# Patient Record
Sex: FEMALE | Race: WHITE | NOT HISPANIC OR LATINO | Employment: OTHER | URBAN - METROPOLITAN AREA
[De-identification: names, ages, dates, MRNs, and addresses within clinical notes are randomized per-mention and may not be internally consistent; named-entity substitution may affect disease eponyms.]

---

## 2017-01-02 ENCOUNTER — CARE COORDINATION (OUTPATIENT)
Dept: CARE COORDINATION | Facility: CLINIC | Age: 47
End: 2017-01-02

## 2017-01-02 NOTE — PROGRESS NOTES
[facility] Care Coordination Outreach    Patient was enrolled in [facility] upon discharge from hospital. Patient is enrolled in COPD Program.     Clinical Data:  Patient disenrolled due to inactivity.

## 2017-02-03 ENCOUNTER — TRANSFERRED RECORDS (OUTPATIENT)
Dept: HEALTH INFORMATION MANAGEMENT | Facility: CLINIC | Age: 47
End: 2017-02-03

## 2017-02-23 ENCOUNTER — TRANSFERRED RECORDS (OUTPATIENT)
Dept: HEALTH INFORMATION MANAGEMENT | Facility: CLINIC | Age: 47
End: 2017-02-23

## 2017-04-03 ENCOUNTER — TRANSFERRED RECORDS (OUTPATIENT)
Dept: HEALTH INFORMATION MANAGEMENT | Facility: CLINIC | Age: 47
End: 2017-04-03
Payer: MEDICARE

## 2017-04-17 ENCOUNTER — TRANSFERRED RECORDS (OUTPATIENT)
Dept: HEALTH INFORMATION MANAGEMENT | Facility: CLINIC | Age: 47
End: 2017-04-17
Payer: MEDICARE

## 2017-05-15 ENCOUNTER — TRANSFERRED RECORDS (OUTPATIENT)
Dept: HEALTH INFORMATION MANAGEMENT | Facility: CLINIC | Age: 47
End: 2017-05-15
Payer: MEDICARE

## 2017-06-05 ENCOUNTER — TRANSFERRED RECORDS (OUTPATIENT)
Dept: HEALTH INFORMATION MANAGEMENT | Facility: CLINIC | Age: 47
End: 2017-06-05

## 2017-10-03 ENCOUNTER — TRANSFERRED RECORDS (OUTPATIENT)
Dept: HEALTH INFORMATION MANAGEMENT | Facility: CLINIC | Age: 47
End: 2017-10-03
Payer: MEDICARE

## 2017-12-04 ENCOUNTER — TRANSFERRED RECORDS (OUTPATIENT)
Dept: HEALTH INFORMATION MANAGEMENT | Facility: CLINIC | Age: 47
End: 2017-12-04
Payer: MEDICARE

## 2018-02-06 ENCOUNTER — TRANSFERRED RECORDS (OUTPATIENT)
Dept: HEALTH INFORMATION MANAGEMENT | Facility: CLINIC | Age: 48
End: 2018-02-06

## 2018-02-28 ENCOUNTER — TRANSFERRED RECORDS (OUTPATIENT)
Dept: HEALTH INFORMATION MANAGEMENT | Facility: CLINIC | Age: 48
End: 2018-02-28

## 2018-04-04 ENCOUNTER — APPOINTMENT (OUTPATIENT)
Dept: CT IMAGING | Facility: CLINIC | Age: 48
DRG: 920 | End: 2018-04-04
Attending: INTERNAL MEDICINE
Payer: COMMERCIAL

## 2018-04-04 ENCOUNTER — HOSPITAL ENCOUNTER (INPATIENT)
Facility: CLINIC | Age: 48
LOS: 3 days | Discharge: HOME | DRG: 920 | End: 2018-04-08
Attending: INTERNAL MEDICINE | Admitting: INTERNAL MEDICINE
Payer: COMMERCIAL

## 2018-04-04 DIAGNOSIS — I10 ESSENTIAL HYPERTENSION: Primary | ICD-10-CM

## 2018-04-04 DIAGNOSIS — K59.03 DRUG-INDUCED CONSTIPATION: ICD-10-CM

## 2018-04-04 DIAGNOSIS — T81.82XA SUBCUTANEOUS EMPHYSEMA AFTER PROCEDURE: ICD-10-CM

## 2018-04-04 DIAGNOSIS — J44.1 COPD EXACERBATION (H): ICD-10-CM

## 2018-04-04 DIAGNOSIS — L03.211 FACIAL CELLULITIS: ICD-10-CM

## 2018-04-04 LAB
ALBUMIN SERPL-MCNC: 3.8 G/DL (ref 3.4–5)
ALP SERPL-CCNC: 100 U/L (ref 40–150)
ALT SERPL W P-5'-P-CCNC: 10 U/L (ref 0–50)
ANION GAP SERPL CALCULATED.3IONS-SCNC: 7 MMOL/L (ref 3–14)
AST SERPL W P-5'-P-CCNC: 13 U/L (ref 0–45)
BASOPHILS # BLD AUTO: 0 10E9/L (ref 0–0.2)
BASOPHILS NFR BLD AUTO: 0 %
BILIRUB SERPL-MCNC: 0.4 MG/DL (ref 0.2–1.3)
BUN SERPL-MCNC: 13 MG/DL (ref 7–30)
CALCIUM SERPL-MCNC: 8.9 MG/DL (ref 8.5–10.1)
CHLORIDE SERPL-SCNC: 102 MMOL/L (ref 94–109)
CO2 BLDCOV-SCNC: 27 MMOL/L (ref 21–28)
CO2 SERPL-SCNC: 29 MMOL/L (ref 20–32)
CREAT SERPL-MCNC: 0.73 MG/DL (ref 0.52–1.04)
CRP SERPL-MCNC: 22.6 MG/L (ref 0–8)
DIFFERENTIAL METHOD BLD: ABNORMAL
EOSINOPHIL # BLD AUTO: 0 10E9/L (ref 0–0.7)
EOSINOPHIL NFR BLD AUTO: 0 %
ERYTHROCYTE [DISTWIDTH] IN BLOOD BY AUTOMATED COUNT: 14.6 % (ref 10–15)
ERYTHROCYTE [SEDIMENTATION RATE] IN BLOOD BY WESTERGREN METHOD: 9 MM/H (ref 0–20)
GFR SERPL CREATININE-BSD FRML MDRD: 86 ML/MIN/1.7M2
GLUCOSE SERPL-MCNC: 123 MG/DL (ref 70–99)
HCT VFR BLD AUTO: 44.4 % (ref 35–47)
HGB BLD-MCNC: 14.9 G/DL (ref 11.7–15.7)
IMM GRANULOCYTES # BLD: 0.1 10E9/L (ref 0–0.4)
IMM GRANULOCYTES NFR BLD: 0.2 %
LACTATE BLD-SCNC: 2.6 MMOL/L (ref 0.7–2.1)
LYMPHOCYTES # BLD AUTO: 0.8 10E9/L (ref 0.8–5.3)
LYMPHOCYTES NFR BLD AUTO: 3.6 %
MCH RBC QN AUTO: 30.1 PG (ref 26.5–33)
MCHC RBC AUTO-ENTMCNC: 33.6 G/DL (ref 31.5–36.5)
MCV RBC AUTO: 90 FL (ref 78–100)
MONOCYTES # BLD AUTO: 0.3 10E9/L (ref 0–1.3)
MONOCYTES NFR BLD AUTO: 1.6 %
NEUTROPHILS # BLD AUTO: 19.5 10E9/L (ref 1.6–8.3)
NEUTROPHILS NFR BLD AUTO: 94.6 %
NRBC # BLD AUTO: 0 10*3/UL
NRBC BLD AUTO-RTO: 0 /100
PCO2 BLDV: 44 MM HG (ref 40–50)
PH BLDV: 7.4 PH (ref 7.32–7.43)
PLATELET # BLD AUTO: 279 10E9/L (ref 150–450)
PO2 BLDV: 40 MM HG (ref 25–47)
POTASSIUM SERPL-SCNC: 3.6 MMOL/L (ref 3.4–5.3)
PROT SERPL-MCNC: 7.5 G/DL (ref 6.8–8.8)
RBC # BLD AUTO: 4.95 10E12/L (ref 3.8–5.2)
SAO2 % BLDV FROM PO2: 75 %
SODIUM SERPL-SCNC: 138 MMOL/L (ref 133–144)
WBC # BLD AUTO: 20.6 10E9/L (ref 4–11)

## 2018-04-04 PROCEDURE — 25000128 H RX IP 250 OP 636: Performed by: INTERNAL MEDICINE

## 2018-04-04 PROCEDURE — 70491 CT SOFT TISSUE NECK W/DYE: CPT

## 2018-04-04 PROCEDURE — 86140 C-REACTIVE PROTEIN: CPT | Performed by: INTERNAL MEDICINE

## 2018-04-04 PROCEDURE — 70487 CT MAXILLOFACIAL W/DYE: CPT

## 2018-04-04 PROCEDURE — 82803 BLOOD GASES ANY COMBINATION: CPT

## 2018-04-04 PROCEDURE — 80053 COMPREHEN METABOLIC PANEL: CPT | Performed by: INTERNAL MEDICINE

## 2018-04-04 PROCEDURE — 99285 EMERGENCY DEPT VISIT HI MDM: CPT | Mod: 25 | Performed by: INTERNAL MEDICINE

## 2018-04-04 PROCEDURE — 25000125 ZZHC RX 250: Performed by: INTERNAL MEDICINE

## 2018-04-04 PROCEDURE — 99285 EMERGENCY DEPT VISIT HI MDM: CPT | Mod: Z6 | Performed by: INTERNAL MEDICINE

## 2018-04-04 PROCEDURE — 83605 ASSAY OF LACTIC ACID: CPT

## 2018-04-04 PROCEDURE — 85025 COMPLETE CBC W/AUTO DIFF WBC: CPT | Performed by: INTERNAL MEDICINE

## 2018-04-04 PROCEDURE — 96366 THER/PROPH/DIAG IV INF ADDON: CPT | Performed by: INTERNAL MEDICINE

## 2018-04-04 PROCEDURE — 96365 THER/PROPH/DIAG IV INF INIT: CPT | Performed by: INTERNAL MEDICINE

## 2018-04-04 PROCEDURE — 96375 TX/PRO/DX INJ NEW DRUG ADDON: CPT | Performed by: INTERNAL MEDICINE

## 2018-04-04 PROCEDURE — 85652 RBC SED RATE AUTOMATED: CPT | Performed by: INTERNAL MEDICINE

## 2018-04-04 RX ORDER — IOPAMIDOL 755 MG/ML
100 INJECTION, SOLUTION INTRAVASCULAR ONCE
Status: COMPLETED | OUTPATIENT
Start: 2018-04-04 | End: 2018-04-04

## 2018-04-04 RX ORDER — HYDROMORPHONE HYDROCHLORIDE 1 MG/ML
0.5 INJECTION, SOLUTION INTRAMUSCULAR; INTRAVENOUS; SUBCUTANEOUS ONCE
Status: COMPLETED | OUTPATIENT
Start: 2018-04-04 | End: 2018-04-04

## 2018-04-04 RX ORDER — NAPROXEN 500 MG/1
500 TABLET ORAL 2 TIMES DAILY PRN
Status: ON HOLD | COMMUNITY
End: 2019-08-22

## 2018-04-04 RX ORDER — AMPICILLIN AND SULBACTAM 2; 1 G/1; G/1
3 INJECTION, POWDER, FOR SOLUTION INTRAMUSCULAR; INTRAVENOUS ONCE
Status: COMPLETED | OUTPATIENT
Start: 2018-04-04 | End: 2018-04-05

## 2018-04-04 RX ADMIN — SODIUM CHLORIDE 50 ML: 9 INJECTION, SOLUTION INTRAVENOUS at 22:34

## 2018-04-04 RX ADMIN — IOPAMIDOL 100 ML: 755 INJECTION, SOLUTION INTRAVENOUS at 22:33

## 2018-04-04 RX ADMIN — AMPICILLIN SODIUM AND SULBACTAM SODIUM 3 G: 2; 1 INJECTION, POWDER, FOR SOLUTION INTRAMUSCULAR; INTRAVENOUS at 23:00

## 2018-04-04 RX ADMIN — HYDROMORPHONE HYDROCHLORIDE 0.5 MG: 1 INJECTION, SOLUTION INTRAMUSCULAR; INTRAVENOUS; SUBCUTANEOUS at 22:33

## 2018-04-04 ASSESSMENT — ENCOUNTER SYMPTOMS
FACIAL SWELLING: 1
SHORTNESS OF BREATH: 0

## 2018-04-04 NOTE — IP AVS SNAPSHOT
MRN:[id number]                      After Visit Summary   4/4/2018    Zayra Ramirez    MRN: [id number]           Thank you!     Thank you for choosing [facility] for your care. Our goal is always to provide you with excellent care. Hearing back from our patients is one way we can continue to improve our services. Please take a few minutes to complete the written survey that you may receive in the mail after you visit with us. Thank you!        Patient Information     Date Of Birth          1970        Designated Caregiver       Most Recent Value    Caregiver    Will someone help with your care after discharge? yes    Name of designated caregiver spouseTim    Phone number of caregiver [phone]    Caregiver address same address as pt's      About your hospital stay     You were admitted on:  April 5, 2018 You last received care in the:  [facility]    You were discharged on:  April 8, 2018        Reason for your hospital stay       Face cellulitis and emphysema                  Who to Call     For medical emergencies, please call 911.  For non-urgent questions about your medical care, please call your primary care provider or clinic, [phone]          Attending Provider     Provider Specialty    Abeba Ackerman MD Internal Medicine    [facility]    [facility]       Primary Care Provider Office Phone # Fax #    Preeti Marrufo PA-C [phone]      After Care Instructions     Activity       Your activity upon discharge: activity as tolerated            Diet       Follow this diet upon discharge: 2g salt                  Follow-up Appointments     [facility]ult [facility] Follow-up and recommended labs and tests       Follow up with primary care provider, Preeti Marrufo, within 7 days for hospital follow- up.  No follow up labs or test are needed.  Follow with ID in one month for face cellulitis       Appointments on [place]  "and/or [facility] (with [facility] or [facility] provider or service). Call [phone] if you haven't heard regarding these appointments within 7 days of discharge.                  Pending Results     No orders found from 2018 to 2018.            Statement of Approval     Ordered          18 1303  I have reviewed and agree with all the recommendations and orders detailed in this document.  EFFECTIVE NOW     Approved and electronically signed by:  Kobi Fisher MD             Admission Information     Date & Time Provider Department Dept. Phone    2018 Kobi Fisher MD [facility] [phone]      Your Vitals Were     Blood Pressure Pulse Temperature Respirations Weight Last Period    158/76 80 97.4  F (36.3  C) (Oral) 16 111.6 kg (246 lb) (LMP Unknown)    Pulse Oximetry BMI (Body Mass Index)                93% 42.23 kg/m2          MyChart Information     [employer] lets you send messages to your doctor, view your test results, renew your prescriptions, schedule appointments and more. To sign up, go to [email or web address] . Click on \"Log in\" on the left side of the screen, which will take you to the Welcome page. Then click on \"Sign up Now\" on the right side of the page.     You will be asked to enter the access code listed below, as well as some personal information. Please follow the directions to create your username and password.     Your access code is: [email or web address]  Expires: 2018  1:19 PM     Your access code will  in 90 days. If you need help or a new code, please call your [facility] clinic or [phone].        Care EveryWhere ID     This is your Care EveryWhere ID. This could be used by other organizations to access your [facility] medical records  [id number]        Equal Access to Services     BELTRAN MIRELES : Birgit House, waalicjada alberto, qaybta kaalmanicholas bridges. So [facility] [phone].    ATENCIÓN: Si breanne mahmood " diaz disposición servicios gratuitos de asistencia lingüística. Patria john [phone].    We comply with applicable federal civil rights laws and [place] laws. We do not discriminate on the basis of race, color, national origin, age, disability, sex, sexual orientation, or gender identity.               Review of your medicines      START taking        Dose / Directions    amLODIPine 10 MG tablet   Commonly known as:  NORVASC   Used for:  Essential hypertension        Dose:  10 mg   Start taking on:  4/9/2018   Take 1 tablet (10 mg) by mouth daily   Quantity:  30 tablet   Refills:  0       amoxicillin-clavulanate 875-125 MG per tablet   Commonly known as:  AUGMENTIN   Indication:  Infection of the Skin and/or Related Soft Tissue   Used for:  Subcutaneous emphysema after procedure, Facial cellulitis        Dose:  1 tablet   Take 1 tablet by mouth 2 times daily   Quantity:  10 tablet   Refills:  0       lisinopril-hydrochlorothiazide 20-25 MG per tablet   Commonly known as:  PRINZIDE/ZESTORETIC   Used for:  Essential hypertension   Replaces:  lisinopril-hydrochlorothiazide 20-12.5 MG per tablet        Dose:  1 tablet   Start taking on:  4/9/2018   Take 1 tablet by mouth daily   Quantity:  30 tablet   Refills:  0       senna-docusate 8.6-50 MG per tablet   Commonly known as:  SENOKOT-S;PERICOLACE   Used for:  Drug-induced constipation        Dose:  1-2 tablet   Take 1-2 tablets by mouth 2 times daily   Quantity:  100 tablet   Refills:  0         CONTINUE these medicines which may have CHANGED, or have new prescriptions. If we are uncertain of the size of tablets/capsules you have at home, strength may be listed as something that might have changed.        Dose / Directions    oxyCODONE IR 5 MG tablet   Commonly known as:  ROXICODONE   This may have changed:  when to take this   Used for:  Facial cellulitis        Dose:  5 mg   Take 1 tablet (5 mg) by mouth every 6 hours as needed for moderate to severe pain   Quantity:   20 tablet   Refills:  0         CONTINUE these medicines which have NOT CHANGED        Dose / Directions    ABILIFY PO        Dose:  10 mg   Take 10 mg by mouth daily.   Refills:  0       albuterol 108 (90 BASE) MCG/ACT Inhaler   Commonly known as:  PROAIR HFA/PROVENTIL HFA/VENTOLIN HFA        Dose:  2 puff   Inhale 2 puffs into the lungs every 6 hours as needed for shortness of breath / dyspnea or wheezing   Refills:  0       DULOXETINE HCL PO        Dose:  120 mg   Take 120 mg by mouth daily   Refills:  0       fluticasone-salmeterol 500-50 MCG/DOSE diskus inhaler   Commonly known as:  ADVAIR        Dose:  1 puff   Inhale 1 puff into the lungs every 12 hours   Refills:  0       LAMOTRIGINE PO        Dose:  300 mg   Take 300 mg by mouth daily   Refills:  0       NAPROXEN PO        Dose:  500 mg   Take 500 mg by mouth 2 times daily as needed for moderate pain   Refills:  0       ROPINIROLE HCL PO        Dose:  2 mg   Take 2 mg by mouth nightly as needed   Refills:  0       SINGULAIR 10 MG tablet   Generic drug:  montelukast        Dose:  10 mg   Take 10 mg by mouth At Bedtime   Refills:  0         STOP taking     ipratropium - albuterol 0.5 mg/2.5 mg/3 mL 0.5-2.5 (3) MG/3ML neb solution   Commonly known as:  DUONEB           lisinopril-hydrochlorothiazide 20-12.5 MG per tablet   Commonly known as:  PRINZIDE/ZESTORETIC   Replaced by:  lisinopril-hydrochlorothiazide 20-25 MG per tablet           METHYLPREDNISOLONE PO                Where to get your medicines      These medications were sent to [facility] [place], [place] - [address]  [address] [address]     Phone:  [phone]     amLODIPine 10 MG tablet    amoxicillin-clavulanate 875-125 MG per tablet    lisinopril-hydrochlorothiazide 20-25 MG per tablet    senna-docusate 8.6-50 MG per tablet         Some of these will need a paper prescription and others can be bought over the counter. Ask your nurse if you have  questions.     Bring a paper prescription for each of these medications     oxyCODONE IR 5 MG tablet                Protect others around you: Learn how to safely use, store and throw away your medicines at www.disposemymeds.org.        ANTIBIOTIC INSTRUCTION     You've Been Prescribed an Antibiotic - Now What?  Your healthcare team thinks that you or your loved one might have an infection. Some infections can be treated with antibiotics, which are powerful, life-saving drugs. Like all medications, antibiotics have side effects and should only be used when necessary. There are some important things you should know about your antibiotic treatment.      Your healthcare team may run tests before you start taking an antibiotic.    Your team may take samples (e.g., from your blood, urine or other areas) to run tests to look for bacteria. These test can be important to determine if you need an antibiotic at all and, if you do, which antibiotic will work best.      Within a few days, your healthcare team might change or even stop your antibiotic.    Your team may start you on an antibiotic while they are working to find out what is making you sick.    Your team might change your antibiotic because test results show that a different antibiotic would be better to treat your infection.    In some cases, once your team has more information, they learn that you do not need an antibiotic at all. They may find out that you don't have an infection, or that the antibiotic you're taking won't work against your infection. For example, an infection caused by a virus can't be treated with antibiotics. Staying on an antibiotic when you don't need it is more likely to be harmful than helpful.      You may experience side effects from your antibiotic.    Like all medications, antibiotics have side effects. Some of these can be serious.    Let you healthcare team know if you have any known allergies when you are admitted to the  hospital.    One significant side effect of nearly all antibiotics is the risk of severe and sometimes deadly diarrhea caused by Clostridium difficile (C. Difficile). This occurs when a person takes antibiotics because some good germs are destroyed. Antibiotic use allows C. diificile to take over, putting patients at high risk for this serious infection.    As a patient or caregiver, it is important to understand your or your loved one's antibiotic treatment. It is especially important for caregivers to speak up when patients can't speak for themselves. Here are some important questions to ask your healthcare team.    What infection is this antibiotic treating and how do you know I have that infection?    What side effects might occur from this antibiotic?    How long will I need to take this antibiotic?    Is it safe to take this antibiotic with other medications or supplements (e.g., vitamins) that I am taking?     Are there any special directions I need to know about taking this antibiotic? For example, should I take it with food?    How will I be monitored to know whether my infection is responding to the antibiotic?    What tests may help to make sure the right antibiotic is prescribed for me?      Information provided by:  www.cdc.gov/getsmart  U.S. Department of Health and Human Services  Centers for disease Control and Prevention  National Center for Emerging and Zoonotic Infectious Diseases  Division of Healthcare Quality Promotion        Information about OPIOIDS     PRESCRIPTION OPIOIDS: WHAT YOU NEED TO KNOW    Prescription opioids can be used to help relieve moderate to severe pain and are often prescribed following a surgery or injury, or for certain health conditions. These medications can be an important part of treatment but also come with serious risks. It is important to work with your health care provider to make sure you are getting the safest, most effective care.    WHAT ARE THE RISKS AND SIDE  EFFECTS OF OPIOID USE?  Prescription opioids carry serious risks of addiction and overdose, especially with prolonged use. An opioid overdose, often marked by slowed breathing can cause sudden death. The use of prescription opioids can have a number of side effects as well, even when taken as directed:      Tolerance - meaning you might need to take more of a medication for the same pain relief    Physical dependence - meaning you have symptoms of withdrawal when a medication is stopped    Increased sensitivity to pain    Constipation    Nausea, vomiting, and dry mouth    Sleepiness and dizziness    Confusion    Depression    Low levels of testosterone that can result in lower sex drive, energy, and strength    Itching and sweating    RISKS ARE GREATER WITH:    History of drug misuse, substance use disorder, or overdose    Mental health conditions (such as depression or anxiety)    Sleep apnea    Older age (65 years or older)    Pregnancy    Avoid alcohol while taking prescription opioids.   Also, unless specifically advised by your health care provider, medications to avoid include:    Benzodiazepines (such as Xanax or Valium)    Muscle relaxants (such as Soma or Flexeril)    Hypnotics (such as Ambien or Lunesta)    Other prescription opioids    KNOW YOUR OPTIONS:  Talk to your health care provider about ways to manage your pain that do not involve prescription opioids. Some of these options may actually work better and have fewer risks and side effects:    Pain relievers such as acetaminophen, ibuprofen, and naproxen    Some medications that are also used for depression or seizures    Physical therapy and exercise    Cognitive behavioral therapy, a psychological, goal-directed approach, in which patients learn how to modify physical, behavioral, and emotional triggers of pain and stress    IF YOU ARE PRESCRIBED OPIOIDS FOR PAIN:    Never take opioids in greater amounts or more often than prescribed    Follow up  with your primary health care provider and work together to create a plan on how to manage your pain.    Talk about ways to help manage your pain that do not involve prescription opioids    Talk about all concerns and side effects    Help prevent misuse and abuse    Never sell or share prescription opioids    Never use another person's prescription opioids    Store prescription opioids in a secure place and out of reach of others (this may include visitors, children, friends, and family)    Visit www.cdc.gov/drugoverdose to learn about risks of opioid abuse and overdose    If you believe you may be struggling with addiction, tell your health care provider and ask for guidance or call [facility]'s National Helpline at 4-448-317-HELP    LEARN MORE / www.cdc.gov/drugoverdose/prescribing/guideline.html    Safely dispose of unused prescription opioids: Find your local drug take-back programs and more information about the importance of safe disposal at www.doseofreality.[place].gov             Medication List: This is a list of all your medications and when to take them. Check marks below indicate your daily home schedule. Keep this list as a reference.      Medications           Morning Afternoon Evening Bedtime As Needed    ABILIFY PO   Take 10 mg by mouth daily.   Last time this was given:  10 mg on 4/8/2018  8:12 AM                                albuterol 108 (90 BASE) MCG/ACT Inhaler   Commonly known as:  PROAIR HFA/PROVENTIL HFA/VENTOLIN HFA   Inhale 2 puffs into the lungs every 6 hours as needed for shortness of breath / dyspnea or wheezing                                amLODIPine 10 MG tablet   Commonly known as:  NORVASC   Take 1 tablet (10 mg) by mouth daily   Start taking on:  4/9/2018   Last time this was given:  5 mg on 4/8/2018  8:11 AM                                amoxicillin-clavulanate 875-125 MG per tablet   Commonly known as:  AUGMENTIN   Take 1 tablet by mouth 2 times daily   Last time this was given:  1  tablet on 4/8/2018  8:11 AM                                DULOXETINE HCL PO   Take 120 mg by mouth daily   Last time this was given:  60 mg on 4/8/2018  8:11 AM                                fluticasone-salmeterol 500-50 MCG/DOSE diskus inhaler   Commonly known as:  ADVAIR   Inhale 1 puff into the lungs every 12 hours   Last time this was given:  1 puff on 4/8/2018  8:17 AM                                LAMOTRIGINE PO   Take 300 mg by mouth daily   Last time this was given:  150 mg on 4/8/2018  8:11 AM                                lisinopril-hydrochlorothiazide 20-25 MG per tablet   Commonly known as:  PRINZIDE/ZESTORETIC   Take 1 tablet by mouth daily   Start taking on:  4/9/2018   Last time this was given:  1 tablet on 4/8/2018  8:12 AM                                NAPROXEN PO   Take 500 mg by mouth 2 times daily as needed for moderate pain   Last time this was given:  500 mg on 4/8/2018 10:42 AM                                oxyCODONE IR 5 MG tablet   Commonly known as:  ROXICODONE   Take 1 tablet (5 mg) by mouth every 6 hours as needed for moderate to severe pain   Last time this was given:  10 mg on 4/8/2018 10:42 AM                                ROPINIROLE HCL PO   Take 2 mg by mouth nightly as needed   Last time this was given:  2 mg on 4/7/2018 10:46 PM                                senna-docusate 8.6-50 MG per tablet   Commonly known as:  SENOKOT-S;PERICOLACE   Take 1-2 tablets by mouth 2 times daily   Last time this was given:  2 tablets on 4/8/2018  8:12 AM                                SINGULAIR 10 MG tablet   Take 10 mg by mouth At Bedtime   Last time this was given:  10 mg on 4/7/2018 10:26 PM   Generic drug:  montelukast

## 2018-04-04 NOTE — IP AVS SNAPSHOT
[facility]    [address]    [facility] [address]    Phone:  [phone]                                       After Visit Summary   4/4/2018    Zayra Ramirez    MRN: [id number]           After Visit Summary Signature Page     I have received my discharge instructions, and my questions have been answered. I have discussed any challenges I see with this plan with the nurse or doctor.    ..........................................................................................................................................  Patient/Patient Representative Signature      ..........................................................................................................................................  Patient Representative Print Name and Relationship to Patient    ..................................................               ................................................  Date                                            Time    ..........................................................................................................................................  Reviewed by Signature/Title    ...................................................              ..............................................  Date                                                            Time

## 2018-04-05 PROBLEM — T79.7XXA SUBCUTANEOUS EMPHYSEMA (H): Status: ACTIVE | Noted: 2018-04-05

## 2018-04-05 LAB
BASOPHILS # BLD AUTO: 0 10E9/L (ref 0–0.2)
BASOPHILS NFR BLD AUTO: 0.1 %
CRP SERPL-MCNC: 16.2 MG/L (ref 0–8)
DIFFERENTIAL METHOD BLD: ABNORMAL
EOSINOPHIL # BLD AUTO: 0 10E9/L (ref 0–0.7)
EOSINOPHIL NFR BLD AUTO: 0 %
ERYTHROCYTE [DISTWIDTH] IN BLOOD BY AUTOMATED COUNT: 14.6 % (ref 10–15)
HCT VFR BLD AUTO: 43.8 % (ref 35–47)
HGB BLD-MCNC: 14.1 G/DL (ref 11.7–15.7)
IMM GRANULOCYTES # BLD: 0.1 10E9/L (ref 0–0.4)
IMM GRANULOCYTES NFR BLD: 0.3 %
LYMPHOCYTES # BLD AUTO: 1.5 10E9/L (ref 0.8–5.3)
LYMPHOCYTES NFR BLD AUTO: 8.2 %
MCH RBC QN AUTO: 28.9 PG (ref 26.5–33)
MCHC RBC AUTO-ENTMCNC: 32.2 G/DL (ref 31.5–36.5)
MCV RBC AUTO: 90 FL (ref 78–100)
MONOCYTES # BLD AUTO: 0.7 10E9/L (ref 0–1.3)
MONOCYTES NFR BLD AUTO: 3.7 %
NEUTROPHILS # BLD AUTO: 15.6 10E9/L (ref 1.6–8.3)
NEUTROPHILS NFR BLD AUTO: 87.7 %
NRBC # BLD AUTO: 0 10*3/UL
NRBC BLD AUTO-RTO: 0 /100
PLATELET # BLD AUTO: 294 10E9/L (ref 150–450)
RBC # BLD AUTO: 4.88 10E12/L (ref 3.8–5.2)
WBC # BLD AUTO: 17.8 10E9/L (ref 4–11)

## 2018-04-05 PROCEDURE — 25000128 H RX IP 250 OP 636: Performed by: INTERNAL MEDICINE

## 2018-04-05 PROCEDURE — 12000001 ZZH R&B MED SURG/OB UMMC

## 2018-04-05 PROCEDURE — 99207 ZZC CDG-CHARGE REQUIRED MANUAL ENTRY: CPT | Performed by: INTERNAL MEDICINE

## 2018-04-05 PROCEDURE — 36415 COLL VENOUS BLD VENIPUNCTURE: CPT | Performed by: INTERNAL MEDICINE

## 2018-04-05 PROCEDURE — 85025 COMPLETE CBC W/AUTO DIFF WBC: CPT | Performed by: INTERNAL MEDICINE

## 2018-04-05 PROCEDURE — 99207 ZZC APP CREDIT; MD BILLING SHARED VISIT: CPT | Performed by: INTERNAL MEDICINE

## 2018-04-05 PROCEDURE — 96376 TX/PRO/DX INJ SAME DRUG ADON: CPT | Performed by: INTERNAL MEDICINE

## 2018-04-05 PROCEDURE — 25000132 ZZH RX MED GY IP 250 OP 250 PS 637: Performed by: INTERNAL MEDICINE

## 2018-04-05 PROCEDURE — 99223 1ST HOSP IP/OBS HIGH 75: CPT | Mod: AI | Performed by: INTERNAL MEDICINE

## 2018-04-05 PROCEDURE — 86140 C-REACTIVE PROTEIN: CPT | Performed by: INTERNAL MEDICINE

## 2018-04-05 RX ORDER — ONDANSETRON 2 MG/ML
4 INJECTION INTRAMUSCULAR; INTRAVENOUS EVERY 6 HOURS PRN
Status: DISCONTINUED | OUTPATIENT
Start: 2018-04-05 | End: 2018-04-08 | Stop reason: HOSPADM

## 2018-04-05 RX ORDER — LAMOTRIGINE 150 MG/1
300 TABLET ORAL DAILY
Status: DISCONTINUED | OUTPATIENT
Start: 2018-04-05 | End: 2018-04-05

## 2018-04-05 RX ORDER — NAPROXEN 500 MG/1
500 TABLET ORAL 2 TIMES DAILY PRN
Status: DISCONTINUED | OUTPATIENT
Start: 2018-04-05 | End: 2018-04-08 | Stop reason: HOSPADM

## 2018-04-05 RX ORDER — OXYCODONE HYDROCHLORIDE 5 MG/1
5-10 TABLET ORAL 3 TIMES DAILY PRN
Status: DISCONTINUED | OUTPATIENT
Start: 2018-04-05 | End: 2018-04-07

## 2018-04-05 RX ORDER — LISINOPRIL AND HYDROCHLOROTHIAZIDE 12.5; 2 MG/1; MG/1
1 TABLET ORAL DAILY
Status: DISCONTINUED | OUTPATIENT
Start: 2018-04-05 | End: 2018-04-06

## 2018-04-05 RX ORDER — HYDRALAZINE HYDROCHLORIDE 20 MG/ML
10 INJECTION INTRAMUSCULAR; INTRAVENOUS EVERY 6 HOURS PRN
Status: DISCONTINUED | OUTPATIENT
Start: 2018-04-05 | End: 2018-04-08 | Stop reason: HOSPADM

## 2018-04-05 RX ORDER — CALCIUM CARBONATE 500 MG/1
1000 TABLET, CHEWABLE ORAL 4 TIMES DAILY PRN
Status: DISCONTINUED | OUTPATIENT
Start: 2018-04-05 | End: 2018-04-08 | Stop reason: HOSPADM

## 2018-04-05 RX ORDER — NALOXONE HYDROCHLORIDE 0.4 MG/ML
.1-.4 INJECTION, SOLUTION INTRAMUSCULAR; INTRAVENOUS; SUBCUTANEOUS
Status: DISCONTINUED | OUTPATIENT
Start: 2018-04-05 | End: 2018-04-08 | Stop reason: HOSPADM

## 2018-04-05 RX ORDER — MONTELUKAST SODIUM 10 MG/1
10 TABLET ORAL AT BEDTIME
Status: DISCONTINUED | OUTPATIENT
Start: 2018-04-05 | End: 2018-04-08 | Stop reason: HOSPADM

## 2018-04-05 RX ORDER — AMPICILLIN AND SULBACTAM 2; 1 G/1; G/1
3 INJECTION, POWDER, FOR SOLUTION INTRAMUSCULAR; INTRAVENOUS EVERY 6 HOURS
Status: DISCONTINUED | OUTPATIENT
Start: 2018-04-05 | End: 2018-04-07

## 2018-04-05 RX ORDER — ARIPIPRAZOLE 10 MG/1
10 TABLET ORAL DAILY
Status: DISCONTINUED | OUTPATIENT
Start: 2018-04-05 | End: 2018-04-08 | Stop reason: HOSPADM

## 2018-04-05 RX ORDER — LAMOTRIGINE 150 MG/1
150 TABLET ORAL 2 TIMES DAILY
Status: DISCONTINUED | OUTPATIENT
Start: 2018-04-05 | End: 2018-04-08 | Stop reason: HOSPADM

## 2018-04-05 RX ORDER — NALOXONE HYDROCHLORIDE 0.4 MG/ML
.1-.4 INJECTION, SOLUTION INTRAMUSCULAR; INTRAVENOUS; SUBCUTANEOUS
Status: DISCONTINUED | OUTPATIENT
Start: 2018-04-05 | End: 2018-04-05

## 2018-04-05 RX ORDER — ONDANSETRON 4 MG/1
4 TABLET, ORALLY DISINTEGRATING ORAL EVERY 6 HOURS PRN
Status: DISCONTINUED | OUTPATIENT
Start: 2018-04-05 | End: 2018-04-08 | Stop reason: HOSPADM

## 2018-04-05 RX ORDER — ALBUTEROL SULFATE 90 UG/1
2 AEROSOL, METERED RESPIRATORY (INHALATION) EVERY 6 HOURS PRN
Status: DISCONTINUED | OUTPATIENT
Start: 2018-04-05 | End: 2018-04-08 | Stop reason: HOSPADM

## 2018-04-05 RX ORDER — NICOTINE 21 MG/24HR
1 PATCH, TRANSDERMAL 24 HOURS TRANSDERMAL DAILY
Status: DISCONTINUED | OUTPATIENT
Start: 2018-04-05 | End: 2018-04-08 | Stop reason: HOSPADM

## 2018-04-05 RX ORDER — SODIUM CHLORIDE 9 MG/ML
INJECTION, SOLUTION INTRAVENOUS
Status: DISPENSED
Start: 2018-04-05 | End: 2018-04-05

## 2018-04-05 RX ORDER — DULOXETIN HYDROCHLORIDE 60 MG/1
120 CAPSULE, DELAYED RELEASE ORAL DAILY
Status: DISCONTINUED | OUTPATIENT
Start: 2018-04-05 | End: 2018-04-05

## 2018-04-05 RX ORDER — DULOXETIN HYDROCHLORIDE 60 MG/1
60 CAPSULE, DELAYED RELEASE ORAL 2 TIMES DAILY
Status: DISCONTINUED | OUTPATIENT
Start: 2018-04-05 | End: 2018-04-08 | Stop reason: HOSPADM

## 2018-04-05 RX ORDER — ACETAMINOPHEN 325 MG/1
650 TABLET ORAL EVERY 4 HOURS PRN
Status: DISCONTINUED | OUTPATIENT
Start: 2018-04-05 | End: 2018-04-08 | Stop reason: HOSPADM

## 2018-04-05 RX ORDER — HYDROMORPHONE HYDROCHLORIDE 1 MG/ML
0.5 INJECTION, SOLUTION INTRAMUSCULAR; INTRAVENOUS; SUBCUTANEOUS ONCE
Status: COMPLETED | OUTPATIENT
Start: 2018-04-05 | End: 2018-04-05

## 2018-04-05 RX ORDER — ROPINIROLE 2 MG/1
2 TABLET, FILM COATED ORAL
Status: DISCONTINUED | OUTPATIENT
Start: 2018-04-05 | End: 2018-04-08 | Stop reason: HOSPADM

## 2018-04-05 RX ADMIN — AMPICILLIN SODIUM AND SULBACTAM SODIUM 3 G: 2; 1 INJECTION, POWDER, FOR SOLUTION INTRAMUSCULAR; INTRAVENOUS at 11:42

## 2018-04-05 RX ADMIN — FLUTICASONE FUROATE AND VILANTEROL TRIFENATATE 1 PUFF: 200; 25 POWDER RESPIRATORY (INHALATION) at 08:20

## 2018-04-05 RX ADMIN — DULOXETINE HYDROCHLORIDE 60 MG: 60 CAPSULE, DELAYED RELEASE ORAL at 21:26

## 2018-04-05 RX ADMIN — OXYCODONE HYDROCHLORIDE 5 MG: 5 TABLET ORAL at 18:00

## 2018-04-05 RX ADMIN — NAPROXEN 500 MG: 500 TABLET ORAL at 14:31

## 2018-04-05 RX ADMIN — MONTELUKAST SODIUM 10 MG: 10 TABLET, COATED ORAL at 03:11

## 2018-04-05 RX ADMIN — OXYCODONE HYDROCHLORIDE 10 MG: 5 TABLET ORAL at 03:11

## 2018-04-05 RX ADMIN — HYDRALAZINE HYDROCHLORIDE 10 MG: 20 INJECTION INTRAMUSCULAR; INTRAVENOUS at 03:11

## 2018-04-05 RX ADMIN — AMPICILLIN SODIUM AND SULBACTAM SODIUM 3 G: 2; 1 INJECTION, POWDER, FOR SOLUTION INTRAMUSCULAR; INTRAVENOUS at 17:52

## 2018-04-05 RX ADMIN — HYDROMORPHONE HYDROCHLORIDE 0.5 MG: 1 INJECTION, SOLUTION INTRAMUSCULAR; INTRAVENOUS; SUBCUTANEOUS at 00:10

## 2018-04-05 RX ADMIN — LAMOTRIGINE 150 MG: 150 TABLET ORAL at 08:21

## 2018-04-05 RX ADMIN — ACETAMINOPHEN 650 MG: 325 TABLET ORAL at 14:31

## 2018-04-05 RX ADMIN — OXYCODONE HYDROCHLORIDE 10 MG: 5 TABLET ORAL at 14:31

## 2018-04-05 RX ADMIN — AMPICILLIN SODIUM AND SULBACTAM SODIUM 3 G: 2; 1 INJECTION, POWDER, FOR SOLUTION INTRAMUSCULAR; INTRAVENOUS at 05:55

## 2018-04-05 RX ADMIN — LISINOPRIL AND HYDROCHLOROTHIAZIDE 1 TABLET: 12.5; 2 TABLET ORAL at 08:23

## 2018-04-05 RX ADMIN — NICOTINE 1 PATCH: 21 PATCH, EXTENDED RELEASE TRANSDERMAL at 08:23

## 2018-04-05 RX ADMIN — DULOXETINE HYDROCHLORIDE 60 MG: 60 CAPSULE, DELAYED RELEASE ORAL at 08:21

## 2018-04-05 RX ADMIN — OXYCODONE HYDROCHLORIDE 5 MG: 5 TABLET ORAL at 21:26

## 2018-04-05 RX ADMIN — ACETAMINOPHEN 650 MG: 325 TABLET ORAL at 21:26

## 2018-04-05 RX ADMIN — LAMOTRIGINE 150 MG: 150 TABLET ORAL at 21:26

## 2018-04-05 RX ADMIN — ACETAMINOPHEN 650 MG: 325 TABLET ORAL at 08:22

## 2018-04-05 RX ADMIN — MONTELUKAST SODIUM 10 MG: 10 TABLET, COATED ORAL at 21:26

## 2018-04-05 RX ADMIN — ARIPIPRAZOLE 10 MG: 10 TABLET ORAL at 08:22

## 2018-04-05 RX ADMIN — NAPROXEN 500 MG: 500 TABLET ORAL at 08:22

## 2018-04-05 ASSESSMENT — ENCOUNTER SYMPTOMS
CHOKING: 0
FEVER: 0
CHEST TIGHTNESS: 0
STRIDOR: 0
WHEEZING: 0
APNEA: 0
COUGH: 0
ABDOMINAL PAIN: 0

## 2018-04-05 ASSESSMENT — ACTIVITIES OF DAILY LIVING (ADL)
BATHING: 0-->INDEPENDENT
RETIRED_COMMUNICATION: 0-->UNDERSTANDS/COMMUNICATES WITHOUT DIFFICULTY
TRANSFERRING: 0-->INDEPENDENT
DRESS: 0-->INDEPENDENT
TOILETING: 0-->INDEPENDENT
AMBULATION: 0-->INDEPENDENT
FALL_HISTORY_WITHIN_LAST_SIX_MONTHS: NO
RETIRED_EATING: 0-->INDEPENDENT
COGNITION: 0 - NO COGNITION ISSUES REPORTED
SWALLOWING: 0-->SWALLOWS FOODS/LIQUIDS WITHOUT DIFFICULTY

## 2018-04-05 ASSESSMENT — PAIN DESCRIPTION - DESCRIPTORS
DESCRIPTORS: ACHING
DESCRIPTORS: ACHING

## 2018-04-05 NOTE — PLAN OF CARE
Problem: Patient Care Overview  Goal: Plan of Care/Patient Progress Review  Pt A/O X 4. VSS except for high BP, hydralazine given. CMS and Neuro's are intact. Denies numbness and tingling in all extremities. Denies nausea, shortness of breath, and chest pain. Facial pain and oxycodone given. Pt has severe edema throughout face. Pt wearing 3L of O2 with nasal cannula overnight, instructed not to use CPAP at this time. Pt up independently. PIV is patent and SL. Clear liquid diet. Pt is able to make needs known and the call light is within the pt's reach. Continue to monitor.

## 2018-04-05 NOTE — ED PROVIDER NOTES
History     Chief Complaint   Patient presents with     Facial Swelling     Facial swelling afer oral biopsy today at 1100. No difficulty breathing.     HPI  Zayra Ramirez is a 48 year old female with a history of morbid obesity, COPD, and a dental abscess who presents to the ED today with facial swelling. Patient states they have experienced facial swelling before on the right side of her face 6 months ago and the symptoms have been treated in the past with antibiotics and steroids. Patient reports that the swelling of the left side of her face has never happened before. Patient complains of lesion on the right side of her mouth, stating that whenever the lesion is touched her face swells. Patient was seen on 08/10/2015 at [facility] for an infectious disease consult regarding swelling in her right submandibular area, with the discharge diagnosis; right submandibular cellulitis and source most likely being odontogenic. Patient followed-up for surgery at [facility]. Patients symptoms were previously treated with antibiotics and PICC line. Patient reports no shortness of breath or rashes.        I have reviewed the Medications, Allergies, Past Medical and Surgical History, and Social History in the Epic system.    Review of Systems   Constitutional: Negative for fever.   HENT: Positive for facial swelling.    Respiratory: Negative for apnea, cough, choking, chest tightness, shortness of breath, wheezing and stridor.    Cardiovascular: Negative for chest pain.   Gastrointestinal: Negative for abdominal pain.   Skin: Negative for rash.   All other systems reviewed and are negative.    PAST MEDICAL HISTORY:   Past Medical History:   Diagnosis Date     Asthma     copd     COPD (chronic obstructive pulmonary disease) (H)      Dental abscess 8-2015     Morbid obesity with BMI of 40.0-44.9, adult (H)        PAST SURGICAL HISTORY:   Past Surgical History:   Procedure Laterality Date     COLONOSCOPY        ENT SURGERY       HC DRAIN SKIN ABSCESS SIMPLE/SINGLE  3/16/2012    Procedure:INCISION AND DRAINAGE, ABSCESS, SIMPLE; Surgeon:CHRISTIANO HANCOCK; Location:[facility]     HYSTERECTOMY       INCISION AND DRAINAGE ABDOMEN WASHOUT, COMBINED         FAMILY HISTORY: No family history on file.    SOCIAL HISTORY:   Social History   Substance Use Topics     Smoking status: Current Every Day Smoker     Packs/day: 1.00     Types: Cigarettes     Smokeless tobacco: Never Used     Alcohol use No       Patient's Medications   New Prescriptions    No medications on file   Previous Medications    ALBUTEROL (PROAIR HFA, PROVENTIL HFA, VENTOLIN HFA) 108 (90 BASE) MCG/ACT INHALER    Inhale 2 puffs into the lungs every 6 hours as needed for shortness of breath / dyspnea or wheezing    ARIPIPRAZOLE (ABILIFY PO)    Take 10 mg by mouth daily.    DULOXETINE HCL PO    Take 120 mg by mouth daily    FLUTICASONE-SALMETEROL (ADVAIR) 500-50 MCG/DOSE DISKUS INHALER    Inhale 1 puff into the lungs every 12 hours    IPRATROPIUM - ALBUTEROL 0.5 MG/2.5 MG/3 ML (DUONEB) 0.5-2.5 (3) MG/3ML NEBULIZATION    Take 1 vial (3 mLs) by nebulization 4 times daily for 7 days Use on a scheduled basis for next week, afterwards use it every 6 hours as needed (Q6h PRN)    LAMOTRIGINE PO    Take 300 mg by mouth daily    LISINOPRIL-HYDROCHLOROTHIAZIDE (PRINZIDE,ZESTORETIC) 20-12.5 MG PER TABLET    Take 1 tablet by mouth daily    METHYLPREDNISOLONE PO    Take 24 mg by mouth Started today and tapering down.    MONTELUKAST (SINGULAIR) 10 MG TABLET    Take 10 mg by mouth At Bedtime    NAPROXEN PO    Take 500 mg by mouth 2 times daily as needed for moderate pain    OXYCODONE (ROXICODONE) 5 MG IMMEDIATE RELEASE TABLET    Take 1 tablet (5 mg) by mouth 3 times daily as needed for moderate to severe pain    ROPINIROLE HCL PO    Take 2 mg by mouth nightly as needed    Modified Medications    No medications on file   Discontinued Medications    GUAIFENESIN-DEXTROMETHORPHAN  (ROBITUSSIN DM) 100-10 MG/5ML SYRUP    Take 10 mLs by mouth every 4 hours as needed for cough    PREDNISONE (DELTASONE) 20 MG TABLET    Take 3 tabs (60 mg) orally daily for 3 days, 2 tabs (40 mg) orally daily for 3 days, 1 tab (20 mg) orally daily for 3 days, then 1/2 tab (10 mg) orally for 3 days          Allergies   Allergen Reactions     Bees      Doxycycline Anaphylaxis     Erythromycin Anaphylaxis       Physical Exam   BP: (!) 145/104  Pulse: 104  Heart Rate: 90  Temp: 99  F (37.2  C)  Resp: 16  Weight: 111.6 kg (246 lb)  SpO2: 91 %      Physical Exam   Constitutional: She is oriented to person, place, and time. No distress.   HENT:   Head: Atraumatic.       Mouth/Throat: Oropharynx is clear and moist. No oropharyngeal exudate.   Eyes: Pupils are equal, round, and reactive to light. No scleral icterus.   Neck: Neck supple. No JVD present.   Cardiovascular: Regular rhythm, normal heart sounds and intact distal pulses.  Tachycardia present.  Exam reveals no gallop.    No murmur heard.  Pulmonary/Chest: Breath sounds normal. No stridor. No respiratory distress. She has no wheezes. She has no rales. She exhibits no tenderness.   Abdominal: Soft. Bowel sounds are normal. She exhibits no distension and no mass. There is no tenderness. There is no rebound and no guarding.   Musculoskeletal: She exhibits no edema or tenderness.   Neurological: She is alert and oriented to person, place, and time. No cranial nerve deficit. Coordination normal.   Skin: Skin is warm. No rash noted. She is not diaphoretic.       ED Course     ED Course     Procedures        Results for orders placed or performed during the hospital encounter of 04/04/18 (from the past 24 hour(s))   CBC with platelets differential     Status: Abnormal    Collection Time: 04/04/18  9:47 PM   Result Value Ref Range    WBC 20.6 (H) 4.0 - 11.0 10e9/L    RBC Count 4.95 3.8 - 5.2 10e12/L    Hemoglobin 14.9 11.7 - 15.7 g/dL    Hematocrit 44.4 35.0 - 47.0 %    MCV  90 78 - 100 fl    MCH 30.1 26.5 - 33.0 pg    MCHC 33.6 31.5 - 36.5 g/dL    RDW 14.6 10.0 - 15.0 %    Platelet Count 279 150 - 450 10e9/L    Diff Method Automated Method     % Neutrophils 94.6 %    % Lymphocytes 3.6 %    % Monocytes 1.6 %    % Eosinophils 0.0 %    % Basophils 0.0 %    % Immature Granulocytes 0.2 %    Nucleated RBCs 0 0 /100    Absolute Neutrophil 19.5 (H) 1.6 - 8.3 10e9/L    Absolute Lymphocytes 0.8 0.8 - 5.3 10e9/L    Absolute Monocytes 0.3 0.0 - 1.3 10e9/L    Absolute Eosinophils 0.0 0.0 - 0.7 10e9/L    Absolute Basophils 0.0 0.0 - 0.2 10e9/L    Abs Immature Granulocytes 0.1 0 - 0.4 10e9/L    Absolute Nucleated RBC 0.0    Comprehensive metabolic panel     Status: Abnormal    Collection Time: 04/04/18  9:47 PM   Result Value Ref Range    Sodium 138 133 - 144 mmol/L    Potassium 3.6 3.4 - 5.3 mmol/L    Chloride 102 94 - 109 mmol/L    Carbon Dioxide 29 20 - 32 mmol/L    Anion Gap 7 3 - 14 mmol/L    Glucose 123 (H) 70 - 99 mg/dL    Urea Nitrogen 13 7 - 30 mg/dL    Creatinine 0.73 0.52 - 1.04 mg/dL    GFR Estimate 86 >60 mL/min/1.7m2    GFR Estimate If Black >90 >60 mL/min/1.7m2    Calcium 8.9 8.5 - 10.1 mg/dL    Bilirubin Total 0.4 0.2 - 1.3 mg/dL    Albumin 3.8 3.4 - 5.0 g/dL    Protein Total 7.5 6.8 - 8.8 g/dL    Alkaline Phosphatase 100 40 - 150 U/L    ALT 10 0 - 50 U/L    AST 13 0 - 45 U/L   CRP inflammation     Status: Abnormal    Collection Time: 04/04/18  9:47 PM   Result Value Ref Range    CRP Inflammation 22.6 (H) 0.0 - 8.0 mg/L   Erythrocyte sedimentation rate auto     Status: None    Collection Time: 04/04/18  9:47 PM   Result Value Ref Range    Sed Rate 9 0 - 20 mm/h   Soft tissue neck CT w contrast     Status: None    Collection Time: 04/04/18 10:33 PM    Narrative    CT SCAN OF THE NECK AND FACE WITH CONTRAST  4/4/2018 10:33 PM     HISTORY: Right-sided swelling. Possible odontogenic origin.    TECHNIQUE:  Axial images and coronal reformations. Radiation dose for  this scan was reduced  using automated exposure control, adjustment of  the mA and/or kV according to patient size, or iterative  reconstruction technique. Isovue 370, 100 mL IV.    COMPARISON: 8/7/2015    FINDINGS: There is extensive subcutaneous emphysema bilaterally most  marked in the right cheek and preorbital region but fairly extensive  bilaterally. It extends caudally to the level of the hyoid bone on the  right and slightly more inferiorly on the left. It extends superiorly  into the scalp bilaterally more on the right.    Visualized sinuses, nasopharynx and orbits: Normal.      Tongue, oral cavity and oropharynx:  Normal.      Hypopharynx: Normal.      Larynx and trachea: Normal.      Thyroid: Normal.    Submandibular glands: Normal.      Parotid glands: Normal.        Lymph nodes: Normal.      Vasculature: Normal.      Upper mediastinum and lungs: Normal.      Bones: There is lucency in the left upper mandible where tooth 14 has  been removed. There likely was periodontal disease in this region. No  osteomyelitis identified in the right maxilla or within the mandible.  There was a small focus of osteomyelitis in the right mandible on  prior study of 8/7/15 which has normalized.      Impression    IMPRESSION:     1. Extensive subcutaneous emphysema in both sides the face right  greater than left as described. The source of the subcutaneous  emphysema was not determined.    2. Periodontal disease left maxillary molar region where tooth has  been removed. No soft tissue abscess adjacent to this area of  maxillary periodontal disease.  3. No right-sided osteomyelitis identified.   4. Right mandibular focus of osteomyelitis on prior study of 8/7/:15  has normalized in the interval.    DARIO MEDINA MD   CT Maxillofacial w Contrast     Status: None    Collection Time: 04/04/18 10:39 PM    Narrative    CT SCAN OF THE NECK AND FACE WITH CONTRAST  4/4/2018 10:33 PM     HISTORY: Right-sided swelling. Possible odontogenic  origin.    TECHNIQUE:  Axial images and coronal reformations. Radiation dose for  this scan was reduced using automated exposure control, adjustment of  the mA and/or kV according to patient size, or iterative  reconstruction technique. Isovue 370, 100 mL IV.    COMPARISON: 8/7/2015    FINDINGS: There is extensive subcutaneous emphysema bilaterally most  marked in the right cheek and preorbital region but fairly extensive  bilaterally. It extends caudally to the level of the hyoid bone on the  right and slightly more inferiorly on the left. It extends superiorly  into the scalp bilaterally more on the right.    Visualized sinuses, nasopharynx and orbits: Normal.      Tongue, oral cavity and oropharynx:  Normal.      Hypopharynx: Normal.      Larynx and trachea: Normal.      Thyroid: Normal.    Submandibular glands: Normal.      Parotid glands: Normal.        Lymph nodes: Normal.      Vasculature: Normal.      Upper mediastinum and lungs: Normal.      Bones: There is lucency in the left upper mandible where tooth 14 has  been removed. There likely was periodontal disease in this region. No  osteomyelitis identified in the right maxilla or within the mandible.  There was a small focus of osteomyelitis in the right mandible on  prior study of 8/7/15 which has normalized.      Impression    IMPRESSION:     1. Extensive subcutaneous emphysema in both sides the face right  greater than left as described. The source of the subcutaneous  emphysema was not determined.    2. Periodontal disease left maxillary molar region where tooth has  been removed. No soft tissue abscess adjacent to this area of  maxillary periodontal disease.  3. No right-sided osteomyelitis identified.   4. Right mandibular focus of osteomyelitis on prior study of 8/7/:15  has normalized in the interval.    DARIO MEDINA MD   ISTAT gases lactate yulia POCT     Status: Abnormal    Collection Time: 04/04/18 10:41 PM   Result Value Ref Range    Ph Venous  7.40 7.32 - 7.43 pH    PCO2 Venous 44 40 - 50 mm Hg    PO2 Venous 40 25 - 47 mm Hg    Bicarbonate Venous 27 21 - 28 mmol/L    O2 Sat Venous 75 %    Lactic Acid 2.6 (H) 0.7 - 2.1 mmol/L      Lactate is greater than 1.9 due to dehydration and early sepsis, at this time there is no sign of severe sepsis or septic shock.       Labs Ordered and Resulted from Time of ED Arrival Up to the Time of Departure from the ED   CBC WITH PLATELETS DIFFERENTIAL - Abnormal; Notable for the following:        Result Value    WBC 20.6 (*)     Absolute Neutrophil 19.5 (*)     All other components within normal limits   COMPREHENSIVE METABOLIC PANEL - Abnormal; Notable for the following:     Glucose 123 (*)     All other components within normal limits   CRP INFLAMMATION - Abnormal; Notable for the following:     CRP Inflammation 22.6 (*)     All other components within normal limits   ISTAT  GASES LACTATE WILLIAM POCT - Abnormal; Notable for the following:     Lactic Acid 2.6 (*)     All other components within normal limits   ERYTHROCYTE SEDIMENTATION RATE AUTO   CARDIAC CONTINUOUS MONITORING   PULSE OXIMETRY NURSING   ISTAT CG4 GASES LACTATE WILLIAM NURSING POCT            Assessments & Plan (with Medical Decision Making)  Facial cellulitis and extensive subcutaneous emphysema after right lower dental procedure-apparently just bx without using drill per pt, also no use of CPAP after the procedure, started unasyn 3 gram iv and NS, dilaudid prn for pain, HR down to 80's now, D/W Oral Surgery on call, admit to Hospitalist for the night, continue abx possibly decadron (apparently this pt was [facility] decadron and [facility] with the procedure), they will do full consult in am.       I have reviewed the nursing notes.    I have reviewed the findings, diagnosis, plan and need for follow up with the patient.    New Prescriptions    No medications on file       Final diagnoses:   Subcutaneous emphysema after procedure   Facial cellulitis    I, Lindsey Hoffman, am serving as a trained medical scribe to document services personally performed by Abeba Ackerman MD, based on the provider's statements to me.      I, Abeba Ackerman MD, was physically present and have reviewed and verified the accuracy of this note documented by Lindsey Hoffman.       4/4/2018   [facility], [place], EMERGENCY DEPARTMENT       Abeba Ackerman MD  04/05/18 0016

## 2018-04-05 NOTE — ED NOTES
[facility], [place]   ED Nurse to Floor Handoff     Zayra Ramirez is a 48 year old female who speaks English and lives with a spouse,  in a home  They arrived in the ED by car from home    ED Chief Complaint: Facial Swelling (Facial swelling afer oral biopsy today at 1100. No difficulty breathing.)    ED Dx;   Final diagnoses:   Subcutaneous emphysema after procedure   Facial cellulitis         Needed?: No    Allergies:   Allergies   Allergen Reactions     Bees      Doxycycline Anaphylaxis     Erythromycin Anaphylaxis   .  Past Medical Hx:   Past Medical History:   Diagnosis Date     Asthma     copd     COPD (chronic obstructive pulmonary disease) (H)      Dental abscess 8-2015     Morbid obesity with BMI of 40.0-44.9, adult (H)       Baseline Mental status: WDL  Current Mental Status changes: {Current Mental Status Changes:530777    Infection present or suspected this encounter: no  Sepsis suspected: No  Isolation type: No active isolations     Activity level - Baseline/Home:  Independent  Activity Level - Current:   Independent    Bariatric equipment needed?: No    In the ED these meds were given:   Medications   ampicillin-sulbactam (UNASYN) 3 g vial to attach to  mL bag (0 g Intravenous Stopped 4/5/18 0047)   HYDROmorphone (PF) (DILAUDID) injection 0.5 mg (0.5 mg Intravenous Given 4/4/18 2233)   iopamidol (ISOVUE-370) solution 100 mL (100 mLs Intravenous Given 4/4/18 2233)   sodium chloride 0.9 % bag 500mL for CT scan flush use (50 mLs As instructed Given 4/4/18 2234)   HYDROmorphone (PF) (DILAUDID) injection 0.5 mg (0.5 mg Intravenous Given 4/5/18 0010)       Drips running?  Yes    Home pump  Yes    Current LDAs  Peripheral IV 04/04/18 Left (Active)   Number of days:1       Wound 06/08/16 Left Chest Ulceration (Active)   Number of days:666       Wound 06/08/16 Mid Coccyx (Active)   Number of days:666       Wound 06/08/16 Left Heel Abrasion(s) (Active)   Number of  days:666       Labs results:   Labs Ordered and Resulted from Time of ED Arrival Up to the Time of Departure from the ED   CBC WITH PLATELETS DIFFERENTIAL - Abnormal; Notable for the following:        Result Value    WBC 20.6 (*)     Absolute Neutrophil 19.5 (*)     All other components within normal limits   COMPREHENSIVE METABOLIC PANEL - Abnormal; Notable for the following:     Glucose 123 (*)     All other components within normal limits   CRP INFLAMMATION - Abnormal; Notable for the following:     CRP Inflammation 22.6 (*)     All other components within normal limits   ISTAT  GASES LACTATE WILLIAM POCT - Abnormal; Notable for the following:     Lactic Acid 2.6 (*)     All other components within normal limits   ERYTHROCYTE SEDIMENTATION RATE AUTO   CARDIAC CONTINUOUS MONITORING   PULSE OXIMETRY NURSING   ISTAT CG4 GASES LACTATE WILLIAM NURSING POCT       Imaging Studies:   Recent Results (from the past 24 hour(s))   Soft tissue neck CT w contrast    Narrative    CT SCAN OF THE NECK AND FACE WITH CONTRAST  4/4/2018 10:33 PM     HISTORY: Right-sided swelling. Possible odontogenic origin.    TECHNIQUE:  Axial images and coronal reformations. Radiation dose for  this scan was reduced using automated exposure control, adjustment of  the mA and/or kV according to patient size, or iterative  reconstruction technique. Isovue 370, 100 mL IV.    COMPARISON: 8/7/2015    FINDINGS: There is extensive subcutaneous emphysema bilaterally most  marked in the right cheek and preorbital region but fairly extensive  bilaterally. It extends caudally to the level of the hyoid bone on the  right and slightly more inferiorly on the left. It extends superiorly  into the scalp bilaterally more on the right.    Visualized sinuses, nasopharynx and orbits: Normal.      Tongue, oral cavity and oropharynx:  Normal.      Hypopharynx: Normal.      Larynx and trachea: Normal.      Thyroid: Normal.    Submandibular glands: Normal.      Parotid glands:  Normal.        Lymph nodes: Normal.      Vasculature: Normal.      Upper mediastinum and lungs: Normal.      Bones: There is lucency in the left upper mandible where tooth 14 has  been removed. There likely was periodontal disease in this region. No  osteomyelitis identified in the right maxilla or within the mandible.  There was a small focus of osteomyelitis in the right mandible on  prior study of 8/7/15 which has normalized.      Impression    IMPRESSION:     1. Extensive subcutaneous emphysema in both sides the face right  greater than left as described. The source of the subcutaneous  emphysema was not determined.    2. Periodontal disease left maxillary molar region where tooth has  been removed. No soft tissue abscess adjacent to this area of  maxillary periodontal disease.  3. No right-sided osteomyelitis identified.   4. Right mandibular focus of osteomyelitis on prior study of 8/7/:15  has normalized in the interval.    DARIO MEDINA MD   CT Maxillofacial w Contrast    Narrative    CT SCAN OF THE NECK AND FACE WITH CONTRAST  4/4/2018 10:33 PM     HISTORY: Right-sided swelling. Possible odontogenic origin.    TECHNIQUE:  Axial images and coronal reformations. Radiation dose for  this scan was reduced using automated exposure control, adjustment of  the mA and/or kV according to patient size, or iterative  reconstruction technique. Isovue 370, 100 mL IV.    COMPARISON: 8/7/2015    FINDINGS: There is extensive subcutaneous emphysema bilaterally most  marked in the right cheek and preorbital region but fairly extensive  bilaterally. It extends caudally to the level of the hyoid bone on the  right and slightly more inferiorly on the left. It extends superiorly  into the scalp bilaterally more on the right.    Visualized sinuses, nasopharynx and orbits: Normal.      Tongue, oral cavity and oropharynx:  Normal.      Hypopharynx: Normal.      Larynx and trachea: Normal.      Thyroid: Normal.    Submandibular  glands: Normal.      Parotid glands: Normal.        Lymph nodes: Normal.      Vasculature: Normal.      Upper mediastinum and lungs: Normal.      Bones: There is lucency in the left upper mandible where tooth 14 has  been removed. There likely was periodontal disease in this region. No  osteomyelitis identified in the right maxilla or within the mandible.  There was a small focus of osteomyelitis in the right mandible on  prior study of 8/7/15 which has normalized.      Impression    IMPRESSION:     1. Extensive subcutaneous emphysema in both sides the face right  greater than left as described. The source of the subcutaneous  emphysema was not determined.    2. Periodontal disease left maxillary molar region where tooth has  been removed. No soft tissue abscess adjacent to this area of  maxillary periodontal disease.  3. No right-sided osteomyelitis identified.   4. Right mandibular focus of osteomyelitis on prior study of 8/7/:15  has normalized in the interval.    DARIO MEDINA MD       Recent vital signs:   /75  Pulse 104  Temp 99  F (37.2  C) (Oral)  Resp 19  Wt 111.6 kg (246 lb)  LMP  (LMP Unknown)  SpO2 92%  Breastfeeding? No  BMI 42.23 kg/m2    Cardiac Rhythm: Normal Sinus  Pt needs tele? Yes  Skin/wound Issues: None    Code Status: Full Code    Pain control: good    Nausea control: good    Abnormal labs/tests/findings requiring intervention: none    Family present during ED course? No   Family Comments/Social Situation comments: none    Tasks needing completion: None    Denise Mckeon RN  [facility]-- [phone] [facility]  [phone] [facility]

## 2018-04-05 NOTE — ED NOTES
02 sats 87 to 91 % prior to receiving dilaudid. Pt states that she has sleep apnea and uses 3L of 02 at night. The 02 has been applied.

## 2018-04-05 NOTE — CONSULTS
Oral & Maxillofacial Surgery Consultation, PGY-4      Patient: Zayra Ramirez    Date of service: 2018  : 1970    Attending physician: [id number] [Kobi Fisher MD]  MRN: [id number]    PCP: Preeti Marrufo            History of Present Illness:   Zayra Ramirez is a 48 year old female who presents w/ bilateral facial swelling with air emphysema on CT imaging.  The patient has a history of tooth extraction in  from the maxillary right region.  Since then, the patient has had a non-healing ulcerative lesion on the right buccal mucosa.  This lesion has been biopsied three times.  Results of the first two biopsies were:  1) Chronic inflammatory ulceration with fibrosis, buccal mucosa (3/11/2017) and 2)  Ulcer, subacute ulcer with eosinophilia and hyperkeratosis (See comment), right buccal mucosa (2018). After the second biopsy, the patient developed severe right sided facial swelling that went from her supraorbital region to the buccal aspect.  The patient was seen for close follow-up and this swelling resolved without complication.  The lesion, however, did not heal.  A biopsy was performed yesterday in the Oral & Maxillofacial Surgery Clinic at [facility] using local anesthesia.  The patient's appointment was late morning.  She said that about 5-10 minutes after getting home, her face began to swell.  She called the FS Department and a Sonendo Dose Demarcus was called in for her.  She denies using her CPAP prior to this swelling presenting.  Last night, the patient went to the [facility] ER due to bilateral facial swelling.  CT imaging was performed, revealing bilateral subcutaneous air emphysema.  The cause of this is unknown, as there were no air driven instruments used.  This was a surgical procedure performed with a #15 blade.      The patient denies this type of swelling anywhere else on her body when trauma occurs.  She said that she had never had this happen to her previously, until her last biopsy in  February 2018.      Medications/Sutures Given:  900 mg Clindamycin IV pre-operatively  10 mg Decadron IV pre-operatively  64 mg 2% Lidocaine with 1:100,000 epinephrine via local infiltration  3-0 Vicryl Suture  3-0 Chromic Gut Suture    Discharge Medications:  Clindamycin  Norco 5-325 mg  Medrol Dose Demarcus          Past Medical History:     Past Medical History:   Diagnosis Date     Asthma     copd     COPD (chronic obstructive pulmonary disease) (H)      Dental abscess 8-2015     Morbid obesity with BMI of 40.0-44.9, adult (H)        Past Surgical History:   Procedure Laterality Date     COLONOSCOPY       ENT SURGERY       HC DRAIN SKIN ABSCESS SIMPLE/SINGLE  3/16/2012    Procedure:INCISION AND DRAINAGE, ABSCESS, SIMPLE; Surgeon:CHRISTIANO HANCOCK; Location:[facility]     HYSTERECTOMY       INCISION AND DRAINAGE ABDOMEN WASHOUT, COMBINED              Allergies:     Allergies   Allergen Reactions     Bees      Doxycycline Anaphylaxis     Erythromycin Anaphylaxis             Medications:       Current Facility-Administered Medications on File Prior to Encounter:  Lidocaine 1 % injection 0.5-5 mL   sodium chloride (PF) 0.9% PF flush 5-50 mL     Current Outpatient Prescriptions on File Prior to Encounter:  oxyCODONE (ROXICODONE) 5 MG immediate release tablet Take 1 tablet (5 mg) by mouth 3 times daily as needed for moderate to severe pain   DULOXETINE HCL PO Take 120 mg by mouth daily   LAMOTRIGINE PO Take 300 mg by mouth daily   fluticasone-salmeterol (ADVAIR) 500-50 MCG/DOSE diskus inhaler Inhale 1 puff into the lungs every 12 hours   montelukast (SINGULAIR) 10 MG tablet Take 10 mg by mouth At Bedtime   albuterol (PROAIR HFA, PROVENTIL HFA, VENTOLIN HFA) 108 (90 BASE) MCG/ACT inhaler Inhale 2 puffs into the lungs every 6 hours as needed for shortness of breath / dyspnea or wheezing   lisinopril-hydrochlorothiazide (PRINZIDE,ZESTORETIC) 20-12.5 MG per tablet Take 1 tablet by mouth daily   ARIPiprazole (ABILIFY PO) Take  10 mg by mouth daily.   ROPINIROLE HCL PO Take 2 mg by mouth nightly as needed    ipratropium - albuterol 0.5 mg/2.5 mg/3 mL (DUONEB) 0.5-2.5 (3) MG/3ML nebulization Take 1 vial (3 mLs) by nebulization 4 times daily for 7 days Use on a scheduled basis for next week, afterwards use it every 6 hours as needed (Q6h PRN)             Social History:     Social History     Social History     Marital status: Single     Spouse name: N/A     Number of children: N/A     Years of education: N/A     Social History Main Topics     Smoking status: Current Every Day Smoker     Packs/day: 1.00     Types: Cigarettes     Smokeless tobacco: Never Used     Alcohol use No     Drug use: No     Sexual activity: Not Asked     Other Topics Concern     None     Social History Narrative             Physical Exam:   /88  Pulse 104  Temp 96.7  F (35.9  C) (Oral)  Resp 16  Wt 111.6 kg (246 lb)  LMP  (LMP Unknown)  SpO2 93%  Breastfeeding? No  BMI 42.23 kg/m2  Temp (24hrs), Av.9  F (36.6  C), Min:96.7  F (35.9  C), Max:99  F (37.2  C)    General: Patient laying in bed, alert, NAD  HEENT: sclerae anicteric, PERRL, EOMI.  There is obvious facial swelling bilaterally, in the periorbital region.  This extends from supraorbital region to the malar regions bilaterally.  This swelling is soft, mildly tender.  No crepitus noted upon palpation.  Overlying skin in the right malar region is erythematous, warm.  Intraoral examination reveals surgical site on the right buccal mucosa that is intact with sutures.  No purulence is noted.            Data:   Labs:  CRP:  22.6  ESR:  9  Lactic Acid:  2.6    Lab Results   Component Value Date    WBC 17.8 2018     Lab Results   Component Value Date    RBC 4.88 2018     Lab Results   Component Value Date    HGB 14.1 2018     Lab Results   Component Value Date    HCT 43.8 2018     No components found for: MCT  Lab Results   Component Value Date    MCV 90 2018     Lab  Results   Component Value Date    MCH 28.9 04/05/2018     Lab Results   Component Value Date    MCHC 32.2 04/05/2018     Lab Results   Component Value Date    RDW 14.6 04/05/2018     Lab Results   Component Value Date     04/05/2018     Last Basic Metabolic Panel:  Lab Results   Component Value Date     04/04/2018      Lab Results   Component Value Date    POTASSIUM 3.6 04/04/2018     Lab Results   Component Value Date    CHLORIDE 102 04/04/2018     Lab Results   Component Value Date    NATALIYA 8.9 04/04/2018     Lab Results   Component Value Date    CO2 29 04/04/2018     Lab Results   Component Value Date    BUN 13 04/04/2018     Lab Results   Component Value Date    CR 0.73 04/04/2018     Lab Results   Component Value Date     04/04/2018         Imaging:  CT Maxillofacial 4/4/2018:  IMPRESSION:     1. Extensive subcutaneous emphysema in both sides the face right  greater than left as described. The source of the subcutaneous  emphysema was not determined.    2. Periodontal disease left maxillary molar region where tooth has  been removed. No soft tissue abscess adjacent to this area of  maxillary periodontal disease.  3. No right-sided osteomyelitis identified.   4. Right mandibular focus of osteomyelitis on prior study of 8/7/:15  has normalized in the interval.          Assessment/Plan:   Zayra Raimrez is a 48 year old female with PMHx most significant for ROBERT, Emphysema, Anemia, Interstitial Pulmonary Bronchitis, GERD, Depression and Anxiety.  The patient with a non-healing ulcer on the right buccal mucosa of over a one year duration.  Multiple biopsies of this lesion have been without any signs of dysplasia and/or malignancy.  New onset facial swelling after biopsy procedures that began in February 2018 and again on 4/4/2018.  There is no clear cause for the air emphysema noted on CT imaging, as there were no air driven instruments used during the procedure and she claims that she did not use  her CPAP after the procedure yesterday.  Of course, based on the CT, air emphysema caused by her CPAP is the most likely.  This could be an immune reaction, however, it is rather localized for this.      1. No CPAP if possible until intraoral incision site has healed.  2. Continue work-up/cares per primary team.  3. Consider immune/allergy evaluation.             2/20/2018 4/5/2018    Uche Salcedo DDS  Oral & Maxillofacial Surgery, PGY-4  [phone]

## 2018-04-05 NOTE — H&P
History and Physical     Zayra Ramirez MRN# [id number]   YOB: 1970 Age: 48 year old      Date of Admission:  4/4/2018      CC:   Facial Swelling       Facial swelling afer oral biopsy today at 1100.          HPI: Obtained from the patient, chart review, ED provider ,    Zayra Ramirez is a 48 year old female with a history of morbid obesity, tobacco abuse, pulmonary alveolitis- ILD (per patient, denies COPD), HTN  who presents to the ED  With bilateral facial swelling, acute onset, following biopsy of R inner cheek area. Around 1100. Patient started feeling tightness around her both eyes right more than left starting around 1 PM followed by progressive swelling of both face.  Reports mild difficulty swallowing. no chest pain or dyspnea.  No wheezing.  No fever or chills.  No other dental problem at the current or discharge .     Patient had similar problem in 2015 after some dental procedure, once he was treated with IV antibiotics for some time and steroids.     Patient followed-up for surgery at [facility].     Reports no other problem.  Her chronic medical problem stable.     In ED, CT SCAN OF THE NECK AND FACE WITH CONTRAST  :   1. Extensive subcutaneous emphysema in both sides the face right greater than left .   2. Periodontal disease left maxillary molar region where tooth has been removed. No soft tissue abscess adjacent to this area of maxillary periodontal disease.  3. No right-sided osteomyelitis identified.   4. Right mandibular focus of osteomyelitis on prior study of 8/7/:15 has normalized in the interval.    Patient has not used her CPAP since the procedure.  She was given IV Unasyn ER.  ED provider discussed the case with oral surgery on call.  Recommended to continue IV antibiotics for now.  Neurosurgery to see the patient in the morning.  Patient reportedly had Steroids prior to the procedure.         Past Medical History:  Past Medical History:   Diagnosis Date      Asthma     copd     COPD (chronic obstructive pulmonary disease) (H)-pt denies, says she has ILD      Dental abscess 8-2015     Morbid obesity with BMI of 40.0-44.9, adult (H)        Past Surgical History:  Past Surgical History:   Procedure Laterality Date     COLONOSCOPY       ENT SURGERY       HC DRAIN SKIN ABSCESS SIMPLE/SINGLE  3/16/2012    Procedure:INCISION AND DRAINAGE, ABSCESS, SIMPLE; Surgeon:CHRISTIANO HANCOCK; Location:[facility]     HYSTERECTOMY       INCISION AND DRAINAGE ABDOMEN WASHOUT, COMBINED         Allergies:     Allergies   Allergen Reactions     Bees      Doxycycline Anaphylaxis     Erythromycin Anaphylaxis       Medications:    Current Facility-Administered Medications on File Prior to Encounter:  Lidocaine 1 % injection 0.5-5 mL   sodium chloride (PF) 0.9% PF flush 5-50 mL     Current Outpatient Prescriptions on File Prior to Encounter:  oxyCODONE (ROXICODONE) 5 MG immediate release tablet Take 1 tablet (5 mg) by mouth 3 times daily as needed for moderate to severe pain   DULOXETINE HCL PO Take 120 mg by mouth daily   LAMOTRIGINE PO Take 300 mg by mouth daily   fluticasone-salmeterol (ADVAIR) 500-50 MCG/DOSE diskus inhaler Inhale 1 puff into the lungs every 12 hours   montelukast (SINGULAIR) 10 MG tablet Take 10 mg by mouth At Bedtime   albuterol (PROAIR HFA, PROVENTIL HFA, VENTOLIN HFA) 108 (90 BASE) MCG/ACT inhaler Inhale 2 puffs into the lungs every 6 hours as needed for shortness of breath / dyspnea or wheezing   lisinopril-hydrochlorothiazide (PRINZIDE,ZESTORETIC) 20-12.5 MG per tablet Take 1 tablet by mouth daily   ARIPiprazole (ABILIFY PO) Take 10 mg by mouth daily.   ROPINIROLE HCL PO Take 2 mg by mouth nightly as needed    ipratropium - albuterol 0.5 mg/2.5 mg/3 mL (DUONEB) 0.5-2.5 (3) MG/3ML nebulization Take 1 vial (3 mLs) by nebulization 4 times daily for 7 days Use on a scheduled basis for next week, afterwards use it every 6 hours as needed (Q6h PRN)       Social  History:  Social History     Social History     Marital status: Single     Spouse name: N/A     Number of children: N/A     Years of education: N/A     Occupational History     Not on file.     Social History Main Topics     Smoking status: Current Every Day Smoker     Packs/day: 1.00     Types: Cigarettes     Smokeless tobacco: Never Used     Alcohol use No     Drug use: No     Sexual activity: Not on file     Other Topics Concern     Not on file     Social History Narrative       Family History: reviewed.   No pertinent hx.     ROS:  The remainder of the complete ROS was negative unless noted in the HPI.      Exam:    /66  Pulse 104  Temp 99  F (37.2  C) (Oral)  Resp 15  Wt 111.6 kg (246 lb)  LMP  (LMP Unknown)  SpO2 91%  Breastfeeding? No  BMI 42.23 kg/m2    Temp (24hrs), Av  F (37.2  C), Min:99  F (37.2  C), Max:99  F (37.2  C)      Wt Readings from Last 5 Encounters:   18 111.6 kg (246 lb)   16 111.9 kg (246 lb 12.8 oz)   16 113.4 kg (250 lb)   08/07/15 109.7 kg (241 lb 12.8 oz)   03/29/15 104 kg (229 lb 3.2 oz)       General: Alert, interactive, NAD, obese.   HEENT: AT/NC, diffuse swelling of both faces, R>L, including periorbital areas. TTP. Fine crackles on palpation. Oral cavity: noted suture R lower inner cheek area. No obvious drainage.   Neck: Supple, no stridor.   Resp/chest: clear to auscultation bilaterally, no crackles or wheezes.   Cardiac/Heart: s1s2 regular rate and rhythm, no murmur  GI/Abdomen: Soft, nontender, nondistended. +BS.  No rebound or guarding.  MSK/Extremities:  distally warm and well perfused.   Skin: Warm and dry, no jaundice or rash on exposed areas.   Neuro: Alert & oriented x 3, Cns 2-12 intact, moves all extremities equally  Psychiatry: stable mood.       Labs:    BMP  Recent Labs  Lab 18      POTASSIUM 3.6   CHLORIDE 102   NATALIYA 8.9   CO2 29   BUN 13   CR 0.73   *     CBC  Recent Labs  Lab 187   WBC 20.6*    RBC 4.95   HGB 14.9   HCT 44.4   MCV 90   MCH 30.1   MCHC 33.6   RDW 14.6        INRNo lab results found in last 7 days.  LFTs  Recent Labs  Lab 04/04/18  2147   ALKPHOS 100   AST 13   ALT 10   BILITOTAL 0.4   PROTTOTAL 7.5   ALBUMIN 3.8      PANCNo lab results found in last 7 days.    Imaging:   As above.         Assessment/ Plan:  Admitted after diffuse facial subcutaneous emphysema after biopsy of right side of her mouth.    #Facial subcutaneous emphysema, bilateral, right more than left:     No report of dyspnea, no stridor on exam, no wheezing.  No fever chills.  No clear evidence of cellulitis or abscess.   Stable hemodynamics.  Oxygenating and breathing fine on room air.  Patient non septic.  No lips or tongue swelling.    -Continue IV Unasyn 3 g every 6 hours.  -Oral surgery consultation in the morning  -Oral oxycodone as needed for pain  -acetaminophen, Naproxen as needed for pain  -Serial clinical exam, continuous pulse ox. Monitor vitals.   - Avoid CPAP use for now  - Oxygen via NC as needed is fine  -Consider steroid and contact on call oral surgery should her emphysema worsens or e/o respi compromise.     # Others:     Pulmonary: Current smoker.  History of pulmonary alveolitis, interstitial lung disease, per patient.  Denies COPD diagnoses.  Uses 2-3 L nasal cannula oxygen as needed & sleep at home.  ROBERT on CPAP    -Hold her CPAP for now.  -Continue her prior to admission inhalers  -Oxygen, titrate, to maintain saturation above 90  - pulse Ox  -Encouraged smoking cessation, nicotine patch    Hypertension:   -Continue prior to admission lisinopril hydrochlorothiazide combination  -Hydralazine as needed for systolic blood pressure more than 160    Depression, anxiety:   -Continue prior to admission medications  -Stable        # Pain Assessment:  Current Pain Score 6/13/2016   Patient currently in pain? yes   Pain score (0-10) 2   Pain location Rib cage   Pain descriptors Aching   - Zayra is  experiencing pain due to above. Pain management was discussed and the plan was created in a collaborative fashion.  Zayra's response to the current recommendations: engaged  - Please see the plan for pain management as documented above        FEN:adat to clear.   Prophylaxis: mechanical dvt ppx  IV Access: PIV  CODE: full     Disposition: Disposition Plan   Expected discharge in 2-3 days to prior living arrangement once medically ready.     Entered: Naman Flores 04/05/2018, 2:31 AM         D/w GREY Flores MD  House Physician  Pager: [phone]    4/5/2018

## 2018-04-05 NOTE — ED NOTES
Pt states that she always has facial swelling after the dentist works on the R side of her mouth. This is the most swelling she has ever had in her eyes. She states that her sinuses hurt

## 2018-04-05 NOTE — PLAN OF CARE
Problem: Skin and Soft Tissue Infection (Adult)  Goal: Signs and Symptoms of Listed Potential Problems Will be Absent, Minimized or Managed (Skin and Soft Tissue Infection)  Signs and symptoms of listed potential problems will be absent, minimized or managed by discharge/transition of care (reference Skin and Soft Tissue Infection (Adult) CPG).   Outcome: Improving  Focus: Physio  D: Facial swelling decreasing. Can open her eyes now. Is not impeding her breathing. Uses home oxygen when ambulating at 2 liters. Cannot use CPAP due to facial swelling so using 4 liters nasal cannula when she sleeps. Pain controlled on oxycodone, tylenol and aleve. Up independently. No bowel and bladder issues. P: Continue current plan of care.

## 2018-04-06 LAB
CRP SERPL-MCNC: 12.9 MG/L (ref 0–8)
ERYTHROCYTE [DISTWIDTH] IN BLOOD BY AUTOMATED COUNT: 14.9 % (ref 10–15)
HCT VFR BLD AUTO: 46 % (ref 35–47)
HGB BLD-MCNC: 14.4 G/DL (ref 11.7–15.7)
MCH RBC QN AUTO: 28.8 PG (ref 26.5–33)
MCHC RBC AUTO-ENTMCNC: 31.3 G/DL (ref 31.5–36.5)
MCV RBC AUTO: 92 FL (ref 78–100)
PLATELET # BLD AUTO: 270 10E9/L (ref 150–450)
PROCALCITONIN SERPL-MCNC: <0.05 NG/ML
RBC # BLD AUTO: 5 10E12/L (ref 3.8–5.2)
WBC # BLD AUTO: 8.8 10E9/L (ref 4–11)

## 2018-04-06 PROCEDURE — 36415 COLL VENOUS BLD VENIPUNCTURE: CPT | Performed by: INTERNAL MEDICINE

## 2018-04-06 PROCEDURE — 99233 SBSQ HOSP IP/OBS HIGH 50: CPT | Performed by: INTERNAL MEDICINE

## 2018-04-06 PROCEDURE — 86140 C-REACTIVE PROTEIN: CPT | Performed by: INTERNAL MEDICINE

## 2018-04-06 PROCEDURE — 25000132 ZZH RX MED GY IP 250 OP 250 PS 637: Performed by: INTERNAL MEDICINE

## 2018-04-06 PROCEDURE — 12000001 ZZH R&B MED SURG/OB UMMC

## 2018-04-06 PROCEDURE — 25000128 H RX IP 250 OP 636: Performed by: INTERNAL MEDICINE

## 2018-04-06 PROCEDURE — 84145 PROCALCITONIN (PCT): CPT | Performed by: INTERNAL MEDICINE

## 2018-04-06 PROCEDURE — 85027 COMPLETE CBC AUTOMATED: CPT | Performed by: INTERNAL MEDICINE

## 2018-04-06 RX ORDER — HYDROCHLOROTHIAZIDE 12.5 MG/1
12.5 CAPSULE ORAL ONCE
Status: COMPLETED | OUTPATIENT
Start: 2018-04-06 | End: 2018-04-06

## 2018-04-06 RX ORDER — LISINOPRIL AND HYDROCHLOROTHIAZIDE 20; 25 MG/1; MG/1
1 TABLET ORAL DAILY
Status: DISCONTINUED | OUTPATIENT
Start: 2018-04-07 | End: 2018-04-08 | Stop reason: HOSPADM

## 2018-04-06 RX ADMIN — LISINOPRIL AND HYDROCHLOROTHIAZIDE 1 TABLET: 12.5; 2 TABLET ORAL at 08:12

## 2018-04-06 RX ADMIN — AMPICILLIN SODIUM AND SULBACTAM SODIUM 3 G: 2; 1 INJECTION, POWDER, FOR SOLUTION INTRAMUSCULAR; INTRAVENOUS at 06:39

## 2018-04-06 RX ADMIN — AMPICILLIN SODIUM AND SULBACTAM SODIUM 3 G: 2; 1 INJECTION, POWDER, FOR SOLUTION INTRAMUSCULAR; INTRAVENOUS at 00:21

## 2018-04-06 RX ADMIN — FLUTICASONE PROPIONATE AND SALMETEROL 1 PUFF: 50; 500 POWDER RESPIRATORY (INHALATION) at 20:26

## 2018-04-06 RX ADMIN — HYDRALAZINE HYDROCHLORIDE 10 MG: 20 INJECTION INTRAMUSCULAR; INTRAVENOUS at 10:46

## 2018-04-06 RX ADMIN — HYDROCHLOROTHIAZIDE 12.5 MG: 12.5 CAPSULE ORAL at 10:46

## 2018-04-06 RX ADMIN — ARIPIPRAZOLE 10 MG: 10 TABLET ORAL at 08:12

## 2018-04-06 RX ADMIN — FLUTICASONE PROPIONATE AND SALMETEROL 1 PUFF: 50; 500 POWDER RESPIRATORY (INHALATION) at 11:53

## 2018-04-06 RX ADMIN — AMPICILLIN SODIUM AND SULBACTAM SODIUM 3 G: 2; 1 INJECTION, POWDER, FOR SOLUTION INTRAMUSCULAR; INTRAVENOUS at 18:53

## 2018-04-06 RX ADMIN — DULOXETINE HYDROCHLORIDE 60 MG: 60 CAPSULE, DELAYED RELEASE ORAL at 20:12

## 2018-04-06 RX ADMIN — OXYCODONE HYDROCHLORIDE 10 MG: 5 TABLET ORAL at 16:27

## 2018-04-06 RX ADMIN — DULOXETINE HYDROCHLORIDE 60 MG: 60 CAPSULE, DELAYED RELEASE ORAL at 08:12

## 2018-04-06 RX ADMIN — LAMOTRIGINE 150 MG: 150 TABLET ORAL at 20:11

## 2018-04-06 RX ADMIN — AMPICILLIN SODIUM AND SULBACTAM SODIUM 3 G: 2; 1 INJECTION, POWDER, FOR SOLUTION INTRAMUSCULAR; INTRAVENOUS at 11:54

## 2018-04-06 RX ADMIN — NAPROXEN 500 MG: 500 TABLET ORAL at 06:39

## 2018-04-06 RX ADMIN — LAMOTRIGINE 150 MG: 150 TABLET ORAL at 08:12

## 2018-04-06 RX ADMIN — OXYCODONE HYDROCHLORIDE 10 MG: 5 TABLET ORAL at 08:17

## 2018-04-06 RX ADMIN — NICOTINE 1 PATCH: 21 PATCH, EXTENDED RELEASE TRANSDERMAL at 08:12

## 2018-04-06 RX ADMIN — MONTELUKAST SODIUM 10 MG: 10 TABLET, COATED ORAL at 20:12

## 2018-04-06 NOTE — PLAN OF CARE
Problem: Patient Care Overview  Goal: Individualization & Mutuality  Pt A&O x's 4. VSS. Afebrile. 02 sats in the mid 90s on 2LPM, pt uses 02 at home mostly when sleeping and when walking long distance.  Lungs clear. Denies SOB, CP and nausea. Tolerating regular diet. Bowel sound active in all quadrants. No BM but passing gas. Voiding adequately into the toilet.  Pain well managed with  Prn oxycodone and naproxen. CMS intact, denies N/T.facial swelling improving.  PIV patent and infusing abx. Pt slept through the night and is able to make needs known, call light with in reach. Will continue to monitor.

## 2018-04-06 NOTE — PLAN OF CARE
Problem: Patient Care Overview  Goal: Individualization & Mutuality  Outcome: Improving  Patient A&O x4, lungs sound clear, Bowel sound active- passing gas but no BM yet, Denied CP, lightheadedness, dizziness, and SOB, drinking well and voiding spontaneously without difficulties, pain tolerable and taking oxycodone for pain, right face swelling resolving as per patieint, demonstrates the ability to use call light appropriately, will continue to monitor patient.

## 2018-04-06 NOTE — PLAN OF CARE
"Problem: Skin and Soft Tissue Infection (Adult)  Goal: Signs and Symptoms of Listed Potential Problems Will be Absent, Minimized or Managed (Skin and Soft Tissue Infection)  Signs and symptoms of listed potential problems will be absent, minimized or managed by discharge/transition of care (reference Skin and Soft Tissue Infection (Adult) CPG).   Outcome: Improving    VS:     VSS on 2L O2 with humidification O2 Stats 92-94%   Output:     Voids using bathroom  BM 4/4 and passing gas   Activity:     UP independent in room   Skin: WDL ex severe facial edema to rt and lt face   Pain:     PRN 10mg oxycodone given 3x daily (0817)  Alternative therapies offered  Facial pain described as \"throbbing\"   Neuro/CMS:     Rt face numbness or no tingling   Dressing:     NA   Diet:     Regular diet, tolerated well, no N/V   LDA:     IV Sl and patent   Equipment:     O2 with humidification, PCD, IS at bedside   Plan:     Continue plan of care and pain management. Continue Abx treatments   Additional Info:     Pt would like O2 machine to be set with 87% parameters; received acupuncture                "

## 2018-04-06 NOTE — PROGRESS NOTES
Acupuncture Clinical Internship Intake and Treatment Documentation   [facility]    Date:  4/6/2018  Patient Name:  Zayra Ramirez   YOB: 1970     Repeat Patient:  no  Has patient had acupoint/acupressure treatment before:  no    Signed consent placed in the medical record:  yes  Patient/Family verbalizes understanding of risks and benefits:  yes  Required information provided to patient:  yes    Diagnosis:  Facial cellulitis [L03.211]  Subcutaneous emphysema after procedure [T81.82XA]    Patient condition and treatment:  Patient has swelling in her face  Reason for Intervention Today/Chief Complaint:  Patient also complains of cervical neck pain and low back pain    Isolation:  No  Type:  None    PRE-SCORE:  moderate    Other Western medical information:  Diagnosis for why she is in the hospital today is unknown    Medications    Current Facility-Administered Medications:      fluticasone-salmeterol (ADVAIR) 500-50 MCG/DOSE diskus inhaler 1 puff, 1 puff, Inhalation, Q12H, Koib Fisher MD, 1 puff at 04/06/18 1153     [START ON 4/7/2018] lisinopril-hydrochlorothiazide (PRINZIDE/ZESTORETIC) 20-25 MG per tablet 1 tablet, 1 tablet, Oral, Daily, Kobi Fisher MD     albuterol (PROAIR HFA/PROVENTIL HFA/VENTOLIN HFA) Inhaler 2 puff, 2 puff, Inhalation, Q6H PRN, Naman Flores MD     montelukast (SINGULAIR) tablet 10 mg, 10 mg, Oral, At Bedtime, Naman Flores MD, 10 mg at 04/05/18 2126     naproxen (NAPROSYN) tablet 500 mg, 500 mg, Oral, BID PRN, Naman Flores MD, 500 mg at 04/06/18 0639     oxyCODONE IR (ROXICODONE) tablet 5-10 mg, 5-10 mg, Oral, TID PRN, Naman Flores MD, 10 mg at 04/06/18 0817     rOPINIRole (REQUIP) tablet 2 mg, 2 mg, Oral, At Bedtime PRN, Naman Flores MD     naloxone (NARCAN) injection 0.1-0.4 mg, 0.1-0.4 mg, Intravenous, Q2 Min PRN, Naman Flores MD     melatonin tablet 1 mg, 1 mg, Oral, At Bedtime PRN, Naman Flores MD      acetaminophen (TYLENOL) tablet 650 mg, 650 mg, Oral, Q4H PRN, Naman Flores MD, 650 mg at 04/05/18 2126     ondansetron (ZOFRAN-ODT) ODT tab 4 mg, 4 mg, Oral, Q6H PRN **OR** ondansetron (ZOFRAN) injection 4 mg, 4 mg, Intravenous, Q6H PRN, Naman Flores MD     calcium carbonate (TUMS) chewable tablet 1,000 mg, 1,000 mg, Oral, 4x Daily PRN, Naman Flores MD     ARIPiprazole (ABILIFY) tablet 10 mg, 10 mg, Oral, Daily, Naman Flores MD, 10 mg at 04/06/18 0812     ampicillin-sulbactam (UNASYN) 3 g vial to attach to  mL bag, 3 g, Intravenous, Q6H, Naman Flores MD, 3 g at 04/06/18 1154     hydrALAZINE (APRESOLINE) injection 10 mg, 10 mg, Intravenous, Q6H PRN, Naman Flores MD, 10 mg at 04/06/18 1046     nicotine Patch in Place, , Transdermal, Q8H, Naman Flores MD     nicotine patch REMOVAL, , Transdermal, Daily, Naman Flores MD     nicotine (NICODERM CQ) 21 MG/24HR 24 hr patch 1 patch, 1 patch, Transdermal, Daily, Naman Flores MD, 1 patch at 04/06/18 0812     DULoxetine (CYMBALTA) EC capsule 60 mg, 60 mg, Oral, BID, Kobi Fisher MD, 60 mg at 04/06/18 0812     lamoTRIgine (LaMICtal) tablet 150 mg, 150 mg, Oral, BID, Kobi Fisher MD, 150 mg at 04/06/18 0812    Facility-Administered Medications Ordered in Other Encounters:      Lidocaine 1 % injection 0.5-5 mL, 0.5-5 mL, Other, Once PRN, Gutierrez Candelario MD     sodium chloride (PF) 0.9% PF flush 5-50 mL, 5-50 mL, Intracatheter, Once PRN, Gutierrez Candelario MD      Pre-Treatment Assessment  Chief Complaint/ Reason for Intervention Today:  Patient has degenerative disc disease in her cervical and lumbar spine.   Chief Complaint Pre-Score:  moderate   Describe:  She had surgery on her cervical vertebrae in October 2017. She feels stiffness through GB 20 and GB 21. She hasn't found anything that helps it feel better. She also has low back degenerative disc disease which causes the sides of her R leg to be numb. She hasn't found anything to  help that either  Pain Location:  Neck and low back  Pre Session Pain:  Moderate  Pre Session Anxiety:  Mild  Pre Session Nausea:  None    10 Traditional Chinese Medicine Assessment Questions  - Cold/ Heat:  Running hot and cold, but mostly warm  - Sweat:  Spontaneous sweat and night sweating that has been going on for years a few nights per week  - Headaches/Body aches:  She has been having headaches in the temples and occipital areas very frequently for two weeks. Her head feels tender and it is a dull ache sensation  - Chest/Abdomen:  High blood pressure currently, she also has heartburn, and lots of airy gas recently  - Digestion:  Low appetite  - Bowel Movement/Urination:  Last BM was Wednesday morning (4-4-18), but she has had looser stools the past few weeks other than that. She is urinating every hour, more at night, and having frequent copious urination  - Hearing/Vision:  Pounding in ears at night, mild tinnitus rarely  - Sleep (prior to hospital):  She sleeps with a CPAP machine and oxygen. She has difficulty staying asleep. She is usually in bed for 12 hours, but can't stay asleep through the night.  - Energy:  Low energy  - Emotions:  Anxiety and depression (on medication for the depression)  - Ob Gyn:  She had a hysterectomy in 1997  - Miscellaneous:  Patient has asthma as well    Traditional Chinese Medicine Assessment  - TONGUE:  Fissured, dry, red body  - PULSE:  L) full, tense, slippery R) thin, thready, wiry in the cun, tense overall  - OBSERVATIONS:  Patient has facial swelling and redness     Traditional Chinese Medicine Diagnosis  - BRANCH:  Liver yang rising  - ROOT:  Kidney yin and yang deficiency     Traditional Chinese Medicine Treatment  - ACUPUNCTURE:  Yee 3-->2, GB 34 (R), Jannette 7 (L), TB 6 (L), SP 9, Kid 3, auricular: shenmen, cervical, lumbosacral  - NEEDLE COUNT: In: 15   Out: 15    Time In: 2:35PM     Magnet informed consent signed and given:  no    Post Treatment Assessment  Chief  complaint post score:  better  Post Session Observation:  Patient seems calmer and more relaxed about her situation  Patient/Family Education:  Provided information for [email or web address] website  Verbal information provided:  yes  Written information provided:  yes  All questions answered at time of treatment:  yes    Treatment/Procedure(s) performed by:  Flora Jordan (intern)    Date: 4/6/2018     [facility]  Supervising acupuncturist:  Nicholas Kohli LAc [facility] Ac[facility] license #: [id number]  P: [phone]

## 2018-04-06 NOTE — CONSULTS
[facility] ID SERVICE: NEW CONSULTATION     Patient:  Zayra Ramirez, Date of birth 1970, Medical record number [id number]  Date of Visit:  April 5, 2018         Assessment and Recommendations:   Problem List:  1. Bilateral subcutaneous emphysema of face after biopsy of oral lesion  2. History of facial swelling after biopsy in 2/18 - no imaging needed. Treated by dentist as outpatient.   3. History of facial swelling due to dental abscess in 2015 - CT showed edema and mandibular osteomyelitis at that time - no emphysema  4. History of recurrent breast and groin abscesses - the one culture on record had MSSA and anaerobes  5. Asthma  6. CPAP - but not used after procedure per patient    Discussion:  Ms. Ramirez certainly has an unusual presentation, especially in the absence of usual risk factors for subcutaneous emphysema after a procedure (use of air driven tools, severe coughing in the setting of asthma, etc.). The main concern from an infectious diseases standpoint would be emphysema due to a gas forming organism. However, the rapid onset directly after the procedure and patient report of improvement today argue against a severe infection. Leukocytosis on admission could be due to steroids reportedly given with procedure. At this point, Unasyn is an appropriate antibiotic choice - even if infection is not the underlying cause of the emphysema, case reports suggest that the process of air entering the tissue space can introduce bacteria. Depending on clinical course, she may be able to transition to oral antibiotics on discharge.     Recommendations:  1. Continue Unasyn. If progressive symptoms, call ID immediately (day or night).  2. Monitor for chest pain/dyspnea suggesting extension of emphysema - case reports suggest possible extension into mediastinum  3. I will obtain records from dentistry to see if we have more information about the 2/19 episode  4. Patient can follow-up with me in clinic  regarding ongoing treatment of recurrent MSSA furunculosis (vs. less likely possible mild hidradenitis)     Thank you for this consult. ID will continue to follow this patient. Please feel free to call with any questions.     Isamar Arambula MD  Infectious Diseases  [phone]        History of Present Illness:   Ms. John is a 48-year-old woman who was admitted on 4/4/2018 due to development of first the lateral and then bilateral facial swelling after a biopsy of an oral lesion that was done using a scalpel.  Due to a history of swelling with procedure she was reportedly given Decadron and clindamycin at the time of the procedure.  Despite this, she reported development of facial swelling within several minutes of returning home.  This progressed and she was admitted on the evening of 4/4/2018.  At that point she had a leukocytosis to 20 and a CT showed extensive bilateral facial subcutaneous emphysema.  She was started on Unasyn.  When seen today, she reports that she is feeling overall slightly better than yesterday and does not think that she is having progression of swelling at this point.    She does have a history of facial swelling most notably in 2015 when she had a known tooth abscess with associated mandibular osteomyelitis that was managed by the infectious disease group at [facility].  CTs from that time showed a more typical picture with edema rather than emphysema although per report her swelling was impressive.  She also had an episode of swelling when her nonhealing oral lesion was biopsied in February 2018.  We do not have records of that here which may be due to the fact that she reports it was managed rather easily as an outpatient by the dental team.  She does not remember having a CT with that episode.    Finally, the patient reports a history for the last several years of ongoing groin and breast abscesses.  These temporarily improved with a chlorhexidine bath but is since returned.  She has  1 small growing lesion right now but no other active lesions.  She does have some mild scarring related to these episodes.  She did not have extensive infections as a child.            Review of Systems:   CONSTITUTIONAL:  No fevers or chills  EYES: negative for icterus  ENT: As in HPI.  Nonhealing oral lesion still has no clear etiology  RESPIRATORY: Ongoing asthma symptoms.  Some episodic coughing but no severe coughing remembered yesterday.    CARDIOVASCULAR:  negative for chest pain, palpitations  GASTROINTESTINAL:  negative for nausea, vomiting, diarrhea and constipation  GENITOURINARY:  negative for dysuria  HEME:  No easy bruising/bleeding  INTEGUMENT:  negative for rash and pruritus  NEURO:  Negative for headache       Past Medical History:     Past Medical History:   Diagnosis Date     Asthma     copd     COPD (chronic obstructive pulmonary disease) (H)      Dental abscess 8-2015     Morbid obesity with BMI of 40.0-44.9, adult (H)          Allergies:      Allergies   Allergen Reactions     Bees      Doxycycline Anaphylaxis     Erythromycin Anaphylaxis           Recent Antimicrobials::   Clindamycin with procedure  In 2015 she improved with Unasyn and then was transitioned to ertapenem for ease of outpatient administration.  She received a multiple week course of IV ertapenem at that time.         Family History:   Sister with eczema.  No family members with recurrent infections.       Social History:     Social History     Social History     Marital status: Single     Spouse name: N/A     Number of children: N/A     Years of education: N/A     Occupational History     Not on file.     Social History Main Topics     Smoking status: Current Every Day Smoker     Packs/day: 1.00     Types: Cigarettes     Smokeless tobacco: Never Used     Alcohol use No     Drug use: No     Sexual activity: Not on file     Other Topics Concern     Not on file     Social History Narrative              Physical Exam:   Ranges  forvital signs:  Temp:  [95.5  F (35.3  C)-98.7  F (37.1  C)] 97.8  F (36.6  C)  Pulse:  [64] 64  Heart Rate:  [69-83] 83  Resp:  [16] 16  BP: (155-165)/(72-89) 156/72  SpO2:  [94 %-98 %] 98 %    Intake/Output Summary (Last 24 hours) at 18 0626  Last data filed at 18 1900   Gross per 24 hour   Intake              450 ml   Output                0 ml   Net              450 ml       Exam:  GENERAL:  well-developed, well-nourished, sitting in bed in no acute distress.   ENT: Significant bilateral facial swelling.  Please see OMFS note from earlier today for pictures.  Unable to examine oral lesion at this time.  EYES: Difficult to examine due to swelling.  NECK:  Supple.  LUNGS:  Clear to auscultation.  CARDIOVASCULAR:  Regular rate and rhythm with no murmurs, gallops or rubs.  ABDOMEN:  Normal bowel sounds, soft, nontender.  EXT: Extremities warm and without edema.  SKIN:  No acute rashes.  Line is in place without any surrounding erythema.  NEUROLOGIC:  Grossly nonfocal.         Laboratory Data:     Creatinine   Date Value Ref Range Status   2018 0.73 0.52 - 1.04 mg/dL Final   2016 0.85 0.52 - 1.04 mg/dL Final   2016 0.62 0.52 - 1.04 mg/dL Final   2016 0.78 0.52 - 1.04 mg/dL Final   2016 0.74 0.52 - 1.04 mg/dL Final     WBC   Date Value Ref Range Status   2018 17.8 (H) 4.0 - 11.0 10e9/L Final   2018 20.6 (H) 4.0 - 11.0 10e9/L Final   2016 11.0 4.0 - 11.0 10e9/L Final   2016 10.6 4.0 - 11.0 10e9/L Final   2015 8.6 4.0 - 11.0 10e9/L Final     Hemoglobin   Date Value Ref Range Status   2018 14.1 11.7 - 15.7 g/dL Final     Platelet Count   Date Value Ref Range Status   2018 294 150 - 450 10e9/L Final     Lab Results   Component Value Date     2018    BUN 13 2018    CO2 29 2018          Pertinent Recent Microbiology Data:   Breast abscess aspirate from  with MSSA and mixed anaerobes         Imagin2018  maxillofacial CT:  IMPRESSION:     1. Extensive subcutaneous emphysema in both sides the face right  greater than left as described. The source of the subcutaneous  emphysema was not determined.    2. Periodontal disease left maxillary molar region where tooth has  been removed. No soft tissue abscess adjacent to this area of  maxillary periodontal disease.  3. No right-sided osteomyelitis identified.   4. Right mandibular focus of osteomyelitis on prior study of 8/7/:15  has normalized in the interval.

## 2018-04-06 NOTE — PROGRESS NOTES
Progress Note        Zayra Ramirez [[id number]] (F) - 48 year old          Assessment and Plan:      Admitted after diffuse facial subcutaneous emphysema after biopsy of right side of her mouth.     #Facial subcutaneous emphysema, bilateral, right more than left:      No report of dyspnea, no stridor on exam, no wheezing.  No fever chills.  No clear evidence of cellulitis or abscess.   Stable hemodynamics.  Oxygenating and breathing fine on room air.  Patient non septic.  No lips or tongue swelling.     -Seen by oral surgery and infectious disease  -Continue the Unasyn for now, this can be changed to Augmentin at the time of discharge  -Significantly improved in the last 24 hours  Continue oral oxycodone for the pain relief  - Avoid CPAP use for now  - Oxygen via NC as needed is fine          Pulmonary: Current smoker.  History of pulmonary alveolitis, interstitial lung disease, per patient.  Denies COPD diagnoses.  Uses 2-3 L nasal cannula oxygen as needed & sleep at home.  ROBERT on CPAP     -Hold her CPAP for now.  -Continue her prior to admission inhalers  -Oxygen, titrate, to maintain saturation above 90  - pulse Ox  -Encouraged smoking cessation, nicotine patch     Hypertension:   -Continue prior to admission lisinopril hydrochlorothiazide combination  -Hydralazine as needed for systolic blood pressure more than 160     Depression, anxiety:   -Continue prior to admission medications  -Stable    Subjective:  Feels obvious swelling is much improved and she is feeling less pain today, she is able to open the both eyes  No fever no chills  No difficulty in eating food no difficulty in breathing      Ros:  4-point ROS negative except in subjective/interval history.       Objective         Physical Exam:  /80  Pulse 82  Temp 98.9  F (37.2  C) (Oral)  Resp 18  Wt 111.6 kg (246 lb)  LMP  (LMP Unknown)  SpO2 92%  Breastfeeding? No  BMI 42.23 kg/m2  General: No distress, cooperative, pleasant  HEENT:  Bilateral swelling of the face which appears markedly improved, still has puffiness around the eyelids more on the right side, tenderness over right cheek, and also reports some numbness.    Pulmonary: CTAB, normal respiratory effort. No wheezes, rales or rhonchi   Cardiovascular: RRR. S1 and S2 preset. No m/g/r.  Abdomen:BS+, soft, non-tender, non-distended. No guarding or rebound tenderness.   Extremities: Warm and well perfused. No lower extremity edema.  Neuro: Alert and oriented x 3. Moves all extremities symmetrically.     LABS (Last four results)  CMP  Recent Labs  Lab 04/04/18  2147      POTASSIUM 3.6   CHLORIDE 102   CO2 29   ANIONGAP 7   *   BUN 13   CR 0.73   GFRESTIMATED 86   GFRESTBLACK >90   NATALIYA 8.9   PROTTOTAL 7.5   ALBUMIN 3.8   BILITOTAL 0.4   ALKPHOS 100   AST 13   ALT 10     CBC  Recent Labs  Lab 04/06/18  0613 04/05/18  0553 04/04/18  2147   WBC 8.8 17.8* 20.6*   RBC 5.00 4.88 4.95   HGB 14.4 14.1 14.9   HCT 46.0 43.8 44.4   MCV 92 90 90   MCH 28.8 28.9 30.1   MCHC 31.3* 32.2 33.6   RDW 14.9 14.6 14.6    294 279          Kobi Fisher  Hospitalist   [facility], [place]     4/6/2018

## 2018-04-06 NOTE — PLAN OF CARE
Problem: Patient Care Overview  Goal: Plan of Care/Patient Progress Review  Outcome: Improving  BP elevated otherwise VSS and afebrile. SpO2 94% on 2L via NC.  Lungs clear.  Facial  swelling improving, able to open and close eyes.  Tolerates regular diet and no swallowing difficulty.   IV SL between IV abx ( Unasyn 3g q 6hrs).   Up independently and voiding ok.  LBM yesterday. No diarrhea or constipation.   Nicotine patch in place.  Pain controlled with ice pack, oxycodone 5mg x2, tylenol 650mg x1.  Not able to wear CPAP due to facial swelling.  Oxygen on 4L via NC for night sleep.

## 2018-04-07 LAB
ERYTHROCYTE [DISTWIDTH] IN BLOOD BY AUTOMATED COUNT: 14.7 % (ref 10–15)
HCT VFR BLD AUTO: 47.4 % (ref 35–47)
HGB BLD-MCNC: 15.5 G/DL (ref 11.7–15.7)
MCH RBC QN AUTO: 29.6 PG (ref 26.5–33)
MCHC RBC AUTO-ENTMCNC: 32.7 G/DL (ref 31.5–36.5)
MCV RBC AUTO: 91 FL (ref 78–100)
PLATELET # BLD AUTO: 266 10E9/L (ref 150–450)
RBC # BLD AUTO: 5.23 10E12/L (ref 3.8–5.2)
WBC # BLD AUTO: 10.3 10E9/L (ref 4–11)

## 2018-04-07 PROCEDURE — 25000131 ZZH RX MED GY IP 250 OP 636 PS 637: Performed by: INTERNAL MEDICINE

## 2018-04-07 PROCEDURE — 25000132 ZZH RX MED GY IP 250 OP 250 PS 637: Performed by: INTERNAL MEDICINE

## 2018-04-07 PROCEDURE — 85027 COMPLETE CBC AUTOMATED: CPT | Performed by: INTERNAL MEDICINE

## 2018-04-07 PROCEDURE — 25000128 H RX IP 250 OP 636: Performed by: INTERNAL MEDICINE

## 2018-04-07 PROCEDURE — 99233 SBSQ HOSP IP/OBS HIGH 50: CPT | Performed by: INTERNAL MEDICINE

## 2018-04-07 PROCEDURE — 36415 COLL VENOUS BLD VENIPUNCTURE: CPT | Performed by: INTERNAL MEDICINE

## 2018-04-07 PROCEDURE — 12000001 ZZH R&B MED SURG/OB UMMC

## 2018-04-07 RX ORDER — LABETALOL HYDROCHLORIDE 5 MG/ML
10 INJECTION, SOLUTION INTRAVENOUS ONCE
Status: COMPLETED | OUTPATIENT
Start: 2018-04-07 | End: 2018-04-07

## 2018-04-07 RX ORDER — AMLODIPINE BESYLATE 5 MG/1
5 TABLET ORAL DAILY
Status: DISCONTINUED | OUTPATIENT
Start: 2018-04-07 | End: 2018-04-08

## 2018-04-07 RX ORDER — HYDRALAZINE HYDROCHLORIDE 20 MG/ML
20 INJECTION INTRAMUSCULAR; INTRAVENOUS EVERY 6 HOURS PRN
Status: DISCONTINUED | OUTPATIENT
Start: 2018-04-07 | End: 2018-04-07

## 2018-04-07 RX ORDER — HYDRALAZINE HYDROCHLORIDE 20 MG/ML
10 INJECTION INTRAMUSCULAR; INTRAVENOUS ONCE
Status: COMPLETED | OUTPATIENT
Start: 2018-04-07 | End: 2018-04-07

## 2018-04-07 RX ORDER — AMOXICILLIN 250 MG
1-2 CAPSULE ORAL 2 TIMES DAILY
Status: DISCONTINUED | OUTPATIENT
Start: 2018-04-07 | End: 2018-04-08 | Stop reason: HOSPADM

## 2018-04-07 RX ORDER — OXYCODONE HYDROCHLORIDE 5 MG/1
5-10 TABLET ORAL EVERY 6 HOURS PRN
Status: DISCONTINUED | OUTPATIENT
Start: 2018-04-07 | End: 2018-04-08 | Stop reason: HOSPADM

## 2018-04-07 RX ADMIN — OXYCODONE HYDROCHLORIDE 10 MG: 5 TABLET ORAL at 22:26

## 2018-04-07 RX ADMIN — Medication 10 MG: at 05:44

## 2018-04-07 RX ADMIN — LAMOTRIGINE 150 MG: 150 TABLET ORAL at 08:03

## 2018-04-07 RX ADMIN — MONTELUKAST SODIUM 10 MG: 10 TABLET, COATED ORAL at 22:26

## 2018-04-07 RX ADMIN — AMOXICILLIN AND CLAVULANATE POTASSIUM 1 TABLET: 875; 125 TABLET, FILM COATED ORAL at 20:15

## 2018-04-07 RX ADMIN — AMPICILLIN SODIUM AND SULBACTAM SODIUM 3 G: 2; 1 INJECTION, POWDER, FOR SOLUTION INTRAMUSCULAR; INTRAVENOUS at 00:26

## 2018-04-07 RX ADMIN — OXYCODONE HYDROCHLORIDE 10 MG: 5 TABLET ORAL at 16:24

## 2018-04-07 RX ADMIN — AMPICILLIN SODIUM AND SULBACTAM SODIUM 3 G: 2; 1 INJECTION, POWDER, FOR SOLUTION INTRAMUSCULAR; INTRAVENOUS at 06:05

## 2018-04-07 RX ADMIN — ACETAMINOPHEN 650 MG: 325 TABLET ORAL at 20:16

## 2018-04-07 RX ADMIN — AMOXICILLIN AND CLAVULANATE POTASSIUM 1 TABLET: 875; 125 TABLET, FILM COATED ORAL at 13:17

## 2018-04-07 RX ADMIN — DULOXETINE HYDROCHLORIDE 60 MG: 60 CAPSULE, DELAYED RELEASE ORAL at 20:16

## 2018-04-07 RX ADMIN — SENNOSIDES AND DOCUSATE SODIUM 2 TABLET: 8.6; 5 TABLET ORAL at 20:16

## 2018-04-07 RX ADMIN — LAMOTRIGINE 150 MG: 150 TABLET ORAL at 20:16

## 2018-04-07 RX ADMIN — HYDRALAZINE HYDROCHLORIDE 10 MG: 20 INJECTION INTRAMUSCULAR; INTRAVENOUS at 01:45

## 2018-04-07 RX ADMIN — NAPROXEN 500 MG: 500 TABLET ORAL at 20:16

## 2018-04-07 RX ADMIN — DULOXETINE HYDROCHLORIDE 60 MG: 60 CAPSULE, DELAYED RELEASE ORAL at 08:03

## 2018-04-07 RX ADMIN — ACETAMINOPHEN 650 MG: 325 TABLET ORAL at 15:15

## 2018-04-07 RX ADMIN — ROPINIROLE HYDROCHLORIDE 2 MG: 2 TABLET, FILM COATED ORAL at 22:46

## 2018-04-07 RX ADMIN — FLUTICASONE PROPIONATE AND SALMETEROL 1 PUFF: 50; 500 POWDER RESPIRATORY (INHALATION) at 20:18

## 2018-04-07 RX ADMIN — ACETAMINOPHEN 650 MG: 325 TABLET ORAL at 08:28

## 2018-04-07 RX ADMIN — LISINOPRIL AND HYDROCHLOROTHIAZIDE 1 TABLET: 25; 20 TABLET ORAL at 08:08

## 2018-04-07 RX ADMIN — HYDRALAZINE HYDROCHLORIDE 10 MG: 20 INJECTION INTRAMUSCULAR; INTRAVENOUS at 15:10

## 2018-04-07 RX ADMIN — OXYCODONE HYDROCHLORIDE 10 MG: 5 TABLET ORAL at 05:53

## 2018-04-07 RX ADMIN — SENNOSIDES AND DOCUSATE SODIUM 1 TABLET: 8.6; 5 TABLET ORAL at 08:04

## 2018-04-07 RX ADMIN — FLUTICASONE PROPIONATE AND SALMETEROL 1 PUFF: 50; 500 POWDER RESPIRATORY (INHALATION) at 08:04

## 2018-04-07 RX ADMIN — OXYCODONE HYDROCHLORIDE 10 MG: 5 TABLET ORAL at 00:35

## 2018-04-07 RX ADMIN — AMLODIPINE BESYLATE 5 MG: 5 TABLET ORAL at 12:12

## 2018-04-07 RX ADMIN — NICOTINE 1 PATCH: 21 PATCH, EXTENDED RELEASE TRANSDERMAL at 07:59

## 2018-04-07 RX ADMIN — ARIPIPRAZOLE 10 MG: 10 TABLET ORAL at 08:03

## 2018-04-07 RX ADMIN — HYDRALAZINE HYDROCHLORIDE 10 MG: 20 INJECTION INTRAMUSCULAR; INTRAVENOUS at 03:35

## 2018-04-07 ASSESSMENT — PAIN DESCRIPTION - DESCRIPTORS: DESCRIPTORS: THROBBING

## 2018-04-07 NOTE — PLAN OF CARE
Problem: Patient Care Overview  Goal: Plan of Care/Patient Progress Review  Outcome:  No Change  Patient's BP improved (lower systolic as well as diastolic) this morning.  When rechecked at lunchtime, after a walk to the bathroom and sitting in the chair, Systolic again in the 160s.  Patient did voice being pleased with the decrease in facial swelling.   MD rounding now, and has reviewed BPs.  Norvasc was added, and has been given.  He has also discontinued the IV antibiotic, and changed to Augmentin (po).  Patient voices that Augmentin has caused a yeast infection in the past, and has required her to need to take another medication for that. This information was shared with Dr Fisher.         He was also made aware that sleep apnea has been noted in the daytime today, with drops in her Oxygen saturation to mid 80s (nasal cannula has been in place).  When awake, she has maintained oxygen sats in mid 90s, as high as 97%, again with oxgen @3L/NC.  He confirmed that she needs to avoid her CPAP.    1545:  Patient ambulated in renee with her visitor, using portable oxygen at 3L/NC.  During her walk, she again developed a posterior region headache, with right temporal involvement.   She was given Tylenol.  Her BP was checked after being up, with result of Systolic PT=600.  Hydralazine 10 mg (IV) was given.  Last BP check=146/79.  She mentioned anterior chest soreness. It is midsternal, and she winces with gentle palpation.  She has both Oxycodone and Naproxen available, but chooses to hold off on both for now.

## 2018-04-07 NOTE — PROGRESS NOTES
[facility] SERVICE: Progress Note     Patient:  Zarya Ramirez, Date of birth 1970, Medical record number [id number]  Date of Visit:  April 6, 2018         Assessment and Recommendations:   Problem List:  1. Bilateral subcutaneous emphysema of face after biopsy of oral lesion.  Significantly improved.  On Unasyn.  2. History of facial swelling after biopsy in 2/18 - no imaging needed. Treated by dentist as outpatient.   3. History of facial swelling due to dental abscess in 2015 - CT showed edema and mandibular osteomyelitis at that time - no emphysema  4. History of recurrent breast and groin abscesses - the one culture on record had MSSA and anaerobes  5. Nonhealing oral lesion of unclear etiology.  Now status post surgical closure.  6. Asthma  7. CPAP - but not used after procedure per patient    Discussion:  Ms. Ramirez certainly had an unusual presentation, especially in the absence of usual risk factors for subcutaneous emphysema after a procedure (use of air driven tools, severe coughing in the setting of asthma, etc.). The main concern from an infectious diseases standpoint would be emphysema due to a gas forming organism. However, the rapid onset directly after the procedure and significant improvement  today argue against a severe infection. Leukocytosis on admission could be due to steroids reportedly given with procedure. At this point, Unasyn is an appropriate antibiotic choice - even if infection is not the underlying cause of the emphysema, case reports suggest that the process of air entering the tissue space can introduce bacteria.  Given her improvement, I do not anticipate that she will need IV antibiotics upon discharge and can be transitioned to Augmentin at that time.    Recommendations:  1. Continue Unasyn while inpatient  2. Upon discharge, can transition to Augmentin to be continued until facial swelling and pain have resolved -anticipate 7-14 days total antibiotic  duration.  3. Patient should follow-up with an ID provider in approximately 1 month to discuss her ongoing skin infections..  I am very happy to see her and she could also see her previous ID provider Dr. Candelario if that would be more convenient.  We also discussed that she would like to consolidate all of her providers in one healthcare system.  Her current PCP is with [facility] and she could also see ID with them.      Recommendations discussed with primary team    It has been a pleasure to be involved with this patient's care. We will sign off for now, but please feel free to call with additional questions.     Isamar Arambula MD  Infectious Diseases  [phone]         Interval History:   Afebrile overnight.  Swelling is significantly improved.  Tolerating Unasyn without problem.  No new areas of swelling and sensation of upper chest swelling/tightness has resolved.  No shortness of breath.       History of Present Illness:   Ms. Ramirez is a 48-year-old woman who was admitted on 4/4/2018 due to development of first the lateral and then bilateral facial swelling after a biopsy of an oral lesion that was done using a scalpel.  Due to a history of swelling with procedure she was reportedly given Decadron and clindamycin at the time of the procedure.  Despite this, she reported development of facial swelling within several minutes of returning home.  This progressed and she was admitted on the evening of 4/4/2018.  At that point she had a leukocytosis to 20 and a CT showed extensive bilateral facial subcutaneous emphysema.  She was started on Unasyn.  When seen today, she reports that she is feeling overall slightly better than yesterday and does not think that she is having progression of swelling at this point.    She does have a history of facial swelling most notably in 2015 when she had a known tooth abscess with associated mandibular osteomyelitis that was managed by the infectious disease group at [facility].   CTs from that time showed a more typical picture with edema rather than emphysema although per report her swelling was impressive.  She also had an episode of swelling when her nonhealing oral lesion was biopsied in February 2018.  We do not have records of that here which may be due to the fact that she reports it was managed rather easily as an outpatient by the dental team.  She does not remember having a CT with that episode.    Finally, the patient reports a history for the last several years of ongoing groin and breast abscesses.  These temporarily improved with a chlorhexidine bath but is since returned.  She has 1 small growing lesion right now but no other active lesions.  She does have some mild scarring related to these episodes.  She did not have extensive infections as a child.            Review of Systems:   4 point ROS including Respiratory, CV, GI and , other than that noted in the HPI,  is negative           Physical Exam:   Ranges forvital signs:  Temp:  [97.4  F (36.3  C)-98.9  F (37.2  C)] 98.9  F (37.2  C)  Pulse:  [64-82] 82  Heart Rate:  [65] 65  Resp:  [16-18] 18  BP: (156-173)/() 164/80  SpO2:  [92 %-98 %] 92 %    Intake/Output Summary (Last 24 hours) at 04/06/18 0626  Last data filed at 04/05/18 1900   Gross per 24 hour   Intake              450 ml   Output                0 ml   Net              450 ml       Exam:  GENERAL:  well-developed, well-nourished, sitting in bed in no acute distress.   ENT: Significant bilateral facial swelling, significantly improved.  Able to evaluate oral lesions today.  No tongue swelling.  Site of previous lesion is sutured.  No purulent discharge noted.  EYES: Difficult to examine due to swelling.  NECK:  Supple.  LUNGS: Normal respiratory effort.  Not requiring supplemental oxygen.  ABDOMEN: Obese.  Not distended.  EXT: Extremities warm and without edema.  SKIN:  No acute rashes.  Line is in place without any surrounding erythema.  NEUROLOGIC:   Grossly nonfocal.         Laboratory Data:     Creatinine   Date Value Ref Range Status   2018 0.73 0.52 - 1.04 mg/dL Final   2016 0.85 0.52 - 1.04 mg/dL Final   2016 0.62 0.52 - 1.04 mg/dL Final   2016 0.78 0.52 - 1.04 mg/dL Final   2016 0.74 0.52 - 1.04 mg/dL Final     WBC   Date Value Ref Range Status   2018 8.8 4.0 - 11.0 10e9/L Final   2018 17.8 (H) 4.0 - 11.0 10e9/L Final   2018 20.6 (H) 4.0 - 11.0 10e9/L Final   2016 11.0 4.0 - 11.0 10e9/L Final   2016 10.6 4.0 - 11.0 10e9/L Final     Hemoglobin   Date Value Ref Range Status   2018 14.4 11.7 - 15.7 g/dL Final     Platelet Count   Date Value Ref Range Status   2018 270 150 - 450 10e9/L Final     Lab Results   Component Value Date     2018    BUN 13 2018    CO2 29 2018          Pertinent Recent Microbiology Data:   Breast abscess aspirate from  with MSSA and mixed anaerobes         Imagin2018 maxillofacial CT:  IMPRESSION:     1. Extensive subcutaneous emphysema in both sides the face right  greater than left as described. The source of the subcutaneous  emphysema was not determined.    2. Periodontal disease left maxillary molar region where tooth has  been removed. No soft tissue abscess adjacent to this area of  maxillary periodontal disease.  3. No right-sided osteomyelitis identified.   4. Right mandibular focus of osteomyelitis on prior study of /15  has normalized in the interval.

## 2018-04-07 NOTE — PROGRESS NOTES
Progress Note        Zayra Ramirez [[id number]] (F) - 48 year old          Assessment and Plan:      Admitted after diffuse facial subcutaneous emphysema after biopsy of right side of her mouth.     #Facial subcutaneous emphysema, bilateral, right more than left:    No report of dyspnea, no stridor on exam, no wheezing.  No fever chills.  No clear evidence of cellulitis or abscess.   Stable hemodynamics. Oxygenating and breathing fine on room air. Patient non septic.      -Seen by oral surgery and infectious disease  -Continued on Unasyn for three days,  changed to Augmentin at the time of discharge  -Significantly improved    Continue oral oxycodone for the pain relief- increased the dose to every 6h  - Avoid CPAP use for now  - Oxygen via NC as needed is fine       Pulmonary: Current smoker.  History of pulmonary alveolitis, interstitial lung disease, per patient.  Denies COPD diagnoses.  Uses 2-3 L nasal cannula oxygen as needed & sleep at home.  ROBERT on CPAP     -Hold her CPAP for now.  -Continue her prior to admission inhalers  -Oxygen, titrate, to maintain saturation above 90  - pulse Ox  -Encouraged smoking cessation, nicotine patch     Hypertension: uncontrolled   -Continue prior to admission lisinopril hydrochlorothiazide combination  Uncontrolled blood pressure - increased lisinopril -HCTZ to 20/25 mg   -start amlodipine 5 mg today   -Hydralazine as needed for systolic blood pressure more than 160  continue to monitor     Depression, anxiety:   -Continue prior to admission medications  -Stable    Disposition: home tomorrow -pending blood pressure control     Subjective:  Swelling much improved   No fever   Episodes of high blood pressures through out the night, say she was uncomfortably        Ros:  4-point ROS negative except in subjective/interval history.       Objective         Physical Exam:  /85 (BP Location: Right arm)  Pulse 86  Temp 98.6  F (37  C) (Oral)  Resp 14  Wt 111.6 kg (246  lb)  LMP  (LMP Unknown)  SpO2 94%  Breastfeeding? No  BMI 42.23 kg/m2  General: No distress, cooperative, pleasant  HEENT: Bilateral swelling of the face which appears markedly improved, still has puffiness around the eyelids more on the right side, tenderness over right cheek, and also reports some numbness.    Pulmonary: CTAB, normal respiratory effort. No wheezes, rales or rhonchi   Cardiovascular: RRR. S1 and S2 preset. No m/g/r.  Abdomen:BS+, soft, non-tender, non-distended. No guarding or rebound tenderness.   Extremities: Warm and well perfused. No lower extremity edema.  Neuro: Alert and oriented x 3. Moves all extremities symmetrically.     LABS (Last four results)  CMP    Recent Labs  Lab 04/04/18  2147      POTASSIUM 3.6   CHLORIDE 102   CO2 29   ANIONGAP 7   *   BUN 13   CR 0.73   GFRESTIMATED 86   GFRESTBLACK >90   NATALIYA 8.9   PROTTOTAL 7.5   ALBUMIN 3.8   BILITOTAL 0.4   ALKPHOS 100   AST 13   ALT 10     CBC    Recent Labs  Lab 04/07/18  0657 04/06/18  0613 04/05/18  0553 04/04/18  2147   WBC 10.3 8.8 17.8* 20.6*   RBC 5.23* 5.00 4.88 4.95   HGB 15.5 14.4 14.1 14.9   HCT 47.4* 46.0 43.8 44.4   MCV 91 92 90 90   MCH 29.6 28.8 28.9 30.1   MCHC 32.7 31.3* 32.2 33.6   RDW 14.7 14.9 14.6 14.6    270 294 279          Kobi Fisher  Hospitalist   [facility], [place]     4/7/2018

## 2018-04-07 NOTE — PLAN OF CARE
Problem: Patient Care Overview  Goal: Individualization & Mutuality  VS and 02     Pt is alert and oriented x4. BP elevated, c/o headache. prn iV  Hydralazine given, BP still high, moon lighter paged and ordered one time dose of 1o mg  IV  Hydralazine.  Lungs diminished, IS encouraged, sats in the 90s on 2 LPM.Pt denies SOB, chest pain or nausea.    Output:     Voids adequate amount into the toilet    Activity:     Independent    Skin:   Facial swelling    Pain:     Pain is controlled with prn oxycodone during the shift. Pt also has prn Naproxen and tylenol    CMS:     Denies numbness and tingling.   Dressing:     No dressing    Diet:     Regular    LDA:     Right hand PIV,   Equipment:     IV sapphire   Plan:     Continue with IV abx for know    Additional Info:

## 2018-04-07 NOTE — PROVIDER NOTIFICATION
Text page sent to curt: Pt in 824 M,M has elevated BP,bedside nurse said you will put in orders but we're not seeing an order yet.pls advise.ty

## 2018-04-08 VITALS
HEART RATE: 80 BPM | SYSTOLIC BLOOD PRESSURE: 158 MMHG | BODY MASS INDEX: 42.23 KG/M2 | OXYGEN SATURATION: 93 % | RESPIRATION RATE: 16 BRPM | TEMPERATURE: 97.4 F | WEIGHT: 246 LBS | DIASTOLIC BLOOD PRESSURE: 76 MMHG

## 2018-04-08 LAB
ERYTHROCYTE [DISTWIDTH] IN BLOOD BY AUTOMATED COUNT: 14.7 % (ref 10–15)
HCT VFR BLD AUTO: 51.4 % (ref 35–47)
HGB BLD-MCNC: 16.5 G/DL (ref 11.7–15.7)
MCH RBC QN AUTO: 29.1 PG (ref 26.5–33)
MCHC RBC AUTO-ENTMCNC: 32.1 G/DL (ref 31.5–36.5)
MCV RBC AUTO: 91 FL (ref 78–100)
PLATELET # BLD AUTO: 307 10E9/L (ref 150–450)
RBC # BLD AUTO: 5.67 10E12/L (ref 3.8–5.2)
WBC # BLD AUTO: 9.6 10E9/L (ref 4–11)

## 2018-04-08 PROCEDURE — 36415 COLL VENOUS BLD VENIPUNCTURE: CPT | Performed by: INTERNAL MEDICINE

## 2018-04-08 PROCEDURE — 85027 COMPLETE CBC AUTOMATED: CPT | Performed by: INTERNAL MEDICINE

## 2018-04-08 PROCEDURE — 25000128 H RX IP 250 OP 636: Performed by: INTERNAL MEDICINE

## 2018-04-08 PROCEDURE — 99239 HOSP IP/OBS DSCHRG MGMT >30: CPT | Performed by: INTERNAL MEDICINE

## 2018-04-08 PROCEDURE — 25000132 ZZH RX MED GY IP 250 OP 250 PS 637: Performed by: INTERNAL MEDICINE

## 2018-04-08 RX ORDER — OXYCODONE HYDROCHLORIDE 5 MG/1
5 TABLET ORAL EVERY 6 HOURS PRN
Qty: 20 TABLET | Refills: 0 | Status: SHIPPED | OUTPATIENT
Start: 2018-04-08 | End: 2018-05-14

## 2018-04-08 RX ORDER — AMLODIPINE BESYLATE 10 MG/1
10 TABLET ORAL DAILY
Qty: 30 TABLET | Refills: 0 | Status: ON HOLD | OUTPATIENT
Start: 2018-04-09 | End: 2018-10-03

## 2018-04-08 RX ORDER — KETOROLAC TROMETHAMINE 15 MG/ML
15 INJECTION, SOLUTION INTRAMUSCULAR; INTRAVENOUS ONCE
Status: COMPLETED | OUTPATIENT
Start: 2018-04-08 | End: 2018-04-08

## 2018-04-08 RX ORDER — AMOXICILLIN 250 MG
1-2 CAPSULE ORAL 2 TIMES DAILY
Qty: 100 TABLET | Refills: 0 | Status: SHIPPED | OUTPATIENT
Start: 2018-04-08 | End: 2018-04-26

## 2018-04-08 RX ORDER — LISINOPRIL AND HYDROCHLOROTHIAZIDE 20; 25 MG/1; MG/1
1 TABLET ORAL DAILY
Qty: 30 TABLET | Refills: 0 | Status: SHIPPED | OUTPATIENT
Start: 2018-04-09 | End: 2021-01-14

## 2018-04-08 RX ORDER — AMLODIPINE BESYLATE 5 MG/1
10 TABLET ORAL DAILY
Status: DISCONTINUED | OUTPATIENT
Start: 2018-04-09 | End: 2018-04-08 | Stop reason: HOSPADM

## 2018-04-08 RX ADMIN — ACETAMINOPHEN 650 MG: 325 TABLET ORAL at 00:39

## 2018-04-08 RX ADMIN — SENNOSIDES AND DOCUSATE SODIUM 2 TABLET: 8.6; 5 TABLET ORAL at 08:12

## 2018-04-08 RX ADMIN — KETOROLAC TROMETHAMINE 15 MG: 15 INJECTION, SOLUTION INTRAMUSCULAR; INTRAVENOUS at 12:09

## 2018-04-08 RX ADMIN — ARIPIPRAZOLE 10 MG: 10 TABLET ORAL at 08:12

## 2018-04-08 RX ADMIN — LISINOPRIL AND HYDROCHLOROTHIAZIDE 1 TABLET: 25; 20 TABLET ORAL at 08:12

## 2018-04-08 RX ADMIN — DULOXETINE HYDROCHLORIDE 60 MG: 60 CAPSULE, DELAYED RELEASE ORAL at 08:11

## 2018-04-08 RX ADMIN — FLUTICASONE PROPIONATE AND SALMETEROL 1 PUFF: 50; 500 POWDER RESPIRATORY (INHALATION) at 08:17

## 2018-04-08 RX ADMIN — AMLODIPINE BESYLATE 5 MG: 5 TABLET ORAL at 08:11

## 2018-04-08 RX ADMIN — OXYCODONE HYDROCHLORIDE 10 MG: 5 TABLET ORAL at 04:40

## 2018-04-08 RX ADMIN — LAMOTRIGINE 150 MG: 150 TABLET ORAL at 08:11

## 2018-04-08 RX ADMIN — NAPROXEN 500 MG: 500 TABLET ORAL at 10:42

## 2018-04-08 RX ADMIN — OXYCODONE HYDROCHLORIDE 10 MG: 5 TABLET ORAL at 10:42

## 2018-04-08 RX ADMIN — ACETAMINOPHEN 650 MG: 325 TABLET ORAL at 04:40

## 2018-04-08 RX ADMIN — AMOXICILLIN AND CLAVULANATE POTASSIUM 1 TABLET: 875; 125 TABLET, FILM COATED ORAL at 08:11

## 2018-04-08 RX ADMIN — NICOTINE 1 PATCH: 21 PATCH, EXTENDED RELEASE TRANSDERMAL at 08:12

## 2018-04-08 NOTE — DISCHARGE SUMMARY
Discharge Summary    Zayra Ramirez MRN# [id number]   YOB: 1970 Age: 48 year old     Date of Admission:  4/4/2018  Date of Discharge:  4/8/2018  Admitting Physician:  Naman Flores MD  Discharge Physician:  Kobi Fisher MD (Contact: [phone])  Discharging Service:  Internal Medicine     Primary Provider: Preeti Marrufo          Discharge Diagnosis:     Subcutaneous emphysema (H)    Face cellulitis     CPOD    Essential HTN    Depression    Anxiety                    Discharge Disposition:   Discharged to home           Condition on Discharge:   Discharge condition: Stable   Code status on discharge: Full Code           Procedures:   No procedures performed during this admission          Discharge Medications:     Current Discharge Medication List      START taking these medications    Details   amLODIPine (NORVASC) 10 MG tablet Take 1 tablet (10 mg) by mouth daily  Qty: 30 tablet, Refills: 0    Associated Diagnoses: Essential hypertension      amoxicillin-clavulanate (AUGMENTIN) 875-125 MG per tablet Take 1 tablet by mouth 2 times daily  Qty: 10 tablet, Refills: 0    Associated Diagnoses: Subcutaneous emphysema after procedure; Facial cellulitis      senna-docusate (SENOKOT-S;PERICOLACE) 8.6-50 MG per tablet Take 1-2 tablets by mouth 2 times daily  Qty: 100 tablet, Refills: 0    Associated Diagnoses: Drug-induced constipation         CONTINUE these medications which have CHANGED    Details   lisinopril-hydrochlorothiazide (PRINZIDE/ZESTORETIC) 20-25 MG per tablet Take 1 tablet by mouth daily  Qty: 30 tablet, Refills: 0    Associated Diagnoses: Essential hypertension      oxyCODONE IR (ROXICODONE) 5 MG tablet Take 1 tablet (5 mg) by mouth every 6 hours as needed for moderate to severe pain  Qty: 20 tablet, Refills: 0    Associated Diagnoses: Facial cellulitis         CONTINUE these medications which have NOT CHANGED    Details   NAPROXEN PO Take 500 mg by mouth 2 times daily as needed for  moderate pain      DULOXETINE HCL PO Take 120 mg by mouth daily      LAMOTRIGINE PO Take 300 mg by mouth daily      fluticasone-salmeterol (ADVAIR) 500-50 MCG/DOSE diskus inhaler Inhale 1 puff into the lungs every 12 hours      montelukast (SINGULAIR) 10 MG tablet Take 10 mg by mouth At Bedtime      albuterol (PROAIR HFA, PROVENTIL HFA, VENTOLIN HFA) 108 (90 BASE) MCG/ACT inhaler Inhale 2 puffs into the lungs every 6 hours as needed for shortness of breath / dyspnea or wheezing      ARIPiprazole (ABILIFY PO) Take 10 mg by mouth daily.      ROPINIROLE HCL PO Take 2 mg by mouth nightly as needed          STOP taking these medications       METHYLPREDNISOLONE PO Comments:   Reason for Stopping:         ipratropium - albuterol 0.5 mg/2.5 mg/3 mL (DUONEB) 0.5-2.5 (3) MG/3ML nebulization Comments:   Reason for Stopping:         lisinopril-hydrochlorothiazide (PRINZIDE,ZESTORETIC) 20-12.5 MG per tablet Comments:   Reason for Stopping:                     Consultations:   Consultation during this admission received from infectious disease             Brief History of Illness:    Zayra Ramirez is a 48 year old female with a history of morbid obesity, tobacco abuse, pulmonary alveolitis- ILD (per patient, denies COPD), HTN  who presents to the ED  With bilateral facial swelling, acute onset, following biopsy of R inner cheek area. Around 1100. Patient started feeling tightness around her both eyes right more than left starting around 1 PM followed by progressive swelling of both sides of face.Patient had similar problem in 2015 after some dental procedure.          Hospital Course:   Admitted after diffuse facial subcutaneous emphysema after biopsy of right side of her mouth.      #Facial subcutaneous emphysema, bilateral, right more than left:   Patient has similar presentations twice in the past.History of facial swelling due to dental abscess in 2015 - CT showed edema and mandibular osteomyelitis at that time - no  emphysema She was seen by ID. Cultures remain negative.  She never had dyspnea, no stridor on exam, no wheezing.  No fever chills.  No clear evidence of cellulitis or abscess. Oxygenating and breathing fine on room air. Also seen by oral surgery.  She was started on Unasyn with marked improvement in her face swelling over next three days. changed to Augmentin at the time of discharge. She still has pain over rt.cheek, but controled with Po oxycodone. I gave her 5 mg oxycodone ,20 tabs. Advised no to use  CPAP  for now, till completely resolved.  Advised to follow up with PCP in one week and ID , Dr. Candelario ,in one month to discuss her ongoing skin infections.      Pulmonary: Current smoker.  History of pulmonary alveolitis, interstitial lung disease, per patient.  Denies COPD diagnoses.  Uses 2-3 L nasal cannula oxygen as needed & sleep at home.  ROBERT on CPAP .Hold her CPAP for now.  -Continue her prior to admission inhalers\      Hypertension: uncontrolled    prior to admission lisinopril hydrochlorothiazide combination 10-12.5 . Her blood pressure remain uncontrolled through out the hospitalization. I have increased lisinopril -HCTZ to 20/25 mg.   Also started on amlodipine 5 mg and increased to 10.Advised her to follow up with PCP in one week for further monitoring and titration of medications.               Final Day of Progress before Discharge:       Physical Exam:  Blood pressure 158/76, pulse 80, temperature 97.4  F (36.3  C), temperature source Oral, resp. rate 16, weight 111.6 kg (246 lb), SpO2 93 %, not currently breastfeeding.  General: No distress, cooperative, pleasant  HEENT: Bilateral swelling of the face which appears resolved ,nearly normal now   Pulmonary: CTAB, normal respiratory effort. No wheezes, rales or rhonchi   Cardiovascular: RRR. S1 and S2 preset. No m/g/r.  Abdomen:BS+, soft, non-tender, non-distended. No guarding or rebound tenderness.   Extremities: Warm and well perfused. No lower  extremity edema.  Neuro: Alert and oriented x 3. Moves all extremities symmetrically.            Data:  All laboratory data reviewed             Significant Results:   No results found for this or any previous visit (from the past 48 hour(s)).             Pending Results:   Unresulted Labs Ordered in the Past 30 Days of this Admission     No orders found from 2/3/2018 to 4/5/2018.                  Discharge Instructions and Follow-Up:     Discharge Procedure Orders  Reason for your hospital stay   Order Comments: Face cellulitis and emphysema     Adult [facility] Follow-up and recommended labs and tests   Order Comments: Follow up with primary care provider, Preeti Marrufo, within 7 days for hospital follow- up.  No follow up labs or test are needed.  Follow with ID in one month for face cellulitis       Appointments on [place] and/or [facility] (with [facility] or [facility] provider or service). Call [phone] if you haven't heard regarding these appointments within 7 days of discharge.     Activity   Order Comments: Your activity upon discharge: activity as tolerated   Order Specific Question Answer Comments   Is discharge order? Yes      Full Code     Diet   Order Comments: Follow this diet upon discharge: 2g salt   Order Specific Question Answer Comments   Is discharge order? Yes             Attestation:  Kobi Fisher.    Time spent on patient: 35 minutes total including face to face and coordinating care time reviewing current illness, any medication changes, and the care plan for today.

## 2018-04-08 NOTE — PLAN OF CARE
Problem: Skin and Soft Tissue Infection (Adult)  Goal: Signs and Symptoms of Listed Potential Problems Will be Absent, Minimized or Managed (Skin and Soft Tissue Infection)  Signs and symptoms of listed potential problems will be absent, minimized or managed by discharge/transition of care (reference Skin and Soft Tissue Infection (Adult) CPG).   Outcome: Improving    VS: /78, other VSS.   O2: 3L via NC with humidification, cont pulse ox.   Output: Voiding adequate amounts in bathroom independently.   Last BM: 4/4/18, passing flatus.   Activity: Independent.   Skin: Intact, facial edema.   Pain: Throbbing pain in right side of face, dull, aching pain in base of neck, and headache managed with tylenol 650 mg q4h last given at 0440, oxycodone 5-10 mg q6h last given at 0440, and ice.   CMS: Intact.   Dressing: NA.   Diet: Regular.   LDA: PIV right hand SL.   Equipment: NC with humidification, cont pulse ox, pillows, call light within reach.   Plan: Continue to monitor.   Additional Info:

## 2018-04-08 NOTE — PLAN OF CARE
Problem: Skin and Soft Tissue Infection (Adult)  Goal: Signs and Symptoms of Listed Potential Problems Will be Absent, Minimized or Managed (Skin and Soft Tissue Infection)  Signs and symptoms of listed potential problems will be absent, minimized or managed by discharge/transition of care (reference Skin and Soft Tissue Infection (Adult) CPG).   Outcome: Improving  Focus: Discharge   D: Discharging to home ambulatory. PIV removed. Instructed to monitor her blood pressure twice a day. Pain controlled and headache gone. P: Discharge to home accompanied by daughter.

## 2018-04-08 NOTE — PLAN OF CARE
Problem: Skin and Soft Tissue Infection (Adult)  Goal: Signs and Symptoms of Listed Potential Problems Will be Absent, Minimized or Managed (Skin and Soft Tissue Infection)  Signs and symptoms of listed potential problems will be absent, minimized or managed by discharge/transition of care (reference Skin and Soft Tissue Infection (Adult) CPG).   Outcome: Improving    VS:     VSS on 3L O2 with humidification, BP stable this shift, lung have YANCY expiratory wheezes and all other lobes are diminished   Output:     Voids using bathroom independent  BM 4/4 and passing gas   Activity:     UP independent in room   Skin: WDL ex facial edema and redness rt and left side   Pain:     PRN 10mg oxycodone given e6h or as requested  Pt complains of excessive headache especially near temples  PRN naproxen and tylenol given   Ice applied as requested to face  Alternative therapies offered   Neuro/CMS:     Rt face numbness by ear / no tingling   Dressing:     NA   Diet:     Regular diet, tolerated well, no N/V, fair appetite   LDA:     IV Sl and patent   Equipment:     PCD refused, ambulates frequently, O2/humidification,    Plan:     Continue plan of care and infection control.  Monitor BP and O2 stats closely   Additional Info:

## 2018-04-26 ENCOUNTER — HOSPITAL ENCOUNTER (EMERGENCY)
Facility: CLINIC | Age: 48
Discharge: HOME | End: 2018-04-26
Attending: FAMILY MEDICINE | Admitting: FAMILY MEDICINE
Payer: COMMERCIAL

## 2018-04-26 VITALS
BODY MASS INDEX: 41.76 KG/M2 | OXYGEN SATURATION: 89 % | HEART RATE: 88 BPM | WEIGHT: 243.31 LBS | RESPIRATION RATE: 16 BRPM | DIASTOLIC BLOOD PRESSURE: 83 MMHG | SYSTOLIC BLOOD PRESSURE: 141 MMHG | TEMPERATURE: 98.1 F

## 2018-04-26 DIAGNOSIS — R59.1 LYMPHADENOPATHY: ICD-10-CM

## 2018-04-26 LAB
BASOPHILS # BLD AUTO: 0.1 10E9/L (ref 0–0.2)
BASOPHILS NFR BLD AUTO: 0.5 %
DIFFERENTIAL METHOD BLD: NORMAL
EOSINOPHIL # BLD AUTO: 0.4 10E9/L (ref 0–0.7)
EOSINOPHIL NFR BLD AUTO: 3.9 %
ERYTHROCYTE [DISTWIDTH] IN BLOOD BY AUTOMATED COUNT: 14.8 % (ref 10–15)
HCT VFR BLD AUTO: 43.6 % (ref 35–47)
HGB BLD-MCNC: 14.2 G/DL (ref 11.7–15.7)
IMM GRANULOCYTES # BLD: 0 10E9/L (ref 0–0.4)
IMM GRANULOCYTES NFR BLD: 0.2 %
LYMPHOCYTES # BLD AUTO: 2.4 10E9/L (ref 0.8–5.3)
LYMPHOCYTES NFR BLD AUTO: 22.1 %
MCH RBC QN AUTO: 29.3 PG (ref 26.5–33)
MCHC RBC AUTO-ENTMCNC: 32.6 G/DL (ref 31.5–36.5)
MCV RBC AUTO: 90 FL (ref 78–100)
MONOCYTES # BLD AUTO: 0.7 10E9/L (ref 0–1.3)
MONOCYTES NFR BLD AUTO: 6.4 %
NEUTROPHILS # BLD AUTO: 7.3 10E9/L (ref 1.6–8.3)
NEUTROPHILS NFR BLD AUTO: 66.9 %
NRBC # BLD AUTO: 0 10*3/UL
NRBC BLD AUTO-RTO: 0 /100
PLATELET # BLD AUTO: 296 10E9/L (ref 150–450)
RBC # BLD AUTO: 4.84 10E12/L (ref 3.8–5.2)
WBC # BLD AUTO: 10.9 10E9/L (ref 4–11)

## 2018-04-26 PROCEDURE — 85025 COMPLETE CBC W/AUTO DIFF WBC: CPT | Performed by: FAMILY MEDICINE

## 2018-04-26 PROCEDURE — 99283 EMERGENCY DEPT VISIT LOW MDM: CPT | Performed by: FAMILY MEDICINE

## 2018-04-26 PROCEDURE — 25000132 ZZH RX MED GY IP 250 OP 250 PS 637: Performed by: FAMILY MEDICINE

## 2018-04-26 PROCEDURE — 99284 EMERGENCY DEPT VISIT MOD MDM: CPT | Mod: GC | Performed by: FAMILY MEDICINE

## 2018-04-26 RX ORDER — OXYCODONE AND ACETAMINOPHEN 5; 325 MG/1; MG/1
2 TABLET ORAL ONCE
Status: COMPLETED | OUTPATIENT
Start: 2018-04-26 | End: 2018-04-26

## 2018-04-26 RX ORDER — OXYCODONE AND ACETAMINOPHEN 5; 325 MG/1; MG/1
1-2 TABLET ORAL EVERY 4 HOURS PRN
Qty: 12 TABLET | Refills: 0 | Status: SHIPPED | OUTPATIENT
Start: 2018-04-26 | End: 2018-05-14

## 2018-04-26 RX ADMIN — OXYCODONE HYDROCHLORIDE AND ACETAMINOPHEN 2 TABLET: 5; 325 TABLET ORAL at 16:23

## 2018-04-26 NOTE — ED AVS SNAPSHOT
[facility], [place], Emergency Department    [address]    [place] [address]    Phone:  [phone]    Fax:  [phone]                                       Zayra Ramirez   MRN: [id number]    Department:  [facility], Emergency Department   Date of Visit:  4/26/2018           After Visit Summary Signature Page     I have received my discharge instructions, and my questions have been answered. I have discussed any challenges I see with this plan with the nurse or doctor.    ..........................................................................................................................................  Patient/Patient Representative Signature      ..........................................................................................................................................  Patient Representative Print Name and Relationship to Patient    ..................................................               ................................................  Date                                            Time    ..........................................................................................................................................  Reviewed by Signature/Title    ...................................................              ..............................................  Date                                                            Time

## 2018-04-26 NOTE — ED AVS SNAPSHOT
[facility], Emergency Department    [address]    [place] [address]    Phone:  [phone]    Fax:  [phone]                                       Zayra Ramirez   MRN: [id number]    Department:  [facility], Emergency Department   Date of Visit:  4/26/2018           Patient Information     Date Of Birth          1970        Your diagnoses for this visit were:     Lymphadenopathy, right anterior neck node        You were seen by Mikal Xavier MD.        Discharge Instructions       Gentle warm packs  For the pain, naprosyn and if needed percocet  If fevers, chills, nausea and vomiting or body aches recheck immediately- back to the emergency room- day or night!  Continue the augmentin for 2 weeks  Follow up with oral surgery next week if all goes well- make appointment to follow up  You tell me the area is less swollen today- hopefully this continues rapidly    Your next 10 appointments already scheduled     May 24, 2018  9:30 AM CDT   (Arrive by 9:15 AM)   Return Visit with Isamar Arambula MD   [facility] ([facility])    [address] [place] [address]  [address] [place] [id number] [phone]              24 Hour Appointment Hotline       To make an appointment at any [facility], call [phone] ([phone]). If you don't have a family doctor or clinic, we will help you find one. [place] clinics are conveniently located to serve the needs of you and your family.             Review of your medicines      START taking        Dose / Directions Last dose taken    oxyCODONE-acetaminophen 5-325 MG per tablet   Commonly known as:  PERCOCET   Dose:  1-2 tablet   Quantity:  12 tablet        Take 1-2 tablets by mouth every 4 hours as needed for pain   Refills:  0          Our records show that you are taking the medicines listed below. If these are incorrect, please call your family doctor or clinic.        Dose /  Directions Last dose taken    ABILIFY PO   Dose:  10 mg        Take 10 mg by mouth daily.   Refills:  0        albuterol 108 (90 Base) MCG/ACT Inhaler   Commonly known as:  PROAIR HFA/PROVENTIL HFA/VENTOLIN HFA   Dose:  2 puff        Inhale 2 puffs into the lungs every 6 hours as needed for shortness of breath / dyspnea or wheezing   Refills:  0        amLODIPine 10 MG tablet   Commonly known as:  NORVASC   Dose:  10 mg   Quantity:  30 tablet        Take 1 tablet (10 mg) by mouth daily   Refills:  0        amoxicillin-clavulanate 875-125 MG per tablet   Commonly known as:  AUGMENTIN   Dose:  1 tablet   Indication:  Infection of the Skin and/or Related Soft Tissue   Quantity:  10 tablet        Take 1 tablet by mouth 2 times daily   Refills:  0        DULOXETINE HCL PO   Dose:  120 mg        Take 120 mg by mouth daily   Refills:  0        fluticasone-salmeterol 500-50 MCG/DOSE diskus inhaler   Commonly known as:  ADVAIR   Dose:  1 puff        Inhale 1 puff into the lungs every 12 hours   Refills:  0        LAMOTRIGINE PO   Dose:  300 mg        Take 300 mg by mouth daily   Refills:  0        lisinopril-hydrochlorothiazide 20-25 MG per tablet   Commonly known as:  PRINZIDE/ZESTORETIC   Dose:  1 tablet   Quantity:  30 tablet        Take 1 tablet by mouth daily   Refills:  0        NAPROXEN PO   Dose:  500 mg        Take 500 mg by mouth 2 times daily as needed for moderate pain   Refills:  0        oxyCODONE IR 5 MG tablet   Commonly known as:  ROXICODONE   Dose:  5 mg   Quantity:  20 tablet        Take 1 tablet (5 mg) by mouth every 6 hours as needed for moderate to severe pain   Refills:  0        ROPINIROLE HCL PO   Dose:  2 mg        Take 2 mg by mouth nightly as needed   Refills:  0        SINGULAIR 10 MG tablet   Dose:  10 mg   Generic drug:  montelukast        Take 10 mg by mouth At Bedtime   Refills:  0                Information about OPIOIDS     PRESCRIPTION OPIOIDS: WHAT YOU NEED TO KNOW   You have a  prescription for an opioid (narcotic) pain medicine. Opioids can cause addiction. If you have a history of chemical dependency of any type, you are at a higher risk of becoming addicted to opioids. Only take this medicine after all other options have been tried. Take it for as short a time and as few doses as possible.     Do not:    Drive. If you drive while taking these medicines, you could be arrested for driving under the influence (DUI).    Operate heavy machinery    Do any other dangerous activities while taking these medicines.     Drink any alcohol while taking these medicines.      Take with any other medicines that contain acetaminophen. Read all labels carefully. Look for the word  acetaminophen  or  Tylenol.  Ask your pharmacist if you have questions or are unsure.    Store your pills in a secure place, locked if possible. We will not replace any lost or stolen medicine. If you don t finish your medicine, please throw away (dispose) as directed by your pharmacist. The [place] [employer]ution [employer] has more information about safe disposal: https://www.pca.[facility].[place].us/living-green/managing-unwanted-medications    All opioids tend to cause constipation. Drink plenty of water and eat foods that have a lot of fiber, such as fruits, vegetables, prune juice, apple juice and high-fiber cereal. Take a laxative (Miralax, milk of magnesia, Colace, Senna) if you don t move your bowels at least every other day.         Prescriptions were sent or printed at these locations (1 Prescription)                   Other Prescriptions                Printed at Department/Unit printer (1 of 1)         oxyCODONE-acetaminophen (PERCOCET) 5-325 MG per tablet                Procedures and tests performed during your visit     CBC with platelets differential      Orders Needing Specimen Collection     None      Pending Results     No orders found from 4/24/2018 to 4/27/2018.            Pending Culture Results     No orders  "found from 2018 to 2018.            Pending Results Instructions     If you had any lab results that were not finalized at the time of your Discharge, you can call the ED Lab Result RN at [phone]. You will be contacted by this team for any positive Lab results or changes in treatment. The nurses are available 7 days a week from 10A to 6:30P.  You can leave a message 24 hours per day and they will return your call.        Thank you for choosing [facility]       Thank you for choosing [facility] for your care. Our goal is always to provide you with excellent care. Hearing back from our patients is one way we can continue to improve our services. Please take a few minutes to complete the written survey that you may receive in the mail after you visit with us. Thank you!        [employer] Information     "[employer] lets you send messages to your doctor, view your test results, renew your prescriptions, schedule appointments and more. To sign up, go to [email or web address] . Click on \"Log in\" on the left side of the screen, which will take you to the Welcome page. Then click on \"Sign up Now\" on the right side of the page.     You will be asked to enter the access code listed below, as well as some personal information. Please follow the directions to create your username and password.     Your access code is: [email or web address]  Expires: 2018  1:19 PM     Your access code will  in 90 days. If you need help or a new code, please call your [facility] clinic or [phone].        Care EveryWhere ID     This is your Care EveryWhere ID. This could be used by other organizations to access your [facility] medical records  [id number]        Equal Access to Services     BELTRAN MIRELES : Hadii jomar House, yvrose dominguez, nicholas dow. So [facility] [phone].    ATENCIÓN: Si habla español, tiene a diaz disposición servicios gratuitos de asistencia " luis [place] al [phone].    We comply with applicable federal civil rights laws and [place] laws. We do not discriminate on the basis of race, color, national origin, age, disability, sex, sexual orientation, or gender identity.            After Visit Summary       This is your record. Keep this with you and show to your community pharmacist(s) and doctor(s) at your next visit.

## 2018-04-26 NOTE — DISCHARGE INSTRUCTIONS
Gentle warm packs  For the pain, naprosyn and if needed percocet  If fevers, chills, nausea and vomiting or body aches recheck immediately- back to the emergency room- day or night!  Continue the augmentin for 2 weeks  Follow up with oral surgery next week if all goes well- make appointment to follow up  You tell me the area is less swollen today- hopefully this continues rapidly

## 2018-04-26 NOTE — ED PROVIDER NOTES
"    [facility] EMERGENCY DEPARTMENT ([facility])    4/26/18       History     Chief Complaint   Patient presents with     Pain behind right ear @mastoid bone.     Pt was seen at her MD and told it was not her right ear.  States she had low grade fever @ MD on Tuesday.     HPI  Zayra Ramirez is a 48 year old female with a medical history significant for tobacco abuse, pulmonary alveolitis-ILD and hypertension.  Per review of patient's chart, patient was recently hospitalized here from 4/4/2018 to 4/8/2018 for a subcutaneous emphysema from cpap machine and facial cellulitis.  She has a right buccal lesion that had been biopsied and then infected. Patient was started on a 5 day course of Augmentin at discharge FOLLOWING IV ANTIBIOTICS.  The patient presents to the Emergency Department for evaluation of pain that starts posterior to her right ear and goes down into the right side of her jaw.  She states that when she was discharged from the hospital, her pain had improved and her swelling had gone down.  On 4/24/2018 she began having pain in her right ear, she thought she had an ear infection.  She was seen by her PCP at that time and her PCP believed that her pain and swelling was related to her cellulitis.  The patient's PCP put the patient on Augmentin and told her to take this for 24 hours, for symptoms did not improve to go to the Emergency Department.  Patient presents here as her pain has been worsening over the past 24 hours. Although the swelling is \"better\".    No fevers or chills. No myalgias. No n or v. No purulent drainage    I have reviewed the Medications, Allergies, Past Medical and Surgical History, and Social History in the [employer] system.    Past Medical History:   Diagnosis Date     Asthma     copd     COPD (chronic obstructive pulmonary disease) (H)      Dental abscess 8-2015     Morbid obesity with BMI of 40.0-44.9, adult (H)    borderline tendencies with bipolar disorder    Past Surgical " History:   Procedure Laterality Date     COLONOSCOPY       ENT SURGERY       HC DRAIN SKIN ABSCESS SIMPLE/SINGLE  3/16/2012    Procedure:INCISION AND DRAINAGE, ABSCESS, SIMPLE; Surgeon:CHRISTIANO HANCOCK; Location:[facility]     HYSTERECTOMY       INCISION AND DRAINAGE ABDOMEN WASHOUT, COMBINED         No family history on file.    Social History   Substance Use Topics     Smoking status: Current Every Day Smoker     Packs/day: 1.00     Types: Cigarettes     Smokeless tobacco: Never Used     Alcohol use No       No current facility-administered medications for this encounter.      Current Outpatient Prescriptions   Medication     albuterol (PROAIR HFA, PROVENTIL HFA, VENTOLIN HFA) 108 (90 BASE) MCG/ACT inhaler     amLODIPine (NORVASC) 10 MG tablet     amoxicillin-clavulanate (AUGMENTIN) 875-125 MG per tablet     ARIPiprazole (ABILIFY PO)     DULOXETINE HCL PO     fluticasone-salmeterol (ADVAIR) 500-50 MCG/DOSE diskus inhaler     LAMOTRIGINE PO     lisinopril-hydrochlorothiazide (PRINZIDE/ZESTORETIC) 20-25 MG per tablet     montelukast (SINGULAIR) 10 MG tablet     NAPROXEN PO     oxyCODONE IR (ROXICODONE) 5 MG tablet     oxyCODONE-acetaminophen (PERCOCET) 5-325 MG per tablet     ROPINIROLE HCL PO     Facility-Administered Medications Ordered in Other Encounters   Medication     Lidocaine 1 % injection 0.5-5 mL     sodium chloride (PF) 0.9% PF flush 5-50 mL        Allergies   Allergen Reactions     Bees      Doxycycline Anaphylaxis     Erythromycin Anaphylaxis         Review of Systems   Constitutional: Negative for chills, fatigue and fever.   HENT: Positive for ear pain and facial swelling. Negative for congestion, sore throat and trouble swallowing.         Positive for pain posterior to the right ear with radiation into the right jaw  Positive for swelling posterior to the right ear and into the right jaw   Respiratory: Negative for cough.    Gastrointestinal: Negative for nausea and vomiting.   Genitourinary:  Negative for dysuria.   Musculoskeletal: Negative for myalgias.   Allergic/Immunologic: Negative for immunocompromised state.   Hematological: Negative.    Psychiatric/Behavioral: Negative for hallucinations, self-injury and suicidal ideas.   All other systems reviewed and are negative.      Physical Exam   BP: 121/73  Pulse: 85  Temp: 98.5  F (36.9  C)  Resp: 16  Weight: 110.4 kg (243 lb 5 oz)  SpO2: 91 % (Smokes 1/4 to 1 PPD)      Physical Exam   Constitutional: She is oriented to person, place, and time. No distress.   Smells strongly of tobacco  No distress and not toxic   HENT:   Head: Normocephalic and atraumatic.   There is a healing lesion on the right buccal mucosa.  There is a large lymph node tender at the angle of the right mandible. There is also smaller nodes on the right.    TMs are clear. Pharynx is benign   Eyes: Pupils are equal, round, and reactive to light.   Neck: Normal range of motion. Neck supple.   Cardiovascular: Normal rate, regular rhythm and normal heart sounds.    Lymphadenopathy:     She has cervical adenopathy.   Neurological: She is alert and oriented to person, place, and time.   Skin: Skin is warm and dry. She is not diaphoretic.   Psychiatric: She has a normal mood and affect.   Nursing note and vitals reviewed.      ED Course   3:41 PM  The patient was seen and examined by Mikal Xavier MD in Room ED19.     ED Course     Procedures        WBC is normal  Labs Ordered and Resulted from Time of ED Arrival Up to the Time of Departure from the ED   CBC WITH PLATELETS DIFFERENTIAL            Assessments & Plan (with Medical Decision Making)   The pt has likely recurrent infection from the buccal lesion or dental.  She is not toxic and reports that there is less swelling after 2 days of augmentin  She does not need admission at this point  Continue augmentin for a full 2 weeks  Recheck if worse  The pt got 2 percocet with great improvement in pain  Continue naprosyn  Small number of  percocet for home    I have reviewed the nursing notes.    I have reviewed the findings, diagnosis, plan and need for follow up with the patient.    Discharge Medication List as of 4/26/2018  5:14 PM      START taking these medications    Details   oxyCODONE-acetaminophen (PERCOCET) 5-325 MG per tablet Take 1-2 tablets by mouth every 4 hours as needed for pain, Disp-12 tablet, R-0, Local Print             Final diagnoses:   Lymphadenopathy, right anterior neck node     IBrady, am serving as a trained medical scribe to document services personally performed by Mikal Xavier MD, based on the provider's statements to me.   I, Mikal Xavier MD, was physically present and have reviewed and verified the accuracy of this note documented by Brady Griffin.    4/26/2018   [facility], [place], EMERGENCY DEPARTMENT     Mikal Xavier MD  04/27/18 0146

## 2018-04-27 ASSESSMENT — ENCOUNTER SYMPTOMS
FATIGUE: 0
DYSURIA: 0
NAUSEA: 0
TROUBLE SWALLOWING: 0
CHILLS: 0
COUGH: 0
VOMITING: 0
HEMATOLOGIC/LYMPHATIC NEGATIVE: 1
SORE THROAT: 0
FACIAL SWELLING: 1
MYALGIAS: 0
FEVER: 0
HALLUCINATIONS: 0

## 2018-05-02 DIAGNOSIS — R59.1 LYMPHADENOPATHY: Primary | ICD-10-CM

## 2018-05-10 NOTE — TELEPHONE ENCOUNTER
FUTURE VISIT INFORMATION      FUTURE VISIT INFORMATION:    Date: 5/14/18    Time: 8:30AM    Location: [facility]  REFERRAL INFORMATION:    Referring provider:  Dr Carson Dixon    Referring providers clinic:  [facility] Oral Surgery    Reason for visit/diagnosis  enlarged Lymph Nodes    RECORDS REQUESTED FROM:       Clinic name Comments Records Status Imaging Status   [facility] Oral Surgery 4/4/18 Right Cheek Bx report (fax for slides sent 5/9/18)  2/19/18 right check Bx report  2/23/17 Right Buccal Mucosa Report Sent to Scan    [facility] Oral Surgery 5/2/18, 4/4/18, 3/14/18, 2/27/18, 2/20/18, 4/24/17, 3/22/17, 2/23/17 visit notes with Dr Dixon Sent to scan                               RECORDS STATUS    5/11/18 12:12PM called Oral Pathology to see if they have processed the fax from 5/9/18 to send to Surgical Path - Amay    5/11/18 2:27PM Lucie from Oral [facility] lvm on my phone stating they will work on the slides now and send to surgical path, hopefully, before the end of the day -  Amay

## 2018-05-14 ENCOUNTER — OFFICE VISIT (OUTPATIENT)
Dept: OTOLARYNGOLOGY | Facility: CLINIC | Age: 48
End: 2018-05-14
Payer: COMMERCIAL

## 2018-05-14 ENCOUNTER — PRE VISIT (OUTPATIENT)
Dept: OTOLARYNGOLOGY | Facility: CLINIC | Age: 48
End: 2018-05-14

## 2018-05-14 VITALS — WEIGHT: 240 LBS | BODY MASS INDEX: 41.2 KG/M2

## 2018-05-14 DIAGNOSIS — K13.70 ORAL LESION: Primary | ICD-10-CM

## 2018-05-14 PROBLEM — G99.2 STENOSIS OF CERVICAL SPINE WITH MYELOPATHY (H): Status: ACTIVE | Noted: 2017-10-13

## 2018-05-14 PROBLEM — M48.02 STENOSIS OF CERVICAL SPINE WITH MYELOPATHY (H): Status: ACTIVE | Noted: 2017-10-13

## 2018-05-14 LAB — COPATH REPORT: NORMAL

## 2018-05-14 PROCEDURE — 00000346 ZZHCL STATISTIC REVIEW OUTSIDE SLIDES TC 88321: Performed by: OTOLARYNGOLOGY

## 2018-05-14 ASSESSMENT — PAIN SCALES - GENERAL: PAINLEVEL: NO PAIN (0)

## 2018-05-14 NOTE — PROGRESS NOTES
Thank you for requesting consultation on Zayra Ramirez.      HISTORY OF PRESENT ILLNESS:  As you know, she is a 48-year-old woman who has had history of oral ulcers as well as some questionable neck lymphadenopathy.  She had a recent CT scan that also showed some air in her neck and around her parotid gland.  Etiology of this is unclear although she has not had any evidence of obvious gas forming infection.  She notes that over the last few times that she has been biopsied for this ulcer in the right buccal mucosa, she has had swelling on that side of her face.  She is unsure if this could be related to an allergy to the lidocaine jelly that she had used at the time.  She reports that when Dr. Dixon treated this, he did take her to the procedure room and excise the area entirely.  This was about two or three weeks ago.  She feels that it is getting a little bit better, but in the interim she has noticed a new lesion on her left buccal mucosa.  There is also a small one on her tongue that may be related to dental trauma.  She also notes that she had a lymph node in the right level II area that was quite large for some time, but it has come down in size.  At the time that it was enlarged, it was quite painful.  It is still a little bit sore but much better now than it was.  She denies fevers, chills or sweats.  She denies odynophagia, dysphagia, hoarseness or otalgia.         Past Medical History:   Diagnosis Date     Asthma     copd     COPD (chronic obstructive pulmonary disease) (H)      Dental abscess 8-2015     Morbid obesity with BMI of 40.0-44.9, adult (H)      Past Surgical History:   Procedure Laterality Date     COLONOSCOPY       ENT SURGERY       HC DRAIN SKIN ABSCESS SIMPLE/SINGLE  3/16/2012    Procedure:INCISION AND DRAINAGE, ABSCESS, SIMPLE; Surgeon:CHRISTIANO HANCOCK; Location:[facility]     HYSTERECTOMY       INCISION AND DRAINAGE ABDOMEN WASHOUT, COMBINED         Current Outpatient Prescriptions:       albuterol (PROAIR HFA, PROVENTIL HFA, VENTOLIN HFA) 108 (90 BASE) MCG/ACT inhaler, Inhale 2 puffs into the lungs every 6 hours as needed for shortness of breath / dyspnea or wheezing, Disp: , Rfl:      amLODIPine (NORVASC) 10 MG tablet, Take 1 tablet (10 mg) by mouth daily, Disp: 30 tablet, Rfl: 0     amoxicillin-clavulanate (AUGMENTIN) 875-125 MG per tablet, Take 1 tablet by mouth 2 times daily, Disp: 10 tablet, Rfl: 0     ARIPiprazole (ABILIFY PO), Take 10 mg by mouth daily., Disp: , Rfl:      DULOXETINE HCL PO, Take 120 mg by mouth daily, Disp: , Rfl:      fluticasone-salmeterol (ADVAIR) 500-50 MCG/DOSE diskus inhaler, Inhale 1 puff into the lungs every 12 hours, Disp: , Rfl:      LAMOTRIGINE PO, Take 300 mg by mouth daily, Disp: , Rfl:      lisinopril-hydrochlorothiazide (PRINZIDE/ZESTORETIC) 20-25 MG per tablet, Take 1 tablet by mouth daily, Disp: 30 tablet, Rfl: 0     montelukast (SINGULAIR) 10 MG tablet, Take 10 mg by mouth At Bedtime, Disp: , Rfl:      NAPROXEN PO, Take 500 mg by mouth 2 times daily as needed for moderate pain, Disp: , Rfl:      ROPINIROLE HCL PO, Take 2 mg by mouth nightly as needed , Disp: , Rfl:   No current facility-administered medications for this visit.     Facility-Administered Medications Ordered in Other Visits:      Lidocaine 1 % injection 0.5-5 mL, 0.5-5 mL, Other, Once PRN, Gutierrez Candelario MD     sodium chloride (PF) 0.9% PF flush 5-50 mL, 5-50 mL, Intracatheter, Once PRN, Gutierrez Candelario MD  Allergies   Allergen Reactions     Bee Venom Anaphylaxis     Bees      Doxycycline Anaphylaxis     Erythromycin Anaphylaxis and Shortness Of Breath     Other reaction(s): Vomiting     Social History   Substance Use Topics     Smoking status: Current Every Day Smoker     Packs/day: 1.00     Types: Cigarettes     Smokeless tobacco: Never Used     Alcohol use No     Review Of Systems  Skin: negative  Eyes: negative  Ears/Nose/Throat: negative  Respiratory: No shortness of breath, dyspnea on  exertion, cough, or hemoptysis  Cardiovascular: negative  Gastrointestinal: negative  Genitourinary: negative  Musculoskeletal: negative  Neurologic: negative  Psychiatric: negative  Hematologic/Lymphatic/Immunologic: negative  Endocrine: negative    PHYSICAL EXAMINATION:  She is well-appearing, in no distress.  Examination of the oral cavity reveals a healing area in the right upper buccal mucosa just under the molars.  The tongue appears normal with the exception of a small ulcerated area in the left lateral tongue.  This looks like dental trauma to me.  On the left posterior buccal mucosa, there is an approximately 1 cm roundish mucosal abnormality that is somewhat depressed.  When I palpate it, it has some thickness to it and is quite tender.  It does not appear like a typical aphthous ulcer but is not overtly concerning for carcinoma either.  The remainder of the oral cavity is unremarkable.  Examination of her neck reveals diffuse fullness but no evidence of discrete lymphadenopathy that I can palpate today.      IMAGING:  The patient has a CT scan that I reviewed independently.  It does show air around both parotid glands and in the parapharyngeal space as well as down into the neck a little bit.  She has some diffuse lymph nodes, but the scans were done without contrast.      IMPRESSION:  Recurrent oral ulcers, recent CT scan showing gas in the neck and parotids.      PLAN:   1.  It is not clear to me how the gas got into the neck.  It could be retrograde air tracking if she is auto-insufflating, but she does not look acutely toxic such that I would be concerned about a gas forming infection.   2.  I would like to biopsy the left oral ulcer.  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   3.  If this is negative, I would like her to see Dr. Arturo Blackburn to see if he has any suggestions for treating these recurrent oral ulcers.       PROCEDURE NOTE:  Biopsy of left oral cavity.  After injecting 1% lidocaine for a total of 3 cc, I used a 3 mm punch biopsy to take a biopsy of the left buccal ulcer.  This was cauterized using silver nitrate.  The patient tolerated it well.

## 2018-05-14 NOTE — PATIENT INSTRUCTIONS
Biopsy done today   You will be called with the results  An appointment with Dr. Arturo Blackburn to be arranged  Call me if ?'s arise [phone]  Thea Martinez RN

## 2018-05-14 NOTE — LETTER
5/14/2018       RE: Zayra Ramirez  [address] [place] [address]     Dear Colleague,    Thank you for referring your patient, Zayra Ramirez, to the [facility] EAR NOSE AND THROAT at [facility]. Please see a copy of my visit note below.    Thank you for requesting consultation on Zayra Ramirez.      HISTORY OF PRESENT ILLNESS:  As you know, she is a 48-year-old woman who has had history of oral ulcers as well as some questionable neck lymphadenopathy.  She had a recent CT scan that also showed some air in her neck and around her parotid gland.  Etiology of this is unclear although she has not had any evidence of obvious gas forming infection.  She notes that over the last few times that she has been biopsied for this ulcer in the right buccal mucosa, she has had swelling on that side of her face.  She is unsure if this could be related to an allergy to the lidocaine jelly that she had used at the time.  She reports that when Dr. Dixon treated this, he did take her to the procedure room and excise the area entirely.  This was about two or three weeks ago.  She feels that it is getting a little bit better, but in the interim she has noticed a new lesion on her left buccal mucosa.  There is also a small one on her tongue that may be related to dental trauma.  She also notes that she had a lymph node in the right level II area that was quite large for some time, but it has come down in size.  At the time that it was enlarged, it was quite painful.  It is still a little bit sore but much better now than it was.  She denies fevers, chills or sweats.  She denies odynophagia, dysphagia, hoarseness or otalgia.         Past Medical History:   Diagnosis Date     Asthma     copd     COPD (chronic obstructive pulmonary disease) (H)      Dental abscess 8-2015     Morbid obesity with BMI of 40.0-44.9, adult (H)      Past Surgical History:   Procedure Laterality Date      COLONOSCOPY       ENT SURGERY       HC DRAIN SKIN ABSCESS SIMPLE/SINGLE  3/16/2012    Procedure:INCISION AND DRAINAGE, ABSCESS, SIMPLE; Surgeon:CHRISTIANO HANCOCK; Location:[facility]     HYSTERECTOMY       INCISION AND DRAINAGE ABDOMEN WASHOUT, COMBINED         Current Outpatient Prescriptions:      albuterol (PROAIR HFA, PROVENTIL HFA, VENTOLIN HFA) 108 (90 BASE) MCG/ACT inhaler, Inhale 2 puffs into the lungs every 6 hours as needed for shortness of breath / dyspnea or wheezing, Disp: , Rfl:      amLODIPine (NORVASC) 10 MG tablet, Take 1 tablet (10 mg) by mouth daily, Disp: 30 tablet, Rfl: 0     amoxicillin-clavulanate (AUGMENTIN) 875-125 MG per tablet, Take 1 tablet by mouth 2 times daily, Disp: 10 tablet, Rfl: 0     ARIPiprazole (ABILIFY PO), Take 10 mg by mouth daily., Disp: , Rfl:      DULOXETINE HCL PO, Take 120 mg by mouth daily, Disp: , Rfl:      fluticasone-salmeterol (ADVAIR) 500-50 MCG/DOSE diskus inhaler, Inhale 1 puff into the lungs every 12 hours, Disp: , Rfl:      LAMOTRIGINE PO, Take 300 mg by mouth daily, Disp: , Rfl:      lisinopril-hydrochlorothiazide (PRINZIDE/ZESTORETIC) 20-25 MG per tablet, Take 1 tablet by mouth daily, Disp: 30 tablet, Rfl: 0     montelukast (SINGULAIR) 10 MG tablet, Take 10 mg by mouth At Bedtime, Disp: , Rfl:      NAPROXEN PO, Take 500 mg by mouth 2 times daily as needed for moderate pain, Disp: , Rfl:      ROPINIROLE HCL PO, Take 2 mg by mouth nightly as needed , Disp: , Rfl:   No current facility-administered medications for this visit.     Facility-Administered Medications Ordered in Other Visits:      Lidocaine 1 % injection 0.5-5 mL, 0.5-5 mL, Other, Once PRN, Gutierrez Candelario MD     sodium chloride (PF) 0.9% PF flush 5-50 mL, 5-50 mL, Intracatheter, Once PRN, Gutierrez Candelario MD  Allergies   Allergen Reactions     Bee Venom Anaphylaxis     Bees      Doxycycline Anaphylaxis     Erythromycin Anaphylaxis and Shortness Of Breath     Other reaction(s): Vomiting     Social  History   Substance Use Topics     Smoking status: Current Every Day Smoker     Packs/day: 1.00     Types: Cigarettes     Smokeless tobacco: Never Used     Alcohol use No     Review Of Systems  Skin: negative  Eyes: negative  Ears/Nose/Throat: negative  Respiratory: No shortness of breath, dyspnea on exertion, cough, or hemoptysis  Cardiovascular: negative  Gastrointestinal: negative  Genitourinary: negative  Musculoskeletal: negative  Neurologic: negative  Psychiatric: negative  Hematologic/Lymphatic/Immunologic: negative  Endocrine: negative    PHYSICAL EXAMINATION:  She is well-appearing, in no distress.  Examination of the oral cavity reveals a healing area in the right upper buccal mucosa just under the molars.  The tongue appears normal with the exception of a small ulcerated area in the left lateral tongue.  This looks like dental trauma to me.  On the left posterior buccal mucosa, there is an approximately 1 cm roundish mucosal abnormality that is somewhat depressed.  When I palpate it, it has some thickness to it and is quite tender.  It does not appear like a typical aphthous ulcer but is not overtly concerning for carcinoma either.  The remainder of the oral cavity is unremarkable.  Examination of her neck reveals diffuse fullness but no evidence of discrete lymphadenopathy that I can palpate today.      IMAGING:  The patient has a CT scan that I reviewed independently.  It does show air around both parotid glands and in the parapharyngeal space as well as down into the neck a little bit.  She has some diffuse lymph nodes, but the scans were done without contrast.      IMPRESSION:  Recurrent oral ulcers, recent CT scan showing gas in the neck and parotids.      PLAN:   1.  It is not clear to me how the gas got into the neck.  It could be retrograde air tracking if she is auto-insufflating, but she does not look acutely toxic such that I would be concerned about a gas forming infection.   2.  I would like  to biopsy the left oral ulcer.  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   3.  If this is negative, I would like her to see Dr. Arturo Blacbkurn to see if he has any suggestions for treating these recurrent oral ulcers.      PROCEDURE NOTE:  Biopsy of left oral cavity.  After injecting 1% lidocaine for a total of 3 cc, I used a 3 mm punch biopsy to take a biopsy of the left buccal ulcer.  This was cauterized using silver nitrate.  The patient tolerated it well.           Again, thank you for allowing me to participate in the care of your patient.      Sincerely,    Rajat Johnson MD

## 2018-05-14 NOTE — NURSING NOTE
Chief Complaint   Patient presents with     Consult     enlarged lymph nodes     Weight 108.9 kg (240 lb), not currently breastfeeding.    Silvano Munroe LPN

## 2018-05-14 NOTE — MR AVS SNAPSHOT
After Visit Summary   5/14/2018    Zayra Ramirez    MRN: [id number]           Patient Information     Date Of Birth          1970        Visit Information        Provider Department      5/14/2018 8:30 AM Rajat Johnson MD [facility] Ear Nose and Throat        Today's Diagnoses     Oral lesion    -  1      Care Instructions    Biopsy done today   You will be called with the results  An appointment with Dr. Arturo Blackburn to be arranged  Call me if ?'s arise [phone]  Thea Martinez RN          Follow-ups after your visit        Your next 10 appointments already scheduled     May 24, 2018  9:30 AM CDT   (Arrive by 9:15 AM)   Return Visit with Isamar Arambula MD   [facility] and Infectious [facility] ([facility])    [address] [place] [address]  [address]  [facility] [address] [phone]            Jun 26, 2018  1:00 PM CDT   (Arrive by 12:45 PM)   New Patient Visit with Morro Mijares MD   [facility] Ear Nose and Throat ([facility])    [address] [place] [address]  4th Floor  [facility] [address] [phone]              Who to contact     Please call your clinic at [phone] to:    Ask questions about your health    Make or cancel appointments    Discuss your medicines    Learn about your test results    Speak to your doctor            Additional Information About Your Visit        MyChart Information     [employer] is an electronic gateway that provides easy, online access to your medical records. With [employer], you can request a clinic appointment, read your test results, renew a prescription or communicate with your care team.     To sign up for [employer] visit the website at [email or web address]   You will be asked to enter the access code listed below, as well as some personal information. Please follow the directions to create your username and password.     Your access code is: [email or web address]  Expires: 7/7/2018  1:19  PM     Your access code will  in 90 days. If you need help or a new code, please contact your [facility] or call [phone] for assistance.        Care EveryWhere ID     This is your Care EveryWhere ID. This could be used by other organizations to access your [facility] medical records  [id number]        Your Vitals Were     Last Period BMI (Body Mass Index)                (LMP Unknown) 41.2 kg/m2           Blood Pressure from Last 3 Encounters:   18 141/83   18 158/76   16 148/90    Weight from Last 3 Encounters:   18 108.9 kg (240 lb)   18 110.4 kg (243 lb 5 oz)   18 111.6 kg (246 lb)              We Performed the Following     BIOPSY OF MOUTH LESION     Surgical pathology exam        Primary Care Provider Office Phone # Fax #    Preeti Marrufo PA-C [phone]       [facility] [address]  [facility] [address]        Equal Access to Services     BELTRAN MIRELES : Hadii aad ku hadasho Soomaali, waaxda luqadaha, qaybta kaalmada adeegyada, waxay idiin hayaan lázaro duran . So [facility] [phone].    ATENCIÓN: Si habla español, tiene a diaz disposición servicios gratuitos de asistencia lingüística. [facility] [phone].    We comply with applicable federal civil rights laws and [place] laws. We do not discriminate on the basis of race, color, national origin, age, disability, sex, sexual orientation, or gender identity.            Thank you!     Thank you for choosing [facility] EAR NOSE AND THROAT  for your care. Our goal is always to provide you with excellent care. Hearing back from our patients is one way we can continue to improve our services. Please take a few minutes to complete the written survey that you may receive in the mail after your visit with us. Thank you!             Your Updated Medication List - Protect others around you: Learn how to safely use, store and throw away your medicines at  www.disposemymeds.org.          This list is accurate as of 5/14/18 11:59 PM.  Always use your most recent med list.                   Brand Name Dispense Instructions for use Diagnosis    ABILIFY PO      Take 10 mg by mouth daily.        albuterol 108 (90 Base) MCG/ACT Inhaler    PROAIR HFA/PROVENTIL HFA/VENTOLIN HFA     Inhale 2 puffs into the lungs every 6 hours as needed for shortness of breath / dyspnea or wheezing        amLODIPine 10 MG tablet    NORVASC    30 tablet    Take 1 tablet (10 mg) by mouth daily    Essential hypertension       amoxicillin-clavulanate 875-125 MG per tablet    AUGMENTIN    10 tablet    Take 1 tablet by mouth 2 times daily    Subcutaneous emphysema after procedure, Facial cellulitis       DULOXETINE HCL PO      Take 120 mg by mouth daily        fluticasone-salmeterol 500-50 MCG/DOSE diskus inhaler    ADVAIR     Inhale 1 puff into the lungs every 12 hours        LAMOTRIGINE PO      Take 300 mg by mouth daily        lisinopril-hydrochlorothiazide 20-25 MG per tablet    PRINZIDE/ZESTORETIC    30 tablet    Take 1 tablet by mouth daily    Essential hypertension       NAPROXEN PO      Take 500 mg by mouth 2 times daily as needed for moderate pain        ROPINIROLE HCL PO      Take 2 mg by mouth nightly as needed        SINGULAIR 10 MG tablet   Generic drug:  montelukast      Take 10 mg by mouth At Bedtime

## 2018-05-15 LAB — COPATH REPORT: NORMAL

## 2018-05-16 ENCOUNTER — CARE COORDINATION (OUTPATIENT)
Dept: OTOLARYNGOLOGY | Facility: CLINIC | Age: 48
End: 2018-05-16

## 2018-05-16 NOTE — PROGRESS NOTES
Dr. Johnson reviewed [facility]'s pathology  [facility] [place] was called and notified of the results.  She has been referred to Dr. Mijares

## 2018-05-24 ENCOUNTER — OFFICE VISIT (OUTPATIENT)
Dept: INFECTIOUS DISEASES | Facility: CLINIC | Age: 48
End: 2018-05-24
Attending: INTERNAL MEDICINE
Payer: COMMERCIAL

## 2018-05-24 VITALS
WEIGHT: 238 LBS | DIASTOLIC BLOOD PRESSURE: 80 MMHG | HEIGHT: 64 IN | OXYGEN SATURATION: 94 % | SYSTOLIC BLOOD PRESSURE: 122 MMHG | BODY MASS INDEX: 40.63 KG/M2 | TEMPERATURE: 98.1 F | HEART RATE: 82 BPM

## 2018-05-24 DIAGNOSIS — L03.211 FACIAL CELLULITIS: Primary | ICD-10-CM

## 2018-05-24 DIAGNOSIS — Z22.321 METHICILLIN-SUSCEPTIBLE STAPHYLOCOCCUS AUREUS COLONIZATION: ICD-10-CM

## 2018-05-24 PROCEDURE — G0463 HOSPITAL OUTPT CLINIC VISIT: HCPCS | Mod: ZF

## 2018-05-24 ASSESSMENT — PAIN SCALES - GENERAL: PAINLEVEL: NO PAIN (0)

## 2018-05-24 NOTE — LETTER
5/24/2018      RE: Zayra Ramirez  [address] [place] [address]         Infectious Diseases Clinic: Progress Note     Patient:  Zayra Ramirez, Date of birth 1970, Medical record number [id number]  Date of Visit:  5/24/18         Assessment and Recommendations:   Problem List:  1. Bilateral subcutaneous emphysema of face after biopsy of oral lesion.  Significantly improved.  On Unasyn.  2. History of facial swelling after biopsy in 2/18 - no imaging needed. Treated by dentist as outpatient.   3. History of facial swelling due to dental abscess in 2015 - CT showed edema and mandibular osteomyelitis at that time - no emphysema  4. History of recurrent breast and groin abscesses - the one culture on record had MSSA and anaerobes  5. Nonhealing oral lesion of unclear etiology.  Now status post surgical closure.  6. Asthma  7. CPAP - but not used after procedure per patient    Discussion:  Ms. Ramirez certainly had an unusual presentation, especially in the absence of usual risk factors for subcutaneous emphysema after a procedure (use of air driven tools, severe coughing in the setting of asthma, etc.). The main concern from an infectious diseases standpoint was emphysema due to a gas forming organism. However, the rapid onset directly after the procedure and significant quick improvement argued against a severe infection and she improved with no surgical intervention and a fairly limited course of antibiotics. Her initial oral lesion healed with surgery but she now has a new lesion. She is undergoing further evaluation for these.     Recommendations:  1. Consider mild bleach baths to decrease episodes of S aureus furunculosis if it becomes problematic again.   2. No apparent ongoing infectious etiology related to facial swelling and oral ulcers.     Return to clinic PRN.     Isamar Arambula MD  Infectious Diseases  [phone]        History of Present Illness:   Ms. Ramirez is a 48-year-old woman  who was admitted on 4/4/2018 due to development of first the lateral and then bilateral facial swelling after a biopsy of an oral lesion that was done using a scalpel.  Due to a history of swelling with procedure she was reportedly given Decadron and clindamycin at the time of the procedure.  Despite this, she reported development of facial swelling within several minutes of returning home.  This progressed and she was admitted on the evening of 4/4/2018.  At that point she had a leukocytosis to 20 and a CT showed extensive bilateral facial subcutaneous emphysema.  She was started on Unasyn.  When seen today, she reports that she is feeling overall slightly better than yesterday and does not think that she is having progression of swelling at this point.    She does have a history of facial swelling most notably in 2015 when she had a known tooth abscess with associated mandibular osteomyelitis that was managed by the infectious disease group at [facility].  CTs from that time showed a more typical picture with edema rather than emphysema although per report her swelling was impressive.  She also had an episode of swelling when her nonhealing oral lesion was biopsied in February 2018.  We do not have records of that here which may be due to the fact that she reports it was managed rather easily as an outpatient by the dental team.  She does not remember having a CT with that episode.    Finally, the patient reports a history for the last several years of ongoing groin and breast abscesses.  These temporarily improved with a chlorhexidine bath but is since returned.  She has 1 small growing lesion right now but no other active lesions.  She does have some mild scarring related to these episodes.  She did not have extensive infections as a child.            Review of Systems:   4 point ROS including Respiratory, CV, GI and , other than that noted in the HPI,  is negative           Physical Exam:   Ranges forvital  signs:       Intake/Output Summary (Last 24 hours) at 18 0626  Last data filed at 18 1900   Gross per 24 hour   Intake              450 ml   Output                0 ml   Net              450 ml       Exam:  GENERAL:  well-developed, well-nourished, sitting in bed in no acute distress.   ENT: Significant bilateral facial swelling, significantly improved.  Able to evaluate oral lesions today.  No tongue swelling.  Site of previous lesion is sutured.  No purulent discharge noted.  EYES: Difficult to examine due to swelling.  NECK:  Supple.  LUNGS: Normal respiratory effort.  Not requiring supplemental oxygen.  ABDOMEN: Obese.  Not distended.  EXT: Extremities warm and without edema.  SKIN:  No acute rashes. No evidence of active staphylococcal infection.   NEUROLOGIC:  Grossly nonfocal.         Laboratory Data:     Creatinine   Date Value Ref Range Status   2018 0.73 0.52 - 1.04 mg/dL Final   2016 0.85 0.52 - 1.04 mg/dL Final   2016 0.62 0.52 - 1.04 mg/dL Final   2016 0.78 0.52 - 1.04 mg/dL Final   2016 0.74 0.52 - 1.04 mg/dL Final     WBC   Date Value Ref Range Status   2018 10.9 4.0 - 11.0 10e9/L Final   2018 9.6 4.0 - 11.0 10e9/L Final   2018 10.3 4.0 - 11.0 10e9/L Final   2018 8.8 4.0 - 11.0 10e9/L Final   2018 17.8 (H) 4.0 - 11.0 10e9/L Final     Hemoglobin   Date Value Ref Range Status   2018 14.2 11.7 - 15.7 g/dL Final     Platelet Count   Date Value Ref Range Status   2018 296 150 - 450 10e9/L Final     Lab Results   Component Value Date     2018    BUN 13 2018    CO2 29 2018          Pertinent Recent Microbiology Data:   Breast abscess aspirate from  with MSSA and mixed anaerobes         Imagin2018 maxillofacial CT:  IMPRESSION:     1. Extensive subcutaneous emphysema in both sides the face right  greater than left as described. The source of the subcutaneous  emphysema was not determined.     2. Periodontal disease left maxillary molar region where tooth has  been removed. No soft tissue abscess adjacent to this area of  maxillary periodontal disease.  3. No right-sided osteomyelitis identified.   4. Right mandibular focus of osteomyelitis on prior study of 8/7/:15  has normalized in the interval.          Isamar Arambula MD

## 2018-05-24 NOTE — NURSING NOTE
"Chief Complaint   Patient presents with     RECHECK     face cellulitis     /80  Pulse 82  Temp 98.1  F (36.7  C)  Ht 1.619 m (5' 3.75\")  Wt 108 kg (238 lb)  LMP  (LMP Unknown)  SpO2 94%  BMI 41.17 kg/m2    "

## 2018-05-24 NOTE — MR AVS SNAPSHOT
After Visit Summary   5/24/2018    Zayra Ramirez    MRN: [id number]           Patient Information     Date Of Birth          1970        Visit Information        Provider Department      5/24/2018 9:30 AM Isamar Arambula MD [facility] and Infectious Diseases        Care Instructions    For prevention of recurrent Staph aureus infections:   1. Clean high touch areas (counters, sinks, door knobs, tubs, toilet seats, etc.) with any commercial antibacterial household   2. Wash linens, clothing, and sporting equipment  3. Replace razors   4. Bleach baths once to twice weekly. Use 1 teaspoon of bleach per gallon or 1/4 cup per 13 gallons.            Follow-ups after your visit        Your next 10 appointments already scheduled     Jun 26, 2018  1:00 PM CDT   (Arrive by 12:45 PM)   New Patient Visit with Morro Mijares MD   [facility] Ear Nose and Throat ([facility] [place])    [address] [place] [address]  4th Floor  [facility] [address] [phone]              Who to contact     If you have questions or need follow up information about today's clinic visit or your schedule please contact [facility] AND INFECTIOUS DISEASES directly at [phone].  Normal or non-critical lab and imaging results will be communicated to you by MyChart, letter or phone within 4 business days after the clinic has received the results. If you do not hear from us within 7 days, please contact the clinic through MyChart or phone. If you have a critical or abnormal lab result, we will notify you by phone as soon as possible.  Submit refill requests through [email or web address] or call your pharmacy and they will forward the refill request to us. Please allow 3 business days for your refill to be completed.          Additional Information About Your Visit        Care EveryWhere ID     This is your Care EveryWhere ID. This could be used by other organizations to access your  "[facility] medical records  [id number]        Your Vitals Were     Pulse Temperature Height Last Period Pulse Oximetry BMI (Body Mass Index)    82 98.1  F (36.7  C) 1.619 m (5' 3.75\") (LMP Unknown) 94% 41.17 kg/m2       Blood Pressure from Last 3 Encounters:   05/24/18 122/80   04/26/18 141/83   04/08/18 158/76    Weight from Last 3 Encounters:   05/24/18 108 kg (238 lb)   05/14/18 108.9 kg (240 lb)   04/26/18 110.4 kg (243 lb 5 oz)              Today, you had the following     No orders found for display         Today's Medication Changes          These changes are accurate as of 5/24/18 10:25 AM.  If you have any questions, ask your nurse or doctor.               Stop taking these medicines if you haven't already. Please contact your care team if you have questions.     amoxicillin-clavulanate 875-125 MG per tablet   Commonly known as:  AUGMENTIN   Stopped by:  Isamar Arambula MD                    Primary Care Provider Office Phone # Fax #    Preeti Marrufo PA-C [phone]       [facility] [address] [facility] [address]        Equal Access to Services     ANAND MIRELES AH: Hadii jomar mccracken hadasho Soomaali, waaxda luqadaha, qaybta kaalmada adeegyada, waxay idiin haycristian torres. So [facility] [phone].    ATENCIÓN: Si habla español, tiene a diaz disposición servicios gratuitos de asistencia lingüística. Llame al [phone].    We comply with applicable federal civil rights laws and [place] laws. We do not discriminate on the basis of race, color, national origin, age, disability, sex, sexual orientation, or gender identity.            Thank you!     Thank you for choosing [facility] AND INFECTIOUS DISEASES  for your care. Our goal is always to provide you with excellent care. Hearing back from our patients is one way we can continue to improve our services. Please take a few minutes to complete the written survey that you may receive in the mail after " your visit with us. Thank you!             Your Updated Medication List - Protect others around you: Learn how to safely use, store and throw away your medicines at www.disposemymeds.org.          This list is accurate as of 5/24/18 10:25 AM.  Always use your most recent med list.                   Brand Name Dispense Instructions for use Diagnosis    ABILIFY PO      Take 10 mg by mouth daily.        albuterol 108 (90 Base) MCG/ACT Inhaler    PROAIR HFA/PROVENTIL HFA/VENTOLIN HFA     Inhale 2 puffs into the lungs every 6 hours as needed for shortness of breath / dyspnea or wheezing        amLODIPine 10 MG tablet    NORVASC    30 tablet    Take 1 tablet (10 mg) by mouth daily    Essential hypertension       DULOXETINE HCL PO      Take 120 mg by mouth daily        fluticasone-salmeterol 500-50 MCG/DOSE diskus inhaler    ADVAIR     Inhale 1 puff into the lungs every 12 hours        LAMOTRIGINE PO      Take 300 mg by mouth daily        lisinopril-hydrochlorothiazide 20-25 MG per tablet    PRINZIDE/ZESTORETIC    30 tablet    Take 1 tablet by mouth daily    Essential hypertension       NAPROXEN PO      Take 500 mg by mouth 2 times daily as needed for moderate pain        OXYGEN-HELIUM IN           ROPINIROLE HCL PO      Take 2 mg by mouth nightly as needed        SINGULAIR 10 MG tablet   Generic drug:  montelukast      Take 10 mg by mouth At Bedtime

## 2018-05-24 NOTE — PATIENT INSTRUCTIONS
For prevention of recurrent Staph aureus infections:   1. Clean high touch areas (counters, sinks, door knobs, tubs, toilet seats, etc.) with any commercial antibacterial household   2. Wash linens, clothing, and sporting equipment  3. Replace razors   4. Bleach baths once to twice weekly. Use 1 teaspoon of bleach per gallon or 1/4 cup per 13 gallons.

## 2018-05-24 NOTE — PROGRESS NOTES
Infectious Diseases Clinic: Progress Note     Patient:  Zayra Ramirez, Date of birth 1970, Medical record number [id number]  Date of Visit:  5/24/18         Assessment and Recommendations:   Problem List:  1. Bilateral subcutaneous emphysema of face after biopsy of oral lesion.  Significantly improved.  On Unasyn.  2. History of facial swelling after biopsy in 2/18 - no imaging needed. Treated by dentist as outpatient.   3. History of facial swelling due to dental abscess in 2015 - CT showed edema and mandibular osteomyelitis at that time - no emphysema  4. History of recurrent breast and groin abscesses - the one culture on record had MSSA and anaerobes  5. Nonhealing oral lesion of unclear etiology.  Now status post surgical closure.  6. Asthma  7. CPAP - but not used after procedure per patient    Discussion:  Ms. Ramirez certainly had an unusual presentation, especially in the absence of usual risk factors for subcutaneous emphysema after a procedure (use of air driven tools, severe coughing in the setting of asthma, etc.). The main concern from an infectious diseases standpoint was emphysema due to a gas forming organism. However, the rapid onset directly after the procedure and significant quick improvement argued against a severe infection and she improved with no surgical intervention and a fairly limited course of antibiotics. Her initial oral lesion healed with surgery but she now has a new lesion. She is undergoing further evaluation for these.     Recommendations:  1. Consider mild bleach baths to decrease episodes of S aureus furunculosis if it becomes problematic again.   2. No apparent ongoing infectious etiology related to facial swelling and oral ulcers.     Return to clinic PRN.     Isamar Arambula MD  Infectious Diseases  [phone]        History of Present Illness:   Ms. Ramirez is a 48-year-old woman who was admitted on 4/4/2018 due to development of first the lateral and then bilateral  facial swelling after a biopsy of an oral lesion that was done using a scalpel.  Due to a history of swelling with procedure she was reportedly given Decadron and clindamycin at the time of the procedure.  Despite this, she reported development of facial swelling within several minutes of returning home.  This progressed and she was admitted on the evening of 4/4/2018.  At that point she had a leukocytosis to 20 and a CT showed extensive bilateral facial subcutaneous emphysema.  She was started on Unasyn.  When seen today, she reports that she is feeling overall slightly better than yesterday and does not think that she is having progression of swelling at this point.    She does have a history of facial swelling most notably in 2015 when she had a known tooth abscess with associated mandibular osteomyelitis that was managed by the infectious disease group at [facility].  CTs from that time showed a more typical picture with edema rather than emphysema although per report her swelling was impressive.  She also had an episode of swelling when her nonhealing oral lesion was biopsied in February 2018.  We do not have records of that here which may be due to the fact that she reports it was managed rather easily as an outpatient by the dental team.  She does not remember having a CT with that episode.    Finally, the patient reports a history for the last several years of ongoing groin and breast abscesses.  These temporarily improved with a chlorhexidine bath but is since returned.  She has 1 small growing lesion right now but no other active lesions.  She does have some mild scarring related to these episodes.  She did not have extensive infections as a child.            Review of Systems:   4 point ROS including Respiratory, CV, GI and , other than that noted in the HPI,  is negative           Physical Exam:   Ranges forvital signs:       Intake/Output Summary (Last 24 hours) at 04/06/18 0642  Last data filed at  18 1900   Gross per 24 hour   Intake              450 ml   Output                0 ml   Net              450 ml       Exam:  GENERAL:  well-developed, well-nourished, sitting in bed in no acute distress.   ENT: Significant bilateral facial swelling, significantly improved.  Able to evaluate oral lesions today.  No tongue swelling.  Site of previous lesion is sutured.  No purulent discharge noted.  EYES: Difficult to examine due to swelling.  NECK:  Supple.  LUNGS: Normal respiratory effort.  Not requiring supplemental oxygen.  ABDOMEN: Obese.  Not distended.  EXT: Extremities warm and without edema.  SKIN:  No acute rashes. No evidence of active staphylococcal infection.   NEUROLOGIC:  Grossly nonfocal.         Laboratory Data:     Creatinine   Date Value Ref Range Status   2018 0.73 0.52 - 1.04 mg/dL Final   2016 0.85 0.52 - 1.04 mg/dL Final   2016 0.62 0.52 - 1.04 mg/dL Final   2016 0.78 0.52 - 1.04 mg/dL Final   2016 0.74 0.52 - 1.04 mg/dL Final     WBC   Date Value Ref Range Status   2018 10.9 4.0 - 11.0 10e9/L Final   2018 9.6 4.0 - 11.0 10e9/L Final   2018 10.3 4.0 - 11.0 10e9/L Final   2018 8.8 4.0 - 11.0 10e9/L Final   2018 17.8 (H) 4.0 - 11.0 10e9/L Final     Hemoglobin   Date Value Ref Range Status   2018 14.2 11.7 - 15.7 g/dL Final     Platelet Count   Date Value Ref Range Status   2018 296 150 - 450 10e9/L Final     Lab Results   Component Value Date     2018    BUN 13 2018    CO2 29 2018          Pertinent Recent Microbiology Data:   Breast abscess aspirate from  with MSSA and mixed anaerobes         Imagin2018 maxillofacial CT:  IMPRESSION:     1. Extensive subcutaneous emphysema in both sides the face right  greater than left as described. The source of the subcutaneous  emphysema was not determined.    2. Periodontal disease left maxillary molar region where tooth has  been removed. No  soft tissue abscess adjacent to this area of  maxillary periodontal disease.  3. No right-sided osteomyelitis identified.   4. Right mandibular focus of osteomyelitis on prior study of 8/7/:15  has normalized in the interval.

## 2018-05-31 ENCOUNTER — TRANSFERRED RECORDS (OUTPATIENT)
Dept: HEALTH INFORMATION MANAGEMENT | Facility: CLINIC | Age: 48
End: 2018-05-31

## 2018-07-03 ENCOUNTER — OFFICE VISIT (OUTPATIENT)
Dept: OTOLARYNGOLOGY | Facility: CLINIC | Age: 48
End: 2018-07-03
Payer: COMMERCIAL

## 2018-07-03 VITALS — BODY MASS INDEX: 41.15 KG/M2 | HEIGHT: 64 IN | WEIGHT: 241 LBS

## 2018-07-03 DIAGNOSIS — K12.1 STOMATITIS AND MUCOSITIS: Primary | ICD-10-CM

## 2018-07-03 DIAGNOSIS — K12.30 STOMATITIS AND MUCOSITIS: Primary | ICD-10-CM

## 2018-07-03 ASSESSMENT — PAIN SCALES - GENERAL: PAINLEVEL: MILD PAIN (2)

## 2018-07-03 NOTE — MR AVS SNAPSHOT
"              After Visit Summary   7/3/2018    Zayra Ramirez    MRN: [id number]           Patient Information     Date Of Birth          1970        Visit Information        Provider Department      7/3/2018 1:00 PM Morro Mijares MD [facility] Ear Nose and Throat        Care Instructions    1.  You were seen in the ENT Clinic today by Dr. Mijares.  If you have any questions or concerns after your appointment, please call [phone].  Press option #1 for scheduling related needs.  Press option #3 for Nurse advice.  2.  Plan is to return to clinic in 6 weeks after using gentian violet on the oral ulcers, 3-4 times a week.      Shara TRUJILLO, RN  [facility] ENT   Head & Neck Surgery                 Follow-ups after your visit        Follow-up notes from your care team     Return in about 6 weeks (around 8/14/2018).      Your next 10 appointments already scheduled     Aug 14, 2018  2:00 PM CDT   (Arrive by 1:45 PM)   Return Visit with MD ROBER Kim [facility] Ear Nose and Throat ([facility] [place])    [address] [place] [address] [phone]              Who to contact     Please call your clinic at [phone] to:    Ask questions about your health    Make or cancel appointments    Discuss your medicines    Learn about your test results    Speak to your doctor            Additional Information About Your Visit        Care EveryWhere ID     This is your Care EveryWhere ID. This could be used by other organizations to access your [facility] medical records  [id number]        Your Vitals Were     Height Last Period BMI (Body Mass Index)             1.619 m (5' 3.74\") (LMP Unknown) 41.71 kg/m2          Blood Pressure from Last 3 Encounters:   05/24/18 122/80   04/26/18 141/83   04/08/18 158/76    Weight from Last 3 Encounters:   07/03/18 109.3 kg (241 lb)   05/24/18 108 kg (238 lb)   05/14/18 108.9 kg (240 lb)              Today, " you had the following     No orders found for display       Primary Care Provider Office Phone # Fax #    Preeti Marrufo PA-C [phone]       [facility] [address]  [facility] [address]        Equal Access to Services     GLENNAANAND CHI : Hadii aad ku hadasho Soomaali, waaxda luqadaha, qaybta kaalmada adeegyada, waxay idiin hayaan adeeg khmeghash la'eliotn . So [facility] [phone].    ATENCIÓN: Si habla español, tiene a diaz disposición servicios gratuitos de asistencia lingüística. Llame al [phone].    We comply with applicable federal civil rights laws and [place] laws. We do not discriminate on the basis of race, color, national origin, age, disability, sex, sexual orientation, or gender identity.            Thank you!     Thank you for choosing [facility] EAR NOSE AND THROAT  for your care. Our goal is always to provide you with excellent care. Hearing back from our patients is one way we can continue to improve our services. Please take a few minutes to complete the written survey that you may receive in the mail after your visit with us. Thank you!             Your Updated Medication List - Protect others around you: Learn how to safely use, store and throw away your medicines at www.disposemymeds.org.          This list is accurate as of 7/3/18  1:36 PM.  Always use your most recent med list.                   Brand Name Dispense Instructions for use Diagnosis    ABILIFY PO      Take 10 mg by mouth daily.        albuterol 108 (90 Base) MCG/ACT Inhaler    PROAIR HFA/PROVENTIL HFA/VENTOLIN HFA     Inhale 2 puffs into the lungs every 6 hours as needed for shortness of breath / dyspnea or wheezing        amLODIPine 10 MG tablet    NORVASC    30 tablet    Take 1 tablet (10 mg) by mouth daily    Essential hypertension       DULOXETINE HCL PO      Take 120 mg by mouth daily        fluticasone-salmeterol 500-50 MCG/DOSE diskus inhaler    ADVAIR     Inhale 1 puff into the lungs every 12  hours        LAMOTRIGINE PO      Take 300 mg by mouth daily        lisinopril-hydrochlorothiazide 20-25 MG per tablet    PRINZIDE/ZESTORETIC    30 tablet    Take 1 tablet by mouth daily    Essential hypertension       NAPROXEN PO      Take 500 mg by mouth 2 times daily as needed for moderate pain        OXYGEN-HELIUM IN           ROPINIROLE HCL PO      Take 2 mg by mouth nightly as needed        SINGULAIR 10 MG tablet   Generic drug:  montelukast      Take 10 mg by mouth At Bedtime

## 2018-07-03 NOTE — LETTER
7/3/2018       RE: Zayra [facility]  [address] [place] [address]     Dear Colleague,    Thank you for referring your patient, Zayra Ramirez, to the [facility] at [facility]. Please see a copy of my visit note below.    Dear Dr. Johnson:    I had the pleasure of meeting Zayra Ramirez in consultation today at the [facility] Otolaryngology [facility] at your request.     History of Present Illness:     HISTORY OF PRESENT ILLNESS:  This is a 48-year-old non-diabetic patient who for two years, has had left greater than right mucosal ulcerations and granulation tissue.  Biopsies did not show cancer, but essentially nonhealing ulcers.  One was manipulated surgically on the right side and still is somewhat present.  The one on the left side has been treated with standard kinds of measures including Magic mouthwashes and things of this nature.  She has no other current complaints at the present time today regarding her mouth.                  MEDICATIONS:     Current Outpatient Prescriptions   Medication Sig Dispense Refill     albuterol (PROAIR HFA, PROVENTIL HFA, VENTOLIN HFA) 108 (90 BASE) MCG/ACT inhaler Inhale 2 puffs into the lungs every 6 hours as needed for shortness of breath / dyspnea or wheezing       amLODIPine (NORVASC) 10 MG tablet Take 1 tablet (10 mg) by mouth daily 30 tablet 0     ARIPiprazole (ABILIFY PO) Take 10 mg by mouth daily.       DULOXETINE HCL PO Take 120 mg by mouth daily       fluticasone-salmeterol (ADVAIR) 500-50 MCG/DOSE diskus inhaler Inhale 1 puff into the lungs every 12 hours       LAMOTRIGINE PO Take 300 mg by mouth daily       lisinopril-hydrochlorothiazide (PRINZIDE/ZESTORETIC) 20-25 MG per tablet Take 1 tablet by mouth daily 30 tablet 0     montelukast (SINGULAIR) 10 MG tablet Take 10 mg by mouth At Bedtime       NAPROXEN PO Take 500 mg by mouth 2 times daily as needed for moderate pain        OXYGEN-HELIUM IN        ROPINIROLE HCL PO Take 2 mg by mouth nightly as needed          ALLERGIES:    Allergies   Allergen Reactions     Bee Venom Anaphylaxis     Bees      Doxycycline Anaphylaxis     Erythromycin Anaphylaxis and Shortness Of Breath     Other reaction(s): Vomiting       HABITS/SOCIAL HISTORY: no smonkin    PAST MEDICAL HISTORY:   Past Medical History:   Diagnosis Date     Allergic rhinitis      Anemia      Arthritis      Asthma     copd     Dental abscess 8-2015     Depressive disorder      Gastroesophageal reflux disease      History of emphysema      Hoarseness      Hypertension      Morbid obesity with BMI of 40.0-44.9, adult (H)      Obstructive sleep apnea      Respiratory bronchiolitis interstitial lung disease (H)      Sleep apnea         FAMILY HISTORY:    Family History   Problem Relation Age of Onset     Other Cancer Father      base of tongue cancer at age ~65     Hypertension Father      Asthma Mother      REVIEW OF SYSTEMS:  12 point ROS was negative other than the symptoms noted above in the HPI.    PHYSICAL EXAMINATION: *    PHYSICAL EXAMINATION:    Constitutional:  The patient was unaccompanied, well-groomed, and in no acute distress.    Skin:  Warm and pink.    Neurologic:  Alert and oriented x 3.  CN's III-XII within normal limits.  Voice normal.   Psychiatric:  The patient's affect was calm, cooperative, and appropriate.    Respiratory:  Breathing comfortably without stridor or exertion of accessory muscles.    Eyes: Pupils were equal and reactive.  Extraocular movement intact.    Head:  Normocephalic and atraumatic.  No lesions or scars.    Ears:  Pinnae and tragus non-tender.  EAC's and TM's were clear.     Nose:  Sinuses were non-tender.  Anterior rhinoscopy revealed midline septum and absence of purulence or polyps.    OC/OP:  Normal tongue, floor of mouth, left > right buccal mucosa ulcer with granulation tissue present. buccal mucosa, and palate.  No lesions or masses on  inspection or palpation.  No abnormal lymph tissue in the oropharynx.  The pterygoid region is non-tender.     normal laryngeal and tracheal landmarks.  The parotid beds were without masses.  No palpable thyroid.  Lymphatic:  There is no palpable lymphadenopathy in the neck.        IMPRESSION AND PLAN: *ASSESSMENT AND PLAN:  We painted lesions in her mouth with gentian violet today.  They are going to be doing three to four times a week gentian violet paintings of there.  We will see how she is doing in six weeks.   We will go ahead and think about excising whole thing for complete biopsy as well as to promote healing.  This is sizable on the left side though unfortunately.      Thank you very much for the opportunity to participate in the care of your patient.      Morro Mijares M.D.  Otolaryngology- Head & Neck Surgery  [phone]

## 2018-07-03 NOTE — PROGRESS NOTES
Dear Dr. Johnson:    I had the pleasure of meeting Zayra Ramirez in consultation today at the [facility] at your request.     History of Present Illness:     HISTORY OF PRESENT ILLNESS:  This is a 48-year-old non-diabetic patient who for two years, has had left greater than right mucosal ulcerations and granulation tissue.  Biopsies did not show cancer, but essentially nonhealing ulcers.  One was manipulated surgically on the right side and still is somewhat present.  The one on the left side has been treated with standard kinds of measures including Magic mouthwashes and things of this nature.  She has no other current complaints at the present time today regarding her mouth.                  MEDICATIONS:     Current Outpatient Prescriptions   Medication Sig Dispense Refill     albuterol (PROAIR HFA, PROVENTIL HFA, VENTOLIN HFA) 108 (90 BASE) MCG/ACT inhaler Inhale 2 puffs into the lungs every 6 hours as needed for shortness of breath / dyspnea or wheezing       amLODIPine (NORVASC) 10 MG tablet Take 1 tablet (10 mg) by mouth daily 30 tablet 0     ARIPiprazole (ABILIFY PO) Take 10 mg by mouth daily.       DULOXETINE HCL PO Take 120 mg by mouth daily       fluticasone-salmeterol (ADVAIR) 500-50 MCG/DOSE diskus inhaler Inhale 1 puff into the lungs every 12 hours       LAMOTRIGINE PO Take 300 mg by mouth daily       lisinopril-hydrochlorothiazide (PRINZIDE/ZESTORETIC) 20-25 MG per tablet Take 1 tablet by mouth daily 30 tablet 0     montelukast (SINGULAIR) 10 MG tablet Take 10 mg by mouth At Bedtime       NAPROXEN PO Take 500 mg by mouth 2 times daily as needed for moderate pain       OXYGEN-HELIUM IN        ROPINIROLE HCL PO Take 2 mg by mouth nightly as needed          ALLERGIES:    Allergies   Allergen Reactions     Bee Venom Anaphylaxis     Bees      Doxycycline Anaphylaxis     Erythromycin Anaphylaxis and Shortness Of Breath     Other reaction(s): Vomiting       HABITS/SOCIAL  HISTORY: no smonkin    PAST MEDICAL HISTORY:   Past Medical History:   Diagnosis Date     Allergic rhinitis      Anemia      Arthritis      Asthma     copd     Dental abscess 8-2015     Depressive disorder      Gastroesophageal reflux disease      History of emphysema      Hoarseness      Hypertension      Morbid obesity with BMI of 40.0-44.9, adult (H)      Obstructive sleep apnea      Respiratory bronchiolitis interstitial lung disease (H)      Sleep apnea         FAMILY HISTORY:    Family History   Problem Relation Age of Onset     Other Cancer Father      base of tongue cancer at age ~65     Hypertension Father      Asthma Mother        REVIEW OF SYSTEMS:  12 point ROS was negative other than the symptoms noted above in the HPI.    PHYSICAL EXAMINATION: *      PHYSICAL EXAMINATION:    Constitutional:  The patient was unaccompanied, well-groomed, and in no acute distress.    Skin:  Warm and pink.    Neurologic:  Alert and oriented x 3.  CN's III-XII within normal limits.  Voice normal.   Psychiatric:  The patient's affect was calm, cooperative, and appropriate.    Respiratory:  Breathing comfortably without stridor or exertion of accessory muscles.    Eyes: Pupils were equal and reactive.  Extraocular movement intact.    Head:  Normocephalic and atraumatic.  No lesions or scars.    Ears:  Pinnae and tragus non-tender.  EAC's and TM's were clear.     Nose:  Sinuses were non-tender.  Anterior rhinoscopy revealed midline septum and absence of purulence or polyps.    OC/OP:  Normal tongue, floor of mouth, left > right buccal mucosa ulcer with granulation tissue present. buccal mucosa, and palate.  No lesions or masses on inspection or palpation.  No abnormal lymph tissue in the oropharynx.  The pterygoid region is non-tender.     normal laryngeal and tracheal landmarks.  The parotid beds were without masses.  No palpable thyroid.  Lymphatic:  There is no palpable lymphadenopathy in the neck.        IMPRESSION AND  PLAN: *ASSESSMENT AND PLAN:  We painted lesions in her mouth with gentian violet today.  They are going to be doing three to four times a week gentian violet paintings of there.  We will see how she is doing in six weeks.   We will go ahead and think about excising whole thing for complete biopsy as well as to promote healing.  This is sizable on the left side though unfortunately.      FO/ms   **    Thank you very much for the opportunity to participate in the care of your patient.      Morro Mijares M.D.  Otolaryngology- Head & Neck Surgery  [phone]

## 2018-08-14 ENCOUNTER — OFFICE VISIT (OUTPATIENT)
Dept: OTOLARYNGOLOGY | Facility: CLINIC | Age: 48
End: 2018-08-14
Payer: COMMERCIAL

## 2018-08-14 VITALS — WEIGHT: 232 LBS | BODY MASS INDEX: 41.11 KG/M2 | HEIGHT: 63 IN

## 2018-08-14 DIAGNOSIS — L03.211 FACIAL CELLULITIS: Primary | ICD-10-CM

## 2018-08-14 ASSESSMENT — PAIN SCALES - GENERAL: PAINLEVEL: SEVERE PAIN (7)

## 2018-08-14 NOTE — PATIENT INSTRUCTIONS
Patient will call in about 4 weeks for bilateral buccal lesion excisions    Morro Mijares  Plan of care:  Follow up with Dr Mijares in 4 weeks  Clinic contact information:  1. To schedule an appointment or to speak to someone regarding your medical care, please call [phone], option #1  2. ThaliaRN: [phone]  3. Surgery scheduling:      Barbara Hare: [phone]      Izabella Trujillo: [phone]  4. Fax: [phone]  5. [phone]

## 2018-08-14 NOTE — MR AVS SNAPSHOT
"              After Visit Summary   2018    Zayra Ramirez    MRN: [id number]           Patient Information     Date Of Birth          1970        Visit Information        Provider Department      2018 2:00 PM Morro Mijares MD [facility]        Today's Diagnoses     Facial cellulitis    -  1      Care Instructions    Patient will call in about 4 weeks for bilateral buccal lesion excisions    Morro Mijares  Plan of care:  Follow up with Dr Mijares in 4 weeks  Clinic contact information:  1. To schedule an appointment or to speak to someone regarding your medical care, please call [phone], option #1  2. ThaliaRN: [phone]  3. Surgery scheduling:      Barbara Hare: [phone]      Izabella Trujillo: [phone]  4. Fax: [phone]  5. [phone]            Follow-ups after your visit        Who to contact     Please call your clinic at [phone] to:    Ask questions about your health    Make or cancel appointments    Discuss your medicines    Learn about your test results    Speak to your doctor            Additional Information About Your Visit        Care EveryWhere ID     This is your Care EveryWhere ID. This could be used by other organizations to access your [facility] medical records  [id number]        Your Vitals Were     Height Last Period BMI (Body Mass Index)             1.6 m (5' 3\") (LMP Unknown) 41.1 kg/m2          Blood Pressure from Last 3 Encounters:   18 122/80   18 141/83   18 158/76    Weight from Last 3 Encounters:   18 105.2 kg (232 lb)   18 109.3 kg (241 lb)   18 108 kg (238 lb)              Today, you had the following     No orders found for display         Today's Medication Changes          These changes are accurate as of 18 11:59 PM.  If you have any questions, ask your nurse or doctor.               Stop taking these medicines if you haven't already. Please contact your care team " if you have questions.     LAMOTRIGINE PO   Stopped by:  Morro Mijares MD                    Primary Care Provider Office Phone # Fax #    Preeti Marrufo PA-C [phone]       [facility] [address]  [facility] [address]        Equal Access to Services     [facility]: Hadii aad ku hadasho Soomaali, waaxda luqadaha, qaybta kaalmada adeegyada, waxay idiin hayaan adeeg khmeghash la'aan . So [facility] [phone].    ATENCIÓN: Si habla español, tiene a diaz disposición servicios gratuitos de asistencia lingüística. Patria al [phone].    We comply with applicable federal civil rights laws and [place] laws. We do not discriminate on the basis of race, color, national origin, age, disability, sex, sexual orientation, or gender identity.            Thank you!     Thank you for choosing [facility]  for your care. Our goal is always to provide you with excellent care. Hearing back from our patients is one way we can continue to improve our services. Please take a few minutes to complete the written survey that you may receive in the mail after your visit with us. Thank you!             Your Updated Medication List - Protect others around you: Learn how to safely use, store and throw away your medicines at www.disposemymeds.org.          This list is accurate as of 8/14/18 11:59 PM.  Always use your most recent med list.                   Brand Name Dispense Instructions for use Diagnosis    ABILIFY PO      Take 10 mg by mouth daily.        albuterol 108 (90 Base) MCG/ACT inhaler    PROAIR HFA/PROVENTIL HFA/VENTOLIN HFA     Inhale 2 puffs into the lungs every 6 hours as needed for shortness of breath / dyspnea or wheezing        amLODIPine 10 MG tablet    NORVASC    30 tablet    Take 1 tablet (10 mg) by mouth daily    Essential hypertension       DULOXETINE HCL PO      Take 120 mg by mouth daily        fluticasone-salmeterol 500-50 MCG/DOSE diskus inhaler    ADVAIR      Inhale 1 puff into the lungs every 12 hours        KENALOG 10 MG/ML injection   Generic drug:  triamcinolone acetonide     20 mL    Inject 20mg today    Facial cellulitis       lisinopril-hydrochlorothiazide 20-25 MG per tablet    PRINZIDE/ZESTORETIC    30 tablet    Take 1 tablet by mouth daily    Essential hypertension       NAPROXEN PO      Take 500 mg by mouth 2 times daily as needed for moderate pain        OXYGEN-HELIUM IN           ROPINIROLE HCL PO      Take 2 mg by mouth nightly as needed        SINGULAIR 10 MG tablet   Generic drug:  montelukast      Take 10 mg by mouth At Bedtime

## 2018-08-14 NOTE — PROGRESS NOTES
HISTORY OF PRESENT ILLNESS:  Zayra Ramirez is 48 years of age.  She is here for follow-up today.  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  We painted these with gentian violet.  They have helped her a little bit, but still cause her pain.  She is not having bruxism from these.  They have not been biopsied.  They are chronic ulcers of the mouth at this point in time.      PHYSICAL EXAMINATION:  The patient is alert, oriented x3 and pleasant.  Skin, face, lips, and neck on her is quite normal.  Oral cavity and oropharynx is examined.  There are areas on both the right and the left side with some ulcer in this area.  They are symmetric and they are not otherwise lichen planus. They are a bit unusual in appearance but do not seem to be cancer.  There are no other abnormalities in the oral cavity noted.  Neck exam shows no masses, adenopathy or thyromegaly.        PROCEDURE:  After verbal consent from the patient today, I went ahead and injected each of these with 10 mg of steroid on both sides.      PLAN:  We will see how this goes.  We already mentioned to her about the benefits and risks of surgery to have these things simply excised as an outpatient.  She would want to have that done if this last maneuver does not work.      FO/ms

## 2018-08-14 NOTE — LETTER
8/14/2018       RE: Zayra Ramirez  [address] [place] [address]     Dear Colleague,    Thank you for referring your patient, Zayra Ramirez, to the [facility] at [facility]. Please see a copy of my visit note below.    HISTORY OF PRESENT ILLNESS:  Zayra Ramirez is 48 years of age.  She is here for follow-up today.  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  We painted these with gentian violet.  They have helped her a little bit, but still cause her pain.  She is not having bruxism from these.  They have not been biopsied.  They are chronic ulcers of the mouth at this point in time.      PHYSICAL EXAMINATION:  The patient is alert, oriented x3 and pleasant.  Skin, face, lips, and neck on her is quite normal.  Oral cavity and oropharynx is examined.  There are areas on both the right and the left side with some ulcer in this area.  They are symmetric and they are not otherwise lichen planus. They are a bit unusual in appearance but do not seem to be cancer.  There are no other abnormalities in the oral cavity noted.  Neck exam shows no masses, adenopathy or thyromegaly.        PROCEDURE:  After verbal consent from the patient today, I went ahead and injected each of these with 10 mg of steroid on both sides.      PLAN:  We will see how this goes.  We already mentioned to her about the benefits and risks of surgery to have these things simply excised as an outpatient.  She would want to have that done if this last maneuver does not work.      FO/ms         Again, thank you for allowing me to participate in the care of your patient.      Sincerely,    Morro Mijares MD

## 2018-08-14 NOTE — NURSING NOTE
"Chief Complaint   Patient presents with     RECHECK     Return Dr. Johnson referral for oral lesion. 6 wk F/U. Pt states today she is having left and right sided mouth pain of 4-7.        Ht 1.6 m (5' 3\")  Wt 105.2 kg (232 lb)  LMP  (LMP Unknown)  BMI 41.1 kg/m2    [place]    "

## 2018-09-07 ENCOUNTER — TELEPHONE (OUTPATIENT)
Dept: OTOLARYNGOLOGY | Facility: CLINIC | Age: 48
End: 2018-09-07

## 2018-09-07 NOTE — TELEPHONE ENCOUNTER
Patient is scheduled for surgery with Dr. Mijares      Spoke or left message with: Patient    Date of Surgery: 10/3/18    Location:  OR [place]    Informed patient they will need an adult  Yes    Pre-op with surgeon (if applicable): N/A    H&P: Scheduled with PCP @ [facility]    Additional imaging/appointments: None    Surgery packet: Mailed 9/7/18    Additional comments: Postop appt on 10/23 9:00am

## 2018-10-02 ENCOUNTER — ANESTHESIA EVENT (OUTPATIENT)
Dept: SURGERY | Facility: CLINIC | Age: 48
End: 2018-10-02
Payer: COMMERCIAL

## 2018-10-03 ENCOUNTER — HOSPITAL ENCOUNTER (OUTPATIENT)
Facility: CLINIC | Age: 48
Discharge: HOME | End: 2018-10-03
Attending: OTOLARYNGOLOGY | Admitting: OTOLARYNGOLOGY
Payer: COMMERCIAL

## 2018-10-03 ENCOUNTER — ANESTHESIA (OUTPATIENT)
Dept: SURGERY | Facility: CLINIC | Age: 48
End: 2018-10-03
Payer: COMMERCIAL

## 2018-10-03 VITALS
RESPIRATION RATE: 18 BRPM | TEMPERATURE: 98.4 F | BODY MASS INDEX: 38.13 KG/M2 | OXYGEN SATURATION: 93 % | HEIGHT: 64 IN | DIASTOLIC BLOOD PRESSURE: 82 MMHG | WEIGHT: 223.33 LBS | SYSTOLIC BLOOD PRESSURE: 112 MMHG

## 2018-10-03 DIAGNOSIS — K12.1 ORAL ULCER: Primary | ICD-10-CM

## 2018-10-03 LAB
CREAT SERPL-MCNC: 0.78 MG/DL (ref 0.52–1.04)
GFR SERPL CREATININE-BSD FRML MDRD: 79 ML/MIN/1.7M2
GLUCOSE BLDC GLUCOMTR-MCNC: 90 MG/DL (ref 70–99)
HGB BLD-MCNC: 14.5 G/DL (ref 11.7–15.7)
POTASSIUM SERPL-SCNC: 3.6 MMOL/L (ref 3.4–5.3)

## 2018-10-03 PROCEDURE — 84132 ASSAY OF SERUM POTASSIUM: CPT | Performed by: ANESTHESIOLOGY

## 2018-10-03 PROCEDURE — 25000125 ZZHC RX 250: Performed by: NURSE ANESTHETIST, CERTIFIED REGISTERED

## 2018-10-03 PROCEDURE — 88305 TISSUE EXAM BY PATHOLOGIST: CPT | Performed by: OTOLARYNGOLOGY

## 2018-10-03 PROCEDURE — 25000125 ZZHC RX 250: Performed by: ANESTHESIOLOGY

## 2018-10-03 PROCEDURE — 82565 ASSAY OF CREATININE: CPT | Performed by: ANESTHESIOLOGY

## 2018-10-03 PROCEDURE — 25000128 H RX IP 250 OP 636: Performed by: NURSE ANESTHETIST, CERTIFIED REGISTERED

## 2018-10-03 PROCEDURE — 25000565 ZZH ISOFLURANE, EA 15 MIN: Performed by: OTOLARYNGOLOGY

## 2018-10-03 PROCEDURE — 36415 COLL VENOUS BLD VENIPUNCTURE: CPT | Performed by: ANESTHESIOLOGY

## 2018-10-03 PROCEDURE — 71000027 ZZH RECOVERY PHASE 2 EACH 15 MINS: Performed by: OTOLARYNGOLOGY

## 2018-10-03 PROCEDURE — 25000125 ZZHC RX 250: Performed by: OTOLARYNGOLOGY

## 2018-10-03 PROCEDURE — 40000170 ZZH STATISTIC PRE-PROCEDURE ASSESSMENT II: Performed by: OTOLARYNGOLOGY

## 2018-10-03 PROCEDURE — 82962 GLUCOSE BLOOD TEST: CPT

## 2018-10-03 PROCEDURE — 25000132 ZZH RX MED GY IP 250 OP 250 PS 637: Performed by: STUDENT IN AN ORGANIZED HEALTH CARE EDUCATION/TRAINING PROGRAM

## 2018-10-03 PROCEDURE — 88342 IMHCHEM/IMCYTCHM 1ST ANTB: CPT | Performed by: OTOLARYNGOLOGY

## 2018-10-03 PROCEDURE — 37000009 ZZH ANESTHESIA TECHNICAL FEE, EACH ADDTL 15 MIN: Performed by: OTOLARYNGOLOGY

## 2018-10-03 PROCEDURE — 37000008 ZZH ANESTHESIA TECHNICAL FEE, 1ST 30 MIN: Performed by: OTOLARYNGOLOGY

## 2018-10-03 PROCEDURE — 36000064 ZZH SURGERY LEVEL 4 EA 15 ADDTL MIN - UMMC: Performed by: OTOLARYNGOLOGY

## 2018-10-03 PROCEDURE — 85018 HEMOGLOBIN: CPT | Performed by: ANESTHESIOLOGY

## 2018-10-03 PROCEDURE — 71000014 ZZH RECOVERY PHASE 1 LEVEL 2 FIRST HR: Performed by: OTOLARYNGOLOGY

## 2018-10-03 PROCEDURE — 27210794 ZZH OR GENERAL SUPPLY STERILE: Performed by: OTOLARYNGOLOGY

## 2018-10-03 PROCEDURE — 36000062 ZZH SURGERY LEVEL 4 1ST 30 MIN - UMMC: Performed by: OTOLARYNGOLOGY

## 2018-10-03 PROCEDURE — 25000128 H RX IP 250 OP 636: Performed by: ANESTHESIOLOGY

## 2018-10-03 RX ORDER — ALBUTEROL SULFATE 0.83 MG/ML
2.5 SOLUTION RESPIRATORY (INHALATION) ONCE
Status: COMPLETED | OUTPATIENT
Start: 2018-10-03 | End: 2018-10-03

## 2018-10-03 RX ORDER — DEXAMETHASONE SODIUM PHOSPHATE 4 MG/ML
INJECTION, SOLUTION INTRA-ARTICULAR; INTRALESIONAL; INTRAMUSCULAR; INTRAVENOUS; SOFT TISSUE PRN
Status: DISCONTINUED | OUTPATIENT
Start: 2018-10-03 | End: 2018-10-03

## 2018-10-03 RX ORDER — NALOXONE HYDROCHLORIDE 0.4 MG/ML
.1-.4 INJECTION, SOLUTION INTRAMUSCULAR; INTRAVENOUS; SUBCUTANEOUS
Status: DISCONTINUED | OUTPATIENT
Start: 2018-10-03 | End: 2018-10-03 | Stop reason: HOSPADM

## 2018-10-03 RX ORDER — LABETALOL HYDROCHLORIDE 5 MG/ML
5 INJECTION, SOLUTION INTRAVENOUS EVERY 10 MIN PRN
Status: DISCONTINUED | OUTPATIENT
Start: 2018-10-03 | End: 2018-10-03 | Stop reason: HOSPADM

## 2018-10-03 RX ORDER — DEXAMETHASONE SODIUM PHOSPHATE 4 MG/ML
4 INJECTION, SOLUTION INTRA-ARTICULAR; INTRALESIONAL; INTRAMUSCULAR; INTRAVENOUS; SOFT TISSUE EVERY 10 MIN PRN
Status: DISCONTINUED | OUTPATIENT
Start: 2018-10-03 | End: 2018-10-03 | Stop reason: HOSPADM

## 2018-10-03 RX ORDER — FENTANYL CITRATE 50 UG/ML
25-50 INJECTION, SOLUTION INTRAMUSCULAR; INTRAVENOUS
Status: DISCONTINUED | OUTPATIENT
Start: 2018-10-03 | End: 2018-10-03 | Stop reason: HOSPADM

## 2018-10-03 RX ORDER — IPRATROPIUM BROMIDE AND ALBUTEROL SULFATE 2.5; .5 MG/3ML; MG/3ML
3 SOLUTION RESPIRATORY (INHALATION)
Status: DISCONTINUED | OUTPATIENT
Start: 2018-10-03 | End: 2018-10-03 | Stop reason: HOSPADM

## 2018-10-03 RX ORDER — HYDRALAZINE HYDROCHLORIDE 20 MG/ML
2.5-5 INJECTION INTRAMUSCULAR; INTRAVENOUS EVERY 10 MIN PRN
Status: DISCONTINUED | OUTPATIENT
Start: 2018-10-03 | End: 2018-10-03 | Stop reason: HOSPADM

## 2018-10-03 RX ORDER — CHLORHEXIDINE GLUCONATE ORAL RINSE 1.2 MG/ML
15 SOLUTION DENTAL 2 TIMES DAILY
Qty: 210 ML | Refills: 0 | Status: SHIPPED | OUTPATIENT
Start: 2018-10-03 | End: 2018-10-10

## 2018-10-03 RX ORDER — LIDOCAINE HYDROCHLORIDE AND EPINEPHRINE 10; 10 MG/ML; UG/ML
INJECTION, SOLUTION INFILTRATION; PERINEURAL PRN
Status: DISCONTINUED | OUTPATIENT
Start: 2018-10-03 | End: 2018-10-03 | Stop reason: HOSPADM

## 2018-10-03 RX ORDER — HYDROCODONE BITARTRATE AND ACETAMINOPHEN 5; 325 MG/1; MG/1
1-2 TABLET ORAL EVERY 4 HOURS PRN
Qty: 7 TABLET | Refills: 0 | Status: SHIPPED | OUTPATIENT
Start: 2018-10-03 | End: 2018-10-09

## 2018-10-03 RX ORDER — FENTANYL CITRATE 50 UG/ML
INJECTION, SOLUTION INTRAMUSCULAR; INTRAVENOUS PRN
Status: DISCONTINUED | OUTPATIENT
Start: 2018-10-03 | End: 2018-10-03

## 2018-10-03 RX ORDER — FENTANYL CITRATE 50 UG/ML
25-50 INJECTION, SOLUTION INTRAMUSCULAR; INTRAVENOUS EVERY 5 MIN PRN
Status: DISCONTINUED | OUTPATIENT
Start: 2018-10-03 | End: 2018-10-03 | Stop reason: HOSPADM

## 2018-10-03 RX ORDER — LIDOCAINE HYDROCHLORIDE 20 MG/ML
INJECTION, SOLUTION INFILTRATION; PERINEURAL PRN
Status: DISCONTINUED | OUTPATIENT
Start: 2018-10-03 | End: 2018-10-03

## 2018-10-03 RX ORDER — HYDROCODONE BITARTRATE AND ACETAMINOPHEN 5; 325 MG/1; MG/1
1 TABLET ORAL
Status: COMPLETED | OUTPATIENT
Start: 2018-10-03 | End: 2018-10-03

## 2018-10-03 RX ORDER — ONDANSETRON 2 MG/ML
INJECTION INTRAMUSCULAR; INTRAVENOUS PRN
Status: DISCONTINUED | OUTPATIENT
Start: 2018-10-03 | End: 2018-10-03

## 2018-10-03 RX ORDER — SODIUM CHLORIDE, SODIUM LACTATE, POTASSIUM CHLORIDE, CALCIUM CHLORIDE 600; 310; 30; 20 MG/100ML; MG/100ML; MG/100ML; MG/100ML
INJECTION, SOLUTION INTRAVENOUS CONTINUOUS PRN
Status: DISCONTINUED | OUTPATIENT
Start: 2018-10-03 | End: 2018-10-03

## 2018-10-03 RX ORDER — PROPOFOL 10 MG/ML
INJECTION, EMULSION INTRAVENOUS PRN
Status: DISCONTINUED | OUTPATIENT
Start: 2018-10-03 | End: 2018-10-03

## 2018-10-03 RX ORDER — ONDANSETRON 2 MG/ML
4 INJECTION INTRAMUSCULAR; INTRAVENOUS EVERY 30 MIN PRN
Status: DISCONTINUED | OUTPATIENT
Start: 2018-10-03 | End: 2018-10-03 | Stop reason: HOSPADM

## 2018-10-03 RX ORDER — HYDROMORPHONE HYDROCHLORIDE 1 MG/ML
.3-.5 INJECTION, SOLUTION INTRAMUSCULAR; INTRAVENOUS; SUBCUTANEOUS EVERY 10 MIN PRN
Status: DISCONTINUED | OUTPATIENT
Start: 2018-10-03 | End: 2018-10-03 | Stop reason: HOSPADM

## 2018-10-03 RX ORDER — ONDANSETRON 4 MG/1
4 TABLET, ORALLY DISINTEGRATING ORAL EVERY 30 MIN PRN
Status: DISCONTINUED | OUTPATIENT
Start: 2018-10-03 | End: 2018-10-03 | Stop reason: HOSPADM

## 2018-10-03 RX ORDER — ALBUTEROL SULFATE 0.83 MG/ML
3 SOLUTION RESPIRATORY (INHALATION)
Status: DISCONTINUED | OUTPATIENT
Start: 2018-10-03 | End: 2018-10-03 | Stop reason: HOSPADM

## 2018-10-03 RX ADMIN — ONDANSETRON 4 MG: 2 INJECTION INTRAMUSCULAR; INTRAVENOUS at 08:30

## 2018-10-03 RX ADMIN — ALBUTEROL SULFATE 2.5 MG: 2.5 SOLUTION RESPIRATORY (INHALATION) at 07:39

## 2018-10-03 RX ADMIN — Medication 0.4 MG: at 09:13

## 2018-10-03 RX ADMIN — PROPOFOL 50 MG: 10 INJECTION, EMULSION INTRAVENOUS at 08:07

## 2018-10-03 RX ADMIN — PROPOFOL 200 MG: 10 INJECTION, EMULSION INTRAVENOUS at 08:03

## 2018-10-03 RX ADMIN — DEXAMETHASONE SODIUM PHOSPHATE 6 MG: 4 INJECTION, SOLUTION INTRA-ARTICULAR; INTRALESIONAL; INTRAMUSCULAR; INTRAVENOUS; SOFT TISSUE at 08:07

## 2018-10-03 RX ADMIN — PHENYLEPHRINE HYDROCHLORIDE 100 MCG: 10 INJECTION, SOLUTION INTRAMUSCULAR; INTRAVENOUS; SUBCUTANEOUS at 08:16

## 2018-10-03 RX ADMIN — MIDAZOLAM 1 MG: 1 INJECTION INTRAMUSCULAR; INTRAVENOUS at 07:57

## 2018-10-03 RX ADMIN — PROPOFOL 50 MG: 10 INJECTION, EMULSION INTRAVENOUS at 08:09

## 2018-10-03 RX ADMIN — SUGAMMADEX 200 MG: 100 INJECTION, SOLUTION INTRAVENOUS at 08:35

## 2018-10-03 RX ADMIN — LIDOCAINE HYDROCHLORIDE 100 MG: 20 INJECTION, SOLUTION INFILTRATION; PERINEURAL at 08:03

## 2018-10-03 RX ADMIN — PHENYLEPHRINE HYDROCHLORIDE 100 MCG: 10 INJECTION, SOLUTION INTRAMUSCULAR; INTRAVENOUS; SUBCUTANEOUS at 08:11

## 2018-10-03 RX ADMIN — SODIUM CHLORIDE, POTASSIUM CHLORIDE, SODIUM LACTATE AND CALCIUM CHLORIDE: 600; 310; 30; 20 INJECTION, SOLUTION INTRAVENOUS at 07:55

## 2018-10-03 RX ADMIN — PHENYLEPHRINE HYDROCHLORIDE 100 MCG: 10 INJECTION, SOLUTION INTRAMUSCULAR; INTRAVENOUS; SUBCUTANEOUS at 08:14

## 2018-10-03 RX ADMIN — MIDAZOLAM 1 MG: 1 INJECTION INTRAMUSCULAR; INTRAVENOUS at 07:55

## 2018-10-03 RX ADMIN — Medication: at 10:44

## 2018-10-03 RX ADMIN — PHENYLEPHRINE HYDROCHLORIDE 100 MCG: 10 INJECTION, SOLUTION INTRAMUSCULAR; INTRAVENOUS; SUBCUTANEOUS at 08:27

## 2018-10-03 RX ADMIN — FENTANYL CITRATE 50 MCG: 50 INJECTION, SOLUTION INTRAMUSCULAR; INTRAVENOUS at 08:03

## 2018-10-03 RX ADMIN — HYDROCODONE BITARTRATE AND ACETAMINOPHEN 1 TABLET: 5; 325 TABLET ORAL at 09:17

## 2018-10-03 RX ADMIN — ROCURONIUM BROMIDE 60 MG: 10 INJECTION INTRAVENOUS at 08:05

## 2018-10-03 ASSESSMENT — LIFESTYLE VARIABLES: TOBACCO_USE: 1

## 2018-10-03 NOTE — OP NOTE
Procedure Date: 10/03/2018      DATE OF SURGERY: 10/03/2018.      PRIMARY SURGEON:  Morro Mijares MD      RESIDENT SURGEON:  Ronaldo Francisco MD      PREOPERATIVE DIAGNOSIS:  Left retromolar trigone chronic ulcer.      POSTOPERATIVE DIAGNOSIS:  Left retromolar trigone chronic ulcer.      PROCEDURES:  Excision of left retromolar trigone ulcer.      ANESTHESIA:  General.      ESTIMATED BLOOD LOSS:  2 mL.      COMPLICATIONS:  None.      IMPLANTS:  None.      SPECIMENS:  Left cheek ulcer, deep.      INTRAOPERATIVE FINDINGS:  Approximately 1 x 1 cm, chronic appearing ulcer of the left retromolar trigone.  This was excised with the Bovie and sent to pathology for further analysis.  The tissue was not particularly friable and was not stuck to any surrounding tissue.  There appeared to be no invasion of the mandible.      INDICATIONS FOR PROCEDURE:  Ms. Ramirez is a 48-year-old female who presented to the ENT Clinic with chronic oral ulcers that were quite painful.  Multiple conservative attempts were trialled in the clinic, but she continued to have the ulcer persist.  Therefore, it was recommended that she be taken to the operating room for excision for definitive management and pathology.  After discussion of risks, benefits and alternatives, she elected to proceed.      DESCRIPTION OF PROCEDURE:  The patient was met in the preoperative area.  Informed consent was obtained.  The patient was then brought back to the operating room and placed supine on the operating table.  General anesthesia was induced and maintained via endotracheal tube without difficulty.  The patient was prepped and draped in a normal clean fashion.  Timeout was performed stating patient name, procedure, and correct surgical site.  The tongue and cheek were then retracted with the [place] and [place] retractors, and the left retromolar trigone ulcer was exposed.  The Bovie was used on a cut setting to outline the extent of the excision.  A  small cuff of normal mucosa was left around the ulcerative lesion.  The mucosa was incised on a cut setting.  The entire ulcer was then removed with the Bovie on a coag setting.  This was then sent to pathology for further analysis.  The surgical wound was then copiously irrigated with saline and suctioned.  Hemostasis was achieved with the bipolar.  3-0 Vicryl was then used to reapproximate the deep tissues in a simple buried interrupted fashion.  A 3-0 chromic was then used in a horizontal mattress fashion to reapproximate the mucosa.        We then removed the retractors and examined the right side.  There was some scar tissue evident along the right buccal mucosa and a very small, 2 mm ulcer along the inferior aspect of the right buccal mucosa that appeared to be healing.  There were no lesions or masses of the tongue that required any excision.  This concluded the procedure.  The patient tolerated the procedure and anesthesia without difficulty.  There were no acute complications.  The patient was then turned over to Anesthesia for awakening, extubated in the OR, and transferred to the PACU in stable condition.      Dr. Hair was present for the entire procedure.      DISPOSITION:  To PACU with plans to discharge home, pending appropriate patient recovery and condition.         MICHAEL HAIR MD       As dictated by JULIANN FUENTES MD            D: 10/03/2018   T: 10/03/2018   MT: [id number]      Name:     JAYA MATHEW   MRN:      [id number]        Account:        [id number]   :      1970           Procedure Date: 10/03/2018      Document: [id number]

## 2018-10-03 NOTE — ANESTHESIA POSTPROCEDURE EVALUATION
Patient: Zayra Ramirez    Procedure(s):  Wide Local Excision Of of Left Oral Cavity Ulcer - Wound Class: II-Clean Contaminated    Diagnosis:Oral Ulceration  Diagnosis Additional Information: No value filed.    Anesthesia Type:  General, ETT    Note:  Anesthesia Post Evaluation    Patient location during evaluation: PACU  Patient participation: Able to fully participate in evaluation  Level of consciousness: awake and alert  Pain management: satisfactory to patient  Airway patency: patent  Cardiovascular status: acceptable and stable  Respiratory status: acceptable and spontaneous ventilation  Hydration status: euvolemic  PONV: controlled     Anesthetic complications: None          Last vitals:  Vitals:    10/03/18 0900 10/03/18 0915 10/03/18 0930   BP: 104/67 99/71 115/72   Resp: 18 20 18   Temp:   36.9  C (98.5  F)   SpO2: (!) 88% (!) 87% 94%         Electronically Signed By: Lincoln Robin MD  October 3, 2018  9:47 AM

## 2018-10-03 NOTE — OR NURSING
Pt uses O2 at home 3 L while sleeping and 1-3L while awake. While in preop pt needed 3L. Writer encouraged pt to have someone bring her travel O2 tank to the hospital for discharge. Cheryl Spears, states she will work on it and if unable to there is a tank in the car but needs a regulator to open and a NC to use. Dr. Robin aware of situation.

## 2018-10-03 NOTE — ANESTHESIA CARE TRANSFER NOTE
Patient: Zayra Ramirez    Procedure(s):  Wide Local Excision Of of Left Oral Cavity Ulcer - Wound Class: II-Clean Contaminated    Diagnosis: Oral Ulceration  Diagnosis Additional Information: No value filed.    Anesthesia Type:   General, ETT     Note:  Airway :Nasal Cannula  Patient transferred to:PACU  Comments: Aao, vss still coughing but improvedHandoff Report: Identifed the Patient, Identified the Reponsible Provider, Reviewed the pertinent medical history, Discussed the surgical course, Reviewed Intra-OP anesthesia mangement and issues during anesthesia, Set expectations for post-procedure period and Allowed opportunity for questions and acknowledgement of understanding      Vitals: (Last set prior to Anesthesia Care Transfer)    CRNA VITALS  10/3/2018 0813 - 10/3/2018 0855      10/3/2018             EKG: Sinus rhythm                Electronically Signed By: NICKO Lopez CRNA  October 3, 2018  8:55 AM

## 2018-10-03 NOTE — IP AVS SNAPSHOT
MRN:[id number]                      After Visit Summary   10/3/2018    Zayra Ramirez    MRN: [id number]           Thank you!     Thank you for choosing [facility] for your care. Our goal is always to provide you with excellent care. Hearing back from our patients is one way we can continue to improve our services. Please take a few minutes to complete the written survey that you may receive in the mail after you visit with us. Thank you!        Patient Information     Date Of Birth          1970        About your hospital stay     You were admitted on:  October 3, 2018 You last received care in the:  [facility]    You were discharged on:  October 3, 2018       Who to Call     For medical emergencies, please call 911.  For non-urgent questions about your medical care, please call your primary care provider or clinic, [phone]  For questions related to your surgery, please call your surgery clinic        Attending Provider     Provider Specialty    Morro Mijares MD Otolaryngology       Primary Care Provider Office Phone # Fax #    Aime Mason [phone]      After Care Instructions     Diet Instructions       Resume pre procedure diet            Discharge Instructions       Patient to follow up with surgeon in 10-14 days            Discharge Instructions - Lifting restrictions       Lifting Restrictions 10 pounds until seen at Post-op follow up appointment            No Alcohol       For 24 hours following procedure            No driving or operating machinery       until the day after procedure            Wound care       Use Peridex rinses twice daily for 7 days. Sutures on the inside of the mouth will dissolve on their own.                  Your next 10 appointments already scheduled     Oct 23, 2018  9:00 AM CDT   (Arrive by 8:45 AM)   Return Visit with Morro Mijares MD   [facility] ([facility]  [place])    [address]  4th Floor  [facility] [address] [phone]              Further instructions from your care team       [facility] [place]  Take it easy when you get home.  Remember, same day surgery DOES NOT MEAN SAME DAY RECOVERY! Healing is a gradual process.   You will need some time to recover- you may be more tired than you realize at first.  Rest and relax for at least the first 24 hours at home.  You'll feel better and heal faster if you take good care of yourself.   Same-Day Surgery   Adult Discharge Orders & Instructions     For 24 hours after surgery    1. Get plenty of rest.  A responsible adult must stay with you for at least 24 hours after you leave the hospital.   2. Do not drive or use heavy equipment.  If you have weakness or tingling, don't drive or use heavy equipment until this feeling goes away.  3. Do not drink alcohol.  4. Avoid strenuous or risky activities.  Ask for help when climbing stairs.   5. You may feel lightheaded.  IF so, sit for a few minutes before standing.  Have someone help you get up.   6. If you have nausea (feel sick to your stomach): Drink only clear liquids such as apple juice, ginger ale, broth or 7-Up.  Rest may also help.  Be sure to drink enough fluids.  Move to a regular diet as you feel able.  7. You may have a slight fever. Call the doctor if your fever is over 100 F (37.7 C) (taken under the tongue) or lasts longer than 24 hours.  8. You may have a dry mouth, a sore throat, muscle aches or trouble sleeping.  These should go away after 24 hours.  9. Do not make important or legal decisions.   Call your doctor for any of the followin.  Signs of infection (fever, growing tenderness at the surgery site, a large amount of drainage or bleeding, severe pain, foul-smelling drainage, redness, swelling).    2. It has been over 8 to 10 hours since surgery and you are still not able to urinate (pass water).    3.   "Headache for over 24 hours.    4.  Numbness, tingling or weakness the day after surgery (if you had spinal anesthesia).  To contact a doctor, call ______Dr Mijares at [phone]____________________ or:        [phone] and ask for the resident on call for   ____ENT resident_____ (answered 24 hours a day)      Emergency Department:    [facility]: [phone]       (TTY for hearing impaired: [phone])           Tips for taking pain medications  To get the best pain relief possible , remember these points:      Take pain medications as directed, before pain becomes severe      Pain medication can upset your stomach: taking it with food may help      Constipation is a common side effect of pain medication. Drink plenty of  Fluids      Eat foods high in fiber. Take a stool softener  if recommended by your doctor or  Pharmacist.        Do not drink alcohol, drive or operate machinery while taking pain medications.      Ask about other ways to control pain, such as with heat, ice or relaxation.          Additional Information     If you use hormonal birth control (such as the pill, patch, ring or implants): You'll need a second form of birth control for 7 days (condoms, a diaphragm or contraceptive foam). While in the hospital, you received a medicine called Bridion. Your normal birth control will not work as well for a week after taking this medicine.          Pending Results     Date and Time Order Name Status Description    10/3/2018 0822 Surgical pathology exam In process             Admission Information     Date & Time Provider Department Dept. Phone    10/3/2018 Morro Mijares MD Same Day Surgery [facility] [phone]      Your Vitals Were     Blood Pressure Temperature Respirations Height Weight Last Period    111/67 98.8  F (37.1  C) (Oral) 16 1.619 m (5' 3.75\") 101.3 kg (223 lb 5.2 oz) (LMP Unknown)    Pulse Oximetry BMI (Body Mass Index)                93% 38.64 kg/m2        " "  MyChart Information     [employer] lets you send messages to your doctor, view your test results, renew your prescriptions, schedule appointments and more. To sign up, go to [email or web address] . Click on \"Log in\" on the left side of the screen, which will take you to the Welcome page. Then click on \"Sign up Now\" on the right side of the page.     You will be asked to enter the access code listed below, as well as some personal information. Please follow the directions to create your username and password.     Your access code is: [email or web address]  Expires: 2019  9:19 AM     Your access code will  in 90 days. If you need help or a new code, please call your [facility] clinic or [phone].        Care EveryWhere ID     This is your Care EveryWhere ID. This could be used by other organizations to access your [facility] medical records  [id number]        Equal Access to Services     BELTRAN MIRELES : [facility], waalicja dominguez, qaybta kaalmacasey vieira, nicholas duran . So [facility] [phone].    ATENCIÓN: Si haylie loredo, tiene a diaz disposición servicios gratuitos de asistencia lingüística. Llame al [phone].    We comply with applicable federal civil rights laws and [place] laws. We do not discriminate on the basis of race, color, national origin, age, disability, sex, sexual orientation, or gender identity.               Review of your medicines      START taking        Dose / Directions    chlorhexidine 0.12 % solution   Commonly known as:  PERIDEX   Used for:  Oral ulcer        Dose:  15 mL   Swish and spit 15 mLs in mouth 2 times daily for 7 days   Quantity:  210 mL   Refills:  0       HYDROcodone-acetaminophen 5-325 MG per tablet   Commonly known as:  NORCO   Used for:  Oral ulcer        Dose:  1-2 tablet   Take 1-2 tablets by mouth every 4 hours as needed (Moderate to Severe Pain)   Quantity:  7 tablet   Refills:  0         CONTINUE these medicines " which have NOT CHANGED        Dose / Directions    ABILIFY PO        Dose:  10 mg   Take 10 mg by mouth daily.   Refills:  0       albuterol 108 (90 Base) MCG/ACT inhaler   Commonly known as:  PROAIR HFA/PROVENTIL HFA/VENTOLIN HFA        Dose:  2 puff   Inhale 2 puffs into the lungs every 6 hours as needed for shortness of breath / dyspnea or wheezing   Refills:  0       DULOXETINE HCL PO        Dose:  120 mg   Take 120 mg by mouth daily   Refills:  0       fluticasone-salmeterol 500-50 MCG/DOSE diskus inhaler   Commonly known as:  ADVAIR        Dose:  1 puff   Inhale 1 puff into the lungs every 12 hours   Refills:  0       KENALOG 10 MG/ML injection   Used for:  Facial cellulitis   Generic drug:  triamcinolone acetonide        Inject 20mg today   Quantity:  20 mL   Refills:  0       lisinopril-hydrochlorothiazide 20-25 MG per tablet   Commonly known as:  PRINZIDE/ZESTORETIC   Used for:  Essential hypertension        Dose:  1 tablet   Take 1 tablet by mouth daily   Quantity:  30 tablet   Refills:  0       NAPROXEN PO        Dose:  500 mg   Take 500 mg by mouth 2 times daily as needed for moderate pain   Refills:  0       OXYGEN-HELIUM IN        2-3L PRN during the day, 3L @ night   Refills:  0       ROPINIROLE HCL PO        Dose:  2 mg   Take 2 mg by mouth nightly as needed   Refills:  0       SINGULAIR 10 MG tablet   Generic drug:  montelukast        Dose:  10 mg   Take 10 mg by mouth At Bedtime   Refills:  0            Where to get your medicines      These medications were sent to [facility] [place], [place] - [address] [address]     Phone:  [phone]     chlorhexidine 0.12 % solution         Some of these will need a paper prescription and others can be bought over the counter. Ask your nurse if you have questions.     Bring a paper prescription for each of these medications     HYDROcodone-acetaminophen 5-325 MG per tablet                Protect  others around you: Learn how to safely use, store and throw away your medicines at www.disposemymeds.org.        Information about OPIOIDS     PRESCRIPTION OPIOIDS: WHAT YOU NEED TO KNOW   We gave you an opioid (narcotic) pain medicine. It is important to manage your pain, but opioids are not always the best choice. You should first try all the other options your care team gave you. Take this medicine for as short a time (and as few doses) as possible.    Some activities can increase your pain, such as bandage changes or therapy sessions. It may help to take your pain medicine 30 to 60 minutes before these activities. Reduce your stress by getting enough sleep, working on hobbies you enjoy and practicing relaxation or meditation. Talk to your care team about ways to manage your pain beyond prescription opioids.    These medicines have risks:    DO NOT drive when on new or higher doses of pain medicine. These medicines can affect your alertness and reaction times, and you could be arrested for driving under the influence (DUI). If you need to use opioids long-term, talk to your care team about driving.    DO NOT operate heavy machinery    DO NOT do any other dangerous activities while taking these medicines.    DO NOT drink any alcohol while taking these medicines.     If the opioid prescribed includes acetaminophen, DO NOT take with any other medicines that contain acetaminophen. Read all labels carefully. Look for the word  acetaminophen  or  Tylenol.  Ask your pharmacist if you have questions or are unsure.    You can get addicted to pain medicines, especially if you have a history of addiction (chemical, alcohol or substance dependence). Talk to your care team about ways to reduce this risk.    All opioids tend to cause constipation. Drink plenty of water and eat foods that have a lot of fiber, such as fruits, vegetables, prune juice, apple juice and high-fiber cereal. Take a laxative (Miralax, milk of magnesia,  Thai Martínez) if you don t move your bowels at least every other day. Other side effects include upset stomach, sleepiness, dizziness, throwing up, tolerance (needing more of the medicine to have the same effect), physical dependence and slowed breathing.    Store your pills in a secure place, locked if possible. We will not replace any lost or stolen medicine. If you don t finish your medicine, please throw away (dispose) as directed by your pharmacist. The [place] Pollution Control Agency has more information about safe disposal: [email or web address]/[email or web address]-medications             Medication List: This is a list of all your medications and when to take them. Check marks below indicate your daily home schedule. Keep this list as a reference.      Medications           Morning Afternoon Evening Bedtime As Needed    ABILIFY PO   Take 10 mg by mouth daily.                                albuterol 108 (90 Base) MCG/ACT inhaler   Commonly known as:  PROAIR HFA/PROVENTIL HFA/VENTOLIN HFA   Inhale 2 puffs into the lungs every 6 hours as needed for shortness of breath / dyspnea or wheezing                                chlorhexidine 0.12 % solution   Commonly known as:  PERIDEX   Swish and spit 15 mLs in mouth 2 times daily for 7 days                                DULOXETINE HCL PO   Take 120 mg by mouth daily                                fluticasone-salmeterol 500-50 MCG/DOSE diskus inhaler   Commonly known as:  ADVAIR   Inhale 1 puff into the lungs every 12 hours                                HYDROcodone-acetaminophen 5-325 MG per tablet   Commonly known as:  NORCO   Take 1-2 tablets by mouth every 4 hours as needed (Moderate to Severe Pain)   Last time this was given:  1 tablet on 10/3/2018  9:17 AM            One pill at 0930 today                         KENALOG 10 MG/ML injection   Inject 20mg today   Generic drug:  triamcinolone acetonide                                 lisinopril-hydrochlorothiazide 20-25 MG per tablet   Commonly known as:  PRINZIDE/ZESTORETIC   Take 1 tablet by mouth daily                                NAPROXEN PO   Take 500 mg by mouth 2 times daily as needed for moderate pain                                OXYGEN-HELIUM IN   2-3L PRN during the day, 3L @ night                                ROPINIROLE HCL PO   Take 2 mg by mouth nightly as needed                                SINGULAIR 10 MG tablet   Take 10 mg by mouth At Bedtime   Generic drug:  montelukast

## 2018-10-03 NOTE — BRIEF OP NOTE
[facility], [place]    Brief Operative Note    Pre-operative diagnosis: Oral Ulceration  Post-operative diagnosis * No post-op diagnosis entered *  Procedure: Procedure(s):  Wide Local Excision Of of Left Oral Cavity Ulcer - Wound Class: II-Clean Contaminated  Surgeon: Surgeon(s) and Role:     * Morro Mijares MD - Primary     * Ronaldo Francisco MD - Resident - Assisting  Anesthesia: General   Estimated blood loss: Minimal  Drains: None  Specimens:   ID Type Source Tests Collected by Time Destination   A : left cheek ulcer deep Tissue Mouth SURGICAL PATHOLOGY EXAM Morro Mijares MD 10/3/2018  8:22 AM      Findings:   1 cm ulcerative lesion consistent with benign lesion excised and sent to pathology for analysis..  Complications: None.  Implants: None.

## 2018-10-03 NOTE — IP AVS SNAPSHOT
Same Day Surgery [address] [address]    Phone:  [phone]                                       After Visit Summary   10/3/2018    Zayra Ramirez    MRN: [id number]           After Visit Summary Signature Page     I have received my discharge instructions, and my questions have been answered. I have discussed any challenges I see with this plan with the nurse or doctor.    ..........................................................................................................................................  Patient/Patient Representative Signature      ..........................................................................................................................................  Patient Representative Print Name and Relationship to Patient    ..................................................               ................................................  Date                                   Time    ..........................................................................................................................................  Reviewed by Signature/Title    ...................................................              ..............................................  Date                                               Time          22EPIC Rev 08/18

## 2018-10-03 NOTE — ANESTHESIA PREPROCEDURE EVALUATION
Anesthesia Evaluation     . Pt has had prior anesthetic. Type: General    No history of anesthetic complications          ROS/MED HX    ENT/Pulmonary: Comment: Mouth ulcers, interstitial lung disease on home oxygen. 3 L at night and 1-3 L as needed throughout the day with activity    (+)sleep apnea, tobacco use, asthma Treatment: Inhaler prn and Inhaler daily,  uses CPAP , . .    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 : . . . :. .       METS/Exercise Tolerance:     Hematologic:  - neg hematologic  ROS       Musculoskeletal:  - neg musculoskeletal ROS       GI/Hepatic:     (+) GERD hiatal hernia (esophageal stricture),       Renal/Genitourinary:  - ROS Renal section negative       Endo:     (+) Obesity, .      Psychiatric:     (+) psychiatric history other (comment) (borderline personality)      Infectious Disease:  - neg infectious disease ROS       Malignancy:      - no malignancy   Other:    (+) H/O Chronic Pain,                   Physical Exam  Normal systems: cardiovascular and dental    Airway   Mallampati: III  TM distance: <3 FB  Neck ROM: full    Dental     Cardiovascular   Rhythm and rate: regular and normal      Pulmonary (+) wheezes                       Anesthesia Plan      History & Physical Review  History and physical reviewed and following examination; no interval change.    ASA Status:  3 .    NPO Status:  > 8 hours    Plan for General and ETT with Intravenous induction. Maintenance will be Balanced.    PONV prophylaxis:  Ondansetron (or other 5HT-3) and Dexamethasone or Solumedrol       Postoperative Care  Postoperative pain management:  IV analgesics, Oral pain medications and Multi-modal analgesia.      Consents  Anesthetic plan, risks, benefits and alternatives discussed with:  Patient and Parent (Mother and/or Father)..                          .

## 2018-10-03 NOTE — OR NURSING
Discharge instructions to pt and responsible adult.  All questions regarding discharge information answered.  Pt and responsible adult verbalized understanding of all discharge instruction information given.  Pt does home oxygen 3 liters - able to tolerate wean to 3 liters now with oxygen saturation 92% or greater.

## 2018-10-03 NOTE — DISCHARGE INSTRUCTIONS
[facility], [place]  Take it easy when you get home.  Remember, same day surgery DOES NOT MEAN SAME DAY RECOVERY! Healing is a gradual process.   You will need some time to recover- you may be more tired than you realize at first.  Rest and relax for at least the first 24 hours at home.  You'll feel better and heal faster if you take good care of yourself.   Same-Day Surgery   Adult Discharge Orders & Instructions     For 24 hours after surgery    1. Get plenty of rest.  A responsible adult must stay with you for at least 24 hours after you leave the hospital.   2. Do not drive or use heavy equipment.  If you have weakness or tingling, don't drive or use heavy equipment until this feeling goes away.  3. Do not drink alcohol.  4. Avoid strenuous or risky activities.  Ask for help when climbing stairs.   5. You may feel lightheaded.  IF so, sit for a few minutes before standing.  Have someone help you get up.   6. If you have nausea (feel sick to your stomach): Drink only clear liquids such as apple juice, ginger ale, broth or 7-Up.  Rest may also help.  Be sure to drink enough fluids.  Move to a regular diet as you feel able.  7. You may have a slight fever. Call the doctor if your fever is over 100 F (37.7 C) (taken under the tongue) or lasts longer than 24 hours.  8. You may have a dry mouth, a sore throat, muscle aches or trouble sleeping.  These should go away after 24 hours.  9. Do not make important or legal decisions.   Call your doctor for any of the followin.  Signs of infection (fever, growing tenderness at the surgery site, a large amount of drainage or bleeding, severe pain, foul-smelling drainage, redness, swelling).    2. It has been over 8 to 10 hours since surgery and you are still not able to urinate (pass water).    3.  Headache for over 24 hours.    4.  Numbness, tingling or weakness the day after surgery (if you had spinal anesthesia).  To contact a doctor, call  ______Dr Mijares at [phone]____________________ or:        [phone] and ask for the resident on call for   ____ENT resident_____ (answered 24 hours a day)      Emergency Department:    [facility]: [phone]       (TTY for hearing impaired: [phone])           Tips for taking pain medications  To get the best pain relief possible , remember these points:      Take pain medications as directed, before pain becomes severe      Pain medication can upset your stomach: taking it with food may help      Constipation is a common side effect of pain medication. Drink plenty of  Fluids      Eat foods high in fiber. Take a stool softener  if recommended by your doctor or  Pharmacist.        Do not drink alcohol, drive or operate machinery while taking pain medications.      Ask about other ways to control pain, such as with heat, ice or relaxation.

## 2018-10-05 LAB — COPATH REPORT: NORMAL

## 2018-10-09 ENCOUNTER — TELEPHONE (OUTPATIENT)
Dept: OTOLARYNGOLOGY | Facility: CLINIC | Age: 48
End: 2018-10-09

## 2018-10-09 DIAGNOSIS — K12.1 ORAL ULCER: ICD-10-CM

## 2018-10-09 RX ORDER — HYDROCODONE BITARTRATE AND ACETAMINOPHEN 5; 325 MG/1; MG/1
1-2 TABLET ORAL EVERY 4 HOURS PRN
Qty: 7 TABLET | Refills: 0 | Status: ON HOLD | OUTPATIENT
Start: 2018-10-09 | End: 2019-08-19

## 2018-10-09 NOTE — TELEPHONE ENCOUNTER
"[facility] Call Center    Phone Message    May a detailed message be left on voicemail: yes    Reason for Call: Symptoms or Concerns     If patient has red-flag symptoms, warm transfer to triage line    Current symptom or concern: \"Enormous amount of pain\" after ulcer on cheek removed last Wednesday 10/3.  Reports she is using the mouthwash.  Her Vicodin is gone.  Can't sleep, can't eat.    Symptoms have been present for:  1 week(s), worse since Vicodin gone.    Has patient previously been seen for this? Yes    By Dr. Mijares: did \"Wide Local Excision Of of Left Oral Cavity Ulcer\"    Date: 10/3/18    Are there any new or worsening symptoms? Yes: as above    Return to clinic appointment 10/23.  States she's unable to wait that long.  [facility] in [place] as per EMR preferred pharmacy.  Could come and  Rx if needs to be printed.      Action Taken: Message routed to:  [facility] ENT  "

## 2018-10-09 NOTE — TELEPHONE ENCOUNTER
RN calling pt to discuss symptoms of post-op pain.  Pt underwent excision of oral ulcer with Dr. Mijares on 10/3/18.  Pt states that she ran out of her Norco tablets yesterday and has been having constant, throbbing pain in the surgical area.  Pt discussed that the Norco had also been making her itchy.  RN recommended using benadryl to help with the itching sensation.  Pt should also alternate Ibuprofen and regular tylenol to help with pain control.  RN verified that Dr. Mijares is comfortable providing a refill and discussed the situation with Dr. Nissen who is in the clinic.  The script was hand delivered to the [facility] pharmacy where the pt indicated she would like to  the medication.    Shara LOOMISN, RN  [phone]  [facility] ENT   Head & Neck Surgery   10/9/2018 2:45 PM

## 2018-10-23 ENCOUNTER — THERAPY VISIT (OUTPATIENT)
Dept: SPEECH THERAPY | Facility: CLINIC | Age: 48
End: 2018-10-23
Payer: COMMERCIAL

## 2018-10-23 ENCOUNTER — OFFICE VISIT (OUTPATIENT)
Dept: OTOLARYNGOLOGY | Facility: CLINIC | Age: 48
End: 2018-10-23
Payer: COMMERCIAL

## 2018-10-23 VITALS — HEIGHT: 63 IN | BODY MASS INDEX: 37.92 KG/M2 | WEIGHT: 214 LBS

## 2018-10-23 DIAGNOSIS — K12.0 APHTHOUS ULCER OF MOUTH: Primary | ICD-10-CM

## 2018-10-23 DIAGNOSIS — R13.12 OROPHARYNGEAL DYSPHAGIA: Primary | ICD-10-CM

## 2018-10-23 ASSESSMENT — PAIN SCALES - GENERAL: PAINLEVEL: NO PAIN (0)

## 2018-10-23 NOTE — LETTER
10/23/2018       RE: Zayra Ramirez  [address] [place] [address]     Dear Colleague,    Thank you for referring your patient, Zayra Ramirez, to the [facility] EAR NOSE AND THROAT at [facility]. Please see a copy of my visit note below.    HISTORY OF PRESENT ILLNESS:  The patient is here for follow up.  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   We had to remove one of these that would not heal.  She is about two and a half or three weeks out from her resection.  She feels a lot better now, and it is healing finally.  The pathology on this was acute chronic inflammation.  No cancer or anything of this nature.  The patient was informed of this today.      PHYSICAL EXAMINATION:  The patient is alert, oriented x3 and pleasant.  Skin of the face, lips, and neck on her is quite normal.  Oral cavity and oropharynx is clear except there is one area that is healing over the left buccal mucosa posteriorly in the gingival buccal sulcus on that side.  It seems to be about 50% healed at present.  No other masses or abnormalities are noted.      ASSESSMENT:  Patient with a history of oral cavity ulcer.  She is doing well.      PLAN:  We will see her again over the course of the next four weeks or so.         Again, thank you for allowing me to participate in the care of your patient.      Sincerely,    Morro Mijares MD

## 2018-10-23 NOTE — MR AVS SNAPSHOT
After Visit Summary   10/23/2018    Zayra Ramirez    MRN: [id number]           Patient Information     Date Of Birth          1970        Visit Information        Provider Department      10/23/2018 9:00 AM Morro Mijares MD M [facility] Ear Nose and Throat        Care Instructions    1.  You were seen in the ENT Clinic today by Dr. Mijares.  If you have any questions or concerns after your appointment, please call [phone].  Press option #1 for scheduling related needs.  Press option #3 for Nurse advice.  2.  Plan is to return to clinic in 1 month follow up with Dr. Mijares for oral cavity check.      Shara LOOMISN, RN  [facility] ENT   Head & Neck Surgery                 Follow-ups after your visit        Follow-up notes from your care team     Return in about 4 weeks (around 11/20/2018).      Your next 10 appointments already scheduled     Nov 27, 2018 10:00 AM CST   (Arrive by 9:45 AM)   Return Visit with MD ROBER Kim [facility] Ear Nose and Throat ([facility] and [facility] [place])    [address] [place] [address] [phone]              Who to contact     Please call your clinic at [phone] to:    Ask questions about your health    Make or cancel appointments    Discuss your medicines    Learn about your test results    Speak to your doctor            Additional Information About Your Visit        MyChart Information     [employer] is an electronic gateway that provides easy, online access to your medical records. With [employer], you can request a clinic appointment, read your test results, renew a prescription or communicate with your care team.     To sign up for Red Gurut visit the website at [email or web address]   You will be asked to enter the access code listed below, as well as some personal information. Please follow the directions to create your username and password.     Your access code is:  [email or web address]  Expires: 2019  9:19 AM     Your access code will  in 90 days. If you need help or a new code, please contact your [facility] or call [phone] for assistance.        Care EveryWhere ID     This is your Care EveryWhere ID. This could be used by other organizations to access your [facility] medical records  [id number]        Your Vitals Were     Height Last Period BMI (Body Mass Index)             1.6 m (5' 3\") (LMP Unknown) 37.91 kg/m2          Blood Pressure from Last 3 Encounters:   10/03/18 112/82   18 122/80   18 141/83    Weight from Last 3 Encounters:   10/23/18 97.1 kg (214 lb)   10/03/18 101.3 kg (223 lb 5.2 oz)   18 105.2 kg (232 lb)              Today, you had the following     No orders found for display       Primary Care Provider Office Phone # Fax #    Aime CIFUENTES DO Marlon [phone]       [facility] [address] [place] [address]        Equal Access to Services     [facility]: Hadii aad ku hadasho Soomaali, waaxda luqadaha, qaybta kaalmada adeegyada, waxay idiin hayaan adesuri rachelaraskyler laosmin . So [facility] [phone].    ATENCIÓN: Si habla español, tiene a diaz disposición servicios gratuitos de asistencia lingüística. Llame al [phone].    We comply with applicable federal civil rights laws and [place] laws. We do not discriminate on the basis of race, color, national origin, age, disability, sex, sexual orientation, or gender identity.            Thank you!     Thank you for choosing [facility] EAR NOSE AND THROAT  for your care. Our goal is always to provide you with excellent care. Hearing back from our patients is one way we can continue to improve our services. Please take a few minutes to complete the written survey that you may receive in the mail after your visit with us. Thank you!             Your Updated Medication List - Protect others around you: Learn how to safely use, store and throw " away your medicines at www.disposemymeds.org.          This list is accurate as of 10/23/18  9:20 AM.  Always use your most recent med list.                   Brand Name Dispense Instructions for use Diagnosis    ABILIFY PO      Take 10 mg by mouth daily.        albuterol 108 (90 Base) MCG/ACT inhaler    PROAIR HFA/PROVENTIL HFA/VENTOLIN HFA     Inhale 2 puffs into the lungs every 6 hours as needed for shortness of breath / dyspnea or wheezing        DULOXETINE HCL PO      Take 120 mg by mouth daily        fluticasone-salmeterol 500-50 MCG/DOSE diskus inhaler    ADVAIR     Inhale 1 puff into the lungs every 12 hours        HYDROcodone-acetaminophen 5-325 MG per tablet    NORCO    7 tablet    Take 1-2 tablets by mouth every 4 hours as needed (Moderate to Severe Pain)    Oral ulcer       KENALOG 10 MG/ML injection   Generic drug:  triamcinolone acetonide     20 mL    Inject 20mg today    Facial cellulitis       lisinopril-hydrochlorothiazide 20-25 MG per tablet    PRINZIDE/ZESTORETIC    30 tablet    Take 1 tablet by mouth daily    Essential hypertension       NAPROXEN PO      Take 500 mg by mouth 2 times daily as needed for moderate pain        OXYGEN-HELIUM IN      2-3L PRN during the day, 3L @ night        ROPINIROLE HCL PO      Take 2 mg by mouth nightly as needed        SINGULAIR 10 MG tablet   Generic drug:  montelukast      Take 10 mg by mouth At Bedtime

## 2018-10-23 NOTE — MR AVS SNAPSHOT
After Visit Summary   10/23/2018    Zayra Ramirez    MRN: [id number]           Patient Information     Date Of Birth          1970        Visit Information        Provider Department      10/23/2018 9:00 AM Marley Voss SLP M [facility]        Today's Diagnoses     Oropharyngeal dysphagia    -  1       Follow-ups after your visit        Additional Services     SPEECH THERAPY REFERRAL       If you have not heard from the scheduling office within 2 business days, please call [phone] for all locations, with the exception of [place], please call [phone] and [place], please call [phone].    Please be aware that coverage of these services is subject to the terms and limitations of your health insurance plan.  Call member services at your health plan with any benefit or coverage questions.                  Your next 10 appointments already scheduled     Nov 27, 2018 10:00 AM CST   (Arrive by 9:45 AM)   Return Visit with MD ROBER Kim [facility] Ear Nose and Throat ([email or web address] and Surgery [place])    [address] [place] [address] [phone]              Who to contact     Please call your clinic at [phone] to:    Ask questions about your health    Make or cancel appointments    Discuss your medicines    Learn about your test results    Speak to your doctor            Additional Information About Your Visit        MyChart Information     THE [employer]ARD[employer] LAD[employer]t is an electronic gateway that provides easy, online access to your medical records. With "[employer]", you can request a clinic appointment, read your test results, renew a prescription or communicate with your care team.     To sign up for THE BEARDED LADYt visit the website at [email or web address]   You will be asked to enter the access code listed below, as well as some personal information. Please follow the directions to create your username and password.     Your access code is:  [email or web address]  Expires: 2019  9:19 AM     Your access code will  in 90 days. If you need help or a new code, please contact your [facility] or call [phone] for assistance.        Care EveryWhere ID     This is your Care EveryWhere ID. This could be used by other organizations to access your [facility] medical records  [id number]        Your Vitals Were     Last Period                   (LMP Unknown)            Blood Pressure from Last 3 Encounters:   10/03/18 112/82   18 122/80   18 141/83    Weight from Last 3 Encounters:   10/23/18 97.1 kg (214 lb)   10/03/18 101.3 kg (223 lb 5.2 oz)   18 105.2 kg (232 lb)               Primary Care Provider Office Phone # Fax #    Aime Mason  [phone]       [facility] [address] [place] [address]        Equal Access to Services     [facility]EL[employer] [facility]RE[facility] : Hadii aad ku hadasho Soomaali, waaxda luqadaha, qaybta kaalmada adeegyada, waxay idiin hayaan adeeg kharash la'eliotn . So [facility] [phone].    ATENCIÓN: Si habla español, tiene a diaz disposición servicios gratuitos de asistencia lingüística. Llame al [phone].    We comply with applicable federal civil rights laws and [place] laws. We do not discriminate on the basis of race, color, national origin, age, disability, sex, sexual orientation, or gender identity.            Thank you!     Thank you for choosing [facility]  for your care. Our goal is always to provide you with excellent care. Hearing back from our patients is one way we can continue to improve our services. Please take a few minutes to complete the written survey that you may receive in the mail after your visit with us. Thank you!             Your Updated Medication List - Protect others around you: Learn how to safely use, store and throw away your medicines at www.disposemymeds.org.          This list is accurate as of 10/23/18  6:24 PM.  Always use your most  recent med list.                   Brand Name Dispense Instructions for use Diagnosis    ABILIFY PO      Take 10 mg by mouth daily.        albuterol 108 (90 Base) MCG/ACT inhaler    PROAIR HFA/PROVENTIL HFA/VENTOLIN HFA     Inhale 2 puffs into the lungs every 6 hours as needed for shortness of breath / dyspnea or wheezing        DULOXETINE HCL PO      Take 120 mg by mouth daily        fluticasone-salmeterol 500-50 MCG/DOSE diskus inhaler    ADVAIR     Inhale 1 puff into the lungs every 12 hours        HYDROcodone-acetaminophen 5-325 MG per tablet    NORCO    7 tablet    Take 1-2 tablets by mouth every 4 hours as needed (Moderate to Severe Pain)    Oral ulcer       KENALOG 10 MG/ML injection   Generic drug:  triamcinolone acetonide     20 mL    Inject 20mg today    Facial cellulitis       lisinopril-hydrochlorothiazide 20-25 MG per tablet    PRINZIDE/ZESTORETIC    30 tablet    Take 1 tablet by mouth daily    Essential hypertension       NAPROXEN PO      Take 500 mg by mouth 2 times daily as needed for moderate pain        OXYGEN-HELIUM IN      2-3L PRN during the day, 3L @ night        ROPINIROLE HCL PO      Take 2 mg by mouth nightly as needed        SINGULAIR 10 MG tablet   Generic drug:  montelukast      Take 10 mg by mouth At Bedtime

## 2018-10-23 NOTE — PROGRESS NOTES
OUTPATIENT SWALLOW  EVALUATION  PLAN OF TREATMENT FOR OUTPATIENT REHABILITATION  (COMPLETE FOR INITIAL CLAIMS ONLY)  Patient's Last Name, First Name, M.I.  YOB: 1970  Zayra Ramirez     Provider's Name   Marley Voss   Medical Record No.  [id number]     Start of Care Date:  10/23/18   Onset Date:  10/23/18   Type:     ___PT   ____OT  ___X_SLP Medical Diagnosis:   Dysphagia     Treatment Diagnosis:  Mild oropharyngeal dysphagia Visits from SOC:  1     _________________________________________________________________________________  Plan of Treatment/Functional Goals:  Planned Therapy Interventions: Dysphagia Treatment  Dysphagia treatment: Oropharyngeal exercise training, Compensatory strategies for swallowing      Goals   1. Goal Identifier: Diet       Goal Description: 1. Pt will tolerate regular consistency solids and thin liquids without clinical s/sx of aspiration/penetration independently.        Target Date: 01/21/19           2. Goal Identifier: Exercise       Goal Description: 2. Pt will improve ROM and strength of pharynx, jaw and BOT by completing 10 repetitions of 4/4 exercises 3 times daily with minimal written or verbal cues.        Target Date: 01/21/19                          Therapy Frequency: other (see comments)  Predicted Duration of Therapy Intervention (days/wks): 1x/month x 3 months    Marley Voss, SLP       I CERTIFY THE NEED FOR THESE SERVICES FURNISHED UNDER        THIS PLAN OF TREATMENT AND WHILE UNDER MY CARE     (Physician attestation of this document indicates review and certification of the therapy plan).                               Referring Physician: Dr. Morro Mijares    Initial Assessment        See Epic Evaluation Start Of Care Date: 10/23/18

## 2018-10-23 NOTE — PROGRESS NOTES
HISTORY OF PRESENT ILLNESS:  The patient is here for follow up.  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   We had to remove one of these that would not heal.  She is about two and a half or three weeks out from her resection.  She feels a lot better now, and it is healing finally.  The pathology on this was acute chronic inflammation.  No cancer or anything of this nature.  The patient was informed of this today.      PHYSICAL EXAMINATION:  The patient is alert, oriented x3 and pleasant.  Skin of the face, lips, and neck on her is quite normal.  Oral cavity and oropharynx is clear except there is one area that is healing over the left buccal mucosa posteriorly in the gingival buccal sulcus on that side.  It seems to be about 50% healed at present.  No other masses or abnormalities are noted.      ASSESSMENT:  Patient with a history of oral cavity ulcer.  She is doing well.      PLAN:  We will see her again over the course of the next four weeks or so.

## 2018-10-23 NOTE — PATIENT INSTRUCTIONS
1.  You were seen in the ENT Clinic today by Dr. Mijares.  If you have any questions or concerns after your appointment, please call [phone].  Press option #1 for scheduling related needs.  Press option #3 for Nurse advice.  2.  Plan is to return to clinic in 1 month follow up with Dr. Mijares for oral cavity check.      Shara LOOMISN, RN  [facility] ENT   Head & Neck Surgery

## 2018-11-20 ENCOUNTER — TRANSFERRED RECORDS (OUTPATIENT)
Dept: HEALTH INFORMATION MANAGEMENT | Facility: CLINIC | Age: 48
End: 2018-11-20

## 2018-11-26 ENCOUNTER — TRANSFERRED RECORDS (OUTPATIENT)
Dept: HEALTH INFORMATION MANAGEMENT | Facility: CLINIC | Age: 48
End: 2018-11-26

## 2018-11-27 ENCOUNTER — OFFICE VISIT (OUTPATIENT)
Dept: OTOLARYNGOLOGY | Facility: CLINIC | Age: 48
End: 2018-11-27
Payer: COMMERCIAL

## 2018-11-27 VITALS — WEIGHT: 229 LBS | BODY MASS INDEX: 40.57 KG/M2

## 2018-11-27 DIAGNOSIS — M54.2 CERVICALGIA: Primary | ICD-10-CM

## 2018-11-27 ASSESSMENT — PAIN SCALES - GENERAL: PAINLEVEL: SEVERE PAIN (7)

## 2018-11-27 NOTE — PROGRESS NOTES
HISTORY OF PRESENT ILLNESS:  Zayra Ramirez is 48 years of age.  She is here for follow-up today.  She has had bruxism, and she has had smoking.  She has had ulcers in her mouth.  One of them was so chronic and painful for such a long period of time that we had to excise it to make sure it was not cancer.  We did that.  She has been slowly healing.  She thinks it is fully healed at the present time today.      PHYSICAL EXAMINATION:  The patient is alert, oriented x3 and pleasant.  Skin of the face, lips, and neck on her is quite normal otherwise.  Ear canals are clear.  Cranial nerves are intact.  Oral cavity is examined, and there still is a tiny bit of an ulcer that is about 3-4 mm on the left side where she has had bruxism and where she has had early contact and things of this nature.  There are no other masses or lesions seen.  I painted this area with gentian violet today.      ASSESSMENT AND PLAN:  Patient with a history of chronic oral ulcers.  She is just about finally healed up.  She is going to go back and see the [facility] or similar.  She will come back and see me again in six weeks as well to  her final healing.

## 2018-11-27 NOTE — NURSING NOTE
Chief Complaint   Patient presents with     RECHECK     1 month follow up     Weight 103.9 kg (229 lb), not currently breastfeeding.    Silvano Munroe LPN

## 2018-11-27 NOTE — LETTER
11/27/2018       RE: Zayra Ramirez  [address] [place] [address]     Dear Colleague,    Thank you for referring your patient, Zayra Ramirez, to the [facility] EAR NOSE AND THROAT at [facility]. Please see a copy of my visit note below.    HISTORY OF PRESENT ILLNESS:  Zayra Ramirez is 48 years of age.  She is here for follow-up today.  She has had bruxism, and she has had smoking.  She has had ulcers in her mouth.  One of them was so chronic and painful for such a long period of time that we had to excise it to make sure it was not cancer.  We did that.  She has been slowly healing.  She thinks it is fully healed at the present time today.      PHYSICAL EXAMINATION:  The patient is alert, oriented x3 and pleasant.  Skin of the face, lips, and neck on her is quite normal otherwise.  Ear canals are clear.  Cranial nerves are intact.  Oral cavity is examined, and there still is a tiny bit of an ulcer that is about 3-4 mm on the left side where she has had bruxism and where she has had early contact and things of this nature.  There are no other masses or lesions seen.  I painted this area with gentian violet today.      ASSESSMENT AND PLAN:  Patient with a history of chronic oral ulcers.  She is just about finally healed up.  She is going to go back and see the [facility] or similar.  She will come back and see me again in six weeks as well to  her final healing.       Morro Mijares MD

## 2018-11-27 NOTE — PATIENT INSTRUCTIONS
Plan of care:  Follow up with Dr Mijares in 6 weeks  Clinic contact information:  1. To schedule an appointment or to speak to someone regarding your medical care, please call [phone], option #1  2. GREY Vásquez: [phone]  3. Surgery scheduling:      Barbara Hare: [phone]      Izabella Trujillo: [phone]  4. Fax: [phone]  5. [phone]

## 2018-11-27 NOTE — MR AVS SNAPSHOT
After Visit Summary   2018    Zayra Ramirez    MRN: [id number]           Patient Information     Date Of Birth          1970        Visit Information        Provider Department      2018 10:00 AM Morro Mijares MD M [facility] Ear Nose and Throat         Follow-ups after your visit        Follow-up notes from your care team     Return in about 6 weeks (around 2019).      Your next 10 appointments already scheduled     Dao 15, 2019 10:15 AM CST   (Arrive by 10:00 AM)   Return Visit with MD ROBER Kmi [facility] Ear Nose and Throat ([facility] and Surgery [place])    [address] [place] [address] [phone]              Who to contact     Please call your clinic at [phone] to:    Ask questions about your health    Make or cancel appointments    Discuss your medicines    Learn about your test results    Speak to your doctor            Additional Information About Your Visit        MyChart Information     [employer] is an electronic gateway that provides easy, online access to your medical records. With [employer], you can request a clinic appointment, read your test results, renew a prescription or communicate with your care team.     To sign up for [employer] visit the website at [email or web address]   You will be asked to enter the access code listed below, as well as some personal information. Please follow the directions to create your username and password.     Your access code is: [email or web address]  Expires: 2019  8:19 AM     Your access code will  in 90 days. If you need help or a new code, please contact your [facility] or call [phone] for assistance.        Care EveryWhere ID     This is your Care EveryWhere ID. This could be used by other organizations to access your [facility] medical records  [id number]        Your Vitals Were     Last Period BMI (Body Mass Index)                 (LMP Unknown) 40.57 kg/m2           Blood Pressure from Last 3 Encounters:   10/03/18 112/82   05/24/18 122/80   04/26/18 141/83    Weight from Last 3 Encounters:   11/27/18 103.9 kg (229 lb)   10/23/18 97.1 kg (214 lb)   10/03/18 101.3 kg (223 lb 5.2 oz)              Today, you had the following     No orders found for display       Primary Care Provider Office Phone # Fax #    Aime Mason  [phone]       [facility] [address] [place] [address]        Equal Access to Services     [facility]: Hadii aad kaykay hadasho Somateus, waaxda luqadaha, qaybta kaalmada adesuriyada, nicholas duran . So [facility] [phone].    ATENCIÓN: Si habla español, tiene a diaz disposición servicios gratuitos de asistencia lingüística. [facility] [phone].    We comply with applicable federal civil rights laws and [place] laws. We do not discriminate on the basis of race, color, national origin, age, disability, sex, sexual orientation, or gender identity.            Thank you!     Thank you for choosing [facility] EAR NOSE AND THROAT  for your care. Our goal is always to provide you with excellent care. Hearing back from our patients is one way we can continue to improve our services. Please take a few minutes to complete the written survey that you may receive in the mail after your visit with us. Thank you!             Your Updated Medication List - Protect others around you: Learn how to safely use, store and throw away your medicines at www.disposemymeds.org.          This list is accurate as of 11/27/18 10:17 AM.  Always use your most recent med list.                   Brand Name Dispense Instructions for use Diagnosis    ABILIFY PO      Take 10 mg by mouth daily.        albuterol 108 (90 Base) MCG/ACT inhaler    PROAIR HFA/PROVENTIL HFA/VENTOLIN HFA     Inhale 2 puffs into the lungs every 6 hours as needed for shortness of breath / dyspnea or wheezing        DULOXETINE  HCL PO      Take 120 mg by mouth daily        fluticasone-salmeterol 500-50 MCG/DOSE inhaler    ADVAIR     Inhale 1 puff into the lungs every 12 hours        HYDROcodone-acetaminophen 5-325 MG tablet    NORCO    7 tablet    Take 1-2 tablets by mouth every 4 hours as needed (Moderate to Severe Pain)    Oral ulcer       KENALOG 10 MG/ML injection   Generic drug:  triamcinolone acetonide     20 mL    Inject 20mg today    Facial cellulitis       lisinopril-hydrochlorothiazide 20-25 MG per tablet    PRINZIDE/ZESTORETIC    30 tablet    Take 1 tablet by mouth daily    Essential hypertension       NAPROXEN PO      Take 500 mg by mouth 2 times daily as needed for moderate pain        OXYGEN-HELIUM IN      2-3L PRN during the day, 3L @ night        ROPINIROLE HCL PO      Take 2 mg by mouth nightly as needed        SINGULAIR 10 MG tablet   Generic drug:  montelukast      Take 10 mg by mouth At Bedtime

## 2018-12-03 ENCOUNTER — TELEPHONE (OUTPATIENT)
Dept: RHEUMATOLOGY | Facility: CLINIC | Age: 48
End: 2018-12-03

## 2018-12-03 NOTE — TELEPHONE ENCOUNTER
[facility] Call Center    Phone Message    May a detailed message be left on voicemail: yes    Reason for Call: Other: Aime Mason from [facility] referring to Rheumatology for MRI findings, Family history of RA.      Action Taken: Message routed to:  [facility] ([facility] Rheumatology

## 2018-12-07 NOTE — TELEPHONE ENCOUNTER
Referral received from Aime Mason MD at [facility] for Hx of RA and MRI findings.  Ellie Patel CMA  12/7/2018 1:34 PM

## 2018-12-18 NOTE — TELEPHONE ENCOUNTER
General Rheumatology Intake Form    Reason for referral: family history of RA, MRI findings  Referring provider: Aime Mason at [facility]    Past Rheumatologist: No     Have you been diagnosed with Fibromyalgia? Yes years ago    Who manages your care for this issue now? Dr. Mason at [facility]     What is your most urgent concern at this time? Pain in shoulder and left side and arms    Have you seen any specialist related to the reason you are coming here? Yes, orthopedics    Where are we expecting records (labs, imaging or pathology) from? [facility]    Explained to patient that I will give her a call either Friday or Monday to discuss scheduling options. Patient verbalized understanding.     Ellie Patel CMA  12/18/2018 3:09 PM

## 2019-01-03 ASSESSMENT — MIFFLIN-ST. JEOR: SCORE: 1639.77

## 2019-01-04 NOTE — TELEPHONE ENCOUNTER
Spoke with patient and offered an appointment 2/20/2018 at 8:00 am with Dr. Saenz, patient accepted.   Ellie Patel CMA  1/4/2019 10:50 AM

## 2019-01-10 ENCOUNTER — SURGERY - HEALTHEAST (OUTPATIENT)
Dept: SURGERY | Facility: AMBULATORY SURGERY CENTER | Age: 49
End: 2019-01-10

## 2019-01-10 ASSESSMENT — MIFFLIN-ST. JEOR: SCORE: 1627.87

## 2019-01-15 ENCOUNTER — OFFICE VISIT (OUTPATIENT)
Dept: OTOLARYNGOLOGY | Facility: CLINIC | Age: 49
End: 2019-01-15
Payer: COMMERCIAL

## 2019-01-15 VITALS — BODY MASS INDEX: 39.87 KG/M2 | HEIGHT: 63 IN | WEIGHT: 225 LBS

## 2019-01-15 DIAGNOSIS — K12.0 APHTHOUS ULCER OF MOUTH: Primary | ICD-10-CM

## 2019-01-15 ASSESSMENT — PAIN SCALES - GENERAL: PAINLEVEL: SEVERE PAIN (6)

## 2019-01-15 ASSESSMENT — MIFFLIN-ST. JEOR: SCORE: 1624.72

## 2019-01-15 NOTE — LETTER
1/15/2019       RE: Zayra Ramirez  [address] [place] [address]     Dear Colleague,    Thank you for referring your patient, Zayra Ramirez, to the [facility] EAR NOSE AND THROAT at [facility]. Please see a copy of my visit note below.    HISTORY OF PRESENT ILLNESS:  The patient is here for follow-up today.  She is 48.  She had a left-sided chronic ulcer in her mouth that did not heal for months and months.  She is here for final healing today.  She had bruxism.  There were no other problems that were otherwise noted.  It was a relatively large oral ulcer before.  She has no new complaints.      PHYSICAL EXAMINATION:  She is alert, oriented x3 and pleasant.  Skin of the face, lips, and neck on her is otherwise normal.  Her oral cavity is examined, and there is a tiny area that is 1-2 mm that is still present.  I painted it with gentian violet.      ASSESSMENT AND PLAN:  I told her that she can follow up with us on an as-needed basis.  I said the final healing will likely occur.  I told her to come back for any other further problems.  She understands and agrees.         Again, thank you for allowing me to participate in the care of your patient.      Sincerely,    Morro Mijares MD

## 2019-01-15 NOTE — PROGRESS NOTES
HISTORY OF PRESENT ILLNESS:  The patient is here for follow-up today.  She is 48.  She had a left-sided chronic ulcer in her mouth that did not heal for months and months.  She is here for final healing today.  She had bruxism.  There were no other problems that were otherwise noted.  It was a relatively large oral ulcer before.  She has no new complaints.      PHYSICAL EXAMINATION:  She is alert, oriented x3 and pleasant.  Skin of the face, lips, and neck on her is otherwise normal.  Her oral cavity is examined, and there is a tiny area that is 1-2 mm that is still present.  I painted it with gentian violet.      ASSESSMENT AND PLAN:  I told her that she can follow up with us on an as-needed basis.  I said the final healing will likely occur.  I told her to come back for any other further problems.  She understands and agrees.

## 2019-02-20 ENCOUNTER — OFFICE VISIT (OUTPATIENT)
Dept: RHEUMATOLOGY | Facility: CLINIC | Age: 49
End: 2019-02-20
Attending: INTERNAL MEDICINE
Payer: COMMERCIAL

## 2019-02-20 VITALS
SYSTOLIC BLOOD PRESSURE: 131 MMHG | OXYGEN SATURATION: 96 % | HEART RATE: 85 BPM | TEMPERATURE: 98.4 F | HEIGHT: 63 IN | WEIGHT: 234.5 LBS | BODY MASS INDEX: 41.55 KG/M2 | DIASTOLIC BLOOD PRESSURE: 84 MMHG

## 2019-02-20 DIAGNOSIS — Z87.39 HISTORY OF SERONEGATIVE INFLAMMATORY ARTHRITIS: Primary | ICD-10-CM

## 2019-02-20 DIAGNOSIS — M25.512 LEFT SHOULDER PAIN, UNSPECIFIED CHRONICITY: ICD-10-CM

## 2019-02-20 DIAGNOSIS — Z87.39 HISTORY OF SERONEGATIVE INFLAMMATORY ARTHRITIS: ICD-10-CM

## 2019-02-20 LAB
ALBUMIN UR-MCNC: NEGATIVE MG/DL
ALT SERPL W P-5'-P-CCNC: 15 U/L (ref 0–50)
APPEARANCE UR: CLEAR
AST SERPL W P-5'-P-CCNC: 12 U/L (ref 0–45)
BILIRUB UR QL STRIP: NEGATIVE
COLOR UR AUTO: YELLOW
CRP SERPL-MCNC: 12.4 MG/L (ref 0–8)
ERYTHROCYTE [DISTWIDTH] IN BLOOD BY AUTOMATED COUNT: 14.6 % (ref 10–15)
ERYTHROCYTE [SEDIMENTATION RATE] IN BLOOD BY WESTERGREN METHOD: 10 MM/H (ref 0–20)
GLUCOSE UR STRIP-MCNC: NEGATIVE MG/DL
HCT VFR BLD AUTO: 42.7 % (ref 35–47)
HGB BLD-MCNC: 14 G/DL (ref 11.7–15.7)
HGB UR QL STRIP: NEGATIVE
KETONES UR STRIP-MCNC: NEGATIVE MG/DL
LEUKOCYTE ESTERASE UR QL STRIP: NEGATIVE
MCH RBC QN AUTO: 31.1 PG (ref 26.5–33)
MCHC RBC AUTO-ENTMCNC: 32.8 G/DL (ref 31.5–36.5)
MCV RBC AUTO: 95 FL (ref 78–100)
MUCOUS THREADS #/AREA URNS LPF: PRESENT /LPF
NITRATE UR QL: NEGATIVE
PH UR STRIP: 7 PH (ref 5–7)
PLATELET # BLD AUTO: 252 10E9/L (ref 150–450)
RBC # BLD AUTO: 4.5 10E12/L (ref 3.8–5.2)
RBC #/AREA URNS AUTO: <1 /HPF (ref 0–2)
SOURCE: ABNORMAL
SP GR UR STRIP: 1.01 (ref 1–1.03)
SQUAMOUS #/AREA URNS AUTO: 4 /HPF (ref 0–1)
UROBILINOGEN UR STRIP-MCNC: 0 MG/DL (ref 0–2)
WBC # BLD AUTO: 8.8 10E9/L (ref 4–11)
WBC #/AREA URNS AUTO: <1 /HPF (ref 0–5)

## 2019-02-20 PROCEDURE — 85652 RBC SED RATE AUTOMATED: CPT | Performed by: INTERNAL MEDICINE

## 2019-02-20 PROCEDURE — 84460 ALANINE AMINO (ALT) (SGPT): CPT | Performed by: INTERNAL MEDICINE

## 2019-02-20 PROCEDURE — G0463 HOSPITAL OUTPT CLINIC VISIT: HCPCS | Mod: ZF

## 2019-02-20 PROCEDURE — 81001 URINALYSIS AUTO W/SCOPE: CPT | Performed by: INTERNAL MEDICINE

## 2019-02-20 PROCEDURE — 86140 C-REACTIVE PROTEIN: CPT | Performed by: INTERNAL MEDICINE

## 2019-02-20 PROCEDURE — 85027 COMPLETE CBC AUTOMATED: CPT | Performed by: INTERNAL MEDICINE

## 2019-02-20 PROCEDURE — 36415 COLL VENOUS BLD VENIPUNCTURE: CPT | Performed by: INTERNAL MEDICINE

## 2019-02-20 PROCEDURE — 84450 TRANSFERASE (AST) (SGOT): CPT | Performed by: INTERNAL MEDICINE

## 2019-02-20 RX ORDER — PREDNISONE 5 MG/1
TABLET ORAL
Qty: 50 TABLET | Refills: 1 | Status: SHIPPED | OUTPATIENT
Start: 2019-02-20 | End: 2019-04-23

## 2019-02-20 RX ORDER — PREDNISONE 5 MG/1
TABLET ORAL
Refills: 1 | Status: ON HOLD | COMMUNITY
Start: 2019-01-31 | End: 2019-08-19

## 2019-02-20 ASSESSMENT — PAIN SCALES - GENERAL: PAINLEVEL: NO PAIN (0)

## 2019-02-20 ASSESSMENT — MIFFLIN-ST. JEOR: SCORE: 1662.82

## 2019-02-20 NOTE — NURSING NOTE
"Chief Complaint   Patient presents with     Consult     Family history of RA     /84   Pulse 85   Temp 98.4  F (36.9  C) (Oral)   Ht 1.6 m (5' 3\")   Wt 106.4 kg (234 lb 8 oz)   LMP  (LMP Unknown)   SpO2 96%   BMI 41.54 kg/m    Joyce Nesbitt CMA    "

## 2019-02-20 NOTE — PROGRESS NOTES
[facility]  Rheumatology Clinic  Panchito Saenz MD  2019     Name: Zayra Ramirez  MRN: [id number]  Age: 49 year old  : 1970  Referring provider: Aime Mason     Assessment and Plan:  #Left shoulder pain, chronic:  Patient relates movement-associated left shoulder pain since 10/2018. There is no history of trauma or unusual activity. Exam reveals painful flexion, but intact active abduction on the left and minimal signs of impingement. The left shoulder is warm and there is tenderness at the AC joint. Suprisingly, there is also tenderness without synovial thickening at multiple MCPs and MTPs. Laboratory is remarkable for CRP elevation detected in 2019. MRI on 18 showed large glenohumeral effusion on the left with extensive synovitis and rice bodies. Hand films show no erosions or bony changes. Rheumatoid factor, ZHOU, and anti-CCP were recently negative.     I agree with Dr. JACKELIN Hobbs's concerns about a possible systemic inflammatory arthropathy. Patient denies clear-cut constitutional symptoms of morning stiffness or low grade fevers. She does relate fatigue, and shows tenderness within a distribution of joints associated with rheumatoid arthritis. We discussed that absent serologies are a good prognostic sign. Nevertheless, chronic steroid sensitive left shoulder pain will likely benefit further from immunomodulatory therapy. Therefore, I recommend beginning methotrexate for seronegative rheumatoid arthritis. We discussed potential risks and benefits of methotrexate use, including rare liver injury, mucositis, and nausea. I recommend that the patient begin methotrexate 15mg once weekly. While on the drug, she should consume no more than 2 alcoholic drinks weekly, and she should have liver function tests monitored every 12 weeks. To enhance short term relief from inflammatory arthritis symptoms, I recommend increasing prednisone to 15mg daily for 10 days then reducing to 10 mg daily for  10 days, then off. I asked the patient to contact us if she has no relief with increased prednisone or if shoulder pain recurs after prednisone discontinuation, despite starting methotrexate. Finally, I recommend that she be seen by physical therapy for instruction in home exercise program in range of motion exercises. The patient requested an initial follow-up with me, and I obliged her.  Recommend seeing her again in 2 months.     Orders:   - Routine UA with Micro Reflex to Culture  - Erythrocyte sedimentation rate auto  - CRP inflammation  - ALT  - AST  - CBC with platelets  - methotrexate 2.5 MG tablet  Dispense: 24 tablet; Refill: 2  - predniSONE (DELTASONE) 5 MG tablet  Dispense: 50 tablet; Refill: 1  - PHYSICAL THERAPY REFERRAL        Follow-up: Return in about 2 months (around 4/20/2019).     HPI:   Zayra Ramirez is a 49 year old female with a history of low back pain, who presents for evaluation of left shoulder pain in the setting of a possible rheumatic disorder. Patient last saw Dr. MARCELO Hobbs in rheumatology on 1/31/19 for a several month history of joint pain. Dr. Hobbs commented on negative serologies, but mildly elevated C reactive protein; seronegative RA was considered as a diagnosis and prednisone 5mg daily was started.     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 Radiologist suggested that the pattern of chondral loss would be highly atypical for osteoarthritis.     The patient reports ongoing left shoulder pain, without acute injury or trauma, that began in 10/2018 after putting pressure on her shoulder while gardening. Today, she continues to endorse persistent swelling and pain. Most of her pain occurs in the morning and at night and she notes difficulty sleeping on her left  side. She feels that she is able to mentally tolerate the pain, but the pain has made activities of daily living, such as extending her arm and styling her hair, much more difficult. She is currently treating her pain with prednisone 5mg which she feels has not helped alleviate the pain. A previous subacromial injection done at [facility] before 11/19/18 provided significant relief. She has not tried physical therapy for her shoulder. Has a family history of rheumatoid arthritis in her mother and grandmother.     Patient endorses a history of chronic back pain and right hip pain. She reports that her lumbar and cervical spine pain is stable and she is continued on naproxen to help treat these symptoms. Endorses altered sensation in the groin area of the right hip. She also has shooting pain in her feet that occurs spontaneously. Currently treating feet symptoms with gabapentin. Has no pain in hands, wrists, elbows, knees or ankles.    In regards to her other concerns, she also endorses shortness of breath and utilizes oxygen as needed during the day. She is currently on 3L of oxygen at night. she reports low energy levels and is following with psychiatry to address this concern.     Review of Systems:   Pertinent items are noted in HPI or as below, remainder of complete ROS is negative.      No recent problems with hearing or vision. No swallowing problems.   No coughing, or wheezing  No chest pain or palpitations  No heart burn, indigestion, abdominal pain, nausea, vomiting, diarrhea  No urination problems, no bloody, cloudy urine, no dysuria  No numbing, tingling, weakness  No headaches or confusion  No easy bleeding or bruising.   + fatigue  + night sweats  + shortness of breath  + skin rash under chest    Active Medications:     Current Outpatient Medications:      albuterol (PROAIR HFA, PROVENTIL HFA, VENTOLIN HFA) 108 (90 BASE) MCG/ACT inhaler, Inhale 2 puffs into the lungs every 6 hours as needed  for shortness of breath / dyspnea or wheezing, Disp: , Rfl:      ARIPiprazole (ABILIFY PO), Take 10 mg by mouth daily., Disp: , Rfl:      DULOXETINE HCL PO, Take 120 mg by mouth daily, Disp: , Rfl:      fluticasone-salmeterol (ADVAIR) 500-50 MCG/DOSE diskus inhaler, Inhale 1 puff into the lungs every 12 hours, Disp: , Rfl:      HYDROcodone-acetaminophen (NORCO) 5-325 MG per tablet, Take 1-2 tablets by mouth every 4 hours as needed (Moderate to Severe Pain), Disp: 7 tablet, Rfl: 0     lisinopril-hydrochlorothiazide (PRINZIDE/ZESTORETIC) 20-25 MG per tablet, Take 1 tablet by mouth daily, Disp: 30 tablet, Rfl: 0     montelukast (SINGULAIR) 10 MG tablet, Take 10 mg by mouth At Bedtime, Disp: , Rfl:      NAPROXEN PO, Take 500 mg by mouth 2 times daily as needed for moderate pain, Disp: , Rfl:      OXYGEN-HELIUM IN, 2-3L PRN during the day, 3L @ night, Disp: , Rfl:      ROPINIROLE HCL PO, Take 2 mg by mouth nightly as needed , Disp: , Rfl:      triamcinolone acetonide (KENALOG) 10 MG/ML injection, Inject 20mg today, Disp: 20 mL, Rfl: 0  No current facility-administered medications for this visit.     Facility-Administered Medications Ordered in Other Visits:      Lidocaine 1 % injection 0.5-5 mL, 0.5-5 mL, Other, Once PRN, Gutierrez Candelario MD     sodium chloride (PF) 0.9% PF flush 5-50 mL, 5-50 mL, Intracatheter, Once PRN, Gutierrez Candelario MD      Allergies:   Bee venom; Bees; Doxycycline; and Erythromycin      Past Medical History:  Remarkable for moderate, persistent asthma, hypertension, bipolar II disorder, interstitial lung disease, cervical stenosis with myelopathy 2017.     Chronic midline low back pain  Facial cellulitis  Morbid (severe) obesity due to excess calories  Dental abscess  Hypoxia  Subcutaneous emphysema  Borderline personality disorder  Stenosis of cervical spine with myelopathy  Esophageal stricture  GERD  HTN (hypertension)  Interstitial lung disease  Moderate persistent asthma without  "complication  Onychomycosis  Restless leg syndrome  Seasonal allergies  Smoker  Stress  Respiratory bronchiolitis interstitial lung disease       Past Surgical History:  Remarkable for surgical fusion  Hysterectomy 1997  rectocele and Cystocele surgery    Family History:   Sister: Diabetes, thyroid disease  Mother: Rheumatoid arthritis  Father: Diabetes, alcoholism  No family history of psoriasis      Social History:   Former smoker (1 pack/day)     Physical Exam:   /84   Pulse 85   Temp 98.4  F (36.9  C) (Oral)   Ht 1.6 m (5' 3\")   Wt 106.4 kg (234 lb 8 oz)   LMP  (LMP Unknown)   SpO2 96%   BMI 41.54 kg/m     Wt Readings from Last 4 Encounters:   02/20/19 106.4 kg (234 lb 8 oz)   01/15/19 102.1 kg (225 lb)   11/27/18 103.9 kg (229 lb)   10/23/18 97.1 kg (214 lb)     Constitutional: Well-developed, appearing stated age; cooperative  Eyes: Normal EOM, PERRLA, vision, conjunctiva, sclera  ENT: Normal external ears, nose, hearing, lips, teeth, gums, throat. No mucous membrane lesions, normal saliva pool  Neck: No mass or thyroid enlargement  Resp: Lungs clear to auscultation, nl to palpation  CV: RRR, no murmurs, rubs or gallops, no edema  GI: No ABD mass or tenderness, no HSM  : Not tested  Lymph: No cervical, supraclavicular, inguinal or epitrochlear nodes  MS: Good alignment of knuckles and finger joints. No asymmetry or visible swelling. Fist formation is excellent. Good  strength bilaterally. Mild tenderness at the left 4th MCP, but no synovial thickening at the finger joints. Wrist range of motion is normal. No tenderness or swelling at the right elbow. Left elbow also normal. Excellent external rotation, internal rotation, abduction, and flexion on the right shoulder. Pain elicited with 30 degrees of flexion of the left shoulder. Able to achieve flexion of 90 degrees. Abduction is less painful and extends to 90 degrees actively and passively. External rotation is painful, but full range of " motion. internal rotation is full without pain. Left shoulder is warmer than the right. Specific tenderness over AC joint and over coracoid. Knees are cool. Pain elicited in right low back with abduction of the hip, but intrinsic hip motion is normal. Right hip range of motion is normal. No crepitance of the knees. No evidence of dactylitis. Tender at left 3rd MTP. Right 2nd and 5th MTP tender, but no synovial thickening.   Skin: No nail pitting, alopecia, rash, nodules or lesions. Erythematous rash under chest, but no adherent scale.   Neuro: Normal cranial nerves, strength, sensation, DTRs.   Psych: Normal judgement, orientation, memory, affect.       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 Radiologist suggested that the pattern of chondral loss would be highly atypical for osteoarthritis.     X-rays of bilateral shoulders - 2019  Unremarkable.    X-rays of hands - 2019  Normal.    Laboratory:   RHEUM RESULTS Latest Ref Rng & Units 4/26/2018 10/3/2018 2/20/2019   SED RATE 0 - 20 mm/h - - 10   CRP, INFLAMMATION 0.0 - 8.0 mg/L - - 12.4(H)   AST 0 - 45 U/L - - 12   ALT 0 - 50 U/L - - 15   ALBUMIN 3.4 - 5.0 g/dL - - -   WBC 4.0 - 11.0 10e9/L 10.9 - 8.8   RBC 3.8 - 5.2 10e12/L 4.84 - 4.50   HGB 11.7 - 15.7 g/dL 14.2 14.5 14.0   HCT 35.0 - 47.0 % 43.6 - 42.7   MCV 78 - 100 fl 90 - 95   MCHC 31.5 - 36.5 g/dL 32.6 - 32.8   RDW 10.0 - 15.0 % 14.8 - 14.6    - 450 10e9/L 296 - 252   CREATININE 0.52 - 1.04 mg/dL - 0.78 -   GFR ESTIMATE, IF BLACK >60 mL/min/1.7m2 - >90 -   GFR ESTIMATE >60 mL/min/1.7m2 - 79 -       Myositis panel in 1/2019 was negative.     Labs from 1/2019 via [facility]  Negative SSA, SSB, rheumatoid factor, anti-CCP, and ZHOU.     10/2018:   electrolytes, creatinine, and TSH  were normal        Scribe Disclosure:   I, Ulices Anguiano, am serving as a scribe to document services personally performed by Panchito Saenz MD at this visit, based upon the provider's statements to me. All documentation has been reviewed by the aforementioned provider prior to being entered into the official medical record.     Portions of this medical record were completed by a scribe. UPON MY REVIEW AND AUTHENTICATION BY ELECTRONIC SIGNATURE, this confirms (a) I performed the applicable clinical services, and (b) the record is accurate.

## 2019-02-20 NOTE — PATIENT INSTRUCTIONS
Dx:  1. Inflammatory arthritis, L shoulder, hands, and feet  Plan:  Prednisone 15 mg for 10 days, then 10 mg for 10 days.  Start methotrexate 15 mg once weekly.  Physical therapy for preserving L shoulder range of motion.    Methotrexate tablets  Brand Names: Rheumatrex, Trexall  What is this medicine?  METHOTREXATE (METH oh TREX ate) is a chemotherapy drug used to treat cancer including breast cancer, leukemia, and lymphoma. This medicine can also be used to treat psoriasis and certain kinds of arthritis.  How should I use this medicine?  Take this medicine by mouth with a glass of water. Follow the directions on the prescription label. Take your medicine at regular intervals. Do not take it more often than directed. Do not stop taking except on your doctor's advice.  Make sure you know why you are taking this medicine and how often you should take it. If this medicine is used for a condition that is not cancer, like arthritis or psoriasis, it should be taken weekly, NOT daily. Taking this medicine more often than directed can cause serious side effects, even death.  Talk to your healthcare provider about safe handling and disposal of this medicine. You may need to take special precautions.  Talk to your pediatrician regarding the use of this medicine in children. While this drug may be prescribed for selected conditions, precautions do apply.  What side effects may I notice from receiving this medicine?  Side effects that you should report to your doctor or health care professional as soon as possible:    allergic reactions like skin rash, itching or hives, swelling of the face, lips, or tongue    breathing problems or shortness of breath    diarrhea    dry, nonproductive cough    low blood counts - this medicine may decrease the number of white blood cells, red blood cells and platelets. You may be at increased risk for infections and bleeding.    mouth sores    redness, blistering, peeling or loosening of the  skin, including inside the mouth    signs of infection - fever or chills, cough, sore throat, pain or trouble passing urine    signs and symptoms of bleeding such as bloody or black, tarry stools; red or dark-brown urine; spitting up blood or brown material that looks like coffee grounds; red spots on the skin; unusual bruising or bleeding from the eye, gums, or nose    signs and symptoms of kidney injury like trouble passing urine or change in the amount of urine    signs and symptoms of liver injury like dark yellow or brown urine; general ill feeling or flu-like symptoms; light-colored stools; loss of appetite; nausea; right upper belly pain; unusually weak or tired; yellowing of the eyes or skin  Side effects that usually do not require medical attention (report to your doctor or health care professional if they continue or are bothersome):    dizziness    hair loss    tiredness    upset stomach    vomiting  What may interact with this medicine?  This medicine may interact with the following medication:    acitretin    aspirin and aspirin-like medicines including salicylates    azathioprine    certain antibiotics like penicillins, tetracycline, and chloramphenicol    cyclosporine    gold    hydroxychloroquine    live virus vaccines    NSAIDs, medicines for pain and inflammation, like ibuprofen or naproxen    other cytotoxic agents    penicillamine    phenylbutazone    phenytoin    probenecid    retinoids such as isotretinoin and tretinoin    steroid medicines like prednisone or cortisone    sulfonamides like sulfasalazine and trimethoprim/sulfamethoxazole    theophylline  What if I miss a dose?  If you miss a dose, talk with your doctor or health care professional. Do not take double or extra doses.  Where should I keep my medicine?  Keep out of the reach of children.  Store at room temperature between 20 and 25 degrees C (68 and 77 degrees F). Protect from light. Throw away any unused medicine after the  expiration date.  What should I tell my health care provider before I take this medicine?  They need to know if you have any of these conditions:    fluid in the stomach area or lungs    if you often drink alcohol    infection or immune system problems    kidney disease or on hemodialysis    liver disease    low blood counts, like low white cell, platelet, or red cell counts    lung disease    radiation therapy    stomach ulcers    ulcerative colitis    an unusual or allergic reaction to methotrexate, other medicines, foods, dyes, or preservatives    pregnant or trying to get pregnant    breast-feeding  What should I watch for while using this medicine?  Avoid alcoholic drinks.  This medicine can make you more sensitive to the sun. Keep out of the sun. If you cannot avoid being in the sun, wear protective clothing and use sunscreen. Do not use sun lamps or tanning beds/booths.  You may need blood work done while you are taking this medicine.  Call your doctor or health care professional for advice if you get a fever, chills or sore throat, or other symptoms of a cold or flu. Do not treat yourself. This drug decreases your body's ability to fight infections. Try to avoid being around people who are sick.  This medicine may increase your risk to bruise or bleed. Call your doctor or health care professional if you notice any unusual bleeding.  Check with your doctor or health care professional if you get an attack of severe diarrhea, nausea and vomiting, or if you sweat a lot. The loss of too much body fluid can make it dangerous for you to take this medicine.  Talk to your doctor about your risk of cancer. You may be more at risk for certain types of cancers if you take this medicine.  Both men and women must use effective birth control with this medicine. Do not become pregnant while taking this medicine or until at least 1 normal menstrual cycle has occurred after stopping it. Women should inform their doctor if  they wish to become pregnant or think they might be pregnant. Men should not father a child while taking this medicine and for 3 months after stopping it. There is a potential for serious side effects to an unborn child. Talk to your health care professional or pharmacist for more information. Do not breast-feed an infant while taking this medicine.  NOTE:This sheet is a summary. It may not cover all possible information. If you have questions about this medicine, talk to your doctor, pharmacist, or health care provider. Copyright  2018 [employer]

## 2019-02-20 NOTE — LETTER
2019       RE: Zayra Ramirez  [id number] [place] [address]     Dear Colleague,    Thank you for referring your patient, Zayra Ramirez, to the [facility] RHEUMATOLOGY at [facility]. Please see a copy of my visit note below.    [facility]  Rheumatology Clinic  Panchito Saenz MD  2019     Name: Zayra Ramirez  MRN: [id number]  Age: 49 year old  : 1970  Referring provider: Aime Mason     Assessment and Plan:  #Left shoulder pain, chronic:  Patient relates movement-associated left shoulder pain since 10/2018. There is no history of trauma or unusual activity. Exam reveals painful flexion, but intact active abduction on the left and minimal signs of impingement. The left shoulder is warm and there is tenderness at the AC joint. Suprisingly, there is also tenderness without synovial thickening at multiple MCPs and MTPs. Laboratory is remarkable for CRP elevation detected in 2019. MRI on 18 showed large glenohumeral effusion on the left with extensive synovitis and rice bodies. Hand films show no erosions or bony changes. Rheumatoid factor, ZHOU, and anti-CCP were recently negative.     I agree with Dr. JACKELIN Hobbs's concerns about a possible systemic inflammatory arthropathy. Patient denies clear-cut constitutional symptoms of morning stiffness or low grade fevers. She does relate fatigue, and shows tenderness within a distribution of joints associated with rheumatoid arthritis. We discussed that absent serologies are a good prognostic sign. Nevertheless, chronic steroid sensitive left shoulder pain will likely benefit further from immunomodulatory therapy. Therefore, I recommend beginning methotrexate for seronegative rheumatoid arthritis. We discussed potential risks and benefits of methotrexate use, including rare liver injury, mucositis, and nausea. I recommend that the patient begin methotrexate 15mg once weekly. While on the drug,  she should consume no more than 2 alcoholic drinks weekly, and she should have liver function tests monitored every 12 weeks. To enhance short term relief from inflammatory arthritis symptoms, I recommend increasing prednisone to 15mg daily for 10 days then reducing to 10 mg daily for 10 days, then off. I asked the patient to contact us if she has no relief with increased prednisone or if shoulder pain recurs after prednisone discontinuation, despite starting methotrexate. Finally, I recommend that she be seen by physical therapy for instruction in home exercise program in range of motion exercises. The patient requested an initial follow-up with me, and I obliged her.  Recommend seeing her again in 2 months.     Orders:   - Routine UA with Micro Reflex to Culture  - Erythrocyte sedimentation rate auto  - CRP inflammation  - ALT  - AST  - CBC with platelets  - methotrexate 2.5 MG tablet  Dispense: 24 tablet; Refill: 2  - predniSONE (DELTASONE) 5 MG tablet  Dispense: 50 tablet; Refill: 1  - PHYSICAL THERAPY REFERRAL        Follow-up: Return in about 2 months (around 4/20/2019).     HPI:   Zayra Ramirez is a 49 year old female with a history of low back pain, who presents for evaluation of left shoulder pain in the setting of a possible rheumatic disorder. Patient last saw Dr. MARCELO Hobbs in rheumatology on 1/31/19 for a several month history of joint pain. Dr. Hobbs commented on negative serologies, but mildly elevated C reactive protein; seronegative RA was considered as a diagnosis and prednisone 5mg daily was started.     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 Radiologist suggested that the pattern of chondral loss would be highly atypical for osteoarthritis.     The patient reports ongoing left  shoulder pain, without acute injury or trauma, that began in 10/2018 after putting pressure on her shoulder while gardening. Today, she continues to endorse persistent swelling and pain. Most of her pain occurs in the morning and at night and she notes difficulty sleeping on her left side. She feels that she is able to mentally tolerate the pain, but the pain has made activities of daily living, such as extending her arm and styling her hair, much more difficult. She is currently treating her pain with prednisone 5mg which she feels has not helped alleviate the pain. A previous subacromial injection done at [facility] before 11/19/18 provided significant relief. She has not tried physical therapy for her shoulder. Has a family history of rheumatoid arthritis in her mother and grandmother.     Patient endorses a history of chronic back pain and right hip pain. She reports that her lumbar and cervical spine pain is stable and she is continued on naproxen to help treat these symptoms. Endorses altered sensation in the groin area of the right hip. She also has shooting pain in her feet that occurs spontaneously. Currently treating feet symptoms with gabapentin. Has no pain in hands, wrists, elbows, knees or ankles.    In regards to her other concerns, she also endorses shortness of breath and utilizes oxygen as needed during the day. She is currently on 3L of oxygen at night. she reports low energy levels and is following with psychiatry to address this concern.     Review of Systems:   Pertinent items are noted in HPI or as below, remainder of complete ROS is negative.      No recent problems with hearing or vision. No swallowing problems.   No coughing, or wheezing  No chest pain or palpitations  No heart burn, indigestion, abdominal pain, nausea, vomiting, diarrhea  No urination problems, no bloody, cloudy urine, no dysuria  No numbing, tingling, weakness  No headaches or confusion  No easy bleeding or  bruising.   + fatigue  + night sweats  + shortness of breath  + skin rash under chest    Active Medications:     Current Outpatient Medications:      albuterol (PROAIR HFA, PROVENTIL HFA, VENTOLIN HFA) 108 (90 BASE) MCG/ACT inhaler, Inhale 2 puffs into the lungs every 6 hours as needed for shortness of breath / dyspnea or wheezing, Disp: , Rfl:      ARIPiprazole (ABILIFY PO), Take 10 mg by mouth daily., Disp: , Rfl:      DULOXETINE HCL PO, Take 120 mg by mouth daily, Disp: , Rfl:      fluticasone-salmeterol (ADVAIR) 500-50 MCG/DOSE diskus inhaler, Inhale 1 puff into the lungs every 12 hours, Disp: , Rfl:      HYDROcodone-acetaminophen (NORCO) 5-325 MG per tablet, Take 1-2 tablets by mouth every 4 hours as needed (Moderate to Severe Pain), Disp: 7 tablet, Rfl: 0     lisinopril-hydrochlorothiazide (PRINZIDE/ZESTORETIC) 20-25 MG per tablet, Take 1 tablet by mouth daily, Disp: 30 tablet, Rfl: 0     montelukast (SINGULAIR) 10 MG tablet, Take 10 mg by mouth At Bedtime, Disp: , Rfl:      NAPROXEN PO, Take 500 mg by mouth 2 times daily as needed for moderate pain, Disp: , Rfl:      OXYGEN-HELIUM IN, 2-3L PRN during the day, 3L @ night, Disp: , Rfl:      ROPINIROLE HCL PO, Take 2 mg by mouth nightly as needed , Disp: , Rfl:      triamcinolone acetonide (KENALOG) 10 MG/ML injection, Inject 20mg today, Disp: 20 mL, Rfl: 0  No current facility-administered medications for this visit.     Facility-Administered Medications Ordered in Other Visits:      Lidocaine 1 % injection 0.5-5 mL, 0.5-5 mL, Other, Once PRN, Gutierrez Candelario MD     sodium chloride (PF) 0.9% PF flush 5-50 mL, 5-50 mL, Intracatheter, Once PRN, Gutierrez Candelario MD      Allergies:   Bee venom; Bees; Doxycycline; and Erythromycin      Past Medical History:  Remarkable for moderate, persistent asthma, hypertension, bipolar II disorder, interstitial lung disease, cervical stenosis with myelopathy 2017.     Chronic midline low back pain  Facial cellulitis  Morbid  "(severe) obesity due to excess calories  Dental abscess  Hypoxia  Subcutaneous emphysema  Borderline personality disorder  Stenosis of cervical spine with myelopathy  Esophageal stricture  GERD  HTN (hypertension)  Interstitial lung disease  Moderate persistent asthma without complication  Onychomycosis  Restless leg syndrome  Seasonal allergies  Smoker  Stress  Respiratory bronchiolitis interstitial lung disease       Past Surgical History:  Remarkable for surgical fusion  Hysterectomy 1997  rectocele and Cystocele surgery    Family History:   Sister: Diabetes, thyroid disease  Mother: Rheumatoid arthritis  Father: Diabetes, alcoholism  No family history of psoriasis      Social History:   Former smoker (1 pack/day)     Physical Exam:   /84   Pulse 85   Temp 98.4  F (36.9  C) (Oral)   Ht 1.6 m (5' 3\")   Wt 106.4 kg (234 lb 8 oz)   LMP  (LMP Unknown)   SpO2 96%   BMI 41.54 kg/m      Wt Readings from Last 4 Encounters:   02/20/19 106.4 kg (234 lb 8 oz)   01/15/19 102.1 kg (225 lb)   11/27/18 103.9 kg (229 lb)   10/23/18 97.1 kg (214 lb)     Constitutional: Well-developed, appearing stated age; cooperative  Eyes: Normal EOM, PERRLA, vision, conjunctiva, sclera  ENT: Normal external ears, nose, hearing, lips, teeth, gums, throat. No mucous membrane lesions, normal saliva pool  Neck: No mass or thyroid enlargement  Resp: Lungs clear to auscultation, nl to palpation  CV: RRR, no murmurs, rubs or gallops, no edema  GI: No ABD mass or tenderness, no HSM  : Not tested  Lymph: No cervical, supraclavicular, inguinal or epitrochlear nodes  MS: Good alignment of knuckles and finger joints. No asymmetry or visible swelling. Fist formation is excellent. Good  strength bilaterally. Mild tenderness at the left 4th MCP, but no synovial thickening at the finger joints. Wrist range of motion is normal. No tenderness or swelling at the right elbow. Left elbow also normal. Excellent external rotation, internal " rotation, abduction, and flexion on the right shoulder. Pain elicited with 30 degrees of flexion of the left shoulder. Able to achieve flexion of 90 degrees. Abduction is less painful and extends to 90 degrees actively and passively. External rotation is painful, but full range of motion. internal rotation is full without pain. Left shoulder is warmer than the right. Specific tenderness over AC joint and over coracoid. Knees are cool. Pain elicited in right low back with abduction of the hip, but intrinsic hip motion is normal. Right hip range of motion is normal. No crepitance of the knees. No evidence of dactylitis. Tender at left 3rd MTP. Right 2nd and 5th MTP tender, but no synovial thickening.   Skin: No nail pitting, alopecia, rash, nodules or lesions. Erythematous rash under chest, but no adherent scale.   Neuro: Normal cranial nerves, strength, sensation, DTRs.   Psych: Normal judgement, orientation, memory, affect.       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 Radiologist suggested that the pattern of chondral loss would be highly atypical for osteoarthritis.     X-rays of bilateral shoulders - 2019  Unremarkable.    X-rays of hands - 2019  Normal.    Laboratory:   RHEUM RESULTS Latest Ref Rng & Units 4/26/2018 10/3/2018 2/20/2019   SED RATE 0 - 20 mm/h - - 10   CRP, INFLAMMATION 0.0 - 8.0 mg/L - - 12.4(H)   AST 0 - 45 U/L - - 12   ALT 0 - 50 U/L - - 15   ALBUMIN 3.4 - 5.0 g/dL - - -   WBC 4.0 - 11.0 10e9/L 10.9 - 8.8   RBC 3.8 - 5.2 10e12/L 4.84 - 4.50   HGB 11.7 - 15.7 g/dL 14.2 14.5 14.0   HCT 35.0 - 47.0 % 43.6 - 42.7   MCV 78 - 100 fl 90 - 95   MCHC 31.5 - 36.5 g/dL 32.6 - 32.8   RDW 10.0 - 15.0 % 14.8 - 14.6    - 450 10e9/L 296 - 252   CREATININE 0.52 - 1.04 mg/dL - 0.78 -    GFR ESTIMATE, IF BLACK >60 mL/min/1.7m2 - >90 -   GFR ESTIMATE >60 mL/min/1.7m2 - 79 -       Myositis panel in 1/2019 was negative.     Labs from 1/2019 via [facility]  Negative SSA, SSB, rheumatoid factor, anti-CCP, and ZHOU.     10/2018:   electrolytes, creatinine, and TSH were normal    Scribe Disclosure:   I, Ulices Anguiano, am serving as a scribe to document services personally performed by Panchito Saenz MD at this visit, based upon the provider's statements to me. All documentation has been reviewed by the aforementioned provider prior to being entered into the official medical record.     Portions of this medical record were completed by a scribe. UPON MY REVIEW AND AUTHENTICATION BY ELECTRONIC SIGNATURE, this confirms (a) I performed the applicable clinical services, and (b) the record is accurate.    Panchito Saenz MD

## 2019-02-20 NOTE — LETTER
2019      RE: Zayra Ramirez  [address] [place] [address]       [facility]  Rheumatology Clinic  Panchito Saenz MD  2019     Name: Zayra Ramirez  MRN: [id number]  Age: 49 year old  : 1970  Referring provider: Aime Mason     Assessment and Plan:  #Left shoulder pain, chronic:  Patient relates movement-associated left shoulder pain since 10/2018. There is no history of trauma or unusual activity. Exam reveals painful flexion, but intact active abduction on the left and minimal signs of impingement. The left shoulder is warm and there is tenderness at the AC joint. Suprisingly, there is also tenderness without synovial thickening at multiple MCPs and MTPs. Laboratory is remarkable for CRP elevation detected in 2019. MRI on 18 showed large glenohumeral effusion on the left with extensive synovitis and rice bodies. Hand films show no erosions or bony changes. Rheumatoid factor, ZHOU, and anti-CCP were recently negative.     I agree with Dr. JACKELIN Hobbs's concerns about a possible systemic inflammatory arthropathy. Patient denies clear-cut constitutional symptoms of morning stiffness or low grade fevers. She does relate fatigue, and shows tenderness within a distribution of joints associated with rheumatoid arthritis. We discussed that absent serologies are a good prognostic sign. Nevertheless, chronic steroid sensitive left shoulder pain will likely benefit further from immunomodulatory therapy. Therefore, I recommend beginning methotrexate for seronegative rheumatoid arthritis. We discussed potential risks and benefits of methotrexate use, including rare liver injury, mucositis, and nausea. I recommend that the patient begin methotrexate 15mg once weekly. While on the drug, she should consume no more than 2 alcoholic drinks weekly, and she should have liver function tests monitored every 12 weeks. To enhance short term relief from inflammatory arthritis symptoms, I  recommend increasing prednisone to 15mg daily for 10 days then reducing to 10 mg daily for 10 days, then off. I asked the patient to contact us if she has no relief with increased prednisone or if shoulder pain recurs after prednisone discontinuation, despite starting methotrexate. Finally, I recommend that she be seen by physical therapy for instruction in home exercise program in range of motion exercises. The patient requested an initial follow-up with me, and I obliged her.  Recommend seeing her again in 2 months.     Orders:   - Routine UA with Micro Reflex to Culture  - Erythrocyte sedimentation rate auto  - CRP inflammation  - ALT  - AST  - CBC with platelets  - methotrexate 2.5 MG tablet  Dispense: 24 tablet; Refill: 2  - predniSONE (DELTASONE) 5 MG tablet  Dispense: 50 tablet; Refill: 1  - PHYSICAL THERAPY REFERRAL        Follow-up: Return in about 2 months (around 4/20/2019).     HPI:   Zayra Ramirez is a 49 year old female with a history of low back pain, who presents for evaluation of left shoulder pain in the setting of a possible rheumatic disorder. Patient last saw Dr. MARCELO Hobbs in rheumatology on 1/31/19 for a several month history of joint pain. Dr. Hobbs commented on negative serologies, but mildly elevated C reactive protein; seronegative RA was considered as a diagnosis and prednisone 5mg daily was started.     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 Radiologist suggested that the pattern of chondral loss would be highly atypical for osteoarthritis.     The patient reports ongoing left shoulder pain, without acute injury or trauma, that began in 10/2018 after putting pressure on her shoulder while gardening. Today, she continues to endorse persistent swelling and pain. Most of  her pain occurs in the morning and at night and she notes difficulty sleeping on her left side. She feels that she is able to mentally tolerate the pain, but the pain has made activities of daily living, such as extending her arm and styling her hair, much more difficult. She is currently treating her pain with prednisone 5mg which she feels has not helped alleviate the pain. A previous subacromial injection done at [facility] before 11/19/18 provided significant relief. She has not tried physical therapy for her shoulder. Has a family history of rheumatoid arthritis in her mother and grandmother.     Patient endorses a history of chronic back pain and right hip pain. She reports that her lumbar and cervical spine pain is stable and she is continued on naproxen to help treat these symptoms. Endorses altered sensation in the groin area of the right hip. She also has shooting pain in her feet that occurs spontaneously. Currently treating feet symptoms with gabapentin. Has no pain in hands, wrists, elbows, knees or ankles.    In regards to her other concerns, she also endorses shortness of breath and utilizes oxygen as needed during the day. She is currently on 3L of oxygen at night. she reports low energy levels and is following with psychiatry to address this concern.     Review of Systems:   Pertinent items are noted in HPI or as below, remainder of complete ROS is negative.      No recent problems with hearing or vision. No swallowing problems.   No coughing, or wheezing  No chest pain or palpitations  No heart burn, indigestion, abdominal pain, nausea, vomiting, diarrhea  No urination problems, no bloody, cloudy urine, no dysuria  No numbing, tingling, weakness  No headaches or confusion  No easy bleeding or bruising.   + fatigue  + night sweats  + shortness of breath  + skin rash under chest    Active Medications:     Current Outpatient Medications:      albuterol (PROAIR HFA, PROVENTIL HFA, VENTOLIN  HFA) 108 (90 BASE) MCG/ACT inhaler, Inhale 2 puffs into the lungs every 6 hours as needed for shortness of breath / dyspnea or wheezing, Disp: , Rfl:      ARIPiprazole (ABILIFY PO), Take 10 mg by mouth daily., Disp: , Rfl:      DULOXETINE HCL PO, Take 120 mg by mouth daily, Disp: , Rfl:      fluticasone-salmeterol (ADVAIR) 500-50 MCG/DOSE diskus inhaler, Inhale 1 puff into the lungs every 12 hours, Disp: , Rfl:      HYDROcodone-acetaminophen (NORCO) 5-325 MG per tablet, Take 1-2 tablets by mouth every 4 hours as needed (Moderate to Severe Pain), Disp: 7 tablet, Rfl: 0     lisinopril-hydrochlorothiazide (PRINZIDE/ZESTORETIC) 20-25 MG per tablet, Take 1 tablet by mouth daily, Disp: 30 tablet, Rfl: 0     montelukast (SINGULAIR) 10 MG tablet, Take 10 mg by mouth At Bedtime, Disp: , Rfl:      NAPROXEN PO, Take 500 mg by mouth 2 times daily as needed for moderate pain, Disp: , Rfl:      OXYGEN-HELIUM IN, 2-3L PRN during the day, 3L @ night, Disp: , Rfl:      ROPINIROLE HCL PO, Take 2 mg by mouth nightly as needed , Disp: , Rfl:      triamcinolone acetonide (KENALOG) 10 MG/ML injection, Inject 20mg today, Disp: 20 mL, Rfl: 0  No current facility-administered medications for this visit.     Facility-Administered Medications Ordered in Other Visits:      Lidocaine 1 % injection 0.5-5 mL, 0.5-5 mL, Other, Once PRN, Gutierrez Candelario MD     sodium chloride (PF) 0.9% PF flush 5-50 mL, 5-50 mL, Intracatheter, Once PRN, Gutierrez Candelario MD      Allergies:   Bee venom; Bees; Doxycycline; and Erythromycin      Past Medical History:  Remarkable for moderate, persistent asthma, hypertension, bipolar II disorder, interstitial lung disease, cervical stenosis with myelopathy 2017.     Chronic midline low back pain  Facial cellulitis  Morbid (severe) obesity due to excess calories  Dental abscess  Hypoxia  Subcutaneous emphysema  Borderline personality disorder  Stenosis of cervical spine with myelopathy  Esophageal stricture  GERD  HTN  "(hypertension)  Interstitial lung disease  Moderate persistent asthma without complication  Onychomycosis  Restless leg syndrome  Seasonal allergies  Smoker  Stress  Respiratory bronchiolitis interstitial lung disease       Past Surgical History:  Remarkable for surgical fusion  Hysterectomy 1997  rectocele and Cystocele surgery    Family History:   Sister: Diabetes, thyroid disease  Mother: Rheumatoid arthritis  Father: Diabetes, alcoholism  No family history of psoriasis      Social History:   Former smoker (1 pack/day)     Physical Exam:   /84   Pulse 85   Temp 98.4  F (36.9  C) (Oral)   Ht 1.6 m (5' 3\")   Wt 106.4 kg (234 lb 8 oz)   LMP  (LMP Unknown)   SpO2 96%   BMI 41.54 kg/m      Wt Readings from Last 4 Encounters:   02/20/19 106.4 kg (234 lb 8 oz)   01/15/19 102.1 kg (225 lb)   11/27/18 103.9 kg (229 lb)   10/23/18 97.1 kg (214 lb)     Constitutional: Well-developed, appearing stated age; cooperative  Eyes: Normal EOM, PERRLA, vision, conjunctiva, sclera  ENT: Normal external ears, nose, hearing, lips, teeth, gums, throat. No mucous membrane lesions, normal saliva pool  Neck: No mass or thyroid enlargement  Resp: Lungs clear to auscultation, nl to palpation  CV: RRR, no murmurs, rubs or gallops, no edema  GI: No ABD mass or tenderness, no HSM  : Not tested  Lymph: No cervical, supraclavicular, inguinal or epitrochlear nodes  MS: Good alignment of knuckles and finger joints. No asymmetry or visible swelling. Fist formation is excellent. Good  strength bilaterally. Mild tenderness at the left 4th MCP, but no synovial thickening at the finger joints. Wrist range of motion is normal. No tenderness or swelling at the right elbow. Left elbow also normal. Excellent external rotation, internal rotation, abduction, and flexion on the right shoulder. Pain elicited with 30 degrees of flexion of the left shoulder. Able to achieve flexion of 90 degrees. Abduction is less painful and extends to 90 " degrees actively and passively. External rotation is painful, but full range of motion. internal rotation is full without pain. Left shoulder is warmer than the right. Specific tenderness over AC joint and over coracoid. Knees are cool. Pain elicited in right low back with abduction of the hip, but intrinsic hip motion is normal. Right hip range of motion is normal. No crepitance of the knees. No evidence of dactylitis. Tender at left 3rd MTP. Right 2nd and 5th MTP tender, but no synovial thickening.   Skin: No nail pitting, alopecia, rash, nodules or lesions. Erythematous rash under chest, but no adherent scale.   Neuro: Normal cranial nerves, strength, sensation, DTRs.   Psych: Normal judgement, orientation, memory, affect.       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 Radiologist suggested that the pattern of chondral loss would be highly atypical for osteoarthritis.     X-rays of bilateral shoulders - 2019  Unremarkable.    X-rays of hands - 2019  Normal.    Laboratory:   RHEUM RESULTS Latest Ref Rng & Units 4/26/2018 10/3/2018 2/20/2019   SED RATE 0 - 20 mm/h - - 10   CRP, INFLAMMATION 0.0 - 8.0 mg/L - - 12.4(H)   AST 0 - 45 U/L - - 12   ALT 0 - 50 U/L - - 15   ALBUMIN 3.4 - 5.0 g/dL - - -   WBC 4.0 - 11.0 10e9/L 10.9 - 8.8   RBC 3.8 - 5.2 10e12/L 4.84 - 4.50   HGB 11.7 - 15.7 g/dL 14.2 14.5 14.0   HCT 35.0 - 47.0 % 43.6 - 42.7   MCV 78 - 100 fl 90 - 95   MCHC 31.5 - 36.5 g/dL 32.6 - 32.8   RDW 10.0 - 15.0 % 14.8 - 14.6    - 450 10e9/L 296 - 252   CREATININE 0.52 - 1.04 mg/dL - 0.78 -   GFR ESTIMATE, IF BLACK >60 mL/min/1.7m2 - >90 -   GFR ESTIMATE >60 mL/min/1.7m2 - 79 -       Myositis panel in 1/2019 was negative.     Labs from 1/2019 via [facility]  Negative SSA, SSB, rheumatoid  factor, anti-CCP, and ZHOU.     10/2018:   electrolytes, creatinine, and TSH were normal        Scribe Disclosure:   I, Ulices Anguiano, am serving as a scribe to document services personally performed by Panchito Saenz MD at this visit, based upon the provider's statements to me. All documentation has been reviewed by the aforementioned provider prior to being entered into the official medical record.     Portions of this medical record were completed by a scribe. UPON MY REVIEW AND AUTHENTICATION BY ELECTRONIC SIGNATURE, this confirms (a) I performed the applicable clinical services, and (b) the record is accurate.      Panchito Saenz MD

## 2019-02-26 ENCOUNTER — THERAPY VISIT (OUTPATIENT)
Dept: PHYSICAL THERAPY | Facility: CLINIC | Age: 49
End: 2019-02-26
Attending: INTERNAL MEDICINE
Payer: COMMERCIAL

## 2019-02-26 DIAGNOSIS — M25.512 LEFT SHOULDER PAIN, UNSPECIFIED CHRONICITY: ICD-10-CM

## 2019-02-26 PROCEDURE — 97161 PT EVAL LOW COMPLEX 20 MIN: CPT | Mod: GP | Performed by: PHYSICAL THERAPIST

## 2019-02-26 PROCEDURE — 97110 THERAPEUTIC EXERCISES: CPT | Mod: GP | Performed by: PHYSICAL THERAPIST

## 2019-02-26 PROCEDURE — G8984 CARRY CURRENT STATUS: HCPCS | Mod: GP | Performed by: PHYSICAL THERAPIST

## 2019-02-26 PROCEDURE — G8985 CARRY GOAL STATUS: HCPCS | Mod: GP | Performed by: PHYSICAL THERAPIST

## 2019-02-26 PROCEDURE — 97140 MANUAL THERAPY 1/> REGIONS: CPT | Mod: GP | Performed by: PHYSICAL THERAPIST

## 2019-02-26 NOTE — PROGRESS NOTES
"  Physical Therapy Initial Evaluation    Precautions/Restrictions/MD instructions: PT eval and treat.       Subjective History:    Injury/Condition Details:  Presenting Complaint Zayra presents with L) shoulder pain. Started in October and has been pretty stable since then. Had a steroid injection that helped quite a bit back in November, able to move it more. Still doesn't have full ROM and still feels like she is protecting the shoulder. Rheumatologist thinks there is definitely an inflammatory component to the pain, but isn't presenting typically. Just started the medications from rheumatologist, specifically increased steroid dosage.    Onset Timing/Date MD referral 2/20/19   Mechanism Not sure      Symptom Behavior Details   Primary Pain Symptoms Location: posterior upper shoulder near supraspinatus  Quality: aching   Frequency: intermittent   Worst Pain 6/10   Best Pain 3/10   Symptom Provocators Using arm for extended periods of time   Symptom Relievers nothing   Time of day dependent? no   Symptom change since onset? No change      Prior Testing/Intervention for current condition:  Prior Tests MRI in November   Prior Treatment Steroid injection - helpful      Lifestyle & General Medical History  General Health Reported by Patient fair   Employment/Activities Active with gardening; does not work - on disability   Pertinent medical/surgical history Asthma, depression, high blood pressure, overweight, RA   Patient Goals Decrease pain, \"move shoulder better\"     SHOULDER:    Shoulder:   PROM L AROM L AROM R MMT L   Flex 160 deg 150 deg, + 160 deg 4/5, ++   Abd 130 deg 100 deg, ++ 130 deg 4+/5, +   IR 60 deg   5/5   ER 80 deg   4-/5, +   Ext/IR  T10, + T10      Scapulothoraic Rhythm: weakness, winging L>R    Palpation: tender L) posterior cuff, supraspinatus, pectoralis minor    Special tests:   L   Impingement    Neer's -   Hawkin's-Deshaun +   Coracoid Impingement -   Internal impingement -   Biceps   "   Speed's -   Rotator Cuff Tear    Drop Arm -   Belly Press -   Lift off  -     GH Mobility  L   Posterior glide WNL   Inferior glide WNL   Anterior glide WNL       Patient is a 49 year old female with left side shoulder complaints.    Patient has the following significant findings with corresponding treatment plan.                Diagnosis 1:  L) shoulder pain  Pain -  manual therapy, self management, education and home program  Decreased ROM/flexibility - manual therapy and therapeutic exercise  Decreased strength - therapeutic exercise and therapeutic activities  Decreased proprioception - neuro re-education and therapeutic activities  Impaired muscle performance - neuro re-education  Decreased function - therapeutic activities    Therapy Evaluation Codes:   1) History comprised of:   Personal factors that impact the plan of care:      None.    Comorbidity factors that impact the plan of care are:      Asthma, Depression, High blood pressure, Overweight and Rheumatoid arthritis.     Medications impacting care: Anti-depressant, Anti-inflammatory, High blood pressure and Pain.  2) Examination of Body Systems comprised of:   Body structures and functions that impact the plan of care:      Lumbar spine and Shoulder.   Activity limitations that impact the plan of care are:      Bathing, Dressing, Grasping and Lifting.  3) Clinical presentation characteristics are:   Stable/Uncomplicated.  4) Decision-Making    Low complexity using standardized patient assessment instrument and/or measureable assessment of functional outcome.  Cumulative Therapy Evaluation is: Low complexity.    Previous and current functional limitations:  (See Goal Flow Sheet for this information)    Short term and Long term goals: (See Goal Flow Sheet for this information)     Communication ability:  Patient appears to be able to clearly communicate and understand verbal and written communication and follow directions correctly.  Treatment  Explanation - The following has been discussed with the patient:   RX ordered/plan of care  Anticipated outcomes  Possible risks and side effects  This patient would benefit from PT intervention to resume normal activities.   Rehab potential is good.    Frequency:  1 X week, once daily  Duration:  for 8 weeks  Discharge Plan:  Achieve all LTG.  Independent in home treatment program.  Reach maximal therapeutic benefit.    Please refer to the daily flowsheet for treatment today, total treatment time and time spent performing 1:1 timed codes.

## 2019-03-15 PROBLEM — M25.512 LEFT SHOULDER PAIN, UNSPECIFIED CHRONICITY: Status: RESOLVED | Noted: 2019-02-26 | Resolved: 2019-02-26

## 2019-03-15 ASSESSMENT — MIFFLIN-ST. JEOR: SCORE: 1627.87

## 2019-03-18 ENCOUNTER — SURGERY - HEALTHEAST (OUTPATIENT)
Dept: SURGERY | Facility: AMBULATORY SURGERY CENTER | Age: 49
End: 2019-03-18

## 2019-03-18 ASSESSMENT — MIFFLIN-ST. JEOR: SCORE: 1627.87

## 2019-03-20 DIAGNOSIS — Z87.39 HISTORY OF SERONEGATIVE INFLAMMATORY ARTHRITIS: ICD-10-CM

## 2019-03-28 DIAGNOSIS — Z87.39 HISTORY OF SERONEGATIVE INFLAMMATORY ARTHRITIS: ICD-10-CM

## 2019-03-28 NOTE — TELEPHONE ENCOUNTER
Requesting 90 day supply of Methotrexate - ordered 30 days with 2 refills on 2-20-19.      Kathleen M Doege RN

## 2019-04-23 ENCOUNTER — OFFICE VISIT (OUTPATIENT)
Dept: RHEUMATOLOGY | Facility: CLINIC | Age: 49
End: 2019-04-23
Attending: INTERNAL MEDICINE
Payer: COMMERCIAL

## 2019-04-23 ENCOUNTER — HOSPITAL ENCOUNTER (OUTPATIENT)
Dept: LAB | Facility: CLINIC | Age: 49
Discharge: HOME | End: 2019-04-23
Attending: INTERNAL MEDICINE | Admitting: INTERNAL MEDICINE
Payer: COMMERCIAL

## 2019-04-23 VITALS
BODY MASS INDEX: 41.45 KG/M2 | OXYGEN SATURATION: 91 % | WEIGHT: 233.9 LBS | DIASTOLIC BLOOD PRESSURE: 86 MMHG | SYSTOLIC BLOOD PRESSURE: 131 MMHG | HEART RATE: 84 BPM | HEIGHT: 63 IN

## 2019-04-23 DIAGNOSIS — Z87.39 HISTORY OF SERONEGATIVE INFLAMMATORY ARTHRITIS: ICD-10-CM

## 2019-04-23 DIAGNOSIS — M75.40 IMPINGEMENT SYNDROME OF SHOULDER REGION, UNSPECIFIED LATERALITY: ICD-10-CM

## 2019-04-23 DIAGNOSIS — Z87.39 HISTORY OF SERONEGATIVE INFLAMMATORY ARTHRITIS: Primary | ICD-10-CM

## 2019-04-23 LAB
ALT SERPL W P-5'-P-CCNC: 20 U/L (ref 0–50)
AST SERPL W P-5'-P-CCNC: 14 U/L (ref 0–45)
CREAT SERPL-MCNC: 0.81 MG/DL (ref 0.52–1.04)
CRP SERPL-MCNC: 15.6 MG/L (ref 0–8)
ERYTHROCYTE [DISTWIDTH] IN BLOOD BY AUTOMATED COUNT: 14 % (ref 10–15)
GFR SERPL CREATININE-BSD FRML MDRD: 86 ML/MIN/{1.73_M2}
HCT VFR BLD AUTO: 45.6 % (ref 35–47)
HGB BLD-MCNC: 15 G/DL (ref 11.7–15.7)
MCH RBC QN AUTO: 31.8 PG (ref 26.5–33)
MCHC RBC AUTO-ENTMCNC: 32.9 G/DL (ref 31.5–36.5)
MCV RBC AUTO: 97 FL (ref 78–100)
PLATELET # BLD AUTO: 299 10E9/L (ref 150–450)
RBC # BLD AUTO: 4.71 10E12/L (ref 3.8–5.2)
WBC # BLD AUTO: 9.9 10E9/L (ref 4–11)

## 2019-04-23 PROCEDURE — 84450 TRANSFERASE (AST) (SGOT): CPT | Performed by: INTERNAL MEDICINE

## 2019-04-23 PROCEDURE — G0463 HOSPITAL OUTPT CLINIC VISIT: HCPCS | Mod: ZF

## 2019-04-23 PROCEDURE — 86140 C-REACTIVE PROTEIN: CPT | Performed by: INTERNAL MEDICINE

## 2019-04-23 PROCEDURE — 36415 COLL VENOUS BLD VENIPUNCTURE: CPT | Performed by: INTERNAL MEDICINE

## 2019-04-23 PROCEDURE — 82565 ASSAY OF CREATININE: CPT | Performed by: INTERNAL MEDICINE

## 2019-04-23 PROCEDURE — 84460 ALANINE AMINO (ALT) (SGPT): CPT | Performed by: INTERNAL MEDICINE

## 2019-04-23 PROCEDURE — 85027 COMPLETE CBC AUTOMATED: CPT | Performed by: INTERNAL MEDICINE

## 2019-04-23 RX ORDER — PREDNISONE 5 MG/1
TABLET ORAL
Qty: 50 TABLET | Refills: 1 | Status: ON HOLD | OUTPATIENT
Start: 2019-04-23 | End: 2019-08-19 | Stop reason: ALTCHOICE

## 2019-04-23 ASSESSMENT — PAIN SCALES - GENERAL: PAINLEVEL: NO PAIN (0)

## 2019-04-23 ASSESSMENT — MIFFLIN-ST. JEOR: SCORE: 1660.09

## 2019-04-23 NOTE — LETTER
2019      RE: Zayra Ramirez  [address] [place] [address]       [facility]  Rheumatology Clinic  Panchito Saenz MD  2019     Name: Zayra Ramirez  MRN: [id number]  Age: 49 year old  : 1970  Referring provider: Aime Mason     Assessment and Plan:  #Seronegative RA  Patient relates continued shoulder- predominant pain with activity. Exam reveals tenderness at scattered MCPs and impingement in both shoulders. Blood work from  reveal normal liver function, blood counts, and uranalysis. CRP was minimally elevated at 12.4.     I think that prednisone-sensitive arthralgia does represent seronegative inflammatory arthropathy. I recommend continuing methotrexate, but increasing the dose to 22.5mg weekly. While awaiting onset of full methotrexate action, I recommend another course of corticosteroid. I recommend prednisone 15mg daily for 7 days, followed by 10mg daily for 7 days, then off. I emphasized the importance of physical therapy for management of impingement syndrome. I suggest daily performance of range of motion exercises and strengthening for both shoulder girdles. I recommended another course of physical therapy with 4 weekly visits and home exercise program instructions. I suggested a trial of flexeril 10 mg tid prn for foot cramps.    Orders:   - methotrexate 2.5 MG tablet  Dispense: 96 tablet; Refill: 1  - predniSONE (DELTASONE) 5 MG tablet  Dispense: 50 tablet; Refill: 1  - Creatinine  - CBC with platelets  - AST  - ALT  - CRP inflammation  - PHYSICAL THERAPY REFERRAL      Follow-up: Return in about 2 months (around 2019).     HPI:   Zayra Ramirez is a 49 year old female with a history of low back pain who presents for follow-up. I last saw her on 19 for consultation at which time she reported movement-associated left shoulder pain since 10/2018. She did not exhibit clear-cut constitutional symptoms. I prescribed her with a course of methotrexate 15mg  "weekly, a 25 day course of prednisone, and physical therapy.     Today, she reports increasing shoulder pain bilaterally and foot pain bilaterally. In regards to her shoulder pain, she localizes  tenderness a few inches below her shoulder joint. This is also accompanied with an intermittent \"tension\" in her neck. Pain is made worse with movement, both passive and active. Has not tried anything to treat her pain. She also reports pain on the medial aspect of bilateral feet that occurs spontaneously and lasts a few minutes at a time. While experiencing an onset of pain, she is unable to walk or stand. It also wakes her up from sleep at night. It is not specifically made worse with activity. She has not tried anything to treat her pain.     Denies small joint pain. Has a small amount of morning stiffness. She is continued on methotrexate once weekly. Tolerating the medication well. She also noted that the brief course of prednisone significantly alleviated her shoulder pain which returned about 2 weeks after stopping prednisone. Patient did not attend physical therapy, but she did proceed with pool therapy which she feels has helped.     Has no other consitutional symptoms. She continues to use oxygen at night.      Review of Systems:   Pertinent items are noted in HPI or as below, remainder of complete ROS is negative.      No recent problems with hearing or vision. No swallowing problems.   No breathing difficulty, shortness of breath, coughing, or wheezing  No chest pain or palpitations  No heart burn, indigestion, abdominal pain, nausea, vomiting, diarrhea  No urination problems, no bloody, cloudy urine, no dysuria  No numbing, tingling, weakness  No headaches or confusion  No rashes. No easy bleeding or bruising.     Active Medications:     Current Outpatient Medications:      albuterol (PROAIR HFA, PROVENTIL HFA, VENTOLIN HFA) 108 (90 BASE) MCG/ACT inhaler, Inhale 2 puffs into the lungs every 6 hours as needed " for shortness of breath / dyspnea or wheezing, Disp: , Rfl:      ARIPiprazole (ABILIFY PO), Take 10 mg by mouth daily., Disp: , Rfl:      DULOXETINE HCL PO, Take 120 mg by mouth daily, Disp: , Rfl:      fluticasone-salmeterol (ADVAIR) 500-50 MCG/DOSE diskus inhaler, Inhale 1 puff into the lungs every 12 hours, Disp: , Rfl:      HYDROcodone-acetaminophen (NORCO) 5-325 MG per tablet, Take 1-2 tablets by mouth every 4 hours as needed (Moderate to Severe Pain) (Patient not taking: Reported on 2/20/2019), Disp: 7 tablet, Rfl: 0     lisinopril-hydrochlorothiazide (PRINZIDE/ZESTORETIC) 20-25 MG per tablet, Take 1 tablet by mouth daily, Disp: 30 tablet, Rfl: 0     methotrexate 2.5 MG tablet, Take 6 tablets (15 mg) by mouth every 7 days Labs every 8 - 12 weeks for refills., Disp: 72 tablet, Rfl: 0     montelukast (SINGULAIR) 10 MG tablet, Take 10 mg by mouth At Bedtime, Disp: , Rfl:      NAPROXEN PO, Take 500 mg by mouth 2 times daily as needed for moderate pain, Disp: , Rfl:      OXYGEN-HELIUM IN, 2-3L PRN during the day, 3L @ night, Disp: , Rfl:      predniSONE (DELTASONE) 5 MG tablet, TAKE 1 TABLET BY MOUTH EVERY DAY WITH FOOD, Disp: , Rfl: 1     predniSONE (DELTASONE) 5 MG tablet, Take 3 tabs daily for 10 days, then 2 tabs daily for 10 days, then off., Disp: 50 tablet, Rfl: 1     ROPINIROLE HCL PO, Take 2 mg by mouth nightly as needed , Disp: , Rfl:      triamcinolone acetonide (KENALOG) 10 MG/ML injection, Inject 20mg today, Disp: 20 mL, Rfl: 0  No current facility-administered medications for this visit.     Facility-Administered Medications Ordered in Other Visits:      Lidocaine 1 % injection 0.5-5 mL, 0.5-5 mL, Other, Once PRN, Gutierrez Candelario MD     sodium chloride (PF) 0.9% PF flush 5-50 mL, 5-50 mL, Intracatheter, Once PRN, Gutierrez Candelario MD      Allergies:   Bee venom; Bees; Doxycycline; Erythromycin; and Hydrocodone-acetaminophen      Past Medical History:  Remarkable for moderate, persistent asthma,  "hypertension, bipolar II disorder, interstitial lung disease, cervical stenosis with myelopathy 2017.      Chronic midline low back pain  Facial cellulitis  Morbid (severe) obesity due to excess calories  Dental abscess  Hypoxia  Subcutaneous emphysema  Borderline personality disorder  Stenosis of cervical spine with myelopathy  Esophageal stricture  GERD  HTN (hypertension)  Interstitial lung disease  Moderate persistent asthma without complication  Onychomycosis  Restless leg syndrome  Seasonal allergies  Smoker  Stress  Respiratory bronchiolitis interstitial lung disease       Past Surgical History:  Remarkable for surgical fusion  Hysterectomy 1997  rectocele and Cystocele surgery        Family History:   Sister: Diabetes, thyroid disease  Mother: Rheumatoid arthritis  Father: Diabetes, alcoholism  No family history of psoriasis        Social History:   Former smoker (1 pack/day)       Physical Exam:   /86   Pulse 84   Ht 1.6 m (5' 3\")   Wt 106.1 kg (233 lb 14.4 oz)   LMP  (LMP Unknown)   SpO2 91%   BMI 41.43 kg/m      Wt Readings from Last 4 Encounters:   04/23/19 106.1 kg (233 lb 14.4 oz)   02/20/19 106.4 kg (234 lb 8 oz)   01/15/19 102.1 kg (225 lb)   11/27/18 103.9 kg (229 lb)     Constitutional: Well-developed, appearing stated age; cooperative  Eyes: Normal EOM, PERRLA, vision, conjunctiva, sclera  ENT: Normal external ears, nose, hearing, lips, teeth, gums, throat. No mucous membrane lesions, normal saliva pool  Neck: No mass or thyroid enlargement  Resp: Lungs clear to auscultation, nl to palpation  CV: RRR, no murmurs, rubs or gallops, no edema  GI: No ABD mass or tenderness, no HSM  : Not tested  Lymph: No cervical, supraclavicular, inguinal or epitrochlear nodes  MS: Tenderness at left elbow, but preserved ROM. Flexion is limited to 120 degrees in both shoulders. Pain with passive abduction of the shoulders. Tenderness at medial instep about 5cm below median malleolus on bilateral feet. " Tenderness with distraction of the bilateral feet. Normal MTPs.  Skin: No nail pitting, alopecia, rash, nodules or lesions  Neuro: Normal cranial nerves, strength, sensation, DTRs.   Psych: Normal judgement, orientation, memory, affect.     Laboratory:   RHEUM RESULTS Latest Ref Rng & Units 10/3/2018 2/20/2019 4/23/2019   SED RATE 0 - 20 mm/h - 10 -   CRP, INFLAMMATION 0.0 - 8.0 mg/L - 12.4(H) 15.6(H)   AST 0 - 45 U/L - 12 14   ALT 0 - 50 U/L - 15 20   ALBUMIN 3.4 - 5.0 g/dL - - -   WBC 4.0 - 11.0 10e9/L - 8.8 9.9   RBC 3.8 - 5.2 10e12/L - 4.50 4.71   HGB 11.7 - 15.7 g/dL 14.5 14.0 15.0   HCT 35.0 - 47.0 % - 42.7 45.6   MCV 78 - 100 fl - 95 97   MCHC 31.5 - 36.5 g/dL - 32.8 32.9   RDW 10.0 - 15.0 % - 14.6 14.0    - 450 10e9/L - 252 299   CREATININE 0.52 - 1.04 mg/dL 0.78 - 0.81   GFR ESTIMATE, IF BLACK >60 mL/min/[1.73:m2] >90 - >90   GFR ESTIMATE >60 mL/min/[1.73:m2] 79 - 86     Scribe Disclosure:  I, Ulices Anguiano, am serving as a scribe to document services personally performed by Panchito Saenz MD at this visit, based upon the provider's statements to me. All documentation has been reviewed by the aforementioned provider prior to being entered into the official medical record.      Panchito Saenz MD

## 2019-04-23 NOTE — PROGRESS NOTES
[facility]  Rheumatology Clinic  Panchito Saenz MD  2019     Name: Zayra Ramirez  MRN: [id number]  Age: 49 year old  : 1970  Referring provider: Aime Mason     Assessment and Plan:  #Seronegative RA  Patient relates continued shoulder- predominant pain with activity. Exam reveals tenderness at scattered MCPs and impingement in both shoulders. Blood work from  reveal normal liver function, blood counts, and uranalysis. CRP was minimally elevated at 12.4.     I think that prednisone-sensitive arthralgia does represent seronegative inflammatory arthropathy. I recommend continuing methotrexate, but increasing the dose to 22.5mg weekly. While awaiting onset of full methotrexate action, I recommend another course of corticosteroid. I recommend prednisone 15mg daily for 7 days, followed by 10mg daily for 7 days, then off. I emphasized the importance of physical therapy for management of impingement syndrome. I suggest daily performance of range of motion exercises and strengthening for both shoulder girdles. I recommended another course of physical therapy with 4 weekly visits and home exercise program instructions. I suggested a trial of flexeril 10 mg tid prn for foot cramps.    Orders:   - methotrexate 2.5 MG tablet  Dispense: 96 tablet; Refill: 1  - predniSONE (DELTASONE) 5 MG tablet  Dispense: 50 tablet; Refill: 1  - Creatinine  - CBC with platelets  - AST  - ALT  - CRP inflammation  - PHYSICAL THERAPY REFERRAL      Follow-up: Return in about 2 months (around 2019).     HPI:   Zayra Ramirez is a 49 year old female with a history of low back pain who presents for follow-up. I last saw her on 19 for consultation at which time she reported movement-associated left shoulder pain since 10/2018. She did not exhibit clear-cut constitutional symptoms. I prescribed her with a course of methotrexate 15mg weekly, a 25 day course of prednisone, and physical therapy.     Today, she reports  "increasing shoulder pain bilaterally and foot pain bilaterally. In regards to her shoulder pain, she localizes  tenderness a few inches below her shoulder joint. This is also accompanied with an intermittent \"tension\" in her neck. Pain is made worse with movement, both passive and active. Has not tried anything to treat her pain. She also reports pain on the medial aspect of bilateral feet that occurs spontaneously and lasts a few minutes at a time. While experiencing an onset of pain, she is unable to walk or stand. It also wakes her up from sleep at night. It is not specifically made worse with activity. She has not tried anything to treat her pain.     Denies small joint pain. Has a small amount of morning stiffness. She is continued on methotrexate once weekly. Tolerating the medication well. She also noted that the brief course of prednisone significantly alleviated her shoulder pain which returned about 2 weeks after stopping prednisone. Patient did not attend physical therapy, but she did proceed with pool therapy which she feels has helped.     Has no other consitutional symptoms. She continues to use oxygen at night.      Review of Systems:   Pertinent items are noted in HPI or as below, remainder of complete ROS is negative.      No recent problems with hearing or vision. No swallowing problems.   No breathing difficulty, shortness of breath, coughing, or wheezing  No chest pain or palpitations  No heart burn, indigestion, abdominal pain, nausea, vomiting, diarrhea  No urination problems, no bloody, cloudy urine, no dysuria  No numbing, tingling, weakness  No headaches or confusion  No rashes. No easy bleeding or bruising.     Active Medications:     Current Outpatient Medications:      albuterol (PROAIR HFA, PROVENTIL HFA, VENTOLIN HFA) 108 (90 BASE) MCG/ACT inhaler, Inhale 2 puffs into the lungs every 6 hours as needed for shortness of breath / dyspnea or wheezing, Disp: , Rfl:      ARIPiprazole (ABILIFY " PO), Take 10 mg by mouth daily., Disp: , Rfl:      DULOXETINE HCL PO, Take 120 mg by mouth daily, Disp: , Rfl:      fluticasone-salmeterol (ADVAIR) 500-50 MCG/DOSE diskus inhaler, Inhale 1 puff into the lungs every 12 hours, Disp: , Rfl:      HYDROcodone-acetaminophen (NORCO) 5-325 MG per tablet, Take 1-2 tablets by mouth every 4 hours as needed (Moderate to Severe Pain) (Patient not taking: Reported on 2/20/2019), Disp: 7 tablet, Rfl: 0     lisinopril-hydrochlorothiazide (PRINZIDE/ZESTORETIC) 20-25 MG per tablet, Take 1 tablet by mouth daily, Disp: 30 tablet, Rfl: 0     methotrexate 2.5 MG tablet, Take 6 tablets (15 mg) by mouth every 7 days Labs every 8 - 12 weeks for refills., Disp: 72 tablet, Rfl: 0     montelukast (SINGULAIR) 10 MG tablet, Take 10 mg by mouth At Bedtime, Disp: , Rfl:      NAPROXEN PO, Take 500 mg by mouth 2 times daily as needed for moderate pain, Disp: , Rfl:      OXYGEN-HELIUM IN, 2-3L PRN during the day, 3L @ night, Disp: , Rfl:      predniSONE (DELTASONE) 5 MG tablet, TAKE 1 TABLET BY MOUTH EVERY DAY WITH FOOD, Disp: , Rfl: 1     predniSONE (DELTASONE) 5 MG tablet, Take 3 tabs daily for 10 days, then 2 tabs daily for 10 days, then off., Disp: 50 tablet, Rfl: 1     ROPINIROLE HCL PO, Take 2 mg by mouth nightly as needed , Disp: , Rfl:      triamcinolone acetonide (KENALOG) 10 MG/ML injection, Inject 20mg today, Disp: 20 mL, Rfl: 0  No current facility-administered medications for this visit.     Facility-Administered Medications Ordered in Other Visits:      Lidocaine 1 % injection 0.5-5 mL, 0.5-5 mL, Other, Once PRN, Gutierrez Candelario MD     sodium chloride (PF) 0.9% PF flush 5-50 mL, 5-50 mL, Intracatheter, Once PRN, Gutierrez Candelario MD      Allergies:   Bee venom; Bees; Doxycycline; Erythromycin; and Hydrocodone-acetaminophen      Past Medical History:  Remarkable for moderate, persistent asthma, hypertension, bipolar II disorder, interstitial lung disease, cervical stenosis with myelopathy  "2017.      Chronic midline low back pain  Facial cellulitis  Morbid (severe) obesity due to excess calories  Dental abscess  Hypoxia  Subcutaneous emphysema  Borderline personality disorder  Stenosis of cervical spine with myelopathy  Esophageal stricture  GERD  HTN (hypertension)  Interstitial lung disease  Moderate persistent asthma without complication  Onychomycosis  Restless leg syndrome  Seasonal allergies  Smoker  Stress  Respiratory bronchiolitis interstitial lung disease       Past Surgical History:  Remarkable for surgical fusion  Hysterectomy 1997  rectocele and Cystocele surgery        Family History:   Sister: Diabetes, thyroid disease  Mother: Rheumatoid arthritis  Father: Diabetes, alcoholism  No family history of psoriasis        Social History:   Former smoker (1 pack/day)       Physical Exam:   /86   Pulse 84   Ht 1.6 m (5' 3\")   Wt 106.1 kg (233 lb 14.4 oz)   LMP  (LMP Unknown)   SpO2 91%   BMI 41.43 kg/m     Wt Readings from Last 4 Encounters:   04/23/19 106.1 kg (233 lb 14.4 oz)   02/20/19 106.4 kg (234 lb 8 oz)   01/15/19 102.1 kg (225 lb)   11/27/18 103.9 kg (229 lb)     Constitutional: Well-developed, appearing stated age; cooperative  Eyes: Normal EOM, PERRLA, vision, conjunctiva, sclera  ENT: Normal external ears, nose, hearing, lips, teeth, gums, throat. No mucous membrane lesions, normal saliva pool  Neck: No mass or thyroid enlargement  Resp: Lungs clear to auscultation, nl to palpation  CV: RRR, no murmurs, rubs or gallops, no edema  GI: No ABD mass or tenderness, no HSM  : Not tested  Lymph: No cervical, supraclavicular, inguinal or epitrochlear nodes  MS: Tenderness at left elbow, but preserved ROM. Flexion is limited to 120 degrees in both shoulders. Pain with passive abduction of the shoulders. Tenderness at medial instep about 5cm below median malleolus on bilateral feet. Tenderness with distraction of the bilateral feet. Normal MTPs.  Skin: No nail pitting, alopecia, " rash, nodules or lesions  Neuro: Normal cranial nerves, strength, sensation, DTRs.   Psych: Normal judgement, orientation, memory, affect.     Laboratory:   RHEUM RESULTS Latest Ref Rng & Units 10/3/2018 2/20/2019 4/23/2019   SED RATE 0 - 20 mm/h - 10 -   CRP, INFLAMMATION 0.0 - 8.0 mg/L - 12.4(H) 15.6(H)   AST 0 - 45 U/L - 12 14   ALT 0 - 50 U/L - 15 20   ALBUMIN 3.4 - 5.0 g/dL - - -   WBC 4.0 - 11.0 10e9/L - 8.8 9.9   RBC 3.8 - 5.2 10e12/L - 4.50 4.71   HGB 11.7 - 15.7 g/dL 14.5 14.0 15.0   HCT 35.0 - 47.0 % - 42.7 45.6   MCV 78 - 100 fl - 95 97   MCHC 31.5 - 36.5 g/dL - 32.8 32.9   RDW 10.0 - 15.0 % - 14.6 14.0    - 450 10e9/L - 252 299   CREATININE 0.52 - 1.04 mg/dL 0.78 - 0.81   GFR ESTIMATE, IF BLACK >60 mL/min/[1.73:m2] >90 - >90   GFR ESTIMATE >60 mL/min/[1.73:m2] 79 - 86     Scribe Disclosure:  Ulices RICO, am serving as a scribe to document services personally performed by Panchito Saenz MD at this visit, based upon the provider's statements to me. All documentation has been reviewed by the aforementioned provider prior to being entered into the official medical record.

## 2019-04-23 NOTE — PATIENT INSTRUCTIONS
Diagnosis:   1. Rheumatoid arthritis  2. Foot cramping  3. Bilateral shoulder pain--impingement syndrome  Plan:  - Repeat CRP, screening labwork  - Increase methotrexate to 8 tabs weekly  - Resume prednisone for 3 weeks (15 mg daily for 10 days, then 10 mg daily for 10 days, then off)  - Proceed with physical therapy for shoulder pain   - trial of muscle relaxant (flexeril 10 mg)  for foot pain up to 3 times daily

## 2019-04-26 ENCOUNTER — THERAPY VISIT (OUTPATIENT)
Dept: PHYSICAL THERAPY | Facility: CLINIC | Age: 49
End: 2019-04-26
Attending: INTERNAL MEDICINE
Payer: COMMERCIAL

## 2019-04-26 DIAGNOSIS — M25.512 BILATERAL SHOULDER PAIN: ICD-10-CM

## 2019-04-26 DIAGNOSIS — M25.511 BILATERAL SHOULDER PAIN: ICD-10-CM

## 2019-04-26 PROCEDURE — 97140 MANUAL THERAPY 1/> REGIONS: CPT | Mod: GP | Performed by: PHYSICAL THERAPIST

## 2019-04-26 PROCEDURE — 97110 THERAPEUTIC EXERCISES: CPT | Mod: GP | Performed by: PHYSICAL THERAPIST

## 2019-04-26 NOTE — PROGRESS NOTES
PROGRESS  REPORT    Progress reporting period is from 2/26/19 to 4/26/19.       SUBJECTIVE  Subjective changes noted by patient: Diagnosed with rheumathoid arthritis, now has pain in both shoulders. Noticing symptoms in both shoulders now. Hasn't done any of the exercises from last session. Pain is now unbearable at times, can notice pain up into her neck. Going back on prednisone, Has been on RA meds for ~2 months now.   Current pain level is 6/10.   Previous pain level was 6/10.   Changes in function:  None  Adverse reaction to treatment or activity: None    OBJECTIVE  Changes noted in objective findings:  See below:    Shoulder:   PROM L PROM R AROM L AROM R MMT L MMT R   Flex 115 deg  110 deg 120 deg     Abd 100 deg  100 deg 110 deg     IR 35 deg  30 deg 40 deg     ER 45 deg  40 deg 50 deg     Ext/IR           Palpation: reports tenderness throughout shoulder L>R - pec minor, posterior cuff especially tender    Special tests:   L R   Impingement     Neer's + +   Hawkin's-Deshaun + +   Biceps      Speed's + +   Rotator Cuff Tear     Drop Arm - -   Belly Press - -   Lift off  - -       ASSESSMENT/PLAN  Updated problem list and treatment plan: Diagnosis 1:  B) shoulder pain  Pain -  manual therapy, self management, education and home program  Decreased ROM/flexibility - manual therapy and therapeutic exercise  Decreased joint mobility - manual therapy and therapeutic exercise  Decreased strength - therapeutic exercise and therapeutic activities  Decreased proprioception - neuro re-education and therapeutic activities  Impaired muscle performance - neuro re-education  Decreased function - therapeutic activities  STG/LTGs have been met or progress has been made towards goals:  Yes (See Goal flow sheet completed today.)  Assessment of Progress: The patient's condition is unchanged. Patient has not performed assigned HEP, has noticed a worsening of symptoms over past couple of months with RA diagnosis. Demonstrates  some fear avoidance behaviors with reassessment today.  I have re-evaluated this patient and find that the nature, scope, duration and intensity of the therapy is appropriate for the medical condition of the patient.  Zayra continues to require the following intervention to meet STG and LTG's:  PT    Recommendations:  This patient would benefit from continued therapy.     Frequency:  2 X week, once daily x 3 weeks, then taper to 1x weekly x 5 weeks  Duration:  for 8 weeks        Please refer to the daily flowsheet for treatment today, total treatment time and time spent performing 1:1 timed codes.

## 2019-04-30 ENCOUNTER — THERAPY VISIT (OUTPATIENT)
Dept: PHYSICAL THERAPY | Facility: CLINIC | Age: 49
End: 2019-04-30
Payer: COMMERCIAL

## 2019-04-30 DIAGNOSIS — M25.512 BILATERAL SHOULDER PAIN: ICD-10-CM

## 2019-04-30 DIAGNOSIS — M25.511 BILATERAL SHOULDER PAIN: ICD-10-CM

## 2019-04-30 PROCEDURE — 97110 THERAPEUTIC EXERCISES: CPT | Mod: GP | Performed by: PHYSICAL THERAPIST

## 2019-04-30 PROCEDURE — 97140 MANUAL THERAPY 1/> REGIONS: CPT | Mod: GP | Performed by: PHYSICAL THERAPIST

## 2019-05-15 ENCOUNTER — OFFICE VISIT (OUTPATIENT)
Dept: RHEUMATOLOGY | Facility: CLINIC | Age: 49
End: 2019-05-15
Attending: INTERNAL MEDICINE
Payer: COMMERCIAL

## 2019-05-15 VITALS
SYSTOLIC BLOOD PRESSURE: 123 MMHG | BODY MASS INDEX: 41.99 KG/M2 | WEIGHT: 237 LBS | HEIGHT: 63 IN | DIASTOLIC BLOOD PRESSURE: 85 MMHG | TEMPERATURE: 98.8 F | HEART RATE: 78 BPM | OXYGEN SATURATION: 96 %

## 2019-05-15 DIAGNOSIS — Z87.39 HISTORY OF SERONEGATIVE INFLAMMATORY ARTHRITIS: ICD-10-CM

## 2019-05-15 PROCEDURE — G0463 HOSPITAL OUTPT CLINIC VISIT: HCPCS | Mod: ZF

## 2019-05-15 ASSESSMENT — PAIN SCALES - GENERAL: PAINLEVEL: MODERATE PAIN (5)

## 2019-05-15 ASSESSMENT — MIFFLIN-ST. JEOR: SCORE: 1674.15

## 2019-05-15 NOTE — LETTER
5/15/2019      RE: Zayra Ramirez  [address] [place] [address]       [facility]  Rheumatology Clinic  Panchito Saenz MD  05/15/2019     Name: Zayra Ramirez  MRN: [id number]  Age: 49 year old  : 1970  Referring provider: Aime Mason     Assessment and Plan:  # Seronegative inflammatory arthritis  Patient relates persistent left shoulder and bilateral hand pain. Exam shows painful left shoulder range of motion. White blood cell count and liver function were normal on 2019 but CRP was elevated at 15.6 from 2019.     Seronegative inflammatory arthritis remains active. Unfortunately recent gallbladder surgery prevented patient from efficiently increasing dose of methotrexate to therapeutic levels. I recommend now 15 mg weekly for 4 weeks, with a plan to increment the dose for 5 mg monthly for a maximum of 25 mg weekly. While on the medication, patient should undergo blood work every 3 months and limit alcohol to 2 drinks weekly. I recommend continuing physical therapy and exercises to maintain shoulder range of motion.     # Subacute left lower quadrant pain:   Patient is in obvious discomfort when rising to a sitting position. On the exam table today there is tenderness in the left lower quadrant but no rebound or guarding. Left hip range of motion is normal. CT abdomen pelvis from 05/10/2019 shows mildly prominent left inguinal lymph node, 1.4 x 1.3 cm. Bladder, rectum, and ovaries were unremarkable. No adnexal mass or free fluid.    I explained that I do not think that rheumatoid arthritis or medications are the cause of the patient's discomfort. Abdominal wall strain or trauma is the most likely cause of left lower quadrant pain. I urge the patient to follow up with primary care or with her surgical providers should the pain persist.     Follow-up: Return to clinic as scheduled in     HPI:   Zayra Ramirez is a 49 year old female with a history of low back pain who  presents for follow up. I last saw her on 04/23/2019, at which time she reported continued shoulder predominant pain with activity. Impression was that prednisone-sensitive arthralgia did represent seronegative inflammatory arthropathy. Plan was to continue methotrexate but increase to 22.5 mg weekly. A 2 week course of prednisone was also started. Physical therapy was recommended for management of impingement syndrome and a trial of flexeril was suggested for foot cramps.     Today, the patient reports significant pain in the left groin with walking and weight bearing. She is concerned that this may be related to an enlarged lymph node. This pain began after gallbladder surgery 2 weeks ago for a gallstone. She also continues to have shoulder pain, significantly worse on the left. She has continued on methotrexate 20 mg weekly but was never able to start the prednisone due to her surgery. She has worked with physical therapy and has been using her shoulder.     Review of Systems:   Pertinent items are noted in HPI or as below, remainder of complete ROS is negative.      No recent problems with hearing or vision. No swallowing problems.   No breathing difficulty, shortness of breath, coughing, or wheezing  No chest pain or palpitations  No heart burn, indigestion, abdominal pain, nausea, vomiting, diarrhea  No urination problems, no bloody, cloudy urine, no dysuria  No numbing, tingling, weakness  No headaches or confusion  No rashes. No easy bleeding or bruising.     Active Medications:     Current Outpatient Medications:      albuterol (PROAIR HFA, PROVENTIL HFA, VENTOLIN HFA) 108 (90 BASE) MCG/ACT inhaler, Inhale 2 puffs into the lungs every 6 hours as needed for shortness of breath / dyspnea or wheezing, Disp: , Rfl:      ARIPiprazole (ABILIFY PO), Take 10 mg by mouth daily., Disp: , Rfl:      DULOXETINE HCL PO, Take 120 mg by mouth daily, Disp: , Rfl:      fluticasone-salmeterol (ADVAIR) 500-50 MCG/DOSE diskus  inhaler, Inhale 1 puff into the lungs every 12 hours, Disp: , Rfl:      lisinopril-hydrochlorothiazide (PRINZIDE/ZESTORETIC) 20-25 MG per tablet, Take 1 tablet by mouth daily, Disp: 30 tablet, Rfl: 0     methotrexate 2.5 MG tablet, Take 9 tabs once weekly, Disp: 96 tablet, Rfl: 1     montelukast (SINGULAIR) 10 MG tablet, Take 10 mg by mouth At Bedtime, Disp: , Rfl:      NAPROXEN PO, Take 500 mg by mouth 2 times daily as needed for moderate pain, Disp: , Rfl:      OXYGEN-HELIUM IN, 2-3L PRN during the day, 3L @ night, Disp: , Rfl:      predniSONE (DELTASONE) 5 MG tablet, Take 3 tabs daily for 10 days, then 2 tabs daily for 10 days, then off., Disp: 50 tablet, Rfl: 1     predniSONE (DELTASONE) 5 MG tablet, TAKE 1 TABLET BY MOUTH EVERY DAY WITH FOOD, Disp: , Rfl: 1     ROPINIROLE HCL PO, Take 2 mg by mouth nightly as needed , Disp: , Rfl:      triamcinolone acetonide (KENALOG) 10 MG/ML injection, Inject 20mg today, Disp: 20 mL, Rfl: 0     HYDROcodone-acetaminophen (NORCO) 5-325 MG per tablet, Take 1-2 tablets by mouth every 4 hours as needed (Moderate to Severe Pain) (Patient not taking: Reported on 2/20/2019), Disp: 7 tablet, Rfl: 0  No current facility-administered medications for this visit.     Facility-Administered Medications Ordered in Other Visits:      Lidocaine 1 % injection 0.5-5 mL, 0.5-5 mL, Other, Once PRN, Gutierrez Candelario MD     sodium chloride (PF) 0.9% PF flush 5-50 mL, 5-50 mL, Intracatheter, Once PRN, Gutierrez Candelario MD      Allergies:   Bee venom; Bees; Doxycycline; Erythromycin; and Hydrocodone-acetaminophen     Past Medical History:  Remarkable for moderate, persistent asthma, hypertension, bipolar II disorder, interstitial lung disease, cervical stenosis with myelopathy 2017.      Chronic midline low back pain  Facial cellulitis  Morbid (severe) obesity due to excess calories  Dental abscess  Hypoxia  Subcutaneous emphysema  Borderline personality disorder  Stenosis of cervical spine with  "myelopathy  Esophageal stricture  Gastroesophageal reflux disease   Hypertension   Interstitial lung disease  Moderate persistent asthma without complication  Onychomycosis  Restless leg syndrome  Seasonal allergies  Smoker  Stress  Respiratory bronchiolitis interstitial lung disease     Past Surgical History:  Remarkable for surgical fusion  Hysterectomy 1997  rectocele and Cystocele surgery  Cholecystectomy      Family History:   Sister: Diabetes, thyroid disease  Mother: Rheumatoid arthritis  Father: Diabetes, alcoholism  No family history of psoriasis      Social History:   Former smoker (1 pack/day)     Physical Exam:   /85   Pulse 78   Temp 98.8  F (37.1  C) (Oral)   Ht 1.6 m (5' 3\")   Wt 107.5 kg (237 lb)   LMP  (LMP Unknown)   SpO2 96%   BMI 41.98 kg/m      Wt Readings from Last 4 Encounters:   05/15/19 107.5 kg (237 lb)   04/23/19 106.1 kg (233 lb 14.4 oz)   02/20/19 106.4 kg (234 lb 8 oz)   01/15/19 102.1 kg (225 lb)     Constitutional: Well-developed, appearing stated age; cooperative  Eyes: Normal EOM, PERRLA, vision, conjunctiva, sclera  ENT: Normal external ears, nose, hearing, lips, teeth, gums, throat. No mucous membrane lesions, normal saliva pool  Neck: No mass or thyroid enlargement  Resp: Lungs clear to auscultation, nl to palpation  CV: RRR, no murmurs, rubs or gallops, no edema  GI: 4 well healed port scars from recent surgery on the abdomen. Belly is soft without rebound or guarding. Point tenderness in the left lower quadrant.   : Not tested  Lymph: No cervical, supraclavicular, inguinal or epitrochlear nodes  MS: Excellent  strength but no asymmetric or visile swelling in the joints of the hands or fingers. Left shoulder limited beyond 90  due to pain.   Skin: No nail pitting, alopecia, rash, nodules or lesions  Neuro: Normal cranial nerves, strength, sensation, DTRs.   Psych: Normal judgement, orientation, memory, affect.     Imaging:   CT abdominal pelvis without " contrast 05/10/2019:  No acute changes in the abdomen or pelvis to account for   patient's symptoms.  No evidence of bowel obstruction, diverticulitis   or appendicitis.  No evidence of abdominal wall hernia.  Single   mildly enlarged left inguinal lymph node.  Per radiology.     Laboratory:   RHEUM RESULTS Latest Ref Rng & Units 10/3/2018 2/20/2019 4/23/2019   SED RATE 0 - 20 mm/h - 10 -   CRP, INFLAMMATION 0.0 - 8.0 mg/L - 12.4(H) 15.6(H)   AST 0 - 45 U/L - 12 14   ALT 0 - 50 U/L - 15 20   ALBUMIN 3.4 - 5.0 g/dL - - -   WBC 4.0 - 11.0 10e9/L - 8.8 9.9   RBC 3.8 - 5.2 10e12/L - 4.50 4.71   HGB 11.7 - 15.7 g/dL 14.5 14.0 15.0   HCT 35.0 - 47.0 % - 42.7 45.6   MCV 78 - 100 fl - 95 97   MCHC 31.5 - 36.5 g/dL - 32.8 32.9   RDW 10.0 - 15.0 % - 14.6 14.0    - 450 10e9/L - 252 299   CREATININE 0.52 - 1.04 mg/dL 0.78 - 0.81   GFR ESTIMATE, IF BLACK >60 mL/min/[1.73:m2] >90 - >90   GFR ESTIMATE >60 mL/min/[1.73:m2] 79 - 86     Scribe Disclosure:  IBrittani, am serving as a scribe to document services personally performed by Panchito aSenz MD at this visit, based upon the provider's statements to me. All documentation has been reviewed by the aforementioned provider prior to being entered into the official medical record.    Panchito Saenz MD

## 2019-05-15 NOTE — PATIENT INSTRUCTIONS
Diagnosis: rheumatoid arthritis   Abdominal pain: likely abdominal wall muscle caused. Not due to lymph nodes or rheumatoid arthritis.    Plan:   - Increase methotrexate from 8 to 9 tablets. Use folic acid 1 mg daily   - Start prednisone (15 mg daily for 10 days, then 10 mg daily for 10 days, then off) as we discussed.  - Follow-up with primary care or the surgical team regarding abdominal pain.

## 2019-05-15 NOTE — PROGRESS NOTES
[facility]  Rheumatology Clinic  Pnachito Saenz MD  05/15/2019     Name: Zayra Ramirez  MRN: [id number]  Age: 49 year old  : 1970  Referring provider: Aime Mason     Assessment and Plan:  # Seronegative inflammatory arthritis  Patient relates persistent left shoulder and bilateral hand pain. Exam shows painful left shoulder range of motion. White blood cell count and liver function were normal on 2019 but CRP was elevated at 15.6 from 2019.     Seronegative inflammatory arthritis remains active. Unfortunately recent gallbladder surgery prevented patient from efficiently increasing dose of methotrexate to therapeutic levels. I recommend now 15 mg weekly for 4 weeks, with a plan to increment the dose for 5 mg monthly for a maximum of 25 mg weekly. While on the medication, patient should undergo blood work every 3 months and limit alcohol to 2 drinks weekly. I recommend continuing physical therapy and exercises to maintain shoulder range of motion.     # Subacute left lower quadrant pain:   Patient is in obvious discomfort when rising to a sitting position. On the exam table today there is tenderness in the left lower quadrant but no rebound or guarding. Left hip range of motion is normal. CT abdomen pelvis from 05/10/2019 shows mildly prominent left inguinal lymph node, 1.4 x 1.3 cm. Bladder, rectum, and ovaries were unremarkable. No adnexal mass or free fluid.    I explained that I do not think that rheumatoid arthritis or medications are the cause of the patient's discomfort. Abdominal wall strain or trauma is the most likely cause of left lower quadrant pain. I urge the patient to follow up with primary care or with her surgical providers should the pain persist.     Follow-up: Return to clinic as scheduled in     HPI:   Zayra Ramirez is a 49 year old female with a history of low back pain who presents for follow up. I last saw her on 2019, at which time she reported continued  shoulder predominant pain with activity. Impression was that prednisone-sensitive arthralgia did represent seronegative inflammatory arthropathy. Plan was to continue methotrexate but increase to 22.5 mg weekly. A 2 week course of prednisone was also started. Physical therapy was recommended for management of impingement syndrome and a trial of flexeril was suggested for foot cramps.     Today, the patient reports significant pain in the left groin with walking and weight bearing. She is concerned that this may be related to an enlarged lymph node. This pain began after gallbladder surgery 2 weeks ago for a gallstone. She also continues to have shoulder pain, significantly worse on the left. She has continued on methotrexate 20 mg weekly but was never able to start the prednisone due to her surgery. She has worked with physical therapy and has been using her shoulder.     Review of Systems:   Pertinent items are noted in HPI or as below, remainder of complete ROS is negative.      No recent problems with hearing or vision. No swallowing problems.   No breathing difficulty, shortness of breath, coughing, or wheezing  No chest pain or palpitations  No heart burn, indigestion, abdominal pain, nausea, vomiting, diarrhea  No urination problems, no bloody, cloudy urine, no dysuria  No numbing, tingling, weakness  No headaches or confusion  No rashes. No easy bleeding or bruising.     Active Medications:     Current Outpatient Medications:      albuterol (PROAIR HFA, PROVENTIL HFA, VENTOLIN HFA) 108 (90 BASE) MCG/ACT inhaler, Inhale 2 puffs into the lungs every 6 hours as needed for shortness of breath / dyspnea or wheezing, Disp: , Rfl:      ARIPiprazole (ABILIFY PO), Take 10 mg by mouth daily., Disp: , Rfl:      DULOXETINE HCL PO, Take 120 mg by mouth daily, Disp: , Rfl:      fluticasone-salmeterol (ADVAIR) 500-50 MCG/DOSE diskus inhaler, Inhale 1 puff into the lungs every 12 hours, Disp: , Rfl:       lisinopril-hydrochlorothiazide (PRINZIDE/ZESTORETIC) 20-25 MG per tablet, Take 1 tablet by mouth daily, Disp: 30 tablet, Rfl: 0     methotrexate 2.5 MG tablet, Take 9 tabs once weekly, Disp: 96 tablet, Rfl: 1     montelukast (SINGULAIR) 10 MG tablet, Take 10 mg by mouth At Bedtime, Disp: , Rfl:      NAPROXEN PO, Take 500 mg by mouth 2 times daily as needed for moderate pain, Disp: , Rfl:      OXYGEN-HELIUM IN, 2-3L PRN during the day, 3L @ night, Disp: , Rfl:      predniSONE (DELTASONE) 5 MG tablet, Take 3 tabs daily for 10 days, then 2 tabs daily for 10 days, then off., Disp: 50 tablet, Rfl: 1     predniSONE (DELTASONE) 5 MG tablet, TAKE 1 TABLET BY MOUTH EVERY DAY WITH FOOD, Disp: , Rfl: 1     ROPINIROLE HCL PO, Take 2 mg by mouth nightly as needed , Disp: , Rfl:      triamcinolone acetonide (KENALOG) 10 MG/ML injection, Inject 20mg today, Disp: 20 mL, Rfl: 0     HYDROcodone-acetaminophen (NORCO) 5-325 MG per tablet, Take 1-2 tablets by mouth every 4 hours as needed (Moderate to Severe Pain) (Patient not taking: Reported on 2/20/2019), Disp: 7 tablet, Rfl: 0  No current facility-administered medications for this visit.     Facility-Administered Medications Ordered in Other Visits:      Lidocaine 1 % injection 0.5-5 mL, 0.5-5 mL, Other, Once PRN, Gutierrez Candelario MD     sodium chloride (PF) 0.9% PF flush 5-50 mL, 5-50 mL, Intracatheter, Once PRN, Gtuierrez Candelario MD      Allergies:   Bee venom; Bees; Doxycycline; Erythromycin; and Hydrocodone-acetaminophen     Past Medical History:  Remarkable for moderate, persistent asthma, hypertension, bipolar II disorder, interstitial lung disease, cervical stenosis with myelopathy 2017.      Chronic midline low back pain  Facial cellulitis  Morbid (severe) obesity due to excess calories  Dental abscess  Hypoxia  Subcutaneous emphysema  Borderline personality disorder  Stenosis of cervical spine with myelopathy  Esophageal stricture  Gastroesophageal reflux disease  "  Hypertension   Interstitial lung disease  Moderate persistent asthma without complication  Onychomycosis  Restless leg syndrome  Seasonal allergies  Smoker  Stress  Respiratory bronchiolitis interstitial lung disease     Past Surgical History:  Remarkable for surgical fusion  Hysterectomy 1997  rectocele and Cystocele surgery  Cholecystectomy      Family History:   Sister: Diabetes, thyroid disease  Mother: Rheumatoid arthritis  Father: Diabetes, alcoholism  No family history of psoriasis      Social History:   Former smoker (1 pack/day)     Physical Exam:   /85   Pulse 78   Temp 98.8  F (37.1  C) (Oral)   Ht 1.6 m (5' 3\")   Wt 107.5 kg (237 lb)   LMP  (LMP Unknown)   SpO2 96%   BMI 41.98 kg/m     Wt Readings from Last 4 Encounters:   05/15/19 107.5 kg (237 lb)   04/23/19 106.1 kg (233 lb 14.4 oz)   02/20/19 106.4 kg (234 lb 8 oz)   01/15/19 102.1 kg (225 lb)     Constitutional: Well-developed, appearing stated age; cooperative  Eyes: Normal EOM, PERRLA, vision, conjunctiva, sclera  ENT: Normal external ears, nose, hearing, lips, teeth, gums, throat. No mucous membrane lesions, normal saliva pool  Neck: No mass or thyroid enlargement  Resp: Lungs clear to auscultation, nl to palpation  CV: RRR, no murmurs, rubs or gallops, no edema  GI: 4 well healed port scars from recent surgery on the abdomen. Belly is soft without rebound or guarding. Point tenderness in the left lower quadrant.   : Not tested  Lymph: No cervical, supraclavicular, inguinal or epitrochlear nodes  MS: Excellent  strength but no asymmetric or visile swelling in the joints of the hands or fingers. Left shoulder limited beyond 90  due to pain.   Skin: No nail pitting, alopecia, rash, nodules or lesions  Neuro: Normal cranial nerves, strength, sensation, DTRs.   Psych: Normal judgement, orientation, memory, affect.     Imaging:   CT abdominal pelvis without contrast 05/10/2019:  No acute changes in the abdomen or pelvis to account " for   patient's symptoms.  No evidence of bowel obstruction, diverticulitis   or appendicitis.  No evidence of abdominal wall hernia.  Single   mildly enlarged left inguinal lymph node.  Per radiology.     Laboratory:   RHEUM RESULTS Latest Ref Rng & Units 10/3/2018 2/20/2019 4/23/2019   SED RATE 0 - 20 mm/h - 10 -   CRP, INFLAMMATION 0.0 - 8.0 mg/L - 12.4(H) 15.6(H)   AST 0 - 45 U/L - 12 14   ALT 0 - 50 U/L - 15 20   ALBUMIN 3.4 - 5.0 g/dL - - -   WBC 4.0 - 11.0 10e9/L - 8.8 9.9   RBC 3.8 - 5.2 10e12/L - 4.50 4.71   HGB 11.7 - 15.7 g/dL 14.5 14.0 15.0   HCT 35.0 - 47.0 % - 42.7 45.6   MCV 78 - 100 fl - 95 97   MCHC 31.5 - 36.5 g/dL - 32.8 32.9   RDW 10.0 - 15.0 % - 14.6 14.0    - 450 10e9/L - 252 299   CREATININE 0.52 - 1.04 mg/dL 0.78 - 0.81   GFR ESTIMATE, IF BLACK >60 mL/min/[1.73:m2] >90 - >90   GFR ESTIMATE >60 mL/min/[1.73:m2] 79 - 86     Scribe Disclosure:  Brittani RICO, am serving as a scribe to document services personally performed by Panchito Saenz MD at this visit, based upon the provider's statements to me. All documentation has been reviewed by the aforementioned provider prior to being entered into the official medical record.

## 2019-05-15 NOTE — NURSING NOTE
"Chief Complaint   Patient presents with     RECHECK     RA     /85   Pulse 78   Temp 98.8  F (37.1  C) (Oral)   Ht 1.6 m (5' 3\")   Wt 107.5 kg (237 lb)   LMP  (LMP Unknown)   SpO2 96%   BMI 41.98 kg/m    Neema Fernandez    "

## 2019-06-12 PROBLEM — M25.511 BILATERAL SHOULDER PAIN: Status: RESOLVED | Noted: 2019-04-26 | Resolved: 2019-04-30

## 2019-06-12 PROBLEM — M25.512 BILATERAL SHOULDER PAIN: Status: RESOLVED | Noted: 2019-04-26 | Resolved: 2019-04-30

## 2019-06-12 NOTE — PROGRESS NOTES
Zayra Ramirez was seen 3 times for evaluation and treatment.  Patient did not return for further treatment and current status is unknown.  Due to short treatment duration, no objective or functional changes were made.  Please see goal flow sheet from episode noted date below and initial evaluation for further information.    Linked Episodes   Type: Episode: Status: Noted: Resolved: Last update: Updated by:   PHYSICAL THERAPY B) Shoulder Active 2/26/2019 4/30/2019  7:34 AM Sabine Johnson, PT      Comments:

## 2019-06-28 ENCOUNTER — OFFICE VISIT (OUTPATIENT)
Dept: RHEUMATOLOGY | Facility: CLINIC | Age: 49
End: 2019-06-28
Attending: INTERNAL MEDICINE
Payer: COMMERCIAL

## 2019-06-28 VITALS
OXYGEN SATURATION: 97 % | WEIGHT: 250 LBS | DIASTOLIC BLOOD PRESSURE: 83 MMHG | SYSTOLIC BLOOD PRESSURE: 129 MMHG | BODY MASS INDEX: 44.29 KG/M2 | HEART RATE: 94 BPM

## 2019-06-28 DIAGNOSIS — Z87.39 HISTORY OF SERONEGATIVE INFLAMMATORY ARTHRITIS: Primary | ICD-10-CM

## 2019-06-28 DIAGNOSIS — Z87.39 HISTORY OF SERONEGATIVE INFLAMMATORY ARTHRITIS: ICD-10-CM

## 2019-06-28 LAB
ALT SERPL W P-5'-P-CCNC: 58 U/L (ref 0–50)
AST SERPL W P-5'-P-CCNC: 35 U/L (ref 0–45)
CREAT SERPL-MCNC: 0.76 MG/DL (ref 0.52–1.04)
ERYTHROCYTE [DISTWIDTH] IN BLOOD BY AUTOMATED COUNT: 14.9 % (ref 10–15)
GFR SERPL CREATININE-BSD FRML MDRD: >90 ML/MIN/{1.73_M2}
HCT VFR BLD AUTO: 42 % (ref 35–47)
HGB BLD-MCNC: 13.6 G/DL (ref 11.7–15.7)
MCH RBC QN AUTO: 31.9 PG (ref 26.5–33)
MCHC RBC AUTO-ENTMCNC: 32.4 G/DL (ref 31.5–36.5)
MCV RBC AUTO: 98 FL (ref 78–100)
PLATELET # BLD AUTO: 306 10E9/L (ref 150–450)
RBC # BLD AUTO: 4.27 10E12/L (ref 3.8–5.2)
WBC # BLD AUTO: 10.2 10E9/L (ref 4–11)

## 2019-06-28 PROCEDURE — 84460 ALANINE AMINO (ALT) (SGPT): CPT | Performed by: INTERNAL MEDICINE

## 2019-06-28 PROCEDURE — 84450 TRANSFERASE (AST) (SGOT): CPT | Performed by: INTERNAL MEDICINE

## 2019-06-28 PROCEDURE — 82565 ASSAY OF CREATININE: CPT | Performed by: INTERNAL MEDICINE

## 2019-06-28 PROCEDURE — 85027 COMPLETE CBC AUTOMATED: CPT | Performed by: INTERNAL MEDICINE

## 2019-06-28 PROCEDURE — 36415 COLL VENOUS BLD VENIPUNCTURE: CPT | Performed by: INTERNAL MEDICINE

## 2019-06-28 RX ORDER — FOLIC ACID 1 MG/1
1 TABLET ORAL DAILY
Qty: 90 TABLET | Refills: 3 | Status: SHIPPED | OUTPATIENT
Start: 2019-06-28 | End: 2020-05-27

## 2019-06-28 RX ORDER — CYCLOBENZAPRINE HCL 10 MG
10 TABLET ORAL EVERY EVENING
Status: ON HOLD | COMMUNITY
Start: 2019-02-21 | End: 2019-08-22

## 2019-06-28 RX ORDER — FERROUS SULFATE 325(65) MG
325 TABLET ORAL
Refills: 0 | COMMUNITY
Start: 2019-04-25 | End: 2022-01-17

## 2019-06-28 NOTE — LETTER
2019       RE: Zayra Ramirez  [address] [place] [address]     Dear Colleague,    Thank you for referring your patient, Zayra Ramirez, to the [facility] RHEUMATOLOGY at [facility]. Please see a copy of my visit note below.    [facility]  Rheumatology Clinic  Panchito Saenz MD  2019     Name: Zayra Ramirez  MRN: [id number]  Age: 49 year old  : 1970  Referring provider: Aime Mason     Assessment and Plan:  #Seronegative inflammatory arthritis  Patient relates significant reduction in small joint predominant pain and stiffness. Exam reveals trace synovial thickening at bilateral 3rd PIPs and tenderness without synovitis at the left thumb MP. Blood work on  reveals normal creatinine, LFTs, CBC were all normal. CPR was 15.6, elevated.     Inflammatory arthritis is much improved since methotrexate dose escalation, at 22.5mg weekly for the past 4 weeks. I recommend continuing methotrexate 22.5mg weekly. No additional prednisone is necessary. Acetaminophen 500mg may be used for residual joint pain. Naproxen 440 mg prn Bid. While on prednisone:  --q 3 mo AST/ALT, Albumin, CBC with plts  --Limit EtOH intake to 2 drinks weekly; use folate 1 mg daily.  --Tylenol 500 mg tid prn nausea/HA associated with dosing.    #Left shoulder pain:  Previous symptoms of movement associated left shoulder pain are greatly reduced. I think that physical therapy and home exercise program have been instrumental in reducing pain associated with left impingement syndrome. I recommend continuing home exercise program once or twice daily as directed by physical therapy.    Orders:  - folic acid (FOLVITE) 1 MG tablet  Dispense: 90 tablet; Refill: 3  - methotrexate 2.5 MG tablet  Dispense: 96 tablet; Refill: 2  - Creatinine  - ALT  - AST  - CBC with platelets        Follow-up: Return in about 4 months (around 10/28/2019).     HPI:   Zayra Ramirez is a 49 year old  female with a history of low back pain who presents for follow up. I last evaluated her on 5/15/19 at which time she reported persistent left shoulder and bilateral hand pain. I recommended she increase methotrexate dose to 15mg weekly and 4 weeks and plan to increment the dose for 5mg monthly for a maximum of 25mg weekly.     Today, she reports that her joint pain has improved overall. She still has some pain in bilateral 3rd fingers. She also reports persistent left shoulder pain in the setting of impingement syndrome and is continuing with home therapy per the instruction of her physical therapist. Besides mild abdominal pain, she has no other concerns. Denies mouth or nose sores. Gilma nausea or vomiting. Continued on methotrexate as of 4 weeks ago. She is continued on naproxen twice daily.       Review of Systems:   Pertinent items are noted in HPI or as below, remainder of complete ROS is negative.      No recent problems with hearing or vision. No swallowing problems.   No breathing difficulty, shortness of breath, coughing, or wheezing  No chest pain or palpitations  No heart burn, indigestion, nausea, vomiting, diarrhea  No urination problems, no bloody, cloudy urine, no dysuria  No numbing, tingling, weakness  No headaches or confusion  No rashes. No easy bleeding or bruising.       Active Medications:   Current Outpatient Medications:      albuterol (PROAIR HFA, PROVENTIL HFA, VENTOLIN HFA) 108 (90 BASE) MCG/ACT inhaler, Inhale 2 puffs into the lungs every 6 hours as needed for shortness of breath / dyspnea or wheezing, Disp: , Rfl:      ARIPiprazole (ABILIFY PO), Take 10 mg by mouth daily., Disp: , Rfl:      cholecalciferol ( ULTRA STRENGTH) 2000 units CAPS, TAKE ONE CAPSULE BY MOUTH DAILY IN AM, Disp: , Rfl:      cyclobenzaprine (FLEXERIL) 10 MG tablet, Take 10 mg by mouth, Disp: , Rfl:      DULOXETINE HCL PO, Take 120 mg by mouth daily, Disp: , Rfl:      fluticasone-salmeterol (ADVAIR) 500-50  MCG/DOSE diskus inhaler, Inhale 1 puff into the lungs every 12 hours, Disp: , Rfl:      folic acid (FOLVITE) 1 MG tablet, Take 1 tablet (1 mg) by mouth daily, Disp: 90 tablet, Rfl: 3     lisinopril-hydrochlorothiazide (PRINZIDE/ZESTORETIC) 20-25 MG per tablet, Take 1 tablet by mouth daily, Disp: 30 tablet, Rfl: 0     methotrexate 2.5 MG tablet, Take 9 tabs once weekly, Disp: 96 tablet, Rfl: 2     montelukast (SINGULAIR) 10 MG tablet, Take 10 mg by mouth At Bedtime, Disp: , Rfl:      NAPROXEN PO, Take 500 mg by mouth 2 times daily as needed for moderate pain, Disp: , Rfl:      OXYGEN-HELIUM IN, 2-3L PRN during the day, 3L @ night, Disp: , Rfl:      predniSONE (DELTASONE) 5 MG tablet, Take 3 tabs daily for 10 days, then 2 tabs daily for 10 days, then off., Disp: 50 tablet, Rfl: 1     predniSONE (DELTASONE) 5 MG tablet, TAKE 1 TABLET BY MOUTH EVERY DAY WITH FOOD, Disp: , Rfl: 1     ROPINIROLE HCL PO, Take 2 mg by mouth nightly as needed , Disp: , Rfl:      triamcinolone acetonide (KENALOG) 10 MG/ML injection, Inject 20mg today, Disp: 20 mL, Rfl: 0     ferrous sulfate (FEROSUL) 325 (65 Fe) MG tablet, Take 325 mg by mouth daily, Disp: , Rfl: 0     HYDROcodone-acetaminophen (NORCO) 5-325 MG per tablet, Take 1-2 tablets by mouth every 4 hours as needed (Moderate to Severe Pain) (Patient not taking: Reported on 2/20/2019), Disp: 7 tablet, Rfl: 0  No current facility-administered medications for this visit.     Facility-Administered Medications Ordered in Other Visits:      Lidocaine 1 % injection 0.5-5 mL, 0.5-5 mL, Other, Once PRN, Gutierrez Candelario MD     sodium chloride (PF) 0.9% PF flush 5-50 mL, 5-50 mL, Intracatheter, Once PRN, Gutierrez Candelario MD      Allergies:   Bee venom; Bees; Doxycycline; Erythromycin; and Hydrocodone-acetaminophen      Past Medical History:  Remarkable for moderate, persistent asthma, hypertension, bipolar II disorder, interstitial lung disease, cervical stenosis with myelopathy 2017.      Chronic  midline low back pain  Facial cellulitis  Morbid (severe) obesity due to excess calories  Dental abscess  Hypoxia  Subcutaneous emphysema  Borderline personality disorder  Stenosis of cervical spine with myelopathy  Esophageal stricture  Gastroesophageal reflux disease   Hypertension   Interstitial lung disease  Moderate persistent asthma without complication  Onychomycosis  Restless leg syndrome  Seasonal allergies  Smoker  Stress  Respiratory bronchiolitis interstitial lung disease     Past Surgical History:  Remarkable for surgical fusion  Hysterectomy 1997  rectocele and Cystocele surgery  Cholecystectomy     Family History:   Sister: Diabetes, thyroid disease  Mother: Rheumatoid arthritis  Father: Diabetes, alcoholism  No family history of psoriasis       Social History:   Former smoker (1 pack/day)     Physical Exam:   /83   Pulse 94   Wt 113.4 kg (250 lb)   LMP  (LMP Unknown)   SpO2 97%   BMI 44.29 kg/m      Wt Readings from Last 4 Encounters:   06/28/19 113.4 kg (250 lb)   05/15/19 107.5 kg (237 lb)   04/23/19 106.1 kg (233 lb 14.4 oz)   02/20/19 106.4 kg (234 lb 8 oz)     Constitutional: Well-developed, appearing stated age; cooperative  Eyes: Normal EOM, PERRLA, vision, conjunctiva, sclera  ENT: Normal external ears, nose, hearing, lips, teeth, gums, throat. No mucous membrane lesions, normal saliva pool. 1/2cm of depressed white area near molar.   Neck: No mass or thyroid enlargement  Resp: Lungs clear to auscultation, nl to palpation  CV: RRR, no murmurs, rubs or gallops, no edema  GI: No ABD mass or tenderness, no HSM  : Not tested  Lymph: No cervical, supraclavicular, inguinal or epitrochlear nodes  MS: The TMJ, neck, shoulder, elbow, wrist, spine, hip, knee, ankle, and foot MTP/IP joints were examined and found normal. No active synovitis or altered joint anatomy. Full joint ROM. Normal  strength. No dactylitis,  tenosynovitis, enthesopathy. Fist formation is near complete  bilaterally. Mild swelling of the proximal aspect of the right 3rd digit. Tenderness at right 3rd PIP. Other MCPs and PIPs on the right are normal. Left 3rd PIP is slightly tender. Thumb MP on the left is tender.   Skin: No nail pitting, alopecia, rash, nodules or lesions  Neuro: Normal cranial nerves, strength, sensation, DTRs.   Psych: Normal judgement, orientation, memory, affect.     Laboratory:   RHEUM RESULTS Latest Ref Rng & Units 2/20/2019 4/23/2019 6/28/2019   SED RATE 0 - 20 mm/h 10 - -   CRP, INFLAMMATION 0.0 - 8.0 mg/L 12.4(H) 15.6(H) -   AST 0 - 45 U/L 12 14 35   ALT 0 - 50 U/L 15 20 58(H)   ALBUMIN 3.4 - 5.0 g/dL - - -   WBC 4.0 - 11.0 10e9/L 8.8 9.9 10.2   RBC 3.8 - 5.2 10e12/L 4.50 4.71 4.27   HGB 11.7 - 15.7 g/dL 14.0 15.0 13.6   HCT 35.0 - 47.0 % 42.7 45.6 42.0   MCV 78 - 100 fl 95 97 98   MCHC 31.5 - 36.5 g/dL 32.8 32.9 32.4   RDW 10.0 - 15.0 % 14.6 14.0 14.9    - 450 10e9/L 252 299 306   CREATININE 0.52 - 1.04 mg/dL - 0.81 0.76   GFR ESTIMATE, IF BLACK >60 mL/min/[1.73:m2] - >90 >90   GFR ESTIMATE >60 mL/min/[1.73:m2] - 86 >90                  Scribe Disclosure:  Ulices RICO, am serving as a scribe to document services personally performed by Panchito Saenz MD at this visit, based upon the provider's statements to me. All documentation has been reviewed by the aforementioned provider prior to being entered into the official medical record.     Ulices RICO, a scribe, prepared the chart for today's encounter.       Again, thank you for allowing me to participate in the care of your patient.      Sincerely,    Panchito Saenz MD

## 2019-06-28 NOTE — PROGRESS NOTES
[facility]  Rheumatology Clinic  Panchito Saenz MD  2019     Name: Zayra Ramirez  MRN: [id number]  Age: 49 year old  : 1970  Referring provider: Aime Mason     Assessment and Plan:  #Seronegative inflammatory arthritis  Patient relates significant reduction in small joint predominant pain and stiffness. Exam reveals trace synovial thickening at bilateral 3rd PIPs and tenderness without synovitis at the left thumb MP. Blood work on  reveals normal creatinine, LFTs, CBC were all normal. CPR was 15.6, elevated.     Inflammatory arthritis is much improved since methotrexate dose escalation, at 22.5mg weekly for the past 4 weeks. I recommend continuing methotrexate 22.5mg weekly. No additional prednisone is necessary. Acetaminophen 500mg may be used for residual joint pain. Naproxen 440 mg prn Bid. While on prednisone:  --q 3 mo AST/ALT, Albumin, CBC with plts  --Limit EtOH intake to 2 drinks weekly; use folate 1 mg daily.  --Tylenol 500 mg tid prn nausea/HA associated with dosing.    #Left shoulder pain:  Previous symptoms of movement associated left shoulder pain are greatly reduced. I think that physical therapy and home exercise program have been instrumental in reducing pain associated with left impingement syndrome. I recommend continuing home exercise program once or twice daily as directed by physical therapy.    Orders:  - folic acid (FOLVITE) 1 MG tablet  Dispense: 90 tablet; Refill: 3  - methotrexate 2.5 MG tablet  Dispense: 96 tablet; Refill: 2  - Creatinine  - ALT  - AST  - CBC with platelets        Follow-up: Return in about 4 months (around 10/28/2019).     HPI:   Zayra Ramirez is a 49 year old female with a history of low back pain who presents for follow up. I last evaluated her on 5/15/19 at which time she reported persistent left shoulder and bilateral hand pain. I recommended she increase methotrexate dose to 15mg weekly and 4 weeks and plan to increment the dose for 5mg  monthly for a maximum of 25mg weekly.     Today, she reports that her joint pain has improved overall. She still has some pain in bilateral 3rd fingers. She also reports persistent left shoulder pain in the setting of impingement syndrome and is continuing with home therapy per the instruction of her physical therapist. Besides mild abdominal pain, she has no other concerns. Denies mouth or nose sores. Gilma nausea or vomiting. Continued on methotrexate as of 4 weeks ago. She is continued on naproxen twice daily.       Review of Systems:   Pertinent items are noted in HPI or as below, remainder of complete ROS is negative.      No recent problems with hearing or vision. No swallowing problems.   No breathing difficulty, shortness of breath, coughing, or wheezing  No chest pain or palpitations  No heart burn, indigestion, nausea, vomiting, diarrhea  No urination problems, no bloody, cloudy urine, no dysuria  No numbing, tingling, weakness  No headaches or confusion  No rashes. No easy bleeding or bruising.       Active Medications:   Current Outpatient Medications:      albuterol (PROAIR HFA, PROVENTIL HFA, VENTOLIN HFA) 108 (90 BASE) MCG/ACT inhaler, Inhale 2 puffs into the lungs every 6 hours as needed for shortness of breath / dyspnea or wheezing, Disp: , Rfl:      ARIPiprazole (ABILIFY PO), Take 10 mg by mouth daily., Disp: , Rfl:      cholecalciferol ( ULTRA STRENGTH) 2000 units CAPS, TAKE ONE CAPSULE BY MOUTH DAILY IN AM, Disp: , Rfl:      cyclobenzaprine (FLEXERIL) 10 MG tablet, Take 10 mg by mouth, Disp: , Rfl:      DULOXETINE HCL PO, Take 120 mg by mouth daily, Disp: , Rfl:      fluticasone-salmeterol (ADVAIR) 500-50 MCG/DOSE diskus inhaler, Inhale 1 puff into the lungs every 12 hours, Disp: , Rfl:      folic acid (FOLVITE) 1 MG tablet, Take 1 tablet (1 mg) by mouth daily, Disp: 90 tablet, Rfl: 3     lisinopril-hydrochlorothiazide (PRINZIDE/ZESTORETIC) 20-25 MG per tablet, Take 1 tablet by mouth daily,  Disp: 30 tablet, Rfl: 0     methotrexate 2.5 MG tablet, Take 9 tabs once weekly, Disp: 96 tablet, Rfl: 2     montelukast (SINGULAIR) 10 MG tablet, Take 10 mg by mouth At Bedtime, Disp: , Rfl:      NAPROXEN PO, Take 500 mg by mouth 2 times daily as needed for moderate pain, Disp: , Rfl:      OXYGEN-HELIUM IN, 2-3L PRN during the day, 3L @ night, Disp: , Rfl:      predniSONE (DELTASONE) 5 MG tablet, Take 3 tabs daily for 10 days, then 2 tabs daily for 10 days, then off., Disp: 50 tablet, Rfl: 1     predniSONE (DELTASONE) 5 MG tablet, TAKE 1 TABLET BY MOUTH EVERY DAY WITH FOOD, Disp: , Rfl: 1     ROPINIROLE HCL PO, Take 2 mg by mouth nightly as needed , Disp: , Rfl:      triamcinolone acetonide (KENALOG) 10 MG/ML injection, Inject 20mg today, Disp: 20 mL, Rfl: 0     ferrous sulfate (FEROSUL) 325 (65 Fe) MG tablet, Take 325 mg by mouth daily, Disp: , Rfl: 0     HYDROcodone-acetaminophen (NORCO) 5-325 MG per tablet, Take 1-2 tablets by mouth every 4 hours as needed (Moderate to Severe Pain) (Patient not taking: Reported on 2/20/2019), Disp: 7 tablet, Rfl: 0  No current facility-administered medications for this visit.     Facility-Administered Medications Ordered in Other Visits:      Lidocaine 1 % injection 0.5-5 mL, 0.5-5 mL, Other, Once PRN, Gutierrez Candelario MD     sodium chloride (PF) 0.9% PF flush 5-50 mL, 5-50 mL, Intracatheter, Once PRN, Gutierrez Candelario MD      Allergies:   Bee venom; Bees; Doxycycline; Erythromycin; and Hydrocodone-acetaminophen      Past Medical History:  Remarkable for moderate, persistent asthma, hypertension, bipolar II disorder, interstitial lung disease, cervical stenosis with myelopathy 2017.      Chronic midline low back pain  Facial cellulitis  Morbid (severe) obesity due to excess calories  Dental abscess  Hypoxia  Subcutaneous emphysema  Borderline personality disorder  Stenosis of cervical spine with myelopathy  Esophageal stricture  Gastroesophageal reflux disease   Hypertension    Interstitial lung disease  Moderate persistent asthma without complication  Onychomycosis  Restless leg syndrome  Seasonal allergies  Smoker  Stress  Respiratory bronchiolitis interstitial lung disease     Past Surgical History:  Remarkable for surgical fusion  Hysterectomy 1997  rectocele and Cystocele surgery  Cholecystectomy     Family History:   Sister: Diabetes, thyroid disease  Mother: Rheumatoid arthritis  Father: Diabetes, alcoholism  No family history of psoriasis       Social History:   Former smoker (1 pack/day)     Physical Exam:   /83   Pulse 94   Wt 113.4 kg (250 lb)   LMP  (LMP Unknown)   SpO2 97%   BMI 44.29 kg/m     Wt Readings from Last 4 Encounters:   06/28/19 113.4 kg (250 lb)   05/15/19 107.5 kg (237 lb)   04/23/19 106.1 kg (233 lb 14.4 oz)   02/20/19 106.4 kg (234 lb 8 oz)     Constitutional: Well-developed, appearing stated age; cooperative  Eyes: Normal EOM, PERRLA, vision, conjunctiva, sclera  ENT: Normal external ears, nose, hearing, lips, teeth, gums, throat. No mucous membrane lesions, normal saliva pool. 1/2cm of depressed white area near molar.   Neck: No mass or thyroid enlargement  Resp: Lungs clear to auscultation, nl to palpation  CV: RRR, no murmurs, rubs or gallops, no edema  GI: No ABD mass or tenderness, no HSM  : Not tested  Lymph: No cervical, supraclavicular, inguinal or epitrochlear nodes  MS: The TMJ, neck, shoulder, elbow, wrist, spine, hip, knee, ankle, and foot MTP/IP joints were examined and found normal. No active synovitis or altered joint anatomy. Full joint ROM. Normal  strength. No dactylitis,  tenosynovitis, enthesopathy. Fist formation is near complete bilaterally. Mild swelling of the proximal aspect of the right 3rd digit. Tenderness at right 3rd PIP. Other MCPs and PIPs on the right are normal. Left 3rd PIP is slightly tender. Thumb MP on the left is tender.   Skin: No nail pitting, alopecia, rash, nodules or lesions  Neuro: Normal cranial  nerves, strength, sensation, DTRs.   Psych: Normal judgement, orientation, memory, affect.     Laboratory:   RHEUM RESULTS Latest Ref Rng & Units 2/20/2019 4/23/2019 6/28/2019   SED RATE 0 - 20 mm/h 10 - -   CRP, INFLAMMATION 0.0 - 8.0 mg/L 12.4(H) 15.6(H) -   AST 0 - 45 U/L 12 14 35   ALT 0 - 50 U/L 15 20 58(H)   ALBUMIN 3.4 - 5.0 g/dL - - -   WBC 4.0 - 11.0 10e9/L 8.8 9.9 10.2   RBC 3.8 - 5.2 10e12/L 4.50 4.71 4.27   HGB 11.7 - 15.7 g/dL 14.0 15.0 13.6   HCT 35.0 - 47.0 % 42.7 45.6 42.0   MCV 78 - 100 fl 95 97 98   MCHC 31.5 - 36.5 g/dL 32.8 32.9 32.4   RDW 10.0 - 15.0 % 14.6 14.0 14.9    - 450 10e9/L 252 299 306   CREATININE 0.52 - 1.04 mg/dL - 0.81 0.76   GFR ESTIMATE, IF BLACK >60 mL/min/[1.73:m2] - >90 >90   GFR ESTIMATE >60 mL/min/[1.73:m2] - 86 >90                  Scribe Disclosure:  Ulices RICO, am serving as a scribe to document services personally performed by Panchito Saenz MD at this visit, based upon the provider's statements to me. All documentation has been reviewed by the aforementioned provider prior to being entered into the official medical record.     Ulices RICO, a scribe, prepared the chart for today's encounter.

## 2019-06-28 NOTE — PATIENT INSTRUCTIONS
Dx;  Inflammatory arthritis, improved    Plan:   1. Continue methotrexate 9 tabs weekly  2. Bloodwork every 3 months to monitor for liver irritation  3. Folate 1 mg daily to prevent methotrexate mouth sores.

## 2019-08-05 ASSESSMENT — MIFFLIN-ST. JEOR: SCORE: 1677.76

## 2019-08-08 ENCOUNTER — SURGERY - HEALTHEAST (OUTPATIENT)
Dept: SURGERY | Facility: AMBULATORY SURGERY CENTER | Age: 49
End: 2019-08-08

## 2019-08-13 ENCOUNTER — TRANSFERRED RECORDS (OUTPATIENT)
Dept: HEALTH INFORMATION MANAGEMENT | Facility: CLINIC | Age: 49
End: 2019-08-13

## 2019-08-16 ENCOUNTER — TRANSFERRED RECORDS (OUTPATIENT)
Dept: HEALTH INFORMATION MANAGEMENT | Facility: CLINIC | Age: 49
End: 2019-08-16

## 2019-08-18 ENCOUNTER — HOSPITAL ENCOUNTER (INPATIENT)
Facility: CLINIC | Age: 49
LOS: 3 days | Discharge: HOME | DRG: 029 | End: 2019-08-22
Attending: EMERGENCY MEDICINE | Admitting: NEUROLOGICAL SURGERY
Payer: COMMERCIAL

## 2019-08-18 ENCOUNTER — APPOINTMENT (OUTPATIENT)
Dept: MRI IMAGING | Facility: CLINIC | Age: 49
DRG: 029 | End: 2019-08-18
Attending: EMERGENCY MEDICINE
Payer: COMMERCIAL

## 2019-08-18 DIAGNOSIS — G06.1 SPINAL EPIDURAL ABSCESS: Primary | ICD-10-CM

## 2019-08-18 DIAGNOSIS — G06.2 EPIDURAL ABSCESS: ICD-10-CM

## 2019-08-18 LAB
ANION GAP SERPL CALCULATED.3IONS-SCNC: 5 MMOL/L (ref 3–14)
BASOPHILS # BLD AUTO: 0.1 10E9/L (ref 0–0.2)
BASOPHILS NFR BLD AUTO: 0.3 %
BUN SERPL-MCNC: 10 MG/DL (ref 7–30)
CALCIUM SERPL-MCNC: 9.1 MG/DL (ref 8.5–10.1)
CHLORIDE SERPL-SCNC: 101 MMOL/L (ref 94–109)
CO2 SERPL-SCNC: 31 MMOL/L (ref 20–32)
CREAT SERPL-MCNC: 0.69 MG/DL (ref 0.52–1.04)
DIFFERENTIAL METHOD BLD: ABNORMAL
EOSINOPHIL # BLD AUTO: 0.1 10E9/L (ref 0–0.7)
EOSINOPHIL NFR BLD AUTO: 0.5 %
ERYTHROCYTE [DISTWIDTH] IN BLOOD BY AUTOMATED COUNT: 13.2 % (ref 10–15)
GFR SERPL CREATININE-BSD FRML MDRD: >90 ML/MIN/{1.73_M2}
GLUCOSE SERPL-MCNC: 104 MG/DL (ref 70–99)
HCT VFR BLD AUTO: 38.9 % (ref 35–47)
HGB BLD-MCNC: 12.7 G/DL (ref 11.7–15.7)
IMM GRANULOCYTES # BLD: 0.1 10E9/L (ref 0–0.4)
IMM GRANULOCYTES NFR BLD: 0.5 %
LYMPHOCYTES # BLD AUTO: 1.5 10E9/L (ref 0.8–5.3)
LYMPHOCYTES NFR BLD AUTO: 10.6 %
MCH RBC QN AUTO: 31.8 PG (ref 26.5–33)
MCHC RBC AUTO-ENTMCNC: 32.6 G/DL (ref 31.5–36.5)
MCV RBC AUTO: 97 FL (ref 78–100)
MONOCYTES # BLD AUTO: 1 10E9/L (ref 0–1.3)
MONOCYTES NFR BLD AUTO: 6.6 %
NEUTROPHILS # BLD AUTO: 11.6 10E9/L (ref 1.6–8.3)
NEUTROPHILS NFR BLD AUTO: 81.5 %
NRBC # BLD AUTO: 0 10*3/UL
NRBC BLD AUTO-RTO: 0 /100
PLATELET # BLD AUTO: 363 10E9/L (ref 150–450)
POTASSIUM SERPL-SCNC: 3.5 MMOL/L (ref 3.4–5.3)
RBC # BLD AUTO: 4 10E12/L (ref 3.8–5.2)
SODIUM SERPL-SCNC: 137 MMOL/L (ref 133–144)
WBC # BLD AUTO: 14.3 10E9/L (ref 4–11)

## 2019-08-18 PROCEDURE — 72158 MRI LUMBAR SPINE W/O & W/DYE: CPT

## 2019-08-18 PROCEDURE — 99285 EMERGENCY DEPT VISIT HI MDM: CPT | Mod: 25

## 2019-08-18 PROCEDURE — 25500064 ZZH RX 255 OP 636: Performed by: EMERGENCY MEDICINE

## 2019-08-18 PROCEDURE — 85610 PROTHROMBIN TIME: CPT | Performed by: EMERGENCY MEDICINE

## 2019-08-18 PROCEDURE — 80048 BASIC METABOLIC PNL TOTAL CA: CPT | Performed by: EMERGENCY MEDICINE

## 2019-08-18 PROCEDURE — 25000132 ZZH RX MED GY IP 250 OP 250 PS 637: Performed by: EMERGENCY MEDICINE

## 2019-08-18 PROCEDURE — 86140 C-REACTIVE PROTEIN: CPT | Performed by: EMERGENCY MEDICINE

## 2019-08-18 PROCEDURE — 85025 COMPLETE CBC W/AUTO DIFF WBC: CPT | Performed by: EMERGENCY MEDICINE

## 2019-08-18 PROCEDURE — A9585 GADOBUTROL INJECTION: HCPCS | Performed by: EMERGENCY MEDICINE

## 2019-08-18 PROCEDURE — 85652 RBC SED RATE AUTOMATED: CPT | Performed by: EMERGENCY MEDICINE

## 2019-08-18 PROCEDURE — 99285 EMERGENCY DEPT VISIT HI MDM: CPT | Mod: Z6 | Performed by: EMERGENCY MEDICINE

## 2019-08-18 PROCEDURE — 85384 FIBRINOGEN ACTIVITY: CPT | Performed by: EMERGENCY MEDICINE

## 2019-08-18 RX ORDER — OXYCODONE AND ACETAMINOPHEN 5; 325 MG/1; MG/1
2 TABLET ORAL ONCE
Status: COMPLETED | OUTPATIENT
Start: 2019-08-18 | End: 2019-08-18

## 2019-08-18 RX ORDER — GADOBUTROL 604.72 MG/ML
10 INJECTION INTRAVENOUS ONCE
Status: COMPLETED | OUTPATIENT
Start: 2019-08-18 | End: 2019-08-18

## 2019-08-18 RX ORDER — DIAZEPAM 5 MG
10 TABLET ORAL ONCE
Status: COMPLETED | OUTPATIENT
Start: 2019-08-18 | End: 2019-08-18

## 2019-08-18 RX ADMIN — DIAZEPAM 10 MG: 5 TABLET ORAL at 22:12

## 2019-08-18 RX ADMIN — OXYCODONE HYDROCHLORIDE AND ACETAMINOPHEN 2 TABLET: 5; 325 TABLET ORAL at 21:02

## 2019-08-18 RX ADMIN — GADOBUTROL 10 ML: 604.72 INJECTION INTRAVENOUS at 23:40

## 2019-08-18 ASSESSMENT — MIFFLIN-ST. JEOR: SCORE: 1733.12

## 2019-08-18 ASSESSMENT — ENCOUNTER SYMPTOMS
FEVER: 0
NAUSEA: 0
SHORTNESS OF BREATH: 0
ABDOMINAL PAIN: 0
BACK PAIN: 1
VOMITING: 0
WEAKNESS: 0
NUMBNESS: 0

## 2019-08-18 NOTE — ED TRIAGE NOTES
"Triage Assessment & Note:    BP (!) 156/97   Pulse 103   Temp 99.4  F (37.4  C) (Oral)   Resp 12   Ht 1.6 m (5' 3\")   Wt 113.4 kg (250 lb)   LMP  (LMP Unknown)   SpO2 96%   BMI 44.29 kg/m        Patient presents with: Pt with hx of back surgery 8/9/19 comes to triage with increase back pain. No reports of fever, cough, SOB, or CP    Home Treatments/Remedies: Home medications    Febrile / Afebrile: afebrile    Duration of C/o: ongoing issues, getting worse since 8/13/19    Patito Gallardo RN  August 18, 2019        "

## 2019-08-19 ENCOUNTER — ANESTHESIA EVENT (OUTPATIENT)
Dept: SURGERY | Facility: CLINIC | Age: 49
DRG: 029 | End: 2019-08-19
Payer: COMMERCIAL

## 2019-08-19 ENCOUNTER — APPOINTMENT (OUTPATIENT)
Dept: PHYSICAL THERAPY | Facility: CLINIC | Age: 49
DRG: 029 | End: 2019-08-19
Payer: COMMERCIAL

## 2019-08-19 ENCOUNTER — ANESTHESIA (OUTPATIENT)
Dept: SURGERY | Facility: CLINIC | Age: 49
DRG: 029 | End: 2019-08-19
Payer: COMMERCIAL

## 2019-08-19 ENCOUNTER — APPOINTMENT (OUTPATIENT)
Dept: GENERAL RADIOLOGY | Facility: CLINIC | Age: 49
DRG: 029 | End: 2019-08-19
Attending: NEUROLOGICAL SURGERY
Payer: COMMERCIAL

## 2019-08-19 PROBLEM — G06.1 SPINAL EPIDURAL ABSCESS: Status: ACTIVE | Noted: 2019-08-19

## 2019-08-19 LAB
ALBUMIN UR-MCNC: 30 MG/DL
APPEARANCE UR: ABNORMAL
APTT PPP: 37 SEC (ref 22–37)
BILIRUB UR QL STRIP: NEGATIVE
COLOR UR AUTO: YELLOW
CRP SERPL-MCNC: 240 MG/L (ref 0–8)
ERYTHROCYTE [SEDIMENTATION RATE] IN BLOOD BY WESTERGREN METHOD: 53 MM/H (ref 0–20)
FIBRINOGEN PPP-MCNC: 842 MG/DL (ref 200–420)
GLUCOSE UR STRIP-MCNC: NEGATIVE MG/DL
GRAM STN SPEC: ABNORMAL
GRAM STN SPEC: NORMAL
GRAM STN SPEC: NORMAL
HGB UR QL STRIP: NEGATIVE
INR PPP: 1.09 (ref 0.86–1.14)
KETONES UR STRIP-MCNC: NEGATIVE MG/DL
LEUKOCYTE ESTERASE UR QL STRIP: ABNORMAL
MUCOUS THREADS #/AREA URNS LPF: PRESENT /LPF
NITRATE UR QL: NEGATIVE
PH UR STRIP: 6 PH (ref 5–7)
RADIOLOGIST FLAGS: ABNORMAL
RBC #/AREA URNS AUTO: 0 /HPF (ref 0–2)
SOURCE: ABNORMAL
SP GR UR STRIP: 1.03 (ref 1–1.03)
SPECIMEN SOURCE: ABNORMAL
SPECIMEN SOURCE: ABNORMAL
SPECIMEN SOURCE: NORMAL
SQUAMOUS #/AREA URNS AUTO: 9 /HPF (ref 0–1)
UROBILINOGEN UR STRIP-MCNC: 8 MG/DL (ref 0–2)
WBC #/AREA URNS AUTO: 7 /HPF (ref 0–5)

## 2019-08-19 PROCEDURE — 00NX0ZZ RELEASE THORACIC SPINAL CORD, OPEN APPROACH: ICD-10-PCS | Performed by: NEUROLOGICAL SURGERY

## 2019-08-19 PROCEDURE — 97161 PT EVAL LOW COMPLEX 20 MIN: CPT | Mod: GP | Performed by: REHABILITATION PRACTITIONER

## 2019-08-19 PROCEDURE — 25000128 H RX IP 250 OP 636: Performed by: EMERGENCY MEDICINE

## 2019-08-19 PROCEDURE — 85730 THROMBOPLASTIN TIME PARTIAL: CPT | Performed by: STUDENT IN AN ORGANIZED HEALTH CARE EDUCATION/TRAINING PROGRAM

## 2019-08-19 PROCEDURE — 87205 SMEAR GRAM STAIN: CPT | Performed by: NEUROLOGICAL SURGERY

## 2019-08-19 PROCEDURE — 25000125 ZZHC RX 250: Performed by: NURSE ANESTHETIST, CERTIFIED REGISTERED

## 2019-08-19 PROCEDURE — 27210794 ZZH OR GENERAL SUPPLY STERILE: Performed by: NEUROLOGICAL SURGERY

## 2019-08-19 PROCEDURE — 87102 FUNGUS ISOLATION CULTURE: CPT | Performed by: NEUROLOGICAL SURGERY

## 2019-08-19 PROCEDURE — 25000125 ZZHC RX 250: Performed by: NEUROLOGICAL SURGERY

## 2019-08-19 PROCEDURE — 36415 COLL VENOUS BLD VENIPUNCTURE: CPT | Performed by: NURSE PRACTITIONER

## 2019-08-19 PROCEDURE — 25800030 ZZH RX IP 258 OP 636: Performed by: NURSE ANESTHETIST, CERTIFIED REGISTERED

## 2019-08-19 PROCEDURE — 36000064 ZZH SURGERY LEVEL 4 EA 15 ADDTL MIN - UMMC: Performed by: NEUROLOGICAL SURGERY

## 2019-08-19 PROCEDURE — 25000132 ZZH RX MED GY IP 250 OP 250 PS 637: Performed by: NURSE PRACTITIONER

## 2019-08-19 PROCEDURE — 85384 FIBRINOGEN ACTIVITY: CPT | Performed by: STUDENT IN AN ORGANIZED HEALTH CARE EDUCATION/TRAINING PROGRAM

## 2019-08-19 PROCEDURE — 00NY0ZZ RELEASE LUMBAR SPINAL CORD, OPEN APPROACH: ICD-10-PCS | Performed by: NEUROLOGICAL SURGERY

## 2019-08-19 PROCEDURE — 87040 BLOOD CULTURE FOR BACTERIA: CPT | Performed by: NURSE PRACTITIONER

## 2019-08-19 PROCEDURE — 87075 CULTR BACTERIA EXCEPT BLOOD: CPT | Performed by: NEUROLOGICAL SURGERY

## 2019-08-19 PROCEDURE — 37000009 ZZH ANESTHESIA TECHNICAL FEE, EACH ADDTL 15 MIN: Performed by: NEUROLOGICAL SURGERY

## 2019-08-19 PROCEDURE — 36000066 ZZH SURGERY LEVEL 4 W FLUORO 1ST 30 MIN - UMMC: Performed by: NEUROLOGICAL SURGERY

## 2019-08-19 PROCEDURE — 96376 TX/PRO/DX INJ SAME DRUG ADON: CPT

## 2019-08-19 PROCEDURE — 87070 CULTURE OTHR SPECIMN AEROBIC: CPT | Performed by: NEUROLOGICAL SURGERY

## 2019-08-19 PROCEDURE — 87176 TISSUE HOMOGENIZATION CULTR: CPT | Performed by: NEUROLOGICAL SURGERY

## 2019-08-19 PROCEDURE — 97530 THERAPEUTIC ACTIVITIES: CPT | Mod: GP | Performed by: REHABILITATION PRACTITIONER

## 2019-08-19 PROCEDURE — 81001 URINALYSIS AUTO W/SCOPE: CPT | Performed by: EMERGENCY MEDICINE

## 2019-08-19 PROCEDURE — 25800030 ZZH RX IP 258 OP 636: Performed by: EMERGENCY MEDICINE

## 2019-08-19 PROCEDURE — 25000132 ZZH RX MED GY IP 250 OP 250 PS 637: Performed by: STUDENT IN AN ORGANIZED HEALTH CARE EDUCATION/TRAINING PROGRAM

## 2019-08-19 PROCEDURE — 37000008 ZZH ANESTHESIA TECHNICAL FEE, 1ST 30 MIN: Performed by: NEUROLOGICAL SURGERY

## 2019-08-19 PROCEDURE — 12000001 ZZH R&B MED SURG/OB UMMC

## 2019-08-19 PROCEDURE — 71000014 ZZH RECOVERY PHASE 1 LEVEL 2 FIRST HR: Performed by: NEUROLOGICAL SURGERY

## 2019-08-19 PROCEDURE — 25800030 ZZH RX IP 258 OP 636: Performed by: STUDENT IN AN ORGANIZED HEALTH CARE EDUCATION/TRAINING PROGRAM

## 2019-08-19 PROCEDURE — 96374 THER/PROPH/DIAG INJ IV PUSH: CPT | Mod: 59

## 2019-08-19 PROCEDURE — 25000128 H RX IP 250 OP 636: Performed by: NEUROLOGICAL SURGERY

## 2019-08-19 PROCEDURE — 40000277 XR SURGERY CARM FLUORO LESS THAN 5 MIN W STILLS: Mod: TC

## 2019-08-19 PROCEDURE — 25000128 H RX IP 250 OP 636: Performed by: NURSE ANESTHETIST, CERTIFIED REGISTERED

## 2019-08-19 PROCEDURE — 87077 CULTURE AEROBIC IDENTIFY: CPT | Performed by: NEUROLOGICAL SURGERY

## 2019-08-19 PROCEDURE — 25000565 ZZH ISOFLURANE, EA 15 MIN: Performed by: NEUROLOGICAL SURGERY

## 2019-08-19 PROCEDURE — 25000128 H RX IP 250 OP 636: Performed by: NURSE PRACTITIONER

## 2019-08-19 PROCEDURE — 87186 SC STD MICRODIL/AGAR DIL: CPT | Performed by: NEUROLOGICAL SURGERY

## 2019-08-19 RX ORDER — POTASSIUM CL/LIDO/0.9 % NACL 10MEQ/0.1L
10 INTRAVENOUS SOLUTION, PIGGYBACK (ML) INTRAVENOUS
Status: DISCONTINUED | OUTPATIENT
Start: 2019-08-19 | End: 2019-08-22 | Stop reason: HOSPADM

## 2019-08-19 RX ORDER — ONDANSETRON 4 MG/1
4 TABLET, ORALLY DISINTEGRATING ORAL EVERY 6 HOURS PRN
Status: DISCONTINUED | OUTPATIENT
Start: 2019-08-19 | End: 2019-08-22 | Stop reason: HOSPADM

## 2019-08-19 RX ORDER — ARIPIPRAZOLE 5 MG/1
10 TABLET ORAL DAILY
Status: DISCONTINUED | OUTPATIENT
Start: 2019-08-19 | End: 2019-08-19

## 2019-08-19 RX ORDER — CYCLOBENZAPRINE HCL 10 MG
10 TABLET ORAL EVERY 8 HOURS PRN
Status: DISCONTINUED | OUTPATIENT
Start: 2019-08-19 | End: 2019-08-19

## 2019-08-19 RX ORDER — NALOXONE HYDROCHLORIDE 0.4 MG/ML
.1-.4 INJECTION, SOLUTION INTRAMUSCULAR; INTRAVENOUS; SUBCUTANEOUS
Status: DISCONTINUED | OUTPATIENT
Start: 2019-08-19 | End: 2019-08-22 | Stop reason: HOSPADM

## 2019-08-19 RX ORDER — AMOXICILLIN 250 MG
1 CAPSULE ORAL 2 TIMES DAILY
Status: DISCONTINUED | OUTPATIENT
Start: 2019-08-19 | End: 2019-08-22 | Stop reason: HOSPADM

## 2019-08-19 RX ORDER — ACETAMINOPHEN 325 MG/1
975 TABLET ORAL EVERY 8 HOURS
Status: COMPLETED | OUTPATIENT
Start: 2019-08-19 | End: 2019-08-22

## 2019-08-19 RX ORDER — ONDANSETRON 4 MG/1
4 TABLET, ORALLY DISINTEGRATING ORAL EVERY 30 MIN PRN
Status: DISCONTINUED | OUTPATIENT
Start: 2019-08-19 | End: 2019-08-19 | Stop reason: HOSPADM

## 2019-08-19 RX ORDER — ONDANSETRON 2 MG/ML
4 INJECTION INTRAMUSCULAR; INTRAVENOUS EVERY 6 HOURS PRN
Status: DISCONTINUED | OUTPATIENT
Start: 2019-08-19 | End: 2019-08-22 | Stop reason: HOSPADM

## 2019-08-19 RX ORDER — CYCLOBENZAPRINE HCL 10 MG
10 TABLET ORAL 3 TIMES DAILY
Status: DISCONTINUED | OUTPATIENT
Start: 2019-08-19 | End: 2019-08-22 | Stop reason: HOSPADM

## 2019-08-19 RX ORDER — ASCORBIC ACID 500 MG
500 TABLET ORAL 2 TIMES DAILY
Status: DISCONTINUED | OUTPATIENT
Start: 2019-08-19 | End: 2019-08-22 | Stop reason: HOSPADM

## 2019-08-19 RX ORDER — POTASSIUM CHLORIDE 750 MG/1
20-40 TABLET, EXTENDED RELEASE ORAL
Status: DISCONTINUED | OUTPATIENT
Start: 2019-08-19 | End: 2019-08-22 | Stop reason: HOSPADM

## 2019-08-19 RX ORDER — ONDANSETRON 2 MG/ML
INJECTION INTRAMUSCULAR; INTRAVENOUS PRN
Status: DISCONTINUED | OUTPATIENT
Start: 2019-08-19 | End: 2019-08-19

## 2019-08-19 RX ORDER — ROPINIROLE 2 MG/1
2 TABLET, FILM COATED ORAL
Status: DISCONTINUED | OUTPATIENT
Start: 2019-08-19 | End: 2019-08-22 | Stop reason: HOSPADM

## 2019-08-19 RX ORDER — SODIUM CHLORIDE, SODIUM LACTATE, POTASSIUM CHLORIDE, CALCIUM CHLORIDE 600; 310; 30; 20 MG/100ML; MG/100ML; MG/100ML; MG/100ML
INJECTION, SOLUTION INTRAVENOUS CONTINUOUS
Status: DISCONTINUED | OUTPATIENT
Start: 2019-08-19 | End: 2019-08-19 | Stop reason: HOSPADM

## 2019-08-19 RX ORDER — SODIUM CHLORIDE, SODIUM LACTATE, POTASSIUM CHLORIDE, CALCIUM CHLORIDE 600; 310; 30; 20 MG/100ML; MG/100ML; MG/100ML; MG/100ML
INJECTION, SOLUTION INTRAVENOUS CONTINUOUS PRN
Status: DISCONTINUED | OUTPATIENT
Start: 2019-08-19 | End: 2019-08-19

## 2019-08-19 RX ORDER — SODIUM CHLORIDE 9 MG/ML
INJECTION, SOLUTION INTRAVENOUS CONTINUOUS
Status: DISCONTINUED | OUTPATIENT
Start: 2019-08-19 | End: 2019-08-22 | Stop reason: HOSPADM

## 2019-08-19 RX ORDER — ARIPIPRAZOLE 5 MG/1
15 TABLET ORAL DAILY
Status: DISCONTINUED | OUTPATIENT
Start: 2019-08-19 | End: 2019-08-22 | Stop reason: HOSPADM

## 2019-08-19 RX ORDER — OXYCODONE HYDROCHLORIDE 5 MG/1
5-10 TABLET ORAL
Status: DISCONTINUED | OUTPATIENT
Start: 2019-08-19 | End: 2019-08-22 | Stop reason: HOSPADM

## 2019-08-19 RX ORDER — ONDANSETRON 2 MG/ML
4 INJECTION INTRAMUSCULAR; INTRAVENOUS EVERY 30 MIN PRN
Status: DISCONTINUED | OUTPATIENT
Start: 2019-08-19 | End: 2019-08-19 | Stop reason: HOSPADM

## 2019-08-19 RX ORDER — MORPHINE SULFATE 4 MG/ML
4 INJECTION, SOLUTION INTRAMUSCULAR; INTRAVENOUS ONCE
Status: COMPLETED | OUTPATIENT
Start: 2019-08-19 | End: 2019-08-19

## 2019-08-19 RX ORDER — PREDNISONE 5 MG/1
5 TABLET ORAL
Status: DISCONTINUED | OUTPATIENT
Start: 2019-08-19 | End: 2019-08-19

## 2019-08-19 RX ORDER — FENTANYL CITRATE 50 UG/ML
25-50 INJECTION, SOLUTION INTRAMUSCULAR; INTRAVENOUS
Status: DISCONTINUED | OUTPATIENT
Start: 2019-08-19 | End: 2019-08-19 | Stop reason: HOSPADM

## 2019-08-19 RX ORDER — LIDOCAINE HYDROCHLORIDE 20 MG/ML
INJECTION, SOLUTION INFILTRATION; PERINEURAL PRN
Status: DISCONTINUED | OUTPATIENT
Start: 2019-08-19 | End: 2019-08-19

## 2019-08-19 RX ORDER — FENTANYL CITRATE 50 UG/ML
INJECTION, SOLUTION INTRAMUSCULAR; INTRAVENOUS PRN
Status: DISCONTINUED | OUTPATIENT
Start: 2019-08-19 | End: 2019-08-19

## 2019-08-19 RX ORDER — LABETALOL HYDROCHLORIDE 5 MG/ML
INJECTION, SOLUTION INTRAVENOUS PRN
Status: DISCONTINUED | OUTPATIENT
Start: 2019-08-19 | End: 2019-08-19

## 2019-08-19 RX ORDER — ALBUTEROL SULFATE 90 UG/1
2 AEROSOL, METERED RESPIRATORY (INHALATION) EVERY 6 HOURS PRN
Status: DISCONTINUED | OUTPATIENT
Start: 2019-08-19 | End: 2019-08-22 | Stop reason: HOSPADM

## 2019-08-19 RX ORDER — ZINC SULFATE 50(220)MG
220 CAPSULE ORAL DAILY
Status: DISCONTINUED | OUTPATIENT
Start: 2019-08-19 | End: 2019-08-22 | Stop reason: HOSPADM

## 2019-08-19 RX ORDER — POTASSIUM CHLORIDE 7.45 MG/ML
10 INJECTION INTRAVENOUS
Status: DISCONTINUED | OUTPATIENT
Start: 2019-08-19 | End: 2019-08-22 | Stop reason: HOSPADM

## 2019-08-19 RX ORDER — METHYLPREDNISOLONE 4 MG
TABLET, DOSE PACK ORAL
Status: ON HOLD | COMMUNITY
End: 2019-08-22

## 2019-08-19 RX ORDER — AMOXICILLIN 250 MG
2 CAPSULE ORAL 2 TIMES DAILY
Status: DISCONTINUED | OUTPATIENT
Start: 2019-08-19 | End: 2019-08-22 | Stop reason: HOSPADM

## 2019-08-19 RX ORDER — HYDRALAZINE HYDROCHLORIDE 20 MG/ML
10-20 INJECTION INTRAMUSCULAR; INTRAVENOUS EVERY 30 MIN PRN
Status: DISCONTINUED | OUTPATIENT
Start: 2019-08-19 | End: 2019-08-22 | Stop reason: HOSPADM

## 2019-08-19 RX ORDER — ACETAMINOPHEN 325 MG/1
650 TABLET ORAL EVERY 4 HOURS PRN
Status: DISCONTINUED | OUTPATIENT
Start: 2019-08-22 | End: 2019-08-22 | Stop reason: HOSPADM

## 2019-08-19 RX ORDER — HYDROMORPHONE HYDROCHLORIDE 1 MG/ML
.3-.5 INJECTION, SOLUTION INTRAMUSCULAR; INTRAVENOUS; SUBCUTANEOUS EVERY 5 MIN PRN
Status: DISCONTINUED | OUTPATIENT
Start: 2019-08-19 | End: 2019-08-19 | Stop reason: HOSPADM

## 2019-08-19 RX ORDER — DULOXETIN HYDROCHLORIDE 60 MG/1
120 CAPSULE, DELAYED RELEASE ORAL DAILY
Status: DISCONTINUED | OUTPATIENT
Start: 2019-08-19 | End: 2019-08-22 | Stop reason: HOSPADM

## 2019-08-19 RX ORDER — POTASSIUM CHLORIDE 1.5 G/1.58G
20-40 POWDER, FOR SOLUTION ORAL
Status: DISCONTINUED | OUTPATIENT
Start: 2019-08-19 | End: 2019-08-22 | Stop reason: HOSPADM

## 2019-08-19 RX ORDER — PROCHLORPERAZINE MALEATE 10 MG
10 TABLET ORAL EVERY 6 HOURS PRN
Status: DISCONTINUED | OUTPATIENT
Start: 2019-08-19 | End: 2019-08-22 | Stop reason: HOSPADM

## 2019-08-19 RX ORDER — LABETALOL 20 MG/4 ML (5 MG/ML) INTRAVENOUS SYRINGE
10-40 EVERY 10 MIN PRN
Status: DISCONTINUED | OUTPATIENT
Start: 2019-08-19 | End: 2019-08-22 | Stop reason: HOSPADM

## 2019-08-19 RX ORDER — SODIUM CHLORIDE 9 MG/ML
INJECTION, SOLUTION INTRAVENOUS CONTINUOUS
Status: ACTIVE | OUTPATIENT
Start: 2019-08-19 | End: 2019-08-19

## 2019-08-19 RX ORDER — MAGNESIUM SULFATE HEPTAHYDRATE 40 MG/ML
4 INJECTION, SOLUTION INTRAVENOUS EVERY 4 HOURS PRN
Status: DISCONTINUED | OUTPATIENT
Start: 2019-08-19 | End: 2019-08-22 | Stop reason: HOSPADM

## 2019-08-19 RX ORDER — HYDROMORPHONE HCL/0.9% NACL/PF 0.2MG/0.2
0.2 SYRINGE (ML) INTRAVENOUS
Status: DISPENSED | OUTPATIENT
Start: 2019-08-19 | End: 2019-08-19

## 2019-08-19 RX ORDER — OXYCODONE AND ACETAMINOPHEN 5; 325 MG/1; MG/1
1 TABLET ORAL EVERY 8 HOURS PRN
Status: ON HOLD | COMMUNITY
End: 2019-08-22

## 2019-08-19 RX ORDER — METOCLOPRAMIDE 10 MG/1
10 TABLET ORAL EVERY 6 HOURS PRN
Status: DISCONTINUED | OUTPATIENT
Start: 2019-08-19 | End: 2019-08-22 | Stop reason: HOSPADM

## 2019-08-19 RX ORDER — LIDOCAINE 40 MG/G
CREAM TOPICAL
Status: DISCONTINUED | OUTPATIENT
Start: 2019-08-19 | End: 2019-08-22 | Stop reason: HOSPADM

## 2019-08-19 RX ORDER — MORPHINE SULFATE 2 MG/ML
2 INJECTION, SOLUTION INTRAMUSCULAR; INTRAVENOUS
Status: DISCONTINUED | OUTPATIENT
Start: 2019-08-19 | End: 2019-08-22 | Stop reason: HOSPADM

## 2019-08-19 RX ORDER — DEXAMETHASONE SODIUM PHOSPHATE 4 MG/ML
INJECTION, SOLUTION INTRA-ARTICULAR; INTRALESIONAL; INTRAMUSCULAR; INTRAVENOUS; SOFT TISSUE PRN
Status: DISCONTINUED | OUTPATIENT
Start: 2019-08-19 | End: 2019-08-19

## 2019-08-19 RX ORDER — PROCHLORPERAZINE 25 MG
25 SUPPOSITORY, RECTAL RECTAL EVERY 12 HOURS PRN
Status: DISCONTINUED | OUTPATIENT
Start: 2019-08-19 | End: 2019-08-22 | Stop reason: HOSPADM

## 2019-08-19 RX ORDER — NALOXONE HYDROCHLORIDE 0.4 MG/ML
.1-.4 INJECTION, SOLUTION INTRAMUSCULAR; INTRAVENOUS; SUBCUTANEOUS
Status: DISCONTINUED | OUTPATIENT
Start: 2019-08-19 | End: 2019-08-19

## 2019-08-19 RX ORDER — METOCLOPRAMIDE HYDROCHLORIDE 5 MG/ML
10 INJECTION INTRAMUSCULAR; INTRAVENOUS EVERY 6 HOURS PRN
Status: DISCONTINUED | OUTPATIENT
Start: 2019-08-19 | End: 2019-08-22 | Stop reason: HOSPADM

## 2019-08-19 RX ORDER — MONTELUKAST SODIUM 10 MG/1
10 TABLET ORAL AT BEDTIME
Status: DISCONTINUED | OUTPATIENT
Start: 2019-08-19 | End: 2019-08-22 | Stop reason: HOSPADM

## 2019-08-19 RX ORDER — POTASSIUM CHLORIDE 29.8 MG/ML
20 INJECTION INTRAVENOUS
Status: DISCONTINUED | OUTPATIENT
Start: 2019-08-19 | End: 2019-08-22 | Stop reason: HOSPADM

## 2019-08-19 RX ORDER — PROPOFOL 10 MG/ML
INJECTION, EMULSION INTRAVENOUS PRN
Status: DISCONTINUED | OUTPATIENT
Start: 2019-08-19 | End: 2019-08-19

## 2019-08-19 RX ORDER — LISINOPRIL AND HYDROCHLOROTHIAZIDE 20; 25 MG/1; MG/1
1 TABLET ORAL DAILY
Status: DISCONTINUED | OUTPATIENT
Start: 2019-08-19 | End: 2019-08-22 | Stop reason: HOSPADM

## 2019-08-19 RX ADMIN — VANCOMYCIN HYDROCHLORIDE 2000 MG: 10 INJECTION, POWDER, LYOPHILIZED, FOR SOLUTION INTRAVENOUS at 06:07

## 2019-08-19 RX ADMIN — MORPHINE SULFATE 2 MG: 2 INJECTION, SOLUTION INTRAMUSCULAR; INTRAVENOUS at 03:45

## 2019-08-19 RX ADMIN — MORPHINE SULFATE 4 MG: 4 INJECTION INTRAVENOUS at 01:09

## 2019-08-19 RX ADMIN — FENTANYL CITRATE 50 MCG: 50 INJECTION, SOLUTION INTRAMUSCULAR; INTRAVENOUS at 05:02

## 2019-08-19 RX ADMIN — OXYCODONE HYDROCHLORIDE 10 MG: 5 TABLET ORAL at 11:40

## 2019-08-19 RX ADMIN — SUGAMMADEX 400 MG: 100 INJECTION, SOLUTION INTRAVENOUS at 07:00

## 2019-08-19 RX ADMIN — MORPHINE SULFATE 2 MG: 2 INJECTION, SOLUTION INTRAMUSCULAR; INTRAVENOUS at 02:20

## 2019-08-19 RX ADMIN — Medication 100 MG: at 04:14

## 2019-08-19 RX ADMIN — OXYCODONE HYDROCHLORIDE 10 MG: 5 TABLET ORAL at 14:34

## 2019-08-19 RX ADMIN — ARIPIPRAZOLE 15 MG: 5 TABLET ORAL at 13:43

## 2019-08-19 RX ADMIN — SODIUM CHLORIDE: 9 INJECTION, SOLUTION INTRAVENOUS at 09:30

## 2019-08-19 RX ADMIN — Medication 20000 UNITS: at 20:24

## 2019-08-19 RX ADMIN — CEFEPIME HYDROCHLORIDE 2 G: 2 INJECTION, POWDER, FOR SOLUTION INTRAVENOUS at 06:10

## 2019-08-19 RX ADMIN — DEXAMETHASONE SODIUM PHOSPHATE 8 MG: 4 INJECTION, SOLUTION INTRA-ARTICULAR; INTRALESIONAL; INTRAMUSCULAR; INTRAVENOUS; SOFT TISSUE at 06:25

## 2019-08-19 RX ADMIN — FENTANYL CITRATE 100 MCG: 50 INJECTION, SOLUTION INTRAMUSCULAR; INTRAVENOUS at 04:14

## 2019-08-19 RX ADMIN — ROCURONIUM BROMIDE 30 MG: 10 INJECTION INTRAVENOUS at 06:26

## 2019-08-19 RX ADMIN — MELATONIN 1000 UNITS: at 20:24

## 2019-08-19 RX ADMIN — FLUTICASONE FUROATE AND VILANTEROL TRIFENATATE 1 PUFF: 200; 25 POWDER RESPIRATORY (INHALATION) at 13:44

## 2019-08-19 RX ADMIN — FENTANYL CITRATE 100 MCG: 50 INJECTION, SOLUTION INTRAMUSCULAR; INTRAVENOUS at 05:00

## 2019-08-19 RX ADMIN — LISINOPRIL AND HYDROCHLOROTHIAZIDE 1 TABLET: 25; 20 TABLET ORAL at 13:44

## 2019-08-19 RX ADMIN — ZINC SULFATE CAP 220 MG (50 MG ELEMENTAL ZN) 220 MG: 220 (50 ZN) CAP at 20:24

## 2019-08-19 RX ADMIN — FENTANYL CITRATE 50 MCG: 50 INJECTION, SOLUTION INTRAMUSCULAR; INTRAVENOUS at 06:53

## 2019-08-19 RX ADMIN — VANCOMYCIN HYDROCHLORIDE 2000 MG: 10 INJECTION, POWDER, LYOPHILIZED, FOR SOLUTION INTRAVENOUS at 17:08

## 2019-08-19 RX ADMIN — DULOXETINE HYDROCHLORIDE 120 MG: 60 CAPSULE, DELAYED RELEASE ORAL at 13:43

## 2019-08-19 RX ADMIN — PROPOFOL 200 MG: 10 INJECTION, EMULSION INTRAVENOUS at 04:14

## 2019-08-19 RX ADMIN — ONDANSETRON 4 MG: 2 INJECTION INTRAMUSCULAR; INTRAVENOUS at 06:32

## 2019-08-19 RX ADMIN — LABETALOL HYDROCHLORIDE 5 MG: 5 INJECTION INTRAVENOUS at 06:54

## 2019-08-19 RX ADMIN — ACETAMINOPHEN 975 MG: 325 TABLET, FILM COATED ORAL at 11:40

## 2019-08-19 RX ADMIN — CEFEPIME HYDROCHLORIDE 2 G: 2 INJECTION, POWDER, FOR SOLUTION INTRAVENOUS at 21:18

## 2019-08-19 RX ADMIN — ROCURONIUM BROMIDE 50 MG: 10 INJECTION INTRAVENOUS at 04:21

## 2019-08-19 RX ADMIN — OXYCODONE HYDROCHLORIDE 10 MG: 5 TABLET ORAL at 17:59

## 2019-08-19 RX ADMIN — HYDROMORPHONE HYDROCHLORIDE 1 MG: 1 INJECTION, SOLUTION INTRAMUSCULAR; INTRAVENOUS; SUBCUTANEOUS at 06:30

## 2019-08-19 RX ADMIN — MIDAZOLAM 2 MG: 1 INJECTION INTRAMUSCULAR; INTRAVENOUS at 04:14

## 2019-08-19 RX ADMIN — CYCLOBENZAPRINE HYDROCHLORIDE 10 MG: 10 TABLET, FILM COATED ORAL at 20:24

## 2019-08-19 RX ADMIN — Medication 0.2 MG: at 09:30

## 2019-08-19 RX ADMIN — ACETAMINOPHEN 975 MG: 325 TABLET, FILM COATED ORAL at 17:08

## 2019-08-19 RX ADMIN — FENTANYL CITRATE 50 MCG: 50 INJECTION, SOLUTION INTRAMUSCULAR; INTRAVENOUS at 05:39

## 2019-08-19 RX ADMIN — MONTELUKAST 10 MG: 10 TABLET, FILM COATED ORAL at 21:18

## 2019-08-19 RX ADMIN — CEFEPIME HYDROCHLORIDE 2 G: 2 INJECTION, POWDER, FOR SOLUTION INTRAVENOUS at 13:45

## 2019-08-19 RX ADMIN — OXYCODONE HYDROCHLORIDE 10 MG: 5 TABLET ORAL at 21:18

## 2019-08-19 RX ADMIN — SENNOSIDES AND DOCUSATE SODIUM 2 TABLET: 8.6; 5 TABLET ORAL at 20:24

## 2019-08-19 RX ADMIN — LIDOCAINE HYDROCHLORIDE 100 MG: 20 INJECTION, SOLUTION INFILTRATION; PERINEURAL at 04:14

## 2019-08-19 RX ADMIN — CYCLOBENZAPRINE HYDROCHLORIDE 10 MG: 10 TABLET, FILM COATED ORAL at 13:44

## 2019-08-19 RX ADMIN — CYCLOBENZAPRINE HYDROCHLORIDE 10 MG: 10 TABLET, FILM COATED ORAL at 11:40

## 2019-08-19 RX ADMIN — SODIUM CHLORIDE, POTASSIUM CHLORIDE, SODIUM LACTATE AND CALCIUM CHLORIDE: 600; 310; 30; 20 INJECTION, SOLUTION INTRAVENOUS at 04:08

## 2019-08-19 ASSESSMENT — ACTIVITIES OF DAILY LIVING (ADL)
ADLS_ACUITY_SCORE: 13
ADLS_ACUITY_SCORE: 15
ADLS_ACUITY_SCORE: 15

## 2019-08-19 ASSESSMENT — LIFESTYLE VARIABLES: TOBACCO_USE: 1

## 2019-08-19 NOTE — PROGRESS NOTES
08/19/19 1537   Quick Adds   Type of Visit Initial PT Evaluation   Living Environment   Lives With significant other   Living Arrangements house   Home Accessibility stairs to enter home;stairs within home   Number of Stairs, Main Entrance 2   Stair Railings, Main Entrance none   Number of Stairs, Within Home, Primary other (see comments)  (down to basement for laundry room)   Transportation Anticipated car, drives self   Living Environment Comment Pt reports she lives in a house with significant other, also has un-related son that she took in. Pt has sunk in living room with 1 step down, has stairs to basement for laundry, but does not need to access can have assist from family.    Self-Care   Usual Activity Tolerance good   Current Activity Tolerance fair   Regular Exercise Other (see comments)  (active with job and gardening)   Equipment Currently Used at Home other (see comments);walker, rolling;cane, straight  (home oxygen, CPAP)   Activity/Exercise/Self-Care Comment Pt reports she does not use AD at baseline, but has a WW and SPC at home available for use, also reports she has a stool in the shower.    Functional Level Prior   Ambulation 0-->independent   Transferring 0-->independent   Toileting 0-->independent   Bathing 0-->independent   Communication 0-->understands/communicates without difficulty   Swallowing 0-->swallows foods/liquids without difficulty   Cognition 0 - no cognition issues reported   Fall history within last six months yes   Number of times patient has fallen within last six months 1   Which of the above functional risks had a recent onset or change? ambulation;transferring;toileting;bathing;dressing   General Information   Onset of Illness/Injury or Date of Surgery - Date 08/18/19   Referring Physician Bernardo Almazan MD   Pertinent History of Current Problem (include personal factors and/or comorbidities that impact the POC) Pt is a 49-year-old woman who recently underwent trial  of epidural spinal cord stimulator for lifelong low back pain.  She presents to the ED today with change in nature of her pain to include pain at the spinal cord stimulator insertion site and intermittent shooting pains to her right groin.  MRI performed in the ED demonstrated concern for spinal epidural abscess at the T12-L1 level causing compression of the conus medullaris. Pt is s/p T11-L1 laminectomy for decompression of epidural abscess on 8/19/19.   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    Cognitive Status Examination   Orientation orientation to person, place and time   Level of Consciousness alert   Follows Commands and Answers Questions 100% of the time   Personal Safety and Judgment intact   Memory intact   Pain Assessment   Patient Currently in Pain Yes, see Vital Sign flowsheet  (Pt reports incisional pain)   Integumentary/Edema   Integumentary/Edema other (describe)   Integumentary/Edema Comments Pt with hemovac drain x 2, incision in posterior spine.    Posture    Posture Forward head position   Range of Motion (ROM)   ROM Quick Adds No deficits were identified   Strength   Strength Comments BLE 5/5 throughout   Bed Mobility   Bed Mobility Comments Pt tx supine->sit with SBA, HOB up 60 degrees.    Transfer Skills   Transfer Comments Pt tx sit->stand with CGA.    Balance   Balance other (describe)   Balance Comments IND seated balance, minimal sway in static standing   Sensory Examination   Sensory Perception no deficits were identified   Clinical Impression   Criteria for Skilled Therapeutic Intervention yes, treatment indicated   PT Diagnosis Impaired Functional Mobility   Influenced by the following impairments spinal precautions, incisional pain limiting mobility, activity intolerance, pain limiting mobility    Functional limitations due to impairments bed mob, transfers, gait, balance   Clinical Presentation Evolving/Changing   Clinical Presentation Rationale PMHx, current presentation, clinical judgement   Clinical Decision Making (Complexity) Low complexity   Therapy Frequency Daily   Predicted Duration of Therapy Intervention (days/wks) 8/26/19   Anticipated Discharge Disposition Home with Assist;Home with Home Therapy   Risk & Benefits of therapy have been explained Yes   Patient, Family & other staff in agreement with plan of care Yes   Total Evaluation Time   Total Evaluation Time (Minutes) 10

## 2019-08-19 NOTE — ANESTHESIA PREPROCEDURE EVALUATION
Anesthesia Evaluation     . Pt has had prior anesthetic. Type: General    No history of anesthetic complications          ROS/MED HX    ENT/Pulmonary: Comment: Mouth ulcers, interstitial lung disease on home oxygen. 3 L at night and 1-3 L as needed throughout the day with activity    (+)sleep apnea, tobacco use, asthma Treatment: Inhaler prn and Inhaler daily,  uses CPAP , . .    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 : . . . :. .       METS/Exercise Tolerance:     Hematologic:  - neg hematologic  ROS       Musculoskeletal:  - neg musculoskeletal ROS       GI/Hepatic:     (+) GERD hiatal hernia (esophageal stricture),       Renal/Genitourinary:  - ROS Renal section negative       Endo:     (+) Obesity, .      Psychiatric:     (+) psychiatric history other (comment) (borderline personality)      Infectious Disease:  - neg infectious disease ROS       Malignancy:      - no malignancy   Other:    (+) H/O Chronic Pain,                   Physical Exam  Normal systems: cardiovascular and dental    Airway   Mallampati: III  TM distance: <3 FB  Neck ROM: full    Dental     Cardiovascular   Rhythm and rate: regular and normal      Pulmonary (+) wheezes                       Anesthesia Plan      History & Physical Review  History and physical reviewed and following examination; no interval change.    ASA Status:  3 emergent.        Plan for General          Postoperative Care      Consents                          .    CHRISTIANO FV AN PHYSICAL EXAM    Assessment:   ASA SCORE: 3 emergent   H&P: History and physical reviewed and following examination; no interval change.   Smoking Status:  Active Smoker        Plan:   Anes. Type:  General   Pre-Medication: None   Induction:  IV (RSI)   Airway: ETT; Oral   Access/Monitoring: PIV   Maintenance: Balanced     Postop Plan:   Postop Pain: Opioids  Postop Sedation/Airway: Not  planned  Disposition: Inpatient/Admit     PONV Management:   Adult Risk Factors: Female, Postop Opioids   Prevention: Ondansetron, Dexamethasone     CONSENT: Direct conversation   Plan and risks discussed with: Patient; Father   Blood Products: Consented (ALL Blood Products)

## 2019-08-19 NOTE — OP NOTE
Procedure Date: 08/19/2019      STAFF SURGEON:  Yadiel Beal MD      RESIDENT SURGEON:  Jack Leschke, MD      PREOPERATIVE DIAGNOSIS:  Thoracolumbar epidural abscess.      POSTOPERATIVE DIAGNOSIS:  Thoracolumbar epidural abscess.      PROCEDURE PERFORMED:  T11-L1 laminectomy for decompression of epidural abscess.      FINDINGS:  Gross purulence, started on broad-spectrum antibiotics, cultures pending     ANESTHESIA:  General endotracheal.      ESTIMATED BLOOD LOSS:  200 mL.      INDICATION FOR THE OPERATION:  The patient is a 49-year-old who has a history of hypertension, obesity, obstructive sleep apnea, and chronic back pain.  She tried a spinal cord stimulator that was implanted on 08/08/2019 and removed on 08/13/2019 due to failure to improve pain symptoms.  This was done at [facility].  Unfortunately, she presented with progressive low back pain and radiating pain into her lower extremities.  This was also accompanied by bladder disturbance and at least 2 episodes of incontinence.  An MRI was obtained given her point tenderness and pain localized over the upper lumbar region, and it showed an enhancing lesion approximately 1.5 cm thick strongly suggestive of an epidural abscess.  There was significant and severe compression of the thecal sac.  Given the severity of the radiographic findings and her clinical progression, the decision was made to proceed emergently to the OR, given the possibility of irreversible clinical decline.  Written consent was obtained for the operation after careful discussion of the risks and benefits.      DESCRIPTION OF PROCEDURE:  The patient was brought to the operating room where general endotracheal anesthesia was induced.  The patient was positioned prone on a Laci head frame.  Her arms were extended gently.  The thoracolumbar spine was prepped and draped in standard fashion.  Perioperative antibiotics were held, but later administered (vancomycin and cefepime) after cultures  had been obtained.  9 mL of bupivacaine with epinephrine was infiltrated in the planned incision.      After conducting appropriate timeout, a #10 blade was used to make a midline incision after localization had occurred at approximately T12.  Bovie cautery was used to dissect down into the midline and then the subperiosteal dissection over the spinous processes and lamina at T12 were carried out.  Another fluoroscopic image confirmed our level.  The T11-L1 levels were exposed across the lamina bilaterally.  Once there was adequate exposure, the Leksell instruments were used to remove the spinous processes and then the high-speed drill was used to create an outline at the lateral aspect of the lamina bilaterally. Further removal in a piecemeal fashion with the Leksell rongeur was carried out.  Ultimately, the #3 and #4 Kerrison instruments were used to remove bone and ligament that were overlying the affected areas.  Before all of the bone was removed, there was overt purulence that was seen both at the cranial and caudal aspects of the incision.  Several cultures were obtained in both the syringes and with the swabs.  We were careful to remove ligament and aspirate all of the visible purulent material.  Bone and soft tissue were removed methodically around the periphery of the exposure.  Granulomatous-appearing reactive tissue was adherent to the dura, and we did not put excessive force toward removing these elements.  The dura was well decompressed.  We spent extra time making sure that the bone did not have any sharp edges that might compress and injure the dura once it expands.  We then copiously irrigated.  Antibiotics had been started.  Two Hemovac drains were placed both above and below the incision.      Muscle and fascia were closed tightly with 0 Vicryl in a simple interrupted fashion.  Subcutaneous tissue was closed using 2-0 Vicryls in an inverted, interrupted fashion followed by staples at the skin  surface.  The wound was cleaned with wet and dry sponges.  A sterile dressing was placed over the incision thereafter.  The drapes were taken down.  The patient was turned back to the hospital bed.  She was extubated prior to being transferred to the postanesthesia care unit.  Instrument, needle and sponge counts were correct at the end of the operation.     I was present and performed the key portion of the procedure.        HIEU LINDSEY MD       As dictated by JOHN M. LESCHKE, MD            D: 2019   T: 2019   MT: JESUS      Name:     JAYA MATHEW   MRN:      [id number]        Account:        [id number]   :      1970           Procedure Date: 2019      Document: [id number]

## 2019-08-19 NOTE — ED NOTES
[facility], [place]   ED Nurse to Floor Handoff     Zayra Ramirez is a 49 year old female who speaks English and lives with a spouse,  in a home  They arrived in the ED by car from home    ED Chief Complaint: Back Pain    ED Dx;   Final diagnoses:   None         Needed?: No    Allergies:   Allergies   Allergen Reactions     Bee Venom Anaphylaxis     Bees      Doxycycline Anaphylaxis     Erythromycin Anaphylaxis and Shortness Of Breath     Other reaction(s): Vomiting     Hydrocodone-Acetaminophen Itching   .  Past Medical Hx:   Past Medical History:   Diagnosis Date     Allergic rhinitis      Anemia      Arthritis      Asthma     copd     Dental abscess 8-2015     Depressive disorder      Gastroesophageal reflux disease      History of emphysema      Hoarseness      Hypertension      Morbid obesity with BMI of 40.0-44.9, adult (H)      Obstructive sleep apnea      Respiratory bronchiolitis interstitial lung disease (H)      Sleep apnea       Baseline Mental status: WDL  Current Mental Status changes: at basesline    Infection present or suspected this encounter: no  Sepsis suspected: No  Isolation type: No active isolations     Activity level - Baseline/Home:  Independent  Activity Level - Current:   Stand with Assist    Bariatric equipment needed?: No    In the ED these meds were given:   Medications   diazepam (VALIUM) tablet 10 mg (10 mg Oral Given 8/18/19 2212)   oxyCODONE-acetaminophen (PERCOCET) 5-325 MG per tablet 2 tablet (2 tablets Oral Given 8/18/19 2102)   gadobutrol (GADAVIST) injection 10 mL (10 mLs Intravenous Given 8/18/19 2340)       Drips running?  No    Home pump  No    Current LDAs  Peripheral IV 08/18/19 Right Upper forearm (Active)   Site Assessment WDL 8/18/2019  9:30 PM   Line Status Saline locked 8/18/2019  9:30 PM   Phlebitis Scale 0-->no symptoms 8/18/2019  9:30 PM   Infiltration Scale 0 8/18/2019  9:30 PM   Infiltration Site Treatment Method  None  8/18/2019  9:30 PM   Extravasation? No 8/18/2019  9:30 PM   Dressing Intervention New dressing  8/18/2019  9:30 PM   Number of days: 1       Wound 06/08/16 Left Chest Ulceration (Active)   Number of days: 1167       Wound 06/08/16 Mid Coccyx (Active)   Number of days: 1167       Wound 06/08/16 Left Heel Abrasion(s) (Active)   Number of days: 1167       Incision/Surgical Site 10/03/18 Left Mouth (Active)   Number of days: 320       Labs results:   Labs Ordered and Resulted from Time of ED Arrival Up to the Time of Departure from the ED   CBC WITH PLATELETS DIFFERENTIAL - Abnormal; Notable for the following components:       Result Value    WBC 14.3 (*)     Absolute Neutrophil 11.6 (*)     All other components within normal limits   BASIC METABOLIC PANEL - Abnormal; Notable for the following components:    Glucose 104 (*)     All other components within normal limits   ROUTINE UA WITH MICROSCOPIC REFLEX TO CULTURE       Imaging Studies:   Recent Results (from the past 24 hour(s))   Lumbar spine MRI w & w/o contrast - surgery <10yrs   Result Value    Radiologist flags See impression (Urgent)    Impression    Impression:   1. Findings highly concerning for epidural abscess within the previous  neurostimulator tract from approximately T11-L1. This produces  moderate to severe spinal canal stenosis at the level of T12-L1  compressing the conus medullaris, although there is no definite  myelopathic signal. There is extension this fluid collection into the  left T12-L1 neural foramen. Recommend neurosurgery consultation.  2. There is enhancement along the insertion tract at approximately  L3-4 with probable reactive myositis in the right paraspinous  musculature at L2-3.  3. Multilevel lumbar spondylosis most significant at L2-3 with severe  right neuroforaminal narrowing and moderate to severe spinal canal  stenosis with redundancy of the superior nerve roots.      [Urgent Result: See impression]    Finding was identified  "on 8/18/2019 11:56 PM.     Dr. Short was contacted by Dr. Garibay at 8/19/2019 12:22 AM and  verbalized understanding of the urgent finding.          Recent vital signs:   BP (!) 148/77   Pulse 86   Temp 99.4  F (37.4  C) (Oral)   Resp 16   Ht 1.6 m (5' 3\")   Wt 113.4 kg (250 lb)   LMP  (LMP Unknown)   SpO2 98%   BMI 44.29 kg/m      [place] Coma Scale Score: 15 (08/19/19 0031)       Cardiac Rhythm: Normal Sinus  Pt needs tele? No  Skin/wound Issues: None    Code Status: Full Code    Pain control: good    Nausea control: pt had none    Abnormal labs/tests/findings requiring intervention: See MRI results    Family present during ED course? Yes   Family Comments/Social Situation comments: Pt's  present    Tasks needing completion: None    Brady Barajas, RN  [phone] [facility]    "

## 2019-08-19 NOTE — H&P
"[facility]       NEUROSURGERY H&P NOTE    This consultation was requested by Dr. Short from the Emergency Medicine service.    Reason for Consultation  Concern for epidural abscess    HPI:  Ms. Ramirez is a 49-year-old woman who recently underwent trial of epidural spinal cord stimulator for lifelong low back pain.  She presents to the ED today with change in nature of her pain to include pain at the spinal cord stimulator insertion site and intermittent shooting pains to her right groin.  MRI performed in the ED demonstrated concern for spinal epidural abscess at the T12-L1 level causing compression of the conus medullaris.    The patient reports that, after undergoing implantation of a trial spinal cord stimulator on 8/8/2019, her back pain substantially worsened, requiring decrease in settings.  Since removal of the stimulator on 8/13, the patient reports that her back pain has changed in character: now she feels an odd \"pulling\" sensation in her mid-back in addition to intermittent shooting pains in her right groin.    The patient denies using any blood thinning medications.  She reports that she last ate at approximately 6PM today.    No recent fevers, chills, nausea, vomiting, chest pain, shortness of breath, and denies headaches, weakness, LOC, numbness/weakness in extremities, changes in sensation, taste, smell, nor trouble speaking or other neurologic symptoms.    PAST MEDICAL HISTORY:   Past Medical History:   Diagnosis Date     Allergic rhinitis      Anemia      Arthritis      Asthma     copd     Dental abscess 8-2015     Depressive disorder      Gastroesophageal reflux disease      History of emphysema      Hoarseness      Hypertension      Morbid obesity with BMI of 40.0-44.9, adult (H)      Obstructive sleep apnea      Respiratory bronchiolitis interstitial lung disease (H)      Sleep apnea        PAST SURGICAL HISTORY:   Past Surgical History:   Procedure " Laterality Date     COLONOSCOPY       ENT SURGERY       EXCISE LESION INTRAORAL Bilateral 10/3/2018    Procedure: EXCISE LESION INTRAORAL;  Wide Local Excision Of of Left Oral Cavity Ulcer;  Surgeon: Morro Mijares MD;  Location: [facility] OR     HC DRAIN SKIN ABSCESS SIMPLE/SINGLE  3/16/2012    Procedure:INCISION AND DRAINAGE, ABSCESS, SIMPLE; Surgeon:CHRISTIANO HANCOCK; Location:[facility]     HEAD & NECK SURGERY       HYSTERECTOMY       INCISION AND DRAINAGE ABDOMEN WASHOUT, COMBINED         FAMILY HISTORY:   Family History   Problem Relation Age of Onset     Other Cancer Father         base of tongue cancer at age ~65     Hypertension Father      Asthma Mother        SOCIAL HISTORY:   Social History     Tobacco Use     Smoking status: Current Every Day Smoker     Packs/day: 1.00     Years: 30.00     Pack years: 30.00     Types: Cigarettes     Start date: 1/1/1996     Smokeless tobacco: Never Used   Substance Use Topics     Alcohol use: No       MEDICATIONS:  Current Outpatient Medications   Medication Sig Dispense Refill     albuterol (PROAIR HFA, PROVENTIL HFA, VENTOLIN HFA) 108 (90 BASE) MCG/ACT inhaler Inhale 2 puffs into the lungs every 6 hours as needed for shortness of breath / dyspnea or wheezing       ARIPiprazole (ABILIFY PO) Take 10 mg by mouth daily.       cholecalciferol ( ULTRA STRENGTH) 2000 units CAPS TAKE ONE CAPSULE BY MOUTH DAILY IN AM       cyclobenzaprine (FLEXERIL) 10 MG tablet Take 10 mg by mouth       ferrous sulfate (FEROSUL) 325 (65 Fe) MG tablet Take 325 mg by mouth daily  0     fluticasone-salmeterol (ADVAIR) 500-50 MCG/DOSE diskus inhaler Inhale 1 puff into the lungs every 12 hours       folic acid (FOLVITE) 1 MG tablet Take 1 tablet (1 mg) by mouth daily 90 tablet 3     HYDROcodone-acetaminophen (NORCO) 5-325 MG per tablet Take 1-2 tablets by mouth every 4 hours as needed (Moderate to Severe Pain) 7 tablet 0     lisinopril-hydrochlorothiazide (PRINZIDE/ZESTORETIC) 20-25 MG per  "tablet Take 1 tablet by mouth daily 30 tablet 0     methotrexate 2.5 MG tablet Take 9 tabs once weekly 96 tablet 2     montelukast (SINGULAIR) 10 MG tablet Take 10 mg by mouth At Bedtime       NAPROXEN PO Take 500 mg by mouth 2 times daily as needed for moderate pain       predniSONE (DELTASONE) 5 MG tablet Take 3 tabs daily for 10 days, then 2 tabs daily for 10 days, then off. 50 tablet 1     predniSONE (DELTASONE) 5 MG tablet TAKE 1 TABLET BY MOUTH EVERY DAY WITH FOOD  1     ROPINIROLE HCL PO Take 2 mg by mouth nightly as needed        DULOXETINE HCL PO Take 120 mg by mouth daily       OXYGEN-HELIUM IN 2-3L PRN during the day, 3L @ night       triamcinolone acetonide (KENALOG) 10 MG/ML injection Inject 20mg today 20 mL 0       Allergies:  Allergies   Allergen Reactions     Bee Venom Anaphylaxis     Bees      Doxycycline Anaphylaxis     Erythromycin Anaphylaxis and Shortness Of Breath     Other reaction(s): Vomiting     Hydrocodone-Acetaminophen Itching       ROS: 10 point ROS of systems including Constitutional, Eyes, Respiratory, Cardiovascular, Gastroenterology, Genitourinary, Integumentary, Muscularskeletal, Psychiatric were all negative except for pertinent positives noted in my HPI.    Physical exam:   Blood pressure (!) 148/77, pulse 86, temperature 99.4  F (37.4  C), temperature source Oral, resp. rate 16, height 1.6 m (5' 3\"), weight 113.4 kg (250 lb), SpO2 98 %, not currently breastfeeding.  CV: RRR on telemetry  PULM: breathing comfortably on room air  ABD: soft, non-distended  NEUROLOGIC:  -- Awake; Alert; oriented x 3  -- Follows commands briskly  -- Speech fluent, spontaneous. Mild dysarthria after receiving Percocet in ED.  -- no gaze preference. No apparent hemineglect.  Cranial Nerves:  -- visual fields full to confrontation, PERRL 3-2mm bilat and brisk, extraocular movements intact  -- face symmetrical, tongue midline  -- sensory V1-V3 intact bilaterally  -- palate elevates symmetrically, uvula " midline  -- hearing grossly intact bilat  -- Trapezii 5/5 strength bilat symmetric  -- Cerebellar: Finger nose finger without dysmetria, intact rapid alternating motions bilaterally    Motor:  Normal bulk / tone; no tremor, rigidity, or bradykinesia.  No muscle wasting or fasciculations  No Pronator Drift     Delt Bi Tri Hand Flexion/  Extension Iliopsoas Quadriceps Hamstrings Tibialis Anterior Gastroc    C5 C6 C7 C8/T1 L2 L3 L4-S1 L4 S1   R 5 5 5 5 5 5 5 5 5   L 5 5 5 5 5 5 5 5 5   Sensory:  intact to LT x 4 extremities     Reflexes:     Bi Tri BR Oleksandr Pat Ach Bab    C5-6 C7-8 C6 UMN L2-4 S1 UMN   R 2+ 2+ 2+ Norm 2+ 2+ Norm   L 2+ 2+ 2+ Norm 2+ 2+ Norm       IMAGING:  MRI lumbar spine 8/19/2019:  1. Findings highly concerning for epidural abscess within the previous  neurostimulator tract from approximately T11-L1. This produces  moderate to severe spinal canal stenosis at the level of T12-L1  compressing the conus medullaris, although there is no definite  myelopathic signal. There is extension this fluid collection into the  left T12-L1 neural foramen. Recommend neurosurgery consultation.  2. There is enhancement along the insertion tract at approximately  L3-4 with probable reactive myositis in the right paraspinous  musculature at L2-3.  3. Multilevel lumbar spondylosis most significant at L2-3 with severe  right neuroforaminal narrowing and moderate to severe spinal canal  stenosis with redundancy of the superior nerve roots.      LABS:   Lab Results   Component Value Date    WBC 14.3 08/18/2019     Lab Results   Component Value Date    RBC 4.00 08/18/2019     Lab Results   Component Value Date    HGB 12.7 08/18/2019     Lab Results   Component Value Date    HCT 38.9 08/18/2019     Lab Results   Component Value Date    MCV 97 08/18/2019     Lab Results   Component Value Date    MCH 31.8 08/18/2019     Lab Results   Component Value Date    MCHC 32.6 08/18/2019     Lab Results   Component Value Date    RDW 13.2  08/18/2019     Lab Results   Component Value Date     08/18/2019       Last Comprehensive Metabolic Panel:  Sodium   Date Value Ref Range Status   08/18/2019 137 133 - 144 mmol/L Final     Potassium   Date Value Ref Range Status   08/18/2019 3.5 3.4 - 5.3 mmol/L Final     Chloride   Date Value Ref Range Status   08/18/2019 101 94 - 109 mmol/L Final     Carbon Dioxide   Date Value Ref Range Status   08/18/2019 31 20 - 32 mmol/L Final     Anion Gap   Date Value Ref Range Status   08/18/2019 5 3 - 14 mmol/L Final     Glucose   Date Value Ref Range Status   08/18/2019 104 (H) 70 - 99 mg/dL Final     Urea Nitrogen   Date Value Ref Range Status   08/18/2019 10 7 - 30 mg/dL Final     Creatinine   Date Value Ref Range Status   08/18/2019 0.69 0.52 - 1.04 mg/dL Final     GFR Estimate   Date Value Ref Range Status   08/18/2019 >90 >60 mL/min/[1.73_m2] Final     Comment:     Non  GFR Calc  Starting 12/18/2018, serum creatinine based estimated GFR (eGFR) will be   calculated using the Chronic Kidney Disease Epidemiology Collaboration   (CKD-EPI) equation.       Calcium   Date Value Ref Range Status   08/18/2019 9.1 8.5 - 10.1 mg/dL Final       No results found for: INR   No results found for: PTT     ASSESSMENT:  49 year old-year-old female with recent spinal cord stimulator trial for lifelong low back pain and subsequent change in nature of back pain including right groin intermittent shooting pain.  MRI demonstrates concern for T12-L1 spinal epidural abscess.    RECOMMENDATIONS:  - Emergent OR for thoracic 12 laminectomy for epidural abscess evacuation  - Blood cultures  - Admission to 6A postop  - Avoid sedating medications that would alter a neurological examination    The patient was discussed with Dr. Leschke, neurosurgery chief resident, and he agrees with the above.    Bernardo Almazan M.D.  Neurosurgery Resident, PGY-2    Please contact neurosurgery resident on call with questions.    Dial * *  [phone], enter [phone] when prompted.       Neurosurgery faculty note    Patient presentation reviewed. Please see Dr. Almazan' email for details. Given the patient's severe back pain as well as an MRI demonstrating impressive T11, T12, L1 epidural abscess, I believe that emergent surgical evacuation is warranted. Will proceed with this plan of action.    I spent 45 minutes personally evaluating clinical and imaging findings & managing the care of this patient.

## 2019-08-19 NOTE — CONSULTS
GENERAL ID SERVICE: NEW CONSULTATION  Patient:  Zayra Ramirez, Date of birth 1970, Medical record number [id number]  Date of Admission: 8/18/2019  Date of Visit:  8/19/2019  Requesting Provider: Yadiel Beal         Assessment and Recommendations:   Problem List:    # Increasing back pain after spinal cord stimulator removal + episode incontinence  of urinary and MRI showing  epidural abscess within the previous neurostimulator tract from approximately T11-L1  - S/p Laminectomy with epidural abscess evacuation - Gram stain showed Grampositive cocci - cultures pending    #  Chronic back pain s/p bilateral trial spinal cord stimulator on 8/8/19 (removed 8/13)    # RA onweekly methotrexate    # ILD on chronic O2    # Asthma    Discussion:    Mrs. Zayra Ramirez  49 year old female admitted don 8/18/19. She has PMHx of  RA on weekly methotrexate, ILD on chronic O2 use, asthma, smoking history, HTN, morbid obesity, ROBERT, hysterectomy, and chronic back pain s/p bilateral trial spinal cord stimulator on 8/8/19 (removed 8/13 given worsening back pain) who presents with her father for evaluation of lower back pain. MRI lumbar spine was significant for posterior epidural abscess within the previous neurostimulator tract from approximately T11-L1. This produces moderate to severe spinal canal stenosis at the level of T12-L1 compressing the conus medullaris. She also has urinary incontinence. White count was 14.3. CRP is 240 and ESR is 53.     Neurosurgery evaluated the patient on 8/19 and proceeded with emergent OR exploration for laminectomy (at the level of T12) and epidural abscess evacuation. Blood cultures were obtained and vancomycin and cefepime were started. Per surgical report   there was overt purulence that was seen both at the cranial and caudal aspects of the incision. Intraoperative cultures were obtained. Gram stain showed presence of Gram positive cocci. Blood cultures and intra operative cultures  are pending.       I agree with broad spectrum coverage with cefepime and vancomycin at this moment. I will continue to follow blood and intra operative cultures and adjust antimicrobials accordingly, when cultures are back    Recommendations:  1. Please continue vancomycin - goal trough 15-20. Appreciate pharmacy support to adjust doses and monitor levels  2. Please continue cefepime   3. I will continue to follow blood and intra operative cultures and adjust antimicrobials accordingly when cultures are back     Thank you for this consult. The General ID team will continue to follow this patient. Please feel free to call with any questions.     Elizabeth Elizabeth MD  Date of Service: 08/19/19  Pager: [phone]       History of Present Illness:     Mrs. Zayra Ramirez is a 49 year old female admitted don 8/18/19. She has PMHx of  RA on weekly methotrexate, ILD on chronic O2 use, asthma, smoking history, HTN, morbid obesity, ROBERT, hysterectomy, and chronic back pain s/p bilateral trial spinal cord stimulator on 8/8/19 (removed 8/13 given worsening back pain) who presents with her father for evaluation of lower back pain.       Patient reports she's had new pain in her bilateral lower back since her spinal cord stimulator was removed. She states this pain feels different from her prior chronic pains. It originates from where her stimulator was placed and radiates up her back and down into her upper b/l legs. She states this pain prompted her to undergo an MRI on Friday, 8/16/19, at [facility] in [place] (results not available through Care Everywhere). Patient reports she was called with the results by her physician and told her MRI was notable for fluid build-up in her back. She was prescribed Methylprednisolone and tizanidine for this and has been taking Percocet 5 mg 3 times a day as well. With her pain, the patient has also had 2 episodes where she lost control of her bladder.  On both 8/12 and 8/17 she felt a sudden need to  use the restroom, was unable to make it in time, and wet herself. Otherwise, she denies loss of bowel control, fevers, numbness/weakness in extremities, saddle anesthesias, nausea, vomiting, abdominal/chest pain, or shortness of breath. Denies recent falls or traumas. Denies drainage or bleeding from her incision sites.     Patient presented to the ED and underwent an MRI of the lumbar spine 0n 8/18 overnight which showed suspect posterior epidural abscess within the previous neurostimulator tract from approximately T11-L1. This produces moderate to severe spinal canal stenosis at the level of T12-L1 compressing the conus medullaris, although there is no definite myelopathic signal. There is extension this fluid collection into the left T12-L1 neural foramen. It also showed enhancement along the insertion tract at approximately L3-4 with probable reactive myositis in the right paraspinous musculature at L2-3.    Neurosurgery evaluated the patient on 8/19 and proceeded with emergent OR exploration for laminectomy (at the level of T12) and epidural abscess evacuation. Blood cultures were obtained and vancomycin and cefepime were started. Per surgical report  there was overt purulence that was seen both at the cranial and caudal aspects of the incision. Intraoperative cultures were obtained. Gram stain showed presence of Gram positive cocci. Cultures are pending.     Patient is afebrile and white count is 14.3. CRP is 240 and ESR is 53. Renal function is normal.          Review of Systems:     CONSTITUTIONAL:  No fevers or chills  INTEGUMENTARY/SKIN: NEGATIVE for worrisome rashes, moles or lesions  EYES: Negative for icterus, vision changes or irritation  ENT/MOUTH:  Negative for oral lesions and sore throat  RESPIRATORY:  Negative for cough and dyspnea  CARDIOVASCULAR:  Negative for chest pain, palpitations and  shortness of breath  GASTROINTESTINAL:  Negative for abdominal pain, nausea, vomiting, diarrhea and  constipation  GENITOURINARY:  Negative for dysuria, hematuria, frequency and urgency  MUSCULOSKELETAL: Negative for joint pain, swelling, motion restriction, negative for musculoskeletal pain  NEURO:  Negative for headache, altered mental status, numbness or weakness  PSYCHIATRIC: Negative for changes in mood or affect  HEMATOLOGIC/LYMPHATIC: negative for lymphadenopathy or bleeding  ALLERGIC/IMMUNOLOGIC: Negative for allergic reaction   ENDOCRINE: Negative for temperature intolerance, skin/hair changes       Past Medical History:     Past Medical History:   Diagnosis Date     Allergic rhinitis      Anemia      Arthritis      Asthma     copd     Dental abscess 8-2015     Depressive disorder      Gastroesophageal reflux disease      History of emphysema      Hoarseness      Hypertension      Morbid obesity with BMI of 40.0-44.9, adult (H)      Obstructive sleep apnea      Respiratory bronchiolitis interstitial lung disease (H)      Sleep apnea          Allergies:      Allergies   Allergen Reactions     Bee Venom Anaphylaxis     Bees      Doxycycline Anaphylaxis     Patient thinks it may have been just nausea and vomiting, however unable to confirm     Erythromycin Anaphylaxis and Shortness Of Breath     Other reaction(s): Vomiting     Hydrocodone-Acetaminophen Itching            Family History:   Reviewed and noncontributory.   Family History   Problem Relation Age of Onset     Other Cancer Father         base of tongue cancer at age ~65     Hypertension Father      Asthma Mother             Social History:     Social History     Socioeconomic History     Marital status: Single     Spouse name: Not on file     Number of children: Not on file     Years of education: Not on file     Highest education level: Not on file   Occupational History     Not on file   Social Needs     Financial resource strain: Not on file     Food insecurity:     Worry: Not on file     Inability: Not on file     Transportation needs:      "Medical: Not on file     Non-medical: Not on file   Tobacco Use     Smoking status: Current Every Day Smoker     Packs/day: 1.00     Years: 30.00     Pack years: 30.00     Types: Cigarettes     Start date: 1/1/1996     Smokeless tobacco: Never Used   Substance and Sexual Activity     Alcohol use: No     Drug use: No     Sexual activity: Never   Lifestyle     Physical activity:     Days per week: Not on file     Minutes per session: Not on file     Stress: Not on file   Relationships     Social connections:     Talks on phone: Not on file     Gets together: Not on file     Attends Mandaen service: Not on file     Active member of club or organization: Not on file     Attends meetings of clubs or organizations: Not on file     Relationship status: Not on file     Intimate partner violence:     Fear of current or ex partner: Not on file     Emotionally abused: Not on file     Physically abused: Not on file     Forced sexual activity: Not on file   Other Topics Concern     Parent/sibling w/ CABG, MI or angioplasty before 65F 55M? Not Asked   Social History Narrative     Not on file     TOBACCO/ALCOHOL/RECREATIONAL DRUGS:   Current smoker. Smoked for 30 years - 1 pack a day. Currently trying to stop but not being very successful  Denies drug use               Physical Exam:   /67 (BP Location: Left arm)   Pulse 76   Temp 97.7  F (36.5  C) (Oral)   Resp 22   Ht 1.6 m (5' 3\")   Wt 113.4 kg (250 lb)   LMP  (LMP Unknown)   SpO2 90%   BMI 44.29 kg/m         Exam:  GENERAL:  On O2 by nasal cannula (chronic). Not in acute distress  HEAD: Normocephalic and atraumatic  ENT:  No hearing impairment, no ear pain or exudate, ear canals and TM's normal, nose and mouth without ulcers or lesions, oropharynx clear, oral mucous membranes moist, no tonsillar erythema and edema, oropharynx is without exudates or ulcers.  EYES:  Eyes grossly normal to inspection, PERRL and conjunctivae and sclerae normal   NECK:  Supple, no " adenopathy, no asymmetry, masses, or scars and thyroid normal to palpation  LUNGS:  Clear to auscultation - no rales, rhonchi or wheezes  CARDIOVASCULAR:  Regular rate and rhythm, normal S1 S2, no S3 or S4, no murmur, click or rub, no peripheral edema and peripheral pulses strong  ABDOMEN:  Soft, nontender, no hepatosplenomegaly, no masses and bowel sounds normal  EXT: Extremities warm and without edema.  NEUROLOGIC:  Drain drom the lumbar spine with sanguinous drainage  PSYCHIATRIC: Mood stable, mentation appears normal, affect normal             Laboratory Data:     Creatinine   Date Value Ref Range Status   08/18/2019 0.69 0.52 - 1.04 mg/dL Final   06/28/2019 0.76 0.52 - 1.04 mg/dL Final   04/23/2019 0.81 0.52 - 1.04 mg/dL Final   10/03/2018 0.78 0.52 - 1.04 mg/dL Final   04/04/2018 0.73 0.52 - 1.04 mg/dL Final     WBC   Date Value Ref Range Status   08/18/2019 14.3 (H) 4.0 - 11.0 10e9/L Final   06/28/2019 10.2 4.0 - 11.0 10e9/L Final   04/23/2019 9.9 4.0 - 11.0 10e9/L Final   02/20/2019 8.8 4.0 - 11.0 10e9/L Final   04/26/2018 10.9 4.0 - 11.0 10e9/L Final     Hemoglobin   Date Value Ref Range Status   08/18/2019 12.7 11.7 - 15.7 g/dL Final     Platelet Count   Date Value Ref Range Status   08/18/2019 363 150 - 450 10e9/L Final     Lab Results   Component Value Date     08/18/2019    BUN 10 08/18/2019    CO2 31 08/18/2019     CRP Inflammation   Date Value Ref Range Status   08/18/2019 240.0 (H) 0.0 - 8.0 mg/L Final   04/23/2019 15.6 (H) 0.0 - 8.0 mg/L Final   02/20/2019 12.4 (H) 0.0 - 8.0 mg/L Final   04/06/2018 12.9 (H) 0.0 - 8.0 mg/L Final   04/05/2018 16.2 (H) 0.0 - 8.0 mg/L Final           Pertinent Recent Microbiology Data:     Recent Labs   Lab 08/19/19  0615 08/19/19  0606 08/19/19  0546   CULT Culture negative after 5 hours Culture negative after 5 hours Culture negative after 5 hours  PENDING  PENDING   SDES Fluid additional epidural Spine Lumbar specimen 3  Fluid additional epidural Spine  Lumbar specimen 3 Ligament Interspinal Thoracic Spine Tissue specimen 2  Ligament Interspinal Thoracic Spine Tissue specimen 2 Fluid epidural Spine specimen 1  Fluid epidural Spine specimen 1  Fluid epidural Spine specimen 1  Fluid epidural Spine specimen 1            Imaging:     Recent Results (from the past 48 hour(s))   Lumbar spine MRI w & w/o contrast - surgery <10yrs   Result Value    Radiologist flags See impression (Urgent)    Narrative    MR LUMBAR SPINE W/O & W CONTRAST 8/18/2019 11:56 PM    Provided History: back pain, urinary incontinence, spinal stim placed  8/9, removed 8/13.    Comparison: MR lumbar spine 5/4/2016    ICD-10:    Technique: Sagittal T1-weighted and T2-weighted and axial T2-weighted  images of the lumbar spine were obtained without intravenous contrast.  Post intravenous contrast using gadolinium axial and sagittal  T1-weighted images were obtained with fat saturation.    Contrast: 10mL Gadavist    Findings: Regarding numbering convention, there are 5 lumbar-type  vertebrae assumed for the purposes of this dictation.      There is a T1 hypointense, T2 hyperintense bilobed cystic structure  within the posterior epidural space at T11-L1. This measures  approximately 0.8 x 1.1 x 6.3 cm. This appears to extend into the  nerve sheath of the left T12-L1 foramen. This lesion produces  significant compression on the conus medullaris with moderate to  severe spinal canal stenosis at T12-L1. This fluid collection displays  rim enhancement and mild restricted diffusion. There is no abnormal  signal within the conus which terminates at L1-L2. There is mild  kyphosis at T12-L1 with ankylosis at this level.    Regarding alignment, the lumbar vertebral column appears normally  aligned. There is significant disc height loss from L1 to L3 with  moderate disc height loss from L3 to S1. There are fatty endplate  changes at L2-3. Regarding bone marrow signal intensity, no suspicious  abnormality is  visualized on STIR images.    On a level by level basis:    L1-2: Disc osteophyte complex with moderate bilateral facet  hypertrophy and thickening of the ligament flavum. There is moderate  right and mild-to-moderate left neuroforaminal narrowing. Moderate to  severe spinal canal stenosis.    L2-3: Disc osteophyte complex effacing the right lateral recess.  Moderate bilateral facet hypertrophy and thickening of the ligamentum  flavum. There is severe right and moderate left neural foraminal  narrowing. Moderate to severe spinal canal stenosis with superior  redundancy of the nerve roots.    L3-4: Disc osteophyte complex with effacement of the left lateral  recess contacting the descending L4 nerve root.. Moderate to advanced  bilateral facet hypertrophy and thickening of the ligamentum flavum  and moderate to severe left and mild-to-moderate right neuroforaminal  narrowing. Moderate spinal canal stenosis.    L4-5: Disc osteophyte complex effacing the left lateral recess.  Moderate to advanced bilateral facet hypertrophy and thickening of the  ligament flavum. Moderate to severe left and moderate right  neuroforaminal narrowing. Mild to moderate spinal canal stenosis.    L5-S1: Disc osteophyte complex with moderate bilateral facet  hypertrophy. Moderate to severe bilateral neuroforaminal narrowing.  Mild spinal canal stenosis..    Contrast-enhanced images demonstrate abnormal contrast enhancement  along the previous right posterior approach stimulator tract at L3-4  as well as within the right paraspinal musculature at L2-3.      Impression    Impression:   1. Suspect posterior epidural abscess within the previous  neurostimulator tract from approximately T11-L1. This produces  moderate to severe spinal canal stenosis at the level of T12-L1  compressing the conus medullaris, although there is no definite  myelopathic signal. There is extension this fluid collection into the  left T12-L1 neural foramen. Recommend  neurosurgery consultation.  2. There is enhancement along the insertion tract at approximately  L3-4 with probable reactive myositis in the right paraspinous  musculature at L2-3.  3. Multilevel lumbar spondylosis most significant at L2-3 with severe  right neuroforaminal narrowing and moderate to severe spinal canal  stenosis with redundancy of the superior nerve roots.    [Urgent Result: See impression]    Finding was identified on 8/18/2019 11:56 PM.     Dr. Short was contacted by Dr. Garibay at 8/19/2019 12:22 AM and  verbalized understanding of the urgent finding.    I have personally reviewed the examination and initial interpretation  and I agree with the findings.    MILKA KIMBLE MD   XR Surgery SEDRICK Fluoro L/T 5 Min w Stills    Narrative    This exam was marked as non-reportable because it will not be read by a   radiologist or a [facility] non-radiologist provider.

## 2019-08-19 NOTE — BRIEF OP NOTE
[facility], [place]    Brief Operative Note    Pre-operative diagnosis: Epidural Abcess  Post-operative diagnosis same  Procedure: Procedure(s):  LAMINECTOMY, SPINE, THORACIC, 11-12 and Part of Lumbar 1, DRAINAGE OF EPIDURAL ABCESS, Epidural Drain Placement X 2  Surgeon: Surgeon(s) and Role:     * Yadiel Beal MD - Primary     * Leschke, John M, MD - Resident - Assisting  Anesthesia: General   Estimated blood loss: 200  Drains: Hemovac x  Specimens:   ID Type Source Tests Collected by Time Destination   1 : Epidural Fluid Fluid Spine, Thoracic ANAEROBIC BACTERIAL CULTURE, FLUID CULTURE AEROBIC BACTERIAL, FUNGUS CULTURE, GRAM STAIN Yadiel Beal MD 8/19/2019  5:46 AM    2 : Interspinal Ligament Tissue Spine, Thoracic ANAEROBIC BACTERIAL CULTURE, FUNGUS CULTURE, GRAM STAIN, TISSUE CULTURE AEROBIC BACTERIAL Yadiel Beal MD 8/19/2019  6:06 AM    3 : Additional Epidural  Fluid Fluid Spine, Lumbar ANAEROBIC BACTERIAL CULTURE, FLUID CULTURE AEROBIC BACTERIAL, FUNGUS CULTURE, GRAM STAIN Yadiel Beal MD 8/19/2019  6:15 AM      Findings:   gross purulence, T11-L1 laminectomy, cultures sent, started on broad spectrum antibiotics intraop.  Complications: None.  I

## 2019-08-19 NOTE — OR NURSING
DR. Polk notified of O2 sats 88-91% on 4L NC post op. Patient wears oxygen at home, she states she wears 1L in the day and 2L at night in addition to CPAP for her sleep apnea. She did not bring her CPAP machine with her to the hospital. Dr. Polk is comfortable with patient transferring to 6A on 4L NC with sats 88-91%. Will encourage patient to have her CPAP machine brought in by family members.

## 2019-08-19 NOTE — ANESTHESIA CARE TRANSFER NOTE
Patient: Zayra Ramirez    Procedure(s):  LAMINECTOMY, SPINE, THORACIC, 11-12 and Part of Lumbar 1, DRAINAGE OF EPIDURAL ABCESS, Epidural Drain Placement X 2    Diagnosis: Epidural Abcess  Diagnosis Additional Information: No value filed.    Anesthesia Type:   General     Note:  Airway :Face Mask  Patient transferred to:PACU  Comments: Anesthesia Care Transfer Note    Patient: Zayra Ramirez    Transferred to: PACU    Patient vital signs: stable    Airway: none    Monitors on, VSS, pt. Stable, Report given to PACU HORACIO Mason CRNA  8/19/2019 7:14 AM      Handoff Report: Identifed the Patient, Identified the Reponsible Provider, Reviewed the pertinent medical history, Discussed the surgical course, Reviewed Intra-OP anesthesia mangement and issues during anesthesia, Set expectations for post-procedure period and Allowed opportunity for questions and acknowledgement of understanding      Vitals: (Last set prior to Anesthesia Care Transfer)    CRNA VITALS  8/19/2019 0639 - 8/19/2019 0714      8/19/2019             Resp Rate (observed):  17                Electronically Signed By: NICKO Banks CRNA  August 19, 2019  7:14 AM

## 2019-08-19 NOTE — PLAN OF CARE
Michael Holloway notified of critical result from I.D., regarding epidural spine fluid. #1 and #3 with WBC and + gram cocci. Will continue to monitor.

## 2019-08-19 NOTE — PHARMACY
Pharmacy Vancomycin Initial Note  Date of Service 2019  Patient's  1970  49 year old, female    Indication: Epidural Abscess    Current estimated CrCl = Estimated Creatinine Clearance: 120.9 mL/min (based on SCr of 0.69 mg/dL).    Creatinine for last 3 days  2019:  9:26 PM Creatinine 0.69 mg/dL    Recent Vancomycin Level(s) for last 3 days  No results found for requested labs within last 72 hours.      Vancomycin IV Administrations (past 72 hours)      No vancomycin orders with administrations in past 72 hours.                Nephrotoxins and other renal medications (From now, onward)    Start     Dose/Rate Route Frequency Ordered Stop    19 0530  vancomycin (VANCOCIN) 2,000 mg in sodium chloride 0.9 % 500 mL intermittent infusion      2,000 mg  over 2 Hours Intravenous EVERY 12 HOURS 19 0423            Contrast Orders - past 72 hours (72h ago, onward)    Start     Dose/Rate Route Frequency Ordered Stop    19 2340  gadobutrol (GADAVIST) injection 10 mL      10 mL Intravenous ONCE 19 2339 19 2340        Plan:  1.  Start vancomycin 2000 mg IV q12h.   2.  Goal Trough Level: 15-20 mg/L   3.  Pharmacy will check trough levels as appropriate in 1-3 Days.    4. Serum creatinine levels will be ordered daily for the first week of therapy and at least twice weekly for subsequent weeks.    5. [place] method utilized to dose vancomycin therapy: Method 1    Rose Doshi, PharmD, BCPS

## 2019-08-19 NOTE — PLAN OF CARE
Discharge Planner PT  6A  Patient plan for discharge: Home with family assistance, is open to inpatient rehab if needed  Current status: Pt educated on spinal precautions. Pt tx supine->sit with SBA. Pt tx sit->stand with SBA, pt reports increased pain and fatigue and declined further.   Barriers to return to prior living situation: medical status, spinal precautions, activity intolerance, pain limiting function  Recommendations for discharge: Home with Home PT  Rationale for recommendations: Pt would benefit from additional skilled PT to address functional mobility, transfers, gait and balance. Anticipate pt will be safe to discharge home when medically ready for discharge, BLE strength 5/5 and able to stand at EOB on POD 0.        Entered by: Patricia Cameron 08/19/2019 4:31 PM

## 2019-08-19 NOTE — ED PROVIDER NOTES
"  History     Chief Complaint   Patient presents with     Back Pain     HPI  Zayra Ramirez is a 49 year old female with a history of arthritis, HTN, morbid obesity, ROBERT, hysterectomy, and chronic back pain s/p bilateral trial spinal cord stimulator on 8/8/19 (removed 8/13) who presents with her father for evaluation of lower back pain. Patient reports she's had new pain in her bilateral lower back since her spinal cord stimulator was removed. She states this pain feels different from her prior chronic pains; this pain \"just grabs [her]\" and is worse when sitting or laying still for too long. It originates from where her stimulator was placed and radiates up her back and down into her upper b/l legs. She states this pain prompted her to undergo an MRI on Friday, 8/16/19, at [facility] in [place] (results not available through Care Everywhere). Patient reports she was called with the results by her physician and told her MRI was notable for fluid build-up in her back that \"needs to dissolve\". She was prescribed Methylprednisolone and tizanidine for this and has been taking Percocet 5 mg 3 times a day as well. With her pain, the patient has also had 2 episodes where she lost control of her bladder.  On both 8/12 and 8/17 she felt a sudden need to use the restroom, was unable to make it in time, and wet herself. Otherwise, she denies loss of bowel control, fevers, numbness/weakness in extremities, saddle anesthesias, nausea, vomiting, abdominal/chest pain, or shortness of breath. Denies recent falls or traumas. Denies drainage or bleeding from her incision sites.     Past Medical History:   Diagnosis Date     Allergic rhinitis      Anemia      Arthritis      Asthma     copd     Dental abscess 8-2015     Depressive disorder      Gastroesophageal reflux disease      History of emphysema      Hoarseness      Hypertension      Morbid obesity with BMI of 40.0-44.9, adult (H)      Obstructive sleep apnea      Respiratory " bronchiolitis interstitial lung disease (H)      Sleep apnea        Past Surgical History:   Procedure Laterality Date     COLONOSCOPY       ENT SURGERY       EXCISE LESION INTRAORAL Bilateral 10/3/2018    Procedure: EXCISE LESION INTRAORAL;  Wide Local Excision Of of Left Oral Cavity Ulcer;  Surgeon: Morro Mijares MD;  Location: [facility] OR     HC DRAIN SKIN ABSCESS SIMPLE/SINGLE  3/16/2012    Procedure:INCISION AND DRAINAGE, ABSCESS, SIMPLE; Surgeon:CHRISTIANO HANCOCK; Location:[facility]     HEAD & NECK SURGERY       HYSTERECTOMY       INCISION AND DRAINAGE ABDOMEN WASHOUT, COMBINED         Family History   Problem Relation Age of Onset     Other Cancer Father         base of tongue cancer at age ~65     Hypertension Father      Asthma Mother        Social History     Tobacco Use     Smoking status: Current Every Day Smoker     Packs/day: 1.00     Years: 30.00     Pack years: 30.00     Types: Cigarettes     Start date: 1/1/1996     Smokeless tobacco: Never Used   Substance Use Topics     Alcohol use: No       Current Facility-Administered Medications   Medication     morphine (PF) injection 2 mg     Current Outpatient Medications   Medication     albuterol (PROAIR HFA, PROVENTIL HFA, VENTOLIN HFA) 108 (90 BASE) MCG/ACT inhaler     ARIPiprazole (ABILIFY PO)     cholecalciferol ( ULTRA STRENGTH) 2000 units CAPS     cyclobenzaprine (FLEXERIL) 10 MG tablet     ferrous sulfate (FEROSUL) 325 (65 Fe) MG tablet     fluticasone-salmeterol (ADVAIR) 500-50 MCG/DOSE diskus inhaler     folic acid (FOLVITE) 1 MG tablet     HYDROcodone-acetaminophen (NORCO) 5-325 MG per tablet     lisinopril-hydrochlorothiazide (PRINZIDE/ZESTORETIC) 20-25 MG per tablet     methotrexate 2.5 MG tablet     montelukast (SINGULAIR) 10 MG tablet     NAPROXEN PO     predniSONE (DELTASONE) 5 MG tablet     predniSONE (DELTASONE) 5 MG tablet     ROPINIROLE HCL PO     DULOXETINE HCL PO     OXYGEN-HELIUM IN     triamcinolone acetonide (KENALOG) 10  "MG/ML injection     Facility-Administered Medications Ordered in Other Encounters   Medication     Lidocaine 1 % injection 0.5-5 mL     sodium chloride (PF) 0.9% PF flush 5-50 mL        Allergies   Allergen Reactions     Bee Venom Anaphylaxis     Bees      Doxycycline Anaphylaxis     Erythromycin Anaphylaxis and Shortness Of Breath     Other reaction(s): Vomiting     Hydrocodone-Acetaminophen Itching     I have reviewed the Medications, Allergies, Past Medical and Surgical History, and Social History in the Epic system.    Review of Systems   Constitutional: Negative for fever.   Respiratory: Negative for shortness of breath.    Cardiovascular: Negative for chest pain.   Gastrointestinal: Negative for abdominal pain, nausea and vomiting.   Genitourinary:        Positive for loss of bladder control   Musculoskeletal: Positive for back pain.   Neurological: Negative for weakness and numbness.   All other systems reviewed and are negative.      Physical Exam   BP: (!) 156/97  Pulse: 103  Temp: 99.4  F (37.4  C)  Resp: 12  Height: 160 cm (5' 3\")  Weight: 113.4 kg (250 lb)  SpO2: 96 %      Physical Exam     GEN:  Well developed, no acute distress  HEENT:  EOMI, Mucous membranes are moist.   Cardio:  RRR, no murmur, radial pulses equal bilaterally  PULM:  Lungs clear, good air movement, no wheezes, rales   Abd:  Soft, normal bowel sounds, no focal tenderness  Musculoskeletal:  normal range of motion of the extremities, no lower extremity swelling or calf tenderness.  Back exam is significant for diffuse tenderness along the lumbar and upper thoracic back.  Includes both soft tissues and the vertebrae.  The site where the spinal cord stimulators were inserted has no incision, the wound is more of a pinprick/needle poke from what I can tell.  There is no associated erythema, no drainage.  Neuro:  Alert and oriented X3, Follows commands, normal strength and sensation in the lower extremities bilaterally, however the left " patellar reflex is hyperreflexic compared to the right.  Skin:  Warm, dry    ED Course        Procedures       8:31 PM  The patient was seen and examined by Dr. Short in Room 18.        Critical Care time:  none     Patient was given Percocet with good pain relief.  She was later given p.o. Valium because of history of claustrophobia with MRI.  MRI results were discussed with radiology.  Report is shown here.  Results for orders placed or performed during the hospital encounter of 08/18/19   Lumbar spine MRI w & w/o contrast - surgery <10yrs     Value    Radiologist flags See impression (Urgent)    Impression    Impression:   1. Findings highly concerning for epidural abscess within the previous  neurostimulator tract from approximately T11-L1. This produces  moderate to severe spinal canal stenosis at the level of T12-L1  compressing the conus medullaris, although there is no definite  myelopathic signal. There is extension this fluid collection into the  left T12-L1 neural foramen. Recommend neurosurgery consultation.  2. There is enhancement along the insertion tract at approximately  L3-4 with probable reactive myositis in the right paraspinous  musculature at L2-3.  3. Multilevel lumbar spondylosis most significant at L2-3 with severe  right neuroforaminal narrowing and moderate to severe spinal canal  stenosis with redundancy of the superior nerve roots.      [Urgent Result: See impression]    Finding was identified on 8/18/2019 11:56 PM.     Dr. Short was contacted by Dr. Garibay at 8/19/2019 12:22 AM and  verbalized understanding of the urgent finding.        Patient was discussed with neurosurgery who will do consult.   Labs are normal except as shown.    Labs Ordered and Resulted from Time of ED Arrival Up to the Time of Departure from the ED   CBC WITH PLATELETS DIFFERENTIAL - Abnormal; Notable for the following components:       Result Value    WBC 14.3 (*)     Absolute Neutrophil 11.6 (*)     All  other components within normal limits   BASIC METABOLIC PANEL - Abnormal; Notable for the following components:    Glucose 104 (*)     All other components within normal limits   ROUTINE UA WITH MICROSCOPIC REFLEX TO CULTURE   ERYTHROCYTE SEDIMENTATION RATE AUTO   CRP INFLAMMATION           Assessments & Plan (with Medical Decision Making)   Patient presents with severe back pain and urinary incontinence after epidural stimulator leads were placed and removed days later.  MRI is suggestive of an epidural abscess with mass-effect on the conus.  At this time, recommendations from neurosurgery are pending and the patient is signed out to the overnight shift.    I have reviewed the nursing notes.    I have reviewed the findings, diagnosis, plan and need for follow up with the patient.    New Prescriptions    No medications on file       Final diagnoses:   Epidural abscess   I, Shyam Bernal, am serving as a trained medical scribe to document services personally performed by Kaitlin Short MD, based on the provider's statements to me.   Kaitlin RICO MD, was physically present and have reviewed and verified the accuracy of this note documented by Shyam Bernal.    8/18/2019   [facility], [place], EMERGENCY DEPARTMENT     Kaitlin Short MD  08/19/19 0119

## 2019-08-19 NOTE — PHARMACY-ADMISSION MEDICATION HISTORY
Admission medication history interview status for the 2019 admission is complete. See Epic admission navigator for allergy information, pharmacy, prior to admission medications and immunization status.     Medication history interview sources:  Patient, [facility] in [facility] ([phone])    Changes made to PTA medication list (reason)  Added:   Oxycodone-acetaminophen 5-325 m tablet every 8 hours as needed, methylprednisolone 4 mg dose pack (6 day taper, see instructions below)  Deleted: hydrocodone-acetaminophen 5-325 mg (patient no longer using/possible adverse reaction), prednisone 5 mg once daily (patient no longer taking), prednisone taper (therapy discontinued), triamcinolone injection (therapy complete in 2018)  Changed:   Cyclobenzaprine: added frequency--> every evening      Additional medication history information (including reliability of information, actions taken by pharmacist):     Patient is a moderate historian, she was able to confirm most of her medication doses and frequencies. For medications she was unclear with, [facility] in [facility] was contact to confirm medication dosing (oxycodone-acetaminophen and prednisone).    Allergies:  Doxycycline: patient stated she may have only had nausea/vomiting with this medication, not the anaphylaxis that is documented in the chart. However, unable to confirm that the patient only experienced nausea/vomiting in the chart so allergy list was not changed.    Medications:  1) Oxycodone-acetaminophen: patient mentioned that she is taking 2 tabs at night to help with pain, which is different than the prescribed 1 tablet every 8 hours as needed.  2) Methotrexate: patient takes on   3) Methylprednisolone: Taper started on  per patient, she is currently on Day 3 . Patient had been taking a prednisone taper starting  for unknown reasons (it was shipped to her house), but after being seen at a health care facility on  she was started on  methylprednisolone and the prednisone was discontinued.      Prior to Admission medications    Medication Sig Last Dose Taking? Auth Provider   albuterol (PROAIR HFA, PROVENTIL HFA, VENTOLIN HFA) 108 (90 BASE) MCG/ACT inhaler Inhale 2 puffs into the lungs every 6 hours as needed for shortness of breath / dyspnea or wheezing Past Week at Unknown time Yes Reported, Patient   ARIPiprazole (ABILIFY) 15 MG tablet Take 15 mg by mouth daily  8/17/2019 at PM Yes Reported, Patient   cholecalciferol ( ULTRA STRENGTH) 2000 units CAPS TAKE ONE CAPSULE BY MOUTH DAILY IN AM 8/18/2019 at AM Yes Reported, Patient   cyclobenzaprine (FLEXERIL) 10 MG tablet Take 10 mg by mouth every evening  8/17/2019 at PM Yes Reported, Patient   ferrous sulfate (FEROSUL) 325 (65 Fe) MG tablet Take 325 mg by mouth daily 8/17/2019 at PM Yes Reported, Patient   fluticasone-salmeterol (ADVAIR) 500-50 MCG/DOSE diskus inhaler Inhale 1 puff into the lungs every 12 hours 8/18/2019 at AM Yes Unknown, Entered By History   folic acid (FOLVITE) 1 MG tablet Take 1 tablet (1 mg) by mouth daily 8/18/2019 at AM Yes Panchito Saenz MD   lisinopril-hydrochlorothiazide (PRINZIDE/ZESTORETIC) 20-25 MG per tablet Take 1 tablet by mouth daily 8/18/2019 at AM Yes Kobi Fisher MD   methotrexate 2.5 MG tablet Take 9 tabs once weekly 8/12/2019 at AM Yes Panchito Saenz MD   methylPREDNISolone (MEDROL DOSEPAK) 4 MG tablet therapy pack Take 6 tablets (24 mg) by mouth once daily on Day 1, then decrease daily dose by 1 tablet (4 mg) each day for the next 5 days until therapy complete. Therapy started 8/16. 8/18/2019 at AM Yes Unknown, Entered By History   montelukast (SINGULAIR) 10 MG tablet Take 10 mg by mouth At Bedtime 8/17/2019 at PM Yes Unknown, Entered By History   naproxen (NAPROSYN) 500 MG tablet Take 500 mg by mouth 2 times daily as needed for moderate pain  8/18/2019 at Unknown time Yes Reported, Patient   oxyCODONE-acetaminophen (PERCOCET) 5-325 MG  tablet Take 1 tablet by mouth every 8 hours as needed for severe pain 8/18/2019 at Unknown time Yes Unknown, Provider   rOPINIRole (REQUIP) 2 MG tablet Take 2 mg by mouth nightly as needed  8/17/2019 at PM Yes Reported, Patient   DULoxetine (CYMBALTA) 60 MG capsule Take 120 mg by mouth daily  8/17/2019 at PM  Unknown, Entered By History   OXYGEN-HELIUM IN 2-3L PRN during the day, 3L @ night Unknown at Unknown time  Reported, Patient       Medication history completed by: Prince Rogel, PharmD IV Student

## 2019-08-19 NOTE — PLAN OF CARE
OT 6A. Cancel. OT orders acknowledged and appreciated. Pt POD 0, got to the unit this AM. PT initiated this PM. OT will initiate services and completed OT evaluation tomorrow.

## 2019-08-19 NOTE — PROGRESS NOTES
"Neurosurgery Progress Note      S: Post operatively states back pain has improved, states it is much easier to deep breathe.      O:  /66 (BP Location: Left arm)   Pulse 79   Temp 97.7  F (36.5  C) (Oral)   Resp 22   Ht 1.6 m (5' 3\")   Wt 113.4 kg (250 lb)   LMP  (LMP Unknown)   SpO2 90%   BMI 44.29 kg/m    Exam:  General: Awake;  Alert, In No Acute Distress  Pulm: Breathing Comfortably on 2L nasal cannula   Mental status: Oriented x 4  Cranial Nerves: Cranial Nerves II-XII Intact Bilaterally  Strength:      Del Tr Bi WE WF Gr  R 5 5 5 5 5 5  L 5 5 5 5 5 5     HF KE KF DF PF   R 5 5 5 5 5   L 5 5 5 5 5     Pronator Drift: Absent  Sensory: Intact to Light Touch  Reflexes: No Hyperreflexia, Leon s or Clonus Present; Toes Down-Going Bilaterally  INCISION: covered     Assessment:   49 year old female s/p  T12 laminectomy and decompression for evacuation of epidural abscess    Plan:     Neuro:   Sutures out: 9/2  Drains: Surgical drains X 2 (to be kept in place until 8/26)    Cardiovascular: blood pressure goals: SBP < 160; continue home Lisinopril-HCTZ    Pulmonary: continue home Ventolin, Advair, singulair; Incentive spirometry     Gastrointestinal:  advance diet as tolerated    Renal: discontinue bond once ambulating or POD#2    Heme: transfuse to maintain hemoglobin > 8;  INR < 1.5, fibrinogen < 200, or platelets < 100k     Endocrine: No issues     Infectious: Appreciate Infectious Disease consultation; Continue Vancomycin and Cefepime until cultures finalize    PT/OT: pending     DVT prophylaxis: Sequential compression devices     Ulcer prophylaxis: none indicated     DISPO:   6A    Barriers: Surgical drains, evaluation by therapies, ambulating, BM/flatus, voiding without a Bond, tolerating PO diet, pain controlled on PO medications      NICKO Greenwood, CNP  Department of Neurosurgery  Pager: [phone]    "

## 2019-08-20 ENCOUNTER — APPOINTMENT (OUTPATIENT)
Dept: PHYSICAL THERAPY | Facility: CLINIC | Age: 49
DRG: 029 | End: 2019-08-20
Payer: COMMERCIAL

## 2019-08-20 ENCOUNTER — APPOINTMENT (OUTPATIENT)
Dept: OCCUPATIONAL THERAPY | Facility: CLINIC | Age: 49
DRG: 029 | End: 2019-08-20
Payer: COMMERCIAL

## 2019-08-20 LAB
ANION GAP SERPL CALCULATED.3IONS-SCNC: 8 MMOL/L (ref 3–14)
BUN SERPL-MCNC: 11 MG/DL (ref 7–30)
CALCIUM SERPL-MCNC: 8.6 MG/DL (ref 8.5–10.1)
CHLORIDE SERPL-SCNC: 104 MMOL/L (ref 94–109)
CO2 SERPL-SCNC: 29 MMOL/L (ref 20–32)
CREAT SERPL-MCNC: 0.56 MG/DL (ref 0.52–1.04)
CRP SERPL-MCNC: 120 MG/L (ref 0–8)
ERYTHROCYTE [DISTWIDTH] IN BLOOD BY AUTOMATED COUNT: 13.7 % (ref 10–15)
ERYTHROCYTE [SEDIMENTATION RATE] IN BLOOD BY WESTERGREN METHOD: 63 MM/H (ref 0–20)
GFR SERPL CREATININE-BSD FRML MDRD: >90 ML/MIN/{1.73_M2}
GLUCOSE SERPL-MCNC: 94 MG/DL (ref 70–99)
HCT VFR BLD AUTO: 36.2 % (ref 35–47)
HGB BLD-MCNC: 11.2 G/DL (ref 11.7–15.7)
MCH RBC QN AUTO: 31.7 PG (ref 26.5–33)
MCHC RBC AUTO-ENTMCNC: 30.9 G/DL (ref 31.5–36.5)
MCV RBC AUTO: 103 FL (ref 78–100)
PLATELET # BLD AUTO: 363 10E9/L (ref 150–450)
POTASSIUM SERPL-SCNC: 3.6 MMOL/L (ref 3.4–5.3)
RBC # BLD AUTO: 3.53 10E12/L (ref 3.8–5.2)
SODIUM SERPL-SCNC: 141 MMOL/L (ref 133–144)
VANCOMYCIN SERPL-MCNC: 18.3 MG/L
WBC # BLD AUTO: 15.3 10E9/L (ref 4–11)

## 2019-08-20 PROCEDURE — 86140 C-REACTIVE PROTEIN: CPT | Performed by: NURSE PRACTITIONER

## 2019-08-20 PROCEDURE — 25800030 ZZH RX IP 258 OP 636: Performed by: EMERGENCY MEDICINE

## 2019-08-20 PROCEDURE — 97116 GAIT TRAINING THERAPY: CPT | Mod: GP

## 2019-08-20 PROCEDURE — 85027 COMPLETE CBC AUTOMATED: CPT | Performed by: STUDENT IN AN ORGANIZED HEALTH CARE EDUCATION/TRAINING PROGRAM

## 2019-08-20 PROCEDURE — 97530 THERAPEUTIC ACTIVITIES: CPT | Mod: GP

## 2019-08-20 PROCEDURE — 97535 SELF CARE MNGMENT TRAINING: CPT | Mod: GO | Performed by: OCCUPATIONAL THERAPIST

## 2019-08-20 PROCEDURE — 25000132 ZZH RX MED GY IP 250 OP 250 PS 637: Performed by: STUDENT IN AN ORGANIZED HEALTH CARE EDUCATION/TRAINING PROGRAM

## 2019-08-20 PROCEDURE — 97165 OT EVAL LOW COMPLEX 30 MIN: CPT | Mod: GO | Performed by: OCCUPATIONAL THERAPIST

## 2019-08-20 PROCEDURE — 80048 BASIC METABOLIC PNL TOTAL CA: CPT | Performed by: STUDENT IN AN ORGANIZED HEALTH CARE EDUCATION/TRAINING PROGRAM

## 2019-08-20 PROCEDURE — 25000128 H RX IP 250 OP 636: Performed by: EMERGENCY MEDICINE

## 2019-08-20 PROCEDURE — 36415 COLL VENOUS BLD VENIPUNCTURE: CPT | Performed by: NEUROLOGICAL SURGERY

## 2019-08-20 PROCEDURE — 97110 THERAPEUTIC EXERCISES: CPT | Mod: GP

## 2019-08-20 PROCEDURE — 25000132 ZZH RX MED GY IP 250 OP 250 PS 637: Performed by: NURSE PRACTITIONER

## 2019-08-20 PROCEDURE — 80202 ASSAY OF VANCOMYCIN: CPT | Performed by: NEUROLOGICAL SURGERY

## 2019-08-20 PROCEDURE — 36415 COLL VENOUS BLD VENIPUNCTURE: CPT | Performed by: NURSE PRACTITIONER

## 2019-08-20 PROCEDURE — 85652 RBC SED RATE AUTOMATED: CPT | Performed by: NURSE PRACTITIONER

## 2019-08-20 PROCEDURE — 12000001 ZZH R&B MED SURG/OB UMMC

## 2019-08-20 PROCEDURE — 36415 COLL VENOUS BLD VENIPUNCTURE: CPT | Performed by: STUDENT IN AN ORGANIZED HEALTH CARE EDUCATION/TRAINING PROGRAM

## 2019-08-20 RX ADMIN — OXYCODONE HYDROCHLORIDE 10 MG: 5 TABLET ORAL at 00:53

## 2019-08-20 RX ADMIN — OXYCODONE HYDROCHLORIDE AND ACETAMINOPHEN 500 MG: 500 TABLET ORAL at 07:52

## 2019-08-20 RX ADMIN — FLUTICASONE FUROATE AND VILANTEROL TRIFENATATE 1 PUFF: 200; 25 POWDER RESPIRATORY (INHALATION) at 07:53

## 2019-08-20 RX ADMIN — OXYCODONE HYDROCHLORIDE 10 MG: 5 TABLET ORAL at 10:40

## 2019-08-20 RX ADMIN — MELATONIN 1000 UNITS: at 07:51

## 2019-08-20 RX ADMIN — OXYCODONE HYDROCHLORIDE 10 MG: 5 TABLET ORAL at 21:13

## 2019-08-20 RX ADMIN — ARIPIPRAZOLE 15 MG: 5 TABLET ORAL at 07:50

## 2019-08-20 RX ADMIN — SENNOSIDES AND DOCUSATE SODIUM 1 TABLET: 8.6; 5 TABLET ORAL at 07:51

## 2019-08-20 RX ADMIN — DULOXETINE HYDROCHLORIDE 120 MG: 60 CAPSULE, DELAYED RELEASE ORAL at 07:50

## 2019-08-20 RX ADMIN — OXYCODONE HYDROCHLORIDE 10 MG: 5 TABLET ORAL at 04:21

## 2019-08-20 RX ADMIN — OXYCODONE HYDROCHLORIDE 10 MG: 5 TABLET ORAL at 13:55

## 2019-08-20 RX ADMIN — CYCLOBENZAPRINE HYDROCHLORIDE 10 MG: 10 TABLET, FILM COATED ORAL at 13:55

## 2019-08-20 RX ADMIN — CYCLOBENZAPRINE HYDROCHLORIDE 10 MG: 10 TABLET, FILM COATED ORAL at 21:13

## 2019-08-20 RX ADMIN — ZINC SULFATE CAP 220 MG (50 MG ELEMENTAL ZN) 220 MG: 220 (50 ZN) CAP at 07:50

## 2019-08-20 RX ADMIN — CEFEPIME HYDROCHLORIDE 2 G: 2 INJECTION, POWDER, FOR SOLUTION INTRAVENOUS at 12:30

## 2019-08-20 RX ADMIN — OXYCODONE HYDROCHLORIDE 10 MG: 5 TABLET ORAL at 07:49

## 2019-08-20 RX ADMIN — CEFEPIME HYDROCHLORIDE 2 G: 2 INJECTION, POWDER, FOR SOLUTION INTRAVENOUS at 21:11

## 2019-08-20 RX ADMIN — CEFEPIME HYDROCHLORIDE 2 G: 2 INJECTION, POWDER, FOR SOLUTION INTRAVENOUS at 04:22

## 2019-08-20 RX ADMIN — ACETAMINOPHEN 975 MG: 325 TABLET, FILM COATED ORAL at 07:48

## 2019-08-20 RX ADMIN — OXYCODONE HYDROCHLORIDE AND ACETAMINOPHEN 500 MG: 500 TABLET ORAL at 21:13

## 2019-08-20 RX ADMIN — LISINOPRIL AND HYDROCHLOROTHIAZIDE 1 TABLET: 25; 20 TABLET ORAL at 08:02

## 2019-08-20 RX ADMIN — MONTELUKAST 10 MG: 10 TABLET, FILM COATED ORAL at 21:13

## 2019-08-20 RX ADMIN — VANCOMYCIN HYDROCHLORIDE 2000 MG: 10 INJECTION, POWDER, LYOPHILIZED, FOR SOLUTION INTRAVENOUS at 05:40

## 2019-08-20 RX ADMIN — SENNOSIDES AND DOCUSATE SODIUM 2 TABLET: 8.6; 5 TABLET ORAL at 21:13

## 2019-08-20 RX ADMIN — ACETAMINOPHEN 975 MG: 325 TABLET, FILM COATED ORAL at 00:50

## 2019-08-20 RX ADMIN — VANCOMYCIN HYDROCHLORIDE 2000 MG: 10 INJECTION, POWDER, LYOPHILIZED, FOR SOLUTION INTRAVENOUS at 17:22

## 2019-08-20 RX ADMIN — OXYCODONE HYDROCHLORIDE 5 MG: 5 TABLET ORAL at 17:23

## 2019-08-20 RX ADMIN — ACETAMINOPHEN 975 MG: 325 TABLET, FILM COATED ORAL at 17:23

## 2019-08-20 RX ADMIN — Medication 20000 UNITS: at 07:50

## 2019-08-20 ASSESSMENT — ACTIVITIES OF DAILY LIVING (ADL)
ADLS_ACUITY_SCORE: 15
ADLS_ACUITY_SCORE: 13
ADLS_ACUITY_SCORE: 15
ADLS_ACUITY_SCORE: 13
ADLS_ACUITY_SCORE: 15
ADLS_ACUITY_SCORE: 15
PREVIOUS_RESPONSIBILITIES: MEAL PREP;HOUSEKEEPING;LAUNDRY;SHOPPING;WORK;DRIVING

## 2019-08-20 NOTE — ANESTHESIA POSTPROCEDURE EVALUATION
Anesthesia POST Procedure Evaluation    Patient: Zayra Ramirez   MRN:     [id number] Gender:   female   Age:    49 year old :      1970        Preoperative Diagnosis: Epidural Abcess   Procedure(s):  LAMINECTOMY, SPINE, THORACIC, 11-12 and Part of Lumbar 1, DRAINAGE OF EPIDURAL ABCESS, Epidural Drain Placement X 2   Postop Comments: No value filed.       Anesthesia Type:  Not documented  General    Reportable Event: NO     PAIN: Uncomplicated   Sign Out status: Comfortable, Well controlled pain     PONV: No PONV   Sign Out status:  No Nausea or Vomiting     Neuro/Psych: Uneventful perioperative course   Sign Out Status: Preoperative baseline; Age appropriate mentation     Airway/Resp.: Uneventful perioperative course   Sign Out Status: Non labored breathing, age appropriate RR; Resp. Status within EXPECTED Parameters     CV: Uneventful perioperative course   Sign Out status: Appropriate BP and perfusion indices; Appropriate HR/Rhythm     Disposition:   Sign Out in:  PACU  Disposition:  Floor  Recovery Course: Uneventful  Follow-Up: Not required           Last Anesthesia Record Vitals:  CR[facility] VITALS  2019 0639 - 2019 0739      2019             Resp Rate (observed):  17          Last PACU Vitals:  Vitals Value Taken Time   /71 2019  8:13 AM   Temp 36.4  C (97.5  F) 2019  8:13 AM   Pulse 91 2019  8:25 PM   Resp 18 2019  8:13 AM   SpO2 98 % 2019  8:13 AM   Temp src     NIBP     Pulse     SpO2     Resp     Temp     Ht Rate     Temp 2           Electronically Signed By: Luiz Scott MD, 2019, 11:32 AM

## 2019-08-20 NOTE — PROGRESS NOTES
Care Coordinator Progress Note    Admission Date/Time:  8/18/2019  Attending MD:  Dr Yadiel Beal    Data  Chart reviewed, discussed with interdisciplinary team. In 6A Discharge Rounds Shara Negrete NP, reported cultures are pending, Infectious Disease following. On CPAP. Up with 1 assist and a walker. Has a hemovac.  Currently on IV Cefepime every 8 hours and IV Vanco every 12 hours.   Home PT recommended.     Patient was admitted for:  MRI concerning for T12-L1 spinal epidural abscess.  Pt presented to the ER with severe back pain and urinary incontinence. Pt had bilateral spinal cord stimulator placed on 8-08-19 at an outside facility, removed on 8-13. Having increased back pain, radiating to legs and loss of bladder control. To surgery emergently for abscess evacuation.   Post-op diagnosis:  Thoracolumbar epidural abscess.  Procedure:  T11-L1 laminectomy for decompression of epidural abscess.     Past history includes:  Obstructive sleep apnea; arthritis; asthma; dental abscess; depressive disorder; gastroesophageal reflux disease; emphysema; hypertension; morbid obesity. See Neurosurgery consult for complete history.     Concerns with insurance coverage for discharge needs: None. Pt's insurance is [employer].   Current Living Situation: Patient lives with family.  Support System: Supportive  Services Involved: Home Infusion  Barriers to Discharge: None at this time.     Coordination of Care and Referrals  Introduced myself to pt and explained I help with discharge planning. Pt was lying in bed resting, able to participate in conversation. Pt aware she may need home IV antibiotics. Pt said she has done home IVs before, a few years ago, could not remember the name of the agency. Said she will need a refresher on IV administration. Offered choice of home infusion agencies, no preference. Pt said she will have help at home.   On home oxygen at night thru [facility]. On home CPAP.    Confirmed pt's home address and phone number on the facesheet.     Referral made to [facility] for home IV antibiotics.   Received message from Umsan at [facility] regarding insurance coverage: pt has [employer] Medicare Plan with copay per dispense on the drug through Part D and then 100% coverage.       Assessment  Anticipate pt will need home IV antibiotics. Needs IV teaching.      Plan  Anticipated Discharge Date:  When final antibiotic regimen is determined.     Anticipated Discharge Plan:   Anticipate discharge home on IV antibiotics.   --[facility] for PICC line teaching.   --Home PT.   --[facility] will continue to follow for plan of care and discharge planning.        [facility] for IV antibiotics  Phone:  [phone]

## 2019-08-20 NOTE — PLAN OF CARE
Status: 8-19 T11-12 and L 1 drainage of abscess  Vitals: VSS .    Neuros: BLE 4/5. Intermittent numbness to outer right thigh.   IV: PIV SL b/w abx.  needed this shift. Lungs clear.   Diet: Regular diet.   Bowel status: +BS  : Voiding spont.  Skin: back dressing marked with no increase. Hemovac x2.Lower hemovac dressing saturated and was reinforced .  Pain: Given PO oxy & scheduled Tylenol for incisional back pain.  Activity: Up w/ 1, GB, walker, to chair,bathroom and hallways     Plan: Continue to increase activity as tolerated. Ask MD's about PICC line placement.

## 2019-08-20 NOTE — PLAN OF CARE
Status: s/p epidural abscess removal  Vitals: VSS w/ 3L through CPAP overnight.   Neuros: BLE 4/5. Intermittent numbness to outer right thigh.   IV: PIV SL b/w abx.   Resp/trach:  CPAP throughout night. Per report, pt wears NC during day to maintain sats above 90%. LS clear.   Diet: Regular diet.   Bowel status: no BM overnight.  : Hicks removed at 6AM. Voided 12.5 mL after removal.  Skin: Incision to mid back w/ marked dressing. Hemovac x2.  Pain: Given PO oxy & scheduled Tylenol for incisional back pain.  Activity: Up w/ 1, GB, walker.    Social: No visitors overnight.   Plan: Continue to monitor UO, pain, & follow POC.

## 2019-08-20 NOTE — PLAN OF CARE
"Status: s/p epidural abscess removal.  Vitals: WNL. Sats on 3L/nc low 90\"s. Weaned to RA, CPAP at HS with 3L o2.   Neuros: LE 4/5. Numbness on outer right thigh.   IV: 75cc/hr. Between antibiotics.   Resp/trach:  CPAP  at HS 3L. During day 2-3 l/NC.   Diet: Transitioned to a regular diet.   Bowel status: no BM this shift  : Hicks to SD. Hx of incont.   Skin: Inc covered. Dressing marked with drainage. Hemovac x2.  Pain: IV dilaudid x1. Given oxycodone PO as ordered.   Activity: Sat at BS and stood with PT,   Social: Family at BS most of day.   Plan: Cont to monitor. Notify MD with any changes.   "

## 2019-08-20 NOTE — PROGRESS NOTES
08/20/19 1100   Quick Adds   Type of Visit Initial Occupational Therapy Evaluation   Living Environment   Lives With significant other   Living Arrangements house   Home Accessibility stairs to enter home;stairs within home   Number of Stairs, Main Entrance 2   Stair Railings, Main Entrance none   Number of Stairs, Within Home, Primary other (see comments)  (flight to get to basement in laundry room)   Transportation Anticipated car, drives self;family or friend will provide   Living Environment Comment Pt reports she lives in a house with significant other, also has un-related son that she took in. Pt has sunk in living room with 1 step down, has stairs to basement for laundry, but does not need to access can have assist from family.    Self-Care   Usual Activity Tolerance good   Current Activity Tolerance fair   Regular Exercise Other (see comments)   Equipment Currently Used at Home other (see comments);walker, rolling;cane, straight   Activity/Exercise/Self-Care Comment Pt reports she does not use AD at baseline, but has a WW and SPC at home available for use, also reports she has a stool in the shower.    Functional Level   Ambulation 0-->independent   Transferring 0-->independent   Toileting 0-->independent   Bathing 0-->independent   Dressing 0-->independent   Eating 0-->independent   Communication 0-->understands/communicates without difficulty   Swallowing 0-->swallows foods/liquids without difficulty   Cognition 0 - no cognition issues reported   Fall history within last six months yes   Number of times patient has fallen within last six months 1   Which of the above functional risks had a recent onset or change? toileting;bathing;dressing   Prior Functional Level Comment Pt reports previously independence with all ADL   General Information   Onset of Illness/Injury or Date of Surgery - Date 08/18/19   Referring Physician Bernardo Almazan   Patient/Family Goals Statement To return home   Additional  Occupational Profile Info/Pertinent History of Current Problem Pt reports she lives in a house with significant other, also has un-related son that she took in. Pt has sunk in living room with 1 step down, has stairs to basement for laundry, but does not need to access can have assist from family.    Precautions/Limitations spinal precautions   General Observations Pt pleasant and agreeable to participate in therapy session   Cognitive Status Examination   Orientation orientation to person, place and time   Level of Consciousness alert   Follows Commands (Cognition) WNL   Memory intact   Attention No deficits were identified   Organization/Problem Solving No deficits were identified   Executive Function No deficits were identified   Visual Perception   Visual Perception No deficits were identified   Sensory Examination   Sensory Quick Adds No deficits were identified   Integumentary/Edema   Integumentary/Edema no deficits were identifed   Posture   Posture not impaired   Range of Motion (ROM)   ROM Quick Adds No deficits were identified   ROM Comment B UE WFL   Strength   Manual Muscle Testing Quick Adds No deficits were identified   Strength Comments B UE WFL   Coordination   Upper Extremity Coordination No deficits were identified   Transfer Skill: Sit to Stand   Level of [place]: Sit/Stand stand-by assist   Physical Assist/Nonphysical Assist: Sit/Stand 1 person assist   Instrumental Activities of Daily Living (IADL)   Previous Responsibilities meal prep;housekeeping;laundry;shopping;work;driving   Activities of Daily Living Analysis   Impairments Contributing to Impaired Activities of Daily Living post surgical precautions;pain   General Therapy Interventions   Planned Therapy Interventions ADL retraining;IADL retraining;transfer training;strengthening;home program guidelines;progressive activity/exercise   Clinical Impression   Criteria for Skilled Therapeutic Interventions Met yes, treatment indicated   OT  "Diagnosis decreased independence with ADL   Influenced by the following impairments post surgical precautions, pain   Assessment of Occupational Performance 3-5 Performance Deficits   Identified Performance Deficits dressing, bathing, toileting   Clinical Decision Making (Complexity) Low complexity   Therapy Frequency Daily   Predicted Duration of Therapy Intervention (days/wks) 3 days   Anticipated Equipment Needs at Discharge shower chair;reacher;raised toilet seat   Anticipated Discharge Disposition Home with Assist   Risks and Benefits of Treatment have been explained. Yes   Patient, Family & other staff in agreement with plan of care Yes   Clinical Impression Comments Pt presents with post surgical spinal precautions and pain resulting in decreased independence iwth ADL.  Pt will benefit from skilled OT services for education on compensatory techniques to increase independence iwht ADL.   [facility] [employer] TM \"6 Clicks\"   2016, Trustees of [facility], under license to [employer].  All rights reserved.   6 Clicks Short Forms Daily Activity Inpatient Short Form   [facility] [employer]  \"6 Clicks\" Daily Activity Inpatient Short Form   1. Putting on and taking off regular lower body clothing? 3 - A Little   2. Bathing (including washing, rinsing, drying)? 3 - A Little   3. Toileting, which includes using toilet, bedpan or urinal? 4 - None   4. Putting on and taking off regular upper body clothing? 4 - None   5. Taking care of personal grooming such as brushing teeth? 4 - None   6. Eating meals? 4 - None   Daily Activity Raw Score (Score out of 24.Lower scores equate to lower levels of function) 22   Total Evaluation Time   Total Evaluation Time (Minutes) 5     "

## 2019-08-20 NOTE — PLAN OF CARE
Discharge Planner PT   Patient plan for discharge: home   Current status: Pt educated on spinal precautions. Performed bed mobility and supine>sit with HOB elevated and SBA - log roll. STS with FWW and SBA. Ambulated ~160' with FWW and CGA. No overt LOB, dec gait speed and step length. Performed seated therex to promote LE strengthening and progression of activity tolerance.   Barriers to return to prior living situation: medical needs   Recommendations for discharge: anticipate dc home with support from family PRN and  PT for further strengthening and activity tolerance progression. Will need to initiate stairs prior to dc.   Rationale for recommendations: see above       Entered by: Jayshree Gayle 08/20/2019 9:24 AM

## 2019-08-20 NOTE — PROGRESS NOTES
"Neurosurgery Progress Note    S: States that pain is much improved overall, complains of incisional pain.      O:  /71 (BP Location: Left arm)   Pulse 91   Temp 97.5  F (36.4  C) (Oral)   Resp 18   Ht 1.6 m (5' 3\")   Wt 113.4 kg (250 lb)   LMP  (LMP Unknown)   SpO2 98%   BMI 44.29 kg/m    Exam:  General: Awake;  Alert, In No Acute Distress  Pulm: Breathing Comfortably on 2L nasal cannula   Mental status: Oriented x 4  Cranial Nerves: Cranial Nerves II-XII Intact Bilaterally  Strength:      Del Tr Bi WE WF Gr  R 5 5 5 5 5 5  L 5 5 5 5 5 5     HF KE KF DF PF   R 5 5 5 5 5   L 5 5 5 5 5     Pronator Drift: Absent  Sensory: Intact to Light Touch  Reflexes: No Hyperreflexia, Leon s or Clonus Present; Toes Down-Going Bilaterally  INCISION: covered     Assessment:   49 year old female s/p  T12 laminectomy and decompression for evacuation of epidural abscess    Plan:     Neuro:   Sutures out: 9/2  Drains: Surgical drains X 2 (to be kept in place until 8/26)    Cardiovascular: blood pressure goals: SBP < 160; continue home Lisinopril-HCTZ    Pulmonary: continue home Ventolin, Advair, singulair; Incentive spirometry     Gastrointestinal:  advance diet as tolerated    Renal: voiding normally    Heme: transfuse to maintain hemoglobin > 8;  INR < 1.5, fibrinogen < 200, or platelets < 100k     Endocrine: No issues     Infectious: Appreciate Infectious Disease consultation; Continue Vancomycin and Cefepime until cultures finalize.  Will obtain baseline CRP, ESR today.      PT/OT: pending     DVT prophylaxis: Sequential compression devices     Ulcer prophylaxis: none indicated     DISPO:   6A    Barriers: Surgical drains, evaluation by therapies, ambulating, BM/flatus, voiding without a Hicks, tolerating PO diet, pain controlled on PO medications      NICKO Greenwood, CNP  Department of Neurosurgery  Pager: [phone]    "

## 2019-08-21 ENCOUNTER — APPOINTMENT (OUTPATIENT)
Dept: PHYSICAL THERAPY | Facility: CLINIC | Age: 49
DRG: 029 | End: 2019-08-21
Payer: COMMERCIAL

## 2019-08-21 ENCOUNTER — APPOINTMENT (OUTPATIENT)
Dept: OCCUPATIONAL THERAPY | Facility: CLINIC | Age: 49
DRG: 029 | End: 2019-08-21
Payer: COMMERCIAL

## 2019-08-21 ENCOUNTER — APPOINTMENT (OUTPATIENT)
Dept: GENERAL RADIOLOGY | Facility: CLINIC | Age: 49
DRG: 029 | End: 2019-08-21
Attending: NURSE PRACTITIONER
Payer: COMMERCIAL

## 2019-08-21 LAB
BACTERIA SPEC CULT: ABNORMAL
CREAT SERPL-MCNC: 0.68 MG/DL (ref 0.52–1.04)
GFR SERPL CREATININE-BSD FRML MDRD: >90 ML/MIN/{1.73_M2}
Lab: ABNORMAL
SPECIMEN SOURCE: ABNORMAL

## 2019-08-21 PROCEDURE — 25000125 ZZHC RX 250: Performed by: NURSE PRACTITIONER

## 2019-08-21 PROCEDURE — 36569 INSJ PICC 5 YR+ W/O IMAGING: CPT

## 2019-08-21 PROCEDURE — 97116 GAIT TRAINING THERAPY: CPT | Mod: GP

## 2019-08-21 PROCEDURE — 25000132 ZZH RX MED GY IP 250 OP 250 PS 637: Performed by: STUDENT IN AN ORGANIZED HEALTH CARE EDUCATION/TRAINING PROGRAM

## 2019-08-21 PROCEDURE — 25800030 ZZH RX IP 258 OP 636: Performed by: EMERGENCY MEDICINE

## 2019-08-21 PROCEDURE — 25000128 H RX IP 250 OP 636: Performed by: NURSE PRACTITIONER

## 2019-08-21 PROCEDURE — 82565 ASSAY OF CREATININE: CPT | Performed by: NEUROLOGICAL SURGERY

## 2019-08-21 PROCEDURE — 97535 SELF CARE MNGMENT TRAINING: CPT | Mod: GO | Performed by: OCCUPATIONAL THERAPIST

## 2019-08-21 PROCEDURE — 97530 THERAPEUTIC ACTIVITIES: CPT | Mod: GP

## 2019-08-21 PROCEDURE — 12000001 ZZH R&B MED SURG/OB UMMC

## 2019-08-21 PROCEDURE — 27211391 ZZ H KIT, 6 FR TL BIOFLO OPEN ENDED PICC

## 2019-08-21 PROCEDURE — 25000128 H RX IP 250 OP 636: Performed by: EMERGENCY MEDICINE

## 2019-08-21 PROCEDURE — 27211414 ZZ H ADHESIVE SKIN CLOSURE, DERMABOND

## 2019-08-21 PROCEDURE — 25800030 ZZH RX IP 258 OP 636: Performed by: NURSE PRACTITIONER

## 2019-08-21 PROCEDURE — 97530 THERAPEUTIC ACTIVITIES: CPT | Mod: GO | Performed by: OCCUPATIONAL THERAPIST

## 2019-08-21 PROCEDURE — 40000986 XR CHEST PORT 1 VW

## 2019-08-21 PROCEDURE — 36415 COLL VENOUS BLD VENIPUNCTURE: CPT | Performed by: NEUROLOGICAL SURGERY

## 2019-08-21 PROCEDURE — 25000132 ZZH RX MED GY IP 250 OP 250 PS 637: Performed by: NURSE PRACTITIONER

## 2019-08-21 PROCEDURE — 97110 THERAPEUTIC EXERCISES: CPT | Mod: GP

## 2019-08-21 RX ORDER — CEFAZOLIN SODIUM 1 G/50ML
2 SOLUTION INTRAVENOUS EVERY 8 HOURS
Status: DISCONTINUED | OUTPATIENT
Start: 2019-08-21 | End: 2019-08-22 | Stop reason: HOSPADM

## 2019-08-21 RX ORDER — LIDOCAINE 40 MG/G
CREAM TOPICAL
Status: DISCONTINUED | OUTPATIENT
Start: 2019-08-21 | End: 2019-08-22 | Stop reason: HOSPADM

## 2019-08-21 RX ORDER — HEPARIN SODIUM,PORCINE 10 UNIT/ML
2-5 VIAL (ML) INTRAVENOUS
Status: DISCONTINUED | OUTPATIENT
Start: 2019-08-21 | End: 2019-08-22 | Stop reason: HOSPADM

## 2019-08-21 RX ADMIN — OXYCODONE HYDROCHLORIDE 10 MG: 5 TABLET ORAL at 16:26

## 2019-08-21 RX ADMIN — FLUTICASONE FUROATE AND VILANTEROL TRIFENATATE 1 PUFF: 200; 25 POWDER RESPIRATORY (INHALATION) at 08:47

## 2019-08-21 RX ADMIN — OXYCODONE HYDROCHLORIDE AND ACETAMINOPHEN 500 MG: 500 TABLET ORAL at 08:41

## 2019-08-21 RX ADMIN — CYCLOBENZAPRINE HYDROCHLORIDE 10 MG: 10 TABLET, FILM COATED ORAL at 08:41

## 2019-08-21 RX ADMIN — ARIPIPRAZOLE 15 MG: 5 TABLET ORAL at 08:41

## 2019-08-21 RX ADMIN — ACETAMINOPHEN 975 MG: 325 TABLET, FILM COATED ORAL at 08:47

## 2019-08-21 RX ADMIN — SENNOSIDES AND DOCUSATE SODIUM 2 TABLET: 8.6; 5 TABLET ORAL at 19:21

## 2019-08-21 RX ADMIN — MELATONIN 1000 UNITS: at 08:41

## 2019-08-21 RX ADMIN — CYCLOBENZAPRINE HYDROCHLORIDE 10 MG: 10 TABLET, FILM COATED ORAL at 13:06

## 2019-08-21 RX ADMIN — ACETAMINOPHEN 975 MG: 325 TABLET, FILM COATED ORAL at 16:26

## 2019-08-21 RX ADMIN — CEFEPIME HYDROCHLORIDE 2 G: 2 INJECTION, POWDER, FOR SOLUTION INTRAVENOUS at 13:05

## 2019-08-21 RX ADMIN — LIDOCAINE HYDROCHLORIDE 1 ML: 20 INJECTION, SOLUTION INFILTRATION; PERINEURAL at 11:40

## 2019-08-21 RX ADMIN — SENNOSIDES AND DOCUSATE SODIUM 2 TABLET: 8.6; 5 TABLET ORAL at 08:42

## 2019-08-21 RX ADMIN — OXYCODONE HYDROCHLORIDE 10 MG: 5 TABLET ORAL at 19:21

## 2019-08-21 RX ADMIN — LISINOPRIL AND HYDROCHLOROTHIAZIDE 1 TABLET: 25; 20 TABLET ORAL at 08:47

## 2019-08-21 RX ADMIN — ZINC SULFATE CAP 220 MG (50 MG ELEMENTAL ZN) 220 MG: 220 (50 ZN) CAP at 08:41

## 2019-08-21 RX ADMIN — DULOXETINE HYDROCHLORIDE 120 MG: 60 CAPSULE, DELAYED RELEASE ORAL at 08:41

## 2019-08-21 RX ADMIN — ACETAMINOPHEN 975 MG: 325 TABLET, FILM COATED ORAL at 00:46

## 2019-08-21 RX ADMIN — CEFEPIME HYDROCHLORIDE 2 G: 2 INJECTION, POWDER, FOR SOLUTION INTRAVENOUS at 05:22

## 2019-08-21 RX ADMIN — OXYCODONE HYDROCHLORIDE 10 MG: 5 TABLET ORAL at 08:42

## 2019-08-21 RX ADMIN — OXYCODONE HYDROCHLORIDE 10 MG: 5 TABLET ORAL at 13:06

## 2019-08-21 RX ADMIN — OXYCODONE HYDROCHLORIDE 10 MG: 5 TABLET ORAL at 04:20

## 2019-08-21 RX ADMIN — CYCLOBENZAPRINE HYDROCHLORIDE 10 MG: 10 TABLET, FILM COATED ORAL at 19:21

## 2019-08-21 RX ADMIN — VANCOMYCIN HYDROCHLORIDE 2000 MG: 10 INJECTION, POWDER, LYOPHILIZED, FOR SOLUTION INTRAVENOUS at 06:17

## 2019-08-21 RX ADMIN — OXYCODONE HYDROCHLORIDE AND ACETAMINOPHEN 500 MG: 500 TABLET ORAL at 19:21

## 2019-08-21 RX ADMIN — OXYCODONE HYDROCHLORIDE 10 MG: 5 TABLET ORAL at 00:46

## 2019-08-21 RX ADMIN — CEFAZOLIN SODIUM 2 G: 10 INJECTION, POWDER, FOR SOLUTION INTRAVENOUS at 18:14

## 2019-08-21 RX ADMIN — MONTELUKAST 10 MG: 10 TABLET, FILM COATED ORAL at 22:24

## 2019-08-21 RX ADMIN — Medication 20000 UNITS: at 08:41

## 2019-08-21 ASSESSMENT — ACTIVITIES OF DAILY LIVING (ADL)
ADLS_ACUITY_SCORE: 12

## 2019-08-21 ASSESSMENT — VISUAL ACUITY
OU: NORMAL ACUITY

## 2019-08-21 NOTE — PHARMACY-VANCOMYCIN DOSING SERVICE
Pharmacy Vancomycin Note  Date of Service 2019  Patient's  1970   49 year old, female    Indication:  epidural abscess   Goal Trough Level: 15-20 mg/L  Day of Therapy: 2  Current Vancomycin regimen:  2000 mg IV q12h    Current estimated CrCl = Estimated Creatinine Clearance: 149 mL/min (based on SCr of 0.56 mg/dL).    Creatinine for last 3 days  2019:  9:26 PM Creatinine 0.69 mg/dL  2019:  6:16 AM Creatinine 0.56 mg/dL    Recent Vancomycin Levels (past 3 days)  2019:  5:08 PM Vancomycin Level 18.3 mg/L    Vancomycin IV Administrations (past 72 hours)                   vancomycin (VANCOCIN) 2,000 mg in sodium chloride 0.9 % 500 mL intermittent infusion (mg) 2,000 mg New Bag 19 1722     2,000 mg New Bag  0540     2,000 mg New Bag 19 1708     2,000 mg New Bag  0607                Nephrotoxins and other renal medications (From now, onward)    Start     Dose/Rate Route Frequency Ordered Stop    19 1000  lisinopril-hydrochlorothiazide (PRINZIDE/ZESTORETIC) 20-25 MG per tablet 1 tablet      1 tablet Oral DAILY 19 0857      19 0530  vancomycin (VANCOCIN) 2,000 mg in sodium chloride 0.9 % 500 mL intermittent infusion      2,000 mg  over 2 Hours Intravenous EVERY 12 HOURS 19 0423               Contrast Orders - past 72 hours (72h ago, onward)    Start     Dose/Rate Route Frequency Ordered Stop    19 2340  gadobutrol (GADAVIST) injection 10 mL      10 mL Intravenous ONCE 19 2339 19 2340          Interpretation of levels and current regimen:  Trough level is ~11.5hr trough and is  Therapeutic    Has serum creatinine changed > 50% in last 72 hours: No    Urine output:  good urine output    Renal Function: Stable    Plan:  1.  Continue Current Dose  2.  Pharmacy will check trough levels as appropriate in 1-3 Days.    3. Serum creatinine levels will be ordered daily for the first week of therapy and at least twice weekly for subsequent weeks.       Angelina Schaeffer, PharmD

## 2019-08-21 NOTE — PROGRESS NOTES
GENERAL ID SERVICE: PROGRESS NOTE  Patient:  Zayra Ramirez, Date of birth 1970, Medical record number [id number]  Date of Admission: 8/18/2019  Date of Visit:  8/21/2019         Assessment and Recommendations:   Problem List:      # Increasing back pain after spinal cord stimulator removal + episode incontinence  of urinary and MRI showing  epidural abscess within the previous neurostimulator tract from approximately T11-L1  - S/p Laminectomy with epidural abscess evacuation - epidural abscess cultures positive for MSSA     #  Chronic back pain s/p bilateral trial spinal cord stimulator on 8/8/19 (removed 8/13)     # RA onweekly methotrexate     # ILD on chronic O2     # Asthma      Discussion:    Mrs. Zayra Ramirez  49 year old female admitted don 8/18/19. She has PMHx of  RA on weekly methotrexate, ILD on chronic O2 use, asthma, smoking history, HTN, morbid obesity, ROBERT, hysterectomy, and chronic back pain s/p bilateral trial spinal cord stimulator on 8/8/19 (removed 8/13 given worsening back pain) who presents with her father for evaluation of lower back pain. MRI lumbar spine was significant for posterior epidural abscess within the previous neurostimulator tract from approximately T11-L1. This produces moderate to severe spinal canal stenosis at the level of T12-L1 compressing the conus medullaris. She also has urinary incontinence. White count was 14.3. CRP is 240 and ESR is 53.      Neurosurgery evaluated the patient on 8/19 and proceeded with emergent OR exploration for laminectomy (at the level of T12) and epidural abscess evacuation. Blood cultures were obtained and vancomycin and cefepime were started. Per surgical report   there was overt purulence that was seen both at the cranial and caudal aspects of the incision. Intraoperative cultures were obtained and are positive for MSSA. Blood cultures are negative to date.        I would recommend to narrow down antibiotics to cefazolin 2 grams IV q  8h. Duration will be at least 6 weeks. She will need a repeat MRI lumbar spine at the end of treatment.       Recommendations:    1. Please discontinue vancomycin and cefepime  2. Please start cefazolin 2 grams IV q8h. Duration will be at least 6 weeks counting from the epidural abscess drainage (8/19). Last day of treatment will be September 30, 2019.  3. Patient will need a repeat MRI lumbar spine close to the end of 6 weeks of treatment (see item 6)  4. PICC line already placed  5. While on antibiotics, please obtain weekly CBC, CMP, ESR and CRP (daily CBC and creatinine while at the hospital). When she is in the outpatient setting, the results will need to be faxed to the ID clinic ()  6. Follow up schedule in the ID clinic for September 25, 2019. I will call clinic and request to schedule the follow up appointment. Once I hear from the clinic in regards of the follow up appointment I will place a miscellaneous note. I will ask the clinic Sarah to call the patient confirming the appointment.  Please obtain an MRI lumbar spine one day before the appointment.   7. I will sign off at this moment. Please see sign off recommendations above. Please page me ([phone]) if any question.      PS.: I see the the white count is 15.3, it could be related to the inflammatory reaction after surgery since the patient is otherwise improving clinically, afebrile and with improvement of the back pain. If by tomorrow the white count is not trending down please page me ([phone]).       Elizabeth Elizabeth MD  Date of Service : 08/21/19  Pager: [phone]          Interval History:       - No fever  - WBC: 15.3  - CRP: 240 - 120    Micro:  - 8/19 -  Epidural fluid - Gram stain with GPC and culture  With heavy growth of MSSA    ATB:  Vancomycin started on 8/19  Cefepime started on 8/19         History of Present Illness:      Mrs. Zayra Ramirez is a 49 year old female admitted don 8/18/19. She has PMHx of  RA on weekly methotrexate, ILD  on chronic O2 use, asthma, smoking history, HTN, morbid obesity, ROBERT, hysterectomy, and chronic back pain s/p bilateral trial spinal cord stimulator on 8/8/19 (removed 8/13 given worsening back pain) who presents with her father for evaluation of lower back pain.         Patient reports she's had new pain in her bilateral lower back since her spinal cord stimulator was removed. She states this pain feels different from her prior chronic pains. It originates from where her stimulator was placed and radiates up her back and down into her upper b/l legs. She states this pain prompted her to undergo an MRI on Friday, 8/16/19, at [facility] in [place] (results not available through Care Everywhere). Patient reports she was called with the results by her physician and told her MRI was notable for fluid build-up in her back. She was prescribed Methylprednisolone and tizanidine for this and has been taking Percocet 5 mg 3 times a day as well. With her pain, the patient has also had 2 episodes where she lost control of her bladder.  On both 8/12 and 8/17 she felt a sudden need to use the restroom, was unable to make it in time, and wet herself. Otherwise, she denies loss of bowel control, fevers, numbness/weakness in extremities, saddle anesthesias, nausea, vomiting, abdominal/chest pain, or shortness of breath. Denies recent falls or traumas. Denies drainage or bleeding from her incision sites.      Patient presented to the ED and underwent an MRI of the lumbar spine 0n 8/18 overnight which showed suspect posterior epidural abscess within the previous neurostimulator tract from approximately T11-L1. This produces moderate to severe spinal canal stenosis at the level of T12-L1 compressing the conus medullaris, although there is no definite myelopathic signal. There is extension this fluid collection into the left T12-L1 neural foramen. It also showed enhancement along the insertion tract at approximately L3-4 with probable  "reactive myositis in the right paraspinous musculature at L2-3.     Neurosurgery evaluated the patient on 8/19 and proceeded with emergent OR exploration for laminectomy (at the level of T12) and epidural abscess evacuation. Blood cultures were obtained and vancomycin and cefepime were started. Per surgical report  there was overt purulence that was seen both at the cranial and caudal aspects of the incision. Intraoperative cultures were obtained. Gram stain showed presence of Gram positive cocci. Cultures are pending.      Patient is afebrile and white count is 14.3. CRP is 240 and ESR is 53. Renal function is normal.              Physical Exam:   /89   Pulse 91   Temp 97  F (36.1  C) (Oral)   Resp 16   Ht 1.6 m (5' 3\")   Wt 113.4 kg (250 lb)   LMP  (LMP Unknown)   SpO2 99%   BMI 44.29 kg/m       Exam:  GENERAL:  Not in acute distress  HEAD: Normocephalic and atraumatic  ENT:  No hearing impairment, no ear pain or exudate, ear canals and TM's normal, nose and mouth without ulcers or lesions, oropharynx clear, oral mucous membranes moist, no tonsillar erythema and edema, oropharynx is without exudates or ulcers.  EYES:  Eyes grossly normal to inspection, PERRL and conjunctivae and sclerae normal   NECK:  Supple, no adenopathy, no asymmetry, masses, or scars and thyroid normal to palpation  LUNGS:  Clear to auscultation - no rales, rhonchi or wheezes  CARDIOVASCULAR:  Regular rate and rhythm, normal S1 S2, no S3 or S4, no murmur, click or rub, no peripheral edema and peripheral pulses strong  ABDOMEN:  Soft, nontender, no hepatosplenomegaly, no masses and bowel sounds normal  EXT: Extremities warm and without edema.  NEUROLOGIC:  Back pain improving  PSYCHIATRIC: Mood stable, mentation appears normal, affect normal              Laboratory Data:     Creatinine   Date Value Ref Range Status   08/21/2019 0.68 0.52 - 1.04 mg/dL Final   08/20/2019 0.56 0.52 - 1.04 mg/dL Final   08/18/2019 0.69 0.52 - 1.04 " mg/dL Final   06/28/2019 0.76 0.52 - 1.04 mg/dL Final   04/23/2019 0.81 0.52 - 1.04 mg/dL Final     WBC   Date Value Ref Range Status   08/20/2019 15.3 (H) 4.0 - 11.0 10e9/L Final   08/18/2019 14.3 (H) 4.0 - 11.0 10e9/L Final   06/28/2019 10.2 4.0 - 11.0 10e9/L Final   04/23/2019 9.9 4.0 - 11.0 10e9/L Final   02/20/2019 8.8 4.0 - 11.0 10e9/L Final     Hemoglobin   Date Value Ref Range Status   08/20/2019 11.2 (L) 11.7 - 15.7 g/dL Final     Platelet Count   Date Value Ref Range Status   08/20/2019 363 150 - 450 10e9/L Final     Lab Results   Component Value Date     08/20/2019    BUN 11 08/20/2019    CO2 29 08/20/2019     CRP Inflammation   Date Value Ref Range Status   08/20/2019 120.0 (H) 0.0 - 8.0 mg/L Final   08/18/2019 240.0 (H) 0.0 - 8.0 mg/L Final   04/23/2019 15.6 (H) 0.0 - 8.0 mg/L Final   02/20/2019 12.4 (H) 0.0 - 8.0 mg/L Final   04/06/2018 12.9 (H) 0.0 - 8.0 mg/L Final           Pertinent Recent Microbiology Data:     Recent Labs   Lab 08/19/19  1402 08/19/19  1353 08/19/19  0615 08/19/19  0606 08/19/19  0546   CULT No growth after 2 days No growth after 2 days Heavy growth  Staphylococcus aureus  *  Culture negative after 1 day  Culture negative monitoring continues Moderate growth  Staphylococcus aureus  Susceptibility testing done on previous specimen  *  Culture negative after 1 day  Culture negative monitoring continues Heavy growth  Staphylococcus aureus  *  Culture in progress  Culture negative after 1 day  Culture negative monitoring continues   SDES Blood Left Hand Blood Left Arm Fluid additional epidural Spine Lumbar specimen 3  Fluid additional epidural Spine Lumbar specimen 3  Fluid additional epidural Spine Lumbar specimen 3  Fluid additional epidural Spine Lumbar specimen 3 Ligament Interspinal Thoracic Spine Tissue specimen 2  Ligament Interspinal Thoracic Spine Tissue specimen 2  Ligament Interspinal Thoracic Spine Tissue specimen 2  Ligament Interspinal Thoracic Spine  Tissue specimen 2 Fluid epidural Spine specimen 1  Fluid epidural Spine specimen 1  Fluid epidural Spine specimen 1  Fluid epidural Spine specimen 1            Imaging:   No results found for this or any previous visit (from the past 48 hour(s)).

## 2019-08-21 NOTE — PROGRESS NOTES
"Neurosurgery Progress Note    S: Incisional pain present, but controlled.  Intraoperative cultures revealed staph aureus, awaiting final ID recommendations.      O:  /89   Pulse 91   Temp 97  F (36.1  C) (Oral)   Resp 16   Ht 1.6 m (5' 3\")   Wt 113.4 kg (250 lb)   LMP  (LMP Unknown)   SpO2 99%   BMI 44.29 kg/m    Exam:  General: Awake;  Alert, In No Acute Distress  Pulm: Breathing Comfortably on 2L nasal cannula   Mental status: Oriented x 4  Cranial Nerves: Cranial Nerves II-XII Intact Bilaterally  Strength:      Del Tr Bi WE WF Gr  R 5 5 5 5 5 5  L 5 5 5 5 5 5     HF KE KF DF PF   R 5 5 5 5 5   L 5 5 5 5 5     Pronator Drift: Absent  Sensory: Intact to Light Touch  Reflexes: No Hyperreflexia, Leon s or Clonus Present; Toes Down-Going Bilaterally  INCISION: covered     Assessment:   49 year old female s/p  T12 laminectomy and decompression for evacuation of epidural abscess    Plan:     Neuro:   Sutures out: 9/2  Drains: Surgical drains X 2 (to be kept in place until 8/26)    Cardiovascular: blood pressure goals: SBP < 160; continue home Lisinopril-HCTZ    Pulmonary: continue home Ventolin, Advair, singulair; Incentive spirometry     Gastrointestinal:  advance diet as tolerated    Renal: voiding normally    Heme: transfuse to maintain hemoglobin > 8;  INR < 1.5, fibrinogen < 200, or platelets < 100k     Endocrine: No issues     Infectious: Appreciate Infectious Disease consultation; Continue Vancomycin and Cefepime.  Intraoperative cultures revealed heavy growth Staph Aureus.  PICC line placement anticipated 8/21    PT/OT: pending     DVT prophylaxis: Sequential compression devices     Ulcer prophylaxis: none indicated     DISPO:   6A    Barriers: Surgical drains, evaluation by therapies, ambulating, BM/flatus, voiding without a Hicks, tolerating PO diet, pain controlled on PO medications      NICKO Greenwood, CNP  Department of Neurosurgery  Pager: [phone]    "

## 2019-08-21 NOTE — PLAN OF CARE
Status: Pt POD #2 T11-12 abscess drainage   Vitals: VSS on RA  Neuros: AO4, strength 5/5 throughout. Denied numbness/tingling to RLE  IV: PIV SL. TL PICC HL in between abx  Resp/trach: LS diminished at the bases. CPAP at night   Diet: Regular diet, no nausea. Tolerating well  Bowel status: No BM since 8/19, senna given   : Voiding spontaneously   Skin: Posterior back incision covered with marked drainage. Hemovac x 2 with minimal drainage   Pain: Controlled with PRN oxycodone and tylenol, scheduled flexeril   Activity: Up in room SBA  Social: Mother at bedside, supportive   Plan: Plan to discharge tomorrow. Continue to monitor and follow POC

## 2019-08-21 NOTE — PLAN OF CARE
Discharge Planner OT   Patient plan for discharge: Home with family  Current status: Therapist issued handout on ADL completion within spinal precautions and Pt verbalized understanding, Pt then ambulated ~160 ft with SBA using 2WW, Pt fatigued at end of activity  Barriers to return to prior living situation: Post surgical precautions  Recommendations for discharge: Home with assist  Rationale for recommendations: Pt may benefit from assistance with heavy ADL/IADL       Entered by: Rigo Carrasco 08/21/2019 3:53 PM

## 2019-08-21 NOTE — PLAN OF CARE
PT - 6A  Discharge Planner PT   Patient plan for discharge: not discussed during session  Current status: Pt performs sit<>stand transfer using FWW with SBA. Ambulates 135ft with FWW and CGA. Decreased gait speed d/t decreased activity tolerance. Performed seated leg exercises during session as pt feels nervous about stairs. She agrees to attempt steps at next session.  Barriers to return to prior living situation: medical status, decrease activity tolerance, stairs at home  Recommendations for discharge: home with PT and family assist  Rationale for recommendations: Pt demonstrates good mobility but is limited d/t decreased activity tolerance. She would benefit from continued skilled PT in order to improve functional endurance for gait and functional mobility, and LE strengthening.  Entered by: Rose Sarmiento 08/21/2019 4:13 PM

## 2019-08-21 NOTE — PLAN OF CARE
Status: POD 1 T 11-12 abscess drainage.  Vitals: VSS  Neuros: A/O x4. PERRLA, 5/5 strength throughout. Intermittent numbness of R thigh.  IV: PIV SL between IV ABX  Resp/trach: Ra, Wears CPAP overnight. Lungs clear  Diet: Reg diet, Fair appetite  Bowel status: BM 8/19. BS active. Senna Given  : Voiding  Skin: Back incision covered w/primapore, marked, WNL. 2 Hemovac to incision  Pain: Incisional pain managed w/PRN oxycodone and sched. Tylenol.  Activity: Up w/1 and Gb, Walker  Social: Multiple visitors this shift.  Plan: Continue to monitor and follow POC

## 2019-08-22 ENCOUNTER — APPOINTMENT (OUTPATIENT)
Dept: GENERAL RADIOLOGY | Facility: CLINIC | Age: 49
DRG: 029 | End: 2019-08-22
Attending: NURSE PRACTITIONER
Payer: COMMERCIAL

## 2019-08-22 ENCOUNTER — APPOINTMENT (OUTPATIENT)
Dept: PHYSICAL THERAPY | Facility: CLINIC | Age: 49
DRG: 029 | End: 2019-08-22
Payer: COMMERCIAL

## 2019-08-22 ENCOUNTER — HOME INFUSION (PRE-WILLOW HOME INFUSION) (OUTPATIENT)
Dept: PHARMACY | Facility: CLINIC | Age: 49
End: 2019-08-22

## 2019-08-22 VITALS
HEART RATE: 98 BPM | TEMPERATURE: 98.8 F | WEIGHT: 250 LBS | RESPIRATION RATE: 16 BRPM | BODY MASS INDEX: 44.3 KG/M2 | OXYGEN SATURATION: 94 % | DIASTOLIC BLOOD PRESSURE: 72 MMHG | SYSTOLIC BLOOD PRESSURE: 148 MMHG | HEIGHT: 63 IN

## 2019-08-22 LAB
CREAT SERPL-MCNC: 0.69 MG/DL (ref 0.52–1.04)
ERYTHROCYTE [DISTWIDTH] IN BLOOD BY AUTOMATED COUNT: 13.3 % (ref 10–15)
GFR SERPL CREATININE-BSD FRML MDRD: >90 ML/MIN/{1.73_M2}
HCT VFR BLD AUTO: 39.4 % (ref 35–47)
HGB BLD-MCNC: 12.6 G/DL (ref 11.7–15.7)
MAGNESIUM SERPL-MCNC: 2.2 MG/DL (ref 1.6–2.3)
MCH RBC QN AUTO: 31.3 PG (ref 26.5–33)
MCHC RBC AUTO-ENTMCNC: 32 G/DL (ref 31.5–36.5)
MCV RBC AUTO: 98 FL (ref 78–100)
PHOSPHATE SERPL-MCNC: 4.9 MG/DL (ref 2.5–4.5)
PLATELET # BLD AUTO: 421 10E9/L (ref 150–450)
POTASSIUM SERPL-SCNC: 3.9 MMOL/L (ref 3.4–5.3)
RBC # BLD AUTO: 4.03 10E12/L (ref 3.8–5.2)
WBC # BLD AUTO: 10.6 10E9/L (ref 4–11)

## 2019-08-22 PROCEDURE — 27211383 ZZ H DEVICE 5FR SECURACATH

## 2019-08-22 PROCEDURE — 40000986 XR CHEST 1 VW

## 2019-08-22 PROCEDURE — 83735 ASSAY OF MAGNESIUM: CPT | Performed by: NEUROLOGICAL SURGERY

## 2019-08-22 PROCEDURE — 25000128 H RX IP 250 OP 636: Performed by: NURSE PRACTITIONER

## 2019-08-22 PROCEDURE — 36569 INSJ PICC 5 YR+ W/O IMAGING: CPT

## 2019-08-22 PROCEDURE — 25000125 ZZHC RX 250: Performed by: NURSE PRACTITIONER

## 2019-08-22 PROCEDURE — 27211414 ZZ H ADHESIVE SKIN CLOSURE, DERMABOND

## 2019-08-22 PROCEDURE — 97116 GAIT TRAINING THERAPY: CPT | Mod: GP

## 2019-08-22 PROCEDURE — 36415 COLL VENOUS BLD VENIPUNCTURE: CPT | Performed by: NEUROLOGICAL SURGERY

## 2019-08-22 PROCEDURE — 25800030 ZZH RX IP 258 OP 636: Performed by: NURSE PRACTITIONER

## 2019-08-22 PROCEDURE — 36415 COLL VENOUS BLD VENIPUNCTURE: CPT | Performed by: NURSE PRACTITIONER

## 2019-08-22 PROCEDURE — 25000132 ZZH RX MED GY IP 250 OP 250 PS 637: Performed by: STUDENT IN AN ORGANIZED HEALTH CARE EDUCATION/TRAINING PROGRAM

## 2019-08-22 PROCEDURE — 25000132 ZZH RX MED GY IP 250 OP 250 PS 637: Performed by: NURSE PRACTITIONER

## 2019-08-22 PROCEDURE — 27211417 ZZ H KIT, VPS RHYTHM STYLET

## 2019-08-22 PROCEDURE — 85027 COMPLETE CBC AUTOMATED: CPT | Performed by: NURSE PRACTITIONER

## 2019-08-22 PROCEDURE — 84132 ASSAY OF SERUM POTASSIUM: CPT | Performed by: NEUROLOGICAL SURGERY

## 2019-08-22 PROCEDURE — 84100 ASSAY OF PHOSPHORUS: CPT | Performed by: NEUROLOGICAL SURGERY

## 2019-08-22 PROCEDURE — 82565 ASSAY OF CREATININE: CPT | Performed by: NEUROLOGICAL SURGERY

## 2019-08-22 PROCEDURE — 97530 THERAPEUTIC ACTIVITIES: CPT | Mod: GP

## 2019-08-22 PROCEDURE — 97110 THERAPEUTIC EXERCISES: CPT | Mod: GP

## 2019-08-22 PROCEDURE — 40000802 ZZH SITE CHECK

## 2019-08-22 PROCEDURE — 27210577 ZZ H INTRODUCER MICRO SET

## 2019-08-22 PROCEDURE — 25000125 ZZHC RX 250: Performed by: STUDENT IN AN ORGANIZED HEALTH CARE EDUCATION/TRAINING PROGRAM

## 2019-08-22 PROCEDURE — 27211389 ZZ H KIT, 5 FR DL BIOFLO OPEN ENDED PICC

## 2019-08-22 RX ORDER — CYCLOBENZAPRINE HCL 10 MG
10 TABLET ORAL 3 TIMES DAILY
Qty: 30 TABLET | Refills: 0 | Status: SHIPPED | OUTPATIENT
Start: 2019-08-22 | End: 2021-04-12

## 2019-08-22 RX ORDER — ACETAMINOPHEN 325 MG/1
650 TABLET ORAL EVERY 4 HOURS PRN
COMMUNITY
Start: 2019-08-22 | End: 2019-11-20

## 2019-08-22 RX ORDER — LIDOCAINE 40 MG/G
CREAM TOPICAL
Status: DISCONTINUED | OUTPATIENT
Start: 2019-08-22 | End: 2019-08-22 | Stop reason: HOSPADM

## 2019-08-22 RX ORDER — ZINC SULFATE 50(220)MG
220 CAPSULE ORAL
Status: ON HOLD | COMMUNITY
Start: 2019-08-23 | End: 2022-12-06

## 2019-08-22 RX ORDER — ASCORBIC ACID 500 MG
500 TABLET ORAL
Status: ON HOLD | COMMUNITY
Start: 2019-08-22 | End: 2022-07-14

## 2019-08-22 RX ORDER — CEFAZOLIN SODIUM 1 G/50ML
2 SOLUTION INTRAVENOUS EVERY 8 HOURS
Qty: 30 EACH | Refills: 11 | COMMUNITY
Start: 2019-08-22 | End: 2019-11-20

## 2019-08-22 RX ORDER — AMOXICILLIN 250 MG
1 CAPSULE ORAL 2 TIMES DAILY
Qty: 45 TABLET | Refills: 0 | Status: SHIPPED | OUTPATIENT
Start: 2019-08-22 | End: 2019-11-20

## 2019-08-22 RX ORDER — OXYCODONE HYDROCHLORIDE 5 MG/1
5-10 TABLET ORAL
Qty: 45 TABLET | Refills: 0 | Status: SHIPPED | OUTPATIENT
Start: 2019-08-22 | End: 2019-11-20

## 2019-08-22 RX ORDER — HEPARIN SODIUM,PORCINE 10 UNIT/ML
2-5 VIAL (ML) INTRAVENOUS
Status: DISCONTINUED | OUTPATIENT
Start: 2019-08-22 | End: 2019-08-22 | Stop reason: HOSPADM

## 2019-08-22 RX ADMIN — DULOXETINE HYDROCHLORIDE 120 MG: 60 CAPSULE, DELAYED RELEASE ORAL at 08:23

## 2019-08-22 RX ADMIN — CYCLOBENZAPRINE HYDROCHLORIDE 10 MG: 10 TABLET, FILM COATED ORAL at 08:22

## 2019-08-22 RX ADMIN — CYCLOBENZAPRINE HYDROCHLORIDE 10 MG: 10 TABLET, FILM COATED ORAL at 14:50

## 2019-08-22 RX ADMIN — ZINC SULFATE CAP 220 MG (50 MG ELEMENTAL ZN) 220 MG: 220 (50 ZN) CAP at 08:22

## 2019-08-22 RX ADMIN — ACETAMINOPHEN 975 MG: 325 TABLET, FILM COATED ORAL at 02:26

## 2019-08-22 RX ADMIN — FLUTICASONE FUROATE AND VILANTEROL TRIFENATATE 1 PUFF: 200; 25 POWDER RESPIRATORY (INHALATION) at 08:23

## 2019-08-22 RX ADMIN — OXYCODONE HYDROCHLORIDE AND ACETAMINOPHEN 500 MG: 500 TABLET ORAL at 08:22

## 2019-08-22 RX ADMIN — CEFAZOLIN SODIUM 2 G: 10 INJECTION, POWDER, FOR SOLUTION INTRAVENOUS at 09:58

## 2019-08-22 RX ADMIN — POTASSIUM CHLORIDE 20 MEQ: 750 TABLET, EXTENDED RELEASE ORAL at 08:23

## 2019-08-22 RX ADMIN — MELATONIN 1000 UNITS: at 08:22

## 2019-08-22 RX ADMIN — LIDOCAINE HYDROCHLORIDE 5 ML: 10 INJECTION, SOLUTION EPIDURAL; INFILTRATION; INTRACAUDAL; PERINEURAL at 14:15

## 2019-08-22 RX ADMIN — OXYCODONE HYDROCHLORIDE 10 MG: 5 TABLET ORAL at 08:19

## 2019-08-22 RX ADMIN — CEFAZOLIN SODIUM 2 G: 10 INJECTION, POWDER, FOR SOLUTION INTRAVENOUS at 02:33

## 2019-08-22 RX ADMIN — ARIPIPRAZOLE 15 MG: 5 TABLET ORAL at 08:20

## 2019-08-22 RX ADMIN — SENNOSIDES AND DOCUSATE SODIUM 2 TABLET: 8.6; 5 TABLET ORAL at 08:22

## 2019-08-22 RX ADMIN — Medication 20000 UNITS: at 08:21

## 2019-08-22 RX ADMIN — LISINOPRIL AND HYDROCHLOROTHIAZIDE 1 TABLET: 25; 20 TABLET ORAL at 09:58

## 2019-08-22 RX ADMIN — ACETAMINOPHEN 650 MG: 325 TABLET, FILM COATED ORAL at 08:21

## 2019-08-22 RX ADMIN — OXYCODONE HYDROCHLORIDE 10 MG: 5 TABLET ORAL at 02:25

## 2019-08-22 ASSESSMENT — ACTIVITIES OF DAILY LIVING (ADL)
ADLS_ACUITY_SCORE: 12

## 2019-08-22 ASSESSMENT — VISUAL ACUITY
OU: NORMAL ACUITY

## 2019-08-22 NOTE — PLAN OF CARE
Pt completed PLC education regarding PICC cares. Pt stable for discharge. Discharged to home via wheelchair with all of her belongings.

## 2019-08-22 NOTE — PROGRESS NOTES
Home Infusion  Zayra is expected to discharge today and will be going home on IV ancef q 8 hrs.  She has done home IV therapy with [facility] before but it has been about 5 years.   She has an appt with the PLC nurse this morning for teaching.  Met with Zayra at bedside and reviewed information about [facility] services.  Explained about administration method for her ancef which will be via IVP.    Informed her about supplies and delivery of supplies, storage of medication, dosing times, plan for SNV and 24/7 availability of I staff while on IV therapy.   [facility] will be providing home nursing and therapies but are not able to see her today so I  explained that after her PLC class I can add extension tubing to her PICC, have her flush it and verify her competence and comfort with the administration before discharging home.  Zayra verbalized understanding of all information given and is agreeable with the plan.   She is willing and able to learn and manage home IV therapy.  Questions answered.  Will revisit this afternoon as per above.    Seema BARROS  [facility] Liaison  [phone]

## 2019-08-22 NOTE — PLAN OF CARE
Cared for pt [id number]  Status: Pt POD #3 T11-12 abscess drainage.   Vitals: VSS on RA.   Neuros: AxO4, strength 5/5 throughout. Denied numbness/tingling to RLE or hip.    IV: PIV SL. DL PICC in between abx.   Resp/trach: LS diminished at the bases. CPAP at night.   Diet: Regular diet, no nausea. Tolerating well.   Bowel status: No BM since 8/19, Senna given. Pt reports passing gas, BS audible and active  : Voiding spontaneously.   Skin: Posterior back incision covered with marked drainage. Hemovac x 2 with minimal drainage.   Pain: Controlled with PRN oxycodone and Tylenol, scheduled Flexeril.    Activity: Up in room IND  Social: No visitors today  Plan: New PICC placed today. Plan to discharge tidat on IV antibiotics. Completed education on hemo-vac and DL PICC. Continue to monitor and follow POC.

## 2019-08-22 NOTE — PLAN OF CARE
Status: Pt POD #2 T11-12 abscess drainage.   Vitals: VSS on RA.   Neuros: AxO4, strength 5/5 throughout. Denied numbness/tingling to RLE.   IV: PIV SL. TL PICC HL in between abx.   Resp/trach: LS diminished at the bases. CPAP at night.   Diet: Regular diet, no nausea. Tolerating well.   Bowel status: No BM since 8/19, Senna given. Pt reports passing gas, BS audible and normoactive.   : Voiding spontaneously.   Skin: Posterior back incision covered with marked drainage. Hemovac x 2 with minimal drainage.   Pain: Controlled with PRN oxycodone and Tylenol, scheduled Flexeril.    Activity: Up in room SBA.   Social: Mother at bedside, supportive.   Plan: PICC placed today. Plan to discharge tomorrow on IV antibiotics. Continue to monitor and follow POC.

## 2019-08-22 NOTE — PLAN OF CARE
PT - 6A  Discharge Planner PT   Patient plan for discharge: home with assist  Current status: Pt performs sit<>stand with FWW and CGA-SBA. Ambulates 150ft x2 with FWW with SBA. Pt ascended/descended 3 steps using R rail with CGA using step to pattern d/t weakness.  Barriers to return to prior living situation: medical status, fatigue, pain  Recommendations for discharge: home with assist and HH PT  Rationale for recommendations: Pt able to safely enter home ascending/descending 3 stairs. She demonstrates safety with functional mobility, however, would benefit from continued skilled PT in order to progress gait and activity tolerance, and strengthening LEs to manage home/community activities.  Entered by: Rose Sarmiento 08/22/2019 11:51 AM

## 2019-08-22 NOTE — CONSULTS
I saw Zayra at bedside for PICC/IV antibiotic instruction. She currently has a triple lumen open ended PICC and I spoke with her about changing it to a double lumen power valved PICC since she was going to be on IV antibiotics for 6 weeks. Current PICC would require extensions on all 3 lumens and daily heparin flushes for each lumen. She agreed that the valved PICC would be better for her and the NP was consulted. NP wants a lumen for contrast if the need arises. Plan is to exchange her PICC today before she discharges. Home Care can flush the dormant lumen q week. Zayra did well with flushing and the IV push procedure. [facility] will provide services at home.   Literature given: Handwashing and Skin Care, Getting Ready for Your PICC, Caring for Your PICC at Home, Changing the End Cap, Flushing the Line with Heparin, Saline or Citrate, Instructions for IV Push Medicine.

## 2019-08-22 NOTE — DISCHARGE SUMMARY
[facility] Discharge Summary and Instructions    Zayra Ramirez MRN# [id number]   Age: 49 year old YOB: 1970     Date of Admission:  8/18/2019  Date of Discharge::  8/22/2019  Admitting Physician:  Yadiel Beal MD  Discharge Physician:  Yadiel Beal MD          Admission Diagnoses:   Epidural abscess [G06.2]          Discharge Diagnosis:   Epidural abscess [G06.2]          Procedures:   8/19/2019  T11-L1 laminectomy for decompression of epidural abscess.            Brief History of Illness:   The patient is a 49-year-old who has a history of hypertension, obesity, obstructive sleep apnea, and chronic back pain.  She tried a spinal cord stimulator that was implanted on 08/08/2019 and removed on 08/13/2019 due to failure to improve pain symptoms.  This was done at [facility].  Unfortunately, she presented with progressive low back pain and radiating pain into her lower extremities.  This was also accompanied by bladder disturbance and at least 2 episodes of incontinence.  An MRI was obtained given her point tenderness and pain localized over the upper lumbar region, and it showed an enhancing lesion approximately 1.5 cm thick strongly suggestive of an epidural abscess.  There was significant and severe compression of the thecal sac.  Given the severity of the radiographic findings and her clinical progression, the decision was made to proceed emergently to the OR, given the possibility of irreversible clinical decline.  Written consent was obtained for the operation after careful discussion of the risks and benefits.                 Hospital Course:   Following surgery the patient was admitted to the general Neurosurgical floor.  The patient noted immediate improvement of back pain following surgery.  Patient was mobilizing POD#1.  Was initially started on broad spectrum antibiotics until cultures resulted.  Cultures revealed growth of MSSA, Infectious Disease narrowed  antibiotics to Ancef 2g every 8 hours for a total duration of 6 weeks.  Patient will need to have weekly labs as outpatient while on medication and MRI lumbar spine prior to follow up with Infectious Disease on September 25.    Patient remained medically stable and afebrile throughout her stay.  Patient was evaluated by therapies who felt patient was safe to discharge home with HH PT.    Patient had PICC line placed prior to discharge for long term antibiotic therapy.    Prior to discharge patient was tolerating diet, voiding, passing bowel movements, and pain was adequately controlled.    Patient will be discharged home with hemovac drain. One drain was removed prior to discharge.  Patient will receive home health upon discharge.            Discharge Medications:     Current Discharge Medication List      START taking these medications    Details   acetaminophen (TYLENOL) 325 MG tablet Take 2 tablets (650 mg) by mouth every 4 hours as needed for other (multimodal surgical pain management along with NSAIDS and opioid medication as indicated based on pain control and physical function.)      ceFAZolin (ANCEF) intermittent infusion 2 g in 100mL NS (pre-mix) Inject 100 mLs (2 g) into the vein every 8 hours  Qty: 30 each, Refills: 11      oxyCODONE (ROXICODONE) 5 MG tablet Take 1-2 tablets (5-10 mg) by mouth every 3 hours as needed  Qty: 45 tablet, Refills: 0    Associated Diagnoses: Spinal epidural abscess      senna-docusate (SENOKOT-S/PERICOLACE) 8.6-50 MG tablet Take 1 tablet by mouth 2 times daily  Qty: 45 tablet, Refills: 0    Associated Diagnoses: Spinal epidural abscess      vitamin C 500 MG TABS Take 1 tablet (500 mg) by mouth 2 times daily      zinc sulfate (ZINCATE) 220 (50 Zn) MG capsule Take 1 capsule (220 mg) by mouth daily         CONTINUE these medications which have CHANGED    Details   cyclobenzaprine (FLEXERIL) 10 MG tablet Take 1 tablet (10 mg) by mouth 3 times daily  Qty: 30 tablet, Refills: 0     "Associated Diagnoses: Spinal epidural abscess         CONTINUE these medications which have NOT CHANGED    Details   albuterol (PROAIR HFA, PROVENTIL HFA, VENTOLIN HFA) 108 (90 BASE) MCG/ACT inhaler Inhale 2 puffs into the lungs every 6 hours as needed for shortness of breath / dyspnea or wheezing      ARIPiprazole (ABILIFY) 15 MG tablet Take 15 mg by mouth daily       cholecalciferol ( ULTRA STRENGTH) 2000 units CAPS TAKE ONE CAPSULE BY MOUTH DAILY IN AM      ferrous sulfate (FEROSUL) 325 (65 Fe) MG tablet Take 325 mg by mouth daily  Refills: 0      fluticasone-salmeterol (ADVAIR) 500-50 MCG/DOSE diskus inhaler Inhale 1 puff into the lungs every 12 hours      folic acid (FOLVITE) 1 MG tablet Take 1 tablet (1 mg) by mouth daily  Qty: 90 tablet, Refills: 3    Associated Diagnoses: History of seronegative inflammatory arthritis      lisinopril-hydrochlorothiazide (PRINZIDE/ZESTORETIC) 20-25 MG per tablet Take 1 tablet by mouth daily  Qty: 30 tablet, Refills: 0    Associated Diagnoses: Essential hypertension      methotrexate 2.5 MG tablet Take 9 tabs once weekly  Qty: 96 tablet, Refills: 2    Associated Diagnoses: History of seronegative inflammatory arthritis      montelukast (SINGULAIR) 10 MG tablet Take 10 mg by mouth At Bedtime      rOPINIRole (REQUIP) 2 MG tablet Take 2 mg by mouth nightly as needed       DULoxetine (CYMBALTA) 60 MG capsule Take 120 mg by mouth daily       OXYGEN-HELIUM IN 2-3L PRN during the day, 3L @ night         STOP taking these medications       methylPREDNISolone (MEDROL DOSEPAK) 4 MG tablet therapy pack Comments:   Reason for Stopping:         naproxen (NAPROSYN) 500 MG tablet Comments:   Reason for Stopping:         oxyCODONE-acetaminophen (PERCOCET) 5-325 MG tablet Comments:   Reason for Stopping:           Exam:   Physical Exam  BP (!) 148/72 (BP Location: Left arm)   Pulse 98   Temp 98.8  F (37.1  C) (Oral)   Resp 16   Ht 1.6 m (5' 3\")   Wt 113.4 kg (250 lb)   LMP  (LMP " Unknown)   SpO2 94%   BMI 44.29 kg/m    General: Appears comfortable, NAD  Wound: Incision, clean, dry, intact without strikethrough  Neurologic Exam:  - AOx3.  - Follows commands.  - Speech fluent, spontaneous. No aphasia or dysarthria.  - No gaze preference. No apparent hemineglect.  - PERRL, EOMI.  - Face symmetric with sensation intact to light touch.  - Palate elevates symmetrically, uvula midline, tongue protrudes midline.  - Trapezii and sternocleidomastoid muscles 5/5 bilaterally.  - No pronator drift.  Motor: Normal bulk/tone; no tremor, rigidity, or bradykinesia.   Right:  Deltoid 5/5, tricep 5/5, bicep 5/5, wrist flexor 5/5, wrist extensor 5/5, finger intrinsic 5/5  Left:  Deltoid 5/5, tricep 5/5, bicep 5/5, wrist flexor 5/5, wrist extensor 5/5, finger intrinsic 5/5  Right: Iliopsoas 5/5, quadricep 5/5, hamstring 5/5, tibialis anterior 5/5, gastrocnemius 5/5, EHL 5/5  Left:  Iliopsoas 5/5, quadricep 5/5, hamstring 5/5, tibialis anterior 5/5, gastrocnemius 5/5, EHL 5/5    Sensation intact in bilateral L4-S1 dermatones                   Discharge Instructions and Follow-Up:   Discharge diet: Regular   Discharge activity: You may advance activity as tolerated. No strenuous exercise or heay lifting greater than 10 lbs for 4 weeks or until seen and cleared in clinic.   Discharge follow-up: Please follow up with [facility] on Wednesday, August 28, 2019 for removal of drain    Follow-up with  Neurosurgery on 9/2 for staple removal    Follow up with Infectious Disease on 9/25 with MRI the day prior     Will need weekly labs to be faxed to Infectious Disease    Wound care: Ok to shower,however no scrubbing of the wound and no soaking of the wound, meaning no bathtubs or swimming pools. Pat dry only. Leave wound open to air.  Sutures are not absorbable and need to be removed in 2 weeks. If patient still at rehab by this time, the sutures may be removed by the rehab physician if he or she considers  that the wound has healed completely.     Please call if you have:  1. increased pain, redness, drainage, swelling at your incision  2. fevers > 101.5 F degrees  3. with any questions or concerns.  You may reach the Neurosurgery clinic at [phone] during regular work hours. ER at [phone].    and ask for the Neurosurgery Resident on call at [phone], during off hours or weekends.         Discharge Disposition:   Discharged to home        NICKO Greenwood, CNP  Department of Neurosurgery  Pager: [phone]

## 2019-08-22 NOTE — PROGRESS NOTES
"Neurosurgery Daily Progress Note      S:PICC Line Placed 8/21/2019 for 6 Week Course of IV Antibiotics. No Acute Events Overnight.     /67 (BP Location: Left arm)   Pulse 70   Temp 98.1  F (36.7  C) (Oral)   Resp 16   Ht 1.6 m (5' 3\")   Wt 113.4 kg (250 lb)   LMP  (LMP Unknown)   SpO2 98%   BMI 44.29 kg/m      Current Facility-Administered Medications   Medication     acetaminophen (TYLENOL) tablet 650 mg     albuterol (PROAIR HFA/PROVENTIL HFA/VENTOLIN HFA) 108 (90 Base) MCG/ACT inhaler 2 puff     ARIPiprazole (ABILIFY) tablet 15 mg     ceFAZolin (ANCEF) intermittent infusion 2 g in 100mL NS (pre-mix)     cyclobenzaprine (FLEXERIL) tablet 10 mg     DULoxetine (CYMBALTA) DR capsule 120 mg     fluticasone-vilanterol (BREO ELLIPTA) 200-25 MCG/INH inhaler 1 puff     heparin lock flush 10 UNIT/ML injection 2-5 mL     heparin lock flush 10 UNIT/ML injection 2-5 mL     hydrALAZINE (APRESOLINE) injection 10-20 mg     labetalol (NORMODYNE/TRANDATE) syringe 10-40 mg     lidocaine (LMX4) cream     lidocaine (LMX4) cream     lidocaine (LMX4) cream     lidocaine 1 % 0.1-1 mL     lidocaine 1 % 0.1-1 mL     lidocaine 1 % 0.1-1 mL     lisinopril-hydrochlorothiazide (PRINZIDE/ZESTORETIC) 20-25 MG per tablet 1 tablet     magnesium sulfate 2 g in NS intermittent infusion ([facility] or [facility] Cmpd)     magnesium sulfate 4 g in 100 mL sterile water (premade)     metoclopramide (REGLAN) tablet 10 mg    Or     metoclopramide (REGLAN) injection 10 mg     montelukast (SINGULAIR) tablet 10 mg     morphine (PF) injection 2 mg     naloxone (NARCAN) injection 0.1-0.4 mg     ondansetron (ZOFRAN-ODT) ODT tab 4 mg    Or     ondansetron (ZOFRAN) injection 4 mg     oxyCODONE (ROXICODONE) tablet 5-10 mg     potassium chloride (KLOR-CON) Packet 20-40 mEq     potassium chloride 10 mEq in 100 mL intermittent infusion with 10 mg lidocaine     potassium chloride 10 mEq in 100 mL sterile water intermittent infusion (premix)     potassium " chloride 20 mEq in 50 mL intermittent infusion     potassium chloride ER (K-DUR/KLOR-CON M) CR tablet 20-40 mEq     potassium phosphate 10 mmol in D5W 250 mL intermittent infusion     potassium phosphate 15 mmol in D5W 250 mL intermittent infusion     potassium phosphate 20 mmol in D5W 250 mL intermittent infusion     potassium phosphate 20 mmol in D5W 500 mL intermittent infusion     potassium phosphate 25 mmol in D5W 500 mL intermittent infusion     PRE OP antibiotics NOT needed for this surgical procedure.     prochlorperazine (COMPAZINE) injection 10 mg    Or     prochlorperazine (COMPAZINE) tablet 10 mg    Or     prochlorperazine (COMPAZINE) Suppository 25 mg     rOPINIRole (REQUIP) tablet 2 mg     senna-docusate (SENOKOT-S/PERICOLACE) 8.6-50 MG per tablet 1 tablet    Or     senna-docusate (SENOKOT-S/PERICOLACE) 8.6-50 MG per tablet 2 tablet     sodium chloride (PF) 0.9% PF flush 10 mL     sodium chloride (PF) 0.9% PF flush 10-20 mL     sodium chloride (PF) 0.9% PF flush 3 mL     sodium chloride (PF) 0.9% PF flush 3 mL     sodium chloride (PF) 0.9% PF flush 5-50 mL     sodium chloride 0.9% infusion     vitamin A 10195 units capsule 20,000 Units     vitamin C (ASCORBIC ACID) tablet 500 mg     vitamin D3 (CHOLECALCIFEROL) 1000 units (25 mcg) tablet 1,000 Units     zinc sulfate (ZINCATE) capsule 220 mg     Facility-Administered Medications Ordered in Other Encounters   Medication     Lidocaine 1 % injection 0.5-5 mL     sodium chloride (PF) 0.9% PF flush 5-50 mL       Lab Results   Component Value Date    WBC 10.6 08/22/2019     Lab Results   Component Value Date    RBC 4.03 08/22/2019     Lab Results   Component Value Date    HGB 12.6 08/22/2019     Lab Results   Component Value Date    HCT 39.4 08/22/2019     No components found for: MCT  Lab Results   Component Value Date    MCV 98 08/22/2019     Lab Results   Component Value Date    MCH 31.3 08/22/2019     Lab Results   Component Value Date    MCHC 32.0  08/22/2019     Lab Results   Component Value Date    RDW 13.3 08/22/2019     Lab Results   Component Value Date     08/22/2019     Last Comprehensive Metabolic Panel:  Sodium   Date Value Ref Range Status   08/20/2019 141 133 - 144 mmol/L Final     Potassium   Date Value Ref Range Status   08/22/2019 3.9 3.4 - 5.3 mmol/L Final     Chloride   Date Value Ref Range Status   08/20/2019 104 94 - 109 mmol/L Final     Carbon Dioxide   Date Value Ref Range Status   08/20/2019 29 20 - 32 mmol/L Final     Anion Gap   Date Value Ref Range Status   08/20/2019 8 3 - 14 mmol/L Final     Glucose   Date Value Ref Range Status   08/20/2019 94 70 - 99 mg/dL Final     Urea Nitrogen   Date Value Ref Range Status   08/20/2019 11 7 - 30 mg/dL Final     Creatinine   Date Value Ref Range Status   08/22/2019 0.69 0.52 - 1.04 mg/dL Final     GFR Estimate   Date Value Ref Range Status   08/22/2019 >90 >60 mL/min/[1.73_m2] Final     Comment:     Non  GFR Calc  Starting 12/18/2018, serum creatinine based estimated GFR (eGFR) will be   calculated using the Chronic Kidney Disease Epidemiology Collaboration   (CKD-EPI) equation.       Calcium   Date Value Ref Range Status   08/20/2019 8.6 8.5 - 10.1 mg/dL Final       O:  Exam:  General: Awake;  Alert, In No Acute Distress  Pulm: Breathing Comfortably on 2L nasal cannula   Mental status: Oriented x 4  Cranial Nerves: Cranial Nerves II-XII Intact Bilaterally  Strength:      Del Tr Bi WE WF Gr  R 5 5 5 5 5 5  L 5 5 5 5 5 5     HF KE KF DF PF   R 5 5 5 5 5   L 5 5 5 5 5     Pronator Drift: Absent  Sensory: Intact to Light Touch  Reflexes: No Hyperreflexia, Leon s or Clonus Present; Toes Down-Going Bilaterally  INCISION: covered     Assessment: Ms. Ramirez is a 49 year old female who presented with a Thoracic Epidural Abscess for which she underwent a T12 Laminectomy and Decompression. Cultures obtained intra-operatively grew MSSA. Received PICC Line 8/21/2019 for Antibiotic  Administration.     Plan:     Neuro:   Neuro Examination Q 4 HR  Plan to Remove 1 Surgical Drain Today; Will continue 2nd Surgical Drain for total of 7 days post-operatively.   Repeat MRI Lumbar Spine end of September 2019    Cardiovascular: blood pressure goals: SBP < 160; continue home Lisinopril-HCTZ    Pulmonary: continue home Ventolin, Advair, singulair; Incentive spirometry     Gastrointestinal: Regular Diet    Renal: Voiding     Heme: No Acute Issues     Endocrine: No issues     Infectious: Intra-Operative Wound Cultures Grew MSSA; On Ancef 2G Q 8 HR for duration of 6 weeks (Last day of treatment 9/30/2019). ; S/P PICC Line Placement on 8/21/2019. Weekly CMP, CBC, ESR and CRP while on Antibiotics     PT/OT: Home with Assistance    DVT prophylaxis: Sequential compression devices     DISPO:   6A; Home    Barriers: Surgical Drain     Rosalia Colorado, CARLY-BC,CNRN  Department of Neurosurgery  Pager: [phone]

## 2019-08-23 ENCOUNTER — HOME INFUSION (PRE-WILLOW HOME INFUSION) (OUTPATIENT)
Dept: PHARMACY | Facility: CLINIC | Age: 49
End: 2019-08-23

## 2019-08-23 ENCOUNTER — DOCUMENTATION ONLY (OUTPATIENT)
Dept: NEUROSURGERY | Facility: CLINIC | Age: 49
End: 2019-08-23

## 2019-08-23 ENCOUNTER — DOCUMENTATION ONLY (OUTPATIENT)
Dept: INFECTIOUS DISEASES | Facility: CLINIC | Age: 49
End: 2019-08-23

## 2019-08-23 ENCOUNTER — PATIENT OUTREACH (OUTPATIENT)
Dept: CARE COORDINATION | Facility: CLINIC | Age: 49
End: 2019-08-23

## 2019-08-23 NOTE — PLAN OF CARE
Physical Therapy Discharge Summary    Reason for therapy discharge:    Discharged to home with outpatient therapy.    Progress towards therapy goal(s). See goals on Care Plan in [facility] electronic health record for goal details.  Goals partially met.  Barriers to achieving goals:   discharge from facility.    Therapy recommendation(s):    Continued therapy is recommended.  Rationale/Recommendations:  for safe progression of functional mobility.

## 2019-08-23 NOTE — PROGRESS NOTES
Dr. Elizabeth  We have having difficulty obtaining home care orders for this patient.  Would you be willing to follow the home care orders and certification as you are the ordering provider for the IV antibiotics?     RN 2w3, 3prn  PT to eval and treat    Gauze to drain site prn.        Emergency Plan created and reviewed with patient and left in home    FACE TO FACE ENCOUNTER  The patient had a face to face encounter with an allowed provider type on MM/DD/YY and the encounter was related to the primary reason for home health care.    Patient Identified Goal//to get pain better controlled, to manage medications    SUMMARY TO MD  49 yr old female with hospital admission at [facility] 8/18/19 to 8/22/19 due to epidural abcess from previously placed pain stimulator on 8/8/19.  Pt did not have good results with stimulator therefore it was removed on 8/13/19. Pt began to develop severe lower back/extremity pain and had lost controll of her bladder requring a visit to the ER. Pt was found to have epidural abcess requring emergent surgery and T11-L1 Laminectomy. Pt was found to have abcess which cultured MSSA in back.  Pt had two drains placed, one removed prior to discharge to home. Pt was started on ancef every 8 hours x6 weeks.  Pt had valved double lumen placed piror to discharge.  Pt has done home IV abx about 5 years ago.  Pt discharged to home 8/22/19. Pts lives in [facility] style home with mulitple family members. Home is unkempt and soiled/lots of dog hair.  Pt now has double lumen picc line in upper right arm, dressing C/D/I. Pt had I visit 8/22/19 for in home same day IV teaching. Pt verbalizes understanding of IV push infusion and was independent with IV push this am. pt did report medication was harder to push that saline flush.  Pt has 12cm incision along her back that is open to air. Pt has old drain site directly above incision that is closed and dry and has hemovac drain in place lower left of the incision.  Pt  rates pain 8/10 which is much improved since surgery. 5/10 at rest and with oxycodone. Pt is taking flexeril TID. Pt has been instructed to hold her methotrexate while on IV abx.  Pt reports right thigh numbness which is new. She is walking w/o assistive device at SOC but has walker if she feels weak-however doutful she could use in the home due to clutter/narrwo hallways. Pt reports urgency with urination but no longer incontinent as before. bowel WNL, eating is fair.   Pt requires ongoing skilled nursing for IV medication management, picc line dressing cares, infection disease labs, pain mitigation, medication management, GI/ assessment, mood.  PT for HSE, HEP, balance, strengthing, endurance, adaptive eqipument education, falls risk prevention.  Pt is refusing OT/SW/HHA at [facility].  Thank you for this referral.

## 2019-08-23 NOTE — PROGRESS NOTES
This is a recent snapshot of the patient's [facility] medical record.  For current drug dose and complete information and questions, call [phone] or In Basket pool, fv home infusion ([id number])  CSN Number:  [id number]

## 2019-08-23 NOTE — PROGRESS NOTES
Dr. Beal  This patient was admitted to [facility] for wound management/drain management and IV antibiotics.  I am requesting verbal orders for the following  RN 2w3, 3prn to begin week of 8/25/19.  PT to eval and treat    Please reply back to this message with a verbal ok. Thank you    Cover drain site with gauze and secure with tape.        Emergency Plan created and reviewed with patient and left in home    FACE TO FACE ENCOUNTER  Pending    Patient Identified Goal//to get pain better controlled, to manage medications    SUMMARY TO MD  49 yr old female with hospital admission at [facility] 8/18/19 to 8/22/19 due to epidural abcess from previously placed pain stimulator on 8/8/19.  Pt did not have good results with stimulator therefore it was removed on 8/13/19. Pt began to develop severe lower back/extremity pain and had lost controll of her bladder requring a visit to the ER. Pt was found to have epidural abcess requring emergent surgery and T11-L1 Laminectomy. Pt was found to have abcess which cultured MSSA in back.  Pt had two drains placed, one removed prior to discharge to home. Pt was started on ancef every 8 hours x6 weeks.  Pt had valved double lumen placed piror to discharge.  Pt has done home IV abx about 5 years ago.  Pt discharged to home 8/22/19. Pts lives in [facility] style home with mulitple family members. Home is unkempt and soiled/lots of dog hair.  Pt now has double lumen picc line in upper right arm, dressing C/D/I. Pt had [facility] visit 8/22/19 for in home same day IV teaching. Pt verbalizes understanding of IV push infusion and was independent with IV push this am. pt did report medication was harder to push that saline flush.  Pt has 12cm incision along her back that is open to air. Pt has old drain site directly above incision that is closed and dry and has hemovac drain in place lower left of the incision.  Pt rates pain 8/10 which is much improved since surgery. 5/10 at rest and with  oxycodone. Pt is taking flexeril TID. Pt has been instructed to hold her methotrexate while on IV abx.  Pt reports right thigh numbness which is new. She is walking w/o assistive device at SOC but has walker if she feels weak-however doutful she could use in the home due to clutter/narrwo hallways. Pt reports urgency with urination but no longer incontinent as before. bowel WNL, eating is fair.   Pt requires ongoing skilled nursing for IV medication management, picc line dressing cares, infection disease labs, pain mitigation, medication management, GI/ assessment, mood.  PT for HSE, HEP, balance, strengthing, endurance, adaptive eqipument education, falls risk prevention.  Pt [place] [facility]  Thank you for this referral  Casandra Mason RN, BSN  [email or web address]  [phone]

## 2019-08-23 NOTE — PLAN OF CARE
Occupational Therapy Discharge Summary    Reason for therapy discharge:    Discharged to home.    Progress towards therapy goal(s). See goals on Care Plan in [facility] electronic health record for goal details.  Goals partially met.  Barriers to achieving goals:   discharge from facility.    Therapy recommendation(s):    Continue home exercise program.

## 2019-08-24 ENCOUNTER — HOME INFUSION (PRE-WILLOW HOME INFUSION) (OUTPATIENT)
Dept: PHARMACY | Facility: CLINIC | Age: 49
End: 2019-08-24

## 2019-08-25 LAB
BACTERIA SPEC CULT: NO GROWTH
BACTERIA SPEC CULT: NO GROWTH
Lab: NORMAL
Lab: NORMAL
SPECIMEN SOURCE: NORMAL
SPECIMEN SOURCE: NORMAL

## 2019-08-26 ENCOUNTER — MEDICAL CORRESPONDENCE (OUTPATIENT)
Dept: HEALTH INFORMATION MANAGEMENT | Facility: CLINIC | Age: 49
End: 2019-08-26

## 2019-08-26 LAB
ALBUMIN SERPL-MCNC: 2.9 G/DL (ref 3.4–5)
ALP SERPL-CCNC: 105 U/L (ref 40–150)
ALT SERPL W P-5'-P-CCNC: 11 U/L (ref 0–50)
ANION GAP SERPL CALCULATED.3IONS-SCNC: 4 MMOL/L (ref 3–14)
AST SERPL W P-5'-P-CCNC: 20 U/L (ref 0–45)
BACTERIA SPEC CULT: NORMAL
BASOPHILS # BLD AUTO: 0.1 10E9/L (ref 0–0.2)
BASOPHILS NFR BLD AUTO: 0.7 %
BILIRUB SERPL-MCNC: 0.3 MG/DL (ref 0.2–1.3)
BUN SERPL-MCNC: 8 MG/DL (ref 7–30)
CALCIUM SERPL-MCNC: 9.2 MG/DL (ref 8.5–10.1)
CHLORIDE SERPL-SCNC: 102 MMOL/L (ref 94–109)
CO2 SERPL-SCNC: 31 MMOL/L (ref 20–32)
CREAT SERPL-MCNC: 0.58 MG/DL (ref 0.52–1.04)
CRP SERPL-MCNC: 103 MG/L (ref 0–8)
DIFFERENTIAL METHOD BLD: ABNORMAL
EOSINOPHIL # BLD AUTO: 0.3 10E9/L (ref 0–0.7)
EOSINOPHIL NFR BLD AUTO: 3.1 %
ERYTHROCYTE [DISTWIDTH] IN BLOOD BY AUTOMATED COUNT: 13.2 % (ref 10–15)
ERYTHROCYTE [SEDIMENTATION RATE] IN BLOOD BY WESTERGREN METHOD: 63 MM/H (ref 0–20)
GFR SERPL CREATININE-BSD FRML MDRD: >90 ML/MIN/{1.73_M2}
GLUCOSE SERPL-MCNC: 94 MG/DL (ref 70–99)
HCT VFR BLD AUTO: 39.5 % (ref 35–47)
HGB BLD-MCNC: 13.1 G/DL (ref 11.7–15.7)
IMM GRANULOCYTES # BLD: 0.1 10E9/L (ref 0–0.4)
IMM GRANULOCYTES NFR BLD: 0.8 %
LYMPHOCYTES # BLD AUTO: 2.3 10E9/L (ref 0.8–5.3)
LYMPHOCYTES NFR BLD AUTO: 21.1 %
Lab: NORMAL
MCH RBC QN AUTO: 31.6 PG (ref 26.5–33)
MCHC RBC AUTO-ENTMCNC: 33.2 G/DL (ref 31.5–36.5)
MCV RBC AUTO: 95 FL (ref 78–100)
MONOCYTES # BLD AUTO: 1.1 10E9/L (ref 0–1.3)
MONOCYTES NFR BLD AUTO: 9.9 %
NEUTROPHILS # BLD AUTO: 6.9 10E9/L (ref 1.6–8.3)
NEUTROPHILS NFR BLD AUTO: 64.4 %
NRBC # BLD AUTO: 0 10*3/UL
NRBC BLD AUTO-RTO: 0 /100
PLATELET # BLD AUTO: 454 10E9/L (ref 150–450)
POTASSIUM SERPL-SCNC: 3.4 MMOL/L (ref 3.4–5.3)
PROT SERPL-MCNC: 7 G/DL (ref 6.8–8.8)
RBC # BLD AUTO: 4.14 10E12/L (ref 3.8–5.2)
SODIUM SERPL-SCNC: 137 MMOL/L (ref 133–144)
SPECIMEN SOURCE: NORMAL
WBC # BLD AUTO: 10.7 10E9/L (ref 4–11)

## 2019-08-26 PROCEDURE — 85025 COMPLETE CBC W/AUTO DIFF WBC: CPT | Performed by: INTERNAL MEDICINE

## 2019-08-26 PROCEDURE — 85652 RBC SED RATE AUTOMATED: CPT | Performed by: INTERNAL MEDICINE

## 2019-08-26 PROCEDURE — 86140 C-REACTIVE PROTEIN: CPT | Performed by: INTERNAL MEDICINE

## 2019-08-26 PROCEDURE — 80053 COMPREHEN METABOLIC PANEL: CPT | Performed by: INTERNAL MEDICINE

## 2019-08-26 NOTE — PROGRESS NOTES
This is a recent snapshot of the patient's [facility] medical record.  For current drug dose and complete information and questions, call [phone] or In Basket pool, fv home infusion ([id number])  CSN Number:  [id number]

## 2019-08-26 NOTE — PROGRESS NOTES
This is a recent snapshot of the patient's [facility] medical record.  For current drug dose and complete information and questions, call [phone] or In Basket pool, fv home infusion ([id number])  CSN Number:  [id number]

## 2019-08-27 ENCOUNTER — CARE COORDINATION (OUTPATIENT)
Dept: NEUROSURGERY | Facility: CLINIC | Age: 49
End: 2019-08-27

## 2019-08-27 ENCOUNTER — HOME INFUSION (PRE-WILLOW HOME INFUSION) (OUTPATIENT)
Dept: PHARMACY | Facility: CLINIC | Age: 49
End: 2019-08-27

## 2019-08-28 ENCOUNTER — OFFICE VISIT (OUTPATIENT)
Dept: NEUROSURGERY | Facility: CLINIC | Age: 49
End: 2019-08-28
Payer: COMMERCIAL

## 2019-08-28 VITALS
RESPIRATION RATE: 16 BRPM | BODY MASS INDEX: 41.2 KG/M2 | OXYGEN SATURATION: 96 % | SYSTOLIC BLOOD PRESSURE: 126 MMHG | WEIGHT: 232.5 LBS | HEIGHT: 63 IN | HEART RATE: 97 BPM | DIASTOLIC BLOOD PRESSURE: 63 MMHG

## 2019-08-28 DIAGNOSIS — Z98.890 H/O DRAINAGE OF ABSCESS: ICD-10-CM

## 2019-08-28 DIAGNOSIS — G06.1 SPINAL EPIDURAL ABSCESS: ICD-10-CM

## 2019-08-28 ASSESSMENT — PAIN SCALES - GENERAL: PAINLEVEL: NO PAIN (0)

## 2019-08-28 ASSESSMENT — MIFFLIN-ST. JEOR: SCORE: 1653.74

## 2019-08-28 NOTE — PROGRESS NOTES
This is a recent snapshot of the patient's [facility] medical record.  For current drug dose and complete information and questions, call [phone] or In Basket pool, fv home infusion ([id number])  CSN Number:  [id number]

## 2019-08-28 NOTE — LETTER
8/28/2019       RE: Zayra Ramirez  [address] [place] [address]     Dear Colleague,    Thank you for referring your patient, Zayra Ramirez, to the [facility] NEUROSURGERY at [facility]. Please see a copy of my visit note below.    NEUROSURGERY PROGRESS NOTE    REASON FOR VISIT: Routine postop wound check and Hemovac removal.    Procedure Date: 08/19/2019   PREOPERATIVE DIAGNOSIS:  Thoracolumbar epidural abscess.   PROCEDURE PERFORMED:  T11-L1 laminectomy for decompression of epidural abscess.   SURGEON: Yadiel Beal MD    HPI: This is a 49-year-old who has a history of HTN, obesity, obstructive sleep apnea, and chronic back pain.  She tried a spinal cord stimulator that was implanted on 08/08/2019 and removed on 08/13/2019 due to failure to improve pain symptoms. This was done at [facility].   She presented to [facility] ED on 8/8/2019with progressive low back pain and radiating pain into her lower extremities.  This was also accompanied by bladder disturbance and at least 2 episodes of incontinence.  An MRI was obtained given her point tenderness and pain localized over the upper lumbar region, and it showed an enhancing lesion approximately 1.5 cm thick strongly suggestive of an epidural abscess.  There was significant and severe compression of the thecal sac. Neurosurgery with Dr. eBal on call was consulted and admitted the patient. Given the severity of the radiographic findings and her clinical progression, the decision was made to proceed emergently to the OR, given the possibility of irreversible clinical decline. The procedure itself was without incident. Her culture sent from the drain brew MSSA for which ID was consulted. She was started on a 6 week course of IV Ancef 2 gm q 8 hours. A PICC line was inserted for continuous infusion at home. She had two Hemovac and one was removed during her hospitalization. She recovered well and was discharged home on POD 4  in good condition.  Since then she presents for routine wound check and Hemovac removal.    The patient reports minimum blood output daily since she was discharged. She has a total of approximately 50 ml blood accumulated in the bag over the past 10 days. She denies fever, chills, redness, swelling, and/or drainage from the thoracic lumbar incision.      CURRENT MEDICATIONS:    Current Outpatient Medications:      acetaminophen (TYLENOL) 325 MG tablet, Take 2 tablets (650 mg) by mouth every 4 hours as needed for other (multimodal surgical pain management along with NSAIDS and opioid medication as indicated based on pain control and physical function.), Disp: , Rfl:      albuterol (PROAIR HFA, PROVENTIL HFA, VENTOLIN HFA) 108 (90 BASE) MCG/ACT inhaler, Inhale 2 puffs into the lungs every 6 hours as needed for shortness of breath / dyspnea or wheezing, Disp: , Rfl:      ARIPiprazole (ABILIFY) 15 MG tablet, Take 15 mg by mouth daily , Disp: , Rfl:      ceFAZolin (ANCEF) intermittent infusion 2 g in 100mL NS (pre-mix), Inject 100 mLs (2 g) into the vein every 8 hours, Disp: 30 each, Rfl: 11     cholecalciferol ( ULTRA STRENGTH) 2000 units CAPS, TAKE ONE CAPSULE BY MOUTH DAILY IN AM, Disp: , Rfl:      cyclobenzaprine (FLEXERIL) 10 MG tablet, Take 1 tablet (10 mg) by mouth 3 times daily, Disp: 30 tablet, Rfl: 0     DULoxetine (CYMBALTA) 60 MG capsule, Take 120 mg by mouth daily , Disp: , Rfl:      ferrous sulfate (FEROSUL) 325 (65 Fe) MG tablet, Take 325 mg by mouth daily, Disp: , Rfl: 0     fluticasone-salmeterol (ADVAIR) 500-50 MCG/DOSE diskus inhaler, Inhale 1 puff into the lungs every 12 hours, Disp: , Rfl:      folic acid (FOLVITE) 1 MG tablet, Take 1 tablet (1 mg) by mouth daily, Disp: 90 tablet, Rfl: 3     lisinopril-hydrochlorothiazide (PRINZIDE/ZESTORETIC) 20-25 MG per tablet, Take 1 tablet by mouth daily, Disp: 30 tablet, Rfl: 0     methotrexate 2.5 MG tablet, Take 9 tabs once weekly, Disp: 96 tablet, Rfl:  "2     montelukast (SINGULAIR) 10 MG tablet, Take 10 mg by mouth At Bedtime, Disp: , Rfl:      oxyCODONE (ROXICODONE) 5 MG tablet, Take 1-2 tablets (5-10 mg) by mouth every 3 hours as needed, Disp: 45 tablet, Rfl: 0     OXYGEN-HELIUM IN, 2-3L PRN during the day, 3L @ night, Disp: , Rfl:      rOPINIRole (REQUIP) 2 MG tablet, Take 2 mg by mouth nightly as needed , Disp: , Rfl:      senna-docusate (SENOKOT-S/PERICOLACE) 8.6-50 MG tablet, Take 1 tablet by mouth 2 times daily, Disp: 45 tablet, Rfl: 0     vitamin C 500 MG TABS, Take 1 tablet (500 mg) by mouth 2 times daily, Disp: , Rfl:      zinc sulfate (ZINCATE) 220 (50 Zn) MG capsule, Take 1 capsule (220 mg) by mouth daily, Disp: , Rfl:   No current facility-administered medications for this visit.     Facility-Administered Medications Ordered in Other Visits:      Lidocaine 1 % injection 0.5-5 mL, 0.5-5 mL, Other, Once PRN, Gutierrez Candelario MD     sodium chloride (PF) 0.9% PF flush 5-50 mL, 5-50 mL, Intracatheter, Once PRN, Gutierrez Candelario MD    ALLERGIES:  Allergies   Allergen Reactions     Bee Venom Anaphylaxis     Bees      Doxycycline Anaphylaxis     Patient thinks it may have been just nausea and vomiting, however unable to confirm     Erythromycin Anaphylaxis and Shortness Of Breath     Other reaction(s): Vomiting     Hydrocodone-Acetaminophen Itching       PMH, SOC HIST, FAM HIST, PROBLEM LIST:  All reviewed in EPIC.    FOCUS EXAMINATION:  /63 (BP Location: Left arm, Patient Position: Sitting, Cuff Size: Adult Large)   Pulse 97   Resp 16   Ht 1.6 m (5' 3\")   Wt 105.5 kg (232 lb 8 oz)   LMP  (LMP Unknown)   SpO2 96%   BMI 41.19 kg/m     General:Well developed well nourished female in no apparent distress. She is accompanied by her father today.   LOC/Cognition: She is A&O X3.  Mood and affect WNL. Is able to sit and rise independently.     Focus neurologic exam:  Motor: 5/5 in four extremities. Sensory: Intact to light touch  Incision: Approximated by " staples. No erythema, swelling, induration, and/or drainage. The Hemovac has approximately 50 ml of blood collected since discharge 10 days ago.  I prepped the wound with ChloraPrep and cleanly removed nylon sutures and the Hemovac without difficulty. I left he staples in place until her next follow up appointment on 9/4/2019.    ASSESSMENT:  1. Spinal epidural abscess    2. H/O drainage of abscess      Zayra Ramirez is doing well. The wound is healthy without evidence of infection.    PLAN:  We discussed wound care.  *  Keep it open to air.  *  May continue to shower with mild soap/shampoo including the incision. No skin cream for 2 more weeks.  *  Call our services if you develop fever, chills, redness, swelling, and/or drainage from incision.  We discussed activities and return to work.  *  We recommend she continue the current activity restrictions until she returns for the next visit.  We discussed follow up    *  Return to clinic on 9/4/2019 for staple removal    *  Follow up with ID as directed.    All the patient's questions have been answered and they demonstrate good understanding of the above.  The patient has our contact information and is aware that she should call if she has questions or concerns.     We appreciate the opportunity to be of service in the care of this pleasant patient.  Please do call if there is anything more we can do.    This note was completed using Dragon voice recognition software.  Although reviewed after completion, some word and grammatical errors may occur.    Karine Tapia, DNP, APRN, FNP-BC  Department of Neurosurgery

## 2019-08-28 NOTE — PATIENT INSTRUCTIONS
Follow up with SUNITA Tapia on 9/4 at 1230 for suture removal    Call [phone] for concerns or questions.

## 2019-08-28 NOTE — PROGRESS NOTES
NEUROSURGERY PROGRESS NOTE    REASON FOR VISIT: Routine postop wound check and Hemovac removal.    Procedure Date: 08/19/2019   PREOPERATIVE DIAGNOSIS:  Thoracolumbar epidural abscess.   PROCEDURE PERFORMED:  T11-L1 laminectomy for decompression of epidural abscess.   SURGEON: Yadiel Beal MD    HPI: This is a 49-year-old who has a history of HTN, obesity, obstructive sleep apnea, and chronic back pain.  She tried a spinal cord stimulator that was implanted on 08/08/2019 and removed on 08/13/2019 due to failure to improve pain symptoms. This was done at [facility].  She presented to [facility] ED on 8/8/2019with progressive low back pain and radiating pain into her lower extremities.  This was also accompanied by bladder disturbance and at least 2 episodes of incontinence.  An MRI was obtained given her point tenderness and pain localized over the upper lumbar region, and it showed an enhancing lesion approximately 1.5 cm thick strongly suggestive of an epidural abscess.  There was significant and severe compression of the thecal sac. Neurosurgery with Dr. Beal on call was consulted and admitted the patient. Given the severity of the radiographic findings and her clinical progression, the decision was made to proceed emergently to the OR, given the possibility of irreversible clinical decline. The procedure itself was without incident. Her culture sent from the drain brew MSSA for which ID was consulted. She was started on a 6 week course of IV Ancef 2 gm q 8 hours. A PICC line was inserted for continuous infusion at home. She had two Hemovac and one was removed during her hospitalization. She recovered well and was discharged home on POD 4 in good condition.  Since then she presents for routine wound check and Hemovac removal.    The patient reports minimum blood output daily since she was discharged. She has a total of approximately 50 ml blood accumulated in the bag over the past 10 days. She denies fever, chills,  redness, swelling, and/or drainage from the thoracic lumbar incision.      CURRENT MEDICATIONS:    Current Outpatient Medications:      acetaminophen (TYLENOL) 325 MG tablet, Take 2 tablets (650 mg) by mouth every 4 hours as needed for other (multimodal surgical pain management along with NSAIDS and opioid medication as indicated based on pain control and physical function.), Disp: , Rfl:      albuterol (PROAIR HFA, PROVENTIL HFA, VENTOLIN HFA) 108 (90 BASE) MCG/ACT inhaler, Inhale 2 puffs into the lungs every 6 hours as needed for shortness of breath / dyspnea or wheezing, Disp: , Rfl:      ARIPiprazole (ABILIFY) 15 MG tablet, Take 15 mg by mouth daily , Disp: , Rfl:      ceFAZolin (ANCEF) intermittent infusion 2 g in 100mL NS (pre-mix), Inject 100 mLs (2 g) into the vein every 8 hours, Disp: 30 each, Rfl: 11     cholecalciferol ( ULTRA STRENGTH) 2000 units CAPS, TAKE ONE CAPSULE BY MOUTH DAILY IN AM, Disp: , Rfl:      cyclobenzaprine (FLEXERIL) 10 MG tablet, Take 1 tablet (10 mg) by mouth 3 times daily, Disp: 30 tablet, Rfl: 0     DULoxetine (CYMBALTA) 60 MG capsule, Take 120 mg by mouth daily , Disp: , Rfl:      ferrous sulfate (FEROSUL) 325 (65 Fe) MG tablet, Take 325 mg by mouth daily, Disp: , Rfl: 0     fluticasone-salmeterol (ADVAIR) 500-50 MCG/DOSE diskus inhaler, Inhale 1 puff into the lungs every 12 hours, Disp: , Rfl:      folic acid (FOLVITE) 1 MG tablet, Take 1 tablet (1 mg) by mouth daily, Disp: 90 tablet, Rfl: 3     lisinopril-hydrochlorothiazide (PRINZIDE/ZESTORETIC) 20-25 MG per tablet, Take 1 tablet by mouth daily, Disp: 30 tablet, Rfl: 0     methotrexate 2.5 MG tablet, Take 9 tabs once weekly, Disp: 96 tablet, Rfl: 2     montelukast (SINGULAIR) 10 MG tablet, Take 10 mg by mouth At Bedtime, Disp: , Rfl:      oxyCODONE (ROXICODONE) 5 MG tablet, Take 1-2 tablets (5-10 mg) by mouth every 3 hours as needed, Disp: 45 tablet, Rfl: 0     OXYGEN-HELIUM IN, 2-3L PRN during the day, 3L @ night, Disp: ,  "Rfl:      rOPINIRole (REQUIP) 2 MG tablet, Take 2 mg by mouth nightly as needed , Disp: , Rfl:      senna-docusate (SENOKOT-S/PERICOLACE) 8.6-50 MG tablet, Take 1 tablet by mouth 2 times daily, Disp: 45 tablet, Rfl: 0     vitamin C 500 MG TABS, Take 1 tablet (500 mg) by mouth 2 times daily, Disp: , Rfl:      zinc sulfate (ZINCATE) 220 (50 Zn) MG capsule, Take 1 capsule (220 mg) by mouth daily, Disp: , Rfl:   No current facility-administered medications for this visit.     Facility-Administered Medications Ordered in Other Visits:      Lidocaine 1 % injection 0.5-5 mL, 0.5-5 mL, Other, Once PRN, Gutierrez Candelario MD     sodium chloride (PF) 0.9% PF flush 5-50 mL, 5-50 mL, Intracatheter, Once PRN, Gutierrez Candelario MD      ALLERGIES:  Allergies   Allergen Reactions     Bee Venom Anaphylaxis     Bees      Doxycycline Anaphylaxis     Patient thinks it may have been just nausea and vomiting, however unable to confirm     Erythromycin Anaphylaxis and Shortness Of Breath     Other reaction(s): Vomiting     Hydrocodone-Acetaminophen Itching       PMH, SOC HIST, FAM HIST, PROBLEM LIST:  All reviewed in EPIC.      FOCUS EXAMINATION:  /63 (BP Location: Left arm, Patient Position: Sitting, Cuff Size: Adult Large)   Pulse 97   Resp 16   Ht 1.6 m (5' 3\")   Wt 105.5 kg (232 lb 8 oz)   LMP  (LMP Unknown)   SpO2 96%   BMI 41.19 kg/m    General:Well developed well nourished female in no apparent distress. She is accompanied by her father today.   LOC/Cognition: She is A&O X3.  Mood and affect WNL. Is able to sit and rise independently.     Focus neurologic exam:  Motor: 5/5 in four extremities. Sensory: Intact to light touch  Incision: Approximated by staples. No erythema, swelling, induration, and/or drainage. The Hemovac has approximately 50 ml of blood collected since discharge 10 days ago.  I prepped the wound with ChloraPrep and cleanly removed the Hemovac without difficulty. I left he staples in place until her next " follow up appointment on 9/4/2019.      ASSESSMENT:  1. Spinal epidural abscess    2. H/O drainage of abscess      Zayra Ramirez is doing well. The wound is healthy without evidence of infection.      PLAN:  We discussed wound care.  *  Keep it open to air.  *  May continue to shower with mild soap/shampoo including the incision. No skin cream for 2 more weeks.  *  Call our services if you develop fever, chills, redness, swelling, and/or drainage from incision.  We discussed activities and return to work.  *  We recommend she continue the current activity restrictions until she returns for the next visit.  We discussed follow up    *  Return to clinic on 9/4/2019 for staple removal    *  Follow up with ID as directed.    All the patient's questions have been answered and they demonstrate good understanding of the above.  The patient has our contact information and is aware that she should call if she has questions or concerns.     We appreciate the opportunity to be of service in the care of this pleasant patient.  Please do call if there is anything more we can do.    This note was completed using Dragon voice recognition software.  Although reviewed after completion, some word and grammatical errors may occur.      Karine Tapia, LAUREN, APRN, FNP-BC  Department of Neurosurgery

## 2019-08-29 ENCOUNTER — DOCUMENTATION ONLY (OUTPATIENT)
Dept: INFECTIOUS DISEASES | Facility: CLINIC | Age: 49
End: 2019-08-29

## 2019-08-29 ENCOUNTER — HOME INFUSION (PRE-WILLOW HOME INFUSION) (OUTPATIENT)
Dept: PHARMACY | Facility: CLINIC | Age: 49
End: 2019-08-29

## 2019-08-29 NOTE — PROGRESS NOTES
Miscellaneous note:    Patient is scheduled to be seen by me in the infectious diseases clinic ([facility]) on 9/25/19 at 11:30 am. She will need a repeat MRI spine right before the appointment.

## 2019-08-31 ENCOUNTER — DOCUMENTATION ONLY (OUTPATIENT)
Dept: INFECTIOUS DISEASES | Facility: CLINIC | Age: 49
End: 2019-08-31

## 2019-08-31 NOTE — PROGRESS NOTES
[facility] labs:    - WBC: 10.7; H/H: 13.1/39.5; Plat: 454  - Na: 137; K: 3.4; BUN: 8; Cr: 0.58  - ESR: 63 (stable from 8/20)  - CRP: 103 (trending down)  - LFTs within normal limits

## 2019-09-03 ENCOUNTER — HOME INFUSION (PRE-WILLOW HOME INFUSION) (OUTPATIENT)
Dept: PHARMACY | Facility: CLINIC | Age: 49
End: 2019-09-03

## 2019-09-03 ENCOUNTER — MEDICAL CORRESPONDENCE (OUTPATIENT)
Dept: HEALTH INFORMATION MANAGEMENT | Facility: CLINIC | Age: 49
End: 2019-09-03

## 2019-09-03 LAB
ALBUMIN SERPL-MCNC: 3.2 G/DL (ref 3.4–5)
ALP SERPL-CCNC: 86 U/L (ref 40–150)
ALT SERPL W P-5'-P-CCNC: 9 U/L (ref 0–50)
ANION GAP SERPL CALCULATED.3IONS-SCNC: 4 MMOL/L (ref 3–14)
AST SERPL W P-5'-P-CCNC: 26 U/L (ref 0–45)
BASOPHILS # BLD AUTO: 0.1 10E9/L (ref 0–0.2)
BASOPHILS NFR BLD AUTO: 0.9 %
BILIRUB SERPL-MCNC: 0.2 MG/DL (ref 0.2–1.3)
BUN SERPL-MCNC: 8 MG/DL (ref 7–30)
CALCIUM SERPL-MCNC: 9 MG/DL (ref 8.5–10.1)
CHLORIDE SERPL-SCNC: 101 MMOL/L (ref 94–109)
CO2 SERPL-SCNC: 32 MMOL/L (ref 20–32)
CREAT SERPL-MCNC: 0.68 MG/DL (ref 0.52–1.04)
CRP SERPL-MCNC: 11.4 MG/L (ref 0–8)
DIFFERENTIAL METHOD BLD: NORMAL
EOSINOPHIL # BLD AUTO: 0.2 10E9/L (ref 0–0.7)
EOSINOPHIL NFR BLD AUTO: 2.8 %
ERYTHROCYTE [DISTWIDTH] IN BLOOD BY AUTOMATED COUNT: 13.2 % (ref 10–15)
ERYTHROCYTE [SEDIMENTATION RATE] IN BLOOD BY WESTERGREN METHOD: 63 MM/H (ref 0–20)
GFR SERPL CREATININE-BSD FRML MDRD: >90 ML/MIN/{1.73_M2}
GLUCOSE SERPL-MCNC: 78 MG/DL (ref 70–99)
HCT VFR BLD AUTO: 36.2 % (ref 35–47)
HGB BLD-MCNC: 12.3 G/DL (ref 11.7–15.7)
IMM GRANULOCYTES # BLD: 0 10E9/L (ref 0–0.4)
IMM GRANULOCYTES NFR BLD: 0.5 %
LYMPHOCYTES # BLD AUTO: 2.1 10E9/L (ref 0.8–5.3)
LYMPHOCYTES NFR BLD AUTO: 27.5 %
MCH RBC QN AUTO: 32.4 PG (ref 26.5–33)
MCHC RBC AUTO-ENTMCNC: 34 G/DL (ref 31.5–36.5)
MCV RBC AUTO: 95 FL (ref 78–100)
MONOCYTES # BLD AUTO: 0.6 10E9/L (ref 0–1.3)
MONOCYTES NFR BLD AUTO: 7.8 %
NEUTROPHILS # BLD AUTO: 4.7 10E9/L (ref 1.6–8.3)
NEUTROPHILS NFR BLD AUTO: 60.5 %
NRBC # BLD AUTO: 0 10*3/UL
NRBC BLD AUTO-RTO: 0 /100
PLATELET # BLD AUTO: 400 10E9/L (ref 150–450)
POTASSIUM SERPL-SCNC: 3.5 MMOL/L (ref 3.4–5.3)
PROT SERPL-MCNC: 6.7 G/DL (ref 6.8–8.8)
RBC # BLD AUTO: 3.8 10E12/L (ref 3.8–5.2)
SODIUM SERPL-SCNC: 137 MMOL/L (ref 133–144)
WBC # BLD AUTO: 7.7 10E9/L (ref 4–11)

## 2019-09-03 PROCEDURE — 85652 RBC SED RATE AUTOMATED: CPT | Performed by: INTERNAL MEDICINE

## 2019-09-03 PROCEDURE — 80053 COMPREHEN METABOLIC PANEL: CPT | Performed by: INTERNAL MEDICINE

## 2019-09-03 PROCEDURE — 85025 COMPLETE CBC W/AUTO DIFF WBC: CPT | Performed by: INTERNAL MEDICINE

## 2019-09-03 PROCEDURE — 86140 C-REACTIVE PROTEIN: CPT | Performed by: INTERNAL MEDICINE

## 2019-09-03 NOTE — PROGRESS NOTES
This is a recent snapshot of the patient's [facility] medical record.  For current drug dose and complete information and questions, call [phone] or In Basket pool, fv home infusion ([id number])  CSN Number:  [id number]

## 2019-09-04 ENCOUNTER — OFFICE VISIT (OUTPATIENT)
Dept: NEUROSURGERY | Facility: CLINIC | Age: 49
End: 2019-09-04
Payer: COMMERCIAL

## 2019-09-04 ENCOUNTER — HOME INFUSION (PRE-WILLOW HOME INFUSION) (OUTPATIENT)
Dept: PHARMACY | Facility: CLINIC | Age: 49
End: 2019-09-04

## 2019-09-04 VITALS
DIASTOLIC BLOOD PRESSURE: 86 MMHG | SYSTOLIC BLOOD PRESSURE: 121 MMHG | OXYGEN SATURATION: 97 % | RESPIRATION RATE: 16 BRPM | HEART RATE: 78 BPM

## 2019-09-04 DIAGNOSIS — Z48.02 VISIT FOR SUTURE REMOVAL: ICD-10-CM

## 2019-09-04 DIAGNOSIS — G06.1 SPINAL EPIDURAL ABSCESS: ICD-10-CM

## 2019-09-04 DIAGNOSIS — Z98.890 H/O DRAINAGE OF ABSCESS: ICD-10-CM

## 2019-09-04 ASSESSMENT — PAIN SCALES - GENERAL: PAINLEVEL: NO PAIN (0)

## 2019-09-04 NOTE — PROGRESS NOTES
This is a recent snapshot of the patient's [facility] medical record.  For current drug dose and complete information and questions, call [phone] or In Basket pool, fv home infusion ([id number])  CSN Number:  [id number]

## 2019-09-04 NOTE — PROGRESS NOTES
NEUROSURGERY PROGRESS NOTE    REASON FOR VISIT: Routine postop suture removal.      Procedure Date: 08/19/2019   PREOPERATIVE DIAGNOSIS:  Thoracolumbar epidural abscess.   PROCEDURE PERFORMED:  T11-L1 laminectomy for decompression of epidural abscess.   SURGEON: Yadiel Beal MD      HPI: This is a 49-year-old who has a history of HTN, obesity, obstructive sleep apnea, and chronic back pain.  She tried a spinal cord stimulator that was implanted on 08/08/2019 and removed on 08/13/2019 due to failure to improve pain symptoms. This was done at [facility].  She presented to [facility] ED on 8/8/2019with progressive low back pain and radiating pain into her lower extremities.  This was also accompanied by bladder disturbance and at least 2 episodes of incontinence.  An MRI was obtained given her point tenderness and pain localized over the upper lumbar region, and it showed an enhancing lesion approximately 1.5 cm thick strongly suggestive of an epidural abscess.  There was significant and severe compression of the thecal sac. Neurosurgery with Dr. Beal on call was consulted and admitted the patient. Given the severity of the radiographic findings and her clinical progression, the decision was made to proceed emergently to the OR, given the possibility of irreversible clinical decline. The procedure itself was without incident. Her culture sent from the drain brew MSSA for which ID was consulted. She was started on a 6 week course of IV Ancef 2 gm q 8 hours. A PICC line was inserted for continuous infusion at home. She had two Hemovac and one was removed during her hospitalization. She recovered well and was discharged home on POD 4 in good condition.  Since then she presents for routine suture removal.    The patient denies fever, chills, redness, swelling, and/or drainage from incision. She has no questions or concerns today.      CURRENT MEDICATIONS:    Current Outpatient Medications:      acetaminophen (TYLENOL) 325 MG  tablet, Take 2 tablets (650 mg) by mouth every 4 hours as needed for other (multimodal surgical pain management along with NSAIDS and opioid medication as indicated based on pain control and physical function.), Disp: , Rfl:      albuterol (PROAIR HFA, PROVENTIL HFA, VENTOLIN HFA) 108 (90 BASE) MCG/ACT inhaler, Inhale 2 puffs into the lungs every 6 hours as needed for shortness of breath / dyspnea or wheezing, Disp: , Rfl:      ARIPiprazole (ABILIFY) 15 MG tablet, Take 15 mg by mouth daily , Disp: , Rfl:      ceFAZolin (ANCEF) intermittent infusion 2 g in 100mL NS (pre-mix), Inject 100 mLs (2 g) into the vein every 8 hours, Disp: 30 each, Rfl: 11     cholecalciferol ( ULTRA STRENGTH) 2000 units CAPS, TAKE ONE CAPSULE BY MOUTH DAILY IN AM, Disp: , Rfl:      cyclobenzaprine (FLEXERIL) 10 MG tablet, Take 1 tablet (10 mg) by mouth 3 times daily, Disp: 30 tablet, Rfl: 0     DULoxetine (CYMBALTA) 60 MG capsule, Take 120 mg by mouth daily , Disp: , Rfl:      ferrous sulfate (FEROSUL) 325 (65 Fe) MG tablet, Take 325 mg by mouth daily, Disp: , Rfl: 0     fluticasone-salmeterol (ADVAIR) 500-50 MCG/DOSE diskus inhaler, Inhale 1 puff into the lungs every 12 hours, Disp: , Rfl:      folic acid (FOLVITE) 1 MG tablet, Take 1 tablet (1 mg) by mouth daily, Disp: 90 tablet, Rfl: 3     lisinopril-hydrochlorothiazide (PRINZIDE/ZESTORETIC) 20-25 MG per tablet, Take 1 tablet by mouth daily, Disp: 30 tablet, Rfl: 0     methotrexate 2.5 MG tablet, Take 9 tabs once weekly, Disp: 96 tablet, Rfl: 2     montelukast (SINGULAIR) 10 MG tablet, Take 10 mg by mouth At Bedtime, Disp: , Rfl:      OXYGEN-HELIUM IN, 2-3L PRN during the day, 3L @ night, Disp: , Rfl:      rOPINIRole (REQUIP) 2 MG tablet, Take 2 mg by mouth nightly as needed , Disp: , Rfl:      vitamin C 500 MG TABS, Take 1 tablet (500 mg) by mouth 2 times daily, Disp: , Rfl:      zinc sulfate (ZINCATE) 220 (50 Zn) MG capsule, Take 1 capsule (220 mg) by mouth daily, Disp: , Rfl:       oxyCODONE (ROXICODONE) 5 MG tablet, Take 1-2 tablets (5-10 mg) by mouth every 3 hours as needed (Patient not taking: Reported on 9/4/2019), Disp: 45 tablet, Rfl: 0     senna-docusate (SENOKOT-S/PERICOLACE) 8.6-50 MG tablet, Take 1 tablet by mouth 2 times daily (Patient not taking: Reported on 9/4/2019), Disp: 45 tablet, Rfl: 0  No current facility-administered medications for this visit.     Facility-Administered Medications Ordered in Other Visits:      Lidocaine 1 % injection 0.5-5 mL, 0.5-5 mL, Other, Once PRN, Gutierrez Candelario MD     sodium chloride (PF) 0.9% PF flush 5-50 mL, 5-50 mL, Intracatheter, Once PRN, Gutierrez Candelario MD      ALLERGIES:  Allergies   Allergen Reactions     Bee Venom Anaphylaxis     Bees      Doxycycline Anaphylaxis     Patient thinks it may have been just nausea and vomiting, however unable to confirm     Erythromycin Anaphylaxis and Shortness Of Breath     Other reaction(s): Vomiting     Hydrocodone-Acetaminophen Itching       PMH, SOC HIST, FAM HIST, PROBLEM LIST:  All reviewed in EPIC.      FOCUS EXAMINATION:  /86 (BP Location: Left arm, Patient Position: Chair, Cuff Size: Adult Regular)   Pulse 78   Resp 16   LMP  (LMP Unknown)   SpO2 97%   General:Well developed well nourished female in no apparent distress. She is accompanied by her father today.   LOC/Cognition: She is A&O X3.  Mood and affect WNL. Is able to sit and rise independently.     Focus neurologic exam:  Motor: 5/5 in four extremities. Sensory: Intact to light touch  Incision: Approximated by staples. No erythema, swelling, induration, and/or drainage. The Hemovac has approximately 50 ml of blood collected since discharge 10 days ago.  I prepped the wound with ChloraPrep and cleanly removed staples without difficulty.       ASSESSMENT:  No diagnosis found.  Zayra Ramirez is doing well. The wound is healthy without evidence of infection.      PLAN:  We discussed wound care.  *  Keep it open to air.  *  May  resume showering with mild soap/shampoo including the incision. No skin cream for 2 more weeks.  *  Call our services if you develop fever, chills, redness, swelling, and/or drainage from incision.  We discussed activities and return to work.  *  We recommend she continue the current activity restrictions until she returns for the next visit.  We discussed follow up    *  Follow up with ID as directed.  *  PRN follow up with Neuorsurgery    All the patient's questions have been answered and they demonstrate good understanding of the above.  The patient has our contact information and is aware that she should call if she has questions or concerns.     We appreciate the opportunity to be of service in the care of this pleasant patient.  Please do call if there is anything more we can do.    This note was completed using Dragon voice recognition software.  Although reviewed after completion, some word and grammatical errors may occur.      Karine Tapia, LAUREN, APRN, FNP-BC  Department of Neurosurgery

## 2019-09-04 NOTE — NURSING NOTE
Chief Complaint   Patient presents with     RECHECK     ump return suture removal     Norma Farfan CMA

## 2019-09-04 NOTE — LETTER
9/4/2019       RE: Zayra Ramirez  [address] [place] [address]     Dear Colleague,    Thank you for referring your patient, Zayra Ramirez, to the [facility] NEUROSURGERY at [facility]. Please see a copy of my visit note below.    NEUROSURGERY PROGRESS NOTE    REASON FOR VISIT: Routine postop suture removal.    Procedure Date: 08/19/2019   PREOPERATIVE DIAGNOSIS:  Thoracolumbar epidural abscess.   PROCEDURE PERFORMED:  T11-L1 laminectomy for decompression of epidural abscess.   SURGEON: Yadiel Beal MD    HPI: This is a 49-year-old who has a history of HTN, obesity, obstructive sleep apnea, and chronic back pain.  She tried a spinal cord stimulator that was implanted on 08/08/2019 and removed on 08/13/2019 due to failure to improve pain symptoms. This was done at [facility].  She presented to [facility] ED on 8/8/2019with progressive low back pain and radiating pain into her lower extremities.  This was also accompanied by bladder disturbance and at least 2 episodes of incontinence.  An MRI was obtained given her point tenderness and pain localized over the upper lumbar region, and it showed an enhancing lesion approximately 1.5 cm thick strongly suggestive of an epidural abscess.  There was significant and severe compression of the thecal sac. Neurosurgery with Dr. Beal on call was consulted and admitted the patient. Given the severity of the radiographic findings and her clinical progression, the decision was made to proceed emergently to the OR, given the possibility of irreversible clinical decline. The procedure itself was without incident. Her culture sent from the drain brew MSSA for which ID was consulted. She was started on a 6 week course of IV Ancef 2 gm q 8 hours. A PICC line was inserted for continuous infusion at home. She had two Hemovac and one was removed during her hospitalization. She recovered well and was discharged home on POD 4 in good condition.   Since then she presents for routine suture removal.    The patient denies fever, chills, redness, swelling, and/or drainage from incision. She has no questions or concerns today.      CURRENT MEDICATIONS:    Current Outpatient Medications:      acetaminophen (TYLENOL) 325 MG tablet, Take 2 tablets (650 mg) by mouth every 4 hours as needed for other (multimodal surgical pain management along with NSAIDS and opioid medication as indicated based on pain control and physical function.), Disp: , Rfl:      albuterol (PROAIR HFA, PROVENTIL HFA, VENTOLIN HFA) 108 (90 BASE) MCG/ACT inhaler, Inhale 2 puffs into the lungs every 6 hours as needed for shortness of breath / dyspnea or wheezing, Disp: , Rfl:      ARIPiprazole (ABILIFY) 15 MG tablet, Take 15 mg by mouth daily , Disp: , Rfl:      ceFAZolin (ANCEF) intermittent infusion 2 g in 100mL NS (pre-mix), Inject 100 mLs (2 g) into the vein every 8 hours, Disp: 30 each, Rfl: 11     cholecalciferol ( ULTRA STRENGTH) 2000 units CAPS, TAKE ONE CAPSULE BY MOUTH DAILY IN AM, Disp: , Rfl:      cyclobenzaprine (FLEXERIL) 10 MG tablet, Take 1 tablet (10 mg) by mouth 3 times daily, Disp: 30 tablet, Rfl: 0     DULoxetine (CYMBALTA) 60 MG capsule, Take 120 mg by mouth daily , Disp: , Rfl:      ferrous sulfate (FEROSUL) 325 (65 Fe) MG tablet, Take 325 mg by mouth daily, Disp: , Rfl: 0     fluticasone-salmeterol (ADVAIR) 500-50 MCG/DOSE diskus inhaler, Inhale 1 puff into the lungs every 12 hours, Disp: , Rfl:      folic acid (FOLVITE) 1 MG tablet, Take 1 tablet (1 mg) by mouth daily, Disp: 90 tablet, Rfl: 3     lisinopril-hydrochlorothiazide (PRINZIDE/ZESTORETIC) 20-25 MG per tablet, Take 1 tablet by mouth daily, Disp: 30 tablet, Rfl: 0     methotrexate 2.5 MG tablet, Take 9 tabs once weekly, Disp: 96 tablet, Rfl: 2     montelukast (SINGULAIR) 10 MG tablet, Take 10 mg by mouth At Bedtime, Disp: , Rfl:      OXYGEN-HELIUM IN, 2-3L PRN during the day, 3L @ night, Disp: , Rfl:       rOPINIRole (REQUIP) 2 MG tablet, Take 2 mg by mouth nightly as needed , Disp: , Rfl:      vitamin C 500 MG TABS, Take 1 tablet (500 mg) by mouth 2 times daily, Disp: , Rfl:      zinc sulfate (ZINCATE) 220 (50 Zn) MG capsule, Take 1 capsule (220 mg) by mouth daily, Disp: , Rfl:      oxyCODONE (ROXICODONE) 5 MG tablet, Take 1-2 tablets (5-10 mg) by mouth every 3 hours as needed (Patient not taking: Reported on 9/4/2019), Disp: 45 tablet, Rfl: 0     senna-docusate (SENOKOT-S/PERICOLACE) 8.6-50 MG tablet, Take 1 tablet by mouth 2 times daily (Patient not taking: Reported on 9/4/2019), Disp: 45 tablet, Rfl: 0  No current facility-administered medications for this visit.     Facility-Administered Medications Ordered in Other Visits:      Lidocaine 1 % injection 0.5-5 mL, 0.5-5 mL, Other, Once PRN, Gutierrez Candelario MD     sodium chloride (PF) 0.9% PF flush 5-50 mL, 5-50 mL, Intracatheter, Once PRN, Gutierrez Candelario MD    ALLERGIES:  Allergies   Allergen Reactions     Bee Venom Anaphylaxis     Bees      Doxycycline Anaphylaxis     Patient thinks it may have been just nausea and vomiting, however unable to confirm     Erythromycin Anaphylaxis and Shortness Of Breath     Other reaction(s): Vomiting     Hydrocodone-Acetaminophen Itching       PMH, SOC HIST, FAM HIST, PROBLEM LIST:  All reviewed in EPIC.      FOCUS EXAMINATION:  /86 (BP Location: Left arm, Patient Position: Chair, Cuff Size: Adult Regular)   Pulse 78   Resp 16   LMP  (LMP Unknown)   SpO2 97%   General:Well developed well nourished female in no apparent distress. She is accompanied by her father today.   LOC/Cognition: She is A&O X3.  Mood and affect WNL. Is able to sit and rise independently.     Focus neurologic exam:  Motor: 5/5 in four extremities. Sensory: Intact to light touch  Incision: Approximated by staples. No erythema, swelling, induration, and/or drainage. The Hemovac has approximately 50 ml of blood collected since discharge 10 days ago.  I  prepped the wound with ChloraPrep and cleanly removed staples without difficulty.     ASSESSMENT:  No diagnosis found.  Zayra Ramirez is doing well. The wound is healthy without evidence of infection.    PLAN:  We discussed wound care.  *  Keep it open to air.  *  May resume showering with mild soap/shampoo including the incision. No skin cream for 2 more weeks.  *  Call our services if you develop fever, chills, redness, swelling, and/or drainage from incision.  We discussed activities and return to work.  *  We recommend she continue the current activity restrictions until she returns for the next visit.  We discussed follow up    *  Follow up with ID as directed.  *  PRN follow up with Neuorsurgery    All the patient's questions have been answered and they demonstrate good understanding of the above.  The patient has our contact information and is aware that she should call if she has questions or concerns.     We appreciate the opportunity to be of service in the care of this pleasant patient.  Please do call if there is anything more we can do.    This note was completed using Dragon voice recognition software.  Although reviewed after completion, some word and grammatical errors may occur.    Karine Tapia, LAUREN, APRN, FNP-BC  Department of Neurosurgery

## 2019-09-05 ENCOUNTER — TELEPHONE (OUTPATIENT)
Dept: NEUROSURGERY | Facility: CLINIC | Age: 49
End: 2019-09-05

## 2019-09-05 NOTE — PROGRESS NOTES
This is a recent snapshot of the patient's [facility] medical record.  For current drug dose and complete information and questions, call [phone] or In Basket pool, [facility] home infusion ([id number])  CSN Number:  [id number]

## 2019-09-05 NOTE — TELEPHONE ENCOUNTER
----- Message from NICKO Chavarria CNP sent at 9/4/2019  1:37 PM CDT -----  Regarding: RE: OV Plan  She had a SPS inserted and removed elsewhere for pain. It got infected and developed an abscess in the spine for which Dr. Beal did an I/D while on call. ID is treating her with Abx.    We did not see her or sent for a SPS. She needs to go back to her PCP for referral to chronic pain mgt/injections.                    ----- Message -----  From: Holly Smith RN  Sent: 9/4/2019   1:05 PM  To: NICKO Chavarria CNP  Subject: OV Plan                                          Patient asking for pain management and back injections?    OV today 9/4/19    Thanks  Holly

## 2019-09-08 ENCOUNTER — DOCUMENTATION ONLY (OUTPATIENT)
Dept: INFECTIOUS DISEASES | Facility: CLINIC | Age: 49
End: 2019-09-08

## 2019-09-08 NOTE — PROGRESS NOTES
OPAT labs (9/3):    WBC: 7.7; H/H: 12.3/36.2; Plat: 400  Na: 137; K: 3.5; Gluc: 78; BUN: 8; Cr: 0.68  LFTs within normal limits  ESR: 63 (stable from 8/26)  CRP: 11.4 (down from 103 on 8/26)

## 2019-09-09 ENCOUNTER — HOME INFUSION (PRE-WILLOW HOME INFUSION) (OUTPATIENT)
Dept: PHARMACY | Facility: CLINIC | Age: 49
End: 2019-09-09

## 2019-09-09 LAB
ALBUMIN SERPL-MCNC: 3.5 G/DL (ref 3.4–5)
ALP SERPL-CCNC: 95 U/L (ref 40–150)
ALT SERPL W P-5'-P-CCNC: 12 U/L (ref 0–50)
ANION GAP SERPL CALCULATED.3IONS-SCNC: 5 MMOL/L (ref 3–14)
AST SERPL W P-5'-P-CCNC: 20 U/L (ref 0–45)
BASOPHILS # BLD AUTO: 0.1 10E9/L (ref 0–0.2)
BASOPHILS NFR BLD AUTO: 0.8 %
BILIRUB SERPL-MCNC: 0.1 MG/DL (ref 0.2–1.3)
BUN SERPL-MCNC: 10 MG/DL (ref 7–30)
CALCIUM SERPL-MCNC: 9 MG/DL (ref 8.5–10.1)
CHLORIDE SERPL-SCNC: 105 MMOL/L (ref 94–109)
CO2 SERPL-SCNC: 30 MMOL/L (ref 20–32)
CREAT SERPL-MCNC: 0.71 MG/DL (ref 0.52–1.04)
CRP SERPL-MCNC: 13.4 MG/L (ref 0–8)
DIFFERENTIAL METHOD BLD: NORMAL
EOSINOPHIL # BLD AUTO: 0.3 10E9/L (ref 0–0.7)
EOSINOPHIL NFR BLD AUTO: 3.9 %
ERYTHROCYTE [DISTWIDTH] IN BLOOD BY AUTOMATED COUNT: 13.2 % (ref 10–15)
ERYTHROCYTE [SEDIMENTATION RATE] IN BLOOD BY WESTERGREN METHOD: 25 MM/H (ref 0–20)
GFR SERPL CREATININE-BSD FRML MDRD: >90 ML/MIN/{1.73_M2}
GLUCOSE SERPL-MCNC: 86 MG/DL (ref 70–99)
HCT VFR BLD AUTO: 38.9 % (ref 35–47)
HGB BLD-MCNC: 12.6 G/DL (ref 11.7–15.7)
IMM GRANULOCYTES # BLD: 0 10E9/L (ref 0–0.4)
IMM GRANULOCYTES NFR BLD: 0.5 %
LYMPHOCYTES # BLD AUTO: 1.9 10E9/L (ref 0.8–5.3)
LYMPHOCYTES NFR BLD AUTO: 25.6 %
MCH RBC QN AUTO: 31.6 PG (ref 26.5–33)
MCHC RBC AUTO-ENTMCNC: 32.4 G/DL (ref 31.5–36.5)
MCV RBC AUTO: 98 FL (ref 78–100)
MONOCYTES # BLD AUTO: 0.6 10E9/L (ref 0–1.3)
MONOCYTES NFR BLD AUTO: 8.3 %
NEUTROPHILS # BLD AUTO: 4.5 10E9/L (ref 1.6–8.3)
NEUTROPHILS NFR BLD AUTO: 60.9 %
NRBC # BLD AUTO: 0 10*3/UL
NRBC BLD AUTO-RTO: 0 /100
PLATELET # BLD AUTO: 315 10E9/L (ref 150–450)
POTASSIUM SERPL-SCNC: 3.9 MMOL/L (ref 3.4–5.3)
PROT SERPL-MCNC: 6.9 G/DL (ref 6.8–8.8)
RBC # BLD AUTO: 3.99 10E12/L (ref 3.8–5.2)
SODIUM SERPL-SCNC: 140 MMOL/L (ref 133–144)
WBC # BLD AUTO: 7.4 10E9/L (ref 4–11)

## 2019-09-09 PROCEDURE — 85652 RBC SED RATE AUTOMATED: CPT

## 2019-09-09 PROCEDURE — 85025 COMPLETE CBC W/AUTO DIFF WBC: CPT

## 2019-09-09 PROCEDURE — 86140 C-REACTIVE PROTEIN: CPT

## 2019-09-09 PROCEDURE — 80053 COMPREHEN METABOLIC PANEL: CPT

## 2019-09-10 NOTE — PROGRESS NOTES
This is a recent snapshot of the patient's [facility] medical record.  For current drug dose and complete information and questions, call [phone] or In Basket pool, fv home infusion ([id number])  CSN Number:  [id number]

## 2019-09-12 ENCOUNTER — OFFICE VISIT (OUTPATIENT)
Dept: INTERNAL MEDICINE | Facility: CLINIC | Age: 49
End: 2019-09-12
Payer: COMMERCIAL

## 2019-09-12 VITALS
BODY MASS INDEX: 41.98 KG/M2 | OXYGEN SATURATION: 95 % | WEIGHT: 237 LBS | HEART RATE: 73 BPM | SYSTOLIC BLOOD PRESSURE: 116 MMHG | DIASTOLIC BLOOD PRESSURE: 81 MMHG

## 2019-09-12 DIAGNOSIS — G06.1 SPINAL EPIDURAL ABSCESS: Primary | ICD-10-CM

## 2019-09-12 DIAGNOSIS — M54.5 CHRONIC MIDLINE LOW BACK PAIN, WITH SCIATICA PRESENCE UNSPECIFIED: ICD-10-CM

## 2019-09-12 DIAGNOSIS — F40.240 CLAUSTROPHOBIA: ICD-10-CM

## 2019-09-12 DIAGNOSIS — G89.29 CHRONIC MIDLINE LOW BACK PAIN, WITH SCIATICA PRESENCE UNSPECIFIED: ICD-10-CM

## 2019-09-12 DIAGNOSIS — Z76.89 ENCOUNTER TO ESTABLISH CARE WITH NEW DOCTOR: Primary | ICD-10-CM

## 2019-09-12 SDOH — HEALTH STABILITY: MENTAL HEALTH: HOW OFTEN DO YOU HAVE 6 OR MORE DRINKS ON ONE OCCASION?: MONTHLY

## 2019-09-12 ASSESSMENT — ENCOUNTER SYMPTOMS
WEIGHT LOSS: 0
EYE PAIN: 0
WEIGHT GAIN: 0
EYE REDNESS: 0
BACK PAIN: 1
ALTERED TEMPERATURE REGULATION: 1
EYE IRRITATION: 1
HALLUCINATIONS: 0
MUSCLE CRAMPS: 0
EYE WATERING: 0
DOUBLE VISION: 1
WEIGHT LOSS: 0
DIFFICULTY URINATING: 1
DYSURIA: 0
ARTHRALGIAS: 1
MUSCLE WEAKNESS: 0
FLANK PAIN: 0
DECREASED APPETITE: 0
MYALGIAS: 1
ALTERED TEMPERATURE REGULATION: 1
HEMATURIA: 0
DYSURIA: 0
NECK PAIN: 1
MUSCLE CRAMPS: 0
FEVER: 0
EYE REDNESS: 0
EYE PAIN: 0
FLANK PAIN: 0
JOINT SWELLING: 1
EYE IRRITATION: 1
MUSCLE WEAKNESS: 0
INCREASED ENERGY: 0
NECK PAIN: 1
BACK PAIN: 1
STIFFNESS: 0
WEIGHT GAIN: 0
POLYDIPSIA: 0
DOUBLE VISION: 1
FEVER: 0
EYE WATERING: 0
STIFFNESS: 0
MYALGIAS: 1
DECREASED APPETITE: 0
DIFFICULTY URINATING: 1
POLYDIPSIA: 0
JOINT SWELLING: 1
HEMATURIA: 0
INCREASED ENERGY: 0
ARTHRALGIAS: 1
HALLUCINATIONS: 0

## 2019-09-12 ASSESSMENT — PAIN SCALES - GENERAL: PAINLEVEL: MILD PAIN (3)

## 2019-09-12 NOTE — NURSING NOTE
Chief Complaint   Patient presents with     Establish Care     Pt is here to establish a new PCP.      Pain     Pt is here to discuss back pain.      Massiel Garcia LPN at 8:58 AM on 9/12/2019.

## 2019-09-12 NOTE — PROGRESS NOTES
PRIMARY CARE CENTER     History of Present Illness:       Zayra Ramirez is a 49 year old female with PMHx of chronic back of unclear etiology, GERD, ROBERT, ILD (resp bronchiolitis) and COPD who presents to establish care and discuss back pain.    The patient has a history of spinal cord stimulator placement on  c/b epidural abscess with removal on . She is currently completing a 6-week course of IV Ancef and is being followed by ID and Neurosurg. There are plans for her to have an MRI later this month. Also, patient is currently not taking her usual methotrexate and is wondering when she should restart, as her UE joints have been bothering her as of late. Other notable symptoms include R thigh numbness and tingling, which were present prior to spinal stimulator removal and feelings of poor emptying of the bladder. She denies bowel and bladder incontinence.    Patient is currently not working due to back concerns. She works as a . She is requesting to meet with pain medicine to discuss options for ongoing pain. She smokes 1 ppd and denies alcohol and recreational drug use.     Patient is , she had a hysterectomy in .       Problem, Medication and Allergy Lists were reviewed and are current.  Patient is a new patient to this clinic and so  I reviewed/updated the Past Medical History, the Family History and the Social History.       Review of Systems:       Answers for HPI/ROS submitted by the patient on 2019   General Symptoms: Yes  Skin Symptoms: No  HENT Symptoms: No  EYE SYMPTOMS: Yes  HEART SYMPTOMS: No  LUNG SYMPTOMS: No  INTESTINAL SYMPTOMS: No  URINARY SYMPTOMS: Yes  GYNECOLOGIC SYMPTOMS: No  BREAST SYMPTOMS: No  SKELETAL SYMPTOMS: Yes  BLOOD SYMPTOMS: No  NERVOUS SYSTEM SYMPTOMS: No  MENTAL HEALTH SYMPTOMS: No  Fever: No  Loss of appetite: No  Weight loss: No  Weight gain: No  Excessive thirst: No  Feeling hot or cold when others believe the temperature is normal: Yes  Loss of  height: No  Post-operative complications: No  Surgical site pain: No  Hallucinations: No  Change in or Loss of Energy: No  Hyperactivity: No  Confusion: No  Eye pain: No  Vision loss: No  Dry eyes: Yes  Watery eyes: No  Eye bulging: No  Double vision: Yes  Flashing of lights: No  Spots: No  Floaters: Yes  Redness: No  Crossed eyes: No  Tunnel Vision: No  Yellowing of eyes: No  Eye irritation: Yes  Trouble holding urine or incontinence: No  Pain or burning: No  Trouble starting or stopping: Yes  Increased frequency of urination: Yes  Blood in urine: No  Decreased frequency of urination: No  Frequent nighttime urination: Yes  Flank pain: No  Difficulty emptying bladder: Yes  Back pain: Yes  Muscle aches: Yes  Neck pain: Yes  Swollen joints: Yes  Joint pain: Yes  Bone pain: No  Muscle cramps: No  Muscle weakness: No  Joint stiffness: No  Bone fracture: No    I have personally reviewed/updated the complete ROS on the day of the visit.        Past Medical and Surgical History:     PAST MEDICAL HISTORY:   Past Medical History:   Diagnosis Date     Allergic rhinitis      Anemia      Arthritis      Asthma     copd     Dental abscess 8-2015     Depressive disorder      Gastroesophageal reflux disease      History of emphysema      Hoarseness      Hypertension      Morbid obesity with BMI of 40.0-44.9, adult (H)      Obstructive sleep apnea      Respiratory bronchiolitis interstitial lung disease (H)      Sleep apnea          Social History:     SOCIAL HISTORY:   Social History     Socioeconomic History     Marital status: Single   Tobacco Use     Smoking status: Current Every Day Smoker     Packs/day: 1.00     Years: 30.00     Pack years: 30.00     Types: Cigarettes     Start date: 1/1/1996     Smokeless tobacco: Never Used   Substance and Sexual Activity     Alcohol use: No     Binge frequency: Monthly     Drug use: No     Sexual activity: Never        Family History:      FAMILY HISTORY:   Family History   Problem Relation  Age of Onset     Other Cancer Father         base of tongue cancer at age ~65     Hypertension Father      Back Pain Father      Asthma Mother            Medications/Allergies:     Medication List:   Current Outpatient Medications   Medication Sig     acetaminophen (TYLENOL) 325 MG tablet Take 2 tablets (650 mg) by mouth every 4 hours as needed for other (multimodal surgical pain management along with NSAIDS and opioid medication as indicated based on pain control and physical function.)     albuterol (PROAIR HFA, PROVENTIL HFA, VENTOLIN HFA) 108 (90 BASE) MCG/ACT inhaler Inhale 2 puffs into the lungs every 6 hours as needed for shortness of breath / dyspnea or wheezing     ARIPiprazole (ABILIFY) 15 MG tablet Take 15 mg by mouth daily      ceFAZolin (ANCEF) intermittent infusion 2 g in 100mL NS (pre-mix) Inject 100 mLs (2 g) into the vein every 8 hours     cholecalciferol ( ULTRA STRENGTH) 2000 units CAPS TAKE ONE CAPSULE BY MOUTH DAILY IN AM     cyclobenzaprine (FLEXERIL) 10 MG tablet Take 1 tablet (10 mg) by mouth 3 times daily     DULoxetine (CYMBALTA) 60 MG capsule Take 120 mg by mouth daily      ferrous sulfate (FEROSUL) 325 (65 Fe) MG tablet Take 325 mg by mouth daily     fluticasone-salmeterol (ADVAIR) 500-50 MCG/DOSE diskus inhaler Inhale 1 puff into the lungs every 12 hours     folic acid (FOLVITE) 1 MG tablet Take 1 tablet (1 mg) by mouth daily     lisinopril-hydrochlorothiazide (PRINZIDE/ZESTORETIC) 20-25 MG per tablet Take 1 tablet by mouth daily     methotrexate 2.5 MG tablet Take 9 tabs once weekly     montelukast (SINGULAIR) 10 MG tablet Take 10 mg by mouth At Bedtime     oxyCODONE (ROXICODONE) 5 MG tablet Take 1-2 tablets (5-10 mg) by mouth every 3 hours as needed     OXYGEN-HELIUM IN 2-3L PRN during the day, 3L @ night     rOPINIRole (REQUIP) 2 MG tablet Take 2 mg by mouth nightly as needed      senna-docusate (SENOKOT-S/PERICOLACE) 8.6-50 MG tablet Take 1 tablet by mouth 2 times daily      vitamin C 500 MG TABS Take 1 tablet (500 mg) by mouth 2 times daily     zinc sulfate (ZINCATE) 220 (50 Zn) MG capsule Take 1 capsule (220 mg) by mouth daily     No current facility-administered medications for this visit.      Facility-Administered Medications Ordered in Other Visits   Medication     Lidocaine 1 % injection 0.5-5 mL     sodium chloride (PF) 0.9% PF flush 5-50 mL       Allergy List: All allergies reviewed and addressed      Physical Exam:         /81   Pulse 73   Wt 107.5 kg (237 lb)   LMP  (LMP Unknown)   SpO2 95%   BMI 41.98 kg/m    Body mass index is 41.98 kg/m .  Vitals were reviewed  GENERAL: well-appearing, alert and no distress  EYES: EOMI,  sclera-nonicteric  HENT: NCAT, ear canals and TM's normal; oropharynx without ulcers or lesions  NECK: no cervical lymphadenopathy, no asymmetry, masses, or scarsn  RESP: normal respiratory effort on room air, CTAB - no rales, rhonchi or wheezes  CV: regular rate and rhythm, normal S1 S2, no S3 or S4; pedal pulses +2 bilaterally   ABDOMEN: normoactive bowel sounds, soft, nontender, nondistended  SKIN: healing surgical incision on lower back   NEURO: AOx3, spontaneous movement of all extremities, normal strength and tone in LE  PSYCH: interactive, affect normal/bright, speech normal    Results:     No new laboratory or imaging results associated with this visit.    Assessment and Plan:     Zayra was seen today for establish care and pain.    Diagnoses and all orders for this visit:    Establish care with a new MD  No active primary care concerns. Back pain/abscess currently managed by Neurosurg and ID; however, patient will need long-term plan for pain management. Discussed with her and her father that opioids are not a good long-term solution and that they cannot be obtained through [facility] or Pain Dept.     Chronic midline low back pain, with sciatica presence unspecified  -     PAIN MANAGEMENT REFERRAL  - Patient to contact Rheumatologist for  earlier f/u and to discuss reinitiation of methotrexate  - Writer to confirm necessity MRI with ID, as no order is in system        Options for treatment and follow-up care were reviewed with the patient. Zayra Ramirez engaged in the decision making process and verbalized understanding of the options discussed and agreed with the final plan.    Sina Del Toro MD, PhD  Internal Medicine, PGY-2  Pager: [phone]    Sep 12, 2019    Patient was seen/plan of care discussed with Dr. Chew.  Teaching Physician Note:  I was present during the visit and the patient was seen and examined by me.   I discussed the case with the resident and agree with the note as documented by the resident with the following exceptions:  None.    Francoise Chew M.D.  Internal Medicine   pager [phone]

## 2019-09-12 NOTE — PATIENT INSTRUCTIONS
Please contact your Neurosurgeon and Rheumatologist about Follow-up      Primary Care Center Medication Refill Request Information:  * Please contact your pharmacy regarding ANY request for medication refills.  ** [facility] Prescription Fax = [phone]  * Please allow 3 business days for routine medication refills.  * Please allow 5 business days for controlled substance medication refills.     Primary Care Center Test Result notification information:  *You will be notified with in 7-10 days of your appointment day regarding the results of your test.  If you are on MyChart you will be notified as soon as the provider has reviewed the results and signed off on them.    Primary Care Center: [phone]          [facility]         Internal Medicine Resident                   Continuity Clinic    Who We Are    Resident Continuity Clinic is a part of the [facility].  Resident physicians see patients independently and establish a relationship with them over the course of their three-year residency program.  As with the Primary Care Clinic, our Resident Continuity Clinic models a group practice.  If your doctor is not available, you will be able to see another resident physician.  At the end of a resident s training, patients will be transitioned to a new resident physician for ongoing care.     We treat patients with a wide array of medical needs from routine physicals, to acute illnesses, to diabetes and blood pressure management, to complex medical illness.  What is a Resident Physician?    Resident physicians hold medical degrees and are doctors. They are training to become specialists in Internal Medicine. They work under the supervision of board-certified faculty physicians.  Expectations for Your Care    We strive to provide accessible, quality care at all times.    In order to provide this care, it is best to see your primary care resident doctor consistently rather switching between  providers.  In the event you do see another physician, you should schedule a follow-up visit with your usual primary care doctor.    If you are transitioning your care from another clinic, it is helpful to have your records available for your doctor to review.    We do not prescribe controlled substances, such as ADD medications or narcotic pain medications, on your first visit.  We will review your health records and concerns prior to devising a treatment plan with you in order to provide the best care.      Clinic Services     Extended clinic hours; patient  to help navigate your visit;  parking; laboratory and imaging services with evening and weekend hours    Multiple medical and surgical specialties in one building    Complementary services, including Nutrition, Integrative Medicine, Pharmacy consultations, Mental and Behavioral Health, Sports Medicine and Physical Therapy    Thank You    We would like to thank you for choosing the [facility] Internal Medicine Resident Continuity Clinic for your primary care. You are making a priceless contribution to the training of the next generation of health care practitioners.     Contact us at [phone] for appointments or questions.    Resident Clinic Hours are Tuesdays and Thursdays, 7:30am-5:00pm    Residents   Landen Chin MD  (Male)   Sergio Riggs MD   (Male)   Sandra Ge MD  (Female)  Surekha Tate MD   (Female)   Natalie Mcdermott MD   (Female)    Elena Burrows MD    (Female)   Cortes Gar MD  (Male)   Tahir Shaffer MD  (Male)    Nita Green MD  (Female)   Adam Del Toro MD  (Female)   Dominique Stoner MD    (Female)   Rhett Ortiz MD  (Male)   Sergei Chavez MD  (Male)   Jamaal Douglas MD  (Male)   BROOKE Avalos MD   (Male)   Jarrod Araiza MD  (Male)    Herlinda Sanchez MD (Female)   Yadiel Caputo MD  (Male)   Char Romero MD  (Male)   Aparna Bautista MD  (Male)   Yulisa Rainey MD    (Female)   Geovanna Alves,  MD  (Female)     Supervising Physicians   Francoise Chew, MD Casimiro Diallo, MD Geeta Saenz, MD Regis Tucker, MD Alireza Boykin, MD Thalia Siddiqui, MD Louise Tolliver, MD Gonzalez Salmon, MD Marley Sheets MD

## 2019-09-13 ENCOUNTER — OFFICE VISIT (OUTPATIENT)
Dept: RHEUMATOLOGY | Facility: CLINIC | Age: 49
End: 2019-09-13
Attending: INTERNAL MEDICINE
Payer: COMMERCIAL

## 2019-09-13 VITALS
TEMPERATURE: 98.2 F | OXYGEN SATURATION: 97 % | BODY MASS INDEX: 42.48 KG/M2 | WEIGHT: 239.8 LBS | DIASTOLIC BLOOD PRESSURE: 85 MMHG | SYSTOLIC BLOOD PRESSURE: 120 MMHG | HEART RATE: 100 BPM

## 2019-09-13 DIAGNOSIS — Z87.39 HISTORY OF SERONEGATIVE INFLAMMATORY ARTHRITIS: Primary | ICD-10-CM

## 2019-09-13 PROCEDURE — G0463 HOSPITAL OUTPT CLINIC VISIT: HCPCS | Mod: ZF

## 2019-09-13 ASSESSMENT — PAIN SCALES - GENERAL: PAINLEVEL: MILD PAIN (3)

## 2019-09-13 NOTE — LETTER
2019      RE: Zayra Ramirez  [address] [place] [address]       [facility]  Rheumatology Clinic  Panchito Saenz MD  2019     Name: Zayra Ramirez  MRN: [id number]  Age: 49 year old  : 1970  Referring provider: Aime Mason    Assessment and Plan:  # Inflammatory arthritis:  Patient relates gradually increasing bilateral shoulder and ankle discomfort. Methotrexate was discontinued over 30 days ago due to diagnosis of staphylococcal epidural abscess. On exam, the patient is comfortable, but demonstrates painful shoulder flexion and anterior tenderness, left greater than right. Blood work on  showed kidney function, liver function, and blood counts all normal. CRP was 13.4 which was down from 103 on .    I think that seronegative inflammatory arthritis is flaring. Patient had demonstrated significant response to methotrexate monotherapy prior to the abscess diagnosis. Inflammatory markers have fallen precipitously in the past several weeks, and patient feels well now having completed more than 30 days of intravenous antibiotics. I  that restart of methotrexate will be well-tolerated and safe. Pending Dr. Elizabeth's approval, I recommend the patient start methotrexate 12.5 mg (half dose) now, and resume full dose 22.5 mg weekly at week 2, coincident with planned final dose of Ancef given for a complete six week course. I suggest that patient continue acetaminophen 1000 mg every eight hours, combined with Naproxen 500 mg twice daily as needed for joint pain. I will arrange follow up in two months time.     Follow-up: Return in about 2 months (around 2019).    HPI:   Zayra Ramirez is a 49 year old female with a history including seronegative inflammatory arthritis and lower back pain who presents for follow-up. I last evaluated the patient on 2019, at which time the patient reported significant reduction in small joint predominant pain and  stiffness since methotrexate dose escalation, at 22.5 mg weekly for the past 4 weeks. Our plan was to continue methotrexate 22.5 mg weekly along with Acetaminophen 500 mg for residual joint pain and Naproxen 440 mg prn BID.    The patient was seen on September 4, 2019 by Dr. Tapia in Neurosurgery for follow up of thoracolumbar epidural abscess. History of recent spinal cord stimulator implantation, followed by epidural abscess complication in early August 2019 was reviewed. The patient had presented to the [place] ER on August 13th with back pain and bladder incontinence; MRI showed severe compression of the thecal sac in the lumbar region. She was taken to the OR emergently and abscess was drained, staphylococcus grew from cultured abscess fluid and the patient was started on Ancef. Immunomodulatory medications were held.    Today, she reports she has had a tough past few months, but she is feeling somewhat better currently. She states that she was taken off of methotrexate while in the hospital and has been taking Ancef three times daily through a PICC line in the right arm which she does herself at home. She believes that her course of Ancef will end on September 25th.     Since discontinuing methotrexate, the patient has reportedly experienced bilateral shoulder pain, right greater than left. She describes a lingering discomfort in the left shoulder as well as right shoulder pain that seems to be more significant than the left and also longer in duration when it is present. She endorses numbness in the right thigh, though this is unchanged from baseline. She denies problems with bowel or bladder control, or weakness in the arms or legs.     For pain control, the patient states she has been taking Tylenol and Naproxen 500 mg twice daily.     The patient states that her back pain has improved somewhat and her abscess is no longer draining. She reports that she is still having morning stiffness. She endorses  chills, a shooting sensation in the medial ankles, and continued hip pain which may be due to a decrease in movement and more time spent in a seated position. The patient denies any fevers or swelling.     The patient explains that she has not been working recently, but she was working at capacity before her recent spinal cord stimulator implantation.      She has another MRI planned        Review of Systems:   Pertinent items are noted in HPI or as below, remainder of complete ROS is negative.      No recent problems with hearing or vision. No swallowing problems.   No breathing difficulty, shortness of breath, coughing, or wheezing  No chest pain or palpitations  No heart burn, indigestion, abdominal pain, nausea, vomiting, diarrhea  No urination problems, no bloody, cloudy urine, no dysuria  No tingling, weakness  No headaches or confusion  No rashes. No easy bleeding or bruising.     Active Medications:      acetaminophen (TYLENOL) 325 MG tablet, Take 2 tablets (650 mg) by mouth every 4 hours as needed for other (multimodal surgical pain management along with NSAIDS and opioid medication as indicated based on pain control and physical function.), Disp: , Rfl:      albuterol (PROAIR HFA, PROVENTIL HFA, VENTOLIN HFA) 108 (90 BASE) MCG/ACT inhaler, Inhale 2 puffs into the lungs every 6 hours as needed for shortness of breath / dyspnea or wheezing, Disp: , Rfl:      ARIPiprazole (ABILIFY) 15 MG tablet, Take 15 mg by mouth daily , Disp: , Rfl:      ceFAZolin (ANCEF) intermittent infusion 2 g in 100mL NS (pre-mix), Inject 100 mLs (2 g) into the vein every 8 hours, Disp: 30 each, Rfl: 11     cholecalciferol ( ULTRA STRENGTH) 2000 units CAPS, TAKE ONE CAPSULE BY MOUTH DAILY IN AM, Disp: , Rfl:      cyclobenzaprine (FLEXERIL) 10 MG tablet, Take 1 tablet (10 mg) by mouth 3 times daily, Disp: 30 tablet, Rfl: 0     DULoxetine (CYMBALTA) 60 MG capsule, Take 120 mg by mouth daily , Disp: , Rfl:      ferrous sulfate  (FEROSUL) 325 (65 Fe) MG tablet, Take 325 mg by mouth daily, Disp: , Rfl: 0     fluticasone-salmeterol (ADVAIR) 500-50 MCG/DOSE diskus inhaler, Inhale 1 puff into the lungs every 12 hours, Disp: , Rfl:      folic acid (FOLVITE) 1 MG tablet, Take 1 tablet (1 mg) by mouth daily, Disp: 90 tablet, Rfl: 3     lisinopril-hydrochlorothiazide (PRINZIDE/ZESTORETIC) 20-25 MG per tablet, Take 1 tablet by mouth daily, Disp: 30 tablet, Rfl: 0     methotrexate 2.5 MG tablet, Take 9 tabs once weekly, Disp: 96 tablet, Rfl: 2     montelukast (SINGULAIR) 10 MG tablet, Take 10 mg by mouth At Bedtime, Disp: , Rfl:      oxyCODONE (ROXICODONE) 5 MG tablet, Take 1-2 tablets (5-10 mg) by mouth every 3 hours as needed, Disp: 45 tablet, Rfl: 0     OXYGEN-HELIUM IN, 2-3L PRN during the day, 3L @ night, Disp: , Rfl:      rOPINIRole (REQUIP) 2 MG tablet, Take 2 mg by mouth nightly as needed , Disp: , Rfl:      senna-docusate (SENOKOT-S/PERICOLACE) 8.6-50 MG tablet, Take 1 tablet by mouth 2 times daily, Disp: 45 tablet, Rfl: 0     vitamin C 500 MG TABS, Take 1 tablet (500 mg) by mouth 2 times daily, Disp: , Rfl:      zinc sulfate (ZINCATE) 220 (50 Zn) MG capsule, Take 1 capsule (220 mg) by mouth daily, Disp: , Rfl:   No current facility-administered medications for this visit.     Facility-Administered Medications Ordered in Other Visits:      Lidocaine 1 % injection 0.5-5 mL, 0.5-5 mL, Other, Once PRN, Gutierrez Candelario MD     sodium chloride (PF) 0.9% PF flush 5-50 mL, 5-50 mL, Intracatheter, Once PRN, Gutierrez Candelario MD    Allergies:   Bee Venom   Bees   Doxycycline   Erythromycin   Hydrocodone-Acetaminophen      Past Medical History:  Remarkable for moderate, persistent asthma, hypertension, bipolar II disorder, interstitial lung disease, cervical stenosis with myelopathy 2017.      Chronic midline low back pain  Facial cellulitis  Morbid (severe) obesity due to excess calories  Dental abscess  Hypoxia  Subcutaneous emphysema  Borderline  personality disorder  Stenosis of cervical spine with myelopathy  Esophageal stricture  Gastroesophageal reflux disease   Hypertension   Interstitial lung disease  Moderate persistent asthma without complication  Onychomycosis  Restless leg syndrome  Seasonal allergies  Smoker  Stress  Respiratory bronchiolitis interstitial lung disease     Past Surgical History:  Remarkable for surgical fusion  Hysterectomy 1997  rectocele and Cystocele surgery  Cholecystectomy     Family History:    The patient's family history includes Asthma in her mother; Back Pain in her father; Hypertension in her father; Cancer in her father; rheumatoid arthritis in her mother.  No family history of psoriasis.     Social History:  The patient reports that she has been smoking cigarettes. She started smoking about 23 years ago. She has a 30.00 pack-year smoking history. She has never used smokeless tobacco. She reports that she does not drink alcohol or use drugs. The patient works seasonally.   PCP: Adam Del Toro  Marital Status: Single    Physical Exam:   /85 (BP Location: Left arm, Patient Position: Sitting, Cuff Size: Adult Large)   Pulse 100   Temp 98.2  F (36.8  C)   Wt 108.8 kg (239 lb 12.8 oz)   LMP  (LMP Unknown)   SpO2 97%   BMI 42.48 kg/m      Wt Readings from Last 4 Encounters:   09/13/19 108.8 kg (239 lb 12.8 oz)   09/12/19 107.5 kg (237 lb)   08/28/19 105.5 kg (232 lb 8 oz)   08/18/19 113.4 kg (250 lb)     Constitutional: Well-developed, appearing stated age; cooperative  Eyes: Normal EOM, PERRLA, vision, conjunctiva, sclera  ENT: Normal external ears, nose, hearing, lips, teeth, gums, throat. No mucous membrane lesions, normal saliva pool  Neck: No mass or thyroid enlargement  Resp: Lungs clear to auscultation, nl to palpation  CV: RRR, no murmurs, rubs or gallops, no edema  GI: No ABD mass or tenderness, no HSM  : Not tested  Lymph: No cervical, supraclavicular, inguinal or epitrochlear nodes  MS: The TMJ,  neck, elbow, wrist, MCP/PIP/DIP, spine, hip, knee, ankle, and foot MTP/IP joints were examined and found normal. No active synovitis or altered joint anatomy. Full joint ROM. Normal  strength. No dactylitis, tenosynovitis, enthesopathy. Some anterior shoulder tenderness on the right, though range of motion is preserved.  Skin: No nail pitting, alopecia, rash, nodules or lesions  Neuro: Normal cranial nerves, strength, sensation, DTRs.   Psych: Normal judgement, orientation, memory, affect.     Laboratory:   RHEUM RESULTS Latest Ref Rng & Units 8/26/2019 9/3/2019 9/9/2019   SED RATE 0 - 20 mm/h 63(H) 63(H) 25(H)   CRP, INFLAMMATION 0.0 - 8.0 mg/L 103.0(H) 11.4(H) 13.4(H)   AST 0 - 45 U/L 20 26 20   ALT 0 - 50 U/L 11 9 12   ALBUMIN 3.4 - 5.0 g/dL 2.9(L) 3.2(L) 3.5   WBC 4.0 - 11.0 10e9/L 10.7 7.7 7.4   RBC 3.8 - 5.2 10e12/L 4.14 3.80 3.99   HGB 11.7 - 15.7 g/dL 13.1 12.3 12.6   HCT 35.0 - 47.0 % 39.5 36.2 38.9   MCV 78 - 100 fl 95 95 98   MCHC 31.5 - 36.5 g/dL 33.2 34.0 32.4   RDW 10.0 - 15.0 % 13.2 13.2 13.2    - 450 10e9/L 454(H) 400 315   CREATININE 0.52 - 1.04 mg/dL 0.58 0.68 0.71   GFR ESTIMATE, IF BLACK >60 mL/min/[1.73:m2] >90 >90 >90   GFR ESTIMATE >60 mL/min/[1.73:m2] >90 >90 >90     Scribe Disclosure:  Rik RICO, am serving as a scribe to document services personally performed by Panchito Saenz MD at this visit, based upon the provider's statements to me. All documentation has been reviewed by the aforementioned provider prior to being entered into the official medical record.     Panchito Saenz MD

## 2019-09-13 NOTE — NURSING NOTE
Chief Complaint   Patient presents with     RECHECK     follow up     /85 (BP Location: Left arm, Patient Position: Sitting, Cuff Size: Adult Large)   Pulse 100   Temp 98.2  F (36.8  C)   Wt 108.8 kg (239 lb 12.8 oz)   LMP  (LMP Unknown)   SpO2 97%   BMI 42.48 kg/m        Keyon Cruz, EMT

## 2019-09-13 NOTE — PATIENT INSTRUCTIONS
Diagnosis:  1. Inflammatory arthritis, flaring with shoulder pain    Plan:  -- I recommend starting with 5 tablets methotrexate your first week then back up to 9 tablets the following week.   -- I will contact Dr. Elizabeth to confirm no objection to restarting medication.

## 2019-09-13 NOTE — PROGRESS NOTES
[facility]  Rheumatology Clinic  aPnchito Saenz MD  2019     Name: Zayra Ramirez  MRN: [id number]  Age: 49 year old  : 1970  Referring provider: Aime Mason    Assessment and Plan:  # Inflammatory arthritis:  Patient relates gradually increasing bilateral shoulder and ankle discomfort. Methotrexate was discontinued over 30 days ago due to diagnosis of staphylococcal epidural abscess. On exam, the patient is comfortable, but demonstrates painful shoulder flexion and anterior tenderness, left greater than right. Blood work on  showed kidney function, liver function, and blood counts all normal. CRP was 13.4 which was down from 103 on .    I think that seronegative inflammatory arthritis is flaring. Patient had demonstrated significant response to methotrexate monotherapy prior to the abscess diagnosis. Inflammatory markers have fallen precipitously in the past several weeks, and patient feels well now having completed more than 30 days of intravenous antibiotics. I  that restart of methotrexate will be well-tolerated and safe. Pending Dr. Elizabeth's approval, I recommend the patient start methotrexate 12.5 mg (half dose) now, and resume full dose 22.5 mg weekly at week 2, coincident with planned final dose of Ancef given for a complete six week course. I suggest that patient continue acetaminophen 1000 mg every eight hours, combined with Naproxen 500 mg twice daily as needed for joint pain. I will arrange follow up in two months time.     Follow-up: Return in about 2 months (around 2019).    HPI:   Zyara Ramirez is a 49 year old female with a history including seronegative inflammatory arthritis and lower back pain who presents for follow-up. I last evaluated the patient on 2019, at which time the patient reported significant reduction in small joint predominant pain and stiffness since methotrexate dose escalation, at 22.5 mg weekly for the past 4 weeks. Our  plan was to continue methotrexate 22.5 mg weekly along with Acetaminophen 500 mg for residual joint pain and Naproxen 440 mg prn BID.    The patient was seen on September 4, 2019 by Dr. Tapia in Neurosurgery for follow up of thoracolumbar epidural abscess. History of recent spinal cord stimulator implantation, followed by epidural abscess complication in early August 2019 was reviewed. The patient had presented to the [place] ER on August 13th with back pain and bladder incontinence; MRI showed severe compression of the thecal sac in the lumbar region. She was taken to the OR emergently and abscess was drained, staphylococcus grew from cultured abscess fluid and the patient was started on Ancef. Immunomodulatory medications were held.    Today, she reports she has had a tough past few months, but she is feeling somewhat better currently. She states that she was taken off of methotrexate while in the hospital and has been taking Ancef three times daily through a PICC line in the right arm which she does herself at home. She believes that her course of Ancef will end on September 25th.     Since discontinuing methotrexate, the patient has reportedly experienced bilateral shoulder pain, right greater than left. She describes a lingering discomfort in the left shoulder as well as right shoulder pain that seems to be more significant than the left and also longer in duration when it is present. She endorses numbness in the right thigh, though this is unchanged from baseline. She denies problems with bowel or bladder control, or weakness in the arms or legs.     For pain control, the patient states she has been taking Tylenol and Naproxen 500 mg twice daily.     The patient states that her back pain has improved somewhat and her abscess is no longer draining. She reports that she is still having morning stiffness. She endorses chills, a shooting sensation in the medial ankles, and continued hip pain which may be due  to a decrease in movement and more time spent in a seated position. The patient denies any fevers or swelling.     The patient explains that she has not been working recently, but she was working at capacity before her recent spinal cord stimulator implantation.      She has another MRI planned        Review of Systems:   Pertinent items are noted in HPI or as below, remainder of complete ROS is negative.      No recent problems with hearing or vision. No swallowing problems.   No breathing difficulty, shortness of breath, coughing, or wheezing  No chest pain or palpitations  No heart burn, indigestion, abdominal pain, nausea, vomiting, diarrhea  No urination problems, no bloody, cloudy urine, no dysuria  No tingling, weakness  No headaches or confusion  No rashes. No easy bleeding or bruising.     Active Medications:      acetaminophen (TYLENOL) 325 MG tablet, Take 2 tablets (650 mg) by mouth every 4 hours as needed for other (multimodal surgical pain management along with NSAIDS and opioid medication as indicated based on pain control and physical function.), Disp: , Rfl:      albuterol (PROAIR HFA, PROVENTIL HFA, VENTOLIN HFA) 108 (90 BASE) MCG/ACT inhaler, Inhale 2 puffs into the lungs every 6 hours as needed for shortness of breath / dyspnea or wheezing, Disp: , Rfl:      ARIPiprazole (ABILIFY) 15 MG tablet, Take 15 mg by mouth daily , Disp: , Rfl:      ceFAZolin (ANCEF) intermittent infusion 2 g in 100mL NS (pre-mix), Inject 100 mLs (2 g) into the vein every 8 hours, Disp: 30 each, Rfl: 11     cholecalciferol ( ULTRA STRENGTH) 2000 units CAPS, TAKE ONE CAPSULE BY MOUTH DAILY IN AM, Disp: , Rfl:      cyclobenzaprine (FLEXERIL) 10 MG tablet, Take 1 tablet (10 mg) by mouth 3 times daily, Disp: 30 tablet, Rfl: 0     DULoxetine (CYMBALTA) 60 MG capsule, Take 120 mg by mouth daily , Disp: , Rfl:      ferrous sulfate (FEROSUL) 325 (65 Fe) MG tablet, Take 325 mg by mouth daily, Disp: , Rfl: 0      fluticasone-salmeterol (ADVAIR) 500-50 MCG/DOSE diskus inhaler, Inhale 1 puff into the lungs every 12 hours, Disp: , Rfl:      folic acid (FOLVITE) 1 MG tablet, Take 1 tablet (1 mg) by mouth daily, Disp: 90 tablet, Rfl: 3     lisinopril-hydrochlorothiazide (PRINZIDE/ZESTORETIC) 20-25 MG per tablet, Take 1 tablet by mouth daily, Disp: 30 tablet, Rfl: 0     methotrexate 2.5 MG tablet, Take 9 tabs once weekly, Disp: 96 tablet, Rfl: 2     montelukast (SINGULAIR) 10 MG tablet, Take 10 mg by mouth At Bedtime, Disp: , Rfl:      oxyCODONE (ROXICODONE) 5 MG tablet, Take 1-2 tablets (5-10 mg) by mouth every 3 hours as needed, Disp: 45 tablet, Rfl: 0     OXYGEN-HELIUM IN, 2-3L PRN during the day, 3L @ night, Disp: , Rfl:      rOPINIRole (REQUIP) 2 MG tablet, Take 2 mg by mouth nightly as needed , Disp: , Rfl:      senna-docusate (SENOKOT-S/PERICOLACE) 8.6-50 MG tablet, Take 1 tablet by mouth 2 times daily, Disp: 45 tablet, Rfl: 0     vitamin C 500 MG TABS, Take 1 tablet (500 mg) by mouth 2 times daily, Disp: , Rfl:      zinc sulfate (ZINCATE) 220 (50 Zn) MG capsule, Take 1 capsule (220 mg) by mouth daily, Disp: , Rfl:   No current facility-administered medications for this visit.     Facility-Administered Medications Ordered in Other Visits:      Lidocaine 1 % injection 0.5-5 mL, 0.5-5 mL, Other, Once PRN, Gutierrez Candelario MD     sodium chloride (PF) 0.9% PF flush 5-50 mL, 5-50 mL, Intracatheter, Once PRN, Gutierrez Candelario MD    Allergies:   Bee Venom   Bees   Doxycycline   Erythromycin   Hydrocodone-Acetaminophen      Past Medical History:  Remarkable for moderate, persistent asthma, hypertension, bipolar II disorder, interstitial lung disease, cervical stenosis with myelopathy 2017.      Chronic midline low back pain  Facial cellulitis  Morbid (severe) obesity due to excess calories  Dental abscess  Hypoxia  Subcutaneous emphysema  Borderline personality disorder  Stenosis of cervical spine with myelopathy  Esophageal  stricture  Gastroesophageal reflux disease   Hypertension   Interstitial lung disease  Moderate persistent asthma without complication  Onychomycosis  Restless leg syndrome  Seasonal allergies  Smoker  Stress  Respiratory bronchiolitis interstitial lung disease     Past Surgical History:  Remarkable for surgical fusion  Hysterectomy 1997  rectocele and Cystocele surgery  Cholecystectomy     Family History:    The patient's family history includes Asthma in her mother; Back Pain in her father; Hypertension in her father; Cancer in her father; rheumatoid arthritis in her mother.  No family history of psoriasis.     Social History:  The patient reports that she has been smoking cigarettes. She started smoking about 23 years ago. She has a 30.00 pack-year smoking history. She has never used smokeless tobacco. She reports that she does not drink alcohol or use drugs. The patient works seasonally.   PCP: Adam Del Toro  Marital Status: Single    Physical Exam:   /85 (BP Location: Left arm, Patient Position: Sitting, Cuff Size: Adult Large)   Pulse 100   Temp 98.2  F (36.8  C)   Wt 108.8 kg (239 lb 12.8 oz)   LMP  (LMP Unknown)   SpO2 97%   BMI 42.48 kg/m     Wt Readings from Last 4 Encounters:   09/13/19 108.8 kg (239 lb 12.8 oz)   09/12/19 107.5 kg (237 lb)   08/28/19 105.5 kg (232 lb 8 oz)   08/18/19 113.4 kg (250 lb)     Constitutional: Well-developed, appearing stated age; cooperative  Eyes: Normal EOM, PERRLA, vision, conjunctiva, sclera  ENT: Normal external ears, nose, hearing, lips, teeth, gums, throat. No mucous membrane lesions, normal saliva pool  Neck: No mass or thyroid enlargement  Resp: Lungs clear to auscultation, nl to palpation  CV: RRR, no murmurs, rubs or gallops, no edema  GI: No ABD mass or tenderness, no HSM  : Not tested  Lymph: No cervical, supraclavicular, inguinal or epitrochlear nodes  MS: The TMJ, neck, elbow, wrist, MCP/PIP/DIP, spine, hip, knee, ankle, and foot MTP/IP  joints were examined and found normal. No active synovitis or altered joint anatomy. Full joint ROM. Normal  strength. No dactylitis, tenosynovitis, enthesopathy. Some anterior shoulder tenderness on the right, though range of motion is preserved.  Skin: No nail pitting, alopecia, rash, nodules or lesions  Neuro: Normal cranial nerves, strength, sensation, DTRs.   Psych: Normal judgement, orientation, memory, affect.     Laboratory:   RHEUM RESULTS Latest Ref Rng & Units 8/26/2019 9/3/2019 9/9/2019   SED RATE 0 - 20 mm/h 63(H) 63(H) 25(H)   CRP, INFLAMMATION 0.0 - 8.0 mg/L 103.0(H) 11.4(H) 13.4(H)   AST 0 - 45 U/L 20 26 20   ALT 0 - 50 U/L 11 9 12   ALBUMIN 3.4 - 5.0 g/dL 2.9(L) 3.2(L) 3.5   WBC 4.0 - 11.0 10e9/L 10.7 7.7 7.4   RBC 3.8 - 5.2 10e12/L 4.14 3.80 3.99   HGB 11.7 - 15.7 g/dL 13.1 12.3 12.6   HCT 35.0 - 47.0 % 39.5 36.2 38.9   MCV 78 - 100 fl 95 95 98   MCHC 31.5 - 36.5 g/dL 33.2 34.0 32.4   RDW 10.0 - 15.0 % 13.2 13.2 13.2    - 450 10e9/L 454(H) 400 315   CREATININE 0.52 - 1.04 mg/dL 0.58 0.68 0.71   GFR ESTIMATE, IF BLACK >60 mL/min/[1.73:m2] >90 >90 >90   GFR ESTIMATE >60 mL/min/[1.73:m2] >90 >90 >90     Scribe Disclosure:  Rik RICO, am serving as a scribe to document services personally performed by Panchito Saenz MD at this visit, based upon the provider's statements to me. All documentation has been reviewed by the aforementioned provider prior to being entered into the official medical record.

## 2019-09-13 NOTE — LETTER
2019       RE: Zayra Ramirez  [address] [place] [address]     Dear Colleague,    Thank you for referring your patient, Zayra Ramirez, to the [facility] RHEUMATOLOGY at [facility]. Please see a copy of my visit note below.    [facility]  Rheumatology Clinic  Panchito Saenz MD  2019     Name: Zayra Ramirez  MRN: [id number]  Age: 49 year old  : 1970  Referring provider: Aime Mason    Assessment and Plan:  # Inflammatory arthritis:  Patient relates gradually increasing bilateral shoulder and ankle discomfort. Methotrexate was discontinued over 30 days ago due to diagnosis of staphylococcal epidural abscess. On exam, the patient is comfortable, but demonstrates painful shoulder flexion and anterior tenderness, left greater than right. Blood work on  showed kidney function, liver function, and blood counts all normal. CRP was 13.4 which was down from 103 on .    I think that seronegative inflammatory arthritis is flaring. Patient had demonstrated significant response to methotrexate monotherapy prior to the abscess diagnosis. Inflammatory markers have fallen precipitously in the past several weeks, and patient feels well now having completed more than 30 days of intravenous antibiotics. I  that restart of methotrexate will be well-tolerated and safe. Pending Dr. Elizabeth's approval, I recommend the patient start methotrexate 12.5 mg (half dose) now, and resume full dose 22.5 mg weekly at week 2, coincident with planned final dose of Ancef given for a complete six week course. I suggest that patient continue acetaminophen 1000 mg every eight hours, combined with Naproxen 500 mg twice daily as needed for joint pain. I will arrange follow up in two months time.     Follow-up: Return in about 2 months (around 2019).    HPI:   Zayra Ramirez is a 49 year old female with a history including seronegative inflammatory  arthritis and lower back pain who presents for follow-up. I last evaluated the patient on 06/28/2019, at which time the patient reported significant reduction in small joint predominant pain and stiffness since methotrexate dose escalation, at 22.5 mg weekly for the past 4 weeks. Our plan was to continue methotrexate 22.5 mg weekly along with Acetaminophen 500 mg for residual joint pain and Naproxen 440 mg prn BID.    The patient was seen on September 4, 2019 by Dr. Tapia in Neurosurgery for follow up of thoracolumbar epidural abscess. History of recent spinal cord stimulator implantation, followed by epidural abscess complication in early August 2019 was reviewed. The patient had presented to the [place] ER on August 13th with back pain and bladder incontinence; MRI showed severe compression of the thecal sac in the lumbar region. She was taken to the OR emergently and abscess was drained, staphylococcus grew from cultured abscess fluid and the patient was started on Ancef. Immunomodulatory medications were held.    Today, she reports she has had a tough past few months, but she is feeling somewhat better currently. She states that she was taken off of methotrexate while in the hospital and has been taking Ancef three times daily through a PICC line in the right arm which she does herself at home. She believes that her course of Ancef will end on September 25th.     Since discontinuing methotrexate, the patient has reportedly experienced bilateral shoulder pain, right greater than left. She describes a lingering discomfort in the left shoulder as well as right shoulder pain that seems to be more significant than the left and also longer in duration when it is present. She endorses numbness in the right thigh, though this is unchanged from baseline. She denies problems with bowel or bladder control, or weakness in the arms or legs.     For pain control, the patient states she has been taking Tylenol and Naproxen  500 mg twice daily.     The patient states that her back pain has improved somewhat and her abscess is no longer draining. She reports that she is still having morning stiffness. She endorses chills, a shooting sensation in the medial ankles, and continued hip pain which may be due to a decrease in movement and more time spent in a seated position. The patient denies any fevers or swelling.     The patient explains that she has not been working recently, but she was working at capacity before her recent spinal cord stimulator implantation.      She has another MRI planned        Review of Systems:   Pertinent items are noted in HPI or as below, remainder of complete ROS is negative.      No recent problems with hearing or vision. No swallowing problems.   No breathing difficulty, shortness of breath, coughing, or wheezing  No chest pain or palpitations  No heart burn, indigestion, abdominal pain, nausea, vomiting, diarrhea  No urination problems, no bloody, cloudy urine, no dysuria  No tingling, weakness  No headaches or confusion  No rashes. No easy bleeding or bruising.     Active Medications:      acetaminophen (TYLENOL) 325 MG tablet, Take 2 tablets (650 mg) by mouth every 4 hours as needed for other (multimodal surgical pain management along with NSAIDS and opioid medication as indicated based on pain control and physical function.), Disp: , Rfl:      albuterol (PROAIR HFA, PROVENTIL HFA, VENTOLIN HFA) 108 (90 BASE) MCG/ACT inhaler, Inhale 2 puffs into the lungs every 6 hours as needed for shortness of breath / dyspnea or wheezing, Disp: , Rfl:      ARIPiprazole (ABILIFY) 15 MG tablet, Take 15 mg by mouth daily , Disp: , Rfl:      ceFAZolin (ANCEF) intermittent infusion 2 g in 100mL NS (pre-mix), Inject 100 mLs (2 g) into the vein every 8 hours, Disp: 30 each, Rfl: 11     cholecalciferol ( ULTRA STRENGTH) 2000 units CAPS, TAKE ONE CAPSULE BY MOUTH DAILY IN AM, Disp: , Rfl:      cyclobenzaprine (FLEXERIL)  10 MG tablet, Take 1 tablet (10 mg) by mouth 3 times daily, Disp: 30 tablet, Rfl: 0     DULoxetine (CYMBALTA) 60 MG capsule, Take 120 mg by mouth daily , Disp: , Rfl:      ferrous sulfate (FEROSUL) 325 (65 Fe) MG tablet, Take 325 mg by mouth daily, Disp: , Rfl: 0     fluticasone-salmeterol (ADVAIR) 500-50 MCG/DOSE diskus inhaler, Inhale 1 puff into the lungs every 12 hours, Disp: , Rfl:      folic acid (FOLVITE) 1 MG tablet, Take 1 tablet (1 mg) by mouth daily, Disp: 90 tablet, Rfl: 3     lisinopril-hydrochlorothiazide (PRINZIDE/ZESTORETIC) 20-25 MG per tablet, Take 1 tablet by mouth daily, Disp: 30 tablet, Rfl: 0     methotrexate 2.5 MG tablet, Take 9 tabs once weekly, Disp: 96 tablet, Rfl: 2     montelukast (SINGULAIR) 10 MG tablet, Take 10 mg by mouth At Bedtime, Disp: , Rfl:      oxyCODONE (ROXICODONE) 5 MG tablet, Take 1-2 tablets (5-10 mg) by mouth every 3 hours as needed, Disp: 45 tablet, Rfl: 0     OXYGEN-HELIUM IN, 2-3L PRN during the day, 3L @ night, Disp: , Rfl:      rOPINIRole (REQUIP) 2 MG tablet, Take 2 mg by mouth nightly as needed , Disp: , Rfl:      senna-docusate (SENOKOT-S/PERICOLACE) 8.6-50 MG tablet, Take 1 tablet by mouth 2 times daily, Disp: 45 tablet, Rfl: 0     vitamin C 500 MG TABS, Take 1 tablet (500 mg) by mouth 2 times daily, Disp: , Rfl:      zinc sulfate (ZINCATE) 220 (50 Zn) MG capsule, Take 1 capsule (220 mg) by mouth daily, Disp: , Rfl:   No current facility-administered medications for this visit.     Facility-Administered Medications Ordered in Other Visits:      Lidocaine 1 % injection 0.5-5 mL, 0.5-5 mL, Other, Once PRN, Gutierrez Candelario MD     sodium chloride (PF) 0.9% PF flush 5-50 mL, 5-50 mL, Intracatheter, Once PRN, Gutierrez Candelario MD    Allergies:   Bee Venom   Bees   Doxycycline   Erythromycin   Hydrocodone-Acetaminophen      Past Medical History:  Remarkable for moderate, persistent asthma, hypertension, bipolar II disorder, interstitial lung disease, cervical stenosis  with myelopathy 2017.      Chronic midline low back pain  Facial cellulitis  Morbid (severe) obesity due to excess calories  Dental abscess  Hypoxia  Subcutaneous emphysema  Borderline personality disorder  Stenosis of cervical spine with myelopathy  Esophageal stricture  Gastroesophageal reflux disease   Hypertension   Interstitial lung disease  Moderate persistent asthma without complication  Onychomycosis  Restless leg syndrome  Seasonal allergies  Smoker  Stress  Respiratory bronchiolitis interstitial lung disease     Past Surgical History:  Remarkable for surgical fusion  Hysterectomy 1997  rectocele and Cystocele surgery  Cholecystectomy     Family History:    The patient's family history includes Asthma in her mother; Back Pain in her father; Hypertension in her father; Cancer in her father; rheumatoid arthritis in her mother.  No family history of psoriasis.     Social History:  The patient reports that she has been smoking cigarettes. She started smoking about 23 years ago. She has a 30.00 pack-year smoking history. She has never used smokeless tobacco. She reports that she does not drink alcohol or use drugs. The patient works seasonally.   PCP: Adam Del Toro  Marital Status: Single    Physical Exam:   /85 (BP Location: Left arm, Patient Position: Sitting, Cuff Size: Adult Large)   Pulse 100   Temp 98.2  F (36.8  C)   Wt 108.8 kg (239 lb 12.8 oz)   LMP  (LMP Unknown)   SpO2 97%   BMI 42.48 kg/m      Wt Readings from Last 4 Encounters:   09/13/19 108.8 kg (239 lb 12.8 oz)   09/12/19 107.5 kg (237 lb)   08/28/19 105.5 kg (232 lb 8 oz)   08/18/19 113.4 kg (250 lb)     Constitutional: Well-developed, appearing stated age; cooperative  Eyes: Normal EOM, PERRLA, vision, conjunctiva, sclera  ENT: Normal external ears, nose, hearing, lips, teeth, gums, throat. No mucous membrane lesions, normal saliva pool  Neck: No mass or thyroid enlargement  Resp: Lungs clear to auscultation, nl to  palpation  CV: RRR, no murmurs, rubs or gallops, no edema  GI: No ABD mass or tenderness, no HSM  : Not tested  Lymph: No cervical, supraclavicular, inguinal or epitrochlear nodes  MS: The TMJ, neck, elbow, wrist, MCP/PIP/DIP, spine, hip, knee, ankle, and foot MTP/IP joints were examined and found normal. No active synovitis or altered joint anatomy. Full joint ROM. Normal  strength. No dactylitis, tenosynovitis, enthesopathy. Some anterior shoulder tenderness on the right, though range of motion is preserved.  Skin: No nail pitting, alopecia, rash, nodules or lesions  Neuro: Normal cranial nerves, strength, sensation, DTRs.   Psych: Normal judgement, orientation, memory, affect.     Laboratory:   RHEUM RESULTS Latest Ref Rng & Units 8/26/2019 9/3/2019 9/9/2019   SED RATE 0 - 20 mm/h 63(H) 63(H) 25(H)   CRP, INFLAMMATION 0.0 - 8.0 mg/L 103.0(H) 11.4(H) 13.4(H)   AST 0 - 45 U/L 20 26 20   ALT 0 - 50 U/L 11 9 12   ALBUMIN 3.4 - 5.0 g/dL 2.9(L) 3.2(L) 3.5   WBC 4.0 - 11.0 10e9/L 10.7 7.7 7.4   RBC 3.8 - 5.2 10e12/L 4.14 3.80 3.99   HGB 11.7 - 15.7 g/dL 13.1 12.3 12.6   HCT 35.0 - 47.0 % 39.5 36.2 38.9   MCV 78 - 100 fl 95 95 98   MCHC 31.5 - 36.5 g/dL 33.2 34.0 32.4   RDW 10.0 - 15.0 % 13.2 13.2 13.2    - 450 10e9/L 454(H) 400 315   CREATININE 0.52 - 1.04 mg/dL 0.58 0.68 0.71   GFR ESTIMATE, IF BLACK >60 mL/min/[1.73:m2] >90 >90 >90   GFR ESTIMATE >60 mL/min/[1.73:m2] >90 >90 >90     Scribe Disclosure:  I, Rik Dujosue, am serving as a scribe to document services personally performed by Panchito Saenz MD at this visit, based upon the provider's statements to me. All documentation has been reviewed by the aforementioned provider prior to being entered into the official medical record.     Again, thank you for allowing me to participate in the care of your patient.      Sincerely,    Panchito Saenz MD

## 2019-09-15 RX ORDER — HYDROXYZINE HYDROCHLORIDE 50 MG/1
TABLET, FILM COATED ORAL
Qty: 2 TABLET | Refills: 0 | Status: SHIPPED | OUTPATIENT
Start: 2019-09-15 | End: 2019-11-20

## 2019-09-16 ENCOUNTER — DOCUMENTATION ONLY (OUTPATIENT)
Dept: INFECTIOUS DISEASES | Facility: CLINIC | Age: 49
End: 2019-09-16

## 2019-09-16 LAB
ALBUMIN SERPL-MCNC: 3.5 G/DL (ref 3.4–5)
ALP SERPL-CCNC: 88 U/L (ref 40–150)
ALT SERPL W P-5'-P-CCNC: 7 U/L (ref 0–50)
ANION GAP SERPL CALCULATED.3IONS-SCNC: 5 MMOL/L (ref 3–14)
AST SERPL W P-5'-P-CCNC: 14 U/L (ref 0–45)
BASOPHILS # BLD AUTO: 0.1 10E9/L (ref 0–0.2)
BASOPHILS NFR BLD AUTO: 0.9 %
BILIRUB SERPL-MCNC: 0.2 MG/DL (ref 0.2–1.3)
BUN SERPL-MCNC: 11 MG/DL (ref 7–30)
CALCIUM SERPL-MCNC: 8.8 MG/DL (ref 8.5–10.1)
CHLORIDE SERPL-SCNC: 104 MMOL/L (ref 94–109)
CO2 SERPL-SCNC: 30 MMOL/L (ref 20–32)
CREAT SERPL-MCNC: 0.59 MG/DL (ref 0.52–1.04)
CRP SERPL-MCNC: 10.2 MG/L (ref 0–8)
DIFFERENTIAL METHOD BLD: NORMAL
EOSINOPHIL # BLD AUTO: 0.2 10E9/L (ref 0–0.7)
EOSINOPHIL NFR BLD AUTO: 3.4 %
ERYTHROCYTE [DISTWIDTH] IN BLOOD BY AUTOMATED COUNT: 13.3 % (ref 10–15)
ERYTHROCYTE [SEDIMENTATION RATE] IN BLOOD BY WESTERGREN METHOD: 15 MM/H (ref 0–20)
FUNGUS SPEC CULT: NORMAL
GFR SERPL CREATININE-BSD FRML MDRD: >90 ML/MIN/{1.73_M2}
GLUCOSE SERPL-MCNC: 126 MG/DL (ref 70–99)
HCT VFR BLD AUTO: 39.1 % (ref 35–47)
HGB BLD-MCNC: 12.9 G/DL (ref 11.7–15.7)
IMM GRANULOCYTES # BLD: 0 10E9/L (ref 0–0.4)
IMM GRANULOCYTES NFR BLD: 0.3 %
LYMPHOCYTES # BLD AUTO: 1.8 10E9/L (ref 0.8–5.3)
LYMPHOCYTES NFR BLD AUTO: 27.3 %
MCH RBC QN AUTO: 31.9 PG (ref 26.5–33)
MCHC RBC AUTO-ENTMCNC: 33 G/DL (ref 31.5–36.5)
MCV RBC AUTO: 97 FL (ref 78–100)
MONOCYTES # BLD AUTO: 0.4 10E9/L (ref 0–1.3)
MONOCYTES NFR BLD AUTO: 6.8 %
NEUTROPHILS # BLD AUTO: 4 10E9/L (ref 1.6–8.3)
NEUTROPHILS NFR BLD AUTO: 61.3 %
NRBC # BLD AUTO: 0 10*3/UL
NRBC BLD AUTO-RTO: 0 /100
PLATELET # BLD AUTO: 221 10E9/L (ref 150–450)
POTASSIUM SERPL-SCNC: 3.3 MMOL/L (ref 3.4–5.3)
PROT SERPL-MCNC: 6.6 G/DL (ref 6.8–8.8)
RBC # BLD AUTO: 4.04 10E12/L (ref 3.8–5.2)
SODIUM SERPL-SCNC: 139 MMOL/L (ref 133–144)
SPECIMEN SOURCE: NORMAL
WBC # BLD AUTO: 6.5 10E9/L (ref 4–11)

## 2019-09-16 PROCEDURE — 85025 COMPLETE CBC W/AUTO DIFF WBC: CPT | Performed by: INTERNAL MEDICINE

## 2019-09-16 PROCEDURE — 80053 COMPREHEN METABOLIC PANEL: CPT | Performed by: INTERNAL MEDICINE

## 2019-09-16 PROCEDURE — 85652 RBC SED RATE AUTOMATED: CPT | Performed by: INTERNAL MEDICINE

## 2019-09-16 PROCEDURE — 86140 C-REACTIVE PROTEIN: CPT | Performed by: INTERNAL MEDICINE

## 2019-09-16 NOTE — PROGRESS NOTES
[facility] labs (9/16):    - WBC: 6.5; H/H: 12.9/39.1; Plat: 221  - Na: 139; K: 3.3; BUN: 11; Cr: 0.59; LFTs within normal limits  - CRP: 10.2 (trending down)  - ESR: 15 (trending down)

## 2019-09-17 ENCOUNTER — HOME INFUSION (PRE-WILLOW HOME INFUSION) (OUTPATIENT)
Dept: PHARMACY | Facility: CLINIC | Age: 49
End: 2019-09-17

## 2019-09-17 DIAGNOSIS — F41.9 ANXIETY: ICD-10-CM

## 2019-09-17 DIAGNOSIS — G06.1 SPINAL EPIDURAL ABSCESS: Primary | ICD-10-CM

## 2019-09-17 RX ORDER — DIAZEPAM 5 MG
5 TABLET ORAL ONCE
Qty: 1 TABLET | Refills: 0 | Status: SHIPPED | OUTPATIENT
Start: 2019-09-17 | End: 2019-11-19

## 2019-09-17 NOTE — PROGRESS NOTES
Had Dr. Elizabeth hand-sign script and faxed to [facility]. Pt aware and in agreement with trying valium prior to MRI.  Licha Navarro RN

## 2019-09-18 ENCOUNTER — TELEPHONE (OUTPATIENT)
Dept: INFECTIOUS DISEASES | Facility: CLINIC | Age: 49
End: 2019-09-18

## 2019-09-18 NOTE — TELEPHONE ENCOUNTER
Called back pharmacist from [facility] in [place], and verified Dr. Elizabeth's RENETTA number and NPI number.    Shelia Grier RN

## 2019-09-18 NOTE — TELEPHONE ENCOUNTER
[facility] Call Center    Phone Message    May a detailed message be left on voicemail: no    Reason for Call: Medication Question or concern regarding medication   Prescription Clarification  Name of Medication: diazepam (VALIUM) 5 MG tablet  Prescribing Provider: Dr. Elizabeth   Pharmacy: [facility]    What on the order needs clarification? Pharmacy needing call back to very [facility] number for Dr. Elizabeth          Action Taken: Message routed to:  [facility] ([facility]): ID

## 2019-09-18 NOTE — PROGRESS NOTES
This is a recent snapshot of the patient's [facility] medical record.  For current drug dose and complete information and questions, call [phone] or In [facility] pool, fv home infusion ([id number])  CSN Number:  [id number]

## 2019-09-20 ENCOUNTER — DOCUMENTATION ONLY (OUTPATIENT)
Dept: INFECTIOUS DISEASES | Facility: CLINIC | Age: 49
End: 2019-09-20

## 2019-09-20 NOTE — PROGRESS NOTES
[facility] labs (9/16/19):    - WBC: 6.5; H/H: 12.9/39.1; Plat: 221  - BUN: 11; Cr: 0.59  - Alk phos: 88; ALT: 7; AST: 14  - CRP: 10.2 (trending down)  - ESR: 15 (normalized)

## 2019-09-23 LAB
ALBUMIN SERPL-MCNC: 3.5 G/DL (ref 3.4–5)
ALP SERPL-CCNC: 86 U/L (ref 40–150)
ALT SERPL W P-5'-P-CCNC: 8 U/L (ref 0–50)
ANION GAP SERPL CALCULATED.3IONS-SCNC: 3 MMOL/L (ref 3–14)
AST SERPL W P-5'-P-CCNC: 18 U/L (ref 0–45)
BASOPHILS # BLD AUTO: 0.1 10E9/L (ref 0–0.2)
BASOPHILS NFR BLD AUTO: 0.8 %
BILIRUB DIRECT SERPL-MCNC: <0.1 MG/DL (ref 0–0.2)
BILIRUB SERPL-MCNC: 0.2 MG/DL (ref 0.2–1.3)
BUN SERPL-MCNC: 9 MG/DL (ref 7–30)
CALCIUM SERPL-MCNC: 8.8 MG/DL (ref 8.5–10.1)
CHLORIDE SERPL-SCNC: 106 MMOL/L (ref 94–109)
CO2 SERPL-SCNC: 31 MMOL/L (ref 20–32)
CREAT SERPL-MCNC: 0.57 MG/DL (ref 0.52–1.04)
CRP SERPL-MCNC: 15.4 MG/L (ref 0–8)
DIFFERENTIAL METHOD BLD: NORMAL
EOSINOPHIL # BLD AUTO: 0.3 10E9/L (ref 0–0.7)
EOSINOPHIL NFR BLD AUTO: 4.4 %
ERYTHROCYTE [DISTWIDTH] IN BLOOD BY AUTOMATED COUNT: 13.6 % (ref 10–15)
ERYTHROCYTE [SEDIMENTATION RATE] IN BLOOD BY WESTERGREN METHOD: 15 MM/H (ref 0–20)
GFR SERPL CREATININE-BSD FRML MDRD: >90 ML/MIN/{1.73_M2}
GLUCOSE SERPL-MCNC: 96 MG/DL (ref 70–99)
HCT VFR BLD AUTO: 38.9 % (ref 35–47)
HGB BLD-MCNC: 12.7 G/DL (ref 11.7–15.7)
IMM GRANULOCYTES # BLD: 0 10E9/L (ref 0–0.4)
IMM GRANULOCYTES NFR BLD: 0.5 %
LYMPHOCYTES # BLD AUTO: 1.4 10E9/L (ref 0.8–5.3)
LYMPHOCYTES NFR BLD AUTO: 21.9 %
MAGNESIUM SERPL-MCNC: 1.9 MG/DL (ref 1.6–2.3)
MCH RBC QN AUTO: 31.4 PG (ref 26.5–33)
MCHC RBC AUTO-ENTMCNC: 32.6 G/DL (ref 31.5–36.5)
MCV RBC AUTO: 96 FL (ref 78–100)
MONOCYTES # BLD AUTO: 0.5 10E9/L (ref 0–1.3)
MONOCYTES NFR BLD AUTO: 8.3 %
NEUTROPHILS # BLD AUTO: 4.1 10E9/L (ref 1.6–8.3)
NEUTROPHILS NFR BLD AUTO: 64.1 %
NRBC # BLD AUTO: 0 10*3/UL
NRBC BLD AUTO-RTO: 0 /100
PHOSPHATE SERPL-MCNC: 3 MG/DL (ref 2.5–4.5)
PLATELET # BLD AUTO: 220 10E9/L (ref 150–450)
POTASSIUM SERPL-SCNC: 3.5 MMOL/L (ref 3.4–5.3)
PROT SERPL-MCNC: 6.6 G/DL (ref 6.8–8.8)
RBC # BLD AUTO: 4.04 10E12/L (ref 3.8–5.2)
SODIUM SERPL-SCNC: 140 MMOL/L (ref 133–144)
TRIGL SERPL-MCNC: 68 MG/DL
WBC # BLD AUTO: 6.4 10E9/L (ref 4–11)

## 2019-09-23 PROCEDURE — 80053 COMPREHEN METABOLIC PANEL: CPT | Performed by: INTERNAL MEDICINE

## 2019-09-23 PROCEDURE — 83735 ASSAY OF MAGNESIUM: CPT | Performed by: INTERNAL MEDICINE

## 2019-09-23 PROCEDURE — 82248 BILIRUBIN DIRECT: CPT | Performed by: INTERNAL MEDICINE

## 2019-09-23 PROCEDURE — 85652 RBC SED RATE AUTOMATED: CPT | Performed by: INTERNAL MEDICINE

## 2019-09-23 PROCEDURE — 84478 ASSAY OF TRIGLYCERIDES: CPT | Performed by: INTERNAL MEDICINE

## 2019-09-23 PROCEDURE — 86140 C-REACTIVE PROTEIN: CPT | Performed by: INTERNAL MEDICINE

## 2019-09-23 PROCEDURE — 85025 COMPLETE CBC W/AUTO DIFF WBC: CPT | Performed by: INTERNAL MEDICINE

## 2019-09-23 PROCEDURE — 84100 ASSAY OF PHOSPHORUS: CPT | Performed by: INTERNAL MEDICINE

## 2019-09-24 ENCOUNTER — HOSPITAL ENCOUNTER (OUTPATIENT)
Dept: MRI IMAGING | Facility: CLINIC | Age: 49
Discharge: HOME | End: 2019-09-24
Attending: INTERNAL MEDICINE | Admitting: INTERNAL MEDICINE
Payer: COMMERCIAL

## 2019-09-24 ENCOUNTER — HOME INFUSION (PRE-WILLOW HOME INFUSION) (OUTPATIENT)
Dept: PHARMACY | Facility: CLINIC | Age: 49
End: 2019-09-24

## 2019-09-24 DIAGNOSIS — G06.1 SPINAL EPIDURAL ABSCESS: ICD-10-CM

## 2019-09-24 PROCEDURE — 72158 MRI LUMBAR SPINE W/O & W/DYE: CPT

## 2019-09-24 PROCEDURE — 25500064 ZZH RX 255 OP 636: Performed by: INTERNAL MEDICINE

## 2019-09-24 PROCEDURE — A9585 GADOBUTROL INJECTION: HCPCS | Performed by: INTERNAL MEDICINE

## 2019-09-24 RX ORDER — GADOBUTROL 604.72 MG/ML
11 INJECTION INTRAVENOUS ONCE
Status: COMPLETED | OUTPATIENT
Start: 2019-09-24 | End: 2019-09-24

## 2019-09-24 RX ADMIN — GADOBUTROL 11 ML: 604.72 INJECTION INTRAVENOUS at 09:46

## 2019-09-25 ENCOUNTER — OFFICE VISIT (OUTPATIENT)
Dept: INFECTIOUS DISEASES | Facility: CLINIC | Age: 49
End: 2019-09-25
Attending: INTERNAL MEDICINE
Payer: COMMERCIAL

## 2019-09-25 ENCOUNTER — HOME INFUSION (PRE-WILLOW HOME INFUSION) (OUTPATIENT)
Dept: PHARMACY | Facility: CLINIC | Age: 49
End: 2019-09-25

## 2019-09-25 ENCOUNTER — TELEPHONE (OUTPATIENT)
Dept: INFECTIOUS DISEASES | Facility: CLINIC | Age: 49
End: 2019-09-25

## 2019-09-25 VITALS
OXYGEN SATURATION: 96 % | SYSTOLIC BLOOD PRESSURE: 108 MMHG | WEIGHT: 241.7 LBS | DIASTOLIC BLOOD PRESSURE: 65 MMHG | TEMPERATURE: 98.2 F | HEART RATE: 80 BPM | BODY MASS INDEX: 42.82 KG/M2

## 2019-09-25 DIAGNOSIS — G06.1 SPINAL EPIDURAL ABSCESS: Primary | ICD-10-CM

## 2019-09-25 ASSESSMENT — PAIN SCALES - GENERAL: PAINLEVEL: NO PAIN (1)

## 2019-09-25 NOTE — NURSING NOTE
Chief Complaint   Patient presents with     RECHECK     follow up     /65 (BP Location: Left arm, Patient Position: Sitting, Cuff Size: Adult Regular)   Pulse 80   Temp 98.2  F (36.8  C)   Wt 109.6 kg (241 lb 11.2 oz)   LMP  (LMP Unknown)   SpO2 96%   BMI 42.82 kg/m        Keyon Cruz, EMT

## 2019-09-25 NOTE — LETTER
9/25/2019      RE: Zayra Ramirez  [address] [place] [address]          GENERAL ID CLINIC  Patient:  Zayra Ramirez, Date of birth 1970, Medical record number [id number]  Date of Visit:  9/25/2019  Requesting Provider: Elizabeth Elizabeth MD             Assessment and Recommendations:   Problem List:    # Increasing back pain after spinal cord stimulator removal + episode incontinence  of urinary and MRI showing  MSSA epidural abscess (T11-L1) related spinal cord stimulator implantation s/p cord stimulator removal, laminectomy and epidural abscess evacuation - epidural abscess cultures positive for MSSA     #  Chronic back pain s/p bilateral trial spinal cord stimulator on 8/8/19 (removed 8/13)     # RA on weekly methotrexate     # ILD on chronic O2     # Asthma    Discussion:    Recommendations:    Mrs. Zayra Ramirez  49 year old female hospitalized from 8/18/19 to 8/22/19. She has PMHx of  RA on weekly methotrexate, ILD on chronic O2 use, asthma, smoking history, HTN, morbid obesity, ROBERT, hysterectomy, and chronic back pain s/p bilateral trial spinal cord stimulator on 8/8/19 (removed 8/13 given worsening back pain). MRI lumbar spine was performed on 8/18 due to worsening back pain and was significant for posterior epidural abscess within the previous neurostimulator tract from approximately T11-L1, producing  moderate to severe spinal canal stenosis at the level of T12-L1 and compressing the conus medullaris. She also had urinary incontinence as well as leukocytosis and elevated inflammatory markers (white count was 14.3. CRP is 240 and ESR is 53). Neurosurgery evaluated the patient on 8/19 and proceeded with emergent OR exploration for laminectomy (at the level of T12) and epidural abscess evacuation. Blood cultures were obtained and vancomycin and cefepime were started. Per surgical report   there was overt purulence that was seen both at the cranial and caudal aspects of the incision.  Intraoperative cultures were obtained and became positive for MSSA. Blood cultures are negative. Based on culture results, antibiotics were narrowed  to cefazolin 2 grams IV q 8h on 8/21/19 with a plan for 6 weeks of antibiotics (last date on September 30) and repeat MRI spine close to the end of therapy.     In the current encounter patient feels significantly better and her labs show normal CBC, renal function, LFTs. In addition, ESR is normalized and  is significantly decreased. Follow up MRI lumbar spine performed yesterday (9/24/19) showed complete resolution of the epidural abscess and no evidence of residual epidural abscess status post evacuation of the T12-L2 laminectomy. She is receiving cefazolin through PICC line (home infusion). Her last day of antibiotic will be on September 30, 2019.        Recommendations:  1. I will plan to continue cefazolin 2 grams IV q8h until  September 30 (5 more days to complete 6 weeks) and then stop. Our nurse team called the home infusion and gave the instructions to discontinue antibiotics on September 30 after the last dose of that day and then remove the PICC line at that time.   2. In regards of restart of RA treatment (methotrexate), I'm ok with that. I will send a communication to Rheumatology team  3. Patient should continue to be followed by her primary care physician and rheumatologist  4. In addition, patient would like to have a follow up with Neurosurgery to assess her ability to come back to her job. I will send the referral      Elizabeth Elizabeth MD               History of Present Illness:   Mrs. Zayra Rmairez  49 year old female hospitalized from 8/18/19 to 8/22/19. She has PMHx of  RA on weekly methotrexate, ILD on chronic O2 use, asthma, smoking history, HTN, morbid obesity, ROBERT, hysterectomy, and chronic back pain s/p bilateral trial spinal cord stimulator on 8/8/19 (removed 8/13 given worsening back pain). MRI lumbar spine was performed on 8/18 due  to worsening back pain and was significant for posterior epidural abscess within the previous neurostimulator tract from approximately T11-L1, producing  moderate to severe spinal canal stenosis at the level of T12-L1 and compressing the conus medullaris. She also had urinary incontinence as well as leukocytosis and elevated inflammatory markers (white count was 14.3. CRP is 240 and ESR is 53). Neurosurgery evaluated the patient on 8/19 and proceeded with emergent OR exploration for laminectomy (at the level of T12) and epidural abscess evacuation. Blood cultures were obtained and vancomycin and cefepime were started. Per surgical report   there was overt purulence that was seen both at the cranial and caudal aspects of the incision. Intraoperative cultures were obtained and became positive for MSSA. Blood cultures are negative. Based on culture results, antibiotics were narrowed  to cefazolin 2 grams IV q 8h on 8/21/19 with a plan for 6 weeks of antibiotics (last date on September 30) and repeat MRI spine close to the end of therapy.    Current encounter:  Patient feels significantly better and her labs show normal CBC, renal function, LFTs. In addition, ESR is normalized and  is significantly decreased. Follow up MRI lumbar spine performed yesterday (9/24/19) showed complete resolution of the epidural abscess and no evidence of residual epidural abscess status post evacuation of the T12-L2 laminectomy. She is receiving cefazolin through PICC line (home infusion).               Review of Systems:     CONSTITUTIONAL:  No fevers or chills  INTEGUMENTARY/SKIN: NEGATIVE for worrisome rashes, moles or lesions  EYES: Negative for icterus, vision changes or irritation  ENT/MOUTH:  Negative for oral lesions and sore throat  RESPIRATORY:  Negative for cough and dyspnea  CARDIOVASCULAR:  Negative for chest pain, palpitations and  shortness of breath  GASTROINTESTINAL:  Negative for abdominal pain, nausea, vomiting,  diarrhea and constipation  GENITOURINARY:  Negative for dysuria, hematuria, frequency and urgency  MUSCULOSKELETAL: Negative for joint pain, swelling, motion restriction, negative for musculoskeletal pain  NEURO:  Negative for headache, altered mental status, numbness or weakness  PSYCHIATRIC: Negative for changes in mood or affect  HEMATOLOGIC/LYMPHATIC: negative for lymphadenopathy or bleeding  ALLERGIC/IMMUNOLOGIC: Negative for allergic reaction   ENDOCRINE: Negative for temperature intolerance, skin/hair changes         Past Medical History:     Past Medical History:   Diagnosis Date     Allergic rhinitis      Anemia      Arthritis      Asthma     copd     Dental abscess 8-2015     Depressive disorder      Gastroesophageal reflux disease      History of emphysema      Hoarseness      Hypertension      Morbid obesity with BMI of 40.0-44.9, adult (H)      Obstructive sleep apnea      Respiratory bronchiolitis interstitial lung disease (H)      Sleep apnea          Allergies:      Allergies   Allergen Reactions     Bee Venom Anaphylaxis     Bees      Doxycycline Anaphylaxis     Patient thinks it may have been just nausea and vomiting, however unable to confirm     Erythromycin Anaphylaxis and Shortness Of Breath     Other reaction(s): Vomiting     Hydrocodone-Acetaminophen Itching            Family History:   Reviewed and noncontributory.   Family History   Problem Relation Age of Onset     Other Cancer Father         base of tongue cancer at age ~65     Hypertension Father      Back Pain Father      Asthma Mother             Social History:     Social History     Socioeconomic History     Marital status: Single     Spouse name: Not on file     Number of children: Not on file     Years of education: Not on file     Highest education level: Not on file   Occupational History     Not on file   Social Needs     Financial resource strain: Not on file     Food insecurity:     Worry: Not on file     Inability: Not on  file     Transportation needs:     Medical: Not on file     Non-medical: Not on file   Tobacco Use     Smoking status: Current Every Day Smoker     Packs/day: 1.00     Years: 30.00     Pack years: 30.00     Types: Cigarettes     Start date: 1/1/1996     Smokeless tobacco: Never Used   Substance and Sexual Activity     Alcohol use: No     Binge frequency: Monthly     Drug use: No     Sexual activity: Never   Lifestyle     Physical activity:     Days per week: Not on file     Minutes per session: Not on file     Stress: Not on file   Relationships     Social connections:     Talks on phone: Not on file     Gets together: Not on file     Attends [employer] service: Not on file     Active member of club or organization: Not on file     Attends meetings of clubs or organizations: Not on file     Relationship status: Not on file     Intimate partner violence:     Fear of current or ex partner: Not on file     Emotionally abused: Not on file     Physically abused: Not on file     Forced sexual activity: Not on file   Other Topics Concern     Parent/sibling w/ CABG, MI or angioplasty before 65F 55M? Not Asked   Social History Narrative     Not on file              Physical Exam:   /65 (BP Location: Left arm, Patient Position: Sitting, Cuff Size: Adult Regular)   Pulse 80   Temp 98.2  F (36.8  C)   Wt 109.6 kg (241 lb 11.2 oz)   LMP  (LMP Unknown)   SpO2 96%   BMI 42.82 kg/m        Exam:  GENERAL:  Well-developed, well-nourished, not in acute distress.   HEAD: Normocephalic and atraumatic  ENT:  No hearing impairment, no ear pain or exudate, ear canals and TM's normal, nose and mouth without ulcers or lesions, oropharynx clear, oral mucous membranes moist, oropharynx is without exudates or ulcers.  EYES:  Eyes grossly normal to inspection, PERRL and conjunctivae and sclerae normal   NECK:  Supple, no adenopathy, no asymmetry, masses, or scars and thyroid normal to palpation  LUNGS:  Clear to auscultation - no  rales, rhonchi or wheezes  CARDIOVASCULAR:  Regular rate and rhythm, normal S1 S2, no S3 or S4, no murmur, click or rub, no peripheral edema and peripheral pulses strong  ABDOMEN:  Soft, nontender, no hepatosplenomegaly, no masses and bowel sounds normal  EXT: Extremities warm and without edema.  MS: No gross musculoskeletal defects noted, no edema  SKIN:  No acute rashes or suspicious lesions  NEUROLOGIC:  Grossly nonfocal. Normal strength and tone, mentation intact and speech normal  PSYCHIATRIC: Mood stable, mentation appears normal, affect normal  HEMATOLOGIC/LYMPHATIC: No lymphadenopathy or bleeding             Laboratory Data:     Creatinine   Date Value Ref Range Status   09/23/2019 0.57 0.52 - 1.04 mg/dL Final   09/16/2019 0.59 0.52 - 1.04 mg/dL Final   09/09/2019 0.71 0.52 - 1.04 mg/dL Final   09/03/2019 0.68 0.52 - 1.04 mg/dL Final   08/26/2019 0.58 0.52 - 1.04 mg/dL Final     WBC   Date Value Ref Range Status   09/23/2019 6.4 4.0 - 11.0 10e9/L Final   09/16/2019 6.5 4.0 - 11.0 10e9/L Final   09/09/2019 7.4 4.0 - 11.0 10e9/L Final   09/03/2019 7.7 4.0 - 11.0 10e9/L Final   08/26/2019 10.7 4.0 - 11.0 10e9/L Final     Hemoglobin   Date Value Ref Range Status   09/23/2019 12.7 11.7 - 15.7 g/dL Final     Platelet Count   Date Value Ref Range Status   09/23/2019 220 150 - 450 10e9/L Final     Lab Results   Component Value Date     09/23/2019    BUN 9 09/23/2019    CO2 31 09/23/2019     CRP Inflammation   Date Value Ref Range Status   09/23/2019 15.4 (H) 0.0 - 8.0 mg/L Final   09/16/2019 10.2 (H) 0.0 - 8.0 mg/L Final   09/09/2019 13.4 (H) 0.0 - 8.0 mg/L Final   09/03/2019 11.4 (H) 0.0 - 8.0 mg/L Final   08/26/2019 103.0 (H) 0.0 - 8.0 mg/L Final               Imaging:     Recent Results (from the past 48 hour(s))   MR Lumbar Spine w/o & w Contrast    Narrative    MRI LUMBAR SPINE WITHOUT AND WITH CONTRAST   9/24/2019 10:57 AM     HISTORY: Epidural abscess - status post surgical evacuation and  on  antibiotics. Needs follow-up MRI to assure resolution before stopping  antibiotics.    TECHNIQUE: Multiplanar multisequence MRI of the lumbar spine without  and with 11 mL Gadavist.    COMPARISON: 8/18/2019    FINDINGS: The report is dictated assuming five lumbar-type vertebral  bodies, and radiographic correlation may be necessary.     The distal spinal cord and cauda equina appear normal.  No abnormal  gadolinium enhancement is seen in the thecal sac or spinal cord. The  lower thoracic and upper lumbar epidural abscess has been completely  evacuated and there is no evidence of residual fluid collection.  Postoperative changes from bilateral laminectomy are present at T12-L2  with gadolinium enhancing tissue in the surgical bed consistent with  granulation tissue.    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 Spinal canal stenoses at L2-L3 and  L3-L4 are unchanged.      Impression    IMPRESSION: No evidence of residual epidural abscess status post  evacuation of the T12-L2 laminectomy.    MD Elizabeth JONES MD

## 2019-09-25 NOTE — PROGRESS NOTES
[facility]  Patient:  Zayra Ramirez, Date of birth 1970, Medical record number [id number]  Date of Visit:  9/25/2019  Requesting Provider: Elizabeth Elizabeth MD             Assessment and Recommendations:   Problem List:    # Increasing back pain after spinal cord stimulator removal + episode incontinence  of urinary and MRI showing  MSSA epidural abscess (T11-L1) related spinal cord stimulator implantation s/p cord stimulator removal, laminectomy and epidural abscess evacuation - epidural abscess cultures positive for MSSA     #  Chronic back pain s/p bilateral trial spinal cord stimulator on 8/8/19 (removed 8/13)     # RA on weekly methotrexate     # ILD on chronic O2     # Asthma    Discussion:    Recommendations:    Mrs. Zayra Ramirez  49 year old female hospitalized from 8/18/19 to 8/22/19. She has PMHx of  RA on weekly methotrexate, ILD on chronic O2 use, asthma, smoking history, HTN, morbid obesity, ROBERT, hysterectomy, and chronic back pain s/p bilateral trial spinal cord stimulator on 8/8/19 (removed 8/13 given worsening back pain). MRI lumbar spine was performed on 8/18 due to worsening back pain and was significant for posterior epidural abscess within the previous neurostimulator tract from approximately T11-L1, producing  moderate to severe spinal canal stenosis at the level of T12-L1 and compressing the conus medullaris. She also had urinary incontinence as well as leukocytosis and elevated inflammatory markers (white count was 14.3. CRP is 240 and ESR is 53). Neurosurgery evaluated the patient on 8/19 and proceeded with emergent OR exploration for laminectomy (at the level of T12) and epidural abscess evacuation. Blood cultures were obtained and vancomycin and cefepime were started. Per surgical report   there was overt purulence that was seen both at the cranial and caudal aspects of the incision. Intraoperative cultures were obtained and became positive for MSSA. Blood cultures are  negative. Based on culture results, antibiotics were narrowed  to cefazolin 2 grams IV q 8h on 8/21/19 with a plan for 6 weeks of antibiotics (last date on September 30) and repeat MRI spine close to the end of therapy.     In the current encounter patient feels significantly better and her labs show normal CBC, renal function, LFTs. In addition, ESR is normalized and  is significantly decreased. Follow up MRI lumbar spine performed yesterday (9/24/19) showed complete resolution of the epidural abscess and no evidence of residual epidural abscess status post evacuation of the T12-L2 laminectomy. She is receiving cefazolin through PICC line (home infusion). Her last day of antibiotic will be on September 30, 2019.        Recommendations:  1. I will plan to continue cefazolin 2 grams IV q8h until  September 30 (5 more days to complete 6 weeks) and then stop. Our nurse team called the home infusion and gave the instructions to discontinue antibiotics on September 30 after the last dose of that day and then remove the PICC line at that time.   2. In regards of restart of RA treatment (methotrexate), I'm ok with that. I will send a communication to Rheumatology team  3. Patient should continue to be followed by her primary care physician and rheumatologist  4. In addition, patient would like to have a follow up with Neurosurgery to assess her ability to come back to her job. I will send the referral      Elizabeth Elizabeth MD               History of Present Illness:   Mrs. Zayra Ramirez  49 year old female hospitalized from 8/18/19 to 8/22/19. She has PMHx of  RA on weekly methotrexate, ILD on chronic O2 use, asthma, smoking history, HTN, morbid obesity, ROBERT, hysterectomy, and chronic back pain s/p bilateral trial spinal cord stimulator on 8/8/19 (removed 8/13 given worsening back pain). MRI lumbar spine was performed on 8/18 due to worsening back pain and was significant for posterior epidural abscess within the  previous neurostimulator tract from approximately T11-L1, producing  moderate to severe spinal canal stenosis at the level of T12-L1 and compressing the conus medullaris. She also had urinary incontinence as well as leukocytosis and elevated inflammatory markers (white count was 14.3. CRP is 240 and ESR is 53). Neurosurgery evaluated the patient on 8/19 and proceeded with emergent OR exploration for laminectomy (at the level of T12) and epidural abscess evacuation. Blood cultures were obtained and vancomycin and cefepime were started. Per surgical report   there was overt purulence that was seen both at the cranial and caudal aspects of the incision. Intraoperative cultures were obtained and became positive for MSSA. Blood cultures are negative. Based on culture results, antibiotics were narrowed  to cefazolin 2 grams IV q 8h on 8/21/19 with a plan for 6 weeks of antibiotics (last date on September 30) and repeat MRI spine close to the end of therapy.    Current encounter:  Patient feels significantly better and her labs show normal CBC, renal function, LFTs. In addition, ESR is normalized and  is significantly decreased. Follow up MRI lumbar spine performed yesterday (9/24/19) showed complete resolution of the epidural abscess and no evidence of residual epidural abscess status post evacuation of the T12-L2 laminectomy. She is receiving cefazolin through PICC line (home infusion).               Review of Systems:     CONSTITUTIONAL:  No fevers or chills  INTEGUMENTARY/SKIN: NEGATIVE for worrisome rashes, moles or lesions  EYES: Negative for icterus, vision changes or irritation  ENT/MOUTH:  Negative for oral lesions and sore throat  RESPIRATORY:  Negative for cough and dyspnea  CARDIOVASCULAR:  Negative for chest pain, palpitations and  shortness of breath  GASTROINTESTINAL:  Negative for abdominal pain, nausea, vomiting, diarrhea and constipation  GENITOURINARY:  Negative for dysuria, hematuria, frequency and  urgency  MUSCULOSKELETAL: Negative for joint pain, swelling, motion restriction, negative for musculoskeletal pain  NEURO:  Negative for headache, altered mental status, numbness or weakness  PSYCHIATRIC: Negative for changes in mood or affect  HEMATOLOGIC/LYMPHATIC: negative for lymphadenopathy or bleeding  ALLERGIC/IMMUNOLOGIC: Negative for allergic reaction   ENDOCRINE: Negative for temperature intolerance, skin/hair changes         Past Medical History:     Past Medical History:   Diagnosis Date     Allergic rhinitis      Anemia      Arthritis      Asthma     copd     Dental abscess 8-2015     Depressive disorder      Gastroesophageal reflux disease      History of emphysema      Hoarseness      Hypertension      Morbid obesity with BMI of 40.0-44.9, adult (H)      Obstructive sleep apnea      Respiratory bronchiolitis interstitial lung disease (H)      Sleep apnea          Allergies:      Allergies   Allergen Reactions     Bee Venom Anaphylaxis     Bees      Doxycycline Anaphylaxis     Patient thinks it may have been just nausea and vomiting, however unable to confirm     Erythromycin Anaphylaxis and Shortness Of Breath     Other reaction(s): Vomiting     Hydrocodone-Acetaminophen Itching            Family History:   Reviewed and noncontributory.   Family History   Problem Relation Age of Onset     Other Cancer Father         base of tongue cancer at age ~65     Hypertension Father      Back Pain Father      Asthma Mother             Social History:     Social History     Socioeconomic History     Marital status: Single     Spouse name: Not on file     Number of children: Not on file     Years of education: Not on file     Highest education level: Not on file   Occupational History     Not on file   Social Needs     Financial resource strain: Not on file     Food insecurity:     Worry: Not on file     Inability: Not on file     Transportation needs:     Medical: Not on file     Non-medical: Not on file    Tobacco Use     Smoking status: Current Every Day Smoker     Packs/day: 1.00     Years: 30.00     Pack years: 30.00     Types: Cigarettes     Start date: 1/1/1996     Smokeless tobacco: Never Used   Substance and Sexual Activity     Alcohol use: No     Binge frequency: Monthly     Drug use: No     Sexual activity: Never   Lifestyle     Physical activity:     Days per week: Not on file     Minutes per session: Not on file     Stress: Not on file   Relationships     Social connections:     Talks on phone: Not on file     Gets together: Not on file     Attends Denominational service: Not on file     Active member of club or organization: Not on file     Attends meetings of clubs or organizations: Not on file     Relationship status: Not on file     Intimate partner violence:     Fear of current or ex partner: Not on file     Emotionally abused: Not on file     Physically abused: Not on file     Forced sexual activity: Not on file   Other Topics Concern     Parent/sibling w/ CABG, MI or angioplasty before 65F 55M? Not Asked   Social History Narrative     Not on file              Physical Exam:   /65 (BP Location: Left arm, Patient Position: Sitting, Cuff Size: Adult Regular)   Pulse 80   Temp 98.2  F (36.8  C)   Wt 109.6 kg (241 lb 11.2 oz)   LMP  (LMP Unknown)   SpO2 96%   BMI 42.82 kg/m       Exam:  GENERAL:  Well-developed, well-nourished, not in acute distress.   HEAD: Normocephalic and atraumatic  ENT:  No hearing impairment, no ear pain or exudate, ear canals and TM's normal, nose and mouth without ulcers or lesions, oropharynx clear, oral mucous membranes moist, oropharynx is without exudates or ulcers.  EYES:  Eyes grossly normal to inspection, PERRL and conjunctivae and sclerae normal   NECK:  Supple, no adenopathy, no asymmetry, masses, or scars and thyroid normal to palpation  LUNGS:  Clear to auscultation - no rales, rhonchi or wheezes  CARDIOVASCULAR:  Regular rate and rhythm, normal S1 S2, no S3  or S4, no murmur, click or rub, no peripheral edema and peripheral pulses strong  ABDOMEN:  Soft, nontender, no hepatosplenomegaly, no masses and bowel sounds normal  EXT: Extremities warm and without edema.  MS: No gross musculoskeletal defects noted, no edema  SKIN:  No acute rashes or suspicious lesions  NEUROLOGIC:  Grossly nonfocal. Normal strength and tone, mentation intact and speech normal  PSYCHIATRIC: Mood stable, mentation appears normal, affect normal  HEMATOLOGIC/LYMPHATIC: No lymphadenopathy or bleeding             Laboratory Data:     Creatinine   Date Value Ref Range Status   09/23/2019 0.57 0.52 - 1.04 mg/dL Final   09/16/2019 0.59 0.52 - 1.04 mg/dL Final   09/09/2019 0.71 0.52 - 1.04 mg/dL Final   09/03/2019 0.68 0.52 - 1.04 mg/dL Final   08/26/2019 0.58 0.52 - 1.04 mg/dL Final     WBC   Date Value Ref Range Status   09/23/2019 6.4 4.0 - 11.0 10e9/L Final   09/16/2019 6.5 4.0 - 11.0 10e9/L Final   09/09/2019 7.4 4.0 - 11.0 10e9/L Final   09/03/2019 7.7 4.0 - 11.0 10e9/L Final   08/26/2019 10.7 4.0 - 11.0 10e9/L Final     Hemoglobin   Date Value Ref Range Status   09/23/2019 12.7 11.7 - 15.7 g/dL Final     Platelet Count   Date Value Ref Range Status   09/23/2019 220 150 - 450 10e9/L Final     Lab Results   Component Value Date     09/23/2019    BUN 9 09/23/2019    CO2 31 09/23/2019     CRP Inflammation   Date Value Ref Range Status   09/23/2019 15.4 (H) 0.0 - 8.0 mg/L Final   09/16/2019 10.2 (H) 0.0 - 8.0 mg/L Final   09/09/2019 13.4 (H) 0.0 - 8.0 mg/L Final   09/03/2019 11.4 (H) 0.0 - 8.0 mg/L Final   08/26/2019 103.0 (H) 0.0 - 8.0 mg/L Final               Imaging:     Recent Results (from the past 48 hour(s))   MR Lumbar Spine w/o & w Contrast    Narrative    MRI LUMBAR SPINE WITHOUT AND WITH CONTRAST   9/24/2019 10:57 AM     HISTORY: Epidural abscess - status post surgical evacuation and on  antibiotics. Needs follow-up MRI to assure resolution before stopping  antibiotics.    TECHNIQUE:  Multiplanar multisequence MRI of the lumbar spine without  and with 11 mL Gadavist.    COMPARISON: 8/18/2019    FINDINGS: The report is dictated assuming five lumbar-type vertebral  bodies, and radiographic correlation may be necessary.     The distal spinal cord and cauda equina appear normal.  No abnormal  gadolinium enhancement is seen in the thecal sac or spinal cord. The  lower thoracic and upper lumbar epidural abscess has been completely  evacuated and there is no evidence of residual fluid collection.  Postoperative changes from bilateral laminectomy are present at T12-L2  with gadolinium enhancing tissue in the surgical bed consistent with  granulation tissue.    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 Spinal canal stenoses at L2-L3 and  L3-L4 are unchanged.      Impression    IMPRESSION: No evidence of residual epidural abscess status post  evacuation of the T12-L2 laminectomy.    MIKE SULLIVAN MD

## 2019-09-25 NOTE — PROGRESS NOTES
This is a recent snapshot of the patient's [facility] medical record.  For current drug dose and complete information and questions, call [phone] or In Basket pool, [facility] home infusion ([id number])  CSN Number:  [id number]

## 2019-09-25 NOTE — TELEPHONE ENCOUNTER
Per message below with Dr. Eilzabeth, writer let VCM for pharmacist at [facility] that patient's last dose of IV cefazolin will be on 09/30/2019 at 10:00pm and they can remove her PICC after her last dose.    Shelia Grier RN  --------------------------------------------------------    Elizabeth Elizabeth MD P [facility]             Lihca Augustine and Shelia,     I saw Mrs. Ramirez today and her last day of her antibiotic (cefazolin) will be on September 30 (Monday). Since she is on a q 8h dose schedule, her last dose of cefazolin will be on September 30 at 10:00pm. Can you inform her home infusion company (she told me it is [facility]) that her last day of antibiotic will be on September 30 and that PICC should be removed either on September 30 after the last dose or the day after (October 1)?     Thank you very much     Elizabeth

## 2019-09-25 NOTE — LETTER
9/25/2019     RE: Zayra Ramirez  [address] [place] [address]     Dear Colleague,    Thank you for referring your patient, Zayra Ramirez, to the [facility] AND INFECTIOUS DISEASES at [facility]. Please see a copy of my visit note below.       GENERAL ID CLINIC  Patient:  Zayra Ramirez, Date of birth 1970, Medical record number [id number]  Date of Visit:  9/25/2019  Requesting Provider: Elizabeth Elizabeth MD             Assessment and Recommendations:   Problem List:    # Increasing back pain after spinal cord stimulator removal + episode incontinence  of urinary and MRI showing  MSSA epidural abscess (T11-L1) related spinal cord stimulator implantation s/p cord stimulator removal, laminectomy and epidural abscess evacuation - epidural abscess cultures positive for MSSA     #  Chronic back pain s/p bilateral trial spinal cord stimulator on 8/8/19 (removed 8/13)     # RA on weekly methotrexate     # ILD on chronic O2     # Asthma    Discussion:    Recommendations:    Mrs. Zayra Ramirez  49 year old female hospitalized from 8/18/19 to 8/22/19. She has PMHx of  RA on weekly methotrexate, ILD on chronic O2 use, asthma, smoking history, HTN, morbid obesity, ROBERT, hysterectomy, and chronic back pain s/p bilateral trial spinal cord stimulator on 8/8/19 (removed 8/13 given worsening back pain). MRI lumbar spine was performed on 8/18 due to worsening back pain and was significant for posterior epidural abscess within the previous neurostimulator tract from approximately T11-L1, producing  moderate to severe spinal canal stenosis at the level of T12-L1 and compressing the conus medullaris. She also had urinary incontinence as well as leukocytosis and elevated inflammatory markers (white count was 14.3. CRP is 240 and ESR is 53). Neurosurgery evaluated the patient on 8/19 and proceeded with emergent OR exploration for laminectomy (at the level of  T12) and epidural abscess evacuation. Blood cultures were obtained and vancomycin and cefepime were started. Per surgical report   there was overt purulence that was seen both at the cranial and caudal aspects of the incision. Intraoperative cultures were obtained and became positive for MSSA. Blood cultures are negative. Based on culture results, antibiotics were narrowed  to cefazolin 2 grams IV q 8h on 8/21/19 with a plan for 6 weeks of antibiotics (last date on September 30) and repeat MRI spine close to the end of therapy.     In the current encounter patient feels significantly better and her labs show normal CBC, renal function, LFTs. In addition, ESR is normalized and  is significantly decreased. Follow up MRI lumbar spine performed yesterday (9/24/19) showed complete resolution of the epidural abscess and no evidence of residual epidural abscess status post evacuation of the T12-L2 laminectomy. She is receiving cefazolin through PICC line (home infusion). Her last day of antibiotic will be on September 30, 2019.        Recommendations:  1. I will plan to continue cefazolin 2 grams IV q8h until  September 30 (5 more days to complete 6 weeks) and then stop. Our nurse team called the home infusion and gave the instructions to discontinue antibiotics on September 30 after the last dose of that day and then remove the PICC line at that time.   2. In regards of restart of RA treatment (methotrexate), I'm ok with that. I will send a communication to Rheumatology team  3. Patient should continue to be followed by her primary care physician and rheumatologist  4. In addition, patient would like to have a follow up with Neurosurgery to assess her ability to come back to her job. I will send the referral      Elizabeth Elizabeth MD               History of Present Illness:   Mrs. Zayra Ramirez  49 year old female hospitalized from 8/18/19 to 8/22/19. She has PMHx of  RA on weekly methotrexate, ILD on chronic O2  use, asthma, smoking history, HTN, morbid obesity, ROBERT, hysterectomy, and chronic back pain s/p bilateral trial spinal cord stimulator on 8/8/19 (removed 8/13 given worsening back pain). MRI lumbar spine was performed on 8/18 due to worsening back pain and was significant for posterior epidural abscess within the previous neurostimulator tract from approximately T11-L1, producing  moderate to severe spinal canal stenosis at the level of T12-L1 and compressing the conus medullaris. She also had urinary incontinence as well as leukocytosis and elevated inflammatory markers (white count was 14.3. CRP is 240 and ESR is 53). Neurosurgery evaluated the patient on 8/19 and proceeded with emergent OR exploration for laminectomy (at the level of T12) and epidural abscess evacuation. Blood cultures were obtained and vancomycin and cefepime were started. Per surgical report   there was overt purulence that was seen both at the cranial and caudal aspects of the incision. Intraoperative cultures were obtained and became positive for MSSA. Blood cultures are negative. Based on culture results, antibiotics were narrowed  to cefazolin 2 grams IV q 8h on 8/21/19 with a plan for 6 weeks of antibiotics (last date on September 30) and repeat MRI spine close to the end of therapy.    Current encounter:  Patient feels significantly better and her labs show normal CBC, renal function, LFTs. In addition, ESR is normalized and  is significantly decreased. Follow up MRI lumbar spine performed yesterday (9/24/19) showed complete resolution of the epidural abscess and no evidence of residual epidural abscess status post evacuation of the T12-L2 laminectomy. She is receiving cefazolin through PICC line (home infusion).               Review of Systems:     CONSTITUTIONAL:  No fevers or chills  INTEGUMENTARY/SKIN: NEGATIVE for worrisome rashes, moles or lesions  EYES: Negative for icterus, vision changes or irritation  ENT/MOUTH:  Negative for  oral lesions and sore throat  RESPIRATORY:  Negative for cough and dyspnea  CARDIOVASCULAR:  Negative for chest pain, palpitations and  shortness of breath  GASTROINTESTINAL:  Negative for abdominal pain, nausea, vomiting, diarrhea and constipation  GENITOURINARY:  Negative for dysuria, hematuria, frequency and urgency  MUSCULOSKELETAL: Negative for joint pain, swelling, motion restriction, negative for musculoskeletal pain  NEURO:  Negative for headache, altered mental status, numbness or weakness  PSYCHIATRIC: Negative for changes in mood or affect  HEMATOLOGIC/LYMPHATIC: negative for lymphadenopathy or bleeding  ALLERGIC/IMMUNOLOGIC: Negative for allergic reaction   ENDOCRINE: Negative for temperature intolerance, skin/hair changes         Past Medical History:     Past Medical History:   Diagnosis Date     Allergic rhinitis      Anemia      Arthritis      Asthma     copd     Dental abscess 8-2015     Depressive disorder      Gastroesophageal reflux disease      History of emphysema      Hoarseness      Hypertension      Morbid obesity with BMI of 40.0-44.9, adult (H)      Obstructive sleep apnea      Respiratory bronchiolitis interstitial lung disease (H)      Sleep apnea          Allergies:      Allergies   Allergen Reactions     Bee Venom Anaphylaxis     Bees      Doxycycline Anaphylaxis     Patient thinks it may have been just nausea and vomiting, however unable to confirm     Erythromycin Anaphylaxis and Shortness Of Breath     Other reaction(s): Vomiting     Hydrocodone-Acetaminophen Itching            Family History:   Reviewed and noncontributory.   Family History   Problem Relation Age of Onset     Other Cancer Father         base of tongue cancer at age ~65     Hypertension Father      Back Pain Father      Asthma Mother             Social History:     Social History     Socioeconomic History     Marital status: Single     Spouse name: Not on file     Number of children: Not on file     Years of  education: Not on file     Highest education level: Not on file   Occupational History     Not on file   Social Needs     Financial resource strain: Not on file     Food insecurity:     Worry: Not on file     Inability: Not on file     Transportation needs:     Medical: Not on file     Non-medical: Not on file   Tobacco Use     Smoking status: Current Every Day Smoker     Packs/day: 1.00     Years: 30.00     Pack years: 30.00     Types: Cigarettes     Start date: 1/1/1996     Smokeless tobacco: Never Used   Substance and Sexual Activity     Alcohol use: No     Binge frequency: Monthly     Drug use: No     Sexual activity: Never   Lifestyle     Physical activity:     Days per week: Not on file     Minutes per session: Not on file     Stress: Not on file   Relationships     Social connections:     Talks on phone: Not on file     Gets together: Not on file     Attends Alevism service: Not on file     Active member of club or organization: Not on file     Attends meetings of clubs or organizations: Not on file     Relationship status: Not on file     Intimate partner violence:     Fear of current or ex partner: Not on file     Emotionally abused: Not on file     Physically abused: Not on file     Forced sexual activity: Not on file   Other Topics Concern     Parent/sibling w/ CABG, MI or angioplasty before 65F 55M? Not Asked   Social History Narrative     Not on file              Physical Exam:   /65 (BP Location: Left arm, Patient Position: Sitting, Cuff Size: Adult Regular)   Pulse 80   Temp 98.2  F (36.8  C)   Wt 109.6 kg (241 lb 11.2 oz)   LMP  (LMP Unknown)   SpO2 96%   BMI 42.82 kg/m        Exam:  GENERAL:  Well-developed, well-nourished, not in acute distress.   HEAD: Normocephalic and atraumatic  ENT:  No hearing impairment, no ear pain or exudate, ear canals and TM's normal, nose and mouth without ulcers or lesions, oropharynx clear, oral mucous membranes moist, oropharynx is without exudates or  ulcers.  EYES:  Eyes grossly normal to inspection, PERRL and conjunctivae and sclerae normal   NECK:  Supple, no adenopathy, no asymmetry, masses, or scars and thyroid normal to palpation  LUNGS:  Clear to auscultation - no rales, rhonchi or wheezes  CARDIOVASCULAR:  Regular rate and rhythm, normal S1 S2, no S3 or S4, no murmur, click or rub, no peripheral edema and peripheral pulses strong  ABDOMEN:  Soft, nontender, no hepatosplenomegaly, no masses and bowel sounds normal  EXT: Extremities warm and without edema.  MS: No gross musculoskeletal defects noted, no edema  SKIN:  No acute rashes or suspicious lesions  NEUROLOGIC:  Grossly nonfocal. Normal strength and tone, mentation intact and speech normal  PSYCHIATRIC: Mood stable, mentation appears normal, affect normal  HEMATOLOGIC/LYMPHATIC: No lymphadenopathy or bleeding             Laboratory Data:     Creatinine   Date Value Ref Range Status   09/23/2019 0.57 0.52 - 1.04 mg/dL Final   09/16/2019 0.59 0.52 - 1.04 mg/dL Final   09/09/2019 0.71 0.52 - 1.04 mg/dL Final   09/03/2019 0.68 0.52 - 1.04 mg/dL Final   08/26/2019 0.58 0.52 - 1.04 mg/dL Final     WBC   Date Value Ref Range Status   09/23/2019 6.4 4.0 - 11.0 10e9/L Final   09/16/2019 6.5 4.0 - 11.0 10e9/L Final   09/09/2019 7.4 4.0 - 11.0 10e9/L Final   09/03/2019 7.7 4.0 - 11.0 10e9/L Final   08/26/2019 10.7 4.0 - 11.0 10e9/L Final     Hemoglobin   Date Value Ref Range Status   09/23/2019 12.7 11.7 - 15.7 g/dL Final     Platelet Count   Date Value Ref Range Status   09/23/2019 220 150 - 450 10e9/L Final     Lab Results   Component Value Date     09/23/2019    BUN 9 09/23/2019    CO2 31 09/23/2019     CRP Inflammation   Date Value Ref Range Status   09/23/2019 15.4 (H) 0.0 - 8.0 mg/L Final   09/16/2019 10.2 (H) 0.0 - 8.0 mg/L Final   09/09/2019 13.4 (H) 0.0 - 8.0 mg/L Final   09/03/2019 11.4 (H) 0.0 - 8.0 mg/L Final   08/26/2019 103.0 (H) 0.0 - 8.0 mg/L Final               Imaging:     Recent Results  (from the past 48 hour(s))   MR Lumbar Spine w/o & w Contrast    Narrative    MRI LUMBAR SPINE WITHOUT AND WITH CONTRAST   9/24/2019 10:57 AM     HISTORY: Epidural abscess - status post surgical evacuation and on  antibiotics. Needs follow-up MRI to assure resolution before stopping  antibiotics.    TECHNIQUE: Multiplanar multisequence MRI of the lumbar spine without  and with 11 mL Gadavist.    COMPARISON: 8/18/2019    FINDINGS: The report is dictated assuming five lumbar-type vertebral  bodies, and radiographic correlation may be necessary.     The distal spinal cord and cauda equina appear normal.  No abnormal  gadolinium enhancement is seen in the thecal sac or spinal cord. The  lower thoracic and upper lumbar epidural abscess has been completely  evacuated and there is no evidence of residual fluid collection.  Postoperative changes from bilateral laminectomy are present at T12-L2  with gadolinium enhancing tissue in the surgical bed consistent with  granulation tissue.    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 Spinal canal stenoses at L2-L3 and  L3-L4 are unchanged.      Impression    IMPRESSION: No evidence of residual epidural abscess status post  evacuation of the T12-L2 laminectomy.    MIKE SULLIVAN MD       Again, thank you for allowing me to participate in the care of your patient.      Sincerely,    Elizabeth Elizabeth MD

## 2019-09-26 NOTE — PROGRESS NOTES
This is a recent snapshot of the patient's [facility] medical record.  For current drug dose and complete information and questions, call [phone] or In Basket pool, [facility] home infusion ([id number])  CSN Number:  [id number]

## 2019-09-30 ENCOUNTER — HEALTH MAINTENANCE LETTER (OUTPATIENT)
Age: 49
End: 2019-09-30

## 2019-10-02 ENCOUNTER — HOME INFUSION (PRE-WILLOW HOME INFUSION) (OUTPATIENT)
Dept: PHARMACY | Facility: CLINIC | Age: 49
End: 2019-10-02

## 2019-10-04 NOTE — PROGRESS NOTES
This is a recent snapshot of the patient's [facility] medical record.  For current drug dose and complete information and questions, call [phone] or In Basket pool, [facility] home infusion ([id number])  CSN Number:  [id number]

## 2019-10-07 DIAGNOSIS — Z87.39 HISTORY OF SERONEGATIVE INFLAMMATORY ARTHRITIS: ICD-10-CM

## 2019-10-08 RX ORDER — PREDNISONE 5 MG/1
TABLET ORAL
Qty: 50 TABLET | Refills: 1 | Status: SHIPPED | OUTPATIENT
Start: 2019-10-08 | End: 2020-01-23

## 2019-10-08 NOTE — TELEPHONE ENCOUNTER
predniSONE (DELTASONE) 5 MG tablet       Last Written Prescription Date:  4/23/19  Last Fill Quantity: 50,   # refills: 1  Last Office Visit : 9/13/19  Future Office visit:  11/26/19    Routing refill request to provider for review/approval because:  Drug not active on patient's medication list

## 2019-11-04 ENCOUNTER — TELEPHONE (OUTPATIENT)
Dept: ANESTHESIOLOGY | Facility: CLINIC | Age: 49
End: 2019-11-04

## 2019-11-04 ENCOUNTER — OFFICE VISIT (OUTPATIENT)
Dept: ANESTHESIOLOGY | Facility: CLINIC | Age: 49
End: 2019-11-04
Payer: COMMERCIAL

## 2019-11-04 VITALS
OXYGEN SATURATION: 93 % | HEIGHT: 64 IN | HEART RATE: 72 BPM | SYSTOLIC BLOOD PRESSURE: 114 MMHG | BODY MASS INDEX: 43.19 KG/M2 | DIASTOLIC BLOOD PRESSURE: 78 MMHG | RESPIRATION RATE: 16 BRPM | WEIGHT: 253 LBS

## 2019-11-04 DIAGNOSIS — M47.816 LUMBAR SPONDYLOSIS: Primary | ICD-10-CM

## 2019-11-04 RX ORDER — LIDOCAINE 40 MG/G
CREAM TOPICAL
Status: CANCELLED | OUTPATIENT
Start: 2019-11-04

## 2019-11-04 ASSESSMENT — ANXIETY QUESTIONNAIRES
GAD7 TOTAL SCORE: 6
GAD7 TOTAL SCORE: 6
1. FEELING NERVOUS, ANXIOUS, OR ON EDGE: SEVERAL DAYS
5. BEING SO RESTLESS THAT IT IS HARD TO SIT STILL: SEVERAL DAYS
2. NOT BEING ABLE TO STOP OR CONTROL WORRYING: SEVERAL DAYS
7. FEELING AFRAID AS IF SOMETHING AWFUL MIGHT HAPPEN: SEVERAL DAYS
3. WORRYING TOO MUCH ABOUT DIFFERENT THINGS: SEVERAL DAYS
4. TROUBLE RELAXING: SEVERAL DAYS
7. FEELING AFRAID AS IF SOMETHING AWFUL MIGHT HAPPEN: SEVERAL DAYS
6. BECOMING EASILY ANNOYED OR IRRITABLE: NOT AT ALL

## 2019-11-04 ASSESSMENT — ENCOUNTER SYMPTOMS
POLYDIPSIA: 0
DIARRHEA: 0
POSTURAL DYSPNEA: 0
WEIGHT GAIN: 0
JAUNDICE: 0
JOINT SWELLING: 1
BACK PAIN: 1
DEPRESSION: 1
NERVOUS/ANXIOUS: 1
PANIC: 0
DYSPNEA ON EXERTION: 1
WEIGHT LOSS: 0
HALLUCINATIONS: 0
HEARTBURN: 1
BLOATING: 0
FEVER: 0
RECTAL PAIN: 0
ALTERED TEMPERATURE REGULATION: 0
MUSCLE WEAKNESS: 0
NAUSEA: 0
INCREASED ENERGY: 0
WHEEZING: 1
STIFFNESS: 1
DECREASED APPETITE: 0
CONSTIPATION: 0
MUSCLE CRAMPS: 0
MYALGIAS: 1
COUGH DISTURBING SLEEP: 0
POLYPHAGIA: 0
SPUTUM PRODUCTION: 1
CHILLS: 0
NIGHT SWEATS: 0
ARTHRALGIAS: 1
NECK PAIN: 1
SHORTNESS OF BREATH: 1
SNORES LOUDLY: 1
BOWEL INCONTINENCE: 0
COUGH: 0
VOMITING: 0
DECREASED CONCENTRATION: 0
HEMOPTYSIS: 0
ABDOMINAL PAIN: 0
BLOOD IN STOOL: 0
FATIGUE: 1
INSOMNIA: 0

## 2019-11-04 ASSESSMENT — MIFFLIN-ST. JEOR: SCORE: 1762.6

## 2019-11-04 ASSESSMENT — PAIN SCALES - GENERAL: PAINLEVEL: MILD PAIN (3)

## 2019-11-04 NOTE — LETTER
"11/4/2019       RE: Zayra Ramirez  [address] [place] [address]     Dear Colleague,    Thank you for referring your patient, Zayra Ramirez, to the [facility] FOR COMPREHENSIVE PAIN MANAGEMENT at [facility]. Please see a copy of my visit note below.      Pain Clinic New Patient Consult Note:    Referring Provider: Alverto   Primary care provider: Adam Del Toro.    Zayra Ramirez is a 49 year old y.o. old female who presents to the pain clinic with low back pain.     HPI:  Patient is a 49 year old female with PMH of depression,  RA on methotrexate, ILD on chronic oxygen, ROBERT who presents for evaluation of low back pain. Patient has had chronic low back pain for many years now.  She is s/p spinal cord stimulator trial (b/l entrance at L1-2 interlaminar space with placement of leads above superior endplate T7 bilaterally) on 8/8/19 with removal on 8/13/19 due to increased back pain.  At the time, she also had episode of incontinence. She was found with a T11-L1 epidural abscess.  She then had stimulator removed on 8/19/19 with T12-L2 laminectomy and abscess evacuation.  She was treated with IV antibiotics (cefazolin) for total of 6 weeks for this infection.     Her pain is primarily right low back. She has been dealing with back pain for 30 years.  No particular inciting event.  It radiates to the right anterolateral thigh. This pain is constant. The pain is worsened with sitting too long, walking too long.  No particular relieving positions.  For the pain, she takes naproxen 400 BID with uncertain relief. She was previously on gabapentin for mental health and stopped for \"mental reasons.\" She also takes cymbalta 120mg which she has been on for a few years. She states that she noticed a benefit with this at the beginning.  She has had b/l RFA greater than 5 times with significant relief of the pain in the past.  Last time was earlier this year.  She has " also had epidural steroid injections many times (>10) with most recent being earlier this year. She has had this done at [facility] and at [facility].  She previously did PT, has done this on and off for 30 years.  She does home exercises which are mostly stretching exercises.  She has a traction machine at home. She saw a chiropractor many years ago until they decided that they werent able to help her anymore.  She has never had surgery of her low back.  She has never had acupuncture for her back.      Pain treatments:    Physical therapy: Patient is compliant with home stretching routine. She had done PT on and off for the past 20 years  Chiropractor: Chiropractic treatment 20 years ago  Pain physician: Previously seeing [facility] and [facility]  Surgery: Recent T11-L1 laminectomy for abscess drainage, decompression  Acupuncture: N/A    Tests/Imaging reviewed with the patient:    MRI lumbar spine 9/24/19:  Multilevel degenerative disc disease, degenerative facet arthropathy as well as congential fusion at T12-L1.  Spinal stenosis at L2-3 and L3-4  No evidence of residual epidural abscess s/p T12-L2 laminectomy    Significant Medical History:   Past Medical History:   Diagnosis Date     Allergic rhinitis      Anemia      Arthritis      Asthma     copd     Dental abscess 8-2015     Depressive disorder      Gastroesophageal reflux disease      History of emphysema      Hoarseness      Hypertension      Morbid obesity with BMI of 40.0-44.9, adult (H)      Obstructive sleep apnea      Respiratory bronchiolitis interstitial lung disease (H)      Sleep apnea           Past Surgical History:  Past Surgical History:   Procedure Laterality Date     COLONOSCOPY       ENT SURGERY       EXCISE LESION INTRAORAL Bilateral 10/3/2018    Procedure: EXCISE LESION INTRAORAL;  Wide Local Excision Of of Left Oral Cavity Ulcer;  Surgeon: Morro Mijares MD;  Location: [facility] OR      DRAIN SKIN ABSCESS SIMPLE/SINGLE  3/16/2012     Procedure:INCISION AND DRAINAGE, ABSCESS, SIMPLE; Surgeon:CHRISTIANO HANCOCK; Location: GI     HEAD & NECK SURGERY       HYSTERECTOMY       INCISION AND DRAINAGE ABDOMEN WASHOUT, COMBINED       LAMINECTOMY THORACIC ONE LEVEL N/A 8/19/2019    Procedure: LAMINECTOMY, SPINE, THORACIC, 11-12 and Part of Lumbar 1, DRAINAGE OF EPIDURAL ABCESS, Epidural Drain Placement X 2;  Surgeon: Yadiel Beal MD;  Location: [facility] OR          Family History:  Family History   Problem Relation Age of Onset     Other Cancer Father         base of tongue cancer at age ~65     Hypertension Father      Back Pain Father      Asthma Mother           Social History:  Social History     Socioeconomic History     Marital status: Single     Spouse name: Not on file     Number of children: Not on file     Years of education: Not on file     Highest education level: Not on file   Occupational History     Not on file   Social Needs     Financial resource strain: Not on file     Food insecurity:     Worry: Not on file     Inability: Not on file     Transportation needs:     Medical: Not on file     Non-medical: Not on file   Tobacco Use     Smoking status: Current Every Day Smoker     Packs/day: 1.00     Years: 30.00     Pack years: 30.00     Types: Cigarettes     Start date: 1/1/1996     Smokeless tobacco: Never Used   Substance and Sexual Activity     Alcohol use: No     Binge frequency: Monthly     Drug use: No     Sexual activity: Never   Lifestyle     Physical activity:     Days per week: Not on file     Minutes per session: Not on file     Stress: Not on file   Relationships     Social connections:     Talks on phone: Not on file     Gets together: Not on file     Attends Religion service: Not on file     Active member of club or organization: Not on file     Attends meetings of clubs or organizations: Not on file     Relationship status: Not on file     Intimate partner violence:     Fear of current or ex partner: Not on file      Emotionally abused: Not on file     Physically abused: Not on file     Forced sexual activity: Not on file   Other Topics Concern     Parent/sibling w/ CABG, MI or angioplasty before 65F 55M? Not Asked   Social History Narrative     Not on file     Social History     Patient does not qualify to have social determinant information on file (likely too young).   Social History Narrative     Not on file          Allergies:  Allergies   Allergen Reactions     Bee Venom Anaphylaxis     Bees      Doxycycline Anaphylaxis     Patient thinks it may have been just nausea and vomiting, however unable to confirm     Erythromycin Anaphylaxis and Shortness Of Breath     Other reaction(s): Vomiting     Hydrocodone-Acetaminophen Itching       Current Medications:   Current Outpatient Medications   Medication Sig Dispense Refill     acetaminophen (TYLENOL) 325 MG tablet Take 2 tablets (650 mg) by mouth every 4 hours as needed for other (multimodal surgical pain management along with NSAIDS and opioid medication as indicated based on pain control and physical function.)       albuterol (PROAIR HFA, PROVENTIL HFA, VENTOLIN HFA) 108 (90 BASE) MCG/ACT inhaler Inhale 2 puffs into the lungs every 6 hours as needed for shortness of breath / dyspnea or wheezing       ARIPiprazole (ABILIFY) 15 MG tablet Take 15 mg by mouth daily        ceFAZolin (ANCEF) intermittent infusion 2 g in 100mL NS (pre-mix) Inject 100 mLs (2 g) into the vein every 8 hours 30 each 11     cholecalciferol ( ULTRA STRENGTH) 2000 units CAPS TAKE ONE CAPSULE BY MOUTH DAILY IN AM       cyclobenzaprine (FLEXERIL) 10 MG tablet Take 1 tablet (10 mg) by mouth 3 times daily 30 tablet 0     DULoxetine (CYMBALTA) 60 MG capsule Take 120 mg by mouth daily        ferrous sulfate (FEROSUL) 325 (65 Fe) MG tablet Take 325 mg by mouth daily  0     fluticasone-salmeterol (ADVAIR) 500-50 MCG/DOSE diskus inhaler Inhale 1 puff into the lungs every 12 hours       folic acid (FOLVITE) 1  MG tablet Take 1 tablet (1 mg) by mouth daily 90 tablet 3     hydrOXYzine (ATARAX) 50 MG tablet Take 1 tab 30 minutes prior to MRI, take additional tab at time of MRI if still anxious (Patient not taking: Reported on 9/25/2019) 2 tablet 0     lisinopril-hydrochlorothiazide (PRINZIDE/ZESTORETIC) 20-25 MG per tablet Take 1 tablet by mouth daily 30 tablet 0     methotrexate 2.5 MG tablet Take 9 tabs once weekly 96 tablet 2     montelukast (SINGULAIR) 10 MG tablet Take 10 mg by mouth At Bedtime       oxyCODONE (ROXICODONE) 5 MG tablet Take 1-2 tablets (5-10 mg) by mouth every 3 hours as needed (Patient not taking: Reported on 9/13/2019) 45 tablet 0     OXYGEN-HELIUM IN 2-3L PRN during the day, 3L @ night       predniSONE (DELTASONE) 5 MG tablet TAKE 3 TABLETS BY MOUTH DAILY FOR 10 DAYS, THEN 2 TABS DAILY FOR 10 DAYS, THEN OFF. 50 tablet 1     rOPINIRole (REQUIP) 2 MG tablet Take 2 mg by mouth nightly as needed        senna-docusate (SENOKOT-S/PERICOLACE) 8.6-50 MG tablet Take 1 tablet by mouth 2 times daily (Patient not taking: Reported on 9/13/2019) 45 tablet 0     vitamin C 500 MG TABS Take 1 tablet (500 mg) by mouth 2 times daily       zinc sulfate (ZINCATE) 220 (50 Zn) MG capsule Take 1 capsule (220 mg) by mouth daily            Current Pain Medications:  Medications related to Pain Management (From now, onward)    None           Past Pain Medications:  Trialed gabapentin in the past for mood disorder.  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   Respiratory: Positive for sputum  production, shortness of breath and wheezing. Negative for cough and hemoptysis.    Gastrointestinal: Positive for heartburn. Negative for abdominal pain, blood in stool, constipation, diarrhea, melena, nausea and vomiting.   Musculoskeletal: Positive for back pain, myalgias and neck pain.   Endo/Heme/Allergies: Negative for polydipsia.   Psychiatric/Behavioral: Positive for depression. Negative for hallucinations. The patient is nervous/anxious. The patient does not have insomnia.      Physical Exam:     There were no vitals filed for this visit.    General Appearance: No distress, seated comfortably  Mood: Euthymic  HE ENT: Non constricted pupils  Respiratory: Non labored breathing  CVS: Regular rate and rhythm  GI: Soft, non distended, no TTP  Skin: No rashes over exposed skin. Well healed incision along thoracolumbar junction from recent laminectomy  MS: Tenderness to palpation over right > left L5-S1 paraspinous region as well as right SI joint  Straight leg raise is negative bilaterally  Femoral nerve stretch test produced right anterior leg pain  WAYNE produced right low back pain  Sacral compression, SIJ compression, SIJ distraction all produced right low back pain  Neuro: Strength is 5/5 in bilateral L2-S1 myotomes  Sensation is equal and intact to light touch bilaterally throughout lower extremities except anterolateral thigh where she had pain with light touch  Gait: Non-antalgic, ambulates without assistance      Laboratory results:  Recent Labs   Lab Test 09/23/19 0918 09/16/19 0914    139   POTASSIUM 3.5 3.3*   CHLORIDE 106 104   CO2 31 30   ANIONGAP 3 5   GLC 96 126*   BUN 9 11   CR 0.57 0.59   NATALIYA 8.8 8.8       CBC RESULTS:   Recent Labs   Lab Test 09/23/19 0918   WBC 6.4   RBC 4.04   HGB 12.7   HCT 38.9   MCV 96   MCH 31.4   MCHC 32.6   RDW 13.6            Imaging:       ASSESSMENT AND PLAN:     Encounter Diagnosis:    1.  Lumbar spondylosis  2. Sacroiliac dysfunction, right  3. S/P  epidural abscess with laminectomy and drainage  4. Lateral femoral cutaneous nerve entrapment right    Zayra Ramirez is a 49 year old y.o. old female who presents to the pain clinic with right > left low back pain with radiation to the right anterolateral leg which has been present for many years now.  She has had many treatments in the past including epidurals, radiofrequency ablations, spinal cord stimulator trial 3 months ago which was complicated by epidural abscess requiring laminectomy and drainage.  Her pain is primarily due to facet mediated pain with a component of sacroiliac joint dysfunction and radicular pain.     I have summarized the patient history, discussed their clinical findings and differential diagnosis with the patient. I have discussed anatomy and possible sources of the pain using models and/or pictures(diagrams). I have discussed multi- disciplinary pain management options with the patient as pertaining to their case as detailed above. All questions and concerns were answered to the best of my ability.     RECOMMENDATIONS:     1. Medications: Continue with cymbalta 120mg as prescribed.  Continue with naproxen as needed.      2. Procedure: We are scheduling the patient for bilateral lumbar medial branch radiofrequency ablation in the procedure suite. Risks/benefits/alternatives were discussed.  Patient has had this procedure multiple times in the past with significant relief at [facility] and [facility].  Last RFA was done early 2019 with 75% relief of back pain for 6 months. Will obtain records from [facility] and proceed with radiofrequency ablation.     Given recent epidural abscess 8/2019, would avoid steroid use in the near future and will avoid epidural procedures until 8/2020. At that time, would need a repeat MRI with contrast prior to any intervention.     The possible risks involved with interventional treatment are as following but not limited to- no pain control, worsened pain,  stroke, seizure, spinal headache, allergic reactions, introduction of infection or bleeding which may lead to emergent spine surgery, nerve damage, paralysis or even death.    3. Physical therapy: Patient has completed multiple rounds of physical therapy in the past. She is compliant with a home stretching routine and encouraged to continue with core strengthening and lumbar paraspinal stretching.     Patient was seen, examined and management discussed with attending physician Dr. Florian    Follow up: 4 weeks after RFA    Corby Black DO  Pain Medicine Fellow    ATTENDING ATTESTATION  Ms Ramirez, 49-year-old female presented to the pain clinic along with her father for low back pain.  I saw the patient along with Dr. Corby Black  And please refer to his note above for detailed history physical examination and plan.  I was involved actively and completing history taking physical examination and formulating plan of care.  Briefly    Ms. Ramirez,  has a very long history of low back pain for many years which was treated with  lumbar epidural steroid injections and radiofrequency ablation.  Earlier this year she underwent a spinal cord stimulator trial which was complicated by epidural abscess followed by thoracic laminectomy and prolonged antibiotics.  She presents to the clinic with pain consistent in her lower back area that is chronic and in the past treated with radiofrequency ablation.  We received the past procedure reports from [facility] and have scanned into the chart.  She has had resolution of her low back pain with radiofrequency ablation of bilateral L2-3-4 and 5 medial branches bilaterally and improvement in function.  It is reasonable to repeat the same procedure as her reappearance of symptoms is according to what is expected after a radiofrequency ablation.  She should not need repeated medial branch blocks for this ablation.  I have ordered bilateral L2, 3, 4, 5 medial branch radiofrequency ablation with fluoroscopy  and intravenous conscious sedation.    Again, thank you for allowing me to participate in the care of your patient.      Sincerely,    Tram Florian MD

## 2019-11-04 NOTE — NURSING NOTE
LPN reviewed AVS with Pt.  Pt verbalized an understanding of information, and was asked to contact clinic with questions.    LPN read through the instructions with pt for the recommended procedure: Bilateral Lumbar Radiofrequency Ablation.   Pt verbalized understanding to holding appropriate medication per protocol, and was agreeable to NPO policy and needing a .      Doreen Colbert LPN

## 2019-11-04 NOTE — PROGRESS NOTES
"  Pain Clinic New Patient Consult Note:    Referring Provider: Alverto   Primary care provider: Adam Del Toro.    Zayra Ramirez is a 49 year old y.o. old female who presents to the pain clinic with low back pain.     HPI:  Patient is a 49 year old female with PMH of depression,  RA on methotrexate, ILD on chronic oxygen, ROBERT who presents for evaluation of low back pain. Patient has had chronic low back pain for many years now.  She is s/p spinal cord stimulator trial (b/l entrance at L1-2 interlaminar space with placement of leads above superior endplate T7 bilaterally) on 8/8/19 with removal on 8/13/19 due to increased back pain.  At the time, she also had episode of incontinence. She was found with a T11-L1 epidural abscess.  She then had stimulator removed on 8/19/19 with T12-L2 laminectomy and abscess evacuation.  She was treated with IV antibiotics (cefazolin) for total of 6 weeks for this infection.     Her pain is primarily right low back. She has been dealing with back pain for 30 years.  No particular inciting event.  It radiates to the right anterolateral thigh. This pain is constant. The pain is worsened with sitting too long, walking too long.  No particular relieving positions.  For the pain, she takes naproxen 400 BID with uncertain relief. She was previously on gabapentin for mental health and stopped for \"mental reasons.\" She also takes cymbalta 120mg which she has been on for a few years. She states that she noticed a benefit with this at the beginning.  She has had b/l RFA greater than 5 times with significant relief of the pain in the past.  Last time was earlier this year.  She has also had epidural steroid injections many times (>10) with most recent being earlier this year. She has had this done at [facility] and at [facility].  She previously did PT, has done this on and off for 30 years.  She does home exercises which are mostly stretching exercises.  She has a traction machine at home. " She saw a chiropractor many years ago until they decided that they werent able to help her anymore.  She has never had surgery of her low back.  She has never had acupuncture for her back.      Pain treatments:    Physical therapy: Patient is compliant with home stretching routine. She had done PT on and off for the past 20 years  Chiropractor: Chiropractic treatment 20 years ago  Pain physician: Previously seeing [facility]  Surgery: Recent T11-L1 laminectomy for abscess drainage, decompression  Acupuncture: N/A    Tests/Imaging reviewed with the patient:    MRI lumbar spine 9/24/19:  Multilevel degenerative disc disease, degenerative facet arthropathy as well as congential fusion at T12-L1.  Spinal stenosis at L2-3 and L3-4  No evidence of residual epidural abscess s/p T12-L2 laminectomy    Significant Medical History:   Past Medical History:   Diagnosis Date     Allergic rhinitis      Anemia      Arthritis      Asthma     copd     Dental abscess 8-2015     Depressive disorder      Gastroesophageal reflux disease      History of emphysema      Hoarseness      Hypertension      Morbid obesity with BMI of 40.0-44.9, adult (H)      Obstructive sleep apnea      Respiratory bronchiolitis interstitial lung disease (H)      Sleep apnea           Past Surgical History:  Past Surgical History:   Procedure Laterality Date     COLONOSCOPY       ENT SURGERY       EXCISE LESION INTRAORAL Bilateral 10/3/2018    Procedure: EXCISE LESION INTRAORAL;  Wide Local Excision Of of Left Oral Cavity Ulcer;  Surgeon: Morro Mijares MD;  Location: [facility] OR     HC DRAIN SKIN ABSCESS SIMPLE/SINGLE  3/16/2012    Procedure:INCISION AND DRAINAGE, ABSCESS, SIMPLE; Surgeon:CHRISTIANO HANCOCK; Location: GI     HEAD & NECK SURGERY       HYSTERECTOMY       INCISION AND DRAINAGE ABDOMEN WASHOUT, COMBINED       LAMINECTOMY THORACIC ONE LEVEL N/A 8/19/2019    Procedure: LAMINECTOMY, SPINE, THORACIC, 11-12 and Part of  Lumbar 1, DRAINAGE OF EPIDURAL ABCESS, Epidural Drain Placement X 2;  Surgeon: Yadiel Beal MD;  Location: [facility] OR          Family History:  Family History   Problem Relation Age of Onset     Other Cancer Father         base of tongue cancer at age ~65     Hypertension Father      Back Pain Father      Asthma Mother           Social History:  Social History     Socioeconomic History     Marital status: Single     Spouse name: Not on file     Number of children: Not on file     Years of education: Not on file     Highest education level: Not on file   Occupational History     Not on file   Social Needs     Financial resource strain: Not on file     Food insecurity:     Worry: Not on file     Inability: Not on file     Transportation needs:     Medical: Not on file     Non-medical: Not on file   Tobacco Use     Smoking status: Current Every Day Smoker     Packs/day: 1.00     Years: 30.00     Pack years: 30.00     Types: Cigarettes     Start date: 1/1/1996     Smokeless tobacco: Never Used   Substance and Sexual Activity     Alcohol use: No     Binge frequency: Monthly     Drug use: No     Sexual activity: Never   Lifestyle     Physical activity:     Days per week: Not on file     Minutes per session: Not on file     Stress: Not on file   Relationships     Social connections:     Talks on phone: Not on file     Gets together: Not on file     Attends Caodaism service: Not on file     Active member of club or organization: Not on file     Attends meetings of clubs or organizations: Not on file     Relationship status: Not on file     Intimate partner violence:     Fear of current or ex partner: Not on file     Emotionally abused: Not on file     Physically abused: Not on file     Forced sexual activity: Not on file   Other Topics Concern     Parent/sibling w/ CABG, MI or angioplasty before 65F 55M? Not Asked   Social History Narrative     Not on file     Social History     Patient does not qualify to have  social determinant information on file (likely too young).   Social History Narrative     Not on file          Allergies:  Allergies   Allergen Reactions     Bee Venom Anaphylaxis     Bees      Doxycycline Anaphylaxis     Patient thinks it may have been just nausea and vomiting, however unable to confirm     Erythromycin Anaphylaxis and Shortness Of Breath     Other reaction(s): Vomiting     Hydrocodone-Acetaminophen Itching       Current Medications:   Current Outpatient Medications   Medication Sig Dispense Refill     acetaminophen (TYLENOL) 325 MG tablet Take 2 tablets (650 mg) by mouth every 4 hours as needed for other (multimodal surgical pain management along with NSAIDS and opioid medication as indicated based on pain control and physical function.)       albuterol (PROAIR HFA, PROVENTIL HFA, VENTOLIN HFA) 108 (90 BASE) MCG/ACT inhaler Inhale 2 puffs into the lungs every 6 hours as needed for shortness of breath / dyspnea or wheezing       ARIPiprazole (ABILIFY) 15 MG tablet Take 15 mg by mouth daily        ceFAZolin (ANCEF) intermittent infusion 2 g in 100mL NS (pre-mix) Inject 100 mLs (2 g) into the vein every 8 hours 30 each 11     cholecalciferol ( ULTRA STRENGTH) 2000 units CAPS TAKE ONE CAPSULE BY MOUTH DAILY IN AM       cyclobenzaprine (FLEXERIL) 10 MG tablet Take 1 tablet (10 mg) by mouth 3 times daily 30 tablet 0     DULoxetine (CYMBALTA) 60 MG capsule Take 120 mg by mouth daily        ferrous sulfate (FEROSUL) 325 (65 Fe) MG tablet Take 325 mg by mouth daily  0     fluticasone-salmeterol (ADVAIR) 500-50 MCG/DOSE diskus inhaler Inhale 1 puff into the lungs every 12 hours       folic acid (FOLVITE) 1 MG tablet Take 1 tablet (1 mg) by mouth daily 90 tablet 3     hydrOXYzine (ATARAX) 50 MG tablet Take 1 tab 30 minutes prior to MRI, take additional tab at time of MRI if still anxious (Patient not taking: Reported on 9/25/2019) 2 tablet 0     lisinopril-hydrochlorothiazide (PRINZIDE/ZESTORETIC) 20-25  MG per tablet Take 1 tablet by mouth daily 30 tablet 0     methotrexate 2.5 MG tablet Take 9 tabs once weekly 96 tablet 2     montelukast (SINGULAIR) 10 MG tablet Take 10 mg by mouth At Bedtime       oxyCODONE (ROXICODONE) 5 MG tablet Take 1-2 tablets (5-10 mg) by mouth every 3 hours as needed (Patient not taking: Reported on 9/13/2019) 45 tablet 0     OXYGEN-HELIUM IN 2-3L PRN during the day, 3L @ night       predniSONE (DELTASONE) 5 MG tablet TAKE 3 TABLETS BY MOUTH DAILY FOR 10 DAYS, THEN 2 TABS DAILY FOR 10 DAYS, THEN OFF. 50 tablet 1     rOPINIRole (REQUIP) 2 MG tablet Take 2 mg by mouth nightly as needed        senna-docusate (SENOKOT-S/PERICOLACE) 8.6-50 MG tablet Take 1 tablet by mouth 2 times daily (Patient not taking: Reported on 9/13/2019) 45 tablet 0     vitamin C 500 MG TABS Take 1 tablet (500 mg) by mouth 2 times daily       zinc sulfate (ZINCATE) 220 (50 Zn) MG capsule Take 1 capsule (220 mg) by mouth daily            Current Pain Medications:  Medications related to Pain Management (From now, onward)    None           Past Pain Medications:  Trialed gabapentin in the past for mood disorder.  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   Respiratory: Positive for sputum production, shortness of breath and wheezing. Negative for cough and hemoptysis.    Gastrointestinal: Positive for heartburn. Negative for abdominal pain, blood in stool, constipation, diarrhea, melena, nausea and vomiting.   Musculoskeletal: Positive for back pain, myalgias and neck pain.    Endo/Heme/Allergies: Negative for polydipsia.   Psychiatric/Behavioral: Positive for depression. Negative for hallucinations. The patient is nervous/anxious. The patient does not have insomnia.      .  Answers for HPI/ROS submitted by the patient on 11/4/2019   LISA 7 TOTAL SCORE: 6  General Symptoms: Yes  Skin Symptoms: No  HENT Symptoms: No  EYE SYMPTOMS: No  HEART SYMPTOMS: No  LUNG SYMPTOMS: Yes  INTESTINAL SYMPTOMS: Yes  URINARY SYMPTOMS: No  GYNECOLOGIC SYMPTOMS: No  BREAST SYMPTOMS: No  SKELETAL SYMPTOMS: Yes  BLOOD SYMPTOMS: No  NERVOUS SYSTEM SYMPTOMS: No  MENTAL HEALTH SYMPTOMS: Yes  Loss of appetite: No  Weight gain: No  Fatigue: Yes  Night sweats: No  Increased stress: Yes  Excessive hunger: No  Feeling hot or cold when others believe the temperature is normal: No  Loss of height: No  Post-operative complications: No  Surgical site pain: No  Change in or Loss of Energy: No  Hyperactivity: No  Confusion: No  Snoring: Yes  Difficulty breathing on exertion: Yes  Nighttime Cough: No  Difficulty breathing when lying flat: No  Bloating: No  Rectal or Anal pain: No  Fecal incontinence: No  Yellowing of skin or eyes: No  Vomit with blood: No  Change in stools: No  Swollen joints: Yes  Joint pain: Yes  Bone pain: No  Muscle cramps: No  Muscle weakness: No  Joint stiffness: Yes  Bone fracture: No  Trouble thinking or concentrating: No  Mood changes: Yes  Panic attacks: No      Physical Exam:     There were no vitals filed for this visit.    General Appearance: No distress, seated comfortably  Mood: Euthymic  HE ENT: Non constricted pupils  Respiratory: Non labored breathing  CVS: Regular rate and rhythm  GI: Soft, non distended, no TTP  Skin: No rashes over exposed skin. Well healed incision along thoracolumbar junction from recent laminectomy  MS: Tenderness to palpation over right > left L5-S1 paraspinous region as well as right SI joint  Straight leg raise is negative bilaterally  Femoral nerve stretch test  produced right anterior leg pain  WAYNE produced right low back pain  Sacral compression, SIJ compression, SIJ distraction all produced right low back pain  Neuro: Strength is 5/5 in bilateral L2-S1 myotomes  Sensation is equal and intact to light touch bilaterally throughout lower extremities except anterolateral thigh where she had pain with light touch  Gait: Non-antalgic, ambulates without assistance      Laboratory results:  Recent Labs   Lab Test 09/23/19  0918 09/16/19  0914    139   POTASSIUM 3.5 3.3*   CHLORIDE 106 104   CO2 31 30   ANIONGAP 3 5   GLC 96 126*   BUN 9 11   CR 0.57 0.59   NATALIYA 8.8 8.8       CBC RESULTS:   Recent Labs   Lab Test 09/23/19 0918   WBC 6.4   RBC 4.04   HGB 12.7   HCT 38.9   MCV 96   MCH 31.4   MCHC 32.6   RDW 13.6            Imaging:       ASSESSMENT AND PLAN:     Encounter Diagnosis:    1.  Lumbar spondylosis  2. Sacroiliac dysfunction, right  3. S/P epidural abscess with laminectomy and drainage  4. Lateral femoral cutaneous nerve entrapment right    Zayra Ramirez is a 49 year old y.o. old female who presents to the pain clinic with right > left low back pain with radiation to the right anterolateral leg which has been present for many years now.  She has had many treatments in the past including epidurals, radiofrequency ablations, spinal cord stimulator trial 3 months ago which was complicated by epidural abscess requiring laminectomy and drainage.  Her pain is primarily due to facet mediated pain with a component of sacroiliac joint dysfunction and radicular pain.     I have summarized the patient history, discussed their clinical findings and differential diagnosis with the patient. I have discussed anatomy and possible sources of the pain using models and/or pictures(diagrams). I have discussed multi- disciplinary pain management options with the patient as pertaining to their case as detailed above. All questions and concerns were answered to the best of my  ability.     RECOMMENDATIONS:     1. Medications: Continue with cymbalta 120mg as prescribed.  Continue with naproxen as needed.      2. Procedure: We are scheduling the patient for bilateral lumbar medial branch radiofrequency ablation in the procedure suite. Risks/benefits/alternatives were discussed.  Patient has had this procedure multiple times in the past with significant relief at [facility] and [facility].  Last RFA was done early 2019 with 75% relief of back pain for 6 months. Will obtain records from [facility] and proceed with radiofrequency ablation.     Given recent epidural abscess 8/2019, would avoid steroid use in the near future and will avoid epidural procedures until 8/2020. At that time, would need a repeat MRI with contrast prior to any intervention.     The possible risks involved with interventional treatment are as following but not limited to- no pain control, worsened pain, stroke, seizure, spinal headache, allergic reactions, introduction of infection or bleeding which may lead to emergent spine surgery, nerve damage, paralysis or even death.    3. Physical therapy: Patient has completed multiple rounds of physical therapy in the past. She is compliant with a home stretching routine and encouraged to continue with core strengthening and lumbar paraspinal stretching.     Patient was seen, examined and management discussed with attending physician Dr. Florian    Follow up: 4 weeks after RFA    Corby Black DO  Pain Medicine Fellow    ATTENDING ATTESTATION  Ms Ramirez, 49-year-old female presented to the pain clinic along with her father for low back pain.  I saw the patient along with Dr. Corby Black  And please refer to his note above for detailed history physical examination and plan.  I was involved actively and completing history taking physical examination and formulating plan of care.  Briefly    Ms. Ramirez,  has a very long history of low back pain for many years which was treated with  lumbar  epidural steroid injections and radiofrequency ablation.  Earlier this year she underwent a spinal cord stimulator trial which was complicated by epidural abscess followed by thoracic laminectomy and prolonged antibiotics.  She presents to the clinic with pain consistent in her lower back area that is chronic and in the past treated with radiofrequency ablation.  We received the past procedure reports from [facility] and have scanned into the chart.  She has had resolution of her low back pain with radiofrequency ablation of bilateral L2-3-4 and 5 medial branches bilaterally and improvement in function.  It is reasonable to repeat the same procedure as her reappearance of symptoms is according to what is expected after a radiofrequency ablation.  She should not need repeated medial branch blocks for this ablation.  I have ordered bilateral L2, 3, 4, 5 medial branch radiofrequency ablation with fluoroscopy and intravenous conscious sedation.

## 2019-11-04 NOTE — TELEPHONE ENCOUNTER
Spoke with: Zayra     Date Scheduled: 12/17/19    Procedure Time:1245 om     Arrival Time: 1145 am     Provider: Chiqui     : yes     F/U Appointment: Pt told they will need to call to set up a 4 week follow up.      LPN assisted pt to schedule procedure in clinic.

## 2019-11-04 NOTE — PATIENT INSTRUCTIONS
1. You are scheduled for your procedure on 12/17/19 at 1245 pm. Please arrive by 11:45 am.     Call [phone] to cancel/reschedule procedure. Please leave voicemail if needed.     Call [phone] to schedule a follow up clinic appointment.     Follow up: 4 weeks after procedure. Please call to schedule.       Your procedure: Bilateral Lumbar Radiofrequency Ablation.       On the day of the procedure  1. Arrive 1 hour earlier than your scheduled time, to the [facility]  Address: [address] [place] [id number]  2. Check in on the 5th floor for your procedure      If you must reschedule your procedure more than two times, you must follow up in clinic before rescheduling again.    Preparing for your procedure    CAUTION - FAILURE TO FOLLOW THESE PRE-PROCEDURE INSTRUCTIONS WILL RESULT IN YOUR PROCEDURE BEING RESCHEDULED.      Pregnancy  If you are pregnant, or think you may be pregnant, please notify our staff. This may or may not affect the ability to perform the procedure.       You must have a  take you home after your procedure. Transportation by taxi or para-transit is okay as long as you have a responsible adult accompany you. You must provide your 's full name and contact number at time of check in.     Fasting Protocol You may have NOTHING SOLID TO EAT 8 HOURS prior to arrival at the procedure area.     You may have CLEAR LIQUIDS UP TO 2 HOURS prior to arrival.    Broth and candy are considered solid food and require an eight hour fast.     Clear liquids include water, clear fruit juice (no pulp), carbonated beverages, ice, black coffee, black tea, clear jello. No alcohol containing beverages.   Medications If you take any medications, DO NOT STOP. Take your medications as usual the day of your procedure with a sip of water AT LEAST 2 HOURS PRIOR TO ARRIVAL.    Antibiotics If you are currently taking antibiotics, you must complete the entire dose 7 days prior to  your scheduled procedure. You must be clear of any signs or symptoms of infection. If you begin antibiotics, please contact our clinic for instructions.     Fever, Chills, or Rash If you experience a fever of higher than 100 degrees, chills, rash, or open wounds during the one week before your procedure, please call the clinic to see if you may proceed with your procedure.      Medication Hold List  **Patients under Cardiology/Neurology care should consult their provider prior to the pain procedure to verify pre-procedure medication instructions. The information below contains general guidelines.**    Blood Thinners If you are taking daily ASPIRIN, PLAVIX, OR OTHER BLOOD THINNERS SUCH AS COUMADIN/WARFARIN, we will need your prescribing doctor to sign a release permitting you to stop these medications. Once approved by your prescribing doctor - STOP ALL BLOOD THINNERS BASED ON THE TIME TABLE BELOW PRIOR TO YOUR PROCEDURE. If you have been instructed to stop WARFARIN(COUMADIN), you must have an INR lab drawn the day before your procedure. . Your INR must be within normal limits before we can perform your injection. MEDICATIONS CAN BE RESTARTED AFTER YOUR PROCEDURE.    14 DAY HOLD  Ticlid (ticlopidine)    10 DAY HOLD  Effient (Prasugel)    3 DAY HOLD  Xarelto (rivaroxaban) 7 DAY HOLD  Anacin, Bufferin, Ecotrin, Excedrin, Aggrenox (Aspirin)  Brilinta (ticagrelor)  Coumadin (Warfarin)  Pradexa (Dabigatran)  Elmiron (Pentosan)  Plavix (Clopidogrel Bisulfate)  Pletal (Cilostazol)    24 HOUR HOLD  Lovenox (enoxaparin)  Agrylin (Anagrelide)        Non-steroidal Anti-inflammatories (NSAIDs) DO NOT TAKE any non-steroidal anti-inflammatory medications (NSAIDs) listed on the table below. MEDICATIONS CAN BE RESTARTED AFTER YOUR PROCEDURE. Celebrex is OK to take and does not need to be discontinued.     Medications to stop:  3 DAY HOLD  Advil, Motrin (Ibuprofen)  Arthrotec (diciofenac sodium/misoprostol)  Clinoril  (Sulindac)  Indocin (Indomethacin)  Lodine (Etodolac)  Toradol (Ketorolac)  Vicoprofen (Hydrocodone and Ibuprofen)  Voltaren (Diclotenac)    14 DAY HOLD  Daypro (Oxaprozin)  Feldene (Piroxicam)   7 DAY HOLD  Aleve (Naproxen sodium)  Darvon compound (contains aspirin)  Naprosyn (Naproxen)  Norgesic Forte (contains aspirin)  Mobic (Meloxicam)  Oruvall (Ketoprofen)  Percodan (contains aspirin)  Relafen (Nabumetone)  Salsalate  Trilisate  Vitamin E (more than 400 mg per day)  Any medication containing aspirin                To speak with a nurse, schedule/reschedule/cancel a clinic appointment, or request a medication refill call: [phone]     You can also reach us by 123people: [email or web address]

## 2019-11-05 ASSESSMENT — ANXIETY QUESTIONNAIRES: GAD7 TOTAL SCORE: 6

## 2019-11-18 ENCOUNTER — NURSE TRIAGE (OUTPATIENT)
Dept: NURSING | Facility: CLINIC | Age: 49
End: 2019-11-18

## 2019-11-18 NOTE — TELEPHONE ENCOUNTER
Pt reporting, she had gallbladder surgery early in the spring of this year.    Every thing was going well until about 4 weeks ago when the Pt noticed some upper right abdominal pain right underneath the breast area where she had gallbladder surgery.       The pain comes and goes.     Pt mentioned there is a red area underneath the right breast area that is red, and warm to the touch.   It is about 4 inches long and 1 inch wide.   Some chills noted.   Has not taking her temperature.    No shortness of breath or difficulty breathing noted.   No nausea or vomiting noted.    Loose stools the last 4 days.  No nausea or vomiting noted.  No urination issues at this time.      Pt wanting to be seen in clinic.         Plan of Care  Office Visit 11/19/2019 @ 8:30 AM  Call back if new or worsen symptoms arise    Lucie Ambriz RN  Central Triage Red Flags/Med Refills      Additional Information    Negative: Passed out (i.e., fainted, collapsed and was not responding)    Negative: Shock suspected (e.g., cold/pale/clammy skin, too weak to stand, low BP, rapid pulse)    Negative: Visible sweat on face or sweat is dripping down    Negative: Chest pain    Negative: SEVERE abdominal pain (e.g., excruciating)    Negative: Pain lasting > 10 minutes and over 50 years old    Negative: Pain lasting > 10 minutes and over 40 years old and associated chest, arm, neck, upper back, or jaw pain    Negative: Pain lasting > 10 minutes and over 35 years old and at least one cardiac risk factor    Negative: Pain lasting > 10 minutes and history of heart disease (i.e., heart attack, bypass surgery, angina, angioplasty, CHF)    Negative: Recent injury to the abdomen    Negative: Vomiting red blood or black (coffee ground) material    Negative: Bloody, black, or tarry bowel movements    Negative: Pregnant > 24 weeks and hand or face swelling    Negative: Constant abdominal pain lasting > 2 hours    Negative: Vomiting bile (green color)    Negative:  Patient sounds very sick or weak to the triager    Negative: Vomiting and abdomen looks much more swollen than usual    Negative: White of the eyes have turned yellow (i.e.,  jaundice)    Negative: Fever > 103 F (39.4 C)    Negative: Fever > 101 F (38.3 C) and over 60 years of age    Negative: Fever > 100.0 F (37.8 C) and has diabetes mellitus or a weak immune system (e.g., HIV positive, cancer chemotherapy, organ transplant, splenectomy, chronic steroids)    Negative: Fever > 100.0 F (37.8 C) and bedridden (e.g., nursing home patient, stroke, chronic illness, recovering from surgery)    Negative: Age > 60 years    Negative: Patient wants to be seen    Negative: MILD pain that comes and goes (cramps) lasts > 24 hours    Negative: Alcohol abuse known or suspected    Abdominal pain is a chronic symptom (recurrent or ongoing AND lasting > 4 weeks)    Protocols used: ABDOMINAL PAIN - UPPER-A-OH

## 2019-11-19 ENCOUNTER — OFFICE VISIT (OUTPATIENT)
Dept: INTERNAL MEDICINE | Facility: CLINIC | Age: 49
End: 2019-11-19
Payer: COMMERCIAL

## 2019-11-19 VITALS
WEIGHT: 253 LBS | DIASTOLIC BLOOD PRESSURE: 85 MMHG | SYSTOLIC BLOOD PRESSURE: 128 MMHG | HEART RATE: 83 BPM | HEIGHT: 64 IN | OXYGEN SATURATION: 95 % | BODY MASS INDEX: 43.19 KG/M2

## 2019-11-19 DIAGNOSIS — R10.13 ABDOMINAL PAIN, EPIGASTRIC: Primary | ICD-10-CM

## 2019-11-19 ASSESSMENT — PAIN SCALES - GENERAL: PAINLEVEL: NO PAIN (0)

## 2019-11-19 ASSESSMENT — MIFFLIN-ST. JEOR: SCORE: 1762.85

## 2019-11-19 NOTE — PROGRESS NOTES
"[facility]   Neena Tam, NICKO CNP  11/19/2019      Chief Complaint:   Abdominal Pain     History of Present Illness:   Zayra Ramirez is a 49 year old female with a history of depression, gastroesophageal reflux disease (GERD), hypertension, respiratory bronchiolitis interstitial lung disease, and cervical spinal stenosis who presents for evaluation of abdominal pain. For the last few weeks, when she wakes up, the middle of her abdomen (middle and right-sided) is sore/tender, and it worsens throughout the day and becomes painful very tender to palpation by nighttime. At night she sometimes has to hold her She reports if she coughs, it is very tender. Right now the pain is a 3/10 in severity and at night it is a 6/10. She notes she had a cholecystectomy last spring. She has been taking iron supplements since July or August and takes those at bedtime. She usually eats something light beforehand. Her bowel movements right now are loose, \"but steady.\" She states they have been loose for a few months now. She denies nausea or constipation. She denies excessive belching. She reports before this pain she had a couple weeks of severe heartburn, but she took omeprazole and it resolved a few weeks ago. This pain started shortly before the heartburn resolved. She is still taking omeprazole. She does not have a hiatal hernia as far as she knows. She states her upper abdomen sometimes feels swollen, but she would not say she feels bloated. She has had an EGD before, many years ago. She endorses drinking 4 caffeinated sodas per day and sometimes drinks coffee. She does not normally drink much orange juice. She does not drink alcohol. She is still smoking.    Other concerns discussed:  1. She already received her flu shot.     Review of Systems:   Pertinent items are noted in HPI or as in patient entered ROS below, remainder of complete ROS is negative.     Active Medications:       acetaminophen " (TYLENOL) 325 MG tablet, Take 2 tablets (650 mg) by mouth every 4 hours as needed for other (multimodal surgical pain management along with NSAIDS and opioid medication as indicated based on pain control and physical function.), Disp: , Rfl:      albuterol (PROAIR HFA, PROVENTIL HFA, VENTOLIN HFA) 108 (90 BASE) MCG/ACT inhaler, Inhale 2 puffs into the lungs every 6 hours as needed for shortness of breath / dyspnea or wheezing, Disp: , Rfl:      ARIPiprazole (ABILIFY) 15 MG tablet, Take 15 mg by mouth daily , Disp: , Rfl:      ceFAZolin (ANCEF) intermittent infusion 2 g in 100mL NS (pre-mix), Inject 100 mLs (2 g) into the vein every 8 hours, Disp: 30 each, Rfl: 11     cholecalciferol ( ULTRA STRENGTH) 2000 units CAPS, TAKE ONE CAPSULE BY MOUTH DAILY IN AM, Disp: , Rfl:      cyclobenzaprine (FLEXERIL) 10 MG tablet, Take 1 tablet (10 mg) by mouth 3 times daily, Disp: 30 tablet, Rfl: 0     DULoxetine (CYMBALTA) 60 MG capsule, Take 120 mg by mouth daily , Disp: , Rfl:      ferrous sulfate (FEROSUL) 325 (65 Fe) MG tablet, Take 325 mg by mouth daily, Disp: , Rfl: 0     fluticasone-salmeterol (ADVAIR) 500-50 MCG/DOSE diskus inhaler, Inhale 1 puff into the lungs every 12 hours, Disp: , Rfl:      folic acid (FOLVITE) 1 MG tablet, Take 1 tablet (1 mg) by mouth daily, Disp: 90 tablet, Rfl: 3     lisinopril-hydrochlorothiazide (PRINZIDE/ZESTORETIC) 20-25 MG per tablet, Take 1 tablet by mouth daily, Disp: 30 tablet, Rfl: 0     methotrexate 2.5 MG tablet, Take 9 tabs once weekly, Disp: 96 tablet, Rfl: 2     montelukast (SINGULAIR) 10 MG tablet, Take 10 mg by mouth At Bedtime, Disp: , Rfl:      OXYGEN-HELIUM IN, 2-3L PRN during the day, 3L @ night, Disp: , Rfl:      predniSONE (DELTASONE) 5 MG tablet, TAKE 3 TABLETS BY MOUTH DAILY FOR 10 DAYS, THEN 2 TABS DAILY FOR 10 DAYS, THEN OFF., Disp: 50 tablet, Rfl: 1     rOPINIRole (REQUIP) 2 MG tablet, Take 2 mg by mouth nightly as needed , Disp: , Rfl:      vitamin C 500 MG TABS,  "Take 1 tablet (500 mg) by mouth 2 times daily, Disp: , Rfl:      zinc sulfate (ZINCATE) 220 (50 Zn) MG capsule, Take 1 capsule (220 mg) by mouth daily, Disp: , Rfl:      Allergies:   Bee venom; Bees; Doxycycline; Erythromycin; and Hydrocodone-acetaminophen      Past Medical History:  Allergic rhinitis  Anemia  Arthritis  Asthma  Dental abscess  Depressive disorder  Gastroesophageal reflux disease (GERD)  Hypertension  Morbid obesity  Obstructive sleep apnea  Respiratory bronchiolitis interstitial lung disease  Chronic midline low beverly pain  Facial cellulitis  Subcutaneous emphysema  Borderline personality disorder  Stenosis of cervical spine with myelopathy  Esophageal stricture  Onychomycosis  Restless leg syndrome  Spinal epidural abscess  Lumbar spondylosis     Past Surgical History:  Colonoscopy  ENT surgery  Excise lesion intraoral, bilateral - 10/03/2018  Drain skin abscess simple - 03/16/2012  Head & neck surgery  Hysterectomy  Incision and drainage abdomen washout, combined  Laminectomy thoracic one level with drainage of epidural abscess - 08/19/2019    Family History:   Cancer - father (base of tongue, age 65)  Hypertension - father  Back pain - father  Asthma - mother      Social History:   The patient is single, a current every day smoker I1 ppd for 30 years), and does not consume alcohol.      Physical Exam:   /85 (BP Location: Right arm, Patient Position: Sitting, Cuff Size: Adult Large)   Pulse 83   Ht 1.626 m (5' 4.02\")   Wt 114.8 kg (253 lb)   LMP  (LMP Unknown)   SpO2 95%   BMI 43.41 kg/m     Wt Readings from Last 1 Encounters:   11/19/19 114.8 kg (253 lb)     Constitutional: no distress, comfortable, pleasant   Eyes: anicteric, conjunctiva pink.  Ears, Nose and Throat: tympanic membranes clear, throat clear.   Cardiovascular: regular rate and rhythm, normal S1 and S2, no murmurs, rubs or gallops, peripheral pulses full and symmetric   Respiratory: clear to auscultation, no wheezes or " crackles, normal breath sounds   Gastrointestinal: quiet bowel sounds, nontender, no hepatosplenomegaly, no masses   Musculoskeletal: full range of motion, no edema   Skin: no concerning lesions, no jaundice, temp normal   Neurological: normal gait, normal speech, no tremor. A and O x 3, good historian.  Psychological: appropriate mood, good eye contact, normal affect      Assessment and Plan:  Abdominal pain, epigastric  Recommended EGD to check for esophageal erosion. Also recommended cutting back on carbonated and caffeinated drinks to reduce stomach irritants. Advised her to take her iron pills with her biggest meal of the day to avoid irritation of the stomach lining. She can continue taking omeprazole.  - GASTROENTEROLOGY ADULT REF CONSULT ONLY     Total time spent 25 minutes.  More than 50% of the time spent with Ms. Ramirez on counseling / coordinating her care.    Neena MAHARAJ CNP      Scribe Disclosure:  I, Joann Siddiqui, am serving as a scribe to document services personally performed by NICKO Lynn CNP at this visit, based upon the provider's statements to me. All documentation has been reviewed by the aforementioned provider prior to being entered into the official medical record.     Portions of this medical record were completed by a scribe. UPON MY REVIEW AND AUTHENTICATION BY ELECTRONIC SIGNATURE, this confirms (a) I performed the applicable clinical services, and (b) the record is accurate.

## 2019-11-19 NOTE — PATIENT INSTRUCTIONS
[facility] Medication Refill Request Information:  * Please contact your pharmacy regarding ANY request for medication refills.  ** [facility] Prescription Fax = [phone]  * Please allow 3 business days for routine medication refills.  * Please allow 5 business days for controlled substance medication refills.     [facility] Test Result notification information:  *You will be notified with in 7-10 days of your appointment day regarding the results of your test.  If you are on MyChart you will be notified as soon as the provider has reviewed the results and signed off on them.    [facility]: [phone]       Continue omeprazole.    Take iron supplement with a  Meal.    Cut back on carbonated, caffeinated drinks.

## 2019-11-19 NOTE — TELEPHONE ENCOUNTER
RECORDS RECEIVED FROM: [facility]- NICKO Guevara CNP   DATE RECEIVED: 11/20/2019   NOTES STATUS DETAILS   OFFICE NOTE from referring provider Internal 11/19/19 Office visit with NICKO Guevara CNP    OFFICE NOTE from other specialist Care Everywhere 5/9/19 Office visit with Dr. Aime Mason ([facility])   DISCHARGE SUMMARY from hospital N/A    OPERATIVE REPORT N/A    MEDICATION LIST Internal         ENDOSCOPY  N/A    COLONOSCOPY Received 4/17/15 ([facility])   ERCP N/A    EUS N/A    STOOL TESTING Care Everywhere 2/7/19   PERTINENT LABS Internal/ Care Everywhere    PATHOLOGY REPORTS (RELATED) N/A    IMAGING (CT, MRI, EGD) Care Everywhere CT Abdomen Pelvis: 5/10/19 ([facility])  US Abdomen: 4/25/19 ([facility])    *request sent to [facility] ([phone]) for images noted above 11/19 -ao     *images were archived into PACS on 11/19     REFERRAL INFORMATION    Date referral was placed: 11/20/2019   Date all records received: N/A   Date records were scanned into Epic: N/A   Date records were sent to Provider to review: N/A   Date and recommendation received from provider:  LETTER SENT  SCHEDULE APPOINTMENT   Date patient was contacted to schedule: 11/19/2019 11/19/19 [facility] CT Abdomen Pelvis 5/10/19 and US Abdomen 4/25/19 (order was made to them in [place]); they do have the images and will push image into [facility] PACS.- Bhao

## 2019-11-19 NOTE — NURSING NOTE
Chief Complaint   Patient presents with     Abdominal Pain     patient is here for evaluation of epigastric pain x2 weeks        Emely Santos LPN, EMT at 8:24 AM on 11/19/2019.

## 2019-11-20 ENCOUNTER — OFFICE VISIT (OUTPATIENT)
Dept: GASTROENTEROLOGY | Facility: CLINIC | Age: 49
End: 2019-11-20
Attending: NURSE PRACTITIONER
Payer: COMMERCIAL

## 2019-11-20 ENCOUNTER — PRE VISIT (OUTPATIENT)
Dept: GASTROENTEROLOGY | Facility: CLINIC | Age: 49
End: 2019-11-20

## 2019-11-20 VITALS
DIASTOLIC BLOOD PRESSURE: 75 MMHG | BODY MASS INDEX: 42.92 KG/M2 | OXYGEN SATURATION: 95 % | HEIGHT: 64 IN | HEART RATE: 81 BPM | TEMPERATURE: 99 F | WEIGHT: 251.4 LBS | SYSTOLIC BLOOD PRESSURE: 133 MMHG

## 2019-11-20 DIAGNOSIS — R10.13 EPIGASTRIC PAIN: Primary | ICD-10-CM

## 2019-11-20 ASSESSMENT — MIFFLIN-ST. JEOR: SCORE: 1755.66

## 2019-11-20 ASSESSMENT — PAIN SCALES - GENERAL: PAINLEVEL: NO PAIN (0)

## 2019-11-20 NOTE — LETTER
11/20/2019       RE: Zayra Ramirez  [address] [place] [address]     Dear Colleague,    Thank you for referring your patient, Zayra Ramirez, to the [facility] at [facility]. Please see a copy of my visit note below.    GI CLINIC VISIT    CC/REFERRING MD:  Neena Tam  REASON FOR CONSULTATION: abdominal pain    ASSESSMENT/PLAN:  49-year-old female with past medical history to include depression, GERD (on omeprazole, unsure dose), rheumatoid arthritis on methotrexate weekly and folic acid daily not currently on prednisone, hypertension on lisinopril, interstitial lung disease, cervical spinal stenosis presents to the GI clinic for consultation regarding epigastric abdominal pain.    1. Abdominal pain: Patient's description of abdominal pain highly suggest musculoskeletal etiology with ability to reproduce the pain upon palpation and coughing.  There is no association with eating or bowel movements.  Pain has been present for the last 2 weeks.  There are several lifestyle factors that could also be aggravating the abdominal pain such as 6 cans of Mountain Dew's a day and smoking 1 pack a day.  She is not experiencing any associated symptoms such as dysphasia or classic reflux although she may have some component of silent reflux.  At this time there is not a strong indication to move directly to an endoscopic assessment.  We will plan to monitor her symptoms for the next several weeks.  If pain continues to persist then we could discuss possibility of imaging versus endoscopic assessment.  For management of the pain she describes, a trial of heat and/or ice packs as well as avoiding irritants such as caffeine and smoking may help as well.  --Close monitoring for the next several weeks with eliminating irritants  --Reevaluate at follow-up, if persistent then determine if imaging and/or endoscopic assessment would be needed at  that time.    2. Colorectal cancer screening: History of adenomatous polyp in the past.  Normal colonoscopy in 2015 with recommendation to repeat in 5 years, due in 2020.    RTC 6-8 weeks    Thank you for this consultation.  It was a pleasure to participate in the care of this patient; please contact us with any further questions.     Abhi Villafuerte PA-C  Division of Gastroenterology, Hepatology and Nutrition  [facility]      HPI  49-year-old female with past medical history to include depression, GERD (on omeprazole, unsure dose), rheumatoid arthritis on methotrexate weekly and folic acid daily not currently on prednisone, hypertension on lisinopril, interstitial lung disease, cervical spinal stenosis presents to the GI clinic for consultation regarding epigastric abdominal pain.  Patient describes his pain has been going on for the last 2 weeks and wakes her up at night.  She feels that the pain gets worse throughout the day.  She describes it as a numb feeling inside her abdomen.  She can reproduce the pain with palpation.  Pain is also worsened by coughing or moving.  Rest sometimes will improve the pain.  She denies any nausea, vomiting or dysphasia accompanied by this pain.  Bowel movements are average 3 formed to soft stools per day, [place] 4-5.  She denies any blood in the stool.  She had a normal colonoscopy in 2015 and recommended to repeat in 5 years.  I do note that she admits to high caffeine intake with 6 Mountain Dew drinks per day.  She smokes 1 pack/day.    ROS:    No fevers or chills  No weight loss  No blurry vision, double vision or change in vision  No sore throat  No lymphadenopathy  No headache, paraesthesias, or weakness in a limb  No shortness of breath or wheezing  No chest pain or pressure  No arthralgias or myalgias  No rashes or skin changes  No odynophagia or dysphagia  No BRBPR, hematochezia, melena  No dysuria, frequency or urgency  No hot/cold intolerance or polyria  +  anxiety or depression    PROBLEM LIST  Patient Active Problem List    Diagnosis Date Noted     Morbid (severe) obesity due to excess calories (H) 08/08/2015     Priority: High     Lumbar spondylosis 11/04/2019     Priority: Medium     Added automatically from request for surgery [id number]       Spinal epidural abscess 08/19/2019     Priority: Medium     Respiratory bronchiolitis interstitial lung disease (H)      Priority: Medium     Subcutaneous emphysema (H) 04/05/2018     Priority: Medium     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) 10/13/2017     Priority: Medium     Interstitial lung disease (H) 11/17/2016     Priority: Medium     Overview:   Patient sees Pulm       Esophageal stricture 07/18/2016     Priority: Medium     Overview:   With Hiatal hernia; on Acid blocker lifelong       Stress 06/15/2016     Priority: Medium     Hypoxia 06/08/2016     Priority: Medium     Onychomycosis 06/01/2016     Priority: Medium     Restless leg syndrome 06/01/2016     Priority: Medium     Smoker 11/02/2015     Priority: Medium     Dental abscess 08/08/2015     Priority: Medium     Facial cellulitis 03/05/2015     Priority: Medium     Chronic midline low back pain 11/10/2014     Priority: Medium     Diagnosis updated by automated process. Provider to review and confirm.       HTN (hypertension) 01/28/2013     Priority: Medium     Borderline personality disorder (H) 01/12/2011     Priority: Medium     GERD (gastroesophageal reflux disease) 03/23/2010     Priority: Medium     Moderate persistent asthma without complication 03/23/2010     Priority: Medium     Overview:   Patient sees Pulm       Seasonal allergies 03/23/2010     Priority: Medium       PERTINENT PAST MEDICAL HISTORY:  See HPI  Past Medical History:   Diagnosis Date     Allergic rhinitis      Anemia      Arthritis      Asthma     copd     Dental abscess 8-2015     Depressive disorder      Gastroesophageal reflux disease      History of emphysema      Hoarseness       Hypertension      Morbid obesity with BMI of 40.0-44.9, adult (H)      Obstructive sleep apnea      Respiratory bronchiolitis interstitial lung disease (H)      Sleep apnea        PREVIOUS SURGERIES:  Hysterectomy  Laminectomy   Past Surgical History:   Procedure Laterality Date     COLONOSCOPY       ENT SURGERY       EXCISE LESION INTRAORAL Bilateral 10/3/2018    Procedure: EXCISE LESION INTRAORAL;  Wide Local Excision Of of Left Oral Cavity Ulcer;  Surgeon: Morro Mijares MD;  Location: [facility] OR     HC DRAIN SKIN ABSCESS SIMPLE/SINGLE  3/16/2012    Procedure:INCISION AND DRAINAGE, ABSCESS, SIMPLE; Surgeon:CHRISTIANO HANCOCK; Location: GI     HEAD & NECK SURGERY       HYSTERECTOMY       INCISION AND DRAINAGE ABDOMEN WASHOUT, COMBINED       LAMINECTOMY THORACIC ONE LEVEL N/A 8/19/2019    Procedure: LAMINECTOMY, SPINE, THORACIC, 11-12 and Part of Lumbar 1, DRAINAGE OF EPIDURAL ABCESS, Epidural Drain Placement X 2;  Surgeon: Yadiel Beal MD;  Location: [facility] OR       PREVIOUS ENDOSCOPY:  Last colonoscopy 2015, normal. Hx of abnormal polyps    ALLERGIES:     Allergies   Allergen Reactions     Bee Venom Anaphylaxis     Bees      Doxycycline Anaphylaxis     Patient thinks it may have been just nausea and vomiting, however unable to confirm     Erythromycin Anaphylaxis and Shortness Of Breath     Other reaction(s): Vomiting     Hydrocodone-Acetaminophen Itching       PERTINENT MEDICATIONS:    Current Outpatient Medications:      albuterol (PROAIR HFA, PROVENTIL HFA, VENTOLIN HFA) 108 (90 BASE) MCG/ACT inhaler, Inhale 2 puffs into the lungs every 6 hours as needed for shortness of breath / dyspnea or wheezing, Disp: , Rfl:      ARIPiprazole (ABILIFY) 15 MG tablet, Take 15 mg by mouth daily , Disp: , Rfl:      cholecalciferol ( ULTRA STRENGTH) 2000 units CAPS, TAKE ONE CAPSULE BY MOUTH DAILY IN AM, Disp: , Rfl:      cyclobenzaprine (FLEXERIL) 10 MG tablet, Take 1 tablet (10 mg) by mouth 3 times  daily, Disp: 30 tablet, Rfl: 0     DULoxetine (CYMBALTA) 60 MG capsule, Take 120 mg by mouth daily , Disp: , Rfl:      ferrous sulfate (FEROSUL) 325 (65 Fe) MG tablet, Take 325 mg by mouth daily, Disp: , Rfl: 0     fluticasone-salmeterol (ADVAIR) 500-50 MCG/DOSE diskus inhaler, Inhale 1 puff into the lungs every 12 hours, Disp: , Rfl:      folic acid (FOLVITE) 1 MG tablet, Take 1 tablet (1 mg) by mouth daily, Disp: 90 tablet, Rfl: 3     lisinopril-hydrochlorothiazide (PRINZIDE/ZESTORETIC) 20-25 MG per tablet, Take 1 tablet by mouth daily, Disp: 30 tablet, Rfl: 0     methotrexate 2.5 MG tablet, Take 9 tabs once weekly, Disp: 96 tablet, Rfl: 2     montelukast (SINGULAIR) 10 MG tablet, Take 10 mg by mouth At Bedtime, Disp: , Rfl:      OXYGEN-HELIUM IN, 2-3L PRN during the day, 3L @ night, Disp: , Rfl:      predniSONE (DELTASONE) 5 MG tablet, TAKE 3 TABLETS BY MOUTH DAILY FOR 10 DAYS, THEN 2 TABS DAILY FOR 10 DAYS, THEN OFF., Disp: 50 tablet, Rfl: 1     rOPINIRole (REQUIP) 2 MG tablet, Take 2 mg by mouth nightly as needed , Disp: , Rfl:      vitamin C 500 MG TABS, Take 1 tablet (500 mg) by mouth 2 times daily, Disp: , Rfl:      zinc sulfate (ZINCATE) 220 (50 Zn) MG capsule, Take 1 capsule (220 mg) by mouth daily, Disp: , Rfl:   No current facility-administered medications for this visit.     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  Social History     Socioeconomic History     Marital status: Single     Spouse name: Not on file     Number of children: Not on file     Years of education: Not on file     Highest education level: Not on file   Occupational History     Not on file   Social Needs     Financial resource strain: Not on file     Food insecurity:      "Worry: Not on file     Inability: Not on file     Transportation needs:     Medical: Not on file     Non-medical: Not on file   Tobacco Use     Smoking status: Current Every Day Smoker     Packs/day: 1.00     Years: 30.00     Pack years: 30.00     Types: Cigarettes     Start date: 1/1/1996     Smokeless tobacco: Never Used   Substance and Sexual Activity     Alcohol use: No     Binge frequency: Monthly     Drug use: No     Sexual activity: Never   Lifestyle     Physical activity:     Days per week: Not on file     Minutes per session: Not on file     Stress: Not on file   Relationships     Social connections:     Talks on phone: Not on file     Gets together: Not on file     Attends Yazidi service: Not on file     Active member of club or organization: Not on file     Attends meetings of clubs or organizations: Not on file     Relationship status: Not on file     Intimate partner violence:     Fear of current or ex partner: Not on file     Emotionally abused: Not on file     Physically abused: Not on file     Forced sexual activity: Not on file   Other Topics Concern     Parent/sibling w/ CABG, MI or angioplasty before 65F 55M? Not Asked   Social History Narrative     Not on file       FAMILY HISTORY:  FH of CRC: None  FH of IBD: None  Family History   Problem Relation Age of Onset     Other Cancer Father         base of tongue cancer at age ~65     Hypertension Father      Back Pain Father      Asthma Mother        Past/family/social history reviewed and no changes    PHYSICAL EXAMINATION:  Constitutional: aaox3, cooperative, pleasant, not dyspneic/diaphoretic, no acute distress  Vitals reviewed: /75   Pulse 81   Temp 99  F (37.2  C) (Oral)   Ht 1.626 m (5' 4.02\")   Wt 114 kg (251 lb 6.4 oz)   LMP  (LMP Unknown)   SpO2 95%   BMI 43.13 kg/m     Wt:   Wt Readings from Last 2 Encounters:   11/20/19 114 kg (251 lb 6.4 oz)   11/19/19 114.8 kg (253 lb)      Eyes: Sclera " anicteric/injected  Ears/nose/mouth/throat: Normal oropharynx without ulcers or exudate, mucus membranes moist, hearing intact  Neck: supple, thyroid normal size  CV: No edema  Respiratory: Unlabored breathing  Lymph: No axillary, submandibular, supraclavicular or inguinal lymphadenopathy  Abd: obese, mild pain to palpation to epigastric area, nondistended, +bs, no hepatosplenomegaly, no peritoneal signs  Skin: warm, perfused, no jaundice  Psych: Normal affect  MSK: Normal gait      PERTINENT STUDIES:    Orders Only on 09/01/2019   Component Date Value Ref Range Status     WBC 09/23/2019 6.4  4.0 - 11.0 10e9/L Final     RBC Count 09/23/2019 4.04  3.8 - 5.2 10e12/L Final     Hemoglobin 09/23/2019 12.7  11.7 - 15.7 g/dL Final     Hematocrit 09/23/2019 38.9  35.0 - 47.0 % Final     MCV 09/23/2019 96  78 - 100 fl Final     MCH 09/23/2019 31.4  26.5 - 33.0 pg Final     MCHC 09/23/2019 32.6  31.5 - 36.5 g/dL Final     RDW 09/23/2019 13.6  10.0 - 15.0 % Final     Platelet Count 09/23/2019 220  150 - 450 10e9/L Final     Diff Method 09/23/2019 Automated Method   Final     % Neutrophils 09/23/2019 64.1  % Final     % Lymphocytes 09/23/2019 21.9  % Final     % Monocytes 09/23/2019 8.3  % Final     % Eosinophils 09/23/2019 4.4  % Final     % Basophils 09/23/2019 0.8  % Final     % Immature Granulocytes 09/23/2019 0.5  % Final     Nucleated RBCs 09/23/2019 0  0 /100 Final     Absolute Neutrophil 09/23/2019 4.1  1.6 - 8.3 10e9/L Final     Absolute Lymphocytes 09/23/2019 1.4  0.8 - 5.3 10e9/L Final     Absolute Monocytes 09/23/2019 0.5  0.0 - 1.3 10e9/L Final     Absolute Eosinophils 09/23/2019 0.3  0.0 - 0.7 10e9/L Final     Absolute Basophils 09/23/2019 0.1  0.0 - 0.2 10e9/L Final     Abs Immature Granulocytes 09/23/2019 0.0  0 - 0.4 10e9/L Final     Absolute Nucleated RBC 09/23/2019 0.0   Final     Sodium 09/23/2019 140  133 - 144 mmol/L Final     Potassium 09/23/2019 3.5  3.4 - 5.3 mmol/L Final     Chloride 09/23/2019 106  94  - 109 mmol/L Final     Carbon Dioxide 09/23/2019 31  20 - 32 mmol/L Final     Anion Gap 09/23/2019 3  3 - 14 mmol/L Final     Glucose 09/23/2019 96  70 - 99 mg/dL Final     Urea Nitrogen 09/23/2019 9  7 - 30 mg/dL Final     Creatinine 09/23/2019 0.57  0.52 - 1.04 mg/dL Final     GFR Estimate 09/23/2019 >90  >60 mL/min/[1.73_m2] Final     GFR Estimate If Black 09/23/2019 >90  >60 mL/min/[1.73_m2] Final     Calcium 09/23/2019 8.8  8.5 - 10.1 mg/dL Final     Bilirubin Total 09/23/2019 0.2  0.2 - 1.3 mg/dL Final     Albumin 09/23/2019 3.5  3.4 - 5.0 g/dL Final     Protein Total 09/23/2019 6.6* 6.8 - 8.8 g/dL Final     Alkaline Phosphatase 09/23/2019 86  40 - 150 U/L Final     ALT 09/23/2019 8  0 - 50 U/L Final     AST 09/23/2019 18  0 - 45 U/L Final     CRP Inflammation 09/23/2019 15.4* 0.0 - 8.0 mg/L Final     Bilirubin Direct 09/23/2019 <0.1  0.0 - 0.2 mg/dL Final     Magnesium 09/23/2019 1.9  1.6 - 2.3 mg/dL Final     Phosphorus 09/23/2019 3.0  2.5 - 4.5 mg/dL Final     Triglycerides 09/23/2019 68  <150 mg/dL Final     Sed Rate 09/23/2019 15  0 - 20 mm/h Final     Again, thank you for allowing me to participate in the care of your patient.      Sincerely,    Abhi Villafuerte PA-C

## 2019-11-20 NOTE — PROGRESS NOTES
GI CLINIC VISIT    CC/REFERRING MD:  Neena Tam  REASON FOR CONSULTATION: abdominal pain    ASSESSMENT/PLAN:  49-year-old female with past medical history to include depression, GERD (on omeprazole, unsure dose), rheumatoid arthritis on methotrexate weekly and folic acid daily not currently on prednisone, hypertension on lisinopril, interstitial lung disease, cervical spinal stenosis presents to the GI clinic for consultation regarding epigastric abdominal pain.    1. Abdominal pain: Patient's description of abdominal pain highly suggest musculoskeletal etiology with ability to reproduce the pain upon palpation and coughing.  There is no association with eating or bowel movements.  Pain has been present for the last 2 weeks.  There are several lifestyle factors that could also be aggravating the abdominal pain such as 6 cans of Mountain Dew's a day and smoking 1 pack a day.  She is not experiencing any associated symptoms such as dysphasia or classic reflux although she may have some component of silent reflux.  At this time there is not a strong indication to move directly to an endoscopic assessment.  We will plan to monitor her symptoms for the next several weeks.  If pain continues to persist then we could discuss possibility of imaging versus endoscopic assessment.  For management of the pain she describes, a trial of heat and/or ice packs as well as avoiding irritants such as caffeine and smoking may help as well.  --Close monitoring for the next several weeks with eliminating irritants  --Reevaluate at follow-up, if persistent then determine if imaging and/or endoscopic assessment would be needed at that time.    2. Colorectal cancer screening: History of adenomatous polyp in the past.  Normal colonoscopy in 2015 with recommendation to repeat in 5 years, due in 2020.    RTC 6-8 weeks    Thank you for this consultation.  It was a pleasure to participate in the care of this patient; please contact us  with any further questions.       Abhi Villafuerte PA-C  Division of Gastroenterology, Hepatology and Nutrition  [facility]      HPI  49-year-old female with past medical history to include depression, GERD (on omeprazole, unsure dose), rheumatoid arthritis on methotrexate weekly and folic acid daily not currently on prednisone, hypertension on lisinopril, interstitial lung disease, cervical spinal stenosis presents to the GI clinic for consultation regarding epigastric abdominal pain.  Patient describes his pain has been going on for the last 2 weeks and wakes her up at night.  She feels that the pain gets worse throughout the day.  She describes it as a numb feeling inside her abdomen.  She can reproduce the pain with palpation.  Pain is also worsened by coughing or moving.  Rest sometimes will improve the pain.  She denies any nausea, vomiting or dysphasia accompanied by this pain.  Bowel movements are average 3 formed to soft stools per day, [place] 4-5.  She denies any blood in the stool.  She had a normal colonoscopy in 2015 and recommended to repeat in 5 years.  I do note that she admits to high caffeine intake with 6 Mountain Dew drinks per day.  She smokes 1 pack/day.    ROS:    No fevers or chills  No weight loss  No blurry vision, double vision or change in vision  No sore throat  No lymphadenopathy  No headache, paraesthesias, or weakness in a limb  No shortness of breath or wheezing  No chest pain or pressure  No arthralgias or myalgias  No rashes or skin changes  No odynophagia or dysphagia  No BRBPR, hematochezia, melena  No dysuria, frequency or urgency  No hot/cold intolerance or polyria  + anxiety or depression    PROBLEM LIST  Patient Active Problem List    Diagnosis Date Noted     Morbid (severe) obesity due to excess calories (H) 08/08/2015     Priority: High     Lumbar spondylosis 11/04/2019     Priority: Medium     Added automatically from request for surgery [id number]       Spinal  epidural abscess 08/19/2019     Priority: Medium     Respiratory bronchiolitis interstitial lung disease (H)      Priority: Medium     Subcutaneous emphysema (H) 04/05/2018     Priority: Medium     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) 10/13/2017     Priority: Medium     Interstitial lung disease (H) 11/17/2016     Priority: Medium     Overview:   Patient sees Pulm       Esophageal stricture 07/18/2016     Priority: Medium     Overview:   With Hiatal hernia; on Acid blocker lifelong       Stress 06/15/2016     Priority: Medium     Hypoxia 06/08/2016     Priority: Medium     Onychomycosis 06/01/2016     Priority: Medium     Restless leg syndrome 06/01/2016     Priority: Medium     Smoker 11/02/2015     Priority: Medium     Dental abscess 08/08/2015     Priority: Medium     Facial cellulitis 03/05/2015     Priority: Medium     Chronic midline low back pain 11/10/2014     Priority: Medium     Diagnosis updated by automated process. Provider to review and confirm.       HTN (hypertension) 01/28/2013     Priority: Medium     Borderline personality disorder (H) 01/12/2011     Priority: Medium     GERD (gastroesophageal reflux disease) 03/23/2010     Priority: Medium     Moderate persistent asthma without complication 03/23/2010     Priority: Medium     Overview:   Patient sees Pulm       Seasonal allergies 03/23/2010     Priority: Medium       PERTINENT PAST MEDICAL HISTORY:  See HPI  Past Medical History:   Diagnosis Date     Allergic rhinitis      Anemia      Arthritis      Asthma     copd     Dental abscess 8-2015     Depressive disorder      Gastroesophageal reflux disease      History of emphysema      Hoarseness      Hypertension      Morbid obesity with BMI of 40.0-44.9, adult (H)      Obstructive sleep apnea      Respiratory bronchiolitis interstitial lung disease (H)      Sleep apnea        PREVIOUS SURGERIES:  Hysterectomy  Laminectomy   Past Surgical History:   Procedure Laterality Date     COLONOSCOPY        ENT SURGERY       EXCISE LESION INTRAORAL Bilateral 10/3/2018    Procedure: EXCISE LESION INTRAORAL;  Wide Local Excision Of of Left Oral Cavity Ulcer;  Surgeon: Morro Mijares MD;  Location: [facility] OR     HC DRAIN SKIN ABSCESS SIMPLE/SINGLE  3/16/2012    Procedure:INCISION AND DRAINAGE, ABSCESS, SIMPLE; Surgeon:CHRISTIANO HANCOCK; Location: GI     HEAD & NECK SURGERY       HYSTERECTOMY       INCISION AND DRAINAGE ABDOMEN WASHOUT, COMBINED       LAMINECTOMY THORACIC ONE LEVEL N/A 8/19/2019    Procedure: LAMINECTOMY, SPINE, THORACIC, 11-12 and Part of Lumbar 1, DRAINAGE OF EPIDURAL ABCESS, Epidural Drain Placement X 2;  Surgeon: Yadiel Beal MD;  Location: [facility] OR       PREVIOUS ENDOSCOPY:  Last colonoscopy 2015, normal. Hx of abnormal polyps    ALLERGIES:     Allergies   Allergen Reactions     Bee Venom Anaphylaxis     Bees      Doxycycline Anaphylaxis     Patient thinks it may have been just nausea and vomiting, however unable to confirm     Erythromycin Anaphylaxis and Shortness Of Breath     Other reaction(s): Vomiting     Hydrocodone-Acetaminophen Itching       PERTINENT MEDICATIONS:    Current Outpatient Medications:      albuterol (PROAIR HFA, PROVENTIL HFA, VENTOLIN HFA) 108 (90 BASE) MCG/ACT inhaler, Inhale 2 puffs into the lungs every 6 hours as needed for shortness of breath / dyspnea or wheezing, Disp: , Rfl:      ARIPiprazole (ABILIFY) 15 MG tablet, Take 15 mg by mouth daily , Disp: , Rfl:      cholecalciferol ( ULTRA STRENGTH) 2000 units CAPS, TAKE ONE CAPSULE BY MOUTH DAILY IN AM, Disp: , Rfl:      cyclobenzaprine (FLEXERIL) 10 MG tablet, Take 1 tablet (10 mg) by mouth 3 times daily, Disp: 30 tablet, Rfl: 0     DULoxetine (CYMBALTA) 60 MG capsule, Take 120 mg by mouth daily , Disp: , Rfl:      ferrous sulfate (FEROSUL) 325 (65 Fe) MG tablet, Take 325 mg by mouth daily, Disp: , Rfl: 0     fluticasone-salmeterol (ADVAIR) 500-50 MCG/DOSE diskus inhaler, Inhale 1 puff into the  lungs every 12 hours, Disp: , Rfl:      folic acid (FOLVITE) 1 MG tablet, Take 1 tablet (1 mg) by mouth daily, Disp: 90 tablet, Rfl: 3     lisinopril-hydrochlorothiazide (PRINZIDE/ZESTORETIC) 20-25 MG per tablet, Take 1 tablet by mouth daily, Disp: 30 tablet, Rfl: 0     methotrexate 2.5 MG tablet, Take 9 tabs once weekly, Disp: 96 tablet, Rfl: 2     montelukast (SINGULAIR) 10 MG tablet, Take 10 mg by mouth At Bedtime, Disp: , Rfl:      OXYGEN-HELIUM IN, 2-3L PRN during the day, 3L @ night, Disp: , Rfl:      predniSONE (DELTASONE) 5 MG tablet, TAKE 3 TABLETS BY MOUTH DAILY FOR 10 DAYS, THEN 2 TABS DAILY FOR 10 DAYS, THEN OFF., Disp: 50 tablet, Rfl: 1     rOPINIRole (REQUIP) 2 MG tablet, Take 2 mg by mouth nightly as needed , Disp: , Rfl:      vitamin C 500 MG TABS, Take 1 tablet (500 mg) by mouth 2 times daily, Disp: , Rfl:      zinc sulfate (ZINCATE) 220 (50 Zn) MG capsule, Take 1 capsule (220 mg) by mouth daily, Disp: , Rfl:   No current facility-administered medications for this visit.     Facility-Administered Medications Ordered in Other Visits:      Lidocaine 1 % injection 0.5-5 mL, 0.5-5 mL, Other, Once PRN, Gutierrez Candelario MD     sodium chloride (PF) 0.9% PF flush 5-50 mL, 5-50 mL, Intracatheter, Once PRN, Gutierrez Candelario MD    SOCIAL HISTORY:  Currently not working. [employer]  Social History     Socioeconomic History     Marital status: Single     Spouse name: Not on file     Number of children: Not on file     Years of education: Not on file     Highest education level: Not on file   Occupational History     Not on file   Social Needs     Financial resource strain: Not on file     Food insecurity:     Worry: Not on file     Inability: Not on file     Transportation needs:     Medical: Not on file     Non-medical: Not on file   Tobacco Use     Smoking status: Current Every Day Smoker     Packs/day: 1.00     Years: 30.00     Pack years: 30.00     Types: Cigarettes     Start date: 1/1/1996      "Smokeless tobacco: Never Used   Substance and Sexual Activity     Alcohol use: No     Binge frequency: Monthly     Drug use: No     Sexual activity: Never   Lifestyle     Physical activity:     Days per week: Not on file     Minutes per session: Not on file     Stress: Not on file   Relationships     Social connections:     Talks on phone: Not on file     Gets together: Not on file     Attends Voodoo service: Not on file     Active member of club or organization: Not on file     Attends meetings of clubs or organizations: Not on file     Relationship status: Not on file     Intimate partner violence:     Fear of current or ex partner: Not on file     Emotionally abused: Not on file     Physically abused: Not on file     Forced sexual activity: Not on file   Other Topics Concern     Parent/sibling w/ CABG, MI or angioplasty before 65F 55M? Not Asked   Social History Narrative     Not on file       FAMILY HISTORY:  FH of CRC: None  FH of IBD: None  Family History   Problem Relation Age of Onset     Other Cancer Father         base of tongue cancer at age ~65     Hypertension Father      Back Pain Father      Asthma Mother        Past/family/social history reviewed and no changes    PHYSICAL EXAMINATION:  Constitutional: aaox3, cooperative, pleasant, not dyspneic/diaphoretic, no acute distress  Vitals reviewed: /75   Pulse 81   Temp 99  F (37.2  C) (Oral)   Ht 1.626 m (5' 4.02\")   Wt 114 kg (251 lb 6.4 oz)   LMP  (LMP Unknown)   SpO2 95%   BMI 43.13 kg/m    Wt:   Wt Readings from Last 2 Encounters:   11/20/19 114 kg (251 lb 6.4 oz)   11/19/19 114.8 kg (253 lb)      Eyes: Sclera anicteric/injected  Ears/nose/mouth/throat: Normal oropharynx without ulcers or exudate, mucus membranes moist, hearing intact  Neck: supple, thyroid normal size  CV: No edema  Respiratory: Unlabored breathing  Lymph: No axillary, submandibular, supraclavicular or inguinal lymphadenopathy  Abd: obese, mild pain to palpation to " epigastric area, nondistended, +bs, no hepatosplenomegaly, no peritoneal signs  Skin: warm, perfused, no jaundice  Psych: Normal affect  MSK: Normal gait      PERTINENT STUDIES:    Orders Only on 09/01/2019   Component Date Value Ref Range Status     WBC 09/23/2019 6.4  4.0 - 11.0 10e9/L Final     RBC Count 09/23/2019 4.04  3.8 - 5.2 10e12/L Final     Hemoglobin 09/23/2019 12.7  11.7 - 15.7 g/dL Final     Hematocrit 09/23/2019 38.9  35.0 - 47.0 % Final     MCV 09/23/2019 96  78 - 100 fl Final     MCH 09/23/2019 31.4  26.5 - 33.0 pg Final     MCHC 09/23/2019 32.6  31.5 - 36.5 g/dL Final     RDW 09/23/2019 13.6  10.0 - 15.0 % Final     Platelet Count 09/23/2019 220  150 - 450 10e9/L Final     Diff Method 09/23/2019 Automated Method   Final     % Neutrophils 09/23/2019 64.1  % Final     % Lymphocytes 09/23/2019 21.9  % Final     % Monocytes 09/23/2019 8.3  % Final     % Eosinophils 09/23/2019 4.4  % Final     % Basophils 09/23/2019 0.8  % Final     % Immature Granulocytes 09/23/2019 0.5  % Final     Nucleated RBCs 09/23/2019 0  0 /100 Final     Absolute Neutrophil 09/23/2019 4.1  1.6 - 8.3 10e9/L Final     Absolute Lymphocytes 09/23/2019 1.4  0.8 - 5.3 10e9/L Final     Absolute Monocytes 09/23/2019 0.5  0.0 - 1.3 10e9/L Final     Absolute Eosinophils 09/23/2019 0.3  0.0 - 0.7 10e9/L Final     Absolute Basophils 09/23/2019 0.1  0.0 - 0.2 10e9/L Final     Abs Immature Granulocytes 09/23/2019 0.0  0 - 0.4 10e9/L Final     Absolute Nucleated RBC 09/23/2019 0.0   Final     Sodium 09/23/2019 140  133 - 144 mmol/L Final     Potassium 09/23/2019 3.5  3.4 - 5.3 mmol/L Final     Chloride 09/23/2019 106  94 - 109 mmol/L Final     Carbon Dioxide 09/23/2019 31  20 - 32 mmol/L Final     Anion Gap 09/23/2019 3  3 - 14 mmol/L Final     Glucose 09/23/2019 96  70 - 99 mg/dL Final     Urea Nitrogen 09/23/2019 9  7 - 30 mg/dL Final     Creatinine 09/23/2019 0.57  0.52 - 1.04 mg/dL Final     GFR Estimate 09/23/2019 >90  >60  mL/min/[1.73_m2] Final     GFR Estimate If Black 09/23/2019 >90  >60 mL/min/[1.73_m2] Final     Calcium 09/23/2019 8.8  8.5 - 10.1 mg/dL Final     Bilirubin Total 09/23/2019 0.2  0.2 - 1.3 mg/dL Final     Albumin 09/23/2019 3.5  3.4 - 5.0 g/dL Final     Protein Total 09/23/2019 6.6* 6.8 - 8.8 g/dL Final     Alkaline Phosphatase 09/23/2019 86  40 - 150 U/L Final     ALT 09/23/2019 8  0 - 50 U/L Final     AST 09/23/2019 18  0 - 45 U/L Final     CRP Inflammation 09/23/2019 15.4* 0.0 - 8.0 mg/L Final     Bilirubin Direct 09/23/2019 <0.1  0.0 - 0.2 mg/dL Final     Magnesium 09/23/2019 1.9  1.6 - 2.3 mg/dL Final     Phosphorus 09/23/2019 3.0  2.5 - 4.5 mg/dL Final     Triglycerides 09/23/2019 68  <150 mg/dL Final     Sed Rate 09/23/2019 15  0 - 20 mm/h Final

## 2019-11-20 NOTE — PATIENT INSTRUCTIONS
It was a pleasure taking care of you today.  I've included a brief summary of our discussion and care plan from today's visit below.  Please review this information with your primary care provider.  ______________________________________________________________________    My recommendations are summarized as follows:    -- Try heat and/or ice to area  -- Call with fevers or worsening symptoms  -- Decrease or try to eliminate caffeine, smoking and etoh to see if this helps with improving pain  -- Reevaluate in 6-8 weeks.     -- Continue omeprazole   -- If continuing to have heartburn, can also take zantac 150 mg twice per day as needed or pepcid 20 mg twice per day as needed    Reflux/heartburn/GERD relief:  1) Prop actual bed up, not just with pillows.  Propping up with just pillows can actually make things worse if it is causing you to bend at the waist while sleeping  2) Avoid alcohol, as this causes relaxation of the sphincter and makes it easier for food to travel up esophagus.  If you do drink alcohol, do not drink before bed or before meals.  3)  Eat small meals. Avoid overeating.  4) Avoid triggers such as fatty foods, processed foods, and high fructose corn syrup (or food that contain these)  5) Weight loss     Return to GI Clinic in 6-8 weeks to review your progress.    ______________________________________________________________________    Who do I call with any questions after my visit?  Please be in touch if there are any further questions that arise following today's visit.  There are multiple ways to contact your gastroenterology care team.        During business hours, you may reach a Gastroenterology nurse at [phone], option 3.       To schedule or reschedule an appointment, please call [phone].       You can always send a secure message through [employer].  [employer] messages are answered by your nurse or doctor typically within 24 hours.  Please allow extra time on weekends and holidays.         For urgent/emergent questions after business hours, you may reach the on-call GI Fellow by contacting the [facility]  at [phone].      In order for your refill to be processed in a timely fashion, it is your responsibility to ensure you follow the recommendations from your provider regarding your laboratory studies and follow up appointments.       How will I get the results of any tests ordered?    You will receive all of your results.  If you have signed up for [employer], any tests ordered at your visit will be available to you after your physician reviews them.  Typically this takes 1-2 weeks.  If there are urgent results that require a change in your care plan, your physician or nurse will call you to discuss the next steps.      What is [employer]?  [employer] is a secure way for you to access all of your healthcare records from the [facility].  It is a web based computer program, so you can sign on to it from any location.  It also allows you to send secure messages to your care team.  I recommend signing up for [employer] Eyes access if you have not already done so and are comfortable with using a computer.      How to I schedule a follow-up visit?  If you did not schedule a follow-up visit today, please call [phone] to schedule a follow-up office visit.        Sincerely,    Abhi Villafuerte PA-C  [facility]  Division of Gastroenterology

## 2019-11-20 NOTE — NURSING NOTE
"Chief Complaint   Patient presents with     New Patient     New consult, abdominal pain       Vitals:    11/20/19 0849   BP: 133/75   Pulse: 81   Temp: 99  F (37.2  C)   TempSrc: Oral   SpO2: 95%   Weight: 114 kg (251 lb 6.4 oz)   Height: 1.626 m (5' 4.02\")       Body mass index is 43.13 kg/m .    Zainab Grossman CMA    "

## 2019-11-22 ENCOUNTER — TELEPHONE (OUTPATIENT)
Dept: RHEUMATOLOGY | Facility: CLINIC | Age: 49
End: 2019-11-22

## 2019-11-22 NOTE — TELEPHONE ENCOUNTER
Chief Complaint   Patient presents with     Previsit     spoke to patient     Spoke to patient , called with a reminder about there upcoming appointment , also instructed to bring medications or medication list.    Kaity Erickson, CMA

## 2019-11-26 ENCOUNTER — OFFICE VISIT (OUTPATIENT)
Dept: RHEUMATOLOGY | Facility: CLINIC | Age: 49
End: 2019-11-26
Attending: INTERNAL MEDICINE
Payer: COMMERCIAL

## 2019-11-26 VITALS
WEIGHT: 257.6 LBS | TEMPERATURE: 98.2 F | SYSTOLIC BLOOD PRESSURE: 135 MMHG | BODY MASS INDEX: 43.98 KG/M2 | HEART RATE: 96 BPM | HEIGHT: 64 IN | OXYGEN SATURATION: 94 % | DIASTOLIC BLOOD PRESSURE: 86 MMHG

## 2019-11-26 DIAGNOSIS — M19.90 INFLAMMATORY ARTHRITIS: ICD-10-CM

## 2019-11-26 PROCEDURE — G0463 HOSPITAL OUTPT CLINIC VISIT: HCPCS | Mod: ZF

## 2019-11-26 ASSESSMENT — MIFFLIN-ST. JEOR: SCORE: 1783.47

## 2019-11-26 ASSESSMENT — PAIN SCALES - GENERAL: PAINLEVEL: MODERATE PAIN (4)

## 2019-11-26 NOTE — NURSING NOTE
"Chief Complaint   Patient presents with     RECHECK     Lumbar spondylosis     /86   Pulse 96   Temp 98.2  F (36.8  C) (Oral)   Ht 1.626 m (5' 4\")   Wt 116.8 kg (257 lb 9.6 oz)   LMP  (LMP Unknown)   SpO2 94%   BMI 44.22 kg/m    Lisa Fan CMA    "

## 2019-11-26 NOTE — LETTER
"2019    RE: Zayra Ramirez  [address] [place] [address]     [facility]  Rheumatology Clinic  Panchito Saenz MD  2019     Name: Zayra Ramirez  MRN: [id number]  Age: 49 year old  : 1970  Referring provider: Aime Mason    Assessment and Plan:  #  Seronegative inflammatory arthritis:  Patient relates resolution of prior ankle pain and right shoulder pain. Exam shows impingement, but intact passive range of motion at the left shoulder. Blood work from  revealed normal basic metabolic panel, CRP elevated at 15.4, and normal CBC. Sed rate was 15. MR of the lumbar spine showed no evidence of epidural abscess after evacuation of the T12-L2 laminectomy.    Inflammatory arthritis is overall improved on methotrexate monotherapy. I recommend continuing methotrexate 22.5 mg once weekly. While on the drug, patient should use folic acid 1 mg daily and undergo CBC, LFTs, and creatinine every 3-4 months.     # Impingement, left shoulder:  Patient is already performing daily physical therapy exercises. I urged her to continue and to add daily \"cane raising\" exercises to maintain flexion range of motion on the left. We may discuss subacromial injection if left shoulder pain persists. However, I recommend that patient address chronic right-sided low back pain first, and then address left shoulder discomfort treatment with possible injections or other invasive treatment later.     # Right-sided low back pain:  I agree with plans for repeat radiofrequency ablation to address pain arising from osteoarthrosis and neural impingement.     # Tobacco abuse:   I counseled the patient to quit smoking. Return to clinic in 6 months with Keyon Branham.    Follow-up: Return in about 6 months (around 2020) for follow up with Keyon Branham.    HPI:   Zayra Ramirez is a 49 year old female with a history including seronegative inflammatory arthritis and lower back pain who presents for " follow-up. I last saw the patient on 09/13/2019, at which time seronegative inflammatory arthritis was flaring with gradually increasing bilateral shoulder and ankle discomfort. We planned to start methotrexate 12.5 mg (half dose) now, and resume full dose 22.5 mg weekly at week 2, coincident with planned final dose of Ancef given for a complete six week course.    Today, the patient reports some discomfort in the left shoulder, but is otherwise well. She states she had a short course of prednisone for a couple weeks, but has been off of this now for a matter of weeks. She explains that her short course of prednisone was helpful. She noted the return of left shoulder pain after stopping prednisone as well. She reports that she has also had 4 doses of methotrexate (9 tablets) since our last visit. She also takes folic acid daily with the exception of the days she takes methotrexate.     She reports that she has morning joint stiffness for around one hour and is unsure if this is improved. She states that she takes naproxen for back pain. She denies significant ankle pain or other new symptoms or concerns. She states that she performs physical therapy exercises daily for her shoulder including stretching and wall-walking.     Patient was seen by Dr. Elizabeth in Infectious Disease on September 25th in follow-up of spinal epidural abscess.  Normalization of inflammatory markers and markedly improved symptoms were recorded. Completion of a 6-week course of cefazolin for a spinal abscess was recommended.     She reports that her right fingers/hand have seen to move or twitch as noticed by her family.     Patient saw Dr. Florian in [facility] in follow-up of lumbar spondylosis on November 4th. Chronic lower right back pain was reported. Improvement in pain after prior RF ablation at bilateral L2-L5 was noted. Plans to repeat the radiofrequency ablation procedure were arranged.     Review of Systems:   Pertinent items are  noted in HPI or as below, remainder of complete ROS is negative.      No recent problems with hearing or vision. No swallowing problems.   No breathing difficulty, shortness of breath, coughing, or wheezing.  No chest pain or palpitations.  No heart burn, indigestion, abdominal pain, nausea, vomiting, diarrhea.  No urination problems, no bloody, cloudy urine, no dysuria.  No numbing, tingling, weakness.  No headaches or confusion.  No rashes. No easy bleeding or bruising.     Active Medications:   Current Outpatient Medications:      albuterol (PROAIR HFA, PROVENTIL HFA, VENTOLIN HFA) 108 (90 BASE) MCG/ACT inhaler, Inhale 2 puffs into the lungs every 6 hours as needed for shortness of breath / dyspnea or wheezing, Disp: , Rfl:      ARIPiprazole (ABILIFY) 15 MG tablet, Take 15 mg by mouth daily , Disp: , Rfl:      cholecalciferol ( ULTRA STRENGTH) 2000 units CAPS, TAKE ONE CAPSULE BY MOUTH DAILY IN AM, Disp: , Rfl:      cyclobenzaprine (FLEXERIL) 10 MG tablet, Take 1 tablet (10 mg) by mouth 3 times daily, Disp: 30 tablet, Rfl: 0     DULoxetine (CYMBALTA) 60 MG capsule, Take 120 mg by mouth daily , Disp: , Rfl:      ferrous sulfate (FEROSUL) 325 (65 Fe) MG tablet, Take 325 mg by mouth daily, Disp: , Rfl: 0     fluticasone-salmeterol (ADVAIR) 500-50 MCG/DOSE diskus inhaler, Inhale 1 puff into the lungs every 12 hours, Disp: , Rfl:      folic acid (FOLVITE) 1 MG tablet, Take 1 tablet (1 mg) by mouth daily, Disp: 90 tablet, Rfl: 3     lisinopril-hydrochlorothiazide (PRINZIDE/ZESTORETIC) 20-25 MG per tablet, Take 1 tablet by mouth daily, Disp: 30 tablet, Rfl: 0     methotrexate 2.5 MG tablet, Take 9 tabs once weekly, Disp: 96 tablet, Rfl: 2     montelukast (SINGULAIR) 10 MG tablet, Take 10 mg by mouth At Bedtime, Disp: , Rfl:      OXYGEN-HELIUM IN, 2-3L PRN during the day, 3L @ night, Disp: , Rfl:      predniSONE (DELTASONE) 5 MG tablet, TAKE 3 TABLETS BY MOUTH DAILY FOR 10 DAYS, THEN 2 TABS DAILY FOR 10 DAYS, THEN  OFF., Disp: 50 tablet, Rfl: 1     rOPINIRole (REQUIP) 2 MG tablet, Take 2 mg by mouth nightly as needed , Disp: , Rfl:      vitamin C 500 MG TABS, Take 1 tablet (500 mg) by mouth 2 times daily, Disp: , Rfl:      zinc sulfate (ZINCATE) 220 (50 Zn) MG capsule, Take 1 capsule (220 mg) by mouth daily, Disp: , Rfl:   No current facility-administered medications for this visit.     Facility-Administered Medications Ordered in Other Visits:      Lidocaine 1 % injection 0.5-5 mL, 0.5-5 mL, Other, Once PRN, Gutierrez Candelario MD     sodium chloride (PF) 0.9% PF flush 5-50 mL, 5-50 mL, Intracatheter, Once PRN, Gutierrez Candelario MD    Allergies:   Bee Venom       Bees      Doxycycline       Erythromycin     Hydrocodone-Acetaminophen                 Past Medical History:  Remarkable for moderate, persistent asthma, hypertension, bipolar II disorder, interstitial lung disease, cervical stenosis with myelopathy 2017.       Chronic midline low back pain  Facial cellulitis  Morbid (severe) obesity due to excess calories  Dental abscess  Hypoxia  Subcutaneous emphysema  Borderline personality disorder  Stenosis of cervical spine with myelopathy  Esophageal stricture  Gastroesophageal reflux disease   Hypertension   Interstitial lung disease  Moderate persistent asthma without complication  Onychomycosis  Restless leg syndrome  Seasonal allergies  Smoker  Stress  Respiratory bronchiolitis interstitial lung disease     Past Surgical History:  Remarkable for surgical fusion  Hysterectomy 1997  rectocele and Cystocele surgery  Cholecystectomy     Family History:    The patient's family history includes Asthma in her mother; Back Pain in her father; Hypertension in her father; Other Cancer in her father.    Social History:  The patient reports that she has been smoking cigarettes, around 0.5 packs per day. She started smoking about 23 years ago. She has a 30.00 pack-year smoking history. She has never used smokeless tobacco. She reports that  "she does not drink alcohol or use drugs.   PCP: Adam Del Toro  Marital Status: Single     Physical Exam:   /86   Pulse 96   Temp 98.2  F (36.8  C) (Oral)   Ht 1.626 m (5' 4\")   Wt 116.8 kg (257 lb 9.6 oz)   LMP  (LMP Unknown)   SpO2 94%   BMI 44.22 kg/m      Wt Readings from Last 4 Encounters:   11/26/19 116.8 kg (257 lb 9.6 oz)   11/20/19 114 kg (251 lb 6.4 oz)   11/19/19 114.8 kg (253 lb)   11/04/19 114.8 kg (253 lb)     Constitutional: Well-developed, appearing stated age; cooperative.  Eyes: Normal EOM, PERRLA, vision, conjunctiva, sclera.  ENT: Normal external ears, nose, hearing, lips, teeth, gums, throat. No mucous membrane lesions. Moderately reduced anterior saliva flow.  Neck: No mass or thyroid enlargement.  Resp: Lungs clear to auscultation, nl to palpation.  CV: RRR, no murmurs, rubs or gallops, no edema.  GI: No ABD mass or tenderness, no HSM.  : Not tested.  Lymph: No cervical, supraclavicular, inguinal or epitrochlear nodes.  MS: The TMJ, neck, elbow, wrist, MCP/PIP/DIP, spine, hip, knee, ankle, and foot MTP/IP joints were examined and found normal. No active synovitis or altered joint anatomy. Normal  strength. No dactylitis, tenosynovitis, enthesopathy. Limited flexion beyond 90  in the left shoulder and pain with passive abduction. External rotation in the left shoulder is intact. Impingement of the left shoulder. Biceps and triceps strength is good. Biceps reflex is symmetrical.  Skin: No nail pitting, alopecia, rash, nodules or lesions.  Neuro: Normal cranial nerves, strength, sensation, DTRs.  Psych: Normal judgement, orientation, memory, affect.    Laboratory:   RHEUM RESULTS Latest Ref Rng & Units 9/9/2019 9/16/2019 9/23/2019   SED RATE 0 - 20 mm/h 25(H) 15 15   CRP, INFLAMMATION 0.0 - 8.0 mg/L 13.4(H) 10.2(H) 15.4(H)   AST 0 - 45 U/L 20 14 18   ALT 0 - 50 U/L 12 7 8   ALBUMIN 3.4 - 5.0 g/dL 3.5 3.5 3.5   WBC 4.0 - 11.0 10e9/L 7.4 6.5 6.4   RBC 3.8 - 5.2 10e12/L 3.99 " 4.04 4.04   HGB 11.7 - 15.7 g/dL 12.6 12.9 12.7   HCT 35.0 - 47.0 % 38.9 39.1 38.9   MCV 78 - 100 fl 98 97 96   MCHC 31.5 - 36.5 g/dL 32.4 33.0 32.6   RDW 10.0 - 15.0 % 13.2 13.3 13.6    - 450 10e9/L 315 221 220   CREATININE 0.52 - 1.04 mg/dL 0.71 0.59 0.57   GFR ESTIMATE, IF BLACK >60 mL/min/[1.73:m2] >90 >90 >90   GFR ESTIMATE >60 mL/min/[1.73:m2] >90 >90 >90     Scribe Disclosure:  IRik, am serving as a scribe to document services personally performed by Panchito Saenz MD at this visit, based upon the provider's statements to me. All documentation has been reviewed by the aforementioned provider prior to being entered into the official medical record.     Panchito Saenz MD

## 2019-11-26 NOTE — PROGRESS NOTES
"[facility]  Rheumatology Clinic  Panchito Saenz MD  2019     Name: Zayra Ramirez  MRN: [id number]  Age: 49 year old  : 1970  Referring provider: Aime Mason    Assessment and Plan:  # Seronegative inflammatory arthritis:  Patient relates resolution of prior ankle pain and right shoulder pain. Exam shows impingement, but intact passive range of motion at the left shoulder. Blood work from  revealed normal basic metabolic panel, CRP elevated at 15.4, and normal CBC. Sed rate was 15. MR of the lumbar spine showed no evidence of epidural abscess after evacuation of the T12-L2 laminectomy.    Inflammatory arthritis is overall improved on methotrexate monotherapy. I recommend continuing methotrexate 22.5 mg once weekly. While on the drug, patient should use folic acid 1 mg daily and undergo CBC, LFTs, and creatinine every 3-4 months.     # Impingement, left shoulder:  Patient is already performing daily physical therapy exercises. I urged her to continue and to add daily \"cane raising\" exercises to maintain flexion range of motion on the left. We may discuss subacromial injection if left shoulder pain persists. However, I recommend that patient address chronic right-sided low back pain first, and then address left shoulder discomfort treatment with possible injections or other invasive treatment later.     # Right-sided low back pain:  I agree with plans for repeat radiofrequency ablation to address pain arising from osteoarthrosis and neural impingement.     # Tobacco abuse:   I counseled the patient to quit smoking. Return to clinic in 6 months with Keyon Branham.    Follow-up: Return in about 6 months (around 2020) for follow up with Keyon Branham.    HPI:   Zayra Ramirez is a 49 year old female with a history including seronegative inflammatory arthritis and lower back pain who presents for follow-up. I last saw the patient on 2019, at which time seronegative inflammatory " arthritis was flaring with gradually increasing bilateral shoulder and ankle discomfort. We planned to start methotrexate 12.5 mg (half dose) now, and resume full dose 22.5 mg weekly at week 2, coincident with planned final dose of Ancef given for a complete six week course.    Today, the patient reports some discomfort in the left shoulder, but is otherwise well. She states she had a short course of prednisone for a couple weeks, but has been off of this now for a matter of weeks. She explains that her short course of prednisone was helpful. She noted the return of left shoulder pain after stopping prednisone as well. She reports that she has also had 4 doses of methotrexate (9 tablets) since our last visit. She also takes folic acid daily with the exception of the days she takes methotrexate.     She reports that she has morning joint stiffness for around one hour and is unsure if this is improved. She states that she takes naproxen for back pain. She denies significant ankle pain or other new symptoms or concerns. She states that she performs physical therapy exercises daily for her shoulder including stretching and wall-walking.     Patient was seen by Dr. Elizabeth in Infectious Disease on September 25th in follow-up of spinal epidural abscess.  Normalization of inflammatory markers and markedly improved symptoms were recorded. Completion of a 6-week course of cefazolin for a spinal abscess was recommended.     She reports that her right fingers/hand have seen to move or twitch as noticed by her family.     Patient saw Dr. Florian in [facility] in follow-up of lumbar spondylosis on November 4th. Chronic lower right back pain was reported. Improvement in pain after prior RF ablation at bilateral L2-L5 was noted. Plans to repeat the radiofrequency ablation procedure were arranged.     Review of Systems:   Pertinent items are noted in HPI or as below, remainder of complete ROS is negative.      No recent problems with  hearing or vision. No swallowing problems.   No breathing difficulty, shortness of breath, coughing, or wheezing.  No chest pain or palpitations.  No heart burn, indigestion, abdominal pain, nausea, vomiting, diarrhea.  No urination problems, no bloody, cloudy urine, no dysuria.  No numbing, tingling, weakness.  No headaches or confusion.  No rashes. No easy bleeding or bruising.     Active Medications:   Current Outpatient Medications:      albuterol (PROAIR HFA, PROVENTIL HFA, VENTOLIN HFA) 108 (90 BASE) MCG/ACT inhaler, Inhale 2 puffs into the lungs every 6 hours as needed for shortness of breath / dyspnea or wheezing, Disp: , Rfl:      ARIPiprazole (ABILIFY) 15 MG tablet, Take 15 mg by mouth daily , Disp: , Rfl:      cholecalciferol ( ULTRA STRENGTH) 2000 units CAPS, TAKE ONE CAPSULE BY MOUTH DAILY IN AM, Disp: , Rfl:      cyclobenzaprine (FLEXERIL) 10 MG tablet, Take 1 tablet (10 mg) by mouth 3 times daily, Disp: 30 tablet, Rfl: 0     DULoxetine (CYMBALTA) 60 MG capsule, Take 120 mg by mouth daily , Disp: , Rfl:      ferrous sulfate (FEROSUL) 325 (65 Fe) MG tablet, Take 325 mg by mouth daily, Disp: , Rfl: 0     fluticasone-salmeterol (ADVAIR) 500-50 MCG/DOSE diskus inhaler, Inhale 1 puff into the lungs every 12 hours, Disp: , Rfl:      folic acid (FOLVITE) 1 MG tablet, Take 1 tablet (1 mg) by mouth daily, Disp: 90 tablet, Rfl: 3     lisinopril-hydrochlorothiazide (PRINZIDE/ZESTORETIC) 20-25 MG per tablet, Take 1 tablet by mouth daily, Disp: 30 tablet, Rfl: 0     methotrexate 2.5 MG tablet, Take 9 tabs once weekly, Disp: 96 tablet, Rfl: 2     montelukast (SINGULAIR) 10 MG tablet, Take 10 mg by mouth At Bedtime, Disp: , Rfl:      OXYGEN-HELIUM IN, 2-3L PRN during the day, 3L @ night, Disp: , Rfl:      predniSONE (DELTASONE) 5 MG tablet, TAKE 3 TABLETS BY MOUTH DAILY FOR 10 DAYS, THEN 2 TABS DAILY FOR 10 DAYS, THEN OFF., Disp: 50 tablet, Rfl: 1     rOPINIRole (REQUIP) 2 MG tablet, Take 2 mg by mouth nightly  as needed , Disp: , Rfl:      vitamin C 500 MG TABS, Take 1 tablet (500 mg) by mouth 2 times daily, Disp: , Rfl:      zinc sulfate (ZINCATE) 220 (50 Zn) MG capsule, Take 1 capsule (220 mg) by mouth daily, Disp: , Rfl:   No current facility-administered medications for this visit.     Facility-Administered Medications Ordered in Other Visits:      Lidocaine 1 % injection 0.5-5 mL, 0.5-5 mL, Other, Once PRN, Gutierrez Candelario MD     sodium chloride (PF) 0.9% PF flush 5-50 mL, 5-50 mL, Intracatheter, Once PRN, Gutierrez Candelario MD    Allergies:   Bee Venom       Bees      Doxycycline       Erythromycin     Hydrocodone-Acetaminophen                 Past Medical History:  Remarkable for moderate, persistent asthma, hypertension, bipolar II disorder, interstitial lung disease, cervical stenosis with myelopathy 2017.       Chronic midline low back pain  Facial cellulitis  Morbid (severe) obesity due to excess calories  Dental abscess  Hypoxia  Subcutaneous emphysema  Borderline personality disorder  Stenosis of cervical spine with myelopathy  Esophageal stricture  Gastroesophageal reflux disease   Hypertension   Interstitial lung disease  Moderate persistent asthma without complication  Onychomycosis  Restless leg syndrome  Seasonal allergies  Smoker  Stress  Respiratory bronchiolitis interstitial lung disease     Past Surgical History:  Remarkable for surgical fusion  Hysterectomy 1997  rectocele and Cystocele surgery  Cholecystectomy     Family History:    The patient's family history includes Asthma in her mother; Back Pain in her father; Hypertension in her father; Other Cancer in her father.    Social History:  The patient reports that she has been smoking cigarettes, around 0.5 packs per day. She started smoking about 23 years ago. She has a 30.00 pack-year smoking history. She has never used smokeless tobacco. She reports that she does not drink alcohol or use drugs.   PCP: Adam Del Toro  Marital Status: Single  "    Physical Exam:   /86   Pulse 96   Temp 98.2  F (36.8  C) (Oral)   Ht 1.626 m (5' 4\")   Wt 116.8 kg (257 lb 9.6 oz)   LMP  (LMP Unknown)   SpO2 94%   BMI 44.22 kg/m     Wt Readings from Last 4 Encounters:   11/26/19 116.8 kg (257 lb 9.6 oz)   11/20/19 114 kg (251 lb 6.4 oz)   11/19/19 114.8 kg (253 lb)   11/04/19 114.8 kg (253 lb)     Constitutional: Well-developed, appearing stated age; cooperative.  Eyes: Normal EOM, PERRLA, vision, conjunctiva, sclera.  ENT: Normal external ears, nose, hearing, lips, teeth, gums, throat. No mucous membrane lesions. Moderately reduced anterior saliva flow.  Neck: No mass or thyroid enlargement.  Resp: Lungs clear to auscultation, nl to palpation.  CV: RRR, no murmurs, rubs or gallops, no edema.  GI: No ABD mass or tenderness, no HSM.  : Not tested.  Lymph: No cervical, supraclavicular, inguinal or epitrochlear nodes.  MS: The TMJ, neck, elbow, wrist, MCP/PIP/DIP, spine, hip, knee, ankle, and foot MTP/IP joints were examined and found normal. No active synovitis or altered joint anatomy. Normal  strength. No dactylitis, tenosynovitis, enthesopathy. Limited flexion beyond 90  in the left shoulder and pain with passive abduction. External rotation in the left shoulder is intact. Impingement of the left shoulder. Biceps and triceps strength is good. Biceps reflex is symmetrical.  Skin: No nail pitting, alopecia, rash, nodules or lesions.  Neuro: Normal cranial nerves, strength, sensation, DTRs.  Psych: Normal judgement, orientation, memory, affect.    Laboratory:   RHEUM RESULTS Latest Ref Rng & Units 9/9/2019 9/16/2019 9/23/2019   SED RATE 0 - 20 mm/h 25(H) 15 15   CRP, INFLAMMATION 0.0 - 8.0 mg/L 13.4(H) 10.2(H) 15.4(H)   AST 0 - 45 U/L 20 14 18   ALT 0 - 50 U/L 12 7 8   ALBUMIN 3.4 - 5.0 g/dL 3.5 3.5 3.5   WBC 4.0 - 11.0 10e9/L 7.4 6.5 6.4   RBC 3.8 - 5.2 10e12/L 3.99 4.04 4.04   HGB 11.7 - 15.7 g/dL 12.6 12.9 12.7   HCT 35.0 - 47.0 % 38.9 39.1 38.9   MCV 78 - " 100 fl 98 97 96   MCHC 31.5 - 36.5 g/dL 32.4 33.0 32.6   RDW 10.0 - 15.0 % 13.2 13.3 13.6    - 450 10e9/L 315 221 220   CREATININE 0.52 - 1.04 mg/dL 0.71 0.59 0.57   GFR ESTIMATE, IF BLACK >60 mL/min/[1.73:m2] >90 >90 >90   GFR ESTIMATE >60 mL/min/[1.73:m2] >90 >90 >90     Scribe Disclosure:  Rik RICO, am serving as a scribe to document services personally performed by Panchtio Saenz MD at this visit, based upon the provider's statements to me. All documentation has been reviewed by the aforementioned provider prior to being entered into the official medical record.

## 2019-11-26 NOTE — PATIENT INSTRUCTIONS
Diagnosis:  1. Seronegative inflammatory arthritis, improved  2. Left rotator cuff tendonitis.   3. R hand tremor.  Plan:  1. Continue daily left shoulder exercises as prescribed by physical therapy including cane raises 10 times daily.  2. Continue methotrexate at 9 tablets weekly with folic acid (at least 1 mg) supplementation daily.  3. Stop smoking.  4. Bloodwork with next draw for liver function and blood counts; check blood again in March.

## 2019-11-27 PROBLEM — M19.90 INFLAMMATORY ARTHRITIS: Status: ACTIVE | Noted: 2019-11-27

## 2019-12-05 ENCOUNTER — OFFICE VISIT (OUTPATIENT)
Dept: INTERNAL MEDICINE | Facility: CLINIC | Age: 49
End: 2019-12-05
Payer: COMMERCIAL

## 2019-12-05 VITALS
HEART RATE: 68 BPM | DIASTOLIC BLOOD PRESSURE: 82 MMHG | WEIGHT: 250 LBS | BODY MASS INDEX: 42.91 KG/M2 | SYSTOLIC BLOOD PRESSURE: 134 MMHG | OXYGEN SATURATION: 95 %

## 2019-12-05 DIAGNOSIS — M54.6 CHRONIC BILATERAL THORACIC BACK PAIN: ICD-10-CM

## 2019-12-05 DIAGNOSIS — G89.29 CHRONIC BILATERAL THORACIC BACK PAIN: ICD-10-CM

## 2019-12-05 DIAGNOSIS — R25.1 TREMULOUSNESS: Primary | ICD-10-CM

## 2019-12-05 DIAGNOSIS — Z23 ENCOUNTER FOR VACCINATION: ICD-10-CM

## 2019-12-05 ASSESSMENT — ENCOUNTER SYMPTOMS
DECREASED APPETITE: 0
POLYDIPSIA: 0
NAUSEA: 0
HALLUCINATIONS: 0
BLOOD IN STOOL: 0
CHILLS: 0
PARALYSIS: 0
MUSCLE WEAKNESS: 1
MUSCLE CRAMPS: 1
DIZZINESS: 0
WEAKNESS: 1
LOSS OF CONSCIOUSNESS: 0
NECK PAIN: 1
DISTURBANCES IN COORDINATION: 0
TINGLING: 1
BACK PAIN: 1
FATIGUE: 1
SEIZURES: 0
TREMORS: 1
NUMBNESS: 1
CONSTIPATION: 0
INCREASED ENERGY: 1
NIGHT SWEATS: 0
BOWEL INCONTINENCE: 0
MYALGIAS: 1
FEVER: 0
SKIN CHANGES: 0
HEADACHES: 0
DIARRHEA: 1
WEIGHT LOSS: 0
MEMORY LOSS: 0
JOINT SWELLING: 1
WEIGHT GAIN: 1
BLOATING: 1
HEARTBURN: 1
VOMITING: 0
JAUNDICE: 0
SPEECH CHANGE: 0
STIFFNESS: 1
ARTHRALGIAS: 1
ABDOMINAL PAIN: 1
POOR WOUND HEALING: 0
NAIL CHANGES: 0
RECTAL PAIN: 0
POLYPHAGIA: 0
ALTERED TEMPERATURE REGULATION: 0

## 2019-12-05 ASSESSMENT — ANXIETY QUESTIONNAIRES
3. WORRYING TOO MUCH ABOUT DIFFERENT THINGS: MORE THAN HALF THE DAYS
1. FEELING NERVOUS, ANXIOUS, OR ON EDGE: MORE THAN HALF THE DAYS
GAD7 TOTAL SCORE: 9
IF YOU CHECKED OFF ANY PROBLEMS ON THIS QUESTIONNAIRE, HOW DIFFICULT HAVE THESE PROBLEMS MADE IT FOR YOU TO DO YOUR WORK, TAKE CARE OF THINGS AT HOME, OR GET ALONG WITH OTHER PEOPLE: SOMEWHAT DIFFICULT
6. BECOMING EASILY ANNOYED OR IRRITABLE: NOT AT ALL
5. BEING SO RESTLESS THAT IT IS HARD TO SIT STILL: MORE THAN HALF THE DAYS
7. FEELING AFRAID AS IF SOMETHING AWFUL MIGHT HAPPEN: SEVERAL DAYS
2. NOT BEING ABLE TO STOP OR CONTROL WORRYING: SEVERAL DAYS

## 2019-12-05 ASSESSMENT — PATIENT HEALTH QUESTIONNAIRE - PHQ9
SUM OF ALL RESPONSES TO PHQ QUESTIONS 1-9: 7
5. POOR APPETITE OR OVEREATING: SEVERAL DAYS

## 2019-12-05 ASSESSMENT — PAIN SCALES - GENERAL: PAINLEVEL: MODERATE PAIN (4)

## 2019-12-05 NOTE — PROGRESS NOTES
"                     [facility]       SUBJECTIVE:  Zayra Ramirez is a 49 year old female with a PMHx of depression, GERD, HTN, ILD, RA, and cervical spinal stenosis who comes in to discuss RUE tremulousness and back problems. She is accompanied by friend, Leida.    Patient reports that ~1.5 months ago, her daughter noted that the patient's R hand had a mild tremor when she was eating. Since that time patient reports having a fine tremor, most notable when eating, occurring 3-4 times daily. She can focus on her hand for a period of time and the tremors will stop. She denies numbness and tingling in the joint. Tremulous has not been so severe that she has been dropping items. No weakness. Patient does not drink alcohol; thus unsure if this would improve symptoms. No family history of tremor.     Patient continues to work with pain management for her chronic back pain; however, she feels that she has \"tiredness and weakness\" in her midback. She is wondering if PT would be helpful. Patient did participate in pool therapy in the past but was not able to continue exercises once the formal program had ended.     Medications and allergies reviewed by me today.       ROS:   Answers for HPI/ROS submitted by the patient on 12/5/2019   General Symptoms: Yes  Skin Symptoms: Yes  HENT Symptoms: No  EYE SYMPTOMS: No  HEART SYMPTOMS: No  LUNG SYMPTOMS: No  INTESTINAL SYMPTOMS: Yes  URINARY SYMPTOMS: No  GYNECOLOGIC SYMPTOMS: No  BREAST SYMPTOMS: No  SKELETAL SYMPTOMS: Yes  BLOOD SYMPTOMS: No  NERVOUS SYSTEM SYMPTOMS: Yes  MENTAL HEALTH SYMPTOMS: No  Fever: No  Loss of appetite: No  Weight loss: No  Weight gain: Yes  Fatigue: Yes  Night sweats: No  Chills: No  Increased stress: Yes  Excessive hunger: No  Excessive thirst: No  Feeling hot or cold when others believe the temperature is normal: No  Loss of height: No  Post-operative complications: No  Surgical site pain: No  Hallucinations: No  Change in or Loss of Energy: " Yes  Hyperactivity: No  Confusion: No  Changes in hair: No  Changes in moles/birth marks: No  Itching: No  Rashes: Yes  Changes in nails: No  Acne: No  Hair in places you don't want it: No  Change in facial hair: Yes  Warts: No  Non-healing sores: No  Scarring: No  Flaking of skin: No  Color changes of hands/feet in cold : No  Sun sensitivity: No  Skin thickening: No  Heart burn or indigestion: Yes  Nausea: No  Vomiting: No  Abdominal pain: Yes  Bloating: Yes  Constipation: No  Diarrhea: Yes  Blood in stool: No  Black stools: No  Rectal or Anal pain: No  Fecal incontinence: No  Yellowing of skin or eyes: No  Vomit with blood: No  Change in stools: No  Back pain: Yes  Muscle aches: Yes  Neck pain: Yes  Swollen joints: Yes  Joint pain: Yes  Bone pain: No  Muscle cramps: Yes  Muscle weakness: Yes  Joint stiffness: Yes  Bone fracture: No  Trouble with coordination: No  Dizziness or trouble with balance: No  Fainting or black-out spells: No  Memory loss: No  Headache: No  Seizures: No  Speech problems: No  Tingling: Yes  Tremor: Yes  Weakness: Yes  Difficulty walking: No  Paralysis: No  Numbness: Yes      CURRENT MEDICATIONS:  Medication List:   Current Outpatient Medications   Medication Sig     albuterol (PROAIR HFA, PROVENTIL HFA, VENTOLIN HFA) 108 (90 BASE) MCG/ACT inhaler Inhale 2 puffs into the lungs every 6 hours as needed for shortness of breath / dyspnea or wheezing     ARIPiprazole (ABILIFY) 15 MG tablet Take 15 mg by mouth daily      cholecalciferol ( ULTRA STRENGTH) 2000 units CAPS TAKE ONE CAPSULE BY MOUTH DAILY IN AM     cyclobenzaprine (FLEXERIL) 10 MG tablet Take 1 tablet (10 mg) by mouth 3 times daily     DULoxetine (CYMBALTA) 60 MG capsule Take 120 mg by mouth daily      ferrous sulfate (FEROSUL) 325 (65 Fe) MG tablet Take 325 mg by mouth daily     fluticasone-salmeterol (ADVAIR) 500-50 MCG/DOSE diskus inhaler Inhale 1 puff into the lungs every 12 hours     folic acid (FOLVITE) 1 MG tablet Take 1  tablet (1 mg) by mouth daily     lisinopril-hydrochlorothiazide (PRINZIDE/ZESTORETIC) 20-25 MG per tablet Take 1 tablet by mouth daily     methotrexate 2.5 MG tablet Take 9 tabs once weekly     montelukast (SINGULAIR) 10 MG tablet Take 10 mg by mouth At Bedtime     OXYGEN-HELIUM IN 2-3L PRN during the day, 3L @ night     predniSONE (DELTASONE) 5 MG tablet TAKE 3 TABLETS BY MOUTH DAILY FOR 10 DAYS, THEN 2 TABS DAILY FOR 10 DAYS, THEN OFF.     rOPINIRole (REQUIP) 2 MG tablet Take 2 mg by mouth nightly as needed      vitamin C 500 MG TABS Take 1 tablet (500 mg) by mouth 2 times daily     zinc sulfate (ZINCATE) 220 (50 Zn) MG capsule Take 1 capsule (220 mg) by mouth daily     No current facility-administered medications for this visit.      Facility-Administered Medications Ordered in Other Visits   Medication     Lidocaine 1 % injection 0.5-5 mL     sodium chloride (PF) 0.9% PF flush 5-50 mL       OBJECTIVE:    /82   Pulse 68   Wt 113.4 kg (250 lb)   LMP  (LMP Unknown)   SpO2 95%   BMI 42.91 kg/m     Wt Readings from Last 1 Encounters:   12/05/19 113.4 kg (250 lb)     GENERAL: well-appearing, alert and no distress  EYES: EOMI,  sclera-nonicteric  NECK: no cervical lymphadenopathy  RESP: normal respiratory effort on room air, CTAB - no rales, rhonchi or wheezes  CV: regular rate and rhythm, normal S1 S2, no S3 or S4, no murmur, click or rub   ABDOMEN: normoactive bowel sounds, soft, nontender, nondistended, no HSM or masses   MS/Ext: WWP, bilat extremities nontender to palpation w/o gross deformity, no evidence of inflammation in joints   SKIN: no suspicious lesions or rashes on exposed skin  NEURO: AOx3, spontaneous movement of all extremities; UE negative for resting and intention tremor, rapid alternating movements normal in bilat UE and LE, UE  strength normal   PSYCH: interactive, affect normal/bright, speech normal      ASSESSMENT/PLAN:    Zayra was seen today for physical.    Diagnoses and all  orders for this visit:    Tremulousness  Differential includes essential tremor (less likely due to not being persistent), physiologic tremor (patient cannot identify specific trigger), degenerative neurological condition (no weakness associated with tremor) - currently suspect that tremulousness may be related to medication side effect of albuterol, as patient notes using medication multiple times on a daily basis. In setting of both asthma and ILD, patient continues to need medication but she should attempt to space out doses at regular intervals if possible.     Chronic bilateral thoracic back pain  Patient would likely benefit from PT for back muscle strengthening. Patient agreed that trialing PT that can ultimately be completed at home would likely be best for her.   -     PHYSICAL THERAPY REFERRAL; Future    Encounter for vaccination  Due to patient's ILD, she should receive vaccination for pneumonia ppx. Has already received Pneumovax. Will receive Prevnar today.  -     Pneumococcal vaccine 13 valent PCV13 IM (Prevnar) [[id number]]         Patient should return to clinic for f/u with me in 2 months.    Questions and concerns were addressed. Zayra Ramirez participated in decision making and agrees with the above plan.    Sina Del Toro MD, PhD  Internal Medicine, PGY-2  Pager: [phone]    Dec 5, 2019    Patient was seen/plan of care discussed with Dr. Tolliver.  While the patient was in clinic, I reviewed the pertinent medical history and results.  I discussed the current findings on physical examination, as well as the patient s diagnosis and treatment plan with the resident and agree with the information as documented with the following exceptions: none.  Louise Tolliver MD

## 2019-12-05 NOTE — NURSING NOTE
Chief Complaint   Patient presents with     Physical     Kimberly Nissen, EMT at 12:33 PM on 12/5/2019

## 2019-12-06 ASSESSMENT — ASTHMA QUESTIONNAIRES: ACT_TOTALSCORE: 14

## 2019-12-06 ASSESSMENT — ANXIETY QUESTIONNAIRES: GAD7 TOTAL SCORE: 9

## 2019-12-09 ENCOUNTER — THERAPY VISIT (OUTPATIENT)
Dept: PHYSICAL THERAPY | Facility: CLINIC | Age: 49
End: 2019-12-09
Attending: INTERNAL MEDICINE
Payer: COMMERCIAL

## 2019-12-09 DIAGNOSIS — M54.6 CHRONIC BILATERAL THORACIC BACK PAIN: ICD-10-CM

## 2019-12-09 DIAGNOSIS — G89.29 CHRONIC BILATERAL THORACIC BACK PAIN: ICD-10-CM

## 2019-12-09 PROCEDURE — 97110 THERAPEUTIC EXERCISES: CPT | Mod: GP | Performed by: PHYSICAL THERAPIST

## 2019-12-09 PROCEDURE — 97112 NEUROMUSCULAR REEDUCATION: CPT | Mod: GP | Performed by: PHYSICAL THERAPIST

## 2019-12-09 PROCEDURE — 97162 PT EVAL MOD COMPLEX 30 MIN: CPT | Mod: GP | Performed by: PHYSICAL THERAPIST

## 2019-12-09 NOTE — PROGRESS NOTES
[facility] Initial Evaluation  Subjective:  Onset of mid-thoracic back pain in August 2019. Did have a stimulator implanted in thoracic spine as a trial in Sept but did not help so had removed.  Mid-back feels tired/fatigued quickly. WORSE standing more than 3-5 minutes, walking more than 5 minutes. BETTER with sitting/get off feet. Also long history of LBP, no surgery. Goal is to be able to stand longer, tolerate being on feet.     The history is provided by the patient. No  was used.   Zayra Ramirez being seen for mid back fatigue.     and reported as 5/10 on pain scale. General health as reported by patient is good. Pertinent medical history includes:  Asthma, high blood pressure, rheumatoid arthritis and overweight.    Surgeries include:  Orthopedic surgery (trial of spine stimulator Sept 2019, removed).  Current medications:  Anti-depressants, high blood pressure medication, pain medication and sleep medication.     Pain is described as aching and is constant. Pain is worse in the P.M.. Since onset symptoms are gradually worsening. Special tests:  X-ray.  There was none improvement following previous treatment.                              Objective:  Standing Alignment:    Cervical/Thoracic:  Thoracic kyphosis increased and forward head  Shoulder/UE:  Rounded shoulders  Lumbar:  Lordosis decr                           Lumbar/SI Evaluation  ROM:    AROM Lumbar:   Flexion:          WNL  Ext:                    Mod loss, NE on thoracic pain   Side Bend:        Left:  WNL    Right:  Min loss, NE on thoracic pain  Rotation:           Left:     Right:   Side Glide:        Left:     Right:       AROM Thoracic:  Flex:               WNL  Ext:                Max loss, central T10-L1 pain  Rotation:        Left:  Mod loss, pain central to R ~T10-L1    Right:  Mid loss, pain central to R ~T10-L1     Strength: Poor scap control/strength with prone arm lift B, fatigues after 3-5  seconds.   Lumbar Myotomes:  not assessed            Lumbar DTR's:  not assessed        Lumbar Dermtomes:  not assessed                Neural Tension/Mobility:  Lumbar:  Normal  Thoracic:  Normal      Lumbar Palpation:    Tenderness present at Left:    Erector Spinae  Tenderness present at Right: Erector Spinae      Spinal Segmental Conclusions: Also T8-10 hypomobile PA direction    Level: Hypo noted at T10, T11, T12 and L1                                                   General     ROS    Assessment/Plan:    Patient is a 49 year old female with thoracic complaints.    Patient has the following significant findings with corresponding treatment plan.                Diagnosis 1:  Thoracolumbar pain  Pain -  hot/cold therapy, manual therapy, splint/taping/bracing/orthotics, self management, education, directional preference exercise and home program  Decreased ROM/flexibility - manual therapy and therapeutic exercise  Decreased joint mobility - manual therapy and therapeutic exercise  Decreased strength - therapeutic exercise and therapeutic activities  Decreased proprioception - neuro re-education and therapeutic activities  Inflammation - self management/home program  Impaired gait - gait training  Impaired muscle performance - neuro re-education  Decreased function - therapeutic activities  Impaired posture - neuro re-education    Therapy Evaluation Codes:   1) History comprised of:   Personal factors that impact the plan of care:      None.    Comorbidity factors that impact the plan of care are:      Overweight and Rheumatoid arthritis.     Medications impacting care: Pain.  2) Examination of Body Systems comprised of:   Body structures and functions that impact the plan of care:      Lumbar spine and Thoracic Spine.   Activity limitations that impact the plan of care are:      Bathing, Cooking, Dressing and Standing.  3) Clinical presentation characteristics  are:   Evolving/Changing.  4) Decision-Making    Moderate complexity using standardized patient assessment instrument and/or measureable assessment of functional outcome.  Cumulative Therapy Evaluation is: Moderate complexity.    Previous and current functional limitations:  (See Goal Flow Sheet for this information)    Short term and Long term goals: (See Goal Flow Sheet for this information)     Communication ability:  Patient appears to be able to clearly communicate and understand verbal and written communication and follow directions correctly.  Treatment Explanation - The following has been discussed with the patient:   RX ordered/plan of care  Anticipated outcomes  Possible risks and side effects  This patient would benefit from PT intervention to resume normal activities.   Rehab potential is excellent.    Frequency:  1 X week, once daily  Duration:  for 6 weeks  Discharge Plan:  Achieve all LTG.  Independent in home treatment program.  Reach maximal therapeutic benefit.    Please refer to the daily flowsheet for treatment today, total treatment time and time spent performing 1:1 timed codes.

## 2019-12-10 PROBLEM — M54.6 CHRONIC BILATERAL THORACIC BACK PAIN: Status: ACTIVE | Noted: 2019-12-10

## 2019-12-10 PROBLEM — G89.29 CHRONIC BILATERAL THORACIC BACK PAIN: Status: ACTIVE | Noted: 2019-12-10

## 2019-12-15 ENCOUNTER — HEALTH MAINTENANCE LETTER (OUTPATIENT)
Age: 49
End: 2019-12-15

## 2019-12-16 ENCOUNTER — THERAPY VISIT (OUTPATIENT)
Dept: PHYSICAL THERAPY | Facility: CLINIC | Age: 49
End: 2019-12-16
Attending: INTERNAL MEDICINE
Payer: COMMERCIAL

## 2019-12-16 DIAGNOSIS — G89.29 CHRONIC BILATERAL THORACIC BACK PAIN: ICD-10-CM

## 2019-12-16 DIAGNOSIS — M54.6 CHRONIC BILATERAL THORACIC BACK PAIN: ICD-10-CM

## 2019-12-16 PROCEDURE — 97110 THERAPEUTIC EXERCISES: CPT | Mod: GP | Performed by: PHYSICAL THERAPIST

## 2019-12-16 PROCEDURE — 97112 NEUROMUSCULAR REEDUCATION: CPT | Mod: GP | Performed by: PHYSICAL THERAPIST

## 2019-12-17 ENCOUNTER — HOSPITAL ENCOUNTER (OUTPATIENT)
Facility: AMBULATORY SURGERY CENTER | Age: 49
End: 2019-12-17
Attending: ANESTHESIOLOGY
Payer: COMMERCIAL

## 2019-12-17 ENCOUNTER — ANCILLARY PROCEDURE (OUTPATIENT)
Dept: RADIOLOGY | Facility: AMBULATORY SURGERY CENTER | Age: 49
End: 2019-12-17
Attending: ANESTHESIOLOGY
Payer: COMMERCIAL

## 2019-12-17 VITALS
RESPIRATION RATE: 16 BRPM | DIASTOLIC BLOOD PRESSURE: 86 MMHG | SYSTOLIC BLOOD PRESSURE: 149 MMHG | OXYGEN SATURATION: 95 % | TEMPERATURE: 98 F | HEART RATE: 85 BPM

## 2019-12-17 DIAGNOSIS — R52 PAIN: ICD-10-CM

## 2019-12-17 DIAGNOSIS — M47.816 LUMBAR SPONDYLOSIS: ICD-10-CM

## 2019-12-17 RX ORDER — MIDAZOLAM HYDROCHLORIDE 1 MG/ML
INJECTION INTRAMUSCULAR; INTRAVENOUS PRN
Status: DISCONTINUED | OUTPATIENT
Start: 2019-12-17 | End: 2019-12-17 | Stop reason: HOSPADM

## 2019-12-17 RX ORDER — LIDOCAINE HYDROCHLORIDE 10 MG/ML
INJECTION, SOLUTION EPIDURAL; INFILTRATION; INTRACAUDAL; PERINEURAL PRN
Status: DISCONTINUED | OUTPATIENT
Start: 2019-12-17 | End: 2019-12-17 | Stop reason: HOSPADM

## 2019-12-17 RX ORDER — BUPIVACAINE HYDROCHLORIDE 2.5 MG/ML
INJECTION, SOLUTION INFILTRATION; PERINEURAL PRN
Status: DISCONTINUED | OUTPATIENT
Start: 2019-12-17 | End: 2019-12-17 | Stop reason: HOSPADM

## 2019-12-17 RX ORDER — FENTANYL CITRATE 50 UG/ML
INJECTION, SOLUTION INTRAMUSCULAR; INTRAVENOUS PRN
Status: DISCONTINUED | OUTPATIENT
Start: 2019-12-17 | End: 2019-12-17 | Stop reason: HOSPADM

## 2019-12-17 RX ORDER — LIDOCAINE 40 MG/G
CREAM TOPICAL
Status: DISCONTINUED | OUTPATIENT
Start: 2019-12-17 | End: 2019-12-18 | Stop reason: HOSPADM

## 2019-12-17 RX ORDER — LIDOCAINE HYDROCHLORIDE 20 MG/ML
INJECTION, SOLUTION INFILTRATION; PERINEURAL PRN
Status: DISCONTINUED | OUTPATIENT
Start: 2019-12-17 | End: 2019-12-17 | Stop reason: HOSPADM

## 2019-12-17 NOTE — H&P
Zayra PADRON James  [id number]  female  49 year old      Reason for procedure/surgery: L RFA    Patient Active Problem List   Diagnosis     Chronic midline low back pain     Facial cellulitis     Morbid (severe) obesity due to excess calories (H)     Dental abscess     Hypoxia     Subcutaneous emphysema (H)     Borderline personality disorder (H)     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)     Esophageal stricture     GERD (gastroesophageal reflux disease)     HTN (hypertension)     Interstitial lung disease (H)     Moderate persistent asthma without complication     Onychomycosis     Restless leg syndrome     Seasonal allergies     Smoker     Stress     Respiratory bronchiolitis interstitial lung disease (H)     Spinal epidural abscess     Lumbar spondylosis     Inflammatory arthritis     Chronic bilateral thoracic back pain       Past Surgical History:    Past Surgical History:   Procedure Laterality Date     CHOLECYSTECTOMY       COLONOSCOPY       ENT SURGERY       EXCISE LESION INTRAORAL Bilateral 10/3/2018    Procedure: EXCISE LESION INTRAORAL;  Wide Local Excision Of of Left Oral Cavity Ulcer;  Surgeon: Morro Mijares MD;  Location: [facility] OR     HC DRAIN SKIN ABSCESS SIMPLE/SINGLE  3/16/2012    Procedure:INCISION AND DRAINAGE, ABSCESS, SIMPLE; Surgeon:CHRISTIANO HANCOCK; Location: GI     HEAD & NECK SURGERY       HYSTERECTOMY       INCISION AND DRAINAGE ABDOMEN WASHOUT, COMBINED       LAMINECTOMY THORACIC ONE LEVEL N/A 8/19/2019    Procedure: LAMINECTOMY, SPINE, THORACIC, 11-12 and Part of Lumbar 1, DRAINAGE OF EPIDURAL ABCESS, Epidural Drain Placement X 2;  Surgeon: Yadiel Beal MD;  Location: [facility] OR     spinal cord stimulator  08/08/2019     spinal cord stimulator removal  08/13/2019       Past Medical History:   Past Medical History:   Diagnosis Date     Allergic rhinitis      Anemia      Arthritis      Asthma     copd     Dental abscess 8-2015     Depressive disorder       Gastroesophageal reflux disease      History of emphysema      Hoarseness      Hypertension      Morbid obesity with BMI of 40.0-44.9, adult (H)      Obstructive sleep apnea      Respiratory bronchiolitis interstitial lung disease (H)      Sleep apnea        Social History:   Social History     Tobacco Use     Smoking status: Current Every Day Smoker     Packs/day: 1.00     Years: 30.00     Pack years: 30.00     Types: Cigarettes     Start date: 1/1/1996     Smokeless tobacco: Never Used   Substance Use Topics     Alcohol use: No     Binge frequency: Monthly       Family History:   Family History   Problem Relation Age of Onset     Other Cancer Father         base of tongue cancer at age ~65     Hypertension Father      Back Pain Father      Asthma Mother        Allergies:   Allergies   Allergen Reactions     Bee Venom Anaphylaxis     Bees      Doxycycline Anaphylaxis     Patient thinks it may have been just nausea and vomiting, however unable to confirm     Erythromycin Anaphylaxis and Shortness Of Breath     Other reaction(s): Vomiting     Hydrocodone-Acetaminophen Itching       Active Medications:   Current Outpatient Medications   Medication Sig Dispense Refill     albuterol (PROAIR HFA, PROVENTIL HFA, VENTOLIN HFA) 108 (90 BASE) MCG/ACT inhaler Inhale 2 puffs into the lungs every 6 hours as needed for shortness of breath / dyspnea or wheezing       ARIPiprazole (ABILIFY) 15 MG tablet Take 15 mg by mouth daily        cholecalciferol ( ULTRA STRENGTH) 2000 units CAPS TAKE ONE CAPSULE BY MOUTH DAILY IN AM       cyclobenzaprine (FLEXERIL) 10 MG tablet Take 1 tablet (10 mg) by mouth 3 times daily 30 tablet 0     DULoxetine (CYMBALTA) 60 MG capsule Take 120 mg by mouth daily        ferrous sulfate (FEROSUL) 325 (65 Fe) MG tablet Take 325 mg by mouth daily  0     fluticasone-salmeterol (ADVAIR) 500-50 MCG/DOSE diskus inhaler Inhale 1 puff into the lungs every 12 hours       folic acid (FOLVITE) 1 MG tablet Take 1  tablet (1 mg) by mouth daily 90 tablet 3     lisinopril-hydrochlorothiazide (PRINZIDE/ZESTORETIC) 20-25 MG per tablet Take 1 tablet by mouth daily 30 tablet 0     methotrexate 2.5 MG tablet Take 9 tabs once weekly 96 tablet 2     montelukast (SINGULAIR) 10 MG tablet Take 10 mg by mouth At Bedtime       OXYGEN-HELIUM IN 2-3L PRN during the day, 3L @ night       rOPINIRole (REQUIP) 2 MG tablet Take 2 mg by mouth nightly as needed        vitamin C 500 MG TABS Take 1 tablet (500 mg) by mouth 2 times daily       zinc sulfate (ZINCATE) 220 (50 Zn) MG capsule Take 1 capsule (220 mg) by mouth daily       predniSONE (DELTASONE) 5 MG tablet TAKE 3 TABLETS BY MOUTH DAILY FOR 10 DAYS, THEN 2 TABS DAILY FOR 10 DAYS, THEN OFF. 50 tablet 1       Systemic Review:   CONSTITUTIONAL: NEGATIVE for fever, chills, change in weight  ENT/MOUTH: NEGATIVE for ear, mouth and throat problems  RESP: NEGATIVE for significant cough or SOB  CV: NEGATIVE for chest pain, palpitations or peripheral edema    Physical Examination:   Vital Signs: BP (!) 149/86   Pulse 85   Temp 98  F (36.7  C) (Temporal)   Resp 16   LMP  (LMP Unknown)   SpO2 95%   GENERAL: healthy, alert and no distress  NECK: no adenopathy, no asymmetry, masses, or scars  RESP: lungs clear to auscultation - no rales, rhonchi or wheezes  CV: regular rate and rhythm, normal S1 S2, no S3 or S4, no murmur, click or rub, no peripheral edema and peripheral pulses strong  ABDOMEN: soft, nontender, no hepatosplenomegaly, no masses and bowel sounds normal  MS: no gross musculoskeletal defects noted, no edema    Plan: Appropriate to proceed as scheduled.      Tram Florian MD  12/17/2019

## 2019-12-17 NOTE — PROCEDURES
Lumbar Medial Branch Radiofrequency    PROCEDURE NOTE:    Chief Complaint:  Back pain    Pre-Operative Diagnosis:  1. Spondylosis w/o myelopathy or radiculopathy, lumbar region  2. Spondylosis w/o myelopathy or radiculopathy, lumbosacral region  3. Other intervertebral disc degeneration, lumbar region  4. Low back pain    Post-Operative Diagnosis:  1. Spondylosis w/o myelopathy or radiculopathy, lumbar region  2. Spondylosis w/o myelopathy or radiculopathy, lumbosacral region  3. Other intervertebral disc degeneration, lumbar region  4. Low back pain    Procedure:  Right lumbar medial branch nerve radiofrequency ablation bilateral L2,3,4,5 nerves supplying the right L3-4, L4-5 and L5-S1 facet joints  Fluoroscopy for needle guidance  Intravenous conscious sedation     Anesthesia:  The patient was anxious prior to the procedure and requested conscious sedation. ?She was monitored with pulse oximetry, automatic blood pressure cuff and ECG. She received moderate sedation and was attended by a registered nurse for one-to-one monitoring throughout the procedure. The following medication(s) were administered for anxiety and analgesia: 2 mg midazolam and 100 mcg fentanyl over 27 minutes. Medication dosages and vital signs are recorded in the estela-operative note.    Indications / Pre-Operative Plan:  The patient has persistent axial, non radicular pain with paraspinal tenderness, and has failed a variety of conservative therapeutic interventions including activity modification, physical therapy, and medications. The patient has had diagnostic response with greater than 80% relief and improvement in function for the duration of the two comparative anesthetic medial branch blocks and presents for therapeutic Radiofrequency medial branch neurolysis. The risks, alternatives and benefits were explained to the patient in detail. The patient agreed with the plan.   Patient was examined by me prior to the procedure. Examination of  heart, lung and mental status were all within acceptable limits. The patient has been assessed, examined and cleared for the planned procedure and level of anesthesia in an ambulatory surgery center.    Description of Procedure:  After informed consent, the patient was brought to the procedure room and placed onto the x-ray table in the prone position. The posterior lumbar region was prepped and draped in the usual sterile fashion. I used AP fluoroscopy to visualize the lumbar spine and identify the lateral facet columns. I then proceeded to ablate the lumbar medial branches at the above noted levels using the following technique:  I rotated the fluoroscope oblique until I was able to visualize the groove between the superior articular and transverse processes of the specific spinal level. I then raised a skin wheal overlying this target associated with the specific medial branch nerve and then passed a 10mm, 10cm, 18 gauge, curved tip needle through the skin wheal and down to the periosteum of the target site. I then navigated the needle tip into optimal position for medial branch neurotomy using AP and lateral fluoroscopy. Once the needle was in the region of the medial branch nerve, I began the stimulation phase of the procedure.  I stimulated each needle in sequential fashion with 50 HZ current to 1 volt and then with 2 HZ current to 2 volts. I then repositioned the needles until an optimal stimulation pattern was obtained. Once the stimulation had been completed and the needles optimally positioned, I then saved images to disc on both AP and lateral views. I then blocked the medial branch nerve by injecting the needle with 0.5mL lidocaine 2%. I created a radiofrequency lesion for 90 seconds duration at 90 degrees centigrade. Bupivacaine 0.25% was injected in divided doses onto the medial branch nerves ablated. The fluoroscopy time for this procedure is recorded in nursing notes. A similar procedure was carried  out for each of the medial branch neurotomies. All needles were placed easily without trauma.    COMPLICATIONS:  Upon completion of the procedure, the patient was moved to the recovery room for observation. No immediate complications occurred.    PROCEDURE IMAGES: saved to the chart    Follow up in clinic as needed

## 2019-12-17 NOTE — DISCHARGE INSTRUCTIONS
Home Care Instructions after a Radio Frequency Ablation    A radiofrequency ablation is a procedure that destroys the functionality of the nerve using radiofrequency energy. There are two primary types of radiofrequency ablation: A medial branch neurotomy (ablation) affects the nerves carrying pain from the facet joints, while a lateral branch neurotomy (ablation) affects nerves that carry pain from the sacroiliac joints. The physician uses x-ray guidance (fluoroscopy) to direct a special (radiofrequency) needle alongside the medial or lateral branch nerves. A small amount of electrical current is often carefully passed through the needle to assure it is next to the target nerve and a safe distance from other nerves. This current should briefly recreate the usual pain and cause a muscle twitch in the target area. The nerves will then be numbed to minimize pain while the lesion is being created. The radiofrequency waves are introduced to heat the tip of the needle and a heat lesion is created on the nerve to disrupt the nerve's ability to send pain signals. Please follow the aftercare instructions regarding your procedure.    Activity  -Rest today  -Do not work today  -Resume normal activity in 2-3 days  -No heavy lifting, turning or twisting for 24 hours  -DO NOT shower or soak in tub for 24 hours  -DO NOT remove bandaid for 24 hours    Pain  -You may experience soreness at the injection site for one or two days  -You may use an ice pack for 20 minutes every 2 hours for the first 24 hours, longer if needed for comfort, do not use heat  -You may use Tylenol (acetaminophen) every 4 hours or other pain medicines as directed by your physician  -If other pain medications are prescribed by your physician, please follow dosing instructions carefully.    What to expect  You may experience numbness radiating into your legs or arms (depending on the procedure location). This numbness may last several hours. Until sensation  returns to normal, please use caution in walking, climbing stairs, and stepping out of your vehicle, etc. Relief of your initial symptoms can take up to 4 weeks to feel better, this is normal and due to the healing process. The procedure site may feel like a deep burn. Using ice will greatly minimize this discomfort. Do not use numbing creams or patches over your injection sites immediately following your procedure. Please keep injection sites covered, clean and dry for 24 hours.    DID YOU RECEIVE SEDATION TODAY?  Yes    Safety  Sedation medicine, if given, may remain active for many hours. It is important for the next 24 hours that you do not:  -Drive a car  -Operate machines or power tools  -Consume alcohol, including beer  -Sign any important papers or legal documents  -Please have a responsible adult with you for 24 hours following any sedation    DID YOU RECEIVE STEROIDS TODAY?  Yes    Common side effects of steroids:  Not everyone will experience corticosteroid side effects. If side effects are experienced, they will gradually subside in the 7-10 day period following an injection. Most common side effects include:  -Flushed face and/or chest  -Feeling of warmth, particularly in the face but could be an overall feeling of warmth  -Increased blood sugar in diabetic patients  -Menstrual irregularities my occur. If taking hormone-based birth control an alternate method of birth control is recommended  -Sleep disturbances and/or mood swings are possible  -Leg cramps      Please contact us if you have:  -Severe pain  -Fever more than 101.5 degrees Fahrenheit  -Signs of infection at the injection site (redness, swelling, or drainage)    If you have questions, please contact our office at [phone] between the hours of 7:00 am and 3:00 pm Monday through Friday. After office hours you can contact the on call provider by dialing [phone]. If you need immediate attention, we recommend that you go to a hospital  emergency room or dial 911.   54

## 2019-12-23 ENCOUNTER — THERAPY VISIT (OUTPATIENT)
Dept: PHYSICAL THERAPY | Facility: CLINIC | Age: 49
End: 2019-12-23
Payer: COMMERCIAL

## 2019-12-23 DIAGNOSIS — M54.6 CHRONIC BILATERAL THORACIC BACK PAIN: ICD-10-CM

## 2019-12-23 DIAGNOSIS — G89.29 CHRONIC BILATERAL THORACIC BACK PAIN: ICD-10-CM

## 2019-12-23 PROCEDURE — 97112 NEUROMUSCULAR REEDUCATION: CPT | Mod: GP | Performed by: PHYSICAL THERAPIST

## 2019-12-23 PROCEDURE — 97110 THERAPEUTIC EXERCISES: CPT | Mod: GP | Performed by: PHYSICAL THERAPIST

## 2020-01-06 ENCOUNTER — DOCUMENTATION ONLY (OUTPATIENT)
Dept: CARE COORDINATION | Facility: CLINIC | Age: 50
End: 2020-01-06

## 2020-01-06 ENCOUNTER — THERAPY VISIT (OUTPATIENT)
Dept: PHYSICAL THERAPY | Facility: CLINIC | Age: 50
End: 2020-01-06
Payer: COMMERCIAL

## 2020-01-06 DIAGNOSIS — G89.29 CHRONIC BILATERAL THORACIC BACK PAIN: ICD-10-CM

## 2020-01-06 DIAGNOSIS — M54.6 CHRONIC BILATERAL THORACIC BACK PAIN: ICD-10-CM

## 2020-01-06 PROCEDURE — 97110 THERAPEUTIC EXERCISES: CPT | Mod: GP | Performed by: PHYSICAL THERAPIST

## 2020-01-06 PROCEDURE — 97112 NEUROMUSCULAR REEDUCATION: CPT | Mod: GP | Performed by: PHYSICAL THERAPIST

## 2020-01-07 ENCOUNTER — TELEPHONE (OUTPATIENT)
Dept: GASTROENTEROLOGY | Facility: CLINIC | Age: 50
End: 2020-01-07

## 2020-01-07 NOTE — TELEPHONE ENCOUNTER
Spoke to patient reminding of appointment scheduled on 1/8/20 at 140pm with [facility] GI clinic. Patient to arrive 15 min early. To reschedule or cancel patient to call [phone].      LUC Salazar

## 2020-01-08 ENCOUNTER — OFFICE VISIT (OUTPATIENT)
Dept: GASTROENTEROLOGY | Facility: CLINIC | Age: 50
End: 2020-01-08
Payer: COMMERCIAL

## 2020-01-08 VITALS
OXYGEN SATURATION: 92 % | WEIGHT: 251.3 LBS | DIASTOLIC BLOOD PRESSURE: 99 MMHG | BODY MASS INDEX: 42.9 KG/M2 | TEMPERATURE: 98.9 F | SYSTOLIC BLOOD PRESSURE: 144 MMHG | HEIGHT: 64 IN | HEART RATE: 78 BPM

## 2020-01-08 DIAGNOSIS — Z12.11 ENCOUNTER FOR SCREENING COLONOSCOPY FOR NON-HIGH-RISK PATIENT: Primary | ICD-10-CM

## 2020-01-08 DIAGNOSIS — R10.12 ABDOMINAL PAIN, LEFT UPPER QUADRANT: ICD-10-CM

## 2020-01-08 ASSESSMENT — PAIN SCALES - GENERAL: PAINLEVEL: NO PAIN (0)

## 2020-01-08 ASSESSMENT — MIFFLIN-ST. JEOR: SCORE: 1754.89

## 2020-01-08 NOTE — NURSING NOTE
"Chief Complaint   Patient presents with     RECHECK     6-8 weeks follow up       Vitals:    01/08/20 1332   BP: (!) 144/99   Pulse: 78   Temp: 98.9  F (37.2  C)   TempSrc: Oral   SpO2: 92%   Weight: 114 kg (251 lb 4.8 oz)   Height: 1.626 m (5' 4\")       Body mass index is 43.14 kg/m .    Zainab Grossman CMA    "

## 2020-01-08 NOTE — LETTER
1/8/2020       RE: Zayra Ramriez  [address] [place] [address]     Dear Colleague,    Thank you for referring your patient, Zayra Ramirez, to the [facility] at [facility]. Please see a copy of my visit note below.    GI CLINIC VISIT    CC/REFERRING MD:  Neena Tam  REASON FOR CONSULTATION: abdominal pain    ASSESSMENT/PLAN:  49-year-old female with past medical history to include depression, GERD (on omeprazole, unsure dose), rheumatoid arthritis on methotrexate weekly and folic acid daily not currently on prednisone, hypertension on lisinopril, interstitial lung disease, cervical spinal stenosis presents to the GI clinic for consultation regarding epigastric abdominal pain.    1. Abdominal pain: Pain is improved, but not resolved.  Patient's description of abdominal pain highly suggest musculoskeletal etiology with ability to reproduce the pain upon palpation and coughing.  There is no association with eating or bowel movements. Pain has been present for since early November.  There are several lifestyle factors that could also be aggravating the abdominal pain such as 5-6 cans of Mountain Dew's a day and smoking 1 pack a day.  She is not experiencing any associated symptoms such as dysphasia or classic reflux although she may have some component of silent reflux.  Given persistent symptoms we will proceed with endoscopic assessment, especially because she is also due for dysplasia screening.  We will move forward with a colonoscopy and EGD.  -- EGD and Colonoscopy to be scheduled    2. Colorectal cancer screening: History of adenomatous polyp in the past.  Normal colonoscopy in 2015 with recommendation to repeat in 5 years, due in 2020.    RTC pending the above    Thank you for this consultation.  It was a pleasure to participate in the care of this patient; please contact us with any further questions.     Abhi Villafuerte  BRE  Division of Gastroenterology, Hepatology and Nutrition  [facility]      HPI  49-year-old female with past medical history to include depression, GERD (on omeprazole, unsure dose), seronegative rheumatoid arthritis on methotrexate weekly and folic acid daily not currently on prednisone, hypertension on lisinopril, interstitial lung disease, cervical spinal stenosis presents to the GI clinic for consultation regarding epigastric abdominal pain.  Patient describes his pain has been going on for the last 2 weeks and wakes her up at night.  She feels that the pain gets worse throughout the day.  She describes it as a numb feeling inside her abdomen.  She can reproduce the pain with palpation.  Pain is also worsened by coughing or moving.  Rest sometimes will improve the pain.  She denies any nausea, vomiting or dysphasia accompanied by this pain.  Bowel movements are average 3 formed to soft stools per day, Gonzales 4-5.  She denies any blood in the stool.  She had a normal colonoscopy in 2015 and recommended to repeat in 5 years.  I do note that she admits to high caffeine intake with 6 Mountain Dew drinks per day.  She smokes 1 pack/day.    Interval hx 1/8/20:  Pain is still present, however is decreased in severity and frequency.  Still no change with BMs or oral intake. Not keeping up at night. Has some breakthrough reflux and reports she has restarted omeprazole, but unsure dose. No dysphagia. Takes naprosyn BID for back pain and Cymbalta daily.     ROS:    No fevers or chills  No weight loss  No blurry vision, double vision or change in vision  No sore throat  No lymphadenopathy  No headache, paraesthesias, or weakness in a limb  No shortness of breath or wheezing  No chest pain or pressure  No arthralgias or myalgias  No rashes or skin changes  No odynophagia or dysphagia  No BRBPR, hematochezia, melena  No dysuria, frequency or urgency  No hot/cold intolerance or polyria  + anxiety or  depression    PROBLEM LIST  Patient Active Problem List    Diagnosis Date Noted     Morbid (severe) obesity due to excess calories (H) 08/08/2015     Priority: High     Chronic bilateral thoracic back pain 12/10/2019     Priority: Medium     Inflammatory arthritis 11/27/2019     Priority: Medium     Lumbar spondylosis 11/04/2019     Priority: Medium     Added automatically from request for surgery [id number]       Spinal epidural abscess 08/19/2019     Priority: Medium     Respiratory bronchiolitis interstitial lung disease (H)      Priority: Medium     Subcutaneous emphysema (H) 04/05/2018     Priority: Medium     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) 10/13/2017     Priority: Medium     Interstitial lung disease (H) 11/17/2016     Priority: Medium     Overview:   Patient sees Pulm       Esophageal stricture 07/18/2016     Priority: Medium     Overview:   With Hiatal hernia; on Acid blocker lifelong       Stress 06/15/2016     Priority: Medium     Hypoxia 06/08/2016     Priority: Medium     Onychomycosis 06/01/2016     Priority: Medium     Restless leg syndrome 06/01/2016     Priority: Medium     Smoker 11/02/2015     Priority: Medium     Dental abscess 08/08/2015     Priority: Medium     Facial cellulitis 03/05/2015     Priority: Medium     Chronic midline low back pain 11/10/2014     Priority: Medium     Diagnosis updated by automated process. Provider to review and confirm.       HTN (hypertension) 01/28/2013     Priority: Medium     Borderline personality disorder (H) 01/12/2011     Priority: Medium     GERD (gastroesophageal reflux disease) 03/23/2010     Priority: Medium     Moderate persistent asthma without complication 03/23/2010     Priority: Medium     Overview:   Patient sees Pulm       Seasonal allergies 03/23/2010     Priority: Medium       PERTINENT PAST MEDICAL HISTORY:  See HPI  Past Medical History:   Diagnosis Date     Allergic rhinitis      Anemia      Arthritis      Asthma     copd      Dental abscess 8-2015     Depressive disorder      Gastroesophageal reflux disease      History of emphysema      Hoarseness      Hypertension      Morbid obesity with BMI of 40.0-44.9, adult (H)      Obstructive sleep apnea      Respiratory bronchiolitis interstitial lung disease (H)      Sleep apnea        PREVIOUS SURGERIES:  Hysterectomy  Laminectomy   Past Surgical History:   Procedure Laterality Date     CHOLECYSTECTOMY       COLONOSCOPY       ENT SURGERY       EXCISE LESION INTRAORAL Bilateral 10/3/2018    Procedure: EXCISE LESION INTRAORAL;  Wide Local Excision Of of Left Oral Cavity Ulcer;  Surgeon: Morro Mijares MD;  Location: [facility] OR     HC DRAIN SKIN ABSCESS SIMPLE/SINGLE  3/16/2012    Procedure:INCISION AND DRAINAGE, ABSCESS, SIMPLE; Surgeon:CHRISTIANO HANCOCK; Location: GI     HEAD & NECK SURGERY       HYSTERECTOMY       INCISION AND DRAINAGE ABDOMEN WASHOUT, COMBINED       LAMINECTOMY THORACIC ONE LEVEL N/A 8/19/2019    Procedure: LAMINECTOMY, SPINE, THORACIC, 11-12 and Part of Lumbar 1, DRAINAGE OF EPIDURAL ABCESS, Epidural Drain Placement X 2;  Surgeon: Yadiel Beal MD;  Location: [facility] OR     RADIO FREQUENCY ABLATION / DESTRUCTION OF SACROILOAC JOINT DORSAL PRIMARY RAMUS Bilateral 12/17/2019    Procedure: Bilateral lumbar radiofrequency ablation with fluoroscopy and intravenous sedation ( Lumbar 2,3,4,5 medial branch nerves for the bilateral lumbar3-4, 4-5 and 5-sacral1 joints.;  Surgeon: Tram Florian MD;  Location: [facility] OR     spinal cord stimulator  08/08/2019     spinal cord stimulator removal  08/13/2019       PREVIOUS ENDOSCOPY:  Last colonoscopy 2015, normal. Hx of abnormal polyps    ALLERGIES:     Allergies   Allergen Reactions     Bee Venom Anaphylaxis     Bees      Doxycycline Anaphylaxis     Patient thinks it may have been just nausea and vomiting, however unable to confirm     Erythromycin Anaphylaxis and Shortness Of Breath     Other reaction(s): Vomiting      Hydrocodone-Acetaminophen Itching       PERTINENT MEDICATIONS:    Current Outpatient Medications:      albuterol (PROAIR HFA, PROVENTIL HFA, VENTOLIN HFA) 108 (90 BASE) MCG/ACT inhaler, Inhale 2 puffs into the lungs every 6 hours as needed for shortness of breath / dyspnea or wheezing, Disp: , Rfl:      ARIPiprazole (ABILIFY) 15 MG tablet, Take 15 mg by mouth daily , Disp: , Rfl:      cholecalciferol ( ULTRA STRENGTH) 2000 units CAPS, TAKE ONE CAPSULE BY MOUTH DAILY IN AM, Disp: , Rfl:      cyclobenzaprine (FLEXERIL) 10 MG tablet, Take 1 tablet (10 mg) by mouth 3 times daily, Disp: 30 tablet, Rfl: 0     DULoxetine (CYMBALTA) 60 MG capsule, Take 120 mg by mouth daily , Disp: , Rfl:      ferrous sulfate (FEROSUL) 325 (65 Fe) MG tablet, Take 325 mg by mouth daily, Disp: , Rfl: 0     fluticasone-salmeterol (ADVAIR) 500-50 MCG/DOSE diskus inhaler, Inhale 1 puff into the lungs every 12 hours, Disp: , Rfl:      folic acid (FOLVITE) 1 MG tablet, Take 1 tablet (1 mg) by mouth daily, Disp: 90 tablet, Rfl: 3     lisinopril-hydrochlorothiazide (PRINZIDE/ZESTORETIC) 20-25 MG per tablet, Take 1 tablet by mouth daily, Disp: 30 tablet, Rfl: 0     methotrexate 2.5 MG tablet, Take 9 tabs once weekly, Disp: 96 tablet, Rfl: 2     montelukast (SINGULAIR) 10 MG tablet, Take 10 mg by mouth At Bedtime, Disp: , Rfl:      OXYGEN-HELIUM IN, 2-3L PRN during the day, 3L @ night, Disp: , Rfl:      predniSONE (DELTASONE) 5 MG tablet, TAKE 3 TABLETS BY MOUTH DAILY FOR 10 DAYS, THEN 2 TABS DAILY FOR 10 DAYS, THEN OFF., Disp: 50 tablet, Rfl: 1     rOPINIRole (REQUIP) 2 MG tablet, Take 2 mg by mouth nightly as needed , Disp: , Rfl:      vitamin C 500 MG TABS, Take 1 tablet (500 mg) by mouth 2 times daily, Disp: , Rfl:      zinc sulfate (ZINCATE) 220 (50 Zn) MG capsule, Take 1 capsule (220 mg) by mouth daily, Disp: , Rfl:   No current facility-administered medications for this visit.     Facility-Administered Medications Ordered in Other Visits:       Lidocaine 1 % injection 0.5-5 mL, 0.5-5 mL, Other, Once RACHANA, Gutierrez Candelario MD     sodium chloride (PF) 0.9% PF flush 5-50 mL, 5-50 mL, Intracatheter, Once PRAndree AZEVEDO Steven, MD    SOCIAL HISTORY:  Currently not working. [employer]ening  Social History     Socioeconomic History     Marital status: Single     Spouse name: Not on file     Number of children: Not on file     Years of education: Not on file     Highest education level: Not on file   Occupational History     Not on file   Social Needs     Financial resource strain: Not on file     Food insecurity:     Worry: Not on file     Inability: Not on file     Transportation needs:     Medical: Not on file     Non-medical: Not on file   Tobacco Use     Smoking status: Current Every Day Smoker     Packs/day: 1.00     Years: 30.00     Pack years: 30.00     Types: Cigarettes     Start date: 1/1/1996     Smokeless tobacco: Never Used   Substance and Sexual Activity     Alcohol use: No     Binge frequency: Monthly     Drug use: No     Sexual activity: Never   Lifestyle     Physical activity:     Days per week: Not on file     Minutes per session: Not on file     Stress: Not on file   Relationships     Social connections:     Talks on phone: Not on file     Gets together: Not on file     Attends Cheondo[employer] service: Not on file     Active member of club or organization: Not on file     Attends meetings of clubs or organizations: Not on file     Relationship status: Not on file     Intimate partner violence:     Fear of current or ex partner: Not on file     Emotionally abused: Not on file     Physically abused: Not on file     Forced sexual activity: Not on file   Other Topics Concern     Parent/sibling w/ CABG, MI or angioplasty before 65F 55M? Not Asked   Social History Narrative     Not on file       FAMILY HISTORY:  FH of CRC: None  FH of IBD: None  Family History   Problem Relation Age of Onset     Other Cancer Father         base of tongue cancer at age  "~65     Hypertension Father      Back Pain Father      Asthma Mother        Past/family/social history reviewed and no changes    PHYSICAL EXAMINATION:  Constitutional: aaox3, cooperative, pleasant, not dyspneic/diaphoretic, no acute distress  Vitals reviewed: BP (!) 144/99   Pulse 78   Temp 98.9  F (37.2  C) (Oral)   Ht 1.626 m (5' 4\")   Wt 114 kg (251 lb 4.8 oz)   LMP  (LMP Unknown)   SpO2 92%   BMI 43.14 kg/m     Wt:   Wt Readings from Last 2 Encounters:   12/05/19 113.4 kg (250 lb)   11/26/19 116.8 kg (257 lb 9.6 oz)      Eyes: Sclera anicteric/injected  Ears/nose/mouth/throat: Normal oropharynx without ulcers or exudate, mucus membranes moist, hearing intact  Neck: supple, thyroid normal size  CV: No edema  Respiratory: Unlabored breathing  Lymph: No axillary, submandibular, supraclavicular or inguinal lymphadenopathy  Abd: obese, mild pain to palpation to epigastric area, nondistended, +bs, no hepatosplenomegaly, no peritoneal signs  Skin: warm, perfused, no jaundice  Psych: Normal affect  MSK: Normal gait      PERTINENT STUDIES:    Orders Only on 09/01/2019   Component Date Value Ref Range Status     WBC 09/23/2019 6.4  4.0 - 11.0 10e9/L Final     RBC Count 09/23/2019 4.04  3.8 - 5.2 10e12/L Final     Hemoglobin 09/23/2019 12.7  11.7 - 15.7 g/dL Final     Hematocrit 09/23/2019 38.9  35.0 - 47.0 % Final     MCV 09/23/2019 96  78 - 100 fl Final     MCH 09/23/2019 31.4  26.5 - 33.0 pg Final     MCHC 09/23/2019 32.6  31.5 - 36.5 g/dL Final     RDW 09/23/2019 13.6  10.0 - 15.0 % Final     Platelet Count 09/23/2019 220  150 - 450 10e9/L Final     Diff Method 09/23/2019 Automated Method   Final     % Neutrophils 09/23/2019 64.1  % Final     % Lymphocytes 09/23/2019 21.9  % Final     % Monocytes 09/23/2019 8.3  % Final     % Eosinophils 09/23/2019 4.4  % Final     % Basophils 09/23/2019 0.8  % Final     % Immature Granulocytes 09/23/2019 0.5  % Final     Nucleated RBCs 09/23/2019 0  0 /100 Final     Absolute " Neutrophil 09/23/2019 4.1  1.6 - 8.3 10e9/L Final     Absolute Lymphocytes 09/23/2019 1.4  0.8 - 5.3 10e9/L Final     Absolute Monocytes 09/23/2019 0.5  0.0 - 1.3 10e9/L Final     Absolute Eosinophils 09/23/2019 0.3  0.0 - 0.7 10e9/L Final     Absolute Basophils 09/23/2019 0.1  0.0 - 0.2 10e9/L Final     Abs Immature Granulocytes 09/23/2019 0.0  0 - 0.4 10e9/L Final     Absolute Nucleated RBC 09/23/2019 0.0   Final     Sodium 09/23/2019 140  133 - 144 mmol/L Final     Potassium 09/23/2019 3.5  3.4 - 5.3 mmol/L Final     Chloride 09/23/2019 106  94 - 109 mmol/L Final     Carbon Dioxide 09/23/2019 31  20 - 32 mmol/L Final     Anion Gap 09/23/2019 3  3 - 14 mmol/L Final     Glucose 09/23/2019 96  70 - 99 mg/dL Final     Urea Nitrogen 09/23/2019 9  7 - 30 mg/dL Final     Creatinine 09/23/2019 0.57  0.52 - 1.04 mg/dL Final     GFR Estimate 09/23/2019 >90  >60 mL/min/[1.73_m2] Final     GFR Estimate If Black 09/23/2019 >90  >60 mL/min/[1.73_m2] Final     Calcium 09/23/2019 8.8  8.5 - 10.1 mg/dL Final     Bilirubin Total 09/23/2019 0.2  0.2 - 1.3 mg/dL Final     Albumin 09/23/2019 3.5  3.4 - 5.0 g/dL Final     Protein Total 09/23/2019 6.6* 6.8 - 8.8 g/dL Final     Alkaline Phosphatase 09/23/2019 86  40 - 150 U/L Final     ALT 09/23/2019 8  0 - 50 U/L Final     AST 09/23/2019 18  0 - 45 U/L Final     CRP Inflammation 09/23/2019 15.4* 0.0 - 8.0 mg/L Final     Bilirubin Direct 09/23/2019 <0.1  0.0 - 0.2 mg/dL Final     Magnesium 09/23/2019 1.9  1.6 - 2.3 mg/dL Final     Phosphorus 09/23/2019 3.0  2.5 - 4.5 mg/dL Final     Triglycerides 09/23/2019 68  <150 mg/dL Final     Sed Rate 09/23/2019 15  0 - 20 mm/h Final       Again, thank you for allowing me to participate in the care of your patient.      Sincerely,    Abhi Villafuerte PA-C

## 2020-01-08 NOTE — PROGRESS NOTES
GI CLINIC VISIT    CC/REFERRING MD:  Neena Tam  REASON FOR CONSULTATION: abdominal pain    ASSESSMENT/PLAN:  49-year-old female with past medical history to include depression, GERD (on omeprazole, unsure dose), rheumatoid arthritis on methotrexate weekly and folic acid daily not currently on prednisone, hypertension on lisinopril, interstitial lung disease, cervical spinal stenosis presents to the GI clinic for consultation regarding epigastric abdominal pain.    1. Abdominal pain: Pain is improved, but not resolved.  Patient's description of abdominal pain highly suggest musculoskeletal etiology with ability to reproduce the pain upon palpation and coughing.  There is no association with eating or bowel movements. Pain has been present for since early November.  There are several lifestyle factors that could also be aggravating the abdominal pain such as 5-6 cans of Mountain Dew's a day and smoking 1 pack a day.  She is not experiencing any associated symptoms such as dysphasia or classic reflux although she may have some component of silent reflux.  Given persistent symptoms we will proceed with endoscopic assessment, especially because she is also due for dysplasia screening.  We will move forward with a colonoscopy and EGD.  -- EGD and Colonoscopy to be scheduled    2. Colorectal cancer screening: History of adenomatous polyp in the past.  Normal colonoscopy in 2015 with recommendation to repeat in 5 years, due in 2020.    RTC pending the above    Thank you for this consultation.  It was a pleasure to participate in the care of this patient; please contact us with any further questions.       Abhi Villafuerte PA-C  Division of Gastroenterology, Hepatology and Nutrition  [facility]      HPI  49-year-old female with past medical history to include depression, GERD (on omeprazole, unsure dose), seronegative rheumatoid arthritis on methotrexate weekly and folic acid daily not currently on  prednisone, hypertension on lisinopril, interstitial lung disease, cervical spinal stenosis presents to the GI clinic for consultation regarding epigastric abdominal pain.  Patient describes his pain has been going on for the last 2 weeks and wakes her up at night.  She feels that the pain gets worse throughout the day.  She describes it as a numb feeling inside her abdomen.  She can reproduce the pain with palpation.  Pain is also worsened by coughing or moving.  Rest sometimes will improve the pain.  She denies any nausea, vomiting or dysphasia accompanied by this pain.  Bowel movements are average 3 formed to soft stools per day, [place] 4-5.  She denies any blood in the stool.  She had a normal colonoscopy in 2015 and recommended to repeat in 5 years.  I do note that she admits to high caffeine intake with 6 Mountain Dew drinks per day.  She smokes 1 pack/day.    Interval hx 1/8/20:  Pain is still present, however is decreased in severity and frequency.  Still no change with BMs or oral intake. Not keeping up at night. Has some breakthrough reflux and reports she has restarted omeprazole, but unsure dose. No dysphagia. Takes naprosyn BID for back pain and Cymbalta daily.     ROS:    No fevers or chills  No weight loss  No blurry vision, double vision or change in vision  No sore throat  No lymphadenopathy  No headache, paraesthesias, or weakness in a limb  No shortness of breath or wheezing  No chest pain or pressure  No arthralgias or myalgias  No rashes or skin changes  No odynophagia or dysphagia  No BRBPR, hematochezia, melena  No dysuria, frequency or urgency  No hot/cold intolerance or polyria  + anxiety or depression    PROBLEM LIST  Patient Active Problem List    Diagnosis Date Noted     Morbid (severe) obesity due to excess calories (H) 08/08/2015     Priority: High     Chronic bilateral thoracic back pain 12/10/2019     Priority: Medium     Inflammatory arthritis 11/27/2019     Priority: Medium      Lumbar spondylosis 11/04/2019     Priority: Medium     Added automatically from request for surgery [id number]       Spinal epidural abscess 08/19/2019     Priority: Medium     Respiratory bronchiolitis interstitial lung disease (H)      Priority: Medium     Subcutaneous emphysema (H) 04/05/2018     Priority: Medium     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) 10/13/2017     Priority: Medium     Interstitial lung disease (H) 11/17/2016     Priority: Medium     Overview:   Patient sees Pulm       Esophageal stricture 07/18/2016     Priority: Medium     Overview:   With Hiatal hernia; on Acid blocker lifelong       Stress 06/15/2016     Priority: Medium     Hypoxia 06/08/2016     Priority: Medium     Onychomycosis 06/01/2016     Priority: Medium     Restless leg syndrome 06/01/2016     Priority: Medium     Smoker 11/02/2015     Priority: Medium     Dental abscess 08/08/2015     Priority: Medium     Facial cellulitis 03/05/2015     Priority: Medium     Chronic midline low back pain 11/10/2014     Priority: Medium     Diagnosis updated by automated process. Provider to review and confirm.       HTN (hypertension) 01/28/2013     Priority: Medium     Borderline personality disorder (H) 01/12/2011     Priority: Medium     GERD (gastroesophageal reflux disease) 03/23/2010     Priority: Medium     Moderate persistent asthma without complication 03/23/2010     Priority: Medium     Overview:   Patient sees Pulm       Seasonal allergies 03/23/2010     Priority: Medium       PERTINENT PAST MEDICAL HISTORY:  See HPI  Past Medical History:   Diagnosis Date     Allergic rhinitis      Anemia      Arthritis      Asthma     copd     Dental abscess 8-2015     Depressive disorder      Gastroesophageal reflux disease      History of emphysema      Hoarseness      Hypertension      Morbid obesity with BMI of 40.0-44.9, adult (H)      Obstructive sleep apnea      Respiratory bronchiolitis interstitial lung disease (H)      Sleep apnea         PREVIOUS SURGERIES:  Hysterectomy  Laminectomy   Past Surgical History:   Procedure Laterality Date     CHOLECYSTECTOMY       COLONOSCOPY       ENT SURGERY       EXCISE LESION INTRAORAL Bilateral 10/3/2018    Procedure: EXCISE LESION INTRAORAL;  Wide Local Excision Of of Left Oral Cavity Ulcer;  Surgeon: Morro Mijares MD;  Location: [facility] OR     HC DRAIN SKIN ABSCESS SIMPLE/SINGLE  3/16/2012    Procedure:INCISION AND DRAINAGE, ABSCESS, SIMPLE; Surgeon:CHRISTIANO HANCOCK; Location: GI     HEAD & NECK SURGERY       HYSTERECTOMY       INCISION AND DRAINAGE ABDOMEN WASHOUT, COMBINED       LAMINECTOMY THORACIC ONE LEVEL N/A 8/19/2019    Procedure: LAMINECTOMY, SPINE, THORACIC, 11-12 and Part of Lumbar 1, DRAINAGE OF EPIDURAL ABCESS, Epidural Drain Placement X 2;  Surgeon: Yadiel Beal MD;  Location: [facility] OR     RADIO FREQUENCY ABLATION / DESTRUCTION OF SACROILOAC JOINT DORSAL PRIMARY RAMUS Bilateral 12/17/2019    Procedure: Bilateral lumbar radiofrequency ablation with fluoroscopy and intravenous sedation ( Lumbar 2,3,4,5 medial branch nerves for the bilateral lumbar3-4, 4-5 and 5-sacral1 joints.;  Surgeon: Tram Florian MD;  Location: [facility] OR     spinal cord stimulator  08/08/2019     spinal cord stimulator removal  08/13/2019       PREVIOUS ENDOSCOPY:  Last colonoscopy 2015, normal. Hx of abnormal polyps    ALLERGIES:     Allergies   Allergen Reactions     Bee Venom Anaphylaxis     Bees      Doxycycline Anaphylaxis     Patient thinks it may have been just nausea and vomiting, however unable to confirm     Erythromycin Anaphylaxis and Shortness Of Breath     Other reaction(s): Vomiting     Hydrocodone-Acetaminophen Itching       PERTINENT MEDICATIONS:    Current Outpatient Medications:      albuterol (PROAIR HFA, PROVENTIL HFA, VENTOLIN HFA) 108 (90 BASE) MCG/ACT inhaler, Inhale 2 puffs into the lungs every 6 hours as needed for shortness of breath / dyspnea or wheezing, Disp: , Rfl:       ARIPiprazole (ABILIFY) 15 MG tablet, Take 15 mg by mouth daily , Disp: , Rfl:      cholecalciferol ( ULTRA STRENGTH) 2000 units CAPS, TAKE ONE CAPSULE BY MOUTH DAILY IN AM, Disp: , Rfl:      cyclobenzaprine (FLEXERIL) 10 MG tablet, Take 1 tablet (10 mg) by mouth 3 times daily, Disp: 30 tablet, Rfl: 0     DULoxetine (CYMBALTA) 60 MG capsule, Take 120 mg by mouth daily , Disp: , Rfl:      ferrous sulfate (FEROSUL) 325 (65 Fe) MG tablet, Take 325 mg by mouth daily, Disp: , Rfl: 0     fluticasone-salmeterol (ADVAIR) 500-50 MCG/DOSE diskus inhaler, Inhale 1 puff into the lungs every 12 hours, Disp: , Rfl:      folic acid (FOLVITE) 1 MG tablet, Take 1 tablet (1 mg) by mouth daily, Disp: 90 tablet, Rfl: 3     lisinopril-hydrochlorothiazide (PRINZIDE/ZESTORETIC) 20-25 MG per tablet, Take 1 tablet by mouth daily, Disp: 30 tablet, Rfl: 0     methotrexate 2.5 MG tablet, Take 9 tabs once weekly, Disp: 96 tablet, Rfl: 2     montelukast (SINGULAIR) 10 MG tablet, Take 10 mg by mouth At Bedtime, Disp: , Rfl:      OXYGEN-HELIUM IN, 2-3L PRN during the day, 3L @ night, Disp: , Rfl:      predniSONE (DELTASONE) 5 MG tablet, TAKE 3 TABLETS BY MOUTH DAILY FOR 10 DAYS, THEN 2 TABS DAILY FOR 10 DAYS, THEN OFF., Disp: 50 tablet, Rfl: 1     rOPINIRole (REQUIP) 2 MG tablet, Take 2 mg by mouth nightly as needed , Disp: , Rfl:      vitamin C 500 MG TABS, Take 1 tablet (500 mg) by mouth 2 times daily, Disp: , Rfl:      zinc sulfate (ZINCATE) 220 (50 Zn) MG capsule, Take 1 capsule (220 mg) by mouth daily, Disp: , Rfl:   No current facility-administered medications for this visit.     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  Social History     Socioeconomic History     Marital  status: Single     Spouse name: Not on file     Number of children: Not on file     Years of education: Not on file     Highest education level: Not on file   Occupational History     Not on file   Social Needs     Financial resource strain: Not on file     Food insecurity:     Worry: Not on file     Inability: Not on file     Transportation needs:     Medical: Not on file     Non-medical: Not on file   Tobacco Use     Smoking status: Current Every Day Smoker     Packs/day: 1.00     Years: 30.00     Pack years: 30.00     Types: Cigarettes     Start date: 1/1/1996     Smokeless tobacco: Never Used   Substance and Sexual Activity     Alcohol use: No     Binge frequency: Monthly     Drug use: No     Sexual activity: Never   Lifestyle     Physical activity:     Days per week: Not on file     Minutes per session: Not on file     Stress: Not on file   Relationships     Social connections:     Talks on phone: Not on file     Gets together: Not on file     Attends Zoroastrianism service: Not on file     Active member of club or organization: Not on file     Attends meetings of clubs or organizations: Not on file     Relationship status: Not on file     Intimate partner violence:     Fear of current or ex partner: Not on file     Emotionally abused: Not on file     Physically abused: Not on file     Forced sexual activity: Not on file   Other Topics Concern     Parent/sibling w/ CABG, MI or angioplasty before 65F 55M? Not Asked   Social History Narrative     Not on file       FAMILY HISTORY:  FH of CRC: None  FH of IBD: None  Family History   Problem Relation Age of Onset     Other Cancer Father         base of tongue cancer at age ~65     Hypertension Father      Back Pain Father      Asthma Mother        Past/family/social history reviewed and no changes    PHYSICAL EXAMINATION:  Constitutional: aaox3, cooperative, pleasant, not dyspneic/diaphoretic, no acute distress  Vitals reviewed: BP (!) 144/99   Pulse 78   Temp 98.9  F  "(37.2  C) (Oral)   Ht 1.626 m (5' 4\")   Wt 114 kg (251 lb 4.8 oz)   LMP  (LMP Unknown)   SpO2 92%   BMI 43.14 kg/m    Wt:   Wt Readings from Last 2 Encounters:   12/05/19 113.4 kg (250 lb)   11/26/19 116.8 kg (257 lb 9.6 oz)      Eyes: Sclera anicteric/injected  Ears/nose/mouth/throat: Normal oropharynx without ulcers or exudate, mucus membranes moist, hearing intact  Neck: supple, thyroid normal size  CV: No edema  Respiratory: Unlabored breathing  Lymph: No axillary, submandibular, supraclavicular or inguinal lymphadenopathy  Abd: obese, mild pain to palpation to epigastric area, nondistended, +bs, no hepatosplenomegaly, no peritoneal signs  Skin: warm, perfused, no jaundice  Psych: Normal affect  MSK: Normal gait      PERTINENT STUDIES:    Orders Only on 09/01/2019   Component Date Value Ref Range Status     WBC 09/23/2019 6.4  4.0 - 11.0 10e9/L Final     RBC Count 09/23/2019 4.04  3.8 - 5.2 10e12/L Final     Hemoglobin 09/23/2019 12.7  11.7 - 15.7 g/dL Final     Hematocrit 09/23/2019 38.9  35.0 - 47.0 % Final     MCV 09/23/2019 96  78 - 100 fl Final     MCH 09/23/2019 31.4  26.5 - 33.0 pg Final     MCHC 09/23/2019 32.6  31.5 - 36.5 g/dL Final     RDW 09/23/2019 13.6  10.0 - 15.0 % Final     Platelet Count 09/23/2019 220  150 - 450 10e9/L Final     Diff Method 09/23/2019 Automated Method   Final     % Neutrophils 09/23/2019 64.1  % Final     % Lymphocytes 09/23/2019 21.9  % Final     % Monocytes 09/23/2019 8.3  % Final     % Eosinophils 09/23/2019 4.4  % Final     % Basophils 09/23/2019 0.8  % Final     % Immature Granulocytes 09/23/2019 0.5  % Final     Nucleated RBCs 09/23/2019 0  0 /100 Final     Absolute Neutrophil 09/23/2019 4.1  1.6 - 8.3 10e9/L Final     Absolute Lymphocytes 09/23/2019 1.4  0.8 - 5.3 10e9/L Final     Absolute Monocytes 09/23/2019 0.5  0.0 - 1.3 10e9/L Final     Absolute Eosinophils 09/23/2019 0.3  0.0 - 0.7 10e9/L Final     Absolute Basophils 09/23/2019 0.1  0.0 - 0.2 10e9/L Final     " Abs Immature Granulocytes 09/23/2019 0.0  0 - 0.4 10e9/L Final     Absolute Nucleated RBC 09/23/2019 0.0   Final     Sodium 09/23/2019 140  133 - 144 mmol/L Final     Potassium 09/23/2019 3.5  3.4 - 5.3 mmol/L Final     Chloride 09/23/2019 106  94 - 109 mmol/L Final     Carbon Dioxide 09/23/2019 31  20 - 32 mmol/L Final     Anion Gap 09/23/2019 3  3 - 14 mmol/L Final     Glucose 09/23/2019 96  70 - 99 mg/dL Final     Urea Nitrogen 09/23/2019 9  7 - 30 mg/dL Final     Creatinine 09/23/2019 0.57  0.52 - 1.04 mg/dL Final     GFR Estimate 09/23/2019 >90  >60 mL/min/[1.73_m2] Final     GFR Estimate If Black 09/23/2019 >90  >60 mL/min/[1.73_m2] Final     Calcium 09/23/2019 8.8  8.5 - 10.1 mg/dL Final     Bilirubin Total 09/23/2019 0.2  0.2 - 1.3 mg/dL Final     Albumin 09/23/2019 3.5  3.4 - 5.0 g/dL Final     Protein Total 09/23/2019 6.6* 6.8 - 8.8 g/dL Final     Alkaline Phosphatase 09/23/2019 86  40 - 150 U/L Final     ALT 09/23/2019 8  0 - 50 U/L Final     AST 09/23/2019 18  0 - 45 U/L Final     CRP Inflammation 09/23/2019 15.4* 0.0 - 8.0 mg/L Final     Bilirubin Direct 09/23/2019 <0.1  0.0 - 0.2 mg/dL Final     Magnesium 09/23/2019 1.9  1.6 - 2.3 mg/dL Final     Phosphorus 09/23/2019 3.0  2.5 - 4.5 mg/dL Final     Triglycerides 09/23/2019 68  <150 mg/dL Final     Sed Rate 09/23/2019 15  0 - 20 mm/h Final

## 2020-01-08 NOTE — PATIENT INSTRUCTIONS
It was a pleasure taking care of you today.  I've included a brief summary of our discussion and care plan from today's visit below.  Please review this information with your primary care provider.  ______________________________________________________________________    My recommendations are summarized as follows:    -- Colonoscopy and upper endoscopy to be scheduled  -- bring dose of your omeprazole with you to determine if there needs to be an adjustment in your dose   ______________________________________________________________________    Who do I call with any questions after my visit?  Please be in touch if there are any further questions that arise following today's visit.  There are multiple ways to contact your gastroenterology care team.        During business hours, you may reach a Gastroenterology nurse at [phone], option [phone].       To schedule or reschedule an appointment, please call [phone].       You can always send a secure message through [employer].  [employer] messages are answered by your nurse or doctor typically within 24 hours.  Please allow extra time on weekends and holidays.        For urgent/emergent questions after business hours, you may reach the on-call GI Fellow by contacting the [facility]  at [phone].      In order for your refill to be processed in a timely fashion, it is your responsibility to ensure you follow the recommendations from your provider regarding your laboratory studies and follow up appointments.       How will I get the results of any tests ordered?    You will receive all of your results.  If you have signed up for [employer], any tests ordered at your visit will be available to you after your physician reviews them.  Typically this takes 1-2 weeks.  If there are urgent results that require a change in your care plan, your physician or nurse will call you to discuss the next steps.      What is [employer]?  [employer] is a secure way for you  to access all of your healthcare records from the [facility].  It is a web based computer program, so you can sign on to it from any location.  It also allows you to send secure messages to your care team.  I recommend signing up for [employer] access if you have not already done so and are comfortable with using a computer.      How to I schedule a follow-up visit?  If you did not schedule a follow-up visit today, please call [phone] to schedule a follow-up office visit.        Sincerely,    Abhi Villafuerte PA-C  [facility]  Division of Gastroenterology

## 2020-01-13 ENCOUNTER — THERAPY VISIT (OUTPATIENT)
Dept: PHYSICAL THERAPY | Facility: CLINIC | Age: 50
End: 2020-01-13
Payer: COMMERCIAL

## 2020-01-13 DIAGNOSIS — G89.29 CHRONIC BILATERAL THORACIC BACK PAIN: ICD-10-CM

## 2020-01-13 DIAGNOSIS — M54.6 CHRONIC BILATERAL THORACIC BACK PAIN: ICD-10-CM

## 2020-01-13 PROCEDURE — 97112 NEUROMUSCULAR REEDUCATION: CPT | Mod: GP | Performed by: PHYSICAL THERAPIST

## 2020-01-13 PROCEDURE — 97110 THERAPEUTIC EXERCISES: CPT | Mod: GP | Performed by: PHYSICAL THERAPIST

## 2020-01-20 NOTE — TELEPHONE ENCOUNTER
FUTURE VISIT INFORMATION      SURGERY INFORMATION:    Date: 20    Location: [facility]    Surgeon:  Dr Julian Sotomayor    Anesthesia Type:  Monitor [facility]    Procedure: ESOPHAGOGASTRODUODENOSCOPY (EGD), COLONOSCOPY    Consult: 20 - [facility]     RECORDS REQUESTED FROM:       Primary Care Provider: Dr Adam Del Toro    Pertinent Medical History: spinal epidural abscess, intersitial lung disease, hypertension, hypoxia    Most recent EKG+ Tracin19, Jennifer    Most recent ECHO: N/A    Most recent Cardiac Stress Test: 19, Jennifer    Most recent Coronary Angiogram: N/A    Most recent PFT's: 10/29/18, Jennifer    Most recent Sleep Study:  N/A    Action 20 MV 1.43am   Action Taken 19 EKG strips requested from Jennifer

## 2020-01-21 PROBLEM — K80.20 CALCULUS OF GALLBLADDER WITHOUT CHOLECYSTITIS WITHOUT OBSTRUCTION: Status: ACTIVE | Noted: 2020-01-21

## 2020-01-21 PROBLEM — K13.70 ORAL LESION: Status: ACTIVE | Noted: 2017-02-14

## 2020-01-21 PROBLEM — K80.20 SYMPTOMATIC CHOLELITHIASIS: Status: ACTIVE | Noted: 2020-01-21

## 2020-01-23 ENCOUNTER — OFFICE VISIT (OUTPATIENT)
Dept: SURGERY | Facility: CLINIC | Age: 50
End: 2020-01-23
Payer: COMMERCIAL

## 2020-01-23 ENCOUNTER — PRE VISIT (OUTPATIENT)
Dept: SURGERY | Facility: CLINIC | Age: 50
End: 2020-01-23

## 2020-01-23 ENCOUNTER — ANESTHESIA EVENT (OUTPATIENT)
Dept: GASTROENTEROLOGY | Facility: CLINIC | Age: 50
End: 2020-01-23
Payer: COMMERCIAL

## 2020-01-23 VITALS
SYSTOLIC BLOOD PRESSURE: 114 MMHG | HEART RATE: 105 BPM | TEMPERATURE: 98.6 F | HEIGHT: 63 IN | OXYGEN SATURATION: 93 % | BODY MASS INDEX: 43.94 KG/M2 | RESPIRATION RATE: 16 BRPM | DIASTOLIC BLOOD PRESSURE: 81 MMHG | WEIGHT: 248 LBS

## 2020-01-23 DIAGNOSIS — Z01.818 PRE-OPERATIVE GENERAL PHYSICAL EXAMINATION: Primary | ICD-10-CM

## 2020-01-23 RX ORDER — EPINEPHRINE 0.3 MG/.3ML
0.3 INJECTION SUBCUTANEOUS PRN
COMMUNITY
Start: 2019-06-27 | End: 2023-12-22

## 2020-01-23 RX ORDER — BUSPIRONE HYDROCHLORIDE 30 MG/1
30 TABLET ORAL 2 TIMES DAILY
COMMUNITY

## 2020-01-23 RX ORDER — OMEPRAZOLE 40 MG/1
40 CAPSULE, DELAYED RELEASE ORAL PRN
COMMUNITY
End: 2021-07-15

## 2020-01-23 ASSESSMENT — PAIN SCALES - GENERAL: PAINLEVEL: MILD PAIN (3)

## 2020-01-23 ASSESSMENT — MIFFLIN-ST. JEOR: SCORE: 1724.05

## 2020-01-23 ASSESSMENT — LIFESTYLE VARIABLES: TOBACCO_USE: 1

## 2020-01-23 NOTE — ANESTHESIA PREPROCEDURE EVALUATION
Anesthesia Pre-Procedure Evaluation    Patient: Zayra Ramirez   MRN:     [id number] Gender:   female   Age:    49 year old :      1970        Preoperative Diagnosis: * No pre-op diagnosis entered *   Procedure(s):  ESOPHAGOGASTRODUODENOSCOPY (EGD)  COLONOSCOPY     Past Medical History:   Diagnosis Date     Allergic rhinitis      Anemia      Arthritis      Asthma     copd     Dental abscess      Depressive disorder      Gastroesophageal reflux disease      History of emphysema      Hoarseness      Hypertension      Morbid obesity with BMI of 40.0-44.9, adult (H)      Obstructive sleep apnea      Respiratory bronchiolitis interstitial lung disease (H)      Sleep apnea       Past Surgical History:   Procedure Laterality Date     CHOLECYSTECTOMY       COLONOSCOPY       ENT SURGERY       EXCISE LESION INTRAORAL Bilateral 10/3/2018    Procedure: EXCISE LESION INTRAORAL;  Wide Local Excision Of of Left Oral Cavity Ulcer;  Surgeon: Morro Mijares MD;  Location: [facility]     HC DRAIN SKIN ABSCESS SIMPLE/SINGLE  3/16/2012    Procedure:INCISION AND DRAINAGE, ABSCESS, SIMPLE; Surgeon:CHRISTIANO HANCOCK; Location: GI     HEAD & NECK SURGERY       HYSTERECTOMY       INCISION AND DRAINAGE ABDOMEN WASHOUT, COMBINED       LAMINECTOMY THORACIC ONE LEVEL N/A 2019    Procedure: LAMINECTOMY, SPINE, THORACIC, 11-12 and Part of Lumbar 1, DRAINAGE OF EPIDURAL ABCESS, Epidural Drain Placement X 2;  Surgeon: Yadiel Beal MD;  Location: [facility] OR     RADIO FREQUENCY ABLATION / DESTRUCTION OF SACROILOAC JOINT DORSAL PRIMARY RAMUS Bilateral 2019    Procedure: Bilateral lumbar radiofrequency ablation with fluoroscopy and intravenous sedation ( Lumbar 2,3,4,5 medial branch nerves for the bilateral lumbar3-4, 4-5 and 5-sacral1 joints.;  Surgeon: Tram Florian MD;  [facility]     spinal cord stimulator  2019     spinal cord stimulator removal  2019          Anesthesia Evaluation     . Pt  has had prior anesthetic. Type: General    No history of anesthetic complications          ROS/MED HX    ENT/Pulmonary: Comment: Mouth ulcers, interstitial lung disease on home oxygen. 3 L at night and 1-3 L as needed throughout the day with activity    CT chest 10/2018:  Ground glass pulmonary nodules represent respiratory bronchiolitis (RB) or RB-ILD.     (+)sleep apnea, tobacco use, Current use teenage years packs/day  asthma Treatment: Inhaler prn and Inhaler daily,  uses CPAP supplemental O2 cmH2O , . Other pulmonary disease Interstitial lung disease.    Neurologic: Comment: Right hand tremor recently began    (+)neuropathy - right thigh / low back pain, other neuro cervival spine stenosis C3-5 with myelopathy s/p surgery with improvement    Cardiovascular:     (+) hypertension----. : . . . :. . Previous cardiac testing date:results:Stress Testdate:~ 3 YRS AGO results:NO RECORD OF RESULTS date: results: date: results:          METS/Exercise Tolerance:  1 - Eating, dressing   Hematologic:  - neg hematologic  ROS       Musculoskeletal:  - neg musculoskeletal ROS (+)  other musculoskeletal- RHEUMATOID ARTHRITIS MANAGED ON METHOTREXATE      GI/Hepatic:     (+) GERD Asymptomatic on medication, Other GI/Hepatic ESOPJAGEAL STRICTURE      Renal/Genitourinary:  - ROS Renal section negative       Endo:     (+) Obesity, .      Psychiatric:     (+) psychiatric history other (comment) and depression (borderline personality/ ptsd)      Infectious Disease:  - neg infectious disease ROS       Malignancy:      - no malignancy   Other:    (+) No chance of pregnancy H/O Chronic Pain,no other significant disability                        PHYSICAL EXAM:   Mental Status/Neuro: A/A/O   Airway: Facies: Feasible  Mallampati: IV  Mouth/Opening: Limited  TM distance: > 6 cm  Neck ROM: Full   Respiratory: Auscultation: Decreased BS     Resp. Rate: Normal     Resp. Effort: Normal      CV: Rhythm: Regular  Heart: Normal Sounds  Edema: None  "  Comments:      Dental: Normal Dentition                LABS:  CBC:   Lab Results   Component Value Date    WBC 6.4 09/23/2019    WBC 6.5 09/16/2019    HGB 12.7 09/23/2019    HGB 12.9 09/16/2019    HCT 38.9 09/23/2019    HCT 39.1 09/16/2019     09/23/2019     09/16/2019     BMP:   Lab Results   Component Value Date     09/23/2019     09/16/2019    POTASSIUM 3.5 09/23/2019    POTASSIUM 3.3 (L) 09/16/2019    CHLORIDE 106 09/23/2019    CHLORIDE 104 09/16/2019    CO2 31 09/23/2019    CO2 30 09/16/2019    BUN 9 09/23/2019    BUN 11 09/16/2019    CR 0.57 09/23/2019    CR 0.59 09/16/2019    GLC 96 09/23/2019     (H) 09/16/2019     COAGS:   Lab Results   Component Value Date    PTT 37 08/19/2019    INR 1.09 08/18/2019    FIBR 842 (H) 08/18/2019     POC:   Lab Results   Component Value Date    BGM 90 10/03/2018    HCG Negative 11/28/2010     OTHER:   Lab Results   Component Value Date    LACT 2.6 (H) 04/04/2018    NATALIYA 8.8 09/23/2019    PHOS 3.0 09/23/2019    MAG 1.9 09/23/2019    ALBUMIN 3.5 09/23/2019    PROTTOTAL 6.6 (L) 09/23/2019    ALT 8 09/23/2019    AST 18 09/23/2019    ALKPHOS 86 09/23/2019    BILITOTAL 0.2 09/23/2019    CRP 15.4 (H) 09/23/2019    SED 15 09/23/2019        Preop Vitals    BP Readings from Last 3 Encounters:   01/08/20 (!) 144/99   12/17/19 (!) 149/86   12/05/19 134/82    Pulse Readings from Last 3 Encounters:   01/08/20 78   12/17/19 85   12/05/19 68      Resp Readings from Last 3 Encounters:   12/17/19 16   11/04/19 16   09/04/19 16    SpO2 Readings from Last 3 Encounters:   01/08/20 92%   12/17/19 95%   12/05/19 95%      Temp Readings from Last 1 Encounters:   01/08/20 98.9  F (37.2  C) (Oral)    Ht Readings from Last 1 Encounters:   01/08/20 1.626 m (5' 4\")      Wt Readings from Last 1 Encounters:   01/08/20 114 kg (251 lb 4.8 oz)    Estimated body mass index is 43.14 kg/m  as calculated from the following:    Height as of 1/8/20: 1.626 m (5' 4\").    Weight as of " 1/8/20: 114 kg (251 lb 4.8 oz).     LDA:  Peripheral IV 08/18/19 Right Upper forearm (Active)   Number of days: 158       Peripheral IV Right Hand (Active)   Number of days:        PICC Double Lumen 08/22/19 Right  (Active)   Number of days: 154       Closed/Suction Drain 1 Posterior Back Other (Comment)  (Active)   Number of days: 157       Closed/Suction Drain 2 Posterior Back Other (Comment)  (Active)   Number of days: 157        Assessment:   ASA SCORE: 3    H&P: History and physical reviewed and following examination; no interval change.         Plan:   Anes. Type:  MAC   Pre-Medication: None   Induction:  N/a   Airway: Native Airway   Access/Monitoring: PIV   Maintenance: N/a     Postop Plan:   Postop Pain: None  Postop Sedation/Airway: Not planned     PONV Management: Adult Risk Factors: Female     CONSENT: Direct conversation   Plan and risks discussed with: Patient   Blood Products: Consent Deferred (Minimal Blood Loss)                PAC Discussion and Assessment    ASA Classification: 3  Case is suitable for:   Anesthetic techniques and relevant risks discussed: MAC with GA as backup  Invasive monitoring and risk discussed: No  Types:   Possibility and Risk of blood transfusion discussed: No  NPO instructions given:   Additional anesthetic preparation and risks discussed:   Needs early admission to pre-op area:   Other:     PAC Resident/NP Anesthesia Assessment:  Zayra Ball is a 49 year old female for EGD under MAC, with Dr. Julian Sotomayor, on 2/6/20, in diagnosis of chronic abdominal pain. PAC referral for risk assessment and optimization for anesthesia with co morbid conditions of HTN, asthma, tobacco smoker, COPD, RLS, esophageal stricture, inflammatory arthritis.    Pre-operative considerations include:  1.) CV: Functional status independent. Exercise tolerance < 4 METS due to back pain and RA sx. No CP/jaw/arm pain.  HTN (Lisinopril-HCTZ), tobacco smoker, inflammatory arthritis. No known  "CAD.   EKG 5/2019 and Stress 4/2016 requested from Jennifer. Pt states normal findings  RCRI = 0 reflecting 0.4% risk of major adverse cardiac event  No further w/u for this low risk procedure. For a higher risk surgery consider stress test.  2.) Pulmonary:  Chronic BLANK. Asthma. COPD. Interstitial lung ds. Daily tobacco smoker. Pulmonary visit 11/2019 reflects...\" SOB, hypoxemia, obstructive ventilatory defect and abnormal CT CHEST most c/w bronchiolitis....no improvement with steroids. Managed with nebs 3x/day, Advair and albuterol. NO recetn exacerbation or visit to urgent care  10/2018 PFTs: moderate obstructive ventilatory defect with no significant reversibility. Lung volumes with air trapping. Diffusion capacity mildly reduced.   ROBERT on CPAP with supplemental O2 @ 3 lpm.  May be at risk for hypoxia.  3.) Heme: HGB 12 in Sept 2019. On folic acid.   VTE risk -no significant risk factors  4.) GI: Chronic abdominal pain. Hx esophageal stricture. On PPI which she will continue to DOS  Procedure as planned  PONV score = 1 (2 or > antiemetic prophylaxis recommended).  5.) MSK: Inflammatory arthritis on methotrexate. S/P cervical spine surgery for stenosis with myelopathy. S.P thoracic laminectomy. Spinal cord stimulator placed and removed. Chronic back pain on prn NSAIDS  Hold NSAIDS as per hospital GI protocol    Anesthesia: GA for laminectomy 8/2019 with no anesthesia complications       Reviewed and Signed by PAC Mid-Level Provider/Resident  Mid-Level Provider/Resident: Sumaya Bustillo PA-C  Date: 1/23/20  Time: 2:26 PM    Attending Anesthesiologist Anesthesia Assessment:        Anesthesiologist:   Date:   Time:   Pass/Fail:   Disposition:     PAC Pharmacist Assessment:        Pharmacist:   Date:   Time:    CYNTHIA Rubio  "

## 2020-01-23 NOTE — H&P
Pre-Operative H & P     CC:  Preoperative exam to assess for increased cardiopulmonary risk while undergoing surgery and anesthesia.    Date of Encounter: 1/23/2020  Primary Care Physician:  Adam Del Toro  Zayra Ramirez is a 49 year old female who presents for pre-operative H & P in preparation for EGD under MAC, with Dr. Julian Sotomayor, on 2/6/20, at [facility], in diagnosis of chronic abdominal pain. The abdominal pain is there since early November not associated with BMs. See GI note for details.    She has  co morbid conditions of HTN, asthma, tobacco smoker, COPD, RLS, esophageal stricture, inflammatory arthritis.         History is obtained from the patient.     Past Medical History  Past Medical History:   Diagnosis Date     Arthritis      Asthma     copd     Depressive disorder      Gastroesophageal reflux disease      iNTERSTITIAL LUNG DISEASE      Hypertension      Morbid obesity with BMI of 40.0-44.9, adult (H)      Obstructive sleep apnea      Respiratory bronchiolitis interstitial lung disease (H)        Past Surgical History  Past Surgical History:   Procedure Laterality Date     CHOLECYSTECTOMY       COLONOSCOPY       ENT SURGERY       EXCISE LESION INTRAORAL Bilateral 10/3/2018    Procedure: EXCISE LESION INTRAORAL;  Wide Local Excision Of of Left Oral Cavity Ulcer;  Surgeon: Morro Mijares MD;  Location: [facility] OR     HC DRAIN SKIN ABSCESS SIMPLE/SINGLE  3/16/2012    Procedure:INCISION AND DRAINAGE, ABSCESS, SIMPLE; Surgeon:CHRISTIANO HANCOCK; Location: GI     HEAD & NECK SURGERY       HYSTERECTOMY       INCISION AND DRAINAGE ABDOMEN WASHOUT, COMBINED       LAMINECTOMY THORACIC ONE LEVEL N/A 8/19/2019    Procedure: LAMINECTOMY, SPINE, THORACIC, 11-12 and Part of Lumbar 1, DRAINAGE OF EPIDURAL ABCESS, Epidural Drain Placement X 2;  Surgeon: Yadiel Beal MD;  [facility]     RADIO FREQUENCY ABLATION /  DESTRUCTION OF SACROILOAC JOINT DORSAL PRIMARY RAMUS Bilateral 12/17/2019    Procedure: Bilateral lumbar radiofrequency ablation with fluoroscopy and intravenous sedation ( Lumbar 2,3,4,5 medial branch nerves for the bilateral lumbar3-4, 4-5 and 5-sacral1 joints.;  Surgeon: Tram Florian MD;  [facility]     spinal cord stimulator  08/08/2019     spinal cord stimulator removal  08/13/2019       Hx of Blood transfusions/reactions: NO     Hx of abnormal bleeding or anti-platelet use: NO    Menstrual history: No LMP recorded (lmp unknown). Patient has had a hysterectomy.    Steroid use in the last year: YES FOR Rhematoid Arthritis COUPLE MONTHS AGO    Personal or FH with difficulty with Anesthesia:  NO    Prior to Admission Medications  Current Outpatient Medications   Medication Sig Dispense Refill     albuterol (PROAIR HFA, PROVENTIL HFA, VENTOLIN HFA) 108 (90 BASE) MCG/ACT inhaler Inhale 2 puffs into the lungs every 6 hours as needed for shortness of breath / dyspnea or wheezing (PT last dose 1.23.2020)        ARIPiprazole (ABILIFY) 15 MG tablet Take 15 mg by mouth At Bedtime        busPIRone HCl (BUSPAR) 30 MG tablet Take 15 mg by mouth At Bedtime       cholecalciferol ( ULTRA STRENGTH) 2000 units CAPS TAKE ONE CAPSULE BY MOUTH DAILY IN AM       cyclobenzaprine (FLEXERIL) 10 MG tablet Take 1 tablet (10 mg) by mouth 3 times daily (Patient taking differently: Take 10 mg by mouth At Bedtime ) 30 tablet 0     DULoxetine (CYMBALTA) 60 MG capsule Take 120 mg by mouth At Bedtime        EPINEPHrine (EPIPEN/ADRENACLICK/OR ANY BX GENERIC EQUIV) 0.3 MG/0.3ML injection 2-pack INJECT 0.3ML INTO THE MUSCLE ONCE AS NEEDED FOR ANAPHYLAXIS       ferrous sulfate (FEROSUL) 325 (65 Fe) MG tablet Take 325 mg by mouth At Bedtime   0     fluticasone-salmeterol (ADVAIR) 500-50 MCG/DOSE diskus inhaler Inhale 1 puff into the lungs 2 times daily        folic acid (FOLVITE) 1 MG tablet Take 1 tablet (1 mg) by mouth daily (Patient  taking differently: Take 1 mg by mouth every morning ) 90 tablet 3     lisinopril-hydrochlorothiazide (PRINZIDE/ZESTORETIC) 20-25 MG per tablet Take 1 tablet by mouth daily (Patient taking differently: Take 1 tablet by mouth every morning ) 30 tablet 0     methotrexate 2.5 MG tablet Take 9 tabs once weekly (Patient taking differently: every 7 days Take 9 tabs once weekly) 96 tablet 2     OXYGEN-HELIUM IN 2-3L PRN during the day, 3L @ night       rOPINIRole (REQUIP) 2 MG tablet Take 2 mg by mouth nightly as needed        vitamin C 500 MG TABS Take 500 mg by mouth daily (with lunch)        zinc sulfate (ZINCATE) 220 (50 Zn) MG capsule Take 220 mg by mouth daily (with lunch)        montelukast (SINGULAIR) 10 MG tablet Take 10 mg by mouth At Bedtime       omeprazole (PRILOSEC) 40 MG DR capsule Take 40 mg by mouth as needed (PT last dose appr 2 weeks ago)         Allergies  Allergies   Allergen Reactions     Bee Venom Anaphylaxis     Bees      Doxycycline Anaphylaxis     Patient thinks it may have been just nausea and vomiting, however unable to confirm     Erythromycin Anaphylaxis and Shortness Of Breath     Other reaction(s): Vomiting     Hydrocodone-Acetaminophen Itching       Social History  Social History     Socioeconomic History     Marital status: Single   Childre - 2 grown  Tobacco Use     Smoking status: Current Every Day Smoker     Packs/day: 1.00     Years: 30.00     Pack years: 30.00     Types: Cigarettes     Start date: 1/1/1996     Smokeless tobacco: Never Used   Substance and Sexual Activity     Alcohol use: No     Binge frequency: Monthly     Drug use: No     Sexual activity: Never     Other Topics Concern     Parent/sibling w/ CABG, MI or angioplasty before 65F 55M? No     Family History  Family History   Problem Relation Age of Onset     Other Cancer Father         base of tongue cancer at age ~65     Hypertension; MI after age 65 Father      Back Pain Father      Asthma; RA Mother      Anesthesia  "Evaluation  Pt has had prior anesthetic. Type: General    No history of anesthetic complications          ROS/MED HX    ENT/Pulmonary: Comment:  interstitial lung disease on home oxygen. 3 L at night   (+)sleep apnea, on CPAP  tobacco use, Current use teenage years   asthma Treatment: Inhaler prn and Inhaler daily,  uses CPAP supplemental O2 cmH2O  Other pulmonary disease Interstitial lung disease.    Neurologic: Comment: Right hand tremor recently began    (+)neuropathy - right thigh / low back pain, other neuro cervival spine stenosis C3-5 with myelopathy s/p surgery with improvement    Cardiovascular:     (+) hypertension  Previous cardiac testing Stress Testdate:~ 3 YRS AGO results:NO RECORD OF RESULTS        METS/Exercise Tolerance:  1 - Eating, dressing   Hematologic:  - neg hematologic  ROS       Musculoskeletal:  - neg musculoskeletal ROS (+)  other musculoskeletal- RHEUMATOID ARTHRITIS MANAGED ON METHOTREXATE      GI/Hepatic:     (+) GERD Asymptomatic on medication, Other GI/Hepatic ESOPJAGEAL STRICTURE      Renal/Genitourinary:  - ROS Renal section negative       Endo:     (+) Obesity, .      Psychiatric:     (+) psychiatric history other (comment) and depression (borderline personality/ ptsd)      Infectious Disease:  - neg infectious disease ROS       Malignancy:      - no malignancy   Other:    (+) No chance of pregnancy H/O Chronic Pain,no other significant disability            The complete review of systems is negative other than noted in the HPI or here.   Temp: 98.6  F (37  C) Temp src: Oral BP: 114/81 Pulse: 105   Resp: 16 SpO2: 93 %         248 lbs 0 oz  5' 3\"   Body mass index is 43.93 kg/m .       Physical Exam  Constitutional: Awake, alert, cooperative, no apparent distress, and appears stated age.  Eyes: Pupils equal, round and reactive to light, extra ocular muscles intact, sclera clear, conjunctiva normal.  HENT: Normocephalic, dry oral pharynx / tobacco stained dentition. No goiter " appreciated.   Respiratory: Quite distant BS with shallow effort/ clear to auscultation bilaterally, no crackles or wheezing.  Cardiovascular: Increased rate and normal rhythm, normal S1 and S2, and no murmur noted.  Carotids +2, no bruits. No LE edema. Palpable pulses to radial  arteries.   GI: Truncal obesity. Normal bowel sounds, soft, non-distended, non-tender, no masses palpated.  Lymph/Hematologic: No cervical lymphadenopathy and no supraclavicular lymphadenopathy.  Genitourinary:  deferred  Skin: Warm and dry.    Musculoskeletal: not quite full extension  ROM of neck. There is no redness, warmth, or swelling of the joints. Gross motor strength is not tested  Neurologic: Awake, alert, oriented to name, place and time. Cranial nerves II-XII are grossly intact. Gait is normal.   Neuropsychiatric: Calm, cooperative. Normal affect.     Labs: (personally reviewed)  Lab Results   Component Value Date    WBC 6.4 09/23/2019     Lab Results   Component Value Date    RBC 4.04 09/23/2019     Lab Results   Component Value Date    HGB 12.7 09/23/2019     Lab Results   Component Value Date    HCT 38.9 09/23/2019     Lab Results   Component Value Date    MCV 96 09/23/2019     Lab Results   Component Value Date    MCH 31.4 09/23/2019     Lab Results   Component Value Date    MCHC 32.6 09/23/2019     Lab Results   Component Value Date    RDW 13.6 09/23/2019     Lab Results   Component Value Date     09/23/2019     Last Comprehensive Metabolic Panel:  Sodium   Date Value Ref Range Status   09/23/2019 140 133 - 144 mmol/L Final     Potassium   Date Value Ref Range Status   09/23/2019 3.5 3.4 - 5.3 mmol/L Final     Chloride   Date Value Ref Range Status   09/23/2019 106 94 - 109 mmol/L Final     Carbon Dioxide   Date Value Ref Range Status   09/23/2019 31 20 - 32 mmol/L Final     Anion Gap   Date Value Ref Range Status   09/23/2019 3 3 - 14 mmol/L Final     Glucose   Date Value Ref Range Status   09/23/2019 96 70 - 99  "mg/dL Final     Urea Nitrogen   Date Value Ref Range Status   09/23/2019 9 7 - 30 mg/dL Final     Creatinine   Date Value Ref Range Status   09/23/2019 0.57 0.52 - 1.04 mg/dL Final     GFR Estimate   Date Value Ref Range Status   09/23/2019 >90 >60 mL/min/[1.73_m2] Final     Comment:     Non  GFR Calc  Starting 12/18/2018, serum creatinine based estimated GFR (eGFR) will be   calculated using the Chronic Kidney Disease Epidemiology Collaboration   (CKD-EPI) equation.       Calcium   Date Value Ref Range Status   09/23/2019 8.8 8.5 - 10.1 mg/dL Final     CT chest 10/2018:  Ground glass pulmonary nodules represent respiratory bronchiolitis (RB) or RB-ILD.   10/2018 PFTs: moderate obstructive ventilatory defect with no significant reversibility. Lung volumes with air trapping. Diffusion capacity mildly reduced.       ASSESSMENT and PLAN  Zayra Ramirez is a 49 year old female scheduled to undergo for EGD under MAC, with Dr. Julian Sotomayor, on 2/6/20, in diagnosis of chronic abdominal pain.       Pre-operative considerations include:  1.) CV: Functional status independent. Exercise tolerance < 4 METS. HTN (Lisinopril), tobacco smoker, inflammatory arthritis. No known CAD. No CP, leg edema, palpitations.  EKG 5/2019 and Stress 4/2016 requested from Jennifer.  RCRI = 0 reflecting 0.4% risk of major adverse cardiac event  No further w/u for this low risk procedure. For a higher risk surgery consider stress test.    2.) Pulmonary:  Chronic BLANK. Asthma. COPD. Daily tobacco smoker.   Pulmonary visit 11/2019 reflects...\" SOB, hypoxemia, obstructive ventilatory defect and abnormal CT CHEST most c/w bronchiolitis....no improvement with steroids. Managed with nebs 3x/day, Advair and albuterol.\" No recent exacerbation.   ROBERT on CPAP with supplemental O2 @ 3 lpm.  May be at risk for perioperative hypoxia    3.) Heme: Anemia -last HGB 12 in Sept 2019. On folic acid.   VTE risk -no significant risk " factors    4.) GI: Chronic abdominal pain. Hx esophageal stricture. On PPI which she will continue to DOS  Procedure as planned  PONV score = 1 (2 or > antiemetic prophylaxis recommended).    5.) MSK: Inflammatory arthritis on methotrexate. S/P cervical spine surgery for stenosis with myelopathy. S.P thoracic laminectomy. Spinal cord stimulator placed and removed. Chronic back pain on chronic opiates.   Anesthesia: GA for laminectomy 8/2019 with no anesthesia complications     Patient is optimized and is acceptable candidate for the proposed procedure.  No further diagnostic evaluation is needed.   CYNTHIA Rubio  Preoperative Assessment Center  [facility]  Phone: [phone]  Fax: [phone]

## 2020-01-29 ENCOUNTER — TELEPHONE (OUTPATIENT)
Dept: GASTROENTEROLOGY | Facility: CLINIC | Age: 50
End: 2020-01-29

## 2020-01-29 DIAGNOSIS — R10.12 ABDOMINAL PAIN, LEFT UPPER QUADRANT: Primary | ICD-10-CM

## 2020-01-29 NOTE — TELEPHONE ENCOUNTER
Patient Name: Zayra Ramirez   : 1970  MRN: [id number]       :  N/A    Carine Active    Additional Information regarding appointment:  _____________________________________________________      Patient scheduled for:  EGD/ Colonoscopy    Indication for procedure. Abdominal pain, left upper quadrant ; Screening    Sedation Type: MAC    Procedure Provider:  Fausto      Referring Provider. Waqas Del Toro (PCP)    Arrival time verified: .2020    Facility location verified:   [place] [address]  1st Floor, [address]    [x] N/A for this Payor ([place])    Pt meets medical necessity for outpatient procedure in hospital Endoscopy Unit: N/A      History & Physical:  [x] Complete, Date 2020 - trae - EPIC    Additional Information: ECG not complete /c H&P  __________________________________________________      Prep Type:   [x]Golytely  Pharmacy : [facility] [id number] [place] [address]   [x] NPO /p Golytely, No solid food /p 2200 the night before      Patient taking any blood thinners ? No      Electronic implanted medical devices ? No      GI / Hepatology History: Yes: See EPIC      Heart disease ? Yes: HTN      Lung disease ? Yes: See Epic, Home O2 2-3 L/Min @ noc      Sleep apnea ? Yes: CPAP      Diabetic ? No      Kidney disease ? No      Instructions given: [x] Rec'd & Read   [x] Reviewed         Pre procedure teaching completed: [x] Yes - Reviewed      IV Sedation: [x] No questions regarding Sedation as ordered       : Transportation from procedure & responsible adult to be with patient following procedure for a minimum of  24 hrs (MAC): [x] Dad - confirmed will have post-procedure companionship as required      Pt completed assessment via:   [x] Return/Follow-up telephone call    _______________________________________________    Michelle Patel RN  [facility]

## 2020-01-30 RX ORDER — BISACODYL 5 MG/1
10 TABLET, DELAYED RELEASE ORAL DAILY
Qty: 4 TABLET | Refills: 0 | Status: SHIPPED | OUTPATIENT
Start: 2020-01-30 | End: 2020-07-21

## 2020-02-05 RX ORDER — ONDANSETRON 2 MG/ML
4 INJECTION INTRAMUSCULAR; INTRAVENOUS
Status: CANCELLED | OUTPATIENT
Start: 2020-02-05

## 2020-02-05 RX ORDER — LIDOCAINE 40 MG/G
CREAM TOPICAL
Status: CANCELLED | OUTPATIENT
Start: 2020-02-05

## 2020-02-06 ENCOUNTER — HOSPITAL ENCOUNTER (OUTPATIENT)
Facility: CLINIC | Age: 50
Discharge: HOME | End: 2020-02-06
Attending: INTERNAL MEDICINE | Admitting: INTERNAL MEDICINE
Payer: COMMERCIAL

## 2020-02-06 ENCOUNTER — ANESTHESIA (OUTPATIENT)
Dept: GASTROENTEROLOGY | Facility: CLINIC | Age: 50
End: 2020-02-06
Payer: COMMERCIAL

## 2020-02-06 VITALS
TEMPERATURE: 98.8 F | HEART RATE: 85 BPM | RESPIRATION RATE: 29 BRPM | DIASTOLIC BLOOD PRESSURE: 94 MMHG | HEIGHT: 63 IN | WEIGHT: 248.24 LBS | OXYGEN SATURATION: 95 % | SYSTOLIC BLOOD PRESSURE: 151 MMHG | BODY MASS INDEX: 43.98 KG/M2

## 2020-02-06 LAB
COLONOSCOPY: NORMAL
UPPER GI ENDOSCOPY: NORMAL

## 2020-02-06 PROCEDURE — 45380 COLONOSCOPY AND BIOPSY: CPT | Performed by: INTERNAL MEDICINE

## 2020-02-06 PROCEDURE — 88305 TISSUE EXAM BY PATHOLOGIST: CPT | Performed by: INTERNAL MEDICINE

## 2020-02-06 PROCEDURE — 25800030 ZZH RX IP 258 OP 636: Performed by: NURSE ANESTHETIST, CERTIFIED REGISTERED

## 2020-02-06 PROCEDURE — 37000008 ZZH ANESTHESIA TECHNICAL FEE, 1ST 30 MIN: Performed by: INTERNAL MEDICINE

## 2020-02-06 PROCEDURE — 45385 COLONOSCOPY W/LESION REMOVAL: CPT | Mod: PT | Performed by: INTERNAL MEDICINE

## 2020-02-06 PROCEDURE — 25000125 ZZHC RX 250: Performed by: NURSE ANESTHETIST, CERTIFIED REGISTERED

## 2020-02-06 PROCEDURE — 25000128 H RX IP 250 OP 636: Performed by: NURSE ANESTHETIST, CERTIFIED REGISTERED

## 2020-02-06 PROCEDURE — 37000009 ZZH ANESTHESIA TECHNICAL FEE, EACH ADDTL 15 MIN: Performed by: INTERNAL MEDICINE

## 2020-02-06 PROCEDURE — 43235 EGD DIAGNOSTIC BRUSH WASH: CPT | Performed by: INTERNAL MEDICINE

## 2020-02-06 PROCEDURE — 40000556 ZZH STATISTIC PERIPHERAL IV START W US GUIDANCE

## 2020-02-06 RX ORDER — PROPOFOL 10 MG/ML
INJECTION, EMULSION INTRAVENOUS PRN
Status: DISCONTINUED | OUTPATIENT
Start: 2020-02-06 | End: 2020-02-06

## 2020-02-06 RX ORDER — ONDANSETRON 2 MG/ML
4 INJECTION INTRAMUSCULAR; INTRAVENOUS EVERY 6 HOURS PRN
Status: CANCELLED | OUTPATIENT
Start: 2020-02-06

## 2020-02-06 RX ORDER — LIDOCAINE HYDROCHLORIDE 20 MG/ML
INJECTION, SOLUTION INFILTRATION; PERINEURAL PRN
Status: DISCONTINUED | OUTPATIENT
Start: 2020-02-06 | End: 2020-02-06

## 2020-02-06 RX ORDER — GLYCOPYRROLATE 0.2 MG/ML
INJECTION, SOLUTION INTRAMUSCULAR; INTRAVENOUS PRN
Status: DISCONTINUED | OUTPATIENT
Start: 2020-02-06 | End: 2020-02-06

## 2020-02-06 RX ORDER — FENTANYL CITRATE 50 UG/ML
INJECTION, SOLUTION INTRAMUSCULAR; INTRAVENOUS PRN
Status: DISCONTINUED | OUTPATIENT
Start: 2020-02-06 | End: 2020-02-06

## 2020-02-06 RX ORDER — SODIUM CHLORIDE, SODIUM LACTATE, POTASSIUM CHLORIDE, CALCIUM CHLORIDE 600; 310; 30; 20 MG/100ML; MG/100ML; MG/100ML; MG/100ML
INJECTION, SOLUTION INTRAVENOUS CONTINUOUS PRN
Status: DISCONTINUED | OUTPATIENT
Start: 2020-02-06 | End: 2020-02-06

## 2020-02-06 RX ORDER — LIDOCAINE HYDROCHLORIDE 20 MG/ML
SOLUTION OROPHARYNGEAL PRN
Status: DISCONTINUED | OUTPATIENT
Start: 2020-02-06 | End: 2020-02-06

## 2020-02-06 RX ORDER — FLUMAZENIL 0.1 MG/ML
0.2 INJECTION, SOLUTION INTRAVENOUS
Status: CANCELLED | OUTPATIENT
Start: 2020-02-06 | End: 2020-02-07

## 2020-02-06 RX ORDER — ONDANSETRON 4 MG/1
4 TABLET, ORALLY DISINTEGRATING ORAL EVERY 6 HOURS PRN
Status: CANCELLED | OUTPATIENT
Start: 2020-02-06

## 2020-02-06 RX ORDER — PROPOFOL 10 MG/ML
INJECTION, EMULSION INTRAVENOUS CONTINUOUS PRN
Status: DISCONTINUED | OUTPATIENT
Start: 2020-02-06 | End: 2020-02-06

## 2020-02-06 RX ORDER — NALOXONE HYDROCHLORIDE 0.4 MG/ML
.1-.4 INJECTION, SOLUTION INTRAMUSCULAR; INTRAVENOUS; SUBCUTANEOUS
Status: CANCELLED | OUTPATIENT
Start: 2020-02-06 | End: 2020-02-07

## 2020-02-06 RX ADMIN — PROPOFOL 100 MCG/KG/MIN: 10 INJECTION, EMULSION INTRAVENOUS at 13:47

## 2020-02-06 RX ADMIN — FENTANYL CITRATE 25 MCG: 50 INJECTION, SOLUTION INTRAMUSCULAR; INTRAVENOUS at 13:56

## 2020-02-06 RX ADMIN — GLYCOPYRROLATE 0.1 MG: 0.2 INJECTION, SOLUTION INTRAMUSCULAR; INTRAVENOUS at 13:48

## 2020-02-06 RX ADMIN — LIDOCAINE HYDROCHLORIDE 10 MG: 20 INJECTION, SOLUTION INFILTRATION; PERINEURAL at 13:46

## 2020-02-06 RX ADMIN — PROPOFOL 30 MG: 10 INJECTION, EMULSION INTRAVENOUS at 13:54

## 2020-02-06 RX ADMIN — PROPOFOL 10 MG: 10 INJECTION, EMULSION INTRAVENOUS at 14:03

## 2020-02-06 RX ADMIN — PROPOFOL 20 MG: 10 INJECTION, EMULSION INTRAVENOUS at 14:12

## 2020-02-06 RX ADMIN — LIDOCAINE HYDROCHLORIDE 5 ML: 20 SOLUTION ORAL; TOPICAL at 13:46

## 2020-02-06 RX ADMIN — SODIUM CHLORIDE, POTASSIUM CHLORIDE, SODIUM LACTATE AND CALCIUM CHLORIDE: 600; 310; 30; 20 INJECTION, SOLUTION INTRAVENOUS at 13:43

## 2020-02-06 RX ADMIN — MIDAZOLAM 2 MG: 1 INJECTION INTRAMUSCULAR; INTRAVENOUS at 13:43

## 2020-02-06 ASSESSMENT — MIFFLIN-ST. JEOR: SCORE: 1720.13

## 2020-02-06 NOTE — OR NURSING
EGD with no interventions and Colonoscopy with polypectomy completed under MAC sedation. Pt tolerated procedure.

## 2020-02-06 NOTE — LETTER
February 10, 2020       TO: Zayra Ramirez  [address] [place] [address]       Dear Ms. Ramirez,    The pathology results returned from the polyp removed at your recent colonoscopy.    The polyp was pre-cancerous but showed no active evidence of cancer.      Due to the microscopic appearance of the polyp, you will require slightly closer monitoring in the future.    Current guidelines recommend that you undergo a follow-up colonoscopy in 5-10 years.    Sincerely,               Julian Lambert MD   [facility], [place], ENDOSCOPY  [address] [address]  Phone: [phone]

## 2020-02-06 NOTE — ANESTHESIA POSTPROCEDURE EVALUATION
Anesthesia POST Procedure Evaluation    Patient: Zayra Ramirez   MRN:     [id number] Gender:   female   Age:    50 year old :      1970        Preoperative Diagnosis: Abdominal pain, left upper quadrant [R10.12]  Special screening for malignant neoplasms, colon [Z12.11]   Procedure(s):  ESOPHAGOGASTRODUODENOSCOPY (EGD)  COLONOSCOPY, WITH POLYPECTOMY AND BIOPSY   Postop Comments: No value filed.       Anesthesia Type:  Not documented  MAC    Reportable Event: NO     PAIN: Uncomplicated   Sign Out status: Comfortable, Well controlled pain     PONV: No PONV   Sign Out status:  No Nausea or Vomiting     Neuro/Psych: Uneventful perioperative course   Sign Out Status: Preoperative baseline; Age appropriate mentation     Airway/Resp.: Uneventful perioperative course   Sign Out Status: Non labored breathing, age appropriate RR; Resp. Status within EXPECTED Parameters     CV: Uneventful perioperative course   Sign Out status: Appropriate BP and perfusion indices; Appropriate HR/Rhythm     Disposition:   Sign Out in:  PACU  Disposition:  Phase II; Home  Recovery Course: Uneventful  Follow-Up: Not required           Last Anesthesia Record Vitals:  CRNA VITALS  2020 1358 - 2020 1443      2020             Pulse:  106    Ht Rate:  105    SpO2:  100 %          Last PACU Vitals:  Vitals Value Taken Time   /96 2020  2:34 PM   Temp     Pulse 92 2020  2:34 PM   Resp 23 2020  2:34 PM   SpO2 100 % 2020  2:34 PM   Temp src     NIBP 166/109 2020  2:28 PM   Pulse 106 2020  2:29 PM   SpO2 100 % 2020  2:29 PM   Resp     Temp     Ht Rate 105 2020  2:29 PM   Temp 2     Vitals shown include unvalidated device data.      Electronically Signed By: Vitor Gross MD, 2020, 2:43 PM

## 2020-02-06 NOTE — ANESTHESIA CARE TRANSFER NOTE
Patient: Zayra Ramirez    Procedure(s):  ESOPHAGOGASTRODUODENOSCOPY (EGD)  COLONOSCOPY, WITH POLYPECTOMY AND BIOPSY    Diagnosis: Abdominal pain, left upper quadrant [R10.12]  Special screening for malignant neoplasms, colon [Z12.11]  Diagnosis Additional Information: No value filed.    Anesthesia Type:   MAC     Note:    Patient transferred to:Phase II  Comments: Anesthesia Care Transfer Note    Patient: Zayra Ramirez    Transferred to:[facility]    Patient vital signs: stable    Airway: none    Monitors on, breathing spontaneously, report to GREY Cool CRNA  2/6/2020 2:34 PM    Handoff Report: Identifed the Patient, Identified the Reponsible Provider, Reviewed the pertinent medical history, Discussed the surgical course, Reviewed Intra-OP anesthesia mangement and issues during anesthesia, Set expectations for post-procedure period and Allowed opportunity for questions and acknowledgement of understanding      Vitals: (Last set prior to Anesthesia Care Transfer)    CRNA VITALS  2/6/2020 1358 - 2/6/2020 1434      2/6/2020             Pulse:  106    Ht Rate:  105    SpO2:  100 %                Electronically Signed By: NICKO Velez CRNA  February 6, 2020  2:34 PM

## 2020-02-07 LAB — COPATH REPORT: NORMAL

## 2020-02-26 ENCOUNTER — ANCILLARY PROCEDURE (OUTPATIENT)
Dept: GENERAL RADIOLOGY | Facility: CLINIC | Age: 50
End: 2020-02-26
Attending: NURSE PRACTITIONER
Payer: COMMERCIAL

## 2020-02-26 ENCOUNTER — OFFICE VISIT (OUTPATIENT)
Dept: INTERNAL MEDICINE | Facility: CLINIC | Age: 50
End: 2020-02-26
Payer: COMMERCIAL

## 2020-02-26 VITALS — SYSTOLIC BLOOD PRESSURE: 96 MMHG | OXYGEN SATURATION: 95 % | HEART RATE: 77 BPM | DIASTOLIC BLOOD PRESSURE: 66 MMHG

## 2020-02-26 DIAGNOSIS — K03.81 CRACKED TOOTH: ICD-10-CM

## 2020-02-26 DIAGNOSIS — M47.816 LUMBAR SPONDYLOSIS: ICD-10-CM

## 2020-02-26 DIAGNOSIS — M25.512 CHRONIC LEFT SHOULDER PAIN: ICD-10-CM

## 2020-02-26 DIAGNOSIS — M54.6 CHRONIC BILATERAL THORACIC BACK PAIN: Primary | ICD-10-CM

## 2020-02-26 DIAGNOSIS — G89.29 CHRONIC LEFT SHOULDER PAIN: ICD-10-CM

## 2020-02-26 DIAGNOSIS — Z87.39 HISTORY OF SERONEGATIVE INFLAMMATORY ARTHRITIS: ICD-10-CM

## 2020-02-26 DIAGNOSIS — G89.29 CHRONIC BILATERAL THORACIC BACK PAIN: Primary | ICD-10-CM

## 2020-02-26 DIAGNOSIS — G06.1 SPINAL EPIDURAL ABSCESS: ICD-10-CM

## 2020-02-26 LAB
ALBUMIN SERPL-MCNC: 3.5 G/DL (ref 3.4–5)
ALP SERPL-CCNC: 85 U/L (ref 40–150)
ALT SERPL W P-5'-P-CCNC: 20 U/L (ref 0–50)
ANION GAP SERPL CALCULATED.3IONS-SCNC: 4 MMOL/L (ref 3–14)
AST SERPL W P-5'-P-CCNC: 13 U/L (ref 0–45)
BILIRUB SERPL-MCNC: 0.3 MG/DL (ref 0.2–1.3)
BUN SERPL-MCNC: 9 MG/DL (ref 7–30)
CALCIUM SERPL-MCNC: 9.2 MG/DL (ref 8.5–10.1)
CHLORIDE SERPL-SCNC: 101 MMOL/L (ref 94–109)
CO2 SERPL-SCNC: 33 MMOL/L (ref 20–32)
CREAT SERPL-MCNC: 0.81 MG/DL (ref 0.52–1.04)
CRP SERPL-MCNC: 15.1 MG/L (ref 0–8)
ERYTHROCYTE [DISTWIDTH] IN BLOOD BY AUTOMATED COUNT: 14.5 % (ref 10–15)
ERYTHROCYTE [SEDIMENTATION RATE] IN BLOOD BY WESTERGREN METHOD: 14 MM/H (ref 0–20)
GFR SERPL CREATININE-BSD FRML MDRD: 85 ML/MIN/{1.73_M2}
GLUCOSE SERPL-MCNC: 89 MG/DL (ref 70–99)
HCT VFR BLD AUTO: 43.4 % (ref 35–47)
HGB BLD-MCNC: 14.1 G/DL (ref 11.7–15.7)
MCH RBC QN AUTO: 31.1 PG (ref 26.5–33)
MCHC RBC AUTO-ENTMCNC: 32.5 G/DL (ref 31.5–36.5)
MCV RBC AUTO: 96 FL (ref 78–100)
PLATELET # BLD AUTO: 304 10E9/L (ref 150–450)
POTASSIUM SERPL-SCNC: 3.7 MMOL/L (ref 3.4–5.3)
PROT SERPL-MCNC: 7 G/DL (ref 6.8–8.8)
RBC # BLD AUTO: 4.53 10E12/L (ref 3.8–5.2)
SODIUM SERPL-SCNC: 138 MMOL/L (ref 133–144)
WBC # BLD AUTO: 8.5 10E9/L (ref 4–11)

## 2020-02-26 RX ORDER — OXYCODONE AND ACETAMINOPHEN 5; 325 MG/1; MG/1
1 TABLET ORAL EVERY 8 HOURS PRN
Qty: 15 TABLET | Refills: 0 | Status: SHIPPED | OUTPATIENT
Start: 2020-02-26 | End: 2020-03-20

## 2020-02-26 ASSESSMENT — ASTHMA QUESTIONNAIRES
QUESTION_2 LAST FOUR WEEKS HOW OFTEN HAVE YOU HAD SHORTNESS OF BREATH: THREE TO SIX TIMES A WEEK
QUESTION_4 LAST FOUR WEEKS HOW OFTEN HAVE YOU USED YOUR RESCUE INHALER OR NEBULIZER MEDICATION (SUCH AS ALBUTEROL): ONE OR TWO TIMES PER DAY
QUESTION_5 LAST FOUR WEEKS HOW WOULD YOU RATE YOUR ASTHMA CONTROL: SOMEWHAT CONTROLLED
ACT_TOTALSCORE: 13
QUESTION_3 LAST FOUR WEEKS HOW OFTEN DID YOUR ASTHMA SYMPTOMS (WHEEZING, COUGHING, SHORTNESS OF BREATH, CHEST TIGHTNESS OR PAIN) WAKE YOU UP AT NIGHT OR EARLIER THAN USUAL IN THE MORNING: TWO OR THREE NIGHTS A WEEK
QUESTION_1 LAST FOUR WEEKS HOW MUCH OF THE TIME DID YOUR ASTHMA KEEP YOU FROM GETTING AS MUCH DONE AT WORK, SCHOOL OR AT HOME: SOME OF THE TIME

## 2020-02-26 ASSESSMENT — PAIN SCALES - GENERAL: PAINLEVEL: MODERATE PAIN (5)

## 2020-02-26 NOTE — NURSING NOTE
Chief Complaint   Patient presents with     Pain     pt here for shoulder, thigh, and low back pain, worse in last month     Dental Problem     pt here to talk about getting a tooth pulled       Audie Vergara CMA, EMT at 12:39 PM on 2/26/2020.

## 2020-02-26 NOTE — PROGRESS NOTES
[facility]  Primary Care Center   Dulce ANANDA Cortez, NICKO ALEXIS  02/26/2020      Chief Complaint:   Pain and Dental Problem       History of Present Illness:   Zayra Ramirez is a 50 year old female with a history of hypertension, chronic low back pain, lumbar spondylosis, and cervical spinal stenosis who presents with her dad, Jordan, for follow up of back and shoulder pain.    Back and shoulder pain: She has worsening back and left shoulder pain. Her shoulder pain is thought to be due to rheumatoid arthritis. She does not sleep well at night due to the pain, and this causes reduced function the next day. She used to be able to stand for short periods of time to do dishes with intermittent sitting, but this is not working anymore to prevent pain. She is interested in an injection. She has had them in the past in her shoulder, which make the pain much more tolerable. She is still on Methotrexate, which she does not think is helping very much. She has had radiofrequency ablations for her back pain about twice per year for the past 4-5 years, most recently in Dec 2019 with Dr. Florian, so she is not due for another session soon. She has been told that she is not a candidate for surgery and she most likely will have to manage with her pain through medication. She was in physical therapy, which helped with her mid-back pain. She continues to use these exercises. Her right upper thigh is numb, but when something touches it she has intense, sharp, burning pain. She has used Gabapentin in the past for anxiety/borderline personality disorder, but this did not help her (psychiatrist is Dr. Yip with [facility])--feels mood is finally well controlled with Abilify and Duloxetine, so she is hesitant to restart gabapentin. She was taking Naproxen for a few weeks but stopped, and did not notice a big difference.     Cracked tooth: She lost her mouthguard for one month and cracked her right upper tooth. She has an appointment in [place] on  3/02 to get the tooth pulled. 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    Other concerns discussed:  1. She follows with Dr. Bonilla in pulmonology at [facility] and uses her inhalers every day.      Review of Systems:   Pertinent items are noted in HPI or as in patient entered ROS below, remainder of complete ROS is negative.     Active Medications:     Current Outpatient Medications:      albuterol (PROAIR HFA, PROVENTIL HFA, VENTOLIN HFA) 108 (90 BASE) MCG/ACT inhaler, Inhale 2 puffs into the lungs every 6 hours as needed for shortness of breath / dyspnea or wheezing (PT last dose 1.23.2020) , Disp: , Rfl:      ARIPiprazole (ABILIFY) 15 MG tablet, Take 15 mg by mouth At Bedtime , Disp: , Rfl:      busPIRone HCl (BUSPAR) 30 MG tablet, Take 15 mg by mouth At Bedtime, Disp: , Rfl:      cholecalciferol ( ULTRA STRENGTH) 2000 units CAPS, TAKE ONE CAPSULE BY MOUTH DAILY IN AM, Disp: , Rfl:      cyclobenzaprine (FLEXERIL) 10 MG tablet, Take 1 tablet (10 mg) by mouth 3 times daily (Patient taking differently: Take 10 mg by mouth At Bedtime ), Disp: 30 tablet, Rfl: 0     DULoxetine (CYMBALTA) 60 MG capsule, Take 120 mg by mouth At Bedtime , Disp: , Rfl:      EPINEPHrine (EPIPEN/ADRENACLICK/OR ANY BX GENERIC EQUIV) 0.3 MG/0.3ML injection 2-pack, INJECT 0.3ML INTO THE MUSCLE ONCE AS NEEDED FOR ANAPHYLAXIS, Disp: , Rfl:      ferrous sulfate (FEROSUL) 325 (65 Fe) MG tablet, Take 325 mg by mouth At Bedtime , Disp: , Rfl: 0     fluticasone-salmeterol (ADVAIR) 500-50 MCG/DOSE diskus inhaler, Inhale 1 puff into the lungs 2 times daily , Disp: , Rfl:      folic acid (FOLVITE) 1 MG tablet, Take 1 tablet (1 mg) by mouth daily (Patient taking differently: Take 1 mg by mouth every morning ), Disp: 90 tablet, Rfl: 3     lisinopril-hydrochlorothiazide (PRINZIDE/ZESTORETIC) 20-25 MG per tablet, Take 1 tablet by mouth daily (Patient taking differently: Take  1 tablet by mouth every morning ), Disp: 30 tablet, Rfl: 0     methotrexate 2.5 MG tablet, Take 9 tabs once weekly (Patient taking differently: every 7 days Take 9 tabs once weekly), Disp: 96 tablet, Rfl: 2     montelukast (SINGULAIR) 10 MG tablet, Take 10 mg by mouth At Bedtime, Disp: , Rfl:      omeprazole (PRILOSEC) 40 MG DR capsule, Take 40 mg by mouth as needed (PT last dose appr 2 weeks ago), Disp: , Rfl:      oxyCODONE-acetaminophen (PERCOCET) 5-325 MG tablet, Take 1 tablet by mouth every 8 hours as needed for severe pain, Disp: 15 tablet, Rfl: 0     OXYGEN-HELIUM IN, 2-3L PRN during the day, 3L @ night, Disp: , Rfl:      rOPINIRole (REQUIP) 2 MG tablet, Take 2 mg by mouth nightly as needed , Disp: , Rfl:      vitamin C 500 MG TABS, Take 500 mg by mouth daily (with lunch) , Disp: , Rfl:      zinc sulfate (ZINCATE) 220 (50 Zn) MG capsule, Take 220 mg by mouth daily (with lunch) , Disp: , Rfl:   No current facility-administered medications for this visit.     Facility-Administered Medications Ordered in Other Visits:      Lidocaine 1 % injection 0.5-5 mL, 0.5-5 mL, Other, Once PRN, Gutierrez Candelario MD     sodium chloride (PF) 0.9% PF flush 5-50 mL, 5-50 mL, Intracatheter, Once PRN, Gutierrez Candelario MD      Allergies:   Bee venom; Bees; Doxycycline; Erythromycin; and Hydrocodone-acetaminophen      Past Medical History:  Allergic rhinitis  Anemia  Arthritis   Asthma  Dental abscess  Depressive disorder  GERD  Subcutaneous emphysema  Hoarseness  Hypertension  Obstructive sleep apnea  Respiratory bronchiolitis interstitial lung disease  Chronic midline low back pain  Facial cellulitis  Hypoxia  Borderline personality disorder  Stenosis of cervical spine with myelopathy  Esophageal stricture  Onychomycosis  Restless legs syndrome  Seasonal allergies  Spinal epidural abscess  Lumbar spondylosis  Inflammatory arthritis  Chronic bilateral thoracic back pain  Arthralgia of temporomandibular joint  Articular disc disorder  of temporomandibular joint  Derangement of temporomandibular joint  Calculus of gallbladder without cholecystitis without obstruction  Myofascial pain     Past Surgical History:  Cholecystectomy  Colonoscopy x2: last 2/6/2020  ENT surgery  EGD, combined: 10/2018  Excise lesion intraoral: 10/2018  Drain skin abscess: 3/2012  Hysterectomy  Incision and drainage abdomen washout, combined  Laminectomy thoracic one level: 8/2019  Radio frequency ablation/destruction of sacroiliac joint dorsal primary ramus: 12/2019  Spinal cord stimulator: 8/2019  Spinal cord stimulator removal: 8/2019    Family History:   Tongue cancer: father  Hypertension: father  Back pain: father  Asthma: father       Social History:   Smoking status: current every day smoker, 1 ppd, 30 pack years  Alcohol use: yes  Drug use: marijuana rarely      Physical Exam:   BP 96/66 (BP Location: Right arm, Patient Position: Sitting, Cuff Size: Adult Large)   Pulse 77   LMP  (LMP Unknown)   SpO2 95%    Constitutional: Alert, oriented, pleasant, no acute distress  Head: Normocephalic, atraumatic  Eyes: Extra-ocular movements intact, pupils equally round and reactive bilaterally, no scleral icterus  Ears: tympanic membranes pearly gray with positive light reflex  ENT: Oropharynx clear, moist mucus membranes, fair dentition  Neck: Supple, no lymphadenopathy, no thyromegaly  Cardiovascular: Regular rate and rhythm, no murmurs, rubs or gallops  Respiratory: Good air movement bilaterally, lungs clear, no wheezes/rales/rhonchi  Musculoskeletal: No edema, normal muscle tone, normal gait, left shoulder tender to palpation over AC joint, pain with flexion greater than 120 degrees and abduction greater than 110 degrees, strength 5/5, negative internal/external rotation, negative empty can test  Back exam:    Inspection: No rashes, bruising, scars  Palpation:  No muscle spasm or scoliosis.  Non-tender spine, sacroiliac joints, sciatic notches and trochanters  ROM:   Flexion--can reach hands to distal tibia.  Extension, side bending intact.  Pain elicited with ROM testing  Difficulty standing on heels and toes  Gait is steady without assistance  Strength: 5/5 hip flexion, abduction, adduction, knee flex/ext, foot flex/ext  Sensation:  Grossly intact to light touch, diminished sensation over lateral right thigh along L3-L4 dermatome  DTRs:  2+ patella  Straight leg raise: unable to perform due to pain  Neurologic: Alert and oriented, cranial nerves grossly intact, no focal deficits  Psychiatric: normal mentation, affect and mood      Assessment and Plan:  Chronic bilateral thoracic back pain, Lumbar spondylosis  She has been doing many treatments for her back pain, including a spinal stimulator (complicated by T11-L1 epidural abcess), steroid injections, physical therapy, and radiofrequency ablation with only temporary relief of her pain. Would consider restarting gabapentin for nerve pain, but she should discuss this with her psychiatrist at their next visit in March to make sure that it does not interact with her current psych medication regimen.  Advised that she continue to follow with the pain clinic. In the meantime, she can meet with the spine specialists to see if there are any other additional options for treatment. [place]  reviewed, gave Percocet for severe breakthrough pain, use sparingly.  - Orthopedic & Spine  Referral  - oxyCODONE-acetaminophen (PERCOCET) 5-325 MG tablet  Dispense: 15 tablet; Refill: 0    Cracked tooth  She has a cracked tooth and requests a referral to the dental clinic.   - DENTAL REFERRAL  - CBC with platelets    Chronic left shoulder pain  She has severe, chronic left shoulder pain. Ordered shoulder x-ray to see how the joint space is looking. She follows with rheumatology in 5/2019. Will check her inflammatory markers. If these are stable, she can talk to orthopedics about another injection in her shoulder. If inflammatory markers are  acutely elevated, would start a course of steroid treatment under rheumatology's guidance; these are stable compared to her last levels drawn in Sept 2019. Referral to sports medicine provided.  - CRP inflammation  - Basic metabolic panel  - XR Shoulder Left 2 Views  - CBC with platelets     Follow-up: as needed     Scribe Disclosure:  I, Kimi Covington, am serving as a scribe to document services personally performed by NICKO Griffith CNP at this visit, based upon the provider's statements to me. All documentation has been reviewed by the aforementioned provider prior to being entered into the official medical record.     Portions of this medical record were completed by a scribe. UPON MY REVIEW AND AUTHENTICATION BY ELECTRONIC SIGNATURE, this confirms (a) I performed the applicable clinical services, and (b) the record is accurate.     NICKO Griffith CNP

## 2020-02-26 NOTE — PATIENT INSTRUCTIONS
[facility] Medication Refill Request Information:  * Please contact your pharmacy regarding ANY request for medication refills.  ** [facility] Prescription Fax = [phone]  * Please allow 3 business days for routine medication refills.  * Please allow 5 business days for controlled substance medication refills.     [facility] Test Result notification information:  *You will be notified with in 7-10 days of your appointment day regarding the results of your test.  If you are on MyChart you will be notified as soon as the provider has reviewed the results and signed off on them.    [facility]: [phone]     Ask Dr. Milligan about his thoughts on re-starting gabapentin for your nerve pain.

## 2020-02-27 ASSESSMENT — ASTHMA QUESTIONNAIRES: ACT_TOTALSCORE: 13

## 2020-02-27 NOTE — TELEPHONE ENCOUNTER
DIAGNOSIS: Chronic left shoulder pain, xray done 02/26   APPOINTMENT DATE: 3/17   NOTES STATUS DETAILS   OFFICE NOTE from referring provider N/A  Dulce Cortez APRN CNP    OFFICE NOTE from other specialist [facility],  - 11/02/2018      DISCHARGE SUMMARY from hospital N/A    DISCHARGE REPORT from the ER N/A    OPERATIVE REPORT N/A    MEDICATION LIST Internal    MRI N/A    CT SCAN n/a    XRAYS (IMAGES & REPORTS) Internal/recieved  judith 11/2/18 2/26/20

## 2020-02-28 ENCOUNTER — OFFICE VISIT (OUTPATIENT)
Dept: PALLIATIVE MEDICINE | Facility: CLINIC | Age: 50
End: 2020-02-28
Payer: COMMERCIAL

## 2020-02-28 VITALS — HEART RATE: 92 BPM | DIASTOLIC BLOOD PRESSURE: 81 MMHG | OXYGEN SATURATION: 95 % | SYSTOLIC BLOOD PRESSURE: 119 MMHG

## 2020-02-28 DIAGNOSIS — G89.29 CHRONIC MIDLINE LOW BACK PAIN, UNSPECIFIED WHETHER SCIATICA PRESENT: Primary | ICD-10-CM

## 2020-02-28 DIAGNOSIS — M79.18 MYOFASCIAL PAIN: ICD-10-CM

## 2020-02-28 DIAGNOSIS — Z87.39 HISTORY OF SERONEGATIVE INFLAMMATORY ARTHRITIS: ICD-10-CM

## 2020-02-28 DIAGNOSIS — M54.50 CHRONIC MIDLINE LOW BACK PAIN, UNSPECIFIED WHETHER SCIATICA PRESENT: Primary | ICD-10-CM

## 2020-02-28 PROCEDURE — 99203 OFFICE O/P NEW LOW 30 MIN: CPT | Performed by: PHYSICAL MEDICINE & REHABILITATION

## 2020-02-28 ASSESSMENT — PAIN SCALES - GENERAL: PAINLEVEL: SEVERE PAIN (6)

## 2020-02-28 NOTE — PROGRESS NOTES
[facility] Medical Spine Consultation    Date of visit: 2/28/2020    Primary Care Provider: Dr. Adam Del Toro    Reason for consultation:    Zayra Ramirez is a 50 year old female who is seen in consultation today at the request of her primary care physician, Adam Del Toro.    Consultation and Evaluation for: Medical spine evaluation    Review of [place] Prescription Monitoring Program (): Today I have also reviewed the patient's history of controlled substance use, as provided by [place] licensed pharmacies and prescriber dispensers.     Review of Pain Questionnaire: Please see the [facility] health questionnaire, which the patient completed and reviewed with me in detail.    Review of Electronic Chart: Today I have also reviewed available medical information in the patient's medical record at [place] ([facility]), including relevant provider notes, laboratory work, and imaging.     Chief Complaint:    Back pain    Pain history:  Zayra Ramirez is a 50 year old female with a PMH significant for interstitial lung disease, asthma, obesity, GERD, HTN, RLS, RA who presents to clinic today for recommendations regarding ongoing back pain. She has had chronic low back pain for many years. She is s/p spinal cord stimulator trial on 8/8/19 with removal on 8/13/2019 due to increased back pain. Found to have a T11-L1 epidural abscess. Had a T12-L2 laminectomy with evacuation of the abscess. She was treated with IV antibiotics for 6 weeks.      Today she reports that she has been dealing with back pain for about 30 years without any particular inciting event. She has some radiation into the anterolateral thigh. Pain is constant. Aggravated with prolonged sitting or walking. Relieved with nothing. Maybe some mild improvement with stretching. Describes pain as a constant ache but then when it is aggravated feels more throbbing and shooting in quality. It has been gradually worsening  over time.     She is being seen at the [facility] comprehensive pain clinic. She recently had a bilateral lumbar RFA on 12/17/2020. This has provided some benefit but she is still having more pain by the end of the day. She has not followed up with the pain clinic yet. Previously she has had multiple RFAs with significant relief as well as multiple epidural steroid injections. These have been done at various facilities including [facility] and [facility].     Denies red flags including: bowel or bladder symptoms, fever, chills, saddle anesthesia, profound motor loss, history of cancer, history of immune compromise, weight loss.    Current medication treatments include:  Percocet 5-325 mg q 8 hrs prn - H  Flexeril 10 mg TID prn - only taking for TMJ once per day.   Cymbalta 120 mg q hs - H    Pain medications are being prescribed by primary care.    Previous medication treatments included:  Gabapentin - [facility]  Tylenol - [facility]  Ibuprofen - [facility]  Aleve - NH  Lidocaine patches - [facility]    Other treatments have included:  Behavioral interventions: Has done some pain psychology - [facility]  PT: Yes - [facility], still doing HEP. Pool therapy - [facility]  Manual Medicine: Yes - in the past - H  Surgeries: hx of cervical surgery   Acupuncture: None   TENs Unit: Yes - [facility]  Injections: multiple as stated above. Helped her be a little more active.   Other: ice packs- [facility], heating pads - [facility]      Past Medical History:  Past Medical History:   Diagnosis Date     Allergic rhinitis      Anemia      Arthritis      Asthma     copd     Dental abscess 8-2015     Depressive disorder      Gastroesophageal reflux disease      History of emphysema      Hoarseness      Hypertension      Morbid obesity with BMI of 40.0-44.9, adult (H)      Obstructive sleep apnea      Respiratory bronchiolitis interstitial lung disease (H)      Sleep apnea      Past Surgical History:  Past Surgical History:   Procedure Laterality Date     CHOLECYSTECTOMY       COLONOSCOPY       COLONOSCOPY N/A  2/6/2020    Procedure: COLONOSCOPY, WITH POLYPECTOMY AND BIOPSY;  Surgeon: Julian Mccullough MD;  Location:  GI     ENT SURGERY       ESOPHAGOSCOPY, GASTROSCOPY, DUODENOSCOPY (EGD), COMBINED N/A 2/6/2020    Procedure: ESOPHAGOGASTRODUODENOSCOPY (EGD);  Surgeon: Julian Mccullough MD;  Location:  GI     EXCISE LESION INTRAORAL Bilateral 10/3/2018    Procedure: EXCISE LESION INTRAORAL;  Wide Local Excision Of of Left Oral Cavity Ulcer;  Surgeon: Morro Mijares MD;  Location: [facility] OR     HC DRAIN SKIN ABSCESS SIMPLE/SINGLE  3/16/2012    Procedure:INCISION AND DRAINAGE, ABSCESS, SIMPLE; Surgeon:CHRISTIANO HANCOCK; Location: GI     HEAD & NECK SURGERY       HYSTERECTOMY       INCISION AND DRAINAGE ABDOMEN WASHOUT, COMBINED       LAMINECTOMY THORACIC ONE LEVEL N/A 8/19/2019    Procedure: LAMINECTOMY, SPINE, THORACIC, 11-12 and Part of Lumbar 1, DRAINAGE OF EPIDURAL ABCESS, Epidural Drain Placement X 2;  Surgeon: Yadiel Beal MD;  Location: [facility] OR     RADIO FREQUENCY ABLATION / DESTRUCTION OF SACROILOAC JOINT DORSAL PRIMARY RAMUS Bilateral 12/17/2019    Procedure: Bilateral lumbar radiofrequency ablation with fluoroscopy and intravenous sedation ( Lumbar 2,3,4,5 medial branch nerves for the bilateral lumbar3-4, 4-5 and 5-sacral1 joints.;  Surgeon: Tram Florian MD;  Location:  OR     spinal cord stimulator  08/08/2019     spinal cord stimulator removal  08/13/2019     Medications:  Current Outpatient Medications   Medication Sig Dispense Refill     albuterol (PROAIR HFA, PROVENTIL HFA, VENTOLIN HFA) 108 (90 BASE) MCG/ACT inhaler Inhale 2 puffs into the lungs every 6 hours as needed for shortness of breath / dyspnea or wheezing (PT last dose 1.23.2020)        ARIPiprazole (ABILIFY) 15 MG tablet Take 15 mg by mouth At Bedtime        busPIRone HCl (BUSPAR) 30 MG tablet Take 15 mg by mouth At Bedtime       cholecalciferol ( ULTRA STRENGTH) 2000 units CAPS TAKE ONE CAPSULE BY MOUTH  DAILY IN AM       cyclobenzaprine (FLEXERIL) 10 MG tablet Take 1 tablet (10 mg) by mouth 3 times daily (Patient taking differently: Take 10 mg by mouth At Bedtime ) 30 tablet 0     DULoxetine (CYMBALTA) 60 MG capsule Take 120 mg by mouth At Bedtime        EPINEPHrine (EPIPEN/ADRENACLICK/OR ANY BX GENERIC EQUIV) 0.3 MG/0.3ML injection 2-pack INJECT 0.3ML INTO THE MUSCLE ONCE AS NEEDED FOR ANAPHYLAXIS       ferrous sulfate (FEROSUL) 325 (65 Fe) MG tablet Take 325 mg by mouth At Bedtime   0     fluticasone-salmeterol (ADVAIR) 500-50 MCG/DOSE diskus inhaler Inhale 1 puff into the lungs 2 times daily        folic acid (FOLVITE) 1 MG tablet Take 1 tablet (1 mg) by mouth daily (Patient taking differently: Take 1 mg by mouth every morning ) 90 tablet 3     lisinopril-hydrochlorothiazide (PRINZIDE/ZESTORETIC) 20-25 MG per tablet Take 1 tablet by mouth daily (Patient taking differently: Take 1 tablet by mouth every morning ) 30 tablet 0     methotrexate 2.5 MG tablet Take 9 tabs once weekly (Patient taking differently: every 7 days Take 9 tabs once weekly) 96 tablet 2     montelukast (SINGULAIR) 10 MG tablet Take 10 mg by mouth At Bedtime       omeprazole (PRILOSEC) 40 MG DR capsule Take 40 mg by mouth as needed (PT last dose appr 2 weeks ago)       oxyCODONE-acetaminophen (PERCOCET) 5-325 MG tablet Take 1 tablet by mouth every 8 hours as needed for severe pain 15 tablet 0     OXYGEN-HELIUM IN 2-3L PRN during the day, 3L @ night       rOPINIRole (REQUIP) 2 MG tablet Take 2 mg by mouth nightly as needed        vitamin C 500 MG TABS Take 500 mg by mouth daily (with lunch)        zinc sulfate (ZINCATE) 220 (50 Zn) MG capsule Take 220 mg by mouth daily (with lunch)        Allergies:     Allergies   Allergen Reactions     Bee Venom Anaphylaxis     Bees      Doxycycline Anaphylaxis     Patient thinks it may have been just nausea and vomiting, however unable to confirm     Erythromycin Anaphylaxis and Shortness Of Breath     Other  reaction(s): Vomiting     Hydrocodone-Acetaminophen Itching     Social History:  Home situation: Lives in [place], [place].   Occupation/Schooling: Not working   Tobacco use: yes  Alcohol use: none  Drug use: none    Family history:  Family History   Problem Relation Age of Onset     Other Cancer Father         base of tongue cancer at age ~65     Hypertension Father      Back Pain Father      Asthma Mother      Diagnostic Tests:  Lumbar MRI completed on 9/24/2019 showed:  FINDINGS: The report is dictated assuming five lumbar-type vertebral  bodies, and radiographic correlation may be necessary.      The distal spinal cord and cauda equina appear normal.  No abnormal  gadolinium enhancement is seen in the thecal sac or spinal cord. The  lower thoracic and upper lumbar epidural abscess has been completely  evacuated and there is no evidence of residual fluid collection.  Postoperative changes from bilateral laminectomy are present at T12-L2  with gadolinium enhancing tissue in the surgical bed consistent with  granulation tissue.     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 Spinal canal stenoses at L2-L3 and  L3-L4 are unchanged.                                                                      IMPRESSION: No evidence of residual epidural abscess status post  evacuation of the T12-L2 laminectomy.    Review of Systems:    POSTIVE IN BOLD  GENERAL: fever/chills, fatigue, general unwell feeling, weight gain/loss.  HEAD/EYES:  headache, dizziness, or vision changes.    EARS/NOSE/THROAT:  Nosebleeds, hearing loss, sinus infection, earache, tinnitus.  IMMUNE:  Allergies, cancer, immune deficiency, or infections.  SKIN:  Urticaria, rash, hives  HEME/Lymphatic:   anemia, easy bruising, easy bleeding.  RESPIRATORY:  cough, wheezing, or shortness of breath  CARDIOVASCULAR/Circulation:  Extremity  edema, syncope, hypertension, tachycardia, or angina.  GASTROINTESTINAL:  abdominal pain, nausea/emesis, diarrhea, constipation,  hematochezia, or melena.  ENDOCRINE:  Diabetes, steroid use,  thyroid disease or osteoporosis.  MUSCULOSKELETAL: neck pain, back pain, arthralgia, arthritis, or gout.  GENITOURINARY:  frequency, urgency, dysuria, difficulty voiding, hematuria or incontinence.  NEUROLOGIC:  weakness, numbness, paresthesias, seizure, tremor, stroke or memory loss.  PSYCHIATRIC:  depression, anxiety, stress, suicidal thoughts or mood swings.     Physical Exam:  Vitals:    02/28/20 1523   BP: 119/81   Pulse: 92   SpO2: 95%     Exam:  Constitutional: healthy, alert and no distress, overweight  Head: normocephalic. Atraumatic.   Eyes: no redness or jaundice noted  ENT: oropharnx normal.  MMM.  Neck supple.    Respiratory: breathing unlabored  Gastrointestinal: soft, non-tender  : deferred  Skin: no suspicious lesions or rashes  Psychiatric: mentation appears normal and affect normal/bright    Musculoskeletal exam:  Gait/Station/Posture: Gait slow  Thoracic spine:  Normal   Lumbar spine: Normal decreased in flexion and extension  Myofascial tenderness: Tender to palpation of lumbar paraspinals and gluteal musculature  Straight leg exam: negative b/l    Bilateral hip motion without discomfort     Neurologic exam:  Motor:  5/5 LE strength    Reflexes:     Patella:  R:  2/4 L: 2/4   Achilles:  R:  1/4 L: 1/4     Sensory:  (lower extremities):   Light touch: normal    Allodynia: absent    Hyperalgesia: absent     Assessment:  Zayra Ramirez is a 50 year old female with a past medical history significant for interstitial lung disease, asthma, obesity, GERD, HTN, RLS, RA who presents with complaints of low back pain.     1. Chronic low back pain: Zayra presents to clinic with long standing hx of low back pain. Mainly axial in nature but she does have some radiation into the right anterolateral thigh. She has had  many treatments for her pain previously including epidurals, radiofrequency ablations, spinal cord stimulator trial 3 months ago which was complicated by epidural abscess requiring laminectomy and drainage. Etiology of her pain is most consistent with lumbar spondylosis with facet mediated pain, lumbar radiculitis, physical deconditioning. She is being seen at the [facility] comprehensive pain clinic for her back pain.     Plan:  Diagnosis reviewed, treatment options addressed, and risks/benefits discussed. The patient was involved in shared decision making regarding the plan as laid out below.    1. Physical Therapy:   Recommend trying to start exercises classed in the pool.   2. Diagnostic Studies:  No new imaging indicated.   3. Medication Management:  Try OTC topical CBD to see if that provides any benefit. No other medication changes made today.   4. Further procedures recommended: None at this time.   5. Complementary treatments: Consider acupuncture.   6. Other: Try using a TENS unit to see if it provides some benefit.   7. Follow up: Recommend following up with provider at [facility] pain clinic.     Janice Bolaños MD  [facility] Pain Management      Total time spent face to face was 40 minutes and more than 50% of face to face time was spent in counseling and/or coordination of care regarding the diagnosis and recommendations above.

## 2020-02-28 NOTE — PATIENT INSTRUCTIONS
Thank you for coming to [facility] for your care. It is my goal to partner with you to help you reach your optimal state of health.     You were seen today for your back pain. As discussed during your visit these are the recommendations:    1. Medications:   - Continue trying the CBD topical. You can also try CBD/Hemp oil. Check with your mental health provider to see if they have concerns about this.   - Otherwise you are on good medications to help with back pain  2. Therapies: Recommend starting exercise classes in the water.   3. It would be worthwhile to try acupuncture. You can check with your insurance company to see if this is covered.   4. Other: Try using a TENS unit if you are doing an activity that typically aggravates your pain. You can purchase one online. TENS JobSerf0 2nd Edition Digital TENS Unit with Accessories   6. Follow up: Recommend following up with your provider at the [facility] pain clinic.   ----------------------------------------------------------------  Clinic Number:  [phone]     Call with any questions about your care and for scheduling assistance.     Calls are returned Monday through Friday between 8 AM and 4:30 PM. We usually get back to you within 2 business days depending on the issue/request.    If we are prescribing your medications:    For opioid medication refills, call the clinic or send a Leeo message 7 days in advance.  Please include:    Name of requested medication    Name of the pharmacy.    For non-opioid medications, call your pharmacy directly to request a refill. Please allow 3-4 days to be processed.     Per [place] State Law:    All controlled substance prescriptions must be filled within 30 days of being written.      For those controlled substances allowing refills, pickup must occur within 30 days of last fill.      We believe regular attendance is key to your success in our program!      Any time you are unable to keep your appointment we ask  that you call us at least 24 hours in advance to cancel.This will allow us to offer the appointment time to another patient.     Multiple missed appointments may lead to dismissal from the clinic.

## 2020-03-01 NOTE — TELEPHONE ENCOUNTER
methotrexate 2.5 MG tablet  Last Written Prescription Date: 6/28/19  Last Fill Quantity: 96,   # refills: 2  Last Office Visit:11/26/19  Future Office visit:  5/27/20      CBC RESULTS:   Recent Labs   Lab Test 02/26/20  1349   WBC 8.5   RBC 4.53   HGB 14.1   HCT 43.4   MCV 96   MCH 31.1   MCHC 32.5   RDW 14.5          Creatinine   Date Value Ref Range Status   02/26/2020 0.81 0.52 - 1.04 mg/dL Final   ]    Liver Function Studies -   Recent Labs   Lab Test 02/26/20  1349   PROTTOTAL 7.0   ALBUMIN 3.5   BILITOTAL 0.3   ALKPHOS 85   AST 13   ALT 20       Routing refill request to provider for review/approval because: provider review required.

## 2020-03-10 PROBLEM — M54.6 CHRONIC BILATERAL THORACIC BACK PAIN: Status: RESOLVED | Noted: 2019-12-10 | Resolved: 2020-03-10

## 2020-03-10 PROBLEM — G89.29 CHRONIC BILATERAL THORACIC BACK PAIN: Status: RESOLVED | Noted: 2019-12-10 | Resolved: 2020-03-10

## 2020-03-10 NOTE — PROGRESS NOTES
"Discharge Note        Zayra failed to follow up and current status is unknown.  Please see information below for last relevant information on current status.  Patient seen for 5 visits.    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 Still sore but able to tolerate longer time before has to lean over. Supine towel extension did not go well, reports low back \"went out\" for 1-2 days.   .  Current pain level is  .     Previous pain level was  5/10.   Changes in function:  Yes (See Goal flowsheet attached for changes in current functional level)  Adverse reaction to treatment or activity: None    OBJECTIVE  Changes noted in objective findings: Needs reminders for posture/cues for scap retraction.      ASSESSMENT/PLAN  Diagnosis: B thoracic pain   Updated problem list and treatment plan:     STG/LTGs have been met or progress has been made towards goals:  Yes, please see goal flowsheet for most current information  Assessment of Progress: current status is unknown.    Last current status:     Self Management Plans:  HEP  I have re-evaluated this patient and find that the nature, scope, duration and intensity of the therapy is appropriate for the medical condition of the patient.  Zayra continues to require the following intervention to meet STG and LTG's:  HEP.    Recommendations:  Discharge with current home program.  Patient to follow up with MD as needed.    Please refer to the daily flowsheet for treatment today, total treatment time and time spent performing 1:1 timed codes.        "

## 2020-03-17 ENCOUNTER — PRE VISIT (OUTPATIENT)
Dept: ORTHOPEDICS | Facility: CLINIC | Age: 50
End: 2020-03-17

## 2020-03-18 DIAGNOSIS — M54.6 CHRONIC BILATERAL THORACIC BACK PAIN: ICD-10-CM

## 2020-03-18 DIAGNOSIS — G89.29 CHRONIC BILATERAL THORACIC BACK PAIN: ICD-10-CM

## 2020-03-18 NOTE — TELEPHONE ENCOUNTER
[facility] Call Center    Phone Message    May a detailed message be left on voicemail: yes     Reason for Call: Medication Refill Request    Has the patient contacted the pharmacy for the refill? Yes   Name of medication being requested: oxyCODONE-acetaminophen (PERCOCET) 5-325 MG tablet   Provider who prescribed the medication: Dulce Cortez   Pharmacy: [facility] [id number] [address]    Date medication is needed: ASAP - Pt is out of this medication          Action Taken: Message routed to:  [facility] ([facility]    Travel Screening: Not Applicable

## 2020-03-19 NOTE — TELEPHONE ENCOUNTER
Patient Requested  oxyCODONE-acetaminophen (PERCOCET) 5-325 MG tablet  Last Filled  02/26/2020  Last Office Visit  02/26/2020  Next Office Visit  Nothing scheduled   Checked  03/19/2020    DX:     Pharmacy:     [facility]GRON CMA at 7:08 AM on 3/19/2020.

## 2020-03-20 RX ORDER — OXYCODONE AND ACETAMINOPHEN 5; 325 MG/1; MG/1
1 TABLET ORAL EVERY 8 HOURS PRN
Qty: 10 TABLET | Refills: 0 | Status: SHIPPED | OUTPATIENT
Start: 2020-03-20 | End: 2020-06-16

## 2020-03-20 NOTE — TELEPHONE ENCOUNTER
Prescription provided at last visit was not intended to be long-term prescription. Short refill provided. Continue to follow with pain clinic for further recommendations.  NICKO Griffith CNP

## 2020-04-21 ENCOUNTER — VIRTUAL VISIT (OUTPATIENT)
Dept: ANESTHESIOLOGY | Facility: CLINIC | Age: 50
End: 2020-04-21
Payer: COMMERCIAL

## 2020-04-21 DIAGNOSIS — M79.2 NEUROPATHIC PAIN: Primary | ICD-10-CM

## 2020-04-21 RX ORDER — NAPROXEN 500 MG/1
2 TABLET ORAL DAILY
COMMUNITY
Start: 2020-03-11 | End: 2020-06-16

## 2020-04-21 ASSESSMENT — ENCOUNTER SYMPTOMS
ALTERED TEMPERATURE REGULATION: 0
WEIGHT GAIN: 0
CHILLS: 1
INCREASED ENERGY: 0
NIGHT SWEATS: 0
POLYPHAGIA: 0
FATIGUE: 1
WEIGHT LOSS: 0
FEVER: 0
DECREASED APPETITE: 0
POLYDIPSIA: 0

## 2020-04-21 ASSESSMENT — PAIN SCALES - GENERAL: PAINLEVEL: MILD PAIN (3)

## 2020-04-21 NOTE — PROGRESS NOTES
"Zayra Ramirez is a 50 year old female who is being evaluated via a billable video visit.      The patient has been notified of following:     \"This video visit will be conducted via a call between you and your physician/provider. We have found that certain health care needs can be provided without the need for an in-person physical exam.  This service lets us provide the care you need with a video conversation.  If a prescription is necessary we can send it directly to your pharmacy.  If lab work is needed we can place an order for that and you can then stop by our lab to have the test done at a later time.    Video visits are billed at different rates depending on your insurance coverage.  Please reach out to your insurance provider with any questions.    If during the course of the call the physician/provider feels a video visit is not appropriate, you will not be charged for this service.\"    Patient has given verbal consent for Video visit? Yes    How would you like to obtain your AVS? Carine    Patient would like the video invitation sent by: Send to e-mail at: [email or web address]      Video Start Time: 8:06 AM    Additional provider notes:     Video-Visit Details    Type of service:  Video Visit    Video End Time (time video stopped):    Originating Location (pt. Location): Distant Location (provider location):  [facility] FOR COMPREHENSIVE PAIN MANAGEMENT     Mode of Communication:  Video Conference via Larisa Yanez    "

## 2020-04-21 NOTE — PROGRESS NOTES
"Zayra Ramirez is a 50 year old female who is being evaluated via a billable telephone visit.      The patient has been notified of following:     \"This telephone visit will be conducted via a call between you and your physician/provider. We have found that certain health care needs can be provided without the need for a physical exam.  This service lets us provide the care you need with a short phone conversation.  If a prescription is necessary we can send it directly to your pharmacy.  If lab work is needed we can place an order for that and you can then stop by our lab to have the test done at a later time.    Telephone visits are billed at different rates depending on your insurance coverage. During this emergency period, for some insurers they may be billed the same as an in-person visit.  Please reach out to your insurance provider with any questions.    If during the course of the call the physician/provider feels a telephone visit is not appropriate, you will not be charged for this service.\"    Patient has given verbal consent for Telephone visit?  Yes          Phone call duration: 15 minutes    Tram Florian MD          Pain clinic follow up note    HPI:   Zayra Ramirez is a 50 year old year old female  who presents to the pain clinic via telephone visit for follow-up regarding low back pain.  The patient was scheduled for a video visit however the clinic has been having problems connecting and sending in lites to join the video conference to patient and therefore the visit was converted to a telephone visit    Patient Supplied Answers To the [facility] Questionnaire  [facility] -  Patient Entered Questionnaire/Answers 4/21/2020   What number best describes your pain right now:  0 = No pain  to  10 = Worst pain imaginable 3   How would you describe the pain? dull, aching   Which of the following worsen your pain? lying down, standing, sitting, walking, exercise, coughing / sneezing, urination, bowel " movements   Which of the following improve or reduce your pain?  medication, nothing relieves the pain   What number best describes your average pain for the past week:  0 = No pain  to  10 = Worst pain imaginable 4   What number best describes your LOWEST pain in past 24 hours:  0 = No pain  to  10 = Worst pain imaginable 2   What number best describes your WORST pain in past 24 hours:  0 = No pain  to  10 = Worst pain imaginable 5   When is your pain worst? Constant   What non-medicine treatments have you already had for your pain? acupuncture, exercise   Have you tried treating your pain with medication?  Yes   Are you currently taking medications for your pain? Yes             The patient reported excellent back pain relief after the lumbar radiofrequency ablation.  However her low back pain flared 2 months ago and she had an ER visit.  She was prescribed Percocet at night which has been helpful.  Currently she is in acupuncture program which provides her with relief for 3 days.  She reports that her back feels extremely stiff during the day and she is afraid of falling she feels that she might collapse.  She states that she has not had a fall as yet.  She reports pain radiation especially to the right thigh laterally and then to the front in the knee in the L4 distribution  The patient is on ropinirole for restless leg syndrome.  She is also on 120 mg of duloxetine to help with her mental health and low back pain.  She continues to heal from her spinal infection complication after a spinal cord stimulator placement in October 2019.  She reports that her psychiatrist has concerns of trialing gabapentin as it may affect her mood negatively.    ROS:  Review of Systems   Constitutional: Positive for chills. Negative for fever and weight loss.   Endo/Heme/Allergies: Negative for polydipsia.   All other systems reviewed and are negative.    Answers for HPI/ROS submitted by the patient on 4/21/2020   General Symptoms:  Yes  Skin Symptoms: No  HENT Symptoms: No  EYE SYMPTOMS: No  HEART SYMPTOMS: No  LUNG SYMPTOMS: No  INTESTINAL SYMPTOMS: No  URINARY SYMPTOMS: No  GYNECOLOGIC SYMPTOMS: No  BREAST SYMPTOMS: No  SKELETAL SYMPTOMS: No  BLOOD SYMPTOMS: No  NERVOUS SYSTEM SYMPTOMS: No  MENTAL HEALTH SYMPTOMS: No  Loss of appetite: No  Weight gain: No  Fatigue: Yes  Night sweats: No  Increased stress: No  Excessive hunger: No  Feeling hot or cold when others believe the temperature is normal: No  Loss of height: No  Post-operative complications: No  Surgical site pain: No  Change in or Loss of Energy: No  Hyperactivity: No  Confusion: No        Significant Medical History:   Past Medical History:   Diagnosis Date     Allergic rhinitis      Anemia      Arthritis      Asthma     copd     Dental abscess 8-2015     Depressive disorder      Gastroesophageal reflux disease      History of emphysema      Hoarseness      Hypertension      Morbid obesity with BMI of 40.0-44.9, adult (H)      Obstructive sleep apnea      Respiratory bronchiolitis interstitial lung disease (H)      Sleep apnea           Past Surgical History:  Past Surgical History:   Procedure Laterality Date     CHOLECYSTECTOMY       COLONOSCOPY       COLONOSCOPY N/A 2/6/2020    Procedure: COLONOSCOPY, WITH POLYPECTOMY AND BIOPSY;  Surgeon: Julian Mccullough MD;  Location:  GI     ENT SURGERY       ESOPHAGOSCOPY, GASTROSCOPY, DUODENOSCOPY (EGD), COMBINED N/A 2/6/2020    Procedure: ESOPHAGOGASTRODUODENOSCOPY (EGD);  Surgeon: Julian Mccullough MD;  Location:  GI     EXCISE LESION INTRAORAL Bilateral 10/3/2018    Procedure: EXCISE LESION INTRAORAL;  Wide Local Excision Of of Left Oral Cavity Ulcer;  Surgeon: Morro Mijares MD;  Location:  OR      DRAIN SKIN ABSCESS SIMPLE/SINGLE  3/16/2012    Procedure:INCISION AND DRAINAGE, ABSCESS, SIMPLE; Surgeon:CHRISTIANO HANCOCK; Location: GI     HEAD & NECK SURGERY       HYSTERECTOMY       INCISION AND  DRAINAGE ABDOMEN WASHOUT, COMBINED       LAMINECTOMY THORACIC ONE LEVEL N/A 8/19/2019    Procedure: LAMINECTOMY, SPINE, THORACIC, 11-12 and Part of Lumbar 1, DRAINAGE OF EPIDURAL ABCESS, Epidural Drain Placement X 2;  Surgeon: Yadiel Beal MD;  Location: [facility] OR     RADIO FREQUENCY ABLATION / DESTRUCTION OF SACROILOAC JOINT DORSAL PRIMARY RAMUS Bilateral 12/17/2019    Procedure: Bilateral lumbar radiofrequency ablation with fluoroscopy and intravenous sedation ( Lumbar 2,3,4,5 medial branch nerves for the bilateral lumbar3-4, 4-5 and 5-sacral1 joints.;  Surgeon: Tram Florian MD;  [facility]     spinal cord stimulator  08/08/2019     spinal cord stimulator removal  08/13/2019          Family History:  Family History   Problem Relation Age of Onset     Other Cancer Father         base of tongue cancer at age ~65     Hypertension Father      Back Pain Father      Asthma Mother           Social History:  Social History     Socioeconomic History     Marital status: Single     Spouse name: Not on file     Number of children: Not on file     Years of education: Not on file     Highest education level: Not on file   Occupational History     Not on file   Social Needs     Financial resource strain: Not on file     Food insecurity     Worry: Not on file     Inability: Not on file     Transportation needs     Medical: Not on file     Non-medical: Not on file   Tobacco Use     Smoking status: Current Every Day Smoker     Packs/day: 1.00     Years: 30.00     Pack years: 30.00     Types: Cigarettes     Start date: 1/1/1996     Smokeless tobacco: Never Used   Substance and Sexual Activity     Alcohol use: No     Binge frequency: Monthly     Drug use: Yes     Types: Marijuana     Comment: very rarely      Sexual activity: Never   Lifestyle     Physical activity     Days per week: Not on file     Minutes per session: Not on file     Stress: Not on file   Relationships     Social connections     Talks on phone: Not on  file     Gets together: Not on file     Attends Denominational service: Not on file     Active member of club or organization: Not on file     Attends meetings of clubs or organizations: Not on file     Relationship status: Not on file     Intimate partner violence     Fear of current or ex partner: Not on file     Emotionally abused: Not on file     Physically abused: Not on file     Forced sexual activity: Not on file   Other Topics Concern     Parent/sibling w/ CABG, MI or angioplasty before 65F 55M? Not Asked   Social History Narrative     Not on file     Social History     Social History Narrative     Not on file          Allergies:  Allergies   Allergen Reactions     Bee Venom Anaphylaxis     Bees      Doxycycline Anaphylaxis     Patient thinks it may have been just nausea and vomiting, however unable to confirm     Erythromycin Anaphylaxis and Shortness Of Breath     Other reaction(s): Vomiting     Hydrocodone-Acetaminophen Itching       Current Medications:   Current Outpatient Medications   Medication Sig Dispense Refill     albuterol (PROAIR HFA, PROVENTIL HFA, VENTOLIN HFA) 108 (90 BASE) MCG/ACT inhaler Inhale 2 puffs into the lungs every 6 hours as needed for shortness of breath / dyspnea or wheezing (PT last dose 1.23.2020)        ARIPiprazole (ABILIFY) 15 MG tablet Take 15 mg by mouth At Bedtime        busPIRone HCl (BUSPAR) 30 MG tablet Take 15 mg by mouth At Bedtime       cholecalciferol ( ULTRA STRENGTH) 2000 units CAPS TAKE ONE CAPSULE BY MOUTH DAILY IN AM       cyclobenzaprine (FLEXERIL) 10 MG tablet Take 1 tablet (10 mg) by mouth 3 times daily (Patient taking differently: Take 10 mg by mouth At Bedtime ) 30 tablet 0     DULoxetine (CYMBALTA) 60 MG capsule Take 120 mg by mouth At Bedtime        EPINEPHrine (EPIPEN/ADRENACLICK/OR ANY BX GENERIC EQUIV) 0.3 MG/0.3ML injection 2-pack INJECT 0.3ML INTO THE MUSCLE ONCE AS NEEDED FOR ANAPHYLAXIS       ferrous sulfate (FEROSUL) 325 (65 Fe) MG tablet Take  325 mg by mouth At Bedtime   0     fluticasone-salmeterol (ADVAIR) 500-50 MCG/DOSE diskus inhaler Inhale 1 puff into the lungs 2 times daily        folic acid (FOLVITE) 1 MG tablet Take 1 tablet (1 mg) by mouth daily (Patient taking differently: Take 1 mg by mouth every morning ) 90 tablet 3     lisinopril-hydrochlorothiazide (PRINZIDE/ZESTORETIC) 20-25 MG per tablet Take 1 tablet by mouth daily (Patient taking differently: Take 1 tablet by mouth every morning ) 30 tablet 0     methotrexate 2.5 MG tablet TAKE 9 TABLETS BY MOUTH ONCE WEEKLY 108 tablet 3     montelukast (SINGULAIR) 10 MG tablet Take 10 mg by mouth At Bedtime       omeprazole (PRILOSEC) 40 MG DR capsule Take 40 mg by mouth as needed (PT last dose appr 2 weeks ago)       oxyCODONE-acetaminophen (PERCOCET) 5-325 MG tablet Take 1 tablet by mouth every 8 hours as needed for severe pain 10 tablet 0     OXYGEN-HELIUM IN 2-3L PRN during the day, 3L @ night       rOPINIRole (REQUIP) 2 MG tablet Take 2 mg by mouth nightly as needed        vitamin C 500 MG TABS Take 500 mg by mouth daily (with lunch)        zinc sulfate (ZINCATE) 220 (50 Zn) MG capsule Take 220 mg by mouth daily (with lunch)                Current Pain Medications:    Percocet at night    Physical Exam:     LMP  (LMP Unknown)     Patient was actively involved in the conversation.  She was breathing normally during the conversation.  She answered questions appropriately.      ASSESSMENT AND PLAN:     Encounter Diagnosis:  1.  Right L4 lumbar radiculopathy    2.  Lumbar spondylosis  3. Sacroiliac dysfunction, right  4. S/P epidural abscess with laminectomy and drainage  5. Lateral femoral cutaneous nerve entrapment right    Zayra Ramirez is a 50 year old year old female  who presents to the pain clinic with pain in the lower back and component possible right L4 radiculopathy versus lateral femoral cutaneous nerve entrapment  RECOMMENDATIONS:     1. Medications: She will continue Cymbalta 120 mg  as prescribed.  She will contact her psychiatrist to discuss if trialing pregabalin or nortriptyline are an option to help further with her neuropathic pain    2. Procedure: The patient may be a candidate for SI joint steroid injections to help with the low back pain further.  I would refrain from lumbar epidural steroid injections given that she recovered from epidural abscess in August 2019.  My plan would be to titrate neuropathic oral medications until the end of 2020.  In August 2020 will consider repeating a MRI with contrast and then consider further neuraxial interventions.    Follow up: Early May after her appointment with her psychiatrist

## 2020-04-21 NOTE — PATIENT INSTRUCTIONS
Medications:    Continue with Cymbalta 120 mg, as prescribed.     Contact your Psychiatrist to discuss the trial of Pregabalin or Nortriptyline for Neuropathic pain.     Procedures:    Consider SI joint injections in the future.       Recommended Follow up:  Please call to schedule a follow up appointment with Dr. Florian, in MAY, after your appointment with Psychiatry.         To speak with a nurse, schedule/reschedule/cancel a clinic appointment, or request a medication refill call: [phone]    You can also reach us by [employer]: [email or web address]    Please provide the clinic with a minium of 1 week notice, on all prescription refills.

## 2020-05-13 DIAGNOSIS — M46.1 SACROILIITIS (H): Primary | ICD-10-CM

## 2020-05-27 ENCOUNTER — VIRTUAL VISIT (OUTPATIENT)
Dept: RHEUMATOLOGY | Facility: CLINIC | Age: 50
End: 2020-05-27
Attending: NURSE PRACTITIONER
Payer: COMMERCIAL

## 2020-05-27 DIAGNOSIS — Z79.899 HIGH RISK MEDICATION USE: ICD-10-CM

## 2020-05-27 DIAGNOSIS — M19.90 INFLAMMATORY ARTHRITIS: Primary | ICD-10-CM

## 2020-05-27 DIAGNOSIS — M25.812 SHOULDER IMPINGEMENT, LEFT: ICD-10-CM

## 2020-05-27 RX ORDER — FOLIC ACID 1 MG/1
1 TABLET ORAL DAILY
Qty: 90 TABLET | Refills: 3 | Status: SHIPPED | OUTPATIENT
Start: 2020-05-27 | End: 2021-03-12

## 2020-05-27 NOTE — LETTER
"2020       RE: Zayra Ramirez  [address] [place] [address]     Dear Colleague,    Thank you for referring your patient, Zayra Ramirez, to the [facility] RHEUMATOLOGY at [facility]. Please see a copy of my visit note below.    [facility]  Rheumatology Clinic  2020     Name: Zayra Ramirez  MRN: [id number]  Age: 50 year old  : 1970  Referring provider: Aime Mason    Assessment and Plan:  # Seronegative inflammatory arthritis:  Inflammatory arthritis greatly improved, no longer ankle pain and bilateral shoulder pain. Exam shows left shoulder reduced adduction/IR but improved, Blood work from Feb revealed normal basic metabolic panel, CRP elevated at 15, and normal CBC. Sed rate was 14. History MR of the lumbar spine showed no evidence of epidural abscess after evacuation of the T12-L2 laminectomy, no symptoms now     Inflammatory arthritis is overall improved on methotrexate monotherapy. I recommend continuing methotrexate 22.5 mg once weekly. While on the drug, patient should use folic acid 1 mg daily and undergo CBC, LFTs, and creatinine every 3-4 months.     # Impingement, left shoulder:  Continue performing daily physical therapy exercises and  \"cane raising\" exercises to maintain flexion range of motion on the left. Improved with accupuncture    # Tobacco abuse:   I counseled the patient to quit smoking and advise annual physical, cancer screening and ASCVD, routine dental exams. Advise pneumonia 23 vaccine.     Follow-up: Return to clinic in 6 months with Dr. Saenz and me in year     HPI:   Zayra Ramirez is a 50 year old female with a history including seronegative inflammatory arthritis. -ZHOU -RF -CCP -Ro52 -Ro60 -SSb. No x-rays hands/feet     Copy forward  Dr. Saenz :   Seen Dr. Saenz 2019, at which time seronegative inflammatory arthritis was flaring with gradually increasing bilateral shoulder and ankle " discomfort. We planned to start methotrexate 12.5 mg (half dose) now, and resume full dose 22.5 mg weekly at week 2, coincident with planned final dose of Ancef given for a complete six week course.      Today, the patient reports some discomfort in the left shoulder, but is otherwise well. She states she had a short course of prednisone for a couple weeks, but has been off of this now for a matter of weeks. She explains that her short course of prednisone was helpful. She noted the return of left shoulder pain after stopping prednisone as well. She reports that she has also had 4 doses of methotrexate (9 tablets) since our last visit. She also takes folic acid daily with the exception of the days she takes methotrexate.     She reports that she has morning joint stiffness for around one hour and is unsure if this is improved. She states that she takes naproxen for back pain. She denies significant ankle pain or other new symptoms or concerns. She states that she performs physical therapy exercises daily for her shoulder including stretching and wall-walking.     Patient was seen by Dr. Elizabeth in Infectious Disease on September 25th in follow-up of spinal epidural abscess.  Normalization of inflammatory markers and markedly improved symptoms were recorded. Completion of a 6-week course of cefazolin for a spinal abscess was recommended.     She reports that her right fingers/hand have seen to move or twitch as noticed by her family.     Patient saw Dr. Florian in [facility] in follow-up of lumbar spondylosis on November 4th. Chronic lower right back pain was reported. Improvement in pain after prior RF ablation at bilateral L2-L5 was noted. Plans to repeat the radiofrequency ablation procedure were arranged.    Initial visit May 27, 2020 Keyon Branham APRN, CNP, MSN    Rheumatoid arthritis (RA) improved, and doing well. Doing accupuncture for bilateral shoulder pain, chronic is now fully resolved. She is doing daily HEP  from prior Physical Therapy and noticing improved ROM. No residual issues from epidural spinal abscess last year. Denies any fever, chills, SOB, BLANK, night sweats, or chest pain, high fever, cough, travel in last 14 days or exposure to covid-19 (coronavirus). Reports healthy. Staying home and following CDC guidelines. Knees mild red and pain with swelling, with weather changes but resolved on own with ice <1 day. Feet, hands, wrists are good. No EAS     Methotrexate  22.9 mg every 7 days MON, folic acid  1 mg day, naproxsyn 500 mg BID prn not taking and not effective, tolerating no side-effects. On prilosec.      Review of Systems:   Pertinent items are noted in HPI or as below, remainder of complete ROS is negative.      No recent problems with hearing or vision. No swallowing problems.   No breathing difficulty, shortness of breath, coughing, or wheezing.  No chest pain or palpitations.  No heart burn, indigestion, abdominal pain, nausea, vomiting, diarrhea.  No urination problems, no bloody, cloudy urine, no dysuria.  No numbing, tingling, weakness.  No headaches or confusion.  No rashes. No easy bleeding or bruising.     Allergies:   Bee Venom       Bees      Doxycycline       Erythromycin     Hydrocodone-Acetaminophen                 Past Medical History:  moderate, persistent asthma  Hypertension  bipolar II disorder  cervical stenosis with myelopathy 2017  thoracic and lumbar /facet DDD with kyphosis-MRI 8-2019    Chronic midline low back pain  Facial cellulitis  Morbid (severe) obesity due to excess calories  Dental abscess  Hypoxia  Subcutaneous emphysema  Borderline personality disorder  Esophageal stricture--improved, no issues swallowing   Gastroesophageal reflux disease   Interstitial lung disease-smoking   Moderate persistent asthma without complication  Onychomycosis  Restless leg syndrome  Seasonal allergies  Smoker  Stress  Respiratory bronchiolitis interstitial lung disease  Spinal epidural abscess  "  Tubular adenoma per bx 2-2020      Past Surgical History:  surgical fusion \"naturally\"  Hysterectomy 1997  rectocele and Cystocele surgery  Cholecystectomy   Spinal stimulator, lumbar --epidural abscess. inpt with PICC with surgery (seen Dr. Richmond) was [facility] then [facility] found abscess      Family History:    The patient's family history includes   Mother Asthma, rheumatoid arthritis (RA)    Father Back Pain and hypertension, throat and lymph cancer, heart issues, osteoarthritis  Siblings-healthy     Children- 2 healthy, step child and helped raised.   MGM-rheumatoid arthritis (RA)     Social History:  The patient reports that she has been smoking cigarettes, around 0.5 packs per day. Starting since 16 y/o. She has a 30.00 pack-year smoking history. She has never used smokeless tobacco. She reports that she does not drink alcohol or use drugs. Pot for pain at times, 2 times a week. Marital Status: Single. In relationship 20 yrs      Assessment via video:    Constitutional: WD-[id number] cooperative  Eyes: nl EOM, PERRLA, vision, conjunctiva, sclera  ENT: nl external ears, nose, hearing, lips, teeth, gums, throat  Neck: no mass or thyroid enlargement  RESP: No cough, no audible wheezing, able to talk in full sentences  Skin: no nail pitting, alopecia, rash  Neuro: nl cranial nerves, strength, sensation  Psych: nl judgement, orientation, memory, affect.  PSYCH: Alert and oriented times 3; coherent speech, normal rate and volume, able to articulate logical thoughts, able   to abstract reason, no tangential thoughts, no hallucinations or delusions  His affect is normal and pleasant  MSK: All other TMJ, neck, shoulder, elbow, wrist, hand, knee, ankle, and foot joints found normal. No deformity or nodule. Full ROM except left shoulder reduced adduction 3/4 and IR.  Remainder of exam unable to be completed due to video visits    Due to efforts to reduce the spread of COVID-19 in the clinic, state, nation, telephone visits " are encouraged currently. Patient understands that diagnose and advice is limited by the inability to exam him/her/them face-to-face.      Laboratory:   RHEUM RESULTS Latest Ref Rng & Units 9/16/2019 9/23/2019 2/26/2020   SED RATE 0 - 20 mm/h 15 15 14   CRP, INFLAMMATION 0.0 - 8.0 mg/L 10.2(H) 15.4(H) 15.1(H)   AST 0 - 45 U/L 14 18 13   ALT 0 - 50 U/L 7 8 20   ALBUMIN 3.4 - 5.0 g/dL 3.5 3.5 3.5   WBC 4.0 - 11.0 10e9/L 6.5 6.4 8.5   RBC 3.8 - 5.2 10e12/L 4.04 4.04 4.53   HGB 11.7 - 15.7 g/dL 12.9 12.7 14.1   HCT 35.0 - 47.0 % 39.1 38.9 43.4   MCV 78 - 100 fl 97 96 96   MCHC 31.5 - 36.5 g/dL 33.0 32.6 32.5   RDW 10.0 - 15.0 % 13.3 13.6 14.5    - 450 10e9/L 221 220 304   CREATININE 0.52 - 1.04 mg/dL 0.59 0.57 0.81   GFR ESTIMATE, IF BLACK >60 mL/min/[1.73:m2] >90 >90 >90   GFR ESTIMATE >60 mL/min/[1.73:m2] >90 >90 85     Component      Latest Ref Rng & Units 2/26/2020   Sodium      133 - 144 mmol/L 138   Potassium      3.4 - 5.3 mmol/L 3.7   Chloride      94 - 109 mmol/L 101   Carbon Dioxide      20 - 32 mmol/L 33 (H)   Anion Gap      3 - 14 mmol/L 4   Glucose      70 - 99 mg/dL 89   Urea Nitrogen      7 - 30 mg/dL 9   Creatinine      0.52 - 1.04 mg/dL 0.81   GFR Estimate      >60 mL/min/1.73:m2 85   GFR Estimate If Black      >60 mL/min/1.73:m2 >90   Calcium      8.5 - 10.1 mg/dL 9.2   Bilirubin Total      0.2 - 1.3 mg/dL 0.3   Albumin      3.4 - 5.0 g/dL 3.5   Protein Total      6.8 - 8.8 g/dL 7.0   Alkaline Phosphatase      40 - 150 U/L 85   ALT      0 - 50 U/L 20   AST      0 - 45 U/L 13   WBC      4.0 - 11.0 10e9/L 8.5   RBC Count      3.8 - 5.2 10e12/L 4.53   Hemoglobin      11.7 - 15.7 g/dL 14.1   Hematocrit      35.0 - 47.0 % 43.4   MCV      78 - 100 fl 96   MCH      26.5 - 33.0 pg 31.1   MCHC      31.5 - 36.5 g/dL 32.5   RDW      10.0 - 15.0 % 14.5   Platelet Count      150 - 450 10e9/L 304   Copath Report          CRP Inflammation      0.0 - 8.0 mg/L 15.1 (H)   Sed Rate      0 - 20 mm/h 14     Zayra  "NEREIDA Ramirez is a 50 year old female who is being evaluated via a billable video visit.      The patient has been notified of following:     \"This video visit will be conducted via a call between you and your physician/provider. We have found that certain health care needs can be provided without the need for an in-person physical exam.  This service lets us provide the care you need with a video conversation.  If a prescription is necessary we can send it directly to your pharmacy.  If lab work is needed we can place an order for that and you can then stop by our lab to have the test done at a later time.    Video visits are billed at different rates depending on your insurance coverage.  Please reach out to your insurance provider with any questions.    If during the course of the call the physician/provider feels a video visit is not appropriate, you will not be charged for this service.\"    Patient has given verbal consent for Video visit? Yes    How would you like to obtain your AVS? Mail a copy    Patient would like the video invitation sent by: Text to cell phone: [phone]    Will anyone else be joining your video visit? No        Video-Visit Details    Type of service:  Video Visit    Video Start Time: 111 PM  Video End Time: 1:32 PM    Originating Location (pt. Location): Home    Distant Location (provider location):  [facility] RHEUMATOLOGY     Platform used for Video Visit: NICKO Cerda CNP    Education:   1. Immunization: Reviewed CDC guidelines with patient updated, information provided to patient based on CDC guidelines for vaccination recommendations, patient will discuss with primary provider  2. Bone Health:Educated on adequate calcium and vitamin D intake, Educated on exercise, Glucocorticoid education discussed risks, benefits & potential long term side effects from use per primary provider  3. Discussed routine annual physicals, cancer screening, cardiac risk monitoring, bone " health and primary care with primary care provider. Also, educated glucocorticoid increase change of infections including shingles.   4. Educated on diagnosis, prognosis, labs, imaging, treatment options (including risks, benefits, risk of no treatment), medications (use, dose, side-effects, risks of medications including infection/cancer/bone marrow suppression, lab monitoring), infections what to do, plan of cares, goals of treatment. Clinic cards given. Websites given for education and resources.   5. Educated in Rheumatology we do not prescribe narcotics or manage chronic pain, the patient will need to discuss with primary care or go to a pain clinic for management.   6. Discussed corovid-19 prevention high risk patient precautions, social distancing, hand washing, follow CDC guidelines/resources and what to do for exposure signs or symptoms and where to go. Goal to minimize prednisone exposure to reduce infection risks,         Again, thank you for allowing me to participate in the care of your patient.      Sincerely,    NICKO Pederson CNP

## 2020-05-27 NOTE — PROGRESS NOTES
"[facility]  Rheumatology Clinic  2020     Name: Zayra Ramirez  MRN: [id number]  Age: 50 year old  : 1970  Referring provider: Aime Mason    Assessment and Plan:  # Seronegative inflammatory arthritis:  Inflammatory arthritis greatly improved, no longer ankle pain and bilateral shoulder pain. Exam shows left shoulder reduced adduction/IR but improved, Blood work from Feb revealed normal basic metabolic panel, CRP elevated at 15, and normal CBC. Sed rate was 14. History MR of the lumbar spine showed no evidence of epidural abscess after evacuation of the T12-L2 laminectomy, no symptoms now     Inflammatory arthritis is overall improved on methotrexate monotherapy. I recommend continuing methotrexate 22.5 mg once weekly. While on the drug, patient should use folic acid 1 mg daily and undergo CBC, LFTs, and creatinine every 3-4 months.     # Impingement, left shoulder:  Continue performing daily physical therapy exercises and  \"cane raising\" exercises to maintain flexion range of motion on the left. Improved with accupuncture    # Tobacco abuse:   I counseled the patient to quit smoking and advise annual physical, cancer screening and ASCVD, routine dental exams. Advise pneumonia 23 vaccine.     Follow-up: Return to clinic in 6 months with Dr. Saenz and me in year     HPI:   Zayra Ramirez is a 50 year old female with a history including seronegative inflammatory arthritis. -ZHOU -RF -CCP -Ro52 -Ro60 -SSb. No x-rays hands/feet     Copy forward  Dr. Saenz :   Seen Dr. Saenz 2019, at which time seronegative inflammatory arthritis was flaring with gradually increasing bilateral shoulder and ankle discomfort. We planned to start methotrexate 12.5 mg (half dose) now, and resume full dose 22.5 mg weekly at week 2, coincident with planned final dose of Ancef given for a complete six week course.      Today, the patient reports some discomfort in the left shoulder, but is otherwise well. " She states she had a short course of prednisone for a couple weeks, but has been off of this now for a matter of weeks. She explains that her short course of prednisone was helpful. She noted the return of left shoulder pain after stopping prednisone as well. She reports that she has also had 4 doses of methotrexate (9 tablets) since our last visit. She also takes folic acid daily with the exception of the days she takes methotrexate.     She reports that she has morning joint stiffness for around one hour and is unsure if this is improved. She states that she takes naproxen for back pain. She denies significant ankle pain or other new symptoms or concerns. She states that she performs physical therapy exercises daily for her shoulder including stretching and wall-walking.     Patient was seen by Dr. Elizabeth in Infectious Disease on September 25th in follow-up of spinal epidural abscess.  Normalization of inflammatory markers and markedly improved symptoms were recorded. Completion of a 6-week course of cefazolin for a spinal abscess was recommended.     She reports that her right fingers/hand have seen to move or twitch as noticed by her family.     Patient saw Dr. Florian in [facility] in follow-up of lumbar spondylosis on November 4th. Chronic lower right back pain was reported. Improvement in pain after prior RF ablation at bilateral L2-L5 was noted. Plans to repeat the radiofrequency ablation procedure were arranged.    Initial visit May 27, 2020 Keyon MAHARAJ CNP, MSN    Rheumatoid arthritis (RA) improved, and doing well. Doing accupuncture for bilateral shoulder pain, chronic is now fully resolved. She is doing daily HEP from prior Physical Therapy and noticing improved ROM. No residual issues from epidural spinal abscess last year. Denies any fever, chills, SOB, BLANK, night sweats, or chest pain, high fever, cough, travel in last 14 days or exposure to covid-19 (coronavirus). Reports healthy. Staying home  "and following CDC guidelines. Knees mild red and pain with swelling, with weather changes but resolved on own with ice <1 day. Feet, hands, wrists are good. No EAS     Methotrexate  22.9 mg every 7 days MON, folic acid  1 mg day, naproxsyn 500 mg BID prn not taking and not effective, tolerating no side-effects. On prilosec.      Review of Systems:   Pertinent items are noted in HPI or as below, remainder of complete ROS is negative.      No recent problems with hearing or vision. No swallowing problems.   No breathing difficulty, shortness of breath, coughing, or wheezing.  No chest pain or palpitations.  No heart burn, indigestion, abdominal pain, nausea, vomiting, diarrhea.  No urination problems, no bloody, cloudy urine, no dysuria.  No numbing, tingling, weakness.  No headaches or confusion.  No rashes. No easy bleeding or bruising.     Allergies:   Bee Venom       Bees      Doxycycline       Erythromycin     Hydrocodone-Acetaminophen                 Past Medical History:  moderate, persistent asthma  Hypertension  bipolar II disorder  cervical stenosis with myelopathy 2017  thoracic and lumbar /facet DDD with kyphosis-MRI 8-2019    Chronic midline low back pain  Facial cellulitis  Morbid (severe) obesity due to excess calories  Dental abscess  Hypoxia  Subcutaneous emphysema  Borderline personality disorder  Esophageal stricture--improved, no issues swallowing   Gastroesophageal reflux disease   Interstitial lung disease-smoking   Moderate persistent asthma without complication  Onychomycosis  Restless leg syndrome  Seasonal allergies  Smoker  Stress  Respiratory bronchiolitis interstitial lung disease  Spinal epidural abscess   Tubular adenoma per bx 2-2020      Past Surgical History:  surgical fusion \"naturally\"  Hysterectomy 1997  rectocele and Cystocele surgery  Cholecystectomy   Spinal stimulator, lumbar --epidural abscess. inpt with PICC with surgery (seen Dr. Richmond) was [facility] then N found " abscess      Family History:    The patient's family history includes   Mother Asthma, rheumatoid arthritis (RA)    Father Back Pain and hypertension, throat and lymph cancer, heart issues, osteoarthritis  Siblings-healthy     Children- 2 healthy, step child and helped raised.   MGM-rheumatoid arthritis (RA)     Social History:  The patient reports that she has been smoking cigarettes, around 0.5 packs per day. Starting since 16 y/o. She has a 30.00 pack-year smoking history. She has never used smokeless tobacco. She reports that she does not drink alcohol or use drugs. Pot for pain at times, 2 times a week. Marital Status: Single. In relationship 20 yrs      Assessment via video:    Constitutional: WD-WN-[id number] cooperative  Eyes: nl EOM, PERRLA, vision, conjunctiva, sclera  ENT: nl external ears, nose, hearing, lips, teeth, gums, throat  Neck: no mass or thyroid enlargement  RESP: No cough, no audible wheezing, able to talk in full sentences  Skin: no nail pitting, alopecia, rash  Neuro: nl cranial nerves, strength, sensation  Psych: nl judgement, orientation, memory, affect.  PSYCH: Alert and oriented times 3; coherent speech, normal rate and volume, able to articulate logical thoughts, able   to abstract reason, no tangential thoughts, no hallucinations or delusions  His affect is normal and pleasant  MSK: All other TMJ, neck, shoulder, elbow, wrist, hand, knee, ankle, and foot joints found normal. No deformity or nodule. Full ROM except left shoulder reduced adduction 3/4 and IR.  Remainder of exam unable to be completed due to video visits    Due to efforts to reduce the spread of COVID-19 in the clinic, state, nation, telephone visits are encouraged currently. Patient understands that diagnose and advice is limited by the inability to exam him/her/them face-to-face.      Laboratory:   RHEUM RESULTS Latest Ref Rng & Units 9/16/2019 9/23/2019 2/26/2020   SED RATE 0 - 20 mm/h 15 15 14   CRP, INFLAMMATION 0.0 - 8.0  "mg/L 10.2(H) 15.4(H) 15.1(H)   AST 0 - 45 U/L 14 18 13   ALT 0 - 50 U/L 7 8 20   ALBUMIN 3.4 - 5.0 g/dL 3.5 3.5 3.5   WBC 4.0 - 11.0 10e9/L 6.5 6.4 8.5   RBC 3.8 - 5.2 10e12/L 4.04 4.04 4.53   HGB 11.7 - 15.7 g/dL 12.9 12.7 14.1   HCT 35.0 - 47.0 % 39.1 38.9 43.4   MCV 78 - 100 fl 97 96 96   MCHC 31.5 - 36.5 g/dL 33.0 32.6 32.5   RDW 10.0 - 15.0 % 13.3 13.6 14.5    - 450 10e9/L 221 220 304   CREATININE 0.52 - 1.04 mg/dL 0.59 0.57 0.81   GFR ESTIMATE, IF BLACK >60 mL/min/[1.73:m2] >90 >90 >90   GFR ESTIMATE >60 mL/min/[1.73:m2] >90 >90 85     Component      Latest Ref Rng & Units 2/26/2020   Sodium      133 - 144 mmol/L 138   Potassium      3.4 - 5.3 mmol/L 3.7   Chloride      94 - 109 mmol/L 101   Carbon Dioxide      20 - 32 mmol/L 33 (H)   Anion Gap      3 - 14 mmol/L 4   Glucose      70 - 99 mg/dL 89   Urea Nitrogen      7 - 30 mg/dL 9   Creatinine      0.52 - 1.04 mg/dL 0.81   GFR Estimate      >60 mL/min/1.73:m2 85   GFR Estimate If Black      >60 mL/min/1.73:m2 >90   Calcium      8.5 - 10.1 mg/dL 9.2   Bilirubin Total      0.2 - 1.3 mg/dL 0.3   Albumin      3.4 - 5.0 g/dL 3.5   Protein Total      6.8 - 8.8 g/dL 7.0   Alkaline Phosphatase      40 - 150 U/L 85   ALT      0 - 50 U/L 20   AST      0 - 45 U/L 13   WBC      4.0 - 11.0 10e9/L 8.5   RBC Count      3.8 - 5.2 10e12/L 4.53   Hemoglobin      11.7 - 15.7 g/dL 14.1   Hematocrit      35.0 - 47.0 % 43.4   MCV      78 - 100 fl 96   MCH      26.5 - 33.0 pg 31.1   MCHC      31.5 - 36.5 g/dL 32.5   RDW      10.0 - 15.0 % 14.5   Platelet Count      150 - 450 10e9/L 304   Copath Report          CRP Inflammation      0.0 - 8.0 mg/L 15.1 (H)   Sed Rate      0 - 20 mm/h 14     Zayra Ramirez is a 50 year old female who is being evaluated via a billable video visit.      The patient has been notified of following:     \"This video visit will be conducted via a call between you and your physician/provider. We have found that certain health care needs can be " "provided without the need for an in-person physical exam.  This service lets us provide the care you need with a video conversation.  If a prescription is necessary we can send it directly to your pharmacy.  If lab work is needed we can place an order for that and you can then stop by our lab to have the test done at a later time.    Video visits are billed at different rates depending on your insurance coverage.  Please reach out to your insurance provider with any questions.    If during the course of the call the physician/provider feels a video visit is not appropriate, you will not be charged for this service.\"    Patient has given verbal consent for Video visit? Yes    How would you like to obtain your AVS? Mail a copy    Patient would like the video invitation sent by: Text to cell phone: [phone]    Will anyone else be joining your video visit? No        Video-Visit Details    Type of service:  Video Visit    Video Start Time: 111 PM  Video End Time: 1:32 PM    Originating Location (pt. Location): Home    Distant Location (provider location):  [facility] RHEUMATOLOGY     Platform used for Video Visit: NICKO Cerda CNP    Education:   1. Immunization: Reviewed CDC guidelines with patient updated, information provided to patient based on CDC guidelines for vaccination recommendations, patient will discuss with primary provider  2. Bone Health:Educated on adequate calcium and vitamin D intake, Educated on exercise, Glucocorticoid education discussed risks, benefits & potential long term side effects from use per primary provider  3. Discussed routine annual physicals, cancer screening, cardiac risk monitoring, bone health and primary care with primary care provider. Also, educated glucocorticoid increase change of infections including shingles.   4. Educated on diagnosis, prognosis, labs, imaging, treatment options (including risks, benefits, risk of no treatment), medications (use, dose, " side-effects, risks of medications including infection/cancer/bone marrow suppression, lab monitoring), infections what to do, plan of cares, goals of treatment. Clinic cards given. Websites given for education and resources.   5. Educated in Rheumatology we do not prescribe narcotics or manage chronic pain, the patient will need to discuss with primary care or go to a pain clinic for management.   6. Discussed corovid-19 prevention high risk patient precautions, social distancing, hand washing, follow CDC guidelines/resources and what to do for exposure signs or symptoms and where to go. Goal to minimize prednisone exposure to reduce infection risks,

## 2020-06-09 ENCOUNTER — VIRTUAL VISIT (OUTPATIENT)
Dept: ANESTHESIOLOGY | Facility: CLINIC | Age: 50
End: 2020-06-09
Payer: COMMERCIAL

## 2020-06-09 DIAGNOSIS — M53.3 SACRO-ILIAC PAIN: Primary | ICD-10-CM

## 2020-06-09 DIAGNOSIS — Z11.59 ENCOUNTER FOR SCREENING FOR OTHER VIRAL DISEASES: Primary | ICD-10-CM

## 2020-06-09 RX ORDER — TRAMADOL HYDROCHLORIDE 50 MG/1
50-100 TABLET ORAL EVERY 8 HOURS PRN
Qty: 180 TABLET | Refills: 0 | Status: SHIPPED | OUTPATIENT
Start: 2020-06-09 | End: 2021-01-22

## 2020-06-09 RX ORDER — PREGABALIN 50 MG/1
150 CAPSULE ORAL AT BEDTIME
Qty: 90 CAPSULE | Refills: 0 | Status: SHIPPED | OUTPATIENT
Start: 2020-06-09 | End: 2020-09-15

## 2020-06-09 ASSESSMENT — PAIN SCALES - GENERAL: PAINLEVEL: MODERATE PAIN (5)

## 2020-06-09 NOTE — PROGRESS NOTES
"Zayra Ramirez is a 50 year old female who is being evaluated via a billable video visit.      The patient has been notified of following:     \"This video visit will be conducted via a call between you and your physician/provider. We have found that certain health care needs can be provided without the need for an in-person physical exam.  This service lets us provide the care you need with a video conversation.  If a prescription is necessary we can send it directly to your pharmacy.  If lab work is needed we can place an order for that and you can then stop by our lab to have the test done at a later time.    Video visits are billed at different rates depending on your insurance coverage.  Please reach out to your insurance provider with any questions.    If during the course of the call the physician/provider feels a video visit is not appropriate, you will not be charged for this service.\"    Patient has given verbal consent for Video visit? Yes      Video-Visit Details    Type of service:  Video Visit    Video Start Time: 10:12 AM  Video End Time: 10:24 AM    Originating Location (pt. Location): Home    Distant Location (provider location):  [facility]     Platform used for Video Visit: Rosey Florian MD    Pain clinic follow up note    HPI:   Zayra Ramirez is a 50 year old year old female  who presents to the pain clinic via video visit for follow-up regarding low back pain.      Patient Supplied Answers To the [facility] Pain Questionnaire  [facility] -  Patient Entered Questionnaire/Answers 4/21/2020   What number best describes your pain right now:  0 = No pain  to  10 = Worst pain imaginable 3   How would you describe the pain? dull, aching   Which of the following worsen your pain? lying down, standing, sitting, walking, exercise, coughing / sneezing, urination, bowel movements   Which of the following improve or reduce your pain?  medication, nothing relieves the " pain   What number best describes your average pain for the past week:  0 = No pain  to  10 = Worst pain imaginable 4   What number best describes your LOWEST pain in past 24 hours:  0 = No pain  to  10 = Worst pain imaginable 2   What number best describes your WORST pain in past 24 hours:  0 = No pain  to  10 = Worst pain imaginable 5   When is your pain worst? Constant   What non-medicine treatments have you already had for your pain? acupuncture, exercise   Have you tried treating your pain with medication?  Yes   Are you currently taking medications for your pain? Yes       The patient reports discussing the option of pregabalin with her psychiatrist and states there was agreement and starting pregabalin.  The patient continues to take duloxetine and states that she has returned to work and works as a .  Her work has exacerbated her low back pain and she finds herself in tears especially in the evenings.  She states that her work involves a lot of bending and twisting.  The patient states that she was given a short prescription oxycodone with Tylenol in the past which gave her some relief she was able to obtain satisfactory relief with 1 tablet a day however she feels that 2 tablets a day would be satisfactory.  She is interested in pursuing spinal cord stimulator trial and placement however her pain started with a spinal infection and we are waiting for at least a year before pursuing a spinal cord stimulator trial      ROS:  Review of Systems   All other systems reviewed and are negative.        Significant Medical History:   Past Medical History:   Diagnosis Date     Allergic rhinitis      Anemia      Arthritis      Asthma     copd     Dental abscess 8-2015     Depressive disorder      Gastroesophageal reflux disease      History of emphysema      Hoarseness      Hypertension      Morbid obesity with BMI of 40.0-44.9, adult (H)      Obstructive sleep apnea      Respiratory bronchiolitis interstitial  lung disease (H)      Sleep apnea           Past Surgical History:  Past Surgical History:   Procedure Laterality Date     CHOLECYSTECTOMY       COLONOSCOPY       COLONOSCOPY N/A 2/6/2020    Procedure: COLONOSCOPY, WITH POLYPECTOMY AND BIOPSY;  Surgeon: Julian Mccullough MD;  Location:  GI     ENT SURGERY       ESOPHAGOSCOPY, GASTROSCOPY, DUODENOSCOPY (EGD), COMBINED N/A 2/6/2020    Procedure: ESOPHAGOGASTRODUODENOSCOPY (EGD);  Surgeon: Julian Mccullough MD;  Location:  GI     EXCISE LESION INTRAORAL Bilateral 10/3/2018    Procedure: EXCISE LESION INTRAORAL;  Wide Local Excision Of of Left Oral Cavity Ulcer;  Surgeon: Morro Mijares MD;  Location: [facility] OR     HC DRAIN SKIN ABSCESS SIMPLE/SINGLE  3/16/2012    Procedure:INCISION AND DRAINAGE, ABSCESS, SIMPLE; Surgeon:CHRISTIANO HANCOCK; Location: GI     HEAD & NECK SURGERY       HYSTERECTOMY       INCISION AND DRAINAGE ABDOMEN WASHOUT, COMBINED       LAMINECTOMY THORACIC ONE LEVEL N/A 8/19/2019    Procedure: LAMINECTOMY, SPINE, THORACIC, 11-12 and Part of Lumbar 1, DRAINAGE OF EPIDURAL ABCESS, Epidural Drain Placement X 2;  Surgeon: Yadiel Beal MD;  Location: [facility] OR     RADIO FREQUENCY ABLATION / DESTRUCTION OF SACROILOAC JOINT DORSAL PRIMARY RAMUS Bilateral 12/17/2019    Procedure: Bilateral lumbar radiofrequency ablation with fluoroscopy and intravenous sedation ( Lumbar 2,3,4,5 medial branch nerves for the bilateral lumbar3-4, 4-5 and 5-sacral1 joints.;  Surgeon: Tram Florian MD;  [facility]     spinal cord stimulator  08/08/2019     spinal cord stimulator removal  08/13/2019          Family History:  Family History   Problem Relation Age of Onset     Other Cancer Father         base of tongue cancer at age ~65     Hypertension Father      Back Pain Father      Asthma Mother           Social History:  Social History     Socioeconomic History     Marital status: Single     Spouse name: Not on file     Number of children:  Not on file     Years of education: Not on file     Highest education level: Not on file   Occupational History     Not on file   Social Needs     Financial resource strain: Not on file     Food insecurity     Worry: Not on file     Inability: Not on file     Transportation needs     Medical: Not on file     Non-medical: Not on file   Tobacco Use     Smoking status: Current Every Day Smoker     Packs/day: 1.00     Years: 30.00     Pack years: 30.00     Types: Cigarettes     Start date: 1/1/1996     Smokeless tobacco: Never Used   Substance and Sexual Activity     Alcohol use: No     Binge frequency: Monthly     Drug use: Yes     Types: Marijuana     Comment: very rarely      Sexual activity: Never   Lifestyle     Physical activity     Days per week: Not on file     Minutes per session: Not on file     Stress: Not on file   Relationships     Social connections     Talks on phone: Not on file     Gets together: Not on file     Attends Church service: Not on file     Active member of club or organization: Not on file     Attends meetings of clubs or organizations: Not on file     Relationship status: Not on file     Intimate partner violence     Fear of current or ex partner: Not on file     Emotionally abused: Not on file     Physically abused: Not on file     Forced sexual activity: Not on file   Other Topics Concern     Parent/sibling w/ CABG, MI or angioplasty before 65F 55M? Not Asked   Social History Narrative     Not on file     Social History     Social History Narrative     Not on file          Allergies:  Allergies   Allergen Reactions     Bee Venom Anaphylaxis     Bees      Doxycycline Anaphylaxis     Patient thinks it may have been just nausea and vomiting, however unable to confirm     Erythromycin Anaphylaxis and Shortness Of Breath     Other reaction(s): Vomiting     Hydrocodone-Acetaminophen Itching       Current Medications:   Current Outpatient Medications   Medication Sig Dispense Refill      albuterol (PROAIR HFA, PROVENTIL HFA, VENTOLIN HFA) 108 (90 BASE) MCG/ACT inhaler Inhale 2 puffs into the lungs every 6 hours as needed for shortness of breath / dyspnea or wheezing (PT last dose 1.23.2020)        ARIPiprazole (ABILIFY) 15 MG tablet Take 15 mg by mouth At Bedtime        busPIRone HCl (BUSPAR) 30 MG tablet Take 15 mg by mouth At Bedtime       cholecalciferol ( ULTRA STRENGTH) 2000 units CAPS TAKE ONE CAPSULE BY MOUTH DAILY IN AM       cyclobenzaprine (FLEXERIL) 10 MG tablet Take 1 tablet (10 mg) by mouth 3 times daily (Patient taking differently: Take 10 mg by mouth At Bedtime ) 30 tablet 0     DULoxetine (CYMBALTA) 60 MG capsule Take 120 mg by mouth At Bedtime        EPINEPHrine (EPIPEN/ADRENACLICK/OR ANY BX GENERIC EQUIV) 0.3 MG/0.3ML injection 2-pack INJECT 0.3ML INTO THE MUSCLE ONCE AS NEEDED FOR ANAPHYLAXIS       ferrous sulfate (FEROSUL) 325 (65 Fe) MG tablet Take 325 mg by mouth At Bedtime   0     fluticasone-salmeterol (ADVAIR) 500-50 MCG/DOSE diskus inhaler Inhale 1 puff into the lungs 2 times daily        folic acid (FOLVITE) 1 MG tablet Take 1 tablet (1 mg) by mouth daily 90 tablet 3     lisinopril-hydrochlorothiazide (PRINZIDE/ZESTORETIC) 20-25 MG per tablet Take 1 tablet by mouth daily (Patient taking differently: Take 1 tablet by mouth every morning ) 30 tablet 0     methotrexate 2.5 MG tablet TAKE 9 TABLETS BY MOUTH ONCE WEEKLY 108 tablet 3     montelukast (SINGULAIR) 10 MG tablet Take 10 mg by mouth At Bedtime       naproxen (NAPROSYN) 500 MG tablet Take 2 tablets by mouth daily       omeprazole (PRILOSEC) 40 MG DR capsule Take 40 mg by mouth as needed (PT last dose appr 2 weeks ago)       oxyCODONE-acetaminophen (PERCOCET) 5-325 MG tablet Take 1 tablet by mouth every 8 hours as needed for severe pain 10 tablet 0     OXYGEN-HELIUM IN 2-3L PRN during the day, 3L @ night       rOPINIRole (REQUIP) 2 MG tablet Take 2 mg by mouth nightly as needed        vitamin C 500 MG TABS Take 500  mg by mouth daily (with lunch)        zinc sulfate (ZINCATE) 220 (50 Zn) MG capsule Take 220 mg by mouth daily (with lunch)                Current Pain Medications:  Duloxetine 120 mg/day    Physical Exam:     LMP  (LMP Unknown)     General Appearance: No distress, standing comfortably  Mood: Euthymic  HE ENT: Non constricted pupils  Respiratory: Non labored breathing  Skin: No rashes over exposed skin      ASSESSMENT AND PLAN:     Encounter Diagnosis:  1.  Right L4 lumbar radiculopathy    2.  Lumbar spondylosis  3. Sacroiliac dysfunction, right  4. S/P epidural abscess with laminectomy and drainage  5. Lateral femoral cutaneous nerve entrapment right    Zayra Ramirez is a 50 year old year old female  who presents to the pain clinic with pain in the lower back and component possible right L4 radiculopathy versus lateral femoral cutaneous nerve entrapment    RECOMMENDATIONS:     1. Medications: We are prescribing the patient pregabalin to start at 50 mg at night for 7 days.  If tolerated she will increase to 100 mg at night for 7 days and then to 150 mg at night. Dosing, side effects, risks/benefits/alternatives were discussed with the patient in detail.  She has had relief in her symptoms with oxycodone in the past    I have prescribed her tramadol 50 mg 1 to 2 tablets up to 3 times a day.  The patient will try lower doses.  I have discussed with the patient t while the patient is on tramadol it is not recommended to operate heavy machinery or to drive    2. Procedure: We are scheduling the patient for bilateral SI joint steroid injections with fluoroscopy in the procedure suite. Risks/benefits/alternatives were discussed.     3.  The patient was provided the clinic phone number to call if she were to have any side effects to the medications prescribed    Follow up: 4 weeks from today

## 2020-06-09 NOTE — PROGRESS NOTES
"Zayra Ramirez is a 50 year old female who is being evaluated via a billable video visit.      The patient has been notified of following:     \"This video visit will be conducted via a call between you and your physician/provider. We have found that certain health care needs can be provided without the need for an in-person physical exam.  This service lets us provide the care you need with a video conversation.  If a prescription is necessary we can send it directly to your pharmacy.  If lab work is needed we can place an order for that and you can then stop by our lab to have the test done at a later time.    Video visits are billed at different rates depending on your insurance coverage.  Please reach out to your insurance provider with any questions.    If during the course of the call the physician/provider feels a video visit is not appropriate, you will not be charged for this service.\"    Patient has given verbal consent for Video visit? Yes    How would you like to obtain your AVS? Mail a copy    Patient would like the video invitation sent by: Text to cell phone: [phone]    Will anyone else be joining your video visit? No      Indira Escamilla CMA        "

## 2020-06-09 NOTE — LETTER
"6/9/2020       RE: Zayra Ramirez  [address] [place] [address]     Dear Colleague,    Thank you for referring your patient, Zayra Ramirez, to the [facility] MANAGEMENT at [facility]. Please see a copy of my visit note below.    Zayra Ramirez is a 50 year old female who is being evaluated via a billable video visit.      The patient has been notified of following:     \"This video visit will be conducted via a call between you and your physician/provider. We have found that certain health care needs can be provided without the need for an in-person physical exam.  This service lets us provide the care you need with a video conversation.  If a prescription is necessary we can send it directly to your pharmacy.  If lab work is needed we can place an order for that and you can then stop by our lab to have the test done at a later time.    Video visits are billed at different rates depending on your insurance coverage.  Please reach out to your insurance provider with any questions.    If during the course of the call the physician/provider feels a video visit is not appropriate, you will not be charged for this service.\"    Patient has given verbal consent for Video visit? Yes    How would you like to obtain your AVS? Mail a copy    Patient would like the video invitation sent by: Text to cell phone: [phone]    Will anyone else be joining your video visit? Regina Escamilla CMA          Zayra Ramirez is a 50 year old female who is being evaluated via a billable video visit.      The patient has been notified of following:     \"This video visit will be conducted via a call between you and your physician/provider. We have found that certain health care needs can be provided without the need for an in-person physical exam.  This service lets us provide the care you need with a video conversation.  If a prescription is " "necessary we can send it directly to your pharmacy.  If lab work is needed we can place an order for that and you can then stop by our lab to have the test done at a later time.    Video visits are billed at different rates depending on your insurance coverage.  Please reach out to your insurance provider with any questions.    If during the course of the call the physician/provider feels a video visit is not appropriate, you will not be charged for this service.\"    Patient has given verbal consent for Video visit? Yes      Video-Visit Details    Type of service:  Video Visit    Video Start Time: 10:12 AM  Video End Time: 10:24 AM    Originating Location (pt. Location): Home    Distant Location (provider location):  [facility] MANAGEMENT     Platform used for Video Visit: Rosey Florian MD    Pain clinic follow up note    HPI:   Zayra Ramirez is a 50 year old year old female  who presents to the pain clinic via video visit for follow-up regarding low back pain.      Patient Supplied Answers To the [facility] Pain Questionnaire  [facility] -  Patient Entered Questionnaire/Answers 4/21/2020   What number best describes your pain right now:  0 = No pain  to  10 = Worst pain imaginable 3   How would you describe the pain? dull, aching   Which of the following worsen your pain? lying down, standing, sitting, walking, exercise, coughing / sneezing, urination, bowel movements   Which of the following improve or reduce your pain?  medication, nothing relieves the pain   What number best describes your average pain for the past week:  0 = No pain  to  10 = Worst pain imaginable 4   What number best describes your LOWEST pain in past 24 hours:  0 = No pain  to  10 = Worst pain imaginable 2   What number best describes your WORST pain in past 24 hours:  0 = No pain  to  10 = Worst pain imaginable 5   When is your pain worst? Constant   What non-medicine treatments have you already had for your " pain? acupuncture, exercise   Have you tried treating your pain with medication?  Yes   Are you currently taking medications for your pain? Yes       The patient reports discussing the option of pregabalin with her psychiatrist and states there was agreement and starting pregabalin.  The patient continues to take duloxetine and states that she has returned to work and works as a .  Her work has exacerbated her low back pain and she finds herself in tears especially in the evenings.  She states that her work involves a lot of bending and twisting.  The patient states that she was given a short prescription oxycodone with Tylenol in the past which gave her some relief she was able to obtain satisfactory relief with 1 tablet a day however she feels that 2 tablets a day would be satisfactory.  She is interested in pursuing spinal cord stimulator trial and placement however her pain started with a spinal infection and we are waiting for at least a year before pursuing a spinal cord stimulator trial      ROS:  Review of Systems   All other systems reviewed and are negative.        Significant Medical History:   Past Medical History:   Diagnosis Date     Allergic rhinitis      Anemia      Arthritis      Asthma     copd     Dental abscess 8-2015     Depressive disorder      Gastroesophageal reflux disease      History of emphysema      Hoarseness      Hypertension      Morbid obesity with BMI of 40.0-44.9, adult (H)      Obstructive sleep apnea      Respiratory bronchiolitis interstitial lung disease (H)      Sleep apnea           Past Surgical History:  Past Surgical History:   Procedure Laterality Date     CHOLECYSTECTOMY       COLONOSCOPY       COLONOSCOPY N/A 2/6/2020    Procedure: COLONOSCOPY, WITH POLYPECTOMY AND BIOPSY;  Surgeon: Julian Mccullough MD;  Location:  GI     ENT SURGERY       ESOPHAGOSCOPY, GASTROSCOPY, DUODENOSCOPY (EGD), COMBINED N/A 2/6/2020    Procedure: ESOPHAGOGASTRODUODENOSCOPY  (EGD);  Surgeon: Julian Mccullough MD;  Location: [facility] GI     EXCISE LESION INTRAORAL Bilateral 10/3/2018    Procedure: EXCISE LESION INTRAORAL;  Wide Local Excision Of of Left Oral Cavity Ulcer;  Surgeon: Morro Mijares MD;  Location: [facility] OR     HC DRAIN SKIN ABSCESS SIMPLE/SINGLE  3/16/2012    Procedure:INCISION AND DRAINAGE, ABSCESS, SIMPLE; Surgeon:CHRISTIANO HANCOCK; Location: GI     HEAD & NECK SURGERY       HYSTERECTOMY       INCISION AND DRAINAGE ABDOMEN WASHOUT, COMBINED       LAMINECTOMY THORACIC ONE LEVEL N/A 8/19/2019    Procedure: LAMINECTOMY, SPINE, THORACIC, 11-12 and Part of Lumbar 1, DRAINAGE OF EPIDURAL ABCESS, Epidural Drain Placement X 2;  Surgeon: Yadiel Beal MD;  Location: [facility] OR     RADIO FREQUENCY ABLATION / DESTRUCTION OF SACROILOAC JOINT DORSAL PRIMARY RAMUS Bilateral 12/17/2019    Procedure: Bilateral lumbar radiofrequency ablation with fluoroscopy and intravenous sedation ( Lumbar 2,3,4,5 medial branch nerves for the bilateral lumbar3-4, 4-5 and 5-sacral1 joints.;  Surgeon: Tram Florian MD;  [facility]     spinal cord stimulator  08/08/2019     spinal cord stimulator removal  08/13/2019          Family History:  Family History   Problem Relation Age of Onset     Other Cancer Father         base of tongue cancer at age ~65     Hypertension Father      Back Pain Father      Asthma Mother           Social History:  Social History     Socioeconomic History     Marital status: Single     Spouse name: Not on file     Number of children: Not on file     Years of education: Not on file     Highest education level: Not on file   Occupational History     Not on file   Social Needs     Financial resource strain: Not on file     Food insecurity     Worry: Not on file     Inability: Not on file     Transportation needs     Medical: Not on file     Non-medical: Not on file   Tobacco Use     Smoking status: Current Every Day Smoker     Packs/day: 1.00     Years: 30.00      Pack years: 30.00     Types: Cigarettes     Start date: 1/1/1996     Smokeless tobacco: Never Used   Substance and Sexual Activity     Alcohol use: No     Binge frequency: Monthly     Drug use: Yes     Types: Marijuana     Comment: very rarely      Sexual activity: Never   Lifestyle     Physical activity     Days per week: Not on file     Minutes per session: Not on file     Stress: Not on file   Relationships     Social connections     Talks on phone: Not on file     Gets together: Not on file     Attends Tenriism service: Not on file     Active member of club or organization: Not on file     Attends meetings of clubs or organizations: Not on file     Relationship status: Not on file     Intimate partner violence     Fear of current or ex partner: Not on file     Emotionally abused: Not on file     Physically abused: Not on file     Forced sexual activity: Not on file   Other Topics Concern     Parent/sibling w/ CABG, MI or angioplasty before 65F 55M? Not Asked   Social History Narrative     Not on file     Social History     Social History Narrative     Not on file          Allergies:  Allergies   Allergen Reactions     Bee Venom Anaphylaxis     Bees      Doxycycline Anaphylaxis     Patient thinks it may have been just nausea and vomiting, however unable to confirm     Erythromycin Anaphylaxis and Shortness Of Breath     Other reaction(s): Vomiting     Hydrocodone-Acetaminophen Itching       Current Medications:   Current Outpatient Medications   Medication Sig Dispense Refill     albuterol (PROAIR HFA, PROVENTIL HFA, VENTOLIN HFA) 108 (90 BASE) MCG/ACT inhaler Inhale 2 puffs into the lungs every 6 hours as needed for shortness of breath / dyspnea or wheezing (PT last dose 1.23.2020)        ARIPiprazole (ABILIFY) 15 MG tablet Take 15 mg by mouth At Bedtime        busPIRone HCl (BUSPAR) 30 MG tablet Take 15 mg by mouth At Bedtime       cholecalciferol ( ULTRA STRENGTH) 2000 units CAPS TAKE ONE CAPSULE BY  MOUTH DAILY IN AM       cyclobenzaprine (FLEXERIL) 10 MG tablet Take 1 tablet (10 mg) by mouth 3 times daily (Patient taking differently: Take 10 mg by mouth At Bedtime ) 30 tablet 0     DULoxetine (CYMBALTA) 60 MG capsule Take 120 mg by mouth At Bedtime        EPINEPHrine (EPIPEN/ADRENACLICK/OR ANY BX GENERIC EQUIV) 0.3 MG/0.3ML injection 2-pack INJECT 0.3ML INTO THE MUSCLE ONCE AS NEEDED FOR ANAPHYLAXIS       ferrous sulfate (FEROSUL) 325 (65 Fe) MG tablet Take 325 mg by mouth At Bedtime   0     fluticasone-salmeterol (ADVAIR) 500-50 MCG/DOSE diskus inhaler Inhale 1 puff into the lungs 2 times daily        folic acid (FOLVITE) 1 MG tablet Take 1 tablet (1 mg) by mouth daily 90 tablet 3     lisinopril-hydrochlorothiazide (PRINZIDE/ZESTORETIC) 20-25 MG per tablet Take 1 tablet by mouth daily (Patient taking differently: Take 1 tablet by mouth every morning ) 30 tablet 0     methotrexate 2.5 MG tablet TAKE 9 TABLETS BY MOUTH ONCE WEEKLY 108 tablet 3     montelukast (SINGULAIR) 10 MG tablet Take 10 mg by mouth At Bedtime       naproxen (NAPROSYN) 500 MG tablet Take 2 tablets by mouth daily       omeprazole (PRILOSEC) 40 MG DR capsule Take 40 mg by mouth as needed (PT last dose appr 2 weeks ago)       oxyCODONE-acetaminophen (PERCOCET) 5-325 MG tablet Take 1 tablet by mouth every 8 hours as needed for severe pain 10 tablet 0     OXYGEN-HELIUM IN 2-3L PRN during the day, 3L @ night       rOPINIRole (REQUIP) 2 MG tablet Take 2 mg by mouth nightly as needed        vitamin C 500 MG TABS Take 500 mg by mouth daily (with lunch)        zinc sulfate (ZINCATE) 220 (50 Zn) MG capsule Take 220 mg by mouth daily (with lunch)                Current Pain Medications:  Duloxetine 120 mg/day    Physical Exam:     LMP  (LMP Unknown)     General Appearance: No distress, standing comfortably  Mood: Euthymic  HE ENT: Non constricted pupils  Respiratory: Non labored breathing  Skin: No rashes over exposed skin      ASSESSMENT AND PLAN:      Encounter Diagnosis:  1.  Right L4 lumbar radiculopathy    2.  Lumbar spondylosis  3. Sacroiliac dysfunction, right  4. S/P epidural abscess with laminectomy and drainage  5. Lateral femoral cutaneous nerve entrapment right    Zayra Ramirez is a 50 year old year old female  who presents to the pain clinic with pain in the lower back and component possible right L4 radiculopathy versus lateral femoral cutaneous nerve entrapment    RECOMMENDATIONS:     1. Medications: We are prescribing the patient pregabalin to start at 50 mg at night for 7 days.  If tolerated she will increase to 100 mg at night for 7 days and then to 150 mg at night. Dosing, side effects, risks/benefits/alternatives were discussed with the patient in detail.  She has had relief in her symptoms with oxycodone in the past    I have prescribed her tramadol 50 mg 1 to 2 tablets up to 3 times a day.  The patient will try lower doses.  I have discussed with the patient t while the patient is on tramadol it is not recommended to operate heavy machinery or to drive    2. Procedure: We are scheduling the patient for bilateral SI joint steroid injections with fluoroscopy in the procedure suite. Risks/benefits/alternatives were discussed.     3.  The patient was provided the clinic phone number to call if she were to have any side effects to the medications prescribed    Follow up: 4 weeks from today                  LPN called pt and reviewed their Pre-procedure instructions, and [facility]  Pt is scheduled for their COVID19 test.     Instructions emailed to the pt, then mailed to them per their request.     Pt verbalized understanding.   Doreen Colbert LPN

## 2020-06-10 NOTE — PROGRESS NOTES
LPN called pt and reviewed their Pre-procedure instructions, and AVS  Pt is scheduled for their COVID19 test.     Instructions emailed to the pt, then mailed to them per their request.     Pt verbalized understanding.   Doreen Colbert LPN

## 2020-06-10 NOTE — PATIENT INSTRUCTIONS
Medications:    pregabalin (LYRICA) 50 MG capsule-   Start 1 capsule (50 mg) at bedtime 7 days  Then increase to 2 capsules (100 mg) at bedtime 7 days.  Then increase to 3 capsules (150 mg) at bedtime. Continue at this dose.     Don't drive on this medication until you know how it effects you.  Common side effects are drowsiness and dizziness  You can go slower if any side effects  Once on higher doses, you cannot stop this medication abruptly.  Tapering instructions would need to be provided by a medical professional.    traMADol (ULTRAM) 50 MG tablet- Take 1-2 tablets ( mg) by mouth every 8 hours as needed for moderate to severe pain.     When calling in for refills for your opioid medication- You MUST call (Or MyChart) the clinic DIRECTLY and at least 7 days before you are needing your medication refilled.    Procedures:    Please call to schedule your procedure: [phone]    Bilateral Sacroiliac Joint Injection.     We have included your Pre-Procedure Instructions below. Please review and contact the clinic if you have questions.         Recommended Follow up:  4 weeks in clinic with Dr. Florian.     Procedure Information related to COVID-19    You will need to be tested for COVID-19 within 72 hours before your procedure.   Our Facility has a testing site located across the street from the Clinics and Surgery Center  (Located at:  [address] [place], [place] [id number])   Please note: You will only be contacted for positive and pending results.   Please be aware that the turn around time for the test is approximately 24-48 hours, and there is a chance that results will not be back in time for you to have your procedure. Should your results still be pending the day of your procedure, you will be notified as soon as possible, as your appointment will be cancelled and you will need to be rescheduled.     Patients will need to be dropped off at the surgery entrance (on the corner of [address]    Drivers/visitors will not be allowed in the building-NO EXCEPTIONS. Patients will need to provide contact information and make/model of 's car when checking in for procedure.     Please contact the clinic if you have further questions about this information.       Information related to Scheduling and Pre-Procedure Instructions:      Please call [phone] to schedule, reschedule, or cancel your procedure appointment.   Phones are answered Monday - Friday from 08:00 - 4:30pm.  Leave a voicemail with your name, birth date, and phone number if no one is available to take your call.     Your procedure: Bilateral SI Joint Injection,      If you are having a Diagnostic procedure, you will be given a Pain Diary to document your pain relief.   Please fax the completed form to our office.   fax number is [phone]    If you must reschedule your procedure more than two times, you must follow up in clinic before rescheduling again.      Preparing for your procedure    CAUTION - FAILURE TO FOLLOW THESE PRE-PROCEDURE INSTRUCTIONS WILL RESULT IN YOUR PROCEDURE BEING RESCHEDULED.            You must have a  take you home after your procedure. Transportation by taxi or para-transit is okay as long as you have a responsible adult accompany you. You must provide your 's full name and contact number at time of check in.     Fasting Protocol You may have NOTHING SOLID TO EAT 8 HOURS prior to arrival at the procedure area.     You may have CLEAR LIQUIDS UP TO 2 HOURS prior to arrival.    Broth and candy are considered solid food and require an eight hour fast.     Clear liquids include water, clear fruit juice (no pulp), carbonated beverages, ice, black coffee, black tea, clear jello. No alcohol containing beverages.   Medications If you take any medications, DO NOT STOP. Take your medications as usual the day of your procedure with a sip of water AT LEAST 2 HOURS PRIOR TO ARRIVAL.    Antibiotics If you  are currently taking antibiotics, you must complete the entire dose 7 days prior to your scheduled procedure. You must be clear of any signs or symptoms of infection. If you begin antibiotics, please contact our clinic for instructions.     Fever, Chills, or Rash If you experience a fever of higher than 100 degrees, chills, rash, or open wounds during the one week before your procedure, please call the clinic to see if you may proceed with your procedure.      Medication Hold List  **Patients under Cardiology/Neurology care should consult their provider prior to the pain procedure to verify pre-procedure medication instructions. The information below contains general guidelines.**    Blood Thinners If you are taking daily ASPIRIN, PLAVIX, OR OTHER BLOOD THINNERS SUCH AS COUMADIN/WARFARIN, we will need your prescribing doctor to sign a release permitting you to stop these medications. Once approved by your prescribing doctor - STOP ALL BLOOD THINNERS BASED ON THE TIME TABLE BELOW PRIOR TO YOUR PROCEDURE. If you have been instructed to stop WARFARIN(COUMADIN), you must have an INR lab drawn the day before your procedure. . Your INR must be within normal limits before we can perform your injection. MEDICATIONS CAN BE RESTARTED AFTER YOUR PROCEDURE.    14 DAY HOLD  Ticlid (ticlopidine)    10 DAY HOLD  Effient (Prasugel)    3 DAY HOLD  Xarelto (rivaroxaban) 7 DAY HOLD  Anacin, Bufferin, Ecotrin, Excedrin, Aggrenox (Aspirin)  Brilinta (ticagrelor)  Coumadin (Warfarin)  Pradexa (Dabigatran)  Elmiron (Pentosan)  Plavix (Clopidogrel Bisulfate)  Pletal (Cilostazol)    24 HOUR HOLD  Lovenox (enoxaparin)  Agrylin (Anagrelide)        Non-steroidal Anti-inflammatories (NSAIDs) DO NOT TAKE any non-steroidal anti-inflammatory medications (NSAIDs) listed on the table below. MEDICATIONS CAN BE RESTARTED AFTER YOUR PROCEDURE. Celebrex is OK to take and does not need to be discontinued.     Medications to stop:  3 DAY HOLD  Advil,  Motrin (Ibuprofen)  Arthrotec (diciofenac sodium/misoprostol)  Clinoril (Sulindac)  Indocin (Indomethacin)  Lodine (Etodolac)  Toradol (Ketorolac)  Vicoprofen (Hydrocodone and Ibuprofen)  Voltaren (Diclotenac)    14 DAY HOLD  Daypro (Oxaprozin)  Feldene (Piroxicam)   7 DAY HOLD  Aleve (Naproxen sodium)  Darvon compound (contains aspirin)  Naprosyn (Naproxen)  Norgesic Forte (contains aspirin)  Mobic (Meloxicam)  Oruvall (Ketoprofen)  Percodan (contains aspirin)  Relafen (Nabumetone)  Salsalate  Trilisate  Vitamin E (more than 400 mg per day)  Any medication containing aspirin                To speak with a nurse, schedule/reschedule/cancel a clinic appointment, or request a medication refill call: [phone]     You can also reach us by [employer]: [email or web address]

## 2020-06-14 DIAGNOSIS — Z11.59 ENCOUNTER FOR SCREENING FOR OTHER VIRAL DISEASES: ICD-10-CM

## 2020-06-14 PROCEDURE — 99000 SPECIMEN HANDLING OFFICE-LAB: CPT | Performed by: ANESTHESIOLOGY

## 2020-06-14 PROCEDURE — U0003 INFECTIOUS AGENT DETECTION BY NUCLEIC ACID (DNA OR RNA); SEVERE ACUTE RESPIRATORY SYNDROME CORONAVIRUS 2 (SARS-COV-2) (CORONAVIRUS DISEASE [COVID-19]), AMPLIFIED PROBE TECHNIQUE, MAKING USE OF HIGH THROUGHPUT TECHNOLOGIES AS DESCRIBED BY CMS-2020-01-R: HCPCS | Mod: 90 | Performed by: ANESTHESIOLOGY

## 2020-06-15 LAB
SARS-COV-2 RNA SPEC QL NAA+PROBE: NOT DETECTED
SPECIMEN SOURCE: NORMAL

## 2020-06-15 RX ORDER — DIAZEPAM 5 MG
5 TABLET ORAL EVERY 6 HOURS PRN
Status: CANCELLED | OUTPATIENT
Start: 2020-06-15

## 2020-06-16 ENCOUNTER — HOSPITAL ENCOUNTER (OUTPATIENT)
Facility: AMBULATORY SURGERY CENTER | Age: 50
End: 2020-06-16
Attending: ANESTHESIOLOGY
Payer: COMMERCIAL

## 2020-06-16 ENCOUNTER — ANCILLARY PROCEDURE (OUTPATIENT)
Dept: RADIOLOGY | Facility: AMBULATORY SURGERY CENTER | Age: 50
End: 2020-06-16
Attending: ANESTHESIOLOGY
Payer: COMMERCIAL

## 2020-06-16 VITALS
SYSTOLIC BLOOD PRESSURE: 154 MMHG | RESPIRATION RATE: 16 BRPM | HEIGHT: 63 IN | DIASTOLIC BLOOD PRESSURE: 109 MMHG | HEART RATE: 75 BPM | TEMPERATURE: 98.3 F | OXYGEN SATURATION: 95 % | WEIGHT: 248 LBS | BODY MASS INDEX: 43.94 KG/M2

## 2020-06-16 DIAGNOSIS — M53.3 SACRO-ILIAC PAIN: ICD-10-CM

## 2020-06-16 DIAGNOSIS — R52 PAIN: ICD-10-CM

## 2020-06-16 RX ORDER — DIAZEPAM 5 MG
5 TABLET ORAL ONCE
Status: DISCONTINUED | OUTPATIENT
Start: 2020-06-16 | End: 2020-06-17 | Stop reason: HOSPADM

## 2020-06-16 RX ORDER — BUPIVACAINE HYDROCHLORIDE 2.5 MG/ML
INJECTION, SOLUTION EPIDURAL; INFILTRATION; INTRACAUDAL PRN
Status: DISCONTINUED | OUTPATIENT
Start: 2020-06-16 | End: 2020-06-16 | Stop reason: HOSPADM

## 2020-06-16 RX ORDER — LIDOCAINE HYDROCHLORIDE 10 MG/ML
INJECTION, SOLUTION EPIDURAL; INFILTRATION; INTRACAUDAL; PERINEURAL PRN
Status: DISCONTINUED | OUTPATIENT
Start: 2020-06-16 | End: 2020-06-16 | Stop reason: HOSPADM

## 2020-06-16 RX ORDER — METHYLPREDNISOLONE ACETATE 40 MG/ML
INJECTION, SUSPENSION INTRA-ARTICULAR; INTRALESIONAL; INTRAMUSCULAR; SOFT TISSUE PRN
Status: DISCONTINUED | OUTPATIENT
Start: 2020-06-16 | End: 2020-06-16 | Stop reason: HOSPADM

## 2020-06-16 RX ORDER — IOPAMIDOL 408 MG/ML
INJECTION, SOLUTION INTRATHECAL PRN
Status: DISCONTINUED | OUTPATIENT
Start: 2020-06-16 | End: 2020-06-16 | Stop reason: HOSPADM

## 2020-06-16 ASSESSMENT — MIFFLIN-ST. JEOR: SCORE: 1719.05

## 2020-06-16 NOTE — DISCHARGE INSTRUCTIONS
Home Care Instructions after a Sacroiliac Joint Injection        Activity  -You may resume most normal activity levels with the exception of strenuous activity. It is important for us to know if your pain with normal activity is relieved after this injection.  -DO NOT shower for 24 hours  -DO NOT remove bandaid for 24 hours    Pain  -You may experience soreness at the injection site for one or two days  -You may use an ice pack for 20 minutes every 2 hours for the first 24 hours  -You may use a heating pad after the first 24 hours  -You may use Tylenol (acetaminophen) every 4 hours or other pain medicines as directed by your physician    You may experience numbness radiating into your legs or arms (depending on the procedure location). This numbness may last several hours. Until sensation returns to normal; please use caution in walking, climbing stairs, and stepping out of your vehicle, etc.    DID YOU RECEIVE SEDATION TODAY?  No    If you received sedation please follow these additional safety measures.  Sedation medicine, if given, may remain active for many hours. It is important for the next 24 hours that you do not:  -Drive a car  -Operate machines or power tools  -Consume alcohol, including beer  -Sign any important papers or legal documents    DID YOU RECEIVE STEROIDS TODAY?  Yes / No    Common side effects of steroids:  Not everyone will experience corticosteroid side effects. If side effects are experienced, they will gradually subside in the 7-10 day period following an injection. Most common side effects include:  -Flushed face and/or chest  -Feeling of warmth, particularly in the face but could be an overall feeling of warmth  -Increased blood sugar in diabetic patients  -Menstrual irregularities my occur. If taking hormone-based birth control an alternate method of birth control is recommended  -Sleep disturbances and/or mood swings are possible  -Leg cramps      PLEASE KEEP TRACK OF YOUR SYMPTOMS AND  NOTE YOUR IMPROVEMENT FOR YOUR DOCTOR.     Please contact us if you have:  -Severe pain  -Fever more than 101.5 degrees Fahrenheit  -Signs of infection at the injection site (redness, swelling, or drainage)    If you have questions, please contact our office at [phone] between the hours of 7:00 am and 3:00 pm Monday through Friday. After office hours you can contact the on call provider by dialing [phone]. If you need immediate attention, we recommend that you go to a hospital emergency room or dial [phone].

## 2020-06-16 NOTE — H&P
Zayra Ramirez  [id number]  female  50 year old      Reason for procedure/surgery: SI joint injections with fluoroscopy    Patient Active Problem List   Diagnosis     Chronic midline low back pain     Facial cellulitis     Morbid (severe) obesity due to excess calories (H)     Dental abscess     Hypoxia     Subcutaneous emphysema (H)     Borderline personality disorder (H)     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)     Esophageal stricture     GERD (gastroesophageal reflux disease)     HTN (hypertension)     Interstitial lung disease (H)     Moderate persistent asthma without complication     Onychomycosis     Restless leg syndrome     Seasonal allergies     Smoker     Stress     Respiratory bronchiolitis interstitial lung disease (H)     Spinal epidural abscess     Lumbar spondylosis     Inflammatory arthritis     Arthralgia of temporomandibular joint     Articular disc disorder of temporomandibular joint     Derangement of temporomandibular joint     Calculus of gallbladder without cholecystitis without obstruction     Displacement of articular disc of temporomandibular joint with reduction     Myofascial pain     Oral lesion     Symptomatic cholelithiasis     Sacro-iliac pain       Past Surgical History:    Past Surgical History:   Procedure Laterality Date     CHOLECYSTECTOMY       COLONOSCOPY       COLONOSCOPY N/A 2/6/2020    Procedure: COLONOSCOPY, WITH POLYPECTOMY AND BIOPSY;  Surgeon: Julian Mccullough MD;  Location:  GI     ENT SURGERY       ESOPHAGOSCOPY, GASTROSCOPY, DUODENOSCOPY (EGD), COMBINED N/A 2/6/2020    Procedure: ESOPHAGOGASTRODUODENOSCOPY (EGD);  Surgeon: Julian Mccullough MD;  Location:  GI     EXCISE LESION INTRAORAL Bilateral 10/3/2018    Procedure: EXCISE LESION INTRAORAL;  Wide Local Excision Of of Left Oral Cavity Ulcer;  Surgeon: Morro Mijares MD;  Location:  OR      DRAIN SKIN ABSCESS SIMPLE/SINGLE  3/16/2012    Procedure:INCISION AND DRAINAGE,  ABSCESS, SIMPLE; Surgeon:CHRISTIANO HANCOCK; Location: GI     HEAD & NECK SURGERY       HYSTERECTOMY       INCISION AND DRAINAGE ABDOMEN WASHOUT, COMBINED       LAMINECTOMY THORACIC ONE LEVEL N/A 8/19/2019    Procedure: LAMINECTOMY, SPINE, THORACIC, 11-12 and Part of Lumbar 1, DRAINAGE OF EPIDURAL ABCESS, Epidural Drain Placement X 2;  Surgeon: Yadiel Beal MD;  Location: [facility] OR     RADIO FREQUENCY ABLATION / DESTRUCTION OF SACROILOAC JOINT DORSAL PRIMARY RAMUS Bilateral 12/17/2019    Procedure: Bilateral lumbar radiofrequency ablation with fluoroscopy and intravenous sedation ( Lumbar 2,3,4,5 medial branch nerves for the bilateral lumbar3-4, 4-5 and 5-sacral1 joints.;  Surgeon: Tram Florian MD;  Location: [facility]     spinal cord stimulator  08/08/2019     spinal cord stimulator removal  08/13/2019       Past Medical History:   Past Medical History:   Diagnosis Date     Allergic rhinitis      Anemia      Arthritis      Asthma     copd     Dental abscess 8-2015     Depressive disorder      Gastroesophageal reflux disease      History of emphysema      Hoarseness      Hypertension      Morbid obesity with BMI of 40.0-44.9, adult (H)      Obstructive sleep apnea      Respiratory bronchiolitis interstitial lung disease (H)      Sleep apnea        Social History:   Social History     Tobacco Use     Smoking status: Current Every Day Smoker     Packs/day: 1.00     Years: 30.00     Pack years: 30.00     Types: Cigarettes     Start date: 1/1/1996     Smokeless tobacco: Never Used   Substance Use Topics     Alcohol use: No     Binge frequency: Monthly       Family History:   Family History   Problem Relation Age of Onset     Other Cancer Father         base of tongue cancer at age ~65     Hypertension Father      Back Pain Father      Asthma Mother        Allergies:   Allergies   Allergen Reactions     Bee Venom Anaphylaxis     Bees      Doxycycline Anaphylaxis     Patient thinks it may have been just nausea  and vomiting, however unable to confirm     Erythromycin Anaphylaxis and Shortness Of Breath     Other reaction(s): Vomiting     Hydrocodone-Acetaminophen Itching       Active Medications:   Current Outpatient Medications   Medication Sig Dispense Refill     albuterol (PROAIR HFA, PROVENTIL HFA, VENTOLIN HFA) 108 (90 BASE) MCG/ACT inhaler Inhale 2 puffs into the lungs every 6 hours as needed for shortness of breath / dyspnea or wheezing (PT last dose 1.23.2020)        ARIPiprazole (ABILIFY) 15 MG tablet Take 15 mg by mouth At Bedtime        busPIRone HCl (BUSPAR) 30 MG tablet Take 15 mg by mouth At Bedtime       cholecalciferol ( ULTRA STRENGTH) 2000 units CAPS TAKE ONE CAPSULE BY MOUTH DAILY IN AM       cyclobenzaprine (FLEXERIL) 10 MG tablet Take 1 tablet (10 mg) by mouth 3 times daily (Patient taking differently: Take 10 mg by mouth At Bedtime ) 30 tablet 0     DULoxetine (CYMBALTA) 60 MG capsule Take 120 mg by mouth At Bedtime        EPINEPHrine (EPIPEN/ADRENACLICK/OR ANY BX GENERIC EQUIV) 0.3 MG/0.3ML injection 2-pack INJECT 0.3ML INTO THE MUSCLE ONCE AS NEEDED FOR ANAPHYLAXIS       ferrous sulfate (FEROSUL) 325 (65 Fe) MG tablet Take 325 mg by mouth At Bedtime   0     fluticasone-salmeterol (ADVAIR) 500-50 MCG/DOSE diskus inhaler Inhale 1 puff into the lungs 2 times daily        folic acid (FOLVITE) 1 MG tablet Take 1 tablet (1 mg) by mouth daily 90 tablet 3     lisinopril-hydrochlorothiazide (PRINZIDE/ZESTORETIC) 20-25 MG per tablet Take 1 tablet by mouth daily (Patient taking differently: Take 1 tablet by mouth every morning ) 30 tablet 0     methotrexate 2.5 MG tablet TAKE 9 TABLETS BY MOUTH ONCE WEEKLY 108 tablet 3     montelukast (SINGULAIR) 10 MG tablet Take 10 mg by mouth At Bedtime       naproxen (NAPROSYN) 500 MG tablet Take 2 tablets by mouth daily       omeprazole (PRILOSEC) 40 MG DR capsule Take 40 mg by mouth as needed (PT last dose appr 2 weeks ago)       oxyCODONE-acetaminophen (PERCOCET)  5-325 MG tablet Take 1 tablet by mouth every 8 hours as needed for severe pain 10 tablet 0     OXYGEN-HELIUM IN 2-3L PRN during the day, 3L @ night       pregabalin (LYRICA) 50 MG capsule Take 3 capsules (150 mg) by mouth At Bedtime Start 1 cap at bedtime 7 days, 2 cap at bedtime 7 days, 3 cap at bedtime 90 capsule 0     rOPINIRole (REQUIP) 2 MG tablet Take 2 mg by mouth nightly as needed        traMADol (ULTRAM) 50 MG tablet Take 1-2 tablets ( mg) by mouth every 8 hours as needed for moderate to severe pain 180 tablet 0     vitamin C 500 MG TABS Take 500 mg by mouth daily (with lunch)        zinc sulfate (ZINCATE) 220 (50 Zn) MG capsule Take 220 mg by mouth daily (with lunch)          Systemic Review:   CONSTITUTIONAL: NEGATIVE for fever, chills, change in weight  ENT/MOUTH: NEGATIVE for ear, mouth and throat problems  RESP: NEGATIVE for significant cough or SOB  CV: NEGATIVE for chest pain, palpitations or peripheral edema    Physical Examination:   Vital Signs: LMP  (LMP Unknown)   GENERAL: healthy, alert and no distress  NECK: no adenopathy, no asymmetry, masses, or scars  RESP: lungs clear to auscultation - no rales, rhonchi or wheezes  CV: regular rate and rhythm, normal S1 S2, no S3 or S4, no murmur, click or rub, no peripheral edema and peripheral pulses strong  ABDOMEN: soft, nontender, no hepatosplenomegaly, no masses and bowel sounds normal  MS: no gross musculoskeletal defects noted, no edema    Plan: Appropriate to proceed as scheduled.      Tram Florian MD  6/16/2020

## 2020-06-16 NOTE — PROCEDURES
PROCEDURE REPORT    Chief Complaint:  Back pain    Pre-operative Diagnosis:  1. Sacroiliitis, not elsewhere classified    Post-operative Diagnosis:  1. Sacroiliitis, not elsewhere classified    Procedure:  1. Bilateral sacroiliac joint injection  2. Fluoroscopy for needle guidance     Anesthesia:  Local anesthesia    Indications / Pre-Operative Plan:  The patient has disabling non radicular axial pain, and has failed a variety of conservative therapeutic interventions including activity modification, physical therapy, and medications.Examination is suggestive of sacroiliac joint dysfunction with focal tenderness and positive sacroiliac joint provocation testing. Further diagnostic and hopefully therapeutic sacroiliac joint block is deemed medically necessary. The risks, alternatives and benefits were explained to the patient in detail. The patient agreed with the plan.    Patient was examined by me prior to the procedure. Examination of heart, lung and mental status were all within acceptable limits. The patient has been assessed, examined and cleared for the planned procedure and level of anesthesia in an ambulatory surgery center.    Description of Procedure:  An additional intraoperative timeout, specifically to confirm accurate localization, was conducted by the attending physician. The patient remained awake and alert throughout the procedure. The patient was placed in the prone position. The skin was prepped with chlorhexidine and sterile drapes were applied. The skin and soft tissues were anesthetized with Lidocaine 1%. A 22 gauge 3.5 inch needle was inserted percutaneously and advanced under fluoroscopic guidance using dorsal oblique, AP, and lateral projections. The needle tip entered the right sacroiliac joint. No paraesthesias occurred and aspiration was negative Fluoroscopy was also used in multiple planes to ensure proper and safe placement of the needle. Isovue 200 M was slowly injected to confirm  intra-articular spread and lack of intravascular spread. The needle was injected with 20 mg methylprednisolone (40 mg/mL) and bupivacaine (0.25%) 1 mL and removed. The procedure was repeated in the same manner on the left side. A sterile bandage was applied. The patient did not experience any hemodynamic or neurologic sequelae. The patient was transferred to the recovery area. Post operative instructions were explained and given to the patient. The patient was discharged in stable health and given follow up instructions.    Post-Operative Plan:  The patient will monitor pain relief from today's procedure. They will call the clinic for any problems related to today's procedure. A copy of our written post-procedure instructions was provided. They will return to clinic as scheduled.      Current Pain Score 6/16/2020 6/16/2020 12/17/2019 12/17/2019 8/22/2019 8/22/2019 8/21/2019   Patient currently in pain? denies yes denies yes yes yes yes   Pain score (0-10) 0 5 0 3 - - -   Pain location - - - - - - -   Pain descriptors - - - - - - -

## 2020-07-06 NOTE — PLAN OF CARE
Discharge Planner OT   Patient plan for discharge: Home  Current status: Pt demonstrated understanding of precautions related to dressing following education by therapist during session.  Pt also standing at sink to complete grooming and hygiene tasks  Barriers to return to prior living situation: Post surgical precautions, pain  Recommendations for discharge: Home with assist  Rationale for recommendations: Pt may benefit from assistance with heavy ADL and IADL       Entered by: Rigo Carrasco 08/20/2019 1:54 PM        Faustino presents for evaluation of his throat.  He is here for evaluation because he has chronic irritation with a sensation of a foreign body any points toward the region of his larynx.  He also has an odd chronic dry cough.  He denies any fever, chills, or acute respiratory infectious symptoms.  He is not having any significant dysphagia or odynophagia at this time.  He is sent for consultation by Dr. Arriaga.  Interestingly he was recently placed on lisinopril in April.    I have reviewed the patient's medications and allergies, past medical, surgical, social and family history, updating these as appropriate.  See Histories section of the EMR for a display of this information.   The patient has no ear pain and all other ROS are negative.   Complete 10 point review of systems was performed and is negative.    Physical Examination:  Faustino is a 55 year old male in no acute distress.  Skin color and turgor are normal with no obvious lesions.  The patient is well-developed and appears well-nourished.  The patient is oriented x3 without obvious neurological abnormality.  Cranial nerves II through XII are intact without deficit.  External ears are normal without lesion.  Ear canals have no wax, lesions or debris.  Tympanic membranes are well-developed without sign of fluid or infection.  Oral cavity shows no lesions, tumors or growths.  Oropharynx shows no lesions, gross tumors or exudate.  Neck is supple with no lateral adenopathy palpable.  Thyroid gland is normal without mass  Salivary glands are nontender with no palpable mass, lesions or growth.  Facial exam shows no weakness or tenderness.   Anterior nose is patent with no lesion or discharge.   No polyps, growths or tumors are noted.  Nasal septal examination reveals straight septum without obstructive pathology.    Assessment:  Chronic cough  Globus sensation of throat  Symptoms compatible with chronic reflux disease    Plan:  I now recommend laryngoscopy.  We will be  getting the patient COVID tested and seeing him back after testing for laryngoscopy.  He will call if I can be of assistance prior to his laryngoscopy.  After his laryngoscopy we will be discussing treatment including probable discussion with his other physicians to discontinue his lisinopril.

## 2020-07-21 ENCOUNTER — VIRTUAL VISIT (OUTPATIENT)
Dept: ANESTHESIOLOGY | Facility: CLINIC | Age: 50
End: 2020-07-21
Payer: COMMERCIAL

## 2020-07-21 DIAGNOSIS — M48.061 DEGENERATIVE LUMBAR SPINAL STENOSIS: Primary | ICD-10-CM

## 2020-07-21 ASSESSMENT — PAIN SCALES - GENERAL: PAINLEVEL: MILD PAIN (2)

## 2020-07-21 NOTE — PATIENT INSTRUCTIONS
Medications:    Continue Pregabalin and Tramadol as prescribed.     When calling in for refills for your opioid medication- You MUST call (Or MyChart) the clinic DIRECTLY and at least 7 days before you are needing your medication refilled.    Procedures:    Please call to schedule your procedure: [phone]    Lumbar Epidural Steroid Injection.     We have included your Pre-Procedure Instructions below. Please review and contact the clinic if you have questions.         Recommended Follow up:  4 weeks after the procedure with Dr. Florian.        Please call [phone] to schedule this appointment if you don't already have an appointment made.     Procedure Information related to COVID-19    You will need to be tested for COVID-19 within 72 hours before your procedure.   Our Facility has a testing site located across the street from the [facility]  (Located at:  [address] [place] [id number])   Please note: You will only be contacted for positive and pending results.   Please be aware that the turn around time for the test is approximately 24-48 hours, and there is a chance that results will not be back in time for you to have your procedure. Should your results still be pending the day of your procedure, you will be notified as soon as possible, as your appointment will be cancelled and you will need to be rescheduled.     Patients will need to be dropped off at the surgery entrance (on the corner of [address] and [address]   Drivers/visitors will not be allowed in the building-NO EXCEPTIONS. Patients will need to provide contact information and make/model of 's car when checking in for procedure.     Please contact the clinic if you have further questions about this information.       Information related to Scheduling and Pre-Procedure Instructions:      Please call [phone] to schedule, reschedule, or cancel your procedure appointment.   Phones are answered Monday - Friday from 08:00 -  4:30pm.  Leave a voicemail with your name, birth date, and phone number if no one is available to take your call.     Your procedure: Lumbar Epidural Steroid Injection.       If you are having a Diagnostic procedure, you will be given a Pain Diary to document your pain relief.   Please fax the completed form to our office.   fax number is [phone]    If you must reschedule your procedure more than two times, you must follow up in clinic before rescheduling again.        Preparing for your procedure    CAUTION - FAILURE TO FOLLOW THESE PRE-PROCEDURE INSTRUCTIONS WILL RESULT IN YOUR PROCEDURE BEING RESCHEDULED.            You must have a  take you home after your procedure. Transportation by taxi or para-transit is okay as long as you have a responsible adult accompany you. You must provide your 's full name and contact number at time of check in.     Fasting Protocol Please have NOTHING TO EAT OR DRINK 2 HOURS  prior to arrival at the procedure area.     .   Medications If you take any medications, DO NOT STOP. Take your medications as usual the day of your procedure with a sip of water AT LEAST 2 HOURS PRIOR TO ARRIVAL.    Antibiotics If you are currently taking antibiotics, you must complete the entire dose 7 days prior to your scheduled procedure. You must be clear of any signs or symptoms of infection. If you begin antibiotics, please contact our clinic for instructions.     Fever, Chills, or Rash If you experience a fever of higher than 100 degrees, chills, rash, or open wounds during the one week before your procedure, please call the clinic to see if you may proceed with your procedure.      Medication Hold List  **Patients under Cardiology/Neurology care should consult their provider prior to the pain procedure to verify pre-procedure medication instructions. The information below contains general guidelines.**    Blood Thinners If you are taking daily ASPIRIN, PLAVIX, OR OTHER BLOOD  THINNERS SUCH AS COUMADIN/WARFARIN, we will need your prescribing doctor to sign a release permitting you to stop these medications. Once approved by your prescribing doctor - STOP ALL BLOOD THINNERS BASED ON THE TIME TABLE BELOW PRIOR TO YOUR PROCEDURE. If you have been instructed to stop WARFARIN(COUMADIN), you must have an INR lab drawn the day before your procedure. . Your INR must be within normal limits before we can perform your injection. MEDICATIONS CAN BE RESTARTED AFTER YOUR PROCEDURE.    14 DAY HOLD  Ticlid (ticlopidine)    10 DAY HOLD  Effient (Prasugel)    3 DAY HOLD  Xarelto (rivaroxaban) 7 DAY HOLD  Anacin, Bufferin, Ecotrin, Excedrin, Aggrenox (Aspirin)  Brilinta (ticagrelor)  Coumadin (Warfarin)  Pradexa (Dabigatran)  Elmiron (Pentosan)  Plavix (Clopidogrel Bisulfate)  Pletal (Cilostazol)    24 HOUR HOLD  Lovenox (enoxaparin)  Agrylin (Anagrelide)                To speak with a nurse, schedule/reschedule/cancel a clinic appointment, or request a medication refill call: [phone]     You can also reach us by Cell[employer]: [email or web address]

## 2020-07-21 NOTE — LETTER
"7/21/2020       RE: Zayra Ramirez  [address] [place] [address]     Dear Colleague,    Thank you for referring your patient, Zayra Ramirez, to the [facility] FOR COMPREHENSIVE PAIN MANAGEMENT at [facility]. Please see a copy of my visit note below.    Zayra Ramirez is a 50 year old female who is being evaluated via a billable video visit.      The patient has been notified of following:     \"This video visit will be conducted via a call between you and your physician/provider. We have found that certain health care needs can be provided without the need for an in-person physical exam.  This service lets us provide the care you need with a video conversation.  If a prescription is necessary we can send it directly to your pharmacy.  If lab work is needed we can place an order for that and you can then stop by our lab to have the test done at a later time.    Video visits are billed at different rates depending on your insurance coverage.  Please reach out to your insurance provider with any questions.    If during the course of the call the physician/provider feels a video visit is not appropriate, you will not be charged for this service.\"    Patient has given verbal consent for Video visit? Yes  How would you like to obtain your AVS? MyChart  If you are dropped from the video visit, the video invite should be resent to: Text : [phone]    Will anyone else be joining your video visit? Regina Escamilla CMA            Zayra Ramirez is a 50 year old female who is being evaluated via a billable video visit.      The patient has been notified of following:     \"This video visit will be conducted via a call between you and your physician/provider. We have found that certain health care needs can be provided without the need for an in-person physical exam.  This service lets us provide the care you need with a video conversation.  If a " "prescription is necessary we can send it directly to your pharmacy.  If lab work is needed we can place an order for that and you can then stop by our lab to have the test done at a later time.    Video visits are billed at different rates depending on your insurance coverage.  Please reach out to your insurance provider with any questions.    If during the course of the call the physician/provider feels a video visit is not appropriate, you will not be charged for this service.\"    Patient has given verbal consent for Video visit? Yes        Video-Visit Details    Type of service:  Video Visit    Video Start Time: 11:04 AM  Video End Time: 11:21 AM    Originating Location (pt. Location): Home    Distant Location (provider location):  [facility] FOR COMPREHENSIVE PAIN MANAGEMENT     Platform used for Video Visit: Rosey Florian MD    Pain clinic follow up note    HPI:   Zayra Ramirez is a 50 year old year old female  who presents to the pain clinic with low back and buttock pain, s/p SI joint injection    Patient Supplied Answers To the [facility] Pain Questionnaire  [facility] -  Patient Entered Questionnaire/Answers 4/21/2020   What number best describes your pain right now:  0 = No pain  to  10 = Worst pain imaginable 3   How would you describe the pain? dull, aching   Which of the following worsen your pain? lying down, standing, sitting, walking, exercise, coughing / sneezing, urination, bowel movements   Which of the following improve or reduce your pain?  medication, nothing relieves the pain   What number best describes your average pain for the past week:  0 = No pain  to  10 = Worst pain imaginable 4   What number best describes your LOWEST pain in past 24 hours:  0 = No pain  to  10 = Worst pain imaginable 2   What number best describes your WORST pain in past 24 hours:  0 = No pain  to  10 = Worst pain imaginable 5   When is your pain worst? Constant   What non-medicine treatments have you already " had for your pain? acupuncture, exercise   Have you tried treating your pain with medication?  Yes   Are you currently taking medications for your pain? Yes     Ms. Ramirez, 50 years old female presents to the pain clinic via video visit for a follow-up after bilateral SI joint injections.  The patient states complete recovery of her lower back pain on the left side.  She works as a  and return to work and upon returning to work she noticed return of pain in the right buttock area.  The patient describes the pain starts in her low back and radiates to the right buttock area which is excruciating.    The patient states that she has not found any relief and the right buttock area and it is extremely limiting her activities      ROS:  Review of Systems   All other systems reviewed and are negative.        Significant Medical History:   Past Medical History:   Diagnosis Date     Allergic rhinitis      Anemia      Arthritis      Asthma     copd     Dental abscess 8-2015     Depressive disorder      Gastroesophageal reflux disease      History of emphysema      Hoarseness      Hypertension      Morbid obesity with BMI of 40.0-44.9, adult (H)      Obstructive sleep apnea      Respiratory bronchiolitis interstitial lung disease (H)      Sleep apnea           Past Surgical History:  Past Surgical History:   Procedure Laterality Date     CHOLECYSTECTOMY       COLONOSCOPY       COLONOSCOPY N/A 2/6/2020    Procedure: COLONOSCOPY, WITH POLYPECTOMY AND BIOPSY;  Surgeon: Julian Mccullough MD;  Location:  GI     ENT SURGERY       ESOPHAGOSCOPY, GASTROSCOPY, DUODENOSCOPY (EGD), COMBINED N/A 2/6/2020    Procedure: ESOPHAGOGASTRODUODENOSCOPY (EGD);  Surgeon: Julian Mccullough MD;  Location:  GI     EXCISE LESION INTRAORAL Bilateral 10/3/2018    Procedure: EXCISE LESION INTRAORAL;  Wide Local Excision Of of Left Oral Cavity Ulcer;  Surgeon: Morro Mijares MD;  [facility]      DRAIN SKIN ABSCESS  SIMPLE/SINGLE  3/16/2012    Procedure:INCISION AND DRAINAGE, ABSCESS, SIMPLE; Surgeon:CHRISTIANO HANCOCK; Location: GI     HEAD & NECK SURGERY       HYSTERECTOMY       INCISION AND DRAINAGE ABDOMEN WASHOUT, COMBINED       INJECT SACROILIAC JOINT Bilateral 6/16/2020    Procedure: Bilateral sacroiliac joint steroid injection with fluoroscopy;  Surgeon: Tram Florian MD;  [facility]     LAMINECTOMY THORACIC ONE LEVEL N/A 8/19/2019    Procedure: LAMINECTOMY, SPINE, THORACIC, 11-12 and Part of Lumbar 1, DRAINAGE OF EPIDURAL ABCESS, Epidural Drain Placement X 2;  Surgeon: Yadiel Beal MD;  [facility]     RADIO FREQUENCY ABLATION / DESTRUCTION OF SACROILOAC JOINT DORSAL PRIMARY RAMUS Bilateral 12/17/2019    Procedure: Bilateral lumbar radiofrequency ablation with fluoroscopy and intravenous sedation ( Lumbar 2,3,4,5 medial branch nerves for the bilateral lumbar3-4, 4-5 and 5-sacral1 joints.;  Surgeon: Tram Florian MD;  [facility]     spinal cord stimulator  08/08/2019     spinal cord stimulator removal  08/13/2019          Family History:  Family History   Problem Relation Age of Onset     Other Cancer Father         base of tongue cancer at age ~65     Hypertension Father      Back Pain Father      Asthma Mother           Social History:  Social History     Socioeconomic History     Marital status: Single     Spouse name: Not on file     Number of children: Not on file     Years of education: Not on file     Highest education level: Not on file   Occupational History     Not on file   Social Needs     Financial resource strain: Not on file     Food insecurity     Worry: Not on file     Inability: Not on file     Transportation needs     Medical: Not on file     Non-medical: Not on file   Tobacco Use     Smoking status: Current Every Day Smoker     Packs/day: 1.00     Years: 30.00     Pack years: 30.00     Types: Cigarettes     Start date: 1/1/1996     Smokeless tobacco: Never Used    Substance and Sexual Activity     Alcohol use: No     Binge frequency: Monthly     Drug use: Yes     Types: Marijuana     Comment: very rarely      Sexual activity: Never   Lifestyle     Physical activity     Days per week: Not on file     Minutes per session: Not on file     Stress: Not on file   Relationships     Social connections     Talks on phone: Not on file     Gets together: Not on file     Attends Sikhism service: Not on file     Active member of club or organization: Not on file     Attends meetings of clubs or organizations: Not on file     Relationship status: Not on file     Intimate partner violence     Fear of current or ex partner: Not on file     Emotionally abused: Not on file     Physically abused: Not on file     Forced sexual activity: Not on file   Other Topics Concern     Parent/sibling w/ CABG, MI or angioplasty before 65F 55M? Not Asked   Social History Narrative     Not on file     Social History     Social History Narrative     Not on file          Allergies:  Allergies   Allergen Reactions     Bee Venom Anaphylaxis     Bees      Doxycycline Anaphylaxis     Patient thinks it may have been just nausea and vomiting, however unable to confirm     Erythromycin Anaphylaxis and Shortness Of Breath     Other reaction(s): Vomiting     Hydrocodone-Acetaminophen Itching       Current Medications:   Current Outpatient Medications   Medication Sig Dispense Refill     albuterol (PROAIR HFA, PROVENTIL HFA, VENTOLIN HFA) 108 (90 BASE) MCG/ACT inhaler Inhale 2 puffs into the lungs every 6 hours as needed for shortness of breath / dyspnea or wheezing (PT last dose 1.23.2020)        ARIPiprazole (ABILIFY) 15 MG tablet Take 15 mg by mouth At Bedtime        busPIRone HCl (BUSPAR) 30 MG tablet Take 15 mg by mouth At Bedtime       cholecalciferol ( ULTRA STRENGTH) 2000 units CAPS TAKE ONE CAPSULE BY MOUTH DAILY IN AM       cyclobenzaprine (FLEXERIL) 10 MG tablet Take 1 tablet (10 mg) by mouth 3 times  daily (Patient taking differently: Take 10 mg by mouth At Bedtime ) 30 tablet 0     DULoxetine (CYMBALTA) 60 MG capsule Take 120 mg by mouth At Bedtime        EPINEPHrine (EPIPEN/ADRENACLICK/OR ANY BX GENERIC EQUIV) 0.3 MG/0.3ML injection 2-pack INJECT 0.3ML INTO THE MUSCLE ONCE AS NEEDED FOR ANAPHYLAXIS       ferrous sulfate (FEROSUL) 325 (65 Fe) MG tablet Take 325 mg by mouth At Bedtime   0     fluticasone-salmeterol (ADVAIR) 500-50 MCG/DOSE diskus inhaler Inhale 1 puff into the lungs 2 times daily        folic acid (FOLVITE) 1 MG tablet Take 1 tablet (1 mg) by mouth daily 90 tablet 3     lisinopril-hydrochlorothiazide (PRINZIDE/ZESTORETIC) 20-25 MG per tablet Take 1 tablet by mouth daily (Patient taking differently: Take 1 tablet by mouth every morning ) 30 tablet 0     methotrexate 2.5 MG tablet TAKE 9 TABLETS BY MOUTH ONCE WEEKLY 108 tablet 3     montelukast (SINGULAIR) 10 MG tablet Take 10 mg by mouth At Bedtime       omeprazole (PRILOSEC) 40 MG DR capsule Take 40 mg by mouth as needed (PT last dose appr 2 weeks ago)       OXYGEN-HELIUM IN 2-3L PRN during the day, 3L @ night       rOPINIRole (REQUIP) 2 MG tablet Take 2 mg by mouth nightly as needed        traMADol (ULTRAM) 50 MG tablet Take 1-2 tablets ( mg) by mouth every 8 hours as needed for moderate to severe pain 180 tablet 0     vitamin C 500 MG TABS Take 500 mg by mouth daily (with lunch)        zinc sulfate (ZINCATE) 220 (50 Zn) MG capsule Take 220 mg by mouth daily (with lunch)        pregabalin (LYRICA) 50 MG capsule Take 3 capsules (150 mg) by mouth At Bedtime Start 1 cap at bedtime 7 days, 2 cap at bedtime 7 days, 3 cap at bedtime (Patient not taking: Reported on 7/21/2020) 90 capsule 0             Physical Exam:     LMP  (LMP Unknown)     General Appearance: No distress, seated comfortably  Mood: Euthymic  HE ENT: Non constricted pupils  Respiratory: Non labored breathing    Skin: No rashes over exposed skin          ASSESSMENT AND PLAN:      Encounter Diagnosis:  1.  Right L4 lumbar radiculopathy    2.  Lumbar spondylosis  3. Sacroiliac dysfunction, right  4. S/P epidural abscess with laminectomy and drainage  5. Lateral femoral cutaneous nerve entrapment right       Zayra Ramirez is a 50 year old year old female  who presents to the pain clinic with back pain radiating to the buttock status post SI joint injections which completely resolved pain in the left buttock however she has return of pain radiating to the right buttock which could be a component of her L4 radicular pain    RECOMMENDATIONS:     1. Medications: We will continue pregabalin and tramadol as prescribed    2. Procedure: We are scheduling the patient for L5-S1 epidural steroid injection with fluoroscopy in the procedure suite. Risks/benefits/alternatives were discussed.       Follow up: 4 weeks after injection                    LPN reviewed pre-procedure instructions with the pt.   Pt was informed that they will need to call to schedule their follow up appointment 4 weeks after their procedure. Pt informed of needing a Covid Test, and that they will be getting a call several days before the procedure from a Procedure nurse to review instructions.   Pt denied being on blood thinners.     Pt given scheduling phone number.   AVS mailed to pt- per their request.   Doreen Colbert LPN

## 2020-07-21 NOTE — PROGRESS NOTES
"Zayra Ramirez is a 50 year old female who is being evaluated via a billable video visit.      The patient has been notified of following:     \"This video visit will be conducted via a call between you and your physician/provider. We have found that certain health care needs can be provided without the need for an in-person physical exam.  This service lets us provide the care you need with a video conversation.  If a prescription is necessary we can send it directly to your pharmacy.  If lab work is needed we can place an order for that and you can then stop by our lab to have the test done at a later time.    Video visits are billed at different rates depending on your insurance coverage.  Please reach out to your insurance provider with any questions.    If during the course of the call the physician/provider feels a video visit is not appropriate, you will not be charged for this service.\"    Patient has given verbal consent for Video visit? Yes        Video-Visit Details    Type of service:  Video Visit    Video Start Time: 11:04 AM  Video End Time: 11:21 AM    Originating Location (pt. Location): Home    Distant Location (provider location):  [email or web address] FOR COMPREHENSIVE PAIN MANAGEMENT     Platform used for Video Visit: Rosey Florian MD    Pain clinic follow up note    HPI:   Zayra Ramirez is a 50 year old year old female  who presents to the pain clinic with low back and buttock pain, s/p SI joint injection    Patient Supplied Answers To the [facility] Pain Questionnaire  [facility] Pain -  Patient Entered Questionnaire/Answers 4/21/2020   What number best describes your pain right now:  0 = No pain  to  10 = Worst pain imaginable 3   How would you describe the pain? dull, aching   Which of the following worsen your pain? lying down, standing, sitting, walking, exercise, coughing / sneezing, urination, bowel movements   Which of the following improve or reduce your pain?  medication, nothing relieves the " pain   What number best describes your average pain for the past week:  0 = No pain  to  10 = Worst pain imaginable 4   What number best describes your LOWEST pain in past 24 hours:  0 = No pain  to  10 = Worst pain imaginable 2   What number best describes your WORST pain in past 24 hours:  0 = No pain  to  10 = Worst pain imaginable 5   When is your pain worst? Constant   What non-medicine treatments have you already had for your pain? acupuncture, exercise   Have you tried treating your pain with medication?  Yes   Are you currently taking medications for your pain? Yes     Ms. Ramirez, 50 years old female presents to the pain clinic via video visit for a follow-up after bilateral SI joint injections.  The patient states complete recovery of her lower back pain on the left side.  She works as a  and return to work and upon returning to work she noticed return of pain in the right buttock area.  The patient describes the pain starts in her low back and radiates to the right buttock area which is excruciating.    The patient states that she has not found any relief and the right buttock area and it is extremely limiting her activities      ROS:  Review of Systems   All other systems reviewed and are negative.        Significant Medical History:   Past Medical History:   Diagnosis Date     Allergic rhinitis      Anemia      Arthritis      Asthma     copd     Dental abscess 8-2015     Depressive disorder      Gastroesophageal reflux disease      History of emphysema      Hoarseness      Hypertension      Morbid obesity with BMI of 40.0-44.9, adult (H)      Obstructive sleep apnea      Respiratory bronchiolitis interstitial lung disease (H)      Sleep apnea           Past Surgical History:  Past Surgical History:   Procedure Laterality Date     CHOLECYSTECTOMY       COLONOSCOPY       COLONOSCOPY N/A 2/6/2020    Procedure: COLONOSCOPY, WITH POLYPECTOMY AND BIOPSY;  Surgeon: Julian Mccullough MD;  Location:  [facility] GI     ENT SURGERY       ESOPHAGOSCOPY, GASTROSCOPY, DUODENOSCOPY (EGD), COMBINED N/A 2/6/2020    Procedure: ESOPHAGOGASTRODUODENOSCOPY (EGD);  Surgeon: Julian Mccullough MD;  Location:  GI     EXCISE LESION INTRAORAL Bilateral 10/3/2018    Procedure: EXCISE LESION INTRAORAL;  Wide Local Excision Of of Left Oral Cavity Ulcer;  Surgeon: Morro Mijares MD;  [facility]: [facility] OR     HC DRAIN SKIN ABSCESS SIMPLE/SINGLE  3/16/2012    Procedure:INCISION AND DRAINAGE, ABSCESS, SIMPLE; Surgeon:CHRISTIANO HANCOCK; Location: GI     HEAD & NECK SURGERY       HYSTERECTOMY       INCISION AND DRAINAGE ABDOMEN WASHOUT, COMBINED       INJECT SACROILIAC JOINT Bilateral 6/16/2020    Procedure: Bilateral sacroiliac joint steroid injection with fluoroscopy;  Surgeon: Tram Florian MD;  [facility]     LAMINECTOMY THORACIC ONE LEVEL N/A 8/19/2019    Procedure: LAMINECTOMY, SPINE, THORACIC, 11-12 and Part of Lumbar 1, DRAINAGE OF EPIDURAL ABCESS, Epidural Drain Placement X 2;  Surgeon: Yadiel Beal MD;  [facility] OR     RADIO FREQUENCY ABLATION / DESTRUCTION OF SACROILOAC JOINT DORSAL PRIMARY RAMUS Bilateral 12/17/2019    Procedure: Bilateral lumbar radiofrequency ablation with fluoroscopy and intravenous sedation ( Lumbar 2,3,4,5 medial branch nerves for the bilateral lumbar3-4, 4-5 and 5-sacral1 joints.;  Surgeon: Tram Florian MD;  [facility]     spinal cord stimulator  08/08/2019     spinal cord stimulator removal  08/13/2019          Family History:  Family History   Problem Relation Age of Onset     Other Cancer Father         base of tongue cancer at age ~65     Hypertension Father      Back Pain Father      Asthma Mother           Social History:  Social History     Socioeconomic History     Marital status: Single     Spouse name: Not on file     Number of children: Not on file     Years of education: Not on file     Highest education level: Not on file   Occupational History     Not on  file   Social Needs     Financial resource strain: Not on file     Food insecurity     Worry: Not on file     Inability: Not on file     Transportation needs     Medical: Not on file     Non-medical: Not on file   Tobacco Use     Smoking status: Current Every Day Smoker     Packs/day: 1.00     Years: 30.00     Pack years: 30.00     Types: Cigarettes     Start date: 1/1/1996     Smokeless tobacco: Never Used   Substance and Sexual Activity     Alcohol use: No     Binge frequency: Monthly     Drug use: Yes     Types: Marijuana     Comment: very rarely      Sexual activity: Never   Lifestyle     Physical activity     Days per week: Not on file     Minutes per session: Not on file     Stress: Not on file   Relationships     Social connections     Talks on phone: Not on file     Gets together: Not on file     Attends Church service: Not on file     Active member of club or organization: Not on file     Attends meetings of clubs or organizations: Not on file     Relationship status: Not on file     Intimate partner violence     Fear of current or ex partner: Not on file     Emotionally abused: Not on file     Physically abused: Not on file     Forced sexual activity: Not on file   Other Topics Concern     Parent/sibling w/ CABG, MI or angioplasty before 65F 55M? Not Asked   Social History Narrative     Not on file     Social History     Social History Narrative     Not on file          Allergies:  Allergies   Allergen Reactions     Bee Venom Anaphylaxis     Bees      Doxycycline Anaphylaxis     Patient thinks it may have been just nausea and vomiting, however unable to confirm     Erythromycin Anaphylaxis and Shortness Of Breath     Other reaction(s): Vomiting     Hydrocodone-Acetaminophen Itching       Current Medications:   Current Outpatient Medications   Medication Sig Dispense Refill     albuterol (PROAIR HFA, PROVENTIL HFA, VENTOLIN HFA) 108 (90 BASE) MCG/ACT inhaler Inhale 2 puffs into the lungs every 6 hours as  needed for shortness of breath / dyspnea or wheezing (PT last dose 1.23.2020)        ARIPiprazole (ABILIFY) 15 MG tablet Take 15 mg by mouth At Bedtime        busPIRone HCl (BUSPAR) 30 MG tablet Take 15 mg by mouth At Bedtime       cholecalciferol ( ULTRA STRENGTH) 2000 units CAPS TAKE ONE CAPSULE BY MOUTH DAILY IN AM       cyclobenzaprine (FLEXERIL) 10 MG tablet Take 1 tablet (10 mg) by mouth 3 times daily (Patient taking differently: Take 10 mg by mouth At Bedtime ) 30 tablet 0     DULoxetine (CYMBALTA) 60 MG capsule Take 120 mg by mouth At Bedtime        EPINEPHrine (EPIPEN/ADRENACLICK/OR ANY BX GENERIC EQUIV) 0.3 MG/0.3ML injection 2-pack INJECT 0.3ML INTO THE MUSCLE ONCE AS NEEDED FOR ANAPHYLAXIS       ferrous sulfate (FEROSUL) 325 (65 Fe) MG tablet Take 325 mg by mouth At Bedtime   0     fluticasone-salmeterol (ADVAIR) 500-50 MCG/DOSE diskus inhaler Inhale 1 puff into the lungs 2 times daily        folic acid (FOLVITE) 1 MG tablet Take 1 tablet (1 mg) by mouth daily 90 tablet 3     lisinopril-hydrochlorothiazide (PRINZIDE/ZESTORETIC) 20-25 MG per tablet Take 1 tablet by mouth daily (Patient taking differently: Take 1 tablet by mouth every morning ) 30 tablet 0     methotrexate 2.5 MG tablet TAKE 9 TABLETS BY MOUTH ONCE WEEKLY 108 tablet 3     montelukast (SINGULAIR) 10 MG tablet Take 10 mg by mouth At Bedtime       omeprazole (PRILOSEC) 40 MG DR capsule Take 40 mg by mouth as needed (PT last dose appr 2 weeks ago)       OXYGEN-HELIUM IN 2-3L PRN during the day, 3L @ night       rOPINIRole (REQUIP) 2 MG tablet Take 2 mg by mouth nightly as needed        traMADol (ULTRAM) 50 MG tablet Take 1-2 tablets ( mg) by mouth every 8 hours as needed for moderate to severe pain 180 tablet 0     vitamin C 500 MG TABS Take 500 mg by mouth daily (with lunch)        zinc sulfate (ZINCATE) 220 (50 Zn) MG capsule Take 220 mg by mouth daily (with lunch)        pregabalin (LYRICA) 50 MG capsule Take 3 capsules (150 mg)  by mouth At Bedtime Start 1 cap at bedtime 7 days, 2 cap at bedtime 7 days, 3 cap at bedtime (Patient not taking: Reported on 7/21/2020) 90 capsule 0             Physical Exam:     LMP  (LMP Unknown)     General Appearance: No distress, seated comfortably  Mood: Euthymic  HE ENT: Non constricted pupils  Respiratory: Non labored breathing    Skin: No rashes over exposed skin          ASSESSMENT AND PLAN:     Encounter Diagnosis:  1.  Right L4 lumbar radiculopathy    2.  Lumbar spondylosis  3. Sacroiliac dysfunction, right  4. S/P epidural abscess with laminectomy and drainage  5. Lateral femoral cutaneous nerve entrapment right       Zayra Ramirez is a 50 year old year old female  who presents to the pain clinic with back pain radiating to the buttock status post SI joint injections which completely resolved pain in the left buttock however she has return of pain radiating to the right buttock which could be a component of her L4 radicular pain    RECOMMENDATIONS:     1. Medications: We will continue pregabalin and tramadol as prescribed    2. Procedure: We are scheduling the patient for L5-S1 epidural steroid injection with fluoroscopy in the procedure suite. Risks/benefits/alternatives were discussed.       Follow up: 4 weeks after injection

## 2020-07-21 NOTE — PROGRESS NOTES
"Zayra Ramirez is a 50 year old female who is being evaluated via a billable video visit.      The patient has been notified of following:     \"This video visit will be conducted via a call between you and your physician/provider. We have found that certain health care needs can be provided without the need for an in-person physical exam.  This service lets us provide the care you need with a video conversation.  If a prescription is necessary we can send it directly to your pharmacy.  If lab work is needed we can place an order for that and you can then stop by our lab to have the test done at a later time.    Video visits are billed at different rates depending on your insurance coverage.  Please reach out to your insurance provider with any questions.    If during the course of the call the physician/provider feels a video visit is not appropriate, you will not be charged for this service.\"    Patient has given verbal consent for Video visit? Yes  How would you like to obtain your AVS? MyChart  If you are dropped from the video visit, the video invite should be resent to: Text : [phone]    Will anyone else be joining your video visit? No          Indira Escamilla CMA          "

## 2020-07-22 NOTE — PROGRESS NOTES
LPN reviewed pre-procedure instructions with the pt.   Pt was informed that they will need to call to schedule their follow up appointment 4 weeks after their procedure. Pt informed of needing a Covid Test, and that they will be getting a call several days before the procedure from a Procedure nurse to review instructions.   Pt denied being on blood thinners.     Pt given scheduling phone number.   AVS mailed to pt- per their request.   Doreen Colbert LPN

## 2020-08-31 DIAGNOSIS — Z11.59 ENCOUNTER FOR SCREENING FOR OTHER VIRAL DISEASES: Primary | ICD-10-CM

## 2020-08-31 PROBLEM — M48.061 DEGENERATIVE LUMBAR SPINAL STENOSIS: Status: ACTIVE | Noted: 2020-08-31

## 2020-09-11 DIAGNOSIS — Z11.59 ENCOUNTER FOR SCREENING FOR OTHER VIRAL DISEASES: ICD-10-CM

## 2020-09-11 PROCEDURE — U0003 INFECTIOUS AGENT DETECTION BY NUCLEIC ACID (DNA OR RNA); SEVERE ACUTE RESPIRATORY SYNDROME CORONAVIRUS 2 (SARS-COV-2) (CORONAVIRUS DISEASE [COVID-19]), AMPLIFIED PROBE TECHNIQUE, MAKING USE OF HIGH THROUGHPUT TECHNOLOGIES AS DESCRIBED BY CMS-2020-01-R: HCPCS | Performed by: ANESTHESIOLOGY

## 2020-09-11 NOTE — PROGRESS NOTES
COVID-19 PCR test completed. Patient handout For Patients Who Have Been Tested for Covid-19 (Coronavirus) was given to the patient, which includes test result notification process.

## 2020-09-12 LAB
SARS-COV-2 RNA SPEC QL NAA+PROBE: NOT DETECTED
SPECIMEN SOURCE: NORMAL

## 2020-09-15 ENCOUNTER — ANCILLARY PROCEDURE (OUTPATIENT)
Dept: RADIOLOGY | Facility: AMBULATORY SURGERY CENTER | Age: 50
End: 2020-09-15
Attending: ANESTHESIOLOGY

## 2020-09-15 ENCOUNTER — VIRTUAL VISIT (OUTPATIENT)
Dept: INTERNAL MEDICINE | Facility: CLINIC | Age: 50
End: 2020-09-15

## 2020-09-15 ENCOUNTER — HOSPITAL ENCOUNTER (OUTPATIENT)
Facility: AMBULATORY SURGERY CENTER | Age: 50
End: 2020-09-15
Attending: ANESTHESIOLOGY

## 2020-09-15 VITALS
BODY MASS INDEX: 43.94 KG/M2 | OXYGEN SATURATION: 100 % | DIASTOLIC BLOOD PRESSURE: 77 MMHG | RESPIRATION RATE: 14 BRPM | WEIGHT: 248 LBS | SYSTOLIC BLOOD PRESSURE: 122 MMHG | HEART RATE: 63 BPM | HEIGHT: 63 IN | TEMPERATURE: 97.5 F

## 2020-09-15 DIAGNOSIS — D50.9 IRON DEFICIENCY ANEMIA, UNSPECIFIED IRON DEFICIENCY ANEMIA TYPE: ICD-10-CM

## 2020-09-15 DIAGNOSIS — B49 FUNGAL INFECTION: ICD-10-CM

## 2020-09-15 DIAGNOSIS — M48.061 DEGENERATIVE LUMBAR SPINAL STENOSIS: ICD-10-CM

## 2020-09-15 DIAGNOSIS — G25.81 RESTLESS LEG SYNDROME: Primary | ICD-10-CM

## 2020-09-15 RX ORDER — NYSTATIN 100000 U/G
CREAM TOPICAL 2 TIMES DAILY
Qty: 30 G | Refills: 3 | Status: SHIPPED | OUTPATIENT
Start: 2020-09-15 | End: 2021-04-08

## 2020-09-15 RX ORDER — ROPINIROLE 2 MG/1
4 TABLET, FILM COATED ORAL
Qty: 60 TABLET | Refills: 0 | Status: SHIPPED | OUTPATIENT
Start: 2020-09-15 | End: 2020-11-23 | Stop reason: DRUGHIGH

## 2020-09-15 RX ORDER — BUPIVACAINE HYDROCHLORIDE 2.5 MG/ML
INJECTION, SOLUTION EPIDURAL; INFILTRATION; INTRACAUDAL PRN
Status: DISCONTINUED | OUTPATIENT
Start: 2020-09-15 | End: 2020-09-15 | Stop reason: HOSPADM

## 2020-09-15 RX ORDER — METHYLPREDNISOLONE ACETATE 40 MG/ML
INJECTION, SUSPENSION INTRA-ARTICULAR; INTRALESIONAL; INTRAMUSCULAR; SOFT TISSUE PRN
Status: DISCONTINUED | OUTPATIENT
Start: 2020-09-15 | End: 2020-09-15 | Stop reason: HOSPADM

## 2020-09-15 RX ORDER — IOPAMIDOL 408 MG/ML
INJECTION, SOLUTION INTRATHECAL PRN
Status: DISCONTINUED | OUTPATIENT
Start: 2020-09-15 | End: 2020-09-15 | Stop reason: HOSPADM

## 2020-09-15 RX ORDER — LIDOCAINE HYDROCHLORIDE 10 MG/ML
INJECTION, SOLUTION EPIDURAL; INFILTRATION; INTRACAUDAL; PERINEURAL PRN
Status: DISCONTINUED | OUTPATIENT
Start: 2020-09-15 | End: 2020-09-15 | Stop reason: HOSPADM

## 2020-09-15 ASSESSMENT — MIFFLIN-ST. JEOR: SCORE: 1719.05

## 2020-09-15 NOTE — PROGRESS NOTES
"Resident video visit documentation      This patient is being evaluated via a billable video visit as an alternative to an in-person visit.       The patient has been notified of following:     \"This video visit will be conducted via a call between you and your physician/provider. We have found that certain health care needs can be provided without the need for an in-person physical exam.  This service lets us provide the care you need with a video conversation.  If a prescription is necessary we can send it directly to your pharmacy.  If lab work is needed we can place an order for that and you can then stop by our lab to have the test done at a later time. If during the course of the call the physician/provider feels a video visit is not appropriate, you will not be charged for this service.\"       Patient has given verbal consent for the virtual video visit? Yes  Did patient initiate this virtual visit? Yes      Person spoken to:  Zayra Ramirez    This was a synchronous virtual visit  Location of patient:home  Location of physician resident:  home office  Location of teaching physician: clinic  Department name:  Medicine  Mode of communication:  Video Conference via  [employer]    Time video initiated:  2:40  Time video ended:  2:55  Total length of video visit: 15 mins    Staffed with: Dr. Chew      [facility]         HPI:       Zayra Ramirez is a 50 year old female who presents to clinic for: Recheck Medication (Pt would like to discuss medication refills for a rash that she has on her chest (nystatin-triamcinolone) ) and Shaking (Pt reports restless leg sydrome )    Patient presents the clinic with 2 main concerns today.  First, she has a rash under her breasts and under her pannus.  She was to put nystatin cream on this rash and it would take care of it, however she recently ran out so is gone without for over a month.  Now the rash has returned and it is malodorous.    Her second, and " primary, concern today is her restless leg syndrome.  She has been taking ropinirole for this for several years with good benefit.  About 2 weeks ago she had what she describes as a flare of her restless leg syndrome, where her legs would move uncontrollably at nights, would have some numbness and tingling, were very sensitive to use the sheets on her bed.  She self uptitrated her ropinirole from 2 mg daily to 4 mg daily 2 weeks ago.  This is still not provided significant benefits, and she is also now running low on this medicine.  She describes that she has poor sleep at baseline.  She goes to bed at 7 or 8 PM and wakes up at 8 or 10 AM.  In total, she gets about 8 hours of sleep every night.  She drinks 5 Mountain Dew's daily.    She denies fevers, chills, cough, shortness of breath, pain, diarrhea, dysuria.      PMHx:  Past Medical History:   Diagnosis Date     Allergic rhinitis      Anemia      Arthritis      Asthma     copd     Dental abscess 8-2015     Depressive disorder      Gastroesophageal reflux disease      History of emphysema      Hoarseness      Hypertension      Morbid obesity with BMI of 40.0-44.9, adult (H)      Obstructive sleep apnea      Other chronic pain      Respiratory bronchiolitis interstitial lung disease (H)      Sleep apnea        PSHx:  Past Surgical History:   Procedure Laterality Date     CHOLECYSTECTOMY       COLONOSCOPY       COLONOSCOPY N/A 2/6/2020    Procedure: COLONOSCOPY, WITH POLYPECTOMY AND BIOPSY;  Surgeon: Julian Mccullough MD;  Location:  GI     ENT SURGERY       ESOPHAGOSCOPY, GASTROSCOPY, DUODENOSCOPY (EGD), COMBINED N/A 2/6/2020    Procedure: ESOPHAGOGASTRODUODENOSCOPY (EGD);  Surgeon: Julian Mccullough MD;  Location:  GI     EXCISE LESION INTRAORAL Bilateral 10/3/2018    Procedure: EXCISE LESION INTRAORAL;  Wide Local Excision Of of Left Oral Cavity Ulcer;  Surgeon: Morro Mijares MD;  Location:  OR      DRAIN SKIN ABSCESS SIMPLE/SINGLE   3/16/2012    Procedure:INCISION AND DRAINAGE, ABSCESS, SIMPLE; Surgeon:CHRISTIANO HANCOCK; Location: GI     HEAD & NECK SURGERY       HYSTERECTOMY       INCISION AND DRAINAGE ABDOMEN WASHOUT, COMBINED       INJECT SACROILIAC JOINT Bilateral 6/16/2020    Procedure: Bilateral sacroiliac joint steroid injection with fluoroscopy;  Surgeon: Tram Florian MD;  [facility]     LAMINECTOMY THORACIC ONE LEVEL N/A 8/19/2019    Procedure: LAMINECTOMY, SPINE, THORACIC, 11-12 and Part of Lumbar 1, DRAINAGE OF EPIDURAL ABCESS, Epidural Drain Placement X 2;  Surgeon: Yadiel Beal MD;  Location: [facility] OR     RADIO FREQUENCY ABLATION / DESTRUCTION OF SACROILOAC JOINT DORSAL PRIMARY RAMUS Bilateral 12/17/2019    Procedure: Bilateral lumbar radiofrequency ablation with fluoroscopy and intravenous sedation ( Lumbar 2,3,4,5 medial branch nerves for the bilateral lumbar3-4, 4-5 and 5-sacral1 joints.;  Surgeon: Tram Florian MD;  Location: [facility] OR     spinal cord stimulator  08/08/2019     spinal cord stimulator removal  08/13/2019       FamHx:  Family History   Problem Relation Age of Onset     Other Cancer Father         base of tongue cancer at age ~65     Hypertension Father      Back Pain Father      Asthma Mother        Allergies:     Allergies   Allergen Reactions     Bee Venom Anaphylaxis     Bees      Doxycycline Anaphylaxis     Patient thinks it may have been just nausea and vomiting, however unable to confirm     Erythromycin Anaphylaxis and Shortness Of Breath     Other reaction(s): Vomiting     Hydrocodone-Acetaminophen Itching       Meds:    Current Outpatient Medications:      albuterol (PROAIR HFA, PROVENTIL HFA, VENTOLIN HFA) 108 (90 BASE) MCG/ACT inhaler, Inhale 2 puffs into the lungs every 6 hours as needed for shortness of breath / dyspnea or wheezing (PT last dose 1.23.2020) , Disp: , Rfl:      ARIPiprazole (ABILIFY) 15 MG tablet, Take 15 mg by mouth At Bedtime , Disp: , Rfl:      busPIRone HCl  (BUSPAR) 30 MG tablet, Take 15 mg by mouth At Bedtime, Disp: , Rfl:      cholecalciferol ( ULTRA STRENGTH) 2000 units CAPS, TAKE ONE CAPSULE BY MOUTH DAILY IN AM, Disp: , Rfl:      cyclobenzaprine (FLEXERIL) 10 MG tablet, Take 1 tablet (10 mg) by mouth 3 times daily (Patient taking differently: Take 10 mg by mouth At Bedtime ), Disp: 30 tablet, Rfl: 0     DULoxetine (CYMBALTA) 60 MG capsule, Take 120 mg by mouth At Bedtime , Disp: , Rfl:      EPINEPHrine (EPIPEN/ADRENACLICK/OR ANY BX GENERIC EQUIV) 0.3 MG/0.3ML injection 2-pack, INJECT 0.3ML INTO THE MUSCLE ONCE AS NEEDED FOR ANAPHYLAXIS, Disp: , Rfl:      ferrous sulfate (FEROSUL) 325 (65 Fe) MG tablet, Take 325 mg by mouth daily (with breakfast) , Disp: , Rfl: 0     fluticasone-salmeterol (ADVAIR) 500-50 MCG/DOSE diskus inhaler, Inhale 1 puff into the lungs 2 times daily , Disp: , Rfl:      folic acid (FOLVITE) 1 MG tablet, Take 1 tablet (1 mg) by mouth daily, Disp: 90 tablet, Rfl: 3     lisinopril-hydrochlorothiazide (PRINZIDE/ZESTORETIC) 20-25 MG per tablet, Take 1 tablet by mouth daily (Patient taking differently: Take 1 tablet by mouth every evening ), Disp: 30 tablet, Rfl: 0     methotrexate 2.5 MG tablet, TAKE 9 TABLETS BY MOUTH ONCE WEEKLY, Disp: 108 tablet, Rfl: 3     montelukast (SINGULAIR) 10 MG tablet, Take 10 mg by mouth At Bedtime, Disp: , Rfl:      nystatin (MYCOSTATIN) 974850 UNIT/GM external cream, Apply topically 2 times daily, Disp: 30 g, Rfl: 3     omeprazole (PRILOSEC) 40 MG DR capsule, Take 40 mg by mouth as needed (PT last dose appr 2 weeks ago), Disp: , Rfl:      OXYGEN-HELIUM IN, 2-3L PRN during the day, 3L @ night, Disp: , Rfl:      rOPINIRole (REQUIP) 2 MG tablet, Take 2 tablets (4 mg) by mouth nightly as needed (for restless leg syndrome), Disp: 60 tablet, Rfl: 0     traMADol (ULTRAM) 50 MG tablet, Take 1-2 tablets ( mg) by mouth every 8 hours as needed for moderate to severe pain, Disp: 180 tablet, Rfl: 0     vitamin C 500 MG  TABS, Take 500 mg by mouth daily (with lunch) , Disp: , Rfl:      zinc sulfate (ZINCATE) 220 (50 Zn) MG capsule, Take 220 mg by mouth daily (with lunch) , Disp: , Rfl:   No current facility-administered medications for this visit.     Facility-Administered Medications Ordered in Other Visits:      Lidocaine 1 % injection 0.5-5 mL, 0.5-5 mL, Other, Once PRN, Gutierrez Candelario MD     sodium chloride (PF) 0.9% PF flush 5-50 mL, 5-50 mL, Intracatheter, Once PRN, Gutierrez Candelario MD    SocHx:  Social History     Socioeconomic History     Marital status: Single     Spouse name: Not on file     Number of children: Not on file     Years of education: Not on file     Highest education level: Not on file   Occupational History     Not on file   Social Needs     Financial resource strain: Not on file     Food insecurity     Worry: Not on file     Inability: Not on file     Transportation needs     Medical: Not on file     Non-medical: Not on file   Tobacco Use     Smoking status: Current Every Day Smoker     Packs/day: 1.00     Years: 30.00     Pack years: 30.00     Types: Cigarettes     Start date: 1/1/1996     Smokeless tobacco: Never Used   Substance and Sexual Activity     Alcohol use: No     Binge frequency: Monthly     Drug use: Yes     Types: Marijuana     Comment: very rarely      Sexual activity: Never   Lifestyle     Physical activity     Days per week: Not on file     Minutes per session: Not on file     Stress: Not on file   Relationships     Social connections     Talks on phone: Not on file     Gets together: Not on file     Attends [employer] service: Not on file     Active member of club or organization: Not on file     Attends meetings of clubs or organizations: Not on file     Relationship status: Not on file     Intimate partner violence     Fear of current or ex partner: Not on file     Emotionally abused: Not on file     Physically abused: Not on file     Forced sexual activity: Not on file   Other Topics  Concern     Parent/sibling w/ CABG, MI or angioplasty before 65F 55M? Not Asked   Social History Narrative     Not on file       Problem, Medication and Allergy Lists were reviewed and are current.  Patient is an established patient of this clinic.         Review of Systems:   ROS  I have personally reviewed and updated the complete ROS on the day of the visit.           Physical Exam:   LMP  (LMP Unknown)   There is no height or weight on file to calculate BMI.  Vitals were reviewed     Gen: Pleasant female, obese , alert, NAD  HEENT: NCAT, EOMI  CV: Appears well-perfused  Pulm: Normal respiratory effort  Skin: No suspicious lesions or rashes visible.  Did not evaluate rash under breasts over video per patient's preference.   Ext: FROM in b/l UE  Psych: Normal mood and affect, logical thought processes           Results:     Orders Only on 09/11/2020   Component Date Value Ref Range Status     COVID-19 Virus PCR to [facility] - So* 09/11/2020 Nasopharyngeal   Final     COVID-19 Virus PCR to [facility] - Re* 09/11/2020 Not Detected   Final    Comment: Collection of multiple specimens from the same patient may be necessary to   detect the virus. The possibility of a false negative should be considered if   the patient's recent exposure or clinical presentation suggests 2019 nCOV   infection and diagnostic tests for other causes of illness are negative.   Repeat testing may be considered in this setting.  Patient sample was heat inactivated and amplified using the HDPCR SARS-CoV-2   assay ([employer] The HDPCRTM SARS-CoV-2 assay is a reverse   transcription real-time polymerase chain reaction (qRT-PCR) test intended for   the qualitative detection of nucleic acid  from SARS-CoV-2 in human nasopharyngeal swabs, oropharyngeal swabs, anterior   nasal swabs, mid-turbinate nasal swabs as well as nasal aspirate, nasal wash,   and bronchoalveolar lavage (BAL) specimens from individuals who are suspected   of COVID-19 by  their healthcare provider.  A negative result does not rule out the presence of real-time PCR inhibitors   in the sp                           ecimen or COVID-19 RNA in concentrations below the limit of detection   of the assay. The possibility of a false negative should be considered if the   patients recent exposure or clinical presentation suggests COVID-19.   Additional testing or repeat testing requires consultation with the   laboratory.  Nasopharyngeal specimen is the preferred choice for swab-based SARS CoV2   testing. When collection of a nasopharyngeal swab is not possible the   following are acceptable alternatives:  an oropharyngeal (OP) specimen collected by a healthcare professional, or a   nasal mid-turbinate (NMT) swab collected by a healthcare professional or by   onsite self-collection (using a flocked tapered swab), or an anterior nares   specimen collected by a healthcare professional or by onsite self-collection   (using a round foam swab). (Centers for Disease Control)  Testing performed by [facility] ([facility]) [address] [facility] [address]  The test performance characteristics were determined by [facility]. It has not been   cleared or approved by the FDA.  The laboratory is regulated under the Clinical Laboratory Improvement   Amendments of 1988 (CLIA-88) as qualified to perform high-complexity testing.   This test is used for clinical purposes. It should not be regarded as   investigational or for research.         Lab Results   Component Value Date    WBC 8.5 02/26/2020    HGB 14.1 02/26/2020    HCT 43.4 02/26/2020     02/26/2020     02/26/2020    POTASSIUM 3.7 02/26/2020    CHLORIDE 101 02/26/2020    CO2 33 (H) 02/26/2020    BUN 9 02/26/2020    CR 0.81 02/26/2020    GLC 89 02/26/2020    SED 14 02/26/2020    DD 1.3 (H) 06/03/2016    TROPONIN 0.00 06/08/2016    TROPI  06/08/2016      <0.015  The 99th percentile for upper reference range is 0.045 ug/L.  Troponin values in   the range of 0.045 - 0.120 ug/L may be associated with risks of adverse   clinical events.      AST 13 02/26/2020    ALT 20 02/26/2020    ALKPHOS 85 02/26/2020    BILITOTAL 0.3 02/26/2020    INR 1.09 08/18/2019       Assessment and Plan     Zayra was seen today for recheck medication and shaking.    Diagnoses and all orders for this visit:    Restless leg syndrome  It is unclear if this diagnosis of restless leg syndrome is accurate.  She has not seen relief even with doubling her dose for 2 weeks.  However, increasing the dose of ropinerole is usually done incrementally over 7 weeks, so it is possible she may still see benefit.  Will keep her on this (max) dose of 4mg daily for a limitied time and concurrently have her see sleep medicine for evaluation of restless leg syndrome.  DDx would also include periodic limb movement disorder, neuropathy, claudication, akithisia.  Will also check ferritin since this could be corrected if low and that might improve sx.   -     SLEEP EVALUATION & MANAGEMENT REFERRAL - ADULT -[facility] [phone] (Age 15 and up)  -     Ferritin; Future  -     rOPINIRole (REQUIP) 2 MG tablet; Take 2 tablets (4 mg) by mouth nightly as needed (for restless leg syndrome)    Fungal infection  -     nystatin (MYCOSTATIN) 712636 UNIT/GM external cream; Apply topically 2 times daily    Iron deficiency anemia, unspecified iron deficiency anemia type   -     Ferritin; Future      Options for treatment and follow-up care were reviewed with the patient. Zayra Ramirez engaged in the decision making process and verbalized understanding of the options discussed and agreed with the final plan.    Vahid Gar, DO  Sep 15, 2020    Plan of care discussed with Dr. Chew.     Teaching Physician Note:    I, Dr. Chew, was immediately accessible to the resident, and agree  with the residents's findings and plan of care, as documented in the resident's note.     Francoise Chew M.D.  Internal Medicine  [facility]         Patients: if you have questions or concerns about this progress note, please discuss them with the provider at at a future office visit.

## 2020-09-15 NOTE — NURSING NOTE
Chief Complaint   Patient presents with     Recheck Medication     Pt would like to discuss medication refills for a rash that she has on her chest (nystatin-triamcinolone)      Shaking     Pt reports restless leg sydrome      Meaghan Hoover, EMT at 1:36 PM sign on 9/15/2020

## 2020-09-15 NOTE — PROGRESS NOTES
"Video Visit Technology for this patient: Rosey not working, patient has smart device, please try [employer] Video with patient [phone]    Zayra Ramirez is a 50 year old female who is being evaluated via a billable video visit.      The patient has been notified of following:     \"This video visit will be conducted via a call between you and your physician/provider. We have found that certain health care needs can be provided without the need for an in-person physical exam.  This service lets us provide the care you need with a video conversation.  If a prescription is necessary we can send it directly to your pharmacy.  If lab work is needed we can place an order for that and you can then stop by our lab to have the test done at a later time.    Video visits are billed at different rates depending on your insurance coverage.  Please reach out to your insurance provider with any questions.    If during the course of the call the physician/provider feels a video visit is not appropriate, you will not be charged for this service.\"    Patient has given verbal consent for Video visit? Yes  How would you like to obtain your AVS? MyChart  If you are dropped from the video visit, the video invite should be resent to: Text to cell phone: [phone]  Will anyone else be joining your video visit? No      "

## 2020-09-15 NOTE — H&P
Zayra Ramirez  [id number]  female  50 year old      Reason for procedure/surgery: LESi    Patient Active Problem List   Diagnosis     Chronic midline low back pain     Facial cellulitis     Morbid (severe) obesity due to excess calories (H)     Dental abscess     Hypoxia     Subcutaneous emphysema (H)     Borderline personality disorder (H)     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)     Esophageal stricture     GERD (gastroesophageal reflux disease)     HTN (hypertension)     Interstitial lung disease (H)     Moderate persistent asthma without complication     Onychomycosis     Restless leg syndrome     Seasonal allergies     Smoker     Stress     Respiratory bronchiolitis interstitial lung disease (H)     Spinal epidural abscess     Lumbar spondylosis     Inflammatory arthritis     Arthralgia of temporomandibular joint     Articular disc disorder of temporomandibular joint     Derangement of temporomandibular joint     Calculus of gallbladder without cholecystitis without obstruction     Displacement of articular disc of temporomandibular joint with reduction     Myofascial pain     Oral lesion     Symptomatic cholelithiasis     Sacro-iliac pain     Degenerative lumbar spinal stenosis       Past Surgical History:    Past Surgical History:   Procedure Laterality Date     CHOLECYSTECTOMY       COLONOSCOPY       COLONOSCOPY N/A 2/6/2020    Procedure: COLONOSCOPY, WITH POLYPECTOMY AND BIOPSY;  Surgeon: Julian Mccullough MD;  Location:  GI     ENT SURGERY       ESOPHAGOSCOPY, GASTROSCOPY, DUODENOSCOPY (EGD), COMBINED N/A 2/6/2020    Procedure: ESOPHAGOGASTRODUODENOSCOPY (EGD);  Surgeon: Julian Mccullough MD;  Location:  GI     EXCISE LESION INTRAORAL Bilateral 10/3/2018    Procedure: EXCISE LESION INTRAORAL;  Wide Local Excision Of of Left Oral Cavity Ulcer;  Surgeon: Morro Mijares MD;  Location:  OR      DRAIN SKIN ABSCESS SIMPLE/SINGLE  3/16/2012    Procedure:INCISION AND  DRAINAGE, ABSCESS, SIMPLE; Surgeon:CHRISTIANO HANCOCK; Location: GI     HEAD & NECK SURGERY       HYSTERECTOMY       INCISION AND DRAINAGE ABDOMEN WASHOUT, COMBINED       INJECT SACROILIAC JOINT Bilateral 6/16/2020    Procedure: Bilateral sacroiliac joint steroid injection with fluoroscopy;  Surgeon: Tram Florian MD;  Location: [facility] OR     LAMINECTOMY THORACIC ONE LEVEL N/A 8/19/2019    Procedure: LAMINECTOMY, SPINE, THORACIC, 11-12 and Part of Lumbar 1, DRAINAGE OF EPIDURAL ABCESS, Epidural Drain Placement X 2;  Surgeon: Yadiel Beal MD;  Location: [facility] OR     RADIO FREQUENCY ABLATION / DESTRUCTION OF SACROILOAC JOINT DORSAL PRIMARY RAMUS Bilateral 12/17/2019    Procedure: Bilateral lumbar radiofrequency ablation with fluoroscopy and intravenous sedation ( Lumbar 2,3,4,5 medial branch nerves for the bilateral lumbar3-4, 4-5 and 5-sacral1 joints.;  Surgeon: Tram Florian MD;  Location: [facility] OR     spinal cord stimulator  08/08/2019     spinal cord stimulator removal  08/13/2019       Past Medical History:   Past Medical History:   Diagnosis Date     Allergic rhinitis      Anemia      Arthritis      Asthma     copd     Dental abscess 8-2015     Depressive disorder      Gastroesophageal reflux disease      History of emphysema      Hoarseness      Hypertension      Morbid obesity with BMI of 40.0-44.9, adult (H)      Obstructive sleep apnea      Other chronic pain      Respiratory bronchiolitis interstitial lung disease (H)      Sleep apnea        Social History:   Social History     Tobacco Use     Smoking status: Current Every Day Smoker     Packs/day: 1.00     Years: 30.00     Pack years: 30.00     Types: Cigarettes     Start date: 1/1/1996     Smokeless tobacco: Never Used   Substance Use Topics     Alcohol use: No     Binge frequency: Monthly       Family History:   Family History   Problem Relation Age of Onset     Other Cancer Father         base of tongue cancer at age ~65     Hypertension Father       Back Pain Father      Asthma Mother        Allergies:   Allergies   Allergen Reactions     Bee Venom Anaphylaxis     Bees      Doxycycline Anaphylaxis     Patient thinks it may have been just nausea and vomiting, however unable to confirm     Erythromycin Anaphylaxis and Shortness Of Breath     Other reaction(s): Vomiting     Hydrocodone-Acetaminophen Itching       Active Medications:   Current Outpatient Medications   Medication Sig Dispense Refill     albuterol (PROAIR HFA, PROVENTIL HFA, VENTOLIN HFA) 108 (90 BASE) MCG/ACT inhaler Inhale 2 puffs into the lungs every 6 hours as needed for shortness of breath / dyspnea or wheezing (PT last dose 1.23.2020)        ARIPiprazole (ABILIFY) 15 MG tablet Take 15 mg by mouth At Bedtime        busPIRone HCl (BUSPAR) 30 MG tablet Take 15 mg by mouth At Bedtime       cholecalciferol ( ULTRA STRENGTH) 2000 units CAPS TAKE ONE CAPSULE BY MOUTH DAILY IN AM       cyclobenzaprine (FLEXERIL) 10 MG tablet Take 1 tablet (10 mg) by mouth 3 times daily (Patient taking differently: Take 10 mg by mouth At Bedtime ) 30 tablet 0     DULoxetine (CYMBALTA) 60 MG capsule Take 120 mg by mouth At Bedtime        ferrous sulfate (FEROSUL) 325 (65 Fe) MG tablet Take 325 mg by mouth daily (with breakfast)   0     folic acid (FOLVITE) 1 MG tablet Take 1 tablet (1 mg) by mouth daily 90 tablet 3     lisinopril-hydrochlorothiazide (PRINZIDE/ZESTORETIC) 20-25 MG per tablet Take 1 tablet by mouth daily (Patient taking differently: Take 1 tablet by mouth every evening ) 30 tablet 0     methotrexate 2.5 MG tablet TAKE 9 TABLETS BY MOUTH ONCE WEEKLY 108 tablet 3     omeprazole (PRILOSEC) 40 MG DR capsule Take 40 mg by mouth as needed (PT last dose appr 2 weeks ago)       OXYGEN-HELIUM IN 2-3L PRN during the day, 3L @ night       rOPINIRole (REQUIP) 2 MG tablet Take 2 mg by mouth nightly as needed        traMADol (ULTRAM) 50 MG tablet Take 1-2 tablets ( mg) by mouth every 8 hours as needed  "for moderate to severe pain 180 tablet 0     vitamin C 500 MG TABS Take 500 mg by mouth daily (with lunch)        zinc sulfate (ZINCATE) 220 (50 Zn) MG capsule Take 220 mg by mouth daily (with lunch)        EPINEPHrine (EPIPEN/ADRENACLICK/OR ANY BX GENERIC EQUIV) 0.3 MG/0.3ML injection 2-pack INJECT 0.3ML INTO THE MUSCLE ONCE AS NEEDED FOR ANAPHYLAXIS       fluticasone-salmeterol (ADVAIR) 500-50 MCG/DOSE diskus inhaler Inhale 1 puff into the lungs 2 times daily        montelukast (SINGULAIR) 10 MG tablet Take 10 mg by mouth At Bedtime         Systemic Review:   CONSTITUTIONAL: NEGATIVE for fever, chills, change in weight  ENT/MOUTH: NEGATIVE for ear, mouth and throat problems  RESP: NEGATIVE for significant cough or SOB  CV: NEGATIVE for chest pain, palpitations or peripheral edema    Physical Examination:   Vital Signs: BP (!) 145/81   Temp 98.9  F (37.2  C) (Oral)   Resp 22   Ht 1.6 m (5' 3\")   Wt 112.5 kg (248 lb)   LMP  (LMP Unknown)   SpO2 96%   BMI 43.93 kg/m    GENERAL: healthy, alert and no distress  NECK: no adenopathy, no asymmetry, masses, or scars  RESP: lungs clear to auscultation - no rales, rhonchi or wheezes  CV: regular rate and rhythm, normal S1 S2, no S3 or S4, no murmur, click or rub, no peripheral edema and peripheral pulses strong  ABDOMEN: soft, nontender, no hepatosplenomegaly, no masses and bowel sounds normal  MS: no gross musculoskeletal defects noted, no edema    Plan: Appropriate to proceed as scheduled.      Tram Florian MD  9/15/2020          "

## 2020-09-15 NOTE — DISCHARGE INSTRUCTIONS
PAIN INJECTION HOME CARE INSTRUCTIONS  Activity  -You may resume most normal activity levels with the exception of strenuous activity. It may help to move in ways that hurt before the injection, to see if the pain is still there, but do not overdo it. Take it easy for the rest of the day.    -DO NOT remove bandaid for 24 hours  -DO NOT shower for 24 hours      Pain  -You may feel immediate pain relief and numbness for a period of time after the injection. This may indicate the medication has reached the right spot.  -Your pain may return after this short pain-free period, or may even be a little worse for a day or two. It may be caused by needle irritation or by the medication itself. The medications usually take two or three days to start working, but can take as long as a week.    -You may use an ice pack for 20 minutes every 2 hours for the first 24 hours  -You may use a heating pad after the first 24 hours  -You may use Tylenol (acetaminophen) every 4 hours or other pain medicines as directed by your physician      DID YOU RECEIVE SEDATION TODAY?  No      DID YOU RECEIVE STEROIDS TODAY?  Yes    Common side effects of steroids:  Not everyone will experience corticosteroid side effects. If side effects are experienced, they will gradually subside in the 7-10 day period following an injection. Most common side effects include:  -Flushed face and/or chest  -Feeling of warmth, particularly in the face but could be an overall feeling of warmth  -Increased blood sugar in diabetic patients  -Menstrual irregularities my occur. If taking hormone-based birth control an alternate method of birth control is recommended  -Sleep disturbances and/or mood swings are possible  -Leg cramps    PLEASE KEEP TRACK OF YOUR SYMPTOMS AND NOTE ANY CHANGES FOR YOUR DOCTOR.       Please contact us if you have:  -Severe pain  -Fever more than 101.5 degrees Fahrenheit  -Signs of infection at the injection site (redness, swelling, or  drainage)    If you have questions, please contact the [facility] at [phone] between the hours of 7:00 am and 3:00 pm Monday through Friday. After office hours you can contact the on call provider by dialing [phone]. If you need immediate attention, we recommend that you go to a hospital emergency room or dial [phone].    If you have scheduling questions please call [phone].

## 2020-09-25 VITALS — HEIGHT: 64 IN | BODY MASS INDEX: 42.34 KG/M2 | WEIGHT: 248 LBS

## 2020-09-25 ASSESSMENT — MIFFLIN-ST. JEOR: SCORE: 1726.98

## 2020-09-25 NOTE — PROGRESS NOTES
"Zayra Ramirez is a 50 year old female who is being evaluated via a billable telephone visit.      The patient has been notified of following:     \"This telephone visit will be conducted via a call between you and your physician/provider. We have found that certain health care needs can be provided without the need for a physical exam.  This service lets us provide the care you need with a short phone conversation.  If a prescription is necessary we can send it directly to your pharmacy.  If lab work is needed we can place an order for that and you can then stop by our lab to have the test done at a later time.    Telephone visits are billed at different rates depending on your insurance coverage. During this emergency period, for some insurers they may be billed the same as an in-person visit.  Please reach out to your insurance provider with any questions.    If during the course of the call the physician/provider feels a telephone visit is not appropriate, you will not be charged for this service.\"    Patient has given verbal consent for Telephone visit?  Yes    What phone number would you like to be contacted at? [phone]    How would you like to obtain your AVS? Mail a copy        Sleep Consultation:    Date on this visit: 9/28/2020    Zayra Ramirez is sent by Francoise Chwe for a sleep consultation regarding RLS.    Primary Physician: Adam Del Toro     Zayra Ramirez presents with exacerbation of previously diagnosed restless legs. 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     She had been stable on 2 mg ropinirole for years. She increased the dose on her own to 4 mg in the last month " "due to what she assumed was a worsening of her RLS. She went out of town and forgot her ropinirole. She used [employer] [facility]ful [employer]s for a few days. Per the note from her visit on 9/15, she experienced uncontrollable movements in her legs at night, numbness and tingling and hypersensitivity to her sheets. She has been having symptoms in her arms and legs in the daytime now too. She has had to take 2 mg ropinirole in the daytime and 1 at bedtime. Her ferritin was 22.8 in 10/2018. A repeat ferritin was ordered, but has not yet been completed. She is on iron once daily. She tramadol  mg, not daily. She has not noticed if it does anything for her restlessness. She was prescribed pregabalin earlier in the year for her back pain, but it did not help the pain. She believes she kicks in her sleep. She is not sure if she has periodic limb movements or just tossing and turning. She takes iron once daily in the morning, not with Vit C. She takes omeprazole in the evening.    She sees mental health providers at [facility]. Her mental health provider had advised against gabapentin in the past and was not excited about her being on pregabalin.  On 9/15, she also received a right L5-S1 epidural steroid injection.       Zayra goes to sleep at 7:00-8:00 PM during the week. She wakes up at 8:00-10:00 AM without an alarm. She falls asleep in 10 minutes if not restless.  Zayra denies difficulty falling asleep. She takes aripiprazole 15 mg, buspirone 30 mg, cyclobenzaprine 10 mg (for bruxism), 120 mg duloxetine at bedtime. She does not take those medications specifically for sleep though. She wakes up 2-3 times a night for up to 60 minutes before falling back to sleep.  Zayra wakes up to uncertain reasons. She does sometimes get a numbness and \"heeby-jeebies\" in there legs and then she kicks. That may occur in the middle of the night a couple of times per week.  She does use the restroom when she wakes, but does " not feel that is why she wakes. On weekends, her sleep schedule is the same.  Patient gets an average of 8 hours of sleep per night.     Patient does watch TV in bed (sometimes on all night) and does not use electronics in bed, worry in bed about anything and read in bed.     Zayra does not do shift work.  She works part-time day shifts. She works as a  for private homes. She lives with her significant other and a roommate.     Zayra has been diagnosed with ROBERT and hypoxemia in the past. She is on CPAP 14 cm with 3 lpm O2. She follows with Dr. Bonilla at [facility] yearly for her sleep disordered breathing. She does not snore with CPAP. She uses a nasal mask. She denies mask leak. She does get a bad dry mouth, but is not aware of mouth leak. She does use the humidifier. It does not usually run out of water. She denies mask leak. Patient does have a regular bed partner. They never sleep separately.  There is no report of gasping, snorting or witnessed apneas.  Patient sleeps on her sides. She has no morning headaches and morning confusion. Zayra has occasional bruxism (had a bite guard from [facility] [employer] [facility], but her dog at the appliance) and denies any sleep walking, sleep talking, dream enactment, sleep paralysis, cataplexy and hypnogogic/hypnopompic hallucinations.     Zayra has claustrophobia (some), reflux at night and heartburn, denies difficulty breathing through her nose and depression.      Zayra has gained 50 pounds in the last couple of years.  Patient describes themself as neither a morning or night person.  She would prefer to go to sleep at 8:00 PM and wake up at 8:00 AM.  Patient's [place] Sleepiness score 12/24 consistent with mild daytime sleepiness.      Zayra naps 3-4 times per week for  minutes, feels refreshed after naps. She takes some inadvertant naps reading or watching TV. She dozed in classes even since being on CPAP.  She denies dozing while driving. She has gotten  drowsy on longer road trips, and she will have someone else drive. Patient was counseled on the importance of driving while alert, to pull over if drowsy, or nap before getting into the vehicle if sleepy.  She uses 5 Mountain Dew/day. Last caffeine intake is usually before 6 PM.    Allergies:    Allergies   Allergen Reactions     Bee Venom Anaphylaxis     Bees      Doxycycline Anaphylaxis     Patient thinks it may have been just nausea and vomiting, however unable to confirm     Erythromycin Anaphylaxis and Shortness Of Breath     Other reaction(s): Vomiting     Hydrocodone-Acetaminophen Itching       Medications:    Current Outpatient Medications   Medication Sig Dispense Refill     albuterol (PROAIR HFA, PROVENTIL HFA, VENTOLIN HFA) 108 (90 BASE) MCG/ACT inhaler Inhale 2 puffs into the lungs every 6 hours as needed for shortness of breath / dyspnea or wheezing (PT last dose 1.23.2020)        ARIPiprazole (ABILIFY) 15 MG tablet Take 15 mg by mouth At Bedtime        busPIRone HCl (BUSPAR) 30 MG tablet Take 15 mg by mouth At Bedtime       cholecalciferol ( ULTRA STRENGTH) 2000 units CAPS TAKE ONE CAPSULE BY MOUTH DAILY IN AM       cyclobenzaprine (FLEXERIL) 10 MG tablet Take 1 tablet (10 mg) by mouth 3 times daily (Patient taking differently: Take 10 mg by mouth At Bedtime ) 30 tablet 0     DULoxetine (CYMBALTA) 60 MG capsule Take 120 mg by mouth At Bedtime        EPINEPHrine (EPIPEN/ADRENACLICK/OR ANY BX GENERIC EQUIV) 0.3 MG/0.3ML injection 2-pack INJECT 0.3ML INTO THE MUSCLE ONCE AS NEEDED FOR ANAPHYLAXIS       ferrous sulfate (FEROSUL) 325 (65 Fe) MG tablet Take 325 mg by mouth daily (with breakfast)   0     fluticasone-salmeterol (ADVAIR) 500-50 MCG/DOSE diskus inhaler Inhale 1 puff into the lungs 2 times daily        folic acid (FOLVITE) 1 MG tablet Take 1 tablet (1 mg) by mouth daily 90 tablet 3     lisinopril-hydrochlorothiazide (PRINZIDE/ZESTORETIC) 20-25 MG per tablet Take 1 tablet by mouth daily  (Patient taking differently: Take 1 tablet by mouth every evening ) 30 tablet 0     methotrexate 2.5 MG tablet TAKE 9 TABLETS BY MOUTH ONCE WEEKLY 108 tablet 3     montelukast (SINGULAIR) 10 MG tablet Take 10 mg by mouth At Bedtime       nystatin (MYCOSTATIN) 059025 UNIT/GM external cream Apply topically 2 times daily 30 g 3     omeprazole (PRILOSEC) 40 MG DR capsule Take 40 mg by mouth as needed (PT last dose appr 2 weeks ago)       OXYGEN-HELIUM IN 2-3L PRN during the day, 3L @ night       rOPINIRole (REQUIP) 2 MG tablet Take 2 tablets (4 mg) by mouth nightly as needed (for restless leg syndrome) 60 tablet 0     traMADol (ULTRAM) 50 MG tablet Take 1-2 tablets ( mg) by mouth every 8 hours as needed for moderate to severe pain 180 tablet 0     vitamin C 500 MG TABS Take 500 mg by mouth daily (with lunch)        zinc sulfate (ZINCATE) 220 (50 Zn) MG capsule Take 220 mg by mouth daily (with lunch)          Problem List:  Patient Active Problem List    Diagnosis Date Noted     Morbid (severe) obesity due to excess calories (H) 08/08/2015     Priority: High     Degenerative lumbar spinal stenosis 08/31/2020     Priority: Medium     Added automatically from request for surgery [id number]       Sacro-iliac pain 06/09/2020     Priority: Medium     Added automatically from request for surgery [id number]       Calculus of gallbladder without cholecystitis without obstruction 01/21/2020     Priority: Medium     Symptomatic cholelithiasis 01/21/2020     Priority: Medium     Inflammatory arthritis 11/27/2019     Priority: Medium     Lumbar spondylosis 11/04/2019     Priority: Medium     Added automatically from request for surgery [id number]       Spinal epidural abscess 08/19/2019     Priority: Medium     Respiratory bronchiolitis interstitial lung disease (H)      Priority: Medium     Subcutaneous emphysema (H) 04/05/2018     Priority: Medium     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) 10/13/2017     Priority: Medium      Oral lesion 02/14/2017     Priority: Medium     Interstitial lung disease (H) 11/17/2016     Priority: Medium     Overview:   Patient sees Pulm       Derangement of temporomandibular joint 11/15/2016     Priority: Medium     Displacement of articular disc of temporomandibular joint with reduction 11/15/2016     Priority: Medium     Arthralgia of temporomandibular joint 09/20/2016     Priority: Medium     Articular disc disorder of temporomandibular joint 09/20/2016     Priority: Medium     Myofascial pain 09/20/2016     Priority: Medium     Esophageal stricture 07/18/2016     Priority: Medium     Overview:   With Hiatal hernia; on Acid blocker lifelong       Stress 06/15/2016     Priority: Medium     Hypoxia 06/08/2016     Priority: Medium     Onychomycosis 06/01/2016     Priority: Medium     Restless leg syndrome 06/01/2016     Priority: Medium     Smoker 11/02/2015     Priority: Medium     Dental abscess 08/08/2015     Priority: Medium     Facial cellulitis 03/05/2015     Priority: Medium     Chronic midline low back pain 11/10/2014     Priority: Medium     Diagnosis updated by automated process. Provider to review and confirm.       HTN (hypertension) 01/28/2013     Priority: Medium     Borderline personality disorder (H) 01/12/2011     Priority: Medium     GERD (gastroesophageal reflux disease) 03/23/2010     Priority: Medium     Moderate persistent asthma without complication 03/23/2010     Priority: Medium     Overview:   Patient sees Pulm       Seasonal allergies 03/23/2010     Priority: Medium        Past Medical/Surgical History:  Past Medical History:   Diagnosis Date     Allergic rhinitis      Anemia      Arthritis      Asthma     copd     Dental abscess 8-2015     Depressive disorder      Gastroesophageal reflux disease      History of emphysema      Hoarseness      Hypertension      Morbid obesity with BMI of 40.0-44.9, adult (H)      Obstructive sleep apnea      Other chronic pain      Respiratory  bronchiolitis interstitial lung disease (H)      Sleep apnea      Past Surgical History:   Procedure Laterality Date     CHOLECYSTECTOMY       COLONOSCOPY       COLONOSCOPY N/A 2/6/2020    Procedure: COLONOSCOPY, WITH POLYPECTOMY AND BIOPSY;  Surgeon: Julian Mccullough MD;  Location:  GI     ENT SURGERY       ESOPHAGOSCOPY, GASTROSCOPY, DUODENOSCOPY (EGD), COMBINED N/A 2/6/2020    Procedure: ESOPHAGOGASTRODUODENOSCOPY (EGD);  Surgeon: Julian Mccullough MD;  Location:  GI     EXCISE LESION INTRAORAL Bilateral 10/3/2018    Procedure: EXCISE LESION INTRAORAL;  Wide Local Excision Of of Left Oral Cavity Ulcer;  Surgeon: Morro Mijares MD;  Location: [facility] OR     HC DRAIN SKIN ABSCESS SIMPLE/SINGLE  3/16/2012    Procedure:INCISION AND DRAINAGE, ABSCESS, SIMPLE; Surgeon:CHRISTIANO HANCOCK; Location: GI     HEAD & NECK SURGERY       HYSTERECTOMY       INCISION AND DRAINAGE ABDOMEN WASHOUT, COMBINED       INJECT EPIDURAL LUMBAR Right 9/15/2020    Procedure: Lumbar5- sacral 1 epidural steroid injection with fluoroscopy;  Surgeon: Tram Florian MD;  Location: [facility] OR     INJECT SACROILIAC JOINT Bilateral 6/16/2020    Procedure: Bilateral sacroiliac joint steroid injection with fluoroscopy;  Surgeon: Tram Florian MD;  Location: [facility] OR     LAMINECTOMY THORACIC ONE LEVEL N/A 8/19/2019    Procedure: LAMINECTOMY, SPINE, THORACIC, 11-12 and Part of Lumbar 1, DRAINAGE OF EPIDURAL ABCESS, Epidural Drain Placement X 2;  Surgeon: Yadiel Beal MD;  Location: [facility] OR     RADIO FREQUENCY ABLATION / DESTRUCTION OF SACROILOAC JOINT DORSAL PRIMARY RAMUS Bilateral 12/17/2019    Procedure: Bilateral lumbar radiofrequency ablation with fluoroscopy and intravenous sedation ( Lumbar 2,3,4,5 medial branch nerves for the bilateral lumbar3-4, 4-5 and 5-sacral1 joints.;  Surgeon: Tram Florian MD;  Location: [facility] OR     spinal cord stimulator  08/08/2019     spinal cord stimulator removal  08/13/2019       Social  History:  Social History     Socioeconomic History     Marital status: Single     Spouse name: Not on file     Number of children: Not on file     Years of education: Not on file     Highest education level: Not on file   Occupational History     Not on file   Social Needs     Financial resource strain: Not on file     Food insecurity     Worry: Not on file     Inability: Not on file     Transportation needs     Medical: Not on file     Non-medical: Not on file   Tobacco Use     Smoking status: Current Every Day Smoker     Packs/day: 1.00     Years: 30.00     Pack years: 30.00     Types: Cigarettes     Start date: 1/1/1996     Smokeless tobacco: Never Used   Substance and Sexual Activity     Alcohol use: No     Binge frequency: Monthly     Drug use: Yes     Types: Marijuana     Comment: very rarely      Sexual activity: Never   Lifestyle     Physical activity     Days per week: Not on file     Minutes per session: Not on file     Stress: Not on file   Relationships     Social connections     Talks on phone: Not on file     Gets together: Not on file     Attends Gnosticism service: Not on file     Active member of club or organization: Not on file     Attends meetings of clubs or organizations: Not on file     Relationship status: Not on file     Intimate partner violence     Fear of current or ex partner: Not on file     Emotionally abused: Not on file     Physically abused: Not on file     Forced sexual activity: Not on file   Other Topics Concern     Parent/sibling w/ CABG, MI or angioplasty before 65F 55M? Not Asked   Social History Narrative     Not on file       Family History:  Family History   Problem Relation Age of Onset     Other Cancer Father         base of tongue cancer at age ~65     Hypertension Father      Back Pain Father      Asthma Mother        Review of Systems:  A complete review of systems reviewed by me is negative with the exeption of what has been mentioned in the history of present  "illness.  CONSTITUTIONAL: NEGATIVE for weight gain/loss, fever, chills, sweats or night sweats, drug allergies.  EYES: NEGATIVE for changes in vision, blind spots, double vision.  ENT: NEGATIVE for ear pain, sore throat, sinus pain, runny nose, bloody nose  ENT:  POSITIVE for  post-nasal drip  CARDIAC: NEGATIVE for fast heartbeats or fluttering in chest, chest pain or pressure, breathlessness when lying flat.  CARDIAC:  POSITIVE for  Swollen ankles/feet-not every day  NEUROLOGIC: NEGATIVE headaches, weakness or numbness in the arms or legs.  DERMATOLOGIC: NEGATIVE for rashes, new moles or change in mole(s)  PULMONARY: NEGATIVE dry cough, productive cough, coughing up blood, whistling when breathing.    PULMONARY:  POSITIVE for  SOB at rest, SOB with activity and wheezing   GASTROINTESTINAL: NEGATIVE for nausea or vomitting, loose or watery stools, fat or grease in stools, constipation, abdominal pain, bowel movements black in color or blood noted.  GENITOURINARY: NEGATIVE for pain during urination, blood in urine, urinating more frequently than usual, irregular menstrual periods.  MUSCULOSKELETAL: NEGATIVE for muscle pain, bone or joint pain.  MUSCULOSKELETAL:  POSITIVE for  swollen joints  ENDOCRINE: NEGATIVE for increased thirst or urination, diabetes.  LYMPHATIC: NEGATIVE for swollen lymph nodes, lumps or bumps in the breasts or nipple discharge.    Physical Examination:  Vitals: Ht 1.613 m (5' 3.5\")   Wt 112.5 kg (248 lb)   LMP  (LMP Unknown)   BMI 43.24 kg/m           [place] Total Score 9/25/2020   Total score - [place] 12       ESDRAS Total Score: 15 (09/25/20 0935)      Impression/Plan:    (G25.81) Restless leg syndrome  (primary encounter diagnosis)  Comment: Zayra presents with a sudden worsening of restless legs symptoms. She had been well controlled on 2 mg ropinirole until about a month ago. She forgot to bring the ropinirole on a trip, so used [facility]'s Restful Legs instead for a couple of nights. She " restarted the ropinirole when she got home, but then found that she started to develop symptoms in the daytime. She has now been taking 2 mg in the middle of the day and 2 mg at bedtime. She has also noted having restlessness in her arms as well. She is told that she kicks in her sleep. She is not sure if this represents periodic limb movements or just tossing and turning. She denies any other medication changes. Her presentation is consistent with augmentation. She has tramadol prn for low back pain. She was also prescribed 50 mg pregabalin, but does not think that helped the pain, so she stopped it.  A repeat ferritin was ordered but not drawn yet. She is on iron in the morning, but does not take it with Vit C. Her ferritin was 22 two years ago. She has 5 cans of Mountain Dew per day, often as late as 6 PM. She denies alcohol.  Plan: rOPINIRole (REQUIP) 0.5 MG tablet, pregabalin (LYRICA) 50 MG capsule        Cut the ropinirole to 1.5 mg at midday and evening for 5 days. Then reduce each dose by 0.5 mg every 5 days. Add pregabalin at 50 mg twice daily. May increase by 50 mg every 5 days if needed to 150 mg twice daily. Get ferritin drawn. Reduce caffeine to no more than 3 servings no later than 3 PM. Take the iron in the morning with Vitamin C.    (F51.04) Chronic insomnia  Comment: She has prolonged awakenings in the night. She is in bed between 7-8 PM to 8-10 AM. She also naps for  minutes 3-4 times per week.  Plan: We talked about circadian and homeostatic sleep drives. Reduce time in bed. Lock wake time to 8 AM to an alarm. Push your bedtime later by 30 minutes every 1-2 weeks until having shorter awakenings.     (G47.33) ROBERT (obstructive sleep apnea)  Comment: She is currently on CPAP and 3 lpm O2, followed by a provider at [facility].  Plan: Continue to follow with that provider.        She will follow up with me in approximately 1 month.       66 minutes (8:24 AM- 9:30 AM) was spent during this visit, over  50% in counseling and coordination of care.     Bennett Goltz, PA-C     CC: Francoise Chew

## 2020-09-25 NOTE — PATIENT INSTRUCTIONS
Reduce the ropinirole to 1.5 mg twice daily for 5 days. Add in the pregabalin 50 mg twice daily to help reduce restlessness. Every 5 days, reduce the ropinirole doses by another 0.5 mg and increase pregabalin if needed by 50 mg (up to 150 mg twice daily).   Get your blood drawn to check ferritin.  Reduce caffeine to no more than 3 servings, no later than 3 PM.   Take Vitamin C with the iron in the morning.   Try to reduce your time in bed to help reduce the duration of your middle of the night awakenings. Lock your wake time to an alarm to prevent sleeping in. Start with a sleep schedule of 9 PM to 8 AM. Every 1-2 weeks, push your bedtime 30 minutes later as needed.    Your BMI is Body mass index is 43.24 kg/m .  Weight management is a personal decision.  If you are interested in exploring weight loss strategies, the following discussion covers the approaches that may be successful. Body mass index (BMI) is one way to tell whether you are at a healthy weight, overweight, or obese. It measures your weight in relation to your height.  A BMI of 18.5 to 24.9 is in the healthy range. A person with a BMI of 25 to 29.9 is considered overweight, and someone with a BMI of 30 or greater is considered obese. More than two-thirds of American adults are considered overweight or obese.  Being overweight or obese increases the risk for further weight gain. Excess weight may lead to heart disease and diabetes.  Creating and following plans for healthy eating and physical activity may help you improve your health.  Weight control is part of healthy lifestyle and includes exercise, emotional health, and healthy eating habits. Careful eating habits lifelong are the mainstay of weight control. Though there are significant health benefits from weight loss, long-term weight loss with diet alone may be very difficult to achieve- studies show long-term success with dietary management in less than 10% of people. Attaining a healthy weight  may be especially difficult to achieve in those with severe obesity. In some cases, medications, devices and surgical management might be considered.  What can you do?  If you are overweight or obese and are interested in methods for weight loss, you should discuss this with your provider.     Consider reducing daily calorie intake by 500 calories.     Keep a food journal.     Avoiding skipping meals, consider cutting portions instead.    Diet combined with exercise helps maintain muscle while optimizing fat loss. Strength training is particularly important for building and maintaining muscle mass. Exercise helps reduce stress, increase energy, and improves fitness. Increasing exercise without diet control, however, may not burn enough calories to loose weight.       Start walking three days a week 10-20 minutes at a time    Work towards walking thirty minutes five days a week     Eventually, increase the speed of your walking for 1-2 minutes at time    In addition, we recommend that you review healthy lifestyles and methods for weight loss available through the National Institutes of Health patient information sites:  http://win.niddk.nih.gov/publications/index.htm    And look into health and wellness programs that may be available through your health insurance provider, employer, local community center, or mauricio club.    Weight management plan: Patient was referred to their PCP to discuss a diet and exercise plan.

## 2020-09-28 ENCOUNTER — VIRTUAL VISIT (OUTPATIENT)
Dept: SLEEP MEDICINE | Facility: CLINIC | Age: 50
End: 2020-09-28
Attending: INTERNAL MEDICINE
Payer: COMMERCIAL

## 2020-09-28 DIAGNOSIS — F51.04 CHRONIC INSOMNIA: ICD-10-CM

## 2020-09-28 DIAGNOSIS — G25.81 RESTLESS LEG SYNDROME: Primary | ICD-10-CM

## 2020-09-28 DIAGNOSIS — G47.33 OSA (OBSTRUCTIVE SLEEP APNEA): ICD-10-CM

## 2020-09-28 PROCEDURE — 99244 OFF/OP CNSLTJ NEW/EST MOD 40: CPT | Mod: 95 | Performed by: PHYSICIAN ASSISTANT

## 2020-09-28 RX ORDER — ROPINIROLE 0.5 MG/1
TABLET, FILM COATED ORAL
Qty: 20 TABLET | Refills: 0 | Status: SHIPPED | OUTPATIENT
Start: 2020-09-28 | End: 2020-11-19

## 2020-09-28 RX ORDER — PREGABALIN 50 MG/1
CAPSULE ORAL
Qty: 150 CAPSULE | Refills: 0 | Status: SHIPPED | OUTPATIENT
Start: 2020-09-28 | End: 2020-10-20

## 2020-09-30 ENCOUNTER — HOSPITAL ENCOUNTER (OUTPATIENT)
Dept: LAB | Facility: CLINIC | Age: 50
Discharge: HOME | End: 2020-09-30
Attending: STUDENT IN AN ORGANIZED HEALTH CARE EDUCATION/TRAINING PROGRAM | Admitting: INTERNAL MEDICINE
Payer: COMMERCIAL

## 2020-09-30 DIAGNOSIS — M25.812 SHOULDER IMPINGEMENT, LEFT: ICD-10-CM

## 2020-09-30 DIAGNOSIS — Z79.899 HIGH RISK MEDICATION USE: ICD-10-CM

## 2020-09-30 DIAGNOSIS — D50.9 IRON DEFICIENCY ANEMIA, UNSPECIFIED IRON DEFICIENCY ANEMIA TYPE: ICD-10-CM

## 2020-09-30 DIAGNOSIS — M19.90 INFLAMMATORY ARTHRITIS: ICD-10-CM

## 2020-09-30 DIAGNOSIS — G25.81 RESTLESS LEG SYNDROME: ICD-10-CM

## 2020-09-30 DIAGNOSIS — R94.4 DECREASED GFR: Primary | ICD-10-CM

## 2020-09-30 LAB
ALBUMIN SERPL-MCNC: 3.5 G/DL (ref 3.4–5)
ALT SERPL W P-5'-P-CCNC: 14 U/L (ref 0–50)
AST SERPL W P-5'-P-CCNC: 12 U/L (ref 0–45)
CREAT SERPL-MCNC: 1.1 MG/DL (ref 0.52–1.04)
CRP SERPL-MCNC: 19 MG/L (ref 0–8)
ERYTHROCYTE [DISTWIDTH] IN BLOOD BY AUTOMATED COUNT: 13 % (ref 10–15)
FERRITIN SERPL-MCNC: 149 NG/ML (ref 8–252)
GFR SERPL CREATININE-BSD FRML MDRD: 59 ML/MIN/{1.73_M2}
HCT VFR BLD AUTO: 42.3 % (ref 35–47)
HGB BLD-MCNC: 13.6 G/DL (ref 11.7–15.7)
MCH RBC QN AUTO: 30.8 PG (ref 26.5–33)
MCHC RBC AUTO-ENTMCNC: 32.2 G/DL (ref 31.5–36.5)
MCV RBC AUTO: 96 FL (ref 78–100)
PLATELET # BLD AUTO: 266 10E9/L (ref 150–450)
RBC # BLD AUTO: 4.41 10E12/L (ref 3.8–5.2)
WBC # BLD AUTO: 10.6 10E9/L (ref 4–11)

## 2020-09-30 PROCEDURE — 84450 TRANSFERASE (AST) (SGOT): CPT | Performed by: INTERNAL MEDICINE

## 2020-09-30 PROCEDURE — 85027 COMPLETE CBC AUTOMATED: CPT | Performed by: INTERNAL MEDICINE

## 2020-09-30 PROCEDURE — 82040 ASSAY OF SERUM ALBUMIN: CPT | Performed by: INTERNAL MEDICINE

## 2020-09-30 PROCEDURE — 82565 ASSAY OF CREATININE: CPT | Performed by: INTERNAL MEDICINE

## 2020-09-30 PROCEDURE — 86140 C-REACTIVE PROTEIN: CPT | Performed by: INTERNAL MEDICINE

## 2020-09-30 PROCEDURE — 84460 ALANINE AMINO (ALT) (SGPT): CPT | Performed by: INTERNAL MEDICINE

## 2020-09-30 PROCEDURE — 36415 COLL VENOUS BLD VENIPUNCTURE: CPT | Performed by: INTERNAL MEDICINE

## 2020-09-30 PROCEDURE — 82728 ASSAY OF FERRITIN: CPT | Performed by: INTERNAL MEDICINE

## 2020-10-10 DIAGNOSIS — G25.81 RESTLESS LEG SYNDROME: ICD-10-CM

## 2020-10-13 RX ORDER — ROPINIROLE 2 MG/1
4 TABLET, FILM COATED ORAL
Qty: 60 TABLET | Refills: 0 | OUTPATIENT
Start: 2020-10-13

## 2020-10-20 ENCOUNTER — VIRTUAL VISIT (OUTPATIENT)
Dept: ANESTHESIOLOGY | Facility: CLINIC | Age: 50
End: 2020-10-20
Payer: COMMERCIAL

## 2020-10-20 DIAGNOSIS — G25.81 RESTLESS LEG SYNDROME: ICD-10-CM

## 2020-10-20 DIAGNOSIS — M47.816 LUMBAR SPONDYLOSIS: Primary | ICD-10-CM

## 2020-10-20 PROCEDURE — 99214 OFFICE O/P EST MOD 30 MIN: CPT | Mod: 95 | Performed by: ANESTHESIOLOGY

## 2020-10-20 RX ORDER — PREGABALIN 50 MG/1
CAPSULE ORAL
Qty: 150 CAPSULE | Refills: 0 | Status: SHIPPED | OUTPATIENT
Start: 2020-10-20 | End: 2020-12-29 | Stop reason: DRUGHIGH

## 2020-10-20 RX ORDER — LIDOCAINE 40 MG/G
CREAM TOPICAL
Status: CANCELLED | OUTPATIENT
Start: 2020-10-20

## 2020-10-20 ASSESSMENT — PAIN SCALES - GENERAL: PAINLEVEL: MODERATE PAIN (4)

## 2020-10-20 NOTE — PROGRESS NOTES
"Zayra Ramirez is a 50 year old female who is being evaluated via a billable video visit.      The patient has been notified of following:     \"This video visit will be conducted via a call between you and your physician/provider. We have found that certain health care needs can be provided without the need for an in-person physical exam.  This service lets us provide the care you need with a video conversation.  If a prescription is necessary we can send it directly to your pharmacy.  If lab work is needed we can place an order for that and you can then stop by our lab to have the test done at a later time.    Video visits are billed at different rates depending on your insurance coverage.  Please reach out to your insurance provider with any questions.    If during the course of the call the physician/provider feels a video visit is not appropriate, you will not be charged for this service.\"    Patient has given verbal consent for Video visit? Yes  How would you like to obtain your AVS? MyChart  If you are dropped from the video visit, the video invite should be resent to: Text to cell phone: [phone]  Will anyone else be joining your video visit? No        Indira Escamilla CMA          "

## 2020-10-20 NOTE — PATIENT INSTRUCTIONS
Medications:    pregabalin (LYRICA) 50 MG capsule- Take 50 mg by mouth at midday and evening.   You may increase each dose by 50 mg every 5 days as needed up to 150 mg twice daily    For refills of medications - You MUST call (Or MyChart) the clinic DIRECTLY and at least 7 days before you run out of your medication.      Referrals:    ***    Imaging:    ***    Procedures:    Call to schedule your procedure: [phone], option #2    Right lumbar medial branch nerve radiofrequency ablation right L2,3,4,5 nerves supplying the right L3-4, L4-5 and L5-S1 facet joints    Your pre-procedure instructions are below, please call our clinic if you have any questions.    Treatment planning:    ***      Recommended Follow up:      ***       Please call [phone], option #1 to schedule your clinic appointment if you don't already have an appointment scheduled.        To speak with a nurse, schedule/reschedule/cancel a clinic appointment, or request a medication refill call: [phone], option #1.    You can also reach us by INXPO: [email or web address]

## 2020-10-20 NOTE — LETTER
"10/20/2020       RE: Zayra Ramirez  [address] [place] [address]     Dear Colleague,    Thank you for referring your patient, Zayra Ramirez, to the [facility] [place] at [facility]. Please see a copy of my visit note below.    Zayra Ramirez is a 50 year old female who is being evaluated via a billable video visit.      The patient has been notified of following:     \"This video visit will be conducted via a call between you and your physician/provider. We have found that certain health care needs can be provided without the need for an in-person physical exam.  This service lets us provide the care you need with a video conversation.  If a prescription is necessary we can send it directly to your pharmacy.  If lab work is needed we can place an order for that and you can then stop by our lab to have the test done at a later time.    Video visits are billed at different rates depending on your insurance coverage.  Please reach out to your insurance provider with any questions.    If during the course of the call the physician/provider feels a video visit is not appropriate, you will not be charged for this service.\"    Patient has given verbal consent for Video visit? Yes  How would you like to obtain your AVS? MyChart  If you are dropped from the video visit, the video invite should be resent to: Text to cell phone: [phone]  Will anyone else be joining your video visit? Regina Escamilla CMA            Zayra Ramirez is a 50 year old female who is being evaluated via a billable video visit.      The patient has been notified of following:     \"This video visit will be conducted via a call between you and your physician/provider. We have found that certain health care needs can be provided without the need for an in-person physical exam.  This service lets us provide the care you need with a " "video conversation.  If a prescription is necessary we can send it directly to your pharmacy.  If lab work is needed we can place an order for that and you can then stop by our lab to have the test done at a later time.    Video visits are billed at different rates depending on your insurance coverage.  Please reach out to your insurance provider with any questions.    If during the course of the call the physician/provider feels a video visit is not appropriate, you will not be charged for this service.\"    Patient has given verbal consent for Video visit? Yes        Video-Visit Details    Type of service:  Video Visit    Video Start Time: 9:10 AM  Video End Time: 9:25 AM    Originating Location (pt. Location): Portal    Distant Location (provider location):  [facility] [place]     Platform used for Video Visit: Rosey Florian MD        Pain clinic follow up note    HPI:   Zayra Ramirez is a 50 year old year old female who presents to the pain clinic with low back pain, s/p lumbar epidural steroid injection    Patient Supplied Answers To the [facility] Pain Questionnaire  [facility] Pain -  Patient Entered Questionnaire/Answers 10/20/2020   What number best describes your pain right now:  0 = No pain  to  10 = Worst pain imaginable 4   How would you describe the pain? numbness, dull, aching, throbbing   Which of the following worsen your pain? lying down, standing, sitting, walking, exercise   Which of the following improve or reduce your pain?  lying down, medication   What number best describes your average pain for the past week:  0 = No pain  to  10 = Worst pain imaginable 4   What number best describes your LOWEST pain in past 24 hours:  0 = No pain  to  10 = Worst pain imaginable 4   What number best describes your WORST pain in past 24 hours:  0 = No pain  to  10 = Worst pain imaginable 4   When is your pain worst? AM, PM, Night   What non-medicine treatments have " you already had for your pain? acupuncture, other nerve blocks   Have you tried treating your pain with medication?  Yes   Are you currently taking medications for your pain? Yes             Pain today is 4  Patient reports some improvement in pain after lumbar epidural steroid injection.  She describes dull and stiff type of feeling on the right lower back which is becoming more persistent.  Pain is worse with extension and twisting.  The patient works as a  and states that her seasonal working is coming to an end.  However in between her injection she is noticing that the right-sided low back pain is becoming persistent.  On further questioning it is revealed that this pain had been treated last year with radiofrequency ablation and she agrees that it is the same pain returning.        ROS:  Review of Systems   All other systems reviewed and are negative.    Answers for HPI/ROS submitted by the patient on 10/20/2020   General Symptoms: No  Skin Symptoms: No  HENT Symptoms: No  EYE SYMPTOMS: No  HEART SYMPTOMS: No  LUNG SYMPTOMS: No  INTESTINAL SYMPTOMS: No  URINARY SYMPTOMS: No  GYNECOLOGIC SYMPTOMS: No  BREAST SYMPTOMS: No  SKELETAL SYMPTOMS: No  BLOOD SYMPTOMS: No  NERVOUS SYSTEM SYMPTOMS: No  MENTAL HEALTH SYMPTOMS: No      Significant Medical History:   Past Medical History:   Diagnosis Date     Allergic rhinitis      Anemia      Arthritis      Asthma     copd     Dental abscess 8-2015     Depressive disorder      Gastroesophageal reflux disease      History of emphysema      Hoarseness      Hypertension      Morbid obesity with BMI of 40.0-44.9, adult (H)      Obstructive sleep apnea      Other chronic pain      Respiratory bronchiolitis interstitial lung disease (H)      Sleep apnea           Past Surgical History:  Past Surgical History:   Procedure Laterality Date     CHOLECYSTECTOMY       COLONOSCOPY       COLONOSCOPY N/A 2/6/2020    Procedure: COLONOSCOPY, WITH POLYPECTOMY AND BIOPSY;  Surgeon:  Julian Mccullough MD;  Location:  GI     ENT SURGERY       ESOPHAGOSCOPY, GASTROSCOPY, DUODENOSCOPY (EGD), COMBINED N/A 2/6/2020    Procedure: ESOPHAGOGASTRODUODENOSCOPY (EGD);  Surgeon: Julian Mccullough MD;  Location:  GI     EXCISE LESION INTRAORAL Bilateral 10/3/2018    Procedure: EXCISE LESION INTRAORAL;  Wide Local Excision Of of Left Oral Cavity Ulcer;  Surgeon: Morro Mijares MD;  Location: [facility] OR     HC DRAIN SKIN ABSCESS SIMPLE/SINGLE  3/16/2012    Procedure:INCISION AND DRAINAGE, ABSCESS, SIMPLE; Surgeon:CHRISTIANO HANCOCK; Location: GI     HEAD & NECK SURGERY       HYSTERECTOMY       INCISION AND DRAINAGE ABDOMEN WASHOUT, COMBINED       INJECT EPIDURAL LUMBAR Right 9/15/2020    Procedure: Lumbar5- sacral 1 epidural steroid injection with fluoroscopy;  Surgeon: Tram Florian MD;  Location:  OR     INJECT SACROILIAC JOINT Bilateral 6/16/2020    Procedure: Bilateral sacroiliac joint steroid injection with fluoroscopy;  Surgeon: Tram Florian MD;  [facility]     LAMINECTOMY THORACIC ONE LEVEL N/A 8/19/2019    Procedure: LAMINECTOMY, SPINE, THORACIC, 11-12 and Part of Lumbar 1, DRAINAGE OF EPIDURAL ABCESS, Epidural Drain Placement X 2;  Surgeon: Yadiel Beal MD;  Location: [facility] OR     RADIO FREQUENCY ABLATION / DESTRUCTION OF SACROILOAC JOINT DORSAL PRIMARY RAMUS Bilateral 12/17/2019    Procedure: Bilateral lumbar radiofrequency ablation with fluoroscopy and intravenous sedation ( Lumbar 2,3,4,5 medial branch nerves for the bilateral lumbar3-4, 4-5 and 5-sacral1 joints.;  Surgeon: Tram Florian MD;  [facility]     spinal cord stimulator  08/08/2019     spinal cord stimulator removal  08/13/2019          Family History:  Family History   Problem Relation Age of Onset     Other Cancer Father         base of tongue cancer at age ~65     Hypertension Father      Back Pain Father      Restless Leg Syndrome Father      Asthma Mother      Restless Leg Syndrome  Mother      Restless Leg Syndrome Sister           Social History:  Social History     Socioeconomic History     Marital status: Single     Spouse name: Not on file     Number of children: Not on file     Years of education: Not on file     Highest education level: Not on file   Occupational History     Not on file   Social Needs     Financial resource strain: Not on file     Food insecurity     Worry: Not on file     Inability: Not on file     Transportation needs     Medical: Not on file     Non-medical: Not on file   Tobacco Use     Smoking status: Current Every Day Smoker     Packs/day: 1.00     Years: 30.00     Pack years: 30.00     Types: Cigarettes     Start date: 1/1/1996     Smokeless tobacco: Never Used     Tobacco comment: has tried the patch   Substance and Sexual Activity     Alcohol use: No     Binge frequency: Monthly     Drug use: Yes     Types: Marijuana     Comment: very rarely      Sexual activity: Never   Lifestyle     Physical activity     Days per week: Not on file     Minutes per session: Not on file     Stress: Not on file   Relationships     Social connections     Talks on phone: Not on file     Gets together: Not on file     Attends [employer] service: Not on file     Active member of club or organization: Not on file     Attends meetings of clubs or organizations: Not on file     Relationship status: Not on file     Intimate partner violence     Fear of current or ex partner: Not on file     Emotionally abused: Not on file     Physically abused: Not on file     Forced sexual activity: Not on file   Other Topics Concern     Parent/sibling w/ CABG, MI or angioplasty before 65F 55M? Not Asked   Social History Narrative     Not on file     Social History     Social History Narrative     Not on file          Allergies:  Allergies   Allergen Reactions     Bee Venom Anaphylaxis     Bees      Doxycycline Anaphylaxis     Patient thinks it may have been just nausea and vomiting, however unable to  confirm     Erythromycin Anaphylaxis and Shortness Of Breath     Other reaction(s): Vomiting     Hydrocodone-Acetaminophen Itching       Current Medications:   Current Outpatient Medications   Medication Sig Dispense Refill     albuterol (PROAIR HFA, PROVENTIL HFA, VENTOLIN HFA) 108 (90 BASE) MCG/ACT inhaler Inhale 2 puffs into the lungs every 6 hours as needed for shortness of breath / dyspnea or wheezing (PT last dose 1.23.2020)        ARIPiprazole (ABILIFY) 15 MG tablet Take 15 mg by mouth At Bedtime        busPIRone HCl (BUSPAR) 30 MG tablet Take 15 mg by mouth At Bedtime       cholecalciferol ( ULTRA STRENGTH) 2000 units CAPS TAKE ONE CAPSULE BY MOUTH DAILY IN AM       cyclobenzaprine (FLEXERIL) 10 MG tablet Take 1 tablet (10 mg) by mouth 3 times daily (Patient taking differently: Take 10 mg by mouth At Bedtime ) 30 tablet 0     DULoxetine (CYMBALTA) 60 MG capsule Take 120 mg by mouth At Bedtime        EPINEPHrine (EPIPEN/ADRENACLICK/OR ANY BX GENERIC EQUIV) 0.3 MG/0.3ML injection 2-pack INJECT 0.3ML INTO THE MUSCLE ONCE AS NEEDED FOR ANAPHYLAXIS       ferrous sulfate (FEROSUL) 325 (65 Fe) MG tablet Take 325 mg by mouth daily (with breakfast)   0     fluticasone-salmeterol (ADVAIR) 500-50 MCG/DOSE diskus inhaler Inhale 1 puff into the lungs 2 times daily        folic acid (FOLVITE) 1 MG tablet Take 1 tablet (1 mg) by mouth daily 90 tablet 3     lisinopril-hydrochlorothiazide (PRINZIDE/ZESTORETIC) 20-25 MG per tablet Take 1 tablet by mouth daily (Patient taking differently: Take 1 tablet by mouth every evening ) 30 tablet 0     methotrexate 2.5 MG tablet TAKE 9 TABLETS BY MOUTH ONCE WEEKLY 108 tablet 3     montelukast (SINGULAIR) 10 MG tablet Take 10 mg by mouth At Bedtime       nystatin (MYCOSTATIN) 968304 UNIT/GM external cream Apply topically 2 times daily 30 g 3     omeprazole (PRILOSEC) 40 MG DR capsule Take 40 mg by mouth as needed (PT last dose appr 2 weeks ago)       OXYGEN-HELIUM IN 2-3L PRN during  the day, 3L @ night       pregabalin (LYRICA) 50 MG capsule Take 50 mg by mouth at midday and evening. May increase each dose by 50 mg every 5 days as needed up to 150 mg twice daily. 150 capsule 0     rOPINIRole (REQUIP) 0.5 MG tablet Take 1.5 mg (one 0.5 mg tab with 1/2 of a 2 mg tab) by mouth twice daily. Reduce each dose by 0.5 mg every 5 days until off of ropinirole 20 tablet 0     rOPINIRole (REQUIP) 2 MG tablet Take 2 tablets (4 mg) by mouth nightly as needed (for restless leg syndrome) 60 tablet 0     traMADol (ULTRAM) 50 MG tablet Take 1-2 tablets ( mg) by mouth every 8 hours as needed for moderate to severe pain 180 tablet 0     vitamin C 500 MG TABS Take 500 mg by mouth daily (with lunch)        zinc sulfate (ZINCATE) 220 (50 Zn) MG capsule Take 220 mg by mouth daily (with lunch)            Physical Exam:     LMP  (LMP Unknown)     General Appearance: No distress, seated comfortably  Mood: Euthymic  HE ENT: Non constricted pupils  Respiratory: Non labored breathing    Skin: No rashes over exposed skin      ASSESSMENT AND PLAN:     Encounter Diagnosis:  1.  Right L4 lumbar radiculopathy    2.  Lumbar spondylosis  3. Sacroiliac dysfunction, right  4. S/P epidural abscess with laminectomy and drainage  5. Lateral femoral cutaneous nerve entrapment right            Zayra Ramirez is a 50 year old year old female  who presents to the pain clinic with low back pain worse on the right side which is becoming more persistent, s/p lumbar epidural steroid injection with some reduction in pain    RECOMMENDATIONS:     1. Medications: We are prescribing the patient to continue pregabalin and tramadol as needed. Dosing, side effects, risks/benefits/alternatives were discussed with the patient in detail.    2. Procedure: We are scheduling the patient for Right lumbar medial branch nerve radiofrequency ablation right L2,3,4,5 nerves supplying the right L3-4, L4-5 and L5-S1 facet joints in the procedure suite.  Risks/benefits/alternatives were discussed.     3.  Had a brief discussion with the patient about her pain and that if the neuropathic pain in the thigh persists and becomes resistant to epidural steroid injections we may be at the point of exploring spinal cord stimulator trial again.  The patient is understandably reluctant to pursue a spinal cord stimulator trial at this time.  I did discuss with her that we will do a series of blood test, a repeat MRI of her spine and possibly an ID consult if this were to be pursued    4.  I have placed an order for EMG of the right leg for lumbar radiculopathy versus lateral femoral cutaneous nerve    Follow up: 4 weeks after radiofrequency ablation                  Again, thank you for allowing me to participate in the care of your patient.      Sincerely,    Tram Florian MD

## 2020-10-20 NOTE — LETTER
10/20/2020       RE: Zayra Ramirez  [address] [place] [address]     Dear Colleague,    Thank you for referring your patient, Zayra Ramirez, to the [facility]IVE [facility]NAGEMENT [place] at [facility]. Please see a copy of my visit note below.    Pain clinic follow up note    HPI:   Zayra Ramirez is a 50 year old year old female who presents to the pain clinic with low back pain, s/p lumbar epidural steroid injection    Patient Supplied Answers To the [facility] Pain Questionnaire  [facility] -  Patient Entered Questionnaire/Answers 10/20/2020   What number best describes your pain right now:  0 = No pain  to  10 = Worst pain imaginable 4   How would you describe the pain? numbness, dull, aching, throbbing   Which of the following worsen your pain? lying down, standing, sitting, walking, exercise   Which of the following improve or reduce your pain?  lying down, medication   What number best describes your average pain for the past week:  0 = No pain  to  10 = Worst pain imaginable 4   What number best describes your LOWEST pain in past 24 hours:  0 = No pain  to  10 = Worst pain imaginable 4   What number best describes your WORST pain in past 24 hours:  0 = No pain  to  10 = Worst pain imaginable 4   When is your pain worst? AM, PM, Night   What non-medicine treatments have you already had for your pain? acupuncture, other nerve blocks   Have you tried treating your pain with medication?  Yes   Are you currently taking medications for your pain? Yes         Pain today is 4  Patient reports some improvement in pain after lumbar epidural steroid injection.  She describes dull and stiff type of feeling on the right lower back which is becoming more persistent.  Pain is worse with extension and twisting.  The patient works as a  and states that her seasonal working is coming to an end.  However in between her injection she is  noticing that the right-sided low back pain is becoming persistent.  On further questioning it is revealed that this pain had been treated last year with radiofrequency ablation and she agrees that it is the same pain returning.    ROS:  Review of Systems   All other systems reviewed and are negative.    Answers for HPI/ROS submitted by the patient on 10/20/2020   General Symptoms: No  Skin Symptoms: No  HENT Symptoms: No  EYE SYMPTOMS: No  HEART SYMPTOMS: No  LUNG SYMPTOMS: No  INTESTINAL SYMPTOMS: No  URINARY SYMPTOMS: No  GYNECOLOGIC SYMPTOMS: No  BREAST SYMPTOMS: No  SKELETAL SYMPTOMS: No  BLOOD SYMPTOMS: No  NERVOUS SYSTEM SYMPTOMS: No  MENTAL HEALTH SYMPTOMS: No    Significant Medical History:   Past Medical History:   Diagnosis Date     Allergic rhinitis      Anemia      Arthritis      Asthma     copd     Dental abscess 8-2015     Depressive disorder      Gastroesophageal reflux disease      History of emphysema      Hoarseness      Hypertension      Morbid obesity with BMI of 40.0-44.9, adult (H)      Obstructive sleep apnea      Other chronic pain      Respiratory bronchiolitis interstitial lung disease (H)      Sleep apnea      Past Surgical History:  Past Surgical History:   Procedure Laterality Date     CHOLECYSTECTOMY       COLONOSCOPY       COLONOSCOPY N/A 2/6/2020    Procedure: COLONOSCOPY, WITH POLYPECTOMY AND BIOPSY;  Surgeon: Julian Mccullough MD;  Location:  GI     ENT SURGERY       ESOPHAGOSCOPY, GASTROSCOPY, DUODENOSCOPY (EGD), COMBINED N/A 2/6/2020    Procedure: ESOPHAGOGASTRODUODENOSCOPY (EGD);  Surgeon: Julian Mccullough MD;  Location:  GI     EXCISE LESION INTRAORAL Bilateral 10/3/2018    Procedure: EXCISE LESION INTRAORAL;  Wide Local Excision Of of Left Oral Cavity Ulcer;  Surgeon: Morro Mijares MD;  Location:  OR      DRAIN SKIN ABSCESS SIMPLE/SINGLE  3/16/2012    Procedure:INCISION AND DRAINAGE, ABSCESS, SIMPLE; Surgeon:CHRISTIANO HANCOCK; Location: GI      HEAD & NECK SURGERY       HYSTERECTOMY       INCISION AND DRAINAGE ABDOMEN WASHOUT, COMBINED       INJECT EPIDURAL LUMBAR Right 9/15/2020    Procedure: Lumbar5- sacral 1 epidural steroid injection with fluoroscopy;  Surgeon: Tram Florian MD;  [facility]     INJECT SACROILIAC JOINT Bilateral 6/16/2020    Procedure: Bilateral sacroiliac joint steroid injection with fluoroscopy;  Surgeon: Tram Florian MD;  [facility]     LAMINECTOMY THORACIC ONE LEVEL N/A 8/19/2019    Procedure: LAMINECTOMY, SPINE, THORACIC, 11-12 and Part of Lumbar 1, DRAINAGE OF EPIDURAL ABCESS, Epidural Drain Placement X 2;  Surgeon: Yadiel Beal MD;  Location: [facility] OR     RADIO FREQUENCY ABLATION / DESTRUCTION OF SACROILOAC JOINT DORSAL PRIMARY RAMUS Bilateral 12/17/2019    Procedure: Bilateral lumbar radiofrequency ablation with fluoroscopy and intravenous sedation ( Lumbar 2,3,4,5 medial branch nerves for the bilateral lumbar3-4, 4-5 and 5-sacral1 joints.;  Surgeon: Tram Florian MD;  [facility]     spinal cord stimulator  08/08/2019     spinal cord stimulator removal  08/13/2019     Family History:  Family History   Problem Relation Age of Onset     Other Cancer Father         base of tongue cancer at age ~65     Hypertension Father      Back Pain Father      Restless Leg Syndrome Father      Asthma Mother      Restless Leg Syndrome Mother      Restless Leg Syndrome Sister      Social History:  Social History     Socioeconomic History     Marital status: Single     Spouse name: Not on file     Number of children: Not on file     Years of education: Not on file     Highest education level: Not on file   Occupational History     Not on file   Social Needs     Financial resource strain: Not on file     Food insecurity     Worry: Not on file     Inability: Not on file     Transportation needs     Medical: Not on file     Non-medical: Not on file   Tobacco Use     Smoking status: Current Every Day Smoker      Packs/day: 1.00     Years: 30.00     Pack years: 30.00     Types: Cigarettes     Start date: 1/1/1996     Smokeless tobacco: Never Used     Tobacco comment: has tried the patch   Substance and Sexual Activity     Alcohol use: No     Binge frequency: Monthly     Drug use: Yes     Types: Marijuana     Comment: very rarely      Sexual activity: Never   Lifestyle     Physical activity     Days per week: Not on file     Minutes per session: Not on file     Stress: Not on file   Relationships     Social connections     Talks on phone: Not on file     Gets together: Not on file     Attends [employer] service: Not on file     Active member of club or organization: Not on file     Attends meetings of clubs or organizations: Not on file     Relationship status: Not on file     Intimate partner violence     Fear of current or ex partner: Not on file     Emotionally abused: Not on file     Physically abused: Not on file     Forced sexual activity: Not on file   Other Topics Concern     Parent/sibling w/ CABG, MI or angioplasty before 65F 55M? Not Asked   Social History Narrative     Not on file     Social History     Social History Narrative     Not on file     Allergies:  Allergies   Allergen Reactions     Bee Venom Anaphylaxis     Bees      Doxycycline Anaphylaxis     Patient thinks it may have been just nausea and vomiting, however unable to confirm     Erythromycin Anaphylaxis and Shortness Of Breath     Other reaction(s): Vomiting     Hydrocodone-Acetaminophen Itching     Current Medications:   Current Outpatient Medications   Medication Sig Dispense Refill     albuterol (PROAIR HFA, PROVENTIL HFA, VENTOLIN HFA) 108 (90 BASE) MCG/ACT inhaler Inhale 2 puffs into the lungs every 6 hours as needed for shortness of breath / dyspnea or wheezing (PT last dose 1.23.2020)        ARIPiprazole (ABILIFY) 15 MG tablet Take 15 mg by mouth At Bedtime        busPIRone HCl (BUSPAR) 30 MG tablet Take 15 mg by mouth At Bedtime        cholecalciferol ( ULTRA STRENGTH) 2000 units CAPS TAKE ONE CAPSULE BY MOUTH DAILY IN AM       cyclobenzaprine (FLEXERIL) 10 MG tablet Take 1 tablet (10 mg) by mouth 3 times daily (Patient taking differently: Take 10 mg by mouth At Bedtime ) 30 tablet 0     DULoxetine (CYMBALTA) 60 MG capsule Take 120 mg by mouth At Bedtime        EPINEPHrine (EPIPEN/ADRENACLICK/OR ANY BX GENERIC EQUIV) 0.3 MG/0.3ML injection 2-pack INJECT 0.3ML INTO THE MUSCLE ONCE AS NEEDED FOR ANAPHYLAXIS       ferrous sulfate (FEROSUL) 325 (65 Fe) MG tablet Take 325 mg by mouth daily (with breakfast)   0     fluticasone-salmeterol (ADVAIR) 500-50 MCG/DOSE diskus inhaler Inhale 1 puff into the lungs 2 times daily        folic acid (FOLVITE) 1 MG tablet Take 1 tablet (1 mg) by mouth daily 90 tablet 3     lisinopril-hydrochlorothiazide (PRINZIDE/ZESTORETIC) 20-25 MG per tablet Take 1 tablet by mouth daily (Patient taking differently: Take 1 tablet by mouth every evening ) 30 tablet 0     methotrexate 2.5 MG tablet TAKE 9 TABLETS BY MOUTH ONCE WEEKLY 108 tablet 3     montelukast (SINGULAIR) 10 MG tablet Take 10 mg by mouth At Bedtime       nystatin (MYCOSTATIN) 310250 UNIT/GM external cream Apply topically 2 times daily 30 g 3     omeprazole (PRILOSEC) 40 MG DR capsule Take 40 mg by mouth as needed (PT last dose appr 2 weeks ago)       OXYGEN-HELIUM IN 2-3L PRN during the day, 3L @ night       pregabalin (LYRICA) 50 MG capsule Take 50 mg by mouth at midday and evening. May increase each dose by 50 mg every 5 days as needed up to 150 mg twice daily. 150 capsule 0     rOPINIRole (REQUIP) 0.5 MG tablet Take 1.5 mg (one 0.5 mg tab with 1/2 of a 2 mg tab) by mouth twice daily. Reduce each dose by 0.5 mg every 5 days until off of ropinirole 20 tablet 0     rOPINIRole (REQUIP) 2 MG tablet Take 2 tablets (4 mg) by mouth nightly as needed (for restless leg syndrome) 60 tablet 0     traMADol (ULTRAM) 50 MG tablet Take 1-2 tablets ( mg) by mouth  every 8 hours as needed for moderate to severe pain 180 tablet 0     vitamin C 500 MG TABS Take 500 mg by mouth daily (with lunch)        zinc sulfate (ZINCATE) 220 (50 Zn) MG capsule Take 220 mg by mouth daily (with lunch)        Physical Exam:  LMP  (LMP Unknown)     General Appearance: No distress, seated comfortably  Mood: Euthymic  HE ENT: Non constricted pupils  Respiratory: Non labored breathing    Skin: No rashes over exposed skin    ASSESSMENT AND PLAN:     Encounter Diagnosis:  1.  Right L4 lumbar radiculopathy    2.  Lumbar spondylosis  3. Sacroiliac dysfunction, right  4. S/P epidural abscess with laminectomy and drainage  5. Lateral femoral cutaneous nerve entrapment right     Zayra Ramirez is a 50 year old year old female  who presents to the pain clinic with low back pain worse on the right side which is becoming more persistent, s/p lumbar epidural steroid injection with some reduction in pain    RECOMMENDATIONS:  1. Medications: We are prescribing the patient to continue pregabalin and tramadol as needed. Dosing, side effects, risks/benefits/alternatives were discussed with the patient in detail.    2. Procedure: We are scheduling the patient for Right lumbar medial branch nerve radiofrequency ablation right L2,3,4,5 nerves supplying the right L3-4, L4-5 and L5-S1 facet joints in the procedure suite. Risks/benefits/alternatives were discussed.     3.  Had a brief discussion with the patient about her pain and that if the neuropathic pain in the thigh persists and becomes resistant to epidural steroid injections we may be at the point of exploring spinal cord stimulator trial again.  The patient is understandably reluctant to pursue a spinal cord stimulator trial at this time.  I did discuss with her that we will do a series of blood test, a repeat MRI of her spine and possibly an ID consult if this were to be pursued    4.  I have placed an order for EMG of the right leg for lumbar radiculopathy  versus lateral femoral cutaneous nerve    Follow up: 4 weeks after radiofrequency ablation    Again, thank you for allowing me to participate in the care of your patient.  Sincerely,    Tram Florian MD

## 2020-10-20 NOTE — PROGRESS NOTES
"Zayra Ramirez is a 50 year old female who is being evaluated via a billable video visit.      The patient has been notified of following:     \"This video visit will be conducted via a call between you and your physician/provider. We have found that certain health care needs can be provided without the need for an in-person physical exam.  This service lets us provide the care you need with a video conversation.  If a prescription is necessary we can send it directly to your pharmacy.  If lab work is needed we can place an order for that and you can then stop by our lab to have the test done at a later time.    Video visits are billed at different rates depending on your insurance coverage.  Please reach out to your insurance provider with any questions.    If during the course of the call the physician/provider feels a video visit is not appropriate, you will not be charged for this service.\"    Patient has given verbal consent for Video visit? Yes        Video-Visit Details    Type of service:  Video Visit    Video Start Time: 9:10 AM  Video End Time: 9:25 AM    Originating Location (pt. Location): [place]    Distant Location (provider location):  [facility] [place]     Platform used for Video Visit: Rosey Florian MD        Pain clinic follow up note    HPI:   Zayra Ramirez is a 50 year old year old female who presents to the pain clinic with low back pain, s/p lumbar epidural steroid injection    Patient Supplied Answers To the [facility] Pain Questionnaire  [facility] -  Patient Entered Questionnaire/Answers 10/20/2020   What number best describes your pain right now:  0 = No pain  to  10 = Worst pain imaginable 4   How would you describe the pain? numbness, dull, aching, throbbing   Which of the following worsen your pain? lying down, standing, sitting, walking, exercise   Which of the following improve or reduce your pain?  lying down, medication   What number " best describes your average pain for the past week:  0 = No pain  to  10 = Worst pain imaginable 4   What number best describes your LOWEST pain in past 24 hours:  0 = No pain  to  10 = Worst pain imaginable 4   What number best describes your WORST pain in past 24 hours:  0 = No pain  to  10 = Worst pain imaginable 4   When is your pain worst? AM, PM, Night   What non-medicine treatments have you already had for your pain? acupuncture, other nerve blocks   Have you tried treating your pain with medication?  Yes   Are you currently taking medications for your pain? Yes             Pain today is 4  Patient reports some improvement in pain after lumbar epidural steroid injection.  She describes dull and stiff type of feeling on the right lower back which is becoming more persistent.  Pain is worse with extension and twisting.  The patient works as a  and states that her seasonal working is coming to an end.  However in between her injection she is noticing that the right-sided low back pain is becoming persistent.  On further questioning it is revealed that this pain had been treated last year with radiofrequency ablation and she agrees that it is the same pain returning.        ROS:  Review of Systems   All other systems reviewed and are negative.    Answers for HPI/ROS submitted by the patient on 10/20/2020   General Symptoms: No  Skin Symptoms: No  HENT Symptoms: No  EYE SYMPTOMS: No  HEART SYMPTOMS: No  LUNG SYMPTOMS: No  INTESTINAL SYMPTOMS: No  URINARY SYMPTOMS: No  GYNECOLOGIC SYMPTOMS: No  BREAST SYMPTOMS: No  SKELETAL SYMPTOMS: No  BLOOD SYMPTOMS: No  NERVOUS SYSTEM SYMPTOMS: No  MENTAL HEALTH SYMPTOMS: No      Significant Medical History:   Past Medical History:   Diagnosis Date     Allergic rhinitis      Anemia      Arthritis      Asthma     copd     Dental abscess 8-2015     Depressive disorder      Gastroesophageal reflux disease      History of emphysema      Hoarseness      Hypertension       Morbid obesity with BMI of 40.0-44.9, adult (H)      Obstructive sleep apnea      Other chronic pain      Respiratory bronchiolitis interstitial lung disease (H)      Sleep apnea           Past Surgical History:  Past Surgical History:   Procedure Laterality Date     CHOLECYSTECTOMY       COLONOSCOPY       COLONOSCOPY N/A 2/6/2020    Procedure: COLONOSCOPY, WITH POLYPECTOMY AND BIOPSY;  Surgeon: Julian Mccullough MD;  Location:  GI     ENT SURGERY       ESOPHAGOSCOPY, GASTROSCOPY, DUODENOSCOPY (EGD), COMBINED N/A 2/6/2020    Procedure: ESOPHAGOGASTRODUODENOSCOPY (EGD);  Surgeon: Julian Mccullough MD;  Location:  GI     EXCISE LESION INTRAORAL Bilateral 10/3/2018    Procedure: EXCISE LESION INTRAORAL;  Wide Local Excision Of of Left Oral Cavity Ulcer;  Surgeon: Morro Mijares MD;  Location:  OR     HC DRAIN SKIN ABSCESS SIMPLE/SINGLE  3/16/2012    Procedure:INCISION AND DRAINAGE, ABSCESS, SIMPLE; Surgeon:CHRISTIANO HANCOCK; Location: GI     HEAD & NECK SURGERY       HYSTERECTOMY       INCISION AND DRAINAGE ABDOMEN WASHOUT, COMBINED       INJECT EPIDURAL LUMBAR Right 9/15/2020    Procedure: Lumbar5- sacral 1 epidural steroid injection with fluoroscopy;  Surgeon: Tram Florian MD;  [facility]     INJECT SACROILIAC JOINT Bilateral 6/16/2020    Procedure: Bilateral sacroiliac joint steroid injection with fluoroscopy;  Surgeon: Tram Florian MD;  [facility]     LAMINECTOMY THORACIC ONE LEVEL N/A 8/19/2019    Procedure: LAMINECTOMY, SPINE, THORACIC, 11-12 and Part of Lumbar 1, DRAINAGE OF EPIDURAL ABCESS, Epidural Drain Placement X 2;  Surgeon: Yadiel Beal MD;  Location: [facility] OR     RADIO FREQUENCY ABLATION / DESTRUCTION OF SACROILOAC JOINT DORSAL PRIMARY RAMUS Bilateral 12/17/2019    Procedure: Bilateral lumbar radiofrequency ablation with fluoroscopy and intravenous sedation ( Lumbar 2,3,4,5 medial branch nerves for the bilateral lumbar3-4, 4-5 and 5-sacral1 joints.;   Surgeon: Tram Florian MD;  Location:  OR     spinal cord stimulator  08/08/2019     spinal cord stimulator removal  08/13/2019          Family History:  Family History   Problem Relation Age of Onset     Other Cancer Father         base of tongue cancer at age ~65     Hypertension Father      Back Pain Father      Restless Leg Syndrome Father      Asthma Mother      Restless Leg Syndrome Mother      Restless Leg Syndrome Sister           Social History:  Social History     Socioeconomic History     Marital status: Single     Spouse name: Not on file     Number of children: Not on file     Years of education: Not on file     Highest education level: Not on file   Occupational History     Not on file   Social Needs     Financial resource strain: Not on file     Food insecurity     Worry: Not on file     Inability: Not on file     Transportation needs     Medical: Not on file     Non-medical: Not on file   Tobacco Use     Smoking status: Current Every Day Smoker     Packs/day: 1.00     Years: 30.00     Pack years: 30.00     Types: Cigarettes     Start date: 1/1/1996     Smokeless tobacco: Never Used     Tobacco comment: has tried the patch   Substance and Sexual Activity     Alcohol use: No     Binge frequency: Monthly     Drug use: Yes     Types: Marijuana     Comment: very rarely      Sexual activity: Never   Lifestyle     Physical activity     Days per week: Not on file     Minutes per session: Not on file     Stress: Not on file   Relationships     Social connections     Talks on phone: Not on file     Gets together: Not on file     Attends [employer] service: Not on file     Active member of club or organization: Not on file     Attends meetings of clubs or organizations: Not on file     Relationship status: Not on file     Intimate partner violence     Fear of current or ex partner: Not on file     Emotionally abused: Not on file     Physically abused: Not on file     Forced sexual activity: Not on file    Other Topics Concern     Parent/sibling w/ CABG, MI or angioplasty before 65F 55M? Not Asked   Social History Narrative     Not on file     Social History     Social History Narrative     Not on file          Allergies:  Allergies   Allergen Reactions     Bee Venom Anaphylaxis     Bees      Doxycycline Anaphylaxis     Patient thinks it may have been just nausea and vomiting, however unable to confirm     Erythromycin Anaphylaxis and Shortness Of Breath     Other reaction(s): Vomiting     Hydrocodone-Acetaminophen Itching       Current Medications:   Current Outpatient Medications   Medication Sig Dispense Refill     albuterol (PROAIR HFA, PROVENTIL HFA, VENTOLIN HFA) 108 (90 BASE) MCG/ACT inhaler Inhale 2 puffs into the lungs every 6 hours as needed for shortness of breath / dyspnea or wheezing (PT last dose 1.23.2020)        ARIPiprazole (ABILIFY) 15 MG tablet Take 15 mg by mouth At Bedtime        busPIRone HCl (BUSPAR) 30 MG tablet Take 15 mg by mouth At Bedtime       cholecalciferol ( ULTRA STRENGTH) 2000 units CAPS TAKE ONE CAPSULE BY MOUTH DAILY IN AM       cyclobenzaprine (FLEXERIL) 10 MG tablet Take 1 tablet (10 mg) by mouth 3 times daily (Patient taking differently: Take 10 mg by mouth At Bedtime ) 30 tablet 0     DULoxetine (CYMBALTA) 60 MG capsule Take 120 mg by mouth At Bedtime        EPINEPHrine (EPIPEN/ADRENACLICK/OR ANY BX GENERIC EQUIV) 0.3 MG/0.3ML injection 2-pack INJECT 0.3ML INTO THE MUSCLE ONCE AS NEEDED FOR ANAPHYLAXIS       ferrous sulfate (FEROSUL) 325 (65 Fe) MG tablet Take 325 mg by mouth daily (with breakfast)   0     fluticasone-salmeterol (ADVAIR) 500-50 MCG/DOSE diskus inhaler Inhale 1 puff into the lungs 2 times daily        folic acid (FOLVITE) 1 MG tablet Take 1 tablet (1 mg) by mouth daily 90 tablet 3     lisinopril-hydrochlorothiazide (PRINZIDE/ZESTORETIC) 20-25 MG per tablet Take 1 tablet by mouth daily (Patient taking differently: Take 1 tablet by mouth every evening ) 30  tablet 0     methotrexate 2.5 MG tablet TAKE 9 TABLETS BY MOUTH ONCE WEEKLY 108 tablet 3     montelukast (SINGULAIR) 10 MG tablet Take 10 mg by mouth At Bedtime       nystatin (MYCOSTATIN) 748074 UNIT/GM external cream Apply topically 2 times daily 30 g 3     omeprazole (PRILOSEC) 40 MG DR capsule Take 40 mg by mouth as needed (PT last dose appr 2 weeks ago)       OXYGEN-HELIUM IN 2-3L PRN during the day, 3L @ night       pregabalin (LYRICA) 50 MG capsule Take 50 mg by mouth at midday and evening. May increase each dose by 50 mg every 5 days as needed up to 150 mg twice daily. 150 capsule 0     rOPINIRole (REQUIP) 0.5 MG tablet Take 1.5 mg (one 0.5 mg tab with 1/2 of a 2 mg tab) by mouth twice daily. Reduce each dose by 0.5 mg every 5 days until off of ropinirole 20 tablet 0     rOPINIRole (REQUIP) 2 MG tablet Take 2 tablets (4 mg) by mouth nightly as needed (for restless leg syndrome) 60 tablet 0     traMADol (ULTRAM) 50 MG tablet Take 1-2 tablets ( mg) by mouth every 8 hours as needed for moderate to severe pain 180 tablet 0     vitamin C 500 MG TABS Take 500 mg by mouth daily (with lunch)        zinc sulfate (ZINCATE) 220 (50 Zn) MG capsule Take 220 mg by mouth daily (with lunch)            Physical Exam:     LMP  (LMP Unknown)     General Appearance: No distress, seated comfortably  Mood: Euthymic  HE ENT: Non constricted pupils  Respiratory: Non labored breathing    Skin: No rashes over exposed skin      ASSESSMENT AND PLAN:     Encounter Diagnosis:  1.  Right L4 lumbar radiculopathy    2.  Lumbar spondylosis  3. Sacroiliac dysfunction, right  4. S/P epidural abscess with laminectomy and drainage  5. Lateral femoral cutaneous nerve entrapment right            Zayra Ramirez is a 50 year old year old female  who presents to the pain clinic with low back pain worse on the right side which is becoming more persistent, s/p lumbar epidural steroid injection with some reduction in pain    RECOMMENDATIONS:      1. Medications: We are prescribing the patient to continue pregabalin and tramadol as needed. Dosing, side effects, risks/benefits/alternatives were discussed with the patient in detail.    2. Procedure: We are scheduling the patient for Bilateral lumbar medial branch nerve radiofrequency ablation bilateral L2,3,4,5 nerves supplying the bilateral L3-4, L4-5 and L5-S1 facet joints in the procedure suite. Risks/benefits/alternatives were discussed.     3.  Had a brief discussion with the patient about her pain and that if the neuropathic pain in the thigh persists and becomes resistant to epidural steroid injections we may be at the point of exploring spinal cord stimulator trial again.  The patient is understandably reluctant to pursue a spinal cord stimulator trial at this time.  I did discuss with her that we will do a series of blood test, a repeat MRI of her spine and possibly an ID consult if this were to be pursued    4.  I have placed an order for EMG of the right leg for lumbar radiculopathy versus lateral femoral cutaneous nerve    Follow up: 4 weeks after radiofrequency ablation

## 2020-10-22 ENCOUNTER — TELEPHONE (OUTPATIENT)
Dept: SLEEP MEDICINE | Facility: CLINIC | Age: 50
End: 2020-10-22

## 2020-10-22 ENCOUNTER — HOSPITAL ENCOUNTER (OUTPATIENT)
Facility: AMBULATORY SURGERY CENTER | Age: 50
End: 2020-10-22
Attending: ANESTHESIOLOGY
Payer: COMMERCIAL

## 2020-10-22 PROBLEM — M47.816 LUMBAR SPONDYLOSIS: Status: ACTIVE | Noted: 2019-11-04

## 2020-10-22 NOTE — TELEPHONE ENCOUNTER
Pharmacy sent a request for a 90 day rx for ropinirol HCL.      Please send new rx if okay.     [facility]   [address]   [place]       We were trying to wean off of ropinirole and switch to pregabalin. She was supposed to have a follow up a month from her last visit on 9/28. That has not been scheduled. I wrote a MyChart note asking that she call to schedule. I will not refill the ropinirole until I hear from her.  Bennett Goltz, PA-C

## 2020-10-23 DIAGNOSIS — Z11.59 ENCOUNTER FOR SCREENING FOR OTHER VIRAL DISEASES: Primary | ICD-10-CM

## 2020-11-12 ENCOUNTER — OFFICE VISIT (OUTPATIENT)
Dept: NEUROLOGY | Facility: CLINIC | Age: 50
End: 2020-11-12
Attending: ANESTHESIOLOGY
Payer: COMMERCIAL

## 2020-11-12 DIAGNOSIS — R20.0 NUMBNESS: Primary | ICD-10-CM

## 2020-11-12 PROCEDURE — 95886 MUSC TEST DONE W/N TEST COMP: CPT | Performed by: PSYCHIATRY & NEUROLOGY

## 2020-11-12 PROCEDURE — 95908 NRV CNDJ TST 3-4 STUDIES: CPT | Performed by: PSYCHIATRY & NEUROLOGY

## 2020-11-12 PROCEDURE — 95885 MUSC TST DONE W/NERV TST LIM: CPT | Mod: 59 | Performed by: PSYCHIATRY & NEUROLOGY

## 2020-11-12 NOTE — LETTER
11/12/2020       RE: Zayra Ramirez  [address] [place] [address]     Dear Colleague,    Thank you for referring your patient, Zayra Ramirez, to the [facility] [place] at [facility]. Please see a copy of my visit note below.      [facility]  Electrodiagnostic Laboratory    Nerve Conduction & EMG Report    Patient:       Zayra Ramirez  Patient ID:    [id number]  Gender:        Female  YOB: 1970  Age:           50 Years 9 Months      History & Examination: The patient is a 50-year-old female with a history of lumbar laminectomy and epidural abscess.  She currently has right thigh numbness.  We are asked to evaluate for possible high lumbar radiculopathy.    Techniques: Motor conduction studies were done with surface recording electrodes. Sensory conduction studies were performed with surface electrodes, unless indicated otherwise by (n), designating the use of subdermal recording electrodes. Temperature was monitored and recorded throughout the study. Upper extremities were maintained at a temperature of 32 degrees Centigrade or higher.  Lower extremities were maintained at a temperature of 31degrees Centigrade or higher. EMG was done with a concentric needle electrode.        Results: The right sural and right superficial peroneal sensory nerve action potentials are normal.  The right peroneal and right tibial motor conduction studies are normal.  Needle exam revealed minor fibrillation and irritability with mild prolongation of motor unit potential durations in scattered proximal and distal muscles of the right L5 and S1 myotomes.  The L2-L4 myotomes appeared normal on the right.  The lumbar paraspinal muscles were not examined given the prior lumbar surgery.    Interpretation: The EMG is abnormal.  There are scattered EMG changes which would be consistent with mild, chronic right L5-S1 radiculopathies.  There is  no EMG evidence for a right L2-4 radiculopathy.    EMG Physician: Robbi Abdullahi MD           Sensory NCS      Nerve / Sites Rec. Site Onset Peak NP Amp Ref. PP Amp Dist Isaac Ref. Temp     ms ms  V  V  V cm m/s m/s  C   R SURAL - Lat Mall      Calf Ankle 3.18 4.01 12.9 8.0 14.3 14 44.1 38.0 31.5   R SUP PERONEAL      Lat Leg García 2.71 4.01 3.4  4.6 12.5 46.2 38.0 31.5       Motor NCS      Nerve / Sites Rec. Site Lat Ref. Amp Ref. Rel Amp Dist Isaac Ref. Dur. Area Temp.     ms ms mV mV % cm m/s m/s ms %  C   R DEEP PERONEAL - EDB      Ankle EDB 4.64 6.00 7.3 2.5 100 8   7.03 100 31.5      FibHead EDB 10.42  7.2  98.6 27 46.7 38.0 7.50 93.8 31.4      Pop Fos EDB 12.55  7.2  99.6 10 46.8 38.0 7.66 90.7 31.4   R TIBIAL - AH      Ankle AH 4.17 6.00 13.8 4.0 100 8   4.84 100 31.4      Pop Fos AH 11.51  10.4  75.8 37 50.4 38.0 5.78 78.5 31.4       Needle EMG (W)      EMG Summary Table     Spontaneous Recruitment MUAP    IA Fib PSW Fasc H.F. Pattern Amp Dur. PPP   R. TIB ANTERIOR N None None None None N N N N   R. GASTROCN (MED) 1+ 1+ None None None N N 1+ N   R. TIB POSTERIOR 1+ 1+ None None None N 1+ 1+ N   L. GASTROCN (MED) N None None None None N N N N   R. VAST LATERALIS N None None None None N N N N   R. ADD LONGUS N None None None None N N N N   R. T FASCIA PRASAD N None None None None N N N N   R. GLUTEUS MAX N None None None None N 1+ 1+ N                 Again, thank you for allowing me to participate in the care of your patient.      Sincerely,    Robbi Abdullahi MD

## 2020-11-12 NOTE — PROGRESS NOTES
[facility]  Electrodiagnostic Laboratory    Nerve Conduction & EMG Report          Patient:       Zayra Ramirez  Patient ID:    [id number]  Gender:        Female  YOB: 1970  Age:           50 Years 9 Months        History & Examination: The patient is a 50-year-old female with a history of lumbar laminectomy and epidural abscess.  She currently has right thigh numbness.  We are asked to evaluate for possible high lumbar radiculopathy.      Techniques: Motor conduction studies were done with surface recording electrodes. Sensory conduction studies were performed with surface electrodes, unless indicated otherwise by (n), designating the use of subdermal recording electrodes. Temperature was monitored and recorded throughout the study. Upper extremities were maintained at a temperature of 32 degrees Centigrade or higher.  Lower extremities were maintained at a temperature of 31degrees Centigrade or higher. EMG was done with a concentric needle electrode.        Results: The right sural and right superficial peroneal sensory nerve action potentials are normal.  The right peroneal and right tibial motor conduction studies are normal.  Needle exam revealed minor fibrillation and irritability with mild prolongation of motor unit potential durations in scattered proximal and distal muscles of the right L5 and S1 myotomes.  The L2-L4 myotomes appeared normal on the right.  The lumbar paraspinal muscles were not examined given the prior lumbar surgery.      Interpretation: The EMG is abnormal.  There are scattered EMG changes which would be consistent with mild, chronic right L5-S1 radiculopathies.  There is no EMG evidence for a right L2-4 radiculopathy.        EMG Physician: Robbi Abdullahi MD           Sensory NCS      Nerve / Sites Rec. Site Onset Peak NP Amp Ref. PP Amp Dist Isaac Ref. Temp     ms ms  V  V  V cm m/s m/s  C   R SURAL - Lat Mall      Calf Ankle 3.18 4.01 12.9 8.0 14.3 14 44.1  38.0 31.5   R SUP PERONEAL      Lat Leg García 2.71 4.01 3.4  4.6 12.5 46.2 38.0 31.5       Motor NCS      Nerve / Sites Rec. Site Lat Ref. Amp Ref. Rel Amp Dist Isaac Ref. Dur. Area Temp.     ms ms mV mV % cm m/s m/s ms %  C   R DEEP PERONEAL - EDB      Ankle EDB 4.64 6.00 7.3 2.5 100 8   7.03 100 31.5      FibHead EDB 10.42  7.2  98.6 27 46.7 38.0 7.50 93.8 31.4      Pop Fos EDB 12.55  7.2  99.6 10 46.8 38.0 7.66 90.7 31.4   R TIBIAL - AH      Ankle AH 4.17 6.00 13.8 4.0 100 8   4.84 100 31.4      Pop Fos AH 11.51  10.4  75.8 37 50.4 38.0 5.78 78.5 31.4       Needle EMG (W)      EMG Summary Table     Spontaneous Recruitment MUAP    IA Fib PSW Fasc H.F. Pattern Amp Dur. PPP   R. TIB ANTERIOR N None None None None N N N N   R. GASTROCN (MED) 1+ 1+ None None None N N 1+ N   R. TIB POSTERIOR 1+ 1+ None None None N 1+ 1+ N   L. GASTROCN (MED) N None None None None N N N N   R. VAST LATERALIS N None None None None N N N N   R. ADD LONGUS N None None None None N N N N   R. T FASCIA PRASAD N None None None None N N N N   R. GLUTEUS MAX N None None None None N 1+ 1+ N

## 2020-11-14 DIAGNOSIS — Z11.59 ENCOUNTER FOR SCREENING FOR OTHER VIRAL DISEASES: ICD-10-CM

## 2020-11-14 PROCEDURE — U0003 INFECTIOUS AGENT DETECTION BY NUCLEIC ACID (DNA OR RNA); SEVERE ACUTE RESPIRATORY SYNDROME CORONAVIRUS 2 (SARS-COV-2) (CORONAVIRUS DISEASE [COVID-19]), AMPLIFIED PROBE TECHNIQUE, MAKING USE OF HIGH THROUGHPUT TECHNOLOGIES AS DESCRIBED BY CMS-2020-01-R: HCPCS | Performed by: ANESTHESIOLOGY

## 2020-11-15 LAB
SARS-COV-2 RNA SPEC QL NAA+PROBE: NOT DETECTED
SPECIMEN SOURCE: NORMAL

## 2020-11-16 ENCOUNTER — TELEPHONE (OUTPATIENT)
Dept: ANESTHESIOLOGY | Facility: CLINIC | Age: 50
End: 2020-11-16

## 2020-11-16 NOTE — TELEPHONE ENCOUNTER
[facility] Call Center    Phone Message    May a detailed message be left on voicemail: yes     Reason for Call: Other: Patient Request: Patient reports that she is supposed to start taking antibiotic, Augmentin 875 mg, for a dog bite and would like to know if that will effect her procedure tomorrow, 11/17/2020. Unable to connect to Pain clinic back line. Please call patient back to advise.     Action Taken: Message routed to:  [facility] ([facility] PAIN    Travel Screening: Not Applicable

## 2020-11-17 ENCOUNTER — ANCILLARY PROCEDURE (OUTPATIENT)
Dept: RADIOLOGY | Facility: AMBULATORY SURGERY CENTER | Age: 50
End: 2020-11-17
Attending: ANESTHESIOLOGY
Payer: COMMERCIAL

## 2020-11-17 ENCOUNTER — HOSPITAL ENCOUNTER (OUTPATIENT)
Facility: AMBULATORY SURGERY CENTER | Age: 50
Discharge: HOME | End: 2020-11-17
Attending: ANESTHESIOLOGY | Admitting: ANESTHESIOLOGY
Payer: COMMERCIAL

## 2020-11-17 VITALS
HEART RATE: 69 BPM | TEMPERATURE: 98.5 F | OXYGEN SATURATION: 95 % | DIASTOLIC BLOOD PRESSURE: 51 MMHG | RESPIRATION RATE: 14 BRPM | WEIGHT: 248 LBS | SYSTOLIC BLOOD PRESSURE: 102 MMHG | HEIGHT: 64 IN | BODY MASS INDEX: 42.34 KG/M2

## 2020-11-17 DIAGNOSIS — R52 PAIN: ICD-10-CM

## 2020-11-17 DIAGNOSIS — M47.816 LUMBAR SPONDYLOSIS: Primary | ICD-10-CM

## 2020-11-17 DIAGNOSIS — M47.816 LUMBAR SPONDYLOSIS: ICD-10-CM

## 2020-11-17 PROCEDURE — 64635 DESTROY LUMB/SAC FACET JNT: CPT | Mod: RT

## 2020-11-17 RX ORDER — LIDOCAINE HYDROCHLORIDE 20 MG/ML
INJECTION, SOLUTION INFILTRATION; PERINEURAL PRN
Status: DISCONTINUED | OUTPATIENT
Start: 2020-11-17 | End: 2020-11-17 | Stop reason: HOSPADM

## 2020-11-17 RX ORDER — LIDOCAINE HYDROCHLORIDE 10 MG/ML
INJECTION, SOLUTION EPIDURAL; INFILTRATION; INTRACAUDAL; PERINEURAL PRN
Status: DISCONTINUED | OUTPATIENT
Start: 2020-11-17 | End: 2020-11-17 | Stop reason: HOSPADM

## 2020-11-17 RX ORDER — LIDOCAINE 40 MG/G
CREAM TOPICAL
Status: DISCONTINUED | OUTPATIENT
Start: 2020-11-17 | End: 2020-11-18 | Stop reason: HOSPADM

## 2020-11-17 RX ORDER — MIDAZOLAM HYDROCHLORIDE 1 MG/ML
INJECTION INTRAMUSCULAR; INTRAVENOUS PRN
Status: DISCONTINUED | OUTPATIENT
Start: 2020-11-17 | End: 2020-11-17 | Stop reason: HOSPADM

## 2020-11-17 RX ORDER — FENTANYL CITRATE 50 UG/ML
INJECTION, SOLUTION INTRAMUSCULAR; INTRAVENOUS PRN
Status: DISCONTINUED | OUTPATIENT
Start: 2020-11-17 | End: 2020-11-17 | Stop reason: HOSPADM

## 2020-11-17 RX ORDER — LIDOCAINE 40 MG/G
CREAM TOPICAL
Status: CANCELLED | OUTPATIENT
Start: 2020-11-17

## 2020-11-17 RX ORDER — BUPIVACAINE HYDROCHLORIDE 2.5 MG/ML
INJECTION, SOLUTION INFILTRATION; PERINEURAL PRN
Status: DISCONTINUED | OUTPATIENT
Start: 2020-11-17 | End: 2020-11-17 | Stop reason: HOSPADM

## 2020-11-17 ASSESSMENT — MIFFLIN-ST. JEOR: SCORE: 1726.98

## 2020-11-17 NOTE — TELEPHONE ENCOUNTER
Pt has already arrived for procedure and will discuss with provider.     Mariama Montoya, BSN, RN

## 2020-11-17 NOTE — DISCHARGE INSTRUCTIONS
PAIN INJECTION HOME CARE INSTRUCTIONS  Activity  -You may resume most normal activity levels with the exception of strenuous activity. It may help to move in ways that hurt before the injection, to see if the pain is still there, but do not overdo it. Take it easy for the rest of the day.    -DO NOT remove bandaid for 24 hours  -DO NOT shower for 24 hours      Pain  -You may feel immediate pain relief and numbness for a period of time after the injection. This may indicate the medication has reached the right spot.  -Your pain may return after this short pain-free period, or may even be a little worse for a day or two. It may be caused by needle irritation or by the medication itself. The medications usually take two or three days to start working, but can take as long as a week.    -You may use an ice pack for 20 minutes every 2 hours for the first 24 hours  -You may use a heating pad after the first 24 hours  -You may use Tylenol (acetaminophen) every 4 hours or other pain medicines as directed by your physician      DID YOU RECEIVE SEDATION TODAY?  Yes    If you received sedation please follow these additional safety measures.    Sedation medicine, if given, may remain active for many hours. It is important for the next 24 hours that you do not:  -Drive a car  -Operate machines or power tools  -Consume alcohol, including beer  -Sign any important papers or legal documents    DID YOU RECEIVE STEROIDS TODAY?  Yes    Common side effects of steroids:  Not everyone will experience corticosteroid side effects. If side effects are experienced, they will gradually subside in the 7-10 day period following an injection. Most common side effects include:  -Flushed face and/or chest  -Feeling of warmth, particularly in the face but could be an overall feeling of warmth  -Increased blood sugar in diabetic patients  -Menstrual irregularities my occur. If taking hormone-based birth control an alternate method of birth control  is recommended  -Sleep disturbances and/or mood swings are possible  -Leg cramps    PLEASE KEEP TRACK OF YOUR SYMPTOMS AND NOTE ANY CHANGES FOR YOUR DOCTOR.       Please contact us if you have:  -Severe pain  -Fever more than 101.5 degrees Fahrenheit  -Signs of infection at the injection site (redness, swelling, or drainage)    If you have questions, please contact the Pain Clinic at [phone] Option #1 between the hours of 7:00 am and 3:00 pm Monday through Friday. After office hours you can contact the on call provider by dialing [phone]. If you need immediate attention, we recommend that you go to a hospital emergency room or dial [phone].    For patients seen by the PM and R service, please call [phone].    If you have procedure scheduling questions please call [phone] Option #2

## 2020-11-17 NOTE — H&P
Zayra Ramirez  [id number]  female  50 year old      Reason for procedure/surgery: low back pain    Patient Active Problem List   Diagnosis     Chronic midline low back pain     Facial cellulitis     Morbid (severe) obesity due to excess calories (H)     Dental abscess     Hypoxia     Subcutaneous emphysema (H)     Borderline personality disorder (H)     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)     Esophageal stricture     GERD (gastroesophageal reflux disease)     HTN (hypertension)     Interstitial lung disease (H)     Moderate persistent asthma without complication     Onychomycosis     Restless leg syndrome     Seasonal allergies     Smoker     Stress     Respiratory bronchiolitis interstitial lung disease (H)     Spinal epidural abscess     Lumbar spondylosis     Inflammatory arthritis     Arthralgia of temporomandibular joint     Articular disc disorder of temporomandibular joint     Derangement of temporomandibular joint     Calculus of gallbladder without cholecystitis without obstruction     Displacement of articular disc of temporomandibular joint with reduction     Myofascial pain     Oral lesion     Symptomatic cholelithiasis     Sacro-iliac pain     Degenerative lumbar spinal stenosis       Past Surgical History:    Past Surgical History:   Procedure Laterality Date     CHOLECYSTECTOMY       COLONOSCOPY       COLONOSCOPY N/A 2/6/2020    Procedure: COLONOSCOPY, WITH POLYPECTOMY AND BIOPSY;  Surgeon: Julian Mccullough MD;  Location:  GI     ENT SURGERY       ESOPHAGOSCOPY, GASTROSCOPY, DUODENOSCOPY (EGD), COMBINED N/A 2/6/2020    Procedure: ESOPHAGOGASTRODUODENOSCOPY (EGD);  Surgeon: Julian Mccullough MD;  Location:  GI     EXCISE LESION INTRAORAL Bilateral 10/3/2018    Procedure: EXCISE LESION INTRAORAL;  Wide Local Excision Of of Left Oral Cavity Ulcer;  Surgeon: Morro Mijares MD;  Location:  OR      DRAIN SKIN ABSCESS SIMPLE/SINGLE  3/16/2012    Procedure:INCISION AND  DRAINAGE, ABSCESS, SIMPLE; Surgeon:CHRISTIANO HANCOCK; Location: GI     HEAD & NECK SURGERY       HYSTERECTOMY       INCISION AND DRAINAGE ABDOMEN WASHOUT, COMBINED       INJECT EPIDURAL LUMBAR Right 9/15/2020    Procedure: Lumbar5- sacral 1 epidural steroid injection with fluoroscopy;  Surgeon: Tram Florian MD;  [facility]     INJECT SACROILIAC JOINT Bilateral 6/16/2020    Procedure: Bilateral sacroiliac joint steroid injection with fluoroscopy;  Surgeon: Tram Florian MD;  [facility]     LAMINECTOMY THORACIC ONE LEVEL N/A 8/19/2019    Procedure: LAMINECTOMY, SPINE, THORACIC, 11-12 and Part of Lumbar 1, DRAINAGE OF EPIDURAL ABCESS, Epidural Drain Placement X 2;  Surgeon: Yadiel Beal MD;  Location: [facility] OR     RADIO FREQUENCY ABLATION / DESTRUCTION OF SACROILOAC JOINT DORSAL PRIMARY RAMUS Bilateral 12/17/2019    Procedure: Bilateral lumbar radiofrequency ablation with fluoroscopy and intravenous sedation ( Lumbar 2,3,4,5 medial branch nerves for the bilateral lumbar3-4, 4-5 and 5-sacral1 joints.;  Surgeon: Tram Florian MD;  [facility]     spinal cord stimulator  08/08/2019     spinal cord stimulator removal  08/13/2019       Past Medical History:   Past Medical History:   Diagnosis Date     Allergic rhinitis      Anemia      Arthritis      Asthma     copd     Dental abscess 8-2015     Depressive disorder      Gastroesophageal reflux disease      History of emphysema      Hoarseness      Hypertension      Morbid obesity with BMI of 40.0-44.9, adult (H)      Obstructive sleep apnea      Other chronic pain      Respiratory bronchiolitis interstitial lung disease (H)      Sleep apnea        Social History:   Social History     Tobacco Use     Smoking status: Current Every Day Smoker     Packs/day: 1.00     Years: 30.00     Pack years: 30.00     Types: Cigarettes     Start date: 1/1/1996     Smokeless tobacco: Never Used     Tobacco comment: has tried the patch   Substance Use Topics      Alcohol use: No     Binge frequency: Monthly       Family History:   Family History   Problem Relation Age of Onset     Other Cancer Father         base of tongue cancer at age ~65     Hypertension Father      Back Pain Father      Restless Leg Syndrome Father      Asthma Mother      Restless Leg Syndrome Mother      Restless Leg Syndrome Sister        Allergies:   Allergies   Allergen Reactions     Bee Venom Anaphylaxis     Bees      Doxycycline Anaphylaxis     Patient thinks it may have been just nausea and vomiting, however unable to confirm     Erythromycin Anaphylaxis and Shortness Of Breath     Other reaction(s): Vomiting     Hydrocodone-Acetaminophen Itching       Active Medications:   Current Outpatient Medications   Medication Sig Dispense Refill     albuterol (PROAIR HFA, PROVENTIL HFA, VENTOLIN HFA) 108 (90 BASE) MCG/ACT inhaler Inhale 2 puffs into the lungs every 6 hours as needed for shortness of breath / dyspnea or wheezing (PT last dose 1.23.2020)        ARIPiprazole (ABILIFY) 15 MG tablet Take 15 mg by mouth At Bedtime        busPIRone HCl (BUSPAR) 30 MG tablet Take 15 mg by mouth At Bedtime       cholecalciferol ( ULTRA STRENGTH) 2000 units CAPS TAKE ONE CAPSULE BY MOUTH DAILY IN AM       cyclobenzaprine (FLEXERIL) 10 MG tablet Take 1 tablet (10 mg) by mouth 3 times daily (Patient taking differently: Take 10 mg by mouth At Bedtime ) 30 tablet 0     DULoxetine (CYMBALTA) 60 MG capsule Take 120 mg by mouth At Bedtime        ferrous sulfate (FEROSUL) 325 (65 Fe) MG tablet Take 325 mg by mouth daily (with breakfast)   0     fluticasone-salmeterol (ADVAIR) 500-50 MCG/DOSE diskus inhaler Inhale 1 puff into the lungs 2 times daily        folic acid (FOLVITE) 1 MG tablet Take 1 tablet (1 mg) by mouth daily 90 tablet 3     lisinopril-hydrochlorothiazide (PRINZIDE/ZESTORETIC) 20-25 MG per tablet Take 1 tablet by mouth daily (Patient taking differently: Take 1 tablet by mouth every evening ) 30 tablet  "0     montelukast (SINGULAIR) 10 MG tablet Take 10 mg by mouth At Bedtime       nystatin (MYCOSTATIN) 411336 UNIT/GM external cream Apply topically 2 times daily 30 g 3     omeprazole (PRILOSEC) 40 MG DR capsule Take 40 mg by mouth as needed (PT last dose appr 2 weeks ago)       pregabalin (LYRICA) 50 MG capsule Take 50 mg by mouth at midday and evening. May increase each dose by 50 mg every 5 days as needed up to 150 mg twice daily. 150 capsule 0     rOPINIRole (REQUIP) 0.5 MG tablet Take 1.5 mg (one 0.5 mg tab with 1/2 of a 2 mg tab) by mouth twice daily. Reduce each dose by 0.5 mg every 5 days until off of ropinirole 20 tablet 0     traMADol (ULTRAM) 50 MG tablet Take 1-2 tablets ( mg) by mouth every 8 hours as needed for moderate to severe pain 180 tablet 0     vitamin C 500 MG TABS Take 500 mg by mouth daily (with lunch)        zinc sulfate (ZINCATE) 220 (50 Zn) MG capsule Take 220 mg by mouth daily (with lunch)        EPINEPHrine (EPIPEN/ADRENACLICK/OR ANY BX GENERIC EQUIV) 0.3 MG/0.3ML injection 2-pack INJECT 0.3ML INTO THE MUSCLE ONCE AS NEEDED FOR ANAPHYLAXIS       methotrexate 2.5 MG tablet TAKE 9 TABLETS BY MOUTH ONCE WEEKLY 108 tablet 3     OXYGEN-HELIUM IN 2-3L PRN during the day, 3L @ night       rOPINIRole (REQUIP) 2 MG tablet Take 2 tablets (4 mg) by mouth nightly as needed (for restless leg syndrome) 60 tablet 0       Systemic Review:   CONSTITUTIONAL: NEGATIVE for fever, chills, change in weight  ENT/MOUTH: NEGATIVE for ear, mouth and throat problems  RESP: NEGATIVE for significant cough or SOB  CV: NEGATIVE for chest pain, palpitations or peripheral edema    Physical Examination:   Vital Signs: /72   Pulse 69   Temp 97.8  F (36.6  C) (Temporal)   Resp 15   Ht 1.613 m (5' 3.5\")   Wt 112.5 kg (248 lb)   LMP  (LMP Unknown)   SpO2 98%   BMI 43.24 kg/m    GENERAL: healthy, alert and no distress  NECK: no adenopathy, no asymmetry, masses, or scars  RESP: lungs clear to auscultation - " no rales, rhonchi or wheezes  CV: regular rate and rhythm, normal S1 S2, no S3 or S4, no murmur, click or rub, no peripheral edema and peripheral pulses strong  ABDOMEN: soft, nontender, no hepatosplenomegaly, no masses and bowel sounds normal  MS: no gross musculoskeletal defects noted, no edema    Plan: Appropriate to proceed as scheduled.      Tram Florian MD  11/17/2020

## 2020-11-17 NOTE — PROCEDURES
Lumbar Medial Branch Radiofrequency     PROCEDURE NOTE:     Chief Complaint:  Back pain     Pre-Operative Diagnosis:  1. Spondylosis w/o myelopathy or radiculopathy, lumbar region  2. Spondylosis w/o myelopathy or radiculopathy, lumbosacral region  3. Other intervertebral disc degeneration, lumbar region  4. Low back pain     Post-Operative Diagnosis:  1. Spondylosis w/o myelopathy or radiculopathy, lumbar region  2. Spondylosis w/o myelopathy or radiculopathy, lumbosacral region  3. Other intervertebral disc degeneration, lumbar region  4. Low back pain     Procedure:  Right lumbar medial branch nerve radiofrequency ablation bilateral L2,3,4,5 nerves supplying the right L3-4, L4-5 and L5-S1 facet joints  Fluoroscopy for needle guidance  Intravenous conscious sedation      Anesthesia:  The patient was anxious prior to the procedure and requested conscious sedation. ?She was monitored with pulse oximetry, automatic blood pressure cuff and ECG. She received moderate sedation and was attended by a registered nurse for one-to-one monitoring throughout the procedure. The following medication(s) were administered for anxiety and analgesia: 2 mg midazolam and 100 mcg fentanyl over 15 minutes. Medication dosages and vital signs are recorded in the estela-operative note.     Indications / Pre-Operative Plan:  The patient has persistent axial, non radicular pain with paraspinal tenderness, and has failed a variety of conservative therapeutic interventions including activity modification, physical therapy, and medications. The patient has had diagnostic response with greater than 80% relief and improvement in function for the duration of the two comparative anesthetic medial branch blocks and presents for therapeutic Radiofrequency medial branch neurolysis. The risks, alternatives and benefits were explained to the patient in detail. The patient agreed with the plan.   Patient was examined by me prior to the procedure.  Examination of heart, lung and mental status were all within acceptable limits. The patient has been assessed, examined and cleared for the planned procedure and level of anesthesia in an ambulatory surgery center.     Description of Procedure:  After informed consent, the patient was brought to the procedure room and placed onto the x-ray table in the prone position. The posterior lumbar region was prepped and draped in the usual sterile fashion. I used AP fluoroscopy to visualize the lumbar spine and identify the lateral facet columns. I then proceeded to ablate the lumbar medial branches at the above noted levels using the following technique:  I rotated the fluoroscope oblique until I was able to visualize the groove between the superior articular and transverse processes of the specific spinal level. I then raised a skin wheal overlying this target associated with the specific medial branch nerve and then passed a 10mm, 10cm, 18 gauge, curved tip needle through the skin wheal and down to the periosteum of the target site. I then navigated the needle tip into optimal position for medial branch neurotomy using AP and lateral fluoroscopy. Once the needle was in the region of the medial branch nerve, I began the stimulation phase of the procedure.  I stimulated each needle in sequential fashion with 50 HZ current to 1 volt and then with 2 HZ current to 2 volts. I then repositioned the needles until an optimal stimulation pattern was obtained. Once the stimulation had been completed and the needles optimally positioned, I then saved images to disc on both AP and lateral views. I then blocked the medial branch nerve by injecting the needle with 0.5mL lidocaine 2%. I created a radiofrequency lesion for 90 seconds duration at 90 degrees centigrade. Bupivacaine 0.25% was injected in divided doses onto the medial branch nerves ablated. The fluoroscopy time for this procedure is recorded in nursing notes. A similar  procedure was carried out for each of the medial branch neurotomies. All needles were placed easily without trauma.     COMPLICATIONS:  Upon completion of the procedure, the patient was moved to the recovery room for observation. No immediate complications occurred.     PROCEDURE IMAGES: saved to the chart     Follow up in clinic as needed

## 2020-11-19 DIAGNOSIS — G25.81 RESTLESS LEG SYNDROME: ICD-10-CM

## 2020-11-19 NOTE — TELEPHONE ENCOUNTER
rOPINIRole (REQUIP) 0.5 MG tablet      Last Written Prescription Date:  9/28/2020  Last Fill Quantity: 20,   # refills: 0  Last Office Visit: 9/28/2020  Future Office visit:  1 month follow up- Not scheduled      Routing refill request to provider for review/approval because:  Drug not on the [facility], [facility] or [facility] refill protocol or controlled substance      She is waking with restless legs. She lays down around 7 PM. She had 1 mg ropinirole left so she took that and it helped.  She is on 150 mg pregabalin in the afternoon and 150 mg at bedtime. She feels she is doing better in the daytime, but still wakes from symptoms in the night.  She said she got manic when she tried 200 mg twice daily.   I refilled the ropinirole for 1 mg at bedtime. Try that for a few weeks and, if doing well, try alternating 1 mg and 0.5 mg every other night. We may need to wean off more slowly.    She cut her caffeine back to about 3 servings per day. She still has some after 3 PM. She was advised to keep it before 3 PM.    She had an ablation procedure for nerves in her back and is starting to be up and about more. She was advised to get out for a walk in the afternoon and do some stretching before bedtime.    Try a magnesium supplement to see if that helps.    I gave her the clinic phone number to call to make a follow up in about a month.    Bennett Goltz, PA-C

## 2020-11-23 RX ORDER — ROPINIROLE 0.5 MG/1
TABLET, FILM COATED ORAL
Qty: 60 TABLET | Refills: 0 | Status: SHIPPED | OUTPATIENT
Start: 2020-11-23 | End: 2020-12-16

## 2020-12-16 DIAGNOSIS — G25.81 RESTLESS LEG SYNDROME: ICD-10-CM

## 2020-12-16 NOTE — TELEPHONE ENCOUNTER
rx pending ropinirole HCL 0.5 mg tablets, DISP:  60 tabs, SIG:  Take 2 tablets by mouth at Bedtime, R^0        Last Written Prescription Date:  11/23/2020  Last Fill Quantity: 60,   # refills: 0  Last Office Visit: 9/28/2020  Future Office visit:      Routed to Bennett Goltz, PA-C , for review     Routing refill request to provider for review/approval because:  Drug not on the [facility], [facility] or [facility] refill protocol or controlled substance    Ropinirole refilled. Follow up is scheduled on 1/5.  Bennett Ezra Goltz, PA-C

## 2020-12-18 RX ORDER — ROPINIROLE 0.5 MG/1
TABLET, FILM COATED ORAL
Qty: 60 TABLET | Refills: 0 | Status: SHIPPED | OUTPATIENT
Start: 2020-12-18 | End: 2021-02-15

## 2020-12-28 DIAGNOSIS — G25.81 RESTLESS LEG SYNDROME: ICD-10-CM

## 2020-12-28 RX ORDER — PREGABALIN 50 MG/1
CAPSULE ORAL
Qty: 150 CAPSULE | Refills: 0 | Status: CANCELLED | OUTPATIENT
Start: 2020-12-28

## 2020-12-28 NOTE — TELEPHONE ENCOUNTER
Refill request for 90 day supply of Pregabalin (lyrica) 50 MG capsule    Last office visit: 9/28/20      Phoebe GRACIA CMA [facility]

## 2020-12-29 DIAGNOSIS — G25.81 RESTLESS LEG SYNDROME: ICD-10-CM

## 2020-12-29 RX ORDER — PREGABALIN 50 MG/1
CAPSULE ORAL
Qty: 150 CAPSULE | Refills: 0 | Status: CANCELLED | OUTPATIENT
Start: 2020-12-29

## 2020-12-29 RX ORDER — PREGABALIN 150 MG/1
150 CAPSULE ORAL 2 TIMES DAILY
Qty: 60 CAPSULE | Refills: 3 | Status: SHIPPED | OUTPATIENT
Start: 2020-12-29 | End: 2021-05-18

## 2020-12-29 NOTE — TELEPHONE ENCOUNTER
I ordered the 150 mg capsules, 2 per day. Discontinued order for 50 mg caps. This was a duplicate request.  Bennett Goltz, PA-C     Reason for call:  Other   Patient called regarding (reason for call):   call back    Additional comments:   OUT OF MEDICATION, waiting for signing orders please    Phone number to reach patient:  Cell number on file:    Telephone Information:   Mobile [phone]       Best Time:  any    Can we leave a detailed message on this number?  YES    Travel screening: Not Applicable

## 2020-12-30 NOTE — TELEPHONE ENCOUNTER
Zayra has been on 150 mg in the afternoon and 150 mg in the evening. This has been working better lately. She is down to 0.5 mg ropinirole and her symptoms have been well controlled.  I refilled the medication 150 mg twice daily with (#60) with 3 refills.  Bennett Goltz, PA-C

## 2021-01-05 ENCOUNTER — VIRTUAL VISIT (OUTPATIENT)
Dept: SLEEP MEDICINE | Facility: CLINIC | Age: 51
End: 2021-01-05
Payer: COMMERCIAL

## 2021-01-05 VITALS — HEIGHT: 64 IN | WEIGHT: 220 LBS | BODY MASS INDEX: 37.56 KG/M2

## 2021-01-05 DIAGNOSIS — G25.81 RESTLESS LEG SYNDROME: Primary | ICD-10-CM

## 2021-01-05 PROCEDURE — 99214 OFFICE O/P EST MOD 30 MIN: CPT | Mod: 95 | Performed by: PHYSICIAN ASSISTANT

## 2021-01-05 ASSESSMENT — MIFFLIN-ST. JEOR: SCORE: 1599.97

## 2021-01-05 NOTE — PROGRESS NOTES
Zayra Ramirez is a 50 year old female who is being evaluated via a billable video visit.      How would you like to obtain your AVS? MyChart  If the video visit is dropped, the invitation should be resent by: Text to cell phone: [phone]  Will anyone else be joining your video visit? No      Video Start Time: 10:06 AM  Assessment & Plan     Restless leg syndrome  Comment: She feels her restlessness is well-controlled on 150 mg pregabalin in the afternoon and at bedtime and 0.5 mg ropinirole at bedtime. She has also been taking magnesium at bedtime, but is not sure how much of a difference that is making. She is still sleeping up to 12 hours some nights. This is reduced from in the past. She is waking less in the night and less sleepy in the daytime. Her long sleep requirement could be related to a number of sedating medications at bedtime.  Plan: Continue the 150 mg pregabalin in the afternoon and at bedtime. I advised her to go without the magnesium to see if it is making any difference in her symptoms. Once evaluating whether or not that makes a difference, then try cutting the cyclobenzaprine in half to see if that is contributing to long sleep duration. If that is tolerated, try cutting the ropinirole in half.         32 minutes spent on the date of the encounter doing chart review, history and exam, documentation and further activities as noted above         Tobacco Cessation:   reports that she has been smoking cigarettes. She started smoking about 25 years ago. She has a 30.00 pack-year smoking history. She has never used smokeless tobacco.          Return in about 1 year (around 1/5/2022), or sooner if symptoms worsen or fail to improve, for RLS Recheck, Medication check-up.    Bennett Ezra Goltz, PA-C [facility]    Subjective     Zayra Ramirez is a 50 year old female who presents to clinic today for medication management of restless legs syndrome.    GRISELDA PADRON  James is a 50 year old female who presents to clinic today for follow up of medication for restless legs syndrome.  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 At her last visit, she was also advised to take her daily iron with Vit C, reduce caffeine to 3 servings per day or less, and have her ferritin checked (which was 149 in September). She tried 200 mg pregabalin twice daily but got manic. Her daytime symptoms were better on pregabalin bid, but she was still symptomatic at night. She resumed 1 mg ropinirole at bedtime for those symptoms. She has cut down to 0.5 mg ropinirole. She has added magnesium at night. She is not sure if that is helping because a lot of things happened at once. Her control of RLS is much better. She has not been bothered in about a week.     She feels she is sleeping well, but she sleeps about 12 hours (sometimes she sleeps less and feels fine). She goes to bed 8-10 PM and gets up between 7-10 AM. She was sleeping even more a month or two ago. She does not feel the afternoon dose of pregabalin makes her sleepy. She takes 15 mg aripiprazole and 10 mg cyclobenzaprine at bedtime as well. She has been sleeping pretty well with an occasional awakening for the restroom. She gets right back to sleep. She denies napping or inadvertent dozing in the daytime.     Regarding ROBERT: She is on CPAP 14 cm with 3 lpm O2. She follows with Dr. Bonilla at [facility] yearly for her sleep disordered breathing. She was seen by them in November.        Objective           Vitals:  No vitals were obtained today due to virtual  visit.        Orders Only on 11/14/2020   Component Date Value Ref Range Status     COVID-19 Virus PCR to [facility] - So* 11/14/2020 Nasopharyngeal   Final     COVID-19 Virus PCR to [facility] - Re* 11/14/2020 Not Detected   Final    Comment: Collection of multiple specimens from the same patient may be necessary to   detect the virus. The possibility of a false negative should be considered if   the patient's recent exposure or clinical presentation suggests 2019 nCOV   infection and diagnostic tests for other causes of illness are negative.   Repeat testing may be considered in this setting.  Patient sample was heat inactivated and amplified using the HDPCR SARS-CoV-2   assay ([employer] The HDPCRTM SARS-CoV-2 assay is a reverse   transcription real-time polymerase chain reaction (qRT-PCR) test intended for   the qualitative detection of nucleic acid  from SARS-CoV-2 in human nasopharyngeal swabs, oropharyngeal swabs, anterior   nasal swabs, mid-turbinate nasal swabs as well as nasal aspirate, nasal wash,   and bronchoalveolar lavage (BAL) specimens from individuals who are suspected   of COVID-19 by their healthcare provider.  A negative result does not rule out the presence of real-time PCR inhibitors   in the sp                           ecimen or COVID-19 RNA in concentrations below the limit of detection   of the assay. The possibility of a false negative should be considered if the   patients recent exposure or clinical presentation suggests COVID-19.   Additional testing or repeat testing requires consultation with the   laboratory.  Nasopharyngeal specimen is the preferred choice for swab-based SARS CoV2   testing. When collection of a nasopharyngeal swab is not possible the   following are acceptable alternatives:  an oropharyngeal (OP) specimen collected by a healthcare professional, or a   nasal mid-turbinate (NMT) swab collected by a healthcare professional or by   onsite self-collection (using a  flocked tapered swab), or an anterior nares   specimen collected by a healthcare professional or by onsite self-collection   (using a round foam swab). ([employer])  Testing performed by [facility] ([facility]) [address] [place] [address]  The test performance characteristics were determined by [facility]. It has not been   cleared or approved by the FDA.  The laboratory is regulated under the Clinical Laboratory Improvement   Amendments of 1988 (CLIA-88) as qualified to perform high-complexity testing.   This test is used for clinical purposes. It should not be regarded as   investigational or for research.                   Video-Visit Details    Type of service:  Video Visit    Video End Time:10:21 AM    Originating Location (pt. Location): Home    Distant Location (provider location):  [facility]     Platform used for Video Visit: [employer]

## 2021-01-05 NOTE — PATIENT INSTRUCTIONS
Continue the 150 mg pregabalin in the afternoon and at bedtime.Try to go without the magnesium to see if it is making any difference in your leg symptoms. If it seems to help, resume taking it. If not, continue to go without it. Then try cutting the cyclobenzaprine in half to see if that helps reduce your sleep duration without too much exacerbation of pain. If that is tolerated and helpful, you could try going off of it. If you find you really benefit from the cyclobenzaprine, you may resume taking it at the lowest effective dose. Lastly, try cutting the ropinirole in half for a month. If that is tolerated, try discontinuing the ropinirole.    Return as needed if you have any exacerbation of your restless legs prior to the year follow up.  Your BMI is Body mass index is 38.36 kg/m .  Weight management is a personal decision.  If you are interested in exploring weight loss strategies, the following discussion covers the approaches that may be successful. Body mass index (BMI) is one way to tell whether you are at a healthy weight, overweight, or obese. It measures your weight in relation to your height.  A BMI of 18.5 to 24.9 is in the healthy range. A person with a BMI of 25 to 29.9 is considered overweight, and someone with a BMI of 30 or greater is considered obese. More than two-thirds of American adults are considered overweight or obese.  Being overweight or obese increases the risk for further weight gain. Excess weight may lead to heart disease and diabetes.  Creating and following plans for healthy eating and physical activity may help you improve your health.  Weight control is part of healthy lifestyle and includes exercise, emotional health, and healthy eating habits. Careful eating habits lifelong are the mainstay of weight control. Though there are significant health benefits from weight loss, long-term weight loss with diet alone may be very difficult to achieve- studies show long-term success with  dietary management in less than 10% of people. Attaining a healthy weight may be especially difficult to achieve in those with severe obesity. In some cases, medications, devices and surgical management might be considered.  What can you do?  If you are overweight or obese and are interested in methods for weight loss, you should discuss this with your provider.     Consider reducing daily calorie intake by 500 calories.     Keep a food journal.     Avoiding skipping meals, consider cutting portions instead.    Diet combined with exercise helps maintain muscle while optimizing fat loss. Strength training is particularly important for building and maintaining muscle mass. Exercise helps reduce stress, increase energy, and improves fitness. Increasing exercise without diet control, however, may not burn enough calories to loose weight.       Start walking three days a week 10-20 minutes at a time    Work towards walking thirty minutes five days a week     Eventually, increase the speed of your walking for 1-2 minutes at time    In addition, we recommend that you review healthy lifestyles and methods for weight loss available through the National Institutes of Health patient information sites:  http://win.niddk.nih.gov/publications/index.htm    And look into health and wellness programs that may be available through your health insurance provider, employer, local community center, or [employer].    Weight management plan: Patient was referred to their PCP to discuss a diet and exercise plan.

## 2021-01-11 DIAGNOSIS — G25.81 RESTLESS LEG SYNDROME: ICD-10-CM

## 2021-01-11 RX ORDER — ROPINIROLE 0.5 MG/1
TABLET, FILM COATED ORAL
Qty: 60 TABLET | Refills: 0 | OUTPATIENT
Start: 2021-01-11

## 2021-01-14 ENCOUNTER — VIRTUAL VISIT (OUTPATIENT)
Dept: INTERNAL MEDICINE | Facility: CLINIC | Age: 51
End: 2021-01-14
Payer: COMMERCIAL

## 2021-01-14 DIAGNOSIS — R79.89 ELEVATED SERUM CREATININE: Primary | ICD-10-CM

## 2021-01-14 DIAGNOSIS — I10 ESSENTIAL HYPERTENSION: ICD-10-CM

## 2021-01-14 PROCEDURE — 99214 OFFICE O/P EST MOD 30 MIN: CPT | Mod: 95 | Performed by: INTERNAL MEDICINE

## 2021-01-14 RX ORDER — LISINOPRIL AND HYDROCHLOROTHIAZIDE 20; 25 MG/1; MG/1
1 TABLET ORAL DAILY
Qty: 90 TABLET | Refills: 1 | Status: SHIPPED | OUTPATIENT
Start: 2021-01-14 | End: 2021-07-20

## 2021-01-14 NOTE — PROGRESS NOTES
"This patient is being evaluated via a billable telephone visit; THIS VISIT WAS INITIATED BY THE PT, as AN ALTERNATIVE TO IN PERSON VISIT .       The patient has has been notified of following:      \"This billable telephone visit will be conducted via a call between you and your physician/provider. We have found that certain health care needs can be provided without the need for a physical exam.  This service lets us provide the care you need with a short phone conversation.  If a prescription is necessary we can send it directly to your pharmacy.  If lab work is needed we can place an order for that and you can then stop by our lab to have the test done at a later time. We can also place orders for a limited number of imaging tests if they are deemed urgently necessary.     If during the course of the call the physician/provider feels a telephone visit is not appropriate, you will not be charged for this service.\"     Due to efforts to reduce the spread of COVID-19 in the clinic, state, nation, telephone visits are encouraged currently. Patient understands that diagnose and advice is limited by the inability to exam him/her/them face-to-face.    Patient has given verbal consent for the virtual audio visit? Yes  Did patient initiate this virtual visit? Yes    Person spoken to: patient    This was a synchronous virtual visit  Time call initiated:  1:33  Time call ended:  1:55  Total length of call: 22 min    Francoise Chew M.D.  [facility]       Patients: if you have questions or concerns about this progress note, please discuss them with the provider at a future office visit.      "

## 2021-01-14 NOTE — NURSING NOTE
Chief Complaint   Patient presents with     Recheck Medication     pt would like to discuss meds, lisinopril       Audie Vergara CMA, EMT at 12:44 PM on 1/14/2021.

## 2021-01-14 NOTE — PATIENT INSTRUCTIONS
Patient Education     Eating Heart-Healthy Food: Using the DASH Plan    Eating for your heart doesn t have to be hard or boring. You just need to know how to make healthier choices. The DASH eating plan has been developed to help you do just that. DASH stands for Dietary Approaches to Stop Hypertension. It is a plan that has been proven to be healthier for your heart and to lower your risk for high blood pressure. It can also help lower your risk for cancer, heart disease, osteoporosis, and diabetes.  Choosing from each food group  Choose foods from each of the food groups below each day. Try to get the recommended number of servings for each food group. The serving numbers are based on a diet of 2,000 calories a day. Talk with your healthcare provider if you re not sure about your calorie needs. Along with getting the correct servings, the DASH plan also advises less than 2,300 mg of salt (sodium) per day. Lowering sodium intake to 1,500 mg per day lowers blood pressure even more. (There's about 2,300 mg of sodium in 1 teaspoon of salt.)      Grains  Servings: 6 to 8 a day  A serving is:    1 slice bread    1 ounce dry cereal    Half a cup cooked rice, pasta or cereal  Best choices: Whole grains and any grains high in fiber. Vegetables  Servings: 4 to 5 a day  A serving is:    1 cup raw leafy vegetable    Half a cup cut-up raw or cooked vegetable    Half a cup vegetable juice  Best choices: Fresh or frozen vegetables prepared without added salt or fat.   Fruits  Servings: 4 to 5 a day  A serving is:    1 medium fruit    One-quarter cup dried fruit    Half a cup fresh, frozen, or canned fruit    Half a cup of 100% fruit juices  Best choices: A variety of fresh fruits of different colors. Whole fruits are a better choice than fruit juices. Low-fat or fat-free dairy  Servings: 2 to 3 a day  A serving is:    1 cup milk    1 cup yogurt    One and a half ounces cheese  Best choices: Skim or 1% milk, low-fat or fat-free  yogurt or buttermilk, and low-fat cheeses.         Lean meats, poultry, fish  Servings: 6 or fewer a day  A serving is:    1 ounce cooked meats, poultry, or fish    1 egg  Best choices: Lean poultry and fish. Trim away visible fat. Broil, grill, roast, or boil instead of frying. Remove skin from poultry before eating. Limit how much red meat you eat.  Nuts, seeds, beans  Servings: 4 to 5 a week  A serving is:    One-third cup nuts (one and a half ounces)    2 tablespoons nut butter or seeds    Half a cup cooked dry beans or legumes  Best choices: Dry roasted nuts with no salt added, lentils, kidney beans, garbanzo beans, and whole mclaughlin beans.   Fats and oils  Servings: 2 to 3 a day  A serving is:    1 teaspoon vegetable oil    1 teaspoon soft margarine    1 tablespoon mayonnaise    2 tablespoons salad dressing  Best choices: Nut and vegetable oils (nontropical vegetable oils), such as olive and canola oil. Sweets  Servings: 5 a week or fewer  A serving is:    1 tablespoon sugar, maple syrup, or honey    1 tablespoon jam or jelly    1 half-ounce jelly beans (about 15)    1 cup lemonade  Best choices: Dried fruit can be a satisfying sweet. Choose low-fat sweets. And watch your serving sizes!      For more on the DASH eating plan, visit:  www.nhlbi.nih.gov/health/health-topics/topics/dash   Jennifer last reviewed this educational content on 7/1/2019 2000-2020 [employer]. [address] [place] [address]. All rights reserved. This information is not intended as a substitute for professional medical care. Always follow your healthcare professional's instructions.    Patient Education     Obesity and Its Impact on Health  Obesity is a major health problem in the [place] and all over the world. It affects nearly 2 out of 5 [place] adults.  What is obesity?  Obesity is a term used to describe a body weight that is above a normal weight for a specific height. Obesity is measured by using BMI (body mass  index). BMI is a formula that uses a person s weight divided by their height. BMI may be used to screen for weight problems but it s important to know that BMI does not diagnose health problems or a person s body fat. A high BMI number can mean high body fat. A BMI between 18.5 and 25 is normal. A BMI between 25 and 30 falls within the overweight range. A BMI 30 or higher is within the obese range. A BMI of 40 or more is considered extreme or severe obesity.  Why is obesity a problem?   Carrying too much weight can increase your risk for many serious health issues. These include problems with your heart, lungs, blood vessels, brain, and joints. Some of the problems that can happen include:    Heart and circulation problems. These include heart disease, high blood pressure, heart rhythm problems (atrial fibrillation), and stroke    Type 2 diabetes    Certain cancers, such as colon and breast cancer    Sleep apnea and other breathing problems.    Back or joint problems, such as osteoarthritis and gout    Digestive tract problems such as gallstones and GERD (gastroesophageal reflux disease)    Depression (with severe obesity)  Carrying too much weight can also affect your quality of life. And it can keep you from doing things you want or need to do.  How can you lower your risk for problems?   The key to lowering your risk for health problems from obesity is to manage your weight. If you are overweight or obese, the first step is to lose weight. Studies show that losing as little as 5 percent of your body weight is good for your health.  An important part of losing weight involves developing health habits and behaviors. Here are some tips:    Control how much you eat. Food is your body s way to get energy (calories). But if you take in more calories than your body uses, you ll gain weight. Know your eating habits and keep your portions reasonable.    Make healthy eating choices. Choose foods with many nutrients that  give your body the energy it needs without adding extra pounds. Also limit foods with added sugar and fats.    Be more active. Exercise burns calories, which can help you manage your weight. Increase your daily movement, add aerobic activity, and include strength training.    Talk with your healthcare provider. Ask about working with a dietitian, health , exercise physiologist, or mental health provider who can support and encourage you.     Adding exercise to your day can help you control your weight.        If you have trouble losing weight, your healthcare provider may suggest medicines to help you. Weight loss (bariatric) surgery may also be an option for adults who have:    A BMI of 40 or more, or who are more than 100 pounds overweight    A BMI of 35 to less than 40 and a serious health problem such as type 2 diabetes, high blood pressure, or sleep apnea.    Not been able to stay at a healthy weight for a period of time in spite of efforts to lose weight through diet, exercise, or medicines  [employer] last reviewed this educational content on     7431-9085 [employer], [facility]. [address] [place] [address]. All rights reserved. This information is not intended as a substitute for professional medical care. Always follow your healthcare professional's instructions.    Patient Education     Eating the Right Number of Calories (5340-7738 Guidelines)  Calories are a measure of the energy you get from food. If you eat more calories than you use, you will gain weight. If you eat fewer calories than you use, you will lose weight. Below are tables that give the number of calories needed each day. Look for your gender, age, and activity level. If you stick to this number, you should neither gain nor lose weight. Note that this is an estimated number of calories.* Your exact number may differ.  Women  Age in years Low activity level (calories/day) Moderate activity level (calories/day) High  activity level (calories/day)   19 to 30 1,800-2,000 2,000-2,200 2,400   31 to 50 1,800 2,000 2,200   51 and older 1,600 1,800 2,000-2,200   Men  Age in years Low activity level  (calories/day) Moderate activity level (calories/day) High activity level (calories/day)   19 to 30 2,400-2,600 2,600-2,800 3,000   31 to 50 2,200-2,400 2,400-2,600 2,800-3,000   51 and older 2,000-2,200 2,200-2,400 2,400-2,800   These are not the same was what is in the guidelines The guidelines have many more age groups. Please expand the above to correlate with the tables found on page 78 of the guidelines.   Activity levels defined    Low. Only light physical activity such as that done during typical daily life.    Moderate. Light physical activity done during typical daily life AND physical activity equal to walking about 1.5 to 3 miles a day at 3 to 4 miles per hour.    High. Light physical activity done during typical daily life AND physical activity equal to walking more than 3 miles a day at 3 to 4 miles per hour.  *From Dietary Guidelines for Americans, 2746-0141, U.S. Department of Health and Human Services.  Jennifer last reviewed this educational content on 6/1/2017 2000-2020 [employer], [facility]. [address] [place] [address], [place] [address]. All rights reserved. This information is not intended as a substitute for professional medical care. Always follow your healthcare professional's instructions.

## 2021-01-15 ENCOUNTER — HEALTH MAINTENANCE LETTER (OUTPATIENT)
Age: 51
End: 2021-01-15

## 2021-01-22 ENCOUNTER — TELEPHONE (OUTPATIENT)
Dept: ANESTHESIOLOGY | Facility: CLINIC | Age: 51
End: 2021-01-22

## 2021-01-22 DIAGNOSIS — M53.3 SACRO-ILIAC PAIN: ICD-10-CM

## 2021-01-22 RX ORDER — TRAMADOL HYDROCHLORIDE 50 MG/1
50-100 TABLET ORAL EVERY 8 HOURS PRN
Qty: 60 TABLET | Refills: 0 | Status: SHIPPED | OUTPATIENT
Start: 2021-01-22 | End: 2021-02-21

## 2021-01-22 NOTE — TELEPHONE ENCOUNTER
I called and LVM for the pt informing her that Dr. Florian did send in #60 tablets to her pharmacy. The next time she needs a refill she does need to schedule a follow up visit with Dr. Florian.    To schedule or if you have any questions or concerns you can call [phone].    Indira Escamilla, [facility]

## 2021-01-22 NOTE — TELEPHONE ENCOUNTER
Refill request    Medication:  traMADol (ULTRAM) 50 MG tablet      MNPMP Checked: Yes    Last refilled 06/09/20 for 180 tablets      Last clinic appointment: 10/20/20  Next clinic appointment: Pt does not have a follow up scheduled      Preferred pharmacy:    [facility] [address]- [address] [facility]

## 2021-01-22 NOTE — TELEPHONE ENCOUNTER
[facility] Call Center    Phone Message    May a detailed message be left on voicemail: yes     Reason for Call: Medication Refill Request    Has the patient contacted the pharmacy for the refill? Yes   Name of medication being requested: Tramadol  Provider who prescribed the medication: Dr. Florian  Pharmacy:   [facility] [address] [address] Phone:  [phone]   Fax:  [phone]          Date medication is needed: asap,          Action Taken: Message routed to:  [facility] ([facility] pain    Travel Screening: Not Applicable

## 2021-01-23 ENCOUNTER — HEALTH MAINTENANCE LETTER (OUTPATIENT)
Age: 51
End: 2021-01-23

## 2021-02-06 DIAGNOSIS — Z87.39 HISTORY OF SERONEGATIVE INFLAMMATORY ARTHRITIS: ICD-10-CM

## 2021-02-09 RX ORDER — METHOTREXATE 2.5 MG/1
TABLET ORAL
Qty: 108 TABLET | Refills: 1 | Status: SHIPPED | OUTPATIENT
Start: 2021-02-09 | End: 2021-03-12

## 2021-02-09 NOTE — TELEPHONE ENCOUNTER
methotrexate 2.5 MG tablet  Last Written Prescription Date:  3/1/20  Last Fill Quantity: 108,   # refills: 3  Last Office Visit: 5/27/20  Future Office visit:  none    CBC RESULTS:   Recent Labs   Lab Test 09/30/20  1148   WBC 10.6   RBC 4.41   HGB 13.6   HCT 42.3   MCV 96   MCH 30.8   MCHC 32.2   RDW 13.0          Creatinine   Date Value Ref Range Status   09/30/2020 1.10 (H) 0.52 - 1.04 mg/dL Final   ]    Liver Function Studies -   Recent Labs   Lab Test 09/30/20  1148 02/26/20  1349   PROTTOTAL  --  7.0   ALBUMIN 3.5 3.5   BILITOTAL  --  0.3   ALKPHOS  --  85   AST 12 13   ALT 14 20     Return to clinic in 6 months with Dr. Saenz and me in year     Scheduling has been notified to contact the pt for appointment.      Routing refill request to provider for review/approval because: labs past due.  appt past due.

## 2021-02-15 DIAGNOSIS — G25.81 RESTLESS LEG SYNDROME: ICD-10-CM

## 2021-02-15 RX ORDER — ROPINIROLE 0.5 MG/1
TABLET, FILM COATED ORAL
Qty: 60 TABLET | Refills: 0 | Status: SHIPPED | OUTPATIENT
Start: 2021-02-15 | End: 2021-03-09

## 2021-02-15 NOTE — TELEPHONE ENCOUNTER
rOPINIRole (REQUIP) 0.5 MG tablet     Last Written Prescription Date:  12/18/2020  Last Fill Quantity: 60,   # refills: 0  Last Office Visit: 9/28/2020  Future Office visit:       Routing refill request to provider for review/approval because:  Drug not on the [facility], P or [facility] refill protocol or controlled substance    Ropinirole was refilled. She made a point of letting us know she was going out of town, but she appears to be about due for a refill anyway.  Bennett Goltz, PA-C

## 2021-02-24 ENCOUNTER — DOCUMENTATION ONLY (OUTPATIENT)
Dept: CARE COORDINATION | Facility: CLINIC | Age: 51
End: 2021-02-24

## 2021-03-09 DIAGNOSIS — G25.81 RESTLESS LEG SYNDROME: ICD-10-CM

## 2021-03-09 RX ORDER — ROPINIROLE 0.5 MG/1
1 TABLET, FILM COATED ORAL AT BEDTIME
Qty: 60 TABLET | Refills: 0 | COMMUNITY
Start: 2021-03-13 | End: 2021-04-19

## 2021-03-09 NOTE — TELEPHONE ENCOUNTER
Pended rx  Ropinirole HCL 0.5mg tablet, DISP:  60 tablets, SIG:  Take 2 tabs by mouth at bedtime.  R^0      Last Written Prescription Date:  2/15/2021  Last Fill Quantity: 60  # refills:0  Last Office Visit:1/5/2021  Future Office visit:      Routed Bennett Goltz, PA-C, for review.     Routing refill request to provider for review/approval because:  Drug not on the [facility], [facility] or [facility] refill protocol or controlled substance      I called Zayra because at our visit in January, she had been on 0.5 mg ropinirole. The prescription that got refilled said take 2 tabs. She is only taking 1 tab. She still has half of a bottle left. I will change the prescription in the chart to match what she is taking.  Bennett Goltz, PA-C

## 2021-03-11 DIAGNOSIS — G25.81 RESTLESS LEG SYNDROME: ICD-10-CM

## 2021-03-11 RX ORDER — ROPINIROLE 0.5 MG/1
0.5 TABLET, FILM COATED ORAL AT BEDTIME
Qty: 60 TABLET | Refills: 0 | OUTPATIENT
Start: 2021-03-12

## 2021-03-11 NOTE — PROGRESS NOTES
"[facility]  Rheumatology Clinic  Panchito Saenz MD  2021     Name: Zayra Ramirez  MRN: [id number]  Age: 51 year old  : 1970  Referring provider: Aime Mason    Assessment and Plan:  # Seronegative inflammatory arthritis:  Inflammatory arthritis remains greatly improved, with reduced morning stiffness and daily joint pain in the small joints of the hands and feet.  Video exam shows left shoulder reduced adduction/IR to 120 degrees in both ranges., Lab work on 2020 showed creatinine 1.1, AST and ALT normal, and CBC normal.  CRP was 19, modestly elevated.  EMG done in 2020 showed findings suggestive of mild, chronic, right L5-S1 radiculopathy.     Inflammatory arthritis is overall improved on methotrexate monotherapy. I recommend continuing methotrexate 22.5 mg once weekly. While on the drug, patient should use folic acid 1 mg daily and undergo CBC, LFTs, and creatinine every 3-4 months.      # Impingement, left shoulder:  Left shoulder range of motion is improving slowly.  Continue performing daily physical therapy exercises and  \"cane raising\" exercises to maintain flexion range of motion on the left.    # Tobacco abuse:   I counseled the patient to quit smoking; she is cutting back, still smoking 15 cigs a day.    Follow-up: Return in about 6 months (around 2020) for follow up with Keyon Branham.    HPI:   Zayra Ramirez follows up for seronegative inflammatory arthritis and lower back pain.  She was last seen by Keyon Branham in May 2020, when seronegative inflammatory arthritis was judged quiesced sent.  Recommendation was to continue methotrexate monotherapy at 22.5 mg once weekly.    Interval history 2021:    She has hoarse voice for 1 week. No ST. No cough or SOB.    She has had a flare of joint pain near the L shoulder and radiates into upper outer arm. ROM is stable and gradually improving with exercise and chiropracty.    Former small joint predominant pain " in the hands and feet is no longer present.  There is negligible morning stiffness, and minimal disruption of activities of daily living due to joint pain other than the shoulder.  She continues on 9 methotrexate tabs weekly.     May 27, 2020 Keyon MAHARAJ, CNP, MSN    Rheumatoid arthritis (RA) improved, and doing well. Doing accupuncture for bilateral shoulder pain, chronic is now fully resolved. She is doing daily HEP from prior Physical Therapy and noticing improved ROM. No residual issues from epidural spinal abscess last year. Denies any fever, chills, SOB, BLANK, night sweats, or chest pain, high fever, cough, travel in last 14 days or exposure to covid-19 (coronavirus). Reports healthy. Staying home and following CDC guidelines. Knees mild red and pain with swelling, with weather changes but resolved on own with ice <1 day. Feet, hands, wrists are good. No EAS      Methotrexate  22.9 mg every 7 days MON, folic acid  1 mg day, naproxsyn 500 mg BID prn not taking and not effective, tolerating no side-effects. On prilosec.     Today, the patient reports some discomfort in the left shoulder, but is otherwise well. She states she had a short course of prednisone for a couple weeks, but has been off of this now for a matter of weeks. She explains that her short course of prednisone was helpful. She noted the return of left shoulder pain after stopping prednisone as well. She reports that she has also had 4 doses of methotrexate (9 tablets) since our last visit. She also takes folic acid daily with the exception of the days she takes methotrexate.     She reports that she has morning joint stiffness for around one hour and is unsure if this is improved. She states that she takes naproxen for back pain. She denies significant ankle pain or other new symptoms or concerns. She states that she performs physical therapy exercises daily for her shoulder including stretching and wall-walking.     Patient was seen by   Clara in Infectious Disease on September 25th in follow-up of spinal epidural abscess.  Normalization of inflammatory markers and markedly improved symptoms were recorded. Completion of a 6-week course of cefazolin for a spinal abscess was recommended.     She reports that her right fingers/hand have seen to move or twitch as noticed by her family.     Patient saw Dr. Florian in [facility] in follow-up of lumbar spondylosis on November 4th. Chronic lower right back pain was reported. Improvement in pain after prior RF ablation at bilateral L2-L5 was noted. Plans to repeat the radiofrequency ablation procedure were arranged.     Review of Systems:   Pertinent items are noted in HPI or as below, remainder of complete ROS is negative.      No recent problems with hearing or vision. No swallowing problems.   No breathing difficulty, shortness of breath, coughing, or wheezing.  No chest pain or palpitations.  No heart burn, indigestion, abdominal pain, nausea, vomiting, diarrhea.  No urination problems, no bloody, cloudy urine, no dysuria.  No numbing, tingling, weakness.  No headaches or confusion.  No rashes. No easy bleeding or bruising.     Active Medications:   Current Outpatient Medications   Medication     albuterol (PROAIR HFA, PROVENTIL HFA, VENTOLIN HFA) 108 (90 BASE) MCG/ACT inhaler     busPIRone HCl (BUSPAR) 30 MG tablet     cholecalciferol ( ULTRA STRENGTH) 2000 units CAPS     cyclobenzaprine (FLEXERIL) 10 MG tablet     DULoxetine (CYMBALTA) 60 MG capsule     ferrous sulfate (FEROSUL) 325 (65 Fe) MG tablet     fluticasone-salmeterol (ADVAIR) 500-50 MCG/DOSE diskus inhaler     folic acid (FOLVITE) 1 MG tablet     lisinopril-hydrochlorothiazide (ZESTORETIC) 20-25 MG tablet     methotrexate sodium 2.5 MG TABS     montelukast (SINGULAIR) 10 MG tablet     nystatin (MYCOSTATIN) 227078 UNIT/GM external cream     omeprazole (PRILOSEC) 40 MG DR capsule     OXYGEN-HELIUM IN     pregabalin (LYRICA) 150 MG capsule      [START ON 3/13/2021] rOPINIRole (REQUIP) 0.5 MG tablet     vitamin C 500 MG TABS     zinc sulfate (ZINCATE) 220 (50 Zn) MG capsule     ARIPiprazole (ABILIFY) 15 MG tablet     EPINEPHrine (EPIPEN/ADRENACLICK/OR ANY BX GENERIC EQUIV) 0.3 MG/0.3ML injection 2-pack     No current facility-administered medications for this visit.      Facility-Administered Medications Ordered in Other Visits   Medication     Lidocaine 1 % injection 0.5-5 mL     sodium chloride (PF) 0.9% PF flush 5-50 mL         Allergies:   Bee Venom       Bees      Doxycycline       Erythromycin     Hydrocodone-Acetaminophen                 Past Medical History:  Remarkable for moderate, persistent asthma, hypertension, bipolar II disorder, interstitial lung disease, cervical stenosis with myelopathy 2017.       Chronic midline low back pain  Facial cellulitis  Morbid (severe) obesity due to excess calories  Dental abscess  Hypoxia  Subcutaneous emphysema  Borderline personality disorder  Stenosis of cervical spine with myelopathy  Esophageal stricture  Gastroesophageal reflux disease   Hypertension   Interstitial lung disease  Moderate persistent asthma without complication  Onychomycosis  Restless leg syndrome  Seasonal allergies  Smoker  Stress  Respiratory bronchiolitis interstitial lung disease     Past Surgical History:  Remarkable for surgical fusion  Hysterectomy 1997  rectocele and Cystocele surgery  Cholecystectomy     Family History:    The patient's family history includes Asthma in her mother; Back Pain in her father; Hypertension in her father; Other Cancer in her father.    Social History:  The patient reports that she has been smoking cigarettes, around 0.5 packs per day. She started smoking about 23 years ago. She has a 30.00 pack-year smoking history. She has never used smokeless tobacco. She reports that she does not drink alcohol or use drugs.   PCP: Adam Del Toro  Marital Status: Single     Physical Exam:   LMP  (LMP  Unknown)    Wt Readings from Last 4 Encounters:   01/05/21 99.8 kg (220 lb)   11/17/20 112.5 kg (248 lb)   09/25/20 112.5 kg (248 lb)   09/15/20 112.5 kg (248 lb)     Constitutional: Well-developed, appearing stated age; cooperative.  Eyes: Normal EOM, PERRLA, vision, conjunctiva, sclera.  ENT: Normal external ears, nose, hearing, lips, teeth, gums, throat. No mucous membrane lesions.  Neck: No mass or thyroid enlargement.  Resp: Breathing unlabored  MS: Left shoulder shows abduction of 110 degrees, and flexion of 120 degrees.  External rotation in the left shoulder is intact.  Skin: No nail pitting, alopecia, rash, nodules or lesions.  Neuro: Normal cranial nerves  Psych: Normal judgement, orientation, memory, affect.    Laboratory:   RHEUM RESULTS Latest Ref Rng & Units 9/23/2019 2/26/2020 9/30/2020   SED RATE 0 - 20 mm/h 15 14 -   CRP, INFLAMMATION 0.0 - 8.0 mg/L 15.4(H) 15.1(H) 19.0(H)   AST 0 - 45 U/L 18 13 12   ALT 0 - 50 U/L 8 20 14   ALBUMIN 3.4 - 5.0 g/dL 3.5 3.5 3.5   WBC 4.0 - 11.0 10e9/L 6.4 8.5 10.6   RBC 3.8 - 5.2 10e12/L 4.04 4.53 4.41   HGB 11.7 - 15.7 g/dL 12.7 14.1 13.6   HCT 35.0 - 47.0 % 38.9 43.4 42.3   MCV 78 - 100 fl 96 96 96   MCHC 31.5 - 36.5 g/dL 32.6 32.5 32.2   RDW 10.0 - 15.0 % 13.6 14.5 13.0    - 450 10e9/L 220 304 266   CREATININE 0.52 - 1.04 mg/dL 0.57 0.81 1.10(H)   GFR ESTIMATE, IF BLACK >60 mL/min/[1.73:m2] >90 >90 68   GFR ESTIMATE >60 mL/min/[1.73:m2] >90 85 59(L)     Per patient, although both shoulders are painful, the L is worse.    Zayra is a 51 year old who is being evaluated via a billable video visit.      How would you like to obtain your AVS? MyChart    Will anyone else be joining your video visit? No      Video Start Time: 12:08 PM  Video-Visit Details    Type of service:  Video Visit    Video End Time:12:32 PM    Originating Location (pt. Location): Home    Distant Location (provider location):  [facility] RHEUMATOLOGY CLINIC [facility]  used for Video Visit: Rosey

## 2021-03-11 NOTE — TELEPHONE ENCOUNTER
Pended rx ropinirole HCL 0.5 mg tabs, DISP:  60 tabs, SIG:  Take 1 tablet by mouth at bedtime, R^0        Last Written Prescription Date:  3/9/2021 did not eprescribe.     Routed to Bennett Goltz, PA-C for review.      Routing refill request to provider for review/approval because:  Drug not on the [facility] [place], [facility] or [facility] refill protocol or controlled substance

## 2021-03-12 ENCOUNTER — TELEPHONE (OUTPATIENT)
Dept: ANESTHESIOLOGY | Facility: CLINIC | Age: 51
End: 2021-03-12

## 2021-03-12 ENCOUNTER — VIRTUAL VISIT (OUTPATIENT)
Dept: RHEUMATOLOGY | Facility: CLINIC | Age: 51
End: 2021-03-12
Attending: INTERNAL MEDICINE
Payer: COMMERCIAL

## 2021-03-12 DIAGNOSIS — Z87.39 HISTORY OF SERONEGATIVE INFLAMMATORY ARTHRITIS: ICD-10-CM

## 2021-03-12 PROCEDURE — 99214 OFFICE O/P EST MOD 30 MIN: CPT | Mod: 95 | Performed by: INTERNAL MEDICINE

## 2021-03-12 RX ORDER — METHOTREXATE 2.5 MG/1
TABLET ORAL
Qty: 108 TABLET | Refills: 5 | Status: SHIPPED | OUTPATIENT
Start: 2021-03-12 | End: 2021-12-17

## 2021-03-12 RX ORDER — FOLIC ACID 1 MG/1
1 TABLET ORAL DAILY
Qty: 90 TABLET | Refills: 3 | Status: SHIPPED | OUTPATIENT
Start: 2021-03-12 | End: 2021-12-17

## 2021-03-12 NOTE — LETTER
"3/12/2021       RE: Zayra Ramirez  [address] [place] [address]     Dear Colleague,    Thank you for referring your patient, Zayra Ramirez, to the [facility] RHEUMATOLOGY CLINIC [place] at [facility]. Please see a copy of my visit note below.    [facility]  Rheumatology Clinic  Panchito Saenz MD  2021     Name: Zayra Ramirez  MRN: [id number]  Age: 51 year old  : 1970  Referring provider: Aime Mason    Assessment and Plan:  # Seronegative inflammatory arthritis:  Inflammatory arthritis remains greatly improved, with reduced morning stiffness and daily joint pain in the small joints of the hands and feet.  Video exam shows left shoulder reduced adduction/IR to 120 degrees in both ranges., Lab work on 2020 showed creatinine 1.1, AST and ALT normal, and CBC normal.  CRP was 19, modestly elevated.  EMG done in 2020 showed findings suggestive of mild, chronic, right L5-S1 radiculopathy.     Inflammatory arthritis is overall improved on methotrexate monotherapy. I recommend continuing methotrexate 22.5 mg once weekly. While on the drug, patient should use folic acid 1 mg daily and undergo CBC, LFTs, and creatinine every 3-4 months.      # Impingement, left shoulder:  Left shoulder range of motion is improving slowly.  Continue performing daily physical therapy exercises and  \"cane raising\" exercises to maintain flexion range of motion on the left.    # Tobacco abuse:   I counseled the patient to quit smoking; she is cutting back, still smoking 15 cigs a day.    Follow-up: Return in about 6 months (around 2020) for follow up with Keyon Branham.    HPI:   Zayra Ramirez follows up for seronegative inflammatory arthritis and lower back pain.  She was last seen by Keyon Branham in May 2020, when seronegative inflammatory arthritis was judged quiesced sent.  Recommendation was to continue methotrexate " monotherapy at 22.5 mg once weekly.    Interval history 03-:    She has hoarse voice for 1 week. No ST. No cough or SOB.    She has had a flare of joint pain near the L shoulder and radiates into upper outer arm. ROM is stable and gradually improving with exercise and chiropracty.    Former small joint predominant pain in the hands and feet is no longer present.  There is negligible morning stiffness, and minimal disruption of activities of daily living due to joint pain other than the shoulder.  She continues on 9 methotrexate tabs weekly.     May 27, 2020 Keyon Branham APRN, CNP, MSN    Rheumatoid arthritis (RA) improved, and doing well. Doing accupuncture for bilateral shoulder pain, chronic is now fully resolved. She is doing daily HEP from prior Physical Therapy and noticing improved ROM. No residual issues from epidural spinal abscess last year. Denies any fever, chills, SOB, BLANK, night sweats, or chest pain, high fever, cough, travel in last 14 days or exposure to covid-19 (coronavirus). Reports healthy. Staying home and following CDC guidelines. Knees mild red and pain with swelling, with weather changes but resolved on own with ice <1 day. Feet, hands, wrists are good. No EAS      Methotrexate  22.9 mg every 7 days MON, folic acid  1 mg day, naproxsyn 500 mg BID prn not taking and not effective, tolerating no side-effects. On prilosec.     Today, the patient reports some discomfort in the left shoulder, but is otherwise well. She states she had a short course of prednisone for a couple weeks, but has been off of this now for a matter of weeks. She explains that her short course of prednisone was helpful. She noted the return of left shoulder pain after stopping prednisone as well. She reports that she has also had 4 doses of methotrexate (9 tablets) since our last visit. She also takes folic acid daily with the exception of the days she takes methotrexate.     She reports that she has morning joint  stiffness for around one hour and is unsure if this is improved. She states that she takes naproxen for back pain. She denies significant ankle pain or other new symptoms or concerns. She states that she performs physical therapy exercises daily for her shoulder including stretching and wall-walking.     Patient was seen by Dr. Elizabeth in Infectious Disease on September 25th in follow-up of spinal epidural abscess.  Normalization of inflammatory markers and markedly improved symptoms were recorded. Completion of a 6-week course of cefazolin for a spinal abscess was recommended.     She reports that her right fingers/hand have seen to move or twitch as noticed by her family.     Patient saw Dr. Florian in [facility] in follow-up of lumbar spondylosis on November 4th. Chronic lower right back pain was reported. Improvement in pain after prior RF ablation at bilateral L2-L5 was noted. Plans to repeat the radiofrequency ablation procedure were arranged.     Review of Systems:   Pertinent items are noted in HPI or as below, remainder of complete ROS is negative.      No recent problems with hearing or vision. No swallowing problems.   No breathing difficulty, shortness of breath, coughing, or wheezing.  No chest pain or palpitations.  No heart burn, indigestion, abdominal pain, nausea, vomiting, diarrhea.  No urination problems, no bloody, cloudy urine, no dysuria.  No numbing, tingling, weakness.  No headaches or confusion.  No rashes. No easy bleeding or bruising.     Active Medications:   Current Outpatient Medications   Medication     albuterol (PROAIR HFA, PROVENTIL HFA, VENTOLIN HFA) 108 (90 BASE) MCG/ACT inhaler     busPIRone HCl (BUSPAR) 30 MG tablet     cholecalciferol ( ULTRA STRENGTH) 2000 units CAPS     cyclobenzaprine (FLEXERIL) 10 MG tablet     DULoxetine (CYMBALTA) 60 MG capsule     ferrous sulfate (FEROSUL) 325 (65 Fe) MG tablet     fluticasone-salmeterol (ADVAIR) 500-50 MCG/DOSE diskus inhaler      folic acid (FOLVITE) 1 MG tablet     lisinopril-hydrochlorothiazide (ZESTORETIC) 20-25 MG tablet     methotrexate sodium 2.5 MG TABS     montelukast (SINGULAIR) 10 MG tablet     nystatin (MYCOSTATIN) 747740 UNIT/GM external cream     omeprazole (PRILOSEC) 40 MG DR capsule     OXYGEN-HELIUM IN     pregabalin (LYRICA) 150 MG capsule     [START ON 3/13/2021] rOPINIRole (REQUIP) 0.5 MG tablet     vitamin C 500 MG TABS     zinc sulfate (ZINCATE) 220 (50 Zn) MG capsule     ARIPiprazole (ABILIFY) 15 MG tablet     EPINEPHrine (EPIPEN/ADRENACLICK/OR ANY BX GENERIC EQUIV) 0.3 MG/0.3ML injection 2-pack     No current facility-administered medications for this visit.      Facility-Administered Medications Ordered in Other Visits   Medication     Lidocaine 1 % injection 0.5-5 mL     sodium chloride (PF) 0.9% PF flush 5-50 mL         Allergies:   Bee Venom       Bees      Doxycycline       Erythromycin     Hydrocodone-Acetaminophen                 Past Medical History:  Remarkable for moderate, persistent asthma, hypertension, bipolar II disorder, interstitial lung disease, cervical stenosis with myelopathy 2017.       Chronic midline low back pain  Facial cellulitis  Morbid (severe) obesity due to excess calories  Dental abscess  Hypoxia  Subcutaneous emphysema  Borderline personality disorder  Stenosis of cervical spine with myelopathy  Esophageal stricture  Gastroesophageal reflux disease   Hypertension   Interstitial lung disease  Moderate persistent asthma without complication  Onychomycosis  Restless leg syndrome  Seasonal allergies  Smoker  Stress  Respiratory bronchiolitis interstitial lung disease     Past Surgical History:  Remarkable for surgical fusion  Hysterectomy 1997  rectocele and Cystocele surgery  Cholecystectomy     Family History:    The patient's family history includes Asthma in her mother; Back Pain in her father; Hypertension in her father; Other Cancer in her father.    Social History:  The patient  reports that she has been smoking cigarettes, around 0.5 packs per day. She started smoking about 23 years ago. She has a 30.00 pack-year smoking history. She has never used smokeless tobacco. She reports that she does not drink alcohol or use drugs.   PCP: Adam Del Toro  Marital Status: Single     Physical Exam:   LMP  (LMP Unknown)    Wt Readings from Last 4 Encounters:   01/05/21 99.8 kg (220 lb)   11/17/20 112.5 kg (248 lb)   09/25/20 112.5 kg (248 lb)   09/15/20 112.5 kg (248 lb)     Constitutional: Well-developed, appearing stated age; cooperative.  Eyes: Normal EOM, PERRLA, vision, conjunctiva, sclera.  ENT: Normal external ears, nose, hearing, lips, teeth, gums, throat. No mucous membrane lesions.  Neck: No mass or thyroid enlargement.  Resp: Breathing unlabored  MS: Left shoulder shows abduction of 110 degrees, and flexion of 120 degrees.  External rotation in the left shoulder is intact.  Skin: No nail pitting, alopecia, rash, nodules or lesions.  Neuro: Normal cranial nerves  Psych: Normal judgement, orientation, memory, affect.    Laboratory:   RHEUM RESULTS Latest Ref Rng & Units 9/23/2019 2/26/2020 9/30/2020   SED RATE 0 - 20 mm/h 15 14 -   CRP, INFLAMMATION 0.0 - 8.0 mg/L 15.4(H) 15.1(H) 19.0(H)   AST 0 - 45 U/L 18 13 12   ALT 0 - 50 U/L 8 20 14   ALBUMIN 3.4 - 5.0 g/dL 3.5 3.5 3.5   WBC 4.0 - 11.0 10e9/L 6.4 8.5 10.6   RBC 3.8 - 5.2 10e12/L 4.04 4.53 4.41   HGB 11.7 - 15.7 g/dL 12.7 14.1 13.6   HCT 35.0 - 47.0 % 38.9 43.4 42.3   MCV 78 - 100 fl 96 96 96   MCHC 31.5 - 36.5 g/dL 32.6 32.5 32.2   RDW 10.0 - 15.0 % 13.6 14.5 13.0    - 450 10e9/L 220 304 266   CREATININE 0.52 - 1.04 mg/dL 0.57 0.81 1.10(H)   GFR ESTIMATE, IF BLACK >60 mL/min/[1.73:m2] >90 >90 68   GFR ESTIMATE >60 mL/min/[1.73:m2] >90 85 59(L)     Per patient, although both shoulders are painful, the L is worse.    Zayra is a 51 year old who is being evaluated via a billable video visit.      How would you like to obtain  your AVS? MyChart    Will anyone else be joining your video visit? No      Video Start Time: 12:08 PM  Video-Visit Details    Type of service:  Video Visit    Video End Time:12:32 PM    Originating Location (pt. Location): Home    Distant Location (provider location):  [facility] RHEUMATOLOGY CLINIC [place]     Platform used for Video Visit: [employer]        Again, thank you for allowing me to participate in the care of your patient.      Sincerely,    Panchito Saenz MD

## 2021-03-12 NOTE — TELEPHONE ENCOUNTER
[facility] Call Center    Phone Message    May a detailed message be left on voicemail: yes     Reason for Call: Medication Refill Request    Has the patient contacted the pharmacy for the refill? Yes   Name of medication being requested: Tramadol  Provider who prescribed the medication: Dr. Florian  Pharmacy: [facility] in [place]  Date medication is needed: As soon as possible     Pt would like a call when this has been done please      Action Taken: Message routed to:  [facility] Pain    Travel Screening: Not Applicable

## 2021-03-15 ENCOUNTER — TELEPHONE (OUTPATIENT)
Dept: RHEUMATOLOGY | Facility: CLINIC | Age: 51
End: 2021-03-15

## 2021-03-15 ENCOUNTER — MYC MEDICAL ADVICE (OUTPATIENT)
Dept: RHEUMATOLOGY | Facility: CLINIC | Age: 51
End: 2021-03-15

## 2021-03-15 NOTE — TELEPHONE ENCOUNTER
I called and LVM for the pt informing her that Dr. Florian needs to see the pt in person or for a video visit follow up before refilling the pt's medications.    To schedule a follow up or if you have any questions or concerns you can call us back at [phone].    Indira Escamilla, GRETA

## 2021-03-15 NOTE — TELEPHONE ENCOUNTER
[facility] Call Center    Phone Message    May a detailed message be left on voicemail: yes     Reason for Call: Other: Pt is calling back to check the status of this refill request, please call pt when this has been done.     Action Taken: Message routed to:  [facility] Pain    Travel Screening: Not Applicable

## 2021-03-15 NOTE — TELEPHONE ENCOUNTER
Spoke to Zayra and she says that she was diagnosed with thrush and was given some type of medication (she does not know what it was) from the ED; and she thinks she was also prescribed prednisone or some other steroid.     She has not taken her Methotrexate and she will wait until she will hear what Dr. Saenz will recommend.     Becky Avina MSN, RN  Rheumatology RN Care Coordinator  [facility]

## 2021-03-15 NOTE — TELEPHONE ENCOUNTER
Spoke to Zayra and provided the below from Dr. Saenz:     Thanks for the note.  We hardly ever see thrush or surface fungal infection associated with moderate dose methotrexate use.  Much more common would be Candida infections associated with steroid use.  I recommend checking to see whether the inhaled corticosteroids that patient uses could be contributing to fungal infections.  If the infection recurs after treatment with antifungal, please follow-up with rheumatology again.        Becky Avina MSN, RN  Rheumatology RN Care Coordinator  [facility]

## 2021-03-15 NOTE — TELEPHONE ENCOUNTER
[facility] Call Center    Phone Message    May a detailed message be left on voicemail: yes     Reason for Call: Other: Patient was in [facility] in [place] over the weekend, she is a yeast infection on her skin and in her throat, and the  doctors suggested that this might be due to the Methotrexate prescription. Please is due to take her next dose today but she is not sure if she should take it. Please call patient to discuss options.      Action Taken: Message routed to:  [facility] Dr Panchito Saenz    Travel Screening: Not Applicable

## 2021-03-15 NOTE — TELEPHONE ENCOUNTER
Thanks for the note.  We hardly ever see thrush or surface fungal infection associated with moderate dose methotrexate use.  Much more common would be Candida infections associated with steroid use.  I recommend checking to see whether the inhaled corticosteroids that patient uses could be contributing to fungal infections.  If the infection recurs after treatment with antifungal, please follow-up with rheumatology again.

## 2021-03-15 NOTE — TELEPHONE ENCOUNTER
Left message for Zayra to return my call but will also send her mychart message as well.     Becky Avina MSN, RN  Rheumatology RN Care Coordinator   [place]       Walk in

## 2021-03-16 ENCOUNTER — VIRTUAL VISIT (OUTPATIENT)
Dept: ANESTHESIOLOGY | Facility: CLINIC | Age: 51
End: 2021-03-16
Payer: COMMERCIAL

## 2021-03-16 ENCOUNTER — MYC MEDICAL ADVICE (OUTPATIENT)
Dept: ANESTHESIOLOGY | Facility: CLINIC | Age: 51
End: 2021-03-16

## 2021-03-16 ENCOUNTER — VIRTUAL VISIT (OUTPATIENT)
Dept: INTERNAL MEDICINE | Facility: CLINIC | Age: 51
End: 2021-03-16
Payer: COMMERCIAL

## 2021-03-16 DIAGNOSIS — Z53.9 ERRONEOUS ENCOUNTER--DISREGARD: Primary | ICD-10-CM

## 2021-03-16 DIAGNOSIS — M54.16 CHRONIC LUMBAR RADICULOPATHY: Primary | ICD-10-CM

## 2021-03-16 PROCEDURE — 99207 PR NO BILLABLE SERVICE THIS VISIT: CPT | Performed by: NURSE PRACTITIONER

## 2021-03-16 PROCEDURE — 99214 OFFICE O/P EST MOD 30 MIN: CPT | Mod: 95 | Performed by: ANESTHESIOLOGY

## 2021-03-16 RX ORDER — FLUCONAZOLE 200 MG/1
TABLET ORAL
COMMUNITY
Start: 2021-03-12 | End: 2021-04-08

## 2021-03-16 RX ORDER — TRAMADOL HYDROCHLORIDE 50 MG/1
50 TABLET ORAL EVERY 8 HOURS PRN
Qty: 60 TABLET | Refills: 0 | Status: SHIPPED | OUTPATIENT
Start: 2021-03-16 | End: 2021-03-19

## 2021-03-16 ASSESSMENT — PAIN SCALES - GENERAL: PAINLEVEL: MODERATE PAIN (5)

## 2021-03-16 NOTE — PROGRESS NOTES
Type of service:  Video Visit    Video Start Time: 9: 30 AM    Video End Time:9:47 AM    Originating Location (pt. Location): Home    Distant Location (provider location):  [facility] [place]     Platform used for Video Visit: [facility]    Pain clinic follow up note    HPI:   Zayra Ramirez is a 51 year old year old female  who presents to the pain clinic with low back pain, s/p LRFA    Patient Supplied Answers To the [facility] Pain Questionnaire  [facility] -  Patient Entered Questionnaire/Answers 3/16/2021   What number best describes your pain right now:  0 = No pain  to  10 = Worst pain imaginable 5   How would you describe the pain? dull, aching   Which of the following worsen your pain? lying down, standing, sitting, walking, exercise   Which of the following improve or reduce your pain?  medication   What number best describes your average pain for the past week:  0 = No pain  to  10 = Worst pain imaginable 4   What number best describes your LOWEST pain in past 24 hours:  0 = No pain  to  10 = Worst pain imaginable 3   What number best describes your WORST pain in past 24 hours:  0 = No pain  to  10 = Worst pain imaginable 5   When is your pain worst? Constant   What non-medicine treatments have you already had for your pain? none   Have you tried treating your pain with medication?  No   Are you currently taking medications for your pain? No             Ms. Ramirez presents to the clinic with ongoing right lower back pain. She reports that she had improvement pain after LRFA. However over the last few weeks her pain returned in the right lower back with radiation to the right buttock area. Her last LESI which was helpful was completed in September. She is currently using 0-3 tablets of tramadol for back pain flare. She denies any side effects.    ROS:  Review of Systems   All other systems reviewed and are negative.    Answers for HPI/ROS submitted by the patient on  3/16/2021   General Symptoms: No  Skin Symptoms: No  HENT Symptoms: No  EYE SYMPTOMS: No  HEART SYMPTOMS: No  LUNG SYMPTOMS: No  INTESTINAL SYMPTOMS: No  URINARY SYMPTOMS: No  GYNECOLOGIC SYMPTOMS: No  BREAST SYMPTOMS: No  SKELETAL SYMPTOMS: No  BLOOD SYMPTOMS: No  NERVOUS SYSTEM SYMPTOMS: No  MENTAL HEALTH SYMPTOMS: No      Significant Medical History:   Past Medical History:   Diagnosis Date     Allergic rhinitis      Anemia      Arthritis      Asthma     copd     Dental abscess 8-2015     Depressive disorder      Gastroesophageal reflux disease      History of emphysema      Hoarseness      Hypertension      Morbid obesity with BMI of 40.0-44.9, adult (H)      Obstructive sleep apnea      Other chronic pain      Respiratory bronchiolitis interstitial lung disease (H)      Sleep apnea           Past Surgical History:  Past Surgical History:   Procedure Laterality Date     CHOLECYSTECTOMY       COLONOSCOPY       COLONOSCOPY N/A 2/6/2020    Procedure: COLONOSCOPY, WITH POLYPECTOMY AND BIOPSY;  Surgeon: Julian Mccullough MD;  Location:  GI     ENT SURGERY       ESOPHAGOSCOPY, GASTROSCOPY, DUODENOSCOPY (EGD), COMBINED N/A 2/6/2020    Procedure: ESOPHAGOGASTRODUODENOSCOPY (EGD);  Surgeon: Julian Mccullough MD;  Location:  GI     EXCISE LESION INTRAORAL Bilateral 10/3/2018    Procedure: EXCISE LESION INTRAORAL;  Wide Local Excision Of of Left Oral Cavity Ulcer;  Surgeon: Morro Mijares MD;  Location: [facility] OR     HC DRAIN SKIN ABSCESS SIMPLE/SINGLE  3/16/2012    Procedure:INCISION AND DRAINAGE, ABSCESS, SIMPLE; Surgeon:CHRISTIANO HANCOCK; Location: GI     HEAD & NECK SURGERY       HYSTERECTOMY       INCISION AND DRAINAGE ABDOMEN WASHOUT, COMBINED       INJECT EPIDURAL LUMBAR Right 9/15/2020    Procedure: Lumbar5- sacral 1 epidural steroid injection with fluoroscopy;  Surgeon: Tram Florian MD;  [facility]     INJECT SACROILIAC JOINT Bilateral 6/16/2020    Procedure: Bilateral sacroiliac  joint steroid injection with fluoroscopy;  Surgeon: Tram Florian MD;  [facility]     LAMINECTOMY THORACIC ONE LEVEL N/A 8/19/2019    Procedure: LAMINECTOMY, SPINE, THORACIC, 11-12 and Part of Lumbar 1, DRAINAGE OF EPIDURAL ABCESS, Epidural Drain Placement X 2;  Surgeon: Yadiel Beal MD;  Location: [facility] OR     RADIO FREQUENCY ABLATION / DESTRUCTION OF SACROILOAC JOINT DORSAL PRIMARY RAMUS Bilateral 12/17/2019    Procedure: Bilateral lumbar radiofrequency ablation with fluoroscopy and intravenous sedation ( Lumbar 2,3,4,5 medial branch nerves for the bilateral lumbar3-4, 4-5 and 5-sacral1 joints.;  Surgeon: Tram Florian MD;  [facility]     RADIO FREQUENCY ABLATION / DESTRUCTION OF SACROILOAC JOINT DORSAL PRIMARY RAMUS Right 11/17/2020    Procedure: Right lumbar medial branch nerve radiofrequency ablation right L2,3,4,5 nerves supplying the right L3-4, L4-5 and L5-S1 facet joints;  Surgeon: Tram Florian MD;  [facility]     spinal cord stimulator  08/08/2019     spinal cord stimulator removal  08/13/2019          Family History:  Family History   Problem Relation Age of Onset     Other Cancer Father         base of tongue cancer at age ~65     Hypertension Father      Back Pain Father      Restless Leg Syndrome Father      Asthma Mother      Restless Leg Syndrome Mother      Restless Leg Syndrome Sister           Social History:  Social History     Socioeconomic History     Marital status: Single     Spouse name: Not on file     Number of children: Not on file     Years of education: Not on file     Highest education level: Not on file   Occupational History     Not on file   Social Needs     Financial resource strain: Not on file     Food insecurity     Worry: Not on file     Inability: Not on file     Transportation needs     Medical: Not on file     Non-medical: Not on file   Tobacco Use     Smoking status: Current Every Day Smoker     Packs/day: 1.00     Years: 30.00     Pack years:  30.00     Types: Cigarettes     Start date: 1/1/1996     Smokeless tobacco: Never Used     Tobacco comment: has tried the patch   Substance and Sexual Activity     Alcohol use: No     Binge frequency: Monthly     Drug use: Yes     Types: Marijuana     Comment: very rarely      Sexual activity: Never   Lifestyle     Physical activity     Days per week: Not on file     Minutes per session: Not on file     Stress: Not on file   Relationships     Social connections     Talks on phone: Not on file     Gets together: Not on file     Attends Zoroastrianism service: Not on file     Active member of club or organization: Not on file     Attends meetings of clubs or organizations: Not on file     Relationship status: Not on file     Intimate partner violence     Fear of current or ex partner: Not on file     Emotionally abused: Not on file     Physically abused: Not on file     Forced sexual activity: Not on file   Other Topics Concern     Parent/sibling w/ CABG, MI or angioplasty before 65F 55M? Not Asked   Social History Narrative     Not on file     Social History     Social History Narrative     Not on file          Allergies:  Allergies   Allergen Reactions     Bee Venom Anaphylaxis     Bees      Doxycycline Anaphylaxis     Patient thinks it may have been just nausea and vomiting, however unable to confirm     Erythromycin Anaphylaxis and Shortness Of Breath     Other reaction(s): Vomiting     Hydrocodone-Acetaminophen Itching       Current Medications:   Current Outpatient Medications   Medication Sig Dispense Refill     albuterol (PROAIR HFA, PROVENTIL HFA, VENTOLIN HFA) 108 (90 BASE) MCG/ACT inhaler Inhale 2 puffs into the lungs every 6 hours as needed for shortness of breath / dyspnea or wheezing (PT last dose 1.23.2020)        ARIPiprazole (ABILIFY) 15 MG tablet Take 15 mg by mouth At Bedtime        busPIRone HCl (BUSPAR) 30 MG tablet Take 15 mg by mouth At Bedtime       cholecalciferol ( ULTRA STRENGTH) 2000 units  CAPS TAKE ONE CAPSULE BY MOUTH DAILY IN AM       cyclobenzaprine (FLEXERIL) 10 MG tablet Take 1 tablet (10 mg) by mouth 3 times daily (Patient taking differently: Take 10 mg by mouth At Bedtime ) 30 tablet 0     DULoxetine (CYMBALTA) 60 MG capsule Take 120 mg by mouth At Bedtime        EPINEPHrine (EPIPEN/ADRENACLICK/OR ANY BX GENERIC EQUIV) 0.3 MG/0.3ML injection 2-pack INJECT 0.3ML INTO THE MUSCLE ONCE AS NEEDED FOR ANAPHYLAXIS       ferrous sulfate (FEROSUL) 325 (65 Fe) MG tablet Take 325 mg by mouth daily (with breakfast)   0     fluticasone-salmeterol (ADVAIR) 500-50 MCG/DOSE diskus inhaler Inhale 1 puff into the lungs 2 times daily        folic acid (FOLVITE) 1 MG tablet Take 1 tablet (1 mg) by mouth daily 90 tablet 3     lisinopril-hydrochlorothiazide (ZESTORETIC) 20-25 MG tablet Take 1 tablet by mouth daily 90 tablet 1     methotrexate sodium 2.5 MG TABS TAKE 9 TABLETS BY MOUTH ONCE WEEKLY  Labs  past due. appt past due. 108 tablet 5     montelukast (SINGULAIR) 10 MG tablet Take 10 mg by mouth At Bedtime       nystatin (MYCOSTATIN) 659431 UNIT/GM external cream Apply topically 2 times daily 30 g 3     omeprazole (PRILOSEC) 40 MG DR capsule Take 40 mg by mouth as needed (PT last dose appr 2 weeks ago)       OXYGEN-HELIUM IN 2-3L PRN during the day, 3L @ night       pregabalin (LYRICA) 150 MG capsule Take 1 capsule (150 mg) by mouth 2 times daily 60 capsule 3     rOPINIRole (REQUIP) 0.5 MG tablet Take 1 tablet by mouth At Bedtime Take 1 tab by mouth at bedtime. 60 tablet 0     vitamin C 500 MG TABS Take 500 mg by mouth daily (with lunch)        zinc sulfate (ZINCATE) 220 (50 Zn) MG capsule Take 220 mg by mouth daily (with lunch)        fluconazole (DIFLUCAN) 200 MG tablet TAKE 1 TABLET BY MOUTH EVERY DAY FOR 14 DAYS         Physical Exam:     LMP  (LMP Unknown)     General Appearance: No distress, seated comfortably  Mood: Euthymic      ASSESSMENT AND PLAN:     Encounter Diagnosis:  1.  Right L5 lumbar  radiculopathy    2.  Lumbar spondylosis  3. Sacroiliac dysfunction, right  4. S/P epidural abscess with laminectomy and drainage  5. Lateral femoral cutaneous nerve entrapment right       Zayra Ramirez is a 51 year old year old female  who presents to the pain clinic with right lumbar radiculopathy, s/p LRFA with improvement in back pain    RECOMMENDATIONS:     1. Medications: We are prescribing the patient tramadol a short course until she is able to come for a LESI. Dosing, side effects, risks/benefits/alternatives were discussed with the patient in detail.    2. Procedure: We are scheduling the patient for right L5-S1 EDUARDO with fluoroscopy in the procedure suite.  Risks/benefits/alternatives were discussed.     3. We will place a referral for physical therapy.     Follow up: 4 weeks after LESI for inperson follow up.

## 2021-03-16 NOTE — PATIENT INSTRUCTIONS
Medications:    traMADol (ULTRAM) 50 MG tablet- Take 1 tablet (50 mg) by mouth every 8 hours as needed for severe pain    Please provide the clinic with a minium of 1 week notice, on all prescription refills.     Referrals:    Physical Therapy Referral placed-   If you have not heard from the scheduling office within 2 business days, please call [phone] for all locations    Procedures:    Call to schedule your procedure: [phone], option #2    Lumbar 5- sacral 1 epidural steroid injection with fluoroscopy    Your pre-procedure instructions are below, please call our clinic if you have any questions.      Recommended Follow up:      4 weeks following the procedure for an IN PERSON appointment.        Please call [phone], option #1 to schedule your clinic appointment if you don't already have an appointment scheduled.    Procedure Information related to COVID-19    Please call [phone] option #2 to schedule, reschedule, or cancel your procedure appointment.   Phones are answered Monday - Friday from 08:00 - 4:30pm.  Leave a voicemail with your name, birth date, and phone number if no one is available to take your call.     You will need to be tested for COVID-19 within 4 days (96 hours) of your procedure.  You will be called to schedule your COVID test by a central scheduling team. If you have not been contacted to schedule your test within 4 days of the scheduled procedure, call [phone]    Please be aware that the turn around time for the test is approximately 24-72 hours.   If your results are still pending the day of your procedure, you will be notified as soon as possible as the procedure may be cancelled.    Please note: You will only be contacted for positive and pending results.       At this time there are NO VISITORS allowed.  The procedure center staff will call you several days before the procedure to review important information that you will need to know for the day of the  procedure.   Please contact the clinic if you have further questions about this information.       Information related to Scheduling and Pre-Procedure Instructions:      After calling to schedule your procedure appointment, please contact the clinic to make your follow up clinic appointment 4-6 weeks after the procedure.  Clinic phone- [phone], option #1      If you are having a Diagnostic procedure, you will be given a Pain Diary to document your pain relief.   Please fax the completed form to our office.   fax number is [phone]    If you must reschedule your procedure more than two times, you must follow up in clinic before rescheduling again.        Preparing for your procedure    CAUTION - FAILURE TO FOLLOW THESE PRE-PROCEDURE INSTRUCTIONS WILL RESULT IN YOUR PROCEDURE BEING RESCHEDULED.      Your procedure: Lumbar 5- sacral 1 epidural steroid injection with fluoroscopy            You must have a  take you home after your procedure. Transportation by taxi or para-transit is okay as long as you have a responsible adult accompany you. You must provide your 's full name and contact number at time of check in.     Fasting Protocol Please have nothing to eat and drink for 2 hours before the procedure.      Medications If you take any medications, DO NOT STOP. Take your medications as usual the day of your procedure with a sip of water AT LEAST 2 HOURS PRIOR TO ARRIVAL.    Antibiotics If you are currently taking antibiotics, you must complete the entire dose 7 days prior to your scheduled procedure. You must be clear of any signs or symptoms of infection. If you begin antibiotics, please contact our clinic for instructions.     Fever, Chills, or Rash If you experience a fever of higher than 100 degrees, chills, rash, or open wounds during the one week before your procedure, please call the clinic to see if you may proceed with your procedure.      Medication Hold List  **Patients under  Cardiology/Neurology care should consult their provider prior to the pain procedure to verify pre-procedure medication instructions. The information below contains general guidelines.**    Blood Thinners If you are taking daily ASPIRIN, PLAVIX, OR OTHER BLOOD THINNERS SUCH AS COUMADIN/WARFARIN, we will need your prescribing doctor to sign a release permitting you to stop these medications. Once approved by your prescribing doctor - STOP ALL BLOOD THINNERS BASED ON THE TIME TABLE BELOW PRIOR TO YOUR PROCEDURE. If you have been instructed to stop WARFARIN(COUMADIN), you must have an INR lab drawn the day before your procedure. . Your INR must be within normal limits before we can perform your injection. MEDICATIONS CAN BE RESTARTED AFTER YOUR PROCEDURE.    14 DAY HOLD  Ticlid (ticlopidine)    10 DAY HOLD  Effient (Prasugel)    3 DAY HOLD  Xarelto (rivaroxaban) 7 DAY HOLD  Anacin, Bufferin, Ecotrin, Excedrin, Aggrenox (Aspirin)  Brilinta (ticagrelor)  Coumadin (Warfarin)  Pradexa (Dabigatran)  Elmiron (Pentosan)  Plavix (Clopidogrel Bisulfate)  Pletal (Cilostazol)    24 HOUR HOLD  Lovenox (enoxaparin)  Agrylin (Anagrelide)        Non-steroidal Anti-inflammatories (NSAIDs) DO NOT TAKE any non-steroidal anti-inflammatory medications (NSAIDs) listed on the table below. MEDICATIONS CAN BE RESTARTED AFTER YOUR PROCEDURE. Celebrex is OK to take and does not need to be discontinued.     Medications to stop:  3 DAY HOLD  Advil, Motrin (Ibuprofen)  Arthrotec (diciofenac sodium/misoprostol)  Clinoril (Sulindac)  Indocin (Indomethacin)  Lodine (Etodolac)  Toradol (Ketorolac)  Vicoprofen (Hydrocodone and Ibuprofen)  Voltaren (Diclotenac)    14 DAY HOLD  Daypro (Oxaprozin)  Feldene (Piroxicam)   7 DAY HOLD  Aleve (Naproxen sodium)  Darvon compound (contains aspirin)  Naprosyn (Naproxen)  Norgesic Forte (contains aspirin)  Mobic (Meloxicam)  Oruvall (Ketoprofen)  Percodan (contains aspirin)  Relafen  (Nabumetone)  Salsalate  Trilisate  Vitamin E (more than 400 mg per day)  Any medication containing aspirin                To speak with a nurse, schedule/reschedule/cancel a clinic appointment, or request a medication refill call: [phone]     You can also reach us by [facility]: [email or web address]

## 2021-03-16 NOTE — PROGRESS NOTES
LPN read through the instructions with pt for the recommended procedure: Lumbar 5- sacral 1 epidural steroid injection with fluoroscopy    Pt verbalized understanding to holding appropriate medication per protocol, and was agreeable to NPO policy and needing a .    Anticoagulant: None, Pt denied.    Recommended follow up: 4 weeks IN PERSON    Doreen Colbert LPN          Answers for HPI/ROS submitted by the patient on 3/16/2021   General Symptoms: No  Skin Symptoms: No  HENT Symptoms: No  EYE SYMPTOMS: No  HEART SYMPTOMS: No  LUNG SYMPTOMS: No  INTESTINAL SYMPTOMS: No  URINARY SYMPTOMS: No  GYNECOLOGIC SYMPTOMS: No  BREAST SYMPTOMS: No  SKELETAL SYMPTOMS: No  BLOOD SYMPTOMS: No  NERVOUS SYSTEM SYMPTOMS: No  MENTAL HEALTH SYMPTOMS: No

## 2021-03-16 NOTE — NURSING NOTE
Chief Complaint   Patient presents with     Establish Care     re-establish care. Pt [facility] to [facility] on [facility] and it is getting better     Kimberly Nissen, EMT at 9:20 AM on 3/16/2021    Video Visit Technology for this patient: No video technology available to patient, please call patient over the phone

## 2021-03-16 NOTE — PROGRESS NOTES
Zayra is a 51 year old who is being evaluated via a billable video visit.      How would you like to obtain your AVS? MyChart  If the video visit is dropped, the invitation should be resent by: Text to cell phone: [phone]  Will anyone else be joining your video visit? Regina Escamilla CMA

## 2021-03-16 NOTE — LETTER
3/16/2021       RE: Zayra Ramirez  [address] [place] [address]     Dear Colleague,    Thank you for referring your patient, Zayra Ramirez, to the [facility] [place] at [facility]. Please see a copy of my visit note below.    Zayra is a 51 year old who is being evaluated via a billable video visit.      How would you like to obtain your AVS? MyChart  If the video visit is dropped, the invitation should be resent by: Text to cell phone: [phone]  Will anyone else be joining your video visit? No    Indira Escamilla CMA        Type of service:  Video Visit    Video Start Time: 9: 30 AM    Video End Time:9:47 AM    Originating Location (pt. Location): Home    Distant Location (provider location):  [facility] [place]     Platform used for Video Visit: [facility]    Pain clinic follow up note    HPI:   Zayra Ramirez is a 51 year old year old female  who presents to the pain clinic with low back pain, s/p LRFA    Patient Supplied Answers To the [facility] Pain Questionnaire  [facility] Pain -  Patient Entered Questionnaire/Answers 3/16/2021   What number best describes your pain right now:  0 = No pain  to  10 = Worst pain imaginable 5   How would you describe the pain? dull, aching   Which of the following worsen your pain? lying down, standing, sitting, walking, exercise   Which of the following improve or reduce your pain?  medication   What number best describes your average pain for the past week:  0 = No pain  to  10 = Worst pain imaginable 4   What number best describes your LOWEST pain in past 24 hours:  0 = No pain  to  10 = Worst pain imaginable 3   What number best describes your WORST pain in past 24 hours:  0 = No pain  to  10 = Worst pain imaginable 5   When is your pain worst? Constant   What non-medicine treatments have you already had for your pain?  none   Have you tried treating your pain with medication?  No   Are you currently taking medications for your pain? No             Ms. Ramirez presents to the clinic with ongoing right lower back pain. She reports that she had improvement pain after LRFA. However over the last few weeks her pain returned in the right lower back with radiation to the right buttock area. Her last LESI which was helpful was completed in September. She is currently using 0-3 tablets of tramadol for back pain flare. She denies any side effects.    ROS:  Review of Systems   All other systems reviewed and are negative.    Answers for HPI/ROS submitted by the patient on 3/16/2021   General Symptoms: No  Skin Symptoms: No  HENT Symptoms: No  EYE SYMPTOMS: No  HEART SYMPTOMS: No  LUNG SYMPTOMS: No  INTESTINAL SYMPTOMS: No  URINARY SYMPTOMS: No  GYNECOLOGIC SYMPTOMS: No  BREAST SYMPTOMS: No  SKELETAL SYMPTOMS: No  BLOOD SYMPTOMS: No  NERVOUS SYSTEM SYMPTOMS: No  MENTAL HEALTH SYMPTOMS: No      Significant Medical History:   Past Medical History:   Diagnosis Date     Allergic rhinitis      Anemia      Arthritis      Asthma     copd     Dental abscess 8-2015     Depressive disorder      Gastroesophageal reflux disease      History of emphysema      Hoarseness      Hypertension      Morbid obesity with BMI of 40.0-44.9, adult (H)      Obstructive sleep apnea      Other chronic pain      Respiratory bronchiolitis interstitial lung disease (H)      Sleep apnea           Past Surgical History:  Past Surgical History:   Procedure Laterality Date     CHOLECYSTECTOMY       COLONOSCOPY       COLONOSCOPY N/A 2/6/2020    Procedure: COLONOSCOPY, WITH POLYPECTOMY AND BIOPSY;  Surgeon: Julian Mccullough MD;  Location:  GI     ENT SURGERY       ESOPHAGOSCOPY, GASTROSCOPY, DUODENOSCOPY (EGD), COMBINED N/A 2/6/2020    Procedure: ESOPHAGOGASTRODUODENOSCOPY (EGD);  Surgeon: Julian Mccullough MD;  Location:  GI     EXCISE LESION INTRAORAL Bilateral  10/3/2018    Procedure: EXCISE LESION INTRAORAL;  Wide Local Excision Of of Left Oral Cavity Ulcer;  Surgeon: Morro Mijares MD;  Location: [facility] OR     HC DRAIN SKIN ABSCESS SIMPLE/SINGLE  3/16/2012    Procedure:INCISION AND DRAINAGE, ABSCESS, SIMPLE; Surgeon:CHRISTIANO HANCOCK; Location: GI     HEAD & NECK SURGERY       HYSTERECTOMY       INCISION AND DRAINAGE ABDOMEN WASHOUT, COMBINED       INJECT EPIDURAL LUMBAR Right 9/15/2020    Procedure: Lumbar5- sacral 1 epidural steroid injection with fluoroscopy;  Surgeon: Tram Florian MD;  [facility]     INJECT SACROILIAC JOINT Bilateral 6/16/2020    Procedure: Bilateral sacroiliac joint steroid injection with fluoroscopy;  Surgeon: Tram Florian MD;  [facility]     LAMINECTOMY THORACIC ONE LEVEL N/A 8/19/2019    Procedure: LAMINECTOMY, SPINE, THORACIC, 11-12 and Part of Lumbar 1, DRAINAGE OF EPIDURAL ABCESS, Epidural Drain Placement X 2;  Surgeon: Yadiel Beal MD;  Location: [facility] OR     RADIO FREQUENCY ABLATION / DESTRUCTION OF SACROILOAC JOINT DORSAL PRIMARY RAMUS Bilateral 12/17/2019    Procedure: Bilateral lumbar radiofrequency ablation with fluoroscopy and intravenous sedation ( Lumbar 2,3,4,5 medial branch nerves for the bilateral lumbar3-4, 4-5 and 5-sacral1 joints.;  Surgeon: Tram Florian MD;  [facility]     RADIO FREQUENCY ABLATION / DESTRUCTION OF SACROILOAC JOINT DORSAL PRIMARY RAMUS Right 11/17/2020    Procedure: Right lumbar medial branch nerve radiofrequency ablation right L2,3,4,5 nerves supplying the right L3-4, L4-5 and L5-S1 facet joints;  Surgeon: Tram Florian MD;  Location: [facility] OR     spinal cord stimulator  08/08/2019     spinal cord stimulator removal  08/13/2019          Family History:  Family History   Problem Relation Age of Onset     Other Cancer Father         base of tongue cancer at age ~65     Hypertension Father      Back Pain Father      Restless Leg Syndrome Father      Asthma Mother       Restless Leg Syndrome Mother      Restless Leg Syndrome Sister           Social History:  Social History     Socioeconomic History     Marital status: Single     Spouse name: Not on file     Number of children: Not on file     Years of education: Not on file     Highest education level: Not on file   Occupational History     Not on file   Social Needs     Financial resource strain: Not on file     Food insecurity     Worry: Not on file     Inability: Not on file     Transportation needs     Medical: Not on file     Non-medical: Not on file   Tobacco Use     Smoking status: Current Every Day Smoker     Packs/day: 1.00     Years: 30.00     Pack years: 30.00     Types: Cigarettes     Start date: 1/1/1996     Smokeless tobacco: Never Used     Tobacco comment: has tried the patch   Substance and Sexual Activity     Alcohol use: No     Binge frequency: Monthly     Drug use: Yes     Types: Marijuana     Comment: very rarely      Sexual activity: Never   Lifestyle     Physical activity     Days per week: Not on file     Minutes per session: Not on file     Stress: Not on file   Relationships     Social connections     Talks on phone: Not on file     Gets together: Not on file     Attends L[employer]-day service: Not on file     Active member of club or organization: Not on file     Attends meetings of clubs or organizations: Not on file     Relationship status: Not on file     Intimate partner violence     Fear of current or ex partner: Not on file     Emotionally abused: Not on file     Physically abused: Not on file     Forced sexual activity: Not on file   Other Topics Concern     Parent/sibling w/ CABG, MI or angioplasty before 65F 55M? Not Asked   Social History Narrative     Not on file     Social History     Social History Narrative     Not on file          Allergies:  Allergies   Allergen Reactions     Bee Venom Anaphylaxis     Bees      Doxycycline Anaphylaxis     Patient thinks it may have been just nausea and vomiting,  however unable to confirm     Erythromycin Anaphylaxis and Shortness Of Breath     Other reaction(s): Vomiting     Hydrocodone-Acetaminophen Itching       Current Medications:   Current Outpatient Medications   Medication Sig Dispense Refill     albuterol (PROAIR HFA, PROVENTIL HFA, VENTOLIN HFA) 108 (90 BASE) MCG/ACT inhaler Inhale 2 puffs into the lungs every 6 hours as needed for shortness of breath / dyspnea or wheezing (PT last dose 1.23.2020)        ARIPiprazole (ABILIFY) 15 MG tablet Take 15 mg by mouth At Bedtime        busPIRone HCl (BUSPAR) 30 MG tablet Take 15 mg by mouth At Bedtime       cholecalciferol ( ULTRA STRENGTH) 2000 units CAPS TAKE ONE CAPSULE BY MOUTH DAILY IN AM       cyclobenzaprine (FLEXERIL) 10 MG tablet Take 1 tablet (10 mg) by mouth 3 times daily (Patient taking differently: Take 10 mg by mouth At Bedtime ) 30 tablet 0     DULoxetine (CYMBALTA) 60 MG capsule Take 120 mg by mouth At Bedtime        EPINEPHrine (EPIPEN/ADRENACLICK/OR ANY BX GENERIC EQUIV) 0.3 MG/0.3ML injection 2-pack INJECT 0.3ML INTO THE MUSCLE ONCE AS NEEDED FOR ANAPHYLAXIS       ferrous sulfate (FEROSUL) 325 (65 Fe) MG tablet Take 325 mg by mouth daily (with breakfast)   0     fluticasone-salmeterol (ADVAIR) 500-50 MCG/DOSE diskus inhaler Inhale 1 puff into the lungs 2 times daily        folic acid (FOLVITE) 1 MG tablet Take 1 tablet (1 mg) by mouth daily 90 tablet 3     lisinopril-hydrochlorothiazide (ZESTORETIC) 20-25 MG tablet Take 1 tablet by mouth daily 90 tablet 1     methotrexate sodium 2.5 MG TABS TAKE 9 TABLETS BY MOUTH ONCE WEEKLY  Labs  past due. appt past due. 108 tablet 5     montelukast (SINGULAIR) 10 MG tablet Take 10 mg by mouth At Bedtime       nystatin (MYCOSTATIN) 669183 UNIT/GM external cream Apply topically 2 times daily 30 g 3     omeprazole (PRILOSEC) 40 MG DR capsule Take 40 mg by mouth as needed (PT last dose appr 2 weeks ago)       OXYGEN-HELIUM IN 2-3L PRN during the day, 3L @ night        pregabalin (LYRICA) 150 MG capsule Take 1 capsule (150 mg) by mouth 2 times daily 60 capsule 3     rOPINIRole (REQUIP) 0.5 MG tablet Take 1 tablet by mouth At Bedtime Take 1 tab by mouth at bedtime. 60 tablet 0     vitamin C 500 MG TABS Take 500 mg by mouth daily (with lunch)        zinc sulfate (ZINCATE) 220 (50 Zn) MG capsule Take 220 mg by mouth daily (with lunch)        fluconazole (DIFLUCAN) 200 MG tablet TAKE 1 TABLET BY MOUTH EVERY DAY FOR 14 DAYS         Physical Exam:     LMP  (LMP Unknown)     General Appearance: No distress, seated comfortably  Mood: Euthymic      ASSESSMENT AND PLAN:     Encounter Diagnosis:  1.  Right L5 lumbar radiculopathy    2.  Lumbar spondylosis  3. Sacroiliac dysfunction, right  4. S/P epidural abscess with laminectomy and drainage  5. Lateral femoral cutaneous nerve entrapment right       Zayra Ramirez is a 51 year old year old female  who presents to the pain clinic with right lumbar radiculopathy, s/p LRFA with improvement in back pain    RECOMMENDATIONS:     1. Medications: We are prescribing the patient tramadol a short course until she is able to come for a LESI. Dosing, side effects, risks/benefits/alternatives were discussed with the patient in detail.    2. Procedure: We are scheduling the patient for right L5-S1 EDUARDO with fluoroscopy in the procedure suite.  Risks/benefits/alternatives were discussed.     3. We will place a referral for physical therapy.     Follow up: 4 weeks after LESI for inperson follow up.                   LPN read through the instructions with pt for the recommended procedure: Lumbar 5- sacral 1 epidural steroid injection with fluoroscopy    Pt verbalized understanding to holding appropriate medication per protocol, and was agreeable to NPO policy and needing a .    Anticoagulant: None, Pt denied.    Recommended follow up: 4 weeks IN PERSON    Doreen Colbert LPN    Again, thank you for allowing me to participate in the care of your  patient.      Sincerely,    Tram Florian MD

## 2021-03-17 ENCOUNTER — TELEPHONE (OUTPATIENT)
Dept: ANESTHESIOLOGY | Facility: CLINIC | Age: 51
End: 2021-03-17

## 2021-03-17 NOTE — TELEPHONE ENCOUNTER
Attempt # 1    I called Zayra to schedule a procedure with Dr. Florian. Left a voicemail with my call back number and request that they leave a good call back time if they get my voicemail.    Cassy MCCLAIN  Sudha-Op Coordinator

## 2021-03-18 ENCOUNTER — HOSPITAL ENCOUNTER (OUTPATIENT)
Facility: AMBULATORY SURGERY CENTER | Age: 51
End: 2021-03-18
Attending: ANESTHESIOLOGY
Payer: COMMERCIAL

## 2021-03-18 DIAGNOSIS — M54.16 CHRONIC LUMBAR RADICULOPATHY: ICD-10-CM

## 2021-03-28 ENCOUNTER — APPOINTMENT (OUTPATIENT)
Dept: ULTRASOUND IMAGING | Facility: CLINIC | Age: 51
End: 2021-03-28
Attending: EMERGENCY MEDICINE
Payer: COMMERCIAL

## 2021-03-28 ENCOUNTER — APPOINTMENT (OUTPATIENT)
Dept: GENERAL RADIOLOGY | Facility: CLINIC | Age: 51
End: 2021-03-28
Attending: EMERGENCY MEDICINE
Payer: COMMERCIAL

## 2021-03-28 ENCOUNTER — HOSPITAL ENCOUNTER (EMERGENCY)
Facility: CLINIC | Age: 51
Discharge: HOME | End: 2021-03-28
Attending: EMERGENCY MEDICINE | Admitting: EMERGENCY MEDICINE
Payer: COMMERCIAL

## 2021-03-28 VITALS
RESPIRATION RATE: 12 BRPM | HEART RATE: 76 BPM | TEMPERATURE: 98.2 F | DIASTOLIC BLOOD PRESSURE: 60 MMHG | OXYGEN SATURATION: 97 % | SYSTOLIC BLOOD PRESSURE: 101 MMHG

## 2021-03-28 DIAGNOSIS — M71.21 BAKER'S CYST OF KNEE, RIGHT: ICD-10-CM

## 2021-03-28 PROCEDURE — 99283 EMERGENCY DEPT VISIT LOW MDM: CPT

## 2021-03-28 PROCEDURE — 73562 X-RAY EXAM OF KNEE 3: CPT | Mod: RT

## 2021-03-28 PROCEDURE — 99284 EMERGENCY DEPT VISIT MOD MDM: CPT | Mod: 25

## 2021-03-28 PROCEDURE — 93971 EXTREMITY STUDY: CPT | Mod: RT

## 2021-03-28 NOTE — ED TRIAGE NOTES
A&O x4.  ABC's intact.      Pt arrives with c/o right knee area that started on the 26th when on the plane.

## 2021-03-28 NOTE — ED PROVIDER NOTES
"  History   Chief Complaint:  Right calf and knee pain    HPI   Zayra Ramirez is a 51 year old female who presents with right calf and knee pain. The patient reports having right calf and knee pressure that began when she was leaving from [place] during a recent vacation. She felt as if she \"pulled a muscle while sitting down.\" She states that the pain is exacerbated when she first begins walking but subsides with continued walking. She describes this pain as \"shooting and sharp.\" She denies any falls or events that triggered her pain. She currently rates her pain as a 1/10.     Review of Systems   Musculoskeletal:        Right calf and knee pain     All other systems reviewed and are negative.      Allergies:  Erythromycin  Doxycycline  Hydrocodone-Acetaminophen    Medications:  Flexeril  Proair inhaler  Singulair  CPAP  Ultram  Prinzide  Requip  Diflucan  Folvite  Methotrexate  Zestor  Lyrica  Zincate  Epipen  Ferosul  Abilify  Buspar  Cymbalta  Advair  Prilosec    Past Medical History:    Allergic rhinitis  Anemia  Arthritis  Asthma  Dental abscess  Depression  Gastroesophageal reflux disease  Emphysema  Hoarseness  Hypertension  Morbid obesity  Obstructive sleep apnea  Respiratory bronchiolitis   Chronic lumbar radiculopathy  Degenerative lumbar spinal stenosis  Calculus of gallbladder without Cholecystitis  Cholelithiasis  Lumbar spondylosis  Spinal epidural abscess  Articular disc disorder  Facial cellulitis    Past Surgical History:    Cholecystectomy  ENT surgery  Excise lesion intraoral bilateral  Hysterectomy  Laminectomy  Radio frequency ablation/destruction of sacroiliac joint dorsal   Spinal cord stimulator    Family History:    Hypertension  Cancer  Restless leg syndrome  Asthma    Social History:  The patient presents by herself    Physical Exam     Patient Vitals for the past 24 hrs:   BP Temp Temp src Pulse Resp SpO2   03/28/21 1420 92/57 -- -- -- -- --   03/28/21 1415 92/57 -- -- 76 -- -- "   03/28/21 1400 96/56 -- -- 70 -- 95 %   03/28/21 1345 93/54 -- -- 72 -- 97 %   03/28/21 1330 103/59 -- -- -- -- 97 %   03/28/21 1327 103/59 -- -- -- 12 97 %   03/28/21 1137 116/81 98.2  F (36.8  C) Oral 96 18 99 %       Physical Exam  General- alert, cooperative  Pulm- normal respiratory effort, no respiratory distress  Msk-            RLE: 2+ pulses, sensation to light touch intact, no wounds or abrasions   ROM normal without difficulty, 5/5 strength. Mild TTP behind R knee in popliteal fossa without  erythema or swelling           LLE: 2+ pulses, sensation intact to light touch, no wounds or abrasions   ROM normal without difficulty, 5/5 strength  Skin- no cyanosis or edema, no swelling, no rash or petechiae  Psych- normal mood and affect, normal behavior                 Emergency Department Course   Imaging:    US Lower Extremity Venous Duplex Right:  1. No evidence for deep venous thrombosis in the right lower extremity   veins.   2. A slightly complex fluid collection in the right popliteal fossa   measures 3.4 cm, and likely represents a Baker's cyst. This finding   appears to correspond to the region of the patient's pain.   Result per radiology.    X-ray Right Knee, 3 views:  Mild tricompartmental osteoarthrosis. Moderate effusion.   No evidence of fracture or calcified intra-articular body.   Result per radiology.     Emergency Department Course:    Reviewed:    I reviewed the patient's nursing notes, vitals, past medical records, Care Everywhere.     Assessments:    1145: I performed an exam of the patient and obtained history, as documented above.    1445: I rechecked the patient and updated them on findings. They are amenable to discharge.     Disposition:  The patient was discharged to home.       Impression & Plan   Medical Decision Making:  Patient presents with pressure to right knee and S/P a recent airplane ride. Obviously the main concern is DVT which we did evaluate her for and there is no  evidence for DVT but she does have a Baker's cyst likely in that area. She has some effusions on her right knee x-ray but no evidence of fraction or other bony injury. I suspect her pain is likely due to the Baker's cyst. She was given instruction to follow up with [facility]. I recommended ice, elevation and antiinflammatory treatment. She is not having significant pain so I asked her to monitor pain as well. Return precautions were provided.      Diagnosis:    ICD-10-CM    1. Baker's cyst of knee, right  M71.21        Scribe Disclosure:  Beto RICO, am serving as a scribe at 11:44 AM on 3/28/2021 to document services personally performed by Brant Sims MD based on my observations and the provider's statements to me.          Brant Sims MD  03/28/21 9747

## 2021-03-28 NOTE — DISCHARGE INSTRUCTIONS
The pressure behind the keen is most likely a Baker's cyst  Follow up with orthopedics for this   Ice and elevate as needed  May use OTC anti-inflammatory medications as needed

## 2021-03-29 ENCOUNTER — OFFICE VISIT (OUTPATIENT)
Dept: ORTHOPEDICS | Facility: CLINIC | Age: 51
End: 2021-03-29
Payer: COMMERCIAL

## 2021-03-29 VITALS
BODY MASS INDEX: 37.9 KG/M2 | SYSTOLIC BLOOD PRESSURE: 108 MMHG | DIASTOLIC BLOOD PRESSURE: 70 MMHG | WEIGHT: 222 LBS | HEIGHT: 64 IN

## 2021-03-29 DIAGNOSIS — M17.11 PRIMARY OSTEOARTHRITIS OF RIGHT KNEE: Primary | ICD-10-CM

## 2021-03-29 DIAGNOSIS — M25.461 EFFUSION, RIGHT KNEE: ICD-10-CM

## 2021-03-29 DIAGNOSIS — M71.21 POPLITEAL CYST, RIGHT: ICD-10-CM

## 2021-03-29 PROCEDURE — 99203 OFFICE O/P NEW LOW 30 MIN: CPT | Mod: 25 | Performed by: STUDENT IN AN ORGANIZED HEALTH CARE EDUCATION/TRAINING PROGRAM

## 2021-03-29 PROCEDURE — 20611 DRAIN/INJ JOINT/BURSA W/US: CPT | Mod: RT | Performed by: STUDENT IN AN ORGANIZED HEALTH CARE EDUCATION/TRAINING PROGRAM

## 2021-03-29 RX ORDER — ROPIVACAINE HYDROCHLORIDE 5 MG/ML
1 INJECTION, SOLUTION EPIDURAL; INFILTRATION; PERINEURAL
Status: DISCONTINUED | OUTPATIENT
Start: 2021-03-29 | End: 2022-07-14

## 2021-03-29 RX ORDER — TRIAMCINOLONE ACETONIDE 40 MG/ML
40 INJECTION, SUSPENSION INTRA-ARTICULAR; INTRAMUSCULAR
Status: DISCONTINUED | OUTPATIENT
Start: 2021-03-29 | End: 2022-07-14

## 2021-03-29 RX ADMIN — ROPIVACAINE HYDROCHLORIDE 1 ML: 5 INJECTION, SOLUTION EPIDURAL; INFILTRATION; PERINEURAL at 14:24

## 2021-03-29 RX ADMIN — TRIAMCINOLONE ACETONIDE 40 MG: 40 INJECTION, SUSPENSION INTRA-ARTICULAR; INTRAMUSCULAR at 14:24

## 2021-03-29 ASSESSMENT — MIFFLIN-ST. JEOR: SCORE: 1604.05

## 2021-03-29 NOTE — PROGRESS NOTES
ASSESSMENT & PLAN    1. Primary osteoarthritis of right knee  2. Popliteal cyst, right  3. Effusion, right knee    Patient here for ER follow-up and further evaluation management of right knee pain.  X-rays from ED reviewed with patient, though NWB there is evidence of tricompartmental degenerative changes.  History of partial meniscectomy on the side noted.  Ultrasound in the ED negative for DVT, suggestive of Baker's cyst.  We discussed the nature and relationship of these conditions, as well as available treatment options.  Given patient's upcoming busy work schedule, agreed to intra-articular steroid injection to decrease pain and swelling, and shrink cyst.  Patient to update us on her symptoms in 2-3 weeks, follow-up sooner as needed.    Gael Dumont MD  [facility] [place]    -----  Chief Complaint   Patient presents with     Right Knee - Pain       SUBJECTIVE  Zayra Ramirez is a/an 51 year old female who is seen in consultation at the request of  Brant Sims M.D. for evaluation of  Right knee pain..     The patient is seen by themselves.      Date of Onset: 2/26/21. Patient describes injury as, patient notes increased walking over the last month. She reports recent travel to [place] and on the plane ride home she noticed discomfort in the knee.  She also fell in the airport scraping her right knee and exacerbating the pain.   Location of Pain: right knee pain and pressure deep within  Worsened by: walking and pushing off on the right foot, pivoting.  Better with: nothing  Treatments tried: ice  Associated symptoms: swelling and locking or catching, sensation of instability with pivoting    Orthopedic/Surgical history: YES - right knee meniscectomy 4 years ago.  Social History/Occupation: Landscape/ gardening    No family history pertinent to patient's problem today.       OBJECTIVE:  /70 (BP Location: Right arm, Patient Position: Chair, Cuff Size: Adult  "Regular)   Ht 1.613 m (5' 3.5\")   Wt 100.7 kg (222 lb)   LMP  (LMP Unknown)   BMI 38.71 kg/m     General: healthy, alert and in no distress  HEENT: no scleral icterus or conjunctival erythema  CV: no pedal edema   Resp: normal respiratory effort without conversational dyspnea   Psych: normal mood and affect  Gait: normal    MSK:   Right knee with small to moderate effusion  No deformity, 3-4 cm abrasion over anterior aspect of knee without any significant surrounding erythema or drainage  Range of motion 0-110 degrees, extensor mechanism intact  Focal tenderness over palpable mass in popliteal base, early nontender knee otherwise  CMS intact distally    RADIOLOGY:  No new imaging studies today, prior x-rays reviewed    Large Joint Injection/Arthocentesis: R knee joint    Date/Time: 3/29/2021 2:24 PM  Performed by: Gael Dumont MD  Authorized by: Gael Dumont MD     Indications:  Pain  Needle Size:  22 G  Guidance: ultrasound    Approach:  Anterolateral  Location:  Knee      Medications:  40 mg triamcinolone 40 MG/ML; 1 mL ropivacaine 5 MG/ML  Outcome:  Tolerated well, no immediate complications  Procedure discussed: discussed risks, benefits, and alternatives    Consent Given by:  Patient  Timeout: timeout called immediately prior to procedure    Prep: patient was prepped and draped in usual sterile fashion        "

## 2021-03-29 NOTE — LETTER
3/29/2021         RE: Zayra Ramirez  [address] [place] [address]        Dear Colleague,    Thank you for referring your patient, Zayra Ramirez, to the [facility]. Please see a copy of my visit note below.    ASSESSMENT & PLAN    1. Primary osteoarthritis of right knee  2. Popliteal cyst, right  3. Effusion, right knee    Patient here for ER follow-up and further evaluation management of right knee pain.  X-rays from ED reviewed with patient, though NWB there is evidence of tricompartmental degenerative changes.  History of partial meniscectomy on the side noted.  Ultrasound in the ED negative for DVT, suggestive of Baker's cyst.  We discussed the nature and relationship of these conditions, as well as available treatment options.  Given patient's upcoming busy work schedule, agreed to intra-articular steroid injection to decrease pain and swelling, and shrink cyst.  Patient to update us on her symptoms in 2-3 weeks, follow-up sooner as needed.    Gael Dumont MD  [facility]    -----  Chief Complaint   Patient presents with     Right Knee - Pain       SUBJECTIVE  Zayra Ramirez is a/an 51 year old female who is seen in consultation at the request of  Brant Sims M.D. for evaluation of  Right knee pain..     The patient is seen by themselves.      Date of Onset: 2/26/21. Patient describes injury as, patient notes increased walking over the last month. She reports recent travel to [place] and on the plane ride home she noticed discomfort in the knee.  She also fell in the airport scraping her right knee and exacerbating the pain.   Location of Pain: right knee pain and pressure deep within  Worsened by: walking and pushing off on the right foot, pivoting.  Better with: nothing  Treatments tried: ice  Associated symptoms: swelling and locking or catching, sensation of instability with  "pivoting    Orthopedic/Surgical history: YES - right knee meniscectomy 4 years ago.  Social History/Occupation: Landscape/ gardening    No family history pertinent to patient's problem today.       OBJECTIVE:  /70 (BP Location: Right arm, Patient Position: Chair, Cuff Size: Adult Regular)   Ht 1.613 m (5' 3.5\")   Wt 100.7 kg (222 lb)   LMP  (LMP Unknown)   BMI 38.71 kg/m     General: healthy, alert and in no distress  HEENT: no scleral icterus or conjunctival erythema  CV: no pedal edema   Resp: normal respiratory effort without conversational dyspnea   Psych: normal mood and affect  Gait: normal    MSK:   Right knee with small to moderate effusion  No deformity, 3-4 cm abrasion over anterior aspect of knee without any significant surrounding erythema or drainage  Range of motion 0-110 degrees, extensor mechanism intact  Focal tenderness over palpable mass in popliteal base, early nontender knee otherwise  CMS intact distally    RADIOLOGY:  No new imaging studies today, prior x-rays reviewed    Large Joint Injection/Arthocentesis: R knee joint    Date/Time: 3/29/2021 2:24 PM  Performed by: Gael Dumont MD  Authorized by: Gael Dumont MD     Indications:  Pain  Needle Size:  22 G  Guidance: ultrasound    Approach:  Anterolateral  Location:  Knee      Medications:  40 mg triamcinolone 40 MG/ML; 1 mL ropivacaine 5 MG/ML  Outcome:  Tolerated well, no immediate complications  Procedure discussed: discussed risks, benefits, and alternatives    Consent Given by:  Patient  Timeout: timeout called immediately prior to procedure    Prep: patient was prepped and draped in usual sterile fashion            Again, thank you for allowing me to participate in the care of your patient.        Sincerely,        Gael Dumont MD    "

## 2021-03-29 NOTE — PATIENT INSTRUCTIONS
1. Primary osteoarthritis of right knee      Steroid injection of the right knee was performed today in clinic    - Would not soak in a hot tub, bath or swimming pool for 48 hours  - Ok to shower  - Ice today and only do your normal amounts of activity  - The lidocaine (what is giving you pain relief right now) will likely stop working in 1-2 hours.  You will then have pain again, similar to before you received the injection. The corticosteroid will not start working until approximately 1-2 weeks from now.  In a small percentage of people, cortisone can cause flushing/redness in the face. This usually lasts for 1-3 days and resolves. Cool compress and Ibuprofen/Tylenol can help if this happens.  Can repeat the injection anytime after 4 months    Follow up for repeat injection when pain returns.     Call with any questions, [phone]

## 2021-04-08 ENCOUNTER — OFFICE VISIT (OUTPATIENT)
Dept: INTERNAL MEDICINE | Facility: CLINIC | Age: 51
End: 2021-04-08
Payer: COMMERCIAL

## 2021-04-08 ENCOUNTER — ANCILLARY PROCEDURE (OUTPATIENT)
Dept: GENERAL RADIOLOGY | Facility: CLINIC | Age: 51
End: 2021-04-08
Attending: NURSE PRACTITIONER
Payer: COMMERCIAL

## 2021-04-08 VITALS
SYSTOLIC BLOOD PRESSURE: 104 MMHG | HEART RATE: 80 BPM | OXYGEN SATURATION: 98 % | BODY MASS INDEX: 39.23 KG/M2 | DIASTOLIC BLOOD PRESSURE: 70 MMHG | WEIGHT: 225 LBS

## 2021-04-08 DIAGNOSIS — M54.2 NECK PAIN: ICD-10-CM

## 2021-04-08 DIAGNOSIS — M19.90 INFLAMMATORY ARTHRITIS: ICD-10-CM

## 2021-04-08 DIAGNOSIS — Z79.899 HIGH RISK MEDICATION USE: ICD-10-CM

## 2021-04-08 DIAGNOSIS — R73.9 BLOOD GLUCOSE ELEVATED: ICD-10-CM

## 2021-04-08 DIAGNOSIS — R79.89 ELEVATED SERUM CREATININE: ICD-10-CM

## 2021-04-08 DIAGNOSIS — M25.812 SHOULDER IMPINGEMENT, LEFT: ICD-10-CM

## 2021-04-08 DIAGNOSIS — Z13.1 SCREENING FOR DIABETES MELLITUS: ICD-10-CM

## 2021-04-08 DIAGNOSIS — B37.9 CANDIDA INFECTION: Primary | ICD-10-CM

## 2021-04-08 DIAGNOSIS — Z13.1 ENCOUNTER FOR SCREENING EXAMINATION FOR INTERMEDIATE HYPERGLYCEMIA AND DIABETES MELLITUS: ICD-10-CM

## 2021-04-08 DIAGNOSIS — B37.2 YEAST INFECTION OF THE SKIN: ICD-10-CM

## 2021-04-08 DIAGNOSIS — B49 FUNGAL INFECTION: ICD-10-CM

## 2021-04-08 DIAGNOSIS — G06.1 SPINAL EPIDURAL ABSCESS: ICD-10-CM

## 2021-04-08 LAB
ALBUMIN SERPL-MCNC: 3 G/DL (ref 3.4–5)
ALBUMIN UR-MCNC: 30 MG/DL
ALT SERPL W P-5'-P-CCNC: 18 U/L (ref 0–50)
ANION GAP SERPL CALCULATED.3IONS-SCNC: 4 MMOL/L (ref 3–14)
APPEARANCE UR: ABNORMAL
AST SERPL W P-5'-P-CCNC: 9 U/L (ref 0–45)
BACTERIA #/AREA URNS HPF: ABNORMAL /HPF
BILIRUB UR QL STRIP: NEGATIVE
BUN SERPL-MCNC: 12 MG/DL (ref 7–30)
CALCIUM SERPL-MCNC: 9 MG/DL (ref 8.5–10.1)
CHLORIDE SERPL-SCNC: 102 MMOL/L (ref 94–109)
CO2 SERPL-SCNC: 34 MMOL/L (ref 20–32)
COLOR UR AUTO: YELLOW
CREAT SERPL-MCNC: 0.86 MG/DL (ref 0.52–1.04)
CRP SERPL-MCNC: 45.1 MG/L (ref 0–8)
ERYTHROCYTE [DISTWIDTH] IN BLOOD BY AUTOMATED COUNT: 14.8 % (ref 10–15)
GFR SERPL CREATININE-BSD FRML MDRD: 78 ML/MIN/{1.73_M2}
GLUCOSE SERPL-MCNC: 94 MG/DL (ref 70–99)
GLUCOSE UR STRIP-MCNC: NEGATIVE MG/DL
HBA1C MFR BLD: 5.8 % (ref 0–5.6)
HCT VFR BLD AUTO: 36.8 % (ref 35–47)
HGB BLD-MCNC: 12 G/DL (ref 11.7–15.7)
HGB UR QL STRIP: NEGATIVE
KETONES UR STRIP-MCNC: NEGATIVE MG/DL
LEUKOCYTE ESTERASE UR QL STRIP: NEGATIVE
MCH RBC QN AUTO: 30.6 PG (ref 26.5–33)
MCHC RBC AUTO-ENTMCNC: 32.6 G/DL (ref 31.5–36.5)
MCV RBC AUTO: 94 FL (ref 78–100)
MUCOUS THREADS #/AREA URNS LPF: PRESENT /LPF
NITRATE UR QL: NEGATIVE
PH UR STRIP: 7 PH (ref 5–7)
PLATELET # BLD AUTO: 284 10E9/L (ref 150–450)
POTASSIUM SERPL-SCNC: 3.4 MMOL/L (ref 3.4–5.3)
RBC # BLD AUTO: 3.92 10E12/L (ref 3.8–5.2)
RBC #/AREA URNS AUTO: 0 /HPF (ref 0–2)
SODIUM SERPL-SCNC: 139 MMOL/L (ref 133–144)
SOURCE: ABNORMAL
SP GR UR STRIP: 1.02 (ref 1–1.03)
SQUAMOUS #/AREA URNS AUTO: 11 /HPF (ref 0–1)
UROBILINOGEN UR STRIP-MCNC: 2 MG/DL (ref 0–2)
WBC # BLD AUTO: 9 10E9/L (ref 4–11)
WBC #/AREA URNS AUTO: 1 /HPF (ref 0–5)

## 2021-04-08 PROCEDURE — 84450 TRANSFERASE (AST) (SGOT): CPT | Performed by: PATHOLOGY

## 2021-04-08 PROCEDURE — 84460 ALANINE AMINO (ALT) (SGPT): CPT | Performed by: PATHOLOGY

## 2021-04-08 PROCEDURE — 99215 OFFICE O/P EST HI 40 MIN: CPT | Performed by: PATHOLOGY

## 2021-04-08 PROCEDURE — 81001 URINALYSIS AUTO W/SCOPE: CPT | Performed by: PATHOLOGY

## 2021-04-08 PROCEDURE — 87102 FUNGUS ISOLATION CULTURE: CPT | Mod: 90 | Performed by: PATHOLOGY

## 2021-04-08 PROCEDURE — 36415 COLL VENOUS BLD VENIPUNCTURE: CPT | Performed by: PATHOLOGY

## 2021-04-08 PROCEDURE — 99000 SPECIMEN HANDLING OFFICE-LAB: CPT | Performed by: PATHOLOGY

## 2021-04-08 PROCEDURE — 82040 ASSAY OF SERUM ALBUMIN: CPT | Performed by: PATHOLOGY

## 2021-04-08 PROCEDURE — 83036 HEMOGLOBIN GLYCOSYLATED A1C: CPT | Performed by: PATHOLOGY

## 2021-04-08 PROCEDURE — 72040 X-RAY EXAM NECK SPINE 2-3 VW: CPT | Mod: GC | Performed by: RADIOLOGY

## 2021-04-08 PROCEDURE — 85027 COMPLETE CBC AUTOMATED: CPT | Performed by: PATHOLOGY

## 2021-04-08 PROCEDURE — 80048 BASIC METABOLIC PNL TOTAL CA: CPT | Performed by: PATHOLOGY

## 2021-04-08 PROCEDURE — 87106 FUNGI IDENTIFICATION YEAST: CPT | Mod: 90 | Performed by: PATHOLOGY

## 2021-04-08 PROCEDURE — 86140 C-REACTIVE PROTEIN: CPT | Performed by: PATHOLOGY

## 2021-04-08 RX ORDER — NYSTATIN 100000 U/G
CREAM TOPICAL 2 TIMES DAILY
Qty: 30 G | Refills: 3 | Status: ON HOLD | OUTPATIENT
Start: 2021-04-08 | End: 2022-07-14

## 2021-04-08 RX ORDER — TRAMADOL HYDROCHLORIDE 50 MG/1
TABLET ORAL
COMMUNITY
Start: 2021-03-01 | End: 2021-06-29

## 2021-04-08 RX ORDER — FLUCONAZOLE 200 MG/1
TABLET ORAL
Qty: 14 TABLET | Refills: 0 | Status: SHIPPED | OUTPATIENT
Start: 2021-04-08 | End: 2021-09-21

## 2021-04-08 NOTE — PATIENT INSTRUCTIONS
Please arrive at [address] [facility], [place] [address] and park in the free parking ramp. The clinic building is next to the parking ramp; please enter the clinic through the main entrance (under canopy).

## 2021-04-08 NOTE — NURSING NOTE
Chief Complaint   Patient presents with     Derm Problem     rash happening around beginning of march, started in [place], has had similar flair ups before, special nystatin worked in the past     Kimberly Nissen, EMT at 9:20 AM on 4/8/2021

## 2021-04-09 ENCOUNTER — TELEPHONE (OUTPATIENT)
Dept: INTERNAL MEDICINE | Facility: CLINIC | Age: 51
End: 2021-04-09

## 2021-04-09 NOTE — TELEPHONE ENCOUNTER
[facility]    Phone Message    May a detailed message be left on voicemail: yes     Reason for Call: Requesting Results   Name/type of test: XR Cervical Spine 2-3 Views     Date of test: 4/8/21  Was test done at a location other than [facility] (Please fill in the location if not [facility])?: No      Action Taken: Message routed to:  [facility]    Travel Screening: Not Applicable

## 2021-04-10 PROBLEM — E74.39 GLUCOSE INTOLERANCE: Status: ACTIVE | Noted: 2021-04-10

## 2021-04-10 NOTE — PROGRESS NOTES
S:  Zayra Ramirez is a 51 year old female who is immunosuppressed c/o failure of resolution of oral Thrush and intertrigo rash.  She went to ER 3/12/21 with c/o hoarseness and tongue coating as well as rash in her right pannus area and was treated with Fluconazole. She completed that but her voice has not returned completely to normal.  She also ran out of nystatin cream 6 days ago and her and her rash under her pannus and in her groin area has returned since yesterday, and is bothersome.  She denies a hx of diabetes.    She is requesting a cervical spine xray as she is having neck stiffness and tenderness and decreased ROM.  She has a hx of C3-5 osteophytes. And subsequent fusion. In 2017.          Patient Active Problem List   Diagnosis     Chronic midline low back pain     Facial cellulitis     Morbid (severe) obesity due to excess calories (H)     Dental abscess     Hypoxia     Subcutaneous emphysema (H)     Borderline personality disorder (H)     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)     Esophageal stricture     GERD (gastroesophageal reflux disease)     HTN (hypertension)     Interstitial lung disease (H)     Moderate persistent asthma without complication     Onychomycosis     Restless leg syndrome     Seasonal allergies     Smoker     Stress     Respiratory bronchiolitis interstitial lung disease (H)     Spinal epidural abscess     Lumbar spondylosis     Inflammatory arthritis     Arthralgia of temporomandibular joint     Articular disc disorder of temporomandibular joint     Derangement of temporomandibular joint     Calculus of gallbladder without cholecystitis without obstruction     Displacement of articular disc of temporomandibular joint with reduction     Myofascial pain     Oral lesion     Symptomatic cholelithiasis     Sacro-iliac pain     Degenerative lumbar spinal stenosis     Chronic lumbar radiculopathy            Past Medical History:   Diagnosis Date     Allergic rhinitis       Anemia      Arthritis      Asthma     copd     Dental abscess 8-2015     Depressive disorder      Gastroesophageal reflux disease      History of emphysema      Hoarseness      Hypertension      Morbid obesity with BMI of 40.0-44.9, adult (H)      Obstructive sleep apnea      Other chronic pain      Respiratory bronchiolitis interstitial lung disease (H)      Sleep apnea             Past Surgical History:   Procedure Laterality Date     CHOLECYSTECTOMY       COLONOSCOPY       COLONOSCOPY N/A 2/6/2020    Procedure: COLONOSCOPY, WITH POLYPECTOMY AND BIOPSY;  Surgeon: Julian Mccullough MD;  Location:  GI     ENT SURGERY       ESOPHAGOSCOPY, GASTROSCOPY, DUODENOSCOPY (EGD), COMBINED N/A 2/6/2020    Procedure: ESOPHAGOGASTRODUODENOSCOPY (EGD);  Surgeon: Julian Mccullough MD;  Location:  GI     EXCISE LESION INTRAORAL Bilateral 10/3/2018    Procedure: EXCISE LESION INTRAORAL;  Wide Local Excision Of of Left Oral Cavity Ulcer;  Surgeon: Morro Mijares MD;  Location: [facility] OR     HC DRAIN SKIN ABSCESS SIMPLE/SINGLE  3/16/2012    Procedure:INCISION AND DRAINAGE, ABSCESS, SIMPLE; Surgeon:CHRISTIANO HANCOCK; Location: GI     HEAD & NECK SURGERY       HYSTERECTOMY       INCISION AND DRAINAGE ABDOMEN WASHOUT, COMBINED       INJECT EPIDURAL LUMBAR Right 9/15/2020    Procedure: Lumbar5- sacral 1 epidural steroid injection with fluoroscopy;  Surgeon: Tram Florian MD;  Location: [facility] OR     INJECT SACROILIAC JOINT Bilateral 6/16/2020    Procedure: Bilateral sacroiliac joint steroid injection with fluoroscopy;  Surgeon: Tram Florian MD;  Location: [facility] OR     LAMINECTOMY THORACIC ONE LEVEL N/A 8/19/2019    Procedure: LAMINECTOMY, SPINE, THORACIC, 11-12 and Part of Lumbar 1, DRAINAGE OF EPIDURAL ABCESS, Epidural Drain Placement X 2;  Surgeon: Yadiel Beal MD;  Location: [facility] OR     RADIO FREQUENCY ABLATION / DESTRUCTION OF SACROILOAC JOINT DORSAL PRIMARY RAMUS Bilateral 12/17/2019    Procedure:  Bilateral lumbar radiofrequency ablation with fluoroscopy and intravenous sedation ( Lumbar 2,3,4,5 medial branch nerves for the bilateral lumbar3-4, 4-5 and 5-sacral1 joints.;  Surgeon: Tram Florian MD;  Location:  OR     RADIO FREQUENCY ABLATION / DESTRUCTION OF SACROILOAC JOINT DORSAL PRIMARY RAMUS Right 11/17/2020    Procedure: Right lumbar medial branch nerve radiofrequency ablation right L2,3,4,5 nerves supplying the right L3-4, L4-5 and L5-S1 facet joints;  Surgeon: Tram Florian MD;  Location: [facility] OR     spinal cord stimulator  08/08/2019     spinal cord stimulator removal  08/13/2019            Social History     Tobacco Use     Smoking status: Current Every Day Smoker     Packs/day: 1.00     Years: 30.00     Pack years: 30.00     Types: Cigarettes     Start date: 1/1/1996     Smokeless tobacco: Never Used     Tobacco comment: has tried the patch   Substance Use Topics     Alcohol use: No     Binge frequency: Monthly            Family History   Problem Relation Age of Onset     Other Cancer Father         base of tongue cancer at age ~65     Hypertension Father      Back Pain Father      Restless Leg Syndrome Father      Asthma Mother      Restless Leg Syndrome Mother      Restless Leg Syndrome Sister                Allergies   Allergen Reactions     Bee Venom Anaphylaxis     Bees      Doxycycline Anaphylaxis     Patient thinks it may have been just nausea and vomiting, however unable to confirm     Erythromycin Anaphylaxis and Shortness Of Breath     Other reaction(s): Vomiting     Hydrocodone-Acetaminophen Itching            Current Outpatient Medications   Medication Sig Dispense Refill     albuterol (PROAIR HFA, PROVENTIL HFA, VENTOLIN HFA) 108 (90 BASE) MCG/ACT inhaler Inhale 2 puffs into the lungs every 6 hours as needed for shortness of breath / dyspnea or wheezing (PT last dose 1.23.2020)        ARIPiprazole (ABILIFY) 15 MG tablet Take 15 mg by mouth At Bedtime        busPIRone HCl (BUSPAR) 30  MG tablet Take 15 mg by mouth At Bedtime       cholecalciferol ( ULTRA STRENGTH) 2000 units CAPS TAKE ONE CAPSULE BY MOUTH DAILY IN AM       cyclobenzaprine (FLEXERIL) 10 MG tablet Take 1 tablet (10 mg) by mouth 3 times daily (Patient taking differently: Take 10 mg by mouth At Bedtime ) 30 tablet 0     DULoxetine (CYMBALTA) 60 MG capsule Take 120 mg by mouth At Bedtime        EPINEPHrine (EPIPEN/ADRENACLICK/OR ANY BX GENERIC EQUIV) 0.3 MG/0.3ML injection 2-pack INJECT 0.3ML INTO THE MUSCLE ONCE AS NEEDED FOR ANAPHYLAXIS       ferrous sulfate (FEROSUL) 325 (65 Fe) MG tablet Take 325 mg by mouth daily (with breakfast)   0     fluconazole (DIFLUCAN) 200 MG tablet TAKE 1 TABLET BY MOUTH EVERY DAY FOR 14 DAYS 14 tablet 0     fluticasone-salmeterol (ADVAIR) 500-50 MCG/DOSE diskus inhaler Inhale 1 puff into the lungs 2 times daily        folic acid (FOLVITE) 1 MG tablet Take 1 tablet (1 mg) by mouth daily 90 tablet 3     lisinopril-hydrochlorothiazide (ZESTORETIC) 20-25 MG tablet Take 1 tablet by mouth daily 90 tablet 1     methotrexate sodium 2.5 MG TABS TAKE 9 TABLETS BY MOUTH ONCE WEEKLY  Labs  past due. appt past due. 108 tablet 5     montelukast (SINGULAIR) 10 MG tablet Take 10 mg by mouth At Bedtime       nystatin (MYCOSTATIN) 575300 UNIT/GM external cream Apply topically 2 times daily 30 g 3     omeprazole (PRILOSEC) 40 MG DR capsule Take 40 mg by mouth as needed (PT last dose appr 2 weeks ago)       OXYGEN-HELIUM IN 2-3L PRN during the day, 3L @ night       pregabalin (LYRICA) 150 MG capsule Take 1 capsule (150 mg) by mouth 2 times daily 60 capsule 3     rOPINIRole (REQUIP) 0.5 MG tablet Take 1 tablet by mouth At Bedtime Take 1 tab by mouth at bedtime. 60 tablet 0     traMADol (ULTRAM) 50 MG tablet TAKE 1   2 TABLETS BY MOUTH EVERY 8 HOURS AS NEEDED FOR MODERATE TO SEVERE PAIN       vitamin C 500 MG TABS Take 500 mg by mouth daily (with lunch)        zinc sulfate (ZINCATE) 220 (50 Zn) MG capsule Take 220 mg by  mouth daily (with lunch)           REVIEW OF SYSTEMS:   See above.    O:   /70   Pulse 80   Wt 102.1 kg (225 lb)   LMP  (LMP Unknown)   SpO2 98%   BMI 39.23 kg/m    GENERAL APPEARANCE: healthy, alert and no distress  HENT: Tongue has a white coating which a sample was sent for culture.  RESP: lungs clear to auscultation - no rales, rhonchi or wheezes  CV: regular rates and rhythm, normal S1 S2, no S3 or S4 and no murmur, click or rub   NEURO: Grossly normal  MUSK:  Limited neck rotation bilaterally.  SKIN: posterior cervical surgical scar.  Right infra-pannus skin denudation,redness, inflammation as well as in the groin and under both breasts R > L.  EXT: warm.  Edema NO   PSYCHE: nl    A/P:  Jaya was seen today for derm problem.    Diagnoses and all orders for this visit:    Candida infection  -     Yeast culture    Yeast infection of the skin  -     fluconazole (DIFLUCAN) 200 MG tablet; TAKE 1 TABLET BY MOUTH EVERY DAY FOR 14 DAYS    Fungal infection  -     nystatin (MYCOSTATIN) 083977 UNIT/GM external cream; Apply topically 2 times daily    Encounter for screening examination for intermediate hyperglycemia and diabetes mellitus    Screening for diabetes mellitus    Blood glucose elevated  -     Hemoglobin A1c; Future    Neck pain  -     XR Cervical Spine 2-3 Views; Future    Results for JAYA MATHEW (MRN [id number]) as of 4/9/2021 23:23   Ref. Range 4/8/2021 10:47 4/8/2021 10:48   Sodium Latest Ref Range: 133 - 144 mmol/L 139    Potassium Latest Ref Range: 3.4 - 5.3 mmol/L 3.4    Chloride Latest Ref Range: 94 - 109 mmol/L 102    Carbon Dioxide Latest Ref Range: 20 - 32 mmol/L 34 (H)    Urea Nitrogen Latest Ref Range: 7 - 30 mg/dL 12    Creatinine Latest Ref Range: 0.52 - 1.04 mg/dL 0.86    GFR Estimate Latest Ref Range: >60 mL/min/1.73_m2 78    GFR Estimate If Black Latest Ref Range: >60 mL/min/1.73_m2 >90    Calcium Latest Ref Range: 8.5 - 10.1 mg/dL 9.0    Anion Gap Latest Ref Range: 3 - 14  mmol/L 4    Albumin Latest Ref Range: 3.4 - 5.0 g/dL 3.0 (L)    ALT Latest Ref Range: 0 - 50 U/L 18    AST Latest Ref Range: 0 - 45 U/L 9    Hemoglobin A1C Latest Ref Range: 0 - 5.6 % 5.8 (H)    CRP Inflammation Latest Ref Range: 0.0 - 8.0 mg/L 45.1 (H)    Glucose Latest Ref Range: 70 - 99 mg/dL 94    WBC Latest Ref Range: 4.0 - 11.0 10e9/L 9.0    Hemoglobin Latest Ref Range: 11.7 - 15.7 g/dL 12.0    Hematocrit Latest Ref Range: 35.0 - 47.0 % 36.8    Platelet Count Latest Ref Range: 150 - 450 10e9/L 284    RBC Count Latest Ref Range: 3.8 - 5.2 10e12/L 3.92    MCV Latest Ref Range: 78 - 100 fl 94    MCH Latest Ref Range: 26.5 - 33.0 pg 30.6    MCHC Latest Ref Range: 31.5 - 36.5 g/dL 32.6    RDW Latest Ref Range: 10.0 - 15.0 % 14.8    Color Urine Unknown  Yellow   Appearance Urine Unknown  Slightly Cloudy   Glucose Urine Latest Ref Range: NEG^Negative mg/dL  Negative   Bilirubin Urine Latest Ref Range: NEG^Negative   Negative   Ketones Urine Latest Ref Range: NEG^Negative mg/dL  Negative   Specific Gravity Urine Latest Ref Range: 1.003 - 1.035   1.021   pH Urine Latest Ref Range: 5.0 - 7.0 pH  7.0   Protein Albumin Urine Latest Ref Range: NEG^Negative mg/dL  30 (A)   Urobilinogen mg/dL Latest Ref Range: 0.0 - 2.0 mg/dL  2.0   Nitrite Urine Latest Ref Range: NEG^Negative   Negative   Blood Urine Latest Ref Range: NEG^Negative   Negative   Leukocyte Esterase Urine Latest Ref Range: NEG^Negative   Negative   Source Unknown  Midstream Urine   WBC Urine Latest Ref Range: 0 - 5 /HPF  1   RBC Urine Latest Ref Range: 0 - 2 /HPF  0   Bacteria Urine Latest Ref Range: NEG^Negative /HPF  Few (A)   Squamous Epithelial /HPF Urine Latest Ref Range: 0 - 1 /HPF  11 (H)   Mucous Urine Latest Ref Range: NEG^Negative /LPF  Present (A)     Contains abnormal data Yeast culture  Order: [id number]  Status:  Preliminary result   Visible to patient:  No (not released) Dx:  Candida infection  Specimen Information: tongue        Component 1d ago  "  Specimen Description Tongue    Special Requests Specimen collected in eSwab transport (white cap) P    Culture Micro Abnormal   Moderate growth   Candida albicans   isolated   P      Resulting Agency [email or web address]           Exam Date Exam Time Accession # Performing Department Results    4/8/21 10:38 AM [id number] [facility] X[facility] [place]    PACS Images     Show images for XR Cervical Spine 2-3 Views   Study Result    EXAM: XR CERVICAL SPINE 2/3 VWS  4/8/2021 10:38 AM     HISTORY: S/P C3-C5 fusion.  Now has \"catching\" and limited ROM; Neck  pain.     COMPARISON:  CT neck 4/4/2018.     FINDINGS: AP and lateral views of the cervical spine. Postsurgical  changes of C3-C5 ACDF. New fracture of right C3 vertebral body screw.  No acute osseous abnormality. No prevertebral soft tissue swelling.  Normal cervical lordosis is maintained. The lateral mass of C1 is well  aligned on C2. Grade 1 anterolisthesis C2 on C3 is unchanged. There is  disc height loss at C4-5. There are multilevel degenerative changes  with bridging osteophytes. Facet arthropathy. The visualized lung  apices are clear.                                                                      IMPRESSION:      1. Postoperative changes of C3-C5 ACDF with fracture of right C3  vertebral body screw.  2. Unchanged grade 1 anterolisthesis of C2 on C3.     I have personally reviewed the examination and initial interpretation  and I agree with the findings.     ZHAO VANG MD     Her C spine films done elsewhere 10/17/17 did not indicate screw fracture.  Undetermined whether this could be contrinutin to her neck discomfort. I will suggest a Sports Med consult.       Total time spent today with this patient including chart review, exam time with patient and documentation : 40 minutes.      Neena MAHARAJ CNP                  "

## 2021-04-12 LAB
Lab: ABNORMAL
SPECIMEN SOURCE: ABNORMAL
YEAST SPEC QL CULT: ABNORMAL

## 2021-04-12 RX ORDER — CYCLOBENZAPRINE HCL 10 MG
TABLET ORAL
Qty: 90 TABLET | Refills: 0 | Status: SHIPPED | OUTPATIENT
Start: 2021-04-12 | End: 2021-07-14

## 2021-04-12 NOTE — TELEPHONE ENCOUNTER
CYCLOBENZAPRINE 10 MG TABLET    Last Written Prescription Date:  8/22/2019  Last Fill Quantity: 30,   # refills: 0  Last Office Visit : 4/8/2021  Future Office visit:   None    Routing refill request to provider for review/approval because:  Clarification of orders.   Is it 3 times a day?   Or is it 1 Tab at bedtime?       Drug not on the [facility] or [facility] refill protocol or controlled substance      Lucie Ambriz RN  Central Triage Red Flags/Med Refills

## 2021-04-13 ENCOUNTER — ANCILLARY PROCEDURE (OUTPATIENT)
Dept: GENERAL RADIOLOGY | Facility: CLINIC | Age: 51
End: 2021-04-13
Attending: PHYSICIAN ASSISTANT
Payer: COMMERCIAL

## 2021-04-13 ENCOUNTER — IMMUNIZATION (OUTPATIENT)
Dept: NURSING | Facility: CLINIC | Age: 51
End: 2021-04-13
Payer: COMMERCIAL

## 2021-04-13 ENCOUNTER — OFFICE VISIT (OUTPATIENT)
Dept: NEUROSURGERY | Facility: CLINIC | Age: 51
End: 2021-04-13
Attending: NURSE PRACTITIONER
Payer: COMMERCIAL

## 2021-04-13 VITALS
HEIGHT: 64 IN | BODY MASS INDEX: 38.41 KG/M2 | OXYGEN SATURATION: 97 % | TEMPERATURE: 98.5 F | DIASTOLIC BLOOD PRESSURE: 75 MMHG | WEIGHT: 225 LBS | HEART RATE: 79 BPM | SYSTOLIC BLOOD PRESSURE: 128 MMHG

## 2021-04-13 DIAGNOSIS — M54.2 NECK PAIN: ICD-10-CM

## 2021-04-13 DIAGNOSIS — M54.2 NECK PAIN: Primary | ICD-10-CM

## 2021-04-13 DIAGNOSIS — Z11.59 ENCOUNTER FOR SCREENING FOR OTHER VIRAL DISEASES: ICD-10-CM

## 2021-04-13 PROCEDURE — G0463 HOSPITAL OUTPT CLINIC VISIT: HCPCS

## 2021-04-13 PROCEDURE — 99203 OFFICE O/P NEW LOW 30 MIN: CPT | Performed by: PHYSICIAN ASSISTANT

## 2021-04-13 PROCEDURE — 72040 X-RAY EXAM NECK SPINE 2-3 VW: CPT | Performed by: RADIOLOGY

## 2021-04-13 PROCEDURE — 0001A PR COVID VAC PFIZER DIL RECON 30 MCG/0.3 ML IM: CPT

## 2021-04-13 PROCEDURE — 91300 PR COVID VAC PFIZER DIL RECON 30 MCG/0.3 ML IM: CPT

## 2021-04-13 RX ORDER — BENZALKONIUM CHLORIDE 1.3 MG/ML
CLOTH TOPICAL
COMMUNITY

## 2021-04-13 ASSESSMENT — MIFFLIN-ST. JEOR: SCORE: 1621.62

## 2021-04-13 ASSESSMENT — PAIN SCALES - GENERAL: PAINLEVEL: MODERATE PAIN (5)

## 2021-04-13 NOTE — PROGRESS NOTES
"neuroNEUROSURGERY CLINIC CONSULT NOTE     DATE OF VISIT: 4/13/2021     SUBJECTIVE:     Zayra Ramirez is a pleasant 51 year old female who presents to the clinic today for consultation on cervical spine pain with cervical crepitus. She is referred to the Neurosurgery Clinic by Dr. Tam in Primary Care. Pertinent medical history consists of a C3-5 ACDF performed in 2017 by Dr. Monique.     Today, she reports a one-month history of symptoms. She describes a constant, with fluctuating intensities, sharp pain with \"popping\" noises that initiates in the left cervical region and radiates distally to her hand in no specific distribution but affects all fingers. Interestingly, she describes this left arm radiculopathy as \"refreshing\" as if it is creating \"blood flow\". This pain is not accompanied by paresthesia, numbness or perceived weakness in the same distribution. Cervical rotation seems to aggravate the symptoms, but she cannot appreciate any alleviating symptoms. No mechanism of injury such as trauma or a fall is associated with the onset of the pain. She denies changes in gait, instability, or falling episodes. There has been no significant change in her handwriting or hand dexterity. She did have cervical spine x-rays obtained which do show a fractured right sided C3 screw.      She has been utilizing contrast baths and NSAIDs with minimal relief.     Throughout this visit her  continues to suggest that they are not interested in pursing any treatment in [place], despite being in a [place] clinic. They would prefer to obtain treatment at [facility] with her previous surgeon, Dr. Monique, or at the [facility].     Ms. Ramirez smokes one pack per day.       Current Outpatient Medications:      albuterol (PROAIR HFA, PROVENTIL HFA, VENTOLIN HFA) 108 (90 BASE) MCG/ACT inhaler, Inhale 2 puffs into the lungs every 6 hours as needed for shortness of breath / dyspnea or wheezing (PT last dose " 1.23.2020) , Disp: , Rfl:      ARIPiprazole (ABILIFY) 15 MG tablet, Take 15 mg by mouth At Bedtime , Disp: , Rfl:      busPIRone HCl (BUSPAR) 30 MG tablet, Take 15 mg by mouth At Bedtime, Disp: , Rfl:      cholecalciferol ( ULTRA STRENGTH) 2000 units CAPS, TAKE ONE CAPSULE BY MOUTH DAILY IN AM, Disp: , Rfl:      cyclobenzaprine (FLEXERIL) 10 MG tablet, TAKE 1 TABLET BY MOUTH EVERYDAY AT BEDTIME, Disp: 90 tablet, Rfl: 0     DULoxetine (CYMBALTA) 60 MG capsule, Take 120 mg by mouth At Bedtime , Disp: , Rfl:      EPINEPHrine (EPIPEN/ADRENACLICK/OR ANY BX GENERIC EQUIV) 0.3 MG/0.3ML injection 2-pack, INJECT 0.3ML INTO THE MUSCLE ONCE AS NEEDED FOR ANAPHYLAXIS, Disp: , Rfl:      ferrous sulfate (FEROSUL) 325 (65 Fe) MG tablet, Take 325 mg by mouth daily (with breakfast) , Disp: , Rfl: 0     fluconazole (DIFLUCAN) 200 MG tablet, TAKE 1 TABLET BY MOUTH EVERY DAY FOR 14 DAYS, Disp: 14 tablet, Rfl: 0     fluticasone-salmeterol (ADVAIR) 500-50 MCG/DOSE diskus inhaler, Inhale 1 puff into the lungs 2 times daily , Disp: , Rfl:      folic acid (FOLVITE) 1 MG tablet, Take 1 tablet (1 mg) by mouth daily, Disp: 90 tablet, Rfl: 3     lisinopril-hydrochlorothiazide (ZESTORETIC) 20-25 MG tablet, Take 1 tablet by mouth daily, Disp: 90 tablet, Rfl: 1     methotrexate sodium 2.5 MG TABS, TAKE 9 TABLETS BY MOUTH ONCE WEEKLY  Labs  past due. appt past due., Disp: 108 tablet, Rfl: 5     montelukast (SINGULAIR) 10 MG tablet, Take 10 mg by mouth At Bedtime, Disp: , Rfl:      nystatin (MYCOSTATIN) 865764 UNIT/GM external cream, Apply topically 2 times daily, Disp: 30 g, Rfl: 3     omeprazole (PRILOSEC) 40 MG DR capsule, Take 40 mg by mouth as needed (PT last dose appr 2 weeks ago), Disp: , Rfl:      OXYGEN-HELIUM IN, 2-3L PRN during the day, 3L @ night, Disp: , Rfl:      pregabalin (LYRICA) 150 MG capsule, Take 1 capsule (150 mg) by mouth 2 times daily, Disp: 60 capsule, Rfl: 3     Respiratory Therapy Supplies ([facility] CPAP  FILTER) MISC, , Disp: , Rfl:      rOPINIRole (REQUIP) 0.5 MG tablet, Take 1 tablet by mouth At Bedtime Take 1 tab by mouth at bedtime., Disp: 60 tablet, Rfl: 0     traMADol (ULTRAM) 50 MG tablet, TAKE 1   2 TABLETS BY MOUTH EVERY 8 HOURS AS NEEDED FOR MODERATE TO SEVERE PAIN, Disp: , Rfl:      vitamin C 500 MG TABS, Take 500 mg by mouth daily (with lunch) , Disp: , Rfl:      zinc sulfate (ZINCATE) 220 (50 Zn) MG capsule, Take 220 mg by mouth daily (with lunch) , Disp: , Rfl:     Current Facility-Administered Medications:      ropivacaine (NAROPIN) injection 1 mL, 1 mL, , , Gael Dumont MD, 1 mL at 03/29/21 1424     triamcinolone (KENALOG-40) injection 40 mg, 40 mg, , , Gael Dumont MD, 40 mg at 03/29/21 1424    Facility-Administered Medications Ordered in Other Visits:      Lidocaine 1 % injection 0.5-5 mL, 0.5-5 mL, Other, Once PRN, Gutierrez Candelario MD     sodium chloride (PF) 0.9% PF flush 5-50 mL, 5-50 mL, Intracatheter, Once PRN, Gutierrez Candelario MD     Allergies   Allergen Reactions     Bee Venom Anaphylaxis     Bees      Doxycycline Anaphylaxis     Patient thinks it may have been just nausea and vomiting, however unable to confirm     Erythromycin Anaphylaxis and Shortness Of Breath     Other reaction(s): Vomiting     Hydrocodone-Acetaminophen Itching        Past Medical History:   Diagnosis Date     Allergic rhinitis      Anemia      Arthritis      Asthma     copd     Dental abscess 8-2015     Depressive disorder      Gastroesophageal reflux disease      History of emphysema      Hoarseness      Hypertension      Morbid obesity with BMI of 40.0-44.9, adult (H)      Obstructive sleep apnea      Other chronic pain      Respiratory bronchiolitis interstitial lung disease (H)      Sleep apnea         ROS: 10 point review of symptoms are negative other than the symptoms noted above in the HPI.     Family History has been reviewed with the patient, there are no pertinent findings to presenting  concern.     Past Surgical History:   Procedure Laterality Date     CHOLECYSTECTOMY       COLONOSCOPY       COLONOSCOPY N/A 2/6/2020    Procedure: COLONOSCOPY, WITH POLYPECTOMY AND BIOPSY;  Surgeon: Julian Mccullough MD;  Location:  GI     ENT SURGERY       ESOPHAGOSCOPY, GASTROSCOPY, DUODENOSCOPY (EGD), COMBINED N/A 2/6/2020    Procedure: ESOPHAGOGASTRODUODENOSCOPY (EGD);  Surgeon: Julian Mccullough MD;  Location:  GI     EXCISE LESION INTRAORAL Bilateral 10/3/2018    Procedure: EXCISE LESION INTRAORAL;  Wide Local Excision Of of Left Oral Cavity Ulcer;  Surgeon: Morro Mijares MD;  Location: [facility] OR     HC DRAIN SKIN ABSCESS SIMPLE/SINGLE  3/16/2012    Procedure:INCISION AND DRAINAGE, ABSCESS, SIMPLE; Surgeon:CHRISTIANO HANCOCK; Location: GI     HEAD & NECK SURGERY       HYSTERECTOMY       INCISION AND DRAINAGE ABDOMEN WASHOUT, COMBINED       INJECT EPIDURAL LUMBAR Right 9/15/2020    Procedure: Lumbar5- sacral 1 epidural steroid injection with fluoroscopy;  Surgeon: Tram Florian MD;  Location: [facility] OR     INJECT SACROILIAC JOINT Bilateral 6/16/2020    Procedure: Bilateral sacroiliac joint steroid injection with fluoroscopy;  Surgeon: Tram Florian MD;  Location: [facility] OR     LAMINECTOMY THORACIC ONE LEVEL N/A 8/19/2019    Procedure: LAMINECTOMY, SPINE, THORACIC, 11-12 and Part of Lumbar 1, DRAINAGE OF EPIDURAL ABCESS, Epidural Drain Placement X 2;  Surgeon: Yadiel Beal MD;  Location: [facility] OR     RADIO FREQUENCY ABLATION / DESTRUCTION OF SACROILOAC JOINT DORSAL PRIMARY RAMUS Bilateral 12/17/2019    Procedure: Bilateral lumbar radiofrequency ablation with fluoroscopy and intravenous sedation ( Lumbar 2,3,4,5 medial branch nerves for the bilateral lumbar3-4, 4-5 and 5-sacral1 joints.;  Surgeon: Tram Florian MD;  Location: [facility]     RADIO FREQUENCY ABLATION / DESTRUCTION OF SACROILOAC JOINT DORSAL PRIMARY RAMUS Right 11/17/2020    Procedure: Right lumbar medial branch nerve  "radiofrequency ablation right L2,3,4,5 nerves supplying the right L3-4, L4-5 and L5-S1 facet joints;  Surgeon: Tram Florian MD;  Location: [facility] OR     spinal cord stimulator  08/08/2019     spinal cord stimulator removal  08/13/2019        Social History     Tobacco Use     Smoking status: Current Every Day Smoker     Packs/day: 1.00     Years: 30.00     Pack years: 30.00     Types: Cigarettes     Start date: 1/1/1996     Smokeless tobacco: Never Used     Tobacco comment: has tried the patch   Substance Use Topics     Alcohol use: No     Binge frequency: Monthly     Drug use: Yes     Types: Marijuana     Comment: very rarely         OBJECTIVE:   /75   Pulse 79   Temp 98.5  F (36.9  C)   Ht 5' 3.75\" (1.619 m)   Wt 225 lb (102.1 kg)   LMP  (LMP Unknown)   SpO2 97%   BMI 38.92 kg/m     Body mass index is 38.92 kg/m .     Imaging:     XR CERVICAL SPINE 2/3 VWS - 4/8/2021 10:38 AM    Findings, per radiologist read, notable for:      1. Postoperative changes of C3-C5 ACDF with fracture of right C3  vertebral body screw.  2. Unchanged grade 1 anterolisthesis of C2 on C3.    Full radiological report in chart. Imaging was reviewed with with patient today.     Exam:     Patient appears comfortable, conversational, and in no apparent distress.   Head: Normocephalic, without obvious abnormality, atraumatic, no facial asymmetry.   Eyes: conjunctivae/corneas clear. PERRL, EOM's intact.   Throat: lips, mucosa, and tongue normal; teeth and gums normal.   Neck: supple, symmetrical, trachea midline, no adenopathy and thyroid: not enlarged, symmetric, no tenderness/mass/nodules.   Lungs: clear to auscultation bilaterally. Crackles presumably from smoking.   Heart: regular rate and rhythm.   Abdomen: soft, non-tender; bowel sounds normal; no masses, no organomegaly.   Pulses: 2+ and symmetric.   Skin: Skin color, texture, turgor normal. No rashes or lesions.     CN II-XII grossly intact, alert and appropriate with " "conversation and following commands.   Gait is non-antalgic. Able to tandem walk. Able to walk on toes and heels without difficulty.   Cervical spine is non tender to palpation. Diminished range of motion of neck, not concerning for lhermitte's phenomenon, but clear audible crepitus is obvious with range of motion.  Bilateral bicep 1/4 and tricep reflexes 1/4. Sensation intact throughout upper extremities.     UE muscle strength  Right  Left    Deltoid  5/5  5/5    Biceps  5/5  5/5    Triceps  5/5  5/5    Hand intrinsics  5/5  5/5    Hand grasp  5/5  5/5    Leon signs  neg  POS      Lumbar spine is non tender to palpation.  Intact sensation throughout lower extremities.   Bilateral patellar 2/4 and achilles reflex 1/4. Negative for pain with straight leg raise.     LE muscle strength  Right  Left    Iliopsoas (hip flexion)  5/5  5/5    Quad (knee extension)  5/5  5/5    Hamstring (knee flexion)  5/5  5/5    Gastrocnemius (PF)  5/5  5/5    Tibialis Ant. (DF)  5/5  5/5    EHL  5/5  5/5      Negative Babinski bilaterally. Negative for clonus.   Calves are soft and non-tender bilaterally.     ASSESSMENT/PLAN:     Zayra Ramirez is a 51 year old, one pack a day smoker, female who has a surgical history of a C3-5 ACDF performed by Dr. Monique of [facility]. This was performed in 2017. Today, she presents to the clinic for consultation on a one month history of a constant, with fluctuating intensities, sharp cervical pain with clearly audible crepitus of her cervical spine. Her symptoms seem to be all left sided and radiate distally to her hand in no specific distribution, but affects all fingers. Interestingly, she describes the left arm and hand radiculopathy as \"refreshing\" as if it is creating \"blood flow\". This pain is not accompanied by paresthesia, numbness or perceived weakness.     The patient's most recent imaging was reviewed with her today. It was explained that her images show postoperative changes consisting " of a C3-C5 ACDF with a fractured right C3 vertebral body screw. On exam, she is noted to have appropriate strength and  sensation, but diminished cervical range of motion and audible crepitation with range of motion. She has attempted conservative management with NSAID's and contrast baths, without resolution of symptoms.     Based on her physical exam, imaging review and past treatments, we feel that it would be in her best interest to obtain updated cervical spine x-rays to include flexion and extension views in conjunction with a cervical spine CT. Since her  has made it clear that they are not interested in pursing any intervention here in [place], we have placed a referral to our colleagues in the Neurosurgery Department at the [facility] location to further discuss possible surgical interventions or other conservative therapies. We also discussed signs of a worsening problem that she should seek being evaluated.        Respectfully,     MICHELLE Vásquez, PAARIES  [facility] Neurosurgery  [facility]  Tel: [phone]      Exam, imaging, and plan reviewed by Dr. Benson.

## 2021-04-13 NOTE — NURSING NOTE
"Zayra Ramirez is a 51 year old female who presents for:  Chief Complaint   Patient presents with     Consult     Neck stiffness and cracking with ROM. S/P C3-5 fusion with new Ce screw fracture since 10/17/17 seen on xray 4/8/21        Initial Vitals:  /75   Pulse 79   Temp 98.5  F (36.9  C)   Ht 5' 3.75\" (1.619 m)   Wt 225 lb (102.1 kg)   LMP  (LMP Unknown)   SpO2 97%   BMI 38.92 kg/m   Estimated body mass index is 38.92 kg/m  as calculated from the following:    Height as of this encounter: 5' 3.75\" (1.619 m).    Weight as of this encounter: 225 lb (102.1 kg).. Body surface area is 2.14 meters squared. BP completed using cuff size: regular right forearm  Moderate Pain (5)    Nursing Comments: Patient presents for Neck stiffness and cracking with ROM. S/P C3-5 fusion with new Ce screw fracture since 10/17/17 seen on xray 4/8/21    Tim Bennett MA  "

## 2021-04-13 NOTE — PATIENT INSTRUCTIONS
-Order placed for cervical CT WO. Please call [facility] at [phone] to schedule the date, time and location that is most convenient for you to obtain your imaging. Once the imaging has been completed, please contact my office the next day to review the images as well as treatment options. You can contact my off at [phone].      MICHELLE Vásquez  [facility] Neurosurgery  [facility]     Tel: [phone]  Fax: [phone]

## 2021-04-13 NOTE — LETTER
"    4/13/2021         RE: Zayra Ramirez  [address] [place] [address]        Dear Colleague,    Thank you for referring your patient, Zayra Ramirez, to the [facility] [place]. Please see a copy of my visit note below.    neuroNEUROSURGERY CLINIC CONSULT NOTE     DATE OF VISIT: 4/13/2021     SUBJECTIVE:     Zayra Ramirez is a pleasant 51 year old female who presents to the clinic today for consultation on cervical spine pain with cervical crepitus. She is referred to the Neurosurgery Clinic by Dr. Tam in Primary Care. Pertinent medical history consists of a C3-5 ACDF performed in 2017 by Dr. Monique.     Today, she reports a one-month history of symptoms. She describes a constant, with fluctuating intensities, sharp pain with \"popping\" noises that initiates in the left cervical region and radiates distally to her hand in no specific distribution but affects all fingers. Interestingly, she describes this left arm radiculopathy as \"refreshing\" as if it is creating \"blood flow\". This pain is not accompanied by paresthesia, numbness or perceived weakness in the same distribution. Cervical rotation seems to aggravate the symptoms, but she cannot appreciate any alleviating symptoms. No mechanism of injury such as trauma or a fall is associated with the onset of the pain. She denies changes in gait, instability, or falling episodes. There has been no significant change in her handwriting or hand dexterity. She did have cervical spine x-rays obtained which do show a fractured right sided C3 screw.      She has been utilizing contrast baths and NSAIDs with minimal relief.     Throughout this visit her  continues to suggest that they are not interested in pursing any treatment in [place], despite being in a [place] clinic. They would prefer to obtain treatment at [facility] with her previous surgeon, Dr. Monique, or at the [facility]. "     Ms. Ramirez smokes one pack per day.       Current Outpatient Medications:      albuterol (PROAIR HFA, PROVENTIL HFA, VENTOLIN HFA) 108 (90 BASE) MCG/ACT inhaler, Inhale 2 puffs into the lungs every 6 hours as needed for shortness of breath / dyspnea or wheezing (PT last dose 1.23.2020) , Disp: , Rfl:      ARIPiprazole (ABILIFY) 15 MG tablet, Take 15 mg by mouth At Bedtime , Disp: , Rfl:      busPIRone HCl (BUSPAR) 30 MG tablet, Take 15 mg by mouth At Bedtime, Disp: , Rfl:      cholecalciferol ( ULTRA STRENGTH) 2000 units CAPS, TAKE ONE CAPSULE BY MOUTH DAILY IN AM, Disp: , Rfl:      cyclobenzaprine (FLEXERIL) 10 MG tablet, TAKE 1 TABLET BY MOUTH EVERYDAY AT BEDTIME, Disp: 90 tablet, Rfl: 0     DULoxetine (CYMBALTA) 60 MG capsule, Take 120 mg by mouth At Bedtime , Disp: , Rfl:      EPINEPHrine (EPIPEN/ADRENACLICK/OR ANY BX GENERIC EQUIV) 0.3 MG/0.3ML injection 2-pack, INJECT 0.3ML INTO THE MUSCLE ONCE AS NEEDED FOR ANAPHYLAXIS, Disp: , Rfl:      ferrous sulfate (FEROSUL) 325 (65 Fe) MG tablet, Take 325 mg by mouth daily (with breakfast) , Disp: , Rfl: 0     fluconazole (DIFLUCAN) 200 MG tablet, TAKE 1 TABLET BY MOUTH EVERY DAY FOR 14 DAYS, Disp: 14 tablet, Rfl: 0     fluticasone-salmeterol (ADVAIR) 500-50 MCG/DOSE diskus inhaler, Inhale 1 puff into the lungs 2 times daily , Disp: , Rfl:      folic acid (FOLVITE) 1 MG tablet, Take 1 tablet (1 mg) by mouth daily, Disp: 90 tablet, Rfl: 3     lisinopril-hydrochlorothiazide (ZESTORETIC) 20-25 MG tablet, Take 1 tablet by mouth daily, Disp: 90 tablet, Rfl: 1     methotrexate sodium 2.5 MG TABS, TAKE 9 TABLETS BY MOUTH ONCE WEEKLY  Labs  past due. appt past due., Disp: 108 tablet, Rfl: 5     montelukast (SINGULAIR) 10 MG tablet, Take 10 mg by mouth At Bedtime, Disp: , Rfl:      nystatin (MYCOSTATIN) 778683 UNIT/GM external cream, Apply topically 2 times daily, Disp: 30 g, Rfl: 3     omeprazole (PRILOSEC) 40 MG DR capsule, Take 40 mg by mouth as needed (PT last dose  appr 2 weeks ago), Disp: , Rfl:      OXYGEN-HELIUM IN, 2-3L PRN during the day, 3L @ night, Disp: , Rfl:      pregabalin (LYRICA) 150 MG capsule, Take 1 capsule (150 mg) by mouth 2 times daily, Disp: 60 capsule, Rfl: 3     Respiratory Therapy Supplies (CARETOUCH UNIVERSL CPAP FILTER) MISC, , Disp: , Rfl:      rOPINIRole (REQUIP) 0.5 MG tablet, Take 1 tablet by mouth At Bedtime Take 1 tab by mouth at bedtime., Disp: 60 tablet, Rfl: 0     traMADol (ULTRAM) 50 MG tablet, TAKE 1   2 TABLETS BY MOUTH EVERY 8 HOURS AS NEEDED FOR MODERATE TO SEVERE PAIN, Disp: , Rfl:      vitamin C 500 MG TABS, Take 500 mg by mouth daily (with lunch) , Disp: , Rfl:      zinc sulfate (ZINCATE) 220 (50 Zn) MG capsule, Take 220 mg by mouth daily (with lunch) , Disp: , Rfl:     Current Facility-Administered Medications:      ropivacaine (NAROPIN) injection 1 mL, 1 mL, , , Gael Dumont MD, 1 mL at 03/29/21 1424     triamcinolone (KENALOG-40) injection 40 mg, 40 mg, , , Gael Dumont MD, 40 mg at 03/29/21 1424    Facility-Administered Medications Ordered in Other Visits:      Lidocaine 1 % injection 0.5-5 mL, 0.5-5 mL, Other, Once PRN, Gutierrez Candelario MD     sodium chloride (PF) 0.9% PF flush 5-50 mL, 5-50 mL, Intracatheter, Once PRN, Gutierrez Candelario MD     Allergies   Allergen Reactions     Bee Venom Anaphylaxis     Bees      Doxycycline Anaphylaxis     Patient thinks it may have been just nausea and vomiting, however unable to confirm     Erythromycin Anaphylaxis and Shortness Of Breath     Other reaction(s): Vomiting     Hydrocodone-Acetaminophen Itching        Past Medical History:   Diagnosis Date     Allergic rhinitis      Anemia      Arthritis      Asthma     copd     Dental abscess 8-2015     Depressive disorder      Gastroesophageal reflux disease      History of emphysema      Hoarseness      Hypertension      Morbid obesity with BMI of 40.0-44.9, adult (H)      Obstructive sleep apnea      Other chronic pain       Respiratory bronchiolitis interstitial lung disease (H)      Sleep apnea         ROS: 10 point review of symptoms are negative other than the symptoms noted above in the HPI.     Family History has been reviewed with the patient, there are no pertinent findings to presenting concern.     Past Surgical History:   Procedure Laterality Date     CHOLECYSTECTOMY       COLONOSCOPY       COLONOSCOPY N/A 2/6/2020    Procedure: COLONOSCOPY, WITH POLYPECTOMY AND BIOPSY;  Surgeon: Julian Mccullough MD;  Location:  GI     ENT SURGERY       ESOPHAGOSCOPY, GASTROSCOPY, DUODENOSCOPY (EGD), COMBINED N/A 2/6/2020    Procedure: ESOPHAGOGASTRODUODENOSCOPY (EGD);  Surgeon: Julian Mccullough MD;  Location:  GI     EXCISE LESION INTRAORAL Bilateral 10/3/2018    Procedure: EXCISE LESION INTRAORAL;  Wide Local Excision Of of Left Oral Cavity Ulcer;  Surgeon: Morro Mijares MD;  Location: [facility] OR     HC DRAIN SKIN ABSCESS SIMPLE/SINGLE  3/16/2012    Procedure:INCISION AND DRAINAGE, ABSCESS, SIMPLE; Surgeon:CHRISTIANO HANCOCK; Location: GI     HEAD & NECK SURGERY       HYSTERECTOMY       INCISION AND DRAINAGE ABDOMEN WASHOUT, COMBINED       INJECT EPIDURAL LUMBAR Right 9/15/2020    Procedure: Lumbar5- sacral 1 epidural steroid injection with fluoroscopy;  Surgeon: Tram Florian MD;  Location: [facility] OR     INJECT SACROILIAC JOINT Bilateral 6/16/2020    Procedure: Bilateral sacroiliac joint steroid injection with fluoroscopy;  Surgeon: Tram Florian MD;  Location: [facility] OR     LAMINECTOMY THORACIC ONE LEVEL N/A 8/19/2019    Procedure: LAMINECTOMY, SPINE, THORACIC, 11-12 and Part of Lumbar 1, DRAINAGE OF EPIDURAL ABCESS, Epidural Drain Placement X 2;  Surgeon: Yadiel Beal MD;  Location: [facility] OR     RADIO FREQUENCY ABLATION / DESTRUCTION OF SACROILOAC JOINT DORSAL PRIMARY RAMUS Bilateral 12/17/2019    Procedure: Bilateral lumbar radiofrequency ablation with fluoroscopy and intravenous sedation ( Lumbar 2,3,4,5  "medial branch nerves for the bilateral lumbar3-4, 4-5 and 5-sacral1 joints.;  Surgeon: Tram Florian MD;  Location: [facility] OR     RADIO FREQUENCY ABLATION / DESTRUCTION OF SACROILOAC JOINT DORSAL PRIMARY RAMUS Right 11/17/2020    Procedure: Right lumbar medial branch nerve radiofrequency ablation right L2,3,4,5 nerves supplying the right L3-4, L4-5 and L5-S1 facet joints;  Surgeon: Tram Florian MD;  [place] [facility] [place]     spinal cord stimulator  08/08/2019     spinal cord stimulator removal  08/13/2019        Social History     Tobacco Use     Smoking status: Current Every Day Smoker     Packs/day: 1.00     Years: 30.00     Pack years: 30.00     Types: Cigarettes     Start date: 1/1/1996     Smokeless tobacco: Never Used     Tobacco comment: has tried the patch   Substance Use Topics     Alcohol use: No     Binge frequency: Monthly     Drug use: Yes     Types: Marijuana     Comment: very rarely         OBJECTIVE:   /75   Pulse 79   Temp 98.5  F (36.9  C)   Ht 5' 3.75\" (1.619 m)   Wt 225 lb (102.1 kg)   LMP  (LMP Unknown)   SpO2 97%   BMI 38.92 kg/m     Body mass index is 38.92 kg/m .     Imaging:     XR CERVICAL SPINE 2/3 VWS - 4/8/2021 10:38 AM    Findings, per radiologist read, notable for:      1. Postoperative changes of C3-C5 ACDF with fracture of right C3  vertebral body screw.  2. Unchanged grade 1 anterolisthesis of C2 on C3.    Full radiological report in chart. Imaging was reviewed with with patient today.     Exam:     Patient appears comfortable, conversational, and in no apparent distress.   Head: Normocephalic, without obvious abnormality, atraumatic, no facial asymmetry.   Eyes: conjunctivae/corneas clear. PERRL, EOM's intact.   Throat: lips, mucosa, and tongue normal; teeth and gums normal.   Neck: supple, symmetrical, trachea midline, no adenopathy and thyroid: not enlarged, symmetric, no tenderness/mass/nodules.   Lungs: clear to auscultation bilaterally. Crackles presumably from " smoking.   Heart: regular rate and rhythm.   Abdomen: soft, non-tender; bowel sounds normal; no masses, no organomegaly.   Pulses: 2+ and symmetric.   Skin: Skin color, texture, turgor normal. No rashes or lesions.     CN II-XII grossly intact, alert and appropriate with conversation and following commands.   Gait is non-antalgic. Able to tandem walk. Able to walk on toes and heels without difficulty.   Cervical spine is non tender to palpation. Diminished range of motion of neck, not concerning for lhermitte's phenomenon, but clear audible crepitus is obvious with range of motion.  Bilateral bicep 1/4 and tricep reflexes 1/4. Sensation intact throughout upper extremities.     UE muscle strength  Right  Left    Deltoid  5/5  5/5    Biceps  5/5  5/5    Triceps  5/5  5/5    Hand intrinsics  5/5  5/5    Hand grasp  5/5  5/5    Leon signs  neg  POS      Lumbar spine is non tender to palpation.  Intact sensation throughout lower extremities.   Bilateral patellar 2/4 and achilles reflex 1/4. Negative for pain with straight leg raise.     LE muscle strength  Right  Left    Iliopsoas (hip flexion)  5/5  5/5    Quad (knee extension)  5/5  5/5    Hamstring (knee flexion)  5/5  5/5    Gastrocnemius (PF)  5/5  5/5    Tibialis Ant. (DF)  5/5  5/5    EHL  5/5  5/5      Negative Babinski bilaterally. Negative for clonus.   Calves are soft and non-tender bilaterally.     ASSESSMENT/PLAN:     Zayra Ramirez is a 51 year old, one pack a day smoker, female who has a surgical history of a C3-5 ACDF performed by Dr. Monique of [facility]. This was performed in 2017. Today, she presents to the clinic for consultation on a one month history of a constant, with fluctuating intensities, sharp cervical pain with clearly audible crepitus of her cervical spine. Her symptoms seem to be all left sided and radiate distally to her hand in no specific distribution, but affects all fingers. Interestingly, she describes the left arm and hand  "radiculopathy as \"refreshing\" as if it is creating \"blood flow\". This pain is not accompanied by paresthesia, numbness or perceived weakness.     The patient's most recent imaging was reviewed with her today. It was explained that her images show postoperative changes consisting of a C3-C5 ACDF with a fractured right C3 vertebral body screw. On exam, she is noted to have appropriate strength and  sensation, but diminished cervical range of motion and audible crepitation with range of motion. She has attempted conservative management with NSAID's and contrast baths, without resolution of symptoms.     Based on her physical exam, imaging review and past treatments, we feel that it would be in her best interest to obtain updated cervical spine x-rays to include flexion and extension views in conjunction with a cervical spine CT. Since her  has made it clear that they are not interested in pursing any intervention here in [place], we have placed a referral to our colleagues in the Neurosurgery Department at the [facility] location to further discuss possible surgical interventions or other conservative therapies. We also discussed signs of a worsening problem that she should seek being evaluated.        Respectfully,     MICHELLE Vásquez, BRE  [facility] Neurosurgery  [facility]  Tel: [phone]      Exam, imaging, and plan reviewed by Dr. Benson.       Again, thank you for allowing me to participate in the care of your patient.        Sincerely,        Deni Orellana PA-C    "

## 2021-04-13 NOTE — TELEPHONE ENCOUNTER
I called and left message with result note of XR cervical spine from [facility]. Patient will be following up with ortho regarding changes.  Emely Santos, EMT at 12:07 PM on 4/13/2021.

## 2021-04-16 ENCOUNTER — TELEPHONE (OUTPATIENT)
Dept: NEUROSURGERY | Facility: CLINIC | Age: 51
End: 2021-04-16

## 2021-04-19 ENCOUNTER — TELEPHONE (OUTPATIENT)
Dept: ANESTHESIOLOGY | Facility: CLINIC | Age: 51
End: 2021-04-19

## 2021-04-19 ENCOUNTER — TELEPHONE (OUTPATIENT)
Dept: NEUROSURGERY | Facility: CLINIC | Age: 51
End: 2021-04-19

## 2021-04-19 DIAGNOSIS — G25.81 RESTLESS LEG SYNDROME: ICD-10-CM

## 2021-04-19 RX ORDER — ROPINIROLE 0.5 MG/1
0.5 TABLET, FILM COATED ORAL AT BEDTIME
Qty: 90 TABLET | Refills: 1 | Status: SHIPPED | OUTPATIENT
Start: 2021-04-19 | End: 2021-10-18

## 2021-04-19 NOTE — TELEPHONE ENCOUNTER
Zayra called to cancel her procedure that was scheduled for 4/27/21. Zayra has to take care of other medical needs at this time, and will call back when she is ready to reschedule.    Thank you,    Cassy MCCLAIN  Perioperative

## 2021-04-20 NOTE — TELEPHONE ENCOUNTER
SPINE PATIENTS - NEW PROTOCOL PREVISIT    RECORDS RECEIVED FROM: Internal   REASON FOR VISIT: Neck pain   Date of Appt: 4/30/21   NOTES (FOR ALL VISITS) STATUS DETAILS   OFFICE NOTE from referring provider Internal Deni MARIE @ [facility] Neurosurgery [place]:  4/13/21   OFFICE NOTE from other specialist N/A    DISCHARGE SUMMARY from hospital N/A    DISCHARGE REPORT from ER N/A    EMG REPORT N/A    MEDICATION LIST Internal    IMAGING  (FOR ALL VISITS)     MRI (HEAD, NECK, SPINE) N/A    XRAY (SPINE) *NEUROSURGERY* Internal [facility]:  XR Cervical Spine 4/13/21    [facility]:  XR Cervical Spine 4/8/21   CT (HEAD, NECK, SPINE) Internal [facility]:  CT Cervical Spine 4/21/21

## 2021-04-21 ENCOUNTER — NURSE TRIAGE (OUTPATIENT)
Dept: NURSING | Facility: CLINIC | Age: 51
End: 2021-04-21

## 2021-04-21 ENCOUNTER — HOSPITAL ENCOUNTER (OUTPATIENT)
Dept: CT IMAGING | Facility: CLINIC | Age: 51
Discharge: HOME | End: 2021-04-21
Attending: PHYSICIAN ASSISTANT | Admitting: PHYSICIAN ASSISTANT
Payer: COMMERCIAL

## 2021-04-21 ENCOUNTER — TELEPHONE (OUTPATIENT)
Dept: INTERNAL MEDICINE | Facility: CLINIC | Age: 51
End: 2021-04-21

## 2021-04-21 DIAGNOSIS — M54.2 NECK PAIN: ICD-10-CM

## 2021-04-21 PROCEDURE — 72125 CT NECK SPINE W/O DYE: CPT

## 2021-04-21 NOTE — TELEPHONE ENCOUNTER
Pt received the Pfizer vaccinations on Tuesday April 13th, 2021.      Pt got sick right after she took the shot.   Pt was red and blotch all over.    Very sore,achy, congestive cough, sore throat,and could not get out of bed.  It took 3 days before she could move. Pt is doing better.    But still has a congestive cough, sore throat, very raspy when she talks and sounds really congested.      Pt wondering if she should get the second vaccination.     Also wondering if she needs to be seen in clinic for the sore throat and chest congestion.       Pl call Pt back @ [phone] for further assistance.      Lucie Ambriz RN  Central Triage Red Flags/Med Refills      Reason for Disposition    [1] Typical COVID-19 symptoms AND [2] started > 3 days after getting vaccine    [1] Continuous (nonstop) coughing interferes with work or school AND [2] no improvement using cough treatment per protocol    Additional Information    Negative: [1] Difficulty breathing or swallowing AND [2] starts within 2 hours after injection    Negative: Sounds like a life-threatening emergency to the triager    Negative: [1] COVID-19 exposure AND [2] no symptoms    Negative: [1] Typical COVID-19 symptoms AND [2] symptoms that are NOT expected from vaccine (e.g., cough, difficulty breathing, loss of taste or smell, runny nose, sore throat)    Negative: SEVERE difficulty breathing (e.g., struggling for each breath, speaks in single words)    Negative: Difficult to awaken or acting confused (e.g., disoriented, slurred speech)    Negative: Bluish (or gray) lips or face now    Negative: Shock suspected (e.g., cold/pale/clammy skin, too weak to stand, low BP, rapid pulse)    Negative: Sounds like a life-threatening emergency to the triager    Negative: [1] COVID-19 exposure AND [2] no symptoms    Negative: [1] Lives with someone known to have influenza (flu test positive) AND [2] flu-like symptoms (e.g., cough, runny nose, sore throat, SOB; with or  without fever)    Negative: [1] Adult with possible COVID-19 symptoms AND [2] triager concerned about severity of symptoms or other causes    Negative: COVID-19 vaccine reaction suspected (e.g., fever, headache, muscle aches) occurring during days 1-3 after getting vaccine    Negative: COVID-19 vaccine, questions about    Negative: COVID-19 and breastfeeding, questions about    Negative: SEVERE or constant chest pain or pressure (Exception: mild central chest pain, present only when coughing)    Negative: MODERATE difficulty breathing (e.g., speaks in phrases, SOB even at rest, pulse 100-120)    Negative: [1] Headache AND [2] stiff neck (can't touch chin to chest)    Negative: MILD difficulty breathing (e.g., minimal/no SOB at rest, SOB with walking, pulse <100)    Negative: Chest pain or pressure    Negative: Patient sounds very sick or weak to the triager    Negative: Fever > 103 F (39.4 C)    Negative: [1] Fever > 101 F (38.3 C) AND [2] age > 60    Negative: [1] Fever > 100.0 F (37.8 C) AND [2] bedridden (e.g., nursing home patient, CVA, chronic illness, recovering from surgery)    Negative: [1] HIGH RISK patient (e.g., age > 64 years, diabetes, heart or lung disease, weak immune system) AND [2] new or worsening symptoms    Negative: [1] HIGH RISK patient AND [2] influenza is widespread in the community AND [3] ONE OR MORE respiratory symptoms: cough, sore throat, runny or stuffy nose    Negative: [1] HIGH RISK patient AND [2] influenza exposure within the last 7 days AND [3] ONE OR MORE respiratory symptoms: cough, sore throat, runny or stuffy nose    Negative: Fever present > 3 days (72 hours)    Negative: [1] Fever returns after gone for over 24 hours AND [2] symptoms worse or not improved    Protocols used: CORONAVIRUS (COVID-19) VACCINE QUESTIONS AND [id number] 1.3, CORONAVIRUS (COVID-19) DIAGNOSED OR [id number]-RD 1.3

## 2021-04-21 NOTE — TELEPHONE ENCOUNTER
Spoke with pt.  She states that she has been having the sxs as mentioned below since first vaccine, but it is getting much better with mild sore and dry cough. No fever, breathing difficulty or chest pain/hurt.  I advised to have tylenol for comfort and plenty of fluids with good nutrition. She should have the second vaccine. She will call us or go to ED if worsens or having breathing difficulty.

## 2021-04-30 ENCOUNTER — PRE VISIT (OUTPATIENT)
Dept: NEUROSURGERY | Facility: CLINIC | Age: 51
End: 2021-04-30

## 2021-04-30 ENCOUNTER — VIRTUAL VISIT (OUTPATIENT)
Dept: NEUROSURGERY | Facility: CLINIC | Age: 51
End: 2021-04-30
Payer: COMMERCIAL

## 2021-04-30 DIAGNOSIS — T84.498A LOOSENING OF HARDWARE IN SPINE (H): ICD-10-CM

## 2021-04-30 DIAGNOSIS — Z98.1 S/P CERVICAL SPINAL FUSION: ICD-10-CM

## 2021-04-30 DIAGNOSIS — M54.2 NECK PAIN: Primary | ICD-10-CM

## 2021-04-30 PROCEDURE — 99204 OFFICE O/P NEW MOD 45 MIN: CPT | Mod: 95 | Performed by: NURSE PRACTITIONER

## 2021-04-30 NOTE — LETTER
4/30/2021       RE: Zayra Ramirez  [address] [place] [address]     Dear Colleague,    Thank you for referring your patient, Zayra Ramirez, to the [facility] NEUROSURGERY CLINIC [place] [facility]. Please see a copy of my visit note below.    Zayra is a 51 year old who is being evaluated via a billable video visit.      This is a video/telephone visit conducted due to [facility] restrictions on non-urgent clinic visits due to risk of the spread of COVID-19 pandemic virus. The patient did not identify any urgent or red-flag symptoms that would require in-person evaluation.    How would you like to obtain your AVS? mychart  If the video visit is dropped, the invitation should be resent by: send link to [phone]  Will anyone else be joining your video visit? no      Video-Visit Details    Type of service:  Video Visit    Video Start Time:         9:06 am     Video End Time              9:24 am     Originating Location (pt. Location):       Home     Distant Location (provider location):  [facility] NEUROSURGERY [facility]     Platform used for Video Visit: doximity    Reason for visit:      Neck pain -  Hx of prior surgery with fractured screw     History of present illness:   Zayra Ramirez is a very pleasant 51 year old female with past medical history of interstitial lung disease, COPD (Requiring O2), sleep apnea requiring C-Pap, reflux, hypertension, obesity, hx of lumbar surgery, spinal cord stimulator (Removed) and prior cervical spine fusion, who is seen at the request of CYNTHIA Cheung, for evaluation of her neck pain.   She underwent Cervical spine surgery with two level ACDF C3-C5 in 2017 with Dr. Rylan Monique.   She did quit smoking, however only for a short time, prior to this surgery.   She was doing very well following this surgery, until about 2 months ago, and then  "she started to notice her neck was making \"weird noises\"   No mechanism of injury such as trauma or a fall is associated with the onset of the pain.  She was seen by chiropractic for massage, no adjustments and underwent cervical spine xray imaging.   She then started to develop neck pain   She describes a constant, with fluctuating intensities, sharp pain with \"popping\" noises   that initiates in the left cervical region and   radiating pain to the left hand.   no specific digits, but affects all fingers.  No associated numbness/tingling, or upper extremity weakness  Right arm is asymptomatic.   She describes that Cervical rotation, to the left, seems to aggravate the symptoms, but she cannot appreciate any alleviating symptoms.    She denies changes in gait, instability, or falling episodes.      Review of systems: 10 point ROS negative except for as detailed in HPI  Past Medical History:   Past Medical History:   Diagnosis Date     Allergic rhinitis      Anemia      Arthritis      Asthma     Copd                      O2 at nighttime via C-PAP     Dental abscess 8-2015     Depressive disorder      Gastroesophageal reflux disease      History of emphysema      Hoarseness      Hypertension      Morbid obesity with BMI of 40.0-44.9, adult (H)      Respiratory bronchiolitis interstitial lung disease (H)      Sleep apnea      Surgical History:   Past Surgical History:   Procedure Laterality Date     CHOLECYSTECTOMY       ENT SURGERY       EXCISE LESION INTRAORAL Bilateral 10/3/2018    Procedure: EXCISE LESION INTRAORAL;  Wide Local Excision Of of Left Oral Cavity Ulcer;  Surgeon: Morro Mijares MD;  [facility]     HYSTERECTOMY       INCISION AND DRAINAGE ABDOMEN WASHOUT, COMBINED       INJECT EPIDURAL LUMBAR Right 9/15/2020    Procedure: Lumbar5- sacral 1 epidural steroid injection with fluoroscopy;  Surgeon: Tram Florian MD;  [facility]     INJECT SACROILIAC JOINT Bilateral 6/16/2020    Procedure: " Bilateral sacroiliac joint steroid injection with fluoroscopy;  Surgeon: Tram Florian MD;  [facility]     LAMINECTOMY THORACIC ONE LEVEL N/A 8/19/2019    Procedure: LAMINECTOMY, SPINE, THORACIC, 11-12 and Part of Lumbar 1, DRAINAGE OF EPIDURAL ABCESS, Epidural Drain Placement X 2;  Surgeon: Yadiel Beal MD;  [facility] OR     RADIO FREQUENCY ABLATION / DESTRUCTION OF SACROILOAC JOINT DORSAL PRIMARY RAMUS Bilateral 12/17/2019    Procedure: Bilateral lumbar radiofrequency ablation with fluoroscopy and intravenous sedation ( Lumbar 2,3,4,5 medial branch nerves for the bilateral lumbar3-4, 4-5 and 5-sacral1 joints.;  Surgeon: Tram Florian MD;  [facility]     RADIO FREQUENCY ABLATION / DESTRUCTION OF SACROILOAC JOINT DORSAL PRIMARY RAMUS Right 11/17/2020    Procedure: Right lumbar medial branch nerve radiofrequency ablation right L2,3,4,5 nerves supplying the right L3-4, L4-5 and L5-S1 facet joints;  Surgeon: Tram Florian MD;  [facility] OR     spinal cord stimulator  08/08/2019     spinal cord stimulator removal  08/13/2019     Medications:  Current Outpatient Medications   Medication     albuterol (PROAIR HFA, PROVENTIL HFA, VENTOLIN HFA) 108 (90 BASE) MCG/ACT inhaler     ARIPiprazole (ABILIFY) 15 MG tablet     busPIRone HCl (BUSPAR) 30 MG tablet     cholecalciferol ( ULTRA STRENGTH) 2000 units CAPS     cyclobenzaprine (FLEXERIL) 10 MG tablet             1 tablet at bedtime      DULoxetine (CYMBALTA) 60 MG capsule      For pain and for mood      ferrous sulfate (FEROSUL) 325 (65 Fe) MG tablet     fluconazole (DIFLUCAN) 200 MG tablet     fluticasone-salmeterol (ADVAIR) 500-50 MCG/DOSE diskus inhaler     folic acid (FOLVITE) 1 MG tablet     lisinopril-hydrochlorothiazide (ZESTORETIC) 20-25 MG tablet     methotrexate sodium 2.5 MG TABS     montelukast (SINGULAIR) 10 MG tablet     nystatin (MYCOSTATIN) 305638 UNIT/GM external cream     omeprazole (PRILOSEC) 40 MG DR capsule      "OXYGEN-HELIUM IN     rOPINIRole (REQUIP) 0.5 MG tablet     traMADol (ULTRAM) 50 MG tablet             For Low back pain -  Dr. Florian      vitamin C 500 MG TABS     zinc sulfate (ZINCATE) 220 (50 Zn) MG capsule       Social History     Tobacco Use     Smoking status: Current Every Day Smoker     Packs/day: 1.00     Years: 30.00     Pack years: 30.00     Types: Cigarettes     Start date: 1/1/1996     Smokeless tobacco: Never Used     Tobacco comment: has tried the patch   Substance Use Topics     Alcohol use: No     Binge frequency: Monthly     Drug use: Yes     Types: Marijuana  -  occas      Comment: very rarely        Family History   Problem Relation Age of Onset     Other Cancer Father         base of tongue cancer at age ~65     Hypertension Father      Back Pain Father      Restless Leg Syndrome Father      Asthma Mother      Restless Leg Syndrome Mother      Restless Leg Syndrome Sister        Physical exam:       Ht    5'3\"     Wt:   225    BMI   38.9   LMP  (LMP Unknown)     General    Alert, cooperative.  No acute distress  Voice hoarseness   Pulmonary:   Breathing comfortably on room air. No cough, or shortness of breath  Skin:    Visible skin without lesions or obvious rash  Speech is fluent  Maintains eye contact   Musculoskeletal:    Moving extremities freely with good strength  She does have adequate cervical range of motion, only slighted limited in lateral rotation.   Able to raise arms above her head    Imaging:  CT OF THE CERVICAL SPINE WITHOUT CONTRAST   4/21/2021 9:36 AM      COMPARISON: Cervical spine x-rays 4/13/2021, 4/8/2021 and neck CT  4/4/2018.        FINDINGS: There are changes from ACDF at C3-4 and C4-5. The screw on  the right side at C3 level is fractured. There is less than 1 mm of  lucency around the screws bilaterally at the C3 level. The screws at  C4 are well seated bilaterally. There is less than 1 mm of lucency  around the screw on the left side at the C5 level. The screw on " the  right side at C5 level is well seated. The plate appears to be intact.  There is incomplete fusion of the interspaces at both the C3-4 and  C4-5 levels. There is calcification along the anterior margin of the  plate at the C4 and C5 levels. There is a fractured osteophyte which  is angulated and mildly displaced originating from the anterior right  aspect of the C3 vertebral body seen best on image 34 series 9. There  is mild thickening of the prevertebral tissues in this region. No  other fractures identified. Vertebral body heights are normal.  Alignment is normal. Facets articulate normally. The cervicomedullary  junction is patent.     C2-3: Moderate loss of disc height. Moderate facet hypertrophy on the  right and mild on the left. Spinal canal is patent. The right neural  foramen is moderately narrow. The left neural foramen is patent.     C3-4: Changes from ACDF. Osteophytic ridging causes mild to moderate  central spinal canal narrowing. Moderate severe facet hypertrophy in  the left side. Uncinate spurring causes moderate to severe narrowing  of the neural foramina bilaterally.     C4-5: Changes from ACDF. Osteophytic ridging causes mild spinal canal  narrowing. There is moderate facet hypertrophy on the left side.  Spurring causes mild narrowing of the right neural foramen and  moderate narrowing of left neural foramen.     C5-6: Mild loss of disc height. Shallow disc bulge. Severe facet  hypertrophy on the right side and uncinate spurring causes severe  narrowing of the right neural foramen. The spinal canal and left  neural foramen are patent.     C6-7: Normal disc height. Spinal canal is patent. Severe facet  hypertrophy bilaterally. Uncinate spurring bilaterally. The right  neural foramen is moderately narrowed. Left neural foramen is patent.     C7-T1: Normal disc height. Moderate facet hypertrophy on the left  side. The spinal canal is patent. The neural foramina are patent  bilaterally                                                                       IMPRESSION:  1. Changes from ACDF at C3-4 and C4-5. There is nonunion of the graft  material at the interspaces at C3-4 and C4-5.  2. Fractured osteophyte along the anterior inferior margin of the C3  vertebral body on the right side. Probably subacute. There does appear  to be some mild prevertebral soft tissue swelling at this site.  Correlate clinically.  3. Screw on the right side at the C3 level affixing the plate is  fractured.  4. Less than 1 mm lucency around the screws bilaterally at C3 and on  the left at C5.  5. Multilevel degenerative changes including mild to moderate central  spinal stenosis at C3-4.  6. Osteophytes cause neural foraminal stenosis which at C2-3 is  moderate on the right, at C3-4 is moderate to severe bilaterally, at  C4-5 is mild on the right and moderate on the left, at C5-6 severe on  the right and at C6-7 is moderate on the right.     MARISEL LEGER MD    I have personally reviewed imaging and agree with the radiologist's interpretation   and findings as outlined above.       Assessment:  ~History of prior cervical fusion, with non-fusion and hardware failure/fracture  ~Neck pain   ~Current tobacco smoker     The clinical data for general endotracheal anesthesia regarding patients who smoke is that smoking cessation needs to be a minimum of 6 weeks prior to surgery to avoid increased complication risk (due to ciliary hypomobility and decreased clearance of pulmonary secretions in initial post-cessation period) Patient needs to commit to stopping smoking for a minimum of 12 months after surgery (period of bone growth with spine fusions), although permanent smoking cessation will benefit heir overall health and decrease risk of adjacent segment degeneration in the spine.       Plan:  ~Smoking cessation  ~Review of imaging with spine neurosurgeon       At the end of the visit, all the patient's questions and concerns had been  addressed and the patient was agreeable with the plan of care as outlined above. The patient has our office contact information at [phone], and knows to call with any questions, concerns, or changes in condition.      Cara Ribera DNP  Neurosurgery Nurse Practitioner  [facility]  [phone]

## 2021-04-30 NOTE — PROGRESS NOTES
"Zayra is a 51 year old who is being evaluated via a billable video visit.      This is a video/telephone visit conducted due to [facility] on non-urgent clinic visits due to risk of the spread of COVID-19 pandemic virus. The patient did not identify any urgent or red-flag symptoms that would require in-person evaluation.    How would you like to obtain your AVS? mychart  If the video visit is dropped, the invitation should be resent by: send link to [phone]  Will anyone else be joining your video visit? no      Video Start Time:         9:06 am     Video-Visit Details    Type of service:  Video Visit    Video End Time              9:24 am     Originating Location (pt. Location):       Home     Distant Location (provider location):  [facility] NEUROSURGERY [facility]     Platform used for Video Visit: doximity    Reason for visit:      Neck pain -  Hx of prior surgery with fractured screw     History of present illness:   Zayra Ramirez is a very pleasant 51 year old female with past medical history of interstitial lung disease, COPD (Requiring O2), sleep apnea requiring C-Pap, reflux, hypertension, obesity, hx of lumbar surgery, spinal cord stimulator (Removed) and prior cervical spine fusion, who is seen at the request of CYNTHIA Cheung, for evaluation of her neck pain.   She underwent Cervical spine surgery with two level ACDF C3-C5 in 2017 with Dr. Rylan Monique.   She did quit smoking, however only for a short time, prior to this surgery.   She was doing very well following this surgery, until about 2 months ago, and then she started to notice her neck was making \"weird noises\"   No mechanism of injury such as trauma or a fall is associated with the onset of the pain.  She was seen by chiropractic for massage, no adjustments and underwent cervical spine xray imaging.   She then started to develop neck pain   She describes a constant, with " "fluctuating intensities, sharp pain with \"popping\" noises   that initiates in the left cervical region and   radiating pain to the left hand.   no specific digits, but affects all fingers.  No associated numbness/tingling, or upper extremity weakness  Right arm is asymptomatic.   She describes that Cervical rotation, to the left, seems to aggravate the symptoms, but she cannot appreciate any alleviating symptoms.    She denies changes in gait, instability, or falling episodes.      Review of systems: 10 point ROS negative except for as detailed in HPI  Past Medical History:   Past Medical History:   Diagnosis Date     Allergic rhinitis      Anemia      Arthritis      Asthma     Copd                      O2 at nighttime via C-PAP     Dental abscess 8-2015     Depressive disorder      Gastroesophageal reflux disease      History of emphysema      Hoarseness      Hypertension      Morbid obesity with BMI of 40.0-44.9, adult (H)      Respiratory bronchiolitis interstitial lung disease (H)      Sleep apnea      Surgical History:   Past Surgical History:   Procedure Laterality Date     CHOLECYSTECTOMY       ENT SURGERY       EXCISE LESION INTRAORAL Bilateral 10/3/2018    Procedure: EXCISE LESION INTRAORAL;  Wide Local Excision Of of Left Oral Cavity Ulcer;  Surgeon: Morro Mijares MD;  Location: [facility] OR     HYSTERECTOMY       INCISION AND DRAINAGE ABDOMEN WASHOUT, COMBINED       INJECT EPIDURAL LUMBAR Right 9/15/2020    Procedure: Lumbar5- sacral 1 epidural steroid injection with fluoroscopy;  Surgeon: Tram Florian MD;  [facility]     INJECT SACROILIAC JOINT Bilateral 6/16/2020    Procedure: Bilateral sacroiliac joint steroid injection with fluoroscopy;  Surgeon: Tram Florian MD;  [facility]     LAMINECTOMY THORACIC ONE LEVEL N/A 8/19/2019    Procedure: LAMINECTOMY, SPINE, THORACIC, 11-12 and Part of Lumbar 1, DRAINAGE OF EPIDURAL ABCESS, Epidural Drain Placement X 2;  Surgeon: Yadiel Beal, " MD;  [facility]: [facility] OR     RADIO FREQUENCY ABLATION / DESTRUCTION OF SACROILOAC JOINT DORSAL PRIMARY RAMUS Bilateral 12/17/2019    Procedure: Bilateral lumbar radiofrequency ablation with fluoroscopy and intravenous sedation ( Lumbar 2,3,4,5 medial branch nerves for the bilateral lumbar3-4, 4-5 and 5-sacral1 joints.;  Surgeon: Tram Florian MD;  [facility]     RADIO FREQUENCY ABLATION / DESTRUCTION OF SACROILOAC JOINT DORSAL PRIMARY RAMUS Right 11/17/2020    Procedure: Right lumbar medial branch nerve radiofrequency ablation right L2,3,4,5 nerves supplying the right L3-4, L4-5 and L5-S1 facet joints;  Surgeon: Tram Florian MD;  Location: [facility] OR     spinal cord stimulator  08/08/2019     spinal cord stimulator removal  08/13/2019     Medications:  Current Outpatient Medications   Medication     albuterol (PROAIR HFA, PROVENTIL HFA, VENTOLIN HFA) 108 (90 BASE) MCG/ACT inhaler     ARIPiprazole (ABILIFY) 15 MG tablet     busPIRone HCl (BUSPAR) 30 MG tablet     cholecalciferol ( ULTRA STRENGTH) 2000 units CAPS     cyclobenzaprine (FLEXERIL) 10 MG tablet             1 tablet at bedtime      DULoxetine (CYMBALTA) 60 MG capsule      For pain and for mood      ferrous sulfate (FEROSUL) 325 (65 Fe) MG tablet     fluconazole (DIFLUCAN) 200 MG tablet     fluticasone-salmeterol (ADVAIR) 500-50 MCG/DOSE diskus inhaler     folic acid (FOLVITE) 1 MG tablet     lisinopril-hydrochlorothiazide (ZESTORETIC) 20-25 MG tablet     methotrexate sodium 2.5 MG TABS     montelukast (SINGULAIR) 10 MG tablet     nystatin (MYCOSTATIN) 705428 UNIT/GM external cream     omeprazole (PRILOSEC) 40 MG DR capsule     OXYGEN-HELIUM IN     rOPINIRole (REQUIP) 0.5 MG tablet     traMADol (ULTRAM) 50 MG tablet             For Low back pain -  Dr. Florian      vitamin C 500 MG TABS     zinc sulfate (ZINCATE) 220 (50 Zn) MG capsule       Social History     Tobacco Use     Smoking status: Current Every Day Smoker     Packs/day: 1.00     Years: 30.00     " Pack years: 30.00     Types: Cigarettes     Start date: 1/1/1996     Smokeless tobacco: Never Used     Tobacco comment: has tried the patch   Substance Use Topics     Alcohol use: No     Binge frequency: Monthly     Drug use: Yes     Types: Marijuana  -  occas      Comment: very rarely        Family History   Problem Relation Age of Onset     Other Cancer Father         base of tongue cancer at age ~65     Hypertension Father      Back Pain Father      Restless Leg Syndrome Father      Asthma Mother      Restless Leg Syndrome Mother      Restless Leg Syndrome Sister        Physical exam:       Ht    5'3\"     Wt:   225    BMI   38.9   LMP  (LMP Unknown)     General    Alert, cooperative.  No acute distress  Voice hoarseness   Pulmonary:   Breathing comfortably on room air. No cough, or shortness of breath  Skin:    Visible skin without lesions or obvious rash  Speech is fluent  Maintains eye contact   Musculoskeletal:    Moving extremities freely with good strength  She does have adequate cervical range of motion, only slighted limited in lateral rotation.   Able to raise arms above her head    Imaging:  CT OF THE CERVICAL SPINE WITHOUT CONTRAST   4/21/2021 9:36 AM      COMPARISON: Cervical spine x-rays 4/13/2021, 4/8/2021 and neck CT  4/4/2018.        FINDINGS: There are changes from ACDF at C3-4 and C4-5. The screw on  the right side at C3 level is fractured. There is less than 1 mm of  lucency around the screws bilaterally at the C3 level. The screws at  C4 are well seated bilaterally. There is less than 1 mm of lucency  around the screw on the left side at the C5 level. The screw on the  right side at C5 level is well seated. The plate appears to be intact.  There is incomplete fusion of the interspaces at both the C3-4 and  C4-5 levels. There is calcification along the anterior margin of the  plate at the C4 and C5 levels. There is a fractured osteophyte which  is angulated and mildly displaced originating from " the anterior right  aspect of the C3 vertebral body seen best on image 34 series 9. There  is mild thickening of the prevertebral tissues in this region. No  other fractures identified. Vertebral body heights are normal.  Alignment is normal. Facets articulate normally. The cervicomedullary  junction is patent.     C2-3: Moderate loss of disc height. Moderate facet hypertrophy on the  right and mild on the left. Spinal canal is patent. The right neural  foramen is moderately narrow. The left neural foramen is patent.     C3-4: Changes from ACDF. Osteophytic ridging causes mild to moderate  central spinal canal narrowing. Moderate severe facet hypertrophy in  the left side. Uncinate spurring causes moderate to severe narrowing  of the neural foramina bilaterally.     C4-5: Changes from ACDF. Osteophytic ridging causes mild spinal canal  narrowing. There is moderate facet hypertrophy on the left side.  Spurring causes mild narrowing of the right neural foramen and  moderate narrowing of left neural foramen.     C5-6: Mild loss of disc height. Shallow disc bulge. Severe facet  hypertrophy on the right side and uncinate spurring causes severe  narrowing of the right neural foramen. The spinal canal and left  neural foramen are patent.     C6-7: Normal disc height. Spinal canal is patent. Severe facet  hypertrophy bilaterally. Uncinate spurring bilaterally. The right  neural foramen is moderately narrowed. Left neural foramen is patent.     C7-T1: Normal disc height. Moderate facet hypertrophy on the left  side. The spinal canal is patent. The neural foramina are patent  bilaterally                                                                      IMPRESSION:  1. Changes from ACDF at C3-4 and C4-5. There is nonunion of the graft  material at the interspaces at C3-4 and C4-5.  2. Fractured osteophyte along the anterior inferior margin of the C3  vertebral body on the right side. Probably subacute. There does appear  to  be some mild prevertebral soft tissue swelling at this site.  Correlate clinically.  3. Screw on the right side at the C3 level affixing the plate is  fractured.  4. Less than 1 mm lucency around the screws bilaterally at C3 and on  the left at C5.  5. Multilevel degenerative changes including mild to moderate central  spinal stenosis at C3-4.  6. Osteophytes cause neural foraminal stenosis which at C2-3 is  moderate on the right, at C3-4 is moderate to severe bilaterally, at  C4-5 is mild on the right and moderate on the left, at C5-6 severe on  the right and at C6-7 is moderate on the right.     MARISEL LEGER MD    I have personally reviewed imaging and agree with the radiologist's interpretation   and findings as outlined above.       Assessment:  ~History of prior cervical fusion, with non-fusion and hardware failure/fracture  ~Neck pain   ~Current tobacco smoker     The clinical data for general endotracheal anesthesia regarding patients who smoke is that smoking cessation needs to be a minimum of 6 weeks prior to surgery to avoid increased complication risk (due to ciliary hypomobility and decreased clearance of pulmonary secretions in initial post-cessation period) Patient needs to commit to stopping smoking for a minimum of 12 months after surgery (period of bone growth with spine fusions), although permanent smoking cessation will benefit heir overall health and decrease risk of adjacent segment degeneration in the spine.       Plan:  ~Smoking cessation  ~Review of imaging with spine neurosurgeon       At the end of the visit, all the patient's questions and concerns had been addressed and the patient was agreeable with the plan of care as outlined above. The patient has our office contact information at [phone], and knows to call with any questions, concerns, or changes in condition.        Cara Ribera DNP  Neurosurgery Nurse Practitioner  [facility]  [phone]

## 2021-05-04 ENCOUNTER — IMMUNIZATION (OUTPATIENT)
Dept: NURSING | Facility: CLINIC | Age: 51
End: 2021-05-04
Attending: INTERNAL MEDICINE
Payer: COMMERCIAL

## 2021-05-04 PROCEDURE — 0002A PR COVID VAC PFIZER DIL RECON 30 MCG/0.3 ML IM: CPT

## 2021-05-04 PROCEDURE — 91300 PR COVID VAC PFIZER DIL RECON 30 MCG/0.3 ML IM: CPT

## 2021-05-13 DIAGNOSIS — S12.9XXA PSEUDOARTHROSIS OF CERVICAL SPINE (H): Primary | ICD-10-CM

## 2021-05-18 DIAGNOSIS — G25.81 RESTLESS LEG SYNDROME: ICD-10-CM

## 2021-05-18 RX ORDER — PREGABALIN 150 MG/1
150 CAPSULE ORAL 2 TIMES DAILY
Qty: 60 CAPSULE | Refills: 3 | Status: SHIPPED | OUTPATIENT
Start: 2021-05-18 | End: 2021-11-16

## 2021-05-19 ENCOUNTER — TELEPHONE (OUTPATIENT)
Dept: INTERNAL MEDICINE | Facility: CLINIC | Age: 51
End: 2021-05-19

## 2021-05-19 ENCOUNTER — VIRTUAL VISIT (OUTPATIENT)
Dept: INTERNAL MEDICINE | Facility: CLINIC | Age: 51
End: 2021-05-19
Payer: COMMERCIAL

## 2021-05-19 DIAGNOSIS — G89.29 CHRONIC NECK PAIN: Primary | ICD-10-CM

## 2021-05-19 DIAGNOSIS — G89.29 CHRONIC MIDLINE LOW BACK PAIN WITH RIGHT-SIDED SCIATICA: ICD-10-CM

## 2021-05-19 DIAGNOSIS — M54.2 CHRONIC NECK PAIN: Primary | ICD-10-CM

## 2021-05-19 DIAGNOSIS — M54.41 CHRONIC MIDLINE LOW BACK PAIN WITH RIGHT-SIDED SCIATICA: ICD-10-CM

## 2021-05-19 PROCEDURE — 99443 PR PHYSICIAN TELEPHONE EVALUATION 21-30 MIN: CPT | Mod: 95 | Performed by: INTERNAL MEDICINE

## 2021-05-19 RX ORDER — CELECOXIB 200 MG/1
200 CAPSULE ORAL EVERY MORNING
Qty: 30 CAPSULE | Refills: 0 | Status: SHIPPED | OUTPATIENT
Start: 2021-05-19 | End: 2021-06-10

## 2021-05-19 NOTE — PATIENT INSTRUCTIONS
[facility] Medication Refill Request Information:  * Please contact your pharmacy regarding ANY request for medication refills.  ** [facility] Prescription Fax = [phone]  * Please allow 3 business days for routine medication refills.  * Please allow 5 business days for controlled substance medication refills.     [facility] Test Result notification information:  *You will be notified with in 7-10 days of your appointment day regarding the results of your test.  If you are on MyChart you will be notified as soon as the provider has reviewed the results and signed off on them.    [facility]: [phone]

## 2021-05-19 NOTE — PROGRESS NOTES
"This patient is being evaluated via a billable telephone visit; THIS VISIT WAS INITIATED BY THE PT, as AN ALTERNATIVE TO IN PERSON VISIT .       The patient has has been notified of following:      \"This billable telephone visit will be conducted via a call between you and your physician/provider. We have found that certain health care needs can be provided without the need for a physical exam.  This service lets us provide the care you need with a short phone conversation.  If a prescription is necessary we can send it directly to your pharmacy.  If lab work is needed we can place an order for that and you can then stop by our lab to have the test done at a later time. We can also place orders for a limited number of imaging tests if they are deemed urgently necessary.     If during the course of the call the physician/provider feels a telephone visit is not appropriate, you will not be charged for this service.\"     Due to efforts to reduce the spread of COVID-19 in the clinic, state, nation, telephone visits are encouraged currently. Patient understands that diagnose and advice is limited by the inability to exam him/her/them face-to-face.    Patient has given verbal consent for the virtual audio visit? Yes  Did patient initiate this virtual visit? Yes    Person spoken to: patient    This was a synchronous virtual visit  Time call initiated:  8:32 a.m.  Time call ended:  8:53 pm  Total length of call: 21 min    Francoise Chew M.D.  Internal Medicine  [facility]       Patients: if you have questions or concerns about this progress note, please discuss them with the provider at a future office visit.      "

## 2021-05-19 NOTE — PROGRESS NOTES
Pre charting:  This effort is essential to preparing for upcoming service directly affecting ongoing primary care management and coordination of care for this patient for the same day office visit.  Person performing pre charting work:  Dr. Chew  This non face-to-face clinical work included review/documentation of medical history, notes and test results outside our system (e.g.. CareEverywhere, paper copies), reviewing which specialists are also following the patient, updating problem list, reviewing medications, need for refills, vital sign trends,  flow sheets such as PHQ-9 and LISA-7 questionnaires, hospital discharge summaries, routine health care maintenance, specialist notes, laboratory, procedures, imaging, etc.    Date of pre charting: Today  Time: 1:20 am-1:48 am  Total time: 28 min      CC:  Body pain    HPI:  51 year old female, patient of NP Neena Mccoy  PMHx:  ILD, COPD, ROBERT, GERD, HTN, RLS, morbid obesity, seronegative inflammatory arthritis, c spine stenosis with myelopathy,S/P C3-5 fusion 2017, chronic midline LBP (hx LRFA), Right L5 lumbar radiculopathy, right SI dysfunction, hx epidural abscess with laminectomy and drainage, right lateral femoral cutaneous nerve impingement, left shoulder impingement, TMJ, borderline PD, tobacco use disorder.      Seen in neurosugery 4/30/21 for neck pain.  CT results from 4/21/21 were reviewed. Findings consistent with non fusion and hardware failure/fracture.  Imaging reviewed by Dr. Benson. Smoking cessation advised, starting at least 6 weeks prior to surgery.    Last visit with Dr. Florian/pain management clinic on 3/16/21. Note reviewed.  Rx'd short course of tramadol until able to come in for an LESI (right L5-S1), referred to PT.  Had appointment scheduled for 5/25/21 with Dr. Florian.    Last saw Dr. Saenz/rheumatology, on 3/12/21. Note reviewed.  Symptoms improved on methotrexate.    C/O neck and back pain (chronic). States lost her pregabalin.  Unable  to get a refill per pharmacy because it is a scheduled drug.  I advised her that she will be able to fill her rx at the next refill date.  She reports that it has been recommended that she get a stimulator, but she does not want one. For pain, she has been trying hot and cold packs, stretching, warm shower, lying down alternating with standing, tylenol, ibuprofen, cymbalta, naproxen-- all are not helping. Takes flexeril at bedtime for teeth grinding. Has done PT, has gotten RFA and injections for low back.  Would like to be more functional, and help improve her mental health by getting her pain under control .  I suggested a prescription NSAID (different class).  Advised to monitor for abdominal pain, black, maroon or bloody stools.  Stop medication if she develops any of the latter.  Okay for 30 day supply, with PCP and/or pain specialists to re-evaluate.  Takes omeprazole. Advised to schedule follow up appointments.  Patient expressed agreement with the plan.    Current Outpatient Medications   Medication Sig Dispense Refill     albuterol (PROAIR HFA, PROVENTIL HFA, VENTOLIN HFA) 108 (90 BASE) MCG/ACT inhaler Inhale 2 puffs into the lungs every 6 hours as needed for shortness of breath / dyspnea or wheezing (PT last dose 1.23.2020)        ARIPiprazole (ABILIFY) 15 MG tablet Take 15 mg by mouth At Bedtime        busPIRone HCl (BUSPAR) 30 MG tablet Take 15 mg by mouth At Bedtime       cholecalciferol ( ULTRA STRENGTH) 2000 units CAPS TAKE ONE CAPSULE BY MOUTH DAILY IN AM       cyclobenzaprine (FLEXERIL) 10 MG tablet TAKE 1 TABLET BY MOUTH EVERYDAY AT BEDTIME 90 tablet 0     DULoxetine (CYMBALTA) 60 MG capsule Take 120 mg by mouth At Bedtime        EPINEPHrine (EPIPEN/ADRENACLICK/OR ANY BX GENERIC EQUIV) 0.3 MG/0.3ML injection 2-pack INJECT 0.3ML INTO THE MUSCLE ONCE AS NEEDED FOR ANAPHYLAXIS       ferrous sulfate (FEROSUL) 325 (65 Fe) MG tablet Take 325 mg by mouth daily (with breakfast)   0     fluconazole  (DIFLUCAN) 200 MG tablet TAKE 1 TABLET BY MOUTH EVERY DAY FOR 14 DAYS 14 tablet 0     fluticasone-salmeterol (ADVAIR) 500-50 MCG/DOSE diskus inhaler Inhale 1 puff into the lungs 2 times daily        folic acid (FOLVITE) 1 MG tablet Take 1 tablet (1 mg) by mouth daily 90 tablet 3     lisinopril-hydrochlorothiazide (ZESTORETIC) 20-25 MG tablet Take 1 tablet by mouth daily 90 tablet 1     methotrexate sodium 2.5 MG TABS TAKE 9 TABLETS BY MOUTH ONCE WEEKLY  Labs  past due. appt past due. 108 tablet 5     montelukast (SINGULAIR) 10 MG tablet Take 10 mg by mouth At Bedtime       nystatin (MYCOSTATIN) 103989 UNIT/GM external cream Apply topically 2 times daily 30 g 3     omeprazole (PRILOSEC) 40 MG DR capsule Take 40 mg by mouth as needed (PT last dose appr 2 weeks ago)       OXYGEN-HELIUM IN 2-3L PRN during the day, 3L @ night       pregabalin (LYRICA) 150 MG capsule Take 1 capsule (150 mg) by mouth 2 times daily 60 capsule 3     Respiratory Therapy Supplies ([facility] CPAP FILTER) MISC        rOPINIRole (REQUIP) 0.5 MG tablet Take 1 tablet (0.5 mg) by mouth At Bedtime Take 1 tab by mouth at bedtime. 90 tablet 1     traMADol (ULTRAM) 50 MG tablet TAKE 1   2 TABLETS BY MOUTH EVERY 8 HOURS AS NEEDED FOR MODERATE TO SEVERE PAIN       vitamin C 500 MG TABS Take 500 mg by mouth daily (with lunch)        zinc sulfate (ZINCATE) 220 (50 Zn) MG capsule Take 220 mg by mouth daily (with lunch)        Allergies   Allergen Reactions     Bee Venom Anaphylaxis     Bees      Doxycycline Anaphylaxis     Patient thinks it may have been just nausea and vomiting, however unable to confirm     Erythromycin Anaphylaxis and Shortness Of Breath     Other reaction(s): Vomiting     Hydrocodone-Acetaminophen Itching     BP Readings from Last 6 Encounters:   04/13/21 128/75   04/08/21 104/70   03/29/21 108/70   03/28/21 101/60   11/17/20 102/51   09/15/20 122/77     Wt Readings from Last 5 Encounters:   04/13/21 102.1 kg (225 lb)   04/08/21  "102.1 kg (225 lb)   03/29/21 100.7 kg (222 lb)   01/05/21 99.8 kg (220 lb)   11/17/20 112.5 kg (248 lb)     Estimated body mass index is 38.92 kg/m  as calculated from the following:    Height as of 4/13/21: 1.619 m (5' 3.75\").    Weight as of 4/13/21: 102.1 kg (225 lb).  General:  Alert, breathing comfortably, actively engaged in conversation.      "

## 2021-05-19 NOTE — TELEPHONE ENCOUNTER
[facility] Call Center    Phone Message    May a detailed message be left on voicemail: yes     Reason for Call: Other: Patient calling wishing to change providers due to not wanting to go ahead with an implanted stimulator that Dr. Florian wants her to try, Patient states she has tried that in the past and it does not help. Please call to discuss changing providers thank you.      Action Taken: Message routed to:  [facility] ([facility] pain    Travel Screening: Not Applicable

## 2021-05-19 NOTE — NURSING NOTE
Chief Complaint   Patient presents with     Pain     Pt would like to discuss body pain. Pt 7/10 kenneth scale.     Video Visit Technology for this patient: No video technology available to patient, please call patient over the phone     Massiel Garcia LPN at 8:12 AM on 5/19/2021.

## 2021-05-24 NOTE — TELEPHONE ENCOUNTER
I called and spoke with the pt and informed her that Dr. Florian is okay continuing care with the pt even if she does not want to proceed with the SCS.    Pt stated verbal understanding.    I offered to schedule her a follow up with Dr. Florian but pt declined stating that she is seeing a different provider about her neck right now and will wait and see how that goes and decide if she wants to schedule a follow up with Dr. Florian.    Pt stated verbal understanding and had no further questions and concerns.    Indira Escamilla, CMA

## 2021-05-25 ENCOUNTER — TELEPHONE (OUTPATIENT)
Dept: INTERNAL MEDICINE | Facility: CLINIC | Age: 51
End: 2021-05-25

## 2021-05-25 DIAGNOSIS — B37.0 THRUSH: Primary | ICD-10-CM

## 2021-05-25 NOTE — TELEPHONE ENCOUNTER
" [facility] Call Center    Phone Message    May a detailed message be left on voicemail: yes     Reason for Call: Symptoms or Concerns     If patient has red-flag symptoms, warm transfer to triage line    Current symptom or concern: thrush still on vocal cords, no voice, itchy throat, spitting up  \"goop\"     Symptoms have been present for:  3 month(s)    Has patient previously been seen for this? Yes    By Neena Tam    Date:      Are there any new or worsening symptoms? Yes:        Action Taken: Message routed to:  [facility] ([facility] pcc    Travel Screening: Not Applicable                                                                        "

## 2021-05-26 NOTE — TELEPHONE ENCOUNTER
Catarino Chaudhary is still having sxs.  Do you want to extend the treatment for yeast infection or send the new Rx from 4/8/21 result ?

## 2021-05-27 RX ORDER — FLUCONAZOLE 100 MG/1
TABLET ORAL
Qty: 15 TABLET | Refills: 0 | Status: SHIPPED | OUTPATIENT
Start: 2021-05-27 | End: 2021-06-17

## 2021-06-01 DIAGNOSIS — F17.200 SMOKER: ICD-10-CM

## 2021-06-01 DIAGNOSIS — M19.90 INFLAMMATORY ARTHRITIS: ICD-10-CM

## 2021-06-01 DIAGNOSIS — Z79.899 HIGH RISK MEDICATION USE: Primary | ICD-10-CM

## 2021-06-02 VITALS — HEIGHT: 63 IN | WEIGHT: 229 LBS | BODY MASS INDEX: 40.57 KG/M2

## 2021-06-02 VITALS — BODY MASS INDEX: 40.57 KG/M2 | HEIGHT: 63 IN | WEIGHT: 229 LBS

## 2021-06-03 VITALS — WEIGHT: 240 LBS | BODY MASS INDEX: 42.52 KG/M2 | HEIGHT: 63 IN

## 2021-06-04 ENCOUNTER — HOSPITAL ENCOUNTER (OUTPATIENT)
Facility: AMBULATORY SURGERY CENTER | Age: 51
End: 2021-06-04
Attending: ANESTHESIOLOGY
Payer: COMMERCIAL

## 2021-06-08 ENCOUNTER — HOSPITAL ENCOUNTER (OUTPATIENT)
Facility: AMBULATORY SURGERY CENTER | Age: 51
End: 2021-06-08
Attending: ANESTHESIOLOGY
Payer: COMMERCIAL

## 2021-06-09 NOTE — PATIENT INSTRUCTIONS
1.  You were seen in the ENT Clinic today by Dr. Mijares.  If you have any questions or concerns after your appointment, please call [phone].  Press option #1 for scheduling related needs.  Press option #3 for Nurse advice.  2.  Plan is to return to clinic in 6 weeks after using gentian violet on the oral ulcers, 3-4 times a week.      Shara LOOMISN, RN  [facility] ENT   Head & Neck Surgery         
09-Jun-2021 12:00

## 2021-06-10 ENCOUNTER — OFFICE VISIT (OUTPATIENT)
Dept: INTERNAL MEDICINE | Facility: CLINIC | Age: 51
End: 2021-06-10
Payer: COMMERCIAL

## 2021-06-10 VITALS
BODY MASS INDEX: 38.41 KG/M2 | OXYGEN SATURATION: 93 % | SYSTOLIC BLOOD PRESSURE: 93 MMHG | DIASTOLIC BLOOD PRESSURE: 61 MMHG | WEIGHT: 222 LBS | HEART RATE: 83 BPM

## 2021-06-10 DIAGNOSIS — J30.2 SEASONAL ALLERGIC RHINITIS, UNSPECIFIED TRIGGER: ICD-10-CM

## 2021-06-10 DIAGNOSIS — G89.29 CHRONIC NECK PAIN: ICD-10-CM

## 2021-06-10 DIAGNOSIS — M54.41 CHRONIC MIDLINE LOW BACK PAIN WITH RIGHT-SIDED SCIATICA: ICD-10-CM

## 2021-06-10 DIAGNOSIS — Z12.31 ENCOUNTER FOR SCREENING MAMMOGRAM FOR BREAST CANCER: ICD-10-CM

## 2021-06-10 DIAGNOSIS — B37.0 THRUSH: Primary | ICD-10-CM

## 2021-06-10 DIAGNOSIS — M54.2 CHRONIC NECK PAIN: ICD-10-CM

## 2021-06-10 DIAGNOSIS — G89.29 CHRONIC MIDLINE LOW BACK PAIN WITH RIGHT-SIDED SCIATICA: ICD-10-CM

## 2021-06-10 PROCEDURE — 99214 OFFICE O/P EST MOD 30 MIN: CPT | Performed by: NURSE PRACTITIONER

## 2021-06-10 RX ORDER — CELECOXIB 200 MG/1
200 CAPSULE ORAL EVERY MORNING
Qty: 60 CAPSULE | Refills: 4 | Status: SHIPPED | OUTPATIENT
Start: 2021-06-10 | End: 2021-12-17

## 2021-06-10 RX ORDER — CETIRIZINE HYDROCHLORIDE 10 MG/1
10 TABLET ORAL DAILY
Qty: 69 TABLET | Refills: 3 | Status: SHIPPED | OUTPATIENT
Start: 2021-06-10 | End: 2022-02-22

## 2021-06-10 NOTE — NURSING NOTE
Chief Complaint   Patient presents with     Mouth Problem     Pt is following up on thrush.     Pain     pt would like to discuss pain.      Massiel Garcia LPN at 10:23 AM on 6/10/2021.

## 2021-06-10 NOTE — PATIENT INSTRUCTIONS
[facility] Medication Refill Request Information:  * Please contact your pharmacy regarding ANY request for medication refills.  ** [facility] Prescription Fax = [phone]  * Please allow 3 business days for routine medication refills.  * Please allow 5 business days for controlled substance medication refills.     [facility] Test Result notification information:  *You will be notified with in 7-10 days of your appointment day regarding the results of your test.  If you are on MyChart you will be notified as soon as the provider has reviewed the results and signed off on them.    [facility]: [phone]     Contact Dr. Bonilla's office regarding a new inhaler.  Let her know I added Zyrtec.  You are being evaluated for thrush by ENT.    Follow-up with Dr. Kim for pain management.    Continue Celebrex 200 mg a day.     Follow-up With Cara Ribera regarding results of bone stimulator.

## 2021-06-10 NOTE — PROGRESS NOTES
S:     Zayra is a:  51 year old female with a hx of ILD, COPD, ROBERT, GERD, HTN, RLS, morbid obesity, seronegative inflammatory arthritis, c spine stenosis with myelopathy,S/P C3-5 fusion 2017, chronic midline LBP (hx LRFA), Right L5 lumbar radiculopathy, right SI dysfunction, hx epidural abscess with laminectomy and drainage, right lateral femoral cutaneous nerve impingement, left shoulder impingement, TMJ, borderline PD, tobacco use disorder.     Seen in neurosugery 4/30/21 for neck pain.  CT results from 4/21/21 were reviewed. Findings consistent with non fusion and hardware failure/fracture.  Imaging reviewed by Dr. Benson. Smoking cessation advised, starting at least 6 weeks prior to surgery.     Her thrush was initially better with the Fluconazole but recurred causing voice hoarseness and it is an effort to speak. Her tinea corporis responded well to the anti-fungal creams.     She found her lost Pregabalin tablets,  which has helped her neck pain   some.  Tramadol made her sleepy. She was prescribed a bone stimulator today which she just picked up.    She scored an 8 on her ACT test.  She has not been using her steroid inhaler, only her albuterol inhaler. She had a steroid inhaler prescribed by  at [facility] but stated she had side effects.     She scored 16 on her PHQ9 and a 14 on the LISA. She thinks her symptoms are related to her physical symptoms.     She has [facility] and the goal is a LESI lumbar spine for left leg radicular pain.   She has had PT but does not think it helps.     She stopped all caffeine products and carbonation.   She is still taking iron supplement.    Patient Active Problem List   Diagnosis     Chronic midline low back pain     Facial cellulitis     Morbid (severe) obesity due to excess calories (H)     Dental abscess     Hypoxia     Subcutaneous emphysema (H)     Borderline personality disorder (H)     Stenosis of cervical  spine with myelopathy (H)     Esophageal stricture     GERD (gastroesophageal reflux disease)     HTN (hypertension)     Interstitial lung disease (H)     Moderate persistent asthma without complication     Onychomycosis     Restless leg syndrome     Seasonal allergies     Smoker     Stress     Respiratory bronchiolitis interstitial lung disease (H)     Spinal epidural abscess     Lumbar spondylosis     Inflammatory arthritis     Arthralgia of temporomandibular joint     Articular disc disorder of temporomandibular joint     Derangement of temporomandibular joint     Calculus of gallbladder without cholecystitis without obstruction     Displacement of articular disc of temporomandibular joint with reduction     Myofascial pain     Oral lesion     Symptomatic cholelithiasis     Sacro-iliac pain     Degenerative lumbar spinal stenosis     Chronic lumbar radiculopathy     Glucose intolerance     Chronic neck pain            Past Medical History:   Diagnosis Date     Allergic rhinitis      Anemia      Arthritis      Asthma     copd     Dental abscess 8-2015     Depressive disorder      Gastroesophageal reflux disease      History of emphysema      Hoarseness      Hypertension      Morbid obesity with BMI of 40.0-44.9, adult (H)      Obstructive sleep apnea      Other chronic pain      Respiratory bronchiolitis interstitial lung disease (H)      Sleep apnea             Past Surgical History:   Procedure Laterality Date     CHOLECYSTECTOMY       COLONOSCOPY       COLONOSCOPY N/A 2/6/2020    Procedure: COLONOSCOPY, WITH POLYPECTOMY AND BIOPSY;  Surgeon: Julian Mccullough MD;  Location:  GI     ENT SURGERY       ESOPHAGOSCOPY, GASTROSCOPY, DUODENOSCOPY (EGD), COMBINED N/A 2/6/2020    Procedure: ESOPHAGOGASTRODUODENOSCOPY (EGD);  Surgeon: Julian Mccullough MD;  Location: [facility]     EXCISE LESION INTRAORAL Bilateral 10/3/2018    Procedure: EXCISE LESION INTRAORAL;  Wide Local Excision Of of Left Oral Cavity  Ulcer;  Surgeon: Morro Mijares MD;  Location: [facility] OR     HC DRAIN SKIN ABSCESS SIMPLE/SINGLE  3/16/2012    Procedure:INCISION AND DRAINAGE, ABSCESS, SIMPLE; Surgeon:CHRISTIANO HANCOCK; Location: GI     HEAD & NECK SURGERY       HYSTERECTOMY       INCISION AND DRAINAGE ABDOMEN WASHOUT, COMBINED       INJECT EPIDURAL LUMBAR Right 9/15/2020    Procedure: Lumbar5- sacral 1 epidural steroid injection with fluoroscopy;  Surgeon: Tram Florian MD;  Location: [facility] OR     INJECT SACROILIAC JOINT Bilateral 6/16/2020    Procedure: Bilateral sacroiliac joint steroid injection with fluoroscopy;  Surgeon: Tram Florian MD;  Location: [facility] OR     LAMINECTOMY THORACIC ONE LEVEL N/A 8/19/2019    Procedure: LAMINECTOMY, SPINE, THORACIC, 11-12 and Part of Lumbar 1, DRAINAGE OF EPIDURAL ABCESS, Epidural Drain Placement X 2;  Surgeon: Yadiel Beal MD;  Location: [facility] OR     RADIO FREQUENCY ABLATION / DESTRUCTION OF SACROILOAC JOINT DORSAL PRIMARY RAMUS Bilateral 12/17/2019    Procedure: Bilateral lumbar radiofrequency ablation with fluoroscopy and intravenous sedation ( Lumbar 2,3,4,5 medial branch nerves for the bilateral lumbar3-4, 4-5 and 5-sacral1 joints.;  Surgeon: Tram Florian MD;  Location: [facility] OR     RADIO FREQUENCY ABLATION / DESTRUCTION OF SACROILOAC JOINT DORSAL PRIMARY RAMUS Right 11/17/2020    Procedure: Right lumbar medial branch nerve radiofrequency ablation right L2,3,4,5 nerves supplying the right L3-4, L4-5 and L5-S1 facet joints;  Surgeon: Tram Florian MD;  Location: [facility] OR     spinal cord stimulator  08/08/2019     spinal cord stimulator removal  08/13/2019            Social History     Tobacco Use     Smoking status: Current Every Day Smoker     Packs/day: 1.00     Years: 30.00     Pack years: 30.00     Types: Cigarettes     Start date: 1/1/1996     Smokeless tobacco: Never Used     Tobacco comment: has tried the patch   Substance Use Topics     Alcohol use: No     Binge frequency: Monthly             Family History   Problem Relation Age of Onset     Other Cancer Father         base of tongue cancer at age ~65     Hypertension Father      Back Pain Father      Restless Leg Syndrome Father      Asthma Mother      Restless Leg Syndrome Mother      Restless Leg Syndrome Sister                Allergies   Allergen Reactions     Bee Venom Anaphylaxis     Bees      Doxycycline Anaphylaxis     Patient thinks it may have been just nausea and vomiting, however unable to confirm     Erythromycin Anaphylaxis and Shortness Of Breath     Other reaction(s): Vomiting     Hydrocodone-Acetaminophen Itching            Current Outpatient Medications   Medication Sig Dispense Refill     albuterol (PROAIR HFA, PROVENTIL HFA, VENTOLIN HFA) 108 (90 BASE) MCG/ACT inhaler Inhale 2 puffs into the lungs every 6 hours as needed for shortness of breath / dyspnea or wheezing (PT last dose 1.23.2020)        ARIPiprazole (ABILIFY) 15 MG tablet Take 15 mg by mouth At Bedtime        busPIRone HCl (BUSPAR) 30 MG tablet Take 15 mg by mouth At Bedtime       celecoxib (CELEBREX) 200 MG capsule Take 1 capsule (200 mg) by mouth every morning 30 capsule 0     cholecalciferol ( ULTRA STRENGTH) 2000 units CAPS TAKE ONE CAPSULE BY MOUTH DAILY IN AM       cyclobenzaprine (FLEXERIL) 10 MG tablet TAKE 1 TABLET BY MOUTH EVERYDAY AT BEDTIME 90 tablet 0     DULoxetine (CYMBALTA) 60 MG capsule Take 120 mg by mouth At Bedtime        EPINEPHrine (EPIPEN/ADRENACLICK/OR ANY BX GENERIC EQUIV) 0.3 MG/0.3ML injection 2-pack INJECT 0.3ML INTO THE MUSCLE ONCE AS NEEDED FOR ANAPHYLAXIS       ferrous sulfate (FEROSUL) 325 (65 Fe) MG tablet Take 325 mg by mouth daily (with breakfast)   0     fluconazole (DIFLUCAN) 100 MG tablet Take 2 tabs by mouth on day 1, then 1 tab daily x 14 days. 15 tablet 0     fluconazole (DIFLUCAN) 200 MG tablet TAKE 1 TABLET BY MOUTH EVERY DAY FOR 14 DAYS 14 tablet 0     fluticasone-salmeterol (ADVAIR) 500-50 MCG/DOSE diskus inhaler  Inhale 1 puff into the lungs 2 times daily        folic acid (FOLVITE) 1 MG tablet Take 1 tablet (1 mg) by mouth daily 90 tablet 3     lisinopril-hydrochlorothiazide (ZESTORETIC) 20-25 MG tablet Take 1 tablet by mouth daily 90 tablet 1     methotrexate sodium 2.5 MG TABS TAKE 9 TABLETS BY MOUTH ONCE WEEKLY  Labs  past due. appt past due. 108 tablet 5     montelukast (SINGULAIR) 10 MG tablet Take 10 mg by mouth At Bedtime       nystatin (MYCOSTATIN) 851217 UNIT/GM external cream Apply topically 2 times daily 30 g 3     omeprazole (PRILOSEC) 40 MG DR capsule Take 40 mg by mouth as needed (PT last dose appr 2 weeks ago)       OXYGEN-HELIUM IN 2-3L PRN during the day, 3L @ night       pregabalin (LYRICA) 150 MG capsule Take 1 capsule (150 mg) by mouth 2 times daily 60 capsule 3     Respiratory Therapy Supplies ([facility] CPAP FILTER) MISC        rOPINIRole (REQUIP) 0.5 MG tablet Take 1 tablet (0.5 mg) by mouth At Bedtime Take 1 tab by mouth at bedtime. 90 tablet 1     traMADol (ULTRAM) 50 MG tablet TAKE 1   2 TABLETS BY MOUTH EVERY 8 HOURS AS NEEDED FOR MODERATE TO SEVERE PAIN       vitamin C 500 MG TABS Take 500 mg by mouth daily (with lunch)        zinc sulfate (ZINCATE) 220 (50 Zn) MG capsule Take 220 mg by mouth daily (with lunch)        Immunization History   Administered Date(s) Administered     COVID-19,PF,Pfizer 04/13/2021, 05/04/2021     Flu, Unspecified 08/27/2017, 12/12/2018, 11/01/2019     HepB-Adult 05/09/2003, 07/11/2003, 01/12/2011, 07/10/2013     Influenza (IIV3) PF 09/20/2012, 11/24/2014     Influenza Vaccine IM > 6 months Valent IIV4 08/26/2017, 08/27/2017, 10/14/2019, 10/20/2020     Influenza,INJ,MDCK,PF,Quad >4yrs 12/12/2018     Pneumo Conj 13-V (2010&after) 12/05/2019     Pneumococcal 23 valent 04/18/2001     TDAP Vaccine (Boostrix) 10/07/2010, 11/16/2020        REVIEW OF SYSTEMS:  See above.     O:   BP 90/60   Pulse 83   Wt 100.7 kg (222 lb)   LMP  (LMP Unknown)   SpO2 93%   BMI  38.41 kg/m    GENERAL APPEARANCE: healthy, alert and no distress  EYES: EOMI,  PERRL  HENT: ear canals and TM's normal and nose and mouth without ulcers or lesions  RESP: lungs clear to auscultation - no rales, rhonchi or wheezes  CV: regular rates and rhythm, normal S1 S2, no S3 or S4 and no murmur, click or rub   ABDOMEN:  soft, nontender, no HSM or masses and bowel sounds normal  NEURO: normal  SKIN: .grossly normal  EXT: warm.  Edema NO   PSYCHE: Mood is good. Alert and oriented.     A/P:  Zayra was seen today for mouth problem and pain.    Diagnoses and all orders for this visit:    Thrush  -     OTOLARYNGOLOGY REFERRAL to assess for esophageal thrush.    Seasonal allergic rhinitis, unspecified trigger  -     cetirizine (ZYRTEC) 10 MG tablet; Take 1 tablet (10 mg) by mouth daily        -      She will call Dr. Bonilla regarding her steroid inhaler.  Advised she will need to rinse her mouth carefully after using a steroid inhaler.      Chronic neck pain  -     celecoxib (CELEBREX) 200 MG capsule; Take 1 capsule (200 mg) by mouth every morning    Chronic midline low back pain with right-sided sciatica  -     celecoxib (CELEBREX) 200 MG capsule; Take 1 capsule (200 mg) by mouth every morning    Encounter for screening mammogram for breast cancer  -     Mammogram, routine screening; Future    Total time spent today with this patient including chart review, exam time with patient and documentation : 30 minutes      Neena MAHARAJ, CNP

## 2021-06-11 ASSESSMENT — ASTHMA QUESTIONNAIRES
QUESTION_5 LAST FOUR WEEKS HOW WOULD YOU RATE YOUR ASTHMA CONTROL: SOMEWHAT CONTROLLED
ACUTE_EXACERBATION_TODAY: NO
QUESTION_1 LAST FOUR WEEKS HOW MUCH OF THE TIME DID YOUR ASTHMA KEEP YOU FROM GETTING AS MUCH DONE AT WORK, SCHOOL OR AT HOME: MOST OF THE TIME
ACT_TOTALSCORE: 8
QUESTION_3 LAST FOUR WEEKS HOW OFTEN DID YOUR ASTHMA SYMPTOMS (WHEEZING, COUGHING, SHORTNESS OF BREATH, CHEST TIGHTNESS OR PAIN) WAKE YOU UP AT NIGHT OR EARLIER THAN USUAL IN THE MORNING: FOUR OR MORE NIGHTS A WEEK
QUESTION_4 LAST FOUR WEEKS HOW OFTEN HAVE YOU USED YOUR RESCUE INHALER OR NEBULIZER MEDICATION (SUCH AS ALBUTEROL): THREE OR MORE TIMES PER DAY
QUESTION_2 LAST FOUR WEEKS HOW OFTEN HAVE YOU HAD SHORTNESS OF BREATH: MORE THAN ONCE A DAY

## 2021-06-11 ASSESSMENT — ANXIETY QUESTIONNAIRES
6. BECOMING EASILY ANNOYED OR IRRITABLE: MORE THAN HALF THE DAYS
5. BEING SO RESTLESS THAT IT IS HARD TO SIT STILL: SEVERAL DAYS
7. FEELING AFRAID AS IF SOMETHING AWFUL MIGHT HAPPEN: SEVERAL DAYS
IF YOU CHECKED OFF ANY PROBLEMS ON THIS QUESTIONNAIRE, HOW DIFFICULT HAVE THESE PROBLEMS MADE IT FOR YOU TO DO YOUR WORK, TAKE CARE OF THINGS AT HOME, OR GET ALONG WITH OTHER PEOPLE: VERY DIFFICULT
2. NOT BEING ABLE TO STOP OR CONTROL WORRYING: NEARLY EVERY DAY
GAD7 TOTAL SCORE: 14
1. FEELING NERVOUS, ANXIOUS, OR ON EDGE: MORE THAN HALF THE DAYS
3. WORRYING TOO MUCH ABOUT DIFFERENT THINGS: MORE THAN HALF THE DAYS

## 2021-06-11 ASSESSMENT — PATIENT HEALTH QUESTIONNAIRE - PHQ9: 5. POOR APPETITE OR OVEREATING: NEARLY EVERY DAY

## 2021-06-12 ASSESSMENT — ANXIETY QUESTIONNAIRES: GAD7 TOTAL SCORE: 14

## 2021-06-12 ASSESSMENT — ASTHMA QUESTIONNAIRES: ACT_TOTALSCORE: 8

## 2021-06-15 ENCOUNTER — VIRTUAL VISIT (OUTPATIENT)
Dept: ANESTHESIOLOGY | Facility: CLINIC | Age: 51
End: 2021-06-15
Payer: COMMERCIAL

## 2021-06-15 DIAGNOSIS — M79.2 NEUROPATHIC PAIN: ICD-10-CM

## 2021-06-15 DIAGNOSIS — M54.16 LUMBAR RADICULOPATHY, CHRONIC: Primary | ICD-10-CM

## 2021-06-15 PROCEDURE — 99213 OFFICE O/P EST LOW 20 MIN: CPT | Mod: 95 | Performed by: ANESTHESIOLOGY

## 2021-06-15 ASSESSMENT — PAIN SCALES - GENERAL: PAINLEVEL: MODERATE PAIN (4)

## 2021-06-15 NOTE — PROGRESS NOTES
Type of service:  Video Visit    Video Start Time: 8:58 AM    Video End Time:9:10 AM    Originating Location (pt. Location): Home    Distant Location (provider location):  [facility] [place]     Platform used for Video Visit: [facility]    Pain clinic follow up note    HPI:   Zayra Ramirez is a 51 year old year old female  who presents to the pain clinic with worsening neck pain, low back pain, s/p recetn diagnosis of C3 screw fracture.     Patient Supplied Answers To the [facility] Pain Questionnaire  [facility] -  Patient Entered Questionnaire/Answers 6/15/2021   What number best describes your pain right now:  0 = No pain  to  10 = Worst pain imaginable 4   How would you describe the pain? sharp, dull, aching   Which of the following worsen your pain? sitting   Which of the following improve or reduce your pain?  nothing relieves the pain   What number best describes your average pain for the past week:  0 = No pain  to  10 = Worst pain imaginable 7   What number best describes your LOWEST pain in past 24 hours:  0 = No pain  to  10 = Worst pain imaginable 4   What number best describes your WORST pain in past 24 hours:  0 = No pain  to  10 = Worst pain imaginable 8   When is your pain worst? Constant   What non-medicine treatments have you already had for your pain? none   Have you tried treating your pain with medication?  No   Are you currently taking medications for your pain? No             Zayra a 51-year-old female presents to clinic today with a past medical history significant for interstitial lung disease, COPD requiring oxygen, CPAP for sleep apnea, reflux hypertension, obesity, history of lumbar surgery, spinal cord stimulator extracted and prior cervical fusion for worsening neck pain and low back pain.  The patient describes acute onset of neck pain and was recently diagnosed with a fractured C3 screw from a past ACDF C3-5.  Surgical treatment is on  hold currently as the patient is a smoker.  The patient has been advised that she needs to stop smoking for at least 6 weeks prior to surgery and come back to 12 months of smoking cessation after surgery.  This was reiterated today in the visit.     Because of worsening neck pain the patient held off on low back pain treatments that were discussed at the last visit in March.  She reports low back pain with radiation to the left leg especially in the L5 dermatome worse with transition from seated to a standing position.  She reports that this radiating pain is worsening and becoming more frequent especially a few times a week.  She is able to currently work about 2 hours at a time and is extremely uncomfortable.  During the video visit the patient is noted to be standing and appears uncomfortable.    ROS:  Review of Systems   All other systems reviewed and are negative.    Answers for HPI/ROS submitted by the patient on 6/15/2021   General Symptoms: No  Skin Symptoms: No  HENT Symptoms: No  EYE SYMPTOMS: No  HEART SYMPTOMS: No  LUNG SYMPTOMS: No  INTESTINAL SYMPTOMS: No  URINARY SYMPTOMS: No  GYNECOLOGIC SYMPTOMS: No  BREAST SYMPTOMS: No  SKELETAL SYMPTOMS: No  BLOOD SYMPTOMS: No  NERVOUS SYSTEM SYMPTOMS: No  MENTAL HEALTH SYMPTOMS: No      Significant Medical History:   Past Medical History:   Diagnosis Date     Allergic rhinitis      Anemia      Arthritis      Asthma     copd     Dental abscess 8-2015     Depressive disorder      Gastroesophageal reflux disease      History of emphysema      Hoarseness      Hypertension      Morbid obesity with BMI of 40.0-44.9, adult (H)      Obstructive sleep apnea      Other chronic pain      Respiratory bronchiolitis interstitial lung disease (H)      Sleep apnea           Past Surgical History:  Past Surgical History:   Procedure Laterality Date     CHOLECYSTECTOMY       COLONOSCOPY       COLONOSCOPY N/A 2/6/2020    Procedure: COLONOSCOPY, WITH POLYPECTOMY AND BIOPSY;   Surgeon: Julian Mccullough MD;  Location:  GI     ENT SURGERY       ESOPHAGOSCOPY, GASTROSCOPY, DUODENOSCOPY (EGD), COMBINED N/A 2/6/2020    Procedure: ESOPHAGOGASTRODUODENOSCOPY (EGD);  Surgeon: Julian Mccullough MD;  Location:  GI     EXCISE LESION INTRAORAL Bilateral 10/3/2018    Procedure: EXCISE LESION INTRAORAL;  Wide Local Excision Of of Left Oral Cavity Ulcer;  Surgeon: Morro Mijares MD;  Location: [facility] OR     HC DRAIN SKIN ABSCESS SIMPLE/SINGLE  3/16/2012    Procedure:INCISION AND DRAINAGE, ABSCESS, SIMPLE; Surgeon:CHRISTIANO HANCOCK; Location: GI     HEAD & NECK SURGERY       HYSTERECTOMY       INCISION AND DRAINAGE ABDOMEN WASHOUT, COMBINED       INJECT EPIDURAL LUMBAR Right 9/15/2020    Procedure: Lumbar5- sacral 1 epidural steroid injection with fluoroscopy;  Surgeon: Tram Florian MD;  [facility]     INJECT SACROILIAC JOINT Bilateral 6/16/2020    Procedure: Bilateral sacroiliac joint steroid injection with fluoroscopy;  Surgeon: Tram Florian MD;  [facility]     LAMINECTOMY THORACIC ONE LEVEL N/A 8/19/2019    Procedure: LAMINECTOMY, SPINE, THORACIC, 11-12 and Part of Lumbar 1, DRAINAGE OF EPIDURAL ABCESS, Epidural Drain Placement X 2;  Surgeon: Yadiel Beal MD;  Location:  OR     RADIO FREQUENCY ABLATION / DESTRUCTION OF SACROILOAC JOINT DORSAL PRIMARY RAMUS Bilateral 12/17/2019    Procedure: Bilateral lumbar radiofrequency ablation with fluoroscopy and intravenous sedation ( Lumbar 2,3,4,5 medial branch nerves for the bilateral lumbar3-4, 4-5 and 5-sacral1 joints.;  Surgeon: Tram Florian MD;  [facility]     RADIO FREQUENCY ABLATION / DESTRUCTION OF SACROILOAC JOINT DORSAL PRIMARY RAMUS Right 11/17/2020    Procedure: Right lumbar medial branch nerve radiofrequency ablation right L2,3,4,5 nerves supplying the right L3-4, L4-5 and L5-S1 facet joints;  Surgeon: Tram Florian MD;  Location: [facility] OR     spinal cord stimulator  08/08/2019      spinal cord stimulator removal  08/13/2019          Family History:  Family History   Problem Relation Age of Onset     Other Cancer Father         base of tongue cancer at age ~65     Hypertension Father      Back Pain Father      Restless Leg Syndrome Father      Asthma Mother      Restless Leg Syndrome Mother      Restless Leg Syndrome Sister      Breast Cancer Maternal Aunt 55          Social History:  Social History     Socioeconomic History     Marital status: Single     Spouse name: Not on file     Number of children: Not on file     Years of education: Not on file     Highest education level: Not on file   Occupational History     Not on file   Social Needs     Financial resource strain: Not on file     Food insecurity     Worry: Not on file     Inability: Not on file     Transportation needs     Medical: Not on file     Non-medical: Not on file   Tobacco Use     Smoking status: Current Every Day Smoker     Packs/day: 1.00     Years: 30.00     Pack years: 30.00     Types: Cigarettes     Start date: 1/1/1996     Smokeless tobacco: Never Used     Tobacco comment: has tried the patch   Substance and Sexual Activity     Alcohol use: No     Binge frequency: Monthly     Drug use: Yes     Types: Marijuana     Comment: very rarely      Sexual activity: Never   Lifestyle     Physical activity     Days per week: Not on file     Minutes per session: Not on file     Stress: Not on file   Relationships     Social connections     Talks on phone: Not on file     Gets together: Not on file     Attends Oriental orthodox service: Not on file     Active member of club or organization: Not on file     Attends meetings of clubs or organizations: Not on file     Relationship status: Not on file     Intimate partner violence     Fear of current or ex partner: Not on file     Emotionally abused: Not on file     Physically abused: Not on file     Forced sexual activity: Not on file   Other Topics Concern     Parent/sibling w/ CABG, MI or  angioplasty before 65F 55M? Not Asked   Social History Narrative     Not on file     Social History     Social History Narrative     Not on file          Allergies:  Allergies   Allergen Reactions     Bee Venom Anaphylaxis     Bees      Doxycycline Anaphylaxis     Patient thinks it may have been just nausea and vomiting, however unable to confirm     Erythromycin Anaphylaxis and Shortness Of Breath     Other reaction(s): Vomiting     Hydrocodone-Acetaminophen Itching       Current Medications:   Current Outpatient Medications   Medication Sig Dispense Refill     albuterol (PROAIR HFA, PROVENTIL HFA, VENTOLIN HFA) 108 (90 BASE) MCG/ACT inhaler Inhale 2 puffs into the lungs every 6 hours as needed for shortness of breath / dyspnea or wheezing (PT last dose 1.23.2020)        ARIPiprazole (ABILIFY) 15 MG tablet Take 15 mg by mouth At Bedtime        busPIRone HCl (BUSPAR) 30 MG tablet Take 15 mg by mouth At Bedtime       celecoxib (CELEBREX) 200 MG capsule Take 1 capsule (200 mg) by mouth every morning 60 capsule 4     cetirizine (ZYRTEC) 10 MG tablet Take 1 tablet (10 mg) by mouth daily 69 tablet 3     cholecalciferol ( ULTRA STRENGTH) 2000 units CAPS TAKE ONE CAPSULE BY MOUTH DAILY IN AM       cyclobenzaprine (FLEXERIL) 10 MG tablet TAKE 1 TABLET BY MOUTH EVERYDAY AT BEDTIME 90 tablet 0     DULoxetine (CYMBALTA) 60 MG capsule Take 120 mg by mouth At Bedtime        EPINEPHrine (EPIPEN/ADRENACLICK/OR ANY BX GENERIC EQUIV) 0.3 MG/0.3ML injection 2-pack INJECT 0.3ML INTO THE MUSCLE ONCE AS NEEDED FOR ANAPHYLAXIS       ferrous sulfate (FEROSUL) 325 (65 Fe) MG tablet Take 325 mg by mouth daily (with breakfast)   0     fluconazole (DIFLUCAN) 100 MG tablet Take 2 tabs by mouth on day 1, then 1 tab daily x 14 days. 15 tablet 0     fluconazole (DIFLUCAN) 200 MG tablet TAKE 1 TABLET BY MOUTH EVERY DAY FOR 14 DAYS 14 tablet 0     fluticasone-salmeterol (ADVAIR) 500-50 MCG/DOSE diskus inhaler Inhale 1 puff into the lungs 2  times daily        folic acid (FOLVITE) 1 MG tablet Take 1 tablet (1 mg) by mouth daily 90 tablet 3     lisinopril-hydrochlorothiazide (ZESTORETIC) 20-25 MG tablet Take 1 tablet by mouth daily 90 tablet 1     methotrexate sodium 2.5 MG TABS TAKE 9 TABLETS BY MOUTH ONCE WEEKLY  Labs  past due. appt past due. 108 tablet 5     montelukast (SINGULAIR) 10 MG tablet Take 10 mg by mouth At Bedtime       nystatin (MYCOSTATIN) 029664 UNIT/GM external cream Apply topically 2 times daily 30 g 3     omeprazole (PRILOSEC) 40 MG DR capsule Take 40 mg by mouth as needed (PT last dose appr 2 weeks ago)       OXYGEN-HELIUM IN 2-3L PRN during the day, 3L @ night       pregabalin (LYRICA) 150 MG capsule Take 1 capsule (150 mg) by mouth 2 times daily 60 capsule 3     Respiratory Therapy Supplies ([facility] CPAP FILTER) MISC        rOPINIRole (REQUIP) 0.5 MG tablet Take 1 tablet (0.5 mg) by mouth At Bedtime Take 1 tab by mouth at bedtime. 90 tablet 1     traMADol (ULTRAM) 50 MG tablet TAKE 1   2 TABLETS BY MOUTH EVERY 8 HOURS AS NEEDED FOR MODERATE TO SEVERE PAIN       vitamin C 500 MG TABS Take 500 mg by mouth daily (with lunch)        zinc sulfate (ZINCATE) 220 (50 Zn) MG capsule Take 220 mg by mouth daily (with lunch)             Physical Exam:     LMP  (LMP Unknown)     General Appearance: No distress, standing comfortably  Mood: Euthymic  HE ENT: Non constricted pupils  Respiratory: Non labored breathing      ASSESSMENT AND PLAN:     Encounter Diagnosis:  1.  Right L5 lumbar radiculopathy    2.  Lumbar spondylosis  3. Sacroiliac dysfunction, right  4. S/P epidural abscess with laminectomy and drainage  5. Lateral femoral cutaneous nerve entrapment right       Zayra Ramirez is a 51 year old year old female  who presents to the pain clinic with severe neck pain and worsening low back pain with radiation to the left leg, s/p recent diagnosis of C3 screw fracture    RECOMMENDATIONS:     1. Procedure: We are scheduling the  patient for L5-S1 epidural steroid injection with fluoroscopy in the procedure suite. Risks/benefits/alternatives were discussed.     2.  I reiterated the goals and the importance of smoking cessation.  The patient is interested in exploring resources at the [facility] to help her actively stop smoking.    3.  I discussed with the patient the option of pain psychology.  The patient is interested in exploring this treatment option and a referral will be placed for the same.    Follow up: as needed.

## 2021-06-15 NOTE — PATIENT INSTRUCTIONS
Referrals:    Pain Psychology Referral Placed- We have asked that they contact you to schedule. If you have not heard from their office in 2 buisness days please call [phone] to schedule your appointment.    Smoking Cessation Referral placed- [facility] [place] [phone]    Procedures:    Call to schedule your procedure: [phone], option #2    Lumbar 5 sacral 1 epidural steroid injection with fluoroscopy    Your pre-procedure instructions are below, please call our clinic if you have any questions.      Recommended Follow up:      4-6 weeks after the procedure.        Procedure Information related to COVID-19    Please call [phone] option #2 to schedule, reschedule, or cancel your procedure appointment.   Phones are answered Monday - Friday from 08:00 - 4:30pm.  Leave a voicemail with your name, birth date, and phone number if no one is available to take your call.     You will need to be tested for COVID-19 within 4 days (96 hours) of your procedure.  You will be called to schedule your COVID test by a central scheduling team. If you have not been contacted to schedule your test within 4 days of the scheduled procedure, call [phone]    Please be aware that the turn around time for the test is approximately 24-72 hours.   If your results are still pending the day of your procedure, you will be notified as soon as possible as the procedure may be cancelled.    Please note: You will only be contacted for positive and pending results.     The procedure center staff will call you several days before the procedure to review important information that you will need to know for the day of the procedure.   Please contact the clinic if you have further questions about this information.       Information related to Scheduling and Pre-Procedure Instructions:    If you must reschedule your procedure more than two times, you must follow up in clinic before rescheduling  again.        Preparing for your procedure    CAUTION - FAILURE TO FOLLOW THESE PRE-PROCEDURE INSTRUCTIONS WILL RESULT IN YOUR PROCEDURE BEING RESCHEDULED.      Your procedure: Lumbar Epidural Steroid Injection            You must have a  take you home after your procedure. Transportation by taxi or para-transit is okay as long as you have a responsible adult accompany you. You must provide your 's full name and contact number at time of check in.     Fasting Protocol Please have nothing to eat and drink for 2 hours before the procedure.      Medications If you take any medications, DO NOT STOP. Take your medications as usual the day of your procedure with a sip of water AT LEAST 2 HOURS PRIOR TO ARRIVAL.    Antibiotics If you are currently taking antibiotics, you must complete the entire dose 7 days prior to your scheduled procedure. You must be clear of any signs or symptoms of infection. If you begin antibiotics, please contact our clinic for instructions.     Fever, Chills, or Rash If you experience a fever of higher than 100 degrees, chills, rash, or open wounds during the one week before your procedure, please call the clinic to see if you may proceed with your procedure.      Medication Hold List  **Patients under Cardiology/Neurology care should consult their provider prior to the pain procedure to verify pre-procedure medication instructions. The information below contains general guidelines.**    Blood Thinners If you are taking daily ASPIRIN, PLAVIX, OR OTHER BLOOD THINNERS SUCH AS COUMADIN/WARFARIN, we will need your prescribing doctor to sign a release permitting you to stop these medications. Once approved by your prescribing doctor - STOP ALL BLOOD THINNERS BASED ON THE TIME TABLE BELOW PRIOR TO YOUR PROCEDURE. If you have been instructed to stop WARFARIN(COUMADIN), you must have an INR lab drawn the day before your procedure. . Your INR must be within normal limits before we can  perform your injection. MEDICATIONS CAN BE RESTARTED AFTER YOUR PROCEDURE.    14 DAY HOLD  Ticlid (ticlopidine)    10 DAY HOLD  Effient (Prasugel)    3 DAY HOLD  Xarelto (rivaroxaban) 7 DAY HOLD  Anacin, Bufferin, Ecotrin, Excedrin, Aggrenox (Aspirin)  Brilinta (ticagrelor)  Coumadin (Warfarin)  Pradexa (Dabigatran)  Elmiron (Pentosan)  Plavix (Clopidogrel Bisulfate)  Pletal (Cilostazol)    24 HOUR HOLD  Lovenox (enoxaparin)  Agrylin (Anagrelide)        Non-steroidal Anti-inflammatories (NSAIDs) DO NOT TAKE any non-steroidal anti-inflammatory medications (NSAIDs) listed on the table below. MEDICATIONS CAN BE RESTARTED AFTER YOUR PROCEDURE. Celebrex is OK to take and does not need to be discontinued.     Medications to stop:  3 DAY HOLD  Advil, Motrin (Ibuprofen)  Arthrotec (diciofenac sodium/misoprostol)  Clinoril (Sulindac)  Indocin (Indomethacin)  Lodine (Etodolac)  Toradol (Ketorolac)  Vicoprofen (Hydrocodone and Ibuprofen)  Voltaren (Diclotenac)    14 DAY HOLD  Daypro (Oxaprozin)  Feldene (Piroxicam)   7 DAY HOLD  Aleve (Naproxen sodium)  Darvon compound (contains aspirin)  Naprosyn (Naproxen)  Norgesic Forte (contains aspirin)  Mobic (Meloxicam)  Oruvall (Ketoprofen)  Percodan (contains aspirin)  Relafen (Nabumetone)  Salsalate  Trilisate  Vitamin E (more than 400 mg per day)  Any medication containing aspirin                To speak with a nurse, schedule/reschedule/cancel a clinic appointment, or request a medication refill call: [phone]     You can also reach us by [employer]: [email or web address]

## 2021-06-15 NOTE — PROGRESS NOTES
Zayra is a 51 year old who is being evaluated via a billable video visit.      How would you like to obtain your AVS? MyChart  If the video visit is dropped, the invitation should be resent by: Text to cell phone: [phone]  Will anyone else be joining your video visit? No      Indira Escamilla CMA    Answers for HPI/ROS submitted by the patient on 6/15/2021   General Symptoms: No  Skin Symptoms: No  HENT Symptoms: No  EYE SYMPTOMS: No  HEART SYMPTOMS: No  LUNG SYMPTOMS: No  INTESTINAL SYMPTOMS: No  URINARY SYMPTOMS: No  GYNECOLOGIC SYMPTOMS: No  BREAST SYMPTOMS: No  SKELETAL SYMPTOMS: No  BLOOD SYMPTOMS: No  NERVOUS SYSTEM SYMPTOMS: No  MENTAL HEALTH SYMPTOMS: No

## 2021-06-15 NOTE — LETTER
6/15/2021       RE: Zarya Ramirez  [address] [place] [address]     Dear Colleague,    Thank you for referring your patient, Zayra Ramirez, to the [facility] FOR COMPREHENSIVE PAIN MANAGEMENT [place] at [facility]. Please see a copy of my visit note below.    Zayra is a 51 year old who is being evaluated via a billable video visit.      How would you like to obtain your AVS? MyChart  If the video visit is dropped, the invitation should be resent by: Text to cell phone: [phone]  Will anyone else be joining your video visit? No      Indira Escamilla CMA    Answers for HPI/ROS submitted by the patient on 6/15/2021   General Symptoms: No  Skin Symptoms: No  HENT Symptoms: No  EYE SYMPTOMS: No  HEART SYMPTOMS: No  LUNG SYMPTOMS: No  INTESTINAL SYMPTOMS: No  URINARY SYMPTOMS: No  GYNECOLOGIC SYMPTOMS: No  BREAST SYMPTOMS: No  SKELETAL SYMPTOMS: No  BLOOD SYMPTOMS: No  NERVOUS SYSTEM SYMPTOMS: No  MENTAL HEALTH SYMPTOMS: No          Type of service:  Video Visit    Video Start Time: 8:58 AM    Video End Time:9:10 AM    Originating Location (pt. Location): Home    Distant Location (provider location):  [facility] COMPREHENSIVE PAIN MANAGEMENT [place]     Platform used for Video Visit: [facility]    Pain clinic follow up note    HPI:   Zayra Ramirez is a 51 year old year old female  who presents to the pain clinic with worsening neck pain, low back pain, s/p recetn diagnosis of C3 screw fracture.     Patient Supplied Answers To the [facility] Pain Questionnaire  [facility] Pain -  Patient Entered Questionnaire/Answers 6/15/2021   What number best describes your pain right now:  0 = No pain  to  10 = Worst pain imaginable 4   How would you describe the pain? sharp, dull, aching   Which of the following worsen your pain? sitting   Which of the following improve or reduce your pain?  nothing relieves the pain   What number best  describes your average pain for the past week:  0 = No pain  to  10 = Worst pain imaginable 7   What number best describes your LOWEST pain in past 24 hours:  0 = No pain  to  10 = Worst pain imaginable 4   What number best describes your WORST pain in past 24 hours:  0 = No pain  to  10 = Worst pain imaginable 8   When is your pain worst? Constant   What non-medicine treatments have you already had for your pain? none   Have you tried treating your pain with medication?  No   Are you currently taking medications for your pain? No             Zayra a 51-year-old female presents to clinic today with a past medical history significant for interstitial lung disease, COPD requiring oxygen, CPAP for sleep apnea, reflux hypertension, obesity, history of lumbar surgery, spinal cord stimulator extracted and prior cervical fusion for worsening neck pain and low back pain.  The patient describes acute onset of neck pain and was recently diagnosed with a fractured C3 screw from a past ACDF C3-5.  Surgical treatment is on hold currently as the patient is a smoker.  The patient has been advised that she needs to stop smoking for at least 6 weeks prior to surgery and come back to 12 months of smoking cessation after surgery.  This was reiterated today in the visit.     Because of worsening neck pain the patient held off on low back pain treatments that were discussed at the last visit in March.  She reports low back pain with radiation to the left leg especially in the L5 dermatome worse with transition from seated to a standing position.  She reports that this radiating pain is worsening and becoming more frequent especially a few times a week.  She is able to currently work about 2 hours at a time and is extremely uncomfortable.  During the video visit the patient is noted to be standing and appears uncomfortable.    ROS:  Review of Systems   All other systems reviewed and are negative.    Answers for HPI/ROS submitted by the  patient on 6/15/2021   General Symptoms: No  Skin Symptoms: No  HENT Symptoms: No  EYE SYMPTOMS: No  HEART SYMPTOMS: No  LUNG SYMPTOMS: No  INTESTINAL SYMPTOMS: No  URINARY SYMPTOMS: No  GYNECOLOGIC SYMPTOMS: No  BREAST SYMPTOMS: No  SKELETAL SYMPTOMS: No  BLOOD SYMPTOMS: No  NERVOUS SYSTEM SYMPTOMS: No  MENTAL HEALTH SYMPTOMS: No      Significant Medical History:   Past Medical History:   Diagnosis Date     Allergic rhinitis      Anemia      Arthritis      Asthma     copd     Dental abscess 8-2015     Depressive disorder      Gastroesophageal reflux disease      History of emphysema      Hoarseness      Hypertension      Morbid obesity with BMI of 40.0-44.9, adult (H)      Obstructive sleep apnea      Other chronic pain      Respiratory bronchiolitis interstitial lung disease (H)      Sleep apnea           Past Surgical History:  Past Surgical History:   Procedure Laterality Date     CHOLECYSTECTOMY       COLONOSCOPY       COLONOSCOPY N/A 2/6/2020    Procedure: COLONOSCOPY, WITH POLYPECTOMY AND BIOPSY;  Surgeon: Julian Mccullough MD;  Location:  GI     ENT SURGERY       ESOPHAGOSCOPY, GASTROSCOPY, DUODENOSCOPY (EGD), COMBINED N/A 2/6/2020    Procedure: ESOPHAGOGASTRODUODENOSCOPY (EGD);  Surgeon: Julian Mccullough MD;  Location:  GI     EXCISE LESION INTRAORAL Bilateral 10/3/2018    Procedure: EXCISE LESION INTRAORAL;  Wide Local Excision Of of Left Oral Cavity Ulcer;  Surgeon: Morro Mijares MD;  Location: [facility] OR     HC DRAIN SKIN ABSCESS SIMPLE/SINGLE  3/16/2012    Procedure:INCISION AND DRAINAGE, ABSCESS, SIMPLE; Surgeon:CHRISTIANO HANCOCK; Location: GI     HEAD & NECK SURGERY       HYSTERECTOMY       INCISION AND DRAINAGE ABDOMEN WASHOUT, COMBINED       INJECT EPIDURAL LUMBAR Right 9/15/2020    Procedure: Lumbar5- sacral 1 epidural steroid injection with fluoroscopy;  Surgeon: Tram Florian MD;  [facility]     INJECT SACROILIAC JOINT Bilateral 6/16/2020    Procedure: Bilateral  sacroiliac joint steroid injection with fluoroscopy;  Surgeon: Tram Florian MD;  [facility]     LAMINECTOMY THORACIC ONE LEVEL N/A 8/19/2019    Procedure: LAMINECTOMY, SPINE, THORACIC, 11-12 and Part of Lumbar 1, DRAINAGE OF EPIDURAL ABCESS, Epidural Drain Placement X 2;  Surgeon: Yadiel Beal MD;  Location: [facility] OR     RADIO FREQUENCY ABLATION / DESTRUCTION OF SACROILOAC JOINT DORSAL PRIMARY RAMUS Bilateral 12/17/2019    Procedure: Bilateral lumbar radiofrequency ablation with fluoroscopy and intravenous sedation ( Lumbar 2,3,4,5 medial branch nerves for the bilateral lumbar3-4, 4-5 and 5-sacral1 joints.;  Surgeon: Tram Florian MD;  [facility]     RADIO FREQUENCY ABLATION / DESTRUCTION OF SACROILOAC JOINT DORSAL PRIMARY RAMUS Right 11/17/2020    Procedure: Right lumbar medial branch nerve radiofrequency ablation right L2,3,4,5 nerves supplying the right L3-4, L4-5 and L5-S1 facet joints;  Surgeon: Tram Florian MD;  [facility] OR     spinal cord stimulator  08/08/2019     spinal cord stimulator removal  08/13/2019          Family History:  Family History   Problem Relation Age of Onset     Other Cancer Father         base of tongue cancer at age ~65     Hypertension Father      Back Pain Father      Restless Leg Syndrome Father      Asthma Mother      Restless Leg Syndrome Mother      Restless Leg Syndrome Sister      Breast Cancer Maternal Aunt 55          Social History:  Social History     Socioeconomic History     Marital status: Single     Spouse name: Not on file     Number of children: Not on file     Years of education: Not on file     Highest education level: Not on file   Occupational History     Not on file   Social Needs     Financial resource strain: Not on file     Food insecurity     Worry: Not on file     Inability: Not on file     Transportation needs     Medical: Not on file     Non-medical: Not on file   Tobacco Use     Smoking status: Current Every Day Smoker      Packs/day: 1.00     Years: 30.00     Pack years: 30.00     Types: Cigarettes     Start date: 1/1/1996     Smokeless tobacco: Never Used     Tobacco comment: has tried the patch   Substance and Sexual Activity     Alcohol use: No     Binge frequency: Monthly     Drug use: Yes     Types: Marijuana     Comment: very rarely      Sexual activity: Never   Lifestyle     Physical activity     Days per week: Not on file     Minutes per session: Not on file     Stress: Not on file   Relationships     Social connections     Talks on phone: Not on file     Gets together: Not on file     Attends Bahai service: Not on file     Active member of club or organization: Not on file     Attends meetings of clubs or organizations: Not on file     Relationship status: Not on file     Intimate partner violence     Fear of current or ex partner: Not on file     Emotionally abused: Not on file     Physically abused: Not on file     Forced sexual activity: Not on file   Other Topics Concern     Parent/sibling w/ CABG, MI or angioplasty before 65F 55M? Not Asked   Social History Narrative     Not on file     Social History     Social History Narrative     Not on file          Allergies:  Allergies   Allergen Reactions     Bee Venom Anaphylaxis     Bees      Doxycycline Anaphylaxis     Patient thinks it may have been just nausea and vomiting, however unable to confirm     Erythromycin Anaphylaxis and Shortness Of Breath     Other reaction(s): Vomiting     Hydrocodone-Acetaminophen Itching       Current Medications:   Current Outpatient Medications   Medication Sig Dispense Refill     albuterol (PROAIR HFA, PROVENTIL HFA, VENTOLIN HFA) 108 (90 BASE) MCG/ACT inhaler Inhale 2 puffs into the lungs every 6 hours as needed for shortness of breath / dyspnea or wheezing (PT last dose 1.23.2020)        ARIPiprazole (ABILIFY) 15 MG tablet Take 15 mg by mouth At Bedtime        busPIRone HCl (BUSPAR) 30 MG tablet Take 15 mg by mouth At Bedtime        celecoxib (CELEBREX) 200 MG capsule Take 1 capsule (200 mg) by mouth every morning 60 capsule 4     cetirizine (ZYRTEC) 10 MG tablet Take 1 tablet (10 mg) by mouth daily 69 tablet 3     cholecalciferol ( ULTRA STRENGTH) 2000 units CAPS TAKE ONE CAPSULE BY MOUTH DAILY IN AM       cyclobenzaprine (FLEXERIL) 10 MG tablet TAKE 1 TABLET BY MOUTH EVERYDAY AT BEDTIME 90 tablet 0     DULoxetine (CYMBALTA) 60 MG capsule Take 120 mg by mouth At Bedtime        EPINEPHrine (EPIPEN/ADRENACLICK/OR ANY BX GENERIC EQUIV) 0.3 MG/0.3ML injection 2-pack INJECT 0.3ML INTO THE MUSCLE ONCE AS NEEDED FOR ANAPHYLAXIS       ferrous sulfate (FEROSUL) 325 (65 Fe) MG tablet Take 325 mg by mouth daily (with breakfast)   0     fluconazole (DIFLUCAN) 100 MG tablet Take 2 tabs by mouth on day 1, then 1 tab daily x 14 days. 15 tablet 0     fluconazole (DIFLUCAN) 200 MG tablet TAKE 1 TABLET BY MOUTH EVERY DAY FOR 14 DAYS 14 tablet 0     fluticasone-salmeterol (ADVAIR) 500-50 MCG/DOSE diskus inhaler Inhale 1 puff into the lungs 2 times daily        folic acid (FOLVITE) 1 MG tablet Take 1 tablet (1 mg) by mouth daily 90 tablet 3     lisinopril-hydrochlorothiazide (ZESTORETIC) 20-25 MG tablet Take 1 tablet by mouth daily 90 tablet 1     methotrexate sodium 2.5 MG TABS TAKE 9 TABLETS BY MOUTH ONCE WEEKLY  Labs  past due. appt past due. 108 tablet 5     montelukast (SINGULAIR) 10 MG tablet Take 10 mg by mouth At Bedtime       nystatin (MYCOSTATIN) 398411 UNIT/GM external cream Apply topically 2 times daily 30 g 3     omeprazole (PRILOSEC) 40 MG DR capsule Take 40 mg by mouth as needed (PT last dose appr 2 weeks ago)       OXYGEN-HELIUM IN 2-3L PRN during the day, 3L @ night       pregabalin (LYRICA) 150 MG capsule Take 1 capsule (150 mg) by mouth 2 times daily 60 capsule 3     Respiratory Therapy Supplies ([facility] CPAP FILTER) MISC        rOPINIRole (REQUIP) 0.5 MG tablet Take 1 tablet (0.5 mg) by mouth At Bedtime Take 1 tab by mouth at  bedtime. 90 tablet 1     traMADol (ULTRAM) 50 MG tablet TAKE 1   2 TABLETS BY MOUTH EVERY 8 HOURS AS NEEDED FOR MODERATE TO SEVERE PAIN       vitamin C 500 MG TABS Take 500 mg by mouth daily (with lunch)        zinc sulfate (ZINCATE) 220 (50 Zn) MG capsule Take 220 mg by mouth daily (with lunch)             Physical Exam:     LMP  (LMP Unknown)     General Appearance: No distress, standing comfortably  Mood: Euthymic  HE ENT: Non constricted pupils  Respiratory: Non labored breathing      ASSESSMENT AND PLAN:     Encounter Diagnosis:  1.  Right L5 lumbar radiculopathy    2.  Lumbar spondylosis  3. Sacroiliac dysfunction, right  4. S/P epidural abscess with laminectomy and drainage  5. Lateral femoral cutaneous nerve entrapment right       Zayra Ramirez is a 51 year old year old female  who presents to the pain clinic with severe neck pain and worsening low back pain with radiation to the left leg, s/p recent diagnosis of C3 screw fracture    RECOMMENDATIONS:     1. Procedure: We are scheduling the patient for L5-S1 epidural steroid injection with fluoroscopy in the procedure suite. Risks/benefits/alternatives were discussed.     2.  I reiterated the goals and the importance of smoking cessation.  The patient is interested in exploring resources at the [facility] to help her actively stop smoking.    3.  I discussed with the patient the option of pain psychology.  The patient is interested in exploring this treatment option and a referral will be placed for the same.    Follow up: as needed.                  Again, thank you for allowing me to participate in the care of your patient.      Sincerely,    Tram Florian MD

## 2021-06-15 NOTE — NURSING NOTE
LPN read through the instructions with pt for the recommended procedure: Lumbar EDUARDO  Pt verbalized understanding to holding appropriate medication per protocol, and was agreeable to NPO policy and needing a .    Anticoagulant: None, Pt denied    Recommended follow up: 4-6 weeks after the procedure.     Doreen Colbert LPN

## 2021-06-16 ENCOUNTER — TELEPHONE (OUTPATIENT)
Dept: INTERNAL MEDICINE | Facility: CLINIC | Age: 51
End: 2021-06-16

## 2021-06-16 NOTE — TELEPHONE ENCOUNTER
[facility] Call Center    Phone Message    May a detailed message be left on voicemail: yes     Reason for Call: Other:     Zayra calling in -  She has an appt with ENT on 8/3/21.  Wondering if Neena Alexeiasafscott will be able to treat her again before her ENT appt?   Please call her back to discuss.           Action Taken: Message routed to:  [facility] ([facility]    Travel Screening: Not Applicable

## 2021-06-17 DIAGNOSIS — B37.0 THRUSH: ICD-10-CM

## 2021-06-17 DIAGNOSIS — B37.0 THRUSH: Primary | ICD-10-CM

## 2021-06-17 RX ORDER — FLUCONAZOLE 100 MG/1
TABLET ORAL
Qty: 15 TABLET | Refills: 0 | Status: SHIPPED | OUTPATIENT
Start: 2021-06-17 | End: 2021-09-21

## 2021-06-18 ENCOUNTER — HOSPITAL ENCOUNTER (OUTPATIENT)
Dept: MAMMOGRAPHY | Facility: CLINIC | Age: 51
Discharge: HOME | End: 2021-06-18
Attending: NURSE PRACTITIONER | Admitting: NURSE PRACTITIONER
Payer: COMMERCIAL

## 2021-06-18 DIAGNOSIS — Z12.31 ENCOUNTER FOR SCREENING MAMMOGRAM FOR BREAST CANCER: ICD-10-CM

## 2021-06-18 PROCEDURE — 77067 SCR MAMMO BI INCL CAD: CPT

## 2021-06-18 NOTE — TELEPHONE ENCOUNTER
I called and left message letting her know med sent but  Should keep ENT appt.  Emely Santos, EMT at 6:31 PM on 6/18/2021.

## 2021-06-22 ENCOUNTER — TELEPHONE (OUTPATIENT)
Dept: RHEUMATOLOGY | Facility: CLINIC | Age: 51
End: 2021-06-22

## 2021-06-22 NOTE — TELEPHONE ENCOUNTER
[facility] Call Center    Phone Message    May a detailed message be left on voicemail: yes     Reason for Call: Medication Question or concern regarding medication   Prescription Clarification  Name of Medication: Methotrexate and CELEBREX    Prescribing Provider: Dr. Saenz   Pharmacy: [facility] in [place] on [address]     What on the order needs clarification? Pt says her pharmacy states there is a medication interaction with the Methotrexate and Celebrex.  Pt is wondering what Dr. Saenz's thoughts are with the two drug interaction?  Pt would like a call back to discuss this.            Action (Routing): [facility]: Adult Rheumatology

## 2021-06-22 NOTE — TELEPHONE ENCOUNTER
Thank you for the question.  With rheumatic disease doses of methotrexate (25 mg/week or less), the likelihood of a clinically important between Celebrex and methotrexate is remote.  I recommend monitoring for methotrexate toxicity with every 12-week CBC, creatinine, and transaminase measurements, but the 2 medications may be safely used together with that monitoring scheme in place.

## 2021-06-23 NOTE — TELEPHONE ENCOUNTER
Call placed to patient regarding Dr. Saenz's message below. Left patient a message to return call. When patient calls back, please reach Rheumatology staff via back line. [employer] message sent to patient of detailed message below. Patient informed to call clinic back with further questions.     Ellie Patel CMA   6/23/2021 12:37 PM

## 2021-06-24 ENCOUNTER — DOCUMENTATION ONLY (OUTPATIENT)
Dept: NEUROSURGERY | Facility: CLINIC | Age: 51
End: 2021-06-24

## 2021-06-24 ENCOUNTER — MYC MEDICAL ADVICE (OUTPATIENT)
Dept: RHEUMATOLOGY | Facility: CLINIC | Age: 51
End: 2021-06-24

## 2021-06-24 NOTE — TELEPHONE ENCOUNTER
Call placed to patient without success. Left message for patient to call back for message below. When patient calls back, please alert Rheumatology staff via back line.     [employer] message of this detailed information has been sent to patient and let patient know to give us a call should she have any questions.     Ellie Patel CMA   6/24/2021 1:35 PM

## 2021-06-24 NOTE — TELEPHONE ENCOUNTER
Patient is returning a call from Ellie, she can be reached at [phone], okay to leave a detailed message.

## 2021-06-25 DIAGNOSIS — Z11.59 ENCOUNTER FOR SCREENING FOR OTHER VIRAL DISEASES: ICD-10-CM

## 2021-06-25 LAB
LABORATORY COMMENT REPORT: NORMAL
SARS-COV-2 RNA RESP QL NAA+PROBE: NEGATIVE
SARS-COV-2 RNA RESP QL NAA+PROBE: NORMAL
SPECIMEN SOURCE: NORMAL
SPECIMEN SOURCE: NORMAL

## 2021-06-25 PROCEDURE — U0003 INFECTIOUS AGENT DETECTION BY NUCLEIC ACID (DNA OR RNA); SEVERE ACUTE RESPIRATORY SYNDROME CORONAVIRUS 2 (SARS-COV-2) (CORONAVIRUS DISEASE [COVID-19]), AMPLIFIED PROBE TECHNIQUE, MAKING USE OF HIGH THROUGHPUT TECHNOLOGIES AS DESCRIBED BY CMS-2020-01-R: HCPCS | Performed by: ANESTHESIOLOGY

## 2021-06-25 PROCEDURE — U0005 INFEC AGEN DETEC AMPLI PROBE: HCPCS | Performed by: ANESTHESIOLOGY

## 2021-06-29 ENCOUNTER — ANCILLARY PROCEDURE (OUTPATIENT)
Dept: RADIOLOGY | Facility: AMBULATORY SURGERY CENTER | Age: 51
End: 2021-06-29
Attending: ANESTHESIOLOGY
Payer: COMMERCIAL

## 2021-06-29 ENCOUNTER — HOSPITAL ENCOUNTER (OUTPATIENT)
Facility: AMBULATORY SURGERY CENTER | Age: 51
Discharge: HOME | End: 2021-06-29
Attending: ANESTHESIOLOGY | Admitting: ANESTHESIOLOGY
Payer: COMMERCIAL

## 2021-06-29 VITALS
BODY MASS INDEX: 37.9 KG/M2 | HEIGHT: 64 IN | OXYGEN SATURATION: 98 % | TEMPERATURE: 97.5 F | WEIGHT: 222 LBS | RESPIRATION RATE: 20 BRPM | DIASTOLIC BLOOD PRESSURE: 83 MMHG | SYSTOLIC BLOOD PRESSURE: 131 MMHG | HEART RATE: 68 BPM

## 2021-06-29 DIAGNOSIS — R52 PAIN: ICD-10-CM

## 2021-06-29 DIAGNOSIS — M54.16 LUMBAR RADICULOPATHY, CHRONIC: ICD-10-CM

## 2021-06-29 PROCEDURE — 62323 NJX INTERLAMINAR LMBR/SAC: CPT

## 2021-06-29 RX ORDER — LIDOCAINE HYDROCHLORIDE 10 MG/ML
INJECTION, SOLUTION EPIDURAL; INFILTRATION; INTRACAUDAL; PERINEURAL PRN
Status: DISCONTINUED | OUTPATIENT
Start: 2021-06-29 | End: 2021-06-29 | Stop reason: HOSPADM

## 2021-06-29 RX ORDER — IOPAMIDOL 408 MG/ML
INJECTION, SOLUTION INTRATHECAL PRN
Status: DISCONTINUED | OUTPATIENT
Start: 2021-06-29 | End: 2021-06-29 | Stop reason: HOSPADM

## 2021-06-29 RX ORDER — BUPIVACAINE HYDROCHLORIDE 2.5 MG/ML
INJECTION, SOLUTION EPIDURAL; INFILTRATION; INTRACAUDAL PRN
Status: DISCONTINUED | OUTPATIENT
Start: 2021-06-29 | End: 2021-06-29 | Stop reason: HOSPADM

## 2021-06-29 RX ORDER — METHYLPREDNISOLONE ACETATE 40 MG/ML
INJECTION, SUSPENSION INTRA-ARTICULAR; INTRALESIONAL; INTRAMUSCULAR; SOFT TISSUE PRN
Status: DISCONTINUED | OUTPATIENT
Start: 2021-06-29 | End: 2021-06-29 | Stop reason: HOSPADM

## 2021-06-29 ASSESSMENT — MIFFLIN-ST. JEOR: SCORE: 1608.02

## 2021-06-29 NOTE — DISCHARGE INSTRUCTIONS
Home Care Instructions after an Epidural Steroid Pain Injection    A lumbar epidural steroid injection delivers steroid medication directly into the area that may be causing your lower back pain and/or leg pain. A cervical or thoracic epidural steroid injection delivers steroids into the epidural space surrounding spinal nerve roots to help relieve pain in the upper spine/neck.    Activity  -Rest today  -Do not work today  -Resume normal activity tomorrow  -DO NOT shower for 24 hours  -DO NOT remove bandaid for 24 hours    Pain  -You may experience soreness at the injection site for one or two days  -You may use an ice pack for 20 minutes every 2 hours for the first 24 hours  -You may use a heating pad after the first 24 hours  -You may use Tylenol (acetaminophen) every 4 hours or other pain medicines as     directed by your physician    You may experience numbness radiating into your legs or arms (depending on the procedure location). This numbness may last several hours. Until sensation returns to normal; please use caution in walking, climbing stairs, and stepping out of your vehicle, etc.    Common side effects of steroids:  Not everyone will experience corticosteroid side effects. If side effects are experienced, they will gradually subside in the 7-10 day period following an injection. Most common side effects include:  -Flushed face and/or chest  -Feeling of warmth, particularly in the face but could be an overall feeling of warmth  -Increased blood sugar in diabetic patients  -Menstrual irregularities my occur. If taking hormone-based birth control an alternate method of birth control is recommended  -Sleep disturbances and/or mood swings are possible  -Leg cramps    Please contact us if you have:  -Severe pain  -Fever more than 101.5 degrees Fahrenheit  -Signs of infection at the injection site (redness, swelling, or drainage)    If you have questions, please contact our office at [phone] between the  hours of 7:00 am and 3:00 pm Monday through Friday. After office hours you can contact the on call provider by dialing [phone]. If you need immediate attention, we recommend that you go to a hospital emergency room or dial [phone].

## 2021-06-29 NOTE — PROCEDURES
Lumbar Epidural Steroid Injection    Procedure:  1. Lumbar epidural steroid injection at right L5-S1  2. Fluoroscopy for needle guidance    Pre-operative Diagnosis:  1. Intervertebral disc disorders w radiculopathy, lumbosacral region  2. Other intervertebral disc degeneration, lumbosacral region    Post-Operative Diagnosis:  1. Intervertebral disc disorders w radiculopathy, lumbosacral region  2. Other intervertebral disc degeneration, lumbosacral region    Anesthesia:  Local anesthesia    Medical Indications:  The patient presents to the ambulatory surgery center today for the treatment of persistent pain. We believe that the pain is spinally mediated. The patient has been exhibiting symptoms consistent with lumbar intraspinal inflammation and radiculopathy. Symptoms have been persistent, disabling and intermittently severe. The patient has been evaluated in the pain clinic and scheduled today for a spinal injection to aid in the diagnosis and treatment of presumed spinal pain. The risks, benefits, potential complications, and alternatives were discussed with the patient as per the signed consent form, and informed consent was obtained. The patient has received information about the procedure and its risks. The physician personally confirmed the procedure, side, and site to be injected with the patient and marked the site in the pre-procedure area.    Description of Procedure:  An additional intraoperative timeout, specifically to confirm accurate localization, was conducted by the attending physician. The patient remained awake and alert throughout the procedure. The patient was placed in the prone position. The skin was prepped with chlorhexidine and sterile drapes were applied. The skin and soft tissues were anesthetized with lidocaine 1%. A 22 gauge 3.5 inch touhy needle epidural needle was inserted percutaneously and advanced under fluoroscopic guidance using AP, and lateral projections. Using loss of  resistance to air and a right sided interlaminar approach, the L5/S1 posterior epidural space was entered after the lamina was contacted with the epidural needle. No paraesthesias occurred and aspiration was negative. Epidurography and radiographic interpretation: Epidurography was performed by injection of Isovue 200 under live fluoroscopy. Multiple Xray images were obtained. Radiologic examination confirmed proper spread of the contrast medium within the epidural space. There was no evidence of intrathecal or intravascular runoff. The needle was injected with 40mg methylprednisolone mixed with 3 ml of 0.25% bupivacaine. The needle was removed after flushing with 1% lidocaine. A sterile bandage was applied. The patient did not experience any hemodynamic or neurologic sequelae. The patient was transferred to the recovery area. Post operative instructions were explained and given to the patient.    Complications:  No procedural or immediate post-procedural complications occurred and the patient was transferred to PACU in stable condition.  Procedure Images:    Post-Operative Plan:  The patient will monitor pain relief from today's procedure. They will call the clinic for any problems related to today's procedure. A copy of our written post-procedure instructions was provided. They will return to clinic as scheduled.

## 2021-07-02 NOTE — TELEPHONE ENCOUNTER
Spoke with patient and gave her Dr. Saenz's recommendation below. Patient denied further questions at this time.     Ellie Patel, GRETA   7/2/2021 2:27 PM

## 2021-07-08 ASSESSMENT — ANXIETY QUESTIONNAIRES
5. BEING SO RESTLESS THAT IT IS HARD TO SIT STILL: SEVERAL DAYS
3. WORRYING TOO MUCH ABOUT DIFFERENT THINGS: MORE THAN HALF THE DAYS
IF YOU CHECKED OFF ANY PROBLEMS ON THIS QUESTIONNAIRE, HOW DIFFICULT HAVE THESE PROBLEMS MADE IT FOR YOU TO DO YOUR WORK, TAKE CARE OF THINGS AT HOME, OR GET ALONG WITH OTHER PEOPLE: VERY DIFFICULT
7. FEELING AFRAID AS IF SOMETHING AWFUL MIGHT HAPPEN: SEVERAL DAYS
2. NOT BEING ABLE TO STOP OR CONTROL WORRYING: NEARLY EVERY DAY
6. BECOMING EASILY ANNOYED OR IRRITABLE: MORE THAN HALF THE DAYS
1. FEELING NERVOUS, ANXIOUS, OR ON EDGE: MORE THAN HALF THE DAYS
GAD7 TOTAL SCORE: 14

## 2021-07-08 ASSESSMENT — PATIENT HEALTH QUESTIONNAIRE - PHQ9
SUM OF ALL RESPONSES TO PHQ QUESTIONS 1-9: 16
5. POOR APPETITE OR OVEREATING: NEARLY EVERY DAY

## 2021-07-10 ENCOUNTER — HOSPITAL ENCOUNTER (OUTPATIENT)
Dept: LAB | Facility: CLINIC | Age: 51
Discharge: HOME | End: 2021-07-10
Attending: INTERNAL MEDICINE | Admitting: INTERNAL MEDICINE
Payer: COMMERCIAL

## 2021-07-10 DIAGNOSIS — F17.200 SMOKER: ICD-10-CM

## 2021-07-10 DIAGNOSIS — Z79.899 HIGH RISK MEDICATION USE: ICD-10-CM

## 2021-07-10 DIAGNOSIS — M19.90 INFLAMMATORY ARTHRITIS: ICD-10-CM

## 2021-07-10 DIAGNOSIS — G06.1 SPINAL EPIDURAL ABSCESS: ICD-10-CM

## 2021-07-10 LAB
ALBUMIN SERPL-MCNC: 3.3 G/DL (ref 3.4–5)
ALT SERPL W P-5'-P-CCNC: 21 U/L (ref 0–50)
AST SERPL W P-5'-P-CCNC: 15 U/L (ref 0–45)
CREAT SERPL-MCNC: 1.01 MG/DL (ref 0.52–1.04)
CRP SERPL-MCNC: 17.4 MG/L (ref 0–8)
ERYTHROCYTE [DISTWIDTH] IN BLOOD BY AUTOMATED COUNT: 15.3 % (ref 10–15)
GFR SERPL CREATININE-BSD FRML MDRD: 65 ML/MIN/{1.73_M2}
HCT VFR BLD AUTO: 37.8 % (ref 35–47)
HGB BLD-MCNC: 11.9 G/DL (ref 11.7–15.7)
MCH RBC QN AUTO: 32.5 PG (ref 26.5–33)
MCHC RBC AUTO-ENTMCNC: 31.5 G/DL (ref 31.5–36.5)
MCV RBC AUTO: 103 FL (ref 78–100)
PLATELET # BLD AUTO: 255 10E9/L (ref 150–450)
RBC # BLD AUTO: 3.66 10E12/L (ref 3.8–5.2)
WBC # BLD AUTO: 7.1 10E9/L (ref 4–11)

## 2021-07-10 PROCEDURE — 36415 COLL VENOUS BLD VENIPUNCTURE: CPT | Performed by: INTERNAL MEDICINE

## 2021-07-10 PROCEDURE — 82565 ASSAY OF CREATININE: CPT | Performed by: INTERNAL MEDICINE

## 2021-07-10 PROCEDURE — 84450 TRANSFERASE (AST) (SGOT): CPT | Performed by: INTERNAL MEDICINE

## 2021-07-10 PROCEDURE — 84460 ALANINE AMINO (ALT) (SGPT): CPT | Performed by: INTERNAL MEDICINE

## 2021-07-10 PROCEDURE — 85027 COMPLETE CBC AUTOMATED: CPT | Performed by: INTERNAL MEDICINE

## 2021-07-10 PROCEDURE — 86140 C-REACTIVE PROTEIN: CPT | Performed by: INTERNAL MEDICINE

## 2021-07-10 PROCEDURE — 82040 ASSAY OF SERUM ALBUMIN: CPT | Performed by: INTERNAL MEDICINE

## 2021-07-14 NOTE — TELEPHONE ENCOUNTER
CYCLOBENZAPRINE 10 MG TABLET     Last Written Prescription Date:  4/12/21  Last Fill Quantity: 90,   # refills: 0  Last Office Visit : 6/10/21  Future Office visit:  none    Routing refill request to provider for review/approval because:  Drug not on the [facility] or [facility] refill protocol or controlled substance  Thank-you, Thalia PADRON RN Medication Refill Team

## 2021-07-15 ENCOUNTER — VIRTUAL VISIT (OUTPATIENT)
Dept: GASTROENTEROLOGY | Facility: CLINIC | Age: 51
End: 2021-07-15
Payer: COMMERCIAL

## 2021-07-15 VITALS — BODY MASS INDEX: 38.41 KG/M2 | WEIGHT: 225 LBS | HEIGHT: 64 IN

## 2021-07-15 DIAGNOSIS — K21.01 GASTROESOPHAGEAL REFLUX DISEASE WITH ESOPHAGITIS AND HEMORRHAGE: Primary | ICD-10-CM

## 2021-07-15 DIAGNOSIS — K21.9 GASTROESOPHAGEAL REFLUX DISEASE, UNSPECIFIED WHETHER ESOPHAGITIS PRESENT: ICD-10-CM

## 2021-07-15 PROCEDURE — 99214 OFFICE O/P EST MOD 30 MIN: CPT | Mod: 95 | Performed by: PHYSICIAN ASSISTANT

## 2021-07-15 RX ORDER — OMEPRAZOLE 40 MG/1
40 CAPSULE, DELAYED RELEASE ORAL PRN
Qty: 180 CAPSULE | Refills: 3 | Status: SHIPPED | OUTPATIENT
Start: 2021-07-15 | End: 2021-07-22

## 2021-07-15 RX ORDER — CYCLOBENZAPRINE HCL 10 MG
10 TABLET ORAL DAILY
Qty: 90 TABLET | Refills: 1 | Status: SHIPPED | OUTPATIENT
Start: 2021-07-15 | End: 2021-12-22

## 2021-07-15 ASSESSMENT — MIFFLIN-ST. JEOR: SCORE: 1625.59

## 2021-07-15 ASSESSMENT — PAIN SCALES - GENERAL: PAINLEVEL: NO PAIN (0)

## 2021-07-15 NOTE — LETTER
7/15/2021         RE: Zayra Ramirez  [address] [place] [address]        Dear Colleague,    Thank you for referring your patient, Zayra Ramirez, to the [facility] [place]. Please see a copy of my visit note below.    GI CLINIC VISIT    CC/REFERRING MD:  Neena Tam  REASON FOR CONSULTATION: abdominal pain    ASSESSMENT/PLAN:  49-year-old female with past medical history to include depression, GERD (on omeprazole, unsure dose), rheumatoid arthritis on methotrexate weekly and folic acid daily not currently on prednisone, hypertension on lisinopril, interstitial lung disease, cervical spinal stenosis presents to the GI clinic for follow up regarding epigastric abdominal pain.    1. GERD w/ excess belching: Patient had originally presented with epigastric discomfort ultimately undergoing both a colonoscopy and upper endoscopy.  EGD in Feb 2020 revealed LA grade a esophagitis.  She was instructed to maintain omeprazole 40 mg daily.  Her most bothersome symptom is the ongoing belching and I do not think that increasing her PPI dosage is going to be helpful in eliminating this symptom.  There are several lifestyle factors that could also be aggravating such as 5-6 cans of Mountain Dew's a day and continuing to smoke.  She also uses an inhaler and currently has thrush that is being treated.  I have suggested to primarily focus on lifestyle modifications to decrease bloat, minimizing her Mountain Dew intake, and work on smoking cessation.  --  Continue prilosec 40 mg per day, take 30-60 before any oral intake  - Symptom and diet journaling  - Recommend low gas diet  - Recommend lifestyle changes to limit aerophagia (no carbonated beverages, no straws, no gum, no hard candy), eat slowly  - Recommend increased activity/exercise - walking 10 minutes a day three times a day to start    2. Thrush: She is currently being treated with Diflucan.  It will be very  important for her to practice proper oral hygiene in regards to her inhalers to minimize this from recurring.    3.  Loose stool: Also likely suggestive of ongoing excessive caffeine intake with 4 Mountain Dew's a day.  Colonoscopy last year was unrevealing.  I have suggested to incorporate soluble fiber supplementation and to minimize soda pop intake.  -- Recommend soluble fiber supplementation on a daily basis (Metamucil, citrucel or benefiber).  Start with 1 tablespoon per day and if tolerated, may increase up to 2-3 tablespoons per day.  You may experience some bloating with initiation of fiber supplementation that will improve over the first month.  A good fiber trial to evaluate the effect is 3-6 months.    4. Colorectal cancer screening: History of adenomatous polyp in the past.  Normal colonoscopy 2020, one polyp resected in the sigmoid shown as a tubular adenoma.  No evidence of high-grade dysplasia or invasive malignancy.    RTC 6 months or as needed    Thank you for this consultation.  30 minutes spent on the date of the encounter doing chart review, history and exam, documentation and further activities as noted above.  It was a pleasure to participate in the care of this patient; please contact us with any further questions.      Abhi Villafuerte PA-C  Division of Gastroenterology, Hepatology and Nutrition  [facility]        HPI  49-year-old female with past medical history to include depression, GERD (on omeprazole, unsure dose), seronegative rheumatoid arthritis on methotrexate weekly and folic acid daily not currently on prednisone, hypertension on lisinopril, interstitial lung disease, cervical spinal stenosis presents to the GI clinic for consultation regarding epigastric abdominal pain.  Patient describes his pain has been going on for the last 2 weeks and wakes her up at night.  She feels that the pain gets worse throughout the day.  She describes it as a numb feeling inside her abdomen.   She can reproduce the pain with palpation.  Pain is also worsened by coughing or moving.  Rest sometimes will improve the pain.  She denies any nausea, vomiting or dysphasia accompanied by this pain.  Bowel movements are average 3 formed to soft stools per day, [place] 4-5.  She denies any blood in the stool.  She had a normal colonoscopy in 2015 and recommended to repeat in 5 years.  I do note that she admits to high caffeine intake with 6 Mountain Dew drinks per day.  She smokes 1 pack/day.    Interval hx 1/8/20:  Pain is still present, however is decreased in severity and frequency.  Still no change with BMs or oral intake. Not keeping up at night. Has some breakthrough reflux and reports she has restarted omeprazole, but unsure dose. No dysphagia. Takes naprosyn BID for back pain and Cymbalta daily.     Interval hx 7/14/21  Has thrush currently (since end of February). Has belching all the time and is having a hard time getting all of the air out. When she does belching, it is very loud. She is still drinks 4 mountain dew per day. She is having 3-4 stools per day that are loose (water to mashed potatoes) and has urgency.  No blood in the stool. She continues to use albuterol inhaler but no longer on steroid inhaler.      ROS:    No fevers or chills  No weight loss  No blurry vision, double vision or change in vision  No sore throat  No lymphadenopathy  No headache, paraesthesias, or weakness in a limb  No shortness of breath or wheezing  No chest pain or pressure  No arthralgias or myalgias  No rashes or skin changes  No odynophagia or dysphagia  No BRBPR, hematochezia, melena  No dysuria, frequency or urgency  No hot/cold intolerance or polyria  + anxiety or depression    PROBLEM LIST  Patient Active Problem List    Diagnosis Date Noted     Morbid (severe) obesity due to excess calories (H) 08/08/2015     Priority: High     Lumbar radiculopathy, chronic 06/15/2021     Priority: Medium     Added automatically  from request for surgery [id number]       Chronic neck pain 05/19/2021     Priority: Medium     Glucose intolerance 04/10/2021     Priority: Medium     Chronic lumbar radiculopathy 03/18/2021     Priority: Medium     Added automatically from request for surgery [id number]       Degenerative lumbar spinal stenosis 08/31/2020     Priority: Medium     Added automatically from request for surgery [id number]       Sacro-iliac pain 06/09/2020     Priority: Medium     Added automatically from request for surgery [id number]       Calculus of gallbladder without cholecystitis without obstruction 01/21/2020     Priority: Medium     Symptomatic cholelithiasis 01/21/2020     Priority: Medium     Inflammatory arthritis 11/27/2019     Priority: Medium     Lumbar spondylosis 11/04/2019     Priority: Medium     Added automatically from request for surgery [id number]       Spinal epidural abscess 08/19/2019     Priority: Medium     Respiratory bronchiolitis interstitial lung disease (H)      Priority: Medium     Subcutaneous emphysema (H) 04/05/2018     Priority: Medium     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) 10/13/2017     Priority: Medium     Oral lesion 02/14/2017     Priority: Medium     Interstitial lung disease (H) 11/17/2016     Priority: Medium     Overview:   Patient sees Pulm       Derangement of temporomandibular joint 11/15/2016     Priority: Medium     Displacement of articular disc of temporomandibular joint with reduction 11/15/2016     Priority: Medium     Arthralgia of temporomandibular joint 09/20/2016     Priority: Medium     Articular disc disorder of temporomandibular joint 09/20/2016     Priority: Medium     Myofascial pain 09/20/2016     Priority: Medium     Esophageal stricture 07/18/2016     Priority: Medium     Overview:   With Hiatal hernia; on Acid blocker lifelong       Stress 06/15/2016     Priority: Medium     Hypoxia 06/08/2016     Priority: Medium     Onychomycosis 06/01/2016     Priority: Medium      Restless leg syndrome 06/01/2016     Priority: Medium     Smoker 11/02/2015     Priority: Medium     Dental abscess 08/08/2015     Priority: Medium     Facial cellulitis 03/05/2015     Priority: Medium     Chronic midline low back pain 11/10/2014     Priority: Medium     Diagnosis updated by automated process. Provider to review and confirm.       HTN (hypertension) 01/28/2013     Priority: Medium     Borderline personality disorder (H) 01/12/2011     Priority: Medium     GERD (gastroesophageal reflux disease) 03/23/2010     Priority: Medium     Moderate persistent asthma without complication 03/23/2010     Priority: Medium     Overview:   Patient sees Pulm       Seasonal allergies 03/23/2010     Priority: Medium       PERTINENT PAST MEDICAL HISTORY:  See HPI  Past Medical History:   Diagnosis Date     Allergic rhinitis      Anemia      Arthritis      Asthma     copd     Dental abscess 8-2015     Depressive disorder      Gastroesophageal reflux disease      History of emphysema      Hoarseness      Hypertension      Morbid obesity with BMI of 40.0-44.9, adult (H)      Obstructive sleep apnea      Other chronic pain      Respiratory bronchiolitis interstitial lung disease (H)      Sleep apnea        PREVIOUS SURGERIES:  Hysterectomy  Laminectomy   Past Surgical History:   Procedure Laterality Date     CERVICAL FUSION       CHOLECYSTECTOMY       COLONOSCOPY       COLONOSCOPY N/A 2/6/2020    Procedure: COLONOSCOPY, WITH POLYPECTOMY AND BIOPSY;  Surgeon: Julian Mccullough MD;  Location:  GI     ENT SURGERY       ESOPHAGOSCOPY, GASTROSCOPY, DUODENOSCOPY (EGD), COMBINED N/A 2/6/2020    Procedure: ESOPHAGOGASTRODUODENOSCOPY (EGD);  Surgeon: Julian Mccullough MD;  Location:  GI     EXCISE LESION INTRAORAL Bilateral 10/3/2018    Procedure: EXCISE LESION INTRAORAL;  Wide Local Excision Of of Left Oral Cavity Ulcer;  Surgeon: Morro Mijares MD;  Location: [facility] OR      DRAIN SKIN ABSCESS SIMPLE/SINGLE   3/16/2012    Procedure:INCISION AND DRAINAGE, ABSCESS, SIMPLE; Surgeon:CHRISTIANO HANCOCK; Location: GI     HEAD & NECK SURGERY       HYSTERECTOMY       HYSTERECTOMY       INCISION AND DRAINAGE ABDOMEN WASHOUT, COMBINED       INJECT EPIDURAL LUMBAR Right 9/15/2020    Procedure: Lumbar5- sacral 1 epidural steroid injection with fluoroscopy;  Surgeon: Tram Florian MD;  [facility]: [facility]     INJECT EPIDURAL LUMBAR Right 6/29/2021    Procedure: Lumbar 5 sacral 1 epidural steroid injection with fluoroscopy;  Surgeon: Tram Florian MD;  Location: [facility] OR     INJECT SACROILIAC JOINT Bilateral 6/16/2020    Procedure: Bilateral sacroiliac joint steroid injection with fluoroscopy;  Surgeon: Tram Florian MD;  [facility]     LAMINECTOMY THORACIC ONE LEVEL N/A 8/19/2019    Procedure: LAMINECTOMY, SPINE, THORACIC, 11-12 and Part of Lumbar 1, DRAINAGE OF EPIDURAL ABCESS, Epidural Drain Placement X 2;  Surgeon: Yadiel Beal MD;  Location: [facility] OR     CO PERCUT IMPLNT NEUROELECT,EPIDURAL N/A 8/8/2019    Procedure: TRIAL, SPINAL CORD STIMULATOR WITH [employer];  Surgeon: Sipple, Daniel Peter, DO;  Location: [facility];  Service: Pain     RADIO FREQUENCY ABLATION / DESTRUCTION OF SACROILOAC JOINT DORSAL PRIMARY RAMUS Bilateral 12/17/2019    Procedure: Bilateral lumbar radiofrequency ablation with fluoroscopy and intravenous sedation ( Lumbar 2,3,4,5 medial branch nerves for the bilateral lumbar3-4, 4-5 and 5-sacral1 joints.;  Surgeon: Tram Florian MD;  [facility]     RADIO FREQUENCY ABLATION / DESTRUCTION OF SACROILOAC JOINT DORSAL PRIMARY RAMUS Right 11/17/2020    Procedure: Right lumbar medial branch nerve radiofrequency ablation right L2,3,4,5 nerves supplying the right L3-4, L4-5 and L5-S1 facet joints;  Surgeon: Tram Florian MD;  Location: [facility] OR     spinal cord stimulator  08/08/2019     spinal cord stimulator removal  08/13/2019     PREVIOUS ENDOSCOPY:  Last colonoscopy 2015,  normal. Hx of abnormal polyps    ALLERGIES:  Allergies   Allergen Reactions     Bee Venom Anaphylaxis     Bees      Doxycycline Anaphylaxis     Patient thinks it may have been just nausea and vomiting, however unable to confirm     Erythromycin Anaphylaxis and Shortness Of Breath     Other reaction(s): Vomiting     Hydrocodone-Acetaminophen Itching     PERTINENT MEDICATIONS:  Current Outpatient Medications:      albuterol (PROAIR HFA, PROVENTIL HFA, VENTOLIN HFA) 108 (90 BASE) MCG/ACT inhaler, Inhale 2 puffs into the lungs every 6 hours as needed for shortness of breath / dyspnea or wheezing (PT last dose 1.23.2020) , Disp: , Rfl:      ARIPiprazole (ABILIFY) 15 MG tablet, Take 15 mg by mouth At Bedtime , Disp: , Rfl:      busPIRone HCl (BUSPAR) 30 MG tablet, Take 15 mg by mouth At Bedtime, Disp: , Rfl:      celecoxib (CELEBREX) 200 MG capsule, Take 1 capsule (200 mg) by mouth every morning, Disp: 60 capsule, Rfl: 4     cetirizine (ZYRTEC) 10 MG tablet, Take 1 tablet (10 mg) by mouth daily, Disp: 69 tablet, Rfl: 3     cholecalciferol ( ULTRA STRENGTH) 2000 units CAPS, TAKE ONE CAPSULE BY MOUTH DAILY IN AM, Disp: , Rfl:      cyclobenzaprine (FLEXERIL) 10 MG tablet, TAKE 1 TABLET BY MOUTH EVERYDAY AT BEDTIME, Disp: 90 tablet, Rfl: 0     DULoxetine (CYMBALTA) 60 MG capsule, Take 120 mg by mouth At Bedtime , Disp: , Rfl:      EPINEPHrine (EPIPEN/ADRENACLICK/OR ANY BX GENERIC EQUIV) 0.3 MG/0.3ML injection 2-pack, INJECT 0.3ML INTO THE MUSCLE ONCE AS NEEDED FOR ANAPHYLAXIS, Disp: , Rfl:      ferrous sulfate (FEROSUL) 325 (65 Fe) MG tablet, Take 325 mg by mouth daily (with breakfast) , Disp: , Rfl: 0     fluconazole (DIFLUCAN) 100 MG tablet, Take 2 tabs by mouth on day 1, then 1 tab daily x 14 days., Disp: 15 tablet, Rfl: 0     fluconazole (DIFLUCAN) 200 MG tablet, TAKE 1 TABLET BY MOUTH EVERY DAY FOR 14 DAYS, Disp: 14 tablet, Rfl: 0     folic acid (FOLVITE) 1 MG tablet, Take 1 tablet (1 mg) by mouth daily, Disp: 90  tablet, Rfl: 3     lisinopril-hydrochlorothiazide (ZESTORETIC) 20-25 MG tablet, Take 1 tablet by mouth daily, Disp: 90 tablet, Rfl: 1     methotrexate sodium 2.5 MG TABS, TAKE 9 TABLETS BY MOUTH ONCE WEEKLY  Labs  past due. appt past due., Disp: 108 tablet, Rfl: 5     montelukast (SINGULAIR) 10 MG tablet, Take 10 mg by mouth At Bedtime, Disp: , Rfl:      nystatin (MYCOSTATIN) 581584 UNIT/GM external cream, Apply topically 2 times daily, Disp: 30 g, Rfl: 3     omeprazole (PRILOSEC) 40 MG DR capsule, Take 40 mg by mouth as needed (PT last dose appr 2 weeks ago), Disp: , Rfl:      OXYGEN-HELIUM IN, 2-3L PRN during the day, 3L @ night, Disp: , Rfl:      pregabalin (LYRICA) 150 MG capsule, Take 1 capsule (150 mg) by mouth 2 times daily, Disp: 60 capsule, Rfl: 3     Respiratory Therapy Supplies ([facility] CPAP FILTER) MISC, , Disp: , Rfl:      rOPINIRole (REQUIP) 0.5 MG tablet, Take 1 tablet (0.5 mg) by mouth At Bedtime Take 1 tab by mouth at bedtime., Disp: 90 tablet, Rfl: 1     vitamin C 500 MG TABS, Take 500 mg by mouth daily (with lunch) , Disp: , Rfl:      zinc sulfate (ZINCATE) 220 (50 Zn) MG capsule, Take 220 mg by mouth daily (with lunch) , Disp: , Rfl:     Current Facility-Administered Medications:      ropivacaine (NAROPIN) injection 1 mL, 1 mL, , , Gael Dumont MD, 1 mL at 03/29/21 1424     triamcinolone (KENALOG-40) injection 40 mg, 40 mg, , , Gael Dumont MD, 40 mg at 03/29/21 1424    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  Social History     Socioeconomic History     Marital status: Single     Spouse name: Not on file     Number of children: Not on file     Years of education: Not on file     Highest education level:  Not on file   Occupational History     Not on file   Tobacco Use     Smoking status: Current Every Day Smoker     Packs/day: 1.00     Years: 30.00     Pack years: 30.00     Types: Cigarettes     Start date: 1/1/1996     Smokeless tobacco: Never Used     Tobacco comment: has tried the patch   Substance and Sexual Activity     Alcohol use: No     Drug use: Not Currently     Types: Marijuana     Comment: very rarely      Sexual activity: Never   Other Topics Concern     Parent/sibling w/ CABG, MI or angioplasty before 65F 55M? Not Asked   Social History Narrative     Not on file     Social Determinants of Health     Financial Resource Strain:      Difficulty of Paying Living Expenses:    Food Insecurity:      Worried About Running Out of Food in the Last Year:      Ran Out of Food in the Last Year:    Transportation Needs:      Lack of Transportation (Medical):      Lack of Transportation (Non-Medical):    Physical Activity:      Days of Exercise per Week:      Minutes of Exercise per Session:    Stress:      Feeling of Stress :    Social Connections:      Frequency of Communication with Friends and Family:      Frequency of Social Gatherings with Friends and Family:      Attends Sabianist Services:      Active Member of Clubs or Organizations:      Attends Club or Organization Meetings:      Marital Status:    Intimate Partner Violence:      Fear of Current or Ex-Partner:      Emotionally Abused:      Physically Abused:      Sexually Abused:      FAMILY HISTORY:  FH of CRC: None  FH of IBD: None  Family History   Problem Relation Age of Onset     Other Cancer Father         base of tongue cancer at age ~65     Hypertension Father      Back Pain Father      Restless Leg Syndrome Father      Asthma Mother      Restless Leg Syndrome Mother      Restless Leg Syndrome Sister      Breast Cancer Maternal Aunt 55     Past/family/social history reviewed and no changes    PHYSICAL EXAMINATION:  Constitutional: aaox3,  "cooperative, pleasant, not dyspneic/diaphoretic, no acute distress  Vitals reviewed: Ht 1.626 m (5' 4\")   Wt 102.1 kg (225 lb)   LMP  (LMP Unknown)   BMI 38.62 kg/m    Wt:   Wt Readings from Last 2 Encounters:   07/15/21 102.1 kg (225 lb)   06/29/21 100.7 kg (222 lb)      Eyes: Sclera anicteric/injected  Ears/nose/mouth/throat: Normal oropharynx without ulcers or exudate, mucus membranes moist, hearing intact  Neck: supple, thyroid normal size  CV: No edema  Respiratory: Unlabored breathing  Lymph: No axillary, submandibular, supraclavicular or inguinal lymphadenopathy  Abd: obese, mild pain to palpation to epigastric area, nondistended, +bs, no hepatosplenomegaly, no peritoneal signs  Skin: warm, perfused, no jaundice  Psych: Normal affect  MSK: Normal gait    PERTINENT STUDIES:  Orders Only on 09/01/2019   Component Date Value Ref Range Status     WBC 09/23/2019 6.4  4.0 - 11.0 10e9/L Final     RBC Count 09/23/2019 4.04  3.8 - 5.2 10e12/L Final     Hemoglobin 09/23/2019 12.7  11.7 - 15.7 g/dL Final     Hematocrit 09/23/2019 38.9  35.0 - 47.0 % Final     MCV 09/23/2019 96  78 - 100 fl Final     MCH 09/23/2019 31.4  26.5 - 33.0 pg Final     MCHC 09/23/2019 32.6  31.5 - 36.5 g/dL Final     RDW 09/23/2019 13.6  10.0 - 15.0 % Final     Platelet Count 09/23/2019 220  150 - 450 10e9/L Final     Diff Method 09/23/2019 Automated Method   Final     % Neutrophils 09/23/2019 64.1  % Final     % Lymphocytes 09/23/2019 21.9  % Final     % Monocytes 09/23/2019 8.3  % Final     % Eosinophils 09/23/2019 4.4  % Final     % Basophils 09/23/2019 0.8  % Final     % Immature Granulocytes 09/23/2019 0.5  % Final     Nucleated RBCs 09/23/2019 0  0 /100 Final     Absolute Neutrophil 09/23/2019 4.1  1.6 - 8.3 10e9/L Final     Absolute Lymphocytes 09/23/2019 1.4  0.8 - 5.3 10e9/L Final     Absolute Monocytes 09/23/2019 0.5  0.0 - 1.3 10e9/L Final     Absolute Eosinophils 09/23/2019 0.3  0.0 - 0.7 10e9/L Final     Absolute Basophils " 09/23/2019 0.1  0.0 - 0.2 10e9/L Final     Abs Immature Granulocytes 09/23/2019 0.0  0 - 0.4 10e9/L Final     Absolute Nucleated RBC 09/23/2019 0.0   Final     Sodium 09/23/2019 140  133 - 144 mmol/L Final     Potassium 09/23/2019 3.5  3.4 - 5.3 mmol/L Final     Chloride 09/23/2019 106  94 - 109 mmol/L Final     Carbon Dioxide 09/23/2019 31  20 - 32 mmol/L Final     Anion Gap 09/23/2019 3  3 - 14 mmol/L Final     Glucose 09/23/2019 96  70 - 99 mg/dL Final     Urea Nitrogen 09/23/2019 9  7 - 30 mg/dL Final     Creatinine 09/23/2019 0.57  0.52 - 1.04 mg/dL Final     GFR Estimate 09/23/2019 >90  >60 mL/min/[1.73_m2] Final     GFR Estimate If Black 09/23/2019 >90  >60 mL/min/[1.73_m2] Final     Calcium 09/23/2019 8.8  8.5 - 10.1 mg/dL Final     Bilirubin Total 09/23/2019 0.2  0.2 - 1.3 mg/dL Final     Albumin 09/23/2019 3.5  3.4 - 5.0 g/dL Final     Protein Total 09/23/2019 6.6* 6.8 - 8.8 g/dL Final     Alkaline Phosphatase 09/23/2019 86  40 - 150 U/L Final     ALT 09/23/2019 8  0 - 50 U/L Final     AST 09/23/2019 18  0 - 45 U/L Final     CRP Inflammation 09/23/2019 15.4* 0.0 - 8.0 mg/L Final     Bilirubin Direct 09/23/2019 <0.1  0.0 - 0.2 mg/dL Final     Magnesium 09/23/2019 1.9  1.6 - 2.3 mg/dL Final     Phosphorus 09/23/2019 3.0  2.5 - 4.5 mg/dL Final     Triglycerides 09/23/2019 68  <150 mg/dL Final     Sed Rate 09/23/2019 15  0 - 20 mm/h Final     Again, thank you for allowing me to participate in the care of your patient.      Sincerely,    Abhi Villafuerte PA-C

## 2021-07-15 NOTE — PROGRESS NOTES
"Zayra Ramirez is a 51 year old female who is being evaluated via a billable video visit.      The patient has been notified of following:     \"This video visit will be conducted via a call between you and your physician/provider. We have found that certain health care needs can be provided without the need for an in-person physical exam.  This service lets us provide the care you need with a video conversation.  If a prescription is necessary we can send it directly to your pharmacy.  If lab work is needed we can place an order for that and you can then stop by our lab to have the test done at a later time.    If during the course of the call the physician/provider feels a video visit is not appropriate, you will not be charged for this service.\"     Patient confirmed that they are in [place] for today's visit Yes    Video-Visit Details  Type of service:  Video Visit    Video Start Time: 0852 AM  Video End Time:  0908 AM    Originating Location (pt. Location): [place]    Distant Location (provider location):  [facility] [place]     Platform used: [facility]    GI CLINIC VISIT    CC/REFERRING MD:  Neena Tam  REASON FOR CONSULTATION: abdominal pain    ASSESSMENT/PLAN:  49-year-old female with past medical history to include depression, GERD (on omeprazole, unsure dose), rheumatoid arthritis on methotrexate weekly and folic acid daily not currently on prednisone, hypertension on lisinopril, interstitial lung disease, cervical spinal stenosis presents to the GI clinic for follow up regarding epigastric abdominal pain.    1. GERD w/ excess belching: Patient had originally presented with epigastric discomfort ultimately undergoing both a colonoscopy and upper endoscopy.  EGD in Feb 2020 revealed LA grade a esophagitis.  She was instructed to maintain omeprazole 40 mg daily.  Her most bothersome symptom is the ongoing belching and I do not think that increasing her PPI dosage is " going to be helpful in eliminating this symptom.  There are several lifestyle factors that could also be aggravating such as 5-6 cans of Mountain Dew's a day and continuing to smoke.  She also uses an inhaler and currently has thrush that is being treated.  I have suggested to primarily focus on lifestyle modifications to decrease bloat, minimizing her Mountain Dew intake, and work on smoking cessation.  --  Continue prilosec 40 mg per day, take 30-60 before any oral intake  - Symptom and diet journaling  - Recommend low gas diet  - Recommend lifestyle changes to limit aerophagia (no carbonated beverages, no straws, no gum, no hard candy), eat slowly  - Recommend increased activity/exercise - walking 10 minutes a day three times a day to start    2. Thrush: She is currently being treated with Diflucan.  It will be very important for her to practice proper oral hygiene in regards to her inhalers to minimize this from recurring.    3.  Loose stool: Also likely suggestive of ongoing excessive caffeine intake with 4 Mountain Dew's a day.  Colonoscopy last year was unrevealing.  I have suggested to incorporate soluble fiber supplementation and to minimize soda pop intake.  -- Recommend soluble fiber supplementation on a daily basis (Metamucil, citrucel or benefiber).  Start with 1 tablespoon per day and if tolerated, may increase up to 2-3 tablespoons per day.  You may experience some bloating with initiation of fiber supplementation that will improve over the first month.  A good fiber trial to evaluate the effect is 3-6 months.    4. Colorectal cancer screening: History of adenomatous polyp in the past.  Normal colonoscopy 2020, one polyp resected in the sigmoid shown as a tubular adenoma.  No evidence of high-grade dysplasia or invasive malignancy.    RTC 6 months or as needed    Thank you for this consultation.  30 minutes spent on the date of the encounter doing chart review, history and exam, documentation and  further activities as noted above.  It was a pleasure to participate in the care of this patient; please contact us with any further questions.      Abhi Villafuerte PA-C  Division of Gastroenterology, Hepatology and Nutrition  [facility]        HPI  49-year-old female with past medical history to include depression, GERD (on omeprazole, unsure dose), seronegative rheumatoid arthritis on methotrexate weekly and folic acid daily not currently on prednisone, hypertension on lisinopril, interstitial lung disease, cervical spinal stenosis presents to the GI clinic for consultation regarding epigastric abdominal pain.  Patient describes his pain has been going on for the last 2 weeks and wakes her up at night.  She feels that the pain gets worse throughout the day.  She describes it as a numb feeling inside her abdomen.  She can reproduce the pain with palpation.  Pain is also worsened by coughing or moving.  Rest sometimes will improve the pain.  She denies any nausea, vomiting or dysphasia accompanied by this pain.  Bowel movements are average 3 formed to soft stools per day, [place] 4-5.  She denies any blood in the stool.  She had a normal colonoscopy in 2015 and recommended to repeat in 5 years.  I do note that she admits to high caffeine intake with 6 Mountain Dew drinks per day.  She smokes 1 pack/day.    Interval hx 1/8/20:  Pain is still present, however is decreased in severity and frequency.  Still no change with BMs or oral intake. Not keeping up at night. Has some breakthrough reflux and reports she has restarted omeprazole, but unsure dose. No dysphagia. Takes naprosyn BID for back pain and Cymbalta daily.     Interval hx 7/14/21  Has thrush currently (since end of February). Has belching all the time and is having a hard time getting all of the air out. When she does belching, it is very loud. She is still drinks 4 mountain dew per day. She is having 3-4 stools per day that are loose (water to  mashed potatoes) and has urgency.  No blood in the stool. She continues to use albuterol inhaler but no longer on steroid inhaler.      ROS:    No fevers or chills  No weight loss  No blurry vision, double vision or change in vision  No sore throat  No lymphadenopathy  No headache, paraesthesias, or weakness in a limb  No shortness of breath or wheezing  No chest pain or pressure  No arthralgias or myalgias  No rashes or skin changes  No odynophagia or dysphagia  No BRBPR, hematochezia, melena  No dysuria, frequency or urgency  No hot/cold intolerance or polyria  + anxiety or depression    PROBLEM LIST  Patient Active Problem List    Diagnosis Date Noted     Morbid (severe) obesity due to excess calories (H) 08/08/2015     Priority: High     Lumbar radiculopathy, chronic 06/15/2021     Priority: Medium     Added automatically from request for surgery [id number]       Chronic neck pain 05/19/2021     Priority: Medium     Glucose intolerance 04/10/2021     Priority: Medium     Chronic lumbar radiculopathy 03/18/2021     Priority: Medium     Added automatically from request for surgery [id number]       Degenerative lumbar spinal stenosis 08/31/2020     Priority: Medium     Added automatically from request for surgery [id number]       Sacro-iliac pain 06/09/2020     Priority: Medium     Added automatically from request for surgery [id number]       Calculus of gallbladder without cholecystitis without obstruction 01/21/2020     Priority: Medium     Symptomatic cholelithiasis 01/21/2020     Priority: Medium     Inflammatory arthritis 11/27/2019     Priority: Medium     Lumbar spondylosis 11/04/2019     Priority: Medium     Added automatically from request for surgery [id number]       Spinal epidural abscess 08/19/2019     Priority: Medium     Respiratory bronchiolitis interstitial lung disease (H)      Priority: Medium     Subcutaneous emphysema (H) 04/05/2018     Priority: Medium     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)  10/13/2017     Priority: Medium     Oral lesion 02/14/2017     Priority: Medium     Interstitial lung disease (H) 11/17/2016     Priority: Medium     Overview:   Patient sees Pulm       Derangement of temporomandibular joint 11/15/2016     Priority: Medium     Displacement of articular disc of temporomandibular joint with reduction 11/15/2016     Priority: Medium     Arthralgia of temporomandibular joint 09/20/2016     Priority: Medium     Articular disc disorder of temporomandibular joint 09/20/2016     Priority: Medium     Myofascial pain 09/20/2016     Priority: Medium     Esophageal stricture 07/18/2016     Priority: Medium     Overview:   With Hiatal hernia; on Acid blocker lifelong       Stress 06/15/2016     Priority: Medium     Hypoxia 06/08/2016     Priority: Medium     Onychomycosis 06/01/2016     Priority: Medium     Restless leg syndrome 06/01/2016     Priority: Medium     Smoker 11/02/2015     Priority: Medium     Dental abscess 08/08/2015     Priority: Medium     Facial cellulitis 03/05/2015     Priority: Medium     Chronic midline low back pain 11/10/2014     Priority: Medium     Diagnosis updated by automated process. Provider to review and confirm.       HTN (hypertension) 01/28/2013     Priority: Medium     Borderline personality disorder (H) 01/12/2011     Priority: Medium     GERD (gastroesophageal reflux disease) 03/23/2010     Priority: Medium     Moderate persistent asthma without complication 03/23/2010     Priority: Medium     Overview:   Patient sees Pulm       Seasonal allergies 03/23/2010     Priority: Medium       PERTINENT PAST MEDICAL HISTORY:  See HPI  Past Medical History:   Diagnosis Date     Allergic rhinitis      Anemia      Arthritis      Asthma     copd     Dental abscess 8-2015     Depressive disorder      Gastroesophageal reflux disease      History of emphysema      Hoarseness      Hypertension      Morbid obesity with BMI of 40.0-44.9, adult (H)      Obstructive sleep apnea       Other chronic pain      Respiratory bronchiolitis interstitial lung disease (H)      Sleep apnea        PREVIOUS SURGERIES:  Hysterectomy  Laminectomy   Past Surgical History:   Procedure Laterality Date     CERVICAL FUSION       CHOLECYSTECTOMY       COLONOSCOPY       COLONOSCOPY N/A 2/6/2020    Procedure: COLONOSCOPY, WITH POLYPECTOMY AND BIOPSY;  Surgeon: Julian Mccullough MD;  Location:  GI     ENT SURGERY       ESOPHAGOSCOPY, GASTROSCOPY, DUODENOSCOPY (EGD), COMBINED N/A 2/6/2020    Procedure: ESOPHAGOGASTRODUODENOSCOPY (EGD);  Surgeon: Julian Mccullough MD;  Location:  GI     EXCISE LESION INTRAORAL Bilateral 10/3/2018    Procedure: EXCISE LESION INTRAORAL;  Wide Local Excision Of of Left Oral Cavity Ulcer;  Surgeon: Morro Mijares MD;  Location: [facility] OR     HC DRAIN SKIN ABSCESS SIMPLE/SINGLE  3/16/2012    Procedure:INCISION AND DRAINAGE, ABSCESS, SIMPLE; Surgeon:CHRISTIANO HANCOCK; Location: GI     HEAD & NECK SURGERY       HYSTERECTOMY       HYSTERECTOMY       INCISION AND DRAINAGE ABDOMEN WASHOUT, COMBINED       INJECT EPIDURAL LUMBAR Right 9/15/2020    Procedure: Lumbar5- sacral 1 epidural steroid injection with fluoroscopy;  Surgeon: Tram Florian MD;  Location: [facility]     INJECT EPIDURAL LUMBAR Right 6/29/2021    Procedure: Lumbar 5 sacral 1 epidural steroid injection with fluoroscopy;  Surgeon: Tram Florian MD;  Location: [facility] OR     INJECT SACROILIAC JOINT Bilateral 6/16/2020    Procedure: Bilateral sacroiliac joint steroid injection with fluoroscopy;  Surgeon: Tram Florian MD;  [facility]: [facility] OR     LAMINECTOMY THORACIC ONE LEVEL N/A 8/19/2019    Procedure: LAMINECTOMY, SPINE, THORACIC, 11-12 and Part of Lumbar 1, DRAINAGE OF EPIDURAL ABCESS, Epidural Drain Placement X 2;  Surgeon: Yadiel Beal MD;  Location: [facility] OR     NV PERCUT IMPLNT NEUROELECT,EPIDURAL N/A 8/8/2019    Procedure: TRIAL, SPINAL CORD STIMULATOR WITH [employer];  Surgeon: Sipple,  Tyrell Higgins DO;  Location: [facility];  Service: Pain     RADIO FREQUENCY ABLATION / DESTRUCTION OF SACROILOAC JOINT DORSAL PRIMARY RAMUS Bilateral 12/17/2019    Procedure: Bilateral lumbar radiofrequency ablation with fluoroscopy and intravenous sedation ( Lumbar 2,3,4,5 medial branch nerves for the bilateral lumbar3-4, 4-5 and 5-sacral1 joints.;  Surgeon: Tram Florian MD;  Location:  OR     RADIO FREQUENCY ABLATION / DESTRUCTION OF SACROILOAC JOINT DORSAL PRIMARY RAMUS Right 11/17/2020    Procedure: Right lumbar medial branch nerve radiofrequency ablation right L2,3,4,5 nerves supplying the right L3-4, L4-5 and L5-S1 facet joints;  Surgeon: Tram Florian MD;  Location: [facility] OR     spinal cord stimulator  08/08/2019     spinal cord stimulator removal  08/13/2019       PREVIOUS ENDOSCOPY:  Last colonoscopy 2015, normal. Hx of abnormal polyps    ALLERGIES:     Allergies   Allergen Reactions     Bee Venom Anaphylaxis     Bees      Doxycycline Anaphylaxis     Patient thinks it may have been just nausea and vomiting, however unable to confirm     Erythromycin Anaphylaxis and Shortness Of Breath     Other reaction(s): Vomiting     Hydrocodone-Acetaminophen Itching       PERTINENT MEDICATIONS:    Current Outpatient Medications:      albuterol (PROAIR HFA, PROVENTIL HFA, VENTOLIN HFA) 108 (90 BASE) MCG/ACT inhaler, Inhale 2 puffs into the lungs every 6 hours as needed for shortness of breath / dyspnea or wheezing (PT last dose 1.23.2020) , Disp: , Rfl:      ARIPiprazole (ABILIFY) 15 MG tablet, Take 15 mg by mouth At Bedtime , Disp: , Rfl:      busPIRone HCl (BUSPAR) 30 MG tablet, Take 15 mg by mouth At Bedtime, Disp: , Rfl:      celecoxib (CELEBREX) 200 MG capsule, Take 1 capsule (200 mg) by mouth every morning, Disp: 60 capsule, Rfl: 4     cetirizine (ZYRTEC) 10 MG tablet, Take 1 tablet (10 mg) by mouth daily, Disp: 69 tablet, Rfl: 3     cholecalciferol ( ULTRA STRENGTH) 2000 units CAPS, TAKE ONE  CAPSULE BY MOUTH DAILY IN AM, Disp: , Rfl:      cyclobenzaprine (FLEXERIL) 10 MG tablet, TAKE 1 TABLET BY MOUTH EVERYDAY AT BEDTIME, Disp: 90 tablet, Rfl: 0     DULoxetine (CYMBALTA) 60 MG capsule, Take 120 mg by mouth At Bedtime , Disp: , Rfl:      EPINEPHrine (EPIPEN/ADRENACLICK/OR ANY BX GENERIC EQUIV) 0.3 MG/0.3ML injection 2-pack, INJECT 0.3ML INTO THE MUSCLE ONCE AS NEEDED FOR ANAPHYLAXIS, Disp: , Rfl:      ferrous sulfate (FEROSUL) 325 (65 Fe) MG tablet, Take 325 mg by mouth daily (with breakfast) , Disp: , Rfl: 0     fluconazole (DIFLUCAN) 100 MG tablet, Take 2 tabs by mouth on day 1, then 1 tab daily x 14 days., Disp: 15 tablet, Rfl: 0     fluconazole (DIFLUCAN) 200 MG tablet, TAKE 1 TABLET BY MOUTH EVERY DAY FOR 14 DAYS, Disp: 14 tablet, Rfl: 0     folic acid (FOLVITE) 1 MG tablet, Take 1 tablet (1 mg) by mouth daily, Disp: 90 tablet, Rfl: 3     lisinopril-hydrochlorothiazide (ZESTORETIC) 20-25 MG tablet, Take 1 tablet by mouth daily, Disp: 90 tablet, Rfl: 1     methotrexate sodium 2.5 MG TABS, TAKE 9 TABLETS BY MOUTH ONCE WEEKLY  Labs  past due. appt past due., Disp: 108 tablet, Rfl: 5     montelukast (SINGULAIR) 10 MG tablet, Take 10 mg by mouth At Bedtime, Disp: , Rfl:      nystatin (MYCOSTATIN) 384746 UNIT/GM external cream, Apply topically 2 times daily, Disp: 30 g, Rfl: 3     omeprazole (PRILOSEC) 40 MG DR capsule, Take 40 mg by mouth as needed (PT last dose appr 2 weeks ago), Disp: , Rfl:      OXYGEN-HELIUM IN, 2-3L PRN during the day, 3L @ night, Disp: , Rfl:      pregabalin (LYRICA) 150 MG capsule, Take 1 capsule (150 mg) by mouth 2 times daily, Disp: 60 capsule, Rfl: 3     Respiratory Therapy Supplies (CAREUCH [facility] CPAP FILTER) MISC, , Disp: , Rfl:      rOPINIRole (REQUIP) 0.5 MG tablet, Take 1 tablet (0.5 mg) by mouth At Bedtime Take 1 tab by mouth at bedtime., Disp: 90 tablet, Rfl: 1     vitamin C 500 MG TABS, Take 500 mg by mouth daily (with lunch) , Disp: , Rfl:      zinc sulfate  (ZINCATE) 220 (50 Zn) MG capsule, Take 220 mg by mouth daily (with lunch) , Disp: , Rfl:     Current Facility-Administered Medications:      ropivacaine (NAROPIN) injection 1 mL, 1 mL, , , Gael Dumont MD, 1 mL at 03/29/21 1424     triamcinolone (KENALOG-40) injection 40 mg, 40 mg, , , Gael Dumont MD, 40 mg at 03/29/21 1424    Facility-Administered Medications Ordered in Other Visits:      Lidocaine 1 % injection 0.5-5 mL, 0.5-5 mL, Other, Once PRN, Gutierrez Candelario MD     sodium chloride (PF) 0.9% PF flush 5-50 mL, 5-50 mL, Intracatheter, Once PRN, Gutierrez Candelario MD    SOCIAL HISTORY:  Currently not working. [employer]  Social History     Socioeconomic History     Marital status: Single     Spouse name: Not on file     Number of children: Not on file     Years of education: Not on file     Highest education level: Not on file   Occupational History     Not on file   Tobacco Use     Smoking status: Current Every Day Smoker     Packs/day: 1.00     Years: 30.00     Pack years: 30.00     Types: Cigarettes     Start date: 1/1/1996     Smokeless tobacco: Never Used     Tobacco comment: has tried the patch   Substance and Sexual Activity     Alcohol use: No     Drug use: Not Currently     Types: Marijuana     Comment: very rarely      Sexual activity: Never   Other Topics Concern     Parent/sibling w/ CABG, MI or angioplasty before 65F 55M? Not Asked   Social History Narrative     Not on file     Social Determinants of Health     Financial Resource Strain:      Difficulty of Paying Living Expenses:    Food Insecurity:      Worried About Running Out of Food in the Last Year:      Ran Out of Food in the Last Year:    Transportation Needs:      Lack of Transportation (Medical):      Lack of Transportation (Non-Medical):    Physical Activity:      Days of Exercise per Week:      Minutes of Exercise per Session:    Stress:      Feeling of Stress :    Social Connections:      Frequency of  "Communication with Friends and Family:      Frequency of Social Gatherings with Friends and Family:      Attends Mosque Services:      Active Member of Clubs or Organizations:      Attends Club or Organization Meetings:      Marital Status:    Intimate Partner Violence:      Fear of Current or Ex-Partner:      Emotionally Abused:      Physically Abused:      Sexually Abused:        FAMILY HISTORY:  FH of CRC: None  FH of IBD: None  Family History   Problem Relation Age of Onset     Other Cancer Father         base of tongue cancer at age ~65     Hypertension Father      Back Pain Father      Restless Leg Syndrome Father      Asthma Mother      Restless Leg Syndrome Mother      Restless Leg Syndrome Sister      Breast Cancer Maternal Aunt 55       Past/family/social history reviewed and no changes    PHYSICAL EXAMINATION:  Constitutional: aaox3, cooperative, pleasant, not dyspneic/diaphoretic, no acute distress  Vitals reviewed: Ht 1.626 m (5' 4\")   Wt 102.1 kg (225 lb)   LMP  (LMP Unknown)   BMI 38.62 kg/m    Wt:   Wt Readings from Last 2 Encounters:   07/15/21 102.1 kg (225 lb)   06/29/21 100.7 kg (222 lb)      Eyes: Sclera anicteric/injected  Ears/nose/mouth/throat: Normal oropharynx without ulcers or exudate, mucus membranes moist, hearing intact  Neck: supple, thyroid normal size  CV: No edema  Respiratory: Unlabored breathing  Lymph: No axillary, submandibular, supraclavicular or inguinal lymphadenopathy  Abd: obese, mild pain to palpation to epigastric area, nondistended, +bs, no hepatosplenomegaly, no peritoneal signs  Skin: warm, perfused, no jaundice  Psych: Normal affect  MSK: Normal gait      PERTINENT STUDIES:    Orders Only on 09/01/2019   Component Date Value Ref Range Status     WBC 09/23/2019 6.4  4.0 - 11.0 10e9/L Final     RBC Count 09/23/2019 4.04  3.8 - 5.2 10e12/L Final     Hemoglobin 09/23/2019 12.7  11.7 - 15.7 g/dL Final     Hematocrit 09/23/2019 38.9  35.0 - 47.0 % Final     MCV " 09/23/2019 96  78 - 100 fl Final     MCH 09/23/2019 31.4  26.5 - 33.0 pg Final     MCHC 09/23/2019 32.6  31.5 - 36.5 g/dL Final     RDW 09/23/2019 13.6  10.0 - 15.0 % Final     Platelet Count 09/23/2019 220  150 - 450 10e9/L Final     Diff Method 09/23/2019 Automated Method   Final     % Neutrophils 09/23/2019 64.1  % Final     % Lymphocytes 09/23/2019 21.9  % Final     % Monocytes 09/23/2019 8.3  % Final     % Eosinophils 09/23/2019 4.4  % Final     % Basophils 09/23/2019 0.8  % Final     % Immature Granulocytes 09/23/2019 0.5  % Final     Nucleated RBCs 09/23/2019 0  0 /100 Final     Absolute Neutrophil 09/23/2019 4.1  1.6 - 8.3 10e9/L Final     Absolute Lymphocytes 09/23/2019 1.4  0.8 - 5.3 10e9/L Final     Absolute Monocytes 09/23/2019 0.5  0.0 - 1.3 10e9/L Final     Absolute Eosinophils 09/23/2019 0.3  0.0 - 0.7 10e9/L Final     Absolute Basophils 09/23/2019 0.1  0.0 - 0.2 10e9/L Final     Abs Immature Granulocytes 09/23/2019 0.0  0 - 0.4 10e9/L Final     Absolute Nucleated RBC 09/23/2019 0.0   Final     Sodium 09/23/2019 140  133 - 144 mmol/L Final     Potassium 09/23/2019 3.5  3.4 - 5.3 mmol/L Final     Chloride 09/23/2019 106  94 - 109 mmol/L Final     Carbon Dioxide 09/23/2019 31  20 - 32 mmol/L Final     Anion Gap 09/23/2019 3  3 - 14 mmol/L Final     Glucose 09/23/2019 96  70 - 99 mg/dL Final     Urea Nitrogen 09/23/2019 9  7 - 30 mg/dL Final     Creatinine 09/23/2019 0.57  0.52 - 1.04 mg/dL Final     GFR Estimate 09/23/2019 >90  >60 mL/min/[1.73_m2] Final     GFR Estimate If Black 09/23/2019 >90  >60 mL/min/[1.73_m2] Final     Calcium 09/23/2019 8.8  8.5 - 10.1 mg/dL Final     Bilirubin Total 09/23/2019 0.2  0.2 - 1.3 mg/dL Final     Albumin 09/23/2019 3.5  3.4 - 5.0 g/dL Final     Protein Total 09/23/2019 6.6* 6.8 - 8.8 g/dL Final     Alkaline Phosphatase 09/23/2019 86  40 - 150 U/L Final     ALT 09/23/2019 8  0 - 50 U/L Final     AST 09/23/2019 18  0 - 45 U/L Final     CRP Inflammation 09/23/2019 15.4*  0.0 - 8.0 mg/L Final     Bilirubin Direct 09/23/2019 <0.1  0.0 - 0.2 mg/dL Final     Magnesium 09/23/2019 1.9  1.6 - 2.3 mg/dL Final     Phosphorus 09/23/2019 3.0  2.5 - 4.5 mg/dL Final     Triglycerides 09/23/2019 68  <150 mg/dL Final     Sed Rate 09/23/2019 15  0 - 20 mm/h Final

## 2021-07-15 NOTE — PATIENT INSTRUCTIONS
It was a pleasure taking care of you today.  I've included a brief summary of our discussion and care plan from today's visit below.  Please review this information with your primary care provider.  ______________________________________________________________________    My recommendations are summarized as follows:    --  Continue prilosec 40 mg per day, take 30-60 before any oral intake  - Symptom and diet journaling  - Recommend low gas diet  - Recommend lifestyle changes to limit aerophagia (no carbonated beverages, no straws, no gum, no hard candy), eat slowly  - Recommend increased activity/exercise - walking 10 minutes a day three times a day to start    -- Recommend soluble fiber supplementation on a daily basis (Metamucil, citrucel or benefiber).  Start with 1 tablespoon per day and if tolerated, may increase up to 2-3 tablespoons per day.  You may experience some bloating with initiation of fiber supplementation that will improve over the first month.  A good fiber trial to evaluate the effect is 3-6 months.    Return to GI Clinic in 6 months to review your progress.    ______________________________________________________________________    How do I schedule labs, imaging studies, or procedures that were ordered in clinic today?     Labs: To schedule lab appointment at the [facility], use my chart or call [phone]. If you have a [facility] lab closer to home where you are regularly seen you can give them a call.     Procedures: If a colonoscopy, upper endoscopy, breath test, esophageal manometry, or pH impedence was ordered today, our endoscopy team will call you to schedule this. If you have not heard from our endoscopy team within a week, please call [phone] to schedule.     Imaging Studies: If you were scheduled for a CT scan, X-ray, MRI, ultrasound, HIDA scan or other imaging study, please call [phone] to have this scheduled.     Referral: If a referral to another  specialty was ordered, expect a phone call or follow instructions above. If you have not heard from anyone regarding your referral in a week, please call our clinic to check the status.     Who do I call with any questions after my visit?  Please be in touch if there are any further questions that arise following today's visit.  There are multiple ways to contact your gastroenterology care team.        During business hours, you may reach a Gastroenterology nurse at [phone]      To schedule or reschedule an appointment, please call [phone].       You can always send a secure message through [employer].  [employer] messages are answered by your nurse or doctor typically within 24 hours.  Please allow extra time on weekends and holidays.        For urgent/emergent questions after business hours, you may reach the on-call GI Fellow by contacting the [facility]  at [phone].     How will I get the results of any tests ordered?    You will receive all of your results.  If you have signed up for [email or web address], any tests ordered at your visit will be available to you after your physician reviews them.  Typically this takes 1-2 weeks.  If there are urgent results that require a change in your care plan, your physician or nurse will call you to discuss the next steps.      What is Ret[employer]?  Ret[employer] is a secure way for you to access all of your healthcare records from the [facility].  It is a web based computer program, so you can sign on to it from any location.  It also allows you to send secure messages to your care team.  I recommend signing up for RetAPPs access if you have not already done so and are comfortable with using a computer.         Sincerely,    Abhi Villafuerte PA-C  [facility]  Division of Gastroenterology

## 2021-07-15 NOTE — NURSING NOTE
"Chief Complaint   Patient presents with     Video Visit     follow up       Vitals:    07/15/21 0813   Weight: 225 lb   Height: 5' 4\"       Body mass index is 38.62 kg/m .    Leann Hobbs CMA    "

## 2021-07-16 ENCOUNTER — TRANSFERRED RECORDS (OUTPATIENT)
Dept: HEALTH INFORMATION MANAGEMENT | Facility: CLINIC | Age: 51
End: 2021-07-16

## 2021-07-16 DIAGNOSIS — I10 ESSENTIAL HYPERTENSION: ICD-10-CM

## 2021-07-20 RX ORDER — LISINOPRIL AND HYDROCHLOROTHIAZIDE 20; 25 MG/1; MG/1
1 TABLET ORAL DAILY
Qty: 90 TABLET | Refills: 3 | Status: SHIPPED | OUTPATIENT
Start: 2021-07-20 | End: 2022-01-20

## 2021-07-21 ENCOUNTER — TELEPHONE (OUTPATIENT)
Dept: INTERNAL MEDICINE | Facility: CLINIC | Age: 51
End: 2021-07-21

## 2021-07-21 ENCOUNTER — TELEPHONE (OUTPATIENT)
Dept: GASTROENTEROLOGY | Facility: CLINIC | Age: 51
End: 2021-07-21

## 2021-07-21 DIAGNOSIS — K21.9 GASTROESOPHAGEAL REFLUX DISEASE, UNSPECIFIED WHETHER ESOPHAGITIS PRESENT: ICD-10-CM

## 2021-07-21 NOTE — TELEPHONE ENCOUNTER
[facility] Call Center    Phone Message    May a detailed message be left on voicemail: yes     Reason for Call: Other: Pt called in stating that the pharmacy has reached out and is unable to fill the rx for the medication. The pt doesn't have any clarification as to why they can't feel it but believes it's the wording or dosage amount. Please reach put to the pharmacy. Thank you.     Action Taken: Message routed to:  [facility] ([facility] alex gi    Travel Screening: Not Applicable

## 2021-07-21 NOTE — TELEPHONE ENCOUNTER
Call to schedule follow up in October with PCP Neena Tam per provider last visit on 6/10/21. Patient states she will call back to schedule.

## 2021-07-22 RX ORDER — OMEPRAZOLE 40 MG/1
40 CAPSULE, DELAYED RELEASE ORAL DAILY
Qty: 180 CAPSULE | Refills: 3 | Status: SHIPPED | OUTPATIENT
Start: 2021-07-22 | End: 2022-11-01

## 2021-07-22 NOTE — TELEPHONE ENCOUNTER
Received a message that the Prilosec has not been filled.   Call placed to [facility] Pharmacist and the order was placed for PRN.   Message sent to the provider and the order was resent.   Patient updated that the order was placed

## 2021-07-23 ENCOUNTER — TELEPHONE (OUTPATIENT)
Dept: OTOLARYNGOLOGY | Facility: CLINIC | Age: 51
End: 2021-07-23

## 2021-07-27 ENCOUNTER — VIRTUAL VISIT (OUTPATIENT)
Dept: ANESTHESIOLOGY | Facility: CLINIC | Age: 51
End: 2021-07-27
Payer: COMMERCIAL

## 2021-07-27 DIAGNOSIS — M79.2 NEUROPATHIC PAIN: Primary | ICD-10-CM

## 2021-07-27 DIAGNOSIS — M54.16 LUMBAR RADICULOPATHY: ICD-10-CM

## 2021-07-27 PROCEDURE — 99214 OFFICE O/P EST MOD 30 MIN: CPT | Mod: 95 | Performed by: ANESTHESIOLOGY

## 2021-07-27 ASSESSMENT — PAIN SCALES - GENERAL: PAINLEVEL: MODERATE PAIN (4)

## 2021-07-27 NOTE — LETTER
7/27/2021       RE: Zayra Ramirez  [address] [place] [address]     Dear Colleague,    Thank you for referring your patient, Zayra Ramirez, to the [facility] [place] at [facility]. Please see a copy of my visit note below.    Pain clinic follow up note    HPI:   Zayra Ramirez is a 51 year old year old female  who presents to the pain clinic with back pain, s/p right L5-S1 EDUARDO    Patient Supplied Answers To the [facility] Pain Questionnaire  [facility] -  Patient Entered Questionnaire/Answers 6/15/2021   What number best describes your pain right now:  0 = No pain  to  10 = Worst pain imaginable 4   How would you describe the pain? sharp, dull, aching   Which of the following worsen your pain? sitting   Which of the following improve or reduce your pain?  nothing relieves the pain   What number best describes your average pain for the past week:  0 = No pain  to  10 = Worst pain imaginable 7   What number best describes your LOWEST pain in past 24 hours:  0 = No pain  to  10 = Worst pain imaginable 4   What number best describes your WORST pain in past 24 hours:  0 = No pain  to  10 = Worst pain imaginable 8   When is your pain worst? Constant   What non-medicine treatments have you already had for your pain? none   Have you tried treating your pain with medication?  No   Are you currently taking medications for your pain? 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  The patient states that she continue to have discomfort on the left side.  The patient reports that the left side pain is new and is higher up in the thigh.  The recent epidural steroid injection completed at right L5-S1 resulted in improved right leg pain and some improvement in lower back pain.  She has noticed worsening of pain across her lower back which had  improved after the ablation and think she needs her ablation repeated    ROS:  Review of Systems   All other systems reviewed and are negative.        Significant Medical History:   Past Medical History:   Diagnosis Date     Allergic rhinitis      Anemia      Arthritis      Asthma     copd     Dental abscess 8-2015     Depressive disorder      Gastroesophageal reflux disease      History of emphysema      Hoarseness      Hypertension      Morbid obesity with BMI of 40.0-44.9, adult (H)      Obstructive sleep apnea      Other chronic pain      Respiratory bronchiolitis interstitial lung disease (H)      Sleep apnea        Past Surgical History:  Past Surgical History:   Procedure Laterality Date     CERVICAL FUSION       CHOLECYSTECTOMY       COLONOSCOPY       COLONOSCOPY N/A 2/6/2020    Procedure: COLONOSCOPY, WITH POLYPECTOMY AND BIOPSY;  Surgeon: Julian Mccullough MD;  Location:  GI     ENT SURGERY       ESOPHAGOSCOPY, GASTROSCOPY, DUODENOSCOPY (EGD), COMBINED N/A 2/6/2020    Procedure: ESOPHAGOGASTRODUODENOSCOPY (EGD);  Surgeon: Julian Mccullough MD;  Location:  GI     EXCISE LESION INTRAORAL Bilateral 10/3/2018    Procedure: EXCISE LESION INTRAORAL;  Wide Local Excision Of of Left Oral Cavity Ulcer;  Surgeon: Morro Mijares MD;  Location:  OR     HC DRAIN SKIN ABSCESS SIMPLE/SINGLE  3/16/2012    Procedure:INCISION AND DRAINAGE, ABSCESS, SIMPLE; Surgeon:CHRISTIANO HANCOCK; Location: GI     HEAD & NECK SURGERY       HYSTERECTOMY       HYSTERECTOMY       INCISION AND DRAINAGE ABDOMEN WASHOUT, COMBINED       INJECT EPIDURAL LUMBAR Right 9/15/2020    Procedure: Lumbar5- sacral 1 epidural steroid injection with fluoroscopy;  Surgeon: Tram Florian MD;  [facility]     INJECT EPIDURAL LUMBAR Right 6/29/2021    Procedure: Lumbar 5 sacral 1 epidural steroid injection with fluoroscopy;  Surgeon: Tram Florian MD;  [facility]     INJECT SACROILIAC JOINT Bilateral 6/16/2020     Procedure: Bilateral sacroiliac joint steroid injection with fluoroscopy;  Surgeon: Tram Florian MD;  [facility]     LAMINECTOMY THORACIC ONE LEVEL N/A 8/19/2019    Procedure: LAMINECTOMY, SPINE, THORACIC, 11-12 and Part of Lumbar 1, DRAINAGE OF EPIDURAL ABCESS, Epidural Drain Placement X 2;  Surgeon: Yadiel Beal MD;  [facility] OR     HI PERCUT IMPLNT NEUROELECT,EPIDURAL N/A 8/8/2019    Procedure: TRIAL, SPINAL CORD STIMULATOR WITH BOSTON SCIENTIFIC;  Surgeon: Sipple, Daniel Peter, DO;  Location: [facility];  Service: Pain     RADIO FREQUENCY ABLATION / DESTRUCTION OF SACROILOAC JOINT DORSAL PRIMARY RAMUS Bilateral 12/17/2019    Procedure: Bilateral lumbar radiofrequency ablation with fluoroscopy and intravenous sedation ( Lumbar 2,3,4,5 medial branch nerves for the bilateral lumbar3-4, 4-5 and 5-sacral1 joints.;  Surgeon: Tram Florian MD;  [facility]     RADIO FREQUENCY ABLATION / DESTRUCTION OF SACROILOAC JOINT DORSAL PRIMARY RAMUS Right 11/17/2020    Procedure: Right lumbar medial branch nerve radiofrequency ablation right L2,3,4,5 nerves supplying the right L3-4, L4-5 and L5-S1 facet joints;  Surgeon: Tram Florian MD;  [place] [facility] [place]     spinal cord stimulator  08/08/2019     spinal cord stimulator removal  08/13/2019       Family History:  Family History   Problem Relation Age of Onset     Other Cancer Father         base of tongue cancer at age ~65     Hypertension Father      Back Pain Father      Restless Leg Syndrome Father      Asthma Mother      Restless Leg Syndrome Mother      Restless Leg Syndrome Sister      Breast Cancer Maternal Aunt 55          Social History:  Social History     Socioeconomic History     Marital status: Single     Spouse name: Not on file     Number of children: Not on file     Years of education: Not on file     Highest education level: Not on file   Occupational History     Not on file   Tobacco Use     Smoking status: Current Every Day  Smoker     Packs/day: 1.00     Years: 30.00     Pack years: 30.00     Types: Cigarettes     Start date: 1/1/1996     Smokeless tobacco: Never Used     Tobacco comment: has tried the patch   Substance and Sexual Activity     Alcohol use: No     Drug use: Not Currently     Types: Marijuana     Comment: very rarely      Sexual activity: Never   Other Topics Concern     Parent/sibling w/ CABG, MI or angioplasty before 65F 55M? Not Asked   Social History Narrative     Not on file     Social Determinants of Health     Financial Resource Strain:      Difficulty of Paying Living Expenses:    Food Insecurity:      Worried About Running Out of Food in the Last Year:      Ran Out of Food in the Last Year:    Transportation Needs:      Lack of Transportation (Medical):      Lack of Transportation (Non-Medical):    Physical Activity:      Days of Exercise per Week:      Minutes of Exercise per Session:    Stress:      Feeling of Stress :    Social Connections:      Frequency of Communication with Friends and Family:      Frequency of Social Gatherings with Friends and Family:      Attends [employer] Services:      Active Member of Clubs or Organizations:      Attends Club or Organization Meetings:      Marital Status:    Intimate Partner Violence:      Fear of Current or Ex-Partner:      Emotionally Abused:      Physically Abused:      Sexually Abused:      Social History     Social History Narrative     Not on file          Allergies:  Allergies   Allergen Reactions     Bee Venom Anaphylaxis     Bees      Doxycycline Anaphylaxis     Patient thinks it may have been just nausea and vomiting, however unable to confirm     Erythromycin Anaphylaxis and Shortness Of Breath     Other reaction(s): Vomiting     Hydrocodone-Acetaminophen Itching       Current Medications:   Current Outpatient Medications   Medication Sig Dispense Refill     albuterol (PROAIR HFA, PROVENTIL HFA, VENTOLIN HFA) 108 (90 BASE) MCG/ACT inhaler Inhale 2 puffs  into the lungs every 6 hours as needed for shortness of breath / dyspnea or wheezing (PT last dose 1.23.2020)        ARIPiprazole (ABILIFY) 15 MG tablet Take 15 mg by mouth At Bedtime        busPIRone HCl (BUSPAR) 30 MG tablet Take 15 mg by mouth At Bedtime       celecoxib (CELEBREX) 200 MG capsule Take 1 capsule (200 mg) by mouth every morning 60 capsule 4     cetirizine (ZYRTEC) 10 MG tablet Take 1 tablet (10 mg) by mouth daily 69 tablet 3     cholecalciferol ( ULTRA STRENGTH) 2000 units CAPS TAKE ONE CAPSULE BY MOUTH DAILY IN AM       cyclobenzaprine (FLEXERIL) 10 MG tablet Take 1 tablet (10 mg) by mouth daily 90 tablet 1     DULoxetine (CYMBALTA) 60 MG capsule Take 120 mg by mouth At Bedtime        EPINEPHrine (EPIPEN/ADRENACLICK/OR ANY BX GENERIC EQUIV) 0.3 MG/0.3ML injection 2-pack INJECT 0.3ML INTO THE MUSCLE ONCE AS NEEDED FOR ANAPHYLAXIS       ferrous sulfate (FEROSUL) 325 (65 Fe) MG tablet Take 325 mg by mouth daily (with breakfast)   0     fluconazole (DIFLUCAN) 100 MG tablet Take 2 tabs by mouth on day 1, then 1 tab daily x 14 days. 15 tablet 0     fluconazole (DIFLUCAN) 200 MG tablet TAKE 1 TABLET BY MOUTH EVERY DAY FOR 14 DAYS 14 tablet 0     folic acid (FOLVITE) 1 MG tablet Take 1 tablet (1 mg) by mouth daily 90 tablet 3     lisinopril-hydrochlorothiazide (ZESTORETIC) 20-25 MG tablet Take 1 tablet by mouth daily 90 tablet 3     methotrexate sodium 2.5 MG TABS TAKE 9 TABLETS BY MOUTH ONCE WEEKLY  Labs  past due. appt past due. 108 tablet 5     montelukast (SINGULAIR) 10 MG tablet Take 10 mg by mouth At Bedtime       nystatin (MYCOSTATIN) 766564 UNIT/GM external cream Apply topically 2 times daily 30 g 3     omeprazole (PRILOSEC) 40 MG DR capsule Take 1 capsule (40 mg) by mouth daily 180 capsule 3     OXYGEN-HELIUM IN 2-3L PRN during the day, 3L @ night       pregabalin (LYRICA) 150 MG capsule Take 1 capsule (150 mg) by mouth 2 times daily 60 capsule 3     Respiratory Therapy Supplies (CARETOUCH  UNIVERSL CPAP FILTER) MISC        rOPINIRole (REQUIP) 0.5 MG tablet Take 1 tablet (0.5 mg) by mouth At Bedtime Take 1 tab by mouth at bedtime. 90 tablet 1     vitamin C 500 MG TABS Take 500 mg by mouth daily (with lunch)        zinc sulfate (ZINCATE) 220 (50 Zn) MG capsule Take 220 mg by mouth daily (with lunch)        Physical Exam:     LMP  (LMP Unknown)     General Appearance: No distress, seated comfortably  Mood: Euthymic  HE ENT: Non constricted pupils  Respiratory: Non labored breathing  ASSESSMENT AND PLAN:     Encounter Diagnosis:  1.  Right L5, left L2 lumbar radiculopathy    2.  Lumbar spondylosis  3. Sacroiliac dysfunction, right  4. S/P epidural abscess with laminectomy and drainage  5. Lateral femoral cutaneous nerve entrapment right       Zayra Ramirez is a 51 year old year old female  who presents to the pain clinic with worsening pain in the left thigh, s/p L5-S1 epidural steroid injection.  The patient has an improvement in pain in the right leg.  She states that the left leg pain is new and she has return of pain in her lower back area across the back    RECOMMENDATIONS:     1.  I discussed therapy with the patient to consider repeated MRI.  I will order an open MRI which she plans to [facility] at [facility].  The patient states that she does not want to have scanning done in the conventional MRI because of her increased BMI    2. Procedure: I have discussed the option of doing a left L1-2 epidural steroid injection after the MRI results are available.  We will also consider repeating the lumbar RFA at L2-3-4 5 on the right side at a subsequent visit    3.  In the past we have discussed the importance of smoking cessation and pain psychology follow-up    Follow up: The patient will contact the clinic after her MRI is completed to review the results    Again, thank you for allowing me to participate in the care of your patient.      Sincerely,  Tram Florian MD

## 2021-07-27 NOTE — PATIENT INSTRUCTIONS
Treatment planning:    External MRI ordered.     Call the clinic once the MRI is complete, for further treatment planning.       Recommended Follow up:      As indicated based on MRI.        To speak with a nurse, schedule/reschedule/cancel a clinic appointment, or request a medication refill call: [phone], option #1.    You can also reach us by [employer]: [email or web address]

## 2021-07-27 NOTE — PROGRESS NOTES
Type of service:  Video Visit    Video Start Time: 9:19 AM    Video End Time:9:28 AM    Originating Location (pt. Location): Work    Distant Location (provider location):  [facility] [place]     Platform used for Video Visit: [facility]    Pain clinic follow up note    HPI:   Zayra Ramirez is a 51 year old year old female  who presents to the pain clinic with back pain, s/p right L5-S1 EDUARDO    Patient Supplied Answers To the [facility] Pain Questionnaire  [facility] Pain -  Patient Entered Questionnaire/Answers 6/15/2021   What number best describes your pain right now:  0 = No pain  to  10 = Worst pain imaginable 4   How would you describe the pain? sharp, dull, aching   Which of the following worsen your pain? sitting   Which of the following improve or reduce your pain?  nothing relieves the pain   What number best describes your average pain for the past week:  0 = No pain  to  10 = Worst pain imaginable 7   What number best describes your LOWEST pain in past 24 hours:  0 = No pain  to  10 = Worst pain imaginable 4   What number best describes your WORST pain in past 24 hours:  0 = No pain  to  10 = Worst pain imaginable 8   When is your pain worst? Constant   What non-medicine treatments have you already had for your pain? none   Have you tried treating your pain with medication?  No   Are you currently taking medications for your pain? 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  The patient states that she continue to have discomfort on the left side.  The patient reports that the left side pain is new and is higher up in the thigh.  The recent epidural steroid injection completed at right L5-S1 resulted in improved right leg pain and some improvement in lower back pain.  She has noticed worsening of pain across her lower back which had improved after the ablation and think she needs  her ablation repeated    ROS:  Review of Systems   All other systems reviewed and are negative.        Significant Medical History:   Past Medical History:   Diagnosis Date     Allergic rhinitis      Anemia      Arthritis      Asthma     copd     Dental abscess 8-2015     Depressive disorder      Gastroesophageal reflux disease      History of emphysema      Hoarseness      Hypertension      Morbid obesity with BMI of 40.0-44.9, adult (H)      Obstructive sleep apnea      Other chronic pain      Respiratory bronchiolitis interstitial lung disease (H)      Sleep apnea           Past Surgical History:  Past Surgical History:   Procedure Laterality Date     CERVICAL FUSION       CHOLECYSTECTOMY       COLONOSCOPY       COLONOSCOPY N/A 2/6/2020    Procedure: COLONOSCOPY, WITH POLYPECTOMY AND BIOPSY;  Surgeon: Julian Mccullough MD;  Location:  GI     ENT SURGERY       ESOPHAGOSCOPY, GASTROSCOPY, DUODENOSCOPY (EGD), COMBINED N/A 2/6/2020    Procedure: ESOPHAGOGASTRODUODENOSCOPY (EGD);  Surgeon: Julian Mccullough MD;  Location:  GI     EXCISE LESION INTRAORAL Bilateral 10/3/2018    Procedure: EXCISE LESION INTRAORAL;  Wide Local Excision Of of Left Oral Cavity Ulcer;  Surgeon: Morro Mijares MD;  Location:  OR     HC DRAIN SKIN ABSCESS SIMPLE/SINGLE  3/16/2012    Procedure:INCISION AND DRAINAGE, ABSCESS, SIMPLE; Surgeon:CHRISTIANO HANCOCK; Location: GI     HEAD & NECK SURGERY       HYSTERECTOMY       HYSTERECTOMY       INCISION AND DRAINAGE ABDOMEN WASHOUT, COMBINED       INJECT EPIDURAL LUMBAR Right 9/15/2020    Procedure: Lumbar5- sacral 1 epidural steroid injection with fluoroscopy;  Surgeon: Tram Florian MD;  [facility]     INJECT EPIDURAL LUMBAR Right 6/29/2021    Procedure: Lumbar 5 sacral 1 epidural steroid injection with fluoroscopy;  Surgeon: Tram Florian MD;  [facility]     INJECT SACROILIAC JOINT Bilateral 6/16/2020    Procedure: Bilateral sacroiliac joint steroid  injection with fluoroscopy;  Surgeon: Tram Florian MD;  [facility]     LAMINECTOMY THORACIC ONE LEVEL N/A 8/19/2019    Procedure: LAMINECTOMY, SPINE, THORACIC, 11-12 and Part of Lumbar 1, DRAINAGE OF EPIDURAL ABCESS, Epidural Drain Placement X 2;  Surgeon: Yadiel Beal MD;  Location: [facility] OR     RI PERCUT IMPLNT NEUROELECT,EPIDURAL N/A 8/8/2019    Procedure: TRIAL, SPINAL CORD STIMULATOR WITH BOSTON Apertio;  Surgeon: Sipple, Daniel Peter, DO;  Location: [facility];  Service: Pain     RADIO FREQUENCY ABLATION / DESTRUCTION OF SACROILOAC JOINT DORSAL PRIMARY RAMUS Bilateral 12/17/2019    Procedure: Bilateral lumbar radiofrequency ablation with fluoroscopy and intravenous sedation ( Lumbar 2,3,4,5 medial branch nerves for the bilateral lumbar3-4, 4-5 and 5-sacral1 joints.;  Surgeon: Tram Florian MD;  [facility]     RADIO FREQUENCY ABLATION / DESTRUCTION OF SACROILOAC JOINT DORSAL PRIMARY RAMUS Right 11/17/2020    Procedure: Right lumbar medial branch nerve radiofrequency ablation right L2,3,4,5 nerves supplying the right L3-4, L4-5 and L5-S1 facet joints;  Surgeon: Tram Florian MD;  Location: [facility] OR     spinal cord stimulator  08/08/2019     spinal cord stimulator removal  08/13/2019          Family History:  Family History   Problem Relation Age of Onset     Other Cancer Father         base of tongue cancer at age ~65     Hypertension Father      Back Pain Father      Restless Leg Syndrome Father      Asthma Mother      Restless Leg Syndrome Mother      Restless Leg Syndrome Sister      Breast Cancer Maternal Aunt 55          Social History:  Social History     Socioeconomic History     Marital status: Single     Spouse name: Not on file     Number of children: Not on file     Years of education: Not on file     Highest education level: Not on file   Occupational History     Not on file   Tobacco Use     Smoking status: Current Every Day Smoker     Packs/day: 1.00     Years: 30.00      Pack years: 30.00     Types: Cigarettes     Start date: 1/1/1996     Smokeless tobacco: Never Used     Tobacco comment: has tried the patch   Substance and Sexual Activity     Alcohol use: No     Drug use: Not Currently     Types: Marijuana     Comment: very rarely      Sexual activity: Never   Other Topics Concern     Parent/sibling w/ CABG, MI or angioplasty before 65F 55M? Not Asked   Social History Narrative     Not on file     Social Determinants of Health     Financial Resource Strain:      Difficulty of Paying Living Expenses:    Food Insecurity:      Worried About Running Out of Food in the Last Year:      Ran Out of Food in the Last Year:    Transportation Needs:      Lack of Transportation (Medical):      Lack of Transportation (Non-Medical):    Physical Activity:      Days of Exercise per Week:      Minutes of Exercise per Session:    Stress:      Feeling of Stress :    Social Connections:      Frequency of Communication with Friends and Family:      Frequency of Social Gatherings with Friends and Family:      Attends [employer] Services:      Active Member of Clubs or Organizations:      Attends Club or Organization Meetings:      Marital Status:    Intimate Partner Violence:      Fear of Current or Ex-Partner:      Emotionally Abused:      Physically Abused:      Sexually Abused:      Social History     Social History Narrative     Not on file          Allergies:  Allergies   Allergen Reactions     Bee Venom Anaphylaxis     Bees      Doxycycline Anaphylaxis     Patient thinks it may have been just nausea and vomiting, however unable to confirm     Erythromycin Anaphylaxis and Shortness Of Breath     Other reaction(s): Vomiting     Hydrocodone-Acetaminophen Itching       Current Medications:   Current Outpatient Medications   Medication Sig Dispense Refill     albuterol (PROAIR HFA, PROVENTIL HFA, VENTOLIN HFA) 108 (90 BASE) MCG/ACT inhaler Inhale 2 puffs into the lungs every 6 hours as needed for  shortness of breath / dyspnea or wheezing (PT last dose 1.23.2020)        ARIPiprazole (ABILIFY) 15 MG tablet Take 15 mg by mouth At Bedtime        busPIRone HCl (BUSPAR) 30 MG tablet Take 15 mg by mouth At Bedtime       celecoxib (CELEBREX) 200 MG capsule Take 1 capsule (200 mg) by mouth every morning 60 capsule 4     cetirizine (ZYRTEC) 10 MG tablet Take 1 tablet (10 mg) by mouth daily 69 tablet 3     cholecalciferol ( ULTRA STRENGTH) 2000 units CAPS TAKE ONE CAPSULE BY MOUTH DAILY IN AM       cyclobenzaprine (FLEXERIL) 10 MG tablet Take 1 tablet (10 mg) by mouth daily 90 tablet 1     DULoxetine (CYMBALTA) 60 MG capsule Take 120 mg by mouth At Bedtime        EPINEPHrine (EPIPEN/ADRENACLICK/OR ANY BX GENERIC EQUIV) 0.3 MG/0.3ML injection 2-pack INJECT 0.3ML INTO THE MUSCLE ONCE AS NEEDED FOR ANAPHYLAXIS       ferrous sulfate (FEROSUL) 325 (65 Fe) MG tablet Take 325 mg by mouth daily (with breakfast)   0     fluconazole (DIFLUCAN) 100 MG tablet Take 2 tabs by mouth on day 1, then 1 tab daily x 14 days. 15 tablet 0     fluconazole (DIFLUCAN) 200 MG tablet TAKE 1 TABLET BY MOUTH EVERY DAY FOR 14 DAYS 14 tablet 0     folic acid (FOLVITE) 1 MG tablet Take 1 tablet (1 mg) by mouth daily 90 tablet 3     lisinopril-hydrochlorothiazide (ZESTORETIC) 20-25 MG tablet Take 1 tablet by mouth daily 90 tablet 3     methotrexate sodium 2.5 MG TABS TAKE 9 TABLETS BY MOUTH ONCE WEEKLY  Labs  past due. appt past due. 108 tablet 5     montelukast (SINGULAIR) 10 MG tablet Take 10 mg by mouth At Bedtime       nystatin (MYCOSTATIN) 418366 UNIT/GM external cream Apply topically 2 times daily 30 g 3     omeprazole (PRILOSEC) 40 MG DR capsule Take 1 capsule (40 mg) by mouth daily 180 capsule 3     OXYGEN-HELIUM IN 2-3L PRN during the day, 3L @ night       pregabalin (LYRICA) 150 MG capsule Take 1 capsule (150 mg) by mouth 2 times daily 60 capsule 3     Respiratory Therapy Supplies (CAREUCH [facility] CPAP FILTER) MISC        rOPINIRole  (REQUIP) 0.5 MG tablet Take 1 tablet (0.5 mg) by mouth At Bedtime Take 1 tab by mouth at bedtime. 90 tablet 1     vitamin C 500 MG TABS Take 500 mg by mouth daily (with lunch)        zinc sulfate (ZINCATE) 220 (50 Zn) MG capsule Take 220 mg by mouth daily (with lunch)        Physical Exam:     LMP  (LMP Unknown)     General Appearance: No distress, seated comfortably  Mood: Euthymic  HE ENT: Non constricted pupils  Respiratory: Non labored breathing  ASSESSMENT AND PLAN:     Encounter Diagnosis:  1.  Right L5, left L2 lumbar radiculopathy    2.  Lumbar spondylosis  3. Sacroiliac dysfunction, right  4. S/P epidural abscess with laminectomy and drainage  5. Lateral femoral cutaneous nerve entrapment right       Zayra Ramirez is a 51 year old year old female  who presents to the pain clinic with worsening pain in the left thigh, s/p L5-S1 epidural steroid injection.  The patient has an improvement in pain in the right leg.  She states that the left leg pain is new and she has return of pain in her lower back area across the back    RECOMMENDATIONS:     1.  I discussed therapy with the patient to consider repeated MRI.  I will order an open MRI which she plans to complete at [facility].  The patient states that she does not want to have scanning done in the conventional MRI because of her increased BMI    2. Procedure: I have discussed the option of doing a left L1-2 epidural steroid injection after the MRI results are available.  We will also consider repeating the lumbar RFA at L2-3-4 5 on the right side at a subsequent visit    3.  In the past we have discussed the importance of smoking cessation and pain psychology follow-up    Follow up: The patient will contact the clinic after her MRI is completed to review the results

## 2021-07-27 NOTE — PROGRESS NOTES
Zayra is a 51 year old who is being evaluated via a billable video visit.      How would you like to obtain your AVS? Mail a copy  If the video visit is dropped, the invitation should be resent by: Text to cell phone: [phone]  Will anyone else be joining your video visit? No

## 2021-08-02 ENCOUNTER — TELEPHONE (OUTPATIENT)
Dept: RHEUMATOLOGY | Facility: CLINIC | Age: 51
End: 2021-08-02

## 2021-08-02 NOTE — TELEPHONE ENCOUNTER
The fairly abrupt ramp up in the pain in the shoulders, and the focus of pain isolated in the shoulders argues against a body wide inflammatory process as the cause.  Moreover, the numbness in the hand is a concerning symptom for pinched nerve in the neck recently had imaging of the cervical spine to assess status of previously inserted hardware.  The differential most likely possibilities include either rotator cuff tendinitis/bursitis or nerve compression.  I do not think that Celebrex is likely a cause of the shoulder pain (the report from the patient's call in suggests this?).  I recommend that patient first be seen in primary care

## 2021-08-02 NOTE — TELEPHONE ENCOUNTER
SUBJECTIVE/OBJECTIVE                                                    Zayra Ramirez is a 51 year old female who calls with shoulder pain.    Patient has diagnosis of: Seronegative inflammatory arthritis  Rheumatology Provider: Dr. Panchito Saenz MD      Onset: 7/30/2021    Duration: 3 days    Description: pain, pressure in shoulder that has moved from left to right and currently both sides are affected    Location: shoulder    Intensity:  severe    Accompanying signs and symptoms: numbness in hand    Aggravating factors include:  Lifting, raising arms over head    Relieving factors include: None      History  Previous similar problem: no   Is this a usual flare?: No       Current Therapies:  Prednisone? No    Dose: N/A  Infusion Therapy? No    Drug Name: N/A   Next Infusion: N/A  Home Therapies:  Heat, Ice      Problems taking medications regularly: No    Medication side effects: patient states that she has started Celebrex 2 months ago and thinks that the pain may be related to starting this medication    Difficulty with ADLs: moving in bed, pushing/pulling, reaching, getting in/out of car, bed, chair, bus, lifting and bending. Patient stated that she is a  and has difficulties with this activity.     Allergies:   Allergies   Allergen Reactions     Bee Venom Anaphylaxis     Bees      Doxycycline Anaphylaxis     Patient thinks it may have been just nausea and vomiting, however unable to confirm     Erythromycin Anaphylaxis and Shortness Of Breath     Other reaction(s): Vomiting     Hydrocodone-Acetaminophen Itching     Additional history: as documented  Preferred Pharmacy: [facility]  Call back phone number:  [phone] (home) ,   Telephone Information:   Mobile [phone]       Ellie Patel CMA  Adult Rheumatology Clinic  [facility]

## 2021-08-02 NOTE — TELEPHONE ENCOUNTER
Health Call Center    Phone Message    May a detailed message be left on voicemail: yes     Reason for Call: Symptoms or Concerns     If patient has red-flag symptoms, warm transfer to triage line    Current symptom or concern: RA Flare    Symptoms have been present for:  4-5 day(s)    Has patient previously been seen for this? Yes    By Dr. Saenz

## 2021-08-04 NOTE — TELEPHONE ENCOUNTER
Contacted pt with Dr Saenz's response and recommendations. Pt verbalized understanding of these.    EBONIE MirN, RN  Rheumatology Care Coordinator  [facility]

## 2021-08-05 ENCOUNTER — TELEPHONE (OUTPATIENT)
Dept: PALLIATIVE MEDICINE | Facility: CLINIC | Age: 51
End: 2021-08-05

## 2021-08-05 NOTE — TELEPHONE ENCOUNTER
----- Message from Cassy Thompson sent at 8/5/2021  3:08 PM CDT -----  Regarding: Fax MRI Orders  Adry Rothmanissa called because Dr. Florian ordered an MRI for her, and she thought it had been faxed to the [facility] in [place] for her to complete there. Zayra was informed they had not received the order, so she would like to request that the order is refaxed. Please give Zayra a call back if there are any questions regarding the request.Thank you,Cassy

## 2021-08-13 ENCOUNTER — TRANSFERRED RECORDS (OUTPATIENT)
Dept: HEALTH INFORMATION MANAGEMENT | Facility: CLINIC | Age: 51
End: 2021-08-13

## 2021-08-19 ENCOUNTER — OFFICE VISIT (OUTPATIENT)
Dept: INTERNAL MEDICINE | Facility: CLINIC | Age: 51
End: 2021-08-19
Payer: COMMERCIAL

## 2021-08-19 VITALS
DIASTOLIC BLOOD PRESSURE: 63 MMHG | BODY MASS INDEX: 38.28 KG/M2 | OXYGEN SATURATION: 90 % | WEIGHT: 223 LBS | SYSTOLIC BLOOD PRESSURE: 96 MMHG

## 2021-08-19 DIAGNOSIS — M54.2 NECK PAIN: Primary | ICD-10-CM

## 2021-08-19 PROCEDURE — 99215 OFFICE O/P EST HI 40 MIN: CPT | Performed by: NURSE PRACTITIONER

## 2021-08-19 NOTE — NURSING NOTE
Chief Complaint   Patient presents with     Recheck Medication     Pain     shoulder pain for a month       Audie Vergara CMA, EMT at 10:08 AM on 8/19/2021.

## 2021-08-23 NOTE — PROGRESS NOTES
"S:  Zayra Ramirez is a 51 year old female presents accompanied by her father to discuss her pain.    I had referred her to Neurosurgery and she states she was told to come back when she quit smoking.     She has a history of a C3-5 ACDF performed by Dr. Monique of [facility]. This was performed in 2017. She now has a three  Month hx of a constant, with fluctuating intensities, sharp cervical pain with clearly audible crepitus of her cervical spine. Her symptoms seem to be all left sided and radiate distally to her hand in no specific distribution, but affects all fingers  She was seen by chiropractic for massage, no adjustments and underwent cervical spine xray imaging.   She then started to develop neck pain   She describes a constant, with fluctuating intensities, sharp pain with \"popping\" noises   that initiates in the left cervical region and   radiating pain to the left hand.   no specific digits, but affects all fingers.  No associated numbness/tingling, or upper extremity weakness  Right arm is asymptomatic.   She describes that Cervical rotation, to the left, seems to aggravate the symptoms, but she cannot appreciate any alleviating symptoms.  She denies changes in gait, instability, or falling episodes.    She was seen by Dr. Lucius Orellana in Neurosurgery and repeat MRI:                                                                   IMPRESSION:  1. Changes from ACDF at C3-4 and C4-5. There is nonunion of the graft  material at the interspaces at C3-4 and C4-5.  2. Fractured osteophyte along the anterior inferior margin of the C3  vertebral body on the right side. Probably subacute. There does appear  to be some mild prevertebral soft tissue swelling at this site.  Correlate clinically.  3. Screw on the right side at the C3 level affixing the plate is  fractured.  4. Less than 1 mm lucency around the screws bilaterally at C3 and on  the left at C5.  5. Multilevel degenerative changes including mild to " moderate central  spinal stenosis at C3-4.  6. Osteophytes cause neural foraminal stenosis which at C2-3 is  moderate on the right, at C3-4 is moderate to severe bilaterally, at  C4-5 is mild on the right and moderate on the left, at C5-6 severe on  the right and at C6-7 is moderate on the right.     According to chart notes:    History of prior cervical fusion, with non-fusion and hardware failure/fracture  ~Neck pain   ~Current tobacco smoker      The clinical data for general endotracheal anesthesia regarding patients who smoke is that smoking cessation needs to be a minimum of 6 weeks prior to surgery to avoid increased complication risk (due to ciliary hypomobility and decreased clearance of pulmonary secretions in initial post-cessation period) Patient needs to commit to stopping smoking for a minimum of 12 months after surgery (period of bone growth with spine fusions), although permanent smoking cessation will benefit heir overall health and decrease risk of adjacent segment degeneration in the spine.    She met with a smoking cessation counselor Kimi Perez through the Pulmonary Department at [facility] and a plan was formed to start with Nicotine patches high dose therapy with a prolonged treatment course and other support services. Zayra has not started this plan yet.     She did obtain an  OrthoFix bone stimulator but has not started using it yet.    She is concerned that the broken screw could damage a nerve in her neck and be causing the referred pain to her hand.     Patient Active Problem List   Diagnosis     Chronic midline low back pain     Facial cellulitis     Morbid (severe) obesity due to excess calories (H)     Dental abscess     Hypoxia     Subcutaneous emphysema (H)     Borderline personality disorder (H)     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)     Esophageal stricture     GERD (gastroesophageal reflux disease)     HTN (hypertension)     Interstitial lung disease (H)     Moderate  persistent asthma without complication     Onychomycosis     Restless leg syndrome     Seasonal allergies     Smoker     Stress     Respiratory bronchiolitis interstitial lung disease (H)     Spinal epidural abscess     Lumbar spondylosis     Inflammatory arthritis     Arthralgia of temporomandibular joint     Articular disc disorder of temporomandibular joint     Derangement of temporomandibular joint     Calculus of gallbladder without cholecystitis without obstruction     Displacement of articular disc of temporomandibular joint with reduction     Myofascial pain     Oral lesion     Symptomatic cholelithiasis     Sacro-iliac pain     Degenerative lumbar spinal stenosis     Chronic lumbar radiculopathy     Glucose intolerance     Chronic neck pain     Lumbar radiculopathy, chronic            Past Medical History:   Diagnosis Date     Allergic rhinitis      Anemia      Arthritis      Asthma     copd     Dental abscess 8-2015     Depressive disorder      Gastroesophageal reflux disease      History of emphysema      Hoarseness      Hypertension      Morbid obesity with BMI of 40.0-44.9, adult (H)      Obstructive sleep apnea      Other chronic pain      Respiratory bronchiolitis interstitial lung disease (H)      Sleep apnea             Past Surgical History:   Procedure Laterality Date     CERVICAL FUSION       CHOLECYSTECTOMY       COLONOSCOPY       COLONOSCOPY N/A 2/6/2020    Procedure: COLONOSCOPY, WITH POLYPECTOMY AND BIOPSY;  Surgeon: Julian Mccullough MD;  Location:  GI     ENT SURGERY       ESOPHAGOSCOPY, GASTROSCOPY, DUODENOSCOPY (EGD), COMBINED N/A 2/6/2020    Procedure: ESOPHAGOGASTRODUODENOSCOPY (EGD);  Surgeon: Julian Mccullough MD;  Location:  GI     EXCISE LESION INTRAORAL Bilateral 10/3/2018    Procedure: EXCISE LESION INTRAORAL;  Wide Local Excision Of of Left Oral Cavity Ulcer;  Surgeon: Morro Mijares MD;  [facility]      DRAIN SKIN ABSCESS SIMPLE/SINGLE  3/16/2012     Procedure:INCISION AND DRAINAGE, ABSCESS, SIMPLE; Surgeon:CHRISTIANO HANCOCK; Location: GI     HEAD & NECK SURGERY       HYSTERECTOMY       HYSTERECTOMY       INCISION AND DRAINAGE ABDOMEN WASHOUT, COMBINED       INJECT EPIDURAL LUMBAR Right 9/15/2020    Procedure: Lumbar5- sacral 1 epidural steroid injection with fluoroscopy;  Surgeon: Tram Florian MD;  [facility]     INJECT EPIDURAL LUMBAR Right 6/29/2021    Procedure: Lumbar 5 sacral 1 epidural steroid injection with fluoroscopy;  Surgeon: Tram Florian MD;  [facility]     INJECT SACROILIAC JOINT Bilateral 6/16/2020    Procedure: Bilateral sacroiliac joint steroid injection with fluoroscopy;  Surgeon: Tram Florian MD;  [facility]     LAMINECTOMY THORACIC ONE LEVEL N/A 8/19/2019    Procedure: LAMINECTOMY, SPINE, THORACIC, 11-12 and Part of Lumbar 1, DRAINAGE OF EPIDURAL ABCESS, Epidural Drain Placement X 2;  Surgeon: Yadiel Beal MD;  [facility] OR     DC PERCUT IMPLNT NEUROELECT,EPIDURAL N/A 8/8/2019    Procedure: TRIAL, SPINAL CORD STIMULATOR WITH [employer]OSTON [employer]ENT[employer];  Surgeon: Sipple, Daniel Peter, DO;  Location: [facility];  Service: Pain     RADIO FREQUENCY ABLATION / DESTRUCTION OF SACROILOAC JOINT DORSAL PRIMARY RAMUS Bilateral 12/17/2019    Procedure: Bilateral lumbar radiofrequency ablation with fluoroscopy and intravenous sedation ( Lumbar 2,3,4,5 medial branch nerves for the bilateral lumbar3-4, 4-5 and 5-sacral1 joints.;  Surgeon: Tram Florian MD;  [facility]     RADIO FREQUENCY ABLATION / DESTRUCTION OF SACROILOAC JOINT DORSAL PRIMARY RAMUS Right 11/17/2020    Procedure: Right lumbar medial branch nerve radiofrequency ablation right L2,3,4,5 nerves supplying the right L3-4, L4-5 and L5-S1 facet joints;  Surgeon: Tram Florian MD;  Location: [facility]     spinal cord stimulator  08/08/2019     spinal cord stimulator removal  08/13/2019            Social History     Tobacco Use     Smoking status:  Current Every Day Smoker     Packs/day: 1.00     Years: 30.00     Pack years: 30.00     Types: Cigarettes     Start date: 1/1/1996     Smokeless tobacco: Never Used     Tobacco comment: has tried the patch   Substance Use Topics     Alcohol use: No            Family History   Problem Relation Age of Onset     Other Cancer Father         base of tongue cancer at age ~65     Hypertension Father      Back Pain Father      Restless Leg Syndrome Father      Asthma Mother      Restless Leg Syndrome Mother      Restless Leg Syndrome Sister      Breast Cancer Maternal Aunt 55               Allergies   Allergen Reactions     Bee Venom Anaphylaxis     Bees      Doxycycline Anaphylaxis     Patient thinks it may have been just nausea and vomiting, however unable to confirm     Erythromycin Anaphylaxis and Shortness Of Breath     Other reaction(s): Vomiting     Hydrocodone-Acetaminophen Itching            Current Outpatient Medications   Medication Sig Dispense Refill     albuterol (PROAIR HFA, PROVENTIL HFA, VENTOLIN HFA) 108 (90 BASE) MCG/ACT inhaler Inhale 2 puffs into the lungs every 6 hours as needed for shortness of breath / dyspnea or wheezing (PT last dose 1.23.2020)        ARIPiprazole (ABILIFY) 15 MG tablet Take 15 mg by mouth At Bedtime        busPIRone HCl (BUSPAR) 30 MG tablet Take 15 mg by mouth At Bedtime       celecoxib (CELEBREX) 200 MG capsule Take 1 capsule (200 mg) by mouth every morning 60 capsule 4     cetirizine (ZYRTEC) 10 MG tablet Take 1 tablet (10 mg) by mouth daily 69 tablet 3     cholecalciferol ( ULTRA STRENGTH) 2000 units CAPS TAKE ONE CAPSULE BY MOUTH DAILY IN AM       cyclobenzaprine (FLEXERIL) 10 MG tablet Take 1 tablet (10 mg) by mouth daily 90 tablet 1     DULoxetine (CYMBALTA) 60 MG capsule Take 120 mg by mouth At Bedtime        EPINEPHrine (EPIPEN/ADRENACLICK/OR ANY BX GENERIC EQUIV) 0.3 MG/0.3ML injection 2-pack INJECT 0.3ML INTO THE MUSCLE ONCE AS NEEDED FOR ANAPHYLAXIS       ferrous  sulfate (FEROSUL) 325 (65 Fe) MG tablet Take 325 mg by mouth daily (with breakfast)   0     fluconazole (DIFLUCAN) 100 MG tablet Take 2 tabs by mouth on day 1, then 1 tab daily x 14 days. 15 tablet 0     fluconazole (DIFLUCAN) 200 MG tablet TAKE 1 TABLET BY MOUTH EVERY DAY FOR 14 DAYS 14 tablet 0     folic acid (FOLVITE) 1 MG tablet Take 1 tablet (1 mg) by mouth daily 90 tablet 3     lisinopril-hydrochlorothiazide (ZESTORETIC) 20-25 MG tablet Take 1 tablet by mouth daily 90 tablet 3     methotrexate sodium 2.5 MG TABS TAKE 9 TABLETS BY MOUTH ONCE WEEKLY  Labs  past due. appt past due. 108 tablet 5     montelukast (SINGULAIR) 10 MG tablet Take 10 mg by mouth At Bedtime       nystatin (MYCOSTATIN) 562439 UNIT/GM external cream Apply topically 2 times daily 30 g 3     omeprazole (PRILOSEC) 40 MG DR capsule Take 1 capsule (40 mg) by mouth daily 180 capsule 3     OXYGEN-HELIUM IN 2-3L PRN during the day, 3L @ night       pregabalin (LYRICA) 150 MG capsule Take 1 capsule (150 mg) by mouth 2 times daily 60 capsule 3     Respiratory Therapy Supplies ([facility] CPAP FILTER) MISC        rOPINIRole (REQUIP) 0.5 MG tablet Take 1 tablet (0.5 mg) by mouth At Bedtime Take 1 tab by mouth at bedtime. 90 tablet 1     vitamin C 500 MG TABS Take 500 mg by mouth daily (with lunch)        zinc sulfate (ZINCATE) 220 (50 Zn) MG capsule Take 220 mg by mouth daily (with lunch)           REVIEW OF SYSTEMS:    See above.    O:   BP 96/63 (BP Location: Right arm, Patient Position: Sitting, Cuff Size: Adult Large)   Wt 101.2 kg (223 lb)   LMP  (LMP Unknown)   SpO2 90%   BMI 38.28 kg/m    GENERAL APPEARANCE:  alert.  ENT:  She has a very raspy voice.  CV: see VS  PSYCHE: alert and oriented.   Diagnoses and all orders for this visit:    Neck pain  Today she is asking for pain medication as she is unable to sleep at night for more than 2.5 hours at a stretch. She states she lays in bed and cries due to pain.   I spent 35 minutes with  her an her father reviewing notes from Neurosurgery, reviewing Care Everywhere records and explaining that it did not sound like any Neurosurgeon would agree to perform surgery again as long as she is smoking due to failure for her last surgery to heal.   We then discussed  Maximizing medications such as Pregabalin (declines as she would be too drowsy  0.  She was giver a soft collar to wear at bedtime.    -     Neurosurgery Referral to discuss issue of whether broken screw could damage a nerve.     Total time spent today with this patient including chart review, exam time with patient and documentation : 60 minutes      Neena MAHARAJ, CNP

## 2021-08-30 ENCOUNTER — TELEPHONE (OUTPATIENT)
Dept: INTERNAL MEDICINE | Facility: CLINIC | Age: 51
End: 2021-08-30

## 2021-08-30 DIAGNOSIS — M54.2 NECK PAIN: Primary | ICD-10-CM

## 2021-08-30 ASSESSMENT — ENCOUNTER SYMPTOMS
DIFFICULTY URINATING: 1
SORE THROAT: 1
HEARTBURN: 1
POSTURAL DYSPNEA: 1
SLEEP DISTURBANCES DUE TO BREATHING: 1
FEVER: 1
WHEEZING: 1
BOWEL INCONTINENCE: 0
COUGH DISTURBING SLEEP: 1
WEAKNESS: 1
TREMORS: 1
POOR WOUND HEALING: 0
TINGLING: 1
DYSPNEA ON EXERTION: 1
BLOOD IN STOOL: 0
NUMBNESS: 1
CONSTIPATION: 0
NAUSEA: 1
EXERCISE INTOLERANCE: 1
HALLUCINATIONS: 0
ABDOMINAL PAIN: 1
POLYDIPSIA: 1
ALTERED TEMPERATURE REGULATION: 1
SKIN CHANGES: 0
WEIGHT LOSS: 1
STIFFNESS: 1
EYE PAIN: 0
EYE IRRITATION: 0
LOSS OF CONSCIOUSNESS: 0
NIGHT SWEATS: 1
EYE REDNESS: 0
SHORTNESS OF BREATH: 1
TASTE DISTURBANCE: 1
DYSURIA: 0
DIARRHEA: 1
BACK PAIN: 1
DOUBLE VISION: 1
DECREASED APPETITE: 1
LIGHT-HEADEDNESS: 1
WEIGHT GAIN: 0
SINUS PAIN: 0
BLOATING: 0
NECK MASS: 1
SPUTUM PRODUCTION: 1
INSOMNIA: 0
SPEECH CHANGE: 0
PALPITATIONS: 0
EYE WATERING: 0
PANIC: 1
FATIGUE: 1
JAUNDICE: 0
ARTHRALGIAS: 1
HOARSE VOICE: 1
DEPRESSION: 1
PARALYSIS: 0
JOINT SWELLING: 1
DISTURBANCES IN COORDINATION: 0
NERVOUS/ANXIOUS: 1
TROUBLE SWALLOWING: 1
BRUISES/BLEEDS EASILY: 0
HYPERTENSION: 1
HYPOTENSION: 0
INCREASED ENERGY: 1
HEMATURIA: 0
DECREASED CONCENTRATION: 1
ORTHOPNEA: 1
HEADACHES: 1
FLANK PAIN: 0
SINUS CONGESTION: 0
LEG PAIN: 1
MEMORY LOSS: 1
SEIZURES: 0
POLYPHAGIA: 0
RECTAL PAIN: 0
NECK PAIN: 1
HEMOPTYSIS: 0
NAIL CHANGES: 1
SWOLLEN GLANDS: 0
SMELL DISTURBANCE: 1
MYALGIAS: 1
DIZZINESS: 1
MUSCLE CRAMPS: 1
CHILLS: 1
COUGH: 1
SNORES LOUDLY: 1
SYNCOPE: 0

## 2021-08-30 NOTE — TELEPHONE ENCOUNTER
[facility] Call Center    Phone Message    May a detailed message be left on voicemail: yes     Reason for Call: Order(s): Other: needs a referral to ortho- Neck surgeon  Reason for requested: Ortho clinic told pt she needs this  Date needed: ASAP  Provider name: Neena Tam NP      Action Taken: Message routed to:  [facility]    Travel Screening: Not Applicable

## 2021-08-31 ENCOUNTER — APPOINTMENT (OUTPATIENT)
Dept: CT IMAGING | Facility: CLINIC | Age: 51
End: 2021-08-31
Attending: EMERGENCY MEDICINE
Payer: COMMERCIAL

## 2021-08-31 ENCOUNTER — HOSPITAL ENCOUNTER (EMERGENCY)
Facility: CLINIC | Age: 51
Discharge: HOME | End: 2021-09-01
Attending: EMERGENCY MEDICINE | Admitting: EMERGENCY MEDICINE
Payer: COMMERCIAL

## 2021-08-31 ENCOUNTER — OFFICE VISIT (OUTPATIENT)
Dept: OTOLARYNGOLOGY | Facility: CLINIC | Age: 51
End: 2021-08-31
Attending: NURSE PRACTITIONER
Payer: COMMERCIAL

## 2021-08-31 VITALS
BODY MASS INDEX: 37.56 KG/M2 | HEIGHT: 64 IN | TEMPERATURE: 98.3 F | DIASTOLIC BLOOD PRESSURE: 80 MMHG | WEIGHT: 220 LBS | HEART RATE: 71 BPM | SYSTOLIC BLOOD PRESSURE: 137 MMHG | OXYGEN SATURATION: 96 % | RESPIRATION RATE: 18 BRPM

## 2021-08-31 VITALS
HEIGHT: 64 IN | SYSTOLIC BLOOD PRESSURE: 96 MMHG | OXYGEN SATURATION: 95 % | HEART RATE: 89 BPM | WEIGHT: 222 LBS | BODY MASS INDEX: 37.9 KG/M2 | TEMPERATURE: 96.9 F | DIASTOLIC BLOOD PRESSURE: 65 MMHG

## 2021-08-31 DIAGNOSIS — H53.2 DOUBLE VISION: ICD-10-CM

## 2021-08-31 DIAGNOSIS — J38.1 VOCAL CORD POLYPS: Primary | ICD-10-CM

## 2021-08-31 DIAGNOSIS — R42 DIZZINESS: ICD-10-CM

## 2021-08-31 LAB
ALBUMIN SERPL-MCNC: 3.4 G/DL (ref 3.4–5)
ALP SERPL-CCNC: 76 U/L (ref 40–150)
ALT SERPL W P-5'-P-CCNC: 19 U/L (ref 0–50)
ANION GAP SERPL CALCULATED.3IONS-SCNC: 3 MMOL/L (ref 3–14)
AST SERPL W P-5'-P-CCNC: 18 U/L (ref 0–45)
ATRIAL RATE - MUSE: 64 BPM
ATRIAL RATE - MUSE: 71 BPM
BASOPHILS # BLD AUTO: 0.1 10E3/UL (ref 0–0.2)
BASOPHILS NFR BLD AUTO: 1 %
BILIRUB SERPL-MCNC: 0.4 MG/DL (ref 0.2–1.3)
BUN SERPL-MCNC: 23 MG/DL (ref 7–30)
CALCIUM SERPL-MCNC: 8.7 MG/DL (ref 8.5–10.1)
CHLORIDE BLD-SCNC: 100 MMOL/L (ref 94–109)
CO2 SERPL-SCNC: 31 MMOL/L (ref 20–32)
CREAT SERPL-MCNC: 1.36 MG/DL (ref 0.52–1.04)
DIASTOLIC BLOOD PRESSURE - MUSE: NORMAL MMHG
DIASTOLIC BLOOD PRESSURE - MUSE: NORMAL MMHG
EOSINOPHIL # BLD AUTO: 0.4 10E3/UL (ref 0–0.7)
EOSINOPHIL NFR BLD AUTO: 3 %
ERYTHROCYTE [DISTWIDTH] IN BLOOD BY AUTOMATED COUNT: 14.1 % (ref 10–15)
GFR SERPL CREATININE-BSD FRML MDRD: 45 ML/MIN/1.73M2
GLUCOSE BLD-MCNC: 86 MG/DL (ref 70–99)
HCT VFR BLD AUTO: 39.9 % (ref 35–47)
HGB BLD-MCNC: 13.2 G/DL (ref 11.7–15.7)
HOLD SPECIMEN: NORMAL
IMM GRANULOCYTES # BLD: 0 10E3/UL
IMM GRANULOCYTES NFR BLD: 0 %
INTERPRETATION ECG - MUSE: NORMAL
INTERPRETATION ECG - MUSE: NORMAL
LYMPHOCYTES # BLD AUTO: 2.9 10E3/UL (ref 0.8–5.3)
LYMPHOCYTES NFR BLD AUTO: 27 %
MCH RBC QN AUTO: 32.4 PG (ref 26.5–33)
MCHC RBC AUTO-ENTMCNC: 33.1 G/DL (ref 31.5–36.5)
MCV RBC AUTO: 98 FL (ref 78–100)
MONOCYTES # BLD AUTO: 0.7 10E3/UL (ref 0–1.3)
MONOCYTES NFR BLD AUTO: 7 %
NEUTROPHILS # BLD AUTO: 6.6 10E3/UL (ref 1.6–8.3)
NEUTROPHILS NFR BLD AUTO: 62 %
NRBC # BLD AUTO: 0 10E3/UL
NRBC BLD AUTO-RTO: 0 /100
P AXIS - MUSE: 65 DEGREES
P AXIS - MUSE: 68 DEGREES
PLATELET # BLD AUTO: 425 10E3/UL (ref 150–450)
POTASSIUM BLD-SCNC: 3.2 MMOL/L (ref 3.4–5.3)
PR INTERVAL - MUSE: 162 MS
PR INTERVAL - MUSE: 166 MS
PROT SERPL-MCNC: 7.4 G/DL (ref 6.8–8.8)
QRS DURATION - MUSE: 82 MS
QRS DURATION - MUSE: 86 MS
QT - MUSE: 400 MS
QT - MUSE: 416 MS
QTC - MUSE: 429 MS
QTC - MUSE: 434 MS
R AXIS - MUSE: 55 DEGREES
R AXIS - MUSE: 64 DEGREES
RBC # BLD AUTO: 4.07 10E6/UL (ref 3.8–5.2)
SODIUM SERPL-SCNC: 134 MMOL/L (ref 133–144)
SYSTOLIC BLOOD PRESSURE - MUSE: NORMAL MMHG
SYSTOLIC BLOOD PRESSURE - MUSE: NORMAL MMHG
T AXIS - MUSE: 46 DEGREES
T AXIS - MUSE: 67 DEGREES
VENTRICULAR RATE- MUSE: 64 BPM
VENTRICULAR RATE- MUSE: 71 BPM
WBC # BLD AUTO: 10.7 10E3/UL (ref 4–11)

## 2021-08-31 PROCEDURE — 70498 CT ANGIOGRAPHY NECK: CPT | Mod: 26 | Performed by: STUDENT IN AN ORGANIZED HEALTH CARE EDUCATION/TRAINING PROGRAM

## 2021-08-31 PROCEDURE — 250N000011 HC RX IP 250 OP 636: Performed by: STUDENT IN AN ORGANIZED HEALTH CARE EDUCATION/TRAINING PROGRAM

## 2021-08-31 PROCEDURE — 99213 OFFICE O/P EST LOW 20 MIN: CPT | Mod: 25 | Performed by: OTOLARYNGOLOGY

## 2021-08-31 PROCEDURE — 93010 ELECTROCARDIOGRAM REPORT: CPT | Performed by: EMERGENCY MEDICINE

## 2021-08-31 PROCEDURE — 85004 AUTOMATED DIFF WBC COUNT: CPT | Performed by: EMERGENCY MEDICINE

## 2021-08-31 PROCEDURE — 70450 CT HEAD/BRAIN W/O DYE: CPT

## 2021-08-31 PROCEDURE — 70496 CT ANGIOGRAPHY HEAD: CPT | Mod: 26 | Performed by: STUDENT IN AN ORGANIZED HEALTH CARE EDUCATION/TRAINING PROGRAM

## 2021-08-31 PROCEDURE — 82040 ASSAY OF SERUM ALBUMIN: CPT | Performed by: EMERGENCY MEDICINE

## 2021-08-31 PROCEDURE — 36415 COLL VENOUS BLD VENIPUNCTURE: CPT | Performed by: EMERGENCY MEDICINE

## 2021-08-31 PROCEDURE — 99285 EMERGENCY DEPT VISIT HI MDM: CPT | Mod: 25 | Performed by: EMERGENCY MEDICINE

## 2021-08-31 PROCEDURE — 31575 DIAGNOSTIC LARYNGOSCOPY: CPT | Performed by: OTOLARYNGOLOGY

## 2021-08-31 PROCEDURE — 99207 CT HEAD W/O CONTRAST: CPT | Mod: 26 | Performed by: STUDENT IN AN ORGANIZED HEALTH CARE EDUCATION/TRAINING PROGRAM

## 2021-08-31 PROCEDURE — 70498 CT ANGIOGRAPHY NECK: CPT

## 2021-08-31 PROCEDURE — 250N000013 HC RX MED GY IP 250 OP 250 PS 637: Performed by: EMERGENCY MEDICINE

## 2021-08-31 PROCEDURE — 999N000127 HC STATISTIC PERIPHERAL IV START W US GUIDANCE

## 2021-08-31 PROCEDURE — 93005 ELECTROCARDIOGRAM TRACING: CPT | Performed by: EMERGENCY MEDICINE

## 2021-08-31 RX ORDER — IOPAMIDOL 755 MG/ML
75 INJECTION, SOLUTION INTRAVASCULAR ONCE
Status: COMPLETED | OUTPATIENT
Start: 2021-08-31 | End: 2021-08-31

## 2021-08-31 RX ORDER — MECLIZINE HYDROCHLORIDE 25 MG/1
25 TABLET ORAL ONCE
Status: COMPLETED | OUTPATIENT
Start: 2021-08-31 | End: 2021-08-31

## 2021-08-31 RX ORDER — POTASSIUM CHLORIDE 20MEQ/15ML
20 LIQUID (ML) ORAL ONCE
Status: COMPLETED | OUTPATIENT
Start: 2021-08-31 | End: 2021-09-01

## 2021-08-31 RX ADMIN — IOPAMIDOL 75 ML: 755 INJECTION, SOLUTION INTRAVENOUS at 19:23

## 2021-08-31 RX ADMIN — MECLIZINE HYDROCHLORIDE 25 MG: 25 TABLET ORAL at 21:09

## 2021-08-31 ASSESSMENT — MIFFLIN-ST. JEOR
SCORE: 1611.99
SCORE: 1602.91

## 2021-08-31 ASSESSMENT — PAIN SCALES - GENERAL: PAINLEVEL: NO PAIN (0)

## 2021-08-31 NOTE — ED TRIAGE NOTES
51YR female patient - presenting to ED for eval of intermittent (over one week's time) double vision issues and dizziness.  Both issues worse since waking today (6:15am); also claims blurriness.  Patient states neck and lower back chronic pains.

## 2021-08-31 NOTE — TELEPHONE ENCOUNTER
SPINE PATIENTS - NEW PROTOCOL PREVISIT    RECORDS RECEIVED FROM: Internal   REASON FOR VISIT: Neck pain/CT/low back pain   Date of Appt: 9/3/21   NOTES (FOR ALL VISITS) STATUS DETAILS   OFFICE NOTE from referring provider Internal Neena Tam NP @ [facility] Primary Care:  8/19/21   OFFICE NOTE from other specialist Care Everywhere/internal Dr Rylan Monique @ [facility]:  6/2/21    Dr Cara Ribera DNP @ [facility] Neurosurgery:  4/30/21   DISCHARGE SUMMARY from hospital N/A    DISCHARGE REPORT from ER N/A    EMG REPORT N/A    MEDICATION LIST Internal    IMAGING  (FOR ALL VISITS)     MRI (HEAD, NECK, SPINE) N/A    XRAY (SPINE) *NEUROSURGERY* Internal [facility]:  XR Cervical Spine 4/13/21    [facility]:  XR Cervical Spine 4/8/21   CT (HEAD, NECK, SPINE) Internal [facility]:  CT Cervical Spine 4/21/21

## 2021-08-31 NOTE — PATIENT INSTRUCTIONS
1. Pleases schedule speech therapy with [facility] voice clinic.  2. Please call the ENT clinic with any questions,concerns, new or worsening symptoms.    -Clinic number is [phone]   - Lynn's direct line (Dr. Mijares's nurse) [phone]

## 2021-08-31 NOTE — PROGRESS NOTES
"  Otolaryngology Clinic    Name: Zayra Ramirez  MRN: [id number]  Age: 51 year old  : 2021      Chief Complaint:   Follow up     History of Present Illness:   Zayra Ramirez is a 51 year old female with a history of an aphthous ulcer of the mouth who presents for follow up. The patient had a left-sided chronic ulcer in her mouth that did not heal for a long time, but was reported healing at her last visit (01/15/2019).     Today, she reports her recent bout of symptoms started this past March. She reports improved itchiness in her throat, but is having persistent pain. The patient notes that she has decreased her smoking tobacco use to 1/2 of what she used to use.    Review of Systems:   Pertinent items are noted in HPI or as in patient entered ROS below, remainder of complete ROS is negative.    ENT ROS 2021   Constitutional Weight loss, Unexplained fever or night sweats   Neurology Dizzy spells, Numbness   Psychology Frequently feeling depressed or sad, Frequently feeling anxious   Eyes -   Ears, Nose, Throat Sore throat, Trouble swallowing, Hoarseness   Cardiopulmonary Breathing problems, Wheezing   Gastrointestinal/Genitourinary Heartburn/indigestion   Musculoskeletal Sore or stiff joints, Swollen joints, Back pain, Neck pain   Allergy/Immunology Allergies or hay fever, Skin changes, Rash   Hematologic -   Endocrine Thirst   Skin -   Other Rash      Physical Exam:   BP 96/65 (BP Location: Right arm, Patient Position: Sitting, Cuff Size: Adult Large)   Pulse 89   Temp 96.9  F (36.1  C) (Temporal)   Ht 1.626 m (5' 4\")   Wt 100.7 kg (222 lb)   LMP  (LMP Unknown)   SpO2 95%   BMI 38.11 kg/m       PHYSICAL EXAMINATION:    Constitutional:  The patient was accompanied by her , well-groomed, and in no acute distress.    Skin:  Warm and pink.    Neurologic:  Alert and oriented x 3.  CN's III-XII within normal limits.  Voice normal.   Psychiatric:  The patient's affect was calm, " cooperative, and appropriate. Respiratory:  Breathing comfortably without stridor or exertion of accessory muscles.    Eyes: Extraocular movement intact.    Head:  Normocephalic and atraumatic.  No lesions or scars.    Nose:  Sinuses were non-tender.  Anterior rhinoscopy revealed midline septum and absence of purulence or polyps.    OC/OP:  Normal tongue, floor of mouth, buccal mucosa, and palate.  No lesions or masses on inspection or palpation.  No abnormal lymph tissue in the oropharynx.  The pterygoid region is non-tender.    Neck:  Supple with normal laryngeal and tracheal landmarks.  The parotid beds were without masses.  No palpable thyroid.  Lymphatic:  There is no palpable lymphadenopathy in the neck.      Fiberoptic Endoscopy:  Consent for fiberoptic laryngoscopy was obtained, and we confirmed correctness of procedure and identity of patient.  Fiberoptic laryngoscopy was indicated due to history of oral thrush and ulcers.  The nose was topically decongested and anesthetized.  The fiberoptic laryngoscope was passed under endoscopic vision.  The turbinates were normal.  The inferior and middle meati were clear bilaterally without purulence, masses, or polyps.  The nasopharynx was clear.  The Eustachian tubes were clear.  The soft palate appeared normal with good mobility.  The epiglottis was sharp and the visualized portion of the vallecula was clear.  Persistent vocal cord polyps around the larynx, but are healing.  The arytenoids were clear and there was no pooling in the hypopharynx.      Assessment and Plan:    ICD-10-CM    1. Vocal cord polyps  J38.1 [facility] VOICE CLINIC REFERRAL     Zayra Ramirez is a 51 year old female with a history of an aphthous ulcer of the mouth who presents for follow up. Her exam shows vocal cord polyps, improved oral thrush, and there is no evidence of any precancer. I encouraged her to start with SLP, and I will send a referral for this in our system. I am pleased with her  decreased in tobacco use, and encouraged her to be persistent with this. We discussed continuing her allergy medications as she already has been doing.    Follow-up: No follow-ups on file.      Scribe Disclosure:  I, Marley Mcgee, am serving as a scribe to document services personally performed by Morro Mijares MD at this visit, based upon the provider's statements to me. All documentation has been reviewed by the aforementioned provider prior to being entered into the official medical record.

## 2021-08-31 NOTE — LETTER
"2021       RE: Zayra Ramirez  [address] [place] [address]     Dear Colleague,    Thank you for referring your patient, Zayra Ramirez, to the [facility] [place] [facility]. Please see a copy of my visit note below.      Otolaryngology Clinic    Name: Zayra Ramirez  MRN: [id number]  Age: 51 year old  : 2021      Chief Complaint:   Follow up     History of Present Illness:   Zayra Ramirez is a 51 year old female with a history of an aphthous ulcer of the mouth who presents for follow up. The patient had a left-sided chronic ulcer in her mouth that did not heal for a long time, but was reported healing at her last visit (01/15/2019).     Today, she reports her recent bout of symptoms started this past March. She reports improved itchiness in her throat, but is having persistent pain. The patient notes that she has decreased her smoking tobacco use to 1/2 of what she used to use.    Review of Systems:   Pertinent items are noted in HPI or as in patient entered ROS below, remainder of complete ROS is negative.   [facility] E[facility] ROS 2021   Constitutional Weight loss, Unexplained fever or night sweats   Neurology Dizzy spells, Numbness   Psychology Frequently feeling depressed or sad, Frequently feeling anxious   Eyes -   Ears, Nose, Throat Sore throat, Trouble swallowing, Hoarseness   Cardiopulmonary Breathing problems, Wheezing   Gastrointestinal/Genitourinary Heartburn/indigestion   Musculoskeletal Sore or stiff joints, Swollen joints, Back pain, Neck pain   Allergy/Immunology Allergies or hay fever, Skin changes, Rash   Hematologic -   Endocrine Thirst   Skin -   Other Rash      Physical Exam:   BP 96/65 (BP Location: Right arm, Patient Position: Sitting, Cuff Size: Adult Large)   Pulse 89   Temp 96.9  F (36.1  C) (Temporal)   Ht 1.626 m (5' 4\")   Wt 100.7 kg (222 lb)   LMP  (LMP " Unknown)   SpO2 95%   BMI 38.11 kg/m       PHYSICAL EXAMINATION:    Constitutional:  The patient was accompanied by her , well-groomed, and in no acute distress.    Skin:  Warm and pink.    Neurologic:  Alert and oriented x 3.  CN's III-XII within normal limits.  Voice normal.   Psychiatric:  The patient's affect was calm, cooperative, and appropriate. Respiratory:  Breathing comfortably without stridor or exertion of accessory muscles.    Eyes: Extraocular movement intact.    Head:  Normocephalic and atraumatic.  No lesions or scars.    Nose:  Sinuses were non-tender.  Anterior rhinoscopy revealed midline septum and absence of purulence or polyps.    OC/OP:  Normal tongue, floor of mouth, buccal mucosa, and palate.  No lesions or masses on inspection or palpation.  No abnormal lymph tissue in the oropharynx.  The pterygoid region is non-tender.    Neck:  Supple with normal laryngeal and tracheal landmarks.  The parotid beds were without masses.  No palpable thyroid.  Lymphatic:  There is no palpable lymphadenopathy in the neck.      Fiberoptic Endoscopy:  Consent for fiberoptic laryngoscopy was obtained, and we confirmed correctness of procedure and identity of patient.  Fiberoptic laryngoscopy was indicated due to history of oral thrush and ulcers.  The nose was topically decongested and anesthetized.  The fiberoptic laryngoscope was passed under endoscopic vision.  The turbinates were normal.  The inferior and middle meati were clear bilaterally without purulence, masses, or polyps.  The nasopharynx was clear.  The Eustachian tubes were clear.  The soft palate appeared normal with good mobility.  The epiglottis was sharp and the visualized portion of the vallecula was clear.  Persistent vocal cord polyps around the larynx, but are healing.  The arytenoids were clear and there was no pooling in the hypopharynx.      Assessment and Plan:    ICD-10-CM    1. Vocal cord polyps  J38.1 [facility]  REFERRAL     Zayra Ramirez is a 51 year old female with a history of an aphthous ulcer of the mouth who presents for follow up. Her exam shows vocal cord polyps, improved oral thrush, and there is no evidence of any precancer. I encouraged her to start with SLP, and I will send a referral for this in our system. I am pleased with her decreased in tobacco use, and encouraged her to be persistent with this. We discussed continuing her allergy medications as she already has been doing.    Follow-up: No follow-ups on file.      Scribe Disclosure:  I, Marley Mcgee, am serving as a scribe to document services personally performed by Morro Mijares MD at this visit, based upon the provider's statements to me. All documentation has been reviewed by the aforementioned provider prior to being entered into the official medical record.         Again, thank you for allowing me to participate in the care of your patient.      Sincerely,    Morro Mijares MD

## 2021-09-01 ENCOUNTER — TELEPHONE (OUTPATIENT)
Dept: OPHTHALMOLOGY | Facility: CLINIC | Age: 51
End: 2021-09-01

## 2021-09-01 PROCEDURE — 250N000013 HC RX MED GY IP 250 OP 250 PS 637: Performed by: EMERGENCY MEDICINE

## 2021-09-01 RX ORDER — MECLIZINE HCL 12.5 MG 12.5 MG/1
12.5 TABLET ORAL 4 TIMES DAILY PRN
Qty: 30 TABLET | Refills: 0 | Status: SHIPPED | OUTPATIENT
Start: 2021-09-01 | End: 2021-09-21

## 2021-09-01 RX ADMIN — POTASSIUM CHLORIDE 20 MEQ: 20 SOLUTION ORAL at 00:23

## 2021-09-01 NOTE — CONSULTS
"  OPHTHALMOLOGY CONSULT NOTE  08/31/21    Patient: Zayra Ramirez  Consulted by: Ed  Reason for Consult: Intermittent diplopia    HISTORY OF PRESENTING ILLNESS:     Zayra Ramirez is a 51 year old female who presents with intermittent diplopia.     About one month ago she noticed that her eyes seemed to be strained. About a week ago she started noticing that she was \"off balance\" when trying to walk, hitting walls in the mornings after waking up. It was around this time, 1 week ago, that she believes she started noticing seeing double. At first it was only in the mornings that she noticed this. Throughout the week, the double vision has seemed to occur more frequent and lasts longer. Early in the week it had only been present for a few seconds and then would resolve. Yesterday she was driving when all of a sudden everything in her vision became double and stayed this way for about 15 minutes while she was driving home, and she had to close an eye to drive. The rest of yesterday night, the diplopia \"came on and off.\" Today her vision has been blurry and double for most of the day. Today the vision is more often double than single. She states they are \"2 perfect images,\" and are side by side, possibly with one image slightly higher than the other but not a significant vertical component. The vision doesn't seem to move or spin. Her vision has also been intermittently blurry in both eyes since yesterday.     She has not had any headaches recently. She reports being sick on and off since March, about every 3 weeks. She has had multiple recent illnesses, and states she had \"thrush\" in her throat for a long time, had some severe reactions to her COVID shots recently. She had a fever last week with a severe diarrhea illness on Monday, and was sick in bed for about 5 days. Around the time when she started to feel that she was recovering is when she noticed the double vision more frequently. She has noticed a \"drum " "pounding\" in her ears at night with her heart beat. She has eye dryness and irritation with scratchy feelings in both eyes. That has seemed bad all summer, and she has seasonal allergies. No vision loss, no eye pain, no headache, no jaw claudication. She has been losing weight since this Winter, which she attributes to outdoor gardening and being more active when on the correct depression medication. She also attributes her weight loss to being sick with diarrhea \"every 3 weeks.\" She has had a fever with a recent diarrheal illness. She has a spot on the top of her head that feels \"different\", but no scalp pain or tenderness. She has neck and shoulder pain and stiffness, and the right shoulder has been acting up the past 3 weeks, no pain in the hips. No photophobia. No pain with eye movement. No flashes or floaters in the vision. No focal weakness or numbness. No recent head or eye trauma.     Review of systems were otherwise negative except for that which has been stated above.      OCULAR/MEDICAL/SURGICAL HISTORIES:     Past Ocular History:  Last eye exam: About 5 months ago at \"[facility] in [place]\".   Prior eye surgery/laser: None  Contact lens wear: None  Glasses: Bifocals, but rarely wears these for distance. She wears reading glasses for near vision.   Eyedrops: None    - Has history of dry eyes.     Past Medical History:  - HTN  - Asthma  - History of epidural abscess from an infection from her spinal stimulator in 2019. She had an emergency surgery for this abscess.   - Multiple back and neck surgeries for back/neck pain.   - No history of strokes.   - No T2DM.     Family History:  - No glaucoma or macular degeneration.     Social History:  - Lives in [place].     Past Medical History:   Diagnosis Date     Allergic rhinitis      Anemia      Arthritis      Asthma     copd     Dental abscess 8-2015     Depressive disorder      Gastroesophageal reflux disease      History of emphysema      Hoarseness  "     Hypertension      Morbid obesity with BMI of 40.0-44.9, adult (H)      Obstructive sleep apnea      Other chronic pain      Respiratory bronchiolitis interstitial lung disease (H)      Sleep apnea        Past Surgical History:   Procedure Laterality Date     CERVICAL FUSION       CHOLECYSTECTOMY       COLONOSCOPY       COLONOSCOPY N/A 2/6/2020    Procedure: COLONOSCOPY, WITH POLYPECTOMY AND BIOPSY;  Surgeon: Julian Mcculluogh MD;  Location:  GI     ENT SURGERY       ESOPHAGOSCOPY, GASTROSCOPY, DUODENOSCOPY (EGD), COMBINED N/A 2/6/2020    Procedure: ESOPHAGOGASTRODUODENOSCOPY (EGD);  Surgeon: Julian Mccullough MD;  Location:  GI     EXCISE LESION INTRAORAL Bilateral 10/3/2018    Procedure: EXCISE LESION INTRAORAL;  Wide Local Excision Of of Left Oral Cavity Ulcer;  Surgeon: Morro Mijares MD;  Location: [facility] OR     HC DRAIN SKIN ABSCESS SIMPLE/SINGLE  3/16/2012    Procedure:INCISION AND DRAINAGE, ABSCESS, SIMPLE; Surgeon:CHRISTIANO HANCOCK; Location: GI     HEAD & NECK SURGERY       HYSTERECTOMY       HYSTERECTOMY       INCISION AND DRAINAGE ABDOMEN WASHOUT, COMBINED       INJECT EPIDURAL LUMBAR Right 9/15/2020    Procedure: Lumbar5- sacral 1 epidural steroid injection with fluoroscopy;  Surgeon: Tram Florian MD;  Location: [facility]     INJECT EPIDURAL LUMBAR Right 6/29/2021    Procedure: Lumbar 5 sacral 1 epidural steroid injection with fluoroscopy;  Surgeon: Tram Florian MD;  Location: [facility] OR     INJECT SACROILIAC JOINT Bilateral 6/16/2020    Procedure: Bilateral sacroiliac joint steroid injection with fluoroscopy;  Surgeon: Tram Florian MD;  Location: [facility] OR     LAMINECTOMY THORACIC ONE LEVEL N/A 8/19/2019    Procedure: LAMINECTOMY, SPINE, THORACIC, 11-12 and Part of Lumbar 1, DRAINAGE OF EPIDURAL ABCESS, Epidural Drain Placement X 2;  Surgeon: Yadiel Beal MD;  Location: [facility] OR     MS PERCUT IMPLNT NEUROELECT,EPIDURAL N/A 8/8/2019    Procedure: TRIAL, SPINAL CORD  STIMULATOR WITH [employer];  Surgeon: Sipple, Daniel Peter, DO;  Location: [facility];  Service: Pain     RADIO FREQUENCY ABLATION / DESTRUCTION OF SACROILOAC JOINT DORSAL PRIMARY RAMUS Bilateral 12/17/2019    Procedure: Bilateral lumbar radiofrequency ablation with fluoroscopy and intravenous sedation ( Lumbar 2,3,4,5 medial branch nerves for the bilateral lumbar3-4, 4-5 and 5-sacral1 joints.;  Surgeon: Tram Florian MD;  Location:  OR     RADIO FREQUENCY ABLATION / DESTRUCTION OF SACROILOAC JOINT DORSAL PRIMARY RAMUS Right 11/17/2020    Procedure: Right lumbar medial branch nerve radiofrequency ablation right L2,3,4,5 nerves supplying the right L3-4, L4-5 and L5-S1 facet joints;  Surgeon: Tram Florian MD;  [place] [facility] [place] stimulator  08/08/2019     spinal cord stimulator removal  08/13/2019       EXAMINATION:     Base Eye Exam     Visual Acuity       Right Left    Near cc 20/20 -1 20/20 -2          Tonometry (Tonopen, 10:18 PM)       Right Left    Pressure 11 11          Pupils       Shape React APD    Right Round Brisk None    Left Round Brisk None          Visual Fields (Counting fingers)       Left Right     Full Full          Extraocular Movement       Right Left     Full Full          Neuro/Psych     Oriented x3: Yes          Dilation     Both eyes: 1.0% Mydriacyl, 2.5% Rupert Synephrine @ 10:20 PM            Additional Tests     Color       Right Left    Ish[place]ara 11/11 11/11            Slit Lamp and Fundus Exam     External Exam       Right Left    External Normal Normal          Slit Lamp Exam       Right Left    Lids/Lashes Normal, no ptosis Normal, no ptosis    Conjunctiva/Sclera White and quiet White and quiet    Cornea 1+ PEEs 1+ PEEs    Anterior Chamber Deep and quiet Deep and quiet    Iris Round and reactive Round and reactive    Lens 1+NS 1+NS    Vitreous Normal Normal          Fundus Exam       Right Left    Disc Normal, no pallor or edema Normal, no pallor or edema     C/D Ratio 0.2 0.2    Macula Normal Normal    Vessels Tortuous vessels Tortuous vessels    Periphery Normal Normal              Cover uncover test: When right eye is covered and then uncovered, right eye is found abducted, and patient is able to return it the point of fixation upon prompting. When the left eye is covered and then uncovered, it also is found abducted, and patient is able to return it to the point of fixation upon prompting. No preference is seen between the eyes.        Labs/Studies/Imaging Performed:  CT Head without contrast 8/31/21:  Impression:  No acute intracranial pathology.     CTA Head and Neck with contrast 8/31/21:  Impression:    1. Head CTA demonstrates no aneurysm or stenosis of the major  intracranial arteries. The tip of the basilar artery is diminutive,  likely variational as posterior circulation is predominantly supplied  via communicating arteries.  2. Neck CTA demonstrates no stenosis of the major cervical arteries.     ASSESSMENT/PLAN:     Zayra Ramirez is a 51 year old female who presents with:    # Intermittent alternating exotropia with poor fusional control  On cover uncover testing patient has intermittent alternating exotropia with poor fusional control. Patient is most likely experiencing new intermittent diplopia from this decompensating in the setting of recent illnesses and extreme fatigue. Would expect this to improve with resolution of systemic issues. No 6th or 3rd nerve palsy as patient has full ocular motility. No aneurysm on imaging. Patient low risk for GCA based on age, lack of headache, no jaw claudication. No afferent pupillary defect, loss of color vision, or pain with eye movement to suggest optic neuritis. Myasthenia considered, no ptosis on exam.   Recommendations:  - Recommend ED workup and follow-up with primary care for systemic concerns and management of recent illnesses.   - Follow-up in acute eye clinic in about 1 week for recheck.   - Return  precautions discussed.     # Tortuous vessels, concern for hypertensive-related retinal vascular changes  Fundus exam notable for tortuous vessels in both eyes, without hemorrhages or other abnormalities. This may be associated with patient's hypertension, currently controlled on medication.   Recommendations:  - Continued blood pressure control with regular PCP visits.   - Recommend continued regular eye exams (patient is established with outside ophthalmologist).     It is our pleasure to participate in this patient's care and treatment. Please contact us with any further questions or concerns.    Discussed with Dr. Baker.    Rik Drummond MD  Ophthalmology Resident, PGY-2  Pager: [phone]    Teaching Statement  I did not examine the patient, but was available to see the patient if needed. I have discussed the case with the resident and the assessment and plan as documented seems reasonable to me.      Elizabeth Luis MD  Comprehensive Ophthalmology & Ocular Pathology  Department of Ophthalmology and Visual Neurosciences  [email or web address]  Pager [phone]

## 2021-09-01 NOTE — DISCHARGE INSTRUCTIONS
Continue with scheduled follow-up with Ophthalmology as needed.     Please make an appointment to follow up with Neurology Clinic (phone: [phone]) as needed.     Return to the emergency department if symptoms continue, get worse, there are any new symptoms or any cause for concern.

## 2021-09-01 NOTE — ED PROVIDER NOTES
ED Provider Note  [facility]      History     Chief Complaint   Patient presents with     Dizziness     Eye Problem     double-vision, blurriness (intermittent x 1 week)     HPI  Zayra Ramirez is a 51 year old female who presents with double vision.  She states that this has been intermittent for the past several days.  She does not know exactly when this began as it was gradual.  She also notes dizziness and doing off balance as a result.  Episodes have been longer lasting.  She also notes possible blurred vision today as well.  She has not had similar occurrences in the past.  No known precipitating factors but she has had multiple recent episodes of illness.  No other symptoms noted.    Past Medical History  Past Medical History:   Diagnosis Date     Allergic rhinitis      Anemia      Arthritis      Asthma     copd     Dental abscess 8-2015     Depressive disorder      Gastroesophageal reflux disease      History of emphysema      Hoarseness      Hypertension      Morbid obesity with BMI of 40.0-44.9, adult (H)      Obstructive sleep apnea      Other chronic pain      Respiratory bronchiolitis interstitial lung disease (H)      Sleep apnea      Past Surgical History:   Procedure Laterality Date     CERVICAL FUSION       CHOLECYSTECTOMY       COLONOSCOPY       COLONOSCOPY N/A 2/6/2020    Procedure: COLONOSCOPY, WITH POLYPECTOMY AND BIOPSY;  Surgeon: Julian Mccullough MD;  Location:  GI     ENT SURGERY       ESOPHAGOSCOPY, GASTROSCOPY, DUODENOSCOPY (EGD), COMBINED N/A 2/6/2020    Procedure: ESOPHAGOGASTRODUODENOSCOPY (EGD);  Surgeon: Julina Mccullough MD;  Location:  GI     EXCISE LESION INTRAORAL Bilateral 10/3/2018    Procedure: EXCISE LESION INTRAORAL;  Wide Local Excision Of of Left Oral Cavity Ulcer;  Surgeon: Morro Mijares MD;  [facility]      DRAIN SKIN ABSCESS SIMPLE/SINGLE  3/16/2012    Procedure:INCISION AND DRAINAGE, ABSCESS, SIMPLE;  Surgeon:CHRISTIANO HANCOCK; Location: GI     HEAD & NECK SURGERY       HYSTERECTOMY       HYSTERECTOMY       INCISION AND DRAINAGE ABDOMEN WASHOUT, COMBINED       INJECT EPIDURAL LUMBAR Right 9/15/2020    Procedure: Lumbar5- sacral 1 epidural steroid injection with fluoroscopy;  Surgeon: Tram Florian MD;  [facility]     INJECT EPIDURAL LUMBAR Right 6/29/2021    Procedure: Lumbar 5 sacral 1 epidural steroid injection with fluoroscopy;  Surgeon: Tram Florian MD;  [facility]     INJECT SACROILIAC JOINT Bilateral 6/16/2020    Procedure: Bilateral sacroiliac joint steroid injection with fluoroscopy;  Surgeon: Tram Florian MD;  [facility]     LAMINECTOMY THORACIC ONE LEVEL N/A 8/19/2019    Procedure: LAMINECTOMY, SPINE, THORACIC, 11-12 and Part of Lumbar 1, DRAINAGE OF EPIDURAL ABCESS, Epidural Drain Placement X 2;  Surgeon: Yadiel Beal MD;  Location: [facility] OR     AK PERCUT IMPLNT NEUROELECT,EPIDURAL N/A 8/8/2019    Procedure: TRIAL, SPINAL CORD STIMULATOR WITH [employer] SCIENTIFIC;  Surgeon: Sipple, Daniel Peter, DO;  Location: [facility];  Service: Pain     RADIO FREQUENCY ABLATION / DESTRUCTION OF SACROILOAC JOINT DORSAL PRIMARY RAMUS Bilateral 12/17/2019    Procedure: Bilateral lumbar radiofrequency ablation with fluoroscopy and intravenous sedation ( Lumbar 2,3,4,5 medial branch nerves for the bilateral lumbar3-4, 4-5 and 5-sacral1 joints.;  Surgeon: Tram Florian MD;  [facility]     RADIO FREQUENCY ABLATION / DESTRUCTION OF SACROILOAC JOINT DORSAL PRIMARY RAMUS Right 11/17/2020    Procedure: Right lumbar medial branch nerve radiofrequency ablation right L2,3,4,5 nerves supplying the right L3-4, L4-5 and L5-S1 facet joints;  Surgeon: Tram Florian MD;  Location: [facility]     spinal cord stimulator  08/08/2019     spinal cord stimulator removal  08/13/2019     meclizine (ANTIVERT) 12.5 MG tablet  albuterol (PROAIR HFA, PROVENTIL HFA, VENTOLIN HFA) 108 (90 BASE) MCG/ACT  inhaler  ARIPiprazole (ABILIFY) 15 MG tablet  busPIRone HCl (BUSPAR) 30 MG tablet  celecoxib (CELEBREX) 200 MG capsule  cetirizine (ZYRTEC) 10 MG tablet  cholecalciferol ( ULTRA STRENGTH) 2000 units CAPS  cyclobenzaprine (FLEXERIL) 10 MG tablet  DULoxetine (CYMBALTA) 60 MG capsule  EPINEPHrine (EPIPEN/ADRENACLICK/OR ANY BX GENERIC EQUIV) 0.3 MG/0.3ML injection 2-pack  ferrous sulfate (FEROSUL) 325 (65 Fe) MG tablet  fluconazole (DIFLUCAN) 100 MG tablet  fluconazole (DIFLUCAN) 200 MG tablet  folic acid (FOLVITE) 1 MG tablet  lisinopril-hydrochlorothiazide (ZESTORETIC) 20-25 MG tablet  methotrexate sodium 2.5 MG TABS  montelukast (SINGULAIR) 10 MG tablet  nystatin (MYCOSTATIN) 839041 UNIT/GM external cream  omeprazole (PRILOSEC) 40 MG DR capsule  OXYGEN-HELIUM IN  pregabalin (LYRICA) 150 MG capsule  Respiratory Therapy Supplies ([facility] CPAP FILTER) MISC  rOPINIRole (REQUIP) 0.5 MG tablet  vitamin C 500 MG TABS  zinc sulfate (ZINCATE) 220 (50 Zn) MG capsule      Allergies   Allergen Reactions     Bee Venom Anaphylaxis     Bees      Doxycycline Anaphylaxis     Patient thinks it may have been just nausea and vomiting, however unable to confirm     Erythromycin Anaphylaxis and Shortness Of Breath     Other reaction(s): Vomiting     Hydrocodone-Acetaminophen Itching     Family History  Family History   Problem Relation Age of Onset     Other Cancer Father         base of tongue cancer at age ~65     Hypertension Father      Back Pain Father      Restless Leg Syndrome Father      Asthma Mother      Restless Leg Syndrome Mother      Restless Leg Syndrome Sister      Breast Cancer Maternal Aunt 55     Social History   Social History     Tobacco Use     Smoking status: Current Every Day Smoker     Packs/day: 1.00     Years: 30.00     Pack years: 30.00     Types: Cigarettes     Start date: 1/1/1996     Smokeless tobacco: Never Used     Tobacco comment: has tried the patch   Substance Use Topics     Alcohol use:  "No     Drug use: Not Currently     Types: Marijuana     Comment: very rarely       Past medical history, past surgical history, medications, allergies, family history, and social history were reviewed with the patient. No additional pertinent items.       Review of Systems  A complete review of systems was performed with pertinent positives and negatives noted in the HPI, and all other systems negative.    Physical Exam   BP: 115/58  Pulse: 84  Temp: 98.3  F (36.8  C)  Resp: 16  Height: 162.6 cm (5' 4\")  Weight: 99.8 kg (220 lb)  SpO2: 96 %  Physical Exam  Vitals and nursing note reviewed.   Constitutional:       General: She is not in acute distress.     Appearance: She is well-developed. She is not diaphoretic.   HENT:      Head: Normocephalic and atraumatic.      Mouth/Throat:      Pharynx: No oropharyngeal exudate.   Eyes:      General: No scleral icterus.        Right eye: No discharge.         Left eye: No discharge.      Pupils: Pupils are equal, round, and reactive to light.   Cardiovascular:      Rate and Rhythm: Normal rate and regular rhythm.      Heart sounds: Normal heart sounds. No murmur heard.   No friction rub. No gallop.    Pulmonary:      Effort: Pulmonary effort is normal. No respiratory distress.      Breath sounds: Normal breath sounds. No wheezing.   Chest:      Chest wall: No tenderness.   Abdominal:      General: Bowel sounds are normal. There is no distension.      Palpations: Abdomen is soft.      Tenderness: There is no abdominal tenderness.   Musculoskeletal:         General: No tenderness or deformity. Normal range of motion.      Cervical back: Normal range of motion and neck supple.   Skin:     General: Skin is warm and dry.      Coloration: Skin is not pale.      Findings: No erythema or rash.   Neurological:      General: No focal deficit present.      Mental Status: She is alert and oriented to person, place, and time.      Cranial Nerves: No cranial nerve deficit.      Sensory: No " sensory deficit.      Motor: No weakness.      Coordination: Coordination normal.         ED Course      Procedures            EKG Interpretation:      Interpreted by Obinna Palma DO  Time reviewed: 1827  Symptoms at time of EKG: none   Rhythm: normal sinus   Rate: 64  Axis: normal  Ectopy: none  Conduction: normal  ST Segments/ T Waves: No ST-T wave changes  Q Waves: none  Comparison to prior: Unchanged from 6/8/2016    Clinical Impression: normal EKG             Results for orders placed or performed during the hospital encounter of 08/31/21   CT Head w/o Contrast     Status: None    Narrative    CT HEAD W/O CONTRAST 8/31/2021 7:39 PM    History: Dizziness, non-specific; Double vision, vertigo, off balance.    ICD-10:    Comparison: None    Technique: Using multidetector thin collimation helical acquisition  technique, axial, coronal and sagittal CT images from the skull base  to the vertex were obtained without intravenous contrast.   (topogram) image(s) also obtained and reviewed.    Findings: There is no intracranial hemorrhage, mass effect, or midline  shift. Gray/white matter differentiation in both cerebral hemispheres  is preserved. Ventricles are proportionate to the cerebral sulci. The  basal cisterns are clear.    The bony calvaria and the bones of the skull base are normal. The  visualized portions of the paranasal sinuses and mastoid air cells are  clear.       Impression    Impression:  No acute intracranial pathology.          I have personally reviewed the examination and initial interpretation  and I agree with the findings.    STEPHAN MARTINEZ MD         SYSTEM ID:  [id number]   CTA Head Neck with Contrast     Status: None    Narrative    CTA  HEAD NECK WITH CONTRAST 8/31/2021 7:39 PM    CT angiogram of the neck   CT angiogram of the base of the brain with contrast  Reconstruction by the Radiologist on the 3D workstation    Provided History:  Dizziness, non-specific; Double vision,  vertigo,  off balance.  ICD-10:    Comparison:  None.      Technique:  HEAD and NECK CTA: During rapid bolus intravenous injection of  nonionic contrast material, axial images were obtained using thin  collimation multidetector helical technique from the base of the upper  aortic arch through the [place]. This CT angiogram data was  reconstructed at thin intervals with mild overlap. Images were sent to  the [employer] workstation, and 3D reconstructions were obtained. The  axial source images, multiplanar reformations, 3D reconstructions in  both maximum intensity projection display and volume rendered models  were reviewed, with reconstructions performed by the technologist and  the radiologist.    Contrast: iopamidol (ISOVUE-370) solution 75 mL    Findings:    Head CTA demonstrates no aneurysm or stenosis of the major  intracranial arteries. The tip of the basilar artery is diminutive,  continuing as a tiny right PCA P1 segment. Posterior circulation  predominantly supplied by dominant posterior communicating arteries.    Neck CTA demonstrates no stenosis of the major cervical arteries. The  origins of the great vessels from the aortic arch are patent. The  normal distal right internal carotid artery measures 5 mm. The normal  distal left internal carotid artery measures 5 mm.     No mass within the visualized portions of the cervical soft tissues or  lung apices.       Impression    Impression:    1. Head CTA demonstrates no aneurysm or stenosis of the major  intracranial arteries. The tip of the basilar artery is diminutive,  likely variational as posterior circulation is predominantly supplied  via communicating arteries.  2. Neck CTA demonstrates no stenosis of the major cervical arteries.    I have personally reviewed the examination and initial interpretation  and I agree with the findings.    STEPHAN MARTINEZ MD         SYSTEM ID:  [id number]   Extra Blue Top Tube     Status: None   Result Value Ref Range     Hold Specimen JIC    Extra Red Top Tube     Status: None   Result Value Ref Range    Hold Specimen JIC    Extra Green Top (Lithium Heparin) Tube     Status: None   Result Value Ref Range    Hold Specimen JIC    Extra Purple Top Tube     Status: None   Result Value Ref Range    Hold Specimen JIC    CBC with platelets differential     Status: None    Narrative    The following orders were created for panel order CBC with platelets differential.  Procedure                               Abnormality         Status                     ---------                               -----------         ------                     CBC with platelets and d...[[id number]]                      Final result                 Please view results for these tests on the individual orders.   Comprehensive metabolic panel     Status: Abnormal   Result Value Ref Range    Sodium 134 133 - 144 mmol/L    Potassium 3.2 (L) 3.4 - 5.3 mmol/L    Chloride 100 94 - 109 mmol/L    Carbon Dioxide (CO2) 31 20 - 32 mmol/L    Anion Gap 3 3 - 14 mmol/L    Urea Nitrogen 23 7 - 30 mg/dL    Creatinine 1.36 (H) 0.52 - 1.04 mg/dL    Calcium 8.7 8.5 - 10.1 mg/dL    Glucose 86 70 - 99 mg/dL    Alkaline Phosphatase 76 40 - 150 U/L    AST 18 0 - 45 U/L    ALT 19 0 - 50 U/L    Protein Total 7.4 6.8 - 8.8 g/dL    Albumin 3.4 3.4 - 5.0 g/dL    Bilirubin Total 0.4 0.2 - 1.3 mg/dL    GFR Estimate 45 (L) >60 mL/min/1.73m2   CBC with platelets and differential     Status: None   Result Value Ref Range    WBC Count 10.7 4.0 - 11.0 10e3/uL    RBC Count 4.07 3.80 - 5.20 10e6/uL    Hemoglobin 13.2 11.7 - 15.7 g/dL    Hematocrit 39.9 35.0 - 47.0 %    MCV 98 78 - 100 fL    MCH 32.4 26.5 - 33.0 pg    MCHC 33.1 31.5 - 36.5 g/dL    RDW 14.1 10.0 - 15.0 %    Platelet Count 425 150 - 450 10e3/uL    % Neutrophils 62 %    % Lymphocytes 27 %    % Monocytes 7 %    % Eosinophils 3 %    % Basophils 1 %    % Immature Granulocytes 0 %    NRBCs per 100 WBC 0 <1 /100    Absolute Neutrophils 6.6  1.6 - 8.3 10e3/uL    Absolute Lymphocytes 2.9 0.8 - 5.3 10e3/uL    Absolute Monocytes 0.7 0.0 - 1.3 10e3/uL    Absolute Eosinophils 0.4 0.0 - 0.7 10e3/uL    Absolute Basophils 0.1 0.0 - 0.2 10e3/uL    Absolute Immature Granulocytes 0.0 <=0.0 10e3/uL    Absolute NRBCs 0.0 10e3/uL   EKG 12-lead, tracing only     Status: None   Result Value Ref Range    Systolic Blood Pressure  mmHg    Diastolic Blood Pressure  mmHg    Ventricular Rate 71 BPM    Atrial Rate 71 BPM    CA Interval 162 ms    QRS Duration 82 ms     ms    QTc 434 ms    P Axis 65 degrees    R AXIS 64 degrees    T Axis 67 degrees    Interpretation ECG       Sinus rhythm  Normal ECG  Unconfirmed report - interpretation of this ECG is computer generated - see medical record for final interpretation  Confirmed by - EMERGENCY ROOM, PHYSICIAN ([id number]),  HILDA CROWELL ([id number]) on 8/31/2021 9:58:14 PM     EKG 12-lead, tracing only     Status: None   Result Value Ref Range    Systolic Blood Pressure  mmHg    Diastolic Blood Pressure  mmHg    Ventricular Rate 64 BPM    Atrial Rate 64 BPM    CA Interval 166 ms    QRS Duration 86 ms     ms    QTc 429 ms    P Axis 68 degrees    R AXIS 55 degrees    T Axis 46 degrees    Interpretation ECG       Sinus rhythm  Normal ECG  Unconfirmed report - interpretation of this ECG is computer generated - see medical record for final interpretation  Confirmed by - EMERGENCY ROOM, PHYSICIAN ([id number]),  HILDA CROWELL ([id number]) on 8/31/2021 9:58:54 PM     [place] Draw     Status: None    Narrative    The following orders were created for panel order [place] Draw.  Procedure                               Abnormality         Status                     ---------                               -----------         ------                     Extra Blue Top Tube[[id number]]                              Final result               Extra Red Top Tube[[id number]]                               Final result               Extra Green  Top (Lithium...[[id number]]                      Final result               Extra Purple Top Tube[[id number]]                            Final result                 Please view results for these tests on the individual orders.     Medications   sodium chloride (PF) 0.9% PF flush 90 mL (90 mLs Intravenous Given 8/31/21 1923)   iopamidol (ISOVUE-370) solution 75 mL (75 mLs Intravenous Given 8/31/21 1923)   meclizine (ANTIVERT) tablet 25 mg (25 mg Oral Given 8/31/21 2109)   potassium chloride (KAYCIEL) solution 20 mEq (20 mEq Oral Given 9/1/21 0023)        Assessments & Plan (with Medical Decision Making)   This is a 51-year-old female who presents with double and blurred vision.  This is been intermittent but more frequent over the past several days.  She does not know the exact onset has it has been gradual.  No known precipitating factors.  She has not had similar occurrence in the past.  Exam demonstrates no acute abnormalities.  There is no focal neuro deficits or loss of coordination.  ECG shows no acute abnormalities.  Lab work shows a potassium of 3.2.  CT and CTA show no acute abnormalities.  I do not believe symptoms are caused by etiology such as CVA in light of these findings.  I discussed the case with Ophthalmology who saw the patient.  They patient has having intermittent alternating exotropia.  They note no 6th or 3rd nerve palsy.  No other changes with extraocular movements.  They note that this is likely due to patient's recent illness.  Patient was given with meclizine as there were potential vertiginous symptoms.  We will trial patient on a course of this as well.  Ophthalmology will set up follow-up and we will also provide referral for Neurology if she continues to have symptoms. Will discharge home with return precautions. Discussed reasons to return to the emergency department.  Patient understands and agrees with this plan.    I have reviewed the nursing notes. I have reviewed the findings,  diagnosis, plan and need for follow up with the patient.    Discharge Medication List as of 9/1/2021 12:16 AM      START taking these medications    Details   meclizine (ANTIVERT) 12.5 MG tablet Take 1 tablet (12.5 mg) by mouth 4 times daily as needed for dizziness, Disp-30 tablet, R-0, Local Print             Final diagnoses:   Double vision       --  Obinna Palma DO  [facility] EMERGENCY DEPARTMENT  8/31/2021     Obinna Palma DO  09/01/21 0302

## 2021-09-01 NOTE — TELEPHONE ENCOUNTER
Pt to f/u in 7-10 day following ED visit yesterday per on call.-- intermittent diplopia    Facilitator reach out and scheduled appt    Kenneth To RN 3:58 PM 09/01/21

## 2021-09-01 NOTE — TELEPHONE ENCOUNTER
FUTURE VISIT INFORMATION      FUTURE VISIT INFORMATION:    Date: 10/1/21    Time: 11:00pm    Location: [facility] [place]  REFERRAL INFORMATION:    Referring provider:  Morro Mijares MD    Referring providers clinic:  [facility]    Reason for visit/diagnosis  Vocal cord polyps    RECORDS REQUESTED FROM:       Clinic name Comments Records Status Imaging Status   [facility] Ov/referral 8/31  Ov/notes 5/14/18-8/31/21 EPIC

## 2021-09-02 ENCOUNTER — MYC MEDICAL ADVICE (OUTPATIENT)
Dept: OTOLARYNGOLOGY | Facility: CLINIC | Age: 51
End: 2021-09-02

## 2021-09-03 ENCOUNTER — PRE VISIT (OUTPATIENT)
Dept: NEUROSURGERY | Facility: CLINIC | Age: 51
End: 2021-09-03

## 2021-09-03 ENCOUNTER — OFFICE VISIT (OUTPATIENT)
Dept: NEUROSURGERY | Facility: CLINIC | Age: 51
End: 2021-09-03
Attending: NURSE PRACTITIONER
Payer: COMMERCIAL

## 2021-09-03 VITALS
HEART RATE: 77 BPM | OXYGEN SATURATION: 94 % | BODY MASS INDEX: 37.05 KG/M2 | WEIGHT: 217 LBS | DIASTOLIC BLOOD PRESSURE: 86 MMHG | SYSTOLIC BLOOD PRESSURE: 142 MMHG | HEIGHT: 64 IN

## 2021-09-03 DIAGNOSIS — Z98.1 H/O CERVICAL SPINAL ARTHRODESIS: Primary | ICD-10-CM

## 2021-09-03 PROCEDURE — 99214 OFFICE O/P EST MOD 30 MIN: CPT | Performed by: NEUROLOGICAL SURGERY

## 2021-09-03 ASSESSMENT — MIFFLIN-ST. JEOR: SCORE: 1589.31

## 2021-09-03 NOTE — LETTER
9/3/2021       RE: Zayra Ramirez  [address] [place] [address]     Dear Colleague,    Thank you for referring your patient, Zayra Ramirez, to the [facility] [place] at [facility]. Please see a copy of my visit note below.    Service Date: 09/03/2021    CLINIC NOTE    HISTORY OF PRESENT ILLNESS:  Ms. Ramirez is a 51-year-old female who presents to our clinic for evaluation of neck pain that she has had since last winter.  She previously had a C3-C5 anterior cervical fusion by Dr. Monique in 2017.  She did well for a couple years, and then she began to have pain in the neck, and that has been progressively worsening since.  She also states that she hears a clicking sound.  She denies any radicular pain radiating down to her arms.  According to the patient, she was smoking at the time of surgery, and she still smokes to this day.    She has been followed in our Pain Clinic for her low back pain.    She was apparently found to have broken hardware in the cervical spine, and she presents today for further evaluation.    PHYSICAL EXAMINATION:  The patient is in no acute distress.  She has good strength.  Deep tendon reflexes are 2+ in the patellas and 2+ in the brachioradialis.  She has no Yolanda sign.  She is able to do a heel-to-toe tandem walk.  She has slight difficulty, but overall she is stable.    I have personally reviewed the cervical spine CT, which shows a previous anterior cervical plate between C3-C5, but there is a broken screw on the right side of C3.  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    IMPRESSION AND PLAN:  Ms. Ramirez presents with neck pain that she has had since last winter.  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  Furthermore, there is a diffuse facet arthropathy at C2-3 all the way down to C7-T1.  At this point, it is difficult to distinguish whether her  pain is coming from the pseudoarthrodesis or facet arthropathy.  I told her that there is a bone growth overlying the cervical plate, so it may be a challenge to remove the plate to do the revision surgery because it will require drilling off the bone overgrowth of the plate.  Furthermore, the right-sided broken screw cannot be retrieved because of the screw fracture.  I think at this point, her best option would be to see her pain doctor and undergo either facet injections or medial branch nerve block, possibly followed by radiofrequency ablation.  I explained to the patient that a posterior C3-C5 posterior cervical fusion is an option, but again because of her diffuse facet arthropathy, it may not completely resolve her neck pain.    Henry Sands MD

## 2021-09-03 NOTE — PROGRESS NOTES
Service Date: 09/03/2021    CLINIC NOTE    HISTORY OF PRESENT ILLNESS:  Ms. Ramirez is a 51-year-old female who presents to our clinic for evaluation of neck pain that she has had since last winter.  She previously had a C3-C5 anterior cervical fusion by Dr. Monique in 2017.  She did well for a couple years, and then she began to have pain in the neck, and that has been progressively worsening since.  She also states that she hears a clicking sound.  She denies any radicular pain radiating down to her arms.  According to the patient, she was smoking at the time of surgery, and she still smokes to this day.    She has been followed in our Pain Clinic for her low back pain.    She was apparently found to have broken hardware in the cervical spine, and she presents today for further evaluation.    PHYSICAL EXAMINATION:  The patient is in no acute distress.  She has good strength.  Deep tendon reflexes are 2+ in the patellas and 2+ in the brachioradialis.  She has no Yolanda sign.  She is able to do a heel-to-toe tandem walk.  She has slight difficulty, but overall she is stable.    I have personally reviewed the cervical spine CT, which shows a previous anterior cervical plate between C3-C5, but there is a broken screw on the right side of C3.  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    IMPRESSION AND PLAN:  Ms. Ramirez presents with neck pain that she has had since last winter.  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  Furthermore, there is a diffuse facet arthropathy at C2-3 all the way down to C7-T1.  At this point, it is difficult to distinguish whether her pain is coming from the pseudoarthrodesis or facet arthropathy.  I told her that there is a bone growth overlying the cervical plate, so it may be a challenge to remove the plate to do the revision surgery because it will require drilling off the bone overgrowth of the plate.  Furthermore, the right-sided broken screw  cannot be retrieved because of the screw fracture.  I think at this point, her best option would be to see her pain doctor and undergo either facet injections or medial branch nerve block, possibly followed by radiofrequency ablation.  I explained to the patient that a posterior C3-C5 posterior cervical fusion is an option, but again because of her diffuse facet arthropathy, it may not completely resolve her neck pain.    Henry Sands MD        D: 2021   T: 2021   MT: [email or web address]    Name:     JAYA MATHEWAmelia  MRN:      [id number]        Account:      [id number]   :      1970           Service Date: 2021       Document: [id number]

## 2021-09-09 NOTE — TELEPHONE ENCOUNTER
Writer called pt and scheduled an appointment with Dr. Mijares for 9/14/21 at 11:20am. Pt expressed understanding.    Mabel Obrien, EMT

## 2021-09-13 ENCOUNTER — MYC MEDICAL ADVICE (OUTPATIENT)
Dept: INTERNAL MEDICINE | Facility: CLINIC | Age: 51
End: 2021-09-13

## 2021-09-14 ENCOUNTER — OFFICE VISIT (OUTPATIENT)
Dept: OTOLARYNGOLOGY | Facility: CLINIC | Age: 51
End: 2021-09-14
Payer: COMMERCIAL

## 2021-09-14 VITALS
HEART RATE: 76 BPM | HEIGHT: 64 IN | BODY MASS INDEX: 37.9 KG/M2 | TEMPERATURE: 98.6 F | OXYGEN SATURATION: 94 % | WEIGHT: 222 LBS

## 2021-09-14 DIAGNOSIS — K12.1 STOMATITIS AND MUCOSITIS: Primary | ICD-10-CM

## 2021-09-14 DIAGNOSIS — K12.30 STOMATITIS AND MUCOSITIS: Primary | ICD-10-CM

## 2021-09-14 PROCEDURE — 99214 OFFICE O/P EST MOD 30 MIN: CPT | Performed by: OTOLARYNGOLOGY

## 2021-09-14 RX ORDER — CLOTRIMAZOLE 10 MG/1
LOZENGE ORAL
Qty: 42 LOZENGE | Refills: 0 | Status: SHIPPED | OUTPATIENT
Start: 2021-09-14 | End: 2021-09-29

## 2021-09-14 RX ORDER — DEXAMETHASONE 0.5 MG/5ML
1 SOLUTION ORAL 2 TIMES DAILY
Qty: 200 ML | Refills: 0 | Status: SHIPPED | OUTPATIENT
Start: 2021-09-14 | End: 2021-09-29

## 2021-09-14 ASSESSMENT — PAIN SCALES - GENERAL: PAINLEVEL: MILD PAIN (2)

## 2021-09-14 ASSESSMENT — MIFFLIN-ST. JEOR: SCORE: 1611.99

## 2021-09-14 NOTE — LETTER
2021       RE: Zayra Ramirez  [address] [place] [address]     Dear Colleague,    Thank you for referring your patient, Zayra Ramirez, to the [facility] [place] at [facility]. Please see a copy of my visit note below.      Otolaryngology Clinic    Name: Zayra Ramirez  MRN: [id number]  Age: 51 year old  : 2021      Chief Complaint:   Follow up     History of Present Illness:   Zayra Ramirez is a 51 year old female with a history of an aphthous ulcer of the mouth who presents for follow up. The patient had a left-sided chronic ulcer in her mouth that did not heal for a long time, but reported healing at her visit on 01/15/2019. She was last seen in clinic on 2021 and endorsed symptoms since March with persistent throat pain. Her exam shows vocal cord polyps, improved oral thrush, and no evidence of any precancer. I recommended follow-up with SLP.    Today, the patient states that her mouth is doing better but endorses pain and an ulcer on the back of her tongue. She notes that eating salt and brushing her teeth make her pain worse. She reports she has not yet been to the voice clinic as they are booked until October.     Review of Systems:   Pertinent items are noted in HPI or as in patient entered ROS below, remainder of complete ROS is negative.    ENT ROS 9/10/2021   Constitutional Weight loss, Unexplained fatigue, Unexplained fever or night sweats   Neurology Numbness   Psychology -   Eyes Visual loss, Double vision   Ears, Nose, Throat Ear pain, Ringing/noise in ears, Trouble swallowing   Cardiopulmonary Cough, Wheezing   Gastrointestinal/Genitourinary Heartburn/indigestion, Diarrhea   Musculoskeletal Sore or stiff joints, Back pain, Neck pain   Allergy/Immunology Allergies or hay fever   Hematologic -   Endocrine -   Skin -   Other -        Physical Exam:   Pulse 76   Temp  "98.6  F (37  C) (Temporal)   Ht 1.626 m (5' 4\")   Wt 100.7 kg (222 lb)   LMP  (LMP Unknown)   SpO2 94%   BMI 38.11 kg/m       PHYSICAL EXAMINATION:    Constitutional:  The patient was unaccompanied, well-groomed, and in no acute distress.    Skin:  Warm and pink.    Neurologic:  Alert and oriented x 3.  CN's III-XII within normal limits.  Voice normal.   Psychiatric:  The patient's affect was calm, cooperative, and appropriate.    Respiratory:  Breathing comfortably without stridor or exertion of accessory muscles.    Eyes: Extraocular movement intact.    Head:  Normocephalic and atraumatic.  No lesions or scars.    OC/OP:  Normal floor of mouth, buccal mucosa, and palate. No abnormal lymph tissue in the oropharynx.  The pterygoid region is non-tender. 8 mm ulcer to ventrolateral tongue. Somewhat geographic appearance to tongue with dryness present. No other obvious lesions.   Neck:  Supple with normal laryngeal and tracheal landmarks.  The parotid beds were without masses.  No palpable thyroid.  Lymphatic:  There is no palpable lymphadenopathy in the neck.     Assessment and Plan:    ICD-10-CM    1. Stomatitis and mucositis  K12.1 dexamethasone AF (DECADRON) 0.1 MG/ML solution    K12.30 clotrimazole (MYCELEX) 10 MG lozenge     I reassured her that her mouth looks better than it did during her last visit. I am going to start her on Dexamethasone mouthwash and clotrimazole lozenges. She should follow-up in 3 weeks for recheck.     Follow-up: Return in about 3 weeks (around 10/5/2021) for using a video visit, using a phone visit.         Scribe Disclosure:  I, Chen Hui, am serving as a scribe to document services personally performed by Morro Mijares MD at this visit, based upon the provider's statements to me. All documentation has been reviewed by the aforementioned provider prior to being entered into the official medical record.       Again, thank you for allowing me to participate in the care of " your patient.      Sincerely,    Morro Mijares MD

## 2021-09-14 NOTE — PROGRESS NOTES
"  Otolaryngology Clinic    Name: Zayra Ramirez  MRN: [id number]  Age: 51 year old  : 2021      Chief Complaint:   Follow up     History of Present Illness:   Zayra Ramirez is a 51 year old female with a history of an aphthous ulcer of the mouth who presents for follow up. The patient had a left-sided chronic ulcer in her mouth that did not heal for a long time, but reported healing at her visit on 01/15/2019. She was last seen in clinic on 2021 and endorsed symptoms since March with persistent throat pain. Her exam shows vocal cord polyps, improved oral thrush, and no evidence of any precancer. I recommended follow-up with SLP.    Today, the patient states that her mouth is doing better but endorses pain and an ulcer on the back of her tongue. She notes that eating salt and brushing her teeth make her pain worse. She reports she has not yet been to the voice clinic as they are booked until October.     Review of Systems:   Pertinent items are noted in HPI or as in patient entered ROS below, remainder of complete ROS is negative.    ENT ROS 9/10/2021   Constitutional Weight loss, Unexplained fatigue, Unexplained fever or night sweats   Neurology Numbness   Psychology -   Eyes Visual loss, Double vision   Ears, Nose, Throat Ear pain, Ringing/noise in ears, Trouble swallowing   Cardiopulmonary Cough, Wheezing   Gastrointestinal/Genitourinary Heartburn/indigestion, Diarrhea   Musculoskeletal Sore or stiff joints, Back pain, Neck pain   Allergy/Immunology Allergies or hay fever   Hematologic -   Endocrine -   Skin -   Other -        Physical Exam:   Pulse 76   Temp 98.6  F (37  C) (Temporal)   Ht 1.626 m (5' 4\")   Wt 100.7 kg (222 lb)   LMP  (LMP Unknown)   SpO2 94%   BMI 38.11 kg/m       PHYSICAL EXAMINATION:    Constitutional:  The patient was unaccompanied, well-groomed, and in no acute distress.    Skin:  Warm and pink.    Neurologic:  Alert and oriented x 3.  CN's III-XII within " normal limits.  Voice normal.   Psychiatric:  The patient's affect was calm, cooperative, and appropriate.    Respiratory:  Breathing comfortably without stridor or exertion of accessory muscles.    Eyes: Extraocular movement intact.    Head:  Normocephalic and atraumatic.  No lesions or scars.    OC/OP:  Normal floor of mouth, buccal mucosa, and palate. No abnormal lymph tissue in the oropharynx.  The pterygoid region is non-tender. 8 mm ulcer to ventrolateral tongue. Somewhat geographic appearance to tongue with dryness present. No other obvious lesions.   Neck:  Supple with normal laryngeal and tracheal landmarks.  The parotid beds were without masses.  No palpable thyroid.  Lymphatic:  There is no palpable lymphadenopathy in the neck.     Assessment and Plan:    ICD-10-CM    1. Stomatitis and mucositis  K12.1 dexamethasone AF (DECADRON) 0.1 MG/ML solution    K12.30 clotrimazole (MYCELEX) 10 MG lozenge     I reassured her that her mouth looks better than it did during her last visit. I am going to start her on Dexamethasone mouthwash and clotrimazole lozenges. She should follow-up in 3 weeks for recheck.     Follow-up: Return in about 3 weeks (around 10/5/2021) for using a video visit, using a phone visit.         Scribe Disclosure:  I, Chen Hui, am serving as a scribe to document services personally performed by Morro Mijares MD at this visit, based upon the provider's statements to me. All documentation has been reviewed by the aforementioned provider prior to being entered into the official medical record.

## 2021-09-15 DIAGNOSIS — G25.81 RESTLESS LEG SYNDROME: ICD-10-CM

## 2021-09-15 RX ORDER — ROPINIROLE 0.5 MG/1
0.5 TABLET, FILM COATED ORAL AT BEDTIME
Qty: 90 TABLET | Refills: 1 | Status: CANCELLED | OUTPATIENT
Start: 2021-09-15

## 2021-09-16 NOTE — TELEPHONE ENCOUNTER
She was prescribed a 90 day with 1 refill on 4/19. This should last her roughly until mid October.   I will wait to refill it.  Bennett Goltz, PA-C

## 2021-09-17 DIAGNOSIS — M54.12 CERVICAL RADICULOPATHY: Primary | ICD-10-CM

## 2021-09-19 NOTE — PROGRESS NOTES
"  OPHTHALMOLOGY - General Clinic Progress Note    CC:  Binocular horizontal diplopia    HPI:  HPI       Diplopia Evaluation       In both eyes.  Disease is recurrent.  Characterized as oblique.  Duration of weeks.  Occurring intermittently.  Occurring when tired.  Since onset it is gradually improving.  Associated symptoms include blurred vision and fatigue.  Negative for headaches and eye pain.  Treatments tried include glasses.  Response to treatment was no improvement.  Pain was noted as 0/10.                Comments       Patient is here for evaluation of intermittent diplopia.  Patient states vision is blurry each eye for months, but in the last 3-4 weeks, noticing more double vision. \"One image is upright, and the other image is at an angle. It seems to happen more when I'm tired, but one day it was all day. I don't know if it would clear it covering one eye. I think it was more out of the left eye. I saw a doctor at the ER a few weeks ago, and was told my left eye moves outward. I was also told by my previous eye doctor that I should consider prisms in my glasses. I was getting dizzy spells, but I'm not anymore. I tried wearing the glasses, but they don't help. I only wear them when it's more hazy outside.\" Patient notes swelling, gritty feeling, and itchy each eye, especially with allergies. \"I use drops, and they do help a little.\"     Opal Mccarty, COT 9:45 AM 09/20/2021              Last edited by Opal Mccarty on 9/20/2021  9:45 AM. (History)        51yoF had acute/subacute onset binocular diplopia around mid 08/2021 and went to ED 08/31/2021 and diagnosed with intermittent alternating exotropia by Dr. Rik Drummond likely due to decompensation in setting of acute illness leading to fatigue. CTH and CTA negative. ROS negative for GCA.    Interval: Significantly less diplopia, now it's only when she is tired versus it was constant the whole day 08/31/2021. She says the images appear like " shadows/ghosting rather than discrete objects.     POHx  Last eye exam: 03/2021 [facility] in [place], [place] [email or web address]  Prior eye surgery/laser: None  CTL wearer: No  Glasses: Bifocals but mainly uses Readers  GTTs: None    Past Medical History:  - HTN  - Asthma  - Rheumatoid arthritis - on methotrexate   - Aphthous ulcers; ENT following  - Multiple back and neck surgeries for back/neck pain; cervical pseudoarthropathy and facet arthropathy following with Neuerosurgery   - No history of strokes.   - No T2DM.   - History of epidural abscess from an infection from her spinal stimulator in 2019. She had an emergency surgery for this abscess.      Family History:  - No glaucoma or macular degeneration.      Social History:  - Lives in [place].     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, appears mainly commitant   - Improving course suggest this was breakdown of a phoria in setting of relapsing/remitting illness post COVID vaccine 04/2021   Plan  - Patient opted to monitor and see Neuro-Ophthalmology if her diplopia worsens again but she prefers to hold off  - She would alos like to follow-up with her regular ophthalmologist   Dry eye syndrome, both eyes  - Sx: scratchiness, intermittent blurriness   Plan  - PFATs 4-6x a day PRN both eyes, provided discount coupon today    Disposition:  Return in about 4 weeks (around 10/18/2021), or if symptoms worsen or fail to improve, for Neuro-ophthalmology for repeat measurements for intermittent XT.     Seen and discussed with Dr. Thea Luis.    My privilege to be part of your care,  Leodan Gan MD, MSc  Ophthalmology PGY-2 resident physician  Pager: [phone]    Teaching statement:  Complete documentation of historical and exam elements from today's encounter can be found in the full encounter summary report (not reduplicated in this progress note). I personally  obtained the chief complaint(s) and history of present illness.  I confirmed and edited as necessary the review of systems, past medical/surgical history, family history, social history, and examination findings as documented by others; and I examined the patient myself. I personally reviewed the relevant tests, images, and reports as documented above.     I formulated and edited as necessary the assessment and plan and discussed the findings and management plan with the patient and family.    Elizabeth Luis MD  Comprehensive Ophthalmology & Ocular Pathology  Department of Ophthalmology and Visual Neurosciences  [email or web address]  Pager [phone]

## 2021-09-20 ENCOUNTER — OFFICE VISIT (OUTPATIENT)
Dept: OPHTHALMOLOGY | Facility: CLINIC | Age: 51
End: 2021-09-20
Payer: COMMERCIAL

## 2021-09-20 DIAGNOSIS — H04.123 DRY EYE SYNDROME OF BOTH EYES: ICD-10-CM

## 2021-09-20 DIAGNOSIS — H50.34 INTERMITTENT EXOTROPIA, ALTERNATING: Primary | ICD-10-CM

## 2021-09-20 PROCEDURE — 99203 OFFICE O/P NEW LOW 30 MIN: CPT | Mod: GC | Performed by: STUDENT IN AN ORGANIZED HEALTH CARE EDUCATION/TRAINING PROGRAM

## 2021-09-20 PROCEDURE — G0463 HOSPITAL OUTPT CLINIC VISIT: HCPCS | Mod: 25

## 2021-09-20 RX ORDER — CARBOXYMETHYLCELLULOSE SODIUM 5 MG/ML
1 SOLUTION/ DROPS OPHTHALMIC 3 TIMES DAILY PRN
Qty: 15 ML | Refills: 11 | Status: SHIPPED | OUTPATIENT
Start: 2021-09-20 | End: 2024-02-20

## 2021-09-20 ASSESSMENT — REFRACTION_WEARINGRX
SPECS_TYPE: BIFOCAL
OD_CYLINDER: +1.50
OD_SPHERE: -0.25
OS_ADD: +1.75
OS_SPHERE: -0.75
OD_AXIS: 168
OS_CYLINDER: +1.50
OS_AXIS: 011
OD_ADD: +1.75

## 2021-09-20 ASSESSMENT — EXTERNAL EXAM - RIGHT EYE: OD_EXAM: NORMAL

## 2021-09-20 ASSESSMENT — VISUAL ACUITY
OD_SC: 20/30
OS_PH_CC: 20/20
OD_CC+: +2
OS_SC+: +2
OS_SC: 20/25
OS_CC: 20/40
OD_CC: 20/25
OD_SC+: +1
OS_PH_CC+: -2
METHOD: SNELLEN - LINEAR

## 2021-09-20 ASSESSMENT — TONOMETRY
IOP_METHOD: ICARE
OD_IOP_MMHG: 14
OS_IOP_MMHG: 11

## 2021-09-20 ASSESSMENT — CUP TO DISC RATIO
OS_RATIO: 0.2
OD_RATIO: 0.2

## 2021-09-20 ASSESSMENT — SLIT LAMP EXAM - LIDS
COMMENTS: NORMAL, NO PTOSIS
COMMENTS: NORMAL, NO PTOSIS

## 2021-09-20 ASSESSMENT — CONF VISUAL FIELD
OS_NORMAL: 1
OD_NORMAL: 1

## 2021-09-20 ASSESSMENT — EXTERNAL EXAM - LEFT EYE: OS_EXAM: NORMAL

## 2021-09-20 NOTE — NURSING NOTE
"Chief Complaints and History of Present Illnesses   Patient presents with     Diplopia Evaluation     Chief Complaint(s) and History of Present Illness(es)     Diplopia Evaluation     Laterality: both eyes    Onset: recurrent    Quality: oblique    Duration: weeks    Frequency: intermittently    Timing: when tired    Course: gradually improving    Associated symptoms: blurred vision and fatigue.  Negative for headaches and eye pain    Treatments tried: glasses    Response to treatment: no improvement    Pain scale: 0/10              Comments     Patient is here for evaluation of intermittent diplopia.  Patient states vision is blurry each eye for months, but in the last 3-4 weeks, noticing more double vision. \"One image is upright, and the other image is at an angle. It seems to happen more when I'm tired, but one day it was all day. I don't know if it would clear it covering one eye. I think it was more out of the left eye. I saw a doctor at the ER a few weeks ago, and was told my left eye moves outward. I was also told by my previous eye doctor that I should consider prisms in my glasses. I was getting dizzy spells, but I'm not anymore. I tried wearing the glasses, but they don't help. I only wear them when it's more hazy outside.\" Patient notes swelling, gritty feeling, and itchy each eye, especially with allergies. \"I use drops, and they do help a little.\"     Opal Mccarty, COT 9:45 AM 09/20/2021                  "

## 2021-09-21 ENCOUNTER — VIRTUAL VISIT (OUTPATIENT)
Dept: INTERNAL MEDICINE | Facility: CLINIC | Age: 51
End: 2021-09-21
Payer: COMMERCIAL

## 2021-09-21 DIAGNOSIS — M54.12 CERVICAL RADICULOPATHY: Primary | ICD-10-CM

## 2021-09-21 DIAGNOSIS — R19.7 DIARRHEA, UNSPECIFIED TYPE: ICD-10-CM

## 2021-09-21 PROCEDURE — 99215 OFFICE O/P EST HI 40 MIN: CPT | Mod: 95 | Performed by: NURSE PRACTITIONER

## 2021-09-21 RX ORDER — TRAMADOL HYDROCHLORIDE 50 MG/1
50 TABLET ORAL
Qty: 30 TABLET | Refills: 0 | Status: SHIPPED | OUTPATIENT
Start: 2021-09-21 | End: 2021-10-21

## 2021-09-21 NOTE — PROGRESS NOTES
Zayra Ramirez is a 51 year old female year old who is being evaluated via a billable video visit.      How would you like to obtain your AVS? MyChart  If the video visit is dropped, the invitation should be resent by: Text to cell phone: see chart  Will anyone else be joining your video visit? No      Video Start Time: 3:04      Subjective   Zayra Ramirez who presents for the following health issues: recurring diarrhea.     HPI   Zayra states since the day after her 2nd Covid vaccine she has recurrent bouts of diarrhea last 4-5 days with extreme exhaustion keeping her in bed. She had 2 episodes in June, 2 in July, 2 in August and one a few weeks ago. She has missed alot of work.    She had a colonoscopy 2/6/21.     She still has been treating thrush with Mycelex lozenges.     Her back pain is ongoing and aggravated at night.  She does not sleep well and wakes her boyfriend crying from pain. She has minimal improvement on her celebrex, flexeril, duloxetine, and Lyrica. She states Dr. Florian dc'd her Tramadol which was giving her some relief.  She might have another injection in the near future.    She met with Dr. Sands from Neurosurgery who explained to her she has psuedoarthrosis at C3-4 and C4-5, as well as diffuse facet arthropathy at C2-3 down to C7-T1. Bone growth over the cervical plate complicates any revision surgery.( See note of 9/3/2021).  She is still smoking.     Review of Systems   See above.        Objective     Vitals: No vitals were obtained today due to virtual visit.    Physical Exam   GENERAL: Healthy, alert and no distress  EYES: Eyes grossly normal to inspection.  No discharge or erythema, or obvious scleral/conjunctival abnormalities.  HENT: voice is very raspy.  RESP: No audible wheeze, cough, or visible cyanosis.  No visible retractions or increased work of breathing.    SKIN: Visible skin clear. No significant rash, abnormal pigmentation or lesions.  NEURO:  Mentation and speech  appropriate for age.  PSYCH: Mentation appears normal, affect normal, judgement and insight intact, normal speech.     Assessment & Plan:    Zayra was seen today for diarrhea.    Diagnoses and all orders for this visit:    Cervical radiculopathy  -     traMADol (ULTRAM) 50 MG tablet; Take 1 tablet (50 mg) by mouth nightly as needed for severe pain.  We discussed that this will not be an ongoing prescription and amount will not be increased.    Diarrhea, unspecified type  -     Adult Gastro Ref - Consult Only; Future  -     AMB HYDROGEN BREATH TEST to assess for small bowel bacterial overgrowth.       Video-Visit Details    Type of service:  Video Visit    Video End Time : 3:28    Originating Location (pt. Location): Home    Distant Location (provider location):  [facility]IC[facility] [place]     Platform used for Video Visit: [employer]    Total time spent today with this patient including chart review, exam time with patient and documentation : 40 minutes.  Neena MAHARAJ, CNP

## 2021-09-22 DIAGNOSIS — Z11.59 ENCOUNTER FOR SCREENING FOR OTHER VIRAL DISEASES: ICD-10-CM

## 2021-09-23 ENCOUNTER — TELEPHONE (OUTPATIENT)
Dept: INTERNAL MEDICINE | Facility: CLINIC | Age: 51
End: 2021-09-23

## 2021-09-23 ENCOUNTER — MYC MEDICAL ADVICE (OUTPATIENT)
Dept: INTERNAL MEDICINE | Facility: CLINIC | Age: 51
End: 2021-09-23

## 2021-09-23 NOTE — TELEPHONE ENCOUNTER
[facility] Call Center    Phone Message    May a detailed message be left on voicemail: yes     Reason for Call: Other: The patient would like a call from the care team to understand how she go about getting the AMB Hydrogen Breath Test, she setup a virtual visits with GI for 11/9. However, they told her they do not do the test please review and follow up with the patient for clarification thank you.      Action Taken: Message routed to:  [facility] ([facility] pcc    Travel Screening: Not Applicable

## 2021-09-24 NOTE — TELEPHONE ENCOUNTER
REFERRAL INFORMATION:    Referring Provider:  NICKO Guevara CNP    Referring Clinic:  [facility]     Reason for Visit/Diagnosis: Diarrhea      FUTURE VISIT INFORMATION:    Appointment Date: 11/9/2021    Appointment Time: 2:40 PM      NOTES STATUS DETAILS   OFFICE NOTE from Referring Provider Internal 9/21/2021, 11/19/19 Office visit with NICKO Guevara CNP     OFFICE NOTE from Other Specialist Internal 11/20/19 Office visit with Abhi Villafuerte PA-C ([facility])     HOSPITAL DISCHARGE SUMMARY/  ED VISITS Care Everywhere 6/23/2201 ([facility])    OPERATIVE REPORT N/A    MEDICATION LIST Internal         ENDOSCOPY  Internal EGD: 2/6/2020   COLONOSCOPY Internal/ Received  2/6/2020, 5/13/10, 10/20/05  4/17/15 ([facility])     ERCP N/A    EUS N/A    STOOL TESTING N/A    PERTINENT LABS Internal/ Care Everywhere    PATHOLOGY REPORTS (RELATED) Internal 2/6/2020   IMAGING (CT, MRI, EGD, MRCP, Small Bowel Follow Through/SBT, MR/CT Enterography) N/A

## 2021-09-24 NOTE — TELEPHONE ENCOUNTER
Spoke with pt and informed that endoscopy dept will call for the scheduling. I sent the message to endoscopy scheduling.

## 2021-09-29 ENCOUNTER — TELEPHONE (OUTPATIENT)
Dept: ANESTHESIOLOGY | Facility: CLINIC | Age: 51
End: 2021-09-29

## 2021-09-29 ENCOUNTER — OFFICE VISIT (OUTPATIENT)
Dept: OTOLARYNGOLOGY | Facility: CLINIC | Age: 51
End: 2021-09-29
Payer: COMMERCIAL

## 2021-09-29 DIAGNOSIS — K12.1 STOMATITIS AND MUCOSITIS: Primary | ICD-10-CM

## 2021-09-29 DIAGNOSIS — K12.30 STOMATITIS AND MUCOSITIS: Primary | ICD-10-CM

## 2021-09-29 DIAGNOSIS — B37.89 LARYNGEAL CANDIDIASIS: ICD-10-CM

## 2021-09-29 DIAGNOSIS — J38.1 VOCAL CORD POLYPS: ICD-10-CM

## 2021-09-29 PROCEDURE — 99215 OFFICE O/P EST HI 40 MIN: CPT | Mod: 25 | Performed by: REGISTERED NURSE

## 2021-09-29 PROCEDURE — 31575 DIAGNOSTIC LARYNGOSCOPY: CPT | Performed by: REGISTERED NURSE

## 2021-09-29 RX ORDER — CLOTRIMAZOLE 10 MG/1
LOZENGE ORAL
Qty: 42 LOZENGE | Refills: 0 | Status: SHIPPED | OUTPATIENT
Start: 2021-09-29 | End: 2021-11-23

## 2021-09-29 RX ORDER — DEXAMETHASONE 0.5 MG/5ML
1 SOLUTION ORAL 2 TIMES DAILY
Qty: 200 ML | Refills: 0 | Status: SHIPPED | OUTPATIENT
Start: 2021-09-29 | End: 2021-11-23

## 2021-09-29 RX ORDER — FLUCONAZOLE 100 MG/1
TABLET ORAL
Qty: 8 TABLET | Refills: 0 | Status: SHIPPED | OUTPATIENT
Start: 2021-09-29 | End: 2021-11-23

## 2021-09-29 NOTE — TELEPHONE ENCOUNTER
----- Message from Mariama Montoya RN sent at 9/28/2021  2:32 PM CDT -----  Please help look into this on Wednesday.     Thanks,  A  ----- Message -----  From: Tram Florian MD  Sent: 9/24/2021  11:23 AM CDT  To: Cassy Thompson, p [facility]    I dont have the MRI results. Can someone please work on obtaining the lumbar MRI results from [facility]  VG  ----- Message -----  From: Cassy Thompson  Sent: 9/21/2021   9:57 AM CDT  To: Tram Florian MD    Hello Dr. Florian,I have Zayra scheduled for the neck injections you placed orders for, and she is wondering if there will be orders for her back as well. She says her back pain is getting bad, and that she had the MRI done at [facility] last month. I was not able to find imaging results in her chart, but Zayra says she has had [facility].Please let me know if you place additional orders and I can call Zayra again to schedule.Thank you,Cassy

## 2021-09-29 NOTE — LETTER
9/29/2021       RE: Zayra Ramirez  [address] [place] [address]     Dear Colleague,    Thank you for referring your patient, Zayra Ramirez, to the [facility] EAR NOSE AND THROAT CLINIC [place] [facility]. Please see a copy of my visit note below.    September 29, 2021    Prior Medical History: Zayra Ramirez is a 51 year old female with a history of recurrent oral ulcers. She was last seen in clinic by Dr. Mijares on 9/14/21 where she was found to have a right lateral tongue ulcer and improving oral thrush. She was started on dexamethasone mouth rinses and mycelex lozenges with instructions to follow up in 3 weeks via telephone visit with Dr. Mijares.     Patient showed up to the clinic today believing she had an appointment with Dr. Mijares. She states that she is out of her medications and is concerned that her symptoms, that had been improving with the prescribed regimen, are now worsening now that she is off the medications.     Continues to have right tongue and cheek pain. Throat is scratchy and dry with intermittent dysphagia due to dryness. States that she feels that her voice has gotten better. Denies any bleeding from the mouth or hemoptysis. Denies ear pain. She is scheduled to start [facility] therapy on Friday this week.     Past Medical History:  Past Medical History:   Diagnosis Date     Allergic rhinitis      Anemia      Arthritis      Asthma     copd     Dental abscess 8-2015     Depressive disorder      Gastroesophageal reflux disease      History of emphysema      Hoarseness      Hypertension      Morbid obesity with BMI of 40.0-44.9, adult (H)      Obstructive sleep apnea      Other chronic pain      Respiratory bronchiolitis interstitial lung disease (H)      Sleep apnea      Past Surgical History:  Past Surgical History:   Procedure Laterality Date     CERVICAL FUSION       CHOLECYSTECTOMY       COLONOSCOPY        COLONOSCOPY N/A 2/6/2020    Procedure: COLONOSCOPY, WITH POLYPECTOMY AND BIOPSY;  Surgeon: Julian Mccullough MD;  [facility]     ENT SURGERY       ESOPHAGOSCOPY, GASTROSCOPY, DUODENOSCOPY (EGD), COMBINED N/A 2/6/2020    Procedure: ESOPHAGOGASTRODUODENOSCOPY (EGD);  Surgeon: Julian Mccullough MD;  [facility]     EXCISE LESION INTRAORAL Bilateral 10/3/2018    Procedure: EXCISE LESION INTRAORAL;  Wide Local Excision Of of Left Oral Cavity Ulcer;  Surgeon: Morro Mijares MD;  [facility]     HC DRAIN SKIN ABSCESS SIMPLE/SINGLE  3/16/2012    Procedure:INCISION AND DRAINAGE, ABSCESS, SIMPLE; Surgeon:CHRISTIANO HANCOCK; Location: GI     HEAD & NECK SURGERY       HYSTERECTOMY       HYSTERECTOMY       INCISION AND DRAINAGE ABDOMEN WASHOUT, COMBINED       INJECT EPIDURAL LUMBAR Right 9/15/2020    Procedure: Lumbar5- sacral 1 epidural steroid injection with fluoroscopy;  Surgeon: Tram Florian MD;  [facility]     INJECT EPIDURAL LUMBAR Right 6/29/2021    Procedure: Lumbar 5 sacral 1 epidural steroid injection with fluoroscopy;  Surgeon: Tram Florian MD;  [facility]     INJECT SACROILIAC JOINT Bilateral 6/16/2020    Procedure: Bilateral sacroiliac joint steroid injection with fluoroscopy;  Surgeon: Tram Florian MD;  [facility]     LAMINECTOMY THORACIC ONE LEVEL N/A 8/19/2019    Procedure: LAMINECTOMY, SPINE, THORACIC, 11-12 and Part of Lumbar 1, DRAINAGE OF EPIDURAL ABCESS, Epidural Drain Placement X 2;  Surgeon: Yadiel Beal MD;  [facility]     AK PERCUT IMPLNT NEUROELECT,EPIDURAL N/A 8/8/2019    Procedure: TRIAL, SPINAL CORD STIMULATOR WITH BOSTON SCIENTIFIC;  Surgeon: Sipple, Daniel Peter, DO;  [facility]: [facility];  Service: Pain     RADIO FREQUENCY ABLATION / DESTRUCTION OF SACROILOAC JOINT DORSAL PRIMARY RAMUS Bilateral 12/17/2019    Procedure: Bilateral lumbar radiofrequency ablation with fluoroscopy and intravenous sedation ( Lumbar  2,3,4,5 medial branch nerves for the bilateral lumbar3-4, 4-5 and 5-sacral1 joints.;  Surgeon: Tram Florian MD;  [facility]     RADIO FREQUENCY ABLATION / DESTRUCTION OF SACROILOAC JOINT DORSAL PRIMARY RAMUS Right 11/17/2020    Procedure: Right lumbar medial branch nerve radiofrequency ablation right L2,3,4,5 nerves supplying the right L3-4, L4-5 and L5-S1 facet joints;  Surgeon: Tram Florian MD;  [facility]     spinal cord stimulator  08/08/2019     spinal cord stimulator removal  08/13/2019     Medications:  Current Outpatient Medications   Medication Sig Dispense Refill     clotrimazole (MYCELEX) 10 MG lozenge Allow 1 nakul to dissolve slowly in mouth 3 times daily for 14 days 42 lozenge 0     dexamethasone AF (DECADRON) 0.1 MG/ML solution Swish and spit 10 mLs (1 mg) in mouth 2 times daily 200 mL 0     fluconazole (DIFLUCAN) 100 MG tablet Take 2 tabs on first day. Then 100 mg daily for 6 days. 8 tablet 0     albuterol (PROAIR HFA, PROVENTIL HFA, VENTOLIN HFA) 108 (90 BASE) MCG/ACT inhaler Inhale 2 puffs into the lungs every 6 hours as needed for shortness of breath / dyspnea or wheezing (PT last dose 1.23.2020)        ARIPiprazole (ABILIFY) 15 MG tablet Take 15 mg by mouth At Bedtime        busPIRone HCl (BUSPAR) 30 MG tablet Take 15 mg by mouth At Bedtime       carboxymethylcellulose (CARBOXYMETHYLCELLULOSE SODIUM) 0.5 % SOLN ophthalmic solution Place 1 drop into both eyes 3 times daily as needed for dry eyes 15 mL 11     celecoxib (CELEBREX) 200 MG capsule Take 1 capsule (200 mg) by mouth every morning 60 capsule 4     cetirizine (ZYRTEC) 10 MG tablet Take 1 tablet (10 mg) by mouth daily 69 tablet 3     cholecalciferol ( ULTRA STRENGTH) 2000 units CAPS TAKE ONE CAPSULE BY MOUTH DAILY IN AM       cyclobenzaprine (FLEXERIL) 10 MG tablet Take 1 tablet (10 mg) by mouth daily 90 tablet 1     DULoxetine (CYMBALTA) 60 MG capsule Take 120 mg by mouth At Bedtime        EPINEPHrine  (EPIPEN/ADRENACLICK/OR ANY BX GENERIC EQUIV) 0.3 MG/0.3ML injection 2-pack INJECT 0.3ML INTO THE MUSCLE ONCE AS NEEDED FOR ANAPHYLAXIS       ferrous sulfate (FEROSUL) 325 (65 Fe) MG tablet Take 325 mg by mouth daily (with breakfast)   0     folic acid (FOLVITE) 1 MG tablet Take 1 tablet (1 mg) by mouth daily 90 tablet 3     lisinopril-hydrochlorothiazide (ZESTORETIC) 20-25 MG tablet Take 1 tablet by mouth daily 90 tablet 3     methotrexate sodium 2.5 MG TABS TAKE 9 TABLETS BY MOUTH ONCE WEEKLY  Labs  past due. appt past due. 108 tablet 5     montelukast (SINGULAIR) 10 MG tablet Take 10 mg by mouth At Bedtime       nystatin (MYCOSTATIN) 796187 UNIT/GM external cream Apply topically 2 times daily 30 g 3     omeprazole (PRILOSEC) 40 MG DR capsule Take 1 capsule (40 mg) by mouth daily 180 capsule 3     OXYGEN-HELIUM IN 2-3L PRN during the day, 3L @ night       pregabalin (LYRICA) 150 MG capsule Take 1 capsule (150 mg) by mouth 2 times daily 60 capsule 3     Respiratory Therapy Supplies ([facility] CPAP FILTER) MISC        rOPINIRole (REQUIP) 0.5 MG tablet Take 1 tablet (0.5 mg) by mouth At Bedtime Take 1 tab by mouth at bedtime. 90 tablet 1     traMADol (ULTRAM) 50 MG tablet Take 1 tablet (50 mg) by mouth nightly as needed for severe pain 30 tablet 0     vitamin C 500 MG TABS Take 500 mg by mouth daily (with lunch)        zinc sulfate (ZINCATE) 220 (50 Zn) MG capsule Take 220 mg by mouth daily (with lunch)        Allergies:  Allergies   Allergen Reactions     Bee Venom Anaphylaxis     Bees      Doxycycline Anaphylaxis     Patient thinks it may have been just nausea and vomiting, however unable to confirm     Erythromycin Anaphylaxis and Shortness Of Breath     Other reaction(s): Vomiting     Hydrocodone-Acetaminophen Itching      Social History:  Social History     Tobacco Use     Smoking status: Current Every Day Smoker     Packs/day: 1.00     Years: 30.00     Pack years: 30.00     Types: Cigarettes     Start  date: 1/1/1996     Smokeless tobacco: Never Used     Tobacco comment: has tried the patch   Substance Use Topics     Alcohol use: No     Drug use: Not Currently     Types: Marijuana     Comment: very rarely      ROS: 10 point ROS neg other than the symptoms noted above in the HPI.    Physical Exam:    LMP  (LMP Unknown)   Wt Readings from Last 3 Encounters:   09/14/21 100.7 kg (222 lb)   09/03/21 98.4 kg (217 lb)   08/31/21 99.8 kg (220 lb)      Constitutional:  The patient was unaccompanied, well-groomed, and in no acute distress.     Neurologic: Alert and oriented x 3.   Psychiatric: The patient's affect was calm, cooperative, and appropriate.     Communication:  Normal; communicates verbally, Raspy, tight voice quality.    Respiratory: Breathing comfortably without stridor or exertion of accessory muscles.    Head/Face:  Normocephalic and atraumatic.  No lesions or scars.   Salivary glands -  Normal size, no tenderness, swelling, or palpable masses    Oral Cavity: Soft, tender 5 mm ulcer on right, lateral tongue.  Normal floor of mouth and palate. Bilateral scarring on posterior buccal mucosa on level of bite.   Oropharynx: Normal mucosa, palate symmetric with normal elevation. No abnormal lymph tissue in the oropharynx.     Neck: Supple with normal laryngeal and tracheal landmarks.  The parotid beds were without masses.  No palpable thyroid.  Normal range of motion.    Lymphatic: There is no palpable lymphadenopathy in the neck.      Flexible fiberoptic laryngoscopy: Scope exam was indicated due to hoarseness. Verbal consent was obtained. The nasal cavity was prepped with an aerosolized solution of topical anesthetic and vasoconstrictive agent. The scope was passed through the anterior nasal cavity and advanced. Inspection of the nasopharynx revealed no gross abnormality. The base of tongue, vallecula, and epiglottis are normal. The AE folds, false cords, true cords, arytenoids are edematous consistent with  patient history of smoking. True cords are erythematous with minute white debris suspicious for candida. Inspection of the larynx revealed bilaterally mobile vocal cords. Pyriform sinuses are symmetric. The airway is patent. Procedure tolerated well with no immediate complications noted.        Assessment/Plan:  1. Stomatitis and mucositis  Patient with persistent ulcer of the tongue that appears to be healing, although slow. Will reorder dexamethasone solution.     2. Laryngeal candidiasis  Scope exam concerning for laryngeal candidiasis. Will treat with fluconazole x 7 days. Also reordered Mycelex lozenges for patient as she feels that these have provided a lot of benefit for oral thrush symptoms. '    Patient to follow up via phone visit with Dr. Mijares in 4 weeks. This appointment has already been scheduled.     Coty Laurent DNP, APRN, CNP  Otolaryngology  Head & Neck Surgery  [phone]    45 minutes spent on the date of the encounter doing chart review, history and exam, documentation and further activities per the note        Again, thank you for allowing me to participate in the care of your patient.      Sincerely,    Latoya Laurent, NP

## 2021-09-29 NOTE — PROGRESS NOTES
September 29, 2021    Prior Medical History: Zayra Ramirez is a 51 year old female with a history of recurrent oral ulcers. She was last seen in clinic by Dr. Mijares on 9/14/21 where she was found to have a right lateral tongue ulcer and improving oral thrush. She was started on dexamethasone mouth rinses and mycelex lozenges with instructions to follow up in 3 weeks via telephone visit with Dr. Mijares.     Patient showed up to the clinic today believing she had an appointment with Dr. Mijares. She states that she is out of her medications and is concerned that her symptoms, that had been improving with the prescribed regimen, are now worsening now that she is off the medications.     Continues to have right tongue and cheek pain. Throat is scratchy and dry with intermittent dysphagia due to dryness. States that she feels that her voice has gotten better. Denies any bleeding from the mouth or hemoptysis. Denies ear pain. She is scheduled to start [facility] therapy on Friday this week.     Past Medical History:  Past Medical History:   Diagnosis Date     Allergic rhinitis      Anemia      Arthritis      Asthma     copd     Dental abscess 8-2015     Depressive disorder      Gastroesophageal reflux disease      History of emphysema      Hoarseness      Hypertension      Morbid obesity with BMI of 40.0-44.9, adult (H)      Obstructive sleep apnea      Other chronic pain      Respiratory bronchiolitis interstitial lung disease (H)      Sleep apnea      Past Surgical History:  Past Surgical History:   Procedure Laterality Date     CERVICAL FUSION       CHOLECYSTECTOMY       COLONOSCOPY       COLONOSCOPY N/A 2/6/2020    Procedure: COLONOSCOPY, WITH POLYPECTOMY AND BIOPSY;  Surgeon: Julian Mccullough MD;  Location:  GI     ENT SURGERY       ESOPHAGOSCOPY, GASTROSCOPY, DUODENOSCOPY (EGD), COMBINED N/A 2/6/2020    Procedure: ESOPHAGOGASTRODUODENOSCOPY (EGD);  Surgeon: Julian Mccullough MD;  Location:  GI      EXCISE LESION INTRAORAL Bilateral 10/3/2018    Procedure: EXCISE LESION INTRAORAL;  Wide Local Excision Of of Left Oral Cavity Ulcer;  Surgeon: Morro Mijares MD;  [facility]     HC DRAIN SKIN ABSCESS SIMPLE/SINGLE  3/16/2012    Procedure:INCISION AND DRAINAGE, ABSCESS, SIMPLE; Surgeon:CHRISTIANO HANCOCK; Location: GI     HEAD & NECK SURGERY       HYSTERECTOMY       HYSTERECTOMY       INCISION AND DRAINAGE ABDOMEN WASHOUT, COMBINED       INJECT EPIDURAL LUMBAR Right 9/15/2020    Procedure: Lumbar5- sacral 1 epidural steroid injection with fluoroscopy;  Surgeon: Tram Florian MD;  [facility]     INJECT EPIDURAL LUMBAR Right 6/29/2021    Procedure: Lumbar 5 sacral 1 epidural steroid injection with fluoroscopy;  Surgeon: Tram Florian MD;  [facility]     INJECT SACROILIAC JOINT Bilateral 6/16/2020    Procedure: Bilateral sacroiliac joint steroid injection with fluoroscopy;  Surgeon: Tram Florian MD;  [facility]     LAMINECTOMY THORACIC ONE LEVEL N/A 8/19/2019    Procedure: LAMINECTOMY, SPINE, THORACIC, 11-12 and Part of Lumbar 1, DRAINAGE OF EPIDURAL ABCESS, Epidural Drain Placement X 2;  Surgeon: Yadiel Beal MD;  [facility]     [place] PERCUT IMPLNT NEUROELECT,EPIDURAL N/A 8/8/2019    Procedure: TRIAL, SPINAL CORD STIMULATOR WITH BOSTON SCIENTIFIC;  Surgeon: Sipple, Daniel Peter, DO;  Location: [facility];  Service: Pain     RADIO FREQUENCY ABLATION / DESTRUCTION OF SACROILOAC JOINT DORSAL PRIMARY RAMUS Bilateral 12/17/2019    Procedure: Bilateral lumbar radiofrequency ablation with fluoroscopy and intravenous sedation ( Lumbar 2,3,4,5 medial branch nerves for the bilateral lumbar3-4, 4-5 and 5-sacral1 joints.;  Surgeon: Tram Florian MD;  [facility]     RADIO FREQUENCY ABLATION / DESTRUCTION OF SACROILOAC JOINT DORSAL PRIMARY RAMUS Right 11/17/2020    Procedure: Right lumbar medial branch nerve radiofrequency ablation right L2,3,4,5 nerves supplying  the right L3-4, L4-5 and L5-S1 facet joints;  Surgeon: Tram Florian MD;  [facility]     spinal cord stimulator  08/08/2019     spinal cord stimulator removal  08/13/2019     Medications:  Current Outpatient Medications   Medication Sig Dispense Refill     clotrimazole (MYCELEX) 10 MG lozenge Allow 1 nakul to dissolve slowly in mouth 3 times daily for 14 days 42 lozenge 0     dexamethasone AF (DECADRON) 0.1 MG/ML solution Swish and spit 10 mLs (1 mg) in mouth 2 times daily 200 mL 0     fluconazole (DIFLUCAN) 100 MG tablet Take 2 tabs on first day. Then 100 mg daily for 6 days. 8 tablet 0     albuterol (PROAIR HFA, PROVENTIL HFA, VENTOLIN HFA) 108 (90 BASE) MCG/ACT inhaler Inhale 2 puffs into the lungs every 6 hours as needed for shortness of breath / dyspnea or wheezing (PT last dose 1.23.2020)        ARIPiprazole (ABILIFY) 15 MG tablet Take 15 mg by mouth At Bedtime        busPIRone HCl (BUSPAR) 30 MG tablet Take 15 mg by mouth At Bedtime       carboxymethylcellulose (CARBOXYMETHYLCELLULOSE SODIUM) 0.5 % SOLN ophthalmic solution Place 1 drop into both eyes 3 times daily as needed for dry eyes 15 mL 11     celecoxib (CELEBREX) 200 MG capsule Take 1 capsule (200 mg) by mouth every morning 60 capsule 4     cetirizine (ZYRTEC) 10 MG tablet Take 1 tablet (10 mg) by mouth daily 69 tablet 3     cholecalciferol ( ULTRA STRENGTH) 2000 units CAPS TAKE ONE CAPSULE BY MOUTH DAILY IN AM       cyclobenzaprine (FLEXERIL) 10 MG tablet Take 1 tablet (10 mg) by mouth daily 90 tablet 1     DULoxetine (CYMBALTA) 60 MG capsule Take 120 mg by mouth At Bedtime        EPINEPHrine (EPIPEN/ADRENACLICK/OR ANY BX GENERIC EQUIV) 0.3 MG/0.3ML injection 2-pack INJECT 0.3ML INTO THE MUSCLE ONCE AS NEEDED FOR ANAPHYLAXIS       ferrous sulfate (FEROSUL) 325 (65 Fe) MG tablet Take 325 mg by mouth daily (with breakfast)   0     folic acid (FOLVITE) 1 MG tablet Take 1 tablet (1 mg) by mouth daily 90 tablet 3      lisinopril-hydrochlorothiazide (ZESTORETIC) 20-25 MG tablet Take 1 tablet by mouth daily 90 tablet 3     methotrexate sodium 2.5 MG TABS TAKE 9 TABLETS BY MOUTH ONCE WEEKLY  Labs  past due. appt past due. 108 tablet 5     montelukast (SINGULAIR) 10 MG tablet Take 10 mg by mouth At Bedtime       nystatin (MYCOSTATIN) 375412 UNIT/GM external cream Apply topically 2 times daily 30 g 3     omeprazole (PRILOSEC) 40 MG DR capsule Take 1 capsule (40 mg) by mouth daily 180 capsule 3     OXYGEN-HELIUM IN 2-3L PRN during the day, 3L @ night       pregabalin (LYRICA) 150 MG capsule Take 1 capsule (150 mg) by mouth 2 times daily 60 capsule 3     Respiratory Therapy Supplies ([facility] CPAP FILTER) MISC        rOPINIRole (REQUIP) 0.5 MG tablet Take 1 tablet (0.5 mg) by mouth At Bedtime Take 1 tab by mouth at bedtime. 90 tablet 1     traMADol (ULTRAM) 50 MG tablet Take 1 tablet (50 mg) by mouth nightly as needed for severe pain 30 tablet 0     vitamin C 500 MG TABS Take 500 mg by mouth daily (with lunch)        zinc sulfate (ZINCATE) 220 (50 Zn) MG capsule Take 220 mg by mouth daily (with lunch)        Allergies:  Allergies   Allergen Reactions     Bee Venom Anaphylaxis     Bees      Doxycycline Anaphylaxis     Patient thinks it may have been just nausea and vomiting, however unable to confirm     Erythromycin Anaphylaxis and Shortness Of Breath     Other reaction(s): Vomiting     Hydrocodone-Acetaminophen Itching      Social History:  Social History     Tobacco Use     Smoking status: Current Every Day Smoker     Packs/day: 1.00     Years: 30.00     Pack years: 30.00     Types: Cigarettes     Start date: 1/1/1996     Smokeless tobacco: Never Used     Tobacco comment: has tried the patch   Substance Use Topics     Alcohol use: No     Drug use: Not Currently     Types: Marijuana     Comment: very rarely      ROS: 10 point ROS neg other than the symptoms noted above in the HPI.    Physical Exam:    LMP  (LMP Unknown)   Wt  Readings from Last 3 Encounters:   09/14/21 100.7 kg (222 lb)   09/03/21 98.4 kg (217 lb)   08/31/21 99.8 kg (220 lb)      Constitutional:  The patient was unaccompanied, well-groomed, and in no acute distress.     Neurologic: Alert and oriented x 3.   Psychiatric: The patient's affect was calm, cooperative, and appropriate.     Communication:  Normal; communicates verbally, Raspy, tight voice quality.    Respiratory: Breathing comfortably without stridor or exertion of accessory muscles.    Head/Face:  Normocephalic and atraumatic.  No lesions or scars.   Salivary glands -  Normal size, no tenderness, swelling, or palpable masses    Oral Cavity: Soft, tender 5 mm ulcer on right, lateral tongue.  Normal floor of mouth and palate. Bilateral scarring on posterior buccal mucosa on level of bite.   Oropharynx: Normal mucosa, palate symmetric with normal elevation. No abnormal lymph tissue in the oropharynx.     Neck: Supple with normal laryngeal and tracheal landmarks.  The parotid beds were without masses.  No palpable thyroid.  Normal range of motion.    Lymphatic: There is no palpable lymphadenopathy in the neck.      Flexible fiberoptic laryngoscopy: Scope exam was indicated due to hoarseness. Verbal consent was obtained. The nasal cavity was prepped with an aerosolized solution of topical anesthetic and vasoconstrictive agent. The scope was passed through the anterior nasal cavity and advanced. Inspection of the nasopharynx revealed no gross abnormality. The base of tongue, vallecula, and epiglottis are normal. The AE folds, false cords, true cords, arytenoids are edematous consistent with patient history of smoking. True cords are erythematous with minute white debris suspicious for candida. Inspection of the larynx revealed bilaterally mobile vocal cords. Pyriform sinuses are symmetric. The airway is patent. Procedure tolerated well with no immediate complications noted.        Assessment/Plan:  1. Stomatitis and  mucositis  Patient with persistent ulcer of the tongue that appears to be healing, although slow. Will reorder dexamethasone solution.     2. Laryngeal candidiasis  Scope exam concerning for laryngeal candidiasis. Will treat with fluconazole x 7 days. Also reordered Mycelex lozenges for patient as she feels that these have provided a lot of benefit for oral thrush symptoms. '    Patient to follow up via phone visit with Dr. Mijares in 4 weeks. This appointment has already been scheduled.     Coty Laurent DNP, APRN, CNP  Otolaryngology  Head & Neck Surgery  [phone]    45 minutes spent on the date of the encounter doing chart review, history and exam, documentation and further activities per the note

## 2021-09-30 ENCOUNTER — TELEPHONE (OUTPATIENT)
Dept: GASTROENTEROLOGY | Facility: CLINIC | Age: 51
End: 2021-09-30

## 2021-09-30 NOTE — TELEPHONE ENCOUNTER
"I spoke with Zayra Ramirez about possible scheduling a HBT.     Staff message was from Yvan Black, RN stating \"Please schedule for hydrogen breath test and call the pt.\"     Pt did not have an order and was wanting to speak with Neena Tam about her symptoms that are improving.     Message sent to Yvan about this issue                    "

## 2021-10-01 ENCOUNTER — TELEPHONE (OUTPATIENT)
Dept: ANESTHESIOLOGY | Facility: CLINIC | Age: 51
End: 2021-10-01

## 2021-10-01 ENCOUNTER — VIRTUAL VISIT (OUTPATIENT)
Dept: OTOLARYNGOLOGY | Facility: CLINIC | Age: 51
End: 2021-10-01
Payer: COMMERCIAL

## 2021-10-01 ENCOUNTER — TELEPHONE (OUTPATIENT)
Dept: INTERNAL MEDICINE | Facility: CLINIC | Age: 51
End: 2021-10-01

## 2021-10-01 ENCOUNTER — PRE VISIT (OUTPATIENT)
Dept: OTOLARYNGOLOGY | Facility: CLINIC | Age: 51
End: 2021-10-01

## 2021-10-01 DIAGNOSIS — J38.1 VOCAL CORD POLYPS: ICD-10-CM

## 2021-10-01 DIAGNOSIS — R49.0 DYSPHONIA: Primary | ICD-10-CM

## 2021-10-01 DIAGNOSIS — J38.7 LARYNGEAL HYPERFUNCTION: ICD-10-CM

## 2021-10-01 PROCEDURE — 92524 BEHAVRAL QUALIT ANALYS VOICE: CPT | Mod: GN | Performed by: SPEECH-LANGUAGE PATHOLOGIST

## 2021-10-01 PROCEDURE — 92507 TX SP LANG VOICE COMM INDIV: CPT | Mod: GN | Performed by: SPEECH-LANGUAGE PATHOLOGIST

## 2021-10-01 NOTE — PATIENT INSTRUCTIONS
"Hello!    It was a pleasure to see you today. Please complete the exercises below and remember, a few minutes of practice many times throughout the day is more important than one large practice session. If you have any questions about your strategies, feel free to contact me at [email or web address] or via [employer]. Please call [phone] for scheduling alterations.     Please know that this email will be automatically deleted from the  after 30 days, so please COPY AND SAVE your exercises and handouts to your own computer.     Home Exercise Program:  VOICE EXERCISES (AM & PM, more if needed)  -- Inhale SILENTLY and feel your belly fill with air  -- Slowly exhale as your belly contracts, ROUND \"OO\" LIPS  -- Straw and 1 inch of water in a cup  -- \"Whoooooo\" (smooth bubbles like a motor boat or a simmering pot of water)    3x silently with just air for 5-7 seconds    3x short easy sighs on \"whooo\"     3x foghorn on single pitch for 5-7 seconds    3x sliding lower for 5-7 seconds (\"ding dong\")    3x sliding higher for 5-7 seconds (\"here comes the bride\")    3x sliding up and down like sirens  -- Double-check that your LIPS are ROUNDED, jaw is relaxed          Thank you and have a great day!  Shara      "

## 2021-10-01 NOTE — PROGRESS NOTES
Zayra Ramirez is a 51 year old female who is being evaluated via a billable video visit.      Zayra has been notified and verbally consented to the following:     This video visit will be conducted between you and your provider.    Patient has opted to conduct today's video visit vs an in-person appointment.     Video visits are billed at different rates depending on your insurance coverage. Please reach out to your insurance provider with any questions.     If during the course of the call the provider feels the appointment is not appropriate, you will not be charged for this service.  Provider has received verbal consent for billable virtual visit from the patient? Yes  Will anyone else be joining your video visit? No    Call initiated at: 1100   Type of Visit Platform Used: LUMI Mask Video  Location of provider: Home  Location of patient: [facility]  Ryan Villarreal Jr., M.D., F.A.C.S.  Donna Cesar M.D., M.P.H.  Sherin Musa M.D.  Darlin Huynh, Ph.D., CCC-SLP  Jose Reddy, Ph.D., [facility]-SLP  Rosy Pino M.M. (voice), M.A., CCC-SLP  Jonathan Schneider M.M. (voice), M.A., CCC-SLP  LENNY Rendon (voice), M.S., CCC-SLP  [facility]  INITIAL EVALUATION AND THERAPY REPORT    Patient: Zayra Ramirez  Date of Visit: 10/1/2021    REASON FOR REFERRAL  R49.0 (Dysphonia)/Evaluate, perform laryngeal exam, treat as appropriate    HISTORY  PATIENT INFORMATION  Zayra Ramirez is a 51 year old female presenting today for evaluation of dysphonia and was referred to this clinic by Dr. Mijares.  Salient details of her symptom history are as follows:    Chief complaint: Zayra initially was in [place] and thought her voice changes were due to allergies. Her voice was raspy, but when she got home to [place], she had yeast infections in multiple areas and has since been on multiple rounds of antifungal medications. Her voice has been improving with the antifungals, particularly over the past three  weeks.     Onset: End of February 2021     Course: Improving    Salient history: She has a history significant for thrush.    CURRENT SYMPTOMS INCLUDE:    VOICE: Her throat does hurt as much to talk and she doesn't have to use as much effort, whereas before she would try to talk and nothing would come out at all. She'd have to take another breath and push even harder to then get her voice out. Her voice continues to fatigue throughout the day and gets more painful. She had a deep voice to start with, but her voice is now lower and raspier. She uses a mouthwash and lozenge for mouth ulcers and sores. She's had to speak slower and more carefully to be understood on the phone.    COUGH/THROAT CLEAR: Both were present before [place] and thrush infection, but worsened significantly with the thrush. She feels like the previous cough came from her chest due to smoking, but the more recent cough and throat clear has come from her throat and produces small pieces of white gunk and thick mucus.     SWALLOWING/GLOBUS/SENSITIVITY: Swallowing is much more difficult, particularly over the past 7 years. She had gradual worsening of her swallow without a particular inciting incident, to the point that she will choke on liquids, pills, saliva, and solids. She has difficulty with initiating the swallow and feels like once the swallow starts, things get stuck in her throat, typically on the left side of her throat. She'll try to chew really well and take small bites, eating slower overall, but it still happens fairly often. On average, she estimates choking issues 3-5x/week.     BREATHING: Denies at this time, but did feel like she had to work harder to inhale when the thrush infection was worse.    SYSTEMIC FACTORS    Hydration:  o Water: 64-96 oz/day  o Caffeine: up to 48 oz mountain dew/day  o Alcohol: none  o Other: occasional fruit juice    Smoking: She's cut down to 1/2 of what she was smoking before and is using smoking  cessation aides. She was at 2PPD and is now at under 1 PPD (<20 cigarettes/day).    Stress Level: 5/10, has done DBT training    Sleep: 10+ hours/night, doesn't sleep soundly    Medications: clotrimazole, decadron, diflucan, omeprazole, cetirizine, albuterol, singulair    Reflux: Heartburn is managed with omeprazole, but she still feels regurgitation sometimes, particularly if she bends over. Regurgitation occurs about almost daily.     Post-Nasal Drip/Congestion: Seasonally    Neck/Back/Jaw Pain and/or Tension: Chronic pain in her back due to RA, disc degeneration, etc.  OTHER PERTINENT HISTORY    Complex medical history: please also refer to chart.     Past Medical History:   Diagnosis Date     Allergic rhinitis      Anemia      Arthritis      Asthma     copd     Dental abscess 8-2015     Depressive disorder      Gastroesophageal reflux disease      History of emphysema      Hoarseness      Hypertension      Morbid obesity with BMI of 40.0-44.9, adult (H)      Obstructive sleep apnea      Other chronic pain      Respiratory bronchiolitis interstitial lung disease (H)      Sleep apnea      Past Surgical History:   Procedure Laterality Date     CERVICAL FUSION       CHOLECYSTECTOMY       COLONOSCOPY       COLONOSCOPY N/A 2/6/2020    Procedure: COLONOSCOPY, WITH POLYPECTOMY AND BIOPSY;  Surgeon: Julian Mccullough MD;  Location:  GI     ENT SURGERY       ESOPHAGOSCOPY, GASTROSCOPY, DUODENOSCOPY (EGD), COMBINED N/A 2/6/2020    Procedure: ESOPHAGOGASTRODUODENOSCOPY (EGD);  Surgeon: Julian Mccullough MD;  Location:  GI     EXCISE LESION INTRAORAL Bilateral 10/3/2018    Procedure: EXCISE LESION INTRAORAL;  Wide Local Excision Of of Left Oral Cavity Ulcer;  Surgeon: Morro Mijares MD;  Location:  OR      DRAIN SKIN ABSCESS SIMPLE/SINGLE  3/16/2012    Procedure:INCISION AND DRAINAGE, ABSCESS, SIMPLE; Surgeon:CHRISTIANO HANCOCK; Location: GI     HEAD & NECK SURGERY       HYSTERECTOMY        HYSTERECTOMY       INCISION AND DRAINAGE ABDOMEN WASHOUT, COMBINED       INJECT EPIDURAL LUMBAR Right 9/15/2020    Procedure: Lumbar5- sacral 1 epidural steroid injection with fluoroscopy;  Surgeon: Tram Florian MD;  [facility]     INJECT EPIDURAL LUMBAR Right 6/29/2021    Procedure: Lumbar 5 sacral 1 epidural steroid injection with fluoroscopy;  Surgeon: Tarm Florian MD;  Location: [facility] OR     INJECT SACROILIAC JOINT Bilateral 6/16/2020    Procedure: Bilateral sacroiliac joint steroid injection with fluoroscopy;  Surgeon: Tram Florian MD;  [facility]     LAMINECTOMY THORACIC ONE LEVEL N/A 8/19/2019    Procedure: LAMINECTOMY, SPINE, THORACIC, 11-12 and Part of Lumbar 1, DRAINAGE OF EPIDURAL ABCESS, Epidural Drain Placement X 2;  Surgeon: Yadiel Beal MD;  Location: [facility] OR     MO PERCUT IMPLNT NEUROELECT,EPIDURAL N/A 8/8/2019    Procedure: TRIAL, SPINAL CORD STIMULATOR WITH BOSTON SCIENTIFIC;  Surgeon: Sipple, Daniel Peter, DO;  Location: [facility];  Service: Pain     RADIO FREQUENCY ABLATION / DESTRUCTION OF SACROILOAC JOINT DORSAL PRIMARY RAMUS Bilateral 12/17/2019    Procedure: Bilateral lumbar radiofrequency ablation with fluoroscopy and intravenous sedation ( Lumbar 2,3,4,5 medial branch nerves for the bilateral lumbar3-4, 4-5 and 5-sacral1 joints.;  Surgeon: Tram Florian MD;  [facility]     RADIO FREQUENCY ABLATION / DESTRUCTION OF SACROILOAC JOINT DORSAL PRIMARY RAMUS Right 11/17/2020    Procedure: Right lumbar medial branch nerve radiofrequency ablation right L2,3,4,5 nerves supplying the right L3-4, L4-5 and L5-S1 facet joints;  Surgeon: Tram Florian MD;  Location: [facility] OR     spinal cord stimulator  08/08/2019     spinal cord stimulator removal  08/13/2019       OBJECTIVE FINDINGS   Patient Supplied Answers To VHI Questionnaire  Voice Handicap Index (VHI-10) 10/1/2021   My voice makes it difficult for people to hear me 3   People have difficulty understanding me in  "a noisy room 3   My voice difficulties restrict my personal and social life.  1   I feel left out of conversations because of my voice 2   My voice problem causes me to lose income 0   I feel as though I have to strain to produce voice 3   The clarity of my voice is unpredictable 4   My voice problem upsets me 4   My voice makes me feel handicapped 2   People ask, \"What's wrong with your voice?\" 2   VHI-10 24        Patient Supplied Answers To CSI Questionnaire  Cough Severity Index (CSI) 10/1/2021   My cough is worse when I lie down 2   My coughing problem causes me to restrict my personal and social life 1   I tend to avoid places because of my cough problem 2   I feel embarrassed because of my coughing problem 2   People ask, ''What's wrong?'' because I cough a lot 1   I run out of air when I cough 2   My coughing problem affects my voice 2   My coughing problem limits my physical activity 3   My coughing problem upsets me 2   People ask me if I am sick because I cough a lot 2   CSI Score 19        Patient Supplied Answers To EAT Questionnaire  Eating Assessment Tool (EAT-10) 10/1/2021   My swallowing problem has caused me to lose weight 1   My swallowing problem interferes with my ability to go out for meals 1   Swallowing liquids takes extra effort 2   Swallowing solids takes extra effort 2   Swallowing pills takes extra effort 1   Swallowing is painful 2   The pleasure of eating is affected by my swallowing 2   When I swallow food sticks in my throat 4   I cough when I eat 3   Swallowing is stressful 2   EAT-10 20       PERCEPTUAL EVALUATION (20041)  VOICE/ SPEECH/ NON-COMMUNICATIVE LARYNGEAL BEHAVIORS EVALUATION    Self-guided Palpation of the laryngeal area shows:    tenderness of the thyrohyoid area    R>L     palpation did not induce cough    Breathing pattern:     appears within normal limits and adequate    Tension:     is not overtly evident    Cough/ Throat clear:    throat clear is primary and " "observed rarely during today's session     Zayra states today is a typical voice day, with clinician observing voice quality characterized by:    Roughness: Mild    Breathiness: Mild    Strain: Mild to moderate    Habitual pitch is 185 Hz and is WNL and appropriate, but sounds lower due to rough voice quality and backward focus    Pitch glide reveals range of 110 to 262 Hz    Loudness is WNL and is appropriate for the setting    Maximum Phonation Time: 7 seconds    GLOBAL ASSESSMENT OF DYSPHONIA: 40/100    IMAGE from Dr. Laurent's laryngoscopy on 9/29/21            _______________________________________________________________________  THERAPY NOTE (CPT 81989)  Date of Service: 10/1/2021    SUBJECTIVE / OBJECTIVE:  Please refer to my evaluation report from today's encounter for full details regarding subjective data, patient reported measures, and diagnostic findings.    THERAPEUTIC ACTIVITIES  Today Zayra participated in the following therapeutic activities:  Counseling and Education:    Asked questions about the nature of her symptoms, and I answered all of these thoroughly.    Therapy protocol and rationale, including plan for today's session and future sessions    Education of laryngeal anatomy and physiology    Information regarding the complex interactions between laryngeal function and self-perpetuating cycles of irritation and hyper-reactivity with application to her own reported symptoms and patterns of laryngeal impairment    Additional education regarding polypoid tissue and smoking related changes to the vocal fold mucosa    Exercises to coordinate phonation with optimal flowing airstream and reduce phonatory impact.     Semi-occluded vocal tract exercises with a straw and cup of 1-1.5\" water (\"straw and bubbles\") were most facilitating    She progressed through the basic bubble level of phonatory complexity, but was not yet able to produce voice with a lower phonation pressure    Patient " demonstrated fair learning, but will need practice and additional reinforcement    Instruction of Home Practice:    I instructed Zayra in the concepts of an optimal practice regimen, including use of an interval schedule with brief periods of practice frequently throughout each day, and concepts of volitional practice to facilitate motor learning.    I emphasized the therapeutic rational and provided an emailed After Visit Summary to reinforce today's therapeutic activities and facilitate home practice.    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 Perceptually, Zayra demonstrates moderately rough and strained voice quality, perilaryngeal tension, and was noted to throat clear occasionally during evaluation. Laryngoscopy without stroboscopy was completed by MD on 9/29/2021 revealing diffusely edematous laryngeal tissue with irregular vocal fold edges, small white areas indicative of ongoing thrush, and polypoid changes. Based on today's evaluation, Zayra would benefit from a course of speech therapy to improve laryngeal efficiency, reduce laryngeal irritation, improve voice quality and balance of intrinsic/extrinsic laryngeal musculature.     RECOMMENDATIONS:    Medically necessary speech therapy is warranted to improve voice quality and promote reduced discomfort, effort and fatigue.    Referral for swallow assessment, given patient's reports of frequent choking and food/liquids sticking in her throat.    She demonstrates a Good prognosis for improvement given adherence to therapeutic recommendations.    Positive indicators: high level of comittment    Negative indicators: None/Unremarkable  DURATION/FREQUENCY: Five bi-weekly, one-hour sessions    Goals:  Patient goal:    To understand the problem and fix it as much as  possible    Short-term goal(s): Within the first 4 sessions, Zayra will:  -- utilize vocal hygiene strategies in order to minimize laryngeal irritation and facilitate improved therapeutic outcomes with 90% accy.  -- demonstrate provided cough and throat clear suppression/substitution strategies from memory independently with 90% accuracy.  -- demonstrate silent inhalation and abdominal breathing pattern in order to optimize breathing mechanics with 90% accy and min cues.  -- demonstrate estela-laryngeal release and laryngeal massage techniques with >80% accy  -- coordinate appropriate air flow levels with forward resonance during phonation in order to minimize laryngeal compensation and effort with 90% accy.    Long-term goal(s): In 3 months, Zayra will:  -- report a 90% resolution of voice symptoms during a week of performing typical personal, social, and professional activities.    This treatment plan was developed with the patient who agreed with the recommendations.    PLAN: I will see Zayra in 2 weeks, at which time we will attempt to add voicing to straw bubbles, maintaining abdominal pattern, and instruct LPR/PND.     ICD-10 codes:  Laryngeal Hyperfunction (J38.7) and Dysphonia (R49.0)    TOTAL SERVICE TIME: 70 minutes  EVALUATION OF VOICE AND RESONANCE (42321)  TREATMENT (05244)  NO CHARGE FACILITY FEE (96972)    Chon Rendon (voice), M.S., CCC-SLP  Speech-Language Pathologist  [facility]  [phone]  [email or web address]  Pronouns: she/her/hers      *this report was created in part through the use of computerized dictation software, and though reviewed following completion, some typographic errors may persist.  If there is confusion regarding any of this notes contents, please contact me for clarification

## 2021-10-01 NOTE — LETTER
10/1/2021       RE: Zayra Ramirez  [address] [place] [address]     Dear Colleague,    Thank you for referring your patient, Zayra Ramirez, to the [facility] VOICE [facility]INIC [place] [facility]. Please see a copy of my visit note below.    Zayra Ramirez is a 51 year old female who is being evaluated via a billable video visit.      Zayra has been notified and verbally consented to the following:     This video visit will be conducted between you and your provider.    Patient has opted to conduct today's video visit vs an in-person appointment.     Video visits are billed at different rates depending on your insurance coverage. Please reach out to your insurance provider with any questions.     If during the course of the call the provider feels the appointment is not appropriate, you will not be charged for this service.  Provider has received verbal consent for billable virtual visit from the patient? Yes  Will anyone else be joining your video visit? No    Call initiated at: 1100   Type of Visit Platform Used: [employer] Video  Location of provider: Home  Location of patient: [facility] VOICE CLINIC  Ryan Villarreal Jr., M.D., F.A.C.S.  Donna Cesar M.D., M.P.H.  Sherin Musa M.D.  Darlin Huynh, Ph.D., CCC-SLP  Jose Reddy, Ph.D., CCC-SLP  Rosy Pino M.M. (voice), M.A., CCC-SLP  Jonathan Schneider M.M. (voice), M.A., CCC-SLP  LENNY Rendon (voice), M.S., CCC-SLP  [facility] VOICE [facility]  INITIAL EVALUATION AND THERAPY REPORT    Patient: Zayra Ramirez  Date of Visit: 10/1/2021    REASON FOR REFERRAL  R49.0 (Dysphonia)/Evaluate, perform laryngeal exam, treat as appropriate    HISTORY  PATIENT INFORMATION  Zayra Ramirez is a 51 year old female presenting today for evaluation of dysphonia and was referred to this clinic by Dr. Mijares.  Salient details of her symptom history are as follows:    Chief complaint: Zayra  initially was in [place] and thought her voice changes were due to allergies. Her voice was raspy, but when she got home to [place], she had yeast infections in multiple areas and has since been on multiple rounds of antifungal medications. Her voice has been improving with the antifungals, particularly over the past three weeks.     Onset: End of February 2021     Course: Improving    Salient history: She has a history significant for thrush.    CURRENT SYMPTOMS INCLUDE:    VOICE: Her throat does hurt as much to talk and she doesn't have to use as much effort, whereas before she would try to talk and nothing would come out at all. She'd have to take another breath and push even harder to then get her voice out. Her voice continues to fatigue throughout the day and gets more painful. She had a deep voice to start with, but her voice is now lower and raspier. She uses a mouthwash and lozenge for mouth ulcers and sores. She's had to speak slower and more carefully to be understood on the phone.    COUGH/THROAT CLEAR: Both were present before [place] and thrush infection, but worsened significantly with the thrush. She feels like the previous cough came from her chest due to smoking, but the more recent cough and throat clear has come from her throat and produces small pieces of white gunk and thick mucus.     SWALLOWING/GLOBUS/SENSITIVITY: Swallowing is much more difficult, particularly over the past 7 years. She had gradual worsening of her swallow without a particular inciting incident, to the point that she will choke on liquids, pills, saliva, and solids. She has difficulty with initiating the swallow and feels like once the swallow starts, things get stuck in her throat, typically on the left side of her throat. She'll try to chew really well and take small bites, eating slower overall, but it still happens fairly often. On average, she estimates choking issues 3-5x/week.     BREATHING: Denies at this time, but did  feel like she had to work harder to inhale when the thrush infection was worse.    SYSTEMIC FACTORS    Hydration:  o Water: 64-96 oz/day  o Caffeine: up to 48 oz mountain dew/day  o Alcohol: none  o Other: occasional fruit juice    Smoking: She's cut down to 1/2 of what she was smoking before and is using smoking cessation aides. She was at 2PPD and is now at under 1 PPD (<20 cigarettes/day).    Stress Level: 5/10, has done DBT training    Sleep: 10+ hours/night, doesn't sleep soundly    Medications: clotrimazole, decadron, diflucan, omeprazole, cetirizine, albuterol, singulair    Reflux: Heartburn is managed with omeprazole, but she still feels regurgitation sometimes, particularly if she bends over. Regurgitation occurs about almost daily.     Post-Nasal Drip/Congestion: Seasonally    Neck/Back/Jaw Pain and/or Tension: Chronic pain in her back due to RA, disc degeneration, etc.  OTHER PERTINENT HISTORY    Complex medical history: please also refer to chart.     Past Medical History:   Diagnosis Date     Allergic rhinitis      Anemia      Arthritis      Asthma     copd     Dental abscess 8-2015     Depressive disorder      Gastroesophageal reflux disease      History of emphysema      Hoarseness      Hypertension      Morbid obesity with BMI of 40.0-44.9, adult (H)      Obstructive sleep apnea      Other chronic pain      Respiratory bronchiolitis interstitial lung disease (H)      Sleep apnea      Past Surgical History:   Procedure Laterality Date     CERVICAL FUSION       CHOLECYSTECTOMY       COLONOSCOPY       COLONOSCOPY N/A 2/6/2020    Procedure: COLONOSCOPY, WITH POLYPECTOMY AND BIOPSY;  Surgeon: Julian Mccullough MD;  Location:  GI     ENT SURGERY       ESOPHAGOSCOPY, GASTROSCOPY, DUODENOSCOPY (EGD), COMBINED N/A 2/6/2020    Procedure: ESOPHAGOGASTRODUODENOSCOPY (EGD);  Surgeon: Julian Mccullough MD;  Location:  GI     EXCISE LESION INTRAORAL Bilateral 10/3/2018    Procedure: EXCISE LESION  INTRAORAL;  Wide Local Excision Of of Left Oral Cavity Ulcer;  Surgeon: Morro Mijares MD;  Location: [facility] OR     HC DRAIN SKIN ABSCESS SIMPLE/SINGLE  3/16/2012    Procedure:INCISION AND DRAINAGE, ABSCESS, SIMPLE; Surgeon:CHRISTIANO HANCOCK; Location: GI     HEAD & NECK SURGERY       HYSTERECTOMY       HYSTERECTOMY       INCISION AND DRAINAGE ABDOMEN WASHOUT, COMBINED       INJECT EPIDURAL LUMBAR Right 9/15/2020    Procedure: Lumbar5- sacral 1 epidural steroid injection with fluoroscopy;  Surgeon: Tram Florian MD;  [facility]     INJECT EPIDURAL LUMBAR Right 6/29/2021    Procedure: Lumbar 5 sacral 1 epidural steroid injection with fluoroscopy;  Surgeon: Tram Florian MD;  Location: [facility] OR     INJECT SACROILIAC JOINT Bilateral 6/16/2020    Procedure: Bilateral sacroiliac joint steroid injection with fluoroscopy;  Surgeon: Tram Florian MD;  [facility]     LAMINECTOMY THORACIC ONE LEVEL N/A 8/19/2019    Procedure: LAMINECTOMY, SPINE, THORACIC, 11-12 and Part of Lumbar 1, DRAINAGE OF EPIDURAL ABCESS, Epidural Drain Placement X 2;  Surgeon: Yadiel Beal MD;  Location: [facility] OR     MS PERCUT IMPLNT NEUROELECT,EPIDURAL N/A 8/8/2019    Procedure: TRIAL, SPINAL CORD STIMULATOR WITH BOSTON SCIENTIFIC;  Surgeon: Sipple, Daniel Peter, DO;  Location: [facility];  Service: Pain     RADIO FREQUENCY ABLATION / DESTRUCTION OF SACROILOAC JOINT DORSAL PRIMARY RAMUS Bilateral 12/17/2019    Procedure: Bilateral lumbar radiofrequency ablation with fluoroscopy and intravenous sedation ( Lumbar 2,3,4,5 medial branch nerves for the bilateral lumbar3-4, 4-5 and 5-sacral1 joints.;  Surgeon: Tram Florian MD;  [facility]     RADIO FREQUENCY ABLATION / DESTRUCTION OF SACROILOAC JOINT DORSAL PRIMARY RAMUS Right 11/17/2020    Procedure: Right lumbar medial branch nerve radiofrequency ablation right L2,3,4,5 nerves supplying the right L3-4, L4-5 and L5-S1 facet joints;  Surgeon: Tram Florian MD;   "Location: [facility] OR     spinal cord stimulator  08/08/2019     spinal cord stimulator removal  08/13/2019       OBJECTIVE FINDINGS   Patient Supplied Answers To VHI Questionnaire  Voice Handicap Index (VHI-10) 10/1/2021   My voice makes it difficult for people to hear me 3   People have difficulty understanding me in a noisy room 3   My voice difficulties restrict my personal and social life.  1   I feel left out of conversations because of my voice 2   My voice problem causes me to lose income 0   I feel as though I have to strain to produce voice 3   The clarity of my voice is unpredictable 4   My voice problem upsets me 4   My voice makes me feel handicapped 2   People ask, \"What's wrong with your voice?\" 2   VHI-10 24        Patient Supplied Answers To CSI Questionnaire  Cough Severity Index (CSI) 10/1/2021   My cough is worse when I lie down 2   My coughing problem causes me to restrict my personal and social life 1   I tend to avoid places because of my cough problem 2   I feel embarrassed because of my coughing problem 2   People ask, ''What's wrong?'' because I cough a lot 1   I run out of air when I cough 2   My coughing problem affects my voice 2   My coughing problem limits my physical activity 3   My coughing problem upsets me 2   People ask me if I am sick because I cough a lot 2   CSI Score 19        Patient Supplied Answers To EAT Questionnaire  Eating Assessment Tool (EAT-10) 10/1/2021   My swallowing problem has caused me to lose weight 1   My swallowing problem interferes with my ability to go out for meals 1   Swallowing liquids takes extra effort 2   Swallowing solids takes extra effort 2   Swallowing pills takes extra effort 1   Swallowing is painful 2   The pleasure of eating is affected by my swallowing 2   When I swallow food sticks in my throat 4   I cough when I eat 3   Swallowing is stressful 2   EAT-10 20       PERCEPTUAL EVALUATION (69454)  VOICE/ SPEECH/ NON-COMMUNICATIVE LARYNGEAL " BEHAVIORS EVALUATION    Self-guided Palpation of the laryngeal area shows:    tenderness of the thyrohyoid area    R>L     palpation did not induce cough    Breathing pattern:     appears within normal limits and adequate    Tension:     is not overtly evident    Cough/ Throat clear:    throat clear is primary and observed rarely during today's session     Zayra states today is a typical voice day, with clinician observing voice quality characterized by:    Roughness: Mild    Breathiness: Mild    Strain: Mild to moderate    Habitual pitch is 185 Hz and is WNL and appropriate, but sounds lower due to rough voice quality and backward focus    Pitch glide reveals range of 110 to 262 Hz    Loudness is WNL and is appropriate for the setting    Maximum Phonation Time: 7 seconds    GLOBAL ASSESSMENT OF DYSPHONIA: 40/100    IMAGE from Dr. Laurent's laryngoscopy on 9/29/21            _______________________________________________________________________  THERAPY NOTE (CPT 73935)  Date of Service: 10/1/2021    SUBJECTIVE / OBJECTIVE:  Please refer to my evaluation report from today's encounter for full details regarding subjective data, patient reported measures, and diagnostic findings.    THERAPEUTIC ACTIVITIES  Today Zayra participated in the following therapeutic activities:  Counseling and Education:    Asked questions about the nature of her symptoms, and I answered all of these thoroughly.    Therapy protocol and rationale, including plan for today's session and future sessions    Education of laryngeal anatomy and physiology    Information regarding the complex interactions between laryngeal function and self-perpetuating cycles of irritation and hyper-reactivity with application to her own reported symptoms and patterns of laryngeal impairment    Additional education regarding polypoid tissue and smoking related changes to the vocal fold mucosa    Exercises to coordinate phonation with optimal flowing airstream  "and reduce phonatory impact.     Semi-occluded vocal tract exercises with a straw and cup of 1-1.5\" water (\"straw and bubbles\") were most facilitating    She progressed through the basic bubble level of phonatory complexity, but was not yet able to produce voice with a lower phonation pressure    Patient demonstrated fair learning, but will need practice and additional reinforcement    Instruction of Home Practice:    I instructed Zayra in the concepts of an optimal practice regimen, including use of an interval schedule with brief periods of practice frequently throughout each day, and concepts of volitional practice to facilitate motor learning.    I emphasized the therapeutic rational and provided an emailed After Visit Summary to reinforce today's therapeutic activities and facilitate home practice.    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 Perceptually, Zayra demonstrates moderately rough and strained voice quality, perilaryngeal tension, and was noted to throat clear occasionally during evaluation. Laryngoscopy without stroboscopy was completed by MD on 9/29/2021 revealing diffusely edematous laryngeal tissue with irregular vocal fold edges, small white areas indicative of ongoing thrush, and polypoid changes. Based on today's evaluation, Zayra would benefit from a course of speech therapy to improve laryngeal efficiency, reduce laryngeal irritation, improve voice quality and balance of intrinsic/extrinsic laryngeal musculature.     RECOMMENDATIONS:    Medically necessary speech therapy is warranted to improve voice quality and promote reduced discomfort, effort and fatigue.    Referral for swallow assessment, given patient's reports of frequent choking and food/liquids sticking in her throat.    She demonstrates a Good prognosis " for improvement given adherence to therapeutic recommendations.    Positive indicators: high level of comittment    Negative indicators: None/Unremarkable  DURATION/FREQUENCY: Five bi-weekly, one-hour sessions    Goals:  Patient goal:    To understand the problem and fix it as much as possible    Short-term goal(s): Within the first 4 sessions, Zayra will:  -- utilize vocal hygiene strategies in order to minimize laryngeal irritation and facilitate improved therapeutic outcomes with 90% accy.  -- demonstrate provided cough and throat clear suppression/substitution strategies from memory independently with 90% accuracy.  -- demonstrate silent inhalation and abdominal breathing pattern in order to optimize breathing mechanics with 90% accy and min cues.  -- demonstrate estela-laryngeal release and laryngeal massage techniques with >80% accy  -- coordinate appropriate air flow levels with forward resonance during phonation in order to minimize laryngeal compensation and effort with 90% accy.    Long-term goal(s): In 3 months, Zayra will:  -- report a 90% resolution of voice symptoms during a week of performing typical personal, social, and professional activities.    This treatment plan was developed with the patient who agreed with the recommendations.    PLAN: I will see Zayra in 2 weeks, at which time we will attempt to add voicing to straw bubbles, maintaining abdominal pattern, and instruct LPR/PND.     ICD-10 codes:  Laryngeal Hyperfunction (J38.7) and Dysphonia (R49.0)    TOTAL SERVICE TIME: 70 minutes  EVALUATION OF VOICE AND RESONANCE (67451)  TREATMENT (27819)  NO CHARGE FACILITY FEE (63326)    Chon Rendon (voice) M.S., CCC-SLP  Speech-Language Pathologist  [facility]  [phone]  [email or web address]  Pronouns: she/her/hers      *this report was created in part through the use of computerized dictation software, and though reviewed following completion, some typographic errors  may persist.  If there is confusion regarding any of this notes contents, please contact me for clarification      Again, thank you for allowing me to participate in the care of your patient.      Sincerely,    Shara Samuel, SLP

## 2021-10-01 NOTE — TELEPHONE ENCOUNTER
Unable to reach the pt as her phone system was directed to [facility].        Yvan  ------------------------------------------------------------------------        ----- Message from Elizabeth Lowery sent at 9/30/2021  9:52 AM CDT -----  Lorna Santoro,    I spoke with Zayra and she is no longer dealing with the symptoms. She would like to speak with Neena Tam APRN CNP about this if she is able.     I also do not see that she has an active order for a hydrogen breath test.     Can you please let me know if there is anything else we can do for the pt?    Thank you,  Elizabeth Lowery  Endo Scheduling Team     ----- Message -----  From: Yvan Black RN  Sent: 9/24/2021   2:33 PM CDT  To: Endoscopy Scheduling Pool    Please schedule for hydrogen breath test and call the pt. Thanks.      Yvan Black RN Care Coordinator  Primary Care Clinic  Phone [phone]  Fax     [phone]

## 2021-10-01 NOTE — TELEPHONE ENCOUNTER
Zayra would like to receive an update from the care team regarding her MRI results from [facility]. Zayra is hoping for an update to work on getting some additional injections. Please give her a call back at your earliest convenience.    Thank you,  Cassy

## 2021-10-04 NOTE — TELEPHONE ENCOUNTER
RN called patient and left voicemail to return call to pain clinic to discuss request of MRI results.     Cherelle Smart RN
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
## 2021-10-07 NOTE — TELEPHONE ENCOUNTER
Zayra called again to request a call back regarding her MRI results. Zayra would very much like to hear back.    Thank you,  Cassy

## 2021-10-07 NOTE — TELEPHONE ENCOUNTER
RN returned call to patient. Writer discussed that the pain clinic received patient's MRI results and will update the provider. Provider will review and we will update patient with plan/recommendations. Patient verbalized understanding.     Cherelle Smart RN

## 2021-10-12 ENCOUNTER — LAB (OUTPATIENT)
Dept: LAB | Facility: CLINIC | Age: 51
End: 2021-10-12
Payer: COMMERCIAL

## 2021-10-12 DIAGNOSIS — Z11.59 ENCOUNTER FOR SCREENING FOR OTHER VIRAL DISEASES: ICD-10-CM

## 2021-10-12 PROCEDURE — U0003 INFECTIOUS AGENT DETECTION BY NUCLEIC ACID (DNA OR RNA); SEVERE ACUTE RESPIRATORY SYNDROME CORONAVIRUS 2 (SARS-COV-2) (CORONAVIRUS DISEASE [COVID-19]), AMPLIFIED PROBE TECHNIQUE, MAKING USE OF HIGH THROUGHPUT TECHNOLOGIES AS DESCRIBED BY CMS-2020-01-R: HCPCS

## 2021-10-12 PROCEDURE — U0005 INFEC AGEN DETEC AMPLI PROBE: HCPCS

## 2021-10-13 LAB — SARS-COV-2 RNA RESP QL NAA+PROBE: NEGATIVE

## 2021-10-14 ENCOUNTER — HOSPITAL ENCOUNTER (OUTPATIENT)
Facility: AMBULATORY SURGERY CENTER | Age: 51
Discharge: HOME | End: 2021-10-14
Attending: ANESTHESIOLOGY | Admitting: ANESTHESIOLOGY
Payer: COMMERCIAL

## 2021-10-14 ENCOUNTER — ANCILLARY PROCEDURE (OUTPATIENT)
Dept: RADIOLOGY | Facility: AMBULATORY SURGERY CENTER | Age: 51
End: 2021-10-14
Attending: ANESTHESIOLOGY
Payer: COMMERCIAL

## 2021-10-14 VITALS
BODY MASS INDEX: 37.9 KG/M2 | WEIGHT: 222 LBS | TEMPERATURE: 98.4 F | DIASTOLIC BLOOD PRESSURE: 79 MMHG | HEIGHT: 64 IN | HEART RATE: 73 BPM | OXYGEN SATURATION: 98 % | RESPIRATION RATE: 16 BRPM | SYSTOLIC BLOOD PRESSURE: 116 MMHG

## 2021-10-14 DIAGNOSIS — M54.12 CERVICAL RADICULOPATHY: ICD-10-CM

## 2021-10-14 DIAGNOSIS — R52 PAIN: ICD-10-CM

## 2021-10-14 PROCEDURE — 62321 NJX INTERLAMINAR CRV/THRC: CPT | Performed by: ANESTHESIOLOGY

## 2021-10-14 PROCEDURE — 62321 NJX INTERLAMINAR CRV/THRC: CPT

## 2021-10-14 RX ORDER — METHYLPREDNISOLONE ACETATE 40 MG/ML
INJECTION, SUSPENSION INTRA-ARTICULAR; INTRALESIONAL; INTRAMUSCULAR; SOFT TISSUE PRN
Status: DISCONTINUED | OUTPATIENT
Start: 2021-10-14 | End: 2021-10-14 | Stop reason: HOSPADM

## 2021-10-14 RX ORDER — BUPIVACAINE HYDROCHLORIDE 2.5 MG/ML
INJECTION, SOLUTION INFILTRATION; PERINEURAL PRN
Status: DISCONTINUED | OUTPATIENT
Start: 2021-10-14 | End: 2021-10-14 | Stop reason: HOSPADM

## 2021-10-14 RX ORDER — IOPAMIDOL 408 MG/ML
INJECTION, SOLUTION INTRATHECAL PRN
Status: DISCONTINUED | OUTPATIENT
Start: 2021-10-14 | End: 2021-10-14 | Stop reason: HOSPADM

## 2021-10-14 RX ORDER — LIDOCAINE HYDROCHLORIDE 10 MG/ML
INJECTION, SOLUTION EPIDURAL; INFILTRATION; INTRACAUDAL; PERINEURAL PRN
Status: DISCONTINUED | OUTPATIENT
Start: 2021-10-14 | End: 2021-10-14 | Stop reason: HOSPADM

## 2021-10-14 ASSESSMENT — MIFFLIN-ST. JEOR: SCORE: 1611.99

## 2021-10-14 NOTE — OP NOTE
PROCEDURE NOTE:    PROCEDURE:  Cervical epidural steroid injection via the posterior interlaminar approach at the following spinal level: C7-T1.   The procedure was performed with fluoroscopic guidance    PRE-OPERATIVE DIAGNOSIS:  1. Cervical disc disorder at C7-T1 level w radiculopathy  2. Other cervical disc degeneration, cervicothoracic region (C7-T1)    POST- OPERATIVE DIAGNOSIS:  1. Cervical disc disorder at C7-T1 level w radiculopathy  2. Other cervical disc degeneration, cervicothoracic region (C7-T1)    PATIENT IDENTIFICATION:  The patient presents to the clinic today for the treatment of persistent pain. We believe that the pain is spinally mediated. The patient has been exhibiting symptoms consistent with cervical intraspinal inflammation. Symptoms have been persistent, disabling and intermittently severe. The patient has been evaluated in the pain clinic and scheduled today for a spinal injection to aid in the diagnosis and treatment of presumed spinal pain. The patient has failed all prior conservative care. The patient is being treated today for cervical radicular pain.    INFORMED CONSENT:  The risks, benefits, potential complications, and alternatives were discussed with the patient as per the signed consent form, and informed consent was obtained. The patient has received information about the procedure and its risks. The physician personally confirmed the procedure, side, and site to be injected with the patient in the pre-procedure area.    CONSCIOUS SEDATION/MONITORING:  The patient did not receive IV or inhalational analgesia for the procedure. The ECG, automatic B/P cuff, and pulse oximeter monitors were used during the procedure. Verbal contact was maintained throughout the procedure.    ANESTHESIA:  Local Anesthesia    PROCEDURE DESCRIPTION:  After informed consent, the patient was brought to the procedure room and placed onto the x-ray table in the prone position with neck flexion posture.  "The cervical region was prepped and draped in the usual sterile fashion. I used AP fluoroscopy to visualize the cervical spine. The fluoroscopy time for this procedure is documented in nursing notes. I identified the C7-T1 level. The local anesthetic lidocaine 1% was used to raise a skin wheal over the midline spine. A 22g, 3.5\" Tuohy epidural needle was passed through the skin wheal. I then advanced the needle into the epidural space using AP and lateral fluoroscopy and loss of resistance technique. Loss of resistance was obtained. I injected Isovue 200 mg/mL and obtained an epidurogram. I injected a solution containing 40 mg methylprednisolone 10 mg/mL mixed with 2mL bupivacaine 0.25%. The needle was removed after flushing with 1% lidocaine. A sterile bandage was applied. The patient did not experience any hemodynamic or neurologic sequelae. The patient was transferred to the recovery area. Post operative instructions were explained and given to the patient.      COMPLICATIONS:  Upon completion of the procedure, the patient was moved to the recovery room for observation. No immediate complications occurred.        "

## 2021-10-14 NOTE — H&P
Zayra Ramirez  [id number]  female  51 year old      Reason for procedure/surgery: cervical epidural     Patient Active Problem List   Diagnosis     Chronic midline low back pain     Facial cellulitis     Morbid (severe) obesity due to excess calories (H)     Dental abscess     Hypoxia     Subcutaneous emphysema (H)     Borderline personality disorder (H)     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)     Esophageal stricture     GERD (gastroesophageal reflux disease)     HTN (hypertension)     Interstitial lung disease (H)     Moderate persistent asthma without complication     Onychomycosis     Restless leg syndrome     Seasonal allergies     Smoker     Stress     Respiratory bronchiolitis interstitial lung disease (H)     Spinal epidural abscess     Lumbar spondylosis     Inflammatory arthritis     Arthralgia of temporomandibular joint     Articular disc disorder of temporomandibular joint     Derangement of temporomandibular joint     Calculus of gallbladder without cholecystitis without obstruction     Displacement of articular disc of temporomandibular joint with reduction     Myofascial pain     Oral lesion     Symptomatic cholelithiasis     Sacro-iliac pain     Degenerative lumbar spinal stenosis     Chronic lumbar radiculopathy     Glucose intolerance     Chronic neck pain     Lumbar radiculopathy, chronic     Cervical radiculopathy       Past Surgical History:    Past Surgical History:   Procedure Laterality Date     CERVICAL FUSION       CHOLECYSTECTOMY       COLONOSCOPY       COLONOSCOPY N/A 2/6/2020    Procedure: COLONOSCOPY, WITH POLYPECTOMY AND BIOPSY;  Surgeon: Julian Mccullough MD;  Location:  GI     ENT SURGERY       ESOPHAGOSCOPY, GASTROSCOPY, DUODENOSCOPY (EGD), COMBINED N/A 2/6/2020    Procedure: ESOPHAGOGASTRODUODENOSCOPY (EGD);  Surgeon: Julian Mccullough MD;  Location:  GI     EXCISE LESION INTRAORAL Bilateral 10/3/2018    Procedure: EXCISE LESION INTRAORAL;  Wide Local  Excision Of of Left Oral Cavity Ulcer;  Surgeon: Morro Mijares MD;  Location: [facility] OR     HC DRAIN SKIN ABSCESS SIMPLE/SINGLE  3/16/2012    Procedure:INCISION AND DRAINAGE, ABSCESS, SIMPLE; Surgeon:CHRISTIANO HANCOCK; Location: GI     HEAD & NECK SURGERY       HYSTERECTOMY       HYSTERECTOMY       INCISION AND DRAINAGE ABDOMEN WASHOUT, COMBINED       INJECT EPIDURAL LUMBAR Right 9/15/2020    Procedure: Lumbar5- sacral 1 epidural steroid injection with fluoroscopy;  Surgeon: Tram Florian MD;  [facility]     INJECT EPIDURAL LUMBAR Right 6/29/2021    Procedure: Lumbar 5 sacral 1 epidural steroid injection with fluoroscopy;  Surgeon: Tram Florian MD;  Location: [facility]     INJECT SACROILIAC JOINT Bilateral 6/16/2020    Procedure: Bilateral sacroiliac joint steroid injection with fluoroscopy;  Surgeon: Tram Florian MD;  [facility]     LAMINECTOMY THORACIC ONE LEVEL N/A 8/19/2019    Procedure: LAMINECTOMY, SPINE, THORACIC, 11-12 and Part of Lumbar 1, DRAINAGE OF EPIDURAL ABCESS, Epidural Drain Placement X 2;  Surgeon: Yadiel Beal MD;  Location: [facility] OR     OK PERCUT IMPLNT NEUROELECT,EPIDURAL N/A 8/8/2019    Procedure: TRIAL, SPINAL CORD STIMULATOR WITH BOSTON SCIENTIFIC;  Surgeon: Sipple, Daniel Peter, DO;  Location: [facility];  Service: Pain     RADIO FREQUENCY ABLATION / DESTRUCTION OF SACROILOAC JOINT DORSAL PRIMARY RAMUS Bilateral 12/17/2019    Procedure: Bilateral lumbar radiofrequency ablation with fluoroscopy and intravenous sedation ( Lumbar 2,3,4,5 medial branch nerves for the bilateral lumbar3-4, 4-5 and 5-sacral1 joints.;  Surgeon: Tram Florian MD;  [facility]     RADIO FREQUENCY ABLATION / DESTRUCTION OF SACROILOAC JOINT DORSAL PRIMARY RAMUS Right 11/17/2020    Procedure: Right lumbar medial branch nerve radiofrequency ablation right L2,3,4,5 nerves supplying the right L3-4, L4-5 and L5-S1 facet joints;  Surgeon: Tram Florian MD;  Location: [facility] OR      spinal cord stimulator  08/08/2019     spinal cord stimulator removal  08/13/2019       Past Medical History:   Past Medical History:   Diagnosis Date     Allergic rhinitis      Anemia      Arthritis      Asthma     copd     Dental abscess 8-2015     Depressive disorder      Gastroesophageal reflux disease      History of emphysema      Hoarseness      Hypertension      Morbid obesity with BMI of 40.0-44.9, adult (H)      Obstructive sleep apnea      Other chronic pain      Respiratory bronchiolitis interstitial lung disease (H)      Sleep apnea        Social History:   Social History     Tobacco Use     Smoking status: Current Every Day Smoker     Packs/day: 0.50     Years: 30.00     Pack years: 15.00     Types: Cigarettes     Start date: 1/1/1996     Smokeless tobacco: Never Used     Tobacco comment: has tried the patch   Substance Use Topics     Alcohol use: No       Family History:   Family History   Problem Relation Age of Onset     Other Cancer Father         base of tongue cancer at age ~65     Hypertension Father      Back Pain Father      Restless Leg Syndrome Father      Asthma Mother      Restless Leg Syndrome Mother      Restless Leg Syndrome Sister      Breast Cancer Maternal Aunt 55       Allergies:   Allergies   Allergen Reactions     Bee Venom Anaphylaxis     Bees      Doxycycline Anaphylaxis     Patient thinks it may have been just nausea and vomiting, however unable to confirm     Erythromycin Anaphylaxis and Shortness Of Breath     Other reaction(s): Vomiting     Hydrocodone-Acetaminophen Itching       Active Medications:   Current Outpatient Medications   Medication Sig Dispense Refill     albuterol (PROAIR HFA, PROVENTIL HFA, VENTOLIN HFA) 108 (90 BASE) MCG/ACT inhaler Inhale 2 puffs into the lungs every 6 hours as needed for shortness of breath / dyspnea or wheezing (PT last dose 1.23.2020)        ARIPiprazole (ABILIFY) 15 MG tablet Take 15 mg by mouth At Bedtime        busPIRone HCl (BUSPAR)  30 MG tablet Take 15 mg by mouth At Bedtime       carboxymethylcellulose (CARBOXYMETHYLCELLULOSE SODIUM) 0.5 % SOLN ophthalmic solution Place 1 drop into both eyes 3 times daily as needed for dry eyes 15 mL 11     celecoxib (CELEBREX) 200 MG capsule Take 1 capsule (200 mg) by mouth every morning 60 capsule 4     cetirizine (ZYRTEC) 10 MG tablet Take 1 tablet (10 mg) by mouth daily 69 tablet 3     cholecalciferol ( ULTRA STRENGTH) 2000 units CAPS TAKE ONE CAPSULE BY MOUTH DAILY IN AM       clotrimazole (MYCELEX) 10 MG lozenge Allow 1 nakul to dissolve slowly in mouth 3 times daily for 14 days 42 lozenge 0     cyclobenzaprine (FLEXERIL) 10 MG tablet Take 1 tablet (10 mg) by mouth daily 90 tablet 1     DULoxetine (CYMBALTA) 60 MG capsule Take 120 mg by mouth At Bedtime        EPINEPHrine (EPIPEN/ADRENACLICK/OR ANY BX GENERIC EQUIV) 0.3 MG/0.3ML injection 2-pack INJECT 0.3ML INTO THE MUSCLE ONCE AS NEEDED FOR ANAPHYLAXIS       ferrous sulfate (FEROSUL) 325 (65 Fe) MG tablet Take 325 mg by mouth daily (with breakfast)   0     folic acid (FOLVITE) 1 MG tablet Take 1 tablet (1 mg) by mouth daily 90 tablet 3     lisinopril-hydrochlorothiazide (ZESTORETIC) 20-25 MG tablet Take 1 tablet by mouth daily 90 tablet 3     montelukast (SINGULAIR) 10 MG tablet Take 10 mg by mouth At Bedtime       omeprazole (PRILOSEC) 40 MG DR capsule Take 1 capsule (40 mg) by mouth daily 180 capsule 3     pregabalin (LYRICA) 150 MG capsule Take 1 capsule (150 mg) by mouth 2 times daily 60 capsule 3     rOPINIRole (REQUIP) 0.5 MG tablet Take 1 tablet (0.5 mg) by mouth At Bedtime Take 1 tab by mouth at bedtime. 90 tablet 1     traMADol (ULTRAM) 50 MG tablet Take 1 tablet (50 mg) by mouth nightly as needed for severe pain 30 tablet 0     vitamin C 500 MG TABS Take 500 mg by mouth daily (with lunch)        zinc sulfate (ZINCATE) 220 (50 Zn) MG capsule Take 220 mg by mouth daily (with lunch)        dexamethasone AF (DECADRON) 0.1 MG/ML solution  "Swish and spit 10 mLs (1 mg) in mouth 2 times daily 200 mL 0     fluconazole (DIFLUCAN) 100 MG tablet Take 2 tabs on first day. Then 100 mg daily for 6 days. 8 tablet 0     methotrexate sodium 2.5 MG TABS TAKE 9 TABLETS BY MOUTH ONCE WEEKLY  Labs  past due. appt past due. 108 tablet 5     nystatin (MYCOSTATIN) 891413 UNIT/GM external cream Apply topically 2 times daily 30 g 3     OXYGEN-HELIUM IN 2-3L PRN during the day, 3L @ night       Respiratory Therapy Supplies ([facility]) MISC          Systemic Review:   CONSTITUTIONAL: NEGATIVE for fever, chills, change in weight  ENT/MOUTH: NEGATIVE for ear, mouth and throat problems  RESP: NEGATIVE for significant cough or SOB  CV: NEGATIVE for chest pain, palpitations or peripheral edema    Physical Examination:   Vital Signs: /88   Pulse 70   Temp 98.1  F (36.7  C) (Temporal)   Resp 16   Ht 1.626 m (5' 4\")   Wt 100.7 kg (222 lb)   LMP  (LMP Unknown)   SpO2 96%   BMI 38.11 kg/m    GENERAL: healthy, alert and no distress  NECK: no adenopathy, no asymmetry, masses, or scars  RESP: lungs clear to auscultation - no rales, rhonchi or wheezes  CV: regular rate and rhythm, normal S1 S2, no S3 or S4, no murmur, click or rub, no peripheral edema and peripheral pulses strong  ABDOMEN: soft, nontender, no hepatosplenomegaly, no masses and bowel sounds normal  MS: no gross musculoskeletal defects noted, no edema    Plan: Appropriate to proceed as scheduled.      Tram Florian MD  10/14/2021          "

## 2021-10-14 NOTE — DISCHARGE INSTRUCTIONS
"Home Care Instructions after an Epidural Steroid Pain Injection    A lumbar epidural steroid injection delivers steroid medication directly into the area that may be causing your lower back pain and/or leg pain. A cervical or thoracic epidural steroid injection delivers steroids into the epidural space surrounding spinal nerve roots to help relieve pain in the upper spine/neck.    Activity  -Rest today  -Do not work today  -Resume normal activity tomorrow  -DO NOT shower for 24 hours  -DO NOT remove bandaid for 24 hours    Pain  -You may experience soreness at the injection site for one or two days  -You may use an ice pack for 20 minutes every 2 hours for the first 24 hours  -You may use a heating pad after the first 24 hours  -You may use Tylenol (acetaminophen) every 4 hours or other pain medicines as     directed by your physician    You may experience numbness radiating into your legs or arms (depending on the procedure location). This numbness may last several hours. Until sensation returns to normal; please use caution in walking, climbing stairs, and stepping out of your vehicle, etc.    DID YOU RECEIVE SEDATION TODAY?  {YES / NO:780949::\"Yes\"}    Safety  Sedation medicine, if given, may remain active for many hours. It is important for the next 24 hours that you do not:  -Drive a car  -Operate machines or power tools  -Consume alcohol, including beer  -Sign any important papers or legal documents      Common side effects of steroids:  Not everyone will experience corticosteroid side effects. If side effects are experienced, they will gradually subside in the 7-10 day period following an injection. Most common side effects include:  -Flushed face and/or chest  -Feeling of warmth, particularly in the face but could be an overall feeling of warmth  -Increased blood sugar in diabetic patients  -Menstrual irregularities my occur. If taking hormone-based birth control an alternate method of birth control is " recommended  -Sleep disturbances and/or mood swings are possible  -Leg cramps    Please contact us if you have:  -Severe pain  -Fever more than 101.5 degrees Fahrenheit  -Signs of infection at the injection site (redness, swelling, or drainage)    If you have questions, please contact our office at [phone] between the hours of 7:00 am and 3:00 pm Monday through Friday. After office hours you can contact the on call provider by dialing [phone]. If you need immediate attention, we recommend that you go to a hospital emergency room or dial [phone].

## 2021-10-18 DIAGNOSIS — G25.81 RESTLESS LEG SYNDROME: ICD-10-CM

## 2021-10-18 RX ORDER — ROPINIROLE 0.5 MG/1
0.5 TABLET, FILM COATED ORAL AT BEDTIME
Qty: 90 TABLET | Refills: 1 | Status: SHIPPED | OUTPATIENT
Start: 2021-10-18 | End: 2022-04-04

## 2021-10-18 NOTE — TELEPHONE ENCOUNTER
rOPINIRole (REQUIP) 0.5 MG tablet  Last Written Prescription Date:  4/19/2021  Last Fill Quantity: 90,   # refills: 1  Last Office Visit: 1/5/2021  Future Office visit:    Next 5 appointments (look out 90 days)    Dec 17, 2021 12:00 PM  (Arrive by 11:45 AM)  Return Visit with Panchito Saenz MD  [facility] [place] ([facility] ) [address] [place] [id number] [phone]           Routing refill request to provider for review/approval because:  Drug not on the G, P or [facility] refill protocol or controlled substance

## 2021-10-19 ENCOUNTER — VIRTUAL VISIT (OUTPATIENT)
Dept: PALLIATIVE MEDICINE | Facility: CLINIC | Age: 51
End: 2021-10-19
Attending: ANESTHESIOLOGY
Payer: COMMERCIAL

## 2021-10-19 DIAGNOSIS — M79.2 NEUROPATHIC PAIN: Primary | ICD-10-CM

## 2021-10-19 DIAGNOSIS — M54.16 LUMBAR RADICULOPATHY: ICD-10-CM

## 2021-10-19 DIAGNOSIS — M54.16 LUMBAR RADICULOPATHY: Primary | ICD-10-CM

## 2021-10-19 PROCEDURE — 96156 HLTH BHV ASSMT/REASSESSMENT: CPT | Mod: 95 | Performed by: PSYCHOLOGIST

## 2021-10-21 DIAGNOSIS — G99.2 STENOSIS OF CERVICAL SPINE WITH MYELOPATHY (H): Primary | ICD-10-CM

## 2021-10-21 DIAGNOSIS — M48.02 STENOSIS OF CERVICAL SPINE WITH MYELOPATHY (H): Primary | ICD-10-CM

## 2021-10-24 ENCOUNTER — HEALTH MAINTENANCE LETTER (OUTPATIENT)
Age: 51
End: 2021-10-24

## 2021-10-26 ENCOUNTER — VIRTUAL VISIT (OUTPATIENT)
Dept: OTOLARYNGOLOGY | Facility: CLINIC | Age: 51
End: 2021-10-26
Payer: COMMERCIAL

## 2021-10-26 VITALS — WEIGHT: 222 LBS | HEIGHT: 64 IN | BODY MASS INDEX: 37.9 KG/M2

## 2021-10-26 DIAGNOSIS — J38.1 VOCAL CORD POLYPS: Primary | ICD-10-CM

## 2021-10-26 PROCEDURE — 99442 PR PHYSICIAN TELEPHONE EVALUATION 11-20 MIN: CPT | Mod: 95 | Performed by: OTOLARYNGOLOGY

## 2021-10-26 ASSESSMENT — MIFFLIN-ST. JEOR: SCORE: 1611.99

## 2021-10-26 NOTE — LETTER
10/26/2021       RE: Zayra Ramirez  [address] [place] [address]     Dear Colleague,    Thank you for referring your patient, Zayra Ramirez, to the [facility] [place] [facility]. Please see a copy of my visit note below.    Ms. Ramirez is 51 years of age.  She is here for a phone visit.  She had some relief from the current medicines that she is on.  She has some other issues going on besides the aphthous ulcers.  She is seen for dysphonia as well, so she has that going on additionally She has no other new complaints at the present time today.  We decided that she is somewhat improved from the current medication regimen and we can see how she is doing in approximately three months or thereabouts.  She can continue on in the meantime for her dysphonia. 5 minute chart review 15 minute phone visit           Again, thank you for allowing me to participate in the care of your patient.      Sincerely,    Morro Mijares MD

## 2021-10-26 NOTE — PROGRESS NOTES
Ms. Ramirez is 51 years of age.  She is here for a phone visit.  She had some relief from the current medicines that she is on.  She has some other issues going on besides the aphthous ulcers.  She is seen for dysphonia as well, so she has that going on additionally She has no other new complaints at the present time today.  We decided that she is somewhat improved from the current medication regimen and we can see how she is doing in approximately three months or thereabouts.  She can continue on in the meantime for her dysphonia. 5 minute chart review 15 minute phone visit

## 2021-10-26 NOTE — PATIENT INSTRUCTIONS
"1. You were seen in the clinic today by Dr. Mijares. If you have any questions or concerns after your appointment, please call the clinic at [phone]. Press \"1\" for scheduling, press \"3\" for nurse advice.    2.   Plan to return the clinic in 3 weeks.       Marley Gary LPN  [facility]  Department of Otolaryngology  [phone]    "

## 2021-11-01 ENCOUNTER — VIRTUAL VISIT (OUTPATIENT)
Dept: OTOLARYNGOLOGY | Facility: CLINIC | Age: 51
End: 2021-11-01
Payer: COMMERCIAL

## 2021-11-01 DIAGNOSIS — R49.0 DYSPHONIA: ICD-10-CM

## 2021-11-01 DIAGNOSIS — B37.89 LARYNGEAL CANDIDIASIS: ICD-10-CM

## 2021-11-01 DIAGNOSIS — J38.7 LARYNGEAL HYPERFUNCTION: ICD-10-CM

## 2021-11-01 DIAGNOSIS — J38.1 VOCAL CORD POLYPS: Primary | ICD-10-CM

## 2021-11-01 PROCEDURE — 92507 TX SP LANG VOICE COMM INDIV: CPT | Mod: GN | Performed by: SPEECH-LANGUAGE PATHOLOGIST

## 2021-11-01 NOTE — PROGRESS NOTES
Zayra Ramirez is a 51 year old female who is being evaluated via a billable video visit.      Zayra has been notified and verbally consented to the following:     This video visit will be conducted between you and your provider.    Patient has opted to conduct today's video visit vs an in-person appointment.     Video visits are billed at different rates depending on your insurance coverage. Please reach out to your insurance provider with any questions.     If during the course of the call the provider feels the appointment is not appropriate, you will not be charged for this service.  Provider has received verbal consent for billable virtual visit from the patient? Yes  Will anyone else be joining your video visit? No    Call initiated at: 1400   Type of Visit Platform Used: [employer]  Location of provider: Home  Location of patient: [facility]  PROGRESS REPORT (CPT 58419)    Patient: Zayra Ramirez  Date of Service: 11/1/2021  Date of Last Service: 10/1/2021  Number of Visits: 2  Referring Physician: Dr. Mijares  Impressions from evaluation on 10/1/2021 by Chon Rendon (voice), M.SAmelia, CCC-SLP:  Zayra Ramirez is presenting today with Dysphonia (R49.0) in the context of Laryngeal Hyperfunction (J38.7) and Polypoid tissue changes secondary to smoking and recent thrush infection. Perceptually, Zayra demonstrates moderately rough and strained voice quality, perilaryngeal tension, and was noted to throat clear occasionally during evaluation. Laryngoscopy without stroboscopy was completed by MD on 9/29/2021 revealing diffusely edematous laryngeal tissue with irregular vocal fold edges, small white areas indicative of ongoing thrush, and polypoid changes. Based on today's evaluation, Zayra would benefit from a course of speech therapy to improve laryngeal efficiency, reduce laryngeal irritation, improve voice quality and balance of intrinsic/extrinsic laryngeal musculature.      SUBJECTIVE:  Since Zayra's last session, she reports the following:   o Overall symptoms are improving and she's been practicing her bubble exercises.   o She's starting to be able to add her voice to the bubble tasks.   o Her throat is still dry feeling. She continues working on smoking cessation.     OBJECTIVE:  Patient Specific Goal Metrics:  Dysponia SLP Goals 10/1/2021   How would you rate your speaking voice quality, if 0 is worst voice quality, and 10 is best voice? 5   How much effort is it to speak, if 0 is no extra effort and 10 is maximum effort? 7   How much does your voice problem bother you? Quite a bit       THERAPEUTIC ACTIVITIES  Today Zayra participated in the following therapeutic activities:  Counseling and Education:    Asked questions about the nature of her symptoms, and I answered all of these thoroughly.    I provided Zayra with explanation and skilled instruction of:    Concepts and strategies for managing laryngopharyngeal reflux disorder, to reduce laryngeal inflammation. This included education regarding the impact of reflux on the laryngeal system and management tasks including diet modification, head of bed elevation, avoidance of oral intake prior to laying down, smoking cessation, and stress management.    Concepts and techniques for using saline and plain-water gargling, nasal irrigation, gel saline spray, humidification, increased liquid intake, and steam to increase systemic and typical hydration and reduce post-nasal drainage and laryngeal irritation.     Instruction of Home Practice:    A revised regimen for home practice was instructed.    I provided an AVS of important notes of today's therapeutic activities to facilitate practice.    ASSESSMENT/PLAN  Progress toward long-term goals:  Adequate but incomplete progress; please see above    Impressions:  IMPRESSIONS: Dysphonia (R49.0) in the context of Laryngeal Hyperfunction (J38.7) and Polypoid tissue changes  secondary to smoking and recent thrush infection      Zayra had a productive session of therapy today, working on reflux precautions and mucus management.  She will continue to work on her exercises on a daily basis, and work on incorporating the techniques into her daily activities. Speech therapy continues to be medically necessary for Zayra to participate fully and engage in activities of daily living.     Plan:   I will see Zayra in 2 weeks and will plan to review LPR and PND, work on adding voice to cup bubbles.   For practice goals, see AVS.     TOTAL SERVICE TIME: 50 minutes  TREATMENT (54516)  NO CHARGE FACILITY FEE    Chon Rendon (voice), M.S., CCC-SLP  Speech-Language Pathologist  [facility]  [phone]  [email or web address]  Pronouns: she/her/hers      *this report was created in part through the use of computerized dictation software, and though reviewed following completion, some typographic errors may persist.  If there is confusion regarding any of this notes contents, please contact me for clarification

## 2021-11-01 NOTE — PATIENT INSTRUCTIONS
Hello!     It was a pleasure to see you today. Please complete the exercises below and remember, a few minutes of practice many times throughout the day is more important than one large practice session. If you have any questions about your strategies, feel free to contact me at [email or web address] or via Infectious. Please call [phone] for scheduling alterations.      Please know that this email will be automatically deleted from the  after 30 days, so please COPY AND SAVE your exercises and handouts to your own computer.      Home Exercise Program:  REFLUX MANAGEMENT (ongoing as needed)  -- When reflux comes up the wrong way from your stomach and spills into your throat, you may not feel it come up if the acidity levels are low. However, liquid spilling onto the vocal cords can cause lots of throat irritation, cough and throat clearing, hoarseness, and even difficulty breathing.   1. Elevate the head of your bed about 4 inches, trying to keep the mattress straight to avoid any back or neck aches.   2. Avoid eating/drinking close to when you lay down, about 2-3 hours before you lay down. Try to avoid eating or drinking anything more than what would fit in the size of your fist. The less there is in your stomach, the less can spill back up during the night.   3. Try to eat foods with a high PH level (less acidic). Possible reflux triggers include spicy foods, acidic foods, and foods that may relax the esophageal sphincters and allow back-splash, such as raw onions, garlic, or peppers, store-bought tomato sauces/salsas, pizza, greasy foods, orange or lemon juice, chocolate, caffeine, carbonation, alcohol, mints and menthol. Try to avoid these foods for about a month so your throat has time to heal and calm down, then reintroduce 1 food every 3-4 days, using a notebook to track any increase in symptoms to figure out which foods are your trigger foods. Try to cut back to 1 Mountain Dew per day for now.  Switch to sparkling water instead of Mountain Dew- try lime.  4. Avoid putting pressure on your stomach by slouching, wearing tight clothes, or carrying extra weight that pushes on your stomach.   5. Actively reduce stress. Try to find 5-10 minutes per day for mindfulness meditation. Stress can cause a fight or flight response in your body, even if you feel like you're able to handle stress. Try deep breathing, yoga, or mindfulness meditation to help calm your nervous system (Calm, Headspace, Yoga with Brittni on youtube, etc). Square Breathing is an easy to use stress management tool: Inhale for 4 seconds, hold your breath for 4 seconds, exhale for 4 seconds, and hold your breath for 4 seconds. Repeat at least 4 times.   6. You could consider sipping, gargling, and swallowing about 6 oz of alkaline water in the evening, which helps neutralize any acidity coming up to your throat. Keep in mind that alkaline water looses it's alkaline properties the longer it has been bottled, so check the bottling dates or find a store like [employer] where you can fill your own container of freshly alkalized water.   7. Join a smoking cessation program, if applicable. Smoking not only directly damages the vocal cords, it also opens the larson on your esophagus and makes stomach acid more likely to spill back up into the throat.  -- If you're interested, Philip Livingston (ENT) wrote the Acid Watcher Diet.      MUCUS MANAGEMENT (ongoing as needed)  -- Mucus is your body's way of cleaning out particles of dust, dirt, and allergens that you've breathed in. These particles get stuck in the mucus and it slowly drips out of your sinuses and down the back of your throat, where it is typically swallowed and removed from your body. Mucus is also produced to help protect your sinuses and nasal passages from excessive dryness in the winter, preventing those tissues from drying and cracking (nosebleeds). If your body is dehydrated, even normal  "mucus amounts can thicken and get stickier, causing more coughing and throat clearing.  1. Sinus rinse 1/day to 2-3x/week to start, then eventually cut back to only as needed (NeilMed Sinus Rinse bottle with warmed distilled water and saline mix)  2. Gargle ~2 oz warm water with 1/4 tsp mixture, each AM/PM (Mix a container of equal parts salt/baking soda and keep next to your toothbrush)  3. Drink about 62 ounces of water each day (water, milk, tea, juice- not caffeine or alcohol)  4. Gel saline spray to be used AM and/or PM ([employer]yr on [employer]- \"gel\" \"spray\")  5. Humidifier or bowl of warm water near your bed or in your office in the winter. Aim for about 45-55% humidity to prevent your mucosal tissues from over-drying during the winter. If you aren't sure what the humidity level is, you can purchase a hygrometer for about $10.  6. Steaming: hot showers, boil a pot of water and hold a towel over your head to make a steam tent, facial steamers, wring out a hot, damp washcloth and then hold it over your nose and mouth while breathing.   Be particularly diligent about using the sinus rinse and gargle after you've been heavily exposed to dust, dirt, pollens, etc.     VOICE EXERCISES (AM & PM, more if needed)  -- Inhale SILENTLY and feel your belly fill with air  -- Slowly exhale as your belly contracts, ROUND \"OO\" LIPS  -- Straw and 1 inch of water in a cup  -- \"Whoooooo\" (smooth bubbles like a motor boat or a simmering pot of water)    3x silently with just air for 5-7 seconds    3x short easy sighs on \"whooo\"     3x foghorn on single pitch for 5-7 seconds    3x sliding lower for 5-7 seconds (\"ding dong\")    3x sliding higher for 5-7 seconds (\"here comes the bride\")    3x sliding up and down like sirens  -- Double-check that your LIPS are ROUNDED, jaw is relaxed        Thank you and have a great day!  Shara"

## 2021-11-01 NOTE — LETTER
11/1/2021       RE: Zayra Ramirez  [id number] [place] [address]     Dear Colleague,    Thank you for referring your patient, Zayra Ramirez, to the [facility]ICE [facility] [place] [facility]. Please see a copy of my visit note below.    Zayra Ramirez is a 51 year old female who is being evaluated via a billable video visit.      Zayra has been notified and verbally consented to the following:     This video visit will be conducted between you and your provider.    Patient has opted to conduct today's video visit vs an in-person appointment.     Video visits are billed at different rates depending on your insurance coverage. Please reach out to your insurance provider with any questions.     If during the course of the call the provider feels the appointment is not appropriate, you will not be charged for this service.  Provider has received verbal consent for billable virtual visit from the patient? Yes  Will anyone else be joining your video visit? No    Call initiated at: 1400   Type of Visit Platform Used: [email or web address] Video  Location of provider: Home  Location of patient: [facility] VOICE CLINIC  PROGRESS REPORT (CPT 95793)    Patient: Zayra Ramirez  Date of Service: 11/1/2021  Date of Last Service: 10/1/2021  Number of Visits: 2  Referring Physician: Dr. Mijares  Impressions from evaluation on 10/1/2021 by Chon Rendon (voice), M.S., CCC-SLP:  Zayra Ramirez is presenting today with Dysphonia (R49.0) in the context of Laryngeal Hyperfunction (J38.7) and Polypoid tissue changes secondary to smoking and recent thrush infection. Perceptually, Zayra demonstrates moderately rough and strained voice quality, perilaryngeal tension, and was noted to throat clear occasionally during evaluation. Laryngoscopy without stroboscopy was completed by MD on 9/29/2021 revealing diffusely edematous laryngeal tissue with irregular vocal fold  edges, small white areas indicative of ongoing thrush, and polypoid changes. Based on today's evaluation, Zayra would benefit from a course of speech therapy to improve laryngeal efficiency, reduce laryngeal irritation, improve voice quality and balance of intrinsic/extrinsic laryngeal musculature.     SUBJECTIVE:  Since Zayra's last session, she reports the following:   o Overall symptoms are improving and she's been practicing her bubble exercises.   o She's starting to be able to add her voice to the bubble tasks.   o Her throat is still dry feeling. She continues working on smoking cessation.     OBJECTIVE:  Patient Specific Goal Metrics:  Dysponia SLP Goals 10/1/2021   How would you rate your speaking voice quality, if 0 is worst voice quality, and 10 is best voice? 5   How much effort is it to speak, if 0 is no extra effort and 10 is maximum effort? 7   How much does your voice problem bother you? Quite a bit       THERAPEUTIC ACTIVITIES  Today Zayra participated in the following therapeutic activities:  Counseling and Education:    Asked questions about the nature of her symptoms, and I answered all of these thoroughly.    I provided Zayra with explanation and skilled instruction of:    Concepts and strategies for managing laryngopharyngeal reflux disorder, to reduce laryngeal inflammation. This included education regarding the impact of reflux on the laryngeal system and management tasks including diet modification, head of bed elevation, avoidance of oral intake prior to laying down, smoking cessation, and stress management.    Concepts and techniques for using saline and plain-water gargling, nasal irrigation, gel saline spray, humidification, increased liquid intake, and steam to increase systemic and typical hydration and reduce post-nasal drainage and laryngeal irritation.     Instruction of Home Practice:    A revised regimen for home practice was instructed.    I provided an AVS of important  notes of today's therapeutic activities to facilitate practice.    ASSESSMENT/PLAN  Progress toward long-term goals:  Adequate but incomplete progress; please see above    Impressions:  IMPRESSIONS: Dysphonia (R49.0) in the context of Laryngeal Hyperfunction (J38.7) and Polypoid tissue changes secondary to smoking and recent thrush infection      Zayra had a productive session of therapy today, working on reflux precautions and mucus management.  She will continue to work on her exercises on a daily basis, and work on incorporating the techniques into her daily activities. Speech therapy continues to be medically necessary for Zayra to participate fully and engage in activities of daily living.     Plan:   I will see Zayra in 2 weeks and will plan to review LPR and PND, work on adding voice to cup bubbles.   For practice goals, see AVS.     TOTAL SERVICE TIME: 50 minutes  TREATMENT (84049)  NO CHARGE FACILITY FEE    Chon Rendon (voice), M.S., CCC-SLP  Speech-Language Pathologist  [facility]  [phone]  [email or web address]  Pronouns: she/her/hers      *this report was created in part through the use of computerized dictation software, and though reviewed following completion, some typographic errors may persist.  If there is confusion regarding any of this notes contents, please contact me for clarification      Again, thank you for allowing me to participate in the care of your patient.      Sincerely,    Shara Samuel, SLP

## 2021-11-02 ENCOUNTER — OFFICE VISIT (OUTPATIENT)
Dept: INTERNAL MEDICINE | Facility: CLINIC | Age: 51
End: 2021-11-02
Payer: COMMERCIAL

## 2021-11-02 VITALS
RESPIRATION RATE: 16 BRPM | OXYGEN SATURATION: 95 % | BODY MASS INDEX: 39.51 KG/M2 | SYSTOLIC BLOOD PRESSURE: 118 MMHG | WEIGHT: 223 LBS | DIASTOLIC BLOOD PRESSURE: 77 MMHG | HEART RATE: 89 BPM | HEIGHT: 63 IN

## 2021-11-02 DIAGNOSIS — M54.9 CHRONIC BACK PAIN, UNSPECIFIED BACK LOCATION, UNSPECIFIED BACK PAIN LATERALITY: Primary | ICD-10-CM

## 2021-11-02 DIAGNOSIS — G89.29 CHRONIC BACK PAIN, UNSPECIFIED BACK LOCATION, UNSPECIFIED BACK PAIN LATERALITY: Primary | ICD-10-CM

## 2021-11-02 PROCEDURE — 99213 OFFICE O/P EST LOW 20 MIN: CPT | Mod: GE | Performed by: STUDENT IN AN ORGANIZED HEALTH CARE EDUCATION/TRAINING PROGRAM

## 2021-11-02 RX ORDER — BUSPIRONE HYDROCHLORIDE 15 MG/1
15 TABLET ORAL EVERY MORNING
COMMUNITY
Start: 2021-10-27 | End: 2022-01-17

## 2021-11-02 ASSESSMENT — MIFFLIN-ST. JEOR: SCORE: 1600.65

## 2021-11-02 ASSESSMENT — PAIN SCALES - GENERAL: PAINLEVEL: NO PAIN (1)

## 2021-11-02 NOTE — NURSING NOTE
Zayra Ramirez is a 51 year old female patient that presents today in clinic for the following:    Chief Complaint   Patient presents with     Pain     Patient comes to check on neck and back pain, patient reports that it first began 2 weeks ago after an incident with her back. Patients says the pain varies with how she positions her spine.      The patient's allergies and medications were reviewed as noted. A set of vitals were recorded as noted without incident. The patient does not have any other questions for the provider.    Cy Rich, EMT at 1:40 PM on 11/2/2021

## 2021-11-02 NOTE — PROGRESS NOTES
PRIMARY CARE CENTER     Patient Name: Zayra Ramirez  YOB: 1970  MRN: [id number]    Date of Service: November 2, 2021  Chief Complaint: Back pain         HISTORY OF PRESENT ILLNESS:      Ms. Hill a 52 YO F with a h/o ILD, COPD, ROBERT, GERD, HTN, RLS, morbid obesity, seronegative inflammatory arthritis, c spine stenosis with myelopathy,S/P C3-5 fusion 2017, chronic midline LBP (hx LRFA), Right L5 lumbar radiculopathy, right SI dysfunction, hx epidural abscess with laminectomy and drainage, right lateral femoral cutaneous nerve impingement, left shoulder impingement, TMJ, borderline PD, and tobacco use disorder.    She comes in with an acute complaint of recent R sided lower back pain. The episode started 2 weeks ago. The pain radiates to her R lateral side, was constant, and exacerbated by movement. She rested in bed for ~ 1 week, and now has no pain right now. She denies a h/o falls or recent trauma to her back. She denies any difficulty with ambulation now. She was previously unsteady on her feet. She works as a , and her work understands that she was feeling unwell lately.    Per chart review she had a steroid injection ~ 4 weeks ago with Dr Florian. She has follow-up in the pain clinic 11/15/21. She also has follow-up with orthopedics 11/11/21.         PAST MEDICAL HISTORY:     Past Medical History:   Diagnosis Date     Allergic rhinitis      Anemia      Arthritis      Asthma     copd     Dental abscess 8-2015     Depressive disorder      Gastroesophageal reflux disease      History of emphysema      Hoarseness      Hypertension      Morbid obesity with BMI of 40.0-44.9, adult (H)      Obstructive sleep apnea      Other chronic pain      Respiratory bronchiolitis interstitial lung disease (H)      Sleep apnea           REVIEW OF SYSTEMS:     10 point ROS was negative except as noted in HPI         HOME MEDICATIONS:     Current Outpatient Medications   Medication     albuterol (PROAIR HFA,  "PROVENTIL HFA, VENTOLIN HFA) 108 (90 BASE) MCG/ACT inhaler     ARIPiprazole (ABILIFY) 15 MG tablet     busPIRone (BUSPAR) 15 MG tablet     busPIRone HCl (BUSPAR) 30 MG tablet     carboxymethylcellulose (CARBOXYMETHYLCELLULOSE SODIUM) 0.5 % SOLN ophthalmic solution     celecoxib (CELEBREX) 200 MG capsule     cetirizine (ZYRTEC) 10 MG tablet     cholecalciferol ( ULTRA STRENGTH) 2000 units CAPS     clotrimazole (MYCELEX) 10 MG lozenge     cyclobenzaprine (FLEXERIL) 10 MG tablet     dexamethasone AF (DECADRON) 0.1 MG/ML solution     DULoxetine (CYMBALTA) 60 MG capsule     EPINEPHrine (EPIPEN/ADRENACLICK/OR ANY BX GENERIC EQUIV) 0.3 MG/0.3ML injection 2-pack     ferrous sulfate (FEROSUL) 325 (65 Fe) MG tablet     fluconazole (DIFLUCAN) 100 MG tablet     folic acid (FOLVITE) 1 MG tablet     lisinopril-hydrochlorothiazide (ZESTORETIC) 20-25 MG tablet     methotrexate sodium 2.5 MG TABS     montelukast (SINGULAIR) 10 MG tablet     nystatin (MYCOSTATIN) 482257 UNIT/GM external cream     omeprazole (PRILOSEC) 40 MG DR capsule     OXYGEN-HELIUM IN     pregabalin (LYRICA) 150 MG capsule     Respiratory Therapy Supplies ([facility] CPAP FILTER) MISC     rOPINIRole (REQUIP) 0.5 MG tablet     vitamin C 500 MG TABS     zinc sulfate (ZINCATE) 220 (50 Zn) MG capsule     Current Facility-Administered Medications   Medication     ropivacaine (NAROPIN) injection 1 mL     triamcinolone (KENALOG-40) injection 40 mg     Facility-Administered Medications Ordered in Other Visits   Medication     Lidocaine 1 % injection 0.5-5 mL     sodium chloride (PF) 0.9% PF flush 5-50 mL            PHYSICAL EXAM:   Vitals: /77 (BP Location: Right arm, Patient Position: Sitting, Cuff Size: Adult Large)   Pulse 89   Resp 16   Ht 1.6 m (5' 3\")   Wt 101.2 kg (223 lb)   LMP  (LMP Unknown)   SpO2 95%   BMI 39.50 kg/m      Wt Readings from Last 4 Encounters:   11/02/21 101.2 kg (223 lb)   10/26/21 100.7 kg (222 lb)   10/14/21 100.7 kg " (222 lb)   09/14/21 100.7 kg (222 lb)     GENERAL: Pleasant adult woman, alert, interactive, in NAD  HEENT: Normocephalic, atraumatic, PERRL, EOMI, no conjunctivitis, anicteric sclera, no cervical LAD, no thyromegaly, no oropharyngeal lesions or erythema  LUNGS: clear to auscultation bilaterally, no crackles or wheezes  HEART: regular rate and rhythm, normal S1 and S2, no murmur appreciated  ABDOMEN: Soft, nontender, nondistended. +BS, no HSM or masses, no rebound or guarding.  MUSCULOSKELETAL: no tenderness to spinous processes, no chest wall tenderness  EXTREMITIES: No LE edema bilaterally  SKIN: Warm and dry, no jaundice or acute rashes  NEURO: CN II-XII intact, sensation intact, normal gait  PSYCH: A&O to person, place and time. appropriate mood, normal speech, linear thought.          DATA:     No new labs or imaging.          ASSESSMENT & PLAN:     Ms. Hill a 52 YO F with chronic back pain who presents with an acute complaint of back pain last week that is now resolved following conservative management. During our visit we provided supportive listening and empathy. She already has a plan in place with the pain clinic and orthopedics in the upcoming week, which I advised her to stick to.  Patient care plan discussed with attending physician, Dr. Siddiqui, who agreed with above.    Mick Avalos Jr, MD  Internal Medicine, PGY-3  [phone]    While the patient was in clinic, I reviewed the pertinent medical history and results.  I discussed the current findings on physical examination, as well as the patient s diagnosis and treatment plan with the resident and agree with the information as documented with the following exceptions: none.  Thalia Siddiqui MD  Internal Medicine

## 2021-11-08 ASSESSMENT — ENCOUNTER SYMPTOMS
DISTURBANCES IN COORDINATION: 1
POLYDIPSIA: 1
MEMORY LOSS: 1
RECTAL PAIN: 0
ABDOMINAL PAIN: 0
SINUS CONGESTION: 0
MYALGIAS: 1
DOUBLE VISION: 1
HEADACHES: 0
NAUSEA: 0
EYE REDNESS: 0
DECREASED CONCENTRATION: 1
TINGLING: 1
TASTE DISTURBANCE: 0
NUMBNESS: 0
CONSTIPATION: 1
POSTURAL DYSPNEA: 1
COUGH: 0
SNORES LOUDLY: 1
LOSS OF CONSCIOUSNESS: 0
ARTHRALGIAS: 1
SPUTUM PRODUCTION: 1
INSOMNIA: 1
FEVER: 0
WEIGHT GAIN: 0
STIFFNESS: 1
DIFFICULTY URINATING: 1
NECK MASS: 0
FATIGUE: 1
WHEEZING: 1
HEMATURIA: 0
NIGHT SWEATS: 1
DEPRESSION: 1
SPEECH CHANGE: 0
DIZZINESS: 1
WEIGHT LOSS: 0
MUSCLE WEAKNESS: 1
NERVOUS/ANXIOUS: 1
SINUS PAIN: 0
DIARRHEA: 1
POLYPHAGIA: 0
EYE WATERING: 0
CHILLS: 1
BLOOD IN STOOL: 0
PANIC: 0
BACK PAIN: 1
ALTERED TEMPERATURE REGULATION: 1
JOINT SWELLING: 1
VOMITING: 0
FLANK PAIN: 0
HEMOPTYSIS: 0
MUSCLE CRAMPS: 1
TREMORS: 0
DYSURIA: 0
SORE THROAT: 0
COUGH DISTURBING SLEEP: 1
EYE PAIN: 1
INCREASED ENERGY: 1
DYSPNEA ON EXERTION: 1
HALLUCINATIONS: 0
HOARSE VOICE: 1
PARALYSIS: 0
TROUBLE SWALLOWING: 1
NECK PAIN: 1
BOWEL INCONTINENCE: 0
EYE IRRITATION: 1
SEIZURES: 0
DECREASED APPETITE: 0
SMELL DISTURBANCE: 0
BLOATING: 1
WEAKNESS: 1
HEARTBURN: 1
JAUNDICE: 0
SHORTNESS OF BREATH: 1

## 2021-11-09 ENCOUNTER — PRE VISIT (OUTPATIENT)
Dept: GASTROENTEROLOGY | Facility: CLINIC | Age: 51
End: 2021-11-09

## 2021-11-09 ENCOUNTER — VIRTUAL VISIT (OUTPATIENT)
Dept: GASTROENTEROLOGY | Facility: CLINIC | Age: 51
End: 2021-11-09
Payer: COMMERCIAL

## 2021-11-09 VITALS — HEIGHT: 63 IN | BODY MASS INDEX: 39.51 KG/M2 | WEIGHT: 223 LBS

## 2021-11-09 DIAGNOSIS — E66.01 MORBID (SEVERE) OBESITY DUE TO EXCESS CALORIES (H): ICD-10-CM

## 2021-11-09 DIAGNOSIS — R19.7 DIARRHEA, UNSPECIFIED TYPE: Primary | ICD-10-CM

## 2021-11-09 PROCEDURE — 99215 OFFICE O/P EST HI 40 MIN: CPT | Mod: 95 | Performed by: INTERNAL MEDICINE

## 2021-11-09 RX ORDER — CHOLESTYRAMINE 4 G/9G
POWDER, FOR SUSPENSION ORAL
Qty: 378 G | Refills: 0 | Status: SHIPPED | OUTPATIENT
Start: 2021-11-09 | End: 2022-01-17

## 2021-11-09 ASSESSMENT — MIFFLIN-ST. JEOR: SCORE: 1600.65

## 2021-11-09 NOTE — NURSING NOTE
"Chief Complaint   Patient presents with     New Patient       Vitals:    11/09/21 1410   Weight: 101.2 kg (223 lb)   Height: 1.6 m (5' 3\")       Body mass index is 39.5 kg/m .    Zainab Diaz CMA    "

## 2021-11-09 NOTE — PROGRESS NOTES
"Zayra Ramirez is a 51 year old female who is being evaluated via a billable video visit.      The patient has been notified of following:     \"This video visit will be conducted via a call between you and your physician/provider. We have found that certain health care needs can be provided without the need for an in-person physical exam.  This service lets us provide the care you need with a video conversation.  If a prescription is necessary we can send it directly to your pharmacy.  If lab work is needed we can place an order for that and you can then stop by our lab to have the test done at a later time.    If during the course of the call the physician/provider feels a video visit is not appropriate, you will not be charged for this service.\"     Patient confirmed that they are in [place] for today's visit yes.    Video-Visit Details  Type of service:  Video Visit    Video Start Time: 237p  Video End Time:  256p    Originating Location (pt. Location): Home    Distant Location (provider location):  [facility] [place]     Platform used: Rosey            "

## 2021-11-09 NOTE — PROGRESS NOTES
Referring provider: Neena Tam    CC: 51 year old female referred by Neena Tam for evaluation of diarrhea.    HPI:   52 yo woman with morbid obesity (BMI 39), ROBERT, GERD with esophagitis, COPD who presents for diarrhea.    Sick after covid vaccine. Started having diarrhea. No blood or melena, but was darker once (she states like goose poop). No abdominal pain. No recent antibiotics. Used antifungals this summer. Weight went down initially, now stable. Sometimes feels constipated. Felt better after a clean out this summer, but now states she feels cleaned out.     A 10 point ROS is otherwise negative.    Past Medical History:   Diagnosis Date     Allergic rhinitis      Anemia      Arthritis      Asthma     copd     Dental abscess 8-2015     Depressive disorder      Gastroesophageal reflux disease      History of emphysema      Hoarseness      Hypertension      Morbid obesity with BMI of 40.0-44.9, adult (H)      Obstructive sleep apnea      Other chronic pain      Respiratory bronchiolitis interstitial lung disease (H)      Sleep apnea      Past Surgical History:   Procedure Laterality Date     CERVICAL FUSION       CHOLECYSTECTOMY       COLONOSCOPY       COLONOSCOPY N/A 2/6/2020    Procedure: COLONOSCOPY, WITH POLYPECTOMY AND BIOPSY;  Surgeon: Julian Mccullough MD;  Location:  GI     ENT SURGERY       ESOPHAGOSCOPY, GASTROSCOPY, DUODENOSCOPY (EGD), COMBINED N/A 2/6/2020    Procedure: ESOPHAGOGASTRODUODENOSCOPY (EGD);  Surgeon: Julian Mccullough MD;  Location:  GI     EXCISE LESION INTRAORAL Bilateral 10/3/2018    Procedure: EXCISE LESION INTRAORAL;  Wide Local Excision Of of Left Oral Cavity Ulcer;  Surgeon: Morro Mijares MD;  Location:  OR      DRAIN SKIN ABSCESS SIMPLE/SINGLE  3/16/2012    Procedure:INCISION AND DRAINAGE, ABSCESS, SIMPLE; Surgeon:CHRISTIANO HANCOCK; Location: GI     HEAD & NECK SURGERY       HYSTERECTOMY       HYSTERECTOMY       INCISION AND DRAINAGE  ABDOMEN WASHOUT, COMBINED       INJECT EPIDURAL CERVICAL N/A 10/14/2021    Procedure: Cervical 7- Thoracic 1 epidural steroid injection with fluoroscopy;  Surgeon: Tram Florian MD;  Location: [facility] OR     INJECT EPIDURAL LUMBAR Right 9/15/2020    Procedure: Lumbar5- sacral 1 epidural steroid injection with fluoroscopy;  Surgeon: Tram Florian MD;  [facility]     INJECT EPIDURAL LUMBAR Right 6/29/2021    Procedure: Lumbar 5 sacral 1 epidural steroid injection with fluoroscopy;  Surgeon: Tram Florian MD;  Location: [facility] OR     INJECT SACROILIAC JOINT Bilateral 6/16/2020    Procedure: Bilateral sacroiliac joint steroid injection with fluoroscopy;  Surgeon: Tram Florian MD;  [facility]     LAMINECTOMY THORACIC ONE LEVEL N/A 8/19/2019    Procedure: LAMINECTOMY, SPINE, THORACIC, 11-12 and Part of Lumbar 1, DRAINAGE OF EPIDURAL ABCESS, Epidural Drain Placement X 2;  Surgeon: Yadiel Beal MD;  [facility]: [facility]     DE PERCUT IMPLNT NEUROELECT,EPIDURAL N/A 8/8/2019    Procedure: TRIAL, SPINAL CORD STIMULATOR WITH BOSTON SCIENTIFIC;  Surgeon: Sipple, Daniel Peter, DO;  Location: [facility];  Service: Pain     RADIO FREQUENCY ABLATION / DESTRUCTION OF SACROILOAC JOINT DORSAL PRIMARY RAMUS Bilateral 12/17/2019    Procedure: Bilateral lumbar radiofrequency ablation with fluoroscopy and intravenous sedation ( Lumbar 2,3,4,5 medial branch nerves for the bilateral lumbar3-4, 4-5 and 5-sacral1 joints.;  Surgeon: Tram Florian MD;  [facility]     RADIO FREQUENCY ABLATION / DESTRUCTION OF SACROILOAC JOINT DORSAL PRIMARY RAMUS Right 11/17/2020    Procedure: Right lumbar medial branch nerve radiofrequency ablation right L2,3,4,5 nerves supplying the right L3-4, L4-5 and L5-S1 facet joints;  Surgeon: Tram Florian MD;  Location: [facility] OR     spinal cord stimulator  08/08/2019     spinal cord stimulator removal  08/13/2019     Family History   Problem Relation Age of Onset     Other Cancer Father          base of tongue cancer at age ~65     Hypertension Father      Back Pain Father      Restless Leg Syndrome Father      Asthma Mother      Restless Leg Syndrome Mother      Restless Leg Syndrome Sister      Breast Cancer Maternal Aunt 55     Social History     Tobacco Use     Smoking status: Current Every Day Smoker     Packs/day: 0.50     Years: 30.00     Pack years: 15.00     Types: Cigarettes     Start date: 1/1/1996     Smokeless tobacco: Never Used     Tobacco comment: has tried the patch   Substance Use Topics     Alcohol use: No     Current Outpatient Medications   Medication     albuterol (PROAIR HFA, PROVENTIL HFA, VENTOLIN HFA) 108 (90 BASE) MCG/ACT inhaler     ARIPiprazole (ABILIFY) 15 MG tablet     busPIRone (BUSPAR) 15 MG tablet     busPIRone HCl (BUSPAR) 30 MG tablet     carboxymethylcellulose (CARBOXYMETHYLCELLULOSE SODIUM) 0.5 % SOLN ophthalmic solution     celecoxib (CELEBREX) 200 MG capsule     cetirizine (ZYRTEC) 10 MG tablet     cholecalciferol ( ULTRA STRENGTH) 2000 units CAPS     clotrimazole (MYCELEX) 10 MG lozenge     cyclobenzaprine (FLEXERIL) 10 MG tablet     dexamethasone AF (DECADRON) 0.1 MG/ML solution     DULoxetine (CYMBALTA) 60 MG capsule     EPINEPHrine (EPIPEN/ADRENACLICK/OR ANY BX GENERIC EQUIV) 0.3 MG/0.3ML injection 2-pack     ferrous sulfate (FEROSUL) 325 (65 Fe) MG tablet     fluconazole (DIFLUCAN) 100 MG tablet     folic acid (FOLVITE) 1 MG tablet     lisinopril-hydrochlorothiazide (ZESTORETIC) 20-25 MG tablet     methotrexate sodium 2.5 MG TABS     montelukast (SINGULAIR) 10 MG tablet     nystatin (MYCOSTATIN) 985261 UNIT/GM external cream     omeprazole (PRILOSEC) 40 MG DR capsule     OXYGEN-HELIUM IN     pregabalin (LYRICA) 150 MG capsule     Respiratory Therapy Supplies ([facility] CPAP FILTER) MISC     rOPINIRole (REQUIP) 0.5 MG tablet     vitamin C 500 MG TABS     zinc sulfate (ZINCATE) 220 (50 Zn) MG capsule     Current Facility-Administered Medications    Medication     ropivacaine (NAROPIN) injection 1 mL     triamcinolone (KENALOG-40) injection 40 mg     Facility-Administered Medications Ordered in Other Visits   Medication     Lidocaine 1 % injection 0.5-5 mL     sodium chloride (PF) 0.9% PF flush 5-50 mL     Physical exam:  GEN: NAD  Eyes: EOMI  Ears: hearing intact  Mouth: MMM  Neck: full ROM  Cardiopulmonary: non labored  Skin: no jaundice  Neuro: awake and oriented  MSK: moves arms equally  Psych: normal affect     Labs:   CMP 8/31/21 k 3.2, cr 1.36 otherwise normal  CBC 8/31/21 nml    Imaging:   No pertinent abdominal imaging.     Endoscopy:  EGD 2/6/20  Findings:        LA Grade A (one or more mucosal breaks less than 5 mm, not extending        between tops of 2 mucosal folds) esophagitis with no bleeding was found        at the gastroesophageal junction.        The exam of the esophagus was otherwise normal.        The entire examined stomach was normal.        The examined duodenum was normal.     Colonoscopy 2/6/20  Impression:          - One 3 mm polyp in the sigmoid colon, removed with a                        cold snare. Resected and retrieved.                        - The examined portion of the ileum was normal.     Assessment and recommendations:   50 yo woman with morbid obesity (BMI 39), ROBERT, GERD with esophagitis, COPD who presents for diarrhea.    Chronic diarrhea  Since this summer after receiving covid vaccine. Although she notes mostly diarrhea, she is still having some regular BM, which raises question of overflow diarrhea or IBS.     - C diff  - Enteric pathogen panel  - trial of cholestyramine  - follow up in 1-2 months in person (numerous connectivity issues with every visit option)    Morbid obesity  GERD, ROBERT.   - Consider referral to bariatric surgery.     RTC 4-8 weeks    42 minutes spent on the date of the encounter doing chart review, test interpretation, history and exam, documentation and further activities as noted above.

## 2021-11-09 NOTE — LETTER
11/9/2021         RE: Zayra Ramirez  [id number] [place] [address]        Dear Colleague,    Thank you for referring your patient, Zayra Ramirez, to the [facility] [place]. Please see a copy of my visit note below.    Referring provider: Neena Tam    CC: 51 year old female referred by Neena Tam for evaluation of diarrhea.    HPI:   50 yo woman with morbid obesity (BMI 39), ROBERT, GERD with esophagitis, COPD who presents for diarrhea.    Sick after covid vaccine. Started having diarrhea. No blood or melena, but was darker once (she states like goose poop). No abdominal pain. No recent antibiotics. Used antifungals this summer. Weight went down initially, now stable. Sometimes feels constipated. Felt better after a clean out this summer, but now states she feels cleaned out.     A 10 point ROS is otherwise negative.    Past Medical History:   Diagnosis Date     Allergic rhinitis      Anemia      Arthritis      Asthma     copd     Dental abscess 8-2015     Depressive disorder      Gastroesophageal reflux disease      History of emphysema      Hoarseness      Hypertension      Morbid obesity with BMI of 40.0-44.9, adult (H)      Obstructive sleep apnea      Other chronic pain      Respiratory bronchiolitis interstitial lung disease (H)      Sleep apnea      Past Surgical History:   Procedure Laterality Date     CERVICAL FUSION       CHOLECYSTECTOMY       COLONOSCOPY       COLONOSCOPY N/A 2/6/2020    Procedure: COLONOSCOPY, WITH POLYPECTOMY AND BIOPSY;  Surgeon: Julian Mccullough MD;  Location:  GI     ENT SURGERY       ESOPHAGOSCOPY, GASTROSCOPY, DUODENOSCOPY (EGD), COMBINED N/A 2/6/2020    Procedure: ESOPHAGOGASTRODUODENOSCOPY (EGD);  Surgeon: Julian Mccullough MD;  Location:  GI     EXCISE LESION INTRAORAL Bilateral 10/3/2018    Procedure: EXCISE LESION INTRAORAL;  Wide Local Excision Of of Left Oral Cavity Ulcer;  Surgeon: Dyllan  Morro Boles MD;  [facility]: [facility] OR     HC DRAIN SKIN ABSCESS SIMPLE/SINGLE  3/16/2012    Procedure:INCISION AND DRAINAGE, ABSCESS, SIMPLE; Surgeon:CHRISTIANO HANCOCK; Location: GI     HEAD & NECK SURGERY       HYSTERECTOMY       HYSTERECTOMY       INCISION AND DRAINAGE ABDOMEN WASHOUT, COMBINED       INJECT EPIDURAL CERVICAL N/A 10/14/2021    Procedure: Cervical 7- Thoracic 1 epidural steroid injection with fluoroscopy;  Surgeon: Tram Florian MD;  Location: [facility] OR     INJECT EPIDURAL LUMBAR Right 9/15/2020    Procedure: Lumbar5- sacral 1 epidural steroid injection with fluoroscopy;  Surgeon: Tram Florian MD;  [facility]     INJECT EPIDURAL LUMBAR Right 6/29/2021    Procedure: Lumbar 5 sacral 1 epidural steroid injection with fluoroscopy;  Surgeon: Tram Florian MD;  Location: [facility] OR     INJECT SACROILIAC JOINT Bilateral 6/16/2020    Procedure: Bilateral sacroiliac joint steroid injection with fluoroscopy;  Surgeon: Tram Florian MD;  [facility]     LAMINECTOMY THORACIC ONE LEVEL N/A 8/19/2019    Procedure: LAMINECTOMY, SPINE, THORACIC, 11-12 and Part of Lumbar 1, DRAINAGE OF EPIDURAL ABCESS, Epidural Drain Placement X 2;  Surgeon: Yadiel Beal MD;  [facility]: [facility] OR     HI PERCUT IMPLNT NEUROELECT,EPIDURAL N/A 8/8/2019    Procedure: TRIAL, SPINAL CORD STIMULATOR WITH BOSTON SCIENTIFIC;  Surgeon: Sipple, Daniel Peter, DO;  Location: [facility];  Service: Pain     RADIO FREQUENCY ABLATION / DESTRUCTION OF SACROILOAC JOINT DORSAL PRIMARY RAMUS Bilateral 12/17/2019    Procedure: Bilateral lumbar radiofrequency ablation with fluoroscopy and intravenous sedation ( Lumbar 2,3,4,5 medial branch nerves for the bilateral lumbar3-4, 4-5 and 5-sacral1 joints.;  Surgeon: Tram Florian MD;  [facility]     RADIO FREQUENCY ABLATION / DESTRUCTION OF SACROILOAC JOINT DORSAL PRIMARY RAMUS Right 11/17/2020    Procedure: Right lumbar medial branch nerve radiofrequency ablation right L2,3,4,5  nerves supplying the right L3-4, L4-5 and L5-S1 facet joints;  Surgeon: Tram Florian MD;  Location: [facility] OR     spinal cord stimulator  08/08/2019     spinal cord stimulator removal  08/13/2019     Family History   Problem Relation Age of Onset     Other Cancer Father         base of tongue cancer at age ~65     Hypertension Father      Back Pain Father      Restless Leg Syndrome Father      Asthma Mother      Restless Leg Syndrome Mother      Restless Leg Syndrome Sister      Breast Cancer Maternal Aunt 55     Social History     Tobacco Use     Smoking status: Current Every Day Smoker     Packs/day: 0.50     Years: 30.00     Pack years: 15.00     Types: Cigarettes     Start date: 1/1/1996     Smokeless tobacco: Never Used     Tobacco comment: has tried the patch   Substance Use Topics     Alcohol use: No     Current Outpatient Medications   Medication     albuterol (PROAIR HFA, PROVENTIL HFA, VENTOLIN HFA) 108 (90 BASE) MCG/ACT inhaler     ARIPiprazole (ABILIFY) 15 MG tablet     busPIRone (BUSPAR) 15 MG tablet     busPIRone HCl (BUSPAR) 30 MG tablet     carboxymethylcellulose (CARBOXYMETHYLCELLULOSE SODIUM) 0.5 % SOLN ophthalmic solution     celecoxib (CELEBREX) 200 MG capsule     cetirizine (ZYRTEC) 10 MG tablet     cholecalciferol ( ULTRA STRENGTH) 2000 units CAPS     clotrimazole (MYCELEX) 10 MG lozenge     cyclobenzaprine (FLEXERIL) 10 MG tablet     dexamethasone AF (DECADRON) 0.1 MG/ML solution     DULoxetine (CYMBALTA) 60 MG capsule     EPINEPHrine (EPIPEN/ADRENACLICK/OR ANY BX GENERIC EQUIV) 0.3 MG/0.3ML injection 2-pack     ferrous sulfate (FEROSUL) 325 (65 Fe) MG tablet     fluconazole (DIFLUCAN) 100 MG tablet     folic acid (FOLVITE) 1 MG tablet     lisinopril-hydrochlorothiazide (ZESTORETIC) 20-25 MG tablet     methotrexate sodium 2.5 MG TABS     montelukast (SINGULAIR) 10 MG tablet     nystatin (MYCOSTATIN) 413727 UNIT/GM external cream     omeprazole (PRILOSEC) 40 MG DR capsule     OXYGEN-HELIUM  IN     pregabalin (LYRICA) 150 MG capsule     Respiratory Therapy Supplies ([facility] CPAP FILTER) MISC     rOPINIRole (REQUIP) 0.5 MG tablet     vitamin C 500 MG TABS     zinc sulfate (ZINCATE) 220 (50 Zn) MG capsule     Current Facility-Administered Medications   Medication     ropivacaine (NAROPIN) injection 1 mL     triamcinolone (KENALOG-40) injection 40 mg     Facility-Administered Medications Ordered in Other Visits   Medication     Lidocaine 1 % injection 0.5-5 mL     sodium chloride (PF) 0.9% PF flush 5-50 mL     Physical exam:  GEN: NAD  Eyes: EOMI  Ears: hearing intact  Mouth: MMM  Neck: full ROM  Cardiopulmonary: non labored  Skin: no jaundice  Neuro: awake and oriented  MSK: moves arms equally  Psych: normal affect     Labs:   CMP 8/31/21 k 3.2, cr 1.36 otherwise normal  CBC 8/31/21 nml    Imaging:   No pertinent abdominal imaging.     Endoscopy:  EGD 2/6/20  Findings:        LA Grade A (one or more mucosal breaks less than 5 mm, not extending        between tops of 2 mucosal folds) esophagitis with no bleeding was found        at the gastroesophageal junction.        The exam of the esophagus was otherwise normal.        The entire examined stomach was normal.        The examined duodenum was normal.     Colonoscopy 2/6/20  Impression:          - One 3 mm polyp in the sigmoid colon, removed with a                        cold snare. Resected and retrieved.                        - The examined portion of the ileum was normal.     Assessment and recommendations:   50 yo woman with morbid obesity (BMI 39), ROBERT, GERD with esophagitis, COPD who presents for diarrhea.    Chronic diarrhea  Since this summer after receiving covid vaccine. Although she notes mostly diarrhea, she is still having some regular BM, which raises question of overflow diarrhea or IBS.     - C diff  - Enteric pathogen panel  - trial of cholestyramine  - follow up in 1-2 months in person (numerous connectivity issues with every  visit option)    Morbid obesity  GERD, ROBERT.   - Consider referral to bariatric surgery.     RTC 4-8 weeks    42 minutes spent on the date of the encounter doing chart review, test interpretation, history and exam, documentation and further activities as noted above.          Again, thank you for allowing me to participate in the care of your patient.      Sincerely,    Trent Rahman MD     SHORTNESS OF BREATH/COUGH

## 2021-11-09 NOTE — PATIENT INSTRUCTIONS
It was a pleasure taking care of you today.  I've included a brief summary of our discussion and care plan from today's visit below.  Please review this information with your primary care provider.  _______________________________________________________________________    My recommendations are summarized as follows:  - Start cholestyramine- Take 4 grams once per day at least 2 hours after and at lease 2 hours before eating or taking medications.  - Bring in stool sample    Please call our nurse Elda with any questions or concerns- [phone].  --    Return to GI Clinic in 1-2 months to review your progress.    _______________________________________________________________________    Who do I call with any questions after my visit?  Please be in touch if there are any further questions that arise following today's visit.  There are multiple ways to contact your gastroenterology care team.        During business hours, you may reach a Gastroenterology nurse at [phone] and choose option 3.         To schedule or reschedule an appointment, please call [phone].       You can always send a secure message through Kerec[employer].  Kerecis messages are answered by your nurse or doctor typically within 24 hours.  Please allow extra time on weekends and holidays.        For urgent/emergent questions after business hours, you may reach the on-call GI Fellow by contacting the [facility] at [phone].     How will I get the results of any tests ordered?    You will receive all of your results.  If you have signed up for [employer], any tests ordered at your visit will be available to you after your physician reviews them.  Typically this takes 1-2 weeks.  If there are urgent results that require a change in your care plan, your physician or nurse will call you to discuss the next steps.      What is Kerecis?  Kerecis is a secure way for you to access all of your healthcare records from the [facility].  It is a web based computer program, so you can sign on to it from any location.  It also allows you to send secure messages to your care team.  I recommend signing up for varinode access if you have not already done so and are comfortable with using a computer.      How to I schedule a follow-up visit?  If you did not schedule a follow-up visit today, please call [phone] to schedule a follow-up office visit.        Sincerely,    Trent Rahman MD     [facility]  Division of Gastroenterology

## 2021-11-10 ENCOUNTER — TELEPHONE (OUTPATIENT)
Dept: GASTROENTEROLOGY | Facility: CLINIC | Age: 51
End: 2021-11-10
Payer: COMMERCIAL

## 2021-11-10 ENCOUNTER — APPOINTMENT (OUTPATIENT)
Dept: LAB | Facility: CLINIC | Age: 51
End: 2021-11-10
Payer: COMMERCIAL

## 2021-11-10 DIAGNOSIS — G89.29 CHRONIC MIDLINE LOW BACK PAIN, UNSPECIFIED WHETHER SCIATICA PRESENT: ICD-10-CM

## 2021-11-10 DIAGNOSIS — M54.50 CHRONIC MIDLINE LOW BACK PAIN, UNSPECIFIED WHETHER SCIATICA PRESENT: ICD-10-CM

## 2021-11-10 DIAGNOSIS — Z79.899 HIGH RISK MEDICATION USE: ICD-10-CM

## 2021-11-10 DIAGNOSIS — M48.061 DEGENERATIVE LUMBAR SPINAL STENOSIS: Primary | ICD-10-CM

## 2021-11-10 DIAGNOSIS — F17.200 SMOKER: ICD-10-CM

## 2021-11-10 DIAGNOSIS — R19.7 DIARRHEA, UNSPECIFIED TYPE: ICD-10-CM

## 2021-11-10 DIAGNOSIS — M19.90 INFLAMMATORY ARTHRITIS: ICD-10-CM

## 2021-11-10 LAB
ALBUMIN SERPL-MCNC: 3.1 G/DL (ref 3.4–5)
ALT SERPL W P-5'-P-CCNC: 19 U/L (ref 0–50)
AST SERPL W P-5'-P-CCNC: 11 U/L (ref 0–45)
CREAT SERPL-MCNC: 0.99 MG/DL (ref 0.52–1.04)
CRP SERPL-MCNC: 24 MG/L (ref 0–8)
ERYTHROCYTE [DISTWIDTH] IN BLOOD BY AUTOMATED COUNT: 15.1 % (ref 10–15)
GFR SERPL CREATININE-BSD FRML MDRD: 67 ML/MIN/1.73M2
HCT VFR BLD AUTO: 39.8 % (ref 35–47)
HGB BLD-MCNC: 12.5 G/DL (ref 11.7–15.7)
MCH RBC QN AUTO: 31.7 PG (ref 26.5–33)
MCHC RBC AUTO-ENTMCNC: 31.4 G/DL (ref 31.5–36.5)
MCV RBC AUTO: 101 FL (ref 78–100)
PLATELET # BLD AUTO: 326 10E3/UL (ref 150–450)
RBC # BLD AUTO: 3.94 10E6/UL (ref 3.8–5.2)
WBC # BLD AUTO: 7.5 10E3/UL (ref 4–11)

## 2021-11-10 PROCEDURE — 84450 TRANSFERASE (AST) (SGOT): CPT

## 2021-11-10 PROCEDURE — 36415 COLL VENOUS BLD VENIPUNCTURE: CPT

## 2021-11-10 PROCEDURE — 85027 COMPLETE CBC AUTOMATED: CPT

## 2021-11-10 PROCEDURE — 86140 C-REACTIVE PROTEIN: CPT

## 2021-11-10 PROCEDURE — 82040 ASSAY OF SERUM ALBUMIN: CPT

## 2021-11-10 PROCEDURE — 82565 ASSAY OF CREATININE: CPT

## 2021-11-10 PROCEDURE — 84460 ALANINE AMINO (ALT) (SGPT): CPT

## 2021-11-11 ENCOUNTER — ANCILLARY PROCEDURE (OUTPATIENT)
Dept: GENERAL RADIOLOGY | Facility: CLINIC | Age: 51
End: 2021-11-11
Attending: ORTHOPAEDIC SURGERY
Payer: COMMERCIAL

## 2021-11-11 ENCOUNTER — ANCILLARY PROCEDURE (OUTPATIENT)
Dept: CT IMAGING | Facility: CLINIC | Age: 51
End: 2021-11-11
Attending: ORTHOPAEDIC SURGERY
Payer: COMMERCIAL

## 2021-11-11 ENCOUNTER — OFFICE VISIT (OUTPATIENT)
Dept: ORTHOPEDICS | Facility: CLINIC | Age: 51
End: 2021-11-11
Payer: COMMERCIAL

## 2021-11-11 VITALS — BODY MASS INDEX: 39.51 KG/M2 | WEIGHT: 223 LBS | HEIGHT: 63 IN

## 2021-11-11 DIAGNOSIS — M48.061 DEGENERATIVE LUMBAR SPINAL STENOSIS: ICD-10-CM

## 2021-11-11 DIAGNOSIS — G89.29 CHRONIC MIDLINE LOW BACK PAIN, UNSPECIFIED WHETHER SCIATICA PRESENT: ICD-10-CM

## 2021-11-11 DIAGNOSIS — M54.50 CHRONIC MIDLINE LOW BACK PAIN, UNSPECIFIED WHETHER SCIATICA PRESENT: ICD-10-CM

## 2021-11-11 DIAGNOSIS — M54.16 LUMBAR RADICULOPATHY: ICD-10-CM

## 2021-11-11 DIAGNOSIS — M81.0 AGE-RELATED OSTEOPOROSIS WITHOUT CURRENT PATHOLOGICAL FRACTURE: ICD-10-CM

## 2021-11-11 DIAGNOSIS — M48.02 STENOSIS OF CERVICAL SPINE WITH MYELOPATHY (H): ICD-10-CM

## 2021-11-11 DIAGNOSIS — G99.2 STENOSIS OF CERVICAL SPINE WITH MYELOPATHY (H): ICD-10-CM

## 2021-11-11 PROCEDURE — 99205 OFFICE O/P NEW HI 60 MIN: CPT | Mod: GC | Performed by: ORTHOPAEDIC SURGERY

## 2021-11-11 PROCEDURE — 72082 X-RAY EXAM ENTIRE SPI 2/3 VW: CPT | Performed by: STUDENT IN AN ORGANIZED HEALTH CARE EDUCATION/TRAINING PROGRAM

## 2021-11-11 PROCEDURE — 77073 BONE LENGTH STUDIES: CPT | Performed by: STUDENT IN AN ORGANIZED HEALTH CARE EDUCATION/TRAINING PROGRAM

## 2021-11-11 PROCEDURE — 72125 CT NECK SPINE W/O DYE: CPT | Mod: GC | Performed by: RADIOLOGY

## 2021-11-11 ASSESSMENT — MIFFLIN-ST. JEOR: SCORE: 1600.65

## 2021-11-11 NOTE — LETTER
"    11/11/2021         RE: Zayra PADRON James  [address] [place] [address]        Dear Colleague,    Thank you for referring your patient, Zayra Ramirez, to the [facility] [place]. Please see a copy of my visit note below.    Spine Surgical Hx:  10/16/2017 - ACDF with plate fixation C3-C5 (2 levels); use of Cornerstone allograft (Dr. Rylan Monique, [facility]).  [Implants: Medtronic Zevo plate].  08/08/2019 - SCS implantation ([facility]); complicated by epidural abscess MSSA culture positive, treated with drainage/laminectomy and IV antibiotics  80/19/2019 - Laminectomies T11-L1 ([facility]).      In-Person Visit    REASON FOR CONSULTATION: Consult (Lumbar radiculopathy )     REFERRING PHYSICIAN: Tram Florian  PCP:Neena Tam    History of Present Illness:    52 y/o female, RHD, referred by Dr. Florian ([facility]) for low back and neck pain.    History of interstitial lung disease and current smoker, with insidious onset neck and low back pain for 20-30 years. Patient agrees focus of today's visit is her lumbar pain. Patient's pain has progressively worsened over the last few years despite epidural injections and radiofrequency treatment with Dr. Florian. These treatments provide low back pain relief but little leg pain relief. She notes injections are no longer as effective as before. Currently, her pain is \"dull, agonizing\" and constant, radiates down both legs intermittently, she notes intermittent weakness and legs giving way. No side is worse than the other. She notes intermittent numbness and tingling in both legs, however constant numbness over the anterolateral aspect of her right thigh has been present for several years, unknown cause.    Back 80%, Leg 20%,  Right = Left  Worse: walking, prolonged sitting, bending  Better: \"popping\", epidural injections help (30-40% relief of back symptoms), RF (helped with back pain, 60%)    Neck: 30 years, unsure of " "cause, constant achy pain, intermittent sharp, radicular symptoms in left arm, numbness/tingling on left, no weakness.  Epidural injection did not help, last month. Did have 1 year of relief after ACDF.  Worse with \"everything\"  Better: certain positions (\"jazlyn\" position)    Previous treatment:   Heat, ice, epidural injections x 10 years (last injection this summer), radiofrequency ablation (last treatment last spring)  (-) opioids    Previous injections:  1/10/19 - RFA L3-L5 (Dr. Dan Sipple, [facility]).  3/18/19 - R L5-S1 TFESI  12/17/19 - RFA bilateral L3-4, L4-5 and L5-S1 (Dr. Florian).  6/16/20 - Bilateral SIJ injection with steroid ([facility])  9/15/20 - L5-S1 IL EDUARDO ([facility]iqui)  11/17/20 - RFA Right L3-4, L4-5, L5-S1 ([facility]).   6/29/21 - L5-S1 IL EDUARDO ([facility]i)  10/14/21 - C7-T1 IL EDUARDO ([facility]i)      Oswestry (KATIE) Questionnaire    OSWESTRY DISABILITY INDEX 11/8/2021   Count 10   Sum 30   Oswestry Score (%) 60   Some recent data might be hidden          Neck Disability Index (NDI) Questionnaire    No flowsheet data found.         Visual Analog Pain Scale  Back Pain Scale 0-10: 4  Right leg pain: 4  Left leg pain: 0    PROMIS-10 Scores  Global Mental Health Score: (P) 8  Global Physical Health Score: (P) 10  PROMIS TOTAL - SUBSCORES: (P) 18    ROS:  A 12-point review of systems was completed and is negative except for otherwise noted above in the history of present illness.    Med Hx:  Past Medical History:   Diagnosis Date     Allergic rhinitis      Anemia      Arthritis      Asthma     copd     Dental abscess 8-2015     Depressive disorder      Gastroesophageal reflux disease      History of emphysema      Hoarseness      Hypertension      Morbid obesity with BMI of 40.0-44.9, adult (H)      Obstructive sleep apnea      Other chronic pain      Respiratory bronchiolitis interstitial lung disease (H)      Sleep apnea        Surg Hx:  Past Surgical History:   Procedure Laterality Date     CERVICAL FUSION       " COLONOSCOPY       COLONOSCOPY N/A 02/06/2020    Procedure: COLONOSCOPY, WITH POLYPECTOMY AND BIOPSY;  Surgeon: Julian Mccullough MD;  Location:  GI     ENT SURGERY       ESOPHAGOSCOPY, GASTROSCOPY, DUODENOSCOPY (EGD), COMBINED N/A 02/06/2020    Procedure: ESOPHAGOGASTRODUODENOSCOPY (EGD);  Surgeon: Julian Mccullough MD;  [facility]     EXCISE LESION INTRAORAL Bilateral 10/03/2018    Procedure: EXCISE LESION INTRAORAL;  Wide Local Excision Of of Left Oral Cavity Ulcer;  Surgeon: Morro Mijares MD;  [facility]     HC DRAIN SKIN ABSCESS SIMPLE/SINGLE  03/16/2012    Procedure:INCISION AND DRAINAGE, ABSCESS, SIMPLE; Surgeon:CHRISTIANO HANCOCK; Location: GI     HEAD & NECK SURGERY       HYSTERECTOMY       HYSTERECTOMY       INCISION AND DRAINAGE ABDOMEN WASHOUT, COMBINED       INJECT EPIDURAL CERVICAL N/A 10/14/2021    Procedure: Cervical 7- Thoracic 1 epidural steroid injection with fluoroscopy;  Surgeon: Tram Florian MD;  Location: [facility] OR     INJECT EPIDURAL LUMBAR Right 09/15/2020    Procedure: Lumbar5- sacral 1 epidural steroid injection with fluoroscopy;  Surgeon: Tram Florian MD;  [facility]     INJECT EPIDURAL LUMBAR Right 06/29/2021    Procedure: Lumbar 5 sacral 1 epidural steroid injection with fluoroscopy;  Surgeon: Tram Florian MD;  Location: [facility] OR     INJECT SACROILIAC JOINT Bilateral 06/16/2020    Procedure: Bilateral sacroiliac joint steroid injection with fluoroscopy;  Surgeon: Tram Florian MD;  [facility]     LAMINECTOMY THORACIC ONE LEVEL N/A 08/19/2019    Procedure: LAMINECTOMY, SPINE, THORACIC, 11-12 and Part of Lumbar 1, DRAINAGE OF EPIDURAL ABCESS, Epidural Drain Placement X 2;  Surgeon: Yadiel Beal MD;  Location: [facility] OR     AK PERCUT IMPLNT NEUROELECT,EPIDURAL N/A 08/08/2019    Procedure: TRIAL, SPINAL CORD STIMULATOR WITH [employer];  Surgeon: Sipple, Daniel Peter, DO;  Location: [facility] ED;  Service: Pain     RADIO  FREQUENCY ABLATION / DESTRUCTION OF SACROILOAC JOINT DORSAL PRIMARY RAMUS Bilateral 12/17/2019    Procedure: Bilateral lumbar radiofrequency ablation with fluoroscopy and intravenous sedation ( Lumbar 2,3,4,5 medial branch nerves for the bilateral lumbar3-4, 4-5 and 5-sacral1 joints.;  Surgeon: Tram Florian MD;  [facility]     RADIO FREQUENCY ABLATION / DESTRUCTION OF SACROILOAC JOINT DORSAL PRIMARY RAMUS Right 11/17/2020    Procedure: Right lumbar medial branch nerve radiofrequency ablation right L2,3,4,5 nerves supplying the right L3-4, L4-5 and L5-S1 facet joints;  Surgeon: Tram Florian MD;  Location: [facility] OR     spinal cord stimulator  08/08/2019     spinal cord stimulator removal  08/13/2019       Allergies:  Allergies   Allergen Reactions     Bee Venom Anaphylaxis     Bees      Doxycycline Anaphylaxis     Patient thinks it may have been just nausea and vomiting, however unable to confirm     Erythromycin Anaphylaxis and Shortness Of Breath     Other reaction(s): Vomiting     Hydrocodone-Acetaminophen Itching       Meds:  Current Outpatient Medications   Medication     albuterol (PROAIR HFA, PROVENTIL HFA, VENTOLIN HFA) 108 (90 BASE) MCG/ACT inhaler     ARIPiprazole (ABILIFY) 15 MG tablet     busPIRone (BUSPAR) 15 MG tablet     busPIRone HCl (BUSPAR) 30 MG tablet     carboxymethylcellulose (CARBOXYMETHYLCELLULOSE SODIUM) 0.5 % SOLN ophthalmic solution     celecoxib (CELEBREX) 200 MG capsule     cetirizine (ZYRTEC) 10 MG tablet     cholecalciferol ( ULTRA STRENGTH) 2000 units CAPS     cholestyramine (QUESTRAN) 4 GM/DOSE powder     clotrimazole (MYCELEX) 10 MG lozenge     cyclobenzaprine (FLEXERIL) 10 MG tablet     dexamethasone AF (DECADRON) 0.1 MG/ML solution     DULoxetine (CYMBALTA) 60 MG capsule     EPINEPHrine (EPIPEN/ADRENACLICK/OR ANY BX GENERIC EQUIV) 0.3 MG/0.3ML injection 2-pack     ferrous sulfate (FEROSUL) 325 (65 Fe) MG tablet     fluconazole (DIFLUCAN) 100 MG tablet     folic acid  "(FOLVITE) 1 MG tablet     lisinopril-hydrochlorothiazide (ZESTORETIC) 20-25 MG tablet     methotrexate sodium 2.5 MG TABS     montelukast (SINGULAIR) 10 MG tablet     nystatin (MYCOSTATIN) 392697 UNIT/GM external cream     omeprazole (PRILOSEC) 40 MG DR capsule     OXYGEN-HELIUM IN     pregabalin (LYRICA) 150 MG capsule     Respiratory Therapy Supplies (Former[facility] CPAP FILTER) MISC     rOPINIRole (REQUIP) 0.5 MG tablet     vitamin C 500 MG TABS     zinc sulfate (ZINCATE) 220 (50 Zn) MG capsule     Current Facility-Administered Medications   Medication     ropivacaine (NAROPIN) injection 1 mL     triamcinolone (KENALOG-40) injection 40 mg     Facility-Administered Medications Ordered in Other Visits   Medication     Lidocaine 1 % injection 0.5-5 mL     sodium chloride (PF) 0.9% PF flush 5-50 mL       Fam Hx:  Family History   Problem Relation Age of Onset     Other Cancer Father         base of tongue cancer at age ~65     Hypertension Father      Back Pain Father      Restless Leg Syndrome Father      Asthma Mother      Restless Leg Syndrome Mother      Restless Leg Syndrome Sister      Breast Cancer Maternal Aunt 55       P/S Hx:  Social History     Tobacco Use     Smoking status: Current Every Day Smoker     Packs/day: 0.50     Years: 30.00     Pack years: 15.00     Types: Cigarettes     Start date: 1/1/1996     Smokeless tobacco: Never Used     Tobacco comment: has tried the patch   Substance Use Topics     Alcohol use: No         Physical Exam:  Very pleasant, healthy appearing, alert, oriented x 3, cooperative.  Normal mood and affect.  Not in cardiorespiratory distress.  Ht 1.6 m (5' 3\")   Wt 101.2 kg (223 lb)   LMP  (LMP Unknown)   BMI 39.50 kg/m    Normal upright posture.    Normal gait without assistive device.  No antalgia / imbalance.  Back: no deformity, no skin lesions. Midline longitudinal incision at the midback, well healed  Localizes pain at lumbar region  Tenderness: (-) midline, (-) " paraspinal  ROM:   Pain when flexing forward and extending  Cervical spine:    Appearance -no gross step-offs, kyphosis.    Motor -     C5: Deltoids R 5/5 and L 5/5 strength    C6: Biceps R 5/5 and L 5/5 strength     C7: Triceps R 5/5 and L 5/5 strength     C8:  R 5/5 and L 5/5 strength     T1: Dorsal interossei R 4/5 and L 4/5 strength        Sensation: intact to light touch in C5-T1      Special Tests -      Spurling's Test - Negative        Lumbar Spine:    Appearance - No gross stepoffs or deformities    Motor -     L2-3: Hip flexion 5/5 R and 4/5 L strength          L3/4:  Knee extension R 5/5 and L 4/5 strength         L4/5:  Foot dorsiflexion R 5/5 L 4/5 and       EHL dorsiflexion R 4/5 L 4/5 strength         S1:  Plantarflexion/Peroneal Muscles  R 5/5 and L 4/5 strength    Sensation: intact to light touch L3-S1 distribution BLE      Neurologic:      REFLEXES Left Right   Patella 3+ 3+   Ankle jerk 2+ 2+   Babinski No upgoing great toe No upgoing great toe   Clonus 0 beats 0 beats     Hip Exam:  No pain with hip log roll and no tenderness over the greater trochanters.         Imaging:    EOS full spine ap-lat x-rays today show ankylosis T11-12 in kyphotic alignment, with resultant thoracolumbar kyphosis (T10-L2 = 24 deg kyphosis).  Multilevel lumbar spondylosis L1-S1, with disc space narrowing/collapse, and lumbar flatback deformity 2' to upper lumbar kyphosis.  Compensatory lower lumbar hyperlordosis.  Sagittal measurements:  LL 42, ideal 48.5  L4-S1 40, ideal 32  PI 41  PT 12  SVA +2.1cm  TPA 10  GT 13  T10-L2 kyph 24    Impression:    51/f RHD with:  Lumbar spine:  1.  Multilevel lumbar spondylosis with stenosis, L1-S1, with neurogenic claudication.  2.  Spinal sagittal malalignment with thoracolumbar kyphosis (T10-L2= 24 deg kyphosis) and lumbar flatback deformity (LL = 42, ideal 48.5).  3.  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  4.  Chronic active smoking x 35 yrs, currently 1 ppd.  5.   Class II obesity (BMI 39.50).  Cervical spine:  1.  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  2.  S/p ACDF C3-C5 (10/16/2017 Kayden).      Plan:     We discussed how patient does have cervical and lumbar pathology, however at this time her lumbar symptoms are more pressing, although both are equally troublesome, so we focused on addressing her lumbar symptoms at this visit. Patient has exhausted all conservative measures and is no longer receiving adequate relief from epidural injections, and her stenosis continues to worsen. We discussed due to severity of multilevel degenerative changes and resultant stenosis, she may benefit from surgical intervention. Our recommended surgery would be a large multilevel posterior fusion spanning her lower thoracic spine to pelvis. However, prior to moving forward with surgery, we must try to optimize her health. Optimization involves smoking cessation, weight loss, meeting with PAC, and pre-habilitation with PM&R. We will also need to obtain DEXA scans to assess her bone density.     Briefly, regarding her cervical spine, we discussed with her that the fusion did not take and she does have broken hardware, which is likely causing her pain. Given she experienced significant relief for approximately 1 years after ACDF, she would likely benefit from re-attempt at fusion, however this would be a posterior approach and we would not attempt to take existing hardware out. Patient voices understanding of plan and importance of smoking cessation for proper healing after surgery.    - Dexa spine/hip/wrist  - CT lumbar and thoracic  - PM&R for pre-habilitation  - Weight management referral  - PAC referral  - Smoking cessation  - Obtain operative report from ACDF with Dr. Monique's     In-person RTC after above, with lumbar flex-ext x-rays.  If lower lumbar levels (L4-S1) revert to normal alignment on flexion x-ray, may perhaps consider shorter fusion  without going down to pelvis.  I was initially thinking of T10-pelvis fusion.  However, if with good L4-S1 motion, may potentially stop at L4.    All questions and concerns were answered to the patient's apparent satisfaction before leaving the clinic.     Attestation:  I (Dr. Philip Wilhelm - Spine Surgeon) have personally evaluated patient with PGY-1 [place], and agree with findings and plan outlined in the note, which I also edited.  I discussed at length with the patient/family, explained the nature of spinal condition, and formulated workup and/or treatment plan together.  All questions were answered to the best of my ability and to patient's apparent satisfaction.    60 minutes spent on the date of the encounter doing chart review/review of outside records/review of test results/interpretation of tests/patient visit/documentation/discussion with other provider(s)/discussion with patient and family.      Philip Wilhelm MD    Orthopaedic Spine Surgery  Dept Orthopaedic Surgery, [facility], located within [facility]  [phone] office, [phone] pager  [email or web address]

## 2021-11-11 NOTE — TELEPHONE ENCOUNTER
FUTURE VISIT INFORMATION      SURGERY INFORMATION:    Risk Eval per Luís Holley MD      RECORDS REQUESTED FROM:        Primary Care Provider: Neena Tam APRN CNP- [facility]    Pertinent Medical History: hypertension    Most recent EKG+ Tracin21    Most recent PFT's: 20- Jennifer

## 2021-11-11 NOTE — PROGRESS NOTES
"Spine Surgical Hx:  10/16/2017 - ACDF with plate fixation C3-C5 (2 levels); use of [employer] allograft (Dr. Rylan Monique, [facility]).  [Implants: Medtronic Zevo plate].  08/08/2019 - SCS implantation ([place] OR); complicated by epidural abscess MSSA culture positive, treated with drainage/laminectomy and IV antibiotics  80/19/2019 - Laminectomies T11-L1 ([facility]).      In-Person Visit    REASON FOR CONSULTATION: Consult (Lumbar radiculopathy )     REFERRING PHYSICIAN: Tram Florian  PCP:Neena Tam    History of Present Illness:    52 y/o female, RHD, referred by Dr. Florian ([facility]) for low back and neck pain.    History of interstitial lung disease and current smoker, with insidious onset neck and low back pain for 20-30 years. Patient agrees focus of today's visit is her lumbar pain. Patient's pain has progressively worsened over the last few years despite epidural injections and radiofrequency treatment with Dr. Florian. These treatments provide low back pain relief but little leg pain relief. She notes injections are no longer as effective as before. Currently, her pain is \"dull, agonizing\" and constant, radiates down both legs intermittently, she notes intermittent weakness and legs giving way. No side is worse than the other. She notes intermittent numbness and tingling in both legs, however constant numbness over the anterolateral aspect of her right thigh has been present for several years, unknown cause.    Back 80%, Leg 20%,  Right = Left  Worse: walking, prolonged sitting, bending  Better: \"popping\", epidural injections help (30-40% relief of back symptoms), RF (helped with back pain, 60%)    Neck: 30 years, unsure of cause, constant achy pain, intermittent sharp, radicular symptoms in left arm, numbness/tingling on left, no weakness.  Epidural injection did not help, last month. Did have 1 year of relief after ACDF.  Worse with \"everything\"  Better: certain positions (\"jazlyn\" " position)    Previous treatment:   Heat, ice, epidural injections x 10 years (last injection this summer), radiofrequency ablation (last treatment last spring)  (-) opioids    Previous injections:  1/10/19 - RFA L3-L5 (Dr. Dan Sipple, [facility]).  3/18/19 - R L5-S1 TFESI  12/17/19 - RFA bilateral L3-4, L4-5 and L5-S1 (Dr. Florian).  6/16/20 - Bilateral SIJ injection with steroid ([facility])  9/15/20 - L5-S1 IL EDUARDO (Chiqui)  11/17/20 - RFA Right L3-4, L4-5, L5-S1 ([facility]i).   6/29/21 - L5-S1 IL EDUARDO ([facility]iqui)  10/14/21 - C7-T1 IL EDUARDO (Chiqui)      Oswestry (KATIE) Questionnaire    OSWESTRY DISABILITY INDEX 11/8/2021   Count 10   Sum 30   Oswestry Score (%) 60   Some recent data might be hidden          Neck Disability Index (NDI) Questionnaire    No flowsheet data found.         Visual Analog Pain Scale  Back Pain Scale 0-10: 4  Right leg pain: 4  Left leg pain: 0    PROMIS-10 Scores  Global Mental Health Score: (P) 8  Global Physical Health Score: (P) 10  PROMIS TOTAL - SUBSCORES: (P) 18    ROS:  A 12-point review of systems was completed and is negative except for otherwise noted above in the history of present illness.    Med Hx:  Past Medical History:   Diagnosis Date     Allergic rhinitis      Anemia      Arthritis      Asthma     copd     Dental abscess 8-2015     Depressive disorder      Gastroesophageal reflux disease      History of emphysema      Hoarseness      Hypertension      Morbid obesity with BMI of 40.0-44.9, adult (H)      Obstructive sleep apnea      Other chronic pain      Respiratory bronchiolitis interstitial lung disease (H)      Sleep apnea        Surg Hx:  Past Surgical History:   Procedure Laterality Date     CERVICAL FUSION       COLONOSCOPY       COLONOSCOPY N/A 02/06/2020    Procedure: COLONOSCOPY, WITH POLYPECTOMY AND BIOPSY;  Surgeon: Julian Mccullough MD;  Location:  GI     ENT SURGERY       ESOPHAGOSCOPY, GASTROSCOPY, DUODENOSCOPY (EGD), COMBINED N/A 02/06/2020    Procedure:  ESOPHAGOGASTRODUODENOSCOPY (EGD);  Surgeon: Julian Mccullough MD;  Location: [facility]     EXCISE LESION INTRAORAL Bilateral 10/03/2018    Procedure: EXCISE LESION INTRAORAL;  Wide Local Excision Of of Left Oral Cavity Ulcer;  Surgeon: Morro Mijares MD;  Location: [facility] OR     HC DRAIN SKIN ABSCESS SIMPLE/SINGLE  03/16/2012    Procedure:INCISION AND DRAINAGE, ABSCESS, SIMPLE; Surgeon:CHRISTIAON HANCOCK; Location: GI     HEAD & NECK SURGERY       HYSTERECTOMY       HYSTERECTOMY       INCISION AND DRAINAGE ABDOMEN WASHOUT, COMBINED       INJECT EPIDURAL CERVICAL N/A 10/14/2021    Procedure: Cervical 7- Thoracic 1 epidural steroid injection with fluoroscopy;  Surgeon: Tram Florian MD;  Location: [facility] OR     INJECT EPIDURAL LUMBAR Right 09/15/2020    Procedure: Lumbar5- sacral 1 epidural steroid injection with fluoroscopy;  Surgeon: Tram Florian MD;  [facility]     INJECT EPIDURAL LUMBAR Right 06/29/2021    Procedure: Lumbar 5 sacral 1 epidural steroid injection with fluoroscopy;  Surgeon: Tram Florian MD;  Location: [facility] OR     INJECT SACROILIAC JOINT Bilateral 06/16/2020    Procedure: Bilateral sacroiliac joint steroid injection with fluoroscopy;  Surgeon: Tram Florian MD;  [facility]     LAMINECTOMY THORACIC ONE LEVEL N/A 08/19/2019    Procedure: LAMINECTOMY, SPINE, THORACIC, 11-12 and Part of Lumbar 1, DRAINAGE OF EPIDURAL ABCESS, Epidural Drain Placement X 2;  Surgeon: Yadiel Beal MD;  Location: [facility] OR     MO PERCUT IMPLNT NEUROELECT,EPIDURAL N/A 08/08/2019    Procedure: TRIAL, SPINAL CORD STIMULATOR WITH [employer];  Surgeon: Sipple, Daniel Peter, DO;  Location: [facility];  Service: Pain     RADIO FREQUENCY ABLATION / DESTRUCTION OF SACROILOAC JOINT DORSAL PRIMARY RAMUS Bilateral 12/17/2019    Procedure: Bilateral lumbar radiofrequency ablation with fluoroscopy and intravenous sedation ( Lumbar 2,3,4,5 medial branch nerves for the bilateral lumbar3-4, 4-5  and 5-sacral1 joints.;  Surgeon: Tram Florian MD;  [facility]     RADIO FREQUENCY ABLATION / DESTRUCTION OF SACROILOAC JOINT DORSAL PRIMARY RAMUS Right 11/17/2020    Procedure: Right lumbar medial branch nerve radiofrequency ablation right L2,3,4,5 nerves supplying the right L3-4, L4-5 and L5-S1 facet joints;  Surgeon: Tram Florian MD;  Location: [facility] OR     spinal cord stimulator  08/08/2019     spinal cord stimulator removal  08/13/2019       Allergies:  Allergies   Allergen Reactions     Bee Venom Anaphylaxis     Bees      Doxycycline Anaphylaxis     Patient thinks it may have been just nausea and vomiting, however unable to confirm     Erythromycin Anaphylaxis and Shortness Of Breath     Other reaction(s): Vomiting     Hydrocodone-Acetaminophen Itching       Meds:  Current Outpatient Medications   Medication     albuterol (PROAIR HFA, PROVENTIL HFA, VENTOLIN HFA) 108 (90 BASE) MCG/ACT inhaler     ARIPiprazole (ABILIFY) 15 MG tablet     busPIRone (BUSPAR) 15 MG tablet     busPIRone HCl (BUSPAR) 30 MG tablet     carboxymethylcellulose (CARBOXYMETHYLCELLULOSE SODIUM) 0.5 % SOLN ophthalmic solution     celecoxib (CELEBREX) 200 MG capsule     cetirizine (ZYRTEC) 10 MG tablet     cholecalciferol ( ULTRA STRENGTH) 2000 units CAPS     cholestyramine (QUESTRAN) 4 GM/DOSE powder     clotrimazole (MYCELEX) 10 MG lozenge     cyclobenzaprine (FLEXERIL) 10 MG tablet     dexamethasone AF (DECADRON) 0.1 MG/ML solution     DULoxetine (CYMBALTA) 60 MG capsule     EPINEPHrine (EPIPEN/ADRENACLICK/OR ANY BX GENERIC EQUIV) 0.3 MG/0.3ML injection 2-pack     ferrous sulfate (FEROSUL) 325 (65 Fe) MG tablet     fluconazole (DIFLUCAN) 100 MG tablet     folic acid (FOLVITE) 1 MG tablet     lisinopril-hydrochlorothiazide (ZESTORETIC) 20-25 MG tablet     methotrexate sodium 2.5 MG TABS     montelukast (SINGULAIR) 10 MG tablet     nystatin (MYCOSTATIN) 828980 UNIT/GM external cream     omeprazole (PRILOSEC) 40 MG DR capsule      "OXYGEN-HELIUM IN     pregabalin (LYRICA) 150 MG capsule     Respiratory Therapy Supplies ([facility] CPAP FILTER) MISC     rOPINIRole (REQUIP) 0.5 MG tablet     vitamin C 500 MG TABS     zinc sulfate (ZINCATE) 220 (50 Zn) MG capsule     Current Facility-Administered Medications   Medication     ropivacaine (NAROPIN) injection 1 mL     triamcinolone (KENALOG-40) injection 40 mg     Facility-Administered Medications Ordered in Other Visits   Medication     Lidocaine 1 % injection 0.5-5 mL     sodium chloride (PF) 0.9% PF flush 5-50 mL       Fam Hx:  Family History   Problem Relation Age of Onset     Other Cancer Father         base of tongue cancer at age ~65     Hypertension Father      Back Pain Father      Restless Leg Syndrome Father      Asthma Mother      Restless Leg Syndrome Mother      Restless Leg Syndrome Sister      Breast Cancer Maternal Aunt 55       P/S Hx:  Social History     Tobacco Use     Smoking status: Current Every Day Smoker     Packs/day: 0.50     Years: 30.00     Pack years: 15.00     Types: Cigarettes     Start date: 1/1/1996     Smokeless tobacco: Never Used     Tobacco comment: has tried the patch   Substance Use Topics     Alcohol use: No         Physical Exam:  Very pleasant, healthy appearing, alert, oriented x 3, cooperative.  Normal mood and affect.  Not in cardiorespiratory distress.  Ht 1.6 m (5' 3\")   Wt 101.2 kg (223 lb)   LMP  (LMP Unknown)   BMI 39.50 kg/m    Normal upright posture.    Normal gait without assistive device.  No antalgia / imbalance.  Back: no deformity, no skin lesions. Midline longitudinal incision at the midback, well healed  Localizes pain at lumbar region  Tenderness: (-) midline, (-) paraspinal  ROM:   Pain when flexing forward and extending  Cervical spine:    Appearance -no gross step-offs, kyphosis.    Motor -     C5: Deltoids R 5/5 and L 5/5 strength    C6: Biceps R 5/5 and L 5/5 strength     C7: Triceps R 5/5 and L 5/5 strength     C8:  R " 5/5 and L 5/5 strength     T1: Dorsal interossei R 4/5 and L 4/5 strength        Sensation: intact to light touch in C5-T1      Special Tests -      Spurling's Test - Negative        Lumbar Spine:    Appearance - No gross stepoffs or deformities    Motor -     L2-3: Hip flexion 5/5 R and 4/5 L strength          L3/4:  Knee extension R 5/5 and L 4/5 strength         L4/5:  Foot dorsiflexion R 5/5 L 4/5 and       EHL dorsiflexion R 4/5 L 4/5 strength         S1:  Plantarflexion/Peroneal Muscles  R 5/5 and L 4/5 strength    Sensation: intact to light touch L3-S1 distribution BLE      Neurologic:      REFLEXES Left Right   Patella 3+ 3+   Ankle jerk 2+ 2+   Babinski No upgoing great toe No upgoing great toe   Clonus 0 beats 0 beats     Hip Exam:  No pain with hip log roll and no tenderness over the greater trochanters.         Imaging:    EOS full spine ap-lat x-rays today show ankylosis T11-12 in kyphotic alignment, with resultant thoracolumbar kyphosis (T10-L2 = 24 deg kyphosis).  Multilevel lumbar spondylosis L1-S1, with disc space narrowing/collapse, and lumbar flatback deformity 2' to upper lumbar kyphosis.  Compensatory lower lumbar hyperlordosis.  Sagittal measurements:  LL 42, ideal 48.5  L4-S1 40, ideal 32  PI 41  PT 12  SVA +2.1cm  TPA 10  GT 13  T10-L2 kyph 24    Impression:    51/f RHD with:  Lumbar spine:  1.  Multilevel lumbar spondylosis with stenosis, L1-S1, with neurogenic claudication.  2.  Spinal sagittal malalignment with thoracolumbar kyphosis (T10-L2= 24 deg kyphosis) and lumbar flatback deformity (LL = 42, ideal 48.5).  3.  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  4.  Chronic active smoking x 35 yrs, currently 1 ppd.  5.  Class II obesity (BMI 39.50).  Cervical spine:  1.  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  2.  S/p ACDF C3-C5 (10/16/2017 Kayden).      Plan:     We discussed how patient does have cervical and lumbar pathology, however at  this time her lumbar symptoms are more pressing, although both are equally troublesome, so we focused on addressing her lumbar symptoms at this visit. Patient has exhausted all conservative measures and is no longer receiving adequate relief from epidural injections, and her stenosis continues to worsen. We discussed due to severity of multilevel degenerative changes and resultant stenosis, she may benefit from surgical intervention. Our recommended surgery would be a large multilevel posterior fusion spanning her lower thoracic spine to pelvis. However, prior to moving forward with surgery, we must try to optimize her health. Optimization involves smoking cessation, weight loss, meeting with PAC, and pre-habilitation with PM&R. We will also need to obtain DEXA scans to assess her bone density.     Briefly, regarding her cervical spine, we discussed with her that the fusion did not take and she does have broken hardware, which is likely causing her pain. Given she experienced significant relief for approximately 1 years after ACDF, she would likely benefit from re-attempt at fusion, however this would be a posterior approach and we would not attempt to take existing hardware out. Patient voices understanding of plan and importance of smoking cessation for proper healing after surgery.    - Dexa spine/hip/wrist  - CT lumbar and thoracic  - PM&R for pre-habilitation  - Weight management referral  - PAC referral  - Smoking cessation  - Obtain operative report from ACDF with Dr. Monique's     In-person RTC after above, with lumbar flex-ext x-rays.  If lower lumbar levels (L4-S1) revert to normal alignment on flexion x-ray, may perhaps consider shorter fusion without going down to pelvis.  I was initially thinking of T10-pelvis fusion.  However, if with good L4-S1 motion, may potentially stop at L4.    All questions and concerns were answered to the patient's apparent satisfaction before leaving the clinic.      Attestation:  I (Dr. Philip Wilhelm - Spine Surgeon) have personally evaluated patient with PGY-1 Kishan, and agree with findings and plan outlined in the note, which I also edited.  I discussed at length with the patient/family, explained the nature of spinal condition, and formulated workup and/or treatment plan together.  All questions were answered to the best of my ability and to patient's apparent satisfaction.    60 minutes spent on the date of the encounter doing chart review/review of outside records/review of test results/interpretation of tests/patient visit/documentation/discussion with other provider(s)/discussion with patient and family.      Philip Wilhelm MD    Orthopaedic Spine Surgery  Dept Orthopaedic Surgery, [facility]  [phone] office, [phone] pager  [email or web address]

## 2021-11-15 ENCOUNTER — VIRTUAL VISIT (OUTPATIENT)
Dept: PALLIATIVE MEDICINE | Facility: CLINIC | Age: 51
End: 2021-11-15
Payer: COMMERCIAL

## 2021-11-15 DIAGNOSIS — M54.16 LUMBAR RADICULOPATHY: ICD-10-CM

## 2021-11-15 DIAGNOSIS — M79.2 NEUROPATHIC PAIN: Primary | ICD-10-CM

## 2021-11-15 PROCEDURE — 96158 HLTH BHV IVNTJ INDIV 1ST 30: CPT | Mod: 95 | Performed by: PSYCHOLOGIST

## 2021-11-15 NOTE — PROGRESS NOTES
"Zayra Ramirez is a 51 year old female who is being evaluated via a billable video visit.      The patient has been notified of following:     \"This video visit will be conducted via a call between you and your physician/provider. We have found that certain health care needs can be provided without the need for an in-person physical exam.  This service lets us provide the care you need with a video conversation.     Video visits are billed at different rates depending on your insurance coverage.  Please reach out to your insurance provider with any questions.    If during the course of the call the physician/provider feels a video visit is not appropriate, you will not be charged for this service.\"    Patient has given verbal consent for Video visit? Yes    Patient would like the video invitation sent by: Text to cell phone: .[phone]}    Video Start Time: 3:00 PM    Additional provider notes:      Pain Diagnoses per pain provider:   Neuropathic pain      Lumbar radiculopathy        DATA: During today's visit you reported the following: Your pain is 'tolerable'. Your mood is 'ok'. Your activity level is greatly reduced, only engaged in getting 'house in order'. Your stress level is 'normal - seasonal depression'. Your sleep is unchanged. You reported engaging in self-care for your pain 1-2 times daily.    You identified that you would like to focus on the following or had questions regarding the following issues or concerns, and we discussed the following:   - discussed no show and importance of preference for 24 hours notice to cancel appointments, and that frequent no shows will result in not being able to access pain psychology services  - sleep hygiene tips  - met with orthopedic surgeon who requires complete smoking cessation for 6 months - on day 3 'cold turkey'  - slowly build in more physical activity  - using full spectrum light about every other day - only obtained last year  - prescribed 20-30 minutes of full " spectrum light - struggling to get it in  - currently on waitlist for [facility]  - report issues with audio on your end, no issues noted with either audio or video on provider's end    ASSESSMENT: Oriented to follow up although will likely re-orient to ensure good understanding due to patient reporting issues with audio, which also resulted in ending session earlier than usual. Patient would likely benefit from regular use of full spectrum light as directed by psychiatrist.     PLAN:   Your next appointment is scheduled for 12/6 at 3:00 PM.  Assignment/Objectives /interventions for next session:   - use full spectrum light daily as directed by psychiatrist - try setting alarm on your phone or an appointment reminder    Video-Visit Details    Type of service:  Video Visit    Video End Time (time video stopped): 3:25 PM    Originating Location (pt. Location): Home    Distant Location (provider location):  [facility]     Mode of Communication:  Video Conference via Larisa Morin PsyD LP  Licensed Psychologist  Outpatient Clinic Therapist  [facility]

## 2021-11-16 ENCOUNTER — MYC MEDICAL ADVICE (OUTPATIENT)
Dept: SLEEP MEDICINE | Facility: CLINIC | Age: 51
End: 2021-11-16
Payer: COMMERCIAL

## 2021-11-16 DIAGNOSIS — G25.81 RESTLESS LEG SYNDROME: ICD-10-CM

## 2021-11-16 RX ORDER — PREGABALIN 150 MG/1
150 CAPSULE ORAL 2 TIMES DAILY
Qty: 60 CAPSULE | Refills: 0 | Status: SHIPPED | OUTPATIENT
Start: 2021-11-16 | End: 2022-01-21

## 2021-11-16 NOTE — PROGRESS NOTES
An auto refill request was received from her pharmacy for pregabalin. Her last prescription for that was written in May and had 3 refills. She should have been out of that in September. The refill request noted that she last filled it on 10/14. I have written a MyChart note asking her how she is taking it and how it is working. I did send in a month refill just to make sure she is covered until I hear back from her.  Bennett Goltz, PA-C

## 2021-11-23 RX ORDER — IBUPROFEN 200 MG
400 TABLET ORAL EVERY 8 HOURS PRN
COMMUNITY
End: 2022-01-17

## 2021-11-23 NOTE — PROGRESS NOTES
Preoperative Assessment Center Medication History Note    Medication history completed on November 23, 2021 by this writer. See Epic admission navigator for prior to admission medications. Operating room staff will still need to confirm medications and last dose information on day of surgery.     Medication history interview sources  Patient interview: Yes  Care Everywhere records: Yes  Surescripts pharmacy refill records: Yes  Other (if applicable):     Changes made to PTA medication list  Added: ibuprofen  Deleted: abilify, clotrimazole nakul (completed), decadron swish/spit, fluconazole (completed),   Changed: buspar dose/sig, nystatin cream,     Additional medication history information (including reliability of information, actions taken by pharmacist):    -- No recent (within 30 days) course of antibiotics  -- No recent (within 30 days) course of systemic steroids  -- Patient declines being on any other prescription or over-the-counter medications    Prior to Admission medications    Medication Sig Last Dose Taking? Auth Provider   albuterol (PROAIR HFA, PROVENTIL HFA, VENTOLIN HFA) 108 (90 BASE) MCG/ACT inhaler Inhale 2 puffs into the lungs every 6 hours as needed for shortness of breath / dyspnea or wheezing (PT last dose 1.23.2020)  Taking Yes Reported, Patient   busPIRone (BUSPAR) 15 MG tablet Take 15 mg by mouth every morning Also takes 30 mg at bedtime - see other order. Taking Yes Reported, Patient   busPIRone HCl (BUSPAR) 30 MG tablet Take 30 mg by mouth At Bedtime Also takes 15 mg in the morning - see other order. Taking Yes Reported, Patient   carboxymethylcellulose (CARBOXYMETHYLCELLULOSE SODIUM) 0.5 % SOLN ophthalmic solution Place 1 drop into both eyes 3 times daily as needed for dry eyes Taking Yes Leodan Gan MD   celecoxib (CELEBREX) 200 MG capsule Take 1 capsule (200 mg) by mouth every morning Taking Yes Neena Tam APRN CNP   cetirizine (ZYRTEC) 10 MG tablet Take 1 tablet (10 mg) by  mouth daily Taking Yes Neena Tam APRN CNP   cholecalciferol ( ULTRA STRENGTH) 2000 units CAPS Take 2,000 Units by mouth every evening  Taking Yes Reported, Patient   cholestyramine (QUESTRAN) 4 GM/DOSE powder Take 4 grams once per day at least 2 hours after and at lease 2 hours before eating or taking medications. Taking Yes Trent Rahman MD   cyclobenzaprine (FLEXERIL) 10 MG tablet Take 1 tablet (10 mg) by mouth daily  Patient taking differently: Take 10 mg by mouth At Bedtime  Taking Yes Neena Tam APRN CNP   DULoxetine (CYMBALTA) 60 MG capsule Take 120 mg by mouth At Bedtime  Taking Yes Unknown, Entered By History   EPINEPHrine (EPIPEN/ADRENACLICK/OR ANY BX GENERIC EQUIV) 0.3 MG/0.3ML injection 2-pack INJECT 0.3ML INTO THE MUSCLE ONCE AS NEEDED FOR ANAPHYLAXIS Taking Yes Reported, Patient   folic acid (FOLVITE) 1 MG tablet Take 1 tablet (1 mg) by mouth daily Taking Yes Panchito Saenz MD   ibuprofen (ADVIL/MOTRIN) 200 MG tablet Take 400 mg by mouth every 8 hours as needed for mild pain Taking Yes Unknown, Entered By History   lisinopril-hydrochlorothiazide (ZESTORETIC) 20-25 MG tablet Take 1 tablet by mouth daily  Patient taking differently: Take 1 tablet by mouth every evening  Taking Yes Francoise Chew MD   methotrexate sodium 2.5 MG TABS TAKE 9 TABLETS BY MOUTH ONCE WEEKLY  Labs  past due. appt past due.  Patient taking differently: TAKE 9 TABLETS BY MOUTH ONCE WEEKLY  Labs  past due. appt past due.  Takes on Mondays. Taking Yes Panchito Saenz MD   montelukast (SINGULAIR) 10 MG tablet Take 10 mg by mouth At Bedtime Taking Yes Unknown, Entered By History   nystatin (MYCOSTATIN) 101763 UNIT/GM external cream Apply topically 2 times daily  Patient taking differently: Apply topically daily as needed  Taking Yes Neena Tam APRN CNP   omeprazole (PRILOSEC) 40 MG DR capsule Take 1 capsule (40 mg) by mouth daily  Patient taking differently: Take 40 mg by mouth every  evening  Taking Yes Abhi Villafuerte PA-C   OXYGEN-HELIUM IN 2-3L PRN during the day, 3L @ night    Oxygen only not helium Taking Yes Reported, Patient   pregabalin (LYRICA) 150 MG capsule Take 1 capsule (150 mg) by mouth 2 times daily Taking Yes Goltz, Bennett Ezra, PA-C   rOPINIRole (REQUIP) 0.5 MG tablet Take 1 tablet (0.5 mg) by mouth At Bedtime Take 1 tab by mouth at bedtime. Taking Yes Goltz, Bennett Ezra, PA-C   vitamin C 500 MG TABS Take 500 mg by mouth daily (with lunch)  Taking Yes Shara Negrete APRN CNP   zinc sulfate (ZINCATE) 220 (50 Zn) MG capsule Take 220 mg by mouth daily (with lunch)  Taking Yes Shara Negrete APRN CNP   ferrous sulfate (FEROSUL) 325 (65 Fe) MG tablet Take 325 mg by mouth daily (with breakfast)   Patient not taking: Reported on 11/23/2021 Not Taking  Reported, Patient   Respiratory Therapy Supplies ([facility] CPAP FILTER) MISC    Reported, Patient          Medication history completed by: Agusto Thomas [facility]

## 2021-11-24 ENCOUNTER — PRE VISIT (OUTPATIENT)
Dept: SURGERY | Facility: CLINIC | Age: 51
End: 2021-11-24

## 2021-11-24 ENCOUNTER — ANCILLARY PROCEDURE (OUTPATIENT)
Dept: BONE DENSITY | Facility: CLINIC | Age: 51
End: 2021-11-24
Payer: COMMERCIAL

## 2021-11-24 ENCOUNTER — ANESTHESIA EVENT (OUTPATIENT)
Dept: SURGERY | Facility: CLINIC | Age: 51
End: 2021-11-24

## 2021-11-24 ENCOUNTER — ANCILLARY PROCEDURE (OUTPATIENT)
Dept: CT IMAGING | Facility: CLINIC | Age: 51
End: 2021-11-24
Payer: COMMERCIAL

## 2021-11-24 ENCOUNTER — OFFICE VISIT (OUTPATIENT)
Dept: SURGERY | Facility: CLINIC | Age: 51
End: 2021-11-24
Payer: COMMERCIAL

## 2021-11-24 VITALS
DIASTOLIC BLOOD PRESSURE: 85 MMHG | WEIGHT: 220.7 LBS | OXYGEN SATURATION: 97 % | TEMPERATURE: 98.6 F | HEART RATE: 88 BPM | BODY MASS INDEX: 37.68 KG/M2 | HEIGHT: 64 IN | RESPIRATION RATE: 16 BRPM | SYSTOLIC BLOOD PRESSURE: 125 MMHG

## 2021-11-24 DIAGNOSIS — M54.16 LUMBAR RADICULOPATHY: ICD-10-CM

## 2021-11-24 DIAGNOSIS — M81.0 AGE-RELATED OSTEOPOROSIS WITHOUT CURRENT PATHOLOGICAL FRACTURE: ICD-10-CM

## 2021-11-24 PROCEDURE — 77080 DXA BONE DENSITY AXIAL: CPT | Performed by: INTERNAL MEDICINE

## 2021-11-24 PROCEDURE — 72131 CT LUMBAR SPINE W/O DYE: CPT | Mod: GC | Performed by: STUDENT IN AN ORGANIZED HEALTH CARE EDUCATION/TRAINING PROGRAM

## 2021-11-24 PROCEDURE — 72128 CT CHEST SPINE W/O DYE: CPT | Mod: GC | Performed by: STUDENT IN AN ORGANIZED HEALTH CARE EDUCATION/TRAINING PROGRAM

## 2021-11-24 PROCEDURE — 99204 OFFICE O/P NEW MOD 45 MIN: CPT | Performed by: PHYSICIAN ASSISTANT

## 2021-11-24 PROCEDURE — 77081 DXA BONE DENSITY APPENDICULR: CPT | Mod: XU | Performed by: INTERNAL MEDICINE

## 2021-11-24 ASSESSMENT — LIFESTYLE VARIABLES: TOBACCO_USE: 1

## 2021-11-24 ASSESSMENT — PAIN SCALES - GENERAL: PAINLEVEL: MILD PAIN (3)

## 2021-11-24 ASSESSMENT — COPD QUESTIONNAIRES
CAT_SEVERITY: MODERATE
COPD: 1

## 2021-11-24 ASSESSMENT — ENCOUNTER SYMPTOMS: SEIZURES: 0

## 2021-11-24 ASSESSMENT — MIFFLIN-ST. JEOR: SCORE: 1598.15

## 2021-11-24 NOTE — H&P
Pre-Operative Anesthesia Risk Evaluation    CC:  Preoperative exam to assess for increased cardiopulmonary risk while undergoing surgery and anesthesia.    Date of Encounter: 11/24/2021  Primary Care Physician:  Neena Tam     Reason for visit:   Encounter Diagnosis   Name Primary?      * Lumbar radiculopathy  Multilevel lumbar spondylosis with stenosis, with neurogenic claudication         HPI  Zayra Ramirez is a 52 y/o female who presents for pre-operative anesthesia risk assessment in preparation for a complex spinal fusion with Philip Wilhelm MD for treatment of Multilevel lumbar spondylosis with stenosis, with neurogenic claudication. Patient is being evaluated for comorbid conditions of HTN, ROBERT w/ CPAP w/ supplemental O2, ILD, tobacco dependence, anxiety, depression, PTSD, RLS, neuropathy, borderline personality, morbid obesity, GERD, TMJ syndrome, rheumatoid arthritis, chronic immunosuppression.     Ms. Ramirez has a history of cervical and lumbar pathology. Currently, her lumbar symptoms are more pressing. She has had several spine surgeries (see below). She has exhausted all conservative measures and is no longer receiving adequate relief from epidural injections, and her stenosis continues to worsen. Recommended surgery would be a large multilevel posterior fusion spanning her lower thoracic spine to pelvis. She has been counseled on the need for smoking cessation, weight loss, and pre-habilitation with PM&R. A DEXA scan to assess her bone density is also recommended.     Spine Surgical Hx:   10/16/2017 - ACDF with plate fixation C3-C5 (2 levels); use of [employer] allograft (Dr. Rylan Monique, [facility]).  [Implants: Medtr[employer] Zevo plate].   08/08/2019 - SCS implantation ([place] OR); complicated by epidural abscess MSSA culture positive, treated with drainage/laminectomy and IV antibiotics   80/19/2019 - Laminectomies T11-L1 ([facility]).     She was last seen in pulmonology by [facility]  MD Chad @ [facility] on 11/20/20. Her presentation is most consistent with respiratory bronchiolitis, the process appears to have been going on since at least 11/2015, no improvement with steroids. Her CT CHEST 7/29/2016 is most consistent with RB-ILD. CT did not suggest presence of connective tissue disease related interstitial lung disease. Her asthma, longstanding, appears to be chronic obstructive with significant air trapping. She continues to smoke tobacco. She has obstructive sleep apnea, CPAP at 14 cm of water pressure with supplemental oxygen at 2-3 lpm. She was referred for smoking cessation. Return to clinic in 1 year.    She was last seen in rheumatology by Panchito Saenz MD on 3/12/21. Inflammatory arthritis is overall improved on methotrexate monotherapy.     History was obtained from patient & chart review.     Hx of abnormal bleeding or anti-platelet use: denies    Menstrual history: No LMP recorded (lmp unknown). Patient has had a hysterectomy.:      Prior to Admission Medications  Current Outpatient Medications   Medication Sig Dispense Refill     albuterol (PROAIR HFA, PROVENTIL HFA, VENTOLIN HFA) 108 (90 BASE) MCG/ACT inhaler Inhale 2 puffs into the lungs every 6 hours as needed for shortness of breath / dyspnea or wheezing (PT last dose 1.23.2020)        busPIRone (BUSPAR) 15 MG tablet Take 15 mg by mouth every morning Also takes 30 mg at bedtime - see other order.       busPIRone HCl (BUSPAR) 30 MG tablet Take 30 mg by mouth At Bedtime Also takes 15 mg in the morning - see other order.       carboxymethylcellulose (CARBOXYMETHYLCELLULOSE SODIUM) 0.5 % SOLN ophthalmic solution Place 1 drop into both eyes 3 times daily as needed for dry eyes 15 mL 11     celecoxib (CELEBREX) 200 MG capsule Take 1 capsule (200 mg) by mouth every morning 60 capsule 4     cetirizine (ZYRTEC) 10 MG tablet Take 1 tablet (10 mg) by mouth daily 69 tablet 3     cholecalciferol ( ULTRA STRENGTH) 2000 units CAPS Take  2,000 Units by mouth every evening        cholestyramine (QUESTRAN) 4 GM/DOSE powder Take 4 grams once per day at least 2 hours after and at lease 2 hours before eating or taking medications. 378 g 0     cyclobenzaprine (FLEXERIL) 10 MG tablet Take 1 tablet (10 mg) by mouth daily (Patient taking differently: Take 10 mg by mouth At Bedtime ) 90 tablet 1     DULoxetine (CYMBALTA) 60 MG capsule Take 120 mg by mouth At Bedtime        EPINEPHrine (EPIPEN/ADRENACLICK/OR ANY BX GENERIC EQUIV) 0.3 MG/0.3ML injection 2-pack INJECT 0.3ML INTO THE MUSCLE ONCE AS NEEDED FOR ANAPHYLAXIS       folic acid (FOLVITE) 1 MG tablet Take 1 tablet (1 mg) by mouth daily 90 tablet 3     ibuprofen (ADVIL/MOTRIN) 200 MG tablet Take 400 mg by mouth every 8 hours as needed for mild pain       lisinopril-hydrochlorothiazide (ZESTORETIC) 20-25 MG tablet Take 1 tablet by mouth daily (Patient taking differently: Take 1 tablet by mouth every evening ) 90 tablet 3     methotrexate sodium 2.5 MG TABS TAKE 9 TABLETS BY MOUTH ONCE WEEKLY  Labs  past due. appt past due. (Patient taking differently: TAKE 9 TABLETS BY MOUTH ONCE WEEKLY  Labs  past due. appt past due.  Takes on Mondays.) 108 tablet 5     montelukast (SINGULAIR) 10 MG tablet Take 10 mg by mouth At Bedtime       nystatin (MYCOSTATIN) 227002 UNIT/GM external cream Apply topically 2 times daily (Patient taking differently: Apply topically daily as needed ) 30 g 3     omeprazole (PRILOSEC) 40 MG DR capsule Take 1 capsule (40 mg) by mouth daily (Patient taking differently: Take 40 mg by mouth every evening ) 180 capsule 3     OXYGEN-HELIUM IN 2-3L PRN during the day, 3L @ night    Oxygen only not helium       pregabalin (LYRICA) 150 MG capsule Take 1 capsule (150 mg) by mouth 2 times daily 60 capsule 0     rOPINIRole (REQUIP) 0.5 MG tablet Take 1 tablet (0.5 mg) by mouth At Bedtime Take 1 tab by mouth at bedtime. 90 tablet 1     vitamin C 500 MG TABS Take 500 mg by mouth daily (with lunch)         zinc sulfate (ZINCATE) 220 (50 Zn) MG capsule Take 220 mg by mouth daily (with lunch)        ferrous sulfate (FEROSUL) 325 (65 Fe) MG tablet Take 325 mg by mouth daily (with breakfast)  (Patient not taking: Reported on 2021)  0     Respiratory Therapy Supplies (CARETOUCH UNIVERSL CPAP FILTER) MISC          Family History  Family History   Problem Relation Age of Onset     Asthma Mother      Restless Leg Syndrome Mother      Other Cancer Father         base of tongue cancer at age ~65     Hypertension Father      Back Pain Father      Restless Leg Syndrome Father      Coronary Artery Disease Father      Restless Leg Syndrome Sister      Breast Cancer Maternal Aunt 55     Anesthesia Reaction No family hx of      Deep Vein Thrombosis (DVT) No family hx of        The complete review of systems is negative other than noted in the HPI or here.       Anesthesia Pre-Procedure Evaluation    Patient: Zayra Ramirez   MRN: [id number] : 1970        Preoperative Diagnosis: * No surgery found *    Procedure :   PAC EVALUATION       Past Medical History:   Diagnosis Date     Allergic rhinitis      Anemia      Arthritis      Asthma     copd     Dental abscess      Depressive disorder      Gastroesophageal reflux disease      History of emphysema      Hoarseness      Hypertension      Morbid obesity with BMI of 40.0-44.9, adult (H)      Obstructive sleep apnea      Other chronic pain      Respiratory bronchiolitis interstitial lung disease (H)      Sleep apnea       Past Surgical History:   Procedure Laterality Date     CERVICAL FUSION       COLONOSCOPY       COLONOSCOPY N/A 2020    Procedure: COLONOSCOPY, WITH POLYPECTOMY AND BIOPSY;  Surgeon: Julian Mccullough MD;  Location:  GI     ENT SURGERY       ESOPHAGOSCOPY, GASTROSCOPY, DUODENOSCOPY (EGD), COMBINED N/A 2020    Procedure: ESOPHAGOGASTRODUODENOSCOPY (EGD);  Surgeon: Julian Mccullough MD;  Location:  GI     EXCISE LESION  INTRAORAL Bilateral 10/03/2018    Procedure: EXCISE LESION INTRAORAL;  Wide Local Excision Of of Left Oral Cavity Ulcer;  Surgeon: Morro Mijares MD;  Location: [facility] OR     HC DRAIN SKIN ABSCESS SIMPLE/SINGLE  03/16/2012    Procedure:INCISION AND DRAINAGE, ABSCESS, SIMPLE; Surgeon:CHRISTIANO HANCOCK; Location: GI     HEAD & NECK SURGERY       HYSTERECTOMY       HYSTERECTOMY       INCISION AND DRAINAGE ABDOMEN WASHOUT, COMBINED       INJECT EPIDURAL CERVICAL N/A 10/14/2021    Procedure: Cervical 7- Thoracic 1 epidural steroid injection with fluoroscopy;  Surgeon: Tram Florian MD;  Location: [facility] OR     INJECT EPIDURAL LUMBAR Right 09/15/2020    Procedure: Lumbar5- sacral 1 epidural steroid injection with fluoroscopy;  Surgeon: Tram Florian MD;  [facility]     INJECT EPIDURAL LUMBAR Right 06/29/2021    Procedure: Lumbar 5 sacral 1 epidural steroid injection with fluoroscopy;  Surgeon: Tram Florian MD;  Location: [facility] OR     INJECT SACROILIAC JOINT Bilateral 06/16/2020    Procedure: Bilateral sacroiliac joint steroid injection with fluoroscopy;  Surgeon: Tram Florian MD;  [facility]     LAMINECTOMY THORACIC ONE LEVEL N/A 08/19/2019    Procedure: LAMINECTOMY, SPINE, THORACIC, 11-12 and Part of Lumbar 1, DRAINAGE OF EPIDURAL ABCESS, Epidural Drain Placement X 2;  Surgeon: Yadiel Beal MD;  Location: [facility] OR     AZ PERCUT IMPLNT NEUROELECT,EPIDURAL N/A 08/08/2019    Procedure: TRIAL, SPINAL CORD STIMULATOR WITH BOSTON SCIENTIFIC;  Surgeon: Sipple, Daniel Peter, DO;  Location: [facility];  Service: Pain     RADIO FREQUENCY ABLATION / DESTRUCTION OF SACROILOAC JOINT DORSAL PRIMARY RAMUS Bilateral 12/17/2019    Procedure: Bilateral lumbar radiofrequency ablation with fluoroscopy and intravenous sedation ( Lumbar 2,3,4,5 medial branch nerves for the bilateral lumbar3-4, 4-5 and 5-sacral1 joints.;  Surgeon: Tram Florian MD;  [facility]     RADIO FREQUENCY ABLATION /  DESTRUCTION OF SACROILOAC JOINT DORSAL PRIMARY RAMUS Right 2020    Procedure: Right lumbar medial branch nerve radiofrequency ablation right L2,3,4,5 nerves supplying the right L3-4, L4-5 and L5-S1 facet joints;  Surgeon: Tram Florian MD;  Location: [facility] OR     spinal cord stimulator  2019     spinal cord stimulator removal  2019      Allergies   Allergen Reactions     Bee Venom Anaphylaxis     Bees      Doxycycline Anaphylaxis     Patient thinks it may have been just nausea and vomiting, however unable to confirm     Erythromycin Anaphylaxis and Shortness Of Breath     Other reaction(s): Vomiting     Hydrocodone-Acetaminophen Itching      Social History     Tobacco Use     Smoking status: Former Smoker     Packs/day: 1.00     Years: 30.00     Pack years: 30.00     Types: Cigarettes     Start date: 1996     Quit date: 2021     Years since quittin.0     Smokeless tobacco: Never Used   Substance Use Topics     Alcohol use: No      Wt Readings from Last 1 Encounters:   21 100.1 kg (220 lb 11.2 oz)            ROS/MED HX  The complete review of systems is negative other than noted in the HPI or here.  Patient denies recent illness, fever and respiratory infection during past month.    ENT/Pulmonary: Comment: 30 pk yr hx, quit 21    Uses albuterol daily. No maintenance inhaler.    Asthma, longstanding, appears to be chronic obstructive with significant air trapping. Followed in pulmonology @ [facility], last seen 2020.    PFT 2020: Severe obstructive ventilatory defect with gas trapping and  preserved diffusion capacity is most consistent with chronic  obstructive asthma        (+) sleep apnea, uses CPAP, tobacco use, Past use, Moderate Persistent, asthma Treatment: Inhaler daily,  moderate,  COPD, O2 dependent, during Nighttime, 3L liters/min,     Neurologic: Comment: RLS    (+) peripheral neuropathy, - 2/2 cervical & lumbar radiculopathy.  (-) no seizures, no CVA and  "migraines   Cardiovascular:     (+) hypertension-----Previous cardiac testing   Echo: Date: Results:    Stress Test: Date: Results:    ECG Reviewed: Date: 8/31/21 Results:  Sinus rhythm  Normal ECG   Ventricular rate 64 bpm     Cath: Date: Results:      METS/Exercise Tolerance: 1 - Eating, dressing Comment: Unable to walk >1 block due to back pain   Hematologic:  - neg hematologic  ROS  (-) history of blood clots and history of blood transfusion   Musculoskeletal: Comment: S/P multiple spine surgeries    Chronic shoulder pain    Rheumatoid arthritis, followed by Panchito Saenz MD. On methotrexate.        GI/Hepatic:     (+) GERD, Asymptomatic on medication,  (-) liver disease   Renal/Genitourinary:  - neg Renal ROS  (-) renal disease   Endo: Comment: Had steroid injection C-spine 2 months ago    (+) Obesity,  (-) thyroid disease   Psychiatric/Substance Use: Comment: PTSD      (+) psychiatric history anxiety and depression     Infectious Disease:  - neg infectious disease ROS     Malignancy:  - neg malignancy ROS     Other: Comment: Has seen Dr. Florian in pain clinic.     (+) , H/O Chronic Pain,        Physical Exam    Airway        Mallampati: III   TM distance: > 3 FB   Neck ROM: limited   Mouth opening: > 3 cm    Respiratory Devices and Support         Dental     Comment: Fair dentition, missing 2 lower right molars    (+) missing      Cardiovascular   cardiovascular exam normal          Pulmonary       Comment: Distant BS, minimal inspiratory/expiratory cycle    breath sounds clear to auscultation   (+) decreased breath sounds        PAC Discussion and Assessment  ASA Classification: 3          /85 (BP Location: Right arm, Patient Position: Chair, Cuff Size: Adult Large)   Pulse 88   Temp 98.6  F (37  C) (Oral)   Resp 16   Ht 1.613 m (5' 3.5\")   Wt 100.1 kg (220 lb 11.2 oz)   LMP  (LMP Unknown)   SpO2 97%   Breastfeeding No   BMI 38.48 kg/m           Physical Exam  Constitutional: Awake, alert, " cooperative, no apparent distress, and appears stated age.  Eyes: Pupils equal, round and reactive to light, extra ocular muscles intact, sclera clear, conjunctiva normal.  HENT: Normocephalic, oral pharynx with moist mucus membranes, fair dentition, missing 2 lower right molars. No goiter appreciated. No removable dental hardware.  Respiratory: Distant BS bilaterally, no crackles or wheezing. Minimal inspiratory/expiratory cycle. No SOB when supine.  Cardiovascular: Regular rate and rhythm, normal S1 and S2, and no murmur noted.  Carotids +2, no bruits. No edema. Palpable pulses to radial, DP and PT arteries.   GI: Distant bowel sounds, soft, obese, non-tender, no masses palpated. Exam limited due to body habitus.    Lymph/Hematologic: No cervical lymphadenopathy and no supraclavicular lymphadenopathy.  Genitourinary:  deferred  Skin: Warm and dry.  No rashes.   Musculoskeletal: Limited ROM of neck. There is no redness, warmth, or swelling of the joints. Gross motor strength is diminished in LLE.    Neurologic: Awake, alert, oriented to name, place and time. Cranial nerves II-XII are grossly intact. Voice hoarse. Gait is antalgic. Ambulates from chair to exam table, seats self, lies supine and sits back up w/o assistance. Demonstrates pain when changing from sitting to supine & up again.  Neuropsychiatric: Calm, cooperative. Normal affect. Pleasant. Answers questions appropriately, follows commands w/o difficulty.    PRIOR LABS/DIAGNOSTIC STUDIES:    All labs and imaging personally reviewed      CBC:   Lab Results   Component Value Date    WBC 7.5 11/10/2021    WBC 10.7 08/31/2021    HGB 12.5 11/10/2021    HGB 13.2 08/31/2021    HCT 39.8 11/10/2021    HCT 39.9 08/31/2021     11/10/2021     08/31/2021     BMP:   Lab Results   Component Value Date     08/31/2021     04/08/2021    POTASSIUM 3.2 (L) 08/31/2021    POTASSIUM 3.4 04/08/2021    CHLORIDE 100 08/31/2021    CHLORIDE 102 04/08/2021     CO2 31 08/31/2021    CO2 34 (H) 04/08/2021    BUN 23 08/31/2021    BUN 12 04/08/2021    CR 0.99 11/10/2021    CR 1.36 (H) 08/31/2021    GLC 86 08/31/2021    GLC 94 04/08/2021     COAGS:   Lab Results   Component Value Date    PTT 37 08/19/2019    INR 1.09 08/18/2019    FIBR 842 (H) 08/18/2019     POC:   Lab Results   Component Value Date    BGM 90 10/03/2018    HCG Negative 11/28/2010     HEPATIC:   Lab Results   Component Value Date    ALBUMIN 3.1 (L) 11/10/2021    PROTTOTAL 7.4 08/31/2021    ALT 19 11/10/2021    AST 11 11/10/2021    ALKPHOS 76 08/31/2021    BILITOTAL 0.4 08/31/2021     OTHER:   Lab Results   Component Value Date    LACT 2.6 (H) 04/04/2018    A1C 5.8 (H) 04/08/2021    NATALIYA 8.7 08/31/2021    PHOS 3.0 09/23/2019    MAG 1.9 09/23/2019    CRP 24.0 (H) 11/10/2021    SED 14 02/26/2020       XR SPINE COMPLETE SCOLIOSIS 2 VW, XR LEG LENGTH EVALUATION, 11/11/2021   Impression:   1. Mild left convexed curvature of the thoracolumbar/lumbar spine.    2. No  global sagittal imbalance.   3. Weight bearing axis as noted above.   4. Postsurgical changes at T12-L2. In the cervical spine.         PFT 11/20/20  IMPRESSION:  Severe obstruction is present  Volume loop with expiratory flow limitation  Gas trapping is present  Diffusion is normal    CONCLUSION:  Severe obstructive ventilatory defect with gas trapping and  preserved diffusion capacity is most consistent with chronic  obstructive asthma             EKG 8/31/21   Sinus rhythm  Normal ECG   Ventricular rate 64 bpm         The patient's records and results personally reviewed by this provider.     Outside records reviewed from: Care Everywhere (provider notes & PFT @ [facility])          ASSESSMENT and PLAN  Zayra Ramirez is a 50 y/o female who presents for pre-operative anesthesia risk assessment in preparation for a complex spinal fusion with Philip Wilhelm MD for treatment of Multilevel lumbar spondylosis with stenosis, with neurogenic claudication    Pt  has had prior anesthetic.  No history of anesthetic complications.       She has the following specific operative considerations:   # VTE risk: 0.5%  # Risk of PONV score = 2.  If > 2, anti-emetic intervention recommended.  # Anesthesia considerations:  Refer to PAC assessment in anesthesia records      CARDIAC: METS 1 -  Unable to walk >1 block due to back pain     # RCRI : High risk surgery.  0.9% risk of major adverse cardiac event.     #  EKG 8/31/21:  NSR      PULMONARY:     # ROBERT w/ CPAP, uses 3L O2 in sleep    # Former smoker, 30 pk yr hx, quit 11/14/21    # Asthma, longstanding, appears to be chronic obstructive with significant air trapping. Followed in pulmonology @ [facility], last seen 11/2020.    # Uses albuterol daily. No maintenance inhaler. Patient will follow up with her pulmonologist to discuss transitioning to a daily maintenance inhaler.    # PFT 11/2020: Severe obstructive ventilatory defect with gas trapping and preserved diffusion capacity is most consistent with chronic obstructive asthma      ENT:    # Hx prolonged thrush, resolved. Pt working w/ ENT for residual dysphonia.    GI:     # GERD, asymptomatic on prilosec       /RENAL:     # Creatinine 0.99, GFR 67 on 11/10/21    ENDO: BMI 38    # Pt has been referred to weight loss clinic by Dr. Wilhelm. PAC clinic coordinator will call pt with contact info to arrange appt.    # No DM    HEMEIMMUNE:     # On methotrexate for RA    # On ferrous sulfate for iron deficiency    ORTHO:     # Limited ROM of neck 2/2 pain    # S/P multiple spine surgeries (see HPI)    # Rheumatoid arthritis, followed by Panchito Saenz MD. Pt has follow up appt on 12/17/21.    # Chronic shoulder pain    # Pt referred to pre-habilitation with PM&R.by Dr. Wilhelm. Has appt on 11/27/21.    # DEXA scan scheduled for today.    Patient was discussed with Dr Kelley.        On the day of service:     Prep time: 18 minutes  Visit time: 25 minutes  Documentation time: 15  minutes  ------------------------------------------  Total time: 58 minutes      Mai Sotomayor PA-C  Preoperative Assessment Center  [facility]  Phone: [phone]  Fax: [phone]

## 2021-11-24 NOTE — ANESTHESIA PREPROCEDURE EVALUATION
Anesthesia Pre-Procedure Evaluation    Patient: Zayra Ramirez   MRN: [id number] : 1970        Preoperative Diagnosis: * No surgery found *    Procedure :   PAC EVALUATION       Past Medical History:   Diagnosis Date     Allergic rhinitis      Anemia      Arthritis      Asthma     copd     Dental abscess      Depressive disorder      Gastroesophageal reflux disease      History of emphysema      Hoarseness      Hypertension      Morbid obesity with BMI of 40.0-44.9, adult (H)      Obstructive sleep apnea      Other chronic pain      Respiratory bronchiolitis interstitial lung disease (H)      Sleep apnea       Past Surgical History:   Procedure Laterality Date     CERVICAL FUSION       COLONOSCOPY       COLONOSCOPY N/A 2020    Procedure: COLONOSCOPY, WITH POLYPECTOMY AND BIOPSY;  Surgeon: Julian Mccullough MD;  [facility]     ENT SURGERY       ESOPHAGOSCOPY, GASTROSCOPY, DUODENOSCOPY (EGD), COMBINED N/A 2020    Procedure: ESOPHAGOGASTRODUODENOSCOPY (EGD);  Surgeon: Julian Mccullough MD;  Location:  GI     EXCISE LESION INTRAORAL Bilateral 10/03/2018    Procedure: EXCISE LESION INTRAORAL;  Wide Local Excision Of of Left Oral Cavity Ulcer;  Surgeon: Morro Mijares MD;  Location: [facility]     HC DRAIN SKIN ABSCESS SIMPLE/SINGLE  2012    Procedure:INCISION AND DRAINAGE, ABSCESS, SIMPLE; Surgeon:CHRISTIANO HANCOCK; Location: GI     HEAD & NECK SURGERY       HYSTERECTOMY       HYSTERECTOMY       INCISION AND DRAINAGE ABDOMEN WASHOUT, COMBINED       INJECT EPIDURAL CERVICAL N/A 10/14/2021    Procedure: Cervical 7- Thoracic 1 epidural steroid injection with fluoroscopy;  Surgeon: Tram Florian MD;  Location: [facility]     INJECT EPIDURAL LUMBAR Right 09/15/2020    Procedure: Lumbar5- sacral 1 epidural steroid injection with fluoroscopy;  Surgeon: Tram Florian MD;  [facility]     INJECT EPIDURAL LUMBAR Right 2021    Procedure: Lumbar 5 sacral 1  epidural steroid injection with fluoroscopy;  Surgeon: Tram Florian MD;  Location: [facility] OR     INJECT SACROILIAC JOINT Bilateral 06/16/2020    Procedure: Bilateral sacroiliac joint steroid injection with fluoroscopy;  Surgeon: Tram Florian MD;  [facility]     LAMINECTOMY THORACIC ONE LEVEL N/A 08/19/2019    Procedure: LAMINECTOMY, SPINE, THORACIC, 11-12 and Part of Lumbar 1, DRAINAGE OF EPIDURAL ABCESS, Epidural Drain Placement X 2;  Surgeon: Yadiel Beal MD;  Location: [facility]     ME PERCUT IMPLNT NEUROELECT,EPIDURAL N/A 08/08/2019    Procedure: TRIAL, SPINAL CORD STIMULATOR WITH [employer];  Surgeon: Sipple, Daniel Peter, DO;  Location: [facility];  Service: Pain     RADIO FREQUENCY ABLATION / DESTRUCTION OF SACROILOAC JOINT DORSAL PRIMARY RAMUS Bilateral 12/17/2019    Procedure: Bilateral lumbar radiofrequency ablation with fluoroscopy and intravenous sedation ( Lumbar 2,3,4,5 medial branch nerves for the bilateral lumbar3-4, 4-5 and 5-sacral1 joints.;  Surgeon: Tram Florian MD;  [facility]     RADIO FREQUENCY ABLATION / DESTRUCTION OF SACROILOAC JOINT DORSAL PRIMARY RAMUS Right 11/17/2020    Procedure: Right lumbar medial branch nerve radiofrequency ablation right L2,3,4,5 nerves supplying the right L3-4, L4-5 and L5-S1 facet joints;  Surgeon: Tram Florian MD;  Location: [facility]     spinal cord stimulator  08/08/2019     spinal cord stimulator removal  08/13/2019      Allergies   Allergen Reactions     Bee Venom Anaphylaxis     Bees      Doxycycline Anaphylaxis     Patient thinks it may have been just nausea and vomiting, however unable to confirm     Erythromycin Anaphylaxis and Shortness Of Breath     Other reaction(s): Vomiting     Hydrocodone-Acetaminophen Itching      Social History     Tobacco Use     Smoking status: Former Smoker     Packs/day: 1.00     Years: 30.00     Pack years: 30.00     Types: Cigarettes     Start date: 1/1/1996     Quit date: 11/14/2021      Years since quittin.0     Smokeless tobacco: Never Used   Substance Use Topics     Alcohol use: No      Wt Readings from Last 1 Encounters:   21 100.1 kg (220 lb 11.2 oz)        Anesthesia Evaluation   Pt has had prior anesthetic.     No history of anesthetic complications       ROS/MED HX  ENT/Pulmonary: Comment: 30 pk yr hx, quit 21    Uses albuterol daily. No maintenance inhaler.    Asthma, longstanding, appears to be chronic obstructive with significant air trapping. Followed in pulmonology @ [facility], last seen 2020.      (+) sleep apnea, uses CPAP, tobacco use, Past use, Moderate Persistent, asthma Treatment: Inhaler daily,  moderate,  COPD, O2 dependent, during Nighttime, 3L liters/min,     Neurologic: Comment: RLS    (+) peripheral neuropathy, - 2/2 cervical & lumbar radiculopathy.  (-) no seizures, no CVA and migraines   Cardiovascular:     (+) hypertension-----Previous cardiac testing   Echo: Date: Results:    Stress Test: Date: Results:    ECG Reviewed: Date: 21 Results:  Sinus rhythm  Normal ECG   Ventricular rate 64 bpm     Cath: Date: Results:      METS/Exercise Tolerance: 1 - Eating, dressing Comment: Unable to walk >1 block due to back pain   Hematologic:  - neg hematologic  ROS  (-) history of blood clots and history of blood transfusion   Musculoskeletal: Comment: S/P multiple spine surgeries    Chronic shoulder pain    Rheumatoid arthritis, followed by Panchito Saenz MD. On methotrexate.        GI/Hepatic:     (+) GERD, Asymptomatic on medication,  (-) liver disease   Renal/Genitourinary:  - neg Renal ROS  (-) renal disease   Endo: Comment: Had steroid injection C-spine 2 months ago    (+) Obesity,  (-) thyroid disease   Psychiatric/Substance Use: Comment: PTSD      (+) psychiatric history anxiety and depression     Infectious Disease:  - neg infectious disease ROS     Malignancy:  - neg malignancy ROS     Other: Comment: Has seen Dr. Florian in pain clinic.     (+) , H/O  Chronic Pain,        Physical Exam    Airway        Mallampati: III   TM distance: > 3 FB   Neck ROM: limited   Mouth opening: > 3 cm    Respiratory Devices and Support         Dental     Comment: Fair dentition, missing 2 lower right molars    (+) missing      Cardiovascular   cardiovascular exam normal          Pulmonary       Comment: Distant BS, minimal inspiratory/expiratory cycle    breath sounds clear to auscultation   (+) decreased breath sounds           OUTSIDE LABS:  CBC:   Lab Results   Component Value Date    WBC 7.5 11/10/2021    WBC 10.7 08/31/2021    HGB 12.5 11/10/2021    HGB 13.2 08/31/2021    HCT 39.8 11/10/2021    HCT 39.9 08/31/2021     11/10/2021     08/31/2021     BMP:   Lab Results   Component Value Date     08/31/2021     04/08/2021    POTASSIUM 3.2 (L) 08/31/2021    POTASSIUM 3.4 04/08/2021    CHLORIDE 100 08/31/2021    CHLORIDE 102 04/08/2021    CO2 31 08/31/2021    CO2 34 (H) 04/08/2021    BUN 23 08/31/2021    BUN 12 04/08/2021    CR 0.99 11/10/2021    CR 1.36 (H) 08/31/2021    GLC 86 08/31/2021    GLC 94 04/08/2021     COAGS:   Lab Results   Component Value Date    PTT 37 08/19/2019    INR 1.09 08/18/2019    FIBR 842 (H) 08/18/2019     POC:   Lab Results   Component Value Date    BGM 90 10/03/2018    HCG Negative 11/28/2010     HEPATIC:   Lab Results   Component Value Date    ALBUMIN 3.1 (L) 11/10/2021    PROTTOTAL 7.4 08/31/2021    ALT 19 11/10/2021    AST 11 11/10/2021    ALKPHOS 76 08/31/2021    BILITOTAL 0.4 08/31/2021     OTHER:   Lab Results   Component Value Date    LACT 2.6 (H) 04/04/2018    A1C 5.8 (H) 04/08/2021    NATALIYA 8.7 08/31/2021    PHOS 3.0 09/23/2019    MAG 1.9 09/23/2019    CRP 24.0 (H) 11/10/2021    SED 14 02/26/2020             PAC Discussion and Assessment    ASA Classification: 3                                                             Mai Sotomayor PA-C

## 2021-11-26 PROCEDURE — 87506 IADNA-DNA/RNA PROBE TQ 6-11: CPT | Performed by: INTERNAL MEDICINE

## 2021-11-26 PROCEDURE — 87493 C DIFF AMPLIFIED PROBE: CPT | Mod: XU | Performed by: INTERNAL MEDICINE

## 2021-11-27 LAB — C DIFF TOX B STL QL: NEGATIVE

## 2021-11-29 ENCOUNTER — TELEPHONE (OUTPATIENT)
Dept: ENDOCRINOLOGY | Facility: CLINIC | Age: 51
End: 2021-11-29

## 2021-11-29 NOTE — TELEPHONE ENCOUNTER
Received an inbasket from Abiola EDMONDS in the PAC clinic. Patient is being referred to [facility] to lose weight prior to surgery.   Left VM message with my direct call back number to schedule.

## 2021-11-30 NOTE — TELEPHONE ENCOUNTER
REFERRAL INFORMATION:    Referring Provider:  Dr. Philip Wilhelm     Referring Clinic:  [facility]    Reason for Visit/Diagnosis: Needs to lose weight before having surgery        FUTURE VISIT INFORMATION:    Appointment Date: 12/6/2021    Appointment Time: 11:45 AM     NOTES RECORD STATUS  DETAILS   OFFICE NOTE from Referring Provider Internal 11/11/2021 Office visit with Dr. Wilhelm      OFFICE NOTE from Other Specialists Internal 11/9/2021 Office visit with Dr. Trent Rahman ([facility])      HOSPITAL DISCHARGE SUMMARY/ ED VISITS  N/A    OPERATIVE REPORT N/A    ENDOSCOPY (EGD)  Internal  2/6/2020   PERTINENT LABS Internal    PATHOLOGY REPORTS (RELATED) N/A    IMAGING (CT, MRI, US, XR)  N/A

## 2021-11-30 NOTE — TELEPHONE ENCOUNTER
Pregabalin refilled 11/16/21, 150 mg caps, take 2 nightly. #60 tabs with 0 refills.  Bennett Goltz, PA-C

## 2021-12-01 ENCOUNTER — TELEPHONE (OUTPATIENT)
Dept: PHYSICAL MEDICINE AND REHAB | Facility: CLINIC | Age: 51
End: 2021-12-01
Payer: COMMERCIAL

## 2021-12-01 NOTE — TELEPHONE ENCOUNTER
Received confirmation from Dr. Guerra to add on to clinic at 12:00pm. Patient called and moved to 12/08/21 at 12:00pm. Date and time confirmed by patient.

## 2021-12-03 ENCOUNTER — TELEPHONE (OUTPATIENT)
Dept: ENDOCRINOLOGY | Facility: CLINIC | Age: 51
End: 2021-12-03
Payer: COMMERCIAL

## 2021-12-03 NOTE — TELEPHONE ENCOUNTER
Patient interested in weight loss surgery    Zayra Ramirez    Instructed to check with insurance company regarding exclusions prior to first appt.    Scheduled to see CNP or BRE at 11:45 am,     Spoke to Patient:1:00pm    Can find information on [email or web address] products at: [email or web address]    Instructed to view seminar and complete pre-visit questionnaire prior to visit at [email or web address].    [phone] contact center phone number      BRANDY SÁNCHEZ CMA

## 2021-12-03 NOTE — PATIENT INSTRUCTIONS
"1. You were seen in the clinic today by Dr. Mijares. If you have any questions or concerns after your appointment, please call the clinic at [phone]. Press \"1\" for scheduling, press \"3\" for nurse advice.    2.   The following has been recommended for you based upon your appointment today:   -    3.   Plan to return the clinic in 10 weeks         Marley Gary LPN  [facility]  Department of Otolaryngology  [phone]    "

## 2021-12-06 ENCOUNTER — PRE VISIT (OUTPATIENT)
Dept: ENDOCRINOLOGY | Facility: CLINIC | Age: 51
End: 2021-12-06

## 2021-12-06 ENCOUNTER — VIRTUAL VISIT (OUTPATIENT)
Dept: ENDOCRINOLOGY | Facility: CLINIC | Age: 51
End: 2021-12-06
Payer: COMMERCIAL

## 2021-12-06 VITALS — BODY MASS INDEX: 39.51 KG/M2 | HEIGHT: 63 IN | WEIGHT: 223 LBS

## 2021-12-06 DIAGNOSIS — Z72.0 TOBACCO ABUSE: ICD-10-CM

## 2021-12-06 DIAGNOSIS — E66.09 CLASS 2 OBESITY DUE TO EXCESS CALORIES WITHOUT SERIOUS COMORBIDITY WITH BODY MASS INDEX (BMI) OF 39.0 TO 39.9 IN ADULT: Primary | ICD-10-CM

## 2021-12-06 DIAGNOSIS — E66.812 CLASS 2 OBESITY DUE TO EXCESS CALORIES WITH BODY MASS INDEX (BMI) OF 39.0 TO 39.9 IN ADULT, UNSPECIFIED WHETHER SERIOUS COMORBIDITY PRESENT: ICD-10-CM

## 2021-12-06 DIAGNOSIS — E66.09 CLASS 2 OBESITY DUE TO EXCESS CALORIES WITH BODY MASS INDEX (BMI) OF 39.0 TO 39.9 IN ADULT, UNSPECIFIED WHETHER SERIOUS COMORBIDITY PRESENT: ICD-10-CM

## 2021-12-06 DIAGNOSIS — E66.812 CLASS 2 OBESITY DUE TO EXCESS CALORIES WITHOUT SERIOUS COMORBIDITY WITH BODY MASS INDEX (BMI) OF 39.0 TO 39.9 IN ADULT: Primary | ICD-10-CM

## 2021-12-06 PROCEDURE — 99214 OFFICE O/P EST MOD 30 MIN: CPT | Mod: 95 | Performed by: PHYSICIAN ASSISTANT

## 2021-12-06 ASSESSMENT — PAIN SCALES - GENERAL: PAINLEVEL: NO PAIN (0)

## 2021-12-06 ASSESSMENT — MIFFLIN-ST. JEOR: SCORE: 1606.04

## 2021-12-06 NOTE — LETTER
"2021       RE: Zayra Ramirez  [address] [place] [address]     Dear Colleague,    Thank you for referring your patient, Zayra Ramirez, to the [facility]. Please see a copy of my visit note below.    Zayra is a 51 year old who is being evaluated via a billable video visit.      How would you like to obtain your AVS? MyChart  If the video visit is dropped, the invitation should be resent by: Text to cell phone: [phone]  Will anyone else be joining your video visit? No    the encounter doing chart review, history and exam, documentation and further activities per the note    New Bariatric Surgery Consultation Note    2021    RE: Zayra Ramirez  MR#: [id number]  : 1970      Referring provider:       2021   Who referred you?        Chief Complaint/Reason for visit: evaluation for possible weight loss surgery    Dear Neena Tam, NICKO CNP (General),    I had the pleasure of seeing your patient, Zayra Ramirez, to evaluate her obesity and consider her for possible weight loss surgery. As you know, Zayra Ramirez is 51 year old.  She has a height of 5' 3.34\", a weight of 223 lbs 0 oz, and calculated Body mass index is 39.08 kg/m . (weight at clinic last week)    Assessment & Plan   Problem List Items Addressed This Visit        Endocrine Diagnoses    Morbid (severe) obesity due to excess calories (H) - Primary      Other Visit Diagnoses     Tobacco abuse        Relevant Orders    Nicotine and Cotinine Urine         HISTORY OF PRESENT ILLNESS:  Weight Loss History Reviewed with Patient 2021   How long have you been overweight? Since late teens through early 20's   What is the most that you have ever weighed? 260   What is the most weight you have lost? 70   I have tried the following methods to lose weight Watching portions or calories, " Exercise, Weight Watchers, Atkins type diet (low carb/high protein)   I have tried the following weight loss medications? (Check all that apply) None   Have you ever had weight loss surgery? No   Referred by ortho to lose weight before neck and back surgery.  Topiramate for headaches in her 20's. No hx of kidney stones.  No hx of glaucoma.    CO-MORBIDITIES OF OBESITY INCLUDE:     12/4/2021   I have the following health issues associated with obesity: High Blood Pressure, Sleep Apnea, GERD (Reflux), Osteoarthritis (joint disease)   I have the following symptoms associated with obesity: Depression, Lower Extremity Swelling, Back Pain, Fatigue, Groin Rash, Hip Pain, Irregular Menstral Cycle   ROBERT: uses CPAP sees [facility]  GERD improved on once daily omeprazole 40 mg. Occasional regurgitation sometimes.  Prediabetes: A1C 5.8    PAST MEDICAL HISTORY:  Past Medical History:   Diagnosis Date     Allergic rhinitis      Anemia      Arthritis      Asthma     copd     Dental abscess 8-2015     Depressive disorder      Gastroesophageal reflux disease      History of emphysema      Hoarseness      Hypertension      Morbid obesity with BMI of 40.0-44.9, adult (H)      Obstructive sleep apnea      Other chronic pain      Respiratory bronchiolitis interstitial lung disease (H)      Sleep apnea        PAST SURGICAL HISTORY:  Past Surgical History:   Procedure Laterality Date     CERVICAL FUSION       COLONOSCOPY       COLONOSCOPY N/A 02/06/2020    Procedure: COLONOSCOPY, WITH POLYPECTOMY AND BIOPSY;  Surgeon: Julian Mccullough MD;  Location:  GI     ENT SURGERY       ESOPHAGOSCOPY, GASTROSCOPY, DUODENOSCOPY (EGD), COMBINED N/A 02/06/2020    Procedure: ESOPHAGOGASTRODUODENOSCOPY (EGD);  Surgeon: Julian Mccullough MD;  Location:  GI     EXCISE LESION INTRAORAL Bilateral 10/03/2018    Procedure: EXCISE LESION INTRAORAL;  Wide Local Excision Of of Left Oral Cavity Ulcer;  Surgeon: Morro Mijares MD;   Location: [facility] OR     HC DRAIN SKIN ABSCESS SIMPLE/SINGLE  03/16/2012    Procedure:INCISION AND DRAINAGE, ABSCESS, SIMPLE; Surgeon:CHRISTIANO HANCOCK; Location: GI     HEAD & NECK SURGERY       HYSTERECTOMY       HYSTERECTOMY       INCISION AND DRAINAGE ABDOMEN WASHOUT, COMBINED       INJECT EPIDURAL CERVICAL N/A 10/14/2021    Procedure: Cervical 7- Thoracic 1 epidural steroid injection with fluoroscopy;  Surgeon: Tram Florian MD;  Location: [facility] OR     INJECT EPIDURAL LUMBAR Right 09/15/2020    Procedure: Lumbar5- sacral 1 epidural steroid injection with fluoroscopy;  Surgeon: Tram Florian MD;  [facility]     INJECT EPIDURAL LUMBAR Right 06/29/2021    Procedure: Lumbar 5 sacral 1 epidural steroid injection with fluoroscopy;  Surgeon: Tram Florian MD;  Location: [facility] OR     INJECT SACROILIAC JOINT Bilateral 06/16/2020    Procedure: Bilateral sacroiliac joint steroid injection with fluoroscopy;  Surgeon: Tram Florian MD;  [facility]     LAMINECTOMY THORACIC ONE LEVEL N/A 08/19/2019    Procedure: LAMINECTOMY, SPINE, THORACIC, 11-12 and Part of Lumbar 1, DRAINAGE OF EPIDURAL ABCESS, Epidural Drain Placement X 2;  Surgeon: Yadiel Beal MD;  Location: [facility] OR     NM PERCUT IMPLNT NEUROELECT,EPIDURAL N/A 08/08/2019    Procedure: TRIAL, SPINAL CORD STIMULATOR WITH BOSTON SCIENTIFIC;  Surgeon: Sipple, Daniel Peter, DO;  Location: [facility];  Service: Pain     RADIO FREQUENCY ABLATION / DESTRUCTION OF SACROILOAC JOINT DORSAL PRIMARY RAMUS Bilateral 12/17/2019    Procedure: Bilateral lumbar radiofrequency ablation with fluoroscopy and intravenous sedation ( Lumbar 2,3,4,5 medial branch nerves for the bilateral lumbar3-4, 4-5 and 5-sacral1 joints.;  Surgeon: Tram Florian MD;  [facility]     RADIO FREQUENCY ABLATION / DESTRUCTION OF SACROILOAC JOINT DORSAL PRIMARY RAMUS Right 11/17/2020    Procedure: Right lumbar medial branch nerve radiofrequency ablation right L2,3,4,5 nerves  supplying the right L3-4, L4-5 and L5-S1 facet joints;  Surgeon: Tram Florian MD;  Location: [facility] OR     spinal cord stimulator  08/08/2019     spinal cord stimulator removal  08/13/2019       FAMILY HISTORY:   Family History   Problem Relation Age of Onset     Asthma Mother      Restless Leg Syndrome Mother      Other Cancer Father         base of tongue cancer at age ~65     Hypertension Father      Back Pain Father      Restless Leg Syndrome Father      Coronary Artery Disease Father      Restless Leg Syndrome Sister      Breast Cancer Maternal Aunt 55     Anesthesia Reaction No family hx of      Deep Vein Thrombosis (DVT) No family hx of        SOCIAL HISTORY:   Social History Questions Reviewed With Patient 12/4/2021   Which best describes your employment status (select all that apply) I work part-time, I am disabled, I am laid off   If you work, what is your occupation? [employer] in the summer    Which best describes your marital status: in a relationship   Do you have children? Yes   Who do you have in your support network that can be available to help you for the first 2 weeks after surgery? family, friends   Who can you count on for support throughout your weight loss surgery journey? sister, friends,boy friend   Can you afford 3 meals a day?  Yes   Good support with family and friends.    HABITS:     12/4/2021   How often do you drink alcohol? Never   Have you ever used any of the following nicotine products? Cigarettes   If you previously used any of these products, what year did you quit? 2021   Have you or are you currently using street drugs or prescription strength medication for which you do not have a prescription for? Yes   Do you have a history of chemical dependency (alcohol or drug abuse)? No   Quit smoking Nov 14th.   Occasional marijuana use. Last use 1 week ago.    Currently taking narcotic/opioids No    PSYCHOLOGICAL HISTORY:   Psychological History Reviewed With Patient 12/4/2021   Have  you ever attempted suicide? Never.   Have you had thoughts of suicide in the past year? Yes   Have you ever been hospitalized for mental illness or a suicide attempt? More than 10 years ago.   Do you have a history of chronic pain? Yes   Have you ever been diagnosed with fibromyalgia? Yes   Are you currently being treated for any of the following? (select all that apply) Depression, Anxiety, Post traumatic stress disorder   Are you currently seeing a therapist or counselor?  No   Are you currently seeing a psychiatrist? Yes   Borderline personality well controlled per patient  Atypical depression per patient  Marcialfermínmbalta    ROS:     12/4/2021   Skin:  Skin fold rashes (groin or other folds)   HEENT: Dizziness/lightheadedness, Missing teeth   If you answered yes to missing teeth, please indicate how many: 9   Musculoskeletal: Joint Pain, Back pain, Arthritis   Cardiovascular: Shortness of breath with activity   Pulmonary: Shortness of breath with activity, Snoring, Excessively sleep during the day   Gastrointestinal: Heartburn, Reflux, Diarrhea, Difficulty swallowing (food gets stuck)   Genitourinary: None of the above   Hematological: None of the above   Neurological: None of the above   Female only: None of the above   Recent GI issues with diarrhea every 3-4 weeks since last March.  Seeing GI Feb.  Hoarse voice since having thrush since last spring.  Seeing ENT.    EATING BEHAVIORS:     12/4/2021   Have you or anyone else thought that you had an eating disorder? No   Do you currently binge eat (eat a large amount of food in a short time)? No   Are you an emotional eater? Yes   Do you get up to eat after falling asleep? Yes       EXERCISE:     12/4/2021   How often do you exercise? Never   What keeps you from being more active?  I am as active as I can possbily be, Pain, I have recently been sick, My ability to walk or move around is limited, Shortness of breath, Too tired       MEDICATIONS:  Current  Outpatient Medications   Medication Sig Dispense Refill     albuterol (PROAIR HFA, PROVENTIL HFA, VENTOLIN HFA) 108 (90 BASE) MCG/ACT inhaler Inhale 2 puffs into the lungs every 6 hours as needed for shortness of breath / dyspnea or wheezing (PT last dose 1.23.2020)        busPIRone (BUSPAR) 15 MG tablet Take 15 mg by mouth every morning Also takes 30 mg at bedtime - see other order.       busPIRone HCl (BUSPAR) 30 MG tablet Take 30 mg by mouth At Bedtime Also takes 15 mg in the morning - see other order.       carboxymethylcellulose (CARBOXYMETHYLCELLULOSE SODIUM) 0.5 % SOLN ophthalmic solution Place 1 drop into both eyes 3 times daily as needed for dry eyes 15 mL 11     celecoxib (CELEBREX) 200 MG capsule Take 1 capsule (200 mg) by mouth every morning 60 capsule 4     cetirizine (ZYRTEC) 10 MG tablet Take 1 tablet (10 mg) by mouth daily 69 tablet 3     cholecalciferol ( ULTRA STRENGTH) 2000 units CAPS Take 2,000 Units by mouth every evening        cholestyramine (QUESTRAN) 4 GM/DOSE powder Take 4 grams once per day at least 2 hours after and at lease 2 hours before eating or taking medications. 378 g 0     cyclobenzaprine (FLEXERIL) 10 MG tablet Take 1 tablet (10 mg) by mouth daily (Patient taking differently: Take 10 mg by mouth At Bedtime ) 90 tablet 1     DULoxetine (CYMBALTA) 60 MG capsule Take 120 mg by mouth At Bedtime        EPINEPHrine (EPIPEN/ADRENACLICK/OR ANY BX GENERIC EQUIV) 0.3 MG/0.3ML injection 2-pack INJECT 0.3ML INTO THE MUSCLE ONCE AS NEEDED FOR ANAPHYLAXIS       ferrous sulfate (FEROSUL) 325 (65 Fe) MG tablet Take 325 mg by mouth daily (with breakfast)   0     folic acid (FOLVITE) 1 MG tablet Take 1 tablet (1 mg) by mouth daily 90 tablet 3     ibuprofen (ADVIL/MOTRIN) 200 MG tablet Take 400 mg by mouth every 8 hours as needed for mild pain       lisinopril-hydrochlorothiazide (ZESTORETIC) 20-25 MG tablet Take 1 tablet by mouth daily (Patient taking differently: Take 1 tablet by mouth every  evening ) 90 tablet 3     methotrexate sodium 2.5 MG TABS TAKE 9 TABLETS BY MOUTH ONCE WEEKLY  Labs  past due. appt past due. (Patient taking differently: TAKE 9 TABLETS BY MOUTH ONCE WEEKLY  Labs  past due. appt past due.  Takes on Mondays.) 108 tablet 5     montelukast (SINGULAIR) 10 MG tablet Take 10 mg by mouth At Bedtime       nystatin (MYCOSTATIN) 243604 UNIT/GM external cream Apply topically 2 times daily (Patient taking differently: Apply topically daily as needed ) 30 g 3     omeprazole (PRILOSEC) 40 MG DR capsule Take 1 capsule (40 mg) by mouth daily (Patient taking differently: Take 40 mg by mouth every evening ) 180 capsule 3     OXYGEN-HELIUM IN 2-3L PRN during the day, 3L @ night    Oxygen only not helium       pregabalin (LYRICA) 150 MG capsule Take 1 capsule (150 mg) by mouth 2 times daily 60 capsule 0     Respiratory Therapy Supplies (CAREUCH UNIVERSL CPAP FILTER) MISC        rOPINIRole (REQUIP) 0.5 MG tablet Take 1 tablet (0.5 mg) by mouth At Bedtime Take 1 tab by mouth at bedtime. 90 tablet 1     vitamin C 500 MG TABS Take 500 mg by mouth daily (with lunch)        zinc sulfate (ZINCATE) 220 (50 Zn) MG capsule Take 220 mg by mouth daily (with lunch)        triamcinolone (KENALOG) 0.1 % external ointment Apply topically 2 times daily 15 g 1       ALLERGIES:  Allergies   Allergen Reactions     Bee Venom Anaphylaxis     Bees      Doxycycline Anaphylaxis     Patient thinks it may have been just nausea and vomiting, however unable to confirm     Erythromycin Anaphylaxis and Shortness Of Breath     Other reaction(s): Vomiting     Hydrocodone-Acetaminophen Itching     EGD: 2020 in Epic    Findings:        LA Grade A (one or more mucosal breaks less than 5 mm, not extending        between tops of 2 mucosal folds) esophagitis with no bleeding was found        at the gastroesophageal junction.        The exam of the esophagus was otherwise normal.        The entire examined stomach was normal.        The  examined duodenum was normal.                                                                                     Impression:          - LA Grade A reflux esophagitis.                        - Normal stomach.                        - Normal examined duodenum.                        - No specimens collected.   Recommendation:      - Discharge patient to home.                        - Resume previous diet.                        - Use Prilosec (omeprazole) 40 mg PO daily.                        - Perform a colonoscopy as previously scheduled.     Orders Only on 11/10/2021   Component Date Value Ref Range Status     WBC Count 11/10/2021 7.5  4.0 - 11.0 10e3/uL Final     RBC Count 11/10/2021 3.94  3.80 - 5.20 10e6/uL Final     Hemoglobin 11/10/2021 12.5  11.7 - 15.7 g/dL Final     Hematocrit 11/10/2021 39.8  35.0 - 47.0 % Final     MCV 11/10/2021 101* 78 - 100 fL Final     MCH 11/10/2021 31.7  26.5 - 33.0 pg Final     MCHC 11/10/2021 31.4* 31.5 - 36.5 g/dL Final     RDW 11/10/2021 15.1* 10.0 - 15.0 % Final     Platelet Count 11/10/2021 326  150 - 450 10e3/uL Final     AST 11/10/2021 11  0 - 45 U/L Final     ALT 11/10/2021 19  0 - 50 U/L Final     Albumin 11/10/2021 3.1* 3.4 - 5.0 g/dL Final     Creatinine 11/10/2021 0.99  0.52 - 1.04 mg/dL Final     GFR Estimate 11/10/2021 67  >60 mL/min/1.73m2 Final    As of July 11, 2021, eGFR is calculated by the CKD-EPI creatinine equation, without race adjustment. eGFR can be influenced by muscle mass, exercise, and diet. The reported eGFR is an estimation only and is only applicable if the renal function is stable.     CRP Inflammation 11/10/2021 24.0* 0.0 - 8.0 mg/L Final     C Difficile Toxin B by PCR 11/26/2021 Negative  Negative Final    A negative result does not exclude actual disease due to C. difficile and may be due to improper collection, handling and storage of the specimen or the number of organisms in the specimen is below the detection limit of the assay.      "Campylobacter group 11/26/2021 Not Detected  Not Detected Final     Salmonella species 11/26/2021 Not Detected  Not Detected Final     Shigella species 11/26/2021 Not Detected  Not Detected Final     Vibrio group 11/26/2021 Not Detected  Not Detected Final     Rotavirus 11/26/2021 Not Detected  Not Detected Final     Shiga toxin 1 gene 11/26/2021 Not Detected  Not Detected Final     Shiga toxin 2 gene 11/26/2021 Not Detected  Not Detected Final     Norovirus I and II 11/26/2021 Not Detected  Not Detected Final     Yersinia enterocolitica 11/26/2021 Not Detected  Not Detected Final       PHYSICAL EXAM:  Objective    Ht 1.609 m (5' 3.34\")   Wt 101.2 kg (223 lb)   LMP  (LMP Unknown)   BMI 39.08 kg/m             Physical Exam   GENERAL: Healthy, alert and no distress  EYES: Eyes grossly normal to inspection.  No discharge or erythema, or obvious scleral/conjunctival abnormalities.  RESP: No audible wheeze, cough, or visible cyanosis.  No visible retractions or increased work of breathing.    SKIN: Visible skin clear. No significant rash, abnormal pigmentation or lesions.  NEURO: Cranial nerves grossly intact.  Mentation and speech appropriate for age.  PSYCH: Mentation appears normal, affect normal/bright, judgement and insight intact, normal speech and appearance well-groomed.      In summary, Zayra Ramirez has Class III obesity with a body mass index of Body mass index is 39.08 kg/m . kg/m2 and the comorbidities stated above. She completed an informational seminar and is a possible candidate for the laparoscopic gastric sleeve.  She will have to complete the following pre-requisites:    If you have not already watched our online seminar please go to [email or web address]    Weight loss requirement: 10 prior to surgery. Will have final weight check 2-3 weeks prior to surgery at anesthesia or nurse pre-op teaching visit.    -Need current weight confirmation at primary clinic or weight management clinic " No    Bariatric labs ordered, call for a lab only appointment at any [facility] lab. To find a lab location near you, please call [phone]. Please let us know if orders need to be faxed to a non [facility] lab.    Schedule bariatric psych eval as soon as possible.  List of psychologists will be sent to you via [employer] or given to you in clinic.     Call Mono Brady at [phone] to discuss insurance coverage for bariatric surgery.  Please check with your insurance regarding bariatric surgery coverage also. Mono can also help you with scheduling psych eval if you are having difficulties.    The following clearance letters are needed: Letters will be sent to you via [employer] or given to you in clinic  - Letter of support from primary care provider. Provider can submit through electronic medical record or fax to [phone]  - sleep clearance  - psychiatrist letter of support  - GI evaluation for diarrhea needed  -ENT evaluation for hoarse voice     Smoking cessation and nicotine test needed: Yes    Consider topiramate ramp to 75mg.  Discuss with psychiatrist first.    Birth control after surgery discussed. Patient instructed that 2 forms of birth control required after surgery and to avoid pregnancy for at least 18 months after surgery:     NEXT VISITS: A  should reach out to you to schedule the following appointments.  If they do not reach you please call [phone] to schedule the following appointments:    -See dietitian in 1 month and monthly for 3 months    -See Odette in 3 months to follow up on pre-op weight loss and weight loss medications    -See Dr Gaffney after psych eval and letter from psychiatrist.  Call [phone] to schedule    Today in the office we discussed gastric sleeve surgery. Preoperative, perioperative, and postoperative processes, management, and follow up were addressed.  Risks and benefits were outlined including the risk of death, staple line leak (1-2%),  PE, DVT, ulcer, worsening GERD, N/V, stricture, hernia, wound infection, weight regain, and vitamin deficiencies. I emphasized exercise and activity along with appropriate food choice as the main foundation for weight loss with surgery providing surgical reinforcement of this.  All questions were answered.  A goal sheet and support group handout were given to the patient.      Once the patient has completed the requirements in their task list and there are no further recommendations, the pt will be allowed to see the surgeon of her choice for consultation on the laparoscopic gastric sleeve surgery. Patient verbalizes understanding of the process to surgery and expectations for the postoperative period including the need for lifelong lifestyle changes, vitamin supplementation, and laboratory monitoring.        Again, thank you for allowing me to participate in the care of your patient.      Sincerely,    Odette More PA-C

## 2021-12-06 NOTE — NURSING NOTE
"Chief Complaint   Patient presents with     Consult     Consultation Baraitric Surgery here to disscuss surgery       Vitals:    12/06/21 1142   Weight: 101.2 kg (223 lb)   Height: 1.609 m (5' 3.34\")       Body mass index is 39.08 kg/m .                         "

## 2021-12-06 NOTE — PROGRESS NOTES
"Zayra is a 51 year old who is being evaluated via a billable video visit.      How would you like to obtain your AVS? MyChart  If the video visit is dropped, the invitation should be resent by: Text to cell phone: [phone]  Will anyone else be joining your video visit? No    Video Start Time: 11:58 AM    Video-Visit Details    Type of service:  Video Visit    Video End Time:1220    Originating Location (pt. Location): Home    Distant Location (provider location):  [facility] [place]     Platform used for Video Visit: [employer]     30 minutes spent on the date of the encounter doing chart review, history and exam, documentation and further activities per the note    New Bariatric Surgery Consultation Note    2021    RE: Zayra Ramirez  MR#: [id number]  : 1970      Referring provider:       2021   Who referred you?        Chief Complaint/Reason for visit: evaluation for possible weight loss surgery    Dear Neena Tam, APRN CNP (General),    I had the pleasure of seeing your patient, Zayra Ramirez, to evaluate her obesity and consider her for possible weight loss surgery. As you know, Zayra Ramirez is 51 year old.  She has a height of 5' 3.34\", a weight of 223 lbs 0 oz, and calculated Body mass index is 39.08 kg/m . (weight at clinic last week)    Assessment & Plan   Problem List Items Addressed This Visit        Endocrine Diagnoses    Morbid (severe) obesity due to excess calories (H) - Primary      Other Visit Diagnoses     Tobacco abuse        Relevant Orders    Nicotine and Cotinine Urine         HISTORY OF PRESENT ILLNESS:  Weight Loss History Reviewed with Patient 2021   How long have you been overweight? Since late teens through early 20's   What is the most that you have ever weighed? 260   What is the most weight you have lost? 70   I have tried the following methods to lose weight Watching portions or calories, " Exercise, Weight Watchers, Atkins type diet (low carb/high protein)   I have tried the following weight loss medications? (Check all that apply) None   Have you ever had weight loss surgery? No   Referred by ortho to lose weight before neck and back surgery.  Topiramate for headaches in her 20's. No hx of kidney stones.  No hx of glaucoma.    CO-MORBIDITIES OF OBESITY INCLUDE:     12/4/2021   I have the following health issues associated with obesity: High Blood Pressure, Sleep Apnea, GERD (Reflux), Osteoarthritis (joint disease)   I have the following symptoms associated with obesity: Depression, Lower Extremity Swelling, Back Pain, Fatigue, Groin Rash, Hip Pain, Irregular Menstral Cycle   ROBERT: uses CPAP sees [facility]  GERD improved on once daily omeprazole 40 mg. Occasional regurgitation sometimes.  Prediabetes: A1C 5.8    PAST MEDICAL HISTORY:  Past Medical History:   Diagnosis Date     Allergic rhinitis      Anemia      Arthritis      Asthma     copd     Dental abscess 8-2015     Depressive disorder      Gastroesophageal reflux disease      History of emphysema      Hoarseness      Hypertension      Morbid obesity with BMI of 40.0-44.9, adult (H)      Obstructive sleep apnea      Other chronic pain      Respiratory bronchiolitis interstitial lung disease (H)      Sleep apnea        PAST SURGICAL HISTORY:  Past Surgical History:   Procedure Laterality Date     CERVICAL FUSION       COLONOSCOPY       COLONOSCOPY N/A 02/06/2020    Procedure: COLONOSCOPY, WITH POLYPECTOMY AND BIOPSY;  Surgeon: Julian Mccullough MD;  Location:  GI     ENT SURGERY       ESOPHAGOSCOPY, GASTROSCOPY, DUODENOSCOPY (EGD), COMBINED N/A 02/06/2020    Procedure: ESOPHAGOGASTRODUODENOSCOPY (EGD);  Surgeon: Julian Mccullough MD;  Location:  GI     EXCISE LESION INTRAORAL Bilateral 10/03/2018    Procedure: EXCISE LESION INTRAORAL;  Wide Local Excision Of of Left Oral Cavity Ulcer;  Surgeon: Morro Mijares MD;   Location: [facility] OR     HC DRAIN SKIN ABSCESS SIMPLE/SINGLE  03/16/2012    Procedure:INCISION AND DRAINAGE, ABSCESS, SIMPLE; Surgeon:CHRISTIANO HANCOCK; Location: GI     HEAD & NECK SURGERY       HYSTERECTOMY       HYSTERECTOMY       INCISION AND DRAINAGE ABDOMEN WASHOUT, COMBINED       INJECT EPIDURAL CERVICAL N/A 10/14/2021    Procedure: Cervical 7- Thoracic 1 epidural steroid injection with fluoroscopy;  Surgeon: Tram Florian MD;  Location: [facility] OR     INJECT EPIDURAL LUMBAR Right 09/15/2020    Procedure: Lumbar5- sacral 1 epidural steroid injection with fluoroscopy;  Surgeon: Tram Florian MD;  [facility]     INJECT EPIDURAL LUMBAR Right 06/29/2021    Procedure: Lumbar 5 sacral 1 epidural steroid injection with fluoroscopy;  Surgeon: Tram Florian MD;  Location: [facility] OR     INJECT SACROILIAC JOINT Bilateral 06/16/2020    Procedure: Bilateral sacroiliac joint steroid injection with fluoroscopy;  Surgeon: Tram Florian MD;  [facility]     LAMINECTOMY THORACIC ONE LEVEL N/A 08/19/2019    Procedure: LAMINECTOMY, SPINE, THORACIC, 11-12 and Part of Lumbar 1, DRAINAGE OF EPIDURAL ABCESS, Epidural Drain Placement X 2;  Surgeon: Yadiel Beal MD;  Location: [facility] OR     DE PERCUT IMPLNT NEUROELECT,EPIDURAL N/A 08/08/2019    Procedure: TRIAL, SPINAL CORD STIMULATOR WITH BOSTON SCIENTIFIC;  Surgeon: Sipple, Daniel Peter, DO;  Location: [facility];  Service: Pain     RADIO FREQUENCY ABLATION / DESTRUCTION OF SACROILOAC JOINT DORSAL PRIMARY RAMUS Bilateral 12/17/2019    Procedure: Bilateral lumbar radiofrequency ablation with fluoroscopy and intravenous sedation ( Lumbar 2,3,4,5 medial branch nerves for the bilateral lumbar3-4, 4-5 and 5-sacral1 joints.;  Surgeon: Tram Florian MD;  [facility]     RADIO FREQUENCY ABLATION / DESTRUCTION OF SACROILOAC JOINT DORSAL PRIMARY RAMUS Right 11/17/2020    Procedure: Right lumbar medial branch nerve radiofrequency ablation right L2,3,4,5 nerves  supplying the right L3-4, L4-5 and L5-S1 facet joints;  Surgeon: Tram Florian MD;  Location: [facility] OR     spinal cord stimulator  08/08/2019     spinal cord stimulator removal  08/13/2019       FAMILY HISTORY:   Family History   Problem Relation Age of Onset     Asthma Mother      Restless Leg Syndrome Mother      Other Cancer Father         base of tongue cancer at age ~65     Hypertension Father      Back Pain Father      Restless Leg Syndrome Father      Coronary Artery Disease Father      Restless Leg Syndrome Sister      Breast Cancer Maternal Aunt 55     Anesthesia Reaction No family hx of      Deep Vein Thrombosis (DVT) No family hx of        SOCIAL HISTORY:   Social History Questions Reviewed With Patient 12/4/2021   Which best describes your employment status (select all that apply) I work part-time, I am disabled, I am laid off   If you work, what is your occupation? [employer] 2001 in the summer    Which best describes your marital status: in a relationship   Do you have children? Yes   Who do you have in your support network that can be available to help you for the first 2 weeks after surgery? family, friends   Who can you count on for support throughout your weight loss surgery journey? sister, friends,boy friend   Can you afford 3 meals a day?  Yes   Good support with family and friends.    HABITS:     12/4/2021   How often do you drink alcohol? Never   Have you ever used any of the following nicotine products? Cigarettes   If you previously used any of these products, what year did you quit? 2021   Have you or are you currently using street drugs or prescription strength medication for which you do not have a prescription for? Yes   Do you have a history of chemical dependency (alcohol or drug abuse)? No   Quit smoking Nov 14th.   Occasional marijuana use. Last use 1 week ago.    Currently taking narcotic/opioids No    PSYCHOLOGICAL HISTORY:   Psychological History Reviewed With Patient 12/4/2021   Have  you ever attempted suicide? Never.   Have you had thoughts of suicide in the past year? Yes   Have you ever been hospitalized for mental illness or a suicide attempt? More than 10 years ago.   Do you have a history of chronic pain? Yes   Have you ever been diagnosed with fibromyalgia? Yes   Are you currently being treated for any of the following? (select all that apply) Depression, Anxiety, Post traumatic stress disorder   Are you currently seeing a therapist or counselor?  No   Are you currently seeing a psychiatrist? Yes   Borderline personality well controlled per patient  Atypical depression per patient  Marcialfermínmbalta    ROS:     12/4/2021   Skin:  Skin fold rashes (groin or other folds)   HEENT: Dizziness/lightheadedness, Missing teeth   If you answered yes to missing teeth, please indicate how many: 9   Musculoskeletal: Joint Pain, Back pain, Arthritis   Cardiovascular: Shortness of breath with activity   Pulmonary: Shortness of breath with activity, Snoring, Excessively sleep during the day   Gastrointestinal: Heartburn, Reflux, Diarrhea, Difficulty swallowing (food gets stuck)   Genitourinary: None of the above   Hematological: None of the above   Neurological: None of the above   Female only: None of the above   Recent GI issues with diarrhea every 3-4 weeks since last March.  Seeing GI Feb.  Hoarse voice since having thrush since last spring.  Seeing ENT.    EATING BEHAVIORS:     12/4/2021   Have you or anyone else thought that you had an eating disorder? No   Do you currently binge eat (eat a large amount of food in a short time)? No   Are you an emotional eater? Yes   Do you get up to eat after falling asleep? Yes       EXERCISE:     12/4/2021   How often do you exercise? Never   What keeps you from being more active?  I am as active as I can possbily be, Pain, I have recently been sick, My ability to walk or move around is limited, Shortness of breath, Too tired       MEDICATIONS:  Current  Outpatient Medications   Medication Sig Dispense Refill     albuterol (PROAIR HFA, PROVENTIL HFA, VENTOLIN HFA) 108 (90 BASE) MCG/ACT inhaler Inhale 2 puffs into the lungs every 6 hours as needed for shortness of breath / dyspnea or wheezing (PT last dose 1.23.2020)        busPIRone (BUSPAR) 15 MG tablet Take 15 mg by mouth every morning Also takes 30 mg at bedtime - see other order.       busPIRone HCl (BUSPAR) 30 MG tablet Take 30 mg by mouth At Bedtime Also takes 15 mg in the morning - see other order.       carboxymethylcellulose (CARBOXYMETHYLCELLULOSE SODIUM) 0.5 % SOLN ophthalmic solution Place 1 drop into both eyes 3 times daily as needed for dry eyes 15 mL 11     celecoxib (CELEBREX) 200 MG capsule Take 1 capsule (200 mg) by mouth every morning 60 capsule 4     cetirizine (ZYRTEC) 10 MG tablet Take 1 tablet (10 mg) by mouth daily 69 tablet 3     cholecalciferol ( ULTRA STRENGTH) 2000 units CAPS Take 2,000 Units by mouth every evening        cholestyramine (QUESTRAN) 4 GM/DOSE powder Take 4 grams once per day at least 2 hours after and at lease 2 hours before eating or taking medications. 378 g 0     cyclobenzaprine (FLEXERIL) 10 MG tablet Take 1 tablet (10 mg) by mouth daily (Patient taking differently: Take 10 mg by mouth At Bedtime ) 90 tablet 1     DULoxetine (CYMBALTA) 60 MG capsule Take 120 mg by mouth At Bedtime        EPINEPHrine (EPIPEN/ADRENACLICK/OR ANY BX GENERIC EQUIV) 0.3 MG/0.3ML injection 2-pack INJECT 0.3ML INTO THE MUSCLE ONCE AS NEEDED FOR ANAPHYLAXIS       ferrous sulfate (FEROSUL) 325 (65 Fe) MG tablet Take 325 mg by mouth daily (with breakfast)   0     folic acid (FOLVITE) 1 MG tablet Take 1 tablet (1 mg) by mouth daily 90 tablet 3     ibuprofen (ADVIL/MOTRIN) 200 MG tablet Take 400 mg by mouth every 8 hours as needed for mild pain       lisinopril-hydrochlorothiazide (ZESTORETIC) 20-25 MG tablet Take 1 tablet by mouth daily (Patient taking differently: Take 1 tablet by mouth every  evening ) 90 tablet 3     methotrexate sodium 2.5 MG TABS TAKE 9 TABLETS BY MOUTH ONCE WEEKLY  Labs  past due. appt past due. (Patient taking differently: TAKE 9 TABLETS BY MOUTH ONCE WEEKLY  Labs  past due. appt past due.  Takes on Mondays.) 108 tablet 5     montelukast (SINGULAIR) 10 MG tablet Take 10 mg by mouth At Bedtime       nystatin (MYCOSTATIN) 511166 UNIT/GM external cream Apply topically 2 times daily (Patient taking differently: Apply topically daily as needed ) 30 g 3     omeprazole (PRILOSEC) 40 MG DR capsule Take 1 capsule (40 mg) by mouth daily (Patient taking differently: Take 40 mg by mouth every evening ) 180 capsule 3     OXYGEN-HELIUM IN 2-3L PRN during the day, 3L @ night    Oxygen only not helium       pregabalin (LYRICA) 150 MG capsule Take 1 capsule (150 mg) by mouth 2 times daily 60 capsule 0     Respiratory Therapy Supplies (CAREUCH UNIVERSL CPAP FILTER) MISC        rOPINIRole (REQUIP) 0.5 MG tablet Take 1 tablet (0.5 mg) by mouth At Bedtime Take 1 tab by mouth at bedtime. 90 tablet 1     vitamin C 500 MG TABS Take 500 mg by mouth daily (with lunch)        zinc sulfate (ZINCATE) 220 (50 Zn) MG capsule Take 220 mg by mouth daily (with lunch)        triamcinolone (KENALOG) 0.1 % external ointment Apply topically 2 times daily 15 g 1       ALLERGIES:  Allergies   Allergen Reactions     Bee Venom Anaphylaxis     Bees      Doxycycline Anaphylaxis     Patient thinks it may have been just nausea and vomiting, however unable to confirm     Erythromycin Anaphylaxis and Shortness Of Breath     Other reaction(s): Vomiting     Hydrocodone-Acetaminophen Itching     EGD: 2020 in Epic    Findings:        LA Grade A (one or more mucosal breaks less than 5 mm, not extending        between tops of 2 mucosal folds) esophagitis with no bleeding was found        at the gastroesophageal junction.        The exam of the esophagus was otherwise normal.        The entire examined stomach was normal.        The  examined duodenum was normal.                                                                                     Impression:          - LA Grade A reflux esophagitis.                        - Normal stomach.                        - Normal examined duodenum.                        - No specimens collected.   Recommendation:      - Discharge patient to home.                        - Resume previous diet.                        - Use Prilosec (omeprazole) 40 mg PO daily.                        - Perform a colonoscopy as previously scheduled.     Orders Only on 11/10/2021   Component Date Value Ref Range Status     WBC Count 11/10/2021 7.5  4.0 - 11.0 10e3/uL Final     RBC Count 11/10/2021 3.94  3.80 - 5.20 10e6/uL Final     Hemoglobin 11/10/2021 12.5  11.7 - 15.7 g/dL Final     Hematocrit 11/10/2021 39.8  35.0 - 47.0 % Final     MCV 11/10/2021 101* 78 - 100 fL Final     MCH 11/10/2021 31.7  26.5 - 33.0 pg Final     MCHC 11/10/2021 31.4* 31.5 - 36.5 g/dL Final     RDW 11/10/2021 15.1* 10.0 - 15.0 % Final     Platelet Count 11/10/2021 326  150 - 450 10e3/uL Final     AST 11/10/2021 11  0 - 45 U/L Final     ALT 11/10/2021 19  0 - 50 U/L Final     Albumin 11/10/2021 3.1* 3.4 - 5.0 g/dL Final     Creatinine 11/10/2021 0.99  0.52 - 1.04 mg/dL Final     GFR Estimate 11/10/2021 67  >60 mL/min/1.73m2 Final    As of July 11, 2021, eGFR is calculated by the CKD-EPI creatinine equation, without race adjustment. eGFR can be influenced by muscle mass, exercise, and diet. The reported eGFR is an estimation only and is only applicable if the renal function is stable.     CRP Inflammation 11/10/2021 24.0* 0.0 - 8.0 mg/L Final     C Difficile Toxin B by PCR 11/26/2021 Negative  Negative Final    A negative result does not exclude actual disease due to C. difficile and may be due to improper collection, handling and storage of the specimen or the number of organisms in the specimen is below the detection limit of the assay.      "Campylobacter group 11/26/2021 Not Detected  Not Detected Final     Salmonella species 11/26/2021 Not Detected  Not Detected Final     Shigella species 11/26/2021 Not Detected  Not Detected Final     Vibrio group 11/26/2021 Not Detected  Not Detected Final     Rotavirus 11/26/2021 Not Detected  Not Detected Final     Shiga toxin 1 gene 11/26/2021 Not Detected  Not Detected Final     Shiga toxin 2 gene 11/26/2021 Not Detected  Not Detected Final     Norovirus I and II 11/26/2021 Not Detected  Not Detected Final     Yersinia enterocolitica 11/26/2021 Not Detected  Not Detected Final       PHYSICAL EXAM:  Objective    Ht 1.609 m (5' 3.34\")   Wt 101.2 kg (223 lb)   LMP  (LMP Unknown)   BMI 39.08 kg/m             Physical Exam   GENERAL: Healthy, alert and no distress  EYES: Eyes grossly normal to inspection.  No discharge or erythema, or obvious scleral/conjunctival abnormalities.  RESP: No audible wheeze, cough, or visible cyanosis.  No visible retractions or increased work of breathing.    SKIN: Visible skin clear. No significant rash, abnormal pigmentation or lesions.  NEURO: Cranial nerves grossly intact.  Mentation and speech appropriate for age.  PSYCH: Mentation appears normal, affect normal/bright, judgement and insight intact, normal speech and appearance well-groomed.      In summary, Zayra Ramirez has Class III obesity with a body mass index of Body mass index is 39.08 kg/m . kg/m2 and the comorbidities stated above. She completed an informational seminar and is a possible candidate for the laparoscopic gastric sleeve.  She will have to complete the following pre-requisites:    If you have not already watched our online seminar please go to [email or web address]    Weight loss requirement: 10 prior to surgery. Will have final weight check 2-3 weeks prior to surgery at anesthesia or nurse pre-op teaching visit.    -Need current weight confirmation at primary clinic or weight management clinic " No    Bariatric labs ordered, call for a lab only appointment at any [facility] lab. To find a lab location near you, please call [phone]. Please let us know if orders need to be faxed to a non [facility] lab.    Schedule bariatric psych eval as soon as possible.  List of psychologists will be sent to you via [employer] or given to you in clinic.     Call Mono Brady at [phone] to discuss insurance coverage for bariatric surgery.  Please check with your insurance regarding bariatric surgery coverage also. Mono can also help you with scheduling psych eval if you are having difficulties.    The following clearance letters are needed: Letters will be sent to you via [employer] or given to you in clinic  - Letter of support from primary care provider. Provider can submit through electronic medical record or fax to [phone]  - sleep clearance  - psychiatrist letter of support  - GI evaluation for diarrhea needed  -ENT evaluation for hoarse voice     Smoking cessation and nicotine test needed: Yes    Consider topiramate ramp to 75mg.  Discuss with psychiatrist first.    Birth control after surgery discussed. Patient instructed that 2 forms of birth control required after surgery and to avoid pregnancy for at least 18 months after surgery:     NEXT VISITS: A  should reach out to you to schedule the following appointments.  If they do not reach you please call [phone] to schedule the following appointments:    -See dietitian in 1 month and monthly for 3 months    -See Odette in 3 months to follow up on pre-op weight loss and weight loss medications    -See Dr Gaffney after psych eval and letter from psychiatrist.  Call [phone] to schedule    Today in the office we discussed gastric sleeve surgery. Preoperative, perioperative, and postoperative processes, management, and follow up were addressed.  Risks and benefits were outlined including the risk of death, staple line leak (1-2%),  PE, DVT, ulcer, worsening GERD, N/V, stricture, hernia, wound infection, weight regain, and vitamin deficiencies. I emphasized exercise and activity along with appropriate food choice as the main foundation for weight loss with surgery providing surgical reinforcement of this.  All questions were answered.  A goal sheet and support group handout were given to the patient.      Once the patient has completed the requirements in their task list and there are no further recommendations, the pt will be allowed to see the surgeon of her choice for consultation on the laparoscopic gastric sleeve surgery. Patient verbalizes understanding of the process to surgery and expectations for the postoperative period including the need for lifelong lifestyle changes, vitamin supplementation, and laboratory monitoring.    Sincerely,     Odette More PA-C

## 2021-12-07 ENCOUNTER — OFFICE VISIT (OUTPATIENT)
Dept: OTOLARYNGOLOGY | Facility: CLINIC | Age: 51
End: 2021-12-07
Payer: COMMERCIAL

## 2021-12-07 ENCOUNTER — TELEPHONE (OUTPATIENT)
Dept: OTOLARYNGOLOGY | Facility: CLINIC | Age: 51
End: 2021-12-07

## 2021-12-07 DIAGNOSIS — R21 RASH AND NONSPECIFIC SKIN ERUPTION: Primary | ICD-10-CM

## 2021-12-07 PROCEDURE — 99213 OFFICE O/P EST LOW 20 MIN: CPT | Performed by: OTOLARYNGOLOGY

## 2021-12-07 RX ORDER — TRIAMCINOLONE ACETONIDE 1 MG/G
OINTMENT TOPICAL 2 TIMES DAILY
Qty: 15 G | Refills: 1 | Status: SHIPPED | OUTPATIENT
Start: 2021-12-07 | End: 2022-06-30

## 2021-12-07 NOTE — LETTER
2021       RE: Zayra Ramirez  [address] [place] [address]     Dear Colleague,    Thank you for referring your patient, Zayra Ramirez, to the [facility] [place] at [facility]. Please see a copy of my visit note below.      Otolaryngology Clinic    Name: Zayra Ramirez  MRN: [id number]  Age: 51 year old  : 2021      Chief Complaint:   Follow up     History of Present Illness:   Zayra Ramirez is a 51 year old female who presents for follow up. I last saw the patient on 10/26/2021 via virtual visit, at which time, she reported relief from her current medications. However, in addition to her aphthous ulcers, she also reported dysphonia. We decided on observation, but on 2021, the patient reached out via My Chart reporting extreme sensitivity and soreness on the right underside of her tongue. Presentation for biopsy was recommended and therefore she presents today.     Today, the patient reports that the area of her tongue has a very sensitive area. This has been bothering her for approximately 10 days and today she presents for a biopsy. The patient states that she was feeling well, but then woke up with severe pain under her tongue like the ulceration had returned. She used the magic mouthwash, but this only relieved the pain for approximately 30 minutes.      Review of Systems:   Pertinent items are noted in HPI or as in patient entered ROS below, remainder of complete ROS is negative.   [facility] ROS 2021   Constitutional Weight loss   Neurology Numbness   Psychology -   Eyes Double vision   Ears, Nose, Throat Ringing/noise in ears, Sore throat, Trouble swallowing, Hoarseness   Cardiopulmonary -   Gastrointestinal/Genitourinary Heartburn/indigestion, Diarrhea   Musculoskeletal Sore or stiff joints, Back pain, Neck pain   Allergy/Immunology Allergies or hay fever   Hematologic -    Endocrine -   Skin -   Other -      Physical Exam:   LMP  (LMP Unknown)      PHYSICAL EXAMINATION:    Constitutional:  The patient was unaccompanied, well-groomed, and in no acute distress.    Skin:  Warm and pink.    Neurologic:  Alert and oriented x 3.  CN's III-XII within normal limits.  Voice normal.   Psychiatric:  The patient's affect was calm, cooperative, and appropriate.    Respiratory:  Breathing comfortably without stridor or exertion of accessory muscles.    Eyes: Extraocular movement intact.    Head:  Normocephalic and atraumatic.  No lesions or scars.    Nose:  Sinuses were non-tender.  Anterior rhinoscopy revealed midline septum and absence of purulence or polyps.    OC/OP:  Normal tongue, floor of mouth, buccal mucosa, and palate.  No lesions or masses on inspection or palpation.  No abnormal lymph tissue in the oropharynx.  The pterygoid region is non-tender. Tongue with some discoloration that is pigmented.  Neck:  Supple with normal laryngeal and tracheal landmarks.  The parotid beds were without masses.  No palpable thyroid.  Lymphatic:  There is no palpable lymphadenopathy in the neck.      Assessment and Plan:    ICD-10-CM    1. Rash and nonspecific skin eruption  R21 triamcinolone (KENALOG) 0.1 % external ointment     Zayra Ramirez is a 51 year old female who presents for follow up. I reassured her that the chronic ulcer has resolved. There was not a clear area for biopsy and therefore we opted for a prescription anti-inflammatory that she can place a small amount on her tongue for 14 days in the evening. Phone or video visit okay for follow up.     Follow-up: Return in about 10 weeks (around 2/15/2022) for using a phone visit, using a video visit.      Scribe Disclosure:  Tatianna RICO, am serving as a scribe to document services personally performed by Morro Mijares MD at this visit, based upon the provider's statements to me. All documentation has been reviewed by the  aforementioned provider prior to being entered into the official medical record.     Scribe Preparation Attestation:  I, Tatianna Jaffe, a scribe, prepared the chart for today's encounter.       Again, thank you for allowing me to participate in the care of your patient.      Sincerely,    Morro Mijares MD

## 2021-12-07 NOTE — TELEPHONE ENCOUNTER
LVM for patient regarding scheduling a Return in about 10 weeks (around 2/15/2022) for using a phone visit, using a video visit, Follow up. Provided ENT Clinic number for scheduling.

## 2021-12-07 NOTE — PROGRESS NOTES
Otolaryngology Clinic    Name: Zayra Ramirez  MRN: [id number]  Age: 51 year old  : 2021      Chief Complaint:   Follow up     History of Present Illness:   Zayra Ramirez is a 51 year old female who presents for follow up. I last saw the patient on 10/26/2021 via virtual visit, at which time, she reported relief from her current medications. However, in addition to her aphthous ulcers, she also reported dysphonia. We decided on observation, but on 2021, the patient reached out via My Chart reporting extreme sensitivity and soreness on the right underside of her tongue. Presentation for biopsy was recommended and therefore she presents today.     Today, the patient reports that the area of her tongue has a very sensitive area. This has been bothering her for approximately 10 days and today she presents for a biopsy. The patient states that she was feeling well, but then woke up with severe pain under her tongue like the ulceration had returned. She used the magic mouthwash, but this only relieved the pain for approximately 30 minutes.      Review of Systems:   Pertinent items are noted in HPI or as in patient entered ROS below, remainder of complete ROS is negative.   [facility] ROS 2021   Constitutional Weight loss   Neurology Numbness   Psychology -   Eyes Double vision   Ears, Nose, Throat Ringing/noise in ears, Sore throat, Trouble swallowing, Hoarseness   Cardiopulmonary -   Gastrointestinal/Genitourinary Heartburn/indigestion, Diarrhea   Musculoskeletal Sore or stiff joints, Back pain, Neck pain   Allergy/Immunology Allergies or hay fever   Hematologic -   Endocrine -   Skin -   Other -      Physical Exam:   LMP  (LMP Unknown)      PHYSICAL EXAMINATION:    Constitutional:  The patient was unaccompanied, well-groomed, and in no acute distress.    Skin:  Warm and pink.    Neurologic:  Alert and oriented x 3.  CN's III-XII within normal limits.  Voice normal.   Psychiatric:  The  patient's affect was calm, cooperative, and appropriate.    Respiratory:  Breathing comfortably without stridor or exertion of accessory muscles.    Eyes: Extraocular movement intact.    Head:  Normocephalic and atraumatic.  No lesions or scars.    Nose:  Sinuses were non-tender.  Anterior rhinoscopy revealed midline septum and absence of purulence or polyps.    OC/OP:  Normal tongue, floor of mouth, buccal mucosa, and palate.  No lesions or masses on inspection or palpation.  No abnormal lymph tissue in the oropharynx.  The pterygoid region is non-tender. Tongue with some discoloration that is pigmented.  Neck:  Supple with normal laryngeal and tracheal landmarks.  The parotid beds were without masses.  No palpable thyroid.  Lymphatic:  There is no palpable lymphadenopathy in the neck.      Assessment and Plan:    ICD-10-CM    1. Rash and nonspecific skin eruption  R21 triamcinolone (KENALOG) 0.1 % external ointment     Zayra Rmairez is a 51 year old female who presents for follow up. I reassured her that the chronic ulcer has resolved. There was not a clear area for biopsy and therefore we opted for a prescription anti-inflammatory that she can place a small amount on her tongue for 14 days in the evening. Phone or video visit okay for follow up.     Follow-up: Return in about 10 weeks (around 2/15/2022) for using a phone visit, using a video visit.      Scribe Disclosure:  Tatianna RICO, am serving as a scribe to document services personally performed by Morro Mijares MD at this visit, based upon the provider's statements to me. All documentation has been reviewed by the aforementioned provider prior to being entered into the official medical record.     Scribe Preparation Attestation:  Tatianna RICO, a scribe, prepared the chart for today's encounter.

## 2021-12-08 ENCOUNTER — TELEPHONE (OUTPATIENT)
Dept: SURGERY | Facility: CLINIC | Age: 51
End: 2021-12-08

## 2021-12-08 ENCOUNTER — VIRTUAL VISIT (OUTPATIENT)
Dept: PHYSICAL MEDICINE AND REHAB | Facility: CLINIC | Age: 51
End: 2021-12-08
Payer: COMMERCIAL

## 2021-12-08 DIAGNOSIS — E88.09 HYPOALBUMINEMIA: Primary | ICD-10-CM

## 2021-12-08 DIAGNOSIS — R26.89 BALANCE PROBLEMS: ICD-10-CM

## 2021-12-08 PROCEDURE — 99204 OFFICE O/P NEW MOD 45 MIN: CPT | Mod: 95 | Performed by: PHYSICAL MEDICINE & REHABILITATION

## 2021-12-08 ASSESSMENT — ANXIETY QUESTIONNAIRES
7. FEELING AFRAID AS IF SOMETHING AWFUL MIGHT HAPPEN: SEVERAL DAYS
GAD7 TOTAL SCORE: 7
7. FEELING AFRAID AS IF SOMETHING AWFUL MIGHT HAPPEN: SEVERAL DAYS
1. FEELING NERVOUS, ANXIOUS, OR ON EDGE: SEVERAL DAYS
3. WORRYING TOO MUCH ABOUT DIFFERENT THINGS: SEVERAL DAYS
6. BECOMING EASILY ANNOYED OR IRRITABLE: SEVERAL DAYS
5. BEING SO RESTLESS THAT IT IS HARD TO SIT STILL: SEVERAL DAYS
GAD7 TOTAL SCORE: 7
4. TROUBLE RELAXING: SEVERAL DAYS
2. NOT BEING ABLE TO STOP OR CONTROL WORRYING: SEVERAL DAYS
GAD7 TOTAL SCORE: 7

## 2021-12-08 ASSESSMENT — PATIENT HEALTH QUESTIONNAIRE - PHQ9
SUM OF ALL RESPONSES TO PHQ QUESTIONS 1-9: 7
10. IF YOU CHECKED OFF ANY PROBLEMS, HOW DIFFICULT HAVE THESE PROBLEMS MADE IT FOR YOU TO DO YOUR WORK, TAKE CARE OF THINGS AT HOME, OR GET ALONG WITH OTHER PEOPLE: SOMEWHAT DIFFICULT
SUM OF ALL RESPONSES TO PHQ QUESTIONS 1-9: 7

## 2021-12-08 NOTE — PROGRESS NOTES
Zayra is a 51 year old who is being evaluated via a billable video visit.      How would you like to obtain your AVS? MyChart  If the video visit is dropped, the invitation should be resent by: Send to e-mail at: [email or web address]  Will anyone else be joining your video visit? No      Video-Visit Details    Type of service:  Video Visit    Originating Location (pt. Location): Home    Distant Location (provider location):  [facility] PHYSICAL MEDICINE AND REHABILITATION CLINIC [place]     Platform used for Video Visit: [employer]              PM&R Clinic Note     Patient Name: Zayra Ramirez : 1970 Medical Record: [id number]     Requesting Physician/clinician: No att. providers found           History of Present Illness:     Zayra Ramirez is a 51 year old female referred by Dr. Wilhelm for bone health/functional assessment prior to possible lumbar spine surgery.  She has a long-standing history of  Spine pain and is treating with pain for these symptoms as well as spine surgery.  She has recently stopped smoking and is working on weight loss.  She does have risk factors for osteoporosis including prednisone use (5 years ago) for pulmonary dysfuction.  She recently had a DXA that demonstrated normal BMD at the bilateral hips.  The Lumbar spine was excluded due to degenerative changes.  She has no fracture history, has not taken any medications for osteoporosis or HRT in the past.  She does have a history of vitamin D deficiency and is taking vitamin D and calcium supplements.  She reports balance problems and exercise intolerance but no history of injurious falls.     Date of Surgery:  Potentially Spring 2022  Age: 51    Fracture History: No    Age over 50 (female) or 75 (male): Yes, 51  Prior failed surgery (fracture, pseudarthrosis, or instrumentation failure):  No  Personal or family hx of previous low trauma fx of the hip or spine: No  Diabetes Mellitus greater than 10 years or poor  control:  No  Inflammatory arthritis:  Rheumatoid arthritis  Chronic corticosteroid use (5mg/day o for more than 90 days):  Yes, for pulmonary reasons, 5 years ago  Chronic kidney disease stage 3 and up:  No  Limited mobility: No  Smoking:   Recently quit  Alcohol use 3 or more per day:  No  Personal or family hx of metabolic bone disease:  None  Liver disease:  No  Oophorectomy:  No  Cancer tx: No  Vitamin D Deficiency:  Yes, takes vitamin D supplements  Hx weight loss surgery:  No  Hx frequent falls:  No    Regular Physical Activity:  No    Osteoporosis Medication Use:  No, no history of HRT  Noted loss of height:  5'4'' in 20s, 5'3.5'' now  Dietary Intake:  Cheese, doesn't drink milk, leafy green vegetables 1-2 times per week.  Reproductive History:  2 live births (age 18 and 26)      LISA-7 SCORE 6/11/2021 7/8/2021 12/8/2021   Total Score - - 7 (mild anxiety)   Total Score 14 14 7     PHQ 6/11/2021 7/8/2021 12/8/2021   PHQ-9 Total Score 16 16 7   Q9: Thoughts of better off dead/self-harm past 2 weeks Not at all Not at all Not at all            Past Medical and Surgical History:     Past Medical History:   Diagnosis Date     Allergic rhinitis      Anemia      Arthritis      Asthma     copd     Dental abscess 8-2015     Depressive disorder      Gastroesophageal reflux disease      History of emphysema      Hoarseness      Hypertension      Morbid obesity with BMI of 40.0-44.9, adult (H)      Obstructive sleep apnea      Other chronic pain      Respiratory bronchiolitis interstitial lung disease (H)      Sleep apnea      Past Surgical History:   Procedure Laterality Date     CERVICAL FUSION       COLONOSCOPY       COLONOSCOPY N/A 02/06/2020    Procedure: COLONOSCOPY, WITH POLYPECTOMY AND BIOPSY;  Surgeon: Julian Mccullough MD;  Location:  GI     ENT SURGERY       ESOPHAGOSCOPY, GASTROSCOPY, DUODENOSCOPY (EGD), COMBINED N/A 02/06/2020    Procedure: ESOPHAGOGASTRODUODENOSCOPY (EGD);  Surgeon: Fausto Sotomayor  MD Julian;  Location: [facility] GI     EXCISE LESION INTRAORAL Bilateral 10/03/2018    Procedure: EXCISE LESION INTRAORAL;  Wide Local Excision Of of Left Oral Cavity Ulcer;  Surgeon: Morro Mijares MD;  Location: [facility] OR     HC DRAIN SKIN ABSCESS SIMPLE/SINGLE  03/16/2012    Procedure:INCISION AND DRAINAGE, ABSCESS, SIMPLE; Surgeon:CHRISTIANO HANCOCK; Location: GI     HEAD & NECK SURGERY       HYSTERECTOMY       HYSTERECTOMY       INCISION AND DRAINAGE ABDOMEN WASHOUT, COMBINED       INJECT EPIDURAL CERVICAL N/A 10/14/2021    Procedure: Cervical 7- Thoracic 1 epidural steroid injection with fluoroscopy;  Surgeon: Tram Florian MD;  Location: [facility] OR     INJECT EPIDURAL LUMBAR Right 09/15/2020    Procedure: Lumbar5- sacral 1 epidural steroid injection with fluoroscopy;  Surgeon: Tram Florian MD;  [facility]     INJECT EPIDURAL LUMBAR Right 06/29/2021    Procedure: Lumbar 5 sacral 1 epidural steroid injection with fluoroscopy;  Surgeon: Tram Florian MD;  Location: [facility] OR     INJECT SACROILIAC JOINT Bilateral 06/16/2020    Procedure: Bilateral sacroiliac joint steroid injection with fluoroscopy;  Surgeon: Tram Florian MD;  [facility]     LAMINECTOMY THORACIC ONE LEVEL N/A 08/19/2019    Procedure: LAMINECTOMY, SPINE, THORACIC, 11-12 and Part of Lumbar 1, DRAINAGE OF EPIDURAL ABCESS, Epidural Drain Placement X 2;  Surgeon: Yadiel Beal MD;  Location: [facility] OR     MI PERCUT IMPLNT NEUROELECT,EPIDURAL N/A 08/08/2019    Procedure: TRIAL, SPINAL CORD STIMULATOR WITH BOSTON SCIENTIFIC;  Surgeon: Sipple, Daniel Peter, DO;  [facility]: [facility];  Service: Pain     RADIO FREQUENCY ABLATION / DESTRUCTION OF SACROILOAC JOINT DORSAL PRIMARY RAMUS Bilateral 12/17/2019    Procedure: Bilateral lumbar radiofrequency ablation with fluoroscopy and intravenous sedation ( Lumbar 2,3,4,5 medial branch nerves for the bilateral lumbar3-4, 4-5 and 5-sacral1 joints.;  Surgeon: Tram Florian MD;   [facility]     RADIO FREQUENCY ABLATION / DESTRUCTION OF SACROILOAC JOINT DORSAL PRIMARY RAMUS Right 2020    Procedure: Right lumbar medial branch nerve radiofrequency ablation right L2,3,4,5 nerves supplying the right L3-4, L4-5 and L5-S1 facet joints;  Surgeon: Tram Florian MD;  Location: [facility] OR     spinal cord stimulator  2019     spinal cord stimulator removal  2019            Social History:     Social History     Tobacco Use     Smoking status: Former Smoker     Packs/day: 1.00     Years: 30.00     Pack years: 30.00     Types: Cigarettes     Start date: 1996     Quit date: 2021     Years since quittin.0     Smokeless tobacco: Never Used   Substance Use Topics     Alcohol use: No       Marital Status: In a relationship, 2 children   Living situation: Lives at home with boyfriend and roomate  Family support: father and step mother in area  Vocational History: /, doesn't work in winter  Tobacco use: recently quit, 30 pack year history  Alcohol use: none  Recreational drug use: [facility] for pain         Functional history:     ADLs: independent  Assistive devices: none  iADLs (medication management and finances): independent         Family History:     Family History   Problem Relation Age of Onset     Asthma Mother      Restless Leg Syndrome Mother      Other Cancer Father         base of tongue cancer at age ~65     Hypertension Father      Back Pain Father      Restless Leg Syndrome Father      Coronary Artery Disease Father      Restless Leg Syndrome Sister      Breast Cancer Maternal Aunt 55     Anesthesia Reaction No family hx of      Deep Vein Thrombosis (DVT) No family hx of             Medications:     Current Outpatient Medications   Medication Sig Dispense Refill     albuterol (PROAIR HFA, PROVENTIL HFA, VENTOLIN HFA) 108 (90 BASE) MCG/ACT inhaler Inhale 2 puffs into the lungs every 6 hours as needed for shortness of breath / dyspnea or  wheezing (PT last dose 1.23.2020)        busPIRone (BUSPAR) 15 MG tablet Take 15 mg by mouth every morning Also takes 30 mg at bedtime - see other order.       busPIRone HCl (BUSPAR) 30 MG tablet Take 30 mg by mouth At Bedtime Also takes 15 mg in the morning - see other order.       carboxymethylcellulose (CARBOXYMETHYLCELLULOSE SODIUM) 0.5 % SOLN ophthalmic solution Place 1 drop into both eyes 3 times daily as needed for dry eyes 15 mL 11     celecoxib (CELEBREX) 200 MG capsule Take 1 capsule (200 mg) by mouth every morning 60 capsule 4     cetirizine (ZYRTEC) 10 MG tablet Take 1 tablet (10 mg) by mouth daily 69 tablet 3     cholecalciferol ( ULTRA STRENGTH) 2000 units CAPS Take 2,000 Units by mouth every evening        cholestyramine (QUESTRAN) 4 GM/DOSE powder Take 4 grams once per day at least 2 hours after and at lease 2 hours before eating or taking medications. 378 g 0     cyclobenzaprine (FLEXERIL) 10 MG tablet Take 1 tablet (10 mg) by mouth daily (Patient taking differently: Take 10 mg by mouth At Bedtime ) 90 tablet 1     DULoxetine (CYMBALTA) 60 MG capsule Take 120 mg by mouth At Bedtime        EPINEPHrine (EPIPEN/ADRENACLICK/OR ANY BX GENERIC EQUIV) 0.3 MG/0.3ML injection 2-pack INJECT 0.3ML INTO THE MUSCLE ONCE AS NEEDED FOR ANAPHYLAXIS       ferrous sulfate (FEROSUL) 325 (65 Fe) MG tablet Take 325 mg by mouth daily (with breakfast)   0     folic acid (FOLVITE) 1 MG tablet Take 1 tablet (1 mg) by mouth daily 90 tablet 3     ibuprofen (ADVIL/MOTRIN) 200 MG tablet Take 400 mg by mouth every 8 hours as needed for mild pain       lisinopril-hydrochlorothiazide (ZESTORETIC) 20-25 MG tablet Take 1 tablet by mouth daily (Patient taking differently: Take 1 tablet by mouth every evening ) 90 tablet 3     methotrexate sodium 2.5 MG TABS TAKE 9 TABLETS BY MOUTH ONCE WEEKLY  Labs  past due. appt past due. (Patient taking differently: TAKE 9 TABLETS BY MOUTH ONCE WEEKLY  Labs  past due. appt past due.  Takes on  "Mondays.) 108 tablet 5     montelukast (SINGULAIR) 10 MG tablet Take 10 mg by mouth At Bedtime       nystatin (MYCOSTATIN) 073561 UNIT/GM external cream Apply topically 2 times daily (Patient taking differently: Apply topically daily as needed ) 30 g 3     omeprazole (PRILOSEC) 40 MG DR capsule Take 1 capsule (40 mg) by mouth daily (Patient taking differently: Take 40 mg by mouth every evening ) 180 capsule 3     OXYGEN-HELIUM IN 2-3L PRN during the day, 3L @ night    Oxygen only not helium       pregabalin (LYRICA) 150 MG capsule Take 1 capsule (150 mg) by mouth 2 times daily 60 capsule 0     Respiratory Therapy Supplies ([facility] CPAP FILTER) MISC        rOPINIRole (REQUIP) 0.5 MG tablet Take 1 tablet (0.5 mg) by mouth At Bedtime Take 1 tab by mouth at bedtime. 90 tablet 1     triamcinolone (KENALOG) 0.1 % external ointment Apply topically 2 times daily 15 g 1     vitamin C 500 MG TABS Take 500 mg by mouth daily (with lunch)        zinc sulfate (ZINCATE) 220 (50 Zn) MG capsule Take 220 mg by mouth daily (with lunch)               Allergies:     Allergies   Allergen Reactions     Bee Venom Anaphylaxis     Bees      Doxycycline Anaphylaxis     Patient thinks it may have been just nausea and vomiting, however unable to confirm     Erythromycin Anaphylaxis and Shortness Of Breath     Other reaction(s): Vomiting     Hydrocodone-Acetaminophen Itching              ROS:     A focused ROS is negative other than the symptoms noted above in the HPI.           Physical Examiniation:     VITAL SIGNS: LMP  (LMP Unknown)   BMI: Estimated body mass index is 39.08 kg/m  as calculated from the following:    Height as of 12/6/21: 1.609 m (5' 3.34\").    Weight as of 12/6/21: 101.2 kg (223 lb).    Gen: NAD, pleasant and cooperative            Laboratory/Imaging:      Ref Range & Units 5 mo ago   (7/10/21) 8 mo ago   (4/8/21) 1 yr ago   (9/30/20) 1 yr ago   (2/26/20) 2 yr ago   (9/23/19) 2 yr ago   (9/16/19) 2 yr ago "   (9/9/19)     Albumin 3.4 - 5.0 g/dL 3.3 Low   3.0 Low   3.5  3.5  3.5  3.5  3.5       Ref Range & Units 2 yr ago   VITAMIN D TOTAL 30.0 - 80.0 ng/mL 38.6    Resulting Agency  [facility]     Narrative  Performed by [facility]  Deficiency:         <20 ng/mL   Insufficiency:      20-29 ng/mL       [address] [address]  Phone: [phone]   Fax: [phone]        Patient name:                          Zayra Ramirez  Patient demographics:            51 year old White Female   History:                                    Family History of Osteoporosis, Hysterectomy, Low Vit D, Rheumatoid Arthritis and Tobacco Habit - Past  Current treatments:                 Vitamin D  Scan:                                       Vettery           Impression  Based on BMD diagnosis is consistent with low bone density based on WHO criteria Ref. 1     Results   Lumbar spine was excluded due to degenerative changes     Left femoral neck  T-score -1.1      Right femoral neck  T-score -0.8      Left Total femur  T-score -0.2 , BMD is 0.986 g/cm2.     Right total femur  T-score -0.2, BMD is 0.985 g/cm2.     Radius: T-score 1.1, BMD 0.763 g/cm2     Interval change  No prior study available for comparison     Repeat  Recommend repeat DXA in 5 years.     Principal result :  Mary Arevalo MD, [facility]ADA  Division of Endocrinology and Diabetes    Department of Medicine     Technical quality  Satisfactory.   The spine was excluded from analysis. A distal radius was added as additional site of analysis. Results of the the distal radius are taken inconsideration in this report, for images and BMD values compare separate PACS report     References:  Ref. 1. WHO categories:          T-score > -1.0  = normal             .                                T-score -1.0 to -2.5 = low bone  density  T-score < -2.5 = osteoporosis        .     Ref. 2. 2015 ISCD official position statements:  www.iscd.org.     Ref. 3.  Today's examination is compared to the technically similar prior study of the total hip and femur if available. Only changes deemed likely to be significant based on historical data are reported.  According to the ISCD position statements, total hip rather than femoral neck regions are to be compared because larger areas give better precision.  LSC = least significant changes at the [facility] (historical data)                          AP spine =  0.032 g/cm2  (6/26/2007)                          Left hip = 0.029 g/cm2  (6/15/2007)                          Right hip = 0.018 g/cm2  (6/15/2007)                          Left mid radius = 0.043 g/cm2  (6/26/2007)  Please note that the differential diagnosis of increase in bone density at the lumbar spine includes improvement due to pharmacotherapy vs inter-current progression of spine degeneration or fracture.     Ref. 4 Fracture risk is calculated in patients aged 40 to 90 years old with low bone density not on osteoporosis treatment. A 10 year fracture risk of 3% and higher for hip fracture and 20% and higher for major osteoporotic fracture is considered higher than acceptable risk and might be an indication for medical treatment.     Ref. 5.  By definition, osteoporosis may be diagnosed in the presence or with the history of a low trauma or fragility fracture.  Fragility and low trauma fracture is defined as a fracture resulting from the force of a fall from a standing height or less or a bone that breaks under conditions that would not cause a normal bone to break.       Ref. 6. NOF Physician's Guideline Website address:  www.nof.org.           Assessment/Plan:     50 YO female with history of back pain considering lumbar surgery who is referred for bone health assessment/ assessment of functional impairments prior to surgery.  DXA  is normal however, HU of lumbar spine in 110-160 range depending upon the sampling region.   Will discuss bone health considerations with Dr. Wilhelm.  Will order PT for her now and will review vitamin D levels when available.  Hypoalbuminemia noted on review of labs, will place a nutrition consult.  Follow-up in 6 weeks.      Yeimy Guerra, DO  Physical Medicine & Rehabilitation    I spent a total of 25 minutes with Zayra Ramirez during today's virtual video office visit. Over 50% of this time was spent counseling the patient and/or coordinating care. See note for details.     20 minutes spent on the date of the encounter doing chart review, history and exam, documentation and further activities as noted above

## 2021-12-08 NOTE — LETTER
2021       RE: Zayra Ramirez  [address] [place] [address]     Dear Colleague,    Thank you for referring your patient, Zayra Ramirez, to the [facility] PHYSICAL MEDICINE AND REHABILITATION CLINIC [place] [facility]. Please see a copy of my visit note below.         PM&R Clinic Note     Patient Name: Zayra Ramirez : 1970 Medical Record: [id number]     Requesting Physician/clinician: No att. providers found           History of Present Illness:     Zayra Ramirez is a 51 year old female referred by Dr. Wilhelm for bone health/functional assessment prior to possible lumbar spine surgery.  She has a long-standing history of  Spine pain and is treating with pain for these symptoms as well as spine surgery.  She has recently stopped smoking and is working on weight loss.  She does have risk factors for osteoporosis including prednisone use (5 years ago) for pulmonary dysfuction.  She recently had a DXA that demonstrated normal BMD at the bilateral hips.  The Lumbar spine was excluded due to degenerative changes.  She has no fracture history, has not taken any medications for osteoporosis or HRT in the past.  She does have a history of vitamin D deficiency and is taking vitamin D and calcium supplements.  She reports balance problems and exercise intolerance but no history of injurious falls.     Date of Surgery:  Potentially Spring 2022  Age: 51    Fracture History: No    Age over 50 (female) or 75 (male): Yes, 51  Prior failed surgery (fracture, pseudarthrosis, or instrumentation failure):  No  Personal or family hx of previous low trauma fx of the hip or spine: No  Diabetes Mellitus greater than 10 years or poor control:  No  Inflammatory arthritis:  Rheumatoid arthritis  Chronic corticosteroid use (5mg/day o for more than 90 days):  Yes, for pulmonary reasons, 5 years ago  Chronic kidney disease stage 3 and up:  No  Limited  mobility: No  Smoking:   Recently quit  Alcohol use 3 or more per day:  No  Personal or family hx of metabolic bone disease:  None  Liver disease:  No  Oophorectomy:  No  Cancer tx: No  Vitamin D Deficiency:  Yes, takes vitamin D supplements  Hx weight loss surgery:  No  Hx frequent falls:  No    Regular Physical Activity:  No    Osteoporosis Medication Use:  No, no history of HRT  Noted loss of height:  5'4'' in 20s, 5'3.5'' now  Dietary Intake:  Cheese, doesn't drink milk, leafy green vegetables 1-2 times per week.  Reproductive History:  2 live births (age 18 and 26)      LISA-7 SCORE 6/11/2021 7/8/2021 12/8/2021   Total Score - - 7 (mild anxiety)   Total Score 14 14 7     PHQ 6/11/2021 7/8/2021 12/8/2021   PHQ-9 Total Score 16 16 7   Q9: Thoughts of better off dead/self-harm past 2 weeks Not at all Not at all Not at all            Past Medical and Surgical History:     Past Medical History:   Diagnosis Date     Allergic rhinitis      Anemia      Arthritis      Asthma     copd     Dental abscess 8-2015     Depressive disorder      Gastroesophageal reflux disease      History of emphysema      Hoarseness      Hypertension      Morbid obesity with BMI of 40.0-44.9, adult (H)      Obstructive sleep apnea      Other chronic pain      Respiratory bronchiolitis interstitial lung disease (H)      Sleep apnea      Past Surgical History:   Procedure Laterality Date     CERVICAL FUSION       COLONOSCOPY       COLONOSCOPY N/A 02/06/2020    Procedure: COLONOSCOPY, WITH POLYPECTOMY AND BIOPSY;  Surgeon: Julian Mccullough MD;  Location:  GI     ENT SURGERY       ESOPHAGOSCOPY, GASTROSCOPY, DUODENOSCOPY (EGD), COMBINED N/A 02/06/2020    Procedure: ESOPHAGOGASTRODUODENOSCOPY (EGD);  Surgeon: Julian Mccullough MD;  Location:  GI     EXCISE LESION INTRAORAL Bilateral 10/03/2018    Procedure: EXCISE LESION INTRAORAL;  Wide Local Excision Of of Left Oral Cavity Ulcer;  Surgeon: Morro Mijares MD;  Location:   OR     HC DRAIN SKIN ABSCESS SIMPLE/SINGLE  03/16/2012    Procedure:INCISION AND DRAINAGE, ABSCESS, SIMPLE; Surgeon:CHRISTIANO HANCOCK; Location: GI     HEAD & NECK SURGERY       HYSTERECTOMY       HYSTERECTOMY       INCISION AND DRAINAGE ABDOMEN WASHOUT, COMBINED       INJECT EPIDURAL CERVICAL N/A 10/14/2021    Procedure: Cervical 7- Thoracic 1 epidural steroid injection with fluoroscopy;  Surgeon: Tram Florian MD;  Location: [facility] OR     INJECT EPIDURAL LUMBAR Right 09/15/2020    Procedure: Lumbar5- sacral 1 epidural steroid injection with fluoroscopy;  Surgeon: Tram Florian MD;  [facility]     INJECT EPIDURAL LUMBAR Right 06/29/2021    Procedure: Lumbar 5 sacral 1 epidural steroid injection with fluoroscopy;  Surgeon: Tram Florian MD;  Location: [facility] OR     INJECT SACROILIAC JOINT Bilateral 06/16/2020    Procedure: Bilateral sacroiliac joint steroid injection with fluoroscopy;  Surgeon: Tram Florian MD;  [facility]     LAMINECTOMY THORACIC ONE LEVEL N/A 08/19/2019    Procedure: LAMINECTOMY, SPINE, THORACIC, 11-12 and Part of Lumbar 1, DRAINAGE OF EPIDURAL ABCESS, Epidural Drain Placement X 2;  Surgeon: Yadiel Beal MD;  Location: [facility] OR     SC PERCUT IMPLNT NEUROELECT,EPIDURAL N/A 08/08/2019    Procedure: TRIAL, SPINAL CORD STIMULATOR WITH BOSTON SCIENTIFIC;  Surgeon: Sipple, Daniel Peter, DO;  [facility]: [facility];  Service: Pain     RADIO FREQUENCY ABLATION / DESTRUCTION OF SACROILOAC JOINT DORSAL PRIMARY RAMUS Bilateral 12/17/2019    Procedure: Bilateral lumbar radiofrequency ablation with fluoroscopy and intravenous sedation ( Lumbar 2,3,4,5 medial branch nerves for the bilateral lumbar3-4, 4-5 and 5-sacral1 joints.;  Surgeon: Tram Florian MD;  [facility]     RADIO FREQUENCY ABLATION / DESTRUCTION OF SACROILOAC JOINT DORSAL PRIMARY RAMUS Right 11/17/2020    Procedure: Right lumbar medial branch nerve radiofrequency ablation right L2,3,4,5 nerves supplying the  right L3-4, L4-5 and L5-S1 facet joints;  Surgeon: Tram Florian MD;  Location: [facility] OR     spinal cord stimulator  2019     spinal cord stimulator removal  2019            Social History:     Social History     Tobacco Use     Smoking status: Former Smoker     Packs/day: 1.00     Years: 30.00     Pack years: 30.00     Types: Cigarettes     Start date: 1996     Quit date: 2021     Years since quittin.0     Smokeless tobacco: Never Used   Substance Use Topics     Alcohol use: No       Marital Status: In a relationship, 2 children   Living situation: Lives at home with boyfriend and roomate  Family support: father and step mother in area  Vocational History: /, doesn't work in winter  Tobacco use: recently quit, 30 pack year history  Alcohol use: none  [facility]         Functional history:     ADLs: independent  Assistive devices: none  iADLs (medication management and finances): independent         Family History:     Family History   Problem Relation Age of Onset     Asthma Mother      Restless Leg Syndrome Mother      Other Cancer Father         base of tongue cancer at age ~65     Hypertension Father      Back Pain Father      Restless Leg Syndrome Father      Coronary Artery Disease Father      Restless Leg Syndrome Sister      Breast Cancer Maternal Aunt 55     Anesthesia Reaction No family hx of      Deep Vein Thrombosis (DVT) No family hx of             Medications:     Current Outpatient Medications   Medication Sig Dispense Refill     albuterol (PROAIR HFA, PROVENTIL HFA, VENTOLIN HFA) 108 (90 BASE) MCG/ACT inhaler Inhale 2 puffs into the lungs every 6 hours as needed for shortness of breath / dyspnea or wheezing (PT last dose 2020)        busPIRone (BUSPAR) 15 MG tablet Take 15 mg by mouth every morning Also takes 30 mg at bedtime - see other order.       busPIRone HCl (BUSPAR) 30 MG tablet Take 30 mg by mouth At Bedtime Also  takes 15 mg in the morning - see other order.       carboxymethylcellulose (CARBOXYMETHYLCELLULOSE SODIUM) 0.5 % SOLN ophthalmic solution Place 1 drop into both eyes 3 times daily as needed for dry eyes 15 mL 11     celecoxib (CELEBREX) 200 MG capsule Take 1 capsule (200 mg) by mouth every morning 60 capsule 4     cetirizine (ZYRTEC) 10 MG tablet Take 1 tablet (10 mg) by mouth daily 69 tablet 3     cholecalciferol ( ULTRA STRENGTH) 2000 units CAPS Take 2,000 Units by mouth every evening        cholestyramine (QUESTRAN) 4 GM/DOSE powder Take 4 grams once per day at least 2 hours after and at lease 2 hours before eating or taking medications. 378 g 0     cyclobenzaprine (FLEXERIL) 10 MG tablet Take 1 tablet (10 mg) by mouth daily (Patient taking differently: Take 10 mg by mouth At Bedtime ) 90 tablet 1     DULoxetine (CYMBALTA) 60 MG capsule Take 120 mg by mouth At Bedtime        EPINEPHrine (EPIPEN/ADRENACLICK/OR ANY BX GENERIC EQUIV) 0.3 MG/0.3ML injection 2-pack INJECT 0.3ML INTO THE MUSCLE ONCE AS NEEDED FOR ANAPHYLAXIS       ferrous sulfate (FEROSUL) 325 (65 Fe) MG tablet Take 325 mg by mouth daily (with breakfast)   0     folic acid (FOLVITE) 1 MG tablet Take 1 tablet (1 mg) by mouth daily 90 tablet 3     ibuprofen (ADVIL/MOTRIN) 200 MG tablet Take 400 mg by mouth every 8 hours as needed for mild pain       lisinopril-hydrochlorothiazide (ZESTORETIC) 20-25 MG tablet Take 1 tablet by mouth daily (Patient taking differently: Take 1 tablet by mouth every evening ) 90 tablet 3     methotrexate sodium 2.5 MG TABS TAKE 9 TABLETS BY MOUTH ONCE WEEKLY  Labs  past due. appt past due. (Patient taking differently: TAKE 9 TABLETS BY MOUTH ONCE WEEKLY  Labs  past due. appt past due.  Takes on Mondays.) 108 tablet 5     montelukast (SINGULAIR) 10 MG tablet Take 10 mg by mouth At Bedtime       nystatin (MYCOSTATIN) 437981 UNIT/GM external cream Apply topically 2 times daily (Patient taking differently: Apply topically  "daily as needed ) 30 g 3     omeprazole (PRILOSEC) 40 MG DR capsule Take 1 capsule (40 mg) by mouth daily (Patient taking differently: Take 40 mg by mouth every evening ) 180 capsule 3     OXYGEN-HELIUM IN 2-3L PRN during the day, 3L @ night    Oxygen only not helium       pregabalin (LYRICA) 150 MG capsule Take 1 capsule (150 mg) by mouth 2 times daily 60 capsule 0     Respiratory Therapy Supp[facility] ([facility]) MISC        rOPINIRole (REQUIP) 0.5 MG tablet Take 1 tablet (0.5 mg) by mouth At Bedtime Take 1 tab by mouth at bedtime. 90 tablet 1     triamcinolone (KENALOG) 0.1 % external ointment Apply topically 2 times daily 15 g 1     vitamin C 500 MG TABS Take 500 mg by mouth daily (with lunch)        zinc sulfate (ZINCATE) 220 (50 Zn) MG capsule Take 220 mg by mouth daily (with lunch)               Allergies:     Allergies   Allergen Reactions     Bee Venom Anaphylaxis     Bees      Doxycycline Anaphylaxis     Patient thinks it may have been just nausea and vomiting, however unable to confirm     Erythromycin Anaphylaxis and Shortness Of Breath     Other reaction(s): Vomiting     Hydrocodone-Acetaminophen Itching              ROS:     A focused ROS is negative other than the symptoms noted above in the HPI.           Physical Examiniation:     VITAL SIGNS: LMP  (LMP Unknown)   BMI: Estimated body mass index is 39.08 kg/m  as calculated from the following:    Height as of 12/6/21: 1.609 m (5' 3.34\").    Weight as of 12/6/21: 101.2 kg (223 lb).    Gen: NAD, pleasant and cooperative            Laboratory/Imaging:      Ref Range & Units 5 mo ago   (7/10/21) 8 mo ago   (4/8/21) 1 yr ago   (9/30/20) 1 yr ago   (2/26/20) 2 yr ago   (9/23/19) 2 yr ago   (9/16/19) 2 yr ago   (9/9/19)     Albumin 3.4 - 5.0 g/dL 3.3 Low   3.0 Low   3.5  3.5  3.5  3.5  3.5       Ref Range & Units 2 yr ago   VITAMIN D TOTAL 30.0 - 80.0 ng/mL 38.6    Resulting Agency  [facility] LABORATORY-CENTRAL LABORATORY "     Narrative  Performed by [facility] LABORATORY-CENTRAL LABORATORY  Deficiency:         <20 ng/mL   Insufficiency:      20-29 ng/mL       [address] [address]  Phone: [phone]   Fax: [phone]        Patient name:                          Zayra Ramirez  Patient demographics:            51 year old White Female   History:                                    Family History of Osteoporosis, Hysterectomy, Low Vit D, Rheumatoid Arthritis and Tobacco Habit - Past  Current treatments:                 Vitamin D  Scan:                                       Neon Mobile           Impression  Based on BMD diagnosis is consistent with low bone density based on WHO criteria Ref. 1     Results   Lumbar spine was excluded due to degenerative changes     Left femoral neck  T-score -1.1      Right femoral neck  T-score -0.8      Left Total femur  T-score -0.2 , BMD is 0.986 g/cm2.     Right total femur  T-score -0.2, BMD is 0.985 g/cm2.     Radius: T-score 1.1, BMD 0.763 g/cm2     Interval change  No prior study available for comparison     Repeat  Recommend repeat DXA in 5 years.     Principal result :  Mary Arevalo MD, SREEDHAR PARADA  Division of Endocrinology and Diabetes    Department of Medicine     Technical quality  Satisfactory.   The spine was excluded from analysis. A distal radius was added as additional site of analysis. Results of the the distal radius are taken inconsideration in this report, for images and BMD values compare separate PACS report     References:  Ref. 1. WHO categories:          T-score > -1.0  = normal             .                                T-score -1.0 to -2.5 = low bone density  T-score < -2.5 = osteoporosis        .     Ref. 2. 2015 ISCD official position statements:  www.iscd.org.     Ref. 3.  Today's examination is compared to the technically similar prior study of the total hip and femur  if available. Only changes deemed likely to be significant based on historical data are reported.  According to the ISCD position statements, total hip rather than femoral neck regions are to be compared because larger areas give better precision.  LSC = least significant changes at the [facility] (historical data)                          AP spine =  0.032 g/cm2  (6/26/2007)                          Left hip = 0.029 g/cm2  (6/15/2007)                          Right hip = 0.018 g/cm2  (6/15/2007)                          Left mid radius = 0.043 g/cm2  (6/26/2007)  Please note that the differential diagnosis of increase in bone density at the lumbar spine includes improvement due to pharmacotherapy vs inter-current progression of spine degeneration or fracture.     Ref. 4 Fracture risk is calculated in patients aged 40 to 90 years old with low bone density not on osteoporosis treatment. A 10 year fracture risk of 3% and higher for hip fracture and 20% and higher for major osteoporotic fracture is considered higher than acceptable risk and might be an indication for medical treatment.     Ref. 5.  By definition, osteoporosis may be diagnosed in the presence or with the history of a low trauma or fragility fracture.  Fragility and low trauma fracture is defined as a fracture resulting from the force of a fall from a standing height or less or a bone that breaks under conditions that would not cause a normal bone to break.       Ref. 6. NOF Physician's Guideline Website address:  www.nof.org.           Assessment/Plan:     50 YO female with history of back pain considering lumbar surgery who is referred for bone health assessment/ assessment of functional impairments prior to surgery.  DXA is normal however, HU of lumbar spine in 110-160 range depending upon the sampling region.   Will discuss bone health considerations with Dr. Wilhelm.  Will order PT for her now and will review vitamin D levels when  available.  Hypoalbuminemia noted on review of labs, will place a nutrition consult.  Follow-up in 6 weeks.      Yeimy Guerra,   Physical Medicine & Rehabilitation    I spent a total of 25 minutes with Zayra Ramirez during today's virtual video office visit. Over 50% of this time was spent counseling the patient and/or coordinating care. See note for details.     20 minutes spent on the date of the encounter doing chart review, history and exam, documentation and further activities as noted above                  Again, thank you for allowing me to participate in the care of your patient.      Sincerely,    Yeimy Guerra, DO

## 2021-12-08 NOTE — TELEPHONE ENCOUNTER
lvm to schedule:   RD soon in Dec Video RD in 1 month Dao MTM pharmacist in 1 month to follow up topiramate start phone Odette in 3 months prebariatric surgery video    Left call center phone number and sent [employer].

## 2021-12-09 ASSESSMENT — ANXIETY QUESTIONNAIRES: GAD7 TOTAL SCORE: 7

## 2021-12-09 ASSESSMENT — PATIENT HEALTH QUESTIONNAIRE - PHQ9: SUM OF ALL RESPONSES TO PHQ QUESTIONS 1-9: 7

## 2021-12-13 ENCOUNTER — TELEPHONE (OUTPATIENT)
Dept: ENDOCRINOLOGY | Facility: CLINIC | Age: 51
End: 2021-12-13

## 2021-12-13 ENCOUNTER — TELEPHONE (OUTPATIENT)
Dept: PHYSICAL MEDICINE AND REHAB | Facility: CLINIC | Age: 51
End: 2021-12-13

## 2021-12-13 NOTE — TELEPHONE ENCOUNTER
Called pt in regard to scheduled appointment at 9 am today.    Pt needing weight loss for a low back surgery and corrective neck surgery.   Pt recommended bariatric surgery but is not wanting bariatric surgery - hoping to lose weight without.    Pt and RD discussed MNT coverage and ABN due to pt's insurance coverage and having Medicare as one of her insurances. Pt plans to look into this more and get back to RD. Cancelled appointment today due to change in insurance/possible no coverage for MNT.     SAMY Childers, RD, LD  Clinic #: [phone]

## 2021-12-13 NOTE — TELEPHONE ENCOUNTER
Nutrition Education Scheduling Outreach #1:    Call to patient to schedule. Patient is going to check with insurance on visit coverage first.    Patito Razo  [facility]  Diabetes and Nutrition Scheduling

## 2021-12-14 NOTE — PATIENT INSTRUCTIONS
If you have not already watched our online seminar please go to [email or web address]    Weight loss requirement: 10 prior to surgery. Will have final weight check 2-3 weeks prior to surgery at anesthesia or nurse pre-op teaching visit.    -Need current weight confirmation at primary clinic or weight management clinic No    Bariatric labs ordered, call for a lab only appointment at any [facility] lab. To find a lab location near you, please call [phone]. Please let us know if orders need to be faxed to a non [facility] lab.    Schedule bariatric psych eval as soon as possible.  List of psychologists will be sent to you via [employer] or given to you in clinic.     Call Mono Brady at [phone] to discuss insurance coverage for bariatric surgery.  Please check with your insurance regarding bariatric surgery coverage also. Mono can also help you with scheduling psych eval if you are having difficulties.    The following clearance letters are needed: Letters will be sent to you via [employer] or given to you in clinic  - Letter of support from primary care provider. Provider can submit through electronic medical record or fax to [phone]  - sleep clearance  - psychiatrist letter of support  - GI evaluation for diarrhea completed  -ENT evaluation for hoarse voice completed    Smoking cessation and nicotine test needed: Yes    Consider topiramate ramp to 75mg.  Discuss with psychiatrist first.    Birth control after surgery discussed. Patient instructed that 2 forms of birth control required after surgery and to avoid pregnancy for at least 18 months after surgery:     NEXT VISITS: A  should reach out to you to schedule the following appointments.  If they do not reach you please call [phone] to schedule the following appointments:    -See dietitian in 1 month and monthly for 3 months    -See Odette in 3 months to follow up on pre-op weight loss and weight loss  medications    -See Dr Gaffney after psych eval and letter from psychiatrist.  Call [phone] to complete.    Bariatric Task List    Fax:  Please fax all paperwork to: [phone] -     Status:  Is patient a candidate for bariatric surgery?:    -     Cleared to schedule surgeon consult?:  not cleared to schedule surgeon consult -     Status:  surgery evaluation in process -     Surgeon: Dr Gaffney -     Tentative surgery month/year: TBD -        Insurance: Insurance:  [facility] -      Contact insurance to discuss coverage:   -       [employer]: PCP Recommendation and Medical Clearance:    -     HP Referral:    -      Advanced beneficiary notification (ABN) for Medicare patients for RD visits   and surgery:   -      Weight history:   -     Other:    -        Patient Info: Initial Weight:  223 -     Date of Initial Weight/Height:  12/6/2021 -     Goal Weight (lbs):  213 -     Required Weight Loss:  10 -     Surgery Type:  sleeve gastrectomy -     Multidisciplinary Meeting:    -        Dietician Visits: Structured weight loss required by insurance?:  structured weight loss required -     Dietician Visit 1:  Completed -     Dietician Visit 2:  Needed -     Dietician Visit 3:  Needed -     Dietician Visit 4:    -     Dietician Visit 5:    -     Dietician Visit 6:    -     Dietician Visit additional:  Needed - monthly until surgery   Clearance from dietician to see surgeon?:    -     Dietician Notes:    -        Psychological Evaluation: Psych eval:  Needed -     Therapist letter of support:    -     Psychiatrist letter of support:  Needed -     Establish care with therapist:    -     Complete eating disorder evaluation:    -     Letter of clearance from therapist/eating disorder program:    -     Other:    -        Lab Work: Complete Blood Count:  Needed -     Comprehensive Metabolic Panel:  Needed -     Vitamin D:  Needed -     PTH:  Needed -     Hgb A1c:  Needed -      Lipids: Needed -      TSH ([facility] [place]): Needed -    "    Ferritin:   -       Folate:   -       Testosterone, Total and Free:   -     Thiamine:   -     Vitamin A:   -     Vitamin B12:   -     Zinc:   -     C-peptide:   -     H. pylori:    -     MRSA (2 swabs, minimum 48 hours apart):   -     Nicotine Testing:  Needed -     Recheck Vitamin D:   -     Other:    -        Consults/ Clearance Sleep Medicine:  Needed -     Cardiac:    -     Pain:   -     Dental:    -     Endocrine:    -     Gastroenterology:  Needed - diarrhea, seeing GI Feb   Vascular Medicine:    -     Hematology:    -     Medical Weight Management:   -     Physical Therapy/Exercise:    -     Nephrology:    -     Neurology:    -     Pulmonology:    -     Rheumatology:    -     Other:  Needed - ENT hoarse voice   Other:    -     Other:    -        Testing: UGI:    -     EGD:  Completed - 2020, Dr Gaffney to review   Sleep Study:   -     Other:   -     Other:    -        PCP: Establish care with PCP:  Completed -     Follow up with PCP:    -     PCP letter of support:  Needed -        Stopping Smoking/ Alcohol Use: Quit tobacco use (3 months smoke free)?:  Completed -     Quit date:  11/13/2021 -     Quit alcohol use:   -     Quit date:   -     Other:   -     Quit date:   -        Patient Education:  Information Session:  Completed -     Given \"Making your decision\" handout?:  Yes -     Given \"A Roadmap to you Weight Loss Surgery\" handout?: Yes -     Given \"Get Well Loop\" information?: Yes -     Given support group information?:    -     Attended support group?:    -     Support plan in place?:  Completed -     Research consents signed?:  research consent not signed -     Avoid NSAIDS/ Alternate Plan for Pain:   -        Additional Surgery Requirements: Review Coag plan:    -     HgA1c <8:    -     Inpatient pain consult:    -     Final nicotine screen:    -     Dental work complete:    -     Birth control plan:    -     Gallstone prevention plan (Actig[employer] for 6 months postop):   -     Other:   -     Other:   " -        Final Tasks:  Before surgery online class:  Needed -     Before surgery online class website link:  [email or web address]   After surgery online class:  Needed -     After surgery online class website link:  [email or web address]   Nurse visit per clinic:  Needed -     History and Physical per clinic:   -     Final labs per clinic: Needed -     Chest xray per clinic:   -     Electrocardiogram (ECG) per clinic:   -     Other:   -        Notes:   -

## 2021-12-14 NOTE — ASSESSMENT & PLAN NOTE
Consider sleeve gastrectomy  See Dr Gaffney for bariatric surgery consult after psych eval complete and letter from psychiatrist  Preop tasklist and requirement list given to patient.

## 2021-12-16 NOTE — PROGRESS NOTES
"[facility]  Rheumatology Clinic  Panchito Saenz MD  2021     Name: Zayra Ramirez  MRN: [id number]  Age: 51 year old  : 1970  Referring provider: Aime Mason    Assessment and Plan:  # Seronegative inflammatory arthritis:  Inflammatory arthritis is likely flaring, with increased daily joint pain in the small joints of the hands and feet and prolonged morning stiffness.  Video exam shows left shoulder reduced adduction/IR to 120 degrees in both ranges.  No visible inflammatory joint changes in the hands or wrist.  Laboratory evaluation in 2021 showed creatinine, CBC, and transaminases all normal; CRP was modestly elevated at 24.     Seronegative inflammatory arthritis is worse despite continuous use of moderate dose methotrexate.  We discussed the possibility of a transient flare causing the bulk of current symptoms.  I recommend a 3-week course of tapering prednisone to address.  If symptoms resurge after completion of prednisone, I will recommend increasing dose of methotrexate to full dose of 25 mg weekly.  Combination therapy with anti-TNF may also be warranted.     # Impingement, left shoulder:  Left shoulder range of motion is restricted, overall improved after Ptx.  Continue performing daily physical therapy exercises and  \"cane raising\" exercises to maintain flexion range of motion on the left.    # Tobacco abuse:   I counseled the patient to quit smoking; she is cutting back, still smoking 15 cigs a day.    Follow-up: Return in about 4-5 months    Plan:  1.  Continue methotrexate 9 tablets (22.5 mg) once weekly.While on methotrexate:   -- Check labs every 3 months (AST/ALT, Albumin, CBC with platelets)   -- Limit alcohol intake to 2 drinks weekly; use folate 1 mg daily.  --Tylenol 500-1000 mg can be used as needed up to three times daily for nausea/headache associated with dosing.  2.  Continue daily \"cane raising\" and while walking exercises to maintain and expand left shoulder " "range of motion.  3.  Repeat monitoring blood work in February 2022  4.  To address inflammatory arthritis flare, start prednisone 20 mg daily for 7 days, then 15 mg daily for 7 days, then 10 mg daily for 7 days.  If joint pain symptoms improved during prednisone, but flare again after prednisone is done, let rheumatology know; change in methotrexate dose may be warranted.    Panchito Saenz MD  Staff Rheumatologist, [facility]:   Zayra Ramirez follows up for seronegative inflammatory arthritis and lower back pain.  She was last seen in 3-2021, when seronegative inflammatory arthritis was judged quiescent.  Recommendation was to continue methotrexate monotherapy at 22.5 mg once weekly.    Interval history 12-    Back and neck pain is \"a mess\". She has beeen consulting with a spine surgeon and has been offered a surgery to address a fractured plate and screw in the neck.     She is awakening now every morning with severe stiffness and pain, \"everywhere\" in legs, ankles, hips. Her fingers are painful in the morning, but not as intense as before she started methotrexate. She remains in bed 1-3 hours every day. Her hands appear more swollen in the mornings  Stretching provides minimal relief. She has tried ibuprofen 800 mg; it gave partial relief; tylenol does not help. No prednisone.     She takes methotrexate 9 tabs on Mondays. No AE. No dosing cycle symptoms.    Interval history 03-:    She has hoarse voice for 1 week. No ST. No cough or SOB.    She has had a flare of joint pain near the L shoulder and radiates into upper outer arm. ROM is stable and gradually improving with exercise and chiropracty.    Former small joint predominant pain in the hands and feet is no longer present.  There is negligible morning stiffness, and minimal disruption of activities of daily living due to joint pain other than the shoulder.  She continues on 9 methotrexate tabs weekly.     May 27, 2020 Keyon Branham " APRN, CNP, MSN    Rheumatoid arthritis (RA) improved, and doing well. Doing accupuncture for bilateral shoulder pain, chronic is now fully resolved. She is doing daily HEP from prior Physical Therapy and noticing improved ROM. No residual issues from epidural spinal abscess last year. Denies any fever, chills, SOB, BLANK, night sweats, or chest pain, high fever, cough, travel in last 14 days or exposure to covid-19 (coronavirus). Reports healthy. Staying home and following CDC guidelines. Knees mild red and pain with swelling, with weather changes but resolved on own with ice <1 day. Feet, hands, wrists are good. No EAS      Methotrexate  22.9 mg every 7 days MON, folic acid  1 mg day, naproxsyn 500 mg BID prn not taking and not effective, tolerating no side-effects. On prilosec.     Today, the patient reports some discomfort in the left shoulder, but is otherwise well. She states she had a short course of prednisone for a couple weeks, but has been off of this now for a matter of weeks. She explains that her short course of prednisone was helpful. She noted the return of left shoulder pain after stopping prednisone as well. She reports that she has also had 4 doses of methotrexate (9 tablets) since our last visit. She also takes folic acid daily with the exception of the days she takes methotrexate.     She reports that she has morning joint stiffness for around one hour and is unsure if this is improved. She states that she takes naproxen for back pain. She denies significant ankle pain or other new symptoms or concerns. She states that she performs physical therapy exercises daily for her shoulder including stretching and wall-walking.     Patient was seen by Dr. Elizabeth in Infectious Disease on September 25th in follow-up of spinal epidural abscess.  Normalization of inflammatory markers and markedly improved symptoms were recorded. Completion of a 6-week course of cefazolin for a spinal abscess was recommended.      She reports that her right fingers/hand have seen to move or twitch as noticed by her family.     Patient saw Dr. Florian in [facility] in follow-up of lumbar spondylosis on November 4th. Chronic lower right back pain was reported. Improvement in pain after prior RF ablation at bilateral L2-L5 was noted. Plans to repeat the radiofrequency ablation procedure were arranged.     Review of Systems:   Pertinent items are noted in HPI or as below, remainder of complete ROS is negative.      No recent problems with hearing or vision. No swallowing problems.   No breathing difficulty, shortness of breath, coughing, or wheezing.  No chest pain or palpitations.  No heart burn, indigestion, abdominal pain, nausea, vomiting, diarrhea.  No urination problems, no bloody, cloudy urine, no dysuria.  No numbing, tingling, weakness.  No headaches or confusion.  No rashes. No easy bleeding or bruising.     Active Medications:   Current Outpatient Medications   Medication Sig     albuterol (PROAIR HFA, PROVENTIL HFA, VENTOLIN HFA) 108 (90 BASE) MCG/ACT inhaler Inhale 2 puffs into the lungs every 6 hours as needed for shortness of breath / dyspnea or wheezing (PT last dose 1.23.2020)      busPIRone (BUSPAR) 15 MG tablet Take 15 mg by mouth every morning Also takes 30 mg at bedtime - see other order.     busPIRone HCl (BUSPAR) 30 MG tablet Take 30 mg by mouth At Bedtime Also takes 15 mg in the morning - see other order.     carboxymethylcellulose (CARBOXYMETHYLCELLULOSE SODIUM) 0.5 % SOLN ophthalmic solution Place 1 drop into both eyes 3 times daily as needed for dry eyes     celecoxib (CELEBREX) 200 MG capsule Take 1 capsule (200 mg) by mouth every morning     cetirizine (ZYRTEC) 10 MG tablet Take 1 tablet (10 mg) by mouth daily     cholecalciferol ( ULTRA STRENGTH) 2000 units CAPS Take 2,000 Units by mouth every evening      cholestyramine (QUESTRAN) 4 GM/DOSE powder Take 4 grams once per day at least 2 hours after and at  lease 2 hours before eating or taking medications.     cyclobenzaprine (FLEXERIL) 10 MG tablet Take 1 tablet (10 mg) by mouth daily (Patient taking differently: Take 10 mg by mouth At Bedtime )     DULoxetine (CYMBALTA) 60 MG capsule Take 120 mg by mouth At Bedtime      EPINEPHrine (EPIPEN/ADRENACLICK/OR ANY BX GENERIC EQUIV) 0.3 MG/0.3ML injection 2-pack INJECT 0.3ML INTO THE MUSCLE ONCE AS NEEDED FOR ANAPHYLAXIS     ferrous sulfate (FEROSUL) 325 (65 Fe) MG tablet Take 325 mg by mouth daily (with breakfast)      folic acid (FOLVITE) 1 MG tablet Take 1 tablet (1 mg) by mouth daily     ibuprofen (ADVIL/MOTRIN) 200 MG tablet Take 400 mg by mouth every 8 hours as needed for mild pain     lisinopril-hydrochlorothiazide (ZESTORETIC) 20-25 MG tablet Take 1 tablet by mouth daily (Patient taking differently: Take 1 tablet by mouth every evening )     methotrexate sodium 2.5 MG TABS TAKE 9 TABLETS BY MOUTH ONCE WEEKLY  Labs  past due. appt past due. (Patient taking differently: TAKE 9 TABLETS BY MOUTH ONCE WEEKLY  Labs  past due. appt past due.  Takes on Mondays.)     montelukast (SINGULAIR) 10 MG tablet Take 10 mg by mouth At Bedtime     nystatin (MYCOSTATIN) 484267 UNIT/GM external cream Apply topically 2 times daily (Patient taking differently: Apply topically daily as needed )     omeprazole (PRILOSEC) 40 MG DR capsule Take 1 capsule (40 mg) by mouth daily (Patient taking differently: Take 40 mg by mouth every evening )     OXYGEN-HELIUM IN 2-3L PRN during the day, 3L @ night    Oxygen only not helium     pregabalin (LYRICA) 150 MG capsule Take 1 capsule (150 mg) by mouth 2 times daily     Respiratory Therapy Supplies ([facility] CPAP FILTER) MISC      rOPINIRole (REQUIP) 0.5 MG tablet Take 1 tablet (0.5 mg) by mouth At Bedtime Take 1 tab by mouth at bedtime.     triamcinolone (KENALOG) 0.1 % external ointment Apply topically 2 times daily     vitamin C 500 MG TABS Take 500 mg by mouth daily (with lunch)       zinc sulfate (ZINCATE) 220 (50 Zn) MG capsule Take 220 mg by mouth daily (with lunch)      Current Facility-Administered Medications   Medication     ropivacaine (NAROPIN) injection 1 mL     triamcinolone (KENALOG-40) injection 40 mg     Facility-Administered Medications Ordered in Other Visits   Medication     Lidocaine 1 % injection 0.5-5 mL     sodium chloride (PF) 0.9% PF flush 5-50 mL           Allergies:   Bee Venom       Bees      Doxycycline       Erythromycin     Hydrocodone-Acetaminophen                 Past Medical History:  Remarkable for moderate, persistent asthma, hypertension, bipolar II disorder, interstitial lung disease, cervical stenosis with myelopathy 2017.       Chronic midline low back pain  Facial cellulitis  Morbid (severe) obesity due to excess calories  Dental abscess  Hypoxia  Subcutaneous emphysema  Borderline personality disorder  Stenosis of cervical spine with myelopathy  Esophageal stricture  Gastroesophageal reflux disease   Hypertension   Interstitial lung disease  Moderate persistent asthma without complication  Onychomycosis  Restless leg syndrome  Seasonal allergies  Smoker  Stress  Respiratory bronchiolitis interstitial lung disease     Past Surgical History:  Remarkable for surgical fusion  Hysterectomy 1997  rectocele and Cystocele surgery  Cholecystectomy     Family History:    The patient's family history includes Asthma in her mother; Back Pain in her father; Hypertension in her father; Other Cancer in her father.    Social History:  The patient reports that she has been smoking cigarettes, around 0.5 packs per day. She started smoking about 23 years ago. She has a 30.00 pack-year smoking history. She has never used smokeless tobacco. She reports that she does not drink alcohol or use drugs.   PCP: Adam Del Toro  Marital Status: Single     Physical Exam:   LMP  (LMP Unknown)    Wt Readings from Last 4 Encounters:   12/06/21 101.2 kg (223 lb)   11/29/21 100.2 kg  (221 lb)   11/24/21 100.1 kg (220 lb 11.2 oz)   11/11/21 101.2 kg (223 lb)     Constitutional: Well-developed, appearing stated age; cooperative.  Eyes: Normal EOM, PERRLA, vision, conjunctiva, sclera.  ENT: Normal external ears, nose, hearing, lips, teeth, gums, throat. No mucous membrane lesions.  Neck: No mass or thyroid enlargement.  Resp: Breathing unlabored  MS: Left shoulder shows painful abduction of 110 degrees, and flexion of 120 degrees.  External rotation nl. Fist formation intact, wrist ROM.  No visible swelling at MCPs or PIPs.  Skin: No nail pitting, alopecia, rash, nodules or lesions.  Neuro: Normal cranial nerves  Psych: Normal judgement, orientation, memory, affect.    Laboratory:   RHEUM RESULTS Latest Ref Rng & Units 3/5/2015 3/6/2015 3/7/2015   ALBUMIN 3.4 - 5.0 g/dL - - -   ALT 0 - 50 U/L - - -   AST 0 - 45 U/L - - -   CREATININE 0.52 - 1.04 mg/dL 0.66 0.78 -   CRP 0.0 - 8.0 mg/L - - -   GFR ESTIMATE, IF BLACK >60 mL/min/[1.73:m2] >90  African American GFR Calc   >90   GFR Calc   -   GFR ESTIMATE >60 mL/min/1.73m2 >90  Non  GFR Calc   81 -   HEMATOCRIT 35.0 - 47.0 % 40.4 40.5 38.4   HEMOGLOBIN 11.7 - 15.7 g/dL 13.5 13.4 12.3   WBC 4.0 - 11.0 10e3/uL 16.3(H) 12.8(H) 8.7   RBC 3.80 - 5.20 10e6/uL 4.46 4.44 4.19   RDW 10.0 - 15.0 % 14.6 14.6 14.4   MCHC 31.5 - 36.5 g/dL 33.4 33.1 32.0   MCV 78 - 100 fL 91 91 92   PLATELET COUNT 150 - 450 10e3/uL 315 298 285   ESR 0 - 20 mm/h - - -     Zayra is a 51 year old who is being evaluated via a billable video visit.      How would you like to obtain your AVS? MyChart  If the video visit is dropped, the invitation should be resent by: Send to e-mail at: [email or web address]  Will anyone else be joining your video visit? No      Video Start Time: 11:34 AM  Video-Visit Details    Type of service:  Video Visit    Video End Time:12:05 PM    Originating Location (pt. Location): Home    Distant Location (provider location):    [facility] RHEUMATOLOGY [facility] [place]     Platform used for Video Visit: Rosey

## 2021-12-17 ENCOUNTER — VIRTUAL VISIT (OUTPATIENT)
Dept: RHEUMATOLOGY | Facility: CLINIC | Age: 51
End: 2021-12-17
Attending: INTERNAL MEDICINE
Payer: COMMERCIAL

## 2021-12-17 DIAGNOSIS — G89.29 CHRONIC MIDLINE LOW BACK PAIN WITH RIGHT-SIDED SCIATICA: ICD-10-CM

## 2021-12-17 DIAGNOSIS — Z87.39 HISTORY OF SERONEGATIVE INFLAMMATORY ARTHRITIS: ICD-10-CM

## 2021-12-17 DIAGNOSIS — M54.41 CHRONIC MIDLINE LOW BACK PAIN WITH RIGHT-SIDED SCIATICA: ICD-10-CM

## 2021-12-17 DIAGNOSIS — M54.2 CHRONIC NECK PAIN: ICD-10-CM

## 2021-12-17 DIAGNOSIS — G89.29 CHRONIC NECK PAIN: ICD-10-CM

## 2021-12-17 PROCEDURE — 99214 OFFICE O/P EST MOD 30 MIN: CPT | Mod: 95 | Performed by: INTERNAL MEDICINE

## 2021-12-17 RX ORDER — FOLIC ACID 1 MG/1
1 TABLET ORAL DAILY
Qty: 90 TABLET | Refills: 3 | Status: SHIPPED | OUTPATIENT
Start: 2021-12-17 | End: 2022-06-02

## 2021-12-17 RX ORDER — CELECOXIB 200 MG/1
200 CAPSULE ORAL EVERY MORNING
Qty: 60 CAPSULE | Refills: 4 | Status: SHIPPED | OUTPATIENT
Start: 2021-12-17 | End: 2022-07-29

## 2021-12-17 RX ORDER — METHOTREXATE 2.5 MG/1
22.5 TABLET ORAL WEEKLY
Qty: 108 TABLET | Refills: 2 | Status: SHIPPED | OUTPATIENT
Start: 2021-12-17 | End: 2022-01-20

## 2021-12-17 ASSESSMENT — PAIN SCALES - GENERAL: PAINLEVEL: NO PAIN (0)

## 2021-12-17 NOTE — PATIENT INSTRUCTIONS
"Diagnosis:  1.  Inflammatory arthritis: Over the past 3 to 4 weeks, pain and swelling has recurred especially in the small joints of the hands and feet.  Arthritis is likely flaring, although some component of the pain may reflect neck and low back spinal issues. I recommend continuing on methotrexate at current dose, but using a several week course of tapering prednisone to address arthritis flare.  2.  Left shoulder pain and reduced motion: Left shoulder pain persists, but defects in range of motion and control of left shoulder discomfort have been addressed with physical therapy and chiropractic.  3.  Neck and low back pain: I agree with ongoing evaluation and management through the orthopedic spine service for fractured hardware and for mechanical issues with the lower spine.  I do not expect medication to significantly affect the course of back and neck pain.    Plan:  1.  Continue methotrexate 9 tablets (22.5 mg) once weekly.While on methotrexate:   -- Check labs every 3 months (AST/ALT, Albumin, CBC with platelets)   -- Limit alcohol intake to 2 drinks weekly; use folate 1 mg daily.  --Tylenol 500-1000 mg can be used as needed up to three times daily for nausea/headache associated with dosing.  2.  Continue daily \"cane raising\" and while walking exercises to maintain and expand left shoulder range of motion.  3.  Repeat monitoring blood work in February 2022  4.  To address inflammatory arthritis flare, start prednisone 20 mg daily for 7 days, then 15 mg daily for 7 days, then 10 mg daily for 7 days.  If joint pain symptoms improved during prednisone, but flare again after prednisone is done, let rheumatology know; change in methotrexate dose may be warranted.  "

## 2021-12-17 NOTE — LETTER
"2021       RE: Zarya Ramirez  [address] [place] [address]     Dear Colleague,    Thank you for referring your patient, Zayra Ramirez, to the [facility] RHEUMATOLOGY CLINIC [place] at [facility]. Please see a copy of my visit note below.    [facility]  Rheumatology Clinic  Panchito Saenz MD  2021     Name: Zayra Ramirez  MRN: [id number]  Age: 51 year old  : 1970  Referring provider: Aime Mason    Assessment and Plan:  # Seronegative inflammatory arthritis:  Inflammatory arthritis is likely flaring, with increased daily joint pain in the small joints of the hands and feet and prolonged morning stiffness.  Video exam shows left shoulder reduced adduction/IR to 120 degrees in both ranges.  No visible inflammatory joint changes in the hands or wrist.  Laboratory evaluation in 2021 showed creatinine, CBC, and transaminases all normal; CRP was modestly elevated at 24.     Seronegative inflammatory arthritis is worse despite continuous use of moderate dose methotrexate.  We discussed the possibility of a transient flare causing the bulk of current symptoms.  I recommend a 3-week course of tapering prednisone to address.  If symptoms resurge after completion of prednisone, I will recommend increasing dose of methotrexate to full dose of 25 mg weekly.  Combination therapy with anti-TNF may also be warranted.     # Impingement, left shoulder:  Left shoulder range of motion is restricted, overall improved after Ptx.  Continue performing daily physical therapy exercises and  \"cane raising\" exercises to maintain flexion range of motion on the left.    # Tobacco abuse:   I counseled the patient to quit smoking; she is cutting back, still smoking 15 cigs a day.    Follow-up: Return in about 4-5 months    Plan:  1.  Continue methotrexate 9 tablets (22.5 mg) once weekly.While on methotrexate:   -- Check labs every 3 months " "(AST/ALT, Albumin, CBC with platelets)   -- Limit alcohol intake to 2 drinks weekly; use folate 1 mg daily.  --Tylenol 500-1000 mg can be used as needed up to three times daily for nausea/headache associated with dosing.  2.  Continue daily \"cane raising\" and while walking exercises to maintain and expand left shoulder range of motion.  3.  Repeat monitoring blood work in February 2022  4.  To address inflammatory arthritis flare, start prednisone 20 mg daily for 7 days, then 15 mg daily for 7 days, then 10 mg daily for 7 days.  If joint pain symptoms improved during prednisone, but flare again after prednisone is done, let rheumatology know; change in methotrexate dose may be warranted.    Panchito Saenz MD  Staff Rheumatologist, [facility]:   Zayra Ramirez follows up for seronegative inflammatory arthritis and lower back pain.  She was last seen in 3-2021, when seronegative inflammatory arthritis was judged quiescent.  Recommendation was to continue methotrexate monotherapy at 22.5 mg once weekly.    Interval history 12-    Back and neck pain is \"a mess\". She has beeen consulting with a spine surgeon and has been offered a surgery to address a fractured plate and screw in the neck.     She is awakening now every morning with severe stiffness and pain, \"everywhere\" in legs, ankles, hips. Her fingers are painful in the morning, but not as intense as before she started methotrexate. She remains in bed 1-3 hours every day. Her hands appear more swollen in the mornings  Stretching provides minimal relief. She has tried ibuprofen 800 mg; it gave partial relief; tylenol does not help. No prednisone.     She takes methotrexate 9 tabs on Mondays. No AE. No dosing cycle symptoms.    Interval history 03-:    She has hoarse voice for 1 week. No ST. No cough or SOB.    She has had a flare of joint pain near the L shoulder and radiates into upper outer arm. ROM is stable and gradually improving with " exercise and chiropracty.    Former small joint predominant pain in the hands and feet is no longer present.  There is negligible morning stiffness, and minimal disruption of activities of daily living due to joint pain other than the shoulder.  She continues on 9 methotrexate tabs weekly.     May 27, 2020 Keyon MAHARAJ, CNP, MSN    Rheumatoid arthritis (RA) improved, and doing well. Doing accupuncture for bilateral shoulder pain, chronic is now fully resolved. She is doing daily HEP from prior Physical Therapy and noticing improved ROM. No residual issues from epidural spinal abscess last year. Denies any fever, chills, SOB, BLANK, night sweats, or chest pain, high fever, cough, travel in last 14 days or exposure to covid-19 (coronavirus). Reports healthy. Staying home and following CDC guidelines. Knees mild red and pain with swelling, with weather changes but resolved on own with ice <1 day. Feet, hands, wrists are good. No EAS      Methotrexate  22.9 mg every 7 days MON, folic acid  1 mg day, naproxsyn 500 mg BID prn not taking and not effective, tolerating no side-effects. On prilosec.     Today, the patient reports some discomfort in the left shoulder, but is otherwise well. She states she had a short course of prednisone for a couple weeks, but has been off of this now for a matter of weeks. She explains that her short course of prednisone was helpful. She noted the return of left shoulder pain after stopping prednisone as well. She reports that she has also had 4 doses of methotrexate (9 tablets) since our last visit. She also takes folic acid daily with the exception of the days she takes methotrexate.     She reports that she has morning joint stiffness for around one hour and is unsure if this is improved. She states that she takes naproxen for back pain. She denies significant ankle pain or other new symptoms or concerns. She states that she performs physical therapy exercises daily for her shoulder  including stretching and wall-walking.     Patient was seen by Dr. Elizabeth in Infectious Disease on September 25th in follow-up of spinal epidural abscess.  Normalization of inflammatory markers and markedly improved symptoms were recorded. Completion of a 6-week course of cefazolin for a spinal abscess was recommended.     She reports that her right fingers/hand have seen to move or twitch as noticed by her family.     Patient saw Dr. Florian in [facility] in follow-up of lumbar spondylosis on November 4th. Chronic lower right back pain was reported. Improvement in pain after prior RF ablation at bilateral L2-L5 was noted. Plans to repeat the radiofrequency ablation procedure were arranged.     Review of Systems:   Pertinent items are noted in HPI or as below, remainder of complete ROS is negative.      No recent problems with hearing or vision. No swallowing problems.   No breathing difficulty, shortness of breath, coughing, or wheezing.  No chest pain or palpitations.  No heart burn, indigestion, abdominal pain, nausea, vomiting, diarrhea.  No urination problems, no bloody, cloudy urine, no dysuria.  No numbing, tingling, weakness.  No headaches or confusion.  No rashes. No easy bleeding or bruising.     Active Medications:   Current Outpatient Medications   Medication Sig     albuterol (PROAIR HFA, PROVENTIL HFA, VENTOLIN HFA) 108 (90 BASE) MCG/ACT inhaler Inhale 2 puffs into the lungs every 6 hours as needed for shortness of breath / dyspnea or wheezing (PT last dose 1.23.2020)      busPIRone (BUSPAR) 15 MG tablet Take 15 mg by mouth every morning Also takes 30 mg at bedtime - see other order.     busPIRone HCl (BUSPAR) 30 MG tablet Take 30 mg by mouth At Bedtime Also takes 15 mg in the morning - see other order.     carboxymethylcellulose (CARBOXYMETHYLCELLULOSE SODIUM) 0.5 % SOLN ophthalmic solution Place 1 drop into both eyes 3 times daily as needed for dry eyes     celecoxib (CELEBREX) 200 MG capsule Take 1  capsule (200 mg) by mouth every morning     cetirizine (ZYRTEC) 10 MG tablet Take 1 tablet (10 mg) by mouth daily     cholecalciferol ( ULTRA STRENGTH) 2000 units CAPS Take 2,000 Units by mouth every evening      cholestyramine (QUESTRAN) 4 GM/DOSE powder Take 4 grams once per day at least 2 hours after and at lease 2 hours before eating or taking medications.     cyclobenzaprine (FLEXERIL) 10 MG tablet Take 1 tablet (10 mg) by mouth daily (Patient taking differently: Take 10 mg by mouth At Bedtime )     DULoxetine (CYMBALTA) 60 MG capsule Take 120 mg by mouth At Bedtime      EPINEPHrine (EPIPEN/ADRENACLICK/OR ANY BX GENERIC EQUIV) 0.3 MG/0.3ML injection 2-pack INJECT 0.3ML INTO THE MUSCLE ONCE AS NEEDED FOR ANAPHYLAXIS     ferrous sulfate (FEROSUL) 325 (65 Fe) MG tablet Take 325 mg by mouth daily (with breakfast)      folic acid (FOLVITE) 1 MG tablet Take 1 tablet (1 mg) by mouth daily     ibuprofen (ADVIL/MOTRIN) 200 MG tablet Take 400 mg by mouth every 8 hours as needed for mild pain     lisinopril-hydrochlorothiazide (ZESTORETIC) 20-25 MG tablet Take 1 tablet by mouth daily (Patient taking differently: Take 1 tablet by mouth every evening )     methotrexate sodium 2.5 MG TABS TAKE 9 TABLETS BY MOUTH ONCE WEEKLY  Labs  past due. appt past due. (Patient taking differently: TAKE 9 TABLETS BY MOUTH ONCE WEEKLY  Labs  past due. appt past due.  Takes on Mondays.)     montelukast (SINGULAIR) 10 MG tablet Take 10 mg by mouth At Bedtime     nystatin (MYCOSTATIN) 466068 UNIT/GM external cream Apply topically 2 times daily (Patient taking differently: Apply topically daily as needed )     omeprazole (PRILOSEC) 40 MG DR capsule Take 1 capsule (40 mg) by mouth daily (Patient taking differently: Take 40 mg by mouth every evening )     OXYGEN-HELIUM IN 2-3L PRN during the day, 3L @ night    Oxygen only not helium     pregabalin (LYRICA) 150 MG capsule Take 1 capsule (150 mg) by mouth 2 times daily     Respiratory Therapy  Supplies (CARETOUCH UNIVERSL CPAP FILTER) MISC      rOPINIRole (REQUIP) 0.5 MG tablet Take 1 tablet (0.5 mg) by mouth At Bedtime Take 1 tab by mouth at bedtime.     triamcinolone (KENALOG) 0.1 % external ointment Apply topically 2 times daily     vitamin C 500 MG TABS Take 500 mg by mouth daily (with lunch)      zinc sulfate (ZINCATE) 220 (50 Zn) MG capsule Take 220 mg by mouth daily (with lunch)      Current Facility-Administered Medications   Medication     ropivacaine (NAROPIN) injection 1 mL     triamcinolone (KENALOG-40) injection 40 mg     Facility-Administered Medications Ordered in Other Visits   Medication     Lidocaine 1 % injection 0.5-5 mL     sodium chloride (PF) 0.9% PF flush 5-50 mL           Allergies:   Bee Venom       Bees      Doxycycline       Erythromycin     Hydrocodone-Acetaminophen                 Past Medical History:  Remarkable for moderate, persistent asthma, hypertension, bipolar II disorder, interstitial lung disease, cervical stenosis with myelopathy 2017.       Chronic midline low back pain  Facial cellulitis  Morbid (severe) obesity due to excess calories  Dental abscess  Hypoxia  Subcutaneous emphysema  Borderline personality disorder  Stenosis of cervical spine with myelopathy  Esophageal stricture  Gastroesophageal reflux disease   Hypertension   Interstitial lung disease  Moderate persistent asthma without complication  Onychomycosis  Restless leg syndrome  Seasonal allergies  Smoker  Stress  Respiratory bronchiolitis interstitial lung disease     Past Surgical History:  Remarkable for surgical fusion  Hysterectomy 1997  rectocele and Cystocele surgery  Cholecystectomy     Family History:    The patient's family history includes Asthma in her mother; Back Pain in her father; Hypertension in her father; Other Cancer in her father.    Social History:  The patient reports that she has been smoking cigarettes, around 0.5 packs per day. She started smoking about 23 years ago. She has  a 30.00 pack-year smoking history. She has never used smokeless tobacco. She reports that she does not drink alcohol or use drugs.   PCP: Adam Del Toro  Marital Status: Single     Physical Exam:   LMP  (LMP Unknown)    Wt Readings from Last 4 Encounters:   12/06/21 101.2 kg (223 lb)   11/29/21 100.2 kg (221 lb)   11/24/21 100.1 kg (220 lb 11.2 oz)   11/11/21 101.2 kg (223 lb)     Constitutional: Well-developed, appearing stated age; cooperative.  Eyes: Normal EOM, PERRLA, vision, conjunctiva, sclera.  ENT: Normal external ears, nose, hearing, lips, teeth, gums, throat. No mucous membrane lesions.  Neck: No mass or thyroid enlargement.  Resp: Breathing unlabored  MS: Left shoulder shows painful abduction of 110 degrees, and flexion of 120 degrees.  External rotation nl. Fist formation intact, wrist ROM.  No visible swelling at MCPs or PIPs.  Skin: No nail pitting, alopecia, rash, nodules or lesions.  Neuro: Normal cranial nerves  Psych: Normal judgement, orientation, memory, affect.    Laboratory:   RHEUM RESULTS Latest Ref Rng & Units 3/5/2015 3/6/2015 3/7/2015   ALBUMIN 3.4 - 5.0 g/dL - - -   ALT 0 - 50 U/L - - -   AST 0 - 45 U/L - - -   CREATININE 0.52 - 1.04 mg/dL 0.66 0.78 -   CRP 0.0 - 8.0 mg/L - - -   GFR ESTIMATE, IF BLACK >60 mL/min/[1.73:m2] >90  African American GFR Calc   >90   GFR Calc   -   GFR ESTIMATE >60 mL/min/1.73m2 >90  Non  GFR Calc   81 -   HEMATOCRIT 35.0 - 47.0 % 40.4 40.5 38.4   HEMOGLOBIN 11.7 - 15.7 g/dL 13.5 13.4 12.3   WBC 4.0 - 11.0 10e3/uL 16.3(H) 12.8(H) 8.7   RBC 3.80 - 5.20 10e6/uL 4.46 4.44 4.19   RDW 10.0 - 15.0 % 14.6 14.6 14.4   MCHC 31.5 - 36.5 g/dL 33.4 33.1 32.0   MCV 78 - 100 fL 91 91 92   PLATELET COUNT 150 - 450 10e3/uL 315 298 285   ESR 0 - 20 mm/h - - -     Zayra is a 51 year old who is being evaluated via a billable video visit.      How would you like to obtain your AVS? MyChart  If the video visit is dropped, the invitation  should be resent by: Send to e-mail at: [email or web address]  Will anyone else be joining your video visit? No      Video Start Time: 11:34 AM  Video-Visit Details    Type of service:  Video Visit    Video End Time:12:05 PM    Originating Location (pt. Location): Home    Distant Location (provider location):  [facility] RHEUMATOLOGY CLINIC [place]     Platform used for Video Visit: [employer]        Again, thank you for allowing me to participate in the care of your patient.      Sincerely,    Panchito Saenz MD

## 2021-12-19 DIAGNOSIS — G06.1 SPINAL EPIDURAL ABSCESS: ICD-10-CM

## 2021-12-20 ENCOUNTER — TELEPHONE (OUTPATIENT)
Dept: RHEUMATOLOGY | Facility: CLINIC | Age: 51
End: 2021-12-20
Payer: COMMERCIAL

## 2021-12-20 DIAGNOSIS — Z87.39 HISTORY OF SERONEGATIVE INFLAMMATORY ARTHRITIS: Primary | ICD-10-CM

## 2021-12-20 RX ORDER — PREDNISONE 5 MG/1
TABLET ORAL
Qty: 63 TABLET | Refills: 0 | Status: SHIPPED | OUTPATIENT
Start: 2021-12-20 | End: 2022-01-17

## 2021-12-20 NOTE — TELEPHONE ENCOUNTER
Patient called clinic to report that an order for prednisone was not sent to her pharmacy as discussed during her video visit on 12/17/21.   Prednisone orders pended and sent to Dr. Saenz for review and signature.   Yandy Erickson RN  Adult Rheumatology Clinic

## 2021-12-20 NOTE — TELEPHONE ENCOUNTER
Thank you for alerting me to the unfulfilled promise that I made during visit. I do not know why the order for medication did not go out--I always attempt to place such orders immediately after the encounter. Apologies!

## 2021-12-22 NOTE — TELEPHONE ENCOUNTER
CYCLOBENZAPRINE 10 MG TABLET  Last Written Prescription Date:  7/15/2021  Last Fill Quantity: 90,   # refills: 1  Last Office Visit : 11/2/2021  Future Office visit:  None    Routing refill request to provider for review/approval because:  Drug not on the G, P or [facility] refill protocol or controlled substance      Lucie Ambriz RN  Central Triage Red Flags/Med Refills

## 2021-12-27 RX ORDER — CYCLOBENZAPRINE HCL 10 MG
10 TABLET ORAL AT BEDTIME
Qty: 90 TABLET | Refills: 1 | Status: SHIPPED | OUTPATIENT
Start: 2021-12-27 | End: 2022-06-12

## 2022-01-04 ENCOUNTER — HOSPITAL ENCOUNTER (OUTPATIENT)
Dept: PHYSICAL THERAPY | Facility: CLINIC | Age: 52
Setting detail: THERAPIES SERIES
End: 2022-01-04
Attending: PHYSICAL MEDICINE & REHABILITATION
Payer: COMMERCIAL

## 2022-01-04 DIAGNOSIS — R26.89 BALANCE PROBLEMS: ICD-10-CM

## 2022-01-04 PROCEDURE — 97162 PT EVAL MOD COMPLEX 30 MIN: CPT | Mod: GP | Performed by: PHYSICAL THERAPIST

## 2022-01-04 PROCEDURE — 97110 THERAPEUTIC EXERCISES: CPT | Mod: GP | Performed by: PHYSICAL THERAPIST

## 2022-01-04 PROCEDURE — 97116 GAIT TRAINING THERAPY: CPT | Mod: GP | Performed by: PHYSICAL THERAPIST

## 2022-01-04 NOTE — PROGRESS NOTES
01/04/22 0900   Signing Clinician's Name / Credentials   Signing clinician's name / credentials Rosy Morris PT   Dynamic Gait Index (Aric and Shetty Kell, 1995)   Gait Level Surface 2  (cautious, decreased arm swing)   Change in Gait Speed 1  (minor change in gait speed)   Gait and Horizontal Head Turns 3  (same gait pattern, dec rot L)   Gait with Vertical Head Turns 2  (change in pattern, felt off balance)   Gait and Pivot Turns 2   Step Over Obstacle 1  (stop and step over)   Step Around Obstacles 3   Steps 2   Total Dynamic Gait Index Score  (A score of 19 or less has been correlated to an increased risk of falls in community dwelling older adults, patients with vestibular disorders, and patients with MS.)   Total Score (out of 24) 16     Dynamic Gait Index (DGI):The DGI is a measure of balance during gait that is reliable and valid for the elderly and individuals with Parkinson's disease, MS, vestibular disorders, or s/p stroke. Gait assistive device used: None    Patient score: 16/24  Scores ?19/24 indicate an increased risk for falls according to Tiara et al 2000  Minimal Detectable Change = 2.9 in community dwelling elderly according to Donnie et al 2011    Assessment (rationale for performing, application to patient s function & care plan): baseline  Minutes billed as physical performance test part of eval

## 2022-01-06 ENCOUNTER — MYC MEDICAL ADVICE (OUTPATIENT)
Dept: INTERNAL MEDICINE | Facility: CLINIC | Age: 52
End: 2022-01-06

## 2022-01-06 ENCOUNTER — HOSPITAL ENCOUNTER (OUTPATIENT)
Dept: PHYSICAL THERAPY | Facility: CLINIC | Age: 52
Setting detail: THERAPIES SERIES
End: 2022-01-06
Attending: PHYSICAL MEDICINE & REHABILITATION
Payer: COMMERCIAL

## 2022-01-06 PROCEDURE — 97110 THERAPEUTIC EXERCISES: CPT | Mod: GP | Performed by: PHYSICAL THERAPIST

## 2022-01-06 NOTE — PROGRESS NOTES
01/04/22 0800   Quick Adds   Quick Adds Certification   Type of Visit Initial OP PT Evaluation   General Information   Start of Care Date 01/04/22   Referring Physician Yeimy Guerra ,    Orders Evaluate and Treat as Indicated   Order Date 12/08/21   Medical Diagnosis Balance problems (R26.89)   Onset of illness/injury or Date of Surgery 12/08/21   Precautions/Limitations fall precautions   Surgical/Medical history reviewed Yes   Pertinent history of current problem (include personal factors and/or comorbidities that impact the POC) Patient referred to this clinic with balance issues. Pt reports that she is being worked up for a lumar , SI surgery as well as re do of cervical fusion.  She has a history of long standing spine pain , arthritis, RA, vit d deficiency, asthma/COPD on 3 l oxygen at night, depression, GERD, HTN, morbid obesity (states MD mentioned her getting gastric banding), sleep apnea. multiple epidural injections lumbar and Lumbo sacral spine, SI , Laminectory thoracic one level - 11-12, parg of L1 with drainage of epidural abcess. 8/19/2019 due to infection with implanting pain pump.  Dr. Wilhelm note dated 11/11/2021 is very thorough/see for further information.    Prior level of function comment disabled , on oxygen at night 3 L,  at home has bowflex, more strengthening    Diagnostic Tests Other  (see EPIC and 11/11/21 note Dr. Wilhelm. )   Current Community Support Family/friend caregiver   Patient role/Employment history Disabled   Home/Community Accessibility Comments 2 step entry , 12-15 with rail to the basement - lives in [place] and limits going to the basement.  One rail and banister for stairs to basement.    Patient/Family Goals Statement States that her balance seems off , tips to R   General Information Comments States she is scheduled for several surgeries, deals with depression    Fall Risk Screen   Fall screen completed by PT   Have you fallen 2 or more times in the  past year? No   Have you fallen and had an injury in the past year? No   Fall screen comments Fell in early fall of 2021,  beginning to fall and then catches.    Abuse Screen (yes response referral indicated)   Feels Unsafe at Home or Work/School no   Feels Threatened by Someone no   Does Anyone Try to Keep You From Having Contact with Others or Doing Things Outside Your Home? no   Physical Signs of Abuse Present no   Pain   Pain comments neck , mid back and whole low bottom - and points to low back, Describe as contant   Vitals Signs   Heart Rate 64   SpO2 96   Cognitive Status Examination   Orientation orientation to person, place and time   Level of Consciousness alert   Follows Commands and Answers Questions 100% of the time   Posture   Posture Comments forward shoulders, flat spine, R shoulder lower, R knee flexed in standing, lowback flat, R greater trochanter lower. toe out R    Range of Motion (ROM)   ROM Comment gastrocs neutral L , R plus 5-8 degrees   Strength   Strength Comments Seated hip flexion 5/5 R 4+/5 L,  knee extension 5/5 r, 4+/5 L,  hamstrings sitting 4+/5 L, R 5/5, dorsiflexion 5/5 R, 4/5 L,    Bed Mobility   Bed Mobility Comments IND   Transfer Skills   Transfer Comments able to complete sit <> stand without UE support/assist   Locomotion   Wheel Chair Mobility Comments n/a   Gait   Gait Comments ambulation without a device with guarded gait pattern, slower pace, arms splayed at sides L more than R, no Reciprocal arm swing, Longer stance time on the R.    Gait Special Tests   Gait Special Tests 25 FOOT TIMED WALK;DYNAMIC GAIT INDEX   Gait Special Tests 25 Foot Timed Walk   Seconds 10.56   Steps 18 Steps   Comments no device   Gait Special Tests Dynamic Gait Index   Score out of 24 16   Comments ed in use of AD, see separate documentation   Balance   Balance Comments regular stance with sway EO, EC 30 sec increased sway,   regular stance on foam 21 sec EO and 12 sec EC.    Balance Special  "Tests   Balance Special Tests Single leg stance right;Single leg stance left;Romberg;Sharpened Romberg;Sit to stand reps   Balance Special Tests Single Leg Stance Right,   Right, seconds 3.48 Seconds   Balance Special Tests Single Leg Stance Left   Left, seconds 12.1 Seconds   Balance Special Tests Romberg   Comments reg surface EO and EC 30 sec (SBA for safety with EC)   Foam EO 30  sec, EC not able to complete   Balance Special Tests Sit to Stand Reps in 30 Seconds   Reps in 30 seconds 3.5   Height 18   Comments arms across chest, decreased control with sitting back down, stand rise upper back and then extends legs.    Sensory Examination   Sensory Perception Comments R upper thigh laterally has been numb for years. \"I get numbing in my feet but it isn't regular\"   Coordination   Coordination Comments WNL   Muscle Tone   Muscle Tone Comments no abnormal tone noted   Modality Interventions   Planned Modality Interventions Comments per therapist discretion   Planned Therapy Interventions   Planned Therapy Interventions balance training;gait training;joint mobilization;neuromuscular re-education;ROM;strengthening;stretching;transfer training;manual therapy   Clinical Impression   Criteria for Skilled Therapeutic Interventions Met yes, treatment indicated   PT Diagnosis decline in tolerance to activity, deconditioned, gait impairments   Influenced by the following impairments postural impairments, decreased L LE strength, decreased ankle ROM L, pain, muscle tightness/restrictions RA, arthritis and COPD   Functional limitations due to impairments decreased tolerance to activity in home, out of home.    Clinical Presentation Evolving/Changing   Clinical Presentation Rationale function changes with pain, pulmonary status arthritis  multiple co morbidites   Clinical Decision Making (Complexity) Moderate complexity   Therapy Frequency 2 times/Week   Predicted Duration of Therapy Intervention (days/wks) 8 weeks   Risk & " Benefits of therapy have been explained Yes   Patient, Family & other staff in agreement with plan of care Yes   Education Assessment   Preferred Learning Style Listening;Reading;Demonstration;Pictures/video   Barriers to Learning Emotional;Physical  (pain)   GOALS   PT Eval Goals 1;2;3;4;5   Goal 1   Goal Identifier 1 - HEP   Goal Description Zayra to be independent in an appropriate HEP to address her impairments and improve her function and to assist in a successful spine surgery if this occurs.    Target Date 03/01/22   Goal 2   Goal Identifier 2- endurance/6MWT   Goal Description Zayra to increase her 6MWT distance by 75 m or greater from initial testing to demonstrate statistically significant improvement in activity tolerance per this assessment to allow her to return to tolerating more community outings.    Target Date 03/01/22   Goal 3   Goal Identifier 3- balance   Goal Description Zayra to improve her balance with demonstrating abiltiy to complete Rhomberg on foam cushion with EC x 30 sec.  (Not able to do at eval)   Target Date 03/01/22   Goal 4   Goal Identifier 4- DGI   Goal Description Zayra to score 20 or greater on DGI to demonstrate improved safety with gait for decreased fall risk and greater safety in community.    Target Date 03/01/22   Goal 5   Goal Identifier sit <> stand   Goal Description Zayra to complete 7 sit <> stand from 18 inch chair without UE support /arms across chest to demonstrate improved functional LE strength.    Target Date 03/01/22   Total Evaluation Time   PT Emily, Moderate Complexity Minutes (62275) 35   Therapy Certification   Certification date from 01/04/22   Certification date to 03/01/22   Medical Diagnosis Balance problems (R26.89)

## 2022-01-07 ENCOUNTER — TRANSFERRED RECORDS (OUTPATIENT)
Dept: HEALTH INFORMATION MANAGEMENT | Facility: CLINIC | Age: 52
End: 2022-01-07
Payer: COMMERCIAL

## 2022-01-07 NOTE — PROGRESS NOTES
[facility]                                                                                   OUTPATIENT PHYSICAL THERAPY FUNCTIONAL EVALUATION  PLAN OF TREATMENT FOR OUTPATIENT REHABILITATION  (COMPLETE FOR INITIAL CLAIMS ONLY)  Patient's Last Name, First Name, M.I.  YOB: 1970  Zayra Ramirez     Provider's Name   [facility]   Medical Record No.  [id number]     Start of Care Date:  01/04/22   Onset Date:  12/08/21   Type:     _X__PT   ____OT  ____SLP Medical Diagnosis:  Balance problems (R26.89)     PT Diagnosis:  decline in tolerance to activity, deconditioned, gait impairments Visits from SOC:  1                              __________________________________________________________________________________  Plan of Treatment/Functional Goals:  balance training,gait training,joint mobilization,neuromuscular re-education,ROM,strengthening,stretching,transfer training,manual therapy           GOALS  1 - HEP  Zayra to be independent in an appropriate HEP to address her impairments and improve her function and to assist in a successful spine surgery if this occurs.   03/01/22    2- endurance/6MWT  Zayra to increase her 6MWT distance by 75 m or greater from initial testing to demonstrate statistically significant improvement in activity tolerance per this assessment to allow her to return to tolerating more community outings.   03/01/22    3- balance  Zayra to improve her balance with demonstrating abiltiy to complete Rhomberg on foam cushion with EC x 30 sec.  (Not able to do at eval)  03/01/22    4- DGI  Zayra to score 20 or greater on DGI to demonstrate improved safety with gait for decreased fall risk and greater safety in community.   03/01/22    sit <> stand  Zayra to complete 7 sit <> stand from 18 inch chair without UE support /arms across chest to  demonstrate improved functional LE strength.   03/01/22     Therapy Frequency:  2 times/Week   Predicted Duration of Therapy Intervention:  8 weeks    Rosy Morris, PT                                    I CERTIFY THE NEED FOR THESE SERVICES FURNISHED UNDER        THIS PLAN OF TREATMENT AND WHILE UNDER MY CARE     (Physician co-signature of this document indicates review and certification of the therapy plan).                Certification Date From:  01/04/22   Certification Date To:  03/01/22    Referring Provider:  Yeimy Guerra , DO    Initial Assessment  See Epic Evaluation- Start of Care Date: 01/04/22

## 2022-01-13 ENCOUNTER — IMMUNIZATION (OUTPATIENT)
Dept: NURSING | Facility: CLINIC | Age: 52
End: 2022-01-13
Payer: COMMERCIAL

## 2022-01-13 PROCEDURE — 91300 PR COVID VAC PFIZER DIL RECON 30 MCG/0.3 ML IM: CPT

## 2022-01-13 PROCEDURE — 0004A PR COVID VAC PFIZER DIL RECON 30 MCG/0.3 ML IM: CPT

## 2022-01-17 ENCOUNTER — VIRTUAL VISIT (OUTPATIENT)
Dept: PHARMACY | Facility: CLINIC | Age: 52
End: 2022-01-17
Payer: COMMERCIAL

## 2022-01-17 DIAGNOSIS — Z78.9 TAKES DIETARY SUPPLEMENTS: ICD-10-CM

## 2022-01-17 DIAGNOSIS — M19.90 INFLAMMATORY ARTHRITIS: ICD-10-CM

## 2022-01-17 DIAGNOSIS — J30.2 SEASONAL ALLERGIES: ICD-10-CM

## 2022-01-17 DIAGNOSIS — Z91.030 ALLERGY TO HONEY BEE VENOM: ICD-10-CM

## 2022-01-17 DIAGNOSIS — K14.6 TONGUE PAIN: ICD-10-CM

## 2022-01-17 DIAGNOSIS — R19.7 DIARRHEA, UNSPECIFIED TYPE: Primary | ICD-10-CM

## 2022-01-17 DIAGNOSIS — G25.81 RESTLESS LEGS SYNDROME (RLS): ICD-10-CM

## 2022-01-17 DIAGNOSIS — M54.2 CHRONIC NECK PAIN: ICD-10-CM

## 2022-01-17 DIAGNOSIS — F41.9 ANXIETY: ICD-10-CM

## 2022-01-17 DIAGNOSIS — K21.9 GERD WITHOUT ESOPHAGITIS: ICD-10-CM

## 2022-01-17 DIAGNOSIS — F32.A DEPRESSION, UNSPECIFIED DEPRESSION TYPE: ICD-10-CM

## 2022-01-17 DIAGNOSIS — G89.29 CHRONIC NECK PAIN: ICD-10-CM

## 2022-01-17 DIAGNOSIS — B49 FUNGAL INFECTION: ICD-10-CM

## 2022-01-17 DIAGNOSIS — J45.909 UNCOMPLICATED ASTHMA, UNSPECIFIED ASTHMA SEVERITY, UNSPECIFIED WHETHER PERSISTENT: ICD-10-CM

## 2022-01-17 DIAGNOSIS — I10 BENIGN ESSENTIAL HYPERTENSION: ICD-10-CM

## 2022-01-17 PROCEDURE — 99605 MTMS BY PHARM NP 15 MIN: CPT | Performed by: PHARMACIST

## 2022-01-17 PROCEDURE — 99607 MTMS BY PHARM ADDL 15 MIN: CPT | Performed by: PHARMACIST

## 2022-01-17 NOTE — LETTER
"Recommended To-Do List      Prepared on: 1/17/2022     You can get the best results from your medications by completing the items on this \"To-Do List.\"      Bring your To-Do List when you go to your doctor. And, share it with your family or caregivers.    My To-Do List:  What we talked about: What I should do:                     "

## 2022-01-17 NOTE — PROGRESS NOTES
Medication Therapy Management (MTM) Encounter    ASSESSMENT:                            Medication Adherence/Access: No issues identified    GERD: Stable.    Diarrhea: Steadily improving, and the patient may benefit from refilling cholestyramine and potential dose increase to twice daily until follow up with Gastroenterology.     Chronic Pain/Inflammatory Arthritis: Due to somewhat worsening symptoms again since stopping prednisone taper, may benefit from follow up with Rheumatology to discuss next steps such as dose increase of methotrexate, unclear if current symptoms are arthritis related versus chronic pain from back/neck.     Restless Leg Syndrome: Stable.    Allergic Rhinitis/Asthma: The patient is currently experiencing daytime drowsiness where multiple of her medications could be playing a role. She may benefit from taking her cetirizine at bedtime to see if that helps with this issue. Defer to Pulmonology for current plan for asthma.      Hypertension: The patient would likely benefit from taking her lisinopril-hydrochlorothiazide in the morning instead of at night. This should help decrease nocturia and improve patient sleep.    Depression/Anxiety: Stable.    Supplements: The patient's supplemental magnesium may be contributing to her diarrhea. It may be beneficial for the patient to trial off of the magnesium for a week to see if her symptoms improve or change in any way.    Bee Allergy: Patient instructed to try reaching out to her pharmacy now that it is the new year and see if the EpiPen (or alternative) is covered by her insurance, if not then pharmacist can try to assist with coverage issues.     Fungal Infection: Stable.    Tongue Pain/Thrush: Patient instructed to follow up with ENT regarding return of thrush symptoms. Patient agrees with this, triamcinolone could have been contributing factor for thrush returning.     PLAN:                            1. Pharmacist will check in Gastroenterology  about cholestyramine refill.     2. Follow up with Rheumatology to check in    3. Could try taking cetirizine to bedtime.    4. Change to taking lisinopril-hydrochlorothiazide to AM.      5. Follow backup with ENT about thrush returning.    6. Contact your pharmacy regarding refilling your EpiPen now that it is the new year. Please let us know if it is still not being covered by your insurance.    7. Could consider holding magnesium to see if improve gastrointestinal issues.    Future: assess eye drop on list, missed this last comprehensive review of medications     Follow-up: 2-3 months     SUBJECTIVE/OBJECTIVE:                          Zayra Ramirez is a 51 year old female called for an initial visit. She was referred to me from Odette More PA-C.      Reason for visit: comprehensive review of medications - referred as considering bariatric surgery.     Allergies/ADRs: Reviewed in chart  Past Medical History: Reviewed in chart  Tobacco: She reports that she quit smoking about 2 months ago. Her smoking use included cigarettes. She started smoking about 26 years ago. She has a 30.00 pack-year smoking history. She has never used smokeless tobacco. Quit November 14, 2021.  Alcohol: Less than 1 beverage / month  Caffeine: 3 cans Mtn Dew per day    Medication Adherence/Access: Patient takes medications directly from bottles. She has all her bottles stored in shoe box, half is AM and half is PM pills.   Patient takes medications 2 time(s) per day.   Per patient, misses medication 0 times per week.     GERD:   Prilosec (omeprazole) 40 mg once daily    Pt reports no current symptoms. No History of stomach bleed or ulcer. Patient feels that current regimen is effective.    Diarrhea:       Reports diarrhea that has persisted since starting COVID-19 vaccine. No abdominal pain. Was seen by Gastroenterology 11/09/2021, prescribed cholestyramine. C. Difficile and enteric bacteria testing all negative. Did find cholestyramine  "helpful, completed therapy. Reports that stool is still loose, but no longer watery.  Reports \"not getting in enough\" water daily, 3 to 4 16oz bottles water daily. Having still having 3-5 bowel movements per day, 2-3 bowel movements in morning. She was  cholestyramine from other medications when taking. Reports that she is out of cholestyramine and completed what she was given.  She reports she did 1 level spoonful which was the 4 grams.     Chronic Pain/Inflammatory Arthritis:  Methotrexate 22.5 mg weekly Monday  Folic acid 1 mg daily every day but Monday  Celecoxib 200 mg daily AM   Pregabalin 150 mg twice daily  Cyclobenzaprine 10 mg bedtime   Acetaminophen 500-1000 mg as needed      Plan for back and neck surgery, but was referred to [facility] by Ortho as required weight loss before neck and back surgery. Was given 3 week prednisone taper last visit with Rheumatology due to recent arthritis flare, was having increased daily joint pain in small joints of hands and feet along with prolonged morning stiffness. Plan if reflared after completed prednisone would increase methotrexate to 25 mg weekly +/- TNF inhibitor consideration. As of now after completing prednisone, greater issues of joint pain at shoulders/neck and somewhat in hands. Reports that morning stiffness is present but not lasting as long, ~ half hour. Previously was laying in bed 3 hours or more. She is unsure where pain is coming from at this point.   Specialist: Rheumatology, Dr. Panchito Saenz, last seen on 12/17/2021; Orthopedics, Dr. Wilhelm, last seen 11/11/2021    Lab Results   Component Value Date    ALT 19 11/10/2021    ALT 21 07/10/2021     AST   Date Value Ref Range Status   11/10/2021 11 0 - 45 U/L Final   07/10/2021 15 0 - 45 U/L Final     Creatinine   Date Value Ref Range Status   11/10/2021 0.99 0.52 - 1.04 mg/dL Final   07/10/2021 1.01 0.52 - 1.04 mg/dL Final     Restless Leg Syndrome:   Pregabalin 150 mg twice " "daily   Ropinirole 0.5 mg bedtime   Ferrous Sulfate 325 mg daily     Reports that restless leg symptoms are stable. Reports that pregabalin and ropinirole \"mellows out symptoms\". During daytime \"legs were going nuts\" and now improved. Medication side effects: None.     Ferritin   Date Value Ref Range Status   09/30/2020 149 8 - 252 ng/mL Final     Allergic Rhinitis/Asthma:  Albuterol inhaler 2 puffs every 6 hours as needed  (uses 3+ times per week)  Cetirizine 10 mg daily in AM   Montelukast 10 mg daily bedtime     History of being off ICS inhaler since Feb 2021, thrush for >1 year. Pulmonology wanted to have her solely using rescue inhaler and oxygen at night. But plans to potentially change oxygen to during the day. Following up with pulmonology again in a couple weeks/month. She hasn't been waking up in the night from shortness of breath or wheezing. Some drowsiness during day at time, unsure was attributed to.  Triggers include: pollens, strong odors and fumes and cold air.  Asthma Action Plan on file: NO  ACT Total Scores 12/5/2019 2/26/2020 6/11/2021   ACT TOTAL SCORE (Goal Greater than or Equal to 20) 14 13 8   In the past 12 months, how many times did you visit the emergency room for your asthma without being admitted to the hospital? 0 0 0   In the past 12 months, how many times were you hospitalized overnight because of your asthma? 0 0 0     Hypertension:   Lisinopril-hydrochlorothiazide 20-25 mg daily in PM.      Patient does not self-monitor blood pressure.  Patient reports the following medication side effects: urinating a lot at night. No issues of dizziness or lightheadedness regularly - did describe lightheadedness is rare.   BP Readings from Last 3 Encounters:   11/29/21 102/66   11/24/21 125/85   11/02/21 118/77     Creatinine   Date Value Ref Range Status   11/10/2021 0.99 0.52 - 1.04 mg/dL Final   07/10/2021 1.01 0.52 - 1.04 mg/dL Final     Potassium   Date Value Ref Range Status   08/31/2021 " "3.2 (L) 3.4 - 5.3 mmol/L Final   04/08/2021 3.4 3.4 - 5.3 mmol/L Final     Depression/Anxiety:   Duloxetine 120 mg daily bedtime   Buspirone 30 mg in AM 30 mg bedtime    She reports that she is doing \"really good\". [place] are hard, but \"coming out of it\". Reports that anxiety is also stable. Feeling \"balanced\".  Reports issues with sleep are more related to pain. When she does sleep, sleeps fine. Reports she tries to be in bed from 7 Pm to 7 Am to ensure she gets enough sleep.     Supplements:   Vitamin D 2000 international unit(s) daily   Vitamin C 1000 mg daily   Zinc 220 mg daily  Magnesium 400 mg daily  Folic acid 1 mg daily  Tumeric 1000 mg twice daily    Reports that she stopped ferrous sulfate because hard of stomach. Patient started taking magnesium due to recommendation by a friend, and she is unsure whether it is providing any benefit.  Hemoglobin   Date Value Ref Range Status   11/10/2021 12.5 11.7 - 15.7 g/dL Final   07/10/2021 11.9 11.7 - 15.7 g/dL Final     Ferritin   Date Value Ref Range Status   09/30/2020 149 8 - 252 ng/mL Final     Bee Allergy:  Epipen    As of last year, reports that Epipen wasn't covered. Reports that she enjoys gardening so was fearful to be out. She reports she isn't sure if 2022 if there still is coverage issues.     Fungal Infection:   Nystatin topical as needed    No concerns.    Tongue Pain/Thrush:   Triamcinolone - stopped     Was seen by ENT 12/7/2021 for follow up on mouth ulcer that had resolved, but also discussed pain under tongue. Was given topical triamcinolone to use under tongue. She reports that she is noticing that thrush has returned, \"cottage cheese build up\". She hasn't reached out to ENT yet regarding it returning.   ----------------  I spent 55 minutes with this patient today (an extra 15 minutes was spent creating the Medication Action Plan). All changes were made via collaborative practice agreement with provider. A copy of the visit note was provided to " the patient's provider(s).    The patient was sent via [employer] a summary of these recommendations.     Lauren Bloch, PharmD, BCACP   Medication Therapy Management Pharmacist   [facility]    Telemedicine Visit Details  Type of service:  Telephone visit  Start Time: 10:00 AM  End Time: 10:55 AM  Originating Location (patient location): Home  Distant Location (provider location):  [facility]     Medication Therapy Recommendations  Benign essential hypertension    Current Medication: lisinopril-hydrochlorothiazide (ZESTORETIC) 20-25 MG tablet   Rationale: Undesirable effect - Adverse medication event - Safety   Recommendation: Provide Education - AM   Status: Patient Agreed - Adherence/Education         Diarrhea, unspecified type    Rationale: Untreated condition - Needs additional medication therapy - Indication   Recommendation: Start Medication - cholestyramine 4 g packet   Status: Contact Provider - Awaiting Response         Seasonal allergies    Current Medication: cetirizine (ZYRTEC) 10 MG tablet   Rationale: Does not understand instructions - Adherence - Adherence   Recommendation: Provide Education   Status: Patient Agreed - Adherence/Education         Takes dietary supplements    Rationale: Undesirable effect - Adverse medication event - Safety   Recommendation: Discontinue Medication   Status: Accepted - no CPA Needed

## 2022-01-17 NOTE — LETTER
January 17, 2022  Zayra Ramirez  [address] [place] [address]    [facility] [place]        Thank you for talking with me on Jan 17, 2022 about your health and medications. As a follow-up to our conversation, I have included two documents:      1. Your Recommended To-Do List has steps you should take to get the best results from your medications.  2. Your Medication List will help you keep track of your medications and how to take them.    If you want to talk about these documents, please call Lauren T. Bloch, RPH at phone: [phone], Monday-Friday 8-4:30pm.    I look forward to working with you and your doctors to make sure your medications work well for you.    Sincerely,    Lauren T. Bloch, RPH

## 2022-01-17 NOTE — LETTER
_  Medication List        Prepared on: 1/17/2022     Bring your Medication List when you go to the doctor, hospital, or   emergency room. And, share it with your family or caregivers.     Note any changes to how you take your medications.  Cross out medications when you no longer use them.    Medication How I take it Why I use it Prescriber   albuterol (PROAIR HFA, PROVENTIL HFA, VENTOLIN HFA) 108 (90 BASE) MCG/ACT inhaler Inhale 2 puffs into the lungs every 6 hours as needed for shortness of breath / dyspnea or wheezing (PT last dose 1.23.2020)  Asthma  Dr. Jennifer Bonilla   busPIRone HCl (BUSPAR) 30 MG tablet Take 30 mg by mouth 2 times daily  Anxiety  Dr. Saumya Hinson    carboxymethylcellulose (CARBOXYMETHYLCELLULOSE SODIUM) 0.5 % SOLN ophthalmic solution Place 1 drop into both eyes 3 times daily as needed for dry eyes Dry eye syndrome of both eyes Leodan Gan MD   celecoxib (CELEBREX) 200 MG capsule Take 1 capsule (200 mg) by mouth every morning Chronic Neck Pain; Chronic midline low back pain with right-sided sciatica Panchito Saenz MD   cetirizine (ZYRTEC) 10 MG tablet Take 1 tablet (10 mg) by mouth daily Seasonal allergic rhinitis, unspecified trigger NICKO Martinez CNP   cholecalciferol ( ULTRA STRENGTH) 2000 units CAPS Take 2,000 Units by mouth every evening  General Health  Patient Reported   cyclobenzaprine (FLEXERIL) 10 MG tablet Take 1 tablet (10 mg) by mouth At Bedtime Spinal Epidural Abscess NICKO Martinez CNP   DULoxetine (CYMBALTA) 60 MG capsule Take 120 mg by mouth At Bedtime  Depression  Dr. Saumya Hinson   EPINEPHrine (EPIPEN/ADRENACLICK/OR ANY BX GENERIC EQUIV) 0.3 MG/0.3ML injection 2-pack INJECT 0.3ML INTO THE MUSCLE ONCE AS NEEDED FOR ANAPHYLAXIS Bee Allergy  NICKO Martinez CNP   folic acid (FOLVITE) 1 MG tablet Take 1 tablet (1 mg) by mouth daily History of seronegative inflammatory arthritis Panchito Seanz MD   lisinopril-hydrochlorothiazide  (ZESTORETIC) 20-25 MG tablet Take 1 tablet by mouth daily Essential Hypertension Francoise Chew MD   methotrexate sodium 2.5 MG TABS Take 9 tablets (22.5 mg) by mouth once a week History of seronegative inflammatory arthritis Panchito Saenz MD   montelukast (SINGULAIR) 10 MG tablet Take 10 mg by mouth At Bedtime Asthma/Allergies  Dr. Jennifer Bonilla   nystatin (MYCOSTATIN) 697901 UNIT/GM external cream Apply topically 2 times daily Fungal Infection NICKO Martinez CNP   omeprazole (PRILOSEC) 40 MG DR capsule Take 1 capsule (40 mg) by mouth daily Gastroesophageal reflux disease, unspecified whether esophagitis present Abhi Villafuerte PA-C   pregabalin (LYRICA) 150 MG capsule Take 1 capsule (150 mg) by mouth 2 times daily Restless Leg Syndrome Bennett Ezra Goltz, PA-C   rOPINIRole (REQUIP) 0.5 MG tablet Take 1 tablet (0.5 mg) by mouth At Bedtime Take 1 tab by mouth at bedtime. Restless Leg Syndrome Bennett Ezra Goltz, PA-C   triamcinolone (KENALOG) 0.1 % external ointment Apply topically 2 times daily Rash and Nonspecific Skin Eruption Morro Mijares MD   vitamin C 500 MG TABS Take 500 mg by mouth daily (with lunch)  General Supplement NICKO Rosado CNP   zinc sulfate (ZINCATE) 220 (50 Zn) MG capsule Take 220 mg by mouth daily (with lunch)  General Supplement  NICKO Rosado CNP          Add new medications, over-the-counter drugs, herbals, vitamins, or  minerals in the blank rows below.    Medication How I take it Why I use it Prescriber                          Allergies:      bee venom; bees; doxycycline; erythromycin; hydrocodone-acetaminophen        Side effects I have had:              Other Information:             My notes and questions:

## 2022-01-17 NOTE — Clinical Note
We did comprehensive review of medications for pre-bariatric surgery - we did not discuss weight loss medication(s) today (none have been prescribed yet) as had other major complaints.

## 2022-01-17 NOTE — PATIENT INSTRUCTIONS
Recommendations from today's MTM visit:                                                    MTM (medication therapy management) is a service provided by a clinical pharmacist designed to help you get the most of out of your medicines.   Today we reviewed what your medicines are for, how to know if they are working, that your medicines are safe and how to make your medicine regimen as easy as possible.      1. Pharmacist will check in Gastroenterology about cholestyramine refill.     2. Follow up with Rheumatology to check in    3. Could try taking cetirizine to bedtime.    4. Change to taking lisinopril-hydrochlorothiazide to in the morning.      5. Follow backup with ENT about thrush returning.    6. Contact your pharmacy regarding refilling your EpiPen now that it is the new year. Please let us know if it is still not being covered by your insurance.    7. Could consider holding magnesium to see if improve gastrointestinal issues.    Follow-up: 2-3 months     It was great to speak with you today.  I value your experience and would be very thankful for your time with providing feedback on our clinic survey. You may receive a survey via email or text message in the next few days.       My Clinical Pharmacist's contact information:                                                      Please feel free to contact me with any questions or concerns you have.      Immanuel Negrete, PD4 student pharmacist  Lauren Bloch, PharmD  Medication Therapy Management Pharmacist   [facility]

## 2022-01-17 NOTE — LETTER
"Recommended To-Do List      Prepared on: 1/17/2022     You can get the best results from your medications by completing the items on this \"To-Do List.\"      Bring your To-Do List when you go to your doctor. And, share it with your family or caregivers.    My To-Do List:  What we talked about: What I should do:   A new medication that may be of benefit to you    Pharmacist is reaching out to your provider about restarting cholestyramine (QUESTRAN) until you follow up with Gastroenterology.           What we talked about: What I should do:   An issue with your medication    Education: Change lisinopril/hydrocholothiazide to taking in AM.            What we talked about: What I should do:   The importance of taking your medication as intended    Education: change cetirizine to taking at bedtime, this may help somewhat with drowsiness.            What we talked about: What I should do:   An issue with your medication    Stop taking magnesium           What we talked about: What I should do:                   "

## 2022-01-18 ENCOUNTER — TELEPHONE (OUTPATIENT)
Dept: OTOLARYNGOLOGY | Facility: CLINIC | Age: 52
End: 2022-01-18

## 2022-01-18 ENCOUNTER — OFFICE VISIT (OUTPATIENT)
Dept: OTOLARYNGOLOGY | Facility: CLINIC | Age: 52
End: 2022-01-18
Payer: COMMERCIAL

## 2022-01-18 ENCOUNTER — HOSPITAL ENCOUNTER (OUTPATIENT)
Dept: PHYSICAL THERAPY | Facility: CLINIC | Age: 52
Setting detail: THERAPIES SERIES
End: 2022-01-18
Attending: PHYSICAL MEDICINE & REHABILITATION
Payer: COMMERCIAL

## 2022-01-18 VITALS
SYSTOLIC BLOOD PRESSURE: 117 MMHG | OXYGEN SATURATION: 92 % | HEART RATE: 79 BPM | DIASTOLIC BLOOD PRESSURE: 70 MMHG | TEMPERATURE: 99.3 F | BODY MASS INDEX: 38.76 KG/M2 | HEIGHT: 64 IN | WEIGHT: 227 LBS

## 2022-01-18 DIAGNOSIS — B37.0 THRUSH: Primary | ICD-10-CM

## 2022-01-18 PROCEDURE — 99213 OFFICE O/P EST LOW 20 MIN: CPT | Performed by: OTOLARYNGOLOGY

## 2022-01-18 PROCEDURE — 97112 NEUROMUSCULAR REEDUCATION: CPT | Mod: GP | Performed by: PHYSICAL THERAPIST

## 2022-01-18 RX ORDER — CLOTRIMAZOLE 10 MG/1
LOZENGE ORAL
Qty: 70 LOZENGE | Refills: 1 | Status: SHIPPED | OUTPATIENT
Start: 2022-01-18 | End: 2022-02-18

## 2022-01-18 ASSESSMENT — MIFFLIN-ST. JEOR: SCORE: 1626.73

## 2022-01-18 ASSESSMENT — PAIN SCALES - GENERAL: PAINLEVEL: NO PAIN (0)

## 2022-01-18 NOTE — PATIENT INSTRUCTIONS
1. Please follow-up in clinic in 6 weeks   2. Please call the ENT clinic with any questions,concerns, new or worsening symptoms.    -Clinic number is [phone]   - Brittney's direct line (Dr. Mijares's nurse) [phone]

## 2022-01-18 NOTE — PROGRESS NOTES
"  Otolaryngology Clinic    Name: Zayra Ramirez  MRN: [id number]  Age: 51 year old  : 2021      Chief Complaint:   Follow up     History of Present Illness:   Zayra Ramirez is a 51 year old female with rheumatoid arthritis on methotrexate who presents for follow up. I last saw her on 21 for 10 days of increasing sensitivity and soreness on the right underside of her tongue. She reported to us that magic mouthwash was only partially abating the discomfort briefly. Upon my examination at that time I reassured her that her chronic ulcer had resolved and there was no clear area for me to biopsy. She did apply kenalog to the area and there was some temporary relief. Since then she has had rebuilding of white gunk on the roof of her mouth as well as soreness throughout the tongue base. She will be going to [place] for a month and return at the end of March.      Review of Systems:   Pertinent items are noted in HPI or as in patient entered ROS below, remainder of complete ROS is negative.    ENT ROS 2022   Constitutional -   Neurology -   Psychology -   Eyes -   Ears, Nose, Throat Ringing/noise in ears, Sore throat, Trouble swallowing, Hoarseness   Cardiopulmonary -   Gastrointestinal/Genitourinary Heartburn/indigestion   Musculoskeletal Sore or stiff joints, Swollen joints, Back pain, Neck pain   Allergy/Immunology Allergies or hay fever   Hematologic -   Endocrine -   Skin -   Other -        Physical Exam:   /70 (BP Location: Left arm, Patient Position: Sitting, Cuff Size: Adult Large)   Pulse 79   Temp 99.3  F (37.4  C) (Temporal)   Ht 1.613 m (5' 3.5\")   Wt 103 kg (227 lb)   LMP  (LMP Unknown)   SpO2 92%   BMI 39.58 kg/m       PHYSICAL EXAMINATION:    Constitutional:  The patient was unaccompanied, well-groomed, and in no acute distress.    Skin:  Warm and pink.    Neurologic:  Alert and oriented x 3.  CN's III-XII within normal limits.  Voice normal.   Psychiatric:  The " patient's affect was calm, cooperative, and appropriate.    Respiratory:  Breathing comfortably without stridor or exertion of accessory muscles.    Eyes: Extraocular movement intact.    Head:  Normocephalic and atraumatic.  No lesions or scars.    Ears:  Pinnae and tragus non-tender.  EAC's and TM's were clear.    Nose:  Sinuses were non-tender.  Anterior rhinoscopy revealed midline septum and absence of purulence or polyps.    OC/OP: small ulcer on right tongue. Signs of white scrape-able lesions on the roof of the mouth, right and left tongue.  No abnormal lymph tissue in the oropharynx.  The pterygoid region is non-tender.    Neck:  Supple with normal laryngeal and tracheal landmarks.  The parotid beds were without masses.  No palpable thyroid.  Lymphatic:  There is no palpable lymphadenopathy in the neck.      Assessment and Plan:  No diagnosis found.  Zayra Ramirez is a 51 year old female presenting for follow-up of sensitivity and soreness under the right aspect of her tongue.  It seems that there has been fungal advancement since our last appointment that now involves the roof of the mouth and left lateral tongue. I will start her on Mycelex. She is requesting speech therapy, which she has attended in the past, and we will help coordinate this. I would like to see her back after her trip to [place].      Scribe Disclosure:  I, Jeremy Trujillo, am serving as a scribe to document services personally performed by Morro Mijares MD at this visit, based upon the provider's statements to me. All documentation has been reviewed by the aforementioned provider prior to being entered into the official medical record.

## 2022-01-18 NOTE — LETTER
"2022       RE: Zayra Ramirez  [address] [place] [address]     Dear Colleague,    Thank you for referring your patient, Zayra Ramirez, to the [facility] [place] at [facility]. Please see a copy of my visit note below.      Otolaryngology Clinic    Name: Zayra Ramirez  MRN: [id number]  Age: 51 year old  : 2021      Chief Complaint:   Follow up     History of Present Illness:   Zayra Ramirez is a 51 year old female with rheumatoid arthritis on methotrexate who presents for follow up. I last saw her on 21 for 10 days of increasing sensitivity and soreness on the right underside of her tongue. She reported to us that magic mouthwash was only partially abating the discomfort briefly. Upon my examination at that time I reassured her that her chronic ulcer had resolved and there was no clear area for me to biopsy. She did apply kenalog to the area and there was some temporary relief. Since then she has had rebuilding of white gunk on the roof of her mouth as well as soreness throughout the tongue base. She will be going to [place] for a month and return at the end of March.      Review of Systems:   Pertinent items are noted in HPI or as in patient entered ROS below, remainder of complete ROS is negative.    ENT ROS 2022   Constitutional -   Neurology -   Psychology -   Eyes -   Ears, Nose, Throat Ringing/noise in ears, Sore throat, Trouble swallowing, Hoarseness   Cardiopulmonary -   Gastrointestinal/Genitourinary Heartburn/indigestion   Musculoskeletal Sore or stiff joints, Swollen joints, Back pain, Neck pain   Allergy/Immunology Allergies or hay fever   Hematologic -   Endocrine -   Skin -   Other -        Physical Exam:   /70 (BP Location: Left arm, Patient Position: Sitting, Cuff Size: Adult Large)   Pulse 79   Temp 99.3  F (37.4  C) (Temporal)   Ht 1.613 m (5' 3.5\") "   Wt 103 kg (227 lb)   LMP  (LMP Unknown)   SpO2 92%   BMI 39.58 kg/m       PHYSICAL EXAMINATION:    Constitutional:  The patient was unaccompanied, well-groomed, and in no acute distress.    Skin:  Warm and pink.    Neurologic:  Alert and oriented x 3.  CN's III-XII within normal limits.  Voice normal.   Psychiatric:  The patient's affect was calm, cooperative, and appropriate.    Respiratory:  Breathing comfortably without stridor or exertion of accessory muscles.    Eyes: Extraocular movement intact.    Head:  Normocephalic and atraumatic.  No lesions or scars.    Ears:  Pinnae and tragus non-tender.  EAC's and TM's were clear.    Nose:  Sinuses were non-tender.  Anterior rhinoscopy revealed midline septum and absence of purulence or polyps.    OC/OP: small ulcer on right tongue. Signs of white scrape-able lesions on the roof of the mouth, right and left tongue.  No abnormal lymph tissue in the oropharynx.  The pterygoid region is non-tender.    Neck:  Supple with normal laryngeal and tracheal landmarks.  The parotid beds were without masses.  No palpable thyroid.  Lymphatic:  There is no palpable lymphadenopathy in the neck.      Assessment and Plan:  No diagnosis found.  Zayra Ramirez is a 51 year old female presenting for follow-up of sensitivity and soreness under the right aspect of her tongue.  It seems that there has been fungal advancement since our last appointment that now involves the roof of the mouth and left lateral tongue. I will start her on Mycelex. She is requesting speech therapy, which she has attended in the past, and we will help coordinate this. I would like to see her back after her trip to [place].      Scribe Disclosure:  I, Jeremy Trujillo, am serving as a scribe to document services personally performed by Morro Mijares MD at this visit, based upon the provider's statements to me. All documentation has been reviewed by the aforementioned provider prior to being entered into  the official medical record.       Again, thank you for allowing me to participate in the care of your patient.      Sincerely,    Morro Mijares MD

## 2022-01-18 NOTE — TELEPHONE ENCOUNTER
LVM for patient regarding scheduling a Return in about 6 weeks (around 3/1/2022) for using a phone visit, using a video visit. Provided ENT Clinic number for scheduling needs.

## 2022-01-18 NOTE — NURSING NOTE
"Chief Complaint   Patient presents with     RECHECK     reoccurance of thrush       Blood pressure 117/70, pulse 79, temperature 99.3  F (37.4  C), temperature source Temporal, height 1.613 m (5' 3.5\"), weight 103 kg (227 lb), SpO2 92 %, not currently breastfeeding.    Mabel Obrien, EMT    "

## 2022-01-20 ENCOUNTER — HOSPITAL ENCOUNTER (OUTPATIENT)
Dept: PHYSICAL THERAPY | Facility: CLINIC | Age: 52
Setting detail: THERAPIES SERIES
End: 2022-01-20
Attending: PHYSICAL MEDICINE & REHABILITATION
Payer: COMMERCIAL

## 2022-01-20 DIAGNOSIS — Z87.39 HISTORY OF SERONEGATIVE INFLAMMATORY ARTHRITIS: ICD-10-CM

## 2022-01-20 DIAGNOSIS — G25.81 RESTLESS LEG SYNDROME: ICD-10-CM

## 2022-01-20 PROCEDURE — 97112 NEUROMUSCULAR REEDUCATION: CPT | Mod: GP | Performed by: PHYSICAL THERAPIST

## 2022-01-20 RX ORDER — METHOTREXATE 2.5 MG/1
22.5 TABLET ORAL WEEKLY
Qty: 108 TABLET | Refills: 2 | COMMUNITY
Start: 2022-01-20 | End: 2022-06-02

## 2022-01-20 NOTE — TELEPHONE ENCOUNTER
Reason for Call:  Other prescription    Detailed comments: patient called and states she is out of medication Pregamlin [facility] by [facility].  Would like to pickup today at [facility].  Please contact patient.  Thank you.    Phone Number Patient can be reached at: Home number on file [phone] (home)    Best Time: any    Can we leave a detailed message on this number? YES    Call taken on 1/20/2022 at 1:49 PM by Kaela Braxton

## 2022-01-20 NOTE — TELEPHONE ENCOUNTER
pregabalin (LYRICA) 150 MG capsule  Last Written Prescription Date:  11/16/2021  Last Fill Quantity: 60,   # refills: 0  Last Office Visit: 11/16/2021  Future Office visit:  4/4/2022  Next 5 appointments (look out 90 days)    Jan 27, 2022  8:30 AM  Nurse Only with RI IM MA/LPN  [facility] [address]  [facility] [address] [phone]           Routing refill request to provider for review/approval because:  Drug not on the [facility], [facility] or [facility] refill protocol or controlled substance

## 2022-01-21 RX ORDER — PREGABALIN 150 MG/1
150 CAPSULE ORAL 2 TIMES DAILY
Qty: 60 CAPSULE | Refills: 0 | Status: SHIPPED | OUTPATIENT
Start: 2022-01-21 | End: 2022-02-08

## 2022-01-22 ENCOUNTER — MYC MEDICAL ADVICE (OUTPATIENT)
Dept: OTOLARYNGOLOGY | Facility: CLINIC | Age: 52
End: 2022-01-22
Payer: COMMERCIAL

## 2022-01-25 ENCOUNTER — HOSPITAL ENCOUNTER (OUTPATIENT)
Dept: PHYSICAL THERAPY | Facility: CLINIC | Age: 52
Setting detail: THERAPIES SERIES
End: 2022-01-25
Attending: PHYSICAL MEDICINE & REHABILITATION
Payer: COMMERCIAL

## 2022-01-25 PROCEDURE — 97112 NEUROMUSCULAR REEDUCATION: CPT | Mod: GP | Performed by: PHYSICAL THERAPIST

## 2022-01-27 ENCOUNTER — HOSPITAL ENCOUNTER (OUTPATIENT)
Dept: PHYSICAL THERAPY | Facility: CLINIC | Age: 52
Setting detail: THERAPIES SERIES
End: 2022-01-27
Attending: PHYSICAL MEDICINE & REHABILITATION
Payer: COMMERCIAL

## 2022-01-27 ENCOUNTER — ALLIED HEALTH/NURSE VISIT (OUTPATIENT)
Dept: INTERNAL MEDICINE | Facility: CLINIC | Age: 52
End: 2022-01-27
Payer: COMMERCIAL

## 2022-01-27 DIAGNOSIS — Z23 NEED FOR PROPHYLACTIC VACCINATION AND INOCULATION AGAINST INFLUENZA: Primary | ICD-10-CM

## 2022-01-27 PROCEDURE — G0008 ADMIN INFLUENZA VIRUS VAC: HCPCS

## 2022-01-27 PROCEDURE — 99207 PR NO CHARGE NURSE ONLY: CPT

## 2022-01-27 PROCEDURE — 97112 NEUROMUSCULAR REEDUCATION: CPT | Mod: GP | Performed by: PHYSICAL THERAPIST

## 2022-01-27 PROCEDURE — 97110 THERAPEUTIC EXERCISES: CPT | Mod: GP | Performed by: PHYSICAL THERAPIST

## 2022-01-27 PROCEDURE — 90682 RIV4 VACC RECOMBINANT DNA IM: CPT

## 2022-02-01 ENCOUNTER — HOSPITAL ENCOUNTER (OUTPATIENT)
Dept: PHYSICAL THERAPY | Facility: CLINIC | Age: 52
Setting detail: THERAPIES SERIES
End: 2022-02-01
Attending: PHYSICAL MEDICINE & REHABILITATION
Payer: COMMERCIAL

## 2022-02-01 PROCEDURE — 97112 NEUROMUSCULAR REEDUCATION: CPT | Mod: GP | Performed by: PHYSICAL THERAPIST

## 2022-02-03 ENCOUNTER — TELEPHONE (OUTPATIENT)
Dept: OTOLARYNGOLOGY | Facility: CLINIC | Age: 52
End: 2022-02-03
Payer: COMMERCIAL

## 2022-02-03 NOTE — TELEPHONE ENCOUNTER
Writer called and LVM stating that Dr. Mijares will be out of the clinic on 2/15/22 and pts appointment will need to be moved. Pt rescheduled to 2/18/22 at 2:00pm. Writer provided call back number.      Mabel Obrien, EMT

## 2022-02-08 ENCOUNTER — HOSPITAL ENCOUNTER (OUTPATIENT)
Dept: PHYSICAL THERAPY | Facility: CLINIC | Age: 52
Setting detail: THERAPIES SERIES
End: 2022-02-08
Attending: PHYSICAL MEDICINE & REHABILITATION
Payer: COMMERCIAL

## 2022-02-08 PROCEDURE — 97140 MANUAL THERAPY 1/> REGIONS: CPT | Mod: GP | Performed by: PHYSICAL THERAPIST

## 2022-02-08 PROCEDURE — 97112 NEUROMUSCULAR REEDUCATION: CPT | Mod: GP | Performed by: PHYSICAL THERAPIST

## 2022-02-10 ENCOUNTER — HOSPITAL ENCOUNTER (OUTPATIENT)
Dept: PHYSICAL THERAPY | Facility: CLINIC | Age: 52
Setting detail: THERAPIES SERIES
End: 2022-02-10
Attending: PHYSICAL MEDICINE & REHABILITATION
Payer: COMMERCIAL

## 2022-02-10 ENCOUNTER — TELEPHONE (OUTPATIENT)
Dept: PHYSICAL MEDICINE AND REHAB | Facility: CLINIC | Age: 52
End: 2022-02-10

## 2022-02-10 PROCEDURE — 97110 THERAPEUTIC EXERCISES: CPT | Mod: GP | Performed by: PHYSICAL THERAPIST

## 2022-02-10 PROCEDURE — 97112 NEUROMUSCULAR REEDUCATION: CPT | Mod: GP | Performed by: PHYSICAL THERAPIST

## 2022-02-10 NOTE — TELEPHONE ENCOUNTER
Physical therapy POC received via fax for MD signature/ 1/4/22  Document scanned to email to PMR administration assistants to forward to Dr Guerra for docusign     Donna Rogers RN on 2/10/2022 at 2:29 PM

## 2022-02-13 ENCOUNTER — HEALTH MAINTENANCE LETTER (OUTPATIENT)
Age: 52
End: 2022-02-13

## 2022-02-15 ENCOUNTER — HOSPITAL ENCOUNTER (OUTPATIENT)
Dept: PHYSICAL THERAPY | Facility: CLINIC | Age: 52
Setting detail: THERAPIES SERIES
End: 2022-02-15
Attending: PHYSICAL MEDICINE & REHABILITATION
Payer: COMMERCIAL

## 2022-02-15 PROCEDURE — 97750 PHYSICAL PERFORMANCE TEST: CPT | Mod: GP | Performed by: PHYSICAL THERAPIST

## 2022-02-15 PROCEDURE — 97112 NEUROMUSCULAR REEDUCATION: CPT | Mod: GP | Performed by: PHYSICAL THERAPIST

## 2022-02-16 NOTE — PROGRESS NOTES
"   02/15/22 1100 10th visit note   Signing Clinician's Name / Credentials   Signing clinician's name / credentials Zayra Huerta, PT   Session Number   Session Number 10/10   Progress Note/Recertification   Recertification Due Date 03/01/22   Adult Goals   PT Eval Goals 6   Goal 1   Goal Identifier 1 - HEP   Goal Description Zayra to be independent in an appropriate HEP to address her impairments and improve her function and to assist in a successful spine surgery if this occurs.    Target Date 03/01/22   Goal Progress goal met for current program   Date Met 02/15/22   Goal 2   Goal Identifier 2- endurance/6MWT   Goal Description Zayra to increase her 6MWT distance by 75 m or greater from initial testing to demonstrate statistically significant improvement in activity tolerance per this assessment to allow her to return to tolerating more community outings.    Goal Progress 2/10/2022   640 feet without device, last 3 min with cues tc and vc on gait pattern to allow relaxation of upper body and back and decrease progressino to greater back/sciatic pain.  At 2 min 18 sec started to have increased L lowback/gluteal pain.  1/6/2022  3 min 29 sec, 400 feet, no device Meters 121.92 meters   at 120 feet started to have some difficulties with breathing and R lowback started to hurt, at 3 min less steadiness wiht balance and at end low back B hurt.   No device .improved distance/ completed the 6MWT but with cues as noted, target date extended.   Target Date 04/19/22   Goal 3   Goal Identifier 3- balance   Goal Description Zayra to improve her balance with demonstrating abiltiy to complete Rhomberg on foam cushion with EC x 30 sec.  (Not able to do at eval)   Target Date 03/01/22   Date Met 02/15/22   Goal Progress goal met, able to achieve 30\" with normal and effective ankle reactions   Goal 4   Goal Identifier 4- DGI   Goal Description Zayra to score 20 or greater on DGI to demonstrate improved safety with gait for " "decreased fall risk and greater safety in community.    Target Date 03/01/22   Goal Progress Goal met with score of 20/24   Date Met 02/15/22   Goal 5   Goal Identifier sit <> stand   Goal Description Zayra to complete 7 sit <> stand from 18 inch chair without UE support /arms across chest to demonstrate improved functional LE strength.    Target Date 04/19/22   Goal Progress NT, will continue goal with new target date   Goal 6   Goal Identifier Stairs   Goal Description Zayra will have improve functional strength and stamina to be able to climb stairs recip with 1 UE support and minimal fatigue/SOB to easily access the basement in her home.    Target Date 04/19/22   Subjective Report   Subjective Report more shoulder pain lately. starting back on prednisone for RA today. Tried flow video discussed last time and feels it is good.    Treatment Interventions   Interventions Neuromuscular Re-education;Physical Performance Test/Measures   Neuromuscular Re-education   Neuromuscular re-ed of mvmt, balance, coord, kinesthetic sense, posture, proprioception minutes (62033) 34   Skilled Intervention tc, vc, correction of movement   Patient Response limited rib cage mobility, feels restricted with inhalation, taking in deep breath. pt happy with progress of gait and postural stability.    Treatment Detail diaphramtic breathing with lateral rib cage expansion, tc at costal cage, slow breathing several seconds to inhale fully and exhale, performed in sitting, added arm raises with difficulty, shoulder pain so modified to supine, multiple reps with arm raises. standing back to wall, elbows to wall but unable to get forearms to wall do to shoulder tightness and pain, limited rib cage mobility limiting shoulder motiion overhead, discontinued due to pain in shoulders. wide LIZ on foam EC progressing gradually thru narrow LIZ until standing in rhomberg on foam EC, 30\" + in each position, total of 3.    Progress goals 3-4 met. "   Physical Performance Test/measures   Physical Performance Test/Measurement, Minutes (01827) 9   Skilled Intervention DGI and pt education on findings, progress   Patient Response 20/24   Progress DGI goal met   Education   Learner Patient   Readiness Acceptance;Eager   Method Explanation   Response Verbalizes Understanding  (no questions on HEP)   Education Comments cont HEP   Plan   Updates to plan of care Zayra has made gains in postural and gait stability, meeting both goals. She continues to work on improving her stamina and activity tolerance. Stair climbing continues to be a significant challenge. See new stair goal above. She is indep in HEP to continue progressing over the next 6 weeks while in [place] and will resume PT as indicated when she returns.    Plan for next session PT on hold x 6 weeks while in [place]. Will reassess upon return and proceed as indicated   Comments   Comments 10th visit note: see daily notes 1/4/22 through 2/15/22 for required elements.    Total Session Time   Timed Code Treatment Minutes 43   Total Treatment Time (sum of timed and untimed services) 43

## 2022-02-16 NOTE — PROGRESS NOTES
02/15/22 1100   Signing Clinician's Name / Credentials   Signing clinician's name / credentials Zayra Huerta PT   Dynamic Gait Index (Aric and Shetty [place], 1995)   Gait Level Surface 2  (5.93)   Change in Gait Speed 3  (5.93, decreased arm swing L, holding due to shoulder pain)   Gait and Horizontal Head Turns 3   Gait with Vertical Head Turns 3   Gait and Pivot Turns 3   Step Over Obstacle 2   Step Around Obstacles 3   Steps 1   Total Dynamic Gait Index Score  (A score of 19 or less has been correlated to an increased risk of falls in community dwelling older adults, patients with vestibular disorders, and patients with MS.)   Total Score (out of 24) 20   Dynamic Gait Index (DGI):The DGI is a measure of balance during gait that is reliable and valid for the elderly and individuals with Parkinson's disease, MS, vestibular disorders, or s/p stroke. Gait assistive device used: None    Scores ?19 /24 indicate an increased risk for falls according to Tiara et al 2000  Minimal Detectable Change = 2.9 in community dwelling elderly according to Donnie et al 2011    Assessment (rationale for performing, application to patient s function & care plan): Re-assessment of gait stability for 10th visit progress. Patient has made detectable change with increase in score from 16 to 20/24, indicating reduced fall risk.     (Minutes billed as physical performance test): 9

## 2022-02-18 ENCOUNTER — OFFICE VISIT (OUTPATIENT)
Dept: GASTROENTEROLOGY | Facility: CLINIC | Age: 52
End: 2022-02-18
Payer: COMMERCIAL

## 2022-02-18 ENCOUNTER — VIRTUAL VISIT (OUTPATIENT)
Dept: OTOLARYNGOLOGY | Facility: CLINIC | Age: 52
End: 2022-02-18
Payer: COMMERCIAL

## 2022-02-18 ENCOUNTER — LAB (OUTPATIENT)
Dept: LAB | Facility: CLINIC | Age: 52
End: 2022-02-18
Attending: INTERNAL MEDICINE

## 2022-02-18 VITALS — BODY MASS INDEX: 39.78 KG/M2 | HEIGHT: 64 IN | WEIGHT: 233 LBS

## 2022-02-18 VITALS
OXYGEN SATURATION: 93 % | DIASTOLIC BLOOD PRESSURE: 76 MMHG | SYSTOLIC BLOOD PRESSURE: 125 MMHG | BODY MASS INDEX: 40.64 KG/M2 | HEART RATE: 90 BPM | WEIGHT: 233.1 LBS

## 2022-02-18 DIAGNOSIS — Z79.899 HIGH RISK MEDICATION USE: ICD-10-CM

## 2022-02-18 DIAGNOSIS — B37.0 THRUSH: ICD-10-CM

## 2022-02-18 DIAGNOSIS — E66.01 MORBID OBESITY (H): ICD-10-CM

## 2022-02-18 DIAGNOSIS — F17.200 SMOKER: ICD-10-CM

## 2022-02-18 DIAGNOSIS — M19.90 INFLAMMATORY ARTHRITIS: ICD-10-CM

## 2022-02-18 DIAGNOSIS — R19.7 DIARRHEA, UNSPECIFIED TYPE: Primary | ICD-10-CM

## 2022-02-18 LAB
ALBUMIN SERPL-MCNC: 3.6 G/DL (ref 3.4–5)
ALT SERPL W P-5'-P-CCNC: 28 U/L (ref 0–50)
AST SERPL W P-5'-P-CCNC: 13 U/L (ref 0–45)
CREAT SERPL-MCNC: 0.99 MG/DL (ref 0.52–1.04)
CRP SERPL-MCNC: 8.9 MG/L (ref 0–8)
ERYTHROCYTE [DISTWIDTH] IN BLOOD BY AUTOMATED COUNT: 15.3 % (ref 10–15)
GFR SERPL CREATININE-BSD FRML MDRD: 69 ML/MIN/1.73M2
HCT VFR BLD AUTO: 38.7 % (ref 35–47)
HGB BLD-MCNC: 12.6 G/DL (ref 11.7–15.7)
MCH RBC QN AUTO: 31.2 PG (ref 26.5–33)
MCHC RBC AUTO-ENTMCNC: 32.6 G/DL (ref 31.5–36.5)
MCV RBC AUTO: 96 FL (ref 78–100)
PLATELET # BLD AUTO: 279 10E3/UL (ref 150–450)
RBC # BLD AUTO: 4.04 10E6/UL (ref 3.8–5.2)
WBC # BLD AUTO: 10.1 10E3/UL (ref 4–11)

## 2022-02-18 PROCEDURE — 84460 ALANINE AMINO (ALT) (SGPT): CPT | Performed by: PATHOLOGY

## 2022-02-18 PROCEDURE — 82040 ASSAY OF SERUM ALBUMIN: CPT | Performed by: PATHOLOGY

## 2022-02-18 PROCEDURE — 36415 COLL VENOUS BLD VENIPUNCTURE: CPT | Performed by: PATHOLOGY

## 2022-02-18 PROCEDURE — 82565 ASSAY OF CREATININE: CPT | Performed by: PATHOLOGY

## 2022-02-18 PROCEDURE — 85027 COMPLETE CBC AUTOMATED: CPT | Performed by: PATHOLOGY

## 2022-02-18 PROCEDURE — 84450 TRANSFERASE (AST) (SGOT): CPT | Performed by: PATHOLOGY

## 2022-02-18 PROCEDURE — 99442 PR PHYSICIAN TELEPHONE EVALUATION 11-20 MIN: CPT | Mod: 95 | Performed by: OTOLARYNGOLOGY

## 2022-02-18 PROCEDURE — 99213 OFFICE O/P EST LOW 20 MIN: CPT | Performed by: INTERNAL MEDICINE

## 2022-02-18 PROCEDURE — 86140 C-REACTIVE PROTEIN: CPT | Performed by: PATHOLOGY

## 2022-02-18 RX ORDER — CLOTRIMAZOLE 10 MG/1
LOZENGE ORAL
Qty: 70 LOZENGE | Refills: 1 | Status: SHIPPED | OUTPATIENT
Start: 2022-02-18 | End: 2022-06-30

## 2022-02-18 RX ORDER — CHOLESTYRAMINE 4 G/9G
POWDER, FOR SUSPENSION ORAL
Qty: 368.76 G | Refills: 11 | Status: SHIPPED | OUTPATIENT
Start: 2022-02-18 | End: 2022-06-30

## 2022-02-18 ASSESSMENT — PAIN SCALES - GENERAL: PAINLEVEL: NO PAIN (0)

## 2022-02-18 NOTE — NURSING NOTE
"Chief Complaint   Patient presents with     RECHECK     6 week follow up       Height 1.626 m (5' 4\"), weight 105.7 kg (233 lb), not currently breastfeeding.    Mabel Obrien, EMT    "

## 2022-02-18 NOTE — PROGRESS NOTES
ASSESSMENT/PLAN:  53 yo woman with morbid obesity (BMI 39), ROBERT, GERD with esophagitis, COPD who presents for follow up of diarrhea.     Chronic diarrhea  Improved but not resolved. Maybe noted some improvement with cholestyramine, which she is interested in continuing. We discussed colonoscopy for microscopic colitis (duloxetine, omeprazole) but she would like to wait on this for now. Enteric pathogen testing was negative.     - start metamucil 1 teaspoon   - you can restart cholestyramine 4 grams once per day at least 2 hours after and 2 hours before food or medications, if metamucil doesn't work  - if you are still having diarrhea we can do a colonoscopy to look for microscopic colitis     Morbid obesity  GERD, ROBERT. Undergoing evaluation for sleeve.     - encouraged weight loss    RTC 6 months    Trent Rahman MD    Division of Gastroenterology, Hepatology and Nutrition  [facility]      HPI  53 yo woman with morbid obesity (BMI 39), ROBERT, GERD with esophagitis, COPD who presents for follow up of diarrhea.    Used the cholestyramine for about a month which seemed to have help. Now has been out of it but has noted continued improvement. Has 2 to 4 loose BM in the morning all clustered but then is good for the day. No blood or melena. She is happy with this.     Previously tried imodium and metamucil for irritable bowel. Takes duloxetine and omeprazole.     ROS:    No fevers or chills  No weight loss  No blurry vision, double vision or change in vision  No sore throat  No lymphadenopathy  No headache, paraesthesias, or weakness in a limb  No shortness of breath or wheezing  No chest pain or pressure  No arthralgias or myalgias  No rashes or skin changes  No odynophagia or dysphagia  No BRBPR, hematochezia, melena  No dysuria, frequency or urgency  No hot/cold intolerance or polyria  No anxiety or depression    PROBLEM LIST  Patient Active Problem List    Diagnosis Date Noted     Class  2 obesity due to excess calories with body mass index (BMI) of 39.0 to 39.9 in adult, unspecified whether serious comorbidity present 08/08/2015     Priority: High     Cervical radiculopathy 09/21/2021     Priority: Medium     Added automatically from request for surgery [id number]       Lumbar radiculopathy, chronic 06/15/2021     Priority: Medium     Added automatically from request for surgery [id number]       Chronic neck pain 05/19/2021     Priority: Medium     Glucose intolerance 04/10/2021     Priority: Medium     Chronic lumbar radiculopathy 03/18/2021     Priority: Medium     Added automatically from request for surgery [id number]       Degenerative lumbar spinal stenosis 08/31/2020     Priority: Medium     Added automatically from request for surgery [id number]       Sacro-iliac pain 06/09/2020     Priority: Medium     Added automatically from request for surgery [id number]       Calculus of gallbladder without cholecystitis without obstruction 01/21/2020     Priority: Medium     Symptomatic cholelithiasis 01/21/2020     Priority: Medium     Inflammatory arthritis 11/27/2019     Priority: Medium     Lumbar spondylosis 11/04/2019     Priority: Medium     Added automatically from request for surgery [id number]       Spinal epidural abscess 08/19/2019     Priority: Medium     Respiratory bronchiolitis interstitial lung disease (H)      Priority: Medium     Subcutaneous emphysema (H) 04/05/2018     Priority: Medium     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) 10/13/2017     Priority: Medium     Oral lesion 02/14/2017     Priority: Medium     Interstitial lung disease (H) 11/17/2016     Priority: Medium     Overview:   Patient sees Pulm       Derangement of temporomandibular joint 11/15/2016     Priority: Medium     Displacement of articular disc of temporomandibular joint with reduction 11/15/2016     Priority: Medium     Arthralgia of temporomandibular joint 09/20/2016     Priority: Medium     Articular disc disorder of  temporomandibular joint 09/20/2016     Priority: Medium     Myofascial pain 09/20/2016     Priority: Medium     Esophageal stricture 07/18/2016     Priority: Medium     Overview:   With Hiatal hernia; on Acid blocker lifelong       Stress 06/15/2016     Priority: Medium     Hypoxia 06/08/2016     Priority: Medium     Onychomycosis 06/01/2016     Priority: Medium     Restless leg syndrome 06/01/2016     Priority: Medium     Smoker 11/02/2015     Priority: Medium     Dental abscess 08/08/2015     Priority: Medium     Facial cellulitis 03/05/2015     Priority: Medium     Chronic midline low back pain 11/10/2014     Priority: Medium     Diagnosis updated by automated process. Provider to review and confirm.       HTN (hypertension) 01/28/2013     Priority: Medium     Borderline personality disorder (H) 01/12/2011     Priority: Medium     GERD (gastroesophageal reflux disease) 03/23/2010     Priority: Medium     Moderate persistent asthma without complication 03/23/2010     Priority: Medium     Overview:   Patient sees Pulm       Seasonal allergies 03/23/2010     Priority: Medium       PERTINENT PAST MEDICAL HISTORY:  Past Medical History:   Diagnosis Date     Allergic rhinitis      Anemia      Arthritis      Asthma     copd     Dental abscess 8-2015     Depressive disorder      Gastroesophageal reflux disease      History of emphysema      Hoarseness      Hypertension      Morbid obesity with BMI of 40.0-44.9, adult (H)      Obstructive sleep apnea      Other chronic pain      Respiratory bronchiolitis interstitial lung disease (H)      Sleep apnea        PREVIOUS SURGERIES:  Past Surgical History:   Procedure Laterality Date     CERVICAL FUSION       COLONOSCOPY       COLONOSCOPY N/A 02/06/2020    Procedure: COLONOSCOPY, WITH POLYPECTOMY AND BIOPSY;  Surgeon: Julian Mccullough MD;  Location:  GI     ENT SURGERY       ESOPHAGOSCOPY, GASTROSCOPY, DUODENOSCOPY (EGD), COMBINED N/A 02/06/2020    Procedure:  ESOPHAGOGASTRODUODENOSCOPY (EGD);  Surgeon: Julian Mccullough MD;  Location: [facility]     EXCISE LESION INTRAORAL Bilateral 10/03/2018    Procedure: EXCISE LESION INTRAORAL;  Wide Local Excision Of of Left Oral Cavity Ulcer;  Surgeon: Morro Mijares MD;  Location: [facility] OR     HC DRAIN SKIN ABSCESS SIMPLE/SINGLE  03/16/2012    Procedure:INCISION AND DRAINAGE, ABSCESS, SIMPLE; Surgeon:CHRISTIANO HANCOCK; Location: GI     HEAD & NECK SURGERY       HYSTERECTOMY       HYSTERECTOMY       INCISION AND DRAINAGE ABDOMEN WASHOUT, COMBINED       INJECT EPIDURAL CERVICAL N/A 10/14/2021    Procedure: Cervical 7- Thoracic 1 epidural steroid injection with fluoroscopy;  Surgeon: Tram Florian MD;  Location: [facility] OR     INJECT EPIDURAL LUMBAR Right 09/15/2020    Procedure: Lumbar5- sacral 1 epidural steroid injection with fluoroscopy;  Surgeon: Tram Florian MD;  [facility]     INJECT EPIDURAL LUMBAR Right 06/29/2021    Procedure: Lumbar 5 sacral 1 epidural steroid injection with fluoroscopy;  Surgeon: Tram Florian MD;  Location: [facility] OR     INJECT SACROILIAC JOINT Bilateral 06/16/2020    Procedure: Bilateral sacroiliac joint steroid injection with fluoroscopy;  Surgeon: Tram Florian MD;  [facility]     LAMINECTOMY THORACIC ONE LEVEL N/A 08/19/2019    Procedure: LAMINECTOMY, SPINE, THORACIC, 11-12 and Part of Lumbar 1, DRAINAGE OF EPIDURAL ABCESS, Epidural Drain Placement X 2;  Surgeon: Yadiel Beal MD;  Location: [facility] OR     SD PERCUT IMPLNT NEUROELECT,EPIDURAL N/A 08/08/2019    Procedure: TRIAL, SPINAL CORD STIMULATOR WITH [employer];  Surgeon: Sipple, Daniel Peter, DO;  Location: [facility];  Service: Pain     RADIO FREQUENCY ABLATION / DESTRUCTION OF SACROILOAC JOINT DORSAL PRIMARY RAMUS Bilateral 12/17/2019    Procedure: Bilateral lumbar radiofrequency ablation with fluoroscopy and intravenous sedation ( Lumbar 2,3,4,5 medial branch nerves for the bilateral lumbar3-4, 4-5  and 5-sacral1 joints.;  Surgeon: Tram Florian MD;  [facility]     RADIO FREQUENCY ABLATION / DESTRUCTION OF SACROILOAC JOINT DORSAL PRIMARY RAMUS Right 11/17/2020    Procedure: Right lumbar medial branch nerve radiofrequency ablation right L2,3,4,5 nerves supplying the right L3-4, L4-5 and L5-S1 facet joints;  Surgeon: Tram Florian MD;  Location: [facility] OR     spinal cord stimulator  08/08/2019     spinal cord stimulator removal  08/13/2019       PREVIOUS ENDOSCOPY:  Colonoscopy 2020 with 1 TA    ALLERGIES:     Allergies   Allergen Reactions     Bee Venom Anaphylaxis     Bees      Doxycycline Anaphylaxis     Patient thinks it may have been just nausea and vomiting, however unable to confirm     Erythromycin Anaphylaxis and Shortness Of Breath     Other reaction(s): Vomiting     Hydrocodone-Acetaminophen Itching       PERTINENT MEDICATIONS:    Current Outpatient Medications:      albuterol (PROAIR HFA, PROVENTIL HFA, VENTOLIN HFA) 108 (90 BASE) MCG/ACT inhaler, Inhale 2 puffs into the lungs every 6 hours as needed for shortness of breath / dyspnea or wheezing (PT last dose 1.23.2020) , Disp: , Rfl:      busPIRone HCl (BUSPAR) 30 MG tablet, Take 30 mg by mouth 2 times daily , Disp: , Rfl:      carboxymethylcellulose (CARBOXYMETHYLCELLULOSE SODIUM) 0.5 % SOLN ophthalmic solution, Place 1 drop into both eyes 3 times daily as needed for dry eyes, Disp: 15 mL, Rfl: 11     celecoxib (CELEBREX) 200 MG capsule, Take 1 capsule (200 mg) by mouth every morning, Disp: 60 capsule, Rfl: 4     cetirizine (ZYRTEC) 10 MG tablet, Take 1 tablet (10 mg) by mouth daily, Disp: 69 tablet, Rfl: 3     cholecalciferol ( ULTRA STRENGTH) 2000 units CAPS, Take 2,000 Units by mouth every evening , Disp: , Rfl:      cyclobenzaprine (FLEXERIL) 10 MG tablet, Take 1 tablet (10 mg) by mouth At Bedtime, Disp: 90 tablet, Rfl: 1     DULoxetine (CYMBALTA) 60 MG capsule, Take 120 mg by mouth At Bedtime , Disp: , Rfl:      EPINEPHrine  (EPIPEN/ADRENACLICK/OR ANY BX GENERIC EQUIV) 0.3 MG/0.3ML injection 2-pack, INJECT 0.3ML INTO THE MUSCLE ONCE AS NEEDED FOR ANAPHYLAXIS, Disp: , Rfl:      folic acid (FOLVITE) 1 MG tablet, Take 1 tablet (1 mg) by mouth daily, Disp: 90 tablet, Rfl: 3     methotrexate sodium 2.5 MG TABS, Take 9 tablets (22.5 mg) by mouth once a week, Disp: 108 tablet, Rfl: 2     montelukast (SINGULAIR) 10 MG tablet, Take 10 mg by mouth At Bedtime, Disp: , Rfl:      nystatin (MYCOSTATIN) 429796 UNIT/GM external cream, Apply topically 2 times daily (Patient taking differently: Apply topically daily as needed ), Disp: 30 g, Rfl: 3     omeprazole (PRILOSEC) 40 MG DR capsule, Take 1 capsule (40 mg) by mouth daily (Patient taking differently: Take 40 mg by mouth every evening ), Disp: 180 capsule, Rfl: 3     OXYGEN-HELIUM IN, 2-3L PRN during the day, 3L @ night  Oxygen only not helium, Disp: , Rfl:      predniSONE (DELTASONE) 5 MG tablet, Take 4 tablets daily for 7 days, then 3 tablets daily for 7 days, then 2 tablets daily for 7 days., Disp: 60 tablet, Rfl: 1     pregabalin (LYRICA) 150 MG capsule, Take 1 capsule (150 mg) by mouth 2 times daily, Disp: 60 capsule, Rfl: 0     Respiratory Therapy Supplies ([facility] CPAP FILTER) MISC, , Disp: , Rfl:      rOPINIRole (REQUIP) 0.5 MG tablet, Take 1 tablet (0.5 mg) by mouth At Bedtime Take 1 tab by mouth at bedtime., Disp: 90 tablet, Rfl: 1     triamcinolone (KENALOG) 0.1 % external ointment, Apply topically 2 times daily (Patient not taking: Reported on 1/17/2022), Disp: 15 g, Rfl: 1     vitamin C 500 MG TABS, Take 500 mg by mouth daily (with lunch) , Disp: , Rfl:      zinc sulfate (ZINCATE) 220 (50 Zn) MG capsule, Take 220 mg by mouth daily (with lunch) , Disp: , Rfl:     Current Facility-Administered Medications:      ropivacaine (NAROPIN) injection 1 mL, 1 mL, , , Gael Dumont MD, 1 mL at 03/29/21 1424     triamcinolone (KENALOG-40) injection 40 mg, 40 mg, , , Gael Dumont  MD Jeffrey, 40 mg at 21 1424    Facility-Administered Medications Ordered in Other Visits:      Lidocaine 1 % injection 0.5-5 mL, 0.5-5 mL, Other, Once PRN, Gutierrez Candelario MD     sodium chloride (PF) 0.9% PF flush 5-50 mL, 5-50 mL, Intracatheter, Once PRN, Gutierrez Candelario MD    SOCIAL HISTORY:  Social History     Socioeconomic History     Marital status: Single     Spouse name: Not on file     Number of children: Not on file     Years of education: Not on file     Highest education level: Not on file   Occupational History     Not on file   Tobacco Use     Smoking status: Former Smoker     Packs/day: 1.00     Years: 30.00     Pack years: 30.00     Types: Cigarettes     Start date: 1996     Quit date: 2021     Years since quittin.2     Smokeless tobacco: Never Used   Substance and Sexual Activity     Alcohol use: No     Drug use: Not Currently     Types: Marijuana     Comment: very rarely      Sexual activity: Never   Other Topics Concern     Parent/sibling w/ CABG, MI or angioplasty before 65F 55M? Not Asked   Social History Narrative     Not on file     Social Determinants of Health     Financial Resource Strain: Not on file   Food Insecurity: Not on file   Transportation Needs: Not on file   Physical Activity: Not on file   Stress: Not on file   Social Connections: Not on file   Intimate Partner Violence: Not on file   Housing Stability: Not on file       FAMILY HISTORY:  Family History   Problem Relation Age of Onset     Asthma Mother      Restless Leg Syndrome Mother      Other Cancer Father         base of tongue cancer at age ~65     Hypertension Father      Back Pain Father      Restless Leg Syndrome Father      Coronary Artery Disease Father      Restless Leg Syndrome Sister      Breast Cancer Maternal Aunt 55     Anesthesia Reaction No family hx of      Deep Vein Thrombosis (DVT) No family hx of        Past/family/social history reviewed and no changes    PHYSICAL  EXAMINATION:  Constitutional: aaox3, cooperative, pleasant, not dyspneic/diaphoretic, no acute distress  Vitals reviewed: LMP  (LMP Unknown)   Wt:   Wt Readings from Last 2 Encounters:   01/18/22 103 kg (227 lb)   12/06/21 101.2 kg (223 lb)      Eyes: Sclera anicteric/injected  Ears/nose/mouth/throat: mucus membranes moist, hearing intact  Neck: supple  CV: No edema  Respiratory: Unlabored breathing  Lymph: No cervical lymphadenopathy  Abd: Nondistended, +bs, no hepatosplenomegaly, nontender, no peritoneal signs  Skin: warm, perfused, no jaundice  Psych: Normal affect  MSK: Normal gait      PERTINENT STUDIES:    Orders Only on 11/10/2021   Component Date Value Ref Range Status     WBC Count 11/10/2021 7.5  4.0 - 11.0 10e3/uL Final     RBC Count 11/10/2021 3.94  3.80 - 5.20 10e6/uL Final     Hemoglobin 11/10/2021 12.5  11.7 - 15.7 g/dL Final     Hematocrit 11/10/2021 39.8  35.0 - 47.0 % Final     MCV 11/10/2021 101 (A) 78 - 100 fL Final     MCH 11/10/2021 31.7  26.5 - 33.0 pg Final     MCHC 11/10/2021 31.4 (A) 31.5 - 36.5 g/dL Final     RDW 11/10/2021 15.1 (A) 10.0 - 15.0 % Final     Platelet Count 11/10/2021 326  150 - 450 10e3/uL Final     AST 11/10/2021 11  0 - 45 U/L Final     ALT 11/10/2021 19  0 - 50 U/L Final     Albumin 11/10/2021 3.1 (A) 3.4 - 5.0 g/dL Final     Creatinine 11/10/2021 0.99  0.52 - 1.04 mg/dL Final     GFR Estimate 11/10/2021 67  >60 mL/min/1.73m2 Final     CRP Inflammation 11/10/2021 24.0 (A) 0.0 - 8.0 mg/L Final     C Difficile Toxin B by PCR 11/26/2021 Negative  Negative Final     Campylobacter group 11/26/2021 Not Detected  Not Detected Final     Salmonella species 11/26/2021 Not Detected  Not Detected Final     Shigella species 11/26/2021 Not Detected  Not Detected Final     Vibrio group 11/26/2021 Not Detected  Not Detected Final     Rotavirus 11/26/2021 Not Detected  Not Detected Final     Shiga toxin 1 gene 11/26/2021 Not Detected  Not Detected Final     Shiga toxin 2 gene 11/26/2021  Not Detected  Not Detected Final     Norovirus I and II 11/26/2021 Not Detected  Not Detected Final     Yersinia enterocolitica 11/26/2021 Not Detected  Not Detected Final       Imaging:  No recent imaging.

## 2022-02-18 NOTE — LETTER
2/18/2022       RE: Zayra Ramirez  [id number] [place] [address]     Dear Colleague,    Thank you for referring your patient, Zayra Ramirez, to the [facility] [place] [facility]. Please see a copy of my visit note below.    I spoke with Zayra Ramirez on a phone call today.  We looked over her old chart.  We looked at her methotrexate.  She is going to have to be more methotrexate.  She has resolved with her oral cavity lesions.  In addition to this, she is going to [place] for almost a month.  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  We ordered everything for her today, so it will be sent to her pharmacy today.  In addition to that, because it had increased dose of her methotrexate, I am going to put her down for probably about six weeks from now just in case things get worse than they have been in the past.  She understands and agrees and was grateful for the extra appointment.  I will see her at that point in time.     20 minutes total time with chart and patient.       Morro Mijares MD

## 2022-02-18 NOTE — LETTER
2/18/2022         RE: Zayra Ramirez  [address] [place] [address]        Dear Colleague,    Thank you for referring your patient, Zayra Ramirez, to the [facility] [place]. Please see a copy of my visit note below.    ASSESSMENT/PLAN:  53 yo woman with morbid obesity (BMI 39), ROBERT, GERD with esophagitis, COPD who presents for follow up of diarrhea.     Chronic diarrhea  Improved but not resolved. Maybe noted some improvement with cholestyramine, which she is interested in continuing. We discussed colonoscopy for microscopic colitis (duloxetine, omeprazole) but she would like to wait on this for now. Enteric pathogen testing was negative.     - start metamucil 1 teaspoon   - you can restart cholestyramine 4 grams once per day at least 2 hours after and 2 hours before food or medications, if metamucil doesn't work  - if you are still having diarrhea we can do a colonoscopy to look for microscopic colitis     Morbid obesity  GERD, ROBERT. Undergoing evaluation for sleeve.     - encouraged weight loss    RTC 6 months    Trent Rahman MD    Division of Gastroenterology, Hepatology and Nutrition  [facility]      HPI  53 yo woman with morbid obesity (BMI 39), ROBERT, GERD with esophagitis, COPD who presents for follow up of diarrhea.    Used the cholestyramine for about a month which seemed to have help. Now has been out of it but has noted continued improvement. Has 2 to 4 loose BM in the morning all clustered but then is good for the day. No blood or melena. She is happy with this.     Previously tried imodium and metamucil for irritable bowel. Takes duloxetine and omeprazole.     ROS:    No fevers or chills  No weight loss  No blurry vision, double vision or change in vision  No sore throat  No lymphadenopathy  No headache, paraesthesias, or weakness in a limb  No shortness of breath or wheezing  No chest pain or pressure  No arthralgias  or myalgias  No rashes or skin changes  No odynophagia or dysphagia  No BRBPR, hematochezia, melena  No dysuria, frequency or urgency  No hot/cold intolerance or polyria  No anxiety or depression    PROBLEM LIST  Patient Active Problem List    Diagnosis Date Noted     Class 2 obesity due to excess calories with body mass index (BMI) of 39.0 to 39.9 in adult, unspecified whether serious comorbidity present 08/08/2015     Priority: High     Cervical radiculopathy 09/21/2021     Priority: Medium     Added automatically from request for surgery [id number]       Lumbar radiculopathy, chronic 06/15/2021     Priority: Medium     Added automatically from request for surgery [id number]       Chronic neck pain 05/19/2021     Priority: Medium     Glucose intolerance 04/10/2021     Priority: Medium     Chronic lumbar radiculopathy 03/18/2021     Priority: Medium     Added automatically from request for surgery [id number]       Degenerative lumbar spinal stenosis 08/31/2020     Priority: Medium     Added automatically from request for surgery [id number]       Sacro-iliac pain 06/09/2020     Priority: Medium     Added automatically from request for surgery [id number]       Calculus of gallbladder without cholecystitis without obstruction 01/21/2020     Priority: Medium     Symptomatic cholelithiasis 01/21/2020     Priority: Medium     Inflammatory arthritis 11/27/2019     Priority: Medium     Lumbar spondylosis 11/04/2019     Priority: Medium     Added automatically from request for surgery [id number]       Spinal epidural abscess 08/19/2019     Priority: Medium     Respiratory bronchiolitis interstitial lung disease (H)      Priority: Medium     Subcutaneous emphysema (H) 04/05/2018     Priority: Medium     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) 10/13/2017     Priority: Medium     Oral lesion 02/14/2017     Priority: Medium     Interstitial lung disease (H) 11/17/2016     Priority: Medium     Overview:   Patient sees Pulm        Derangement of temporomandibular joint 11/15/2016     Priority: Medium     Displacement of articular disc of temporomandibular joint with reduction 11/15/2016     Priority: Medium     Arthralgia of temporomandibular joint 09/20/2016     Priority: Medium     Articular disc disorder of temporomandibular joint 09/20/2016     Priority: Medium     Myofascial pain 09/20/2016     Priority: Medium     Esophageal stricture 07/18/2016     Priority: Medium     Overview:   With Hiatal hernia; on Acid blocker lifelong       Stress 06/15/2016     Priority: Medium     Hypoxia 06/08/2016     Priority: Medium     Onychomycosis 06/01/2016     Priority: Medium     Restless leg syndrome 06/01/2016     Priority: Medium     Smoker 11/02/2015     Priority: Medium     Dental abscess 08/08/2015     Priority: Medium     Facial cellulitis 03/05/2015     Priority: Medium     Chronic midline low back pain 11/10/2014     Priority: Medium     Diagnosis updated by automated process. Provider to review and confirm.       HTN (hypertension) 01/28/2013     Priority: Medium     Borderline personality disorder (H) 01/12/2011     Priority: Medium     GERD (gastroesophageal reflux disease) 03/23/2010     Priority: Medium     Moderate persistent asthma without complication 03/23/2010     Priority: Medium     Overview:   Patient sees Pulm       Seasonal allergies 03/23/2010     Priority: Medium       PERTINENT PAST MEDICAL HISTORY:  Past Medical History:   Diagnosis Date     Allergic rhinitis      Anemia      Arthritis      Asthma     copd     Dental abscess 8-2015     Depressive disorder      Gastroesophageal reflux disease      History of emphysema      Hoarseness      Hypertension      Morbid obesity with BMI of 40.0-44.9, adult (H)      Obstructive sleep apnea      Other chronic pain      Respiratory bronchiolitis interstitial lung disease (H)      Sleep apnea        PREVIOUS SURGERIES:  Past Surgical History:   Procedure Laterality Date     CERVICAL  FUSION       COLONOSCOPY       COLONOSCOPY N/A 02/06/2020    Procedure: COLONOSCOPY, WITH POLYPECTOMY AND BIOPSY;  Surgeon: Julian Mccullough MD;  Location:  GI     ENT SURGERY       ESOPHAGOSCOPY, GASTROSCOPY, DUODENOSCOPY (EGD), COMBINED N/A 02/06/2020    Procedure: ESOPHAGOGASTRODUODENOSCOPY (EGD);  Surgeon: Julian Mccullough MD;  Location:  GI     EXCISE LESION INTRAORAL Bilateral 10/03/2018    Procedure: EXCISE LESION INTRAORAL;  Wide Local Excision Of of Left Oral Cavity Ulcer;  Surgeon: Morro Mijares MD;  Location: [facility] OR     HC DRAIN SKIN ABSCESS SIMPLE/SINGLE  03/16/2012    Procedure:INCISION AND DRAINAGE, ABSCESS, SIMPLE; Surgeon:CHRISTIANO HANCOCK; Location: GI     HEAD & NECK SURGERY       HYSTERECTOMY       HYSTERECTOMY       INCISION AND DRAINAGE ABDOMEN WASHOUT, COMBINED       INJECT EPIDURAL CERVICAL N/A 10/14/2021    Procedure: Cervical 7- Thoracic 1 epidural steroid injection with fluoroscopy;  Surgeon: Tram Florian MD;  Location: [facility] OR     INJECT EPIDURAL LUMBAR Right 09/15/2020    Procedure: Lumbar5- sacral 1 epidural steroid injection with fluoroscopy;  Surgeon: Tram Florian MD;  [facility]     INJECT EPIDURAL LUMBAR Right 06/29/2021    Procedure: Lumbar 5 sacral 1 epidural steroid injection with fluoroscopy;  Surgeon: Tram Florian MD;  Location: [facility] OR     INJECT SACROILIAC JOINT Bilateral 06/16/2020    Procedure: Bilateral sacroiliac joint steroid injection with fluoroscopy;  Surgeon: Tram Florian MD;  [facility]     LAMINECTOMY THORACIC ONE LEVEL N/A 08/19/2019    Procedure: LAMINECTOMY, SPINE, THORACIC, 11-12 and Part of Lumbar 1, DRAINAGE OF EPIDURAL ABCESS, Epidural Drain Placement X 2;  Surgeon: Yadiel Beal MD;  [facility]     NC PERCUT IMPLNT NEUROELECT,EPIDURAL N/A 08/08/2019    Procedure: TRIAL, SPINAL CORD STIMULATOR WITH [employer];  Surgeon: Sipple, Daniel Peter, DO;  Location: [facility];  Service:  Pain     RADIO FREQUENCY ABLATION / DESTRUCTION OF SACROILOAC JOINT DORSAL PRIMARY RAMUS Bilateral 12/17/2019    Procedure: Bilateral lumbar radiofrequency ablation with fluoroscopy and intravenous sedation ( Lumbar 2,3,4,5 medial branch nerves for the bilateral lumbar3-4, 4-5 and 5-sacral1 joints.;  Surgeon: Tram Florian MD;  [facility]     RADIO FREQUENCY ABLATION / DESTRUCTION OF SACROILOAC JOINT DORSAL PRIMARY RAMUS Right 11/17/2020    Procedure: Right lumbar medial branch nerve radiofrequency ablation right L2,3,4,5 nerves supplying the right L3-4, L4-5 and L5-S1 facet joints;  Surgeon: Tram Florian MD;  Location: [facility] OR     spinal cord stimulator  08/08/2019     spinal cord stimulator removal  08/13/2019       PREVIOUS ENDOSCOPY:  Colonoscopy 2020 with 1 TA    ALLERGIES:     Allergies   Allergen Reactions     Bee Venom Anaphylaxis     Bees      Doxycycline Anaphylaxis     Patient thinks it may have been just nausea and vomiting, however unable to confirm     Erythromycin Anaphylaxis and Shortness Of Breath     Other reaction(s): Vomiting     Hydrocodone-Acetaminophen Itching       PERTINENT MEDICATIONS:    Current Outpatient Medications:      albuterol (PROAIR HFA, PROVENTIL HFA, VENTOLIN HFA) 108 (90 BASE) MCG/ACT inhaler, Inhale 2 puffs into the lungs every 6 hours as needed for shortness of breath / dyspnea or wheezing (PT last dose 1.23.2020) , Disp: , Rfl:      busPIRone HCl (BUSPAR) 30 MG tablet, Take 30 mg by mouth 2 times daily , Disp: , Rfl:      carboxymethylcellulose (CARBOXYMETHYLCELLULOSE SODIUM) 0.5 % SOLN ophthalmic solution, Place 1 drop into both eyes 3 times daily as needed for dry eyes, Disp: 15 mL, Rfl: 11     celecoxib (CELEBREX) 200 MG capsule, Take 1 capsule (200 mg) by mouth every morning, Disp: 60 capsule, Rfl: 4     cetirizine (ZYRTEC) 10 MG tablet, Take 1 tablet (10 mg) by mouth daily, Disp: 69 tablet, Rfl: 3     cholecalciferol ( ULTRA STRENGTH) 2000 units CAPS, Take  2,000 Units by mouth every evening , Disp: , Rfl:      cyclobenzaprine (FLEXERIL) 10 MG tablet, Take 1 tablet (10 mg) by mouth At Bedtime, Disp: 90 tablet, Rfl: 1     DULoxetine (CYMBALTA) 60 MG capsule, Take 120 mg by mouth At Bedtime , Disp: , Rfl:      EPINEPHrine (EPIPEN/ADRENACLICK/OR ANY BX GENERIC EQUIV) 0.3 MG/0.3ML injection 2-pack, INJECT 0.3ML INTO THE MUSCLE ONCE AS NEEDED FOR ANAPHYLAXIS, Disp: , Rfl:      folic acid (FOLVITE) 1 MG tablet, Take 1 tablet (1 mg) by mouth daily, Disp: 90 tablet, Rfl: 3     methotrexate sodium 2.5 MG TABS, Take 9 tablets (22.5 mg) by mouth once a week, Disp: 108 tablet, Rfl: 2     montelukast (SINGULAIR) 10 MG tablet, Take 10 mg by mouth At Bedtime, Disp: , Rfl:      nystatin (MYCOSTATIN) 621974 UNIT/GM external cream, Apply topically 2 times daily (Patient taking differently: Apply topically daily as needed ), Disp: 30 g, Rfl: 3     omeprazole (PRILOSEC) 40 MG DR capsule, Take 1 capsule (40 mg) by mouth daily (Patient taking differently: Take 40 mg by mouth every evening ), Disp: 180 capsule, Rfl: 3     OXYGEN-HELIUM IN, 2-3L PRN during the day, 3L @ night  Oxygen only not helium, Disp: , Rfl:      predniSONE (DELTASONE) 5 MG tablet, Take 4 tablets daily for 7 days, then 3 tablets daily for 7 days, then 2 tablets daily for 7 days., Disp: 60 tablet, Rfl: 1     pregabalin (LYRICA) 150 MG capsule, Take 1 capsule (150 mg) by mouth 2 times daily, Disp: 60 capsule, Rfl: 0     Respiratory Therapy Supplies (CARETOUCH UNIVERSL CPAP FILTER) MISC, , Disp: , Rfl:      rOPINIRole (REQUIP) 0.5 MG tablet, Take 1 tablet (0.5 mg) by mouth At Bedtime Take 1 tab by mouth at bedtime., Disp: 90 tablet, Rfl: 1     triamcinolone (KENALOG) 0.1 % external ointment, Apply topically 2 times daily (Patient not taking: Reported on 1/17/2022), Disp: 15 g, Rfl: 1     vitamin C 500 MG TABS, Take 500 mg by mouth daily (with lunch) , Disp: , Rfl:      zinc sulfate (ZINCATE) 220 (50 Zn) MG capsule, Take 220  mg by mouth daily (with lunch) , Disp: , Rfl:     Current Facility-Administered Medications:      ropivacaine (NAROPIN) injection 1 mL, 1 mL, , , Gael Dumont MD, 1 mL at 21 1424     triamcinolone (KENALOG-40) injection 40 mg, 40 mg, , , Gael Dumont MD, 40 mg at 21 1424    Facility-Administered Medications Ordered in Other Visits:      Lidocaine 1 % injection 0.5-5 mL, 0.5-5 mL, Other, Once PRN, Gutierrez Candelario MD     sodium chloride (PF) 0.9% PF flush 5-50 mL, 5-50 mL, Intracatheter, Once PRN, Gutierrez Candelario MD    SOCIAL HISTORY:  Social History     Socioeconomic History     Marital status: Single     Spouse name: Not on file     Number of children: Not on file     Years of education: Not on file     Highest education level: Not on file   Occupational History     Not on file   Tobacco Use     Smoking status: Former Smoker     Packs/day: 1.00     Years: 30.00     Pack years: 30.00     Types: Cigarettes     Start date: 1996     Quit date: 2021     Years since quittin.2     Smokeless tobacco: Never Used   Substance and Sexual Activity     Alcohol use: No     Drug use: Not Currently     Types: Marijuana     Comment: very rarely      Sexual activity: Never   Other Topics Concern     Parent/sibling w/ CABG, MI or angioplasty before 65F 55M? Not Asked   Social History Narrative     Not on file     Social Determinants of Health     Financial Resource Strain: Not on file   Food Insecurity: Not on file   Transportation Needs: Not on file   Physical Activity: Not on file   Stress: Not on file   Social Connections: Not on file   Intimate Partner Violence: Not on file   Housing Stability: Not on file       FAMILY HISTORY:  Family History   Problem Relation Age of Onset     Asthma Mother      Restless Leg Syndrome Mother      Other Cancer Father         base of tongue cancer at age ~65     Hypertension Father      Back Pain Father      Restless Leg Syndrome Father      Coronary  Artery Disease Father      Restless Leg Syndrome Sister      Breast Cancer Maternal Aunt 55     Anesthesia Reaction No family hx of      Deep Vein Thrombosis (DVT) No family hx of        Past/family/social history reviewed and no changes    PHYSICAL EXAMINATION:  Constitutional: aaox3, cooperative, pleasant, not dyspneic/diaphoretic, no acute distress  Vitals reviewed: LMP  (LMP Unknown)   Wt:   Wt Readings from Last 2 Encounters:   01/18/22 103 kg (227 lb)   12/06/21 101.2 kg (223 lb)      Eyes: Sclera anicteric/injected  Ears/nose/mouth/throat: mucus membranes moist, hearing intact  Neck: supple  CV: No edema  Respiratory: Unlabored breathing  Lymph: No cervical lymphadenopathy  Abd: Nondistended, +bs, no hepatosplenomegaly, nontender, no peritoneal signs  Skin: warm, perfused, no jaundice  Psych: Normal affect  MSK: Normal gait      PERTINENT STUDIES:    Orders Only on 11/10/2021   Component Date Value Ref Range Status     WBC Count 11/10/2021 7.5  4.0 - 11.0 10e3/uL Final     RBC Count 11/10/2021 3.94  3.80 - 5.20 10e6/uL Final     Hemoglobin 11/10/2021 12.5  11.7 - 15.7 g/dL Final     Hematocrit 11/10/2021 39.8  35.0 - 47.0 % Final     MCV 11/10/2021 101 (A) 78 - 100 fL Final     MCH 11/10/2021 31.7  26.5 - 33.0 pg Final     MCHC 11/10/2021 31.4 (A) 31.5 - 36.5 g/dL Final     RDW 11/10/2021 15.1 (A) 10.0 - 15.0 % Final     Platelet Count 11/10/2021 326  150 - 450 10e3/uL Final     AST 11/10/2021 11  0 - 45 U/L Final     ALT 11/10/2021 19  0 - 50 U/L Final     Albumin 11/10/2021 3.1 (A) 3.4 - 5.0 g/dL Final     Creatinine 11/10/2021 0.99  0.52 - 1.04 mg/dL Final     GFR Estimate 11/10/2021 67  >60 mL/min/1.73m2 Final     CRP Inflammation 11/10/2021 24.0 (A) 0.0 - 8.0 mg/L Final     C Difficile Toxin B by PCR 11/26/2021 Negative  Negative Final     Campylobacter group 11/26/2021 Not Detected  Not Detected Final     Salmonella species 11/26/2021 Not Detected  Not Detected Final     Shigella species 11/26/2021 Not  Detected  Not Detected Final     Vibrio group 11/26/2021 Not Detected  Not Detected Final     Rotavirus 11/26/2021 Not Detected  Not Detected Final     Shiga toxin 1 gene 11/26/2021 Not Detected  Not Detected Final     Shiga toxin 2 gene 11/26/2021 Not Detected  Not Detected Final     Norovirus I and II 11/26/2021 Not Detected  Not Detected Final     Yersinia enterocolitica 11/26/2021 Not Detected  Not Detected Final       Imaging:  No recent imaging.      Again, thank you for allowing me to participate in the care of your patient.      Sincerely,    Trent Rahman MD

## 2022-02-18 NOTE — NURSING NOTE
Chief Complaint   Patient presents with     Follow Up       Vitals:    02/18/22 0803   BP: 125/76   Pulse: 90   SpO2: 93%   Weight: 105.7 kg (233 lb 1.6 oz)       Body mass index is 40.64 kg/m .    Zainab Diaz CMA

## 2022-02-18 NOTE — PROGRESS NOTES
I spoke with Zayra Hoytr on a phone call today.  We looked over her old chart.  We looked at her methotrexate.  She is going to have to be more methotrexate.  She has resolved with her oral cavity lesions.  In addition to this, she is going to [place] for almost a month.  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  We ordered everything for her today, so it will be sent to her pharmacy today.  In addition to that, because it had increased dose of her methotrexate, I am going to put her down for probably about six weeks from now just in case things get worse than they have been in the past.  She understands and agrees and was grateful for the extra appointment.  I will see her at that point in time.     20 minutes total time with chart and patient.     Morro Mijares MD

## 2022-02-18 NOTE — PATIENT INSTRUCTIONS
It was a pleasure taking care of you today.  I've included a brief summary of our discussion and care plan from today's visit below.  Please review this information with your primary care provider.  _______________________________________________________________________    My recommendations are summarized as follows:  - start Met    Please call our nurse Elda with any questions or concerns- [phone].  --    Return to GI Clinic in 6 months to review your progress.    _______________________________________________________________________    Who do I call with any questions after my visit?  Please be in touch if there are any further questions that arise following today's visit.  There are multiple ways to contact your gastroenterology care team.        During business hours, you may reach a Gastroenterology nurse at [phone] and choose option 3.         To schedule or reschedule an appointment, please call [phone].       You can always send a secure message through "InvierteMe,SL".  "InvierteMe,SL" messages are answered by your nurse or doctor typically within 24 hours.  Please allow extra time on weekends and holidays.        For urgent/emergent questions after business hours, you may reach the on-call GI Fellow by contacting the [facility]  at [phone].     How will I get the results of any tests ordered?    You will receive all of your results.  If you have signed up for "InvierteMe,SL", any tests ordered at your visit will be available to you after your physician reviews them.  Typically this takes 1-2 weeks.  If there are urgent results that require a change in your care plan, your physician or nurse will call you to discuss the next steps.      What is "InvierteMe,SL"?  "InvierteMe,SL" is a secure way for you to access all of your healthcare records from the [facility].  It is a web based computer program, so you can sign on to it from any location.  It also allows you to send secure messages to your  care team.  I recommend signing up for Melon access if you have not already done so and are comfortable with using a computer.      How to I schedule a follow-up visit?  If you did not schedule a follow-up visit today, please call [phone] to schedule a follow-up office visit.        Sincerely,    Trent Rahman MD     [facility]  Division of Gastroenterology

## 2022-02-20 DIAGNOSIS — J30.2 SEASONAL ALLERGIC RHINITIS, UNSPECIFIED TRIGGER: ICD-10-CM

## 2022-02-22 RX ORDER — CETIRIZINE HYDROCHLORIDE 10 MG/1
10 TABLET ORAL DAILY
Qty: 90 TABLET | Refills: 2 | Status: SHIPPED | OUTPATIENT
Start: 2022-02-22 | End: 2022-09-06

## 2022-02-24 ENCOUNTER — MYC MEDICAL ADVICE (OUTPATIENT)
Dept: SLEEP MEDICINE | Facility: CLINIC | Age: 52
End: 2022-02-24
Payer: COMMERCIAL

## 2022-02-24 DIAGNOSIS — M19.90 INFLAMMATORY ARTHRITIS: Primary | ICD-10-CM

## 2022-02-28 DIAGNOSIS — M19.90 INFLAMMATORY ARTHRITIS: ICD-10-CM

## 2022-03-01 RX ORDER — METHYLPREDNISOLONE 4 MG/1
TABLET ORAL
Qty: 60 TABLET | Refills: 1 | Status: ON HOLD | OUTPATIENT
Start: 2022-03-01 | End: 2022-07-14

## 2022-03-01 NOTE — TELEPHONE ENCOUNTER
Placed call to patient who states she is taking 10 mg prednisone daily and has two days left before she is done with a 21 day taper. Morning stiffness now last 45 minutes, prior to prednisone stiffness lasted three hours. Patient states pain is mainly in her shoulders and hands now, with minimal swelling in hands. Patient confirms she increased methotrexate to 25 mg weekly in late January per instruction from Dr. Saenz.  Yandy Erickson RN  Adult Rheumatology Clinic

## 2022-03-02 NOTE — TELEPHONE ENCOUNTER
PREDNISONE 5 MG TABLET  Last Written Prescription Date:   1/22/2022  Last Fill Quantity: 60,   # refills: 1  Last Office Visit :  12/17/2021  Future Office visit:   7/15/2022  Routing refill request to provider for review/approval because:  Called and left a message for the Pt to call clinic back to see what dose she is taking at the present time..    Refer to clinic for further review       Lucie Ambriz RN  Central Triage Red Flags/Med Refills

## 2022-03-09 RX ORDER — PREDNISONE 5 MG/1
TABLET ORAL
OUTPATIENT
Start: 2022-03-09

## 2022-03-09 NOTE — TELEPHONE ENCOUNTER
Per telephone encounter 3/1/22, Dr. Saenz has prescribed Medrol dose pack to replace prednisone. Refill declined.  Yandy Erickson RN  Adult Rheumatology Clinic

## 2022-03-25 DIAGNOSIS — G25.81 RESTLESS LEG SYNDROME: ICD-10-CM

## 2022-03-25 RX ORDER — PREGABALIN 150 MG/1
150 CAPSULE ORAL 2 TIMES DAILY
Qty: 60 CAPSULE | Refills: 0 | Status: SHIPPED | OUTPATIENT
Start: 2022-03-25 | End: 2022-04-04

## 2022-04-01 DIAGNOSIS — G06.1 SPINAL EPIDURAL ABSCESS: ICD-10-CM

## 2022-04-01 ASSESSMENT — SLEEP AND FATIGUE QUESTIONNAIRES
HOW LIKELY ARE YOU TO NOD OFF OR FALL ASLEEP WHEN YOU ARE A PASSENGER IN A CAR FOR AN HOUR WITHOUT A BREAK: HIGH CHANCE OF DOZING
HOW LIKELY ARE YOU TO NOD OFF OR FALL ASLEEP IN A CAR, WHILE STOPPED FOR A FEW MINUTES IN TRAFFIC: WOULD NEVER DOZE
HOW LIKELY ARE YOU TO NOD OFF OR FALL ASLEEP WHILE LYING DOWN TO REST IN THE AFTERNOON WHEN CIRCUMSTANCES PERMIT: HIGH CHANCE OF DOZING
HOW LIKELY ARE YOU TO NOD OFF OR FALL ASLEEP WHILE SITTING INACTIVE IN A PUBLIC PLACE: SLIGHT CHANCE OF DOZING
HOW LIKELY ARE YOU TO NOD OFF OR FALL ASLEEP WHILE SITTING QUIETLY AFTER LUNCH WITHOUT ALCOHOL: MODERATE CHANCE OF DOZING
HOW LIKELY ARE YOU TO NOD OFF OR FALL ASLEEP WHILE SITTING AND READING: MODERATE CHANCE OF DOZING
HOW LIKELY ARE YOU TO NOD OFF OR FALL ASLEEP WHILE SITTING AND TALKING TO SOMEONE: MODERATE CHANCE OF DOZING
HOW LIKELY ARE YOU TO NOD OFF OR FALL ASLEEP WHILE WATCHING TV: MODERATE CHANCE OF DOZING

## 2022-04-03 NOTE — PROGRESS NOTES
CPAP Follow-Up Visit:    Date on this visit: 4/4/2022    Zayra Ramirez has a follow-up visit today to review her medication management for restless legs syndrome.  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    Regarding ROBERT: She is on CPAP 14 cm with 3 lpm O2. She follows with Dr. Bonilla at [facility] yearly for her sleep disordered breathing.     Her sleep has been alright, somewhat disrupted by pain (shoulders, knees and ankles from RA). She has had a little more restlessness lately, although last night was better. Sometimes she feels she needs an extra ropinirole. She has not noticed any side effects from the medication.     For RLS: 150 mg pregabalin in the afternoon and at bedtime and 0.5 mg ropinirole at bedtime  Her ferritin was 149 in 9/2020. She had been on an iron supplement but she does not take it regularly as it is not well tolerated.       Weight change since sleep study: 233 lbs today. 248# in 9/2020.    Bedtime: 8-9 PM.  Wake time: 7 AM. She tends to her dogs. If she is in pain, she will lay down until she can function again. Falls asleep in 30 minutes, hard to get comfortable. Wakes on and off all night. Reason for waking: pain. This has been worse since December. She is working with her rheumatologist and has had some improvement, but it is still causing a lot of discomfort.  Naps: almost every day for 1-2 hours in the morning if in pain.       Past medical/surgical history, family history, social history, medications and allergies were reviewed.      Problem List:  Patient Active Problem List    Diagnosis Date Noted     Class 2 obesity due to excess calories with body mass index (BMI) of  39.0 to 39.9 in adult, unspecified whether serious comorbidity present 08/08/2015     Priority: High     Morbid obesity (H) 02/18/2022     Priority: Medium     Cervical radiculopathy 09/21/2021     Priority: Medium     Added automatically from request for surgery [id number]       Lumbar radiculopathy, chronic 06/15/2021     Priority: Medium     Added automatically from request for surgery [id number]       Chronic neck pain 05/19/2021     Priority: Medium     Glucose intolerance 04/10/2021     Priority: Medium     Chronic lumbar radiculopathy 03/18/2021     Priority: Medium     Added automatically from request for surgery [id number]       Degenerative lumbar spinal stenosis 08/31/2020     Priority: Medium     Added automatically from request for surgery [id number]       Sacro-iliac pain 06/09/2020     Priority: Medium     Added automatically from request for surgery [id number]       Calculus of gallbladder without cholecystitis without obstruction 01/21/2020     Priority: Medium     Symptomatic cholelithiasis 01/21/2020     Priority: Medium     Inflammatory arthritis 11/27/2019     Priority: Medium     Lumbar spondylosis 11/04/2019     Priority: Medium     Added automatically from request for surgery [id number]       Spinal epidural abscess 08/19/2019     Priority: Medium     Respiratory bronchiolitis interstitial lung disease (H)      Priority: Medium     Subcutaneous emphysema (H) 04/05/2018     Priority: Medium     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) 10/13/2017     Priority: Medium     Oral lesion 02/14/2017     Priority: Medium     Interstitial lung disease (H) 11/17/2016     Priority: Medium     Overview:   Patient sees Pulm       Derangement of temporomandibular joint 11/15/2016     Priority: Medium     Displacement of articular disc of temporomandibular joint with reduction 11/15/2016     Priority: Medium     Arthralgia of temporomandibular joint 09/20/2016     Priority: Medium     Articular disc disorder of  temporomandibular joint 09/20/2016     Priority: Medium     Myofascial pain 09/20/2016     Priority: Medium     Esophageal stricture 07/18/2016     Priority: Medium     Overview:   With Hiatal hernia; on Acid blocker lifelong       Stress 06/15/2016     Priority: Medium     Hypoxia 06/08/2016     Priority: Medium     Onychomycosis 06/01/2016     Priority: Medium     Restless leg syndrome 06/01/2016     Priority: Medium     Smoker 11/02/2015     Priority: Medium     Dental abscess 08/08/2015     Priority: Medium     Facial cellulitis 03/05/2015     Priority: Medium     Chronic midline low back pain 11/10/2014     Priority: Medium     Diagnosis updated by automated process. Provider to review and confirm.       HTN (hypertension) 01/28/2013     Priority: Medium     Borderline personality disorder (H) 01/12/2011     Priority: Medium     GERD (gastroesophageal reflux disease) 03/23/2010     Priority: Medium     Moderate persistent asthma without complication 03/23/2010     Priority: Medium     Overview:   Patient sees Pulm       Seasonal allergies 03/23/2010     Priority: Medium        Impression/Plan:    (G25.81) Restless leg syndrome  (primary encounter diagnosis), (Z86.2) History of anemia  Comment: Zayra has had slight worsening of RLS lately, likely secondary to either increased pain or possibly low iron. She has been advised to take an iron supplement but does not tolerate it well. Ferritin was 149 in 9/2020, but could be artificially elevated as an acute phase reactant.   Plan: rOPINIRole (REQUIP) 0.5 MG tablet,  pregabalin (LYRICA) 150 MG capsule (refilled with a start date of 4/24 as it was last prescribed on 3/25.  Iron and Iron Binding Capacity        Continue 150 mg pregabalin in the afternoon and again at bedtime, and continue 0.5 mg ropinirole in the evening. We talked about keeping the ropinirole to a minimum to avoid augmentation. I will recheck an iron binding panel to see if she should indeed  continue an iron supplement. If so, perhaps she would tolerate Vitron C every other day or every 3rd day.    (G47.01) Insomnia due to medical condition  Comment: Zayra has had more trouble sleeping lately due to increased pain. She feels she is in and out of sleep all night. She has increased her sleep opportunity as a result, which could be perpetuating her sleep difficulty. She is in bed 8-9 PM to 7 AM and most days naps another 1-2 hours after letting her dogs out.   Plan: We talked about reducing time in bed/increasing her duration of wakefulness in the daytime to increase her sleep drive at night. Continue recommendations from rheumatology regarding RA management.          She will follow up with me in about 1 year(s).     40 minutes were spent on the date of the encounter doing chart review, history and exam, documentation and further activities as noted above.     Bennett Goltz, PA-C    CC: No ref. provider found

## 2022-04-04 ENCOUNTER — VIRTUAL VISIT (OUTPATIENT)
Dept: SLEEP MEDICINE | Facility: CLINIC | Age: 52
End: 2022-04-04
Payer: COMMERCIAL

## 2022-04-04 VITALS — BODY MASS INDEX: 39.78 KG/M2 | HEIGHT: 64 IN | WEIGHT: 233 LBS

## 2022-04-04 DIAGNOSIS — G47.01 INSOMNIA DUE TO MEDICAL CONDITION: ICD-10-CM

## 2022-04-04 DIAGNOSIS — G25.81 RESTLESS LEG SYNDROME: ICD-10-CM

## 2022-04-04 DIAGNOSIS — G25.81 RESTLESS LEG SYNDROME: Primary | ICD-10-CM

## 2022-04-04 DIAGNOSIS — Z86.2 HISTORY OF ANEMIA: ICD-10-CM

## 2022-04-04 PROCEDURE — 99215 OFFICE O/P EST HI 40 MIN: CPT | Mod: 95 | Performed by: PHYSICIAN ASSISTANT

## 2022-04-04 RX ORDER — ROPINIROLE 0.5 MG/1
0.5 TABLET, FILM COATED ORAL AT BEDTIME
Qty: 90 TABLET | Refills: 1 | Status: SHIPPED | OUTPATIENT
Start: 2022-04-12 | End: 2022-09-29

## 2022-04-04 RX ORDER — PREGABALIN 150 MG/1
150 CAPSULE ORAL 2 TIMES DAILY
Qty: 60 CAPSULE | Refills: 3 | Status: SHIPPED | OUTPATIENT
Start: 2022-04-24 | End: 2022-09-26

## 2022-04-04 RX ORDER — ROPINIROLE 0.5 MG/1
0.5 TABLET, FILM COATED ORAL AT BEDTIME
Qty: 90 TABLET | Refills: 1 | Status: CANCELLED | OUTPATIENT
Start: 2022-04-04

## 2022-04-04 NOTE — PROGRESS NOTES
Zayra is a 52 year old who is being evaluated via a billable video visit.      How would you like to obtain your AVS? MyChart  If the video visit is dropped, the invitation should be resent by: Text to cell phone: [phone]  Will anyone else be joining your video visit? No      Video Start Time: 1:29 PM  Video-Visit Details    Type of service:  Video Visit    Video End Time:1:57 PM    Originating Location (pt. Location): Home    Distant Location (provider location):  [facility]     Platform used for Video Visit: [email or web address]

## 2022-04-05 RX ORDER — CYCLOBENZAPRINE HCL 10 MG
TABLET ORAL
Qty: 90 TABLET | Refills: 1 | OUTPATIENT
Start: 2022-04-05

## 2022-04-10 ENCOUNTER — HEALTH MAINTENANCE LETTER (OUTPATIENT)
Age: 52
End: 2022-04-10

## 2022-05-01 DIAGNOSIS — M19.90 INFLAMMATORY ARTHRITIS: ICD-10-CM

## 2022-05-04 NOTE — TELEPHONE ENCOUNTER
METHYLPREDNISOLONE 4 MG TABLET      Sig: TAKE 3 TABS BY MOUTH DAILY FOR 10 DAYS, THEN   2 TABS DAILY FOR 10 DAYS, THEN   1 TAB DAILY FOR 10 DAYS  Last Written Prescription Date:  3-1-22  Last Fill Quantity: 60,   # refills: 1  Last Office Visit : 12-17-21  Future Office visit:  7-15-22      last clinic note:12-17-21  4.  To address inflammatory arthritis flare, start prednisone 20 mg daily for 7 days,   then 15 mg daily for 7 days, then 10 mg daily for 7 days.    If joint pain symptoms improved during prednisone, but flare again after prednisone is done,   let rheumatology know; change in methotrexate dose may be warranted.    SEE RF NOTE 2-28-22:-- Medrol dpse pack to replace prednisone.   [phone]    Routing refill request to provider for review/approval because:  Medication not on protocol  Message left on pt voice mail- return call to clinic  Are you requesting RF?  # of meds left?  Daily dose and day of taper?

## 2022-06-02 ENCOUNTER — OFFICE VISIT (OUTPATIENT)
Dept: RHEUMATOLOGY | Facility: CLINIC | Age: 52
End: 2022-06-02
Payer: COMMERCIAL

## 2022-06-02 VITALS
WEIGHT: 228.1 LBS | HEIGHT: 64 IN | BODY MASS INDEX: 38.94 KG/M2 | OXYGEN SATURATION: 95 % | HEART RATE: 76 BPM | DIASTOLIC BLOOD PRESSURE: 72 MMHG | SYSTOLIC BLOOD PRESSURE: 114 MMHG

## 2022-06-02 DIAGNOSIS — G89.29 CHRONIC LEFT SHOULDER PAIN: Primary | ICD-10-CM

## 2022-06-02 DIAGNOSIS — M25.512 CHRONIC LEFT SHOULDER PAIN: Primary | ICD-10-CM

## 2022-06-02 DIAGNOSIS — Z87.39 HISTORY OF SERONEGATIVE INFLAMMATORY ARTHRITIS: ICD-10-CM

## 2022-06-02 PROCEDURE — 99214 OFFICE O/P EST MOD 30 MIN: CPT | Performed by: INTERNAL MEDICINE

## 2022-06-02 RX ORDER — OXYCODONE AND ACETAMINOPHEN 5; 325 MG/1; MG/1
TABLET ORAL
Qty: 42 TABLET | Refills: 0 | Status: SHIPPED | OUTPATIENT
Start: 2022-06-02 | End: 2022-06-13

## 2022-06-02 RX ORDER — FOLIC ACID 1 MG/1
1 TABLET ORAL DAILY
Qty: 90 TABLET | Refills: 3 | Status: SHIPPED | OUTPATIENT
Start: 2022-06-02 | End: 2022-07-29

## 2022-06-02 RX ORDER — METHOTREXATE 2.5 MG/1
22.5 TABLET ORAL WEEKLY
Qty: 108 TABLET | Refills: 2 | Status: SHIPPED | OUTPATIENT
Start: 2022-06-02 | End: 2022-07-29

## 2022-06-02 ASSESSMENT — PAIN SCALES - GENERAL: PAINLEVEL: MODERATE PAIN (5)

## 2022-06-02 NOTE — PATIENT INSTRUCTIONS
"Diagnosis:  1.  Inflammatory arthritis: Small joint symptoms are improved compared to pre-methotrexate. I recommend continuing on methotrexate at current dose  2.  Left shoulder pain and reduced motion: Left shoulder pain persists; there is a contribution from rotator cuff tendonitis. I recommend physical therapy, pain medication, and if no improvement in a month, injection above the left shoulder  3.  Neck and low back pain: Continue work with Dr. Wilhelm.    Plan:  1.  Continue methotrexate 9 tablets (22.5 mg) once weekly.While on methotrexate:   -- Check labs every 3 months (AST/ALT, Albumin, CBC with platelets)   -- Limit alcohol intake to 2 drinks weekly; use folate 1 mg daily.  --Tylenol 500-1000 mg can be used as needed up to three times daily for nausea/headache associated with dosing.  2.  Return to Physical therapy for instruction in daily \"cane raising\" and wall walking exercises to maintain and expand left shoulder range of motion.  3.  Repeat monitoring blood work in June 2022  4.  Complete current medrol dose pack: avoid using prednisone or medrol more often than once per 2-3 months.  5. Schedule \"Injection only\" visit to clinic at [facility] [place] [facility] for subacromial injection  6.  Percocet tablets up to 3 tablets daily for shoulder pain until June 16.  Pain pills are short-term solution, and cannot be refilled.  7.  Reduce vitamin D intake to 1000 mcg daily.  "

## 2022-06-02 NOTE — PROGRESS NOTES
"[facility]  Rheumatology Clinic  Panchito Saenz MD  2022     Name: Zayra Ramirez  MRN: [id number]  Age: 52 year old  : 1970  Referring provider: Aime Mason    Assessment and Plan:  # Seronegative inflammatory arthritis:  Inflammatory arthritis is likely flaring, with increased daily joint pain in shoulder, hips, knees >> small joints of the hands and feet and prolonged morning stiffness. Exam shows  shoulder abnormalities, see below, but no inflammatory changes in knuckles fingers toes or mid feet. Lab work on 2022 showed creatinine, transaminases, and CBC all normal.  CRP was elevated at 8.9, value was markedly decreased compared with 2021.     Although hip shoulder and knee pain is worse in recent weeks, and although morning stiffness in these joints persists for several hours, former small joint predominant arthritis symptoms are improved since starting methotrexate.  I recommend continuing methotrexate at current doses for now while undertaking treatment for local condition such as rotator cuff tendinitis.  Combination therapy with anti-TNF may also be warranted in future     # Impingement, left > R shoulder:  Left shoulder range of motion is restricted again, with physical exam remarkable for painful active forward flexion, abduction, and resisted external rotation on the left more than right.  I recommended resuming physical therapy with emphasis on range of motion exercises and a home exercise program including  \"cane raising\" exercises to maintain flexion range of motion on the left.  I also offered a 2-week course of oxycodone/acetaminophen tablets maximum 3 tablets daily.  I emphasized the short-term and nonremovable nature of the prescription.    # Tobacco abuse:   Patient quit smoking in early  completely.    #Degenerative arthritis, neck and lumbar spine: I appreciate Dr. Wilhelm's evaluation, and agree with offered lumbar spine corrective surgery after " "patient has satisfied weight reduction requirements.  She is already quit smoking per Dr. Wilhelm's recommendation.    Follow-up: Return in about 1 month for L subacromial injection only visit.    Plan:  1.  Continue methotrexate 9 tablets (22.5 mg) once weekly.While on methotrexate:   -- Check labs every 3 months (AST/ALT, Albumin, CBC with platelets)   -- Limit alcohol intake to 2 drinks weekly; use folate 1 mg daily.  --Tylenol 500-1000 mg can be used as needed up to three times daily for nausea/headache associated with dosing.  2.  Return to Physical therapy for instruction in daily \"cane raising\" and wall walking exercises to maintain and expand left shoulder range of motion.  3.  Repeat monitoring blood work in June 2022  4.  Complete current medrol dose pack: avoid using prednisone or medrol more often than once per 2-3 months.  5. [place] \"[place]\" [place] [facility] [place]  6.  Percocet tablets up to 3 tablets daily for shoulder pain until June 16.  Pain pills are short-term solution, and cannot be refilled.  7.  Reduce vitamin D intake to 1000 mcg daily.      HPI:   Zayra Ramirez follows up for seronegative inflammatory arthritis and L shoulder pain.  She was last seen in , when seronegative inflammatory arthritis was worse. Recommendation was to continue methotrexate monotherapy at 22.5 mg once weekly and use a 3 week course of prednisone.    Interval history June 2, 2022    She still has waxing/waning pain in both shoulders, knees, hips. Sieges of pain start in the mornings, \"I can't even get out of bed due to severe shoulder/elbow pain\". Oftentimes it takes an hour to get out of bed, and a total of 2 hours of early morning stiffness is common.  Former pain affecting fingers wrists and toes has lessened somewhat.    Chronic back/neck pain is still a problem. She has seen Dr. Wilhelm, who has discussed low back surgery pending a weight change.     She is still on " "methotrexate 10 tabs weekly (since 2-2022). She does not think it helps.  She has used several course of prendionse and 2 courses of medrol, last dose 2 weeks ago.   She is \"at least 50%\" better during steroid tapers; symptoms come back within 10 days of stopping. Not using ibuprofen.    Interval history 12-    Back and neck pain is \"a mess\". She has beeen consulting with a spine surgeon and has been offered a surgery to address a fractured plate and screw in the neck.     She is awakening now every morning with severe stiffness and pain, \"everywhere\" in legs, ankles, hips. Her fingers are painful in the morning, but not as intense as before she started methotrexate. She remains in bed 1-3 hours every day. Her hands appear more swollen in the mornings  Stretching provides minimal relief. She has tried ibuprofen 800 mg; it gave partial relief; tylenol does not help. No prednisone.     She takes methotrexate 9 tabs on Mondays. No AE. No dosing cycle symptoms.     Review of Systems:   Pertinent items are noted in HPI or as below, remainder of complete ROS is negative.      No recent problems with hearing or vision. No swallowing problems.   No breathing difficulty, shortness of breath, coughing, or wheezing.  No chest pain or palpitations.  No heart burn, indigestion, abdominal pain, nausea, vomiting, diarrhea.  No urination problems, no bloody, cloudy urine, no dysuria.  No numbing, tingling, weakness.  No headaches or confusion.  No rashes. No easy bleeding or bruising.     Active Medications:   Current Outpatient Medications   Medication Sig     albuterol (PROAIR HFA, PROVENTIL HFA, VENTOLIN HFA) 108 (90 BASE) MCG/ACT inhaler Inhale 2 puffs into the lungs every 6 hours as needed for shortness of breath / dyspnea or wheezing (PT last dose 1.23.2020)      busPIRone HCl (BUSPAR) 30 MG tablet Take 30 mg by mouth 2 times daily      carboxymethylcellulose (CARBOXYMETHYLCELLULOSE SODIUM) 0.5 % SOLN ophthalmic " solution Place 1 drop into both eyes 3 times daily as needed for dry eyes     celecoxib (CELEBREX) 200 MG capsule Take 1 capsule (200 mg) by mouth every morning     cetirizine (ZYRTEC) 10 MG tablet Take 1 tablet (10 mg) by mouth daily     cholecalciferol 50 MCG (2000 UT) CAPS Take 2,000 Units by mouth every evening **20,000-30,000 units daily started taking this doseage approx 5/18/22**     clotrimazole (MYCELEX) 10 MG lozenge Allow 1 nakul to dissolve slowly in mouth 5 times daily for 14 days     cyclobenzaprine (FLEXERIL) 10 MG tablet Take 1 tablet (10 mg) by mouth At Bedtime     DULoxetine (CYMBALTA) 60 MG capsule Take 120 mg by mouth At Bedtime      folic acid (FOLVITE) 1 MG tablet Take 1 tablet (1 mg) by mouth daily     methotrexate sodium 2.5 MG TABS Take 9 tablets (22.5 mg) by mouth once a week     montelukast (SINGULAIR) 10 MG tablet Take 10 mg by mouth At Bedtime     omeprazole (PRILOSEC) 40 MG DR capsule Take 1 capsule (40 mg) by mouth daily (Patient taking differently: Take 40 mg by mouth every evening)     OXYGEN-HELIUM IN 2-3L PRN during the day, 3L @ night    Oxygen only not helium     pregabalin (LYRICA) 150 MG capsule Take 1 capsule (150 mg) by mouth 2 times daily     Respiratory Therapy Supplies ([facility] CPAP FILTER) MISC      rOPINIRole (REQUIP) 0.5 MG tablet Take 1 tablet (0.5 mg) by mouth At Bedtime Take 1 tab by mouth at bedtime.     vitamin C 500 MG TABS Take 500 mg by mouth daily (with lunch)      zinc sulfate (ZINCATE) 220 (50 Zn) MG capsule Take 220 mg by mouth daily (with lunch)      cholestyramine (QUESTRAN) 4 GM/DOSE powder Take 4 grams once per day at least 2 hours after and at least 2 hours before eating or taking medications. (Patient not taking: Reported on 6/2/2022)     EPINEPHrine (EPIPEN/ADRENACLICK/OR ANY BX GENERIC EQUIV) 0.3 MG/0.3ML injection 2-pack INJECT 0.3ML INTO THE MUSCLE ONCE AS NEEDED FOR ANAPHYLAXIS (Patient not taking: Reported on 6/2/2022)      methylPREDNISolone (MEDROL) 4 MG tablet Take 3 tabs daily for 10 days, then 2 tabs daily for 10 days, then 1 tab daily for 10 days (Patient not taking: Reported on 6/2/2022)     nystatin (MYCOSTATIN) 081326 UNIT/GM external cream Apply topically 2 times daily (Patient not taking: Reported on 6/2/2022)     triamcinolone (KENALOG) 0.1 % external ointment Apply topically 2 times daily (Patient not taking: Reported on 6/2/2022)     Current Facility-Administered Medications   Medication     ropivacaine (NAROPIN) injection 1 mL     triamcinolone (KENALOG-40) injection 40 mg     Facility-Administered Medications Ordered in Other Visits   Medication     Lidocaine 1 % injection 0.5-5 mL     sodium chloride (PF) 0.9% PF flush 5-50 mL           Allergies:   Bee Venom       Bees      Doxycycline       Erythromycin     Hydrocodone-Acetaminophen                 Past Medical History:  Remarkable for moderate, persistent asthma, hypertension, bipolar II disorder, interstitial lung disease, cervical stenosis with myelopathy 2017.       Chronic midline low back pain  Facial cellulitis  Morbid (severe) obesity due to excess calories  Dental abscess  Hypoxia  Subcutaneous emphysema  Borderline personality disorder  Stenosis of cervical spine with myelopathy  Esophageal stricture  Gastroesophageal reflux disease   Hypertension   Interstitial lung disease  Moderate persistent asthma without complication  Onychomycosis  Restless leg syndrome  Seasonal allergies  Smoker  Stress  Respiratory bronchiolitis interstitial lung disease     Past Surgical History:  Remarkable for surgical fusion  Hysterectomy 1997  rectocele and Cystocele surgery  Cholecystectomy     Family History:    The patient's family history includes Asthma in her mother; Back Pain in her father; Hypertension in her father; Other Cancer in her father.    Social History:  The patient reports that she has been smoking cigarettes, around 0.5 packs per day. She started smoking  "about 23 years ago. She has a 30.00 pack-year smoking history. She has never used smokeless tobacco. She reports that she does not drink alcohol or use drugs.   PCP: Adam Del Toro  Marital Status: Single     Physical Exam:   /72 (BP Location: Left arm, Patient Position: Sitting, Cuff Size: Adult Large)   Pulse 76   Ht 1.613 m (5' 3.5\")   Wt 103.5 kg (228 lb 1.6 oz)   LMP  (LMP Unknown)   SpO2 95%   BMI 39.77 kg/m     Wt Readings from Last 4 Encounters:   06/02/22 103.5 kg (228 lb 1.6 oz)   04/04/22 105.7 kg (233 lb)   02/18/22 105.7 kg (233 lb)   02/18/22 105.7 kg (233 lb 1.6 oz)     Constitutional: Well-developed, appearing stated age; cooperative.  Eyes: Normal EOM, PERRLA, vision, conjunctiva, sclera.  ENT: Normal external ears, nose, hearing, lips, teeth, gums, throat. No mucous membrane lesions.  Neck: No mass or thyroid enlargement.  Resp: Breathing unlabored  MS: Shoulders: painful active forward flexion, abduction, and resisted external rotation on the left more than right.Fist formation intact, wrist ROM.  No visible swelling at MCPs or PIPs.  Skin: No nail pitting, alopecia, rash, nodules or lesions.  Neuro: Normal cranial nerves  Psych: Normal judgement, orientation, memory, affect.    Laboratory:   RHEUM RESULTS Latest Ref Rng & Units 3/5/2015 3/6/2015 3/7/2015   ALBUMIN 3.4 - 5.0 g/dL - - -   ALT 0 - 50 U/L - - -   AST 0 - 45 U/L - - -   CREATININE 0.52 - 1.04 mg/dL 0.66 0.78 -   CRP 0.0 - 8.0 mg/L - - -   GFR ESTIMATE, IF BLACK >60 mL/min/[1.73:m2] >90  African American GFR Calc   >90   GFR Calc   -   GFR ESTIMATE >60 mL/min/1.73m2 >90  Non  GFR Calc   81 -   HEMATOCRIT 35.0 - 47.0 % 40.4 40.5 38.4   HEMOGLOBIN 11.7 - 15.7 g/dL 13.5 13.4 12.3   WBC 4.0 - 11.0 10e3/uL 16.3(H) 12.8(H) 8.7   RBC 3.80 - 5.20 10e6/uL 4.46 4.44 4.19   RDW 10.0 - 15.0 % 14.6 14.6 14.4   MCHC 31.5 - 36.5 g/dL 33.4 33.1 32.0   MCV 78 - 100 fL 91 91 92   PLATELET COUNT 150 - 450 " 10e3/uL 315 060 285   ESR 0 - 20 mm/h - - -

## 2022-06-02 NOTE — NURSING NOTE
"Chief Complaint   Patient presents with     RECHECK     History of seronegative inflammatory arthritis +2 more       Vitals:    06/02/22 1332   BP: 114/72   BP Location: Left arm   Patient Position: Sitting   Cuff Size: Adult Large   Pulse: 76   SpO2: 95%   Weight: 103.5 kg (228 lb 1.6 oz)   Height: 1.613 m (5' 3.5\")       Body mass index is 39.77 kg/m .    Leny Leal, ICVF    "

## 2022-06-03 ENCOUNTER — OFFICE VISIT (OUTPATIENT)
Dept: RHEUMATOLOGY | Facility: CLINIC | Age: 52
End: 2022-06-03
Attending: INTERNAL MEDICINE
Payer: COMMERCIAL

## 2022-06-03 VITALS
OXYGEN SATURATION: 94 % | SYSTOLIC BLOOD PRESSURE: 128 MMHG | TEMPERATURE: 98.3 F | HEART RATE: 71 BPM | DIASTOLIC BLOOD PRESSURE: 84 MMHG | WEIGHT: 226.4 LBS | BODY MASS INDEX: 39.48 KG/M2

## 2022-06-03 DIAGNOSIS — M71.9 DISORDER OF BURSAE AND TENDONS IN SHOULDER REGION: Primary | ICD-10-CM

## 2022-06-03 DIAGNOSIS — M67.919 DISORDER OF BURSAE AND TENDONS IN SHOULDER REGION: Primary | ICD-10-CM

## 2022-06-03 PROCEDURE — 20610 DRAIN/INJ JOINT/BURSA W/O US: CPT | Performed by: INTERNAL MEDICINE

## 2022-06-03 PROCEDURE — G0463 HOSPITAL OUTPT CLINIC VISIT: HCPCS | Mod: 25

## 2022-06-03 RX ORDER — LIDOCAINE HYDROCHLORIDE 10 MG/ML
5 INJECTION, SOLUTION EPIDURAL; INFILTRATION; INTRACAUDAL; PERINEURAL ONCE
Status: DISCONTINUED | OUTPATIENT
Start: 2022-06-03 | End: 2022-07-14

## 2022-06-03 RX ORDER — TRIAMCINOLONE ACETONIDE 40 MG/ML
40 INJECTION, SUSPENSION INTRA-ARTICULAR; INTRAMUSCULAR ONCE
Status: DISCONTINUED | OUTPATIENT
Start: 2022-06-03 | End: 2022-07-14

## 2022-06-03 ASSESSMENT — PAIN SCALES - GENERAL: PAINLEVEL: MODERATE PAIN (5)

## 2022-06-03 NOTE — NURSING NOTE
Chief Complaint   Patient presents with     RECHECK     Injection     Blood pressure 128/84, pulse 71, temperature 98.3  F (36.8  C), temperature source Oral, weight 102.7 kg (226 lb 6.4 oz), SpO2 94 %, not currently breastfeeding.    Tone Horton on 6/3/2022 at 9:30 AM

## 2022-06-03 NOTE — PATIENT INSTRUCTIONS
S/p L subacromial injection.    Call for increasing pain/redness/warmth at shoulder.  Go ahead with physical therapy as discussed.

## 2022-06-03 NOTE — PROGRESS NOTES
[facility]  Rheumatology Clinic  Panchito Saenz MD  2022     Name: Zayra Ramirez  MRN: [id number]  Age: 52 year old  : 1970  Referring provider: Aime Mason    Assessment and Plan:     # Impingement, left > R shoulder:  As discussed on 6-2 clinic visit, plan subacromial injection to help with PTx-mediated shoulder pain improvement.      PROCEDURE:  JOINT ASPIRATION/INJECTION    After a discussion of risks, benefits and side effects of procedure, informed patient consent was obtained.  The left shoulder was prepped and draped in the usual clean fashion (sterile not required for this procedure).       INJECTION:  Using 4 cc of 1 % lidocaine mixed with 40 mg of kenalog, the L subacromial space was successfully injected without complication.  Patient did experience some pain relief following injection.    Invasive Procedure Safety Checklist completed by nurse? Yes     RTC as scheduled in 7-, unless shoulder symptoms resolved by that point.    Panchito Saenz MD  Staff Rheumatologist, [facility]

## 2022-06-03 NOTE — LETTER
6/3/2022       RE: Zayra Ramirez  [address] [address] [facility] [address] [facility] [address]     Dear Colleague,    Thank you for referring your patient, Zayra Ramirez, to the [facility] RHEUMATOLOGY CLINIC [place] [facility]. Please see a copy of my visit note below.    [facility]  Rheumatology Clinic  Panchito Saenz MD  2022     Name: Zayra Ramirez  MRN: [id number]  Age: 52 year old  : 1970  Referring provider: Aime Mason    Assessment and Plan:     # Impingement, left > R shoulder:  As discussed on 6-2 clinic visit, plan subacromial injection to help with PTx-mediated shoulder pain improvement.      PROCEDURE:  JOINT ASPIRATION/INJECTION    After a discussion of risks, benefits and side effects of procedure, informed patient consent was obtained.  The left shoulder was prepped and draped in the usual clean fashion (sterile not required for this procedure).       INJECTION:  Using 4 cc of 1 % lidocaine mixed with 40 mg of kenalog, the L subacromial space was successfully injected without complication.  Patient did experience some pain relief following injection.    Invasive Procedure Safety Checklist completed by nurse? Yes     RTC as scheduled in 7-, unless shoulder symptoms resolved by that point.    Panchito Saenz MD  Staff Rheumatologist, [facility]

## 2022-06-10 ENCOUNTER — THERAPY VISIT (OUTPATIENT)
Dept: PHYSICAL THERAPY | Facility: CLINIC | Age: 52
End: 2022-06-10
Attending: INTERNAL MEDICINE
Payer: COMMERCIAL

## 2022-06-10 DIAGNOSIS — M25.512 CHRONIC LEFT SHOULDER PAIN: ICD-10-CM

## 2022-06-10 DIAGNOSIS — G89.29 CHRONIC LEFT SHOULDER PAIN: ICD-10-CM

## 2022-06-10 PROCEDURE — 97110 THERAPEUTIC EXERCISES: CPT | Mod: GP | Performed by: PHYSICAL THERAPIST

## 2022-06-10 PROCEDURE — 97161 PT EVAL LOW COMPLEX 20 MIN: CPT | Mod: GP | Performed by: PHYSICAL THERAPIST

## 2022-06-10 NOTE — PROGRESS NOTES
Physical Therapy Initial Evaluation  Subjective:  The history is provided by the patient.   Patient Health History  Zayra Ramirez being seen for L shoulder pain .     Problem began: 6/2/2022 (MD visit).      Pain is reported as 6/10 on pain scale.  General health as reported by patient is good.  Pertinent medical history includes: asthma, depression, high blood pressure, numbness/tingling, osteoarthritis, overweight, rheumatoid arthritis and sleep disorder/apnea.   Red flags:  Pain at rest/night.   Other medical allergies details: Doxycycline .   Surgeries include:  Orthopedic surgery. Other surgery history details: Back, neck.    Current medications:  Anti-depressants, anti-inflammatory, high blood pressure medication, muscle relaxants and pain medication.    Current occupation is .   Primary job tasks include:  Prolonged standing, pushing/pulling and repetitive tasks.                  Therapist Generated HPI Evaluation  Problem details: Pt reports she has B shoulder pain L > R, got subacromial injection in L side and it has been helpful. Pt did have recent diagnosis of RA. Pain in both shoulders since February. Pt taking two weeks of oxy, MD ordered 4 sessions of PT. Pt is R handed. After injection, L shoulder feeling much better and R shoulder is now more painful than L. Pt is  and needs shoulder strength for work. Pt does have a bad back and is hoping to get back surgery.    .         Type of problem:  Bilateral shoulders.    This is a chronic condition.  Condition occurred with:  Unknown cause.    Patient reports pain:  In the joint and lateral.  Pain is described as sharp and aching and is constant.  Pain radiates to:  Shoulder and cervical. Pain timing: when moving it.  Since onset symptoms are gradually improving (after injection ).  Associated symptoms:  Loss of motion/stiffness. Symptoms are exacerbated by using arm overhead, lying on extremity, using arm at shoulder level, using arm  behind back and lifting  and relieved by NSAID's.      Restrictions due to condition include:  Working in normal job with restrictions.                          Objective:  System              Cervical/Thoracic Evaluation    AROM:  AROM Cervical:    Flexion:            Mod loss  Extension:       Mod loss  Rotation:         Left: mod loss     Right: mod loss  Side Bend:      Left: mod loss     Right:  Mod loss                               Shoulder Evaluation:  ROM:  AROM:    Flexion:  Left:  90    Right:  90  Extension: Left: 10Right: 10  Abduction:  Left: 80   Right:  80                      PROM:  : limited due to muscle guarding.  Flexion:  Left:  100    Right: 100                            Pain: with active movements    Strength:    Flexion: Left:2/5    Pain: +    Right: 2/5      Pain:  +  Extension:  Left: 2/5     Pain:+    Right: 2/5     Pain:+  Abduction:  Left: 3+/5   Pain:-/+    Right: 2+/5      Pain:+    Internal Rotation:  Left:3+/5      Pain:-/+    Right: 3/5      Pain:-/+  External Rotation:   Left:3/5      Pain:+   Right:2/5      Pain:+                Palpation:    Left shoulder tenderness present at:  Supraspinatus; Infraspinatus; Teres Minor; Deltoid; Levator; Upper Trap and Bicipital Groove  Right shoulder tenderness present at: Supraspinatus; Infraspinatus; Teres Minor; Deltoid; Levator; Upper Trap and Bicipital Groove                                     General     ROS    Assessment/Plan:    Patient is a 52 year old female with both sides shoulder complaints.    Patient has the following significant findings with corresponding treatment plan.                Diagnosis 1:  B shoulder pain  Pain -  self management, education and home program  Decreased ROM/flexibility - manual therapy, therapeutic exercise and home program  Decreased joint mobility - manual therapy, therapeutic exercise and home program  Decreased strength - therapeutic exercise, therapeutic activities and home program  Impaired  muscle performance - neuro re-education and home program  Decreased function - therapeutic activities and home program  Impaired posture - neuro re-education and home program      Cumulative Therapy Evaluation is: Low complexity.    Previous and current functional limitations:  (See Goal Flow Sheet for this information)    Short term and Long term goals: (See Goal Flow Sheet for this information)     Communication ability:  Patient appears to be able to clearly communicate and understand verbal and written communication and follow directions correctly.  Treatment Explanation - The following has been discussed with the patient:   RX ordered/plan of care  Anticipated outcomes  Possible risks and side effects  This patient would benefit from PT intervention to resume normal activities.   Rehab potential is good.    Frequency:  1 X week, once daily  Duration:  for 8 weeks  Discharge Plan:  Achieve all LTG.  Independent in home treatment program.  Reach maximal therapeutic benefit.    Please refer to the daily flowsheet for treatment today, total treatment time and time spent performing 1:1 timed codes.

## 2022-06-10 NOTE — PROGRESS NOTES
[facility]    OUTPATIENT Physical Therapy ORTHOPEDIC EVALUATION  PLAN OF TREATMENT FOR OUTPATIENT REHABILITATION  (COMPLETE FOR INITIAL CLAIMS ONLY)  Patient's Last Name, First Name, M.I.  YOB: 1970  Zayra Ramirez    Provider s Name:  [facility]   Medical Record No.  [id number]   Start of Care Date:  06/10/22   Onset Date:   06/02/22 (MD visit)   Type:     _X__PT   ___OT Medical Diagnosis:    Encounter Diagnosis   Name Primary?     Chronic left shoulder pain         Treatment Diagnosis:  B shoulder pain        Goals:     06/10/22 0500   Body Part   Goals listed below are for L shoulder   Goal #1   Goal #1 reaching   Previous Functional Level No restrictions   Current Functional Level Cannot reach ;to shoulder level   Performance level without pain   STG Target Performance Reach ;to shoulder level   Performance level without pain   Rationale for independent personal hygiene   Due date 07/08/22   LTG Target Performance Reach;overhead   Performance Level without pain   Rationale for independent personal hygiene   Due date 08/05/22       Therapy Frequency:  once per week  Predicted Duration of Therapy Intervention:  4 weeks    Idalmis Subramanian PT                 I CERTIFY THE NEED FOR THESE SERVICES FURNISHED UNDER        THIS PLAN OF TREATMENT AND WHILE UNDER MY CARE     (Physician attestation of this document indicates review and certification of the therapy plan).                     Certification Date From:  06/10/22   Certification Date To:  09/07/22    Referring Provider:  Panchito Saenz    Initial Assessment        See Epic Evaluation SOC Date: 06/10/22    Panchito Saenz MD  Staff Rheumatologist, [facility]

## 2022-06-11 DIAGNOSIS — G06.1 SPINAL EPIDURAL ABSCESS: ICD-10-CM

## 2022-06-12 NOTE — TELEPHONE ENCOUNTER
cyclobenzaprine (FLEXERIL) 10 MG tablet  Last Written Prescription Date: 12/27/21  Last Fill Quantity: 90,   # refills: 1  Last Office Visit : 11/2/21  Future Office visit:  6/30/22      Routing refill request to provider for review/approval because: not on protocol

## 2022-06-13 DIAGNOSIS — G89.29 CHRONIC LEFT SHOULDER PAIN: ICD-10-CM

## 2022-06-13 DIAGNOSIS — M25.512 CHRONIC LEFT SHOULDER PAIN: ICD-10-CM

## 2022-06-13 RX ORDER — METHYLPREDNISOLONE 4 MG/1
TABLET ORAL
Qty: 60 TABLET | Refills: 1 | OUTPATIENT
Start: 2022-06-13

## 2022-06-14 ENCOUNTER — THERAPY VISIT (OUTPATIENT)
Dept: PHYSICAL THERAPY | Facility: CLINIC | Age: 52
End: 2022-06-14
Payer: COMMERCIAL

## 2022-06-14 DIAGNOSIS — G89.29 CHRONIC LEFT SHOULDER PAIN: Primary | ICD-10-CM

## 2022-06-14 DIAGNOSIS — M25.512 CHRONIC LEFT SHOULDER PAIN: Primary | ICD-10-CM

## 2022-06-14 PROCEDURE — 97112 NEUROMUSCULAR REEDUCATION: CPT | Mod: GP | Performed by: PHYSICAL THERAPIST

## 2022-06-14 PROCEDURE — 97110 THERAPEUTIC EXERCISES: CPT | Mod: GP | Performed by: PHYSICAL THERAPIST

## 2022-06-14 RX ORDER — CYCLOBENZAPRINE HCL 10 MG
10 TABLET ORAL AT BEDTIME
Qty: 90 TABLET | Refills: 0 | Status: SHIPPED | OUTPATIENT
Start: 2022-06-14 | End: 2022-07-28

## 2022-06-28 ENCOUNTER — VIRTUAL VISIT (OUTPATIENT)
Dept: ORTHOPEDICS | Facility: CLINIC | Age: 52
End: 2022-06-28
Payer: COMMERCIAL

## 2022-06-28 DIAGNOSIS — M47.816 LUMBAR SPONDYLOSIS: Primary | ICD-10-CM

## 2022-06-28 DIAGNOSIS — E66.812 CLASS 2 OBESITY: ICD-10-CM

## 2022-06-28 DIAGNOSIS — M40.35 FLATBACK SYNDROME OF THORACOLUMBAR REGION: ICD-10-CM

## 2022-06-28 PROCEDURE — 99214 OFFICE O/P EST MOD 30 MIN: CPT | Mod: 95 | Performed by: ORTHOPAEDIC SURGERY

## 2022-06-28 NOTE — LETTER
"    6/28/2022         RE: Zayra Hoytr  [address] [place] [address]        Dear Colleague,    Thank you for referring your patient, Zayra Ramirez, to the [facility] ORTHOPEDIC [facility]. Please see a copy of my visit note below.    Spine Surgical Hx:  10/16/2017 - ACDF with plate fixation C3-C5 (2 levels); use of Cornerstone allograft (Dr. Rylan Monique, [facility]).  [Implants: Medtronic Zevo plate].  08/08/2019 - SCS implantation ([place] OR); complicated by epidural abscess MSSA culture positive, treated with drainage/laminectomy and IV antibiotics  80/19/2019 - Laminectomies T11-L1 (T12-L2) ([facility]).    BMD Hx:  11/24/22 - DEXA spine/hip/wrist.  T-score = -1.1 (Left femoral neck).  Imp: Osteopenia.  12/8/32 - Saw Dr. Guerra (PM&R).  P> RTC 6 wks; had not been seen since.      VIRTUAL VISIT:  Patient is evaluated today via billable virtual visit.    The patient has been notified of following:   \"This virtual visit will be conducted via a call between you and your physician/provider. We have found that certain health care needs can be provided without the need for an in-person physical exam.  This service lets us provide the care you need with a virtual conversation.  If a prescription is necessary we can send it directly to your pharmacy.  If lab work is needed we can place an order for that and you can then stop by our lab to have the test done at a later time.  If during the course of the call the physician/provider feels a virtual visit is not appropriate, you will not be charged for this service.\"     Physician has received verbal consent for a Virtual Visit from the patient.  Platform used:  ModusP Video  Time:  1:41pm to 2:01pm  Originating Location (pt. Location): Home  Distant Location (provider location):  [facility] ORTHOPEDIC [facility]     Chief Complaint   Patient presents with     Follow Up     6 month follow up lumbar radiculopathy  "       Last Visit Date: 11/11/21  Previous Impression:  51/f RHD with:  Lumbar spine:  1.  Multilevel lumbar spondylosis with stenosis, L1-S1, with neurogenic claudication.  2.  Spinal sagittal malalignment with thoracolumbar kyphosis (T10-L2= 24 deg kyphosis) and lumbar flatback deformity (LL = 42, ideal 48.5).  3.  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  4.  Chronic active smoking x 35 yrs, currently 1 ppd.  5.  Class II obesity (BMI 39.50).  Cervical spine:  1.  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  2.  S/p ACDF C3-C5 (10/16/2017 Kayden).  Previous Plan:  - Dexa spine/hip/wrist  - CT lumbar and thoracic  - PM&R for pre-habilitation  - Weight management referral  - PAC referral  - Smoking cessation  - Obtain operative report from ACDF with Dr. Monique's   In-person RTC after above, with lumbar flex-ext x-rays.  If lower lumbar levels (L4-S1) revert to normal alignment on flexion x-ray, may perhaps consider shorter fusion without going down to pelvis.  I was initially thinking of T10-pelvis fusion.  However, if with good L4-S1 motion, may potentially stop at L4.      S>  52 year old female, RHD, to review above results.    Per patient, things have not gotten better.  Still really bad.    DEXA scans done in Nov.   CT scans done in Nov.    12/8/21 - Saw Dr. Guerra.  Will place nutrition consult.  F/u in 6 wks.    Per patient, she was unable to make that f/u appointment, as she was dealing with depression flareup.    Per patient, she was not seen at Weight mgmt clinic; they sent her reading materials, but no appointment.    Per patient, she quit smoking after our last visit.  She then gained 10 lbs, then lost those 10 lbs.  Have not weighed herself in a while.  Thus, had been off smoking since Nov 2021.    Currently takes percocet for her shoulder, which she takes when she does PT for her shoulder.  Very sparingly; per patient, her last prescription was only for  4 tabs.    Per patient, her worst pains are her shoulders and her low back.    Finally found a mental health therapist/counselor, they have 1st appointment today at 3pm.  She had been seeing a specialist who prescribes meds.  Currently on  meds.    Doing PT for her shoulders ([facility]).  Had not done PT for her back since Feb 2022.      Previous injections:  1/10/19 - RFA L3-L5 (Dr. Dan Sipple, [facility]).  3/18/19 - R L5-S1 TFESI  12/17/19 - RFA bilateral L3-4, L4-5 and L5-S1 (Dr. Florian).  6/16/20 - Bilateral SIJ injection with steroid (Chiqui)  9/15/20 - L5-S1 IL EDUARDO (Chiqui)  11/17/20 - RFA Right L3-4, L4-5, L5-S1 (Chiqui).   6/29/21 - L5-S1 IL EDUARDO (Chiqui)  10/14/21 - C7-T1 IL EDUARDO (Chiqui)      Oswestry (KATIE) Questionnaire    OSWESTRY DISABILITY INDEX 6/27/2022   Count 10   Sum 32   Oswestry Score (%) 64   Some recent data might be hidden      KATIE 11/8/21 64%  KATIE 6/27/22 64%      Neck Disability Index (NDI) Questionnaire    Neck Disability Index (NDI) 6/27/2022   Neck Disability Index: Count 9   NDI: Total Score = SUM (points for all 10 findings) 23   Neck Disability in Percent = (Total Score) / 50 * 100 51.11 (%)   Some recent data might be hidden      NDI 6/27/22 51.11%      Visual Analog Pain Scale  Back Pain Scale 0-10: (P) 7.5  Right leg pain: (P) 4.5  Left leg pain: (P) 1.5  Neck Pain Scale 0-10: (P) 7.5  Right arm pain: (P) 2.5  Left arm pain: (P) 4.5    PROMIS-10 Scores  Global Mental Health Score: (P) 7  Global Physical Health Score: (P) 9  PROMIS TOTAL - SUBSCORES: (P) 16    Physical Examination:    This was a virtual visit, so very limited exam could be performed.  Patient seemed alert, oriented x 3, cooperative, with coherent speech, and not in distress.  Able to respond to questions appropriately and follow instructions.    Imaging:    Thoracic and lumbar CT scans from 11/24/2021 reviewed.  Shows advanced multilevel lower thoracic and lumbar spondylosis, with vacuum disc signal at all  levels T10 to sacrum, except for T12-L1 which has obliteration of disc space and 4-0 bony ankylosis of anterior column.  At L2-3 and L5-S1, there is calcification of the posterior annulus contributing to canal stenosis.  At L4-5, there is a vacuum signal behind the L4 vertebral body, possibly from a disc herniation extrusion, contributing to stenosis at that level.  There is postlaminectomy change T12-L2.    Assessment:    51/f RHD with:  Lumbar spine:  1.  Multilevel lumbar spondylosis with stenosis, L1-S1, with neurogenic claudication.  2.  Spinal sagittal malalignment with thoracolumbar kyphosis (T10-L2= 24 deg kyphosis) and lumbar flatback deformity (LL = 42, ideal 48.5).  3.  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  4.  S/p laminectomies T11-L1 (8/19/19, Dr. Lian Causey).  5.  Smoking history x 35 yrs, quit in November 2021.  6.  Class II obesity (BMI 39.50).  Cervical spine:  1.  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  2.  S/p ACDF C3-C5 (10/16/2017 Kayden).    Plan:    Had good long discussion with patient.  She continues to struggle with back and bilateral shoulder symptoms.  She is currently undergoing physical therapy for her shoulder; she stopped physical therapy for her back in February.  She is open to doing more physical therapy for her back.  She also has neck problems, but currently not as bad as shoulders or lower back.    At the same time, she is struggling with mental health issues.  She also does not seem to have lost significant weight compared to our last visit.  On a good note, however, she has been off smoking since November, and I congratulated her for this.    Overall, I do not think now is a good time to proceed with big multilevel spinal fusion likely from T10 down to pelvis.  At last visit, I thought about possibly stopping at L4 as SUMIT.  However, CT scan shows advanced spondylosis of the lower levels; thus, if she would need fusion  surgery, it would have to go down to the sacrum/pelvis.    At this point, I recommend that we focus on her mental health issues.  She is seeing a new mental health therapist today.  She is currently on medication.  I also encouraged her to work with the weight management clinic, to work on weight reduction, and try to bring her BMI down to less than 35.  I also encouraged her to go back to formal physical therapy for her low back, which could be addressed in conjunction with her shoulder PT.    - Referral to Weight mgmt clinic.  - PT for low back (currently doing PT for shoulders at [facility]).  -We will try to get her reconnected to PM&R (Dr. Guerra), as she had not been seen since December 2021 clinic visit.  In basket message sent to Dr. Guerra.  - Encouraged to continue with Mental Health therapy.    In person return to clinic in 3 months for reevaluation, with repeat EOS full spine AP lateral standing x-rays and lumbar flexion-extension x-rays.    25 minutes spent on the date of the encounter doing chart review/review of outside records/review of test results/interpretation of tests/patient visit/documentation/discussion with other provider(s)/discussion with patient and family.      Philip Wilhelm MD    Orthopaedic Spine Surgery  Dept Orthopaedic Surgery, [facility]  [phone] office, [phone] pager  [email or web address]

## 2022-06-28 NOTE — PROGRESS NOTES
"Spine Surgical Hx:  10/16/2017 - ACDF with plate fixation C3-C5 (2 levels); use of Cornerstone allograft (Dr. Rylan Monique, [facility]).  [Implants: Medtronic Zevo plate].  08/08/2019 - SCS implantation ([facility]); complicated by epidural abscess MSSA culture positive, treated with drainage/laminectomy and IV antibiotics  80/19/2019 - Laminectomies T11-L1 (T12-L2) ([facility]).    BMD Hx:  11/24/22 - DEXA spine/hip/wrist.  T-score = -1.1 (Left femoral neck).  Imp: Osteopenia.  12/8/32 - Saw Dr. Guerra (PM&R).  P> RTC 6 wks; had not been seen since.      VIRTUAL VISIT:  Patient is evaluated today via billable virtual visit.    The patient has been notified of following:   \"This virtual visit will be conducted via a call between you and your physician/provider. We have found that certain health care needs can be provided without the need for an in-person physical exam.  This service lets us provide the care you need with a virtual conversation.  If a prescription is necessary we can send it directly to your pharmacy.  If lab work is needed we can place an order for that and you can then stop by our lab to have the test done at a later time.  If during the course of the call the physician/provider feels a virtual visit is not appropriate, you will not be charged for this service.\"     Physician has received verbal consent for a Virtual Visit from the patient.  Platform used:  Koiban[employer] Video  Time:  1:41pm to 2:01pm  Originating Location (pt. Location): Home  Distant Location (provider location):  [facility]     Chief Complaint   Patient presents with     Follow Up     6 month follow up lumbar radiculopathy        Last Visit Date: 11/11/21  Previous Impression:  51/f RHD with:  Lumbar spine:  1.  Multilevel lumbar spondylosis with stenosis, L1-S1, with neurogenic claudication.  2.  Spinal sagittal malalignment with thoracolumbar kyphosis (T10-L2= 24 deg kyphosis) and lumbar " flatback deformity (LL = 42, ideal 48.5).  3.  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  4.  Chronic active smoking x 35 yrs, currently 1 ppd.  5.  Class II obesity (BMI 39.50).  Cervical spine:  1.  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  2.  S/p ACDF C3-C5 (10/16/2017 Kayden).  Previous Plan:  - Dexa spine/hip/wrist  - CT lumbar and thoracic  - PM&R for pre-habilitation  - Weight management referral  - PAC referral  - Smoking cessation  - Obtain operative report from ACDF with Dr. Monique's   In-person RTC after above, with lumbar flex-ext x-rays.  If lower lumbar levels (L4-S1) revert to normal alignment on flexion x-ray, may perhaps consider shorter fusion without going down to pelvis.  I was initially thinking of T10-pelvis fusion.  However, if with good L4-S1 motion, may potentially stop at L4.      S>  52 year old female, RHD, to review above results.    Per patient, things have not gotten better.  Still really bad.    DEXA scans done in Nov.   CT scans done in Nov.    12/8/21 - Saw Dr. Guerra.  Will place nutrition consult.  F/u in 6 wks.    Per patient, she was unable to make that f/u appointment, as she was dealing with depression flareup.    Per patient, she was not seen at Weight mgmt clinic; they sent her reading materials, but no appointment.    Per patient, she quit smoking after our last visit.  She then gained 10 lbs, then lost those 10 lbs.  Have not weighed herself in a while.  Thus, had been off smoking since Nov 2021.    Currently takes percocet for her shoulder, which she takes when she does PT for her shoulder.  Very sparingly; per patient, her last prescription was only for 4 tabs.    Per patient, her worst pains are her shoulders and her low back.    Finally found a mental health therapist/counselor, they have 1st appointment today at 3pm.  She had been seeing a specialist who prescribes meds.  Currently on MH meds.    Doing PT for her  shoulders ([facility]).  Had not done PT for her back since Feb 2022.      Previous injections:  1/10/19 - RFA L3-L5 (Dr. Dan Sipple, [facility]).  3/18/19 - R L5-S1 TFESI  12/17/19 - RFA bilateral L3-4, L4-5 and L5-S1 (Dr. Florian).  6/16/20 - Bilateral SIJ injection with steroid (Chiqui)  9/15/20 - L5-S1 IL EDUARDO (Chiqui)  11/17/20 - RFA Right L3-4, L4-5, L5-S1 ([facility]i).   6/29/21 - L5-S1 IL EDUARDO ([facility]i)  10/14/21 - C7-T1 IL EDUARDO ([facility]i)      Oswestry (KATIE) Questionnaire    OSWESTRY DISABILITY INDEX 6/27/2022   Count 10   Sum 32   Oswestry Score (%) 64   Some recent data might be hidden      KATIE 11/8/21 64%  KATIE 6/27/22 64%      Neck Disability Index (NDI) Questionnaire    Neck Disability Index (NDI) 6/27/2022   Neck Disability Index: Count 9   NDI: Total Score = SUM (points for all 10 findings) 23   Neck Disability in Percent = (Total Score) / 50 * 100 51.11 (%)   Some recent data might be hidden      NDI 6/27/22 51.11%      Visual Analog Pain Scale  Back Pain Scale 0-10: (P) 7.5  Right leg pain: (P) 4.5  Left leg pain: (P) 1.5  Neck Pain Scale 0-10: (P) 7.5  Right arm pain: (P) 2.5  Left arm pain: (P) 4.5    PROMIS-10 Scores  Global Mental Health Score: (P) 7  Global Physical Health Score: (P) 9  PROMIS TOTAL - SUBSCORES: (P) 16    Physical Examination:    This was a virtual visit, so very limited exam could be performed.  Patient seemed alert, oriented x 3, cooperative, with coherent speech, and not in distress.  Able to respond to questions appropriately and follow instructions.    Imaging:    Thoracic and lumbar CT scans from 11/24/2021 reviewed.  Shows advanced multilevel lower thoracic and lumbar spondylosis, with vacuum disc signal at all levels T10 to sacrum, except for T12-L1 which has obliteration of disc space and 4-0 bony ankylosis of anterior column.  At L2-3 and L5-S1, there is calcification of the posterior annulus contributing to canal stenosis.  At L4-5, there is a vacuum signal behind the L4  vertebral body, possibly from a disc herniation extrusion, contributing to stenosis at that level.  There is postlaminectomy change T12-L2.    Assessment:    51/f RHD with:  Lumbar spine:  1.  Multilevel lumbar spondylosis with stenosis, L1-S1, with neurogenic claudication.  2.  Spinal sagittal malalignment with thoracolumbar kyphosis (T10-L2= 24 deg kyphosis) and lumbar flatback deformity (LL = 42, ideal 48.5).  3.  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  4.  S/p laminectomies T11-L1 (8/19/19, Dr. Lian Causey).  5.  Smoking history x 35 yrs, quit in November 2021.  6.  Class II obesity (BMI 39.50).  Cervical spine:  1.  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  2.  S/p ACDF C3-C5 (10/16/2017 Kayden).    Plan:    Had good long discussion with patient.  She continues to struggle with back and bilateral shoulder symptoms.  She is currently undergoing physical therapy for her shoulder; she stopped physical therapy for her back in February.  She is open to doing more physical therapy for her back.  She also has neck problems, but currently not as bad as shoulders or lower back.    At the same time, she is struggling with mental health issues.  She also does not seem to have lost significant weight compared to our last visit.  On a good note, however, she has been off smoking since November, and I congratulated her for this.    Overall, I do not think now is a good time to proceed with big multilevel spinal fusion likely from T10 down to pelvis.  At last visit, I thought about possibly stopping at L4 as SUMIT.  However, CT scan shows advanced spondylosis of the lower levels; thus, if she would need fusion surgery, it would have to go down to the sacrum/pelvis.    At this point, I recommend that we focus on her mental health issues.  She is seeing a new mental health therapist today.  She is currently on medication.  I also encouraged her to work with the weight management  clinic, to work on weight reduction, and try to bring her BMI down to less than 35.  I also encouraged her to go back to formal physical therapy for her low back, which could be addressed in conjunction with her shoulder PT.    - Referral to Weight mgmt clinic.  - PT for low back (currently doing PT for shoulders at [facility]).  -We will try to get her reconnected to PM&R (Dr. Guerra), as she had not been seen since December 2021 clinic visit.  In basket message sent to Dr. Guerra.  - Encouraged to continue with Mental Health therapy.    In person return to clinic in 3 months for reevaluation, with repeat EOS full spine AP lateral standing x-rays and lumbar flexion-extension x-rays.    25 minutes spent on the date of the encounter doing chart review/review of outside records/review of test results/interpretation of tests/patient visit/documentation/discussion with other provider(s)/discussion with patient and family.      Philip Wilhelm MD    Orthopaedic Spine Surgery  Dept Orthopaedic Surgery, [facility]  [phone] office, [phone] pager  [email or web address]

## 2022-06-28 NOTE — NURSING NOTE
Medications and allergies verified with patient.    Patient has been in consistent pain since this winter. She struggles with seasonal depression on top of clinical. She was seen by her PCP for a shoulder issue and was prescribed percocet which she stated alleviated the pain. Patient stated she is feeling a sensation of something going down into her lower back and cutting through to her legs. No tingle or numbness in legs. Ongoing about a month.   RANJANA Santiago

## 2022-06-29 ENCOUNTER — THERAPY VISIT (OUTPATIENT)
Dept: PHYSICAL THERAPY | Facility: CLINIC | Age: 52
End: 2022-06-29
Payer: COMMERCIAL

## 2022-06-29 DIAGNOSIS — M25.511 BILATERAL SHOULDER PAIN: Primary | ICD-10-CM

## 2022-06-29 DIAGNOSIS — M25.512 BILATERAL SHOULDER PAIN: Primary | ICD-10-CM

## 2022-06-29 PROCEDURE — 97110 THERAPEUTIC EXERCISES: CPT | Mod: GP

## 2022-06-29 PROCEDURE — 97140 MANUAL THERAPY 1/> REGIONS: CPT | Mod: GP

## 2022-06-29 ASSESSMENT — ENCOUNTER SYMPTOMS
RECTAL PAIN: 0
WHEEZING: 1
BACK PAIN: 1
NECK PAIN: 1
DEPRESSION: 1
ARTHRALGIAS: 1
FLANK PAIN: 0
SINUS CONGESTION: 0
SPUTUM PRODUCTION: 1
JAUNDICE: 0
EYE WATERING: 0
HOARSE VOICE: 1
SINUS PAIN: 0
DYSURIA: 0
DOUBLE VISION: 1
DIFFICULTY URINATING: 1
STIFFNESS: 1
MUSCLE CRAMPS: 1
DYSPNEA ON EXERTION: 1
MYALGIAS: 1
VOMITING: 1
DECREASED CONCENTRATION: 1
EYE PAIN: 1
EYE REDNESS: 0
BLOATING: 1
NAUSEA: 1
MUSCLE WEAKNESS: 1
COUGH: 0
JOINT SWELLING: 1
ABDOMINAL PAIN: 1
BLOOD IN STOOL: 0
BOWEL INCONTINENCE: 0
INSOMNIA: 1
CONSTIPATION: 1
HEMATURIA: 0
POSTURAL DYSPNEA: 1
SHORTNESS OF BREATH: 1
SNORES LOUDLY: 0
NERVOUS/ANXIOUS: 1
PANIC: 1
TROUBLE SWALLOWING: 1
DIARRHEA: 1
HEMOPTYSIS: 0
HEARTBURN: 1
EYE IRRITATION: 1
COUGH DISTURBING SLEEP: 0

## 2022-06-30 ENCOUNTER — OFFICE VISIT (OUTPATIENT)
Dept: INTERNAL MEDICINE | Facility: CLINIC | Age: 52
End: 2022-06-30
Payer: COMMERCIAL

## 2022-06-30 VITALS
WEIGHT: 220.2 LBS | SYSTOLIC BLOOD PRESSURE: 102 MMHG | HEART RATE: 86 BPM | BODY MASS INDEX: 37.59 KG/M2 | DIASTOLIC BLOOD PRESSURE: 69 MMHG | HEIGHT: 64 IN | OXYGEN SATURATION: 94 % | RESPIRATION RATE: 16 BRPM

## 2022-06-30 DIAGNOSIS — Z13.220 SCREENING FOR HYPERLIPIDEMIA: ICD-10-CM

## 2022-06-30 DIAGNOSIS — R73.09 ELEVATED HEMOGLOBIN A1C: ICD-10-CM

## 2022-06-30 DIAGNOSIS — Z12.31 ENCOUNTER FOR SCREENING MAMMOGRAM FOR BREAST CANCER: Primary | ICD-10-CM

## 2022-06-30 DIAGNOSIS — Z13.1 SCREENING FOR DIABETES MELLITUS: ICD-10-CM

## 2022-06-30 DIAGNOSIS — R79.89 ELEVATED SERUM CREATININE: ICD-10-CM

## 2022-06-30 PROCEDURE — 99396 PREV VISIT EST AGE 40-64: CPT | Performed by: NURSE PRACTITIONER

## 2022-06-30 RX ORDER — ARIPIPRAZOLE 5 MG/1
TABLET ORAL
Status: ON HOLD | COMMUNITY
Start: 2022-06-24 | End: 2022-07-14

## 2022-06-30 NOTE — PROGRESS NOTES
S: Zayra Ramirez is a 52 year old female here for her annual physical.    She is experiencing multiple joint pains, mae,shoulders and low back.   She is hoping for spine surgery in Fall 2022. She stopped smoking November 2021.          Patient Active Problem List   Diagnosis     Chronic midline low back pain     Facial cellulitis     Class 2 obesity due to excess calories with body mass index (BMI) of 39.0 to 39.9 in adult, unspecified whether serious comorbidity present     Dental abscess     Hypoxia     Subcutaneous emphysema (H)     Borderline personality disorder (H)     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)     Esophageal stricture     GERD (gastroesophageal reflux disease)     HTN (hypertension)     Interstitial lung disease (H)     Moderate persistent asthma without complication     Onychomycosis     Restless leg syndrome     Seasonal allergies     Smoker     Stress     Respiratory bronchiolitis interstitial lung disease (H)     Bilateral shoulder pain     Spinal epidural abscess     Lumbar spondylosis     Inflammatory arthritis     Arthralgia of temporomandibular joint     Articular disc disorder of temporomandibular joint     Derangement of temporomandibular joint     Calculus of gallbladder without cholecystitis without obstruction     Displacement of articular disc of temporomandibular joint with reduction     Myofascial pain     Oral lesion     Symptomatic cholelithiasis     Sacro-iliac pain     Degenerative lumbar spinal stenosis     Chronic lumbar radiculopathy     Glucose intolerance     Chronic neck pain     Lumbar radiculopathy, chronic     Cervical radiculopathy     Morbid obesity (H)            Past Medical History:   Diagnosis Date     Allergic rhinitis      Anemia      Arthritis      Asthma     copd     Dental abscess 8-2015     Depressive disorder      Gastroesophageal reflux disease      History of emphysema      Hoarseness      Hypertension      Morbid obesity with BMI of  40.0-44.9, adult (H)      Obstructive sleep apnea      Other chronic pain      Respiratory bronchiolitis interstitial lung disease (H)      Sleep apnea             Past Surgical History:   Procedure Laterality Date     CERVICAL FUSION       COLONOSCOPY       COLONOSCOPY N/A 02/06/2020    Procedure: COLONOSCOPY, WITH POLYPECTOMY AND BIOPSY;  Surgeon: Julian Mccullough MD;  Location:  GI     ENT SURGERY       ESOPHAGOSCOPY, GASTROSCOPY, DUODENOSCOPY (EGD), COMBINED N/A 02/06/2020    Procedure: ESOPHAGOGASTRODUODENOSCOPY (EGD);  Surgeon: Julian Mccullough MD;  Location:  GI     EXCISE LESION INTRAORAL Bilateral 10/03/2018    Procedure: EXCISE LESION INTRAORAL;  Wide Local Excision Of of Left Oral Cavity Ulcer;  Surgeon: Morro Mijares MD;  Location: [facility] OR     HC DRAIN SKIN ABSCESS SIMPLE/SINGLE  03/16/2012    Procedure:INCISION AND DRAINAGE, ABSCESS, SIMPLE; Surgeon:CHRISTIANO HANCOCK; Location: GI     HEAD & NECK SURGERY       HYSTERECTOMY       HYSTERECTOMY       INCISION AND DRAINAGE ABDOMEN WASHOUT, COMBINED       INJECT EPIDURAL CERVICAL N/A 10/14/2021    Procedure: Cervical 7- Thoracic 1 epidural steroid injection with fluoroscopy;  Surgeon: Tram Florian MD;  Location: [facility] OR     INJECT EPIDURAL LUMBAR Right 09/15/2020    Procedure: Lumbar5- sacral 1 epidural steroid injection with fluoroscopy;  Surgeon: Tram Florian MD;  [facility]     INJECT EPIDURAL LUMBAR Right 06/29/2021    Procedure: Lumbar 5 sacral 1 epidural steroid injection with fluoroscopy;  Surgeon: Tram Florian MD;  Location: [facility] OR     INJECT SACROILIAC JOINT Bilateral 06/16/2020    Procedure: Bilateral sacroiliac joint steroid injection with fluoroscopy;  Surgeon: Tram Florian MD;  [facility]     LAMINECTOMY THORACIC ONE LEVEL N/A 08/19/2019    Procedure: LAMINECTOMY, SPINE, THORACIC, 11-12 and Part of Lumbar 1, DRAINAGE OF EPIDURAL ABCESS, Epidural Drain Placement X 2;  Surgeon: Yadiel Beal  MD Alexey;  Location: [facility] OR     WA PERCUT IMPLNT NEUROELECT,EPIDURAL N/A 2019    Procedure: TRIAL, SPINAL CORD STIMULATOR WITH [employer];  Surgeon: Sipple, Daniel Peter, DO;  Location: [facility];  Service: Pain     RADIO FREQUENCY ABLATION / DESTRUCTION OF SACROILOAC JOINT DORSAL PRIMARY RAMUS Bilateral 2019    Procedure: Bilateral lumbar radiofrequency ablation with fluoroscopy and intravenous sedation ( Lumbar 2,3,4,5 medial branch nerves for the bilateral lumbar3-4, 4-5 and 5-sacral1 joints.;  Surgeon: Tram Florian MD;  Location:  OR     RADIO FREQUENCY ABLATION / DESTRUCTION OF SACROILOAC JOINT DORSAL PRIMARY RAMUS Right 2020    Procedure: Right lumbar medial branch nerve radiofrequency ablation right L2,3,4,5 nerves supplying the right L3-4, L4-5 and L5-S1 facet joints;  Surgeon: Tram Florian MD;  Location: [facility] OR     spinal cord stimulator  2019     spinal cord stimulator removal  2019            Social History     Tobacco Use     Smoking status: Former Smoker     Packs/day: 1.00     Years: 30.00     Pack years: 30.00     Types: Cigarettes     Start date: 1996     Quit date: 2021     Years since quittin.6     Smokeless tobacco: Never Used   Substance Use Topics     Alcohol use: No            Family History   Problem Relation Age of Onset     Asthma Mother      Restless Leg Syndrome Mother      Other Cancer Father         base of tongue cancer at age ~65     Hypertension Father      Back Pain Father      Restless Leg Syndrome Father      Coronary Artery Disease Father      Restless Leg Syndrome Sister      Breast Cancer Maternal Aunt 55     Anesthesia Reaction No family hx of      Deep Vein Thrombosis (DVT) No family hx of                Allergies   Allergen Reactions     Bee Venom Anaphylaxis     Bees      Doxycycline Anaphylaxis     Patient thinks it may have been just nausea and vomiting, however unable to confirm     Erythromycin  Anaphylaxis and Shortness Of Breath     Other reaction(s): Vomiting     Hydrocodone-Acetaminophen Itching            Current Outpatient Medications   Medication Sig Dispense Refill     albuterol (PROAIR HFA, PROVENTIL HFA, VENTOLIN HFA) 108 (90 BASE) MCG/ACT inhaler Inhale 2 puffs into the lungs every 6 hours as needed for shortness of breath / dyspnea or wheezing (PT last dose 1.23.2020)        busPIRone HCl (BUSPAR) 30 MG tablet Take 30 mg by mouth 2 times daily        carboxymethylcellulose (CARBOXYMETHYLCELLULOSE SODIUM) 0.5 % SOLN ophthalmic solution Place 1 drop into both eyes 3 times daily as needed for dry eyes 15 mL 11     celecoxib (CELEBREX) 200 MG capsule Take 1 capsule (200 mg) by mouth every morning 60 capsule 4     cetirizine (ZYRTEC) 10 MG tablet Take 1 tablet (10 mg) by mouth daily 90 tablet 2     cholecalciferol 50 MCG (2000 UT) CAPS Take 2,000 Units by mouth every evening **20,000-30,000 units daily started taking this doseage approx 5/18/22**       cyclobenzaprine (FLEXERIL) 10 MG tablet Take 1 tablet (10 mg) by mouth At Bedtime please keep appt 6/30/22. 90 tablet 0     DULoxetine (CYMBALTA) 60 MG capsule Take 120 mg by mouth At Bedtime        EPINEPHrine (EPIPEN/ADRENACLICK/OR ANY BX GENERIC EQUIV) 0.3 MG/0.3ML injection 2-pack        folic acid (FOLVITE) 1 MG tablet Take 1 tablet (1 mg) by mouth daily 90 tablet 3     methotrexate sodium 2.5 MG TABS Take 9 tablets (22.5 mg) by mouth once a week 108 tablet 2     methylPREDNISolone (MEDROL) 4 MG tablet Take 3 tabs daily for 10 days, then 2 tabs daily for 10 days, then 1 tab daily for 10 days 60 tablet 1     montelukast (SINGULAIR) 10 MG tablet Take 10 mg by mouth At Bedtime       nystatin (MYCOSTATIN) 733395 UNIT/GM external cream Apply topically 2 times daily 30 g 3     omeprazole (PRILOSEC) 40 MG DR capsule Take 1 capsule (40 mg) by mouth daily (Patient taking differently: Take 40 mg by mouth every evening) 180 capsule 3      oxyCODONE-acetaminophen (PERCOCET) 5-325 MG tablet 1 tab every 8 hours as needed for pain until June 16 2022. Maximum 3 tabs a day for shoulder pain. 42 tablet 0     OXYGEN-HELIUM IN 2-3L PRN during the day, 3L @ night    Oxygen only not helium       pregabalin (LYRICA) 150 MG capsule Take 1 capsule (150 mg) by mouth 2 times daily 60 capsule 3     Respiratory Therapy Supplies (CARETOUCH UNIVERSL CPAP FILTER) MISC        rOPINIRole (REQUIP) 0.5 MG tablet Take 1 tablet (0.5 mg) by mouth At Bedtime Take 1 tab by mouth at bedtime. 90 tablet 1     vitamin C 500 MG TABS Take 500 mg by mouth daily (with lunch)        zinc sulfate (ZINCATE) 220 (50 Zn) MG capsule Take 220 mg by mouth daily (with lunch)          REVIEW OF SYSTEMS:  See above and below.      Answers for HPI/ROS submitted by the patient on 6/29/2022  General Symptoms: No  Skin Symptoms: No  HENT Symptoms: Yes  EYE SYMPTOMS: Yes  HEART SYMPTOMS: No  LUNG SYMPTOMS: Yes  INTESTINAL SYMPTOMS: Yes  URINARY SYMPTOMS: Yes  GYNECOLOGIC SYMPTOMS: No  BREAST SYMPTOMS: No  SKELETAL SYMPTOMS: Yes  BLOOD SYMPTOMS: No  NERVOUS SYSTEM SYMPTOMS: No  MENTAL HEALTH SYMPTOMS: Yes  Ear discharge: Yes  Hearing loss: Yes  Tinnitus: Yes  Nosebleeds: No  Congestion: No  Sinus pain: No  Trouble swallowing: Yes   Voice hoarseness: Yes  Mouth sores: Yes  Dry mouth: Yes  Eye pain: Yes  Vision loss: Yes  Dry eyes: Yes  Watery eyes: No  Eye bulging: No  Double vision: Yes  Flashing of lights: No  Spots: Yes  Floaters: Yes  Redness: No  Crossed eyes: Yes  Tunnel Vision: No  Yellowing of eyes: No  Eye irritation: Yes  Cough: No  Sputum or phlegm: Yes  Coughing up blood: No  Difficulty breating or shortness of breath: Yes  Snoring: No  Wheezing: Yes  Difficulty breathing on exertion: Yes  Nighttime Cough: No  Difficulty breathing when lying flat: Yes  Heart burn or indigestion: Yes  Nausea: Yes  Vomiting: Yes  Abdominal pain: Yes  Bloating: Yes  Constipation: Yes  Diarrhea: Yes  Blood in  "stool: No  Black stools: No  Rectal or Anal pain: No  Fecal incontinence: No  Yellowing of skin or eyes: No  Vomit with blood: No  Change in stools: Yes  Trouble holding urine or incontinence: Yes  Pain or burning: No  Trouble starting or stopping: Yes  Increased frequency of urination: No  Blood in urine: No  Decreased frequency of urination: No  Frequent nighttime urination: Yes  Flank pain: No  Difficulty emptying bladder: Yes  Back pain: Yes  Muscle aches: Yes  Neck pain: Yes  Swollen joints: Yes  Joint pain: Yes  Bone pain: Yes  Muscle cramps: Yes  Muscle weakness: Yes  Joint stiffness: Yes  Bone fracture: No  Nervous or Anxious: Yes  Depression: Yes  Trouble sleeping: Yes  Trouble thinking or concentrating: Yes  Mood changes: Yes  Panic attacks: Yes      O:   /69 (BP Location: Right arm, Patient Position: Sitting, Cuff Size: Adult Large)   Pulse 86   Resp 16   Ht 1.619 m (5' 3.75\")   Wt 99.9 kg (220 lb 3.2 oz)   LMP  (LMP Unknown)   SpO2 94%   Breastfeeding No   BMI 38.09 kg/m     GENERAL:  She appears comfortable sitting in the exam room.   EYES: normal.  HENT: normal.  RESP: lungs clear to auscultation - no rales, rhonchi or wheezes  CV: regular rates and rhythm, normal S1 S2, no S3 or S4 and no murmur, click or rub.  BREASTS: no masses.    NEURO: Grossly normal  MUSK: self ambulatory.   SKIN: normal.  Edema NO   PSYCHE: nl    A/P:  Zayra was seen today for physical and annual visit.    Diagnoses and all orders for this visit:    Encounter for screening mammogram for breast cancer  -     Mammogram, routine screening; Future    Screening for hyperlipidemia  -     Cancel: Lipid panel reflex to direct LDL Fasting; Future  -     Lipid panel reflex to direct LDL Fasting; Future    Screening for diabetes mellitus    Elevated hemoglobin A1c  -     Hemoglobin A1c; Future    Elevated serum creatinine  -     Comprehensive metabolic panel; Future      The patient voiced understanding of the information " discussed and all questions were answered.     Total time spent today with this patient including chart review, exam time with patient and documentation :   40 minutes    Neena MAHARAJ, REUBEN

## 2022-06-30 NOTE — NURSING NOTE
"Zayra Ramirez is a 52 year old female patient that presents today in clinic for the following:    Chief Complaint   Patient presents with     Physical     Annual Visit     The patient's allergies and medications were reviewed as noted. A set of vitals were recorded as noted without incident: /69 (BP Location: Right arm, Patient Position: Sitting, Cuff Size: Adult Large)   Pulse 86   Resp 16   Ht 1.619 m (5' 3.75\")   Wt 99.9 kg (220 lb 3.2 oz)   LMP  (LMP Unknown)   SpO2 94%   Breastfeeding No   BMI 38.09 kg/m  . The patient does not have any other questions for the provider.    Fady Rodriguez, EMT at 12:11 PM on 6/30/2022  "

## 2022-06-30 NOTE — PATIENT INSTRUCTIONS
To schedule a mammogram, please call radiology scheduling at [phone]. To schedule an lab appointment at the [facility], please call [phone].

## 2022-07-06 ENCOUNTER — THERAPY VISIT (OUTPATIENT)
Dept: PHYSICAL THERAPY | Facility: CLINIC | Age: 52
End: 2022-07-06
Payer: COMMERCIAL

## 2022-07-06 DIAGNOSIS — M25.512 BILATERAL SHOULDER PAIN: Primary | ICD-10-CM

## 2022-07-06 DIAGNOSIS — M25.511 BILATERAL SHOULDER PAIN: Primary | ICD-10-CM

## 2022-07-06 PROCEDURE — 97140 MANUAL THERAPY 1/> REGIONS: CPT | Mod: GP

## 2022-07-06 PROCEDURE — 97110 THERAPEUTIC EXERCISES: CPT | Mod: GP

## 2022-07-07 ENCOUNTER — LAB (OUTPATIENT)
Dept: LAB | Facility: CLINIC | Age: 52
End: 2022-07-07
Payer: COMMERCIAL

## 2022-07-07 DIAGNOSIS — Z79.899 HIGH RISK MEDICATION USE: ICD-10-CM

## 2022-07-07 DIAGNOSIS — R79.89 ELEVATED SERUM CREATININE: ICD-10-CM

## 2022-07-07 DIAGNOSIS — Z13.220 SCREENING FOR HYPERLIPIDEMIA: ICD-10-CM

## 2022-07-07 DIAGNOSIS — F17.200 SMOKER: ICD-10-CM

## 2022-07-07 DIAGNOSIS — M19.90 INFLAMMATORY ARTHRITIS: ICD-10-CM

## 2022-07-07 DIAGNOSIS — R73.09 ELEVATED HEMOGLOBIN A1C: ICD-10-CM

## 2022-07-07 LAB
ALBUMIN SERPL-MCNC: 3.4 G/DL (ref 3.4–5)
ALP SERPL-CCNC: 97 U/L (ref 40–150)
ALT SERPL W P-5'-P-CCNC: 33 U/L (ref 0–50)
ANION GAP SERPL CALCULATED.3IONS-SCNC: 5 MMOL/L (ref 3–14)
AST SERPL W P-5'-P-CCNC: 6 U/L (ref 0–45)
BILIRUB SERPL-MCNC: 0.4 MG/DL (ref 0.2–1.3)
BUN SERPL-MCNC: 20 MG/DL (ref 7–30)
CALCIUM SERPL-MCNC: 8.7 MG/DL (ref 8.5–10.1)
CHLORIDE BLD-SCNC: 104 MMOL/L (ref 94–109)
CHOLEST SERPL-MCNC: 149 MG/DL
CO2 SERPL-SCNC: 31 MMOL/L (ref 20–32)
CREAT SERPL-MCNC: 0.92 MG/DL (ref 0.52–1.04)
CRP SERPL-MCNC: 44.9 MG/L (ref 0–8)
FASTING STATUS PATIENT QL REPORTED: YES
GFR SERPL CREATININE-BSD FRML MDRD: 75 ML/MIN/1.73M2
GLUCOSE BLD-MCNC: 93 MG/DL (ref 70–99)
HBA1C MFR BLD: 5.3 % (ref 0–5.6)
HDLC SERPL-MCNC: 56 MG/DL
LDLC SERPL CALC-MCNC: 73 MG/DL
NONHDLC SERPL-MCNC: 93 MG/DL
POTASSIUM BLD-SCNC: 3.7 MMOL/L (ref 3.4–5.3)
PROT SERPL-MCNC: 8 G/DL (ref 6.8–8.8)
SODIUM SERPL-SCNC: 140 MMOL/L (ref 133–144)
TRIGL SERPL-MCNC: 101 MG/DL

## 2022-07-07 PROCEDURE — 80061 LIPID PANEL: CPT

## 2022-07-07 PROCEDURE — 82040 ASSAY OF SERUM ALBUMIN: CPT

## 2022-07-07 PROCEDURE — 86140 C-REACTIVE PROTEIN: CPT

## 2022-07-07 PROCEDURE — 83036 HEMOGLOBIN GLYCOSYLATED A1C: CPT

## 2022-07-07 PROCEDURE — 80053 COMPREHEN METABOLIC PANEL: CPT

## 2022-07-07 PROCEDURE — 36415 COLL VENOUS BLD VENIPUNCTURE: CPT

## 2022-07-11 ENCOUNTER — PRE VISIT (OUTPATIENT)
Dept: UROLOGY | Facility: CLINIC | Age: 52
End: 2022-07-11

## 2022-07-11 NOTE — TELEPHONE ENCOUNTER
MEDICAL RECORDS REQUEST   [facility]  Urology Clinic  [address] [place] [address]  PHONE: [phone]  Fax: [phone]        FUTURE VISIT INFORMATION                                                   ABRAM Merchant: 1970 scheduled for future visit at [facility]    APPOINTMENT INFORMATION:    Date: 07/15/2022    Provider:  Jamaal Dorsey MD    Reason for Visit/Diagnosis:  hesitant stream      RECORDS REQUESTED FOR VISIT                                                     NOTES  STATUS/DETAILS   OFFICE NOTE from referring provider   yes, 2022 -- Neena Tam APRN CNP -- Internal Medicine   MEDICATION LIST  yes   LABS     URINALYSIS (UA)  yes     PRE-VISIT CHECKLIST      Record collection complete Yes   Appointment appropriately scheduled           (right time/right provider) Yes   Joint diagnostic appointment coordinated correctly          (ensure right order & amount of time) Yes   MyChart activation Yes   Questionnaire complete If no, please explain PENDING

## 2022-07-13 ENCOUNTER — APPOINTMENT (OUTPATIENT)
Dept: MRI IMAGING | Facility: CLINIC | Age: 52
DRG: 554 | End: 2022-07-13
Attending: EMERGENCY MEDICINE
Payer: COMMERCIAL

## 2022-07-13 ENCOUNTER — APPOINTMENT (OUTPATIENT)
Dept: GENERAL RADIOLOGY | Facility: CLINIC | Age: 52
DRG: 554 | End: 2022-07-13
Attending: EMERGENCY MEDICINE
Payer: COMMERCIAL

## 2022-07-13 ENCOUNTER — HOSPITAL ENCOUNTER (INPATIENT)
Facility: CLINIC | Age: 52
LOS: 2 days | Discharge: HOME | DRG: 554 | End: 2022-07-15
Attending: EMERGENCY MEDICINE | Admitting: HOSPITALIST
Payer: COMMERCIAL

## 2022-07-13 DIAGNOSIS — M11.211: ICD-10-CM

## 2022-07-13 DIAGNOSIS — R79.82 ELEVATED C-REACTIVE PROTEIN (CRP): ICD-10-CM

## 2022-07-13 DIAGNOSIS — E87.6 HYPOKALEMIA: ICD-10-CM

## 2022-07-13 DIAGNOSIS — M17.11 PRIMARY OSTEOARTHRITIS OF RIGHT KNEE: ICD-10-CM

## 2022-07-13 DIAGNOSIS — D72.829 LEUKOCYTOSIS, UNSPECIFIED TYPE: ICD-10-CM

## 2022-07-13 DIAGNOSIS — M25.511 NONTRAUMATIC PAIN OF RIGHT SHOULDER: ICD-10-CM

## 2022-07-13 DIAGNOSIS — M11.819 CALCIUM PYROPHOSPHATE DEPOSITION DISEASE (CPDD) OF JOINT OF SHOULDER: ICD-10-CM

## 2022-07-13 DIAGNOSIS — M25.511 ACUTE PAIN OF RIGHT SHOULDER: Primary | ICD-10-CM

## 2022-07-13 LAB
ANION GAP SERPL CALCULATED.3IONS-SCNC: 6 MMOL/L (ref 3–14)
APPEARANCE FLD: ABNORMAL
BASOPHILS # BLD AUTO: 0.1 10E3/UL (ref 0–0.2)
BASOPHILS NFR BLD AUTO: 0 %
BUN SERPL-MCNC: 16 MG/DL (ref 7–30)
CALCIUM SERPL-MCNC: 8.9 MG/DL (ref 8.5–10.1)
CELL COUNT BODY FLUID SOURCE: ABNORMAL
CHLORIDE BLD-SCNC: 101 MMOL/L (ref 94–109)
CO2 SERPL-SCNC: 30 MMOL/L (ref 20–32)
COLOR FLD: ABNORMAL
CREAT SERPL-MCNC: 0.78 MG/DL (ref 0.52–1.04)
CRP SERPL-MCNC: 73.5 MG/L (ref 0–8)
CRYSTALS SNV MICRO: ABNORMAL
EOSINOPHIL # BLD AUTO: 0.1 10E3/UL (ref 0–0.7)
EOSINOPHIL NFR BLD AUTO: 1 %
ERYTHROCYTE [DISTWIDTH] IN BLOOD BY AUTOMATED COUNT: 14.4 % (ref 10–15)
ERYTHROCYTE [SEDIMENTATION RATE] IN BLOOD BY WESTERGREN METHOD: 27 MM/HR (ref 0–30)
GFR SERPL CREATININE-BSD FRML MDRD: >90 ML/MIN/1.73M2
GLUCOSE BLD-MCNC: 99 MG/DL (ref 70–99)
HCT VFR BLD AUTO: 39.7 % (ref 35–47)
HGB BLD-MCNC: 13.1 G/DL (ref 11.7–15.7)
HOLD SPECIMEN: NORMAL
IMM GRANULOCYTES # BLD: 0.1 10E3/UL
IMM GRANULOCYTES NFR BLD: 0 %
LYMPHOCYTES # BLD AUTO: 1.4 10E3/UL (ref 0.8–5.3)
LYMPHOCYTES NFR BLD AUTO: 9 %
LYMPHOCYTES NFR FLD MANUAL: NORMAL %
MAGNESIUM SERPL-MCNC: 2 MG/DL (ref 1.6–2.3)
MCH RBC QN AUTO: 31.6 PG (ref 26.5–33)
MCHC RBC AUTO-ENTMCNC: 33 G/DL (ref 31.5–36.5)
MCV RBC AUTO: 96 FL (ref 78–100)
MONOCYTES # BLD AUTO: 0.8 10E3/UL (ref 0–1.3)
MONOCYTES NFR BLD AUTO: 5 %
MONOS+MACROS NFR FLD MANUAL: NORMAL %
NEUTROPHILS # BLD AUTO: 14.1 10E3/UL (ref 1.6–8.3)
NEUTROPHILS NFR BLD AUTO: 85 %
NEUTS BAND NFR FLD MANUAL: NORMAL %
NRBC # BLD AUTO: 0 10E3/UL
NRBC BLD AUTO-RTO: 0 /100
PLATELET # BLD AUTO: 338 10E3/UL (ref 150–450)
POTASSIUM BLD-SCNC: 2.7 MMOL/L (ref 3.4–5.3)
RBC # BLD AUTO: 4.14 10E6/UL (ref 3.8–5.2)
SARS-COV-2 RNA RESP QL NAA+PROBE: NEGATIVE
SODIUM SERPL-SCNC: 137 MMOL/L (ref 133–144)
WBC # BLD AUTO: 16.5 10E3/UL (ref 4–11)

## 2022-07-13 PROCEDURE — 83735 ASSAY OF MAGNESIUM: CPT | Performed by: EMERGENCY MEDICINE

## 2022-07-13 PROCEDURE — 87040 BLOOD CULTURE FOR BACTERIA: CPT | Performed by: PHYSICIAN ASSISTANT

## 2022-07-13 PROCEDURE — 36415 COLL VENOUS BLD VENIPUNCTURE: CPT | Performed by: PHYSICIAN ASSISTANT

## 2022-07-13 PROCEDURE — 73030 X-RAY EXAM OF SHOULDER: CPT | Mod: RT

## 2022-07-13 PROCEDURE — C9803 HOPD COVID-19 SPEC COLLECT: HCPCS

## 2022-07-13 PROCEDURE — 20606 DRAIN/INJ JOINT/BURSA W/US: CPT | Mod: RT

## 2022-07-13 PROCEDURE — 82310 ASSAY OF CALCIUM: CPT | Performed by: EMERGENCY MEDICINE

## 2022-07-13 PROCEDURE — 87070 CULTURE OTHR SPECIMN AEROBIC: CPT | Performed by: EMERGENCY MEDICINE

## 2022-07-13 PROCEDURE — 0R9J3ZX DRAINAGE OF RIGHT SHOULDER JOINT, PERCUTANEOUS APPROACH, DIAGNOSTIC: ICD-10-PCS | Performed by: EMERGENCY MEDICINE

## 2022-07-13 PROCEDURE — 96367 TX/PROPH/DG ADDL SEQ IV INF: CPT

## 2022-07-13 PROCEDURE — 258N000003 HC RX IP 258 OP 636: Performed by: EMERGENCY MEDICINE

## 2022-07-13 PROCEDURE — 96365 THER/PROPH/DIAG IV INF INIT: CPT

## 2022-07-13 PROCEDURE — 250N000013 HC RX MED GY IP 250 OP 250 PS 637: Performed by: EMERGENCY MEDICINE

## 2022-07-13 PROCEDURE — 86140 C-REACTIVE PROTEIN: CPT | Performed by: EMERGENCY MEDICINE

## 2022-07-13 PROCEDURE — 36415 COLL VENOUS BLD VENIPUNCTURE: CPT | Performed by: EMERGENCY MEDICINE

## 2022-07-13 PROCEDURE — 96361 HYDRATE IV INFUSION ADD-ON: CPT

## 2022-07-13 PROCEDURE — 87015 SPECIMEN INFECT AGNT CONCNTJ: CPT | Performed by: EMERGENCY MEDICINE

## 2022-07-13 PROCEDURE — 255N000002 HC RX 255 OP 636: Performed by: EMERGENCY MEDICINE

## 2022-07-13 PROCEDURE — 89060 EXAM SYNOVIAL FLUID CRYSTALS: CPT | Performed by: EMERGENCY MEDICINE

## 2022-07-13 PROCEDURE — 73223 MRI JOINT UPR EXTR W/O&W/DYE: CPT | Mod: RT

## 2022-07-13 PROCEDURE — 89050 BODY FLUID CELL COUNT: CPT | Performed by: EMERGENCY MEDICINE

## 2022-07-13 PROCEDURE — 89051 BODY FLUID CELL COUNT: CPT | Performed by: PHYSICIAN ASSISTANT

## 2022-07-13 PROCEDURE — 99223 1ST HOSP IP/OBS HIGH 75: CPT | Mod: AI | Performed by: HOSPITALIST

## 2022-07-13 PROCEDURE — 96375 TX/PRO/DX INJ NEW DRUG ADDON: CPT

## 2022-07-13 PROCEDURE — 87040 BLOOD CULTURE FOR BACTERIA: CPT | Performed by: EMERGENCY MEDICINE

## 2022-07-13 PROCEDURE — 85004 AUTOMATED DIFF WBC COUNT: CPT | Performed by: EMERGENCY MEDICINE

## 2022-07-13 PROCEDURE — 250N000011 HC RX IP 250 OP 636: Performed by: EMERGENCY MEDICINE

## 2022-07-13 PROCEDURE — 85652 RBC SED RATE AUTOMATED: CPT | Performed by: EMERGENCY MEDICINE

## 2022-07-13 PROCEDURE — U0003 INFECTIOUS AGENT DETECTION BY NUCLEIC ACID (DNA OR RNA); SEVERE ACUTE RESPIRATORY SYNDROME CORONAVIRUS 2 (SARS-COV-2) (CORONAVIRUS DISEASE [COVID-19]), AMPLIFIED PROBE TECHNIQUE, MAKING USE OF HIGH THROUGHPUT TECHNOLOGIES AS DESCRIBED BY CMS-2020-01-R: HCPCS | Performed by: EMERGENCY MEDICINE

## 2022-07-13 PROCEDURE — 120N000001 HC R&B MED SURG/OB

## 2022-07-13 PROCEDURE — A9585 GADOBUTROL INJECTION: HCPCS | Performed by: EMERGENCY MEDICINE

## 2022-07-13 PROCEDURE — 99285 EMERGENCY DEPT VISIT HI MDM: CPT | Mod: 25

## 2022-07-13 RX ORDER — KETOROLAC TROMETHAMINE 15 MG/ML
15 INJECTION, SOLUTION INTRAMUSCULAR; INTRAVENOUS ONCE
Status: COMPLETED | OUTPATIENT
Start: 2022-07-13 | End: 2022-07-13

## 2022-07-13 RX ORDER — MORPHINE SULFATE 4 MG/ML
4 INJECTION, SOLUTION INTRAMUSCULAR; INTRAVENOUS ONCE
Status: COMPLETED | OUTPATIENT
Start: 2022-07-13 | End: 2022-07-13

## 2022-07-13 RX ORDER — GADOBUTROL 604.72 MG/ML
10 INJECTION INTRAVENOUS ONCE
Status: COMPLETED | OUTPATIENT
Start: 2022-07-13 | End: 2022-07-13

## 2022-07-13 RX ORDER — POTASSIUM CHLORIDE 1500 MG/1
60 TABLET, EXTENDED RELEASE ORAL ONCE
Status: COMPLETED | OUTPATIENT
Start: 2022-07-13 | End: 2022-07-13

## 2022-07-13 RX ORDER — LORAZEPAM 1 MG/1
1 TABLET ORAL ONCE
Status: COMPLETED | OUTPATIENT
Start: 2022-07-13 | End: 2022-07-13

## 2022-07-13 RX ORDER — POTASSIUM CHLORIDE 7.45 MG/ML
10 INJECTION INTRAVENOUS ONCE
Status: COMPLETED | OUTPATIENT
Start: 2022-07-13 | End: 2022-07-13

## 2022-07-13 RX ORDER — CEFTRIAXONE 1 G/1
1 INJECTION, POWDER, FOR SOLUTION INTRAMUSCULAR; INTRAVENOUS ONCE
Status: COMPLETED | OUTPATIENT
Start: 2022-07-13 | End: 2022-07-13

## 2022-07-13 RX ORDER — CEFAZOLIN SODIUM 1 G/50ML
2000 SOLUTION INTRAVENOUS ONCE
Status: COMPLETED | OUTPATIENT
Start: 2022-07-13 | End: 2022-07-14

## 2022-07-13 RX ORDER — ACETAMINOPHEN 500 MG
1000 TABLET ORAL ONCE
Status: COMPLETED | OUTPATIENT
Start: 2022-07-13 | End: 2022-07-13

## 2022-07-13 RX ADMIN — GADOBUTROL 10 ML: 604.72 INJECTION INTRAVENOUS at 23:06

## 2022-07-13 RX ADMIN — CEFTRIAXONE 1 G: 1 INJECTION, POWDER, FOR SOLUTION INTRAMUSCULAR; INTRAVENOUS at 22:44

## 2022-07-13 RX ADMIN — SODIUM CHLORIDE 1000 ML: 9 INJECTION, SOLUTION INTRAVENOUS at 19:05

## 2022-07-13 RX ADMIN — KETOROLAC TROMETHAMINE 15 MG: 15 INJECTION, SOLUTION INTRAMUSCULAR; INTRAVENOUS at 19:07

## 2022-07-13 RX ADMIN — POTASSIUM CHLORIDE 60 MEQ: 1500 TABLET, EXTENDED RELEASE ORAL at 19:57

## 2022-07-13 RX ADMIN — MORPHINE SULFATE 4 MG: 4 INJECTION INTRAVENOUS at 19:08

## 2022-07-13 RX ADMIN — LORAZEPAM 1 MG: 1 TABLET ORAL at 22:48

## 2022-07-13 RX ADMIN — POTASSIUM CHLORIDE 10 MEQ: 7.46 INJECTION, SOLUTION INTRAVENOUS at 19:57

## 2022-07-13 RX ADMIN — ACETAMINOPHEN 1000 MG: 500 TABLET, FILM COATED ORAL at 19:06

## 2022-07-13 ASSESSMENT — ENCOUNTER SYMPTOMS
NUMBNESS: 0
FEVER: 0

## 2022-07-13 ASSESSMENT — ACTIVITIES OF DAILY LIVING (ADL): ADLS_ACUITY_SCORE: 37

## 2022-07-13 NOTE — ED TRIAGE NOTES
Pt here with c/o R shoulder pain after working in yard yesterday. States she has rotator cuff vs RA issues. CMS intact. ABC intact.

## 2022-07-13 NOTE — ED PROVIDER NOTES
History   Chief Complaint:  Shoulder Pain     The history is provided by the patient.      Zayra Ramirez is a 52 year old female who presents with shoulder pain. Zayra has chronic bilateral shoulder pain for which she seems orthopedics and rheumatology at the [facility]. She reports these specialists are unsure if her pain is related to RA (for which she is on methotrexate) or rotator cuff issues. Yesterday she worked in her yard but did not sustain any injury or fall. Today she has increased non-radiating right shoulder pain that is severe such that she cannot move her right upper extremity. She has no associated fever. She had no relief with ibuprofen (7 hours prior to interview) or Tylenol (5 hours prior to interview) nor with ice or heat. She denies numbness or tingling.    Review of Systems   Constitutional: Negative for activity change and fever.   Musculoskeletal: Positive for arthralgias (right shoulder).   Skin: Negative for wound.   Neurological: Negative for numbness.   All other systems reviewed and are negative.    Allergies:  Bee Venom  Bees  Doxycycline  Erythromycin  Hydrocodone-Acetaminophen    Medications:  Proair HFA  Abilify  Buspar  Celebrex  Zyrtec  Flexeril  Cymbalta  Methotrexate  Folvite  Singulair  Prilosec  Lyrica  Vitamin D  Zinc    Past Medical History:     Facial cellulitis  Obesity  Dental abscess  Hypoxia  Subcutaneous emphysema  Borderline personality disorder  Stenosis of cervical spine  GERD  Hypertension  Interstitial lung disease  Asthma  Onychomycosis  Restless leg syndrome  Smoker  Bronchiolitis  Bilateral shoulder pain  Spinal epidural abscess  Lumbar spondylitis  Arthritis  Articular disc disorder  Calculus of gallbladder  Myofascial pain  Cholelithiasis  COPD  Sleep apnea    Past Surgical History:    Cervical fusion  Colonoscopy x2  ENT surgery  Excise lesion intraoral  HC drain abscess  Head and neck surgery  Hysterectomy  Epidural  Laminectomy   Radio  frequency ablation  Spinal cord stimulato  Spinal cord stimulator removal  EDG surgery    Family History:    Mother: asthma, restless leg syndrome,arthritis, heart disease, thyroid diesase  Father: base of tongue cancer, hypertension, back pain, restless leg syndrome, CAD, alcoholism, diabetes, heart disease, depression  Sister: restless leg syndrome, diabetes, thyroid disease, psychiatric illness    Social History:  The patient presents to the ED alone.    Physical Exam     Patient Vitals for the past 24 hrs:   BP Temp Temp src Pulse Resp SpO2   07/13/22 1714 132/80 97.3  F (36.3  C) Temporal 98 16 96 %     Physical Exam  General: Well-developed and well-nourished. Well appearing middle aged  woman. Cooperative.  Head:  Atraumatic.  Eyes:  Conjunctivae, lids, and sclerae are normal.  Neck:  Supple. Normal range of motion.  CV:  Regular rate and rhythm. Normal heart sounds with no murmurs, rubs, or gallops detected. 2+ right radial pulse.  Resp:  No respiratory distress. Clear to auscultation bilaterally without decreased breath sounds, wheezing, rales, or rhonchi.    MS:  Virtually no ROM of the right shoulder due to pain. Tenderness to palpation diffusely without focal bony tenderness or deformity. No obvious edema. No skin changes.  Skin:  Warm. Non-diaphoretic. No pallor.  Neuro:  Awake. A&Ox3. Normal strength and sensation to light touch in the right upper extremity.  Psych: Normal mood and affect. Normal speech.  Vitals reviewed.    Emergency Department Course     Imaging:  XR Shoulder Right G/E 3 Views   Final Result   IMPRESSION: Anatomic alignment right shoulder. No acute displaced right shoulder fracture. Mild-moderate right acromioclavicular joint arthrosis. Glenohumeral joint space is not profiled. Visualized right lung grossly clear. Degenerative change    visualized spine partially seen. Postop change lower cervical spine.       MR Shoulder Right w/o & w Contrast    (Results Pending)      Report per radiology    Laboratory:  Labs Ordered and Resulted from Time of ED Arrival to Time of ED Departure   BASIC METABOLIC PANEL - Abnormal       Result Value    Sodium 137      Potassium 2.7 (*)     Chloride 101      Carbon Dioxide (CO2) 30      Anion Gap 6      Urea Nitrogen 16      Creatinine 0.78      Calcium 8.9      Glucose 99      GFR Estimate >90     CRP INFLAMMATION - Abnormal    CRP Inflammation 73.5 (*)    CBC WITH PLATELETS AND DIFFERENTIAL - Abnormal    WBC Count 16.5 (*)     RBC Count 4.14      Hemoglobin 13.1      Hematocrit 39.7      MCV 96      MCH 31.6      MCHC 33.0      RDW 14.4      Platelet Count 338      % Neutrophils 85      % Lymphocytes 9      % Monocytes 5      % Eosinophils 1      % Basophils 0      % Immature Granulocytes 0      NRBCs per 100 WBC 0      Absolute Neutrophils 14.1 (*)     Absolute Lymphocytes 1.4      Absolute Monocytes 0.8      Absolute Eosinophils 0.1      Absolute Basophils 0.1      Absolute Immature Granulocytes 0.1      Absolute NRBCs 0.0     CRYSTAL ID SYNOVIAL FLUID - Abnormal    Crystals Analysis   (*)     Value: Positive for intracellular crystals, consistent with calcium pyrophosphate dihydrate crystals.   CELL COUNT BODY FLUID - Abnormal    Color Red (*)     Clarity Cloudy (*)     Cell Count Fluid Source Right Shoulder     ERYTHROCYTE SEDIMENTATION RATE AUTO - Normal    Erythrocyte Sedimentation Rate 27     MAGNESIUM - Normal    Magnesium 2.0     COVID-19 VIRUS (CORONAVIRUS) BY PCR - Normal    SARS CoV2 PCR Negative     DIFERENTIAL BODY FLUID    % Neutrophils        % Lymphocytes        % Monocyte/Macrophages       GRAM STAIN   AEROBIC BACTERIAL CULTURE ROUTINE   BLOOD CULTURE   BLOOD CULTURE   CELL COUNT WITH DIFFERENTIAL FLUID        Arthrocentesis    Date/Time: 7/14/2022 8:43 PM  Performed by: Jenifer Baird MD  Authorized by: Jenifer Baird MD     Risks, benefits and alternatives discussed.      LOCATION:   Location:  Shoulder  Shoulder:  R  glenohumeral  ANESTHESIA (see MAR for exact dosages):   Anesthesia method:  Local infiltration  Local anesthetic:  Lidocaine 1% WITH epi      PROCEDURE DETAILS:     Needle gauge:  18 G    Ultrasound guidance: yes      Approach:  Anterior    Aspirate amount:  1.5 cc    Aspirate characteristics:  Blood-tinged    Steroid injected: no      Specimen collected: yes      Dressing: adhesive bandage      PROCEDURE  Describe Procedure: 2 attempts at aspiration with only 1.5 ml fluid collected in total.   Patient Tolerance:  Patient tolerated the procedure well with no immediate complications    Emergency Department Course:  Reviewed:  I reviewed nursing notes, vitals, past medical history, and Care Everywhere.    Assessments:  1835 I obtained history and examined the patient as noted above.   1947 I rechecked the patient and her pain is 4-5/10 at rest.   2009 I obtained written consent for arthrocentesis.   2136 I rechecked the patient and discussed admission.     Interventions:  1905 NS 1,000 mL IV  1906 Tylenol 1,000 mg Oral  1907 Toradol 15 mg IV  1908 Morphine 4 mg IV  1957 KCl 10 mEq IV  1957 Klor-con m 60 mEq Oral  2244 Rocephin 1 g IV  2248 Ativan 1 mg Oral  0014 Vancomycin 2 g IV    Consults:   2124 I spoke with the lab. They only have enough fluid for culture. The cannot perform cell count due to clotting.  2142 I spoke with Dr. Smith, orthopedics, regarding the patient.  2207 I spoke with Dr. Wilson, hospitalist, regarding the patient.  2224 I spoke with Dr. Smith again.   2225 I spoke with Dr. Wilson again.    Disposition:  The patient was admitted to the hospital under the care of Dr. Wilson.     Impression & Planj   Medical Decision Making:  Zayra is a 52 year old woman who has chronic shoulder pain but had acute worsening of the right shoulder pain starting today and it was not relieved with home Tylenol, ibuprofen, heat, or ice.  She denies any other symptoms including fever.  On exam she appears  generally well.  However, she has tenderness with any palpation of the right shoulder and extreme tenderness with any range of motion such that she screams out in pain.  Certainly this is concerning for septic arthritis so I obtained laboratory studies which do reveal elevated CRP at 73.5 and leukocytosis to 16.5.  X-ray shows no acute pathology.  ESR is normal.  Hemoglobin is normal.  She is noted to have hypokalemia which was repleted with oral and IV potassium.  She was treated with IV fluids in addition to Toradol, Tylenol, and morphine.  This improved her pain to 4 out of 10 at rest but remained severe with any movement.  As such she provided written consent for arthrocentesis after risks and benefits were discussed.  On 2 attempts at arthrocentesis I got only about 1.5 cc of fluid that was bloody.  As such, cell count could not be obtained but it was sent for culture and gram stain.  At this point I cannot rule out a septic arthritis and she remains in a significant degree of pain.  As such, she will require hospitalization.  I spoke with orthopedics, Dr. Smith, who recommends MRI of the shoulder with and without contrast which I did order.  He also recommended I make patient NPO after midnight.  I spoke with Dr. Wilson, hospitalist, who accepts admission.  He would like antibiotics initiated and orthopedics was okay with this.  I gave her Rocephin and vancomycin.  Just before she was sent to the floor crystals from her joint aspiration returned positive for CPPD.  This pseudogout could be the cause of her pain but culture and MRI are still pending.  I updated her multiple times on findings and plan and answered all her questions.  She verbalized understanding and is amenable.    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        Jenifer Baird MD  07/14/22 2057

## 2022-07-14 LAB
ANION GAP SERPL CALCULATED.3IONS-SCNC: 4 MMOL/L (ref 3–14)
BUN SERPL-MCNC: 14 MG/DL (ref 7–30)
CALCIUM SERPL-MCNC: 8.2 MG/DL (ref 8.5–10.1)
CHLORIDE BLD-SCNC: 107 MMOL/L (ref 94–109)
CO2 SERPL-SCNC: 28 MMOL/L (ref 20–32)
CREAT SERPL-MCNC: 0.8 MG/DL (ref 0.52–1.04)
CRP SERPL-MCNC: 101 MG/L (ref 0–8)
ERYTHROCYTE [DISTWIDTH] IN BLOOD BY AUTOMATED COUNT: 14.5 % (ref 10–15)
GFR SERPL CREATININE-BSD FRML MDRD: 89 ML/MIN/1.73M2
GLUCOSE BLD-MCNC: 101 MG/DL (ref 70–99)
GRAM STAIN RESULT: NORMAL
HCT VFR BLD AUTO: 36.8 % (ref 35–47)
HGB BLD-MCNC: 12.2 G/DL (ref 11.7–15.7)
MCH RBC QN AUTO: 31.7 PG (ref 26.5–33)
MCHC RBC AUTO-ENTMCNC: 33.2 G/DL (ref 31.5–36.5)
MCV RBC AUTO: 96 FL (ref 78–100)
PLATELET # BLD AUTO: 285 10E3/UL (ref 150–450)
POTASSIUM BLD-SCNC: 3 MMOL/L (ref 3.4–5.3)
POTASSIUM BLD-SCNC: 3.5 MMOL/L (ref 3.4–5.3)
POTASSIUM BLD-SCNC: 3.8 MMOL/L (ref 3.4–5.3)
PROCALCITONIN SERPL-MCNC: <0.05 NG/ML
RBC # BLD AUTO: 3.85 10E6/UL (ref 3.8–5.2)
SODIUM SERPL-SCNC: 139 MMOL/L (ref 133–144)
WBC # BLD AUTO: 10.1 10E3/UL (ref 4–11)

## 2022-07-14 PROCEDURE — 99232 SBSQ HOSP IP/OBS MODERATE 35: CPT | Performed by: INTERNAL MEDICINE

## 2022-07-14 PROCEDURE — 84145 PROCALCITONIN (PCT): CPT | Performed by: ORTHOPAEDIC SURGERY

## 2022-07-14 PROCEDURE — 84132 ASSAY OF SERUM POTASSIUM: CPT | Performed by: INTERNAL MEDICINE

## 2022-07-14 PROCEDURE — 258N000003 HC RX IP 258 OP 636: Performed by: HOSPITALIST

## 2022-07-14 PROCEDURE — 250N000013 HC RX MED GY IP 250 OP 250 PS 637: Performed by: ORTHOPAEDIC SURGERY

## 2022-07-14 PROCEDURE — 36415 COLL VENOUS BLD VENIPUNCTURE: CPT | Performed by: INTERNAL MEDICINE

## 2022-07-14 PROCEDURE — 250N000013 HC RX MED GY IP 250 OP 250 PS 637: Performed by: HOSPITALIST

## 2022-07-14 PROCEDURE — 84132 ASSAY OF SERUM POTASSIUM: CPT | Performed by: HOSPITALIST

## 2022-07-14 PROCEDURE — 36415 COLL VENOUS BLD VENIPUNCTURE: CPT | Performed by: HOSPITALIST

## 2022-07-14 PROCEDURE — 85027 COMPLETE CBC AUTOMATED: CPT | Performed by: HOSPITALIST

## 2022-07-14 PROCEDURE — 120N000001 HC R&B MED SURG/OB

## 2022-07-14 PROCEDURE — 86140 C-REACTIVE PROTEIN: CPT | Performed by: HOSPITALIST

## 2022-07-14 PROCEDURE — 36415 COLL VENOUS BLD VENIPUNCTURE: CPT | Performed by: ORTHOPAEDIC SURGERY

## 2022-07-14 PROCEDURE — 250N000011 HC RX IP 250 OP 636: Performed by: EMERGENCY MEDICINE

## 2022-07-14 PROCEDURE — 258N000003 HC RX IP 258 OP 636: Performed by: EMERGENCY MEDICINE

## 2022-07-14 PROCEDURE — 250N000011 HC RX IP 250 OP 636: Performed by: HOSPITALIST

## 2022-07-14 PROCEDURE — 250N000013 HC RX MED GY IP 250 OP 250 PS 637: Performed by: INTERNAL MEDICINE

## 2022-07-14 PROCEDURE — 80048 BASIC METABOLIC PNL TOTAL CA: CPT | Performed by: INTERNAL MEDICINE

## 2022-07-14 RX ORDER — ROPINIROLE 0.25 MG/1
0.5 TABLET, FILM COATED ORAL AT BEDTIME
Status: DISCONTINUED | OUTPATIENT
Start: 2022-07-14 | End: 2022-07-15 | Stop reason: HOSPADM

## 2022-07-14 RX ORDER — POTASSIUM CHLORIDE 1500 MG/1
40 TABLET, EXTENDED RELEASE ORAL ONCE
Status: COMPLETED | OUTPATIENT
Start: 2022-07-14 | End: 2022-07-14

## 2022-07-14 RX ORDER — INDOMETHACIN 25 MG/1
75 CAPSULE ORAL 2 TIMES DAILY WITH MEALS
Status: DISCONTINUED | OUTPATIENT
Start: 2022-07-14 | End: 2022-07-15 | Stop reason: HOSPADM

## 2022-07-14 RX ORDER — ARIPIPRAZOLE 5 MG/1
5 TABLET ORAL DAILY
Status: DISCONTINUED | OUTPATIENT
Start: 2022-07-14 | End: 2022-07-15 | Stop reason: HOSPADM

## 2022-07-14 RX ORDER — CEFAZOLIN SODIUM 2 G/100ML
2 INJECTION, SOLUTION INTRAVENOUS
Status: CANCELLED | OUTPATIENT
Start: 2022-07-14

## 2022-07-14 RX ORDER — MONTELUKAST SODIUM 10 MG/1
10 TABLET ORAL AT BEDTIME
Status: DISCONTINUED | OUTPATIENT
Start: 2022-07-14 | End: 2022-07-15 | Stop reason: HOSPADM

## 2022-07-14 RX ORDER — HYDROMORPHONE HCL IN WATER/PF 6 MG/30 ML
0.2 PATIENT CONTROLLED ANALGESIA SYRINGE INTRAVENOUS
Status: DISCONTINUED | OUTPATIENT
Start: 2022-07-14 | End: 2022-07-15 | Stop reason: HOSPADM

## 2022-07-14 RX ORDER — ONDANSETRON 4 MG/1
4 TABLET, ORALLY DISINTEGRATING ORAL EVERY 6 HOURS PRN
Status: DISCONTINUED | OUTPATIENT
Start: 2022-07-14 | End: 2022-07-15 | Stop reason: HOSPADM

## 2022-07-14 RX ORDER — ALBUTEROL SULFATE 90 UG/1
2 AEROSOL, METERED RESPIRATORY (INHALATION) EVERY 6 HOURS PRN
Status: DISCONTINUED | OUTPATIENT
Start: 2022-07-14 | End: 2022-07-15 | Stop reason: HOSPADM

## 2022-07-14 RX ORDER — POTASSIUM CHLORIDE 1500 MG/1
20 TABLET, EXTENDED RELEASE ORAL ONCE
Status: COMPLETED | OUTPATIENT
Start: 2022-07-14 | End: 2022-07-14

## 2022-07-14 RX ORDER — NALOXONE HYDROCHLORIDE 0.4 MG/ML
0.2 INJECTION, SOLUTION INTRAMUSCULAR; INTRAVENOUS; SUBCUTANEOUS
Status: DISCONTINUED | OUTPATIENT
Start: 2022-07-14 | End: 2022-07-15 | Stop reason: HOSPADM

## 2022-07-14 RX ORDER — CEFAZOLIN SODIUM 2 G/100ML
2 INJECTION, SOLUTION INTRAVENOUS SEE ADMIN INSTRUCTIONS
Status: CANCELLED | OUTPATIENT
Start: 2022-07-14

## 2022-07-14 RX ORDER — OMEPRAZOLE
20 KIT
Status: DISCONTINUED | OUTPATIENT
Start: 2022-07-15 | End: 2022-07-14

## 2022-07-14 RX ORDER — IBUPROFEN 600 MG/1
600 TABLET, FILM COATED ORAL 3 TIMES DAILY
Status: DISCONTINUED | OUTPATIENT
Start: 2022-07-14 | End: 2022-07-15

## 2022-07-14 RX ORDER — ARIPIPRAZOLE 5 MG/1
5 TABLET ORAL EVERY MORNING
COMMUNITY

## 2022-07-14 RX ORDER — ACETAMINOPHEN 325 MG/1
975 TABLET ORAL EVERY 8 HOURS
Status: DISCONTINUED | OUTPATIENT
Start: 2022-07-14 | End: 2022-07-15 | Stop reason: HOSPADM

## 2022-07-14 RX ORDER — PREGABALIN 75 MG/1
150 CAPSULE ORAL 2 TIMES DAILY
Status: DISCONTINUED | OUTPATIENT
Start: 2022-07-14 | End: 2022-07-15 | Stop reason: HOSPADM

## 2022-07-14 RX ORDER — NALOXONE HYDROCHLORIDE 0.4 MG/ML
0.4 INJECTION, SOLUTION INTRAMUSCULAR; INTRAVENOUS; SUBCUTANEOUS
Status: DISCONTINUED | OUTPATIENT
Start: 2022-07-14 | End: 2022-07-15 | Stop reason: HOSPADM

## 2022-07-14 RX ORDER — BUSPIRONE HYDROCHLORIDE 15 MG/1
30 TABLET ORAL 2 TIMES DAILY
Status: DISCONTINUED | OUTPATIENT
Start: 2022-07-14 | End: 2022-07-15 | Stop reason: HOSPADM

## 2022-07-14 RX ORDER — LIDOCAINE 40 MG/G
CREAM TOPICAL
Status: DISCONTINUED | OUTPATIENT
Start: 2022-07-14 | End: 2022-07-15 | Stop reason: HOSPADM

## 2022-07-14 RX ORDER — DULOXETIN HYDROCHLORIDE 60 MG/1
120 CAPSULE, DELAYED RELEASE ORAL AT BEDTIME
Status: DISCONTINUED | OUTPATIENT
Start: 2022-07-14 | End: 2022-07-15 | Stop reason: HOSPADM

## 2022-07-14 RX ORDER — ONDANSETRON 2 MG/ML
4 INJECTION INTRAMUSCULAR; INTRAVENOUS EVERY 6 HOURS PRN
Status: DISCONTINUED | OUTPATIENT
Start: 2022-07-14 | End: 2022-07-15 | Stop reason: HOSPADM

## 2022-07-14 RX ORDER — CYCLOBENZAPRINE HCL 10 MG
10 TABLET ORAL AT BEDTIME
Status: DISCONTINUED | OUTPATIENT
Start: 2022-07-14 | End: 2022-07-15 | Stop reason: HOSPADM

## 2022-07-14 RX ORDER — SODIUM CHLORIDE 9 MG/ML
INJECTION, SOLUTION INTRAVENOUS CONTINUOUS
Status: DISCONTINUED | OUTPATIENT
Start: 2022-07-14 | End: 2022-07-15 | Stop reason: HOSPADM

## 2022-07-14 RX ORDER — OXYCODONE HYDROCHLORIDE 5 MG/1
5 TABLET ORAL EVERY 4 HOURS PRN
Status: DISCONTINUED | OUTPATIENT
Start: 2022-07-14 | End: 2022-07-15 | Stop reason: HOSPADM

## 2022-07-14 RX ORDER — FOLIC ACID 1 MG/1
1 TABLET ORAL DAILY
Status: DISCONTINUED | OUTPATIENT
Start: 2022-07-14 | End: 2022-07-15 | Stop reason: HOSPADM

## 2022-07-14 RX ADMIN — ACETAMINOPHEN 975 MG: 325 TABLET, FILM COATED ORAL at 14:21

## 2022-07-14 RX ADMIN — POTASSIUM CHLORIDE 40 MEQ: 1500 TABLET, EXTENDED RELEASE ORAL at 09:44

## 2022-07-14 RX ADMIN — BUSPIRONE HYDROCHLORIDE 30 MG: 15 TABLET ORAL at 20:40

## 2022-07-14 RX ADMIN — CYCLOBENZAPRINE 10 MG: 10 TABLET, FILM COATED ORAL at 21:55

## 2022-07-14 RX ADMIN — ACETAMINOPHEN 975 MG: 325 TABLET, FILM COATED ORAL at 06:00

## 2022-07-14 RX ADMIN — ROPINIROLE HYDROCHLORIDE 0.5 MG: 0.25 TABLET, FILM COATED ORAL at 01:03

## 2022-07-14 RX ADMIN — ARIPIPRAZOLE 5 MG: 5 TABLET ORAL at 20:41

## 2022-07-14 RX ADMIN — IBUPROFEN 600 MG: 600 TABLET, FILM COATED ORAL at 20:41

## 2022-07-14 RX ADMIN — TAZOBACTAM SODIUM AND PIPERACILLIN SODIUM 3.38 G: 375; 3 INJECTION, SOLUTION INTRAVENOUS at 17:18

## 2022-07-14 RX ADMIN — MONTELUKAST 10 MG: 10 TABLET, FILM COATED ORAL at 01:03

## 2022-07-14 RX ADMIN — MONTELUKAST 10 MG: 10 TABLET, FILM COATED ORAL at 21:55

## 2022-07-14 RX ADMIN — TAZOBACTAM SODIUM AND PIPERACILLIN SODIUM 3.38 G: 375; 3 INJECTION, SOLUTION INTRAVENOUS at 06:00

## 2022-07-14 RX ADMIN — INDOMETHACIN 75 MG: 25 CAPSULE ORAL at 17:18

## 2022-07-14 RX ADMIN — ACETAMINOPHEN 975 MG: 325 TABLET, FILM COATED ORAL at 21:55

## 2022-07-14 RX ADMIN — BUSPIRONE HYDROCHLORIDE 30 MG: 15 TABLET ORAL at 09:43

## 2022-07-14 RX ADMIN — VANCOMYCIN HYDROCHLORIDE 2000 MG: 5 INJECTION, POWDER, LYOPHILIZED, FOR SOLUTION INTRAVENOUS at 00:14

## 2022-07-14 RX ADMIN — ALBUTEROL SULFATE 2 PUFF: 90 AEROSOL, METERED RESPIRATORY (INHALATION) at 11:37

## 2022-07-14 RX ADMIN — DULOXETINE HYDROCHLORIDE 120 MG: 60 CAPSULE, DELAYED RELEASE ORAL at 21:55

## 2022-07-14 RX ADMIN — IBUPROFEN 600 MG: 600 TABLET, FILM COATED ORAL at 14:21

## 2022-07-14 RX ADMIN — PREGABALIN 150 MG: 75 CAPSULE ORAL at 20:40

## 2022-07-14 RX ADMIN — PREGABALIN 150 MG: 75 CAPSULE ORAL at 09:44

## 2022-07-14 RX ADMIN — SODIUM CHLORIDE: 9 INJECTION, SOLUTION INTRAVENOUS at 02:13

## 2022-07-14 RX ADMIN — TAZOBACTAM SODIUM AND PIPERACILLIN SODIUM 3.38 G: 375; 3 INJECTION, SOLUTION INTRAVENOUS at 11:38

## 2022-07-14 RX ADMIN — VANCOMYCIN HYDROCHLORIDE 1250 MG: 10 INJECTION, POWDER, LYOPHILIZED, FOR SOLUTION INTRAVENOUS at 12:46

## 2022-07-14 RX ADMIN — OXYCODONE HYDROCHLORIDE 5 MG: 5 TABLET ORAL at 16:17

## 2022-07-14 RX ADMIN — SODIUM CHLORIDE: 9 INJECTION, SOLUTION INTRAVENOUS at 12:46

## 2022-07-14 RX ADMIN — ROPINIROLE HYDROCHLORIDE 0.5 MG: 0.25 TABLET, FILM COATED ORAL at 21:55

## 2022-07-14 RX ADMIN — POTASSIUM CHLORIDE 20 MEQ: 1500 TABLET, EXTENDED RELEASE ORAL at 11:38

## 2022-07-14 RX ADMIN — FOLIC ACID 1 MG: 1 TABLET ORAL at 09:43

## 2022-07-14 ASSESSMENT — ACTIVITIES OF DAILY LIVING (ADL)
ADLS_ACUITY_SCORE: 22
ADLS_ACUITY_SCORE: 37
ADLS_ACUITY_SCORE: 22

## 2022-07-14 ASSESSMENT — ENCOUNTER SYMPTOMS
WOUND: 0
ARTHRALGIAS: 1
ACTIVITY CHANGE: 0

## 2022-07-14 NOTE — ED NOTES
[facility]  ED Nurse Handoff Report    Zayra Ramirez is a 52 year old female   ED Chief complaint: Shoulder Pain  . ED Diagnosis:   Final diagnoses:   Primary osteoarthritis of right knee     Allergies:   Allergies   Allergen Reactions     Bee Venom Anaphylaxis     Bees      Doxycycline Anaphylaxis     Patient thinks it may have been just nausea and vomiting, however unable to confirm     Erythromycin Anaphylaxis and Shortness Of Breath     Other reaction(s): Vomiting     Hydrocodone-Acetaminophen Itching       Code Status: Full Code  Activity level - Baseline/Home:  Independent. Activity Level - Current:   Assist X 1. Lift room needed: No. Bariatric: No   Needed: No   Isolation: No. Infection: Not Applicable.     Vital Signs:   Vitals:    07/13/22 1714   BP: 132/80   Pulse: 98   Resp: 16   Temp: 97.3  F (36.3  C)   TempSrc: Temporal   SpO2: 96%       Cardiac Rhythm:  ,      Pain level:    Patient confused: No. Patient Falls Risk: Yes.   Elimination Status: Has voided   Patient Report - Initial Complaint: Pt here with c/o R shoulder pain after working in yard yesterday. States she has rotator cuff vs RA issues. CMS intact. ABC intact. . Focused Assessment: resting   Tests Performed:   XR Shoulder Right G/E 3 Views   Final Result   IMPRESSION: Anatomic alignment right shoulder. No acute displaced right shoulder fracture. Mild-moderate right acromioclavicular joint arthrosis. Glenohumeral joint space is not profiled. Visualized right lung grossly clear. Degenerative change    visualized spine partially seen. Postop change lower cervical spine.       MR Shoulder Right w/o & w Contrast    (Results Pending)     . Abnormal Results:   Labs Ordered and Resulted from Time of ED Arrival to Time of ED Departure   BASIC METABOLIC PANEL - Abnormal       Result Value    Sodium 137      Potassium 2.7 (*)     Chloride 101      Carbon Dioxide (CO2) 30      Anion Gap 6      Urea Nitrogen 16      Creatinine  0.78      Calcium 8.9      Glucose 99      GFR Estimate >90     CRP INFLAMMATION - Abnormal    CRP Inflammation 73.5 (*)    CBC WITH PLATELETS AND DIFFERENTIAL - Abnormal    WBC Count 16.5 (*)     RBC Count 4.14      Hemoglobin 13.1      Hematocrit 39.7      MCV 96      MCH 31.6      MCHC 33.0      RDW 14.4      Platelet Count 338      % Neutrophils 85      % Lymphocytes 9      % Monocytes 5      % Eosinophils 1      % Basophils 0      % Immature Granulocytes 0      NRBCs per 100 WBC 0      Absolute Neutrophils 14.1 (*)     Absolute Lymphocytes 1.4      Absolute Monocytes 0.8      Absolute Eosinophils 0.1      Absolute Basophils 0.1      Absolute Immature Granulocytes 0.1      Absolute NRBCs 0.0     ERYTHROCYTE SEDIMENTATION RATE AUTO - Normal    Erythrocyte Sedimentation Rate 27     MAGNESIUM - Normal    Magnesium 2.0     CRYSTAL ID SYNOVIAL FLUID   COVID-19 VIRUS (CORONAVIRUS) BY PCR   CELL COUNT BODY FLUID   GRAM STAIN   AEROBIC BACTERIAL CULTURE ROUTINE   BLOOD CULTURE   BLOOD CULTURE   CELL COUNT WITH DIFFERENTIAL FLUID     .   Treatments provided: see mar  Family Comments: not present  OBS brochure/video discussed/provided to patient:  Yes  ED Medications:   Medications   lidocaine 1 % 5 mL (has no administration in time range)   cefTRIAXone (ROCEPHIN) 1 g vial to attach to  mL bag for ADULTS or NS 50 mL bag for PEDS (1 g Intravenous New Bag 7/13/22 2244)   vancomycin (VANCOCIN) 2,000 mg in sodium chloride 0.9 % 500 mL intermittent infusion (has no administration in time range)   0.9% sodium chloride BOLUS (0 mLs Intravenous Stopped 7/13/22 2102)   ketorolac (TORADOL) injection 15 mg (15 mg Intravenous Given 7/13/22 1907)   morphine (PF) injection 4 mg (4 mg Intravenous Given 7/13/22 1908)   acetaminophen (TYLENOL) tablet 1,000 mg (1,000 mg Oral Given 7/13/22 1906)   potassium chloride 10 mEq in 100 mL sterile water intermittent infusion (premix) (0 mEq Intravenous Stopped 7/13/22 2102)   potassium  chloride ER (KLOR-CON M) CR tablet 60 mEq (60 mEq Oral Given 7/13/22 1957)   LORazepam (ATIVAN) tablet 1 mg (1 mg Oral Given 7/13/22 2248)     Drips infusing:  Yes  For the majority of the shift, the patient's behavior Green. Interventions performed were see mar.    Sepsis treatment initiated: No     Patient tested for COVID 19 prior to admission: YES    ED Nurse Name/Phone Number: Philip Negrete RN,   10:50 PM    RECEIVING UNIT ED HANDOFF REVIEW    Above ED Nurse Handoff Report was reviewed: Yes  Reviewed by: Liliana Nicholson RN on July 13, 2022 at 11:47 PM

## 2022-07-14 NOTE — PHARMACY-VANCOMYCIN DOSING SERVICE
"Pharmacy Vancomycin Initial Note  Date of Service 2022  Patient's  1970  52 year old, female    Indication: Bone and Joint Infection    Current estimated CrCl = Estimated Creatinine Clearance: 98.2 mL/min (based on SCr of 0.78 mg/dL).    Creatinine for last 3 days  2022:  7:00 PM Creatinine 0.78 mg/dL    Recent Vancomycin Level(s) for last 3 days  No results found for requested labs within last 72 hours.      Vancomycin IV Administrations (past 72 hours)                   vancomycin (VANCOCIN) 2,000 mg in sodium chloride 0.9 % 500 mL intermittent infusion (mg) 2,000 mg New Bag 22 0014                Nephrotoxins and other renal medications (From now, onward)    Start     Dose/Rate Route Frequency Ordered Stop    22 0600  piperacillin-tazobactam (ZOSYN) infusion 3.375 g        Note to Pharmacy: For [facility], [facility] and [facility]: For Zosyn-naive patients, use the \"Zosyn initial dose + extended infusion\" order panel.    3.375 g  100 mL/hr over 30 Minutes Intravenous EVERY 6 HOURS 22 0025            Contrast Orders - past 72 hours (72h ago, onward)    Start     Dose/Rate Route Frequency Stop    22 2305  gadobutrol (GADAVIST) injection 10 mL         10 mL Intravenous ONCE 22 2306          [employer] Prediction of Planned Initial Vancomycin Regimen   Loading dose: 2000mg at 0015 -  Regimen: 1250 mg IV every 12 hours.  Start time: 1200 on 2022  Exposure target: AUC24 (range)400-600 mg/L.hr   AUC24,ss: 508 mg/L.hr  Probability of AUC24 > 400: 74 %  Ctrough,ss: 16.0 mg/L  Probability of Ctrough,ss > 20: 32 %  Probability of nephrotoxicity (Lodise JASON ): 11 %           Plan:  1. Start vancomycin 1250 mg IV q12h.   2. Vancomycin monitoring method: AUC  3. Vancomycin therapeutic monitoring goal: 400-600 mg*h/L  4. Pharmacy will check vancomycin levels as appropriate in 1-3 Days.    5. Serum creatinine levels will be ordered daily for the first week of therapy and at least twice " weekly for subsequent weeks.      Josie Duarte, [facility]

## 2022-07-14 NOTE — PROVIDER NOTIFICATION
"Paged Dr Martinez with the following message: \"pt would like her inhaler ordered please. Thank you\"  "

## 2022-07-14 NOTE — H&P
[facility]    History and Physical  Hospitalist       Date of Admission:  7/13/2022    Assessment & Plan   Zayra Ramirez is a 52 year old female with a past medical history of obesity, anxiety/depression, RLS, ROBERT, rheumatoid arthritis followed by rheumatology with chronic shoulder pain who presents with worsening right shoulder pain.    #Right shoulder pain, concern for worsening inflammatory arthritis vs. Septic arthritis: Patient notes that she has chronic bilateral shoulder pain for which she is seen by both orthopedics and rheumatology.  Rheumatology does treat her for rheumatoid arthritis for which she is on methotrexate.  This is a relatively new medication for her.  Her rheumatologist thinks that her bilateral chronic shoulder pain is mostly due to rotator cuff issues.  She follows with orthopedics for this.  She had noticed sudden worsening of her right shoulder pain on the evening of 7/12.  Earlier in the day she had been doing gardening which she does on a normal basis.  When she went to bed she had pain with any movement of her right shoulder.  This continued into the night and today.  She does not think it gotten any worse but it just never got better.  She denies any chest pain.  No shortness of breath.  She denies any fevers or chills.  No trauma or injury to the shoulder.  -ER, patient afebrile and nontachycardic.  Normotensive.  CBC with leukocytosis to 16.5.  CRP elevated at 73.  BMP did show hypokalemia to 2.7.  Pt given prn pain meds, potassium replacement. MR shoulder ordered but not yet completed.  Case was discussed with orthopedics who recommended admission and they will evaluate the patient in the a.m.  -ER physician attempted tap of the shoulder but got minimal fluid and not enough for cell count.  -Case was discussed with orthopedics by ER physician, started on antibiotics.  N.p.o. at midnight.  As needed pain medications available.  IV fluids.  -Follow up MRI  results of right shoulder.   -We will continue vancomycin and Zosyn for now.  -Follow inflammatory markers and culture results.    #Hypokalemia: Replacement protocol.     #History of RA, chronic pain: Follows with Dr. Saenz from [facility].  Holding methotrexate for now given concern for infection as above.  Continue home Cymbalta, folic acid, Lyrica.  Prn pain meds as above.  #Anxiety/depression: Continue home meds once verified.  It appears she is on Abilify, BuSpar, Cymbalta  #RLS: Continue home requip  #Obesity on CPAP w 2-3L O2 bleed: Continue home CPAP with O2 bleed.    DVT Prophylaxis: Pneumatic Compression Devices  Code Status: Full Code  Dispo: Admit to inpatient    Patrick Wilson MD    Primary Care Physician   Neena Tam    Chief Complaint   Shoulder Pain    History is obtained from the patient, patient's chart discussed with ER physician    History of Present Illness   Zayra Ramirez is a 52 year old female with a past medical history of obesity, ROBERT, rheumatoid arthritis followed by rheumatology with chronic shoulder pain who presents with worsening right shoulder pain.    Patient notes that she has chronic bilateral shoulder pain for which she is seen by both orthopedics and rheumatology.  Rheumatology does treat her for rheumatoid arthritis for which she is on methotrexate.  This is a relatively new medication for her.  Her rheumatologist thinks that her bilateral chronic shoulder pain is mostly due to rotator cuff issues.  She follows with orthopedics for this.  She had noticed sudden worsening of her right shoulder pain on the evening of 7/12.  Earlier in the day she had been doing gardening which she does on a normal basis.  When she went to bed she had pain with any movement of her right shoulder.  This continued into the night and today.  She does not think it gotten any worse but it just never got better.  She denies any chest pain.  No shortness of breath.  She denies any  fevers or chills.  No trauma or injury to the shoulder.    In the ER, patient afebrile and nontachycardic.  Normotensive.  CBC with leukocytosis to 16.5.  CRP elevated at 73.  BMP did show hypokalemia to 2.7.  Pt given prn pain meds, potassium replacement. MR shoulder ordered but not yet completed.  Case was discussed with orthopedics who recommended admission and they will evaluate the patient in the a.m.    Past Medical History    I have reviewed this patient's medical history and updated it with pertinent information if needed.   Past Medical History:   Diagnosis Date     Allergic rhinitis      Anemia      Arthritis      Asthma     copd     Dental abscess 8-2015     Depressive disorder      Gastroesophageal reflux disease      History of emphysema      Hoarseness      Hypertension      Morbid obesity with BMI of 40.0-44.9, adult (H)      Obstructive sleep apnea      Other chronic pain      Respiratory bronchiolitis interstitial lung disease (H)      Sleep apnea        Past Surgical History   I have reviewed this patient's surgical history and updated it with pertinent information if needed.  Past Surgical History:   Procedure Laterality Date     CERVICAL FUSION       COLONOSCOPY       COLONOSCOPY N/A 02/06/2020    Procedure: COLONOSCOPY, WITH POLYPECTOMY AND BIOPSY;  Surgeon: Julian Mccullough MD;  Location:  GI     ENT SURGERY       ESOPHAGOSCOPY, GASTROSCOPY, DUODENOSCOPY (EGD), COMBINED N/A 02/06/2020    Procedure: ESOPHAGOGASTRODUODENOSCOPY (EGD);  Surgeon: Julian Mccullough MD;  Location:  GI     EXCISE LESION INTRAORAL Bilateral 10/03/2018    Procedure: EXCISE LESION INTRAORAL;  Wide Local Excision Of of Left Oral Cavity Ulcer;  Surgeon: Morro Mijares MD;  Location:  OR      DRAIN SKIN ABSCESS SIMPLE/SINGLE  03/16/2012    Procedure:INCISION AND DRAINAGE, ABSCESS, SIMPLE; Surgeon:CHRISTIANO HANCOCK; Location: GI     HEAD & NECK SURGERY       HYSTERECTOMY       HYSTERECTOMY        INCISION AND DRAINAGE ABDOMEN WASHOUT, COMBINED       INJECT EPIDURAL CERVICAL N/A 10/14/2021    Procedure: Cervical 7- Thoracic 1 epidural steroid injection with fluoroscopy;  Surgeon: Tram Florian MD;  Location: [facility] OR     INJECT EPIDURAL LUMBAR Right 09/15/2020    Procedure: Lumbar5- sacral 1 epidural steroid injection with fluoroscopy;  Surgeon: Tram Florian MD;  [facility]     INJECT EPIDURAL LUMBAR Right 06/29/2021    Procedure: Lumbar 5 sacral 1 epidural steroid injection with fluoroscopy;  Surgeon: Tram Florian MD;  Location: [facility] OR     INJECT SACROILIAC JOINT Bilateral 06/16/2020    Procedure: Bilateral sacroiliac joint steroid injection with fluoroscopy;  Surgeon: Tram Florian MD;  [facility]: [facility] OR     LAMINECTOMY THORACIC ONE LEVEL N/A 08/19/2019    Procedure: LAMINECTOMY, SPINE, THORACIC, 11-12 and Part of Lumbar 1, DRAINAGE OF EPIDURAL ABCESS, Epidural Drain Placement X 2;  Surgeon: Yadiel Beal MD;  Location: [facility] OR     TX PERCUT IMPLNT NEUROELECT,EPIDURAL N/A 08/08/2019    Procedure: TRIAL, SPINAL CORD STIMULATOR WITH [employer];  Surgeon: Sipple, Daniel Peter, DO;  Location: [facility];  Service: Pain     RADIO FREQUENCY ABLATION / DESTRUCTION OF SACROILOAC JOINT DORSAL PRIMARY RAMUS Bilateral 12/17/2019    Procedure: Bilateral lumbar radiofrequency ablation with fluoroscopy and intravenous sedation ( Lumbar 2,3,4,5 medial branch nerves for the bilateral lumbar3-4, 4-5 and 5-sacral1 joints.;  Surgeon: Tram Florian MD;  Location: [facility] OR     RADIO FREQUENCY ABLATION / DESTRUCTION OF SACROILOAC JOINT DORSAL PRIMARY RAMUS Right 11/17/2020    Procedure: Right lumbar medial branch nerve radiofrequency ablation right L2,3,4,5 nerves supplying the right L3-4, L4-5 and L5-S1 facet joints;  Surgeon: Tram Florian MD;  [place] [facility] [place]     spinal cord stimulator  08/08/2019     spinal cord stimulator removal  08/13/2019       Prior to Admission Medications   Prior to  Admission Medications   Prescriptions Last Dose Informant Patient Reported? Taking?   ARIPiprazole (ABILIFY) 5 MG tablet   Yes No   Sig: TAKE 1/2 TABLET BY MOUTH EVERY MORNING FOR 3 DAYS THEN 1 TABLET BY MOUTH EVERY MORNING   DULoxetine (CYMBALTA) 60 MG capsule  Self Yes No   Sig: Take 120 mg by mouth At Bedtime    EPINEPHrine (EPIPEN/ADRENACLICK/OR ANY BX GENERIC EQUIV) 0.3 MG/0.3ML injection 2-pack   Yes No   OXYGEN-HELIUM IN   Yes No   Si-3L PRN during the day, 3L @ night    Oxygen only not helium   Respiratory Therapy Supplies (On license of [facility] CPAP FILTER) MISC   Yes No   albuterol (PROAIR HFA, PROVENTIL HFA, VENTOLIN HFA) 108 (90 BASE) MCG/ACT inhaler  Self Yes No   Sig: Inhale 2 puffs into the lungs every 6 hours as needed for shortness of breath / dyspnea or wheezing (PT last dose 2020)    busPIRone HCl (BUSPAR) 30 MG tablet   Yes No   Sig: Take 30 mg by mouth 2 times daily    carboxymethylcellulose (CARBOXYMETHYLCELLULOSE SODIUM) 0.5 % SOLN ophthalmic solution   No No   Sig: Place 1 drop into both eyes 3 times daily as needed for dry eyes   celecoxib (CELEBREX) 200 MG capsule   No No   Sig: Take 1 capsule (200 mg) by mouth every morning   cetirizine (ZYRTEC) 10 MG tablet   No No   Sig: Take 1 tablet (10 mg) by mouth daily   cholecalciferol 50 MCG (2000 UT) CAPS   Yes No   Sig: Take 2,000 Units by mouth every evening **20,000-30,000 units daily started taking this doseage approx 22**   cyclobenzaprine (FLEXERIL) 10 MG tablet   No No   Sig: Take 1 tablet (10 mg) by mouth At Bedtime please keep appt 22.   folic acid (FOLVITE) 1 MG tablet   No No   Sig: Take 1 tablet (1 mg) by mouth daily   methotrexate sodium 2.5 MG TABS   No No   Sig: Take 9 tablets (22.5 mg) by mouth once a week   methylPREDNISolone (MEDROL) 4 MG tablet   No No   Sig: Take 3 tabs daily for 10 days, then 2 tabs daily for 10 days, then 1 tab daily for 10 days   montelukast (SINGULAIR) 10 MG tablet  Self Yes No   Sig: Take  10 mg by mouth At Bedtime   nystatin (MYCOSTATIN) 762864 UNIT/GM external cream   No No   Sig: Apply topically 2 times daily   omeprazole (PRILOSEC) 40 MG DR capsule   No No   Sig: Take 1 capsule (40 mg) by mouth daily   Patient taking differently: Take 40 mg by mouth every evening   oxyCODONE-acetaminophen (PERCOCET) 5-325 MG tablet   No No   Si tab every 8 hours as needed for pain until 2022. Maximum 3 tabs a day for shoulder pain.   pregabalin (LYRICA) 150 MG capsule   No No   Sig: Take 1 capsule (150 mg) by mouth 2 times daily   rOPINIRole (REQUIP) 0.5 MG tablet   No No   Sig: Take 1 tablet (0.5 mg) by mouth At Bedtime Take 1 tab by mouth at bedtime.   vitamin C 500 MG TABS   Yes No   Sig: Take 500 mg by mouth daily (with lunch)    zinc sulfate (ZINCATE) 220 (50 Zn) MG capsule   Yes No   Sig: Take 220 mg by mouth daily (with lunch)       Facility-Administered Medications Last Administration Doses Remaining   lidocaine (PF) (XYLOCAINE) 1 % injection 5 mL None recorded 1   ropivacaine (NAROPIN) injection 1 mL 3/29/2021  2:24 PM    triamcinolone (KENALOG-40) injection 40 mg 3/29/2021  2:24 PM    triamcinolone (KENALOG-40) injection 40 mg None recorded 1        Allergies   Allergies   Allergen Reactions     Bee Venom Anaphylaxis     Bees      Doxycycline Anaphylaxis     Patient thinks it may have been just nausea and vomiting, however unable to confirm     Erythromycin Anaphylaxis and Shortness Of Breath     Other reaction(s): Vomiting     Hydrocodone-Acetaminophen Itching       Social History   I have reviewed this patient's social history and updated it with pertinent information if needed. Zayra Ramirez  reports that she quit smoking about 7 months ago. Her smoking use included cigarettes. She started smoking about 26 years ago. She has a 30.00 pack-year smoking history. She has never used smokeless tobacco. She reports current drug use. Drug: Marijuana. She reports that she does not drink  alcohol.    Family History   I have reviewed this patient's family history and updated it with pertinent information if needed.   Family History   Problem Relation Age of Onset     Asthma Mother      Restless Leg Syndrome Mother      Other Cancer Father         base of tongue cancer at age ~65     Hypertension Father      Back Pain Father      Restless Leg Syndrome Father      Coronary Artery Disease Father      Restless Leg Syndrome Sister      Breast Cancer Maternal Aunt 55     Anesthesia Reaction No family hx of      Deep Vein Thrombosis (DVT) No family hx of        Review of Systems   The 10 point Review of Systems is negative other than noted in the HPI or here.     Physical Exam   Temp: 97.3  F (36.3  C) Temp src: Temporal BP: 132/80 Pulse: 98   Resp: 16 SpO2: 96 %      Vital Signs with Ranges  Temp:  [97.3  F (36.3  C)] 97.3  F (36.3  C)  Pulse:  [98] 98  Resp:  [16] 16  BP: (132)/(80) 132/80  SpO2:  [96 %] 96 %  0 lbs 0 oz    Constitutional: Chronically deconditioned, no acute distress.  Answers appropriately.  HEENT: Normocephalic, MMM, no elevation of JVD noted  Respiratory: Nl WOB, Clear bilaterally, No wheezes, no crackles  Cardiovascular: Regular, no murmur  GI: Obese, BS+, NT, ND, no rebound or guarding  Lymph/Hematologic: No bruising. No cervical LAD  Skin: No rash  Musculoskeletal: Patient with full active range of motion of the left shoulder.  No point tenderness.  Patient with pain with any passive range of motion of the right shoulder joint.  Sensation intact distally.  No point tenderness along the shoulder joint.  No significant swelling noted.  No erythema or warmth overlying the shoulder.  Neurologic: A&Ox3, Answers appropriately. CNII-XII intact. Moves all extremities. No tremor  Psychiatric: Calm    Data   Data reviewed today:  I personally reviewed   Recent Labs   Lab 07/13/22  1900 07/07/22  0846   WBC 16.5*  --    HGB 13.1  --    MCV 96  --      --     140   POTASSIUM 2.7*  "3.7   CHLORIDE 101 104   CO2 30 31   BUN 16 20   CR 0.78 0.92   ANIONGAP 6 5   NATALIYA 8.9 8.7   GLC 99 93   ALBUMIN  --  3.4   PROTTOTAL  --  8.0   BILITOTAL  --  0.4   ALKPHOS  --  97   ALT  --  33   AST  --  6       Recent Results (from the past 24 hour(s))   XR Shoulder Right G/E 3 Views    Narrative    EXAM: XR SHOULDER RIGHT G/E 3 VIEWS  LOCATION: [facility]  DATE/TIME: 7/13/2022 7:20 PM    INDICATION: Right shoulder pain.  COMPARISON: None available.      Impression    IMPRESSION: Anatomic alignment right shoulder. No acute displaced right shoulder fracture. Mild-moderate right acromioclavicular joint arthrosis. Glenohumeral joint space is not profiled. Visualized right lung grossly clear. Degenerative change   visualized spine partially seen. Postop change lower cervical spine.        Clinically Significant Risk Factors Present on Admission        # Hypokalemia: K = 2.7 mmol/L (Ref range: 3.4 - 5.3 mmol/L) on admission, will replace as needed             # Obesity: Estimated body mass index is 38.09 kg/m  as calculated from the following:    Height as of 6/30/22: 1.619 m (5' 3.75\").    Weight as of 6/30/22: 99.9 kg (220 lb 3.2 oz).           "

## 2022-07-14 NOTE — PROGRESS NOTES
[facility]    Hospitalist Progress Note      Assessment & Plan   Zayra Ramirez is a 52 year old female who was admitted on 7/13/2022.    Summary of Stay:     Zayra Ramirez is a 52 year old female with a past medical history of obesity, anxiety/depression, RLS, ROBERT, rheumatoid arthritis followed by rheumatology with chronic shoulder pain who presents with worsening right shoulder pain.    Patient notes that she has chronic bilateral shoulder pain for which she is seen by both orthopedics and rheumatology.  Rheumatology does treat her for rheumatoid arthritis for which she is on methotrexate.  This is a relatively new medication for her.  Her rheumatologist thinks that her bilateral chronic shoulder pain is mostly due to rotator cuff issues.  She follows with orthopedics for this.      She had noticed sudden worsening of her right shoulder pain on the evening of 7/12. Pain with little movement of the joint.    Noted to have leukocytosis with elevated inflammatory markers.  Afebrile in the emergency room.    Shoulder joint aspiration was attempted by ER physician and small amount of fluid was removed.  It was sent to the lab for further evaluation: Which showed CPPD crystals.  Negative gram stain.  Cultures pending.  Blood cultures also sent.  Started on IV antibiotic treatment.  MRI of the joint was ordered.    Plan:    Right shoulder pain.  Acute on chronic.  - Currently the main differential is crystal arthropathy versus infection.  - MRI of the shoulder is suggestive of infection.  But there is CPP crystals on joint aspiration.  - Continue antibiotic treatment.  - Orthopedic consult is pending.  Discussed with Dr. Smith: shoulder specialist is on call tomorrow so they will likely defer treatment for tomorrow. Formal consult to follow.  --scheduled ibuprofen     Hypokalemia  - Replacement per protocol.    History of rheumatoid arthritis.  - Holding methotrexate.  Follows up with   Dany  - Continue Cymbalta, folic acid, Lyrica    Anxiety/depression  - Abilify, BuSpar, Cymbalta    Restless leg syndrome  - On Requip    ROBERT  --CPAP      DVT Prophylaxis: Pneumatic Compression Devices  Code Status: Full Code  Expected discharge: 2-3 days    Kobi Martinez MD  Text Page (7am - 6pm, M-F)    Interval History   Patient was evaluated with nursing staff. Overnight issues discussed.    Review of systems:  No nausea or vomiting.  No abdominal pain.  No diarrhea.  No chest pain/palpitations.  No new cough/shortness of breath.  No headache/visual disturbance/new weakness.  She was resting comfortably when I saw her.  Main issue is ongoing right shoulder pain.    -Data reviewed today: Labs and medications.    Physical Exam   Temp: 98.2  F (36.8  C) Temp src: Temporal BP: 137/72 Pulse: 85   Resp: 16 SpO2: 94 % O2 Device: None (Room air)    Vitals:    07/14/22 0030   Weight: 99.5 kg (219 lb 4.8 oz)     Vital Signs with Ranges  Temp:  [97.3  F (36.3  C)-99.8  F (37.7  C)] 98.2  F (36.8  C)  Pulse:  [67-98] 85  Resp:  [12-16] 16  BP: (108-140)/(71-96) 137/72  FiO2 (%):  [3 %] 3 %  SpO2:  [89 %-98 %] 94 %  No intake/output data recorded.    Constitutional: Awake, alert, cooperative, no apparent distress  HEENT: Trachea midline, sclera is clear   Respiratory: No crackles. No wheezing. Equal breath sounds bilaterally.  Cardiovascular: Regular rate and rhythm, normal S1 and S2, and no murmur noted  GI: Normal bowel sounds, soft, non-distended, non-tender  Extremities: Difficulty moving the right shoulder.  Pain with abduction attempt.    Medications     sodium chloride 100 mL/hr at 07/14/22 1246       acetaminophen  975 mg Oral Q8H     ARIPiprazole  5 mg Oral Daily     busPIRone HCl  30 mg Oral BID     cyclobenzaprine  10 mg Oral At Bedtime     DULoxetine  120 mg Oral At Bedtime     folic acid  1 mg Oral Daily     ibuprofen  600 mg Oral TID     montelukast  10 mg Oral At Bedtime     piperacillin-tazobactam  3.375 g  Intravenous Q6H     pregabalin  150 mg Oral BID     rOPINIRole  0.5 mg Oral At Bedtime     sodium chloride (PF)  3 mL Intracatheter Q8H     vancomycin  1,250 mg Intravenous Q12H       Data   Recent Labs   Lab 07/14/22  0729 07/14/22  0109 07/13/22  1900   WBC 10.1  --  16.5*   HGB 12.2  --  13.1   MCV 96  --  96     --  338     --  137   POTASSIUM 3.0* 3.5 2.7*   CHLORIDE 107  --  101   CO2 28  --  30   BUN 14  --  16   CR 0.80  --  0.78   ANIONGAP 4  --  6   NATALIYA 8.2*  --  8.9   *  --  99       Recent Results (from the past 24 hour(s))   XR Shoulder Right G/E 3 Views    Narrative    EXAM: XR SHOULDER RIGHT G/E 3 VIEWS  LOCATION: [facility]  DATE/TIME: 7/13/2022 7:20 PM    INDICATION: Right shoulder pain.  COMPARISON: None available.      Impression    IMPRESSION: Anatomic alignment right shoulder. No acute displaced right shoulder fracture. Mild-moderate right acromioclavicular joint arthrosis. Glenohumeral joint space is not profiled. Visualized right lung grossly clear. Degenerative change   visualized spine partially seen. Postop change lower cervical spine.    MR Shoulder Right w/o & w Contrast    Narrative    EXAM: MR SHOULDER RIGHT W/O and W CONTRAST  LOCATION: [facility]  DATE/TIME: 7/13/2022 11:04 PM    INDICATION: severe pain, concern for septic arthritis  COMPARISON: None.  TECHNIQUE: Routine. Additional postgadolinium T1 sequences were obtained.  IV CONTRAST: 10mL Gadavist    FINDINGS:    ROTATOR CUFF:  -Supraspinatus: There is thinning of the supraspinatus tendon with swelling and edema and increased enhancement, a fluid collection extends into the belly of the supraspinatus muscle dorsal to it, which may communicate with the AC joint bursa.  -Infraspinatus: Mild intrinsic tendinosis of the infraspinatus tendon is noted near its humeral attachment.  -Subscapularis: No tendon tear, tendinopathy or fatty atrophy.  -Teres minor: No tendon  tear, tendinopathy or fatty atrophy.      ACROMIOCLAVICULAR JOINT:   -There is bone marrow edema and enhancement of the distal clavicle and acromion with a large AC joint effusion no erosive osseous changes are seen at this time.  This enhancement and swelling of the overlying trapezius muscle and subcutaneous tissues.   (Image 18, series 7)    LONG HEAD OF BICEPS TENDON:   -No tendinopathy or tear. No tenosynovitis or subluxation.    GLENOHUMERAL JOINT:   -Labrum: No labral tear. No paralabral cyst.   -Cartilage: Suboptimally evaluated secondary to synovial inflammation. No large chondral defects are noted.   -Joint space: There is a moderate joint effusion with marked synovial inflammation, most pronounced on the post contrast imaging.The effusion is seen within the axillary pouch as well as extending into the subcoracoid recess and posterior to the   glenohumeral joint space.  -Glenohumeral ligaments and capsule: Suboptimally evaluated due to the extensive synovial and paracapsular inflammation.    BONES:   -The humeral head and included humeral neck and diaphysis are intact with no abnormal marrow signal. The glenoid is intact with no abnormal marrow signal. On postcontrast imaging no worrisome marrow findings are seen.    SOFT TISSUES:   -Swelling and edema as well as marked enhancement are seen within the deltoid muscle anteriorly.      Impression    IMPRESSION:  1.  Findings concerning for septic arthritis and early osteomyelitis centered at the AC joint with adjacent myositis of the overlying trapezius muscle and cellulitis of the adjacent subcutaneous tissues.  2.  Synovial enhancement and joint effusion of the glenohumeral joint which may reflect early changes of a septic joint without evidence of osteomyelitis at this time.   3.  Thickening and enhancement of the supraspinatus tendon with intramuscular edema small intramuscular fluid collections as well as fluid extending dorsally,, concerning for  myositis/tendinitis secondary to septic arthritis  4.  Edema and enhancement of the deltoid muscle anterior to the shoulder, concerning for infectious myositis

## 2022-07-14 NOTE — PROGRESS NOTES
Ortho Brief - full consult in AM    Zayra Ramirez is a 52 year old female with a past medical history of obesity, anxiety/depression, RLS, ROBERT, rheumatoid arthritis followed by rheumatology with chronic shoulder pain who presents with worsening right shoulder pain.    MR done  Aspiration: psedogout  Negative gram stain     Adding procalcitonin, indomethacin, PPI, and making NPO p MN (done)    Rec:  Treatment of gout  Look for improvement  NPO p MN, if failing to improve I&D with Dr. Nancy Smith MD

## 2022-07-14 NOTE — PLAN OF CARE
Goal Outcome Evaluation:    Patient vital signs are at baseline: Yes  Patient able to ambulate as they were prior to admission or with assist devices provided by therapies during their stay:  Yes  Patient MUST void prior to discharge:  Yes  Patient able to tolerate oral intake:  No,  Reason:  NPO for Ortho consult  Pain has adequate pain control using Oral analgesics:  Yes  Does patient have an identified :  Yes  Has goal D/C date and time been discussed with patient:  No,  Reason:  work in progress, awaiting Ortho consult    On IV Zosyn and IV Vanco

## 2022-07-14 NOTE — PLAN OF CARE
"Patient vital signs are at baseline: Yes  Patient able to ambulate as they were prior to admission or with assist devices provided by therapies during their stay:  Yes  Patient MUST void prior to discharge:  Yes  Patient able to tolerate oral intake:  Yes  Pain has adequate pain control using Oral analgesics:  Yes  Does patient have an identified :  Yes  Has goal D/C date and time been discussed with patient:  No,  Reason:  admit late    /71 (BP Location: Right arm)   Pulse 67   Temp 98.6  F (37  C) (Temporal)   Resp 12   Ht 1.63 m (5' 4.17\")   Wt 99.5 kg (219 lb 4.8 oz)   LMP  (LMP Unknown)   SpO2 98%   BMI 37.44 kg/m       Pt is A&O, up with SBA. NPO except meds. VSS, on 3 LPM O2 via nasal cannula. K replacement protocol, K- 3.5. PIV infusing NS at 100 mL/hr. Orthopedic surgery following. Will continue to monitor and provide supportive measures.   "

## 2022-07-14 NOTE — PHARMACY-ADMISSION MEDICATION HISTORY
Admission medication history interview status for this patient is complete. See [facility] admission navigator for allergy information, prior to admission medications and immunization status.     Medication history interview done, indicate source(s): Patient  Medication history resources (including written lists, pill bottles, clinic record):None  Pharmacy: [facility])    Changes made to PTA medication list:  Added: none  Changed: aripiprazole (updated to 5 mg daily), cholecalciferol (2000 units ---> 10,000 units), methotrexate (22.5 mg weekly --> 25 mg weekly),    Reported as Not Taking: none  Removed: methylprednisolone, nystatin, oxycodone/APAP, vitamin C    Actions taken by pharmacist (provider contacted, etc):None   Additional medication history information:None  Medication reconciliation/reorder completed by provider prior to medication history? no    Prior to Admission medications    Medication Sig Last Dose Taking? Auth Provider Long Term End Date   albuterol (PROAIR HFA, PROVENTIL HFA, VENTOLIN HFA) 108 (90 BASE) MCG/ACT inhaler Inhale 2 puffs into the lungs every 6 hours as needed for shortness of breath / dyspnea or wheezing (PT last dose 1.23.2020)  Past Month at Unknown time Yes Reported, Patient Yes    ARIPiprazole (ABILIFY) 5 MG tablet Take 5 mg by mouth daily 7/13/2022 at am Yes Unknown, Entered By History     busPIRone HCl (BUSPAR) 30 MG tablet Take 30 mg by mouth 2 times daily  7/13/2022 at x 1 Yes Reported, Patient Yes    carboxymethylcellulose (CARBOXYMETHYLCELLULOSE SODIUM) 0.5 % SOLN ophthalmic solution Place 1 drop into both eyes 3 times daily as needed for dry eyes More than a month at Unknown time Yes Leodan Gan MD     celecoxib (CELEBREX) 200 MG capsule Take 1 capsule (200 mg) by mouth every morning 7/13/2022 at am Yes Panchito Saenz MD Yes    cetirizine (ZYRTEC) 10 MG tablet Take 1 tablet (10 mg) by mouth daily 7/13/2022 at am Yes Neena Tam APRN CNP      DULoxetine (CYMBALTA) 60 MG capsule Take 120 mg by mouth At Bedtime  7/12/2022 at hs Yes Unknown, Entered By History No    EPINEPHrine (EPIPEN/ADRENACLICK/OR ANY BX GENERIC EQUIV) 0.3 MG/0.3ML injection 2-pack  More than a month at Unknown time Yes Reported, Patient     folic acid (FOLVITE) 1 MG tablet Take 1 tablet (1 mg) by mouth daily 7/13/2022 at am Yes Panchito Saenz MD     pregabalin (LYRICA) 150 MG capsule Take 1 capsule (150 mg) by mouth 2 times daily 7/13/2022 at x 1 Yes Goltz, Bennett Ezra, PA-C Yes    vitamin D3 (CHOLECALCIFEROL) 250 mcg (31238 units) capsule Take 1 capsule by mouth daily 7/13/2022 at am Yes Unknown, Entered By History     cyclobenzaprine (FLEXERIL) 10 MG tablet Take 1 tablet (10 mg) by mouth At Bedtime please keep appt 6/30/22. 7/12/2022 at hs  Dulce Cortez APRN CNP     methotrexate sodium 2.5 MG TABS Take 9 tablets (22.5 mg) by mouth once a week  Patient taking differently: Take 25 mg by mouth once a week MONDAYS 7/11/2022 at am  Panchito Saenz MD Yes    montelukast (SINGULAIR) 10 MG tablet Take 10 mg by mouth At Bedtime 7/12/2022 at hs  Unknown, Entered By History Yes    omeprazole (PRILOSEC) 40 MG DR capsule Take 1 capsule (40 mg) by mouth daily  Patient taking differently: Take 40 mg by mouth every evening 7/12/2022 at pm  Abhi Villafuerte PA-C     rOPINIRole (REQUIP) 0.5 MG tablet Take 1 tablet (0.5 mg) by mouth At Bedtime Take 1 tab by mouth at bedtime.   Goltz, Bennett Ezra, PA-C Yes    zinc sulfate (ZINCATE) 220 (50 Zn) MG capsule Take 220 mg by mouth daily (with lunch)  7/12/2022 at Shara Garcia APRN CNP

## 2022-07-15 VITALS
OXYGEN SATURATION: 93 % | BODY MASS INDEX: 38.17 KG/M2 | RESPIRATION RATE: 18 BRPM | HEIGHT: 64 IN | TEMPERATURE: 97.6 F | SYSTOLIC BLOOD PRESSURE: 161 MMHG | WEIGHT: 223.6 LBS | HEART RATE: 65 BPM | DIASTOLIC BLOOD PRESSURE: 101 MMHG

## 2022-07-15 LAB
CREAT SERPL-MCNC: 0.78 MG/DL (ref 0.52–1.04)
CRP SERPL-MCNC: 122 MG/L (ref 0–8)
ERYTHROCYTE [DISTWIDTH] IN BLOOD BY AUTOMATED COUNT: 14.2 % (ref 10–15)
GFR SERPL CREATININE-BSD FRML MDRD: >90 ML/MIN/1.73M2
HCT VFR BLD AUTO: 37 % (ref 35–47)
HGB BLD-MCNC: 12 G/DL (ref 11.7–15.7)
MCH RBC QN AUTO: 31.1 PG (ref 26.5–33)
MCHC RBC AUTO-ENTMCNC: 32.4 G/DL (ref 31.5–36.5)
MCV RBC AUTO: 96 FL (ref 78–100)
PLATELET # BLD AUTO: 308 10E3/UL (ref 150–450)
POTASSIUM BLD-SCNC: 4.4 MMOL/L (ref 3.4–5.3)
RBC # BLD AUTO: 3.86 10E6/UL (ref 3.8–5.2)
VANCOMYCIN SERPL-MCNC: 24.3 MG/L
WBC # BLD AUTO: 7.2 10E3/UL (ref 4–11)

## 2022-07-15 PROCEDURE — 250N000011 HC RX IP 250 OP 636: Performed by: HOSPITALIST

## 2022-07-15 PROCEDURE — 250N000013 HC RX MED GY IP 250 OP 250 PS 637: Performed by: ORTHOPAEDIC SURGERY

## 2022-07-15 PROCEDURE — 250N000013 HC RX MED GY IP 250 OP 250 PS 637: Performed by: INTERNAL MEDICINE

## 2022-07-15 PROCEDURE — 85014 HEMATOCRIT: CPT | Performed by: INTERNAL MEDICINE

## 2022-07-15 PROCEDURE — 80202 ASSAY OF VANCOMYCIN: CPT | Performed by: HOSPITALIST

## 2022-07-15 PROCEDURE — 250N000011 HC RX IP 250 OP 636: Performed by: PHYSICIAN ASSISTANT

## 2022-07-15 PROCEDURE — 86140 C-REACTIVE PROTEIN: CPT | Performed by: HOSPITALIST

## 2022-07-15 PROCEDURE — 250N000013 HC RX MED GY IP 250 OP 250 PS 637: Performed by: HOSPITALIST

## 2022-07-15 PROCEDURE — 3E0U33Z INTRODUCTION OF ANTI-INFLAMMATORY INTO JOINTS, PERCUTANEOUS APPROACH: ICD-10-PCS | Performed by: ORTHOPAEDIC SURGERY

## 2022-07-15 PROCEDURE — 84132 ASSAY OF SERUM POTASSIUM: CPT | Performed by: INTERNAL MEDICINE

## 2022-07-15 PROCEDURE — 250N000009 HC RX 250: Performed by: PHYSICIAN ASSISTANT

## 2022-07-15 PROCEDURE — 36415 COLL VENOUS BLD VENIPUNCTURE: CPT | Performed by: HOSPITALIST

## 2022-07-15 PROCEDURE — 82565 ASSAY OF CREATININE: CPT | Performed by: HOSPITALIST

## 2022-07-15 PROCEDURE — 258N000003 HC RX IP 258 OP 636: Performed by: HOSPITALIST

## 2022-07-15 PROCEDURE — 99239 HOSP IP/OBS DSCHRG MGMT >30: CPT | Performed by: INTERNAL MEDICINE

## 2022-07-15 RX ORDER — LIDOCAINE 40 MG/G
CREAM TOPICAL
Status: CANCELLED | OUTPATIENT
Start: 2022-07-15

## 2022-07-15 RX ORDER — INDOMETHACIN 25 MG/1
75 CAPSULE ORAL 2 TIMES DAILY WITH MEALS
Qty: 180 CAPSULE | Refills: 0 | Status: SHIPPED | OUTPATIENT
Start: 2022-07-15 | End: 2022-07-26

## 2022-07-15 RX ORDER — LIDOCAINE HYDROCHLORIDE 10 MG/ML
5 INJECTION, SOLUTION EPIDURAL; INFILTRATION; INTRACAUDAL; PERINEURAL ONCE
Status: COMPLETED | OUTPATIENT
Start: 2022-07-15 | End: 2022-07-15

## 2022-07-15 RX ORDER — TRIAMCINOLONE ACETONIDE 40 MG/ML
40 INJECTION, SUSPENSION INTRA-ARTICULAR; INTRAMUSCULAR ONCE
Status: COMPLETED | OUTPATIENT
Start: 2022-07-15 | End: 2022-07-15

## 2022-07-15 RX ORDER — SODIUM CHLORIDE, SODIUM LACTATE, POTASSIUM CHLORIDE, CALCIUM CHLORIDE 600; 310; 30; 20 MG/100ML; MG/100ML; MG/100ML; MG/100ML
INJECTION, SOLUTION INTRAVENOUS CONTINUOUS
Status: CANCELLED | OUTPATIENT
Start: 2022-07-15

## 2022-07-15 RX ADMIN — LIDOCAINE HYDROCHLORIDE 3 ML: 10 INJECTION, SOLUTION EPIDURAL; INFILTRATION; INTRACAUDAL; PERINEURAL at 10:31

## 2022-07-15 RX ADMIN — IBUPROFEN 600 MG: 600 TABLET, FILM COATED ORAL at 10:30

## 2022-07-15 RX ADMIN — BUSPIRONE HYDROCHLORIDE 30 MG: 15 TABLET ORAL at 09:22

## 2022-07-15 RX ADMIN — VANCOMYCIN HYDROCHLORIDE 1250 MG: 10 INJECTION, POWDER, LYOPHILIZED, FOR SOLUTION INTRAVENOUS at 01:24

## 2022-07-15 RX ADMIN — INDOMETHACIN 75 MG: 25 CAPSULE ORAL at 10:29

## 2022-07-15 RX ADMIN — SODIUM CHLORIDE: 9 INJECTION, SOLUTION INTRAVENOUS at 00:15

## 2022-07-15 RX ADMIN — Medication 40 MG: at 09:23

## 2022-07-15 RX ADMIN — PREGABALIN 150 MG: 75 CAPSULE ORAL at 09:22

## 2022-07-15 RX ADMIN — OXYCODONE HYDROCHLORIDE 5 MG: 5 TABLET ORAL at 05:44

## 2022-07-15 RX ADMIN — FOLIC ACID 1 MG: 1 TABLET ORAL at 09:22

## 2022-07-15 RX ADMIN — TAZOBACTAM SODIUM AND PIPERACILLIN SODIUM 3.38 G: 375; 3 INJECTION, SOLUTION INTRAVENOUS at 00:15

## 2022-07-15 RX ADMIN — TRIAMCINOLONE ACETONIDE 40 MG: 40 INJECTION, SUSPENSION INTRA-ARTICULAR; INTRAMUSCULAR at 10:32

## 2022-07-15 RX ADMIN — TAZOBACTAM SODIUM AND PIPERACILLIN SODIUM 3.38 G: 375; 3 INJECTION, SOLUTION INTRAVENOUS at 07:47

## 2022-07-15 RX ADMIN — ACETAMINOPHEN 975 MG: 325 TABLET, FILM COATED ORAL at 05:44

## 2022-07-15 ASSESSMENT — ACTIVITIES OF DAILY LIVING (ADL)
ADLS_ACUITY_SCORE: 22
ADLS_ACUITY_SCORE: 26
ADLS_ACUITY_SCORE: 26
ADLS_ACUITY_SCORE: 22
ADLS_ACUITY_SCORE: 26
ADLS_ACUITY_SCORE: 22
ADLS_ACUITY_SCORE: 26

## 2022-07-15 NOTE — PLAN OF CARE
Goal Outcome Evaluation:    Plan of Care Reviewed With: patient     Overall Patient Progress: no change    Patient is continent of bowel and bladder, ambulates independently, saline locked, on RA and is NPO at 00:00 for an I&D at 07:40 am. Pain is managed with Tylenol, Ibuprofen, and Oxy. Chlorhexidine bath completed this evening.

## 2022-07-15 NOTE — DISCHARGE INSTRUCTIONS
You may follow up with your orthopedist or with Dr. Cruz if pain persists for further evaluations/injections. You may call Dr. Cruz's care coordinator Idalmis at [phone] to schedule appointment if needed.

## 2022-07-15 NOTE — PLAN OF CARE
Pt discharge to home. Discharge instructions reviewed with pt. Pt understood all instructions, meds, F/U appts needed and s/s of when to call ortho md. All belongings taken with pt. 1 med sent to pt's pharmacy. Pain rating 2-4/10.     Plan of Care Reviewed With: patient

## 2022-07-15 NOTE — CONSULTS
[facility]    Orthopedic Consultation    Zayra Ramirez MRN# [id number]   Age: 52 year old YOB: 1970     Date of Admission:  7/13/2022    Reason for consult: R shoulder pain       Requesting physician: Steve       Level of consult: One-time consult to assist in determining a diagnosis and to recommend an appropriate treatment plan           Assessment and Plan:   Assessment:   R shoulder pseudogout  Rheumatoid arthritis  Low concern for infection this am      Plan:   - R shoulder steroid injection today  - continue indomethacin  - ice to R shoulder  - stop abx   - follow cultures  - PT/OT for R shoulder  - follow up with her rheumatologist after discharge for RA care  - can follow up her own orthopedist or with me if pain persists for further evaluations/injections. Can call my care coordinator Idalmis at [phone] to schedule appointment.           Chief Complaint:   R shoulder pain         History of Present Illness:   This patient is a 52 year old female who presents with the following condition requiring a hospital admission:    53 yo F with history of RA who has had months of bilateral shoulder pain R worse than L. She states that she is followed by rheumatology. She has noted sudden worsening in the R shoulder in terms of pain on 7/12. She states that it was bad enough that she was unable to move her arm. She presented to the ED where she had significantly elevated wbc, crp, and esr. An MRI of the shoulder was cocnerning for ?infection. An aspiration was performed that demonstrated pseudogout. Procalcitonin levels were normal. She was started on indomethacin yesterday which she states helped significantly and her arm is now at her baseline. She is able to move the arm without much pain and she feels like she did prior to Tuesday with her baseline pain. No fevers or chills.          Past Medical History:     Past Medical History:   Diagnosis Date     Allergic rhinitis       Anemia      Arthritis      Asthma     copd     Dental abscess 8-2015     Depressive disorder      Gastroesophageal reflux disease      History of emphysema      Hoarseness      Hypertension      Morbid obesity with BMI of 40.0-44.9, adult (H)      Obstructive sleep apnea      Other chronic pain      Respiratory bronchiolitis interstitial lung disease (H)      Sleep apnea              Past Surgical History:     Past Surgical History:   Procedure Laterality Date     CERVICAL FUSION       COLONOSCOPY       COLONOSCOPY N/A 02/06/2020    Procedure: COLONOSCOPY, WITH POLYPECTOMY AND BIOPSY;  Surgeon: Julian Mccullough MD;  Location:  GI     ENT SURGERY       ESOPHAGOSCOPY, GASTROSCOPY, DUODENOSCOPY (EGD), COMBINED N/A 02/06/2020    Procedure: ESOPHAGOGASTRODUODENOSCOPY (EGD);  Surgeon: Julian Mccullough MD;  Location:  GI     EXCISE LESION INTRAORAL Bilateral 10/03/2018    Procedure: EXCISE LESION INTRAORAL;  Wide Local Excision Of of Left Oral Cavity Ulcer;  Surgeon: Morro Mijares MD;  Location: [facility] OR     HC DRAIN SKIN ABSCESS SIMPLE/SINGLE  03/16/2012    Procedure:INCISION AND DRAINAGE, ABSCESS, SIMPLE; Surgeon:CHRISTIANO HANCOCK; Location: GI     HEAD & NECK SURGERY       HYSTERECTOMY       HYSTERECTOMY       INCISION AND DRAINAGE ABDOMEN WASHOUT, COMBINED       INJECT EPIDURAL CERVICAL N/A 10/14/2021    Procedure: Cervical 7- Thoracic 1 epidural steroid injection with fluoroscopy;  Surgeon: Tram Florian MD;  Location: [facility] OR     INJECT EPIDURAL LUMBAR Right 09/15/2020    Procedure: Lumbar5- sacral 1 epidural steroid injection with fluoroscopy;  Surgeon: Tram Florian MD;  [facility]     INJECT EPIDURAL LUMBAR Right 06/29/2021    Procedure: Lumbar 5 sacral 1 epidural steroid injection with fluoroscopy;  Surgeon: Tram Florian MD;  Location: [facility] OR     INJECT SACROILIAC JOINT Bilateral 06/16/2020    Procedure: Bilateral sacroiliac joint steroid injection with fluoroscopy;   Surgeon: Tram Florian MD;  [facility] OR     LAMINECTOMY THORACIC ONE LEVEL N/A 2019    Procedure: LAMINECTOMY, SPINE, THORACIC, 11-12 and Part of Lumbar 1, DRAINAGE OF EPIDURAL ABCESS, Epidural Drain Placement X 2;  Surgeon: Yadiel Beal MD;  Location: [facility] OR     OR PERCUT IMPLNT NEUROELECT,EPIDURAL N/A 2019    Procedure: TRIAL, SPINAL CORD STIMULATOR WITH [employer];  Surgeon: Sipple, Daniel Peter, DO;  Location: [facility];  Service: Pain     RADIO FREQUENCY ABLATION / DESTRUCTION OF SACROILOAC JOINT DORSAL PRIMARY RAMUS Bilateral 2019    Procedure: Bilateral lumbar radiofrequency ablation with fluoroscopy and intravenous sedation ( Lumbar 2,3,4,5 medial branch nerves for the bilateral lumbar3-4, 4-5 and 5-sacral1 joints.;  Surgeon: Tram Florian MD;  Location:  OR     RADIO FREQUENCY ABLATION / DESTRUCTION OF SACROILOAC JOINT DORSAL PRIMARY RAMUS Right 2020    Procedure: Right lumbar medial branch nerve radiofrequency ablation right L2,3,4,5 nerves supplying the right L3-4, L4-5 and L5-S1 facet joints;  Surgeon: Tram Florian MD;  Location: [facility] [place]     spinal cord stimulator  2019     spinal cord stimulator removal  2019             Social History:     Social History     Tobacco Use     Smoking status: Former Smoker     Packs/day: 1.00     Years: 30.00     Pack years: 30.00     Types: Cigarettes     Start date: 1996     Quit date: 2021     Years since quittin.6     Smokeless tobacco: Never Used   Substance Use Topics     Alcohol use: No             Family History:     Family History   Problem Relation Age of Onset     Asthma Mother      Restless Leg Syndrome Mother      Other Cancer Father         base of tongue cancer at age ~65     Hypertension Father      Back Pain Father      Restless Leg Syndrome Father      Coronary Artery Disease Father      Restless Leg Syndrome Sister      Breast Cancer Maternal Aunt 55     Anesthesia  Reaction No family hx of      Deep Vein Thrombosis (DVT) No family hx of              Immunizations:     VACCINE/DOSE   Diptheria   DPT   DTAP   HBIG   Hepatitis A   Hepatitis B   HIB   Influenza   Measles   Meningococcal   MMR   Mumps   Pneumococcal   Polio   Rubella   Small Pox   TDAP   Varicella   Zoster             Allergies:     Allergies   Allergen Reactions     Bee Venom Anaphylaxis     Bees      Doxycycline Anaphylaxis     Patient thinks it may have been just nausea and vomiting, however unable to confirm     Erythromycin Anaphylaxis and Shortness Of Breath     Other reaction(s): Vomiting     Hydrocodone-Acetaminophen Itching             Medications:     Current Facility-Administered Medications   Medication     acetaminophen (TYLENOL) tablet 975 mg     albuterol (PROVENTIL HFA/VENTOLIN HFA) inhaler     ARIPiprazole (ABILIFY) tablet 5 mg     busPIRone (BUSPAR) tablet 30 mg     cyclobenzaprine (FLEXERIL) tablet 10 mg     DULoxetine (CYMBALTA) DR capsule 120 mg     folic acid (FOLVITE) tablet 1 mg     HYDROmorphone (DILAUDID) injection 0.2 mg     ibuprofen (ADVIL/MOTRIN) tablet 600 mg     indomethacin (INDOCIN) capsule 75 mg     lidocaine (LMX4) cream     lidocaine (PF) (XYLOCAINE) 1 % injection 5 mL     lidocaine 1 % 0.1-1 mL     melatonin tablet 1 mg     montelukast (SINGULAIR) tablet 10 mg     naloxone (NARCAN) injection 0.2 mg    Or     naloxone (NARCAN) injection 0.4 mg    Or     naloxone (NARCAN) injection 0.2 mg    Or     naloxone (NARCAN) injection 0.4 mg     ondansetron (ZOFRAN ODT) ODT tab 4 mg    Or     ondansetron (ZOFRAN) injection 4 mg     oxyCODONE (ROXICODONE) tablet 5 mg     pantoprazole (PROTONIX) 2 mg/mL suspension 40 mg     piperacillin-tazobactam (ZOSYN) infusion 3.375 g     pregabalin (LYRICA) capsule 150 mg     rOPINIRole (REQUIP) tablet 0.5 mg     sodium chloride (PF) 0.9% PF flush 3 mL     sodium chloride (PF) 0.9% PF flush 3 mL     sodium chloride 0.9% infusion     triamcinolone  (KENALOG-40) injection 40 mg     vancomycin 1250 mg in 0.9% NaCl 250 mL intermittent infusion 1,250 mg     Facility-Administered Medications Ordered in Other Encounters   Medication     Lidocaine 1 % injection 0.5-5 mL     sodium chloride (PF) 0.9% PF flush 5-50 mL             Review of Systems:   All review of systems was performed and was negative except as per hpi            Physical Exam:   All vitals have been reviewed  Patient Vitals for the past 24 hrs:   BP Temp Temp src Pulse Resp SpO2 Weight   07/15/22 0757 (!) 161/101 97.6  F (36.4  C) Temporal 65 18 93 % --   07/15/22 0610 -- -- -- -- -- -- 101.4 kg (223 lb 9.6 oz)   07/15/22 0544 -- -- -- -- 18 -- --   07/15/22 0016 (!) 148/88 97.6  F (36.4  C) Axillary 79 16 94 % --   07/14/22 1535 (!) 142/83 98.5  F (36.9  C) Temporal 73 16 93 % --   07/14/22 0940 -- -- -- -- -- 94 % --       Intake/Output Summary (Last 24 hours) at 7/15/2022 0854  Last data filed at 7/14/2022 2027  Gross per 24 hour   Intake 360 ml   Output --   Net 360 ml     NAD  nonlabored breathing  No peripheral edema  Skin intact  White sclera  RUE:  +ttp over anterior, lateral, and posterior shoulder  Painless ROM of the shoulder  , ER 30  5/5 strength with external and supraspinatus testing.  Skin intact            Data:   All laboratory data reviewed  Results for orders placed or performed during the hospital encounter of 07/13/22   Arthrocentesis     Status: None    Narrative    Jenifer Baird MD     7/14/2022  8:57 PM  Arthrocentesis    Date/Time: 7/14/2022 8:43 PM  Performed by: Jenifer Baird MD  Authorized by: Jenifer Baird MD     Risks, benefits and alternatives discussed.      LOCATION:   Location:  Shoulder  Shoulder:  R glenohumeral  ANESTHESIA (see MAR for exact dosages):   Anesthesia method:  Local infiltration  Local anesthetic:  Lidocaine 1% WITH epi      PROCEDURE DETAILS:     Needle gauge:  18 G    Ultrasound guidance: yes      Approach:  Anterior    Aspirate  amount:  1.5 cc    Aspirate characteristics:  Blood-tinged    Steroid injected: no      Specimen collected: yes      Dressing: adhesive bandage      PROCEDURE  Describe Procedure: 2 attempts at aspiration with only 1.5 ml fluid   collected in total.   Patient Tolerance:  Patient tolerated the procedure well with no immediate   complications   XR Shoulder Right G/E 3 Views     Status: None    Narrative    EXAM: XR SHOULDER RIGHT G/E 3 VIEWS  LOCATION: [facility]  DATE/TIME: 7/13/2022 7:20 PM    INDICATION: Right shoulder pain.  COMPARISON: None available.      Impression    IMPRESSION: Anatomic alignment right shoulder. No acute displaced right shoulder fracture. Mild-moderate right acromioclavicular joint arthrosis. Glenohumeral joint space is not profiled. Visualized right lung grossly clear. Degenerative change   visualized spine partially seen. Postop change lower cervical spine.    MR Shoulder Right w/o & w Contrast     Status: None    Narrative    EXAM: MR SHOULDER RIGHT W/O and W CONTRAST  LOCATION: [facility]  DATE/TIME: 7/13/2022 11:04 PM    INDICATION: severe pain, concern for septic arthritis  COMPARISON: None.  TECHNIQUE: Routine. Additional postgadolinium T1 sequences were obtained.  IV CONTRAST: 10mL Gadavist    FINDINGS:    ROTATOR CUFF:  -Supraspinatus: There is thinning of the supraspinatus tendon with swelling and edema and increased enhancement, a fluid collection extends into the belly of the supraspinatus muscle dorsal to it, which may communicate with the AC joint bursa.  -Infraspinatus: Mild intrinsic tendinosis of the infraspinatus tendon is noted near its humeral attachment.  -Subscapularis: No tendon tear, tendinopathy or fatty atrophy.  -Teres minor: No tendon tear, tendinopathy or fatty atrophy.      ACROMIOCLAVICULAR JOINT:   -There is bone marrow edema and enhancement of the distal clavicle and acromion with a large AC joint effusion no  erosive osseous changes are seen at this time.  This enhancement and swelling of the overlying trapezius muscle and subcutaneous tissues.   (Image 18, series 7)    LONG HEAD OF BICEPS TENDON:   -No tendinopathy or tear. No tenosynovitis or subluxation.    GLENOHUMERAL JOINT:   -Labrum: No labral tear. No paralabral cyst.   -Cartilage: Suboptimally evaluated secondary to synovial inflammation. No large chondral defects are noted.   -Joint space: There is a moderate joint effusion with marked synovial inflammation, most pronounced on the post contrast imaging.The effusion is seen within the axillary pouch as well as extending into the subcoracoid recess and posterior to the   glenohumeral joint space.  -Glenohumeral ligaments and capsule: Suboptimally evaluated due to the extensive synovial and paracapsular inflammation.    BONES:   -The humeral head and included humeral neck and diaphysis are intact with no abnormal marrow signal. The glenoid is intact with no abnormal marrow signal. On postcontrast imaging no worrisome marrow findings are seen.    SOFT TISSUES:   -Swelling and edema as well as marked enhancement are seen within the deltoid muscle anteriorly.      Impression    IMPRESSION:  1.  Findings concerning for septic arthritis and early osteomyelitis centered at the AC joint with adjacent myositis of the overlying trapezius muscle and cellulitis of the adjacent subcutaneous tissues.  2.  Synovial enhancement and joint effusion of the glenohumeral joint which may reflect early changes of a septic joint without evidence of osteomyelitis at this time.   3.  Thickening and enhancement of the supraspinatus tendon with intramuscular edema small intramuscular fluid collections as well as fluid extending dorsally,, concerning for myositis/tendinitis secondary to septic arthritis  4.  Edema and enhancement of the deltoid muscle anterior to the shoulder, concerning for infectious myositis   Basic metabolic panel      Status: Abnormal   Result Value Ref Range    Sodium 137 133 - 144 mmol/L    Potassium 2.7 (L) 3.4 - 5.3 mmol/L    Chloride 101 94 - 109 mmol/L    Carbon Dioxide (CO2) 30 20 - 32 mmol/L    Anion Gap 6 3 - 14 mmol/L    Urea Nitrogen 16 7 - 30 mg/dL    Creatinine 0.78 0.52 - 1.04 mg/dL    Calcium 8.9 8.5 - 10.1 mg/dL    Glucose 99 70 - 99 mg/dL    GFR Estimate >90 >60 mL/min/1.73m2   Erythrocyte sedimentation rate auto     Status: Normal   Result Value Ref Range    Erythrocyte Sedimentation Rate 27 0 - 30 mm/hr   CRP inflammation     Status: Abnormal   Result Value Ref Range    CRP Inflammation 73.5 (H) 0.0 - 8.0 mg/L   CBC with platelets and differential     Status: Abnormal   Result Value Ref Range    WBC Count 16.5 (H) 4.0 - 11.0 10e3/uL    RBC Count 4.14 3.80 - 5.20 10e6/uL    Hemoglobin 13.1 11.7 - 15.7 g/dL    Hematocrit 39.7 35.0 - 47.0 %    MCV 96 78 - 100 fL    MCH 31.6 26.5 - 33.0 pg    MCHC 33.0 31.5 - 36.5 g/dL    RDW 14.4 10.0 - 15.0 %    Platelet Count 338 150 - 450 10e3/uL    % Neutrophils 85 %    % Lymphocytes 9 %    % Monocytes 5 %    % Eosinophils 1 %    % Basophils 0 %    % Immature Granulocytes 0 %    NRBCs per 100 WBC 0 <1 /100    Absolute Neutrophils 14.1 (H) 1.6 - 8.3 10e3/uL    Absolute Lymphocytes 1.4 0.8 - 5.3 10e3/uL    Absolute Monocytes 0.8 0.0 - 1.3 10e3/uL    Absolute Eosinophils 0.1 0.0 - 0.7 10e3/uL    Absolute Basophils 0.1 0.0 - 0.2 10e3/uL    Absolute Immature Granulocytes 0.1 <=0.4 10e3/uL    Absolute NRBCs 0.0 10e3/uL   [facility] Draw     Status: None    Narrative    The following orders were created for panel order [facility] Draw.  Procedure                               Abnormality         Status                     ---------                               -----------         ------                     Extra Blue Top Tube[[id number]]                              Final result                 Please view results for these tests on the individual orders.   Extra Blue Top Tube     Status:  None   Result Value Ref Range    Hold Specimen [facility]    Magnesium     Status: Normal   Result Value Ref Range    Magnesium 2.0 1.6 - 2.3 mg/dL   Crystal ID Synovial Fluid     Status: Abnormal   Result Value Ref Range    Crystals Analysis (A) No clinically significant crystals seen.     Positive for intracellular crystals, consistent with calcium pyrophosphate dihydrate crystals.   Asymptomatic COVID-19 Virus (Coronavirus) by PCR Nasopharyngeal     Status: Normal    Specimen: Nasopharyngeal; Swab   Result Value Ref Range    SARS CoV2 PCR Negative Negative    Narrative    Testing was performed using the [employer] X[employer] SARS-CoV-2 Assay on the   Cepheid Gene-Xpert Instrument Systems. Additional information about   this Emergency Use Authorization (EUA) assay can be found via the Lab   Guide. This test should be ordered for the detection of SARS-CoV-2 in   individuals who meet SARS-CoV-2 clinical and/or epidemiological   criteria. Test performance is unknown in asymptomatic patients. This   test is for in vitro diagnostic use under the FDA EUA for   laboratories certified under CLIA to perform high complexity testing.   This test has not been FDA cleared or approved. A negative result   does not rule out the presence of PCR inhibitors in the specimen or   target RNA in concentration below the limit of detection for the   assay. The possibility of a false negative should be considered if   the patient's recent exposure or clinical presentation suggests   COVID-19. This test was validated by the [facility]. This laboratory is certified under the Clinical Laboratory Improvement Amendments of 1988 (CLIA-88) as qualified to perform high complexity laboratory testing.     Cell Count Body Fluid     Status: Abnormal   Result Value Ref Range    Color Red (A) Colorless, Yellow    Clarity Cloudy (A) Clear, Bloody    Cell Count Fluid Source Right Shoulder     Narrative    No reference ranges have been  established.  This result  should be interpreted in the context of the patient's clinical condition and   compared to simultaneous measurement in the patient's blood.         Differential Body Fluid     Status: None   Result Value Ref Range    % Neutrophils      % Lymphocytes      % Monocyte/Macrophages      Narrative    Specimen clotted. Slide reviewed, no abnormal cells seen.  No reference ranges have been established. This result should be interpreted in the context of the patient's clinical condition and compared to simultaneous measurement in the patient's blood.   Basic metabolic panel     Status: Abnormal   Result Value Ref Range    Sodium 139 133 - 144 mmol/L    Potassium 3.0 (L) 3.4 - 5.3 mmol/L    Chloride 107 94 - 109 mmol/L    Carbon Dioxide (CO2) 28 20 - 32 mmol/L    Anion Gap 4 3 - 14 mmol/L    Urea Nitrogen 14 7 - 30 mg/dL    Creatinine 0.80 0.52 - 1.04 mg/dL    Calcium 8.2 (L) 8.5 - 10.1 mg/dL    Glucose 101 (H) 70 - 99 mg/dL    GFR Estimate 89 >60 mL/min/1.73m2   CRP inflammation     Status: Abnormal   Result Value Ref Range    CRP Inflammation 101.0 (H) 0.0 - 8.0 mg/L   CBC with platelets     Status: Normal   Result Value Ref Range    WBC Count 10.1 4.0 - 11.0 10e3/uL    RBC Count 3.85 3.80 - 5.20 10e6/uL    Hemoglobin 12.2 11.7 - 15.7 g/dL    Hematocrit 36.8 35.0 - 47.0 %    MCV 96 78 - 100 fL    MCH 31.7 26.5 - 33.0 pg    MCHC 33.2 31.5 - 36.5 g/dL    RDW 14.5 10.0 - 15.0 %    Platelet Count 285 150 - 450 10e3/uL   Potassium     Status: Normal   Result Value Ref Range    Potassium 3.5 3.4 - 5.3 mmol/L   Potassium     Status: Normal   Result Value Ref Range    Potassium 3.8 3.4 - 5.3 mmol/L   Procalcitonin     Status: Normal   Result Value Ref Range    Procalcitonin <0.05 <0.05 ng/mL   CRP inflammation     Status: Abnormal   Result Value Ref Range    CRP Inflammation 122.0 (H) 0.0 - 8.0 mg/L   Creatinine     Status: Normal   Result Value Ref Range    Creatinine 0.78 0.52 - 1.04 mg/dL    GFR  Estimate >90 >60 mL/min/1.73m2   Potassium     Status: Normal   Result Value Ref Range    Potassium 4.4 3.4 - 5.3 mmol/L   CBC with platelets     Status: Normal   Result Value Ref Range    WBC Count 7.2 4.0 - 11.0 10e3/uL    RBC Count 3.86 3.80 - 5.20 10e6/uL    Hemoglobin 12.0 11.7 - 15.7 g/dL    Hematocrit 37.0 35.0 - 47.0 %    MCV 96 78 - 100 fL    MCH 31.1 26.5 - 33.0 pg    MCHC 32.4 31.5 - 36.5 g/dL    RDW 14.2 10.0 - 15.0 %    Platelet Count 308 150 - 450 10e3/uL   Vancomycin level     Status: Normal   Result Value Ref Range    Vancomycin 24.3   mg/L   Gram stain     Status: None    Specimen: Shoulder, Right; Synovial fluid   Result Value Ref Range    Gram Stain Result No organisms seen     Gram Stain Result 4+ WBC seen     Gram Stain Result     Synovial fluid Aerobic Bacterial Culture Routine     Status: None (Preliminary result)    Specimen: Shoulder, Right; Synovial fluid   Result Value Ref Range    Culture No growth after 1 day    Blood Culture Peripheral Blood     Status: Normal (Preliminary result)    Specimen: Peripheral Blood   Result Value Ref Range    Culture No growth after 1 day    Blood Culture Hand, Right     Status: Normal (Preliminary result)    Specimen: Hand, Right; Blood   Result Value Ref Range    Culture No growth after 1 day    CBC with platelets differential     Status: Abnormal    Narrative    The following orders were created for panel order CBC with platelets differential.  Procedure                               Abnormality         Status                     ---------                               -----------         ------                     CBC with platelets and d...[[id number]]  Abnormal            Final result                 Please view results for these tests on the individual orders.   Cell count with differential fluid *Canceled*     Status: None ()    Narrative    The following orders were created for panel order Cell count with differential fluid.  Procedure                                Abnormality         Status                     ---------                               -----------         ------                     Cell Count Body Fluid[[id number]]                                                         Please view results for these tests on the individual orders.   Cell count with differential fluid     Status: Abnormal    Narrative    The following orders were created for panel order Cell count with differential fluid.  Procedure                               Abnormality         Status                     ---------                               -----------         ------                     Cell Count Body Fluid[[id number]]        Abnormal            Final result               Differential Body Fluid[[id number]]                          Final result                 Please view results for these tests on the individual orders.          Attestation:  Amount of time performed on this consult: 20 minutes.    Diaz Cruz MD

## 2022-07-15 NOTE — PROCEDURES
Procedure: Right shoulder subacromial injection    The skin was prepped in usual sterile fashion. 3 CCs of 1% Lidocaine w/o Epi and 40 mg of Kenalog were injected into the subacromial space. Band-aid was applied.     The patient tolerated the procedure well. There were no complications. She can continue to use the arm as tolerated. She has no restrictions. She was counseled to avoid any heavy lifting that exacerbates her pain. She will continue to do her physical therapy and follow up with orthopedics as an outpatient.     Lidia Saenz PA-C on 7/15/2022 at 10:23 AM   Orthopedics

## 2022-07-15 NOTE — PLAN OF CARE
A/O VSS pain 6/10 oxycodone for pain control.  Up indp voiding NPO for surgery today. Red cracked dry sore buttocks mepilex dressing applied.

## 2022-07-16 ENCOUNTER — PATIENT OUTREACH (OUTPATIENT)
Dept: CARE COORDINATION | Facility: CLINIC | Age: 52
End: 2022-07-16

## 2022-07-16 DIAGNOSIS — Z71.89 OTHER SPECIFIED COUNSELING: ICD-10-CM

## 2022-07-16 NOTE — PROGRESS NOTES
Clinic Care Coordination Contact  [facility]: Post-Discharge Note  SITUATION                                                      Admission:    Admission Date: 07/13/22   Reason for Admission: Acute pain of right shoulder  Discharge:   Discharge Date: 07/15/22  Discharge Diagnosis: Right shoulder pain, concern for worsening inflammatory arthritis vs. Septic arthritis    BACKGROUND                                                      Per hospital discharge summary and inpatient provider notes:    Zayra Ramirez is a 52 year old female with a past medical history of obesity, ROBERT, rheumatoid arthritis followed by rheumatology with chronic shoulder pain who presents with worsening right shoulder pain.     Patient notes that she has chronic bilateral shoulder pain for which she is seen by both orthopedics and rheumatology.  Rheumatology does treat her for rheumatoid arthritis for which she is on methotrexate.  This is a relatively new medication for her.  Her rheumatologist thinks that her bilateral chronic shoulder pain is mostly due to rotator cuff issues.  She follows with orthopedics for this.  She had noticed sudden worsening of her right shoulder pain on the evening of 7/12.  Earlier in the day she had been doing gardening which she does on a normal basis.  When she went to bed she had pain with any movement of her right shoulder.  This continued into the night and today.  She does not think it gotten any worse but it just never got better.  She denies any chest pain.  No shortness of breath.  She denies any fevers or chills.  No trauma or injury to the shoulder.     In the ER, patient afebrile and nontachycardic.  Normotensive.  CBC with leukocytosis to 16.5.  CRP elevated at 73.  BMP did show hypokalemia to 2.7.  Pt given prn pain meds, potassium replacement. MR shoulder ordered but not yet completed.  Case was discussed with orthopedics who recommended admission and they will evaluate the patient in the  "a.m.    ASSESSMENT      Enrollment  Primary Care Care Coordination Status: Not a Candidate    Discharge Assessment  How are you doing now that you are home?: \"Doing good. Much better than I was when I was there.\"  How are your symptoms? (Red Flag symptoms escalate to triage hotline per guidelines): Improved  Do you feel your condition is stable enough to be safe at home until your provider visit?: Yes  Does the patient have their discharge instructions? : Yes  Does the patient have questions regarding their discharge instructions? : No  Were you started on any new medications or were there changes to any of your previous medications? : Yes  Does the patient have all of their medications?: Yes  Do you have questions regarding any of your medications? : No  Do you have all of your needed medical supplies or equipment (DME)?  (i.e. oxygen tank, CPAP, cane, etc.): Yes  Discharge follow-up appointment scheduled within 14 calendar days? : No (Patient stated she plans to contact her primary care clinic on Monday, 7/18/22 to schedule f/u appts. with her PCP and with Orthopedics.)  Is patient agreeable to assistance with scheduling? : Yes    Post-op ([facility] Only)  If the patient had a surgery or procedure, do they have any questions for a nurse?: No      PLAN                                                      Outpatient Plan:      Follow-up and recommended labs and tests  Follow up with primary care provider, Neena Tam, within 7 days for hospital follow- up.  Follow up with [facility] team.    Future Appointments   Date Time Provider Department Center   7/18/2022  2:45 PM [id number] [facility]   7/20/2022  1:30 PM Danni Rodriguez, PT [facility]   7/26/2022 12:20 PM Dorothy Hong MD [facility]   8/2/2022  2:30 PM Saumya Dobbins RD MDGSMEÑO [facility]   8/18/2022  7:00 AM Dior Arroyo PA-C [facility]   8/31/2022  1:00 PM Yeimy Guerra DO [facility]   9/29/2022  1:40 PM Choco" Philip Chappell MD [facility]         For any urgent concerns, please contact our 24 hour nurse triage line: [phone] ([phone])         ISREAL Avendano  [phone]  [facility]

## 2022-07-17 NOTE — DISCHARGE SUMMARY
[facility]    DISCHARGE SUMMARY    (Hospitalist Service)     Date of Admission:  7/13/2022  Date of Discharge:  7/15/2022  1:38 PM  Discharging Provider: Kobi Martinez MD  Date of Service (when I saw the patient): 7/15/2022      Discharge Diagnoses     Right shoulder pain.  Pseudogout flareup.  Rheumatoid arthritis.  Hypokalemia.  Anxiety/depression.  Restless leg syndrome.  Obstructive sleep apnea.    History of Present Illness     Zayra Ramirez is a 52 year old female with a past medical history of obesity, anxiety/depression, RLS, ROBERT, rheumatoid arthritis followed by rheumatology with chronic shoulder pain who presents with worsening right shoulder pain.     Patient notes that she has chronic bilateral shoulder pain for which she is seen by both orthopedics and rheumatology.  Rheumatology does treat her for rheumatoid arthritis for which she is on methotrexate.  This is a relatively new medication for her.  Her rheumatologist thinks that her bilateral chronic shoulder pain is mostly due to rotator cuff issues.  She follows with orthopedics for this.       She had noticed sudden worsening of her right shoulder pain on the evening of 7/12. Pain with little movement of the joint.     Noted to have leukocytosis with elevated inflammatory markers.  Afebrile in the emergency room.     Shoulder joint aspiration was attempted by ER physician and small amount of fluid was removed.  It was sent to the lab for further evaluation: Which showed CPPD crystals.  Negative gram stain.  Cultures pending.  Blood cultures also sent.  Started on IV antibiotic treatment.      Hospital Course     Right shoulder pain.  Acute on chronic.  -  Initially the main differential is crystal arthropathy versus infection.  - MRI of the shoulder is suggestive of infection.  But there is CPP crystals on joint aspiration.  -  Orthopedic consult obtained.  Patient was initially seen by  Dr. Smith: Recommended continuing  observation given high probability of pseudogout flareup.  - The next day, patient was seen by [facility] shoulder specialist Dr. Cruz: Despite the MRI findings, his opinion was that this is pseudogout flareup.  - For that reason, shoulder steroid injection was given to the patient.  After that there was dramatic improvement in her symptoms.  - Antibiotics have been discontinued.  Cultures remain negative.  - Patient is being discharged on NSAID, indomethacin.  - Follow-up per orthopedic team.     Hypokalemia  - Replacement per protocol.     History of rheumatoid arthritis.  - Methotrexate held at admission but can be resumed now.  Follows up with Dr. Saenz  - Continue Cymbalta, folic acid, Lyrica     Anxiety/depression  - Abilify, BuSpar, Cymbalta     Restless leg syndrome  - On Requip     ROBERT  --CPAP      I personally evaluated and examined the patient on the day of discharge.    Kobi Martinez MD      Pending Results   These results will be followed up by PCP  Unresulted Labs Ordered in the Past 30 Days of this Admission     Date and Time Order Name Status Description    7/13/2022 10:42 PM Blood Culture Hand, Right Preliminary     7/13/2022 10:09 PM Blood Culture Peripheral Blood Preliminary     7/13/2022  8:45 PM Synovial fluid Aerobic Bacterial Culture Routine Preliminary                Primary Care Physician   Neena Tam        Discharge Disposition   Discharged to home  Condition at discharge: Stable    Consultations This Hospital Stay   PHARMACY TO DOSE VANCO  ORTHOPEDIC SURGERY IP CONSULT  PHARMACY TO DOSE VANCO    Time Spent on this Encounter   Discharge time: greater than 30 minutes.    Discharge Orders      Reason for your hospital stay    Pseudogout of right shoulder     Follow-up and recommended labs and tests     Follow up with primary care provider, Neena Tam, within 7 days for hospital follow- up.    Follow up with [facility] team.     Activity    Your activity upon discharge: activity as  tolerated     Discharge Instructions    Hold celebrex while taking Indomethacin.     Diet    Follow this diet upon discharge: Orders Placed This Encounter      Regular Diet Adult     Discharge Medications   Discharge Medication List as of 7/15/2022 12:46 PM      START taking these medications    Details   indomethacin (INDOCIN) 25 MG capsule Take 3 capsules (75 mg) by mouth 2 times daily (with meals) for 30 days, Disp-180 capsule, R-0, E-PrescribeTake scheduled for a week then as needed.         CONTINUE these medications which have NOT CHANGED    Details   albuterol (PROAIR HFA, PROVENTIL HFA, VENTOLIN HFA) 108 (90 BASE) MCG/ACT inhaler Inhale 2 puffs into the lungs every 6 hours as needed for shortness of breath / dyspnea or wheezing (PT last dose 1.23.2020) , Historical      ARIPiprazole (ABILIFY) 5 MG tablet Take 5 mg by mouth daily, Historical      busPIRone HCl (BUSPAR) 30 MG tablet Take 30 mg by mouth 2 times daily , Historical      carboxymethylcellulose (CARBOXYMETHYLCELLULOSE SODIUM) 0.5 % SOLN ophthalmic solution Place 1 drop into both eyes 3 times daily as needed for dry eyes, Disp-15 mL, R-11, E-Prescribe      celecoxib (CELEBREX) 200 MG capsule Take 1 capsule (200 mg) by mouth every morning, Disp-60 capsule, R-4, E-Prescribe      cetirizine (ZYRTEC) 10 MG tablet Take 1 tablet (10 mg) by mouth daily, Disp-90 tablet, R-2, E-Prescribe      DULoxetine (CYMBALTA) 60 MG capsule Take 120 mg by mouth At Bedtime , Historical      EPINEPHrine (EPIPEN/ADRENACLICK/OR ANY BX GENERIC EQUIV) 0.3 MG/0.3ML injection 2-pack Historical      folic acid (FOLVITE) 1 MG tablet Take 1 tablet (1 mg) by mouth daily, Disp-90 tablet, R-3, E-Prescribe      pregabalin (LYRICA) 150 MG capsule Take 1 capsule (150 mg) by mouth 2 times daily, Disp-60 capsule, R-3, E-Prescribe      vitamin D3 (CHOLECALCIFEROL) 250 mcg (73805 units) capsule Take 1 capsule by mouth daily, Historical      cyclobenzaprine (FLEXERIL) 10 MG tablet Take 1  tablet (10 mg) by mouth At Bedtime please keep appt 6/30/22., Disp-90 tablet, R-0, E-Prescribe      methotrexate sodium 2.5 MG TABS Take 9 tablets (22.5 mg) by mouth once a week, Disp-108 tablet, R-2, E-Prescribe      montelukast (SINGULAIR) 10 MG tablet Take 10 mg by mouth At Bedtime, Historical      omeprazole (PRILOSEC) 40 MG DR capsule Take 1 capsule (40 mg) by mouth daily, Disp-180 capsule, R-3, E-Prescribe      OXYGEN-HELIUM IN 2-3L PRN during the day, 3L @ night    Oxygen only not helium, Historical      Respiratory Therapy Supplies ([facility] CPAP FILTER) MIS Historical      rOPINIRole (REQUIP) 0.5 MG tablet Take 1 tablet (0.5 mg) by mouth At Bedtime Take 1 tab by mouth at bedtime., Disp-90 tablet, R-1, E-Prescribe      zinc sulfate (ZINCATE) 220 (50 Zn) MG capsule Take 220 mg by mouth daily (with lunch) , Historical           Allergies   Allergies   Allergen Reactions     Bee Venom Anaphylaxis     Bees      Doxycycline Anaphylaxis     Patient thinks it may have been just nausea and vomiting, however unable to confirm     Erythromycin Anaphylaxis and Shortness Of Breath     Other reaction(s): Vomiting     Hydrocodone-Acetaminophen Itching     Data   Most Recent 3 CBC's:Recent Labs   Lab Test 07/15/22  0620 07/14/22  0729 07/13/22  1900   WBC 7.2 10.1 16.5*   HGB 12.0 12.2 13.1   MCV 96 96 96    285 338      Most Recent 3 BMP's:  Recent Labs   Lab Test 07/15/22  0620 07/14/22  1528 07/14/22  0729 07/14/22  0109 07/13/22  1900 07/07/22  0846   NA  --   --  139  --  137 140   POTASSIUM 4.4 3.8 3.0*   < > 2.7* 3.7   CHLORIDE  --   --  107  --  101 104   CO2  --   --  28  --  30 31   BUN  --   --  14  --  16 20   CR 0.78  --  0.80  --  0.78 0.92   ANIONGAP  --   --  4  --  6 5   NATALIYA  --   --  8.2*  --  8.9 8.7   GLC  --   --  101*  --  99 93    < > = values in this interval not displayed.     Most Recent 2 LFT's:  Recent Labs   Lab Test 07/07/22  0846 02/18/22  0846 11/10/21  1245 08/31/21  1708    AST 6 13   < > 18   ALT 33 28   < > 19   ALKPHOS 97  --   --  76   BILITOTAL 0.4  --   --  0.4    < > = values in this interval not displayed.     Most Recent INR's and Anticoagulation Dosing History:  Anticoagulation Dose History     Recent Dosing and Labs Latest Ref Rng & Units 8/18/2019    INR 0.86 - 1.14 1.09        Most Recent 3 Troponin's:  Recent Labs   Lab Test 06/08/16  1413 06/08/16  1356 06/08/16  1315 06/03/16  2342 06/03/16  1936 06/03/16  1910   TROPI  --   --  <0.015  The 99th percentile for upper reference range is 0.045 ug/L.  Troponin values in   the range of 0.045 - 0.120 ug/L may be associated with risks of adverse   clinical events.   <0.015  The 99th percentile for upper reference range is 0.045 ug/L.  Troponin values in   the range of 0.045 - 0.120 ug/L may be associated with risks of adverse   clinical events.    --  <0.015  The 99th percentile for upper reference range is 0.045 ug/L.  Troponin values in   the range of 0.045 - 0.120 ug/L may be associated with risks of adverse   clinical events.     TROPONIN 0.00 0.00  --   --  0.00  --      Most Recent Cholesterol Panel:  Recent Labs   Lab Test 07/07/22  0846   CHOL 149   LDL 73   HDL 56   TRIG 101     Most Recent 6 Bacteria Isolates From Any Culture (See EPIC Reports for Culture Details):  Recent Labs   Lab Test 04/08/21  1055 08/19/19  1402 08/19/19  1353 08/19/19  0615 08/19/19  0606 08/19/19  0546   CULT Moderate growth  Candida albicans  isolated  * No growth No growth Culture negative after 4 weeks  No anaerobes isolated  Heavy growth  Staphylococcus aureus  Susceptibility testing done on previous specimen  * Culture negative after 4 weeks  No anaerobes isolated  Moderate growth  Staphylococcus aureus  Susceptibility testing done on previous specimen  * Culture negative after 4 weeks  No anaerobes isolated  Heavy growth  Staphylococcus aureus  *     Most Recent TSH, T4 and A1c Labs:  Recent Labs   Lab Test 07/07/22  0846   A1C  5.3

## 2022-07-19 LAB
BACTERIA BLD CULT: NO GROWTH
BACTERIA BLD CULT: NO GROWTH
BACTERIA SNV CULT: NORMAL

## 2022-07-20 ENCOUNTER — THERAPY VISIT (OUTPATIENT)
Dept: PHYSICAL THERAPY | Facility: CLINIC | Age: 52
End: 2022-07-20
Payer: COMMERCIAL

## 2022-07-20 DIAGNOSIS — M54.16 CHRONIC LUMBAR RADICULOPATHY: Primary | ICD-10-CM

## 2022-07-20 PROCEDURE — 97112 NEUROMUSCULAR REEDUCATION: CPT | Mod: GP | Performed by: PHYSICAL THERAPIST

## 2022-07-20 PROCEDURE — 97162 PT EVAL MOD COMPLEX 30 MIN: CPT | Mod: GP | Performed by: PHYSICAL THERAPIST

## 2022-07-20 NOTE — PROGRESS NOTES
[facility] Initial Evaluation -- Lumbar    Date: July 20, 2022  Zayra Ramirez is a 52 year old female with a lumbar condition.   Referral: Dr. Wilhelm  Work mechanical stresses:  /self employed      Employment status:  Part time  Leisure mechanical stresses: unable  Functional disability score (KATIE/STarT Back):  See flow sheet  VAS score (0-10): 6/10  Patient goals/expectations:  Pain relief, strengthening     HISTORY:    Present symptoms: B low back  Pain quality (sharp/shooting/stabbing/aching/burning/cramping):  Aching, burning/numbness   Paresthesia (yes/no):  R upper thigh, L foot    Present since (onset date): chronic since 31 years of age, seen by Dr. Wilhelm 6/24/22 w/referral to PT.     Symptoms (improving/unchanging/worsening):  unchanging.     Symptoms commenced as a result of: chronic   Condition occurred in the following environment:        Symptoms at onset (back/thigh/leg): back, legs  Constant symptoms (back/thigh/leg): back, R thigh NT  Intermittent symptoms (back/thigh/leg): foot NT    Symptoms are made worse with the following: Always Sitting, Always Rising, Always Standing, Always Walking, Always Lying and Time of day - No effect   Symptoms are made better with the following: Sometimes Lying and Other - pain meds    Disturbed sleep (yes/no):  yes Sleeping postures (prone/sup/side R/L): sides    Previous episodes (0/1-5/6-10/11+): 11+ Year of first episode: chronic    Previous history: chronic over 30 years  Previous treatments: PT, injections, RFA,       Specific Questions:  Cough/Sneeze/Strain (pos/neg): positive for bowel movement  Bowel/Bladder (normal/abnormal): normal  Gait (normal/abnormal): antalgic  Medications (nil/NSAIDS/analg/steroids/anticoag/other):  NSAIDS, Muscle relaxants and Other - Anti-depressants and High blood pressure  Medical allergies:  See chart  General health (excellent/good/fair/poor):  fair  Pertinent medical history:  Anemia, Asthma,  Depression, Fibromyalgia, High blood pressure, Osteoarthritis, Overweight, Rheumatoid arthritis and Sleep disorder/apnea  Imaging (None/Xray/MRI/Other):  Impression:   1. Surgical changes of posterior decompression laminectomy at T12-L2.  2. Multilevel degenerative changes in the lumbar spine, greatest at  L2-3 likely secondary to bulky posterior disc osteophyte complex with  resultant moderate spinal canal stenosis and severe right neural  foraminal stenosis.  Recent or major surgery (yes/no):  no  Night pain (yes/no): no  Accidents (yes/no): no  Unexplained weight loss (yes/no): none  Barriers at home: none  Other red flags: none    EXAMINATION    Posture:   Sitting (good/fair/poor): fair  Standing (good/fair/poor):fair  Lordosis (red/acc/normal): red  Correction of posture (better/worse/no effect): worse    Lateral Shift (right/left/nil): nil  Relevant (yes/no):  no  Other Observations: none    Neurological:    Motor deficit:  Grossly R LE 4/5; painful testing/unreliable due to pain Reflexes:  Not tested  Sensory deficit:  intact  Dural signs:  +Slump L for R sided back pain    Movement Loss:   Pasquale Mod Min Nil Pain   Flexion  x   pdm   Extension x x   pdm   Side Gliding R  x   pdm   Side Gliding L  x   pdm     Test Movements:   During: produces, abolishes, increases, decreases, no effect, centralizing, peripheralizing   After: better, worse, no better, no worse, no effect, centralized, peripheralized    Pretest symptoms standing:    Symptoms During Symptoms After ROM increased ROM decreased No Effect   FIS        Rep FIS        EIS        Rep EIS          Pretest symptoms lying:     Symptoms During Symptoms After ROM increased ROM decreased No Effect   BRANDY        Rep BRANDY        EIL        Rep EIL          If required, pretest symptoms:    Symptoms During Symptoms After ROM increased ROM decreased No Effect   SGIS - R        Rep SGIS - R        SGIS - L        Rep SGIS - L          Static Tests:  Sitting slouched:      Sitting erect:    Standing slouched:   Standing erect:    Lying prone in extension:   Long sitting:      Other Tests:     Provisional Classification:  Inconclusive/Other - Chronic Pain Syndrome and Mechanically Unresponsive Radiculopathy    Principle of Management:  Education:  Traffic light, TA activation during transitional movements   Equipment provided:    Mechanical therapy (Y/N):  N   Extension principle:    Lateral Principle:    Flexion principle:    Other:  Core activation/lumbar strengthening    ASSESSMENT/PLAN:    Patient is a 52 year old female with lumbar complaints.    Patient has the following significant findings with corresponding treatment plan.                Diagnosis 1:  Chronic lumbar radiculopathy  Pain -  hot/cold therapy, manual therapy, self management, education and home program  Decreased ROM/flexibility - manual therapy and therapeutic exercise  Decreased joint mobility - manual therapy and therapeutic exercise  Decreased strength - therapeutic exercise and therapeutic activities  Decreased function - therapeutic activities    Therapy Evaluation Codes:   1) History comprised of:   Personal factors that impact the plan of care:      Coping style.    Comorbidity factors that impact the plan of care are:      nemia, Asthma, Depression, Fibromyalgia, High blood pressure, Osteoarthritis, Overweight, Rheumatoid arthritis and Sleep disorder/apnea.     Medications impacting care: NSAIDS, Muscle relaxants and Other - Anti-depressants and High blood pressure.  2) Examination of Body Systems comprised of:   Body structures and functions that impact the plan of care:      Lumbar spine.   Activity limitations that impact the plan of care are:      Bending, Dressing, Sitting, Standing, Walking and Working.  3) Clinical presentation characteristics are:   Evolving/Changing.  4) Decision-Making    Moderate complexity using standardized patient assessment instrument and/or measureable assessment of  functional outcome.  Cumulative Therapy Evaluation is: Moderate complexity.    Previous and current functional limitations:  (See Goal Flow Sheet for this information)    Short term and Long term goals: (See Goal Flow Sheet for this information)     Communication ability:  Patient appears to be able to clearly communicate and understand verbal and written communication and follow directions correctly.  Treatment Explanation - The following has been discussed with the patient:   RX ordered/plan of care  Anticipated outcomes  Possible risks and side effects  This patient would benefit from PT intervention to resume normal activities.   Rehab potential is fair.    Frequency:  1 X week, once daily  Duration:  for 6 weeks  Discharge Plan:  Achieve all LTG.  Independent in home treatment program.  Reach maximal therapeutic benefit.    Please refer to the daily flowsheet for treatment today, total treatment time and time spent performing 1:1 timed codes.

## 2022-07-22 ASSESSMENT — PATIENT HEALTH QUESTIONNAIRE - PHQ9
SUM OF ALL RESPONSES TO PHQ QUESTIONS 1-9: 10
SUM OF ALL RESPONSES TO PHQ QUESTIONS 1-9: 10
10. IF YOU CHECKED OFF ANY PROBLEMS, HOW DIFFICULT HAVE THESE PROBLEMS MADE IT FOR YOU TO DO YOUR WORK, TAKE CARE OF THINGS AT HOME, OR GET ALONG WITH OTHER PEOPLE: SOMEWHAT DIFFICULT

## 2022-07-25 ENCOUNTER — THERAPY VISIT (OUTPATIENT)
Dept: PHYSICAL THERAPY | Facility: CLINIC | Age: 52
End: 2022-07-25
Payer: COMMERCIAL

## 2022-07-25 DIAGNOSIS — M25.511 BILATERAL SHOULDER PAIN: ICD-10-CM

## 2022-07-25 DIAGNOSIS — M54.50 LUMBAGO: Primary | ICD-10-CM

## 2022-07-25 DIAGNOSIS — M25.512 BILATERAL SHOULDER PAIN: ICD-10-CM

## 2022-07-25 PROCEDURE — 97110 THERAPEUTIC EXERCISES: CPT | Mod: GP

## 2022-07-25 PROCEDURE — 97140 MANUAL THERAPY 1/> REGIONS: CPT | Mod: GP

## 2022-07-26 ENCOUNTER — VIRTUAL VISIT (OUTPATIENT)
Dept: FAMILY MEDICINE | Facility: CLINIC | Age: 52
End: 2022-07-26
Attending: ORTHOPAEDIC SURGERY
Payer: COMMERCIAL

## 2022-07-26 VITALS — BODY MASS INDEX: 37.39 KG/M2 | HEIGHT: 64 IN | WEIGHT: 219 LBS

## 2022-07-26 DIAGNOSIS — E66.812 CLASS 2 OBESITY: ICD-10-CM

## 2022-07-26 DIAGNOSIS — M54.16 CHRONIC LUMBAR RADICULOPATHY: ICD-10-CM

## 2022-07-26 DIAGNOSIS — Z87.891 HX OF SMOKING: ICD-10-CM

## 2022-07-26 DIAGNOSIS — E74.39 GLUCOSE INTOLERANCE: ICD-10-CM

## 2022-07-26 DIAGNOSIS — I10 HYPERTENSION, UNSPECIFIED TYPE: Primary | ICD-10-CM

## 2022-07-26 DIAGNOSIS — K21.9 GASTROESOPHAGEAL REFLUX DISEASE WITHOUT ESOPHAGITIS: ICD-10-CM

## 2022-07-26 DIAGNOSIS — E66.812 CLASS 2 SEVERE OBESITY WITH SERIOUS COMORBIDITY AND BODY MASS INDEX (BMI) OF 37.0 TO 37.9 IN ADULT, UNSPECIFIED OBESITY TYPE (H): ICD-10-CM

## 2022-07-26 DIAGNOSIS — E66.01 CLASS 2 SEVERE OBESITY WITH SERIOUS COMORBIDITY AND BODY MASS INDEX (BMI) OF 37.0 TO 37.9 IN ADULT, UNSPECIFIED OBESITY TYPE (H): ICD-10-CM

## 2022-07-26 DIAGNOSIS — J45.40 MODERATE PERSISTENT ASTHMA WITHOUT COMPLICATION: ICD-10-CM

## 2022-07-26 PROCEDURE — 99214 OFFICE O/P EST MOD 30 MIN: CPT | Mod: 95 | Performed by: FAMILY MEDICINE

## 2022-07-26 NOTE — PROGRESS NOTES
Zayra is a 52 year old who is being evaluated via a billable video visit.      How would you like to obtain your AVS? MyChart  If the video visit is dropped, the invitation should be resent by: Text to cell phone: [phone]  Will anyone else be joining your video visit? No      Video-Visit Details    Video Start Time: 12:19 PM    Type of service:  Video Visit    Video End Time:1:02 PM    Originating Location (pt. Location): Home    Distant Location (provider location):  [facility]     Platform used for Video Visit: [email or web address]          Answers for HPI/ROS submitted by the patient on 7/22/2022  If you checked off any problems, how difficult have these problems made it for you to do your work, take care of things at home, or get along with other people?: Somewhat difficult  PHQ9 TOTAL SCORE: 10
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
## 2022-07-26 NOTE — ASSESSMENT & PLAN NOTE
SEVERE OBESITY : Initial bmi is Body mass index is 37.59 kg/m .  With the following weight related comorbid medical conditions: Hypertension, asthma, GERD, gallstones, degenerative spinal stenosis (surgeon suggested weight loss followed by surgery) and chronic back pain.  As well as she is a smoker.  And has mental illness    PATIENT IS BEGINNING ACTIVE MEDICALLY SUPERVISED WEIGHT LOSS STARTING AT Body mass index is 37.59 kg/m .     Contraception - had hysterectomy in 1997.     Patient declined 24 week weight loss program due to cost.     Bariatric surgery was discussed but she is not interested and she has severe mental health issues which may make this very difficult.    Medications started today: saxenda (back up plan is topamax)    Current goal(s): meet [facility] pharmacist and trying to get rid of mountain dew.     Lab assessment plan: 7/16/22 nl cbc, bmp , lipid, 7/7/22 a1c 5.3, 2/2022 nl ast and alt, 2018 tsh nl.     Follow up 1 month.     DISCUSSION _________________________________________________________________    Dx: Initial bmi is Body mass index is 37.59 kg/m .  With the following weight related comorbid medical conditions: Hypertension, asthma, GERD, gallstones, degenerative spinal stenosis (surgeon suggested weight loss followed by surgery) and chronic back pain.  As well as she is a smoker.  And has mental illness    BECAUSE OF ABOVE PROBLEMS IT IS STRONGLY ADVISED THAT Zayra LOSE WEIGHT.  BELOW IS MY PLAN FOR her     Start weight 219 lbs, Body mass index is 37.59 kg/m .  7/26/2022     Medication notes:   Medications she takes, that are known to increase weight:abilify, zyrtec, cymbalta, methotrexate, lyrica and flexeril.   Medications she takes, known to decrease weight: none    SAXENDA  7/26/2022 rx sent (could advance to [facility] once we bypass supply chain shortage).    Metformin  -contraindicated due to A1c of 5.3 July 2022  Wellbutrin - Contraindicated due to psych polypharmacy  Naltrexone  contraindicated due to chronic pain  Contrave -contraindicated due to psychiatric polypharmacy and chronic pain  Phentermine/ diethylproprion  -contraindicated due to psychiatric polypharmacy  Topamax - could consider if saxenda not covered.   Qsymia contraindicated due to psychiatric poly pharmacy.   Orlistat could consider.   Plenity could consider.     Potential barriers to weight loss identified thus far:   -She has suffered from excess weight since her teenage years.  -Mental health issues have played a role  -Weight positive medications have played a role  -Quitting smoking has played a role in her excess weight  -Eating the wrong types of food  -Lack of exercise  -Chronic back pain -keeps her from being more active  - Genetics, mother and father both overweight  -She is disabled.  -Uses white carbohydrates  -Eats most of her food at the end of the day  -She has had to eat at the food shelf at least a few times this year  -Craves sweets, candy, chocolate, cheeses  -Drinks Mountain Dew but trying to cut down      Strengths that may help weight loss:  -She is a   -Likes vegetables  -Drinks water

## 2022-07-27 DIAGNOSIS — G06.1 SPINAL EPIDURAL ABSCESS: ICD-10-CM

## 2022-07-27 NOTE — PROGRESS NOTES
[facility]  Rheumatology Clinic  Panchito Saenz MD  2022     Name: Zayra Ramirez  MRN: [id number]  Age: 52 year old  : 1970  Referring provider: Aime Mason    Assessment and Plan:  # Seronegative inflammatory arthritis:  Former daily joint pain in knees and small joints of the hands and feet has dissipated significantly.  Exam shows shoulder abnormalities, see below, but no inflammatory changes in knuckles fingers toes or mid feet. Laboratory evaluation on July 15, 2022 showed normal creatinine and CBC.  CRP was markedly elevated at 122.     Discussion: Shoulder pain has continued dominant in the past 2 months, but etiology of the shoulder pain may be distinct from systemic inflammatory arthritis.  Former small joint predominant arthritis symptoms remain improved since starting methotrexate.  I recommend continuing methotrexate at current doses for now while undertaking treatment for local condition such as rotator cuff tendinitis.  Combination therapy with anti-TNF could be contemplated for recurrence of inflammatory polyarthritis.     # Impingement, now right greater than left shoulder:  Right greater than left shoulder range of motion remains restricted, with physical exam remarkable for painful active forward flexion, abduction, and resisted external rotation on the left more than right.  Crystal examination on right shoulder synovial fluid revealed CPPD crystals in 2022.  MRI  showed, among other findings, supraspinatus tendon increased signal on the right.    I think that shoulder pain has complex pathophysiology, with contributions from rotator cuff tendinitis, and more recently from crystalline disease.  I emphasized that crystalline disease is most likely to be experienced as acute, episodic, single joint disease, whereas rotator cuff tendinitis is likely the source of daily painful range of motion.  Treatment for the CPPD would include as needed anti-inflammatory therapy with  "systemic corticosteroids, intra-articular injection, or nonsteroidals.  Treatment for the rotator cuff tendinitis on the other hand is highly dependent on regular performance of physical therapy.     I recommend continued physical therapy with emphasis on range of motion exercises and a home exercise program including  \"cane raising\" and \"while walking\" exercises.  I recommend resuming Celebrex daily in addition to acetaminophen, but emphasized the primacy of physical therapy for improvement of supraspinatus tendinitis over time.    # Tobacco abuse:   Patient quit smoking in early 2022    # Degenerative arthritis, neck and lumbar spine: I appreciate Dr. Wilhelm's evaluation, and agree with offered lumbar spine corrective surgery after patient has satisfied weight reduction requirements.  She has already quit smoking per Dr. Wilhelm's recommendation.    Follow-up:     Plan:  1.  Continue methotrexate 10 tablets (25 mg) once weekly.While on methotrexate:   -- Check labs every 3 months (AST/ALT, Albumin, CBC with platelets)   -- Limit alcohol intake to 2 drinks weekly; use folate 1 mg daily.  --Tylenol 500-1000 mg can be used as needed up to three times daily for nausea/headache associated with dosing.  2.  Continue daily Physical therapy for shoulder range of motion, and back motion preservation.  3.  Recheck liver function, kidney function, and inflammation today.  4.  Restart Celebrex 200 mg once daily.  Continue acetaminophen, but alter dosage to 1000 mg taken 3 times daily.  5.  For recurrent pseudogout attacks, 10 to 14-day courses of tapering prednisone or Medrol may be helpful to alleviate severity of symptoms.  Contact rheumatology for recurrent single joint flaring symptoms.    Panchito Saenz MD  Staff Rheumatologist, [facility]:   Zayra Ramirez follows up for seronegative inflammatory arthritis and L shoulder pain.  She was last seen in 6-2-2022, when seronegative inflammatory arthritis was " "stable. Recommendation was to continue methotrexate monotherapy at 22.5 mg once weekly.    Interval history July 29, 2022    Patient underwent left subacromial injection in rheumatology clinic on Yessenia 3, 2022. She noted signifidcant improvement, with ability to use the L shoulder.     Patient was hospitalized at [facility] from July 13 through July 15, 2022 for pseudogout flareup, and rheumatoid arthritis.  History was of sudden worsening of right shoulder pain on July 12, associated with elevated inflammatory markers and leukocytosis.  Shoulder aspiration was performed in the emergency room and fluid analysis revealed CPPD crystals; she underwent shoulder injection with dramatic improvement, and she was discharged on celebrex.    Since discharge, the R shoulder has been better, able to tolerate gardening and other ADL.   Using acetaminophen 3 500 mg twice daily.    She is doing Ptx both for her shoulders and for her back. She is doing exercises at home daily for the R shoulder.     Interval history June 2, 2022    She still has waxing/waning pain in both shoulders, knees, hips. Sieges of pain start in the mornings, \"I can't even get out of bed due to severe shoulder/elbow pain\". Oftentimes it takes an hour to get out of bed, and a total of 2 hours of early morning stiffness is common.  Former pain affecting fingers wrists and toes has lessened somewhat.    Chronic back/neck pain is still a problem. She has seen Dr. Wilhelm, who has discussed low back surgery pending a weight change.     She is still on methotrexate 10 tabs weekly (since 2-2022). She does not think it helps.  She has used several course of prendionse and 2 courses of medrol, last dose 2 weeks ago.   She is \"at least 50%\" better during steroid tapers; symptoms come back within 10 days of stopping. Not using ibuprofen.     Review of Systems:   Pertinent items are noted in HPI or as below, remainder of complete ROS is negative.      No recent " problems with hearing or vision. No swallowing problems.   No breathing difficulty, shortness of breath, coughing, or wheezing.  No chest pain or palpitations.  No heart burn, indigestion, abdominal pain, nausea, vomiting, diarrhea.  No urination problems, no bloody, cloudy urine, no dysuria.  No numbing, tingling, weakness.  No headaches or confusion.  No rashes. No easy bleeding or bruising.     Active Medications:   Current Outpatient Medications   Medication Sig     albuterol (PROAIR HFA, PROVENTIL HFA, VENTOLIN HFA) 108 (90 BASE) MCG/ACT inhaler Inhale 2 puffs into the lungs every 6 hours as needed for shortness of breath / dyspnea or wheezing (PT last dose 1.23.2020)      ARIPiprazole (ABILIFY) 5 MG tablet Take 5 mg by mouth daily     busPIRone HCl (BUSPAR) 30 MG tablet Take 30 mg by mouth 2 times daily      carboxymethylcellulose (CARBOXYMETHYLCELLULOSE SODIUM) 0.5 % SOLN ophthalmic solution Place 1 drop into both eyes 3 times daily as needed for dry eyes     celecoxib (CELEBREX) 200 MG capsule Take 1 capsule (200 mg) by mouth every morning     cetirizine (ZYRTEC) 10 MG tablet Take 1 tablet (10 mg) by mouth daily     cyclobenzaprine (FLEXERIL) 10 MG tablet Take 1 tablet (10 mg) by mouth At Bedtime     DULoxetine (CYMBALTA) 60 MG capsule Take 120 mg by mouth At Bedtime      EPINEPHrine (EPIPEN/ADRENACLICK/OR ANY BX GENERIC EQUIV) 0.3 MG/0.3ML injection 2-pack      folic acid (FOLVITE) 1 MG tablet Take 1 tablet (1 mg) by mouth daily     insulin pen needle (32G X 4 MM) 32G X 4 MM miscellaneous Use 1 NEEDLE WEEKLY     liraglutide - Weight Management (SAXENDA) 18 MG/3ML pen Inject 3 mg Subcutaneous daily     methotrexate sodium 2.5 MG TABS Take 10 tablets (25 mg) by mouth once a week MONDAYS     montelukast (SINGULAIR) 10 MG tablet Take 10 mg by mouth At Bedtime     omeprazole (PRILOSEC) 40 MG DR capsule Take 1 capsule (40 mg) by mouth daily (Patient taking differently: Take 40 mg by mouth every evening)      OXYGEN-HELIUM IN 2-3L PRN during the day, 3L @ night    Oxygen only not helium     pregabalin (LYRICA) 150 MG capsule Take 1 capsule (150 mg) by mouth 2 times daily     Respiratory Therapy Supplies ([facility] CPAP FILTER) MISC      rOPINIRole (REQUIP) 0.5 MG tablet Take 1 tablet (0.5 mg) by mouth At Bedtime Take 1 tab by mouth at bedtime.     vitamin D3 (CHOLECALCIFEROL) 250 mcg (67123 units) capsule Take 1 capsule by mouth daily     zinc sulfate (ZINCATE) 220 (50 Zn) MG capsule Take 220 mg by mouth daily (with lunch)      No current facility-administered medications for this visit.     Facility-Administered Medications Ordered in Other Visits   Medication     Lidocaine 1 % injection 0.5-5 mL     sodium chloride (PF) 0.9% PF flush 5-50 mL           Allergies:   Bee Venom       Bees      Doxycycline       Erythromycin     Hydrocodone-Acetaminophen                 Past Medical History:  Remarkable for moderate, persistent asthma, hypertension, bipolar II disorder, interstitial lung disease, cervical stenosis with myelopathy 2017.       Chronic midline low back pain  Facial cellulitis  Morbid (severe) obesity due to excess calories  Dental abscess  Hypoxia  Subcutaneous emphysema  Borderline personality disorder  Stenosis of cervical spine with myelopathy  Esophageal stricture  Gastroesophageal reflux disease   Hypertension   Interstitial lung disease  Moderate persistent asthma without complication  Onychomycosis  Restless leg syndrome  Seasonal allergies  Smoker  Stress  Respiratory bronchiolitis interstitial lung disease     Past Surgical History:  Remarkable for surgical fusion  Hysterectomy 1997  rectocele and Cystocele surgery  Cholecystectomy     Family History:    The patient's family history includes Asthma in her mother; Back Pain in her father; Hypertension in her father; Other Cancer in her father.    Social History:  The patient reports that she has been smoking cigarettes, around 0.5 packs per  "day. She started smoking about 23 years ago. She has a 30.00 pack-year smoking history. She has never used smokeless tobacco. She reports that she does not drink alcohol or use drugs.   PCP: Adam eDl Toro  Marital Status: Single     Physical Exam:   /77   Pulse 70   Temp 97.9  F (36.6  C) (Oral)   Ht 1.626 m (5' 4\")   Wt 97.9 kg (215 lb 12.8 oz)   LMP  (LMP Unknown)   SpO2 95%   BMI 37.04 kg/m     Wt Readings from Last 4 Encounters:   07/29/22 97.9 kg (215 lb 12.8 oz)   07/28/22 98.1 kg (216 lb 3.2 oz)   07/26/22 99.3 kg (219 lb)   07/15/22 101.4 kg (223 lb 9.6 oz)     Constitutional: Well-developed, appearing stated age; cooperative.  Eyes: Normal EOM, PERRLA, vision, conjunctiva, sclera.  ENT: Normal external ears, nose, hearing, lips, teeth, gums, throat. No mucous membrane lesions.  Neck: No mass or thyroid enlargement.  Resp: Breathing unlabored  MS: Right shoulder active flexion limited to 60 degrees; active abduction limited to 60 degrees.  Positive impingement signs.  Left forward elevation 120 degrees and abduction possible to 90 degrees..Fist formation intact, normal wrist ROM.  No visible swelling at MCPs or PIPs.  MTPs nontender.  Skin: No nail pitting, alopecia, rash, nodules or lesions.  Neuro: Normal cranial nerves  Psych: Normal judgement, orientation, memory, affect.    Laboratory:   RHEUM RESULTS Latest Ref Rng & Units 3/5/2015 3/6/2015 3/7/2015   ALBUMIN 3.4 - 5.0 g/dL - - -   ALT 0 - 50 U/L - - -   AST 0 - 45 U/L - - -   CREATININE 0.52 - 1.04 mg/dL 0.66 0.78 -   CRP 0.0 - 8.0 mg/L - - -   GFR ESTIMATE, IF BLACK >60 mL/min/[1.73:m2] >90  African American GFR Calc   >90   GFR Calc   -   GFR ESTIMATE >60 mL/min/1.73m2 >90  Non  GFR Calc   81 -   HEMATOCRIT 35.0 - 47.0 % 40.4 40.5 38.4   HEMOGLOBIN 11.7 - 15.7 g/dL 13.5 13.4 12.3   WBC 4.0 - 11.0 10e3/uL 16.3(H) 12.8(H) 8.7   RBC 3.80 - 5.20 10e6/uL 4.46 4.44 4.19   RDW 10.0 - 15.0 % 14.6 14.6 14.4 "   MCHC 31.5 - 36.5 g/dL 33.4 33.1 32.0   MCV 78 - 100 fL 91 91 92   PLATELET COUNT 150 - 450 10e3/uL 315 298 285   ESR 0 - 30 mm/hr - - -

## 2022-07-28 ENCOUNTER — LAB (OUTPATIENT)
Dept: LAB | Facility: CLINIC | Age: 52
End: 2022-07-28
Payer: COMMERCIAL

## 2022-07-28 ENCOUNTER — OFFICE VISIT (OUTPATIENT)
Dept: INTERNAL MEDICINE | Facility: CLINIC | Age: 52
End: 2022-07-28

## 2022-07-28 ENCOUNTER — TELEPHONE (OUTPATIENT)
Dept: FAMILY MEDICINE | Facility: CLINIC | Age: 52
End: 2022-07-28

## 2022-07-28 ENCOUNTER — ANCILLARY PROCEDURE (OUTPATIENT)
Dept: ULTRASOUND IMAGING | Facility: CLINIC | Age: 52
End: 2022-07-28
Attending: NURSE PRACTITIONER
Payer: COMMERCIAL

## 2022-07-28 VITALS
DIASTOLIC BLOOD PRESSURE: 77 MMHG | HEART RATE: 96 BPM | OXYGEN SATURATION: 96 % | RESPIRATION RATE: 16 BRPM | HEIGHT: 64 IN | WEIGHT: 216.2 LBS | BODY MASS INDEX: 36.91 KG/M2 | SYSTOLIC BLOOD PRESSURE: 112 MMHG

## 2022-07-28 DIAGNOSIS — Z79.899 HIGH RISK MEDICATION USE: ICD-10-CM

## 2022-07-28 DIAGNOSIS — R10.9 FLANK PAIN: ICD-10-CM

## 2022-07-28 DIAGNOSIS — R10.13 ABDOMINAL PAIN, EPIGASTRIC: ICD-10-CM

## 2022-07-28 DIAGNOSIS — R10.9 FLANK PAIN: Primary | ICD-10-CM

## 2022-07-28 DIAGNOSIS — Z86.2 HISTORY OF ANEMIA: ICD-10-CM

## 2022-07-28 DIAGNOSIS — G25.81 RESTLESS LEG SYNDROME: ICD-10-CM

## 2022-07-28 LAB
ALBUMIN UR-MCNC: NEGATIVE MG/DL
APPEARANCE UR: CLEAR
BILIRUB UR QL STRIP: NEGATIVE
COLOR UR AUTO: YELLOW
GLUCOSE UR STRIP-MCNC: NEGATIVE MG/DL
HGB UR QL STRIP: NEGATIVE
IRON SATN MFR SERPL: 17 % (ref 15–46)
IRON SERPL-MCNC: 48 UG/DL (ref 35–180)
KETONES UR STRIP-MCNC: NEGATIVE MG/DL
LEUKOCYTE ESTERASE UR QL STRIP: NEGATIVE
NITRATE UR QL: NEGATIVE
PH UR STRIP: 6.5 [PH] (ref 5–7)
RBC URINE: <1 /HPF
SP GR UR STRIP: 1.01 (ref 1–1.03)
SQUAMOUS EPITHELIAL: 1 /HPF
TIBC SERPL-MCNC: 281 UG/DL (ref 240–430)
UROBILINOGEN UR STRIP-MCNC: NORMAL MG/DL
WBC URINE: 1 /HPF

## 2022-07-28 PROCEDURE — 82565 ASSAY OF CREATININE: CPT | Performed by: PATHOLOGY

## 2022-07-28 PROCEDURE — 99215 OFFICE O/P EST HI 40 MIN: CPT | Performed by: NURSE PRACTITIONER

## 2022-07-28 PROCEDURE — 81001 URINALYSIS AUTO W/SCOPE: CPT | Performed by: PATHOLOGY

## 2022-07-28 PROCEDURE — 76705 ECHO EXAM OF ABDOMEN: CPT | Mod: GC | Performed by: STUDENT IN AN ORGANIZED HEALTH CARE EDUCATION/TRAINING PROGRAM

## 2022-07-28 PROCEDURE — 86140 C-REACTIVE PROTEIN: CPT | Performed by: PATHOLOGY

## 2022-07-28 PROCEDURE — 36415 COLL VENOUS BLD VENIPUNCTURE: CPT | Performed by: PATHOLOGY

## 2022-07-28 PROCEDURE — 84450 TRANSFERASE (AST) (SGOT): CPT | Performed by: PATHOLOGY

## 2022-07-28 PROCEDURE — 84460 ALANINE AMINO (ALT) (SGPT): CPT | Performed by: PATHOLOGY

## 2022-07-28 PROCEDURE — 83550 IRON BINDING TEST: CPT | Performed by: PATHOLOGY

## 2022-07-28 RX ORDER — CYCLOBENZAPRINE HCL 10 MG
10 TABLET ORAL AT BEDTIME
Qty: 90 TABLET | Refills: 1 | Status: ON HOLD | OUTPATIENT
Start: 2022-07-28 | End: 2022-12-06

## 2022-07-28 NOTE — TELEPHONE ENCOUNTER
Central Prior Authorization Team   Phone: [phone]    PA Initiation    Medication: liraglutide - Weight Management (SAXENDA) 18 MG/3ML pen  Insurance Company: [employer] - Phone [phone] Fax [phone]  Pharmacy Filling the Rx: [id number] [place], [place] - [address]  Filling Pharmacy Phone: [phone]  Filling Pharmacy Fax: [phone]  Start Date: 7/28/2022

## 2022-07-28 NOTE — PROGRESS NOTES
S: Zayra Ramirez is a 52 year old female for hospital discharge follow-up for pseudogout of the right shoulder. She has a follow-up appt with  tomorrow. She was resumed on methotrexate.     She states she has incomplete bladder emptying resulting in frequent urination.  She has also noted bilateral flank discomfort and what she thinks is an associated tightness in her belly.  She states her urine looks normal.  No fevers.     She states she has a Urology bladder US scheduled for sept 2, 2022.         Patient Active Problem List   Diagnosis     Lumbago     Facial cellulitis     Class 2 severe obesity with serious comorbidity and body mass index (BMI) of 37.0 to 37.9 in adult, unspecified obesity type (H)     Dental abscess     Hypoxia     Subcutaneous emphysema (H)     Borderline personality disorder (H)     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)     Esophageal stricture     GERD (gastroesophageal reflux disease)     HTN (hypertension)     Interstitial lung disease (H)     Moderate persistent asthma without complication     Onychomycosis     Restless leg syndrome     Seasonal allergies     Stress     Respiratory bronchiolitis interstitial lung disease (H)     Bilateral shoulder pain     Spinal epidural abscess     Lumbar spondylosis     Inflammatory arthritis     Arthralgia of temporomandibular joint     Articular disc disorder of temporomandibular joint     Derangement of temporomandibular joint     Calculus of gallbladder without cholecystitis without obstruction     Displacement of articular disc of temporomandibular joint with reduction     Myofascial pain     Oral lesion     Symptomatic cholelithiasis     Sacro-iliac pain     Degenerative lumbar spinal stenosis     Chronic lumbar radiculopathy     Glucose intolerance     Chronic neck pain     Lumbar radiculopathy, chronic     Cervical radiculopathy     Acute pain of right shoulder            Past Medical History:   Diagnosis Date      Allergic rhinitis      Anemia      Arthritis      Asthma     copd     Dental abscess 08/2015     Depressive disorder      Gastroesophageal reflux disease      History of emphysema      Hoarseness      Hypertension      Obstructive sleep apnea      Other chronic pain      Respiratory bronchiolitis interstitial lung disease (H)      Sleep apnea      Smoker 11/02/2015            Past Surgical History:   Procedure Laterality Date     CERVICAL FUSION       COLONOSCOPY       COLONOSCOPY N/A 02/06/2020    Procedure: COLONOSCOPY, WITH POLYPECTOMY AND BIOPSY;  Surgeon: Julian Mccullough MD;  Location:  GI     ENT SURGERY       ESOPHAGOSCOPY, GASTROSCOPY, DUODENOSCOPY (EGD), COMBINED N/A 02/06/2020    Procedure: ESOPHAGOGASTRODUODENOSCOPY (EGD);  Surgeon: Julian Mccullough MD;  Location:  GI     EXCISE LESION INTRAORAL Bilateral 10/03/2018    Procedure: EXCISE LESION INTRAORAL;  Wide Local Excision Of of Left Oral Cavity Ulcer;  Surgeon: Morro Mijares MD;  Location:  OR     HC DRAIN SKIN ABSCESS SIMPLE/SINGLE  03/16/2012    Procedure:INCISION AND DRAINAGE, ABSCESS, SIMPLE; Surgeon:CHRISTIANO HANCOCK; Location: GI     HEAD & NECK SURGERY       HYSTERECTOMY       HYSTERECTOMY       INCISION AND DRAINAGE ABDOMEN WASHOUT, COMBINED       INJECT EPIDURAL CERVICAL N/A 10/14/2021    Procedure: Cervical 7- Thoracic 1 epidural steroid injection with fluoroscopy;  Surgeon: Tram Florian MD;  Location: [facility] OR     INJECT EPIDURAL LUMBAR Right 09/15/2020    Procedure: Lumbar5- sacral 1 epidural steroid injection with fluoroscopy;  Surgeon: Tram Florian MD;  [facility]     INJECT EPIDURAL LUMBAR Right 06/29/2021    Procedure: Lumbar 5 sacral 1 epidural steroid injection with fluoroscopy;  Surgeon: Tram Florian MD;  Location: [facility] OR     INJECT SACROILIAC JOINT Bilateral 06/16/2020    Procedure: Bilateral sacroiliac joint steroid injection with fluoroscopy;  Surgeon: Tram Florian MD;  [facility]  OR     LAMINECTOMY THORACIC ONE LEVEL N/A 2019    Procedure: LAMINECTOMY, SPINE, THORACIC, 11-12 and Part of Lumbar 1, DRAINAGE OF EPIDURAL ABCESS, Epidural Drain Placement X 2;  Surgeon: Yadiel Beal MD;  Location:  OR     MD PERCUT IMPLNT NEUROELECT,EPIDURAL N/A 2019    Procedure: TRIAL, SPINAL CORD STIMULATOR WITH [employer];  Surgeon: Sipple, Daniel Peter, DO;  Location: [facility];  Service: Pain     RADIO FREQUENCY ABLATION / DESTRUCTION OF SACROILOAC JOINT DORSAL PRIMARY RAMUS Bilateral 2019    Procedure: Bilateral lumbar radiofrequency ablation with fluoroscopy and intravenous sedation ( Lumbar 2,3,4,5 medial branch nerves for the bilateral lumbar3-4, 4-5 and 5-sacral1 joints.;  Surgeon: Tram Florian MD;  Location: [facility]     RADIO FREQUENCY ABLATION / DESTRUCTION OF SACROILOAC JOINT DORSAL PRIMARY RAMUS Right 2020    Procedure: Right lumbar medial branch nerve radiofrequency ablation right L2,3,4,5 nerves supplying the right L3-4, L4-5 and L5-S1 facet joints;  Surgeon: Tram Florian MD;  Location: [facility]     spinal cord stimulator  2019     spinal cord stimulator removal  2019            Social History     Tobacco Use     Smoking status: Former Smoker     Packs/day: 1.00     Years: 30.00     Pack years: 30.00     Types: Cigarettes     Start date: 1996     Quit date: 2021     Years since quittin.7     Smokeless tobacco: Never Used   Substance Use Topics     Alcohol use: No            Family History   Problem Relation Age of Onset     Asthma Mother      Restless Leg Syndrome Mother      Other Cancer Father         base of tongue cancer at age ~65     Hypertension Father      Back Pain Father      Restless Leg Syndrome Father      Coronary Artery Disease Father      Restless Leg Syndrome Sister      Breast Cancer Maternal Aunt 55     Anesthesia Reaction No family hx of      Deep Vein Thrombosis (DVT) No family hx of      Thyroid  Cancer No family hx of      Multiple endocrine neoplasia No family hx of                Allergies   Allergen Reactions     Bee Venom Anaphylaxis     Bees      Doxycycline Anaphylaxis     Patient thinks it may have been just nausea and vomiting, however unable to confirm     Erythromycin Anaphylaxis and Shortness Of Breath     Other reaction(s): Vomiting     Hydrocodone-Acetaminophen Itching            Current Outpatient Medications   Medication Sig Dispense Refill     albuterol (PROAIR HFA, PROVENTIL HFA, VENTOLIN HFA) 108 (90 BASE) MCG/ACT inhaler Inhale 2 puffs into the lungs every 6 hours as needed for shortness of breath / dyspnea or wheezing (PT last dose 1.23.2020)        ARIPiprazole (ABILIFY) 5 MG tablet Take 5 mg by mouth daily       busPIRone HCl (BUSPAR) 30 MG tablet Take 30 mg by mouth 2 times daily        carboxymethylcellulose (CARBOXYMETHYLCELLULOSE SODIUM) 0.5 % SOLN ophthalmic solution Place 1 drop into both eyes 3 times daily as needed for dry eyes 15 mL 11     celecoxib (CELEBREX) 200 MG capsule Take 1 capsule (200 mg) by mouth every morning 60 capsule 4     cetirizine (ZYRTEC) 10 MG tablet Take 1 tablet (10 mg) by mouth daily 90 tablet 2     cyclobenzaprine (FLEXERIL) 10 MG tablet Take 1 tablet (10 mg) by mouth At Bedtime please keep appt 6/30/22. 90 tablet 0     DULoxetine (CYMBALTA) 60 MG capsule Take 120 mg by mouth At Bedtime        EPINEPHrine (EPIPEN/ADRENACLICK/OR ANY BX GENERIC EQUIV) 0.3 MG/0.3ML injection 2-pack        folic acid (FOLVITE) 1 MG tablet Take 1 tablet (1 mg) by mouth daily 90 tablet 3     insulin pen needle (32G X 4 MM) 32G X 4 MM miscellaneous Use 1 NEEDLE WEEKLY 12 each 3     liraglutide - Weight Management (SAXENDA) 18 MG/3ML pen Inject 3 mg Subcutaneous daily 15 mL 0     methotrexate sodium 2.5 MG TABS Take 9 tablets (22.5 mg) by mouth once a week (Patient taking differently: Take 25 mg by mouth once a week MONDAYS) 108 tablet 2     montelukast (SINGULAIR) 10 MG tablet  "Take 10 mg by mouth At Bedtime       omeprazole (PRILOSEC) 40 MG DR capsule Take 1 capsule (40 mg) by mouth daily (Patient taking differently: Take 40 mg by mouth every evening) 180 capsule 3     OXYGEN-HELIUM IN 2-3L PRN during the day, 3L @ night    Oxygen only not helium       pregabalin (LYRICA) 150 MG capsule Take 1 capsule (150 mg) by mouth 2 times daily 60 capsule 3     Respiratory Therapy Supplies ([facility] CPAP FILTER) MISC        rOPINIRole (REQUIP) 0.5 MG tablet Take 1 tablet (0.5 mg) by mouth At Bedtime Take 1 tab by mouth at bedtime. 90 tablet 1     vitamin D3 (CHOLECALCIFEROL) 250 mcg (13968 units) capsule Take 1 capsule by mouth daily       zinc sulfate (ZINCATE) 220 (50 Zn) MG capsule Take 220 mg by mouth daily (with lunch)          REVIEW OF SYSTEMS:  See above.    O:   /77 (BP Location: Right arm, Patient Position: Sitting, Cuff Size: Adult Large)   Pulse 96   Resp 16   Ht 1.626 m (5' 4\")   Wt 98.1 kg (216 lb 3.2 oz)   LMP  (LMP Unknown)   SpO2 96%   BMI 37.11 kg/m    GENERAL APPEARANCE: healthy, alert and no distress  EYES: grossly normal.  RESP: lungs clear to auscultation - no rales, rhonchi or wheezes  CV: regular rates and rhythm, normal S1 S2, no S3 or S4 and no murmur, click or rub   ABDOMEN:  Soft,point  tender over epigastric  And left para-midline abdomen.region, no HSM or masses and bowel sounds normal  NEURO: normal.  SKIN:normal.  PSYCHE: normal.    A/P:  Zayra was seen today for hospital f/u, shoulder pain, abdominal pain and hernia.    Diagnoses and all orders for this visit:    Flank pain  -     UA with Micro reflex to Culture; Future    Abdominal pain, epigastric  -     US Hernia Evaluation; Future    US did not demonstrate adbominal wall hernia.    The patient voiced understanding of the information discussed and all questions were answered.     I spent a total of 40  minutes in the care of this pt during today's office visit. This time includes reviewing " the patient's chart and prior history, obtaining a history, performing an examination and evaluation and counseling the patient. This time also includes ordering medications or tests necessary in addition to communication to other member's of the patient's health care team. Time spent in documentation and care coordination is included.     Neena MAHARAJ, CNP

## 2022-07-28 NOTE — TELEPHONE ENCOUNTER
Prior Authorization Request  Who s requesting:  Pharmacy  Pharmacy Name and Location: [facility] [id number]  Medication Name: liraglutide - Weight Management (SAXENDA) 18 MG/3ML pen  Insurance Plan: [facility]  Insurance Member ID Number:  [id number]  CoverMyMeds Key:   Informed patient that prior authorizations can take up to 10 business days for response:   NA  Okay to leave a detailed message: No     Route to pool:  TEGAN MARIE MED

## 2022-07-28 NOTE — NURSING NOTE
"Zayra Ramirez is a 52 year old female patient that presents today in clinic for the following:    Chief Complaint   Patient presents with     Hospital F/U     Shoulder Pain     Abdominal Pain     She first noted the mayen for about 45 days. She describes it as a clicking sensation. 5/10 pain which decreases rapidly. Localized epigastric pain. Denies trauma, noted that is was present at her last hospital      Hernia     The patient's allergies and medications were reviewed as noted. A set of vitals were recorded as noted without incident: /77 (BP Location: Right arm, Patient Position: Sitting, Cuff Size: Adult Large)   Pulse 96   Resp 16   Ht 1.626 m (5' 4\")   Wt 98.1 kg (216 lb 3.2 oz)   LMP  (LMP Unknown)   SpO2 96%   BMI 37.11 kg/m  . The patient does not have any other questions for the provider.    Fady Rodriguez, EMT at 1:22 PM on 7/28/2022  "

## 2022-07-28 NOTE — TELEPHONE ENCOUNTER
cyclobenzaprine (FLEXERIL) 10 MG tablet  Last Written Prescription Date:  6/14/22  Last Fill Quantity: 90,   # refills: 0  Last Office Visit : 6/30/22  Future Office visit:  7/28/22    Routing refill request to provider for review/approval because:  Not on protocol

## 2022-07-29 ENCOUNTER — OFFICE VISIT (OUTPATIENT)
Dept: RHEUMATOLOGY | Facility: CLINIC | Age: 52
End: 2022-07-29
Attending: INTERNAL MEDICINE
Payer: COMMERCIAL

## 2022-07-29 VITALS
OXYGEN SATURATION: 95 % | BODY MASS INDEX: 36.84 KG/M2 | WEIGHT: 215.8 LBS | HEIGHT: 64 IN | HEART RATE: 70 BPM | DIASTOLIC BLOOD PRESSURE: 77 MMHG | TEMPERATURE: 97.9 F | SYSTOLIC BLOOD PRESSURE: 115 MMHG

## 2022-07-29 DIAGNOSIS — M54.2 CHRONIC NECK PAIN: ICD-10-CM

## 2022-07-29 DIAGNOSIS — G89.29 CHRONIC MIDLINE LOW BACK PAIN WITH RIGHT-SIDED SCIATICA: ICD-10-CM

## 2022-07-29 DIAGNOSIS — M54.41 CHRONIC MIDLINE LOW BACK PAIN WITH RIGHT-SIDED SCIATICA: ICD-10-CM

## 2022-07-29 DIAGNOSIS — G89.29 CHRONIC NECK PAIN: ICD-10-CM

## 2022-07-29 DIAGNOSIS — Z87.39 HISTORY OF SERONEGATIVE INFLAMMATORY ARTHRITIS: ICD-10-CM

## 2022-07-29 DIAGNOSIS — Z79.899 HIGH RISK MEDICATION USE: Primary | ICD-10-CM

## 2022-07-29 LAB
ALT SERPL W P-5'-P-CCNC: 22 U/L (ref 0–50)
AST SERPL W P-5'-P-CCNC: 20 U/L (ref 0–45)
CREAT SERPL-MCNC: 0.91 MG/DL (ref 0.52–1.04)
CRP SERPL-MCNC: 11.1 MG/L (ref 0–8)
GFR SERPL CREATININE-BSD FRML MDRD: 76 ML/MIN/1.73M2

## 2022-07-29 PROCEDURE — 99214 OFFICE O/P EST MOD 30 MIN: CPT | Performed by: INTERNAL MEDICINE

## 2022-07-29 PROCEDURE — G0463 HOSPITAL OUTPT CLINIC VISIT: HCPCS

## 2022-07-29 RX ORDER — FOLIC ACID 1 MG/1
1 TABLET ORAL DAILY
Qty: 90 TABLET | Refills: 3 | Status: ON HOLD | OUTPATIENT
Start: 2022-07-29 | End: 2022-12-06

## 2022-07-29 RX ORDER — CELECOXIB 200 MG/1
200 CAPSULE ORAL EVERY MORNING
Qty: 30 CAPSULE | Refills: 3 | Status: ON HOLD | OUTPATIENT
Start: 2022-07-29 | End: 2022-12-06

## 2022-07-29 RX ORDER — METHOTREXATE 2.5 MG/1
25 TABLET ORAL WEEKLY
Qty: 40 TABLET | Refills: 5 | Status: SHIPPED | OUTPATIENT
Start: 2022-07-29 | End: 2022-08-29

## 2022-07-29 ASSESSMENT — PAIN SCALES - GENERAL: PAINLEVEL: NO PAIN (0)

## 2022-07-29 NOTE — TELEPHONE ENCOUNTER
Prior Authorization Approval    Authorization Effective Date: 6/28/2022  Authorization Expiration Date: 1/25/2023  Medication: liraglutide - Weight Management (SAXENDA) 18 MG/3ML pen-PA approved  Approved Dose/Quantity:   Reference #:     Insurance Company: [employer] - Phone [phone] Fax [phone]  Expected CoPay:       CoPay Card Available:      Foundation Assistance Needed:    Which Pharmacy is filling the prescription (Not needed for infusion/clinic administered): [facility] [id number] [place] [facility] [place], [place] - [address] [place] [address]  Pharmacy Notified: Yes  Patient Notified: No

## 2022-07-29 NOTE — PATIENT INSTRUCTIONS
Diagnosis:  1.  Inflammatory arthritis: Small joint symptoms are improved compared to pre-methotrexate. I recommend continuing on methotrexate at current dose.  2.  Rotator cuff tendinitis, right shoulder: Range of motion is still limited despite corticosteroid injection and despite physical therapy.  I expect that range of motion and pain control will improve gradually with continued physical therapy.  I recommend adding Celebrex to scheduled acetaminophen for better pain control.  3.  Neck and low back pain: Continue work with Dr. Wilhelm.  4.  Pseudogout, right shoulder: Symptoms are improved after shoulder injection.  Flaring pain may recur, and brief courses of prednisone or Medrol may be indicated to manage occasional flares.    Plan:  1.  Continue methotrexate 10 tablets (25 mg) once weekly.While on methotrexate:   -- Check labs every 3 months (AST/ALT, Albumin, CBC with platelets)   -- Limit alcohol intake to 2 drinks weekly; use folate 1 mg daily.  --Tylenol 500-1000 mg can be used as needed up to three times daily for nausea/headache associated with dosing.  2.  Continue daily Physical therapy for shoulder range of motion, and back motion preservation.  3.  Recheck liver function, kidney function, and inflammation today.  4.  Restart Celebrex 200 mg once daily.  Continue acetaminophen, but alter dosage to 1000 mg taken 3 times daily.

## 2022-07-29 NOTE — LETTER
2022       RE: Zayra Ramirez  [address] [place] [address]     Dear Colleague,    Thank you for referring your patient, Zayra Ramirez, to the [facility] [place] at [facility]. Please see a copy of my visit note below.    [facility]  Rheumatology Clinic  Panchito Saenz MD  2022     Name: Zayra Ramirez  MRN: [id number]  Age: 52 year old  : 1970  Referring provider: Aime Mason    Assessment and Plan:  # Seronegative inflammatory arthritis:  Former daily joint pain in knees and small joints of the hands and feet has dissipated significantly.  Exam shows shoulder abnormalities, see below, but no inflammatory changes in knuckles fingers toes or mid feet. Laboratory evaluation on July 15, 2022 showed normal creatinine and CBC.  CRP was markedly elevated at 122.     Discussion: Shoulder pain has continued dominant in the past 2 months, but etiology of the shoulder pain may be distinct from systemic inflammatory arthritis.  Former small joint predominant arthritis symptoms remain improved since starting methotrexate.  I recommend continuing methotrexate at current doses for now while undertaking treatment for local condition such as rotator cuff tendinitis.  Combination therapy with anti-TNF could be contemplated for recurrence of inflammatory polyarthritis.     # Impingement, now right greater than left shoulder:  Right greater than left shoulder range of motion remains restricted, with physical exam remarkable for painful active forward flexion, abduction, and resisted external rotation on the left more than right.  Crystal examination on right shoulder synovial fluid revealed CPPD crystals in 2022.  MRI  showed, among other findings, supraspinatus tendon increased signal on the right.    I think that shoulder pain has complex pathophysiology, with contributions from rotator cuff tendinitis,  "and more recently from crystalline disease.  I emphasized that crystalline disease is most likely to be experienced as acute, episodic, single joint disease, whereas rotator cuff tendinitis is likely the source of daily painful range of motion.  Treatment for the CPPD would include as needed anti-inflammatory therapy with systemic corticosteroids, intra-articular injection, or nonsteroidals.  Treatment for the rotator cuff tendinitis on the other hand is highly dependent on regular performance of physical therapy.     I recommend continued physical therapy with emphasis on range of motion exercises and a home exercise program including  \"cane raising\" and \"while walking\" exercises.  I recommend resuming Celebrex daily in addition to acetaminophen, but emphasized the primacy of physical therapy for improvement of supraspinatus tendinitis over time.    # Tobacco abuse:   Patient quit smoking in early 2022    # Degenerative arthritis, neck and lumbar spine: I appreciate Dr. Wilhelm's evaluation, and agree with offered lumbar spine corrective surgery after patient has satisfied weight reduction requirements.  She has already quit smoking per Dr. Wilhelm's recommendation.    Follow-up:     Plan:  1.  Continue methotrexate 10 tablets (25 mg) once weekly.While on methotrexate:   -- Check labs every 3 months (AST/ALT, Albumin, CBC with platelets)   -- Limit alcohol intake to 2 drinks weekly; use folate 1 mg daily.  --Tylenol 500-1000 mg can be used as needed up to three times daily for nausea/headache associated with dosing.  2.  Continue daily Physical therapy for shoulder range of motion, and back motion preservation.  3.  Recheck liver function, kidney function, and inflammation today.  4.  Restart Celebrex 200 mg once daily.  Continue acetaminophen, but alter dosage to 1000 mg taken 3 times daily.  5.  For recurrent pseudogout attacks, 10 to 14-day courses of tapering prednisone or Medrol may be helpful to alleviate " "severity of symptoms.  Contact rheumatology for recurrent single joint flaring symptoms.    Panchito Saenz MD  Staff Rheumatologist, [facility]:   Zayra Ramirez follows up for seronegative inflammatory arthritis and L shoulder pain.  She was last seen in 6-2-2022, when seronegative inflammatory arthritis was stable. Recommendation was to continue methotrexate monotherapy at 22.5 mg once weekly.    Interval history July 29, 2022    Patient underwent left subacromial injection in rheumatology clinic on Yessenia 3, 2022. She noted signifidcant improvement, with ability to use the L shoulder.     Patient was hospitalized at [facility] from July 13 through July 15, 2022 for pseudogout flareup, and rheumatoid arthritis.  History was of sudden worsening of right shoulder pain on July 12, associated with elevated inflammatory markers and leukocytosis.  Shoulder aspiration was performed in the emergency room and fluid analysis revealed CPPD crystals; she underwent shoulder injection with dramatic improvement, and she was discharged on celebrex.    Since discharge, the R shoulder has been better, able to tolerate gardening and other ADL.   Using acetaminophen 3 500 mg twice daily.    She is doing Ptx both for her shoulders and for her back. She is doing exercises at home daily for the R shoulder.     Interval history June 2, 2022    She still has waxing/waning pain in both shoulders, knees, hips. Sieges of pain start in the mornings, \"I can't even get out of bed due to severe shoulder/elbow pain\". Oftentimes it takes an hour to get out of bed, and a total of 2 hours of early morning stiffness is common.  Former pain affecting fingers wrists and toes has lessened somewhat.    Chronic back/neck pain is still a problem. She has seen Dr. Wilhelm, who has discussed low back surgery pending a weight change.     She is still on methotrexate 10 tabs weekly (since 2-2022). She does not think it helps.  She has used " "several course of prendionse and 2 courses of medrol, last dose 2 weeks ago.   She is \"at least 50%\" better during steroid tapers; symptoms come back within 10 days of stopping. Not using ibuprofen.     Review of Systems:   Pertinent items are noted in HPI or as below, remainder of complete ROS is negative.      No recent problems with hearing or vision. No swallowing problems.   No breathing difficulty, shortness of breath, coughing, or wheezing.  No chest pain or palpitations.  No heart burn, indigestion, abdominal pain, nausea, vomiting, diarrhea.  No urination problems, no bloody, cloudy urine, no dysuria.  No numbing, tingling, weakness.  No headaches or confusion.  No rashes. No easy bleeding or bruising.     Active Medications:   Current Outpatient Medications   Medication Sig     albuterol (PROAIR HFA, PROVENTIL HFA, VENTOLIN HFA) 108 (90 BASE) MCG/ACT inhaler Inhale 2 puffs into the lungs every 6 hours as needed for shortness of breath / dyspnea or wheezing (PT last dose 1.23.2020)      ARIPiprazole (ABILIFY) 5 MG tablet Take 5 mg by mouth daily     busPIRone HCl (BUSPAR) 30 MG tablet Take 30 mg by mouth 2 times daily      carboxymethylcellulose (CARBOXYMETHYLCELLULOSE SODIUM) 0.5 % SOLN ophthalmic solution Place 1 drop into both eyes 3 times daily as needed for dry eyes     celecoxib (CELEBREX) 200 MG capsule Take 1 capsule (200 mg) by mouth every morning     cetirizine (ZYRTEC) 10 MG tablet Take 1 tablet (10 mg) by mouth daily     cyclobenzaprine (FLEXERIL) 10 MG tablet Take 1 tablet (10 mg) by mouth At Bedtime     DULoxetine (CYMBALTA) 60 MG capsule Take 120 mg by mouth At Bedtime      EPINEPHrine (EPIPEN/ADRENACLICK/OR ANY BX GENERIC EQUIV) 0.3 MG/0.3ML injection 2-pack      folic acid (FOLVITE) 1 MG tablet Take 1 tablet (1 mg) by mouth daily     insulin pen needle (32G X 4 MM) 32G X 4 MM miscellaneous Use 1 NEEDLE WEEKLY     liraglutide - Weight Management (SAXENDA) 18 MG/3ML pen Inject 3 mg " Subcutaneous daily     methotrexate sodium 2.5 MG TABS Take 10 tablets (25 mg) by mouth once a week MONDAYS     montelukast (SINGULAIR) 10 MG tablet Take 10 mg by mouth At Bedtime     omeprazole (PRILOSEC) 40 MG DR capsule Take 1 capsule (40 mg) by mouth daily (Patient taking differently: Take 40 mg by mouth every evening)     OXYGEN-HELIUM IN 2-3L PRN during the day, 3L @ night    Oxygen only not helium     pregabalin (LYRICA) 150 MG capsule Take 1 capsule (150 mg) by mouth 2 times daily     Respiratory Therapy Supplies ([facility] CPAP FILTER) MISC      rOPINIRole (REQUIP) 0.5 MG tablet Take 1 tablet (0.5 mg) by mouth At Bedtime Take 1 tab by mouth at bedtime.     vitamin D3 (CHOLECALCIFEROL) 250 mcg (98533 units) capsule Take 1 capsule by mouth daily     zinc sulfate (ZINCATE) 220 (50 Zn) MG capsule Take 220 mg by mouth daily (with lunch)      No current facility-administered medications for this visit.     Facility-Administered Medications Ordered in Other Visits   Medication     Lidocaine 1 % injection 0.5-5 mL     sodium chloride (PF) 0.9% PF flush 5-50 mL           Allergies:   Bee Venom       Bees      Doxycycline       Erythromycin     Hydrocodone-Acetaminophen                 Past Medical History:  Remarkable for moderate, persistent asthma, hypertension, bipolar II disorder, interstitial lung disease, cervical stenosis with myelopathy 2017.       Chronic midline low back pain  Facial cellulitis  Morbid (severe) obesity due to excess calories  Dental abscess  Hypoxia  Subcutaneous emphysema  Borderline personality disorder  Stenosis of cervical spine with myelopathy  Esophageal stricture  Gastroesophageal reflux disease   Hypertension   Interstitial lung disease  Moderate persistent asthma without complication  Onychomycosis  Restless leg syndrome  Seasonal allergies  Smoker  Stress  Respiratory bronchiolitis interstitial lung disease     Past Surgical History:  Remarkable for surgical  "fusion  Hysterectomy 1997  rectocele and Cystocele surgery  Cholecystectomy     Family History:    The patient's family history includes Asthma in her mother; Back Pain in her father; Hypertension in her father; Other Cancer in her father.    Social History:  The patient reports that she has been smoking cigarettes, around 0.5 packs per day. She started smoking about 23 years ago. She has a 30.00 pack-year smoking history. She has never used smokeless tobacco. She reports that she does not drink alcohol or use drugs.   PCP: Adam Del Toro  Marital Status: Single     Physical Exam:   /77   Pulse 70   Temp 97.9  F (36.6  C) (Oral)   Ht 1.626 m (5' 4\")   Wt 97.9 kg (215 lb 12.8 oz)   LMP  (LMP Unknown)   SpO2 95%   BMI 37.04 kg/m     Wt Readings from Last 4 Encounters:   07/29/22 97.9 kg (215 lb 12.8 oz)   07/28/22 98.1 kg (216 lb 3.2 oz)   07/26/22 99.3 kg (219 lb)   07/15/22 101.4 kg (223 lb 9.6 oz)     Constitutional: Well-developed, appearing stated age; cooperative.  Eyes: Normal EOM, PERRLA, vision, conjunctiva, sclera.  ENT: Normal external ears, nose, hearing, lips, teeth, gums, throat. No mucous membrane lesions.  Neck: No mass or thyroid enlargement.  Resp: Breathing unlabored  MS: Right shoulder active flexion limited to 60 degrees; active abduction limited to 60 degrees.  Positive impingement signs.  Left forward elevation 120 degrees and abduction possible to 90 degrees..Fist formation intact, normal wrist ROM.  No visible swelling at MCPs or PIPs.  MTPs nontender.  Skin: No nail pitting, alopecia, rash, nodules or lesions.  Neuro: Normal cranial nerves  Psych: Normal judgement, orientation, memory, affect.    Laboratory:   RHEUM RESULTS Latest Ref Rng & Units 3/5/2015 3/6/2015 3/7/2015   ALBUMIN 3.4 - 5.0 g/dL - - -   ALT 0 - 50 U/L - - -   AST 0 - 45 U/L - - -   CREATININE 0.52 - 1.04 mg/dL 0.66 0.78 -   CRP 0.0 - 8.0 mg/L - - -   GFR ESTIMATE, IF BLACK >60 mL/min/[1.73:m2] " >90   GFR Calc   >90   GFR Calc   -   GFR ESTIMATE >60 mL/min/1.73m2 >90  Non  GFR Calc   81 -   HEMATOCRIT 35.0 - 47.0 % 40.4 40.5 38.4   HEMOGLOBIN 11.7 - 15.7 g/dL 13.5 13.4 12.3   WBC 4.0 - 11.0 10e3/uL 16.3(H) 12.8(H) 8.7   RBC 3.80 - 5.20 10e6/uL 4.46 4.44 4.19   RDW 10.0 - 15.0 % 14.6 14.6 14.4   MCHC 31.5 - 36.5 g/dL 33.4 33.1 32.0   MCV 78 - 100 fL 91 91 92   PLATELET COUNT 150 - 450 10e3/uL 315 298 285   ESR 0 - 30 mm/hr - - -       Again, thank you for allowing me to participate in the care of your patient.      Sincerely,    Panchito Saenz MD

## 2022-07-29 NOTE — NURSING NOTE
"Chief Complaint   Patient presents with     RECHECK           Inflammatory arthritis     /77   Pulse 70   Temp 97.9  F (36.6  C) (Oral)   Ht 1.626 m (5' 4\")   Wt 97.9 kg (215 lb 12.8 oz)   LMP  (LMP Unknown)   SpO2 95%   BMI 37.04 kg/m        Maine Herrmann MA  "

## 2022-08-02 ENCOUNTER — NURSE TRIAGE (OUTPATIENT)
Dept: NURSING | Facility: CLINIC | Age: 52
End: 2022-08-02

## 2022-08-02 ENCOUNTER — VIRTUAL VISIT (OUTPATIENT)
Dept: SURGERY | Facility: CLINIC | Age: 52
End: 2022-08-02
Payer: COMMERCIAL

## 2022-08-02 ENCOUNTER — TELEPHONE (OUTPATIENT)
Dept: FAMILY MEDICINE | Facility: CLINIC | Age: 52
End: 2022-08-02

## 2022-08-02 DIAGNOSIS — E66.01 CLASS 2 SEVERE OBESITY WITH SERIOUS COMORBIDITY AND BODY MASS INDEX (BMI) OF 37.0 TO 37.9 IN ADULT, UNSPECIFIED OBESITY TYPE (H): Primary | ICD-10-CM

## 2022-08-02 DIAGNOSIS — E66.812 CLASS 2 SEVERE OBESITY WITH SERIOUS COMORBIDITY AND BODY MASS INDEX (BMI) OF 37.0 TO 37.9 IN ADULT, UNSPECIFIED OBESITY TYPE (H): Primary | ICD-10-CM

## 2022-08-02 DIAGNOSIS — Z71.3 NUTRITIONAL COUNSELING: ICD-10-CM

## 2022-08-02 DIAGNOSIS — E74.39 GLUCOSE INTOLERANCE: ICD-10-CM

## 2022-08-02 DIAGNOSIS — E66.01 CLASS 2 SEVERE OBESITY WITH SERIOUS COMORBIDITY AND BODY MASS INDEX (BMI) OF 37.0 TO 37.9 IN ADULT, UNSPECIFIED OBESITY TYPE (H): ICD-10-CM

## 2022-08-02 DIAGNOSIS — E66.812 CLASS 2 SEVERE OBESITY WITH SERIOUS COMORBIDITY AND BODY MASS INDEX (BMI) OF 37.0 TO 37.9 IN ADULT, UNSPECIFIED OBESITY TYPE (H): ICD-10-CM

## 2022-08-02 PROCEDURE — 97802 MEDICAL NUTRITION INDIV IN: CPT | Mod: 95 | Performed by: DIETITIAN, REGISTERED

## 2022-08-02 NOTE — TELEPHONE ENCOUNTER
Call to pharmacy to inform of new prescription sent. Pharmacy verified they received what was needed.

## 2022-08-02 NOTE — TELEPHONE ENCOUNTER
Patient notified of provider's message as written. Patient verbalized understanding.    Encouraged patient to call the clinic with further questions/concerns.     Marlon Mahoney RN  [facility]

## 2022-08-02 NOTE — LETTER
8/2/2022         RE: Zayra Ramirez  [address] [place] [address]        Dear Colleague,    Thank you for referring your patient, Zayra Ramirez, to the [facility] SURGERY CLINIC AND BARIATRICS CARE [place]. Please see a copy of my visit note below.    Zayra Ramirez is a 52 year old who is being evaluated via a billable video visit.      How would you like to obtain your AVS? MyChart  If the video visit is dropped, the invitation should be resent by: Send to e-mail at: [email or web address]  Will anyone else be joining your video visit? No      Video Start Time: 2:17 PM      Medical Weight Loss Initial Diet Evaluation  Assessment:  Zayra is presenting today for a new weight management nutrition consultation. Pt has had an initial appointment with Dr. Hong.  Weight loss medication: Saxenda.-recently started       Anthropometrics:    Pt's weight is 216 lbs   Initial weight: 219 lbs   Weight change: 3 lbs (down)   BMI: There is no height or weight on file to calculate BMI.   Ideal body weight: 54.7 kg (120 lb 9.5 oz)  Adjusted ideal body weight: 72 kg (158 lb 10.8 oz)    Estimated RMR (Bagley-St Jeor equation):  1584 kcals x 1.2 (sedentary) = 1900 kcals (for weight maintenance)    Recommended Protein Intake: 60-80 grams of protein/day    Medical History:  Patient Active Problem List   Diagnosis     Lumbago     Facial cellulitis     Class 2 severe obesity with serious comorbidity and body mass index (BMI) of 37.0 to 37.9 in adult, unspecified obesity type (H)     Dental abscess     Hypoxia     Subcutaneous emphysema (H)     Borderline personality disorder (H)     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)     Esophageal stricture     GERD (gastroesophageal reflux disease)     HTN (hypertension)     Interstitial lung disease (H)     Moderate persistent asthma without complication     Onychomycosis     Restless leg syndrome     Seasonal allergies     Stress     Respiratory  bronchiolitis interstitial lung disease (H)     Bilateral shoulder pain     Spinal epidural abscess     Lumbar spondylosis     Inflammatory arthritis     Arthralgia of temporomandibular joint     Articular disc disorder of temporomandibular joint     Derangement of temporomandibular joint     Calculus of gallbladder without cholecystitis without obstruction     Displacement of articular disc of temporomandibular joint with reduction     Myofascial pain     Oral lesion     Symptomatic cholelithiasis     Sacro-iliac pain     Degenerative lumbar spinal stenosis     Chronic lumbar radiculopathy     Glucose intolerance     Chronic neck pain     Lumbar radiculopathy, chronic     Cervical radiculopathy     Acute pain of right shoulder      Diabetes: No  HbA1c:  No results found for: HGBA1C    Nutrition History:   Food allergies/intolerances/cultural or religous food customs: Yes -Milk, GERD-tomatoes/citrus, spicy food     Vitamins/Mineral Supplementation: Mg, Iron with Vitamin C, KCl     Dietary Recall:  Breakfast: skipped   Lunch: skipped   Dinner: beef or chicken, vegetables, occasional rice/potatoes or bread-trying to wean down    Typical Snacks: PM Snack-Nuts, HS Snack-fruit or popcorn or nuts   Overnight eating: No  Eating out: 1 time/week     Beverages: Mt Dew-2-3 cans/day, 4-6 (16 oz)/day of water     Exercise: PT exercises at home, attends PT as well outside of the home, trying to increase movement throughout the day     Nutrition Diagnosis (PES statement):   Overweight/Obesity (NC 3.3) related to overeating and poor lifestyle habits as evidenced by patient report of excessive energy intake, lack of exercise, and BMI of 37.1 kg/m2.     Nutrition Intervention  1. Food and/or Nutrient Delivery   a. Placed emphasis on importance of developing a healthy meal routine, aiming for 3 meals a day and no snacks.  2. Nutrition Education   a. Discussed with patient how to build a meal: the importance of including a lean/low  fat protein at each meal, include a source of vegetables at a minimum of lunch and dinner and limiting carbohydrate intake to <25% per meal.  b. Educated on sources of lean protein, portion sizes, the amount of grams found in each source. Recommend patient to aim for 20-30g protein at each meal.  c. Educated on how to read a food label: keeping total fat <10g and sugar <10g per serving.  d. Discussed the importance of adequate hydration, with emphasis on drinking 64oz of water or zero calorie beverages per day.  3. Nutrition Counseling   a. Encouraged importance of developing routine exercise for health benefits and weight loss.    Goals established by patient:   1. Continue to work on elimination of Mt Dew, focusing primarily on water for hydration.   2. Implement breakfast and lunch on a regular basis.   3. Focus on protein first at each meal.    Handouts provided:  Lean Protein Sources  Snack Ideas  Anti-Inflammatory Diet  Quick and Easy Meals     Assessment/Plan:    Pt will follow up in 1 month(s) with bariatrician and 2 month(s) with dietitian.         Video-Visit Details    Type of service:  Video Visit    Video End Time:2:42 PM    Originating Location (pt. Location): Home    Distant Location (provider location):  [facility] SURGERY CLINIC AND BARIATRICS CARE [place]     Platform used for Video Visit: Rosey Dobbins RD        Again, thank you for allowing me to participate in the care of your patient.        Sincerely,        Saumya Dobbins RD

## 2022-08-02 NOTE — TELEPHONE ENCOUNTER
"Medication Request  Medication name: insulin pen needle (32G X 4 MM) 32G X 4 MM miscellaneous  Requested Pharmacy: [facility] [id number]  When was patient last seen for this?:  7/26/22  Patient offered appointment:  No  Okay to leave a detailed message: yes     Per pharmacy fax: \"Patient is requesting refill with directions for once daily injections.\"    "

## 2022-08-02 NOTE — PROGRESS NOTES
Zayra Ramirez is a 52 year old who is being evaluated via a billable video visit.      How would you like to obtain your AVS? MyChart  If the video visit is dropped, the invitation should be resent by: Send to e-mail at: [email or web address]  Will anyone else be joining your video visit? No      Video Start Time: 2:17 PM      Medical Weight Loss Initial Diet Evaluation  Assessment:  Zayra is presenting today for a new weight management nutrition consultation. Pt has had an initial appointment with Dr. Hong.  Weight loss medication: Saxenda.-recently started       Anthropometrics:    Pt's weight is 216 lbs   Initial weight: 219 lbs   Weight change: 3 lbs (down)   BMI: There is no height or weight on file to calculate BMI.   Ideal body weight: 54.7 kg (120 lb 9.5 oz)  Adjusted ideal body weight: 72 kg (158 lb 10.8 oz)    Estimated RMR (Desha-St Jeor equation):  1584 kcals x 1.2 (sedentary) = 1900 kcals (for weight maintenance)    Recommended Protein Intake: 60-80 grams of protein/day    Medical History:  Patient Active Problem List   Diagnosis     Lumbago     Facial cellulitis     Class 2 severe obesity with serious comorbidity and body mass index (BMI) of 37.0 to 37.9 in adult, unspecified obesity type (H)     Dental abscess     Hypoxia     Subcutaneous emphysema (H)     Borderline personality disorder (H)     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)     Esophageal stricture     GERD (gastroesophageal reflux disease)     HTN (hypertension)     Interstitial lung disease (H)     Moderate persistent asthma without complication     Onychomycosis     Restless leg syndrome     Seasonal allergies     Stress     Respiratory bronchiolitis interstitial lung disease (H)     Bilateral shoulder pain     Spinal epidural abscess     Lumbar spondylosis     Inflammatory arthritis     Arthralgia of temporomandibular joint     Articular disc disorder of temporomandibular joint     Derangement of temporomandibular  joint     Calculus of gallbladder without cholecystitis without obstruction     Displacement of articular disc of temporomandibular joint with reduction     Myofascial pain     Oral lesion     Symptomatic cholelithiasis     Sacro-iliac pain     Degenerative lumbar spinal stenosis     Chronic lumbar radiculopathy     Glucose intolerance     Chronic neck pain     Lumbar radiculopathy, chronic     Cervical radiculopathy     Acute pain of right shoulder      Diabetes: No  HbA1c:  No results found for: HGBA1C    Nutrition History:   Food allergies/intolerances/cultural or religous food customs: Yes -Milk, GERD-tomatoes/citrus, spicy food     Vitamins/Mineral Supplementation: Mg, Iron with Vitamin C, KCl     Dietary Recall:  Breakfast: skipped   Lunch: skipped   Dinner: beef or chicken, vegetables, occasional rice/potatoes or bread-trying to wean down    Typical Snacks: PM Snack-Nuts, HS Snack-fruit or popcorn or nuts   Overnight eating: No  Eating out: 1 time/week     Beverages: Mt Dew-2-3 cans/day, 4-6 (16 oz)/day of water     Exercise: PT exercises at home, attends PT as well outside of the home, trying to increase movement throughout the day     Nutrition Diagnosis (PES statement):   Overweight/Obesity (NC 3.3) related to overeating and poor lifestyle habits as evidenced by patient report of excessive energy intake, lack of exercise, and BMI of 37.1 kg/m2.     Nutrition Intervention  1. Food and/or Nutrient Delivery   a. Placed emphasis on importance of developing a healthy meal routine, aiming for 3 meals a day and no snacks.  2. Nutrition Education   a. Discussed with patient how to build a meal: the importance of including a lean/low fat protein at each meal, include a source of vegetables at a minimum of lunch and dinner and limiting carbohydrate intake to <25% per meal.  b. Educated on sources of lean protein, portion sizes, the amount of grams found in each source. Recommend patient to aim for 20-30g protein at  each meal.  c. Educated on how to read a food label: keeping total fat <10g and sugar <10g per serving.  d. Discussed the importance of adequate hydration, with emphasis on drinking 64oz of water or zero calorie beverages per day.  3. Nutrition Counseling   a. Encouraged importance of developing routine exercise for health benefits and weight loss.    Goals established by patient:   1. Continue to work on elimination of Mt Dew, focusing primarily on water for hydration.   2. Implement breakfast and lunch on a regular basis.   3. Focus on protein first at each meal.    Handouts provided:  Lean Protein Sources  Snack Ideas  Anti-Inflammatory Diet  Quick and Easy Meals     Assessment/Plan:    Pt will follow up in 1 month(s) with bariatrician and 2 month(s) with dietitian.         Video-Visit Details    Type of service:  Video Visit    Video End Time:2:42 PM    Originating Location (pt. Location): Home    Distant Location (provider location):  [facility] [place]     Platform used for Video Visit: Rosey Dobbins, DANIEL

## 2022-08-02 NOTE — TELEPHONE ENCOUNTER
Oh yes!  She should restart with the lowest dose and escalate.     rx rewritten.     Start Saxenda at 0.6 mg injected once a day.                         Week 1:                                           0.6 mg daily                        Week 2:                                           1.2 mg daily                        Week 3:                                           1.8 mg daily(max doseif using victoza)                        Week 4:                                           2.4 mg daily                        Week 5 and on:                     3 mg daily

## 2022-08-02 NOTE — TELEPHONE ENCOUNTER
Lucie, pharmacist with [facility] calling about pt's Saxenda prescription.    Pt has contacted the pharmacy complaining of significant stomach upset since beginning the medication. The pharmacist notes that it was ordered for 3mg per day, and she says typically patients start with 0.6mg for the first week and increase by increments of 0.6mg, until they get up to the 3mg because of the GI side effects. She is wondering if the provider would like to re-write the script to allow for pt to begin at a lower dose.    The pharmacist also states that the pt got a Rx for one pen needle per week but since it is a daily injection she needs a new Rx for additional needles to have a clean one each day.    Will route message to prescribing doctor.    Shara Ha, RN, BSN  [facility]   Triage Nurse Advisor    Reason for Disposition    Pharmacy calling with prescription questions and triager unable to answer question    Protocols used: MEDICATION QUESTION CALL-A-OH

## 2022-08-03 ENCOUNTER — E-VISIT (OUTPATIENT)
Dept: FAMILY MEDICINE | Facility: CLINIC | Age: 52
End: 2022-08-03
Payer: COMMERCIAL

## 2022-08-03 ENCOUNTER — TELEPHONE (OUTPATIENT)
Dept: OTOLARYNGOLOGY | Facility: CLINIC | Age: 52
End: 2022-08-03

## 2022-08-03 DIAGNOSIS — K12.1 STOMATITIS AND MUCOSITIS: ICD-10-CM

## 2022-08-03 DIAGNOSIS — K12.30 STOMATITIS AND MUCOSITIS: ICD-10-CM

## 2022-08-03 DIAGNOSIS — B37.0 THRUSH: Primary | ICD-10-CM

## 2022-08-03 PROCEDURE — 99421 OL DIG E/M SVC 5-10 MIN: CPT | Performed by: FAMILY MEDICINE

## 2022-08-03 RX ORDER — FLUCONAZOLE 150 MG/1
150 TABLET ORAL ONCE
Qty: 1 TABLET | Refills: 0 | Status: SHIPPED | OUTPATIENT
Start: 2022-08-03 | End: 2022-08-03

## 2022-08-03 NOTE — PROGRESS NOTES
Medication Therapy Management (MTM) Encounter    ASSESSMENT:                            Class 2 severe obesity with serious comorbidity and body mass index (BMI) of 37.0 to 37.9 in adult: Patient has started Saxenda and is now tapering appropriately. We reviewed the dosing and that she can go slower if needed. She will then follow up with Dr. Hong next month.     Seronegative inflammatory arthritis: Continue to follow up with rheumatology. Continue current regimen.     Mood: Stable. Recommended to continue current regimen.     RLS: Last ferritin level at goal. Symptoms well controlled on current regimen.     GERD: Patient is waiting to hear from [facility] about an alternative. Possible recommendation was to switch to famotidine? She would like to wait for the letter from [facility] and I recommended that she then bring it to Dr. Hong to discuss further.     SOB: Patient is having breathing issues and is using albuterol often. Since she is having issues with thrush, could consider addition of a LAMA which should have the risk of thrush. Will defer to pulmonology. Until then will see if her insurance will pay for Ventolin.     Allergies: Stable. Recommended to continue current regimen.     Supplements: Ok to continue supplements. Last K, mag, Vitamin D, and hgb at goal.      PLAN:                            1. Rx sent for ventolin.     Follow-up: As needed     SUBJECTIVE/OBJECTIVE:                          Zayra Ramirez is a 52 year old female called for an initial visit. She was referred to me from Dr. Hong. Met with Indira 1/17/22    Reason for visit: Discuss Saxenda.  Medication Adherence/Access: no issues reported. Notes that she met with a pharmacist at [facility] and they are mailing her a free pill box. Down she is organizing them AM and PM in shoe boxes. She appeared familiar with her medications.     Class 2 severe obesity with serious comorbidity and body mass index (BMI) of 37.0 to 37.9 in adult: Met with   Hal on 7/26/22 and started on Saxenda. PA was approved. Patient started at 3 mg daily and was experiencing GI upset. Rx rewritten for titration. Reports she is currently on 0.6 mg. The GI upset improved after lowering the dose. She is aware of the taper schedule now. Received the needles. She is comfortable with injecting and denies any issues. No weight change yet, no significant change in appetite yet.   Wt Readings from Last 3 Encounters:   07/29/22 215 lb 12.8 oz (97.9 kg)   07/28/22 216 lb 3.2 oz (98.1 kg)   07/26/22 219 lb (99.3 kg)       Seronegative inflammatory arthritis: Met with rheum on 7/29/22. Continues methotrexate 10 tablets (25 mg) weekly and folic acid 1 mg daily. Continues PT, celebrex 200 mg daily, APAP 1000 mg three times daily, pregabalin 150 mg twice daily, Cyclobenzaprine 10 mg HS and duloxetine.  Will hopefully have surgery oct/nov. Feels like the addition of celebrex was helpful.     Mood: Continues duloxetine 60 mg x2 (120 mg) daily, Abilify 5 mg daily and buspirone 30 mg twice daily. Reports that her mood is doing well. Recently restarted Abilify and feels like it has kicked in since her mood has been better.     RLS: Continues ropinerole 0.5 mg HS. Lyrica is also helpful. Reports that between the two, her RLS is well controlled.   Last ferritin level = 149 on 9/30/20    GERD: Continues omeprazole 40 mg daily. Denies any symptoms now but has had rebound reflux and heartburn when she misses a dose. Reports the [facility] pharmacist recommended an alterantive medication for her, she is not sure what the name is.     SOB: Continues albuterol HFA PRN and montelukast 10 mg daily. Reports using albuterol at least 2-3 times a day. She cannot be on a controlled inhaler because she has been dealing with thrush for over a year. Reports that she prefers ProAir or Ventolin - she feels the generic one she has does not work as well, she needs to take two puffs.     Allergies: Continues cetirizine 10 mg  daily and montelukast. Reports allergies have been controlled lately.     Supplements: Continues Vitamin D 10,000 units daily, Vitron C, magnesium 400 mg daily, potassium 99 mg daily, turmeric 1000 mg daily, and zinc.   Patient did not tolerate iron in the past but tolerating now.   Reports K has improved since she started the K supplement.   Last Vitamin D level checked 38.6 in 2019  Potassium   Date Value Ref Range Status   07/15/2022 4.4 3.4 - 5.3 mmol/L Final     Hemoglobin   Date Value Ref Range Status   07/15/2022 12.0 11.7 - 15.7 g/dL Final     Magnesium   Date Value Ref Range Status   07/13/2022 2.0 1.6 - 2.3 mg/dL Final       Today's Vitals: LMP  (LMP Unknown)      Allergies/ADRs: Reviewed in chart  Past Medical History: Reviewed in chart  Tobacco: She reports that she quit smoking about 8 months ago. Her smoking use included cigarettes. She started smoking about 26 years ago. She has a 30.00 pack-year smoking history. She has never used smokeless tobacco.  Alcohol: reviewed in chart    ----------------      Post Medication Reconciliation Status: Discharge medications reconciled and changed, see notes/orders    I spent 20 minutes with this patient today. All changes were made via collaborative practice agreement with NICKO Lynn CNP. A copy of the visit note was provided to the patient's provider(s).    The patient was sent via [email or web address] a summary of these recommendations.     Naya Melendez, Pharm.D., BCACP   Medication Therapy Management Pharmacist   [facility]     Telemedicine Visit Details  Type of service:  Telephone visit  Start Time: 10:02 AM  End Time: 10:22 AM  Originating Location (patient location): Home  Distant Location (provider location):  [facility]     Medication Therapy Recommendations  No medication therapy recommendations to display

## 2022-08-03 NOTE — PATIENT INSTRUCTIONS
Thank you for choosing us for your care. I have placed an order for a prescription so that you can start treatment. View your full visit summary for details by clicking on the link below. Your pharmacist will able to address any questions you may have about the medication.     If you're not feeling better within 5-7 days, please schedule an appointment.  You can schedule an appointment right here in [facility], or call [phone]  If the visit is for the same symptoms as your eVisit, we'll refund the cost of your eVisit if seen within seven days.

## 2022-08-03 NOTE — TELEPHONE ENCOUNTER
[facility] Call Center    Phone Message    May a detailed message be left on voicemail: yes     Reason for Call: Other: Pt would like to know if there is any medication she can be prescribed for thrush until her next appt with Dr Mijares. She would like a phone call back to explain. Thank you.      Action Taken: Message routed to:  [facility] ENT    Travel Screening: Not Applicable

## 2022-08-04 ENCOUNTER — VIRTUAL VISIT (OUTPATIENT)
Dept: PHARMACY | Facility: CLINIC | Age: 52
End: 2022-08-04
Payer: COMMERCIAL

## 2022-08-04 DIAGNOSIS — K22.2 ESOPHAGEAL STRICTURE: ICD-10-CM

## 2022-08-04 DIAGNOSIS — F43.9 STRESS: ICD-10-CM

## 2022-08-04 DIAGNOSIS — J30.2 SEASONAL ALLERGIC RHINITIS, UNSPECIFIED TRIGGER: ICD-10-CM

## 2022-08-04 DIAGNOSIS — G25.81 RESTLESS LEG SYNDROME: ICD-10-CM

## 2022-08-04 DIAGNOSIS — E66.812 CLASS 2 SEVERE OBESITY WITH SERIOUS COMORBIDITY AND BODY MASS INDEX (BMI) OF 37.0 TO 37.9 IN ADULT, UNSPECIFIED OBESITY TYPE (H): Primary | ICD-10-CM

## 2022-08-04 DIAGNOSIS — E66.01 CLASS 2 SEVERE OBESITY WITH SERIOUS COMORBIDITY AND BODY MASS INDEX (BMI) OF 37.0 TO 37.9 IN ADULT, UNSPECIFIED OBESITY TYPE (H): Primary | ICD-10-CM

## 2022-08-04 DIAGNOSIS — Z71.89 ENCOUNTER FOR HERB AND VITAMIN SUPPLEMENT MANAGEMENT: ICD-10-CM

## 2022-08-04 DIAGNOSIS — J84.115 RESPIRATORY BRONCHIOLITIS INTERSTITIAL LUNG DISEASE (H): ICD-10-CM

## 2022-08-04 DIAGNOSIS — M19.90 INFLAMMATORY ARTHRITIS: ICD-10-CM

## 2022-08-04 PROCEDURE — 99606 MTMS BY PHARM EST 15 MIN: CPT | Performed by: PHARMACIST

## 2022-08-04 RX ORDER — TURMERIC/TURMERIC EXT/PEPR EXT 900-100 MG
1 CAPSULE ORAL DAILY
COMMUNITY
End: 2024-02-20

## 2022-08-04 RX ORDER — MAGNESIUM OXIDE 400 MG/1
400 TABLET ORAL DAILY
COMMUNITY
End: 2022-11-07

## 2022-08-04 RX ORDER — ALBUTEROL SULFATE 90 UG/1
2 AEROSOL, METERED RESPIRATORY (INHALATION) EVERY 6 HOURS PRN
Qty: 18 G | Refills: 2 | Status: SHIPPED | OUTPATIENT
Start: 2022-08-04

## 2022-08-04 NOTE — LETTER
"Recommended To-Do List      Prepared on: 8/4/2022     You can get the best results from your medications by completing the items on this \"To-Do List.\"      Bring your To-Do List when you go to your doctor. And, share it with your family or caregivers.    My To-Do List:  What we talked about: What I should do:    What my medicines are for, how to know if my medicines are working, made sure my medicines are safe for me and reviewed how to take my medicines.      Take my medicines every day                  "

## 2022-08-04 NOTE — LETTER
_  Medication List        Prepared on: 8/4/2022     Bring your Medication List when you go to the doctor, hospital, or   emergency room. And, share it with your family or caregivers.     Note any changes to how you take your medications.  Cross out medications when you no longer use them.    Medication How I take it Why I use it Prescriber   albuterol (PROAIR HFA/PROVENTIL HFA/VENTOLIN HFA) 108 (90 Base) MCG/ACT inhaler Inhale 2 puffs into the lungs every 6 hours as needed for shortness of breath / dyspnea or wheezing Ventolin please Respiratory bronchiolitis interstitial lung disease  NICKO Martinez CNP   ARIPiprazole (ABILIFY) 5 MG tablet Take 5 mg by mouth daily Mood  NICKO Martinez   Black Pepper-Turmeric (TURMERIC CURCUMIN) 5-1000 MG CAPS Take 1 capsule by mouth daily General health  NICKO Martinez   busPIRone HCl (BUSPAR) 30 MG tablet Take 30 mg by mouth 2 times daily  Mood NICKO Martinez   carboxymethylcellulose (CARBOXYMETHYLCELLULOSE SODIUM) 0.5 % SOLN ophthalmic solution Place 1 drop into both eyes 3 times daily as needed for dry eyes Dry eye syndrome  Leodan Gan MD   celecoxib (CELEBREX) 200 MG capsule Take 1 capsule (200 mg) by mouth every morning Chronic Neck Pain Panchito Saenz MD   cetirizine (ZYRTEC) 10 MG tablet Take 1 tablet (10 mg) by mouth daily Seasonal allergic rhinitis NICKO Martinez    cyclobenzaprine (FLEXERIL) 10 MG tablet Take 1 tablet (10 mg) by mouth At Bedtime Spinal Epidural Abscess NICKO Martinez    DULoxetine (CYMBALTA) 60 MG capsule Take 120 mg by mouth At Bedtime  Mood NICKO Martinez   EPINEPHrine (EPIPEN/ADRENACLICK/OR ANY BX GENERIC EQUIV) 0.3 MG/0.3ML injection 2-pack Use as directed  Bee allergy NICKO Martinez   folic acid (FOLVITE) 1 MG tablet Take 1 tablet (1 mg) by mouth daily History of seronegative inflammatory arthritis Panchito Saenz MD   insulin pen needle (32G X 4 MM) 32G X 4 MM miscellaneous  Use 1 NEEDLE daily to use with Saxenda Class 2 severe obesity  NICKO Martinez    liraglutide - Weight Management (SAXENDA) 18 MG/3ML pen Inject 0.6 mg Subcutaneous daily for 7 days, THEN 1.2 mg daily for 7 days, THEN 1.8 mg daily for 7 days, THEN 2.4 mg daily for 7 days, THEN 3 mg daily for 30 days. Class 2 severe obesity   Dorothy Hong MD   magnesium oxide (MAG-OX) 400 MG tablet Take 400 mg by mouth daily General health  NICKO Martinez   methotrexate sodium 2.5 MG TABS Take 10 tablets (25 mg) by mouth once a week MONDAYS History of seronegative inflammatory arthritis Panchito Saenz MD   montelukast (SINGULAIR) 10 MG tablet Take 10 mg by mouth At Bedtime Allergies NICKO Martinez   omeprazole (PRILOSEC) 40 MG DR capsule Take 1 capsule (40 mg) by mouth daily Gastroesophageal reflux disease Abhi Villafuerte PA-C   OXYGEN-HELIUM IN 2-3L PRN during the day, 3L @ night    Oxygen only not helium PM only NICKO Martinez   potassium 99 MG TABS Take 1 tablet by mouth daily General health  NICKO Martinez   pregabalin (LYRICA) 150 MG capsule Take 1 capsule (150 mg) by mouth 2 times daily Restless Leg Syndrome Bennett Ezra Goltz, PA-C   rOPINIRole (REQUIP) 0.5 MG tablet Take 1 tablet (0.5 mg) by mouth At Bedtime Take 1 tab by mouth at bedtime. Restless Leg Syndrome Bennett Ezra Goltz, PA-C   vitamin D3 (CHOLECALCIFEROL) 250 mcg (53790 units) capsule Take 1 capsule by mouth daily General health  NICKO Martinez   zinc sulfate (ZINCATE) 220 (50 Zn) MG capsule Take 220 mg by mouth daily (with lunch)  General health  NICKO Rosado         Add new medications, over-the-counter drugs, herbals, vitamins, or  minerals in the blank rows below.    Medication How I take it Why I use it Prescriber                          Allergies:      bee venom; doxycycline; erythromycin; hydrocodone-acetaminophen        Side effects I have had:               Other  Information:              My notes and questions:

## 2022-08-04 NOTE — LETTER
August 4, 2022  Zayra Ramirez  [id number] [place] [address]    Dear Ms. Ramirez, [facility]        Thank you for talking with me on Aug 4, 2022 about your health and medications. As a follow-up to our conversation, I have included two documents:      1. Your Recommended To-Do List has steps you should take to get the best results from your medications.  2. Your Medication List will help you keep track of your medications and how to take them.    If you want to talk about these documents, please call Naya Melendez PharmD at phone: [phone], Monday-Friday 8-4:30pm.    I look forward to working with you and your doctors to make sure your medications work well for you.    Sincerely,    Naya Melendez PharmD  [facility] Pharmacist, [facility]

## 2022-08-04 NOTE — TELEPHONE ENCOUNTER
This writer called patient back regarding her concerns with thrush. Patient not available so voicemail message left for patient.    Supplied patient with this writer's callback number and reason for phone call.    Patient received a dose of Diflucan yesterday from another provider. Patient has previously received Mycelex from Dr. Mijares for this condition. A sooner appointment was offered to the patient. Could consider additional options. Will allow patient to call back to discuss otherwise we will see her back in the clinic as scheduled on 8/16/2022.    Nothing further needed at this time.    MARÍA HARRIS, JENNA on 8/4/2022 at 2:27 PM

## 2022-08-05 ENCOUNTER — THERAPY VISIT (OUTPATIENT)
Dept: PHYSICAL THERAPY | Facility: CLINIC | Age: 52
End: 2022-08-05
Payer: COMMERCIAL

## 2022-08-05 DIAGNOSIS — M54.50 LUMBAGO: ICD-10-CM

## 2022-08-05 DIAGNOSIS — M54.16 CHRONIC LUMBAR RADICULOPATHY: Primary | ICD-10-CM

## 2022-08-05 PROCEDURE — 97110 THERAPEUTIC EXERCISES: CPT | Mod: GP

## 2022-08-05 RX ORDER — DEXAMETHASONE 0.5 MG/5ML
1 SOLUTION ORAL 2 TIMES DAILY
Qty: 200 ML | Refills: 0 | Status: SHIPPED | OUTPATIENT
Start: 2022-08-05 | End: 2022-09-06

## 2022-08-05 NOTE — TELEPHONE ENCOUNTER
Received response from provider Dr. Hair. Provider is approving the refill of patient's Decadron rinse.    Signed Prescriptions:                        Disp   Refills    dexamethasone alcohol-free (DECADRON) 0.1 *200 mL 0        Sig: Swish and spit 10 mLs (1 mg) in mouth 2 times daily  Authorizing Provider: MICHAEL HAIR  Ordering User: STEVEN DANIEL    Patient called and this writer LVM to advise her that the prescription has been sent to her pharmacy and to call back should she have any additional questions or concerns while taking the medication.    Nothing further needed at this time.    MARÍA HARRIS, JENNA on 8/5/2022 at 3:56 PM

## 2022-08-05 NOTE — TELEPHONE ENCOUNTER
Patient called this writer back regarding the message left yesterday for her regarding her thrush concerns.    Patient received one dose of Diflucan and has been using her Mycelex troches which she states has been giving her improvement.    Patient is requesting a refill of the rinse, which she initially thought was magic mouthwash. Reviewed with patient that it actually was a Decadron rinse. Given it is a steroid, this writer advised patient that we would reach out to Dr. Mijares about a new prescription for this.    Patient was advised that [facility].    Patient verbalizes understanding of this plan and is in agreement. Patient has no further questions at this time.    MARÍA HARRIS LPN on 8/5/2022 at 10:35 AM

## 2022-08-12 ENCOUNTER — THERAPY VISIT (OUTPATIENT)
Dept: PHYSICAL THERAPY | Facility: CLINIC | Age: 52
End: 2022-08-12
Payer: COMMERCIAL

## 2022-08-12 DIAGNOSIS — M54.50 LUMBAGO: ICD-10-CM

## 2022-08-12 DIAGNOSIS — M54.16 CHRONIC LUMBAR RADICULOPATHY: Primary | ICD-10-CM

## 2022-08-12 PROCEDURE — 97110 THERAPEUTIC EXERCISES: CPT | Mod: GP | Performed by: PHYSICAL THERAPIST

## 2022-08-12 PROCEDURE — 97112 NEUROMUSCULAR REEDUCATION: CPT | Mod: GP | Performed by: PHYSICAL THERAPIST

## 2022-08-16 ENCOUNTER — OFFICE VISIT (OUTPATIENT)
Dept: OTOLARYNGOLOGY | Facility: CLINIC | Age: 52
End: 2022-08-16
Payer: COMMERCIAL

## 2022-08-16 VITALS
DIASTOLIC BLOOD PRESSURE: 59 MMHG | OXYGEN SATURATION: 94 % | BODY MASS INDEX: 35.51 KG/M2 | WEIGHT: 208 LBS | HEIGHT: 64 IN | TEMPERATURE: 97.1 F | SYSTOLIC BLOOD PRESSURE: 105 MMHG | HEART RATE: 53 BPM

## 2022-08-16 DIAGNOSIS — K12.30 STOMATITIS AND MUCOSITIS: Primary | ICD-10-CM

## 2022-08-16 DIAGNOSIS — K12.1 STOMATITIS AND MUCOSITIS: Primary | ICD-10-CM

## 2022-08-16 PROCEDURE — 99213 OFFICE O/P EST LOW 20 MIN: CPT | Performed by: OTOLARYNGOLOGY

## 2022-08-16 RX ORDER — LISINOPRIL AND HYDROCHLOROTHIAZIDE 20; 25 MG/1; MG/1
1 TABLET ORAL DAILY
COMMUNITY
End: 2022-08-23

## 2022-08-16 ASSESSMENT — PAIN SCALES - GENERAL: PAINLEVEL: NO PAIN (0)

## 2022-08-16 NOTE — PATIENT INSTRUCTIONS
"You were seen in the clinic today by Dr. Mijares. If you have any questions or concerns after your appointment, please call the clinic at [phone]. Press \"1\" for scheduling, press \"3\" for nurse advice.    2.   The following has been recommended for you based upon your appointment today:   -Watch for recurrence of the tongue lesions. Contact us should you have any concerns.    3.   Plan to return the clinic in 6 months.       Marley Gary LPN  [facility]  Department of Otolaryngology  [phone]   "

## 2022-08-16 NOTE — LETTER
"2022       RE: Zayra Ramirez  [address] [place] [address]     Dear Colleague,    Thank you for referring your patient, Zayra Ramirez, to the [facility] EAR NOSE AND THROAT CLINIC [place] at [facility]. Please see a copy of my visit note below.      Otolaryngology Clinic    Name: Zayra Ramirez  MRN: [id number]  Age: 52 year old  : 2022      Chief Complaint:   Follow up     History of Present Illness:   Zayra Ramirez is a 52 year old female with history of invasive oral fungal infections and on methotrexate for rheumatoid arthritis presenting for follow-up.At our last appointment on 22 we did discuss her methotrexate increase as well as ordering her some Mycelex nakul for the invasive fungal infection. She notes that her invasive fungal infection is improving. She does not have to scrap off fungus from the top of her mouth. She bought some cheep tooth brushes to remove the areas of fungus and then is throwing them away. She does note that she is not brushing the top of her mouth until she bleeds. She notes that she quit smoking last November.     Review of Systems:   Pertinent items are noted in HPI or as in patient entered ROS below, remainder of complete ROS is negative.    ENT ROS 2022   Constitutional: -   Neurology: -   Psychology: -   Eyes: -   Ears, Nose, Throat: Ringing/noise in ears, Sore throat, Trouble swallowing, Hoarseness   Cardiopulmonary: -   Gastrointestinal/Genitourinary: Heartburn/indigestion   Musculoskeletal: Sore or stiff joints, Swollen joints, Back pain, Neck pain   Allergy/Immunology: Allergies or hay fever   Hematologic: -   Endocrine: -   Skin: -   Other: -        Physical Exam:   /59 (BP Location: Right arm, Patient Position: Sitting, Cuff Size: Adult Regular)   Pulse 53   Temp 97.1  F (36.2  C) (Temporal)   Ht 1.626 m (5' 4\")   Wt 94.3 kg (208 lb)   LMP  (LMP " Unknown)   SpO2 94%   BMI 35.70 kg/m       PHYSICAL EXAMINATION:    Constitutional:  The patient was unaccompanied, well-groomed, and in no acute distress.    Skin:  Warm and pink.    Neurologic:  Alert and oriented x 3.  CN's III-XII within normal limits.  Voice normal.   Psychiatric:  The patient's affect was calm, cooperative, and appropriate.    Respiratory:  Breathing comfortably without stridor or exertion of accessory muscles.    Eyes: Extraocular movement intact.    Head:  Normocephalic and atraumatic.  No lesions or scars.    Ears:  Pinnae and tragus non-tender.  EAC's and TM's were clear.    Nose:  Sinuses were non-tender.  Anterior rhinoscopy revealed midline septum and absence of purulence or polyps.    OC/OP:  Normal tongue, floor of mouth, buccal mucosa, and palate.  No lesions or masses on inspection or palpation.  No abnormal lymph tissue in the oropharynx.  The pterygoid region is non-tender. Mouth is generally dry and small pin point 1-2 mm lesions along the inner aspect of the upper alveolar ridge.  Neck:  Supple with normal laryngeal and tracheal landmarks.  The parotid beds were without masses.  No palpable thyroid.  Lymphatic:  There is no palpable lymphadenopathy in the neck.      Assessment and Plan:  No diagnosis found.  Zayra Ramirez is a 52 year old female with history of invasive oral fungal infections and on methotrexate for rheumatoid arthritis presenting for follow-up. I discussed concern for fungal infection of the ulcers in her mouth, in the setting of her immune suppression. We discussed that she should be careful and gentle in how she brushes her teeth with concern of breaking skin in her mouth. On physical exam there were small pin point spots of fungal infection present, but it looks to be improving and we will continue current plan of treatment. We set up a plan for a 6 month follow up by phone call to assess improvement of her fungal infection.    Scribe Disclosure:  I,  Jeremy Trujillo, am serving as a scribe to document services personally performed by Morro Mijares MD at this visit, based upon the provider's statements to me. All documentation has been reviewed by the aforementioned provider prior to being entered into the official medical record.       Again, thank you for allowing me to participate in the care of your patient.      Sincerely,    Morro Mijares MD

## 2022-08-16 NOTE — PROGRESS NOTES
"  Otolaryngology Clinic    Name: Zayra Ramirez  MRN: [id number]  Age: 52 year old  : 2022      Chief Complaint:   Follow up     History of Present Illness:   Zayra Ramirez is a 52 year old female with history of invasive oral fungal infections and on methotrexate for rheumatoid arthritis presenting for follow-up.At our last appointment on 22 we did discuss her methotrexate increase as well as ordering her some Mycelex nakul for the invasive fungal infection. She notes that her invasive fungal infection is improving. She does not have to scrap off fungus from the top of her mouth. She bought some cheep tooth brushes to remove the areas of fungus and then is throwing them away. She does note that she is not brushing the top of her mouth until she bleeds. She notes that she quit smoking last November.     Review of Systems:   Pertinent items are noted in HPI or as in patient entered ROS below, remainder of complete ROS is negative.    ENT ROS 2022   Constitutional: -   Neurology: -   Psychology: -   Eyes: -   Ears, Nose, Throat: Ringing/noise in ears, Sore throat, Trouble swallowing, Hoarseness   Cardiopulmonary: -   Gastrointestinal/Genitourinary: Heartburn/indigestion   Musculoskeletal: Sore or stiff joints, Swollen joints, Back pain, Neck pain   Allergy/Immunology: Allergies or hay fever   Hematologic: -   Endocrine: -   Skin: -   Other: -        Physical Exam:   /59 (BP Location: Right arm, Patient Position: Sitting, Cuff Size: Adult Regular)   Pulse 53   Temp 97.1  F (36.2  C) (Temporal)   Ht 1.626 m (5' 4\")   Wt 94.3 kg (208 lb)   LMP  (LMP Unknown)   SpO2 94%   BMI 35.70 kg/m       PHYSICAL EXAMINATION:    Constitutional:  The patient was unaccompanied, well-groomed, and in no acute distress.    Skin:  Warm and pink.    Neurologic:  Alert and oriented x 3.  CN's III-XII within normal limits.  Voice normal.   Psychiatric:  The patient's affect was calm, " cooperative, and appropriate.    Respiratory:  Breathing comfortably without stridor or exertion of accessory muscles.    Eyes: Extraocular movement intact.    Head:  Normocephalic and atraumatic.  No lesions or scars.    Ears:  Pinnae and tragus non-tender.  EAC's and TM's were clear.    Nose:  Sinuses were non-tender.  Anterior rhinoscopy revealed midline septum and absence of purulence or polyps.    OC/OP:  Normal tongue, floor of mouth, buccal mucosa, and palate.  No lesions or masses on inspection or palpation.  No abnormal lymph tissue in the oropharynx.  The pterygoid region is non-tender. Mouth is generally dry and small pin point 1-2 mm lesions along the inner aspect of the upper alveolar ridge.  Neck:  Supple with normal laryngeal and tracheal landmarks.  The parotid beds were without masses.  No palpable thyroid.  Lymphatic:  There is no palpable lymphadenopathy in the neck.      Assessment and Plan:  No diagnosis found.  Zayra Ramirez is a 52 year old female with history of invasive oral fungal infections and on methotrexate for rheumatoid arthritis presenting for follow-up. I discussed concern for fungal infection of the ulcers in her mouth, in the setting of her immune suppression. We discussed that she should be careful and gentle in how she brushes her teeth with concern of breaking skin in her mouth. On physical exam there were small pin point spots of fungal infection present, but it looks to be improving and we will continue current plan of treatment. We set up a plan for a 6 month follow up by phone call to assess improvement of her fungal infection.    Scribe Disclosure:  I, Jeremy Trujillo, am serving as a scribe to document services personally performed by Morro Mijares MD at this visit, based upon the provider's statements to me. All documentation has been reviewed by the aforementioned provider prior to being entered into the official medical record.

## 2022-08-16 NOTE — NURSING NOTE
"Chief Complaint   Patient presents with     RECHECK     Thrush came back       Blood pressure 105/59, pulse 53, temperature 97.1  F (36.2  C), temperature source Temporal, height 1.626 m (5' 4\"), weight 94.3 kg (208 lb), SpO2 94 %, not currently breastfeeding.    Mabel Obrien, EMT    "

## 2022-08-17 ENCOUNTER — MYC MEDICAL ADVICE (OUTPATIENT)
Dept: FAMILY MEDICINE | Facility: CLINIC | Age: 52
End: 2022-08-17

## 2022-08-17 DIAGNOSIS — E66.01 CLASS 2 SEVERE OBESITY WITH SERIOUS COMORBIDITY AND BODY MASS INDEX (BMI) OF 37.0 TO 37.9 IN ADULT, UNSPECIFIED OBESITY TYPE (H): Primary | ICD-10-CM

## 2022-08-17 DIAGNOSIS — E66.812 CLASS 2 SEVERE OBESITY WITH SERIOUS COMORBIDITY AND BODY MASS INDEX (BMI) OF 37.0 TO 37.9 IN ADULT, UNSPECIFIED OBESITY TYPE (H): Primary | ICD-10-CM

## 2022-08-20 DIAGNOSIS — I10 ESSENTIAL (PRIMARY) HYPERTENSION: ICD-10-CM

## 2022-08-23 RX ORDER — LISINOPRIL AND HYDROCHLOROTHIAZIDE 20; 25 MG/1; MG/1
1 TABLET ORAL DAILY
Qty: 90 TABLET | Refills: 3 | Status: ON HOLD | OUTPATIENT
Start: 2022-08-23 | End: 2022-12-06

## 2022-08-23 NOTE — TELEPHONE ENCOUNTER
Last Clinic Visit: 7/28/2022  [facility] Internal Medicine [place]    *FYI: Zestoretic last written/PCC 7/20/2021  Disp:90 R:3

## 2022-08-25 DIAGNOSIS — Z87.39 HISTORY OF SERONEGATIVE INFLAMMATORY ARTHRITIS: ICD-10-CM

## 2022-08-26 ENCOUNTER — THERAPY VISIT (OUTPATIENT)
Dept: PHYSICAL THERAPY | Facility: CLINIC | Age: 52
End: 2022-08-26
Payer: COMMERCIAL

## 2022-08-26 DIAGNOSIS — M47.816 LUMBAR SPONDYLOSIS: Primary | ICD-10-CM

## 2022-08-26 DIAGNOSIS — M54.16 CHRONIC LUMBAR RADICULOPATHY: Primary | ICD-10-CM

## 2022-08-26 DIAGNOSIS — M54.50 LUMBAGO: ICD-10-CM

## 2022-08-26 PROCEDURE — 97112 NEUROMUSCULAR REEDUCATION: CPT | Mod: GP | Performed by: PHYSICAL THERAPIST

## 2022-08-26 PROCEDURE — 97110 THERAPEUTIC EXERCISES: CPT | Mod: GP | Performed by: PHYSICAL THERAPIST

## 2022-08-26 NOTE — Clinical Note
Zayra has not noted much change in her back pain but she does feel she has been able to walk some what further;  she does benefit from strengthening and I have encouraged her to continue PT to continuing trying to optimize her core, back and hip strength.  Thank you for referring her to PT! Danni

## 2022-08-29 ENCOUNTER — ANCILLARY PROCEDURE (OUTPATIENT)
Dept: GENERAL RADIOLOGY | Facility: CLINIC | Age: 52
End: 2022-08-29
Attending: ORTHOPAEDIC SURGERY
Payer: COMMERCIAL

## 2022-08-29 DIAGNOSIS — M47.816 LUMBAR SPONDYLOSIS: ICD-10-CM

## 2022-08-29 PROCEDURE — 72100 X-RAY EXAM L-S SPINE 2/3 VWS: CPT | Performed by: STUDENT IN AN ORGANIZED HEALTH CARE EDUCATION/TRAINING PROGRAM

## 2022-08-29 RX ORDER — METHOTREXATE 2.5 MG/1
25 TABLET ORAL WEEKLY
Qty: 120 TABLET | Refills: 2 | Status: ON HOLD | OUTPATIENT
Start: 2022-08-29 | End: 2022-12-06

## 2022-08-29 NOTE — TELEPHONE ENCOUNTER
Medication/Dose: methotrexate sodium 2.5 MG TABS, pharmacy requesting 90 day supply    Last Written : 7/29/22  Last Quantity: 40, # refills: 5  Last Office Visit :  7/29/22  Pending appointment: 12/1/22       Prescription set up and routed to provider as pharmacy requesting 90 day supply.    Dung Hobbs, EBONIEN, RN

## 2022-08-29 NOTE — PROGRESS NOTES
DISCHARGE  REPORT    Progress reporting period is from 6-10-22 to 8-26-22.       SUBJECTIVE  Subjective changes noted by patient:  .  Subjective: Having some increased back pain recently--mid back has felt increasingly painful as she has been attempting to strengthen her core and mid back. Bands seem somewhat helpful.    Current pain level is 6/10  .     Previous pain level was  6/10 Initial Pain level: 6/10.   Changes in function:  None  Adverse reaction to treatment or activity: activity - increasing back pain with PT.    OBJECTIVE  Changes noted in objective findings:  The objective findings below are from DOS 8-26-22.  Objective: Lx ROM Flex=mid shin, pdm, EXT=mod to major loss, Thoracic ROM: R Rot=mod loss, L Rot=mod to major loss, EXT=mod to major loss; core strength is fair to poor, bilateral hip strength is fair to poor; KATIE=64, STarT Back=MEDIUM.     ASSESSMENT/PLAN  Updated problem list and treatment plan: Diagnosis 1:  Flatback syndrome  Pain -  self management, education and home program  Decreased ROM/flexibility - manual therapy and therapeutic exercise  Decreased joint mobility - manual therapy and therapeutic exercise  Decreased strength - therapeutic exercise and therapeutic activities  Impaired muscle performance - neuro re-education  Decreased function - therapeutic activities  Impaired posture - neuro re-education  STG/LTGs have been met or progress has been made towards goals:  None  Assessment of Progress: The patient's condition is unchanged.  Self Management Plans:  Patient is independent in a home treatment program.  Patient is independent in self management of symptoms.  I have re-evaluated this patient and find that the nature, scope, duration and intensity of the therapy is appropriate for the medical condition of the patient.  Zayra continues to require the following intervention to meet STG and LTG's:  PT    Recommendations:  Discharge to [facility] as patient did not return to PT.      This  patient would benefit from continued therapy to continue to address her areas of weakness.     Frequency:  1 X week, once daily  Duration:  for 4 weeks      This patient would benefit from further evaluation.    Please refer to the daily flowsheet for treatment today, total treatment time and time spent performing 1:1 timed codes.

## 2022-08-30 ENCOUNTER — VIRTUAL VISIT (OUTPATIENT)
Dept: ORTHOPEDICS | Facility: CLINIC | Age: 52
End: 2022-08-30
Payer: COMMERCIAL

## 2022-08-30 DIAGNOSIS — M54.16 LUMBAR RADICULOPATHY: ICD-10-CM

## 2022-08-30 DIAGNOSIS — M47.816 LUMBAR SPONDYLOSIS: ICD-10-CM

## 2022-08-30 DIAGNOSIS — M40.35 FLATBACK SYNDROME OF THORACOLUMBAR REGION: Primary | ICD-10-CM

## 2022-08-30 DIAGNOSIS — M48.062 SPINAL STENOSIS OF LUMBAR REGION WITH NEUROGENIC CLAUDICATION: ICD-10-CM

## 2022-08-30 PROCEDURE — 99215 OFFICE O/P EST HI 40 MIN: CPT | Mod: 95 | Performed by: ORTHOPAEDIC SURGERY

## 2022-08-30 RX ORDER — DIAZEPAM 5 MG
10 TABLET ORAL
Qty: 2 TABLET | Refills: 0 | Status: SHIPPED | OUTPATIENT
Start: 2022-08-30 | End: 2022-11-07

## 2022-08-30 NOTE — PROGRESS NOTES
"Zayra is a 52 year old who is being evaluated via a billable video visit.      How would you like to obtain your AVS? MyChart  If the video visit is dropped, the invitation should be resent by: Text to cell phone: [phone]  Will anyone else be joining your video visit? No  {If patient encounters technical issues they should call [phone] :[id number]}      Video-Visit Details    Video Start Time: {video visit start/end time for provider to select:190845}    Type of service:  Video Visit    Video End Time:{video visit start/end time for provider to select:152309}    Originating Location (pt. Location): {video visit patient location:446475::\"Home\"}    Distant Location (provider location):  [facility]     Platform used for Video Visit: {Virtual Visit Platforms:114455::\"MaxTraffic\"}  "

## 2022-08-30 NOTE — PROGRESS NOTES
"Spine Surgical Hx:  10/16/2017 - ACDF with plate fixation C3-C5 (2 levels); use of Cornerstone allograft (Dr. Rylan Monique, [facility]).  [Implants: Medtronic Zevo plate].  08/08/2019 - SCS implantation ([place] [facility]); complicated by epidural abscess MSSA culture positive, treated with drainage/laminectomy and IV antibiotics.  Thus, the SCS was removed.  08/19/2019 - Laminectomies T11-L1 (T12-L2) ([facility]).     BMD Hx:  11/24/21 - DEXA spine/hip/wrist.  T-score = -1.1 (Left femoral neck).  Imp: Osteopenia.  12/8/21 - Saw Dr. Guerra ([facility]).  P> RTC 6 wks; had not been seen since.      VIRTUAL VISIT:  Patient is evaluated today via billable virtual visit.    The patient has been notified of following:   \"This virtual visit will be conducted via a call between you and your physician/provider. We have found that certain health care needs can be provided without the need for an in-person physical exam.  This service lets us provide the care you need with a virtual conversation.  If a prescription is necessary we can send it directly to your pharmacy.  If lab work is needed we can place an order for that and you can then stop by our lab to have the test done at a later time.  If during the course of the call the physician/provider feels a virtual visit is not appropriate, you will not be charged for this service.\"     Physician has received verbal consent for a Virtual Visit from the patient.  Platform used:  Telephone  Time:  2:06pm to 2:29pm  Originating Location (pt. Location): Home  Distant Location (provider location):  [facility] ORTHOPEDIC [facility]IN[facility] [place]     Chief Complaint   Patient presents with     follow up      Osteopenia        Last Visit Date: 6/28/22  Previous Impression:  51/f RHD with:  Lumbar spine:  1.  Multilevel lumbar spondylosis with stenosis, L1-S1, with neurogenic claudication.  2.  Spinal sagittal malalignment with thoracolumbar kyphosis (T10-L2= 24 deg kyphosis) and lumbar " flatback deformity (LL = 42, ideal 48.5).  3.  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  4.  S/p laminectomies T11-L1 (8/19/19, Dr. Lian Causey).  5.  Smoking history x 35 yrs, quit in November 2021.  6.  Class II obesity (BMI 39.50).  Cervical spine:  1.  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  2.  S/p ACDF C3-C5 (10/16/2017 [facility]).  Previous Plan:  She continues to struggle with back and bilateral shoulder symptoms.  She is currently undergoing physical therapy for her shoulder; she stopped physical therapy for her back in February.  She is open to doing more physical therapy for her back.  She also has neck problems, but currently not as bad as shoulders or lower back.  At the same time, she is struggling with mental health issues.  She also does not seem to have lost significant weight compared to our last visit.  On a good note, however, she has been off smoking since November, and I congratulated her for this.  Overall, I do not think now is a good time to proceed with big multilevel spinal fusion likely from T10 down to pelvis.  At last visit, I thought about possibly stopping at L4 as SUMIT.  However, CT scan shows advanced spondylosis of the lower levels; thus, if she would need fusion surgery, it would have to go down to the sacrum/pelvis.  - Referral to Weight mgmt clinic.  - PT for low back (currently doing PT for shoulders at [facility]).  -We will try to get her reconnected to [facility] (Dr. Guerra), as she had not been seen since December 2021 clinic visit.  In basket message sent to Dr. Guerra.  - Encouraged to continue with Mental Health therapy.  In person return to clinic in 3 months for reevaluation, with repeat EOS full spine AP lateral standing x-rays and lumbar flexion-extension x-rays.      S>  52 year old female, RHD, here to review response to PT and weight mgmt, and discuss further options.    Considers her symptoms to be further  worsening over time.  Unhappy with current symptoms, which she considers very disabling, and significantly limiting her quality of life.    Did PT for her back at [facility].  Per patient, only temporary relief, and overall did not really help.  Last Friday was her last visit.    Seen at [facility] clinic (Dr. Dorothy Hong).  Was given medication (Liraglutide or SAXENDA - daily injections).  Down 8 lbs since she started with them.  Currently at 208 lbs, which is somewhat less than when I last saw her.    Had been seeing her psych counselor.  Helping, per patient.    Has appointment with Dr. Guerra scheduled for tomorrow.    Previous injections:  1/10/19 - RFA L3-L5 (Dr. Dan Sipple, [facility]).  3/18/19 - R L5-S1 TFESI  12/17/19 - RFA bilateral L3-4, L4-5 and L5-S1 (Dr. Florian).  6/16/20 - Bilateral SIJ injection with steroid ([facility])  9/15/20 - L5-S1 IL EDUARDO ([facility]iqui)  11/17/20 - RFA Right L3-4, L4-5, L5-S1 ([facility]iqui).   6/29/21 - L5-S1 IL EDUARDO (Chiqui)  10/14/21 - C7-T1 IL EDUARDO ([facility]iqui)    Oswestry (KATIE) Questionnaire    OSWESTRY DISABILITY INDEX 8/28/2022   Count 10   Sum 35   Oswestry Score (%) 70   Some recent data might be hidden      KATIE 11/8/21       64%  KATIE 6/27/22       64%      Neck Disability Index (NDI) Questionnaire    Neck Disability Index (NDI) 6/27/2022   Neck Disability Index: Count 9   NDI: Total Score = SUM (points for all 10 findings) 23   Neck Disability in Percent = (Total Score) / 50 * 100 51.11 (%)   Some recent data might be hidden      NDI 6/27/22       51.11%    PROMIS-10 Scores  Global Mental Health Score: (P) 11  Global Physical Health Score: (P) 11  PROMIS TOTAL - SUBSCORES: (P) 22    Physical Examination:    This was a virtual visit, so very limited exam could be performed.  Patient seemed alert, oriented x 3, cooperative, with coherent speech, and not in distress.  Able to respond to questions appropriately and follow instructions.    Imaging:    EOS full spine ap-lat x-rays taken  today show worsening of thoracic/TL junction kyphosis compared to full body x-rays taken 9 months ago November 2021.  Lumbar lordosis decreased from 42 degrees to 31 degrees.  L4-S1 decreased from 40 degrees to 35 degrees.  Pelvic tilt increased from 12 degrees to 18 degrees.  SVA increased from 2.1 to 4.8 cm.  T1 pelvic angle increased from 10 to 17 degrees; global tilt increased from 13 degrees to 23 degrees.  T10-L2 kyphosis increased from 24degrees to 31 degrees.  Sagittal measurements:  LL 31, ideal 49  L4-S1 35, ideal 33  PI 42  PT 18  SVA +4.8cm  TPA 17  GT 23  T10-L2 kyph 31    Lumbar flexion-extension x-rays taken today show advanced multilevel lumbar spondylosis; thoracolumbar junction kyphosis, mainly secondary to disc collapse with autofusion in kyphotic alignment at T12-L1.  No radiographic instability on dynamic films.    Assessment:    51/f RHD with:  Lumbar spine:  1.  Multilevel lumbar spondylosis with stenosis, L1-S1, with neurogenic claudication.  2.  Progressive spinal sagittal malalignment (progression between 11/2021 and 08/2022 x-rays) with thoracolumbar kyphosis (T10-L2= 31 deg kyphosis) and lumbar flatback deformity (LL = 31, ideal 49).  3.  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  4.  S/p laminectomies T11-L1 (8/19/19, Dr. Lian Causey).  5.  Smoking history x 35 yrs, quit in November 2021.  6.  Class II obesity (current BMI 35.7 on 8/16/22, decreased from 39.8 on 6/2/22).  7.  Osteopenia (DEXA 11/24/21; T-score = -1.1).  Cervical spine:  1.  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  2.  S/p ACDF C3-C5 (10/16/2017 Kay[facility]).    Plan:    Had good long discussion with the patient.  We discussed her spinal condition and treatment options.  X-rays taken yesterday show progression of sagittal spinal deformity compared to x-rays from 9 months ago.  Main problem is that of thoracolumbar junction kyphosis, secondary to T12-L1 autofusion with kyphotic  collapse.  At same time, has advanced multilevel lumbar spondylosis and stenosis.  Correction of this problem will require multilevel spinal fusion.  Since the main deformity, and thus the fulcrum of correction is at the thoracolumbar junction, we may not be able to simply stop at T10, and will likely need to go up higher, probably T8.    At present, patient considers her symptoms quite disabling.  She had already tried extensive nonoperative treatment, including physical therapy, multiple injections, anti-inflammatory medication, home exercise program, activity modification, etc.  She had also been working on reducing weight with our weight management clinic, currently on medication.  She also has been seeing a psychological, psychiatric counselor.  She considers her mental health issues stable.  - Case request:  PISF T8 to pelvis, with multilevel TLIF-SPO L1-S1 (5 levels); poss cement augmentation of screws; use of Infuse BMP Large kit and allograft.  - PAC referral.  - Updated lumbar MRI (last one 08/2021); thoracic MRI.  Will need sedation (will prescribe valium 10 mg PO).    40 minutes spent on the date of the encounter doing chart review/review of outside records/review of test results/interpretation of tests/patient visit/documentation/discussion with other provider(s)/discussion with patient and family.    Philip Wilhelm MD    Orthopaedic Spine Surgery  Dept Orthopaedic Surgery, [facility]  [phone] office, [phone] pager  [email or web address]    Addendum 9/27/2022:  Reviewed thoracic and lumbar MRI from 9/19/2022.  Findings consistent with previous imaging studies.  No change to plan procedure contemplated.

## 2022-08-30 NOTE — LETTER
"    8/30/2022         RE: Zayra Ramirez  [address] [place] [address]        Dear Colleague,    Thank you for referring your patient, Zayra Ramirez, to the [facility]. Please see a copy of my visit note below.    Spine Surgical Hx:  10/16/2017 - ACDF with plate fixation C3-C5 (2 levels); use of Cornerstone allograft (Dr. Rylan Monique, [facility]).  [Implants: Medtronic Zevo plate].  08/08/2019 - SCS implantation ([facility]); complicated by epidural abscess MSSA culture positive, treated with drainage/laminectomy and IV antibiotics.  Thus, the SCS was removed.  08/19/2019 - Laminectomies T11-L1 (T12-L2) ([facility]).     BMD Hx:  11/24/21 - DEXA spine/hip/wrist.  T-score = -1.1 (Left femoral neck).  Imp: Osteopenia.  12/8/21 - Saw Dr. Guerra (PM[facility]R).  P> RTC 6 wks; had not been seen since.      VIRTUAL VISIT:  Patient is evaluated today via billable virtual visit.    The patient has been notified of following:   \"This virtual visit will be conducted via a call between you and your physician/provider. We have found that certain health care needs can be provided without the need for an in-person physical exam.  This service lets us provide the care you need with a virtual conversation.  If a prescription is necessary we can send it directly to your pharmacy.  If lab work is needed we can place an order for that and you can then stop by our lab to have the test done at a later time.  If during the course of the call the physician/provider feels a virtual visit is not appropriate, you will not be charged for this service.\"     Physician has received verbal consent for a Virtual Visit from the patient.  Platform used:  Telephone  Time:  2:06pm to 2:29pm  Originating Location (pt. Location): Home  Distant Location (provider location):  [facility]     Chief Complaint   Patient presents with     follow up      Osteopenia  "       Last Visit Date: 6/28/22  Previous Impression:  51/f RHD with:  Lumbar spine:  1.  Multilevel lumbar spondylosis with stenosis, L1-S1, with neurogenic claudication.  2.  Spinal sagittal malalignment with thoracolumbar kyphosis (T10-L2= 24 deg kyphosis) and lumbar flatback deformity (LL = 42, ideal 48.5).  3.  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  4.  S/p laminectomies T11-L1 (8/19/19, Dr. Lian Causey).  5.  Smoking history x 35 yrs, quit in November 2021.  6.  Class II obesity (BMI 39.50).  Cervical spine:  1.  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  2.  S/p ACDF C3-C5 (10/16/2017 Kayden).  Previous Plan:  She continues to struggle with back and bilateral shoulder symptoms.  She is currently undergoing physical therapy for her shoulder; she stopped physical therapy for her back in February.  She is open to doing more physical therapy for her back.  She also has neck problems, but currently not as bad as shoulders or lower back.  At the same time, she is struggling with mental health issues.  She also does not seem to have lost significant weight compared to our last visit.  On a good note, however, she has been off smoking since November, and I congratulated her for this.  Overall, I do not think now is a good time to proceed with big multilevel spinal fusion likely from T10 down to pelvis.  At last visit, I thought about possibly stopping at L4 as SUMIT.  However, CT scan shows advanced spondylosis of the lower levels; thus, if she would need fusion surgery, it would have to go down to the sacrum/pelvis.  - Referral to Weight mgmt clinic.  - PT for low back (currently doing PT for shoulders at [facility]).  -We will try to get her reconnected to PM&R (Dr. Guerra), as she had not been seen since December 2021 clinic visit.  In basket message sent to Dr. Guerra.  - Encouraged to continue with Mental Health therapy.  In person return to clinic in 3  months for reevaluation, with repeat EOS full spine AP lateral standing x-rays and lumbar flexion-extension x-rays.      S>  52 year old female, RHD, here to review response to PT and weight mgmt, and discuss further options.    Considers her symptoms to be further worsening over time.  Unhappy with current symptoms, which she considers very disabling, and significantly limiting her quality of life.    Did PT for her back at [facility].  Per patient, only temporary relief, and overall did not really help.  Last Friday was her last visit.    Seen at [facility] clinic (Dr. Dorothy Hong).  Was given medication (Liraglutide or SAXENDA - daily injections).  Down 8 lbs since she started with them.  Currently at 208 lbs, which is somewhat less than when I last saw her.    Had been seeing her psych counselor.  Helping, per patient.    Has appointment with Dr. Guerra scheduled for tomorrow.    Previous injections:  1/10/19 - RFA L3-L5 (Dr. Dan Sipple, [facility]).  3/18/19 - R L5-S1 TFESI  12/17/19 - RFA bilateral L3-4, L4-5 and L5-S1 (Dr. Florian).  6/16/20 - Bilateral SIJ injection with steroid ([facility])  9/15/20 - L5-S1 IL EDUARDO (Chiqui)  11/17/20 - RFA Right L3-4, L4-5, L5-S1 (Chiqui).   6/29/21 - L5-S1 IL EDUARDO (Chiqui)  10/14/21 - C7-T1 IL EDUARDO (Chiqui)    Oswestry (KATIE) Questionnaire    OSWESTRY DISABILITY INDEX 8/28/2022   Count 10   Sum 35   Oswestry Score (%) 70   Some recent data might be hidden      KATIE 11/8/21       64%  KATIE 6/27/22       64%      Neck Disability Index (NDI) Questionnaire    Neck Disability Index (NDI) 6/27/2022   Neck Disability Index: Count 9   NDI: Total Score = SUM (points for all 10 findings) 23   Neck Disability in Percent = (Total Score) / 50 * 100 51.11 (%)   Some recent data might be hidden      NDI 6/27/22       51.11%    PROMIS-10 Scores  Global Mental Health Score: (P) 11  Global Physical Health Score: (P) 11  PROMIS TOTAL - SUBSCORES: (P) 22    Physical Examination:    This was a  virtual visit, so very limited exam could be performed.  Patient seemed alert, oriented x 3, cooperative, with coherent speech, and not in distress.  Able to respond to questions appropriately and follow instructions.    Imaging:    EOS full spine ap-lat x-rays taken today show worsening of thoracic/TL junction kyphosis compared to full body x-rays taken 9 months ago November 2021.  Lumbar lordosis decreased from 42 degrees to 31 degrees.  L4-S1 decreased from 40 degrees to 35 degrees.  Pelvic tilt increased from 12 degrees to 18 degrees.  SVA increased from 2.1 to 4.8 cm.  T1 pelvic angle increased from 10 to 17 degrees; global tilt increased from 13 degrees to 23 degrees.  T10-L2 kyphosis increased from 24degrees to 31 degrees.  Sagittal measurements:  LL 31, ideal 49  L4-S1 35, ideal 33  PI 42  PT 18  SVA +4.8cm  TPA 17  GT 23  T10-L2 kyph 31    Lumbar flexion-extension x-rays taken today show advanced multilevel lumbar spondylosis; thoracolumbar junction kyphosis, mainly secondary to disc collapse with autofusion in kyphotic alignment at T12-L1.  No radiographic instability on dynamic films.    Assessment:    51/f RHD with:  Lumbar spine:  1.  Multilevel lumbar spondylosis with stenosis, L1-S1, with neurogenic claudication.  2.  Progressive spinal sagittal malalignment (progression between 11/2021 and 08/2022 x-rays) with thoracolumbar kyphosis (T10-L2= 31 deg kyphosis) and lumbar flatback deformity (LL = 31, ideal 49).  3.  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  4.  S/p laminectomies T11-L1 (8/19/19, Dr. Lian Causey).  5.  Smoking history x 35 yrs, quit in November 2021.  6.  Class II obesity (current BMI 35.7 on 8/16/22, decreased from 39.8 on 6/2/22).  7.  Osteopenia (DEXA 11/24/21; T-score = -1.1).  Cervical spine:  1.  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  2.  S/p ACDF C3-C5 (10/16/2017 Kayden).    Plan:    Had good long discussion with the patient.   We discussed her spinal condition and treatment options.  X-rays taken yesterday show progression of sagittal spinal deformity compared to x-rays from 9 months ago.  Main problem is that of thoracolumbar junction kyphosis, secondary to T12-L1 autofusion with kyphotic collapse.  At same time, has advanced multilevel lumbar spondylosis and stenosis.  Correction of this problem will require multilevel spinal fusion.  Since the main deformity, and thus the fulcrum of correction is at the thoracolumbar junction, we may not be able to simply stop at T10, and will likely need to go up higher, probably T8.    At present, patient considers her symptoms quite disabling.  She had already tried extensive nonoperative treatment, including physical therapy, multiple injections, anti-inflammatory medication, home exercise program, activity modification, etc.  She had also been working on reducing weight with our weight management clinic, currently on medication.  She also has been seeing a psychological, psychiatric counselor.  She considers her mental health issues stable.  - Case request:  PISF T8 to pelvis, with multilevel TLIF-SPO L1-S1 (5 levels); poss cement augmentation of screws; use of Infuse BMP Large kit and allograft.  - PAC referral.  - Updated lumbar MRI (last one 08/2021); thoracic MRI.  Will need sedation (will prescribe valium 10 mg PO).    40 minutes spent on the date of the encounter doing chart review/review of outside records/review of test results/interpretation of tests/patient visit/documentation/discussion with other provider(s)/discussion with patient and family.    Philip Wilhelm MD    Orthopaedic Spine Surgery  Dept Orthopaedic Surgery, formerly [facility]  [phone] office, [phone] pager  [email or web address]

## 2022-08-31 ENCOUNTER — VIRTUAL VISIT (OUTPATIENT)
Dept: PHYSICAL MEDICINE AND REHAB | Facility: CLINIC | Age: 52
End: 2022-08-31
Payer: COMMERCIAL

## 2022-08-31 ENCOUNTER — TELEPHONE (OUTPATIENT)
Dept: ORTHOPEDICS | Facility: CLINIC | Age: 52
End: 2022-08-31

## 2022-08-31 DIAGNOSIS — M81.8 OTHER OSTEOPOROSIS WITHOUT CURRENT PATHOLOGICAL FRACTURE: ICD-10-CM

## 2022-08-31 DIAGNOSIS — M81.0 AGE-RELATED OSTEOPOROSIS WITHOUT CURRENT PATHOLOGICAL FRACTURE: Primary | ICD-10-CM

## 2022-08-31 PROCEDURE — 99213 OFFICE O/P EST LOW 20 MIN: CPT | Mod: 95 | Performed by: PHYSICAL MEDICINE & REHABILITATION

## 2022-08-31 RX ORDER — DIPHENHYDRAMINE HYDROCHLORIDE 50 MG/ML
50 INJECTION INTRAMUSCULAR; INTRAVENOUS
Status: CANCELLED
Start: 2022-08-31

## 2022-08-31 RX ORDER — EPINEPHRINE 1 MG/ML
0.3 INJECTION, SOLUTION, CONCENTRATE INTRAVENOUS EVERY 5 MIN PRN
Status: CANCELLED | OUTPATIENT
Start: 2022-08-31

## 2022-08-31 RX ORDER — MEPERIDINE HYDROCHLORIDE 25 MG/ML
25 INJECTION INTRAMUSCULAR; INTRAVENOUS; SUBCUTANEOUS EVERY 30 MIN PRN
Status: CANCELLED | OUTPATIENT
Start: 2022-08-31

## 2022-08-31 RX ORDER — METHYLPREDNISOLONE SODIUM SUCCINATE 125 MG/2ML
125 INJECTION, POWDER, LYOPHILIZED, FOR SOLUTION INTRAMUSCULAR; INTRAVENOUS
Status: CANCELLED
Start: 2022-08-31

## 2022-08-31 RX ORDER — ALBUTEROL SULFATE 0.83 MG/ML
2.5 SOLUTION RESPIRATORY (INHALATION)
Status: CANCELLED | OUTPATIENT
Start: 2022-08-31

## 2022-08-31 RX ORDER — ALBUTEROL SULFATE 90 UG/1
1-2 AEROSOL, METERED RESPIRATORY (INHALATION)
Status: CANCELLED
Start: 2022-08-31

## 2022-08-31 ASSESSMENT — PATIENT HEALTH QUESTIONNAIRE - PHQ9
10. IF YOU CHECKED OFF ANY PROBLEMS, HOW DIFFICULT HAVE THESE PROBLEMS MADE IT FOR YOU TO DO YOUR WORK, TAKE CARE OF THINGS AT HOME, OR GET ALONG WITH OTHER PEOPLE: NOT DIFFICULT AT ALL
SUM OF ALL RESPONSES TO PHQ QUESTIONS 1-9: 1
SUM OF ALL RESPONSES TO PHQ QUESTIONS 1-9: 1

## 2022-08-31 ASSESSMENT — ANXIETY QUESTIONNAIRES
7. FEELING AFRAID AS IF SOMETHING AWFUL MIGHT HAPPEN: NOT AT ALL
GAD7 TOTAL SCORE: 2
8. IF YOU CHECKED OFF ANY PROBLEMS, HOW DIFFICULT HAVE THESE MADE IT FOR YOU TO DO YOUR WORK, TAKE CARE OF THINGS AT HOME, OR GET ALONG WITH OTHER PEOPLE?: NOT DIFFICULT AT ALL
1. FEELING NERVOUS, ANXIOUS, OR ON EDGE: SEVERAL DAYS
GAD7 TOTAL SCORE: 2
5. BEING SO RESTLESS THAT IT IS HARD TO SIT STILL: NOT AT ALL
IF YOU CHECKED OFF ANY PROBLEMS ON THIS QUESTIONNAIRE, HOW DIFFICULT HAVE THESE PROBLEMS MADE IT FOR YOU TO DO YOUR WORK, TAKE CARE OF THINGS AT HOME, OR GET ALONG WITH OTHER PEOPLE: NOT DIFFICULT AT ALL
4. TROUBLE RELAXING: NOT AT ALL
3. WORRYING TOO MUCH ABOUT DIFFERENT THINGS: SEVERAL DAYS
2. NOT BEING ABLE TO STOP OR CONTROL WORRYING: NOT AT ALL
6. BECOMING EASILY ANNOYED OR IRRITABLE: NOT AT ALL
7. FEELING AFRAID AS IF SOMETHING AWFUL MIGHT HAPPEN: NOT AT ALL

## 2022-08-31 NOTE — TELEPHONE ENCOUNTER
SPINE PATIENTS - NEW PROTOCOL PREVISIT    RECORDS RECEIVED FROM: internal   REASON FOR VISIT: DOS: 11/30/22 (tentative) combo case with Dr. Wilhelm & Dr. Sands  Posterior instrumented spinal fusion Thoracic 8 to sacrum, with bilateral pelvic fixation; Transforaminal lumbar interbody fusion with Erickson-Nixon osteotomy lumbar 1 to sacral 1 (5 levels); possible cement augmentation of screws; Use of Infuse bone morphogenetic protein Large kit and allograft.   Date of Appt: 9/30/22   NOTES (FOR ALL VISITS) STATUS DETAILS   OFFICE NOTE from referring provider Internal Dr Philip Wilhelm @ [facility] Orth[facility]:  8/30/22 6/28/22   MEDICATION LIST Internal    IMAGING  (FOR ALL VISITS)     MRI (HEAD, NECK, SPINE) Received/internal [facility]:  MRI Lumbar Spine 8/13/21    [facility]:  MRI Lumbar Spine 9/24/19   XRAY (SPINE) *NEUROSURGERY* Internal [facility]:  XR Lumbar Spine 8/29/22  XR Spine Complete 8/29/22   CT (HEAD, NECK, SPINE) Internal [facility]:  CT Thoracic Spine 11/24/21  CT Lumbar Spine 11/24/21

## 2022-08-31 NOTE — TELEPHONE ENCOUNTER
Phoned patient to schedule a tentative date for surgery with Dr. Wilhelm.     Patient aware that this will be a combo case and thus perhaps a later date due to two surgeons availability.     Date chosen 11/30/22.    In this encounter patient has been scheduled for her PAC and COVID PCR.     Patient had no further questions or concerns.

## 2022-08-31 NOTE — LETTER
2022       RE: Zayra Ramirez  [address] [place] [address]     Dear Colleague,    Thank you for referring your patient, Zayra Ramirez, to the [facility]YSICAL MEDICINE AND REHABILITATION CLINIC [place] at [facility]. Please see a copy of my visit note below.      Answers for HPI/ROS submitted by the patient on 2022  If you checked off any problems, how difficult have these problems made it for you to do your work, take care of things at home, or get along with other people?: Not difficult at all  PHQ9 TOTAL SCORE: 1  LISA 7 TOTAL SCORE: 2             PM&R Clinic Note     Patient Name: Zayra Ramirez : 1970 Medical Record: [id number]     Requesting Physician/clinician: No att. providers found           History of Present Illness:     Zayra Ramirez is a 52 year old female who returns to discuss bone health prior to spine surgery.  I  Last saw her in this clinic 2021 and recommended PT.  We discussed OP treatment options today, and she is not a good candidate for oral bisphosphonates given plans for invasive dental procedures in the coming months placing her at risk for ONJ.  She has no history of CVA or MI in the prior year and is interested in Evenity.  No recent labs available for review.  She was hospitalized in July for pseudogout flaor.      Contraindications to antiresporptives or osteoanabolics:  Planned invasive dental work in the coming 2 months, therefore not a good candidate for ZA or other anti-resorptives due to risk of ONJ.  No history of CVA or MI in the past year.             Past Medical and Surgical History:     Past Medical History:   Diagnosis Date     Allergic rhinitis      Anemia      Arthritis      Asthma     copd     Dental abscess 2015     Depressive disorder      Gastroesophageal reflux disease      History of emphysema      Hoarseness      Hypertension      Obstructive sleep apnea       Other chronic pain      Respiratory bronchiolitis interstitial lung disease (H)      Sleep apnea      Smoker 11/02/2015     Past Surgical History:   Procedure Laterality Date     CERVICAL FUSION       COLONOSCOPY       COLONOSCOPY N/A 02/06/2020    Procedure: COLONOSCOPY, WITH POLYPECTOMY AND BIOPSY;  Surgeon: Julian Mccullough MD;  Location:  GI     ENT SURGERY       ESOPHAGOSCOPY, GASTROSCOPY, DUODENOSCOPY (EGD), COMBINED N/A 02/06/2020    Procedure: ESOPHAGOGASTRODUODENOSCOPY (EGD);  Surgeon: Julian Mccullough MD;  Location:  GI     EXCISE LESION INTRAORAL Bilateral 10/03/2018    Procedure: EXCISE LESION INTRAORAL;  Wide Local Excision Of of Left Oral Cavity Ulcer;  Surgeon: Morro Mijares MD;  Location: [facility] OR     HC DRAIN SKIN ABSCESS SIMPLE/SINGLE  03/16/2012    Procedure:INCISION AND DRAINAGE, ABSCESS, SIMPLE; Surgeon:CHRISTIANO HANCOCK; Location: GI     HEAD & NECK SURGERY       HYSTERECTOMY       HYSTERECTOMY       INCISION AND DRAINAGE ABDOMEN WASHOUT, COMBINED       INJECT EPIDURAL CERVICAL N/A 10/14/2021    Procedure: Cervical 7- Thoracic 1 epidural steroid injection with fluoroscopy;  Surgeon: Tarm Florian MD;  Location: [facility] OR     INJECT EPIDURAL LUMBAR Right 09/15/2020    Procedure: Lumbar5- sacral 1 epidural steroid injection with fluoroscopy;  Surgeon: Tram Florian MD;  [facility]     INJECT EPIDURAL LUMBAR Right 06/29/2021    Procedure: Lumbar 5 sacral 1 epidural steroid injection with fluoroscopy;  Surgeon: Tram Florian MD;  Location: [facility] OR     INJECT SACROILIAC JOINT Bilateral 06/16/2020    Procedure: Bilateral sacroiliac joint steroid injection with fluoroscopy;  Surgeon: Tram Florian MD;  Location: [facility] OR     LAMINECTOMY THORACIC ONE LEVEL N/A 08/19/2019    Procedure: LAMINECTOMY, SPINE, THORACIC, 11-12 and Part of Lumbar 1, DRAINAGE OF EPIDURAL ABCESS, Epidural Drain Placement X 2;  Surgeon: Yadiel Beal MD;  Location: [facility] OR     OH PERCUT  IMPLNT NEUROELECT,EPIDURAL N/A 2019    Procedure: TRIAL, SPINAL CORD STIMULATOR WITH BOSTON SCIENTIFIC;  Surgeon: Sipple, Daniel Peter, DO;  Location: [facility];  Service: Pain     RADIO FREQUENCY ABLATION / DESTRUCTION OF SACROILOAC JOINT DORSAL PRIMARY RAMUS Bilateral 2019    Procedure: Bilateral lumbar radiofrequency ablation with fluoroscopy and intravenous sedation ( Lumbar 2,3,4,5 medial branch nerves for the bilateral lumbar3-4, 4-5 and 5-sacral1 joints.;  Surgeon: Tram Florian MD;  Location:  OR     RADIO FREQUENCY ABLATION / DESTRUCTION OF SACROILOAC JOINT DORSAL PRIMARY RAMUS Right 2020    Procedure: Right lumbar medial branch nerve radiofrequency ablation right L2,3,4,5 nerves supplying the right L3-4, L4-5 and L5-S1 facet joints;  Surgeon: Tram Florian MD;  Location: [facility] OR     spinal cord stimulator  2019     spinal cord stimulator removal  2019            Social History:     Social History     Tobacco Use     Smoking status: Former Smoker     Packs/day: 1.00     Years: 30.00     Pack years: 30.00     Types: Cigarettes     Start date: 1996     Quit date: 2021     Years since quittin.7     Smokeless tobacco: Never Used   Substance Use Topics     Alcohol use: No              Family History:     Family History   Problem Relation Age of Onset     Asthma Mother      Restless Leg Syndrome Mother      Other Cancer Father         base of tongue cancer at age ~65     Hypertension Father      Back Pain Father      Restless Leg Syndrome Father      Coronary Artery Disease Father      Restless Leg Syndrome Sister      Breast Cancer Maternal Aunt 55     Anesthesia Reaction No family hx of      Deep Vein Thrombosis (DVT) No family hx of      Thyroid Cancer No family hx of      Multiple endocrine neoplasia No family hx of             Medications:     Current Outpatient Medications   Medication Sig Dispense Refill     albuterol (PROAIR HFA/PROVENTIL  HFA/VENTOLIN HFA) 108 (90 Base) MCG/ACT inhaler Inhale 2 puffs into the lungs every 6 hours as needed for shortness of breath / dyspnea or wheezing Ventolin please 18 g 2     ARIPiprazole (ABILIFY) 5 MG tablet Take 5 mg by mouth daily       Black Pepper-Turmeric (TURMERIC CURCUMIN) 5-1000 MG CAPS Take 1 capsule by mouth daily       busPIRone HCl (BUSPAR) 30 MG tablet Take 30 mg by mouth 2 times daily        carboxymethylcellulose (CARBOXYMETHYLCELLULOSE SODIUM) 0.5 % SOLN ophthalmic solution Place 1 drop into both eyes 3 times daily as needed for dry eyes 15 mL 11     celecoxib (CELEBREX) 200 MG capsule Take 1 capsule (200 mg) by mouth every morning 30 capsule 3     cetirizine (ZYRTEC) 10 MG tablet Take 1 tablet (10 mg) by mouth daily 90 tablet 2     cyclobenzaprine (FLEXERIL) 10 MG tablet Take 1 tablet (10 mg) by mouth At Bedtime 90 tablet 1     dexamethasone alcohol-free (DECADRON) 0.1 MG/ML solution Swish and spit 10 mLs (1 mg) in mouth 2 times daily 200 mL 0     diazepam (VALIUM) 5 MG tablet Take 2 tablets (10 mg) by mouth once as needed for anxiety (to be taken ~ 20 mins prior to MRI) 2 tablet 0     DULoxetine (CYMBALTA) 60 MG capsule Take 120 mg by mouth At Bedtime        EPINEPHrine (EPIPEN/ADRENACLICK/OR ANY BX GENERIC EQUIV) 0.3 MG/0.3ML injection 2-pack        folic acid (FOLVITE) 1 MG tablet Take 1 tablet (1 mg) by mouth daily 90 tablet 3     insulin pen needle (32G X 4 MM) 32G X 4 MM miscellaneous Use 1 NEEDLE daily to use with Saxenda 100 each 3     insulin pen needle (32G X 6 MM) 32G X 6 MM miscellaneous Use daily pen needles daily or as directed. 100 each 2     liraglutide - Weight Management (SAXENDA) 18 MG/3ML pen Inject 0.6 mg Subcutaneous daily for 7 days, THEN 1.2 mg daily for 7 days, THEN 1.8 mg daily for 7 days, THEN 2.4 mg daily for 7 days, THEN 3 mg daily for 30 days. 1 pen 0     lisinopril-hydrochlorothiazide (ZESTORETIC) 20-25 MG tablet Take 1 tablet by mouth daily 90 tablet 3     magnesium  "oxide (MAG-OX) 400 MG tablet Take 400 mg by mouth daily       methotrexate sodium 2.5 MG TABS Take 10 tablets (25 mg) by mouth once a week MONDAYS 120 tablet 2     montelukast (SINGULAIR) 10 MG tablet Take 10 mg by mouth At Bedtime       omeprazole (PRILOSEC) 40 MG DR capsule Take 1 capsule (40 mg) by mouth daily (Patient taking differently: Take 40 mg by mouth every evening) 180 capsule 3     OXYGEN-HELIUM IN 2-3L PRN during the day, 3L @ night    Oxygen only not helium       potassium 99 MG TABS Take 1 tablet by mouth daily       pregabalin (LYRICA) 150 MG capsule Take 1 capsule (150 mg) by mouth 2 times daily 60 capsule 3     Respiratory Therapy Supplies ([facility] CPAP FILTER) MISC        rOPINIRole (REQUIP) 0.5 MG tablet Take 1 tablet (0.5 mg) by mouth At Bedtime Take 1 tab by mouth at bedtime. 90 tablet 1     vitamin D3 (CHOLECALCIFEROL) 250 mcg (53549 units) capsule Take 1 capsule by mouth daily       zinc sulfate (ZINCATE) 220 (50 Zn) MG capsule Take 220 mg by mouth daily (with lunch)               Allergies:     Allergies   Allergen Reactions     Bee Venom Anaphylaxis     Doxycycline Anaphylaxis     Patient thinks it may have been just nausea and vomiting, however unable to confirm     Erythromycin Anaphylaxis and Shortness Of Breath     Other reaction(s): Vomiting     Hydrocodone-Acetaminophen Itching              ROS:     A focused ROS is negative other than the symptoms noted above in the HPI.             Physical Examiniation:     VITAL SIGNS: LMP  (LMP Unknown)   BMI: Estimated body mass index is 35.7 kg/m  as calculated from the following:    Height as of 8/16/22: 1.626 m (5' 4\").    Weight as of 8/16/22: 94.3 kg (208 lb).    Gen: NAD, pleasant and cooperative            Assessment/Plan:     51 YO female with OP based on oBMD with scheduled spine surgery  11/2022.  She has contra-indications to anti-resorptives.  I will prescribe Evenity and check 25OH vitamin D levels as there is not a " recent level for review.  RTC in 2 weeks to confirm that she has started treatment and is tolerating it well.  She is in agreement with this plan.       Yeimy Guerra, DO    Physical Medicine & Rehabilitation    I spent a total of 9 minutes  with Zayra Ramirez during today's virtual video office visit.     12 minutes spent on the date of the encounter doing chart review, history and exam, documentation and further activities as noted above

## 2022-09-01 NOTE — TELEPHONE ENCOUNTER
FUTURE VISIT INFORMATION      SURGERY INFORMATION:    Date: 22    [facility]    Surgeon:  Philip Wilhelm MD    Anesthesia Type:  general    Procedure: Posterior instrumented spinal fusion Thoracic 8 to sacrum, with bilateral pelvic fixation; Transforaminal lumbar interbody fusion with Erickson-Nixon osteotomy lumbar 1 to sacral 1 (5 levels); possible cement augmentation of screws; Use of Infuse bone morphogenetic protein Large kit and allograft.    Consult: virtual visit     RECORDS REQUESTED FROM:        Primary Care Provider: Neena Tam APRN CNP- [facility]    Pertinent Medical History: hypertension     Most recent EKG+ Tracin21    Most recent PFT's: 20- Jennifer

## 2022-09-02 ENCOUNTER — PRE VISIT (OUTPATIENT)
Dept: UROLOGY | Facility: CLINIC | Age: 52
End: 2022-09-02

## 2022-09-02 ENCOUNTER — OFFICE VISIT (OUTPATIENT)
Dept: UROLOGY | Facility: CLINIC | Age: 52
End: 2022-09-02
Payer: COMMERCIAL

## 2022-09-02 VITALS
HEIGHT: 64 IN | HEART RATE: 71 BPM | WEIGHT: 208 LBS | SYSTOLIC BLOOD PRESSURE: 100 MMHG | BODY MASS INDEX: 35.51 KG/M2 | DIASTOLIC BLOOD PRESSURE: 67 MMHG

## 2022-09-02 DIAGNOSIS — R35.0 URINARY FREQUENCY: Primary | ICD-10-CM

## 2022-09-02 PROCEDURE — 99203 OFFICE O/P NEW LOW 30 MIN: CPT | Mod: GC | Performed by: UROLOGY

## 2022-09-02 ASSESSMENT — PAIN SCALES - GENERAL: PAINLEVEL: NO PAIN (0)

## 2022-09-02 ASSESSMENT — ASTHMA QUESTIONNAIRES
ACT_TOTALSCORE: 18
QUESTION_5 LAST FOUR WEEKS HOW WOULD YOU RATE YOUR ASTHMA CONTROL: WELL CONTROLLED
ACT_TOTALSCORE: 18
QUESTION_2 LAST FOUR WEEKS HOW OFTEN HAVE YOU HAD SHORTNESS OF BREATH: THREE TO SIX TIMES A WEEK
QUESTION_3 LAST FOUR WEEKS HOW OFTEN DID YOUR ASTHMA SYMPTOMS (WHEEZING, COUGHING, SHORTNESS OF BREATH, CHEST TIGHTNESS OR PAIN) WAKE YOU UP AT NIGHT OR EARLIER THAN USUAL IN THE MORNING: ONCE OR TWICE
QUESTION_1 LAST FOUR WEEKS HOW MUCH OF THE TIME DID YOUR ASTHMA KEEP YOU FROM GETTING AS MUCH DONE AT WORK, SCHOOL OR AT HOME: NONE OF THE TIME
QUESTION_4 LAST FOUR WEEKS HOW OFTEN HAVE YOU USED YOUR RESCUE INHALER OR NEBULIZER MEDICATION (SUCH AS ALBUTEROL): ONE OR TWO TIMES PER DAY

## 2022-09-02 NOTE — NURSING NOTE
"Chief Complaint   Patient presents with     Consult     Poor bladder emptying        Blood pressure 100/67, pulse 71, height 1.626 m (5' 4\"), weight 94.3 kg (208 lb), not currently breastfeeding. Body mass index is 35.7 kg/m .    Patient Active Problem List   Diagnosis     Lumbago     Facial cellulitis     Class 2 severe obesity with serious comorbidity and body mass index (BMI) of 37.0 to 37.9 in adult, unspecified obesity type (H)     Dental abscess     Hypoxia     Subcutaneous emphysema (H)     Borderline personality disorder (H)     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)     Esophageal stricture     GERD (gastroesophageal reflux disease)     HTN (hypertension)     Interstitial lung disease (H)     Moderate persistent asthma without complication     Onychomycosis     Restless leg syndrome     Seasonal allergies     Stress     Respiratory bronchiolitis interstitial lung disease (H)     Bilateral shoulder pain     Spinal epidural abscess     Lumbar spondylosis     Inflammatory arthritis     Arthralgia of temporomandibular joint     Articular disc disorder of temporomandibular joint     Derangement of temporomandibular joint     Calculus of gallbladder without cholecystitis without obstruction     Displacement of articular disc of temporomandibular joint with reduction     Myofascial pain     Oral lesion     Symptomatic cholelithiasis     Sacro-iliac pain     Degenerative lumbar spinal stenosis     Chronic lumbar radiculopathy     Glucose intolerance     Chronic neck pain     Lumbar radiculopathy, chronic     Cervical radiculopathy     Acute pain of right shoulder     Other osteoporosis without current pathological fracture       Allergies   Allergen Reactions     Bee Venom Anaphylaxis     Doxycycline Anaphylaxis     Patient thinks it may have been just nausea and vomiting, however unable to confirm     Erythromycin Anaphylaxis and Shortness Of Breath     Other reaction(s): Vomiting     " Hydrocodone-Acetaminophen Itching       Current Outpatient Medications   Medication Sig Dispense Refill     albuterol (PROAIR HFA/PROVENTIL HFA/VENTOLIN HFA) 108 (90 Base) MCG/ACT inhaler Inhale 2 puffs into the lungs every 6 hours as needed for shortness of breath / dyspnea or wheezing Ventolin please 18 g 2     ARIPiprazole (ABILIFY) 5 MG tablet Take 5 mg by mouth daily       Black Pepper-Turmeric (TURMERIC CURCUMIN) 5-1000 MG CAPS Take 1 capsule by mouth daily       busPIRone HCl (BUSPAR) 30 MG tablet Take 30 mg by mouth 2 times daily        carboxymethylcellulose (CARBOXYMETHYLCELLULOSE SODIUM) 0.5 % SOLN ophthalmic solution Place 1 drop into both eyes 3 times daily as needed for dry eyes 15 mL 11     celecoxib (CELEBREX) 200 MG capsule Take 1 capsule (200 mg) by mouth every morning 30 capsule 3     cetirizine (ZYRTEC) 10 MG tablet Take 1 tablet (10 mg) by mouth daily 90 tablet 2     cyclobenzaprine (FLEXERIL) 10 MG tablet Take 1 tablet (10 mg) by mouth At Bedtime 90 tablet 1     dexamethasone alcohol-free (DECADRON) 0.1 MG/ML solution Swish and spit 10 mLs (1 mg) in mouth 2 times daily 200 mL 0     diazepam (VALIUM) 5 MG tablet Take 2 tablets (10 mg) by mouth once as needed for anxiety (to be taken ~ 20 mins prior to MRI) 2 tablet 0     DULoxetine (CYMBALTA) 60 MG capsule Take 120 mg by mouth At Bedtime        EPINEPHrine (EPIPEN/ADRENACLICK/OR ANY BX GENERIC EQUIV) 0.3 MG/0.3ML injection 2-pack        folic acid (FOLVITE) 1 MG tablet Take 1 tablet (1 mg) by mouth daily 90 tablet 3     insulin pen needle (32G X 4 MM) 32G X 4 MM miscellaneous Use 1 NEEDLE daily to use with Saxenda 100 each 3     insulin pen needle (32G X 6 MM) 32G X 6 MM miscellaneous Use daily pen needles daily or as directed. 100 each 2     liraglutide - Weight Management (SAXENDA) 18 MG/3ML pen Inject 0.6 mg Subcutaneous daily for 7 days, THEN 1.2 mg daily for 7 days, THEN 1.8 mg daily for 7 days, THEN 2.4 mg daily for 7 days, THEN 3 mg daily  for 30 days. 1 pen 0     lisinopril-hydrochlorothiazide (ZESTORETIC) 20-25 MG tablet Take 1 tablet by mouth daily 90 tablet 3     magnesium oxide (MAG-OX) 400 MG tablet Take 400 mg by mouth daily       methotrexate sodium 2.5 MG TABS Take 10 tablets (25 mg) by mouth once a week  120 tablet 2     montelukast (SINGULAIR) 10 MG tablet Take 10 mg by mouth At Bedtime       omeprazole (PRILOSEC) 40 MG DR capsule Take 1 capsule (40 mg) by mouth daily (Patient taking differently: Take 40 mg by mouth every evening) 180 capsule 3     OXYGEN-HELIUM IN 2-3L PRN during the day, 3L @ night    Oxygen only not helium       potassium 99 MG TABS Take 1 tablet by mouth daily       pregabalin (LYRICA) 150 MG capsule Take 1 capsule (150 mg) by mouth 2 times daily 60 capsule 3     Respiratory Therapy Supplies (Former[facility] CPAP FILTER) MISC        rOPINIRole (REQUIP) 0.5 MG tablet Take 1 tablet (0.5 mg) by mouth At Bedtime Take 1 tab by mouth at bedtime. 90 tablet 1     vitamin D3 (CHOLECALCIFEROL) 250 mcg (25299 units) capsule Take 1 capsule by mouth daily       zinc sulfate (ZINCATE) 220 (50 Zn) MG capsule Take 220 mg by mouth daily (with lunch)          Social History     Tobacco Use     Smoking status: Former Smoker     Packs/day: 1.00     Years: 30.00     Pack years: 30.00     Types: Cigarettes     Start date: 1996     Quit date: 2021     Years since quittin.8     Smokeless tobacco: Never Used   Vaping Use     Vaping Use: Former   Substance Use Topics     Alcohol use: No     Drug use: Yes     Types: Marijuana     Comment: very rarely        Mary Mayers  2022  8:47 AM

## 2022-09-02 NOTE — PATIENT INSTRUCTIONS
24y  at 39w0d, not in active labor  D/w Dr Linares  Discharged home with instructions  labor precautions  Pt to monitor fetal kick counts  Pt to follow up with OB as scheduled Please follow up with video urodynamics next available and then a cystoscopy with Dr. Saucedo or Dr. Gerber (preferably on the same day after the urodynamics).    It was a pleasure meeting with you today.  Thank you for allowing me and my team the privilege of caring for you today.  YOU are the reason we are here, and I truly hope we provided you with the excellent service you deserve.  Please let us know if there is anything else we can do for you so that we can be sure you are leaving completely satisfied with your care experience.

## 2022-09-02 NOTE — LETTER
"9/2/2022       RE: Zayra Ramirez  [address] [place] [address]     Dear Colleague,    Thank you for referring your patient, Zayra Ramirez, to the [facility] [place] at [facility]. Please see a copy of my visit note below.    HPI:  Zayra Ramirez is a 52 year old female being seen for voiding dysfunction. Referred by Neena Tam CNP.    She states she had hysterectomy for prolapse, possibly menorrhagia in 1997, rectocele and cystocele repair at the same time as well. No records to review in chart.     She states her voiding had been fine up until that time until about a year ago. Will feel the urge to go often but has trouble actually voiding, requiring crede maneuver to fully empty. No leaking. Does have extensive history of back pain and previous interventions. Full motor function of lower extremities, some radiculopathy. No urologic meds. She has ovaries and does not believe she has gone through menopause yet (which she states is consistent with her previous blood work).    No hematuria. No dysuria, No fevers, chills, recurrent infections. No incontinence.    No UDS to review  A1c ~5.    PMH - obesity, gerd, htn, asthma.  PSH - relevant surgeries as detailed above    Reviewed previous notes form Neena Tam on 7/28/2022.    Exam:  /67   Pulse 71   Ht 1.626 m (5' 4\")   Wt 94.3 kg (208 lb)   LMP  (LMP Unknown)   BMI 35.70 kg/m    General: age-appropriate appearing female in NAD sitting in an exam chair  HEENT: Head AT/NC, EOMI, CN Grossly intact.  Resp: no respiratory distress  Abdomen:  Abdomen is soft and nontender. No organomegaly.   LE: Edema is none   Neuro: grossly non focal. Normal reflexes  Skin: clear of rashes or ecchymoses. No sacral decubitus ulcer.    Review of Imaging:  The following imaging exams were independently viewed and interpreted by me and discussed with patient:  Renal US ordered, " not completed    Review of Labs:  The following labs were reviewed by me and discussed with the patient:    Outside BMP -  Cr 0.8    Assessment & Plan   Zayra Ramirez is a 52 year old female with frequency, difficulty voiding and history of previous cystocele repair. No incontinence, no residual prolapse. Discussed that first step would likely be UDS to further characterize her voiding dysfunction. Will hold off on starting any meds in the setting of mixed clinical picture.  Recommended follow up with likely cystoscopy with our [facility] team  1. UDS  2.  cystoscopy/pelvic exam with our [facility] team to review UDS/eval anatomy to determine best ongoing management  Philip Cardenas MD  Urology Resident    Physician Attestation   I, Robbi Tran MD, saw this patient and agree with the findings and plan of care as documented in the note.      Items personally reviewed/procedural attestation: vitals, labs and imaging and agree with the interpretation documented in the note.    Robbi Tran MD  ==========================

## 2022-09-02 NOTE — PROGRESS NOTES
"HPI:  Zayra Ramirez is a 52 year old female being seen for voiding dysfunction. Referred by Neena Tam CNP.    She states she had hysterectomy for prolapse, possibly menorrhagia in 1997, rectocele and cystocele repair at the same time as well. No records to review in chart.     She states her voiding had been fine up until that time until about a year ago. Will feel the urge to go often but has trouble actually voiding, requiring crede maneuver to fully empty. No leaking. Does have extensive history of back pain and previous interventions. Full motor function of lower extremities, some radiculopathy. No urologic meds. She has ovaries and does not believe she has gone through menopause yet (which she states is consistent with her previous blood work).    No hematuria. No dysuria, No fevers, chills, recurrent infections. No incontinence.    No UDS to review  A1c ~5.    PMH - obesity, gerd, htn, asthma.  PSH - relevant surgeries as detailed above    Reviewed previous notes form Neena Tam on 7/28/2022.    Exam:  /67   Pulse 71   Ht 1.626 m (5' 4\")   Wt 94.3 kg (208 lb)   LMP  (LMP Unknown)   BMI 35.70 kg/m    General: age-appropriate appearing female in NAD sitting in an exam chair  HEENT: Head AT/NC, EOMI, CN Grossly intact.  Resp: no respiratory distress  Abdomen:  Abdomen is soft and nontender. No organomegaly.   LE: Edema is none   Neuro: grossly non focal. Normal reflexes  Skin: clear of rashes or ecchymoses. No sacral decubitus ulcer.    Review of Imaging:  The following imaging exams were independently viewed and interpreted by me and discussed with patient:  Renal US ordered, not completed    Review of Labs:  The following labs were reviewed by me and discussed with the patient:    Outside BMP -  Cr 0.8    Assessment & Plan   Zayra Ramirez is a 52 year old female with frequency, difficulty voiding and history of previous cystocele repair. No incontinence, no residual prolapse. Discussed " that first step would likely be UDS to further characterize her voiding dysfunction. Will hold off on starting any meds in the setting of mixed clinical picture.  Recommended follow up with likely cystoscopy with our [facility] team  1. UDS  2.  [facility] to review UDS/eval anatomy to determine best ongoing management  Philip Cardenas MD  Urology Resident    Physician Attestation   I, Robbi Tran MD, saw this patient and agree with the findings and plan of care as documented in the note.      Items personally reviewed/procedural attestation: vitals, labs and imaging and agree with the interpretation documented in the note.    Robbi Tran MD  ==========================

## 2022-09-06 ENCOUNTER — VIRTUAL VISIT (OUTPATIENT)
Dept: FAMILY MEDICINE | Facility: CLINIC | Age: 52
End: 2022-09-06
Payer: COMMERCIAL

## 2022-09-06 VITALS — BODY MASS INDEX: 35.51 KG/M2 | WEIGHT: 208 LBS | HEIGHT: 64 IN

## 2022-09-06 DIAGNOSIS — E66.812 CLASS 2 SEVERE OBESITY WITH SERIOUS COMORBIDITY AND BODY MASS INDEX (BMI) OF 35.0 TO 35.9 IN ADULT, UNSPECIFIED OBESITY TYPE (H): ICD-10-CM

## 2022-09-06 DIAGNOSIS — I10 HYPERTENSION, UNSPECIFIED TYPE: Primary | ICD-10-CM

## 2022-09-06 DIAGNOSIS — K80.20 CALCULUS OF GALLBLADDER WITHOUT CHOLECYSTITIS WITHOUT OBSTRUCTION: ICD-10-CM

## 2022-09-06 DIAGNOSIS — J45.40 MODERATE PERSISTENT ASTHMA WITHOUT COMPLICATION: ICD-10-CM

## 2022-09-06 DIAGNOSIS — E66.01 CLASS 2 SEVERE OBESITY WITH SERIOUS COMORBIDITY AND BODY MASS INDEX (BMI) OF 35.0 TO 35.9 IN ADULT, UNSPECIFIED OBESITY TYPE (H): ICD-10-CM

## 2022-09-06 DIAGNOSIS — M54.16 CHRONIC LUMBAR RADICULOPATHY: ICD-10-CM

## 2022-09-06 PROCEDURE — 99214 OFFICE O/P EST MOD 30 MIN: CPT | Mod: 95 | Performed by: FAMILY MEDICINE

## 2022-09-06 NOTE — PROGRESS NOTES
Zayra is a 52 year old who is being evaluated via a billable video visit.      How would you like to obtain your AVS? MyChart  If the video visit is dropped, the invitation should be resent by: Text to cell phone: [phone]  Will anyone else be joining your video visit? No      Video-Visit Details    Video Start Time: 12:17 PM     Type of service:  Video Visit    Video End Time:12:37 PM    Originating Location (pt. Location): Home    Distant Location (provider location):  [facility]     Platform used for Video Visit: [employer]    Problem List Items Addressed This Visit        Nervous and Auditory    Chronic lumbar radiculopathy    Relevant Medications    liraglutide - Weight Management (SAXENDA) 18 MG/3ML pen       Respiratory    Moderate persistent asthma without complication       Digestive    Class 2 severe obesity with serious comorbidity and body mass index (BMI) of 35.0 to 35.9 in adult, unspecified obesity type (H)     BMI IMPROVING Body mass index is 37.59 kg/m , now down to 35.70 with Saxenda 3.0mg    SEVERE OBESITY : Initial bmi is Body mass index is 37.59 kg/m .  With the following weight related comorbid medical conditions: Hypertension, asthma, GERD, gallstones, degenerative spinal stenosis (surgeon suggested weight loss followed by surgery) and chronic back pain.  As well as she is a smoker.  And has mental illness     Contraception - had hysterectomy in 1997.      Patient declined 24 week weight loss program due to cost.      Bariatric surgery was discussed but she is not interested and she has severe mental health issues which may make this very difficult.     Medications started today: saxenda 3mg    Current goal(s): trying to get rid of mountain dew. Also considering tracking diet      Follow up 1 month.      DISCUSSION _________________________________________________________________     Dx: Initial bmi is Body mass index is 37.59 kg/m .  With the following weight  related comorbid medical conditions: Hypertension, asthma, GERD, gallstones, degenerative spinal stenosis (surgeon suggested weight loss followed by surgery) and chronic back pain.  As well as she is a smoker.  And has mental illness     BECAUSE OF ABOVE PROBLEMS IT IS STRONGLY ADVISED THAT Zayra LOSE WEIGHT.  BELOW IS MY PLAN FOR her      Start weight 219 lbs, Body mass index is 37.59 kg/m .  7/26/2022 9/6/2022 weight 208lbs. 9/6/2022     Medication notes:   Medications she takes, that are known to increase weight:abilify, zyrtec, cymbalta, methotrexate, lyrica and flexeril.   Medications she takes, known to decrease weight: none     SAXENDA  7/26/2022  - 9/6/2022 - now taking 3 mg.     Metformin  -contraindicated due to A1c of 5.3 July 2022  Wellbutrin - Contraindicated due to psych polypharmacy  Naltrexone contraindicated due to chronic pain  Contrave -contraindicated due to psychiatric polypharmacy and chronic pain  Phentermine/ diethylproprion  -contraindicated due to psychiatric polypharmacy  Topamax - could consider if saxenda not covered.   Qsymia contraindicated due to psychiatric poly pharmacy.   Orlistat could consider.   Plenity could consider.      Potential barriers to weight loss identified thus far:   -She has suffered from excess weight since her teenage years.  -Mental health issues have played a role  -Weight positive medications have played a role  -Quitting smoking has played a role in her excess weight  -Eating the wrong types of food  -Lack of exercise - due to back pain  -Chronic back pain -keeps her from being more active  - Genetics, mother and father both overweight  -She is disabled.  -Uses white carbohydrates  -Eats most of her food at the end of the day  -She has had to eat at the food shelf at least a few times this year  -Craves sweets, candy, chocolate, cheeses  -Drinks Mountain Dew but trying to cut down - 9/6/2022 switched from regular mt dew zero.       Strengths that may help  weight loss:  -She is a   -Likes vegetables  -Drinks water           Relevant Medications    liraglutide - Weight Management (SAXENDA) 18 MG/3ML pen    insulin pen needle (32G X 4 MM) 32G X 4 MM miscellaneous    Calculus of gallbladder without cholecystitis without obstruction       Circulatory    HTN (hypertension) - Primary        Subjective   Zayra is a 52 year old who presents for the following health issues   Chief Complaint   Patient presents with     Weight Loss      Are you experiencing any side effects to the medications:  none  Hunger control:  good  Exercise was discussed: she is not exercising due to back pain.  Discussed journaling food:  Not tracking.   Patient is pleased with the current results:  Yes.        Objective         Vitals:  No vitals were obtained today due to virtual visit.    Physical Exam  Constitutional:       Appearance: Normal appearance.   HENT:      Head: Normocephalic and atraumatic.   Cardiovascular:      Rate and Rhythm: Normal rate and regular rhythm.   Pulmonary:      Effort: Pulmonary effort is normal.   Musculoskeletal:         General: Normal range of motion.      Cervical back: Normal range of motion and neck supple.   Neurological:      General: No focal deficit present.      Mental Status: She is alert and oriented to person, place, and time.          This note has been dictated using voice recognition software. Any grammatical or context distortions are unintentional and inherent to the software

## 2022-09-07 ENCOUNTER — VIRTUAL VISIT (OUTPATIENT)
Dept: GASTROENTEROLOGY | Facility: CLINIC | Age: 52
End: 2022-09-07
Payer: COMMERCIAL

## 2022-09-07 VITALS — WEIGHT: 207 LBS | BODY MASS INDEX: 35.53 KG/M2

## 2022-09-07 DIAGNOSIS — R19.7 DIARRHEA, UNSPECIFIED TYPE: Primary | ICD-10-CM

## 2022-09-07 PROCEDURE — 99214 OFFICE O/P EST MOD 30 MIN: CPT | Mod: 95 | Performed by: PHYSICIAN ASSISTANT

## 2022-09-07 ASSESSMENT — PAIN SCALES - GENERAL: PAINLEVEL: SEVERE PAIN (6)

## 2022-09-07 NOTE — PROGRESS NOTES
GI CLINIC VISIT    CC/REFERRING PROVIDER: Neena Tam  REASON FOR CONSULTATION: follow-up chronic diarrhea    HPI: 52 year old female with PMH of class II obesity, ROBERT, GERD with esophagitis, COPD presenting to GI clinic for follow-up of diarrhea.    Zayra has previously followed with Dr. Rahman and Abhi Villafuerte PA-C for chronic diarrhea. Enteric testing was negative. She had some improvement in symptoms with initiation of cholestyramine, with report of 2-4 clustered loose stools daily. Given improvement, colonoscopy with random biopsies was deferred.    Today, Zayra states she has stopped cholestyramine and is not taking anything for her bowels. She is having satisfactory, formed stools without straining or sensation of incomplete evacuation once daily for the majority of the time. Every few weeks, she will develop gas pain and abdominal discomfort, followed by 3-5 clustered loose stools. Once the episode is done, she feels better. She does not know what triggers these episodes. Drinks soda with artifical sweeteners.    B- nothing  L- nuts, or cheese  D - veggies, noodles  Snacks - trying to limit, started weight plan    ROS: 10pt ROS performed and otherwise negative.    PAST MEDICAL HISTORY:  Past Medical History:   Diagnosis Date     Allergic rhinitis      Anemia      Arthritis      Asthma     copd     Dental abscess 08/2015     Depressive disorder      Gastroesophageal reflux disease      History of emphysema      Hoarseness      Hypertension      Obstructive sleep apnea      Other chronic pain      Respiratory bronchiolitis interstitial lung disease (H)      Sleep apnea      Smoker 11/02/2015       PREVIOUS ABDOMINAL/GYNECOLOGIC SURGERIES:  Past Surgical History:   Procedure Laterality Date     CERVICAL FUSION       COLONOSCOPY       COLONOSCOPY N/A 02/06/2020    Procedure: COLONOSCOPY, WITH POLYPECTOMY AND BIOPSY;  Surgeon: Julian Mccullough MD;  Location:  GI     ENT SURGERY        ESOPHAGOSCOPY, GASTROSCOPY, DUODENOSCOPY (EGD), COMBINED N/A 02/06/2020    Procedure: ESOPHAGOGASTRODUODENOSCOPY (EGD);  Surgeon: Julian Mccullough MD;  Location: [facility]     EXCISE LESION INTRAORAL Bilateral 10/03/2018    Procedure: EXCISE LESION INTRAORAL;  Wide Local Excision Of of Left Oral Cavity Ulcer;  Surgeon: Morro Mijares MD;  Location: [facility] OR     HC DRAIN SKIN ABSCESS SIMPLE/SINGLE  03/16/2012    Procedure:INCISION AND DRAINAGE, ABSCESS, SIMPLE; Surgeon:CHRISTIANO HANCOCK; Location: GI     HEAD & NECK SURGERY       HYSTERECTOMY       HYSTERECTOMY       INCISION AND DRAINAGE ABDOMEN WASHOUT, COMBINED       INJECT EPIDURAL CERVICAL N/A 10/14/2021    Procedure: Cervical 7- Thoracic 1 epidural steroid injection with fluoroscopy;  Surgeon: Tram Florian MD;  Location: [facility] OR     INJECT EPIDURAL LUMBAR Right 09/15/2020    Procedure: Lumbar5- sacral 1 epidural steroid injection with fluoroscopy;  Surgeon: Tram Florian MD;  [facility]     INJECT EPIDURAL LUMBAR Right 06/29/2021    Procedure: Lumbar 5 sacral 1 epidural steroid injection with fluoroscopy;  Surgeon: Tram Florian MD;  Location: [facility] OR     INJECT SACROILIAC JOINT Bilateral 06/16/2020    Procedure: Bilateral sacroiliac joint steroid injection with fluoroscopy;  Surgeon: Tram Florian MD;  [facility]     LAMINECTOMY THORACIC ONE LEVEL N/A 08/19/2019    Procedure: LAMINECTOMY, SPINE, THORACIC, 11-12 and Part of Lumbar 1, DRAINAGE OF EPIDURAL ABCESS, Epidural Drain Placement X 2;  Surgeon: Yadiel Beal MD;  Location: [facility] OR     DC PERCUT IMPLNT NEUROELECT,EPIDURAL N/A 08/08/2019    Procedure: TRIAL, SPINAL CORD STIMULATOR WITH BOSTON SCIENTIFIC;  Surgeon: Sipple, Daniel Peter, DO;  [facility]: [facility];  Service: Pain     RADIO FREQUENCY ABLATION / DESTRUCTION OF SACROILOAC JOINT DORSAL PRIMARY RAMUS Bilateral 12/17/2019    Procedure: Bilateral lumbar radiofrequency ablation with fluoroscopy and  intravenous sedation ( Lumbar 2,3,4,5 medial branch nerves for the bilateral lumbar3-4, 4-5 and 5-sacral1 joints.;  Surgeon: Tram Florian MD;  [facility]     RADIO FREQUENCY ABLATION / DESTRUCTION OF SACROILOAC JOINT DORSAL PRIMARY RAMUS Right 11/17/2020    Procedure: Right lumbar medial branch nerve radiofrequency ablation right L2,3,4,5 nerves supplying the right L3-4, L4-5 and L5-S1 facet joints;  Surgeon: Tram Florian MD;  Location: [facility] OR     spinal cord stimulator  08/08/2019     spinal cord stimulator removal  08/13/2019         PERTINENT MEDICATIONS:  Current Outpatient Medications   Medication Sig Dispense Refill     albuterol (PROAIR HFA/PROVENTIL HFA/VENTOLIN HFA) 108 (90 Base) MCG/ACT inhaler Inhale 2 puffs into the lungs every 6 hours as needed for shortness of breath / dyspnea or wheezing Ventolin please 18 g 2     ARIPiprazole (ABILIFY) 5 MG tablet Take 5 mg by mouth daily       Black Pepper-Turmeric (TURMERIC CURCUMIN) 5-1000 MG CAPS Take 1 capsule by mouth daily       busPIRone HCl (BUSPAR) 30 MG tablet Take 30 mg by mouth 2 times daily        carboxymethylcellulose (CARBOXYMETHYLCELLULOSE SODIUM) 0.5 % SOLN ophthalmic solution Place 1 drop into both eyes 3 times daily as needed for dry eyes 15 mL 11     celecoxib (CELEBREX) 200 MG capsule Take 1 capsule (200 mg) by mouth every morning 30 capsule 3     cyclobenzaprine (FLEXERIL) 10 MG tablet Take 1 tablet (10 mg) by mouth At Bedtime 90 tablet 1     diazepam (VALIUM) 5 MG tablet Take 2 tablets (10 mg) by mouth once as needed for anxiety (to be taken ~ 20 mins prior to MRI) 2 tablet 0     DULoxetine (CYMBALTA) 60 MG capsule Take 120 mg by mouth At Bedtime        EPINEPHrine (EPIPEN/ADRENACLICK/OR ANY BX GENERIC EQUIV) 0.3 MG/0.3ML injection 2-pack        folic acid (FOLVITE) 1 MG tablet Take 1 tablet (1 mg) by mouth daily 90 tablet 3     insulin pen needle (32G X 4 MM) 32G X 4 MM miscellaneous Use 1 NEEDLE WEEKLY 12 each 3     insulin pen  needle (32G X 4 MM) 32G X 4 MM miscellaneous Use 1 NEEDLE daily to use with Saxenda 100 each 3     insulin pen needle (32G X 6 MM) 32G X 6 MM miscellaneous Use daily pen needles daily or as directed. 100 each 2     liraglutide - Weight Management (SAXENDA) 18 MG/3ML pen Inject 3 mg Subcutaneous daily 15 mL 0     lisinopril-hydrochlorothiazide (ZESTORETIC) 20-25 MG tablet Take 1 tablet by mouth daily 90 tablet 3     magnesium oxide (MAG-OX) 400 MG tablet Take 400 mg by mouth daily       methotrexate sodium 2.5 MG TABS Take 10 tablets (25 mg) by mouth once a week  120 tablet 2     montelukast (SINGULAIR) 10 MG tablet Take 10 mg by mouth At Bedtime       omeprazole (PRILOSEC) 40 MG DR capsule Take 1 capsule (40 mg) by mouth daily (Patient taking differently: Take 40 mg by mouth every evening) 180 capsule 3     OXYGEN-HELIUM IN 3L @ night    Oxygen only not helium       potassium 99 MG TABS Take 1 tablet by mouth daily       pregabalin (LYRICA) 150 MG capsule Take 1 capsule (150 mg) by mouth 2 times daily 60 capsule 3     Respiratory Therapy Supplies ([facility] CPAP FILTER) MISC        rOPINIRole (REQUIP) 0.5 MG tablet Take 1 tablet (0.5 mg) by mouth At Bedtime Take 1 tab by mouth at bedtime. 90 tablet 1     vitamin D3 (CHOLECALCIFEROL) 250 mcg (52572 units) capsule Take 1 capsule by mouth daily       zinc sulfate (ZINCATE) 220 (50 Zn) MG capsule Take 220 mg by mouth daily (with lunch)        SOCIAL HISTORY:    Social History     Socioeconomic History     Marital status: Single     Spouse name: Not on file     Number of children: Not on file     Years of education: Not on file     Highest education level: Not on file   Occupational History     Not on file   Tobacco Use     Smoking status: Former Smoker     Packs/day: 1.00     Years: 30.00     Pack years: 30.00     Types: Cigarettes     Start date: 1996     Quit date: 2021     Years since quittin.8     Smokeless tobacco: Never Used   Vaping  Use     Vaping Use: Former   Substance and Sexual Activity     Alcohol use: No     Drug use: Yes     Types: Marijuana     Comment: very rarely      Sexual activity: Never   Other Topics Concern     Parent/sibling w/ CABG, MI or angioplasty before 65F 55M? Not Asked   Social History Narrative     Not on file     Social Determinants of Health     Financial Resource Strain: Not on file   Food Insecurity: Not on file   Transportation Needs: Not on file   Physical Activity: Not on file   Stress: Not on file   Social Connections: Not on file   Intimate Partner Violence: Not on file   Housing Stability: Not on file       FAMILY HISTORY:  Family History   Problem Relation Age of Onset     Asthma Mother      Restless Leg Syndrome Mother      Other Cancer Father         base of tongue cancer at age ~65     Hypertension Father      Back Pain Father      Restless Leg Syndrome Father      Coronary Artery Disease Father      Restless Leg Syndrome Sister      Breast Cancer Maternal Aunt 55     Anesthesia Reaction No family hx of      Deep Vein Thrombosis (DVT) No family hx of      Thyroid Cancer No family hx of      Multiple endocrine neoplasia No family hx of        PHYSICAL EXAMINATION:  Vitals reviewed  LMP  (LMP Unknown)   Video physical exam  General: Patient appears well in no acute distress.   Skin: No visualized rash or lesions on visualized skin  Eyes: EOMI, no erythema, sclera icterus or discharge noted  Resp: Appears to be breathing comfortably without accessory muscle usage, speaking in full sentences, no cough  MSK: Appears to have normal range of motion based on visualized movements  Neurologic: No apparent tremors, facial movements symmetric  Psych: affect normal, alert and oriented    The rest of a comprehensive physical examination is deferred due to PHE (public health emergency) video restrictions      PERTINENT STUDIES Reviewed in EMR    ASSESSMENT/PLAN:    # Loose stools, intermittent  Previously with increased  symptoms, now with episodic loose stools occurring every few weeks, with satisfactory stools without associated concerns the majority of the time. She has made a lot of positive changes in her diet, however on diet recall, does still have a fiber limited diet, which could be contributing to her symptoms. We reviewed a trial of fiber supplementation for at least 3-6 months and monitoring for any improvement in symptoms.    RTC 4-6 months or PRN    Thank you for this consultation. It was a pleasure to participate in the care of this patient; please contact us with any further questions.    Dior Arroyo PA-C    30 nminutes spent on the date of the encounter doing chart review, review of test results, patient visit and documentation

## 2022-09-07 NOTE — LETTER
9/7/2022     RE: Zayra Ramirez  [id number] [place] [address]    Dear Colleague,    Thank you for referring your patient, Zayra Ramirez, to the [facility] [place]. Please see a copy of my visit note below.    GI CLINIC VISIT    CC/REFERRING PROVIDER: Neena Tam  REASON FOR CONSULTATION: follow-up chronic diarrhea    HPI: 52 year old female with PMH of class II obesity, ROBERT, GERD with esophagitis, COPD presenting to GI clinic for follow-up of diarrhea.    Zayra has previously followed with Dr. Rahman and Abhi Villafuerte PA-C for chronic diarrhea. Enteric testing was negative. She had some improvement in symptoms with initiation of cholestyramine, with report of 2-4 clustered loose stools daily. Given improvement, colonoscopy with random biopsies was deferred.    Today, Zayra states she has stopped cholestyramine and is not taking anything for her bowels. She is having satisfactory, formed stools without straining or sensation of incomplete evacuation once daily for the majority of the time. Every few weeks, she will develop gas pain and abdominal discomfort, followed by 3-5 clustered loose stools. Once the episode is done, she feels better. She does not know what triggers these episodes. Drinks soda with artifical sweeteners.    B- nothing  L- nuts, or cheese  D - veggies, noodles  Snacks - trying to limit, started weight plan    ROS: 10pt ROS performed and otherwise negative.    PAST MEDICAL HISTORY:  Past Medical History:   Diagnosis Date     Allergic rhinitis      Anemia      Arthritis      Asthma     copd     Dental abscess 08/2015     Depressive disorder      Gastroesophageal reflux disease      History of emphysema      Hoarseness      Hypertension      Obstructive sleep apnea      Other chronic pain      Respiratory bronchiolitis interstitial lung disease (H)      Sleep apnea      Smoker 11/02/2015       PREVIOUS ABDOMINAL/GYNECOLOGIC  SURGERIES:  Past Surgical History:   Procedure Laterality Date     CERVICAL FUSION       COLONOSCOPY       COLONOSCOPY N/A 02/06/2020    Procedure: COLONOSCOPY, WITH POLYPECTOMY AND BIOPSY;  Surgeon: Julian Mccullough MD;  Location:  GI     ENT SURGERY       ESOPHAGOSCOPY, GASTROSCOPY, DUODENOSCOPY (EGD), COMBINED N/A 02/06/2020    Procedure: ESOPHAGOGASTRODUODENOSCOPY (EGD);  Surgeon: Julian Mccullough MD;  Location:  GI     EXCISE LESION INTRAORAL Bilateral 10/03/2018    Procedure: EXCISE LESION INTRAORAL;  Wide Local Excision Of of Left Oral Cavity Ulcer;  Surgeon: Morro Mijares MD;  Location: [facility] OR     HC DRAIN SKIN ABSCESS SIMPLE/SINGLE  03/16/2012    Procedure:INCISION AND DRAINAGE, ABSCESS, SIMPLE; Surgeon:CHRISTIANO HANCOCK; Location: GI     HEAD & NECK SURGERY       HYSTERECTOMY       HYSTERECTOMY       INCISION AND DRAINAGE ABDOMEN WASHOUT, COMBINED       INJECT EPIDURAL CERVICAL N/A 10/14/2021    Procedure: Cervical 7- Thoracic 1 epidural steroid injection with fluoroscopy;  Surgeon: Tram Florian MD;  Location: [facility] OR     INJECT EPIDURAL LUMBAR Right 09/15/2020    Procedure: Lumbar5- sacral 1 epidural steroid injection with fluoroscopy;  Surgeon: Tram Florian MD;  [facility]     INJECT EPIDURAL LUMBAR Right 06/29/2021    Procedure: Lumbar 5 sacral 1 epidural steroid injection with fluoroscopy;  Surgeon: Tram Florian MD;  Location: [facility] OR     INJECT SACROILIAC JOINT Bilateral 06/16/2020    Procedure: Bilateral sacroiliac joint steroid injection with fluoroscopy;  Surgeon: Tram Florian MD;  [facility]     LAMINECTOMY THORACIC ONE LEVEL N/A 08/19/2019    Procedure: LAMINECTOMY, SPINE, THORACIC, 11-12 and Part of Lumbar 1, DRAINAGE OF EPIDURAL ABCESS, Epidural Drain Placement X 2;  Surgeon: Yadiel Beal MD;  Location: [facility] OR     AR PERCUT IMPLNT NEUROELECT,EPIDURAL N/A 08/08/2019    Procedure: TRIAL, SPINAL CORD STIMULATOR WITH BOSTON SCIENTIFIC;   Surgeon: Sipple, Daniel Peter, DO;  Location: [facility];  Service: Pain     RADIO FREQUENCY ABLATION / DESTRUCTION OF SACROILOAC JOINT DORSAL PRIMARY RAMUS Bilateral 12/17/2019    Procedure: Bilateral lumbar radiofrequency ablation with fluoroscopy and intravenous sedation ( Lumbar 2,3,4,5 medial branch nerves for the bilateral lumbar3-4, 4-5 and 5-sacral1 joints.;  Surgeon: Tarm Florian MD;  Location:  OR     RADIO FREQUENCY ABLATION / DESTRUCTION OF SACROILOAC JOINT DORSAL PRIMARY RAMUS Right 11/17/2020    Procedure: Right lumbar medial branch nerve radiofrequency ablation right L2,3,4,5 nerves supplying the right L3-4, L4-5 and L5-S1 facet joints;  Surgeon: Tram Florian MD;  Location: [facility] OR     spinal cord stimulator  08/08/2019     spinal cord stimulator removal  08/13/2019         PERTINENT MEDICATIONS:  Current Outpatient Medications   Medication Sig Dispense Refill     albuterol (PROAIR HFA/PROVENTIL HFA/VENTOLIN HFA) 108 (90 Base) MCG/ACT inhaler Inhale 2 puffs into the lungs every 6 hours as needed for shortness of breath / dyspnea or wheezing Ventolin please 18 g 2     ARIPiprazole (ABILIFY) 5 MG tablet Take 5 mg by mouth daily       Black Pepper-Turmeric (TURMERIC CURCUMIN) 5-1000 MG CAPS Take 1 capsule by mouth daily       busPIRone HCl (BUSPAR) 30 MG tablet Take 30 mg by mouth 2 times daily        carboxymethylcellulose (CARBOXYMETHYLCELLULOSE SODIUM) 0.5 % SOLN ophthalmic solution Place 1 drop into both eyes 3 times daily as needed for dry eyes 15 mL 11     celecoxib (CELEBREX) 200 MG capsule Take 1 capsule (200 mg) by mouth every morning 30 capsule 3     cyclobenzaprine (FLEXERIL) 10 MG tablet Take 1 tablet (10 mg) by mouth At Bedtime 90 tablet 1     diazepam (VALIUM) 5 MG tablet Take 2 tablets (10 mg) by mouth once as needed for anxiety (to be taken ~ 20 mins prior to MRI) 2 tablet 0     DULoxetine (CYMBALTA) 60 MG capsule Take 120 mg by mouth At Bedtime        EPINEPHrine  (EPIPEN/ADRENACLICK/OR ANY BX GENERIC EQUIV) 0.3 MG/0.3ML injection 2-pack        folic acid (FOLVITE) 1 MG tablet Take 1 tablet (1 mg) by mouth daily 90 tablet 3     insulin pen needle (32G X 4 MM) 32G X 4 MM miscellaneous Use 1 NEEDLE WEEKLY 12 each 3     insulin pen needle (32G X 4 MM) 32G X 4 MM miscellaneous Use 1 NEEDLE daily to use with Saxenda 100 each 3     insulin pen needle (32G X 6 MM) 32G X 6 MM miscellaneous Use daily pen needles daily or as directed. 100 each 2     liraglutide - Weight Management (SAXENDA) 18 MG/3ML pen Inject 3 mg Subcutaneous daily 15 mL 0     lisinopril-hydrochlorothiazide (ZESTORETIC) 20-25 MG tablet Take 1 tablet by mouth daily 90 tablet 3     magnesium oxide (MAG-OX) 400 MG tablet Take 400 mg by mouth daily       methotrexate sodium 2.5 MG TABS Take 10 tablets (25 mg) by mouth once a week MONDAYS 120 tablet 2     montelukast (SINGULAIR) 10 MG tablet Take 10 mg by mouth At Bedtime       omeprazole (PRILOSEC) 40 MG DR capsule Take 1 capsule (40 mg) by mouth daily (Patient taking differently: Take 40 mg by mouth every evening) 180 capsule 3     OXYGEN-HELIUM IN 3L @ night    Oxygen only not helium       potassium 99 MG TABS Take 1 tablet by mouth daily       pregabalin (LYRICA) 150 MG capsule Take 1 capsule (150 mg) by mouth 2 times daily 60 capsule 3     Respiratory Therapy Supplies ([facility] CPAP FILTER) MISC        rOPINIRole (REQUIP) 0.5 MG tablet Take 1 tablet (0.5 mg) by mouth At Bedtime Take 1 tab by mouth at bedtime. 90 tablet 1     vitamin D3 (CHOLECALCIFEROL) 250 mcg (30543 units) capsule Take 1 capsule by mouth daily       zinc sulfate (ZINCATE) 220 (50 Zn) MG capsule Take 220 mg by mouth daily (with lunch)        SOCIAL HISTORY:    Social History     Socioeconomic History     Marital status: Single     Spouse name: Not on file     Number of children: Not on file     Years of education: Not on file     Highest education level: Not on file   Occupational History      Not on file   Tobacco Use     Smoking status: Former Smoker     Packs/day: 1.00     Years: 30.00     Pack years: 30.00     Types: Cigarettes     Start date: 1996     Quit date: 2021     Years since quittin.8     Smokeless tobacco: Never Used   Vaping Use     Vaping Use: Former   Substance and Sexual Activity     Alcohol use: No     Drug use: Yes     Types: Marijuana     Comment: very rarely      Sexual activity: Never   Other Topics Concern     Parent/sibling w/ CABG, MI or angioplasty before 65F 55M? Not Asked   Social History Narrative     Not on file     Social Determinants of Health     Financial Resource Strain: Not on file   Food Insecurity: Not on file   Transportation Needs: Not on file   Physical Activity: Not on file   Stress: Not on file   Social Connections: Not on file   Intimate Partner Violence: Not on file   Housing Stability: Not on file       FAMILY HISTORY:  Family History   Problem Relation Age of Onset     Asthma Mother      Restless Leg Syndrome Mother      Other Cancer Father         base of tongue cancer at age ~65     Hypertension Father      Back Pain Father      Restless Leg Syndrome Father      Coronary Artery Disease Father      Restless Leg Syndrome Sister      Breast Cancer Maternal Aunt 55     Anesthesia Reaction No family hx of      Deep Vein Thrombosis (DVT) No family hx of      Thyroid Cancer No family hx of      Multiple endocrine neoplasia No family hx of        PHYSICAL EXAMINATION:  Vitals reviewed  LMP  (LMP Unknown)   Video physical exam  General: Patient appears well in no acute distress.   Skin: No visualized rash or lesions on visualized skin  Eyes: EOMI, no erythema, sclera icterus or discharge noted  Resp: Appears to be breathing comfortably without accessory muscle usage, speaking in full sentences, no cough  MSK: Appears to have normal range of motion based on visualized movements  Neurologic: No apparent tremors, facial movements symmetric  Psych:  affect normal, alert and oriented    The rest of a comprehensive physical examination is deferred due to PHE (public health emergency) video restrictions      PERTINENT STUDIES Reviewed in EMR    ASSESSMENT/PLAN:    # Loose stools, intermittent  Previously with increased symptoms, now with episodic loose stools occurring every few weeks, with satisfactory stools without associated concerns the majority of the time. She has made a lot of positive changes in her diet, however on diet recall, does still have a fiber limited diet, which could be contributing to her symptoms. We reviewed a trial of fiber supplementation for at least 3-6 months and monitoring for any improvement in symptoms.    RTC 4-6 months or PRN    Thank you for this consultation. It was a pleasure to participate in the care of this patient; please contact us with any further questions.    Dior Arroyo PA-C    30 nminutes spent on the date of the encounter doing chart review, review of test results, patient visit and documentation        Zayra is a 52 year old who is being evaluated via a billable video visit.      How would you like to obtain your AVS? MyChart  If the video visit is dropped, the invitation should be resent by: Send to e-mail at: [email or web address]  Will anyone else be joining your video visit? No      Video-Visit Details    Video Start Time: 135    Type of service:  Video Visit    Video End Time:152    Originating Location (pt. Location): Home    Distant Location (provider location):  [facility] [place]     Platform used for Video Visit: [email or web address]    Again, thank you for allowing me to participate in the care of your patient.      Sincerely,    Dior Arroyo PA-C

## 2022-09-07 NOTE — PATIENT INSTRUCTIONS
It was a pleasure taking care of you today.  I've included a brief summary of our discussion and care plan from today's visit below.  Please review this information with your primary care provider.  _______________________________________________________________________    My recommendations are summarized as follows:  -- Recommend starting supplementation with a powdered soluble fiber. When used on a daily basis, this can help regulate the consistency of your stools. Metamucil (psyllium) and Citrucel are preferred examples. You can start with 1-2 teaspoons per day, with goal to gradually increase to 1 tablespoon daily. You can increase up to 1 tablespoon three times daily if needed. It is important to stay well-hydrated with use of fiber supplementation and to make sure that the fiber powder is well mixed with water as directed on the label. You may experience some bloating with initiation of fiber, which will improve over the first few weeks. A good trial to evaluate the effect is 3-6 months. Of note, many of the fiber products contain artificial sweeteners, which can cause bloating, gas, and diarrhea in those who may be sensitive to artificial sweeteners. If this is the case, recommend trying Metamucil Premium Blend (with Stevia), Metamucil 4-in-1 without Added Sweeteners, or Bellway (sweetened with Monk fruit extract).    Return to GI Clinic in 4-6 months or as needed to review your progress.    ______________________________________________________________________    How do I schedule labs, imaging studies, or procedures that were ordered in clinic today?     Labs: To schedule lab appointment at the [facility], use my chart or call [phone]. If you have a [facility] lab closer to home where you are regularly seen you can give them a call.     Procedures: If a colonoscopy, upper endoscopy, breath test, esophageal manometry, or pH impedence was ordered today, our endoscopy team will call you to  schedule this. If you have not heard from our endoscopy team within a week, please call [phone] to schedule.     Imaging Studies: If you were scheduled for a CT scan, X-ray, MRI, ultrasound, HIDA scan or other imaging study, please call [phone] to have this scheduled.     Referral: If a referral to another specialty was ordered, expect a phone call or follow instructions above. If you have not heard from anyone regarding your referral in a week, please call our clinic to check the status.     Who do I call with any questions after my visit?  Please be in touch if there are any further questions that arise following today's visit.  There are multiple ways to contact your gastroenterology care team.      During business hours, you may reach a Gastroenterology nurse at [phone]    To schedule or reschedule an appointment, please call [phone].     You can always send a secure message through [employer].  [employer] messages are answered by your nurse or doctor typically within 24 hours.  Please allow extra time on weekends and holidays.      For urgent/emergent questions after business hours, you may reach the on-call GI Fellow by contacting the [facility]  at [phone].     How will I get the results of any tests ordered?    You will receive all of your results.  If you have signed up for [employer], any tests ordered at your visit will be available to you after your physician reviews them.  Typically this takes 1-2 weeks.  If there are urgent results that require a change in your care plan, your physician or nurse will call you to discuss the next steps.      What is [employer]?  [employer] is a secure way for you to access all of your healthcare records from the [facility].  It is a web based computer program, so you can sign on to it from any location.  It also allows you to send secure messages to your care team.  I recommend signing up for [employer] access if you have not  already done so and are comfortable with using a computer.      How to I schedule a follow-up visit?  If you did not schedule a follow-up visit today, please call [phone] to schedule a follow-up office visit.      Sincerely,    Dior Arroyo PA-C  Division of Gastroenterology, Hepatology & Nutrition  [facility]

## 2022-09-07 NOTE — PROGRESS NOTES
Zayra is a 52 year old who is being evaluated via a billable video visit.      How would you like to obtain your AVS? MyChart  If the video visit is dropped, the invitation should be resent by: Send to e-mail at: [email or web address]  Will anyone else be joining your video visit? No      Video-Visit Details    Video Start Time: 135    Type of service:  Video Visit    Video End Time:152    Originating Location (pt. Location): Home    Distant Location (provider location):  [facility] [place]     Platform used for Video Visit: [employer]

## 2022-09-09 ENCOUNTER — LAB (OUTPATIENT)
Dept: LAB | Facility: CLINIC | Age: 52
End: 2022-09-09
Payer: COMMERCIAL

## 2022-09-09 DIAGNOSIS — F17.200 SMOKER: ICD-10-CM

## 2022-09-09 DIAGNOSIS — M81.0 AGE-RELATED OSTEOPOROSIS WITHOUT CURRENT PATHOLOGICAL FRACTURE: ICD-10-CM

## 2022-09-09 DIAGNOSIS — Z79.899 HIGH RISK MEDICATION USE: ICD-10-CM

## 2022-09-09 DIAGNOSIS — M19.90 INFLAMMATORY ARTHRITIS: ICD-10-CM

## 2022-09-09 LAB
ALBUMIN SERPL BCG-MCNC: 4.1 G/DL (ref 3.5–5.2)
ALP SERPL-CCNC: 66 U/L (ref 35–104)
ALT SERPL W P-5'-P-CCNC: 23 U/L (ref 10–35)
ANION GAP SERPL CALCULATED.3IONS-SCNC: 11 MMOL/L (ref 7–15)
AST SERPL W P-5'-P-CCNC: 28 U/L (ref 10–35)
BILIRUB SERPL-MCNC: 0.3 MG/DL
BUN SERPL-MCNC: 14.1 MG/DL (ref 6–20)
CALCIUM SERPL-MCNC: 9.6 MG/DL (ref 8.6–10)
CHLORIDE SERPL-SCNC: 100 MMOL/L (ref 98–107)
CREAT SERPL-MCNC: 0.97 MG/DL (ref 0.51–0.95)
CRP SERPL-MCNC: 14.23 MG/L
DEPRECATED CALCIDIOL+CALCIFEROL SERPL-MC: 81 UG/L (ref 20–75)
DEPRECATED HCO3 PLAS-SCNC: 30 MMOL/L (ref 22–29)
ERYTHROCYTE [DISTWIDTH] IN BLOOD BY AUTOMATED COUNT: 15.3 % (ref 10–15)
GFR SERPL CREATININE-BSD FRML MDRD: 70 ML/MIN/1.73M2
GLUCOSE SERPL-MCNC: 91 MG/DL (ref 70–99)
HCT VFR BLD AUTO: 36.4 % (ref 35–47)
HGB BLD-MCNC: 12 G/DL (ref 11.7–15.7)
MCH RBC QN AUTO: 32.2 PG (ref 26.5–33)
MCHC RBC AUTO-ENTMCNC: 33 G/DL (ref 31.5–36.5)
MCV RBC AUTO: 98 FL (ref 78–100)
PLATELET # BLD AUTO: 304 10E3/UL (ref 150–450)
POTASSIUM SERPL-SCNC: 4.1 MMOL/L (ref 3.4–5.3)
PROT SERPL-MCNC: 6.7 G/DL (ref 6.4–8.3)
RBC # BLD AUTO: 3.73 10E6/UL (ref 3.8–5.2)
SODIUM SERPL-SCNC: 141 MMOL/L (ref 136–145)
WBC # BLD AUTO: 7.5 10E3/UL (ref 4–11)

## 2022-09-09 PROCEDURE — 86140 C-REACTIVE PROTEIN: CPT

## 2022-09-09 PROCEDURE — 82040 ASSAY OF SERUM ALBUMIN: CPT

## 2022-09-09 PROCEDURE — 80053 COMPREHEN METABOLIC PANEL: CPT

## 2022-09-09 PROCEDURE — 82306 VITAMIN D 25 HYDROXY: CPT

## 2022-09-09 PROCEDURE — 85027 COMPLETE CBC AUTOMATED: CPT

## 2022-09-09 PROCEDURE — 36415 COLL VENOUS BLD VENIPUNCTURE: CPT

## 2022-09-12 ENCOUNTER — TELEPHONE (OUTPATIENT)
Dept: PHYSICAL MEDICINE AND REHAB | Facility: CLINIC | Age: 52
End: 2022-09-12

## 2022-09-12 NOTE — TELEPHONE ENCOUNTER
"Writer spoke to Infusion finance as well as infusion scheduling.  Order of 8/31/22 is still listed as \"pending review\"    Received instruction to have pt call to book the infusion and then the PA will be started.    Called to Zayra and gave her phone # to [facility] to set up an appointment.    [phone]    Asked Zayra to call this writer if there are any other hurdles in getting this done.    Zayra's surgery is scheduled for Nov 8 with Dr. Wilhelm, therefore there is time to get two injections done prior to surgery, barring any other delays.    Donna Rogers RN on 9/12/2022 at 10:13 AM    "

## 2022-09-14 NOTE — TELEPHONE ENCOUNTER
Pt is booked as follows at the [facility].  We expect the prior auth will be done prior to this appointment.      Date: 9/22/2022 Status: Dania   Arrive By:       Appointment Time: 3:00 PM Length: 60   Visit Type: INFUSION 1 HR (60 MIN) [663] REHANA: [id number]   Provider: PAULA FAST TRACK PLUS 2 Department:  [facility]   Special Needs:   Comments:     Referral Number:   Referral Status:         CSN: [id number]   Notes: EVENITY; Yeimy Guerra   Made On: 9/12/2022 10:20 AM By: YEIMY RIZO [[id number]] (ES)   Outcome         Appointment Instructions   Patient Instructions    Visit Type: INFUSION 1 HR (60 MIN)  Approval from your insurance company for this treatment was not yet finalized when this appointment was made. If insurance-related concerns are identified between now and your appointment, you may be contacted to determine the best way to get your treatment.    Panel:      Reason for early arrival:      Directions to Department    Department Location: Located at: [id number], [address] [place] [address]

## 2022-09-19 ENCOUNTER — ANCILLARY PROCEDURE (OUTPATIENT)
Dept: MRI IMAGING | Facility: CLINIC | Age: 52
End: 2022-09-19
Attending: ORTHOPAEDIC SURGERY
Payer: COMMERCIAL

## 2022-09-19 ENCOUNTER — TELEPHONE (OUTPATIENT)
Dept: ORTHOPEDICS | Facility: CLINIC | Age: 52
End: 2022-09-19

## 2022-09-19 DIAGNOSIS — M47.816 LUMBAR SPONDYLOSIS: ICD-10-CM

## 2022-09-19 DIAGNOSIS — M54.16 LUMBAR RADICULOPATHY: ICD-10-CM

## 2022-09-19 DIAGNOSIS — M48.062 SPINAL STENOSIS OF LUMBAR REGION WITH NEUROGENIC CLAUDICATION: ICD-10-CM

## 2022-09-19 DIAGNOSIS — M40.35 FLATBACK SYNDROME OF THORACOLUMBAR REGION: ICD-10-CM

## 2022-09-19 PROCEDURE — 72146 MRI CHEST SPINE W/O DYE: CPT | Mod: GC | Performed by: RADIOLOGY

## 2022-09-19 NOTE — TELEPHONE ENCOUNTER
Received voicemail from patient who stated that she was confused due to receiving two surgery packets with conflicting dates regarding her PAC visit and surgery date.     Phoned patient,   Stated that she had reach out to someone else who explained that the date of 11/08/22 was not her surgery date, but rather it is on 11/30/22.     I explained to the patient that the information she received is correct. Went over her dates and times with the patient.     Patient had no further questions or concerns.

## 2022-09-20 ENCOUNTER — TELEPHONE (OUTPATIENT)
Dept: PHYSICAL MEDICINE AND REHAB | Facility: CLINIC | Age: 52
End: 2022-09-20

## 2022-09-20 NOTE — TELEPHONE ENCOUNTER
----- Message from Ousmane Ma sent at 9/20/2022 12:00 PM CDT -----  Regarding: EVENITY INFUSION  Hello Dr. Guerra,    We are working on clearing Zayra's Evenity infusion scheduled on 09.22.22. Per [facility]'s approval coverage for Evenity patient does not meet policy - patient has not tried/failed Boniva or Reclast.   Because of this we would need you to provide a letter of medical necessity that we can submit with the authorization.  It would be helpful to mention if patient is considered high risk for fracture, reasons as to why Boniva or Relcast was not tried, details as to why bisphosphonate are contraindicated for the patient and anything else you feel might help with getting the authorization approved. Please advise when the LMN has been completed so that I can get that authorization request submitted.   Since we need to do an authorization, it may be a good idea to have patient postpone her appointment a few days until we can get that taken care of. Would someone on your care team be able to contact her about rescheduling?    Please make sure to reply all to all messages.    Thank you,  Ousmane Ma  Infusion  ([place])  Ph: [phone]

## 2022-09-20 NOTE — TELEPHONE ENCOUNTER
Writer left message to Zayra that she cannot get her injection 9/22 as planned, pending insurance authorization.  Insurance has requested additional information from Dr Guerra so we or the infusion center will update when we have the approval.  Donna Rogers RN on 9/20/2022 at 2:16 PM

## 2022-09-26 DIAGNOSIS — G25.81 RESTLESS LEG SYNDROME: ICD-10-CM

## 2022-09-26 RX ORDER — PREGABALIN 150 MG/1
150 CAPSULE ORAL 2 TIMES DAILY
Qty: 60 CAPSULE | Refills: 3 | Status: SHIPPED | OUTPATIENT
Start: 2022-09-26 | End: 2022-10-19

## 2022-09-26 NOTE — TELEPHONE ENCOUNTER
Pending Prescriptions:                       Disp   Refills    pregabalin (LYRICA) 150 MG capsule        60 cap*3            Sig: Take 1 capsule (150 mg) by mouth 2 times daily          Last Written Prescription Date:  08/21/2022  Last Fill Quantity: 60,   # refills: 0  Last Office Visit: 04/04/2022  Future Office visit:      Routing refill request to provider for review/approval because:  Drug not on the [facility], [facility] or [facility] refill protocol or controlled substance

## 2022-09-29 DIAGNOSIS — G25.81 RESTLESS LEG SYNDROME: ICD-10-CM

## 2022-09-29 NOTE — TELEPHONE ENCOUNTER
Pending Prescriptions:                       Disp   Refills    rOPINIRole (REQUIP) 0.5 MG tablet         90 tab*1            Sig: Take 1 tablet (0.5 mg) by mouth At Bedtime Take 1           tab by mouth at bedtime.        Last Written Prescription Date:  07/01/2022  Last Fill Quantity: 90,   # refills: 0  Last Office Visit: 04/04/2022  Future Office visit:      Routing refill request to provider for review/approval because:  Drug not on the [facility], P or [facility] refill protocol or controlled substance

## 2022-09-30 ENCOUNTER — PRE VISIT (OUTPATIENT)
Dept: NEUROSURGERY | Facility: CLINIC | Age: 52
End: 2022-09-30

## 2022-09-30 ENCOUNTER — PRE VISIT (OUTPATIENT)
Dept: UROLOGY | Facility: CLINIC | Age: 52
End: 2022-09-30

## 2022-09-30 RX ORDER — ROPINIROLE 0.5 MG/1
0.5 TABLET, FILM COATED ORAL AT BEDTIME
Qty: 90 TABLET | Refills: 1 | Status: SHIPPED | OUTPATIENT
Start: 2022-10-05 | End: 2023-04-02

## 2022-09-30 NOTE — TELEPHONE ENCOUNTER
Last prescription was written 4/12/22 for 6 months. I refilled the ropinirole with a start date of 10/5.  Bennett Goltz, PA-C

## 2022-10-04 ENCOUNTER — TELEPHONE (OUTPATIENT)
Dept: NEUROSURGERY | Facility: CLINIC | Age: 52
End: 2022-10-04

## 2022-10-04 ENCOUNTER — VIRTUAL VISIT (OUTPATIENT)
Dept: SURGERY | Facility: CLINIC | Age: 52
End: 2022-10-04
Payer: COMMERCIAL

## 2022-10-04 DIAGNOSIS — E66.811 CLASS 1 OBESITY DUE TO EXCESS CALORIES WITHOUT SERIOUS COMORBIDITY WITH BODY MASS INDEX (BMI) OF 34.0 TO 34.9 IN ADULT: ICD-10-CM

## 2022-10-04 DIAGNOSIS — E66.09 CLASS 1 OBESITY DUE TO EXCESS CALORIES WITHOUT SERIOUS COMORBIDITY WITH BODY MASS INDEX (BMI) OF 34.0 TO 34.9 IN ADULT: ICD-10-CM

## 2022-10-04 DIAGNOSIS — E74.39 GLUCOSE INTOLERANCE: Primary | ICD-10-CM

## 2022-10-04 DIAGNOSIS — Z71.3 NUTRITIONAL COUNSELING: ICD-10-CM

## 2022-10-04 PROCEDURE — 97803 MED NUTRITION INDIV SUBSEQ: CPT | Mod: 95 | Performed by: DIETITIAN, REGISTERED

## 2022-10-04 NOTE — LETTER
10/4/2022         RE: Zayra Ramirez  [address] [place] [address]        Dear Colleague,    Thank you for referring your patient, Zayra Ramirez, to the [facility] SURGERY CLINIC AND BARIATRICS [facility]ARE [place]. Please see a copy of my visit note below.    Zayra Ramirez is a 52 year old who is being evaluated via a billable video visit.      How would you like to obtain your AVS? MyChart  If the video visit is dropped, the invitation should be resent by: Send to e-mail at: [email or web address]  Will anyone else be joining your video visit? No      Video Start Time: 1:51 PM      Medical  Weight Loss Follow-Up Diet Evaluation  Assessment:  Zayra is presenting today for a follow up weight management nutrition consultation. Pt has had an initial appointment with Dr. Hong.  Weight loss medication: Saxenda.   Pt's weight is 202 lbs   Initial weight: 219 lbs  Weight change: 17 lbs (down)    No flowsheet data found.  BMI: There is no height or weight on file to calculate BMI.  Ideal body weight: 54.7 kg (120 lb 9.5 oz)  Adjusted ideal body weight: 70.4 kg (155 lb 2.5 oz)    Estimated RMR ([place]-St Jeor equation):   1520 kcals x 1.3 (light active) = 1976 kcals (for weight maintenance)     Recommended Protein Intake: 60-80 grams of protein/day  Patient Active Problem List:  Patient Active Problem List   Diagnosis     Lumbago     Facial cellulitis     Class 2 severe obesity with serious comorbidity and body mass index (BMI) of 35.0 to 35.9 in adult, unspecified obesity type (H)     Dental abscess     Hypoxia     Subcutaneous emphysema (H)     Borderline personality disorder (H)     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)     Esophageal stricture     Obstruction of esophagus     HTN (hypertension)     Interstitial lung disease (H)     Moderate persistent asthma without complication     Onychomycosis     Restless leg syndrome     Seasonal allergies     Stress     Respiratory  bronchiolitis interstitial lung disease (H)     Bilateral shoulder pain     Spinal epidural abscess     Lumbar spondylosis     Inflammatory arthritis     Arthralgia of temporomandibular joint     Articular disc disorder of temporomandibular joint     Derangement of temporomandibular joint     Calculus of gallbladder without cholecystitis without obstruction     Displacement of articular disc of temporomandibular joint with reduction     Myofascial pain     Oral lesion     Symptomatic cholelithiasis     Sacro-iliac pain     Degenerative lumbar spinal stenosis     Chronic lumbar radiculopathy     Glucose intolerance     Chronic neck pain     Lumbar radiculopathy, chronic     Cervical radiculopathy     Acute pain of right shoulder     Other osteoporosis without current pathological fracture     Diaphragmatic hernia     Diabetes: No     Progress on goals from last visit: Trying to work on consistency of meals along with adequate protein intake. Patient reports that she continues to work on smaller portion sizes along with limiting starches (I.e. potatoes).  Patient reports that she is being more mindful in regards to sugar consumption.     Dietary Recall:  Breakfast: skipped   Lunch:nuts or meat  Dinner:Chicken or Beef, vegetable, occasional potato  Typical snacks: cheese (trying to wean off)   Beverages: Switch over to Zero Sugar Mt Dew instead of Regular Mt Dew consumption, Water 4 (16 oz) bottles/day  Exercise: Walking around the block with the dogs, working towards daily.  Some PT exercises at home    Nutrition Diagnosis:  Overweight/Obesity (NC 3.3) related to overeating and poor lifestyle habits as evidenced by patient's subjective statements (h/o excessive energy intake, lack of exercise) and BMI of 34.7 kg/m2.     Intervention:  1. Food and/or nutrient delivery: balanced meals, adequate protein   2. Nutrition education: consistency of meals, protein intake   3. Nutrition counseling: exercise  regimen    Monitoring/Evaluation:    Goals:  1. Work on consistency of breakfast daily, focusing on protein first (I.e. hard boiled eggs).   2. Work on establishing a walking routine for exercise.     Patient to follow up in 1 month(s) with DANIEL      Video-Visit Details    Type of service:  Video Visit    Video End Time:2:01 PM    Originating Location (pt. Location): Home    Distant Location (provider location):  [facility] SURGERY CLINIC AND BAR[facility]ATRICS [facility]     Platform used for Video Visit: Rosey Dobbins RD        Again, thank you for allowing me to participate in the care of your patient.        Sincerely,        Saumya Dobbins RD

## 2022-10-04 NOTE — PROGRESS NOTES
Zayra Ramirez is a 52 year old who is being evaluated via a billable video visit.      How would you like to obtain your AVS? MyChart  If the video visit is dropped, the invitation should be resent by: Send to e-mail at: [email or web address]  Will anyone else be joining your video visit? No      Video Start Time: 1:51 PM      Medical  Weight Loss Follow-Up Diet Evaluation  Assessment:  Zayra is presenting today for a follow up weight management nutrition consultation. Pt has had an initial appointment with Dr. Hong.  Weight loss medication: Saxenda.   Pt's weight is 202 lbs   Initial weight: 219 lbs  Weight change: 17 lbs (down)    No flowsheet data found.  BMI: There is no height or weight on file to calculate BMI.  Ideal body weight: 54.7 kg (120 lb 9.5 oz)  Adjusted ideal body weight: 70.4 kg (155 lb 2.5 oz)    Estimated RMR ([place]-St Jeor equation):   1520 kcals x 1.3 (light active) = 1976 kcals (for weight maintenance)     Recommended Protein Intake: 60-80 grams of protein/day  Patient Active Problem List:  Patient Active Problem List   Diagnosis     Lumbago     Facial cellulitis     Class 2 severe obesity with serious comorbidity and body mass index (BMI) of 35.0 to 35.9 in adult, unspecified obesity type (H)     Dental abscess     Hypoxia     Subcutaneous emphysema (H)     Borderline personality disorder (H)     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)     Esophageal stricture     Obstruction of esophagus     HTN (hypertension)     Interstitial lung disease (H)     Moderate persistent asthma without complication     Onychomycosis     Restless leg syndrome     Seasonal allergies     Stress     Respiratory bronchiolitis interstitial lung disease (H)     Bilateral shoulder pain     Spinal epidural abscess     Lumbar spondylosis     Inflammatory arthritis     Arthralgia of temporomandibular joint     Articular disc disorder of temporomandibular joint     Derangement of temporomandibular joint      Calculus of gallbladder without cholecystitis without obstruction     Displacement of articular disc of temporomandibular joint with reduction     Myofascial pain     Oral lesion     Symptomatic cholelithiasis     Sacro-iliac pain     Degenerative lumbar spinal stenosis     Chronic lumbar radiculopathy     Glucose intolerance     Chronic neck pain     Lumbar radiculopathy, chronic     Cervical radiculopathy     Acute pain of right shoulder     Other osteoporosis without current pathological fracture     Diaphragmatic hernia     Diabetes: No     Progress on goals from last visit: Trying to work on consistency of meals along with adequate protein intake. Patient reports that she continues to work on smaller portion sizes along with limiting starches (I.e. potatoes).  Patient reports that she is being more mindful in regards to sugar consumption.     Dietary Recall:  Breakfast: skipped   Lunch:nuts or meat  Dinner:Chicken or Beef, vegetable, occasional potato  Typical snacks: cheese (trying to wean off)   Beverages: Switch over to Zero Sugar Mt Dew instead of Regular Mt Dew consumption, Water 4 (16 oz) bottles/day  Exercise: Walking around the block with the dogs, working towards daily.  Some PT exercises at home    Nutrition Diagnosis:  Overweight/Obesity (NC 3.3) related to overeating and poor lifestyle habits as evidenced by patient's subjective statements (h/o excessive energy intake, lack of exercise) and BMI of 34.7 kg/m2.     Intervention:  1. Food and/or nutrient delivery: balanced meals, adequate protein   2. Nutrition education: consistency of meals, protein intake   3. Nutrition counseling: exercise regimen    Monitoring/Evaluation:    Goals:  1. Work on consistency of breakfast daily, focusing on protein first (I.e. hard boiled eggs).   2. Work on establishing a walking routine for exercise.     Patient to follow up in 1 month(s) with RD      Video-Visit Details    Type of service:  Video Visit    Video  End Time:2:01 PM    Originating Location (pt. Location): Home    Distant Location (provider location):  [facility] SURGERY CLINIC AND BAR[facility]ATRICS [facility]     Platform used for Video Visit: Rosey Dobbins RD

## 2022-10-05 ENCOUNTER — TELEPHONE (OUTPATIENT)
Dept: ORTHOPEDICS | Facility: CLINIC | Age: 52
End: 2022-10-05

## 2022-10-05 ENCOUNTER — TELEPHONE (OUTPATIENT)
Dept: NEUROSURGERY | Facility: CLINIC | Age: 52
End: 2022-10-05

## 2022-10-05 NOTE — TELEPHONE ENCOUNTER
[facility] Call Center    Phone Message    May a detailed message be left on voicemail: yes     Reason for Call: Other: patient needs a call back RE update     Action Taken: Message routed to:  [facility] ([facility] ortho    Travel Screening: Not Applicable     1) her insurance says we never sent over PA / they did not get any PA for her upcoming surgery     2) the Neurosurgery ref was made but then Dr Amelia Sands cancelled it and now she does not know what to do

## 2022-10-05 NOTE — TELEPHONE ENCOUNTER
See phone message from pt with 2 questions.  I called back & spoke to pt & told her since her surgery date is not until Nov 30 , the PA dept has not yet submitted request to Insurance but they will.  Also I see note in epic from Neurosurgery that they left her a message that they canceled preop appt with  but note did not say why.  I asked her what message said & she stated message said she does not need to see  before surgery.      Is assisting  with surgery 11-30-22.    Pt stated she saw  about 1 year ago & she quit smoking as he advised.  I congratulated her.    I told her the Neurosurgery dept. Made a postop appt with  for 12-16-22 for a wound check after surgery so obviously she does not need preop appt. But  is scheduled to assist.  Pt agreed.  Call back prn.  Pt agreed.    Rosy Hunter RN.

## 2022-10-16 ENCOUNTER — HEALTH MAINTENANCE LETTER (OUTPATIENT)
Age: 52
End: 2022-10-16

## 2022-10-16 DIAGNOSIS — K21.9 GASTROESOPHAGEAL REFLUX DISEASE, UNSPECIFIED WHETHER ESOPHAGITIS PRESENT: ICD-10-CM

## 2022-10-18 ENCOUNTER — TELEPHONE (OUTPATIENT)
Dept: SLEEP MEDICINE | Facility: CLINIC | Age: 52
End: 2022-10-18

## 2022-10-18 ENCOUNTER — TELEPHONE (OUTPATIENT)
Dept: RHEUMATOLOGY | Facility: CLINIC | Age: 52
End: 2022-10-18

## 2022-10-18 DIAGNOSIS — G25.81 RESTLESS LEG SYNDROME: ICD-10-CM

## 2022-10-18 DIAGNOSIS — Z87.39 HISTORY OF SERONEGATIVE INFLAMMATORY ARTHRITIS: Primary | ICD-10-CM

## 2022-10-18 RX ORDER — PREDNISONE 5 MG/1
5 TABLET ORAL DAILY
Qty: 42 TABLET | Refills: 1 | Status: SHIPPED | OUTPATIENT
Start: 2022-10-18 | End: 2022-11-07

## 2022-10-18 NOTE — TELEPHONE ENCOUNTER
Left message for patient that Dr. Saenz did call in a prednisone prescription. Asked patient to follow-up in the next couple weeks if your symptoms are not improving.    Will also send MyChart message.    EBONIE ChenN, RN  RN Care Coordinator Rheumatology

## 2022-10-18 NOTE — TELEPHONE ENCOUNTER
"Follow-up call to patient to discuss her symptoms.    -When did the pain/stiffness start?- one week ago, with the  cold weather, each day has gotten a little worse, today is the worst.    -What joints are involved?- both shoulders    -Are any of your joints swollen, red, or warm to the touch?-  Shoulders are warm    -How would you describe your pain/rate your pain? Can hardly lift arms, Today the pain is the worst 7/10, gnawing type of pain    -Do your symptoms improve throughout the day?- worse in the morning, slightly better throughout they day, however today it is not improving    -Is the pain/stiffness impacting your sleep, ADL's/function?- yes    -Any recent illnesses, vaccines, or changes in your medical history?- month ago-received  shingles and flu vaccine     -Is this a usual flare for you? Last time it was just one shoulder, and I was hospitalized.  This time both shoulders.     -Any triggers? Cold weather, \"is it pseudo gout this time, I am not sure\"    -What is your usual treatment for flares? Patient is not sure if this is her RA or pseudo gout    -Are you doing any OTC treatment for your symptoms?   Topical inflammatory cream, 1500mg acetaminophon 3xday  celebrex daily, Flexeril at bedtime.  Discussed with patient that 3000mg is the max dose of acetaminophen she should use daily, patient will decrease to 1000mg 3xday.    -Are you currently taking any prednisone? no    -What are the prescribed medications for your disease? Taking Methotrexate weekly, 10 tablets, just took it yesterday.      -What is your preferred pharmacy if any new medications are ordered? [facility] in [place].    Will route patient's concerns to Dr. Saenz and follow- up with her.    Yandy Orlando, BSN, RN  RN Care Coordinator Rheumatology      "

## 2022-10-18 NOTE — TELEPHONE ENCOUNTER
Reason for Call: Call back and prescription    Detailed comments: Patient called in stating that she has been trying to get a refill of her Pregabalin. Looks like it states it was sent 9/26 for a start date of 10/5/22. Patient states pharmacy does not have the refill order. Please advise on refill and return call to patient.     Phone Number Patient can be reached at: Cell number on file:    Telephone Information:   Mobile [phone]       Best Time: Anytime    Can we leave a detailed message on this number? YES    Call taken on 10/18/2022 at 11:03 AM by Phoebe Arce

## 2022-10-18 NOTE — TELEPHONE ENCOUNTER
Health Call Center    Phone Message    May a detailed message be left on voicemail: yes     Reason for Call: Symptoms or Concerns     If patient has red-flag symptoms, warm transfer to triage line    Current symptom or concern: Flare up in both shoulders; last time this happened she ended up in the hospital with some type of crystals gout showing.  Pain is very simillar to the last flare up.    Pt is wondering if there is something or an injection Pt can get done? Would Dr. Saenz like to see her?    Please contact the Pt back to let her know.    Symptoms have been present for:  1 week(s)    Has patient previously been seen for this? Yes    By : Dr. Saenz    Are there any new or worsening symptoms? Yes: worsening      Action Taken: Message routed to:  [facility] ([facility]): Adult Rheumatology and CS Rheumatology

## 2022-10-19 ENCOUNTER — PRE VISIT (OUTPATIENT)
Dept: UROLOGY | Facility: CLINIC | Age: 52
End: 2022-10-19

## 2022-10-19 RX ORDER — PREGABALIN 150 MG/1
150 CAPSULE ORAL 2 TIMES DAILY
Qty: 60 CAPSULE | Refills: 3 | Status: ON HOLD | OUTPATIENT
Start: 2022-10-19 | End: 2022-12-06

## 2022-10-19 NOTE — TELEPHONE ENCOUNTER
Reason for visit: Cystoscopy and pelvic exam    Relevant information: urinary symptoms with previous cystocele repair    Records/imaging/labs/orders: all records available    Pt called: no need for a call    At Rooming: ask symptoms, collect a urine/dip    Jen Beckwith CMA  10/19/2022  9:42 AM

## 2022-10-19 NOTE — TELEPHONE ENCOUNTER
Pregabalin prescription was sent again. I checked [place]  and her last fill was in August.  Bennett Goltz, PA-C

## 2022-10-20 NOTE — TELEPHONE ENCOUNTER
OMEPRAZOLE DR 40 MG CAPSULE      Last Written Prescription Date:  7/22/21  Last Fill Quantity: 180,   # refills: 3  Last Office Visit : 9/7/22  Future Office visit:  1/10/23    Routing refill request to provider for review/approval because:  Diagnosis-Other osteoporosis without current pathological fracture

## 2022-10-26 NOTE — TELEPHONE ENCOUNTER
DIAGNOSIS: shoulder pain,self,has recent imaging    APPOINTMENT DATE: 11.3.22   NOTES STATUS DETAILS   DISCHARGE REPORT from the ER Internal 7.13.22 [facility]     MEDICATION LIST Internal    MRI Internal 7.13.22 R shoulder   XRAYS (IMAGES & REPORTS) Internal  7.13.22 R shoulder

## 2022-10-27 ENCOUNTER — ANCILLARY PROCEDURE (OUTPATIENT)
Dept: RADIOLOGY | Facility: AMBULATORY SURGERY CENTER | Age: 52
End: 2022-10-27
Attending: PHYSICIAN ASSISTANT
Payer: COMMERCIAL

## 2022-10-27 ENCOUNTER — ALLIED HEALTH/NURSE VISIT (OUTPATIENT)
Dept: UROLOGY | Facility: CLINIC | Age: 52
End: 2022-10-27
Payer: COMMERCIAL

## 2022-10-27 ENCOUNTER — OFFICE VISIT (OUTPATIENT)
Dept: UROLOGY | Facility: CLINIC | Age: 52
End: 2022-10-27
Payer: COMMERCIAL

## 2022-10-27 VITALS
WEIGHT: 207 LBS | BODY MASS INDEX: 35.34 KG/M2 | HEART RATE: 84 BPM | SYSTOLIC BLOOD PRESSURE: 96 MMHG | DIASTOLIC BLOOD PRESSURE: 66 MMHG | HEIGHT: 64 IN

## 2022-10-27 DIAGNOSIS — R35.0 URINARY FREQUENCY: Primary | ICD-10-CM

## 2022-10-27 DIAGNOSIS — Z98.890 HISTORY OF PELVIC SURGERY: ICD-10-CM

## 2022-10-27 DIAGNOSIS — R35.0 URINARY FREQUENCY: ICD-10-CM

## 2022-10-27 DIAGNOSIS — R39.89 URINARY PROBLEM: ICD-10-CM

## 2022-10-27 DIAGNOSIS — R39.11 URINARY HESITANCY: Primary | ICD-10-CM

## 2022-10-27 DIAGNOSIS — N39.8 VOIDING DYSFUNCTION: ICD-10-CM

## 2022-10-27 DIAGNOSIS — M62.89 PELVIC FLOOR DYSFUNCTION: ICD-10-CM

## 2022-10-27 DIAGNOSIS — R39.11 URINARY HESITANCY: ICD-10-CM

## 2022-10-27 PROBLEM — M26.52 LIMITED OPENING OF MANDIBLE: Status: ACTIVE | Noted: 2020-01-14

## 2022-10-27 LAB
ALBUMIN UR-MCNC: NEGATIVE MG/DL
APPEARANCE UR: CLEAR
BILIRUB UR QL STRIP: ABNORMAL
COLOR UR AUTO: YELLOW
GLUCOSE UR STRIP-MCNC: NEGATIVE MG/DL
HGB UR QL STRIP: NEGATIVE
KETONES UR STRIP-MCNC: NEGATIVE MG/DL
LEUKOCYTE ESTERASE UR QL STRIP: NEGATIVE
NITRATE UR QL: NEGATIVE
PH UR STRIP: 7 [PH] (ref 5–8)
SP GR UR STRIP: 1.02 (ref 1–1.03)
UROBILINOGEN UR STRIP-ACNC: 0.2 E.U./DL

## 2022-10-27 PROCEDURE — 51600 INJECTION FOR BLADDER X-RAY: CPT | Performed by: PHYSICIAN ASSISTANT

## 2022-10-27 PROCEDURE — 51797 INTRAABDOMINAL PRESSURE TEST: CPT | Performed by: PHYSICIAN ASSISTANT

## 2022-10-27 PROCEDURE — 51728 CYSTOMETROGRAM W/VP: CPT | Performed by: PHYSICIAN ASSISTANT

## 2022-10-27 PROCEDURE — 51784 ANAL/URINARY MUSCLE STUDY: CPT | Performed by: PHYSICIAN ASSISTANT

## 2022-10-27 PROCEDURE — 51741 ELECTRO-UROFLOWMETRY FIRST: CPT | Performed by: PHYSICIAN ASSISTANT

## 2022-10-27 PROCEDURE — 52000 CYSTOURETHROSCOPY: CPT | Performed by: UROLOGY

## 2022-10-27 PROCEDURE — 99212 OFFICE O/P EST SF 10 MIN: CPT | Mod: 25 | Performed by: UROLOGY

## 2022-10-27 PROCEDURE — 81003 URINALYSIS AUTO W/O SCOPE: CPT | Performed by: PHYSICIAN ASSISTANT

## 2022-10-27 PROCEDURE — 74455 X-RAY URETHRA/BLADDER: CPT | Performed by: PHYSICIAN ASSISTANT

## 2022-10-27 PROCEDURE — 81003 URINALYSIS AUTO W/O SCOPE: CPT | Performed by: PATHOLOGY

## 2022-10-27 RX ORDER — IOPAMIDOL 510 MG/ML
200 INJECTION, SOLUTION INTRAVASCULAR ONCE
Status: COMPLETED | OUTPATIENT
Start: 2022-10-27 | End: 2022-10-27

## 2022-10-27 RX ORDER — ALBUTEROL SULFATE 0.83 MG/ML
SOLUTION RESPIRATORY (INHALATION)
COMMUNITY
Start: 2022-10-06 | End: 2023-02-03

## 2022-10-27 RX ORDER — CEPHALEXIN 500 MG/1
500 CAPSULE ORAL ONCE
Status: COMPLETED | OUTPATIENT
Start: 2022-10-27 | End: 2022-10-27

## 2022-10-27 RX ORDER — ALBUTEROL SULFATE 0.83 MG/ML
2.5 SOLUTION RESPIRATORY (INHALATION)
COMMUNITY
Start: 2022-09-07 | End: 2022-11-07

## 2022-10-27 RX ADMIN — CEPHALEXIN 500 MG: 500 CAPSULE ORAL at 07:31

## 2022-10-27 RX ADMIN — IOPAMIDOL 200 ML: 510 INJECTION, SOLUTION INTRAVASCULAR at 07:31

## 2022-10-27 ASSESSMENT — PAIN SCALES - GENERAL: PAINLEVEL: NO PAIN (0)

## 2022-10-27 NOTE — PATIENT INSTRUCTIONS
"Good luck with your back surgery    Websites with free information:    [employer] patient website: www.voicesforpfd.org    Total Control Program: www.totalcontrolprogram.com    [facility] [place]  [address]  [place]   [phone]    It was a pleasure meeting with you today.  Thank you for allowing me and my team the privilege of caring for you today.  YOU are the reason we are here, and I truly hope we provided you with the excellent service you deserve.  Please let us know if there is anything else we can do for you so that we can be sure you are leaving completely satisfied with your care experience.    AFTER YOUR CYSTOSCOPY        You have just completed a cystoscopy, or \"cysto\", which allowed your physician to learn more about your bladder (or to remove a stent placed after surgery). We suggest that you continue to avoid caffeine, fruit juice, and alcohol for the next 24 hours, however, you are encouraged to return to your normal activities.         A few things that are considered normal after your cystoscopy:     * Small amount of bleeding (or spotting) that clears within the next 24 hours     * Slight burning sensation with urination     * Sensation to of needing to avoid more frequently     * The feeling of \"air\" in your urine     * Mild discomfort that is relieved with Tylenol        Please contact our office promptly if you:     * Develop a fever above 101 degrees     * Are unable to urinate     * Develop bright red blood that does not stop     * Severe pain or swelling         Please contact our office with any concerns or questions @[phone]  "

## 2022-10-27 NOTE — PROGRESS NOTES
PREPROCEDURE DIAGNOSES:    1. Urinary urgency  2. Urinary hesitancy  3. Crede voiding  4. S/p hysterectomy and cystocele/rectocel repair in 1997    POSTPROCEDURE DIAGNOSES:  -Maximum cystometric capacity 850 mL with normal to slightly delayed filling sensations.  -Good bladder compliance without detrusor overactivity or urodynamic stress incontinence.  -No appreciable detrusor contraction during prolonged attempts to void with some increased EMG activity. Ultimately, she is unable to void any volume in the UDS suite despite multiple position changes, running water, staff leaving the room, resuming filling, and removal of the Pves catheter. As a result, all catheters are removed and she is brought to private restroom where she is able to void for complex uroflowmetry with the following data: voided volume 800 mL, Qmax 29.1 ml/s, Qmean 9.7 ml/s, prolonged and fluctuating flow curve, and final PVR 50 mL.   -Unable to definitively evaluate for bladder outlet obstruction given inability to void any volume with Pves catheter in place.  -Fluoroscopy reveals a mildly trabeculated bladder wall without diverticuli or VUR. The bladder neck is closed during filling and does not open appreciably during attempts to void under fluoroscopy.     PROCEDURE:    1. Sterile urethral catheterization for measurement of residual urine volume.  2. Complex filling cystometrogram with measurement of bladder and rectal pressures.  3. Complex voiding cystometrogram with measurement of bladder and rectal pressures.  4. Electromyography of the pelvic floor during urodynamics.  5. Fluoroscopic imaging of the bladder during urodynamics, at least 3 views.    6. Interpretation of urodynamics and flouroscopic imaging.      INDICATIONS FOR PROCEDURE:  Ms. Zayra Ramirez is a pleasant 52 year old female with a history of hysterectomy with cystocele/rectocele repair in 1997, now with symptoms of urinary urgency, hesitancy, and Crede voiding. Baseline  video urodynamic assessment is requested today by Dr. Tran to better characterize Ms. Zayra Ramirez's voiding dysfunction.      VOIDING DIARY:  Voids 8 time during the day, once overnight.  No episodes of incontinence.  Total volume intake: 1920 mL (did not record what was drank)  Total volume output: 1865 mL, average voided volume: 207 mL, largest volume voided: 590 mL    DESCRIPTION OF PROCEDURE:  Risks, benefits, and alternatives to urodynamics were discussed with the patient and she wished to proceed.  Urodynamics are planned to better assess the primary etiology for Ms. Ramirez's urologic dysfunction.  The patient does not currently take anticholinergic or beta 3 agonist medications for her bladder.  After informed consent was obtained, the patient was taken to the procedure room where uroflowmetry was performed. Findings below.     PRE-STUDY UROFLOWMETRY:  Not performed as patient had no urge to void.  Residual by catheter: 10 mL.  Pretest urine dipstick was negative for leukocytes and nitrites.    Next a 7F double-lumen urodynamics catheter was inserted into the bladder under sterile technique via urethra.  A 7F abdominal manometry catheter was placed in the rectum.  EMG pads were placed on both sides of the anal verge.  The bladder was filled with 200 mL of Isovue-250 at 50 mL/minute and serial pressures were recorded.  With coughing there was an appropriate rise in vesical and abdominal pressures with no change in detrusor pressure, confirming good study catheter placement.    DURING THE FILLING PHASE:  First sensation: 280 mL.  First Desire: 397 mL.  Strong Desire: 491 mL.  Maximum Capacity: 850 mL.    Uninhibited detrusor contractions: none.  Compliance: good. PDet essentially 0 cm H2O throughout filling.   Continence: no DOI or ANGELITA.  EMG: concordant during filling.    DURING THE VOIDING PHASE:  Maximum detrusor contraction with void: no appreciable detrusor contraction.  Patient unable to void any  volume in the UDS suite despite multiple position changes, running water, staff leaving the room, resuming filling, and removal of the Pves catheter.   As a result, all catheters were removed and she was brought to private restroom where she proceeded to void on the uroflow machine with the following data:  Voided volume: 799 mL.  Maximum flow rate: 29.1 mL/sec.  Average flow rate: 9.7 mL/sec.  Postvoid Residual: 50 mL.  EMG activity: increased during attempts to void in the UDS suite.  Character of voiding curve: prolonged, fluctuating.  BOOI: unable to calculate  [obstructed (CASON index [BOOI] ? 40); equivocal (no definite   obstruction; BOOI 20-40); and no obstruction (BOOI ? 20)]    FLUOROSCOPIC IMAGING OF THE BLADDER DURING URODYNAMICS:  Please note, image numbers on UDS tracings correlate with iSite series numbers on PACS images. Fluoroscopy during today's procedure demonstrated a mildly trabeculated bladder wall without diverticulae or cellules.  No vesicoureteral reflux was observed.  The bladder neck was closed during filling and did not open during attempts to void.     ASSESSMENT/PLAN:  Ms. Zayra Ramirez is a pleasant 52 year old female with voiding dysfunction who demonstrated the following findings today on urodynamic evaluation:    -Maximum cystometric capacity 850 mL with normal to slightly delayed filling sensations.  -Good bladder compliance without detrusor overactivity or urodynamic stress incontinence.  -No appreciable detrusor contraction during prolonged attempts to void with some increased EMG activity. Ultimately, she is unable to void any volume in the UDS suite despite multiple position changes, running water, staff leaving the room, resuming filling, and removal of the Pves catheter. As a result, all catheters are removed and she is brought to private restroom where she is able to void for complex uroflowmetry with the following data: voided volume 800 mL, Qmax 29.1 ml/s, Qmean 9.7 ml/s,  prolonged and fluctuating flow curve, and final PVR 50 mL.   -Unable to definitively evaluate for bladder outlet obstruction given inability to void any volume with Pves catheter in place.  -Fluoroscopy reveals a mildly trabeculated bladder wall without diverticuli or VUR. The bladder neck is closed during filling and does not open appreciably during attempts to void under fluoroscopy.     The patient will follow up as scheduled with Dr. Saucedo to further discuss today's study results and make plans for how best to proceed.      - A single Bactrim antibiotic was provided for UTI prophylaxis following completion of today's study per department protocol.  The risk of UTI with VUDS is low at ~2.5-3%.      Thank you for allowing me to participate in the care of Ms. Zayra Ramirez and please don't hesitate to contact me with any questions or concerns.      Kimi Paredes PA-C  Urology Physician Assistant

## 2022-10-27 NOTE — PROGRESS NOTES
October 27, 2022    Return visit    Patient returns today for follow up. She denies any changes in her health since last visit.    LMP  (LMP Unknown)   She is comfortable, in no distress, non-labored breathing.  Abdomen is soft, non-tender, non-distended.  Normal external female genitalia.  Negative CST.  Pelvic exam is remarkable for TVL 6 with stage II anterior wall prolapse to -1 on supine strain    Cystoscopy Note: After informed consent was obtained patient was prepped and draped in the standard fashion.  The flexible cystoscope was inserted into a normal appearing urethral meatus.  The urothelium was carefully examined and there were no tumors, masses, stones, foreign bodies, or other urothelial abnormalities noted.  Bilateral ureteral orifices were noted in the normal orthotopic position and both effluxed clear urine.  The cystoscope was retroflexed and the bladder neck was unremarkable.  The urethra was carefully examined upon removing the cystoscope and was unremarkable.  Patient tolerated the procedure without complications noted.      VUDS reviewed.  On my interpretation she has minimal detrusor function and when she goes to urinate she has high EMG activity suggestive of pelvic floor dysfunction    A/P: 52 year old F with history of prior pelvic surgery with urinary frequency, hesitancy, voiding dysfunction and pelvic floor dysfunction    Recommend pelvic floor therapy but she is planning on back surgery in the near future    RTC 6 months to see Marni, sooner if needed    10 minutes were spent today on the date of the encounter in reviewing the EMR, direct patient care, coordination of care and documentation in addition to the cystoscopy procedure    Elizabeth Saucedo MD MPH  (she/her/hers)   of Urology  [facility]      CC  Patient Care Team:  Neena Tam APRN CNP as PCP - General (Internal Medicine)  Care, [facility] ([facility] ([facility]),  ([place]))  Sumaya Bustillo PA as Physician Assistant (Physician Assistant)  Julian Mccullough MD as MD (Gastroenterology)  Panchito Saenz MD as Assigned Rheumatology Provider  Neena Tam APRN CNP as Assigned PCP  Leodan Gan MD as Resident (Student in organized health care education/training program)  Morro Mijares MD as Assigned Surgical Provider  Trent Rahman MD as MD (Gastroenterology)  Mick Avalos Jr., MD as Resident (Internal Medicine)  Mai Sotomayor PA-C as Physician Assistant (Pediatric Critical Care Medicine)  Power Ojeda MD as MD (Physical Medicine and Rehabilitation)  Philip Wilhelm MD as Assigned Musculoskeletal Provider  Mikayla Morin, PhD as Assigned Behavioral Health Provider  Trent Rahman MD as Assigned Gastroenterology Provider  Jamaal Dorsye MD as MD (OB/Gyn)  Naya Melendez, PharmD as MTM Pharamcist (Pharmacist)  Yeimy Guerra DO as Assigned Neuroscience Provider  Dior Arroyo PA-C as Assigned Cancer Care Provider  Naya Melendez, PharmD as Assigned MTM Pharmacist  SELF, REFERRED

## 2022-10-27 NOTE — NURSING NOTE
Chief Complaint   Patient presents with     Cystoscopy       not currently breastfeeding. There is no height or weight on file to calculate BMI.    Patient Active Problem List   Diagnosis     Lumbago     Facial cellulitis     Class 2 severe obesity with serious comorbidity and body mass index (BMI) of 35.0 to 35.9 in adult, unspecified obesity type (H)     Dental abscess     Hypoxia     Subcutaneous emphysema (H)     Borderline personality disorder (H)     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)     Esophageal stricture     Obstruction of esophagus     HTN (hypertension)     Interstitial lung disease (H)     Moderate persistent asthma without complication     Onychomycosis     Restless leg syndrome     Seasonal allergies     Stress     Respiratory bronchiolitis interstitial lung disease (H)     Bilateral shoulder pain     Spinal epidural abscess     Lumbar spondylosis     Inflammatory arthritis     Arthralgia of temporomandibular joint     Articular disc disorder of temporomandibular joint     Derangement of temporomandibular joint     Calculus of gallbladder without cholecystitis without obstruction     Displacement of articular disc of temporomandibular joint with reduction     Myofascial pain     Oral lesion     Symptomatic cholelithiasis     Sacro-iliac pain     Degenerative lumbar spinal stenosis     Chronic lumbar radiculopathy     Glucose intolerance     Chronic neck pain     Lumbar radiculopathy, chronic     Cervical radiculopathy     Acute pain of right shoulder     Other osteoporosis without current pathological fracture     Diaphragmatic hernia     Bipolar 2 disorder (H)     Choking sensation     Gastro-esophageal reflux disease with esophagitis     Limited opening of mandible       Allergies   Allergen Reactions     Bee Venom Anaphylaxis     Doxycycline Anaphylaxis     Patient thinks it may have been just nausea and vomiting, however unable to confirm  Other reaction(s): throat closes      Erythromycin Anaphylaxis and Shortness Of Breath     Other reaction(s): Vomiting  Other reaction(s): throat closes     Hydrocodone-Acetaminophen Itching       Current Outpatient Medications   Medication Sig Dispense Refill     albuterol (PROAIR HFA/PROVENTIL HFA/VENTOLIN HFA) 108 (90 Base) MCG/ACT inhaler Inhale 2 puffs into the lungs every 6 hours as needed for shortness of breath / dyspnea or wheezing Ventolin please 18 g 2     albuterol (PROVENTIL) (2.5 MG/3ML) 0.083% neb solution Inhale 2.5 mg into the lungs       albuterol (PROVENTIL) (2.5 MG/3ML) 0.083% neb solution INHALE 3 ML VIA A NEBULIZER 4 TIMES DAILY IF NEEDED.       ARIPiprazole (ABILIFY) 5 MG tablet Take 5 mg by mouth daily       Black Pepper-Turmeric (TURMERIC CURCUMIN) 5-1000 MG CAPS Take 1 capsule by mouth daily       busPIRone HCl (BUSPAR) 30 MG tablet Take 30 mg by mouth 2 times daily        carboxymethylcellulose (CARBOXYMETHYLCELLULOSE SODIUM) 0.5 % SOLN ophthalmic solution Place 1 drop into both eyes 3 times daily as needed for dry eyes 15 mL 11     celecoxib (CELEBREX) 200 MG capsule Take 1 capsule (200 mg) by mouth every morning 30 capsule 3     cyclobenzaprine (FLEXERIL) 10 MG tablet Take 1 tablet (10 mg) by mouth At Bedtime 90 tablet 1     diazepam (VALIUM) 5 MG tablet Take 2 tablets (10 mg) by mouth once as needed for anxiety (to be taken ~ 20 mins prior to MRI) 2 tablet 0     DULoxetine (CYMBALTA) 60 MG capsule Take 120 mg by mouth At Bedtime        EPINEPHrine (EPIPEN/ADRENACLICK/OR ANY BX GENERIC EQUIV) 0.3 MG/0.3ML injection 2-pack        folic acid (FOLVITE) 1 MG tablet Take 1 tablet (1 mg) by mouth daily 90 tablet 3     insulin pen needle (32G X 4 MM) 32G X 4 MM miscellaneous Use 1 NEEDLE WEEKLY 12 each 3     insulin pen needle (32G X 4 MM) 32G X 4 MM miscellaneous Use 1 NEEDLE daily to use with Saxenda 100 each 3     insulin pen needle (32G X 6 MM) 32G X 6 MM miscellaneous Use daily pen needles daily or as directed. 100 each 2      liraglutide - Weight Management (SAXENDA) 18 MG/3ML pen Inject 3 mg Subcutaneous daily 15 mL 0     lisinopril-hydrochlorothiazide (ZESTORETIC) 20-25 MG tablet Take 1 tablet by mouth daily 90 tablet 3     magnesium oxide (MAG-OX) 400 MG tablet Take 400 mg by mouth daily       methotrexate sodium 2.5 MG TABS Take 10 tablets (25 mg) by mouth once a week  120 tablet 2     montelukast (SINGULAIR) 10 MG tablet Take 10 mg by mouth At Bedtime       omeprazole (PRILOSEC) 40 MG DR capsule Take 1 capsule (40 mg) by mouth daily (Patient taking differently: Take 40 mg by mouth every evening) 180 capsule 3     OXYGEN-HELIUM IN 3L @ night    Oxygen only not helium       potassium 99 MG TABS Take 1 tablet by mouth daily       predniSONE (DELTASONE) 5 MG tablet Take 1 tablet (5 mg) by mouth daily Take 4 tablets by mouth daily for 1 week, then take 3 tablets by mouth daily for 1 week. 42 tablet 1     pregabalin (LYRICA) 150 MG capsule Take 1 capsule (150 mg) by mouth 2 times daily 60 capsule 3     Respiratory Therapy Supplies ([facility] CPAP FILTER) MISC        rOPINIRole (REQUIP) 0.5 MG tablet Take 1 tablet (0.5 mg) by mouth At Bedtime Take 1 tab by mouth at bedtime. 90 tablet 1     vitamin D3 (CHOLECALCIFEROL) 250 mcg (63942 units) capsule Take 1 capsule by mouth daily       zinc sulfate (ZINCATE) 220 (50 Zn) MG capsule Take 220 mg by mouth daily (with lunch)          Social History     Tobacco Use     Smoking status: Former     Packs/day: 1.00     Years: 30.00     Pack years: 30.00     Types: Cigarettes     Start date: 1996     Quit date: 2021     Years since quittin.9     Smokeless tobacco: Never   Vaping Use     Vaping Use: Former   Substance Use Topics     Alcohol use: No     Drug use: Yes     Types: Marijuana     Comment: very rarely        Invasive Procedure Safety Checklist:    Procedure: Cystoscopy    Action: Complete sections and checkboxes as appropriate.    Pre-procedure:  1. Patient ID  Verified with 2 identifiers (Chelita and  or MRN) : YES    2. Procedure and site verified with patient/designee (when able) : YES    3. Accurate consent documentation in medical record : YES    4. H&P (or appropriate assessment) documented in medical record : N/A  H&P must be up to 30 days prior to procedure an updated within 24 hours of                 Procedure as applicable.     5. Relevant diagnostic and radiology test results appropriately labeled and displayed as applicable : YES    6. Blood products, implants, devices, and/or special equipment available for the procedure as applicable : YES    7. Procedure site(s) marked with provider initials [Exclusions: none] : NO    8. Marking not required. Reason : Yes  Procedure does not require site marking    Time Out:     Time-Out performed immediately prior to starting procedure, including verbal and active participation of all team members addressing: YES    1. Correct patient identity.  2. Confirmed that the correct side and site are marked.  3. An accurate procedure to be done.  4. Agreement on the procedure to be done.  5. Correct patient position.  6. Relevant images and results are properly labeled and appropriately displayed.  7. The need to administer antibiotics or fluids for irrigation purposes during the procedure as applicable.  8. Safety precautions based on patient history or medication use.    During Procedure: Verification of correct person, site, and procedure occurs any time the responsibility for care of the patient is transferred to another member of the care team.      Patito Alvarez  10/27/2022  9:17 AM

## 2022-10-27 NOTE — NURSING NOTE
"  Chief Complaint   Patient presents with     Urodynamics Study     Urinary frequency/difficult voiding       Blood pressure 96/66, pulse 84, height 1.626 m (5' 4\"), weight 93.9 kg (207 lb), not currently breastfeeding. Body mass index is 35.53 kg/m .    Patient Active Problem List   Diagnosis     Lumbago     Facial cellulitis     Class 2 severe obesity with serious comorbidity and body mass index (BMI) of 35.0 to 35.9 in adult, unspecified obesity type (H)     Dental abscess     Hypoxia     Subcutaneous emphysema (H)     Borderline personality disorder (H)     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)     Esophageal stricture     Obstruction of esophagus     HTN (hypertension)     Interstitial lung disease (H)     Moderate persistent asthma without complication     Onychomycosis     Restless leg syndrome     Seasonal allergies     Stress     Respiratory bronchiolitis interstitial lung disease (H)     Bilateral shoulder pain     Spinal epidural abscess     Lumbar spondylosis     Inflammatory arthritis     Arthralgia of temporomandibular joint     Articular disc disorder of temporomandibular joint     Derangement of temporomandibular joint     Calculus of gallbladder without cholecystitis without obstruction     Displacement of articular disc of temporomandibular joint with reduction     Myofascial pain     Oral lesion     Symptomatic cholelithiasis     Sacro-iliac pain     Degenerative lumbar spinal stenosis     Chronic lumbar radiculopathy     Glucose intolerance     Chronic neck pain     Lumbar radiculopathy, chronic     Cervical radiculopathy     Acute pain of right shoulder     Other osteoporosis without current pathological fracture     Diaphragmatic hernia     Bipolar 2 disorder (H)     Choking sensation     Gastro-esophageal reflux disease with esophagitis     Limited opening of mandible       Allergies   Allergen Reactions     Bee Venom Anaphylaxis     Doxycycline Anaphylaxis     Patient thinks it may " have been just nausea and vomiting, however unable to confirm  Other reaction(s): throat closes     Erythromycin Anaphylaxis and Shortness Of Breath     Other reaction(s): Vomiting  Other reaction(s): throat closes     Hydrocodone-Acetaminophen Itching       Current Outpatient Medications   Medication Sig Dispense Refill     albuterol (PROAIR HFA/PROVENTIL HFA/VENTOLIN HFA) 108 (90 Base) MCG/ACT inhaler Inhale 2 puffs into the lungs every 6 hours as needed for shortness of breath / dyspnea or wheezing Ventolin please 18 g 2     albuterol (PROVENTIL) (2.5 MG/3ML) 0.083% neb solution Inhale 2.5 mg into the lungs       albuterol (PROVENTIL) (2.5 MG/3ML) 0.083% neb solution INHALE 3 ML VIA A NEBULIZER 4 TIMES DAILY IF NEEDED.       ARIPiprazole (ABILIFY) 5 MG tablet Take 5 mg by mouth daily       Black Pepper-Turmeric (TURMERIC CURCUMIN) 5-1000 MG CAPS Take 1 capsule by mouth daily       busPIRone HCl (BUSPAR) 30 MG tablet Take 30 mg by mouth 2 times daily        carboxymethylcellulose (CARBOXYMETHYLCELLULOSE SODIUM) 0.5 % SOLN ophthalmic solution Place 1 drop into both eyes 3 times daily as needed for dry eyes 15 mL 11     celecoxib (CELEBREX) 200 MG capsule Take 1 capsule (200 mg) by mouth every morning 30 capsule 3     cyclobenzaprine (FLEXERIL) 10 MG tablet Take 1 tablet (10 mg) by mouth At Bedtime 90 tablet 1     diazepam (VALIUM) 5 MG tablet Take 2 tablets (10 mg) by mouth once as needed for anxiety (to be taken ~ 20 mins prior to MRI) 2 tablet 0     DULoxetine (CYMBALTA) 60 MG capsule Take 120 mg by mouth At Bedtime        EPINEPHrine (EPIPEN/ADRENACLICK/OR ANY BX GENERIC EQUIV) 0.3 MG/0.3ML injection 2-pack        folic acid (FOLVITE) 1 MG tablet Take 1 tablet (1 mg) by mouth daily 90 tablet 3     insulin pen needle (32G X 4 MM) 32G X 4 MM miscellaneous Use 1 NEEDLE WEEKLY 12 each 3     insulin pen needle (32G X 4 MM) 32G X 4 MM miscellaneous Use 1 NEEDLE daily to use with Saxenda 100 each 3     insulin pen  needle (32G X 6 MM) 32G X 6 MM miscellaneous Use daily pen needles daily or as directed. 100 each 2     liraglutide - Weight Management (SAXENDA) 18 MG/3ML pen Inject 3 mg Subcutaneous daily 15 mL 0     lisinopril-hydrochlorothiazide (ZESTORETIC) 20-25 MG tablet Take 1 tablet by mouth daily 90 tablet 3     magnesium oxide (MAG-OX) 400 MG tablet Take 400 mg by mouth daily       methotrexate sodium 2.5 MG TABS Take 10 tablets (25 mg) by mouth once a week  120 tablet 2     montelukast (SINGULAIR) 10 MG tablet Take 10 mg by mouth At Bedtime       omeprazole (PRILOSEC) 40 MG DR capsule Take 1 capsule (40 mg) by mouth daily (Patient taking differently: Take 40 mg by mouth every evening) 180 capsule 3     OXYGEN-HELIUM IN 3L @ night    Oxygen only not helium       potassium 99 MG TABS Take 1 tablet by mouth daily       predniSONE (DELTASONE) 5 MG tablet Take 1 tablet (5 mg) by mouth daily Take 4 tablets by mouth daily for 1 week, then take 3 tablets by mouth daily for 1 week. 42 tablet 1     pregabalin (LYRICA) 150 MG capsule Take 1 capsule (150 mg) by mouth 2 times daily 60 capsule 3     Respiratory Therapy Supplies (Critical access hospital CPAP FILTER) MISC        rOPINIRole (REQUIP) 0.5 MG tablet Take 1 tablet (0.5 mg) by mouth At Bedtime Take 1 tab by mouth at bedtime. 90 tablet 1     vitamin D3 (CHOLECALCIFEROL) 250 mcg (63204 units) capsule Take 1 capsule by mouth daily       zinc sulfate (ZINCATE) 220 (50 Zn) MG capsule Take 220 mg by mouth daily (with lunch)          Social History     Tobacco Use     Smoking status: Former     Packs/day: 1.00     Years: 30.00     Pack years: 30.00     Types: Cigarettes     Start date: 1996     Quit date: 2021     Years since quittin.9     Smokeless tobacco: Never   Vaping Use     Vaping Use: Former   Substance Use Topics     Alcohol use: No     Drug use: Yes     Types: Marijuana     Comment: very rarely        Invasive Procedure Safety Checklist:    Procedure:  Urodynamics    Action: Complete sections and checkboxes as appropriate.  Pre-procedure:  1. Patient ID Verified with 2 identifiers (Chelita and  or MRN) : YES    2. Procedure and site verified with patient/designee (when able) : YES    3. Accurate consent documentation in medical record : YES    4. H&P (or appropriate assessment) documented in medical record : N/A  H&P must be up to 30 days prior to procedure an updated within 24 hours of Procedure as applicable.     5. Relevant diagnostic and radiology test results appropriately labeled and displayed as applicable : YES    6. Blood products, implants, devices, and/or special equipment available for the procedure as applicable : YES    7. Procedure site(s) marked with provider initials [Exclusions: none] : NO    8. Marking not required. Reason : Yes  Procedure does not require site marking    Time Out:     Time-Out performed immediately prior to starting procedure, including verbal and active participation of all team members addressing: YES    1. Correct patient identity.  2. Confirmed that the correct side and site are marked.  3. An accurate procedure to be done.  4. Agreement on the procedure to be done.  5. Correct patient position.  6. Relevant images and results are properly labeled and appropriately displayed.  7. The need to administer antibiotics or fluids for irrigation purposes during the procedure as applicable.  8. Safety precautions based on patient history or medication use.    During Procedure: Verification of correct person, site, and procedure occurs any time the responsibility for care of the patient is transferred to another member of the care team.    The following medication was given:     MEDICATION:  Keflex  ROUTE: PO  SITE: Orally  DOSE: 500 mg  LOT #: [id number]  : Catapult  EXPIRATION DATE: 2023  NDC#: [id number]   Was there drug waste? No      The following medication was given:     MEDICATION:   Isovue-250  ROUTE: Provider Administered  SITE: Provider Administered via catheter  DOSE: 200mL  LOT #: [id number]  : Janina  EXPIRATION DATE: 5/31/2025  NDC#: 5599-9464-28   Was there drug waste? No        Yandy Barnett CMA  10/27/2022  7:05 AM

## 2022-10-27 NOTE — LETTER
10/27/2022       RE: Zayra Ramirez  [address] [place] [address]     Dear Colleague,    Thank you for referring your patient, Zayra Ramirez, to the [facility] [place] at [facility]. Please see a copy of my visit note below.    October 27, 2022    Return visit    Patient returns today for follow up. She denies any changes in her health since last visit.    LMP  (LMP Unknown)   She is comfortable, in no distress, non-labored breathing.  Abdomen is soft, non-tender, non-distended.  Normal external female genitalia.  Negative CST.  Pelvic exam is remarkable for TVL 6 with stage II anterior wall prolapse to -1 on supine strain    Cystoscopy Note: After informed consent was obtained patient was prepped and draped in the standard fashion.  The flexible cystoscope was inserted into a normal appearing urethral meatus.  The urothelium was carefully examined and there were no tumors, masses, stones, foreign bodies, or other urothelial abnormalities noted.  Bilateral ureteral orifices were noted in the normal orthotopic position and both effluxed clear urine.  The cystoscope was retroflexed and the bladder neck was unremarkable.  The urethra was carefully examined upon removing the cystoscope and was unremarkable.  Patient tolerated the procedure without complications noted.      VUDS reviewed.  On my interpretation she has minimal detrusor function and when she goes to urinate she has high EMG activity suggestive of pelvic floor dysfunction    A/P: 52 year old F with history of prior pelvic surgery with urinary frequency, hesitancy, voiding dysfunction and pelvic floor dysfunction    Recommend pelvic floor therapy but she is planning on back surgery in the near future    RTC 6 months to see Marni, sooner if needed    10 minutes were spent today on the date of the encounter in reviewing the EMR, direct patient care, coordination of care and  documentation in addition to the cystoscopy procedure    Elizabeth Saucedo MD MPH  (she/her/hers)   of Urology  [facility]      CC  Patient Care Team:  Neena Tam APRN CNP as PCP - General (Internal Medicine)  Care, [facility] [facility] ([facility] ([place]  Sumaya Bustillo PA as Physician Assistant (Physician Assistant)  Julian Mccullough MD as MD (Gastroenterology)  Panchito Saenz MD as Assigned Rheumatology Provider  Neena Tam APRN CNP as Assigned PCP  Leodan Gan MD as Resident (Student in organized health care education/training program)  Morro Mijares MD as Assigned Surgical Provider  Trent Rahman MD as MD (Gastroenterology)  Mick Avalos Jr., MD as Resident (Internal Medicine)  Mai Sotomayor PA-C as Physician Assistant (Pediatric Critical Care Medicine)  Poewr Ojeda MD as MD (Physical Medicine and Rehabilitation)  Philip Wilhelm MD as Assigned Musculoskeletal Provider  Mikayla Morin, PhD as Assigned Behavioral Health Provider  Trent Rahman MD as Assigned Gastroenterology Provider  Jamaal Dorsey MD as MD (OB/Gyn)  Naya Melendez, PharmD as MTM Pharamcist (Pharmacist)  Yeimy Guerra DO as Assigned Neuroscience Provider  Dior Arroyo PA-C as Assigned Cancer Care Provider  Naya Melendez, PharmD as Assigned MTM Pharmacist  SELF, REFERRED

## 2022-10-28 ENCOUNTER — TELEPHONE (OUTPATIENT)
Dept: ORTHOPEDICS | Facility: CLINIC | Age: 52
End: 2022-10-28

## 2022-10-28 NOTE — TELEPHONE ENCOUNTER
Spoke with patient about scheduling an appointment for Carpel Tunnel symptoms.  She said she is not experiencing any symptoms and wishes not to schedule at this time.

## 2022-10-30 DIAGNOSIS — J30.2 SEASONAL ALLERGIC RHINITIS, UNSPECIFIED TRIGGER: ICD-10-CM

## 2022-10-30 DIAGNOSIS — E66.01 CLASS 2 SEVERE OBESITY WITH SERIOUS COMORBIDITY AND BODY MASS INDEX (BMI) OF 35.0 TO 35.9 IN ADULT, UNSPECIFIED OBESITY TYPE (H): ICD-10-CM

## 2022-10-30 DIAGNOSIS — E66.812 CLASS 2 SEVERE OBESITY WITH SERIOUS COMORBIDITY AND BODY MASS INDEX (BMI) OF 35.0 TO 35.9 IN ADULT, UNSPECIFIED OBESITY TYPE (H): ICD-10-CM

## 2022-10-30 DIAGNOSIS — J84.115 RESPIRATORY BRONCHIOLITIS INTERSTITIAL LUNG DISEASE (H): ICD-10-CM

## 2022-10-31 RX ORDER — ALBUTEROL SULFATE 90 UG/1
AEROSOL, METERED RESPIRATORY (INHALATION)
Refills: 2 | OUTPATIENT
Start: 2022-10-31

## 2022-10-31 NOTE — TELEPHONE ENCOUNTER
"Routing refill request to provider for review/approval because:  Labs out of range:  Cr    Last Written Prescription Date: 9/6/22  Last Fill Quantity: 15mL, # refills: 0  Last office visit provider: ANT 9/6/22    Requested Prescriptions   Pending Prescriptions Disp Refills     SAXENDA 18 MG/3ML pen [Pharmacy Med Name: SAXENDA 18 MG/3 ML PEN]       Sig: INJECT 3 MG SUBCUTANEOUS DAILY       GLP-1 Agonists Protocol Failed - 10/30/2022  3:05 PM        Failed - Order for Saxenda with diagnosis of diabetes        Failed - Normal serum creatinine on file in past 12 months     Recent Labs   Lab Test 09/09/22  0944   CR 0.97*       Ok to refill medication if creatinine is low          Passed - HgbA1C in past 3 or 6 months     If HgbA1C is 8 or greater, it needs to be on file within the past 3 months.  If less than 8, must be on file within the past 6 months.     Recent Labs   Lab Test 07/07/22  0846   A1C 5.3             Passed - Medication is active on med list        Passed - Patient is age 18 or older        Passed - No active pregnancy on record        Passed - No positive pregnancy test in past 12 months        Passed - Recent (6 mo) or future (30 days) visit within the authorizing provider's specialty     Patient had office visit in the last 6 months or has a visit in the next 30 days with authorizing provider.  See \"Patient Info\" tab in inbasket, or \"Choose Columns\" in Meds & Orders section of the refill encounter.                 Marlon Mahoney RN 10/31/22 3:01 PM    "

## 2022-11-01 DIAGNOSIS — K21.9 GASTROESOPHAGEAL REFLUX DISEASE, UNSPECIFIED WHETHER ESOPHAGITIS PRESENT: ICD-10-CM

## 2022-11-01 RX ORDER — OMEPRAZOLE 40 MG/1
40 CAPSULE, DELAYED RELEASE ORAL DAILY
Qty: 180 CAPSULE | Refills: 3 | Status: SHIPPED | OUTPATIENT
Start: 2022-11-01 | End: 2023-11-10

## 2022-11-01 RX ORDER — OMEPRAZOLE 40 MG/1
CAPSULE, DELAYED RELEASE ORAL
Qty: 90 CAPSULE | Refills: 7 | Status: SHIPPED | OUTPATIENT
Start: 2022-11-01 | End: 2022-11-29

## 2022-11-03 ENCOUNTER — PRE VISIT (OUTPATIENT)
Dept: ORTHOPEDICS | Facility: CLINIC | Age: 52
End: 2022-11-03

## 2022-11-03 ENCOUNTER — OFFICE VISIT (OUTPATIENT)
Dept: ORTHOPEDICS | Facility: CLINIC | Age: 52
End: 2022-11-03
Payer: COMMERCIAL

## 2022-11-03 DIAGNOSIS — M75.81 ROTATOR CUFF TENDINITIS, RIGHT: Primary | ICD-10-CM

## 2022-11-03 DIAGNOSIS — M75.41 IMPINGEMENT SYNDROME OF RIGHT SHOULDER: ICD-10-CM

## 2022-11-03 PROCEDURE — 99203 OFFICE O/P NEW LOW 30 MIN: CPT | Performed by: FAMILY MEDICINE

## 2022-11-03 RX ORDER — CAPSAICIN 0.025 %
CREAM (GRAM) TOPICAL
Qty: 60 G | Refills: 1 | Status: ON HOLD | OUTPATIENT
Start: 2022-11-03 | End: 2022-12-06

## 2022-11-03 NOTE — TELEPHONE ENCOUNTER
Surgery is booked with Dr Wilhelm for Nov 30    Dr Guerra - insurance is asking if Boniva or Reclast can be tried instead of Evenity - please see notes from insurance reviewer for the [facility].    Donna Rogers RN on 11/2/2022 at 7:19 PM

## 2022-11-03 NOTE — LETTER
11/3/2022      RE: Zayra Ramirez  [address] [place] [address]     Dear Colleague,    Thank you for referring your patient, Zayra Ramirez, to the [facility] [place]. Please see a copy of my visit note below.    Sports Medicine Clinic Visit    PCP: Neena Tam    Zayra Ramirez is a 52 year old female who is seen  in consultation at the request of Dr. Dhaliwal presenting with right shoulder pain    Patient has noted right-sided shoulder impingement pain.  She has been working on a home program of posterior shoulder strength and range of motion with a pulley system, through physical therapy.  She had an office-based subacromial injection from an orthopedic surgeon at [facility] approximately 5 months ago, after a work-up for pseudogout involving the right shoulder.  She did find the subacromial injection helpful at the time.  She followed this up with physical therapy.  No recent shoulder effusion or inflammation or injury.    Patient is already maintained on Celebrex for history of inflammatory arthritis.  She sees rheumatology for this and her most recent rheumatology consults, discussing her right shoulder, was reviewed by me from 7/29/2022.  She is also on prednisone.  She is maintained on buspirone and Lyrica and Cymbalta.    Patient has an upcoming orthopedic spine surgery scheduled with Dr. Wilhelm for her lumbar spine, assisted by Dr. Sands, 11/30/2022.    Injury: No injury    Location of Pain: right shoulder deep  Duration of Pain: 1 year(s)  Rating of Pain: 7/10  Pain is better with: Prednisone, HEP, CSI  Pain is worse with: Picking up heavy objects  Additional Features: catching  Treatment so far consists of: Previous PT, CSI lasted a couple months  Prior History of related problems: none    LMP  (LMP Unknown)       Patient has an upcoming orthopedic spine surgery scheduled with Dr. Wilhelm for her cervical spine, assisted by Dr. Sands,  "11/30/2022.  She has a history of a previous lumbar spine epidural abscess with laminectomy.    Patient has a history of RA and of RC tendinitis and of  pseudogout involving her right shoulder, status post injection 7/13/2022, please see [facility] discharge summary from 7/13/2022, listed below:  \"DISCHARGE SUMMARY     (Hospitalist Service)     Date of Admission:  7/13/2022  Date of Discharge:  7/15/2022  1:38 PM  Discharging Provider: Kobi Martinez MD  Date of Service (when I saw the patient): 7/15/2022          Discharge Diagnoses        Right shoulder pain.  Pseudogout flareup.  Rheumatoid arthritis.  Hypokalemia.  Anxiety/depression.  Restless leg syndrome.  Obstructive sleep apnea.          History of Present Illness        Zayra Ramirez is a 52 year old female with a past medical history of obesity, anxiety/depression, RLS, ROBERT, rheumatoid arthritis followed by rheumatology with chronic shoulder pain who presents with worsening right shoulder pain.     Patient notes that she has chronic bilateral shoulder pain for which she is seen by both orthopedics and rheumatology.  Rheumatology does treat her for rheumatoid arthritis for which she is on methotrexate.  This is a relatively new medication for her.  Her rheumatologist thinks that her bilateral chronic shoulder pain is mostly due to rotator cuff issues.  She follows with orthopedics for this.       She had noticed sudden worsening of her right shoulder pain on the evening of 7/12. Pain with little movement of the joint.     Noted to have leukocytosis with elevated inflammatory markers.  Afebrile in the emergency room.     Shoulder joint aspiration was attempted by ER physician and small amount of fluid was removed.  It was sent to the lab for further evaluation: Which showed CPPD crystals.  Negative gram stain.  Cultures pending.  Blood cultures also sent.  Started on IV antibiotic treatment.             Hospital Course        Right shoulder " "pain.  Acute on chronic.  -  Initially the main differential is crystal arthropathy versus infection.  - MRI of the shoulder is suggestive of infection.  But there is CPP crystals on joint aspiration.  -  Orthopedic consult obtained.  Patient was initially seen by  Dr. Smith: Recommended continuing observation given high probability of pseudogout flareup.  - The next day, patient was seen by [facility] shoulder specialist Dr. Cruz: Despite the MRI findings, his opinion was that this is pseudogout flareup.  - For that reason, shoulder steroid injection was given to the patient.  After that there was dramatic improvement in her symptoms.  - Antibiotics have been discontinued.  Cultures remain negative.  - Patient is being discharged on NSAID, indomethacin.  - Follow-up per orthopedic team.     Hypokalemia  - Replacement per protocol.     History of rheumatoid arthritis.  - Methotrexate held at admission but can be resumed now.  Follows up with Dr. Saenz  - Continue Cymbalta, folic acid, Lyrica     Anxiety/depression  - Abilify, BuSpar, Cymbalta     Restless leg syndrome  - On Requip     ROBERT  --CPAP        I personally evaluated and examined the patient on the day of discharge.     Kobi Martinez MD\"            EXAM: XR SHOULDER RIGHT G/E 3 VIEWS  LOCATION: [facility]  DATE/TIME: 7/13/2022 7:20 PM     INDICATION: Right shoulder pain.  COMPARISON: None available.                                                                      IMPRESSION: Anatomic alignment right shoulder. No acute displaced right shoulder fracture. Mild-moderate right acromioclavicular joint arthrosis. Glenohumeral joint space is not profiled. Visualized right lung grossly clear. Degenerative change   visualized spine partially seen. Postop change lower cervical spine.          CT cervical spine 11/11/2021  Impression:   1. Stable alignment of ACDF of C3-C5 redemonstration of fracture of  bilateral screws at  C3 and 1 mm " lucency around the left screw at C5.  No new periprosthetic lucencies or fractures to suggest hardware  malalignment.  2. Redemonstration of multilevel degenerative changes of the cervical  spine, most prominent from C3-C5, with mild to moderate spinal canal  stenosis. Overall, there is no significant change compared to prior CT  cervical spine on 4/21/2021.          PMH:  Past Medical History:   Diagnosis Date     Allergic rhinitis      Anemia      Arthritis      Asthma     copd     Dental abscess 08/2015     Depressive disorder      Gastroesophageal reflux disease      History of emphysema      Hoarseness      Hypertension      Obstructive sleep apnea      Other chronic pain      Respiratory bronchiolitis interstitial lung disease (H)      Sleep apnea      Smoker 11/02/2015       Active problem list:  Patient Active Problem List   Diagnosis     Lumbago     Facial cellulitis     Class 2 severe obesity with serious comorbidity and body mass index (BMI) of 35.0 to 35.9 in adult, unspecified obesity type (H)     Dental abscess     Hypoxia     Subcutaneous emphysema (H)     Borderline personality disorder (H)     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)     Esophageal stricture     Obstruction of esophagus     HTN (hypertension)     Interstitial lung disease (H)     Moderate persistent asthma without complication     Onychomycosis     Restless leg syndrome     Seasonal allergies     Stress     Respiratory bronchiolitis interstitial lung disease (H)     Bilateral shoulder pain     Spinal epidural abscess     Lumbar spondylosis     Inflammatory arthritis     Arthralgia of temporomandibular joint     Articular disc disorder of temporomandibular joint     Derangement of temporomandibular joint     Calculus of gallbladder without cholecystitis without obstruction     Displacement of articular disc of temporomandibular joint with reduction     Myofascial pain     Oral lesion     Symptomatic cholelithiasis      Sacro-iliac pain     Degenerative lumbar spinal stenosis     Chronic lumbar radiculopathy     Glucose intolerance     Chronic neck pain     Lumbar radiculopathy, chronic     Cervical radiculopathy     Acute pain of right shoulder     Other osteoporosis without current pathological fracture     Diaphragmatic hernia     Bipolar 2 disorder (H)     Choking sensation     Gastro-esophageal reflux disease with esophagitis     Limited opening of mandible     History of pelvic surgery     Voiding dysfunction     Pelvic floor dysfunction     Urinary hesitancy     Urinary frequency       FH:  Family History   Problem Relation Age of Onset     Asthma Mother      Restless Leg Syndrome Mother      Other Cancer Father         base of tongue cancer at age ~65     Hypertension Father      Back Pain Father      Restless Leg Syndrome Father      Coronary Artery Disease Father      Restless Leg Syndrome Sister      Breast Cancer Maternal Aunt 55     Anesthesia Reaction No family hx of      Deep Vein Thrombosis (DVT) No family hx of      Thyroid Cancer No family hx of      Multiple endocrine neoplasia No family hx of        SH:  Social History     Socioeconomic History     Marital status: Single     Spouse name: Not on file     Number of children: Not on file     Years of education: Not on file     Highest education level: Not on file   Occupational History     Not on file   Tobacco Use     Smoking status: Former     Packs/day: 1.00     Years: 30.00     Pack years: 30.00     Types: Cigarettes     Start date: 1996     Quit date: 2021     Years since quittin.9     Smokeless tobacco: Never   Vaping Use     Vaping Use: Former   Substance and Sexual Activity     Alcohol use: No     Drug use: Yes     Types: Marijuana     Comment: very rarely      Sexual activity: Never   Other Topics Concern     Parent/sibling w/ CABG, MI or angioplasty before 65F 55M? Not Asked   Social History Narrative     Not on file     Social Determinants  of Health     Financial Resource Strain: Not on file   Food Insecurity: Not on file   Transportation Needs: Not on file   Physical Activity: Not on file   Stress: Not on file   Social Connections: Not on file   Intimate Partner Violence: Not on file   Housing Stability: Not on file       MEDS:  See EMR, reviewed  ALL:  See EMR, reviewed    REVIEW OF SYSTEMS:  CONSTITUTIONAL:NEGATIVE for fever, chills, change in weight  INTEGUMENTARY/SKIN: NEGATIVE for worrisome rashes, moles or lesions  EYES: NEGATIVE for vision changes or irritation  ENT/MOUTH: NEGATIVE for ear, mouth and throat problems  RESP:NEGATIVE for significant cough or SOB  BREAST: NEGATIVE for masses, tenderness or discharge  CV: NEGATIVE for chest pain, palpitations or peripheral edema  GI: NEGATIVE for nausea, abdominal pain, heartburn, or change in bowel habits  :NEGATIVE for frequency, dysuria, or hematuria  :NEGATIVE for frequency, dysuria, or hematuria  NEURO: NEGATIVE for weakness, dizziness or paresthesias  ENDOCRINE: NEGATIVE for temperature intolerance, skin/hair changes  HEME/ALLERGY/IMMUNE: NEGATIVE for bleeding problems  PSYCHIATRIC: NEGATIVE for changes in mood or affect    Objective: In the supine position with the shoulders relaxed she has full passive range of motion at the right shoulder with no evidence of a frozen shoulder.  There is no signs of a effusion.  She tolerates range of motion with mild impingement pain.  She is nontender over the AC joint anterior cuff or biceps tendon.  The overlying skin is normal.  Shoulder is not tender to the touch.  She has 5 out of 5 strength at deltoid, supraspinatus, infraspinatus and subscapularis.    Assessment: Recurrent right shoulder impingement pain.  History of rotator cuff tendinitis.  History of rheumatoid arthritis.  History of pseudogout involving the right shoulder.  Upcoming orthopedic surgery for the spine.    Plan: She is already maintained on prednisone and has an upcoming spine  surgery.  Think would be best to avoid additional cortisone injections at this time.  I do not see evidence of a pseudogout flare on today's clinical exam.  She has no frozen shoulder on clinical exam.  I asked her to continue with her rotator cuff protocol and we agreed to try some topical capsaicin.  After she recovers from her lumbar spine surgery and has gone through healing.,  She understands that an additional subacromial injection for shoulder could be considered at that time.    Again, thank you for allowing me to participate in the care of your patient.      Sincerely,    Mukund Dave MD

## 2022-11-03 NOTE — PROGRESS NOTES
Sports Medicine Clinic Visit    PCP: Neena Tam    Zayra Ramirez is a 52 year old female who is seen  in consultation at the request of Dr. Dhaliwal presenting with right shoulder pain    Patient has noted right-sided shoulder impingement pain.  She has been working on a home program of posterior shoulder strength and range of motion with a pulley system, through physical therapy.  She had an office-based subacromial injection from an orthopedic surgeon at [facility] approximately 5 months ago, after a work-up for pseudogout involving the right shoulder.  She did find the subacromial injection helpful at the time.  She followed this up with physical therapy.  No recent shoulder effusion or inflammation or injury.    Patient is already maintained on Celebrex for history of inflammatory arthritis.  She sees rheumatology for this and her most recent rheumatology consults, discussing her right shoulder, was reviewed by me from 7/29/2022.  She is also on prednisone.  She is maintained on buspirone and Lyrica and Cymbalta.    Patient has an upcoming orthopedic spine surgery scheduled with Dr. Wilhelm for her lumbar spine, assisted by Dr. Sands, 11/30/2022.    Injury: No injury    Location of Pain: right shoulder deep  Duration of Pain: 1 year(s)  Rating of Pain: 7/10  Pain is better with: Prednisone, HEP, CSI  Pain is worse with: Picking up heavy objects  Additional Features: catching  Treatment so far consists of: Previous PT, CSI lasted a couple months  Prior History of related problems: none    LMP  (LMP Unknown)       Patient has an upcoming orthopedic spine surgery scheduled with Dr. Wilhelm for her cervical spine, assisted by Dr. Sands, 11/30/2022.  She has a history of a previous lumbar spine epidural abscess with laminectomy.    Patient has a history of RA and of RC tendinitis and of  pseudogout involving her right shoulder, status post injection 7/13/2022, please see [facility] discharge  "summary from 7/13/2022, listed below:  \"DISCHARGE SUMMARY     (Hospitalist Service)     Date of Admission:  7/13/2022  Date of Discharge:  7/15/2022  1:38 PM  Discharging Provider: Kobi Martinez MD  Date of Service (when I saw the patient): 7/15/2022          Discharge Diagnoses        Right shoulder pain.  Pseudogout flareup.  Rheumatoid arthritis.  Hypokalemia.  Anxiety/depression.  Restless leg syndrome.  Obstructive sleep apnea.           History of Present Illness        Zayra Ramirez is a 52 year old female with a past medical history of obesity, anxiety/depression, RLS, ROBERT, rheumatoid arthritis followed by rheumatology with chronic shoulder pain who presents with worsening right shoulder pain.     Patient notes that she has chronic bilateral shoulder pain for which she is seen by both orthopedics and rheumatology.  Rheumatology does treat her for rheumatoid arthritis for which she is on methotrexate.  This is a relatively new medication for her.  Her rheumatologist thinks that her bilateral chronic shoulder pain is mostly due to rotator cuff issues.  She follows with orthopedics for this.       She had noticed sudden worsening of her right shoulder pain on the evening of 7/12. Pain with little movement of the joint.     Noted to have leukocytosis with elevated inflammatory markers.  Afebrile in the emergency room.     Shoulder joint aspiration was attempted by ER physician and small amount of fluid was removed.  It was sent to the lab for further evaluation: Which showed CPPD crystals.  Negative gram stain.  Cultures pending.  Blood cultures also sent.  Started on IV antibiotic treatment.             Hospital Course        Right shoulder pain.  Acute on chronic.  -  Initially the main differential is crystal arthropathy versus infection.  - MRI of the shoulder is suggestive of infection.  But there is CPP crystals on joint aspiration.  -  Orthopedic consult obtained.  Patient was initially seen by  Dr." "Luis: Recommended continuing observation given high probability of pseudogout flareup.  - The next day, patient was seen by [facility] shoulder specialist Dr. Cruz: Despite the MRI findings, his opinion was that this is pseudogout flareup.  - For that reason, shoulder steroid injection was given to the patient.  After that there was dramatic improvement in her symptoms.  - Antibiotics have been discontinued.  Cultures remain negative.  - Patient is being discharged on NSAID, indomethacin.  - Follow-up per orthopedic team.     Hypokalemia  - Replacement per protocol.     History of rheumatoid arthritis.  - Methotrexate held at admission but can be resumed now.  Follows up with Dr. Saenz  - Continue Cymbalta, folic acid, Lyrica     Anxiety/depression  - Abilify, BuSpar, Cymbalta     Restless leg syndrome  - On Requip     ROBERT  --CPAP        I personally evaluated and examined the patient on the day of discharge.     Kobi Martinez MD\"            EXAM: XR SHOULDER RIGHT G/E 3 VIEWS  LOCATION: [facility]  DATE/TIME: 7/13/2022 7:20 PM     INDICATION: Right shoulder pain.  COMPARISON: None available.                                                                      IMPRESSION: Anatomic alignment right shoulder. No acute displaced right shoulder fracture. Mild-moderate right acromioclavicular joint arthrosis. Glenohumeral joint space is not profiled. Visualized right lung grossly clear. Degenerative change   visualized spine partially seen. Postop change lower cervical spine.          CT cervical spine 11/11/2021  Impression:   1. Stable alignment of ACDF of C3-C5 redemonstration of fracture of  bilateral screws at  C3 and 1 mm lucency around the left screw at C5.  No new periprosthetic lucencies or fractures to suggest hardware  malalignment.  2. Redemonstration of multilevel degenerative changes of the cervical  spine, most prominent from C3-C5, with mild to moderate spinal canal  stenosis. " Overall, there is no significant change compared to prior CT  cervical spine on 4/21/2021.          PMH:  Past Medical History:   Diagnosis Date     Allergic rhinitis      Anemia      Arthritis      Asthma     copd     Dental abscess 08/2015     Depressive disorder      Gastroesophageal reflux disease      History of emphysema      Hoarseness      Hypertension      Obstructive sleep apnea      Other chronic pain      Respiratory bronchiolitis interstitial lung disease (H)      Sleep apnea      Smoker 11/02/2015       Active problem list:  Patient Active Problem List   Diagnosis     Lumbago     Facial cellulitis     Class 2 severe obesity with serious comorbidity and body mass index (BMI) of 35.0 to 35.9 in adult, unspecified obesity type (H)     Dental abscess     Hypoxia     Subcutaneous emphysema (H)     Borderline personality disorder (H)     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)     Esophageal stricture     Obstruction of esophagus     HTN (hypertension)     Interstitial lung disease (H)     Moderate persistent asthma without complication     Onychomycosis     Restless leg syndrome     Seasonal allergies     Stress     Respiratory bronchiolitis interstitial lung disease (H)     Bilateral shoulder pain     Spinal epidural abscess     Lumbar spondylosis     Inflammatory arthritis     Arthralgia of temporomandibular joint     Articular disc disorder of temporomandibular joint     Derangement of temporomandibular joint     Calculus of gallbladder without cholecystitis without obstruction     Displacement of articular disc of temporomandibular joint with reduction     Myofascial pain     Oral lesion     Symptomatic cholelithiasis     Sacro-iliac pain     Degenerative lumbar spinal stenosis     Chronic lumbar radiculopathy     Glucose intolerance     Chronic neck pain     Lumbar radiculopathy, chronic     Cervical radiculopathy     Acute pain of right shoulder     Other osteoporosis without current  pathological fracture     Diaphragmatic hernia     Bipolar 2 disorder (H)     Choking sensation     Gastro-esophageal reflux disease with esophagitis     Limited opening of mandible     History of pelvic surgery     Voiding dysfunction     Pelvic floor dysfunction     Urinary hesitancy     Urinary frequency       FH:  Family History   Problem Relation Age of Onset     Asthma Mother      Restless Leg Syndrome Mother      Other Cancer Father         base of tongue cancer at age ~65     Hypertension Father      Back Pain Father      Restless Leg Syndrome Father      Coronary Artery Disease Father      Restless Leg Syndrome Sister      Breast Cancer Maternal Aunt 55     Anesthesia Reaction No family hx of      Deep Vein Thrombosis (DVT) No family hx of      Thyroid Cancer No family hx of      Multiple endocrine neoplasia No family hx of        SH:  Social History     Socioeconomic History     Marital status: Single     Spouse name: Not on file     Number of children: Not on file     Years of education: Not on file     Highest education level: Not on file   Occupational History     Not on file   Tobacco Use     Smoking status: Former     Packs/day: 1.00     Years: 30.00     Pack years: 30.00     Types: Cigarettes     Start date: 1996     Quit date: 2021     Years since quittin.9     Smokeless tobacco: Never   Vaping Use     Vaping Use: Former   Substance and Sexual Activity     Alcohol use: No     Drug use: Yes     Types: Marijuana     Comment: very rarely      Sexual activity: Never   Other Topics Concern     Parent/sibling w/ CABG, MI or angioplasty before 65F 55M? Not Asked   Social History Narrative     Not on file     Social Determinants of Health     Financial Resource Strain: Not on file   Food Insecurity: Not on file   Transportation Needs: Not on file   Physical Activity: Not on file   Stress: Not on file   Social Connections: Not on file   Intimate Partner Violence: Not on file   Housing  Stability: Not on file       MEDS:  See EMR, reviewed  ALL:  See EMR, reviewed    REVIEW OF SYSTEMS:  CONSTITUTIONAL:NEGATIVE for fever, chills, change in weight  INTEGUMENTARY/SKIN: NEGATIVE for worrisome rashes, moles or lesions  EYES: NEGATIVE for vision changes or irritation  ENT/MOUTH: NEGATIVE for ear, mouth and throat problems  RESP:NEGATIVE for significant cough or SOB  BREAST: NEGATIVE for masses, tenderness or discharge  CV: NEGATIVE for chest pain, palpitations or peripheral edema  GI: NEGATIVE for nausea, abdominal pain, heartburn, or change in bowel habits  :NEGATIVE for frequency, dysuria, or hematuria  :NEGATIVE for frequency, dysuria, or hematuria  NEURO: NEGATIVE for weakness, dizziness or paresthesias  ENDOCRINE: NEGATIVE for temperature intolerance, skin/hair changes  HEME/ALLERGY/IMMUNE: NEGATIVE for bleeding problems  PSYCHIATRIC: NEGATIVE for changes in mood or affect      Objective: In the supine position with the shoulders relaxed she has full passive range of motion at the right shoulder with no evidence of a frozen shoulder.  There is no signs of a effusion.  She tolerates range of motion with mild impingement pain.  She is nontender over the AC joint anterior cuff or biceps tendon.  The overlying skin is normal.  Shoulder is not tender to the touch.  She has 5 out of 5 strength at deltoid, supraspinatus, infraspinatus and subscapularis.      Assessment: Recurrent right shoulder impingement pain.  History of rotator cuff tendinitis.  History of rheumatoid arthritis.  History of pseudogout involving the right shoulder.  Upcoming orthopedic surgery for the spine.    Plan: She is already maintained on prednisone and has an upcoming spine surgery.  Think would be best to avoid additional cortisone injections at this time.  I do not see evidence of a pseudogout flare on today's clinical exam.  She has no frozen shoulder on clinical exam.  I asked her to continue with her rotator cuff  protocol and we agreed to try some topical capsaicin.  After she recovers from her lumbar spine surgery and has gone through healing.,  She understands that an additional subacromial injection for shoulder could be considered at that time.

## 2022-11-05 RX ORDER — LIRAGLUTIDE 6 MG/ML
INJECTION, SOLUTION SUBCUTANEOUS
Qty: 9 ML | Refills: 0 | Status: SHIPPED | OUTPATIENT
Start: 2022-11-05 | End: 2022-11-29

## 2022-11-07 LAB
ABO/RH(D): NORMAL
ANTIBODY SCREEN: NEGATIVE
SPECIMEN EXPIRATION DATE: NORMAL

## 2022-11-07 RX ORDER — MULTIVIT WITH MINERALS/LUTEIN
1000 TABLET ORAL EVERY MORNING
Status: ON HOLD | COMMUNITY
End: 2024-02-16

## 2022-11-07 RX ORDER — ACETAMINOPHEN 500 MG
1000 TABLET ORAL 2 TIMES DAILY
Status: ON HOLD | COMMUNITY
End: 2022-12-06

## 2022-11-07 NOTE — PROGRESS NOTES
Preoperative Assessment Center Medication History Note    Medication history completed on November 7, 2022 by this writer. See Epic admission navigator for prior to admission medications. Operating room staff will still need to confirm medications and last dose information on day of surgery.     Medication history interview sources  Patient interview: Yes  Care Everywhere records: Yes  Surescripts pharmacy refill records: Yes  Other (if applicable):     Changes made to PTA medication list  Added: vitron C, vitamin E, aiden-mag, apap,   Deleted: duplicate albuterol, diazepam (completed), prednisone taper (completed about 1 week ago),   Changed: folic acid sig,     Additional medication history information (including reliability of information, actions taken by pharmacist):    -- No recent (within 30 days) course of antibiotics  -- did recently complete a prednisone taper for RA about 1 week ago and will not have any more steroids until after upcoming surgery.   -- Reports not being on blood thinning medications    -- Declines being on any other prescription or over-the-counter medications    Prior to Admission medications    Medication Sig Last Dose Taking? Auth Provider Long Term End Date   acetaminophen (TYLENOL) 500 MG tablet Take 1,000 mg by mouth 2 times daily Taking Yes Unknown, Entered By History     albuterol (PROAIR HFA/PROVENTIL HFA/VENTOLIN HFA) 108 (90 Base) MCG/ACT inhaler Inhale 2 puffs into the lungs every 6 hours as needed for shortness of breath / dyspnea or wheezing Ventolin please Taking Yes Neena Tam, APRN CNP Yes    albuterol (PROVENTIL) (2.5 MG/3ML) 0.083% neb solution INHALE 3 ML VIA A NEBULIZER 4 TIMES DAILY IF NEEDED. Taking Yes Reported, Patient Yes    ARIPiprazole (ABILIFY) 5 MG tablet Take 5 mg by mouth daily Taking Yes Unknown, Entered By History     Black Pepper-Turmeric (TURMERIC CURCUMIN) 5-1000 MG CAPS Take 1 capsule by mouth daily Taking Yes Reported, Patient     busPIRone HCl  (BUSPAR) 30 MG tablet Take 30 mg by mouth 2 times daily  Taking Yes Reported, Patient Yes    calcium-magnesium (CALMAG) 500-250 MG TABS per tablet Take 1 tablet by mouth daily Taking Yes Unknown, Entered By History     carboxymethylcellulose (CARBOXYMETHYLCELLULOSE SODIUM) 0.5 % SOLN ophthalmic solution Place 1 drop into both eyes 3 times daily as needed for dry eyes Taking Yes Leodan Gan MD     celecoxib (CELEBREX) 200 MG capsule Take 1 capsule (200 mg) by mouth every morning Taking Yes Panchito Saenz MD Yes    cyclobenzaprine (FLEXERIL) 10 MG tablet Take 1 tablet (10 mg) by mouth At Bedtime Taking Yes Neena Tam, APRN CNP     DULoxetine (CYMBALTA) 60 MG capsule Take 120 mg by mouth At Bedtime  Taking Yes Unknown, Entered By History No    EPINEPHrine (EPIPEN/ADRENACLICK/OR ANY BX GENERIC EQUIV) 0.3 MG/0.3ML injection 2-pack Inject 0.3 mg into the muscle as needed Taking Yes Reported, Patient     ferrous fumarate 65 mg, [place]. FE,-Vitamin C 125 mg (VITRON C)  MG TABS tablet Take 1 tablet by mouth daily Taking Yes Unknown, Entered By History     folic acid (FOLVITE) 1 MG tablet Take 1 tablet (1 mg) by mouth daily  Patient taking differently: Take 1 mg by mouth At Bedtime Skips Sunday nights Taking Yes Panchito Saenz MD     lisinopril-hydrochlorothiazide (ZESTORETIC) 20-25 MG tablet Take 1 tablet by mouth daily Taking Yes Neena Tam, APRN CNP Yes    methotrexate sodium 2.5 MG TABS Take 10 tablets (25 mg) by mouth once a week MONDAYS Taking Yes Panchito Saenz MD Yes    montelukast (SINGULAIR) 10 MG tablet Take 10 mg by mouth daily Taking Yes Unknown, Entered By History Yes    omeprazole (PRILOSEC) 40 MG DR capsule Take 1 capsule (40 mg) by mouth daily Taking Yes Dior Arroyo PA-C     potassium 99 MG TABS Take 1 tablet by mouth daily Taking Yes Reported, Patient     pregabalin (LYRICA) 150 MG capsule Take 1 capsule (150 mg) by mouth 2 times daily  Patient taking  differently: Take 150 mg by mouth 2 times daily AM and around 1 PM Taking Yes Goltz, Bennett Ezra, PA-C Yes    rOPINIRole (REQUIP) 0.5 MG tablet Take 1 tablet (0.5 mg) by mouth At Bedtime Take 1 tab by mouth at bedtime. Taking Yes Goltz, Bennett Ezra, PA-C Yes    SAXENDA 18 MG/3ML pen INJECT 3 MG SUBCUTANEOUS DAILY Taking Yes Dorothy Hong MD     vitamin D3 (CHOLECALCIFEROL) 250 mcg (19582 units) capsule Take 1 capsule by mouth daily Taking Yes Unknown, Entered By History     vitamin E (TOCOPHEROL) 1000 units (450 mg) CAPS capsule Take 1,000 Units by mouth daily Taking Yes Unknown, Entered By History     zinc sulfate (ZINCATE) 220 (50 Zn) MG capsule Take 220 mg by mouth daily (with lunch)  Taking Yes Shara Negrete APRN CNP     capsaicin (ZOSTRIX) 0.025 % external cream Apply small amount to painful area up to three times daily  Patient not taking: Reported on 11/7/2022 Not Taking  Mukund Dave MD     insulin pen needle (32G X 4 MM) 32G X 4 MM miscellaneous Use 1 NEEDLE WEEKLY   Dorothy Hong MD     insulin pen needle (32G X 4 MM) 32G X 4 MM miscellaneous Use 1 NEEDLE daily to use with Neena Gregg APRN CNP     insulin pen needle (32G X 6 MM) 32G X 6 MM miscellaneous Use daily pen needles daily or as directed.   Dorothy Hong MD     omeprazole (PRILOSEC) 40 MG DR capsule TAKE 1 CAPSULE BY MOUTH EVERY DAY   Dior Arroyo PA-C     OXYGEN-HELIUM IN 3L @ night    Oxygen only not helium   Reported, Patient     Respiratory Therapy Supplies ([facility] CPAP FILTER) MISC    Reported, Patient            Medication history completed by: Agusto Thomas [facility]

## 2022-11-08 ENCOUNTER — LAB (OUTPATIENT)
Dept: LAB | Facility: CLINIC | Age: 52
End: 2022-11-08
Payer: COMMERCIAL

## 2022-11-08 ENCOUNTER — ANESTHESIA EVENT (OUTPATIENT)
Dept: SURGERY | Facility: CLINIC | Age: 52
End: 2022-11-08

## 2022-11-08 ENCOUNTER — MYC MEDICAL ADVICE (OUTPATIENT)
Dept: ORTHOPEDICS | Facility: CLINIC | Age: 52
End: 2022-11-08

## 2022-11-08 ENCOUNTER — OFFICE VISIT (OUTPATIENT)
Dept: SURGERY | Facility: CLINIC | Age: 52
End: 2022-11-08
Payer: COMMERCIAL

## 2022-11-08 ENCOUNTER — PRE VISIT (OUTPATIENT)
Dept: SURGERY | Facility: CLINIC | Age: 52
End: 2022-11-08

## 2022-11-08 ENCOUNTER — MEDICAL CORRESPONDENCE (OUTPATIENT)
Dept: HEALTH INFORMATION MANAGEMENT | Facility: CLINIC | Age: 52
End: 2022-11-08

## 2022-11-08 VITALS
RESPIRATION RATE: 16 BRPM | SYSTOLIC BLOOD PRESSURE: 99 MMHG | TEMPERATURE: 98.6 F | BODY MASS INDEX: 33.67 KG/M2 | HEIGHT: 64 IN | OXYGEN SATURATION: 100 % | HEART RATE: 76 BPM | DIASTOLIC BLOOD PRESSURE: 69 MMHG | WEIGHT: 197.2 LBS

## 2022-11-08 DIAGNOSIS — M40.35 FLATBACK SYNDROME OF THORACOLUMBAR REGION: ICD-10-CM

## 2022-11-08 DIAGNOSIS — Z11.59 ENCOUNTER FOR SCREENING FOR OTHER VIRAL DISEASES: Primary | ICD-10-CM

## 2022-11-08 DIAGNOSIS — Z01.818 PREOP EXAMINATION: Primary | ICD-10-CM

## 2022-11-08 DIAGNOSIS — Z01.818 PREOP EXAMINATION: ICD-10-CM

## 2022-11-08 LAB
ANION GAP SERPL CALCULATED.3IONS-SCNC: 11 MMOL/L (ref 7–15)
BASOPHILS # BLD AUTO: 0 10E3/UL (ref 0–0.2)
BASOPHILS NFR BLD AUTO: 0 %
BUN SERPL-MCNC: 12.6 MG/DL (ref 6–20)
CALCIUM SERPL-MCNC: 9.5 MG/DL (ref 8.6–10)
CHLORIDE SERPL-SCNC: 102 MMOL/L (ref 98–107)
CREAT SERPL-MCNC: 0.89 MG/DL (ref 0.51–0.95)
DEPRECATED HCO3 PLAS-SCNC: 28 MMOL/L (ref 22–29)
EOSINOPHIL # BLD AUTO: 0.1 10E3/UL (ref 0–0.7)
EOSINOPHIL NFR BLD AUTO: 1 %
ERYTHROCYTE [DISTWIDTH] IN BLOOD BY AUTOMATED COUNT: 14.7 % (ref 10–15)
GFR SERPL CREATININE-BSD FRML MDRD: 78 ML/MIN/1.73M2
GLUCOSE SERPL-MCNC: 91 MG/DL (ref 70–99)
HCT VFR BLD AUTO: 39.7 % (ref 35–47)
HGB BLD-MCNC: 13.1 G/DL (ref 11.7–15.7)
IMM GRANULOCYTES # BLD: 0 10E3/UL
IMM GRANULOCYTES NFR BLD: 0 %
LYMPHOCYTES # BLD AUTO: 1.6 10E3/UL (ref 0.8–5.3)
LYMPHOCYTES NFR BLD AUTO: 15 %
MCH RBC QN AUTO: 32.7 PG (ref 26.5–33)
MCHC RBC AUTO-ENTMCNC: 33 G/DL (ref 31.5–36.5)
MCV RBC AUTO: 99 FL (ref 78–100)
MONOCYTES # BLD AUTO: 0.8 10E3/UL (ref 0–1.3)
MONOCYTES NFR BLD AUTO: 8 %
NEUTROPHILS # BLD AUTO: 8.3 10E3/UL (ref 1.6–8.3)
NEUTROPHILS NFR BLD AUTO: 76 %
NRBC # BLD AUTO: 0 10E3/UL
NRBC BLD AUTO-RTO: 0 /100
PLATELET # BLD AUTO: 292 10E3/UL (ref 150–450)
POTASSIUM SERPL-SCNC: 3.8 MMOL/L (ref 3.4–5.3)
RBC # BLD AUTO: 4.01 10E6/UL (ref 3.8–5.2)
SODIUM SERPL-SCNC: 141 MMOL/L (ref 136–145)
WBC # BLD AUTO: 11 10E3/UL (ref 4–11)

## 2022-11-08 PROCEDURE — 99215 OFFICE O/P EST HI 40 MIN: CPT | Performed by: PHYSICIAN ASSISTANT

## 2022-11-08 PROCEDURE — 80048 BASIC METABOLIC PNL TOTAL CA: CPT | Performed by: PATHOLOGY

## 2022-11-08 PROCEDURE — 86901 BLOOD TYPING SEROLOGIC RH(D): CPT | Mod: 90 | Performed by: PATHOLOGY

## 2022-11-08 PROCEDURE — 99000 SPECIMEN HANDLING OFFICE-LAB: CPT | Performed by: PATHOLOGY

## 2022-11-08 PROCEDURE — 36415 COLL VENOUS BLD VENIPUNCTURE: CPT | Performed by: PATHOLOGY

## 2022-11-08 PROCEDURE — 86850 RBC ANTIBODY SCREEN: CPT | Mod: 90 | Performed by: PATHOLOGY

## 2022-11-08 PROCEDURE — 85025 COMPLETE CBC W/AUTO DIFF WBC: CPT | Performed by: PATHOLOGY

## 2022-11-08 PROCEDURE — 86900 BLOOD TYPING SEROLOGIC ABO: CPT | Mod: 90 | Performed by: PATHOLOGY

## 2022-11-08 RX ORDER — IPRATROPIUM BROMIDE AND ALBUTEROL SULFATE 2.5; .5 MG/3ML; MG/3ML
3 SOLUTION RESPIRATORY (INHALATION) ONCE
Status: CANCELLED | OUTPATIENT
Start: 2022-11-08 | End: 2022-11-08

## 2022-11-08 RX ORDER — SODIUM CHLORIDE, SODIUM LACTATE, POTASSIUM CHLORIDE, CALCIUM CHLORIDE 600; 310; 30; 20 MG/100ML; MG/100ML; MG/100ML; MG/100ML
INJECTION, SOLUTION INTRAVENOUS CONTINUOUS
Status: CANCELLED | OUTPATIENT
Start: 2022-11-08

## 2022-11-08 ASSESSMENT — ENCOUNTER SYMPTOMS: SEIZURES: 0

## 2022-11-08 ASSESSMENT — LIFESTYLE VARIABLES: TOBACCO_USE: 1

## 2022-11-08 ASSESSMENT — PAIN SCALES - GENERAL: PAINLEVEL: MILD PAIN (3)

## 2022-11-08 NOTE — PATIENT INSTRUCTIONS
Preparing for Your Surgery      Name:  Zayra Ramirez   MRN:  [id number]   :  1970   Today's Date:  2022       Arriving for surgery:  Surgery date:  22  Arrival time:  5:30 am     Surgeries and procedures: Adult patients can have 2 visitors all through the surgery process.     Visiting hours: 8 a.m. to 8:30 p.m.     Hospital: Adult patients and children under age 18 can have 4 visitor at a time     No visitors under the age of 5 are allowed for hospital patients.  Double occupancy rooms: Patients can have only two visitors at a time.     Patients with disabilities: Can have a support person with them (family member, service provider     Or someone well informed about their needs) plus the allowed number of visitors     Patients confirmed or suspected to have symptoms of COVID 19 or flu:     No visitors allowed for adult patients.   Children (under age 18) can have 1 named visitor.     People who are sick or showing symptoms of COVID 19 or flu:    Are not allowed to visit patients--we can only make exceptions in special situations.       Please follow these guidelines for your visit:   Arrive wearing a mask over your mouth and nose; we will give you a medical mask to wear    If you arrive wearing a cloth mask.   Keep it on during your entire visit, even when in patient's room.   If you don't wear a mask we'll ask you to leave.     Clean your hands with alcohol hand . Do this when you arrive at and leave the building and patient room,    And again after you touch your mask or anything in the room.     You can t visit if you have a fever, cough, shortness of breath, muscle aches, headaches, sore throat    Or diarrhea      Stay 6 feet away from others during your visit and between visits     Go directly to and from the room you are visiting.     Stay in the patient s room during your visit. Limit going to other places in the hospital as much as possible     Leave bags and jackets at home or  in the car.     For everyone s health, please don t come and go during your visit. That includes for smoking   during your visit.     Please come to:     [facility] [address]  [address] [place]  [address]    - parking is available in front of [facility] from 5:15AM to 8:00PM. If you prefer, park your car in the Green Lot.    -Proceed to the 3rd floor, check in at the Adult Surgery Waiting Lounge. [phone]    If an escort is needed stop at the Information Desk in the lobby. Inform the information person that you are here for surgery. An escort to the Adult Surgery Waiting Lounge will be provided.    What can I eat or drink?  -  You may eat and drink normally up to 8 hours prior to arrival time. (Until 9:30 pm)  -  You may have clear liquids until 2 hours prior to arrival time. (Until 3:30 am)    Examples of clear liquids:  Water  Clear broth  Juices (apple, white grape, white cranberry  and cider) without pulp  Noncarbonated, powder based beverages  (lemonade and Cristobal-Aid)  Sodas (Sprite, 7-Up, ginger ale and seltzer)  Coffee or tea (without milk or cream)  Gatorade    -  No Alcohol for at least 24 hours before surgery.     Which medicines can I take?    Hold Aspirin for 7 days before surgery.   Hold Multivitamins for 7 days before surgery.  Hold Supplements for 7 days before surgery.  Hold Ibuprofen (Advil, Motrin) for 1 day before surgery--unless otherwise directed by surgeon.  Hold Naproxen (Aleve) for 4 days before surgery.    Hold all NSAIDS for 7 days before spine surgery. (Ibuprofen, Naproxen, Celebrex, Indocin, Diclofenac.)    -  DO NOT take these medications the day of surgery:  Black pepper-Turmeric - stop 7 days before surgery  Calmag  Celebrex - stop 7 days before surgery  Vitron C  Folic acid  Lisinopril-hydrochlorothiazide  Methotrexate - last dose to be 11/14/22  Potassium  Saxenda  Vitamin D  Vitamin E - stop 7  days before surgery  Zinc    -  PLEASE TAKE these medications the day of surgery:  Tylenol if needed  Proair inhaler and bring this to the hospital  Albuterol neb if needed  Aripiprazole (Abilify)  Buspar  Carboxymethylcellulose eye drops and bring this to the hospital  Flexeril to be taken the night before surgery  Duloxetine (Cymbalta) to be taken the night before surgery  Singulair  Omeprazole  Lyrica  Requip to be taken the night before surgery      How do I prepare myself?  - Please take 2 showers before surgery using Scrubcare or Hibiclens soap.    Use this soap only from the neck to your toes.     Leave the soap on your skin for one minute--then rinse thoroughly.      You may use your own shampoo and conditioner. No other hair products.   - Please remove all jewelry and body piercings.  - No lotions, deodorants or fragrance.  - No makeup or fingernail polish.   - Bring your ID and insurance card.      Questions or Concerns:    - For any questions regarding the day of surgery or your hospital stay, please contact the Pre Admission Nursing Office at [phone].       - If you have health changes between today and your surgery, please call your surgeon.       - For questions after surgery, please call your surgeons office.            OPTIMAL RECOVERY AFTER SURGERY start this the day before surgery       Begin hydrating yourself by drinking at least 8-10 glasses of clear liquids for 24 hours before surgery:      Suggested clear liquids:   Water    Clear Juices   Clear Broth   Non- carbonated beverages    (Crystal Light or Cristobal Aid)   Sodas    (Sprite, 7 up, ginger ale, seltzer)   Gatorade              Drink clear liquids up until 4 hours before your surgery.       We would like you to purchase a drink such as Gatorade or Ensure Clear (not the milkshake type).  Drink this before bedtime and the morning of surgery drink between 8-10 ounces or until you feel hydrated.        Keeping well hydrated leads to your  veins being plump, you wake up faster, and you are less likely to be nauseated. Start drinking water as soon as you can after surgery and advance to clear liquids and food as tolerated.  IV fluids contain salt, drinking fluids will minimize the amount of IV fluids you need and decrease the amount of salt you get.                 The most common reason for the patient to be readmitted is dehydration. Staying hydrated after you go home from the hospital is very important.  Ensure or Ensure Clear are good options to keep you hydrated.

## 2022-11-08 NOTE — H&P
Pre-Operative H & P     CC:  Preoperative exam to assess for increased cardiopulmonary risk while undergoing surgery and anesthesia.    Date of Encounter: 11/8/2022  Primary Care Physician:  Neena Tam     Reason for Visit: Flatback syndrome of thoracolumbar region; Lumbar spondylosis; Spinal stenosis of lumbar region with neurogenic claudication; Lumbar radiculopathy      HPI        Zayra Ramirez is a 53 y/o female who presents for pre-operative H&P in preparation for Posterior instrumented spinal fusion Thoracic 8 to sacrum, with bilateral pelvic fixation; Transforaminal lumbar interbody fusion with Erickson-Nixon osteotomy lumbar 1 to sacral 1 (5 levels); possible cement augmentation of screws; Use of Infuse bone morphogenetic protein Large kit and allograft with Philip Wilhelm MD & Henry Sands MD on 11/30/22 at [facility] for treatment of Flatback syndrome of thoracolumbar region; Lumbar spondylosis; Spinal stenosis of lumbar region with neurogenic claudication; Lumbar radiculopathy .        Patient is being evaluated for comorbid conditions of HTN, ROBERT w/ CPAP w/ supplemental O2, ILD, former tobacco use, anxiety, depression, PTSD, RLS, neuropathy, borderline personality, morbid obesity, GERD, TMJ syndrome, rheumatoid arthritis, chronic immunosuppression, chronic right shoulder pain, pseudogout.        Ms. Ramirez has a history of cervical and lumbar pathology. Currently, her lumbar symptoms are more pressing. She has had several spine surgeries (see below). She has exhausted all conservative measures and is no longer receiving adequate relief from epidural injections, and her stenosis continues to worsen. She considers her symptoms to be further worsening over time.  Unhappy with current symptoms, which she considers very disabling, and significantly limiting her quality of life. Recommended surgery would be a large multilevel posterior fusion spanning her  lower thoracic spine to pelvis. She now presents for the above procedure.        Spine Surgical Hx:      10/16/2017 - ACDF with plate fixation C3-C5 (2 levels); use of [facility] allograft (Dr. Rylan Monique, LADI).  [Implants: Medtronic Zevo plate].      08/08/2019 - SCS implantation ([facility]); complicated by epidural abscess MSSA culture positive, treated with drainage/laminectomy and IV antibiotics.  Thus, the SCS was removed.      08/19/2019 - Laminectomies T11-L1 (T12-L2) [facility]      The patient is followed by Jennifer Bonilla MD in pulmonology at [facility]. She was last seen on 1/7/22. She has shortness of breath, hypoxemia, obstructive ventilatory defect on spirometry and abnormal CT CHEST 6/3/2016, most consistent with respiratory bronchiolitis. The process appears to have been going on since at least 11/2015, no improvement with steroids, CT CHEST 11/22/2020 with same findings. She quit smoking on 11/14/21. She has Obstructive sleep apnea, CPAP at 11 cm of water pressure with supplemental oxygen at 2-3 lpm.      PLAN:    Must not smoke    Continue off all steroid inhalers    CPAP at 11 cm of water with oxygen at 3 lpm    Albuterol nebulizer three times daily as needed/ albuterol inhaler as needed    Singulair 1 tablet daily oral-okay to hold off during winter months    Return to clinic in 1 year        History was obtained from patient & chart review. She is accompanied by her step-mother.      Hx of abnormal bleeding or anti-platelet use: denies    Menstrual history: No LMP recorded (lmp unknown). Patient has had a hysterectomy.:       Past Medical History  Past Medical History:   Diagnosis Date     Allergic rhinitis      Anemia      Arthritis      Asthma     copd     Dental abscess 08/2015     Depressive disorder      Gastroesophageal reflux disease      History of emphysema      Hoarseness      Hypertension      Obstructive sleep apnea      Other chronic pain      Respiratory bronchiolitis  interstitial lung disease (H)      Sleep apnea      Smoker 11/02/2015       Past Surgical History  Past Surgical History:   Procedure Laterality Date     CERVICAL FUSION       COLONOSCOPY       COLONOSCOPY N/A 02/06/2020    Procedure: COLONOSCOPY, WITH POLYPECTOMY AND BIOPSY;  Surgeon: Julian Mccullough MD;  Location:  GI     ENT SURGERY       ESOPHAGOSCOPY, GASTROSCOPY, DUODENOSCOPY (EGD), COMBINED N/A 02/06/2020    Procedure: ESOPHAGOGASTRODUODENOSCOPY (EGD);  Surgeon: Julian Mccullough MD;  Location:  GI     EXCISE LESION INTRAORAL Bilateral 10/03/2018    Procedure: EXCISE LESION INTRAORAL;  Wide Local Excision Of of Left Oral Cavity Ulcer;  Surgeon: Morro Mijares MD;  Location: [facility] OR     HC DRAIN SKIN ABSCESS SIMPLE/SINGLE  03/16/2012    Procedure:INCISION AND DRAINAGE, ABSCESS, SIMPLE; Surgeon:CHRISTIANO HANCOCK; Location: GI     HEAD & NECK SURGERY       HYSTERECTOMY       HYSTERECTOMY       INCISION AND DRAINAGE ABDOMEN WASHOUT, COMBINED       INJECT EPIDURAL CERVICAL N/A 10/14/2021    Procedure: Cervical 7- Thoracic 1 epidural steroid injection with fluoroscopy;  Surgeon: Tram Florian MD;  Location: [facility] OR     INJECT EPIDURAL LUMBAR Right 09/15/2020    Procedure: Lumbar5- sacral 1 epidural steroid injection with fluoroscopy;  Surgeon: Tram Florian MD;  [facility]     INJECT EPIDURAL LUMBAR Right 06/29/2021    Procedure: Lumbar 5 sacral 1 epidural steroid injection with fluoroscopy;  Surgeon: Tram Florian MD;  Location: [facility] OR     INJECT SACROILIAC JOINT Bilateral 06/16/2020    Procedure: Bilateral sacroiliac joint steroid injection with fluoroscopy;  Surgeon: Tram Florian MD;  [facility]     LAMINECTOMY THORACIC ONE LEVEL N/A 08/19/2019    Procedure: LAMINECTOMY, SPINE, THORACIC, 11-12 and Part of Lumbar 1, DRAINAGE OF EPIDURAL ABCESS, Epidural Drain Placement X 2;  Surgeon: Yadiel Beal MD;  Location: [facility] OR     MD PERCUT IMPLNT NEUROELECT,EPIDURAL  N/A 08/08/2019    Procedure: TRIAL, SPINAL CORD STIMULATOR WITH BOSTON Acesis;  Surgeon: Sipple, Daniel Peter, DO;  Location: [facility];  Service: Pain     RADIO FREQUENCY ABLATION / DESTRUCTION OF SACROILOAC JOINT DORSAL PRIMARY RAMUS Bilateral 12/17/2019    Procedure: Bilateral lumbar radiofrequency ablation with fluoroscopy and intravenous sedation ( Lumbar 2,3,4,5 medial branch nerves for the bilateral lumbar3-4, 4-5 and 5-sacral1 joints.;  Surgeon: Tram Florian MD;  Location:  OR     RADIO FREQUENCY ABLATION / DESTRUCTION OF SACROILOAC JOINT DORSAL PRIMARY RAMUS Right 11/17/2020    Procedure: Right lumbar medial branch nerve radiofrequency ablation right L2,3,4,5 nerves supplying the right L3-4, L4-5 and L5-S1 facet joints;  Surgeon: Tram Florian MD;  [place] [facility] [place]     spinal cord stimulator  08/08/2019     spinal cord stimulator removal  08/13/2019       Prior to Admission Medications  Current Outpatient Medications   Medication Sig Dispense Refill     acetaminophen (TYLENOL) 500 MG tablet Take 1,000 mg by mouth 2 times daily       albuterol (PROAIR HFA/PROVENTIL HFA/VENTOLIN HFA) 108 (90 Base) MCG/ACT inhaler Inhale 2 puffs into the lungs every 6 hours as needed for shortness of breath / dyspnea or wheezing Ventolin please 18 g 2     albuterol (PROVENTIL) (2.5 MG/3ML) 0.083% neb solution INHALE 3 ML VIA A NEBULIZER 4 TIMES DAILY IF NEEDED.       ARIPiprazole (ABILIFY) 5 MG tablet Take 5 mg by mouth daily       Black Pepper-Turmeric (TURMERIC CURCUMIN) 5-1000 MG CAPS Take 1 capsule by mouth daily       busPIRone HCl (BUSPAR) 30 MG tablet Take 30 mg by mouth 2 times daily        calcium-magnesium (CALMAG) 500-250 MG TABS per tablet Take 1 tablet by mouth daily       carboxymethylcellulose (CARBOXYMETHYLCELLULOSE SODIUM) 0.5 % SOLN ophthalmic solution Place 1 drop into both eyes 3 times daily as needed for dry eyes 15 mL 11     celecoxib (CELEBREX) 200 MG capsule Take 1 capsule (200 mg) by  mouth every morning 30 capsule 3     cyclobenzaprine (FLEXERIL) 10 MG tablet Take 1 tablet (10 mg) by mouth At Bedtime 90 tablet 1     DULoxetine (CYMBALTA) 60 MG capsule Take 120 mg by mouth At Bedtime        EPINEPHrine (EPIPEN/ADRENACLICK/OR ANY BX GENERIC EQUIV) 0.3 MG/0.3ML injection 2-pack Inject 0.3 mg into the muscle as needed       ferrous fumarate 65 mg, [place]. FE,-Vitamin C 125 mg (VITRON C)  MG TABS tablet Take 1 tablet by mouth daily       folic acid (FOLVITE) 1 MG tablet Take 1 tablet (1 mg) by mouth daily (Patient taking differently: Take 1 mg by mouth At Bedtime Skips Sunday nights) 90 tablet 3     lisinopril-hydrochlorothiazide (ZESTORETIC) 20-25 MG tablet Take 1 tablet by mouth daily 90 tablet 3     methotrexate sodium 2.5 MG TABS Take 10 tablets (25 mg) by mouth once a week MONDAYS 120 tablet 2     montelukast (SINGULAIR) 10 MG tablet Take 10 mg by mouth daily       omeprazole (PRILOSEC) 40 MG DR capsule Take 1 capsule (40 mg) by mouth daily 180 capsule 3     potassium 99 MG TABS Take 1 tablet by mouth daily       pregabalin (LYRICA) 150 MG capsule Take 1 capsule (150 mg) by mouth 2 times daily (Patient taking differently: Take 150 mg by mouth 2 times daily AM and around 1 PM) 60 capsule 3     rOPINIRole (REQUIP) 0.5 MG tablet Take 1 tablet (0.5 mg) by mouth At Bedtime Take 1 tab by mouth at bedtime. 90 tablet 1     SAXENDA 18 MG/3ML pen INJECT 3 MG SUBCUTANEOUS DAILY 9 mL 0     vitamin D3 (CHOLECALCIFEROL) 250 mcg (49638 units) capsule Take 1 capsule by mouth daily       vitamin E (TOCOPHEROL) 1000 units (450 mg) CAPS capsule Take 1,000 Units by mouth daily       zinc sulfate (ZINCATE) 220 (50 Zn) MG capsule Take 220 mg by mouth daily (with lunch)        capsaicin (ZOSTRIX) 0.025 % external cream Apply small amount to painful area up to three times daily (Patient not taking: Reported on 11/7/2022) 60 g 1     insulin pen needle (32G X 4 MM) 32G X 4 MM miscellaneous Use 1 NEEDLE WEEKLY 12 each  3     insulin pen needle (32G X 4 MM) 32G X 4 MM miscellaneous Use 1 NEEDLE daily to use with Saxenda 100 each 3     insulin pen needle (32G X 6 MM) 32G X 6 MM miscellaneous Use daily pen needles daily or as directed. 100 each 2     omeprazole (PRILOSEC) 40 MG DR capsule TAKE 1 CAPSULE BY MOUTH EVERY DAY 90 capsule 7     OXYGEN-HELIUM IN 3L @ night    Oxygen only not helium       Respiratory Therapy Supplies ([facility] CPAP FILTER) MISC          Allergies  Allergies   Allergen Reactions     Bee Venom Anaphylaxis     Doxycycline Anaphylaxis     Patient thinks it may have been just nausea and vomiting, however unable to confirm  Other reaction(s): throat closes     Erythromycin      Other reaction(s): Vomiting       Hydrocodone-Acetaminophen Itching       Social History  Social History     Socioeconomic History     Marital status: Single     Spouse name: Not on file     Number of children: Not on file     Years of education: Not on file     Highest education level: Not on file   Occupational History     Not on file   Tobacco Use     Smoking status: Former     Packs/day: 1.00     Years: 30.00     Pack years: 30.00     Types: Cigarettes     Start date: 1996     Quit date: 2021     Years since quittin.9     Smokeless tobacco: Never   Vaping Use     Vaping Use: Former   Substance and Sexual Activity     Alcohol use: No     Drug use: Yes     Types: Marijuana     Comment: very rarely      Sexual activity: Never   Other Topics Concern     Parent/sibling w/ CABG, MI or angioplasty before 65F 55M? Not Asked   Social History Narrative     Not on file     Social Determinants of Health     Financial Resource Strain: Not on file   Food Insecurity: Not on file   Transportation Needs: Not on file   Physical Activity: Not on file   Stress: Not on file   Social Connections: Not on file   Intimate Partner Violence: Not on file   Housing Stability: Not on file       Family History  Family History   Problem  Relation Age of Onset     Asthma Mother      Restless Leg Syndrome Mother      Other Cancer Father         base of tongue cancer at age ~65     Hypertension Father      Back Pain Father      Restless Leg Syndrome Father      Coronary Artery Disease Father      Restless Leg Syndrome Sister      Breast Cancer Maternal Aunt 55     Anesthesia Reaction No family hx of      Deep Vein Thrombosis (DVT) No family hx of      Thyroid Cancer No family hx of      Multiple endocrine neoplasia No family hx of        Review of Systems  The complete review of systems is negative other than noted in the HPI or here.     Anesthesia Evaluation   Pt has had prior anesthetic.     No history of anesthetic complications       ROS/MED HX  ENT/Pulmonary: Comment: Uses 3L O2 with CPAP at night    Uses albuterol daily (currently 1x/day). Frequency changes w/ seasons.    PFT 11/2020: Severe obstructive ventilatory defect with gas trapping and preserved diffusion capacity is most consistent with chronic obstructive asthma.    Followed by Jennifer Bonilla MD in pulmonology at [facility]. She was last seen on 1/7/22.    (+) sleep apnea, uses CPAP, tobacco use, Past use, Moderate Persistent, asthma Treatment: Inhaler daily,      Neurologic:     (+) peripheral neuropathy, - LEs, radiculopathy.  (-) no seizures and no CVA   Cardiovascular:     (+) hypertension-----    METS/Exercise Tolerance: 3 - Able to walk 1-2 blocks without stopping Comment: Has been walking one block daily over past month.    Hematologic:  - neg hematologic  ROS  (-) history of blood clots and history of blood transfusion   Musculoskeletal: Comment: Flatback syndrome of thoracolumbar region; Lumbar spondylosis; Spinal stenosis of lumbar region with neurogenic claudication; Lumbar radiculopathy.    S/p ACDF with plate fixation C3-C5 in 2017    S/p Laminectomies T11-L1 in 2019    Right shoulder pseudogout, finished prednisone one week ago for flare.    Rheumatoid arthritis, followed by  "Dr. Saenz. Taking methotrexate.        GI/Hepatic:     (+) GERD, Asymptomatic on medication,  (-) liver disease   Renal/Genitourinary:  - neg Renal ROS  (-) renal disease   Endo:     (+) Obesity,  (-) Type II DM   Psychiatric/Substance Use: Comment: Borderline personality    PTSD      (+) psychiatric history anxiety, depression and bipolar     Infectious Disease:  - neg infectious disease ROS     Malignancy:  - neg malignancy ROS     Other:      (+) , H/O Chronic Pain,        BP 99/69 (BP Location: Right arm, Patient Position: Sitting, Cuff Size: Adult Large)   Pulse 76   Temp 98.6  F (37  C) (Oral)   Resp 16   Ht 1.626 m (5' 4\")   Wt 89.4 kg (197 lb 3.2 oz)   LMP  (LMP Unknown)   SpO2 100%   Breastfeeding No   BMI 33.85 kg/m      Physical Exam  Constitutional: Awake, alert, cooperative, no apparent distress, and appears stated age.  Eyes: Pupils equal, round and reactive to light, extra ocular muscles intact, sclera clear, conjunctiva normal.  HENT: Normocephalic, oral pharynx with moist mucus membranes, good dentition. No goiter appreciated. No removable dental hardware.  Respiratory: Clear to auscultation bilaterally, no crackles or wheezing. No SOB when supine.  Cardiovascular: Regular rate and rhythm, normal S1 and S2, and no murmur noted.  Carotids +2, no bruits. No edema. Palpable pulses to radial, DP and PT arteries.   GI: Normal bowel sounds, soft, obese, non-tender, no masses palpated.    Lymph/Hematologic: No cervical lymphadenopathy and no supraclavicular lymphadenopathy.  Genitourinary:  deferred  Skin: Warm and dry.  No rashes.   Musculoskeletal: mildly limited extension ROM of neck. There is no redness, warmth, or swelling of the joints. Gross motor strength is normal.    Neurologic: Awake, alert, oriented to name, place and time. Cranial nerves II-XII are grossly intact. Gait is normal. Ambulates from chair to exam table, seats self, lies supine and sits back up w/o " assistance.  Neuropsychiatric: Calm, cooperative. Normal affect. Pleasant. Answers questions appropriately, follows commands w/o difficulty.        PRIOR LABS/DIAGNOSTIC STUDIES:    All labs and imaging personally reviewed      Thoracic and Lumbar spine MRI without contrast 9/19/22  Impression:   1. Multilevel thoracic spondylosis with left neuroforaminal stenosis  at T10-T11. No definite abnormality within the thoracic spinal cord or  vertebrae.   2. Moderate to severe spinal canal stenosis at the L2-L3 and L3-L4  secondary to focal lumbar spondylosis. Refer to FINDINGS for  additional characterization of these levels.  3. Moderate-to-severe neural foraminal stenosis on the right L2-L3 and  left L3-L4 and L4-L5.      Thoracic and Lumbar spine MRI without contrast 9/19/22  Impression:   1. Multilevel thoracic spondylosis with left neuroforaminal stenosis  at T10-T11. No definite abnormality within the thoracic spinal cord or  vertebrae.   2. Moderate to severe spinal canal stenosis at the L2-L3 and L3-L4  secondary to focal lumbar spondylosis. Refer to FINDINGS for  additional characterization of these levels.  3. Moderate-to-severe neural foraminal stenosis on the right L2-L3 and  left L3-L4 and L4-L5.    The patient's records and results personally reviewed by this provider.     Outside records reviewed from: Care Everywhere (provider notes @ [facility])    LAB/DIAGNOSTIC STUDIES TODAY:  BMP, CBC, T&S      WBC Count 4.0 - 11.0 10e3/uL 11.0  7.5  7.2  10.1  16.5 High   10.1  7.5      RBC Count 3.80 - 5.20 10e6/uL 4.01  3.73 Low   3.86  3.85  4.14  4.04  3.94     Hemoglobin 11.7 - 15.7 g/dL 13.1  12.0  12.0  12.2  13.1  12.6  12.5     Hematocrit 35.0 - 47.0 % 39.7  36.4  37.0  36.8  39.7  38.7  39.8     MCV 78 - 100 fL 99  98  96  96  96  96  101 High      MCH 26.5 - 33.0 pg 32.7  32.2  31.1  31.7  31.6  31.2  31.7     MCHC 31.5 - 36.5 g/dL 33.0  33.0  32.4  33.2  33.0  32.6  31.4 Low      RDW 10.0 - 15.0 % 14.7   15.3 High   14.2  14.5  14.4  15.3 High   15.1 High      Platelet Count 150 - 450 10e3/uL 292  304  308  285  338  279  326     % Neutrophils % 76     85       % Lymphocytes % 15     9       % Monocytes % 8     5       % Eosinophils % 1     1       % Basophils % 0     0       % Immature Granulocytes % 0     0       NRBCs per 100 WBC <1 /100 0     0       Absolute Neutrophils 1.6 - 8.3 10e3/uL 8.3     14.1 High        Absolute Lymphocytes 0.8 - 5.3 10e3/uL 1.6     1.4       Absolute Monocytes 0.0 - 1.3 10e3/uL 0.8     0.8       Absolute Eosinophils 0.0 - 0.7 10e3/uL 0.1     0.1       Absolute Basophils 0.0 - 0.2 10e3/uL 0.0     0.1       Absolute Immature Granulocytes <=0.4 10e3/uL 0.0     0.1       Absolute NRBCs 10e3/uL 0.0     0.0          Sodium 136 - 145 mmol/L 141  141    139 R  137 R  140 R      Potassium 3.4 - 5.3 mmol/L 3.8  4.1          Chloride 98 - 107 mmol/L 102  100          Carbon Dioxide (CO2) 22 - 29 mmol/L 28  30 High           Anion Gap 7 - 15 mmol/L 11  11    4 R  6 R  5 R     Urea Nitrogen 6.0 - 20.0 mg/dL 12.6  14.1          Creatinine 0.51 - 0.95 mg/dL 0.89  0.97 High   0.91 R  0.78 R  0.80 R  0.78 R  0.92 R     Calcium 8.6 - 10.0 mg/dL 9.5  9.6    8.2 Low  R  8.9 R  8.7 R     Glucose 70 - 99 mg/dL 91  91          GFR Estimate >60 mL/min/1.73m2 78  70 CM  76 CM  >90 CM  89 CM  >90               COVID testing scheduled on 11/28/22    Assessment      Zayra Ramirez is a 52 year old female seen as a PAC referral for risk assessment and optimization for anesthesia.    Plan/Recommendations  Pt will be optimized for the proposed procedure.  See below for details on the assessment, risk, and preoperative recommendations    NEUROLOGY  - No history of TIA, CVA or seizure    -Post Op delirium risk factors:  No risk identified    ENT  - No current airway concerns.  Will need to be reassessed day of surgery.  Mallampati: II  TM: > 3   - S/P C3-5 fusion, mildly limited extension ROM of neck    CARDIAC  -  "HTN, will hold lisinopril-hydrochlorothiazide DOS    - METS (Metabolic Equivalents)   Has been walking one block daily over past month.     Patient CANNOT perform 4 METS exercise without symptoms            Total Score: 1    Functional Capacity: Unable to complete 4 METS      RCRI-Low risk: Class 2 0.9% complication rate            Total Score: 1    RCRI: High Risk Surgery        PULMONARY  - ROBERT with CPAP. Uses 3L O2 with CPAP at night       - Moderate persistent asthma. Uses albuterol daily (currently 1x/day). Frequency changes w/ seasons.  - Followed by Jennifer Bonilla MD in pulmonology at [facility]. She was last seen on 1/7/22.  - PFT 11/2020: Severe obstructive ventilatory defect with gas trapping and preserved diffusion capacity is most consistent with chronic obstructive asthma.  - Duoneb ordered for DOS.    - Tobacco History      History   Smoking Status     Former     Packs/day: 1.00     Years: 30.00     Types: Cigarettes     Start date: 1/1/1996     Quit date: 11/14/2021   Smokeless Tobacco     Never       GI  - GERD, asymptomatic on prilosec  PONV High Risk  Total Score: 3           1 AN PONV: Pt is Female    1 AN PONV: Patient is not a current smoker    1 AN PONV: Intended Post Op Opioids          ENDOCRINE    - BMI: Estimated body mass index is 33.85 kg/m  as calculated from the following:    Height as of this encounter: 1.626 m (5' 4\").    Weight as of this encounter: 89.4 kg (197 lb 3.2 oz).  Obesity (BMI >30)  - No history of Diabetes Mellitus    HEME  VTE Low Risk 0.26%            Total Score: 0      - No history of abnormal bleeding or antiplatelet use.      MSK  Patient is NOT Frail            Total Score: 0      - Flatback syndrome of thoracolumbar region; Lumbar spondylosis; Spinal stenosis of lumbar region with neurogenic claudication; Lumbar radiculopathy.  - S/p ACDF with plate fixation C3-C5 in 2017  - S/p Laminectomies T11-L1 in 2019  - Right shoulder pseudogout, finished prednisone one week ago " for flare.  - Rheumatoid arthritis, followed by Dr. Saenz. Taking methotrexate (last dose 11/14/22).  - Recommend careful positioning throughout the perioperative period to prevent injury or exacerbation of pain.        PSYCH  - Anxiety, depression, bipolar  - Borderline personality  - PTSD      The patient is optimized for their procedure. AVS with information on surgery time/arrival time, meds and NPO status given by nursing staff. No further diagnostic testing indicated.    Final plan per anesthesiologist on day of surgery.        On the day of service:     Prep time: 18 minutes  Visit time: 25 minutes  Documentation time: 16 minutes  ------------------------------------------  Total time: 59 minutes      Mai Sotomayor PA-C  Preoperative Assessment Center  [facility]  Clinic and [facility] Center  Phone: [phone]  Fax: [phone]

## 2022-11-08 NOTE — TELEPHONE ENCOUNTER
See My Chart preop questions from pt.  I called her back & again reviewed discharge planning process in hospital with PT safety eval. &  & answered all her questions.    I again reviewed preop teaching & packet & answered all questions.  Call back prn. Pt agreed.   Rosy Hunter RN.

## 2022-11-09 ENCOUNTER — VIRTUAL VISIT (OUTPATIENT)
Dept: SURGERY | Facility: CLINIC | Age: 52
End: 2022-11-09
Payer: COMMERCIAL

## 2022-11-09 DIAGNOSIS — Z71.3 NUTRITIONAL COUNSELING: ICD-10-CM

## 2022-11-09 DIAGNOSIS — E66.811 CLASS 1 OBESITY DUE TO EXCESS CALORIES WITHOUT SERIOUS COMORBIDITY WITH BODY MASS INDEX (BMI) OF 33.0 TO 33.9 IN ADULT: Primary | ICD-10-CM

## 2022-11-09 DIAGNOSIS — E66.09 CLASS 1 OBESITY DUE TO EXCESS CALORIES WITHOUT SERIOUS COMORBIDITY WITH BODY MASS INDEX (BMI) OF 33.0 TO 33.9 IN ADULT: Primary | ICD-10-CM

## 2022-11-09 PROCEDURE — 97803 MED NUTRITION INDIV SUBSEQ: CPT | Mod: 95 | Performed by: DIETITIAN, REGISTERED

## 2022-11-09 NOTE — LETTER
11/9/2022         RE: Zayra Ramirez  [address] [place] [address]        Dear Colleague,    Thank you for referring your patient, Zayra Ramirez, to the [facility] [place]. Please see a copy of my visit note below.    Zayra Ramirez is a 52 year old who is being evaluated via a billable video visit.      How would you like to obtain your AVS? MyChart  If the video visit is dropped, the invitation should be resent by: Send to e-mail at: [email or web address]  Will anyone else be joining your video visit? No        Medical  Weight Loss Follow-Up Diet Evaluation  Assessment:  Zayra is presenting today for a follow up weight management nutrition consultation. Pt has had an initial appointment with Dr. Hong.  Weight loss medication: Saxenda.   Pt's weight is 196 lbs  Initial weight: 219 lbs  Weight change: 23 lbs (down)    No flowsheet data found.  BMI: There is no height or weight on file to calculate BMI.  Ideal body weight: 54.7 kg (120 lb 9.5 oz)  Adjusted ideal body weight: 68.6 kg (151 lb 3.8 oz)    Estimated RMR (Bailey-St Jeor equation):   1493 kcals x 1.3 (light active) = 1941 kcals (for weight maintenance)     Recommended Protein Intake: 60-80 grams of protein/day  Patient Active Problem List:  Patient Active Problem List   Diagnosis     Lumbago     Facial cellulitis     Class 2 severe obesity with serious comorbidity and body mass index (BMI) of 35.0 to 35.9 in adult, unspecified obesity type (H)     Dental abscess     Hypoxia     Subcutaneous emphysema (H)     Borderline personality disorder (H)     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)     Esophageal stricture     Obstruction of esophagus     HTN (hypertension)     Interstitial lung disease (H)     Moderate persistent asthma without complication     Onychomycosis     Restless leg syndrome     Seasonal allergies     Stress     Respiratory bronchiolitis interstitial lung  disease (H)     Bilateral shoulder pain     Spinal epidural abscess     Lumbar spondylosis     Inflammatory arthritis     Arthralgia of temporomandibular joint     Articular disc disorder of temporomandibular joint     Derangement of temporomandibular joint     Calculus of gallbladder without cholecystitis without obstruction     Displacement of articular disc of temporomandibular joint with reduction     Myofascial pain     Oral lesion     Symptomatic cholelithiasis     Sacro-iliac pain     Degenerative lumbar spinal stenosis     Chronic lumbar radiculopathy     Glucose intolerance     Chronic neck pain     Lumbar radiculopathy, chronic     Cervical radiculopathy     Acute pain of right shoulder     Other osteoporosis without current pathological fracture     Diaphragmatic hernia     Bipolar 2 disorder (H)     Choking sensation     Gastro-esophageal reflux disease with esophagitis     Limited opening of mandible     History of pelvic surgery     Voiding dysfunction     Pelvic floor dysfunction     Urinary hesitancy     Urinary frequency     Diabetes: No     Progress on goals from last visit: Feels that things are going well in regards to nutrition, noting that she has been making healthy food choices into her diet.  Patient reports that she continues to focus on protein first.  Patient reports that she is trying to be consistent with eating 3 meals/day, still struggling at breakfast.  Patient reports that she continues to choose healthy protein based snacks such as nuts or cheese.     Beverages: Water 4-6 bottles/day, Zero Sugar Mt Dew, occasional Coffee/Cappucino  Exercise: walking around the block more frequently (up to 2 blocks); PT exercises    Nutrition Diagnosis:    Overweight/Obesity (NC 3.3) related to overeating and poor lifestyle habits as evidenced by patient's subjective statements (h/o excessive energy intake, lack of exercise) and BMI of 33.7 kg/m2.     Intervention:  1. Food and/or nutrient delivery:  balanced meals, adequate protein   2. Nutrition education: vegetable consumption  3. Nutrition counseling: praised patient for positive changes made      Monitoring/Evaluation:    Goals:  1. Continue to focus on protein first at meals.   2. Incorporate vegetables into lunch and supper for balance of adequate fiber and nutrients.    Patient to follow up in 1 month(s) with bariatrician and 2 month(s) with RD      Video-Visit Details    Type of service:  Video Visit    Video Start Time (time video started): 1:00 pm    Video End Time (time video stopped): 1:09 pm    Originating Location (pt. Location): Home        Distant Location (provider location):  Off-site    Mode of Communication:  Video Conference via [facility]    Physician has received verbal consent for a Video Visit from the patient? Yes      Saumya Dobbins RD            Again, thank you for allowing me to participate in the care of your patient.        Sincerely,        Saumya Dobbins RD

## 2022-11-09 NOTE — PROGRESS NOTES
Zayra Ramirez is a 52 year old who is being evaluated via a billable video visit.      How would you like to obtain your AVS? MyChart  If the video visit is dropped, the invitation should be resent by: Send to e-mail at: [email or web address]  Will anyone else be joining your video visit? No        Medical  Weight Loss Follow-Up Diet Evaluation  Assessment:  Zayra is presenting today for a follow up weight management nutrition consultation. Pt has had an initial appointment with Dr. Hong.  Weight loss medication: Saxenda.   Pt's weight is 196 lbs  Initial weight: 219 lbs  Weight change: 23 lbs (down)    No flowsheet data found.  BMI: There is no height or weight on file to calculate BMI.  Ideal body weight: 54.7 kg (120 lb 9.5 oz)  Adjusted ideal body weight: 68.6 kg (151 lb 3.8 oz)    Estimated RMR (Trinity-St Jeor equation):   1493 kcals x 1.3 (light active) = 1941 kcals (for weight maintenance)     Recommended Protein Intake: 60-80 grams of protein/day  Patient Active Problem List:  Patient Active Problem List   Diagnosis     Lumbago     Facial cellulitis     Class 2 severe obesity with serious comorbidity and body mass index (BMI) of 35.0 to 35.9 in adult, unspecified obesity type (H)     Dental abscess     Hypoxia     Subcutaneous emphysema (H)     Borderline personality disorder (H)     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)     Esophageal stricture     Obstruction of esophagus     HTN (hypertension)     Interstitial lung disease (H)     Moderate persistent asthma without complication     Onychomycosis     Restless leg syndrome     Seasonal allergies     Stress     Respiratory bronchiolitis interstitial lung disease (H)     Bilateral shoulder pain     Spinal epidural abscess     Lumbar spondylosis     Inflammatory arthritis     Arthralgia of temporomandibular joint     Articular disc disorder of temporomandibular joint     Derangement of temporomandibular joint     Calculus of gallbladder  without cholecystitis without obstruction     Displacement of articular disc of temporomandibular joint with reduction     Myofascial pain     Oral lesion     Symptomatic cholelithiasis     Sacro-iliac pain     Degenerative lumbar spinal stenosis     Chronic lumbar radiculopathy     Glucose intolerance     Chronic neck pain     Lumbar radiculopathy, chronic     Cervical radiculopathy     Acute pain of right shoulder     Other osteoporosis without current pathological fracture     Diaphragmatic hernia     Bipolar 2 disorder (H)     Choking sensation     Gastro-esophageal reflux disease with esophagitis     Limited opening of mandible     History of pelvic surgery     Voiding dysfunction     Pelvic floor dysfunction     Urinary hesitancy     Urinary frequency     Diabetes: No     Progress on goals from last visit: Feels that things are going well in regards to nutrition, noting that she has been making healthy food choices into her diet.  Patient reports that she continues to focus on protein first.  Patient reports that she is trying to be consistent with eating 3 meals/day, still struggling at breakfast.  Patient reports that she continues to choose healthy protein based snacks such as nuts or cheese.     Beverages: Water 4-6 bottles/day, Zero Sugar Mt Dew, occasional Coffee/Cappucino  Exercise: walking around the block more frequently (up to 2 blocks); PT exercises    Nutrition Diagnosis:    Overweight/Obesity (NC 3.3) related to overeating and poor lifestyle habits as evidenced by patient's subjective statements (h/o excessive energy intake, lack of exercise) and BMI of 33.7 kg/m2.     Intervention:  1. Food and/or nutrient delivery: balanced meals, adequate protein   2. Nutrition education: vegetable consumption  3. Nutrition counseling: praised patient for positive changes made      Monitoring/Evaluation:    Goals:  1. Continue to focus on protein first at meals.   2. Incorporate vegetables into lunch and supper  for balance of adequate fiber and nutrients.    Patient to follow up in 1 month(s) with bariatrician and 2 month(s) with RD      Video-Visit Details    Type of service:  Video Visit    Video Start Time (time video started): 1:00 pm    Video End Time (time video stopped): 1:09 pm    Originating Location (pt. Location): Home        Distant Location (provider location):  Off-site    Mode of Communication:  Video Conference via [facility]    Physician has received verbal consent for a Video Visit from the patient? Yes      Saumya Dobbins, DANIEL

## 2022-11-15 ENCOUNTER — MYC MEDICAL ADVICE (OUTPATIENT)
Dept: RHEUMATOLOGY | Facility: CLINIC | Age: 52
End: 2022-11-15

## 2022-11-16 NOTE — TELEPHONE ENCOUNTER
In my opinion, methotrexate could be restarted safely within 2 weeks of spinal surgery. I defer to Dr. Wilhelm for advising you on when to restart the drug, however. While waiting to restart methotrexate, another medication that may be useful for control of inflammatory arthritis is hydroxychloroquine.  I do not expect hydroxychloroquine to have any adverse effects on infection susceptibility or wound healing.  Again I would defer to Dr. Krishnan if he has a different view of its safety.  I recommend 400 mg once daily; the medication could be started safely even before the surgery, and continued through the perioperative period, again subject to approval from Dr. Krishnan.

## 2022-11-16 NOTE — TELEPHONE ENCOUNTER
She is not a candidate for bisphosphate given oral surgery and risk for osteonecrosis of the jaw.  I can not prescribe a bisphonates for her with this risk.  This is documented in my note.  How do we proceed?

## 2022-11-16 NOTE — TELEPHONE ENCOUNTER
Will route to Dr. Saenz for review.    Yandy Orlando, BSN, RN  RN Care Coordinator Rheumatology

## 2022-11-18 ENCOUNTER — TELEPHONE (OUTPATIENT)
Dept: RHEUMATOLOGY | Facility: CLINIC | Age: 52
End: 2022-11-18

## 2022-11-18 DIAGNOSIS — Z87.39 HISTORY OF SERONEGATIVE INFLAMMATORY ARTHRITIS: Primary | ICD-10-CM

## 2022-11-18 RX ORDER — HYDROXYCHLOROQUINE SULFATE 200 MG/1
200 TABLET, FILM COATED ORAL 2 TIMES DAILY
Qty: 60 TABLET | Refills: 4 | Status: SHIPPED | OUTPATIENT
Start: 2022-11-18 | End: 2023-03-02

## 2022-11-18 NOTE — TELEPHONE ENCOUNTER
"Call to patient to discuss recommendations from Dr. Saenz and approval from Dr. Wilhelm.   Patient is asking to start Hydroxychloroquine, will route message to Dr. Saenz and follow-up with patient next week.  All questions answered.    Reviewed with patient- message from Dr. Wilhelm-  Re his rheumatology meds:  - I am ok with resuming methotrexate at 2 wks postop, pending good wound healing.  I also have no objections to her taking hydroxychloroquine while off methotrexate.  - Agree with holding off celebrex beginning 7-10 days preoperative.     Reviewed message from Dr. Saenz-  \"In my opinion, methotrexate could be restarted safely within 2 weeks of spinal surgery. I defer to Dr. Wilhelm for advising you on when to restart the drug, however. While waiting to restart methotrexate, another medication that may be useful for control of inflammatory arthritis is hydroxychloroquine.  I do not expect hydroxychloroquine to have any adverse effects on infection susceptibility or wound healing.  Again I would defer to Dr. Wilhelm if he has a different view of its safety.  I recommend 400 mg once daily; the medication could be started safely even before the surgery, and continued through the perioperative period, again subject to approval from Dr. Wilhelm.\"  "

## 2022-11-18 NOTE — TELEPHONE ENCOUNTER
"----- Message from Yandy Orlando RN sent at 11/18/2022  4:20 PM CST -----  Regarding: RE: Rheumatology Recommendations for Surgery 11/30  Hi Dr. Saenz,  I just spoke with the patient.  She would like to start the Hydroxychloroquine as you recommended. Her last dose of methotrexate was on 11/14.  Based  on the message below, Dr. Wilhelm is fine with it.    I will let you decide if you want to order it for her and follow-up with her on Monday.    Thank you- Yandy  ----- Message -----  From: Philip Wilhelm MD  Sent: 11/17/2022  10:57 AM CST  To: Panchito Saenz MD, Rosy Hunter, GREY, #  Subject: RE: Rheumatology Recommendations for Surgery#    Hi Yandy,    Thanks for reaching out, and especially for including in your message Dr. Saenz's recommendations.    Re his rheumatology meds:  - I am ok with resuming methotrexate at 2 wks postop, pending good wound healing.  I also have no objections to her taking hydroxychloroquine while off methotrexate.  - Agree with holding off celebrex beginning 7-10 days preoperative.    Thanks,  Philip    ----- Message -----  From: Yandy Orlando RN  Sent: 11/16/2022   9:44 AM CST  To: Philip Wilhelm MD, Rosy Hunter, RN  Subject: Rheumatology Recommendations for Surgery 11/#    Good Morning Dr. Wilhelm and Kevin Iverson reached out to us today in preparation for her spine surgery on 11/30. Patient wanted a plan from Dr. Saenz for managing her inflammatory arthritis.    She took her last dose of Methotrexate on 11/14.     This is Dr. Saenz's recommendations that I will share with the patient-    \"In my opinion, methotrexate could be restarted safely within 2 weeks of spinal surgery. I defer to Dr. Wilhelm for advising you on when to restart the drug, however. While waiting to restart methotrexate, another medication that may be useful for control of inflammatory arthritis is hydroxychloroquine.  I do not expect hydroxychloroquine to have any " "adverse effects on infection susceptibility or wound healing.  Again I would defer to Dr. Wilhelm if he has a different view of its safety.  I recommend 400 mg once daily; the medication could be started safely even before the surgery, and continued through the perioperative period, again subject to approval from Dr. Wilhelm.\"    Dr. Wilhelm and Rosy- please advise.    The patient is still on Celebrex also, I believe- I asked her to reach out to your team regarding when and if to hold it preop.    Thank you-  Miss you guys!    Yandy Orlando, BSN, RN  RN Care Coordinator Rheumatology              "

## 2022-11-28 ENCOUNTER — ANESTHESIA EVENT (OUTPATIENT)
Dept: SURGERY | Facility: CLINIC | Age: 52
DRG: 454 | End: 2022-11-28
Payer: COMMERCIAL

## 2022-11-28 ENCOUNTER — LAB (OUTPATIENT)
Dept: LAB | Facility: CLINIC | Age: 52
End: 2022-11-28
Payer: COMMERCIAL

## 2022-11-28 DIAGNOSIS — Z11.59 ENCOUNTER FOR SCREENING FOR OTHER VIRAL DISEASES: ICD-10-CM

## 2022-11-28 PROCEDURE — U0005 INFEC AGEN DETEC AMPLI PROBE: HCPCS

## 2022-11-28 PROCEDURE — U0003 INFECTIOUS AGENT DETECTION BY NUCLEIC ACID (DNA OR RNA); SEVERE ACUTE RESPIRATORY SYNDROME CORONAVIRUS 2 (SARS-COV-2) (CORONAVIRUS DISEASE [COVID-19]), AMPLIFIED PROBE TECHNIQUE, MAKING USE OF HIGH THROUGHPUT TECHNOLOGIES AS DESCRIBED BY CMS-2020-01-R: HCPCS

## 2022-11-29 ENCOUNTER — VIRTUAL VISIT (OUTPATIENT)
Dept: FAMILY MEDICINE | Facility: CLINIC | Age: 52
End: 2022-11-29
Payer: COMMERCIAL

## 2022-11-29 DIAGNOSIS — Z11.4 SCREENING FOR HIV (HUMAN IMMUNODEFICIENCY VIRUS): ICD-10-CM

## 2022-11-29 DIAGNOSIS — E66.01 CLASS 2 SEVERE OBESITY WITH SERIOUS COMORBIDITY AND BODY MASS INDEX (BMI) OF 35.0 TO 35.9 IN ADULT, UNSPECIFIED OBESITY TYPE (H): ICD-10-CM

## 2022-11-29 DIAGNOSIS — Z79.899 ENCOUNTER FOR LONG-TERM (CURRENT) USE OF MEDICATIONS: ICD-10-CM

## 2022-11-29 DIAGNOSIS — K21.00 GASTROESOPHAGEAL REFLUX DISEASE WITH ESOPHAGITIS, UNSPECIFIED WHETHER HEMORRHAGE: ICD-10-CM

## 2022-11-29 DIAGNOSIS — Z11.59 NEED FOR HEPATITIS C SCREENING TEST: ICD-10-CM

## 2022-11-29 DIAGNOSIS — I10 HYPERTENSION, UNSPECIFIED TYPE: Primary | ICD-10-CM

## 2022-11-29 DIAGNOSIS — M48.061 DEGENERATIVE LUMBAR SPINAL STENOSIS: ICD-10-CM

## 2022-11-29 DIAGNOSIS — E66.812 CLASS 2 SEVERE OBESITY WITH SERIOUS COMORBIDITY AND BODY MASS INDEX (BMI) OF 35.0 TO 35.9 IN ADULT, UNSPECIFIED OBESITY TYPE (H): ICD-10-CM

## 2022-11-29 LAB — SARS-COV-2 RNA RESP QL NAA+PROBE: NEGATIVE

## 2022-11-29 PROCEDURE — 99214 OFFICE O/P EST MOD 30 MIN: CPT | Mod: 95 | Performed by: FAMILY MEDICINE

## 2022-11-29 RX ORDER — LIRAGLUTIDE 6 MG/ML
INJECTION, SOLUTION SUBCUTANEOUS
Qty: 9 ML | Refills: 0 | Status: ON HOLD | OUTPATIENT
Start: 2022-11-29 | End: 2022-12-06

## 2022-11-29 NOTE — ASSESSMENT & PLAN NOTE
BMI IMPROVING Body mass index is 37.59 kg/m , now down to 35.70 with Saxenda 3.0mg     SEVERE OBESITY : Initial bmi is Body mass index is 37.59 kg/m .  With the following weight related comorbid medical conditions: Hypertension, asthma, GERD, gallstones, degenerative spinal stenosis (surgeon suggested weight loss followed by surgery) and chronic back pain.  As well as she is a smoker.  And has mental illness     Contraception - had hysterectomy in 1997.      Patient declined 24 week weight loss program due to cost.      Bariatric surgery was discussed but she is not interested and she has severe mental health issues which may make this very difficult.     Medications started today: saxenda 3mg    Current goal(s): trying to get rid of mountain dew. Also considering tracking diet      Follow up 1 month.      DISCUSSION _________________________________________________________________     Dx: Initial bmi is Body mass index is 37.59 kg/m .  With the following weight related comorbid medical conditions: Hypertension, asthma, GERD, gallstones, degenerative spinal stenosis (surgeon suggested weight loss followed by surgery) and chronic back pain.  As well as she is a smoker.  And has mental illness     BECAUSE OF ABOVE PROBLEMS IT IS STRONGLY ADVISED THAT Zayra LOSE WEIGHT.  BELOW IS MY PLAN FOR her      Start weight 219 lbs, Body mass index is 37.59 kg/m .  7/26/2022   195 lbs 11/29/2022     Medication notes:   Medications she takes, that are known to increase weight:abilify, zyrtec, cymbalta, methotrexate, lyrica and flexeril.   Medications she takes, known to decrease weight: none     SAXENDA  7/26/2022  - 11/29/2022  - now taking 3 mg.     Metformin  -contraindicated due to A1c of 5.3 July 2022  Wellbutrin - Contraindicated due to psych polypharmacy  Naltrexone contraindicated due to chronic pain  Contrave -contraindicated due to psychiatric polypharmacy and chronic pain  Phentermine/ diethylproprion   -contraindicated due to psychiatric polypharmacy  Topamax - could consider if saxenda not covered.   Qsymia contraindicated due to psychiatric poly pharmacy.   Orlistat could consider.   Plenity could consider.      Potential barriers to weight loss identified thus far:   -She has suffered from excess weight since her teenage years.  -Mental health issues have played a role  -Weight positive medications have played a role  -Quitting smoking has played a role in her excess weight  -Eating the wrong types of food  -Lack of exercise - due to back pain  -Chronic back pain -keeps her from being more active  - Genetics, mother and father both overweight  -She is disabled.  -Uses white carbohydrates  -Eats most of her food at the end of the day  -She has had to eat at the food shelf at least a few times this year  -Craves sweets, candy, chocolate, cheeses  -Drinks Mountain Dew but trying to cut down - 9/6/2022 switched from regular mt dew zero.       Strengths that may help weight loss:  -She is a   -Likes vegetables  -Drinks water

## 2022-11-29 NOTE — PROGRESS NOTES
Zayra is a 52 year old who is being evaluated via a billable video visit.      How would you like to obtain your AVS? MyChart  If the video visit is dropped, the invitation should be resent by: Text to cell phone: [phone]  Will anyone else be joining your video visit? No      Video-Visit Details    Video Start Time: 5:38 PM    Type of service:  Video Visit    Video End Time:5:49 PM    Originating Location (pt. Location): Home    Distant Location (provider location):  On-site    Platform used for Video Visit: NodePing    Problem List Items Addressed This Visit        Digestive    Class 2 severe obesity with serious comorbidity and body mass index (BMI) of 35.0 to 35.9 in adult, unspecified obesity type (H)     BMI IMPROVING Body mass index is 37.59 kg/m , now down to 35.70 with Saxenda 3.0mg     SEVERE OBESITY : Initial bmi is Body mass index is 37.59 kg/m .  With the following weight related comorbid medical conditions: Hypertension, asthma, GERD, gallstones, degenerative spinal stenosis (surgeon suggested weight loss followed by surgery) and chronic back pain.  As well as she is a smoker.  And has mental illness     Contraception - had hysterectomy in 1997.      Patient declined 24 week weight loss program due to cost.      Bariatric surgery was discussed but she is not interested and she has severe mental health issues which may make this very difficult.     Medications started today: saxenda 3mg    Current goal(s): trying to get rid of mountain dew. Also considering tracking diet      Follow up 1 month.      DISCUSSION _________________________________________________________________     Dx: Initial bmi is Body mass index is 37.59 kg/m .  With the following weight related comorbid medical conditions: Hypertension, asthma, GERD, gallstones, degenerative spinal stenosis (surgeon suggested weight loss followed by surgery) and chronic back pain.  As well as she is a smoker.  And has mental illness     BECAUSE OF  ABOVE PROBLEMS IT IS STRONGLY ADVISED THAT Zayra LOSE WEIGHT.  BELOW IS MY PLAN FOR her      Start weight 219 lbs, Body mass index is 37.59 kg/m .  7/26/2022   195 lbs 11/29/2022     Medication notes:   Medications she takes, that are known to increase weight:abilify, zyrtec, cymbalta, methotrexate, lyrica and flexeril.   Medications she takes, known to decrease weight: none     SAXENDA  7/26/2022  - 11/29/2022  - now taking 3 mg.     Metformin  -contraindicated due to A1c of 5.3 July 2022  Wellbutrin - Contraindicated due to psych polypharmacy  Naltrexone contraindicated due to chronic pain  Contrave -contraindicated due to psychiatric polypharmacy and chronic pain  Phentermine/ diethylproprion  -contraindicated due to psychiatric polypharmacy  Topamax - could consider if saxenda not covered.   Qsymia contraindicated due to psychiatric poly pharmacy.   Orlistat could consider.   Plenity could consider.      Potential barriers to weight loss identified thus far:   -She has suffered from excess weight since her teenage years.  -Mental health issues have played a role  -Weight positive medications have played a role  -Quitting smoking has played a role in her excess weight  -Eating the wrong types of food  -Lack of exercise - due to back pain  -Chronic back pain -keeps her from being more active  - Genetics, mother and father both overweight  -She is disabled.  -Uses white carbohydrates  -Eats most of her food at the end of the day  -She has had to eat at the food shelf at least a few times this year  -Craves sweets, candy, chocolate, cheeses  -Drinks Mountain Dew but trying to cut down - 9/6/2022 switched from regular mt dew zero.       Strengths that may help weight loss:  -She is a   -Likes vegetables  -Drinks water         Relevant Medications    liraglutide - Weight Management (SAXENDA) 18 MG/3ML pen    Gastro-esophageal reflux disease with esophagitis       Circulatory    HTN (hypertension) - Primary  "      Other    Degenerative lumbar spinal stenosis   Other Visit Diagnoses     Encounter for long-term (current) use of medications        Screening for HIV (human immunodeficiency virus)        Need for hepatitis C screening test             Follow-up Visit   Expected date:  Dec 29, 2022 (Approximate)      Follow Up Appointment Details:     Follow-up with whom?: Me    Follow-Up for what?: Chronic Disease f/u    Chronic Disease f/u: General (Other)    Additional Details: weight loss follow up    How?: In Person or Virtual    Is this an as-needed follow-up?: No                   Subjective   Zayra is a 52 year old who presents for the following health issues   Chief Complaint   Patient presents with     Weight Loss            Objective    Vitals - Patient Reported  Weight (Patient Reported): 88.5 kg (195 lb)  Height (Patient Reported): 162.6 cm (5' 4\")      Vitals:  No vitals were obtained today due to virtual visit.    Physical Exam  Constitutional:       Appearance: Normal appearance.   HENT:      Head: Normocephalic and atraumatic.   Cardiovascular:      Rate and Rhythm: Normal rate and regular rhythm.   Pulmonary:      Effort: Pulmonary effort is normal.   Musculoskeletal:         General: Normal range of motion.      Cervical back: Normal range of motion and neck supple.   Neurological:      General: No focal deficit present.      Mental Status: She is alert and oriented to person, place, and time.          This note has been dictated using voice recognition software. Any grammatical or context distortions are unintentional and inherent to the software      "

## 2022-11-30 ENCOUNTER — HOSPITAL ENCOUNTER (INPATIENT)
Facility: CLINIC | Age: 52
LOS: 6 days | Discharge: HOME HEALTH | DRG: 454 | End: 2022-12-06
Attending: ORTHOPAEDIC SURGERY | Admitting: ORTHOPAEDIC SURGERY
Payer: COMMERCIAL

## 2022-11-30 ENCOUNTER — APPOINTMENT (OUTPATIENT)
Dept: GENERAL RADIOLOGY | Facility: CLINIC | Age: 52
DRG: 454 | End: 2022-11-30
Attending: ORTHOPAEDIC SURGERY
Payer: COMMERCIAL

## 2022-11-30 ENCOUNTER — ANESTHESIA (OUTPATIENT)
Dept: SURGERY | Facility: CLINIC | Age: 52
DRG: 454 | End: 2022-11-30
Payer: COMMERCIAL

## 2022-11-30 DIAGNOSIS — M48.02 STENOSIS OF CERVICAL SPINE WITH MYELOPATHY (H): Primary | ICD-10-CM

## 2022-11-30 DIAGNOSIS — G99.2 STENOSIS OF CERVICAL SPINE WITH MYELOPATHY (H): Primary | ICD-10-CM

## 2022-11-30 DIAGNOSIS — Z87.39 HISTORY OF SERONEGATIVE INFLAMMATORY ARTHRITIS: ICD-10-CM

## 2022-11-30 DIAGNOSIS — G25.81 RESTLESS LEG SYNDROME: ICD-10-CM

## 2022-11-30 DIAGNOSIS — Z98.1 STATUS POST THORACIC SPINAL FUSION: ICD-10-CM

## 2022-11-30 LAB
ABO/RH(D): NORMAL
ALBUMIN SERPL-MCNC: 3.1 G/DL (ref 3.4–5)
ALP SERPL-CCNC: 57 U/L (ref 40–150)
ALT SERPL W P-5'-P-CCNC: 57 U/L (ref 0–50)
ANION GAP SERPL CALCULATED.3IONS-SCNC: 7 MMOL/L (ref 3–14)
ANTIBODY SCREEN: NEGATIVE
AST SERPL W P-5'-P-CCNC: 101 U/L (ref 0–45)
BASE EXCESS BLDA CALC-SCNC: -0.2 MMOL/L (ref -9.6–2)
BASE EXCESS BLDA CALC-SCNC: 1.3 MMOL/L (ref -9.6–2)
BASE EXCESS BLDA CALC-SCNC: 1.5 MMOL/L (ref -9.6–2)
BASE EXCESS BLDA CALC-SCNC: 3.9 MMOL/L (ref -9.6–2)
BILIRUB SERPL-MCNC: 0.3 MG/DL (ref 0.2–1.3)
BUN SERPL-MCNC: 12 MG/DL (ref 7–30)
CA-I BLD-MCNC: 4.2 MG/DL (ref 4.4–5.2)
CA-I BLD-MCNC: 4.2 MG/DL (ref 4.4–5.2)
CA-I BLD-MCNC: 4.3 MG/DL (ref 4.4–5.2)
CA-I BLD-MCNC: 4.3 MG/DL (ref 4.4–5.2)
CA-I BLD-MCNC: 4.6 MG/DL (ref 4.4–5.2)
CALCIUM SERPL-MCNC: 7.6 MG/DL (ref 8.5–10.1)
CHLORIDE BLD-SCNC: 105 MMOL/L (ref 94–109)
CO2 SERPL-SCNC: 26 MMOL/L (ref 20–32)
CREAT SERPL-MCNC: 0.84 MG/DL (ref 0.52–1.04)
ERYTHROCYTE [DISTWIDTH] IN BLOOD BY AUTOMATED COUNT: 13.8 % (ref 10–15)
GFR SERPL CREATININE-BSD FRML MDRD: 84 ML/MIN/1.73M2
GLUCOSE BLD-MCNC: 105 MG/DL (ref 70–99)
GLUCOSE BLD-MCNC: 106 MG/DL (ref 70–99)
GLUCOSE BLD-MCNC: 126 MG/DL (ref 70–99)
GLUCOSE BLD-MCNC: 129 MG/DL (ref 70–99)
GLUCOSE BLD-MCNC: 142 MG/DL (ref 70–99)
GLUCOSE BLDC GLUCOMTR-MCNC: 113 MG/DL (ref 70–99)
GLUCOSE BLDC GLUCOMTR-MCNC: 161 MG/DL (ref 70–99)
HCO3 BLDA-SCNC: 26 MMOL/L (ref 21–28)
HCO3 BLDA-SCNC: 27 MMOL/L (ref 21–28)
HCO3 BLDA-SCNC: 28 MMOL/L (ref 21–28)
HCO3 BLDA-SCNC: 29 MMOL/L (ref 21–28)
HCT VFR BLD AUTO: 33 % (ref 35–47)
HGB BLD-MCNC: 10.7 G/DL (ref 11.7–15.7)
HGB BLD-MCNC: 11 G/DL (ref 11.7–15.7)
HGB BLD-MCNC: 11.3 G/DL (ref 11.7–15.7)
HGB BLD-MCNC: 12.1 G/DL (ref 11.7–15.7)
HGB BLD-MCNC: 9.9 G/DL (ref 11.7–15.7)
HOLD SPECIMEN: NORMAL
LACTATE BLD-SCNC: 0.9 MMOL/L
LACTATE BLD-SCNC: 1 MMOL/L
LACTATE BLD-SCNC: 1.6 MMOL/L
LACTATE BLD-SCNC: 1.9 MMOL/L
MAGNESIUM SERPL-MCNC: 2.2 MG/DL (ref 1.6–2.3)
MCH RBC QN AUTO: 32.4 PG (ref 26.5–33)
MCHC RBC AUTO-ENTMCNC: 34.2 G/DL (ref 31.5–36.5)
MCV RBC AUTO: 95 FL (ref 78–100)
O2/TOTAL GAS SETTING VFR VENT: 35 %
O2/TOTAL GAS SETTING VFR VENT: 50 %
O2/TOTAL GAS SETTING VFR VENT: 60 %
O2/TOTAL GAS SETTING VFR VENT: 60 %
PCO2 BLDA: 47 MM HG (ref 35–45)
PCO2 BLDA: 47 MM HG (ref 35–45)
PCO2 BLDA: 49 MM HG (ref 35–45)
PCO2 BLDA: 50 MM HG (ref 35–45)
PH BLDA: 7.34 [PH] (ref 7.35–7.45)
PH BLDA: 7.35 [PH] (ref 7.35–7.45)
PH BLDA: 7.37 [PH] (ref 7.35–7.45)
PH BLDA: 7.41 [PH] (ref 7.35–7.45)
PHOSPHATE SERPL-MCNC: 3.7 MG/DL (ref 2.5–4.5)
PLATELET # BLD AUTO: 277 10E3/UL (ref 150–450)
PO2 BLDA: 100 MM HG (ref 80–105)
PO2 BLDA: 106 MM HG (ref 80–105)
PO2 BLDA: 142 MM HG (ref 80–105)
PO2 BLDA: 169 MM HG (ref 80–105)
POTASSIUM BLD-SCNC: 3 MMOL/L (ref 3.5–5)
POTASSIUM BLD-SCNC: 3.1 MMOL/L (ref 3.5–5)
POTASSIUM BLD-SCNC: 3.2 MMOL/L (ref 3.5–5)
POTASSIUM BLD-SCNC: 3.3 MMOL/L (ref 3.5–5)
POTASSIUM BLD-SCNC: 3.8 MMOL/L (ref 3.4–5.3)
PROT SERPL-MCNC: 5.7 G/DL (ref 6.8–8.8)
RBC # BLD AUTO: 3.49 10E6/UL (ref 3.8–5.2)
SODIUM BLD-SCNC: 139 MMOL/L (ref 133–144)
SODIUM BLD-SCNC: 140 MMOL/L (ref 133–144)
SODIUM SERPL-SCNC: 138 MMOL/L (ref 133–144)
SPECIMEN EXPIRATION DATE: NORMAL
WBC # BLD AUTO: 17 10E3/UL (ref 4–11)

## 2022-11-30 PROCEDURE — 99221 1ST HOSP IP/OBS SF/LOW 40: CPT | Mod: FS | Performed by: ANESTHESIOLOGY

## 2022-11-30 PROCEDURE — C1713 ANCHOR/SCREW BN/BN,TIS/BN: HCPCS | Performed by: ORTHOPAEDIC SURGERY

## 2022-11-30 PROCEDURE — 0SB20ZZ EXCISION OF LUMBAR VERTEBRAL DISC, OPEN APPROACH: ICD-10-PCS | Performed by: ORTHOPAEDIC SURGERY

## 2022-11-30 PROCEDURE — 83605 ASSAY OF LACTIC ACID: CPT

## 2022-11-30 PROCEDURE — 0SG10AJ FUSION OF 2 OR MORE LUMBAR VERTEBRAL JOINTS WITH INTERBODY FUSION DEVICE, POSTERIOR APPROACH, ANTERIOR COLUMN, OPEN APPROACH: ICD-10-PCS | Performed by: ORTHOPAEDIC SURGERY

## 2022-11-30 PROCEDURE — 258N000003 HC RX IP 258 OP 636: Performed by: ORTHOPAEDIC SURGERY

## 2022-11-30 PROCEDURE — 22216 INCIS ADDL SPINE SEGMENT: CPT | Mod: 62 | Performed by: NEUROLOGICAL SURGERY

## 2022-11-30 PROCEDURE — 0QS10ZZ REPOSITION SACRUM, OPEN APPROACH: ICD-10-PCS | Performed by: ORTHOPAEDIC SURGERY

## 2022-11-30 PROCEDURE — 999N000157 HC STATISTIC RCP TIME EA 10 MIN

## 2022-11-30 PROCEDURE — 85014 HEMATOCRIT: CPT | Performed by: NURSE PRACTITIONER

## 2022-11-30 PROCEDURE — 250N000011 HC RX IP 250 OP 636: Performed by: NURSE PRACTITIONER

## 2022-11-30 PROCEDURE — 0RG7071 FUSION OF 2 TO 7 THORACIC VERTEBRAL JOINTS WITH AUTOLOGOUS TISSUE SUBSTITUTE, POSTERIOR APPROACH, POSTERIOR COLUMN, OPEN APPROACH: ICD-10-PCS | Performed by: ORTHOPAEDIC SURGERY

## 2022-11-30 PROCEDURE — 250N000013 HC RX MED GY IP 250 OP 250 PS 637

## 2022-11-30 PROCEDURE — 01NR0ZZ RELEASE SACRAL NERVE, OPEN APPROACH: ICD-10-PCS | Performed by: ORTHOPAEDIC SURGERY

## 2022-11-30 PROCEDURE — 250N000011 HC RX IP 250 OP 636: Performed by: NURSE ANESTHETIST, CERTIFIED REGISTERED

## 2022-11-30 PROCEDURE — 22634 ARTHRD CMBN 1NTRSPC EA ADDL: CPT | Mod: 62 | Performed by: NEUROLOGICAL SURGERY

## 2022-11-30 PROCEDURE — 250N000025 HC SEVOFLURANE, PER MIN: Performed by: ORTHOPAEDIC SURGERY

## 2022-11-30 PROCEDURE — 250N000009 HC RX 250: Performed by: ANESTHESIOLOGY

## 2022-11-30 PROCEDURE — 999N000180 XR SURGERY CARM FLUORO LESS THAN 5 MIN: Mod: TC

## 2022-11-30 PROCEDURE — 22216 INCIS ADDL SPINE SEGMENT: CPT | Mod: 62 | Performed by: ORTHOPAEDIC SURGERY

## 2022-11-30 PROCEDURE — 999N000141 HC STATISTIC PRE-PROCEDURE NURSING ASSESSMENT: Performed by: ORTHOPAEDIC SURGERY

## 2022-11-30 PROCEDURE — 250N000011 HC RX IP 250 OP 636

## 2022-11-30 PROCEDURE — 0RGA071 FUSION OF THORACOLUMBAR VERTEBRAL JOINT WITH AUTOLOGOUS TISSUE SUBSTITUTE, POSTERIOR APPROACH, POSTERIOR COLUMN, OPEN APPROACH: ICD-10-PCS | Performed by: ORTHOPAEDIC SURGERY

## 2022-11-30 PROCEDURE — 250N000026 HC DESFLURANE, PER MIN: Performed by: ORTHOPAEDIC SURGERY

## 2022-11-30 PROCEDURE — 83735 ASSAY OF MAGNESIUM: CPT | Performed by: NURSE PRACTITIONER

## 2022-11-30 PROCEDURE — 84100 ASSAY OF PHOSPHORUS: CPT | Performed by: NURSE PRACTITIONER

## 2022-11-30 PROCEDURE — 4A11X4G MONITORING OF PERIPHERAL NERVOUS ELECTRICAL ACTIVITY, INTRAOPERATIVE, EXTERNAL APPROACH: ICD-10-PCS | Performed by: ORTHOPAEDIC SURGERY

## 2022-11-30 PROCEDURE — 258N000003 HC RX IP 258 OP 636: Performed by: ANESTHESIOLOGY

## 2022-11-30 PROCEDURE — 258N000003 HC RX IP 258 OP 636: Performed by: PHYSICIAN ASSISTANT

## 2022-11-30 PROCEDURE — 0SG3071 FUSION OF LUMBOSACRAL JOINT WITH AUTOLOGOUS TISSUE SUBSTITUTE, POSTERIOR APPROACH, POSTERIOR COLUMN, OPEN APPROACH: ICD-10-PCS | Performed by: ORTHOPAEDIC SURGERY

## 2022-11-30 PROCEDURE — 20930 SP BONE ALGRFT MORSEL ADD-ON: CPT | Mod: GC | Performed by: ORTHOPAEDIC SURGERY

## 2022-11-30 PROCEDURE — XNH7058 INSERTION OF INTERNAL FIXATION DEVICE WITH TULIP CONNECTOR INTO LEFT PELVIC BONE, OPEN APPROACH, NEW TECHNOLOGY GROUP 8: ICD-10-PCS | Performed by: ORTHOPAEDIC SURGERY

## 2022-11-30 PROCEDURE — 360N000086 HC SURGERY LEVEL 6 W/ FLUORO, PER MIN: Performed by: ORTHOPAEDIC SURGERY

## 2022-11-30 PROCEDURE — 22848 INSERT PELV FIXATION DEVICE: CPT | Mod: 62 | Performed by: ORTHOPAEDIC SURGERY

## 2022-11-30 PROCEDURE — 22843 INSERT SPINE FIXATION DEVICE: CPT | Mod: 62 | Performed by: NEUROLOGICAL SURGERY

## 2022-11-30 PROCEDURE — 250N000009 HC RX 250: Performed by: NURSE ANESTHETIST, CERTIFIED REGISTERED

## 2022-11-30 PROCEDURE — 22848 INSERT PELV FIXATION DEVICE: CPT | Mod: 62 | Performed by: NEUROLOGICAL SURGERY

## 2022-11-30 PROCEDURE — 22633 ARTHRD CMBN 1NTRSPC LUMBAR: CPT | Mod: 22 | Performed by: NEUROLOGICAL SURGERY

## 2022-11-30 PROCEDURE — 250N000011 HC RX IP 250 OP 636: Performed by: PHYSICIAN ASSISTANT

## 2022-11-30 PROCEDURE — 250N000009 HC RX 250: Performed by: PHYSICIAN ASSISTANT

## 2022-11-30 PROCEDURE — 84132 ASSAY OF SERUM POTASSIUM: CPT

## 2022-11-30 PROCEDURE — C1762 CONN TISS, HUMAN(INC FASCIA): HCPCS | Performed by: ORTHOPAEDIC SURGERY

## 2022-11-30 PROCEDURE — 0QS00ZZ REPOSITION LUMBAR VERTEBRA, OPEN APPROACH: ICD-10-PCS | Performed by: ORTHOPAEDIC SURGERY

## 2022-11-30 PROCEDURE — 84132 ASSAY OF SERUM POTASSIUM: CPT | Performed by: NURSE PRACTITIONER

## 2022-11-30 PROCEDURE — 36415 COLL VENOUS BLD VENIPUNCTURE: CPT | Performed by: PHYSICIAN ASSISTANT

## 2022-11-30 PROCEDURE — 0SB40ZZ EXCISION OF LUMBOSACRAL DISC, OPEN APPROACH: ICD-10-PCS | Performed by: ORTHOPAEDIC SURGERY

## 2022-11-30 PROCEDURE — 370N000017 HC ANESTHESIA TECHNICAL FEE, PER MIN: Performed by: ORTHOPAEDIC SURGERY

## 2022-11-30 PROCEDURE — 200N000002 HC R&B ICU UMMC

## 2022-11-30 PROCEDURE — 272N000001 HC OR GENERAL SUPPLY STERILE: Performed by: ORTHOPAEDIC SURGERY

## 2022-11-30 PROCEDURE — L8699 PROSTHETIC IMPLANT NOS: HCPCS | Performed by: ORTHOPAEDIC SURGERY

## 2022-11-30 PROCEDURE — 20936 SP BONE AGRFT LOCAL ADD-ON: CPT | Mod: GC | Performed by: ORTHOPAEDIC SURGERY

## 2022-11-30 PROCEDURE — L0472 TLSO RIGID FRAME HYPEREX PRE: HCPCS

## 2022-11-30 PROCEDURE — 250N000011 HC RX IP 250 OP 636: Performed by: ANESTHESIOLOGY

## 2022-11-30 PROCEDURE — 22614 ARTHRD PST TQ 1NTRSPC EA ADD: CPT | Mod: 62 | Performed by: NEUROLOGICAL SURGERY

## 2022-11-30 PROCEDURE — 3E0V0GB INTRODUCTION OF RECOMBINANT BONE MORPHOGENETIC PROTEIN INTO BONES, OPEN APPROACH: ICD-10-PCS | Performed by: ORTHOPAEDIC SURGERY

## 2022-11-30 PROCEDURE — 22214 INCIS 1 VERTEBRAL SEG LUMBAR: CPT | Mod: 62 | Performed by: NEUROLOGICAL SURGERY

## 2022-11-30 PROCEDURE — 258N000003 HC RX IP 258 OP 636: Performed by: NURSE PRACTITIONER

## 2022-11-30 PROCEDURE — 3E0U0GB INTRODUCTION OF RECOMBINANT BONE MORPHOGENETIC PROTEIN INTO JOINTS, OPEN APPROACH: ICD-10-PCS | Performed by: ORTHOPAEDIC SURGERY

## 2022-11-30 PROCEDURE — 250N000009 HC RX 250: Performed by: ORTHOPAEDIC SURGERY

## 2022-11-30 PROCEDURE — 5A09357 ASSISTANCE WITH RESPIRATORY VENTILATION, LESS THAN 24 CONSECUTIVE HOURS, CONTINUOUS POSITIVE AIRWAY PRESSURE: ICD-10-PCS

## 2022-11-30 PROCEDURE — 272N000004 HC RX 272: Performed by: ORTHOPAEDIC SURGERY

## 2022-11-30 PROCEDURE — 250N000011 HC RX IP 250 OP 636: Performed by: ORTHOPAEDIC SURGERY

## 2022-11-30 PROCEDURE — 0SG30AJ FUSION OF LUMBOSACRAL JOINT WITH INTERBODY FUSION DEVICE, POSTERIOR APPROACH, ANTERIOR COLUMN, OPEN APPROACH: ICD-10-PCS | Performed by: ORTHOPAEDIC SURGERY

## 2022-11-30 PROCEDURE — 258N000003 HC RX IP 258 OP 636: Performed by: NURSE ANESTHETIST, CERTIFIED REGISTERED

## 2022-11-30 PROCEDURE — 999N000015 HC STATISTIC ARTERIAL MONITORING DAILY

## 2022-11-30 PROCEDURE — 22853 INSJ BIOMECHANICAL DEVICE: CPT | Mod: 62 | Performed by: ORTHOPAEDIC SURGERY

## 2022-11-30 PROCEDURE — 86850 RBC ANTIBODY SCREEN: CPT | Performed by: PHYSICIAN ASSISTANT

## 2022-11-30 PROCEDURE — 00NY0ZZ RELEASE LUMBAR SPINAL CORD, OPEN APPROACH: ICD-10-PCS | Performed by: ORTHOPAEDIC SURGERY

## 2022-11-30 PROCEDURE — 22634 ARTHRD CMBN 1NTRSPC EA ADDL: CPT | Mod: 62 | Performed by: ORTHOPAEDIC SURGERY

## 2022-11-30 PROCEDURE — 22843 INSERT SPINE FIXATION DEVICE: CPT | Mod: 62 | Performed by: ORTHOPAEDIC SURGERY

## 2022-11-30 PROCEDURE — 22214 INCIS 1 VERTEBRAL SEG LUMBAR: CPT | Mod: 22 | Performed by: ORTHOPAEDIC SURGERY

## 2022-11-30 PROCEDURE — 22853 INSJ BIOMECHANICAL DEVICE: CPT | Mod: 62 | Performed by: NEUROLOGICAL SURGERY

## 2022-11-30 PROCEDURE — 710N000010 HC RECOVERY PHASE 1, LEVEL 2, PER MIN: Performed by: ORTHOPAEDIC SURGERY

## 2022-11-30 PROCEDURE — 01NB0ZZ RELEASE LUMBAR NERVE, OPEN APPROACH: ICD-10-PCS | Performed by: ORTHOPAEDIC SURGERY

## 2022-11-30 PROCEDURE — P9045 ALBUMIN (HUMAN), 5%, 250 ML: HCPCS | Performed by: NURSE ANESTHETIST, CERTIFIED REGISTERED

## 2022-11-30 PROCEDURE — 22633 ARTHRD CMBN 1NTRSPC LUMBAR: CPT | Mod: 22 | Performed by: ORTHOPAEDIC SURGERY

## 2022-11-30 PROCEDURE — 250N000013 HC RX MED GY IP 250 OP 250 PS 637: Performed by: NURSE ANESTHETIST, CERTIFIED REGISTERED

## 2022-11-30 PROCEDURE — XNH6058 INSERTION OF INTERNAL FIXATION DEVICE WITH TULIP CONNECTOR INTO RIGHT PELVIC BONE, OPEN APPROACH, NEW TECHNOLOGY GROUP 8: ICD-10-PCS | Performed by: ORTHOPAEDIC SURGERY

## 2022-11-30 PROCEDURE — 22614 ARTHRD PST TQ 1NTRSPC EA ADD: CPT | Mod: 62 | Performed by: ORTHOPAEDIC SURGERY

## 2022-11-30 PROCEDURE — 8E09XBZ COMPUTER ASSISTED PROCEDURE OF HEAD AND NECK REGION: ICD-10-PCS | Performed by: ORTHOPAEDIC SURGERY

## 2022-11-30 PROCEDURE — 0SG1071 FUSION OF 2 OR MORE LUMBAR VERTEBRAL JOINTS WITH AUTOLOGOUS TISSUE SUBSTITUTE, POSTERIOR APPROACH, POSTERIOR COLUMN, OPEN APPROACH: ICD-10-PCS | Performed by: ORTHOPAEDIC SURGERY

## 2022-11-30 PROCEDURE — 82330 ASSAY OF CALCIUM: CPT | Performed by: NURSE PRACTITIONER

## 2022-11-30 PROCEDURE — 61783 SCAN PROC SPINAL: CPT | Mod: GC | Performed by: ORTHOPAEDIC SURGERY

## 2022-11-30 PROCEDURE — 272N000002 HC OR SUPPLY OTHER OPNP: Performed by: ORTHOPAEDIC SURGERY

## 2022-11-30 DEVICE — IMP SCR MEDT 5.5/6.0MM SOLERA 7.5X50MM MA 55840007550: Type: IMPLANTABLE DEVICE | Site: SPINE LUMBAR | Status: FUNCTIONAL

## 2022-11-30 DEVICE — IMP SCR MEDT 5.5/6.0MM SOLERA 6.5X40MM MA 55840006540: Type: IMPLANTABLE DEVICE | Site: SPINE LUMBAR | Status: FUNCTIONAL

## 2022-11-30 DEVICE — 10.5 MM X 90 MM IFUSE BEDROCK GRANITE IMPLANT
Type: IMPLANTABLE DEVICE | Site: SPINE LUMBAR | Status: FUNCTIONAL
Brand: IFUSE GRANITE IMPLANT SYSTEM

## 2022-11-30 DEVICE — GRAFT BN CANC 30CC CRSH 1-10MM 800104: Type: IMPLANTABLE DEVICE | Site: SPINE LUMBAR | Status: FUNCTIONAL

## 2022-11-30 DEVICE — IMP SCR MEDT 5.5/6.0MM SOLERA 7.5X40MM MA 55840007540: Type: IMPLANTABLE DEVICE | Site: SPINE LUMBAR | Status: FUNCTIONAL

## 2022-11-30 DEVICE — SCREW 55720006550 5.5/6.0 DRMAS 6.5X50
Type: IMPLANTABLE DEVICE | Site: SPINE LUMBAR | Status: FUNCTIONAL
Brand: CD HORIZON® SOLERA® SPINAL SYSTEM

## 2022-11-30 DEVICE — IMP ROD MEDT SOLERA LINED 5.5X500MM CHR 1555200500: Type: IMPLANTABLE DEVICE | Site: SPINE LUMBAR | Status: FUNCTIONAL

## 2022-11-30 DEVICE — SPACER 5011122 CONTROL PTC 12 DEG 11X22
Type: IMPLANTABLE DEVICE | Site: SPINE LUMBAR | Status: FUNCTIONAL
Brand: CAPSTONE CONTROL PTC SPINAL SYSTEM

## 2022-11-30 DEVICE — IMP SCR MEDT 5.5/6.0MM SOLERA 5.5X50MM MA 55840005550: Type: IMPLANTABLE DEVICE | Site: SPINE LUMBAR | Status: FUNCTIONAL

## 2022-11-30 DEVICE — IMP SCR MEDT 5.5/6.0MM SOLERA 6.5X50MM MA 55840006550: Type: IMPLANTABLE DEVICE | Site: SPINE LUMBAR | Status: FUNCTIONAL

## 2022-11-30 DEVICE — IMP SCR MEDT 5.5/6.0MM SOLERA 5.5X45MM MA 55840005545: Type: IMPLANTABLE DEVICE | Site: SPINE LUMBAR | Status: FUNCTIONAL

## 2022-11-30 DEVICE — GRAFT BONE INFUSE BMP LG 7510600: Type: IMPLANTABLE DEVICE | Site: SPINE LUMBAR | Status: FUNCTIONAL

## 2022-11-30 DEVICE — IMP SCR MEDT 5.5/6.0MM SOLERA 5.0X40MM MA 55840005040: Type: IMPLANTABLE DEVICE | Site: SPINE LUMBAR | Status: FUNCTIONAL

## 2022-11-30 DEVICE — GRAFT BONE MAGNIFUSE 1CMX20CM 7509212: Type: IMPLANTABLE DEVICE | Site: SPINE LUMBAR | Status: FUNCTIONAL

## 2022-11-30 RX ORDER — ACETAMINOPHEN 325 MG/1
975 TABLET ORAL EVERY 8 HOURS
Status: COMPLETED | OUTPATIENT
Start: 2022-11-30 | End: 2022-12-03

## 2022-11-30 RX ORDER — HYDROMORPHONE HCL IN WATER/PF 6 MG/30 ML
0.4 PATIENT CONTROLLED ANALGESIA SYRINGE INTRAVENOUS
Status: DISCONTINUED | OUTPATIENT
Start: 2022-11-30 | End: 2022-11-30

## 2022-11-30 RX ORDER — ONDANSETRON 2 MG/ML
INJECTION INTRAMUSCULAR; INTRAVENOUS PRN
Status: DISCONTINUED | OUTPATIENT
Start: 2022-11-30 | End: 2022-11-30

## 2022-11-30 RX ORDER — ONDANSETRON 4 MG/1
4 TABLET, ORALLY DISINTEGRATING ORAL EVERY 6 HOURS PRN
Status: DISCONTINUED | OUTPATIENT
Start: 2022-11-30 | End: 2022-12-06 | Stop reason: HOSPADM

## 2022-11-30 RX ORDER — LABETALOL HYDROCHLORIDE 5 MG/ML
10 INJECTION, SOLUTION INTRAVENOUS
Status: DISCONTINUED | OUTPATIENT
Start: 2022-11-30 | End: 2022-11-30 | Stop reason: HOSPADM

## 2022-11-30 RX ORDER — METHOTREXATE 2.5 MG/1
25 TABLET ORAL WEEKLY
Status: DISCONTINUED | OUTPATIENT
Start: 2022-12-05 | End: 2022-12-02

## 2022-11-30 RX ORDER — NALOXONE HYDROCHLORIDE 0.4 MG/ML
0.4 INJECTION, SOLUTION INTRAMUSCULAR; INTRAVENOUS; SUBCUTANEOUS
Status: DISCONTINUED | OUTPATIENT
Start: 2022-11-30 | End: 2022-12-06 | Stop reason: HOSPADM

## 2022-11-30 RX ORDER — FENTANYL CITRATE 50 UG/ML
25-50 INJECTION, SOLUTION INTRAMUSCULAR; INTRAVENOUS
Status: DISCONTINUED | OUTPATIENT
Start: 2022-11-30 | End: 2022-11-30

## 2022-11-30 RX ORDER — LIDOCAINE HYDROCHLORIDE 20 MG/ML
INJECTION, SOLUTION INFILTRATION; PERINEURAL PRN
Status: DISCONTINUED | OUTPATIENT
Start: 2022-11-30 | End: 2022-11-30

## 2022-11-30 RX ORDER — ONDANSETRON 4 MG/1
4 TABLET, ORALLY DISINTEGRATING ORAL EVERY 6 HOURS PRN
Status: DISCONTINUED | OUTPATIENT
Start: 2022-11-30 | End: 2022-11-30

## 2022-11-30 RX ORDER — HYDROXYCHLOROQUINE SULFATE 200 MG/1
200 TABLET, FILM COATED ORAL 2 TIMES DAILY
Status: DISCONTINUED | OUTPATIENT
Start: 2022-11-30 | End: 2022-12-06 | Stop reason: HOSPADM

## 2022-11-30 RX ORDER — MAGNESIUM SULFATE HEPTAHYDRATE 40 MG/ML
2 INJECTION, SOLUTION INTRAVENOUS ONCE
Status: COMPLETED | OUTPATIENT
Start: 2022-11-30 | End: 2022-11-30

## 2022-11-30 RX ORDER — LIDOCAINE 40 MG/G
CREAM TOPICAL
Status: DISCONTINUED | OUTPATIENT
Start: 2022-11-30 | End: 2022-12-06 | Stop reason: HOSPADM

## 2022-11-30 RX ORDER — IPRATROPIUM BROMIDE AND ALBUTEROL SULFATE 2.5; .5 MG/3ML; MG/3ML
3 SOLUTION RESPIRATORY (INHALATION) ONCE
Status: COMPLETED | OUTPATIENT
Start: 2022-11-30 | End: 2022-11-30

## 2022-11-30 RX ORDER — SODIUM CHLORIDE, SODIUM LACTATE, POTASSIUM CHLORIDE, CALCIUM CHLORIDE 600; 310; 30; 20 MG/100ML; MG/100ML; MG/100ML; MG/100ML
INJECTION, SOLUTION INTRAVENOUS CONTINUOUS
Status: DISCONTINUED | OUTPATIENT
Start: 2022-11-30 | End: 2022-11-30 | Stop reason: HOSPADM

## 2022-11-30 RX ORDER — CYCLOBENZAPRINE HCL 10 MG
10 TABLET ORAL AT BEDTIME
Status: DISCONTINUED | OUTPATIENT
Start: 2022-11-30 | End: 2022-12-06 | Stop reason: HOSPADM

## 2022-11-30 RX ORDER — OXYCODONE HYDROCHLORIDE 10 MG/1
10 TABLET ORAL EVERY 4 HOURS PRN
Status: DISCONTINUED | OUTPATIENT
Start: 2022-11-30 | End: 2022-12-02

## 2022-11-30 RX ORDER — CARBOXYMETHYLCELLULOSE SODIUM 5 MG/ML
1 SOLUTION/ DROPS OPHTHALMIC 3 TIMES DAILY PRN
Status: DISCONTINUED | OUTPATIENT
Start: 2022-11-30 | End: 2022-12-06 | Stop reason: HOSPADM

## 2022-11-30 RX ORDER — PHENYLEPHRINE HCL IN 0.9% NACL 50MG/250ML
.1-6 PLASTIC BAG, INJECTION (ML) INTRAVENOUS CONTINUOUS
Status: DISCONTINUED | OUTPATIENT
Start: 2022-11-30 | End: 2022-11-30 | Stop reason: HOSPADM

## 2022-11-30 RX ORDER — SODIUM CHLORIDE, SODIUM LACTATE, POTASSIUM CHLORIDE, CALCIUM CHLORIDE 600; 310; 30; 20 MG/100ML; MG/100ML; MG/100ML; MG/100ML
INJECTION, SOLUTION INTRAVENOUS CONTINUOUS PRN
Status: DISCONTINUED | OUTPATIENT
Start: 2022-11-30 | End: 2022-11-30

## 2022-11-30 RX ORDER — ALBUTEROL SULFATE 0.83 MG/ML
SOLUTION RESPIRATORY (INHALATION) PRN
Status: DISCONTINUED | OUTPATIENT
Start: 2022-11-30 | End: 2022-11-30

## 2022-11-30 RX ORDER — AMOXICILLIN 250 MG
1 CAPSULE ORAL 2 TIMES DAILY
Status: DISCONTINUED | OUTPATIENT
Start: 2022-11-30 | End: 2022-11-30

## 2022-11-30 RX ORDER — NALOXONE HYDROCHLORIDE 0.4 MG/ML
0.2 INJECTION, SOLUTION INTRAMUSCULAR; INTRAVENOUS; SUBCUTANEOUS
Status: DISCONTINUED | OUTPATIENT
Start: 2022-11-30 | End: 2022-12-06 | Stop reason: HOSPADM

## 2022-11-30 RX ORDER — SODIUM CHLORIDE 9 MG/ML
INJECTION, SOLUTION INTRAVENOUS CONTINUOUS
Status: DISCONTINUED | OUTPATIENT
Start: 2022-11-30 | End: 2022-11-30

## 2022-11-30 RX ORDER — DULOXETIN HYDROCHLORIDE 60 MG/1
120 CAPSULE, DELAYED RELEASE ORAL AT BEDTIME
Status: DISCONTINUED | OUTPATIENT
Start: 2022-11-30 | End: 2022-12-06 | Stop reason: HOSPADM

## 2022-11-30 RX ORDER — FOLIC ACID 1 MG/1
1 TABLET ORAL AT BEDTIME
Status: DISCONTINUED | OUTPATIENT
Start: 2022-11-30 | End: 2022-12-06 | Stop reason: HOSPADM

## 2022-11-30 RX ORDER — FENTANYL CITRATE 50 UG/ML
25 INJECTION, SOLUTION INTRAMUSCULAR; INTRAVENOUS EVERY 5 MIN PRN
Status: DISCONTINUED | OUTPATIENT
Start: 2022-11-30 | End: 2022-11-30 | Stop reason: HOSPADM

## 2022-11-30 RX ORDER — EPHEDRINE SULFATE 50 MG/ML
INJECTION, SOLUTION INTRAMUSCULAR; INTRAVENOUS; SUBCUTANEOUS PRN
Status: DISCONTINUED | OUTPATIENT
Start: 2022-11-30 | End: 2022-11-30

## 2022-11-30 RX ORDER — HYDROMORPHONE HYDROCHLORIDE 1 MG/ML
.3-.5 INJECTION, SOLUTION INTRAMUSCULAR; INTRAVENOUS; SUBCUTANEOUS
Status: DISCONTINUED | OUTPATIENT
Start: 2022-11-30 | End: 2022-12-02

## 2022-11-30 RX ORDER — ALBUTEROL SULFATE 90 UG/1
AEROSOL, METERED RESPIRATORY (INHALATION) PRN
Status: DISCONTINUED | OUTPATIENT
Start: 2022-11-30 | End: 2022-11-30

## 2022-11-30 RX ORDER — LIDOCAINE 40 MG/G
CREAM TOPICAL
Status: DISCONTINUED | OUTPATIENT
Start: 2022-11-30 | End: 2022-11-30 | Stop reason: HOSPADM

## 2022-11-30 RX ORDER — ACETAMINOPHEN 325 MG/1
975 TABLET ORAL EVERY 6 HOURS PRN
Status: DISCONTINUED | OUTPATIENT
Start: 2022-11-30 | End: 2022-11-30

## 2022-11-30 RX ORDER — FAMOTIDINE 20 MG/1
20 TABLET, FILM COATED ORAL 2 TIMES DAILY
Status: DISCONTINUED | OUTPATIENT
Start: 2022-11-30 | End: 2022-12-01

## 2022-11-30 RX ORDER — ALBUTEROL SULFATE 0.83 MG/ML
2.5 SOLUTION RESPIRATORY (INHALATION) EVERY 6 HOURS PRN
Status: DISCONTINUED | OUTPATIENT
Start: 2022-11-30 | End: 2022-12-06 | Stop reason: HOSPADM

## 2022-11-30 RX ORDER — FENTANYL CITRATE 50 UG/ML
50 INJECTION, SOLUTION INTRAMUSCULAR; INTRAVENOUS EVERY 5 MIN PRN
Status: DISCONTINUED | OUTPATIENT
Start: 2022-11-30 | End: 2022-11-30 | Stop reason: HOSPADM

## 2022-11-30 RX ORDER — ONDANSETRON 4 MG/1
4 TABLET, ORALLY DISINTEGRATING ORAL EVERY 30 MIN PRN
Status: DISCONTINUED | OUTPATIENT
Start: 2022-11-30 | End: 2022-11-30 | Stop reason: HOSPADM

## 2022-11-30 RX ORDER — LIDOCAINE HYDROCHLORIDE ANHYDROUS AND DEXTROSE MONOHYDRATE .8; 5 G/100ML; G/100ML
1 INJECTION, SOLUTION INTRAVENOUS CONTINUOUS
Status: DISCONTINUED | OUTPATIENT
Start: 2022-11-30 | End: 2022-11-30 | Stop reason: HOSPADM

## 2022-11-30 RX ORDER — HYDROXYZINE HYDROCHLORIDE 25 MG/1
25 TABLET, FILM COATED ORAL EVERY 6 HOURS PRN
Status: DISCONTINUED | OUTPATIENT
Start: 2022-11-30 | End: 2022-12-06 | Stop reason: HOSPADM

## 2022-11-30 RX ORDER — MULTIVIT WITH MINERALS/LUTEIN
1000 TABLET ORAL DAILY
Status: DISCONTINUED | OUTPATIENT
Start: 2022-12-01 | End: 2022-12-06 | Stop reason: HOSPADM

## 2022-11-30 RX ORDER — ESMOLOL HYDROCHLORIDE 10 MG/ML
INJECTION INTRAVENOUS PRN
Status: DISCONTINUED | OUTPATIENT
Start: 2022-11-30 | End: 2022-11-30

## 2022-11-30 RX ORDER — PROCHLORPERAZINE 25 MG
25 SUPPOSITORY, RECTAL RECTAL EVERY 12 HOURS PRN
Status: DISCONTINUED | OUTPATIENT
Start: 2022-11-30 | End: 2022-12-06 | Stop reason: HOSPADM

## 2022-11-30 RX ORDER — KETAMINE HYDROCHLORIDE 10 MG/ML
INJECTION INTRAMUSCULAR; INTRAVENOUS PRN
Status: DISCONTINUED | OUTPATIENT
Start: 2022-11-30 | End: 2022-11-30

## 2022-11-30 RX ORDER — PROCHLORPERAZINE MALEATE 5 MG
10 TABLET ORAL EVERY 6 HOURS PRN
Status: DISCONTINUED | OUTPATIENT
Start: 2022-11-30 | End: 2022-11-30

## 2022-11-30 RX ORDER — ONDANSETRON 2 MG/ML
4 INJECTION INTRAMUSCULAR; INTRAVENOUS EVERY 6 HOURS PRN
Status: DISCONTINUED | OUTPATIENT
Start: 2022-11-30 | End: 2022-12-06 | Stop reason: HOSPADM

## 2022-11-30 RX ORDER — DEXAMETHASONE SODIUM PHOSPHATE 4 MG/ML
INJECTION, SOLUTION INTRA-ARTICULAR; INTRALESIONAL; INTRAMUSCULAR; INTRAVENOUS; SOFT TISSUE PRN
Status: DISCONTINUED | OUTPATIENT
Start: 2022-11-30 | End: 2022-11-30

## 2022-11-30 RX ORDER — DEXTROSE MONOHYDRATE 25 G/50ML
25-50 INJECTION, SOLUTION INTRAVENOUS
Status: DISCONTINUED | OUTPATIENT
Start: 2022-11-30 | End: 2022-12-02

## 2022-11-30 RX ORDER — PROPOFOL 10 MG/ML
INJECTION, EMULSION INTRAVENOUS CONTINUOUS PRN
Status: DISCONTINUED | OUTPATIENT
Start: 2022-11-30 | End: 2022-11-30

## 2022-11-30 RX ORDER — BUSPIRONE HYDROCHLORIDE 10 MG/1
30 TABLET ORAL 2 TIMES DAILY
Status: DISCONTINUED | OUTPATIENT
Start: 2022-11-30 | End: 2022-12-06 | Stop reason: HOSPADM

## 2022-11-30 RX ORDER — ARIPIPRAZOLE 5 MG/1
5 TABLET ORAL DAILY
Status: DISCONTINUED | OUTPATIENT
Start: 2022-12-01 | End: 2022-12-06 | Stop reason: HOSPADM

## 2022-11-30 RX ORDER — HYDROMORPHONE HYDROCHLORIDE 1 MG/ML
0.4 INJECTION, SOLUTION INTRAMUSCULAR; INTRAVENOUS; SUBCUTANEOUS EVERY 5 MIN PRN
Status: DISCONTINUED | OUTPATIENT
Start: 2022-11-30 | End: 2022-11-30 | Stop reason: HOSPADM

## 2022-11-30 RX ORDER — ACETAMINOPHEN 325 MG/1
650 TABLET ORAL EVERY 4 HOURS PRN
Status: DISCONTINUED | OUTPATIENT
Start: 2022-12-03 | End: 2022-12-06 | Stop reason: HOSPADM

## 2022-11-30 RX ORDER — CEFAZOLIN SODIUM 1 G/3ML
INJECTION, POWDER, FOR SOLUTION INTRAMUSCULAR; INTRAVENOUS PRN
Status: DISCONTINUED | OUTPATIENT
Start: 2022-11-30 | End: 2022-11-30

## 2022-11-30 RX ORDER — CEFAZOLIN SODIUM/WATER 2 G/20 ML
2 SYRINGE (ML) INTRAVENOUS SEE ADMIN INSTRUCTIONS
Status: DISCONTINUED | OUTPATIENT
Start: 2022-11-30 | End: 2022-11-30 | Stop reason: HOSPADM

## 2022-11-30 RX ORDER — OXYCODONE HYDROCHLORIDE 5 MG/1
5 TABLET ORAL EVERY 4 HOURS PRN
Status: DISCONTINUED | OUTPATIENT
Start: 2022-11-30 | End: 2022-12-02

## 2022-11-30 RX ORDER — DIPHENHYDRAMINE HCL 25 MG
25 CAPSULE ORAL EVERY 6 HOURS PRN
Status: DISCONTINUED | OUTPATIENT
Start: 2022-11-30 | End: 2022-11-30 | Stop reason: HOSPADM

## 2022-11-30 RX ORDER — MONTELUKAST SODIUM 10 MG/1
10 TABLET ORAL DAILY
Status: DISCONTINUED | OUTPATIENT
Start: 2022-12-01 | End: 2022-12-06 | Stop reason: HOSPADM

## 2022-11-30 RX ORDER — CEFAZOLIN SODIUM/WATER 2 G/20 ML
2 SYRINGE (ML) INTRAVENOUS
Status: COMPLETED | OUTPATIENT
Start: 2022-11-30 | End: 2022-11-30

## 2022-11-30 RX ORDER — MULTIVIT,TX WITH IRON,MINERALS
250 TABLET, EXTENDED RELEASE ORAL DAILY
Status: DISCONTINUED | OUTPATIENT
Start: 2022-12-01 | End: 2022-12-06 | Stop reason: HOSPADM

## 2022-11-30 RX ORDER — CALCIUM CARBONATE 500(1250)
500 TABLET ORAL DAILY
Status: DISCONTINUED | OUTPATIENT
Start: 2022-12-01 | End: 2022-12-06 | Stop reason: HOSPADM

## 2022-11-30 RX ORDER — DIPHENHYDRAMINE HYDROCHLORIDE 50 MG/ML
25 INJECTION INTRAMUSCULAR; INTRAVENOUS EVERY 6 HOURS PRN
Status: DISCONTINUED | OUTPATIENT
Start: 2022-11-30 | End: 2022-11-30 | Stop reason: HOSPADM

## 2022-11-30 RX ORDER — ROPINIROLE 0.25 MG/1
0.5 TABLET, FILM COATED ORAL AT BEDTIME
Status: DISCONTINUED | OUTPATIENT
Start: 2022-11-30 | End: 2022-12-06 | Stop reason: HOSPADM

## 2022-11-30 RX ORDER — LISINOPRIL AND HYDROCHLOROTHIAZIDE 20; 25 MG/1; MG/1
1 TABLET ORAL DAILY
Status: DISCONTINUED | OUTPATIENT
Start: 2022-12-01 | End: 2022-12-06 | Stop reason: HOSPADM

## 2022-11-30 RX ORDER — POLYETHYLENE GLYCOL 3350 17 G/17G
17 POWDER, FOR SOLUTION ORAL DAILY
Status: DISCONTINUED | OUTPATIENT
Start: 2022-12-01 | End: 2022-12-06 | Stop reason: HOSPADM

## 2022-11-30 RX ORDER — HYDROMORPHONE HYDROCHLORIDE 1 MG/ML
INJECTION, SOLUTION INTRAMUSCULAR; INTRAVENOUS; SUBCUTANEOUS
Status: COMPLETED
Start: 2022-11-30 | End: 2022-12-01

## 2022-11-30 RX ORDER — BISACODYL 10 MG
10 SUPPOSITORY, RECTAL RECTAL DAILY PRN
Status: DISCONTINUED | OUTPATIENT
Start: 2022-11-30 | End: 2022-11-30

## 2022-11-30 RX ORDER — ALBUTEROL SULFATE 90 UG/1
2 AEROSOL, METERED RESPIRATORY (INHALATION) EVERY 6 HOURS PRN
Status: DISCONTINUED | OUTPATIENT
Start: 2022-11-30 | End: 2022-12-06 | Stop reason: HOSPADM

## 2022-11-30 RX ORDER — HYDROMORPHONE HYDROCHLORIDE 1 MG/ML
0.2 INJECTION, SOLUTION INTRAMUSCULAR; INTRAVENOUS; SUBCUTANEOUS EVERY 5 MIN PRN
Status: DISCONTINUED | OUTPATIENT
Start: 2022-11-30 | End: 2022-11-30 | Stop reason: HOSPADM

## 2022-11-30 RX ORDER — FENTANYL CITRATE 50 UG/ML
INJECTION, SOLUTION INTRAMUSCULAR; INTRAVENOUS PRN
Status: DISCONTINUED | OUTPATIENT
Start: 2022-11-30 | End: 2022-11-30

## 2022-11-30 RX ORDER — SODIUM CHLORIDE, SODIUM LACTATE, POTASSIUM CHLORIDE, CALCIUM CHLORIDE 600; 310; 30; 20 MG/100ML; MG/100ML; MG/100ML; MG/100ML
INJECTION, SOLUTION INTRAVENOUS CONTINUOUS
Status: DISCONTINUED | OUTPATIENT
Start: 2022-11-30 | End: 2022-11-30

## 2022-11-30 RX ORDER — CEFAZOLIN SODIUM 2 G/100ML
2 INJECTION, SOLUTION INTRAVENOUS EVERY 8 HOURS
Status: COMPLETED | OUTPATIENT
Start: 2022-11-30 | End: 2022-12-01

## 2022-11-30 RX ORDER — PROPOFOL 10 MG/ML
INJECTION, EMULSION INTRAVENOUS PRN
Status: DISCONTINUED | OUTPATIENT
Start: 2022-11-30 | End: 2022-11-30

## 2022-11-30 RX ORDER — CALCIUM GLUCONATE 20 MG/ML
1 INJECTION, SOLUTION INTRAVENOUS ONCE
Status: COMPLETED | OUTPATIENT
Start: 2022-11-30 | End: 2022-11-30

## 2022-11-30 RX ORDER — ONDANSETRON 2 MG/ML
4 INJECTION INTRAMUSCULAR; INTRAVENOUS EVERY 30 MIN PRN
Status: DISCONTINUED | OUTPATIENT
Start: 2022-11-30 | End: 2022-11-30 | Stop reason: HOSPADM

## 2022-11-30 RX ORDER — PROCHLORPERAZINE MALEATE 10 MG
10 TABLET ORAL EVERY 6 HOURS PRN
Status: DISCONTINUED | OUTPATIENT
Start: 2022-11-30 | End: 2022-12-06 | Stop reason: HOSPADM

## 2022-11-30 RX ORDER — ACETAMINOPHEN 325 MG/10.15ML
975 LIQUID ORAL EVERY 6 HOURS PRN
Status: DISCONTINUED | OUTPATIENT
Start: 2022-11-30 | End: 2022-11-30

## 2022-11-30 RX ORDER — AMOXICILLIN 250 MG
2 CAPSULE ORAL 2 TIMES DAILY
Status: DISCONTINUED | OUTPATIENT
Start: 2022-11-30 | End: 2022-12-06 | Stop reason: HOSPADM

## 2022-11-30 RX ORDER — ONDANSETRON 2 MG/ML
4 INJECTION INTRAMUSCULAR; INTRAVENOUS EVERY 6 HOURS PRN
Status: DISCONTINUED | OUTPATIENT
Start: 2022-11-30 | End: 2022-11-30

## 2022-11-30 RX ORDER — DEXAMETHASONE SODIUM PHOSPHATE 4 MG/ML
4 INJECTION, SOLUTION INTRA-ARTICULAR; INTRALESIONAL; INTRAMUSCULAR; INTRAVENOUS; SOFT TISSUE
Status: DISCONTINUED | OUTPATIENT
Start: 2022-11-30 | End: 2022-11-30 | Stop reason: HOSPADM

## 2022-11-30 RX ORDER — NICOTINE POLACRILEX 4 MG
15-30 LOZENGE BUCCAL
Status: DISCONTINUED | OUTPATIENT
Start: 2022-11-30 | End: 2022-12-02

## 2022-11-30 RX ORDER — OXYCODONE HYDROCHLORIDE 5 MG/1
5-10 TABLET ORAL EVERY 4 HOURS PRN
Status: DISCONTINUED | OUTPATIENT
Start: 2022-11-30 | End: 2022-11-30

## 2022-11-30 RX ORDER — SODIUM CHLORIDE 9 MG/ML
INJECTION, SOLUTION INTRAVENOUS CONTINUOUS PRN
Status: DISCONTINUED | OUTPATIENT
Start: 2022-11-30 | End: 2022-11-30

## 2022-11-30 RX ORDER — AMOXICILLIN 250 MG
1 CAPSULE ORAL 2 TIMES DAILY
Status: DISCONTINUED | OUTPATIENT
Start: 2022-11-30 | End: 2022-12-06 | Stop reason: HOSPADM

## 2022-11-30 RX ORDER — SODIUM CHLORIDE, SODIUM LACTATE, POTASSIUM CHLORIDE, CALCIUM CHLORIDE 600; 310; 30; 20 MG/100ML; MG/100ML; MG/100ML; MG/100ML
INJECTION, SOLUTION INTRAVENOUS CONTINUOUS
Status: DISCONTINUED | OUTPATIENT
Start: 2022-11-30 | End: 2022-12-01

## 2022-11-30 RX ORDER — BISACODYL 10 MG
10 SUPPOSITORY, RECTAL RECTAL DAILY PRN
Status: DISCONTINUED | OUTPATIENT
Start: 2022-11-30 | End: 2022-12-06 | Stop reason: HOSPADM

## 2022-11-30 RX ORDER — VANCOMYCIN HYDROCHLORIDE 1 G/20ML
INJECTION, POWDER, LYOPHILIZED, FOR SOLUTION INTRAVENOUS PRN
Status: DISCONTINUED | OUTPATIENT
Start: 2022-11-30 | End: 2022-11-30 | Stop reason: HOSPADM

## 2022-11-30 RX ORDER — POLYETHYLENE GLYCOL 3350 17 G/17G
17 POWDER, FOR SOLUTION ORAL DAILY
Status: DISCONTINUED | OUTPATIENT
Start: 2022-11-30 | End: 2022-11-30

## 2022-11-30 RX ORDER — METHOCARBAMOL 500 MG/1
500 TABLET, FILM COATED ORAL EVERY 6 HOURS PRN
Status: DISCONTINUED | OUTPATIENT
Start: 2022-11-30 | End: 2022-12-06 | Stop reason: HOSPADM

## 2022-11-30 RX ORDER — DIMENHYDRINATE 50 MG/ML
25 INJECTION, SOLUTION INTRAMUSCULAR; INTRAVENOUS
Status: DISCONTINUED | OUTPATIENT
Start: 2022-11-30 | End: 2022-11-30 | Stop reason: HOSPADM

## 2022-11-30 RX ORDER — HYDROMORPHONE HCL IN WATER/PF 6 MG/30 ML
0.2 PATIENT CONTROLLED ANALGESIA SYRINGE INTRAVENOUS
Status: DISCONTINUED | OUTPATIENT
Start: 2022-11-30 | End: 2022-11-30

## 2022-11-30 RX ADMIN — Medication 10 MG: at 13:54

## 2022-11-30 RX ADMIN — SODIUM CHLORIDE, POTASSIUM CHLORIDE, SODIUM LACTATE AND CALCIUM CHLORIDE: 600; 310; 30; 20 INJECTION, SOLUTION INTRAVENOUS at 07:47

## 2022-11-30 RX ADMIN — TRANEXAMIC ACID 10 MG/KG/HR: 100 INJECTION INTRAVENOUS at 08:06

## 2022-11-30 RX ADMIN — Medication 1 MCG/KG/HR: at 08:36

## 2022-11-30 RX ADMIN — MAGNESIUM SULFATE 2 G: 2 INJECTION INTRAVENOUS at 09:00

## 2022-11-30 RX ADMIN — Medication 20 MG: at 08:50

## 2022-11-30 RX ADMIN — ACETAMINOPHEN 975 MG: 325 TABLET, FILM COATED ORAL at 21:10

## 2022-11-30 RX ADMIN — FENTANYL CITRATE 50 MCG: 50 INJECTION, SOLUTION INTRAMUSCULAR; INTRAVENOUS at 09:03

## 2022-11-30 RX ADMIN — Medication 45 MCG: at 16:01

## 2022-11-30 RX ADMIN — FENTANYL CITRATE 100 MCG: 50 INJECTION, SOLUTION INTRAMUSCULAR; INTRAVENOUS at 07:56

## 2022-11-30 RX ADMIN — CEFAZOLIN 2 G: 1 INJECTION, POWDER, FOR SOLUTION INTRAMUSCULAR; INTRAVENOUS at 12:05

## 2022-11-30 RX ADMIN — SUCCINYLCHOLINE CHLORIDE 140 MG: 20 INJECTION, SOLUTION INTRAMUSCULAR; INTRAVENOUS; PARENTERAL at 07:57

## 2022-11-30 RX ADMIN — HYDROMORPHONE HYDROCHLORIDE 0.5 MG: 1 INJECTION, SOLUTION INTRAMUSCULAR; INTRAVENOUS; SUBCUTANEOUS at 11:39

## 2022-11-30 RX ADMIN — TRANEXAMIC ACID 2682 MG: 100 INJECTION INTRAVENOUS at 08:06

## 2022-11-30 RX ADMIN — FOLIC ACID 1 MG: 1 TABLET ORAL at 21:10

## 2022-11-30 RX ADMIN — HYDROXYZINE HYDROCHLORIDE 25 MG: 25 TABLET, FILM COATED ORAL at 23:33

## 2022-11-30 RX ADMIN — Medication 2 G: at 08:07

## 2022-11-30 RX ADMIN — Medication 10 MG: at 10:00

## 2022-11-30 RX ADMIN — DULOXETINE 120 MG: 60 CAPSULE, DELAYED RELEASE ORAL at 21:11

## 2022-11-30 RX ADMIN — SODIUM CHLORIDE, SODIUM LACTATE, POTASSIUM CHLORIDE, CALCIUM CHLORIDE: 600; 310; 30; 20 INJECTION, SOLUTION INTRAVENOUS at 08:45

## 2022-11-30 RX ADMIN — ESMOLOL HYDROCHLORIDE 20 MG: 10 INJECTION, SOLUTION INTRAVENOUS at 13:15

## 2022-11-30 RX ADMIN — ESMOLOL HYDROCHLORIDE 20 MG: 10 INJECTION, SOLUTION INTRAVENOUS at 12:54

## 2022-11-30 RX ADMIN — ESMOLOL HYDROCHLORIDE 40 MG: 10 INJECTION, SOLUTION INTRAVENOUS at 12:28

## 2022-11-30 RX ADMIN — PHENYLEPHRINE HYDROCHLORIDE 100 MCG: 10 INJECTION INTRAVENOUS at 08:12

## 2022-11-30 RX ADMIN — ESMOLOL HYDROCHLORIDE 20 MG: 10 INJECTION, SOLUTION INTRAVENOUS at 12:35

## 2022-11-30 RX ADMIN — SODIUM CHLORIDE, SODIUM LACTATE, POTASSIUM CHLORIDE, CALCIUM CHLORIDE: 600; 310; 30; 20 INJECTION, SOLUTION INTRAVENOUS at 13:58

## 2022-11-30 RX ADMIN — LIDOCAINE HYDROCHLORIDE 100 MG: 20 INJECTION, SOLUTION INFILTRATION; PERINEURAL at 07:56

## 2022-11-30 RX ADMIN — OXYCODONE HYDROCHLORIDE 10 MG: 10 TABLET ORAL at 21:11

## 2022-11-30 RX ADMIN — Medication 30 MG: at 08:09

## 2022-11-30 RX ADMIN — HYDROXYCHLOROQUINE SULFATE 200 MG: 200 TABLET, FILM COATED ORAL at 21:10

## 2022-11-30 RX ADMIN — SODIUM CHLORIDE: 9 INJECTION, SOLUTION INTRAVENOUS at 08:45

## 2022-11-30 RX ADMIN — DEXAMETHASONE SODIUM PHOSPHATE 6 MG: 4 INJECTION, SOLUTION INTRA-ARTICULAR; INTRALESIONAL; INTRAMUSCULAR; INTRAVENOUS; SOFT TISSUE at 09:04

## 2022-11-30 RX ADMIN — FENTANYL CITRATE 50 MCG: 50 INJECTION, SOLUTION INTRAMUSCULAR; INTRAVENOUS at 08:05

## 2022-11-30 RX ADMIN — Medication 2 G: at 16:24

## 2022-11-30 RX ADMIN — IPRATROPIUM BROMIDE AND ALBUTEROL SULFATE 3 ML: 2.5; .5 SOLUTION RESPIRATORY (INHALATION) at 06:27

## 2022-11-30 RX ADMIN — ALBUTEROL SULFATE 12 PUFF: 108 INHALANT RESPIRATORY (INHALATION) at 08:25

## 2022-11-30 RX ADMIN — ROPINIROLE HYDROCHLORIDE 0.5 MG: 0.25 TABLET, FILM COATED ORAL at 22:18

## 2022-11-30 RX ADMIN — LIDOCAINE HYDROCHLORIDE 1 MG/KG/HR: 8 INJECTION, SOLUTION INTRAVENOUS at 09:12

## 2022-11-30 RX ADMIN — Medication 10 MG: at 12:00

## 2022-11-30 RX ADMIN — FAMOTIDINE 20 MG: 20 TABLET ORAL at 21:11

## 2022-11-30 RX ADMIN — ESMOLOL HYDROCHLORIDE 20 MG: 10 INJECTION, SOLUTION INTRAVENOUS at 13:38

## 2022-11-30 RX ADMIN — ESMOLOL HYDROCHLORIDE 20 MG: 10 INJECTION, SOLUTION INTRAVENOUS at 13:25

## 2022-11-30 RX ADMIN — OMEPRAZOLE 40 MG: 20 CAPSULE, DELAYED RELEASE ORAL at 21:11

## 2022-11-30 RX ADMIN — Medication 10 MG: at 11:00

## 2022-11-30 RX ADMIN — MIDAZOLAM 2 MG: 1 INJECTION INTRAMUSCULAR; INTRAVENOUS at 07:47

## 2022-11-30 RX ADMIN — Medication 30 MG: at 08:52

## 2022-11-30 RX ADMIN — FENTANYL CITRATE 50 MCG: 50 INJECTION INTRAMUSCULAR; INTRAVENOUS at 18:37

## 2022-11-30 RX ADMIN — CEFAZOLIN SODIUM 2 G: 2 INJECTION, SOLUTION INTRAVENOUS at 23:59

## 2022-11-30 RX ADMIN — Medication 10 MG: at 08:16

## 2022-11-30 RX ADMIN — ALBUMIN HUMAN: 0.05 INJECTION, SOLUTION INTRAVENOUS at 12:15

## 2022-11-30 RX ADMIN — PROPOFOL 200 MG: 10 INJECTION, EMULSION INTRAVENOUS at 07:56

## 2022-11-30 RX ADMIN — NOREPINEPHRINE BITARTRATE 6.4 MCG: 1 INJECTION, SOLUTION, CONCENTRATE INTRAVENOUS at 08:14

## 2022-11-30 RX ADMIN — ALBUTEROL SULFATE 3 ML: 2.5 SOLUTION RESPIRATORY (INHALATION) at 13:10

## 2022-11-30 RX ADMIN — ESMOLOL HYDROCHLORIDE 20 MG: 10 INJECTION, SOLUTION INTRAVENOUS at 12:44

## 2022-11-30 RX ADMIN — HYDROMORPHONE HYDROCHLORIDE 0.5 MG: 1 INJECTION, SOLUTION INTRAMUSCULAR; INTRAVENOUS; SUBCUTANEOUS at 16:43

## 2022-11-30 RX ADMIN — CALCIUM GLUCONATE 1 G: 20 INJECTION, SOLUTION INTRAVENOUS at 22:19

## 2022-11-30 RX ADMIN — KETAMINE HYDROCHLORIDE 5 MG/HR: 100 INJECTION, SOLUTION, CONCENTRATE INTRAMUSCULAR; INTRAVENOUS at 18:39

## 2022-11-30 RX ADMIN — Medication 10 MG: at 13:00

## 2022-11-30 RX ADMIN — ONDANSETRON 4 MG: 2 INJECTION INTRAMUSCULAR; INTRAVENOUS at 18:02

## 2022-11-30 RX ADMIN — ALBUMIN HUMAN: 0.05 INJECTION, SOLUTION INTRAVENOUS at 13:24

## 2022-11-30 RX ADMIN — Medication 0.4 MCG/KG/MIN: at 08:12

## 2022-11-30 RX ADMIN — CYCLOBENZAPRINE 10 MG: 10 TABLET, FILM COATED ORAL at 21:11

## 2022-11-30 RX ADMIN — PROPOFOL 100 MCG/KG/MIN: 10 INJECTION, EMULSION INTRAVENOUS at 08:06

## 2022-11-30 RX ADMIN — ALBUTEROL SULFATE 2.5 MG: 2.5 SOLUTION RESPIRATORY (INHALATION) at 16:23

## 2022-11-30 RX ADMIN — BUSPIRONE HYDROCHLORIDE 30 MG: 10 TABLET ORAL at 22:18

## 2022-11-30 RX ADMIN — SODIUM CHLORIDE, POTASSIUM CHLORIDE, SODIUM LACTATE AND CALCIUM CHLORIDE: 600; 310; 30; 20 INJECTION, SOLUTION INTRAVENOUS at 20:10

## 2022-11-30 ASSESSMENT — ACTIVITIES OF DAILY LIVING (ADL)
ADLS_ACUITY_SCORE: 22
ADLS_ACUITY_SCORE: 34
ADLS_ACUITY_SCORE: 22
ADLS_ACUITY_SCORE: 34
ADLS_ACUITY_SCORE: 22

## 2022-11-30 ASSESSMENT — LIFESTYLE VARIABLES: TOBACCO_USE: 1

## 2022-11-30 ASSESSMENT — ENCOUNTER SYMPTOMS: SEIZURES: 0

## 2022-11-30 NOTE — ANESTHESIA PREPROCEDURE EVALUATION
Pre-Operative H & P     CC:  Preoperative exam to assess for increased cardiopulmonary risk while undergoing surgery and anesthesia.    Date of Encounter: 11/8/2022  Primary Care Physician:  Neena Tam     Reason for Visit: Flatback syndrome of thoracolumbar region; Lumbar spondylosis; Spinal stenosis of lumbar region with neurogenic claudication; Lumbar radiculopathy      HPI        Zayra Ramirez is a 53 y/o female who presents for pre-operative H&P in preparation for Posterior instrumented spinal fusion Thoracic 8 to sacrum, with bilateral pelvic fixation; Transforaminal lumbar interbody fusion with Erickson-Nixon osteotomy lumbar 1 to sacral 1 (5 levels); possible cement augmentation of screws; Use of Infuse bone morphogenetic protein Large kit and allograft with Philip Wilhelm MD & Henry Sands MD on 11/30/22 at [facility] for treatment of Flatback syndrome of thoracolumbar region; Lumbar spondylosis; Spinal stenosis of lumbar region with neurogenic claudication; Lumbar radiculopathy .        Patient is being evaluated for comorbid conditions of HTN, ROBERT w/ CPAP w/ supplemental O2, ILD, former tobacco use, anxiety, depression, PTSD, RLS, neuropathy, borderline personality, morbid obesity, GERD, TMJ syndrome, rheumatoid arthritis, chronic immunosuppression, chronic right shoulder pain, pseudogout.        Ms. Ramirez has a history of cervical and lumbar pathology. Currently, her lumbar symptoms are more pressing. She has had several spine surgeries (see below). She has exhausted all conservative measures and is no longer receiving adequate relief from epidural injections, and her stenosis continues to worsen. She considers her symptoms to be further worsening over time.  Unhappy with current symptoms, which she considers very disabling, and significantly limiting her quality of life. Recommended surgery would be a large multilevel posterior fusion spanning her  lower thoracic spine to pelvis. She now presents for the above procedure.        Spine Surgical Hx:      10/16/2017 - ACDF with plate fixation C3-C5 (2 levels); use of [employer] allograft (Dr. Rylan Monique, LADI).  [Implants: Medtronic Zevo plate].      08/08/2019 - SCS implantation ([facility]); complicated by epidural abscess MSSA culture positive, treated with drainage/laminectomy and IV antibiotics.  Thus, the SCS was removed.      08/19/2019 - Lamine[facility]omies T11-L[facility] ([facility]12[facility]L2[facility]      The patient is followed by Jennifer Bonilla MD in pulmonology at [facility]. She was last seen on 1/7/22. She has shortness of breath, hypoxemia, obstructive ventilatory defect on spirometry and abnormal CT CHEST 6/3/2016, most consistent with respiratory bronchiolitis. The process appears to have been going on since at least 11/2015, no improvement with steroids, CT CHEST 11/22/2020 with same findings. She quit smoking on 11/14/21. She has Obstructive sleep apnea, CPAP at 11 cm of water pressure with supplemental oxygen at 2-3 lpm.      PLAN:    Must not smoke    Continue off all steroid inhalers    CPAP at 11 cm of water with oxygen at 3 lpm    Albuterol nebulizer three times daily as needed/ albuterol inhaler as needed    Singulair 1 tablet daily oral-okay to hold off during winter months    Return to clinic in 1 year        History was obtained from patient & chart review. She is accompanied by her step-mother.      Hx of abnormal bleeding or anti-platelet use: denies    Menstrual history: No LMP recorded (lmp unknown). Patient has had a hysterectomy.:       Past Medical History  Past Medical History:   Diagnosis Date     Allergic rhinitis      Anemia      Arthritis      Asthma     copd     Dental abscess 08/2015     Depressive disorder      Gastroesophageal reflux disease      History of emphysema      Hoarseness      Hypertension      Obstructive sleep apnea      Other chronic pain      Respiratory bronchiolitis  interstitial lung disease (H)      Sleep apnea      Smoker 11/02/2015       Past Surgical History  Past Surgical History:   Procedure Laterality Date     CERVICAL FUSION       COLONOSCOPY       COLONOSCOPY N/A 02/06/2020    Procedure: COLONOSCOPY, WITH POLYPECTOMY AND BIOPSY;  Surgeon: Julian Mccullough MD;  Location:  GI     ENT SURGERY       ESOPHAGOSCOPY, GASTROSCOPY, DUODENOSCOPY (EGD), COMBINED N/A 02/06/2020    Procedure: ESOPHAGOGASTRODUODENOSCOPY (EGD);  Surgeon: Julian Mccullough MD;  Location:  GI     EXCISE LESION INTRAORAL Bilateral 10/03/2018    Procedure: EXCISE LESION INTRAORAL;  Wide Local Excision Of of Left Oral Cavity Ulcer;  Surgeon: Morro Mijares MD;  Location: [facility] OR     HC DRAIN SKIN ABSCESS SIMPLE/SINGLE  03/16/2012    Procedure:INCISION AND DRAINAGE, ABSCESS, SIMPLE; Surgeon:CHRISTIANO HANCOCK; Location: GI     HEAD & NECK SURGERY       HYSTERECTOMY       HYSTERECTOMY       INCISION AND DRAINAGE ABDOMEN WASHOUT, COMBINED       INJECT EPIDURAL CERVICAL N/A 10/14/2021    Procedure: Cervical 7- Thoracic 1 epidural steroid injection with fluoroscopy;  Surgeon: Tram Florian MD;  Location: [facility] OR     INJECT EPIDURAL LUMBAR Right 09/15/2020    Procedure: Lumbar5- sacral 1 epidural steroid injection with fluoroscopy;  Surgeon: Tram Florian MD;  [facility]     INJECT EPIDURAL LUMBAR Right 06/29/2021    Procedure: Lumbar 5 sacral 1 epidural steroid injection with fluoroscopy;  Surgeon: Tram Florian MD;  Location: [facility] OR     INJECT SACROILIAC JOINT Bilateral 06/16/2020    Procedure: Bilateral sacroiliac joint steroid injection with fluoroscopy;  Surgeon: Tram Florian MD;  [facility]     LAMINECTOMY THORACIC ONE LEVEL N/A 08/19/2019    Procedure: LAMINECTOMY, SPINE, THORACIC, 11-12 and Part of Lumbar 1, DRAINAGE OF EPIDURAL ABCESS, Epidural Drain Placement X 2;  Surgeon: Yadiel Beal MD;  Location: [facility] OR     LA PERCUT IMPLNT NEUROELECT,EPIDURAL  N/A 2019    Procedure: TRIAL, SPINAL CORD STIMULATOR WITH BOSTON SCIENTIFIC;  Surgeon: Sipple, Daniel Peter, DO;  Location: [facility];  Service: Pain     RADIO FREQUENCY ABLATION / DESTRUCTION OF SACROILOAC JOINT DORSAL PRIMARY RAMUS Bilateral 2019    Procedure: Bilateral lumbar radiofrequency ablation with fluoroscopy and intravenous sedation ( Lumbar 2,3,4,5 medial branch nerves for the bilateral lumbar3-4, 4-5 and 5-sacral1 joints.;  Surgeon: Tram Florian MD;  Location:  OR     RADIO FREQUENCY ABLATION / DESTRUCTION OF SACROILOAC JOINT DORSAL PRIMARY RAMUS Right 2020    Procedure: Right lumbar medial branch nerve radiofrequency ablation right L2,3,4,5 nerves supplying the right L3-4, L4-5 and L5-S1 facet joints;  Surgeon: Tram Florian MD;  Location: [facility] [place]     spinal cord stimulator  2019     spinal cord stimulator removal  2019       Prior to Admission Medications  No current outpatient medications on file.       Allergies  Allergies   Allergen Reactions     Bee Venom Anaphylaxis     Doxycycline Anaphylaxis     Patient thinks it may have been just nausea and vomiting, however unable to confirm  Other reaction(s): throat closes     Erythromycin      Other reaction(s): Vomiting       Hydrocodone-Acetaminophen Itching       Social History  Social History     Socioeconomic History     Marital status: Single     Spouse name: Not on file     Number of children: Not on file     Years of education: Not on file     Highest education level: Not on file   Occupational History     Not on file   Tobacco Use     Smoking status: Former     Packs/day: 1.00     Years: 30.00     Pack years: 30.00     Types: Cigarettes     Start date: 1996     Quit date: 2021     Years since quittin.0     Smokeless tobacco: Never   Vaping Use     Vaping Use: Former   Substance and Sexual Activity     Alcohol use: No     Drug use: Yes     Types: Marijuana     Comment: very rarely       Sexual activity: Never   Other Topics Concern     Parent/sibling w/ CABG, MI or angioplasty before 65F 55M? Not Asked   Social History Narrative     Not on file     Social Determinants of Health     Financial Resource Strain: Not on file   Food Insecurity: Not on file   Transportation Needs: Not on file   Physical Activity: Not on file   Stress: Not on file   Social Connections: Not on file   Intimate Partner Violence: Not on file   Housing Stability: Not on file       Family History  Family History   Problem Relation Age of Onset     Asthma Mother      Restless Leg Syndrome Mother      Other Cancer Father         base of tongue cancer at age ~65     Hypertension Father      Back Pain Father      Restless Leg Syndrome Father      Coronary Artery Disease Father      Restless Leg Syndrome Sister      Breast Cancer Maternal Aunt 55     Anesthesia Reaction No family hx of      Deep Vein Thrombosis (DVT) No family hx of      Thyroid Cancer No family hx of      Multiple endocrine neoplasia No family hx of        Review of Systems  The complete review of systems is negative other than noted in the HPI or here.     Anesthesia Evaluation   Pt has had prior anesthetic.     No history of anesthetic complications       ROS/MED HX  ENT/Pulmonary: Comment: Uses 3L O2 with CPAP at night    Uses albuterol daily (currently 1x/day). Frequency changes w/ seasons.    PFT 11/2020: Severe obstructive ventilatory defect with gas trapping and preserved diffusion capacity is most consistent with chronic obstructive asthma.    Followed by Jennifer Bonilla MD in pulmonology at [facility]. She was last seen on 1/7/22.    (+) sleep apnea, uses CPAP, tobacco use, Past use, Moderate Persistent, asthma Treatment: Inhaler daily,      Neurologic:     (+) peripheral neuropathy, - LEs, radiculopathy.  (-) no seizures and no CVA   Cardiovascular:     (+) hypertension-----    METS/Exercise Tolerance: 3 - Able to walk 1-2 blocks without stopping Comment: Has  been walking one block daily over past month.    Hematologic:  - neg hematologic  ROS  (-) history of blood clots and history of blood transfusion   Musculoskeletal: Comment: Flatback syndrome of thoracolumbar region; Lumbar spondylosis; Spinal stenosis of lumbar region with neurogenic claudication; Lumbar radiculopathy.    S/p ACDF with plate fixation C3-C5 in 2017    S/p Laminectomies T11-L1 in 2019    Right shoulder pseudogout, finished prednisone one week ago for flare.    Rheumatoid arthritis, followed by Dr. Saenz. Taking methotrexate.        GI/Hepatic:     (+) GERD, Asymptomatic on medication,  (-) liver disease   Renal/Genitourinary:  - neg Renal ROS  (-) renal disease   Endo:     (+) Obesity,  (-) Type II DM   Psychiatric/Substance Use: Comment: Borderline personality    PTSD      (+) psychiatric history anxiety, depression and bipolar     Infectious Disease:  - neg infectious disease ROS     Malignancy:  - neg malignancy ROS     Other:      (+) , H/O Chronic Pain,        LMP  (LMP Unknown)     Physical Exam  Constitutional: Awake, alert, cooperative, no apparent distress, and appears stated age.  Eyes: Pupils equal, round and reactive to light, extra ocular muscles intact, sclera clear, conjunctiva normal.  HENT: Normocephalic, oral pharynx with moist mucus membranes, good dentition. No goiter appreciated. No removable dental hardware.  Respiratory: Clear to auscultation bilaterally, no crackles or wheezing. No SOB when supine.  Cardiovascular: Regular rate and rhythm, normal S1 and S2, and no murmur noted.  Carotids +2, no bruits. No edema. Palpable pulses to radial, DP and PT arteries.   GI: Normal bowel sounds, soft, obese, non-tender, no masses palpated.    Lymph/Hematologic: No cervical lymphadenopathy and no supraclavicular lymphadenopathy.  Genitourinary:  deferred  Skin: Warm and dry.  No rashes.   Musculoskeletal: mildly limited extension ROM of neck. There is no redness, warmth, or swelling  of the joints. Gross motor strength is normal.    Neurologic: Awake, alert, oriented to name, place and time. Cranial nerves II-XII are grossly intact. Gait is normal. Ambulates from chair to exam table, seats self, lies supine and sits back up w/o assistance.  Neuropsychiatric: Calm, cooperative. Normal affect. Pleasant. Answers questions appropriately, follows commands w/o difficulty.        PRIOR LABS/DIAGNOSTIC STUDIES:    All labs and imaging personally reviewed      Thoracic and Lumbar spine MRI without contrast 9/19/22  Impression:   1. Multilevel thoracic spondylosis with left neuroforaminal stenosis  at T10-T11. No definite abnormality within the thoracic spinal cord or  vertebrae.   2. Moderate to severe spinal canal stenosis at the L2-L3 and L3-L4  secondary to focal lumbar spondylosis. Refer to FINDINGS for  additional characterization of these levels.  3. Moderate-to-severe neural foraminal stenosis on the right L2-L3 and  left L3-L4 and L4-L5.      Thoracic and Lumbar spine MRI without contrast 9/19/22  Impression:   1. Multilevel thoracic spondylosis with left neuroforaminal stenosis  at T10-T11. No definite abnormality within the thoracic spinal cord or  vertebrae.   2. Moderate to severe spinal canal stenosis at the L2-L3 and L3-L4  secondary to focal lumbar spondylosis. Refer to FINDINGS for  additional characterization of these levels.  3. Moderate-to-severe neural foraminal stenosis on the right L2-L3 and  left L3-L4 and L4-L5.    The patient's records and results personally reviewed by this provider.     Outside records reviewed from: Care Everywhere (provider notes @ [facility])    LAB/DIAGNOSTIC STUDIES TODAY:  BMP, CBC, T&S      WBC Count 4.0 - 11.0 10e3/uL 11.0  7.5  7.2  10.1  16.5 High   10.1  7.5      RBC Count 3.80 - 5.20 10e6/uL 4.01  3.73 Low   3.86  3.85  4.14  4.04  3.94     Hemoglobin 11.7 - 15.7 g/dL 13.1  12.0  12.0  12.2  13.1  12.6  12.5     Hematocrit 35.0 - 47.0 % 39.7  36.4  37.0   36.8  39.7  38.7  39.8     MCV 78 - 100 fL 99  98  96  96  96  96  101 High      MCH 26.5 - 33.0 pg 32.7  32.2  31.1  31.7  31.6  31.2  31.7     MCHC 31.5 - 36.5 g/dL 33.0  33.0  32.4  33.2  33.0  32.6  31.4 Low      RDW 10.0 - 15.0 % 14.7  15.3 High   14.2  14.5  14.4  15.3 High   15.1 High      Platelet Count 150 - 450 10e3/uL 292  304  308  285  338  279  326     % Neutrophils % 76     85       % Lymphocytes % 15     9       % Monocytes % 8     5       % Eosinophils % 1     1       % Basophils % 0     0       % Immature Granulocytes % 0     0       NRBCs per 100 WBC <1 /100 0     0       Absolute Neutrophils 1.6 - 8.3 10e3/uL 8.3     14.1 High        Absolute Lymphocytes 0.8 - 5.3 10e3/uL 1.6     1.4       Absolute Monocytes 0.0 - 1.3 10e3/uL 0.8     0.8       Absolute Eosinophils 0.0 - 0.7 10e3/uL 0.1     0.1       Absolute Basophils 0.0 - 0.2 10e3/uL 0.0     0.1       Absolute Immature Granulocytes <=0.4 10e3/uL 0.0     0.1       Absolute NRBCs 10e3/uL 0.0     0.0          Sodium 136 - 145 mmol/L 141  141    139 R  137 R  140 R      Potassium 3.4 - 5.3 mmol/L 3.8  4.1          Chloride 98 - 107 mmol/L 102  100          Carbon Dioxide (CO2) 22 - 29 mmol/L 28  30 High           Anion Gap 7 - 15 mmol/L 11  11    4 R  6 R  5 R     Urea Nitrogen 6.0 - 20.0 mg/dL 12.6  14.1          Creatinine 0.51 - 0.95 mg/dL 0.89  0.97 High   0.91 R  0.78 R  0.80 R  0.78 R  0.92 R     Calcium 8.6 - 10.0 mg/dL 9.5  9.6    8.2 Low  R  8.9 R  8.7 R     Glucose 70 - 99 mg/dL 91  91          GFR Estimate >60 mL/min/1.73m2 78  70 CM  76 CM  >90 CM  89 CM  >90               COVID testing scheduled on 11/28/22    Assessment      Zayra Ramirez is a 52 year old female seen as a PAC referral for risk assessment and optimization for anesthesia.    Plan/Recommendations  Pt will be optimized for the proposed procedure.  See below for details on the assessment, risk, and preoperative recommendations    NEUROLOGY  - No history of TIA, CVA or  "seizure    -Post Op delirium risk factors:  No risk identified    ENT  - No current airway concerns.  Will need to be reassessed day of surgery.  Mallampati: II  TM: > 3   - S/P C3-5 fusion, mildly limited extension ROM of neck    CARDIAC  - HTN, will hold lisinopril-hydrochlorothiazide DOS    - METS (Metabolic Equivalents)   Has been walking one block daily over past month.     Patient CANNOT perform 4 METS exercise without symptoms            Total Score: 1    Functional Capacity: Unable to complete 4 METS      RCRI-Low risk: Class 2 0.9% complication rate            Total Score: 1    RCRI: High Risk Surgery        PULMONARY  - ROBERT with CPAP. Uses 3L O2 with CPAP at night       - Moderate persistent asthma. Uses albuterol daily (currently 1x/day). Frequency changes w/ seasons.  - Followed by Jennifer Bonilla MD in [facility]ology at [facility]. She was last seen on 1/7/22.  - PFT 11/2020: Severe obstructive ventilatory defect with gas trapping and preserved diffusion capacity is most consistent with chronic obstructive asthma.  - Duoneb ordered for DOS.    - Tobacco History      History   Smoking Status     Former     Packs/day: 1.00     Years: 30.00     Types: Cigarettes     Start date: 1/1/1996     Quit date: 11/14/2021   Smokeless Tobacco     Never       GI  - GERD, asymptomatic on prilosec  PONV High Risk  Total Score: 3           1 AN PONV: Pt is Female    1 AN PONV: Patient is not a current smoker    1 AN PONV: Intended Post Op Opioids          ENDOCRINE    - BMI: Estimated body mass index is 33.28 kg/m  as calculated from the following:    Height as of an earlier encounter on 11/30/22: 1.626 m (5' 4.02\").    Weight as of an earlier encounter on 11/30/22: 88 kg (194 lb 0.1 oz).  Obesity (BMI >30)  - No history of Diabetes Mellitus    HEME  VTE Low Risk 0.26%            Total Score: 0      - No history of abnormal bleeding or antiplatelet use.      MSK  Patient is NOT Frail            Total Score: 0      - Flatback " syndrome of thoracolumbar region; Lumbar spondylosis; Spinal stenosis of lumbar region with neurogenic claudication; Lumbar radiculopathy.  - S/p ACDF with plate fixation C3-C5 in 2017  - S/p Laminectomies T11-L1 in 2019  - Right shoulder pseudogout, finished prednisone one week ago for flare.  - Rheumatoid arthritis, followed by Dr. Saenz. Taking methotrexate (last dose 11/14/22).  - Recommend careful positioning throughout the perioperative period to prevent injury or exacerbation of pain.        PSYCH  - Anxiety, depression, bipolar  - Borderline personality  - PTSD      The patient is optimized for their procedure. AVS with information on surgery time/arrival time, meds and NPO status given by nursing staff. No further diagnostic testing indicated.    Final plan per anesthesiologist on day of surgery.        On the day of service:     Prep time: 18 minutes  Visit time: 25 minutes  Documentation time: 16 minutes  ------------------------------------------  Total time: 59 minutes      Mai Sotomayor PA-C  Preoperative Assessment Center  [facility]  Phone: [phone]  Fax: [phone]           Anesthesia Plan    ASA Status:  3   NPO Status:  NPO Appropriate    Anesthesia Type: General.     - Airway: ETT   Induction: Propofol.   Maintenance: Balanced.   Techniques and Equipment:     - Lines/Monitors: 2nd IV, Arterial Line     - Drips/Meds: Fentanyl, Phenylephrine, Ketamine     Consents    Anesthesia Plan(s) and associated risks, benefits, and realistic alternatives discussed. Questions answered and patient/representative(s) expressed understanding.    - Discussed:     - Discussed with:  Patient      - Extended Intubation/Ventilatory Support Discussed: No.      - Patient is DNR/DNI Status: No    Use of blood products discussed: Yes.     - Discussed with: Patient.     - Consented: consented to blood products            Reason for refusal: other.     Postoperative  Care    Pain management: IV analgesics, Oral pain medications, Multi-modal analgesia.   PONV prophylaxis: Ondansetron (or other 5HT-3), Background Propofol Infusion     Comments:                  CBC RESULTS: Recent Labs   Lab Test 11/08/22  1034 09/09/22  0944 07/15/22  0620 07/14/22  0729   WBC 11.0 7.5 7.2 10.1   HGB 13.1 12.0 12.0 12.2   HCT 39.7 36.4 37.0 36.8    304 308 285     Last Comprehensive Metabolic Panel:  Recent Labs   Lab Test 11/30/22  0613 11/08/22  1034 09/09/22  0944 07/28/22  1519 07/15/22  0620 07/14/22  1528 07/14/22  0729 07/14/22  0109 07/13/22  1900   NA  --  141 141  --   --   --  139  --  137   POTASSIUM  --  3.8 4.1  --  4.4 3.8 3.0*   < > 2.7*   CHLORIDE  --  102 100  --   --   --  107  --  101   CO2  --  28 30*  --   --   --  28  --  30   ANIONGAP  --  11 11  --   --   --  4  --  6   BUN  --  12.6 14.1  --   --   --  14  --  16   CR  --  0.89 0.97* 0.91 0.78  --  0.80  --  0.78   GFRESTIMATED  --  78 70 76 >90  --  89  --  >90   * 91 91  --   --   --  101*  --  99   NATALIYA  --  9.5 9.6  --   --   --  8.2*  --  8.9    < > = values in this interval not displayed.             Mary Gill MD

## 2022-11-30 NOTE — H&P
MEDICAL ICU H&P  11/30/2022    Date of Hospital Admission: 11/30/22  Date of ICU Admission: 11/30/22  Reason for Critical Care Admission: Posterior spinal fusion of T8 to S1 and bilateral pelvic fixation and fusion of L1 to S1  Date of Service (when I saw the patient): 11/30/2022    ASSESSMENT: Zayra Ramirez is a 52 year old female with PMH HTN, ROBERT w/ CPAP w/ supplemental O2, ILD, former tobacco use with cessation in 11/2021, anxiety, depression, borderline personality disorder, PTSD, restless leg syndrome, neuropathy, morbid obesity, GERD, TMJ syndrome, rheumatoid arthritis, chronic immunosuppression, chronic right shoulder pain, pseudogout, and previous other spinal surgeries who was admitted on 11/30/2022 for a posterior instrumented spinal fusion of their T8 to S1 with bilateral pelvic fixation and transforaminal lumbar fusion from L1 to S1.    The patient has had previous spinal surgeries since 2017, including: an anterior cervical discectomy and fusion (ACDF) in 10/2017, spinal cord stimulator implantation in 08/2019, that was complicated by an MSSA epidural abscess, and eventually removed, and T11-L2 laminectomies in 08/2019.     CHANGES and MAJOR THINGS TODAY:   1. Posterior spinal fusion of T8 to S1 and bilateral pelvic fixation and fusion of L1 to S1  2. Maintain adequate pain coverage    PLAN:    Neuro:  # Pain and sedation  # Hx restless leg syndrome  # Hx Anxiety and Depression  # Hx borderline personality disorder  # Hx PTSD  - Start APAP 975mg PO q8 hrs  - Continue cyclobenziprine 10mg PO at bedtime  - Start fentanyl 25-50mcg IV q1 hrs PRN  - Start oxycodone 5-10mg PO q4 hrs PRN   - Continue PTA pregabalin 150mg PO BID   - Continue PTA ropinirole 0.5mg PO at bedtime  - Start voltaren topical gel 4 times PRN daily  - Continue PTA duloxetine 120mg PO at bedtime  - Continue PTA aripiprazole 5mg PO qDay  - Continue PTA buspirone 30mg PO BID  - Continue post-op ketamine infusion 5-10 mg/hr    - RASS  goal 0    Pulmonary:  # ROBERT with home CPAP  # ILD  - Continue PTA albuterol inhaler 2 puffs q6 hrs PRN  - Continue PTA albuterol neb 3ml q5 hrs PRN  - Continue home CPAP with patient's home settings  - Maintain O2 saturation greater than 92%    Cardiovascular:  # Hx HTN  - Continue PTA lisinopril/hydrochlorothiazide 20-25mg PO qDay  - Maintain SBP greater than 90 mmHg  - Maintain MAP goal greater than 65 mmhg    GI/Nutrition:  # Hx GERD  # Hx class III obesity  - Continue PTA omeprazole 40mg PO qDay  - Hold PTA liraglutide   - Start Senna 1-2 tabs PO BID  - Start Miralax PO qDay  - Start dulcolax 10mg OK qDay PRN   - Clear liquid diet once bedside swallow eval is passed and ADAT    Renal/Fluids/Electrolytes:  # No acute issues  - Start Potassium/Magnesium/Phosphorus replacement protocols  - BMP, MG, and Phos on admission and in AM   - Start LR IV infusion at 75 ml/hr until taking adequate PO  - Continue home vitamins    Endocrine:  # No acute issues  - Monitor BG with morning labs     ID:  # Hx rheumatoid arthritis  - Continue PTA hydroxychloroquine 200mg PO BID unless specified by surgical team  - Continue PTA methotrexate 2.5mg PO qMonday unless specified by surgical team    Hematology:    # Acute blood loss anemia  - Preop hbg 12.1 on 11/30  - Preop platelets 292 on 11/08  - Transfuse if hgb less than 7.0  - CBC on admission to ICU and in AM     Musculoskeletal:  # Hx TMJ syndrome  # Hx rheumatoid arthritis  - Monitor for acute pain   - PT/OT to eval and treat    Skin:  # Surgical incision  - Surgical site care to be indicated by surgical team    General Cares/Prophylaxis:    DVT Prophylaxis: Pneumatic Compression Devices  GI Prophylaxis: PPI  Restraints: Not indicated  Family Communication: Updated by surgical team, no need from ICU team on admission  Code Status: Full code    Lines/tubes/drains:  - PIV x2  - Hicks catheter  - Arterial line L radial  - Hemovac drain x2    Disposition:  - [facility] ICU      Patient seen and findings/plan discussed with medical ICU staff, Dr. Fernandez.    Time spent on this Encounter   Billing:  I spent 27 minutes bedside and on the inpatient unit today managing the critical care of Zayra Ramirez in relation to the issues listed in this note separate from procedures.    NICKO Lama CNP    -----------------------------------------------------------------------    HISTORY PRESENTING ILLNESS:   The patient was admitted to the ICU from PACU on the evening of 11/30 for a posterior instrumented spinal fusion to their T8 to S1 with bilateral pelvic fixation and transforaminal lumbar fusion from L1 to S1.    The patient arrived extubated and on supplemental oxygen via face mask, oriented x4, following commands, hemodynamically stable, was not verbalizing increased levels of pain on arrival, and arrived on a ketamine infusion for continued pain coverage. Patient has hemovacs x2 to her posterior spine with dark blood within the accordion chamber. See above for plan.    REVIEW OF SYSTEMS:   Review Of Systems  Skin: negative  Eyes: negative  Ears/Nose/Throat: positive for hoarseness  Respiratory: No shortness of breath, dyspnea on exertion, cough, or hemoptysis  Cardiovascular: negative and positive for as above  Gastrointestinal: positive for as above  Genitourinary: positive for bond catheter in place  Musculoskeletal: positive for soreness to surgical site and as above  Neurologic: positive for as above  Psychiatric: positive for as above, anxiety and depression  Hematologic/Lymphatic/Immunologic: positive for as above  Endocrine: negative      PAST MEDICAL HISTORY:   Past Medical History:   Diagnosis Date     Allergic rhinitis      Anemia      Arthritis      Asthma     copd     Dental abscess 08/2015     Depressive disorder      Gastroesophageal reflux disease      History of emphysema      Hoarseness      Hypertension      Obstructive sleep apnea      Other chronic pain       Respiratory bronchiolitis interstitial lung disease (H)      Sleep apnea      Smoker 11/02/2015     SURGICAL HISTORY:  Past Surgical History:   Procedure Laterality Date     CERVICAL FUSION       COLONOSCOPY       COLONOSCOPY N/A 02/06/2020    Procedure: COLONOSCOPY, WITH POLYPECTOMY AND BIOPSY;  Surgeon: Julian Mccullough MD;  Location:  GI     ENT SURGERY       ESOPHAGOSCOPY, GASTROSCOPY, DUODENOSCOPY (EGD), COMBINED N/A 02/06/2020    Procedure: ESOPHAGOGASTRODUODENOSCOPY (EGD);  Surgeon: Julian Mccullough MD;  Location:  GI     EXCISE LESION INTRAORAL Bilateral 10/03/2018    Procedure: EXCISE LESION INTRAORAL;  Wide Local Excision Of of Left Oral Cavity Ulcer;  Surgeon: Morro Mijares MD;  Location: [facility] OR     HC DRAIN SKIN ABSCESS SIMPLE/SINGLE  03/16/2012    Procedure:INCISION AND DRAINAGE, ABSCESS, SIMPLE; Surgeon:CHRISTIANO HANCOCK; Location: GI     HEAD & NECK SURGERY       HYSTERECTOMY       HYSTERECTOMY       INCISION AND DRAINAGE ABDOMEN WASHOUT, COMBINED       INJECT EPIDURAL CERVICAL N/A 10/14/2021    Procedure: Cervical 7- Thoracic 1 epidural steroid injection with fluoroscopy;  Surgeon: Tram Florian MD;  Location: [facility] OR     INJECT EPIDURAL LUMBAR Right 09/15/2020    Procedure: Lumbar5- sacral 1 epidural steroid injection with fluoroscopy;  Surgeon: Tram Florian MD;  [facility]     INJECT EPIDURAL LUMBAR Right 06/29/2021    Procedure: Lumbar 5 sacral 1 epidural steroid injection with fluoroscopy;  Surgeon: Tram Florian MD;  Location: [facility] OR     INJECT SACROILIAC JOINT Bilateral 06/16/2020    Procedure: Bilateral sacroiliac joint steroid injection with fluoroscopy;  Surgeon: Tram Florian MD;  [facility]     LAMINECTOMY THORACIC ONE LEVEL N/A 08/19/2019    Procedure: LAMINECTOMY, SPINE, THORACIC, 11-12 and Part of Lumbar 1, DRAINAGE OF EPIDURAL ABCESS, Epidural Drain Placement X 2;  Surgeon: Yadiel Beal MD;  Location: [facility] OR     KS PERCUT IMPLNT  NEUROELECT,EPIDURAL N/A 2019    Procedure: TRIAL, SPINAL CORD STIMULATOR WITH [employer]ENTIFIC;  Surgeon: Sipple, Daniel Peter, DO;  Location: [facility];  Service: Pain     RADIO FREQUENCY ABLATION / DESTRUCTION OF SACROILOAC JOINT DORSAL PRIMARY RAMUS Bilateral 2019    Procedure: Bilateral lumbar radiofrequency ablation with fluoroscopy and intravenous sedation ( Lumbar 2,3,4,5 medial branch nerves for the bilateral lumbar3-4, 4-5 and 5-sacral1 joints.;  Surgeon: Tram Florian MD;  Location:  OR     RADIO FREQUENCY ABLATION / DESTRUCTION OF SACROILOAC JOINT DORSAL PRIMARY RAMUS Right 2020    Procedure: Right lumbar medial branch nerve radiofrequency ablation right L2,3,4,5 nerves supplying the right L3-4, L4-5 and L5-S1 facet joints;  Surgeon: Tram Florian MD;  Location: [facility] [place]     spinal cord stimulator  2019     spinal cord stimulator removal  2019     SOCIAL HISTORY:  Social History     Socioeconomic History     Marital status: Single     Spouse name: None     Number of children: None     Years of education: None     Highest education level: None   Tobacco Use     Smoking status: Former     Packs/day: 1.00     Years: 30.00     Pack years: 30.00     Types: Cigarettes     Start date: 1996     Quit date: 2021     Years since quittin.0     Smokeless tobacco: Never   Vaping Use     Vaping Use: Former   Substance and Sexual Activity     Alcohol use: No     Drug use: Yes     Types: Marijuana     Comment: very rarely      Sexual activity: Never     FAMILY HISTORY:   Family History   Problem Relation Age of Onset     Asthma Mother      Restless Leg Syndrome Mother      Other Cancer Father         base of tongue cancer at age ~65     Hypertension Father      Back Pain Father      Restless Leg Syndrome Father      Coronary Artery Disease Father      Restless Leg Syndrome Sister      Breast Cancer Maternal Aunt 55     Anesthesia Reaction No family hx of      Deep  Vein Thrombosis (DVT) No family hx of      Thyroid Cancer No family hx of      Multiple endocrine neoplasia No family hx of      ALLERGIES:   Allergies   Allergen Reactions     Bee Venom Anaphylaxis     Doxycycline Anaphylaxis     Patient thinks it may have been just nausea and vomiting, however unable to confirm  Other reaction(s): throat closes     Erythromycin      Other reaction(s): Vomiting       Hydrocodone-Acetaminophen Itching     MEDICATIONS:  Lidocaine 1 % injection 0.5-5 mL  sodium chloride (PF) 0.9% PF flush 5-50 mL    albuterol (PROAIR HFA/PROVENTIL HFA/VENTOLIN HFA) 108 (90 Base) MCG/ACT inhaler, Inhale 2 puffs into the lungs every 6 hours as needed for shortness of breath / dyspnea or wheezing Ventolin please  ARIPiprazole (ABILIFY) 5 MG tablet, Take 5 mg by mouth daily  Black Pepper-Turmeric (TURMERIC CURCUMIN) 5-1000 MG CAPS, Take 1 capsule by mouth daily  busPIRone HCl (BUSPAR) 30 MG tablet, Take 30 mg by mouth 2 times daily   carboxymethylcellulose (CARBOXYMETHYLCELLULOSE SODIUM) 0.5 % SOLN ophthalmic solution, Place 1 drop into both eyes 3 times daily as needed for dry eyes  celecoxib (CELEBREX) 200 MG capsule, Take 1 capsule (200 mg) by mouth every morning  cyclobenzaprine (FLEXERIL) 10 MG tablet, Take 1 tablet (10 mg) by mouth At Bedtime  DULoxetine (CYMBALTA) 60 MG capsule, Take 120 mg by mouth At Bedtime   EPINEPHrine (EPIPEN/ADRENACLICK/OR ANY BX GENERIC EQUIV) 0.3 MG/0.3ML injection 2-pack, Inject 0.3 mg into the muscle as needed  folic acid (FOLVITE) 1 MG tablet, Take 1 tablet (1 mg) by mouth daily (Patient taking differently: Take 1 mg by mouth At Bedtime Skips Sunday nights)  insulin pen needle (32G X 4 MM) 32G X 4 MM miscellaneous, Use 1 NEEDLE daily to use with Saxenda  insulin pen needle (32G X 6 MM) 32G X 6 MM miscellaneous, Use daily pen needles daily or as directed.  lisinopril-hydrochlorothiazide (ZESTORETIC) 20-25 MG tablet, Take 1 tablet by mouth daily  montelukast (SINGULAIR)  10 MG tablet, Take 10 mg by mouth daily  OXYGEN-HELIUM IN, 3L @ night    Oxygen only not helium  potassium 99 MG TABS, Take 1 tablet by mouth daily  Respiratory Therapy Supplies ([facility] CPAP FILTER) MISC,   vitamin D3 (CHOLECALCIFEROL) 250 mcg (90959 units) capsule, Take 1 capsule by mouth daily  zinc sulfate (ZINCATE) 220 (50 Zn) MG capsule, Take 220 mg by mouth daily (with lunch)   methotrexate sodium 2.5 MG TABS, Take 10 tablets (25 mg) by mouth once a week MONDAYS        PHYSICAL EXAMINATION:  Temp:  [97.9  F (36.6  C)] 97.9  F (36.6  C)  Pulse:  [102] 102  Resp:  [15] 15  BP: (120)/(82) 120/82  SpO2:  [95 %] 95 %    Exam:  Constitutional: healthy, alert, no distress and cooperative  Head: Normocephalic. No masses, lesions, tenderness or abnormalities  Neck: Neck supple. No adenopathy. Thyroid symmetric, normal size,  Cardiovascular: negative  Respiratory: negative  Gastrointestinal: Abdomen soft, non-tender. BS normal. No masses, organomegaly  : Deferred  Musculoskeletal: extremities normal- no gross deformities noted and tender to palpation posterior spine  Skin: Incision present and covered with surgical dressing  Neurologic: negative  Psychiatric: mentation appears normal and affect normal/bright    LABS: Reviewed.   Arterial Blood Gases   Recent Labs   Lab 11/30/22  1332 11/30/22  1220 11/30/22  1100 11/30/22  0924   PH 7.34* 7.35 7.37 7.41   PCO2 49* 50* 47* 47*   PO2 142* 100 169* 106*   HCO3 26 28 27 29*     Complete Blood Count   Recent Labs   Lab 11/30/22  1332 11/30/22  1220 11/30/22  1100 11/30/22  0924   HGB 9.9* 10.7* 11.0* 12.1     Basic Metabolic Panel  Recent Labs   Lab 11/30/22 1958 11/30/22  1332 11/30/22  1220 11/30/22  1100 11/30/22  0924   NA  --  139 140 140 140   POTASSIUM  --  3.2* 3.3* 3.0* 3.1*   * 129* 126* 106* 105*     Liver Function Tests  No lab results found in last 7 days.  Coagulation Profile  No lab results found in last 7 days.    IMAGING:  Recent  Results (from the past 24 hour(s))   XR Surgery SEDRICK L/T 5 Min Fluoro    Narrative    This exam was marked as non-reportable because it will not be read by a   radiologist or a [place] non-radiologist provider.

## 2022-11-30 NOTE — ANESTHESIA PROCEDURE NOTES
Arterial Line Procedure Note    Pre-Procedure   Staff -        Anesthesiologist:  Mary Gill MD       Performed By: anesthesiologist       Location: OR       Pre-Anesthestic Checklist: patient identified, IV checked, risks and benefits discussed, informed consent, monitors and equipment checked, pre-op evaluation and at physician/surgeon's request  Timeout:       Correct Patient: Yes        Correct Procedure: Yes        Correct Site: Yes        Correct Position: Yes   Line Placement:   This line was placed Post Induction  Procedure   Procedure: arterial line       Laterality: left       Insertion Site: radial.  Sterile Prep        Standard elements of sterile barrier followed       Skin prep: Chloraprep  Insertion/Injection        Technique: ultrasound guided and Seldinger Technique        1. Ultrasound was used to evaluate the access site.       2. Artery evaluated via ultrasound for patency/adequacy.       3. Using real-time ultrasound the needle/catheter was observed entering the artery/vein.       Catheter Type/Size: 20 G, 1.75 in/4.5 cm quick cath (integral wire)  Narrative         Secured by: suture       Tegaderm dressing used.       Complications: None apparent,        Arterial waveform: Yes        IBP within 10% of NIBP: Yes

## 2022-11-30 NOTE — ANESTHESIA PROCEDURE NOTES
Airway       Patient location during procedure: OR       Procedure Start/Stop Times: 11/30/2022 7:59 AM  Staff -        CRNA: Marc Thacker APRN CRNA       Performed By: CRNA  Consent for Airway        Urgency: elective  Indications and Patient Condition       Indications for airway management: estela-procedural       Induction type:intravenous       Mask difficulty assessment: 1 - vent by mask    Final Airway Details       Final airway type: endotracheal airway       Successful airway: ETT - single and Oral  Endotracheal Airway Details        ETT size (mm): 7.0       Cuffed: yes       Successful intubation technique: video laryngoscopy       VL Blade Size: MAC 3       Grade View of Cords: 1       Adjucts: stylet       Position: Right       Measured from: lips       Secured at (cm): 21       Bite block used: Soft    Post intubation assessment        Placement verified by: capnometry, equal breath sounds and chest rise        Number of attempts at approach: 1       Number of other approaches attempted: 0       Secured with: silk tape       Ease of procedure: easy       Dentition: Intact and Unchanged    Medication(s) Administered   Medication Administration Time: 11/30/2022 7:59 AM    Additional Comments       Grade 1 view with direct laryngoscopy and with videoscope, bilateral soft bite blocks and midline bite block placed

## 2022-11-30 NOTE — BRIEF OP NOTE
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]  Lumbar spondylosis [M47.816]  Spinal stenosis of lumbar region with neurogenic claudication [M48.062]  Lumbar radiculopathy [M54.16]  Post-operative diagnosis Same as pre-operative diagnosis    Procedure: Procedure(s):  Posterior Instrumented Spinal Fusion Thoracic 8 to Sacrum with Bilateral Pelvic Fixation; Transforaminal Lumbar Interbody Fusion with Erickson-Nixon Osteotomy Lumbar 1 to Sacral 1 (5 levels); Possible Cement Augmentation of Screws; Use of Infuse Bone Morphogenetic Protein Large Kit and Allograft  Surgeon: Surgeon(s) and Role:     * Philip Wilhelm MD - Primary     * Henry Sands MD - Assisting     * Bucky Garrett MD - Resident - Assisting  Anesthesia: General   Estimated Blood Loss: 1100 ml    Drains: Hemovac x2  Specimens: * No specimens in log *  Findings:   None.  Complications: None.  Implants:   Implant Name Type Inv. Item Serial No.  Lot No. LRB No. Used Action   IMP SCR MEDT 5.5/6.0MM SOLERA 6.5X45MM MA [id number] - [id number] Metallic Hardware/Ruth IMP SCR MEDT 5.5/6.0MM SOLERA 6.5X45MM MA [id number]  [employer] [id number] N/A 1 Implanted   IMP SCR MEDT 5.5/6.0MM SOLERA 6.5X50MM MA [id number] - [id number] Metallic Hardware/Ruth IMP SCR MEDT 5.5/6.0MM SOLERA 6.5X50MM MA [id number]  [employer] [id number] N/A 8 Implanted   IMP SPNL IFUSE [place] GRANITE 10.5 MM X 90 MM [id number] - [id number] Metallic Hardware/Ruth IMP SPNL IFUSE [place] GRANITE 10.5 MM X 90 MM [id number]  [employer] [id number] N/A 3 Implanted   IMP SPNL IFUSE B[place] GRANITE 10.5 MM X 100 MM [id number] - [id number] Metallic Hardware/Ruth IMP SPNL IFUSE [place] GRANITE 10.5 MM X 100 MM [id number]  [employer] [id number] N/A 1 Implanted   GRAFT BONE INFUSE BMP  [id number] - [id number]  GRAFT BONE INFUSE BMP  [id number]  [employer] [id number] N/A 1 Implanted   IMP SCR SET MEDT SOLERA BREAK OFF 5.5MM TI [id number] - [id number] Metallic Hardware/[place] IMP SCR SET MEDT SOLERA BREAK OFF 5.5MM TI [id number]  [employer]  N/A 24 Implanted   IMP SCR MEDT 5.5/6.0MM SOLERA 6.5X50MM MA [id number] - [id number] Metallic Hardware/[place] IMP SCR MEDT 5.5/6.0MM SOLERA 6.5X50MM MA [id number]  [employer]  N/A 1 Implanted   IMP SCR MEDT 5.5/6.0MM SOLERA 5.5X50MM MA [id number] - [id number] Metallic Hardware/[place] IMP SCR MEDT 5.5/6.0MM SOLERA 5.5X50MM MA [id number]  [employer]  N/A 1 Implanted   IMP SCR MEDT 5.5/6.0MM SOLERA 5.5X45MM MA [id number] - [id number] Metallic Hardware/[place] IMP SCR MEDT 5.5/6.0MM SOLERA 5.5X45MM MA [id number]  [employer]  N/A 3 Implanted   IMP SCR MEDT 5.5/6.0MM SOLERA 6.5X40MM MA [id number] - [id number] Metallic Hardware/[place] IMP SCR MEDT 5.5/6.0MM SOLERA 6.5X40MM MA [id number]  [employer]  N/A 1 Implanted   IMP SCR MEDT 5.5/6.0MM SOLERA 5.0X40MM MA [id number] - [id number] Metallic Hardware/[place] IMP SCR MEDT 5.5/6.0MM SOLERA 5.0X40MM MA [id number]  [employer]  N/A 1 Implanted   IMP SCR MEDT 5.5/6.0MM SOLERA 7.5X50MM MA [id number] - [id number] Metallic Hardware/[place] IMP SCR MEDT 5.5/6.0MM SOLERA 7.5X50MM MA [id number]  [employer]  N/A 4 Implanted   IMP SCR MEDT 5.5/6.0MM SOLERA 7.5X40MM MA [id number] - [id number] Metallic Hardware/[place] IMP SCR MEDT 5.5/6.0MM SOLERA 7.5X40MM MA [id number]  [employer]  N/A 2 Implanted   CAPSTONE SPACER 10 X 22 MM, 12 DEG    [employer] [id number] N/A 1 Implanted   CAPSONE SPACER 11 X 22 MM, 12 DEG    [employer] [id number] N/A 1 Implanted   IMP PLATE BIOM CYPRUS 1LVL 22MM [id number] - [id number] Metallic Hardware/[place] IMP PLATE BIOM CYPRUS 1LVL 22MM [id number]  [employer] [id number] N/A 1 Implanted   IMP PLATE BIOM CYPRUS 1LVL 22MM [id number] - [id number] Metallic Hardware/[place] IMP PLATE BIOM CYPRUS 1LVL 22MM [id number]  [employer] [id number] N/A 1 Implanted   CAPSTONE  SPACER 11 X 22 MM, 6 DEG    [employer] [id number] N/A 2 Implanted   IMP PLATE BIOM CYPRUS 1LVL 22MM [id number] - [id number] Metallic Hardware/[place] IMP PLATE BIOM CYPRUS 1LVL 22MM 8343468  [employer] [id number] N/A 1 Implanted   IMP PLATE BIOM CYPRUS 1LVL 22MM 6999038 - [id number] Metallic Hardware/[place] IMP PLATE BIOM CYPRUS 1LVL 22MM 3512432  [employer] [id number] N/A 1 Implanted   10 x 22mm    MED[employer]IC [id number] N/A 2 Implanted   IMP PLATE BIOM CYPRUS 1LVL 22MM 3653555 - [id number] Metallic Hardware/[place] IMP PLATE BIOM CYPRUS 1LVL 22MM 8725290  [employer] [id number] N/A 2 Implanted   GRAFT BONE MAGNIFUSE [id number] - [id number] Bone/Tissue/Biologic GRAFT BONE MAGNIFUSE [id number] [employer]  N/A 1 Implanted   30cc crushed   [id number]   N/A 1 Implanted   30 cc   [id number]   N/A 1 Implanted   IMP BRE MEDT SOLERA LINED 5.6J869GB CHR [id number] - [id number] Metallic Hardware/[place] IMP BRE MEDT SOLERA LINED [id number] CHR [id number]  [employer]  N/A 1 Implanted   UnidPlan      N/A 1 Implanted       Assessment and Plan: Zayra Ramirez is a 52 year old female now s/p PISF T8 - pelvis; TLIF L1 S1 on 11/30 with Dr. Wilhelm.     [place] Primary  Activity: Up with assist until independent. No excessive bending or twisting. No lifting >10 lbs x 6 weeks. Encourage ambulation. If James lift needed, use high back sling.   Weight bearing status: WBAT.  Pain management: Transition from IV to PO as tolerated.    Antibiotics: Ancef x 24 hours.  Diet: Begin with clear fluids and progress diet as tolerated. Protein supplements TID.  DVT prophylaxis: SCDs only. No chemical DVT ppx needed at discharge.  Imaging: XR Upright scoliosis PTDC - ordered.  Labs: Hgb POD#1 + 2.  Bracing/Splinting: [place] - ordered.  Dressings: Keep Aquacel c/d/i x 7 days.  Drains: Document output per shift, will be discontinued at Orthopedic Surgery discretion.  Hicks catheter: Remove POD#1.   Physical  Therapy/Occupational Therapy: Eval and treat.  Cultures: Pending, follow culture results closely.    Consults: Medicine.  Follow-up: Clinic with Dr. Wilhelm in 6 weeks with standing scoliosis x-rays needed.   Disposition: Pending progress with therapies, pain control on orals, and medical stability, anticipate discharge on POD #2-3.    Bucky Garrett MD   PGY1 - Orthopaedic Surgery

## 2022-12-01 ENCOUNTER — APPOINTMENT (OUTPATIENT)
Dept: PHYSICAL THERAPY | Facility: CLINIC | Age: 52
DRG: 454 | End: 2022-12-01
Payer: COMMERCIAL

## 2022-12-01 LAB
ALBUMIN SERPL-MCNC: 2.9 G/DL (ref 3.4–5)
ALP SERPL-CCNC: 60 U/L (ref 40–150)
ALT SERPL W P-5'-P-CCNC: 57 U/L (ref 0–50)
ANION GAP SERPL CALCULATED.3IONS-SCNC: 5 MMOL/L (ref 3–14)
AST SERPL W P-5'-P-CCNC: 110 U/L (ref 0–45)
BILIRUB DIRECT SERPL-MCNC: 0.1 MG/DL (ref 0–0.2)
BILIRUB SERPL-MCNC: 0.3 MG/DL (ref 0.2–1.3)
BUN SERPL-MCNC: 12 MG/DL (ref 7–30)
CA-I BLD-MCNC: 4.2 MG/DL (ref 4.4–5.2)
CALCIUM SERPL-MCNC: 7.9 MG/DL (ref 8.5–10.1)
CHLORIDE BLD-SCNC: 104 MMOL/L (ref 94–109)
CO2 SERPL-SCNC: 30 MMOL/L (ref 20–32)
CREAT SERPL-MCNC: 0.7 MG/DL (ref 0.52–1.04)
ERYTHROCYTE [DISTWIDTH] IN BLOOD BY AUTOMATED COUNT: 14 % (ref 10–15)
GFR SERPL CREATININE-BSD FRML MDRD: >90 ML/MIN/1.73M2
GLUCOSE BLD-MCNC: 124 MG/DL (ref 70–99)
GLUCOSE BLDC GLUCOMTR-MCNC: 124 MG/DL (ref 70–99)
GLUCOSE BLDC GLUCOMTR-MCNC: 125 MG/DL (ref 70–99)
HCT VFR BLD AUTO: 31.9 % (ref 35–47)
HGB BLD-MCNC: 10.7 G/DL (ref 11.7–15.7)
MAGNESIUM SERPL-MCNC: 2.1 MG/DL (ref 1.6–2.3)
MCH RBC QN AUTO: 31.8 PG (ref 26.5–33)
MCHC RBC AUTO-ENTMCNC: 33.5 G/DL (ref 31.5–36.5)
MCV RBC AUTO: 95 FL (ref 78–100)
PHOSPHATE SERPL-MCNC: 3.4 MG/DL (ref 2.5–4.5)
PLATELET # BLD AUTO: 264 10E3/UL (ref 150–450)
POTASSIUM BLD-SCNC: 3.8 MMOL/L (ref 3.4–5.3)
PROT SERPL-MCNC: 5.5 G/DL (ref 6.8–8.8)
RBC # BLD AUTO: 3.36 10E6/UL (ref 3.8–5.2)
SODIUM SERPL-SCNC: 139 MMOL/L (ref 133–144)
WBC # BLD AUTO: 14.5 10E3/UL (ref 4–11)

## 2022-12-01 PROCEDURE — 250N000011 HC RX IP 250 OP 636

## 2022-12-01 PROCEDURE — 82330 ASSAY OF CALCIUM: CPT | Performed by: NURSE PRACTITIONER

## 2022-12-01 PROCEDURE — 250N000011 HC RX IP 250 OP 636: Performed by: NURSE PRACTITIONER

## 2022-12-01 PROCEDURE — 250N000013 HC RX MED GY IP 250 OP 250 PS 637

## 2022-12-01 PROCEDURE — 99207 PR NO BILLABLE SERVICE THIS VISIT: CPT | Performed by: INTERNAL MEDICINE

## 2022-12-01 PROCEDURE — 83735 ASSAY OF MAGNESIUM: CPT

## 2022-12-01 PROCEDURE — 258N000003 HC RX IP 258 OP 636: Performed by: NURSE PRACTITIONER

## 2022-12-01 PROCEDURE — 250N000013 HC RX MED GY IP 250 OP 250 PS 637: Performed by: NURSE PRACTITIONER

## 2022-12-01 PROCEDURE — 85027 COMPLETE CBC AUTOMATED: CPT

## 2022-12-01 PROCEDURE — 250N000009 HC RX 250: Performed by: ANESTHESIOLOGY

## 2022-12-01 PROCEDURE — 82248 BILIRUBIN DIRECT: CPT | Performed by: NURSE PRACTITIONER

## 2022-12-01 PROCEDURE — 258N000003 HC RX IP 258 OP 636: Performed by: ANESTHESIOLOGY

## 2022-12-01 PROCEDURE — 97530 THERAPEUTIC ACTIVITIES: CPT | Mod: GP

## 2022-12-01 PROCEDURE — 99233 SBSQ HOSP IP/OBS HIGH 50: CPT | Mod: 24 | Performed by: NURSE PRACTITIONER

## 2022-12-01 PROCEDURE — 97161 PT EVAL LOW COMPLEX 20 MIN: CPT | Mod: GP

## 2022-12-01 PROCEDURE — G0463 HOSPITAL OUTPT CLINIC VISIT: HCPCS

## 2022-12-01 PROCEDURE — 250N000013 HC RX MED GY IP 250 OP 250 PS 637: Performed by: ORTHOPAEDIC SURGERY

## 2022-12-01 PROCEDURE — 200N000002 HC R&B ICU UMMC

## 2022-12-01 PROCEDURE — 84100 ASSAY OF PHOSPHORUS: CPT

## 2022-12-01 PROCEDURE — 82947 ASSAY GLUCOSE BLOOD QUANT: CPT

## 2022-12-01 RX ORDER — HYDROMORPHONE HYDROCHLORIDE 1 MG/ML
INJECTION, SOLUTION INTRAMUSCULAR; INTRAVENOUS; SUBCUTANEOUS
Status: COMPLETED
Start: 2022-12-01 | End: 2022-12-01

## 2022-12-01 RX ADMIN — DULOXETINE 120 MG: 60 CAPSULE, DELAYED RELEASE ORAL at 21:08

## 2022-12-01 RX ADMIN — HYDROMORPHONE HYDROCHLORIDE 0.5 MG: 1 INJECTION, SOLUTION INTRAMUSCULAR; INTRAVENOUS; SUBCUTANEOUS at 00:34

## 2022-12-01 RX ADMIN — OXYCODONE HYDROCHLORIDE 10 MG: 10 TABLET ORAL at 02:26

## 2022-12-01 RX ADMIN — OXYCODONE HYDROCHLORIDE 5 MG: 5 TABLET ORAL at 13:42

## 2022-12-01 RX ADMIN — OXYCODONE HYDROCHLORIDE 10 MG: 10 TABLET ORAL at 22:59

## 2022-12-01 RX ADMIN — HYDROMORPHONE HYDROCHLORIDE 0.5 MG: 1 INJECTION, SOLUTION INTRAMUSCULAR; INTRAVENOUS; SUBCUTANEOUS at 11:26

## 2022-12-01 RX ADMIN — ACETAMINOPHEN 975 MG: 325 TABLET, FILM COATED ORAL at 04:10

## 2022-12-01 RX ADMIN — KETAMINE HYDROCHLORIDE 5 MG/HR: 100 INJECTION, SOLUTION, CONCENTRATE INTRAMUSCULAR; INTRAVENOUS at 18:29

## 2022-12-01 RX ADMIN — PREGABALIN 150 MG: 25 CAPSULE ORAL at 07:59

## 2022-12-01 RX ADMIN — OMEPRAZOLE 40 MG: 20 CAPSULE, DELAYED RELEASE ORAL at 21:08

## 2022-12-01 RX ADMIN — KETAMINE HYDROCHLORIDE 10 MG/HR: 100 INJECTION, SOLUTION, CONCENTRATE INTRAMUSCULAR; INTRAVENOUS at 06:07

## 2022-12-01 RX ADMIN — OXYCODONE HYDROCHLORIDE 10 MG: 10 TABLET ORAL at 18:33

## 2022-12-01 RX ADMIN — HYDROXYZINE HYDROCHLORIDE 25 MG: 25 TABLET, FILM COATED ORAL at 18:33

## 2022-12-01 RX ADMIN — ACETAMINOPHEN 975 MG: 325 TABLET, FILM COATED ORAL at 13:38

## 2022-12-01 RX ADMIN — BUSPIRONE HYDROCHLORIDE 30 MG: 10 TABLET ORAL at 07:59

## 2022-12-01 RX ADMIN — ARIPIPRAZOLE 5 MG: 5 TABLET ORAL at 07:59

## 2022-12-01 RX ADMIN — SENNOSIDES AND DOCUSATE SODIUM 1 TABLET: 50; 8.6 TABLET ORAL at 21:08

## 2022-12-01 RX ADMIN — METHOCARBAMOL 500 MG: 500 TABLET ORAL at 16:13

## 2022-12-01 RX ADMIN — CEFAZOLIN SODIUM 2 G: 2 INJECTION, SOLUTION INTRAVENOUS at 14:31

## 2022-12-01 RX ADMIN — METHOCARBAMOL 500 MG: 500 TABLET ORAL at 21:08

## 2022-12-01 RX ADMIN — Medication 500 MG: at 08:03

## 2022-12-01 RX ADMIN — SENNOSIDES AND DOCUSATE SODIUM 2 TABLET: 50; 8.6 TABLET ORAL at 07:59

## 2022-12-01 RX ADMIN — Medication 250 MG: at 08:03

## 2022-12-01 RX ADMIN — PREGABALIN 150 MG: 25 CAPSULE ORAL at 13:38

## 2022-12-01 RX ADMIN — HYDROXYCHLOROQUINE SULFATE 200 MG: 200 TABLET, FILM COATED ORAL at 21:08

## 2022-12-01 RX ADMIN — HYDROMORPHONE HYDROCHLORIDE 0.5 MG: 1 INJECTION, SOLUTION INTRAMUSCULAR; INTRAVENOUS; SUBCUTANEOUS at 16:13

## 2022-12-01 RX ADMIN — ROPINIROLE HYDROCHLORIDE 0.5 MG: 0.25 TABLET, FILM COATED ORAL at 21:08

## 2022-12-01 RX ADMIN — CARBOXYMETHYLCELLULOSE SODIUM 1 DROP: 5 SOLUTION/ DROPS OPHTHALMIC at 11:26

## 2022-12-01 RX ADMIN — CEFAZOLIN SODIUM 2 G: 2 INJECTION, SOLUTION INTRAVENOUS at 08:14

## 2022-12-01 RX ADMIN — Medication 1000 UNITS: at 07:59

## 2022-12-01 RX ADMIN — HYDROMORPHONE HYDROCHLORIDE 0.5 MG: 1 INJECTION, SOLUTION INTRAMUSCULAR; INTRAVENOUS; SUBCUTANEOUS at 04:10

## 2022-12-01 RX ADMIN — MONTELUKAST 10 MG: 10 TABLET, FILM COATED ORAL at 07:59

## 2022-12-01 RX ADMIN — BUSPIRONE HYDROCHLORIDE 30 MG: 10 TABLET ORAL at 21:08

## 2022-12-01 RX ADMIN — OXYCODONE HYDROCHLORIDE 10 MG: 10 TABLET ORAL at 07:59

## 2022-12-01 RX ADMIN — SODIUM CHLORIDE, POTASSIUM CHLORIDE, SODIUM LACTATE AND CALCIUM CHLORIDE: 600; 310; 30; 20 INJECTION, SOLUTION INTRAVENOUS at 10:31

## 2022-12-01 RX ADMIN — ACETAMINOPHEN 975 MG: 325 TABLET, FILM COATED ORAL at 21:07

## 2022-12-01 RX ADMIN — FAMOTIDINE 20 MG: 20 TABLET ORAL at 07:59

## 2022-12-01 RX ADMIN — HYDROXYCHLOROQUINE SULFATE 200 MG: 200 TABLET, FILM COATED ORAL at 07:59

## 2022-12-01 RX ADMIN — Medication 125 MCG: at 08:15

## 2022-12-01 RX ADMIN — FOLIC ACID 1 MG: 1 TABLET ORAL at 21:08

## 2022-12-01 ASSESSMENT — ACTIVITIES OF DAILY LIVING (ADL)
ADLS_ACUITY_SCORE: 30
ADLS_ACUITY_SCORE: 34
ADLS_ACUITY_SCORE: 30
ADLS_ACUITY_SCORE: 34
ADLS_ACUITY_SCORE: 30
ADLS_ACUITY_SCORE: 30
ADLS_ACUITY_SCORE: 34
ADLS_ACUITY_SCORE: 30

## 2022-12-01 NOTE — PROGRESS NOTES
Ortho Post-op Check    Subjective:  Seen in ICU.  Pain controlled.      Objective:   General:  Sleeping but arousable and participates in exam  Respiratory:  NLB on CPAP    Spine:  - Dressings c/d/i  - Drain patent with serosanguinous output  - DP pulses 2+ bilaterally, feet wwp bilaterally     Lumbar Spine:    Motor -     L2-3: Hip flexion R 5/5  And L 5/5 strength          L3/4:  Knee extension R 5/5 and L 5/5 strength         L4/5:  Foot dorsiflexion R 5/5 L 5/5 and       EHL dorsiflexion R 5/5 L 5/5 strength         S1:  Plantarflexion/Peroneal Muscles  R 5/5 and L 5/5 strength    Sensation: intact to light touch L3-S1 distribution BLE        Assessment/Plan:   52 year old female POD 0 s/p PISF T8 - pelvis; TLIF L1 S1.  Doing well.    - Continue plan per brief op note.    --  Jacobo Guillory MD  Orthopedic Surgery PGY-2

## 2022-12-01 NOTE — PROGRESS NOTES
Orthopaedic Surgery Progress Note 12/01/2022    S: No acute events overnight. Admitted to ICU. Pain controlled with current regimen. Denies extremity numbness or tingling. Denies chest pain, SOB, nausea/vomiting. Tolerating regular diet. -BM +flatus. Voiding via bond.  Has not been OOB.     O:  Temp: 97.9  F (36.6  C) Temp src: Oral BP: 116/81 Pulse: 107   Resp: 14 SpO2: 97 % O2 Device: BiPAP/CPAP Oxygen Delivery: 6 LPM    Exam:  Gen: No acute distress, resting comfortably in bed.  Resp: Non-labored breathing  CV: RRR  MSK: Back dressings CDI    Lower extremities:  Motor Strength Right Left   Hip flexion: L1, L2, L3 5/5 5/5   Hip adduction: L2, L3 5/5 5/5   Knee flexion: S1 5/5 5/5   Knee extension: L3, L4 5/5 5/5   Ankle dosiflexion: L4, L5 5/5 5/5   EHL: L5 5/5 5/5   Ankle plantarflexion: S1 5/5 5/5     Sensation from L1-S2 is preserved.    Output by Drain (mL) 11/29/22 0700 - 11/29/22 1459 11/29/22 1500 - 11/29/22 2259 11/29/22 2300 - 11/30/22 0659 11/30/22 0700 - 11/30/22 1459 11/30/22 1500 - 11/30/22 2259 11/30/22 2300 - 12/01/22 0659 12/01/22 0700 - 12/01/22 1206   Closed/Suction Drain Midline Back Accordion 10 Vietnamese     150 140 30   Closed/Suction Drain Inferior;Midline Back Accordion 10 Vietnamese     100 80 15     Assessment and Plan: Zayra Ramirez is a 52 year old female now s/p PISF T8 - pelvis; TLIF L1 S1 on 11/30 with Dr. Wilhelm.      12/1/2022  ICU cares  PT/OT, OOB as able  Pain control  Discontinue bond as able  Continue drains     Choco Primary  Activity: Up with assist until independent. No excessive bending or twisting. No lifting >10 lbs x 6 weeks. Encourage ambulation. If James lift needed, use high back sling.   Weight bearing status: WBAT.  Pain management: Transition from IV to PO as tolerated.    Antibiotics: Ancef x 24 hours.  Diet: Begin with clear fluids and progress diet as tolerated. Protein supplements TID.  DVT prophylaxis: SCDs only. No chemical DVT ppx needed at  discharge.  Imaging: XR Upright scoliosis PTDC - ordered.  Labs: Hgb POD#1 + 2.  Bracing/Splinting: Birdseye brace at all times   Dressings: Keep Aquacel c/d/i x 7 days.  Drains: Document output per shift, will be discontinued at Orthopedic Surgery discretion.  Hicks catheter: Remove POD#1.   Physical Therapy/Occupational Therapy: Eval and treat.  Cultures: Pending, follow culture results closely.    Consults: Medicine.  Follow-up: Clinic with Dr. Wilhelm in 6 weeks with standing scoliosis x-rays needed.   Disposition: Pending progress with therapies, pain control on orals, and medical stability, anticipate discharge on POD #2-3.    Julian Garrison MD PGY4  Orthopaedic Surgery     FOLLOWUP:    Future Appointments   Date Time Provider Department Center   12/1/2022  8:15 AM Licha Pollard, PT [facility] [place]   12/1/2022  1:45 PM Licha Pollard, PT [facility] [place]   12/2/2022  8:15 AM Crys Hong, OT [facility] [place]   1/10/2023 11:45 AM Dior Arroyo PA-C [facility]   1/12/2023 10:40 AM Philip Wilhelm MD [facility]   1/25/2023  1:00 PM [facility]   2/3/2023  1:20 PM Dorothy Hong MD [facility]   2/14/2023  9:55 AM Morro Mijares MD [facility]   3/2/2023  1:00 PM Panchito Saenz MD [facility]   4/25/2023  1:00 PM Danni Dai PA-C [facility]

## 2022-12-01 NOTE — CONSULTS
WOC Consult    S: WOC Consult to evaluate and treat blisters on chest.     B: Per Dr Billy Schmid NP on 11/30/2022: Zayra Ramirez is a 52 year old female with PMH HTN, ROBERT w/ CPAP w/ supplemental O2, ILD, former tobacco use with cessation in 11/2021, anxiety, depression, borderline personality disorder, PTSD, restless leg syndrome, neuropathy, morbid obesity, GERD, TMJ syndrome, rheumatoid arthritis, chronic immunosuppression, chronic right shoulder pain, pseudogout, and previous other spinal surgeries who was admitted on 11/30/2022 for a posterior instrumented spinal fusion of their T8 to S1 with bilateral pelvic fixation and transforaminal lumbar fusion from L1 to S1.     The patient has had previous spinal surgeries since 2017, including: an anterior cervical discectomy and fusion (ACDF) in 10/2017, spinal cord stimulator implantation in 08/2019, that was complicated by an MSSA epidural abscess, and eventually removed, and T11-L2 laminectomies in 08/2019.     A: Anterior skin on chest and abdomen assessed. All skin is intact. Pink, blanchable areas noted over breasts, no open areas or blisters noted.     P: No wound care indicated. WOC will sign off. Please reconsult with further questions or concerns.     Surekha Townsend RN, CWOCN  Pager [phone]

## 2022-12-01 NOTE — PROGRESS NOTES
12/01/22 1113   Appointment Info   Signing Clinician's Name / Credentials (PT) Licha Pollard DPT       Present no   Language English   Living Environment   People in Home significant other   Current Living Arrangements house  (rambler)   Home Accessibility stairs to enter home;stairs within home   Number of Stairs, Main Entrance 2   Stair Railings, Main Entrance none   Number of Stairs, Within Home, Primary one   Stair Railings, Within Home, Primary   (wall)   Transportation Anticipated family or friend will provide   Self-Care   Usual Activity Tolerance good   Current Activity Tolerance fair   Regular Exercise No   Equipment Currently Used at Home none  (has access to 4WW)   Fall history within last six months no   General Information   Onset of Illness/Injury or Date of Surgery 11/30/22   Referring Physician Billy Schmid APRN CNP   Patient/Family Therapy Goals Statement (PT) Decrease pain   Pertinent History of Current Problem (include personal factors and/or comorbidities that impact the POC) 52 year old female POD 0 s/p PISF T8 - pelvis; TLIF L1 S1   Existing Precautions/Restrictions Fall;spinal. Jewitt brace on when OOB   Weight-Bearing Status - LUE full weight-bearing   Weight-Bearing Status - RUE full weight-bearing   Weight-Bearing Status - LLE weight-bearing as tolerated   Weight-Bearing Status - RLE weight-bearing as tolerated   General Observations Supine in bed upon arrival, agreeable to PT. Multiple lines including ART present   Cognition   Affect/Mental Status (Cognition) WNL   Orientation Status (Cognition) oriented x 3   Follows Commands (Cognition) WNL   Pain Assessment   Patient Currently in Pain Yes, see Vital Sign flowsheet   Integumentary/Edema   Integumentary/Edema no deficits were identifed   Posture    Posture Forward head position;Protracted shoulders;Kyphosis   Posture Comments Mild to moderate sitting EOB and standing   Range of Motion (ROM)   ROM Comment Did not  formally assess, demonstrates functional ROM with mobility   Strength (Manual Muscle Testing)   Strength Comments Did not formally assess, demonstrates functional strength with mobility   Bed Mobility   Comment, (Bed Mobility) Completes supine to sit transfer with modA using log roll technique, 2nd person managing lines   Transfers   Comment, (Transfers) Completes sit<>stand transfer with Marisela using walker   Gait/Stairs (Locomotion)   Comment, (Gait/Stairs) Takes steps from bed to recliner with Marisela using walker   Balance   Balance Comments SBA for sitting balance, Marisela to CGA for standing balance with UE support from walker   Sensory Examination   Sensory Perception WNL   Clinical Impression   Criteria for Skilled Therapeutic Intervention Yes, treatment indicated   PT Diagnosis (PT) impaired functional mobility   Influenced by the following impairments increased post-op pain, precautions, decreased activity tolerance, decreased LE strength   Functional limitations due to impairments impaired bed mobility, transfers and ambulation   Clinical Presentation (PT Evaluation Complexity) Stable/Uncomplicated   Clinical Presentation Rationale Per clinical judgment   Clinical Decision Making (Complexity) low complexity   Planned Therapy Interventions (PT) balance training;bed mobility training;gait training;home exercise program;stair training;strengthening;transfer training;progressive activity/exercise;risk factor education;home program guidelines   Anticipated Equipment Needs at Discharge (PT)   (has access to walker)   Risk & Benefits of therapy have been explained evaluation/treatment results reviewed;care plan/treatment goals reviewed;risks/benefits reviewed;current/potential barriers reviewed   PT Total Evaluation Time   PT Eval, Low Complexity Minutes (18036) 8   Physical Therapy Goals   PT Frequency 2x/day   PT Predicted Duration/Target Date for Goal Attainment 12/08/22   PT Goals Bed  Mobility;Transfers;Gait;Stairs;PT Goal 1   PT: Bed Mobility Supervision/stand-by assist;Supine to/from sit;Within precautions   PT: Transfers Supervision/stand-by assist;Sit to/from stand;Bed to/from chair;Assistive device   PT: Gait Supervision/stand-by assist;Assistive device;150 feet   PT: Stairs Minimal assist;Assistive device;2 stairs  (no rail, 1 stair with wall support)   PT: Goal 1 Independent with HEP of LE strengthening   Therapeutic Activity   Therapeutic Activities: dynamic activities to improve functional performance Minutes (26794) 25   Symptoms Noted During/After Treatment Fatigue;Increased pain   Treatment Detail/Skilled Intervention PT: Supine in bed upon arrival, agreeable to PT. Increased time needed for room set-up and line management. Educated patient on spine precautions following surgery, verbalized understanding but requires guidance throughout. With increased time completes supine to sit transfer with use of log roll technique, side rail and light modA. Sat EOB with fair tolerance some increase in pain noted. Marisela to scoot hips forward EOB to get feet placed on floor with some pain improvement. Agreeable to try to stand. Completes sit<>stand transfer with Marisela using walker. Stood for about a minute at walker, VSS. Patient then able to take a few steps from bed to recliner with Marisela using walker, cues needed to keep eyes open. Patient able to get sitting safety in recliner. Vitals assessed and remained stable. Increased time needed for comfortable position but able to remain up in recliner with needs in reach. Nurse present for most of session.   PT Discharge Planning   PT Plan PT: Continue bed mobility, transfers, standing tolerance. Gait as able   PT Discharge Recommendation (DC Rec) home with assist;home with home care physical therapy   PT Rationale for DC Rec PT: Pending LOS anticipate patient will likely be able to return home with assist and home PT for safety evaluation and  progression. Patient's SO does work during the day. Has access to 4WW for d/c   PT Brief overview of current status PT: ModA for bed mobility, Marisela for sit<>stand transfers and taking steps from bed to recliner with walker   Total Session Time   Timed Code Treatment Minutes 25   Total Session Time (sum of timed and untimed services) 33

## 2022-12-01 NOTE — ANESTHESIA POSTPROCEDURE EVALUATION
Patient: Zayra Ramirez    Procedure: Procedure(s):  Posterior Instrumented Spinal Fusion Thoracic 8 to Sacrum with Bilateral Pelvic Fixation; Transforaminal Lumbar Interbody Fusion with Erickson-Nixon Osteotomy Lumbar 1 to Sacral 1 (5 levels); Possible Cement Augmentation of Screws; Use of Infuse Bone Morphogenetic Protein Large Kit and Allograft       Anesthesia Type:  General    Note:  Disposition: Inpatient   Postop Pain Control: Uneventful            Sign Out: Well controlled pain   PONV: No   Neuro/Psych: Uneventful            Sign Out: Acceptable/Baseline neuro status   Airway/Respiratory: Uneventful            Sign Out: Acceptable/Baseline resp. status   CV/Hemodynamics: Uneventful            Sign Out: Acceptable CV status; No obvious hypovolemia; No obvious fluid overload   Other NRE: NONE   DID A NON-ROUTINE EVENT OCCUR? No           Last vitals:  Vitals Value Taken Time   BP 88/50 11/30/22 1847   Temp 36.5  C (97.7  F) 11/30/22 1818   Pulse 123 11/30/22 1932   Resp 16 11/30/22 1932   SpO2 100 % 11/30/22 1932   Vitals shown include unvalidated device data.    Electronically Signed By: Emely Quintero MD  November 30, 2022  7:38 PM

## 2022-12-01 NOTE — PROGRESS NOTES
S: Patient seen today at  for an eval for a Marge Hyperextension brace as ordered.    O/G: reduce spinal flexion    A: The patient was fit with a thoracolumbosacral anterior hyperextension orthosis Stonewall size large.  Multiple adjustments made to customize the fit of the brace on the patient. The pt was instructed on donning/doffing and proper care of the orthisis was explained.  The fitting of the orthosis was fit per manufactures recommendations.  Upon completion of initial fitting, pt had no complaints and the fitting appears to be satisfactory at this time.    P: I have instructed pt/staff to contact our facility with any questions and/or concerns.   JOAN nAn.

## 2022-12-01 NOTE — ANESTHESIA CARE TRANSFER NOTE
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    Anesthesia Type:   General     Note:    Oropharynx: nasal airway in place and spontaneously breathing  Level of Consciousness: awake and drowsy  Oxygen Supplementation: face mask  Level of Supplemental Oxygen (L/min / FiO2): 8  Independent Airway: airway patency satisfactory and stable  Dentition: dentition unchanged  Vital Signs Stable: post-procedure vital signs reviewed and stable    Patient transferred to: PACU    Handoff Report: Identifed the Patient, Identified the Reponsible Provider, Reviewed the pertinent medical history, Discussed the surgical course, Reviewed Intra-OP anesthesia mangement and issues during anesthesia, Set expectations for post-procedure period and Allowed opportunity for questions and acknowledgement of understanding      Vitals:  Vitals Value Taken Time   /29 11/30/22 1824   Temp 36.5 C    Pulse 141 11/30/22 1826   Resp 17 11/30/22 1826   SpO2 96 % 11/30/22 1826   Vitals shown include unvalidated device data.    Electronically Signed By: NICKO Hatfield CRNA  November 30, 2022  6:27 PM

## 2022-12-01 NOTE — PLAN OF CARE
Problem: Pain Acute  Goal: Optimal Pain Control and Function  12/1/2022 1741 by Patito Holguin RN  Outcome: Progressing     ICU End of Shift Summary. See flowsheets for vital signs and detailed assessment.     Changes this shift:  Neuro: RASS 0 to -1, did not sleep much last night.  Oriented x4.  Mild numbness in fingers this morning, resolved this afternoon.  Cardiac:      ST.  BP WNL.   Respiratory:  Weaned from 8 to 3L NC, 3L bleed in O2 to CPAP while sleeping, which is home setting.  LS clear.    GI/:  No BM this shift, bowel meds given.  Hicks removed this afternoon at 1500, has not voided yet.  Diet/appetite:  Regular diet, minimal appetite, mostly drinking apple juice, ordered dinner.  Activity:  Up to chair with walker and 2 assist.  Up in chair for 2 hours.  Q2 turns while in bed.  Pain:  Pain 4-7/10.  Scheduled tylenol given, PRN oxycodone, dilaudid, robaxin given with relief.  Ice packs applied.  Ketamine has been running at 10 most of the day, currently off, pt tolerating.  Skin:  Dressings c/d/I.  No acute changes.  LDA's:  Hemovac x2 on upper back with moderate bright red output.  NC.  Hicks removed.  Arterial line removed.  PIV x2.      Step cynthia Luna updated over the phone, ok per pt.     Plan:  Downgraded to med surg status this afternoon, transfer out when bed available.  PT/OT.  Manage pain.  Monitor spine incisions and drains.  Continue plan of care.

## 2022-12-01 NOTE — PROGRESS NOTES
CLINICAL NUTRITION SERVICES - ASSESSMENT NOTE     Nutrition Prescription    RECOMMENDATIONS FOR MDs/PROVIDERS TO ORDER:  None    Malnutrition Status:    Patient does not meet two of the established criteria necessary for diagnosing malnutrition    Recommendations already ordered by Registered Dietitian (RD):  Nutrition education - increased protein post-op  PRN snacks/supplements    Future/Additional Recommendations:  Monitor labs, intakes, and weight trends.     REASON FOR ASSESSMENT  Zayra Ramirez is a/an 52 year old female assessed by the dietitian for Provider Order - Patient needs high-protein education and ordering of preferred supplements at 1.5 g protein per kg body weight.    CURRENT NUTRITION ORDERS  Diet: Regular    MEDICAL/NUTRITION HISTORY:   Hx of HTN, ROBERT w/ CPAP w/ supplemental O2, ILD, former tobacco use with cessation in 11/2021, anxiety, depression, borderline personality disorder, PTSD, restless leg syndrome, neuropathy, morbid obesity, GERD, TMJ syndrome, rheumatoid arthritis, chronic immunosuppression, chronic right shoulder pain, pseudogout, and previous other spinal surgeries who was admitted on 11/30/2022 for a posterior instrumented spinal fusion of their T8 to S1 with bilateral pelvic fixation and transforaminal lumbar fusion from L1 to S1.     The patient has had previous spinal surgeries since 2017, including: an anterior cervical discectomy and fusion (ACDF) in 10/2017, spinal cord stimulator implantation in 08/2019, that was complicated by an MSSA epidural abscess, and eventually removed, and T11-L2 laminectomies in 08/2019.     Patient follows RD in [place] weight-loss clinic. Is currently on aaron for weight loss. Per patient, is also eating more vegetables and high protein foods. Provided with list of snacks and supplements and encouraged increased protein intake post-op. Patient with no questions/concerns at this time.     LABS  Labs reviewed: Glucose ranged 105-142 on 11/30,  "ALT 57 (H),  (H)    MEDICATIONS  Medications reviewed: calcium gluconate/carbonate, magnesium sulfate/gluconate, vitamin D3, pepcid, folic acid, omeprazole, senna, vitamin E, LR    ANTHROPOMETRICS  Height: 162.6 cm (5' 4.016\")  Most Recent Weight: 88.8 kg (195 lb 12.3 oz)  IBW: 54.5 kg (163% IBW)  BMI: Obesity Grade I BMI 30-34.9  Weight History: Pt with limited weight history prior to admission 32 lb (14%) weight loss in  6 months. - intentional weight loss  Wt Readings from Last Encounters:   12/01/22 88.8 kg (195 lb 12.3 oz)   11/08/22 89.4 kg (197 lb 3.2 oz)   09/07/22 93.9 kg (207 lb)   08/16/22 94.3 kg (208 lb)   07/29/22 97.9 kg (215 lb 12.8 oz)   07/28/22 98.1 kg (216 lb 3.2 oz)   07/15/22 101.4 kg (223 lb 9.6 oz)   06/30/22 99.9 kg (220 lb 3.2 oz)   06/03/22 102.7 kg (226 lb 6.4 oz)   06/02/22 103.5 kg (228 lb 1.6 oz)   02/18/22 105.7 kg (233 lb 1.6 oz)   01/18/22 103 kg (227 lb)   12/06/21 101.2 kg (223 lb)   11/29/21 100.2 kg (221 lb)     Dosing Weight: 63 kg - ABW using current weight and IBW of 54.5 kg.     ASSESSED NUTRITION NEEDS  Estimated Energy Needs: 7185-3894 kcals/day (25 - 30 kcals/kg)  Justification: Maintenance  Estimated Protein Needs: 76-95 grams protein/day (1.2 - 1.5 grams of pro/kg)  Justification: Increased needs  Estimated Fluid Needs }1 mL/kcal or per provider pending fluid status    MALNUTRITION  % Intake: Decreased intake does not meet criteria  % Weight Loss: Weight loss does not meet criteria - intetntional  Subcutaneous Fat Loss: None observed  Muscle Loss: None observed  Fluid Accumulation/Edema: None noted  Malnutrition Diagnosis: Patient does not meet two of the established criteria necessary for diagnosing malnutrition    NUTRITION DIAGNOSIS  Predicted inadequate protein-energy intake related to variable appetite as evidenced by pt reliant on PO intakes to meet 100% of nutritional needs with potential for variation    INTERVENTIONS  Implementation  Nutrition " Interventions: other (see comments)    Goals  Patient Goals:Nutrition Focus  Nutrition Goals:: PO  PO Goal:: PO >75%     Monitoring/Evaluation  Progress toward goals will be monitored and evaluated per protocol.    Lucie Meyer MS, RDN, LDN  RD pager: [phone]  WB Weekend/Holiday Pager: [phone]

## 2022-12-01 NOTE — PROGRESS NOTES
Neuro: A&Ox4. Significant soreness and pain in back, controlling w/ Ketamine gtt at 10mg/hr and PRN Dilaudid and oxycodone.  Cardiac: normotensive, tachycardic. Pulses present in all extremities.  Resp: lung sounds clear, on home CPAP machine overnight  GI: no bowel movement overnight, bowel sounds present  : bond catheter in place, draining well.  Skin: blanchable redness on scattered areas on front of body likely due to prone positioning for spinal surgery. Small blisters noted on chest.    Shift notes: pt slept poorly overnight due to back pain. Art line highly positional.    Plan: remove art line and bond      For vital signs and complete assessments, please see documentation flowsheets.

## 2022-12-01 NOTE — PROGRESS NOTES
Patient underwent elective PISF T8 - pelvis; TLIF L1 S1 yesterday.      She was subsequently admitted to ICU for overnight monitoring.      Currently on ketamine drip for postop pain management    Per ICU team, no ICU care is needed    Transfer to medical conte for postop care    Ortho is primary    Hospital service is consult      Elsy Triana MD.   Hospitalist.  [phone], pager.

## 2022-12-01 NOTE — PHARMACY-ADMISSION MEDICATION HISTORY
Medication history was completed pre-operatively on 11/7/22. Please see pharmacy note for details. Hydroxychloroquine was prescribed and filled on 11/18/22, which was added to the patient home medication list.

## 2022-12-01 NOTE — CONSULTS
"Pain Service Consultation Note  [facility]      Patient Name: Zayra Ramirez  MRN: [id number]   Age: 52 year old  Sex: female  Date: December 1, 2022                                      Reviewed: Yes    Referring Provider:  Bucky Garrett MD  Referring Service:  ortho  Reason for Consultation: \"Evaluation and Treatment - Post Op Thoracic/Lumbar Surgery\"    Assessment/Recommendations:  Zayra Ramirez is a 52 year old female who has PMH of \"HTN, ROBERT w/ CPAP w/ supplemental O2, ILD, former tobacco use with cessation in 11/2021, anxiety, depression, borderline personality disorder, PTSD, restless leg syndrome, neuropathy, morbid obesity, GERD, TMJ syndrome, rheumatoid arthritis, chronic immunosuppression, chronic right shoulder pain, pseudogout, and previous other spinal surgeries who was admitted on 11/30/2022 for a posterior instrumented spinal fusion of their T8 to S1 with bilateral pelvic fixation and transforaminal lumbar fusion from L1 to S1.\"    Pain service consulted to assist with pain management.    PTA, Zayra was opioid naive.    Zayra states pain is in the lower back which is a dull ache, burning type pain.  Denies any pain in the LE. States pain is 5/10 which is tolerable.  PTA, she states pain was \"pretty up there\" at 9/10 in the lower back and buttock.  She reports soreness due to being in bed.  She feels her pain is being managed.     We discussed what to expect in the next few days for pain management as she physically heals.  Will continue with pain regimen as is for now but will eventually taper down IV pain medications.  She is agreeable.    Plan:   1. Continue ketamine infusion at 10mg/hour  2. Continue oxycodone 5-10mg PO Q 4 hours PRN.   If needed, can change to Q 3 hours PRN.  3. IV Dilaudid 0.3-0.5mg IV Q 2 hours PRN if oral opioid not adequately controlling pain.  4. Continue PTA Lyrica, cymbalta.  5. Continue acetaminophen.  6. Bowel regimen.    Thank you " "for the opportunity to participate in the care of Zayra Ramirez  Pain Service will continue to follow.    Discussed with attending anesthesiologist  Primary Service Contacted with Recommendations? Yes    Please Page the Pain Team Via Amcom: \"PAIN MANAGEMENT ACUTE INPATIENT/ [facility]\"    Rayna Hernandez PA-C  12/1/2022      Chief Complaint:  Lower back      History of Present Illness:  Zayra Ramirez is a 52 year old old female previous other spinal surgeries who was admitted on 11/30/2022 for a posterior instrumented spinal fusion of their T8 to S1 with bilateral pelvic fixation and transforaminal lumbar fusion from L1 to S1.   She was opioid naive PTA.    Today is POD#1 and is currently in ICU for post operative monitoring.  Zayra states pain is in the lower back which is a dull ache, burning type pain.  Denies any pain in the LE. States pain is 5/10 which is tolerable.  PTA, she states pain was \"pretty up there\" at 9/10 in the lower back and buttock.  She reports soreness due to being in bed.  She feels her pain is being managed.                   Per [place]  review pulled from system on 12/01/22.     11/18/2022  1   10/19/2022  Pregabalin 150 MG Capsule  60.00  30 [facility]   [id number]   Gra ([id number])   1/3  2.[place]   10/19/2022  1   10/19/2022  Pregabalin 150 MG Capsule  60.00  30 [facility]   [id number]   Gra ([id number])   0/3  2.[place]   08/30/2022  1   08/30/2022  Diazepam 5 MG Tablet  2.00  1 [facility]   [id number]   Gra ([id number])   0/0  1.00 [place]   08/21/2022  1   04/04/2022  Pregabalin 150 MG Capsule  60.00  30 [facility]                Past Medical History:  Past Medical History:   Diagnosis Date     Allergic rhinitis      Anemia      Arthritis      Asthma     copd     Dental abscess 08/2015     Depressive disorder      Gastroesophageal reflux disease      History of emphysema      Hoarseness      Hypertension      Obstructive sleep apnea      Other chronic pain      Respiratory " bronchiolitis interstitial lung disease (H)      Sleep apnea      Smoker 2015         Family History:    Family History   Problem Relation Age of Onset     Asthma Mother      Restless Leg Syndrome Mother      Other Cancer Father         base of tongue cancer at age ~65     Hypertension Father      Back Pain Father      Restless Leg Syndrome Father      Coronary Artery Disease Father      Restless Leg Syndrome Sister      Breast Cancer Maternal Aunt 55     Anesthesia Reaction No family hx of      Deep Vein Thrombosis (DVT) No family hx of      Thyroid Cancer No family hx of      Multiple endocrine neoplasia No family hx of        Social History:  Social History     Tobacco Use     Smoking status: Former     Packs/day: 1.00     Years: 30.00     Pack years: 30.00     Types: Cigarettes     Start date: 1996     Quit date: 2021     Years since quittin.0     Smokeless tobacco: Never   Substance Use Topics     Alcohol use: No         Review of Systems:  Complete ROS reviewed. Unless otherwise noted, all other systems found to be negative.        Laboratory Results:  Recent Labs   Lab Test 22  0426 19  0616 19  0416 19  2126   INR  --   --   --  1.09      < >  --  363   PTT  --   --  37  --    BUN 12   < >  --  10    < > = values in this interval not displayed.         Allergies:  Allergies   Allergen Reactions     Bee Venom Anaphylaxis     Doxycycline Anaphylaxis     Patient thinks it may have been just nausea and vomiting, however unable to confirm  Other reaction(s): throat closes     Erythromycin      Other reaction(s): Vomiting       Hydrocodone-Acetaminophen Itching         Pain Medications:  Pain Medications/Prescriber: no chronic opioids    Current Pain Related Medications:  Medications related to Pain Management (From now, onward)    Start     Dose/Rate Route Frequency Ordered Stop    22 0000  acetaminophen (TYLENOL) tablet 650 mg         650 mg Oral EVERY 4  HOURS PRN 11/30/22 1947 12/01/22 0800  pregabalin (LYRICA) capsule 150 mg        Note to Pharmacy: PTA Sig:Take 1 capsule (150 mg) by mouth 2 times daily  Patient taking differently: Take 150 mg by mouth 2 times daily AM and around 1 PM      150 mg Oral 2 TIMES DAILY 11/30/22 1945 12/01/22 0800  polyethylene glycol (MIRALAX) Packet 17 g         17 g Oral DAILY 11/30/22 1759      11/30/22 2355  methocarbamol (ROBAXIN) tablet 500 mg         500 mg Oral EVERY 6 HOURS PRN 11/30/22 2355 11/30/22 2335  HYDROmorphone (PF) (DILAUDID) injection 0.3-0.5 mg         0.3-0.5 mg Intravenous EVERY 1 HOUR PRN 11/30/22 2336 11/30/22 2000  busPIRone (BUSPAR) tablet 30 mg        Note to Pharmacy: PTA Sig:Take 30 mg by mouth 2 times daily       30 mg Oral 2 TIMES DAILY 11/30/22 1945 11/30/22 2000  DULoxetine (CYMBALTA) DR capsule 120 mg        Note to Pharmacy: PTA Sig:Take 120 mg by mouth At Bedtime       120 mg Oral AT BEDTIME 11/30/22 1945 11/30/22 2000  senna-docusate (SENOKOT-S/PERICOLACE) 8.6-50 MG per tablet 1 tablet        See Hyperspace for full Linked Orders Report.    1 tablet Oral 2 TIMES DAILY 11/30/22 1945 11/30/22 2000  senna-docusate (SENOKOT-S/PERICOLACE) 8.6-50 MG per tablet 2 tablet        See Hyperspace for full Linked Orders Report.    2 tablet Oral 2 TIMES DAILY 11/30/22 1945 11/30/22 2000  [Held by provider]  cyclobenzaprine (FLEXERIL) tablet 10 mg        (Held by provider since Wed 11/30/2022 at 2355 by Amanda Soni NP.Hold Reason: Other)   Note to Pharmacy: PTA Sig:Take 1 tablet (10 mg) by mouth At Bedtime      10 mg Oral AT BEDTIME 11/30/22 1637      11/30/22 2000  acetaminophen (TYLENOL) tablet 975 mg         975 mg Oral EVERY 8 HOURS 11/30/22 1947 12/03/22 1959 11/30/22 1947  hydrOXYzine (ATARAX) tablet 25 mg         25 mg Oral EVERY 6 HOURS PRN 11/30/22 1947 11/30/22 1947  oxyCODONE (ROXICODONE) tablet 5 mg        See Hyperspace for full Linked Orders  "Report.    5 mg Oral EVERY 4 HOURS PRN 11/30/22 1947 11/30/22 1947  oxyCODONE IR (ROXICODONE) tablet 10 mg        See Hyperspace for full Linked Orders Report.    10 mg Oral EVERY 4 HOURS PRN 11/30/22 1947 11/30/22 1947  lidocaine (LMX4) cream          Topical EVERY 1 HOUR PRN 11/30/22 1947 11/30/22 1947  lidocaine 1 % 0.1-1 mL         0.1-1 mL Other EVERY 1 HOUR PRN 11/30/22 1947 12/03/22 1946 11/30/22 1945  diclofenac (VOLTAREN) 1 % topical gel 4 g         4 g Topical 4 TIMES DAILY PRN 11/30/22 1945 11/30/22 1830  ketamine bolus from infusion pump 10.94 mg         0.2 mg/kg × 54.7 kg (Ideal) Intravenous ONCE 11/30/22 1828      11/30/22 1753  magnesium hydroxide (MILK OF MAGNESIA) suspension 30 mL         30 mL Oral DAILY PRN 11/30/22 1759 11/30/22 1753  bisacodyl (DULCOLAX) suppository 10 mg         10 mg Rectal DAILY PRN 11/30/22 1759 11/30/22 1700  ketamine (KETALAR) 2 mg/mL in sodium chloride 0.9 % 50 mL ANALGESIA infusion         5-10 mg/hr  2.5-5 mL/hr  Intravenous CONTINUOUS 11/30/22 1630              Physical Exam:  Vitals: /81   Pulse 107   Temp 97.9  F (36.6  C) (Oral)   Resp 14   Ht 1.626 m (5' 4.02\")   Wt 88.8 kg (195 lb 12.3 oz)   LMP  (LMP Unknown)   SpO2 97%   BMI 33.59 kg/m      Physical Exam:     CONSTITUTIONAL/GENERAL APPEARANCE:  NAD.  EYES: EOMI, sclera anicteric, PERRLA  ENT/NECK: atraumatic, lips and oral mucous membranes dry  RESPIRATORY: on cpap  CV:tachycardic  ABDOMEN: Soft, non-tender, non-distended  MUSCULOSKELETAL/BACK/SPINE/EXTREMITIES: Moves all extremities purposefully.  No edema or obvious joint deformities   NEURO: Alert and Oriented x3. Answers questions appropriately  SKIN/VASCULAR EXAM:  No jaundice, no visible rashes or lesions            "

## 2022-12-01 NOTE — PROGRESS NOTES
[facility]    ICU Progress Note       Date of Admission:  11/30/2022    Assessment: Critical Care   Zayra Ramirez is a 52 year old female with PMH HTN, ROBERT w/ CPAP w/ supplemental O2, ILD, former tobacco use with cessation in 11/2021, anxiety, depression, borderline personality disorder, PTSD, restless leg syndrome, neuropathy, morbid obesity, GERD, TMJ syndrome, rheumatoid arthritis, chronic immunosuppression, chronic right shoulder pain, pseudogout, and previous other spinal surgeries who was admitted on 11/30/2022 for a posterior instrumented spinal fusion of their T8 to S1 with bilateral pelvic fixation and transforaminal lumbar fusion from L1 to S1.     The patient has had previous spinal surgeries since 2017, including: an anterior cervical discectomy and fusion (ACDF) in 10/2017, spinal cord stimulator implantation in 08/2019, that was complicated by an MSSA epidural abscess, and eventually removed, and T11-L2 laminectomies in 08/2019.     Major Changes Today:  - Ion Calcium recheck  - discontinue H2B  - discontinue arterial line  - discontinue Hicks  - Transfer to Medicine Service        Plan: Critical Care   Neuro:  # Pain and sedation  # Hx restless leg syndrome  # Hx Anxiety and Depression  # Hx borderline personality disorder  # Hx PTSD  - APAP 975mg PO q8 hrs   - PTA cyclobenziprine 10mg PO at bedtime  - (new) fentanyl 25-50mcg IV q1 hrs PRN  - (new) oxycodone 5-10mg PO q4 hrs PRN   - PTA pregabalin 150mg PO BID   - PTA ropinirole 0.5mg PO at bedtime  - (new) voltaren topical gel 4 times PRN daily  - PTA duloxetine 120mg PO at bedtime  - PTA aripiprazole 5mg PO qDay  - PTA buspirone 30mg PO BID  - (new) post-op ketamine infusion 5-10 mg/hr     - RASS goal 0     Pulmonary:  # ROBERT with home CPAP  # ILD  - PTA albuterol inhaler 2 puffs q6 hrs PRN  - PTA albuterol neb 3ml q5 hrs PRN  - Continue home CPAP with patient's home settings  - Maintain O2 saturation  greater than 92%     Cardiovascular:  # Hx HTN  - HOLDING PTA lisinopril/hydrochlorothiazide 20-25mg PO qDay  - Maintain SBP greater than 90 mmHg  - Maintain MAP goal greater than 65 mmhg     GI/Nutrition:  # Hx GERD  # Hx class III obesity  - PTA omeprazole 40mg PO qDay  - Regular diet for Nutrition     # Constipation  - (new) Senna 1-2 tabs PO BID  - (new) Miralax PO qDay  - (new) dulcolax 10mg UT qDay PRN        Renal/Fluids/Electrolytes:  # No acute issues  - electrolyte replacement protocols replacement protocols  - serial BMP, Mg, Phos   - DC LR IV infusion  - Continue home vitamins     Endocrine:  # Diabetes  Lab Results   Component Value Date    A1C 5.3 07/07/2022    A1C 5.8 04/08/2021   - Goal BG < 180 for optimal healing   - Hold PTA liraglutide      ID:  # Hx rheumatoid arthritis  - Continue PTA hydroxychloroquine 200mg PO BID  - HOLDING PTA methotrexate 2.5mg PO qMonday until specified by surgical team     Hematology:    # Acute blood loss anemia  - Preop hbg 12.1 on 11/30 (EBL 1.1L)   - Preop platelets 292 on 11/08  - Transfuse if hgb less than 7.0  - CBC daily      Musculoskeletal:  # Hx TMJ syndrome  # Hx rheumatoid arthritis  - Monitor for acute pain   - PT/OT to eval and treat     Skin:  # Surgical incision  - Surgical site care to be indicated by surgical team     General Cares/Prophylaxis:    DVT Prophylaxis: Pneumatic Compression Devices  GI Prophylaxis: PPI  Restraints: Not indicated  Family Communication:   Code Status: Full code     Lines/tubes/drains:  - PIV x2  - Hicks catheter - discontinue   - Arterial line L radial - discontinue   - Hemovac drain x2     Disposition:  - Medicine Service     The patient's care was discussed with the Attending Physician, Dr. Fernandez.  Critical care time exclusive of procedures: 38 min     NICKO Ar[facility]  Securely message with the Vocera Web Console (learn more here)  Text page via Zipments  Gopiing/y     _____________________________________________________________    Interval History   Overnight events reviewed.  A&O x 4, normotensive, pain being managed with orders and this AM patient reported low level pain.     Physical Exam   Vital Signs: Temp: 98.4  F (36.9  C) Temp src: Oral BP: 116/81 Pulse: 109   Resp: 12 SpO2: 97 % O2 Device: BiPAP/CPAP Oxygen Delivery: 6 LPM  Weight: 195 lbs 12.3 oz  GEN: alert, calm, pleasant woman, not in distress  EYES: PERRL, Anicteric sclera.   HEENT:  Normocephalic, atraumatic, trachea midline, Pupils PERRL  CV: RRR, no gallops, rubs, or murmurs  PULM/CHEST: Clear breath sounds bilaterally without rhonchi, crackles or wheeze, symmetric chest rise, wearing nasal CPAP on exam   GI: normal bowel sounds, soft, non-tender, no rebound tenderness or guarding,   : bond catheter in place, urine yellow and clear  EXTREMITIES: no peripheral edema, moving all extremities, peripheral pulses intact  NEURO: A&O x 4, following commands, equal strengths in uppers/lowers, + CMS on all 4 extremities   SKIN: JUAN J incision, hemovac with thin red blood (small amount)   PSYCH:  Affect: appropriate , not anxious, mentation at baseline,     Data   I reviewed all medications, new labs and imaging results over the last 24 hours.  Arterial Blood Gases   Recent Labs   Lab 11/30/22  1332 11/30/22  1220 11/30/22  1100 11/30/22  0924   PH 7.34* 7.35 7.37 7.41   PCO2 49* 50* 47* 47*   PO2 142* 100 169* 106*   HCO3 26 28 27 29*       Complete Blood Count   Recent Labs   Lab 12/01/22  0426 11/30/22  2119 11/30/22  1332 11/30/22  1220   WBC 14.5* 17.0*  --   --    HGB 10.7* 11.3* 9.9* 10.7*    277  --   --        Basic Metabolic Panel  Recent Labs   Lab 12/01/22  0430 12/01/22  0426 12/01/22  0028 11/30/22  2119 11/30/22  1958 11/30/22  1332 11/30/22  1220   NA  --  139  --  138  --  139 140   POTASSIUM  --  3.8  --  3.8  --  3.2* 3.3*   CHLORIDE  --  104  --  105  --   --   --    CO2  --   30  --  26  --   --   --    BUN  --  12  --  12  --   --   --    CR  --  0.70  --  0.84  --   --   --    * 124* 124* 142*   < > 129* 126*    < > = values in this interval not displayed.       Liver Function Tests  Recent Labs   Lab 12/01/22  0426 11/30/22  2119   * 101*   ALT 57* 57*   ALKPHOS 60 57   BILITOTAL 0.3 0.3   ALBUMIN 2.9* 3.1*       Pancreatic Enzymes  No lab results found in last 7 days.    Coagulation Profile  No lab results found in last 7 days.    IMAGING:  Recent Results (from the past 24 hour(s))   XR Surgery SEDRICK L/T 5 Min Fluoro    Narrative    This exam was marked as non-reportable because it will not be read by a   radiologist or a [facility] non-radiologist provider.

## 2022-12-02 ENCOUNTER — APPOINTMENT (OUTPATIENT)
Dept: OCCUPATIONAL THERAPY | Facility: CLINIC | Age: 52
DRG: 454 | End: 2022-12-02
Attending: ORTHOPAEDIC SURGERY
Payer: COMMERCIAL

## 2022-12-02 ENCOUNTER — APPOINTMENT (OUTPATIENT)
Dept: PHYSICAL THERAPY | Facility: CLINIC | Age: 52
DRG: 454 | End: 2022-12-02
Attending: ORTHOPAEDIC SURGERY
Payer: COMMERCIAL

## 2022-12-02 LAB
HOLD SPECIMEN: NORMAL
MAGNESIUM SERPL-MCNC: 2.1 MG/DL (ref 1.6–2.3)
PHOSPHATE SERPL-MCNC: 1.7 MG/DL (ref 2.5–4.5)
PHOSPHATE SERPL-MCNC: 2.7 MG/DL (ref 2.5–4.5)
POTASSIUM BLD-SCNC: 4.1 MMOL/L (ref 3.4–5.3)

## 2022-12-02 PROCEDURE — 97116 GAIT TRAINING THERAPY: CPT | Mod: GP

## 2022-12-02 PROCEDURE — 84132 ASSAY OF SERUM POTASSIUM: CPT | Performed by: NURSE PRACTITIONER

## 2022-12-02 PROCEDURE — 250N000013 HC RX MED GY IP 250 OP 250 PS 637: Performed by: ORTHOPAEDIC SURGERY

## 2022-12-02 PROCEDURE — 84100 ASSAY OF PHOSPHORUS: CPT | Performed by: NURSE PRACTITIONER

## 2022-12-02 PROCEDURE — 250N000013 HC RX MED GY IP 250 OP 250 PS 637

## 2022-12-02 PROCEDURE — 250N000013 HC RX MED GY IP 250 OP 250 PS 637: Performed by: INTERNAL MEDICINE

## 2022-12-02 PROCEDURE — 97535 SELF CARE MNGMENT TRAINING: CPT | Mod: GO

## 2022-12-02 PROCEDURE — 200N000002 HC R&B ICU UMMC

## 2022-12-02 PROCEDURE — 99233 SBSQ HOSP IP/OBS HIGH 50: CPT | Performed by: PHYSICIAN ASSISTANT

## 2022-12-02 PROCEDURE — 250N000009 HC RX 250: Performed by: ANESTHESIOLOGY

## 2022-12-02 PROCEDURE — 250N000013 HC RX MED GY IP 250 OP 250 PS 637: Performed by: NURSE PRACTITIONER

## 2022-12-02 PROCEDURE — 83735 ASSAY OF MAGNESIUM: CPT | Performed by: NURSE PRACTITIONER

## 2022-12-02 PROCEDURE — 97165 OT EVAL LOW COMPLEX 30 MIN: CPT | Mod: GO

## 2022-12-02 PROCEDURE — 258N000003 HC RX IP 258 OP 636: Performed by: ANESTHESIOLOGY

## 2022-12-02 PROCEDURE — 99232 SBSQ HOSP IP/OBS MODERATE 35: CPT | Performed by: INTERNAL MEDICINE

## 2022-12-02 PROCEDURE — 97530 THERAPEUTIC ACTIVITIES: CPT | Mod: GP

## 2022-12-02 PROCEDURE — 84100 ASSAY OF PHOSPHORUS: CPT | Performed by: INTERNAL MEDICINE

## 2022-12-02 PROCEDURE — 36415 COLL VENOUS BLD VENIPUNCTURE: CPT | Performed by: INTERNAL MEDICINE

## 2022-12-02 PROCEDURE — 36415 COLL VENOUS BLD VENIPUNCTURE: CPT | Performed by: NURSE PRACTITIONER

## 2022-12-02 RX ORDER — DEXTROSE MONOHYDRATE 25 G/50ML
25-50 INJECTION, SOLUTION INTRAVENOUS
Status: DISCONTINUED | OUTPATIENT
Start: 2022-12-02 | End: 2022-12-06 | Stop reason: HOSPADM

## 2022-12-02 RX ORDER — HYDROMORPHONE HCL IN WATER/PF 6 MG/30 ML
.2-.4 PATIENT CONTROLLED ANALGESIA SYRINGE INTRAVENOUS
Status: DISCONTINUED | OUTPATIENT
Start: 2022-12-02 | End: 2022-12-06 | Stop reason: HOSPADM

## 2022-12-02 RX ORDER — OXYCODONE HYDROCHLORIDE 5 MG/1
5 TABLET ORAL
Status: DISCONTINUED | OUTPATIENT
Start: 2022-12-02 | End: 2022-12-06 | Stop reason: HOSPADM

## 2022-12-02 RX ORDER — OXYCODONE HYDROCHLORIDE 10 MG/1
10 TABLET ORAL
Status: DISCONTINUED | OUTPATIENT
Start: 2022-12-02 | End: 2022-12-06 | Stop reason: HOSPADM

## 2022-12-02 RX ORDER — NICOTINE POLACRILEX 4 MG
15-30 LOZENGE BUCCAL
Status: DISCONTINUED | OUTPATIENT
Start: 2022-12-02 | End: 2022-12-06 | Stop reason: HOSPADM

## 2022-12-02 RX ADMIN — POTASSIUM & SODIUM PHOSPHATES POWDER PACK 280-160-250 MG 2 PACKET: 280-160-250 PACK at 18:34

## 2022-12-02 RX ADMIN — HYDROXYCHLOROQUINE SULFATE 200 MG: 200 TABLET, FILM COATED ORAL at 08:40

## 2022-12-02 RX ADMIN — ACETAMINOPHEN 975 MG: 325 TABLET, FILM COATED ORAL at 11:30

## 2022-12-02 RX ADMIN — DULOXETINE 120 MG: 60 CAPSULE, DELAYED RELEASE ORAL at 21:41

## 2022-12-02 RX ADMIN — OXYCODONE HYDROCHLORIDE 10 MG: 10 TABLET ORAL at 15:37

## 2022-12-02 RX ADMIN — OMEPRAZOLE 40 MG: 20 CAPSULE, DELAYED RELEASE ORAL at 21:42

## 2022-12-02 RX ADMIN — OXYCODONE HYDROCHLORIDE 10 MG: 10 TABLET ORAL at 03:11

## 2022-12-02 RX ADMIN — BUSPIRONE HYDROCHLORIDE 30 MG: 10 TABLET ORAL at 21:42

## 2022-12-02 RX ADMIN — POLYETHYLENE GLYCOL 3350 17 G: 17 POWDER, FOR SOLUTION ORAL at 08:40

## 2022-12-02 RX ADMIN — Medication 1000 UNITS: at 08:39

## 2022-12-02 RX ADMIN — METHOCARBAMOL 500 MG: 500 TABLET ORAL at 09:28

## 2022-12-02 RX ADMIN — Medication 125 MCG: at 08:39

## 2022-12-02 RX ADMIN — METHOCARBAMOL 500 MG: 500 TABLET ORAL at 21:43

## 2022-12-02 RX ADMIN — ARIPIPRAZOLE 5 MG: 5 TABLET ORAL at 08:39

## 2022-12-02 RX ADMIN — KETAMINE HYDROCHLORIDE 5 MG/HR: 100 INJECTION, SOLUTION, CONCENTRATE INTRAMUSCULAR; INTRAVENOUS at 15:35

## 2022-12-02 RX ADMIN — OXYCODONE HYDROCHLORIDE 10 MG: 10 TABLET ORAL at 07:04

## 2022-12-02 RX ADMIN — SENNOSIDES AND DOCUSATE SODIUM 1 TABLET: 50; 8.6 TABLET ORAL at 21:43

## 2022-12-02 RX ADMIN — Medication 250 MG: at 08:39

## 2022-12-02 RX ADMIN — PREGABALIN 150 MG: 25 CAPSULE ORAL at 08:39

## 2022-12-02 RX ADMIN — HYDROXYCHLOROQUINE SULFATE 200 MG: 200 TABLET, FILM COATED ORAL at 21:42

## 2022-12-02 RX ADMIN — OXYCODONE HYDROCHLORIDE 10 MG: 10 TABLET ORAL at 21:43

## 2022-12-02 RX ADMIN — ACETAMINOPHEN 975 MG: 325 TABLET, FILM COATED ORAL at 04:14

## 2022-12-02 RX ADMIN — ACETAMINOPHEN 975 MG: 325 TABLET, FILM COATED ORAL at 21:41

## 2022-12-02 RX ADMIN — PREGABALIN 150 MG: 25 CAPSULE ORAL at 14:41

## 2022-12-02 RX ADMIN — OXYCODONE HYDROCHLORIDE 10 MG: 10 TABLET ORAL at 11:31

## 2022-12-02 RX ADMIN — POTASSIUM & SODIUM PHOSPHATES POWDER PACK 280-160-250 MG 2 PACKET: 280-160-250 PACK at 09:28

## 2022-12-02 RX ADMIN — Medication 500 MG: at 08:39

## 2022-12-02 RX ADMIN — BUSPIRONE HYDROCHLORIDE 30 MG: 10 TABLET ORAL at 08:39

## 2022-12-02 RX ADMIN — MONTELUKAST 10 MG: 10 TABLET, FILM COATED ORAL at 08:39

## 2022-12-02 RX ADMIN — FOLIC ACID 1 MG: 1 TABLET ORAL at 21:43

## 2022-12-02 RX ADMIN — ROPINIROLE HYDROCHLORIDE 0.5 MG: 0.25 TABLET, FILM COATED ORAL at 21:42

## 2022-12-02 RX ADMIN — SENNOSIDES AND DOCUSATE SODIUM 1 TABLET: 50; 8.6 TABLET ORAL at 08:39

## 2022-12-02 RX ADMIN — POTASSIUM & SODIUM PHOSPHATES POWDER PACK 280-160-250 MG 2 PACKET: 280-160-250 PACK at 14:03

## 2022-12-02 ASSESSMENT — ACTIVITIES OF DAILY LIVING (ADL)
ADLS_ACUITY_SCORE: 25
ADLS_ACUITY_SCORE: 25
ADLS_ACUITY_SCORE: 30
ADLS_ACUITY_SCORE: 25
ADLS_ACUITY_SCORE: 30
ADLS_ACUITY_SCORE: 25

## 2022-12-02 NOTE — OP NOTE
Date of Service:  11/30/2022       Surgeon (primary):  Philip Wilhelm MD   Co-Surgeon:  Henry Sands MD, Neurosurgery.  Co-surgery was justified and warranted given case complexity and anticipated difficult surgery.    Assistant:  Bucky Garrett MD PGY-1      Preoperative Diagnosis:     1.  Multilevel lumbar spondylosis with stenosis, L1-S1, with neurogenic claudication.  2.  Progressive spinal sagittal malalignment (progression between 11/2021 and 08/2022 x-rays) with thoracolumbar kyphosis (T10-L2= 31 deg kyphosis) and lumbar flatback deformity (LL = 31, ideal 49).  3.  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  4.  S/p laminectomies T11-L1 (8/19/19, Dr. Lian Causey).  5.  Smoking history x 35 yrs, quit in November 2021.  6.  Class II obesity (current BMI 35.7 on 8/16/22, decreased from 39.8 on 6/2/22).  7.  Osteopenia (DEXA 11/24/21; T-score = -1.1).      Postoperative Diagnosis:     Same      Procedures:   1.  Erickson-Nixon (Gr 2 Schwab) osteotomies L1-S1 (5 levels).  2.  Transforaminal lumbar interbody fusion (TLIF) via bilateral facetectomies and diskectomies L1-S1 (5 levels).  3.  Use of local autograft and Infuse BMP for interbody fusion L1-S1 (5 levels).  4.  Placement of interbody device (bilateral titanium-coated PEEK cages) L1-S1 (5 levels).  5.  Bilateral segmental pedicle screw fixation T8-S1.  6.  Bilateral stacked pelvic screw fixation.  7.  Bilateral inferior facetectomies (Gr 1 Schwab osteotomies) T8-L1 (5 levels).  8.  Posterior spinal fusion T8-S1, using local autograft and allograft; Infuse BMP across T8-9 only.  9.  Computer navigation using O-arm and Stealth.     10.  Intraoperative spinal cord monitoring using SSEPs, tcMEPs, and sphincter and bilateral LE EMGs (NuVasive).      A -22 modifier is justified for use in this case given the following factors: previous surgery (T11-L1 laminectomy) with altered anatomy and significant paraspinal and epidural scarring; Class 2 obesity; and  advanced multilevel lumbar spondylosis and stenosis with thoracolumbar junction kyphotic deformity.  All these factors required significant extra time and effort > 25% usual in performing all parts of the procedure including exposure, instrumentation, decompression, osteotomy, and interbody fusion.  Furthermore, need for bilateral stacked pelvic screw fixation (4 screws) necessitates greater time and effort > 25% usual in placing pelvic screws.   Anesthesia:  General endotracheal.   Local anesthetic:  N/A; patient received intraoperative IV lidocaine infusion.  EBL:  1,100 mL (cellsaver return 410 mL).  Complications:  None apparent.   Table position change prior to janet placement:  N/A.  Implants / Equipment used:   1.  Medtronic Solera screw system: T8 = 5.0 x 50 mm right, 6.5 x 40 mm left; T9 = 5.5 x 45 mm bilateral; T10 = 5.5 x 50 mm right, 5.5 x 45 mm left; T11 = 6.5 x 50 mm right, 6.5 x 45 mm DRMAS left; T12 = 6.5 x 50 mm DRMAS right, 6.5 x 50 mm left; L1 = 6.5 x 50 mm bilateral; L2 = 6.5 x 50 mm bilateral; L3 = 6.5 x 50 mm bilateral; L4 = 7.5 x 50 mm bilateral; L5 = 7.5 x 50 mm bilateral; S1 = 7.5 x 40 mm bilateral.  5.5 mm CoCr rods x 4 (UNID patient-specific rods x 2, and accessory rods x 2).  2.  Medtronic Capstone Control titanium coated PEEK (PTC) cages 22 mm length: L1-2 = 11 mm height 6 degrees x 2 cages; L2-3 = 10 mm height 6 degrees x 2 cages; L3-4 = 11 mm height 6 degrees x 2 cages; L4-5 = 11 mm height 6 degrees x 2 cages; L5-S1 = 11 mm height 12 degrees right, 10 mm height 12 degrees left.  3.  SI-Bone Granite screw system: Upper iliac = 10.5 x 90 mm bilateral; lower S2AI = 10.5 x 90 mm right, 10.5 x 100 mm left.  4.  TXA high dose (30/10).  5.  Vancomycin powder 2 gm deep.  6.  Large kit Infuse BMP.  7.  Magnifuse allograft 20cm strips x 2.  8.  Crushed cancellous allograft 60 mL.  9.  Aquamantys with 6mm handpiece.      Indications:  52 year old female with chronic and bilateral R>L radicular leg  pain, with neurogenic claudication.  Imaging revealed sagittal malalignment with thoracolumbar junction kyphosis; advanced multilevel lumbar spondylosis with vacuum disc signal, and multilevel stenosis.  Tried nonoperative treatment, continues to have significant disabling symptoms.  I thus offered surgery in form of multilevel decompression and osteotomies with fusion from T8-sacrum, with bilateral pelvic fixation.  Consented after thorough discussion of the rationale, risks, benefits and alternatives.  Discussed bone graft options; consented to use of Infuse BMP and allograft.      Details:  Properly identified in preop area, site marked, informed consent signed.  Wheeled to OR.  Brief earlier performed.  General anesthesia administered.  Hicks inserted.  Antibiotic given: Cefazolin IV.  TXA started.  Positioned prone on Trios table with combo pads; thus, no table flexion.  Thoracolumbar region squared off, prepped with Chlorhexidine, alcohol and ChloraPrep, and draped in sterile fashion. Timeout performed; both Dr. Sands and myself present for timeout.  Baseline spinal cord signals obtained.  Good signals from hands and feet; low baseline signals from bilateral legs (vastus, adductors).  Patient given muscle relaxation for ~ 1 hour to facilitate exposure.    Midline skin incision made, incorporating previous surgical scar; bilateral subperiosteal exposure out to transverse processes T8-pelvis.  Hemostasis achieved with help of Aquamantys bipolar sealant.  Local anatomy and presence of laminectomy defects at known levels used for provisional localization.  Spinous process clamp with joann frame applied at ~L2, oriented caudally.  AP-Lateral images and 3D scan obtained T8-L1.  Easily able to identify known ankylosis (autofusion) at T12-L1 on images, which we used for localization.  Furthermore, levels agreed upon by 2 attending surgeons (Dr. Sands and myself); thus, radiologist verification not warranted.  Confirmatory  timeout performed.    Pedicle screws placed using navigation bilateral L1 to T8, strating at L1 and proceeding cephalad.  Starting points identified using landmarks and joann probe.   holes created using kasandra.  Tracks created using joann awl or joann drill with 30 mm stop.  Stealth used to select screw sizes.  Undertapped by 1 size using joann tap.  Screws inserted using joann .  We used dual head screws at right T12 and left T11, to facilitate multi-janet construct.  Check scan showed good placement of all screws.  Patient had good BMD on opportunistic BMD measurement on preop CT; thus, we did not use fenestrated screws.    Spinous process clamp with joann frame repositioned to L5, oriented caudally.  3D scan obtained L2-S1 for joann purposes.  Pedicle screws placed L2-S1 bilaterally, starting at L2 and proceeding caudally, in similar fashion as above.  Check scan showed good placement of all screws.  Spinous process clamp with rafiq frame repositioned to ~L2, oriented cephalad.  3D scan obtained of pelvis, for joann purposes.    Bilateral stacked pelvic fixation obtained using lower S2AI screws and upper iliac screws, using ingrowth (Granite) screws.  The lower S2AI screws were inserted first.  Starting points identified using navigation and anatomic landmarks.  Tracks created using joann awl up to SI joints, then joann drill with 90 mm stop past the SI joints into the ilium.  Powerease joann tap to depth of intended screws 6.5 and 10.5 mm.  Screws inserted using Powerease joann .  We then placed upper iliac screws at the level of S1 foramen.  Starting points on medial aspect of ilium created using kasandra.  Tracks created using joann awl and joann drill with 90 mm stop.  Power ease joann tap to depth of intended screws 6.5 and 10.5 mm.  Screws inserted using power ease joann .  Check scan showed good placement of all screws.      Erickson-Nixon oteotomy (SPO) and transforaminal lumbar interbody fusion (TLIF) performed L5-S1.   Interspinous ligament removed using rongeur.  Resected bilateral descending processes using kasandra and osteotome.  Bone saved for grafting purposes.  Bilateral ascending processes resected using osteotome and Kerrison punches, flush to pedicles.  Bleeders controlled using bipolar, Surgiflo and cottonoids.  Ligamentum flavum resected using Kerrisons.  Achieved complete bilateral and central dorsal decompression.  TLIF performed.  Bilateral rectangular annulotomies and diskectomies performed.  Disc space release obtained using blunt paddle distractors.  Endplates prepared using down and up-angled curettes.  Lateral posterior annulus further released using Alysa curettes; made sure to protect/avoid the exiting nerve root.  Disc material removed using Blakesley rongeur.  Palpated canal to ensure adequate ventral decompression.  Placed 12 degree trial spacers.  Cage height selected; cages filled with half a sponge of BMP and local autograft.  A Large kit Infuse BMP was earlier prepared; this contained 6 sponges.  One-half sponge was used per cage; thus, 1 sponge per interbody fusion level L1-S1.  The remaining BMP sponge was later used for posterior fusion mainly at T8-9.  Impacted left-sided cage first; packed ~ 6 mL local bone from contralateral side, prior to impacting contralateral cage.  Ensured anterior cage position on lateral images.  Packed further local bone lateral and posterior to the cages as able.    Above procedure likewise performed at L4-5, L3-4, L2-3 and L1-2 (in that order); thus, total 5 levels of TLIF-SPO.  At L1-L5 levels (4 levels), 6 degree cages were used.  At each level, left cage was inserted first, and ~6 mL local autograft was packed into the disc from the right side prior to inserting the right cage.    Bilateral inferior facetectomies (Schwab Gr 1 osteotomies) performed at 5 levels T8-L1.  Bilateral descending processes removed using osteotome and rongeur; bone saved for grafting purposes.   This exposed the cartilaginous surface of the ascending processes, to enhance posterior fusion; also gave incremental posterior release to facilitate deformity correction.    Used UNID patient-specific rods as main rods from T8-S2AI bilateral.  Rods seated; set plugs applied starting the the bottom and working our way up proximally, using janet reduction towers as needed.  Stitched AP-lat images showed good coronal and sagittal alignment.  I deemed that the proximal levels could perhaps benefit from a little additional kyphosis.  We used tandem in situ benders to try to add more kyphosis to bilateral rods simultaneously at the upper ~3 levels.  We then measured and cut our accessory rods, and seated these and applied set plugs.  Final ap-lat stitched images showed acceptable ap-lat alignment.    Posterior fusion performed T8-S1.  Posterior elements (lamina and spinous processes) decorticated using kasandra.  Bone graft (Infuse BMP sponge at T8-9; Magnifuse allograft strips across the SPO levels; local autograft and allograft across all levels) placed over decorticated posterior fusion bed.    Two deep medium Hemovac drains placed.  Vanco powder applied 2 gms deep.  Layered closure: #1 vicryl popoffs for deep fascia, running 0 vicryl for deep subq, 2-0 Vicryl for subq and 3-0 Monocryl.  Injected anesthetic.  Skin glue and sterile dressings applied.  Turned supine, taken off general anesthetic, transferred to PACU in satisfactory condition.       Postop:  Admit to ICU after PACU stay; for closer hemodynamic monitoring x 1-2 days, then transfer to regular surgical floor.  Antibiotics x 24 hrs.  Mechanical DVT prophylaxis.  Hospitalist medical co-management.  PT and OT consult.  ADAT.  No lifting more than 10 pounds, no excessive bending or twisting.  Orthotics referral for Purchase brace, to be worn when out of bed for next 6-12 weeks.   Full spine AP-lat standing x-rays prior to discharge.  Anticipate discharge in 4-6 days.   RTC 6 weeks with EOS full-spine AP-lat x-rays.

## 2022-12-02 NOTE — PROGRESS NOTES
"Pain Service Progress Note  [facility]  Date: 12/02/2022       Patient Name: Zayra Ramirez  MRN: [id number]  Age: 52 year old  Sex: female      Assessment:  Zayra Ramirez is a 52 year old female who has PMH of \"HTN, ROBERT w/ CPAP w/ supplemental O2, ILD, former tobacco use with cessation in 11/2021, anxiety, depression, borderline personality disorder, PTSD, restless leg syndrome, neuropathy, morbid obesity, GERD, TMJ syndrome, rheumatoid arthritis, chronic immunosuppression, chronic right shoulder pain, pseudogout, and previous other spinal surgeries who was admitted on 11/30/2022 for a posterior instrumented spinal fusion of their T8 to S1 with bilateral pelvic fixation and transforaminal lumbar fusion from L1 to S1.\"     Pain service consulted to assist with pain management.     PTA, Zayra was opioid naive.    Interval history.  Zayra states she is \"uncomfortable\" pain wise.  States pain is in the \"mid back\" and is 7/10 level.  Stated did not sleep well due to pain and discomfort with the hospital bed.  Has been able to ambulate with walker in the room and sitting in the chair. Discussed changing pain regimen for more around the clock pain relief which she is agreeable.    Plan/Recommendations:  1. Continue ketamine infusion at 5mg/hour. Consider discontinuing on 12/2/22 afternoon or on 12/3/22.  2. Change oxycodone 5-10mg PO Q 3 hours PRN.             As pain improves, decrease by 1 dose/day every 1-2 days until completely off.  Pt is opioid naive at baseline.  3. Change IV Dilaudid 0.2-0.4mg IV Q 2 hours PRN,  if oral opioid not adequately controlling pain.   Taper off in 1-3 days or 24 hours prior to discharge  4. Continue PTA Lyrica, cymbalta.  5. Continue acetaminophen.  6. Bowel regimen.    Pain Service will sign off.    Discussed with attending anesthesiologist    Please Page the Pain Team Via Am[employer]: \"PAIN MANAGEMENT ACUTE INPATIENT/ [facility]\"    Rayna Hernandez " "BRE  12/02/2022           Diet: Advance Diet as Tolerated: Regular Diet Adult  Snacks/Supplements Adult: Other; Please allow pt/RN to order snacks/supplements PRN; Between Meals    Relevant Labs:  Recent Labs   Lab Test 12/01/22  0426 08/20/19  0616 08/19/19  0416 08/18/19  2126   INR  --   --   --  1.09      < >  --  363   PTT  --   --  37  --    BUN 12   < >  --  10    < > = values in this interval not displayed.       Physical Exam:  Vitals: /70 (BP Location: Right arm)   Pulse 98   Temp 98.3  F (36.8  C) (Axillary)   Resp 21   Ht 1.626 m (5' 4.02\")   Wt 88.2 kg (194 lb 7.1 oz)   LMP  (LMP Unknown)   SpO2 95%   BMI 33.36 kg/m      Physical Exam:   CONSTITUTIONAL/GENERAL APPEARANCE: Conversant.  EYES: EOMI, sclerae clear  ENT/NECK: neck is supple  RESPIRATORY:non labored breathing, on room air  CARDIOVASCULAR: HR within normal limits  GI:soft, nontender,   MUSCULOSKELETAL/BACK/SPINE/EXTREMITIES: Moving UE and LE independently.     NEURO:  AAOx3.   SKIN/VASCULAR EXAM:  Dry and warm.  PSYCHIATRIC/BEHAVIORAL/OBSERVATIONS:  Winces with movements in bed.   Judgment/Insight -fair   Orientation - x3   Memory -fair   Mood and affect - calm, pleasant, cooperative          Relevant Medications:  Current Pain Medications:  Medications related to Pain Management (From now, onward)    Start     Dose/Rate Route Frequency Ordered Stop    12/03/22 0000  acetaminophen (TYLENOL) tablet 650 mg         650 mg Oral EVERY 4 HOURS PRN 11/30/22 1947 12/01/22 0800  pregabalin (LYRICA) capsule 150 mg        Note to Pharmacy: PTA Sig:Take 1 capsule (150 mg) by mouth 2 times daily  Patient taking differently: Take 150 mg by mouth 2 times daily AM and around 1 PM      150 mg Oral 2 TIMES DAILY 11/30/22 1945 12/01/22 0800  polyethylene glycol (MIRALAX) Packet 17 g         17 g Oral DAILY 11/30/22 1759 11/30/22 2355  methocarbamol (ROBAXIN) tablet 500 mg         500 mg Oral EVERY 6 HOURS PRN 11/30/22 2355   "    11/30/22 2335  HYDROmorphone (PF) (DILAUDID) injection 0.3-0.5 mg         0.3-0.5 mg Intravenous EVERY 1 HOUR PRN 11/30/22 2336 11/30/22 2000  busPIRone (BUSPAR) tablet 30 mg        Note to Pharmacy: PTA Sig:Take 30 mg by mouth 2 times daily       30 mg Oral 2 TIMES DAILY 11/30/22 1945 11/30/22 2000  DULoxetine (CYMBALTA) DR capsule 120 mg        Note to Pharmacy: PTA Sig:Take 120 mg by mouth At Bedtime       120 mg Oral AT BEDTIME 11/30/22 1945 11/30/22 2000  senna-docusate (SENOKOT-S/PERICOLACE) 8.6-50 MG per tablet 1 tablet        See Hyperspace for full Linked Orders Report.    1 tablet Oral 2 TIMES DAILY 11/30/22 1945 11/30/22 2000  senna-docusate (SENOKOT-S/PERICOLACE) 8.6-50 MG per tablet 2 tablet        See Hyperspace for full Linked Orders Report.    2 tablet Oral 2 TIMES DAILY 11/30/22 1945 11/30/22 2000  [Held by provider]  cyclobenzaprine (FLEXERIL) tablet 10 mg        (Held by provider since Wed 11/30/2022 at 2355 by Amanda Soni NP.Hold Reason: Other)   Note to Pharmacy: PTA Sig:Take 1 tablet (10 mg) by mouth At Bedtime      10 mg Oral AT BEDTIME 11/30/22 1637      11/30/22 2000  acetaminophen (TYLENOL) tablet 975 mg         975 mg Oral EVERY 8 HOURS 11/30/22 1947 12/03/22 1959 11/30/22 1947  hydrOXYzine (ATARAX) tablet 25 mg         25 mg Oral EVERY 6 HOURS PRN 11/30/22 1947 11/30/22 1947  oxyCODONE (ROXICODONE) tablet 5 mg        See Hyperspace for full Linked Orders Report.    5 mg Oral EVERY 4 HOURS PRN 11/30/22 1947 11/30/22 1947  oxyCODONE IR (ROXICODONE) tablet 10 mg        See Hyperspace for full Linked Orders Report.    10 mg Oral EVERY 4 HOURS PRN 11/30/22 1947 11/30/22 1947  lidocaine (LMX4) cream          Topical EVERY 1 HOUR PRN 11/30/22 1947 11/30/22 1947  lidocaine 1 % 0.1-1 mL         0.1-1 mL Other EVERY 1 HOUR PRN 11/30/22 1947 12/03/22 1946 11/30/22 1945  diclofenac (VOLTAREN) 1 % topical gel 4 g         4 g Topical 4 TIMES  DAILY PRN 11/30/22 1945 11/30/22 1830  ketamine bolus from infusion pump 10.94 mg         0.2 mg/kg × 54.7 kg (Ideal) Intravenous ONCE 11/30/22 1828      11/30/22 1753  magnesium hydroxide (MILK OF MAGNESIA) suspension 30 mL         30 mL Oral DAILY PRN 11/30/22 1759 11/30/22 1753  bisacodyl (DULCOLAX) suppository 10 mg         10 mg Rectal DAILY PRN 11/30/22 1759 11/30/22 1700  ketamine (KETALAR) 2 mg/mL in sodium chloride 0.9 % 50 mL ANALGESIA infusion         5-10 mg/hr  2.5-5 mL/hr  Intravenous CONTINUOUS 11/30/22 1630            Primary Service Contacted with Recommendations? Yes      Time/Communication:  I personally spent 20 minutes with greater than 50% in consultation, education, counseling and coordination of care.  See note above for details on conversation.

## 2022-12-02 NOTE — PROGRESS NOTES
ICU End of Shift Summary. See flowsheets for vital signs and detailed assessment.     Changes this shift:  Neuro: RASS 0.  Oriented x4.    Cardiac:  NSR.  BP WNL.   Respiratory: 3L bleed in O2 to CPAP while sleeping,- home setting.  LS clear.    GI/:  No BM this shift, bowel meds given.  Hicks removed yesterday, voided at 0600, PVR 42 cc  Diet/appetite:  Regular diet, minimal appetite,  Activity: Ambulated to the bathroom  Pain:  Back pain.  Scheduled tylenol given, PRN oxycodone, dilaudid, robaxin given with relief.  Ice packs applied.  Ketamine has been running at 5  Skin:  Dressings c/d/I.  No acute changes.  LDA's:  Hemovac x2 on upper back      Plan: PT/OT.  Manage pain.  Monitor spine incisions and drains.  Continue plan of care

## 2022-12-02 NOTE — PROGRESS NOTES
Orthopaedic Surgery Progress Note 12/02/2022    S: No acute events overnight. Pain controlled with current regimen. Denies extremity numbness or tingling. Denies chest pain, SOB, nausea/vomiting. Tolerating regular diet. -BM +flatus. Voiding spontaneously. OOB, working with therapy.     O:  Temp: 98.3  F (36.8  C) Temp src: Axillary BP: 115/70 Pulse: 98   Resp: 21 SpO2: 95 % O2 Device: BiPAP/CPAP Oxygen Delivery: 2 LPM    Exam:  Gen: No acute distress, resting comfortably in bed.  Resp: Non-labored breathing  CV: RRR  MSK: Back dressings CDI    Lower extremities:  Motor Strength Right Left   Hip flexion: L1, L2, L3 5/5 5/5   Hip adduction: L2, L3 5/5 5/5   Knee flexion: S1 5/5 5/5   Knee extension: L3, L4 5/5 5/5   Ankle dosiflexion: L4, L5 5/5 5/5   EHL: L5 5/5 5/5   Ankle plantarflexion: S1 5/5 5/5     Sensation from L1-S2 is preserved.    Output by Drain (mL) 11/30/22 0700 - 11/30/22 1459 11/30/22 1500 - 11/30/22 2259 11/30/22 2300 - 12/01/22 0659 12/01/22 0700 - 12/01/22 1459 12/01/22 1500 - 12/01/22 2259 12/01/22 2300 - 12/02/22 0659 12/02/22 0700 - 12/02/22 0911   Closed/Suction Drain Midline Back Accordion 10 Dutch  150 140 170 40 90    Closed/Suction Drain Inferior;Midline Back Accordion 10 Dutch  100 80 55 35 20      Assessment and Plan: Zayra Ramirez is a 52 year old female now s/p PISF T8 - pelvis; TLIF L1 S1 on 11/30 with Dr. Wilhelm.      12/2/2022  ICU cares  PT/OT, OOB as able  Pain control  Continue drains   Anticipate floor status     [facility] Primary  Activity: Up with assist until independent. No excessive bending or twisting. No lifting >10 lbs x 6 weeks. Encourage ambulation. If James lift needed, use high back sling.   Weight bearing status: WBAT.  Pain management: Transition from IV to PO as tolerated.    Antibiotics: Ancef x 24 hours - complete  Diet: Begin with clear fluids and progress diet as tolerated. Protein supplements TID.  DVT prophylaxis: SCDs only. No chemical DVT ppx needed  at discharge.  Imaging: XR Upright scoliosis PTDC - ordered.  Labs: PRN  Bracing/Splinting: [place] brace at all times   Dressings: Keep Aquacel c/d/i x 7 days.  Drains: Document output per shift, will be discontinued at Orthopedic Surgery discretion.  Hicks catheter: Removed  Physical Therapy/Occupational Therapy: Eval and treat.  Cultures: Pending, follow culture results closely.    Consults: Medicine.  Follow-up: Clinic with Dr. Wilhelm in 6 weeks with standing scoliosis x-rays needed.   Disposition: Pending progress with therapies, pain control on orals, and medical stability, anticipate discharge on POD #3-5.    Julian Garrison MD PGY4  Orthopaedic Surgery     FOLLOWUP:    Future Appointments   Date Time Provider Department Center   12/1/2022  8:15 AM Licha Pollard, PT [facility] [place]   12/1/2022  1:45 PM Licha Pollard, PT [facility]   12/2/2022  8:15 AM Crys Hong, OT [facility] [place]   1/10/2023 11:45 AM Dior Arroyo PA-C [facility]   1/12/2023 10:40 AM Philip Wilhelm MD [facility]   1/25/2023  1:00 PM Saumya Dobbins RD [facility]   2/3/2023  1:20 PM Dorothy Hong MD [facility]   2/14/2023  9:55 AM Morro Mijares MD [facility]   3/2/2023  1:00 PM Panchito Saenz MD [facility]   4/25/2023  1:00 PM Danni Dai PA-C [facility]LIE [facility]

## 2022-12-02 NOTE — PROGRESS NOTES
12/02/22 0915   Appointment Info   Signing Clinician's Name / Credentials (OT) Crys Hong MS, OTR/L, CLT   Rehab Comments (OT) spinal precautions; Marge brace   Living Environment   People in Home significant other   Current Living Arrangements house   Home Accessibility stairs to enter home;stairs within home   Number of Stairs, Main Entrance 2   Stair Railings, Main Entrance none   Number of Stairs, Within Home, Primary one   Transportation Anticipated family or friend will provide   Living Environment Comments Pt lives with SO who works during the day. Pt is able to remain on one level though does have a sunken living room. Pt has walk-in shower with a stool.   Self-Care   Usual Activity Tolerance good   Current Activity Tolerance fair   Regular Exercise No   Equipment Currently Used at Home none   Fall history within last six months no   Activity/Exercise/Self-Care Comment Pt previously ind with ADL   Instrumental Activities of Daily Living (IADL)   IADL Comments Pt reports she has friends who can assist   General Information   Onset of Illness/Injury or Date of Surgery 11/30/22   Referring Physician Bucky Garrett MD   Patient/Family Therapy Goal Statement (OT) to reduce pain   Additional Occupational Profile Info/Pertinent History of Current Problem Zayra Ramirez is a 52 year old female now s/p PISF T8 - pelvis; TLIF L1 S1 on 11/30 with Dr. Wilhelm.   Existing Precautions/Restrictions spinal;brace worn when out of bed   Left Upper Extremity (Weight-bearing Status) partial weight-bearing (PWB)  (10# lifting restriction)   Right Upper Extremity (Weight-bearing Status) partial weight-bearing (PWB)  (10# lifting restriction)   Left Lower Extremity (Weight-bearing Status) weight-bearing as tolerated (WBAT)   Right Lower Extremity (Weight-bearing Status) weight-bearing as tolerated (WBAT)   General Observations and Info [place] brace - conflicting orders for at all times vs OOB; MD paged   Cognitive  Status Examination   Orientation Status orientation to person, place and time   Pain Assessment   Patient Currently in Pain Yes, see Vital Sign flowsheet   Range of Motion Comprehensive   Comment, General Range of Motion BUE WFL   Strength Comprehensive (MMT)   Comment, General Manual Muscle Testing (MMT) Assessment n/t 2/2 post op precautions   Bed Mobility   Comment (Bed Mobility) min A   Transfers   Transfer Comments n/a 2/2 pain   Activities of Daily Living   BADL Assessment/Intervention lower body dressing;toileting;bathing   Bathing Assessment/Intervention   [place] Level (Bathing) minimum assist (75% patient effort)   Lower Body Dressing Assessment/Training   [place] Level (Lower Body Dressing) maximum assist (25% patient effort)   Toileting   [place] Level (Toileting) minimum assist (75% patient effort)   Clinical Impression   Criteria for Skilled Therapeutic Interventions Met (OT) Yes, treatment indicated   OT Diagnosis decreased I with ADL   OT Problem List-Impairments impacting ADL activity tolerance impaired;pain;post-surgical precautions   Assessment of Occupational Performance 1-3 Performance Deficits   Identified Performance Deficits dressing, bathing, toileting, transfers   Planned Therapy Interventions (OT) ADL retraining;balance training;transfer training   Clinical Decision Making Complexity (OT) low complexity   Anticipated Equipment Needs Upon Discharge (OT) dressing equipment   Risk & Benefits of therapy have been explained evaluation/treatment results reviewed;care plan/treatment goals reviewed;risks/benefits reviewed;current/potential barriers reviewed;participants voiced agreement with care plan;participants included;patient   Clinical Impression Comments Pt presents with impaired ADL 2/2 the above; pt to benefit from skilled OT to address and maximzie safety and I with ADL   OT Total Evaluation Time   OT Eval, Low Complexity Minutes (33150) 10   OT Goals   Therapy Frequency  (OT) Daily   OT Predicted Duration/Target Date for Goal Attainment 12/09/22   OT Goals Lower Body Dressing;Toilet Transfer/Toileting;OT Goal 1   OT: Lower Body Dressing Modified independent;within precautions   OT: Toilet Transfer/Toileting Modified independent;within precautions   OT: Goal 1 Pt will complete shower transfer mod I within precautions   OT Discharge Planning   OT Plan OT: LE dressing (bring AE), up to BR   OT Discharge Recommendation (DC Rec) home with assist   OT Rationale for DC Rec Pt limited by pain however anticipate with improved pain control and progress pt will be safe to d/c home with assist for heavy IADL 2/2 post op precautions   OT Brief overview of current status Ax1 to EOB   Total Session Time   Timed Code Treatment Minutes 24   Total Session Time (sum of timed and untimed services) 34

## 2022-12-02 NOTE — PROGRESS NOTES
"[facility], [place]   Internal Medicine Daily Note           Interval History/Events     Overnight events reviewed  Reports doing well  No nausea, vomiting, chest pain, shortness of breath  No fever, chills.          Review of Systems        4 point ROS including Respiratory, CV, GI and , other than that noted above is negative      Medications   I have reviewed current medications  in the \"current medication\" section of Epic.  Relevant changes include:     Physical Exam   General:       Vital signs:    Blood pressure 115/70, pulse 98, temperature 98.3  F (36.8  C), temperature source Axillary, resp. rate 21, height 1.626 m (5' 4.02\"), weight 88.2 kg (194 lb 7.1 oz), SpO2 95 %, not currently breastfeeding.  Estimated body mass index is 33.36 kg/m  as calculated from the following:    Height as of this encounter: 1.626 m (5' 4.02\").    Weight as of this encounter: 88.2 kg (194 lb 7.1 oz).      Intake/Output Summary (Last 24 hours) at 12/2/2022 1309  Last data filed at 12/2/2022 0800  Gross per 24 hour   Intake 435.54 ml   Output 440 ml   Net -4.46 ml        Constitutional: Laying in bed in no acute disrtress  Eye: No icterus, no pallor  Mouth/ENT: Normal oral mucosa  Cardiovascular: S1, S2 normal.   Respiratory: B/L CTA  GI: Soft, NT, BS+  :   Neurology: Alert, awake, and oriented.   Psych: Mood stable.   MSK:   Integumentary:   Heme/Lymph/Imm:      Laboratory and Imaging Studies     I have reviewed  laboratory and imaging studies in the Epic. Pertinent findings are as below:    BMP  Recent Labs   Lab 12/02/22  0551 12/01/22  0430 12/01/22  0426 12/01/22  0028 11/30/22  2119 11/30/22  1958 11/30/22  1332 11/30/22  1220   NA  --   --  139  --  138  --  139 140   POTASSIUM 4.1  --  3.8  --  3.8  --  3.2* 3.3*   CHLORIDE  --   --  104  --  105  --   --   --    NATALIYA  --   --  7.9*  --  7.6*  --   --   --    CO2  --   --  30  --  26  --   --   --    BUN  --   --  12  --  12  --   --   --  "   CR  --   --  0.70  --  0.84  --   --   --    GLC  --  125* 124* 124* 142*   < > 129* 126*    < > = values in this interval not displayed.     CBC  Recent Labs   Lab 12/01/22 0426 11/30/22 2119   WBC 14.5* 17.0*   RBC 3.36* 3.49*   HGB 10.7* 11.3*   HCT 31.9* 33.0*   MCV 95 95   MCH 31.8 32.4   MCHC 33.5 34.2   RDW 14.0 13.8    277     INRNo lab results found in last 7 days.  LFTs  Recent Labs   Lab 12/01/22 0426 11/30/22 2119   ALKPHOS 60 57   * 101*   ALT 57* 57*   BILITOTAL 0.3 0.3   PROTTOTAL 5.5* 5.7*   ALBUMIN 2.9* 3.1*      PANCNo lab results found in last 7 days.        Impression/Plan        52 year old female with PMH HTN, ROBERT w/ CPAP w/ supplemental O2, ILD, former tobacco use with cessation in 11/2021, anxiety, depression, borderline personality disorder, PTSD, restless leg syndrome, neuropathy, morbid obesity, GERD, TMJ syndrome, rheumatoid arthritis, chronic immunosuppression, chronic right shoulder pain, pseudogout, and previous other spinal surgeries who was admitted on 11/30/2022 for a posterior instrumented spinal fusion of their T8 to S1 with bilateral pelvic fixation and transforaminal lumbar fusion from L1 to S1.     The patient has had previous spinal surgeries since 2017, including: an anterior cervical discectomy and fusion (ACDF) in 10/2017, spinal cord stimulator implantation in 08/2019, that was complicated by an MSSA epidural abscess, and eventually removed, and T11-L2 laminectomies in 08/2019.     Patient is now being transferred to Medical floor for ongoing cares.       # s/p spinal surgery as above on 11/20/2022:  Management primarily per Ortho team.  - Wound cares, Dressings, Surgical pain management, DVT Prophylaxis  per primary team.   - Monitor anemia, hemodynamics, Input/Output, Capnography (for 24 hours)  - Encourage Incentive spirometry  - Laxatives for constipation prophylaxis  - Continue current pain regimen: Defer to primary team.      # Anemia: Most likely  due to  blood loss, and post surgical inflammation.   # Acute blood loss anemia  Preop hbg 12.1 on 11/30.  (EBL 1.1L). Hg 10.7 gm/dl   - Follow up Hg in AM  - Transfuse if Hg < 7 gm/dl      # Leukocytosis: likely d/t stress de margination. No overt signs of infection.   - Monitor   - CBC in AM       # Pain and sedation  # Hx restless leg syndrome  # Hx Anxiety and Depression  # Hx borderline personality disorder  # Hx PTSD  PTA on Pregabalin 150mg PO BID, Ropinirole 0.5mg PO at bedtime, Duloxetine 120mg PO at bedtime, Aripiprazole 5mg PO q Day, Buspirone 30mg PO BID  -Continue home regimen        # ROBERT with home CPAP  # ILD  - PTA albuterol inhaler 2 puffs q6 hrs PRN  - PTA albuterol neb 3ml q5 hrs PRN  - Continue home CPAP with patient's home settings  - Maintain O2 saturation greater than 92%     # Hx HTN  HOLDING PTA lisinopril/hydrochlorothiazide 20-25mg PO qDay       # Hx GERD  # Hx class III obesity  - PTA omeprazole 40mg PO qDay  - Regular diet for Nutrition      # Constipation  - (new) Senna 1-2 tabs PO BID  - (new) Miralax PO qDay  - (new) dulcolax 10mg UT qDay PRN      # Diabetes  - Hold PTA liraglutide   - Start Insulin Aspart medium intensity correction     # Hx rheumatoid arthritis  - Continue PTA hydroxychloroquine 200mg PO BID  - HOLDING PTA methotrexate 2.5mg PO qMonday until specified by surgical team     # Hx TMJ syndrome  # Hx rheumatoid arthritis  - Monitor for acute pain   - PT/OT to eval and treat   Pt's care was discussed with bedside RN, patient and  during Care Team Rounds.               Gautam Stevenson MD  Hospitalist ( Internal medicine)  Pager: [phone]

## 2022-12-02 NOTE — PROGRESS NOTES
Care Management Follow Up    Length of Stay (days): 2    Expected Discharge Date: TBD     Concerns to be Addressed: Discharge planning       Patient plan of care discussed at interdisciplinary rounds: Yes    Anticipated Discharge Disposition: Home     Anticipated Discharge Services: Home care-RN/PT/OT  Anticipated Discharge DME: TBD     Patient/family educated on Medicare website which has current facility and service quality ratings: No agency preference   Education Provided on the Discharge Plan:  yes  Patient/Family in Agreement with the Plan: yes    Referrals Placed by CM/SW:    Private pay costs discussed: Not applicable    Additional Information:  [facility] declined the referral. Spoke to Marsha at [facility] and they accepted for RN/PT/OT. Updated patient and she verbalized understanding and agreement to plan.       [place]   Phone- [phone]  Eda-[phone]    RONNIE Ho RNCC  RN Care Coordinator   Office: [phone]   Pager: [phone]

## 2022-12-02 NOTE — PROGRESS NOTES
Care Coordinator Progress Note    Admission Date/Time:  11/30/2022  Attending MD:  Philip Wilhelm*    Data  Chart reviewed, discussed with interdisciplinary team.   Patient was admitted for: Data Unavailable.      Coordination of Care and Referrals: Provided patient/family with options for Home Care.        Assessment  Patient is s/p spinal fusion and per initial Physical Therapy evaluation recommendation is for patient to discharge with home therapy.     I spoke with patient on the phone to introduce care coordination role and discuss home therapy options and referral process. Patient was agreeable to home therapy and had no preference in agency.     Initial referral placed to [facility].     Primary RNCC to follow is able to accept patient.      Indira Santos, GREY   Care Coordinator

## 2022-12-03 ENCOUNTER — APPOINTMENT (OUTPATIENT)
Dept: OCCUPATIONAL THERAPY | Facility: CLINIC | Age: 52
DRG: 454 | End: 2022-12-03
Attending: ORTHOPAEDIC SURGERY
Payer: COMMERCIAL

## 2022-12-03 LAB
BASOPHILS # BLD AUTO: 0 10E3/UL (ref 0–0.2)
BASOPHILS NFR BLD AUTO: 0 %
EOSINOPHIL # BLD AUTO: 0.1 10E3/UL (ref 0–0.7)
EOSINOPHIL NFR BLD AUTO: 1 %
ERYTHROCYTE [DISTWIDTH] IN BLOOD BY AUTOMATED COUNT: 13.4 % (ref 10–15)
GLUCOSE BLDC GLUCOMTR-MCNC: 118 MG/DL (ref 70–99)
GLUCOSE BLDC GLUCOMTR-MCNC: 72 MG/DL (ref 70–99)
GLUCOSE BLDC GLUCOMTR-MCNC: 75 MG/DL (ref 70–99)
GLUCOSE BLDC GLUCOMTR-MCNC: 82 MG/DL (ref 70–99)
GLUCOSE BLDC GLUCOMTR-MCNC: 99 MG/DL (ref 70–99)
HCT VFR BLD AUTO: 23.1 % (ref 35–47)
HGB BLD-MCNC: 7.6 G/DL (ref 11.7–15.7)
IMM GRANULOCYTES # BLD: 0.1 10E3/UL
IMM GRANULOCYTES NFR BLD: 1 %
LYMPHOCYTES # BLD AUTO: 1.4 10E3/UL (ref 0.8–5.3)
LYMPHOCYTES NFR BLD AUTO: 12 %
MAGNESIUM SERPL-MCNC: 2.1 MG/DL (ref 1.6–2.3)
MCH RBC QN AUTO: 31.8 PG (ref 26.5–33)
MCHC RBC AUTO-ENTMCNC: 32.9 G/DL (ref 31.5–36.5)
MCV RBC AUTO: 97 FL (ref 78–100)
MONOCYTES # BLD AUTO: 1.2 10E3/UL (ref 0–1.3)
MONOCYTES NFR BLD AUTO: 10 %
NEUTROPHILS # BLD AUTO: 8.9 10E3/UL (ref 1.6–8.3)
NEUTROPHILS NFR BLD AUTO: 76 %
NRBC # BLD AUTO: 0 10E3/UL
NRBC BLD AUTO-RTO: 0 /100
PHOSPHATE SERPL-MCNC: 3.1 MG/DL (ref 2.5–4.5)
PLATELET # BLD AUTO: 199 10E3/UL (ref 150–450)
POTASSIUM BLD-SCNC: 3.6 MMOL/L (ref 3.4–5.3)
RBC # BLD AUTO: 2.39 10E6/UL (ref 3.8–5.2)
WBC # BLD AUTO: 11.7 10E3/UL (ref 4–11)

## 2022-12-03 PROCEDURE — 84100 ASSAY OF PHOSPHORUS: CPT | Performed by: INTERNAL MEDICINE

## 2022-12-03 PROCEDURE — 250N000013 HC RX MED GY IP 250 OP 250 PS 637: Performed by: ORTHOPAEDIC SURGERY

## 2022-12-03 PROCEDURE — 120N000002 HC R&B MED SURG/OB UMMC

## 2022-12-03 PROCEDURE — 85025 COMPLETE CBC W/AUTO DIFF WBC: CPT | Performed by: INTERNAL MEDICINE

## 2022-12-03 PROCEDURE — 250N000013 HC RX MED GY IP 250 OP 250 PS 637

## 2022-12-03 PROCEDURE — 83735 ASSAY OF MAGNESIUM: CPT | Performed by: INTERNAL MEDICINE

## 2022-12-03 PROCEDURE — 250N000013 HC RX MED GY IP 250 OP 250 PS 637: Performed by: NURSE PRACTITIONER

## 2022-12-03 PROCEDURE — 36415 COLL VENOUS BLD VENIPUNCTURE: CPT | Performed by: INTERNAL MEDICINE

## 2022-12-03 PROCEDURE — 84132 ASSAY OF SERUM POTASSIUM: CPT | Performed by: INTERNAL MEDICINE

## 2022-12-03 PROCEDURE — 99232 SBSQ HOSP IP/OBS MODERATE 35: CPT | Performed by: INTERNAL MEDICINE

## 2022-12-03 PROCEDURE — 97535 SELF CARE MNGMENT TRAINING: CPT | Mod: GO

## 2022-12-03 RX ADMIN — PREGABALIN 150 MG: 25 CAPSULE ORAL at 13:24

## 2022-12-03 RX ADMIN — FOLIC ACID 1 MG: 1 TABLET ORAL at 21:57

## 2022-12-03 RX ADMIN — POLYETHYLENE GLYCOL 3350 17 G: 17 POWDER, FOR SOLUTION ORAL at 08:09

## 2022-12-03 RX ADMIN — Medication 1000 UNITS: at 08:08

## 2022-12-03 RX ADMIN — OXYCODONE HYDROCHLORIDE 10 MG: 10 TABLET ORAL at 19:55

## 2022-12-03 RX ADMIN — HYDROXYZINE HYDROCHLORIDE 25 MG: 25 TABLET, FILM COATED ORAL at 03:04

## 2022-12-03 RX ADMIN — ARIPIPRAZOLE 5 MG: 5 TABLET ORAL at 08:07

## 2022-12-03 RX ADMIN — BUSPIRONE HYDROCHLORIDE 30 MG: 10 TABLET ORAL at 08:08

## 2022-12-03 RX ADMIN — OXYCODONE HYDROCHLORIDE 10 MG: 10 TABLET ORAL at 23:07

## 2022-12-03 RX ADMIN — ROPINIROLE HYDROCHLORIDE 0.5 MG: 0.25 TABLET, FILM COATED ORAL at 19:55

## 2022-12-03 RX ADMIN — ACETAMINOPHEN 975 MG: 325 TABLET, FILM COATED ORAL at 03:04

## 2022-12-03 RX ADMIN — SENNOSIDES AND DOCUSATE SODIUM 2 TABLET: 50; 8.6 TABLET ORAL at 08:07

## 2022-12-03 RX ADMIN — OXYCODONE HYDROCHLORIDE 10 MG: 10 TABLET ORAL at 16:40

## 2022-12-03 RX ADMIN — MONTELUKAST 10 MG: 10 TABLET, FILM COATED ORAL at 08:08

## 2022-12-03 RX ADMIN — HYDROXYCHLOROQUINE SULFATE 200 MG: 200 TABLET, FILM COATED ORAL at 08:08

## 2022-12-03 RX ADMIN — SENNOSIDES AND DOCUSATE SODIUM 2 TABLET: 50; 8.6 TABLET ORAL at 19:55

## 2022-12-03 RX ADMIN — PREGABALIN 150 MG: 25 CAPSULE ORAL at 08:07

## 2022-12-03 RX ADMIN — OXYCODONE HYDROCHLORIDE 10 MG: 10 TABLET ORAL at 03:04

## 2022-12-03 RX ADMIN — OXYCODONE HYDROCHLORIDE 10 MG: 10 TABLET ORAL at 10:25

## 2022-12-03 RX ADMIN — HYDROXYCHLOROQUINE SULFATE 200 MG: 200 TABLET, FILM COATED ORAL at 19:55

## 2022-12-03 RX ADMIN — Medication 500 MG: at 08:06

## 2022-12-03 RX ADMIN — DULOXETINE 120 MG: 60 CAPSULE, DELAYED RELEASE ORAL at 19:55

## 2022-12-03 RX ADMIN — OXYCODONE HYDROCHLORIDE 10 MG: 10 TABLET ORAL at 13:25

## 2022-12-03 RX ADMIN — ACETAMINOPHEN 975 MG: 325 TABLET, FILM COATED ORAL at 12:28

## 2022-12-03 RX ADMIN — Medication 125 MCG: at 08:06

## 2022-12-03 RX ADMIN — Medication 250 MG: at 08:07

## 2022-12-03 RX ADMIN — BUSPIRONE HYDROCHLORIDE 30 MG: 10 TABLET ORAL at 19:55

## 2022-12-03 RX ADMIN — OMEPRAZOLE 40 MG: 20 CAPSULE, DELAYED RELEASE ORAL at 21:57

## 2022-12-03 RX ADMIN — METHOCARBAMOL 500 MG: 500 TABLET ORAL at 21:57

## 2022-12-03 ASSESSMENT — ACTIVITIES OF DAILY LIVING (ADL)
ADLS_ACUITY_SCORE: 26
ADLS_ACUITY_SCORE: 25
ADLS_ACUITY_SCORE: 26
ADLS_ACUITY_SCORE: 25
ADLS_ACUITY_SCORE: 25
ADLS_ACUITY_SCORE: 26

## 2022-12-03 NOTE — PROGRESS NOTES
Transfer  Transferred to: 534  Via:bed  Reason for transfer:Pt no longer appropriate for (unit) - improved patient condition  Family: Aware of transfer  Belongings: Packed and sent with pt  Chart: Delivered with pt to next unit  Medications: Meds sent to new unit with pt  Report given to: Philip  Pt status:  Alert oriented. Pain controlled. VSS. CBC recheck in AM.

## 2022-12-03 NOTE — PROGRESS NOTES
Paged, Dr. Garrison to look at discontinuing Ketamine gtt. Per note from Dr. Hernandez (12/2) it was recommended to discontinue yesterday (12/2) or today 12/3.

## 2022-12-03 NOTE — PROGRESS NOTES
ICU End of Shift Summary. See flowsheets for vital signs and detailed assessment.     Changes this shift:  Neuro:  Oriented x4.    Cardiac:  NSR.  BP WNL.   Respiratory: 3L bleed in O2 to CPAP while sleeping,- home setting.  LS clear.    GI/:  No BM this shift, bowel meds given. Voiding okay in BR.  Diet/appetite:  Regular diet, minimal intake thus far.  Activity: Ambulated to the bathroom  Pain:  Back pain.  Scheduled tylenol given, PRN oxycodone, PRN robaxin given with relief.  Ice packs applied.  Ketamine has been running at 5  Skin:  Dressings c/d/I.  No acute changes.  LDA's:  Hemovac x2 on upper back      Plan: PT/OT.  Manage pain.  Monitor spine incisions and drains.  Continue plan of care

## 2022-12-03 NOTE — PLAN OF CARE
Progress Note:  Patient A&O times 4; VS with elevated HR. LS clear and BS+; c/o pain to mid back and repositioned and ambulated for comfort. Sat up in chair during shift and tolerated activity. Very poor appetite and tolerated few bites of dinner. Brace on when up in chair and for ambulation. Back dressing CD&I and drains patent and intact. Continue with patient care.

## 2022-12-03 NOTE — PLAN OF CARE
"VS: /62 (BP Location: Right arm, Patient Position: Semi-Rubin's, Cuff Size: Adult Regular)   Pulse 75   Temp 98.7  F (37.1  C) (Oral)   Resp 18   Ht 1.626 m (5' 4.02\")   Wt 87.5 kg (192 lb 14.4 oz)   LMP  (LMP Unknown)   SpO2 99%   BMI 33.10 kg/m     O2: O2> 95% ORA, denies feeling SOB, lightheadedness  Lungs clear, equal bilateral   Uses 3L w/ CPAP overnight    Output: Voids spontaneously in bathroom   Denies any problems voiding    Last BM: 11/30 per pt report    Activity: Ax1 w/ walker and GB   Pt is able to ambulate in room to bathroom   Slow, steady gait    Up for meals? Pt is able to sit up for meals    Skin: Surgical incision on back,   Blanchable redness on coccyx and on chest     Pain: Reports pain in lower back, managed w/ prn oxycodone   On continuous ketamine drip, 5 mg/hr   CMS: Aox4, denies feeling numbness and tingling but has hx of neuropathy    Dressing: Spine dressings, Aquacel in place, CDI  Mepilex dressing on coccyx, blanchable redness    Diet: Reg, pt reports having  \"no appetite\"    LDA: L Wrist PIV Infusing TKO for ketamine drip   R hand PIV SL  2x hemovacs    Plan: Continue POC for pain management and therapy   RN managed Pot., Phos., Mag.   Additional Info:  Pt has back brace that must be worn when out of bed          "

## 2022-12-03 NOTE — PROGRESS NOTES
Orthopaedic Surgery Progress Note 12/03/2022    S: No acute events overnight. Pain controlled with current regimen. Denies extremity numbness or tingling. Denies chest pain, SOB, nausea/vomiting. Tolerating regular diet. -BM +flatus. Voiding spontaneously. OOB, working with therapy.     O:  Temp: 98.9  F (37.2  C) Temp src: Oral BP: 119/78 Pulse: 89   Resp: 17 SpO2: 98 % O2 Device: Nasal cannula Oxygen Delivery: 3 LPM    Exam:  Gen: No acute distress, resting comfortably in bed.  Resp: Non-labored breathing  CV: RRR  MSK: Back dressings CDI    Lower extremities:  Motor Strength Right Left   Hip flexion: L1, L2, L3 5/5 5/5   Hip adduction: L2, L3 5/5 5/5   Knee flexion: S1 5/5 5/5   Knee extension: L3, L4 5/5 5/5   Ankle dosiflexion: L4, L5 5/5 5/5   EHL: L5 5/5 5/5   Ankle plantarflexion: S1 5/5 5/5     Sensation from L1-S2 is preserved.    Output by Drain (mL) 12/01/22 0700 - 12/01/22 1459 12/01/22 1500 - 12/01/22 2259 12/01/22 2300 - 12/02/22 0659 12/02/22 0700 - 12/02/22 1459 12/02/22 1500 - 12/02/22 2259 12/02/22 2300 - 12/03/22 0659 12/03/22 0700 - 12/03/22 0804   Closed/Suction Drain Midline Back Accordion 10 [place] 170 40 90  140 20    Closed/Suction Drain Inferior;Midline Back Accordion 10 [place] 55 35 20  20 5      Assessment and Plan: Zayra Ramirez is a 52 year old female now s/p PISF T8 - pelvis; TLIF L1 S1 on 11/30 with Dr. Wilhelm.      12/3/2022  Floor status as able   PT/OT, OOB as able  Pain control  Continue drains   XR as able    [place] Primary  Activity: Up with assist until independent. No excessive bending or twisting. No lifting >10 lbs x 6 weeks. Encourage ambulation. If James lift needed, use high back sling.   Weight bearing status: WBAT.  Pain management: Transition from IV to PO as tolerated.    Antibiotics: Ancef x 24 hours - complete  Diet: Begin with clear fluids and progress diet as tolerated. Protein supplements TID.  DVT prophylaxis: SCDs only. No chemical DVT ppx needed at  discharge.  Imaging: XR Upright scoliosis PTDC - ordered.  Labs: PRN  Bracing/Splinting: Marge brace at all times   Dressings: Keep Aquacel c/d/i x 7 days.  Drains: Document output per shift, will be discontinued at Orthopedic Surgery discretion.  Hicks catheter: Removed  Physical Therapy/Occupational Therapy: Eval and treat.  Cultures: Pending, follow culture results closely.    Consults: Medicine.  Follow-up: Clinic with Dr. Wilhelm in 6 weeks with standing scoliosis x-rays needed.   Disposition: Pending progress with therapies, pain control on orals, and medical stability, anticipate discharge on POD #3-5.    Julian Garrison MD PGY4  Orthopaedic Surgery     FOLLOWUP:    Future Appointments   Date Time Provider Department Center   12/1/2022  8:15 AM Licha Pollard, PT [facility] [place]   12/1/2022  1:45 PM Licha Pollard, PT [facility] [place]   12/2/2022  8:15 AM Crys Hong, OT [facility] [place]   1/10/2023 11:45 AM Dior Arroyo PA-C [facility]   1/12/2023 10:40 AM Philip Wilhelm MD [facility]   1/25/2023  1:00 PM [facility]   2/3/2023  1:20 PM Dorothy Hong MD [facility]   2/14/2023  9:55 AM Morro Mijares MD [facility]   3/2/2023  1:00 PM Panchito Saenz MD [facility]   4/25/2023  1:00 PM Danni Dai PA-C [facility] ASHLIE SAEZ

## 2022-12-03 NOTE — OP NOTE
Neurosurgery Operative Note    Date of Service: 11/30/2022    PREOPERATIVE DIAGNOSES:  1. L1-S1 lumbar spondylosis with neurogenic claudication.  2. Sagittal imbalance.  3. T12-L1 autofusion.  4. Obesity  5. History of T11-L1 laminectomies.  6. History of smoking (quit in 11/2021).     POSTOPERATIVE DIAGNOSES:    1. L1-S1 lumbar spondylosis with neurogenic claudication.  2. Sagittal imbalance.  3. T12-L1 autofusion.  4. Obesity  5. History of T11-L1 laminectomies.  6. History of smoking (quit in 11/2021).    PROCEDURE PERFORMED:  1.  Placement of bilateral pedicle screws from T8 to S1.  2.  Erickson-Nixon Osteotomies (Schwab grade 2) at L1-2, L2-3, L3-4, L4-5, L5-S1.  3.  Inferior articular facet osteotomies (Schwab grade 1) at T8-9, T9-10, T10-11, T11-12, T12-L1.  4.  Partial inferior laminectomy at L1, L2, L3, L4 and L5.  5.  Transforaminal Lumbar Interbody Fusion (TLIF) with Titanium-coated Capstone Interbody cages packed with bone morphogenic protein (BMP) at L1-2, L2-3, L3-4, L4-5 and L5-S1.  6.  T8-S1 posterior arthrodesis with bone morphogenic protein (T8-9 only), autograft, Magnifuse allograft and cancellous bone chips.  7.  Placement of stacked pelvic screw fixation with open sacroiliac joint fusion (Bedrock procedure).  8.  Use of intraoperative O-arm/Stealth navigation.  9. Use of intraoperative neuromonitoring (SSEP, MEP, EMG)  10. Use of Cell Saver.    SURGEONS:                     MD Henry Marquez MD  Co-surgery was justified and warranted given case complexity and anticipated difficult surgery.       A 22 modifier is justified for use in this case given significant scarring from the previous surgery, obesity and multilevel spondylosis and sagittal imbalance.  All these factors necessitated significant extra time and effort greater than 25% usual in performing exposure, hardware placement, decompression, and releases (osteotomies and disc  cleanout) in order to mobilize the spine to affect deformity correction.     RESIDENT SURGEONS:  Bucky Garrett MD (Orthopedic Surgery PGY-1).     ANESTHESIA:  General.    ESTIMATED BLOOD LOSS: 1100 mL with 410 mL returned via Cell Saver.     IMPLANTS:    Medtronic Solera screws     T8:  6.5 x 45 mm (L), 5.0 x 40 mm (R)   T9:  5.5 x 45 mm (L), 5.5 x 45 mm (R)  T10: 5.5 x 45 mm (L), 5.5 x 50 mm (R)   T11: 6.5 x 45 mm (L), 6.5 x 40 mm (R) (L= DRMAS)  T12: 6.5 x 50 mm (L), 6.5 x 50 mm (R) (R= DRMAS)  L1:   6.5 x 50 mm (L), 6.5 x 50 mm (R)   L2:   6.5 x 50 mm (L), 5.5 x 50 mm (R)  L3:   6.5 x 50 mm (L), 6.5 x 50 mm (R)    L4:   7.5 x 50 mm (L), 7.5 x 50 mm (R)    L5:   7.5 x 50 mm (L), 7.5 x 50 mm (R)   S1:   7.5 x 40 mm (L), 7.5 x 40 mm (R)        SI Bone Pelvic Screws/SI Joint Fusion ([place])  Upper S2AI: 10.5 x 90  mm  (L), 10.5 x 90   mm (R).   Lower S2AI: 10.5 x 100 mm (L), 10.5 x 100 mm (R).     Medtronic Ti-coatedCapstone   L1-2:  11 mm x   6 deg x 2  L2-3:  11 mm x   6 deg x 2  L3-4:  11 mm x   6 deg x 2  L4-5:  11 mm x   6 deg x 2  L5-S1:10 mm x 12 deg x 2     Two UNiD rods   Two CoCr rods from T11-Pelvis (L) and T12-Pelvis (R)    INDICATIONS:  Zayra Ramirez is a52 year old female with chronic and bilateral R>L radicular leg pain, with neurogenic claudication.  EOS Scoliosis xrays and MRI revealed sagittal malalignment with thoracolumbar junction kyphosis; advanced multilevel lumbar spondylosis with vacuum disc signal, and multilevel stenosis.  Despite nonoperative treatment, continues to have significant disabling symptoms. The above stated procedure was offered in form of multilevel decompression and osteotomies with fusion from T8-sacrum, with bilateral pelvic fixation.  Consented after thorough discussion of the rationale, risks, benefits and alternatives.  Discussed bone graft options; consented to use of Infuse BMP and allograft.      PROCEDURE NOTE:  The patient was marked and consented in the  preoperative area.  She was brought to the operating room where general anesthesia was induced and was intubated.  Arterial line was placed.  A Hicks was placed.  Neuromonitoring leads were placed.  The patient was then rotated prone on a Trios table and her arms were brought forward.  All pressure points were adequately padded.  TXA was given.  She was then prepped and draped in normal sterile fashion.  Time-out was called and the patient's identity, as well as intended procedure. Both Dr. Wilhelm and TRISHA were present during the timeout.        A midline incision was made from T8 to pelvis area.  We then used monopolar cautery to continue the dissection down.  The fascia was opened and we performed a subperiosteal dissection out bilaterally. This was followed out to expose the transverse processes bilaterally. There was significant scar formation at T11-L1 area from the previous surgery. Once we had adequate exposure,  the O-arm was then brought back to the field.  First we obtained an intraoperative O-arm spin from the T8-L1 level.  We then placed the aforementioned screws.   We then obtained a check spin using the O-arm.  All instrumentation appeared to be in a good location.  Neuromonitoring was stable.    We then repeat the O-arm spins to place screws until all the screws were placed from L2 to S1.  Dual-Rasheed Multi-Axial screws were placed on the right T12 and on the left T11.  A check-spine was done to ensure the screw positions were satisfactory.    Bilateral stacked pelvic S2AI screws were placed, one upper screw lateral to the lower screw on each side using SI Bone Granite ingrowth screws for open sacroiliac joint fusion (Bedrock procedure).  Tracks were created using joann awl to the SI joints and drill was used to pass through the joints into the ilium.  We then tapped with 6.5mm and 10.5mm.  Screws were placed suing the [employer] joann .   Another check spin showed adequate placement.        We then  proceed to perform Erickson-Nixon osteotomies (Schwab grade 2) by osteotomizing the inferior and superior articular facets at L1-2, L2-3, L3-4, L4-5 and L5-S1 using an osteotome for segmental release and for posterior bony fusion.  The bones were saved for autograft to be used in the later portion of the procedure.     At this point, we turned our attention towards the transforaminal lumbar interbody fusion at L5-S1.  Interlaminar space was identified and taken down using a rongeur.  We then used TLIF distractors to spread this level.  The ligamentum flavum was entered and removed using Kerrison punches.   L5 nerve roots were exposed.  The disc space was then also identified.  This was entered sharply using a #15 blade.  We had to use disc  sequentially to be able to distract the disc space to insert lordotic interbody cages.  We then performed our discectomy, using an Alysa and upward pointing curettes.  All of the disc material was removed and the endplates were prepared.  We placed turn and rotate and placed the aforementioned Medtronic Capstone lordotic interbody cages (10 mm x 6 deg) packed with autograft and BMP for L5-S1 interbody arthrodesis.  Morsellized autograft was packed between the cages. The O-arm was brought to the field and we confirmed that we had good placement of the interbodies.  Neuromonitoring remained stable.      The same procedure was carried out at L4-5, L3-4, L2-3 and L1-2.  After performing bilateral Erickson-Nixon osteotomies to remove both the inferior and superior articular processes, the discs were removed and endplates were prepared and the above mentioned cages packed with autograft and BMP were inserted for interbody arthrodesis at all these levels.  Xrays were taken to ensure satisfactory position of the cages.     Once the cages were placed, we performed inferior articular facet osteotomies (Schwab grade 1) from T8-9 down to T12-L1 using the osteotome.  Again, the  resected bones were saved for autograft.     We then proceeded to placed the rods on each side.    Two pre-fabricated, patient-specific UNiD rods were used: one was placed on the left side from T8 down to the lower S2AI screw and the second one placed on the right side from T8 to the lower S2AI screw.   The left side janet was placed using the SmartLink towers to facilitate gradual reduction of the ors onto the screw heads.       Two additional accessory rods were cut and fashioned for a 4 janet construct: one from left T11 to the upper S2AI and the second from the right T12 pedicle screw to the upper S2AI screw. The set plugs were used to secured the rods and were finally tightened.  The final images showed a significant correction of the scoliosis.     The wound was irrigated copiously.   As mentioned above, inferior articular facet osteotomies were performed from T8 to L1 to obtain segmental release, but also to facilitate joint decortication and posterior fusion.  The laminas and facets were decorticated from T8 to S1 and we overlaid a BMP (at T8-9 only) as well as cancellous bone chips, Magnifuse and morselized autograft for T8-S1 posterior arthrodesis.      At this point, I scrubbed out the procedure.  Please refer to Dr. Wilhelm's note for further details on the closure of the wound.    Henry Sands M.D.

## 2022-12-03 NOTE — PROGRESS NOTES
[facility]    Medicine Progress Note - Hospitalist Service, GOLD TEAM 19    Date of Admission:  11/30/2022    Assessment & Plan     A: Patient is a 53 y/o woman who has multiple medical problems including hypertension, obstructive sleep apnea, interstitial lung disease, anxiety, depression, borderline personality disorder, PTSD, rheumatoid arthritis, pseudogout and neuropathy, Patient has had past spinal surgeries. Patient underwent Posterior Instrumented Spinal Fusion Thoracic 8 to Sacrum with Bilateral Pelvic Fixation; Transforaminal Lumbar Interbody Fusion with Erickson-Nixon Osteotomy Lumbar 1 to Sacral 1 (5 levels); Possible Cement Augmentation of Screws; Use of Infuse Bone Morphogenetic Protein Large Kit and Allograft on 30-Nov-2022 for flatback syndrome of thoracolumbar region, lumbar spondylosis, spinal stenosis of lumbar region with neurogenic claudication and lumbar radiculopathy.     P:  1.) Flatback syndrome of thoracolumbar region, lumbar spondylosis, spinal stenosis of lumbar region with neurogenic claudication and lumbar radiculopathy: Patient on acetaminophen, hydromorphone, hydroxyzine, methocarbamol and oxycodone as needed.  2.) Acute blood loss anemia: Monitoring labs. Transfusing if hemoglobin falls below 7.  3.) Leucocytosis, likely stress response: Monitoring for evidence of infection.  4.) Restless legs syndrome: Patient on lyrica and requip.  5.) Depression, anxiety, borderline personality disorder PTSD: Patient on cymbalta, abilify and buspar.  6.) Rheumatoid arthritis: Methotrexate on hold. Patient on hydroxychloroquine.  7.) Hypertension: Lisinopril.hydrochlorothiazide on hold for now. Monitoring.  8.) GERD: Patient on PPI.  9.) Diabetes mellitus type II: Liraglutide on hold. Patient on insulin sliding scale.  10.) Constipation: On bowel regimen.  11.) Obstructive sleep apnea: Patient using CPAP.  12.) Interstitial lung disease: Patient on  "albuterol as needed.        Moody Bernstein MD  Hospitalist Service, GOLD TEAM 19  [facility]  Securely message with the Vocera Web Console (learn more here)  Text page via [facility] Paging/Directory   Please see signed in provider for up to date coverage information      Clinically Significant Risk Factors              # Hypoalbuminemia: Lowest albumin = 2.9 g/dL (Ref range: 3.5-5.2) at 12/1/2022  4:26 AM, will monitor as appropriate          # Obesity: Estimated body mass index is 33.1 kg/m  as calculated from the following:    Height as of this encounter: 1.626 m (5' 4.02\").    Weight as of this encounter: 87.5 kg (192 lb 14.4 oz)., PRESENT ON ADMISSION         ______________________________________________________________________    Interval History     Patient noted cough productive of greyish sputum. Patient noted no fever, no chills, no nausea, no vomiting, no dyspnea, no pleurisy and no wheezing. Patient noted pain generally controlled.    Physical Exam   Vital Signs: Temp: 98.9  F (37.2  C) Temp src: Oral BP: 119/78 Pulse: 89   Resp: 17 SpO2: 98 % O2 Device: Nasal cannula Oxygen Delivery: 3 LPM  Weight: 192 lbs 14.44 oz    General: Patient comfortable, NAD.  Heart: RRR, S1 S2 w/o murmurs.  Lungs: Breath sounds present. No crackles/wheezes heard.  Abdomen: Soft, nontender.    Data   Labs noted.   Potassium 3.6; WBC 11.7; Hb 7.6; Platelets 199  "

## 2022-12-04 ENCOUNTER — APPOINTMENT (OUTPATIENT)
Dept: PHYSICAL THERAPY | Facility: CLINIC | Age: 52
DRG: 454 | End: 2022-12-04
Attending: ORTHOPAEDIC SURGERY
Payer: COMMERCIAL

## 2022-12-04 ENCOUNTER — APPOINTMENT (OUTPATIENT)
Dept: OCCUPATIONAL THERAPY | Facility: CLINIC | Age: 52
DRG: 454 | End: 2022-12-04
Attending: ORTHOPAEDIC SURGERY
Payer: COMMERCIAL

## 2022-12-04 LAB
ERYTHROCYTE [DISTWIDTH] IN BLOOD BY AUTOMATED COUNT: 13.4 % (ref 10–15)
GLUCOSE BLDC GLUCOMTR-MCNC: 106 MG/DL (ref 70–99)
GLUCOSE BLDC GLUCOMTR-MCNC: 119 MG/DL (ref 70–99)
GLUCOSE BLDC GLUCOMTR-MCNC: 79 MG/DL (ref 70–99)
GLUCOSE BLDC GLUCOMTR-MCNC: 98 MG/DL (ref 70–99)
HCT VFR BLD AUTO: 22.4 % (ref 35–47)
HGB BLD-MCNC: 7.4 G/DL (ref 11.7–15.7)
MAGNESIUM SERPL-MCNC: 1.9 MG/DL (ref 1.6–2.3)
MCH RBC QN AUTO: 31.6 PG (ref 26.5–33)
MCHC RBC AUTO-ENTMCNC: 33 G/DL (ref 31.5–36.5)
MCV RBC AUTO: 96 FL (ref 78–100)
PHOSPHATE SERPL-MCNC: 2.7 MG/DL (ref 2.5–4.5)
PLATELET # BLD AUTO: 231 10E3/UL (ref 150–450)
POTASSIUM BLD-SCNC: 3.7 MMOL/L (ref 3.4–5.3)
RBC # BLD AUTO: 2.34 10E6/UL (ref 3.8–5.2)
WBC # BLD AUTO: 10.9 10E3/UL (ref 4–11)

## 2022-12-04 PROCEDURE — 120N000002 HC R&B MED SURG/OB UMMC

## 2022-12-04 PROCEDURE — 250N000013 HC RX MED GY IP 250 OP 250 PS 637: Performed by: NURSE PRACTITIONER

## 2022-12-04 PROCEDURE — 97530 THERAPEUTIC ACTIVITIES: CPT | Mod: GP

## 2022-12-04 PROCEDURE — 250N000013 HC RX MED GY IP 250 OP 250 PS 637

## 2022-12-04 PROCEDURE — 83735 ASSAY OF MAGNESIUM: CPT | Performed by: INTERNAL MEDICINE

## 2022-12-04 PROCEDURE — 97535 SELF CARE MNGMENT TRAINING: CPT | Mod: GO

## 2022-12-04 PROCEDURE — 85027 COMPLETE CBC AUTOMATED: CPT | Performed by: INTERNAL MEDICINE

## 2022-12-04 PROCEDURE — 97116 GAIT TRAINING THERAPY: CPT | Mod: GP

## 2022-12-04 PROCEDURE — 250N000011 HC RX IP 250 OP 636: Performed by: NURSE PRACTITIONER

## 2022-12-04 PROCEDURE — 97530 THERAPEUTIC ACTIVITIES: CPT | Mod: GP | Performed by: PHYSICAL THERAPIST

## 2022-12-04 PROCEDURE — 97116 GAIT TRAINING THERAPY: CPT | Mod: GP | Performed by: PHYSICAL THERAPIST

## 2022-12-04 PROCEDURE — 99232 SBSQ HOSP IP/OBS MODERATE 35: CPT | Performed by: INTERNAL MEDICINE

## 2022-12-04 PROCEDURE — 84132 ASSAY OF SERUM POTASSIUM: CPT | Performed by: INTERNAL MEDICINE

## 2022-12-04 PROCEDURE — 36415 COLL VENOUS BLD VENIPUNCTURE: CPT | Performed by: INTERNAL MEDICINE

## 2022-12-04 PROCEDURE — 250N000013 HC RX MED GY IP 250 OP 250 PS 637: Performed by: ORTHOPAEDIC SURGERY

## 2022-12-04 PROCEDURE — 84100 ASSAY OF PHOSPHORUS: CPT | Performed by: INTERNAL MEDICINE

## 2022-12-04 RX ADMIN — BUSPIRONE HYDROCHLORIDE 30 MG: 10 TABLET ORAL at 19:41

## 2022-12-04 RX ADMIN — SENNOSIDES AND DOCUSATE SODIUM 2 TABLET: 50; 8.6 TABLET ORAL at 08:06

## 2022-12-04 RX ADMIN — FOLIC ACID 1 MG: 1 TABLET ORAL at 21:41

## 2022-12-04 RX ADMIN — METHOCARBAMOL 500 MG: 500 TABLET ORAL at 13:37

## 2022-12-04 RX ADMIN — SENNOSIDES AND DOCUSATE SODIUM 2 TABLET: 50; 8.6 TABLET ORAL at 19:42

## 2022-12-04 RX ADMIN — DULOXETINE 120 MG: 60 CAPSULE, DELAYED RELEASE ORAL at 19:42

## 2022-12-04 RX ADMIN — Medication 125 MCG: at 08:07

## 2022-12-04 RX ADMIN — OXYCODONE HYDROCHLORIDE 10 MG: 10 TABLET ORAL at 02:13

## 2022-12-04 RX ADMIN — HYDROXYZINE HYDROCHLORIDE 25 MG: 25 TABLET, FILM COATED ORAL at 18:01

## 2022-12-04 RX ADMIN — ROPINIROLE HYDROCHLORIDE 0.5 MG: 0.25 TABLET, FILM COATED ORAL at 19:42

## 2022-12-04 RX ADMIN — PREGABALIN 150 MG: 25 CAPSULE ORAL at 13:37

## 2022-12-04 RX ADMIN — OXYCODONE HYDROCHLORIDE 10 MG: 10 TABLET ORAL at 18:01

## 2022-12-04 RX ADMIN — HYDROXYCHLOROQUINE SULFATE 200 MG: 200 TABLET, FILM COATED ORAL at 19:42

## 2022-12-04 RX ADMIN — HYDROXYCHLOROQUINE SULFATE 200 MG: 200 TABLET, FILM COATED ORAL at 08:06

## 2022-12-04 RX ADMIN — Medication 500 MG: at 08:06

## 2022-12-04 RX ADMIN — Medication 250 MG: at 08:06

## 2022-12-04 RX ADMIN — Medication 1000 UNITS: at 08:13

## 2022-12-04 RX ADMIN — OXYCODONE HYDROCHLORIDE 10 MG: 10 TABLET ORAL at 12:13

## 2022-12-04 RX ADMIN — ACETAMINOPHEN 650 MG: 325 TABLET, FILM COATED ORAL at 18:01

## 2022-12-04 RX ADMIN — POLYETHYLENE GLYCOL 3350 17 G: 17 POWDER, FOR SOLUTION ORAL at 08:07

## 2022-12-04 RX ADMIN — PREGABALIN 150 MG: 25 CAPSULE ORAL at 08:06

## 2022-12-04 RX ADMIN — OMEPRAZOLE 40 MG: 20 CAPSULE, DELAYED RELEASE ORAL at 21:38

## 2022-12-04 RX ADMIN — ARIPIPRAZOLE 5 MG: 5 TABLET ORAL at 08:06

## 2022-12-04 RX ADMIN — BUSPIRONE HYDROCHLORIDE 30 MG: 10 TABLET ORAL at 08:13

## 2022-12-04 RX ADMIN — HYDROMORPHONE HYDROCHLORIDE 0.4 MG: 0.2 INJECTION, SOLUTION INTRAMUSCULAR; INTRAVENOUS; SUBCUTANEOUS at 21:41

## 2022-12-04 RX ADMIN — OXYCODONE HYDROCHLORIDE 10 MG: 10 TABLET ORAL at 09:03

## 2022-12-04 RX ADMIN — HYDROMORPHONE HYDROCHLORIDE 0.4 MG: 0.2 INJECTION, SOLUTION INTRAMUSCULAR; INTRAVENOUS; SUBCUTANEOUS at 08:01

## 2022-12-04 RX ADMIN — MONTELUKAST 10 MG: 10 TABLET, FILM COATED ORAL at 08:06

## 2022-12-04 ASSESSMENT — ACTIVITIES OF DAILY LIVING (ADL)
ADLS_ACUITY_SCORE: 26

## 2022-12-04 NOTE — PLAN OF CARE
"Goal Outcome Evaluation:  VS: /67 (BP Location: Left arm)   Pulse 86   Temp 98.1  F (36.7  C) (Oral)   Resp 16   Ht 1.626 m (5' 4.02\")   Wt 87.5 kg (192 lb 14.4 oz)   LMP  (LMP Unknown)   SpO2 96%   BMI 33.10 kg/m       O2: SpO2 > 90% and stable on RA. LS clear and equal bilaterally. Denies chest pain and SOB.    Output: Pt tried to urinate X2 but unable to urinate.  and straight caht per protocol for 800 ml.   Last BM: 11/30, denies abdominal discomfort. BS active / passing flatus.    Activity: Up with assist X1, uses walker and gait belt.   Skin: WDL except, back incision and scattered bruises.   Pain: Pain managed with Ketamine infusion, prn oxycodone and robaxin. Ice applied.     CMS: Intact, AOx4. Denies numbness and tingling.   Dressing: Upper/lower back dressing has moist and dried drainage. Dressing reinforced.    Diet: Regular diet. Denies nausea/vomiting.    LDA: PIV  infusing TKO with Ketamine infusion into right forearm.   Equipment: IV pole, personal belongings,    Plan:  Continue with plan of care. Call light within reach, pt able to make needs known.    Additional Info:                              "

## 2022-12-04 NOTE — PROGRESS NOTES
End of Shift Summary. See flowsheets for vital signs and detailed assessment.     Changes this shift:  No major changes this shift, remains A&O X4, vitals stable. Pain managed with contentious Ketamine gtt and PRN orals. No out put on drains, incisions intact.       Plan: Wean off Ketamine gtt, activity as tolerated.           Goal Outcome Evaluation:     Plan of Care Reviewed With: Patient     Overall Patient Progress:  Progressing     Outcome Evaluation:

## 2022-12-04 NOTE — PLAN OF CARE
VS: Temp: 99.8  F (37.7  C) Temp src: Oral BP: 106/65 Pulse: 105   Resp: 16 SpO2: 92 % O2 Device: None (Room air)       O2: On room air, denies cough, denies SOB   Output: Strains to void, needs catheterization. Patient states straining to void pre-existed before surgery and she was to address after surgery. However it has has become much harder to void with back surgery.  mL straight cathed   Last BM: 11/30   Activity: Up with SBA of 1, with walker and gait belt   Skin: Intact with the exception of spinal incision   Pain: Managed by tylenol, robaxin, oxy, dilaudid, atarax. States an 8/10.   CMS: INTACT   Dressing: Aquacel dressing to spine   Diet: REG. Patient is on oral antidiabetics and BG checks   LDA: PIV to left arm, patent, SL   Equipment: IV pump/pole, personal belongings   Plan: TBD   Additional Info:

## 2022-12-04 NOTE — PLAN OF CARE
"VS: /61 (BP Location: Left arm, Patient Position: Supine, Cuff Size: Adult Regular)   Pulse 90   Temp 97.3  F (36.3  C) (Oral)   Resp 16   Ht 1.626 m (5' 4.02\")   Wt 87.5 kg (192 lb 14.4 oz)   LMP  (LMP Unknown)   SpO2 97%   BMI 33.10 kg/m     O2: O2> 95% ORA, denies feeling SOB, lightheadedness  Lungs clear equal bilateral    Uses 3L O2 at night w/ CPAP   Output: Strains to void   Bladder scanned for 485 mL   Straight kasia'd for 450 mL   Deedee colored urine   Last BM: 11/30, denies abdominal pain/discomfort   BS present in all 4 quadrants    Activity: Ax1 w/ walker and GB   Slow steady gait   To wear back brace when OOB   Up for meals? Is able to sit up for meals   Skin: Spinal incision   Scattered bruising    Pain: Reports pan in middle to lower back, managed w/ prn oxy  IV dilaudid given 1x today for breakthrough pain in AM    CMS: AOx4, denies numbness and tingling,   Pt has hx of neuropathy    Dressing: Spinal dressing CDI  2x Hemovac's removed, gauze and Tegaderm placed per providers orders    Diet: Reg, On glucose checks before meals  Denies nausea/vomiting    LDA: R wrist PIV TKO    Plan: Continue POC for pain management    Additional Info:  Ketamine drip stopped this shift  2x Hemovac's removed this shift by author at 1100         "

## 2022-12-04 NOTE — PROGRESS NOTES
[facility]    Medicine Progress Note - Hospitalist Service, GOLD TEAM 16    Date of Admission:  11/30/2022    Assessment & Plan     Zayra Ramirez  is a 53 y/o woman who has multiple medical problems including hypertension, obstructive sleep apnea, interstitial lung disease, anxiety, depression, borderline personality disorder, PTSD, rheumatoid arthritis, pseudogout and neuropathy, Patient has had past spinal surgeries. Patient underwent Posterior Instrumented Spinal Fusion Thoracic 8 to Sacrum with Bilateral Pelvic Fixation; Transforaminal Lumbar Interbody Fusion with Erickson-Nixon Osteotomy Lumbar 1 to Sacral 1 (5 levels); Possible Cement Augmentation of Screws; Use of Infuse Bone Morphogenetic Protein Large Kit and Allograft on 30-Nov-2022 for flatback syndrome of thoracolumbar region, lumbar spondylosis, spinal stenosis of lumbar region with neurogenic claudication and lumbar radiculopathy.       S/P s/p PISF T8 - pelvis; TLIF L1 S, POD#4:  Management by orthopedic team  Patient is currently off ketamine  Patient on acetaminophen, hydromorphone, hydroxyzine, methocarbamol and oxycodone as needed. Acute blood loss anemia: Monitoring labs. Transfusing if hemoglobin falls below 7. Hgb at 7.4 on 12/4     Leucocytosis, likely stress response:   Monitoring for evidence of infection, resolved    Restless legs syndrome:   Patient on lyrica and requip.     Depression, anxiety, borderline personality disorder PTSD:   Patient on cymbalta, abilify and buspar.    Rheumatoid arthritis:   Methotrexate on hold. Patient on hydroxychloroquine.     Hypertension:   Lisinopril.hydrochlorothiazide on hold for now. Monitoring.  BP in the low normal range    GERD:   atient on PPI.    Diabetes mellitus type II, well controlled  Liraglutide on hold. Does not need to be on sliding scale insulin    10.) Constipation: On bowel regimen.  11.) Obstructive sleep apnea: Patient using CPAP.  12.)  "Interstitial lung disease: Patient on albuterol as needed.        Pamella Harley MD  Hospitalist Service, GOLD TEAM [address] [place] [address]  Securely message with the Vocera Web Console (learn more here)  Text page via [facility] Paging/Directory   Please see signed in provider for up to date coverage information      Clinically Significant Risk Factors              # Hypoalbuminemia: Lowest albumin = 2.9 g/dL (Ref range: 3.5-5.2) at 12/1/2022  4:26 AM, will monitor as appropriate          # Obesity: Estimated body mass index is 33.1 kg/m  as calculated from the following:    Height as of this encounter: 1.626 m (5' 4.02\").    Weight as of this encounter: 87.5 kg (192 lb 14.4 oz).          ______________________________________________________________________    Interval History   No acute events overnight   Patient continues to be in some amount of pain, but hopes to get better with therapy   no bowel or bladder issues   Eating and drinking well      Physical Exam   Vital Signs: Temp: 97.3  F (36.3  C) Temp src: Oral BP: 104/61 Pulse: 90   Resp: 16 SpO2: 97 % O2 Device: None (Room air)    Weight: 192 lbs 14.44 oz    General: Patient comfortable, NAD.  Heart: RRR, S1 S2 w/o murmurs.  Lungs: Breath sounds present. No crackles/wheezes heard.  Abdomen: Soft, nontender.    Data   Labs noted.   Potassium 3.6; WBC 11.7; Hb 7.6; Platelets 199  "

## 2022-12-04 NOTE — PROGRESS NOTES
Orthopedic Surgery Progress Note 12/04/2022    S: No acute events overnight. Pain well-controlled. Denies extremity numbness and tingling. Denies chest pain, SOB, and nausea/vomiting. Tolerating a regular diet. Struggles to void at baseline; requiring straight catheterization. Not yet moving bowels. Working with therapy.    O:  Temp: 97.2  F (36.2  C) Temp src: Axillary BP: 103/65 Pulse: 103   Resp: 18 SpO2: 94 % O2 Device: None (Room air) Oxygen Delivery: 3 LPM    Exam:  Gen: No acute distress, resting comfortably in bed.  Resp: Non-labored breathing.  CV: RRR.  MSK: Back dressings CDI.    Lower extremities:  Motor Strength Right Left   Hip flexion: L1, L2, L3 5/5 5/5   Hip adduction: L2, L3 5/5 5/5   Knee flexion: S1 5/5 5/5   Knee extension: L3, L4 5/5 5/5   Ankle dosiflexion: L4, L5 5/5 5/5   EHL: L5 5/5 5/5   Ankle plantarflexion: S1 5/5 5/5     Sensation from L1-S2 is preserved.    Output by Drain (mL) 12/02/22 0700 - 12/02/22 1459 12/02/22 1500 - 12/02/22 2259 12/02/22 2300 - 12/03/22 0659 12/03/22 0700 - 12/03/22 1459 12/03/22 1500 - 12/03/22 2259 12/03/22 2300 - 12/04/22 0659 12/04/22 0700 - 12/04/22 0703   Closed/Suction Drain Midline Back Accordion 10 Mexican  140 20   0    Closed/Suction Drain Inferior;Midline Back Accordion 10 Mexican  20 5   0      Assessment and Plan: Zayra Ramirez is a 52 year old female now s/p PISF T8 - pelvis; TLIF L1-S1 on 11/30 with Dr. Wilhelm.      12/4/2022:  - Discontinue ketamine  - Pain control  - Mobilization and physical therapy  - Remove drains  - XR if able    Choco Primary  Activity: Up with assist until independent. No excessive bending or twisting. No lifting >10 lbs x 6 weeks. Encourage ambulation. If James lift needed, use high back sling.   Weight bearing status: WBAT.  Pain management: Transition from IV to PO as tolerated.    Antibiotics: Ancef x 24 hours - complete  Diet: Begin with clear fluids and progress diet as tolerated. Protein supplements TID.  DVT  prophylaxis: SCDs only. No chemical DVT ppx needed at discharge.  Imaging: XR Upright scoliosis PTDC - ordered.  Labs: PRN  Bracing/Splinting: [place] brace at all times   Dressings: Keep Aquacel c/d/i x 7 days.  Drains: Document output per shift, will be discontinued at Orthopedic Surgery discretion.  Hicks catheter: Removed  Physical Therapy/Occupational Therapy: Eval and treat.  Cultures: Pending, follow culture results closely.    Consults: Medicine.  Follow-up: Clinic with Dr. Wilhelm in 6 weeks with standing scoliosis x-rays needed.   Disposition: Pending progress with therapies, pain control on orals, and medical stability, anticipate discharge on POD #3-5.    Dann Delvalle MD  Orthopedic Surgery PGY4    FOLLOWUP:    Future Appointments   Date Time Provider Department Center   12/1/2022  8:15 AM Licha Pollard, PT [facility]   12/1/2022  1:45 PM Licha Pollard, PT [facility]   12/2/2022  8:15 AM Crys Hong, OT [facility]   1/10/2023 11:45 AM Dior Arroyo PA-C [facility]   1/12/2023 10:40 AM Philip Wilhelm MD [facility]   1/25/2023  1:00 PM [facility]ins [facility]   2/3/2023  1:20 PM Dorothy Hong MD [facility]   2/14/2023  9:55 AM Morro Mijares MD [facility]   3/2/2023  1:00 PM Panchito Saenz MD [facility]    4/25/2023  1:00 PM Danni Dai PAIvethC [facility]

## 2022-12-05 ENCOUNTER — APPOINTMENT (OUTPATIENT)
Dept: GENERAL RADIOLOGY | Facility: CLINIC | Age: 52
DRG: 454 | End: 2022-12-05
Attending: ORTHOPAEDIC SURGERY
Payer: COMMERCIAL

## 2022-12-05 ENCOUNTER — APPOINTMENT (OUTPATIENT)
Dept: PHYSICAL THERAPY | Facility: CLINIC | Age: 52
DRG: 454 | End: 2022-12-05
Attending: ORTHOPAEDIC SURGERY
Payer: COMMERCIAL

## 2022-12-05 LAB
GLUCOSE BLDC GLUCOMTR-MCNC: 105 MG/DL (ref 70–99)
GLUCOSE BLDC GLUCOMTR-MCNC: 113 MG/DL (ref 70–99)
GLUCOSE BLDC GLUCOMTR-MCNC: 114 MG/DL (ref 70–99)
HOLD SPECIMEN: NORMAL
MAGNESIUM SERPL-MCNC: 2.1 MG/DL (ref 1.6–2.3)
PHOSPHATE SERPL-MCNC: 4.2 MG/DL (ref 2.5–4.5)
POTASSIUM BLD-SCNC: 3.9 MMOL/L (ref 3.4–5.3)

## 2022-12-05 PROCEDURE — 97116 GAIT TRAINING THERAPY: CPT | Mod: GP | Performed by: REHABILITATION PRACTITIONER

## 2022-12-05 PROCEDURE — 120N000002 HC R&B MED SURG/OB UMMC

## 2022-12-05 PROCEDURE — 84132 ASSAY OF SERUM POTASSIUM: CPT | Performed by: INTERNAL MEDICINE

## 2022-12-05 PROCEDURE — 250N000013 HC RX MED GY IP 250 OP 250 PS 637

## 2022-12-05 PROCEDURE — 84100 ASSAY OF PHOSPHORUS: CPT | Performed by: INTERNAL MEDICINE

## 2022-12-05 PROCEDURE — 97530 THERAPEUTIC ACTIVITIES: CPT | Mod: GP | Performed by: REHABILITATION PRACTITIONER

## 2022-12-05 PROCEDURE — 250N000013 HC RX MED GY IP 250 OP 250 PS 637: Performed by: ORTHOPAEDIC SURGERY

## 2022-12-05 PROCEDURE — 250N000013 HC RX MED GY IP 250 OP 250 PS 637: Performed by: NURSE PRACTITIONER

## 2022-12-05 PROCEDURE — 83735 ASSAY OF MAGNESIUM: CPT | Performed by: INTERNAL MEDICINE

## 2022-12-05 PROCEDURE — 36415 COLL VENOUS BLD VENIPUNCTURE: CPT | Performed by: INTERNAL MEDICINE

## 2022-12-05 PROCEDURE — 72080 X-RAY EXAM THORACOLMB 2/> VW: CPT

## 2022-12-05 PROCEDURE — 99232 SBSQ HOSP IP/OBS MODERATE 35: CPT | Performed by: INTERNAL MEDICINE

## 2022-12-05 RX ORDER — OXYCODONE HYDROCHLORIDE 5 MG/1
5-10 TABLET ORAL EVERY 4 HOURS PRN
Qty: 30 TABLET | Refills: 0 | Status: SHIPPED | OUTPATIENT
Start: 2022-12-05 | End: 2022-12-06

## 2022-12-05 RX ADMIN — DULOXETINE 120 MG: 60 CAPSULE, DELAYED RELEASE ORAL at 21:23

## 2022-12-05 RX ADMIN — HYDROXYCHLOROQUINE SULFATE 200 MG: 200 TABLET, FILM COATED ORAL at 21:22

## 2022-12-05 RX ADMIN — OXYCODONE HYDROCHLORIDE 10 MG: 10 TABLET ORAL at 21:23

## 2022-12-05 RX ADMIN — HYDROXYCHLOROQUINE SULFATE 200 MG: 200 TABLET, FILM COATED ORAL at 08:16

## 2022-12-05 RX ADMIN — SENNOSIDES AND DOCUSATE SODIUM 2 TABLET: 50; 8.6 TABLET ORAL at 08:13

## 2022-12-05 RX ADMIN — BISACODYL 10 MG: 10 SUPPOSITORY RECTAL at 12:49

## 2022-12-05 RX ADMIN — OMEPRAZOLE 40 MG: 20 CAPSULE, DELAYED RELEASE ORAL at 21:23

## 2022-12-05 RX ADMIN — BUSPIRONE HYDROCHLORIDE 30 MG: 10 TABLET ORAL at 21:22

## 2022-12-05 RX ADMIN — MONTELUKAST 10 MG: 10 TABLET, FILM COATED ORAL at 08:13

## 2022-12-05 RX ADMIN — METHOCARBAMOL 500 MG: 500 TABLET ORAL at 08:14

## 2022-12-05 RX ADMIN — Medication 125 MCG: at 08:16

## 2022-12-05 RX ADMIN — Medication 250 MG: at 08:15

## 2022-12-05 RX ADMIN — Medication 500 MG: at 08:13

## 2022-12-05 RX ADMIN — METHOCARBAMOL 500 MG: 500 TABLET ORAL at 17:43

## 2022-12-05 RX ADMIN — Medication 1000 UNITS: at 08:12

## 2022-12-05 RX ADMIN — POLYETHYLENE GLYCOL 3350 17 G: 17 POWDER, FOR SOLUTION ORAL at 08:14

## 2022-12-05 RX ADMIN — FOLIC ACID 1 MG: 1 TABLET ORAL at 21:23

## 2022-12-05 RX ADMIN — OXYCODONE HYDROCHLORIDE 10 MG: 10 TABLET ORAL at 09:55

## 2022-12-05 RX ADMIN — SENNOSIDES AND DOCUSATE SODIUM 2 TABLET: 50; 8.6 TABLET ORAL at 21:23

## 2022-12-05 RX ADMIN — HYDROXYZINE HYDROCHLORIDE 25 MG: 25 TABLET, FILM COATED ORAL at 17:43

## 2022-12-05 RX ADMIN — PREGABALIN 150 MG: 25 CAPSULE ORAL at 08:13

## 2022-12-05 RX ADMIN — PREGABALIN 150 MG: 25 CAPSULE ORAL at 14:22

## 2022-12-05 RX ADMIN — ARIPIPRAZOLE 5 MG: 5 TABLET ORAL at 08:13

## 2022-12-05 RX ADMIN — BUSPIRONE HYDROCHLORIDE 30 MG: 10 TABLET ORAL at 08:12

## 2022-12-05 RX ADMIN — OXYCODONE HYDROCHLORIDE 5 MG: 5 TABLET ORAL at 14:33

## 2022-12-05 RX ADMIN — ROPINIROLE HYDROCHLORIDE 0.5 MG: 0.25 TABLET, FILM COATED ORAL at 21:22

## 2022-12-05 ASSESSMENT — ACTIVITIES OF DAILY LIVING (ADL)
ADLS_ACUITY_SCORE: 26
ADLS_ACUITY_SCORE: 27
ADLS_ACUITY_SCORE: 27
ADLS_ACUITY_SCORE: 29
ADLS_ACUITY_SCORE: 27

## 2022-12-05 NOTE — PROGRESS NOTES
Temp: 97.1  F (36.2  C) Temp src: Oral BP: 106/57 Pulse: 99   Resp: 16 SpO2: 90 % O2 Device: None (Room air) Oxygen Delivery: 3 LPM  Patient is comfortable, slept for the most part of the night. Presence of voiding difficulty. Patient finally managed to void 400ml  MD was notified and the matter will be addressed this morning. Encourage to void on her own and then cath as a last resort. Pain is managed by oxycodone and IV dilaudid. On CPAP at HS and 3 LPM connected. Patient is pleasant and able to make verbalize needs appropriately. Continue with POC

## 2022-12-05 NOTE — PROGRESS NOTES
[facility]    Medicine Progress Note - Hospitalist Service, GOLD TEAM 16    Date of Admission:  11/30/2022    Assessment & Plan     Zayra Ramirez  is a 51 y/o woman who has multiple medical problems including hypertension, obstructive sleep apnea, interstitial lung disease, anxiety, depression, borderline personality disorder, PTSD, rheumatoid arthritis, pseudogout and neuropathy, Patient has had past spinal surgeries. Patient underwent Posterior Instrumented Spinal Fusion Thoracic 8 to Sacrum with Bilateral Pelvic Fixation; Transforaminal Lumbar Interbody Fusion with Erickson-Nixon Osteotomy Lumbar 1 to Sacral 1 (5 levels); Possible Cement Augmentation of Screws; Use of Infuse Bone Morphogenetic Protein Large Kit and Allograft on 30-Nov-2022 for flatback syndrome of thoracolumbar region, lumbar spondylosis, spinal stenosis of lumbar region with neurogenic claudication and lumbar radiculopathy.       S/P s/p PISF T8 - pelvis; TLIF L1 S, POD#5:  Management by orthopedic team  Patient is currently off ketamine  Patient on acetaminophen, hydromorphone, hydroxyzine, methocarbamol and oxycodone as needed. Acute blood loss anemia: Monitoring labs. Transfusing if hemoglobin falls below 7. Hgb at 7.4 on 12/4     Leucocytosis, likely stress response:   Monitoring for evidence of infection, resolved    Restless legs syndrome:   Patient on lyrica and requip.     Depression, anxiety, borderline personality disorder PTSD:   Patient on cymbalta, abilify and buspar.    Rheumatoid arthritis:   Methotrexate on hold. Patient on hydroxychloroquine.     Hypertension:   Lisinopril.hydrochlorothiazide on hold for now. Monitoring.  BP in the low normal range    GERD:   atient on PPI.    Diabetes mellitus type II, well controlled  Liraglutide on hold. Does not need to be on sliding scale insulin    Urinary Retention:  Patient has had urinary issues needing straight catheterization even prior to  "surgery  PRN straight cath as per unit protocol     10.) Constipation: On bowel regimen.  11.) Obstructive sleep apnea: Patient using CPAP.  12.) Interstitial lung disease: Patient on albuterol as needed.        Pamella Harley MD  Hospitalist Service, GOLD TEAM 16  [facility]  Securely message with the Vocera Web Console (learn more here)  Text page via [facility] Paging/Directory   Please see signed in provider for up to date coverage information      Clinically Significant Risk Factors              # Hypoalbuminemia: Lowest albumin = 2.9 g/dL (Ref range: 3.5-5.2) at 12/1/2022  4:26 AM, will monitor as appropriate          # Obesity: Estimated body mass index is 33.1 kg/m  as calculated from the following:    Height as of this encounter: 1.626 m (5' 4.02\").    Weight as of this encounter: 87.5 kg (192 lb 14.4 oz).          ______________________________________________________________________    Interval History   No acute events overnight   Pain reasonably well controlled   Patient says that she slept well last night   No chest pain or SOB  Eating and drinking well     Physical Exam   Vital Signs: Temp: 97.9  F (36.6  C) Temp src: Oral BP: 101/47 Pulse: 86   Resp: 16 SpO2: 91 % O2 Device: None (Room air) Oxygen Delivery: 3 LPM  Weight: 192 lbs 14.44 oz    General: Patient comfortable, NAD.  Heart: RRR, S1 S2 w/o murmurs.  Lungs: Breath sounds present. No crackles/wheezes heard.  Abdomen: Soft, nontender.    Data   Labs noted.   Potassium 3.6; WBC 11.7; Hb 7.6; Platelets 199  "

## 2022-12-05 NOTE — PROGRESS NOTES
Orthopedic Surgery Progress Note 12/05/2022    S: No acute events overnight. Pain adequately controlled, slightly increased since ketamine turnedo off yesterday. Denies extremity numbness and tingling. Denies chest pain, SOB, and nausea/vomiting. Tolerating a regular diet. Struggles to void at baseline; requiring straight catheterization. Not yet moving bowels but passing gas. Working with therapy.    O:  Temp: 97.1  F (36.2  C) Temp src: Oral BP: 106/57 Pulse: 99   Resp: 16 SpO2: 90 % O2 Device: None (Room air) Oxygen Delivery: 3 LPM    Exam:  Gen: No acute distress, resting comfortably in bed.  Resp: Non-labored breathing.  CV: RRR.  MSK: Back dressings rolled up, taken off, so this was changed.    Lower extremities:  Motor Strength Right Left   Hip flexion: L1, L2, L3 5/5 5/5   Knee flexion: S1 5/5 5/5   Ankle dosiflexion: L4, L5 5/5 5/5   EHL: L5 5/5 5/5   Ankle plantarflexion: S1 5/5 5/5     Sensation from L1-S2 is preserved.    Assessment and Plan: Zayra Ramirez is a 52 year old female now s/p PISF T8 - pelvis; TLIF L1-S1 on 11/30 with Dr. Wilhelm.      12/5/2022:  - Pain control  - Mobilization and physical therapy  - XR to be completed this AM   - possible discharge today when films complete  - recommend holding off on PTA methotrexate until 2 weeks post op for wound healing optimization    Ortho Primary:  Activity: Up with assist until independent. No excessive bending or twisting. No lifting >10 lbs x 6 weeks. Encourage ambulation. If James lift needed, use high back sling.   Weight bearing status: WBAT.  Pain management: Transition from IV to PO as tolerated.    Antibiotics: Ancef x 24 hours - complete  Diet: Begin with clear fluids and progress diet as tolerated. Protein supplements TID.  DVT prophylaxis: SCDs only. No chemical DVT ppx needed at discharge.  Imaging: XR Upright scoliosis PTDC - ordered. Please complete today.  Labs: PRN  Bracing/Splinting: Marge brace at all times   Dressings: Keep  Aquacel c/d/i x 7 days.  Drains: Removed.  Hicks catheter: Removed  Physical Therapy/Occupational Therapy: Eval and treat.  Cultures: Pending, follow culture results closely.    Consults: Medicine.  Follow-up: Clinic with Dr. Wilhelm in 6 weeks with standing scoliosis x-rays needed.   Disposition: Pending progress with therapies, pain control on orals, and medical stability, anticipate discharge on POD #3-5.    Idalmis Benedict MD  Orthopedic Surgery PGY1

## 2022-12-05 NOTE — PROGRESS NOTES
SPIRITUAL HEALTH SERVICES  SPIRITUAL ASSESSMENT Progress Note  [facility] ([facility]) 5 A ORTHO R 0534 12/5/22      REFERRAL SOURCE: Self Referral    Pt declined visit with Unit . She stated she did not have any spiritual needs.    PLAN: No follow up necessary.    Alan Baker MA, MPA  Associate   Pager: [phone]

## 2022-12-05 NOTE — PLAN OF CARE
Alert and oriented x 4, able to make needs and wants known    PIV Right hand saline locked    Surgical dressing to spine CDI - no other visible impairments seen     Able to call appropriately if assistance is needed - walks with gait belt and walker, stand by to x 1 assist    Pain is well managed with current medications on hand    Chronic urinary retention, Unable to void this AM - bladder scan was done at 426ml, 0930AM.     Straight cath done, 10:30 AM post pain pill administration - 500 ml obtained     Bladder scan repeated 14:05 at 215 - per patient, she did not feel any bladder discomfort just yet. Per order, to rescan in 2 hours at 16:05 and intervene accordingly - urinary retention is a chronic problem for the patient.    Patient is due for an xray in her spine today, cleared by P.T. in her ability to stand independently and use a stair step. Transportation ordered 1654     Report given to incoming shift     Discharge disposition pending

## 2022-12-06 ENCOUNTER — APPOINTMENT (OUTPATIENT)
Dept: PHYSICAL THERAPY | Facility: CLINIC | Age: 52
DRG: 454 | End: 2022-12-06
Attending: ORTHOPAEDIC SURGERY
Payer: COMMERCIAL

## 2022-12-06 VITALS
OXYGEN SATURATION: 99 % | SYSTOLIC BLOOD PRESSURE: 99 MMHG | BODY MASS INDEX: 32.93 KG/M2 | DIASTOLIC BLOOD PRESSURE: 55 MMHG | HEART RATE: 99 BPM | TEMPERATURE: 97.8 F | RESPIRATION RATE: 16 BRPM | HEIGHT: 64 IN | WEIGHT: 192.9 LBS

## 2022-12-06 LAB
GLUCOSE BLDC GLUCOMTR-MCNC: 89 MG/DL (ref 70–99)
GLUCOSE BLDC GLUCOMTR-MCNC: 90 MG/DL (ref 70–99)
HOLD SPECIMEN: NORMAL
MAGNESIUM SERPL-MCNC: 2.2 MG/DL (ref 1.6–2.3)
PHOSPHATE SERPL-MCNC: 4.7 MG/DL (ref 2.5–4.5)
POTASSIUM BLD-SCNC: 3.9 MMOL/L (ref 3.4–5.3)

## 2022-12-06 PROCEDURE — 84132 ASSAY OF SERUM POTASSIUM: CPT | Performed by: INTERNAL MEDICINE

## 2022-12-06 PROCEDURE — 250N000013 HC RX MED GY IP 250 OP 250 PS 637

## 2022-12-06 PROCEDURE — 250N000013 HC RX MED GY IP 250 OP 250 PS 637: Performed by: NURSE PRACTITIONER

## 2022-12-06 PROCEDURE — 84100 ASSAY OF PHOSPHORUS: CPT | Performed by: INTERNAL MEDICINE

## 2022-12-06 PROCEDURE — 36415 COLL VENOUS BLD VENIPUNCTURE: CPT | Performed by: INTERNAL MEDICINE

## 2022-12-06 PROCEDURE — 83735 ASSAY OF MAGNESIUM: CPT | Performed by: INTERNAL MEDICINE

## 2022-12-06 PROCEDURE — 99232 SBSQ HOSP IP/OBS MODERATE 35: CPT | Performed by: INTERNAL MEDICINE

## 2022-12-06 PROCEDURE — 250N000013 HC RX MED GY IP 250 OP 250 PS 637: Performed by: ORTHOPAEDIC SURGERY

## 2022-12-06 RX ORDER — AMOXICILLIN 250 MG
1 CAPSULE ORAL 2 TIMES DAILY
Qty: 30 TABLET | Refills: 0 | Status: SHIPPED | OUTPATIENT
Start: 2022-12-06 | End: 2022-12-09

## 2022-12-06 RX ORDER — HYDROXYZINE HYDROCHLORIDE 25 MG/1
25 TABLET, FILM COATED ORAL EVERY 6 HOURS PRN
Qty: 30 TABLET | Refills: 0 | Status: SHIPPED | OUTPATIENT
Start: 2022-12-06 | End: 2022-12-09

## 2022-12-06 RX ORDER — PREGABALIN 150 MG/1
150 CAPSULE ORAL 2 TIMES DAILY
Start: 2022-12-06 | End: 2023-02-27

## 2022-12-06 RX ORDER — ACETAMINOPHEN 325 MG/1
650 TABLET ORAL EVERY 4 HOURS PRN
Qty: 60 TABLET | Refills: 0 | Status: SHIPPED | OUTPATIENT
Start: 2022-12-06 | End: 2022-12-09

## 2022-12-06 RX ORDER — OXYCODONE HYDROCHLORIDE 5 MG/1
5-10 TABLET ORAL EVERY 4 HOURS PRN
Qty: 26 TABLET | Refills: 0 | Status: SHIPPED | OUTPATIENT
Start: 2022-12-06 | End: 2022-12-09

## 2022-12-06 RX ORDER — FOLIC ACID 1 MG/1
1 TABLET ORAL AT BEDTIME
Start: 2022-12-06 | End: 2023-03-02

## 2022-12-06 RX ORDER — POLYETHYLENE GLYCOL 3350 17 G/17G
17 POWDER, FOR SOLUTION ORAL DAILY
Qty: 10 PACKET | Refills: 0 | Status: SHIPPED | OUTPATIENT
Start: 2022-12-07 | End: 2022-12-28

## 2022-12-06 RX ORDER — TIZANIDINE 2 MG/1
2 TABLET ORAL 3 TIMES DAILY PRN
Qty: 40 TABLET | Refills: 0 | Status: SHIPPED | OUTPATIENT
Start: 2022-12-06 | End: 2022-12-09

## 2022-12-06 RX ADMIN — BUSPIRONE HYDROCHLORIDE 30 MG: 10 TABLET ORAL at 08:31

## 2022-12-06 RX ADMIN — PREGABALIN 150 MG: 25 CAPSULE ORAL at 08:30

## 2022-12-06 RX ADMIN — Medication 500 MG: at 08:31

## 2022-12-06 RX ADMIN — Medication 125 MCG: at 08:31

## 2022-12-06 RX ADMIN — HYDROXYCHLOROQUINE SULFATE 200 MG: 200 TABLET, FILM COATED ORAL at 08:31

## 2022-12-06 RX ADMIN — Medication 250 MG: at 08:31

## 2022-12-06 RX ADMIN — ARIPIPRAZOLE 5 MG: 5 TABLET ORAL at 08:31

## 2022-12-06 RX ADMIN — OXYCODONE HYDROCHLORIDE 5 MG: 5 TABLET ORAL at 13:06

## 2022-12-06 RX ADMIN — OXYCODONE HYDROCHLORIDE 10 MG: 10 TABLET ORAL at 02:43

## 2022-12-06 RX ADMIN — Medication 1000 UNITS: at 08:30

## 2022-12-06 RX ADMIN — POLYETHYLENE GLYCOL 3350 17 G: 17 POWDER, FOR SOLUTION ORAL at 08:30

## 2022-12-06 RX ADMIN — PREGABALIN 150 MG: 25 CAPSULE ORAL at 13:06

## 2022-12-06 RX ADMIN — SENNOSIDES AND DOCUSATE SODIUM 2 TABLET: 50; 8.6 TABLET ORAL at 08:31

## 2022-12-06 RX ADMIN — MONTELUKAST 10 MG: 10 TABLET, FILM COATED ORAL at 08:31

## 2022-12-06 ASSESSMENT — ACTIVITIES OF DAILY LIVING (ADL)
ADLS_ACUITY_SCORE: 29

## 2022-12-06 NOTE — DISCHARGE SUMMARY
ORTHOPAEDIC SURGERY DISCHARGE SUMMARY     Date of Admission: 11/30/2022  Date of Discharge: 12/6/2022  2:15 PM  Disposition: Home  Staff Physician: Dr Leobardo Wilhelm MD  Primary Care Provider: Neena Tam    DISCHARGE DIAGNOSIS:  Flatback syndrome of thoracolumbar region [M40.35]  Lumbar spondylosis [M47.816]  Spinal stenosis of lumbar region with neurogenic claudication [M48.062]  Lumbar radiculopathy [M54.16]    PROCEDURES: Procedure(s):  Posterior Instrumented Spinal Fusion Thoracic 8 to Sacrum with Bilateral Pelvic Fixation; Transforaminal Lumbar Interbody Fusion with Erickson-Nixon Osteotomy Lumbar 1 to Sacral 1 (5 levels); Use of Infuse Bone Morphogenetic Protein Large Kit and Allograft on 11/30/2022    BRIEF HISTORY:  This is a 52 year old patient who has been followed in clinic. Please refer to that documentation for full details. Briefly, the patient has a history of flat back syndrome and longstanding back pain. The patient has failed conservative treatment of symptoms and desires more definitive intervention. Therefore, after reviewing non-operative and operative options including the risks and benefits associated with each, the patient elected to proceed with the above stated procedure.     HOSPITAL COURSE:    The patient was admitted following the above listed procedures for pain control and rehabilitation. Zayra Ramirez did well post-operatively. Medicine was consulted post operatively to aid in management of medical co-morbidities. The patient received routine nursing cares and at the time of discharge was medically stable. Vital signs were stable throughout admission. The patient is tolerating a regular diet and is voiding spontaneously. All PT/OT goals have been met for safe mobility. Pain is now controlled on oral medications which will be available on discharge. Stool softeners have been used while taking pain medications to help prevent constipation. Zayra Ramirez is deemed  medically safe to discharge.     Antibiotics:  Ancef given periop and 24 hours postop.  DVT prophylaxis:  Mechanical  PT Progress:  Has met PT/OT goals for safe mobility.    Pain Meds:  Weaned off all IV pain meds by discharge.  Inpatient Events: No significant events or complications.     PHYSICAL EXAM:    Gen: No acute distress, resting comfortably in bed.  Resp: Non-labored breathing.  CV: RRR.  MSK: Aquacel dressing c/d/i     Lower extremities:  Motor Strength Right Left   Hip flexion: L1, L2, L3 5/5 5/5   Knee flexion: S1 5/5 5/5   Ankle dosiflexion: L4, L5 5/5 5/5   EHL: L5 5/5 5/5   Ankle plantarflexion: S1 5/5 5/5      Sensation from L1-S2 is preserved.    FOLLOWUP:    Follow up with Dr. Wilhelm's team at 2 weeks postoperatively.    Future Appointments   Date Time Provider Department Center   1/10/2023 11:45 AM Dior Arroyo PA-C [facility]   1/12/2023 10:40 AM Philip Wilhelm MD [facility]   1/25/2023  1:00 PM [facility]   2/3/2023  1:20 PM [facility]   2/14/2023  9:55 AM Morro Mijares MD [facility]   3/2/2023  1:00 PM Panchito Saenz MD [facility]   4/25/2023  1:00 PM Danni Dai PA-C [facility]     Orthopaedic Surgery appointments are at the [facility] [place] ([address] [place] [id number]). Call [phone] to schedule a follow-up appointment at this location with your provider.     PLANNED DISCHARGE ORDERS:     DVT Prophylaxis: Mechanical     Activity: WBAT      Wound Care: see Below      Discharge Medication List as of 12/6/2022  9:59 AM      START taking these medications    Details   hydrOXYzine (ATARAX) 25 MG tablet Take 1 tablet (25 mg) by mouth every 6 hours as needed for other (adjuvant pain), Disp-30 tablet, R-0, E-Prescribe      polyethylene glycol (MIRALAX) 17 g packet Take 17 g by mouth daily, Disp-10 packet, R-0, E-Prescribe      senna-docusate  (SENOKOT-S/PERICOLACE) 8.6-50 MG tablet Take 1 tablet by mouth 2 times daily, Disp-30 tablet, R-0, E-Prescribe      tiZANidine (ZANAFLEX) 2 MG tablet Take 1 tablet (2 mg) by mouth 3 times daily as needed for muscle spasms, Disp-40 tablet, R-0, E-Prescribe         CONTINUE these medications which have CHANGED    Details   acetaminophen (TYLENOL) 325 MG tablet Take 2 tablets (650 mg) by mouth every 4 hours as needed for other, Disp-60 tablet, R-0, E-Prescribe      folic acid (FOLVITE) 1 MG tablet Take 1 tablet (1 mg) by mouth At Bedtime Skips Sunday nights, No Print Out      oxyCODONE (ROXICODONE) 5 MG tablet Take 1-2 tablets (5-10 mg) by mouth every 4 hours as needed for moderate pain (4-6) or moderate to severe pain, Disp-26 tablet, R-0, E-Prescribe      pregabalin (LYRICA) 150 MG capsule Take 1 capsule (150 mg) by mouth 2 times daily AM and around 1 PM, No Print Out         CONTINUE these medications which have NOT CHANGED    Details   albuterol (PROAIR HFA/PROVENTIL HFA/VENTOLIN HFA) 108 (90 Base) MCG/ACT inhaler Inhale 2 puffs into the lungs every 6 hours as needed for shortness of breath / dyspnea or wheezing Ventolin please, Disp-18 g, R-2, E-Prescribe      albuterol (PROVENTIL) (2.5 MG/3ML) 0.083% neb solution INHALE 3 ML VIA A NEBULIZER 4 TIMES DAILY IF NEEDED., Historical      ARIPiprazole (ABILIFY) 5 MG tablet Take 5 mg by mouth daily, Historical      Black Pepper-Turmeric (TURMERIC CURCUMIN) 5-1000 MG CAPS Take 1 capsule by mouth daily, Historical      busPIRone HCl (BUSPAR) 30 MG tablet Take 30 mg by mouth 2 times daily , Historical      carboxymethylcellulose (CARBOXYMETHYLCELLULOSE SODIUM) 0.5 % SOLN ophthalmic solution Place 1 drop into both eyes 3 times daily as needed for dry eyes, Disp-15 mL, R-11, E-Prescribe      DULoxetine (CYMBALTA) 60 MG capsule Take 120 mg by mouth At Bedtime , Historical      EPINEPHrine (EPIPEN/ADRENACLICK/OR ANY BX GENERIC EQUIV) 0.3 MG/0.3ML injection 2-pack Inject 0.3 mg  into the muscle as needed, Historical      ferrous fumarate 65 mg, [place]. FE,-Vitamin C 125 mg (VITRON C)  MG TABS tablet Take 1 tablet by mouth daily, Historical      hydroxychloroquine (PLAQUENIL) 200 MG tablet Take 1 tablet (200 mg) by mouth 2 times daily, Disp-60 tablet, R-4, E-Prescribe      !! insulin pen needle (32G X 4 MM) 32G X 4 MM miscellaneous Use 1 NEEDLE WEEKLYDisp-12 each, [id number]      !! insulin pen needle (32G X 4 MM) 32G X 4 MM miscellaneous Use 1 NEEDLE daily to use with SaxendaDisp-100 each, [id number]      !! insulin pen needle (32G X 6 MM) 32G X 6 MM miscellaneous Use daily pen needles daily or as directed.Disp-100 each, [id number]      montelukast (SINGULAIR) 10 MG tablet Take 10 mg by mouth daily, Historical      omeprazole (PRILOSEC) 40 MG DR capsule Take 1 capsule (40 mg) by mouth daily, Disp-180 capsule, R-3, E-Prescribe      OXYGEN-HELIUM IN 3L @ night    Oxygen only not helium, Historical      Respiratory Therapy Supplies (CAREUCH UNIVERSL CPAP FILTER) MISC Historical      rOPINIRole (REQUIP) 0.5 MG tablet Take 1 tablet (0.5 mg) by mouth At Bedtime Take 1 tab by mouth at bedtime., Disp-90 tablet, R-1, E-Prescribe      vitamin D3 (CHOLECALCIFEROL) 250 mcg (06338 units) capsule Take 1 capsule by mouth daily, Historical      vitamin E (TOCOPHEROL) 1000 units (450 mg) CAPS capsule Take 1,000 Units by mouth daily, Historical       !! - Potential duplicate medications found. Please discuss with provider.      STOP taking these medications       calcium-magnesium (CALMAG) 500-250 MG TABS per tablet Comments:   Reason for Stopping:         capsaicin (ZOSTRIX) 0.025 % external cream Comments:   Reason for Stopping:         celecoxib (CELEBREX) 200 MG capsule Comments:   Reason for Stopping:         cyclobenzaprine (FLEXERIL) 10 MG tablet Comments:   Reason for Stopping:         liraglutide - Weight Management (SAXENDA) 18 MG/3ML pen Comments:   Reason for Stopping:          lisinopril-hydrochlorothiazide (ZESTORETIC) 20-25 MG tablet Comments:   Reason for Stopping:         methotrexate sodium 2.5 MG TABS Comments:   Reason for Stopping:         potassium 99 MG TABS Comments:   Reason for Stopping:         zinc sulfate (ZINCATE) 220 (50 Zn) MG capsule Comments:   Reason for Stopping:                 Discharge Procedure Orders   Home Care Referral   Referral Priority: Routine: Next available opening Referral Type: Home Health Therapies & Aides   Number of Visits Requested: 1     Reason for your hospital stay   Order Comments: Zayra Ramirez is a 52 year old female now s/p PISF T8 - pelvis; TLIF L1-S1 on 11/30 with Dr. Wilhelm.     Activity   Order Comments: Your activity upon discharge: activity as tolerated    Activity: Up with assist until independent. No excessive bending or twisting. No lifting >10 lbs x 6 weeks. Encourage ambulation. If James lift needed, use high back sling.   Weight bearing status: WBAT.     Order Specific Question Answer Comments   Is discharge order? Yes      Wound care and dressings   Order Comments: Instructions to care for your wound at home: ice to area for comfort, keep wound clean and dry, may get incision wet in shower but do not soak or scrub, reinforce dressing as needed, and remove dressing in 7 days.     Adult [facility] Follow-up and recommended labs and tests   Order Comments: Follow-up: Clinic with Dr. Wilhelm in 6 weeks with standing scoliosis x-rays needed.     Clinic phone number is     Appointments on [place] and/or [facility] (with [facility] or [facility] provider or service). Call [phone] if you haven't heard regarding these appointments within 7 days of discharge.     When to contact your care team   Order Comments: Call Dr Wilhelm if you have any of the following: temperature greater than 101.3  or less than 96.5,  increased shortness of breath, increased drainage, increased swelling, or increased pain.     Adult [facility]  Follow-up and recommended labs and tests   Order Comments: Follow up with Rheumatology regarding timing of resumption of methotrexate.  Follow up with Urology as soon as possible regarding urinary retention.    Appointments on [place] and/or [facility] (with [facility] or [facility] provider or service). Call [phone] if you haven't heard regarding these appointments within 7 days of discharge.     Diet   Order Comments: Follow this diet upon discharge: Orders Placed This Encounter      Snacks/Supplements Adult: Other; Please allow pt/RN to order snacks/supplements PRN; Between Meals      Advance Diet as Tolerated: Regular Diet Adult     Order Specific Question Answer Comments   Is discharge order? Yes      Idalmis Benedict MD  Orthopaedic Surgery, PGY1

## 2022-12-06 NOTE — PLAN OF CARE
"  VS: /55 (BP Location: Left arm)   Pulse 89   Temp 98.2  F (36.8  C)   Resp 16   Ht 1.626 m (5' 4.02\")   Wt 87.5 kg (192 lb 14.4 oz)   LMP  (LMP Unknown)   SpO2 94%   BMI 33.10 kg/m       O2: Room air, denies SOB. LS clear   Output: Voided once this shift, unable to void at bedtime. Straight cathed for 500 ml .    Last BM: 12/5   Activity: Up with assist of 1 with a walker and GB. Wears a Jewette brace when out of bed.   Skin: Back incision    Pain: Pain controlled well with PRN oxycodone, robaxin and atarax   CMS: Alert and oriented x4. Mild Tingling to toes BLE.   Dressing: Back incision dressing C/D/I   Diet: Tolerating regular diet well   LDA: PIV right arm saline locked   Plan: Continue to monitor and follow POC.   Additional Info:  and 114  X-ray done today ,see results.         "

## 2022-12-06 NOTE — PLAN OF CARE
Discharge Planner PT   Patient plan for discharge: home with assist  Current status: Pt pt stating increased back and Raheel glute pain. but stating wanting to get up. pt needing V.c for tech on log roll to maintain spinal precautions. pt needing use of bed rail for supine sit sit but no assist. all mob slow and stability. pt demo STS to WW with SBA. again slow but no assist needed. pt cont to be limited by pain. pt needing assist for cath tubing. pt needing SBA with V.c for tech to returne to supine. time taken to find chair to sit up in later today for increased time OOB. pt needing set and assist to amalia and doff TLSO  PT: pt demo amb with brace at slow but stable pace needing WW for support. pt demo up to 150'x 1 with SBA. pt cont to be limited by pain. pt declined stairs today but did review tech,  able to assist.  Barriers to return to prior living situation: pain weakness and fatigue.   Recommendations for discharge: per PT eval, TCU but pt wanting to return home with assist as needed. Home PT to follow.   Rationale for recommendations: PT pt is progressing well, good support at home. No DME needs. Pt met 5/5 goals       Entered by: Aureliano Lane, PTA 12/06/2022 10:08 AM

## 2022-12-06 NOTE — PROGRESS NOTES
Orthopedic Surgery Progress Note 12/06/2022    S: No acute events overnight. Pain adequately controlled, improved from yesterday morning. Denies extremity numbness and tingling. Denies chest pain, SOB, and nausea/vomiting. Tolerating a regular diet. Struggles to void at baseline; requiring straight catheterization- similar to preop. Working with therapy.    O:  Temp: 98.9  F (37.2  C) Temp src: Axillary BP: 100/55 Pulse: 91   Resp: 18 SpO2: 97 % O2 Device: None (Room air)      Exam:  Gen: No acute distress, resting comfortably in bed.  Resp: Non-labored breathing.  CV: RRR.  MSK: Aquacel dressing c/d/i    Lower extremities:  Motor Strength Right Left   Hip flexion: L1, L2, L3 5/5 5/5   Knee flexion: S1 5/5 5/5   Ankle dosiflexion: L4, L5 5/5 5/5   EHL: L5 5/5 5/5   Ankle plantarflexion: S1 5/5 5/5     Sensation from L1-S2 is preserved.    Assessment and Plan: Zayra Ramirez is a 52 year old female now s/p PISF T8 - pelvis; TLIF L1-S1 on 11/30 with Dr. Wilhelm. Doing well.     12/6/2022:  - Mobilization and physical therapy  - Clear from ortho perspective for discharge today- based on previous OT recs, likely suitable for home with assist   - Recommend holding off on PTA methotrexate until 2 weeks post op for wound healing optimization    Ortho Primary:  Activity: Up with assist until independent. No excessive bending or twisting. No lifting >10 lbs x 6 weeks. Encourage ambulation. If James lift needed, use high back sling.   Weight bearing status: WBAT.  Pain management: Transition from IV to PO as tolerated.    Antibiotics: Ancef x 24 hours - complete  Diet: Begin with clear fluids and progress diet as tolerated. Protein supplements TID.  DVT prophylaxis: SCDs only. No chemical DVT ppx needed at discharge.  Imaging: XR Upright scoliosis PTDC - complete.  Labs: PRN  Bracing/Splinting: Marge brace at all times   Dressings: Keep Aquacel c/d/i x 7 days.  Drains: Removed.  Hicks catheter: Removed  Physical  Therapy/Occupational Therapy: Eval and treat.  Cultures: Pending, follow culture results closely.    Consults: Medicine.  Follow-up: Clinic with Dr. Wilhelm in 6 weeks with standing scoliosis x-rays needed.   Disposition: Pending progress with therapies, pain control on orals, and medical stability, anticipate discharge on POD #5-6.    Idalmis Benedict MD  Orthopedic Surgery PGY1

## 2022-12-06 NOTE — PLAN OF CARE
Patient discharged to home accompanied by a family member via wheelchair   Discharge prescriptions given to patient. All discharge medications and instructions reviewed with patient. Patient instructed to seek care if experiencing worsening symptoms, such as increasing pain not relieved with current medications. Pt went home with an indwelling bond catheter secondary to urinary retention, education was completed and patient was given extra bond catheter supplies. Other phone numbers to call with questions or concerns after discharge reviewed. PIV to right hand removed. Education completed.    Patient verbalized understanding of discharge medications and instructions. Prescribed home medications given to patient. Patient aware to schedule appt with her urologist asap.  Belongings remained with patient.     Patient left unit [id number]

## 2022-12-06 NOTE — PROGRESS NOTES
Occupational Therapy Discharge Summary    Reason for therapy discharge:    Patient's function is adequate for discharge and patient state's she feels ready and declines further therapy this date.     Progress towards therapy goal(s). See goals on Care Plan in [facility] electronic health record for goal details.  Goals partially met.  Barriers to achieving goals:   discharge from facility.    Therapy recommendation(s):    Continued therapy is recommended.  Rationale/Recommendations:  Patient would benefit from home evaluation by HHOT to assess safety and increase IND with ADLs in home environment.

## 2022-12-06 NOTE — PROGRESS NOTES
End of Shift Summary. See flowsheets for vital signs and detailed assessment.     Changes this shift: No acute changes overnight, vitals stable. Pain managed with PRNs, incisions intact.      Plan:  Discharge today.           Goal Outcome Evaluation:     Plan of Care Reviewed With: Patient     Overall Patient Progress: improved     Outcome Evaluation: ready for discharge

## 2022-12-06 NOTE — PROGRESS NOTES
[facility], [place]    Medicine Progress Note - Hospitalist Service, GOLD TEAM 18    Date of Admission:  11/30/2022    Assessment & Plan     A: Patient is a 53 y/o woman who has multiple medical problems including hypertension, obstructive sleep apnea, interstitial lung disease, anxiety, depression, borderline personality disorder, PTSD, rheumatoid arthritis, pseudogout and neuropathy, Patient has had past spinal surgeries. Patient underwent Posterior Instrumented Spinal Fusion Thoracic 8 to Sacrum with Bilateral Pelvic Fixation; Transforaminal Lumbar Interbody Fusion with Erickson-Nixon Osteotomy Lumbar 1 to Sacral 1 (5 levels); Possible Cement Augmentation of Screws; Use of Infuse Bone Morphogenetic Protein Large Kit and Allograft on 30-Nov-2022 for flatback syndrome of thoracolumbar region, lumbar spondylosis, spinal stenosis of lumbar region with neurogenic claudication and lumbar radiculopathy.     Patient has been having urinary retention and has been unable to void since surgery.    P:  1.) Flatback syndrome of thoracolumbar region, lumbar spondylosis, spinal stenosis of lumbar region with neurogenic claudication and lumbar radiculopathy s/p surgical intervention: Patient on acetaminophen, hydromorphone, hydroxyzine, methocarbamol and oxycodone as needed.  2.) Acute blood loss anemia: Monitoring labs. Transfusing if hemoglobin falls below 7.  3.) Leucocytosis, likely stress response: Monitoring for evidence of infection.  4.) Restless legs syndrome: Patient on lyrica and requip.  5.) Depression, anxiety, borderline personality disorder PTSD: Patient on cymbalta, abilify and buspar.  6.) Rheumatoid arthritis: Methotrexate on hold. Patient on hydroxychloroquine. Patient to f/u with Rheumatology as outpatient regarding resumption of methotrexate.   7.) Hypertension: Lisinopril.hydrochlorothiazide on hold for now. Monitoring.  8.) GERD: Patient on PPI.  9.) Diabetes mellitus  "type II: Liraglutide on hold. Patient on insulin sliding scale.  10.) Constipation: On bowel regimen.  11.) Obstructive sleep apnea: Patient using CPAP.  12.) Interstitial lung disease: Patient on albuterol as needed.  13.) Urinary retention: Hicks catheter to be placed. Patient to f/u with Urology as outpatient.      Moody Bernstein MD  Hospitalist Service, [facility] 18  [facility]  Securely message with the Vocera Web Console (learn more here)  Text page via [facility] Paging/Directory   Please see signed in provider for up to date coverage information      Clinically Significant Risk Factors              # Hypoalbuminemia: Lowest albumin = 2.9 g/dL (Ref range: 3.5-5.2) at 12/1/2022  4:26 AM, will monitor as appropriate          # Obesity: Estimated body mass index is 33.1 kg/m  as calculated from the following:    Height as of this encounter: 1.626 m (5' 4.02\").    Weight as of this encounter: 87.5 kg (192 lb 14.4 oz).          ______________________________________________________________________    Interval History     Patient noted having difficulties passing urine since last spring but noted being unable to pass urine since surgery. Patient reported seeing Urology as outpatient.     Patient noted no other problems at this time.    Physical Exam   Vital Signs: Temp: 97.8  F (36.6  C) Temp src: Oral BP: 99/55 Pulse: 99   Resp: 16 SpO2: 99 % O2 Device: None (Room air)    Weight: 192 lbs 14.44 oz    General: Patient comfortable, NAD.  Heart: RRR, S1 S2 w/o murmurs.  Lungs: Breath sounds present. No crackles/wheezes heard.  Abdomen: Soft, nontender.    "

## 2022-12-07 ENCOUNTER — NURSE TRIAGE (OUTPATIENT)
Dept: NURSING | Facility: CLINIC | Age: 52
End: 2022-12-07

## 2022-12-07 ENCOUNTER — PATIENT OUTREACH (OUTPATIENT)
Dept: CARE COORDINATION | Facility: CLINIC | Age: 52
End: 2022-12-07

## 2022-12-07 DIAGNOSIS — E66.812 CLASS 2 SEVERE OBESITY WITH SERIOUS COMORBIDITY AND BODY MASS INDEX (BMI) OF 35.0 TO 35.9 IN ADULT, UNSPECIFIED OBESITY TYPE (H): ICD-10-CM

## 2022-12-07 DIAGNOSIS — E66.01 CLASS 2 SEVERE OBESITY WITH SERIOUS COMORBIDITY AND BODY MASS INDEX (BMI) OF 35.0 TO 35.9 IN ADULT, UNSPECIFIED OBESITY TYPE (H): ICD-10-CM

## 2022-12-07 NOTE — TELEPHONE ENCOUNTER
Spoke to pt. Pt confirmed bodn is in place and was placed on Monday. Assisted to schedule TOV with nurse. No other questions noted.      Charlene Washington, MSN RN

## 2022-12-07 NOTE — TELEPHONE ENCOUNTER
"52 year old s/p Transforaminal lumbar interbody fusion (TLIF) on 11/30/22 with orthopedics   Pt had urinary retention post op and a bond catheter was placed.  Discharge summary states..Follow up with Urology as soon as possible regarding urinary retention.    Sent to urology for review..    Reason for Disposition    Patient wants to be seen    Answer Assessment - Initial Assessment Questions  1. SYMPTOM: \"What's the main symptom you're concerned about?\" (e.g., pain, fever, vomiting)      Urinary retention has bond and needs urology follow-up   2. ONSET: \"When did symptoms  start?\"      After surgery  3. SURGERY: \"What surgery did you have?\"       Transforaminal lumbar interbody fusion (TLIF) via bilateral facetectomies and diskectomies   4. DATE of SURGERY: \"When was the surgery?\"       11/30/22  5. ANESTHESIA: \" What type of anesthesia did you have?\" (e.g., general, spinal, epidural, local)      gen  6. PAIN: \"Is there any pain?\" If Yes, ask: \"How bad is it?\"  (Scale 1-10; or mild, moderate, severe)      controlled  7. FEVER: \"Do you have a fever?\" If Yes, ask: \"What is your temperature, how was it measured, and when did it start?\"      no  8. VOMITING: \"Is there any vomiting?\" If Yes, ask: \"How many times?\"      no  9. BLEEDING: \"Is there any bleeding?\" If Yes, ask: \"How much?\" and \"Where?\"      no  10. OTHER SYMPTOMS: \"Do you have any other symptoms?\" (e.g., drainage from wound, painful urination, constipation)        no    Protocols used: POST-OP SYMPTOMS AND [id number]      "

## 2022-12-08 RX ORDER — LIRAGLUTIDE 6 MG/ML
INJECTION, SOLUTION SUBCUTANEOUS
Qty: 15 ML | Refills: 1 | Status: SHIPPED | OUTPATIENT
Start: 2022-12-08 | End: 2023-02-12

## 2022-12-08 NOTE — TELEPHONE ENCOUNTER
"Routing refill request to provider for review/approval because:  Drug not active on patient's medication list- d/c'd on 12/06/22    Last Written Prescription Date: 11/29/22  Last Fill Quantity: 9mL, # refills: 0  Last office visit provider: ANT 11/29/22    Requested Prescriptions   Pending Prescriptions Disp Refills     SAXENDA 18 MG/3ML pen [Pharmacy Med Name: SAXENDA 18 MG/3 ML PEN]       Sig: INJECT 3 MG SUBCUTANEOUS DAILY       GLP-1 Agonists Protocol Failed - 12/7/2022 12:19 PM        Failed - Order for Saxenda with diagnosis of diabetes        Failed - Medication is active on med list        Passed - HgbA1C in past 3 or 6 months     If HgbA1C is 8 or greater, it needs to be on file within the past 3 months.  If less than 8, must be on file within the past 6 months.     Recent Labs   Lab Test 07/07/22  0846   A1C 5.3             Passed - Patient is age 18 or older        Passed - No active pregnancy on record        Passed - Normal serum creatinine on file in past 12 months     Recent Labs   Lab Test 12/01/22  0426   CR 0.70       Ok to refill medication if creatinine is low          Passed - No positive pregnancy test in past 12 months        Passed - Recent (6 mo) or future (30 days) visit within the authorizing provider's specialty     Patient had office visit in the last 6 months or has a visit in the next 30 days with authorizing provider.  See \"Patient Info\" tab in inbasket, or \"Choose Columns\" in Meds & Orders section of the refill encounter.                 Marlon Mahoney RN 12/08/22 10:43 AM    "

## 2022-12-09 ENCOUNTER — TELEPHONE (OUTPATIENT)
Dept: INTERNAL MEDICINE | Facility: CLINIC | Age: 52
End: 2022-12-09

## 2022-12-09 DIAGNOSIS — Z98.1 STATUS POST THORACIC SPINAL FUSION: ICD-10-CM

## 2022-12-09 RX ORDER — HYDROXYZINE HYDROCHLORIDE 25 MG/1
25 TABLET, FILM COATED ORAL EVERY 6 HOURS PRN
Qty: 30 TABLET | Refills: 2 | Status: SHIPPED | OUTPATIENT
Start: 2022-12-09 | End: 2022-12-28

## 2022-12-09 RX ORDER — OXYCODONE HYDROCHLORIDE 5 MG/1
5-10 TABLET ORAL EVERY 4 HOURS PRN
Qty: 40 TABLET | Refills: 0 | Status: SHIPPED | OUTPATIENT
Start: 2022-12-09 | End: 2022-12-20

## 2022-12-09 RX ORDER — ACETAMINOPHEN 325 MG/1
650 TABLET ORAL EVERY 4 HOURS PRN
Qty: 100 TABLET | Refills: 1 | Status: SHIPPED | OUTPATIENT
Start: 2022-12-09 | End: 2023-01-12

## 2022-12-09 RX ORDER — AMOXICILLIN 250 MG
1 CAPSULE ORAL 2 TIMES DAILY
Qty: 30 TABLET | Refills: 2 | Status: SHIPPED | OUTPATIENT
Start: 2022-12-09 | End: 2022-12-28

## 2022-12-09 RX ORDER — TIZANIDINE 2 MG/1
2 TABLET ORAL 3 TIMES DAILY PRN
Qty: 40 TABLET | Refills: 2 | Status: SHIPPED | OUTPATIENT
Start: 2022-12-09 | End: 2022-12-28

## 2022-12-09 NOTE — TELEPHONE ENCOUNTER
[facility] Call Center    Phone Message    May a detailed message be left on voicemail: yes     Reason for Call: Other: Pt calling for clarification on dressing changes      Action Taken: Other: ortho     Travel Screening: Not Applicable

## 2022-12-09 NOTE — TELEPHONE ENCOUNTER
See phone message from pt with questions about dressing changes.  I called & spoke to both pt & sister who is helping her.    Discharged home not TCU on 12-6-22.   They stated [facility] RN & PT & OT came this week.  Answered all questions about dressing changes & showering & to keep incision covered with clean new gauze dressings after first shower SUN. 12-11-22.    Sister states bloody drainage stopped & no signs of infection. Afeb.   See notes from Urology clinic about F/U appt for bond catheter.    No BM yet but passing gas & taking Senna & Miralax.  Taking fluids well & ambulating hourly.  Discussed high fibr foods & OTC supps or Mag Citrate or enema prn & they agreed.  States need refill of Senna but have lots of Miralax.    Given only #26 Oxycodone so will run out this weekend.  Also taking Tylenol, Atarax & Zanaflex as ordered.    Refilled Oxycodone, Tylenol, Atarax, Zanaflex & Senna.  Call back prn.  They agreed.  S.O./Rosy Hunter RN.

## 2022-12-09 NOTE — TELEPHONE ENCOUNTER
PDMP Review       Value Time User    State PDMP site checked  Yes 12/9/2022 11:40 AM Kim Mcarthur PA-C         See detailed note from Rosy Hunter RN who called and spoke with patient.  Discharged 12/6/22 to home.  Today, sent refills as requested.     Kim Mcarthur PA-C

## 2022-12-09 NOTE — TELEPHONE ENCOUNTER
[facility] Call Center    Phone Message    May a detailed message be left on voicemail: yes     Reason for Call: Other: Melisa calling from [facility] requesting a return call to discuss coordination of care regarding patients recent hospital stay for lumbar spine surgery. Please call thank you.      Action Taken: Message routed to:  [facility]    Travel Screening: Not Applicable

## 2022-12-12 ENCOUNTER — TELEPHONE (OUTPATIENT)
Dept: ORTHOPEDICS | Facility: CLINIC | Age: 52
End: 2022-12-12

## 2022-12-12 NOTE — TELEPHONE ENCOUNTER
See phone message from [facility] RN needing orders postop.  I called RN back.  She states incision healing well but still has 2 small areas draining small amount serous drainage.  Afeb.  No sign sof infection.  States daughter is changing dressing every other day after shower.  [facility] coming out 2X per week.  Faxed wound care orders Letter  to Fax# in message. Call back prn.  She agreed.  S.O./Rosy Hunter RN.

## 2022-12-12 NOTE — TELEPHONE ENCOUNTER
[facility] Call Center    Phone Message    May a detailed message be left on voicemail: yes     Reason for Call: Order(s): Other:   Reason for requested: Wound Care  Date needed: as soon as possible  Provider name: Dr. Chooc Rodriguez from [employer] is requesting Wound Care orders be faxed to her @ [phone].    What type of dressing to use, how to clean, and how often to change?    Action Taken: Message routed to:  [facility] ortho    Travel Screening: Not Applicable

## 2022-12-12 NOTE — LETTER
2022         Patient:  Zayra Ramirez  :   1970  MRN:     [id number]  Physician: KARTHIKEYAN GOLDMAN    Zayra Ramirez Needs dressing changes every other day after showering using  Nonadhearing pad dressings.          Fax to: [phone] Eileen Rodriguez RN [facility]       Electronically signed by Karthikeyan Goldman MD

## 2022-12-13 ENCOUNTER — ALLIED HEALTH/NURSE VISIT (OUTPATIENT)
Dept: UROLOGY | Facility: CLINIC | Age: 52
End: 2022-12-13
Payer: COMMERCIAL

## 2022-12-13 ENCOUNTER — DOCUMENTATION ONLY (OUTPATIENT)
Dept: INTERNAL MEDICINE | Facility: CLINIC | Age: 52
End: 2022-12-13

## 2022-12-13 DIAGNOSIS — R39.11 URINARY HESITANCY: Primary | ICD-10-CM

## 2022-12-13 PROCEDURE — 51702 INSERT TEMP BLADDER CATH: CPT

## 2022-12-13 RX ORDER — CIPROFLOXACIN 500 MG/1
500 TABLET, FILM COATED ORAL ONCE
Status: COMPLETED | OUTPATIENT
Start: 2022-12-13 | End: 2022-12-13

## 2022-12-13 RX ORDER — CIPROFLOXACIN 500 MG/1
500 TABLET, FILM COATED ORAL ONCE
Status: CANCELLED | OUTPATIENT
Start: 2022-12-13 | End: 2022-12-13

## 2022-12-13 RX ADMIN — CIPROFLOXACIN 500 MG: 500 TABLET, FILM COATED ORAL at 16:40

## 2022-12-13 NOTE — PROGRESS NOTES
Chief Complaint   Patient presents with     Urinary Retention     TOV/bond replacement       Patient Active Problem List   Diagnosis     Lumbago     Facial cellulitis     Class 2 severe obesity with serious comorbidity and body mass index (BMI) of 35.0 to 35.9 in adult, unspecified obesity type (H)     Dental abscess     Hypoxia     Subcutaneous emphysema (H)     Borderline personality disorder (H)     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)     Esophageal stricture     Obstruction of esophagus     HTN (hypertension)     Interstitial lung disease (H)     Moderate persistent asthma without complication     Onychomycosis     Restless leg syndrome     Seasonal allergies     Stress     Respiratory bronchiolitis interstitial lung disease (H)     Bilateral shoulder pain     Spinal epidural abscess     Lumbar spondylosis     Inflammatory arthritis     Arthralgia of temporomandibular joint     Articular disc disorder of temporomandibular joint     Derangement of temporomandibular joint     Calculus of gallbladder without cholecystitis without obstruction     Displacement of articular disc of temporomandibular joint with reduction     Myofascial pain     Oral lesion     Symptomatic cholelithiasis     Sacro-iliac pain     Degenerative lumbar spinal stenosis     Chronic lumbar radiculopathy     Glucose intolerance     Chronic neck pain     Lumbar radiculopathy, chronic     Cervical radiculopathy     Acute pain of right shoulder     Other osteoporosis without current pathological fracture     Diaphragmatic hernia     Bipolar 2 disorder (H)     Choking sensation     Gastro-esophageal reflux disease with esophagitis     Limited opening of mandible     History of pelvic surgery     Voiding dysfunction     Pelvic floor dysfunction     Urinary hesitancy     Urinary frequency     Status post thoracic spinal fusion       Allergies   Allergen Reactions     Bee Venom Anaphylaxis     Doxycycline Anaphylaxis     Patient thinks it  may have been just nausea and vomiting, however unable to confirm  Other reaction(s): throat closes     Erythromycin      Other reaction(s): Vomiting       Hydrocodone-Acetaminophen Itching       Current Outpatient Medications   Medication Sig Dispense Refill     acetaminophen (TYLENOL) 325 MG tablet Take 2 tablets (650 mg) by mouth every 4 hours as needed for pain 100 tablet 1     albuterol (PROAIR HFA/PROVENTIL HFA/VENTOLIN HFA) 108 (90 Base) MCG/ACT inhaler Inhale 2 puffs into the lungs every 6 hours as needed for shortness of breath / dyspnea or wheezing Ventolin please 18 g 2     albuterol (PROVENTIL) (2.5 MG/3ML) 0.083% neb solution INHALE 3 ML VIA A NEBULIZER 4 TIMES DAILY IF NEEDED.       ARIPiprazole (ABILIFY) 5 MG tablet Take 5 mg by mouth daily       Black Pepper-Turmeric (TURMERIC CURCUMIN) 5-1000 MG CAPS Take 1 capsule by mouth daily       busPIRone HCl (BUSPAR) 30 MG tablet Take 30 mg by mouth 2 times daily        carboxymethylcellulose (CARBOXYMETHYLCELLULOSE SODIUM) 0.5 % SOLN ophthalmic solution Place 1 drop into both eyes 3 times daily as needed for dry eyes 15 mL 11     DULoxetine (CYMBALTA) 60 MG capsule Take 120 mg by mouth At Bedtime        EPINEPHrine (EPIPEN/ADRENACLICK/OR ANY BX GENERIC EQUIV) 0.3 MG/0.3ML injection 2-pack Inject 0.3 mg into the muscle as needed       ferrous fumarate 65 mg, [place]. FE,-Vitamin C 125 mg (VITRON C)  MG TABS tablet Take 1 tablet by mouth daily       folic acid (FOLVITE) 1 MG tablet Take 1 tablet (1 mg) by mouth At Bedtime Skips Sunday nights       hydroxychloroquine (PLAQUENIL) 200 MG tablet Take 1 tablet (200 mg) by mouth 2 times daily 60 tablet 4     hydrOXYzine (ATARAX) 25 MG tablet Take 1 tablet (25 mg) by mouth every 6 hours as needed for other (adjuvant pain) 30 tablet 2     insulin pen needle (32G X 4 MM) 32G X 4 MM miscellaneous Use 1 NEEDLE WEEKLY 12 each 3     insulin pen needle (32G X 4 MM) 32G X 4 MM miscellaneous Use 1 NEEDLE daily to use with  Saxenda 100 each 3     insulin pen needle (32G X 6 MM) 32G X 6 MM miscellaneous Use daily pen needles daily or as directed. 100 each 2     montelukast (SINGULAIR) 10 MG tablet Take 10 mg by mouth daily       omeprazole (PRILOSEC) 40 MG DR capsule Take 1 capsule (40 mg) by mouth daily 180 capsule 3     oxyCODONE (ROXICODONE) 5 MG tablet Take 1-2 tablets (5-10 mg) by mouth every 4 hours as needed for severe pain (7-10) (wean off as pain improves) 40 tablet 0     OXYGEN-HELIUM IN 3L @ night    Oxygen only not helium       polyethylene glycol (MIRALAX) 17 g packet Take 17 g by mouth daily 10 packet 0     pregabalin (LYRICA) 150 MG capsule Take 1 capsule (150 mg) by mouth 2 times daily AM and around 1 PM       Respiratory Therapy Supplies ([facility] CPAP FILTER) MISC        rOPINIRole (REQUIP) 0.5 MG tablet Take 1 tablet (0.5 mg) by mouth At Bedtime Take 1 tab by mouth at bedtime. 90 tablet 1     SAXENDA 18 MG/3ML pen INJECT 3 MG SUBCUTANEOUS DAILY 15 mL 1     senna-docusate (SENOKOT-S/PERICOLACE) 8.6-50 MG tablet Take 1 tablet by mouth 2 times daily 30 tablet 2     tiZANidine (ZANAFLEX) 2 MG tablet Take 1 tablet (2 mg) by mouth 3 times daily as needed for muscle spasms 40 tablet 2     vitamin D3 (CHOLECALCIFEROL) 250 mcg (70206 units) capsule Take 1 capsule by mouth daily       vitamin E (TOCOPHEROL) 1000 units (450 mg) CAPS capsule Take 1,000 Units by mouth daily         Social History     Tobacco Use     Smoking status: Former     Packs/day: 1.00     Years: 30.00     Pack years: 30.00     Types: Cigarettes     Start date: 1996     Quit date: 2021     Years since quittin.0     Smokeless tobacco: Never   Vaping Use     Vaping Use: Former   Substance Use Topics     Alcohol use: No     Drug use: Yes     Types: Marijuana     Comment: very rarely        Zayra Ramirez comes into clinic today at the request of Dr Suacedo for TOV / catheter removal    Patient diagnosis: Urinary retention    This service  provided today was under the direct supervision of Dr Nugent, who was available if needed.    Zayra Ramirez presented today for a trial of void.  Approximately 350 mL of normal saline instilled into bladder via catheter.  Patient stated she had urge to urinate and catheter was removed without difficulty.  Patient was given a hat to measure urine output.  Patient voided approximately 50 mL of clear urine.  PVR 300ml    Cipro 500 given per protocol: Yes  The following medication was given:     MEDICATION:  Cipro  ROUTE: PO     DOSE: 500mg      Prior to medication administration, verified patient identity using patient's name and date of birth.  Due to medication administration, patient instructed to remain in clinic for 15 minutes  afterwards, and to report any adverse reaction to me immediately.    Drug Amount Wasted:  None.      Patient did tolerate procedure well.    Teaching done with patient verbally as where to call or go if pain, fever, or unable to urinate post catheter removal.    Plan reviewed with Dr Aldana who was in agreement to replace the bond catheter.    Bond catheter replaced due to failed TOV.  16 Divehi latex bond replaced and patient tolerated well.    10ml of normal saline to inflate catheter balloon.      Pt verbalized understanding that she will be contacted with follow up plan from clinic.  Provided patient contact information for the [facility] with Dr Saucedo.    Laurie Rees RN  12/13/2022  4:33 PM

## 2022-12-13 NOTE — PROGRESS NOTES
Type of Form Received: poc    Form Received (Date) 12/13/22   Form Filled out Yes 12/15/22   Placed in provider folder Yes

## 2022-12-13 NOTE — TELEPHONE ENCOUNTER
Melisa Ha is calling back from [facility] with questions regarding coordination of care. She states she isn't sure if the orthopedic clinic giving the orders is the [facility] or an outside agency. She is looking for the contact information. Please call back to discuss.  [phone]

## 2022-12-15 ENCOUNTER — MEDICAL CORRESPONDENCE (OUTPATIENT)
Dept: HEALTH INFORMATION MANAGEMENT | Facility: CLINIC | Age: 52
End: 2022-12-15

## 2022-12-15 PROBLEM — M54.16 CHRONIC LUMBAR RADICULOPATHY: Status: RESOLVED | Noted: 2021-03-18 | Resolved: 2022-12-15

## 2022-12-16 ENCOUNTER — DOCUMENTATION ONLY (OUTPATIENT)
Dept: ORTHOPEDICS | Facility: CLINIC | Age: 52
End: 2022-12-16
Payer: COMMERCIAL

## 2022-12-16 DIAGNOSIS — R26.89 IMPAIRED GAIT AND MOBILITY: Primary | ICD-10-CM

## 2022-12-16 DIAGNOSIS — Z53.9 ERRONEOUS ENCOUNTER--DISREGARD: ICD-10-CM

## 2022-12-20 ENCOUNTER — TELEPHONE (OUTPATIENT)
Dept: ORTHOPEDICS | Facility: CLINIC | Age: 52
End: 2022-12-20

## 2022-12-20 ENCOUNTER — TRANSFERRED RECORDS (OUTPATIENT)
Dept: HEALTH INFORMATION MANAGEMENT | Facility: CLINIC | Age: 52
End: 2022-12-20

## 2022-12-20 ENCOUNTER — DOCUMENTATION ONLY (OUTPATIENT)
Dept: INTERNAL MEDICINE | Facility: CLINIC | Age: 52
End: 2022-12-20

## 2022-12-20 DIAGNOSIS — Z98.1 STATUS POST THORACIC SPINAL FUSION: ICD-10-CM

## 2022-12-20 RX ORDER — OXYCODONE HYDROCHLORIDE 5 MG/1
5 TABLET ORAL EVERY 8 HOURS PRN
Qty: 15 TABLET | Refills: 0 | Status: SHIPPED | OUTPATIENT
Start: 2022-12-20 | End: 2022-12-28

## 2022-12-20 NOTE — PROGRESS NOTES
Type of Form Received: poc    Form Received (Date) 12/20/22   Form Filled out Yes 12/27/22   Placed in provider folder Yes

## 2022-12-20 NOTE — TELEPHONE ENCOUNTER
[facility] Call Center    Phone Message    May a detailed message be left on voicemail: yes     Reason for Call: Medication Refill Request    Has the patient contacted the pharmacy for the refill? Yes   Name of medication being requested: oxyCODONE (ROXICODONE) 5 MG tablet  Provider who prescribed the medication: Dr. Philip Wilhelm  Pharmacy: [facility] [id number] [address]  Date medication is needed: 12/23/22   [Remind patient there is a 72-business hour turn around time on refill requests]      Action Taken: Other: [facility]    Travel Screening: Not Applicable

## 2022-12-22 ENCOUNTER — MEDICAL CORRESPONDENCE (OUTPATIENT)
Dept: HEALTH INFORMATION MANAGEMENT | Facility: CLINIC | Age: 52
End: 2022-12-22

## 2022-12-22 ENCOUNTER — OFFICE VISIT (OUTPATIENT)
Dept: UROLOGY | Facility: CLINIC | Age: 52
End: 2022-12-22
Payer: COMMERCIAL

## 2022-12-22 VITALS
BODY MASS INDEX: 32.78 KG/M2 | HEIGHT: 64 IN | SYSTOLIC BLOOD PRESSURE: 108 MMHG | DIASTOLIC BLOOD PRESSURE: 68 MMHG | OXYGEN SATURATION: 97 % | WEIGHT: 192 LBS | HEART RATE: 96 BPM

## 2022-12-22 DIAGNOSIS — Z98.890 HISTORY OF PELVIC SURGERY: ICD-10-CM

## 2022-12-22 DIAGNOSIS — M62.89 PELVIC FLOOR DYSFUNCTION: ICD-10-CM

## 2022-12-22 DIAGNOSIS — N39.8 VOIDING DYSFUNCTION: ICD-10-CM

## 2022-12-22 DIAGNOSIS — Z79.2 PROPHYLACTIC ANTIBIOTIC: ICD-10-CM

## 2022-12-22 DIAGNOSIS — R33.8 ACUTE URINARY RETENTION: Primary | ICD-10-CM

## 2022-12-22 LAB
ALBUMIN UR-MCNC: NEGATIVE MG/DL
APPEARANCE UR: CLEAR
BILIRUB UR QL STRIP: NEGATIVE
COLOR UR AUTO: YELLOW
GLUCOSE UR STRIP-MCNC: NEGATIVE MG/DL
HGB UR QL STRIP: NEGATIVE
KETONES UR STRIP-MCNC: NEGATIVE MG/DL
LEUKOCYTE ESTERASE UR QL STRIP: ABNORMAL
NITRATE UR QL: NEGATIVE
PH UR STRIP: 6 [PH] (ref 5–7)
SP GR UR STRIP: <=1.005 (ref 1–1.03)
UROBILINOGEN UR STRIP-ACNC: 0.2 E.U./DL

## 2022-12-22 PROCEDURE — 87086 URINE CULTURE/COLONY COUNT: CPT | Performed by: PHYSICIAN ASSISTANT

## 2022-12-22 PROCEDURE — 81003 URINALYSIS AUTO W/O SCOPE: CPT | Mod: QW | Performed by: PHYSICIAN ASSISTANT

## 2022-12-22 PROCEDURE — 99213 OFFICE O/P EST LOW 20 MIN: CPT | Performed by: PHYSICIAN ASSISTANT

## 2022-12-22 RX ORDER — SULFAMETHOXAZOLE/TRIMETHOPRIM 800-160 MG
1 TABLET ORAL ONCE
Qty: 1 TABLET | Refills: 0 | Status: SHIPPED | OUTPATIENT
Start: 2022-12-22 | End: 2022-12-22

## 2022-12-22 ASSESSMENT — PAIN SCALES - GENERAL: PAINLEVEL: NO PAIN (0)

## 2022-12-22 NOTE — PROGRESS NOTES
Urology Clinic      Name: Zayra Ramirez    MRN: [id number]   YOB: 1970  Accompanied at today's visit by:self                 Chief Complaint:   TOV          History of Present Illness:   December 22, 2022    HISTORY:   We have been following 52 year old Zayra Ramirez for history of hysterectomy with cystocele/recotcele repair in 1997, urinary frequency, urinary hesitancy, voiding dysfunction and pelvic floor dysfunction. Last seen by Dr. Saucedo on 10/27/22 for VUDS and Cysto. VUDS showed minimal detrusor function and high EMG activity when instructed to void at end of study suggesive of pelvic floor dysfunction. Cysto was unremarkable. Was referred to PFPT at last encounter. She is s/p back surgery on 11/30/22 and was discharged home with bond. Failed her TOV on 12/13/22; filled to 350mL with PVR of 300mL and bond catheter replaced. Here today for TOV. No recent UTIs. Continues on pain regimen from recent surgery. Bowels regular. Denies any s/s of UTI today or having recent UTIs. Hx of asthma. Patient voices no other concerns at this time.            Allergies:     Allergies   Allergen Reactions     Bee Venom Anaphylaxis     Doxycycline Anaphylaxis     Patient thinks it may have been just nausea and vomiting, however unable to confirm  Other reaction(s): throat closes     Erythromycin      Other reaction(s): Vomiting       Hydrocodone-Acetaminophen Itching            Medications:     Current Outpatient Medications   Medication Sig     acetaminophen (TYLENOL) 325 MG tablet Take 2 tablets (650 mg) by mouth every 4 hours as needed for pain     albuterol (PROAIR HFA/PROVENTIL HFA/VENTOLIN HFA) 108 (90 Base) MCG/ACT inhaler Inhale 2 puffs into the lungs every 6 hours as needed for shortness of breath / dyspnea or wheezing Ventolin please     albuterol (PROVENTIL) (2.5 MG/3ML) 0.083% neb solution INHALE 3 ML VIA A NEBULIZER 4 TIMES DAILY IF NEEDED.     ARIPiprazole (ABILIFY) 5 MG tablet Take 5 mg by mouth  daily     Black Pepper-Turmeric (TURMERIC CURCUMIN) 5-1000 MG CAPS Take 1 capsule by mouth daily     busPIRone HCl (BUSPAR) 30 MG tablet Take 30 mg by mouth 2 times daily      carboxymethylcellulose (CARBOXYMETHYLCELLULOSE SODIUM) 0.5 % SOLN ophthalmic solution Place 1 drop into both eyes 3 times daily as needed for dry eyes     DULoxetine (CYMBALTA) 60 MG capsule Take 120 mg by mouth At Bedtime      EPINEPHrine (EPIPEN/ADRENACLICK/OR ANY BX GENERIC EQUIV) 0.3 MG/0.3ML injection 2-pack Inject 0.3 mg into the muscle as needed     ferrous fumarate 65 mg, [place]. FE,-Vitamin C 125 mg (VITRON C)  MG TABS tablet Take 1 tablet by mouth daily     folic acid (FOLVITE) 1 MG tablet Take 1 tablet (1 mg) by mouth At Bedtime Skips Sunday nights     hydroxychloroquine (PLAQUENIL) 200 MG tablet Take 1 tablet (200 mg) by mouth 2 times daily     hydrOXYzine (ATARAX) 25 MG tablet Take 1 tablet (25 mg) by mouth every 6 hours as needed for other (adjuvant pain)     insulin pen needle (32G X 4 MM) 32G X 4 MM miscellaneous Use 1 NEEDLE WEEKLY     insulin pen needle (32G X 4 MM) 32G X 4 MM miscellaneous Use 1 NEEDLE daily to use with Saxenda     insulin pen needle (32G X 6 MM) 32G X 6 MM miscellaneous Use daily pen needles daily or as directed.     montelukast (SINGULAIR) 10 MG tablet Take 10 mg by mouth daily     omeprazole (PRILOSEC) 40 MG DR capsule Take 1 capsule (40 mg) by mouth daily     oxyCODONE (ROXICODONE) 5 MG tablet Take 1 tablet (5 mg) by mouth every 8 hours as needed for severe pain (7-10) (wean off as pain improves)     OXYGEN-HELIUM IN 3L @ night    Oxygen only not helium     polyethylene glycol (MIRALAX) 17 g packet Take 17 g by mouth daily     pregabalin (LYRICA) 150 MG capsule Take 1 capsule (150 mg) by mouth 2 times daily AM and around 1 PM     Respiratory Therapy Supplies ([facility] CPAP FILTER) MISC      rOPINIRole (REQUIP) 0.5 MG tablet Take 1 tablet (0.5 mg) by mouth At Bedtime Take 1 tab by mouth at  bedtime.     SAXENDA 18 MG/3ML pen INJECT 3 MG SUBCUTANEOUS DAILY     senna-docusate (SENOKOT-S/PERICOLACE) 8.6-50 MG tablet Take 1 tablet by mouth 2 times daily     tiZANidine (ZANAFLEX) 2 MG tablet Take 1 tablet (2 mg) by mouth 3 times daily as needed for muscle spasms     vitamin D3 (CHOLECALCIFEROL) 250 mcg (10625 units) capsule Take 1 capsule by mouth daily     vitamin E (TOCOPHEROL) 1000 units (450 mg) CAPS capsule Take 1,000 Units by mouth daily     Current Facility-Administered Medications   Medication     romosozumab-aqqg (EVENITY) injection 210 mg     Facility-Administered Medications Ordered in Other Visits   Medication     Lidocaine 1 % injection 0.5-5 mL     sodium chloride (PF) 0.9% PF flush 5-50 mL             Past  Surgical History:     Past Surgical History:   Procedure Laterality Date     CERVICAL FUSION       COLONOSCOPY       COLONOSCOPY N/A 02/06/2020    Procedure: COLONOSCOPY, WITH POLYPECTOMY AND BIOPSY;  Surgeon: Julian Mccullough MD;  Location: [facility]     CYSTOSCOPY       ENT SURGERY       ESOPHAGOSCOPY, GASTROSCOPY, DUODENOSCOPY (EGD), COMBINED N/A 02/06/2020    Procedure: ESOPHAGOGASTRODUODENOSCOPY (EGD);  Surgeon: Julian Mccullough MD;  Location: [facility]     EXCISE LESION INTRAORAL Bilateral 10/03/2018    Procedure: EXCISE LESION INTRAORAL;  Wide Local Excision Of of Left Oral Cavity Ulcer;  Surgeon: Morro Mijares MD;  Location: [facility]     HC DRAIN SKIN ABSCESS SIMPLE/SINGLE  03/16/2012    Procedure:INCISION AND DRAINAGE, ABSCESS, SIMPLE; Surgeon:CHRISTIANO HANCOCK; Location: GI     HEAD & NECK SURGERY       HYSTERECTOMY       HYSTERECTOMY       INCISION AND DRAINAGE ABDOMEN WASHOUT, COMBINED       INJECT EPIDURAL CERVICAL N/A 10/14/2021    Procedure: Cervical 7- Thoracic 1 epidural steroid injection with fluoroscopy;  Surgeon: Tram Florian MD;  Location: [facility]     INJECT EPIDURAL LUMBAR Right 09/15/2020    Procedure: Lumbar5- sacral 1 epidural steroid  injection with fluoroscopy;  Surgeon: Tram Florian MD;  [facility]     INJECT EPIDURAL LUMBAR Right 06/29/2021    Procedure: Lumbar 5 sacral 1 epidural steroid injection with fluoroscopy;  Surgeon: Tram Florian MD;  Location: [facility]     INJECT SACROILIAC JOINT Bilateral 06/16/2020    Procedure: Bilateral sacroiliac joint steroid injection with fluoroscopy;  Surgeon: Tram Florian MD;  [facility]     LAMINECTOMY THORACIC ONE LEVEL N/A 08/19/2019    Procedure: LAMINECTOMY, SPINE, THORACIC, 11-12 and Part of Lumbar 1, DRAINAGE OF EPIDURAL ABCESS, Epidural Drain Placement X 2;  Surgeon: Yadiel Beal MD;  Location: [facility]     OPTICAL TRACKING SYSTEM FUSION SPINE POSTERIOR LUMBAR THREE+ LEVELS N/A 11/30/2022    Procedure: Posterior Instrumented Spinal Fusion Thoracic 8 to Sacrum with Bilateral Pelvic Fixation; Transforaminal Lumbar Interbody Fusion with Erickson-Nixon Osteotomy Lumbar 1 to Sacral 1 (5 levels); Use of Infuse Bone Morphogenetic Protein Large Kit and Allograft;  Surgeon: Philip Wilhelm MD;  Location: [facility]     AR PERCUT IMPLNT NEUROELECT,EPIDURAL N/A 08/08/2019    Procedure: TRIAL, SPINAL CORD STIMULATOR WITH BOSTON SCIENTIFIC;  Surgeon: Sipple, Daniel Peter, DO;  [facility]: [facility];  Service: Pain     RADIO FREQUENCY ABLATION / DESTRUCTION OF SACROILOAC JOINT DORSAL PRIMARY RAMUS Bilateral 12/17/2019    Procedure: Bilateral lumbar radiofrequency ablation with fluoroscopy and intravenous sedation ( Lumbar 2,3,4,5 medial branch nerves for the bilateral lumbar3-4, 4-5 and 5-sacral1 joints.;  Surgeon: Tram Florian MD;  [facility]     RADIO FREQUENCY ABLATION / DESTRUCTION OF SACROILOAC JOINT DORSAL PRIMARY RAMUS Right 11/17/2020    Procedure: Right lumbar medial branch nerve radiofrequency ablation right L2,3,4,5 nerves supplying the right L3-4, L4-5 and L5-S1 facet joints;  Surgeon: Tram Florian MD;  Location: [facility] OR     spinal cord stimulator  08/08/2019  "    spinal cord stimulator removal  08/13/2019             Physical Exam:     Vitals:    12/22/22 1029   BP: 108/68   BP Location: Right arm   Patient Position: Sitting   Cuff Size: Adult Regular   Pulse: 96   SpO2: 97%   Weight: 87.1 kg (192 lb)   Height: 1.626 m (5' 4\")     PSYCH: NAD  EYES: EOMI  NEURO: AAO x3    LABS:   Creatinine   Date Value Ref Range Status   12/01/2022 0.70 0.52 - 1.04 mg/dL Final   07/10/2021 1.01 0.52 - 1.04 mg/dL Final     Cysto 10/27/22  Cystoscopy Note: After informed consent was obtained patient was prepped and draped in the standard fashion.  The flexible cystoscope was inserted into a normal appearing urethral meatus.  The urothelium was carefully examined and there were no tumors, masses, stones, foreign bodies, or other urothelial abnormalities noted.  Bilateral ureteral orifices were noted in the normal orthotopic position and both effluxed clear urine.  The cystoscope was retroflexed and the bladder neck was unremarkable.  The urethra was carefully examined upon removing the cystoscope and was unremarkable.  Patient tolerated the procedure without complications noted.      VUDS 10/27/22  -Maximum cystometric capacity 850 mL with normal to slightly delayed filling sensations.  -Good bladder compliance without detrusor overactivity or urodynamic stress incontinence.  -No appreciable detrusor contraction during prolonged attempts to void with some increased EMG activity. Ultimately, she is unable to void any volume in the UDS suite despite multiple position changes, running water, staff leaving the room, resuming filling, and removal of the Pves catheter. As a result, all catheters are removed and she is brought to private restroom where she is able to void for complex uroflowmetry with the following data: voided volume 800 mL, Qmax 29.1 ml/s, Qmean 9.7 ml/s, prolonged and fluctuating flow curve, and final PVR 50 mL.   -Unable to definitively evaluate for bladder outlet obstruction " given inability to void any volume with Pves catheter in place.  -Fluoroscopy reveals a mildly trabeculated bladder wall without diverticuli or VUR. The bladder neck is closed during filling and does not open appreciably during attempts to void under fluoroscopy.          Assessment and Plan:   52 year old is a pleasant female who has acute urinary retention, voiding dysfunction, pelvic floor dysfunction     Plan:  - Failed TOV. Will send urine for culture to ensure not having UTI.   - Continue bowel regimen.  -  Unable to start PFPT due to being in PT currently for back.   - Plan to try TOV in 2 weeks and if fails will have her do monhtly cath changes until patient able to perform CIC; not able to now due to recent back surgery and unable to bend.  - Will obtain a renal US.   - Plan to f/u in 1 month with me.   - Consider possible cysto in 2 months with Dr. Saucedo.   - Advised patient to contact clinic if develops UTI symptoms.   - Continue bowel regimen.   - After discussing the assessment and plan with patient, patient verbalizes understanding and agrees to the above plan. All questions answered.     Other orders as below:  Orders Placed This Encounter   Procedures     US Renal Complete Non-Vascular     23 minutes spent on the date of the encounter doing chart review, review of outside records, review of test results, interpretation of tests, reviewing Cr, reviewing previous imaging, reviewing VUDS, Reviewing previous urology notes from this past year patient visit and documentation.      Danni CLAROS[facility]  Urology  December 22, 2022      Patient Care Team:  Neena Tam APRN CNP as PCP - General (Internal Medicine)  Care, [facility] ([facility] ([facility]), ([place]))  Sumaya Bustillo PA as Physician Assistant (Physician Assistant)  Julian Mccullough MD as MD (Gastroenterology)  Panchito Saenz MD as Assigned Rheumatology Provider  Neena Tam APRN CNP as Assigned  PCP  Leodan Gan MD as Resident (Student in organized health care education/training program)  Morro Mijares MD as Assigned Surgical Provider  Trent Rahman MD as MD (Gastroenterology)  Mick Avalos Jr., MD as Resident (Internal Medicine)  Mai Sotomayor PA-C as Physician Assistant (Pediatric Critical Care Medicine)  Power Ojeda MD as MD (Physical Medicine and Rehabilitation)  Philip Wilhelm MD as Assigned Musculoskeletal Provider  Mikayla Morin, PhD as Assigned Behavioral Health Provider  Trent Rahman MD as Assigned Gastroenterology Provider  Jamaal Dorsey MD as MD (OB/Gyn)  Naya Melendez, PharmD as MTM Pharamcist (Pharmacist)  Yeimy Guerra DO as Assigned Neuroscience Provider  Dior Arroyo PA-C as Assigned Cancer Care Provider  Naya Melendez, PharmD as Assigned MTM Pharmacist

## 2022-12-22 NOTE — NURSING NOTE
Chief Complaint   Patient presents with     Urinary Retention     Possible trial of void   Doreen Bolanos LPN

## 2022-12-22 NOTE — PATIENT INSTRUCTIONS
- followup in 2 weeks for trial to void  - Follow-up with me in 1 month after you see your spine doctor.  - contact our clinic if develop UTI symptoms.  - will have you follow-up with me in 1 month and tentatively in 2 months for cystoscopy  - renal US: Please call [phone] to schedule this at the [facility] ([place]) or [facility] ([place]).    CYSTOSCOPY    What is a Cystoscopy?  This is a procedure done to check for problems inside the bladder.  Problems may include polyps (growths), tumors, inflammation (swelling and redness) and other concerns.    The doctor inserts a thin tube (called a cystoscope) into the bladder.  The tube is about the size of a pencil.  We will give you numbing medicine to reduce the pain or discomfort you may feel.    The tube allows the doctor to:  The doctor will be able to see inside the bladder by filling the bladder with water.  The water makes it easier to see any problems that may be present.    If needed, the doctor may use the tube to:  The doctor is able to take tissue samples (biopsies).  Samples are sent to the lab for testing.  The doctor can also burn off any small growths or tumors that are found.  This is call fulguration.    What happens after the exam?  You may go back to your normal diet and activity as you feel ready, unless your doctor tells you not to.    For the next two days, you may notice:  Some blood in your urine.  Some burning when you urinate (use the toilet).  An urge to urinate more often.  Bladder spasms.    These are normal after the procedure. They should go away on their own after a day or two.      You can help to relieve the above listed symptoms by:  Drinking 6 to 8 large glasses of water each day (includes drinks at meals).  This will help clear the urine.  Take warm baths to relieve pain and bladder spasms.  Do not add anything to the bath water.  Your doctor may prescribe pain medicine.  You may  also take Tylenol (acetaminophen) for pain.    When should I call my doctor?  A fever over 100.0 F (38 C) for more than a day.  (Before you call the doctor, check your temperature under your tongue.)  Chills.  Failure to urinate: No urine comes out when you try to use the toilet.  (Try soaking in a bathtub full of warm water.  If still no urine, call your doctor.)  A lot of blood in the urine or blood clots larger than a nickel.  Pain in the back or abdomen (belly / stomach area).  Pain or spasms that are not relieved by warm tub baths and pain medicine.  Severe pain, burning or other problems while passing urine.  Pain that gets worse after two days.

## 2022-12-22 NOTE — LETTER
12/22/2022       RE: Zayra Ramirez  [address] [place] [address]     Dear Colleague,    Thank you for referring your patient, Zayra Ramirez, to the [facility] at [facility]. Please see a copy of my visit note below.    Urology Clinic      Name: Zayra Ramirez    MRN: [id number]   YOB: 1970  Accompanied at today's visit by:self                 Chief Complaint:   TOV          History of Present Illness:   December 22, 2022    HISTORY:   We have been following 52 year old Zayra Ramirez for history of hysterectomy with cystocele/recotcele repair in 1997, urinary frequency, urinary hesitancy, voiding dysfunction and pelvic floor dysfunction. Last seen by Dr. Saucedo on 10/27/22 for VUDS and Cysto. VUDS showed minimal detrusor function and high EMG activity when instructed to void at end of study suggesive of pelvic floor dysfunction. Cysto was unremarkable. Was referred to PFPT at last encounter. She is s/p back surgery on 11/30/22 and was discharged home with bond. Failed her TOV on 12/13/22; filled to 350mL with PVR of 300mL and bond catheter replaced. Here today for TOV. No recent UTIs. Continues on pain regimen from recent surgery. Bowels regular. Denies any s/s of UTI today or having recent UTIs. Hx of asthma. Patient voices no other concerns at this time.            Allergies:     Allergies   Allergen Reactions     Bee Venom Anaphylaxis     Doxycycline Anaphylaxis     Patient thinks it may have been just nausea and vomiting, however unable to confirm  Other reaction(s): throat closes     Erythromycin      Other reaction(s): Vomiting       Hydrocodone-Acetaminophen Itching            Medications:     Current Outpatient Medications   Medication Sig     acetaminophen (TYLENOL) 325 MG tablet Take 2 tablets (650 mg) by mouth every 4 hours as needed for pain     albuterol (PROAIR HFA/PROVENTIL HFA/VENTOLIN HFA) 108  (90 Base) MCG/ACT inhaler Inhale 2 puffs into the lungs every 6 hours as needed for shortness of breath / dyspnea or wheezing Ventolin please     albuterol (PROVENTIL) (2.5 MG/3ML) 0.083% neb solution INHALE 3 ML VIA A NEBULIZER 4 TIMES DAILY IF NEEDED.     ARIPiprazole (ABILIFY) 5 MG tablet Take 5 mg by mouth daily     Black Pepper-Turmeric (TURMERIC CURCUMIN) 5-1000 MG CAPS Take 1 capsule by mouth daily     busPIRone HCl (BUSPAR) 30 MG tablet Take 30 mg by mouth 2 times daily      carboxymethylcellulose (CARBOXYMETHYLCELLULOSE SODIUM) 0.5 % SOLN ophthalmic solution Place 1 drop into both eyes 3 times daily as needed for dry eyes     DULoxetine (CYMBALTA) 60 MG capsule Take 120 mg by mouth At Bedtime      EPINEPHrine (EPIPEN/ADRENACLICK/OR ANY BX GENERIC EQUIV) 0.3 MG/0.3ML injection 2-pack Inject 0.3 mg into the muscle as needed     ferrous fumarate 65 mg, [place]. FE,-Vitamin C 125 mg (VITRON C)  MG TABS tablet Take 1 tablet by mouth daily     folic acid (FOLVITE) 1 MG tablet Take 1 tablet (1 mg) by mouth At Bedtime Skips Sunday nights     hydroxychloroquine (PLAQUENIL) 200 MG tablet Take 1 tablet (200 mg) by mouth 2 times daily     hydrOXYzine (ATARAX) 25 MG tablet Take 1 tablet (25 mg) by mouth every 6 hours as needed for other (adjuvant pain)     insulin pen needle (32G X 4 MM) 32G X 4 MM miscellaneous Use 1 NEEDLE WEEKLY     insulin pen needle (32G X 4 MM) 32G X 4 MM miscellaneous Use 1 NEEDLE daily to use with Saxenda     insulin pen needle (32G X 6 MM) 32G X 6 MM miscellaneous Use daily pen needles daily or as directed.     montelukast (SINGULAIR) 10 MG tablet Take 10 mg by mouth daily     omeprazole (PRILOSEC) 40 MG DR capsule Take 1 capsule (40 mg) by mouth daily     oxyCODONE (ROXICODONE) 5 MG tablet Take 1 tablet (5 mg) by mouth every 8 hours as needed for severe pain (7-10) (wean off as pain improves)     OXYGEN-HELIUM IN 3L @ night    Oxygen only not helium     polyethylene glycol (MIRALAX) 17 g  packet Take 17 g by mouth daily     pregabalin (LYRICA) 150 MG capsule Take 1 capsule (150 mg) by mouth 2 times daily AM and around 1 PM     Respiratory Therapy Supplies ([facility] CPAP FILTER) MISC      rOPINIRole (REQUIP) 0.5 MG tablet Take 1 tablet (0.5 mg) by mouth At Bedtime Take 1 tab by mouth at bedtime.     SAXENDA 18 MG/3ML pen INJECT 3 MG SUBCUTANEOUS DAILY     senna-docusate (SENOKOT-S/PERICOLACE) 8.6-50 MG tablet Take 1 tablet by mouth 2 times daily     tiZANidine (ZANAFLEX) 2 MG tablet Take 1 tablet (2 mg) by mouth 3 times daily as needed for muscle spasms     vitamin D3 (CHOLECALCIFEROL) 250 mcg (96756 units) capsule Take 1 capsule by mouth daily     vitamin E (TOCOPHEROL) 1000 units (450 mg) CAPS capsule Take 1,000 Units by mouth daily     Current Facility-Administered Medications   Medication     romosozumab-aqqg (EVENITY) injection 210 mg     Facility-Administered Medications Ordered in Other Visits   Medication     Lidocaine 1 % injection 0.5-5 mL     sodium chloride (PF) 0.9% PF flush 5-50 mL             Past  Surgical History:     Past Surgical History:   Procedure Laterality Date     CERVICAL FUSION       COLONOSCOPY       COLONOSCOPY N/A 02/06/2020    Procedure: COLONOSCOPY, WITH POLYPECTOMY AND BIOPSY;  Surgeon: Julian Mccullough MD;  Location:  GI     CYSTOSCOPY       ENT SURGERY       ESOPHAGOSCOPY, GASTROSCOPY, DUODENOSCOPY (EGD), COMBINED N/A 02/06/2020    Procedure: ESOPHAGOGASTRODUODENOSCOPY (EGD);  Surgeon: Julian Mccullough MD;  Location:  GI     EXCISE LESION INTRAORAL Bilateral 10/03/2018    Procedure: EXCISE LESION INTRAORAL;  Wide Local Excision Of of Left Oral Cavity Ulcer;  Surgeon: Morro Mijares MD;  Location: [facility]      DRAIN SKIN ABSCESS SIMPLE/SINGLE  03/16/2012    Procedure:INCISION AND DRAINAGE, ABSCESS, SIMPLE; Surgeon:CHRISTIANO HANCOCK; Location: GI     HEAD & NECK SURGERY       HYSTERECTOMY       HYSTERECTOMY       INCISION AND  DRAINAGE ABDOMEN WASHOUT, COMBINED       INJECT EPIDURAL CERVICAL N/A 10/14/2021    Procedure: Cervical 7- Thoracic 1 epidural steroid injection with fluoroscopy;  Surgeon: Tram Florian MD;  Location: [facility] OR     INJECT EPIDURAL LUMBAR Right 09/15/2020    Procedure: Lumbar5- sacral 1 epidural steroid injection with fluoroscopy;  Surgeon: Tram Florian MD;  [facility]     INJECT EPIDURAL LUMBAR Right 06/29/2021    Procedure: Lumbar 5 sacral 1 epidural steroid injection with fluoroscopy;  Surgeon: Tram Florian MD;  Location: [facility] OR     INJECT SACROILIAC JOINT Bilateral 06/16/2020    Procedure: Bilateral sacroiliac joint steroid injection with fluoroscopy;  Surgeon: Tram Florian MD;  [facility]     LAMINECTOMY THORACIC ONE LEVEL N/A 08/19/2019    Procedure: LAMINECTOMY, SPINE, THORACIC, 11-12 and Part of Lumbar 1, DRAINAGE OF EPIDURAL ABCESS, Epidural Drain Placement X 2;  Surgeon: Yadiel Beal MD;  Location: [facility] OR     OPTICAL TRACKING SYSTEM FUSION SPINE POSTERIOR LUMBAR THREE+ LEVELS N/A 11/30/2022    Procedure: Posterior Instrumented Spinal Fusion Thoracic 8 to Sacrum with Bilateral Pelvic Fixation; Transforaminal Lumbar Interbody Fusion with Erickson-Nixon Osteotomy Lumbar 1 to Sacral 1 (5 levels); Use of Infuse Bone Morphogenetic Protein Large Kit and Allograft;  Surgeon: Philip Wilhelm MD;  Location: [facility]     AL PERCUT IMPLNT NEUROELECT,EPIDURAL N/A 08/08/2019    Procedure: TRIAL, SPINAL CORD STIMULATOR WITH BOSTON SCIENTIFIC;  Surgeon: Sipple, Daniel Peter, DO;  Location: [facility];  Service: Pain     RADIO FREQUENCY ABLATION / DESTRUCTION OF SACROILOAC JOINT DORSAL PRIMARY RAMUS Bilateral 12/17/2019    Procedure: Bilateral lumbar radiofrequency ablation with fluoroscopy and intravenous sedation ( Lumbar 2,3,4,5 medial branch nerves for the bilateral lumbar3-4, 4-5 and 5-sacral1 joints.;  Surgeon: Tram Florian MD;  [facility]     RADIO FREQUENCY ABLATION /  "DESTRUCTION OF SACROILOAC JOINT DORSAL PRIMARY RAMUS Right 11/17/2020    Procedure: Right lumbar medial branch nerve radiofrequency ablation right L2,3,4,5 nerves supplying the right L3-4, L4-5 and L5-S1 facet joints;  Surgeon: Tram Florian MD;  Location: [facility] OR     spinal cord stimulator  08/08/2019     spinal cord stimulator removal  08/13/2019             Physical Exam:     Vitals:    12/22/22 1029   BP: 108/68   BP Location: Right arm   Patient Position: Sitting   Cuff Size: Adult Regular   Pulse: 96   SpO2: 97%   Weight: 87.1 kg (192 lb)   Height: 1.626 m (5' 4\")     PSYCH: NAD  EYES: EOMI  NEURO: AAO x3    LABS:   Creatinine   Date Value Ref Range Status   12/01/2022 0.70 0.52 - 1.04 mg/dL Final   07/10/2021 1.01 0.52 - 1.04 mg/dL Final     Cysto 10/27/22  Cystoscopy Note: After informed consent was obtained patient was prepped and draped in the standard fashion.  The flexible cystoscope was inserted into a normal appearing urethral meatus.  The urothelium was carefully examined and there were no tumors, masses, stones, foreign bodies, or other urothelial abnormalities noted.  Bilateral ureteral orifices were noted in the normal orthotopic position and both effluxed clear urine.  The cystoscope was retroflexed and the bladder neck was unremarkable.  The urethra was carefully examined upon removing the cystoscope and was unremarkable.  Patient tolerated the procedure without complications noted.      VUDS 10/27/22  -Maximum cystometric capacity 850 mL with normal to slightly delayed filling sensations.  -Good bladder compliance without detrusor overactivity or urodynamic stress incontinence.  -No appreciable detrusor contraction during prolonged attempts to void with some increased EMG activity. Ultimately, she is unable to void any volume in the UDS suite despite multiple position changes, running water, staff leaving the room, resuming filling, and removal of the Pves catheter. As a result, all catheters " are removed and she is brought to private restroom where she is able to void for complex uroflowmetry with the following data: voided volume 800 mL, Qmax 29.1 ml/s, Qmean 9.7 ml/s, prolonged and fluctuating flow curve, and final PVR 50 mL.   -Unable to definitively evaluate for bladder outlet obstruction given inability to void any volume with Pves catheter in place.  -Fluoroscopy reveals a mildly trabeculated bladder wall without diverticuli or VUR. The bladder neck is closed during filling and does not open appreciably during attempts to void under fluoroscopy.          Assessment and Plan:   52 year old is a pleasant female who has acute urinary retention, voiding dysfunction, pelvic floor dysfunction     Plan:  - Failed TOV. Will send urine for culture to ensure not having UTI.   - Continue bowel regimen.  -  Unable to start PFPT due to being in PT currently for back.   - Plan to try TOV in 2 weeks and if fails will have her do monhtly cath changes until patient able to perform CIC; not able to now due to recent back surgery and unable to bend.  - Will obtain a renal US.   - Plan to f/u in 1 month with me.   - Consider possible cysto in 2 months with Dr. Saucedo.   - Advised patient to contact clinic if develops UTI symptoms.   - Continue bowel regimen.   - After discussing the assessment and plan with patient, patient verbalizes understanding and agrees to the above plan. All questions answered.     Other orders as below:  Orders Placed This Encounter   Procedures     US Renal Complete Non-Vascular     23 minutes spent on the date of the encounter doing chart review, review of outside records, review of test results, interpretation of tests, reviewing Cr, reviewing previous imaging, reviewing VUDS, Reviewing previous urology notes from this past year patient visit and documentation.      Danni Dai PA-C  Urology  December 22, 2022      Patient Care Team:  Neena Tam APRN CNP as PCP - General  (Internal Medicine)  [facility] ([facility] HEALTH AGENC[facility] ([facility]), ([place]))  Sumaya Bustillo PA as Physician Assistant (Physician Assistant)  Julian Mccullough MD as MD (Gastroenterology)  Panchito Saenz MD as Assigned Rheumatology Provider  Neena Tam APRN CNP as Assigned PCP  Leodan Gan MD as Resident (Student in organized health care education/training program)  Morro Mijares MD as Assigned Surgical Provider  Trent Rahman MD as MD (Gastroenterology)  Mick Avalos Jr., MD as Resident (Internal Medicine)  Mai Sotomayor PA-C as Physician Assistant (Pediatric Critical Care Medicine)  Power Ojeda MD as MD (Physical Medicine and Rehabilitation)  Philip Wilhelm MD as Assigned Musculoskeletal Provider  Mikayla Morin, PhD as Assigned Behavioral Health Provider  Trent Rahman MD as Assigned Gastroenterology Provider  Jamaal Dorsey MD as MD (OB/Gyn)  Naya Melendez, PharmD as MTM Pharamcist (Pharmacist)  Yeimy Guerra DO as Assigned Neuroscience Provider  Dior Arroyo PA-C as Assigned Cancer Care Provider  Naya Melendez, PharmD as Assigned MTM Pharmacist

## 2022-12-23 ENCOUNTER — DOCUMENTATION ONLY (OUTPATIENT)
Dept: ORTHOPEDICS | Facility: CLINIC | Age: 52
End: 2022-12-23

## 2022-12-23 NOTE — PROGRESS NOTES
Received Completed forms Yes   Faxed Forms Faxed To: [facility] equipment   Fax Number: [phone]   Sent to HIM (Date) 12/22/22

## 2022-12-24 LAB — BACTERIA UR CULT: NO GROWTH

## 2022-12-26 DIAGNOSIS — Z98.1 STATUS POST THORACIC SPINAL FUSION: Primary | ICD-10-CM

## 2022-12-26 DIAGNOSIS — M47.816 LUMBAR SPONDYLOSIS: ICD-10-CM

## 2022-12-28 DIAGNOSIS — Z98.1 STATUS POST THORACIC SPINAL FUSION: ICD-10-CM

## 2022-12-28 RX ORDER — HYDROXYZINE HYDROCHLORIDE 25 MG/1
25 TABLET, FILM COATED ORAL EVERY 6 HOURS PRN
Qty: 30 TABLET | Refills: 2 | Status: SHIPPED | OUTPATIENT
Start: 2022-12-28 | End: 2023-01-12

## 2022-12-28 RX ORDER — POLYETHYLENE GLYCOL 3350 17 G/17G
17 POWDER, FOR SOLUTION ORAL DAILY
Qty: 10 PACKET | Refills: 1 | Status: SHIPPED | OUTPATIENT
Start: 2022-12-28 | End: 2023-12-22

## 2022-12-28 RX ORDER — OXYCODONE HYDROCHLORIDE 5 MG/1
5 TABLET ORAL EVERY 12 HOURS PRN
Qty: 14 TABLET | Refills: 0 | Status: SHIPPED | OUTPATIENT
Start: 2022-12-28 | End: 2023-07-05

## 2022-12-28 RX ORDER — AMOXICILLIN 250 MG
1 CAPSULE ORAL 2 TIMES DAILY
Qty: 30 TABLET | Refills: 2 | Status: SHIPPED | OUTPATIENT
Start: 2022-12-28 | End: 2023-12-22

## 2022-12-28 RX ORDER — TIZANIDINE 2 MG/1
2 TABLET ORAL 3 TIMES DAILY PRN
Qty: 40 TABLET | Refills: 2 | Status: SHIPPED | OUTPATIENT
Start: 2022-12-28 | End: 2023-01-12

## 2022-12-28 NOTE — TELEPHONE ENCOUNTER
See phone message for refills.  Last refill was Oxycodone 1 Q8H so 3 per day.  I called pt back. Rates pain 7 & states tightened brace which helped pain slightly.  takes Oxycodone 1 at HS & 1 during night to sleep & 1 during day.    Also taking Tylenol & Atarax & Zanaflex.  I told pt Tylenol already has a refill on it.  Having Bms & taking Senna & Miralax & needs refills.    I told pt this Oxycodone refill will decrease to Max 2 per day & continue to wean off & continue using Ice & she agreed.    Refilled Oxycodone Max 2 per day & Atarax & Zanaflex & Senna & Miralax.  RTC POP appt. 1-12-23.  Call back prn. Pt agreed.    S.O./Rosy Hunter RN.,

## 2022-12-28 NOTE — TELEPHONE ENCOUNTER
[facility] Call Center    Phone Message    May a detailed message be left on voicemail: yes     Reason for Call: Medication Refill Request    Has the patient contacted the pharmacy for the refill? Yes   Name of medication being requested: oxyCODONE (ROXICODONE) 5 MG tablet, hydrOXYzine (ATARAX) 25 MG tablet, tiZANidine (ZANAFLEX) 2 MG tablet, & acetaminophen (TYLENOL) 325 MG tablet  Provider who prescribed the medication: Dr. Wilhelm  Pharmacy: [address]  Date medication is needed: 72 business hours ok. Still has from 9-18 tablets for each one   [Remind patient there is a 72-business hour turn around time on refill requests]      Action Taken: Other: [facility]    Travel Screening: Not Applicable

## 2022-12-30 ENCOUNTER — HOSPITAL ENCOUNTER (OUTPATIENT)
Dept: ULTRASOUND IMAGING | Facility: CLINIC | Age: 52
Discharge: HOME | End: 2022-12-30
Attending: PHYSICIAN ASSISTANT | Admitting: PHYSICIAN ASSISTANT
Payer: COMMERCIAL

## 2022-12-30 DIAGNOSIS — R33.8 ACUTE URINARY RETENTION: ICD-10-CM

## 2022-12-30 PROCEDURE — 76770 US EXAM ABDO BACK WALL COMP: CPT

## 2023-01-04 ENCOUNTER — MEDICAL CORRESPONDENCE (OUTPATIENT)
Dept: HEALTH INFORMATION MANAGEMENT | Facility: CLINIC | Age: 53
End: 2023-01-04

## 2023-01-04 PROBLEM — M25.511 BILATERAL SHOULDER PAIN: Status: RESOLVED | Noted: 2019-04-26 | Resolved: 2023-01-04

## 2023-01-04 PROBLEM — M25.512 BILATERAL SHOULDER PAIN: Status: RESOLVED | Noted: 2019-04-26 | Resolved: 2023-01-04

## 2023-01-04 NOTE — PROGRESS NOTES
Patient seen for one time evaluation and treatment.  Patient did not return for further treatment and current status is unknown.  Please see initial evaluation for further information.

## 2023-01-05 ENCOUNTER — TRANSFERRED RECORDS (OUTPATIENT)
Dept: HEALTH INFORMATION MANAGEMENT | Facility: CLINIC | Age: 53
End: 2023-01-05

## 2023-01-06 ENCOUNTER — ALLIED HEALTH/NURSE VISIT (OUTPATIENT)
Dept: UROLOGY | Facility: CLINIC | Age: 53
End: 2023-01-06
Payer: COMMERCIAL

## 2023-01-06 DIAGNOSIS — R33.8 ACUTE URINARY RETENTION: Primary | ICD-10-CM

## 2023-01-06 PROCEDURE — 99207 PR NO CHARGE NURSE ONLY: CPT

## 2023-01-06 RX ORDER — SULFAMETHOXAZOLE/TRIMETHOPRIM 800-160 MG
1 TABLET ORAL ONCE
Qty: 1 TABLET | Refills: 0 | Status: SHIPPED | OUTPATIENT
Start: 2023-01-06 | End: 2023-01-06

## 2023-01-06 NOTE — PROGRESS NOTES
Zayra Ramirez comes into clinic today at the request of Danni Dai PACOrdering Provider for Trial of Void.  This service provided today was under the supervising provider of the day , who was available if needed.200 ml of sterile water instilled ,patient had a very strong urge to void,cathter removed patient voided 125 ml.patient was offered to choices leave catheter out and go home with the hope she voids at home or replace catheter for now she would like to go home and try if unable to void to the ED.  Push fluids and return for follow up as planned.  Elma Bauman LPN

## 2023-01-10 ENCOUNTER — VIRTUAL VISIT (OUTPATIENT)
Dept: GASTROENTEROLOGY | Facility: CLINIC | Age: 53
End: 2023-01-10
Payer: COMMERCIAL

## 2023-01-10 VITALS — WEIGHT: 196 LBS | BODY MASS INDEX: 33.64 KG/M2

## 2023-01-10 DIAGNOSIS — K21.9 GASTROESOPHAGEAL REFLUX DISEASE, UNSPECIFIED WHETHER ESOPHAGITIS PRESENT: ICD-10-CM

## 2023-01-10 DIAGNOSIS — K20.80 LOS ANGELES GRADE A ESOPHAGITIS: ICD-10-CM

## 2023-01-10 DIAGNOSIS — R19.7 DIARRHEA, UNSPECIFIED TYPE: Primary | ICD-10-CM

## 2023-01-10 PROCEDURE — 99213 OFFICE O/P EST LOW 20 MIN: CPT | Mod: 95 | Performed by: PHYSICIAN ASSISTANT

## 2023-01-10 ASSESSMENT — PAIN SCALES - GENERAL: PAINLEVEL: NO PAIN (0)

## 2023-01-10 NOTE — PATIENT INSTRUCTIONS
It was a pleasure taking care of you today.  I've included a brief summary of our discussion and care plan from today's visit below.  Please review this information with your primary care provider.  _______________________________________________________________________    My recommendations are summarized as follows:    1) monitor for any recurrence of loose stools. If this happens, let us know. In that event, I would recommend a trial of fiber supplement, such as powdered Benefiber, Metamucil or Citrucel, with 1 tablespoon daily.    2) Continue the omeprazole (Prilosec) 40 mg. If you do desire a trial off of it, remember that you can get significant heartburn and/or reflux symptoms after stopping it, which can persist for several days. You can use over-the-counter Tums, Pepcid, and/or Gaviscon to help with this. If the increased heartburn/reflux symptoms persist after 1-2 weeks, recommend resuming the Prilosec at previous dosing.      Return to GI Clinic as needed   ______________________________________________________________________    How do I schedule labs, imaging studies, or procedures that were ordered in clinic today?     Labs: To schedule lab appointment at the [facility] and [facility], use my chart or call [phone]. If you have a [place] lab closer to home where you are regularly seen you can give them a call.     Procedures: If a colonoscopy, upper endoscopy, breath test, esophageal manometry, or pH impedence was ordered today, our endoscopy team will call you to schedule this. If you have not heard from our endoscopy team within a week, please call [phone] to schedule.     Imaging Studies: If you were scheduled for a CT scan, X-ray, MRI, ultrasound, HIDA scan or other imaging study, please call [phone] to have this scheduled.     Referral: If a referral to another specialty was ordered, expect a phone call or follow instructions above. If you have not heard from anyone regarding your  referral in a week, please call our clinic to check the status.     Who do I call with any questions after my visit?  Please be in touch if there are any further questions that arise following today's visit.  There are multiple ways to contact your gastroenterology care team.      During business hours, you may reach a Gastroenterology nurse at [phone]    To schedule or reschedule an appointment, please call [phone].     You can always send a secure message through [employer].  [employer] messages are answered by your nurse or doctor typically within 24 hours.  Please allow extra time on weekends and holidays.      For urgent/emergent questions after business hours, you may reach the on-call GI Fellow by contacting the [facility] at [phone].     How will I get the results of any tests ordered?    You will receive all of your results.  If you have signed up for [employer], any tests ordered at your visit will be available to you after your physician reviews them.  Typically this takes 1-2 weeks.  If there are urgent results that require a change in your care plan, your physician or nurse will call you to discuss the next steps.      What is [employer]?  [employer] is a secure way for you to access all of your healthcare records from the [facility].  It is a web based computer program, so you can sign on to it from any location.  It also allows you to send secure messages to your care team.  I recommend signing up for [employer] access if you have not already done so and are comfortable with using a computer.      How to I schedule a follow-up visit?  If you did not schedule a follow-up visit today, please call [phone] to schedule a follow-up office visit.      Sincerely,    Dior Arroyo PA-C  Division of Gastroenterology, Hepatology & Nutrition  [facility]

## 2023-01-10 NOTE — LETTER
1/10/2023         RE: Zayra Ramirez  [address] [place] [address]        Dear Colleague,    Thank you for referring your patient, Zayra Ramirez, to the [facility] [place]. Please see a copy of my visit note below.    Zayra is a 52 year old who is being evaluated via a billable video visit.      How would you like to obtain your AVS? MyChart  If the video visit is dropped, the invitation should be resent by: Text to cell phone: [phone]  Will anyone else be joining your video visit? No      Video-Visit Details    Type of service:  Video Visit   Video Start Time: 1147  Video End Time:12:00 PM    Originating Location (pt. Location): Home    Distant Location (provider location):  Off-site  Platform used for Video Visit: [facility]     GI CLINIC VISIT    CC/REFERRING PROVIDER: Neena Tam  REASON FOR CONSULTATION: follow-up chronic diarrhea    HPI: 52 year old female with PMH of class II obesity, ROBERT, GERD with esophagitis, COPD presenting to GI clinic for follow-up of diarrhea.    Zayra has previously followed with Dr. Rahman and Abhi Villafuerte PA-C for chronic diarrhea. Enteric testing was negative. She had some improvement in symptoms with initiation of cholestyramine, with report of 2-4 clustered loose stools daily. Given improvement, colonoscopy with random biopsies was deferred. She was given a trial of cholestyramine, and stopped this due to improvement in symptoms.    Interval history:  At most recent follow-up, Zayra noted ongoing improvement in bowel habits with only episodic clustered loose stools every few weeks. She remained off of any bowel medications at that time. Today, Zayra notes ongoing improvement, now having daily satisfactory bowel movements without any associated concerns or BRBPR. She notes that the episodic loosoe stools have entirely resolved.    She has a question regarding heartburn and long term use of PPI. While on PPI,  she has breakthrough only several times per month, which can be random or diet-related. She has tried discontinuing the medication, however will need to take Tums often to palliate symptoms.    ROS: 10pt ROS performed and otherwise negative.    PAST MEDICAL HISTORY:  Past Medical History:   Diagnosis Date     Allergic rhinitis      Anemia      Arthritis      Asthma     copd     Dental abscess 08/2015     Depressive disorder      Gastroesophageal reflux disease      History of emphysema      Hoarseness      Hypertension      Obstructive sleep apnea      Other chronic pain      Respiratory bronchiolitis interstitial lung disease (H)      Sleep apnea      Smoker 11/02/2015       PREVIOUS ABDOMINAL/GYNECOLOGIC SURGERIES:  Past Surgical History:   Procedure Laterality Date     CERVICAL FUSION       COLONOSCOPY       COLONOSCOPY N/A 02/06/2020    Procedure: COLONOSCOPY, WITH POLYPECTOMY AND BIOPSY;  Surgeon: Julian Mccullough MD;  Location: [facility]     CYSTOSCOPY       ENT SURGERY       ESOPHAGOSCOPY, GASTROSCOPY, DUODENOSCOPY (EGD), COMBINED N/A 02/06/2020    Procedure: ESOPHAGOGASTRODUODENOSCOPY (EGD);  Surgeon: Julian Mccullough MD;  Location: [facility]     EXCISE LESION INTRAORAL Bilateral 10/03/2018    Procedure: EXCISE LESION INTRAORAL;  Wide Local Excision Of of Left Oral Cavity Ulcer;  Surgeon: Morro Mijares MD;  Location: [facility] OR     HC DRAIN SKIN ABSCESS SIMPLE/SINGLE  03/16/2012    Procedure:INCISION AND DRAINAGE, ABSCESS, SIMPLE; Surgeon:CHRISTIANO HANCOCK; Location: GI     HEAD & NECK SURGERY       HYSTERECTOMY       HYSTERECTOMY       INCISION AND DRAINAGE ABDOMEN WASHOUT, COMBINED       INJECT EPIDURAL CERVICAL N/A 10/14/2021    Procedure: Cervical 7- Thoracic 1 epidural steroid injection with fluoroscopy;  Surgeon: Tram Florian MD;  Location: [facility]     INJECT EPIDURAL LUMBAR Right 09/15/2020    Procedure: Lumbar5- sacral 1 epidural steroid injection with fluoroscopy;  Surgeon: Chiqui  MD Tram;  [facility]     INJECT EPIDURAL LUMBAR Right 06/29/2021    Procedure: Lumbar 5 sacral 1 epidural steroid injection with fluoroscopy;  Surgeon: Tram Florian MD;  Location: [facility]     INJECT SACROILIAC JOINT Bilateral 06/16/2020    Procedure: Bilateral sacroiliac joint steroid injection with fluoroscopy;  Surgeon: Tram Florian MD;  [facility]     LAMINECTOMY THORACIC ONE LEVEL N/A 08/19/2019    Procedure: LAMINECTOMY, SPINE, THORACIC, 11-12 and Part of Lumbar 1, DRAINAGE OF EPIDURAL ABCESS, Epidural Drain Placement X 2;  Surgeon: Yadiel Beal MD;  Location: [facility]     OPTICAL TRACKING SYSTEM FUSION SPINE POSTERIOR LUMBAR THREE+ LEVELS N/A 11/30/2022    Procedure: Posterior Instrumented Spinal Fusion Thoracic 8 to Sacrum with Bilateral Pelvic Fixation; Transforaminal Lumbar Interbody Fusion with Erickson-Nixon Osteotomy Lumbar 1 to Sacral 1 (5 levels); Use of Infuse Bone Morphogenetic Protein Large Kit and Allograft;  Surgeon: Philip Wilhelm MD;  Location: [facility]     CO PERCUT IMPLNT NEUROELECT,EPIDURAL N/A 08/08/2019    Procedure: TRIAL, SPINAL CORD STIMULATOR WITH BOSTON SCIENTIFIC;  Surgeon: Sipple, Daniel Peter, DO;  Location: [facility];  Service: Pain     RADIO FREQUENCY ABLATION / DESTRUCTION OF SACROILOAC JOINT DORSAL PRIMARY RAMUS Bilateral 12/17/2019    Procedure: Bilateral lumbar radiofrequency ablation with fluoroscopy and intravenous sedation ( Lumbar 2,3,4,5 medial branch nerves for the bilateral lumbar3-4, 4-5 and 5-sacral1 joints.;  Surgeon: Tram Florian MD;  [facility]     RADIO FREQUENCY ABLATION / DESTRUCTION OF SACROILOAC JOINT DORSAL PRIMARY RAMUS Right 11/17/2020    Procedure: Right lumbar medial branch nerve radiofrequency ablation right L2,3,4,5 nerves supplying the right L3-4, L4-5 and L5-S1 facet joints;  Surgeon: Tram Florian MD;  Location: [facility] OR     spinal cord stimulator  08/08/2019     spinal cord stimulator removal   08/13/2019         PERTINENT MEDICATIONS:  Current Outpatient Medications   Medication Sig Dispense Refill     acetaminophen (TYLENOL) 325 MG tablet Take 2 tablets (650 mg) by mouth every 4 hours as needed for pain 100 tablet 1     albuterol (PROAIR HFA/PROVENTIL HFA/VENTOLIN HFA) 108 (90 Base) MCG/ACT inhaler Inhale 2 puffs into the lungs every 6 hours as needed for shortness of breath / dyspnea or wheezing Ventolin please 18 g 2     albuterol (PROVENTIL) (2.5 MG/3ML) 0.083% neb solution INHALE 3 ML VIA A NEBULIZER 4 TIMES DAILY IF NEEDED.       ARIPiprazole (ABILIFY) 5 MG tablet Take 5 mg by mouth daily       Black Pepper-Turmeric (TURMERIC CURCUMIN) 5-1000 MG CAPS Take 1 capsule by mouth daily       busPIRone HCl (BUSPAR) 30 MG tablet Take 30 mg by mouth 2 times daily        carboxymethylcellulose (CARBOXYMETHYLCELLULOSE SODIUM) 0.5 % SOLN ophthalmic solution Place 1 drop into both eyes 3 times daily as needed for dry eyes 15 mL 11     DULoxetine (CYMBALTA) 60 MG capsule Take 120 mg by mouth At Bedtime        EPINEPHrine (EPIPEN/ADRENACLICK/OR ANY BX GENERIC EQUIV) 0.3 MG/0.3ML injection 2-pack Inject 0.3 mg into the muscle as needed       ferrous fumarate 65 mg, [place]. FE,-Vitamin C 125 mg (VITRON C)  MG TABS tablet Take 1 tablet by mouth daily       folic acid (FOLVITE) 1 MG tablet Take 1 tablet (1 mg) by mouth At Bedtime Skips Sunday nights       hydroxychloroquine (PLAQUENIL) 200 MG tablet Take 1 tablet (200 mg) by mouth 2 times daily 60 tablet 4     hydrOXYzine (ATARAX) 25 MG tablet Take 1 tablet (25 mg) by mouth every 6 hours as needed for other (adjuvant pain) 30 tablet 2     insulin pen needle (32G X 4 MM) 32G X 4 MM miscellaneous Use 1 NEEDLE WEEKLY 12 each 3     insulin pen needle (32G X 4 MM) 32G X 4 MM miscellaneous Use 1 NEEDLE daily to use with Saxenda 100 each 3     insulin pen needle (32G X 6 MM) 32G X 6 MM miscellaneous Use daily pen needles daily or as directed. 100 each 2     montelukast  (SINGULAIR) 10 MG tablet Take 10 mg by mouth daily       omeprazole (PRILOSEC) 40 MG DR capsule Take 1 capsule (40 mg) by mouth daily 180 capsule 3     oxyCODONE (ROXICODONE) 5 MG tablet Take 1 tablet (5 mg) by mouth every 12 hours as needed for severe pain (7-10) (wean off as pain improves) 14 tablet 0     OXYGEN-HELIUM IN 3L @ night    Oxygen only not helium       polyethylene glycol (MIRALAX) 17 g packet Take 17 g by mouth daily 10 packet 1     pregabalin (LYRICA) 150 MG capsule Take 1 capsule (150 mg) by mouth 2 times daily AM and around 1 PM       Respiratory Therapy Supplies ([facility] CPAP FILTER) MISC        rOPINIRole (REQUIP) 0.5 MG tablet Take 1 tablet (0.5 mg) by mouth At Bedtime Take 1 tab by mouth at bedtime. 90 tablet 1     SAXENDA 18 MG/3ML pen INJECT 3 MG SUBCUTANEOUS DAILY 15 mL 1     senna-docusate (SENOKOT-S/PERICOLACE) 8.6-50 MG tablet Take 1 tablet by mouth 2 times daily 30 tablet 2     tiZANidine (ZANAFLEX) 2 MG tablet Take 1 tablet (2 mg) by mouth 3 times daily as needed for muscle spasms 40 tablet 2     vitamin D3 (CHOLECALCIFEROL) 250 mcg (11333 units) capsule Take 1 capsule by mouth daily       vitamin E (TOCOPHEROL) 1000 units (450 mg) CAPS capsule Take 1,000 Units by mouth daily       SOCIAL HISTORY:    Social History     Socioeconomic History     Marital status: Single     Spouse name: Not on file     Number of children: Not on file     Years of education: Not on file     Highest education level: Not on file   Occupational History     Not on file   Tobacco Use     Smoking status: Former     Packs/day: 1.00     Years: 30.00     Pack years: 30.00     Types: Cigarettes     Start date: 1996     Quit date: 2021     Years since quittin.1     Smokeless tobacco: Never   Vaping Use     Vaping Use: Former   Substance and Sexual Activity     Alcohol use: No     Drug use: Yes     Types: Marijuana     Comment: very rarely      Sexual activity: Never   Other Topics Concern      Parent/sibling w/ CABG, MI or angioplasty before 65F 55M? Not Asked   Social History Narrative     Not on file     Social Determinants of Health     Financial Resource Strain: Not on file   Food Insecurity: Not on file   Transportation Needs: Not on file   Physical Activity: Not on file   Stress: Not on file   Social Connections: Not on file   Intimate Partner Violence: Not on file   Housing Stability: Not on file       FAMILY HISTORY:  Family History   Problem Relation Age of Onset     Asthma Mother      Restless Leg Syndrome Mother      Other Cancer Father         base of tongue cancer at age ~65     Hypertension Father      Back Pain Father      Restless Leg Syndrome Father      Coronary Artery Disease Father      Restless Leg Syndrome Sister      Breast Cancer Maternal Aunt 55     Anesthesia Reaction No family hx of      Deep Vein Thrombosis (DVT) No family hx of      Thyroid Cancer No family hx of      Multiple endocrine neoplasia No family hx of        PHYSICAL EXAMINATION:  Vitals reviewed  Wt 88.9 kg (196 lb)   LMP  (LMP Unknown)   BMI 33.64 kg/m    Video physical exam  General: Patient appears well in no acute distress.   Skin: No visualized rash or lesions on visualized skin  Eyes: EOMI, no erythema, sclera icterus or discharge noted  Resp: Appears to be breathing comfortably without accessory muscle usage, speaking in full sentences, no cough  MSK: Appears to have normal range of motion based on visualized movements  Neurologic: No apparent tremors, facial movements symmetric  Psych: affect normal, alert and oriented    The rest of a comprehensive physical examination is deferred due to PHE (public health emergency) video restrictions      PERTINENT STUDIES Reviewed in EMR    ASSESSMENT/PLAN:    # Loose stools, intermittent  Symptoms have now entirely resolved, and remains symptom-free without any use of any supplements or medications. We discussed monitoring for any symptom recurrence. In that event,  would recommend a trial of powdered fiber supplement (ie Metamucil, Citrucel, Benefiber) 1 Tablespoon daily.    # GERD  # LA grade A esophagitis  EGD 2020 showing LA grade esophagitis. Symptoms are well-controlled on PPI 40 daily. We discussed in detail the good safety profile of PPIs, that the recent studies have significant limitations and can only show associations (not causality), and that since she has had a documented benefit of PPIs it would be very reasonable to continue this treatment in light of the small/theoretic possible risks. We did discuss how to discontinue the medication should she desire a trial off of it.      RTC PRN per patient preference  Thank you for this consultation. It was a pleasure to participate in the care of this patient; please contact us with any further questions.    21 nminutes spent on the date of the encounter doing chart review, review of test results, patient visit and documentation          Again, thank you for allowing me to participate in the care of your patient.      Sincerely,    Dior Arroyo PA-C

## 2023-01-10 NOTE — PROGRESS NOTES
Zayra is a 52 year old who is being evaluated via a billable video visit.      How would you like to obtain your AVS? MyChart  If the video visit is dropped, the invitation should be resent by: Text to cell phone: [phone]  Will anyone else be joining your video visit? No      Video-Visit Details    Type of service:  Video Visit   Video Start Time: 1147  Video End Time:12:00 PM    Originating Location (pt. Location): Home    Distant Location (provider location):  Off-site  Platform used for Video Visit: [facility]     GI CLINIC VISIT    CC/REFERRING PROVIDER: Neena Tam  REASON FOR CONSULTATION: follow-up chronic diarrhea    HPI: 52 year old female with PMH of class II obesity, ROBERT, GERD with esophagitis, COPD presenting to GI clinic for follow-up of diarrhea.    Zayra has previously followed with Dr. Rahman and Abhi Villafuerte PA-C for chronic diarrhea. Enteric testing was negative. She had some improvement in symptoms with initiation of cholestyramine, with report of 2-4 clustered loose stools daily. Given improvement, colonoscopy with random biopsies was deferred. She was given a trial of cholestyramine, and stopped this due to improvement in symptoms.    Interval history:  At most recent follow-up, Zayra noted ongoing improvement in bowel habits with only episodic clustered loose stools every few weeks. She remained off of any bowel medications at that time. Today, Zayra notes ongoing improvement, now having daily satisfactory bowel movements without any associated concerns or BRBPR. She notes that the episodic loosoe stools have entirely resolved.    She has a question regarding heartburn and long term use of PPI. While on PPI, she has breakthrough only several times per month, which can be random or diet-related. She has tried discontinuing the medication, however will need to take Tums often to palliate symptoms.    ROS: 10pt ROS performed and otherwise negative.    PAST MEDICAL HISTORY:  Past  Medical History:   Diagnosis Date     Allergic rhinitis      Anemia      Arthritis      Asthma     copd     Dental abscess 08/2015     Depressive disorder      Gastroesophageal reflux disease      History of emphysema      Hoarseness      Hypertension      Obstructive sleep apnea      Other chronic pain      Respiratory bronchiolitis interstitial lung disease (H)      Sleep apnea      Smoker 11/02/2015       PREVIOUS ABDOMINAL/GYNECOLOGIC SURGERIES:  Past Surgical History:   Procedure Laterality Date     CERVICAL FUSION       COLONOSCOPY       COLONOSCOPY N/A 02/06/2020    Procedure: COLONOSCOPY, WITH POLYPECTOMY AND BIOPSY;  Surgeon: Julian Mccullough MD;  Location: [facility]     CYSTOSCOPY       ENT SURGERY       ESOPHAGOSCOPY, GASTROSCOPY, DUODENOSCOPY (EGD), COMBINED N/A 02/06/2020    Procedure: ESOPHAGOGASTRODUODENOSCOPY (EGD);  Surgeon: Julian Mccullough MD;  Location: [facility]     EXCISE LESION INTRAORAL Bilateral 10/03/2018    Procedure: EXCISE LESION INTRAORAL;  Wide Local Excision Of of Left Oral Cavity Ulcer;  Surgeon: Morro Mijares MD;  Location: [facility] OR     HC DRAIN SKIN ABSCESS SIMPLE/SINGLE  03/16/2012    Procedure:INCISION AND DRAINAGE, ABSCESS, SIMPLE; Surgeon:CHRISTIANO HANCOCK; Location: GI     HEAD & NECK SURGERY       HYSTERECTOMY       HYSTERECTOMY       INCISION AND DRAINAGE ABDOMEN WASHOUT, COMBINED       INJECT EPIDURAL CERVICAL N/A 10/14/2021    Procedure: Cervical 7- Thoracic 1 epidural steroid injection with fluoroscopy;  Surgeon: Tram Florian MD;  Location: [facility]     INJECT EPIDURAL LUMBAR Right 09/15/2020    Procedure: Lumbar5- sacral 1 epidural steroid injection with fluoroscopy;  Surgeon: Tram Florian MD;  [facility]     INJECT EPIDURAL LUMBAR Right 06/29/2021    Procedure: Lumbar 5 sacral 1 epidural steroid injection with fluoroscopy;  Surgeon: Tram Florian MD;  Location: [facility] OR     INJECT SACROILIAC JOINT Bilateral 06/16/2020    Procedure: Bilateral  sacroiliac joint steroid injection with fluoroscopy;  Surgeon: Tram Florian MD;  [facility]     LAMINECTOMY THORACIC ONE LEVEL N/A 08/19/2019    Procedure: LAMINECTOMY, SPINE, THORACIC, 11-12 and Part of Lumbar 1, DRAINAGE OF EPIDURAL ABCESS, Epidural Drain Placement X 2;  Surgeon: Yadiel Beal MD;  Location: [facility] OR     OPTICAL TRACKING SYSTEM FUSION SPINE POSTERIOR LUMBAR THREE+ LEVELS N/A 11/30/2022    Procedure: Posterior Instrumented Spinal Fusion Thoracic 8 to Sacrum with Bilateral Pelvic Fixation; Transforaminal Lumbar Interbody Fusion with Erickson-Nixon Osteotomy Lumbar 1 to Sacral 1 (5 levels); Use of Infuse Bone Morphogenetic Protein Large Kit and Allograft;  Surgeon: Philip Wilhelm MD;  Location: [facility] OR     WI PERCUT IMPLNT NEUROELECT,EPIDURAL N/A 08/08/2019    Procedure: TRIAL, SPINAL CORD STIMULATOR WITH [employer];  Surgeon: Sipple, Daniel Peter, DO;  Location: [facility];  Service: Pain     RADIO FREQUENCY ABLATION / DESTRUCTION OF SACROILOAC JOINT DORSAL PRIMARY RAMUS Bilateral 12/17/2019    Procedure: Bilateral lumbar radiofrequency ablation with fluoroscopy and intravenous sedation ( Lumbar 2,3,4,5 medial branch nerves for the bilateral lumbar3-4, 4-5 and 5-sacral1 joints.;  Surgeon: Tram Florian MD;  Location:  OR     RADIO FREQUENCY ABLATION / DESTRUCTION OF SACROILOAC JOINT DORSAL PRIMARY RAMUS Right 11/17/2020    Procedure: Right lumbar medial branch nerve radiofrequency ablation right L2,3,4,5 nerves supplying the right L3-4, L4-5 and L5-S1 facet joints;  Surgeon: Tram Florian MD;  Location: [facility] OR     spinal cord stimulator  08/08/2019     spinal cord stimulator removal  08/13/2019         PERTINENT MEDICATIONS:  Current Outpatient Medications   Medication Sig Dispense Refill     acetaminophen (TYLENOL) 325 MG tablet Take 2 tablets (650 mg) by mouth every 4 hours as needed for pain 100 tablet 1     albuterol (PROAIR HFA/PROVENTIL HFA/VENTOLIN  HFA) 108 (90 Base) MCG/ACT inhaler Inhale 2 puffs into the lungs every 6 hours as needed for shortness of breath / dyspnea or wheezing Ventolin please 18 g 2     albuterol (PROVENTIL) (2.5 MG/3ML) 0.083% neb solution INHALE 3 ML VIA A NEBULIZER 4 TIMES DAILY IF NEEDED.       ARIPiprazole (ABILIFY) 5 MG tablet Take 5 mg by mouth daily       Black Pepper-Turmeric (TURMERIC CURCUMIN) 5-1000 MG CAPS Take 1 capsule by mouth daily       busPIRone HCl (BUSPAR) 30 MG tablet Take 30 mg by mouth 2 times daily        carboxymethylcellulose (CARBOXYMETHYLCELLULOSE SODIUM) 0.5 % SOLN ophthalmic solution Place 1 drop into both eyes 3 times daily as needed for dry eyes 15 mL 11     DULoxetine (CYMBALTA) 60 MG capsule Take 120 mg by mouth At Bedtime        EPINEPHrine (EPIPEN/ADRENACLICK/OR ANY BX GENERIC EQUIV) 0.3 MG/0.3ML injection 2-pack Inject 0.3 mg into the muscle as needed       ferrous fumarate 65 mg, [place]. FE,-Vitamin C 125 mg (VITRON C)  MG TABS tablet Take 1 tablet by mouth daily       folic acid (FOLVITE) 1 MG tablet Take 1 tablet (1 mg) by mouth At Bedtime Skips Sunday nights       hydroxychloroquine (PLAQUENIL) 200 MG tablet Take 1 tablet (200 mg) by mouth 2 times daily 60 tablet 4     hydrOXYzine (ATARAX) 25 MG tablet Take 1 tablet (25 mg) by mouth every 6 hours as needed for other (adjuvant pain) 30 tablet 2     insulin pen needle (32G X 4 MM) 32G X 4 MM miscellaneous Use 1 NEEDLE WEEKLY 12 each 3     insulin pen needle (32G X 4 MM) 32G X 4 MM miscellaneous Use 1 NEEDLE daily to use with Saxenda 100 each 3     insulin pen needle (32G X 6 MM) 32G X 6 MM miscellaneous Use daily pen needles daily or as directed. 100 each 2     montelukast (SINGULAIR) 10 MG tablet Take 10 mg by mouth daily       omeprazole (PRILOSEC) 40 MG DR capsule Take 1 capsule (40 mg) by mouth daily 180 capsule 3     oxyCODONE (ROXICODONE) 5 MG tablet Take 1 tablet (5 mg) by mouth every 12 hours as needed for severe pain (7-10) (wean off as  pain improves) 14 tablet 0     OXYGEN-HELIUM IN 3L @ night    Oxygen only not helium       polyethylene glycol (MIRALAX) 17 g packet Take 17 g by mouth daily 10 packet 1     pregabalin (LYRICA) 150 MG capsule Take 1 capsule (150 mg) by mouth 2 times daily AM and around 1 PM       Respiratory Therapy Supplies ([facility] CPAP FILTER) MISC        rOPINIRole (REQUIP) 0.5 MG tablet Take 1 tablet (0.5 mg) by mouth At Bedtime Take 1 tab by mouth at bedtime. 90 tablet 1     SAXENDA 18 MG/3ML pen INJECT 3 MG SUBCUTANEOUS DAILY 15 mL 1     senna-docusate (SENOKOT-S/PERICOLACE) 8.6-50 MG tablet Take 1 tablet by mouth 2 times daily 30 tablet 2     tiZANidine (ZANAFLEX) 2 MG tablet Take 1 tablet (2 mg) by mouth 3 times daily as needed for muscle spasms 40 tablet 2     vitamin D3 (CHOLECALCIFEROL) 250 mcg (07702 units) capsule Take 1 capsule by mouth daily       vitamin E (TOCOPHEROL) 1000 units (450 mg) CAPS capsule Take 1,000 Units by mouth daily       SOCIAL HISTORY:    Social History     Socioeconomic History     Marital status: Single     Spouse name: Not on file     Number of children: Not on file     Years of education: Not on file     Highest education level: Not on file   Occupational History     Not on file   Tobacco Use     Smoking status: Former     Packs/day: 1.00     Years: 30.00     Pack years: 30.00     Types: Cigarettes     Start date: 1996     Quit date: 2021     Years since quittin.1     Smokeless tobacco: Never   Vaping Use     Vaping Use: Former   Substance and Sexual Activity     Alcohol use: No     Drug use: Yes     Types: Marijuana     Comment: very rarely      Sexual activity: Never   Other Topics Concern     Parent/sibling w/ CABG, MI or angioplasty before 65F 55M? Not Asked   Social History Narrative     Not on file     Social Determinants of Health     Financial Resource Strain: Not on file   Food Insecurity: Not on file   Transportation Needs: Not on file   Physical Activity:  Not on file   Stress: Not on file   Social Connections: Not on file   Intimate Partner Violence: Not on file   Housing Stability: Not on file       FAMILY HISTORY:  Family History   Problem Relation Age of Onset     Asthma Mother      Restless Leg Syndrome Mother      Other Cancer Father         base of tongue cancer at age ~65     Hypertension Father      Back Pain Father      Restless Leg Syndrome Father      Coronary Artery Disease Father      Restless Leg Syndrome Sister      Breast Cancer Maternal Aunt 55     Anesthesia Reaction No family hx of      Deep Vein Thrombosis (DVT) No family hx of      Thyroid Cancer No family hx of      Multiple endocrine neoplasia No family hx of        PHYSICAL EXAMINATION:  Vitals reviewed  Wt 88.9 kg (196 lb)   LMP  (LMP Unknown)   BMI 33.64 kg/m    Video physical exam  General: Patient appears well in no acute distress.   Skin: No visualized rash or lesions on visualized skin  Eyes: EOMI, no erythema, sclera icterus or discharge noted  Resp: Appears to be breathing comfortably without accessory muscle usage, speaking in full sentences, no cough  MSK: Appears to have normal range of motion based on visualized movements  Neurologic: No apparent tremors, facial movements symmetric  Psych: affect normal, alert and oriented    The rest of a comprehensive physical examination is deferred due to PHE (public health emergency) video restrictions      PERTINENT STUDIES Reviewed in EMR    ASSESSMENT/PLAN:    # Loose stools, intermittent  Symptoms have now entirely resolved, and remains symptom-free without any use of any supplements or medications. We discussed monitoring for any symptom recurrence. In that event, would recommend a trial of powdered fiber supplement (ie Metamucil, Citrucel, Benefiber) 1 Tablespoon daily.    # GERD  # LA grade A esophagitis  EGD 2020 showing LA grade esophagitis. Symptoms are well-controlled on PPI 40 daily. We discussed in detail the good safety profile  of PPIs, that the recent studies have significant limitations and can only show associations (not causality), and that since she has had a documented benefit of PPIs it would be very reasonable to continue this treatment in light of the small/theoretic possible risks. We did discuss how to discontinue the medication should she desire a trial off of it.      RTC PRN per patient preference  Thank you for this consultation. It was a pleasure to participate in the care of this patient; please contact us with any further questions.    Dior Arroyo PA-C    21 nminutes spent on the date of the encounter doing chart review, review of test results, patient visit and documentation

## 2023-01-11 ENCOUNTER — TRANSFERRED RECORDS (OUTPATIENT)
Dept: HEALTH INFORMATION MANAGEMENT | Facility: CLINIC | Age: 53
End: 2023-01-11

## 2023-01-12 ENCOUNTER — OFFICE VISIT (OUTPATIENT)
Dept: ORTHOPEDICS | Facility: CLINIC | Age: 53
End: 2023-01-12
Payer: COMMERCIAL

## 2023-01-12 ENCOUNTER — ANCILLARY PROCEDURE (OUTPATIENT)
Dept: GENERAL RADIOLOGY | Facility: CLINIC | Age: 53
End: 2023-01-12
Attending: ORTHOPAEDIC SURGERY
Payer: COMMERCIAL

## 2023-01-12 DIAGNOSIS — S12.9XXS PSEUDOARTHROSIS OF CERVICAL SPINE, SEQUELA: ICD-10-CM

## 2023-01-12 DIAGNOSIS — M54.12 CERVICAL RADICULOPATHY: ICD-10-CM

## 2023-01-12 DIAGNOSIS — Z98.1 S/P SPINAL FUSION: ICD-10-CM

## 2023-01-12 DIAGNOSIS — M54.12 CERVICAL RADICULOPATHY: Primary | ICD-10-CM

## 2023-01-12 DIAGNOSIS — Z98.1 STATUS POST THORACIC SPINAL FUSION: ICD-10-CM

## 2023-01-12 DIAGNOSIS — Z98.1 S/P SPINAL FUSION: Primary | ICD-10-CM

## 2023-01-12 DIAGNOSIS — M47.816 LUMBAR SPONDYLOSIS: ICD-10-CM

## 2023-01-12 PROCEDURE — 99024 POSTOP FOLLOW-UP VISIT: CPT | Performed by: ORTHOPAEDIC SURGERY

## 2023-01-12 PROCEDURE — 72082 X-RAY EXAM ENTIRE SPI 2/3 VW: CPT | Performed by: SURGERY

## 2023-01-12 RX ORDER — HYDROCODONE BITARTRATE AND ACETAMINOPHEN 5; 325 MG/1; MG/1
1 TABLET ORAL EVERY 6 HOURS PRN
Qty: 20 TABLET | Refills: 0 | Status: SHIPPED | OUTPATIENT
Start: 2023-01-12 | End: 2023-01-15

## 2023-01-12 RX ORDER — HYDROCODONE BITARTRATE AND ACETAMINOPHEN 5; 325 MG/1; MG/1
1 TABLET ORAL EVERY 6 HOURS PRN
Qty: 20 TABLET | Refills: 0 | Status: CANCELLED | OUTPATIENT
Start: 2023-01-12 | End: 2023-01-15

## 2023-01-12 RX ORDER — HYDROXYZINE HYDROCHLORIDE 25 MG/1
25 TABLET, FILM COATED ORAL EVERY 6 HOURS PRN
Qty: 30 TABLET | Refills: 2 | Status: SHIPPED | OUTPATIENT
Start: 2023-01-12 | End: 2023-07-05

## 2023-01-12 RX ORDER — TIZANIDINE 2 MG/1
2 TABLET ORAL 3 TIMES DAILY PRN
Qty: 40 TABLET | Refills: 2 | Status: SHIPPED | OUTPATIENT
Start: 2023-01-12 | End: 2023-03-31

## 2023-01-12 RX ORDER — ACETAMINOPHEN 325 MG/1
650 TABLET ORAL EVERY 4 HOURS PRN
Qty: 100 TABLET | Refills: 1 | Status: SHIPPED | OUTPATIENT
Start: 2023-01-12 | End: 2023-07-05

## 2023-01-12 RX ORDER — DIAZEPAM 5 MG
TABLET ORAL
Qty: 2 TABLET | Refills: 0 | Status: CANCELLED | OUTPATIENT
Start: 2023-01-12

## 2023-01-12 RX ORDER — DIAZEPAM 5 MG
TABLET ORAL
Qty: 2 TABLET | Refills: 0 | Status: SHIPPED | OUTPATIENT
Start: 2023-01-12 | End: 2023-07-05

## 2023-01-12 NOTE — LETTER
1/12/2023         RE: Zayra PADRON James  [address] [place] [address]        Dear Colleague,    Thank you for referring your patient, Zayra Ramirez, to the [facility] [place]. Please see a copy of my visit note below.    Spine Surgical Hx:  10/16/2017 - ACDF with plate fixation C3-C5 (2 levels); use of [employer]stone allograft (Dr. Rylan Monique, [facility]).  [Implants: Medtronic Zevo plate].  08/08/2019 - SCS implantation ([facility]); complicated by epidural abscess MSSA culture positive, treated with drainage/laminectomy and IV antibiotics.  Thus, the SCS was removed.  08/19/2019 - Laminectomies T11-L1 (T12-L2) ([facility]).  11/30/2022 - PISF T8-pelvis with bilateral stacked pelvic fixation; TLIF-SPO L1-S1 (5 levels); use of Infuse BMP and allograft (Choco/Shavon) for multilevel sponydylosis and stenosis, thoracolumbar junction kyphosis.  [Implants: Medtronic Solera, 5.5 mm CoCr rods x4 (UNID rods x2); Capstone Control PTC cages; SI-Bone Granite screws x 4].     BMD Hx:  11/24/21 - DEXA spine/hip/wrist.  T-score = -1.1 (Left femoral neck).  Imp: Osteopenia.  12/8/21 - Saw Dr. Guerra ([facility]).  P> RTC 6 wks; had not been seen since.      In-Person Visit    Chief Complaint   Patient presents with     Surgical Followup     DOS 11/30/22 S/P Posterior Instrumented Spinal Fusion Thoracic 8 to Sacrum with Bilateral Pelvic Fixation; Transforaminal Lumbar Interbody Fusion with Erickson-Nixon Osteotomy Lumbar 1 to Sacral 1 (5 levels); Use of Infuse Bone Morphogenetic Protein Large Kit and Allograft       S>  52 year old female, RHD, 6 wks postop; 1st postop visit.    Accompanied by mom.  Happy to tell me that her preoperative symptoms are improved, including bilateral leg pain and numbness (anterior thighs).  Also feels her posture is better than before.  Continues to wear Marge brace when out of bed.  Had done home PT, just finished, looking forward to starting outpatient  PT.    However, complains more of chronic axial neck pain, with worsening right arm radicular symptoms involving her thumb, index, ring and small finger; thus sparing the long finger.  Suggestive of C6 and C8 distribution.  Of note, has had previous two-level ACDF C3-C5 in 2017.  Cervical CT scan from 2020 had shown pseudoarthrosis, with breakage of one of the C3 screws.    Off opioids.      Oswestry (KATIE) Questionnaire    OSWESTRY DISABILITY INDEX 1/11/2023   Count 4   Sum 8   Oswestry Score (%) 40   Some recent data might be hidden      KATIE preop 70%  6wk  40%    Visual Analog Pain Scale  Back Pain Scale 0-10: 4  Right leg pain: 0  Left leg pain: 0    PROMIS-10 Scores  Global Mental Health Score: (P) 12  Global Physical Health Score: (P) 14  PROMIS TOTAL - SUBSCORES: (P) 26    O>   Alert, oriented x 3, cooperative.  Not in CP distress.  LMP  (LMP Unknown)   Surgical incision (posterior midline thoracolumbar spine) well-healed, no sign of infection.  Ambulates independently using cane on right.  Wears Marge brace.  Grossly neurologically intact both upper and both lower extremities.    Imaging:    EOS full spine AP lateral standing x-rays taken today show bilateral segmental posterior instrumentation T8 to pelvis, with bilateral stacked pelvic screw fixation; quadruple janet construct.  5 level interbody cage placement L1-S1.  Overall, x-rays are very reassuring.  No sign of fixation loosening or failure.  No sign of proximal junctional failure.  The previously noted thoracolumbar junction kyphosis preoperatively is also much improved.    A>   52/f RHD with:  Lumbar spine:  1.  6 weeks s/p PISF T8 to pelvis; TLIF-SPO L1-L5 (5 levels) (11/30/2022), doing well, with improved preoperative pain symptoms.  2.  Smoking history x 35 yrs, quit in November 2021.  3.  Class II obesity (current BMI 35.7 on 8/16/22, decreased from 39.8 on 6/2/22).  4.  Osteopenia (DEXA 11/24/21; T-score = -1.1).  Cervical spine:  1.  Anterior  pseudarthrosis C3-4 and C4-5, with failure of anterior plate fixation.  2.  S/p ACDF C3-C5 (10/16/2017 [facility]).  3.  Chronic axial neck pain and R arm radicular pain (C6 and C8 distribution).    P>    Congratulated and reassured patient.  I am very happy that she could already feel improvement of preoperative symptoms, including lower extremity numbness and pain.  She also feels that her posture is better.  I reassured her that at 6 weeks out, she can still look forward to further improvement of ongoing postsurgical symptoms.  Regarding her complaint of pain around her right periscapular region, I suspect this is related more to her cervical spine.  She had previous cervical spine fusion, that renal has gone on to pseudoarthrosis.  Her last cervical CT scan was from 2020, which also showed one of the screws to be broken.  At present, she complains of bothersome axial neck pain, as well as progressive right upper extremity radicular symptoms (C6 and C8 pattern).  Eventually, may consider revision cervical spine surgery for pseudoarthrosis repair.    - Internal PT order - for core stabilization, conditioning, LE strengthening.  Prefers to go to [facility].  May increase lifting restriction from 10 pounds to 20 pounds.  May also decrease brace wear to only when she would need to be doing activities, walking around, or going outside the house.  May also at this point start NSAIDs (e.g. ibuprofen) as needed.  Anticipate that she may be able to go back to driving at the 3-month osorio.  - Cervical MRI  - Cervical CT    RTC in 6 wks (3 mos postop) with rpt EOS full spine ap-lat and cervical flex-ext x-rays, also to review MRI and CT.  Eventually may consider revision c-spine surgery for pseudarthrosis repair.      Philip Wilhelm MD    Orthopaedic Spine Surgery  Dept Orthopaedic Surgery, [facility]  [phone] office, [phone] pager  [email or web address]

## 2023-01-12 NOTE — PROGRESS NOTES
Spine Surgical Hx:  10/16/2017 - ACDF with plate fixation C3-C5 (2 levels); use of Cornerstone allograft (Dr. Rylan Monique, [facility]).  [Implants: Medtronic Zevo plate].  08/08/2019 - SCS implantation ([place] [facility]); complicated by epidural abscess MSSA culture positive, treated with drainage/laminectomy and IV antibiotics.  Thus, the SCS was removed.  08/19/2019 - Laminectomies T11-L1 (T12-L2) ([facility]  11/30/2022 - PISF T8-pelvis with bilateral stacked pelvic fixation; TLIF-SPO L1-S1 (5 levels); use of Infuse BMP and allograft (Choco/Shavon) for multilevel sponydylosis and stenosis, thoracolumbar junction kyphosis.  [Implants: Medtronic Solera, 5.5 mm CoCr rods x4 (UNID rods x2); Capstone Control PTC cages; SI-Bone Granite screws x 4].     BMD Hx:  11/24/21 - DEXA spine/hip/wrist.  T-score = -1.1 (Left femoral neck).  Imp: Osteopenia.  12/8/21 - Saw Dr. Guerra ([facility]).  P> RTC 6 wks; had not been seen since.      In-Person Visit    Chief Complaint   Patient presents with     Surgical Followup     DOS 11/30/22 S/P Posterior Instrumented Spinal Fusion Thoracic 8 to Sacrum with Bilateral Pelvic Fixation; Transforaminal Lumbar Interbody Fusion with Erickson-Nixon Osteotomy Lumbar 1 to Sacral 1 (5 levels); Use of Infuse Bone Morphogenetic Protein Large Kit and Allograft       S>  52 year old female, RHD, 6 wks postop; 1st postop visit.    Accompanied by mom.  Happy to tell me that her preoperative symptoms are improved, including bilateral leg pain and numbness (anterior thighs).  Also feels her posture is better than before.  Continues to wear [place] brace when out of bed.  Had done home PT, just finished, looking forward to starting outpatient PT.    However, complains more of chronic axial neck pain, with worsening right arm radicular symptoms involving her thumb, index, ring and small finger; thus sparing the long finger.  Suggestive of C6 and C8 distribution.  Of note, has had previous two-level ACDF C3-C5  in 2017.  Cervical CT scan from 2020 had shown pseudoarthrosis, with breakage of one of the C3 screws.    Off opioids.      Oswestry (KATIE) Questionnaire    OSWESTRY DISABILITY INDEX 1/11/2023   Count 4   Sum 8   Oswestry Score (%) 40   Some recent data might be hidden      KATIE preop 70%  6wk  40%    Visual Analog Pain Scale  Back Pain Scale 0-10: 4  Right leg pain: 0  Left leg pain: 0    PROMIS-10 Scores  Global Mental Health Score: (P) 12  Global Physical Health Score: (P) 14  PROMIS TOTAL - SUBSCORES: (P) 26    O>   Alert, oriented x 3, cooperative.  Not in CP distress.  LMP  (LMP Unknown)   Surgical incision (posterior midline thoracolumbar spine) well-healed, no sign of infection.  Ambulates independently using cane on right.  Wears [place] brace.  Grossly neurologically intact both upper and both lower extremities.    Imaging:    EOS full spine AP lateral standing x-rays taken today show bilateral segmental posterior instrumentation T8 to pelvis, with bilateral stacked pelvic screw fixation; quadruple janet construct.  5 level interbody cage placement L1-S1.  Overall, x-rays are very reassuring.  No sign of fixation loosening or failure.  No sign of proximal junctional failure.  The previously noted thoracolumbar junction kyphosis preoperatively is also much improved.    A>   52/f RHD with:  Lumbar spine:  1.  6 weeks s/p PISF T8 to pelvis; TLIF-SPO L1-L5 (5 levels) (11/30/2022), doing well, with improved preoperative pain symptoms.  2.  Smoking history x 35 yrs, quit in November 2021.  3.  Class II obesity (current BMI 35.7 on 8/16/22, decreased from 39.8 on 6/2/22).  4.  Osteopenia (DEXA 11/24/21; T-score = -1.1).  Cervical spine:  1.  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  2.  S/p ACDF C3-C5 (10/16/2017 [facility]).  3.  Chronic axial neck pain and R arm radicular pain (C6 and C8 distribution).    P>    Congratulated and reassured patient.  I am very happy that she could  already feel improvement of preoperative symptoms, including lower extremity numbness and pain.  She also feels that her posture is better.  I reassured her that at 6 weeks out, she can still look forward to further improvement of ongoing postsurgical symptoms.  Regarding her complaint of pain around her right periscapular region, I suspect this is related more to her cervical spine.  She had previous cervical spine fusion, that renal has gone on to pseudoarthrosis.  Her last cervical CT scan was from 2020, which also showed one of the screws to be broken.  At present, she complains of bothersome axial neck pain, as well as progressive right upper extremity radicular symptoms (C6 and C8 pattern).  Eventually, may consider revision cervical spine surgery for pseudoarthrosis repair.    - Internal PT order - for core stabilization, conditioning, LE strengthening.  Prefers to go to [facility].  May increase lifting restriction from 10 pounds to 20 pounds.  May also decrease brace wear to only when she would need to be doing activities, walking around, or going outside the house.  May also at this point start NSAIDs (e.g. ibuprofen) as needed.  Anticipate that she may be able to go back to driving at the 3-month osorio.  - Cervical MRI  - Cervical CT    RTC in 6 wks (3 mos postop) with rpt EOS full spine ap-lat and cervical flex-ext x-rays, also to review MRI and CT.  Eventually may consider revision c-spine surgery for pseudarthrosis repair.      Philip Wilhelm MD    Orthopaedic Spine Surgery  Dept Orthopaedic Surgery, [facility]  [phone] office, [phone] pager  [email or web address]

## 2023-01-17 DIAGNOSIS — Z87.39 HISTORY OF SERONEGATIVE INFLAMMATORY ARTHRITIS: ICD-10-CM

## 2023-01-18 ENCOUNTER — THERAPY VISIT (OUTPATIENT)
Dept: PHYSICAL THERAPY | Facility: CLINIC | Age: 53
End: 2023-01-18
Attending: ORTHOPAEDIC SURGERY
Payer: COMMERCIAL

## 2023-01-18 DIAGNOSIS — Z98.1 S/P SPINAL FUSION: ICD-10-CM

## 2023-01-18 PROCEDURE — 97161 PT EVAL LOW COMPLEX 20 MIN: CPT | Mod: GP | Performed by: PHYSICAL THERAPIST

## 2023-01-18 PROCEDURE — 97110 THERAPEUTIC EXERCISES: CPT | Mod: GP | Performed by: PHYSICAL THERAPIST

## 2023-01-18 NOTE — PROGRESS NOTES
[facility]    OUTPATIENT Physical Therapy ORTHOPEDIC EVALUATION  PLAN OF TREATMENT FOR OUTPATIENT REHABILITATION  (COMPLETE FOR INITIAL CLAIMS ONLY)  Patient's Last Name, First Name, M.I.  YOB: 1970  Zayra Ramirez    Provider s Name:  [facility]   Medical Record No.  [id number]   Start of Care Date:  01/18/23   Onset Date:  11/30/2311/30/23   Treatment Diagnosis:  Chronic lumbar pain, aftercare sp T8-pelvis spinal fusion Medical Diagnosis:  S/P spinal fusion       Goals:     01/18/23 0500   Body Part   Goals listed below are for lumbar   Goal #1   Goal #1 squatting/kneeling   Previous Functional Level No restrictions   Current Functional Level Can do a partial squat   Performance Level with poor form   STG Target Performance Do a partial squat   Performance Level with good form pain free   Rationale for proper body mechanics when lifting, personal hygiene, dressing;for proper body mechanics while performing yardwork and home maintenance;for proper body mechanics while performing housework;for job requirements in their work place   Due date 02/08/23   LTG Target Performance Do a full squat   Performance Level unrestricted pain free   Rationale for proper body mechanics when lifting, personal hygiene, dressing;for proper body mechanics while performing yardwork and home maintenance;for job requirements in their work place;for proper body mechanics while performing housework   Due date 04/12/23   Goal #2   Goal #2 ambulation   Previous Functional Level No restrictions   Current Functional Level Minutes patient can walk   Performance Level 15 minutes with brace on with 2/10 pain   STG Target Performance Minutes patient will be able to walk   Performance Level > 30 minutes with less than 2/10 pain   Rationale for safe household ambulation;for safe outdoor household  ambulation;for safe community ambulation;for safe work place ambulation;to maintain proper body mechanics/posture while ambulating to avoid additional compensatory injury due to improper gait mechanics;to promote a healthy and active lifestyle   Due Date 02/08/23    LTG Target Performance Hours patient will be able to walk   Performance Level unrestricted pain free   Rationale for safe community ambulation;for safe outdoor household ambulation;for safe household ambulation;for safe work place ambulation;to maintain proper body mechanics/posture while ambulating to avoid additional compensatory injury due to improper gait mechanics;to promote a healthy and active lifestyle   Due Date 04/12/23         Therapy Frequency:  1 x week  Predicted Duration of Therapy Intervention:  12 weeks    Willem Gustafson, PT                   I CERTIFY THE NEED FOR THESE SERVICES FURNISHED UNDER        THIS PLAN OF TREATMENT AND WHILE UNDER MY CARE     (Physician attestation of this document indicates review and certification of the therapy plan).                     Certification Date From:  01/18/23   Certification Date To:  04/12/23    Referring Provider:  Philip Wilhelm    Initial Assessment        See Epic Evaluation SOC Date: 01/18/23

## 2023-01-18 NOTE — PROGRESS NOTES
[facility] Initial Evaluation     Present: no    Subjective:  Zayra Ramirez is a 52 year old female with complaints of lumbar . Pt reports that she's had back pain for many years and finally had surgery on 11/30/22 (Fusion T8-pelvis).  No sharp pains since surgery and nerve pain has resolved. Denies vague symptoms. [facility] can take the brace off at home now while resting at home. Wants to get back to PLOF pain free. No BLT precautions present.      Symptoms commenced as a result of: chronic. Condition occurred in the following environment: chronic. Onset of symptoms: 11/30/22(DOS). Location of symptoms: surgical site . Pain level on number scale: 2/10. Quality of pain: aching. Associated symptoms: none. Pain frequency (constant/intermittent): intemittent. Symptoms are exacerbated by: walking, standing, certain positions. Symptoms are relieved by: rest/avoidance. Progression of symptoms since onset: improving. Imaging: yes see EPIC. Previous treatment: surgery. Response to previous treatment: yes. General health as reported by patient is fair. Pertinent medical history includes: See Epic. Medical allergies: see Epic. Other pertinent surgeries: see Epic. Current medications: See Epic. Occupation: works, . Work/restriction status: limited due to curre. Primary job tasks: daily tasks. Barriers at home/work: None reported by patient. Red flags: None reported by patient.    Objective  Posture: forward head, rounded shoulders    Gait: wide LIZ with brace on, forward lean    Screening: negative    Flexibility: limited assessment    Neurological:    Myotomes L R   L1-2 (hip flexion) 4/5 4/5   L3 (knee extension) 4/5 4/5   L4 (ankle DF) 4/5 4/5   L5 (g. toe ext) 4/5 4/5   S1 (ankle PF or knee flex) 4/5 4/5     Palpation: not assessment    Prone Assessment: not assessed due to fusion    Functional Squat and Balance: fair/poor squat, fair SLS alma    Other Tests: limited  assessment due to recent spinal fusion/precautions    Key Findings  Aftercare s/p lumbar fusion T8-pelvis with routine   Assessment/Plan:    Patient is a 52 year old female with lumbar complaints.    Patient has the following significant findings with corresponding treatment plan.                Diagnosis 1:  Lumbar pain, aftercare s/p lumbar fusion T8 to pelvis   Pain -  manual therapy, self management, education and home program  Decreased ROM/flexibility - manual therapy and therapeutic exercise  Decreased joint mobility - manual therapy and therapeutic exercise  Decreased strength - therapeutic exercise and therapeutic activities  Impaired muscle performance - neuro re-education  Decreased function - therapeutic activities  Impaired posture - neuro re-education    Therapy Evaluation Codes:     1) History comprised of:   Personal factors that impact the plan of care:      Social history/culture and Time since onset of symptoms.    Comorbidity factors that impact the plan of care are:      Asthma, Diabetes, High blood pressure, Overweight, Rheumatoid arthritis and Sleep disorder/apnea.     Medications impacting care: Anti-depressant, Anti-inflammatory, High blood pressure and Muscle relaxant.  2) Examination of Body Systems comprised of:   Body structures and functions that impact the plan of care:      Lumbar spine.   Activity limitations that impact the plan of care are:      Bathing, Bending, Cooking, Driving, Dressing, Lifting, Reading/Computer work, Sitting, Squatting/kneeling, Stairs, Standing, Walking, Working, Sleeping and Laying down.  3) Clinical presentation characteristics are:   Stable/Uncomplicated.  4) Decision-Making    Low complexity using standardized patient assessment instrument and/or measureable assessment of functional outcome.    Cumulative Therapy Evaluation is: Low complexity.    Previous and current functional limitations:  (See Goal Flow Sheet for this information)    Short term and  Long term goals: (See Goal Flow Sheet for this information)     Communication ability:  Patient appears to be able to clearly communicate and understand verbal and written communication and follow directions correctly.  Treatment Explanation - The following has been discussed with the patient:   RX ordered/plan of care  Anticipated outcomes  Possible risks and side effects  This patient would benefit from PT intervention to resume normal activities.   Rehab potential is good.    Frequency:  1 X week, once daily  Duration:  for 12 weeks  Discharge Plan:  Achieve all LTG.  Independent in home treatment program.  Reach maximal therapeutic benefit.    Please refer to the daily flowsheet for treatment today, total treatment time and time spent performing 1:1 timed codes.     Inquires  Willem Gustafson PT, DPT, CSCS  Physical Therapist  [facility]  [id number] [place] [address], [address] [address] [phone]

## 2023-01-20 NOTE — TELEPHONE ENCOUNTER
methotrexate sodium TABS 25 mg (Discontinued ....  The original prescription was discontinued on 7/29/2022 by Panchito Saenz MD.     Last Written Prescription Date:  06-  Last Fill Quantity: 108,   # refills:     Last Office Visit : 07-,  Plan:  1.  Continue methotrexate 10 tablets (25 mg) once weekly.While on methotrexate    Future Office visit:  03-    Routing refill request to provider for review/approval because:  Dosage is different:         LABS :   CBC w/d   Done on  12-  ALT      Done  12-  AST        Done  12-  ALB          Done  12-  CReat         Done  12-

## 2023-01-23 RX ORDER — METHOTREXATE 2.5 MG/1
TABLET ORAL
Qty: 120 TABLET | Refills: 1 | OUTPATIENT
Start: 2023-01-23

## 2023-01-23 NOTE — TELEPHONE ENCOUNTER
Medication: methorexate  -10 tablets (25 mg) once weekly.  Medication discontinued from [facility] around surgical procedure.  Next Office Visit:  03-      CBC RESULTS: Recent Labs   Lab Test 12/04/22  0707   WBC 10.9   RBC 2.34*   HGB 7.4*   HCT 22.4*   MCV 96   MCH 31.6   MCHC 33.0   RDW 13.4          Creatinine   Date Value Ref Range Status   12/01/2022 0.70 0.52 - 1.04 mg/dL Final   07/10/2021 1.01 0.52 - 1.04 mg/dL Final   ]    Liver Function Studies -   Recent Labs   Lab Test 12/01/22  0426   PROTTOTAL 5.5*   ALBUMIN 2.9*   BILITOTAL 0.3   ALKPHOS 60   *   ALT 57*       Routing refill request to provider for review/approval because:  Removed from the MAR- need to confirm with Dr. Dany Orlando, RN   1/23/2023 12:13 PM      EBONIE ChenN, RN  RN Care Coordinator Rheumatology

## 2023-01-23 NOTE — TELEPHONE ENCOUNTER
Call to patient to confirm her refill needs.  Patient has not re-started her methotrexate yet, Dr. Wilhelm has not given the approval.  Patient has remained on hydroxychloroquine as prescribed, 1 tablet bid and has refills.    All questions answered.    Yandy Orlando, EBONIEN, RN  RN Care Coordinator Rheumatology

## 2023-01-25 ENCOUNTER — VIRTUAL VISIT (OUTPATIENT)
Dept: SURGERY | Facility: CLINIC | Age: 53
End: 2023-01-25
Payer: COMMERCIAL

## 2023-01-25 DIAGNOSIS — E66.09 CLASS 1 OBESITY DUE TO EXCESS CALORIES WITHOUT SERIOUS COMORBIDITY WITH BODY MASS INDEX (BMI) OF 33.0 TO 33.9 IN ADULT: Primary | ICD-10-CM

## 2023-01-25 DIAGNOSIS — E66.811 CLASS 1 OBESITY DUE TO EXCESS CALORIES WITHOUT SERIOUS COMORBIDITY WITH BODY MASS INDEX (BMI) OF 33.0 TO 33.9 IN ADULT: Primary | ICD-10-CM

## 2023-01-25 DIAGNOSIS — Z71.3 NUTRITIONAL COUNSELING: ICD-10-CM

## 2023-01-25 DIAGNOSIS — E74.39 GLUCOSE INTOLERANCE: ICD-10-CM

## 2023-01-25 PROCEDURE — 97803 MED NUTRITION INDIV SUBSEQ: CPT | Performed by: DIETITIAN, REGISTERED

## 2023-01-25 NOTE — PROGRESS NOTES
Zayra Ramirez is a 53 year old who is being evaluated via a billable video visit.      How would you like to obtain your AVS? MyChart  If the video visit is dropped, the invitation should be resent by: Send to e-mail at: [email or web address]  Will anyone else be joining your video visit? No        Medical  Weight Loss Follow-Up Diet Evaluation  Assessment:  Zayra is presenting today for a follow up weight management nutrition consultation. Pt has had an initial appointment with Dr. Hong.  Weight loss medication: Saxenda.   Pt's weight is 196 lbs   Initial weight: 219 lbs  Weight change: 23 lbs (down)     No flowsheet data found.  BMI: There is no height or weight on file to calculate BMI.  Ideal body weight: 54.7 kg (120 lb 9.5 oz)  Adjusted ideal body weight: 68.4 kg (150 lb 12.1 oz)    Estimated RMR (Citrus-St Jeor equation):   1493 kcals x 1.3 (light active) = 1941 kcals (for weight maintenance)     Recommended Protein Intake: 60-80 grams of protein/day  Patient Active Problem List:  Patient Active Problem List   Diagnosis     Chronic bilateral low back pain without sciatica     Facial cellulitis     Class 2 severe obesity with serious comorbidity and body mass index (BMI) of 35.0 to 35.9 in adult, unspecified obesity type (H)     Dental abscess     Hypoxia     Subcutaneous emphysema (H)     Borderline personality disorder (H)     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)     Esophageal stricture     Obstruction of esophagus     HTN (hypertension)     Interstitial lung disease (H)     Moderate persistent asthma without complication     Onychomycosis     Restless leg syndrome     Seasonal allergies     Stress     Respiratory bronchiolitis interstitial lung disease (H)     Spinal epidural abscess     Lumbar spondylosis     Inflammatory arthritis     Arthralgia of temporomandibular joint     Articular disc disorder of temporomandibular joint     Derangement of temporomandibular joint     Calculus of  gallbladder without cholecystitis without obstruction     Displacement of articular disc of temporomandibular joint with reduction     Myofascial pain     Oral lesion     Symptomatic cholelithiasis     Sacro-iliac pain     Degenerative lumbar spinal stenosis     Glucose intolerance     Chronic neck pain     Lumbar radiculopathy, chronic     Cervical radiculopathy     Acute pain of right shoulder     Other osteoporosis without current pathological fracture     Diaphragmatic hernia     Bipolar 2 disorder (H)     Choking sensation     Gastro-esophageal reflux disease with esophagitis     Limited opening of mandible     History of pelvic surgery     Voiding dysfunction     Pelvic floor dysfunction     Urinary hesitancy     Urinary frequency     S/P spinal fusion     Diabetes: No     Progress on goals from last visit: Feels things are going well and is back on track since her recent back surgery.  Patient reports that she continues to focus on protein, however still needs to work on consistency at breakfast.     Dietary Recall:  Breakfast: eggs with hashbrowns (on occasion) or Smoothie   Lunch:berries or orange   Dinner:meat (chicken or beef), veggies (tends to do a larger quantity of), potato or rice  Typical snacks: recently potato chips or occasional nuts/peanuts or fruit (peeled apple or orange)  Beverages: water (4-6 bottles/day), Zero Sugar Mt Dew, occasional coffee, occasional juice   Exercise: PT exercises for her back (more core exercises)     Nutrition Diagnosis:    Overweight/Obesity (NC 3.3) related to overeating and poor lifestyle habits as evidenced by patient's subjective statements (h/o excessive energy intake, lack of exercise) and BMI of 33.7 kg/m2.       Intervention:  1. Food and/or nutrient delivery: balanced meals, adequate protein   2. Nutrition education: consistency of protein intake   3. Nutrition counseling: provided support and encouragement    Monitoring/Evaluation:    Goals:  1. Work on  consistency of protein at all meals, especially lunches.   2. Continue to work on increased exercise as able pending PT recommendations.     Patient to follow up in 1 week(s) with bariatrician and 1 month(s) with RD      Video-Visit Details    Type of service:  Video Visit    Video Start Time (time video started): 12:48 pm     Video End Time (time video stopped): 1:00 pm    Originating Location (pt. Location): Home        Distant Location (provider location):  Off-site    Mode of Communication:  Video Conference via [facility]    Physician has received verbal consent for a Video Visit from the patient? Yes      Saumya Dobbins RD

## 2023-01-25 NOTE — LETTER
1/25/2023         RE: Zayra Ramirez  [address] [place] [address]        Dear Colleague,    Thank you for referring your patient, Zayra Ramirez, to the [facility] [place]. Please see a copy of my visit note below.    Zayra Ramirez is a 53 year old who is being evaluated via a billable video visit.      How would you like to obtain your AVS? MyChart  If the video visit is dropped, the invitation should be resent by: Send to e-mail at: [email or web address]  Will anyone else be joining your video visit? No        Medical  Weight Loss Follow-Up Diet Evaluation  Assessment:  Zayra is presenting today for a follow up weight management nutrition consultation. Pt has had an initial appointment with Dr. Hong.  Weight loss medication: Saxenda.   Pt's weight is 196 lbs   Initial weight: 219 lbs  Weight change: 23 lbs (down)     No flowsheet data found.  BMI: There is no height or weight on file to calculate BMI.  Ideal body weight: 54.7 kg (120 lb 9.5 oz)  Adjusted ideal body weight: 68.4 kg (150 lb 12.1 oz)    Estimated RMR ([place]-St Jeor equation):   1493 kcals x 1.3 (light active) = 1941 kcals (for weight maintenance)     Recommended Protein Intake: 60-80 grams of protein/day  Patient Active Problem List:  Patient Active Problem List   Diagnosis     Chronic bilateral low back pain without sciatica     Facial cellulitis     Class 2 severe obesity with serious comorbidity and body mass index (BMI) of 35.0 to 35.9 in adult, unspecified obesity type (H)     Dental abscess     Hypoxia     Subcutaneous emphysema (H)     Borderline personality disorder (H)     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)     Esophageal stricture     Obstruction of esophagus     HTN (hypertension)     Interstitial lung disease (H)     Moderate persistent asthma without complication     Onychomycosis     Restless leg syndrome     Seasonal allergies     Stress      Respiratory bronchiolitis interstitial lung disease (H)     Spinal epidural abscess     Lumbar spondylosis     Inflammatory arthritis     Arthralgia of temporomandibular joint     Articular disc disorder of temporomandibular joint     Derangement of temporomandibular joint     Calculus of gallbladder without cholecystitis without obstruction     Displacement of articular disc of temporomandibular joint with reduction     Myofascial pain     Oral lesion     Symptomatic cholelithiasis     Sacro-iliac pain     Degenerative lumbar spinal stenosis     Glucose intolerance     Chronic neck pain     Lumbar radiculopathy, chronic     Cervical radiculopathy     Acute pain of right shoulder     Other osteoporosis without current pathological fracture     Diaphragmatic hernia     Bipolar 2 disorder (H)     Choking sensation     Gastro-esophageal reflux disease with esophagitis     Limited opening of mandible     History of pelvic surgery     Voiding dysfunction     Pelvic floor dysfunction     Urinary hesitancy     Urinary frequency     S/P spinal fusion     Diabetes: No     Progress on goals from last visit: Feels things are going well and is back on track since her recent back surgery.  Patient reports that she continues to focus on protein, however still needs to work on consistency at breakfast.     Dietary Recall:  Breakfast: eggs with hashbrowns (on occasion) or Smoothie   Lunch:berries or orange   Dinner:meat (chicken or beef), veggies (tends to do a larger quantity of), potato or rice  Typical snacks: recently potato chips or occasional nuts/peanuts or fruit (peeled apple or orange)  Beverages: water (4-6 bottles/day), Zero Sugar Mt Dew, occasional coffee, occasional juice   Exercise: PT exercises for her back (more core exercises)     Nutrition Diagnosis:    Overweight/Obesity (NC 3.3) related to overeating and poor lifestyle habits as evidenced by patient's subjective statements (h/o excessive energy intake, lack of  exercise) and BMI of 33.7 kg/m2.       Intervention:  1. Food and/or nutrient delivery: balanced meals, adequate protein   2. Nutrition education: consistency of protein intake   3. Nutrition counseling: provided support and encouragement    Monitoring/Evaluation:    Goals:  1. Work on consistency of protein at all meals, especially lunches.   2. Continue to work on increased exercise as able pending PT recommendations.     Patient to follow up in 1 week(s) with bariatrician and 1 month(s) with RD      Video-Visit Details    Type of service:  Video Visit    Video Start Time (time video started): 12:48 pm     Video End Time (time video stopped): 1:00 pm    Originating Location (pt. Location): Home        Distant Location (provider location):  Off-site    Mode of Communication:  Video Conference via [facility]    Physician has received verbal consent for a Video Visit from the patient? Yes      Saumya Dobbins RD            Again, thank you for allowing me to participate in the care of your patient.        Sincerely,        Saumya Dobbins RD

## 2023-01-26 ENCOUNTER — THERAPY VISIT (OUTPATIENT)
Dept: PHYSICAL THERAPY | Facility: CLINIC | Age: 53
End: 2023-01-26
Attending: ORTHOPAEDIC SURGERY
Payer: COMMERCIAL

## 2023-01-26 DIAGNOSIS — Z98.1 S/P SPINAL FUSION: Primary | ICD-10-CM

## 2023-01-26 DIAGNOSIS — M54.16 CHRONIC LUMBAR RADICULOPATHY: ICD-10-CM

## 2023-01-26 DIAGNOSIS — M54.50 LUMBAGO: ICD-10-CM

## 2023-01-26 PROCEDURE — 97110 THERAPEUTIC EXERCISES: CPT | Mod: GP | Performed by: PHYSICAL THERAPIST

## 2023-01-26 PROCEDURE — 97112 NEUROMUSCULAR REEDUCATION: CPT | Mod: GP | Performed by: PHYSICAL THERAPIST

## 2023-01-31 DIAGNOSIS — M54.2 CHRONIC NECK PAIN: ICD-10-CM

## 2023-01-31 DIAGNOSIS — G89.29 CHRONIC NECK PAIN: ICD-10-CM

## 2023-01-31 DIAGNOSIS — Z98.1 S/P SPINAL FUSION: ICD-10-CM

## 2023-01-31 DIAGNOSIS — M54.41 CHRONIC MIDLINE LOW BACK PAIN WITH RIGHT-SIDED SCIATICA: ICD-10-CM

## 2023-01-31 DIAGNOSIS — G89.29 CHRONIC MIDLINE LOW BACK PAIN WITH RIGHT-SIDED SCIATICA: ICD-10-CM

## 2023-01-31 DIAGNOSIS — M54.12 CERVICAL RADICULOPATHY: Primary | ICD-10-CM

## 2023-02-01 ASSESSMENT — ASTHMA QUESTIONNAIRES
QUESTION_5 LAST FOUR WEEKS HOW WOULD YOU RATE YOUR ASTHMA CONTROL: WELL CONTROLLED
ACT_TOTALSCORE: 17
QUESTION_1 LAST FOUR WEEKS HOW MUCH OF THE TIME DID YOUR ASTHMA KEEP YOU FROM GETTING AS MUCH DONE AT WORK, SCHOOL OR AT HOME: A LITTLE OF THE TIME
QUESTION_4 LAST FOUR WEEKS HOW OFTEN HAVE YOU USED YOUR RESCUE INHALER OR NEBULIZER MEDICATION (SUCH AS ALBUTEROL): ONE OR TWO TIMES PER DAY
ACT_TOTALSCORE: 17
QUESTION_2 LAST FOUR WEEKS HOW OFTEN HAVE YOU HAD SHORTNESS OF BREATH: THREE TO SIX TIMES A WEEK
QUESTION_3 LAST FOUR WEEKS HOW OFTEN DID YOUR ASTHMA SYMPTOMS (WHEEZING, COUGHING, SHORTNESS OF BREATH, CHEST TIGHTNESS OR PAIN) WAKE YOU UP AT NIGHT OR EARLIER THAN USUAL IN THE MORNING: ONCE OR TWICE

## 2023-02-02 ENCOUNTER — THERAPY VISIT (OUTPATIENT)
Dept: PHYSICAL THERAPY | Facility: CLINIC | Age: 53
End: 2023-02-02
Payer: COMMERCIAL

## 2023-02-02 DIAGNOSIS — M54.50 LUMBAGO: ICD-10-CM

## 2023-02-02 DIAGNOSIS — M54.16 CHRONIC LUMBAR RADICULOPATHY: ICD-10-CM

## 2023-02-02 DIAGNOSIS — Z98.1 S/P SPINAL FUSION: Primary | ICD-10-CM

## 2023-02-02 PROCEDURE — 97112 NEUROMUSCULAR REEDUCATION: CPT | Mod: GP | Performed by: PHYSICAL THERAPIST

## 2023-02-02 PROCEDURE — 97110 THERAPEUTIC EXERCISES: CPT | Mod: GP | Performed by: PHYSICAL THERAPIST

## 2023-02-02 RX ORDER — CELECOXIB 200 MG/1
200 CAPSULE ORAL EVERY MORNING
Qty: 60 CAPSULE | Refills: 2 | Status: SHIPPED | OUTPATIENT
Start: 2023-02-02 | End: 2023-08-23

## 2023-02-02 NOTE — TELEPHONE ENCOUNTER
CELECOXIB 200 MG CAPSULE  Last Written Prescription Date:  ?  Last Fill Quantity: /,   # refills: ?  Last Office Visit :  7/29/2022  Future Office visit:   3/2/2023    Routing refill request to provider for review/approval because:  Drug not active on patient's medication list      Lucie Ambriz RN  Central Triage Red Flags/Med Refills

## 2023-02-03 ENCOUNTER — HOSPITAL ENCOUNTER (OUTPATIENT)
Dept: MRI IMAGING | Facility: CLINIC | Age: 53
Discharge: HOME | End: 2023-02-03
Attending: ORTHOPAEDIC SURGERY
Payer: COMMERCIAL

## 2023-02-03 ENCOUNTER — VIRTUAL VISIT (OUTPATIENT)
Dept: FAMILY MEDICINE | Facility: CLINIC | Age: 53
End: 2023-02-03
Attending: FAMILY MEDICINE
Payer: COMMERCIAL

## 2023-02-03 ENCOUNTER — HOSPITAL ENCOUNTER (OUTPATIENT)
Dept: CT IMAGING | Facility: CLINIC | Age: 53
Discharge: HOME | End: 2023-02-03
Attending: ORTHOPAEDIC SURGERY
Payer: COMMERCIAL

## 2023-02-03 DIAGNOSIS — Z98.1 S/P SPINAL FUSION: ICD-10-CM

## 2023-02-03 DIAGNOSIS — I10 HYPERTENSION, UNSPECIFIED TYPE: ICD-10-CM

## 2023-02-03 DIAGNOSIS — S12.9XXS PSEUDOARTHROSIS OF CERVICAL SPINE, SEQUELA: ICD-10-CM

## 2023-02-03 DIAGNOSIS — M54.12 CERVICAL RADICULOPATHY: ICD-10-CM

## 2023-02-03 DIAGNOSIS — M54.50 CHRONIC BILATERAL LOW BACK PAIN WITHOUT SCIATICA: ICD-10-CM

## 2023-02-03 DIAGNOSIS — M54.2 CHRONIC NECK PAIN: ICD-10-CM

## 2023-02-03 DIAGNOSIS — K21.00 GASTROESOPHAGEAL REFLUX DISEASE WITH ESOPHAGITIS, UNSPECIFIED WHETHER HEMORRHAGE: ICD-10-CM

## 2023-02-03 DIAGNOSIS — F31.81 BIPOLAR 2 DISORDER (H): Primary | ICD-10-CM

## 2023-02-03 DIAGNOSIS — G89.29 CHRONIC BILATERAL LOW BACK PAIN WITHOUT SCIATICA: ICD-10-CM

## 2023-02-03 DIAGNOSIS — E66.812 CLASS 2 SEVERE OBESITY WITH SERIOUS COMORBIDITY AND BODY MASS INDEX (BMI) OF 35.0 TO 35.9 IN ADULT, UNSPECIFIED OBESITY TYPE (H): ICD-10-CM

## 2023-02-03 DIAGNOSIS — G89.29 CHRONIC NECK PAIN: ICD-10-CM

## 2023-02-03 DIAGNOSIS — E66.01 CLASS 2 SEVERE OBESITY WITH SERIOUS COMORBIDITY AND BODY MASS INDEX (BMI) OF 35.0 TO 35.9 IN ADULT, UNSPECIFIED OBESITY TYPE (H): ICD-10-CM

## 2023-02-03 DIAGNOSIS — M48.061 DEGENERATIVE LUMBAR SPINAL STENOSIS: ICD-10-CM

## 2023-02-03 PROCEDURE — 72125 CT NECK SPINE W/O DYE: CPT

## 2023-02-03 PROCEDURE — 72141 MRI NECK SPINE W/O DYE: CPT

## 2023-02-03 PROCEDURE — 99214 OFFICE O/P EST MOD 30 MIN: CPT | Mod: 95 | Performed by: FAMILY MEDICINE

## 2023-02-03 RX ORDER — POLYETHYLENE GLYCOL 3350 17 G/17G
POWDER, FOR SOLUTION ORAL
COMMUNITY
Start: 2022-12-06 | End: 2023-07-05

## 2023-02-03 RX ORDER — MONTELUKAST SODIUM 10 MG/1
1 TABLET ORAL AT BEDTIME
COMMUNITY
Start: 2023-01-31

## 2023-02-03 RX ORDER — SULFAMETHOXAZOLE/TRIMETHOPRIM 800-160 MG
TABLET ORAL
Status: ON HOLD | COMMUNITY
Start: 2023-01-06 | End: 2024-02-12

## 2023-02-03 RX ORDER — METHOTREXATE 2.5 MG/1
TABLET ORAL
Status: ON HOLD | COMMUNITY
Start: 2022-12-31 | End: 2024-02-12

## 2023-02-03 NOTE — PROGRESS NOTES
Zayra is a 53 year old who is being evaluated via a billable video visit.      How would you like to obtain your AVS? MyChart  If the video visit is dropped, the invitation should be resent by: Send to e-mail at: [email or web address]  Will anyone else be joining your video visit? No        Assessment & Plan   Problem List Items Addressed This Visit        Nervous and Auditory    Chronic bilateral low back pain without sciatica    Chronic neck pain       Digestive    Class 2 severe obesity with serious comorbidity and body mass index (BMI) of 35.0 to 35.9 in adult, unspecified obesity type (H)     BMI IMPROVING Body mass index is 37.59 kg/m , now down to 35.70 with Saxenda 3.0mg     SEVERE OBESITY : Initial bmi is Body mass index is 37.59 kg/m .  With the following weight related comorbid medical conditions: Hypertension, asthma, GERD, gallstones, degenerative spinal stenosis (surgeon suggested weight loss followed by surgery) and chronic back pain.  As well as she is a smoker.  And has mental illness     Contraception - had hysterectomy in 1997.      Patient declined 24 week weight loss program due to cost.      Bariatric surgery was discussed but she is not interested and she has severe mental health issues which may make this very difficult.     Medications started today: saxenda 3mg    Current goal(s): cutting down on mountain dew (switched from sugar to no sugar and decreased from 6 per day to 3 per day)    wants to rewire brain (see patient instructions) for compulsive eating and also referred [facility].      Follow up 1 month - 3 months     DISCUSSION _________________________________________________________________     Dx: Initial bmi is Body mass index is 37.59 kg/m .  With the following weight related comorbid medical conditions: Hypertension, asthma, GERD, gallstones, degenerative spinal stenosis (surgeon suggested weight loss followed by surgery) and chronic back  pain.  As well as she is a smoker.  And has mental illness     BECAUSE OF ABOVE PROBLEMS IT IS STRONGLY ADVISED THAT Zayra LOSE WEIGHT.  BELOW IS MY PLAN FOR her      Start weight 219 lbs, Body mass index is 37.59 kg/m .  7/26/2022   196 lbs 2/3/2023     Medication notes:   Medications she takes, that are known to increase weight:abilify, zyrtec, cymbalta, methotrexate, lyrica and flexeril.   Medications she takes, known to decrease weight: none     SAXENDA  7/26/2022  - 2/3/2023   - now taking 3 mg.     Metformin  -contraindicated due to A1c of 5.3 July 2022  Wellbutrin - Contraindicated due to psych polypharmacy  Naltrexone contraindicated due to chronic pain  Contrave -contraindicated due to psychiatric polypharmacy and chronic pain  Phentermine/ diethylproprion  -contraindicated due to psychiatric polypharmacy  Topamax - could consider if saxenda not covered.   Qsymia contraindicated due to psychiatric poly pharmacy.   Orlistat could consider.   Plenity could consider.      Potential barriers to weight loss identified thus far:   -She has suffered from excess weight since her teenage years.  -Mental health issues have played a role  -Weight positive medications have played a role  -Quitting smoking has played a role in her excess weight  -Eating the wrong types of food  -Lack of exercise - due to back pain  -Chronic back pain -keeps her from being more active  - Genetics, mother and father both overweight  -She is disabled.  -Uses white carbohydrates  -Eats most of her food at the end of the day  -She has had to eat at the food shelf at least a few times this year  -Craves sweets, candy, chocolate, cheeses  -Drinks Mountain Dew but trying to cut down - 9/6/2022 switched from regular mt dew zero.       Strengths that may help weight loss:  -She is a   -Likes vegetables  -Drinks water         Gastro-esophageal reflux disease with esophagitis    Relevant Medications    montelukast (SINGULAIR) 10 MG tablet  "      Circulatory    HTN (hypertension)       Behavioral    Bipolar 2 disorder (H) - Primary       Other    Degenerative lumbar spinal stenosis        BMI:   Estimated body mass index is 33.64 kg/m  as calculated from the following:    Height as of 12/22/22: 1.626 m (5' 4\").    Weight as of 1/10/23: 88.9 kg (196 lb).   Weight management plan: Discussed healthy diet and exercise guidelines     Follow-up Visit   Expected date:  Mar 03, 2023 (Approximate)      Follow Up Appointment Details:     Follow-up with whom?: Me    Follow-Up for what?: Chronic Disease f/u    Chronic Disease f/u: General (Other)    Additional Details: physician assisted weight loss.    How?: In Person or Virtual    Is this an as-needed follow-up?: Regina Hong MD  [facility] 53 [facility], presenting for the following health issues:  Weight Problem      History of Present Illness       Reason for visit:  W    She eats 4 or more servings of fruits and vegetables daily.She consumes 0 sweetened beverage(s) daily.She exercises with enough effort to increase her heart rate 10 to 19 minutes per day.  She exercises with enough effort to increase her heart rate 3 or less days per week.            Objective           Vitals:  No vitals were obtained today due to virtual visit.    Physical Exam   GENERAL: Healthy, alert and no distress  EYES: Eyes grossly normal to inspection.  No discharge or erythema, or obvious scleral/conjunctival abnormalities.  RESP: No audible wheeze, cough, or visible cyanosis.  No visible retractions or increased work of breathing.    SKIN: Visible skin clear. No significant rash, abnormal pigmentation or lesions.  NEURO: Cranial nerves grossly intact.  Mentation and speech appropriate for age.  PSYCH: Mentation appears normal, affect normal/bright, judgement and insight intact, normal speech and appearance well-groomed.          Video-Visit " Details    Type of service:  Video Visit   Video Start Time: 1:44 PM  Video End Time:2:00 PM    Originating Location (pt. Location): Home  Distant Location (provider location):  On-site  Platform used for Video Visit: Rosey

## 2023-02-03 NOTE — PATIENT INSTRUCTIONS
"4 STEPS TO OVERCOMING COMPULSIVE EATING   RECOGNIZE : COMPULSION \"this is a compulsion, I am not hungry\"  compulsive eating is an attempt for your body to make you feel better because of early life pains being improved (transiently) by eating.  Every time you feel like compulsively snacking due to boredom/ stress etc- say to yourself this is an erroneous pathway in my brain, it is not hunger, I do not need to eat. I need to feel better.    2.  ENVISION: disadvantageous neuronal pathway firing in the brain from discomfort to wanting to feel better with eating  3.  REWIRE: when you have compulsion distract yourself with something pleasurable (deep breathing, walking... etc) Find something healthy and that you like in order to replace that dopamine response in your brain (go for a walk). And do enjoyable thing for 15 min if possible to to move away from this.   4. NAME YOUR 'WHY' While doing this, Think about why you want to rewire your brain. Why do you want to change this behavior. What is the behavior doing that you don't like. How would it help if you decreased this compulsion?     Taken from:   Jennie Pan \"In the Realm of Hungry Ghosts\" - adapted from iLncoln Polk technique to treat OCD   "

## 2023-02-03 NOTE — ASSESSMENT & PLAN NOTE
BMI IMPROVING Body mass index is 37.59 kg/m , now down to 35.70 with Saxenda 3.0mg     SEVERE OBESITY : Initial bmi is Body mass index is 37.59 kg/m .  With the following weight related comorbid medical conditions: Hypertension, asthma, GERD, gallstones, degenerative spinal stenosis (surgeon suggested weight loss followed by surgery) and chronic back pain.  As well as she is a smoker.  And has mental illness     Contraception - had hysterectomy in 1997.      Patient declined 24 week weight loss program due to cost.      Bariatric surgery was discussed but she is not interested and she has severe mental health issues which may make this very difficult.     Medications started today: saxenda 3mg    Current goal(s): cutting down on mountain dew (switched from sugar to no sugar and decreased from 6 per day to 3 per day)    wants to rewire brain (see patient instructions) for compulsive eating and also referred for Eat disorder assessment at [facility].      Follow up 1 month - 3 months     DISCUSSION _________________________________________________________________     Dx: Initial bmi is Body mass index is 37.59 kg/m .  With the following weight related comorbid medical conditions: Hypertension, asthma, GERD, gallstones, degenerative spinal stenosis (surgeon suggested weight loss followed by surgery) and chronic back pain.  As well as she is a smoker.  And has mental illness     BECAUSE OF ABOVE PROBLEMS IT IS STRONGLY ADVISED THAT Zayra LOSE WEIGHT.  BELOW IS MY PLAN FOR her      Start weight 219 lbs, Body mass index is 37.59 kg/m .  7/26/2022   196 lbs 2/3/2023     Medication notes:   Medications she takes, that are known to increase weight:abilify, zyrtec, cymbalta, methotrexate, lyrica and flexeril.   Medications she takes, known to decrease weight: none     SAXENDA  7/26/2022  - 2/3/2023   - now taking 3 mg.     Metformin  -contraindicated due to A1c of 5.3 July 2022  Wellbutrin - Contraindicated due to psych  polypharmacy  Naltrexone contraindicated due to chronic pain  Contrave -contraindicated due to psychiatric polypharmacy and chronic pain  Phentermine/ diethylproprion  -contraindicated due to psychiatric polypharmacy  Topamax - could consider if saxenda not covered.   Qsymia contraindicated due to psychiatric poly pharmacy.   Orlistat could consider.   Plenity could consider.      Potential barriers to weight loss identified thus far:   -She has suffered from excess weight since her teenage years.  -Mental health issues have played a role  -Weight positive medications have played a role  -Quitting smoking has played a role in her excess weight  -Eating the wrong types of food  -Lack of exercise - due to back pain  -Chronic back pain -keeps her from being more active  - Genetics, mother and father both overweight  -She is disabled.  -Uses white carbohydrates  -Eats most of her food at the end of the day  -She has had to eat at the food shelf at least a few times this year  -Craves sweets, candy, chocolate, cheeses  -Drinks Mountain Dew but trying to cut down - 9/6/2022 switched from regular mt dew zero.       Strengths that may help weight loss:  -She is a   -Likes vegetables  -Drinks water

## 2023-02-07 DIAGNOSIS — E66.812 CLASS 2 SEVERE OBESITY WITH SERIOUS COMORBIDITY AND BODY MASS INDEX (BMI) OF 35.0 TO 35.9 IN ADULT, UNSPECIFIED OBESITY TYPE (H): ICD-10-CM

## 2023-02-07 DIAGNOSIS — E66.01 CLASS 2 SEVERE OBESITY WITH SERIOUS COMORBIDITY AND BODY MASS INDEX (BMI) OF 35.0 TO 35.9 IN ADULT, UNSPECIFIED OBESITY TYPE (H): ICD-10-CM

## 2023-02-07 NOTE — TELEPHONE ENCOUNTER
"Routing refill request to provider for review/approval because:  Labs not current:  A1c  Order for Saxenda with diagnosis of diabetes     Last Written Prescription Date: 12/8/22  Last Fill Quantity: 15ml, # refills: 1  Last office visit provider: 2/3/23    Requested Prescriptions   Pending Prescriptions Disp Refills     SAXENDA 18 MG/3ML pen [Pharmacy Med Name: SAXENDA 18 MG/3 ML PEN]  1     Sig: INJECT 3 MG SUBCUTANEOUS DAILY       GLP-1 Agonists Protocol Failed - 2/7/2023 12:40 AM        Failed - Order for Saxenda with diagnosis of diabetes        Failed - HgbA1C in past 3 or 6 months     If HgbA1C is 8 or greater, it needs to be on file within the past 3 months.  If less than 8, must be on file within the past 6 months.     Recent Labs   Lab Test 07/07/22  0846   A1C 5.3             Passed - Medication is active on med list        Passed - Patient is age 18 or older        Passed - No active pregnancy on record        Passed - Normal serum creatinine on file in past 12 months     Recent Labs   Lab Test 12/01/22  0426   CR 0.70       Ok to refill medication if creatinine is low          Passed - No positive pregnancy test in past 12 months        Passed - Recent (6 mo) or future (30 days) visit within the authorizing provider's specialty     Patient had office visit in the last 6 months or has a visit in the next 30 days with authorizing provider.  See \"Patient Info\" tab in inbasket, or \"Choose Columns\" in Meds & Orders section of the refill encounter.                 Marlon Mahoney RN 02/07/23 1:49 PM    "

## 2023-02-12 RX ORDER — LIRAGLUTIDE 6 MG/ML
INJECTION, SOLUTION SUBCUTANEOUS
Qty: 15 ML | Refills: 1 | Status: SHIPPED | OUTPATIENT
Start: 2023-02-12 | End: 2023-04-30

## 2023-02-14 ENCOUNTER — VIRTUAL VISIT (OUTPATIENT)
Dept: OTOLARYNGOLOGY | Facility: CLINIC | Age: 53
End: 2023-02-14
Payer: COMMERCIAL

## 2023-02-14 ENCOUNTER — TELEPHONE (OUTPATIENT)
Dept: OTOLARYNGOLOGY | Facility: CLINIC | Age: 53
End: 2023-02-14

## 2023-02-14 DIAGNOSIS — K12.1 STOMATITIS AND MUCOSITIS: Primary | ICD-10-CM

## 2023-02-14 DIAGNOSIS — K12.30 STOMATITIS AND MUCOSITIS: Primary | ICD-10-CM

## 2023-02-14 PROCEDURE — 99213 OFFICE O/P EST LOW 20 MIN: CPT | Mod: 95 | Performed by: OTOLARYNGOLOGY

## 2023-02-14 NOTE — TELEPHONE ENCOUNTER
Spoke with patient regarding scheduling Return in about 3 months (around 5/14/2023). Scheduled patient accordingly, patient stated she will see it in MyChart. Per patient.

## 2023-02-14 NOTE — PATIENT INSTRUCTIONS
"You were seen in the clinic today by Dr. Mijares. If you have any questions or concerns after your appointment, please call the clinic at [phone]. Press \"1\" for scheduling, press \"3\" for nurse advice.    2.   The following has been recommended for you based upon your appointment today:   -follow up in 3 months    Clarissa TRUJILLO, RN  [facility]  Department of Otolaryngology  [phone]     "

## 2023-02-14 NOTE — PROGRESS NOTES
Ms. Ramirez is here for followup today.  She has no other new complaints.  She says she is mostly doing okay.  She had invasive oral fungal infections.  She is getting by for the most part quite well.  She has no other current problems.  The mouth seems to be doing okay.  She says she uses topical agents as necessary.  We talked for about five minutes or so and in total, we looked at her old chart and her new chart.  We are going to see how she is doing and see her back in about six months.    15 minutes chart and visit time

## 2023-02-14 NOTE — LETTER
2/14/2023       RE: Zayra Ramirez  [address] [place] [address]     Dear Colleague,    Thank you for referring your patient, Zayra Ramirez, to the [facility] [place] [facility]. Please see a copy of my visit note below.    Ms. Ramirez is here for followup today.  She has no other new complaints.  She says she is mostly doing okay.  She had invasive oral fungal infections.  She is getting by for the most part quite well.  She has no other current problems.  The mouth seems to be doing okay.  She says she uses topical agents as necessary.  We talked for about five minutes or so and in total, we looked at her old chart and her new chart.  We are going to see how she is doing and see her back in about six months.    15 minutes chart and visit time       Again, thank you for allowing me to participate in the care of your patient.      Sincerely,    Morro Mijares MD

## 2023-02-15 ENCOUNTER — THERAPY VISIT (OUTPATIENT)
Dept: PHYSICAL THERAPY | Facility: CLINIC | Age: 53
End: 2023-02-15
Payer: COMMERCIAL

## 2023-02-15 DIAGNOSIS — M54.16 CHRONIC LUMBAR RADICULOPATHY: ICD-10-CM

## 2023-02-15 DIAGNOSIS — Z98.1 S/P SPINAL FUSION: Primary | ICD-10-CM

## 2023-02-15 DIAGNOSIS — M54.50 LUMBAGO: ICD-10-CM

## 2023-02-15 PROCEDURE — 97112 NEUROMUSCULAR REEDUCATION: CPT | Mod: GP | Performed by: PHYSICAL THERAPIST

## 2023-02-15 PROCEDURE — 97110 THERAPEUTIC EXERCISES: CPT | Mod: GP | Performed by: PHYSICAL THERAPIST

## 2023-02-15 NOTE — PROGRESS NOTES
PROGRESS  REPORT    Progress reporting period is from 1/18/23 to 2/15/23.       SUBJECTIVE  Subjective changes noted by patient:  Subjective: Zayra reports that things continue to improve, returns to Dr. Wilhelm tomorrow. Working on everything at home.    Current pain level is 1/10 Current Pain level: 1/10.     Previous pain level was  2/10 Initial Pain level: 2/10.   Changes in function:  Yes (See Goal flowsheet attached for changes in current functional level)  Adverse reaction to treatment or activity: None    OBJECTIVE  Changes noted in objective findings:  Yes, improved function, posture, gait, and balance  Objective: normal gait with brace on, fair SLS, improving exercise endurance and function.     ASSESSMENT/PLAN  Updated problem list and treatment plan: Diagnosis 1:  Lumbar pain, aftercare s/p lumbar fusion  Pain -  self management, education and home program  Decreased ROM/flexibility - manual therapy and therapeutic exercise  Decreased joint mobility - manual therapy and therapeutic exercise  Decreased strength - therapeutic exercise and therapeutic activities  Impaired balance - neuro re-education and therapeutic activities  Impaired muscle performance - neuro re-education  Decreased function - therapeutic activities  STG/LTGs have been met or progress has been made towards goals:  Yes (See Goal flow sheet completed today.)  Assessment of Progress: Patient is meeting short term goals and is progressing towards long term goals.  Self Management Plans:  Patient has been instructed in a home treatment program.  Patient  has been instructed in self management of symptoms.  I have re-evaluated this patient and find that the nature, scope, duration and intensity of the therapy is appropriate for the medical condition of the patient.  Zayra continues to require the following intervention to meet STG and LTG's:  PT    Recommendations:  This patient would benefit from continued therapy.     Frequency:  1 X  week, once daily  Duration:  for 8 weeks        Please refer to the daily flowsheet for treatment today, total treatment time and time spent performing 1:1 timed codes.    Inquires  Willem Gustafson PT, DPT, [facility]  Physical Therapist  [facility] [address]  [phone]

## 2023-02-16 ENCOUNTER — ANCILLARY PROCEDURE (OUTPATIENT)
Dept: GENERAL RADIOLOGY | Facility: CLINIC | Age: 53
End: 2023-02-16
Attending: ORTHOPAEDIC SURGERY
Payer: COMMERCIAL

## 2023-02-16 ENCOUNTER — OFFICE VISIT (OUTPATIENT)
Dept: ORTHOPEDICS | Facility: CLINIC | Age: 53
End: 2023-02-16
Payer: COMMERCIAL

## 2023-02-16 VITALS — WEIGHT: 201 LBS | HEIGHT: 64 IN | BODY MASS INDEX: 34.31 KG/M2

## 2023-02-16 DIAGNOSIS — S12.9XXS CERVICAL PSEUDOARTHROSIS, SEQUELA: ICD-10-CM

## 2023-02-16 DIAGNOSIS — Z98.1 S/P SPINAL FUSION: ICD-10-CM

## 2023-02-16 DIAGNOSIS — M47.812 ARTHROPATHY OF CERVICAL FACET JOINT: ICD-10-CM

## 2023-02-16 DIAGNOSIS — M54.12 CERVICAL RADICULOPATHY: Primary | ICD-10-CM

## 2023-02-16 DIAGNOSIS — M54.12 CERVICAL RADICULOPATHY: ICD-10-CM

## 2023-02-16 PROCEDURE — 99214 OFFICE O/P EST MOD 30 MIN: CPT | Mod: 24 | Performed by: ORTHOPAEDIC SURGERY

## 2023-02-16 PROCEDURE — 72082 X-RAY EXAM ENTIRE SPI 2/3 VW: CPT | Performed by: STUDENT IN AN ORGANIZED HEALTH CARE EDUCATION/TRAINING PROGRAM

## 2023-02-16 PROCEDURE — 72040 X-RAY EXAM NECK SPINE 2-3 VW: CPT | Performed by: RADIOLOGY

## 2023-02-16 NOTE — PROGRESS NOTES
Spine Surgical Hx:  10/16/2017 - ACDF with plate fixation C3-C5 (2 levels); use of Cornerstone allograft (Dr. Rylan Monique, [facility]).  [Implants: Medtronic Zevo plate].  08/08/2019 - SCS implantation ([place] OR); complicated by epidural abscess MSSA culture positive, treated with drainage/laminectomy and IV antibiotics.  Thus, the SCS was removed.  08/19/2019 - Laminectomies T11-L1 (T12-L2) ([facility]).  11/30/2022 - PISF T8-pelvis with bilateral stacked pelvic fixation; TLIF-SPO L1-S1 (5 levels); use of Infuse BMP and allograft (Choco/Shavon) for multilevel sponydylosis and stenosis, thoracolumbar junction kyphosis.  [Implants: Medtronic Solera, 5.5 mm CoCr rods x4 (UNID rods x2); Capstone Control PTC cages; SI-Bone Granite screws x 4].     BMD Hx:  11/24/21 - DEXA spine/hip/wrist.  T-score = -1.1 (Left femoral neck).  Imp: Osteopenia.  12/8/21 - Saw Dr. Guerra ([facility]).  P> RTC 6 wks; had not been seen since.      In-Person Visit    Chief Complaint   Patient presents with     RECHECK     Patient returns 11w 1d s/p posterior fusion T8- Sacrum/bilateral pelvic fixation.  New XR imaging today.  Cervical CT/MR 2/3/23.       S>  53 year old female, RHD, 2.5 mos postop; last seen at 6 wks.  Off opioids. Doing well at that time in regards to her lower back.      At her last visit, the patient reported R arm radicular sxs (all fingers but sparing the long finger); suggestive of C6/C8 pattern.  Today the patient states that the radicular pain has in the R arm improved, but she continues to have numbness in the C6/C8 distribution. She is also concerned about new weakness in the left arm, specifically with elbow flexion when the arm is fully extended. She noticed this when performing PT exercises. She was previously able to life 5 lbs, but has recently had difficulty with this.       Oswestry (KATIE) Questionnaire    OSWESTRY DISABILITY INDEX 2/13/2023   Count 8   Sum 18   Oswestry Score (%) 45   Some recent data might  "be hidden      KATIE preop          70%  6wk                    40%  3mo  45%      Neck Disability Index (NDI) Questionnaire    Neck Disability Index (NDI) 2/13/2023   Neck Disability Index: Count 8   NDI: Total Score = SUM (points for all 10 findings) 17   Neck Disability in Percent = (Total Score) / 50 * 100 42.5 (%)   Some recent data might be hidden      NDI 2/13/23 42.5%         PROMIS-10 Scores  Global Mental Health Score: (P) 14  Global Physical Health Score: (P) 12  PROMIS TOTAL - SUBSCORES: (P) 26    O>   Alert, oriented x 3, cooperative.  Not in CP distress.  Ht 1.62 m (5' 3.78\")   Wt 91.2 kg (201 lb)   LMP  (LMP Unknown)   BMI 34.74 kg/m    Surgical incision(s) well-healed, no sign of infection.  Ambulates independently.  Grossly neurologically intact.    Imaging:    Cervical CT and MRI from 2/3/23 reviewed.  Confirms interbody pseudarthrosis C3-4 and C4-5, with broken C3 anterior screws.  (+) destructive process with osteolysis left C2-3 facet joint.  (+) advanced facet arthropathy bilaterally across entire cervical spine.  (+) disc protrusion with mild anterolisthesis C2-3.    Cervical flexion-extension x-rays taken today show increased interspinous distance difference at C3-4 (3.0 mm), indicating motion and thus pseudoarthrosis.  At C4-5, the interspinous distance difference was only 0.7 mm; thus, not much motion demonstrated.    EOS full spine AP lateral standing x-rays taken today show stable bilateral segmental posterior instrumentation T8 to pelvis.  No sign of fixation loosening or failure.  Maintained acceptable coronal and sagittal alignment.  No evidence of proximal junctional problems.    A>   53/f RHD with:  Thoracolumbar spine:  1.  3 mos s/p PISF T8-pelvis; TLIF-SPO L1-S1 (5 levels), doing well, with improved posture and preoperative pain.  Cervical spine:  1.  Pseudarthrosis s/p ACDF C3-4 and C4-5 (2017, Dr. Monique), with broken C3 screws and lack of bridging bone on CT scan (2/3/23), " and motion demonstrated on flex-ext x-rays (2/16/23).  2.  Advanced multilevel cervical facet arthropathy C2-T1.   3.  Chronic axial neck pain.  4.  Bilateral radicular arm pain.    P>   Had good long discussion with patient.  I am very happy that she is progressing well almost 3 months after a T8 to sacrum fusion with bilateral pelvic fixation.  I reassured her regarding my clinical and radiographic findings.  X-rays show stable instrumentation, acceptable alignment, and no junctional pathology.  I encouraged her to continue physical therapy, wean off from the brace, and gradually start increasing activities as tolerated, taking a commonsense approach, and listening to her body.    Regarding her cervical spine, her radicular symptoms have shifted from the right side to the left side.  However, her more bothersome pain is really axial neck pain (70% neck, 30% arms).  This may be contributed to by both the pseudoarthrosis from her previous two-level ACDF, as well as multilevel advanced facet arthropathy (arthritis in her neck).  Explained to her that since she is now greater than 5 years out from surgery, the likelihood of spontaneous fusion given more time is very low.  Furthermore, her C3 screws have already broken, thus the strength of fixation is compromised.  At the same time, her neck is not grossly unstable, she does not have myelopathic signs and symptoms; and thus, the decision to undergo surgery in the form of pseudoarthrosis repair could be made electively based on quality of life issues.  The decision will have to be made whether to simply fused the previous operative levels C3-C5, or whether to perform a longer fusion in order to also address the multilevel facet arthropathy which may also be contributing to her axial neck pain.    At this point, I recommend nonoperative management, perhaps including medial branch blocks and radiofrequency ablation, in an effort to not only avoid surgery, but also to  delay need for surgery and give more time for her body to heal from her big spinal surgery.  I reached out to Dr. Janice Bolaños of the [facility] pain clinic, and she expressed willingness to see and help manage patient's cervical spine symptoms nonoperatively.    - Referral provided to [facility] Pain clinic w/ Dr. Janice Bolaños.  I have communicated with Dr. Bolaños via [employer] inbasket, and she expressed willingness to see patient.  Referral for nonop mgmt of chronic neck pain, including poss MBBs and RFAs as needed.  - Patient can discontinue brace use  - Continue PT  - Advance activities as tolerated    Return to clinic in 3 months (6 mos postop) w/ repeat EOS full spine AP/Lateral standing x-rays.   Of note, even though this visit was technically within the global period, the extent of evaluation discussion exceeded that of routine postoperative visit.  Specifically, even though her previous surgery was only in the thoracolumbar region, we spent most of the visit today also evaluating and talking about her cervical spine problems, which is a separate problem.  25 minutes spent on the date of the encounter doing chart review/review of outside records/review of test results/interpretation of tests/patient visit/documentation/discussion with other provider(s)/discussion with patient and family.    Philip Wilhelm MD    Orthopaedic Spine Surgery  Dept Orthopaedic Surgery, [facility]  [phone] office, [phone] pager  [email or web address]

## 2023-02-16 NOTE — LETTER
2/16/2023         RE: Zayra PADRON James  [address] [place] [address]        Dear Colleague,    Thank you for referring your patient, Zayra Ramirez, to the [facility] [place]. Please see a copy of my visit note below.    Spine Surgical Hx:  10/16/2017 - ACDF with plate fixation C3-C5 (2 levels); use of Cornerstone allograft (Dr. Rylan Monique, [facility]).  [Implants: Medtronic Zevo plate].  08/08/2019 - SCS implantation ([place] OR); complicated by epidural abscess MSSA culture positive, treated with drainage/laminectomy and IV antibiotics.  Thus, the SCS was removed.  08/19/2019 - Laminectomies T11-L1 (T12-L2) ([facility]).  11/30/2022 - PISF T8-pelvis with bilateral stacked pelvic fixation; TLIF-SPO L1-S1 (5 levels); use of Infuse BMP and allograft (Choco/Shavon) for multilevel sponydylosis and stenosis, thoracolumbar junction kyphosis.  [Implants: Medtronic Solera, 5.5 mm CoCr rods x4 (UNID rods x2); Capstone Control PTC cages; SI-Bone Granite screws x 4].     BMD Hx:  11/24/21 - DEXA spine/hip/wrist.  T-score = -1.1 (Left femoral neck).  Imp: Osteopenia.  12/8/21 - Saw Dr. Guerra ([facility]).  P> RTC 6 wks; had not been seen since.      In-Person Visit    Chief Complaint   Patient presents with     RECHECK     Patient returns 11w 1d s/p posterior fusion T8- Sacrum/bilateral pelvic fixation.  New XR imaging today.  Cervical CT/MR 2/3/23.       S>  53 year old female, RHD, 2.5 mos postop; last seen at 6 wks.  Off opioids. Doing well at that time in regards to her lower back.      At her last visit, the patient reported R arm radicular sxs (all fingers but sparing the long finger); suggestive of C6/C8 pattern.  Today the patient states that the radicular pain has in the R arm improved, but she continues to have numbness in the C6/C8 distribution. She is also concerned about new weakness in the left arm, specifically with elbow flexion when the arm is fully extended.  "She noticed this when performing PT exercises. She was previously able to life 5 lbs, but has recently had difficulty with this.       Oswestry (KATIE) Questionnaire    OSWESTRY DISABILITY INDEX 2/13/2023   Count 8   Sum 18   Oswestry Score (%) 45   Some recent data might be hidden      KATIE preop          70%  6wk                    40%  3mo  45%      Neck Disability Index (NDI) Questionnaire    Neck Disability Index (NDI) 2/13/2023   Neck Disability Index: Count 8   NDI: Total Score = SUM (points for all 10 findings) 17   Neck Disability in Percent = (Total Score) / 50 * 100 42.5 (%)   Some recent data might be hidden      NDI 2/13/23 42.5%         PROMIS-10 Scores  Global Mental Health Score: (P) 14  Global Physical Health Score: (P) 12  PROMIS TOTAL - SUBSCORES: (P) 26    O>   Alert, oriented x 3, cooperative.  Not in CP distress.  Ht 1.62 m (5' 3.78\")   Wt 91.2 kg (201 lb)   LMP  (LMP Unknown)   BMI 34.74 kg/m    Surgical incision(s) well-healed, no sign of infection.  Ambulates independently.  Grossly neurologically intact.    Imaging:    Cervical CT and MRI from 2/3/23 reviewed.  Confirms interbody pseudarthrosis C3-4 and C4-5, with broken C3 anterior screws.  (+) destructive process with osteolysis left C2-3 facet joint.  (+) advanced facet arthropathy bilaterally across entire cervical spine.  (+) disc protrusion with mild anterolisthesis C2-3.    Cervical flexion-extension x-rays taken today show increased interspinous distance difference at C3-4 (3.0 mm), indicating motion and thus pseudoarthrosis.  At C4-5, the interspinous distance difference was only 0.7 mm; thus, not much motion demonstrated.    EOS full spine AP lateral standing x-rays taken today show stable bilateral segmental posterior instrumentation T8 to pelvis.  No sign of fixation loosening or failure.  Maintained acceptable coronal and sagittal alignment.  No evidence of proximal junctional problems.    A>   53/f RHD with:  Thoracolumbar " spine:  1.  3 mos s/p PISF T8-pelvis; TLIF-SPO L1-S1 (5 levels), doing well, with improved posture and preoperative pain.  Cervical spine:  1.  Pseudarthrosis s/p ACDF C3-4 and C4-5 (2017, Dr. Monique), with broken C3 screws and lack of bridging bone on CT scan (2/3/23), and motion demonstrated on flex-ext x-rays (2/16/23).  2.  Advanced multilevel cervical facet arthropathy C2-T1.   3.  Chronic axial neck pain.  4.  Bilateral radicular arm pain.    P>   Had good long discussion with patient.  I am very happy that she is progressing well almost 3 months after a T8 to sacrum fusion with bilateral pelvic fixation.  I reassured her regarding my clinical and radiographic findings.  X-rays show stable instrumentation, acceptable alignment, and no junctional pathology.  I encouraged her to continue physical therapy, wean off from the brace, and gradually start increasing activities as tolerated, taking a commonsense approach, and listening to her body.    Regarding her cervical spine, her radicular symptoms have shifted from the right side to the left side.  However, her more bothersome pain is really axial neck pain (70% neck, 30% arms).  This may be contributed to by both the pseudoarthrosis from her previous two-level ACDF, as well as multilevel advanced facet arthropathy (arthritis in her neck).  Explained to her that since she is now greater than 5 years out from surgery, the likelihood of spontaneous fusion given more time is very low.  Furthermore, her C3 screws have already broken, thus the strength of fixation is compromised.  At the same time, her neck is not grossly unstable, she does not have myelopathic signs and symptoms; and thus, the decision to undergo surgery in the form of pseudoarthrosis repair could be made electively based on quality of life issues.  The decision will have to be made whether to simply fused the previous operative levels C3-C5, or whether to perform a longer fusion in order to also  address the multilevel facet arthropathy which may also be contributing to her axial neck pain.    At this point, I recommend nonoperative management, perhaps including medial branch blocks and radiofrequency ablation, in an effort to not only avoid surgery, but also to delay need for surgery and give more time for her body to heal from her big spinal surgery.  I reached out to Dr. Janice Bolaños of the [facility] pain clinic, and she expressed willingness to see and help manage patient's cervical spine symptoms nonoperatively.    - Referral provided to [facility] Pain clinic w/ Dr. Janice Bolaños.  I have communicated with Dr. Bolaños via [employer] inbasket, and she expressed willingness to see patient.  Referral for nonop mgmt of chronic neck pain, including poss MBBs and RFAs as needed.  - Patient can discontinue brace use  - Continue PT  - Advance activities as tolerated    Return to clinic in 3 months (6 mos postop) w/ repeat EOS full spine AP/Lateral standing x-rays.   Of note, even though this visit was technically within the global period, the extent of evaluation discussion exceeded that of routine postoperative visit.  Specifically, even though her previous surgery was only in the thoracolumbar region, we spent most of the visit today also evaluating and talking about her cervical spine problems, which is a separate problem.  25 minutes spent on the date of the encounter doing chart review/review of outside records/review of test results/interpretation of tests/patient visit/documentation/discussion with other provider(s)/discussion with patient and family.    Philip Wilhelm MD    Orthopaedic Spine Surgery  Dept Orthopaedic Surgery, [facility]  [phone] office, [phone] pager  [email or web address]

## 2023-02-20 ENCOUNTER — TELEPHONE (OUTPATIENT)
Dept: INTERNAL MEDICINE | Facility: CLINIC | Age: 53
End: 2023-02-20
Payer: COMMERCIAL

## 2023-02-20 NOTE — TELEPHONE ENCOUNTER
[facility] Call Center    Phone Message    May a detailed message be left on voicemail: yes     Reason for Call: Medication Question or concern regarding medication   Prescription Clarification  Name of Medication: Lisinopril HCTZ  Prescribing Provider: Neena Tam   Pharmacy: [facility] [id number] [place] [address]   What on the order needs clarification? Margarita calling from [facility] requesting information on patients lisinopril. Patient is trying to refill it.       Action Taken: Message routed to:  [facility] ([facility] p[facility]    Travel Screening: Not Applicable

## 2023-02-20 NOTE — TELEPHONE ENCOUNTER
Called Margarita- this med was discontinued in the hospital due to low bp's, we should get her in for hosp follow and labs.     Jimmie Woods RN on 2/20/2023 at 3:55 PM

## 2023-02-21 ENCOUNTER — THERAPY VISIT (OUTPATIENT)
Dept: PHYSICAL THERAPY | Facility: CLINIC | Age: 53
End: 2023-02-21
Payer: COMMERCIAL

## 2023-02-21 DIAGNOSIS — M54.16 CHRONIC LUMBAR RADICULOPATHY: ICD-10-CM

## 2023-02-21 DIAGNOSIS — M54.50 LUMBAGO: ICD-10-CM

## 2023-02-21 DIAGNOSIS — Z98.1 S/P SPINAL FUSION: Primary | ICD-10-CM

## 2023-02-21 PROCEDURE — 97110 THERAPEUTIC EXERCISES: CPT | Mod: GP | Performed by: PHYSICAL THERAPIST

## 2023-02-27 DIAGNOSIS — G25.81 RESTLESS LEG SYNDROME: ICD-10-CM

## 2023-02-28 ENCOUNTER — THERAPY VISIT (OUTPATIENT)
Dept: PHYSICAL THERAPY | Facility: CLINIC | Age: 53
End: 2023-02-28
Payer: COMMERCIAL

## 2023-02-28 DIAGNOSIS — Z98.1 S/P SPINAL FUSION: Primary | ICD-10-CM

## 2023-02-28 DIAGNOSIS — M54.16 CHRONIC LUMBAR RADICULOPATHY: ICD-10-CM

## 2023-02-28 DIAGNOSIS — M54.50 LUMBAGO: ICD-10-CM

## 2023-02-28 PROCEDURE — 97110 THERAPEUTIC EXERCISES: CPT | Mod: GP | Performed by: PHYSICAL THERAPIST

## 2023-02-28 RX ORDER — PREGABALIN 150 MG/1
CAPSULE ORAL
Qty: 60 CAPSULE | Refills: 3 | Status: SHIPPED | OUTPATIENT
Start: 2023-02-28 | End: 2023-07-26

## 2023-02-28 NOTE — PROGRESS NOTES
[facility]  Rheumatology Clinic  Panchito Saenz MD  2023     Name: Zayra Ramirez  MRN: [id number]  Age: 53 year old  : 1970  Referring provider: Aime Mason    Assessment and Plan:  # Seronegative inflammatory arthritis:  Former daily joint pain in knees and small joints of the hands and feet has dissipated significantly.  Exam shows no inflammatory changes in knuckles fingers toes or mid feet.    Blood work in 2022 showed AST and ALT elevated at 110, and 57 respectively.  CBC showed hemoglobin of 7.4 with a platelet count of 231,000.     Discussion: Despite switching from methotrexate to hydroxychloroquine in 2022, and recognition of planned spinal fusion surgery with bone grafting, control of inflammatory arthritis symptoms remains much improved, with morning stiffness of 1 to 2 hours duration the only lingering symptom of active inflammatory arthritis.  We discussed my impression that hydroxychloroquine may be a safer choice than methotrexate with its lack of immunosuppressive capacity, and no toxicity to the liver or to postsurgical healing.  I recommend remaining off methotrexate for now and continuing hydroxychloroquine.  Combination therapy with anti-TNF could be contemplated for recurrence of inflammatory polyarthritis.    # Degenerative arthritis, neck and lumbar spine: Chronic low back pain is markedly improved after lumbar fusion surgery in 2022 by Dr. Krishnan.  Discussions are ongoing regarding demonstrated degenerative disc disease in the cervical spine and possible surgical solutions for intermittent radicular pain.     # Impingement, now right greater than left shoulder:  Right greater than left shoulder range of motion remains restricted, but active forward flexion is now present to 120 degrees in both shoulders without pain.  Passive external and internal rotation is much improved.     Data:  Crystal examination on right shoulder synovial fluid  revealed CPPD crystals in July 2022.  MRI 7-13 showed, among other findings, supraspinatus tendon increased signal on the right.    Shoulder pain has improved significantly with physical therapy.  I recommend continuing nonpharmacologic methods and as needed ibuprofen/acetaminophen for occasional low moderate pain.  Treatment of CPPD, if it recurs, would include as needed anti-inflammatory therapy with systemic corticosteroids, intra-articular injection, or nonsteroidals.    # Tobacco abuse:   Patient quit smoking in early 2022    # LFT increase: Because of mild transaminase elevation in early December 2022 is not clear.  Methotrexate had been off for approximately 4 weeks at that blood draw.  I recommend rechecking LFTs today.        Follow-up:     Plan:  1.  Continue off methotrexate, but on hydroxychloroquine 200 mg twice a day.  While using hydroxychloroquine, undergo annual examination of the retina for rare retinal toxicity.  First ophthalmology visit may occur in late 2023.  2.  Continue daily Physical therapy for shoulder range of motion, and back motion preservation.  3.  Recheck liver function, kidney function, and inflammation today.  4.  Use acetaminophen up to 1000 mg 3 times a day, and intermittent ibuprofen 400 to 600 mg 3 times a day for residual joint pain.  5.  For recurrent pseudogout attacks, 10 to 14-day courses of tapering prednisone or Medrol may be helpful to alleviate severity of symptoms.  Contact rheumatology for recurrent single joint flaring symptoms.    Panchito Saenz MD  Staff Rheumatologist, [facility]:   Zayra Ramirez follows up for seronegative inflammatory arthritis and L shoulder pain.  She was last seen 7-2022 when seronegative inflammatory arthritis was stable. Recommendation was to continue methotrexate monotherapy at 25 mg once weekly, and to use Celebrex for residual joint pain.    Interval history February 28, 2023    Patient was seen by Dr. Wilhelm in  "orthopedics on February 16, 2023 in follow-up of spinal fusion surgery performed in November 2022.  Impression was of satisfactory postoperative recovery, off opioids.  Doing well with regards to her lower back.  Continued right sided radicular arm pain was noted.  Concern was for left-sided cervical neuritic symptoms, potentially related to compressive neuropathy.  Recommendation was to continue with nonoperative management and have consultation with pain provider.    She stopped methotrexate before back surgery and started hydroxychloroquine 200 mg tiwce daily. She has continued with hydroxychloroquine.   Today she is doing good with respect to small joint pain.  Her shoulders feel \"tight\" but less painful than last year, comfortable; she does use aceta/ibu each day.  She stil has 1-2 hours of early morning stiffness in small joints, but she is not disabled by the stiffness. Helped by Ptx.    Interval history July 29, 2022    Patient underwent left subacromial injection in rheumatology clinic on Yessenia 3, 2022. She noted signifidcant improvement, with ability to use the L shoulder.     Patient was hospitalized at [facility] from July 13 through July 15, 2022 for pseudogout flareup, and rheumatoid arthritis.  History was of sudden worsening of right shoulder pain on July 12, associated with elevated inflammatory markers and leukocytosis.  Shoulder aspiration was performed in the emergency room and fluid analysis revealed CPPD crystals; she underwent shoulder injection with dramatic improvement, and she was discharged on celebrex.    Since discharge, the R shoulder has been better, able to tolerate gardening and other ADL.   Using acetaminophen 3 500 mg twice daily.    She is doing Ptx both for her shoulders and for her back. She is doing exercises at home daily for the R shoulder.     Interval history June 2, 2022    She still has waxing/waning pain in both shoulders, knees, hips. Sieges of pain start in the " "mornings, \"I can't even get out of bed due to severe shoulder/elbow pain\". Oftentimes it takes an hour to get out of bed, and a total of 2 hours of early morning stiffness is common.  Former pain affecting fingers wrists and toes has lessened somewhat.    Chronic back/neck pain is still a problem. She has seen Dr. Wilhelm, who has discussed low back surgery pending a weight change.     She is still on methotrexate 10 tabs weekly (since 2-2022). She does not think it helps.  She has used several course of prendionse and 2 courses of medrol, last dose 2 weeks ago.   She is \"at least 50%\" better during steroid tapers; symptoms come back within 10 days of stopping. Not using ibuprofen.     Review of Systems:   Pertinent items are noted in HPI or as below, remainder of complete ROS is negative.      No recent problems with hearing or vision. No swallowing problems.   No breathing difficulty, shortness of breath, coughing, or wheezing.  No chest pain or palpitations.  No heart burn, indigestion, abdominal pain, nausea, vomiting, diarrhea.  No urination problems, no bloody, cloudy urine, no dysuria.  No numbing, tingling, weakness.  No headaches or confusion.  No rashes. No easy bleeding or bruising.     Active Medications:   Current Outpatient Medications   Medication Sig     acetaminophen (TYLENOL) 325 MG tablet Take 2 tablets (650 mg) by mouth every 4 hours as needed for pain     albuterol (PROAIR HFA/PROVENTIL HFA/VENTOLIN HFA) 108 (90 Base) MCG/ACT inhaler Inhale 2 puffs into the lungs every 6 hours as needed for shortness of breath / dyspnea or wheezing Ventolin please     ARIPiprazole (ABILIFY) 5 MG tablet Take 5 mg by mouth daily     Black Pepper-Turmeric (TURMERIC CURCUMIN) 5-1000 MG CAPS Take 1 capsule by mouth daily     busPIRone HCl (BUSPAR) 30 MG tablet Take 30 mg by mouth 2 times daily      carboxymethylcellulose (CARBOXYMETHYLCELLULOSE SODIUM) 0.5 % SOLN ophthalmic solution Place 1 drop into both eyes 3 " times daily as needed for dry eyes     celecoxib (CELEBREX) 200 MG capsule Take 1 capsule (200 mg) by mouth every morning     diazepam (VALIUM) 5 MG tablet Take 1 tab as directed by MRI staff. May repeat Once as needed     DULoxetine (CYMBALTA) 60 MG capsule Take 120 mg by mouth At Bedtime      EPINEPHrine (EPIPEN/ADRENACLICK/OR ANY BX GENERIC EQUIV) 0.3 MG/0.3ML injection 2-pack Inject 0.3 mg into the muscle as needed     ferrous fumarate 65 mg, [place]. FE,-Vitamin C 125 mg (VITRON C)  MG TABS tablet Take 1 tablet by mouth daily     folic acid (FOLVITE) 1 MG tablet Take 1 tablet (1 mg) by mouth At Bedtime Skips Sunday nights     hydroxychloroquine (PLAQUENIL) 200 MG tablet Take 1 tablet (200 mg) by mouth 2 times daily     hydrOXYzine (ATARAX) 25 MG tablet Take 1 tablet (25 mg) by mouth every 6 hours as needed for other (adjuvant pain)     insulin pen needle (32G X 4 MM) 32G X 4 MM miscellaneous Use 1 NEEDLE WEEKLY     insulin pen needle (32G X 4 MM) 32G X 4 MM miscellaneous Use 1 NEEDLE daily to use with Saxenda     insulin pen needle (32G X 6 MM) 32G X 6 MM miscellaneous Use daily pen needles daily or as directed.     montelukast (SINGULAIR) 10 MG tablet Take 1 tablet by mouth At Bedtime     montelukast (SINGULAIR) 10 MG tablet Take 10 mg by mouth daily     omeprazole (PRILOSEC) 40 MG DR capsule Take 1 capsule (40 mg) by mouth daily     oxyCODONE (ROXICODONE) 5 MG tablet Take 1 tablet (5 mg) by mouth every 12 hours as needed for severe pain (7-10) (wean off as pain improves)     OXYGEN-HELIUM IN 3L @ night    Oxygen only not helium     polyethylene glycol (MIRALAX) 17 g packet Take 17 g by mouth daily     polyethylene glycol (MIRALAX) 17 GM/Dose powder      pregabalin (LYRICA) 150 MG capsule Take 1 capsule by mouth in the morning and 1 capsule around 1 PM     Respiratory Therapy Supplies (Formerly [facility] CPAP FILTER) MISC      rOPINIRole (REQUIP) 0.5 MG tablet Take 1 tablet (0.5 mg) by mouth At Bedtime Take  1 tab by mouth at bedtime.     SAXENDA 18 MG/3ML pen INJECT 3 MG SUBCUTANEOUS DAILY     senna-docusate (SENOKOT-S/PERICOLACE) 8.6-50 MG tablet Take 1 tablet by mouth 2 times daily     sulfamethoxazole-trimethoprim (BACTRIM DS) 800-160 MG tablet TAKE 1 TABLET BY MOUTH EVERY DAY AS ONE DOSE     tiZANidine (ZANAFLEX) 2 MG tablet Take 1 tablet (2 mg) by mouth 3 times daily as needed for muscle spasms     vitamin D3 (CHOLECALCIFEROL) 250 mcg (20040 units) capsule Take 1 capsule by mouth daily     vitamin E (TOCOPHEROL) 1000 units (450 mg) CAPS capsule Take 1,000 Units by mouth daily     methotrexate sodium 2.5 MG TABS TAKE 9 TABLETS (22.5 MG) BY MOUTH ONCE A WEEK (Patient not taking: Reported on 3/2/2023)     Current Facility-Administered Medications   Medication     romosozumab-aqqg (EVENITY) injection 210 mg     Facility-Administered Medications Ordered in Other Visits   Medication     Lidocaine 1 % injection 0.5-5 mL     sodium chloride (PF) 0.9% PF flush 5-50 mL           Allergies:   Bee Venom       Bees      Doxycycline       Erythromycin     Hydrocodone-Acetaminophen                 Past Medical History:  Remarkable for moderate, persistent asthma, hypertension, bipolar II disorder, interstitial lung disease, cervical stenosis with myelopathy 2017.       Chronic midline low back pain  Facial cellulitis  Morbid (severe) obesity due to excess calories  Dental abscess  Hypoxia  Subcutaneous emphysema  Borderline personality disorder  Stenosis of cervical spine with myelopathy  Esophageal stricture  Gastroesophageal reflux disease   Hypertension   Interstitial lung disease  Moderate persistent asthma without complication  Onychomycosis  Restless leg syndrome  Seasonal allergies  Smoker  Stress  Respiratory bronchiolitis interstitial lung disease     Past Surgical History:  Remarkable for surgical fusion  Hysterectomy 1997  rectocele and Cystocele surgery  Cholecystectomy     Family History:    The patient's family  "history includes Asthma in her mother; Back Pain in her father; Hypertension in her father; Other Cancer in her father.    Social History:  The patient reports that she has been smoking cigarettes, around 0.5 packs per day. She started smoking about 23 years ago. She has a 30.00 pack-year smoking history. She has never used smokeless tobacco. She reports that she does not drink alcohol or use drugs.   PCP: Adam Del Toro  Marital Status: Single     Physical Exam:   /72 (BP Location: Left arm, Patient Position: Sitting, Cuff Size: Adult Large)   Pulse 82   Ht 1.626 m (5' 4\")   Wt 90.5 kg (199 lb 8 oz)   LMP  (LMP Unknown)   SpO2 96%   BMI 34.24 kg/m     Wt Readings from Last 4 Encounters:   03/02/23 90.5 kg (199 lb 8 oz)   02/16/23 91.2 kg (201 lb)   01/10/23 88.9 kg (196 lb)   12/22/22 87.1 kg (192 lb)     Constitutional: Well-developed, appearing stated age; cooperative.  Eyes: Normal EOM, PERRLA, vision, conjunctiva, sclera.  ENT: Normal external ears, nose, hearing, lips, teeth, gums, throat. No mucous membrane lesions.  Neck: No mass or thyroid enlargement.  Resp: Breathing unlabored  MS: Painless forward elevation 220 degrees in both shoulders.  No tenderness with shoulder palpation.  Passive external and internal rotation are normal..Fist formation intact, normal wrist ROM.  No visible swelling at MCPs or PIPs.  MTPs nontender.  Skin: No nail pitting, alopecia, rash, nodules or lesions.  Neuro: Normal cranial nerves  Psych: Normal judgement, orientation, memory, affect.    Laboratory:   RHEUM RESULTS Latest Ref Rng & Units 12/1/2022 12/3/2022 12/4/2022   ALBUMIN 3.4 - 5.0 g/dL 2.9(L) - -   ALBUMIN (R) 3.5 - 5.2 g/dL - - -   ALT 0 - 50 U/L 57(H) - -   AST 0 - 45 U/L 110(H) - -   CREATININE 0.52 - 1.04 mg/dL 0.70 - -   CRP 0.0 - 8.0 mg/L - - -   C-REACTIVE PROTEIN, INFLAMMATION (R) <5.00 mg/L - - -   GFR ESTIMATE, IF BLACK >60 mL/min/[1.73:m2] - - -   GFR ESTIMATE >60 mL/min/1.73m2 >90 - - "   HEMATOCRIT 35.0 - 47.0 % 31.9(L) 23.1(L) 22.4(L)   HEMOGLOBIN 11.7 - 15.7 g/dL 10.7(L) 7.6(L) 7.4(L)   WBC 4.0 - 11.0 10e3/uL 14.5(H) 11.7(H) 10.9   RBC 3.80 - 5.20 10e6/uL 3.36(L) 2.39(L) 2.34(L)   RDW 10.0 - 15.0 % 14.0 13.4 13.4   MCHC 31.5 - 36.5 g/dL 33.5 32.9 33.0   MCV 78 - 100 fL 95 97 96   PLATELET COUNT 150 - 450 10e3/uL 264 199 231   ESR 0 - 30 mm/hr - - -

## 2023-03-01 ENCOUNTER — VIRTUAL VISIT (OUTPATIENT)
Dept: SURGERY | Facility: CLINIC | Age: 53
End: 2023-03-01
Payer: COMMERCIAL

## 2023-03-01 ENCOUNTER — MYC MEDICAL ADVICE (OUTPATIENT)
Dept: FAMILY MEDICINE | Facility: CLINIC | Age: 53
End: 2023-03-01

## 2023-03-01 ENCOUNTER — TELEPHONE (OUTPATIENT)
Dept: FAMILY MEDICINE | Facility: CLINIC | Age: 53
End: 2023-03-01

## 2023-03-01 DIAGNOSIS — E66.09 CLASS 1 OBESITY DUE TO EXCESS CALORIES WITHOUT SERIOUS COMORBIDITY WITH BODY MASS INDEX (BMI) OF 34.0 TO 34.9 IN ADULT: ICD-10-CM

## 2023-03-01 DIAGNOSIS — E66.811 CLASS 1 OBESITY DUE TO EXCESS CALORIES WITHOUT SERIOUS COMORBIDITY WITH BODY MASS INDEX (BMI) OF 34.0 TO 34.9 IN ADULT: ICD-10-CM

## 2023-03-01 DIAGNOSIS — Z71.3 NUTRITIONAL COUNSELING: ICD-10-CM

## 2023-03-01 DIAGNOSIS — E74.39 GLUCOSE INTOLERANCE: Primary | ICD-10-CM

## 2023-03-01 PROCEDURE — 97803 MED NUTRITION INDIV SUBSEQ: CPT | Mod: VID | Performed by: DIETITIAN, REGISTERED

## 2023-03-01 NOTE — PROGRESS NOTES
Zayra Ramirez is a 53 year old who is being evaluated via a billable video visit.      How would you like to obtain your AVS? MyChart  If the video visit is dropped, the invitation should be resent by: Send to e-mail at: [email or web address]  Will anyone else be joining your video visit? No        Medical  Weight Loss Follow-Up Diet Evaluation  Assessment:  Zayra is presenting today for a follow up weight management nutrition consultation. Pt has had an initial appointment with Dr. Hong.   Weight loss medication: Saxenda.-not covered by insurance, has 2 weeks left from previous prescription   Pt's weight is 200 lbs  Initial weight: 219 lbs  Weight change: 19 lbs (down), up 4 lbs in the past month    No flowsheet data found.  BMI: There is no height or weight on file to calculate BMI.  Ideal body weight: 54.2 kg (119 lb 7.6 oz)  Adjusted ideal body weight: 69 kg (152 lb 1.4 oz)    Estimated RMR ([place]-St Jeor equation):   1503 kcals x 1.3 (light active) = 1954 kcals (for weight maintenance)     Recommended Protein Intake: 60-80 grams of protein/day  Patient Active Problem List:  Patient Active Problem List   Diagnosis     Chronic bilateral low back pain without sciatica     Facial cellulitis     Class 2 severe obesity with serious comorbidity and body mass index (BMI) of 35.0 to 35.9 in adult, unspecified obesity type (H)     Dental abscess     Hypoxia     Subcutaneous emphysema (H)     Borderline personality disorder (H)     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)     Esophageal stricture     Obstruction of esophagus     HTN (hypertension)     Interstitial lung disease (H)     Moderate persistent asthma without complication     Onychomycosis     Restless leg syndrome     Seasonal allergies     Stress     Respiratory bronchiolitis interstitial lung disease (H)     Spinal epidural abscess     Lumbar spondylosis     Inflammatory arthritis     Arthralgia of temporomandibular joint     Articular disc  disorder of temporomandibular joint     Derangement of temporomandibular joint     Calculus of gallbladder without cholecystitis without obstruction     Displacement of articular disc of temporomandibular joint with reduction     Myofascial pain     Oral lesion     Symptomatic cholelithiasis     Sacro-iliac pain     Degenerative lumbar spinal stenosis     Glucose intolerance     Chronic neck pain     Lumbar radiculopathy, chronic     Cervical radiculopathy     Acute pain of right shoulder     Other osteoporosis without current pathological fracture     Diaphragmatic hernia     Bipolar 2 disorder (H)     Choking sensation     Gastro-esophageal reflux disease with esophagitis     Limited opening of mandible     History of pelvic surgery     Voiding dysfunction     Pelvic floor dysfunction     Urinary hesitancy     Urinary frequency     S/P spinal fusion     Progress on goals from last visit: Did note that she had some emotional eating patterns happening more recently.   Patient reports that she feels she is back on track with healthy eating, starting this past week.     Dietary Recall:  Breakfast: eggs   Lunch:trying to work on consistency of this meal, aiming for something protein based -almonds, fruit for example  Dinner:chicken, vegetables, potato or rice or pasta (smaller portions)  Typical snacks: fruit or nuts   Beverages: Water/Flavored Water-at least 64 oz/day, Diet Mt Dew-no more than 3 times/day, Coffee, Tea  Exercise: PT exercises-going for longer stretches and doing them more frequently (aiming for at least 15 minutes)    Nutrition Diagnosis:    Overweight/Obesity (NC 3.3) related to overeating and poor lifestyle habits as evidenced by patient's subjective statements (h/o excessive energy intake, lack of exercise) and BMI of 34.6 kg/m2.       Intervention:  1. Food and/or nutrient delivery: balanced meals, adequate protein   2. Nutrition education: protein intake  3. Nutrition counseling: provided support  and encouragement    Monitoring/Evaluation:    Goals:  1. Continue to work on consistency of eating 3 meals/day, focusing on high protein food sources at each meal.  2. Start incorporating some outside walking once the weather improves.     Patient to follow up in 2 month(s)  with DANIEL        Video-Visit Details    Type of service:  Video Visit    Video Start Time (time video started): 2:01 pm    Video End Time (time video stopped): 2:13 pm    Originating Location (pt. Location): Home        Distant Location (provider location):  Off-site    Mode of Communication:  Video Conference via [facility]    Physician has received verbal consent for a Video Visit from the patient? Yes      Saumya Dobbins, DANIEL

## 2023-03-01 NOTE — LETTER
3/1/2023         RE: Zayra Ramirez  [address] [place] [address]        Dear Colleague,    Thank you for referring your patient, Zayra Ramirez, to the [facility] [place]. Please see a copy of my visit note below.    Zayra Ramirez is a 53 year old who is being evaluated via a billable video visit.      How would you like to obtain your AVS? MyChart  If the video visit is dropped, the invitation should be resent by: Send to e-mail at: [email or web address]  Will anyone else be joining your video visit? No        Medical  Weight Loss Follow-Up Diet Evaluation  Assessment:  Zayra is presenting today for a follow up weight management nutrition consultation. Pt has had an initial appointment with Dr. Hong.   Weight loss medication: Saxenda.-not covered by insurance, has 2 weeks left from previous prescription   Pt's weight is 200 lbs  Initial weight: 219 lbs  Weight change: 19 lbs (down), up 4 lbs in the past month    No flowsheet data found.  BMI: There is no height or weight on file to calculate BMI.  Ideal body weight: 54.2 kg (119 lb 7.6 oz)  Adjusted ideal body weight: 69 kg (152 lb 1.4 oz)    Estimated RMR ([place]-St Jeor equation):   1503 kcals x 1.3 (light active) = 1954 kcals (for weight maintenance)     Recommended Protein Intake: 60-80 grams of protein/day  Patient Active Problem List:  Patient Active Problem List   Diagnosis     Chronic bilateral low back pain without sciatica     Facial cellulitis     Class 2 severe obesity with serious comorbidity and body mass index (BMI) of 35.0 to 35.9 in adult, unspecified obesity type (H)     Dental abscess     Hypoxia     Subcutaneous emphysema (H)     Borderline personality disorder (H)     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)     Esophageal stricture     Obstruction of esophagus     HTN (hypertension)     Interstitial lung disease (H)     Moderate persistent asthma without  complication     Onychomycosis     Restless leg syndrome     Seasonal allergies     Stress     Respiratory bronchiolitis interstitial lung disease (H)     Spinal epidural abscess     Lumbar spondylosis     Inflammatory arthritis     Arthralgia of temporomandibular joint     Articular disc disorder of temporomandibular joint     Derangement of temporomandibular joint     Calculus of gallbladder without cholecystitis without obstruction     Displacement of articular disc of temporomandibular joint with reduction     Myofascial pain     Oral lesion     Symptomatic cholelithiasis     Sacro-iliac pain     Degenerative lumbar spinal stenosis     Glucose intolerance     Chronic neck pain     Lumbar radiculopathy, chronic     Cervical radiculopathy     Acute pain of right shoulder     Other osteoporosis without current pathological fracture     Diaphragmatic hernia     Bipolar 2 disorder (H)     Choking sensation     Gastro-esophageal reflux disease with esophagitis     Limited opening of mandible     History of pelvic surgery     Voiding dysfunction     Pelvic floor dysfunction     Urinary hesitancy     Urinary frequency     S/P spinal fusion     Progress on goals from last visit: Did note that she had some emotional eating patterns happening more recently.   Patient reports that she feels she is back on track with healthy eating, starting this past week.     Dietary Recall:  Breakfast: eggs   Lunch:trying to work on consistency of this meal, aiming for something protein based -almonds, fruit for example  Dinner:chicken, vegetables, potato or rice or pasta (smaller portions)  Typical snacks: fruit or nuts   Beverages: Water/Flavored Water-at least 64 oz/day, Diet Mt Dew-no more than 3 times/day, Coffee, Tea  Exercise: PT exercises-going for longer stretches and doing them more frequently (aiming for at least 15 minutes)    Nutrition Diagnosis:    Overweight/Obesity (NC 3.3) related to overeating and poor lifestyle habits  as evidenced by patient's subjective statements (h/o excessive energy intake, lack of exercise) and BMI of 34.6 kg/m2.       Intervention:  1. Food and/or nutrient delivery: balanced meals, adequate protein   2. Nutrition education: protein intake  3. Nutrition counseling: provided support and encouragement    Monitoring/Evaluation:    Goals:  1. Continue to work on consistency of eating 3 meals/day, focusing on high protein food sources at each meal.  2. Start incorporating some outside walking once the weather improves.     Patient to follow up in 2 month(s)  with RD        Video-Visit Details    Type of service:  Video Visit    Video Start Time (time video started): 2:01 pm    Video End Time (time video stopped): 2:13 pm    Originating Location (pt. Location): Home        Distant Location (provider location):  Off-site    Mode of Communication:  Video Conference via [facility]    Physician has received verbal consent for a Video Visit from the patient? Yes      Saumya Dobbins RD            Again, thank you for allowing me to participate in the care of your patient.        Sincerely,        Saumya Dbobins RD

## 2023-03-01 NOTE — TELEPHONE ENCOUNTER
Prior Authorization Request  Who s requesting:  Patient  Pharmacy Name and Location: [facility] [id number]  Medication Name: SAXENDA 18 MG/3ML pen  Insurance Plan: [facility]  Insurance Member ID Number:  [id number]  CoverMyMeds Key:   Informed patient that prior authorizations can take up to 10 business days for response:   Yes  Okay to leave a detailed message: Yes     Route to pool:  [facility]

## 2023-03-02 ENCOUNTER — OFFICE VISIT (OUTPATIENT)
Dept: RHEUMATOLOGY | Facility: CLINIC | Age: 53
End: 2023-03-02
Payer: COMMERCIAL

## 2023-03-02 VITALS
HEART RATE: 82 BPM | HEIGHT: 64 IN | SYSTOLIC BLOOD PRESSURE: 110 MMHG | OXYGEN SATURATION: 96 % | WEIGHT: 199.5 LBS | BODY MASS INDEX: 34.06 KG/M2 | DIASTOLIC BLOOD PRESSURE: 72 MMHG

## 2023-03-02 DIAGNOSIS — Z87.39 HISTORY OF SERONEGATIVE INFLAMMATORY ARTHRITIS: Primary | ICD-10-CM

## 2023-03-02 PROCEDURE — 99214 OFFICE O/P EST MOD 30 MIN: CPT | Performed by: INTERNAL MEDICINE

## 2023-03-02 RX ORDER — HYDROXYCHLOROQUINE SULFATE 200 MG/1
200 TABLET, FILM COATED ORAL 2 TIMES DAILY
Qty: 60 TABLET | Refills: 7 | Status: SHIPPED | OUTPATIENT
Start: 2023-03-02 | End: 2023-10-12

## 2023-03-02 ASSESSMENT — PAIN SCALES - GENERAL: PAINLEVEL: NO PAIN (0)

## 2023-03-02 NOTE — NURSING NOTE
"Chief Complaint   Patient presents with     RECHECK     High risk medication use +3 more       Vitals:    03/02/23 1255   BP: 110/72   BP Location: Left arm   Patient Position: Sitting   Cuff Size: Adult Large   Pulse: 82   SpO2: 96%   Weight: 90.5 kg (199 lb 8 oz)   Height: 1.626 m (5' 4\")       Body mass index is 34.24 kg/m .    Leny Leal, SUNDAYF  "

## 2023-03-02 NOTE — PATIENT INSTRUCTIONS
"Hospitalist Progress Note.    LOS: 6 days    Patient Care Team:  Rodriguez Eng DO as PCP - General (Family Medicine)  Babatunde Montes PA-C as PCP - Claims Attributed    Chief Complaint:    F/u seizure disorder, on mechanical ventilation     Subjective   Patient seen and examined this morning. She remains intubated and on mechanical ventilation as there has been no significant improvement in her mentation. She seems to be withdrawing to painful stimulus but continues to have some jerking movent of her right upper extremity which appears to be myoclonus. There has been no overnight events. She's on minimal ventilator setting. She's been off propofol for 48 hours.      Review of Systems:    The pertinent  ROS was done and it is noted above, rest  was negative.    Objective     Vital Signs  /46   Pulse 72   Temp 99.5 °F (37.5 °C) (Oral)   Resp 16   Ht 162.6 cm (64\")   Wt 62.8 kg (138 lb 6.4 oz)   LMP  (LMP Unknown)   SpO2 100%   BMI 23.76 kg/m²       No intake/output data recorded.    Intake/Output Summary (Last 24 hours) at 10/01/18 1229  Last data filed at 10/01/18 0500   Gross per 24 hour   Intake             1733 ml   Output                0 ml   Net             1733 ml       Physical Exam:    General Appearance: stuporous but off sedation, on mechanical ventilation, no acute distress  HEENT: pupils round and sluggishly reactive to light, oral mucosa dry, extraocular movements intact. + ET tube, + NG tube   Neck: supple, no JVD, trachea midline, + IJ central line   Lungs: Clear to Auscultation, unlabored breathing effort  Heart: RRR, normal S1 and S2, no S3, no rub  Abdomen: soft, non-tender, no palpable bladder, present bowel sounds to auscultation  Extremities: no edema, cyanosis or clubbing. Intermittent jerking movement of RUE, muscle contractions noted with withdrawing from pain in bilateral lower extremities  Neuro: stuporous, grossly non focal.     Results Review:      Results from last " Diagnosis:  1.  Inflammatory arthritis: Small joint symptoms in the hands and feet remain improved.  I recommend remaining off methotrexate, and on hydroxychloroquine to treat inflammatory arthritis.  2.  Rotator cuff tendinitis, right shoulder: Range of motion is improved, but still limited today.  I recommend ongoing physical therapy and follow-up with orthopedics/sports medicine.  3.  Neck and low back pain: Low back pain much improved after fusion surgery in November 2022.  4.  Pseudogout, right shoulder: No recurrence of inflammatory symptoms.    Plan:    1.  Continue off methotrexate, but on hydroxychloroquine 200 mg twice a day.  While using hydroxychloroquine, undergo annual examination of the retina for rare retinal toxicity.  First ophthalmology visit may occur in late 2023.  2.  Continue daily Physical therapy for shoulder range of motion, and back motion preservation.  3.  Recheck liver function, kidney function, and inflammation today.  4.  Use acetaminophen up to 1000 mg 3 times a day, and intermittent ibuprofen 400 to 600 mg 3 times a day for residual joint pain.     7 days  Lab Units 09/29/18  0413 09/28/18  0650 09/27/18  0420 09/26/18  0406   SODIUM mmol/L 131* 131* 129* 135*   POTASSIUM mmol/L 4.5 3.8 3.9 3.3*   CHLORIDE mmol/L 100 100 98 101   CO2 mmol/L 20.0* 23.0* 22.0* 24.0*   BUN mg/dL 37* 26* 35* 26*   CREATININE mg/dL 4.60* 3.70* 5.20* 4.40*   CALCIUM mg/dL 8.1* 8.3* 7.7* 8.1*   BILIRUBIN mg/dL 0.4 0.2  --  0.5   ALK PHOS U/L 107 111  --  74   ALT (SGPT) U/L 32 33  --  33   AST (SGOT) U/L 34 24  --  59*   GLUCOSE mg/dL 173* 170* 131* 87       Estimated Creatinine Clearance: 9.7 mL/min (A) (by C-G formula based on SCr of 4.6 mg/dL (H)).      Results from last 7 days  Lab Units 09/29/18  0413 09/28/18  0650 09/26/18  0406 09/25/18  0110   MAGNESIUM mg/dL  --   --  2.2 2.3   PHOSPHORUS mg/dL 5.3* 4.2  --  4.8*               Results from last 7 days  Lab Units 09/29/18  0413 09/28/18  0650 09/26/18  0406 09/25/18  0606 09/25/18  0110   WBC 10*3/mm3 9.23 6.61 11.23* 13.79* 8.73   HEMOGLOBIN g/dL 11.2* 10.6* 11.2* 11.5* 12.4   PLATELETS 10*3/mm3 156 134 152 148 164         Results from last 7 days  Lab Units 09/26/18  1105   INR  1.03         Imaging Results (last 24 hours)     ** No results found for the last 24 hours. **          amLODIPine 5 mg Oral Q24H   aspirin 81 mg Oral Daily   atorvastatin 10 mg Oral Daily   b complex-vitamin c-folic acid 1 tablet Oral Daily   budesonide 0.5 mg Nebulization BID - RT   carvedilol 12.5 mg Oral Q12H   chlorhexidine 15 mL Mouth/Throat Q12H   famotidine 20 mg Intravenous Daily   heparin (porcine) 5,000 Units Subcutaneous Q8H   insulin detemir 10 Units Subcutaneous Q12H   insulin regular 0-9 Units Subcutaneous Q6H   ipratropium-albuterol 3 mL Nebulization Q6H - RT   levothyroxine 100 mcg Oral QAM   losartan 50 mg Oral Daily   phenytoin 100 mg Intravenous Q6H   sevelamer 800 mg Oral TID With Meals   Vitamin D 1,000 Units Oral Daily       sodium chloride 10 mL/hr Last Rate: 10 mL/hr (09/30/18 1257)       Medication Review:   Current  Facility-Administered Medications   Medication Dose Route Frequency Provider Last Rate Last Dose   • amLODIPine (NORVASC) tablet 5 mg  5 mg Oral Q24H Bridger Swift MD   5 mg at 10/01/18 0902   • aspirin EC tablet 81 mg  81 mg Oral Daily Jordan Ribera MD   81 mg at 10/01/18 0902   • atorvastatin (LIPITOR) tablet 10 mg  10 mg Oral Daily Jordan Ribera MD   10 mg at 10/01/18 0902   • b complex-vitamin c-folic acid (NEPHRO-CLEOPATRA) tablet 1 tablet  1 tablet Oral Daily Jordan Ribera MD   1 tablet at 10/01/18 0859   • budesonide (PULMICORT) nebulizer solution 0.5 mg  0.5 mg Nebulization BID - RT Jordan Ribera MD   0.5 mg at 10/01/18 0650   • carvedilol (COREG) tablet 12.5 mg  12.5 mg Oral Q12H Bridger Swift MD   12.5 mg at 10/01/18 0901   • CHAPSTICK 1 each  1 each Apply externally Q2H PRN Jordan Ribera MD       • chlorhexidine (PERIDEX) 0.12 % solution 15 mL  15 mL Mouth/Throat Q12H Jordan Ribera MD   15 mL at 10/01/18 0859   • dextrose (D50W) 25 g/ 50mL Intravenous Solution 25 g  25 g Intravenous Q15 Min PRN Jordan Ribera MD   25 g at 10/01/18 0516   • dextrose (GLUTOSE) oral gel 1 tube  1 tube Oral Q15 Min PRN Jordan Ribera MD       • famotidine (PEPCID) injection 20 mg  20 mg Intravenous Daily Sharonda Bowens MD   20 mg at 10/01/18 0859   • glucagon (human recombinant) (GLUCAGEN DIAGNOSTIC) injection 1 mg  1 mg Subcutaneous PRN Jordan Ribera MD       • heparin (porcine) 5000 UNIT/ML injection 5,000 Units  5,000 Units Subcutaneous Q8H Sharonda Bowens MD   5,000 Units at 10/01/18 0516   • Influenza Vac Subunit Quad (FLUCELVAX) injection 0.5 mL  0.5 mL Intramuscular During Hospitalization Jordan Ribera MD       • insulin detemir (LEVEMIR) injection 10 Units  10 Units Subcutaneous Q12H Jordan Ribera MD   10 Units at 10/01/18 0858   • insulin regular (humuLIN R,novoLIN R) injection 0-9 Units  0-9 Units Subcutaneous Q6H Jordan Ribera MD   2 Units at 09/30/18 1303   • ipratropium-albuterol (DUO-NEB)  nebulizer solution 3 mL  3 mL Nebulization Q6H - RT Jordan Ribera MD   3 mL at 10/01/18 0650   • labetalol (NORMODYNE,TRANDATE) injection 10 mg  10 mg Intravenous Q4H PRN Sharonda Bowens MD   10 mg at 09/25/18 0541   • levothyroxine (SYNTHROID, LEVOTHROID) tablet 100 mcg  100 mcg Oral QAM Jordan Ribera MD   100 mcg at 10/01/18 0600   • losartan (COZAAR) tablet 50 mg  50 mg Oral Daily Jordan Ribera MD   50 mg at 10/01/18 0900   • ondansetron (ZOFRAN) tablet 4 mg  4 mg Oral Q6H PRN Sharonda Bowens MD        Or   • ondansetron ODT (ZOFRAN-ODT) disintegrating tablet 4 mg  4 mg Oral Q6H PRN Sharonda Bowens MD        Or   • ondansetron (ZOFRAN) injection 4 mg  4 mg Intravenous Q6H PRN Sharonda Bowens MD       • phenytoin (DILANTIN) injection 100 mg  100 mg Intravenous Q6H Jordan Ribera MD   100 mg at 10/01/18 0900   • pneumococcal polysaccharide 23-valent (PNEUMOVAX-23) vaccine 0.5 mL  0.5 mL Intramuscular During Hospitalization Jordan Ribera MD       • sevelamer (RENAGEL) tablet 800 mg  800 mg Oral TID With Meals Jordan Ribera MD   800 mg at 10/01/18 0859   • sodium chloride 0.9 % flush 1-10 mL  1-10 mL Intravenous PRN Sharonda Bowens MD       • sodium chloride 0.9 % infusion  10 mL/hr Intravenous Continuous Jordan Ribera MD 10 mL/hr at 09/30/18 1257 10 mL/hr at 09/30/18 1257   • Vitamin D 1,000 Units  1,000 Units Oral Daily Jordan Ribera MD   1,000 Units at 10/01/18 0902     Principal Problem:    Hyperglycemic hyperosmolar nonketotic coma (CMS/HCC)  Active Problems:    Essential hypertension    Acquired hypothyroidism    Sick sinus syndrome (CMS/HCC)    Seizure (CMS/HCC)    Acute metabolic encephalopathy    Diabetes mellitus type 2 in nonobese (CMS/HCC)    End-stage renal disease on hemodialysis (CMS/HCC)    Hypertensive urgency    Assessment/Plan:   1.  Acute metabolic encephalopathy, present on admission.  2.  New onset complex partial seizures with postictal state, present on admission.  3.  Hyperglycemic  hyperosmolar nonketotic coma, present on admission, resolved  4.  Acute non-ST elevation myocardial infarction, type II myocardial infarction.  5.  Pseudohyponatremia, improving.  6.  End-stage renal disease on hemodialysis.  7.  Diabetes mellitus type 2 with nephropathy.  8.  Hypertensive urgency, present on admission.  9.  Chronic diastolic heart failure.  10.  Acquired hypothyroidism.  11.  Sick sinus syndrome status post pacemaker.  12.  COPD    Patient remains obtunded and does not waking up despite being off any sedation for atleast 48 hours. It is possible that her renal failure could be contributing to decreased clearance of medications causing it to linger further in her circulation. It is possible that during patient's seizure episodes, patient suffered anoxic encephalopathy.  I did speak to Dr. Zavala about the patient's care this afternoon. He felt that due to her prolonged seizure episodes, there is high likelihood that she suffered significant anoxic brain injury, however, he did feel that based on the first EEG that she has potential to recover. He recommended that we repeat an EEG to see where her brain activity is currently and he will re-evaluate her tomorrow morning.     In the meantime, will continue with mechanical ventilation.   Continue all other medications as before.   She will be continued on HD as scheduled.   Her prognosis remains guarded.     I discussed the details with the nursing staff.    Rest as ordered.    Sharonda Bowens MD  10/01/18  12:29 PM

## 2023-03-03 ENCOUNTER — LAB (OUTPATIENT)
Dept: LAB | Facility: CLINIC | Age: 53
End: 2023-03-03
Payer: COMMERCIAL

## 2023-03-03 DIAGNOSIS — Z87.39 HISTORY OF SERONEGATIVE INFLAMMATORY ARTHRITIS: ICD-10-CM

## 2023-03-03 LAB
ALBUMIN SERPL BCG-MCNC: 4.1 G/DL (ref 3.5–5.2)
ALP SERPL-CCNC: 85 U/L (ref 35–104)
ALT SERPL W P-5'-P-CCNC: 12 U/L (ref 10–35)
ANION GAP SERPL CALCULATED.3IONS-SCNC: 12 MMOL/L (ref 7–15)
AST SERPL W P-5'-P-CCNC: 21 U/L (ref 10–35)
BILIRUB SERPL-MCNC: <0.2 MG/DL
BUN SERPL-MCNC: 17 MG/DL (ref 6–20)
CALCIUM SERPL-MCNC: 9.2 MG/DL (ref 8.6–10)
CHLORIDE SERPL-SCNC: 97 MMOL/L (ref 98–107)
CREAT SERPL-MCNC: 0.93 MG/DL (ref 0.51–0.95)
CRP SERPL-MCNC: 11.8 MG/L
DEPRECATED HCO3 PLAS-SCNC: 24 MMOL/L (ref 22–29)
ERYTHROCYTE [DISTWIDTH] IN BLOOD BY AUTOMATED COUNT: 16.2 % (ref 10–15)
GFR SERPL CREATININE-BSD FRML MDRD: 74 ML/MIN/1.73M2
GLUCOSE SERPL-MCNC: 94 MG/DL (ref 70–99)
HCT VFR BLD AUTO: 38 % (ref 35–47)
HGB BLD-MCNC: 12.1 G/DL (ref 11.7–15.7)
MCH RBC QN AUTO: 26.4 PG (ref 26.5–33)
MCHC RBC AUTO-ENTMCNC: 31.8 G/DL (ref 31.5–36.5)
MCV RBC AUTO: 83 FL (ref 78–100)
PLATELET # BLD AUTO: 260 10E3/UL (ref 150–450)
POTASSIUM SERPL-SCNC: 4.7 MMOL/L (ref 3.4–5.3)
PROT SERPL-MCNC: 6.9 G/DL (ref 6.4–8.3)
RBC # BLD AUTO: 4.58 10E6/UL (ref 3.8–5.2)
SODIUM SERPL-SCNC: 133 MMOL/L (ref 136–145)
WBC # BLD AUTO: 6.5 10E3/UL (ref 4–11)

## 2023-03-03 PROCEDURE — 80053 COMPREHEN METABOLIC PANEL: CPT

## 2023-03-03 PROCEDURE — 85027 COMPLETE CBC AUTOMATED: CPT

## 2023-03-03 PROCEDURE — 86140 C-REACTIVE PROTEIN: CPT

## 2023-03-03 PROCEDURE — 36415 COLL VENOUS BLD VENIPUNCTURE: CPT

## 2023-03-06 NOTE — TELEPHONE ENCOUNTER
Central Prior Authorization Team   Phone: [phone]    PA Initiation    Medication: Saxenda  Insurance Company: [employer] - Phone [phone] Fax [phone]  Pharmacy Filling the Rx: [id number] [place] [facility] [place] - [address]  Filling Pharmacy Phone: [phone]  Filling Pharmacy Fax:    Start Date: 3/6/2023

## 2023-03-07 NOTE — TELEPHONE ENCOUNTER
Prior Authorization Approval    Authorization Effective Date: 2/4/2023  Authorization Expiration Date: 3/5/2024  Medication: Saxenda  Approved Dose/Quantity:    Reference #:     Insurance Company: [employer] - Phone [phone] Fax [phone]  Expected CoPay:       CoPay Card Available:      Foundation Assistance Needed:    Which Pharmacy is filling the prescription (Not needed for infusion/clinic administered): [facility] [id number] [place] [address]  Pharmacy Notified: Yes  Patient Notified: Comment:  Pharmacy will notify patient

## 2023-03-14 ENCOUNTER — OFFICE VISIT (OUTPATIENT)
Dept: INTERNAL MEDICINE | Facility: CLINIC | Age: 53
End: 2023-03-14
Payer: COMMERCIAL

## 2023-03-14 ENCOUNTER — THERAPY VISIT (OUTPATIENT)
Dept: PHYSICAL THERAPY | Facility: CLINIC | Age: 53
End: 2023-03-14
Payer: COMMERCIAL

## 2023-03-14 VITALS
HEART RATE: 86 BPM | HEIGHT: 64 IN | DIASTOLIC BLOOD PRESSURE: 76 MMHG | SYSTOLIC BLOOD PRESSURE: 111 MMHG | BODY MASS INDEX: 34.31 KG/M2 | OXYGEN SATURATION: 95 % | WEIGHT: 201 LBS

## 2023-03-14 DIAGNOSIS — M54.16 CHRONIC LUMBAR RADICULOPATHY: ICD-10-CM

## 2023-03-14 DIAGNOSIS — M54.50 LUMBAGO: ICD-10-CM

## 2023-03-14 DIAGNOSIS — Z98.1 S/P SPINAL FUSION: Primary | ICD-10-CM

## 2023-03-14 DIAGNOSIS — I10 ESSENTIAL (PRIMARY) HYPERTENSION: ICD-10-CM

## 2023-03-14 DIAGNOSIS — R73.09 ELEVATED HEMOGLOBIN A1C: Primary | ICD-10-CM

## 2023-03-14 LAB — HBA1C MFR BLD: 5.3 % (ref 4.3–?)

## 2023-03-14 PROCEDURE — 97112 NEUROMUSCULAR REEDUCATION: CPT | Mod: GP | Performed by: PHYSICAL THERAPIST

## 2023-03-14 PROCEDURE — 83036 HEMOGLOBIN GLYCOSYLATED A1C: CPT | Performed by: NURSE PRACTITIONER

## 2023-03-14 PROCEDURE — 99214 OFFICE O/P EST MOD 30 MIN: CPT | Mod: 25 | Performed by: NURSE PRACTITIONER

## 2023-03-14 PROCEDURE — 97110 THERAPEUTIC EXERCISES: CPT | Mod: GP | Performed by: PHYSICAL THERAPIST

## 2023-03-14 RX ORDER — LISINOPRIL AND HYDROCHLOROTHIAZIDE 20; 25 MG/1; MG/1
0.5 TABLET ORAL DAILY
Qty: 90 TABLET | Refills: 3 | Status: ON HOLD | OUTPATIENT
Start: 2023-03-14 | End: 2024-02-16

## 2023-03-14 NOTE — NURSING NOTE
Zayra Ramirez is a 53 year old female patient that presents today in clinic for the following:    Chief Complaint   Patient presents with     Follow Up     Recheck Medication     Pt would like to go over BP meds     The patient's allergies and medications were reviewed as noted. A set of vitals were recorded as noted without incident. The patient does not have any other questions for the provider.    Sophia Bañuelos, EMT at 12:52 PM on 3/14/2023

## 2023-03-16 NOTE — PROGRESS NOTES
5.3S: Zayra Ramirez is a 53 year old female who realized she had continued to take lisinopril-hctz after discharge from the hospital after her spinal surgery.  Her discharge summary indicated she was suppose to stop taking this upon discharge.  She has been taking it and now has no refills.  Her BPs have been mostly SBP < 120 and DBP < 100.      She has been taking Saxenda for her T2DM and has lost 28 pounds.    She feels better after her spinal surgery.  She might need to have some cervical neck procedures done in the future. SHe is very pleased with how her recuperation has been progressing. She has been referred to the [facility].        Patient Active Problem List   Diagnosis     Chronic bilateral low back pain without sciatica     Facial cellulitis     Class 2 severe obesity with serious comorbidity and body mass index (BMI) of 35.0 to 35.9 in adult, unspecified obesity type (H)     Dental abscess     Hypoxia     Subcutaneous emphysema (H)     Borderline personality disorder (H)     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)     Esophageal stricture     Obstruction of esophagus     HTN (hypertension)     Interstitial lung disease (H)     Moderate persistent asthma without complication     Onychomycosis     Restless leg syndrome     Seasonal allergies     Stress     Respiratory bronchiolitis interstitial lung disease (H)     Spinal epidural abscess     Lumbar spondylosis     Inflammatory arthritis     Arthralgia of temporomandibular joint     Articular disc disorder of temporomandibular joint     Derangement of temporomandibular joint     Calculus of gallbladder without cholecystitis without obstruction     Displacement of articular disc of temporomandibular joint with reduction     Myofascial pain     Oral lesion     Symptomatic cholelithiasis     Sacro-iliac pain     Degenerative lumbar spinal stenosis     Glucose intolerance     Chronic neck pain     Lumbar radiculopathy, chronic      Cervical radiculopathy     Acute pain of right shoulder     Other osteoporosis without current pathological fracture     Diaphragmatic hernia     Bipolar 2 disorder (H)     Choking sensation     Gastro-esophageal reflux disease with esophagitis     Limited opening of mandible     History of pelvic surgery     Voiding dysfunction     Pelvic floor dysfunction     Urinary hesitancy     Urinary frequency     S/P spinal fusion            Past Medical History:   Diagnosis Date     Allergic rhinitis      Anemia      Arthritis      Asthma     copd     Dental abscess 08/2015     Depressive disorder      Gastroesophageal reflux disease      History of emphysema      Hoarseness      Hypertension      Obstructive sleep apnea      Other chronic pain      Respiratory bronchiolitis interstitial lung disease (H)      Sleep apnea      Smoker 11/02/2015            Past Surgical History:   Procedure Laterality Date     CERVICAL FUSION       COLONOSCOPY       COLONOSCOPY N/A 02/06/2020    Procedure: COLONOSCOPY, WITH POLYPECTOMY AND BIOPSY;  Surgeon: Julian Mccullough MD;  Location: [facility]     CYSTOSCOPY       ENT SURGERY       ESOPHAGOSCOPY, GASTROSCOPY, DUODENOSCOPY (EGD), COMBINED N/A 02/06/2020    Procedure: ESOPHAGOGASTRODUODENOSCOPY (EGD);  Surgeon: Julian Mccullough MD;  Location: [facility]     EXCISE LESION INTRAORAL Bilateral 10/03/2018    Procedure: EXCISE LESION INTRAORAL;  Wide Local Excision Of of Left Oral Cavity Ulcer;  Surgeon: Morro Mijares MD;  Location: [facility] OR     HC DRAIN SKIN ABSCESS SIMPLE/SINGLE  03/16/2012    Procedure:INCISION AND DRAINAGE, ABSCESS, SIMPLE; Surgeon:CHRISTIANO HANCOCK; Location: GI     HEAD & NECK SURGERY       HYSTERECTOMY       HYSTERECTOMY       INCISION AND DRAINAGE ABDOMEN WASHOUT, COMBINED       INJECT EPIDURAL CERVICAL N/A 10/14/2021    Procedure: Cervical 7- Thoracic 1 epidural steroid injection with fluoroscopy;  Surgeon: Tram Florian MD;  Location: [facility]      INJECT EPIDURAL LUMBAR Right 09/15/2020    Procedure: Lumbar5- sacral 1 epidural steroid injection with fluoroscopy;  Surgeon: Tram Florian MD;  [facility]     INJECT EPIDURAL LUMBAR Right 06/29/2021    Procedure: Lumbar 5 sacral 1 epidural steroid injection with fluoroscopy;  Surgeon: Tram Florian MD;  Location: [facility]     INJECT SACROILIAC JOINT Bilateral 06/16/2020    Procedure: Bilateral sacroiliac joint steroid injection with fluoroscopy;  Surgeon: Tram Florian MD;  Location: [facility]     LAMINECTOMY THORACIC ONE LEVEL N/A 08/19/2019    Procedure: LAMINECTOMY, SPINE, THORACIC, 11-12 and Part of Lumbar 1, DRAINAGE OF EPIDURAL ABCESS, Epidural Drain Placement X 2;  Surgeon: Yadiel Beal MD;  Location: [facility] OR     OPTICAL TRACKING SYSTEM FUSION SPINE POSTERIOR LUMBAR THREE+ LEVELS N/A 11/30/2022    Procedure: Posterior Instrumented Spinal Fusion Thoracic 8 to Sacrum with Bilateral Pelvic Fixation; Transforaminal Lumbar Interbody Fusion with Erickson-Nixon Osteotomy Lumbar 1 to Sacral 1 (5 levels); Use of Infuse Bone Morphogenetic Protein Large Kit and Allograft;  Surgeon: Philip Wilhelm MD;  Location: [facility] OR     MT PERCUT IMPLNT NEUROELECT,EPIDURAL N/A 08/08/2019    Procedure: TRIAL, SPINAL CORD STIMULATOR WITH BOSTON SCIENTIFIC;  Surgeon: Sipple, Daniel Peter, DO;  Location: [facility];  Service: Pain     RADIO FREQUENCY ABLATION / DESTRUCTION OF SACROILOAC JOINT DORSAL PRIMARY RAMUS Bilateral 12/17/2019    Procedure: Bilateral lumbar radiofrequency ablation with fluoroscopy and intravenous sedation ( Lumbar 2,3,4,5 medial branch nerves for the bilateral lumbar3-4, 4-5 and 5-sacral1 joints.;  Surgeon: Tram Florian MD;  [facility]: [facility]     RADIO FREQUENCY ABLATION / DESTRUCTION OF SACROILOAC JOINT DORSAL PRIMARY RAMUS Right 11/17/2020    Procedure: Right lumbar medial branch nerve radiofrequency ablation right L2,3,4,5 nerves supplying the right L3-4, L4-5 and L5-S1 facet  joints;  Surgeon: Tram Florian MD;  Location: [facility] OR     spinal cord stimulator  2019     spinal cord stimulator removal  2019            Social History     Tobacco Use     Smoking status: Former     Packs/day: 1.00     Years: 30.00     Pack years: 30.00     Types: Cigarettes     Start date: 1996     Quit date: 2021     Years since quittin.3     Smokeless tobacco: Never   Substance Use Topics     Alcohol use: No            Family History   Problem Relation Age of Onset     Asthma Mother      Restless Leg Syndrome Mother      Other Cancer Father         base of tongue cancer at age ~65     Hypertension Father      Back Pain Father      Restless Leg Syndrome Father      Coronary Artery Disease Father      Restless Leg Syndrome Sister      Breast Cancer Maternal Aunt 55     Anesthesia Reaction No family hx of      Deep Vein Thrombosis (DVT) No family hx of      Thyroid Cancer No family hx of      Multiple endocrine neoplasia No family hx of                Allergies   Allergen Reactions     Bee Venom Anaphylaxis     Doxycycline Anaphylaxis     Patient thinks it may have been just nausea and vomiting, however unable to confirm  Other reaction(s): throat closes     Erythromycin      Other reaction(s): Vomiting       Hydrocodone-Acetaminophen Itching            Current Outpatient Medications   Medication Sig Dispense Refill     acetaminophen (TYLENOL) 325 MG tablet Take 2 tablets (650 mg) by mouth every 4 hours as needed for pain 100 tablet 1     albuterol (PROAIR HFA/PROVENTIL HFA/VENTOLIN HFA) 108 (90 Base) MCG/ACT inhaler Inhale 2 puffs into the lungs every 6 hours as needed for shortness of breath / dyspnea or wheezing Ventolin please 18 g 2     ARIPiprazole (ABILIFY) 5 MG tablet Take 5 mg by mouth daily       Black Pepper-Turmeric (TURMERIC CURCUMIN) 5-1000 MG CAPS Take 1 capsule by mouth daily       busPIRone HCl (BUSPAR) 30 MG tablet Take 30 mg by mouth 2 times daily         carboxymethylcellulose (CARBOXYMETHYLCELLULOSE SODIUM) 0.5 % SOLN ophthalmic solution Place 1 drop into both eyes 3 times daily as needed for dry eyes 15 mL 11     celecoxib (CELEBREX) 200 MG capsule Take 1 capsule (200 mg) by mouth every morning 60 capsule 2     DULoxetine (CYMBALTA) 60 MG capsule Take 120 mg by mouth At Bedtime        EPINEPHrine (EPIPEN/ADRENACLICK/OR ANY BX GENERIC EQUIV) 0.3 MG/0.3ML injection 2-pack Inject 0.3 mg into the muscle as needed       ferrous fumarate 65 mg, [place]. FE,-Vitamin C 125 mg (VITRON C)  MG TABS tablet Take 1 tablet by mouth daily       hydroxychloroquine (PLAQUENIL) 200 MG tablet Take 1 tablet (200 mg) by mouth 2 times daily 60 tablet 7     hydrOXYzine (ATARAX) 25 MG tablet Take 1 tablet (25 mg) by mouth every 6 hours as needed for other (adjuvant pain) 30 tablet 2     insulin pen needle (32G X 4 MM) 32G X 4 MM miscellaneous Use 1 NEEDLE WEEKLY 12 each 3     insulin pen needle (32G X 4 MM) 32G X 4 MM miscellaneous Use 1 NEEDLE daily to use with Saxenda 100 each 3     insulin pen needle (32G X 6 MM) 32G X 6 MM miscellaneous Use daily pen needles daily or as directed. 100 each 2     lisinopril-hydrochlorothiazide (ZESTORETIC) 20-25 MG tablet Take 0.5 tablets by mouth daily 90 tablet 3     montelukast (SINGULAIR) 10 MG tablet Take 1 tablet by mouth At Bedtime       montelukast (SINGULAIR) 10 MG tablet Take 10 mg by mouth daily       omeprazole (PRILOSEC) 40 MG DR capsule Take 1 capsule (40 mg) by mouth daily 180 capsule 3     oxyCODONE (ROXICODONE) 5 MG tablet Take 1 tablet (5 mg) by mouth every 12 hours as needed for severe pain (7-10) (wean off as pain improves) 14 tablet 0     OXYGEN-HELIUM IN 3L @ night    Oxygen only not helium       polyethylene glycol (MIRALAX) 17 g packet Take 17 g by mouth daily 10 packet 1     polyethylene glycol (MIRALAX) 17 GM/Dose powder        pregabalin (LYRICA) 150 MG capsule Take 1 capsule by mouth in the morning and 1 capsule around 1  "PM 60 capsule 3     Respiratory Therapy Supplies ([facility] CPAP FILTER) MISC        rOPINIRole (REQUIP) 0.5 MG tablet Take 1 tablet (0.5 mg) by mouth At Bedtime Take 1 tab by mouth at bedtime. 90 tablet 1     SAXENDA 18 MG/3ML pen INJECT 3 MG SUBCUTANEOUS DAILY 15 mL 1     senna-docusate (SENOKOT-S/PERICOLACE) 8.6-50 MG tablet Take 1 tablet by mouth 2 times daily 30 tablet 2     sulfamethoxazole-trimethoprim (BACTRIM DS) 800-160 MG tablet TAKE 1 TABLET BY MOUTH EVERY DAY AS ONE DOSE       tiZANidine (ZANAFLEX) 2 MG tablet Take 1 tablet (2 mg) by mouth 3 times daily as needed for muscle spasms 40 tablet 2     vitamin D3 (CHOLECALCIFEROL) 250 mcg (76470 units) capsule Take 1 capsule by mouth daily       vitamin E (TOCOPHEROL) 1000 units (450 mg) CAPS capsule Take 1,000 Units by mouth daily       diazepam (VALIUM) 5 MG tablet Take 1 tab as directed by MRI staff. May repeat Once as needed (Patient not taking: Reported on 3/14/2023) 2 tablet 0     methotrexate sodium 2.5 MG TABS TAKE 9 TABLETS (22.5 MG) BY MOUTH ONCE A WEEK (Patient not taking: Reported on 3/2/2023)         REVIEW OF SYSTEMS:  See above.    O:   /76 (BP Location: Right arm, Patient Position: Sitting, Cuff Size: Adult Large)   Pulse 86   Ht 1.626 m (5' 4\")   Wt 91.2 kg (201 lb)   LMP  (LMP Unknown)   SpO2 95%   BMI 34.50 kg/m    GENERAL APPEARANCE: healthy, alert and no distress  EYES: sclera white.  RESP: lungs clear to auscultation - no rales, rhonchi or wheezes  CV: regular rates and rhythm, normal S1 S2, no S3 or S4 and no murmur, click or rub.   VS w/ IP 3/14/2023   SYSTOLIC 111   DIASTOLIC 76   PULSE 86   TEMPERATURE      NEURO: alert and oriented.  Good historian.  MUSK: ambulatory.  SKIN:  Her spinal surgical scar is well healed.   PSYCHE: normal    A/P:  Zayra was seen today for follow up and recheck medication.    Diagnoses and all orders for this visit:    Elevated hemoglobin A1c  -     Hemoglobin A1c POCT:  " 5.3    Essential (primary) hypertension  -     lisinopril-hydrochlorothiazide (ZESTORETIC) 20-25 MG tablet; Take 0.5 tablets by mouth daily.  This is half her previous dose.  She will monitor her home BPs She will contact us if her SBP > 140 or DBP > 90.     The patient voiced understanding of the information discussed and all questions were answered.     I spent a total of  32  minutes in the care of this pt during today's office visit. This time includes reviewing the patient's chart and prior history, obtaining a history, performing an examination and evaluation and counseling the patient. This time also includes ordering medications or tests necessary in addition to communication to other member's of the patient's health care team. Time spent in documentation and care coordination is included.     Neena MAHARAJ, CNP

## 2023-03-17 ENCOUNTER — MYC MEDICAL ADVICE (OUTPATIENT)
Dept: RHEUMATOLOGY | Facility: CLINIC | Age: 53
End: 2023-03-17
Payer: COMMERCIAL

## 2023-03-17 DIAGNOSIS — M05.9 SEROPOSITIVE RHEUMATOID ARTHRITIS (H): Primary | ICD-10-CM

## 2023-03-18 NOTE — TELEPHONE ENCOUNTER
Patient reports her shoulders are stiff and starting to be more painful/uncomfortable. The past several years my flare symptoms have started.  Left side is more painful than right.  Arms have full range of motion but notice that they are getting more tight.  Left side of face sinus, itchy face, slight runny nose but patient reports she thinks it could be her allergies starting.  Reports that her knees are a little more irritated more than normal.  No redness noted in any joints, no pain to touch, pain only with movement.  Patient concerned for the last 3 days she is really stiff in the am, hard to get up out of bed.  Today it took about an hour for stiffness to go down to beable to move and get out of bed.  Denies any other flare symptoms beyond Shoulder stiffness and her knees.    Patient reports in the past she has use Methotrexate and Prednisone to help with the flare resolution.  Patient has stopped taking Methotrexate since her back surgery last Nov.  Patient is wondering what to do.    Forwarded to Dr. Saenz to see about change in med regimen if needed.    Sangita Barth RN, BSN, PHN  [facility] Nurse Navigator  [phone]

## 2023-03-18 NOTE — TELEPHONE ENCOUNTER
Impression: mild increase aching shoulder pain. Flare may be temporary.  Recommend ibuprofen 600 mg 3 times daily with food for 3 days, then reduce to 600 mg as needed, up to twice daily for residual pain.

## 2023-03-20 RX ORDER — METHYLPREDNISOLONE 4 MG
TABLET, DOSE PACK ORAL
Qty: 21 TABLET | Refills: 0 | Status: SHIPPED | OUTPATIENT
Start: 2023-03-20 | End: 2023-07-05

## 2023-03-20 NOTE — TELEPHONE ENCOUNTER
Patient calling.  Shoulder and knee pain is not any better.  L > R.  Achy type pain.  Reports difficulty with raising arms in the air.  Bilateral knee pain, with difficulty walking.  Denies swelling, but with redness.  Using ibuprofen and tylenol on a regular basis without relief. Methotrexate stopped in Nov d/t surgery.  Currenty taking plaquenil 200mg BID.  Has taken medrol dose arnold in the past with relief.  Going to [place] in a couple of weeks and would like to get this under control before leaving. RX is pended.  Please sign and review.     Malu Maldonado RN     normal...

## 2023-03-28 ENCOUNTER — MYC MEDICAL ADVICE (OUTPATIENT)
Dept: RHEUMATOLOGY | Facility: CLINIC | Age: 53
End: 2023-03-28

## 2023-03-28 DIAGNOSIS — M05.9 SEROPOSITIVE RHEUMATOID ARTHRITIS (H): Primary | ICD-10-CM

## 2023-03-28 RX ORDER — PREDNISONE 5 MG/1
TABLET ORAL
Qty: 68 TABLET | Refills: 0 | Status: SHIPPED | OUTPATIENT
Start: 2023-03-28 | End: 2023-07-05

## 2023-03-28 NOTE — TELEPHONE ENCOUNTER
Prescription signed per Dr Saenz.  Patient called and aware.     Malu Maldonado RN     Enbrel Pregnancy And Lactation Text: This medication is Pregnancy Category B and is considered safe during pregnancy. It is unknown if this medication is excreted in breast milk.

## 2023-03-28 NOTE — TELEPHONE ENCOUNTER
Patient calling with the return of shoulder and knee pain.   L shoulder > R.  Achy type pain.  Reports difficulty with raising arms in the air.  Difficulty dressing. Bilateral knee pain, with difficulty walking.  Denies swelling, but with slight redness in both knees.  Using tylenol as needed without much relief. Methotrexate stopped in Nov d/t surgery.  Currenty taking plaquenil 200mg BID.  Just finished a medrol dose pack a few days ago.  Going to [place] in a week and would like to get this under control before leaving. Patient reports taking a prednisone taper in the past with some relief.  Chart review looks like she took pred 20mg x7 days, 15mg x7 days then off.  Patient looking for a longer taper.  Rx is pended, please review and sign.     Malu Maldonado RN

## 2023-03-31 DIAGNOSIS — G25.81 RESTLESS LEG SYNDROME: ICD-10-CM

## 2023-04-02 RX ORDER — ROPINIROLE 0.5 MG/1
0.5 TABLET, FILM COATED ORAL AT BEDTIME
Qty: 90 TABLET | Refills: 1 | Status: SHIPPED | OUTPATIENT
Start: 2023-04-02 | End: 2023-08-23

## 2023-04-07 ENCOUNTER — THERAPY VISIT (OUTPATIENT)
Dept: PHYSICAL THERAPY | Facility: CLINIC | Age: 53
End: 2023-04-07
Payer: COMMERCIAL

## 2023-04-07 DIAGNOSIS — M54.16 CHRONIC LUMBAR RADICULOPATHY: ICD-10-CM

## 2023-04-07 DIAGNOSIS — M54.50 LUMBAGO: ICD-10-CM

## 2023-04-07 DIAGNOSIS — Z98.1 S/P SPINAL FUSION: Primary | ICD-10-CM

## 2023-04-07 PROCEDURE — 97112 NEUROMUSCULAR REEDUCATION: CPT | Mod: GP | Performed by: PHYSICAL THERAPIST

## 2023-04-07 PROCEDURE — 97110 THERAPEUTIC EXERCISES: CPT | Mod: GP | Performed by: PHYSICAL THERAPIST

## 2023-04-21 ENCOUNTER — OFFICE VISIT (OUTPATIENT)
Dept: UROLOGY | Facility: CLINIC | Age: 53
End: 2023-04-21
Payer: COMMERCIAL

## 2023-04-21 VITALS
WEIGHT: 201 LBS | HEART RATE: 89 BPM | DIASTOLIC BLOOD PRESSURE: 80 MMHG | OXYGEN SATURATION: 98 % | BODY MASS INDEX: 34.31 KG/M2 | HEIGHT: 64 IN | SYSTOLIC BLOOD PRESSURE: 114 MMHG

## 2023-04-21 DIAGNOSIS — N39.8 VOIDING DYSFUNCTION: ICD-10-CM

## 2023-04-21 DIAGNOSIS — M62.89 PELVIC FLOOR DYSFUNCTION: Primary | ICD-10-CM

## 2023-04-21 DIAGNOSIS — R35.0 URINARY FREQUENCY: ICD-10-CM

## 2023-04-21 DIAGNOSIS — R39.11 URINARY HESITANCY: ICD-10-CM

## 2023-04-21 DIAGNOSIS — Z98.890 HISTORY OF PELVIC SURGERY: ICD-10-CM

## 2023-04-21 LAB — RESIDUAL VOLUME (RV) (EXTERNAL): 53

## 2023-04-21 PROCEDURE — 51798 US URINE CAPACITY MEASURE: CPT | Performed by: PHYSICIAN ASSISTANT

## 2023-04-21 PROCEDURE — 99214 OFFICE O/P EST MOD 30 MIN: CPT | Mod: 25 | Performed by: PHYSICIAN ASSISTANT

## 2023-04-21 ASSESSMENT — PAIN SCALES - GENERAL: PAINLEVEL: NO PAIN (0)

## 2023-04-21 NOTE — PROGRESS NOTES
Urology Clinic      Name: Zayra Ramirez    MRN: [id number]   YOB: 1970  Accompanied at today's visit by:self                 Chief Complaint:   Voiding dysfunction          History of Present Illness:   April 21, 2023    HISTORY:   We have been following 53 year old Zayra Ramirez for cystocele/recotcele repair in 1997, urinary frequency, urinary hesitancy, voiding dysfunction and pelvic floor dysfunction. Last seen on 12/22/22 for TOV, as developed urinary retention after her back surgery. Failed her TOV that day. Came back to clinic on 1/6/23; filled to 200mL and voided 125mL. Patient declined having bond replaced. States since that encounter she has been able to void well on her own. States she is voiding more now on her own that prior to her back surgery and is pleased by this. However does notice some instances where she does not feel like she empties completely so she will push to get the rest of her urine out. Also reports urinary urgency to the point where she feels like she might not make it to the toilet in time, but denies urinary incontinence. Of note, on 10/27/22 had VUDS and Cysto with Dr. Saucedo. VUDS showed minimal detrusor function and high EMG activity when instructed to void at end of study suggesive of pelvic floor dysfunction. Cysto was unremarkable. Was referred to PFPT at last encounter. Denies going to PFPT as she is currently in PT for her back. Bowels regular. Denies s/s of UTI. Hx of asthma. Patient voices no other concerns at this time.           Allergies:     Allergies   Allergen Reactions     Bee Venom Anaphylaxis     Doxycycline Anaphylaxis     Patient thinks it may have been just nausea and vomiting, however unable to confirm  Other reaction(s): throat closes     Erythromycin      Other reaction(s): Vomiting       Hydrocodone-Acetaminophen Itching            Medications:     Current Outpatient Medications   Medication Sig     albuterol (PROAIR HFA/PROVENTIL  HFA/VENTOLIN HFA) 108 (90 Base) MCG/ACT inhaler Inhale 2 puffs into the lungs every 6 hours as needed for shortness of breath / dyspnea or wheezing Ventolin please     ARIPiprazole (ABILIFY) 5 MG tablet Take 5 mg by mouth daily     Black Pepper-Turmeric (TURMERIC CURCUMIN) 5-1000 MG CAPS Take 1 capsule by mouth daily     busPIRone HCl (BUSPAR) 30 MG tablet Take 30 mg by mouth 2 times daily      carboxymethylcellulose (CARBOXYMETHYLCELLULOSE SODIUM) 0.5 % SOLN ophthalmic solution Place 1 drop into both eyes 3 times daily as needed for dry eyes     celecoxib (CELEBREX) 200 MG capsule Take 1 capsule (200 mg) by mouth every morning     cyclobenzaprine (FLEXERIL) 10 MG tablet Take 1 tablet (10 mg) by mouth At Bedtime     DULoxetine (CYMBALTA) 60 MG capsule Take 120 mg by mouth At Bedtime      EPINEPHrine (EPIPEN/ADRENACLICK/OR ANY BX GENERIC EQUIV) 0.3 MG/0.3ML injection 2-pack Inject 0.3 mg into the muscle as needed     ferrous fumarate 65 mg, [place]. FE,-Vitamin C 125 mg (VITRON C)  MG TABS tablet Take 1 tablet by mouth daily     hydroxychloroquine (PLAQUENIL) 200 MG tablet Take 1 tablet (200 mg) by mouth 2 times daily     insulin pen needle (32G X 4 MM) 32G X 4 MM miscellaneous Use 1 NEEDLE WEEKLY     insulin pen needle (32G X 4 MM) 32G X 4 MM miscellaneous Use 1 NEEDLE daily to use with Saxenda     insulin pen needle (32G X 6 MM) 32G X 6 MM miscellaneous Use daily pen needles daily or as directed.     lisinopril-hydrochlorothiazide (ZESTORETIC) 20-25 MG tablet Take 0.5 tablets by mouth daily     montelukast (SINGULAIR) 10 MG tablet Take 1 tablet by mouth At Bedtime     montelukast (SINGULAIR) 10 MG tablet Take 10 mg by mouth daily     omeprazole (PRILOSEC) 40 MG DR capsule Take 1 capsule (40 mg) by mouth daily     OXYGEN-HELIUM IN 3L @ night    Oxygen only not helium     polyethylene glycol (MIRALAX) 17 g packet Take 17 g by mouth daily     pregabalin (LYRICA) 150 MG capsule Take 1 capsule by mouth in the morning  and 1 capsule around 1 PM     Respiratory Therapy Supplies ([facility] CPAP FILTER) MISC      rOPINIRole (REQUIP) 0.5 MG tablet Take 1 tablet (0.5 mg) by mouth At Bedtime Take 1 tab by mouth at bedtime.     SAXENDA 18 MG/3ML pen INJECT 3 MG SUBCUTANEOUS DAILY     senna-docusate (SENOKOT-S/PERICOLACE) 8.6-50 MG tablet Take 1 tablet by mouth 2 times daily     sulfamethoxazole-trimethoprim (BACTRIM DS) 800-160 MG tablet TAKE 1 TABLET BY MOUTH EVERY DAY AS ONE DOSE     vitamin D3 (CHOLECALCIFEROL) 250 mcg (86258 units) capsule Take 1 capsule by mouth daily     vitamin E (TOCOPHEROL) 1000 units (450 mg) CAPS capsule Take 1,000 Units by mouth daily     acetaminophen (TYLENOL) 325 MG tablet Take 2 tablets (650 mg) by mouth every 4 hours as needed for pain (Patient not taking: Reported on 4/21/2023)     diazepam (VALIUM) 5 MG tablet Take 1 tab as directed by MRI staff. May repeat Once as needed (Patient not taking: Reported on 3/14/2023)     hydrOXYzine (ATARAX) 25 MG tablet Take 1 tablet (25 mg) by mouth every 6 hours as needed for other (adjuvant pain) (Patient not taking: Reported on 4/21/2023)     methotrexate sodium 2.5 MG TABS TAKE 9 TABLETS (22.5 MG) BY MOUTH ONCE A WEEK (Patient not taking: Reported on 3/2/2023)     methylPREDNISolone (MEDROL DOSEPAK) 4 MG tablet therapy pack Follow Package Directions (Patient not taking: Reported on 4/21/2023)     oxyCODONE (ROXICODONE) 5 MG tablet Take 1 tablet (5 mg) by mouth every 12 hours as needed for severe pain (7-10) (wean off as pain improves) (Patient not taking: Reported on 4/21/2023)     polyethylene glycol (MIRALAX) 17 GM/Dose powder  (Patient not taking: Reported on 4/21/2023)     predniSONE (DELTASONE) 5 MG tablet Take 20mg (4 tablets) daily for 1 week, then 15mg (3 tablets) daily for 1 week, then 10mg (2 tablets) daily for 1 week, then 5mg (1 tablet) daily for 1 week, then off. (Patient not taking: Reported on 4/21/2023)     No current facility-administered  medications for this visit.     Facility-Administered Medications Ordered in Other Visits   Medication     Lidocaine 1 % injection 0.5-5 mL     sodium chloride (PF) 0.9% PF flush 5-50 mL               Past  Surgical History:     Past Surgical History:   Procedure Laterality Date     CERVICAL FUSION       COLONOSCOPY       COLONOSCOPY N/A 02/06/2020    Procedure: COLONOSCOPY, WITH POLYPECTOMY AND BIOPSY;  Surgeon: Julian Mccullough MD;  Location: [facility]     CYSTOSCOPY       ENT SURGERY       ESOPHAGOSCOPY, GASTROSCOPY, DUODENOSCOPY (EGD), COMBINED N/A 02/06/2020    Procedure: ESOPHAGOGASTRODUODENOSCOPY (EGD);  Surgeon: Julian Mccullough MD;  Location: [facility]     EXCISE LESION INTRAORAL Bilateral 10/03/2018    Procedure: EXCISE LESION INTRAORAL;  Wide Local Excision Of of Left Oral Cavity Ulcer;  Surgeon: Morro Mijares MD;  Location: [facility]     HC DRAIN SKIN ABSCESS SIMPLE/SINGLE  03/16/2012    Procedure:INCISION AND DRAINAGE, ABSCESS, SIMPLE; Surgeon:CHRISTIANO HANCOCK; Location: GI     HEAD & NECK SURGERY       HYSTERECTOMY       HYSTERECTOMY       INCISION AND DRAINAGE ABDOMEN WASHOUT, COMBINED       INJECT EPIDURAL CERVICAL N/A 10/14/2021    Procedure: Cervical 7- Thoracic 1 epidural steroid injection with fluoroscopy;  Surgeon: Tram Florian MD;  Location: [facility]     INJECT EPIDURAL LUMBAR Right 09/15/2020    Procedure: Lumbar5- sacral 1 epidural steroid injection with fluoroscopy;  Surgeon: Tram Florian MD;  [facility]     INJECT EPIDURAL LUMBAR Right 06/29/2021    Procedure: Lumbar 5 sacral 1 epidural steroid injection with fluoroscopy;  Surgeon: Tram Florian MD;  Location: [facility] OR     INJECT SACROILIAC JOINT Bilateral 06/16/2020    Procedure: Bilateral sacroiliac joint steroid injection with fluoroscopy;  Surgeon: Tram Florian MD;  [facility]     LAMINECTOMY THORACIC ONE LEVEL N/A 08/19/2019    Procedure: LAMINECTOMY, SPINE, THORACIC, 11-12 and Part of Lumbar 1,  "DRAINAGE OF EPIDURAL ABCESS, Epidural Drain Placement X 2;  Surgeon: Yadiel Beal MD;  Location: [facility]     OPTICAL TRACKING SYSTEM FUSION SPINE POSTERIOR LUMBAR THREE+ LEVELS N/A 11/30/2022    Procedure: Posterior Instrumented Spinal Fusion Thoracic 8 to Sacrum with Bilateral Pelvic Fixation; Transforaminal Lumbar Interbody Fusion with Erickson-Nixon Osteotomy Lumbar 1 to Sacral 1 (5 levels); Use of Infuse Bone Morphogenetic Protein Large Kit and Allograft;  Surgeon: Philip Wilhelm MD;  Location: [facility]     ME PERCUT IMPLNT NEUROELECT,EPIDURAL N/A 08/08/2019    Procedure: TRIAL, SPINAL CORD STIMULATOR WITH [employer];  Surgeon: Sipple, Daniel Peter, DO;  Location: [facility];  Service: Pain     RADIO FREQUENCY ABLATION / DESTRUCTION OF SACROILOAC JOINT DORSAL PRIMARY RAMUS Bilateral 12/17/2019    Procedure: Bilateral lumbar radiofrequency ablation with fluoroscopy and intravenous sedation ( Lumbar 2,3,4,5 medial branch nerves for the bilateral lumbar3-4, 4-5 and 5-sacral1 joints.;  Surgeon: Tram Florian MD;  Location:  OR     RADIO FREQUENCY ABLATION / DESTRUCTION OF SACROILOAC JOINT DORSAL PRIMARY RAMUS Right 11/17/2020    Procedure: Right lumbar medial branch nerve radiofrequency ablation right L2,3,4,5 nerves supplying the right L3-4, L4-5 and L5-S1 facet joints;  Surgeon: Tram Florian MD;  [facility]     spinal cord stimulator  08/08/2019     spinal cord stimulator removal  08/13/2019             Physical Exam:     Vitals:    04/21/23 1025   BP: 114/80   BP Location: Right arm   Pulse: 89   SpO2: 98%   Weight: 91.2 kg (201 lb)   Height: 1.626 m (5' 4\")     PSYCH: NAD  EYES: EOMI  NEURO: AAO x3    LABS:  PVR 53mL    Creatinine   Date Value Ref Range Status   03/03/2023 0.93 0.51 - 0.95 mg/dL Final   07/10/2021 1.01 0.52 - 1.04 mg/dL Final            Assessment and Plan:   53 year old is a pleasant female who has cystocele/recotcele repair in 1997, urinary frequency, " urinary hesitancy, voiding dysfunction and pelvic floor dysfunction.    - PVR WNL today.  - Discussed with patient possibly repeating VUDS, however patient would like to hold off on this. Unable to do PFPT as continues to go to PT for her back rehab.   - Unclear if having OAB symptoms or intermittent incomplete emptying. Taught CIC today and advised to perform CIC prn when feels like she can't empty. Advised to record her void and PVR amounts and report to me via [employer] in 2 weeks.  - Encourage voiding every 2-3 hours during the day and taking her time when voiding.   - Consider OAB medication if PVRs WNL as could likely be urinary urgency  - If PVRs elevated, consider repeating UDS.  - Avoid bladder irritants.  - After discussing the assessment and plan with patient, patient verbalizes understanding and agrees to the above plan. All questions answered.       Other orders as below:  Orders Placed This Encounter   Procedures     MEASURE POST-VOID RESIDUAL URINE/BLADDER CAPACITY, US NON-IMAGING (38073)     30 minutes spent on the date of the encounter doing chart review, review of outside records, review of test results, interpretation of tests, patient visit and documentation.      Danni Dai PA-C  Urology  April 21, 2023      Patient Care Team:  Neena Tam APRN CNP as PCP - General (Internal Medicine)  Care, [facility] ([facility] ([facility] ([place]))  Sumaya Bustillo PA as Physician Assistant (Physician Assistant)  Julian Mccullough MD as MD (Gastroenterology)  Panchito Saenz MD as Assigned Rheumatology Provider  Neena Tam APRN CNP as Assigned PCP  Leodan Gan MD as Resident (Student in organized health care education/training program)  Morro Miajres MD as Assigned Surgical Provider  Trent Rahman MD as MD (Gastroenterology)  Mick Avalos Jr., MD as Resident (Internal Medicine)  Mai Sotomayor PA-C as Physician Assistant (Pediatric  Critical Care Medicine)  Power Ojeda MD as MD (Physical Medicine and Rehabilitation)  Philip Wilhelm MD as Assigned Musculoskeletal Provider  Mikayla Morin, PhD as Assigned Behavioral Health Provider  Trent Rahman MD as Assigned Gastroenterology Provider  Jamaal Dorsey MD as MD (OB/Gyn)  Naya Melendez, PharmD as MTM Pharamcist (Pharmacist)  Yeimy Guerra DO as Assigned Neuroscience Provider  Dior Arroyo PA-C as Assigned Cancer Care Provider  Naya Melendez, PharmD as Assigned MTM Pharmacist  Neena Tam, APRN CNP as Assigned Pain Medication Provider  [employer]

## 2023-04-21 NOTE — PATIENT INSTRUCTIONS
- Please mychart your void and cathed amounts to me in 2 weeks.   - Contact our clinic if develop symptoms of a UTI.   - Try to void every 2-3 hours.   - Take your time with voiding. Encourage leaning side to side or standing up then sitting back down when voiding to see if helps empty bladder.   -Below is a list of things that can irritate the bladder and should be eliminated:  Caffeinated soft drinks.  Coffee.  Tea.  Chocolate.  Tomato-based foods.  Acidic juices and fruits. (includes cranberry juice)  Alcohol.  Nicotine  Carbonated drinks.  Aspartame/Nutrasweet.

## 2023-04-21 NOTE — NURSING NOTE
Chief Complaint   Patient presents with     History of urinary retention     Patient states when she feels like she has to void it is right away.   Patients PVR by bladder scan was 53 ml. Patient was taught to self catheterize today with a 14 fr straight cath. It was recommended to patient to self catheterize once a day.  Doreen Bolanos LPN

## 2023-04-21 NOTE — LETTER
4/21/2023       RE: Zayra Ramirez  [address] [place] [address]     Dear Colleague,    Thank you for referring your patient, Zayra Ramirez, to the [facility] [place] at [facility]. Please see a copy of my visit note below.    Urology Clinic      Name: Zayra Ramirez    MRN: [id number]   YOB: 1970  Accompanied at today's visit by:self                 Chief Complaint:   Voiding dysfunction          History of Present Illness:   April 21, 2023    HISTORY:   We have been following 53 year old Zayra Ramirez for cystocele/recotcele repair in 1997, urinary frequency, urinary hesitancy, voiding dysfunction and pelvic floor dysfunction. Last seen on 12/22/22 for TOV, as developed urinary retention after her back surgery. Failed her TOV that day. Came back to clinic on 1/6/23; filled to 200mL and voided 125mL. Patient declined having bond replaced. States since that encounter she has been able to void well on her own. States she is voiding more now on her own that prior to her back surgery and is pleased by this. However does notice some instances where she does not feel like she empties completely so she will push to get the rest of her urine out. Also reports urinary urgency to the point where she feels like she might not make it to the toilet in time, but denies urinary incontinence. Of note, on 10/27/22 had VUDS and Cysto with Dr. Saucedo. VUDS showed minimal detrusor function and high EMG activity when instructed to void at end of study suggesive of pelvic floor dysfunction. Cysto was unremarkable. Was referred to PFPT at last encounter. Denies going to PFPT as she is currently in PT for her back. Bowels regular. Denies s/s of UTI. Hx of asthma. Patient voices no other concerns at this time.           Allergies:     Allergies   Allergen Reactions    Bee Venom Anaphylaxis    Doxycycline Anaphylaxis     Patient thinks it may  have been just nausea and vomiting, however unable to confirm  Other reaction(s): throat closes    Erythromycin      Other reaction(s): Vomiting      Hydrocodone-Acetaminophen Itching            Medications:     Current Outpatient Medications   Medication Sig    albuterol (PROAIR HFA/PROVENTIL HFA/VENTOLIN HFA) 108 (90 Base) MCG/ACT inhaler Inhale 2 puffs into the lungs every 6 hours as needed for shortness of breath / dyspnea or wheezing Ventolin please    ARIPiprazole (ABILIFY) 5 MG tablet Take 5 mg by mouth daily    Black Pepper-Turmeric (TURMERIC CURCUMIN) 5-1000 MG CAPS Take 1 capsule by mouth daily    busPIRone HCl (BUSPAR) 30 MG tablet Take 30 mg by mouth 2 times daily     carboxymethylcellulose (CARBOXYMETHYLCELLULOSE SODIUM) 0.5 % SOLN ophthalmic solution Place 1 drop into both eyes 3 times daily as needed for dry eyes    celecoxib (CELEBREX) 200 MG capsule Take 1 capsule (200 mg) by mouth every morning    cyclobenzaprine (FLEXERIL) 10 MG tablet Take 1 tablet (10 mg) by mouth At Bedtime    DULoxetine (CYMBALTA) 60 MG capsule Take 120 mg by mouth At Bedtime     EPINEPHrine (EPIPEN/ADRENACLICK/OR ANY BX GENERIC EQUIV) 0.3 MG/0.3ML injection 2-pack Inject 0.3 mg into the muscle as needed    ferrous fumarate 65 mg, [place]. FE,-Vitamin C 125 mg (VITRON C)  MG TABS tablet Take 1 tablet by mouth daily    hydroxychloroquine (PLAQUENIL) 200 MG tablet Take 1 tablet (200 mg) by mouth 2 times daily    insulin pen needle (32G X 4 MM) 32G X 4 MM miscellaneous Use 1 NEEDLE WEEKLY    insulin pen needle (32G X 4 MM) 32G X 4 MM miscellaneous Use 1 NEEDLE daily to use with Saxenda    insulin pen needle (32G X 6 MM) 32G X 6 MM miscellaneous Use daily pen needles daily or as directed.    lisinopril-hydrochlorothiazide (ZESTORETIC) 20-25 MG tablet Take 0.5 tablets by mouth daily    montelukast (SINGULAIR) 10 MG tablet Take 1 tablet by mouth At Bedtime    montelukast (SINGULAIR) 10 MG tablet Take 10 mg by mouth daily     omeprazole (PRILOSEC) 40 MG DR capsule Take 1 capsule (40 mg) by mouth daily    OXYGEN-HELIUM IN 3L @ night    Oxygen only not helium    polyethylene glycol (MIRALAX) 17 g packet Take 17 g by mouth daily    pregabalin (LYRICA) 150 MG capsule Take 1 capsule by mouth in the morning and 1 capsule around 1 PM    Respiratory Therapy Supplies ([facility] CPAP FILTER) MISC     rOPINIRole (REQUIP) 0.5 MG tablet Take 1 tablet (0.5 mg) by mouth At Bedtime Take 1 tab by mouth at bedtime.    SAXENDA 18 MG/3ML pen INJECT 3 MG SUBCUTANEOUS DAILY    senna-docusate (SENOKOT-S/PERICOLACE) 8.6-50 MG tablet Take 1 tablet by mouth 2 times daily    sulfamethoxazole-trimethoprim (BACTRIM DS) 800-160 MG tablet TAKE 1 TABLET BY MOUTH EVERY DAY AS ONE DOSE    vitamin D3 (CHOLECALCIFEROL) 250 mcg (15580 units) capsule Take 1 capsule by mouth daily    vitamin E (TOCOPHEROL) 1000 units (450 mg) CAPS capsule Take 1,000 Units by mouth daily    acetaminophen (TYLENOL) 325 MG tablet Take 2 tablets (650 mg) by mouth every 4 hours as needed for pain (Patient not taking: Reported on 4/21/2023)    diazepam (VALIUM) 5 MG tablet Take 1 tab as directed by MRI staff. May repeat Once as needed (Patient not taking: Reported on 3/14/2023)    hydrOXYzine (ATARAX) 25 MG tablet Take 1 tablet (25 mg) by mouth every 6 hours as needed for other (adjuvant pain) (Patient not taking: Reported on 4/21/2023)    methotrexate sodium 2.5 MG TABS TAKE 9 TABLETS (22.5 MG) BY MOUTH ONCE A WEEK (Patient not taking: Reported on 3/2/2023)    methylPREDNISolone (MEDROL DOSEPAK) 4 MG tablet therapy pack Follow Package Directions (Patient not taking: Reported on 4/21/2023)    oxyCODONE (ROXICODONE) 5 MG tablet Take 1 tablet (5 mg) by mouth every 12 hours as needed for severe pain (7-10) (wean off as pain improves) (Patient not taking: Reported on 4/21/2023)    polyethylene glycol (MIRALAX) 17 GM/Dose powder  (Patient not taking: Reported on 4/21/2023)    predniSONE  (DELTASONE) 5 MG tablet Take 20mg (4 tablets) daily for 1 week, then 15mg (3 tablets) daily for 1 week, then 10mg (2 tablets) daily for 1 week, then 5mg (1 tablet) daily for 1 week, then off. (Patient not taking: Reported on 4/21/2023)     No current facility-administered medications for this visit.     Facility-Administered Medications Ordered in Other Visits   Medication    Lidocaine 1 % injection 0.5-5 mL    sodium chloride (PF) 0.9% PF flush 5-50 mL               Past  Surgical History:     Past Surgical History:   Procedure Laterality Date    CERVICAL FUSION      COLONOSCOPY      COLONOSCOPY N/A 02/06/2020    Procedure: COLONOSCOPY, WITH POLYPECTOMY AND BIOPSY;  Surgeon: Julian Mccullough MD;  Location: [facility]    CYSTOSCOPY      ENT SURGERY      ESOPHAGOSCOPY, GASTROSCOPY, DUODENOSCOPY (EGD), COMBINED N/A 02/06/2020    Procedure: ESOPHAGOGASTRODUODENOSCOPY (EGD);  Surgeon: Julian Mccullough MD;  Location: [facility]    EXCISE LESION INTRAORAL Bilateral 10/03/2018    Procedure: EXCISE LESION INTRAORAL;  Wide Local Excision Of of Left Oral Cavity Ulcer;  Surgeon: Morro Mijares MD;  Location: [facility] OR    HC DRAIN SKIN ABSCESS SIMPLE/SINGLE  03/16/2012    Procedure:INCISION AND DRAINAGE, ABSCESS, SIMPLE; Surgeon:CHRISTIANO HANCOCK; Location: GI    HEAD & NECK SURGERY      HYSTERECTOMY      HYSTERECTOMY      INCISION AND DRAINAGE ABDOMEN WASHOUT, COMBINED      INJECT EPIDURAL CERVICAL N/A 10/14/2021    Procedure: Cervical 7- Thoracic 1 epidural steroid injection with fluoroscopy;  Surgeon: Tram Florian MD;  Location: [facility] OR    INJECT EPIDURAL LUMBAR Right 09/15/2020    Procedure: Lumbar5- sacral 1 epidural steroid injection with fluoroscopy;  Surgeon: Tram Florian MD;  [facility]    INJECT EPIDURAL LUMBAR Right 06/29/2021    Procedure: Lumbar 5 sacral 1 epidural steroid injection with fluoroscopy;  Surgeon: Tram Florian MD;  Location: [facility] OR    INJECT SACROILIAC JOINT Bilateral  "06/16/2020    Procedure: Bilateral sacroiliac joint steroid injection with fluoroscopy;  Surgeon: Tram Florian MD;  [facility]    LAMINECTOMY THORACIC ONE LEVEL N/A 08/19/2019    Procedure: LAMINECTOMY, SPINE, THORACIC, 11-12 and Part of Lumbar 1, DRAINAGE OF EPIDURAL ABCESS, Epidural Drain Placement X 2;  Surgeon: Yadiel Beal MD;  Location: [facility] OR    OPTICAL TRACKING SYSTEM FUSION SPINE POSTERIOR LUMBAR THREE+ LEVELS N/A 11/30/2022    Procedure: Posterior Instrumented Spinal Fusion Thoracic 8 to Sacrum with Bilateral Pelvic Fixation; Transforaminal Lumbar Interbody Fusion with Erickson-Nixon Osteotomy Lumbar 1 to Sacral 1 (5 levels); Use of Infuse Bone Morphogenetic Protein Large Kit and Allograft;  Surgeon: Philip Wilhelm MD;  Location: [facility] OR    MA PERCUT IMPLNT NEUROELECT,EPIDURAL N/A 08/08/2019    Procedure: TRIAL, SPINAL CORD STIMULATOR WITH BOSTON SCIENTIFIC;  Surgeon: Sipple, Daniel Peter, DO;  Location: [facility];  Service: Pain    RADIO FREQUENCY ABLATION / DESTRUCTION OF SACROILOAC JOINT DORSAL PRIMARY RAMUS Bilateral 12/17/2019    Procedure: Bilateral lumbar radiofrequency ablation with fluoroscopy and intravenous sedation ( Lumbar 2,3,4,5 medial branch nerves for the bilateral lumbar3-4, 4-5 and 5-sacral1 joints.;  Surgeon: Tram Florian MD;  Location:  OR    RADIO FREQUENCY ABLATION / DESTRUCTION OF SACROILOAC JOINT DORSAL PRIMARY RAMUS Right 11/17/2020    Procedure: Right lumbar medial branch nerve radiofrequency ablation right L2,3,4,5 nerves supplying the right L3-4, L4-5 and L5-S1 facet joints;  Surgeon: Tram Florian MD;  Location: [facility] OR    spinal cord stimulator  08/08/2019    spinal cord stimulator removal  08/13/2019             Physical Exam:     Vitals:    04/21/23 1025   BP: 114/80   BP Location: Right arm   Pulse: 89   SpO2: 98%   Weight: 91.2 kg (201 lb)   Height: 1.626 m (5' 4\")     PSYCH: NAD  EYES: EOMI  NEURO: AAO x3    LABS:  PVR " 53mL    Creatinine   Date Value Ref Range Status   03/03/2023 0.93 0.51 - 0.95 mg/dL Final   07/10/2021 1.01 0.52 - 1.04 mg/dL Final            Assessment and Plan:   53 year old is a pleasant female who has cystocele/recotcele repair in 1997, urinary frequency, urinary hesitancy, voiding dysfunction and pelvic floor dysfunction.    - PVR WNL today.  - Discussed with patient possibly repeating VUDS, however patient would like to hold off on this. Unable to do PFPT as continues to go to PT for her back rehab.   - Unclear if having OAB symptoms or intermittent incomplete emptying. Taught CIC today and advised to perform CIC prn when feels like she can't empty. Advised to record her void and PVR amounts and report to me via [employer] [facility] in 2 weeks.  - Encourage voiding every 2-3 hours during the day and taking her time when voiding.   - Consider OAB medication if PVRs WNL as could likely be urinary urgency  - If PVRs elevated, consider repeating UDS.  - Avoid bladder irritants.  - After discussing the assessment and plan with patient, patient verbalizes understanding and agrees to the above plan. All questions answered.       Other orders as below:  Orders Placed This Encounter   Procedures    MEASURE POST-VOID RESIDUAL URINE/BLADDER CAPACITY, US NON-IMAGING (63061)     30 minutes spent on the date of the encounter doing chart review, review of outside records, review of test results, interpretation of tests, patient visit and documentation.      Danni Dai PA-C  Urology  April 21, 2023      Patient Care Team:  Neena Tam APRN CNP as PCP - General (Internal Medicine)  Care, [facility] ([facility] ([place]  Sumaya Bustillo PA as Physician Assistant (Physician Assistant)  Julian Mccullough MD as MD (Gastroenterology)  Panchito Saenz MD as Assigned Rheumatology Provider  Neena Tam APRN CNP as Assigned PCP  Leodan Gan MD as Resident (Student in organized  health care education/training program)  Morro Mijares MD as Assigned Surgical Provider  Trent Rahman MD as MD (Gastroenterology)  Mick Avalos Jr., MD as Resident (Internal Medicine)  Mai Sotomayor PA-C as Physician Assistant (Pediatric Critical Care Medicine)  Power Ojeda MD as MD (Physical Medicine and Rehabilitation)  Philip Wilhelm MD as Assigned Musculoskeletal Provider  Mikayla Morin, PhD as Assigned Behavioral Health Provider  Trent Rahman MD as Assigned Gastroenterology Provider  Jamaal Dorsey MD as MD (OB/Gyn)  Naya Melendez, PharmD as MTM Pharamcist (Pharmacist)  Yeimy Guerra DO as Assigned Neuroscience Provider  Dior Arroyo PA-C as Assigned Cancer Care Provider  Naya Melendez, PharmD as Assigned MTM Pharmacist  Neena Tam, APRN CNP as Assigned Pain Medication Provider  EDGAR YEAGER

## 2023-04-30 DIAGNOSIS — E66.01 CLASS 2 SEVERE OBESITY WITH SERIOUS COMORBIDITY AND BODY MASS INDEX (BMI) OF 35.0 TO 35.9 IN ADULT, UNSPECIFIED OBESITY TYPE (H): ICD-10-CM

## 2023-04-30 DIAGNOSIS — E66.812 CLASS 2 SEVERE OBESITY WITH SERIOUS COMORBIDITY AND BODY MASS INDEX (BMI) OF 35.0 TO 35.9 IN ADULT, UNSPECIFIED OBESITY TYPE (H): ICD-10-CM

## 2023-04-30 RX ORDER — LIRAGLUTIDE 6 MG/ML
INJECTION, SOLUTION SUBCUTANEOUS
Qty: 30 ML | Refills: 3 | Status: SHIPPED | OUTPATIENT
Start: 2023-04-30 | End: 2023-05-01

## 2023-04-30 NOTE — TELEPHONE ENCOUNTER
"Routing refill request to provider for review/approval because:  No Dx of diabetes    Last Written Prescription Date:  2/12/2023  Last Fill Quantity: 15,  # refills: 1   Last office visit provider:  2/3/2023     Requested Prescriptions   Pending Prescriptions Disp Refills     SAXENDA 18 MG/3ML pen [Pharmacy Med Name: SAXENDA 18 MG/3 ML PEN]  1     Sig: INJECT 3 MG SUBCUTANEOUS DAILY       GLP-1 Agonists Protocol Failed - 4/30/2023  1:18 PM        Failed - Order for Saxenda with diagnosis of diabetes        Passed - HgbA1C in past 3 or 6 months     If HgbA1C is 8 or greater, it needs to be on file within the past 3 months.  If less than 8, must be on file within the past 6 months.     Recent Labs   Lab Test 03/14/23  1335 07/07/22  0846   A1C  --  5.3   HEMOGLOBINA1 5.3  --              Passed - Medication is active on med list        Passed - Patient is age 18 or older        Passed - No active pregnancy on record        Passed - Normal serum creatinine on file in past 12 months     Recent Labs   Lab Test 03/03/23  1022   CR 0.93       Ok to refill medication if creatinine is low          Passed - No positive pregnancy test in past 12 months        Passed - Recent (6 mo) or future (30 days) visit within the authorizing provider's specialty     Patient had office visit in the last 6 months or has a visit in the next 30 days with authorizing provider.  See \"Patient Info\" tab in inbasket, or \"Choose Columns\" in Meds & Orders section of the refill encounter.                 Idalmis Baker RN 04/30/23 1:19 PM  "

## 2023-05-03 ENCOUNTER — VIRTUAL VISIT (OUTPATIENT)
Dept: SURGERY | Facility: CLINIC | Age: 53
End: 2023-05-03
Payer: COMMERCIAL

## 2023-05-03 DIAGNOSIS — E66.812 CLASS 2 OBESITY DUE TO EXCESS CALORIES WITHOUT SERIOUS COMORBIDITY WITH BODY MASS INDEX (BMI) OF 35.0 TO 35.9 IN ADULT: ICD-10-CM

## 2023-05-03 DIAGNOSIS — E74.39 GLUCOSE INTOLERANCE: Primary | ICD-10-CM

## 2023-05-03 DIAGNOSIS — E66.09 CLASS 2 OBESITY DUE TO EXCESS CALORIES WITHOUT SERIOUS COMORBIDITY WITH BODY MASS INDEX (BMI) OF 35.0 TO 35.9 IN ADULT: ICD-10-CM

## 2023-05-03 DIAGNOSIS — Z71.3 NUTRITIONAL COUNSELING: ICD-10-CM

## 2023-05-03 PROCEDURE — 99441 PR PHYSICIAN TELEPHONE EVALUATION 5-10 MIN: CPT | Mod: 93 | Performed by: DIETITIAN, REGISTERED

## 2023-05-03 NOTE — LETTER
5/3/2023         RE: Zayra Ramirez  [address] [place] [address]        Dear Colleague,    Thank you for referring your patient, Zayra Ramirez, to the [facility] [place]. Please see a copy of my visit note below.    Zayra Ramirez is a 53 year old who is being evaluated via a billable telephone visit.      How would you like to obtain your AVS? MyChart  If the video visit is dropped, the invitation should be resent by: Send to e-mail at: [email or web address]  Will anyone else be joining your video visit? No    Had to switch to phone visit due to no sound available for video.     Medical  Weight Loss Follow-Up Diet Evaluation  Assessment:  Zayra is presenting today for a follow up weight management nutrition consultation.  This patient has had an initial appointment and was referred by Dr. Hong.  for MNT as treatment for Obesity which is impacting her glucose intolerance.     Weight loss medication: Saxenda.   Pt's weight is 207 lbs  Initial weight: 219 lbs   Weight change: 12 lbs (up 7 lbs in the past month)         View : No data to display.              BMI: There is no height or weight on file to calculate BMI.  Ideal body weight: 54.7 kg (120 lb 9.5 oz)  Adjusted ideal body weight: 69.3 kg (152 lb 12.1 oz)    Estimated RMR ([place]-St Jeor equation):   1538 kcals x 1.3 (light active) = 1999 kcals (for weight maintenance)     Recommended Protein Intake: 60-80 grams of protein/day  Patient Active Problem List:  Patient Active Problem List   Diagnosis     Chronic bilateral low back pain without sciatica     Facial cellulitis     Class 2 severe obesity with serious comorbidity and body mass index (BMI) of 35.0 to 35.9 in adult, unspecified obesity type (H)     Dental abscess     Hypoxia     Subcutaneous emphysema (H)     Borderline personality disorder (H)     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)     Esophageal stricture      Obstruction of esophagus     HTN (hypertension)     Interstitial lung disease (H)     Moderate persistent asthma without complication     Onychomycosis     Restless leg syndrome     Seasonal allergies     Stress     Respiratory bronchiolitis interstitial lung disease (H)     Spinal epidural abscess     Lumbar spondylosis     Inflammatory arthritis     Arthralgia of temporomandibular joint     Articular disc disorder of temporomandibular joint     Derangement of temporomandibular joint     Calculus of gallbladder without cholecystitis without obstruction     Displacement of articular disc of temporomandibular joint with reduction     Myofascial pain     Oral lesion     Symptomatic cholelithiasis     Sacro-iliac pain     Degenerative lumbar spinal stenosis     Glucose intolerance     Chronic neck pain     Lumbar radiculopathy, chronic     Cervical radiculopathy     Acute pain of right shoulder     Other osteoporosis without current pathological fracture     Diaphragmatic hernia     Bipolar 2 disorder (H)     Choking sensation     Gastro-esophageal reflux disease with esophagitis     Limited opening of mandible     History of pelvic surgery     Voiding dysfunction     Pelvic floor dysfunction     Urinary hesitancy     Urinary frequency     S/P spinal fusion       Progress on goals from last visit: did recently go on vacation and did not have her medication with her, therefore she did notice eating larger amounts and appetite was not as suppressed, now that she is back, she has been working on [employer] back on track.     Dietary Recall:  Breakfast: eggs   Lunch:small snack-fruit and nuts   Dinner:protein, rice more so lately, vegetable   Typical snacks: vegetable or an apple in the afternoon, trail mix in the evening   Beverages: water at least 80 oz/day, Diet Mt Dew 2-3 times/day, Tea, Coffee 1-2 cups/day   Exercise: walking the dogs, starting gardening as well for work     Nutrition Diagnosis:    Obesity (NC 3.3)  related to overeating and poor lifestyle habits as evidenced by patient's subjective statements and BMI of 35.6 kg/m2.   Intervention:  1. Food and/or nutrient delivery: balanced meals, adequate protein   2. Nutrition education: none   3. Nutrition counseling: provided support and encouragement     Monitoring/Evaluation:    Goals:  1. Focus on protein first at each meal, aiming for 60-80 grams of protein/day.   2. Work on increased exercise throughout the week, aiming for 150 minutes/week.    Patient to follow up in 2 month(s) with RD        Phone call duration: 10 minutes        Originating Location (pt. Location): Home        Distant Location (provider location):  Off-site      Saumya Dobbins RD            Again, thank you for allowing me to participate in the care of your patient.        Sincerely,        Saumya Dobbins RD

## 2023-05-03 NOTE — PROGRESS NOTES
Zayra Ramirez is a 53 year old who is being evaluated via a billable telephone visit.      How would you like to obtain your AVS? MyChart  If the video visit is dropped, the invitation should be resent by: Send to e-mail at: [email or web address]  Will anyone else be joining your video visit? No    Had to switch to phone visit due to no sound available for video.     Medical  Weight Loss Follow-Up Diet Evaluation  Assessment:  Zayra is presenting today for a follow up weight management nutrition consultation.  This patient has had an initial appointment and was referred by Dr. Hong.  for MNT as treatment for Obesity which is impacting her glucose intolerance.     Weight loss medication: Saxenda.   Pt's weight is 207 lbs  Initial weight: 219 lbs   Weight change: 12 lbs (up 7 lbs in the past month)         View : No data to display.              BMI: There is no height or weight on file to calculate BMI.  Ideal body weight: 54.7 kg (120 lb 9.5 oz)  Adjusted ideal body weight: 69.3 kg (152 lb 12.1 oz)    Estimated RMR ([place]-St Jeor equation):   1538 kcals x 1.3 (light active) = 1999 kcals (for weight maintenance)     Recommended Protein Intake: 60-80 grams of protein/day  Patient Active Problem List:  Patient Active Problem List   Diagnosis     Chronic bilateral low back pain without sciatica     Facial cellulitis     Class 2 severe obesity with serious comorbidity and body mass index (BMI) of 35.0 to 35.9 in adult, unspecified obesity type (H)     Dental abscess     Hypoxia     Subcutaneous emphysema (H)     Borderline personality disorder (H)     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)     Esophageal stricture     Obstruction of esophagus     HTN (hypertension)     Interstitial lung disease (H)     Moderate persistent asthma without complication     Onychomycosis     Restless leg syndrome     Seasonal allergies     Stress     Respiratory bronchiolitis interstitial lung disease (H)     Spinal  epidural abscess     Lumbar spondylosis     Inflammatory arthritis     Arthralgia of temporomandibular joint     Articular disc disorder of temporomandibular joint     Derangement of temporomandibular joint     Calculus of gallbladder without cholecystitis without obstruction     Displacement of articular disc of temporomandibular joint with reduction     Myofascial pain     Oral lesion     Symptomatic cholelithiasis     Sacro-iliac pain     Degenerative lumbar spinal stenosis     Glucose intolerance     Chronic neck pain     Lumbar radiculopathy, chronic     Cervical radiculopathy     Acute pain of right shoulder     Other osteoporosis without current pathological fracture     Diaphragmatic hernia     Bipolar 2 disorder (H)     Choking sensation     Gastro-esophageal reflux disease with esophagitis     Limited opening of mandible     History of pelvic surgery     Voiding dysfunction     Pelvic floor dysfunction     Urinary hesitancy     Urinary frequency     S/P spinal fusion       Progress on goals from last visit: did recently go on vacation and did not have her medication with her, therefore she did notice eating larger amounts and appetite was not as suppressed, now that she is back, she has been working on [employer] back on track.     Dietary Recall:  Breakfast: eggs   Lunch:small snack-fruit and nuts   Dinner:protein, rice more so lately, vegetable   Typical snacks: vegetable or an apple in the afternoon, trail mix in the evening   Beverages: water at least 80 oz/day, Diet Mt Dew 2-3 times/day, Tea, Coffee 1-2 cups/day   Exercise: walking the dogs, starting gardening as well for work     Nutrition Diagnosis:    Obesity (NC 3.3) related to overeating and poor lifestyle habits as evidenced by patient's subjective statements and BMI of 35.6 kg/m2.   Intervention:  1. Food and/or nutrient delivery: balanced meals, adequate protein   2. Nutrition education: none   3. Nutrition counseling: provided support and  encouragement     Monitoring/Evaluation:    Goals:  1. Focus on protein first at each meal, aiming for 60-80 grams of protein/day.   2. Work on increased exercise throughout the week, aiming for 150 minutes/week.    Patient to follow up in 2 month(s) with RD        Phone call duration: 10 minutes        Originating Location (pt. Location): Home        Distant Location (provider location):  Off-site      Saumya Dobbins RD

## 2023-05-09 ENCOUNTER — THERAPY VISIT (OUTPATIENT)
Dept: PHYSICAL THERAPY | Facility: CLINIC | Age: 53
End: 2023-05-09
Payer: COMMERCIAL

## 2023-05-09 DIAGNOSIS — Z98.1 S/P SPINAL FUSION: ICD-10-CM

## 2023-05-09 DIAGNOSIS — M54.16 CHRONIC LUMBAR RADICULOPATHY: Primary | ICD-10-CM

## 2023-05-09 DIAGNOSIS — M54.50 LUMBAGO: ICD-10-CM

## 2023-05-09 PROCEDURE — 97110 THERAPEUTIC EXERCISES: CPT | Mod: GP | Performed by: PHYSICAL THERAPIST

## 2023-05-09 NOTE — PROGRESS NOTES
[facility]    OUTPATIENT Physical Therapy ORTHOPEDIC EVALUATION  PLAN OF TREATMENT FOR OUTPATIENT REHABILITATION  (COMPLETE FOR INITIAL CLAIMS ONLY)  Patient's Last Name, First Name, M.I.  YOB: 1970  Zayra Ramirez    Provider s Name:  [facility]   Medical Record No.  [id number]   Start of Care Date:  01/18/23   Onset Date:  11/30/2311/30/23   Treatment Diagnosis:  Chronic lumbar pain, aftercare sp T8-pelvis spinal fusion Medical Diagnosis:  Data Unavailable       Goals:     05/09/23 0500   Body Part   Goals listed below are for lumbar   Goal #1   Goal #1 squatting/kneeling   Previous Functional Level No restrictions   Current Functional Level Can do a partial squat   Performance Level with poor form   STG Target Performance Do a partial squat   Performance Level with good form pain free   Rationale for proper body mechanics when lifting, personal hygiene, dressing;for proper body mechanics while performing yardwork and home maintenance;for proper body mechanics while performing housework;for job requirements in their work place   Due date 02/08/23   Date Goal Met 02/15/23   LTG Target Performance Do a full squat   Performance Level unrestricted pain free   Rationale for proper body mechanics when lifting, personal hygiene, dressing;for proper body mechanics while performing yardwork and home maintenance;for job requirements in their work place;for proper body mechanics while performing housework   Due date 04/12/23   Date Goal Met 04/07/23   Goal #2   Goal #2 ambulation   Previous Functional Level No restrictions   Current Functional Level Minutes patient can walk   Performance Level 15 minutes with brace on with 2/10 pain   STG Target Performance Minutes patient will be able to walk   Performance Level > 30 minutes with less than 2/10 pain   Rationale for safe  household ambulation;for safe outdoor household ambulation;for safe community ambulation;for safe work place ambulation;to maintain proper body mechanics/posture while ambulating to avoid additional compensatory injury due to improper gait mechanics;to promote a healthy and active lifestyle   Due Date 02/08/23   Date Goal Met 02/15/23    LTG Target Performance Hours patient will be able to walk   Performance Level unrestricted pain free   Rationale for safe community ambulation;for safe outdoor household ambulation;for safe household ambulation;for safe work place ambulation;to maintain proper body mechanics/posture while ambulating to avoid additional compensatory injury due to improper gait mechanics;to promote a healthy and active lifestyle   Due Date 04/12/23   Date Goal Met 05/09/23         Therapy Frequency:  1 final visit today  Predicted Duration of Therapy Intervention:  1 week    Willem Gustafson, PT                 I CERTIFY THE NEED FOR THESE SERVICES FURNISHED UNDER        THIS PLAN OF TREATMENT AND WHILE UNDER MY CARE     (Physician attestation of this document indicates review and certification of the therapy plan).                     Certification Date From:  05/09/23   Certification Date To:  05/09/23    Referring Provider:  Philip Wilhelm    Initial Assessment        See Epic Evaluation SOC Date: 01/18/23

## 2023-05-09 NOTE — PROGRESS NOTES
DISCHARGE REPORT    Progress reporting period is from 1/28/23 to 5/9/23.       SUBJECTIVE  Subjective changes noted by patient:  Subjective: Pt reports that things are going great with overall. No pain anymore and is keeping up with most of her daily/work activities without issue. Working on the exercises as able. Feeling in control of her back again now.    Current pain level is 0/10 Current Pain level: 0/10.     Previous pain level was  2/10 Initial Pain level: 2/10.   Changes in function:  Yes (See Goal flowsheet attached for changes in current functional level)  Adverse reaction to treatment or activity: None    OBJECTIVE  Changes noted in objective findings:  Yes, improved ROM, Strength, function, posture, and control  Objective: good seated posture, no limp with gait withotu brace, good squat, 5/5 LE strength     ASSESSMENT/PLAN  Updated problem list and treatment plan: Diagnosis 1:  Chronic lumbar pain, aftercare s/p Lumbar fusion  Impaired muscle performance - neuro re-education  STG/LTGs have been met or progress has been made towards goals:  Yes (See Goal flow sheet completed today.)  Assessment of Progress: The patient has met all of their long term goals.  Self Management Plans:  Patient has been instructed in a home treatment program.  Patient is independent in a home treatment program.  Patient  has been instructed in self management of symptoms.  Patient is independent in self management of symptoms.  I have re-evaluated this patient and find that the nature, scope, duration and intensity of the therapy is appropriate for the medical condition of the patient.  Zayra continues to require the following intervention to meet STG and LTG's:  PT intervention is no longer required to meet STG/LTG.    Recommendations:  This patient is ready to be discharged from therapy and continue their home treatment program.    Please refer to the daily flowsheet for treatment today, total treatment time and time spent  performing 1:1 timed codes.    Inquires  Willem Gustafson PT, DPT, CSCS  Physical Therapist  [facility]  [id number] [place] [address], [address] [address]  [phone]

## 2023-05-10 ENCOUNTER — TELEPHONE (OUTPATIENT)
Dept: UROLOGY | Facility: CLINIC | Age: 53
End: 2023-05-10
Payer: COMMERCIAL

## 2023-05-10 NOTE — TELEPHONE ENCOUNTER
Called patient again and informed of PA's recommendation. Patient is interested in scheduling an appointment with a nurse for PVR. Patient was transferred to scheduling to arrange a visit.     Susan Mason LPN

## 2023-05-10 NOTE — TELEPHONE ENCOUNTER
----- Message from Danni Dai PA-C sent at 5/10/2023  9:34 AM CDT -----  Regarding: RE: CIC  Please see if interested in PVR check on RN visit to ensure PVR still WNL.     shirin  ----- Message -----  From: Susan Mason LPN  Sent: 5/10/2023   9:26 AM CDT  To: Danni Dai PA-C  Subject: FW: CIC                                          Called patient and she stated that she was unsuccessful catheterizing herself, but since her appointment she feels like her symptoms are better. She is emptying her bladder according to patient.   Thanks!  ~Susan   ----- Message -----  From: Danni Dai PA-C  Sent: 5/9/2023   5:16 PM CDT  To: [facility]  Subject: CIC                                              At last encounter 2 weeks ago it was unclear if having OAB symptoms or intermittent incomplete emptying. Taught CIC that day and advised to perform CIC prn when feels like she can't empty. Advised to record her void and PVR amounts and report to me via [employer] in 2 weeks. I have not seen a message from her yet. Please reach out to patient for void/pvr amounts.    shirin

## 2023-05-19 ENCOUNTER — ALLIED HEALTH/NURSE VISIT (OUTPATIENT)
Dept: UROLOGY | Facility: CLINIC | Age: 53
End: 2023-05-19
Payer: COMMERCIAL

## 2023-05-19 DIAGNOSIS — N39.8 VOIDING DYSFUNCTION: Primary | ICD-10-CM

## 2023-05-19 LAB — RESIDUAL VOLUME (RV) (EXTERNAL): 28

## 2023-05-19 PROCEDURE — 51798 US URINE CAPACITY MEASURE: CPT

## 2023-05-19 NOTE — PROGRESS NOTES
Zayra Ramirez comes into clinic today at the request of Marni Dai Ordering Provider for PVR.    Patient has not been able to self-cath due to inability to bend down enough after back surgery.  Feels as though she is emptying well in her own though.    PVR: 28 mL by bladder scan     This service provided today was under the supervising provider of the day Dr. Arteaga, who was available if needed.    Lisette Lake, EMT

## 2023-05-22 DIAGNOSIS — M54.12 CERVICAL RADICULOPATHY: Primary | ICD-10-CM

## 2023-05-25 ENCOUNTER — OFFICE VISIT (OUTPATIENT)
Dept: ORTHOPEDICS | Facility: CLINIC | Age: 53
End: 2023-05-25
Payer: COMMERCIAL

## 2023-05-25 ENCOUNTER — ANCILLARY PROCEDURE (OUTPATIENT)
Dept: GENERAL RADIOLOGY | Facility: CLINIC | Age: 53
End: 2023-05-25
Attending: ORTHOPAEDIC SURGERY
Payer: COMMERCIAL

## 2023-05-25 DIAGNOSIS — Z98.1 STATUS POST THORACIC SPINAL FUSION: Primary | ICD-10-CM

## 2023-05-25 DIAGNOSIS — M54.12 CERVICAL RADICULOPATHY: ICD-10-CM

## 2023-05-25 PROCEDURE — 72040 X-RAY EXAM NECK SPINE 2-3 VW: CPT | Performed by: RADIOLOGY

## 2023-05-25 PROCEDURE — 72082 X-RAY EXAM ENTIRE SPI 2/3 VW: CPT | Performed by: STUDENT IN AN ORGANIZED HEALTH CARE EDUCATION/TRAINING PROGRAM

## 2023-05-25 PROCEDURE — 99213 OFFICE O/P EST LOW 20 MIN: CPT | Performed by: ORTHOPAEDIC SURGERY

## 2023-05-25 NOTE — LETTER
5/25/2023         RE: Zayra PADRON James  [address] [place] [address]        Dear Colleague,    Thank you for referring your patient, Zayra Ramirez, to the [facility] [place]. Please see a copy of my visit note below.    Spine Surgical Hx:  10/16/2017 - ACDF with plate fixation C3-C5 (2 levels); use of Cornerstone allograft (Dr. Rylan Monique, [facility]).  [Implants: Medtronic Zevo plate].  08/08/2019 - SCS implantation ([place] OR); complicated by epidural abscess MSSA culture positive, treated with drainage/laminectomy and IV antibiotics.  Thus, the SCS was removed.  08/19/2019 - Laminectomies T11-L1 (T12-L2) ([facility]).  11/30/2022 - PISF T8-pelvis with bilateral stacked pelvic fixation; TLIF-SPO L1-S1 (5 levels); use of Infuse BMP and allograft (Choco/Shavon) for multilevel sponydylosis and stenosis, thoracolumbar junction kyphosis.  [Implants: Medtronic Solera, 5.5 mm CoCr rods x4 (UNID rods x2); Capstone Control PTC cages; SI-Bone Granite screws x 4].     BMD Hx:  11/24/21 - DEXA spine/hip/wrist.  T-score = -1.1 (Left femoral neck).  Imp: Osteopenia.  12/8/21 - Saw Dr. Guerra (PM&R).  P> RTC 6 wks; had not been seen since.      In-Person Visit    Chief Complaint   Patient presents with    Surgical Followup     DOS: 11/30/22 Posterior instrumented spinal fusion Thoracic 8 to sacrum, with bilateral pelvic fixation; Transforaminal lumbar interbody fusion with Erickson-Nixon osteotomy lumbar 1 to sacral 1 (5 levels); possible cement augmentation of screws; Use of Infuse bone morphogenetic protein Large kit and allograft.           S>  53 year old female, RHD, 6 mos postop; last seen at 2.5 mos.  Still reported numbness in Right C6/C8 distribution.  Also new weakness in L arm (elbow flexion).    Accompanied by her friend.  Per patient, she is doing well, and she is happy with the results of surgery.  Her low back pain is much better compared to  before.  Recently, she was told that she is leaning a little towards the right, and that her left shoulder is higher.  This does not seem to bother her that greatly, although she is concerned whether this would get further worse.    At her last visit, we also talked about her neck.  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  Per patient, her neck continues to bother her, and she still has tingling on her right hand, affecting mainly the index and long fingers.  This is not any worse or better compared to last visit.      Oswestry (KATIE) Questionnaire        5/25/2023     9:05 AM   OSWESTRY DISABILITY INDEX   Count 10   Sum 9   Oswestry Score (%) 18 %      KATIE preop          70%  6wk                    40%  3mo                   45%  6mo  18%      Neck Disability Index (NDI) Questionnaire        5/25/2023     9:09 AM   Neck Disability Index (NDI)   Neck Disability Index: Count 10   NDI: Total Score = SUM (points for all 10 findings) 17   Neck Disability in Percent = (Total Score) / 50 * 100 34 (%)      NDI 2/13/23       42.5%  NDI 5/25/23 34%      Visual Analog Pain Scale  Back Pain Scale 0-10: 1  Right leg pain: 0  Left leg pain: 0  Neck Pain Scale 0-10: 1  Right arm pain: 0  Left arm pain: 0    PROMIS-10 Scores  Global Mental Health Score: (P) 16  Global Physical Health Score: (P) 16  PROMIS TOTAL - SUBSCORES: (P) 32    O>   Alert, oriented x 3, cooperative.  Not in CP distress.  LMP  (LMP Unknown)   Surgical incision(s) well-healed, no sign of infection.  Ambulates independently.  Grossly neurologically intact.    Imaging:    Cervical flexion-extension x-rays taken today show persistent motion at the C3-4 level, and with vacuum signal within the C3-4 facet joints, consistent with pseudoarthrosis.  There is also spondylolisthesis C2-3.    EOS full body AP lateral standing x-rays taken today show stable T8 to pelvis posterior instrumentation, with 5  level interbody cage placement L1-S1.  No sign of fixation loosening or failure.  Maintained acceptable coronal and sagittal alignment at the operative levels.  There is mild form of proximal junctional kyphosis, but with no fracture or instability.    A>   53/f RHD with:  Thoracolumbar spine:  1.  6 mos s/p PISF T8-pelvis; TLIF-SPO L1-S1 (5 levels), doing well, with improved posture and preoperative pain.  Cervical spine:  1.  Pseudarthrosis s/p ACDF C3-4 and C4-5 (2017, Dr. Monique), with broken C3 screws and lack of bridging bone on CT scan (2/3/23), and motion demonstrated on flex-ext x-rays (2/16/23).  2.  Spondylolisthesis C2-3.  3. Advanced multilevel cervical facet arthropathy C2-T1.   4.  Chronic axial neck pain.  5.  Bilateral radicular arm pain.    P>    Had a good discussion with patient.  Congratulated her on her significant improvement from her surgery 6 months ago.  Her thoracolumbar back pain is much better now.  I reassured her regarding my imaging and clinical findings.  While she does have some tilting towards the right, and some shoulder asymmetry (left higher than right), this is pretty mild, and I reassured her it would not affect her functional status.  I also do not think it is at risk of becoming progressive.  When we analyzed her AP x-rays.  The operative levels were actually pretty straight.  And that the tilting towards the right is occurring more in the upper thoracic spine.  Regarding her cervical spine, she has pseudoarthrosis and with broken anterior screws.  I reassured her that the broken screws are completely embedded in bone, and is not at risk of migration.  Her spine likewise is not unstable, and thus she is not at risk of developing significant neurologic deficit or quadriplegia.  As such, the decision on whether to undergo another major spinal surgery to repair the pseudoarthrosis is a personal 1 and based on quality of life issues.  At the end of discussion, we agreed to hold  off against surgery at this time, let her enjoy her summer, and we will reevaluate at the 1 year osorio in November.  If she would need something done for her cervical spine, we may consider C2-C5 posterior instrumented fusion.  Because of the spondylolisthesis at C2-3, we may also need to include that in our construct.    Return to clinic in 6 months (1 year postoperative) with thoracic and lumbar CT for fusion status evaluation    15 minutes spent on the date of the encounter doing chart review/review of outside records/review of test results/interpretation of tests/patient visit/documentation/discussion with other provider(s)/discussion with patient and family.    Philip Wilhelm MD    Orthopaedic Spine Surgery  Dept Orthopaedic Surgery, formerly [facility]  [phone] office, [phone] pager  [email or web address]

## 2023-05-25 NOTE — PROGRESS NOTES
Spine Surgical Hx:  10/16/2017 - ACDF with plate fixation C3-C5 (2 levels); use of Cornerstone allograft (Dr. Rylan Monique, [facility]).  [Implants: Medtronic Zevo plate].  08/08/2019 - SCS implantation ([place] OR); complicated by epidural abscess MSSA culture positive, treated with drainage/laminectomy and IV antibiotics.  Thus, the SCS was removed.  08/19/2019 - Laminectomies T11-L1 (T12-L2) ([facility]  11/30/2022 - PISF T8-pelvis with bilateral stacked pelvic fixation; TLIF-SPO L1-S1 (5 levels); use of Infuse BMP and allograft (Choco/Shavon) for multilevel sponydylosis and stenosis, thoracolumbar junction kyphosis.  [Implants: Medtronic Solera, 5.5 mm CoCr rods x4 (UNID rods x2); Capstone Control PTC cages; SI-Bone Granite screws x 4].     BMD Hx:  11/24/21 - DEXA spine/hip/wrist.  T-score = -1.1 (Left femoral neck).  Imp: Osteopenia.  12/8/21 - Saw Dr. Guerra (PM&R).  P> RTC 6 wks; had not been seen since.      In-Person Visit    Chief Complaint   Patient presents with     Surgical Followup     DOS: 11/30/22 Posterior instrumented spinal fusion Thoracic 8 to sacrum, with bilateral pelvic fixation; Transforaminal lumbar interbody fusion with Erickson-Nixon osteotomy lumbar 1 to sacral 1 (5 levels); possible cement augmentation of screws; Use of Infuse bone morphogenetic protein Large kit and allograft.           S>  53 year old female, RHD, 6 mos postop; last seen at 2.5 mos.  Still reported numbness in Right C6/C8 distribution.  Also new weakness in L arm (elbow flexion).    Accompanied by her friend.  Per patient, she is doing well, and she is happy with the results of surgery.  Her low back pain is much better compared to before.  Recently, she was told that she is leaning a little towards the right, and that her left shoulder is higher.  This does not seem to bother her that greatly, although she is concerned whether this would get further worse.    At her last visit, we also talked about her neck.   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  Per patient, her neck continues to bother her, and she still has tingling on her right hand, affecting mainly the index and long fingers.  This is not any worse or better compared to last visit.      Oswestry (KATIE) Questionnaire        5/25/2023     9:05 AM   OSWESTRY DISABILITY INDEX   Count 10   Sum 9   Oswestry Score (%) 18 %      KATIE preop          70%  6wk                    40%  3mo                   45%  6mo  18%      Neck Disability Index (NDI) Questionnaire        5/25/2023     9:09 AM   Neck Disability Index (NDI)   Neck Disability Index: Count 10   NDI: Total Score = SUM (points for all 10 findings) 17   Neck Disability in Percent = (Total Score) / 50 * 100 34 (%)      NDI 2/13/23       42.5%  NDI 5/25/23 34%      Visual Analog Pain Scale  Back Pain Scale 0-10: 1  Right leg pain: 0  Left leg pain: 0  Neck Pain Scale 0-10: 1  Right arm pain: 0  Left arm pain: 0    PROMIS-10 Scores  Global Mental Health Score: (P) 16  Global Physical Health Score: (P) 16  PROMIS TOTAL - SUBSCORES: (P) 32    O>   Alert, oriented x 3, cooperative.  Not in CP distress.  LMP  (LMP Unknown)   Surgical incision(s) well-healed, no sign of infection.  Ambulates independently.  Grossly neurologically intact.    Imaging:    Cervical flexion-extension x-rays taken today show persistent motion at the C3-4 level, and with vacuum signal within the C3-4 facet joints, consistent with pseudoarthrosis.  There is also spondylolisthesis C2-3.    EOS full body AP lateral standing x-rays taken today show stable T8 to pelvis posterior instrumentation, with 5 level interbody cage placement L1-S1.  No sign of fixation loosening or failure.  Maintained acceptable coronal and sagittal alignment at the operative levels.  There is mild form of proximal junctional kyphosis, but with no fracture or instability.    A>   53/f RHD  with:  Thoracolumbar spine:  1.  6 mos s/p PISF T8-pelvis; TLIF-SPO L1-S1 (5 levels), doing well, with improved posture and preoperative pain.  Cervical spine:  1.  Pseudarthrosis s/p ACDF C3-4 and C4-5 (2017, Dr. Monique), with broken C3 screws and lack of bridging bone on CT scan (2/3/23), and motion demonstrated on flex-ext x-rays (2/16/23).  2.  Spondylolisthesis C2-3.  3. Advanced multilevel cervical facet arthropathy C2-T1.   4.  Chronic axial neck pain.  5.  Bilateral radicular arm pain.    P>    Had a good discussion with patient.  Congratulated her on her significant improvement from her surgery 6 months ago.  Her thoracolumbar back pain is much better now.  I reassured her regarding my imaging and clinical findings.  While she does have some tilting towards the right, and some shoulder asymmetry (left higher than right), this is pretty mild, and I reassured her it would not affect her functional status.  I also do not think it is at risk of becoming progressive.  When we analyzed her AP x-rays.  The operative levels were actually pretty straight.  And that the tilting towards the right is occurring more in the upper thoracic spine.  Regarding her cervical spine, she has pseudoarthrosis and with broken anterior screws.  I reassured her that the broken screws are completely embedded in bone, and is not at risk of migration.  Her spine likewise is not unstable, and thus she is not at risk of developing significant neurologic deficit or quadriplegia.  As such, the decision on whether to undergo another major spinal surgery to repair the pseudoarthrosis is a personal 1 and based on quality of life issues.  At the end of discussion, we agreed to hold off against surgery at this time, let her enjoy her summer, and we will reevaluate at the 1 year osorio in November.  If she would need something done for her cervical spine, we may consider C2-C5 posterior instrumented fusion.  Because of the spondylolisthesis at  C2-3, we may also need to include that in our construct.    Return to clinic in 6 months (1 year postoperative) with thoracic and lumbar CT for fusion status evaluation    15 minutes spent on the date of the encounter doing chart review/review of outside records/review of test results/interpretation of tests/patient visit/documentation/discussion with other provider(s)/discussion with patient and family.    Philip Wilhelm MD    Orthopaedic Spine Surgery  Dept Orthopaedic Surgery, [facility]  [phone] office, [phone] pager  [email or web address]

## 2023-06-21 DIAGNOSIS — E66.01 CLASS 2 SEVERE OBESITY WITH SERIOUS COMORBIDITY AND BODY MASS INDEX (BMI) OF 35.0 TO 35.9 IN ADULT, UNSPECIFIED OBESITY TYPE (H): ICD-10-CM

## 2023-06-21 DIAGNOSIS — E66.812 CLASS 2 SEVERE OBESITY WITH SERIOUS COMORBIDITY AND BODY MASS INDEX (BMI) OF 35.0 TO 35.9 IN ADULT, UNSPECIFIED OBESITY TYPE (H): ICD-10-CM

## 2023-06-21 NOTE — TELEPHONE ENCOUNTER
"Routing refill request to provider for review/approval because:  Drug not active on patient's medication list  Dx of DM on file    Last Written Prescription Date:  NA  Last Fill Quantity: NA,  # refills: NA   Last office visit provider: 2/3/23    Requested Prescriptions   Pending Prescriptions Disp Refills     SAXENDA 18 MG/3ML pen [Pharmacy Med Name: SAXENDA 18 MG/3 ML PEN]       Sig: INJECT 3 MG SUBCUTANEOUS DAILY FOR 30 DAYS (INJECT 0.5 ML DAILY)       GLP-1 Agonists Protocol Failed - 6/21/2023  4:40 PM        Failed - Order for Saxenda with diagnosis of diabetes        Failed - Medication is active on med list        Passed - HgbA1C in past 3 or 6 months     If HgbA1C is 8 or greater, it needs to be on file within the past 3 months.  If less than 8, must be on file within the past 6 months.     Recent Labs   Lab Test 03/14/23  1335 07/07/22  0846   A1C  --  5.3   HEMOGLOBINA1 5.3  --              Passed - Patient is age 18 or older        Passed - No active pregnancy on record        Passed - Normal serum creatinine on file in past 12 months     Recent Labs   Lab Test 03/03/23  1022   CR 0.93       Ok to refill medication if creatinine is low          Passed - No positive pregnancy test in past 12 months        Passed - Recent (6 mo) or future (30 days) visit within the authorizing provider's specialty     Patient had office visit in the last 6 months or has a visit in the next 30 days with authorizing provider.  See \"Patient Info\" tab in inbasket, or \"Choose Columns\" in Meds & Orders section of the refill encounter.                 Kelsey Lane RN 06/21/23 6:57 PM  "

## 2023-06-23 RX ORDER — LIRAGLUTIDE 6 MG/ML
INJECTION, SOLUTION SUBCUTANEOUS
Qty: 3 ML | Refills: 1 | Status: SHIPPED | OUTPATIENT
Start: 2023-06-23 | End: 2023-11-13

## 2023-06-23 NOTE — TELEPHONE ENCOUNTER
Left message to call back for: Patient  Information to relay to patient: See provider message below and help patient schedule. Once scheduled, please route back to provider pool for bridge if needed.

## 2023-07-05 ENCOUNTER — VIRTUAL VISIT (OUTPATIENT)
Dept: SURGERY | Facility: CLINIC | Age: 53
End: 2023-07-05
Payer: COMMERCIAL

## 2023-07-05 ENCOUNTER — VIRTUAL VISIT (OUTPATIENT)
Dept: FAMILY MEDICINE | Facility: CLINIC | Age: 53
End: 2023-07-05
Payer: COMMERCIAL

## 2023-07-05 DIAGNOSIS — E66.09 CLASS 1 OBESITY DUE TO EXCESS CALORIES WITHOUT SERIOUS COMORBIDITY WITH BODY MASS INDEX (BMI) OF 33.0 TO 33.9 IN ADULT: ICD-10-CM

## 2023-07-05 DIAGNOSIS — Z71.3 NUTRITIONAL COUNSELING: ICD-10-CM

## 2023-07-05 DIAGNOSIS — Z12.31 VISIT FOR SCREENING MAMMOGRAM: Primary | ICD-10-CM

## 2023-07-05 DIAGNOSIS — E74.39 GLUCOSE INTOLERANCE: Primary | ICD-10-CM

## 2023-07-05 DIAGNOSIS — E66.01 CLASS 2 SEVERE OBESITY WITH SERIOUS COMORBIDITY AND BODY MASS INDEX (BMI) OF 35.0 TO 35.9 IN ADULT, UNSPECIFIED OBESITY TYPE (H): ICD-10-CM

## 2023-07-05 DIAGNOSIS — E66.812 CLASS 2 SEVERE OBESITY WITH SERIOUS COMORBIDITY AND BODY MASS INDEX (BMI) OF 35.0 TO 35.9 IN ADULT, UNSPECIFIED OBESITY TYPE (H): ICD-10-CM

## 2023-07-05 DIAGNOSIS — E66.811 CLASS 1 OBESITY DUE TO EXCESS CALORIES WITHOUT SERIOUS COMORBIDITY WITH BODY MASS INDEX (BMI) OF 33.0 TO 33.9 IN ADULT: ICD-10-CM

## 2023-07-05 DIAGNOSIS — E66.811 CLASS 1 OBESITY WITH SERIOUS COMORBIDITY AND BODY MASS INDEX (BMI) OF 33.0 TO 33.9 IN ADULT, UNSPECIFIED OBESITY TYPE: ICD-10-CM

## 2023-07-05 PROCEDURE — 99213 OFFICE O/P EST LOW 20 MIN: CPT | Mod: VID | Performed by: FAMILY MEDICINE

## 2023-07-05 PROCEDURE — 97803 MED NUTRITION INDIV SUBSEQ: CPT | Mod: VID | Performed by: DIETITIAN, REGISTERED

## 2023-07-05 RX ORDER — CLONIDINE HYDROCHLORIDE 0.1 MG/1
1 TABLET ORAL PRN
COMMUNITY
Start: 2023-06-07

## 2023-07-05 RX ORDER — FOLIC ACID 1 MG/1
1 TABLET ORAL
COMMUNITY
Start: 2023-06-30 | End: 2023-09-25

## 2023-07-05 RX ORDER — ALBUTEROL SULFATE 0.83 MG/ML
SOLUTION RESPIRATORY (INHALATION)
COMMUNITY
Start: 2023-06-28

## 2023-07-05 ASSESSMENT — ASTHMA QUESTIONNAIRES: ACT_TOTALSCORE: 13

## 2023-07-05 NOTE — LETTER
7/5/2023         RE: Zayra Ramirez  [address] [place] [address]        Dear Colleague,    Thank you for referring your patient, Zayra Ramirez, to the [facility] [place]. Please see a copy of my visit note below.    Zayra Ramirez is a 53 year old who is being evaluated via a billable video visit.    How would you like to obtain your AVS? MyChart  If the video visit is dropped, the invitation should be resent by: Send to e-mail at: [email or web address]  Will anyone else be joining your video visit? No        Medical  Weight Loss Follow-Up Diet Evaluation  Assessment:  Zayra is presenting today for a follow up weight management nutrition consultation.  This patient has had an initial appointment and was referred by Dr. Hong for MNT as treatment for Obesity   Weight loss medication: Saxenda.   Pt's weight is 194 lbs   Initial weight: 219 lbs   Weight change: 25 lbs (down)        6/28/2023     8:50 AM   Changes and Difficulties   I have made the following changes to my diet since my last visit: Eat 2x day mostly   With regards to my diet, I am still struggling with: Eating 3 times a day   I have made the following changes to my activity/exercise since my last visit: Walking     BMI: There is no height or weight on file to calculate BMI.  Ideal body weight: 54.7 kg (120 lb 9.5 oz)  Adjusted ideal body weight: 69.3 kg (152 lb 12.1 oz)    Estimated RMR (Marcellus-St Jeor equation):   1479 kcals x 1.3 (light active) = 1923 kcals (for weight maintenance)     Recommended Protein Intake: 60-80 grams of protein/day  Patient Active Problem List:  Patient Active Problem List   Diagnosis     Chronic bilateral low back pain without sciatica     Facial cellulitis     Class 1 obesity with serious comorbidity and body mass index (BMI) of 33.0 to 33.9 in adult     Dental abscess     Hypoxia     Subcutaneous emphysema (H)     Borderline personality disorder (H)      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)     Esophageal stricture     Obstruction of esophagus     HTN (hypertension)     Interstitial lung disease (H)     Moderate persistent asthma without complication     Onychomycosis     Restless leg syndrome     Seasonal allergies     Stress     Respiratory bronchiolitis interstitial lung disease (H)     Spinal epidural abscess     Lumbar spondylosis     Inflammatory arthritis     Arthralgia of temporomandibular joint     Articular disc disorder of temporomandibular joint     Derangement of temporomandibular joint     Calculus of gallbladder without cholecystitis without obstruction     Displacement of articular disc of temporomandibular joint with reduction     Myofascial pain     Oral lesion     Symptomatic cholelithiasis     Sacro-iliac pain     Degenerative lumbar spinal stenosis     Glucose intolerance     Chronic neck pain     Lumbar radiculopathy, chronic     Cervical radiculopathy     Acute pain of right shoulder     Other osteoporosis without current pathological fracture     Diaphragmatic hernia     Bipolar 2 disorder (H)     Choking sensation     Gastro-esophageal reflux disease with esophagitis     Limited opening of mandible     History of pelvic surgery     Voiding dysfunction     Pelvic floor dysfunction     Urinary hesitancy     Urinary frequency     S/P spinal fusion       Progress on goals from last visit: continues to focus on protein at meals, however does find it challenging at times.  Trying to be more mindful in regards to feelings of hunger and fullness.      Dietary Recall:  Breakfast: banana or orange or eggs (1-2)   Lunch:Smoothie, nuts  Dinner:variety-vegetables, protein   Typical snacks: reduced significantly-maybe an orange in the evening   Beverages: water-at least 64 oz/day, Diet Pop 2-3 times/day (trying to limit to no more than 3 times/day)  Exercise: walking the dogs, increased walking at work in addition     Nutrition  Diagnosis:    Obesity (NC 3.3) related to overeating and poor lifestyle habits as evidenced by patient's subjective statements and BMI of 33.4 kg/m2.       Intervention:  1. Food and/or nutrient delivery: balanced meals, adequate protein   2. Nutrition education: protein intake   3. Nutrition counseling: provided support and encouragement     Monitoring/Evaluation:    Goals:  1. Work on adequate protein at each meal, aiming for at least 20-30 grams of protein/meal, 3 meals/day.   2. Try an unflavored protein powder added to smoothie for additional protein.     Patient to follow up in 2 month(s) with DANIEL        Video-Visit Details    Type of service:  Video Visit    Video Start Time (time video started): 2:49 pm     Video End Time (time video stopped): 2:59 pm     Originating Location (pt. Location): Home        Distant Location (provider location):  Off-site    Mode of Communication:  Video Conference via [facility]    Physician has received verbal consent for a Video Visit from the patient? Yes      Saumya Dobbins RD            Again, thank you for allowing me to participate in the care of your patient.        Sincerely,        Saumya Dobbins RD

## 2023-07-05 NOTE — PROGRESS NOTES
Zayra is a 53 year old who is being evaluated via a billable video visit.      How would you like to obtain your AVS? MyChart  If the video visit is dropped, the invitation should be resent by: Send to e-mail at: [email or web address]  Will anyone else be joining your video visit? No        Assessment & Plan   Problem List Items Addressed This Visit        Digestive    Class 1 obesity with serious comorbidity and body mass index (BMI) of 33.0 to 33.9 in adult     BMI IMPROVING Body mass index is 37.59 kg/m , now down to 33 with Saxenda 3.0mg     SEVERE OBESITY : Initial bmi is Body mass index is 37.59 kg/m .  With the following weight related comorbid medical conditions: Hypertension, asthma, GERD, gallstones, degenerative spinal stenosis (surgeon suggested weight loss followed by surgery) and chronic back pain.  As well as she is a smoker.  And has mental illness     Contraception - had hysterectomy in 1997.      Patient declined 24 week weight loss program due to cost.      Bariatric surgery was discussed but she is not interested and she has severe mental health issues which may make this very difficult.     Medications: saxenda 3mg    Current goal(s): cutting down on mountain dew (switched from sugar to no sugar and decreased from 6 per day to 3 per day) - noted 7/5/2023   Continue gardening       Follow up 1 month - 3 months     DISCUSSION _________________________________________________________________     Dx: Initial bmi is Body mass index is 37.59 kg/m .  With the following weight related comorbid medical conditions: Hypertension, asthma, GERD, gallstones, degenerative spinal stenosis (surgeon suggested weight loss followed by surgery) and chronic back pain.  As well as she is a smoker.  And has mental illness     BECAUSE OF ABOVE PROBLEMS IT IS STRONGLY ADVISED THAT Zayra LOSE WEIGHT.  BELOW IS MY PLAN FOR her      Start weight 219 lbs, Body mass index is 37.59 kg/m .  7/26/2022   194 lbs 7/5/2023      Medication notes:   Medications she takes, that are known to increase weight:abilify, zyrtec, cymbalta, methotrexate, lyrica and flexeril.   Medications she takes, known to decrease weight: none     SAXENDA  7/26/2022  - 7/5/2023     Metformin  -contraindicated due to A1c of 5.3 July 2022  Wellbutrin - Contraindicated due to psych polypharmacy  Naltrexone contraindicated due to chronic pain  Contrave -contraindicated due to psychiatric polypharmacy and chronic pain  Phentermine/ diethylproprion  -contraindicated due to psychiatric polypharmacy  Topamax - could consider if saxenda not covered.   Qsymia contraindicated due to psychiatric poly pharmacy.   Orlistat could consider.   Plenity could consider.      Potential barriers to weight loss identified thus far:   -She has suffered from excess weight since her teenage years.  -Mental health issues have played a role  -Weight positive medications have played a role  -Quitting smoking has played a role in her excess weight  -Eating the wrong types of food  -Lack of exercise - due to back pain  -Chronic back pain -keeps her from being more active  - Genetics, mother and father both overweight  -She is disabled.  -Uses white carbohydrates  -Eats most of her food at the end of the day  -She has had to eat at the food shelf at least a few times this year  -Craves sweets, candy, chocolate, cheeses  -Drinks Mountain Dew but trying to cut down - 9/6/2022 switched from regular mt dew zero.       Strengths that may help weight loss:  -She is a   -Likes vegetables  -Drinks water         Relevant Medications    liraglutide - Weight Management (SAXENDA) 18 MG/3ML pen   Other Visit Diagnoses     Visit for screening mammogram    -  Primary             Dorothy Hong MD  [facility] 53 [facility], presenting for the following health issues:  Weight Loss (F/u)        7/5/2023    11:43 AM   Additional Questions   Roomed  "by xl           Objective    Vitals - Patient Reported  Weight (Patient Reported): 88 kg (194 lb)  Height (Patient Reported): 162.6 cm (5' 4\")        Physical Exam   GENERAL: Healthy, alert and no distress  EYES: Eyes grossly normal to inspection.  No discharge or erythema, or obvious scleral/conjunctival abnormalities.  RESP: No audible wheeze, cough, or visible cyanosis.  No visible retractions or increased work of breathing.    SKIN: Visible skin clear. No significant rash, abnormal pigmentation or lesions.  NEURO: Cranial nerves grossly intact.  Mentation and speech appropriate for age.  PSYCH: Mentation appears normal, affect normal/bright, judgement and insight intact, normal speech and appearance well-groomed.                Video-Visit Details    Type of service:  Video Visit   Video Start Time: 12:31 PM  Video End Time:12:31 PM    Originating Location (pt. Location): Home  Distant Location (provider location):  On-site  Platform used for Video Visit: Rosey    "

## 2023-07-05 NOTE — PROGRESS NOTES
Zayra Ramirez is a 53 year old who is being evaluated via a billable video visit.    How would you like to obtain your AVS? MyChart  If the video visit is dropped, the invitation should be resent by: Send to e-mail at: [email or web address]  Will anyone else be joining your video visit? No        Medical  Weight Loss Follow-Up Diet Evaluation  Assessment:  Zayra is presenting today for a follow up weight management nutrition consultation.  This patient has had an initial appointment and was referred by Dr. Hong for MNT as treatment for Obesity   Weight loss medication: Saxenda.   Pt's weight is 194 lbs   Initial weight: 219 lbs   Weight change: 25 lbs (down)        6/28/2023     8:50 AM   Changes and Difficulties   I have made the following changes to my diet since my last visit: Eat 2x day mostly   With regards to my diet, I am still struggling with: Eating 3 times a day   I have made the following changes to my activity/exercise since my last visit: Walking     BMI: There is no height or weight on file to calculate BMI.  Ideal body weight: 54.7 kg (120 lb 9.5 oz)  Adjusted ideal body weight: 69.3 kg (152 lb 12.1 oz)    Estimated RMR (Neotsu-St Jeor equation):   1479 kcals x 1.3 (light active) = 1923 kcals (for weight maintenance)     Recommended Protein Intake: 60-80 grams of protein/day  Patient Active Problem List:  Patient Active Problem List   Diagnosis     Chronic bilateral low back pain without sciatica     Facial cellulitis     Class 1 obesity with serious comorbidity and body mass index (BMI) of 33.0 to 33.9 in adult     Dental abscess     Hypoxia     Subcutaneous emphysema (H)     Borderline personality disorder (H)     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)     Esophageal stricture     Obstruction of esophagus     HTN (hypertension)     Interstitial lung disease (H)     Moderate persistent asthma without complication     Onychomycosis     Restless leg syndrome     Seasonal allergies      Stress     Respiratory bronchiolitis interstitial lung disease (H)     Spinal epidural abscess     Lumbar spondylosis     Inflammatory arthritis     Arthralgia of temporomandibular joint     Articular disc disorder of temporomandibular joint     Derangement of temporomandibular joint     Calculus of gallbladder without cholecystitis without obstruction     Displacement of articular disc of temporomandibular joint with reduction     Myofascial pain     Oral lesion     Symptomatic cholelithiasis     Sacro-iliac pain     Degenerative lumbar spinal stenosis     Glucose intolerance     Chronic neck pain     Lumbar radiculopathy, chronic     Cervical radiculopathy     Acute pain of right shoulder     Other osteoporosis without current pathological fracture     Diaphragmatic hernia     Bipolar 2 disorder (H)     Choking sensation     Gastro-esophageal reflux disease with esophagitis     Limited opening of mandible     History of pelvic surgery     Voiding dysfunction     Pelvic floor dysfunction     Urinary hesitancy     Urinary frequency     S/P spinal fusion       Progress on goals from last visit: continues to focus on protein at meals, however does find it challenging at times.  Trying to be more mindful in regards to feelings of hunger and fullness.      Dietary Recall:  Breakfast: banana or orange or eggs (1-2)   Lunch:Smoothie, nuts  Dinner:variety-vegetables, protein   Typical snacks: reduced significantly-maybe an orange in the evening   Beverages: water-at least 64 oz/day, Diet Pop 2-3 times/day (trying to limit to no more than 3 times/day)  Exercise: walking the dogs, increased walking at work in addition     Nutrition Diagnosis:    Obesity (NC 3.3) related to overeating and poor lifestyle habits as evidenced by patient's subjective statements and BMI of 33.4 kg/m2.       Intervention:  1. Food and/or nutrient delivery: balanced meals, adequate protein   2. Nutrition education: protein intake   3. Nutrition  counseling: provided support and encouragement     Monitoring/Evaluation:    Goals:  1. Work on adequate protein at each meal, aiming for at least 20-30 grams of protein/meal, 3 meals/day.   2. Try an unflavored protein powder added to smoothie for additional protein.     Patient to follow up in 2 month(s) with RD        Video-Visit Details    Type of service:  Video Visit    Video Start Time (time video started): 2:49 pm     Video End Time (time video stopped): 2:59 pm     Originating Location (pt. Location): Home        Distant Location (provider location):  Off-site    Mode of Communication:  Video Conference via [facility]    Physician has received verbal consent for a Video Visit from the patient? Yes      Saumya Dobbins, DANIEL

## 2023-07-05 NOTE — ASSESSMENT & PLAN NOTE
BMI IMPROVING Body mass index is 37.59 kg/m , now down to 33 with Saxenda 3.0mg     SEVERE OBESITY : Initial bmi is Body mass index is 37.59 kg/m .  With the following weight related comorbid medical conditions: Hypertension, asthma, GERD, gallstones, degenerative spinal stenosis (surgeon suggested weight loss followed by surgery) and chronic back pain.  As well as she is a smoker.  And has mental illness     Contraception - had hysterectomy in 1997.      Patient declined 24 week weight loss program due to cost.      Bariatric surgery was discussed but she is not interested and she has severe mental health issues which may make this very difficult.     Medications: saxenda 3mg    Current goal(s): cutting down on mountain dew (switched from sugar to no sugar and decreased from 6 per day to 3 per day) - noted 7/5/2023   Continue gardening       Follow up 1 month - 3 months     DISCUSSION _________________________________________________________________     Dx: Initial bmi is Body mass index is 37.59 kg/m .  With the following weight related comorbid medical conditions: Hypertension, asthma, GERD, gallstones, degenerative spinal stenosis (surgeon suggested weight loss followed by surgery) and chronic back pain.  As well as she is a smoker.  And has mental illness     BECAUSE OF ABOVE PROBLEMS IT IS STRONGLY ADVISED THAT Zayra LOSE WEIGHT.  BELOW IS MY PLAN FOR her      Start weight 219 lbs, Body mass index is 37.59 kg/m .  7/26/2022   194 lbs 7/5/2023     Medication notes:   Medications she takes, that are known to increase weight:abilify, zyrtec, cymbalta, methotrexate, lyrica and flexeril.   Medications she takes, known to decrease weight: none     SAXENDA  7/26/2022  - 7/5/2023     Metformin  -contraindicated due to A1c of 5.3 July 2022  Wellbutrin - Contraindicated due to psych polypharmacy  Naltrexone contraindicated due to chronic pain  Contrave -contraindicated due to psychiatric polypharmacy and chronic  pain  Phentermine/ diethylproprion  -contraindicated due to psychiatric polypharmacy  Topamax - could consider if saxenda not covered.   Qsymia contraindicated due to psychiatric poly pharmacy.   Orlistat could consider.   Plenity could consider.      Potential barriers to weight loss identified thus far:   -She has suffered from excess weight since her teenage years.  -Mental health issues have played a role  -Weight positive medications have played a role  -Quitting smoking has played a role in her excess weight  -Eating the wrong types of food  -Lack of exercise - due to back pain  -Chronic back pain -keeps her from being more active  - Genetics, mother and father both overweight  -She is disabled.  -Uses white carbohydrates  -Eats most of her food at the end of the day  -She has had to eat at the food shelf at least a few times this year  -Craves sweets, candy, chocolate, cheeses  -Drinks Mountain Dew but trying to cut down - 9/6/2022 switched from regular mt dew zero.       Strengths that may help weight loss:  -She is a   -Likes vegetables  -Drinks water

## 2023-07-14 ENCOUNTER — MYC MEDICAL ADVICE (OUTPATIENT)
Dept: INTERNAL MEDICINE | Facility: CLINIC | Age: 53
End: 2023-07-14
Payer: COMMERCIAL

## 2023-07-14 DIAGNOSIS — Z91.030 ALLERGY TO HONEY BEE VENOM: Primary | ICD-10-CM

## 2023-07-18 RX ORDER — EPINEPHRINE 0.3 MG/.3ML
0.3 INJECTION SUBCUTANEOUS PRN
Qty: 2 EACH | Refills: 1 | Status: SHIPPED | OUTPATIENT
Start: 2023-07-18

## 2023-07-26 DIAGNOSIS — G25.81 RESTLESS LEG SYNDROME: ICD-10-CM

## 2023-07-26 RX ORDER — PREGABALIN 150 MG/1
CAPSULE ORAL
Qty: 60 CAPSULE | Refills: 3 | Status: SHIPPED | OUTPATIENT
Start: 2023-07-26 | End: 2023-10-23

## 2023-08-08 NOTE — TELEPHONE ENCOUNTER
Last post op visit with Dr Wilhelm was 5/25/23:     Surgical Followup       DOS: 11/30/22 Posterior instrumented spinal fusion Thoracic 8 to sacrum, with bilateral pelvic fixation; Transforaminal lumbar interbody fusion with Erickson-Nixon osteotomy lumbar 1 to sacral 1 (5 levels); possible cement augmentation of screws; Use of Infuse bone morphogenetic protein Large kit and allograft.         BMD Hx:  11/24/21 - DEXA spine/hip/wrist.  T-score = -1.1 (Left femoral neck).  Imp: Osteopenia.  12/8/21 - Saw Dr. Guerra (PM&R).  P> RTC 6 wks; had not been seen since.     Congratulated her on her significant improvement from her surgery 6 months ago.  Her thoracolumbar back pain is much better now.  I reassured her regarding my imaging and clinical findings.  While she does have some tilting towards the right, and some shoulder asymmetry (left higher than right), this is pretty mild, and I reassured her it would not affect her functional status.  I also do not think it is at risk of becoming progressive.  When we analyzed her AP x-rays.  The operative levels were actually pretty straight.  And that the tilting towards the right is occurring more in the upper thoracic spine.      Regarding her cervical spine, she has pseudoarthrosis and with broken anterior screws.  I reassured her that the broken screws are completely embedded in bone, and is not at risk of migration.  Her spine likewise is not unstable, and thus she is not at risk of developing significant neurologic deficit or quadriplegia.  As such, the decision on whether to undergo another major spinal surgery to repair the pseudoarthrosis is a personal 1 and based on quality of life issues.  At the end of discussion, we agreed to hold off against surgery at this time, let her enjoy her summer, and we will reevaluate at the 1 year osorio in November.  If she would need something done for her cervical spine, we may consider C2-C5 posterior instrumented fusion.  Because  of the spondylolisthesis at C2-3, we may also need to include that in our construct.     Return to clinic in 6 months (1 year postoperative) with thoracic and lumbar CT for fusion status evaluation       MOVING FORWARD:  Evenity was ordered by Dr. Guerra on 8/31/22 however Zayra never received this Rx due to the insurance denial.  If Dr Wilhelm wishes us to see Zayra again we would need to provide medical justification to show contradiction of using biphosphates if Evenity is still the recommendation after evaluating the fusions done in November 2022, or if there will be additional orthopedic surgeries in the future.    Please refer Zayra back to Dr. Guerra for recommendation as needed.  Dexa may be allowed in November 2023 (repeat in 2 years based on osteopenia) and / or dental concern may be resolved, which allows alternative medication choices.    Donna Rogers RN on 8/8/2023 at 5:13 PM

## 2023-08-10 DIAGNOSIS — G06.1 SPINAL EPIDURAL ABSCESS: ICD-10-CM

## 2023-08-12 NOTE — TELEPHONE ENCOUNTER
cyclobenzaprine (FLEXERIL) 10 MG tablet  90 tablet 1 3/31/2023     3/14/2023  [facility] [place]     Neean Tam APRN [facility]  Internal Medicine    Routed because: not on protocol

## 2023-08-15 RX ORDER — CYCLOBENZAPRINE HCL 10 MG
10 TABLET ORAL AT BEDTIME
Qty: 90 TABLET | Refills: 1 | Status: SHIPPED | OUTPATIENT
Start: 2023-08-15 | End: 2024-01-09

## 2023-08-17 DIAGNOSIS — E66.812 CLASS 2 SEVERE OBESITY WITH SERIOUS COMORBIDITY AND BODY MASS INDEX (BMI) OF 37.0 TO 37.9 IN ADULT, UNSPECIFIED OBESITY TYPE (H): ICD-10-CM

## 2023-08-17 DIAGNOSIS — E66.01 CLASS 2 SEVERE OBESITY WITH SERIOUS COMORBIDITY AND BODY MASS INDEX (BMI) OF 37.0 TO 37.9 IN ADULT, UNSPECIFIED OBESITY TYPE (H): ICD-10-CM

## 2023-08-17 RX ORDER — PEN NEEDLE, DIABETIC 32 GX 1/4"
NEEDLE, DISPOSABLE MISCELLANEOUS
Qty: 100 EACH | Refills: 2 | Status: SHIPPED | OUTPATIENT
Start: 2023-08-17 | End: 2023-12-22

## 2023-08-17 NOTE — TELEPHONE ENCOUNTER
"Last Written Prescription Date:  8/17/22  Last Fill Quantity: 100,  # refills: 2   Last office visit provider:  7/5/23     Requested Prescriptions   Pending Prescriptions Disp Refills    BD PEN NEEDLE MICRO U/F 32G X 6 MM miscellaneous [Pharmacy Med Name: BD UF MICRO PEN NEEDLE [id number]] 100 each 2     Sig: USE DAILY PEN NEEDLES DAILY OR AS DIRECTED.       Diabetic Supplies Protocol Passed - 8/17/2023 10:32 AM        Passed - Medication is active on med list        Passed - Patient is 18 years of age or older        Passed - Recent (6 mo) or future (30 days) visit within the authorizing provider's specialty     Patient had office visit in the last 6 months or has a visit in the next 30 days with authorizing provider.  See \"Patient Info\" tab in inbasket, or \"Choose Columns\" in Meds & Orders section of the refill encounter.                 Marion Nguyen 08/17/23 12:22 PM  "

## 2023-08-19 DIAGNOSIS — G89.29 CHRONIC MIDLINE LOW BACK PAIN WITH RIGHT-SIDED SCIATICA: ICD-10-CM

## 2023-08-19 DIAGNOSIS — G89.29 CHRONIC NECK PAIN: ICD-10-CM

## 2023-08-19 DIAGNOSIS — M54.2 CHRONIC NECK PAIN: ICD-10-CM

## 2023-08-19 DIAGNOSIS — M54.41 CHRONIC MIDLINE LOW BACK PAIN WITH RIGHT-SIDED SCIATICA: ICD-10-CM

## 2023-08-23 ENCOUNTER — MYC MEDICAL ADVICE (OUTPATIENT)
Dept: RHEUMATOLOGY | Facility: CLINIC | Age: 53
End: 2023-08-23
Payer: COMMERCIAL

## 2023-08-23 DIAGNOSIS — G25.81 RESTLESS LEG SYNDROME: ICD-10-CM

## 2023-08-23 RX ORDER — ROPINIROLE 0.5 MG/1
0.5 TABLET, FILM COATED ORAL AT BEDTIME
Qty: 90 TABLET | Refills: 1 | Status: SHIPPED | OUTPATIENT
Start: 2023-09-25 | End: 2024-03-25

## 2023-08-23 RX ORDER — CELECOXIB 200 MG/1
200 CAPSULE ORAL EVERY MORNING
Qty: 90 CAPSULE | Refills: 1 | Status: SHIPPED | OUTPATIENT
Start: 2023-08-23 | End: 2024-01-11

## 2023-08-23 NOTE — TELEPHONE ENCOUNTER
Medication dose: celecoxib (CELEBREX) 200 MG capsule       Last Written Prescription Date:  2/2/23  Last Fill Quantity: 60,   # refills: 2  Last Office Visit: 3/2/23  Future Office visit:     Oct 05, 2023  1:30 PM  (Arrive by 1:15 PM)  Return Visit with Panchito Saenz MD  [facility] ([facility] ) [address] [place] [address] [id number] [phone]       No components found for: CBC1    Creatinine   Date Value Ref Range Status   03/03/2023 0.93 0.51 - 0.95 mg/dL Final   07/10/2021 1.01 0.52 - 1.04 mg/dL Final       AST   Date Value Ref Range Status   03/03/2023 21 10 - 35 U/L Final   07/10/2021 15 0 - 45 U/L Final        Lab Results   Component Value Date    ALT 12 03/03/2023    ALT 21 07/10/2021        BP Readings from Last 1 Encounters:   04/21/23 114/80        Prescription set up and routed to provider as pt has been instructed to take ibuprofen during flares.    EBONIE MirN, RN  [facility] Refill Team

## 2023-08-23 NOTE — TELEPHONE ENCOUNTER
Patient sent [employer] message back that she is not taking any NSAID's.    Will update Dr. Saenz.    EBONIE ChenN, RN  RN Care Coordinator Rheumatology

## 2023-08-23 NOTE — TELEPHONE ENCOUNTER
Message left for patient to call this RN back or to reply to [employer] message regarding updated information Dr. Saenz needs before refilling the celebrex.    Yandy Orlando BSN, RN  RN Care Coordinator Rheumatology

## 2023-08-23 NOTE — TELEPHONE ENCOUNTER
The last prescription, which should be for 6 months supply, was written on 4/2. This refill request is a little over a month early. I called her to see if she is taking more than one per night and she said no. She estimates she has about a months worth left in the bottle. She got that last fill on 6/24. This refill request was generated by the pharmacy. I will sign the refill but push the start date out a month.  She has an appointment in October.  Bennett Goltz, PA-C

## 2023-08-26 ENCOUNTER — HEALTH MAINTENANCE LETTER (OUTPATIENT)
Age: 53
End: 2023-08-26

## 2023-09-03 NOTE — NURSING NOTE
"Chief Complaint   Patient presents with     RECHECK     thrush follow up       Height 1.626 m (5' 4\"), weight 100.7 kg (222 lb), not currently breastfeeding.    Mabel Obrien, EMT    "
No

## 2023-09-06 ENCOUNTER — VIRTUAL VISIT (OUTPATIENT)
Dept: SURGERY | Facility: CLINIC | Age: 53
End: 2023-09-06
Payer: COMMERCIAL

## 2023-09-06 DIAGNOSIS — E74.39 GLUCOSE INTOLERANCE: ICD-10-CM

## 2023-09-06 DIAGNOSIS — E66.811 CLASS 1 OBESITY DUE TO EXCESS CALORIES WITHOUT SERIOUS COMORBIDITY WITH BODY MASS INDEX (BMI) OF 32.0 TO 32.9 IN ADULT: Primary | ICD-10-CM

## 2023-09-06 DIAGNOSIS — Z71.3 NUTRITIONAL COUNSELING: ICD-10-CM

## 2023-09-06 DIAGNOSIS — E66.09 CLASS 1 OBESITY DUE TO EXCESS CALORIES WITHOUT SERIOUS COMORBIDITY WITH BODY MASS INDEX (BMI) OF 32.0 TO 32.9 IN ADULT: Primary | ICD-10-CM

## 2023-09-06 PROCEDURE — 97803 MED NUTRITION INDIV SUBSEQ: CPT | Mod: VID | Performed by: DIETITIAN, REGISTERED

## 2023-09-06 NOTE — PROGRESS NOTES
Zayra Ramirez is a 53 year old who is being evaluated via a billable video visit.      How would you like to obtain your AVS? MyChart  If the video visit is dropped, the invitation should be resent by: Send to e-mail at: [email or web address]  Will anyone else be joining your video visit? No        Medical  Weight Loss Follow-Up Diet Evaluation  Assessment:  Zayra is presenting today for a follow up weight management nutrition consultation.  This patient has had an initial appointment and was referred by Dr. Hong  for MNT as treatment for Obesity.     Weight loss medication:  Saxenda .   Pt's weight is 191 lbs   Initial weight: 219 lbs   Weight change: 28 lbs (down)         6/28/2023     8:50 AM   Changes and Difficulties   I have made the following changes to my diet since my last visit: Eat 2x day mostly   With regards to my diet, I am still struggling with: Eating 3 times a day   I have made the following changes to my activity/exercise since my last visit: Walking     BMI: There is no height or weight on file to calculate BMI.  Ideal body weight: 54.7 kg (120 lb 9.5 oz)  Adjusted ideal body weight: 69.3 kg (152 lb 12.1 oz)    Estimated RMR ([place]-St Jeor equation):   1466 kcals x 1.3 (light active) = 1906 kcals (for weight maintenance)     Recommended Protein Intake: 60-80 grams of protein/day  Patient Active Problem List:  Patient Active Problem List   Diagnosis    Chronic bilateral low back pain without sciatica    Facial cellulitis    Class 1 obesity with serious comorbidity and body mass index (BMI) of 33.0 to 33.9 in adult    Dental abscess    Hypoxia    Subcutaneous emphysema (H)    Borderline personality disorder (H)    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)    Esophageal stricture    Obstruction of esophagus    HTN (hypertension)    Interstitial lung disease (H)    Moderate persistent asthma without complication    Onychomycosis    Restless leg syndrome    Seasonal allergies     Stress    Respiratory bronchiolitis interstitial lung disease (H)    Spinal epidural abscess    Lumbar spondylosis    Inflammatory arthritis    Arthralgia of temporomandibular joint    Articular disc disorder of temporomandibular joint    Derangement of temporomandibular joint    Calculus of gallbladder without cholecystitis without obstruction    Displacement of articular disc of temporomandibular joint with reduction    Myofascial pain    Oral lesion    Symptomatic cholelithiasis    Sacro-iliac pain    Degenerative lumbar spinal stenosis    Glucose intolerance    Chronic neck pain    Lumbar radiculopathy, chronic    Cervical radiculopathy    Acute pain of right shoulder    Other osteoporosis without current pathological fracture    Diaphragmatic hernia    Bipolar 2 disorder (H)    Choking sensation    Gastro-esophageal reflux disease with esophagitis    Limited opening of mandible    History of pelvic surgery    Voiding dysfunction    Pelvic floor dysfunction    Urinary hesitancy    Urinary frequency    S/P spinal fusion       Progress on goals from last visit: Continues to focus on protein first at meals, however has not been as consistent with eating 3 meals/day.     Dietary Recall:  Breakfast: skipped or eggs   Lunch:nuts typically   Dinner:either beef or chicken, occasional pork, vegetable, potato or rice (smaller portion)  Typical snacks: fruit in the evening  Beverages: water (64 oz/day), Diet Pop (3 times/day), Crystal Light   Exercise: walking the dog-striving for longer distances this month    Nutrition Diagnosis:    Obesity (NC 3.3) related to overeating and poor lifestyle habits as evidenced by patient's subjective statements and BMI of 32.8 kg/m2.     Intervention:  Food and/or nutrient delivery: balanced meals, adequate protein   Nutrition education: protein options for meals, protein supplement  Nutrition counseling: provided support and encouragement       Monitoring/Evaluation:    Goals:  Be  consistent with eating 3 meals/day.   Work on focusing on protein first at each meal, aiming towards 20-30 grams of protein/meal.     Patient to follow up in 2 week(s) with bariatrician and 2 month(s) with RD      Video-Visit Details    Type of service:  Video Visit    Video Start Time (time video started): 8:29 am      Video End Time (time video stopped): 8:39 am     Originating Location (pt. Location): Home      Distant Location (provider location):  Off-site    Mode of Communication:  Video Conference via [facility]    Physician has received verbal consent for a Video Visit from the patient? Yes      Saumya Dobbins, DANIEL

## 2023-09-06 NOTE — LETTER
9/6/2023         RE: Zayra Ramirez  [address] [place] [address]        Dear Colleague,    Thank you for referring your patient, Zayra Ramirez, to the [facility] [place]. Please see a copy of my visit note below.    Zayra Ramirez is a 53 year old who is being evaluated via a billable video visit.      How would you like to obtain your AVS? MyChart  If the video visit is dropped, the invitation should be resent by: Send to e-mail at: [email or web address]  Will anyone else be joining your video visit? No        Medical  Weight Loss Follow-Up Diet Evaluation  Assessment:  Zayra is presenting today for a follow up weight management nutrition consultation.  This patient has had an initial appointment and was referred by Dr. Hong  for MNT as treatment for Obesity.     Weight loss medication:  Saxenda .   Pt's weight is 191 lbs   Initial weight: 219 lbs   Weight change: 28 lbs (down)         6/28/2023     8:50 AM   Changes and Difficulties   I have made the following changes to my diet since my last visit: Eat 2x day mostly   With regards to my diet, I am still struggling with: Eating 3 times a day   I have made the following changes to my activity/exercise since my last visit: Walking     BMI: There is no height or weight on file to calculate BMI.  Ideal body weight: 54.7 kg (120 lb 9.5 oz)  Adjusted ideal body weight: 69.3 kg (152 lb 12.1 oz)    Estimated RMR ([place]-St Jeor equation):   1466 kcals x 1.3 (light active) = 1906 kcals (for weight maintenance)     Recommended Protein Intake: 60-80 grams of protein/day  Patient Active Problem List:  Patient Active Problem List   Diagnosis     Chronic bilateral low back pain without sciatica     Facial cellulitis     Class 1 obesity with serious comorbidity and body mass index (BMI) of 33.0 to 33.9 in adult     Dental abscess     Hypoxia     Subcutaneous emphysema (H)     Borderline personality  disorder (H)     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)     Esophageal stricture     Obstruction of esophagus     HTN (hypertension)     Interstitial lung disease (H)     Moderate persistent asthma without complication     Onychomycosis     Restless leg syndrome     Seasonal allergies     Stress     Respiratory bronchiolitis interstitial lung disease (H)     Spinal epidural abscess     Lumbar spondylosis     Inflammatory arthritis     Arthralgia of temporomandibular joint     Articular disc disorder of temporomandibular joint     Derangement of temporomandibular joint     Calculus of gallbladder without cholecystitis without obstruction     Displacement of articular disc of temporomandibular joint with reduction     Myofascial pain     Oral lesion     Symptomatic cholelithiasis     Sacro-iliac pain     Degenerative lumbar spinal stenosis     Glucose intolerance     Chronic neck pain     Lumbar radiculopathy, chronic     Cervical radiculopathy     Acute pain of right shoulder     Other osteoporosis without current pathological fracture     Diaphragmatic hernia     Bipolar 2 disorder (H)     Choking sensation     Gastro-esophageal reflux disease with esophagitis     Limited opening of mandible     History of pelvic surgery     Voiding dysfunction     Pelvic floor dysfunction     Urinary hesitancy     Urinary frequency     S/P spinal fusion       Progress on goals from last visit: Continues to focus on protein first at meals, however has not been as consistent with eating 3 meals/day.     Dietary Recall:  Breakfast: skipped or eggs   Lunch:nuts typically   Dinner:either beef or chicken, occasional pork, vegetable, potato or rice (smaller portion)  Typical snacks: fruit in the evening  Beverages: water (64 oz/day), Diet Pop (3 times/day), Crystal Light   Exercise: walking the dog-striving for longer distances this month    Nutrition Diagnosis:    Obesity (NC 3.3) related to overeating and poor lifestyle habits  as evidenced by patient's subjective statements and BMI of 32.8 kg/m2.     Intervention:  Food and/or nutrient delivery: balanced meals, adequate protein   Nutrition education: protein options for meals, protein supplement  Nutrition counseling: provided support and encouragement       Monitoring/Evaluation:    Goals:  Be consistent with eating 3 meals/day.   Work on focusing on protein first at each meal, aiming towards 20-30 grams of protein/meal.     Patient to follow up in 2 week(s) with bariatrician and 2 month(s) with RD      Video-Visit Details    Type of service:  Video Visit    Video Start Time (time video started): 8:29 am      Video End Time (time video stopped): 8:39 am     Originating Location (pt. Location): Home      Distant Location (provider location):  Off-site    Mode of Communication:  Video Conference via [facility]    Physician has received verbal consent for a Video Visit from the patient? Yes      Saumya Dobbins RD          Again, thank you for allowing me to participate in the care of your patient.        Sincerely,        Saumya Dobbins RD

## 2023-09-07 ENCOUNTER — MYC MEDICAL ADVICE (OUTPATIENT)
Dept: RHEUMATOLOGY | Facility: CLINIC | Age: 53
End: 2023-09-07
Payer: COMMERCIAL

## 2023-09-20 ENCOUNTER — OFFICE VISIT (OUTPATIENT)
Dept: FAMILY MEDICINE | Facility: CLINIC | Age: 53
End: 2023-09-20
Attending: FAMILY MEDICINE
Payer: COMMERCIAL

## 2023-09-20 VITALS
DIASTOLIC BLOOD PRESSURE: 68 MMHG | HEIGHT: 64 IN | OXYGEN SATURATION: 98 % | BODY MASS INDEX: 32.69 KG/M2 | HEART RATE: 65 BPM | WEIGHT: 191.5 LBS | SYSTOLIC BLOOD PRESSURE: 102 MMHG | RESPIRATION RATE: 16 BRPM

## 2023-09-20 DIAGNOSIS — Z11.4 SCREENING FOR HIV (HUMAN IMMUNODEFICIENCY VIRUS): ICD-10-CM

## 2023-09-20 DIAGNOSIS — Z11.59 NEED FOR HEPATITIS C SCREENING TEST: ICD-10-CM

## 2023-09-20 DIAGNOSIS — Z13.9 ENCOUNTER FOR SCREENING INVOLVING SOCIAL DETERMINANTS OF HEALTH (SDOH): Primary | ICD-10-CM

## 2023-09-20 DIAGNOSIS — F12.10 CANNABIS ABUSE, DAILY USE: ICD-10-CM

## 2023-09-20 DIAGNOSIS — E66.811 CLASS 1 OBESITY WITH SERIOUS COMORBIDITY AND BODY MASS INDEX (BMI) OF 32.0 TO 32.9 IN ADULT, UNSPECIFIED OBESITY TYPE: ICD-10-CM

## 2023-09-20 DIAGNOSIS — Z12.31 VISIT FOR SCREENING MAMMOGRAM: ICD-10-CM

## 2023-09-20 PROCEDURE — G0008 ADMIN INFLUENZA VIRUS VAC: HCPCS | Performed by: FAMILY MEDICINE

## 2023-09-20 PROCEDURE — 99213 OFFICE O/P EST LOW 20 MIN: CPT | Mod: 25 | Performed by: FAMILY MEDICINE

## 2023-09-20 PROCEDURE — 90682 RIV4 VACC RECOMBINANT DNA IM: CPT | Performed by: FAMILY MEDICINE

## 2023-09-20 RX ORDER — PHENAZOPYRIDINE HYDROCHLORIDE 100 MG/1
TABLET, FILM COATED ORAL
COMMUNITY
Start: 2023-07-31 | End: 2023-11-13

## 2023-09-20 ASSESSMENT — ASTHMA QUESTIONNAIRES: ACT_TOTALSCORE: 10

## 2023-09-20 NOTE — ASSESSMENT & PLAN NOTE
BMI IMPROVING Body mass index is 37.59 kg/m , now down to 32 with Saxenda 3.0mg.  Wants to switch to wegovy 1.0 mg and will message me when out of saxenda and ready to change.     SEVERE OBESITY : Initial bmi is Body mass index is 37.59 kg/m .  With the following weight related comorbid medical conditions: Hypertension, asthma, GERD, gallstones, degenerative spinal stenosis (surgeon suggested weight loss followed by surgery) and chronic back pain.  As well as she is a smoker.  And has mental illness     Contraception - had hysterectomy in 1997.      Patient declined 24 week weight loss program due to cost.      Bariatric surgery was discussed but she is not interested and she has severe mental health issues which may make this very difficult.     Medications: saxenda 3mg. Wants to switch to wegovy 1.0 mg and will message me when out of saxenda and ready to change.     Current goal(s): cutting down on mountain dew (switched from sugar to no sugar and decreased from 6 per day to 3 per day) - noted 7/5/2023   Continue gardening       Follow up 1 month - 3 months     DISCUSSION _________________________________________________________________     Dx: Initial bmi is Body mass index is 37.59 kg/m .  With the following weight related comorbid medical conditions: Hypertension, asthma, GERD, gallstones, degenerative spinal stenosis (surgeon suggested weight loss followed by surgery) and chronic back pain.  As well as she is a smoker.  And has mental illness     BECAUSE OF ABOVE PROBLEMS IT IS STRONGLY ADVISED THAT Zayra LOSE WEIGHT.  BELOW IS MY PLAN FOR her      Start weight 219 lbs, Body mass index is 37.59 kg/m .  7/26/2022   194 lbs 7/5/2023     Medication notes:   Medications she takes, that are known to increase weight:abilify, zyrtec, cymbalta, methotrexate, lyrica and flexeril.   Medications she takes, known to decrease weight: none     SAXENDA  7/26/2022  - 9/20/2023 .Wants to switch to wegovy 1.0 mg and will  message me when out of saxenda and ready to change.      Metformin  -contraindicated due to A1c of 5.3 July 2022  Wellbutrin - Contraindicated due to psych polypharmacy  Naltrexone contraindicated due to chronic pain  Contrave -contraindicated due to psychiatric polypharmacy and chronic pain  Phentermine/ diethylproprion  -contraindicated due to psychiatric polypharmacy  Topamax - could consider if saxenda not covered.   Qsymia contraindicated due to psychiatric poly pharmacy.   Orlistat could consider.   Plenity could consider.      Potential barriers to weight loss identified thus far:   -She has suffered from excess weight since her teenage years.  -Mental health issues have played a role  -Weight positive medications have played a role  -Quitting smoking has played a role in her excess weight  -Eating the wrong types of food  -Lack of exercise - due to back pain  -Chronic back pain -keeps her from being more active  - Genetics, mother and father both overweight  -She is disabled.  -Uses white carbohydrates  -Eats most of her food at the end of the day  -She has had to eat at the food shelf at least a few times this year  -Craves sweets, candy, chocolate, cheeses  -Drinks Mountain Dew but trying to cut down - 9/6/2022 switched from regular mt dew zero.       Strengths that may help weight loss:  -She is a   -Likes vegetables  -Drinks water

## 2023-09-20 NOTE — ASSESSMENT & PLAN NOTE
Patient smells strongly of cannabis today, so when I asked her she did endorse smoking cannabis. We discussed that cannabis is weight positive. She will consider cessation.

## 2023-09-20 NOTE — PROGRESS NOTES
Assessment & Plan   Problem List Items Addressed This Visit          Digestive    Class 1 obesity with serious comorbidity and body mass index (BMI) of 32.0 to 32.9 in adult     BMI IMPROVING Body mass index is 37.59 kg/m , now down to 32 with Saxenda 3.0mg.  Wants to switch to wegovy 1.0 mg and will message me when out of saxenda and ready to change.     SEVERE OBESITY : Initial bmi is Body mass index is 37.59 kg/m .  With the following weight related comorbid medical conditions: Hypertension, asthma, GERD, gallstones, degenerative spinal stenosis (surgeon suggested weight loss followed by surgery) and chronic back pain.  As well as she is a smoker.  And has mental illness     Contraception - had hysterectomy in 1997.      Patient declined 24 week weight loss program due to cost.      Bariatric surgery was discussed but she is not interested and she has severe mental health issues which may make this very difficult.     Medications: saxenda 3mg. Wants to switch to wegovy 1.0 mg and will message me when out of saxenda and ready to change.     Current goal(s): cutting down on mountain dew (switched from sugar to no sugar and decreased from 6 per day to 3 per day) - noted 7/5/2023   Continue gardening       Follow up 1 month - 3 months     DISCUSSION _________________________________________________________________     Dx: Initial bmi is Body mass index is 37.59 kg/m .  With the following weight related comorbid medical conditions: Hypertension, asthma, GERD, gallstones, degenerative spinal stenosis (surgeon suggested weight loss followed by surgery) and chronic back pain.  As well as she is a smoker.  And has mental illness     BECAUSE OF ABOVE PROBLEMS IT IS STRONGLY ADVISED THAT Zayra LOSE WEIGHT.  BELOW IS MY PLAN FOR her      Start weight 219 lbs, Body mass index is 37.59 kg/m .  7/26/2022   194 lbs 7/5/2023     Medication notes:   Medications she takes, that are known to increase weight:abilify, zyrtec,  cymbalta, methotrexate, lyrica and flexeril.   Medications she takes, known to decrease weight: none     SAXENDA  7/26/2022  - 9/20/2023 .Wants to switch to wegovy 1.0 mg and will message me when out of saxenda and ready to change.      Metformin  -contraindicated due to A1c of 5.3 July 2022  Wellbutrin - Contraindicated due to psych polypharmacy  Naltrexone contraindicated due to chronic pain  Contrave -contraindicated due to psychiatric polypharmacy and chronic pain  Phentermine/ diethylproprion  -contraindicated due to psychiatric polypharmacy  Topamax - could consider if saxenda not covered.   Qsymia contraindicated due to psychiatric poly pharmacy.   Orlistat could consider.   Plenity could consider.      Potential barriers to weight loss identified thus far:   -She has suffered from excess weight since her teenage years.  -Mental health issues have played a role  -Weight positive medications have played a role  -Quitting smoking has played a role in her excess weight  -Eating the wrong types of food  -Lack of exercise - due to back pain  -Chronic back pain -keeps her from being more active  - Genetics, mother and father both overweight  -She is disabled.  -Uses white carbohydrates  -Eats most of her food at the end of the day  -She has had to eat at the food shelf at least a few times this year  -Craves sweets, candy, chocolate, cheeses  -Drinks Mountain Dew but trying to cut down - 9/6/2022 switched from regular mt dew zero.       Strengths that may help weight loss:  -She is a   -Likes vegetables  -Drinks water            Other    Cannabis abuse, daily use     Patient smells strongly of cannabis today, so when I asked her she did endorse smoking cannabis. We discussed that cannabis is weight positive. She will consider cessation.          Other Visit Diagnoses       Encounter for screening involving social determinants of health (SDoH)    -  Primary    Screening for HIV (human immunodeficiency virus)  "       Need for hepatitis C screening test        Visit for screening mammogram                          BMI:   Estimated body mass index is 32.87 kg/m  as calculated from the following:    Height as of this encounter: 1.626 m (5' 4\").    Weight as of this encounter: 86.9 kg (191 lb 8 oz).           Dorothy Hong MD  [facility] LIZZETH Iverson is a 53 year old, presenting for the following health issues:  Follow Up (Wt loss)      9/20/2023    10:07 AM   Additional Questions   Roomed by Timi Srivastava MA   Accompanied by Self         9/20/2023    10:07 AM   Patient Reported Additional Medications   Patient reports taking the following new medications None       History of Present Illness       Reason for visit:  Weight management    She eats 2-3 servings of fruits and vegetables daily.She consumes 0 sweetened beverage(s) daily.She exercises with enough effort to increase her heart rate 20 to 29 minutes per day.  She exercises with enough effort to increase her heart rate 5 days per week. She is missing 1 dose(s) of medications per week.  She is not taking prescribed medications regularly due to remembering to take.               Objective    /68 (BP Location: Left arm, Patient Position: Sitting, Cuff Size: Adult Large)   Pulse 65   Resp 16   Ht 1.626 m (5' 4\")   Wt 86.9 kg (191 lb 8 oz)   LMP  (LMP Unknown)   SpO2 98%   BMI 32.87 kg/m    Body mass index is 32.87 kg/m .  Physical Exam  Constitutional:       Appearance: Normal appearance.   HENT:      Head: Normocephalic and atraumatic.   Cardiovascular:      Rate and Rhythm: Normal rate and regular rhythm.   Pulmonary:      Effort: Pulmonary effort is normal.   Musculoskeletal:         General: Normal range of motion.      Cervical back: Normal range of motion and neck supple.   Neurological:      General: No focal deficit present.      Mental Status: She is alert and oriented to person, place, and time.                "

## 2023-09-22 DIAGNOSIS — Z87.39 PERSONAL HISTORY OF OTHER DISEASES OF THE MUSCULOSKELETAL SYSTEM AND CONNECTIVE TISSUE: ICD-10-CM

## 2023-09-25 RX ORDER — FOLIC ACID 1 MG/1
1 TABLET ORAL DAILY
Qty: 90 TABLET | Refills: 1 | Status: SHIPPED | OUTPATIENT
Start: 2023-09-25 | End: 2024-02-20

## 2023-09-25 NOTE — TELEPHONE ENCOUNTER
folic acid (FOLVITE) 1 MG tablet   Last Written Prescription Date:  7/29/2022  Dr Saenz  Last Fill Quantity: 90,   # refills: 3  Last Office Visit : 3/2/2023  Oralia       Refills sent

## 2023-10-02 NOTE — PROGRESS NOTES
"   02/15/22 1100   Appointment Info   Signing clinician's name / credentials Zayra Huerta, PT   Visits Used 10/10   GOALS   PT Goals 6   PT Goal 1   Goal Identifier 1 - HEP   Goal Description Zayra to be independent in an appropriate HEP to address her impairments and improve her function and to assist in a successful spine surgery if this occurs.    Goal Progress goal met for current program   Target Date 03/01/22   Date Met 02/15/22   PT Goal 2   Goal Identifier 2- endurance/6MWT   Goal Description Zayra to increase her 6MWT distance by 75 m or greater from initial testing to demonstrate statistically significant improvement in activity tolerance per this assessment to allow her to return to tolerating more community outings.    Goal Progress 2/10/2022   640 feet without device, last 3 min with cues tc and vc on gait pattern to allow relaxation of upper body and back and decrease progressino to greater back/sciatic pain.  At 2 min 18 sec started to have increased L lowback/gluteal pain.  1/6/2022  3 min 29 sec, 400 feet, no device Meters 121.92 meters   at 120 feet started to have some difficulties with breathing and R lowback started to hurt, at 3 min less steadiness wiht balance and at end low back B hurt.   No device .improved distance/ completed the 6MWT but with cues as noted, target date extended.   Target Date 04/19/22   PT Goal 3   Goal Identifier 3- balance   Goal Description Zayra to improve her balance with demonstrating abiltiy to complete [place] on foam cushion with EC x 30 sec.  (Not able to do at eval)   Target Date 03/01/22   Date Met 02/15/22   Goal Progress goal met, able to achieve 30\" with normal and effective ankle reactions   PT Goal 4   Goal Identifier 4- DGI   Goal Description Zayra to score 20 or greater on DGI to demonstrate improved safety with gait for decreased fall risk and greater safety in community.    Target Date 03/01/22   Goal Progress Goal met with score of 20/24 "   Date Met 02/15/22   PT Goal 5   Goal Identifier sit <> stand   Goal Description Zayra to complete 7 sit <> stand from 18 inch chair without UE support /arms across chest to demonstrate improved functional LE strength.    Target Date 04/19/22   Goal Progress NT, will continue goal with new target date   PT Goal 6   Goal Identifier Stairs   Goal Description Zayra will have improve functional strength and stamina to be able to climb stairs recip with 1 UE support and minimal fatigue/SOB to easily access the basement in her home.    Target Date 04/19/22         DISCHARGE  Reason for Discharge: Patient has failed to schedule further appointments.  Zayra was on hold for PT in regards to her balance until she returned from [place]    She did not schedule further sessions.  Will discharge at this time.  Please see notes /daily notes in EPIC for further details. Thank you.     Equipment Issued: HEP    Discharge Plan: Patient to continue home program.    Referring Provider:  Yeimy Guerra

## 2023-10-11 NOTE — PROGRESS NOTES
[facility]  Rheumatology Clinic  Panchito Saenz MD  10/12/2023     Name: Zayra Ramirez  MRN: [id number]  Age: 53 year old  : 1970  Referring provider: Aime Mason    Assessment and Plan:  # Seronegative inflammatory arthritis:  Daily joint pain and widespread morning stiffness have recurred modestly.  Exam shows no inflammatory changes in knuckles fingers toes or mid feet; there is impingement of the left shoulder.    Blood work in 2022 showed AST and ALT elevated at 110, and 57 respectively.  CBC showed hemoglobin of 7.4 with a platelet count of 231,000.  CRP in 2023 was elevated at 12.     Discussion: Persistent and recurrent morning stiffness and increasing widespread multifocal joint pain is a strong indicator of recurring inflammatory arthritis.  Patient has not used disease modifying therapy since discontinuing methotrexate in early .  Immunomodulatory therapy is warranted.  We discussed my impression again I think that that hydroxychloroquine may be a safer choice than methotrexate with its lack of immunosuppressive capacity, and no toxicity to the liver or to postsurgical healing.  I recommend remaining off methotrexate for now and continuing hydroxychloroquine.  Combination therapy with anti-TNF could be contemplated for recurrence of inflammatory polyarthritis.    # Degenerative arthritis, neck and lumbar spine: Chronic low back pain is markedly improved after lumbar fusion surgery in 2022 by Dr. Krishnan.  Discussions are ongoing regarding demonstrated degenerative disc disease in the cervical spine and possible surgical solutions for intermittent radicular pain.    #Rotator cuff tendinitis, left greater than right, potentially related to calcium pyrophosphate deposition disease; Crystal examination on right shoulder synovial fluid revealed CPPD crystals in 2022.: I recommended resumption of home exercise program for maintaining and improving shoulder range  of motion.  Stated that we could perform subacromial injection if physical therapeutic means do not result in improvement in 4 to 5 weeks.    # Tobacco abuse:   Patient quit smoking in early 2022    # LFT increase: Cause of mild transaminase elevation in early December 2022 is not clear.  Methotrexate had been off for approximately 4 weeks at that blood draw.  I recommend rechecking LFTs today.    Follow-up:     Plan:  1. Start hydroxychloroquine 200 mg twice a day.  Expect maximal benefit in 4 months after starting. While using hydroxychloroquine, undergo annual examination of the retina for rare retinal toxicity.  First ophthalmology visit may occur in late 2024.  2.  Resume daily Physical therapy for left more so than right shoulder range of motion  3.  Recheck liver function, kidney function, and inflammation today.  4.  Use acetaminophen up to 1000 mg 3 times a day, and celecoxib 200 mg once daily for pain.  5.  Start short-term course of prednisone to get inflammatory symptoms under control: 15 mg daily for 1 week, then 10 mg daily for 1 week.    Panchito Saenz MD  Staff Rheumatologist, [facility]:   Zayra Ramirez follows up for seronegative inflammatory arthritis and L shoulder pain.  She was last seen 2-2023 when seronegative inflammatory arthritis was stable. Recommendation was to continue methotrexate monotherapy at 25 mg once weekly, and to use Celebrex for residual joint pain.    Interval history October 12, 2023    Patient relates gradual return of achy pain in left greater than right shoulder, both knees, both ankles, as well as ankles and wrists to a lesser extent..  There is no visible swelling in hands or feet, but in the mornings, joints are stiff for at least 1 hour.  Pain is relieved modestly by celecoxib, acetaminophen does not provide much relief.    She has ongoing right shoulder and shooting pain down into the hand, she attributes to nerve pinching in the right side of the neck.   "She will address this with Dr. Ballard.    She stopped methotrexate approximately 2 years ago prior to back surgery (which was very successful; she is doing very well with regard to her lower back.) and has not restarted.  She did not start hydroxychloroquine after the last visit, for unclear reasons, she did not receive communication about the Plaquenil recommendation.  Interval history February 28, 2023    Patient was seen by Dr. Wilhelm in orthopedics on February 16, 2023 in follow-up of spinal fusion surgery performed in November 2022.  Impression was of satisfactory postoperative recovery, off opioids.  Doing well with regards to her lower back.  Continued right sided radicular arm pain was noted.  Concern was for left-sided cervical neuritic symptoms, potentially related to compressive neuropathy.  Recommendation was to continue with nonoperative management and have consultation with pain provider.    She stopped methotrexate before back surgery and started hydroxychloroquine 200 mg tiwce daily. She has continued with hydroxychloroquine.   Today she is doing good with respect to small joint pain.  Her shoulders feel \"tight\" but less painful than last year, comfortable; she does use aceta/ibu each day.  She stil has 1-2 hours of early morning stiffness in small joints, but she is not disabled by the stiffness. Helped by Ptx.    Interval history July 29, 2022    Patient underwent left subacromial injection in rheumatology clinic on Yessenia 3, 2022. She noted signifidcant improvement, with ability to use the L shoulder.     Patient was hospitalized at [facility] from July 13 through July 15, 2022 for pseudogout flareup, and rheumatoid arthritis.  History was of sudden worsening of right shoulder pain on July 12, associated with elevated inflammatory markers and leukocytosis.  Shoulder aspiration was performed in the emergency room and fluid analysis revealed CPPD crystals; she underwent shoulder injection with " "dramatic improvement, and she was discharged on celebrex.    Since discharge, the R shoulder has been better, able to tolerate gardening and other ADL.   Using acetaminophen 3 500 mg twice daily.    She is doing Ptx both for her shoulders and for her back. She is doing exercises at home daily for the R shoulder.     Interval history June 2, 2022    She still has waxing/waning pain in both shoulders, knees, hips. Sieges of pain start in the mornings, \"I can't even get out of bed due to severe shoulder/elbow pain\". Oftentimes it takes an hour to get out of bed, and a total of 2 hours of early morning stiffness is common.  Former pain affecting fingers wrists and toes has lessened somewhat.    Chronic back/neck pain is still a problem. She has seen Dr. Wilhelm, who has discussed low back surgery pending a weight change.     She is still on methotrexate 10 tabs weekly (since 2-2022). She does not think it helps.  She has used several course of prendionse and 2 courses of medrol, last dose 2 weeks ago.   She is \"at least 50%\" better during steroid tapers; symptoms come back within 10 days of stopping. Not using ibuprofen.     Review of Systems:   Pertinent items are noted in HPI or as below, remainder of complete ROS is negative.      No recent problems with hearing or vision. No swallowing problems.   No breathing difficulty, shortness of breath, coughing, or wheezing.  No chest pain or palpitations.  No heart burn, indigestion, abdominal pain, nausea, vomiting, diarrhea.  No urination problems, no bloody, cloudy urine, no dysuria.  No numbing, tingling, weakness.  No headaches or confusion.  No rashes. No easy bleeding or bruising.     Active Medications:   Current Outpatient Medications   Medication Sig    albuterol (PROAIR HFA/PROVENTIL HFA/VENTOLIN HFA) 108 (90 Base) MCG/ACT inhaler Inhale 2 puffs into the lungs every 6 hours as needed for shortness of breath / dyspnea or wheezing Ventolin please    albuterol " (PROVENTIL) (2.5 MG/3ML) 0.083% neb solution INHALE 3 ML VIA A NEBULIZER 4 TIMES DAILY IF NEEDED.    ARIPiprazole (ABILIFY) 5 MG tablet Take 5 mg by mouth daily    BD PEN NEEDLE MICRO U/F 32G X 6 MM miscellaneous USE DAILY PEN NEEDLES DAILY OR AS DIRECTED.    Black Pepper-Turmeric (TURMERIC CURCUMIN) 5-1000 MG CAPS Take 1 capsule by mouth daily    busPIRone HCl (BUSPAR) 30 MG tablet Take 30 mg by mouth 2 times daily     carboxymethylcellulose (CARBOXYMETHYLCELLULOSE SODIUM) 0.5 % SOLN ophthalmic solution Place 1 drop into both eyes 3 times daily as needed for dry eyes    celecoxib (CELEBREX) 200 MG capsule Take 1 capsule (200 mg) by mouth every morning    cloNIDine (CATAPRES) 0.1 MG tablet Take 1 tablet by mouth as needed    cyclobenzaprine (FLEXERIL) 10 MG tablet TAKE 1 TABLET BY MOUTH EVERYDAY AT BEDTIME    DULoxetine (CYMBALTA) 60 MG capsule Take 120 mg by mouth At Bedtime     EPINEPHrine (ANY BX GENERIC EQUIV) 0.3 MG/0.3ML injection 2-pack Inject 0.3 mLs (0.3 mg) into the muscle as needed for anaphylaxis May repeat one time in 5-15 minutes if response to initial dose is inadequate.    EPINEPHrine (EPIPEN/ADRENACLICK/OR ANY BX GENERIC EQUIV) 0.3 MG/0.3ML injection 2-pack Inject 0.3 mg into the muscle as needed    ferrous fumarate 65 mg, [place]. FE,-Vitamin C 125 mg (VITRON C)  MG TABS tablet Take 1 tablet by mouth daily    folic acid (FOLVITE) 1 MG tablet Take 1 tablet (1 mg) by mouth daily    hydroxychloroquine (PLAQUENIL) 200 MG tablet Take 1 tablet (200 mg) by mouth 2 times daily    insulin pen needle (32G X 4 MM) 32G X 4 MM miscellaneous Use 1 NEEDLE WEEKLY    lisinopril-hydrochlorothiazide (ZESTORETIC) 20-25 MG tablet Take 0.5 tablets by mouth daily    methotrexate sodium 2.5 MG TABS     montelukast (SINGULAIR) 10 MG tablet Take 1 tablet by mouth At Bedtime    omeprazole (PRILOSEC) 40 MG DR capsule Take 1 capsule (40 mg) by mouth daily    OXYGEN-HELIUM IN 3L @ night    Oxygen only not helium     phenazopyridine (PYRIDIUM) 100 MG tablet TAKE 1 TABLET BY MOUTH 3 TIMES PER DAY FOR 2 DAYS AS NEEDED    polyethylene glycol (MIRALAX) 17 g packet Take 17 g by mouth daily    pregabalin (LYRICA) 150 MG capsule Take 1 capsule by mouth around 1 PM and 1 capsule at bedtime    Respiratory Therapy Supplies ([facility] CPAP FILTER) MISC     rOPINIRole (REQUIP) 0.5 MG tablet Take 1 tablet (0.5 mg) by mouth At Bedtime    SAXENDA 18 MG/3ML pen INJECT 3 MG SUBCUTANEOUS DAILY FOR 30 DAYS (INJECT 0.5 ML DAILY)    senna-docusate (SENOKOT-S/PERICOLACE) 8.6-50 MG tablet Take 1 tablet by mouth 2 times daily    sulfamethoxazole-trimethoprim (BACTRIM DS) 800-160 MG tablet TAKE 1 TABLET BY MOUTH EVERY DAY AS ONE DOSE    UNABLE TO FIND CPAP machine for home use at pressure: 11 cms water with oxygen at 3 lpm , nasal mask x1/3month with nasal cushion x2/mo Length of Need: 99 months, Frequency of use: Daily    vitamin D3 (CHOLECALCIFEROL) 250 mcg (37891 units) capsule Take 1 capsule by mouth daily    vitamin E (TOCOPHEROL) 1000 units (450 mg) CAPS capsule Take 1,000 Units by mouth daily     No current facility-administered medications for this visit.     Facility-Administered Medications Ordered in Other Visits   Medication    Lidocaine 1 % injection 0.5-5 mL    sodium chloride (PF) 0.9% PF flush 5-50 mL           Allergies:   Bee Venom       Bees      Doxycycline       Erythromycin     Hydrocodone-Acetaminophen                 Past Medical History:  Remarkable for moderate, persistent asthma, hypertension, bipolar II disorder, interstitial lung disease, cervical stenosis with myelopathy 2017.       Chronic midline low back pain  Facial cellulitis  Morbid (severe) obesity due to excess calories  Dental abscess  Hypoxia  Subcutaneous emphysema  Borderline personality disorder  Stenosis of cervical spine with myelopathy  Esophageal stricture  Gastroesophageal reflux disease   Hypertension   Interstitial lung disease  Moderate  "persistent asthma without complication  Onychomycosis  Restless leg syndrome  Seasonal allergies  Smoker  Stress  Respiratory bronchiolitis interstitial lung disease     Past Surgical History:  Remarkable for surgical fusion  Hysterectomy 1997  rectocele and Cystocele surgery  Cholecystectomy     Family History:    The patient's family history includes Asthma in her mother; Back Pain in her father; Hypertension in her father; Other Cancer in her father.    Social History:  The patient reports that she has been smoking cigarettes, around 0.5 packs per day. She started smoking about 23 years ago. She has a 30.00 pack-year smoking history. She has never used smokeless tobacco. She reports that she does not drink alcohol or use drugs.   PCP: Adam Del Toro  Marital Status: Single     Physical Exam:   /84 (BP Location: Left arm, Patient Position: Sitting, Cuff Size: Adult Large)   Pulse 83   Resp 16   Ht 1.626 m (5' 4\")   Wt 86.6 kg (191 lb)   LMP  (LMP Unknown)   SpO2 95%   BMI 32.79 kg/m     Wt Readings from Last 4 Encounters:   10/12/23 86.6 kg (191 lb)   09/20/23 86.9 kg (191 lb 8 oz)   04/21/23 91.2 kg (201 lb)   03/14/23 91.2 kg (201 lb)     Constitutional: Well-developed, appearing stated age; cooperative.  Eyes: Normal EOM, PERRLA, vision, conjunctiva, sclera.  ENT: Normal external ears, nose, hearing, lips, teeth, gums, throat. No mucous membrane lesions.  Neck: No mass or thyroid enlargement.  Resp: Breathing unlabored  MS: Painless forward elevation 120 degrees, right shoulder lacks more motion than the left.  No tenderness with shoulder palpation.  Passive external and internal rotation are normal.  Impingement is present in the left shoulder.  Fist formation intact, normal wrist ROM.  No visible swelling at MCPs or PIPs.  MTPs nontender.  Skin: No nail pitting, alopecia, rash, nodules or lesions.  Neuro: Normal cranial nerves  Psych: Normal judgement, orientation, memory, " affect.    Laboratory:       Latest Ref Rng & Units 12/3/2022     6:40 AM 12/4/2022     7:07 AM 3/3/2023    10:22 AM   RHEUM RESULTS   Albumin 3.5 - 5.2 g/dL   4.1    ALT 10 - 35 U/L   12    AST 10 - 35 U/L   21    Creatinine 0.51 - 0.95 mg/dL   0.93    CRP Inflammation <5.00 mg/L   11.80    GFR Estimate >60 mL/min/1.73m2   74    Hematocrit 35.0 - 47.0 % 23.1  22.4  38.0    Hemoglobin 11.7 - 15.7 g/dL 7.6  7.4  12.1    WBC 4.0 - 11.0 10e3/uL 11.7  10.9  6.5    RBC Count 3.80 - 5.20 10e6/uL 2.39  2.34  4.58    RDW 10.0 - 15.0 % 13.4  13.4  16.2    MCHC 31.5 - 36.5 g/dL 32.9  33.0  31.8    MCV 78 - 100 fL 97  96  83    Platelet Count 150 - 450 10e3/uL 199  231  260

## 2023-10-12 ENCOUNTER — OFFICE VISIT (OUTPATIENT)
Dept: RHEUMATOLOGY | Facility: CLINIC | Age: 53
End: 2023-10-12
Payer: COMMERCIAL

## 2023-10-12 VITALS
BODY MASS INDEX: 32.61 KG/M2 | OXYGEN SATURATION: 95 % | WEIGHT: 191 LBS | HEART RATE: 83 BPM | DIASTOLIC BLOOD PRESSURE: 84 MMHG | RESPIRATION RATE: 16 BRPM | SYSTOLIC BLOOD PRESSURE: 130 MMHG | HEIGHT: 64 IN

## 2023-10-12 DIAGNOSIS — Z87.39 HISTORY OF SERONEGATIVE INFLAMMATORY ARTHRITIS: ICD-10-CM

## 2023-10-12 PROCEDURE — 99214 OFFICE O/P EST MOD 30 MIN: CPT | Performed by: INTERNAL MEDICINE

## 2023-10-12 RX ORDER — PREDNISONE 5 MG/1
TABLET ORAL
Qty: 35 TABLET | Refills: 1 | Status: SHIPPED | OUTPATIENT
Start: 2023-10-12 | End: 2023-12-22

## 2023-10-12 RX ORDER — HYDROXYCHLOROQUINE SULFATE 200 MG/1
200 TABLET, FILM COATED ORAL 2 TIMES DAILY
Qty: 60 TABLET | Refills: 7 | Status: SHIPPED | OUTPATIENT
Start: 2023-10-12 | End: 2024-02-29

## 2023-10-12 ASSESSMENT — PAIN SCALES - GENERAL: PAINLEVEL: NO PAIN (1)

## 2023-10-12 NOTE — NURSING NOTE
"Chief Complaint   Patient presents with    RECHECK     History of seronegative inflammatory arthritis       Vitals:    10/12/23 1254   BP: 130/84   BP Location: Left arm   Patient Position: Sitting   Cuff Size: Adult Large   Pulse: 83   Resp: 16   SpO2: 95%   Weight: 86.6 kg (191 lb)   Height: 1.626 m (5' 4\")       Body mass index is 32.79 kg/m .    Leny Leal, SUNDAYF  "

## 2023-10-12 NOTE — PATIENT INSTRUCTIONS
Diagnosis:  1.  Inflammatory arthritis: Small joint symptoms in the hands and feet remain improved, but morning stiffness remains.  I recommend hydroxychloroquine to treat inflammatory arthritis.  2.  Rotator cuff tendinitis, left more so than right shoulder: Range of motion is improved, but still limited today.  I recommend ongoing physical therapy; steroid injection could also help if no improvement in a month.  4.  Pseudogout, right shoulder: No recurrence of inflammatory symptoms.    Plan:    1. Start hydroxychloroquine 200 mg twice a day.  Expect b maximal benefit in 4 months after starting. While using hydroxychloroquine, undergo annual examination of the retina for rare retinal toxicity.  First ophthalmology visit may occur in late 2024.  2.  Resume daily Physical therapy for left more so than right shoulder range of motion  3.  Recheck liver function, kidney function, and inflammation today.  4.  Use acetaminophen up to 1000 mg 3 times a day, and celecoxib 200 mg once daily for pain.  5.  Start short-term course of prednisone to get inflammatory symptoms under control: 15 mg daily for 1 week, then 10 mg daily for 1 week.

## 2023-10-13 ENCOUNTER — LAB (OUTPATIENT)
Dept: LAB | Facility: CLINIC | Age: 53
End: 2023-10-13
Payer: COMMERCIAL

## 2023-10-13 DIAGNOSIS — Z87.39 HISTORY OF SERONEGATIVE INFLAMMATORY ARTHRITIS: ICD-10-CM

## 2023-10-13 LAB
ALBUMIN SERPL BCG-MCNC: 4.3 G/DL (ref 3.5–5.2)
ALP SERPL-CCNC: 82 U/L (ref 35–104)
ALT SERPL W P-5'-P-CCNC: 18 U/L (ref 0–50)
ANION GAP SERPL CALCULATED.3IONS-SCNC: 9 MMOL/L (ref 7–15)
AST SERPL W P-5'-P-CCNC: 26 U/L (ref 0–45)
BILIRUB SERPL-MCNC: 0.3 MG/DL
BUN SERPL-MCNC: 12.5 MG/DL (ref 6–20)
CALCIUM SERPL-MCNC: 9.2 MG/DL (ref 8.6–10)
CHLORIDE SERPL-SCNC: 101 MMOL/L (ref 98–107)
CREAT SERPL-MCNC: 0.81 MG/DL (ref 0.51–0.95)
CRP SERPL-MCNC: 15.2 MG/L
DEPRECATED HCO3 PLAS-SCNC: 29 MMOL/L (ref 22–29)
EGFRCR SERPLBLD CKD-EPI 2021: 87 ML/MIN/1.73M2
ERYTHROCYTE [DISTWIDTH] IN BLOOD BY AUTOMATED COUNT: 13.8 % (ref 10–15)
GLUCOSE SERPL-MCNC: 85 MG/DL (ref 70–99)
HCT VFR BLD AUTO: 40.5 % (ref 35–47)
HGB BLD-MCNC: 13.4 G/DL (ref 11.7–15.7)
MCH RBC QN AUTO: 29.6 PG (ref 26.5–33)
MCHC RBC AUTO-ENTMCNC: 33.1 G/DL (ref 31.5–36.5)
MCV RBC AUTO: 90 FL (ref 78–100)
PLATELET # BLD AUTO: 257 10E3/UL (ref 150–450)
POTASSIUM SERPL-SCNC: 4.4 MMOL/L (ref 3.4–5.3)
PROT SERPL-MCNC: 7.1 G/DL (ref 6.4–8.3)
RBC # BLD AUTO: 4.52 10E6/UL (ref 3.8–5.2)
SODIUM SERPL-SCNC: 139 MMOL/L (ref 135–145)
WBC # BLD AUTO: 8.4 10E3/UL (ref 4–11)

## 2023-10-13 PROCEDURE — 86140 C-REACTIVE PROTEIN: CPT

## 2023-10-13 PROCEDURE — 36415 COLL VENOUS BLD VENIPUNCTURE: CPT

## 2023-10-13 PROCEDURE — 85027 COMPLETE CBC AUTOMATED: CPT

## 2023-10-13 PROCEDURE — 80053 COMPREHEN METABOLIC PANEL: CPT

## 2023-10-23 ENCOUNTER — VIRTUAL VISIT (OUTPATIENT)
Dept: SLEEP MEDICINE | Facility: CLINIC | Age: 53
End: 2023-10-23
Payer: COMMERCIAL

## 2023-10-23 VITALS — BODY MASS INDEX: 32.44 KG/M2 | WEIGHT: 190 LBS | HEIGHT: 64 IN

## 2023-10-23 DIAGNOSIS — G25.81 RESTLESS LEG SYNDROME: ICD-10-CM

## 2023-10-23 PROCEDURE — 99213 OFFICE O/P EST LOW 20 MIN: CPT | Mod: VID | Performed by: PHYSICIAN ASSISTANT

## 2023-10-23 RX ORDER — PREGABALIN 150 MG/1
CAPSULE ORAL
COMMUNITY
Start: 2023-10-23 | End: 2023-11-29

## 2023-10-23 ASSESSMENT — SLEEP AND FATIGUE QUESTIONNAIRES
HOW LIKELY ARE YOU TO NOD OFF OR FALL ASLEEP WHILE SITTING QUIETLY AFTER LUNCH WITHOUT ALCOHOL: SLIGHT CHANCE OF DOZING
HOW LIKELY ARE YOU TO NOD OFF OR FALL ASLEEP WHEN YOU ARE A PASSENGER IN A CAR FOR AN HOUR WITHOUT A BREAK: MODERATE CHANCE OF DOZING
HOW LIKELY ARE YOU TO NOD OFF OR FALL ASLEEP WHILE SITTING AND TALKING TO SOMEONE: SLIGHT CHANCE OF DOZING
HOW LIKELY ARE YOU TO NOD OFF OR FALL ASLEEP IN A CAR, WHILE STOPPED FOR A FEW MINUTES IN TRAFFIC: WOULD NEVER DOZE
HOW LIKELY ARE YOU TO NOD OFF OR FALL ASLEEP WHILE LYING DOWN TO REST IN THE AFTERNOON WHEN CIRCUMSTANCES PERMIT: HIGH CHANCE OF DOZING
HOW LIKELY ARE YOU TO NOD OFF OR FALL ASLEEP WHILE WATCHING TV: MODERATE CHANCE OF DOZING
HOW LIKELY ARE YOU TO NOD OFF OR FALL ASLEEP WHILE SITTING AND READING: MODERATE CHANCE OF DOZING
HOW LIKELY ARE YOU TO NOD OFF OR FALL ASLEEP WHILE SITTING INACTIVE IN A PUBLIC PLACE: MODERATE CHANCE OF DOZING

## 2023-10-23 ASSESSMENT — PAIN SCALES - GENERAL: PAINLEVEL: NO PAIN (0)

## 2023-10-23 NOTE — PROGRESS NOTES
Sleep Follow-Up Visit:    Date on this visit: 10/23/2023    Zayra Ramirez comes in today for follow-up visit today to review her medication management for restless legs syndrome.  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     Regarding ROBERT: She is on CPAP 14 cm with 3 lpm O2. She follows with Dr. Bonilla at [facility] yearly for her sleep disordered breathing.        For RLS: 150 mg pregabalin in the morning and afternoon (she feels taking it at night, she was having trouble sleeping, she feels it gives her more energy in the day) and 0.5 mg ropinirole at bedtime. She had been previously getting symptoms of restlessness in the arms and legs in the daytime. Moving the pregabalin earlier has taken care of that.   Her ferritin was 149 in 9/2020.  She is taking iron with Vit C about every day or two (was not tolerated daily in the past). It has not been causing GI upset at that interval.   Last ferritin was 9/2020: 149.    She feels her RLS is pretty stable.    She had spinal surgery 11/30/2022 - PISF T8-pelvis with bilateral stacked pelvic fixation; TLIF-SPO L1-S1 (5 levels); use of Infuse BMP and allograft (Sembrano/Shavon) for multilevel sponydylosis and stenosis, thoracolumbar junction kyphosis.  [Implants: Medtronic Solera, 5.5 mm CoCr rods x4 (UNID rods x2); Capstone Control PTC cages; SI-Bone Granite screws x 4].   Her radiculopathy has been much improved.       She has not noticed any worsening depression. She has mild swelling in her legs. She has not had weight gain. She is on weight loss medications.   She has not noticed any compulsive behaviors with ropinirole.      Bedtime: 8-9 PM. Falls asleep: 30  min.  Wake Time: 7 AM, may lay in bed until 8 AM if RA is bad.   Middle of the night awakenings: 0  Naps: not in the summer.      Past medical/surgical history, family history, social history, medications and allergies were reviewed.      Problem List:  Patient Active Problem List    Diagnosis Date Noted    Class 1 obesity with serious comorbidity and body mass index (BMI) of 32.0 to 32.9 in adult 08/08/2015     Priority: High    Cannabis abuse, daily use 09/20/2023     Priority: Medium    S/P spinal fusion 11/30/2022     Priority: Medium    History of pelvic surgery 10/27/2022     Priority: Medium    Voiding dysfunction 10/27/2022     Priority: Medium    Pelvic floor dysfunction 10/27/2022     Priority: Medium    Urinary hesitancy 10/27/2022     Priority: Medium    Urinary frequency 10/27/2022     Priority: Medium    Other osteoporosis without current pathological fracture 08/31/2022     Priority: Medium    Acute pain of right shoulder 07/13/2022     Priority: Medium    Cervical radiculopathy 09/21/2021     Priority: Medium     Added automatically from request for surgery [id number]      Lumbar radiculopathy, chronic 06/15/2021     Priority: Medium     Added automatically from request for surgery [id number]      Chronic neck pain 05/19/2021     Priority: Medium    Glucose intolerance 04/10/2021     Priority: Medium    Degenerative lumbar spinal stenosis 08/31/2020     Priority: Medium     Added automatically from request for surgery [id number]      Sacro-iliac pain 06/09/2020     Priority: Medium     Added automatically from request for surgery [id number]      Calculus of gallbladder without cholecystitis without obstruction 01/21/2020     Priority: Medium    Symptomatic cholelithiasis 01/21/2020     Priority: Medium    Limited opening of mandible 01/14/2020     Priority: Medium    Inflammatory arthritis 11/27/2019     Priority: Medium    Lumbar spondylosis 11/04/2019     Priority: Medium     Added automatically from request  for surgery [id number]      Spinal epidural abscess 08/19/2019     Priority: Medium    Respiratory bronchiolitis interstitial lung disease (H)      Priority: Medium    Subcutaneous emphysema (H24) 04/05/2018     Priority: Medium    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) 10/13/2017     Priority: Medium    Oral lesion 02/14/2017     Priority: Medium    Interstitial lung disease (H) 11/17/2016     Priority: Medium     Overview:   Patient sees Pulm    Formatting of this note might be different from the original.  Patient sees Pulm      Derangement of temporomandibular joint 11/15/2016     Priority: Medium    Displacement of articular disc of temporomandibular joint with reduction 11/15/2016     Priority: Medium    Arthralgia of temporomandibular joint 09/20/2016     Priority: Medium    Articular disc disorder of temporomandibular joint 09/20/2016     Priority: Medium    Myofascial pain 09/20/2016     Priority: Medium    Esophageal stricture 07/18/2016     Priority: Medium     Overview:   With Hiatal hernia; on Acid blocker lifelong    Formatting of this note might be different from the original.  With Hiatal hernia; on Acid blocker lifelong      Diaphragmatic hernia 07/11/2016     Priority: Medium    Gastro-esophageal reflux disease with esophagitis 07/11/2016     Priority: Medium    Choking sensation 06/22/2016     Priority: Medium    Stress 06/15/2016     Priority: Medium    Hypoxia 06/08/2016     Priority: Medium    Onychomycosis 06/01/2016     Priority: Medium    Restless leg syndrome 06/01/2016     Priority: Medium    Dental abscess 08/08/2015     Priority: Medium    Facial cellulitis 03/05/2015     Priority: Medium    Chronic bilateral low back pain without sciatica 11/10/2014     Priority: Medium     Diagnosis updated by automated process. Provider to review and confirm.      HTN (hypertension) 01/28/2013     Priority: Medium    Borderline personality disorder (H) 01/12/2011     Priority: Medium     Obstruction of esophagus 03/23/2010     Priority: Medium    Moderate persistent asthma without complication 03/23/2010     Priority: Medium     Overview:   Patient sees Pulm    Formatting of this note might be different from the original.  Patient sees Pulm      Seasonal allergies 03/23/2010     Priority: Medium    Bipolar 2 disorder (H) 03/23/2010     Priority: Medium     Formatting of this note might be different from the original.  Sees Psych          Impression/Plan:    (G25.81) Restless leg syndrome  Comment: Zayra's RLS has been stable. She has been on 150 mg pregabalin in the morning and midday and 0.5 mg ropinirole at night. She also has been on iron every 1-2 days. She had spinal fusion for multilevel sponydylosis and stenosis last November and has had improvements in those symptoms. Zayra reports feeling more energy from pregabalin, which is atypical. I would like to see if we can reduce the dose, since I think part of what it was treating in the day was symptoms that have now been improved with her spinal surgeries.   Plan:   Continue ropinirole 0.5 mg at night. 150 mg pregabalin in the morning and mid day. When she is due for her next fill (in a month), I will try reducing the pregabalin to 100 mg. She was again advised to watch for signs of augmentation.  Continue iron with Vit C every other day. We will recheck her ferritin next year.     She will follow up with me in about 1 year(s).     25 minutes were spent on the date of the encounter doing chart review, history and exam, documentation and further activities as noted above.     Bennett Goltz, PA-C    CC: No ref. provider found

## 2023-10-23 NOTE — PROGRESS NOTES
Virtual Visit Details    Type of service:  Video Visit   Video Start Time: 10:59 AM  Video End Time:11:21 AM    Originating Location (pt. Location): Home    Distant Location (provider location):  Off-site  Platform used for Video Visit: Well

## 2023-10-23 NOTE — NURSING NOTE
Is the patient currently in the state of [place]? YES    Visit mode:VIDEO    If the visit is dropped, the patient can be reconnected by: VIDEO VISIT: Text to cell phone:   Telephone Information:   Mobile [phone]       Will anyone else be joining the visit? NO  (If patient encounters technical issues they should call [phone]    How would you like to obtain your AVS? MyChart    Are changes needed to the allergy or medication list? No  Patient states is taking azelaftine and stiolto  Reason for visit: NAKUL CAINF

## 2023-10-25 NOTE — NURSING NOTE
Return in about 1 year reminder created and to be send via [employer].     Arvind Juan MA  [facility]

## 2023-11-04 ENCOUNTER — HEALTH MAINTENANCE LETTER (OUTPATIENT)
Age: 53
End: 2023-11-04

## 2023-11-05 DIAGNOSIS — K21.9 GASTROESOPHAGEAL REFLUX DISEASE, UNSPECIFIED WHETHER ESOPHAGITIS PRESENT: ICD-10-CM

## 2023-11-08 ENCOUNTER — VIRTUAL VISIT (OUTPATIENT)
Dept: SURGERY | Facility: CLINIC | Age: 53
End: 2023-11-08
Payer: COMMERCIAL

## 2023-11-08 DIAGNOSIS — E66.811 CLASS 1 OBESITY DUE TO EXCESS CALORIES WITHOUT SERIOUS COMORBIDITY WITH BODY MASS INDEX (BMI) OF 32.0 TO 32.9 IN ADULT: Primary | ICD-10-CM

## 2023-11-08 DIAGNOSIS — E66.09 CLASS 1 OBESITY DUE TO EXCESS CALORIES WITHOUT SERIOUS COMORBIDITY WITH BODY MASS INDEX (BMI) OF 32.0 TO 32.9 IN ADULT: Primary | ICD-10-CM

## 2023-11-08 DIAGNOSIS — Z71.3 NUTRITIONAL COUNSELING: ICD-10-CM

## 2023-11-08 DIAGNOSIS — E74.39 GLUCOSE INTOLERANCE: ICD-10-CM

## 2023-11-08 PROCEDURE — 97803 MED NUTRITION INDIV SUBSEQ: CPT | Mod: 95 | Performed by: DIETITIAN, REGISTERED

## 2023-11-08 NOTE — LETTER
11/8/2023         RE: Zayra Ramirez  [address] [place] [address]        Dear Colleague,    Thank you for referring your patient, Zayra Ramirez, to the [facility] [place]. Please see a copy of my visit note below.    Zayra Ramirez is a 53 year old who is being evaluated via a billable video visit.      How would you like to obtain your AVS? MyChart  If the video visit is dropped, the invitation should be resent by: Send to e-mail at: [email or web address]  Will anyone else be joining your video visit? No        Medical  Weight Loss Follow-Up Diet Evaluation  Assessment:  Zayra is presenting today for a follow up weight management nutrition consultation.  This patient has had an initial appointment and was referred by Dr. Hong  for MNT as treatment for Obesity.     Weight loss medication:  Saxenda .   Pt's weight is 188 lbs   Initial weight: 219 lbs   Weight change: down 31 lbs         9/20/2023    10:06 AM   Changes and Difficulties   I have made the following changes to my diet since my last visit: lowered carbs   With regards to my diet, I am still struggling with: enough protein   I have made the following changes to my activity/exercise since my last visit: increased walking   With regards to my activity/exercise, I am still struggling with: walking over a mile atatime     BMI: There is no height or weight on file to calculate BMI.  Ideal body weight: 54.7 kg (120 lb 9.5 oz)  Adjusted ideal body weight: 67.3 kg (148 lb 5.7 oz)    Estimated RMR ([place]-St Jeor equation):   1452 kcals x 1.3 (light active) = 1888 kcals (for weight maintenance)     Recommended Protein Intake: 60-80 grams of protein/day  Patient Active Problem List:  Patient Active Problem List   Diagnosis     Chronic bilateral low back pain without sciatica     Facial cellulitis     Class 1 obesity with serious comorbidity and body mass index (BMI) of 32.0 to 32.9 in  adult     Dental abscess     Hypoxia     Subcutaneous emphysema (H24)     Borderline personality disorder (H)     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)     Esophageal stricture     Obstruction of esophagus     HTN (hypertension)     Interstitial lung disease (H)     Moderate persistent asthma without complication     Onychomycosis     Restless leg syndrome     Seasonal allergies     Stress     Respiratory bronchiolitis interstitial lung disease (H)     Spinal epidural abscess     Lumbar spondylosis     Inflammatory arthritis     Arthralgia of temporomandibular joint     Articular disc disorder of temporomandibular joint     Derangement of temporomandibular joint     Calculus of gallbladder without cholecystitis without obstruction     Displacement of articular disc of temporomandibular joint with reduction     Myofascial pain     Oral lesion     Symptomatic cholelithiasis     Sacro-iliac pain     Degenerative lumbar spinal stenosis     Glucose intolerance     Chronic neck pain     Lumbar radiculopathy, chronic     Cervical radiculopathy     Acute pain of right shoulder     Other osteoporosis without current pathological fracture     Diaphragmatic hernia     Bipolar 2 disorder (H)     Choking sensation     Gastro-esophageal reflux disease with esophagitis     Limited opening of mandible     History of pelvic surgery     Voiding dysfunction     Pelvic floor dysfunction     Urinary hesitancy     Urinary frequency     S/P spinal fusion     Cannabis abuse, daily use       Progress on goals from last visit: Feels that things are going better in regards to protein consumption.  Patient reports that she has been more consistent with eating 3 meals/day.     Dietary Recall:  Breakfast: Protein Shake or eggs   Lunch:Nuts or Protein Bar  Dinner:meat, vegetables  Typical snacks: fruit  Beverages: water-trying to increase consumption, noting that she has been using SF flavoring to help increase consumption, Diet Pop 3  times/day, Coffee Energy Drink  Exercise: walking the dog-1/2 mile/day     Nutrition Diagnosis:    Overweight/Obesity (NC 3.3) related to overeating and poor lifestyle habits as evidenced by patient's subjective statements and BMI of 32.3 kg/m2.     Intervention:  Food and/or nutrient delivery: balanced meals, adequate protein   Nutrition education: none   Nutrition counseling: provided support and encouragement    Monitoring/Evaluation:    Goals:  Switch out energy drink for coffee with added protein shake as an alternative.    Continue to focus on adequate protein, aiming for 20-30 grams of protein/meal, 3 meals/day.     Patient to follow up in 2 month(s) with DANIEL        Video-Visit Details    Type of service:  Video Visit    Video Start Time (time video started): 8:22 am     Video End Time (time video stopped): 8:34 am     Originating Location (pt. Location): Home      Distant Location (provider location):  Off-site    Mode of Communication:  Video Conference via [facility]    Physician has received verbal consent for a Video Visit from the patient? Yes      Saumya Dobbins RD          Again, thank you for allowing me to participate in the care of your patient.        Sincerely,        Saumya Dobbins RD

## 2023-11-08 NOTE — PROGRESS NOTES
Zayra Ramirez is a 53 year old who is being evaluated via a billable video visit.      How would you like to obtain your AVS? MyChart  If the video visit is dropped, the invitation should be resent by: Send to e-mail at: [email or web address]  Will anyone else be joining your video visit? No        Medical  Weight Loss Follow-Up Diet Evaluation  Assessment:  Zayra is presenting today for a follow up weight management nutrition consultation.  This patient has had an initial appointment and was referred by Dr. Hong  for MNT as treatment for Obesity.     Weight loss medication:  Saxenda .   Pt's weight is 188 lbs   Initial weight: 219 lbs   Weight change: down 31 lbs         9/20/2023    10:06 AM   Changes and Difficulties   I have made the following changes to my diet since my last visit: lowered carbs   With regards to my diet, I am still struggling with: enough protein   I have made the following changes to my activity/exercise since my last visit: increased walking   With regards to my activity/exercise, I am still struggling with: walking over a mile atatime     BMI: There is no height or weight on file to calculate BMI.  Ideal body weight: 54.7 kg (120 lb 9.5 oz)  Adjusted ideal body weight: 67.3 kg (148 lb 5.7 oz)    Estimated RMR ([place]-St Jeor equation):   1452 kcals x 1.3 (light active) = 1888 kcals (for weight maintenance)     Recommended Protein Intake: 60-80 grams of protein/day  Patient Active Problem List:  Patient Active Problem List   Diagnosis    Chronic bilateral low back pain without sciatica    Facial cellulitis    Class 1 obesity with serious comorbidity and body mass index (BMI) of 32.0 to 32.9 in adult    Dental abscess    Hypoxia    Subcutaneous emphysema (H24)    Borderline personality disorder (H)    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)    Esophageal stricture    Obstruction of esophagus    HTN (hypertension)    Interstitial lung disease (H)    Moderate persistent  asthma without complication    Onychomycosis    Restless leg syndrome    Seasonal allergies    Stress    Respiratory bronchiolitis interstitial lung disease (H)    Spinal epidural abscess    Lumbar spondylosis    Inflammatory arthritis    Arthralgia of temporomandibular joint    Articular disc disorder of temporomandibular joint    Derangement of temporomandibular joint    Calculus of gallbladder without cholecystitis without obstruction    Displacement of articular disc of temporomandibular joint with reduction    Myofascial pain    Oral lesion    Symptomatic cholelithiasis    Sacro-iliac pain    Degenerative lumbar spinal stenosis    Glucose intolerance    Chronic neck pain    Lumbar radiculopathy, chronic    Cervical radiculopathy    Acute pain of right shoulder    Other osteoporosis without current pathological fracture    Diaphragmatic hernia    Bipolar 2 disorder (H)    Choking sensation    Gastro-esophageal reflux disease with esophagitis    Limited opening of mandible    History of pelvic surgery    Voiding dysfunction    Pelvic floor dysfunction    Urinary hesitancy    Urinary frequency    S/P spinal fusion    Cannabis abuse, daily use       Progress on goals from last visit: Feels that things are going better in regards to protein consumption.  Patient reports that she has been more consistent with eating 3 meals/day.     Dietary Recall:  Breakfast: Protein Shake or eggs   Lunch:Nuts or Protein Bar  Dinner:meat, vegetables  Typical snacks: fruit  Beverages: water-trying to increase consumption, noting that she has been using SF flavoring to help increase consumption, Diet Pop 3 times/day, Coffee Energy Drink  Exercise: walking the dog-1/2 mile/day     Nutrition Diagnosis:    Overweight/Obesity (NC 3.3) related to overeating and poor lifestyle habits as evidenced by patient's subjective statements and BMI of 32.3 kg/m2.     Intervention:  Food and/or nutrient delivery: balanced meals, adequate protein    Nutrition education: none   Nutrition counseling: provided support and encouragement    Monitoring/Evaluation:    Goals:  Switch out energy drink for coffee with added protein shake as an alternative.    Continue to focus on adequate protein, aiming for 20-30 grams of protein/meal, 3 meals/day.     Patient to follow up in 2 month(s) with RD        Video-Visit Details    Type of service:  Video Visit    Video Start Time (time video started): 8:22 am     Video End Time (time video stopped): 8:34 am     Originating Location (pt. Location): Home      Distant Location (provider location):  Off-site    Mode of Communication:  Video Conference via [facility]    Physician has received verbal consent for a Video Visit from the patient? Yes      Saumya Dobbins, RD

## 2023-11-10 ENCOUNTER — MYC REFILL (OUTPATIENT)
Dept: INTERNAL MEDICINE | Facility: CLINIC | Age: 53
End: 2023-11-10
Payer: COMMERCIAL

## 2023-11-10 DIAGNOSIS — G25.81 RESTLESS LEG SYNDROME: ICD-10-CM

## 2023-11-10 RX ORDER — PREGABALIN 150 MG/1
CAPSULE ORAL
Status: CANCELLED | OUTPATIENT
Start: 2023-11-10

## 2023-11-10 RX ORDER — OMEPRAZOLE 40 MG/1
40 CAPSULE, DELAYED RELEASE ORAL DAILY
Qty: 90 CAPSULE | Refills: 3 | Status: SHIPPED | OUTPATIENT
Start: 2023-11-10

## 2023-11-15 RX ORDER — PREGABALIN 100 MG/1
CAPSULE ORAL
Qty: 60 CAPSULE | Refills: 0 | Status: SHIPPED | OUTPATIENT
Start: 2023-11-15 | End: 2023-11-30 | Stop reason: DRUGHIGH

## 2023-11-15 NOTE — TELEPHONE ENCOUNTER
Directions on script were changed on 10/23/23 visit and that inactivated the previous script, new one needed.      Pending Prescriptions:                       Disp   Refills    pregabalin (LYRICA) 150 MG capsule                            Sig: Take 1 capsule by mouth in the morning and around           1 PM          Last Written Prescription Date:  7/26/23  Last Fill Quantity: 60   # refills: 3  Last Office Visit: 10/23/23  Future Office visit:  NA     Routing refill request to provider for review/approval because:  Drug not on the [facility], [facility] or [facility] refill protocol or controlled substance

## 2023-11-16 NOTE — TELEPHONE ENCOUNTER
I reduced the dose of the pregabalin from 150 mg in the morning and midday to 100 mg capsules in the morning and midday. Hopefully, she does well on the reduced dose since her symptoms should be improved from her spinal fusion.  Bennett Goltz, PA-C

## 2023-11-29 ENCOUNTER — MYC REFILL (OUTPATIENT)
Dept: INTERNAL MEDICINE | Facility: CLINIC | Age: 53
End: 2023-11-29
Payer: COMMERCIAL

## 2023-11-29 DIAGNOSIS — G25.81 RESTLESS LEG SYNDROME: ICD-10-CM

## 2023-11-30 ENCOUNTER — ANCILLARY PROCEDURE (OUTPATIENT)
Dept: CT IMAGING | Facility: CLINIC | Age: 53
End: 2023-11-30
Attending: ORTHOPAEDIC SURGERY
Payer: COMMERCIAL

## 2023-11-30 ENCOUNTER — OFFICE VISIT (OUTPATIENT)
Dept: ORTHOPEDICS | Facility: CLINIC | Age: 53
End: 2023-11-30
Payer: COMMERCIAL

## 2023-11-30 DIAGNOSIS — S12.9XXS CERVICAL PSEUDOARTHROSIS, SEQUELA: Primary | ICD-10-CM

## 2023-11-30 DIAGNOSIS — Z98.1 STATUS POST THORACIC SPINAL FUSION: ICD-10-CM

## 2023-11-30 DIAGNOSIS — M47.12 CERVICAL SPONDYLOSIS WITH MYELOPATHY: ICD-10-CM

## 2023-11-30 DIAGNOSIS — M54.12 CERVICAL RADICULOPATHY: ICD-10-CM

## 2023-11-30 PROCEDURE — 72128 CT CHEST SPINE W/O DYE: CPT | Mod: GC | Performed by: RADIOLOGY

## 2023-11-30 PROCEDURE — 72131 CT LUMBAR SPINE W/O DYE: CPT | Mod: GC | Performed by: RADIOLOGY

## 2023-11-30 PROCEDURE — 99214 OFFICE O/P EST MOD 30 MIN: CPT | Performed by: ORTHOPAEDIC SURGERY

## 2023-11-30 RX ORDER — PREGABALIN 150 MG/1
CAPSULE ORAL
Qty: 60 CAPSULE | Refills: 3 | Status: SHIPPED | OUTPATIENT
Start: 2023-12-09 | End: 2024-04-15

## 2023-11-30 NOTE — LETTER
11/30/2023         RE: Zayra PADRON James  [address] [place] [address]        Dear Colleague,    Thank you for referring your patient, Zayra Ramirez, to the [facility] [place]. Please see a copy of my visit note below.    Spine Surgical Hx:  10/16/2017 - ACDF with plate fixation C3-C5 (2 levels); use of Cornerstone allograft (Dr. Rylan Monique, [facility]).  [Implants: Medtronic Zevo plate].  08/08/2019 - SCS implantation ([place]); complicated by epidural abscess MSSA culture positive, treated with drainage/laminectomy and IV antibiotics.  Thus, the SCS was removed.  08/19/2019 - Laminectomies T11-L1 (T12-L2) ([facility]).  11/30/2022 - PISF T8-pelvis with bilateral stacked pelvic fixation; TLIF-SPO L1-S1 (5 levels); use of Infuse BMP and allograft ([employer]) for multilevel sponydylosis and stenosis, thoracolumbar junction kyphosis.  [Implants: Medtronic Solera, 5.5 mm CoCr rods x4 (UNID rods x2); Capstone Control PTC cages; SI-Bone Granite screws x 4].     BMD Hx:  11/24/21 - DEXA spine/hip/wrist.  T-score = -1.1 (Left femoral neck).  Imp: Osteopenia.  12/8/21 - Saw Dr. Guerra ([facility]).  P> RTC 6 wks; had not been seen since.      In-Person Visit    Chief Complaint   Patient presents with    RECHECK     RETURN SURGICAL SPINE       S>  53 year old female, RHD, 1 yr postop, last seen at 6 mos.  Doing well; low back better vs preop.  However, continued to have neck pain.    Accompanied by stepmom.  Patient, she continues to do very well in regards to her back.  Continues to be very happy with surgery.  Definitely much better compared to preoperative.    However, similar to last visit, continues to complain of neck symptoms.  50% neck, 50% arms right greater than left.  50% neck, 50% arms right greater than left.    Has not done PT for her neck in a while.    Oswestry (KATIE) Questionnaire        11/29/2023    12:16 PM   OSWESTRY DISABILITY INDEX   Count 9   Sum 11    Oswestry Score (%) 24.44 %      S/p PISF T8-pelvis (11/30/22):  KATIE preop          70%  6wk                    40%  3mo                   45%  6mo                   18%  1yr  24.44%      Neck Disability Index (NDI) Questionnaire        11/29/2023    12:25 PM   Neck Disability Index (NDI)   Neck Disability Index: Count 9   NDI: Total Score = SUM (points for all 10 findings) Incomplete   Neck Disability in Percent = (Total Score) / 50 * 100 Incomplete      Neck Disability Index (NDI) Questionnaire        11/29/2023    12:25 PM   Neck Disability Index   Count 9   Sum 16   Raw Score: /50 17.78      NDI 2/13/23       42.5%  NDI 5/25/23       34%  NDI 11/29/23 35.56%      Visual Analog Pain Scale  Back Pain Scale 0-10: 1  Right leg pain: 1  Neck Pain Scale 0-10: 4  Right arm pain: 4 (pain from shoulder to fingers)  Left arm pain: 3    PROMIS-10 Scores  Global Mental Health Score: (P) 15  Global Physical Health Score: (P) 14  PROMIS TOTAL - SUBSCORES: (P) 29    O>   Alert, oriented x 3, cooperative.  Not in CP distress.  LMP  (LMP Unknown)   Surgical incision(s) well-healed, no sign of infection.  Ambulates independently.  Grossly neurologically intact.    Imaging:    Thoracic and lumbar CT taken today reviewed.  Shows postsurgical changes with posterior instrumentation T8 to pelvis.  Shows already solid arthrodesis at most levels.  Still with questionable posterior fusion at T10-11 and T11-12, with some persistent vacuum signal within the discs.  There also still is persistent cleft posteriorly.  No loosening of screws adjacent to these levels.  There is some lucency around the left T8 screw (UIV), suggestive of incomplete union at T8-9.  There is also evidence of proximal or suprajacent level spondylosis at T7-8, with disc collapse, vacuum disc signal and mild spondylolisthesis.    A>   53/f RHD with:  Thoracolumbar spine:  1.  1 yr s/p PISF T8-pelvis; TLIF-SPO L1-S1 (5 levels) (11/20/2022), doing well, with improved  posture and preoperative pain.  2.  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  3.  Suprajacent spondylosis T7-8; asymptomatic.  Cervical spine:  1.  Pseudarthrosis s/p ACDF C3-4 and C4-5 (2017, Dr. Monique), with broken C3 screws and lack of bridging bone on CT scan (2/3/23), and motion demonstrated on flex-ext x-rays (2/16/23).  2.  Spondylolisthesis C2-3.  3. Advanced multilevel cervical facet arthropathy C2-T1.   4.  Chronic axial neck pain.  5.  Bilateral radicular arm pain.    P>    Had good discussion with patient and stepmom.  On 1 hand, I am very happy that the lumbar surgery we performed last year seems to have helped her significantly, and she is highly satisfied with it.  On the other hand, I am sorry to hear that her neck continues to progressively get worse and is now to the point that she would like to proceed with surgical intervention.  We discussed the problem with her neck.  Essentially, the anterior fusion that was performed 6 years ago did not fully take, and 2 of the screws at C3 are now broken.  I explained to her that the pseudoarthrosis likely is contributing to her symptoms.  At the same time, she had developed instability/spondylolisthesis at the level above (C2-3).  This may also be a contributor to her pain, and thus it would be reasonable to consider posterior instrumented spinal fusion C2-C5.  At same time, she also has some canal stenosis that may benefit from laminectomies C3 and C4.  We could then also use the laminectomy bone for our bone graft, so that we do not have to harvest iliac crest bone anymore.    At the same time, however, she also has advanced facet arthropathy at the lower cervical levels.  At this point, it is very difficult to a certain how much of her pain may be coming from these levels.  However, if we would address these as well, she would essentially end up with fusion of her entire neck C2-T2.  This will be a much bigger surgery,  and with higher risk of complications including wound infection.    My recommendation is to perform the smaller surgery (C2-C5 fusion with decompression), with the understanding that down the road she may need to have this fusion extended distally if she continues to have pain coming from those levels.  We discussed the rationale, risks, benefits and alternatives.  Patient elects to proceed.  We discussed bone graft options; she agreed to use of local bone graft and crushed cancellous allograft (spinal graft technologies, [employer]).    - PT for neck pain ([facility]).  - Case request: PISF C2-C5; Laminectomy C3,C4; use of local bone and allograft.  - PAC referral.    25 minutes spent on the date of the encounter doing chart review/review of outside records/review of test results/interpretation of tests/patient visit/documentation/discussion with other provider(s)/discussion with patient and family.      Philip Wilhelm MD    Orthopaedic Spine Surgery  Dept Orthopaedic Surgery, [facility]  [phone] office, [phone] pager  [email or web address]

## 2023-11-30 NOTE — PROGRESS NOTES
Spine Surgical Hx:  10/16/2017 - ACDF with plate fixation C3-C5 (2 levels); use of Cornerstone allograft (Dr. Rylan Monique, [facility]).  [Implants: Medtronic Zevo plate].  08/08/2019 - SCS implantation ([facility]); complicated by epidural abscess MSSA culture positive, treated with drainage/laminectomy and IV antibiotics.  Thus, the SCS was removed.  08/19/2019 - Laminectomies T11-L1 (T12-L2) ([facility]).  11/30/2022 - PISF T8-pelvis with bilateral stacked pelvic fixation; TLIF-SPO L1-S1 (5 levels); use of Infuse BMP and allograft ([employer]) for multilevel sponydylosis and stenosis, thoracolumbar junction kyphosis.  [Implants: Medtronic Solera, 5.5 mm CoCr rods x4 (UNID rods x2); Capstone Control PTC cages; SI-Bone Granite screws x 4].     BMD Hx:  11/24/21 - DEXA spine/hip/wrist.  T-score = -1.1 (Left femoral neck).  Imp: Osteopenia.  12/8/21 - Saw Dr. Guerra ([facility]).  P> RTC 6 wks; had not been seen since.      In-Person Visit    Chief Complaint   Patient presents with    RECHECK     RETURN SURGICAL SPINE       S>  53 year old female, RHD, 1 yr postop, last seen at 6 mos.  Doing well; low back better vs preop.  However, continued to have neck pain.    Accompanied by stepmom.  Patient, she continues to do very well in regards to her back.  Continues to be very happy with surgery.  Definitely much better compared to preoperative.    However, similar to last visit, continues to complain of neck symptoms.  50% neck, 50% arms right greater than left.  50% neck, 50% arms right greater than left.    Has not done PT for her neck in a while.    Oswestry (KATIE) Questionnaire        11/29/2023    12:16 PM   OSWESTRY DISABILITY INDEX   Count 9   Sum 11   Oswestry Score (%) 24.44 %      S/p PISF T8-pelvis (11/30/22):  KATIE preop          70%  6wk                    40%  3mo                   45%  6mo                   18%  1yr  24.44%      Neck Disability Index (NDI) Questionnaire        11/29/2023    12:25 PM   Neck  Disability Index (NDI)   Neck Disability Index: Count 9   NDI: Total Score = SUM (points for all 10 findings) Incomplete   Neck Disability in Percent = (Total Score) / 50 * 100 Incomplete      Neck Disability Index (NDI) Questionnaire        11/29/2023    12:25 PM   Neck Disability Index   Count 9   Sum 16   Raw Score: /50 17.78      NDI 2/13/23       42.5%  NDI 5/25/23       34%  NDI 11/29/23 35.56%      Visual Analog Pain Scale  Back Pain Scale 0-10: 1  Right leg pain: 1  Neck Pain Scale 0-10: 4  Right arm pain: 4 (pain from shoulder to fingers)  Left arm pain: 3    PROMIS-10 Scores  Global Mental Health Score: (P) 15  Global Physical Health Score: (P) 14  PROMIS TOTAL - SUBSCORES: (P) 29    O>   Alert, oriented x 3, cooperative.  Not in CP distress.  LMP  (LMP Unknown)   Surgical incision(s) well-healed, no sign of infection.  Ambulates independently.  Grossly neurologically intact.    Imaging:    Thoracic and lumbar CT taken today reviewed.  Shows postsurgical changes with posterior instrumentation T8 to pelvis.  Shows already solid arthrodesis at most levels.  Still with questionable posterior fusion at T10-11 and T11-12, with some persistent vacuum signal within the discs.  There also still is persistent cleft posteriorly.  No loosening of screws adjacent to these levels.  There is some lucency around the left T8 screw (UIV), suggestive of incomplete union at T8-9.  There is also evidence of proximal or suprajacent level spondylosis at T7-8, with disc collapse, vacuum disc signal and mild spondylolisthesis.    A>   53/f RHD with:  Thoracolumbar spine:  1.  1 yr s/p PISF T8-pelvis; TLIF-SPO L1-S1 (5 levels) (11/20/2022), doing well, with improved posture and preoperative pain.  2.  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  3.  Suprajacent spondylosis T7-8; asymptomatic.  Cervical spine:  1.  Pseudarthrosis s/p ACDF C3-4 and C4-5 (2017, Dr. Monique), with broken C3 screws and  lack of bridging bone on CT scan (2/3/23), and motion demonstrated on flex-ext x-rays (2/16/23).  2.  Spondylolisthesis C2-3.  3. Advanced multilevel cervical facet arthropathy C2-T1.   4.  Chronic axial neck pain.  5.  Bilateral radicular arm pain.    P>    Had good discussion with patient and stepmom.  On 1 hand, I am very happy that the lumbar surgery we performed last year seems to have helped her significantly, and she is highly satisfied with it.  On the other hand, I am sorry to hear that her neck continues to progressively get worse and is now to the point that she would like to proceed with surgical intervention.  We discussed the problem with her neck.  Essentially, the anterior fusion that was performed 6 years ago did not fully take, and 2 of the screws at C3 are now broken.  I explained to her that the pseudoarthrosis likely is contributing to her symptoms.  At the same time, she had developed instability/spondylolisthesis at the level above (C2-3).  This may also be a contributor to her pain, and thus it would be reasonable to consider posterior instrumented spinal fusion C2-C5.  At same time, she also has some canal stenosis that may benefit from laminectomies C3 and C4.  We could then also use the laminectomy bone for our bone graft, so that we do not have to harvest iliac crest bone anymore.    At the same time, however, she also has advanced facet arthropathy at the lower cervical levels.  At this point, it is very difficult to a certain how much of her pain may be coming from these levels.  However, if we would address these as well, she would essentially end up with fusion of her entire neck C2-T2.  This will be a much bigger surgery, and with higher risk of complications including wound infection.    My recommendation is to perform the smaller surgery (C2-C5 fusion with decompression), with the understanding that down the road she may need to have this fusion extended distally if she continues  to have pain coming from those levels.  We discussed the rationale, risks, benefits and alternatives.  Patient elects to proceed.  We discussed bone graft options; she agreed to use of local bone graft and crushed cancellous allograft (spinal graft technologies, [employer]).    - PT for neck pain ([facility]).  - Case request: PISF C2-C5; Laminectomy C3,C4; use of local bone and allograft.  - PAC referral.    25 minutes spent on the date of the encounter doing chart review/review of outside records/review of test results/interpretation of tests/patient visit/documentation/discussion with other provider(s)/discussion with patient and family.      Philip Wilhelm MD    Orthopaedic Spine Surgery  Dept Orthopaedic Surgery, [facility]  [phone] office, [phone] pager  [email or web address]

## 2023-12-01 NOTE — TELEPHONE ENCOUNTER
I've sent in a prescription for 150 mg pregabalin to be taking twice daily (morning and midday) as the 100 mg pregabalin bid was not effective. I spoke with her and will have her take 3 of the 100 mg pregabalin for the next 10 days (to use up the rest of her prescription) and the 150 mg prescription was written with a start date of 12/9. She was ok with that plan.  Bennett Goltz, PA-C

## 2023-12-01 NOTE — PROGRESS NOTES
Teaching Flowsheet   Relevant Diagnosis: spine  Teaching Topic: preop     Person(s) involved in teaching:   Patient     Motivation Level:  Asks Questions: Yes  Eager to Learn: Yes  Cooperative: Yes  Receptive (willing/able to accept information): Yes  Any cultural factors/Lutheran beliefs that may influence understanding or compliance? No       Patient demonstrates understanding of the following:  Reason for the appointment, diagnosis and treatment plan: Yes  Knowledge of proper use of medications and conditions for which they are ordered (with special attention to potential side effects or drug interactions): Yes  Which situations necessitate calling provider and whom to contact: Yes       Teaching Concerns Addressed:        Proper use and care of meds (medical equip, care aids, etc.): Yes  Nutritional needs and diet plan: Yes  Pain management techniques: Yes  Wound Care: Yes  How and/when to access community resources: Yes     Instructional Materials Used/Given: preop pkt     Time spent with patient: 15 minutes.

## 2023-12-04 ENCOUNTER — THERAPY VISIT (OUTPATIENT)
Dept: PHYSICAL THERAPY | Facility: CLINIC | Age: 53
End: 2023-12-04
Attending: ORTHOPAEDIC SURGERY
Payer: COMMERCIAL

## 2023-12-04 DIAGNOSIS — M47.12 CERVICAL SPONDYLOSIS WITH MYELOPATHY: ICD-10-CM

## 2023-12-04 DIAGNOSIS — S12.9XXS CERVICAL PSEUDOARTHROSIS, SEQUELA: ICD-10-CM

## 2023-12-04 DIAGNOSIS — M54.12 CERVICAL RADICULOPATHY: ICD-10-CM

## 2023-12-04 PROCEDURE — 97162 PT EVAL MOD COMPLEX 30 MIN: CPT | Mod: GP | Performed by: PHYSICAL THERAPIST

## 2023-12-04 PROCEDURE — 97140 MANUAL THERAPY 1/> REGIONS: CPT | Mod: GP | Performed by: PHYSICAL THERAPIST

## 2023-12-04 PROCEDURE — 97110 THERAPEUTIC EXERCISES: CPT | Mod: GP | Performed by: PHYSICAL THERAPIST

## 2023-12-04 NOTE — PROGRESS NOTES
PHYSICAL THERAPY EVALUATION  Type of Visit: Evaluation    See electronic medical record for Abuse and Falls Screening details.    Subjective       Presenting condition or subjective complaint: neck-  started in the 1980s  Pt reports pain in her neck mainly the right side and down the right arm. Has both numbness and tingling down the right arm.  Prior history of C3-C5 failed cervical fusion.  Pending upcoming cervical surgery.  Date of onset: 11/30/23    Relevant medical history: Anemia; Arthritis; Asthma; Bladder or bowel problems; Depression; High blood pressure; Osteoporosis; Overweight; Pain at night or rest; Rheumatoid arthritis; Sleep disorder like apnea   Dates & types of surgery: 2017-C3-C5 ACDF- failed, SCS in 2019 failed removed, T11-L1 Laminectomy,  Most recent 11/20/22-  PISF T8- Pelvis    Prior diagnostic imaging/testing results: CT scan; X-ray     Prior therapy history for the same diagnosis, illness or injury: Yes Many    Living Environment  Social support: With a significant other or spouse   Type of home: House; 1 level   Stairs to enter the home: Yes 3 Is there a railing: No   Ramp: No   Stairs inside the home: Yes       Help at home: None  Equipment owned: Four-point cane; Walker with wheels; Crutches     Employment: Not Applicable    Hobbies/Interests: Gardening    Patient goals for therapy:    To decrease pain, and numbness/tingling sensation down the right arm    Pain assessment:   At rest 2/10 right sided of the neck, up to 8/10  right side on the neck and down the arm     Objective   Posture: severe forward head rounded shoulders, upper cervical extension    Flexibility: tight bilateral upper trap, suboccipitals,     Cervical Movement  Response   Flexion Limited by anterior (choking sensation)- due to prior cervical fusion   Retraction Pain free, able to complete with cues   Extension Pain free cues to retract then extend   Rotation Left WFL   Rotation Right WFL   Lateral Flexion Right  Increased right sided neck pain   Lateral Flexion Left Pain free, decreases right sided neck and arm pain       Shoulder   Bilateral abduction:  limited bilaterally due to shoulder/neck pain left greater than right  Internal rotation: pain free bilaterally- limited  External rotation: limited bilaterally- painful      Palpation: pt very tender to palpation suboccipitals, bilateral upper trap    Joint mobility:  C3-C5 hypermobile, C6- T1 hypomobile     Other Tests:  Manual traction did not tolerate very minor manual traction        Assessment & Plan   CLINICAL IMPRESSIONS  Medical Diagnosis: Cervical Pseudoarthrosis    Treatment Diagnosis: Cervical Pseudoarthrosis   Impression/Assessment: Patient is a 53 year old female with cervical complaints.  The following significant findings have been identified: Pain, Decreased ROM/flexibility, Decreased joint mobility, Decreased strength, Impaired muscle performance, and Impaired posture. These impairments interfere with their ability to perform self care tasks, recreational activities, household chores, driving , household mobility, and community mobility as compared to previous level of function.     Clinical Decision Making (Complexity):  Clinical Presentation: Evolving/Changing  Clinical Presentation Rationale: based on medical and personal factors listed in PT evaluation  Clinical Decision Making (Complexity): Moderate complexity    PLAN OF CARE  Treatment Interventions:  Interventions: Manual Therapy, Neuromuscular Re-education, Therapeutic Activity, Therapeutic Exercise, Self-Care/Home Management    Long Term Goals     PT Goal 1  Goal Identifier: Pain  Goal Description: Patient will report pain levels reaching no higher than 3-4/10  Rationale: to maximize safety and independence with performance of ADLs and functional tasks;to maximize safety and independence within the home;to maximize safety and independence within the community;to maximize safety and independence  with transportation;to maximize safety and independence with self cares  Target Date: 01/15/24      Frequency of Treatment: 1/week 6 weeks  Duration of Treatment: 1/15/24    Education Assessment:   Learner/Method: No Barriers to Learning  Education Comments: no concerns    Risks and benefits of evaluation/treatment have been explained.   Patient/Family/caregiver agrees with Plan of Care.     Evaluation Time:     PT Eval, Moderate Complexity Minutes (11696): 18     Signing Clinician: Doreen RICHTER [facility]                                                                                   OUTPATIENT PHYSICAL THERAPY      PLAN OF TREATMENT FOR OUTPATIENT REHABILITATION   Patient's Last Name, First Name, Zayra Stern YOB: 1970   Provider's Name   [facility]   Medical Record No.  [id number]     Onset Date: 11/30/23  Start of Care Date: 12/04/23     Medical Diagnosis:  Cervical Pseudoarthrosis      PT Treatment Diagnosis:  Cervical Pseudoarthrosis Plan of Treatment  Frequency/Duration: 1/week 6 weeks/ 1/15/24    Certification date from 12/04/23 to 01/15/24         See note for plan of treatment details and functional goals     Doreen Benedict                         I CERTIFY THE NEED FOR THESE SERVICES FURNISHED UNDER        THIS PLAN OF TREATMENT AND WHILE UNDER MY CARE     (Physician attestation of this document indicates review and certification of the therapy plan).              Referring Provider:  Philip Wilhelm    Initial Assessment  See Epic Evaluation- Start of Care Date: 12/04/23

## 2023-12-05 ENCOUNTER — TELEPHONE (OUTPATIENT)
Dept: ORTHOPEDICS | Facility: CLINIC | Age: 53
End: 2023-12-05
Payer: COMMERCIAL

## 2023-12-05 NOTE — TELEPHONE ENCOUNTER
Patient is scheduled for surgery with Dr. Wilhelm    Spoke with: Zayra    Date of Surgery: 2/12/24    Location: [facility]    Informed patient they will need an adult  Yes    Pre op with Provider PAC, 1/16/24 at 11:30AM    Additional imaging/appointments: POP Made    Surgery packet: Received     Additional comments: N/A        Nuvia Shipman on 12/5/2023 at 2:14 PM

## 2023-12-06 NOTE — TELEPHONE ENCOUNTER
FUTURE VISIT INFORMATION      SURGERY INFORMATION:  Date: 24  [facility]  Surgeon:  Philip Wilhelm MD   Anesthesia Type:  general  Procedure: Posterior instrumented spinal fusion cervical 2-5; laminectomies cervical 3-4; use of local autograft and crushed cancellous allograft.     RECORDS REQUESTED FROM:       Primary Care Provider: Neena Tam APRN CNP - [facility]    Pertinent Medical History: hypertension    Most recent EKG+ Tracin21

## 2023-12-11 ENCOUNTER — THERAPY VISIT (OUTPATIENT)
Dept: PHYSICAL THERAPY | Facility: CLINIC | Age: 53
End: 2023-12-11
Attending: ORTHOPAEDIC SURGERY
Payer: COMMERCIAL

## 2023-12-11 DIAGNOSIS — S12.9XXS CERVICAL PSEUDOARTHROSIS, SEQUELA: Primary | ICD-10-CM

## 2023-12-11 PROCEDURE — 97140 MANUAL THERAPY 1/> REGIONS: CPT | Mod: GP | Performed by: PHYSICAL THERAPIST

## 2023-12-11 PROCEDURE — 97110 THERAPEUTIC EXERCISES: CPT | Mod: GP | Performed by: PHYSICAL THERAPIST

## 2023-12-17 ENCOUNTER — MYC MEDICAL ADVICE (OUTPATIENT)
Dept: FAMILY MEDICINE | Facility: CLINIC | Age: 53
End: 2023-12-17
Payer: COMMERCIAL

## 2023-12-17 DIAGNOSIS — Z98.890 HISTORY OF PELVIC SURGERY: ICD-10-CM

## 2023-12-17 DIAGNOSIS — E66.811 CLASS 1 OBESITY WITH SERIOUS COMORBIDITY AND BODY MASS INDEX (BMI) OF 32.0 TO 32.9 IN ADULT, UNSPECIFIED OBESITY TYPE: Primary | ICD-10-CM

## 2023-12-17 PROCEDURE — 99213 OFFICE O/P EST LOW 20 MIN: CPT | Performed by: FAMILY MEDICINE

## 2023-12-18 ENCOUNTER — THERAPY VISIT (OUTPATIENT)
Dept: PHYSICAL THERAPY | Facility: CLINIC | Age: 53
End: 2023-12-18
Attending: ORTHOPAEDIC SURGERY
Payer: COMMERCIAL

## 2023-12-18 DIAGNOSIS — S12.9XXS CERVICAL PSEUDOARTHROSIS, SEQUELA: Primary | ICD-10-CM

## 2023-12-18 PROCEDURE — 97110 THERAPEUTIC EXERCISES: CPT | Mod: GP | Performed by: PHYSICAL THERAPIST

## 2023-12-18 PROCEDURE — 97140 MANUAL THERAPY 1/> REGIONS: CPT | Mod: GP | Performed by: PHYSICAL THERAPIST

## 2023-12-19 ENCOUNTER — TRANSFERRED RECORDS (OUTPATIENT)
Dept: HEALTH INFORMATION MANAGEMENT | Facility: CLINIC | Age: 53
End: 2023-12-19

## 2023-12-22 RX ORDER — SEMAGLUTIDE 1.7 MG/.75ML
1.7 INJECTION, SOLUTION SUBCUTANEOUS WEEKLY
Qty: 3 ML | Refills: 0 | Status: SHIPPED | OUTPATIENT
Start: 2023-12-22 | End: 2024-01-13

## 2023-12-26 ENCOUNTER — THERAPY VISIT (OUTPATIENT)
Dept: PHYSICAL THERAPY | Facility: CLINIC | Age: 53
End: 2023-12-26
Payer: COMMERCIAL

## 2023-12-26 DIAGNOSIS — S12.9XXS CERVICAL PSEUDOARTHROSIS, SEQUELA: Primary | ICD-10-CM

## 2023-12-26 PROCEDURE — 97110 THERAPEUTIC EXERCISES: CPT | Mod: GP | Performed by: PHYSICAL THERAPIST

## 2023-12-26 PROCEDURE — 97140 MANUAL THERAPY 1/> REGIONS: CPT | Mod: GP | Performed by: PHYSICAL THERAPIST

## 2023-12-26 NOTE — TELEPHONE ENCOUNTER
"Patient has not have Saxenda for 1 month due to shortages, previously on 3mg dose. Plan as listed below, unsure if changed due to patient not having Saxenda for past month.    \"- Saxenda 3 mg/day x 7-10 days.  When stable switch to wegovy as shown below:  - WEGOVY - 1.7 mg/week x 4 weeks - after 2 doses send an update via [employer].  This will be my prompt to send the final \"step\" of this schedule (with refills)  - WEGOVY - 2.4 mg/week -ongoing.\"  "

## 2023-12-26 NOTE — TELEPHONE ENCOUNTER
I understand patient has been off of the Saxenda for 1 month and is now wondering what dose of Saxenda to restart.  At this time it would be recommended for her to restart the titration unless she tolerated the medication very well and she wants to restart the 3 mg dose.  I cannot predict how it will affect her so I will leave it up to her whether she wants to lópez with restarting the highest dose (I do have some people that tolerate this but I cannot predict who those people will be) or if she would like to restart the dose escalation from the beginning.

## 2024-01-03 ENCOUNTER — VIRTUAL VISIT (OUTPATIENT)
Dept: SURGERY | Facility: CLINIC | Age: 54
End: 2024-01-03
Payer: COMMERCIAL

## 2024-01-03 DIAGNOSIS — E66.811 CLASS 1 OBESITY DUE TO EXCESS CALORIES WITHOUT SERIOUS COMORBIDITY WITH BODY MASS INDEX (BMI) OF 32.0 TO 32.9 IN ADULT: ICD-10-CM

## 2024-01-03 DIAGNOSIS — Z71.3 NUTRITIONAL COUNSELING: ICD-10-CM

## 2024-01-03 DIAGNOSIS — E74.39 GLUCOSE INTOLERANCE: Primary | ICD-10-CM

## 2024-01-03 DIAGNOSIS — E66.09 CLASS 1 OBESITY DUE TO EXCESS CALORIES WITHOUT SERIOUS COMORBIDITY WITH BODY MASS INDEX (BMI) OF 32.0 TO 32.9 IN ADULT: ICD-10-CM

## 2024-01-03 PROCEDURE — 97803 MED NUTRITION INDIV SUBSEQ: CPT | Mod: 95 | Performed by: DIETITIAN, REGISTERED

## 2024-01-03 NOTE — ADDENDUM NOTE
Addended by: JUAN PABLO DELGADO on: 1/3/2024 05:01 PM     Modules accepted: Orders, Level of Service

## 2024-01-03 NOTE — PROGRESS NOTES
Zayra Ramirez is a 53 year old who is being evaluated via a billable video visit.      How would you like to obtain your AVS? MyChart  If the video visit is dropped, the invitation should be resent by: Send to e-mail at: [email or web address]  Will anyone else be joining your video visit? No        Medical  Weight Loss Follow-Up Diet Evaluation  Assessment:  Zayra is presenting today for a follow up weight management nutrition consultation.  This patient has had an initial appointment and was referred by Dr. Hong  for MNT as treatment for Obesity.     Weight loss medication:  Saxenda-unable to get, Pharmacy out of it, may switch to Zepbound .   Pt's weight is 191 lbs  Initial weight: 219 lbs   Weight change: up 3 lbs (over the last 2 months)        9/20/2023    10:06 AM   Changes and Difficulties   I have made the following changes to my diet since my last visit: lowered carbs   With regards to my diet, I am still struggling with: enough protein   I have made the following changes to my activity/exercise since my last visit: increased walking   With regards to my activity/exercise, I am still struggling with: walking over a mile atatime     BMI: There is no height or weight on file to calculate BMI.  Ideal body weight: 54.7 kg (120 lb 9.5 oz)  Adjusted ideal body weight: 67.3 kg (148 lb 5.7 oz)    Estimated RMR (Wilson-St Jeor equation):   1466 kcals x 1.3 (light active) = 1906 kcals (for weight maintenance)     Recommended Protein Intake: 60-80 grams of protein/day  Patient Active Problem List:  Patient Active Problem List   Diagnosis    Chronic bilateral low back pain without sciatica    Facial cellulitis    Class 1 obesity with serious comorbidity and body mass index (BMI) of 32.0 to 32.9 in adult    Dental abscess    Hypoxia    Subcutaneous emphysema (H24)    Borderline personality disorder (H)    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)    Esophageal stricture    Obstruction of esophagus     HTN (hypertension)    Interstitial lung disease (H)    Moderate persistent asthma without complication    Onychomycosis    Restless leg syndrome    Seasonal allergies    Stress    Respiratory bronchiolitis interstitial lung disease (H)    Spinal epidural abscess    Lumbar spondylosis    Inflammatory arthritis    Arthralgia of temporomandibular joint    Articular disc disorder of temporomandibular joint    Derangement of temporomandibular joint    Calculus of gallbladder without cholecystitis without obstruction    Displacement of articular disc of temporomandibular joint with reduction    Myofascial pain    Oral lesion    Symptomatic cholelithiasis    Sacro-iliac pain    Degenerative lumbar spinal stenosis    Glucose intolerance    Chronic neck pain    Lumbar radiculopathy, chronic    Cervical radiculopathy    Acute pain of right shoulder    Other osteoporosis without current pathological fracture    Diaphragmatic hernia    Bipolar 2 disorder (H)    Choking sensation    Gastro-esophageal reflux disease with esophagitis    Limited opening of mandible    History of pelvic surgery    Voiding dysfunction    Pelvic floor dysfunction    Urinary hesitancy    Urinary frequency    S/P spinal fusion    Cannabis abuse, daily use       Progress on goals from last visit: Did note since being off the Saxenda, has been eating more carbohydrates, especially over the holidays.  Trying to be consistent with eating 3 meals/day.     Dietary Recall:  Breakfast: Cheese or Protein Shake with Coffee  Lunch:Nuts or Cheese   Dinner:protein with vegetables, rice or potato  Typical snacks: hard boiled eggs or carrots and celery or cheese or fruit   Beverages: water-not as good as prior, needs to be more consistent with, Diet Pop-3 times/day, Coffee   Exercise: walking the dog-1/2 mile/day; PT exercises 5 times/week      Nutrition Diagnosis:    Obesity (NC 3.3) related to overeating and poor lifestyle habits as evidenced by patient's subjective  statements and BMI of 32.8 kg/m2.    Intervention:  Food and/or nutrient delivery: balanced meals, adequate protein   Nutrition education: none   Nutrition counseling: provided support and encouragement    Monitoring/Evaluation:    Goals:  Switch out nuts for cottage cheese and lunch.   Work on increased water consumption, aiming for 64-96 oz/day.     Patient to follow up in 2 month(s) with RD      Video-Visit Details    Type of service:  Video Visit    Video Start Time (time video started): 8:25 am     Video End Time (time video stopped): 8:36 am     Originating Location (pt. Location): Home      Distant Location (provider location):  Off-site    Mode of Communication:  Video Conference via [facility]    Physician has received verbal consent for a Video Visit from the patient? Yes      Saumya Dobbins, DANIEL

## 2024-01-03 NOTE — LETTER
1/3/2024         RE: Zayra Ramirez  [address] [place] [address]        Dear Colleague,    Thank you for referring your patient, Zayra Ramirez, to the [facility] [place]. Please see a copy of my visit note below.    Zayra Ramirez is a 53 year old who is being evaluated via a billable video visit.      How would you like to obtain your AVS? MyChart  If the video visit is dropped, the invitation should be resent by: Send to e-mail at: [email or web address]  Will anyone else be joining your video visit? No        Medical  Weight Loss Follow-Up Diet Evaluation  Assessment:  Zayra is presenting today for a follow up weight management nutrition consultation.  This patient has had an initial appointment and was referred by Dr. Hong  for MNT as treatment for Obesity.     Weight loss medication:  Saxenda-unable to get, Pharmacy out of it, may switch to Zepbound .   Pt's weight is 191 lbs  Initial weight: 219 lbs   Weight change: up 3 lbs (over the last 2 months)        9/20/2023    10:06 AM   Changes and Difficulties   I have made the following changes to my diet since my last visit: lowered carbs   With regards to my diet, I am still struggling with: enough protein   I have made the following changes to my activity/exercise since my last visit: increased walking   With regards to my activity/exercise, I am still struggling with: walking over a mile atatime     BMI: There is no height or weight on file to calculate BMI.  Ideal body weight: 54.7 kg (120 lb 9.5 oz)  Adjusted ideal body weight: 67.3 kg (148 lb 5.7 oz)    Estimated RMR ([place]-St Jeor equation):   1466 kcals x 1.3 (light active) = 1906 kcals (for weight maintenance)     Recommended Protein Intake: 60-80 grams of protein/day  Patient Active Problem List:  Patient Active Problem List   Diagnosis     Chronic bilateral low back pain without sciatica     Facial cellulitis     Class 1  obesity with serious comorbidity and body mass index (BMI) of 32.0 to 32.9 in adult     Dental abscess     Hypoxia     Subcutaneous emphysema (H24)     Borderline personality disorder (H)     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)     Esophageal stricture     Obstruction of esophagus     HTN (hypertension)     Interstitial lung disease (H)     Moderate persistent asthma without complication     Onychomycosis     Restless leg syndrome     Seasonal allergies     Stress     Respiratory bronchiolitis interstitial lung disease (H)     Spinal epidural abscess     Lumbar spondylosis     Inflammatory arthritis     Arthralgia of temporomandibular joint     Articular disc disorder of temporomandibular joint     Derangement of temporomandibular joint     Calculus of gallbladder without cholecystitis without obstruction     Displacement of articular disc of temporomandibular joint with reduction     Myofascial pain     Oral lesion     Symptomatic cholelithiasis     Sacro-iliac pain     Degenerative lumbar spinal stenosis     Glucose intolerance     Chronic neck pain     Lumbar radiculopathy, chronic     Cervical radiculopathy     Acute pain of right shoulder     Other osteoporosis without current pathological fracture     Diaphragmatic hernia     Bipolar 2 disorder (H)     Choking sensation     Gastro-esophageal reflux disease with esophagitis     Limited opening of mandible     History of pelvic surgery     Voiding dysfunction     Pelvic floor dysfunction     Urinary hesitancy     Urinary frequency     S/P spinal fusion     Cannabis abuse, daily use       Progress on goals from last visit: Did note since being off the Saxenda, has been eating more carbohydrates, especially over the holidays.  Trying to be consistent with eating 3 meals/day.     Dietary Recall:  Breakfast: Cheese or Protein Shake with Coffee  Lunch:Nuts or Cheese   Dinner:protein with vegetables, rice or potato  Typical snacks: hard boiled eggs or  carrots and celery or cheese or fruit   Beverages: water-not as good as prior, needs to be more consistent with, Diet Pop-3 times/day, Coffee   Exercise: walking the dog-1/2 mile/day; PT exercises 5 times/week      Nutrition Diagnosis:    Obesity (NC 3.3) related to overeating and poor lifestyle habits as evidenced by patient's subjective statements and BMI of 32.8 kg/m2.    Intervention:  Food and/or nutrient delivery: balanced meals, adequate protein   Nutrition education: none   Nutrition counseling: provided support and encouragement    Monitoring/Evaluation:    Goals:  Switch out nuts for cottage cheese and lunch.   Work on increased water consumption, aiming for 64-96 oz/day.     Patient to follow up in 2 month(s) with DANIEL      Video-Visit Details    Type of service:  Video Visit    Video Start Time (time video started): 8:25 am     Video End Time (time video stopped): 8:36 am     Originating Location (pt. Location): Home      Distant Location (provider location):  Off-site    Mode of Communication:  Video Conference via [facility]    Physician has received verbal consent for a Video Visit from the patient? Yes      Saumya Dobbins RD          Again, thank you for allowing me to participate in the care of your patient.        Sincerely,        Saumya Dobbins RD

## 2024-01-03 NOTE — TELEPHONE ENCOUNTER
See follow-up ticket-the patient needs a follow-up within the next 1 month ideally, or asap thereafter.

## 2024-01-03 NOTE — ASSESSMENT & PLAN NOTE
Ok, I sent over zepbound.   You are due for a follow up visit please schedule.   Please look at the website zepbound.com to learn about the drug and dose escalation.  Here is the youtube video to teach you how to inject zepbound.   Please read the package insert thoroughly and message me with any concerns about the potential side effects.  I believe that you do not have a family or personal history of thyroid cancer, pancreatitis or multiple endocrine neoplasia.  These are all potential side effects of zepbound.     Zepbound pen teaching video:   https://www.[employer].com/watch?v=[email or web address]    Follow up within 1 month.

## 2024-01-04 ENCOUNTER — TELEPHONE (OUTPATIENT)
Dept: FAMILY MEDICINE | Facility: CLINIC | Age: 54
End: 2024-01-04
Payer: COMMERCIAL

## 2024-01-04 ENCOUNTER — TRANSFERRED RECORDS (OUTPATIENT)
Dept: HEALTH INFORMATION MANAGEMENT | Facility: CLINIC | Age: 54
End: 2024-01-04

## 2024-01-04 NOTE — TELEPHONE ENCOUNTER
Spoke with patient. She will be having surgery early in February. Patient requested virtual visit mid-February as she will be unable to drive for 7 weeks post-op. Scheduled 2/20/24.    Patient requested PA. PA initiated in separate encounter.

## 2024-01-04 NOTE — TELEPHONE ENCOUNTER
Prior Authorization Request  Who s requesting:  Patient  Pharmacy Name and Location: [facility] [id number]  Medication Name: tirzepatide-Weight Management (ZEPBOUND) 2.5 MG/0.5ML prefilled pen   Insurance Plan: [employer]  Insurance Member ID Number:  [id number]   CoverMyMeds Key:   Informed patient that prior authorizations can take up to 10 business days for response:   Yes  Okay to leave a detailed message: Yes     Route to pool:  TEGAN MARIE [facility]

## 2024-01-06 DIAGNOSIS — M54.2 CHRONIC NECK PAIN: ICD-10-CM

## 2024-01-06 DIAGNOSIS — G06.1 SPINAL EPIDURAL ABSCESS: ICD-10-CM

## 2024-01-06 DIAGNOSIS — G89.29 CHRONIC NECK PAIN: ICD-10-CM

## 2024-01-06 DIAGNOSIS — M54.41 CHRONIC MIDLINE LOW BACK PAIN WITH RIGHT-SIDED SCIATICA: ICD-10-CM

## 2024-01-06 DIAGNOSIS — G89.29 CHRONIC MIDLINE LOW BACK PAIN WITH RIGHT-SIDED SCIATICA: ICD-10-CM

## 2024-01-07 NOTE — TELEPHONE ENCOUNTER
cyclobenzaprine (FLEXERIL) 10 MG tablet 90 tablet 1 8/15/2023     Last Office Visit : 3/14/2023  [facility] Neena Valencia, APRN CNP     Future Office visit:  0    Routing refill request to provider for review/approval because:  Drug not on the [facility], P or [facility] refill protocol or controlled substance

## 2024-01-08 ENCOUNTER — THERAPY VISIT (OUTPATIENT)
Dept: PHYSICAL THERAPY | Facility: CLINIC | Age: 54
End: 2024-01-08
Payer: COMMERCIAL

## 2024-01-08 DIAGNOSIS — S12.9XXS CERVICAL PSEUDOARTHROSIS, SEQUELA: Primary | ICD-10-CM

## 2024-01-08 PROCEDURE — 97110 THERAPEUTIC EXERCISES: CPT | Mod: GP | Performed by: PHYSICAL THERAPIST

## 2024-01-08 PROCEDURE — 97140 MANUAL THERAPY 1/> REGIONS: CPT | Mod: GP | Performed by: PHYSICAL THERAPIST

## 2024-01-08 NOTE — TELEPHONE ENCOUNTER
Central Prior Authorization Team   Phone: [phone]    PA Initiation    Medication: ZEPBOUND 2.5 MG/0.5ML SC SOAJ  Insurance Company: [employer] - Phone [phone] Fax [phone]  Pharmacy Filling the Rx: [facility] [id number] [place], [place] - [address] [address]  Filling Pharmacy Phone: [phone]  Filling Pharmacy Fax:    Start Date: 1/8/2024

## 2024-01-09 RX ORDER — CYCLOBENZAPRINE HCL 10 MG
10 TABLET ORAL AT BEDTIME
Qty: 90 TABLET | Refills: 1 | Status: SHIPPED | OUTPATIENT
Start: 2024-01-09 | End: 2024-05-02

## 2024-01-09 NOTE — TELEPHONE ENCOUNTER
PRIOR AUTHORIZATION DENIED    Medication: ZEPBOUND 2.5 MG/0.5ML SC SOAJ  Insurance Company: [employer] - Phone [phone] Fax [phone]  Denial Date: 1/8/2024  Denial Rational:         Appeal Information: N/A    Patient Notified: No

## 2024-01-11 RX ORDER — CELECOXIB 200 MG/1
200 CAPSULE ORAL EVERY MORNING
Qty: 90 CAPSULE | Refills: 1 | Status: ON HOLD | OUTPATIENT
Start: 2024-01-11 | End: 2024-02-16

## 2024-01-11 NOTE — TELEPHONE ENCOUNTER
CELECOXIB 200 MG CAPSULE   Last Written Prescription Date:  8/23/2023  Last Fill Quantity: 90,   # refills: 1  Last Office Visit : 10/12/2023  Future Office visit:  2/29/2024    Routing refill request to provider for review/approval because:  Refer to Provider for review and refills for Pt care.     Lucie Ambriz RN  Central Triage Red Flags/Med Refills

## 2024-01-15 ENCOUNTER — THERAPY VISIT (OUTPATIENT)
Dept: PHYSICAL THERAPY | Facility: CLINIC | Age: 54
End: 2024-01-15
Payer: COMMERCIAL

## 2024-01-15 DIAGNOSIS — S12.9XXS CERVICAL PSEUDOARTHROSIS, SEQUELA: Primary | ICD-10-CM

## 2024-01-15 LAB
ABO/RH(D): NORMAL
ANTIBODY SCREEN: NEGATIVE
SPECIMEN EXPIRATION DATE: NORMAL

## 2024-01-15 PROCEDURE — 97110 THERAPEUTIC EXERCISES: CPT | Mod: GP | Performed by: PHYSICAL THERAPIST

## 2024-01-15 PROCEDURE — 97140 MANUAL THERAPY 1/> REGIONS: CPT | Mod: GP | Performed by: PHYSICAL THERAPIST

## 2024-01-15 NOTE — PROGRESS NOTES
01/15/24 0500   Appointment Info   Signing clinician's name / credentials Abiola Benedict PT, DPT   Total/Authorized Visits Eval and treat   Visits Used 6   Medical Diagnosis Cervical Pseudoarthrosis   PT Tx Diagnosis Cervical Pseudoarthrosis   Progress Note/Certification   Start of Care Date 12/04/23   Onset of illness/injury or Date of Surgery 11/30/23   Therapy Frequency 1/week 6 weeks   Predicted Duration 1/15/24   Certification date from 12/04/23   Certification date to 01/15/24   Progress Note Due Date 01/15/24   Progress Note Completed Date 12/04/23   PT Goal 1   Goal Identifier Pain   Goal Description Patient will report pain levels reaching no higher than 3-4/10   Rationale to maximize safety and independence with performance of ADLs and functional tasks;to maximize safety and independence within the home;to maximize safety and independence within the community;to maximize safety and independence with transportation;to maximize safety and independence with self cares   Goal Progress Goal met   Target Date 01/15/24   Date Met 01/15/24   Subjective Report   Subjective Report Pt reports she is doing very well, is about to go out of town,  feels good and ready for surgery.   Therapeutic Procedure/Exercise   Therapeutic Procedures: strength, endurance, ROM, flexibillity minutes (97389) 31   Ther Proc 1 Education   Ther Proc 1 - Details Established POC, discussed MARTHA/PHYS of plan, HEP frequency and activity modification   Ther Proc 2 Supine posture   Ther Proc 2 - Details dorsiflex, quad set, glute set, scap retraction, chin tuck, X10 good contril   PTRx Ther Proc 1 Cervical Retraction   PTRx Ther Proc 1 - Details X10 in seated- added overpressure- patient initiated   PTRx Ther Proc 2 Scapular Retraction Depression   PTRx Ther Proc 2 - Details X10 in seated,  cues for initial few, then strong contraction and good technique   PTRx Ther Proc 3 Upper Trapezius Stretch   PTRx Ther Proc 3 - Details very gentle- not  pushing   PTRx Ther Proc 4 Supine Pec Stretch   PTRx Ther Proc 4 - Details Supine hold X30 sec to 2 min   Skilled Intervention Cues for form and control throughout   Patient Response/Progress very good technique and form- pain free   Manual Therapy   Manual Therapy: Mobilization, MFR, MLD, friction massage minutes (96599) 10   Manual Therapy 1 Suboccipital release   Manual Therapy 1 - Details no tenderness to palpation, tight, however minimal compared to prior visits   Manual Therapy 2 Grade 1 mobilization C6-T1   Manual Therapy 2 - Details gentle moblization- for increased blood flow   Manual Therapy 3 trigger point- and first rib release   Manual Therapy 3 - Details minimal release needed today-  good tissue mobliity   Manual Therapy 4 bilateral scapular mobility   Manual Therapy 4 - Details increased mobility noted today   Skilled Intervention manual therapy to decrease pain and increase motion   Patient Response/Progress tissue has improved mobility today- minimal tender points and trigger points noted   Education   Learner/Method No Barriers to Learning   Education Comments no concerns   Plan   Home program continue with HEP   Comments   Comments went from 58% to 15% in neck disability index   Total Session Time   Timed Code Treatment Minutes 41   Total Treatment Time (sum of timed and untimed services) 41         DISCHARGE  Reason for Discharge: Patient has met all goals.    Equipment Issued: NA    Discharge Plan: Patient to continue home program.    Referring Provider:  Philip Wilhelm

## 2024-01-16 ENCOUNTER — OFFICE VISIT (OUTPATIENT)
Dept: SURGERY | Facility: CLINIC | Age: 54
End: 2024-01-16
Payer: COMMERCIAL

## 2024-01-16 ENCOUNTER — LAB (OUTPATIENT)
Dept: LAB | Facility: CLINIC | Age: 54
End: 2024-01-16
Payer: COMMERCIAL

## 2024-01-16 ENCOUNTER — ANESTHESIA EVENT (OUTPATIENT)
Dept: SURGERY | Facility: CLINIC | Age: 54
DRG: 472 | End: 2024-01-16
Payer: COMMERCIAL

## 2024-01-16 ENCOUNTER — PRE VISIT (OUTPATIENT)
Dept: SURGERY | Facility: CLINIC | Age: 54
End: 2024-01-16

## 2024-01-16 VITALS
OXYGEN SATURATION: 96 % | WEIGHT: 190 LBS | TEMPERATURE: 98.4 F | BODY MASS INDEX: 32.44 KG/M2 | SYSTOLIC BLOOD PRESSURE: 132 MMHG | DIASTOLIC BLOOD PRESSURE: 83 MMHG | RESPIRATION RATE: 16 BRPM | HEIGHT: 64 IN | HEART RATE: 65 BPM

## 2024-01-16 DIAGNOSIS — S12.9XXS CERVICAL PSEUDOARTHROSIS, SEQUELA: ICD-10-CM

## 2024-01-16 DIAGNOSIS — M54.12 CERVICAL RADICULOPATHY: ICD-10-CM

## 2024-01-16 DIAGNOSIS — Z01.818 PREOP EXAMINATION: ICD-10-CM

## 2024-01-16 DIAGNOSIS — Z01.818 PREOP EXAMINATION: Primary | ICD-10-CM

## 2024-01-16 LAB
ANION GAP SERPL CALCULATED.3IONS-SCNC: 9 MMOL/L (ref 7–15)
BUN SERPL-MCNC: 14.9 MG/DL (ref 6–20)
CALCIUM SERPL-MCNC: 8.9 MG/DL (ref 8.6–10)
CHLORIDE SERPL-SCNC: 100 MMOL/L (ref 98–107)
CREAT SERPL-MCNC: 0.75 MG/DL (ref 0.51–0.95)
DEPRECATED HCO3 PLAS-SCNC: 29 MMOL/L (ref 22–29)
EGFRCR SERPLBLD CKD-EPI 2021: >90 ML/MIN/1.73M2
ERYTHROCYTE [DISTWIDTH] IN BLOOD BY AUTOMATED COUNT: 13.2 % (ref 10–15)
GLUCOSE SERPL-MCNC: 90 MG/DL (ref 70–99)
HCT VFR BLD AUTO: 37.8 % (ref 35–47)
HGB BLD-MCNC: 12.7 G/DL (ref 11.7–15.7)
MCH RBC QN AUTO: 30.3 PG (ref 26.5–33)
MCHC RBC AUTO-ENTMCNC: 33.6 G/DL (ref 31.5–36.5)
MCV RBC AUTO: 90 FL (ref 78–100)
PLATELET # BLD AUTO: 270 10E3/UL (ref 150–450)
POTASSIUM SERPL-SCNC: 4 MMOL/L (ref 3.4–5.3)
RBC # BLD AUTO: 4.19 10E6/UL (ref 3.8–5.2)
SODIUM SERPL-SCNC: 138 MMOL/L (ref 135–145)
WBC # BLD AUTO: 5.7 10E3/UL (ref 4–11)

## 2024-01-16 PROCEDURE — 80048 BASIC METABOLIC PNL TOTAL CA: CPT | Performed by: PATHOLOGY

## 2024-01-16 PROCEDURE — 36415 COLL VENOUS BLD VENIPUNCTURE: CPT | Performed by: PATHOLOGY

## 2024-01-16 PROCEDURE — 86900 BLOOD TYPING SEROLOGIC ABO: CPT | Performed by: CLINICAL NURSE SPECIALIST

## 2024-01-16 PROCEDURE — 85027 COMPLETE CBC AUTOMATED: CPT | Performed by: PATHOLOGY

## 2024-01-16 PROCEDURE — 99204 OFFICE O/P NEW MOD 45 MIN: CPT | Performed by: CLINICAL NURSE SPECIALIST

## 2024-01-16 RX ORDER — TIOTROPIUM BROMIDE AND OLODATEROL 3.124; 2.736 UG/1; UG/1
2 SPRAY, METERED RESPIRATORY (INHALATION) EVERY MORNING
COMMUNITY

## 2024-01-16 RX ORDER — AZELASTINE 1 MG/ML
2 SPRAY, METERED NASAL 2 TIMES DAILY
COMMUNITY
Start: 2023-10-20

## 2024-01-16 ASSESSMENT — LIFESTYLE VARIABLES: TOBACCO_USE: 1

## 2024-01-16 ASSESSMENT — ENCOUNTER SYMPTOMS
DYSRHYTHMIAS: 0
SEIZURES: 0

## 2024-01-16 ASSESSMENT — PAIN SCALES - GENERAL: PAINLEVEL: MILD PAIN (2)

## 2024-01-16 NOTE — H&P
Pre-Operative H & P     CC:  Preoperative exam to assess for increased cardiopulmonary risk while undergoing surgery and anesthesia.    Date of Encounter: 1/16/2024  Primary Care Physician:  Neena Tam     Reason for visit:   Encounter Diagnoses   Name Primary?    Preop examination Yes    Cervical pseudoarthrosis, sequela     Cervical radiculopathy        HPI  Zayra Ramirez is a 53 year old female who presents for pre-operative H & P in preparation for  Procedure Information       Case: [id number] Date/Time: 02/12/24 0730    Procedure: Posterior instrumented spinal fusion cervical 2-5; laminectomies cervical 3-4; use of local autograft and crushed cancellous allograft. (Spine)    Anesthesia type: General    Diagnosis:       Cervical pseudoarthrosis, sequela [S12.9XXS]      Cervical radiculopathy [M54.12]      Cervical spondylosis with myelopathy [M47.12]    Pre-op diagnosis:       Cervical pseudoarthrosis, sequela [S12.9XXS]      Cervical radiculopathy [M54.12]      Cervical spondylosis with myelopathy [M47.12]    Location:  OR  /  OR    Providers: Philip Wilhelm MD            History is obtained from the patient and chart review    Patient who has been followed over time by Dr. Wilhelm with lumbar and cervical spine pathology. After initial evaluation it was determined that the lumbar spine should be addressed surgically first. On 11/30/22, she underwent posterior spine fusion involving 3+ levels. In follow up she reported recovering well, and that low back pain was improved with surgery. She reported continued neck pain however, 50% in neck and 50% in arms, right greater than left.     Her imaging was again reviewed showing broken C3 screws and lack of bridging bone on CT scan (2/3/23) after previous surgery, and motion demonstrated on flex-ext x-rays (2/16/23). She has spondylolisthesis C2-3. She has now been counseled for above procedures.    Her history is otherwise significant for  "HTN, ROBERT w/ CPAP w/ supplemental O2, ILD, former tobacco use, anxiety, depression, PTSD, RLS, neuropathy, borderline personality, morbid obesity, GERD, TMJ syndrome, seronegative rheumatoid arthritis, chronic immunosuppression, chronic right shoulder pain, pseudogout.        She is followed by Pulmonary, last seen 10/20/23 with notes:    \"ASSESSMENTS/ RECOMMENDATIONS  Moderate persistent asthma-  Chronic, not controlled. Chronic obstruction on PFTs. No hospitalization. Overuse of NATASHA with ongoing respiratory sx, thus, pt will benefit from maintenance inhaler. Refused ICS inhaler reporting surgeon does not want her to be on steroid of any kind although she takes LD prednisone for her RA. Complicated by occupation exposure              -Start stiolto; inhale 2 puffs once daily   -Continue albuterol as needed   -Continue singulair 10mg nightly                  2. Allergic rhinitis- chronic, not controlled. Complicating #1  -Start astelin; 2 spray in each nostril twice daily      3. Hx of tobacco abuse- quit in 2021. 03/2023 LDCT negative for lung cancer screening purposes               -Continue LDCT through lung cancer screening hub      4. ROBERT- diagnosed in 2014 with a baseline AHI of 107.2. PAP compliance and therapy report data reviewed from external unique source shows a residual AHI of 1.5. Pt is using compliantly and benefiting from therapy    -Continue CPAP usage at 11 cmw setting with oxygen at 3 L bled in. nasal mask. DME: [facility]  - Avoid critical tasks, like driving, while drowsy     Return in 3-6 months.\"      Hx of abnormal bleeding or anti-platelet use: Denies    Menstrual history: No LMP recorded (lmp unknown). Patient has had a hysterectomy.     Past Medical History  Past Medical History:   Diagnosis Date    Allergic rhinitis     Anemia     Arthritis     Asthma     copd    Cervical pseudoarthrosis, sequela     Dental abscess 08/2015    Depressive disorder     Diaphragmatic hernia 07/11/2016    " Gastroesophageal reflux disease     History of emphysema (H)     Hoarseness     Hypertension     Obstructive sleep apnea     Other chronic pain     Respiratory bronchiolitis interstitial lung disease (H)     Sleep apnea     Smoker 11/02/2015       Past Surgical History  Past Surgical History:   Procedure Laterality Date    CERVICAL FUSION      COLONOSCOPY      COLONOSCOPY N/A 02/06/2020    Procedure: COLONOSCOPY, WITH POLYPECTOMY AND BIOPSY;  Surgeon: Julian Mccullough MD;  Location:  GI    CYSTOSCOPY      ENT SURGERY      ESOPHAGOSCOPY, GASTROSCOPY, DUODENOSCOPY (EGD), COMBINED N/A 02/06/2020    Procedure: ESOPHAGOGASTRODUODENOSCOPY (EGD);  Surgeon: Julian Mccullough MD;  Location:  GI    EXCISE LESION INTRAORAL Bilateral 10/03/2018    Procedure: EXCISE LESION INTRAORAL;  Wide Local Excision Of of Left Oral Cavity Ulcer;  Surgeon: Morro Mijares MD;  Location: [facility] OR    GYN SURGERY      HC DRAIN SKIN ABSCESS SIMPLE/SINGLE  03/16/2012    Procedure:INCISION AND DRAINAGE, ABSCESS, SIMPLE; Surgeon:CHRISTIANO HANCOCK; Location: GI    HEAD & NECK SURGERY      HYSTERECTOMY      HYSTERECTOMY      INCISION AND DRAINAGE ABDOMEN WASHOUT, COMBINED      INJECT EPIDURAL CERVICAL N/A 10/14/2021    Procedure: Cervical 7- Thoracic 1 epidural steroid injection with fluoroscopy;  Surgeon: Tram Florian MD;  Location: [facility] OR    INJECT EPIDURAL LUMBAR Right 09/15/2020    Procedure: Lumbar5- sacral 1 epidural steroid injection with fluoroscopy;  Surgeon: Tram Florian MD;  [facility]    INJECT EPIDURAL LUMBAR Right 06/29/2021    Procedure: Lumbar 5 sacral 1 epidural steroid injection with fluoroscopy;  Surgeon: Tram Florian MD;  Location: [facility] OR    INJECT SACROILIAC JOINT Bilateral 06/16/2020    Procedure: Bilateral sacroiliac joint steroid injection with fluoroscopy;  Surgeon: Tram Florian MD;  [facility]    LAMINECTOMY THORACIC ONE LEVEL N/A 08/19/2019    Procedure: LAMINECTOMY, SPINE,  THORACIC, 11-12 and Part of Lumbar 1, DRAINAGE OF EPIDURAL ABCESS, Epidural Drain Placement X 2;  Surgeon: Yadiel Beal MD;  [facility]    ZEFR TRACKING SYSTEM FUSION SPINE POSTERIOR LUMBAR THREE+ LEVELS N/A 11/30/2022    Procedure: Posterior Instrumented Spinal Fusion Thoracic 8 to Sacrum with Bilateral Pelvic Fixation; Transforaminal Lumbar Interbody Fusion with Erickson-Nixon Osteotomy Lumbar 1 to Sacral 1 (5 levels); Use of Infuse Bone Morphogenetic Protein Large Kit and Allograft;  Surgeon: Philip Wilhelm MD;  Location:  OR    ORTHOPEDIC SURGERY      ND PERCUT IMPLNT NEUROELECT,EPIDURAL N/A 08/08/2019    Procedure: TRIAL, SPINAL CORD STIMULATOR WITH [employer];  Surgeon: Sipple, Daniel Peter, DO;  Location: [facility];  Service: Pain    RADIO FREQUENCY ABLATION / DESTRUCTION OF SACROILOAC JOINT DORSAL PRIMARY RAMUS Bilateral 12/17/2019    Procedure: Bilateral lumbar radiofrequency ablation with fluoroscopy and intravenous sedation ( Lumbar 2,3,4,5 medial branch nerves for the bilateral lumbar3-4, 4-5 and 5-sacral1 joints.;  Surgeon: Tram Florian MD;  Location:  OR    RADIO FREQUENCY ABLATION / DESTRUCTION OF SACROILOAC JOINT DORSAL PRIMARY RAMUS Right 11/17/2020    Procedure: Right lumbar medial branch nerve radiofrequency ablation right L2,3,4,5 nerves supplying the right L3-4, L4-5 and L5-S1 facet joints;  Surgeon: Tram Florian MD;  Location: [facility] OR    spinal cord stimulator  08/08/2019    spinal cord stimulator removal  08/13/2019       Prior to Admission Medications  Current Outpatient Medications   Medication Sig Dispense Refill    albuterol (PROAIR HFA/PROVENTIL HFA/VENTOLIN HFA) 108 (90 Base) MCG/ACT inhaler Inhale 2 puffs into the lungs every 6 hours as needed for shortness of breath / dyspnea or wheezing Ventolin please 18 g 2    albuterol (PROVENTIL) (2.5 MG/3ML) 0.083% neb solution INHALE 3 ML VIA A NEBULIZER 4 TIMES DAILY IF NEEDED.       ARIPiprazole (ABILIFY) 5 MG tablet Take 5 mg by mouth every morning      azelastine (ASTELIN) 0.1 % nasal spray Spray 2 sprays in nostril 2 times daily      busPIRone HCl (BUSPAR) 30 MG tablet Take 30 mg by mouth 2 times daily       carboxymethylcellulose (CARBOXYMETHYLCELLULOSE SODIUM) 0.5 % SOLN ophthalmic solution Place 1 drop into both eyes 3 times daily as needed for dry eyes 15 mL 11    celecoxib (CELEBREX) 200 MG capsule Take 1 capsule (200 mg) by mouth every morning 90 capsule 1    cloNIDine (CATAPRES) 0.1 MG tablet Take 1 tablet by mouth as needed      cyclobenzaprine (FLEXERIL) 10 MG tablet TAKE 1 TABLET BY MOUTH EVERYDAY AT BEDTIME 90 tablet 1    DULoxetine (CYMBALTA) 60 MG capsule Take 120 mg by mouth At Bedtime       EPINEPHrine (ANY BX GENERIC EQUIV) 0.3 MG/0.3ML injection 2-pack Inject 0.3 mLs (0.3 mg) into the muscle as needed for anaphylaxis May repeat one time in 5-15 minutes if response to initial dose is inadequate. 2 each 1    ferrous fumarate 65 mg, [place]. FE,-Vitamin C 125 mg (VITRON C)  MG TABS tablet Take 1 tablet by mouth daily before breakfast      folic acid (FOLVITE) 1 MG tablet Take 1 tablet (1 mg) by mouth daily (Patient taking differently: Take 1 mg by mouth every evening) 90 tablet 1    hydroxychloroquine (PLAQUENIL) 200 MG tablet Take 1 tablet (200 mg) by mouth 2 times daily 60 tablet 7    lisinopril-hydrochlorothiazide (ZESTORETIC) 20-25 MG tablet Take 0.5 tablets by mouth daily (Patient taking differently: Take 0.5 tablets by mouth every morning) 90 tablet 3    montelukast (SINGULAIR) 10 MG tablet Take 1 tablet by mouth At Bedtime      omeprazole (PRILOSEC) 40 MG DR capsule TAKE 1 CAPSULE BY MOUTH EVERY DAY (Patient taking differently: Take 40 mg by mouth every evening) 90 capsule 3    OXYGEN-HELIUM IN 3L @ night    Oxygen only not helium      pregabalin (LYRICA) 150 MG capsule Take 1 capsule by mouth in the morning and around 1 PM (Patient taking differently: Take 150 mg by  mouth 3 times daily Take 1 capsule by mouth in the morning and around 1 PM) 60 capsule 3    rOPINIRole (REQUIP) 0.5 MG tablet Take 1 tablet (0.5 mg) by mouth At Bedtime 90 tablet 1    STIOLTO RESPIMAT 2.5-2.5 MCG/ACT AERS Inhale 2 puffs into the lungs every morning      UNABLE TO FIND CPAP machine for home use at pressure: 11 cms water with oxygen at 3 lpm , nasal mask x1/3month with nasal cushion x2/mo Length of Need: 99 months, Frequency of use: Daily      vitamin D3 (CHOLECALCIFEROL) 250 mcg (49703 units) capsule Take 1 capsule by mouth every morning      vitamin E (TOCOPHEROL) 1000 units (450 mg) CAPS capsule Take 1,000 Units by mouth every morning      Black Pepper-Turmeric (TURMERIC CURCUMIN) 5-1000 MG CAPS Take 1 capsule by mouth daily (Patient not taking: Reported on 1/16/2024)      insulin pen needle (32G X 4 MM) 32G X 4 MM miscellaneous Use 1 NEEDLE WEEKLY 12 each 3    liraglutide - Weight Management (SAXENDA) 18 MG/3ML pen INJECT 3 MG SUBCUTANEOUS DAILY FOR 90 DAYS (Patient not taking: Reported on 1/16/2024) 45 mL 0    methotrexate sodium 2.5 MG TABS  (Patient not taking: Reported on 1/16/2024)      Respiratory Therapy Supplies ([facility] CPAP FILTER) MISC       sulfamethoxazole-trimethoprim (BACTRIM DS) 800-160 MG tablet TAKE 1 TABLET BY MOUTH EVERY DAY AS ONE DOSE (Patient not taking: Reported on 1/16/2024)      tirzepatide-Weight Management (ZEPBOUND) 2.5 MG/0.5ML prefilled pen Inject 0.5 mLs (2.5 mg) Subcutaneous every 7 days (Patient not taking: Reported on 1/16/2024) 2 mL 0       Allergies  Allergies   Allergen Reactions    Bee Venom Anaphylaxis    Doxycycline Anaphylaxis     Patient thinks it may have been just nausea and vomiting, however unable to confirm  Other reaction(s): throat closes    Erythromycin      Other reaction(s): Vomiting      Hydrocodone-Acetaminophen Itching       Social History  Social History     Socioeconomic History    Marital status: Single     Spouse name: Not on  file    Number of children: Not on file    Years of education: Not on file    Highest education level: Not on file   Occupational History    Not on file   Tobacco Use    Smoking status: Former     Packs/day: 1.50     Years: 35.00     Additional pack years: 0.00     Total pack years: 52.50     Types: Cigarettes     Start date: 1996     Quit date: 2021     Years since quittin.1    Smokeless tobacco: Former     Quit date: 2021   Vaping Use    Vaping Use: Former   Substance and Sexual Activity    Alcohol use: Never    Drug use: Yes     Types: Marijuana     Comment: smoke 2x times a week    Sexual activity: Not Currently     Partners: Male     Birth control/protection: Abstinence   Other Topics Concern    Parent/sibling w/ CABG, MI or angioplasty before 65F 55M? No   Social History Narrative    Not on file     Social Determinants of Health     Financial Resource Strain: High Risk (2023)    Financial Resource Strain     Within the past 12 months, have you or your family members you live with been unable to get utilities (heat, electricity) when it was really needed?: Yes   Food Insecurity: High Risk (2023)    Food Insecurity     Within the past 12 months, did you worry that your food would run out before you got money to buy more?: No     Within the past 12 months, did the food you bought just not last and you didn t have money to get more?: Yes   Transportation Needs: Low Risk  (2023)    Transportation Needs     Within the past 12 months, has lack of transportation kept you from medical appointments, getting your medicines, non-medical meetings or appointments, work, or from getting things that you need?: No   Physical Activity: Not on file   Stress: Not on file   Social Connections: Not on file   Interpersonal Safety: Not on file   Housing Stability: Low Risk  (2023)    Housing Stability     Do you have housing? : Yes     Are you worried about losing your housing?: No       Family  History  Family History   Problem Relation Age of Onset    Asthma Mother     Restless Leg Syndrome Mother     Other Cancer Father         base of tongue cancer at age ~65    Hypertension Father     Back Pain Father     Restless Leg Syndrome Father     Coronary Artery Disease Father     Diabetes Father     Depression Father     Anxiety Disorder Father     Osteoporosis Father     Restless Leg Syndrome Sister     Breast Cancer Maternal Aunt 55    Anesthesia Reaction No family hx of     Deep Vein Thrombosis (DVT) No family hx of     Thyroid Cancer No family hx of     Multiple endocrine neoplasia No family hx of        Review of Systems  The complete review of systems is negative other than noted in the HPI or here.   Anesthesia Evaluation   Pt has had prior anesthetic. Type: General and MAC.    History of anesthetic complications   Slow to wake, ROBERT.    ROS/MED HX  ENT/Pulmonary: Comment: Uses 3L O2 with CPAP at night    Uses albuterol daily (currently 4x/day). Frequency changes w/ seasons.    Followed by Jennifer Bonilla MD in pulmonology at [facility]. She was last seen on 10/20/23. Stiolto inhaler and azelastine nasal spray added    (+) sleep apnea, uses CPAP,              tobacco use, Past use,    Moderate Persistent, asthma Last exacerbation: None recent,  Treatment: Inhaler daily, Nebulizer daily and Inhaled steroids,              (-) recent URI   Neurologic: Comment: RLS    (+)    peripheral neuropathy, - LEs, radiculopathy.                        (-) no seizures and no CVA   Cardiovascular:     (+)  hypertension- -   -  - -                                 No previous cardiac testing  (-) taking anticoagulants/antiplatelets, BLANK and arrhythmias   METS/Exercise Tolerance: 4 - Raking leaves, gardening Comment: Has been walking 1/3-1/2 miles since recovery from lumbar surgery   Hematologic:  - neg hematologic  ROS  (-) history of blood clots and history of blood transfusion   Musculoskeletal: Comment: Flatback syndrome of  "thoracolumbar region; Lumbar spondylosis; Spinal stenosis of lumbar region with neurogenic claudication; Lumbar radiculopathy.    S/p ACDF with plate fixation C3-C5 in 2017    S/p Laminectomies T11-L1 in 2019    Right shoulder pseudogout    Rheumatoid arthritis, followed by Dr. Saenz.        GI/Hepatic:     (+) GERD, Asymptomatic on medication,               (-) liver disease   Renal/Genitourinary:  - neg Renal ROS  (-) renal disease   Endo:     (+)               Obesity,    (-) Type II DM   Psychiatric/Substance Use: Comment: Borderline personality    PTSD      (+) psychiatric history anxiety, depression and bipolar       Infectious Disease:  - neg infectious disease ROS     Malignancy:  - neg malignancy ROS     Other:  - neg other ROS    (+)  , H/O Chronic Pain,         /83 (BP Location: Right arm, Patient Position: Sitting, Cuff Size: Adult Regular)   Pulse 65   Temp 98.4  F (36.9  C) (Oral)   Resp 16   Ht 1.626 m (5' 4\")   Wt 86.2 kg (190 lb)   LMP  (LMP Unknown)   SpO2 96%   Breastfeeding No   BMI 32.61 kg/m      Physical Exam   Constitutional: Awake, alert, cooperative, no apparent distress, and appears stated age. Accompanied by family  Eyes: Pupils equal, round and reactive to light, extra ocular muscles intact, sclera clear, conjunctiva normal.  HENT: Normocephalic, oral pharynx with moist mucus membranes, good dentition. No goiter appreciated.   Respiratory: Clear to auscultation bilaterally, no crackles or wheezing. Diminished breath sounds. No cough or obvious dyspnea.  Cardiovascular: Regular rate and rhythm, normal S1 and S2, and no murmur noted.  Carotids +2, no bruits. No edema. Palpable pulses to radial  DP and PT arteries.   GI: Normal bowel sounds, soft, non-distended, non-tender, no masses palpated.  Lymph/Hematologic: No cervical lymphadenopathy and no supraclavicular lymphadenopathy.  Genitourinary:  Deferred.   Skin: Warm and dry.  No rashes at anticipated surgical site. "   Musculoskeletal: Limited ROM of neck. There is no redness, warmth, or swelling of the joints. Gross motor strength is normal.    Neurologic: Awake, alert, oriented to name, place and time. Cranial nerves II-XII are grossly intact. Gait is cautious.  Neuropsychiatric: Calm, cooperative. Normal affect.     Prior Labs/Diagnostic Studies   All labs and imaging personally reviewed     EKG: Not indicated    5/25/23 Xray cervical spine                                              IMPRESSION:  1. Postoperative changes of discectomy and anterior cervical plate and  screw fixation at C3-C5 with unchanged fracturing of the right C3  screw.  2. Stable anterolisthesis of C2 on C3 which is unchanged on flexion  and extension views.  3. Question trace retrolisthesis of C3 on C4, possibly projectional.    2/4/23 MR Cervical spine                                                     IMPRESSION:  1.  Anterior approach cervical discectomy and fusion of C3-C5.  Hardware is evaluated to better effect on separate same day cervical  spine CT.     2.  Moderate midline C2-C3 and C3-C4 spinal canal stenosis. Multilevel  mild to moderate midline C4-C5 through C6-C7 spinal canal stenosis.     3.  Multilevel high-grade neural foraminal stenosis in the C2-C3  through C6-C7 distribution as detailed above.     4.  Multilevel moderate degenerative disc disease. Multilevel facet  arthropathy is moderate or severe at multiple levels.    The patient's records and results personally reviewed by this provider.     Outside records reviewed from: Care Everywhere    LAB/DIAGNOSTIC STUDIES TODAY:    Lab Results   Component Value Date    WBC 5.7 01/16/2024    WBC 7.1 07/10/2021     Lab Results   Component Value Date    RBC 4.19 01/16/2024    RBC 3.66 07/10/2021     Lab Results   Component Value Date    HGB 12.7 01/16/2024    HGB 11.9 07/10/2021     Lab Results   Component Value Date    HCT 37.8 01/16/2024    HCT 37.8 07/10/2021     Lab Results   Component  Value Date    MCV 90 01/16/2024     07/10/2021     Lab Results   Component Value Date    MCH 30.3 01/16/2024    MCH 32.5 07/10/2021     Lab Results   Component Value Date    MCHC 33.6 01/16/2024    MCHC 31.5 07/10/2021     Lab Results   Component Value Date    RDW 13.2 01/16/2024    RDW 15.3 07/10/2021     Lab Results   Component Value Date     01/16/2024     07/10/2021     Last Comprehensive Metabolic Panel:  Lab Results   Component Value Date     01/16/2024    POTASSIUM 4.0 01/16/2024    CHLORIDE 100 01/16/2024    CO2 29 01/16/2024    ANIONGAP 9 01/16/2024    GLC 90 01/16/2024    BUN 14.9 01/16/2024    CR 0.75 01/16/2024    GFRESTIMATED >90 01/16/2024    NATALIYA 8.9 01/16/2024     Type and screen drawn     Assessment    Zayra Ramirez is a 53 year old female seen as a PAC referral for risk assessment and optimization for anesthesia.    Plan/Recommendations  Pt will be optimized for the proposed procedure.  See below for details on the assessment, risk, and preoperative recommendations    Patient reports being slow to wake. Multiple anesthesia records available    NEUROLOGY  - No history of TIA, CVA or seizure  - Chronic Pain of neck and arms. Will hold Celebrex for 3 days prior to surgery. Duloxetine at HS.   - RLS. Requip at HS. Will take Lyrica on DOS    -Post Op delirium risk factors:  High co-morbid index    ENT  - No current airway concerns.  Will need to be reassessed day of surgery.  Mallampati: I  TM: > 3  - S/P C3-5 fusion with plate, mildly limited extension ROM of neck  Upper/lower partials  TMJ with mild joint soreness. No limitation to mouth opening. No history of locking.   Flexeril for teeth grinding    CARDIAC  HYPERTENSION will hold lisinopril-hydrochlorothiazide DOS. No other cardiac history, symptoms or meds. Has now been walking 1/3 -1/2 mile daily since surgery without exertional symptoms.     - METS (Metabolic Equivalents) ~4    RCRI: 0.4% risk of serious cardiac  "events    PULMONARY    - ROBERT with CPAP. Uses 3L O2 with CPAP at night  - Moderate persistent asthma. Chronic, not controlled. Chronic obstruction on PFTs. No hospitalization. Followed by Pulmonologist with Stiolto inhaler added to regimen which has helped. Will take this inhaler on DOS. Will use albuterol inhaler and bring along. Will use nebs on DOS.   - Chronic rhinitis  Will use azelastine nasal spray on DOS.   Singulair at HS    Denies cough and shortness of breath. Reports stability and some improvement with new inhaler.            - Tobacco History    History   Smoking Status    Former    Packs/day: 1.50    Years: 35.00    Types: Cigarettes    Start date: 1/1/1996    Quit date: 11/14/2021   Smokeless Tobacco    Former    Quit date: 11/14/2021       GI: GERD, controlled with omeprazole at HS  PONV High Risk  Total Score: 3           1 AN PONV: Pt is Female    1 AN PONV: Patient is not a current smoker    1 AN PONV: Intended Post Op Opioids        /RENAL  - Baseline Creatinine  0.75    ENDOCRINE    - BMI: Estimated body mass index is 32.61 kg/m  as calculated from the following:    Height as of this encounter: 1.626 m (5' 4\").    Weight as of this encounter: 86.2 kg (190 lb).  Obesity (BMI >30)  No history of DIABETES MELLITUS   3/14/23 A1c 5.3    HEME  VTE Low Risk 0.26%            Total Score: 0      Denies personal or family history of blood clots  Denies history of blood transfusion   Type and screen drawn today  Oral iron supplement    MSK:   - S/p ACDF with plate fixation C3-C5 in 2017    - S/p Laminectomies T11-L1 in 2019    - Right shoulder pseudogout    - Rheumatoid arthritis, followed by Dr. Saenz. Methotrexate on hold. Will take plaquenil on DOS    PSYCH  - Anxiety, depression, bipolar    - Borderline personality    - PTSD    Will take Abilify and Buspar on DOS. Clonidine prn if feels \"trapped\". Denies issues surrounding anesthesia      Different anesthesia methods/types have been discussed with " the patient, but they are aware that the final plan will be decided by the assigned anesthesia provider on the date of service.    The patient is optimized for their procedure. AVS with information on surgery time/arrival time, meds and NPO status given by nursing staff. No further diagnostic testing indicated.      On the day of service:     Prep time: 13 minutes  Visit time: 15 minutes  Documentation time: 15 minutes  ------------------------------------------  Total time: 43 minutes      NICKO Edgar CNS  Preoperative Assessment Center  [facility]  Phone: [phone]  Fax: [phone]

## 2024-01-16 NOTE — PATIENT INSTRUCTIONS
Preparing for Your Surgery      Name:  Zayra Ramirez   MRN:  [id number]   :  1970   Today's Date:  2024       Arriving for surgery:  Surgery date:  24  Arrival time:  5.30AM    Please come to:     Please come to:      [facility] [address]  [address] [place]  [address]  The [facility]amp for patients and visitors is located beneath the [facility]. The parking facility entrance is at the intersection of [address] [place] [address] and [address]. Patients and visitors who self-park will receive the reduced hospital parking rate (no ticket validation needed).  [employer] parking, located at the [facility] main entrance on [address] [place] [address], is available Monday - Friday from 7 am to 3:30 pm.  Discounted parking pass options can be purchased from  attendants during business hours.  -Check in at the security desk in the [facility] ([facility], [place]) Lobby. They will direct you to the correct elevators.  -Proceed to the 3rd floor, check in at the Adult Surgery Waiting Lounge. [phone]  If you are in need of directions, a wheelchair or escort please stop at the Information Desk in the lobby.  Inform the information person that you are here for surgery; a wheelchair and escort to [address] will be provided.   An escort to the Adult Surgery Waiting Lounge will be provided.    What can I eat or drink?  -  You may eat and drink normally up to 8 hours prior to arrival time. (Until 9.30PM)  -  You may have clear liquids until 2 hours prior to arrival time. (Until 3.30AM)    Examples of clear liquids:  Water  Clear broth  Juices (apple, white grape, white cranberry  and cider) without pulp  Noncarbonated, powder based beverages  (lemonade and Cristobal-Aid)  Sodas (Sprite, 7-Up, ginger ale and seltzer)  Coffee or tea (without milk or cream)  Gatorade    -  No Alcohol or cannabis  products for at least 24 hours before surgery.     Which medicines can I take?    Hold Aspirin for 7 days before surgery.   Hold Multivitamins for 7 days before surgery. (Vitamin D and E)  Hold Supplements for 7 days before surgery. (Iron with Vitamin C, Folic Acid)  Hold Ibuprofen (Advil, Motrin) for 3 day(s) before surgery--unless otherwise directed by surgeon.  Hold Naproxen (Aleve) for 4 days before surgery.    Hold Celocoxib (Celebrex) for 3 days before surgery.    -  DO NOT take these medications the day of surgery:  Lisinopril-hydrochlorothiazide (Zestoretic).     -  PLEASE TAKE these medications the day of surgery:  Morning medications per usual except for medications listed above.  Continue to use the nasal spray as needed.  Continue to use inhaler as needed. Please bring it with you to the hospital.    How do I prepare myself?  - Please take 2 showers (one the night prior to surgery and one the morning of surgery) using Scrubcare or Hibiclens soap.    Use this soap only from the neck to your toes.     Leave the soap on your skin for one minute--then rinse thoroughly.      You may use your own shampoo and conditioner. No other hair products.   - Please remove all jewelry and body piercings.  - No lotions, deodorants or fragrance.  - No makeup or fingernail polish.   - Bring your ID and insurance card.    -If you use a CPAP machine, please bring the CPAP machine, tubing, and mask to hospital.    -If you have a Deep Brain Stimulator, Spinal Cord Stimulator, or any Neuro Stimulator device---you must bring the remote control to the hospital.      ALL PATIENTS GOING HOME THE SAME DAY OF SURGERY ARE REQUIRED TO HAVE A RESPONSIBLE ADULT TO DRIVE AND BE IN ATTENDANCE WITH THEM FOR 24 HOURS FOLLOWING SURGERY.    Covid testing policy as of 12/06/2022  Your surgeon will notify and schedule you for a COVID test if one is needed before surgery--please direct any questions or COVID symptoms to your surgeon      Questions  or Concerns:    - For any questions regarding the day of surgery or your hospital stay, please contact the Pre Admission Nursing Office at [phone].       - If you have health changes between today and your surgery, please call your surgeon.       - For questions after surgery, please call your surgeons office.           Current Visitor Guidelines    You may have 2 visitors in the pre op area.    Visiting hours: 8 a.m. to 8:30 p.m.    Patients confirmed or suspected to have symptoms of COVID 19 or flu:     No visitors allowed for adult patients.   Children (under age 18) can have 1 named visitor.     People who are sick or showing symptoms of COVID 19 or flu:    Are not allowed to visit patients--we can only make exceptions in special situations.       Please follow these guidelines for your visit:          Please maintain social distance          Masking is optional--however at times you may be asked to wear a mask for the safety of yourself and others     Clean your hands with alcohol hand . Do this when you arrive at and leave the building and patient room,    And again after you touch your mask or anything in the room.     Go directly to and from the room you are visiting.     Stay in the patient s room during your visit. Limit going to other places in the hospital as much as possible     Leave bags and jackets at home or in the car.     For everyone s health, please don t come and go during your visit. That includes for smoking   during your visit.

## 2024-02-09 ASSESSMENT — LIFESTYLE VARIABLES: TOBACCO_USE: 1

## 2024-02-09 ASSESSMENT — ENCOUNTER SYMPTOMS
SEIZURES: 0
DYSRHYTHMIAS: 0

## 2024-02-10 NOTE — ANESTHESIA PREPROCEDURE EVALUATION
Pre-Operative H & P     CC:  Preoperative exam to assess for increased cardiopulmonary risk while undergoing surgery and anesthesia.    Date of Encounter: 1/16/2024  Primary Care Physician:  Neena Tam     Reason for visit:   Encounter Diagnosis   Name Primary?    Cervical radiculopathy Yes       HPI  Zayra Ramirez is a 53 year old female who presents for pre-operative H & P in preparation for  Procedure Information       Case: [id number] Date/Time: 02/12/24 0730    Procedure: Posterior instrumented spinal fusion cervical 2-5; laminectomies cervical 3-4; use of local autograft and crushed cancellous allograft. (Spine)    Anesthesia type: General    Diagnosis:       Cervical pseudoarthrosis, sequela [S12.9XXS]      Cervical radiculopathy [M54.12]      Cervical spondylosis with myelopathy [M47.12]    Pre-op diagnosis:       Cervical pseudoarthrosis, sequela [S12.9XXS]      Cervical radiculopathy [M54.12]      Cervical spondylosis with myelopathy [M47.12]    Location:  OR  /  OR    Providers: Philip Wilhelm MD            History is obtained from the patient and chart review    Patient who has been followed over time by Dr. Wilhelm with lumbar and cervical spine pathology. After initial evaluation it was determined that the lumbar spine should be addressed surgically first. On 11/30/22, she underwent posterior spine fusion involving 3+ levels. In follow up she reported recovering well, and that low back pain was improved with surgery. She reported continued neck pain however, 50% in neck and 50% in arms, right greater than left.     Her imaging was again reviewed showing broken C3 screws and lack of bridging bone on CT scan (2/3/23) after previous surgery, and motion demonstrated on flex-ext x-rays (2/16/23). She has spondylolisthesis C2-3. She has now been counseled for above procedures.    Her history is otherwise significant for HTN, ROBERT w/ CPAP w/ supplemental O2, ILD, former tobacco use,  "anxiety, depression, PTSD, RLS, neuropathy, borderline personality, morbid obesity, GERD, TMJ syndrome, seronegative rheumatoid arthritis, chronic immunosuppression, chronic right shoulder pain, pseudogout.        She is followed by Pulmonary, last seen 10/20/23 with notes:    \"ASSESSMENTS/ RECOMMENDATIONS  Moderate persistent asthma-  Chronic, not controlled. Chronic obstruction on PFTs. No hospitalization. Overuse of NATASHA with ongoing respiratory sx, thus, pt will benefit from maintenance inhaler. Refused ICS inhaler reporting surgeon does not want her to be on steroid of any kind although she takes LD prednisone for her RA. Complicated by occupation exposure              -Start stiolto; inhale 2 puffs once daily   -Continue albuterol as needed   -Continue singulair 10mg nightly                  2. Allergic rhinitis- chronic, not controlled. Complicating #1  -Start astelin; 2 spray in each nostril twice daily      3. Hx of tobacco abuse- quit in 2021. 03/2023 LDCT negative for lung cancer screening purposes               -Continue LDCT through lung cancer screening hub      4. ROBERT- diagnosed in 2014 with a baseline AHI of 107.2. PAP compliance and therapy report data reviewed from external unique source shows a residual AHI of 1.5. Pt is using compliantly and benefiting from therapy    -Continue CPAP usage at 11 cmw setting with oxygen at 3 L bled in. nasal mask. DME: [facility]  - Avoid critical tasks, like driving, while drowsy     Return in 3-6 months.\"      Hx of abnormal bleeding or anti-platelet use: Denies    Menstrual history: No LMP recorded (lmp unknown). Patient has had a hysterectomy.     Past Medical History  Past Medical History:   Diagnosis Date    Allergic rhinitis     Anemia     Arthritis     Asthma     copd    Cervical pseudoarthrosis, sequela     Dental abscess 08/2015    Depressive disorder     Diaphragmatic hernia 07/11/2016    Gastroesophageal reflux disease     History of emphysema (H)     " Hoarseness     Hypertension     Obstructive sleep apnea     Other chronic pain     Respiratory bronchiolitis interstitial lung disease (H)     Sleep apnea     Smoker 11/02/2015       Past Surgical History  Past Surgical History:   Procedure Laterality Date    CERVICAL FUSION      COLONOSCOPY      COLONOSCOPY N/A 02/06/2020    Procedure: COLONOSCOPY, WITH POLYPECTOMY AND BIOPSY;  Surgeon: Julian Mccullough MD;  Location:  GI    CYSTOSCOPY      ENT SURGERY      ESOPHAGOSCOPY, GASTROSCOPY, DUODENOSCOPY (EGD), COMBINED N/A 02/06/2020    Procedure: ESOPHAGOGASTRODUODENOSCOPY (EGD);  Surgeon: Julian Mccullough MD;  Location:  GI    EXCISE LESION INTRAORAL Bilateral 10/03/2018    Procedure: EXCISE LESION INTRAORAL;  Wide Local Excision Of of Left Oral Cavity Ulcer;  Surgeon: Morro Mijares MD;  [facility]: [facility]    GYN SURGERY      HC DRAIN SKIN ABSCESS SIMPLE/SINGLE  03/16/2012    Procedure:INCISION AND DRAINAGE, ABSCESS, SIMPLE; Surgeon:CHRISTIANO HANCOCK; Location: GI    HEAD & NECK SURGERY      HYSTERECTOMY      HYSTERECTOMY      INCISION AND DRAINAGE ABDOMEN WASHOUT, COMBINED      INJECT EPIDURAL CERVICAL N/A 10/14/2021    Procedure: Cervical 7- Thoracic 1 epidural steroid injection with fluoroscopy;  Surgeon: Tram Florian MD;  Location: [facility] OR    INJECT EPIDURAL LUMBAR Right 09/15/2020    Procedure: Lumbar5- sacral 1 epidural steroid injection with fluoroscopy;  Surgeon: Tram Florian MD;  [facility]: [facility]    INJECT EPIDURAL LUMBAR Right 06/29/2021    Procedure: Lumbar 5 sacral 1 epidural steroid injection with fluoroscopy;  Surgeon: Tram Florian MD;  Location: [facility] OR    INJECT SACROILIAC JOINT Bilateral 06/16/2020    Procedure: Bilateral sacroiliac joint steroid injection with fluoroscopy;  Surgeon: Tram Florian MD;  Location: [facility] OR    LAMINECTOMY THORACIC ONE LEVEL N/A 08/19/2019    Procedure: LAMINECTOMY, SPINE, THORACIC, 11-12 and Part of Lumbar 1, DRAINAGE OF EPIDURAL ABCESS,  Epidural Drain Placement X 2;  Surgeon: Yadiel Beal MD;  Location:  OR    OPTICAL TRACKING SYSTEM FUSION SPINE POSTERIOR LUMBAR THREE+ LEVELS N/A 11/30/2022    Procedure: Posterior Instrumented Spinal Fusion Thoracic 8 to Sacrum with Bilateral Pelvic Fixation; Transforaminal Lumbar Interbody Fusion with Erickson-Nixon Osteotomy Lumbar 1 to Sacral 1 (5 levels); Use of Infuse Bone Morphogenetic Protein Large Kit and Allograft;  Surgeon: Philip Wilhelm MD;  Location:  OR    ORTHOPEDIC SURGERY      AR PERCUT IMPLNT NEUROELECT,EPIDURAL N/A 08/08/2019    Procedure: TRIAL, SPINAL CORD STIMULATOR WITH BOSTON Fluorofinder;  Surgeon: Sipple, Daniel Peter, DO;  Location: [facility];  Service: Pain    RADIO FREQUENCY ABLATION / DESTRUCTION OF SACROILOAC JOINT DORSAL PRIMARY RAMUS Bilateral 12/17/2019    Procedure: Bilateral lumbar radiofrequency ablation with fluoroscopy and intravenous sedation ( Lumbar 2,3,4,5 medial branch nerves for the bilateral lumbar3-4, 4-5 and 5-sacral1 joints.;  Surgeon: Tram Florian MD;  Location:  OR    RADIO FREQUENCY ABLATION / DESTRUCTION OF SACROILOAC JOINT DORSAL PRIMARY RAMUS Right 11/17/2020    Procedure: Right lumbar medial branch nerve radiofrequency ablation right L2,3,4,5 nerves supplying the right L3-4, L4-5 and L5-S1 facet joints;  Surgeon: Tram Florian MD;  Location: [facility] OR    spinal cord stimulator  08/08/2019    spinal cord stimulator removal  08/13/2019       Prior to Admission Medications  Current Outpatient Medications   Medication Sig Dispense Refill    albuterol (PROAIR HFA/PROVENTIL HFA/VENTOLIN HFA) 108 (90 Base) MCG/ACT inhaler Inhale 2 puffs into the lungs every 6 hours as needed for shortness of breath / dyspnea or wheezing Ventolin please 18 g 2    albuterol (PROVENTIL) (2.5 MG/3ML) 0.083% neb solution INHALE 3 ML VIA A NEBULIZER 4 TIMES DAILY IF NEEDED.      ARIPiprazole (ABILIFY) 5 MG tablet Take 5 mg by mouth every morning       azelastine (ASTELIN) 0.1 % nasal spray Spray 2 sprays in nostril 2 times daily      Black Pepper-Turmeric (TURMERIC CURCUMIN) 5-1000 MG CAPS Take 1 capsule by mouth daily (Patient not taking: Reported on 1/16/2024)      busPIRone HCl (BUSPAR) 30 MG tablet Take 30 mg by mouth 2 times daily       carboxymethylcellulose (CARBOXYMETHYLCELLULOSE SODIUM) 0.5 % SOLN ophthalmic solution Place 1 drop into both eyes 3 times daily as needed for dry eyes 15 mL 11    celecoxib (CELEBREX) 200 MG capsule Take 1 capsule (200 mg) by mouth every morning 90 capsule 1    cloNIDine (CATAPRES) 0.1 MG tablet Take 1 tablet by mouth as needed      cyclobenzaprine (FLEXERIL) 10 MG tablet TAKE 1 TABLET BY MOUTH EVERYDAY AT BEDTIME 90 tablet 1    DULoxetine (CYMBALTA) 60 MG capsule Take 120 mg by mouth At Bedtime       EPINEPHrine (ANY BX GENERIC EQUIV) 0.3 MG/0.3ML injection 2-pack Inject 0.3 mLs (0.3 mg) into the muscle as needed for anaphylaxis May repeat one time in 5-15 minutes if response to initial dose is inadequate. 2 each 1    ferrous fumarate 65 mg, [place]. FE,-Vitamin C 125 mg (VITRON C)  MG TABS tablet Take 1 tablet by mouth daily before breakfast      folic acid (FOLVITE) 1 MG tablet Take 1 tablet (1 mg) by mouth daily (Patient taking differently: Take 1 mg by mouth every evening) 90 tablet 1    hydroxychloroquine (PLAQUENIL) 200 MG tablet Take 1 tablet (200 mg) by mouth 2 times daily 60 tablet 7    insulin pen needle (32G X 4 MM) 32G X 4 MM miscellaneous Use 1 NEEDLE WEEKLY 12 each 3    liraglutide - Weight Management (SAXENDA) 18 MG/3ML pen INJECT 3 MG SUBCUTANEOUS DAILY FOR 90 DAYS (Patient not taking: Reported on 1/16/2024) 45 mL 0    lisinopril-hydrochlorothiazide (ZESTORETIC) 20-25 MG tablet Take 0.5 tablets by mouth daily (Patient taking differently: Take 0.5 tablets by mouth every morning) 90 tablet 3    methotrexate sodium 2.5 MG TABS  (Patient not taking: Reported on 1/16/2024)      montelukast (SINGULAIR) 10 MG  tablet Take 1 tablet by mouth At Bedtime      omeprazole (PRILOSEC) 40 MG DR capsule TAKE 1 CAPSULE BY MOUTH EVERY DAY (Patient taking differently: Take 40 mg by mouth every evening) 90 capsule 3    OXYGEN-HELIUM IN 3L @ night    Oxygen only not helium      pregabalin (LYRICA) 150 MG capsule Take 1 capsule by mouth in the morning and around 1 PM (Patient taking differently: Take 150 mg by mouth 3 times daily Take 1 capsule by mouth in the morning and around 1 PM) 60 capsule 3    Respiratory Therapy Supplies ([facility] CPAP FILTER) MISC       rOPINIRole (REQUIP) 0.5 MG tablet Take 1 tablet (0.5 mg) by mouth At Bedtime 90 tablet 1    STIOLTO RESPIMAT 2.5-2.5 MCG/ACT AERS Inhale 2 puffs into the lungs every morning      sulfamethoxazole-trimethoprim (BACTRIM DS) 800-160 MG tablet TAKE 1 TABLET BY MOUTH EVERY DAY AS ONE DOSE (Patient not taking: Reported on 1/16/2024)      tirzepatide-Weight Management (ZEPBOUND) 2.5 MG/0.5ML prefilled pen Inject 0.5 mLs (2.5 mg) Subcutaneous every 7 days (Patient not taking: Reported on 1/16/2024) 2 mL 0    UNABLE TO FIND CPAP machine for home use at pressure: 11 cms water with oxygen at 3 lpm , nasal mask x1/3month with nasal cushion x2/mo Length of Need: 99 months, Frequency of use: Daily      vitamin D3 (CHOLECALCIFEROL) 250 mcg (49324 units) capsule Take 1 capsule by mouth every morning      vitamin E (TOCOPHEROL) 1000 units (450 mg) CAPS capsule Take 1,000 Units by mouth every morning         Allergies  Allergies   Allergen Reactions    Bee Venom Anaphylaxis    Doxycycline Anaphylaxis     Patient thinks it may have been just nausea and vomiting, however unable to confirm  Other reaction(s): throat closes    Erythromycin      Other reaction(s): Vomiting      Hydrocodone-Acetaminophen Itching       Social History  Social History     Socioeconomic History    Marital status: Single     Spouse name: Not on file    Number of children: Not on file    Years of education: Not on  file    Highest education level: Not on file   Occupational History    Not on file   Tobacco Use    Smoking status: Former     Packs/day: 1.50     Years: 35.00     Additional pack years: 0.00     Total pack years: 52.50     Types: Cigarettes     Start date: 1996     Quit date: 2021     Years since quittin.2    Smokeless tobacco: Former     Quit date: 2021   Vaping Use    Vaping Use: Former   Substance and Sexual Activity    Alcohol use: Never    Drug use: Yes     Types: Marijuana     Comment: smoke 2x times a week    Sexual activity: Not Currently     Partners: Male     Birth control/protection: Abstinence   Other Topics Concern    Parent/sibling w/ CABG, MI or angioplasty before 65F 55M? No   Social History Narrative    Not on file     Social Determinants of Health     Financial Resource Strain: High Risk (2023)    Financial Resource Strain     Within the past 12 months, have you or your family members you live with been unable to get utilities (heat, electricity) when it was really needed?: Yes   Food Insecurity: High Risk (2023)    Food Insecurity     Within the past 12 months, did you worry that your food would run out before you got money to buy more?: No     Within the past 12 months, did the food you bought just not last and you didn t have money to get more?: Yes   Transportation Needs: Low Risk  (2023)    Transportation Needs     Within the past 12 months, has lack of transportation kept you from medical appointments, getting your medicines, non-medical meetings or appointments, work, or from getting things that you need?: No   Physical Activity: Not on file   Stress: Not on file   Social Connections: Not on file   Interpersonal Safety: Not on file   Housing Stability: Low Risk  (2023)    Housing Stability     Do you have housing? : Yes     Are you worried about losing your housing?: No       Family History  Family History   Problem Relation Age of Onset    Asthma  Mother     Restless Leg Syndrome Mother     Other Cancer Father         base of tongue cancer at age ~65    Hypertension Father     Back Pain Father     Restless Leg Syndrome Father     Coronary Artery Disease Father     Diabetes Father     Depression Father     Anxiety Disorder Father     Osteoporosis Father     Restless Leg Syndrome Sister     Breast Cancer Maternal Aunt 55    Anesthesia Reaction No family hx of     Deep Vein Thrombosis (DVT) No family hx of     Thyroid Cancer No family hx of     Multiple endocrine neoplasia No family hx of        Review of Systems  The complete review of systems is negative other than noted in the HPI or here.   Anesthesia Evaluation   Pt has had prior anesthetic. Type: General and MAC.    History of anesthetic complications   Slow to wake, ROBERT.    ROS/MED HX  ENT/Pulmonary: Comment: Uses 3L O2 with CPAP at night    Uses albuterol daily (currently 4x/day). Frequency changes w/ seasons.    Followed by Jennifer Bonilla MD in pulmonology at [facility]. She was last seen on 10/20/23. Stiolto inhaler and azelastine nasal spray added    (+) sleep apnea, moderate, uses CPAP,              tobacco use (quit 2021), Past use,    Moderate Persistent, asthma Last exacerbation: None recent,  Treatment: Inhaler daily, Nebulizer daily and Inhaled steroids,              (-) recent URI   Neurologic: Comment: RLS    (+)    peripheral neuropathy, - LEs, radiculopathy.                        (-) no seizures and no CVA   Cardiovascular:     (+)  hypertension- -   -  - -                                 No previous cardiac testing  (-) taking anticoagulants/antiplatelets, BLANK and arrhythmias   METS/Exercise Tolerance: 4 - Raking leaves, gardening Comment: Has been walking 1/3-1/2 miles since recovery from lumbar surgery   Hematologic:  - neg hematologic  ROS  (-) history of blood clots and history of blood transfusion   Musculoskeletal: Comment: Flatback syndrome of thoracolumbar region; Lumbar spondylosis;  Spinal stenosis of lumbar region with neurogenic claudication; Lumbar radiculopathy.    S/p ACDF with plate fixation C3-C5 in 2017    S/p Laminectomies T11-L1 in 2019    Right shoulder pseudogout    Rheumatoid arthritis, followed by Dr. aSenz.        GI/Hepatic:     (+) GERD, Asymptomatic on medication,               (-) liver disease   Renal/Genitourinary:  - neg Renal ROS  (-) renal disease   Endo:     (+)               Obesity,    (-) Type II DM   Psychiatric/Substance Use: Comment: Borderline personality    PTSD      (+) psychiatric history anxiety, depression and bipolar       Infectious Disease:  - neg infectious disease ROS     Malignancy:  - neg malignancy ROS     Other:  - neg other ROS    (+)  , H/O Chronic Pain,         LMP  (LMP Unknown)     Physical Exam   Constitutional: Awake, alert, cooperative, no apparent distress, and appears stated age. Accompanied by family  Eyes: Pupils equal, round and reactive to light, extra ocular muscles intact, sclera clear, conjunctiva normal.  HENT: Normocephalic, oral pharynx with moist mucus membranes, good dentition. No goiter appreciated.   Respiratory: Clear to auscultation bilaterally, no crackles or wheezing. Diminished breath sounds. No cough or obvious dyspnea.  Cardiovascular: Regular rate and rhythm, normal S1 and S2, and no murmur noted.  Carotids +2, no bruits. No edema. Palpable pulses to radial  DP and PT arteries.   GI: Normal bowel sounds, soft, non-distended, non-tender, no masses palpated.  Lymph/Hematologic: No cervical lymphadenopathy and no supraclavicular lymphadenopathy.  Genitourinary:  Deferred.   Skin: Warm and dry.  No rashes at anticipated surgical site.   Musculoskeletal: Limited ROM of neck. There is no redness, warmth, or swelling of the joints. Gross motor strength is normal.    Neurologic: Awake, alert, oriented to name, place and time. Cranial nerves II-XII are grossly intact. Gait is cautious.  Neuropsychiatric: Calm, cooperative.  Normal affect.     Prior Labs/Diagnostic Studies   All labs and imaging personally reviewed     EKG: Not indicated    5/25/23 Xray cervical spine                                              IMPRESSION:  1. Postoperative changes of discectomy and anterior cervical plate and  screw fixation at C3-C5 with unchanged fracturing of the right C3  screw.  2. Stable anterolisthesis of C2 on C3 which is unchanged on flexion  and extension views.  3. Question trace retrolisthesis of C3 on C4, possibly projectional.    2/4/23 MR Cervical spine                                                     IMPRESSION:  1.  Anterior approach cervical discectomy and fusion of C3-C5.  Hardware is evaluated to better effect on separate same day cervical  spine CT.     2.  Moderate midline C2-C3 and C3-C4 spinal canal stenosis. Multilevel  mild to moderate midline C4-C5 through C6-C7 spinal canal stenosis.     3.  Multilevel high-grade neural foraminal stenosis in the C2-C3  through C6-C7 distribution as detailed above.     4.  Multilevel moderate degenerative disc disease. Multilevel facet  arthropathy is moderate or severe at multiple levels.    The patient's records and results personally reviewed by this provider.     Outside records reviewed from: Care Everywhere    LAB/DIAGNOSTIC STUDIES TODAY:    Lab Results   Component Value Date    WBC 5.7 01/16/2024    WBC 7.1 07/10/2021     Lab Results   Component Value Date    RBC 4.19 01/16/2024    RBC 3.66 07/10/2021     Lab Results   Component Value Date    HGB 12.7 01/16/2024    HGB 11.9 07/10/2021     Lab Results   Component Value Date    HCT 37.8 01/16/2024    HCT 37.8 07/10/2021     Lab Results   Component Value Date    MCV 90 01/16/2024     07/10/2021     Lab Results   Component Value Date    MCH 30.3 01/16/2024    MCH 32.5 07/10/2021     Lab Results   Component Value Date    MCHC 33.6 01/16/2024    MCHC 31.5 07/10/2021     Lab Results   Component Value Date    RDW 13.2 01/16/2024     RDW 15.3 07/10/2021     Lab Results   Component Value Date     01/16/2024     07/10/2021     Last Comprehensive Metabolic Panel:  Lab Results   Component Value Date     01/16/2024    POTASSIUM 4.0 01/16/2024    CHLORIDE 100 01/16/2024    CO2 29 01/16/2024    ANIONGAP 9 01/16/2024    GLC 90 01/16/2024    BUN 14.9 01/16/2024    CR 0.75 01/16/2024    GFRESTIMATED >90 01/16/2024    NATALIYA 8.9 01/16/2024     Type and screen drawn     Assessment    Zayra Ramirez is a 53 year old female seen as a PAC referral for risk assessment and optimization for anesthesia.    Plan/Recommendations  Pt will be optimized for the proposed procedure.  See below for details on the assessment, risk, and preoperative recommendations    Patient reports being slow to wake. Multiple anesthesia records available    NEUROLOGY  - No history of TIA, CVA or seizure  - Chronic Pain of neck and arms. Will hold Celebrex for 3 days prior to surgery. Duloxetine at HS.   - RLS. Requip at HS. Will take Lyrica on DOS    -Post Op delirium risk factors:  High co-morbid index    ENT  - No current airway concerns.  Will need to be reassessed day of surgery.  Mallampati: I  TM: > 3  - S/P C3-5 fusion with plate, mildly limited extension ROM of neck  Upper/lower partials  TMJ with mild joint soreness. No limitation to mouth opening. No history of locking.   Flexeril for teeth grinding    CARDIAC  HYPERTENSION will hold lisinopril-hydrochlorothiazide DOS. No other cardiac history, symptoms or meds. Has now been walking 1/3 -1/2 mile daily since surgery without exertional symptoms.     - METS (Metabolic Equivalents) ~4    RCRI: 0.4% risk of serious cardiac events    PULMONARY    - ROBERT with CPAP. Uses 3L O2 with CPAP at night  - Moderate persistent asthma. Chronic, not controlled. Chronic obstruction on PFTs. No hospitalization. Followed by Pulmonologist with Stiolto inhaler added to regimen which has helped. Will take this inhaler on DOS. Will use  "albuterol inhaler and bring along. Will use nebs on DOS.   - Chronic rhinitis  Will use azelastine nasal spray on DOS.   Singulair at HS    Denies cough and shortness of breath. Reports stability and some improvement with new inhaler.            - Tobacco History    History   Smoking Status    Former    Packs/day: 1.50    Years: 35.00    Types: Cigarettes    Start date: 1/1/1996    Quit date: 11/14/2021   Smokeless Tobacco    Former    Quit date: 11/14/2021       GI: GERD, controlled with omeprazole at HS  PONV High Risk  Total Score: 3           1 AN PONV: Pt is Female    1 AN PONV: Patient is not a current smoker    1 AN PONV: Intended Post Op Opioids        /RENAL  - Baseline Creatinine  0.75    ENDOCRINE    - BMI: Estimated body mass index is 32.61 kg/m  as calculated from the following:    Height as of 1/16/24: 1.626 m (5' 4\").    Weight as of 1/16/24: 86.2 kg (190 lb).  Obesity (BMI >30)  No history of DIABETES MELLITUS   3/14/23 A1c 5.3    HEME  VTE Low Risk 0.26%            Total Score: 0      Denies personal or family history of blood clots  Denies history of blood transfusion   Type and screen drawn today  Oral iron supplement    MSK:   - S/p ACDF with plate fixation C3-C5 in 2017    - S/p Laminectomies T11-L1 in 2019    - Right shoulder pseudogout    - Rheumatoid arthritis, followed by Dr. Saenz. Methotrexate on hold. Will take plaquenil on DOS    PSYCH  - Anxiety, depression, bipolar    - Borderline personality    - PTSD    Will take Abilify and Buspar on DOS. Clonidine prn if feels \"trapped\". Denies issues surrounding anesthesia      Different anesthesia methods/types have been discussed with the patient, but they are aware that the final plan will be decided by the assigned anesthesia provider on the date of service.    The patient is optimized for their procedure. AVS with information on surgery time/arrival time, meds and NPO status given by nursing staff. No further diagnostic testing " indicated.      On the day of service:     Prep time: 13 minutes  Visit time: 15 minutes  Documentation time: 15 minutes  ------------------------------------------  Total time: 43 minutes      NICKO Edgar CNS  Preoperative Assessment Center  [facility]  Phone: [phone]  Fax: [phone]    Physical Exam    Airway        Mallampati: II   TM distance: > 3 FB   Neck ROM: limited   Mouth opening: > 3 cm    Respiratory Devices and Support         Dental       (+) Modest Abnormalities - crowns, retainers, 1 or 2 missing teeth    B=Bridge, C=Chipped, L=Loose, M=Missing    Cardiovascular   cardiovascular exam normal       Rhythm and rate: regular and normal     Pulmonary   pulmonary exam normal        breath sounds clear to auscultation           Anesthesia Plan    ASA Status:  3    NPO Status:  NPO Appropriate    Anesthesia Type: General.     - Airway: ETT   Induction: Intravenous, Propofol.   Maintenance: Balanced.   Techniques and Equipment:     - Airway: Video-Laryngoscope     - Lines/Monitors: 2nd IV, BIS, Arterial Line     Consents    Anesthesia Plan(s) and associated risks, benefits, and realistic alternatives discussed. Questions answered and patient/representative(s) expressed understanding.     - Discussed: Risks, Benefits and Alternatives for BOTH SEDATION and the PROCEDURE were discussed     - Discussed with:  Patient       - Patient is DNR/DNI Status: No     Use of blood products discussed: Yes.     - Discussed with: Patient.     - Consented: consented to blood products     Postoperative Care    Pain management: IV analgesics, Oral pain medications, Multi-modal analgesia.   PONV prophylaxis: Ondansetron (or other 5HT-3), Dexamethasone or Solumedrol, Background Propofol Infusion     Comments:    Other Comments: 53 yo female PMHx intersitital lung disease, moderate persistent asthma, GERD (last dose of liraglutide, terzepatide several months ago),  HTN, lumbar and cervical radiculopathy presenting for cervical 2-5 fusion (previous fusion x1 in cervical region). Plan GETA with standard ASA monitors + arterial catheter and 2nd PIV + BIS. Risks and benefits of anesthesia/procedure explained including but not limited to allergic reaction, aspiration, need for invasive airway, somnolence, delirium, vocal cord/dental trauma, nausea/vomiting, arrhythmia, stroke, bleeding, need for blood transfusion, myocardial infarction, and death.

## 2024-02-12 ENCOUNTER — ANESTHESIA (OUTPATIENT)
Dept: SURGERY | Facility: CLINIC | Age: 54
DRG: 472 | End: 2024-02-12
Payer: COMMERCIAL

## 2024-02-12 ENCOUNTER — APPOINTMENT (OUTPATIENT)
Dept: GENERAL RADIOLOGY | Facility: CLINIC | Age: 54
DRG: 472 | End: 2024-02-12
Attending: ORTHOPAEDIC SURGERY
Payer: COMMERCIAL

## 2024-02-12 ENCOUNTER — HOSPITAL ENCOUNTER (INPATIENT)
Facility: CLINIC | Age: 54
LOS: 4 days | Discharge: HOME | DRG: 472 | End: 2024-02-16
Attending: ORTHOPAEDIC SURGERY | Admitting: ORTHOPAEDIC SURGERY
Payer: COMMERCIAL

## 2024-02-12 DIAGNOSIS — E87.1 HYPONATREMIA: ICD-10-CM

## 2024-02-12 DIAGNOSIS — M54.12 CERVICAL RADICULOPATHY: Primary | ICD-10-CM

## 2024-02-12 DIAGNOSIS — Z98.1 S/P SPINAL FUSION: ICD-10-CM

## 2024-02-12 LAB — GLUCOSE BLDC GLUCOMTR-MCNC: 103 MG/DL (ref 70–99)

## 2024-02-12 PROCEDURE — 0RG2071 FUSION OF 2 OR MORE CERVICAL VERTEBRAL JOINTS WITH AUTOLOGOUS TISSUE SUBSTITUTE, POSTERIOR APPROACH, POSTERIOR COLUMN, OPEN APPROACH: ICD-10-PCS | Performed by: ORTHOPAEDIC SURGERY

## 2024-02-12 PROCEDURE — 61783 SCAN PROC SPINAL: CPT | Mod: 59 | Performed by: ORTHOPAEDIC SURGERY

## 2024-02-12 PROCEDURE — 63048 LAM FACETEC &FORAMOT EA ADDL: CPT | Performed by: ORTHOPAEDIC SURGERY

## 2024-02-12 PROCEDURE — L0174 CERV SR 2PC THOR EXT PRE OTS: HCPCS

## 2024-02-12 PROCEDURE — A7030 CPAP FULL FACE MASK: HCPCS

## 2024-02-12 PROCEDURE — 258N000003 HC RX IP 258 OP 636: Performed by: NURSE ANESTHETIST, CERTIFIED REGISTERED

## 2024-02-12 PROCEDURE — C1762 CONN TISS, HUMAN(INC FASCIA): HCPCS | Performed by: ORTHOPAEDIC SURGERY

## 2024-02-12 PROCEDURE — 120N000002 HC R&B MED SURG/OB UMMC

## 2024-02-12 PROCEDURE — 250N000013 HC RX MED GY IP 250 OP 250 PS 637: Performed by: PHYSICIAN ASSISTANT

## 2024-02-12 PROCEDURE — 999N000157 HC STATISTIC RCP TIME EA 10 MIN

## 2024-02-12 PROCEDURE — 22614 ARTHRD PST TQ 1NTRSPC EA ADD: CPT | Performed by: ORTHOPAEDIC SURGERY

## 2024-02-12 PROCEDURE — 250N000011 HC RX IP 250 OP 636: Performed by: NURSE ANESTHETIST, CERTIFIED REGISTERED

## 2024-02-12 PROCEDURE — 360N000086 HC SURGERY LEVEL 6 W/ FLUORO, PER MIN: Performed by: ORTHOPAEDIC SURGERY

## 2024-02-12 PROCEDURE — 250N000009 HC RX 250: Performed by: PHYSICIAN ASSISTANT

## 2024-02-12 PROCEDURE — 999N000141 HC STATISTIC PRE-PROCEDURE NURSING ASSESSMENT: Performed by: ORTHOPAEDIC SURGERY

## 2024-02-12 PROCEDURE — 370N000017 HC ANESTHESIA TECHNICAL FEE, PER MIN: Performed by: ORTHOPAEDIC SURGERY

## 2024-02-12 PROCEDURE — 710N000010 HC RECOVERY PHASE 1, LEVEL 2, PER MIN: Performed by: ORTHOPAEDIC SURGERY

## 2024-02-12 PROCEDURE — A7035 POS AIRWAY PRESS HEADGEAR: HCPCS

## 2024-02-12 PROCEDURE — 272N000001 HC OR GENERAL SUPPLY STERILE: Performed by: ORTHOPAEDIC SURGERY

## 2024-02-12 PROCEDURE — 250N000013 HC RX MED GY IP 250 OP 250 PS 637: Performed by: NURSE ANESTHETIST, CERTIFIED REGISTERED

## 2024-02-12 PROCEDURE — 250N000009 HC RX 250: Performed by: NURSE ANESTHETIST, CERTIFIED REGISTERED

## 2024-02-12 PROCEDURE — 250N000025 HC SEVOFLURANE, PER MIN: Performed by: ORTHOPAEDIC SURGERY

## 2024-02-12 PROCEDURE — 22600 ARTHRD PST TQ 1NTRSPC CRV: CPT | Mod: 22 | Performed by: ORTHOPAEDIC SURGERY

## 2024-02-12 PROCEDURE — 250N000011 HC RX IP 250 OP 636: Performed by: ORTHOPAEDIC SURGERY

## 2024-02-12 PROCEDURE — L0172 CERV COL SR FOAM 2PC PRE OTS: HCPCS

## 2024-02-12 PROCEDURE — 01N10ZZ RELEASE CERVICAL NERVE, OPEN APPROACH: ICD-10-PCS | Performed by: ORTHOPAEDIC SURGERY

## 2024-02-12 PROCEDURE — C1713 ANCHOR/SCREW BN/BN,TIS/BN: HCPCS | Performed by: ORTHOPAEDIC SURGERY

## 2024-02-12 PROCEDURE — 250N000013 HC RX MED GY IP 250 OP 250 PS 637: Performed by: ORTHOPAEDIC SURGERY

## 2024-02-12 PROCEDURE — 999N000179 XR SURGERY CARM FLUORO LESS THAN 5 MIN W STILLS

## 2024-02-12 PROCEDURE — 258N000003 HC RX IP 258 OP 636: Performed by: ORTHOPAEDIC SURGERY

## 2024-02-12 PROCEDURE — 250N000009 HC RX 250: Performed by: ORTHOPAEDIC SURGERY

## 2024-02-12 PROCEDURE — 250N000013 HC RX MED GY IP 250 OP 250 PS 637: Performed by: STUDENT IN AN ORGANIZED HEALTH CARE EDUCATION/TRAINING PROGRAM

## 2024-02-12 PROCEDURE — 20930 SP BONE ALGRFT MORSEL ADD-ON: CPT | Performed by: ORTHOPAEDIC SURGERY

## 2024-02-12 PROCEDURE — 250N000011 HC RX IP 250 OP 636: Performed by: STUDENT IN AN ORGANIZED HEALTH CARE EDUCATION/TRAINING PROGRAM

## 2024-02-12 PROCEDURE — 250N000011 HC RX IP 250 OP 636: Mod: JZ | Performed by: STUDENT IN AN ORGANIZED HEALTH CARE EDUCATION/TRAINING PROGRAM

## 2024-02-12 PROCEDURE — 22842 INSERT SPINE FIXATION DEVICE: CPT | Performed by: ORTHOPAEDIC SURGERY

## 2024-02-12 PROCEDURE — 63045 LAM FACETEC & FORAMOT CRV: CPT | Mod: 22 | Performed by: ORTHOPAEDIC SURGERY

## 2024-02-12 PROCEDURE — 20936 SP BONE AGRFT LOCAL ADD-ON: CPT | Performed by: ORTHOPAEDIC SURGERY

## 2024-02-12 PROCEDURE — 5A09357 ASSISTANCE WITH RESPIRATORY VENTILATION, LESS THAN 24 CONSECUTIVE HOURS, CONTINUOUS POSITIVE AIRWAY PRESSURE: ICD-10-PCS | Performed by: ORTHOPAEDIC SURGERY

## 2024-02-12 PROCEDURE — 258N000003 HC RX IP 258 OP 636: Performed by: PHYSICIAN ASSISTANT

## 2024-02-12 PROCEDURE — 99254 IP/OBS CNSLTJ NEW/EST MOD 60: CPT | Performed by: PHYSICIAN ASSISTANT

## 2024-02-12 PROCEDURE — 250N000011 HC RX IP 250 OP 636: Performed by: PHYSICIAN ASSISTANT

## 2024-02-12 PROCEDURE — 8E09XBF COMPUTER ASSISTED PROCEDURE OF HEAD AND NECK REGION, WITH FLUOROSCOPY: ICD-10-PCS | Performed by: ORTHOPAEDIC SURGERY

## 2024-02-12 DEVICE — IMP SCREW INFINITY SPINE MULTIAXIAL14MMX3.5MM 3603514: Type: IMPLANTABLE DEVICE | Site: POSTERIOR CERVICAL | Status: FUNCTIONAL

## 2024-02-12 DEVICE — IMP SCREW INFINITY SPINE MULTIAXIAL 16MMX3.5MM 3603516: Type: IMPLANTABLE DEVICE | Site: POSTERIOR CERVICAL | Status: FUNCTIONAL

## 2024-02-12 DEVICE — IMP SET SCREW MEDTRONIC INFINITY 3600215: Type: IMPLANTABLE DEVICE | Site: POSTERIOR CERVICAL | Status: FUNCTIONAL

## 2024-02-12 DEVICE — ROD SPNL 50MM 3.5MM PCUT: Type: IMPLANTABLE DEVICE | Site: POSTERIOR CERVICAL | Status: FUNCTIONAL

## 2024-02-12 DEVICE — IMP SCREW INFINITY SPINE MULTIAXIAL 18MMX3.5MM 3603518: Type: IMPLANTABLE DEVICE | Site: POSTERIOR CERVICAL | Status: FUNCTIONAL

## 2024-02-12 DEVICE — SCREW BN 28MM 4MM MA NS INFNT SPNE LF: Type: IMPLANTABLE DEVICE | Site: POSTERIOR CERVICAL | Status: FUNCTIONAL

## 2024-02-12 DEVICE — GRAFT BN CANC 15CC CRSH 1-10MM 800103: Type: IMPLANTABLE DEVICE | Status: FUNCTIONAL

## 2024-02-12 RX ORDER — DULOXETIN HYDROCHLORIDE 60 MG/1
120 CAPSULE, DELAYED RELEASE ORAL AT BEDTIME
Status: DISCONTINUED | OUTPATIENT
Start: 2024-02-12 | End: 2024-02-16 | Stop reason: HOSPADM

## 2024-02-12 RX ORDER — BISACODYL 10 MG
10 SUPPOSITORY, RECTAL RECTAL DAILY PRN
Status: DISCONTINUED | OUTPATIENT
Start: 2024-02-12 | End: 2024-02-16 | Stop reason: HOSPADM

## 2024-02-12 RX ORDER — AMOXICILLIN 250 MG
1 CAPSULE ORAL 2 TIMES DAILY
Status: DISCONTINUED | OUTPATIENT
Start: 2024-02-12 | End: 2024-02-16 | Stop reason: HOSPADM

## 2024-02-12 RX ORDER — ACETAMINOPHEN 325 MG/1
975 TABLET ORAL ONCE
Status: COMPLETED | OUTPATIENT
Start: 2024-02-12 | End: 2024-02-12

## 2024-02-12 RX ORDER — ONDANSETRON 2 MG/ML
4 INJECTION INTRAMUSCULAR; INTRAVENOUS EVERY 6 HOURS PRN
Status: DISCONTINUED | OUTPATIENT
Start: 2024-02-12 | End: 2024-02-16 | Stop reason: HOSPADM

## 2024-02-12 RX ORDER — HYDROXYCHLOROQUINE SULFATE 200 MG/1
200 TABLET, FILM COATED ORAL 2 TIMES DAILY
Status: DISCONTINUED | OUTPATIENT
Start: 2024-02-12 | End: 2024-02-16 | Stop reason: HOSPADM

## 2024-02-12 RX ORDER — SODIUM CHLORIDE, SODIUM LACTATE, POTASSIUM CHLORIDE, CALCIUM CHLORIDE 600; 310; 30; 20 MG/100ML; MG/100ML; MG/100ML; MG/100ML
INJECTION, SOLUTION INTRAVENOUS CONTINUOUS PRN
Status: DISCONTINUED | OUTPATIENT
Start: 2024-02-12 | End: 2024-02-12

## 2024-02-12 RX ORDER — KETOROLAC TROMETHAMINE 30 MG/ML
15 INJECTION, SOLUTION INTRAMUSCULAR; INTRAVENOUS
Status: COMPLETED | OUTPATIENT
Start: 2024-02-12 | End: 2024-02-12

## 2024-02-12 RX ORDER — PREGABALIN 75 MG/1
150 CAPSULE ORAL EVERY 24 HOURS
Status: DISCONTINUED | OUTPATIENT
Start: 2024-02-12 | End: 2024-02-15

## 2024-02-12 RX ORDER — LIDOCAINE HYDROCHLORIDE ANHYDROUS AND DEXTROSE MONOHYDRATE .8; 5 G/100ML; G/100ML
0.5 INJECTION, SOLUTION INTRAVENOUS CONTINUOUS
Status: DISCONTINUED | OUTPATIENT
Start: 2024-02-12 | End: 2024-02-14

## 2024-02-12 RX ORDER — FENTANYL CITRATE 50 UG/ML
50 INJECTION, SOLUTION INTRAMUSCULAR; INTRAVENOUS EVERY 5 MIN PRN
Status: DISCONTINUED | OUTPATIENT
Start: 2024-02-12 | End: 2024-02-12 | Stop reason: HOSPADM

## 2024-02-12 RX ORDER — LIDOCAINE 40 MG/G
CREAM TOPICAL
Status: DISCONTINUED | OUTPATIENT
Start: 2024-02-12 | End: 2024-02-16 | Stop reason: HOSPADM

## 2024-02-12 RX ORDER — SODIUM CHLORIDE 9 MG/ML
INJECTION, SOLUTION INTRAVENOUS CONTINUOUS
Status: DISCONTINUED | OUTPATIENT
Start: 2024-02-12 | End: 2024-02-16 | Stop reason: HOSPADM

## 2024-02-12 RX ORDER — FENTANYL CITRATE 50 UG/ML
25 INJECTION, SOLUTION INTRAMUSCULAR; INTRAVENOUS EVERY 5 MIN PRN
Status: DISCONTINUED | OUTPATIENT
Start: 2024-02-12 | End: 2024-02-12 | Stop reason: HOSPADM

## 2024-02-12 RX ORDER — NALOXONE HYDROCHLORIDE 0.4 MG/ML
0.2 INJECTION, SOLUTION INTRAMUSCULAR; INTRAVENOUS; SUBCUTANEOUS
Status: DISCONTINUED | OUTPATIENT
Start: 2024-02-12 | End: 2024-02-16 | Stop reason: HOSPADM

## 2024-02-12 RX ORDER — HYDROMORPHONE HYDROCHLORIDE 1 MG/ML
0.4 INJECTION, SOLUTION INTRAMUSCULAR; INTRAVENOUS; SUBCUTANEOUS EVERY 5 MIN PRN
Status: DISCONTINUED | OUTPATIENT
Start: 2024-02-12 | End: 2024-02-12 | Stop reason: HOSPADM

## 2024-02-12 RX ORDER — MONTELUKAST SODIUM 10 MG/1
10 TABLET ORAL AT BEDTIME
Status: DISCONTINUED | OUTPATIENT
Start: 2024-02-12 | End: 2024-02-16 | Stop reason: HOSPADM

## 2024-02-12 RX ORDER — HYDROMORPHONE HCL IN WATER/PF 6 MG/30 ML
0.4 PATIENT CONTROLLED ANALGESIA SYRINGE INTRAVENOUS
Status: DISCONTINUED | OUTPATIENT
Start: 2024-02-12 | End: 2024-02-15

## 2024-02-12 RX ORDER — ACETAMINOPHEN 325 MG/1
650 TABLET ORAL EVERY 4 HOURS PRN
Status: DISCONTINUED | OUTPATIENT
Start: 2024-02-15 | End: 2024-02-16 | Stop reason: HOSPADM

## 2024-02-12 RX ORDER — CYCLOBENZAPRINE HCL 10 MG
10 TABLET ORAL AT BEDTIME
Status: DISCONTINUED | OUTPATIENT
Start: 2024-02-12 | End: 2024-02-16 | Stop reason: HOSPADM

## 2024-02-12 RX ORDER — LISINOPRIL 10 MG/1
10 TABLET ORAL DAILY
Status: DISCONTINUED | OUTPATIENT
Start: 2024-02-13 | End: 2024-02-16 | Stop reason: HOSPADM

## 2024-02-12 RX ORDER — DIPHENHYDRAMINE HYDROCHLORIDE 50 MG/ML
25 INJECTION INTRAMUSCULAR; INTRAVENOUS
Status: DISCONTINUED | OUTPATIENT
Start: 2024-02-12 | End: 2024-02-12 | Stop reason: HOSPADM

## 2024-02-12 RX ORDER — FAMOTIDINE 20 MG/1
20 TABLET, FILM COATED ORAL 2 TIMES DAILY
Status: DISCONTINUED | OUTPATIENT
Start: 2024-02-12 | End: 2024-02-16 | Stop reason: HOSPADM

## 2024-02-12 RX ORDER — CEFAZOLIN SODIUM 2 G/100ML
2 INJECTION, SOLUTION INTRAVENOUS EVERY 8 HOURS
Qty: 200 ML | Refills: 0 | Status: COMPLETED | OUTPATIENT
Start: 2024-02-12 | End: 2024-02-12

## 2024-02-12 RX ORDER — CEFAZOLIN SODIUM/WATER 2 G/20 ML
2 SYRINGE (ML) INTRAVENOUS
Status: COMPLETED | OUTPATIENT
Start: 2024-02-12 | End: 2024-02-12

## 2024-02-12 RX ORDER — PROPOFOL 10 MG/ML
INJECTION, EMULSION INTRAVENOUS PRN
Status: DISCONTINUED | OUTPATIENT
Start: 2024-02-12 | End: 2024-02-12

## 2024-02-12 RX ORDER — DEXAMETHASONE SODIUM PHOSPHATE 4 MG/ML
INJECTION, SOLUTION INTRA-ARTICULAR; INTRALESIONAL; INTRAMUSCULAR; INTRAVENOUS; SOFT TISSUE PRN
Status: DISCONTINUED | OUTPATIENT
Start: 2024-02-12 | End: 2024-02-12

## 2024-02-12 RX ORDER — GLYCOPYRROLATE 0.2 MG/ML
INJECTION, SOLUTION INTRAMUSCULAR; INTRAVENOUS PRN
Status: DISCONTINUED | OUTPATIENT
Start: 2024-02-12 | End: 2024-02-12

## 2024-02-12 RX ORDER — MAGNESIUM HYDROXIDE 1200 MG/15ML
LIQUID ORAL PRN
Status: DISCONTINUED | OUTPATIENT
Start: 2024-02-12 | End: 2024-02-12 | Stop reason: HOSPADM

## 2024-02-12 RX ORDER — HYDROMORPHONE HYDROCHLORIDE 1 MG/ML
0.2 INJECTION, SOLUTION INTRAMUSCULAR; INTRAVENOUS; SUBCUTANEOUS EVERY 5 MIN PRN
Status: DISCONTINUED | OUTPATIENT
Start: 2024-02-12 | End: 2024-02-12 | Stop reason: HOSPADM

## 2024-02-12 RX ORDER — LIDOCAINE HYDROCHLORIDE ANHYDROUS AND DEXTROSE MONOHYDRATE .8; 5 G/100ML; G/100ML
1 INJECTION, SOLUTION INTRAVENOUS CONTINUOUS
Status: DISCONTINUED | OUTPATIENT
Start: 2024-02-12 | End: 2024-02-12 | Stop reason: HOSPADM

## 2024-02-12 RX ORDER — CEFAZOLIN SODIUM/WATER 2 G/20 ML
2 SYRINGE (ML) INTRAVENOUS SEE ADMIN INSTRUCTIONS
Status: DISCONTINUED | OUTPATIENT
Start: 2024-02-12 | End: 2024-02-12 | Stop reason: HOSPADM

## 2024-02-12 RX ORDER — FENTANYL CITRATE 50 UG/ML
INJECTION, SOLUTION INTRAMUSCULAR; INTRAVENOUS PRN
Status: DISCONTINUED | OUTPATIENT
Start: 2024-02-12 | End: 2024-02-12

## 2024-02-12 RX ORDER — PROCHLORPERAZINE MALEATE 10 MG
10 TABLET ORAL EVERY 6 HOURS PRN
Status: DISCONTINUED | OUTPATIENT
Start: 2024-02-12 | End: 2024-02-16 | Stop reason: HOSPADM

## 2024-02-12 RX ORDER — CLONIDINE HYDROCHLORIDE 0.1 MG/1
0.1 TABLET ORAL 2 TIMES DAILY PRN
Status: DISCONTINUED | OUTPATIENT
Start: 2024-02-12 | End: 2024-02-16 | Stop reason: HOSPADM

## 2024-02-12 RX ORDER — ROPINIROLE 0.25 MG/1
0.5 TABLET, FILM COATED ORAL AT BEDTIME
Status: DISCONTINUED | OUTPATIENT
Start: 2024-02-12 | End: 2024-02-16 | Stop reason: HOSPADM

## 2024-02-12 RX ORDER — LIDOCAINE HYDROCHLORIDE 20 MG/ML
INJECTION, SOLUTION INFILTRATION; PERINEURAL PRN
Status: DISCONTINUED | OUTPATIENT
Start: 2024-02-12 | End: 2024-02-12

## 2024-02-12 RX ORDER — PREGABALIN 75 MG/1
150 CAPSULE ORAL EVERY MORNING
Status: DISCONTINUED | OUTPATIENT
Start: 2024-02-13 | End: 2024-02-15

## 2024-02-12 RX ORDER — VANCOMYCIN HYDROCHLORIDE 1 G/20ML
INJECTION, POWDER, LYOPHILIZED, FOR SOLUTION INTRAVENOUS PRN
Status: DISCONTINUED | OUTPATIENT
Start: 2024-02-12 | End: 2024-02-12 | Stop reason: HOSPADM

## 2024-02-12 RX ORDER — SODIUM CHLORIDE, SODIUM LACTATE, POTASSIUM CHLORIDE, CALCIUM CHLORIDE 600; 310; 30; 20 MG/100ML; MG/100ML; MG/100ML; MG/100ML
INJECTION, SOLUTION INTRAVENOUS CONTINUOUS
Status: DISCONTINUED | OUTPATIENT
Start: 2024-02-12 | End: 2024-02-12 | Stop reason: HOSPADM

## 2024-02-12 RX ORDER — ACETAMINOPHEN 325 MG/1
975 TABLET ORAL EVERY 8 HOURS
Qty: 27 TABLET | Refills: 0 | Status: COMPLETED | OUTPATIENT
Start: 2024-02-12 | End: 2024-02-15

## 2024-02-12 RX ORDER — ARIPIPRAZOLE 5 MG/1
5 TABLET ORAL EVERY MORNING
Status: DISCONTINUED | OUTPATIENT
Start: 2024-02-13 | End: 2024-02-16 | Stop reason: HOSPADM

## 2024-02-12 RX ORDER — MAGNESIUM SULFATE HEPTAHYDRATE 40 MG/ML
2 INJECTION, SOLUTION INTRAVENOUS ONCE
Status: DISCONTINUED | OUTPATIENT
Start: 2024-02-12 | End: 2024-02-12 | Stop reason: HOSPADM

## 2024-02-12 RX ORDER — BUSPIRONE HYDROCHLORIDE 15 MG/1
30 TABLET ORAL 2 TIMES DAILY
Status: DISCONTINUED | OUTPATIENT
Start: 2024-02-12 | End: 2024-02-16 | Stop reason: HOSPADM

## 2024-02-12 RX ORDER — ONDANSETRON 2 MG/ML
INJECTION INTRAMUSCULAR; INTRAVENOUS PRN
Status: DISCONTINUED | OUTPATIENT
Start: 2024-02-12 | End: 2024-02-12

## 2024-02-12 RX ORDER — AZELASTINE 1 MG/ML
2 SPRAY, METERED NASAL 2 TIMES DAILY
Status: DISCONTINUED | OUTPATIENT
Start: 2024-02-12 | End: 2024-02-16 | Stop reason: HOSPADM

## 2024-02-12 RX ORDER — PANTOPRAZOLE SODIUM 40 MG/1
40 TABLET, DELAYED RELEASE ORAL EVERY EVENING
Status: DISCONTINUED | OUTPATIENT
Start: 2024-02-12 | End: 2024-02-16 | Stop reason: HOSPADM

## 2024-02-12 RX ORDER — OXYCODONE HYDROCHLORIDE 5 MG/1
10 TABLET ORAL EVERY 4 HOURS PRN
Status: DISCONTINUED | OUTPATIENT
Start: 2024-02-12 | End: 2024-02-15

## 2024-02-12 RX ORDER — ALBUTEROL SULFATE 90 UG/1
AEROSOL, METERED RESPIRATORY (INHALATION) PRN
Status: DISCONTINUED | OUTPATIENT
Start: 2024-02-12 | End: 2024-02-12

## 2024-02-12 RX ORDER — POLYETHYLENE GLYCOL 3350 17 G/17G
17 POWDER, FOR SOLUTION ORAL DAILY
Status: DISCONTINUED | OUTPATIENT
Start: 2024-02-13 | End: 2024-02-16 | Stop reason: HOSPADM

## 2024-02-12 RX ORDER — METHOCARBAMOL 750 MG/1
750 TABLET, FILM COATED ORAL EVERY 6 HOURS PRN
Status: DISCONTINUED | OUTPATIENT
Start: 2024-02-12 | End: 2024-02-14

## 2024-02-12 RX ORDER — HYDROMORPHONE HYDROCHLORIDE 1 MG/ML
0.2 INJECTION, SOLUTION INTRAMUSCULAR; INTRAVENOUS; SUBCUTANEOUS
Status: DISCONTINUED | OUTPATIENT
Start: 2024-02-12 | End: 2024-02-15

## 2024-02-12 RX ORDER — ALBUTEROL SULFATE 90 UG/1
2 AEROSOL, METERED RESPIRATORY (INHALATION) EVERY 6 HOURS PRN
Status: DISCONTINUED | OUTPATIENT
Start: 2024-02-12 | End: 2024-02-16 | Stop reason: HOSPADM

## 2024-02-12 RX ORDER — NALOXONE HYDROCHLORIDE 0.4 MG/ML
0.4 INJECTION, SOLUTION INTRAMUSCULAR; INTRAVENOUS; SUBCUTANEOUS
Status: DISCONTINUED | OUTPATIENT
Start: 2024-02-12 | End: 2024-02-16 | Stop reason: HOSPADM

## 2024-02-12 RX ORDER — HYDROCHLOROTHIAZIDE 12.5 MG/1
12.5 TABLET ORAL DAILY
Status: DISCONTINUED | OUTPATIENT
Start: 2024-02-13 | End: 2024-02-16 | Stop reason: HOSPADM

## 2024-02-12 RX ORDER — ONDANSETRON 4 MG/1
4 TABLET, ORALLY DISINTEGRATING ORAL EVERY 6 HOURS PRN
Status: DISCONTINUED | OUTPATIENT
Start: 2024-02-12 | End: 2024-02-16 | Stop reason: HOSPADM

## 2024-02-12 RX ORDER — KETAMINE HYDROCHLORIDE 10 MG/ML
INJECTION INTRAMUSCULAR; INTRAVENOUS PRN
Status: DISCONTINUED | OUTPATIENT
Start: 2024-02-12 | End: 2024-02-12

## 2024-02-12 RX ORDER — OXYCODONE HYDROCHLORIDE 5 MG/1
15 TABLET ORAL EVERY 4 HOURS PRN
Status: DISCONTINUED | OUTPATIENT
Start: 2024-02-12 | End: 2024-02-15

## 2024-02-12 RX ORDER — DEXMEDETOMIDINE HYDROCHLORIDE 4 UG/ML
INJECTION, SOLUTION INTRAVENOUS PRN
Status: DISCONTINUED | OUTPATIENT
Start: 2024-02-12 | End: 2024-02-12

## 2024-02-12 RX ORDER — PROPOFOL 10 MG/ML
INJECTION, EMULSION INTRAVENOUS CONTINUOUS PRN
Status: DISCONTINUED | OUTPATIENT
Start: 2024-02-12 | End: 2024-02-12

## 2024-02-12 RX ADMIN — AZELASTINE HYDROCHLORIDE 2 SPRAY: 137 SPRAY, METERED NASAL at 22:00

## 2024-02-12 RX ADMIN — HYDROMORPHONE HYDROCHLORIDE 0.2 MG: 1 INJECTION, SOLUTION INTRAMUSCULAR; INTRAVENOUS; SUBCUTANEOUS at 13:53

## 2024-02-12 RX ADMIN — Medication 4 MCG: at 11:30

## 2024-02-12 RX ADMIN — CEFAZOLIN SODIUM 2 G: 2 INJECTION, SOLUTION INTRAVENOUS at 23:27

## 2024-02-12 RX ADMIN — SODIUM CHLORIDE, SODIUM LACTATE, POTASSIUM CHLORIDE, CALCIUM CHLORIDE: 600; 310; 30; 20 INJECTION, SOLUTION INTRAVENOUS at 08:07

## 2024-02-12 RX ADMIN — OXYCODONE HYDROCHLORIDE 10 MG: 5 TABLET ORAL at 14:25

## 2024-02-12 RX ADMIN — SODIUM CHLORIDE, POTASSIUM CHLORIDE, SODIUM LACTATE AND CALCIUM CHLORIDE: 600; 310; 30; 20 INJECTION, SOLUTION INTRAVENOUS at 07:21

## 2024-02-12 RX ADMIN — Medication 2 G: at 08:00

## 2024-02-12 RX ADMIN — METHOCARBAMOL 750 MG: 750 TABLET ORAL at 16:39

## 2024-02-12 RX ADMIN — PHENYLEPHRINE HYDROCHLORIDE 200 MCG: 10 INJECTION INTRAVENOUS at 08:17

## 2024-02-12 RX ADMIN — PHENYLEPHRINE HYDROCHLORIDE 100 MCG: 10 INJECTION INTRAVENOUS at 07:51

## 2024-02-12 RX ADMIN — LIDOCAINE HYDROCHLORIDE 1 MG/KG/HR: 8 INJECTION, SOLUTION INTRAVENOUS at 08:07

## 2024-02-12 RX ADMIN — ONDANSETRON 4 MG: 2 INJECTION INTRAMUSCULAR; INTRAVENOUS at 11:30

## 2024-02-12 RX ADMIN — MIDAZOLAM 2 MG: 1 INJECTION INTRAMUSCULAR; INTRAVENOUS at 07:21

## 2024-02-12 RX ADMIN — HYDROMORPHONE HYDROCHLORIDE 0.25 MG: 1 INJECTION, SOLUTION INTRAMUSCULAR; INTRAVENOUS; SUBCUTANEOUS at 11:37

## 2024-02-12 RX ADMIN — HYDROMORPHONE HYDROCHLORIDE 0.5 MG: 1 INJECTION, SOLUTION INTRAMUSCULAR; INTRAVENOUS; SUBCUTANEOUS at 08:37

## 2024-02-12 RX ADMIN — ACETAMINOPHEN 975 MG: 325 TABLET, FILM COATED ORAL at 14:22

## 2024-02-12 RX ADMIN — HYDROXYCHLOROQUINE SULFATE 200 MG: 200 TABLET ORAL at 21:42

## 2024-02-12 RX ADMIN — MONTELUKAST 10 MG: 10 TABLET, FILM COATED ORAL at 21:42

## 2024-02-12 RX ADMIN — HYDROMORPHONE HYDROCHLORIDE 0.5 MG: 1 INJECTION, SOLUTION INTRAMUSCULAR; INTRAVENOUS; SUBCUTANEOUS at 11:14

## 2024-02-12 RX ADMIN — Medication 4 MCG: at 09:30

## 2024-02-12 RX ADMIN — TRANEXAMIC ACID 10 MG/KG/HR: 100 INJECTION INTRAVENOUS at 08:37

## 2024-02-12 RX ADMIN — FENTANYL CITRATE 25 MCG: 50 INJECTION INTRAMUSCULAR; INTRAVENOUS at 13:14

## 2024-02-12 RX ADMIN — ALBUTEROL SULFATE 6 PUFF: 108 INHALANT RESPIRATORY (INHALATION) at 07:37

## 2024-02-12 RX ADMIN — DEXAMETHASONE SODIUM PHOSPHATE 8 MG: 4 INJECTION, SOLUTION INTRA-ARTICULAR; INTRALESIONAL; INTRAMUSCULAR; INTRAVENOUS; SOFT TISSUE at 07:45

## 2024-02-12 RX ADMIN — Medication 50 MG: at 08:00

## 2024-02-12 RX ADMIN — KETOROLAC TROMETHAMINE 15 MG: 30 INJECTION, SOLUTION INTRAMUSCULAR; INTRAVENOUS at 14:18

## 2024-02-12 RX ADMIN — Medication 90 MG: at 07:31

## 2024-02-12 RX ADMIN — LIDOCAINE HYDROCHLORIDE 80 MG: 20 INJECTION, SOLUTION INFILTRATION; PERINEURAL at 07:28

## 2024-02-12 RX ADMIN — SUGAMMADEX 200 MG: 100 INJECTION, SOLUTION INTRAVENOUS at 11:42

## 2024-02-12 RX ADMIN — HYDROMORPHONE HYDROCHLORIDE 0.2 MG: 1 INJECTION, SOLUTION INTRAMUSCULAR; INTRAVENOUS; SUBCUTANEOUS at 18:07

## 2024-02-12 RX ADMIN — Medication 4 MCG: at 10:30

## 2024-02-12 RX ADMIN — PHENYLEPHRINE HYDROCHLORIDE 0.3 MCG/KG/MIN: 10 INJECTION INTRAVENOUS at 08:25

## 2024-02-12 RX ADMIN — LIDOCAINE HYDROCHLORIDE 0.5 MG/KG/HR: 8 INJECTION, SOLUTION INTRAVENOUS at 13:26

## 2024-02-12 RX ADMIN — FENTANYL CITRATE 25 MCG: 50 INJECTION INTRAMUSCULAR; INTRAVENOUS at 13:05

## 2024-02-12 RX ADMIN — PROPOFOL 100 MG: 10 INJECTION, EMULSION INTRAVENOUS at 07:30

## 2024-02-12 RX ADMIN — SODIUM CHLORIDE: 9 INJECTION, SOLUTION INTRAVENOUS at 16:39

## 2024-02-12 RX ADMIN — Medication 10 MG: at 09:30

## 2024-02-12 RX ADMIN — CEFAZOLIN SODIUM 2 G: 2 INJECTION, SOLUTION INTRAVENOUS at 16:39

## 2024-02-12 RX ADMIN — CYCLOBENZAPRINE 10 MG: 10 TABLET, FILM COATED ORAL at 21:42

## 2024-02-12 RX ADMIN — Medication 20 MCG: at 08:35

## 2024-02-12 RX ADMIN — SENNOSIDES AND DOCUSATE SODIUM 1 TABLET: 8.6; 5 TABLET ORAL at 21:42

## 2024-02-12 RX ADMIN — ROPINIROLE HYDROCHLORIDE 0.5 MG: 0.25 TABLET, FILM COATED ORAL at 21:41

## 2024-02-12 RX ADMIN — ACETAMINOPHEN 975 MG: 325 TABLET, FILM COATED ORAL at 21:45

## 2024-02-12 RX ADMIN — FAMOTIDINE 20 MG: 20 TABLET ORAL at 21:42

## 2024-02-12 RX ADMIN — GLYCOPYRROLATE 0.2 MG: 0.2 INJECTION, SOLUTION INTRAMUSCULAR; INTRAVENOUS at 08:15

## 2024-02-12 RX ADMIN — PROPOFOL 30 MCG/KG/MIN: 10 INJECTION, EMULSION INTRAVENOUS at 08:07

## 2024-02-12 RX ADMIN — FENTANYL CITRATE 25 MCG: 50 INJECTION INTRAMUSCULAR; INTRAVENOUS at 12:30

## 2024-02-12 RX ADMIN — SODIUM CHLORIDE, POTASSIUM CHLORIDE, SODIUM LACTATE AND CALCIUM CHLORIDE: 600; 310; 30; 20 INJECTION, SOLUTION INTRAVENOUS at 10:07

## 2024-02-12 RX ADMIN — ACETAMINOPHEN 975 MG: 325 TABLET, FILM COATED ORAL at 06:26

## 2024-02-12 RX ADMIN — ONDANSETRON 4 MG: 2 INJECTION INTRAMUSCULAR; INTRAVENOUS at 07:21

## 2024-02-12 RX ADMIN — Medication 30 MG: at 08:36

## 2024-02-12 RX ADMIN — TRANEXAMIC ACID 2586 MG: 100 INJECTION INTRAVENOUS at 08:07

## 2024-02-12 RX ADMIN — PROPOFOL 40 MG: 10 INJECTION, EMULSION INTRAVENOUS at 07:54

## 2024-02-12 RX ADMIN — FENTANYL CITRATE 100 MCG: 50 INJECTION INTRAMUSCULAR; INTRAVENOUS at 07:28

## 2024-02-12 RX ADMIN — FENTANYL CITRATE 25 MCG: 50 INJECTION INTRAMUSCULAR; INTRAVENOUS at 13:37

## 2024-02-12 RX ADMIN — DULOXETINE HYDROCHLORIDE 120 MG: 60 CAPSULE, DELAYED RELEASE ORAL at 22:13

## 2024-02-12 RX ADMIN — BUSPIRONE HYDROCHLORIDE 30 MG: 15 TABLET ORAL at 21:59

## 2024-02-12 RX ADMIN — PANTOPRAZOLE SODIUM 40 MG: 40 TABLET, DELAYED RELEASE ORAL at 21:42

## 2024-02-12 ASSESSMENT — ACTIVITIES OF DAILY LIVING (ADL)
ADLS_ACUITY_SCORE: 28
ADLS_ACUITY_SCORE: 27
ADLS_ACUITY_SCORE: 28

## 2024-02-12 NOTE — CONSULTS
[facility]  Consult Note - Hospitalist Service  Date of Admission:  2/12/2024  Consult Requested by: Orthopedics  Reason for Consult: co management     Assessment & Plan   Zayra Ramirez is a 54 year old female with PMH significant for asthma, ILD, seronegative inflammatory arthritis, ROBERT, HTN, GERD, who was admitted on 2/12/2024 to Orthopedics Serivce for planned cervical spinal fusion of C2-5 and laminectomies of cervical 3-4 for cervical radiculopathy and cervical spondylosis.     Spondylolisthesis C2-3  Cervical radiculopathy s/p fusion C2-5, laminectomies C3-4 02/12/2024  Imaging from 02/2023 with broken C3 screws and lack of bridgin of bone from previous fusion from 11/2022 as well as sponylolisthesis C2-3. Presented for planned above surgery without complications.   -Orthopedics surgery as Primary   -Pain management per Primary team     Acute hypoxic respiratory insufficiency   Suspect in the post operative setting. Afebrile, denies dyspnea or cough.   -Monitor  -Wean supplemental O2 as able   -IS     Restless legs: ropinirole 0.5mg at bedtime; lyrica 150mg in AM at 1pm   Bipolar disorder  Depression: continue PTA aripiprazole, duloxetine, buspar    TMJ: PTA cyclobenzaprine   GERD: PTA omeprazole   HTN: PTA hydrochlorothiazide-lisinopril   ILD   Moderate persistent asthma: follows with Pulmonary at [facility]. Last seen 10/2023; PTA albuterol and PT singulair, Stioloto   Allergic rhinitis: PTA astelin   History of tobacco use disorder: quite 2022  Seronegative inflammatory arthritis: follows with Rheumatology, last seen 10/2023; continue hydroxychloroquine 200mg BID, methotrexate has been paused in the setting of surgeries, will discuss with Rheumatologist, typically has symptoms in shoulders.   ROBERT: CPAP overnight, wears 3L supplemental O2 overnight.   BMI 32: working with PCP on weight loss, has not yet start Zepbound; no longer on Saxenda      The patient's care  "was discussed with the Patient.    Clinically Significant Risk Factors Present on Admission                  # Hypertension: Noted on problem list   # Non-Invasive mechanical ventilation: current O2 Device: BiPAP/CPAP (pt home CPAP settings)  # Acute hypoxic respiratory failure: continue supplemental O2 as needed     # Obesity: Estimated body mass index is 32.23 kg/m  as calculated from the following:    Height as of this encounter: 1.626 m (5' 4.02\").    Weight as of this encounter: 85.2 kg (187 lb 13.3 oz).       # Asthma: noted on problem list        Jayshree Valdez PA-C  Hospitalist Service  Securely message with [employer] (more info)  Text page via [facility] Paging/Directory   ______________________________________________________________________    Chief Complaint   \"I'm doing well after surgery\"    History is obtained from the patient    History of Present Illness   Zayra Ramirez is a 54 year old female with PMH significant for asthma, ILD, seronegative inflammatory arthritis, ROBERT, HTN, GERD, who was admitted on 2/12/2024 to Orthopedics Serivce for planned cervical spinal fusion of C2-5 and laminectomies of cervical 3-4 for cervical radiculopathy and cervical spondylosis.     Feeling ok. No concerns of fevers, chills, dizziness, lightheadedness, chest pain, dyspnea, cough, abdominal pain, nausea, vomiting, new rashes or lesions on uncovered surfaces       Past Medical History    Past Medical History:   Diagnosis Date    Allergic rhinitis     Anemia     Arthritis     Asthma     copd    Cervical pseudoarthrosis, sequela     Dental abscess 08/2015    Depressive disorder     Diaphragmatic hernia 07/11/2016    Gastroesophageal reflux disease     History of emphysema (H)     Hoarseness     Hypertension     Obstructive sleep apnea     Other chronic pain     Respiratory bronchiolitis interstitial lung disease (H)     Sleep apnea     Smoker 11/02/2015       Past Surgical History   Past Surgical History:   Procedure " Laterality Date    CERVICAL FUSION      COLONOSCOPY      COLONOSCOPY N/A 02/06/2020    Procedure: COLONOSCOPY, WITH POLYPECTOMY AND BIOPSY;  Surgeon: Julian Mccullough MD;  Location:  GI    CYSTOSCOPY      ENT SURGERY      ESOPHAGOSCOPY, GASTROSCOPY, DUODENOSCOPY (EGD), COMBINED N/A 02/06/2020    Procedure: ESOPHAGOGASTRODUODENOSCOPY (EGD);  Surgeon: Julian Mccullough MD;  Location:  GI    EXCISE LESION INTRAORAL Bilateral 10/03/2018    Procedure: EXCISE LESION INTRAORAL;  Wide Local Excision Of of Left Oral Cavity Ulcer;  Surgeon: Morro Mijares MD;  Location: [facility] OR    GYN SURGERY      HC DRAIN SKIN ABSCESS SIMPLE/SINGLE  03/16/2012    Procedure:INCISION AND DRAINAGE, ABSCESS, SIMPLE; Surgeon:CHRISTIANO HANCOCK; Location: GI    HEAD & NECK SURGERY      HYSTERECTOMY      HYSTERECTOMY      INCISION AND DRAINAGE ABDOMEN WASHOUT, COMBINED      INJECT EPIDURAL CERVICAL N/A 10/14/2021    Procedure: Cervical 7- Thoracic 1 epidural steroid injection with fluoroscopy;  Surgeon: Tram Florian MD;  Location: [facility] OR    INJECT EPIDURAL LUMBAR Right 09/15/2020    Procedure: Lumbar5- sacral 1 epidural steroid injection with fluoroscopy;  Surgeon: Tram Florian MD;  [facility]    INJECT EPIDURAL LUMBAR Right 06/29/2021    Procedure: Lumbar 5 sacral 1 epidural steroid injection with fluoroscopy;  Surgeon: Tram Florian MD;  Location: [facility] OR    INJECT SACROILIAC JOINT Bilateral 06/16/2020    Procedure: Bilateral sacroiliac joint steroid injection with fluoroscopy;  Surgeon: Tram Florian MD;  [facility]    LAMINECTOMY THORACIC ONE LEVEL N/A 08/19/2019    Procedure: LAMINECTOMY, SPINE, THORACIC, 11-12 and Part of Lumbar 1, DRAINAGE OF EPIDURAL ABCESS, Epidural Drain Placement X 2;  Surgeon: Yadiel Beal MD;  [facility]    OPTICAL TRACKING SYSTEM FUSION SPINE POSTERIOR LUMBAR THREE+ LEVELS N/A 11/30/2022    Procedure: Posterior Instrumented Spinal Fusion Thoracic 8 to Sacrum  with Bilateral Pelvic Fixation; Transforaminal Lumbar Interbody Fusion with Erickson-Nixon Osteotomy Lumbar 1 to Sacral 1 (5 levels); Use of Infuse Bone Morphogenetic Protein Large Kit and Allograft;  Surgeon: Philip Wilhelm MD;  Location:  OR    ORTHOPEDIC SURGERY      AR PERCUT IMPLNT NEUROELECT,EPIDURAL N/A 2019    Procedure: TRIAL, SPINAL CORD STIMULATOR WITH BOSTON SCIENTIFIC;  Surgeon: Sipple, Daniel Peter, DO;  [facility]: [facility];  Service: Pain    RADIO FREQUENCY ABLATION / DESTRUCTION OF SACROILOAC JOINT DORSAL PRIMARY RAMUS Bilateral 2019    Procedure: Bilateral lumbar radiofrequency ablation with fluoroscopy and intravenous sedation ( Lumbar 2,3,4,5 medial branch nerves for the bilateral lumbar3-4, 4-5 and 5-sacral1 joints.;  Surgeon: Tram Florian MD;  Location: [facility]    RADIO FREQUENCY ABLATION / DESTRUCTION OF SACROILOAC JOINT DORSAL PRIMARY RAMUS Right 2020    Procedure: Right lumbar medial branch nerve radiofrequency ablation right L2,3,4,5 nerves supplying the right L3-4, L4-5 and L5-S1 facet joints;  Surgeon: Tram Florian MD;  Location: [facility] [place]    spinal cord stimulator  2019    spinal cord stimulator removal  2019       Medications   I have reviewed this patient's current medications       Review of Systems    The 10 point Review of Systems is negative other than noted in the HPI or here.     Social History   I have reviewed this patient's social history and updated it with pertinent information if needed.  Social History     Tobacco Use    Smoking status: Former     Packs/day: 1.50     Years: 35.00     Additional pack years: 0.00     Total pack years: 52.50     Types: Cigarettes     Start date: 1996     Quit date: 2021     Years since quittin.2    Smokeless tobacco: Former     Quit date: 2021   Vaping Use    Vaping Use: Former   Substance Use Topics    Alcohol use: Never    Drug use: Yes     Types: Marijuana     Comment:  smoke 2x times a week         Family History   I have reviewed this patient's family history and updated it with pertinent information if needed.  Family History   Problem Relation Age of Onset    Asthma Mother     Restless Leg Syndrome Mother     Other Cancer Father         base of tongue cancer at age ~65    Hypertension Father     Back Pain Father     Restless Leg Syndrome Father     Coronary Artery Disease Father     Diabetes Father     Depression Father     Anxiety Disorder Father     Osteoporosis Father     Restless Leg Syndrome Sister     Breast Cancer Maternal Aunt 55    Anesthesia Reaction No family hx of     Deep Vein Thrombosis (DVT) No family hx of     Thyroid Cancer No family hx of     Multiple endocrine neoplasia No family hx of          Allergies   Allergies   Allergen Reactions    Bee Venom Anaphylaxis    Doxycycline Anaphylaxis     Patient thinks it may have been just nausea and vomiting, however unable to confirm  Other reaction(s): throat closes    Erythromycin      Other reaction(s): Vomiting      Hydrocodone-Acetaminophen Itching        Physical Exam   Vital Signs: Temp: 98.2  F (36.8  C) Temp src: Oral BP: (!) 150/92 Pulse: 75   Resp: 16 SpO2: 95 % O2 Device: BiPAP/CPAP (pt home CPAP settings) Oxygen Delivery: 3 LPM  Weight: 187 lbs 13.31 oz    General: laying in bed, alert, cooperative, awake, in no acute distress  HEENT: normocephalic, atraumatic, anicteric sclera, moist mucus membranes, no lymphadenopathy appreciated, PERRLA, EOM wnl; [place] J in place, missing teeth   Respiratory: breathing comfortably on room air, clear to auscultation bilaterally without wheezing, crackles, or rhonchi appreciated  Cardiac: regular rate and rhythm with normal S1/S2 without murmurs, clicks, rubs or gallops, 2+ radial pulse on LUE, no signs of peripheral edema bilaterally  GI: soft, non-distended, non-tender per palpation, normoactive bowel sounds  : bond catheter in place draining yellow urine   Neuro:  grossly non-focal, alert and oriented, normal speech  MSK: no bony deformities, moving all extremities independently  Skin: no rashes or lesions on uncovered surfaces, no jaundice      Medical Decision Making       60 MINUTES SPENT BY ME on the date of service doing chart review, history, exam, documentation & further activities per the note.      Data   NOTE: Data reviewed over the past 24 hrs contributes toward MDM complexity

## 2024-02-12 NOTE — BRIEF OP NOTE
[facility]    Brief Operative Note    Pre-operative diagnosis: Cervical pseudoarthrosis, sequela [S12.9XXS]  Cervical radiculopathy [M54.12]  Cervical spondylosis with myelopathy [M47.12]  Post-operative diagnosis Same as pre-operative diagnosis    Procedure: Posterior instrumented spinal fusion cervical 2-5; laminectomies cervical 3-4; use of local autograft and crushed cancellous allograft., N/A - Spine    Surgeon: Surgeon(s) and Role:     * Philip Wilhelm MD - Primary     * David Barrett MD - Fellow - Assisting  Anesthesia: General   Estimated Blood Loss: 50 mL from 2/12/2024  7:25 AM to 2/12/2024 11:54 AM      Drains: Hemovac  Specimens: * No specimens in log *  Findings:   None.  Complications: None.  Implants:   Implant Name Type Inv. Item Serial No.  Lot No. LRB No. Used Action   GRAFT [address] 1-10MM [id number] - [id number] Bone/Tissue/Biologic GRAFT [address] 1-10MM [id number] [employer]  N/A 1 Implanted   IMP SET SCREW [employer] INFINITY [id number] - [id number] Metallic Hardware/[place] IMP SET SCREW [employer] INFINITY [id number]  [employer]ANE[employer]  N/A 8 Implanted   SCREW BN 28MM 4MM MA NS INFNT SPNE LF - [id number] Metallic Hardware/[place]inson SCREW BN 28MM 4MM MA NS INFNT SPNE LF  [employer]  N/A 2 Implanted   IMP SCREW INFINITY SPINE MULTIAXIAL 16MMX3.5MM [id number] - [id number] Metallic Hardware/[place]inson IMP SCREW INFINITY SPINE MULTIAXIAL 16MMX3.5MM [id number]  [employer]  N/A 2 Implanted   IMP SCREW INFINITY SPINE MULTIAXIAL 18MMX3.5MM [id number] - [id number] Metallic Hardware/[place]inson IMP SCREW INFINITY SPINE MULTIAXIAL 18MMX3.5MM [id number]  [employer]  N/A 3 Implanted   IMP SCREW INFINITY SPINE [id number] [id number] - [id number] Metallic Hardware/[place]inson IMP SCREW INFINITY SPINE [id number] [id number]  [employer]  N/A 1 Implanted   BRE SPNL 50MM 3.5MM PCUT - [id number] Metallic  Hardware/[place] BRE SPNL 50MM 3.5MM PCUT  [employer]  N/A 2 Implanted

## 2024-02-12 NOTE — ANESTHESIA PROCEDURE NOTES
Airway       Patient location during procedure: OR       Procedure Start/Stop Times: 2/12/2024 7:36 AM  Staff -        Anesthesiologist:  Sierra Dunaway MD       CRNA: Thea Curran APRN CRNA       Performed By: CRNA  Consent for Airway        Urgency: elective  Indications and Patient Condition       Indications for airway management: estela-procedural, airway protection and altered level of consciousness       Induction type:intravenous       Mask difficulty assessment: 1 - vent by mask    Final Airway Details       Final airway type: endotracheal airway       Successful airway: ETT - single and Oral  Endotracheal Airway Details        ETT size (mm): 7.0       Cuffed: yes       Successful intubation technique: video laryngoscopy       VL Blade Size: Glidescope 3       Grade View of Cords: 1       Adjucts: stylet       Position: Right       Measured from: gums/teeth       Secured at (cm): 21       Bite block used: Soft    Post intubation assessment        Placement verified by: capnometry, equal breath sounds and chest rise        Number of attempts at approach: 1       Number of other approaches attempted: 0       Secured with: tape       Ease of procedure: easy       Dentition: Intact and Unchanged    Medication(s) Administered   Medication Administration Time: 2/12/2024 7:36 AM

## 2024-02-12 NOTE — ANESTHESIA POSTPROCEDURE EVALUATION
Patient: Zayra Ramirez    Procedure: Procedure(s):  Posterior instrumented spinal fusion cervical 2-5; laminectomies cervical 3-4; use of local autograft and crushed cancellous allograft.       Anesthesia Type:  General    Note:  Disposition: Inpatient   Postop Pain Control: Uneventful            Sign Out: Well controlled pain   PONV: No   Neuro/Psych: Uneventful            Sign Out: Acceptable/Baseline neuro status   Airway/Respiratory: Uneventful            Sign Out: Acceptable/Baseline resp. status; O2 supplementation (uses 3L NC at night routinely)               Oxygen: Nasal Cannula   CV/Hemodynamics: Uneventful            Sign Out: Acceptable CV status; No obvious hypovolemia; No obvious fluid overload   Other NRE: NONE   DID A NON-ROUTINE EVENT OCCUR? No           Last vitals:  Vitals Value Taken Time   /77 02/12/24 1415   Temp 36.5  C (97.7  F) 02/12/24 1245   Pulse 72 02/12/24 1418   Resp 11 02/12/24 1418   SpO2 100 % 02/12/24 1418   Vitals shown include unfiled device data.    Electronically Signed By: Sierra Dunaway MD  February 12, 2024  2:19 PM

## 2024-02-12 NOTE — ANESTHESIA PROCEDURE NOTES
Arterial Line Procedure Note    Pre-Procedure   Staff -        Anesthesiologist:  Sierra Dunaway MD       Performed By: anesthesiologist       Location: OR       Pre-Anesthestic Checklist: patient identified, IV checked, risks and benefits discussed, informed consent, monitors and equipment checked, pre-op evaluation and at physician/surgeon's request  Timeout:       Correct Patient: Yes        Correct Procedure: Yes        Correct Site: Yes        Correct Position: Yes   Line Placement:   This line was placed Post Induction starting at 7/30/2024 7:30 AM and ending at 2/12/2024 7:45 AM  Procedure   Procedure: arterial line       Laterality: left       Insertion Site: radial.  Sterile Prep        Standard elements of sterile barrier followed       Skin prep: Chloraprep  Insertion/Injection        Technique: ultrasound guided, Seldinger Technique and Tim's test completed        1. Ultrasound was used to evaluate the access site.       2. Artery evaluated via ultrasound for patency/adequacy.       3. Using real-time ultrasound the needle/catheter was observed entering the artery/vein.       5. The visualized structures were anatomically normal.       Catheter Type/Size: 20 G, 1.75 in/4.5 cm quick cath (integral wire)  Narrative        Tegaderm dressing used.       Complications: None apparent,        Arterial waveform: Yes        IBP within 10% of NIBP: Yes

## 2024-02-12 NOTE — ANESTHESIA CARE TRANSFER NOTE
Patient: Zayra Ramirez    Procedure: Procedure(s):  Posterior instrumented spinal fusion cervical 2-5; laminectomies cervical 3-4; use of local autograft and crushed cancellous allograft.       Diagnosis: Cervical pseudoarthrosis, sequela [S12.9XXS]  Cervical radiculopathy [M54.12]  Cervical spondylosis with myelopathy [M47.12]  Diagnosis Additional Information: No value filed.    Anesthesia Type:   General     Note:    Oropharynx: oropharynx clear of all foreign objects  Level of Consciousness: drowsy  Oxygen Supplementation: face mask  Level of Supplemental Oxygen (L/min / FiO2): 6  Independent Airway: airway patency satisfactory and stable  Dentition: dentition unchanged  Vital Signs Stable: post-procedure vital signs reviewed and stable  Report to RN Given: handoff report given  Patient transferred to: PACU    Handoff Report: Identifed the Patient, Identified the Reponsible Provider, Reviewed the pertinent medical history, Discussed the surgical course, Reviewed Intra-OP anesthesia mangement and issues during anesthesia, Set expectations for post-procedure period and Allowed opportunity for questions and acknowledgement of understanding      Vitals:  Vitals Value Taken Time   /86 02/12/24 1159   Temp     Pulse 118 02/12/24 1209   Resp 17 02/12/24 1209   SpO2 97 % 02/12/24 1209   Vitals shown include unfiled device data.    Electronically Signed By: NICKO Shankar CRNA  February 12, 2024  12:10 PM

## 2024-02-12 NOTE — OP NOTE
Date of Service:  2/12/2024       Surgeon:  Philip Wilhelm MD   Assistant:  David Barrett MD, Spine Fellow.  No qualified resident available.  Dr. Barrett's assistance was deemed necessary given case complexity, and for all parts of the procedure, including positioning, exposure, performing the surgical procedure, and closure.      Preoperative Diagnosis:     1.  Pseudarthrosis s/p ACDF C3-4 and C4-5 (2017, Dr. Monique), with broken C3 screws and lack of bridging bone on CT scan (2/3/23), and motion demonstrated on flex-ext x-rays (2/16/23).  2.  Spondylolisthesis C2-3.  3. Advanced multilevel cervical facet arthropathy C2-T1.   4.  Chronic axial neck pain.  5.  Bilateral radicular arm pain.      Postoperative Diagnosis:     Same      Procedures:   Bilateral segmental posterior instrumentation C2-C5, utilizing pedicle screws C2, and lateral mass screws C3-C5.  Posterior spinal fusion C2-C5, using local autograft and crushed cancellous allograft.  Laminectomies C3 and C4, corresponding to C3-4 and C4-5 interspaces.  Application of Ignacio-Wells cranial tongs with bivector traction for operative positioning.  Computer navigation using O-arm and Stealth.    A -22 modifier is justified for use in this case given the abnormal anatomy, specifically at the right C2-3 level, where the joint was not in its usual location, and instead was higher up where the C2 pars normally is.  This necessitated adjustments in location and trajectory of C2 screw.  Furthermore, there was significant multilevel cervical spondylosis but also resulted in alteration of local anatomy.  Anesthesia:  General endotracheal.   Local anesthetic:  N/A; patient received intraoperative IV lidocaine infusion.  EBL: 50 mL.  Complications:  None apparent.   Implants / Equipment used:   Medtronic Infinity screw system: C2 = 4.0 x 28 mm bilateral; C3 = 3.5 x 16 mm right, 3.5 x 18 mm left; C4 = 3.5 x 16 mm right, 3.5 x 18 mm left; C5 = 3.5 x 14 mm right, 3.5  x 18 mm left.  3.5 x 50 mm precut pre-bent cobalt chrome rods x 2 further manually contoured.  TXA high dose 30/10.  Vancomycin powder 1 gm deep.  Crushed cancellous allograft 15 mL.    Indications:  54 year old female with previous 2-level ACDF C3-C5 in 2017.  Presents with continued significant neck and arm pain R>L.  Imaging revealed pseudarthrosis at both; verified by radiologist, stenosis C3-C5, spondylolisthesis C2-3; multilevel advanced cervical facet arthrosis.  Tried nonoperative treatment, continues to have significant disabling symptoms.  I thus offered surgery in form of posterior spinal fusion C2-C5 with laminectomies C3 and C4.  Consented after thorough discussion of the rationale, risks, benefits and alternatives.  Discussed bone graft options; agreed to use of local autograft and crushed cancellous allograft.      Details:  Properly identified in preop area, site marked, informed consent signed.  Wheeled to OR.  Brief earlier performed.  General anesthesia administered.  Hicks inserted.  Antibiotic given: Cefazolin IV.  TXA started.  Mateus-Kike cranial tongs applied.  Patient turned prone on Trios table with combo pads and large hip pads.  Bivector traction set up.  20 pound inline traction weight placed.  Later in the case, weights were transferred to the second rope to increase cervical lordosis.  Arms wrapped in purple foam arm wraps; tucked on the sides on top of large hip pads.  Shoulders taped down.  Lower hairline clipped.  Posterior neck region squared off, prepped with chlorhexidine scrub, alcohol and chloraprep and draped in sterile fashion.  Surgical timeout performed.  No neuromonitoring utilized for this case; patient kept relaxed throughout procedure.     Posterior midline incision made over approximate C2-C5.  Bifid spinous processes osteotomized.  Bilateral superiosteal exposure performed out to lateral edges of lateral masses and trasverse processes.  Used local anatomic landmarks  for provisional localization.  Exposed from C2-C5.  O-arm brought in.  Kocher clamps placed over presumed C3 and C4 spinous processes.  Lateral O-arm image showed clamps at correct levels; verified by radiologist.  Confirmatory timeout performed.  Spinous process clamp with joann frame applied at C2, oriented cephalad.  3D scan obtained inclusive of C2-C5 for navigation purposes.    Posterior screws placed C2-C5 using navigation.  At C2, we placed bilateral pedicle screws.  Of note, the right C2-3 joint was morphologically abnormal and extended to the C2 pars.  Thus, the screw ended up going through the C2-3 facet joint.  We used joann probe to identify starting points.   holes created using kasandra.  Bony tracks created using joann drill with 30 mm stop.  We initially undertapped using 3.5 mm joann tap.  However, on the right side, we were having difficulty inserting 4.0 millimeter screw.  Thus, we stopped line to line using 4.0 mm joann tap.  We were then able to place our 4.0 millimeter screws.  Of note, as we placed our screws, we also decorticated the facet joints adjacent to the screws using kasandra.    At C3-C5, we placed bilateral lateral mass screws.  Starting points identified using anatomic landmarks.  Bilateral is created using kasandra.  Bony tracks created using joann drill with 16 to 18 mm stop.  Used 3.0 mm joann tap, inserted 3.5 millimeter screws using joann .  Check scan showed good placement of all screws.    Laminectomies performed at 2 levels C3 and C4 (corresponding to C3-4 and C4-5 interspaces).  Bilateral vertical gutters created along the junction of the lamina and lateral masses created using kasandra.  We completed this using kasandra and Kerrison.  The spinous process and lamina were then removed en bloc using Kocher clamp.  This was performed at both levels.  We then cleaned out the laminectomy edges using 3 mm Kerrison.  Epidural bleeders controlled using Surgi-Jose Angel.  Bone saved for grafting  purposes.    Prior to placing rods, we transferred the 20 pound traction weight from the first rope to the second (more vertical) rope.  Thus, this extended her head and increased cervical lordosis.    Rods measured; used precut pre-bent 3.5 mm cobalt chrome rods; further contoured manually using Wallisian queen.  Rods seated starting from the top; set plugs applied.  AP and lateral O-arm images obtained; showed good coronal and sagittal alignment, good placement of all implants.  O-arm removed.  Construct final tightened.    Posterior fusion performed C2-C5.  Posterior elements further decorticated using kasandra.  Bone graft (local autograft and crushed cancellous allograft) packed into decorticated facet joints and over the posterior elements.    Vancomycin powder 1 g applied deep.  Deep medium Hemovac drain.  Closure performed in layers using #1 Vicryl, 0 Vicryl, 2-0 Vicryl and 3-0 Monocryl.  Skin glue and sterile dressings applied.  Patient turned supine; tongs removed.  Taken off general anesthetic.  Transferred to recovery room in satisfactory condition.      Postop:  Admit to surgical floor C bed.  Antibiotics x 24 hrs.  Mechanical DVT prophylaxis.  Hospitalist medical co-management.  PT and OT consult.  No lifting more than 10 pounds, avoid excessive bending or twisting.  Orthotics consult for [place] J collar, to be worn full time for next 6-12 weeks.  Cervical ap-lat x-rays prior to discharge.  Anticipate discharge in 2-4 days.  RTC 6 weeks with cervical AP-lat x-rays.

## 2024-02-13 ENCOUNTER — APPOINTMENT (OUTPATIENT)
Dept: PHYSICAL THERAPY | Facility: CLINIC | Age: 54
DRG: 472 | End: 2024-02-13
Attending: ORTHOPAEDIC SURGERY
Payer: COMMERCIAL

## 2024-02-13 LAB
ANION GAP SERPL CALCULATED.3IONS-SCNC: 8 MMOL/L (ref 7–15)
BUN SERPL-MCNC: 11.4 MG/DL (ref 6–20)
CALCIUM SERPL-MCNC: 8.2 MG/DL (ref 8.6–10)
CHLORIDE SERPL-SCNC: 103 MMOL/L (ref 98–107)
CREAT SERPL-MCNC: 0.69 MG/DL (ref 0.51–0.95)
DEPRECATED HCO3 PLAS-SCNC: 28 MMOL/L (ref 22–29)
EGFRCR SERPLBLD CKD-EPI 2021: >90 ML/MIN/1.73M2
GLUCOSE BLDC GLUCOMTR-MCNC: 116 MG/DL (ref 70–99)
GLUCOSE BLDC GLUCOMTR-MCNC: 118 MG/DL (ref 70–99)
GLUCOSE SERPL-MCNC: 113 MG/DL (ref 70–99)
HGB BLD-MCNC: 11.6 G/DL (ref 11.7–15.7)
POTASSIUM SERPL-SCNC: 4 MMOL/L (ref 3.4–5.3)
SODIUM SERPL-SCNC: 139 MMOL/L (ref 135–145)

## 2024-02-13 PROCEDURE — 99232 SBSQ HOSP IP/OBS MODERATE 35: CPT | Performed by: INTERNAL MEDICINE

## 2024-02-13 PROCEDURE — 120N000002 HC R&B MED SURG/OB UMMC

## 2024-02-13 PROCEDURE — 97161 PT EVAL LOW COMPLEX 20 MIN: CPT | Mod: GP

## 2024-02-13 PROCEDURE — 85018 HEMOGLOBIN: CPT | Performed by: ORTHOPAEDIC SURGERY

## 2024-02-13 PROCEDURE — 250N000013 HC RX MED GY IP 250 OP 250 PS 637: Performed by: ORTHOPAEDIC SURGERY

## 2024-02-13 PROCEDURE — 97530 THERAPEUTIC ACTIVITIES: CPT | Mod: GP

## 2024-02-13 PROCEDURE — 250N000013 HC RX MED GY IP 250 OP 250 PS 637: Performed by: PHYSICIAN ASSISTANT

## 2024-02-13 PROCEDURE — 999N000111 HC STATISTIC OT IP EVAL DEFER

## 2024-02-13 PROCEDURE — 36415 COLL VENOUS BLD VENIPUNCTURE: CPT | Performed by: ORTHOPAEDIC SURGERY

## 2024-02-13 PROCEDURE — 250N000011 HC RX IP 250 OP 636: Performed by: ORTHOPAEDIC SURGERY

## 2024-02-13 PROCEDURE — 80048 BASIC METABOLIC PNL TOTAL CA: CPT | Performed by: ORTHOPAEDIC SURGERY

## 2024-02-13 PROCEDURE — 97116 GAIT TRAINING THERAPY: CPT | Mod: GP

## 2024-02-13 RX ADMIN — AZELASTINE HYDROCHLORIDE 2 SPRAY: 137 SPRAY, METERED NASAL at 20:17

## 2024-02-13 RX ADMIN — PREGABALIN 150 MG: 75 CAPSULE ORAL at 13:36

## 2024-02-13 RX ADMIN — LISINOPRIL 10 MG: 10 TABLET ORAL at 08:30

## 2024-02-13 RX ADMIN — OXYCODONE HYDROCHLORIDE 10 MG: 5 TABLET ORAL at 13:37

## 2024-02-13 RX ADMIN — ROPINIROLE HYDROCHLORIDE 0.5 MG: 0.25 TABLET, FILM COATED ORAL at 20:24

## 2024-02-13 RX ADMIN — PREGABALIN 150 MG: 75 CAPSULE ORAL at 08:23

## 2024-02-13 RX ADMIN — OXYCODONE HYDROCHLORIDE 10 MG: 5 TABLET ORAL at 22:11

## 2024-02-13 RX ADMIN — DULOXETINE HYDROCHLORIDE 120 MG: 60 CAPSULE, DELAYED RELEASE ORAL at 21:05

## 2024-02-13 RX ADMIN — SENNOSIDES AND DOCUSATE SODIUM 1 TABLET: 8.6; 5 TABLET ORAL at 20:16

## 2024-02-13 RX ADMIN — ACETAMINOPHEN 975 MG: 325 TABLET, FILM COATED ORAL at 21:05

## 2024-02-13 RX ADMIN — HYDROCHLOROTHIAZIDE 12.5 MG: 12.5 TABLET ORAL at 08:30

## 2024-02-13 RX ADMIN — HYDROXYCHLOROQUINE SULFATE 200 MG: 200 TABLET ORAL at 08:24

## 2024-02-13 RX ADMIN — CYCLOBENZAPRINE 10 MG: 10 TABLET, FILM COATED ORAL at 21:05

## 2024-02-13 RX ADMIN — SENNOSIDES AND DOCUSATE SODIUM 1 TABLET: 8.6; 5 TABLET ORAL at 08:23

## 2024-02-13 RX ADMIN — HYDROMORPHONE HYDROCHLORIDE 0.4 MG: 0.2 INJECTION, SOLUTION INTRAMUSCULAR; INTRAVENOUS; SUBCUTANEOUS at 04:35

## 2024-02-13 RX ADMIN — HYDROXYCHLOROQUINE SULFATE 200 MG: 200 TABLET ORAL at 20:17

## 2024-02-13 RX ADMIN — BUSPIRONE HYDROCHLORIDE 30 MG: 15 TABLET ORAL at 08:27

## 2024-02-13 RX ADMIN — POLYETHYLENE GLYCOL 3350 17 G: 17 POWDER, FOR SOLUTION ORAL at 08:23

## 2024-02-13 RX ADMIN — FAMOTIDINE 20 MG: 10 INJECTION, SOLUTION INTRAVENOUS at 08:24

## 2024-02-13 RX ADMIN — METHOCARBAMOL 750 MG: 750 TABLET ORAL at 06:25

## 2024-02-13 RX ADMIN — AZELASTINE HYDROCHLORIDE 2 SPRAY: 137 SPRAY, METERED NASAL at 08:28

## 2024-02-13 RX ADMIN — OXYCODONE HYDROCHLORIDE 15 MG: 5 TABLET ORAL at 17:38

## 2024-02-13 RX ADMIN — ACETAMINOPHEN 975 MG: 325 TABLET, FILM COATED ORAL at 06:25

## 2024-02-13 RX ADMIN — PANTOPRAZOLE SODIUM 40 MG: 40 TABLET, DELAYED RELEASE ORAL at 20:16

## 2024-02-13 RX ADMIN — BUSPIRONE HYDROCHLORIDE 30 MG: 15 TABLET ORAL at 20:17

## 2024-02-13 RX ADMIN — METHOCARBAMOL 750 MG: 750 TABLET ORAL at 00:20

## 2024-02-13 RX ADMIN — ACETAMINOPHEN 975 MG: 325 TABLET, FILM COATED ORAL at 13:37

## 2024-02-13 RX ADMIN — HYDROMORPHONE HYDROCHLORIDE 0.4 MG: 0.2 INJECTION, SOLUTION INTRAMUSCULAR; INTRAVENOUS; SUBCUTANEOUS at 15:04

## 2024-02-13 RX ADMIN — FAMOTIDINE 20 MG: 20 TABLET ORAL at 20:16

## 2024-02-13 RX ADMIN — OXYCODONE HYDROCHLORIDE 10 MG: 5 TABLET ORAL at 09:00

## 2024-02-13 RX ADMIN — METHOCARBAMOL 750 MG: 750 TABLET ORAL at 20:24

## 2024-02-13 RX ADMIN — ARIPIPRAZOLE 5 MG: 5 TABLET ORAL at 08:28

## 2024-02-13 RX ADMIN — MONTELUKAST 10 MG: 10 TABLET, FILM COATED ORAL at 21:05

## 2024-02-13 ASSESSMENT — ACTIVITIES OF DAILY LIVING (ADL)
ADLS_ACUITY_SCORE: 30
ADLS_ACUITY_SCORE: 30
ADLS_ACUITY_SCORE: 28
ADLS_ACUITY_SCORE: 31
ADLS_ACUITY_SCORE: 28
ADLS_ACUITY_SCORE: 31
ADLS_ACUITY_SCORE: 28
ADLS_ACUITY_SCORE: 30

## 2024-02-13 NOTE — CARE PLAN
"Shift: 1900 - 0730    /87 (BP Location: Right arm)   Pulse 74   Temp 98.2  F (36.8  C) (Oral)   Resp 18   Ht 1.626 m (5' 4.02\")   Wt 85.2 kg (187 lb 13.3 oz)   LMP  (LMP Unknown)   SpO2 100%   BMI 32.23 kg/m      Patient is, AOX4 no intermittent confusion No acute change or events; VSS, Denies shortness of breath,chest pain, numbness/tingling, nausea/vomiting,no fever or chills. Pt reports pain - managed with schedule tylenol with PRN dilaudid, and Robaxin for muscles spasm. POC ongoing.    "

## 2024-02-13 NOTE — PROGRESS NOTES
S: Patient was seen today at   for an eval for a cervical collar as ordered by     O/G: The objective/goal of the collar is to help reduce motion/give cervical stability.    A: Pt was fit with a [place] J cervical collar, size 250 front and 300 rear.  Starting with the anterior section, the correct size and height was chosen that supported the patient in the desired position.  Attention was given to the chin support being sure that the correct height was selected to support the chin.   The posterior section was centered on the patients head .  With the side closure straps extending equally on both sides, the Velcro was secured.  An extra padding set was also provided.  Patient instructed on donning, doffing, use and care    P: Patient has been instructed to contact our facility with any questions and/or concerns.   JOAN Ann.

## 2024-02-13 NOTE — PROGRESS NOTES
CLINICAL NUTRITION SERVICES - ASSESSMENT NOTE     Nutrition Prescription    RECOMMENDATIONS FOR MDs/PROVIDERS TO ORDER:  None    Malnutrition Status:    Patient does not meet two of the established criteria necessary for diagnosing    Recommendations already ordered by Registered Dietitian (RD):  Hawk ISIDRO, per MD order  Ensure Max daily    Future/Additional Recommendations:  Monitor labs, weight trends, intakes and supplement tolerance       REASON FOR ASSESSMENT  Zayra Ramirez is a 54 year old female assessed by the dietitian for Provider Order - Patient needs high-protein education and ordering of preferred supplements at 1.5 g protein per kg body weight    Reason for admission:  planned cervical spinal fusion of C2-5 and laminectomies of cervical 3-4 for cervical radiculopathy and cervical spondylosis 2/12  PMH: asthma, ILD, seronegative inflammatory arthritis, ROBERT, HTN, GERD     NUTRITION HISTORY  Pt follows low carbohydrate high protein diet at home. Pt consumes 2 Premier protein shakes daily (30 gms protein, 160 kcals per shake).    CURRENT NUTRITION ORDERS  Diet: Regular  Intake/Tolerance: Just eating breakfast now, reports a little swallowing issue.    LABS  Labs reviewed    MEDICATIONS  Medications reviewed  Current Facility-Administered Medications   Medication    [START ON 2/15/2024] acetaminophen (TYLENOL) tablet 650 mg    acetaminophen (TYLENOL) tablet 975 mg    albuterol (PROVENTIL HFA/VENTOLIN HFA) inhaler    ARIPiprazole (ABILIFY) tablet 5 mg    azelastine (ASTELIN) nasal spray 2 spray    benzocaine-menthol (CHLORASEPTIC) 6-10 MG lozenge 1 lozenge    bisacodyl (DULCOLAX) suppository 10 mg    busPIRone (BUSPAR) tablet 30 mg    cloNIDine (CATAPRES) tablet 0.1 mg    cyclobenzaprine (FLEXERIL) tablet 10 mg    DULoxetine (CYMBALTA) DR capsule 120 mg    famotidine (PEPCID) tablet 20 mg    Or    famotidine (PEPCID) injection 20 mg    hydroCHLOROthiazide (HYDRODIURIL) tablet 12.5 mg    HYDROmorphone (PF)  "(DILAUDID) injection 0.2 mg    Or    HYDROmorphone (DILAUDID) injection 0.4 mg    hydroxychloroquine (PLAQUENIL) tablet 200 mg    lidocaine (LMX4) cream    lidocaine 1 % 0.1-1 mL    lidocaine 2g/250 mL D5W (ADULT STD PREMIX) ANALGESIA    lisinopril (ZESTRIL) tablet 10 mg    magnesium hydroxide (MILK OF MAGNESIA) suspension 30 mL    methocarbamol (ROBAXIN) tablet 750 mg    montelukast (SINGULAIR) tablet 10 mg    naloxone (NARCAN) injection 0.2 mg    Or    naloxone (NARCAN) injection 0.4 mg    Or    naloxone (NARCAN) injection 0.2 mg    Or    naloxone (NARCAN) injection 0.4 mg    ondansetron (ZOFRAN ODT) ODT tab 4 mg    Or    ondansetron (ZOFRAN) injection 4 mg    oxyCODONE (ROXICODONE) tablet 10 mg    Or    oxyCODONE (ROXICODONE) tablet 15 mg    pantoprazole (PROTONIX) EC tablet 40 mg    polyethylene glycol (MIRALAX) Packet 17 g    pregabalin (LYRICA) capsule 150 mg    pregabalin (LYRICA) capsule 150 mg    prochlorperazine (COMPAZINE) injection 10 mg    Or    prochlorperazine (COMPAZINE) tablet 10 mg    rOPINIRole (REQUIP) tablet 0.5 mg    senna-docusate (SENOKOT-S/PERICOLACE) 8.6-50 MG per tablet 1 tablet    sodium chloride (PF) 0.9% PF flush 3 mL    sodium chloride (PF) 0.9% PF flush 3 mL    sodium chloride 0.9 % infusion     Facility-Administered Medications Ordered in Other Encounters   Medication    Lidocaine 1 % injection 0.5-5 mL    sodium chloride (PF) 0.9% PF flush 5-50 mL       ANTHROPOMETRICS  Height: 162.6 cm (5' 4.016\")  Most Recent Weight: 85.2 kg (187 lb 13.3 oz)    IBW: 54.5 kg  Body mass index is 32.23 kg/m .  BMI: Obesity Grade I BMI 30-34.9  Weight History: No clinically significant wt loss noted  Wt Readings from Last 15 Encounters:   02/12/24 85.2 kg (187 lb 13.3 oz)   01/16/24 86.2 kg (190 lb)   10/23/23 86.2 kg (190 lb)   10/12/23 86.6 kg (191 lb)   09/20/23 86.9 kg (191 lb 8 oz)   04/21/23 91.2 kg (201 lb)   03/14/23 91.2 kg (201 lb)   03/02/23 90.5 kg (199 lb 8 oz)   02/16/23 91.2 kg (201 lb) "   01/10/23 88.9 kg (196 lb)   12/22/22 87.1 kg (192 lb)   12/03/22 87.5 kg (192 lb 14.4 oz)   11/08/22 89.4 kg (197 lb 3.2 oz)   10/27/22 93.9 kg (207 lb)   09/07/22 93.9 kg (207 lb)     Dosing Weight: IBW for protein, Actual Body Weight for kcals 54.5 kg, 85.2 kg    ASSESSED NUTRITION NEEDS  Estimated Energy Needs: 1195 - 1450 kcals/day (14 - 17 kcals/kg)  Justification: Obese  Estimated Protein Needs: 82 - 109 grams protein/day (1.5 - 2 grams of pro/kg)  Justification: Obesity guidelines  Estimated Fluid Needs: 1500 mL/day (1 mL/kcal)   Justification: Maintenance    PHYSICAL FINDINGS  See malnutrition section below.  No abnormal nutrition-related physical findings observed.   Harmeet: no score  GI: Last BM pta  Bowel regimen: Scheduled  GI concerns reported  None    MALNUTRITION  % Intake: No decreased intake noted  % Weight Loss: None noted  Subcutaneous Fat Loss: None observed  Muscle Loss: None observed  Fluid Accumulation/Edema: None noted  Malnutrition Diagnosis: Patient does not meet two of the established criteria necessary for diagnosing malnutrition    NUTRITION DIAGNOSIS  Increased nutrient needs protein related to recent surgery/wound healing as evidenced by s/p surgery 2/12      INTERVENTIONS  Implementation  Medical food supplement therapy     Goals  Patient to consume % of nutritionally adequate meal trays TID, or the equivalent with supplements/snacks.     Monitoring/Evaluation  Progress toward goals will be monitored and evaluated per protocol.  Thalia Roth RDN [facility] 5B/10[address] ICU RD pager: [phone]   On Call Pager (weekends only): [phone]

## 2024-02-13 NOTE — PLAN OF CARE
Goal Outcome Evaluation:       Vitals: vitally stable (see flowsheet)    Activity: not out of bed    /GI: last BM today. Has a urinary catheter.     LDAs: hemovac (emptied, see flowsheet), 2 PIVs right and left lower arm. Tear on left lower arm due to surgical positioning.     Pain: has continuous lidocaine infusing, offered Prn methocarbamol and dilaudid.     Other:   Continue plan of care

## 2024-02-13 NOTE — PROGRESS NOTES
[facility]    Medicine Progress Note - Hospitalist Service, GOLD TEAM 17    Date of Admission:  2/12/2024    Assessment & Plan   A: Patient is a 53 y/o woman who has a past medical history significant for asthma, interstitial lung disease, seronegative inflammatory arthritis, obstructive sleep apnea, hypertension and GERD. Patient underwent posterior instrumented spinal fusion C2-5, laminectomies C3-4, use of local autograft and crushed cancellous allograft on 12-Feb-2024 for cervical pseudoarthrosis, cervical radiculopathy and cervical stenosis with myelopathy.    P:  1.) Cervical pseudoarthrosis, cervical radiculopathy and cervical stenosis with myelopathy s/p surgical intervention:  - Patient on scheduled acetaminophen. Patient on acetaminophen, IV hydromorphone and oxycodone as needed.    2.) Hypoxemia:  - Oxygen support as needed.    3.) Hypertension:  - Controlled on hydrochlorothiazide and lisinopril.   - Monitoring.    4.) Interstitial lung disease:  - To f/u with Pulmonology as outpatient for further management.    5.) Moderate persistent asthma, uncomplicated:  - Monitoring for symptoms.    6.) Restless legs syndrome:  - Patient on requip and lyrica.    7.) Bipolar disorder; depression:  - Patient on abilify, duloxetine and buspar.    8.) GERD:  - Patient on PPI.    9.) Seronegative inflammatory arthritis:  - Patient on hydroxychloroquine.   - To f/u with Rheumatology as outpatient.    10.) Obstructive sleep apnea:  - Patient using CPAP.          Diet: Advance Diet as Tolerated: Regular Diet Adult  Snacks/Supplements Adult: Hawk; With Meals  Snacks/Supplements Adult: Ensure Max Protein (bariatric); With Meals    Hicks Catheter: Not present  Lines: None     Cardiac Monitoring: None  Code Status: Full Code      Clinically Significant Risk Factors                  # Hypertension: Noted on problem list        # Obesity: Estimated body mass index is 32.23 kg/m  as  "calculated from the following:    Height as of this encounter: 1.626 m (5' 4.02\").    Weight as of this encounter: 85.2 kg (187 lb 13.3 oz)., PRESENT ON ADMISSION     # Asthma: noted on problem list        Disposition Plan     Expected Discharge Date: 02/13/2024                    Moody Bernstein MD  Hospitalist Service, GOLD TEAM 17  [facility]  Securely message with [email or web address] (more info)  Text page via [email or web address] Paging/Directory   See signed in provider for up to date coverage information  ______________________________________________________________________    Interval History   Patient noted discomfort at surgical site. Patient noted no other pain. Patient noted no wheezing. Patient noted no fever and no chills.    Physical Exam   Vital Signs: Temp: 98.4  F (36.9  C) Temp src: Oral BP: 125/71 Pulse: 71   Resp: 18 SpO2: 95 % O2 Device: Nasal cannula Oxygen Delivery: 2 LPM  Weight: 187 lbs 13.31 oz    General: Patient comfortable, NAD.  Heart: RRR, S1 S2 w/o murmurs.  Lungs: Breath sounds present. No crackles/wheezes heard.  Abdomen: Soft, nontender.    Labs noted.  Sodium 139; Potassium 4.0; Creatinine 0.69;   Hb 11.6;      Medical Decision Making           "

## 2024-02-13 NOTE — PROGRESS NOTES
"   02/13/24 1100   Appointment Info   Signing Clinician's Name / Credentials (PT) Vannessa Rodriguez, DPT   Living Environment   People in Home significant other   Current Living Arrangements house   Home Accessibility stairs to enter home   Number of Stairs, Main Entrance 2   Stair Railings, Main Entrance none   Transportation Anticipated family or friend will provide   Living Environment Comments all needs on main floor once inside, walk in shower with seat, 2 GENESIS no rail. Fiance to assist as needed, while he is at work mother in law will come over as much as needed   Self-Care   Usual Activity Tolerance excellent   Current Activity Tolerance moderate   Regular Exercise No   Equipment Currently Used at Home none;raised toilet seat;shower chair;walker, rolling;dressing device  (does not use at baseline, has from previous spine surgery)   Fall history within last six months yes   Number of times patient has fallen within last six months 1   Activity/Exercise/Self-Care Comment IND at baseline with all cares and mobility. 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   General Information   Onset of Illness/Injury or Date of Surgery 02/12/24   Referring Physician Dr. Wilhelm   Pertinent History of Current Problem (include personal factors and/or comorbidities that impact the POC) per chart review, \"Zayra Ramirez is a 54 year old female s/p C2-C5 posterior instrumented fusion with C3 and C4 laminectomies on 2/12/24 with Dr. Wilhelm.\"   Existing Precautions/Restrictions spinal   Weight-Bearing Status - LUE   (10#)   Weight-Bearing Status - RUE   (10#)   Weight-Bearing Status - LLE weight-bearing as tolerated   Weight-Bearing Status - RLE weight-bearing as tolerated   General Observations [facility] for sleep only, [facility] in place   Cognition   Affect/Mental Status (Cognition) WNL   Orientation Status (Cognition) oriented x 4   Follows Commands (Cognition) WNL   Pain Assessment   Patient Currently in Pain Yes, see Vital Sign flowsheet "   Integumentary/Edema   Integumentary/Edema no deficits were identifed   Posture    Posture Kyphosis;Protracted shoulders   Range of Motion (ROM)   ROM Comment WNL   Strength (Manual Muscle Testing)   Strength Comments B LEs mild generalized weakness, but all extremeties WFL   Bed Mobility   Comment, (Bed Mobility) Judi supine>sit to EOB with rail use and HOB levated   Transfers   Comment, (Transfers) Hand hold assist to stand/Marisela, slight posterior lean initially but stabilizes quickly   Gait/Stairs (Locomotion)   Comment, (Gait/Stairs) ambulates in room with L HHA and R IV support- shuffled steps, slow gait, balance fair with B UE support   Balance   Balance Comments mild initial impairments post op but not OOB at all yet, IND sitting   Sensory Examination   Sensory Perception patient reports no sensory changes   Clinical Impression   Criteria for Skilled Therapeutic Intervention Yes, treatment indicated   PT Diagnosis (PT) impaired functional mobility   Influenced by the following impairments c-spine precautions, reduced activity tolerance, generlized weakness, impaired balance, [place] J   Functional limitations due to impairments transfers, gait, stairs, ADLs   Clinical Presentation (PT Evaluation Complexity) stable   Clinical Presentation Rationale PMH, clinician impression   Clinical Decision Making (Complexity) low complexity   Planned Therapy Interventions (PT) balance training;bed mobility training;gait training;home exercise program;neuromuscular re-education;patient/family education;stair training;strengthening;postural re-education;transfer training;risk factor education;progressive activity/exercise;home program guidelines   Risk & Benefits of therapy have been explained evaluation/treatment results reviewed;risks/benefits reviewed;care plan/treatment goals reviewed;current/potential barriers reviewed;participants included;patient   Clinical Impression Comments mobilizing well POD 1, pt motivated, good  family support at discharge, pain well controlled   PT Total Evaluation Time   PT Eval, Low Complexity Minutes (79164) 9   Physical Therapy Goals   PT Frequency Daily   PT Predicted Duration/Target Date for Goal Attainment 02/15/24   PT Goals Bed Mobility;Transfers;Gait;Stairs   PT: Bed Mobility Independent;Supine to/from sit;Within precautions   PT: Transfers Modified independent;Sit to/from stand;Bed to/from chair;Assistive device;Within precautions   PT: Gait Modified independent;Rolling walker;Greater than 200 feet   PT: Stairs Minimal assist;2 stairs  (no rail)   Interventions   Interventions Quick Adds Gait Training;Therapeutic Activity   Therapeutic Activity   Therapeutic Activities: dynamic activities to improve functional performance Minutes (74237) 15   Symptoms Noted During/After Treatment Fatigue;Increased pain   Treatment Detail/Skilled Intervention educated on c-spine mobility and activity implications as well as brace fit and wear recomendations. PT adjusting brace slightly tighter as some play and pt reports feeling loose. OT screen and brief review of DME for home and ADLs- pt had spine surgery little over one year ago so remember much of teachign for spine precatuions, able to demo figure 4 positioning, has reacher, sock aide, shower chair, planning to wear button shirts and slippers. Pt with no concerns for ADLs once confirmed restrictions largely the same, denies need for OT ocnsult and PT in agreement. After gait, encouraged up to chair, sueprvision for postoning, pillows placed for comfort and to improve sitting tolerance. PT's VSS on RA throughout session (only had for sleeping) so left on RA end of session. Encouraged increasd activity, x3 more walks with ursing today with walker, pt agreeable to plan.   Gait Training   Gait Training Minutes (31604) 9   Symptoms Noted During/After Treatment (Gait Training) fatigue;increased pain   Treatment Detail/Skilled Intervention goal to progress gait  stability- pt ambulates with B UE IV support and CGA x200', vc for posture as pt reports feeling brace pulling her forward. Overall slow but steady. PT counseled on benfeit of FWW currently and pt in agreement, PT obtained and placed in pt room for use with nursing. Stair trial tomorrow.   PT Discharge Planning   PT Plan amb FWW vs no AD (likely will use FWW at home), stairs, flat bed mobility   PT Discharge Recommendation (DC Rec) home with assist   PT Rationale for DC Rec pt mobilizing well overall POD 1, good family support, she is up Ax1, fair stability and activity tolerance. Anticipate PT goals met in 1-2 days for home. Likely benefit from OP PT once cleared at post op visit   PT Brief overview of current status SBA with FWW   Total Session Time   Timed Code Treatment Minutes 24   Total Session Time (sum of timed and untimed services) 33

## 2024-02-13 NOTE — PLAN OF CARE
Occupational Therapy: Orders received. Chart reviewed and discussed with care team.? Occupational Therapy not indicated due to pt able to complete basic ADL and has all needed AE. Pt had previous spinal surgery and is familiar with precautions and compensatory strategies. Pt has assist as needed for IADL.? Defer discharge recommendations to physical therapy.? Will complete orders.

## 2024-02-13 NOTE — PROGRESS NOTES
"Orthopedic Surgery Progress Note:     Subjective:   Doing well this AM. Denies numbness and tingling. Does report some neck pain overnight. Tolerating PO. Janeth PADRON fitted yesterday.     Objective:   /87 (BP Location: Right arm)   Pulse 74   Temp 98.2  F (36.8  C) (Oral)   Resp 18   Ht 1.626 m (5' 4.02\")   Wt 85.2 kg (187 lb 13.3 oz)   LMP  (LMP Unknown)   SpO2 100%   BMI 32.23 kg/m    I/O this shift:  In: -   Out: 530 [Urine:500; Drains:30]  General: NAD. Resting comfortably in bed.  Respiratory: Breathing comfortably on RA.  Drain Output: 50 mL overnigth   Musculoskeletal: janeth mele in place     Motor Strength Right Left   Deltoids: C5 5/5 5/5   Biceps: C5 5/5 5/5   Brachialis: C6 5/5 5/5   Wrist extension: C6 5/5 5/5   Triceps: C7  5/5 5/5   Wrist flexion: C7 5/5 5/5    strength: C8 5/5 5/5   Hand intrinsics: T1 5/5 5/5     Sensation from C4-L1 is preserved.        Laboratory Data:  Lab Results   Component Value Date    WBC 5.7 01/16/2024    HGB 12.7 01/16/2024     01/16/2024    INR 1.09 08/18/2019       Images:  No new images were obtained.    Assessment & Plan:   aZyra Ramirez is a 54 year old female s/p C2-C5 posterior instrumented fusion with C3 and C4 laminectomies on 2/12/24 with Dr. Wilhelm.     Goals for today:  PT/OT   Remove Hicks   Pain control   Monitor Drains     Spine Primary  - Activity: Up with assist until independent. No excessive bending or twisting. No lifting >10 lbs x 6 weeks. No James lift for transfers.   - Weightbearing Status: WBAT.  - Pain Management: Transition from IV to PO as tolerated. No NSAIDs.   - Antibiotics: Ancef 1 g Q8H x24 hours   - Diet: Begin with clear fluids and progress diet as tolerated.   - DVT Prophylaxis: SCDs only. No chemical DVT prophylaxis needed.  - Imaging: XR Upright PTDC; ordered.  - Labs: Labs PRN.  - Bracing/Splinting: [place] J  - Dressings: Keep Aquacel C/D/I x 7 days.  - Drains: Document output per shift; will be discontinued at " orthopedic surgery discretion.  - Hicks catheter: remove today   - Physical Therapy/Occupational Therapy: Evaluation and treatment.  - Consults: Hospitalist.  - Follow-up: Clinic with Dr. Wilhelm in 6 weeks with repeat radiographs.    - Disposition: Pending progress with therapies, pain control on orals, and medical stability; anticipate discharge to home on POD3-4.    Orthopedic surgery staff for this patient is Dr. Wilhelm.    ------------------------------------------------------------------------------------------    [   ] PCA discontinued.  [   ] Drains discontinued.  [   ] Postoperative radiographs complete.  [   ] Discharge orders complete.  [   ] Discharge summary started.    David Barrett MD  Spine Fellow     PLEASE PAGE ME directly with any questions/concerns during regular weekday hours before 5 pm. If there is no response, if it is a weekend, or if it is during evening hours then please page the orthopedic surgery resident on call.    FOLLOWUP:    Future Appointments   Date Time Provider Department Center   2/13/2024  9:30 AM Vannessa Rodriguez, PT URPT [place]   2/13/2024  7:00 PM Crys Hong, OT UROT [place]   2/20/2024 10:00 AM Dorothy Hong MD [facility]   2/29/2024 10:00 AM Panchito Saenz MD [facility]   3/6/2024  9:00 AM Sarika Palumbo RD MDBI [facility]   4/2/2024  9:00 AM Philip Wilhelm MD [facility]

## 2024-02-13 NOTE — PHARMACY-ADMISSION MEDICATION HISTORY
Pharmacist Admission Medication History    Admission medication history is complete. The information provided in this note is only as accurate as the sources available at the time of the update.    Information Source(s): Patient, Patient's pharmacy, Clinic records, and [facility]     Pertinent Information:   Last doses of medications recorded by pre-op RN     Changes made to PTA medication list:  Added: None  Deleted: Bactrim, methotrexate  Changed: None    Allergies reviewed with patient and updates made in EHR: no    Medication History Completed By: [facility] 2/12/2024 8:45 PM    PTA Med List   Medication Sig Last Dose    albuterol (PROAIR HFA/PROVENTIL HFA/VENTOLIN HFA) 108 (90 Base) MCG/ACT inhaler Inhale 2 puffs into the lungs every 6 hours as needed for shortness of breath / dyspnea or wheezing Ventolin please 2/12/2024 at 0430    albuterol (PROVENTIL) (2.5 MG/3ML) 0.083% neb solution INHALE 3 ML VIA A NEBULIZER 4 TIMES DAILY IF NEEDED. Past Month    ARIPiprazole (ABILIFY) 5 MG tablet Take 5 mg by mouth every morning 2/12/2024 at 0430    azelastine (ASTELIN) 0.1 % nasal spray Spray 2 sprays in nostril 2 times daily 2/12/2024 at 0430    Black Pepper-Turmeric (TURMERIC CURCUMIN) 5-1000 MG CAPS Take 1 capsule by mouth daily Unknown    busPIRone HCl (BUSPAR) 30 MG tablet Take 30 mg by mouth 2 times daily  2/12/2024 at 0430    carboxymethylcellulose (CARBOXYMETHYLCELLULOSE SODIUM) 0.5 % SOLN ophthalmic solution Place 1 drop into both eyes 3 times daily as needed for dry eyes Past Month    celecoxib (CELEBREX) 200 MG capsule Take 1 capsule (200 mg) by mouth every morning Past Week    cloNIDine (CATAPRES) 0.1 MG tablet Take 1 tablet by mouth as needed Past Month    cyclobenzaprine (FLEXERIL) 10 MG tablet TAKE 1 TABLET BY MOUTH EVERYDAY AT BEDTIME 2/11/2024 at 2000    DULoxetine (CYMBALTA) 60 MG capsule Take 120 mg by mouth At Bedtime  2/11/2024 at 2000    EPINEPHrine (ANY BX GENERIC EQUIV)  0.3 MG/0.3ML injection 2-pack Inject 0.3 mLs (0.3 mg) into the muscle as needed for anaphylaxis May repeat one time in 5-15 minutes if response to initial dose is inadequate. Unknown    ferrous fumarate 65 mg, [place]. FE,-Vitamin C 125 mg (VITRON C)  MG TABS tablet Take 1 tablet by mouth daily before breakfast Past Month    folic acid (FOLVITE) 1 MG tablet Take 1 tablet (1 mg) by mouth daily (Patient taking differently: Take 1 mg by mouth every evening) Past Month    hydroxychloroquine (PLAQUENIL) 200 MG tablet Take 1 tablet (200 mg) by mouth 2 times daily 2/12/2024 at 0430    insulin pen needle (32G X 4 MM) 32G X 4 MM miscellaneous Use 1 NEEDLE WEEKLY Past Week    liraglutide - Weight Management (SAXENDA) 18 MG/3ML pen INJECT 3 MG SUBCUTANEOUS DAILY FOR 90 DAYS More than a month    lisinopril-hydrochlorothiazide (ZESTORETIC) 20-25 MG tablet Take 0.5 tablets by mouth daily (Patient taking differently: Take 0.5 tablets by mouth every morning) Past Week    montelukast (SINGULAIR) 10 MG tablet Take 1 tablet by mouth At Bedtime 2/11/2024 at 2000    omeprazole (PRILOSEC) 40 MG DR capsule TAKE 1 CAPSULE BY MOUTH EVERY DAY (Patient taking differently: Take 40 mg by mouth every evening) 2/11/2024 at 2000    OXYGEN-HELIUM IN 3L @ night    Oxygen only not helium 2/11/2024 at 2000    pregabalin (LYRICA) 150 MG capsule Take 1 capsule by mouth in the morning and around 1 PM (Patient taking differently: Take 150 mg by mouth 3 times daily Take 1 capsule by mouth in the morning and around 1 PM) 2/12/2024 at 0430    Respiratory Therapy Supplies ([facility] CPAP FILTER) MISC  Unknown    rOPINIRole (REQUIP) 0.5 MG tablet Take 1 tablet (0.5 mg) by mouth At Bedtime 2/11/2024 at 2000    STIOLTO RESPIMAT 2.5-2.5 MCG/ACT AERS Inhale 2 puffs into the lungs every morning 2/11/2024 at 0430    tirzepatide-Weight Management (ZEPBOUND) 2.5 MG/0.5ML prefilled pen Inject 0.5 mLs (2.5 mg) Subcutaneous every 7 days More than a month     UNABLE TO FIND CPAP machine for home use at pressure: 11 cms water with oxygen at 3 lpm , nasal mask x1/3month with nasal cushion x2/mo Length of Need: 99 months, Frequency of use: Daily Unknown    vitamin D3 (CHOLECALCIFEROL) 250 mcg (25265 units) capsule Take 1 capsule by mouth every morning Past Week    vitamin E (TOCOPHEROL) 1000 units (450 mg) CAPS capsule Take 1,000 Units by mouth every morning Past Week

## 2024-02-14 ENCOUNTER — APPOINTMENT (OUTPATIENT)
Dept: SPEECH THERAPY | Facility: CLINIC | Age: 54
DRG: 472 | End: 2024-02-14
Attending: NURSE PRACTITIONER
Payer: COMMERCIAL

## 2024-02-14 ENCOUNTER — APPOINTMENT (OUTPATIENT)
Dept: PHYSICAL THERAPY | Facility: CLINIC | Age: 54
DRG: 472 | End: 2024-02-14
Attending: ORTHOPAEDIC SURGERY
Payer: COMMERCIAL

## 2024-02-14 LAB
ANION GAP SERPL CALCULATED.3IONS-SCNC: 8 MMOL/L (ref 7–15)
BUN SERPL-MCNC: 14.5 MG/DL (ref 6–20)
CALCIUM SERPL-MCNC: 8.4 MG/DL (ref 8.6–10)
CHLORIDE SERPL-SCNC: 103 MMOL/L (ref 98–107)
CREAT SERPL-MCNC: 0.67 MG/DL (ref 0.51–0.95)
DEPRECATED HCO3 PLAS-SCNC: 30 MMOL/L (ref 22–29)
EGFRCR SERPLBLD CKD-EPI 2021: >90 ML/MIN/1.73M2
GLUCOSE SERPL-MCNC: 100 MG/DL (ref 70–99)
POTASSIUM SERPL-SCNC: 4.1 MMOL/L (ref 3.4–5.3)
SODIUM SERPL-SCNC: 141 MMOL/L (ref 135–145)

## 2024-02-14 PROCEDURE — 80048 BASIC METABOLIC PNL TOTAL CA: CPT | Performed by: ORTHOPAEDIC SURGERY

## 2024-02-14 PROCEDURE — 97530 THERAPEUTIC ACTIVITIES: CPT | Mod: GP

## 2024-02-14 PROCEDURE — 92610 EVALUATE SWALLOWING FUNCTION: CPT | Mod: GN

## 2024-02-14 PROCEDURE — 250N000011 HC RX IP 250 OP 636: Performed by: NURSE PRACTITIONER

## 2024-02-14 PROCEDURE — 99232 SBSQ HOSP IP/OBS MODERATE 35: CPT | Performed by: INTERNAL MEDICINE

## 2024-02-14 PROCEDURE — 250N000013 HC RX MED GY IP 250 OP 250 PS 637: Performed by: ORTHOPAEDIC SURGERY

## 2024-02-14 PROCEDURE — 36415 COLL VENOUS BLD VENIPUNCTURE: CPT | Performed by: ORTHOPAEDIC SURGERY

## 2024-02-14 PROCEDURE — 250N000013 HC RX MED GY IP 250 OP 250 PS 637: Performed by: NURSE PRACTITIONER

## 2024-02-14 PROCEDURE — 97116 GAIT TRAINING THERAPY: CPT | Mod: GP

## 2024-02-14 PROCEDURE — 120N000002 HC R&B MED SURG/OB UMMC

## 2024-02-14 PROCEDURE — 250N000013 HC RX MED GY IP 250 OP 250 PS 637: Performed by: PHYSICIAN ASSISTANT

## 2024-02-14 PROCEDURE — 250N000013 HC RX MED GY IP 250 OP 250 PS 637

## 2024-02-14 PROCEDURE — 92526 ORAL FUNCTION THERAPY: CPT | Mod: GN

## 2024-02-14 RX ORDER — DIPHENHYDRAMINE HYDROCHLORIDE 50 MG/ML
25 INJECTION INTRAMUSCULAR; INTRAVENOUS EVERY 6 HOURS PRN
Status: DISCONTINUED | OUTPATIENT
Start: 2024-02-14 | End: 2024-02-16 | Stop reason: HOSPADM

## 2024-02-14 RX ORDER — METHOCARBAMOL 750 MG/1
750 TABLET, FILM COATED ORAL EVERY 6 HOURS
Status: DISCONTINUED | OUTPATIENT
Start: 2024-02-14 | End: 2024-02-15

## 2024-02-14 RX ORDER — DIPHENHYDRAMINE HCL 25 MG
25 CAPSULE ORAL EVERY 6 HOURS PRN
Status: DISCONTINUED | OUTPATIENT
Start: 2024-02-14 | End: 2024-02-16 | Stop reason: HOSPADM

## 2024-02-14 RX ORDER — KETOROLAC TROMETHAMINE 15 MG/ML
15 INJECTION, SOLUTION INTRAMUSCULAR; INTRAVENOUS EVERY 6 HOURS PRN
Status: DISCONTINUED | OUTPATIENT
Start: 2024-02-14 | End: 2024-02-15

## 2024-02-14 RX ADMIN — MONTELUKAST 10 MG: 10 TABLET, FILM COATED ORAL at 21:29

## 2024-02-14 RX ADMIN — ACETAMINOPHEN 975 MG: 325 TABLET, FILM COATED ORAL at 21:30

## 2024-02-14 RX ADMIN — PREGABALIN 150 MG: 75 CAPSULE ORAL at 12:08

## 2024-02-14 RX ADMIN — KETOROLAC TROMETHAMINE 15 MG: 15 INJECTION, SOLUTION INTRAMUSCULAR; INTRAVENOUS at 09:50

## 2024-02-14 RX ADMIN — HYDROCHLOROTHIAZIDE 12.5 MG: 12.5 TABLET ORAL at 08:11

## 2024-02-14 RX ADMIN — SENNOSIDES AND DOCUSATE SODIUM 1 TABLET: 8.6; 5 TABLET ORAL at 08:11

## 2024-02-14 RX ADMIN — BUSPIRONE HYDROCHLORIDE 30 MG: 15 TABLET ORAL at 08:11

## 2024-02-14 RX ADMIN — AZELASTINE HYDROCHLORIDE 2 SPRAY: 137 SPRAY, METERED NASAL at 08:17

## 2024-02-14 RX ADMIN — KETOROLAC TROMETHAMINE 15 MG: 15 INJECTION, SOLUTION INTRAMUSCULAR; INTRAVENOUS at 19:25

## 2024-02-14 RX ADMIN — FAMOTIDINE 20 MG: 20 TABLET ORAL at 21:28

## 2024-02-14 RX ADMIN — CYCLOBENZAPRINE 10 MG: 10 TABLET, FILM COATED ORAL at 21:29

## 2024-02-14 RX ADMIN — OXYCODONE HYDROCHLORIDE 15 MG: 5 TABLET ORAL at 21:27

## 2024-02-14 RX ADMIN — METHOCARBAMOL 750 MG: 750 TABLET ORAL at 21:33

## 2024-02-14 RX ADMIN — ACETAMINOPHEN 975 MG: 325 TABLET, FILM COATED ORAL at 14:37

## 2024-02-14 RX ADMIN — PANTOPRAZOLE SODIUM 40 MG: 40 TABLET, DELAYED RELEASE ORAL at 21:30

## 2024-02-14 RX ADMIN — POLYETHYLENE GLYCOL 3350 17 G: 17 POWDER, FOR SOLUTION ORAL at 08:12

## 2024-02-14 RX ADMIN — OXYCODONE HYDROCHLORIDE 15 MG: 5 TABLET ORAL at 16:22

## 2024-02-14 RX ADMIN — METHOCARBAMOL 750 MG: 750 TABLET ORAL at 09:43

## 2024-02-14 RX ADMIN — OXYCODONE HYDROCHLORIDE 15 MG: 5 TABLET ORAL at 08:11

## 2024-02-14 RX ADMIN — OXYCODONE HYDROCHLORIDE 15 MG: 5 TABLET ORAL at 12:07

## 2024-02-14 RX ADMIN — ARIPIPRAZOLE 5 MG: 5 TABLET ORAL at 08:11

## 2024-02-14 RX ADMIN — DIPHENHYDRAMINE HYDROCHLORIDE 25 MG: 25 CAPSULE ORAL at 21:33

## 2024-02-14 RX ADMIN — ROPINIROLE HYDROCHLORIDE 0.5 MG: 0.25 TABLET, FILM COATED ORAL at 21:29

## 2024-02-14 RX ADMIN — DULOXETINE HYDROCHLORIDE 120 MG: 60 CAPSULE, DELAYED RELEASE ORAL at 21:30

## 2024-02-14 RX ADMIN — HYDROXYCHLOROQUINE SULFATE 200 MG: 200 TABLET ORAL at 08:11

## 2024-02-14 RX ADMIN — BUSPIRONE HYDROCHLORIDE 30 MG: 15 TABLET ORAL at 21:33

## 2024-02-14 RX ADMIN — LISINOPRIL 10 MG: 10 TABLET ORAL at 08:11

## 2024-02-14 RX ADMIN — OXYCODONE HYDROCHLORIDE 15 MG: 5 TABLET ORAL at 01:58

## 2024-02-14 RX ADMIN — AZELASTINE HYDROCHLORIDE 2 SPRAY: 137 SPRAY, METERED NASAL at 21:23

## 2024-02-14 RX ADMIN — FAMOTIDINE 20 MG: 20 TABLET ORAL at 08:11

## 2024-02-14 RX ADMIN — ACETAMINOPHEN 975 MG: 325 TABLET, FILM COATED ORAL at 06:45

## 2024-02-14 RX ADMIN — PREGABALIN 150 MG: 75 CAPSULE ORAL at 08:11

## 2024-02-14 RX ADMIN — METHOCARBAMOL 750 MG: 750 TABLET ORAL at 14:37

## 2024-02-14 RX ADMIN — HYDROXYCHLOROQUINE SULFATE 200 MG: 200 TABLET ORAL at 21:28

## 2024-02-14 RX ADMIN — SENNOSIDES AND DOCUSATE SODIUM 1 TABLET: 8.6; 5 TABLET ORAL at 21:28

## 2024-02-14 ASSESSMENT — ACTIVITIES OF DAILY LIVING (ADL)
ADLS_ACUITY_SCORE: 31
ADLS_ACUITY_SCORE: 31
ADLS_ACUITY_SCORE: 30
ADLS_ACUITY_SCORE: 31
ADLS_ACUITY_SCORE: 30
ADLS_ACUITY_SCORE: 31
ADLS_ACUITY_SCORE: 30
ADLS_ACUITY_SCORE: 31
ADLS_ACUITY_SCORE: 30
ADLS_ACUITY_SCORE: 30

## 2024-02-14 NOTE — PLAN OF CARE
Patient is alert and oriented.  Is tolerating regular diet.  Is voiding good amounts.  Lidocaine stopped this morning.  Now has Toradol added for help with pain and this helps her greatly.  Is receiving pain meds as they are available.  Is wearing [place] J collar at all times.  Was not able to get standing films today, will try again tomorrow.  Call light is within reach.  Is able to make needs known.  Will continue with plan of care.    Patient just stated she is having double vision and has been having double vision for her entire hospital stay.  Lidocaine drip stopped this morning.  Ortho notified.

## 2024-02-14 NOTE — PROGRESS NOTES
[facility]    Medicine Progress Note - Hospitalist Service, GOLD TEAM 17    Date of Admission:  2/12/2024    Assessment & Plan   A: Patient is a 53 y/o woman who has a past medical history significant for asthma, interstitial lung disease, seronegative inflammatory arthritis, obstructive sleep apnea, hypertension and GERD. Patient underwent posterior instrumented spinal fusion C2-5, laminectomies C3-4, use of local autograft and crushed cancellous allograft on 12-Feb-2024 for cervical pseudoarthrosis, cervical radiculopathy and cervical stenosis with myelopathy.    Patient noted having diplopia. Cause of diplopia is unclear.     P:  1.) Cervical pseudoarthrosis, cervical radiculopathy and cervical stenosis with myelopathy s/p surgical intervention:  - Patient on scheduled acetaminophen. Patient on acetaminophen, IV hydromorphone and oxycodone as needed.     2.) Diplopia of unclear cause; patient noted no other symptoms:  - Monitoring for neurological signs and symptoms.  - Monitoring off lidocaine drip.    3.) Hypoxemia:  - Oxygen support as needed.     4.) Hypertension:  - Controlled on hydrochlorothiazide and lisinopril.   - Monitoring.     5.) Interstitial lung disease:  - To f/u with Pulmonology as outpatient for further management.     6.) Moderate persistent asthma, uncomplicated:  - Monitoring for symptoms.     7.) Restless legs syndrome:  - Patient on requip and lyrica.     8.) Bipolar disorder; depression:  - Patient on abilify, duloxetine and buspar.     9.) GERD:  - Patient on PPI.     10.) Seronegative inflammatory arthritis:  - Patient on hydroxychloroquine.   - To f/u with Rheumatology as outpatient.     11.) Obstructive sleep apnea:  - Patient using CPAP.             Diet: Advance Diet as Tolerated: Regular Diet Adult  Snacks/Supplements Adult: [place]; With Meals  Snacks/Supplements Adult: Ensure Max Protein (bariatric); With Meals    Hicks Catheter: Not  "present  Lines: None     Cardiac Monitoring: None  Code Status: Full Code      Clinically Significant Risk Factors                  # Hypertension: Noted on problem list        # Obesity: Estimated body mass index is 32.23 kg/m  as calculated from the following:    Height as of this encounter: 1.626 m (5' 4.02\").    Weight as of this encounter: 85.2 kg (187 lb 13.3 oz)., PRESENT ON ADMISSION     # Asthma: noted on problem list        Disposition Plan     Expected Discharge Date: 02/15/2024                    Moody Bernstein MD  Hospitalist Service, GOLD TEAM [address] [place] [address]  Securely message with [employer] (more info)  Text page via [employer] Paging/Directory   See signed in provider for up to date coverage information  ______________________________________________________________________    Interval History   Patient noted pain in neck was worse earlier in the day but noted that pain is now better controlled.     Patient noted some binocular diplopia; patient noted no diplopia with one eye closed. Patient noted no other visual disturbances. Patient noted no new weakness or numbness. Patient noted no speech changes. Patient noted having diplopia some years ago. Patient noted that workup at that time was unremarkable and stated that she was eventually told that it was due to her \"eyes being tired\". Patient noted that this episode of diplopia spontaneously resolved.    Physical Exam   Vital Signs: Temp: 98.7  F (37.1  C) Temp src: Oral BP: 105/65 Pulse: 63   Resp: 16 SpO2: 95 % O2 Device: Oxymizer cannula Oxygen Delivery: 2 LPM  Weight: 187 lbs 13.31 oz    General: Patient comfortable, NAD.  Heart: RRR, S1 S2 w/o murmurs.  Lungs: Breath sounds present. No crackles/wheezes heard.  Abdomen: Soft, nontender.  Neuro: Extraocular movements intact but poor convergence of eyes. No nystagmus.       Medical Decision Making             Data     "

## 2024-02-14 NOTE — PLAN OF CARE
Goal Outcome Evaluation:    Pt is progressing towards goals. VS stable, Pt A&O x4 .    Pt pain managed well overnight with Pt reported pain between 2-5 out of 10 all shift.     Pt moving well with assistance of 1 with walker and gaitbelt.     Pt stated that it can be difficult to swallow bigger food items. soft foods were offered and the pt accepted.     Pt detached drain tubing, it was reattached and suctioned. Drain had output of 35 mL overnight

## 2024-02-14 NOTE — PROGRESS NOTES
"Orthopedic Surgery Progress Note:     Subjective:   Doing well. Tolerating PO. Pain controlled. Worked well with therapy.     Objective:   /74 (BP Location: Left arm, Patient Position: Supine, Cuff Size: Adult Regular)   Pulse 83   Temp 97.7  F (36.5  C) (Oral)   Resp 16   Ht 1.626 m (5' 4.02\")   Wt 85.2 kg (187 lb 13.3 oz)   LMP  (LMP Unknown)   SpO2 96%   BMI 32.23 kg/m    No intake/output data recorded.  General: NAD. Resting comfortably in bed.  Respiratory: Breathing comfortably on RA.  Drain Output: 70ml yesterday at 1600.  Musculoskeletal:     Motor Strength Right Left   Deltoids: C5 5/5 5/5   Biceps: C5 5/5 5/5   Brachialis: C6 5/5 5/5   Wrist extension: C6 5/5 5/5   Triceps: C7  5/5 5/5   Wrist flexion: C7 5/5 5/5    strength: C8 5/5 5/5   Hand intrinsics: T1 5/5 5/5      Sensation from C4-L1 is preserved.    Laboratory Data:  Lab Results   Component Value Date    WBC 5.7 01/16/2024    HGB 11.6 (L) 02/13/2024     01/16/2024    INR 1.09 08/18/2019       Images:  No new images were obtained.    Assessment & Plan:   Zayra Ramirez is a 54 year old female s/p C2-C5 posterior instrumented fusion with C3 and C4 laminectomies on 2/12/24 with Dr. Wilhelm.      Goals for today:  PT/OT   Pain control   Remove drain today if output low   XR following drain removal   Possible discharge if clears therapy and pain controlled.      Spine Primary  - Activity: Up with assist until independent. No excessive bending or twisting. No lifting >10 lbs x 6 weeks. No James lift for transfers.   - Weightbearing Status: WBAT.  - Pain Management: Transition from IV to PO as tolerated. No NSAIDs.   - Antibiotics: Ancef 1 g Q8H x24 hours   - Diet: Begin with clear fluids and progress diet as tolerated.   - DVT Prophylaxis: SCDs only. No chemical DVT prophylaxis needed.  - Imaging: XR Upright PTDC; ordered.  - Labs: Labs PRN.  - Bracing/Splinting: [place] J  - Dressings: Keep Aquacel C/D/I x 7 days.  - Drains: " Document output per shift; will be discontinued at orthopedic surgery discretion.    - Physical Therapy/Occupational Therapy: Evaluation and treatment.  - Consults: Hospitalist.  - Follow-up: Clinic with Dr. Wilhelm in 6 weeks with repeat radiographs.     - Disposition: Pending progress with therapies, pain control on orals, and medical stability; anticipate discharge to home on POD3-4.     Orthopedic surgery staff for this patient is Dr. Wilhelm.     ------------------------------------------------------------------------------------------  David Barrett MD  Spine Fellow     PLEASE PAGE ME directly with any questions/concerns during regular weekday hours before 5 pm. If there is no response, if it is a weekend, or if it is during evening hours then please page the orthopedic surgery resident on call.    FOLLOWUP:    Future Appointments   Date Time Provider Department Center   2/14/2024 11:30 AM Vannessa Rodriguez, PT URPT [place]   2/14/2024  1:30 PM Tea Prather, SLP URSLP [place]   2/20/2024 10:00 AM Dorothy Hong MD [facility]   2/29/2024 10:00 AM Panchito Saenz MD [facility]   3/6/2024  9:00 AM [facility]   4/2/2024  9:00 AM Philip Wilhelm MD [facility]

## 2024-02-14 NOTE — PLAN OF CARE
Goal Outcome Evaluation:      Plan of Care Reviewed With: patient    Overall Patient Progress: improvingOverall Patient Progress: improving  Shifts 7-3 & 3-11  Alert & oriented x4, able to make needs known. Up to the BR with SBA. Voiding spontaneously, passing flatus. Surgical drsg CDI, denies numbness or tingling. Hemovac in place, draining serosanguineous drainage. Pain managed with Lidocaine, scheduled Tylenol and PRN Oxycodone and Dilaudid IV x1. Walked to and from the BR several times. Wearing [place] J collar at all times. Fall precautions in place.

## 2024-02-14 NOTE — PLAN OF CARE
Physical Therapy Discharge Summary    Reason for therapy discharge:    All goals and outcomes met, no further needs identified.    Progress towards therapy goal(s). See goals on Care Plan in [facility] electronic health record for goal details.  Goals met    Therapy recommendation(s):    No further therapy is recommended.

## 2024-02-15 ENCOUNTER — TELEPHONE (OUTPATIENT)
Dept: OPHTHALMOLOGY | Facility: CLINIC | Age: 54
End: 2024-02-15

## 2024-02-15 ENCOUNTER — APPOINTMENT (OUTPATIENT)
Dept: SPEECH THERAPY | Facility: CLINIC | Age: 54
DRG: 472 | End: 2024-02-15
Attending: ORTHOPAEDIC SURGERY
Payer: COMMERCIAL

## 2024-02-15 ENCOUNTER — APPOINTMENT (OUTPATIENT)
Dept: GENERAL RADIOLOGY | Facility: CLINIC | Age: 54
DRG: 472 | End: 2024-02-15
Attending: NURSE PRACTITIONER
Payer: COMMERCIAL

## 2024-02-15 LAB
ANION GAP SERPL CALCULATED.3IONS-SCNC: 4 MMOL/L (ref 7–15)
BUN SERPL-MCNC: 18.2 MG/DL (ref 6–20)
CALCIUM SERPL-MCNC: 8.7 MG/DL (ref 8.6–10)
CHLORIDE SERPL-SCNC: 95 MMOL/L (ref 98–107)
CREAT SERPL-MCNC: 0.8 MG/DL (ref 0.51–0.95)
DEPRECATED HCO3 PLAS-SCNC: 33 MMOL/L (ref 22–29)
EGFRCR SERPLBLD CKD-EPI 2021: 88 ML/MIN/1.73M2
GLUCOSE SERPL-MCNC: 106 MG/DL (ref 70–99)
HOLD SPECIMEN: NORMAL
POTASSIUM SERPL-SCNC: 4.6 MMOL/L (ref 3.4–5.3)
SODIUM SERPL-SCNC: 132 MMOL/L (ref 135–145)

## 2024-02-15 PROCEDURE — 92526 ORAL FUNCTION THERAPY: CPT | Mod: GN

## 2024-02-15 PROCEDURE — 250N000013 HC RX MED GY IP 250 OP 250 PS 637: Performed by: NURSE PRACTITIONER

## 2024-02-15 PROCEDURE — 120N000002 HC R&B MED SURG/OB UMMC

## 2024-02-15 PROCEDURE — 250N000011 HC RX IP 250 OP 636: Performed by: NURSE PRACTITIONER

## 2024-02-15 PROCEDURE — 80048 BASIC METABOLIC PNL TOTAL CA: CPT | Performed by: ORTHOPAEDIC SURGERY

## 2024-02-15 PROCEDURE — 72040 X-RAY EXAM NECK SPINE 2-3 VW: CPT | Mod: 26 | Performed by: STUDENT IN AN ORGANIZED HEALTH CARE EDUCATION/TRAINING PROGRAM

## 2024-02-15 PROCEDURE — 250N000013 HC RX MED GY IP 250 OP 250 PS 637: Performed by: ORTHOPAEDIC SURGERY

## 2024-02-15 PROCEDURE — 250N000013 HC RX MED GY IP 250 OP 250 PS 637: Performed by: PHYSICIAN ASSISTANT

## 2024-02-15 PROCEDURE — 99232 SBSQ HOSP IP/OBS MODERATE 35: CPT | Performed by: INTERNAL MEDICINE

## 2024-02-15 PROCEDURE — 99254 IP/OBS CNSLTJ NEW/EST MOD 60: CPT | Performed by: PHYSICIAN ASSISTANT

## 2024-02-15 PROCEDURE — 36415 COLL VENOUS BLD VENIPUNCTURE: CPT | Performed by: ORTHOPAEDIC SURGERY

## 2024-02-15 PROCEDURE — 72040 X-RAY EXAM NECK SPINE 2-3 VW: CPT

## 2024-02-15 PROCEDURE — 250N000013 HC RX MED GY IP 250 OP 250 PS 637

## 2024-02-15 RX ORDER — POLYETHYLENE GLYCOL 3350 17 G/17G
17 POWDER, FOR SOLUTION ORAL DAILY
Qty: 510 G | Refills: 0 | Status: CANCELLED | OUTPATIENT
Start: 2024-02-15

## 2024-02-15 RX ORDER — AMOXICILLIN 250 MG
1 CAPSULE ORAL 2 TIMES DAILY
Status: CANCELLED | COMMUNITY
Start: 2024-02-15

## 2024-02-15 RX ORDER — OXYCODONE HYDROCHLORIDE 10 MG/1
10 TABLET ORAL
Status: DISCONTINUED | OUTPATIENT
Start: 2024-02-15 | End: 2024-02-16 | Stop reason: HOSPADM

## 2024-02-15 RX ORDER — CARBOXYMETHYLCELLULOSE SODIUM 5 MG/ML
1 SOLUTION/ DROPS OPHTHALMIC 4 TIMES DAILY PRN
Status: DISCONTINUED | OUTPATIENT
Start: 2024-02-15 | End: 2024-02-16 | Stop reason: HOSPADM

## 2024-02-15 RX ORDER — ACETAMINOPHEN 325 MG/1
975 TABLET ORAL EVERY 8 HOURS
Status: CANCELLED | COMMUNITY
Start: 2024-02-15

## 2024-02-15 RX ORDER — DIPHENHYDRAMINE HCL 25 MG
25 CAPSULE ORAL EVERY 6 HOURS PRN
Status: CANCELLED | COMMUNITY
Start: 2024-02-15

## 2024-02-15 RX ORDER — METHOCARBAMOL 750 MG/1
750 TABLET, FILM COATED ORAL 3 TIMES DAILY
Status: DISCONTINUED | OUTPATIENT
Start: 2024-02-15 | End: 2024-02-16 | Stop reason: HOSPADM

## 2024-02-15 RX ORDER — PREGABALIN 75 MG/1
150 CAPSULE ORAL 2 TIMES DAILY
Status: DISCONTINUED | OUTPATIENT
Start: 2024-02-15 | End: 2024-02-16 | Stop reason: HOSPADM

## 2024-02-15 RX ADMIN — PANTOPRAZOLE SODIUM 40 MG: 40 TABLET, DELAYED RELEASE ORAL at 21:19

## 2024-02-15 RX ADMIN — SENNOSIDES AND DOCUSATE SODIUM 1 TABLET: 8.6; 5 TABLET ORAL at 08:45

## 2024-02-15 RX ADMIN — DULOXETINE HYDROCHLORIDE 120 MG: 60 CAPSULE, DELAYED RELEASE ORAL at 21:19

## 2024-02-15 RX ADMIN — BUSPIRONE HYDROCHLORIDE 30 MG: 15 TABLET ORAL at 08:45

## 2024-02-15 RX ADMIN — OXYCODONE HYDROCHLORIDE 15 MG: 5 TABLET ORAL at 10:11

## 2024-02-15 RX ADMIN — HYDROXYCHLOROQUINE SULFATE 200 MG: 200 TABLET ORAL at 21:19

## 2024-02-15 RX ADMIN — CYCLOBENZAPRINE 10 MG: 10 TABLET, FILM COATED ORAL at 21:19

## 2024-02-15 RX ADMIN — OXYCODONE HYDROCHLORIDE 15 MG: 10 TABLET ORAL at 18:30

## 2024-02-15 RX ADMIN — ACETAMINOPHEN 975 MG: 325 TABLET, FILM COATED ORAL at 06:38

## 2024-02-15 RX ADMIN — METHOCARBAMOL 750 MG: 750 TABLET ORAL at 21:20

## 2024-02-15 RX ADMIN — KETOROLAC TROMETHAMINE 15 MG: 15 INJECTION, SOLUTION INTRAMUSCULAR; INTRAVENOUS at 04:31

## 2024-02-15 RX ADMIN — POLYETHYLENE GLYCOL 3350 17 G: 17 POWDER, FOR SOLUTION ORAL at 08:46

## 2024-02-15 RX ADMIN — METHOCARBAMOL 750 MG: 750 TABLET ORAL at 15:52

## 2024-02-15 RX ADMIN — HYDROXYCHLOROQUINE SULFATE 200 MG: 200 TABLET ORAL at 08:46

## 2024-02-15 RX ADMIN — MONTELUKAST 10 MG: 10 TABLET, FILM COATED ORAL at 21:20

## 2024-02-15 RX ADMIN — BUSPIRONE HYDROCHLORIDE 30 MG: 15 TABLET ORAL at 21:19

## 2024-02-15 RX ADMIN — DIPHENHYDRAMINE HYDROCHLORIDE 25 MG: 25 CAPSULE ORAL at 04:30

## 2024-02-15 RX ADMIN — FAMOTIDINE 20 MG: 20 TABLET ORAL at 21:19

## 2024-02-15 RX ADMIN — LISINOPRIL 10 MG: 10 TABLET ORAL at 08:44

## 2024-02-15 RX ADMIN — OXYCODONE HYDROCHLORIDE 15 MG: 5 TABLET ORAL at 06:03

## 2024-02-15 RX ADMIN — PREGABALIN 150 MG: 75 CAPSULE ORAL at 18:29

## 2024-02-15 RX ADMIN — METHOCARBAMOL 750 MG: 750 TABLET ORAL at 08:44

## 2024-02-15 RX ADMIN — FAMOTIDINE 20 MG: 20 TABLET ORAL at 08:46

## 2024-02-15 RX ADMIN — ACETAMINOPHEN 975 MG: 325 TABLET, FILM COATED ORAL at 15:51

## 2024-02-15 RX ADMIN — OXYCODONE HYDROCHLORIDE 15 MG: 10 TABLET ORAL at 21:28

## 2024-02-15 RX ADMIN — ROPINIROLE HYDROCHLORIDE 0.5 MG: 0.25 TABLET, FILM COATED ORAL at 21:20

## 2024-02-15 RX ADMIN — DIPHENHYDRAMINE HYDROCHLORIDE 25 MG: 25 CAPSULE ORAL at 10:11

## 2024-02-15 RX ADMIN — METHOCARBAMOL 750 MG: 750 TABLET ORAL at 02:54

## 2024-02-15 RX ADMIN — OXYCODONE HYDROCHLORIDE 15 MG: 5 TABLET ORAL at 01:20

## 2024-02-15 RX ADMIN — OXYCODONE HYDROCHLORIDE 15 MG: 10 TABLET ORAL at 15:51

## 2024-02-15 RX ADMIN — SENNOSIDES AND DOCUSATE SODIUM 1 TABLET: 8.6; 5 TABLET ORAL at 21:20

## 2024-02-15 RX ADMIN — PREGABALIN 150 MG: 75 CAPSULE ORAL at 08:44

## 2024-02-15 RX ADMIN — DIPHENHYDRAMINE HYDROCHLORIDE 25 MG: 25 CAPSULE ORAL at 18:32

## 2024-02-15 RX ADMIN — AZELASTINE HYDROCHLORIDE 2 SPRAY: 137 SPRAY, METERED NASAL at 20:00

## 2024-02-15 RX ADMIN — AZELASTINE HYDROCHLORIDE 2 SPRAY: 137 SPRAY, METERED NASAL at 10:11

## 2024-02-15 RX ADMIN — HYDROCHLOROTHIAZIDE 12.5 MG: 12.5 TABLET ORAL at 08:45

## 2024-02-15 RX ADMIN — ARIPIPRAZOLE 5 MG: 5 TABLET ORAL at 08:45

## 2024-02-15 ASSESSMENT — ACTIVITIES OF DAILY LIVING (ADL)
ADLS_ACUITY_SCORE: 31

## 2024-02-15 NOTE — CONSULTS
OPHTHALMOLOGY CONSULT NOTE  02/15/24    Patient: Zayra Ramirez    ASSESSMENT/PLAN:     Zayra Ramirez is a 54 year old female who presented with horizontal binocular diplopia after spinal surgery    Horizontal binocular diplopia  Decompensated left intermittent exotropia  Pt has known history of intermittent exotropia, which previously worsened when fatigued. Underwent spinal fusion and developed worsened double vision that goes away when she closes one (either) eye. On exam, vision 20/20 OU, CVF full, EOM full, pressures normal, and PERRLA. Has an intermittent left exotropia with exodeviation of left eye at primary gaze. Fundus exam unremarkable.    Plan:  Provided reassurance and discussed that this is similar to her presentation the last time this set of symptoms occurred.   Please schedule follow up at non-urgent next available for neurophthalmology outpatient clinic on discharge; will plan at that time for orthoptist to evaluate  Artificial tears four times a day  as needed for symptoms of dry eye    It is our pleasure to participate in this patient's care and treatment. Please contact us with any further questions or concerns.    Discussed with Dr. Jc Quiros who agreed with this assessment and plan.     Ophthalmology will sign off. Please contact ophthalmologist on call with any questions or concerns. We appreciate your care of this patient.    Nicholas Tinoco MD, PGY2  Ophthalmology Resident  [facility]    HISTORY OF PRESENTING ILLNESS:     Zayra Ramirez is a 54 year old female who presented on 02/15/24  with decompensated intermittent exotropia.    Pohx intermittent exotropia and lazy eye. States that since her spinal surgery she has been having double vision, [id number].     Denies change in vision, double vision, flashes, floaters, curtain-like defects, and pain with eye movements.     10+ review of systems were otherwise negative except for that which has been stated  above.      OCULAR/MEDICAL/SURGICAL HISTORIES:     Past Ocular History:   Last eye exam: n/a   Previously diagnosed ocular conditions: left intermittent exotropia  Prior eye surgery/laser: None  Contact lens wear: None  Glasses: wears bifocals  Eyedrops: none    Pertinent Systemic Medications:   Current Facility-Administered Medications   Medication    acetaminophen (TYLENOL) tablet 650 mg    acetaminophen (TYLENOL) tablet 975 mg    albuterol (PROVENTIL HFA/VENTOLIN HFA) inhaler    ARIPiprazole (ABILIFY) tablet 5 mg    azelastine (ASTELIN) nasal spray 2 spray    benzocaine-menthol (CHLORASEPTIC) 6-10 MG lozenge 1 lozenge    bisacodyl (DULCOLAX) suppository 10 mg    busPIRone (BUSPAR) tablet 30 mg    cloNIDine (CATAPRES) tablet 0.1 mg    cyclobenzaprine (FLEXERIL) tablet 10 mg    diphenhydrAMINE (BENADRYL) capsule 25 mg    Or    diphenhydrAMINE (BENADRYL) injection 25 mg    DULoxetine (CYMBALTA) DR capsule 120 mg    famotidine (PEPCID) tablet 20 mg    Or    famotidine (PEPCID) injection 20 mg    hydroCHLOROthiazide (HYDRODIURIL) tablet 12.5 mg    hydroxychloroquine (PLAQUENIL) tablet 200 mg    lidocaine (LMX4) cream    lidocaine 1 % 0.1-1 mL    lisinopril (ZESTRIL) tablet 10 mg    magnesium hydroxide (MILK OF MAGNESIA) suspension 30 mL    methocarbamol (ROBAXIN) tablet 750 mg    montelukast (SINGULAIR) tablet 10 mg    naloxone (NARCAN) injection 0.2 mg    Or    naloxone (NARCAN) injection 0.4 mg    Or    naloxone (NARCAN) injection 0.2 mg    Or    naloxone (NARCAN) injection 0.4 mg    ondansetron (ZOFRAN ODT) ODT tab 4 mg    Or    ondansetron (ZOFRAN) injection 4 mg    oxyCODONE IR (ROXICODONE) tablet 10 mg    Or    oxyCODONE (ROXICODONE) tablet 15 mg    pantoprazole (PROTONIX) EC tablet 40 mg    polyethylene glycol (MIRALAX) Packet 17 g    pregabalin (LYRICA) capsule 150 mg    prochlorperazine (COMPAZINE) injection 10 mg    Or    prochlorperazine (COMPAZINE) tablet 10 mg    rOPINIRole (REQUIP) tablet 0.5 mg     senna-docusate (SENOKOT-S/PERICOLACE) 8.6-50 MG per tablet 1 tablet    sodium chloride (PF) 0.9% PF flush 3 mL    sodium chloride (PF) 0.9% PF flush 3 mL    sodium chloride 0.9 % infusion     Facility-Administered Medications Ordered in Other Encounters   Medication    Lidocaine 1 % injection 0.5-5 mL    sodium chloride (PF) 0.9% PF flush 5-50 mL         Past Medical History:  Past Medical History:   Diagnosis Date    Allergic rhinitis     Anemia     Arthritis     Asthma     copd    Cervical pseudoarthrosis, sequela     Dental abscess 08/2015    Depressive disorder     Diaphragmatic hernia 07/11/2016    Gastroesophageal reflux disease     History of emphysema (H)     Hoarseness     Hypertension     Obstructive sleep apnea     Other chronic pain     Respiratory bronchiolitis interstitial lung disease (H)     Sleep apnea     Smoker 11/02/2015       Past Surgical History:   Past Surgical History:   Procedure Laterality Date    CERVICAL FUSION      COLONOSCOPY      COLONOSCOPY N/A 02/06/2020    Procedure: COLONOSCOPY, WITH POLYPECTOMY AND BIOPSY;  Surgeon: Julian Mccullough MD;  Location:  GI    CYSTOSCOPY      ENT SURGERY      ESOPHAGOSCOPY, GASTROSCOPY, DUODENOSCOPY (EGD), COMBINED N/A 02/06/2020    Procedure: ESOPHAGOGASTRODUODENOSCOPY (EGD);  Surgeon: Julian Mccullough MD;  Location:  GI    EXCISE LESION INTRAORAL Bilateral 10/03/2018    Procedure: EXCISE LESION INTRAORAL;  Wide Local Excision Of of Left Oral Cavity Ulcer;  Surgeon: Morro Mijares MD;  Location: [facility] OR    GYN SURGERY      HC DRAIN SKIN ABSCESS SIMPLE/SINGLE  03/16/2012    Procedure:INCISION AND DRAINAGE, ABSCESS, SIMPLE; Surgeon:CHRISTIANO HANCOCK; Location: GI    HEAD & NECK SURGERY      HYSTERECTOMY      HYSTERECTOMY      INCISION AND DRAINAGE ABDOMEN WASHOUT, COMBINED      INJECT EPIDURAL CERVICAL N/A 10/14/2021    Procedure: Cervical 7- Thoracic 1 epidural steroid injection with fluoroscopy;  Surgeon: Tram Florian  MD;  Location: [facility] OR    INJECT EPIDURAL LUMBAR Right 09/15/2020    Procedure: Lumbar5- sacral 1 epidural steroid injection with fluoroscopy;  Surgeon: Tram Florian MD;  [facility]    INJECT EPIDURAL LUMBAR Right 06/29/2021    Procedure: Lumbar 5 sacral 1 epidural steroid injection with fluoroscopy;  Surgeon: Tram Florian MD;  Location: [facility] OR    INJECT SACROILIAC JOINT Bilateral 06/16/2020    Procedure: Bilateral sacroiliac joint steroid injection with fluoroscopy;  Surgeon: Tram Florian MD;  [facility]    LAMINECTOMY THORACIC ONE LEVEL N/A 08/19/2019    Procedure: LAMINECTOMY, SPINE, THORACIC, 11-12 and Part of Lumbar 1, DRAINAGE OF EPIDURAL ABCESS, Epidural Drain Placement X 2;  Surgeon: Yadiel Beal MD;  Location: [facility] OR    OPTICAL TRACKING SYSTEM FUSION POSTERIOR CERVICAL THREE + LEVELS N/A 2/12/2024    Procedure: Posterior instrumented spinal fusion cervical 2-5; laminectomies cervical 3-4; use of local autograft and crushed cancellous allograft.;  Surgeon: Philip Wilhelm MD;  Location: [facility] OR    OPTICAL TRACKING SYSTEM FUSION SPINE POSTERIOR LUMBAR THREE+ LEVELS N/A 11/30/2022    Procedure: Posterior Instrumented Spinal Fusion Thoracic 8 to Sacrum with Bilateral Pelvic Fixation; Transforaminal Lumbar Interbody Fusion with Erickson-Nixon Osteotomy Lumbar 1 to Sacral 1 (5 levels); Use of Infuse Bone Morphogenetic Protein Large Kit and Allograft;  Surgeon: Philip Wilhelm MD;  Location: [facility] OR    ORTHOPEDIC SURGERY      VA PERCUT IMPLNT NEUROELECT,EPIDURAL N/A 08/08/2019    Procedure: TRIAL, SPINAL CORD STIMULATOR WITH BOSTON SCIENTIFIC;  Surgeon: Sipple, Daniel Peter, DO;  [facility];  Service: Pain    RADIO FREQUENCY ABLATION / DESTRUCTION OF SACROILOAC JOINT DORSAL PRIMARY RAMUS Bilateral 12/17/2019    Procedure: Bilateral lumbar radiofrequency ablation with fluoroscopy and intravenous sedation ( Lumbar 2,3,4,5 medial branch nerves for the  bilateral lumbar3-4, 4-5 and 5-sacral1 joints.;  Surgeon: Tram Florian MD;  [facility]    RADIO FREQUENCY ABLATION / DESTRUCTION OF SACROILOAC JOINT DORSAL PRIMARY RAMUS Right 11/17/2020    Procedure: Right lumbar medial branch nerve radiofrequency ablation right L2,3,4,5 nerves supplying the right L3-4, L4-5 and L5-S1 facet joints;  Surgeon: Tram Florian MD;  Location: [facility] OR    spinal cord stimulator  08/08/2019    spinal cord stimulator removal  08/13/2019       Family History:   No history of macular degeneration or glaucoma    Social History:   No tobacco use    EXAMINATION:     Base Eye Exam       Visual Acuity (Snellen - Linear)         Right Left    Near cc 20/20 20/20              Tonometry (Tonopen, 2:56 PM)         Right Left    Pressure 13 15              Pupils         Pupils    Right PERRL    Left PERRL              Visual Fields         Left Right     Full Full              Extraocular Movement         Right Left     Full Full              Neuro/Psych       Oriented x3: Yes    Mood/Affect: Normal                  Slit Lamp and Fundus Exam       External Exam         Right Left    External Normal Normal              Slit Lamp Exam         Right Left    Lids/Lashes Normal, no ptosis Normal, no ptosis    Conjunctiva/Sclera White and quiet White and quiet    Cornea Clear grossly, inferior PEEs Clear grossly, inferior PEEs    Anterior Chamber Deep and quiet Deep and quiet    Iris Round and reactive Round and reactive    Lens NS NS    Anterior Vitreous Normal Normal              Fundus Exam         Right Left    Disc Normal Normal    C/D Ratio 0.2 0.2    Macula flat attached no lesions flat attached no lesions    Vessels tortuous tortuous    Periphery no lesions, heme, or detachments no lesions, heme, or detachments                    Labs/Studies/Imaging Performed       Nicholas Tinoco MD  Resident Physician, PGY2  Department of Ophthalmology  02/15/24 2:45 PM

## 2024-02-15 NOTE — CONSULTS
Pain Service Consultation Note  [facility]      Patient Name: Zayra Ramirez  MRN: [id number]   Age: 54 year old  Sex: female  Date: February 15, 2024                                      Reviewed: Yes    Visit/Communication Style   Virtual (Video) communication was used to evaluate Zayra.  Zayra consented to the use of video communication.  Video START time: 1218, 2/15/2024  Video STOP time: 1233, 2/15/2024   Patient's location: [facility] MED SURG  Provider's location during the visit: [facility]           Referring Provider:  Abhi Canada NP  Referring Service:  ortho  Reason for Consultation: acute post op pain    Assessment/Recommendations:  Zayra Ramirez is a 54 year old female who has PMH of HTN, ROBERT w/ CPAP w/ supplemental O2, ILD, anxiety, depression, borderline personality disorder, PTSD, restless leg syndrome, neuropathy,  GERD, TMJ syndrome, rheumatoid arthritis, chronic immunosuppression, chronic right shoulder pain, pseudogout, and previous other spinal surgeries who was admitted on 2/12/24 after Posterior instrumented spinal fusion cervical 2-5; laminectomies cervical 3-4; use of local autograft and crushed cancellous allograft on 2/12/24 with Dr. Wilhelm.    Today, Zayra reports improved pain with oxycodone 15mg.  Pain is in the posterior neck with pain level of 4/10 whereas earlier it was 7/10.  Note, PTA her neck pain was 7/10 with arm pain.  Not on chronic opioids. States arm pain is gone since this surgery.  She does not feel 100% ready to be discharged to home because not feeling 100% comfortable.    We discussed pain plan as below.  Indications, risks and benefits.    Plan:   Change oxycodone to 10-15mg PO Q 3 hours PRN while inpatient.  Would discharge with oxycodone 10-15mg PO Q 3.5hours PRN (max 7 doses/day) x 1-2 days then decrease to Q 4 hours PRN x 1-2 days (max 6 doses/day) and then continue to decrease by 1 doses every 1-2 days until  discontinuation.  -discussed that this is PRN so she may use less if not needed and taper off faster.  Must space out  dose at least 3.5 hours apart.  Acetaminophen  Robaxin-would recommend TID as she already taking flexeril 10mg at bedtime (baseline)  PTA Pregabalin 150mg BID   Lidocaine ointment  Bowel regimen.    Thank you for the opportunity to participate in the care of Zayra Ramirez  Pain Service will sign off.    Discussed with attending anesthesiologist  Primary Service Contacted with Recommendations? Yes    Rayna Hernandez PA-C  2/15/2024        [place]  [place] pulled from [place] on 02/15/24.   02/09/2024 11/30/2023 1 Pregabalin 150 Mg Capsule 60.00 30 Be Gol [id number] Gra (7939) 2/3 2.01 [place]   01/08/2024 11/30/2023 1 Pregabalin 150 Mg Capsule 60.00 30 Be Gol [id number] Gra (7939) 1/3 2.[place]   11/30/2023 11/30/2023 1 Pregabalin 150 Mg Capsule 60.00 30 Be Gol [id number] Gra (7939) 0/3 2.[place]   11/16/2023 11/15/2023 1 Pregabalin 100 Mg Capsule 60.00 30 Be Gol [id number] Gra (7939) 0/0 1.34 [place]   10/07/2023 07/26/2023 1 Pregabalin 150 Mg Capsule 60.00 30 Be Gol [id number] Gra (7939) 2/3 2.01 [place]   08/31/2023 07/26/2023 1 Pregabalin 150 Mg Capsule 60.00 30 Be Gol [id number] Gra (7939) 1/3 2.01 [place]   07/26/2023 07/26/2023 1 Pregabalin 150 Mg Capsule 60.00 30 Be Gol [id number] Gra (7939) 0/3 2.01 [place]   06/21/2023 02/28/2023 1 Pregabalin 150 Mg Capsule 60.00 30 Be Gol [id number] Gra (7939) 3/3        Pain Medications/Prescriber: Bennett E Goltz, PA-C     Past Medical History:  Past Medical History:   Diagnosis Date    Allergic rhinitis     Anemia     Arthritis     Asthma     copd    Cervical pseudoarthrosis, sequela     Dental abscess 08/2015    Depressive disorder     Diaphragmatic hernia 07/11/2016    Gastroesophageal reflux disease     History of emphysema (H)     Hoarseness     Hypertension     Obstructive sleep apnea     Other chronic pain      Respiratory bronchiolitis interstitial lung disease (H)     Sleep apnea     Smoker 2015         Family History:    Family History   Problem Relation Age of Onset    Asthma Mother     Restless Leg Syndrome Mother     Other Cancer Father         base of tongue cancer at age ~65    Hypertension Father     Back Pain Father     Restless Leg Syndrome Father     Coronary Artery Disease Father     Diabetes Father     Depression Father     Anxiety Disorder Father     Osteoporosis Father     Restless Leg Syndrome Sister     Breast Cancer Maternal Aunt 55    Anesthesia Reaction No family hx of     Deep Vein Thrombosis (DVT) No family hx of     Thyroid Cancer No family hx of     Multiple endocrine neoplasia No family hx of        Social History:  Social History     Tobacco Use    Smoking status: Former     Packs/day: 1.50     Years: 35.00     Additional pack years: 0.00     Total pack years: 52.50     Types: Cigarettes     Start date: 1996     Quit date: 2021     Years since quittin.2    Smokeless tobacco: Former     Quit date: 2021   Substance Use Topics    Alcohol use: Never             Review of Systems:  Complete ROS reviewed. Unless otherwise noted, all other systems found to be negative.        Laboratory Results:  Recent Labs   Lab Test 02/15/24  0830 24  0642 24  1243 19  0616 19  0416 19  2126   INR  --   --   --   --   --  1.09   PLT  --   --  270   < >  --  363   PTT  --   --   --   --  37  --    BUN 18.2   < > 14.9   < >  --  10    < > = values in this interval not displayed.       Allergies:  Allergies   Allergen Reactions    Bee Venom Anaphylaxis    Doxycycline Anaphylaxis     Patient thinks it may have been just nausea and vomiting, however unable to confirm  Other reaction(s): throat closes    Erythromycin      Other reaction(s): Vomiting      Hydrocodone-Acetaminophen Itching         Current Pain Related Medications:  Medications related to Pain  Management (From now, onward)      Start     Dose/Rate Route Frequency Ordered Stop    02/15/24 0000  acetaminophen (TYLENOL) tablet 650 mg         650 mg Oral EVERY 4 HOURS PRN 02/12/24 1243      02/14/24 2119  diphenhydrAMINE (BENADRYL) capsule 25 mg        See Hyperspace for full Linked Orders Report.    25 mg Oral EVERY 6 HOURS PRN 02/14/24 2120      02/14/24 2119  diphenhydrAMINE (BENADRYL) injection 25 mg        See Hyperspace for full Linked Orders Report.    25 mg Intravenous EVERY 6 HOURS PRN 02/14/24 2120      02/14/24 0900  methocarbamol (ROBAXIN) tablet 750 mg         750 mg Oral EVERY 6 HOURS 02/14/24 0834      02/13/24 0800  polyethylene glycol (MIRALAX) Packet 17 g         17 g Oral DAILY 02/12/24 1154      02/13/24 0800  pregabalin (LYRICA) capsule 150 mg         150 mg Oral EVERY MORNING 02/12/24 1640      02/12/24 2200  acetaminophen (TYLENOL) tablet 975 mg         975 mg Oral EVERY 8 HOURS 02/12/24 1243 02/15/24 2159 02/12/24 2200  cyclobenzaprine (FLEXERIL) tablet 10 mg        Note to Pharmacy: PTA Sig:TAKE 1 TABLET BY MOUTH EVERYDAY AT BEDTIME      10 mg Oral AT BEDTIME 02/12/24 1640      02/12/24 2200  DULoxetine (CYMBALTA) DR capsule 120 mg        Note to Pharmacy: PTA Sig:Take 120 mg by mouth At Bedtime       120 mg Oral AT BEDTIME 02/12/24 1640      02/12/24 2000  senna-docusate (SENOKOT-S/PERICOLACE) 8.6-50 MG per tablet 1 tablet         1 tablet Oral 2 TIMES DAILY 02/12/24 1154      02/12/24 2000  busPIRone (BUSPAR) tablet 30 mg        Note to Pharmacy: PTA Sig:Take 30 mg by mouth 2 times daily       30 mg Oral 2 TIMES DAILY 02/12/24 1640      02/12/24 1517  lidocaine 1 % 0.1-1 mL         0.1-1 mL Other EVERY 1 HOUR PRN 02/12/24 1517      02/12/24 1517  lidocaine (LMX4) cream          Topical EVERY 1 HOUR PRN 02/12/24 1517      02/12/24 1300  pregabalin (LYRICA) capsule 150 mg         150 mg Oral EVERY 24 HOURS 02/12/24 1640      02/12/24 1243  oxyCODONE (ROXICODONE) tablet 10 mg       "  See Hyperspace for full Linked Orders Report.    10 mg Oral EVERY 4 HOURS PRN 02/12/24 1243      02/12/24 1243  oxyCODONE (ROXICODONE) tablet 15 mg        See Hyperspace for full Linked Orders Report.    15 mg Oral EVERY 4 HOURS PRN 02/12/24 1243      02/12/24 1152  magnesium hydroxide (MILK OF MAGNESIA) suspension 30 mL         30 mL Oral DAILY PRN 02/12/24 1154      02/12/24 1152  bisacodyl (DULCOLAX) suppository 10 mg         10 mg Rectal DAILY PRN 02/12/24 1154                Physical Exam:  Vitals: /71 (BP Location: Left arm)   Pulse 68   Temp 98.1  F (36.7  C) (Oral)   Resp 16   Ht 1.626 m (5' 4.02\")   Wt 85.2 kg (187 lb 13.3 oz)   LMP  (LMP Unknown)   SpO2 92%   BMI 32.23 kg/m      Physical Exam:     CONSTITUTIONAL/GENERAL APPEARANCE:  NAD  EYES: EOMI, sclera anicteric, PERRLA  ENT/NECK: atraumatic, lips and oral mucous membranes dry  RESPIRATORY: non-labored breathing. No cough, wheeze  CV: HR within normal limits  ABDOMEN: Soft, non-tender, non-distended  MUSCULOSKELETAL/BACK/SPINE/EXTREMITIES: Moves all extremities purposefully.  No edema or obvious joint deformities   NEURO: Alert and Oriented x3. Answers questions appropriately  SKIN/VASCULAR EXAM:  No jaundice, no visible rashes or lesions            Acute Inpatient Pain Service [employer]  Hours of pain coverage 24/7   Page via Amcom- Please Page the Pain Team Via Amcom: \"PAIN MANAGEMENT ACUTE INPATIENT/ [facility]\"           "

## 2024-02-15 NOTE — PROGRESS NOTES
[facility]    Medicine Progress Note - Hospitalist Service, GOLD TEAM 17    Date of Admission:  2/12/2024    Assessment & Plan   A: Patient is a 53 y/o woman who has a past medical history significant for asthma, interstitial lung disease, seronegative inflammatory arthritis, obstructive sleep apnea, hypertension and GERD. Patient underwent posterior instrumented spinal fusion C2-5, laminectomies C3-4, use of local autograft and crushed cancellous allograft on 12-Feb-2024 for cervical pseudoarthrosis, cervical radiculopathy and cervical stenosis with myelopathy.     Patient noted having diplopia. Patient has been seen by Ophthalmology and diplopia appears to be secondary to decompensated left intermittent exotropia.      P:  1.) Cervical pseudoarthrosis, cervical radiculopathy and cervical stenosis with myelopathy s/p surgical intervention:  - Patient on scheduled acetaminophen. Patient on acetaminophen, IV hydromorphone and oxycodone as needed.     2.) Diplopia secondary to decompensated left intermittent exotropia:  - To f/u with Neurophthalmology as outpatient.  - Artificial tears four times a day as needed for dry eye per Ophthalmology recommendations.      3.) Hypoxemia, resolved:  - Oxygen support as needed.     4.) Hypertension:  - Controlled on hydrochlorothiazide and lisinopril.   - Monitoring.     5.) Interstitial lung disease:  - To f/u with Pulmonology as outpatient for further management.     6.) Moderate persistent asthma, uncomplicated:  - Monitoring for symptoms.     7.) Restless legs syndrome:  - Patient on requip and lyrica.     8.) Bipolar disorder; depression:  - Patient on abilify, duloxetine and buspar.     9.) GERD:  - Patient on PPI.     10.) Seronegative inflammatory arthritis:  - Patient on hydroxychloroquine.   - To f/u with Rheumatology as outpatient.     11.) Obstructive sleep apnea:  - Patient using CPAP.          Diet: Advance Diet as  "Tolerated: Regular Diet Adult  Snacks/Supplements Adult: Hawk; With Meals  Snacks/Supplements Adult: Ensure Max Protein (bariatric); With Meals    Hicks Catheter: Not present  Lines: None     Cardiac Monitoring: None  Code Status: Full Code      Clinically Significant Risk Factors                  # Hypertension: Noted on problem list        # Obesity: Estimated body mass index is 32.23 kg/m  as calculated from the following:    Height as of this encounter: 1.626 m (5' 4.02\").    Weight as of this encounter: 85.2 kg (187 lb 13.3 oz)., PRESENT ON ADMISSION     # Asthma: noted on problem list        Disposition Plan      Expected Discharge Date: 02/16/2024                    Moody Bernstein MD  Hospitalist Service, GOLD TEAM 17  [facility]  Securely message with [employer] (more info)  Text page via [employer] Paging/Directory   See signed in provider for up to date coverage information  ______________________________________________________________________    Interval History   Patient noted diplopia improved. Patient noted pain controlled. Patient noted no new problems.    Physical Exam   Vital Signs: Temp: 98.1  F (36.7  C) Temp src: Oral BP: 120/71 Pulse: 68   Resp: 16 SpO2: 92 % O2 Device: BiPAP/CPAP Oxygen Delivery: 2 LPM  Weight: 187 lbs 13.31 oz    General: Patient comfortable, NAD.    Medical Decision Making           "

## 2024-02-15 NOTE — PLAN OF CARE
"Goal Outcome Evaluation:    Vitals: /73 (BP Location: Left arm, Patient Position: Supine, Cuff Size: Adult Regular)   Pulse 69   Temp 97.7  F (36.5  C) (Oral)   Resp 16   Ht 1.626 m (5' 4.02\")   Wt 85.2 kg (187 lb 13.3 oz)   LMP  (LMP Unknown)   SpO2 92%   BMI 32.23 kg/m   . LS clear.Hx of ILD,using oxygen at night if not using CPAP.  Neuros/CMS:  Alert,well orientated.Calm,cooperartive.No numbness/tingling sensation.   GI/ :  No BM yet,is passing gas.BS soft.Denies nausea.Voiding well.  Skin:  surgical scars on back from previous surgery. Posterior neck surgical incision UTV,covered with drsg. Benadryl given to help with itching.  Drain/Line: Hemovac patent,emptied 30ml. PIV line R forearm,saline locked.  Pain:Surgical pain controlled with oxycodone 15mg,scheduled robaxin/tylenol,IV toradol.Ice pack refreshed.  Activity: Up SBA1 and walker.MJ on at all times.  Equipments: [place] J collar,CPAP,Incentive spirometer.PCDs.                        "

## 2024-02-15 NOTE — PLAN OF CARE
Problem: Adult Inpatient Plan of Care  Goal: Plan of Care Review  Outcome: Progressing  Flowsheets (Taken 2/14/2024 2316)  Outcome Evaluation: Pt c/o pain in neck, states that pain meds help a little but wear off prior to next administration. Ice applied also to help alleviate pain, as she states its a burning sensation. Pt double vision is getting better per her report as some things such as the tv a appearing as a single. She complained of itching, new PRN added for IV benadryl, ABD pads also covering inner lining of [place]-J brace. Pt ambulated well w/ walker and gait belt. CPAP on at night, but sleeps inbetween cares so she likes to have nasal cannula 2LPM on continously.Continue POC.  Plan of Care Reviewed With: patient  Overall Patient Progress: improving  Goal: Absence of Hospital-Acquired Illness or Injury  Outcome: Progressing  Intervention: Identify and Manage Fall Risk  Recent Flowsheet Documentation  Taken 2/14/2024 1800 by Renita Herrmann RN  Safety Promotion/Fall Prevention:   activity supervised   assistive device/personal items within reach   lighting adjusted   mobility aid in reach   nonskid shoes/slippers when out of bed   room near nurse's station   toileting scheduled   room organization consistent  Intervention: Prevent Skin Injury  Recent Flowsheet Documentation  Taken 2/14/2024 1800 by Renita Herrmann RN  Body Position: position changed independently  Intervention: Prevent and Manage VTE (Venous Thromboembolism) Risk  Recent Flowsheet Documentation  Taken 2/14/2024 1800 by Renita Herrmann RN  VTE Prevention/Management: SCDs (sequential compression devices) off  Intervention: Prevent Infection  Recent Flowsheet Documentation  Taken 2/14/2024 1800 by Renita Herrmann RN  Infection Prevention: hand hygiene promoted  Goal: Optimal Comfort and Wellbeing  Outcome: Progressing  Goal: Readiness for Transition of Care  Outcome: Progressing     Problem: Spinal Surgery  Goal: Optimal Coping with  Surgery  Outcome: Progressing  Goal: Absence of Bleeding  Outcome: Met  Goal: Effective Bowel Elimination  Outcome: Progressing  Goal: Fluid and Electrolyte Balance  Outcome: Met  Goal: Optimal Functional Ability  Outcome: Progressing  Intervention: Optimize Functional Status  Recent Flowsheet Documentation  Taken 2/14/2024 1800 by Renita Herrmann RN  Activity Management: activity adjusted per tolerance  Positioning/Transfer Devices:   pillows   in use  Goal: Absence of Infection Signs and Symptoms  Outcome: Progressing  Intervention: Prevent or Manage Infection  Recent Flowsheet Documentation  Taken 2/14/2024 1800 by Renita Herrmann RN  Infection Prevention: hand hygiene promoted  Goal: Optimal Neurologic Function  Outcome: Progressing  Intervention: Optimize Neurologic Function  Recent Flowsheet Documentation  Taken 2/14/2024 1800 by Renita Herrmann RN  Body Position: position changed independently  Goal: Anesthesia/Sedation Recovery  Outcome: Met  Intervention: Optimize Anesthesia Recovery  Recent Flowsheet Documentation  Taken 2/14/2024 1800 by Renita Herrmann RN  Safety Promotion/Fall Prevention:   activity supervised   assistive device/personal items within reach   lighting adjusted   mobility aid in reach   nonskid shoes/slippers when out of bed   room near nurse's station   toileting scheduled   room organization consistent  Administration (IS): instruction provided, follow-up  Incentive Spirometer Predicted Level (mL): 1500  Number of Repetitions (IS): 10  Patient Tolerance (IS): good  Goal: Optimal Pain Control and Function  Outcome: Progressing  Goal: Nausea and Vomiting Relief  Outcome: Met  Goal: Effective Urinary Elimination  Outcome: Met  Goal: Effective Oxygenation and Ventilation  Outcome: Progressing   Goal Outcome Evaluation:      Plan of Care Reviewed With: patient    Overall Patient Progress: improving    Outcome Evaluation: Pt c/o pain in neck, states that pain meds help a little but wear  off prior to next administration. Ice applied also to help alleviate pain, as she states its a burning sensation. Pt double vision is getting better per her report as some things such as the tv a appearing as a single. She complained of itching, new PRN added for IV benadryl, ABD pads also covering inner lining of [place]-J brace. Pt ambulated well w/ walker and gait belt. CPAP on at night, but sleeps inbetween cares so she likes to have nasal cannula 2LPM on continously.Continue POC.

## 2024-02-15 NOTE — PROGRESS NOTES
"Orthopedic Surgery Progress Note:     Subjective:   Pain better controlled overnight. Worked with therapy. No BM. Passing gas. Tolerating PO     Objective:   /73 (BP Location: Left arm, Patient Position: Supine, Cuff Size: Adult Regular)   Pulse 69   Temp 97.7  F (36.5  C) (Oral)   Resp 16   Ht 1.626 m (5' 4.02\")   Wt 85.2 kg (187 lb 13.3 oz)   LMP  (LMP Unknown)   SpO2 92%   BMI 32.23 kg/m    I/O this shift:  In: -   Out: 30 [Drains:30]  General: NAD. Resting comfortably in bed.  Respiratory: Breathing comfortably on RA.  Drain Output: 35/30 over last 2 shifts.  Musculoskeletal:     Motor Strength Right Left   Deltoids: C5 5/5 5/5   Biceps: C5 5/5 5/5   Brachialis: C6 5/5 5/5   Wrist extension: C6 5/5 5/5   Triceps: C7  5/5 5/5   Wrist flexion: C7 5/5 5/5    strength: C8 5/5 5/5   Hand intrinsics: T1 5/5 5/5      Sensation from C4-L1 is preserved.      Laboratory Data:  Lab Results   Component Value Date    WBC 5.7 01/16/2024    HGB 11.6 (L) 02/13/2024     01/16/2024    INR 1.09 08/18/2019       Images:  No new images were obtained.    Assessment & Plan:   Zayra Ramirez is a 54 year old female s/p C2-C5 posterior instrumented fusion with C3 and C4 laminectomies on 2/12/24 with Dr. Wilhelm.      Goals for today:  PT/OT   Pain control   Remove drain today  XR following drain removal   Possible discharge if clears therapy and pain controlled.      Spine Primary  - Activity: Up with assist until independent. No excessive bending or twisting. No lifting >10 lbs x 6 weeks. No James lift for transfers.   - Weightbearing Status: WBAT.  - Pain Management: Transition from IV to PO as tolerated. No NSAIDs.   - Diet: Begin with clear fluids and progress diet as tolerated.   - DVT Prophylaxis: SCDs only. No chemical DVT prophylaxis needed.  - Imaging: XR Upright PTDC; ordered.  - Labs: Labs PRN.  - Bracing/Splinting: [place] J  - Dressings: Keep Aquacel C/D/I x 7 days.  - Drains: remove  - Physical " Therapy/Occupational Therapy: Evaluation and treatment.  - Consults: Hospitalist.  - Follow-up: Clinic with Dr. Wilhelm in 6 weeks with repeat radiographs.     - Disposition: Pending progress with therapies, pain control on orals, and medical stability; anticipate discharge to home on POD3-4.    David Barrett MD  Spine Fellow     PLEASE PAGE ME directly with any questions/concerns during regular weekday hours before 5 pm. If there is no response, if it is a weekend, or if it is during evening hours then please page the orthopedic surgery resident on call.    FOLLOWUP:    Future Appointments   Date Time Provider Department Center   2/15/2024  3:00 PM Tea Prather, SLP [facility]   2/20/2024 10:00 AM Dorothy Hong MD [facility]   2/29/2024 10:00 AM Panchito Saenz MD [facility]   3/6/2024  9:00 AM Sarika Palumbo RD [facility]   4/2/2024  9:00 AM Philip Wilhelm MD [facility]

## 2024-02-15 NOTE — PROGRESS NOTES
"Speech-Language Pathology: Clinical Swallow Evaluation      02/14/24 1630   Appointment Info   Signing Clinician's Name / Credentials (SLP) Tea Prather MA, CCC-SLP   General Information   Onset of Illness/Injury or Date of Surgery 02/12/24   Referring Physician Abhi Aaron, NP   Pertinent History of Current Problem Per provider notes, \"Zayra Ramirez is a 54 year old female s/p C2-C5 posterior instrumented fusion with C3 and C4 laminectomies on 2/12/24 with Dr. Wilhelm.\" Speech Therapy was consulted due to reports of post-op dysphagia.   General Observations Patient alert and cooperative. Sitting up in chair upon arrival.   Type of Evaluation   Type of Evaluation Swallow Evaluation   Oral Motor   Oral Musculature generally intact   Dentition (Oral Motor)   Dentition (Oral Motor) natural dentition;adequate dentition   Vocal Quality/Secretion Management (Oral Motor)   Vocal Quality (Oral Motor) WNL   Secretion Management (Oral Motor) WNL   Comment, Vocal Quality/Secretion Management (Oral Motor) Patient's vocal quality was clear. Mild vocal hoarseness noted.  (Per review of EMR, patient has a hoarse vocal quality and dysphonia at baseline. She attended outpatient Speech Therapy at the [facility] in 2021.)   General Swallowing Observations   Past History of Dysphagia Yes. Patient participated in a previous clinical swallow evaluation with Speech Therapy on 10/23/18. Evaluating SLP's report states the following: \"Ms. Ramirez is a 47 y/o woman who underwent wide local excision of the left retromolar trigone for a chronic ulcer on 10/3/18. She is seen in conjunction with ENT follow up visit this date. She reports difficulty swallowing with liquids characterized by frequent coughing on small and large sips. She reports no recent history of pneumonia and has noted slight improvement in function since she underwent surgery. Her PMHx also includes asthma, obstructive sleep apnea, postnasal drip, " "gastroesophageal reflux disease, obesity, tobacco use.\" At that time, evaluating SLP's clinical impressions were as follows: \"Ms. Ramirez demonstrates mild oropharyngeal dysphagia as characterized by multiple swallows on each trial thin liquid with single episode of coughing noted indicating aspiration/penetration. Adequate ROM and strength of oral mechanism demonstrated. Pt demonstrates harsh vocal quality at baseline. She demonstrates adequate jaw opening however reports some tightness when opening her mouth since surgery. She also notes her coughing has improved slightly since she had surgery however it continues to be quite bothersome. Recommend she continue with regular consistency diet and thin liquids. Sit upright for all po intake. Encouraged participation in speech therapy to maximize swallow function.\"   Comment, General Swallowing Observations Patient reports increased difficulty swallowing solid textures since ACDF surgery. She denies coughing/choking with liquids.   Respiratory Support nasal cannula  (2 LPM)   Current Diet/Method of Nutritional Intake (General Swallowing Observations, NIS) regular diet;thin liquids (level 0)   Swallowing Evaluation Clinical swallow evaluation   Clinical Swallow Evaluation   Feeding Assistance no assistance needed   Clinical Swallow Evaluation Textures Trialed thin liquids;pureed;solid foods;soft & bite-sized   Clinical Swallow Eval: Thin Liquid Texture Trial   Mode of Presentation, Thin Liquids cup;straw   Volume of Liquid or Food Presented >6 ounces   Oral Phase of Swallow WFL   Pharyngeal Phase of Swallow coughing/choking  (Audible swallow sounds noted consistently with sips of thin liquid. Delayed cough noted x1 with large sip taken by straw.)   Diagnostic Statement Patient presented with slightly delayed cough following large sip taken by straw. No further s/sx of aspiration were observed when patient was instructed to reduce her bolus size.   Clinical Swallow " Evaluation: Puree Solid Texture Trial   Mode of Presentation, Puree spoon;self-fed   Volume of Puree Presented 4 bites   Oral Phase, Puree WFL   Pharyngeal Phase, Puree intact   Diagnostic Statement No overt clinical s/sx of aspiration observed with puree consistency. Patient denied pain with swallow.   Clinical Swallow Eval: Soft & Bite Sized   Mode of Presentation self-fed   Volume Presented 2 bites  (Osmin cracker coated w/ applesauce)   Oral Phase WFL   Pharyngeal Phase intact   Diagnostic Statement No overt clinical s/sx of aspiration observed with a Soft & Bite Sized texture. Patient denied globus sensation after swallows.   Clinical Swallow Evaluation: Solid Food Texture Trial   Mode of Presentation self-fed   Volume Presented 1 bite  (Osmin cracker)   Oral Phase WFL   Pharyngeal Phase feeling of something stuck in throat;repeated swallows   Successful Strategies Trialed During Procedure other (see comments)  (Liquid wash)   Diagnostic Statement Patient reported that the item was sticking in her throat. It cleared when she was cued to take a sip of liquid.   Esophageal Phase of Swallow   Esophageal comments Patient has a history of GERD   Swallowing Recommendations   Diet Consistency Recommendations regular diet;thin liquids (level 0)   Supervision Level for Intake patient independent   Mode of Delivery Recommendations bolus size, small;food moistened;slow rate of intake   Swallowing Maneuver Recommendations alternate food and liquid intake   Monitoring/Assistance Required (Eating/Swallowing) stop eating activities when fatigue is present;monitor for cough or change in vocal quality with intake   Recommended Feeding/Eating Techniques (Swallow Eval) maintain upright posture during/after eating for 30 minutes;minimize distractions during oral intake   Medication Administration Recommendations, Swallowing (SLP) As tolerated   Instrumental Assessment Recommendations instrumental evaluation not recommended at  this time   General Therapy Interventions   Planned Therapy Interventions Dysphagia Treatment   Dysphagia treatment Instruction of safe swallow strategies;Compensatory strategies for swallowing   Clinical Impression   Criteria for Skilled Therapeutic Interventions Met (SLP Eval) Yes, treatment indicated   SLP Diagnosis Pharyngeal dysphagia  (in the setting of cervical spine surgery)   Risks & Benefits of therapy have been explained evaluation/treatment results reviewed;care plan/treatment goals reviewed;current/potential barriers reviewed;participants voiced agreement with care plan;participants included;patient   Clinical Impression Comments Clinical swallow evaluation completed per MD order. Patient presents with mild pharyngeal dysphagia characterized by globus sensation with regular texture solids as well as a cough x1 following large sip of liquid taken by straw. At this time, she appears appropriate to continue current diet of Regular textures and thin liquids with use of the safe/compensatory swallowing strategies listed above. Patient was instructed to self-select menu items that are naturally soft as well as to alternate bites of food with sips of liquid. She was also instructed to reduce her bolus size with both liquids and solids. Speech Therapy will continue to follow to monitor diet tolerance and reinforce strategies as needed.   SLP Total Evaluation Time   Eval: oral/pharyngeal swallow function, clinical swallow Minutes (87046) 12   SLP Goals   Therapy Frequency (SLP Eval) daily   SLP Predicted Duration/Target Date for Goal Attainment 02/21/24   SLP Goals Swallow   SLP: Safely tolerate diet without signs/symptoms of aspiration Regular diet;Thin liquids;With use of swallow precautions;With use of compensatory swallow strategies;Independently   Interventions   Interventions Quick Adds Swallowing Dysfunction   Swallowing Dysfunction &/or Oral Function for Feeding   Treatment of Swallowing Dysfunction &/or  Oral Function for Feeding Minutes (55864) 8   Treatment Detail/Skilled Intervention Patient was educated about post-op dysphagia following cervical spine surgery, as well as general course of and timeline for recovery. She was provided with safe/compensatory strategies to maximize safety and ease of oral intake. Patient asked appropriate questions which were answered to her verbalized understanding and satisfaction.   SLP Discharge Planning   SLP Plan F/U re: diet tolerance & reinforce safe/compensatory swallow strategies.   SLP Discharge Recommendation home   SLP Rationale for DC Rec Anticipate that Speech Therapy goal will be met prior to discharge.   SLP Brief overview of current status  Patient appears appropriate to continue current diet of Regular textures and thin liquids. To maximize safety & ease of oral intake, recommend that she self-select softer menu items, moisten drier foods by adding sauce, gravy, condiments, etc., eat slowly, take small bites/sips, chew thoroughly, and alternate bites/sips.   Total Session Time   Total Session Time (sum of timed and untimed services) 20

## 2024-02-15 NOTE — TELEPHONE ENCOUNTER
Please schedule this patient to be seen non-urgently at the next available follow up appointment in neurophthalmology clinic. If possible, she should be evaluated by an orthoptist for her decompensated left intermittent exotropia.     --    Message above per Dr. Tinoco.    Note to  to assist in scheduling first available with Dr. Razo/Dr. Aguiar per request    -- no separate orthoptic appt needed.  Orthoptist will work with pt during visit.    Kenneth To RN 4:51 PM 02/15/24     none

## 2024-02-16 ENCOUNTER — APPOINTMENT (OUTPATIENT)
Dept: SPEECH THERAPY | Facility: CLINIC | Age: 54
DRG: 472 | End: 2024-02-16
Attending: ORTHOPAEDIC SURGERY
Payer: COMMERCIAL

## 2024-02-16 ENCOUNTER — TELEPHONE (OUTPATIENT)
Dept: OPHTHALMOLOGY | Facility: CLINIC | Age: 54
End: 2024-02-16
Payer: COMMERCIAL

## 2024-02-16 VITALS
WEIGHT: 187.83 LBS | TEMPERATURE: 98.9 F | OXYGEN SATURATION: 92 % | HEART RATE: 90 BPM | SYSTOLIC BLOOD PRESSURE: 118 MMHG | HEIGHT: 64 IN | DIASTOLIC BLOOD PRESSURE: 69 MMHG | RESPIRATION RATE: 22 BRPM | BODY MASS INDEX: 32.07 KG/M2

## 2024-02-16 LAB
ANION GAP SERPL CALCULATED.3IONS-SCNC: 5 MMOL/L (ref 7–15)
BUN SERPL-MCNC: 11 MG/DL (ref 6–20)
CALCIUM SERPL-MCNC: 8.8 MG/DL (ref 8.6–10)
CHLORIDE SERPL-SCNC: 92 MMOL/L (ref 98–107)
CREAT SERPL-MCNC: 0.67 MG/DL (ref 0.51–0.95)
DEPRECATED HCO3 PLAS-SCNC: 36 MMOL/L (ref 22–29)
EGFRCR SERPLBLD CKD-EPI 2021: >90 ML/MIN/1.73M2
GLUCOSE SERPL-MCNC: 106 MG/DL (ref 70–99)
POTASSIUM SERPL-SCNC: 4.3 MMOL/L (ref 3.4–5.3)
SODIUM SERPL-SCNC: 133 MMOL/L (ref 135–145)

## 2024-02-16 PROCEDURE — 250N000013 HC RX MED GY IP 250 OP 250 PS 637: Performed by: NURSE PRACTITIONER

## 2024-02-16 PROCEDURE — 250N000013 HC RX MED GY IP 250 OP 250 PS 637: Performed by: PHYSICIAN ASSISTANT

## 2024-02-16 PROCEDURE — 36415 COLL VENOUS BLD VENIPUNCTURE: CPT | Performed by: INTERNAL MEDICINE

## 2024-02-16 PROCEDURE — 250N000013 HC RX MED GY IP 250 OP 250 PS 637

## 2024-02-16 PROCEDURE — 92526 ORAL FUNCTION THERAPY: CPT | Mod: GN | Performed by: SPEECH-LANGUAGE PATHOLOGIST

## 2024-02-16 PROCEDURE — 80048 BASIC METABOLIC PNL TOTAL CA: CPT | Performed by: INTERNAL MEDICINE

## 2024-02-16 PROCEDURE — 250N000013 HC RX MED GY IP 250 OP 250 PS 637: Performed by: ORTHOPAEDIC SURGERY

## 2024-02-16 PROCEDURE — 99232 SBSQ HOSP IP/OBS MODERATE 35: CPT | Performed by: INTERNAL MEDICINE

## 2024-02-16 RX ORDER — BISACODYL 10 MG
10 SUPPOSITORY, RECTAL RECTAL ONCE
Status: COMPLETED | OUTPATIENT
Start: 2024-02-16 | End: 2024-02-16

## 2024-02-16 RX ORDER — METHOCARBAMOL 750 MG/1
750 TABLET, FILM COATED ORAL 3 TIMES DAILY
Qty: 35 TABLET | Refills: 0 | Status: SHIPPED | OUTPATIENT
Start: 2024-02-16 | End: 2024-02-26

## 2024-02-16 RX ORDER — POLYETHYLENE GLYCOL 3350 17 G/17G
17 POWDER, FOR SOLUTION ORAL DAILY
COMMUNITY
Start: 2024-02-16

## 2024-02-16 RX ORDER — LIDOCAINE 4 G/G
1 PATCH TOPICAL EVERY 24 HOURS
COMMUNITY
Start: 2024-02-16 | End: 2024-02-26

## 2024-02-16 RX ORDER — OXYCODONE HYDROCHLORIDE 10 MG/1
10-15 TABLET ORAL
Qty: 40 TABLET | Refills: 0 | Status: SHIPPED | OUTPATIENT
Start: 2024-02-16 | End: 2024-03-15

## 2024-02-16 RX ADMIN — POLYETHYLENE GLYCOL 3350 17 G: 17 POWDER, FOR SOLUTION ORAL at 07:58

## 2024-02-16 RX ADMIN — BISACODYL 10 MG: 10 SUPPOSITORY RECTAL at 10:28

## 2024-02-16 RX ADMIN — DIPHENHYDRAMINE HYDROCHLORIDE 25 MG: 25 CAPSULE ORAL at 12:04

## 2024-02-16 RX ADMIN — OXYCODONE HYDROCHLORIDE 15 MG: 10 TABLET ORAL at 00:38

## 2024-02-16 RX ADMIN — PREGABALIN 150 MG: 75 CAPSULE ORAL at 07:57

## 2024-02-16 RX ADMIN — OXYCODONE HYDROCHLORIDE 15 MG: 10 TABLET ORAL at 05:02

## 2024-02-16 RX ADMIN — HYDROCHLOROTHIAZIDE 12.5 MG: 12.5 TABLET ORAL at 07:56

## 2024-02-16 RX ADMIN — OXYCODONE HYDROCHLORIDE 15 MG: 10 TABLET ORAL at 15:44

## 2024-02-16 RX ADMIN — OXYCODONE HYDROCHLORIDE 15 MG: 10 TABLET ORAL at 08:02

## 2024-02-16 RX ADMIN — AZELASTINE HYDROCHLORIDE 2 SPRAY: 137 SPRAY, METERED NASAL at 08:02

## 2024-02-16 RX ADMIN — METHOCARBAMOL 750 MG: 750 TABLET ORAL at 07:55

## 2024-02-16 RX ADMIN — DIPHENHYDRAMINE HYDROCHLORIDE 25 MG: 25 CAPSULE ORAL at 00:38

## 2024-02-16 RX ADMIN — FAMOTIDINE 20 MG: 20 TABLET ORAL at 07:57

## 2024-02-16 RX ADMIN — ARIPIPRAZOLE 5 MG: 5 TABLET ORAL at 07:58

## 2024-02-16 RX ADMIN — BUSPIRONE HYDROCHLORIDE 30 MG: 15 TABLET ORAL at 07:57

## 2024-02-16 RX ADMIN — METHOCARBAMOL 750 MG: 750 TABLET ORAL at 13:47

## 2024-02-16 RX ADMIN — LISINOPRIL 10 MG: 10 TABLET ORAL at 07:57

## 2024-02-16 RX ADMIN — MAGNESIUM HYDROXIDE 30 ML: 400 SUSPENSION ORAL at 08:16

## 2024-02-16 RX ADMIN — OXYCODONE HYDROCHLORIDE 15 MG: 10 TABLET ORAL at 12:04

## 2024-02-16 RX ADMIN — HYDROXYCHLOROQUINE SULFATE 200 MG: 200 TABLET ORAL at 07:56

## 2024-02-16 RX ADMIN — SENNOSIDES AND DOCUSATE SODIUM 1 TABLET: 8.6; 5 TABLET ORAL at 07:57

## 2024-02-16 ASSESSMENT — ACTIVITIES OF DAILY LIVING (ADL)
ADLS_ACUITY_SCORE: 31

## 2024-02-16 NOTE — CARE PLAN
Speech Language Therapy Discharge Summary    Reason for therapy discharge:    All goals and outcomes met, no further needs identified.    Progress towards therapy goal(s). See goals on Care Plan in [facility] electronic health record for goal details.  Goals met    Therapy recommendation(s):    No further therapy is recommended. If patient notices a decline in swallow function in the next few weeks, can reach out to primary care provider for OP SLP Dysphagia consult.

## 2024-02-16 NOTE — PLAN OF CARE
Goal Outcome Evaluation:    Alert and oriented X 4, Continent of bowel and bladder no BM ambulated to the restroom multiple times with standby assist. Denies chest pain, SOB, N/T, N/V. Pain manage  with PRN oxycodone 15 mg couple of times.  No change overnight. Continue POC,

## 2024-02-16 NOTE — PLAN OF CARE
Problem: Adult Inpatient Plan of Care  Goal: Plan of Care Review    Outcome: Progressing  Flowsheets (Taken 2/15/2024 1844)  Outcome Evaluation: Pt utilziing PRN 15 mg of Oxy now changed to q 3 hours to manage pain in neck, pt states that it helps better. Passing gas but has yet to have a BM. Drain removed earlier this morning. Dressing C/D/I, [place] J collar on, pt complains of itching, takes benadryl frequently due to this. She states double vision is getting better- opthomolgy consulted w/ pt along w/ pain team. Possible d/c tomorrow. Continue POC.  Plan of Care Reviewed With: patient  Overall Patient Progress: improving  Goal: Absence of Hospital-Acquired Illness or Injury  Outcome: Progressing  Intervention: Identify and Manage Fall Risk  Recent Flowsheet Documentation  Taken 2/15/2024 0900 by Renita Herrmann RN  Safety Promotion/Fall Prevention:   activity supervised   assistive device/personal items within reach   lighting adjusted   mobility aid in reach   nonskid shoes/slippers when out of bed   room near nurse's station   toileting scheduled   room organization consistent  Intervention: Prevent Skin Injury  Recent Flowsheet Documentation  Taken 2/15/2024 0900 by Renita Herrmann RN  Body Position: position changed independently  Intervention: Prevent and Manage VTE (Venous Thromboembolism) Risk  Recent Flowsheet Documentation  Taken 2/15/2024 0900 by Renita Herrmann RN  VTE Prevention/Management: SCDs (sequential compression devices) off  Intervention: Prevent Infection  Recent Flowsheet Documentation  Taken 2/15/2024 0900 by Renita Herrmann RN  Infection Prevention: hand hygiene promoted  Goal: Optimal Comfort and Wellbeing  Outcome: Progressing  Intervention: Monitor Pain and Promote Comfort  Recent Flowsheet Documentation  Taken 2/15/2024 1011 by Renita Herrmann RN  Pain Management Interventions:   medication (see MAR)   cold applied   back rub   pillow support provided   prescribed exercises  encouraged   quiet environment facilitated   repositioned   rest  Goal: Readiness for Transition of Care  Outcome: Progressing     Problem: Spinal Surgery  Goal: Optimal Coping with Surgery  Outcome: Progressing  Goal: Effective Bowel Elimination  Outcome: Not Progressing  Goal: Optimal Functional Ability  Outcome: Progressing  Intervention: Optimize Functional Status  Recent Flowsheet Documentation  Taken 2/15/2024 0900 by Renita Herrmann RN  Activity Management: activity adjusted per tolerance  Positioning/Transfer Devices:   pillows   in use  Goal: Absence of Infection Signs and Symptoms  Outcome: Progressing  Intervention: Prevent or Manage Infection  Recent Flowsheet Documentation  Taken 2/15/2024 0900 by Renita Herrmann RN  Infection Prevention: hand hygiene promoted  Goal: Optimal Neurologic Function  Outcome: Progressing  Intervention: Optimize Neurologic Function  Recent Flowsheet Documentation  Taken 2/15/2024 0900 by Renita Herrmann RN  Body Position: position changed independently  Goal: Anesthesia/Sedation Recovery  Intervention: Optimize Anesthesia Recovery  Recent Flowsheet Documentation  Taken 2/15/2024 0900 by Renita Herrmann RN  Safety Promotion/Fall Prevention:   activity supervised   assistive device/personal items within reach   lighting adjusted   mobility aid in reach   nonskid shoes/slippers when out of bed   room near nurse's station   toileting scheduled   room organization consistent  Administration (IS): instruction provided, follow-up  Patient Tolerance (IS): good  Goal: Optimal Pain Control and Function  Outcome: Progressing  Intervention: Prevent or Manage Pain  Recent Flowsheet Documentation  Taken 2/15/2024 1011 by Renita Herrmann RN  Pain Management Interventions:   medication (see MAR)   cold applied   back rub   pillow support provided   prescribed exercises encouraged   quiet environment facilitated   repositioned   rest  Goal: Effective Oxygenation and Ventilation  Outcome:  Progressing  Intervention: Optimize Oxygenation and Ventilation  Recent Flowsheet Documentation  Taken 2/15/2024 0900 by Renita Herrmann, RN  Head of Bed (HOB) Positioning: HOB at 20-30 degrees   Goal Outcome Evaluation:      Plan of Care Reviewed With: patient    Overall Patient Progress: improving    Outcome Evaluation: Pt utilziing PRN 15 mg of Oxy now changed to q 3 hours to manage pain in neck, pt states that it helps better. Passing gas but has yet to have a BM. Drain removed earlier this morning. Dressing C/D/I, [place] collar on, pt complains of itching, takes benadryl frequently due to this. She states double vision is getting better- opthomolgy consulted w/ pt along w/ pain team. Possible d/c tomorrow. Continue POC.

## 2024-02-16 NOTE — PLAN OF CARE
Problem: Adult Inpatient Plan of Care  Outcome: Adequate for Care Transition  Flowsheets (Taken 2/16/2024 1504)  Outcome Evaluation: Pt states that he pain is being managed better today. Pt started to have small stools today after suppository given. Encouraged ambulation and taking stool softners and other bowel meds such as milk of mag. Pt states that her swallowing also has been better. Pt ambulated halls today, tolerated fairly well. Pillow cases have been placed as a barrier in between [place]-J brace, pt states this helps somewhat but still asking for benadryl due to the itching, no swelling or increased redness noted. Pt is anticipated to d/c this evening.  Plan of Care Reviewed With: patient  Overall Patient Progress: improving  Goal: Absence of Hospital-Acquired Illness or Injury  Outcome: Adequate for Care Transition  Intervention: Identify and Manage Fall Risk  Recent Flowsheet Documentation  Taken 2/16/2024 1000 by Renita Herrmann RN  Safety Promotion/Fall Prevention:   activity supervised   assistive device/personal items within reach   lighting adjusted   mobility aid in reach   nonskid shoes/slippers when out of bed   room near nurse's station   toileting scheduled   room organization consistent  Intervention: Prevent Skin Injury  Recent Flowsheet Documentation  Taken 2/16/2024 1000 by Renita Herrmann RN  Body Position: position changed independently  Intervention: Prevent and Manage VTE (Venous Thromboembolism) Risk  Recent Flowsheet Documentation  Taken 2/16/2024 1000 by Renita Herrmann RN  VTE Prevention/Management: SCDs (sequential compression devices) off  Intervention: Prevent Infection  Recent Flowsheet Documentation  Taken 2/16/2024 1000 by Renita Herrmann RN  Infection Prevention: hand hygiene promoted  Goal: Optimal Comfort and Wellbeing  Outcome: Adequate for Care Transition  Intervention: Monitor Pain and Promote Comfort  Recent Flowsheet Documentation  Taken 2/16/2024 1100 by Montez  Renita PADRON RN  Pain Management Interventions:   medication (see MAR)   cold applied   back rub   pillow support provided   prescribed exercises encouraged   quiet environment facilitated   repositioned   rest  Goal: Readiness for Transition of Care  Outcome: Adequate for Care Transition     Problem: Spinal Surgery  Goal: Optimal Coping with Surgery  Outcome: Adequate for Care Transition  Goal: Effective Bowel Elimination  Outcome: Adequate for Care Transition  Goal: Optimal Functional Ability  Outcome: Adequate for Care Transition  Intervention: Optimize Functional Status  Recent Flowsheet Documentation  Taken 2/16/2024 1000 by Renita Herrmann RN  Activity Management: activity adjusted per tolerance  Positioning/Transfer Devices:   pillows   in use  Goal: Absence of Infection Signs and Symptoms  Outcome: Adequate for Care Transition  Intervention: Prevent or Manage Infection  Recent Flowsheet Documentation  Taken 2/16/2024 1000 by Renita Herrmann RN  Infection Prevention: hand hygiene promoted  Goal: Optimal Neurologic Function  Outcome: Adequate for Care Transition  Intervention: Optimize Neurologic Function  Recent Flowsheet Documentation  Taken 2/16/2024 1000 by Renita Herrmann RN  Body Position: position changed independently  Goal: Anesthesia/Sedation Recovery  Intervention: Optimize Anesthesia Recovery  Recent Flowsheet Documentation  Taken 2/16/2024 1000 by Renita Herrmann RN  Safety Promotion/Fall Prevention:   activity supervised   assistive device/personal items within reach   lighting adjusted   mobility aid in reach   nonskid shoes/slippers when out of bed   room near nurse's station   toileting scheduled   room organization consistent  Administration (IS): instruction provided, follow-up  Patient Tolerance (IS): good  Goal: Optimal Pain Control and Function  Outcome: Adequate for Care Transition  Intervention: Prevent or Manage Pain  Recent Flowsheet Documentation  Taken 2/16/2024 1100 by Renita Hrermann  NEREIDA RN  Pain Management Interventions:   medication (see MAR)   cold applied   back rub   pillow support provided   prescribed exercises encouraged   quiet environment facilitated   repositioned   rest  Goal: Effective Oxygenation and Ventilation  Outcome: Adequate for Care Transition   Goal Outcome Evaluation:      Plan of Care Reviewed With: patient    Overall Patient Progress: improvingOverall Patient Progress: improving    Outcome Evaluation: Pt states that he pain is being managed better today. Pt started to have small stools today after suppository given. Encouraged ambulation and taking stool softners and other bowel meds such as milk of mag. Pt states that her swallowing also has been better. Pt ambulated halls today, tolerated fairly well. Pillow cases have been placed as a barrier in between [place]-NEREIDA brace, pt states this helps somewhat but still asking for benadryl due to the itching, no swelling or increased redness noted. Pt is anticipated to d/c this evening.

## 2024-02-16 NOTE — PROGRESS NOTES
"Orthopedic Surgery Progress Note: 02/16/2024    Subjective:   No acute events overnight. Pain better this morning. Drain removed yesterday. Worked with therapies and was able to ambulate without concern. Still has not had a BM, but is passing flatus. No issues with tolerating PO intake.     Objective:   /80 (BP Location: Left arm)   Pulse 68   Temp 98.8  F (37.1  C) (Oral)   Resp 16   Ht 1.626 m (5' 4.02\")   Wt 85.2 kg (187 lb 13.3 oz)   LMP  (LMP Unknown)   SpO2 92%   BMI 32.23 kg/m    No intake/output data recorded.  General: NAD. Resting comfortably in bed.  Respiratory: Nonlabored breathing  Musculoskeletal:    Motor Strength Right Left   Deltoids: C5 5/5 5/5   Biceps: C5 5/5 5/5   Brachialis: C6 5/5 5/5   Wrist extension: C6 5/5 5/5   Triceps: C7  5/5 5/5   Wrist flexion: C7 5/5 5/5    strength: C8 5/5 5/5   Hand intrinsics: T1 5/5 5/5      Sensation from C4-L1 is preserved.    Laboratory Data:  Lab Results   Component Value Date    WBC 5.7 01/16/2024    HGB 11.6 (L) 02/13/2024     01/16/2024    INR 1.09 08/18/2019         Assessment & Plan:   Zayra Ramirez is a 54 year old female s/p C2-C5 posterior instrumented fusion with C3 and C4 laminectomies on 2/12/24 with Dr. Wilhelm. Patient is doing well today, pain is better controlled. Drains have been removed and radiographs taken. Clear for discharge once pain is adequately managed.       Goals for today:  - PT/OT   - Pain control   - Likely discharge today pending pain control and patient comfort     Spine Primary  - Activity: Up with assist until independent. No excessive bending or twisting. No lifting >10 lbs x 6 weeks. No James lift for transfers.   - Weightbearing Status: WBAT.  - Pain Management: Transition from IV to PO as tolerated. No NSAIDs.   - Diet: Begin with clear fluids and progress diet as tolerated.   - DVT Prophylaxis: SCDs only. No chemical DVT prophylaxis needed.  - Imaging: XR Upright PTDC; completed  - Labs: Labs " PRN.  - Bracing/Splinting: [place] J  - Dressings: Keep Aquacel C/D/I x 7 days.  - Drains: removed  - Physical Therapy/Occupational Therapy: Evaluation and treatment.  - Consults: Hospitalist.  - Follow-up: Clinic with Dr. Wilhelm in 6 weeks with repeat radiographs.     - Disposition: Anticipate discharge today to home.    ------------------------------------------------------------------------------------------    Respectfully,    Bran Marquez MD  Orthopedic Surgery PGY1  [phone]    Please page me directly with any questions/concerns during regular weekday hours before 5 pm. If there is no response, if it is a weekend, or if it is during evening hours then please page the orthopedic surgery resident on call.      FOLLOWUP:    Future Appointments   Date Time Provider Department [place]   2/16/2024  1:00 PM UR SLP WAITLIST [facility]   2/20/2024 10:00 AM Dorothy Hong MD SWFMOB [facility]   2/29/2024 10:00 AM Panchito Saenz MD CSR[facility]   3/6/2024  9:00 AM Sarika Palumbo RD MDGSBI [facility]   4/2/2024  9:00 AM Philip Wilhelm MD [facility]

## 2024-02-16 NOTE — PROGRESS NOTES
[facility]    Medicine Progress Note - Hospitalist Service, GOLD TEAM 17    Date of Admission:  2/12/2024    Assessment & Plan   A: Patient is a 53 y/o woman who has a past medical history significant for asthma, interstitial lung disease, seronegative inflammatory arthritis, obstructive sleep apnea, hypertension and GERD. Patient underwent posterior instrumented spinal fusion C2-5, laminectomies C3-4, use of local autograft and crushed cancellous allograft on 12-Feb-2024 for cervical pseudoarthrosis, cervical radiculopathy and cervical stenosis with myelopathy.     Patient noted having diplopia. Patient has been seen by Ophthalmology and diplopia appears to be secondary to decompensated left intermittent exotropia.     Patient has mild hyponatremia.     P:  1.) Cervical pseudoarthrosis, cervical radiculopathy and cervical stenosis with myelopathy s/p surgical intervention:  - Patient on scheduled acetaminophen. Patient on acetaminophen, IV hydromorphone and oxycodone as needed.     2.) Diplopia secondary to decompensated left intermittent exotropia; improving:  - To f/u with Neurophthalmology as outpatient.  - Artificial tears four times a day as needed for dry eye per Ophthalmology recommendations.      3.) Hypoxemia, resolved:  - Oxygen support as needed.     4.) Hyponatremia:  - Hydrochlorothiazide to be stopped.  - BMP to be checked as outpatient.    5.) Hypertension:  - Patient to stop hydrochlorothiazide/lisinopril for now in light of hyponatremia.  - Patient to f/u as outpatient for further management.     6.) Interstitial lung disease:  - To f/u with Pulmonology as outpatient for further management.     7.) Moderate persistent asthma, uncomplicated:  - Monitoring for symptoms.     8.) Restless legs syndrome:  - Patient on requip and lyrica.     9.) Bipolar disorder; depression:  - Patient on abilify, duloxetine and buspar.     10.) GERD:  - Patient on PPI.    "  11.) Seronegative inflammatory arthritis:  - Patient on hydroxychloroquine.   - To f/u with Rheumatology as outpatient.     12.) Obstructive sleep apnea:  - Patient using CPAP.             Diet: Advance Diet as Tolerated: Regular Diet Adult  Snacks/Supplements Adult: [place]; With Meals  Snacks/Supplements Adult: Ensure Max Protein (bariatric); With Meals    Hicks Catheter: Not present  Lines: None     Cardiac Monitoring: None  Code Status: Full Code      Clinically Significant Risk Factors                  # Hypertension: Noted on problem list        # Obesity: Estimated body mass index is 32.23 kg/m  as calculated from the following:    Height as of this encounter: 1.626 m (5' 4.02\").    Weight as of this encounter: 85.2 kg (187 lb 13.3 oz).      # Asthma: noted on problem list        Disposition Plan      Expected Discharge Date: 02/16/2024                    Moody Bernstein MD  Hospitalist Service, GOLD TEAM [address] [place] [address]  Securely message with [employer] (more info)  Text page via [facility] Paging/Directory   See signed in provider for up to date coverage information  ______________________________________________________________________    Interval History   Patient noted neck pain better controlled. Patient noted diplopia improved. Patient noted no bowel movements yet. Patient noted no fever, no chills, no nausea, no vomiting and no abdominal pain. Patient noted still passing gas. Patient noted no new problems.     Physical Exam   Vital Signs: Temp: 98.9  F (37.2  C) Temp src: Oral BP: 118/69 Pulse: 90   Resp: 22        Weight: 187 lbs 13.31 oz    General: Patient comfortable, NAD.  Heart: RRR, S1 S2 w/o murmurs.  Abdomen: Soft, nontender.    Labs noted.  Sodium 133    Medical Decision Making             Data     "

## 2024-02-16 NOTE — PROGRESS NOTES
Care Management Follow Up    Length of Stay (days): 4    Expected Discharge Date: 02/16/2024     Concerns to be Addressed:       Patient plan of care discussed at interdisciplinary rounds: Yes    Anticipated Discharge Disposition:  home with assist     Anticipated Discharge Services:  none  Anticipated Discharge DME:  [place] [facility]    Patient/family educated on Medicare website which has current facility and service quality ratings:  N/A  Education Provided on the Discharge Plan:  patient is aware  Patient/Family in Agreement with the Plan:  yes    Referrals Placed by CM/SW:  none  Private pay costs discussed: Not applicable    Additional Information:  Writer called Connie Guallpa care coordinator and left detailed message about patient's discharge today.    Lynn Mason, MSN, APRN, [address] [phone]  Nurse Coordinator  Securely message with [employer] (more info)

## 2024-02-18 ENCOUNTER — PATIENT OUTREACH (OUTPATIENT)
Dept: CARE COORDINATION | Facility: CLINIC | Age: 54
End: 2024-02-18
Payer: COMMERCIAL

## 2024-02-18 NOTE — PROGRESS NOTES
Connected Care Resource Center:   [facility] Resource Center Contact  [facility]/Voicemail     Clinical Data: Post-Discharge Outreach     Outreach attempted x 2.  Left message on patient's voicemail, providing [facility]'s central phone number of [phone] ([phone]) for questions/concerns and/or to schedule an appt with an [facility] provider, if they do not have a PCP.      Plan:  [facility] will do no further outreaches at this time.       Agnieszka March MA  Connected Care Resource Center, [facility]    *Connected Care Resource Team does NOT follow patient ongoing. Referrals are identified based on internal discharge reports and the outreach is to ensure patient has an understanding of their discharge instructions.

## 2024-02-19 NOTE — DISCHARGE SUMMARY
ORTHOPAEDIC DISCHARGE SUMMARY    Date of Admission: 2/12/2024  Date of Discharge: 2/16/2024  6:50 PM  Disposition: Home  Staff Physician: Philip Wilhelm MD    DISCHARGE DIAGNOSIS:   See op note     PROCEDURES:    2/12/2024  IN ALLOGRAFT FOR SPINE SURGERY ONLY MORSELIZED [20930] (Posterior instrumented spinal fusion cervical 2-5; laminectomies cervical 3-4; use of local autograft and crushed cancellous allograft.)  IN ALLOGRAFT FOR SPINE SURGERY ONLY STRUCTURAL [55478]  IN AUTOGRAFT SPINE SURGERY LOCAL FROM SAME INCISION [67127]  IN AUTOGRAFT SPINE SURGERY MORSELIZED SEP INCISION [88477]  IN AUTOGRAFT SPINE SURGERY BICORT/TRICORT SEP INCISION [20938]  IN CRANIOCERV FUSN,POST TECHNIQUE [94342]  IN CERV C1-2 FUSN,POSTER TECH [71358]  IN CERV FUSN,BELOW C2,POST TECH [93278]  IN SPINE FUSN,POST TECH,EA ADDNL SGMT [99484]  IN STEREOTACTIC COMP ASSIST PROC,SPINAL [47205]   Procedures:    * Posterior instrumented spinal fusion cervical 2-5; laminectomies cervical 3-4; use of local autograft and crushed cancellous allograft.       HOSPITAL COURSE:   54 year old female with neck pain, pseduoarthrosis following anterior spine fusion.  Patient elected to undergo the procedures listed above on 2/12/2024 after having treatment options, alternatives, risks, and benefits explained.       Pre-operative antibiotics were given and continued intra-operatively as well as post-operatively.  Hospital course without complication.   At time of discharge, patient was tolerating PO pain control, voiding, and eating a pre-operative diet.  Patient was discharged to home.    FOLLOW UP:      Follow up with Dr. Philip Wilhelm MD    Future Appointments   Date Time Provider Department Center   2/20/2024 10:00 AM Dorothy Hong MD SWFMOB [facility] STWT   2/29/2024 10:00 AM Panchito Saenz MD CSRHEUM CS   3/6/2024  9:00 AM Sarika Palumbo RD MDGSBI [facility] MPLW   3/12/2024  7:30 AM Lino Razo MD UUEYE [facility] MSA CLIN   4/2/2024  9:00 AM  "Philip Wilhelm MD UOR [facility] ORTHOPEDICS - Located 4th Floor (4D ) in the Clinics and Surgery Center at [address] [place] [id number].  parking is very convenient and highly recommended.  is a $7 flat rate fee; no tips accepted. Both  and self parkers should enter the main arrival plaza from [facility]; parking attendants will direct you based on your parking preference.     Department Address: [address] [place] [address] 4th Floor [facility] [address]     [facility] Orthopedic Scheduling contact number: [phone]    Call if you haven't heard regarding these appointments within 7 days of discharge.      PHYSICAL EXAMINATION:  General: No acute distress  Vital Signs: /69   Pulse 90   Temp 98.9  F (37.2  C) (Oral)   Resp 22   Ht 1.626 m (5' 4.02\")   Wt 85.2 kg (187 lb 13.3 oz)   LMP  (LMP Unknown)   SpO2 92%   BMI 32.23 kg/m        Pulmonary:  Nonlabored  Cardiovascular:  Intact distal pulses, capillary refill <3s  Musculoskeletal:      Motor Strength Right Left   Deltoids: C5 5/5 5/5   Biceps: C5 5/5 5/5   Brachialis: C6 5/5 5/5   Wrist extension: C6 5/5 5/5   Triceps: C7  5/5 5/5   Wrist flexion: C7 5/5 5/5    strength: C8 5/5 5/5   Hand intrinsics: T1 5/5 5/5      Sensation from C4-L1 is preserved.          Discharge Procedure Orders   Basic metabolic panel  (Ca, Cl, CO2, Creat, Gluc, K, Na, BUN)   Standing Status: Future Standing Exp. Date: 02/16/25   Order Comments: [facility] on 20-Feb-2024: For hyponatremia.     Adult Eye  Referral   Standing Status: Future   Referral Priority: Routine: Next available opening Referral Type: Consultation   Requested Specialty: Ophthalmology   Number of Visits Requested: 1     Adult [facility] Follow-up and recommended labs and tests   Order Comments: Follow up with PCP in 1 week.    Appointments on [place] and/or [facility] (with [facility] or [facility] provider or service). Call [phone] if you haven't " "heard regarding these appointments within 7 days of discharge.     Reason for your hospital stay   Order Comments: Cervical fusion     Brief Discharge Instructions   Order Comments: Post-operative Discharge Instructions:     WOUND CARE:  You have a dressing on your incision which can be worn for up to 7 days, and it can be worn in the shower. After the dressing has been removed, you do not need a dressing. There is \"surgical glue\" directly over the incision. This will fall off on its own, which can take up to 2 weeks. It is okay to shower and wash gently with soap and water. Do not soak in the bath. No pools, hot tubs, or lakes for 6 weeks.      After surgery, you may have a sensitive scar.  When the incision has fully healed, you may massage the scar to decrease sensitivity and help break down scar tissue. Do this up to 4 times per day.     DIET & EXERCISE:  - When you get home, you may resume your normal diet as tolerated. You may not be very hungry but try to eat small healthy meals to help you heal. Remember to drink plenty of water/fluids to help keep you hydrated.     - You will be seen in the clinic at 6 weeks following surgery. You will not need to attend physical therapy during this time. You can focus on cardiovascular fitness by walking as much as you can tolerate. Avoid bending, lifting, and twisting. Your weight lifting restriction is 10 pounds until your first follow-up appointment.       PAIN MEDICATIONS:  For postoperative pain control, we have prescribed a variety of medications. Tylenol has been prescribed and should be taken as part of the complete pain management regimen. You may also have been prescribed a muscle relaxer to be taken as needed for muscle spasms. Other pain management techniques include icing the surgical area (for 15 minutes on, 15 minutes off) throughout the day to help with inflammation. Avoid NSAIDs (ibuprofen, Aleve, Advil, etc) for the first 12 weeks after surgery, unless " approved by your surgeon.     You also received a prescription for a opioid medication.  This should be taken for the first few weeks following surgery.  As pain improves, decrease the amount you take (see tapering plan below). Opioid have numerous side effects including nausea, constipation, and drowsiness. If you experience nausea, this may be relieved by taking the medication with food or a light meal. To avoid constipation, please use an over-the-counter stool softeners and drink lots of water and eat fruits and vegetables. No operating heavy machinery or driving an automobile while on opioid medications.        Tapering opioids: As your pain symptoms improve, focus your efforts on decreasing (tapering) use of opioid medications. The most successful tapering strategy is to first, decrease the number of tablets you take every 4-6 hours to the minimum prescribed. Then, increase the amount of time between doses.  For example:  First, taper to   or 1 tablet every 4-6 hours.  Then, taper to   or 1 tablet every 6-8 hours.  Then, taper to   or 1 tablet every 8-10 hours.  Then, taper to   or 1 tablet every 10-12 hours.  Then, taper to   or 1 tablet at bedtime.  The bedtime dose can help with comfort during sleep and is typically the last dose to be discontinued after surgery.     Call the orthopedic clinic at [phone] several business days in advance of when you need a refill. Early refills will not be provided, and you may not take more than what is prescribed. Inform the nurse how much of the medication you are taking and how many tablets you have left.     WHEN TO CALL:  Please call or return if you experience the following:  - Fevers (temperature greater than 100.4 degrees Fahrenheit).  - Pain that is getting worse or does not respond to pain medications.  - Drainage from your wound.  - Increasing redness about the wound.  - Changes in strength or sensation to your arms/legs.   - Any other worrisome symptoms.    "  You may reach the clinic by dialing [phone].  After hours, you may reach the resident on call by dialing [phone].      When to call - Contact Surgeon Team   Order Comments: You may experience symptoms that require follow-up before your scheduled appointment. Refer to the \"Stoplight Tool\" for instructions on when to contact your Surgeon Team if you are concerned about pain control, blood clots, constipation, or if you are unable to urinate.     When to call - Reach out to Urgent Care   Order Comments: If you are not able to reach your Surgeon Team and you need immediate care, go to the Orthopedic Walk-in Clinic or Urgent Care at your Surgeon's office.  Do NOT go to the Emergency Room unless you have shortness of breath, chest pain, or other signs of a medical emergency.     When to call - Reasons to Call 911   Order Comments: Call 911 immediately if you experience sudden-onset chest pain, arm weakness/numbness, slurred speech, or shortness of breath     Discharge Instruction - Breathing exercises   Order Comments: Perform breathing exercises using your Incentive Spirometer 10 times per hour while awake for 2 weeks.     Symptoms - Fever Management   Order Comments: A low grade fever can be expected after surgery.  Use acetaminophen (TYLENOL) as needed for fever management.  Contact your Surgeon Team if you have a fever greater than 101.5 F, chills, and/or night sweats.     Symptoms - Constipation management   Order Comments: Constipation (hard, dry bowel movements) is expected after surgery due to the combination of being less active, the anesthetic, and the opioid pain medication.  You can do the following to help reduce constipation:  ~  FLUIDS:  Drink clear liquids (water or Gatorade), or juice (apple/prune).  ~  DIET:  Eat a fiber rich diet.    ~  ACTIVITY:  Get up and move around several times a day.  Increase your activity as you are able.  MEDICATIONS:  Reduce the risk of constipation by starting " medications before you are constipated.  You can take Miralax   (1 packet as directed) and/or a stool softener (Senokot 1-2 tablets 1-2 times a day).  If you already have constipation and these medications are not working, you can get magnesium citrate and use as directed.  If you continue to have constipation you can try an over the counter suppository or enema.  Call your Surgeon Team if it has been greater than 3 days since your last bowel movement.     Symptoms - Reduced Urine Output   Order Comments: Changes in the amount of fluids you drank before and after surgery may result in problems urinating.  It is important to stay well-hydrated after surgery and drink plenty of water. If it has been greater than 8 hours since you have urinated despite drinking plenty of water, call your Surgeon Team.     Activity - Exercises to prevent blood clots   Order Comments: Unless otherwise directed by your Surgeon team, perform the following exercises at least three times per day for the first four weeks after surgery to prevent blood clots in your legs: 1) Point and flex your feet (Ankle Pumps), 2) Move your ankle around in big circles, 3) Wiggle your toes, 4) Walk, even for short distances, several times a day, will help decrease the risk of blood clots.     Order Specific Question Answer Comments   Is discharge order? Yes      Comfort and Pain Management - Pain after Surgery   Order Comments: Pain after surgery is normal and expected.  You will have some amount of pain for several weeks after surgery.  Your pain will improve with time.  There are several things you can do to help reduce your pain including: rest, ice, elevation, and using pain medications as needed. Contact your Surgeon Team if you have pain that persists or worsens after surgery despite rest, ice, elevation, and taking your medication(s) as prescribed. Contact your Surgeon Team if you have new numbness, tingling, or weakness in your operative extremity.      Comfort and Pain Management - Swelling after Surgery   Order Comments: Swelling and/or bruising of the surgical extremity is common and may persist for several months after surgery. In addition to frequent icing and elevation, gentle compressive support with an ACE wrap or tubigrip may help with swelling. Apply compression regularly, removing at least twice daily to perform skin checks. Contact your Surgeon Team if your swelling increases and is NOT associated with an increase in your activity level, or if your swelling increases and is associated with redness and pain.     Comfort and Pain Management - Cold therapy   Order Comments: Ice can be used to control swelling and discomfort after surgery. Place a thin towel over your operative site and apply the ice pack overtop. Leave ice pack in place for 20 minutes, then remove for 20 minutes. Repeat this 20 minutes on/20 minutes off routine as often as tolerated.     Medication Instructions - Acetaminophen (TYLENOL) Instructions   Order Comments: You were discharged with acetaminophen (TYLENOL) for pain management after surgery. Acetaminophen most effectively manages pain symptoms when it is taken on a schedule without missing doses (every four, six, or eight hours). Your Provider will prescribe a safe daily dose between 3000 - 4000 mg.  Do NOT exceed this daily dose. Most patients use acetaminophen for pain control for the first four weeks after surgery.  You can wean from this medication as your pain decreases.     Medication Instructions - NSAID Instructions   Order Comments: Do not use NSAIDs x 12 weeks. Some common anti-inflammatories include: ibuprofen (ADVIL, MOTRIN), naproxen (ALEVE, NAPROSYN), celecoxib (CELEBREX), meloxicam (MOBIC), ketorolac (TORADOL).     Medication Instructions - Opioids - Tapering Instructions   Order Comments: In the first three days following surgery, your symptoms may warrant use of the narcotic pain medication every four to six  hours as prescribed. This is normal. As your pain symptoms improve, focus your efforts on decreasing (tapering) use of narcotic medications. The most successful tapering strategy is to first, decrease the number of tablets you take every 4-6 hours to the minimum prescribed. Then, increase the amount of time between doses.  For example:  First, taper to   or 1 tablet every 4-6 hours.  Then, taper to   or 1 tablet every 6-8 hours.  Then, taper to   or 1 tablet every 8-10 hours.  Then, taper to   or 1 tablet every 10-12 hours.  Then, taper to   or 1 tablet at bedtime.  The bedtime dose can help with comfort during sleep and is typically the last dose to be discontinued after surgery.     Medication Instructions - Muscle relaxant Medication Instructions   Order Comments: You were discharged with a muscle relaxer medication that can be used in conjunction with acetaminophen (TYLENOL) and the opioid medication to manage pain symptoms. Take the muscle relaxant medication exactly as directed.     Follow Up Care   Order Comments: Follow-up with your Surgeon Team in 6 weeks for wound check.     Medication instructions - No pharmacologic VTE prophylaxis prescribed   Order Comments: Your Surgeon did not prescribe medication for anticoagulation.     Medication Instructions - Opioid Instructions (1 - 2 tablets Q 4-6 hours, MAX 6 tablets)   Order Comments: You were discharged with an opioid medication (hydromorphone, oxycodone, hydrocodone, or tramadol). This medication should only be taken for breakthrough pain that is not controlled with acetaminophen (TYLENOL). If you rate your pain less than 3 you do not need this medication.  Pain rating 0-3:  You do not need this medication.  Pain rating 4-6:  Take 1 tablet every 4-6 hours as needed  Pain rating 7-10:  Take 2 tablets every 4-6 hours as needed.  Do not exceed 6 tablets per day     Follow Up ([facility])   Order Comments: Follow up with primary care provider, Neena Tam,  within 7 days for hospital follow- up and regarding new diagnosis.  The following labs/tests are recommended: sodium/ electrolyte re-draw.      Appointments on [place] and/or [facility] (with [facility] or [facility] provider or service). Call [phone] if you haven't heard regarding these appointments within 7 days of discharge.     Discharge Instruction - Regular Diet Adult   Order Comments: Return to your pre-surgery diet unless instructed otherwise     Order Specific Question Answer Comments   Is discharge order? Yes        David Barrett MD  Orthopaedic Surgery

## 2024-02-20 ENCOUNTER — VIRTUAL VISIT (OUTPATIENT)
Dept: FAMILY MEDICINE | Facility: CLINIC | Age: 54
End: 2024-02-20
Payer: COMMERCIAL

## 2024-02-20 DIAGNOSIS — E66.811 CLASS 1 OBESITY WITH SERIOUS COMORBIDITY AND BODY MASS INDEX (BMI) OF 32.0 TO 32.9 IN ADULT, UNSPECIFIED OBESITY TYPE: ICD-10-CM

## 2024-02-20 DIAGNOSIS — Z12.31 VISIT FOR SCREENING MAMMOGRAM: Primary | ICD-10-CM

## 2024-02-20 PROCEDURE — 99213 OFFICE O/P EST LOW 20 MIN: CPT | Mod: 95 | Performed by: FAMILY MEDICINE

## 2024-02-20 NOTE — ASSESSMENT & PLAN NOTE
Stable  Wearing neck brace post spine surgery.   Weight today is unknown. Last week at hospital she was 188lbs.   Currently not taking any weight loss meds. Saxenda and wegovy not available at your pharmacy, zepbound not affordable.   Exercise - not really due to neck brace. But she thinks she should start walking. Plans to go to the mall next week.

## 2024-02-20 NOTE — PROGRESS NOTES
"Zayra is a 54 year old who is being evaluated via a billable video visit.      How would you like to obtain your AVS? MyChart  If the video visit is dropped, the invitation should be resent by: Text to cell phone: [phone]  Will anyone else be joining your video visit? No      Assessment & Plan   Problem List Items Addressed This Visit          Digestive    Class 1 obesity with serious comorbidity and body mass index (BMI) of 32.0 to 32.9 in adult     Stable  Wearing neck brace post spine surgery.   Weight today is unknown. Last week at hospital she was 188lbs.   Currently not taking any weight loss meds. Saxenda and wegovy not available at your pharmacy, zepbound not affordable.   Exercise - not really due to neck brace. But she thinks she should start walking. Plans to go to the mall next week.           Other Visit Diagnoses       Visit for screening mammogram    -  Primary              BMI  Estimated body mass index is 32.23 kg/m  as calculated from the following:    Height as of 2/12/24: 1.626 m (5' 4.02\").    Weight as of 2/12/24: 85.2 kg (187 lb 13.3 oz).   Weight management plan: Discussed healthy diet and exercise guidelines        Subjective   Zayra is a 54 year old, presenting for the following health issues:  Weight Loss (/)  Wearing neck brace post spine surgery.   Weight today is unknown. Last week at hospital she was 188lbs.   Currently not taking any weight loss meds. Saxenda and wegovy not available at your pharmacy, zepbound not affordable.   Exercise - not really due to neck brace. But she thinks she should start walking. Plans to go to the mall next week.       2/20/2024     9:49 AM   Additional Questions   Roomed by as   Accompanied by self         2/20/2024     9:49 AM   Patient Reported Additional Medications   Patient reports taking the following new medications no     HPI   Here for weight loss follow up.       Objective    Vitals - Patient Reported  Weight (Patient Reported): 86.2 " "kg (190 lb)  Height (Patient Reported): 162.6 cm (5' 4\")  BMI (Based on Pt Reported Ht/Wt): 32.61        Physical Exam   GENERAL: alert and no distress, wearing neck brace  EYES: Eyes grossly normal to inspection.  No discharge or erythema, or obvious scleral/conjunctival abnormalities.  RESP: No audible wheeze, cough, or visible cyanosis.    SKIN: Visible skin clear. No significant rash, abnormal pigmentation or lesions.  NEURO: Cranial nerves grossly intact.  Mentation and speech appropriate for age.  PSYCH: Appropriate affect, tone, and pace of words        Video-Visit Details    Type of service:  Video Visit   Video Start Time: 9:53  Video End Time:10:03 AM    Originating Location (pt. Location): Home    Distant Location (provider location):  On-site  Platform used for Video Visit: Rosey  Signed Electronically by: Dorothy Hong MD    "

## 2024-02-26 ENCOUNTER — LAB (OUTPATIENT)
Dept: LAB | Facility: CLINIC | Age: 54
End: 2024-02-26
Payer: COMMERCIAL

## 2024-02-26 DIAGNOSIS — Z98.1 S/P SPINAL FUSION: ICD-10-CM

## 2024-02-26 DIAGNOSIS — R73.09 ELEVATED HEMOGLOBIN A1C: ICD-10-CM

## 2024-02-26 DIAGNOSIS — E87.1 HYPONATREMIA: ICD-10-CM

## 2024-02-26 LAB
ANION GAP SERPL CALCULATED.3IONS-SCNC: 9 MMOL/L (ref 7–15)
BUN SERPL-MCNC: 16.3 MG/DL (ref 6–20)
CALCIUM SERPL-MCNC: 9.1 MG/DL (ref 8.6–10)
CHLORIDE SERPL-SCNC: 102 MMOL/L (ref 98–107)
CREAT SERPL-MCNC: 0.77 MG/DL (ref 0.51–0.95)
CREAT UR-MCNC: 17.3 MG/DL
DEPRECATED HCO3 PLAS-SCNC: 29 MMOL/L (ref 22–29)
EGFRCR SERPLBLD CKD-EPI 2021: >90 ML/MIN/1.73M2
GLUCOSE SERPL-MCNC: 97 MG/DL (ref 70–99)
MICROALBUMIN UR-MCNC: <12 MG/L
MICROALBUMIN/CREAT UR: NORMAL MG/G{CREAT}
POTASSIUM SERPL-SCNC: 4.1 MMOL/L (ref 3.4–5.3)
SODIUM SERPL-SCNC: 140 MMOL/L (ref 135–145)

## 2024-02-26 PROCEDURE — 80048 BASIC METABOLIC PNL TOTAL CA: CPT

## 2024-02-26 PROCEDURE — 36415 COLL VENOUS BLD VENIPUNCTURE: CPT

## 2024-02-26 PROCEDURE — 82043 UR ALBUMIN QUANTITATIVE: CPT

## 2024-02-26 PROCEDURE — 82570 ASSAY OF URINE CREATININE: CPT

## 2024-02-26 RX ORDER — SENNA AND DOCUSATE SODIUM 50; 8.6 MG/1; MG/1
1 TABLET, FILM COATED ORAL AT BEDTIME
Qty: 30 TABLET | Refills: 2 | Status: SHIPPED | OUTPATIENT
Start: 2024-02-26

## 2024-02-26 RX ORDER — ACETAMINOPHEN 500 MG
500-1000 TABLET ORAL EVERY 6 HOURS PRN
Qty: 100 TABLET | Refills: 1 | Status: SHIPPED | OUTPATIENT
Start: 2024-02-26

## 2024-02-26 RX ORDER — LIDOCAINE 4 G/G
1 PATCH TOPICAL EVERY 24 HOURS
Qty: 15 PATCH | Refills: 3 | Status: SHIPPED | OUTPATIENT
Start: 2024-02-26

## 2024-02-26 RX ORDER — METHOCARBAMOL 750 MG/1
750 TABLET, FILM COATED ORAL 3 TIMES DAILY
Qty: 35 TABLET | Refills: 2 | Status: SHIPPED | OUTPATIENT
Start: 2024-02-26

## 2024-02-26 RX ORDER — OXYCODONE HYDROCHLORIDE 5 MG/1
5-10 TABLET ORAL EVERY 6 HOURS PRN
Qty: 56 TABLET | Refills: 0 | Status: SHIPPED | OUTPATIENT
Start: 2024-02-26 | End: 2024-03-04

## 2024-02-26 NOTE — TELEPHONE ENCOUNTER
[facility] Call Center    Phone Message    May a detailed message be left on voicemail: yes     Reason for Call: Patient had Surgery on Feb 12. Patient asking for Refill on Oxycodone 5mg . Please Contact Patient     Action Taken: Message routed to:  [facility] ([facility]): laura    Travel Screening: Not Applicable

## 2024-02-26 NOTE — TELEPHONE ENCOUNTER
PDMP Review         Value Time User    State PDMP site checked  Yes 2/26/2024  3:05 PM Kim Mcarthur PA-C             PDMP reviewed today:  #40 oxycodone 10mg filled on 2/16/24    Today, sent refill as requested. See note from Rosy Hunter RN. Patient weaning appropriately. Weaned to 5mg tablets; maximum 8 tablets per day.    Kim Mcarthur PA-C

## 2024-02-26 NOTE — TELEPHONE ENCOUNTER
See phone message from pt for refill of Oxycodone.  DOS 2-12-24.    I called pt back.  Pt has 4 left of Oxycodone 10mg tabs was ordered 10-15mg Q3H prn & is asking to decrease down to the 5mg tabs to make it easier to wean down.    Rates pain 7-8 in AM & takes 1-10mg tab Q6H.  Taking ES Tylenol 2 Q6H, Lido . Patches, Robaxin, & Miralax as ordered.  Has plenty of Miralax. Having BMs & taking Senna that pt had & stated that is working well & needs refill. Taking Lyrica 150mg BID from Primary provider for restless legs. Is not taking previous Flexeril.    I called [facility] pharmacy due to National phone outage problem.  Their electronic prescribing system is working but they do not have 5mg Oxycodone tabs left & only have #20 Oxycodone 10mg tabs left.     I called [facility] pharmacy whose system is working & does have 5mg Oxycodone tabs in stock.    Per pt. Refilled Tylenol, Lido patches , Robaxin & Senna to [facility] & ordered New script for Oxycodone 5mg to [facility].    Call back prn.  Pt agreed.    S.O./Rosy Hunter RN.

## 2024-02-27 NOTE — PROGRESS NOTES
[facility]  Rheumatology Clinic  Panchito Saenz MD  2024     Name: Zayra Ramirez  MRN: [id number]  Age: 54 year old  : 1970  Referring provider: Aime Mason    Assessment and Plan:  # Seronegative inflammatory arthritis:  Daily joint pain and widespread morning stiffness are still present, but less durable..  Exam shows no inflammatory changes in knuckles fingers toes or mid feet; there is impingement of the left shoulder.  Shoulder exam is limited by presence of cervical collar.    Data: On 2024, basic metabolic panel normal ;hemoglobin was 11.6.  Cervical spine film shows recent C2-C5 posterior fusion.    Discussion: Persistent and recurrent morning stiffness and widespread multifocal joint pain are stable.  It is difficult to separate peripheral joint symptoms affected by inflammatory arthritis from symptoms associated with cervical myelopathy.  However, hydroxychloroquine has been well-tolerated, used regularly since 2023.  I think that the drug is providing benefit, and should be continued.  We discussed the need for annual ophthalmology evaluations to monitor for rare retinal Plaquenil toxicity.  Patient will see ophthalmology for routine follow-up in about 1 month and communicate use of hydroxychloroquine at that time.  Combination therapy with anti-TNF could be contemplated for recurrence of inflammatory polyarthritis.    #Rotator cuff tendinitis, did not discuss today; left greater than right, potentially related to calcium pyrophosphate deposition disease; Crystal examination on right shoulder synovial fluid revealed CPPD crystals in 2022.: I recommended resumption of home exercise program for maintaining and improving shoulder range of motion.  Stated that we could perform subacromial injection if physical therapeutic means do not result in improvement in 4 to 5 weeks.    # Cervical radiculopathy, status post fusion:  Cervical spine film shows recent C2-C5  posterior fusion.  # Degenerative arthritis, neck and lumbar spine: Chronic low back pain is markedly improved after lumbar fusion surgery in November 2022 by Dr. Krishnan.  Discussions are ongoing regarding demonstrated degenerative disc disease in the cervical spine and possible surgical solutions for intermittent radicular pain.    # Tobacco abuse:   Patient quit smoking in early 2022    # LFT increase 12-22: Cause of mild transaminase elevation in early December 2022 is not clear.  Methotrexate had been off for approximately 4 weeks at that blood draw.  I recommend rechecking LFTs today.    Follow-up:     Plan:  1. Start hydroxychloroquine 200 mg twice a day.  Expect maximal benefit in 4 months after starting. While using hydroxychloroquine, undergo annual examination of the retina for rare retinal toxicity.  First ophthalmology visit may occur in late 2024.  2.  Resume daily Physical therapy for left more so than right shoulder range of motion  3.  Recheck liver function, kidney function, and inflammation today.  4.  Use acetaminophen up to 1000 mg 3 times a day, and celecoxib 200 mg once daily for pain.  5.  Start short-term course of prednisone to get inflammatory symptoms under control: 15 mg daily for 1 week, then 10 mg daily for 1 week.    RTC    Panchito Saenz MD  Staff Rheumatologist, [facility]    The longitudinal plan of care for the diagnosis(es)/condition(s) as documented were addressed during this visit. Due to the added complexity in care, I will continue to support Zayra in the subsequent management and with ongoing continuity of care.    On the day of the encounter, a total of 31 minutes was spent in chart review, and in counseling and coordination of care, regarding the patient's complex medical problem of seronegative inflammatory arthritis.      HPI:   Zayra Ramirez follows up for seronegative inflammatory arthritis and L shoulder pain.  She was last seen  when seronegative  "inflammatory arthritis was active. Recommendation was to stay off methotrexate, and make a trial of hydroxychloroquine, and to use Celebrex for residual joint pain.    Interval history February 29, 2024    A week ago, she had marked pain in both ankles, so bad that she could not stand. After an hour, pain moderated.  She notes 3-4 days per week of \"flares\" of pain in her shoulders, occasaonlly knees.    She has been off celebrex and steroids, she reports that celebrex was very helpful for her. Last dose of prednisone was over 2 months ago. She will see Orthopedics and discuss possibility of restarting celebrex.    Using tylenol q 6 hours, also oxycontin per Dr. Wilhelm.    She has been taking hydroxychloroquine bid, no AE.      Interval history October 12, 2023    Patient relates gradual return of achy pain in left greater than right shoulder, both knees, both ankles, as well as ankles and wrists to a lesser extent..  There is no visible swelling in hands or feet, but in the mornings, joints are stiff for at least 1 hour.  Pain is relieved modestly by celecoxib, acetaminophen does not provide much relief.    She has ongoing right shoulder and shooting pain down into the hand, she attributes to nerve pinching in the right side of the neck.  She will address this with Dr. Ballard.    She stopped methotrexate approximately 2 years ago prior to back surgery (which was very successful; she is doing very well with regard to her lower back.) and has not restarted.  She did not start hydroxychloroquine after the last visit, for unclear reasons, she did not receive communication about the Plaquenil recommendation.  Interval history February 28, 2023    Patient was seen by Dr. Wilhelm in orthopedics on February 16, 2023 in follow-up of spinal fusion surgery performed in November 2022.  Impression was of satisfactory postoperative recovery, off opioids.  Doing well with regards to her lower back.  Continued right sided " "radicular arm pain was noted.  Concern was for left-sided cervical neuritic symptoms, potentially related to compressive neuropathy.  Recommendation was to continue with nonoperative management and have consultation with pain provider.    She stopped methotrexate before back surgery and started hydroxychloroquine 200 mg tiwce daily. She has continued with hydroxychloroquine.   Today she is doing good with respect to small joint pain.  Her shoulders feel \"tight\" but less painful than last year, comfortable; she does use aceta/ibu each day.  She stil has 1-2 hours of early morning stiffness in small joints, but she is not disabled by the stiffness. Helped by Ptx.    Interval history July 29, 2022    Patient underwent left subacromial injection in rheumatology clinic on Yessenia 3, 2022. She noted signifidcant improvement, with ability to use the L shoulder.     Patient was hospitalized at [facility] from July 13 through July 15, 2022 for pseudogout flareup, and rheumatoid arthritis.  History was of sudden worsening of right shoulder pain on July 12, associated with elevated inflammatory markers and leukocytosis.  Shoulder aspiration was performed in the emergency room and fluid analysis revealed CPPD crystals; she underwent shoulder injection with dramatic improvement, and she was discharged on celebrex.    Since discharge, the R shoulder has been better, able to tolerate gardening and other ADL.   Using acetaminophen 3 500 mg twice daily.    She is doing Ptx both for her shoulders and for her back. She is doing exercises at home daily for the R shoulder.     Interval history June 2, 2022    She still has waxing/waning pain in both shoulders, knees, hips. Sieges of pain start in the mornings, \"I can't even get out of bed due to severe shoulder/elbow pain\". Oftentimes it takes an hour to get out of bed, and a total of 2 hours of early morning stiffness is common.  Former pain affecting fingers wrists and toes has " "lessened somewhat.    Chronic back/neck pain is still a problem. She has seen Dr. Wilhelm, who has discussed low back surgery pending a weight change.     She is still on methotrexate 10 tabs weekly (since 2-2022). She does not think it helps.  She has used several course of prendionse and 2 courses of medrol, last dose 2 weeks ago.   She is \"at least 50%\" better during steroid tapers; symptoms come back within 10 days of stopping. Not using ibuprofen.     Review of Systems:   Pertinent items are noted in HPI or as below, remainder of complete ROS is negative.      No recent problems with hearing or vision. No swallowing problems.   No breathing difficulty, shortness of breath, coughing, or wheezing.  No chest pain or palpitations.  No heart burn, indigestion, abdominal pain, nausea, vomiting, diarrhea.  No urination problems, no bloody, cloudy urine, no dysuria.  No numbing, tingling, weakness.  No headaches or confusion.  No rashes. No easy bleeding or bruising.     Active Medications:   Current Outpatient Medications   Medication Sig    acetaminophen (TYLENOL) 500 MG tablet Take 1-2 tablets (500-1,000 mg) by mouth every 6 hours as needed for mild pain    albuterol (PROAIR HFA/PROVENTIL HFA/VENTOLIN HFA) 108 (90 Base) MCG/ACT inhaler Inhale 2 puffs into the lungs every 6 hours as needed for shortness of breath / dyspnea or wheezing Ventolin please    albuterol (PROVENTIL) (2.5 MG/3ML) 0.083% neb solution INHALE 3 ML VIA A NEBULIZER 4 TIMES DAILY IF NEEDED.    ARIPiprazole (ABILIFY) 5 MG tablet Take 5 mg by mouth every morning    azelastine (ASTELIN) 0.1 % nasal spray Spray 2 sprays in nostril 2 times daily    busPIRone HCl (BUSPAR) 30 MG tablet Take 30 mg by mouth 2 times daily     cloNIDine (CATAPRES) 0.1 MG tablet Take 1 tablet by mouth as needed    cyclobenzaprine (FLEXERIL) 10 MG tablet TAKE 1 TABLET BY MOUTH EVERYDAY AT BEDTIME    DULoxetine (CYMBALTA) 60 MG capsule Take 120 mg by mouth At Bedtime     " EPINEPHrine (ANY BX GENERIC EQUIV) 0.3 MG/0.3ML injection 2-pack Inject 0.3 mLs (0.3 mg) into the muscle as needed for anaphylaxis May repeat one time in 5-15 minutes if response to initial dose is inadequate.    hydroxychloroquine (PLAQUENIL) 200 MG tablet Take 1 tablet (200 mg) by mouth 2 times daily    insulin pen needle (32G X 4 MM) 32G X 4 MM miscellaneous Use 1 NEEDLE WEEKLY    Lidocaine (LIDOCARE) 4 % Patch Place 1 patch onto the skin every 24 hours To prevent lidocaine toxicity, patient should be patch free for 12 hrs daily.Do not place directly over the incision.    liraglutide - Weight Management (SAXENDA) 18 MG/3ML pen INJECT 3 MG SUBCUTANEOUS DAILY FOR 90 DAYS    methocarbamol (ROBAXIN) 750 MG tablet Take 1 tablet (750 mg) by mouth 3 times daily    montelukast (SINGULAIR) 10 MG tablet Take 1 tablet by mouth At Bedtime    naloxone (NARCAN) 4 MG/0.1ML nasal spray Spray 1 spray (4 mg) into one nostril alternating nostrils as needed for opioid reversal every 2-3 minutes until assistance arrives    omeprazole (PRILOSEC) 40 MG DR capsule TAKE 1 CAPSULE BY MOUTH EVERY DAY (Patient taking differently: Take 40 mg by mouth every evening)    oxyCODONE (ROXICODONE) 5 MG tablet Take 1-2 tablets (5-10 mg) by mouth every 6 hours as needed for severe pain (maximum 8 tablets per day. wean down as pain improves)    oxyCODONE IR (ROXICODONE) 10 MG tablet Take 1-1.5 tablets (10-15 mg) by mouth every 3 hours as needed for moderate to severe pain    OXYGEN-HELIUM IN 3L @ night    Oxygen only not helium    polyethylene glycol (MIRALAX) 17 GM/Dose powder Take 17 g by mouth daily    pregabalin (LYRICA) 150 MG capsule Take 1 capsule by mouth in the morning and around 1 PM (Patient taking differently: Take 150 mg by mouth 3 times daily Take 1 capsule by mouth in the morning and around 1 PM)    Respiratory Therapy Supplies ([facility] CPAP FILTER) MISC     rOPINIRole (REQUIP) 0.5 MG tablet Take 1 tablet (0.5 mg) by mouth At  Bedtime    SENNA-docusate sodium (SENNA S) 8.6-50 MG tablet Take 1 tablet by mouth at bedtime    STIOLTO RESPIMAT 2.5-2.5 MCG/ACT AERS Inhale 2 puffs into the lungs every morning     No current facility-administered medications for this visit.     Facility-Administered Medications Ordered in Other Visits   Medication    Lidocaine 1 % injection 0.5-5 mL    sodium chloride (PF) 0.9% PF flush 5-50 mL           Allergies:   Bee Venom       Bees      Doxycycline       Erythromycin     Hydrocodone-Acetaminophen                 Past Medical History:  Remarkable for moderate, persistent asthma, hypertension, bipolar II disorder, interstitial lung disease, cervical stenosis with myelopathy 2017.       Chronic midline low back pain  Facial cellulitis  Morbid (severe) obesity due to excess calories  Dental abscess  Hypoxia  Subcutaneous emphysema  Borderline personality disorder  Stenosis of cervical spine with myelopathy  Esophageal stricture  Gastroesophageal reflux disease   Hypertension   Interstitial lung disease  Moderate persistent asthma without complication  Onychomycosis  Restless leg syndrome  Seasonal allergies  Smoker  Stress  Respiratory bronchiolitis interstitial lung disease     Past Surgical History:  Remarkable for surgical fusion  Hysterectomy 1997  rectocele and Cystocele surgery  Cholecystectomy     Family History:    The patient's family history includes Asthma in her mother; Back Pain in her father; Hypertension in her father; Other Cancer in her father.    Social History:  The patient reports that she has been smoking cigarettes, around 0.5 packs per day. She started smoking about 23 years ago. She has a 30.00 pack-year smoking history. She has never used smokeless tobacco. She reports that she does not drink alcohol or use drugs.   PCP: Adam Del Toro  Marital Status: Single     Physical Exam:   /73 (BP Location: Left arm, Patient Position: Sitting, Cuff Size: Adult Large)   Pulse 78    Resp 16   LMP  (LMP Unknown)   SpO2 97%    Wt Readings from Last 4 Encounters:   02/12/24 85.2 kg (187 lb 13.3 oz)   01/16/24 86.2 kg (190 lb)   10/23/23 86.2 kg (190 lb)   10/12/23 86.6 kg (191 lb)     Constitutional: Well-developed, appearing stated age; cooperative.  Eyes: Normal EOM, PERRLA, vision, conjunctiva, sclera.  ENT: Normal external ears, nose, hearing, lips, teeth, gums, throat. No mucous membrane lesions.  Neck: No mass or thyroid enlargement.  Resp: Breathing unlabored  MS: Painless forward elevation 120 degrees, right shoulder lacks more motion than the left.  No tenderness with shoulder palpation.  Passive external and internal rotation are normal.  Impingement is present in the left shoulder.  Fist formation intact, normal wrist ROM.  No visible swelling at MCPs or PIPs.  MTPs nontender.  Skin: No nail pitting, alopecia, rash, nodules or lesions.  Neuro: Normal cranial nerves  Psych: Normal judgement, orientation, memory, affect.    Laboratory:       Latest Ref Rng & Units 2/15/2024     8:30 AM 2/16/2024    12:38 PM 2/26/2024     3:25 PM   RHEUM RESULTS   Creatinine 0.51 - 0.95 mg/dL 0.80  0.67  0.77    GFR Estimate >60 mL/min/1.73m2 88  >90  >90

## 2024-02-28 DIAGNOSIS — Z98.1 S/P SPINAL FUSION: Primary | ICD-10-CM

## 2024-02-29 ENCOUNTER — OFFICE VISIT (OUTPATIENT)
Dept: RHEUMATOLOGY | Facility: CLINIC | Age: 54
End: 2024-02-29
Payer: COMMERCIAL

## 2024-02-29 VITALS
HEART RATE: 78 BPM | SYSTOLIC BLOOD PRESSURE: 107 MMHG | OXYGEN SATURATION: 97 % | RESPIRATION RATE: 16 BRPM | DIASTOLIC BLOOD PRESSURE: 73 MMHG

## 2024-02-29 DIAGNOSIS — Z87.39 HISTORY OF SERONEGATIVE INFLAMMATORY ARTHRITIS: ICD-10-CM

## 2024-02-29 PROCEDURE — 99214 OFFICE O/P EST MOD 30 MIN: CPT | Mod: 24 | Performed by: INTERNAL MEDICINE

## 2024-02-29 RX ORDER — HYDROXYCHLOROQUINE SULFATE 200 MG/1
200 TABLET, FILM COATED ORAL 2 TIMES DAILY
Qty: 180 TABLET | Refills: 3 | Status: SHIPPED | OUTPATIENT
Start: 2024-02-29

## 2024-02-29 NOTE — PATIENT INSTRUCTIONS
Diagnosis:  1.  Inflammatory arthritis: Small joint symptoms in the hands and feet remain improved, but morning stiffness remains.  I recommend continuing hydroxychloroquine for inflammatory arthritis.  2.  Rotator cuff tendinitis, left more so than right shoulder: Range of motion is improved, but still limited today.  I recommend ongoing physical therapy  4.  Pseudogout, right shoulder: No recurrence of inflammatory symptoms.    Plan:  1. Continue hydroxychloroquine 200 mg twice a day.  Expect maximal benefit in 4 months after starting. While using hydroxychloroquine, undergo annual examination of the retina for rare retinal toxicity.  First ophthalmology visit may occur in late 2024.  2.  Check creatinine once every 6 months while using hydroxychloroquine.  4.  Use acetaminophen up to 1000 mg 4 times a day, and celecoxib 200 mg once daily for pain after spine surgery gives okay to restart.

## 2024-02-29 NOTE — NURSING NOTE
Chief Complaint   Patient presents with    RECHECK     History of seronegative inflammatory arthritis       Vitals:    02/29/24 1006   BP: 107/73   BP Location: Left arm   Patient Position: Sitting   Cuff Size: Adult Large   Pulse: 78   Resp: 16   SpO2: (!) 89%       There is no height or weight on file to calculate BMI.    Leny Leal, ICVF

## 2024-03-06 ENCOUNTER — VIRTUAL VISIT (OUTPATIENT)
Dept: SURGERY | Facility: CLINIC | Age: 54
End: 2024-03-06
Payer: COMMERCIAL

## 2024-03-06 DIAGNOSIS — E66.9 OBESITY (BMI 30-39.9): Primary | ICD-10-CM

## 2024-03-06 PROCEDURE — 97803 MED NUTRITION INDIV SUBSEQ: CPT | Mod: 95 | Performed by: DIETITIAN, REGISTERED

## 2024-03-06 NOTE — PROGRESS NOTES
Zayra Ramirez is a 54 year old who is being evaluated via a billable video visit.      Medical  Weight Loss Follow-Up Diet Evaluation  Assessment:  Zayra is presenting today for a follow up weight management nutrition consultation.  This patient has had an initial appointment and was referred by Dr. Hong  for MNT as treatment for Obesity.     Weight loss medication:  Saxenda-unable to get, Pharmacy out of it, may switch to Zepbound .   Pt's weight is 191 lbs  Initial weight: 219 lbs   Weight change: up 3 lbs (over the last 2 months)        9/20/2023    10:06 AM   Changes and Difficulties   I have made the following changes to my diet since my last visit: lowered carbs   With regards to my diet, I am still struggling with: enough protein   I have made the following changes to my activity/exercise since my last visit: increased walking   With regards to my activity/exercise, I am still struggling with: walking over a mile atatime     BMI: There is no height or weight on file to calculate BMI.  Ideal body weight: 54.7 kg (120 lb 9.5 oz)  Adjusted ideal body weight: 67.3 kg (148 lb 5.7 oz)    Estimated RMR ([place]-St Jeor equation):   1466 kcals x 1.3 (light active) = 1906 kcals (for weight maintenance)    Recommended Protein Intake: 60-80 grams of protein/day  Patient Active Problem List:  Patient Active Problem List   Diagnosis    Chronic bilateral low back pain without sciatica    Facial cellulitis    Class 1 obesity with serious comorbidity and body mass index (BMI) of 32.0 to 32.9 in adult    Dental abscess    Hypoxia    Subcutaneous emphysema (H24)    Borderline personality disorder (H)    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)    Esophageal stricture    Obstruction of esophagus    HTN (hypertension)    Interstitial lung disease (H)    Moderate persistent asthma without complication    Onychomycosis    Restless leg syndrome    Seasonal allergies    Stress    Respiratory bronchiolitis interstitial  lung disease (H)    Spinal epidural abscess    Lumbar spondylosis    Inflammatory arthritis    Arthralgia of temporomandibular joint    Articular disc disorder of temporomandibular joint    Derangement of temporomandibular joint    Calculus of gallbladder without cholecystitis without obstruction    Displacement of articular disc of temporomandibular joint with reduction    Myofascial pain    Oral lesion    Symptomatic cholelithiasis    Sacro-iliac pain    Degenerative lumbar spinal stenosis    Glucose intolerance    Chronic neck pain    Lumbar radiculopathy, chronic    Cervical radiculopathy    Acute pain of right shoulder    Other osteoporosis without current pathological fracture    Diaphragmatic hernia    Bipolar 2 disorder (H)    Choking sensation    Gastro-esophageal reflux disease with esophagitis    Limited opening of mandible    History of pelvic surgery    Voiding dysfunction    Pelvic floor dysfunction    Urinary hesitancy    Urinary frequency    S/P spinal fusion    Cannabis abuse, daily use       Progress on goals from last visit: Had surgery on February 12 and will be recovering for the next month. Low appetite, weight is maintaining.   Switch out nuts for cottage cheese and lunch - not met   Work on increased water consumption, aiming for 64-96 oz/day - met    Dietary Recall:  Breakfast: (no appetite in the AM but always thirsty) - 2 eggs or Premiere Protein Shake with Coffee  Lunch: fruit or nuts  Dinner: protein with vegetables, rice or potato  Typical snacks: none typically, maybe a hardboiled or celery/carrots  Beverages: water- 2 L per day  Coffee   Sometimes 2 protein shakes  Exercise: walking the dog-1/2 mile/day; PT exercises 5 times/week    Nutrition Diagnosis:    Obesity (NC 3.3) related to overeating and poor lifestyle habits as evidenced by patient's subjective statements and BMI of 32.8 kg/m2.  Intervention:  Food and/or nutrient delivery: 3 balanced meals, adequate protein   Nutrition  education: sodium sources, general recommendations for sodium intake (2.3 g per day)   Nutrition counseling: provided support and encouragement    Monitoring/Evaluation:    Goals:  Maintain 3 protein based meals daily  Work on increased water consumption, aiming for 64-96 oz/day.   Watch sodium intake for now to be around 2,300 mg daily, until you can check in with the doctor    Patient to follow up in 2.5 month(s) with RD    Video-Visit Details    Type of service:  Video Visit    Video Start Time (time video started): 9:03 am     Video End Time (time video stopped): 9:23 am     Originating Location (pt. Location): Home      Distant Location (provider location):  Off-site    Mode of Communication:  Video Conference via [facility]    Physician has received verbal consent for a Video Visit from the patient? Yes      Saumya Dobbins, RD

## 2024-03-06 NOTE — LETTER
3/6/2024         RE: Zayra Ramirez  [address] [place] [address]        Dear Colleague,    Thank you for referring your patient, Zayra Ramirez, to the [facility] [place]. Please see a copy of my visit note below.    Zayra Ramirez is a 54 year old who is being evaluated via a billable video visit.      Medical  Weight Loss Follow-Up Diet Evaluation  Assessment:  Zayra is presenting today for a follow up weight management nutrition consultation.  This patient has had an initial appointment and was referred by Dr. Hong  for MNT as treatment for Obesity.     Weight loss medication:  Saxenda-unable to get, Pharmacy out of it, may switch to Zepbound .   Pt's weight is 191 lbs  Initial weight: 219 lbs   Weight change: up 3 lbs (over the last 2 months)        9/20/2023    10:06 AM   Changes and Difficulties   I have made the following changes to my diet since my last visit: lowered carbs   With regards to my diet, I am still struggling with: enough protein   I have made the following changes to my activity/exercise since my last visit: increased walking   With regards to my activity/exercise, I am still struggling with: walking over a mile atatime     BMI: There is no height or weight on file to calculate BMI.  Ideal body weight: 54.7 kg (120 lb 9.5 oz)  Adjusted ideal body weight: 67.3 kg (148 lb 5.7 oz)    Estimated RMR (Huntingdon-St Jeor equation):   1466 kcals x 1.3 (light active) = 1906 kcals (for weight maintenance)    Recommended Protein Intake: 60-80 grams of protein/day  Patient Active Problem List:  Patient Active Problem List   Diagnosis     Chronic bilateral low back pain without sciatica     Facial cellulitis     Class 1 obesity with serious comorbidity and body mass index (BMI) of 32.0 to 32.9 in adult     Dental abscess     Hypoxia     Subcutaneous emphysema (H24)     Borderline personality disorder (H)     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)     Esophageal stricture     Obstruction of esophagus     HTN (hypertension)     Interstitial lung disease (H)     Moderate persistent asthma without complication     Onychomycosis     Restless leg syndrome     Seasonal allergies     Stress     Respiratory bronchiolitis interstitial lung disease (H)     Spinal epidural abscess     Lumbar spondylosis     Inflammatory arthritis     Arthralgia of temporomandibular joint     Articular disc disorder of temporomandibular joint     Derangement of temporomandibular joint     Calculus of gallbladder without cholecystitis without obstruction     Displacement of articular disc of temporomandibular joint with reduction     Myofascial pain     Oral lesion     Symptomatic cholelithiasis     Sacro-iliac pain     Degenerative lumbar spinal stenosis     Glucose intolerance     Chronic neck pain     Lumbar radiculopathy, chronic     Cervical radiculopathy     Acute pain of right shoulder     Other osteoporosis without current pathological fracture     Diaphragmatic hernia     Bipolar 2 disorder (H)     Choking sensation     Gastro-esophageal reflux disease with esophagitis     Limited opening of mandible     History of pelvic surgery     Voiding dysfunction     Pelvic floor dysfunction     Urinary hesitancy     Urinary frequency     S/P spinal fusion     Cannabis abuse, daily use       Progress on goals from last visit: Had surgery on February 12 and will be recovering for the next month. Low appetite, weight is maintaining.   Switch out nuts for cottage cheese and lunch - not met   Work on increased water consumption, aiming for 64-96 oz/day - met    Dietary Recall:  Breakfast: (no appetite in the AM but always thirsty) - 2 eggs or Premiere Protein Shake with Coffee  Lunch: fruit or nuts  Dinner: protein with vegetables, rice or potato  Typical snacks: none typically, maybe a hardboiled or celery/carrots  Beverages: water- 2 L per day  Coffee   Sometimes 2  protein shakes  Exercise: walking the dog-1/2 mile/day; PT exercises 5 times/week    Nutrition Diagnosis:    Obesity (NC 3.3) related to overeating and poor lifestyle habits as evidenced by patient's subjective statements and BMI of 32.8 kg/m2.  Intervention:  Food and/or nutrient delivery: 3 balanced meals, adequate protein   Nutrition education: sodium sources, general recommendations for sodium intake (2.3 g per day)   Nutrition counseling: provided support and encouragement    Monitoring/Evaluation:    Goals:  Maintain 3 protein based meals daily  Work on increased water consumption, aiming for 64-96 oz/day.   Watch sodium intake for now to be around 2,300 mg daily, until you can check in with the doctor    Patient to follow up in 2.5 month(s) with DANIEL    Video-Visit Details    Type of service:  Video Visit    Video Start Time (time video started): 9:03 am     Video End Time (time video stopped): 9:23 am     Originating Location (pt. Location): Home      Distant Location (provider location):  Off-site    Mode of Communication:  Video Conference via [facility]    Physician has received verbal consent for a Video Visit from the patient? Yes      Saumya Dobbins RD          Again, thank you for allowing me to participate in the care of your patient.        Sincerely,        Sarika Palumbo RD

## 2024-03-11 NOTE — PROGRESS NOTES
Zayra Ramirez is a 54 year old female with the following diagnoses:   1. Intermittent exotropia, alternating    2. S/P spinal fusion    3. Diplopia         Patient was sent for consultation by Dr. Tam for horizontal binocular diplopia.    HPI:    Patient with history of known intermittent XT who presented with horizontal binocular diplopia after spinal surgery on 2/12/24 for a spinal fusion due to pinched nerve and deterioration. She was seen by consult resident Dr. Tinoco on 2/15/24. Examination was overall reassuring and symptoms were attributed to decompensation of her left intermittent XT. She was discharged on 2/15/24.    Since that time, patient states things have significantly improved. She did have a bit of double vision after discharge but now she is no longer having frequent double since stopping her oxycodone 1 week ago. Rarely when she gets tired at the end of the day she still has some diplopia. She thinks the double was related to the pain meds and now rarely when she gets tired. No ptosis, pain with EOMs, new numbness, weakness or tingling (this is longstanding in right hand because of neck but has improved since spinal surgery). No known history of strabismus surgery or eye patching.    Independent historians:  Patient    Review of outside testing:    None      Review of outside clinical notes:     -- Visit with consult resident (staffed with Dr. Jc Quiros) on 2/15/24      Past medical history:    Patient Active Problem List   Diagnosis    Chronic bilateral low back pain without sciatica    Facial cellulitis    Class 1 obesity with serious comorbidity and body mass index (BMI) of 32.0 to 32.9 in adult    Dental abscess    Hypoxia    Subcutaneous emphysema (H24)    Borderline personality disorder (H)    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)    Esophageal stricture    Obstruction of esophagus    HTN (hypertension)    Interstitial lung disease (H)    Moderate persistent asthma  without complication    Onychomycosis    Restless leg syndrome    Seasonal allergies    Stress    Respiratory bronchiolitis interstitial lung disease (H)    Spinal epidural abscess    Lumbar spondylosis    Inflammatory arthritis    Arthralgia of temporomandibular joint    Articular disc disorder of temporomandibular joint    Derangement of temporomandibular joint    Calculus of gallbladder without cholecystitis without obstruction    Displacement of articular disc of temporomandibular joint with reduction    Myofascial pain    Oral lesion    Symptomatic cholelithiasis    Sacro-iliac pain    Degenerative lumbar spinal stenosis    Glucose intolerance    Chronic neck pain    Lumbar radiculopathy, chronic    Cervical radiculopathy    Acute pain of right shoulder    Other osteoporosis without current pathological fracture    Diaphragmatic hernia    Bipolar 2 disorder (H)    Choking sensation    Gastro-esophageal reflux disease with esophagitis    Limited opening of mandible    History of pelvic surgery    Voiding dysfunction    Pelvic floor dysfunction    Urinary hesitancy    Urinary frequency    S/P spinal fusion    Cannabis abuse, daily use         Medications:   acetaminophen  albuterol  ARIPiprazole  azelastine  busPIRone HCl  CareTouch [employer] CPAP Filter Misc  cloNIDine  cyclobenzaprine  DULoxetine  EPINEPHrine  hydroxychloroquine  insulin pen needle  Lidocaine  methocarbamol  montelukast  naloxone  omeprazole  oxyCODONE IR  OXYGEN-HELIUM IN  polyethylene glycol  pregabalin  rOPINIRole  Saxenda Sopn  SENNA-docusate sodium  sodium chloride (PF)  Stiolto Respimat Aers      Family history / social history:  Patient's family history includes Anxiety Disorder in her father; Asthma in her mother; Back Pain in her father; Breast Cancer (age of onset: 55) in her maternal aunt; Coronary Artery Disease in her father; Depression in her father; Diabetes in her father; Hypertension in her father; Osteoporosis in her father;  Other Cancer in her father; Restless Leg Syndrome in her father, mother, and sister.     Patient  reports that she quit smoking about 2 years ago. Her smoking use included cigarettes. She started smoking about 28 years ago. She has a 52.5 pack-year smoking history. She quit smokeless tobacco use about 2 years ago. She reports current drug use. Drug: Marijuana. She reports that she does not drink alcohol.       Exam:  Visual acuity 20/20 right eye 20/20 left eye.  Color vision 11/11 right eye and 11/11 left eye.  PERRL without APD.  Intraocular pressure 10 right eye and 9 left eye.  Anterior segment exam with 1+ NS both eyes.  Fundus exam unremarkable.  Strabismus exam with intermittent XT    Tests ordered and interpreted today:  Sensorimotor exam      Discussion of management / interpretation with another provider:   None    Assessment/Plan:   It is my impression that patient had diplopia from a decompensated intermittent exotropia.  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  With her recent hospitalization and fatigue with taking pain medications and having spine surgery, she broke down and developed double vision.  Since her 20s, she has noted that she has a tendency to close 1 eye when she is out in the bright sunlight this is a common symptom for someone who has an intermittent exotropia.  Right now, she is not really experiencing significant double vision.  I would recommend that she follow-up with me as needed if she develops double vision again.  It could be that we are able to place a prism in her glasses to correct the double vision at that time.            Attending Physician Attestation:  Complete documentation of historical and exam elements from today's encounter can be found in the full encounter summary report (not reduplicated in this progress note).  I personally obtained the chief complaint(s) and history of present illness.  I confirmed and edited as necessary the review  of systems, past medical/surgical history, family history, social history, and examination findings as documented by others; and I examined the patient myself.  I personally reviewed the relevant tests, images, and reports as documented above.  I formulated and edited as necessary the assessment and plan and discussed the findings and management plan with the patient and family. I personally reviewed the ophthalmic test(s) associated with this encounter, agree with the interpretation(s) as documented by the resident/fellow, and have edited the corresponding report(s) as necessary.  - Lino Xiong MD  Resident Physician, PGY-3  Department of Ophthalmology

## 2024-03-12 ENCOUNTER — OFFICE VISIT (OUTPATIENT)
Dept: OPHTHALMOLOGY | Facility: CLINIC | Age: 54
End: 2024-03-12
Attending: OPHTHALMOLOGY
Payer: COMMERCIAL

## 2024-03-12 DIAGNOSIS — H53.2 DIPLOPIA: ICD-10-CM

## 2024-03-12 DIAGNOSIS — H50.34 INTERMITTENT EXOTROPIA, ALTERNATING: Primary | ICD-10-CM

## 2024-03-12 DIAGNOSIS — Z98.1 S/P SPINAL FUSION: ICD-10-CM

## 2024-03-12 DIAGNOSIS — H53.10 SUBJECTIVE VISUAL DISTURBANCE: Primary | ICD-10-CM

## 2024-03-12 PROCEDURE — 92060 SENSORIMOTOR EXAMINATION: CPT | Mod: 26 | Performed by: OPHTHALMOLOGY

## 2024-03-12 PROCEDURE — 92060 SENSORIMOTOR EXAMINATION: CPT | Performed by: OPHTHALMOLOGY

## 2024-03-12 PROCEDURE — 92060 SENSORIMOTOR EXAMINATION: CPT

## 2024-03-12 PROCEDURE — 99243 OFF/OP CNSLTJ NEW/EST LOW 30: CPT | Mod: GC | Performed by: OPHTHALMOLOGY

## 2024-03-12 PROCEDURE — G0463 HOSPITAL OUTPT CLINIC VISIT: HCPCS | Performed by: OPHTHALMOLOGY

## 2024-03-12 ASSESSMENT — ASTHMA QUESTIONNAIRES
QUESTION_3 LAST FOUR WEEKS HOW OFTEN DID YOUR ASTHMA SYMPTOMS (WHEEZING, COUGHING, SHORTNESS OF BREATH, CHEST TIGHTNESS OR PAIN) WAKE YOU UP AT NIGHT OR EARLIER THAN USUAL IN THE MORNING: TWO OR THREE NIGHTS A WEEK
ACT_TOTALSCORE: 11
QUESTION_1 LAST FOUR WEEKS HOW MUCH OF THE TIME DID YOUR ASTHMA KEEP YOU FROM GETTING AS MUCH DONE AT WORK, SCHOOL OR AT HOME: A LITTLE OF THE TIME
QUESTION_4 LAST FOUR WEEKS HOW OFTEN HAVE YOU USED YOUR RESCUE INHALER OR NEBULIZER MEDICATION (SUCH AS ALBUTEROL): THREE OR MORE TIMES PER DAY
QUESTION_2 LAST FOUR WEEKS HOW OFTEN HAVE YOU HAD SHORTNESS OF BREATH: MORE THAN ONCE A DAY
ACT_TOTALSCORE: 11
QUESTION_5 LAST FOUR WEEKS HOW WOULD YOU RATE YOUR ASTHMA CONTROL: SOMEWHAT CONTROLLED

## 2024-03-12 ASSESSMENT — TONOMETRY
IOP_METHOD: ICARE
OS_IOP_MMHG: 09
OD_IOP_MMHG: 10

## 2024-03-12 ASSESSMENT — REFRACTION_WEARINGRX
OD_ADD: +2.25
OD_CYLINDER: +1.75
OD_AXIS: 171
OD_SPHERE: -0.50
OS_ADD: +2.25
OS_SPHERE: -0.75
OS_CYLINDER: +1.50
SPECS_TYPE: LINED BIFOCAL
OS_AXIS: 003

## 2024-03-12 ASSESSMENT — EXTERNAL EXAM - LEFT EYE: OS_EXAM: NORMAL

## 2024-03-12 ASSESSMENT — VISUAL ACUITY
OD_CC: 20/20
CORRECTION_TYPE: GLASSES
OS_CC: 20/20
METHOD: SNELLEN - LINEAR

## 2024-03-12 ASSESSMENT — CONF VISUAL FIELD
OD_INFERIOR_NASAL_RESTRICTION: 0
OS_NORMAL: 1
OS_INFERIOR_TEMPORAL_RESTRICTION: 0
OS_SUPERIOR_TEMPORAL_RESTRICTION: 0
OD_INFERIOR_TEMPORAL_RESTRICTION: 0
OS_INFERIOR_NASAL_RESTRICTION: 0
OD_SUPERIOR_NASAL_RESTRICTION: 0
OD_NORMAL: 1
OD_SUPERIOR_TEMPORAL_RESTRICTION: 0
METHOD: COUNTING FINGERS
OS_SUPERIOR_NASAL_RESTRICTION: 0

## 2024-03-12 ASSESSMENT — SLIT LAMP EXAM - LIDS
COMMENTS: NORMAL, NO PTOSIS
COMMENTS: NORMAL, NO PTOSIS

## 2024-03-12 ASSESSMENT — CUP TO DISC RATIO
OD_RATIO: 0.2
OS_RATIO: 0.2

## 2024-03-12 ASSESSMENT — EXTERNAL EXAM - RIGHT EYE: OD_EXAM: NORMAL

## 2024-03-12 NOTE — LETTER
3/12/2024         RE:  :  MRN: Zayra Ramirez  1970  [id number]     Dear Dr. Tam,    Thank you for asking me to see your very pleasant patient, Zayra Ramirez, in neuro-ophthalmic consultation.  I would like to thank you for sending your records and I have summarized them in the history of present illness.  My assessment and plan are below.  For further details, please see my attached clinic note.      Zayra Ramirez is a 54 year old female with the following diagnoses:   1. Intermittent exotropia, alternating    2. S/P spinal fusion    3. Diplopia         Patient was sent for consultation by Dr. Tam for horizontal binocular diplopia.    HPI: Patient with history of known intermittent XT who presented with horizontal binocular diplopia after spinal surgery on 24 for a spinal fusion due to pinched nerve and deterioration. She was seen by consult resident Dr. Tinoco on 2/15/24. Examination was overall reassuring and symptoms were attributed to decompensation of her left intermittent XT. She was discharged on 2/15/24.    Since that time, patient states things have significantly improved. She did have a bit of double vision after discharge but now she is no longer having frequent double since stopping her oxycodone 1 week ago. Rarely when she gets tired at the end of the day she still has some diplopia. She thinks the double was related to the pain meds and now rarely when she gets tired. No ptosis, pain with EOMs, new numbness, weakness or tingling (this is longstanding in right hand because of neck but has improved since spinal surgery). No known history of strabismus surgery or eye patching.    Independent historians: Patient    Review of outside testing: None    Review of outside clinical notes:   -- Visit with consult resident (staffed with Dr. Jc Quiros) on 2/15/24      Past medical history:    Patient Active Problem List   Diagnosis    Chronic bilateral low back pain without  sciatica    Facial cellulitis    Class 1 obesity with serious comorbidity and body mass index (BMI) of 32.0 to 32.9 in adult    Dental abscess    Hypoxia    Subcutaneous emphysema (H24)    Borderline personality disorder (H)    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)    Esophageal stricture    Obstruction of esophagus    HTN (hypertension)    Interstitial lung disease (H)    Moderate persistent asthma without complication    Onychomycosis    Restless leg syndrome    Seasonal allergies    Stress    Respiratory bronchiolitis interstitial lung disease (H)    Spinal epidural abscess    Lumbar spondylosis    Inflammatory arthritis    Arthralgia of temporomandibular joint    Articular disc disorder of temporomandibular joint    Derangement of temporomandibular joint    Calculus of gallbladder without cholecystitis without obstruction    Displacement of articular disc of temporomandibular joint with reduction    Myofascial pain    Oral lesion    Symptomatic cholelithiasis    Sacro-iliac pain    Degenerative lumbar spinal stenosis    Glucose intolerance    Chronic neck pain    Lumbar radiculopathy, chronic    Cervical radiculopathy    Acute pain of right shoulder    Other osteoporosis without current pathological fracture    Diaphragmatic hernia    Bipolar 2 disorder (H)    Choking sensation    Gastro-esophageal reflux disease with esophagitis    Limited opening of mandible    History of pelvic surgery    Voiding dysfunction    Pelvic floor dysfunction    Urinary hesitancy    Urinary frequency    S/P spinal fusion    Cannabis abuse, daily use         Medications:   acetaminophen  albuterol  ARIPiprazole  azelastine  busPIRone HCl  CareTouch [facility] CPAP Filter Misc  cloNIDine  cyclobenzaprine  DULoxetine  EPINEPHrine  hydroxychloroquine  insulin pen needle  Lidocaine  methocarbamol  montelukast  naloxone  omeprazole  oxyCODONE IR  OXYGEN-HELIUM IN  polyethylene glycol  pregabalin  rOPINIRole  Saxenda  Sopn  SENNA-docusate sodium  sodium chloride (PF)  Stiolto Respimat Aers      Family history / social history:  Patient's family history includes Anxiety Disorder in her father; Asthma in her mother; Back Pain in her father; Breast Cancer (age of onset: 55) in her maternal aunt; Coronary Artery Disease in her father; Depression in her father; Diabetes in her father; Hypertension in her father; Osteoporosis in her father; Other Cancer in her father; Restless Leg Syndrome in her father, mother, and sister.     Patient  reports that she quit smoking about 2 years ago. Her smoking use included cigarettes. She started smoking about 28 years ago. She has a 52.5 pack-year smoking history. She quit smokeless tobacco use about 2 years ago. She reports current drug use. Drug: Marijuana. She reports that she does not drink alcohol.     Exam: Visual acuity 20/20 right eye 20/20 left eye.  Color vision 11/11 right eye and 11/11 left eye.  PERRL without APD.  Intraocular pressure 10 right eye and 9 left eye.  Anterior segment exam with 1+ NS both eyes.  Fundus exam unremarkable.  Strabismus exam with intermittent XT    Tests ordered and interpreted today: Sensorimotor exam     Discussion of management / interpretation with another provider:  None    Assessment/Plan: It is my impression that patient had diplopia from a decompensated intermittent exotropia.  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  With her recent hospitalization and fatigue with taking pain medications and having spine surgery, she broke down and developed double vision.  Since her 20s, she has noted that she has a tendency to close 1 eye when she is out in the bright sunlight this is a common symptom for someone who has an intermittent exotropia.  Right now, she is not really experiencing significant double vision.  I would recommend that she follow-up with me as needed if she develops double vision again.  It could be that we are  able to place a prism in her glasses to correct the double vision at that time.    Again, thank you for allowing me to participate in the care of your patient.      Sincerely,    Lino Razo MD  Professor  Ophthalmology Residency   Director of Neuro-Ophthalmology  [employer] [facility] [employer] Endowed Chair  Departments of Ophthalmology, Neurology, and Neurosurgery  [place] [address]  [address], [place]  [address]  T - [phone]  F - [phone]  [email or web address]      CC: NICKO Martinez [facility]  [address]  [facility] [address]  Via In Basket    DX = decompensated exotropia

## 2024-03-12 NOTE — NURSING NOTE
Chief Complaint(s) and History of Present Illness(es)       Diplopia Evaluation    In both eyes.  Disease is chronic.  Characterized as horizontal.  Occurring intermittently.  Associated symptoms include Negative for eye pain, blurred vision and headaches.             Comments    Zayra Ramirez is a 54 year old female sent for consultation for double vision.  Patient was recently discharged from the hospital for a 1 week stay for neck surgery.  Was noticing double vision while admitted but now says the double vision is intermittent.  She says the double vision is horizontal and more significant when fatigue in the evenings.  No eye pain but has dry eyes and uses prescription eyedrops from [facility].  No hx of childhood amblyopia or strabismus.    BRICE Vargas 3/12/2024 7:24 AM

## 2024-03-14 ENCOUNTER — ANCILLARY PROCEDURE (OUTPATIENT)
Dept: GENERAL RADIOLOGY | Facility: CLINIC | Age: 54
End: 2024-03-14
Attending: ORTHOPAEDIC SURGERY
Payer: COMMERCIAL

## 2024-03-14 ENCOUNTER — OFFICE VISIT (OUTPATIENT)
Dept: ORTHOPEDICS | Facility: CLINIC | Age: 54
End: 2024-03-14
Payer: COMMERCIAL

## 2024-03-14 ENCOUNTER — LAB (OUTPATIENT)
Dept: LAB | Facility: CLINIC | Age: 54
End: 2024-03-14
Payer: COMMERCIAL

## 2024-03-14 ENCOUNTER — OFFICE VISIT (OUTPATIENT)
Dept: INTERNAL MEDICINE | Facility: CLINIC | Age: 54
End: 2024-03-14
Payer: COMMERCIAL

## 2024-03-14 VITALS
OXYGEN SATURATION: 93 % | DIASTOLIC BLOOD PRESSURE: 83 MMHG | WEIGHT: 193 LBS | HEIGHT: 64 IN | HEART RATE: 69 BPM | SYSTOLIC BLOOD PRESSURE: 133 MMHG | BODY MASS INDEX: 32.95 KG/M2

## 2024-03-14 DIAGNOSIS — G89.18 POSTOPERATIVE PAIN: ICD-10-CM

## 2024-03-14 DIAGNOSIS — E87.1 LOW SODIUM LEVELS: ICD-10-CM

## 2024-03-14 DIAGNOSIS — Z98.1 S/P SPINAL FUSION: ICD-10-CM

## 2024-03-14 DIAGNOSIS — Z98.1 S/P SPINAL FUSION: Primary | ICD-10-CM

## 2024-03-14 DIAGNOSIS — L08.9 SUPERFICIAL SKIN INFECTION: ICD-10-CM

## 2024-03-14 DIAGNOSIS — M54.2 ACUTE NECK PAIN: ICD-10-CM

## 2024-03-14 DIAGNOSIS — G89.18 POSTOPERATIVE PAIN: Primary | ICD-10-CM

## 2024-03-14 DIAGNOSIS — Z12.31 ENCOUNTER FOR SCREENING MAMMOGRAM FOR BREAST CANCER: Primary | ICD-10-CM

## 2024-03-14 LAB
ANION GAP SERPL CALCULATED.3IONS-SCNC: 10 MMOL/L (ref 7–15)
BUN SERPL-MCNC: 15.6 MG/DL (ref 6–20)
CALCIUM SERPL-MCNC: 9.2 MG/DL (ref 8.6–10)
CHLORIDE SERPL-SCNC: 104 MMOL/L (ref 98–107)
CREAT SERPL-MCNC: 0.75 MG/DL (ref 0.51–0.95)
CRP SERPL-MCNC: 13.5 MG/L
DEPRECATED HCO3 PLAS-SCNC: 28 MMOL/L (ref 22–29)
EGFRCR SERPLBLD CKD-EPI 2021: >90 ML/MIN/1.73M2
ERYTHROCYTE [SEDIMENTATION RATE] IN BLOOD BY WESTERGREN METHOD: 21 MM/HR (ref 0–30)
GLUCOSE SERPL-MCNC: 97 MG/DL (ref 70–99)
POTASSIUM SERPL-SCNC: 4.2 MMOL/L (ref 3.4–5.3)
SODIUM SERPL-SCNC: 142 MMOL/L (ref 135–145)

## 2024-03-14 PROCEDURE — 72040 X-RAY EXAM NECK SPINE 2-3 VW: CPT | Performed by: RADIOLOGY

## 2024-03-14 PROCEDURE — 36415 COLL VENOUS BLD VENIPUNCTURE: CPT | Performed by: PATHOLOGY

## 2024-03-14 PROCEDURE — 80048 BASIC METABOLIC PNL TOTAL CA: CPT | Performed by: PATHOLOGY

## 2024-03-14 PROCEDURE — 86140 C-REACTIVE PROTEIN: CPT | Performed by: PATHOLOGY

## 2024-03-14 PROCEDURE — 99214 OFFICE O/P EST MOD 30 MIN: CPT | Mod: 24 | Performed by: NURSE PRACTITIONER

## 2024-03-14 PROCEDURE — 85652 RBC SED RATE AUTOMATED: CPT | Performed by: PATHOLOGY

## 2024-03-14 PROCEDURE — 99024 POSTOP FOLLOW-UP VISIT: CPT | Performed by: ORTHOPAEDIC SURGERY

## 2024-03-14 RX ORDER — CEPHALEXIN 500 MG/1
500 CAPSULE ORAL 2 TIMES DAILY
Qty: 10 CAPSULE | Refills: 0 | Status: CANCELLED | OUTPATIENT
Start: 2024-03-14 | End: 2024-03-19

## 2024-03-14 RX ORDER — CEPHALEXIN 500 MG/1
500 CAPSULE ORAL 2 TIMES DAILY
Qty: 10 CAPSULE | Refills: 0 | Status: SHIPPED | OUTPATIENT
Start: 2024-03-14 | End: 2024-03-19

## 2024-03-14 RX ORDER — CYCLOBENZAPRINE HCL 10 MG
10 TABLET ORAL 3 TIMES DAILY PRN
Qty: 15 TABLET | Refills: 1 | Status: CANCELLED | OUTPATIENT
Start: 2024-03-14

## 2024-03-14 RX ORDER — CYCLOBENZAPRINE HCL 10 MG
10 TABLET ORAL 3 TIMES DAILY PRN
Qty: 15 TABLET | Refills: 1 | Status: SHIPPED | OUTPATIENT
Start: 2024-03-14 | End: 2024-05-28

## 2024-03-14 NOTE — PROGRESS NOTES
"  Assessment & Plan     Post-op neck pain  Spoke with Dr. Wilhelm who agreed to see her today. See Ortho note.     Low sodium levels  - Basic metabolic panel  (Ca, Cl, CO2, Creat, Gluc, K, Na, BUN)  tment Plan:[id number]}  Lab result: K/Na nl.       I spent a total of 30 minutes in the care of this pt during today's office visit. This time includes reviewing the patient's chart and prior history, obtaining a history, performing an examination and evaluation and counseling the patient. This time also includes ordering medications or tests necessary in addition to communication to other member's of the patient's health care team. Time spent in documentation and care coordination is included.     Kristine Iverson is a 54 year old, presenting for the following health issues:  Consult (Hyponatremia, check neck something tweaked last night when calling down, felt like ton of fluid and rip and burning )      3/14/2024    10:47 AM   Additional Questions   Roomed by [facility] EMT   Accompanied by Cheryl Step mother     Patient is accompanied by her step-mother.  She was recovering from neck surgery2/12/24 and was doing well, wearing her neck brace  when last night she lifted her arms up to summon her dog and felt a sharp \"popping \"like sensation in the posterior left aspect of her cervical spine.  Patient stated that she had shooting pain in the back of her neck with mild radicular symptoms to the left posterior shoulder blade.  Patient stated the pain was intense for approximately 5 to 10 minutes with a pulling like sensation.  Since that time patient has noticed that any movement of the left arm has exacerbated her pain in the same distribution.  Patient states that her neck is exquisitely tender to touch following this incident patient endorses no new draining from the incision.  Patient denies any fevers, chills, or other systemic symptoms. (HPI copied from Ortho note). She has taken a dilaudid last night and one this a.m. She " rates the pain as a 8-9/10 with any movement of her left arm.     Her after visit summary advised her to follow-up on a low sodium while hospitalized.    History of Present Illness       Reason for visit:  Annual low sodium    She eats 2-3 servings of fruits and vegetables daily.She consumes 3 sweetened beverage(s) daily.She exercises with enough effort to increase her heart rate 20 to 29 minutes per day.  She exercises with enough effort to increase her heart rate 3 or less days per week.   She is taking medications regularly.     Patient Active Problem List   Diagnosis     Chronic bilateral low back pain without sciatica     Facial cellulitis     Class 1 obesity with serious comorbidity and body mass index (BMI) of 32.0 to 32.9 in adult     Dental abscess     Hypoxia     Subcutaneous emphysema (H24)     Borderline personality disorder (H)     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)     Esophageal stricture     Obstruction of esophagus     HTN (hypertension)     Interstitial lung disease (H)     Moderate persistent asthma without complication     Onychomycosis     Restless leg syndrome     Seasonal allergies     Stress     Respiratory bronchiolitis interstitial lung disease (H)     Spinal epidural abscess     Lumbar spondylosis     Inflammatory arthritis     Arthralgia of temporomandibular joint     Articular disc disorder of temporomandibular joint     Derangement of temporomandibular joint     Calculus of gallbladder without cholecystitis without obstruction     Displacement of articular disc of temporomandibular joint with reduction     Myofascial pain     Oral lesion     Symptomatic cholelithiasis     Sacro-iliac pain     Degenerative lumbar spinal stenosis     Glucose intolerance     Chronic neck pain     Lumbar radiculopathy, chronic     Cervical radiculopathy     Acute pain of right shoulder     Other osteoporosis without current pathological fracture     Diaphragmatic hernia     Bipolar 2 disorder  (H)     Choking sensation     Gastro-esophageal reflux disease with esophagitis     Limited opening of mandible     History of pelvic surgery     Voiding dysfunction     Pelvic floor dysfunction     Urinary hesitancy     Urinary frequency     S/P spinal fusion     Cannabis abuse, daily use     Past Surgical History:   Procedure Laterality Date     CERVICAL FUSION       COLONOSCOPY       COLONOSCOPY N/A 02/06/2020    Procedure: COLONOSCOPY, WITH POLYPECTOMY AND BIOPSY;  Surgeon: Julian Mccullough MD;  Location:  GI     CYSTOSCOPY       ENT SURGERY       ESOPHAGOSCOPY, GASTROSCOPY, DUODENOSCOPY (EGD), COMBINED N/A 02/06/2020    Procedure: ESOPHAGOGASTRODUODENOSCOPY (EGD);  Surgeon: Julian Mccullough MD;  Location:  GI     EXCISE LESION INTRAORAL Bilateral 10/03/2018    Procedure: EXCISE LESION INTRAORAL;  Wide Local Excision Of of Left Oral Cavity Ulcer;  Surgeon: Morro Mijares MD;  Location: [facility] OR     GYN SURGERY       HC DRAIN SKIN ABSCESS SIMPLE/SINGLE  03/16/2012    Procedure:INCISION AND DRAINAGE, ABSCESS, SIMPLE; Surgeon:CHRISTIANO HANCOCK; Location: GI     HEAD & NECK SURGERY       HYSTERECTOMY       HYSTERECTOMY       INCISION AND DRAINAGE ABDOMEN WASHOUT, COMBINED       INJECT EPIDURAL CERVICAL N/A 10/14/2021    Procedure: Cervical 7- Thoracic 1 epidural steroid injection with fluoroscopy;  Surgeon: Tram Florian MD;  Location: [facility] OR     INJECT EPIDURAL LUMBAR Right 09/15/2020    Procedure: Lumbar5- sacral 1 epidural steroid injection with fluoroscopy;  Surgeon: Tram Florian MD;  [facility]     INJECT EPIDURAL LUMBAR Right 06/29/2021    Procedure: Lumbar 5 sacral 1 epidural steroid injection with fluoroscopy;  Surgeon: Tram Florian MD;  Location: [facility] OR     INJECT SACROILIAC JOINT Bilateral 06/16/2020    Procedure: Bilateral sacroiliac joint steroid injection with fluoroscopy;  Surgeon: Tram Florian MD;  [facility]     LAMINECTOMY THORACIC ONE LEVEL N/A  08/19/2019    Procedure: LAMINECTOMY, SPINE, THORACIC, 11-12 and Part of Lumbar 1, DRAINAGE OF EPIDURAL ABCESS, Epidural Drain Placement X 2;  Surgeon: Yadiel Beal MD;  Location: [facility] OR     OPTICAL TRACKING SYSTEM FUSION POSTERIOR CERVICAL THREE + LEVELS N/A 2/12/2024    Procedure: Posterior instrumented spinal fusion cervical 2-5; laminectomies cervical 3-4; use of local autograft and crushed cancellous allograft.;  Surgeon: Philip Wilhelm MD;  Location: [facility]     OPTICAL TRACKING SYSTEM FUSION SPINE POSTERIOR LUMBAR THREE+ LEVELS N/A 11/30/2022    Procedure: Posterior Instrumented Spinal Fusion Thoracic 8 to Sacrum with Bilateral Pelvic Fixation; Transforaminal Lumbar Interbody Fusion with Erickson-Nixon Osteotomy Lumbar 1 to Sacral 1 (5 levels); Use of Infuse Bone Morphogenetic Protein Large Kit and Allograft;  Surgeon: Philip Wilhelm MD;  Location:  OR     ORTHOPEDIC SURGERY       NJ PERCUT IMPLNT NEUROELECT,EPIDURAL N/A 08/08/2019    Procedure: TRIAL, SPINAL CORD STIMULATOR WITH BOSTON Loop;  Surgeon: Sipple, Daniel Peter, DO;  Location: [facility];  Service: Pain     RADIO FREQUENCY ABLATION / DESTRUCTION OF SACROILOAC JOINT DORSAL PRIMARY RAMUS Bilateral 12/17/2019    Procedure: Bilateral lumbar radiofrequency ablation with fluoroscopy and intravenous sedation ( Lumbar 2,3,4,5 medial branch nerves for the bilateral lumbar3-4, 4-5 and 5-sacral1 joints.;  Surgeon: Tram Florian MD;  Location:  OR     RADIO FREQUENCY ABLATION / DESTRUCTION OF SACROILOAC JOINT DORSAL PRIMARY RAMUS Right 11/17/2020    Procedure: Right lumbar medial branch nerve radiofrequency ablation right L2,3,4,5 nerves supplying the right L3-4, L4-5 and L5-S1 facet joints;  Surgeon: Tram Florian MD;  [place] [facility]     spinal cord stimulator  08/08/2019     spinal cord stimulator removal  08/13/2019         Objective    /83 (BP Location: Right arm, Patient Position: Sitting, Cuff  "Size: Adult Regular)   Pulse 69   Ht 1.626 m (5' 4.02\")   Wt 87.5 kg (193 lb)   LMP  (LMP Unknown)   SpO2 93%   BMI 33.11 kg/m    Body mass index is 33.11 kg/m .  Physical Exam   GENERAL:She is wearing a neck brace.  Holding her left arm close to her trunk.    CV: see VS  PSYCH: mentation appears normal, affect normal. She appears anxious.          Signed Electronically by: NICKO Lynn CNP    "

## 2024-03-14 NOTE — PROGRESS NOTES
"Spine Surgical Hx:  10/16/2017 - ACDF with plate fixation C3-C5 (2 levels); use of Cornerstone allograft (Dr. Rylan Monique, [facility]).  [Implants: Medtronic Zevo plate].  08/08/2019 - SCS implantation ([facility]); complicated by epidural abscess MSSA culture positive, treated with drainage/laminectomy and IV antibiotics.  Thus, the SCS was removed.  08/19/2019 - Laminectomies T11-L1 (T12-L2) ([facility]  11/30/2022 - PISF T8-pelvis with bilateral stacked pelvic fixation; TLIF-SPO L1-S1 (5 levels); use of Infuse BMP and allograft (Choco/Shavon) for multilevel sponydylosis and stenosis, thoracolumbar junction kyphosis.  [Implants: Medtronic Solera, 5.5 mm CoCr rods x4 (UNID rods x2); Capstone Control PTC cages; SI-Bone Granite screws x 4].  02/12/2024 - PISF C2-C5; laminectomies C3 and C4; use of allograft (Cherybrano) for pseudarthrosis C3-C5; spondylolisthesis C2-3.  [Implants: Medtronic Infinity screw system, 3.5 mm CoCr rods x2].    BMD Hx:  11/24/21 - DEXA spine/hip/wrist.  T-score = -1.1 (Left femoral neck).  Imp: Osteopenia.  12/8/21 - Saw Dr. Guerra (PM&R).  P> RTC 6 wks; had not been seen since.         In-Person Visit    Chief Complaint   Patient presents with    RECHECK     Return       S>  54 year old female, RHD, now 4 weeks post op s/p C2-C5 PISF and C3/C4 laminectomy.       Patient was recovering well without issue, when last night she lifted her arms up to summon her dog and felt a sharp \"popping \"like sensation in the posterior left aspect of her cervical spine.  Patient stated that she had shooting pain in the back of her neck with mild radicular symptoms to the left posterior shoulder blade.  Patient stated the pain was intense for approximately 5 to 10 minutes with a pulling like sensation.  Since that time patient has noticed that any movement of the left arm has exacerbated her pain in the same distribution.  Patient states that her neck is exquisitely tender to touch following this " incident patient endorses no new draining from the incision.  Patient denies any fevers, chills, or other systemic symptoms.      Oswestry (KATIE) Questionnaire        3/14/2024    11:54 AM   OSWESTRY DISABILITY INDEX   Count 9   Sum 17   Oswestry Score (%) 37.78 %      S/p PISF T8-pelvis (11/30/22):  KATIE preop          70%  6wk                    40%  3mo                   45%  6mo                   18%  1yr                     24.44%      Neck Disability Index (NDI) Questionnaire        11/29/2023    12:25 PM   Neck Disability Index (NDI)   Neck Disability Index: Count 9   NDI: Total Score = SUM (points for all 10 findings) Incomplete   Neck Disability in Percent = (Total Score) / 50 * 100 Incomplete      NDI preop 35.56%  4wk  NR      Visual Analog Pain Scale  Back Pain Scale 0-10: 0  Right leg pain: 0  Left leg pain: 0  Neck Pain Scale 0-10: 7 (neck pain)  Right arm pain: 0  Left arm pain: 2 (upper arm pain)    PROMIS-10 Scores  Global Mental Health Score: 13  Global Physical Health Score: 11  PROMIS TOTAL - SUBSCORES: 24    O>   Alert, oriented x 3, cooperative.  Not in CP distress.  LMP  (LMP Unknown)   Surgical incision(s) well-healed, no sign of infection.  Ambulates independently.  Grossly neurologically intact.    Imaging:    Radiographs of the cervical spine were taken today and compared to previous radiographs on 02/15/2024 which demonstrate stable well-positioned hardware without complications.  There is previously demonstrated fracture of the cephalad most ACDF screws which is unchanged on this repeat radiographs.  No signs of new fracture or complication.    Labs:     3/14/24  CRP (<5 mg/L)  13.50  ESR (0-30 mm/hr) 21      A>   54 year old female, RHD, now    Cervical spine:  1.  4 weeks s/p PISF C2-C5 (2/12/24) for pseudarthrosis s/p ACDF, presenting with acute L sided neck pain x 1 day.  Thoracolumbar spine:  1.  >1 yr s/p PISF T8-pelvis; TLIF-SPO L1-S1 (5 levels) (11/20/2022), doing well, with  improved posture and preoperative pain.  2.  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  3.  Suprajacent spondylosis T7-8; asymptomatic.    P>    We had a good discussion with the patient today.  In regards to her acute onset left-sided cervical neck pain, we discussed how her imaging findings are reassuring without signs of new hardware failure or loosening.  On examination her incision has mild estela-incisional erythema which could represent simple irritation from the [place] J collar versus superficial skin infection.  With this in mind we find prudent to evaluate with inflammatory labs such as ESR and/or CRP as well as prescribe a 5-day course of Keflex.    For her acute onset left-sided neck pain, it is likely related to muscle spasm and/or deep suture failure.  At this time there are no concerning signs of new or worsening radicular pain or numbness or tingling.  We will prescribe patient 10 mg 3 times daily as needed tizanidine 4 muscular relaxation. Patient has follow-up with Dr. Krishnan in 2 weeks for 6-week postop follow-up.  At that time we will plan to have repeat blood draw for ESR and CRP but no need for new radiographs at that time.    - RTC in 2 weeks for 6 weeks post op follow-up  - 5 day 500mg Keflex TID   - 10mg TID Tizanidine   - CRP/ESR on way out (done).  ESR within normal limits; CRP slightly elevated but not concerning for surgical site infection.    Bran Marquez MD  Orthopedic Surgery PGY1    Attestation:  I (Dr. Philip Wilhelm - Spine Surgeon) have personally evaluated patient with PGY-1 Jimmy, and agree with findings and plan outlined in the note, which I also edited.  I discussed at length with the patient/family, explained the nature of spinal condition, and formulated workup and/or treatment plan together.  All questions were answered to the best of my ability and to patient's apparent satisfaction.     Philip Wilhelm MD  Associate  Professor  Orthopaedic Spine Surgery  Dept Orthopaedic Surgery, [facility]  [phone] office, [phone] pager  [email or web address]

## 2024-03-14 NOTE — LETTER
"    3/14/2024         RE: Zayra Ramirez  [address] [place] [address]        Dear Colleague,    Thank you for referring your patient, Zayra Ramirez, to the [facility] [place]. Please see a copy of my visit note below.    Spine Surgical Hx:  10/16/2017 - ACDF with plate fixation C3-C5 (2 levels); use of Cornerstone allograft (Dr. Rylan Monique, [facility]).  [Implants: Medtronic Zevo plate].  08/08/2019 - SCS implantation ([facility]); complicated by epidural abscess MSSA culture positive, treated with drainage/laminectomy and IV antibiotics.  Thus, the SCS was removed.  08/19/2019 - Laminectomies T11-L1 (T12-L2) ([facility]).  11/30/2022 - PISF T8-pelvis with bilateral stacked pelvic fixation; TLIF-SPO L1-S1 (5 levels); use of Infuse BMP and allograft (Choco/Shavon) for multilevel sponydylosis and stenosis, thoracolumbar junction kyphosis.  [Implants: Medtronic Solera, 5.5 mm CoCr rods x4 (UNID rods x2); Capstone Control PTC cages; SI-Bone Granite screws x 4].  02/12/2024 - PISF C2-C5; laminectomies C3 and C4; use of allograft (Cherybrano) for pseudarthrosis C3-C5; spondylolisthesis C2-3.  [Implants: Medtronic Infinity screw system, 3.5 mm CoCr rods x2].    BMD Hx:  11/24/21 - DEXA spine/hip/wrist.  T-score = -1.1 (Left femoral neck).  Imp: Osteopenia.  12/8/21 - Saw Dr. Guerra (PM&R).  P> RTC 6 wks; had not been seen since.         In-Person Visit    Chief Complaint   Patient presents with    RECHECK     Return       S>  54 year old female, RHD, now 4 weeks post op s/p C2-C5 PISF and C3/C4 laminectomy.       Patient was recovering well without issue, when last night she lifted her arms up to summon her dog and felt a sharp \"popping \"like sensation in the posterior left aspect of her cervical spine.  Patient stated that she had shooting pain in the back of her neck with mild radicular symptoms to the left posterior shoulder blade.  Patient stated the pain was intense " for approximately 5 to 10 minutes with a pulling like sensation.  Since that time patient has noticed that any movement of the left arm has exacerbated her pain in the same distribution.  Patient states that her neck is exquisitely tender to touch following this incident patient endorses no new draining from the incision.  Patient denies any fevers, chills, or other systemic symptoms.      Oswestry (KATIE) Questionnaire        3/14/2024    11:54 AM   OSWESTRY DISABILITY INDEX   Count 9   Sum 17   Oswestry Score (%) 37.78 %      S/p PISF T8-pelvis (11/30/22):  KATIE preop          70%  6wk                    40%  3mo                   45%  6mo                   18%  1yr                     24.44%      Neck Disability Index (NDI) Questionnaire        11/29/2023    12:25 PM   Neck Disability Index (NDI)   Neck Disability Index: Count 9   NDI: Total Score = SUM (points for all 10 findings) Incomplete   Neck Disability in Percent = (Total Score) / 50 * 100 Incomplete      NDI preop 35.56%  4wk  NR      Visual Analog Pain Scale  Back Pain Scale 0-10: 0  Right leg pain: 0  Left leg pain: 0  Neck Pain Scale 0-10: 7 (neck pain)  Right arm pain: 0  Left arm pain: 2 (upper arm pain)    PROMIS-10 Scores  Global Mental Health Score: 13  Global Physical Health Score: 11  PROMIS TOTAL - SUBSCORES: 24    O>   Alert, oriented x 3, cooperative.  Not in CP distress.  LMP  (LMP Unknown)   Surgical incision(s) well-healed, no sign of infection.  Ambulates independently.  Grossly neurologically intact.    Imaging:    Radiographs of the cervical spine were taken today and compared to previous radiographs on 02/15/2024 which demonstrate stable well-positioned hardware without complications.  There is previously demonstrated fracture of the cephalad most ACDF screws which is unchanged on this repeat radiographs.  No signs of new fracture or complication.    Labs:     3/14/24  CRP (<5 mg/L)  13.50  ESR (0-30 mm/hr) 21      A>   54 year old  female, RHD, now    Cervical spine:  1.  4 weeks s/p PISF C2-C5 (2/12/24) for pseudarthrosis s/p ACDF, presenting with acute L sided neck pain x 1 day.  Thoracolumbar spine:  1.  >1 yr s/p PISF T8-pelvis; TLIF-SPO L1-S1 (5 levels) (11/20/2022), doing well, with improved posture and preoperative pain.  2.  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  3.  Suprajacent spondylosis T7-8; asymptomatic.    P>    We had a good discussion with the patient today.  In regards to her acute onset left-sided cervical neck pain, we discussed how her imaging findings are reassuring without signs of new hardware failure or loosening.  On examination her incision has mild estela-incisional erythema which could represent simple irritation from the [place] J collar versus superficial skin infection.  With this in mind we find prudent to evaluate with inflammatory labs such as ESR and/or CRP as well as prescribe a 5-day course of Keflex.    For her acute onset left-sided neck pain, it is likely related to muscle spasm and/or deep suture failure.  At this time there are no concerning signs of new or worsening radicular pain or numbness or tingling.  We will prescribe patient 10 mg 3 times daily as needed tizanidine 4 muscular relaxation. Patient has follow-up with Dr. Krishnan in 2 weeks for 6-week postop follow-up.  At that time we will plan to have repeat blood draw for ESR and CRP but no need for new radiographs at that time.    - RTC in 2 weeks for 6 weeks post op follow-up  - 5 day 500mg Keflex TID   - 10mg TID Tizanidine   - CRP/ESR on way out (done).  ESR within normal limits; CRP slightly elevated but not concerning for surgical site infection.    Bran Marquez MD  Orthopedic Surgery PGY1    Attestation:  I (Dr. Philip Wilhelm - Spine Surgeon) have personally evaluated patient with PGY-1 Jimmy, and agree with findings and plan outlined in the note, which I also edited.  I discussed at length with the  patient/family, explained the nature of spinal condition, and formulated workup and/or treatment plan together.  All questions were answered to the best of my ability and to patient's apparent satisfaction.     Philip Wilhelm MD    Orthopaedic Spine Surgery  Dept Orthopaedic Surgery, [facility]  [phone] office, [phone] pager  [email or web address]

## 2024-03-15 ENCOUNTER — MYC REFILL (OUTPATIENT)
Dept: ORTHOPEDICS | Facility: CLINIC | Age: 54
End: 2024-03-15
Payer: COMMERCIAL

## 2024-03-15 DIAGNOSIS — Z98.1 S/P SPINAL FUSION: ICD-10-CM

## 2024-03-15 RX ORDER — HYDROMORPHONE HYDROCHLORIDE 4 MG/1
4 TABLET ORAL EVERY 4 HOURS PRN
Qty: 42 TABLET | Refills: 0 | Status: SHIPPED | OUTPATIENT
Start: 2024-03-15 | End: 2024-03-22

## 2024-03-15 RX ORDER — OXYCODONE HYDROCHLORIDE 5 MG/1
5-10 TABLET ORAL EVERY 6 HOURS PRN
Qty: 56 TABLET | Refills: 0 | Status: CANCELLED | OUTPATIENT
Start: 2024-03-15

## 2024-03-15 NOTE — TELEPHONE ENCOUNTER
Pdmp reviewed.  Patient wants to switch from oxycodone to a different medication.  Provided Dilaudid 4 mg every 4 hours max of 6 tabs per day.    Bishop ASHANTI Kiran PA-C

## 2024-03-17 NOTE — PLAN OF CARE
Status: s/p epidural abscess removal  Vitals: VSS w/ 3L through CPAP overnight.   Neuros: Intact ex intermittent numbness to outer right thigh.   IV: PIV SL b/w abx.   Resp/trach:  CPAP throughout night. RA while awake. LS clear.   Diet: Regular diet.   Bowel status: no BM overnight. Passing flatus.  : Voiding in BR w/o difficulty.  Skin: Incision to mid back w/ marked dressing. Hemovac x2 w/ small output.  Pain: Given PO oxy & scheduled Tylenol for incisional back pain & soreness.  Activity: Up w/ 1, GB, walker.    Social: No visitors overnight.   Plan: Continue to monitor & follow POC.   [[id number]]

## 2024-03-22 ENCOUNTER — MYC MEDICAL ADVICE (OUTPATIENT)
Dept: SLEEP MEDICINE | Facility: CLINIC | Age: 54
End: 2024-03-22
Payer: COMMERCIAL

## 2024-03-25 DIAGNOSIS — G25.81 RESTLESS LEG SYNDROME: ICD-10-CM

## 2024-03-25 RX ORDER — ROPINIROLE 0.5 MG/1
0.5 TABLET, FILM COATED ORAL AT BEDTIME
Qty: 90 TABLET | Refills: 1 | Status: SHIPPED | OUTPATIENT
Start: 2024-03-25 | End: 2024-09-18

## 2024-03-25 NOTE — TELEPHONE ENCOUNTER
I called Zayra to talk about her symptoms. She went through surgery and was on some pain medication. She recently got off of them (starting last Wednesday), so it sounds like she was having some rebound RLS symptoms. Last night was not quite as bad.   She was told she could try taking an extra tablet for a few days, then cut the extra tablet in half for a couple of days, and then go back down to 1 tablet.   She will let me know if she has any further problems.  Bennett Goltz, PA-C

## 2024-03-27 ENCOUNTER — MYC MEDICAL ADVICE (OUTPATIENT)
Dept: ORTHOPEDICS | Facility: CLINIC | Age: 54
End: 2024-03-27
Payer: COMMERCIAL

## 2024-03-27 DIAGNOSIS — R22.1 NECK SWELLING: ICD-10-CM

## 2024-03-27 DIAGNOSIS — L08.9 SUPERFICIAL SKIN INFECTION: Primary | ICD-10-CM

## 2024-03-27 DIAGNOSIS — Z98.1 S/P CERVICAL SPINAL FUSION: ICD-10-CM

## 2024-03-27 DIAGNOSIS — M54.2 NECK PAIN ON LEFT SIDE: ICD-10-CM

## 2024-03-28 ENCOUNTER — HOSPITAL ENCOUNTER (OUTPATIENT)
Dept: MRI IMAGING | Facility: CLINIC | Age: 54
Discharge: HOME | End: 2024-03-28
Attending: ORTHOPAEDIC SURGERY | Admitting: ORTHOPAEDIC SURGERY
Payer: COMMERCIAL

## 2024-03-28 DIAGNOSIS — M54.2 NECK PAIN ON LEFT SIDE: ICD-10-CM

## 2024-03-28 DIAGNOSIS — Z98.1 S/P CERVICAL SPINAL FUSION: ICD-10-CM

## 2024-03-28 DIAGNOSIS — L08.9 SUPERFICIAL SKIN INFECTION: ICD-10-CM

## 2024-03-28 DIAGNOSIS — R22.1 NECK SWELLING: ICD-10-CM

## 2024-03-28 PROCEDURE — A9585 GADOBUTROL INJECTION: HCPCS | Performed by: ORTHOPAEDIC SURGERY

## 2024-03-28 PROCEDURE — 72156 MRI NECK SPINE W/O & W/DYE: CPT

## 2024-03-28 PROCEDURE — 255N000002 HC RX 255 OP 636: Performed by: ORTHOPAEDIC SURGERY

## 2024-03-28 RX ORDER — GADOBUTROL 604.72 MG/ML
9 INJECTION INTRAVENOUS ONCE
Status: COMPLETED | OUTPATIENT
Start: 2024-03-28 | End: 2024-03-28

## 2024-03-28 RX ADMIN — GADOBUTROL 9 ML: 604.72 INJECTION INTRAVENOUS at 14:04

## 2024-03-28 NOTE — TELEPHONE ENCOUNTER
See My  Chart message from pt last night at 1600 C/O reoccurance of swelling & pain in neck after completing 5 days of keflex.    See dictation 3-14-24.  Has repeat Labs ESR & CRP scheduled before RTN appt this Tues 4-2-24.    I reviewed with  who reviewed imaging & chart & ordered does not need repeat keflex & ordered MRI CS to be done before appt. 4-2-24.  I called pt who agreed with plan,.  I verified pt is afeb.  Ordered & scheduled.  Call back prn. Pt agree.d  V.O.R.B./Rosy Hunter RN.

## 2024-04-02 ENCOUNTER — ANCILLARY PROCEDURE (OUTPATIENT)
Dept: MAMMOGRAPHY | Facility: CLINIC | Age: 54
End: 2024-04-02
Attending: NURSE PRACTITIONER
Payer: COMMERCIAL

## 2024-04-02 ENCOUNTER — OFFICE VISIT (OUTPATIENT)
Dept: ORTHOPEDICS | Facility: CLINIC | Age: 54
End: 2024-04-02
Payer: COMMERCIAL

## 2024-04-02 ENCOUNTER — LAB (OUTPATIENT)
Dept: LAB | Facility: CLINIC | Age: 54
End: 2024-04-02
Payer: COMMERCIAL

## 2024-04-02 VITALS — WEIGHT: 188.5 LBS | BODY MASS INDEX: 33.4 KG/M2 | HEIGHT: 63 IN

## 2024-04-02 DIAGNOSIS — G89.18 POSTOPERATIVE PAIN: ICD-10-CM

## 2024-04-02 DIAGNOSIS — L08.9 SUPERFICIAL SKIN INFECTION: ICD-10-CM

## 2024-04-02 DIAGNOSIS — Z98.1 S/P CERVICAL SPINAL FUSION: Primary | ICD-10-CM

## 2024-04-02 LAB
CRP SERPL-MCNC: 17.5 MG/L
ERYTHROCYTE [SEDIMENTATION RATE] IN BLOOD BY WESTERGREN METHOD: 21 MM/HR (ref 0–30)

## 2024-04-02 PROCEDURE — 36415 COLL VENOUS BLD VENIPUNCTURE: CPT | Performed by: PATHOLOGY

## 2024-04-02 PROCEDURE — 85652 RBC SED RATE AUTOMATED: CPT | Performed by: PATHOLOGY

## 2024-04-02 PROCEDURE — 99024 POSTOP FOLLOW-UP VISIT: CPT | Performed by: ORTHOPAEDIC SURGERY

## 2024-04-02 PROCEDURE — 77067 SCR MAMMO BI INCL CAD: CPT | Mod: GC | Performed by: RADIOLOGY

## 2024-04-02 PROCEDURE — 86140 C-REACTIVE PROTEIN: CPT | Performed by: PATHOLOGY

## 2024-04-02 NOTE — PROGRESS NOTES
Spine Surgical Hx:  10/16/2017 - ACDF with plate fixation C3-C5 (2 levels); use of Cornerstone allograft (Dr. Rylan Monique, [facility]).  [Implants: Medtronic Zevo plate].  08/08/2019 - SCS implantation ([place] OR); complicated by epidural abscess MSSA culture positive, treated with drainage/laminectomy and IV antibiotics.  Thus, the SCS was removed.  08/19/2019 - Laminectomies T11-L1 (T12-L2) ([facility]  11/30/2022 - PISF T8-pelvis with bilateral stacked pelvic fixation; TLIF-SPO L1-S1 (5 levels); use of Infuse BMP and allograft (Choco/Shavon) for multilevel sponydylosis and stenosis, thoracolumbar junction kyphosis.  [Implants: Medtronic Solera, 5.5 mm CoCr rods x4 (UNID rods x2); Capstone Control PTC cages; SI-Bone Granite screws x 4].  02/12/2024 - PISF C2-C5; laminectomies C3 and C4; use of allograft (Cherybrano) for pseudarthrosis C3-C5; spondylolisthesis C2-3.  [Implants: Medtronic Infinity screw system, 3.5 mm CoCr rods x2].     BMD Hx:  11/24/21 - DEXA spine/hip/wrist.  T-score = -1.1 (Left femoral neck).  Imp: Osteopenia.  12/8/21 - Saw Dr. Guerra ([facility]).  P> RTC 6 wks; had not been seen since.      In-Person Visit    No chief complaint on file.      S>  54 year old female, RHD, 7 wks postop; last seen at 4 wks, when she felt a popping sensation in the posterior left aspect of her neck.    Accompanied by mom.  Per patient, happy to note that the acute pain flareup that she had 2 weeks ago is now better, and settling down.  At time of last visit, her pain was more in the left side.,  It may be a little bit more to the right.  At any rate, not as intense or as bothersome as it was.  Continues to wear [place] J collar.  Has not yet started postoperative PT.  Off opioids.  Feels that surgery definitely helped.  She is not getting hand/finger numbness, tingling as before.  Neck also feels much more stable.    Would like to get back to driving.    Her lower back continues to do great.      Oswestry (KATIE)  "Questionnaire        3/26/2024    11:50 AM   OSWESTRY DISABILITY INDEX   Count 9   Sum 10   Oswestry Score (%) 22.22 %      S/p PISF T8-pelvis (11/30/22):  KATIE preop          70%  6wk                    40%  3mo                   45%  6mo                   18%  1yr                     24.44%  1.5yr  22.22%      Neck Disability Index (NDI) Questionnaire        3/26/2024    11:55 AM   Neck Disability Index (NDI)   Neck Disability Index: Count 8   NDI: Total Score = SUM (points for all 10 findings) Incomplete   Neck Disability in Percent = (Total Score) / 50 * 100 Incomplete      Neck Disability Index (NDI) Questionnaire        3/26/2024    11:55 AM   Neck Disability Index   Count 8   Sum 10   Raw Score: /50 12.5      S/p PISF C2-C5; laminectomies C3,C4 (2/12/24):  NDI preop          35.56%  4wk                    NR  2mo  25%      Visual Analog Pain Scale  Back Pain Scale 0-10: 0  Right leg pain: 0  Left leg pain: 0  Neck Pain Scale 0-10: 2 (rt side neck pain)  Right arm pain: 0  Left arm pain: 0    PROMIS-10 Scores  Global Mental Health Score: (P) 13  Global Physical Health Score: (P) 15  PROMIS TOTAL - SUBSCORES: (P) 28    O>   Alert, oriented x 3, cooperative.  Not in CP distress.  Ht 1.595 m (5' 2.8\")   Wt 85.5 kg (188 lb 8 oz)   LMP  (LMP Unknown)   BMI 33.60 kg/m    Surgical incision (midline posterior neck) well-healed, no sign of infection.  There is some redness around the incision, likely due to rubbing from the [place] J collar.  However, no fluctuance, no drainage.  She has some asymmetry in neck posture, with head tilted to the right.  Ambulates independently.  Grossly neurologically intact.    Imaging:    Cervical MRI obtained today, compared against preoperative MRI.  Improved canal dimensions, with decompression of cervical spinal cord.  Postsurgical changes.  No concerning findings.    Labs:                                         3/14/24 4/2  CRP (<5 mg/L)               13.50 17.50  ESR (0-30 mm/hr) "          21 21    A>   54 year old female, RHD, now    Cervical spine:  1.  2 mos s/p PISF C2-C5 (2/12/24) for pseudarthrosis s/p ACDF, with improved preoperative symptoms, also with resolved acute left-sided neck pain that she experienced at 4 weeks postop.  Thoracolumbar spine:  1.  >1 yr s/p PISF T8-pelvis; TLIF-SPO L1-S1 (5 levels) (11/20/2022), doing well, with improved posture and preoperative pain.  2.  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  3.  Suprajacent spondylosis T7-8; asymptomatic.    P>    Congratulated and reassured patient.  At this point, I recommend starting postoperative physical therapy, for upper extremity strengthening and neck postural exercises.  Patient prefers to do it at [facility].  May advance lifting restrictions, from current 10 pounds, to 20 pounds.  May also resume driving.  May also gradually wean off [place] J collar wear.    Return to clinic in 6 weeks (3 months postoperative) c/o spine PAUL clinic, with cervical AP lateral x-rays.      Philip Wilhelm MD    Orthopaedic Spine Surgery  Dept Orthopaedic Surgery, [facility]  [phone] office, [phone] pager  [email or web address]

## 2024-04-02 NOTE — LETTER
4/2/2024         RE: Zayra Ramirez  [address] [place] [address]        Dear Colleague,    Thank you for referring your patient, Zayra Ramirez, to the [facility] [place]. Please see a copy of my visit note below.    Spine Surgical Hx:  10/16/2017 - ACDF with plate fixation C3-C5 (2 levels); use of Cornerstone allograft (Dr. Rylan Monique, [facility]).  [Implants: Medtronic Zevo plate].  08/08/2019 - SCS implantation ([place] OR); complicated by epidural abscess MSSA culture positive, treated with drainage/laminectomy and IV antibiotics.  Thus, the SCS was removed.  08/19/2019 - Laminectomies T11-L1 (T12-L2) ([facility]).  11/30/2022 - PISF T8-pelvis with bilateral stacked pelvic fixation; TLIF-SPO L1-S1 (5 levels); use of Infuse BMP and allograft (Choco/Shavon) for multilevel sponydylosis and stenosis, thoracolumbar junction kyphosis.  [Implants: Medtronic Solera, 5.5 mm CoCr rods x4 (UNID rods x2); Capstone Control PTC cages; SI-Bone Granite screws x 4].  02/12/2024 - PISF C2-C5; laminectomies C3 and C4; use of allograft (Cherybrano) for pseudarthrosis C3-C5; spondylolisthesis C2-3.  [Implants: Medtronic Infinity screw system, 3.5 mm CoCr rods x2].     BMD Hx:  11/24/21 - DEXA spine/hip/wrist.  T-score = -1.1 (Left femoral neck).  Imp: Osteopenia.  12/8/21 - Saw Dr. Guerra ([facility]).  P> RTC 6 wks; had not been seen since.      In-Person Visit    No chief complaint on file.      S>  54 year old female, RHD, 7 wks postop; last seen at 4 wks, when she felt a popping sensation in the posterior left aspect of her neck.    Accompanied by mom.  Per patient, happy to note that the acute pain flareup that she had 2 weeks ago is now better, and settling down.  At time of last visit, her pain was more in the left side.,  It may be a little bit more to the right.  At any rate, not as intense or as bothersome as it was.  Continues to wear [place] J collar.  Has not yet started  "postoperative PT.  Off opioids.  Feels that surgery definitely helped.  She is not getting hand/finger numbness, tingling as before.  Neck also feels much more stable.    Would like to get back to driving.    Her lower back continues to do great.      Oswestry (KATIE) Questionnaire        3/26/2024    11:50 AM   OSWESTRY DISABILITY INDEX   Count 9   Sum 10   Oswestry Score (%) 22.22 %      S/p PISF T8-pelvis (11/30/22):  KATIE preop          70%  6wk                    40%  3mo                   45%  6mo                   18%  1yr                     24.44%  1.5yr  22.22%      Neck Disability Index (NDI) Questionnaire        3/26/2024    11:55 AM   Neck Disability Index (NDI)   Neck Disability Index: Count 8   NDI: Total Score = SUM (points for all 10 findings) Incomplete   Neck Disability in Percent = (Total Score) / 50 * 100 Incomplete      Neck Disability Index (NDI) Questionnaire        3/26/2024    11:55 AM   Neck Disability Index   Count 8   Sum 10   Raw Score: /50 12.5      S/p PISF C2-C5; laminectomies C3,C4 (2/12/24):  NDI preop          35.56%  4wk                    NR  2mo  25%      Visual Analog Pain Scale  Back Pain Scale 0-10: 0  Right leg pain: 0  Left leg pain: 0  Neck Pain Scale 0-10: 2 (rt side neck pain)  Right arm pain: 0  Left arm pain: 0    PROMIS-10 Scores  Global Mental Health Score: (P) 13  Global Physical Health Score: (P) 15  PROMIS TOTAL - SUBSCORES: (P) 28    O>   Alert, oriented x 3, cooperative.  Not in CP distress.  Ht 1.595 m (5' 2.8\")   Wt 85.5 kg (188 lb 8 oz)   LMP  (LMP Unknown)   BMI 33.60 kg/m    Surgical incision (midline posterior neck) well-healed, no sign of infection.  There is some redness around the incision, likely due to rubbing from the [place] J collar.  However, no fluctuance, no drainage.  She has some asymmetry in neck posture, with head tilted to the right.  Ambulates independently.  Grossly neurologically intact.    Imaging:    Cervical MRI obtained today, " compared against preoperative MRI.  Improved canal dimensions, with decompression of cervical spinal cord.  Postsurgical changes.  No concerning findings.    Labs:                                         3/14/24 4/2  CRP (<5 mg/L)               13.50 17.50  ESR (0-30 mm/hr)          21 21    A>   54 year old female, RHD, now    Cervical spine:  1.  2 mos s/p PISF C2-C5 (2/12/24) for pseudarthrosis s/p ACDF, with improved preoperative symptoms, also with resolved acute left-sided neck pain that she experienced at 4 weeks postop.  Thoracolumbar spine:  1.  >1 yr s/p PISF T8-pelvis; TLIF-SPO L1-S1 (5 levels) (11/20/2022), doing well, with improved posture and preoperative pain.  2.  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  3.  Suprajacent spondylosis T7-8; asymptomatic.    P>    Congratulated and reassured patient.  At this point, I recommend starting postoperative physical therapy, for upper extremity strengthening and neck postural exercises.  [facility]  May advance lifting restrictions, from current 10 pounds, to 20 pounds.  May also resume driving.  May also gradually wean off [place] J collar wear.    Return to clinic in 6 weeks (3 months postoperative) c/o spine PAUL clinic, with cervical AP lateral x-rays.      Philip Wilhelm MD    Orthopaedic Spine Surgery  Dept Orthopaedic Surgery, [facility]  [phone] office, [phone] pager  [email or web address]

## 2024-04-03 ENCOUNTER — THERAPY VISIT (OUTPATIENT)
Dept: PHYSICAL THERAPY | Facility: CLINIC | Age: 54
End: 2024-04-03
Attending: ORTHOPAEDIC SURGERY
Payer: COMMERCIAL

## 2024-04-03 DIAGNOSIS — Z98.1 S/P CERVICAL SPINAL FUSION: ICD-10-CM

## 2024-04-03 PROCEDURE — 97110 THERAPEUTIC EXERCISES: CPT | Mod: GP | Performed by: PHYSICAL THERAPIST

## 2024-04-03 PROCEDURE — 97161 PT EVAL LOW COMPLEX 20 MIN: CPT | Mod: GP | Performed by: PHYSICAL THERAPIST

## 2024-04-03 PROCEDURE — 97112 NEUROMUSCULAR REEDUCATION: CPT | Mod: GP | Performed by: PHYSICAL THERAPIST

## 2024-04-03 NOTE — PROGRESS NOTES
PHYSICAL THERAPY EVALUATION  Type of Visit: Evaluation    See electronic medical record for Abuse and Falls Screening details.    Subjective       Presenting condition or subjective complaint: Rehab neck surgery  Date of onset: 02/12/24    Relevant medical history:     Dates & types of surgery: Back 2022. Neck 2024    Prior diagnostic imaging/testing results: MRI; CT scan; X-ray     Prior therapy history for the same diagnosis, illness or injury:        Prior Level of Function  Transfers: Independent  Ambulation: Independent  ADL: Independent      Living Environment  Social support: With a significant other or spouse   Type of home: House   Stairs to enter the home: Yes 3 Is there a railing: No   Ramp: No   Stairs inside the home: Yes 12 Is there a railing: Yes   Help at home: None  Equipment owned:       Employment: Not Applicable    Hobbies/Interests:      Patient goals for therapy:      Present symptoms: patient reports pain located in the R neck, tightness in upper back, light headaches (neck, arm, forearm, headache).    Radiation:none  Type of pain is described as aching, burning, itching.  Associated symptoms include: none (parasthesia)  Present since: February 12, 2024 with symptoms improving (improving/unchanging/worsening).  This condition is chronic (new/recurrent/chronic).  Symptoms commenced as a result of: previous spinal stenosis   Condition occurred in the following environment:    Symptoms at onset: neck  Constant symptoms:   Intermittent symptoms: neck    Dizziness/tinnitus/nausea/swallowing:none    (with consideration for bending, sitting/rising, turning, lying/rising, am, as the day progresses, pm, when still, on the move, other )  Symptoms are made worse with the following: as the day goes on, turning head, lying flat   Symptoms are made better with the following: ice, heat, Tylenol    Sleep disturbance: yes, sleeping now in the bed but needs elevation/pillows  Sleeping postures: back  Sleeping  surface: firm  Number of pillows:several       Objective   Posture:   Sitting: poor; Standing: ; Protruded head: yes, significant; Relevant wry neck: demonstrates L SB~10 deg  Posture Correction: better    Neurological:  Motor Deficit:  Myotomes L R   C4 (shoulder elevation) 4+/5 5/5   C5 (shoulder abduction) 4/5 5/5   C6 (elbow flexion) 4+/5 5/5   C7 (elbow extension) 5-/5 5/5   C8 (thumb extension)     T1 (finger add/abd)      Strength (lb)       Sensory Deficit, Reflexes, Dural Signs: intact senstation    AROM: (Major, Moderate, Minimal or Nil loss)  Movement Loss Pasquale Mod Min Nil Pain   Protrusion  x x     Flexion x       Retraction x x      Extension x       Lateral flexion R X to 0       Lateral flexion L  X 20 deg      Rotation R X 20 deg       Rotation L X 20 deg         Repeated movement testing:   (During: produces, abolishes, increases, decreases, no effect, centralizing, peripheralizing; After: better, worse, no better, no worse, no effect, centralized, peripheralized)    Pre-test Symptoms Sitting:    Symptoms During Symptoms After ROM increased ROM decreased No Effect   PRO        Rep PRO        RET        Rep RET        RET EXT        Rep RET EXT        LF - R        Rep LF - R        LF - L        Rep LF - L        ROT - R        Rep ROT - R        ROT - L        Rep ROT - L        FLEX        Rep FLEX          Pre-test Symptoms Lying (if needed):    Symptoms During Symptoms After ROM increased ROM decreased No Effect   RET        Rep RET        RET EXT        Rep RET EXT          Static Tests:   Other Tests: AROM B Shoulder R: Flex=110, L: Flex=95; well healed posterior neck incision    Provisional Classification: other/post surgery  Principle of Management (education/equipment/mechanical therapy/specific principle): general ROM, soft tissue release, posture strengthening.        Assessment & Plan   CLINICAL IMPRESSIONS  Medical Diagnosis: S/P cervical spinal fusion    Treatment Diagnosis:  decreased ROM, impaired posture, decreased strength, decreased function   Impression/Assessment: Patient is a 54 year old female with neck complaints.  The following significant findings have been identified: Pain, Decreased ROM/flexibility, Decreased joint mobility, Decreased strength, Decreased activity tolerance, and Impaired posture. These impairments interfere with their ability to perform self care tasks, work tasks, recreational activities, household chores, and driving  as compared to previous level of function.     Clinical Decision Making (Complexity):  Clinical Presentation: Stable/Uncomplicated  Clinical Presentation Rationale: based on medical and personal factors listed in PT evaluation  Clinical Decision Making (Complexity): Low complexity    PLAN OF CARE  Treatment Interventions:  Interventions: Manual Therapy, Neuromuscular Re-education, Therapeutic Activity, Therapeutic Exercise    Long Term Goals     PT Goal 1  Goal Identifier: posture  Goal Description: will demonstrate good sitting posture with use of lumbar roll 100% of the time  Rationale: to maximize safety and independence with transportation;to maximize safety and independence within the community  Target Date: 06/26/24  PT Goal 2  Goal Identifier: driving  Goal Description: patient will demonstrate adequate range of motion in neck/trunk to safely scan traffic painfree.  Target Date: 06/26/24      Frequency of Treatment: 1 x week  Duration of Treatment: 12 weeks    Recommended Referrals to Other Professionals:   Education Assessment:   Learner/Method: Patient;No Barriers to Learning  Education Comments: established POC, use of lumbar roll, traffic light analogy for symptom management    Risks and benefits of evaluation/treatment have been explained.   Patient/Family/caregiver agrees with Plan of Care.     Evaluation Time:     PT Eval, Low Complexity Minutes (97979): 20       Signing Clinician: Danni Rodriguez PT      [facility]  Rehabilitation Services                                                                                   OUTPATIENT PHYSICAL THERAPY      PLAN OF TREATMENT FOR OUTPATIENT REHABILITATION   Patient's Last Name, First Name, Zayra Stern YOB: 1970   Provider's Name   [facility]   Medical Record No.  [id number]     Onset Date: 02/12/24  Start of Care Date: 04/03/24     Medical Diagnosis:  S/P cervical spinal fusion      PT Treatment Diagnosis:  decreased ROM, impaired posture, decreased strength, decreased function Plan of Treatment  Frequency/Duration: 1 x week/ 12 weeks    Certification date from 04/03/24 to 06/26/24         See note for plan of treatment details and functional goals     Danni Rodriguez, PT                         I CERTIFY THE NEED FOR THESE SERVICES FURNISHED UNDER        THIS PLAN OF TREATMENT AND WHILE UNDER MY CARE     (Physician attestation of this document indicates review and certification of the therapy plan).              Referring Provider:  Philip Wilhelm    Initial Assessment  See Epic Evaluation- Start of Care Date: 04/03/24

## 2024-04-15 ENCOUNTER — THERAPY VISIT (OUTPATIENT)
Dept: PHYSICAL THERAPY | Facility: CLINIC | Age: 54
End: 2024-04-15
Attending: ORTHOPAEDIC SURGERY
Payer: COMMERCIAL

## 2024-04-15 DIAGNOSIS — G25.81 RESTLESS LEG SYNDROME: ICD-10-CM

## 2024-04-15 DIAGNOSIS — Z98.1 S/P CERVICAL SPINAL FUSION: Primary | ICD-10-CM

## 2024-04-15 PROCEDURE — 97110 THERAPEUTIC EXERCISES: CPT | Mod: GP | Performed by: PHYSICAL THERAPIST

## 2024-04-15 RX ORDER — PREGABALIN 150 MG/1
CAPSULE ORAL
Qty: 60 CAPSULE | Refills: 3 | Status: SHIPPED | OUTPATIENT
Start: 2024-04-15 | End: 2024-08-16

## 2024-04-15 NOTE — TELEPHONE ENCOUNTER
I called Zayra as someone had updated her med list documenting that she was taking pregabalin 3 times daily rather than the twice daily as it is prescribed. She says she is only taking it twice daily. [facility] does not show that she is requesting an early fill. She has fills on 3/9, 2/10, 1/9, and 12/7.   After her surgery, she had increased the ropinirole, but got back down to 1 tablet daily.    I sent in a refill for another 4 months of pregabalin 150 mg in the morning and afternoon.  Bennett Goltz, PA-C

## 2024-04-16 ENCOUNTER — MYC MEDICAL ADVICE (OUTPATIENT)
Dept: ORTHOPEDICS | Facility: CLINIC | Age: 54
End: 2024-04-16
Payer: COMMERCIAL

## 2024-04-16 NOTE — LETTER
Return to Work  2024         Patient:  Zayra Ramirez  :   1970  MRN:     [id number]  Physician: KARTHIKEYAN GOLDMAN    Zayra Ramirez Has been off work since Spine Surgery date 24 & may not return to work until after reevaluated at next postop appt.        The next clinic appointment is scheduled for 24.  Thanks for your consideration.                Electronically signed by Karthikeyan Goldman MD

## 2024-04-16 NOTE — TELEPHONE ENCOUNTER
See My chart message from pt requesting letter for work.  Just seen in clinic 4-2-24-see dictation for postop prob.   DOS 2-12-24 CS Fusion.    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  Has more PT appts scheduled.    Completed new work letter unable to work & RTC POP appt. 5-17-24 for reeval.    Postal mailed to pt per pt request.    Call back prn.  Pt agreed.    S.O./Rosy Hunter RN. .

## 2024-04-16 NOTE — LETTER
Return to Work  2024         Patient:  Zayra Ramirez  :   1970  MRN:     [id number]  Physician: KARTHIKEYAN GOLDMAN    Zayra Ramirez may not return to work until after reevaluated at next postop appt. .      The next clinic appointment is scheduled for 24.  Thanks for your Consideration.      Electronically signed by Karthikeyan Goldman MD

## 2024-04-25 ASSESSMENT — ASTHMA QUESTIONNAIRES
QUESTION_2 LAST FOUR WEEKS HOW OFTEN HAVE YOU HAD SHORTNESS OF BREATH: ONCE OR TWICE A WEEK
ACT_TOTALSCORE: 20
ACT_TOTALSCORE: 20
QUESTION_5 LAST FOUR WEEKS HOW WOULD YOU RATE YOUR ASTHMA CONTROL: WELL CONTROLLED
QUESTION_3 LAST FOUR WEEKS HOW OFTEN DID YOUR ASTHMA SYMPTOMS (WHEEZING, COUGHING, SHORTNESS OF BREATH, CHEST TIGHTNESS OR PAIN) WAKE YOU UP AT NIGHT OR EARLIER THAN USUAL IN THE MORNING: NOT AT ALL
QUESTION_1 LAST FOUR WEEKS HOW MUCH OF THE TIME DID YOUR ASTHMA KEEP YOU FROM GETTING AS MUCH DONE AT WORK, SCHOOL OR AT HOME: A LITTLE OF THE TIME
QUESTION_4 LAST FOUR WEEKS HOW OFTEN HAVE YOU USED YOUR RESCUE INHALER OR NEBULIZER MEDICATION (SUCH AS ALBUTEROL): TWO OR THREE TIMES PER WEEK

## 2024-04-26 ENCOUNTER — THERAPY VISIT (OUTPATIENT)
Dept: PHYSICAL THERAPY | Facility: CLINIC | Age: 54
End: 2024-04-26
Payer: COMMERCIAL

## 2024-04-26 DIAGNOSIS — Z98.1 S/P CERVICAL SPINAL FUSION: Primary | ICD-10-CM

## 2024-04-26 PROCEDURE — 97140 MANUAL THERAPY 1/> REGIONS: CPT | Mod: GP | Performed by: PHYSICAL THERAPIST

## 2024-04-26 PROCEDURE — 97110 THERAPEUTIC EXERCISES: CPT | Mod: GP | Performed by: PHYSICAL THERAPIST

## 2024-05-02 ENCOUNTER — LAB (OUTPATIENT)
Dept: LAB | Facility: CLINIC | Age: 54
End: 2024-05-02
Payer: COMMERCIAL

## 2024-05-02 ENCOUNTER — OFFICE VISIT (OUTPATIENT)
Dept: INTERNAL MEDICINE | Facility: CLINIC | Age: 54
End: 2024-05-02
Payer: COMMERCIAL

## 2024-05-02 VITALS
SYSTOLIC BLOOD PRESSURE: 115 MMHG | OXYGEN SATURATION: 93 % | BODY MASS INDEX: 33.64 KG/M2 | HEART RATE: 76 BPM | DIASTOLIC BLOOD PRESSURE: 78 MMHG | WEIGHT: 188.7 LBS

## 2024-05-02 DIAGNOSIS — Z11.4 SCREENING FOR HIV (HUMAN IMMUNODEFICIENCY VIRUS): ICD-10-CM

## 2024-05-02 DIAGNOSIS — F12.10 CANNABIS ABUSE, DAILY USE: Primary | ICD-10-CM

## 2024-05-02 DIAGNOSIS — Z23 NEED FOR PNEUMOCOCCAL VACCINE: ICD-10-CM

## 2024-05-02 DIAGNOSIS — Z13.29 SCREENING FOR THYROID DISORDER: ICD-10-CM

## 2024-05-02 DIAGNOSIS — Z78.0 POST-MENOPAUSAL: ICD-10-CM

## 2024-05-02 DIAGNOSIS — Z11.59 NEED FOR HEPATITIS C SCREENING TEST: ICD-10-CM

## 2024-05-02 LAB — TSH SERPL DL<=0.005 MIU/L-ACNC: 0.35 UIU/ML (ref 0.3–4.2)

## 2024-05-02 PROCEDURE — 87389 HIV-1 AG W/HIV-1&-2 AB AG IA: CPT | Performed by: NURSE PRACTITIONER

## 2024-05-02 PROCEDURE — 84443 ASSAY THYROID STIM HORMONE: CPT | Performed by: PATHOLOGY

## 2024-05-02 PROCEDURE — 86803 HEPATITIS C AB TEST: CPT | Performed by: NURSE PRACTITIONER

## 2024-05-02 PROCEDURE — 83001 ASSAY OF GONADOTROPIN (FSH): CPT | Performed by: NURSE PRACTITIONER

## 2024-05-02 PROCEDURE — G0009 ADMIN PNEUMOCOCCAL VACCINE: HCPCS | Performed by: NURSE PRACTITIONER

## 2024-05-02 PROCEDURE — 99000 SPECIMEN HANDLING OFFICE-LAB: CPT | Performed by: PATHOLOGY

## 2024-05-02 PROCEDURE — 90677 PCV20 VACCINE IM: CPT | Performed by: NURSE PRACTITIONER

## 2024-05-02 PROCEDURE — 36415 COLL VENOUS BLD VENIPUNCTURE: CPT | Performed by: PATHOLOGY

## 2024-05-02 PROCEDURE — 99215 OFFICE O/P EST HI 40 MIN: CPT | Mod: 25 | Performed by: NURSE PRACTITIONER

## 2024-05-02 SDOH — HEALTH STABILITY: PHYSICAL HEALTH: ON AVERAGE, HOW MANY MINUTES DO YOU ENGAGE IN EXERCISE AT THIS LEVEL?: 30 MIN

## 2024-05-02 SDOH — HEALTH STABILITY: PHYSICAL HEALTH: ON AVERAGE, HOW MANY DAYS PER WEEK DO YOU ENGAGE IN MODERATE TO STRENUOUS EXERCISE (LIKE A BRISK WALK)?: 3 DAYS

## 2024-05-02 ASSESSMENT — SOCIAL DETERMINANTS OF HEALTH (SDOH): HOW OFTEN DO YOU GET TOGETHER WITH FRIENDS OR RELATIVES?: ONCE A WEEK

## 2024-05-02 NOTE — PROGRESS NOTES
Preventive Care Visit  [facility]  NICKO Lynn CNP, Internal Medicine  May 2, 2024      Assessment & Plan       Screening for HIV (human immunodeficiency virus)  - HIV Screening; Future    Need for hepatitis C screening test  - Hepatitis C Screen Reflex to HCV RNA Quant and Genotype; Future    Screening for thyroid disorder  - TSH with free T4 reflex; Future    Post-menopausal  - DX Bone Density; Future  - Follicle stimulating hormone; Future    Need for pneumococcal vaccine  - Pneumococcal 20 Valent Conjugate (Prevnar 20)    Results for orders placed or performed in visit on 05/02/24   HIV Screening     Status: Normal   Result Value Ref Range    HIV Antigen Antibody Combo Nonreactive Nonreactive   Hepatitis C Screen Reflex to HCV RNA Quant and Genotype     Status: Normal   Result Value Ref Range    Hepatitis C Antibody Nonreactive Nonreactive   TSH with free T4 reflex     Status: Normal   Result Value Ref Range    TSH 0.35 0.30 - 4.20 uIU/mL   Follicle stimulating hormone     Status: None   Result Value Ref Range    FSH 58.1 mIU/mL       Counseling  Appropriate preventive services were discussed with this patient, including applicable screening as appropriate for fall prevention, nutrition, physical activity, and cognition.  Checklist reviewing preventive services available has been given to the patient.  Reviewed patient's diet, addressing concerns and/or questions.   She is at risk for lack of exercise and has been provided with information to increase physical activity for the benefit of her well-being.          I spent a total of  40  minutes in the care of this pt during today's office visit. This time includes reviewing the patient's chart and prior history, obtaining a history, performing an examination and evaluation and counseling the patient. This time also includes ordering medications or tests necessary in addition to communication to other  member's of the patient's health care team. Time spent in documentation and care coordination is included.         Subjective   Zayra is a 54 year old, presenting for the following:  Physical (Discuss bone density scan./Thyroid check./Medication review/refill.)        5/2/2024    11:06 AM   Additional Questions   Roomed by MARIELA           Zayra presents for an annual exam.  She is feeling much improved since surgery on her neck and back.     She no longer smokes cigarettes.  She has a hysterectomy but her ovaries were spared.   Her mammo is UTD  Colonoscopy was 2/6/20.  She has a CPAP with O2.       History of Present Illness       Reason for visit:  Annual and bone density    She eats 2-3 servings of fruits and vegetables daily.She consumes 3 sweetened beverage(s) daily.She exercises with enough effort to increase her heart rate 20 to 29 minutes per day.  She exercises with enough effort to increase her heart rate 4 days per week.   She is taking medications regularly.    Patient Active Problem List   Diagnosis     Chronic bilateral low back pain without sciatica     Facial cellulitis     Class 1 obesity with serious comorbidity and body mass index (BMI) of 32.0 to 32.9 in adult     Dental abscess     Hypoxia     Subcutaneous emphysema (H24)     Borderline personality disorder (H)     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)     Esophageal stricture     Obstruction of esophagus     HTN (hypertension)     Interstitial lung disease (H)     Moderate persistent asthma without complication     Onychomycosis     Restless leg syndrome     Seasonal allergies     Stress     Respiratory bronchiolitis interstitial lung disease (H)     Spinal epidural abscess     Lumbar spondylosis     Inflammatory arthritis     Arthralgia of temporomandibular joint     Articular disc disorder of temporomandibular joint     Derangement of temporomandibular joint     Calculus of gallbladder without cholecystitis without obstruction      Displacement of articular disc of temporomandibular joint with reduction     Myofascial pain     Oral lesion     Symptomatic cholelithiasis     Sacro-iliac pain     Degenerative lumbar spinal stenosis     Glucose intolerance     Chronic neck pain     Lumbar radiculopathy, chronic     Cervical radiculopathy     Acute pain of right shoulder     Other osteoporosis without current pathological fracture     Diaphragmatic hernia     Bipolar 2 disorder (H)     Choking sensation     Gastro-esophageal reflux disease with esophagitis     Limited opening of mandible     History of pelvic surgery     Voiding dysfunction     Pelvic floor dysfunction     Urinary hesitancy     Urinary frequency     S/P spinal fusion     Cannabis abuse, daily use       Past Surgical History:   Procedure Laterality Date     CERVICAL FUSION       COLONOSCOPY       COLONOSCOPY N/A 02/06/2020    Procedure: COLONOSCOPY, WITH POLYPECTOMY AND BIOPSY;  Surgeon: Julian Mccullough MD;  Location: [facility]     CYSTOSCOPY       ENT SURGERY       ESOPHAGOSCOPY, GASTROSCOPY, DUODENOSCOPY (EGD), COMBINED N/A 02/06/2020    Procedure: ESOPHAGOGASTRODUODENOSCOPY (EGD);  Surgeon: Julian Mccullough MD;  Location: [facility]     EXCISE LESION INTRAORAL Bilateral 10/03/2018    Procedure: EXCISE LESION INTRAORAL;  Wide Local Excision Of of Left Oral Cavity Ulcer;  Surgeon: Morro Mijares MD;  Location: [facility]     GYN SURGERY       HC DRAIN SKIN ABSCESS SIMPLE/SINGLE  03/16/2012    Procedure:INCISION AND DRAINAGE, ABSCESS, SIMPLE; Surgeon:CHRISTIANO HANCOCK; Location: GI     HEAD & NECK SURGERY       HYSTERECTOMY       HYSTERECTOMY       INCISION AND DRAINAGE ABDOMEN WASHOUT, COMBINED       INJECT EPIDURAL CERVICAL N/A 10/14/2021    Procedure: Cervical 7- Thoracic 1 epidural steroid injection with fluoroscopy;  Surgeon: Tram Florian MD;  Location: [facility]     INJECT EPIDURAL LUMBAR Right 09/15/2020    Procedure: Lumbar5- sacral 1 epidural steroid  injection with fluoroscopy;  Surgeon: Tram Florian MD;  [facility]     INJECT EPIDURAL LUMBAR Right 06/29/2021    Procedure: Lumbar 5 sacral 1 epidural steroid injection with fluoroscopy;  Surgeon: Tram Florian MD;  Location: [facility]     INJECT SACROILIAC JOINT Bilateral 06/16/2020    Procedure: Bilateral sacroiliac joint steroid injection with fluoroscopy;  Surgeon: Tram Florian MD;  [facility]     LAMINECTOMY THORACIC ONE LEVEL N/A 08/19/2019    Procedure: LAMINECTOMY, SPINE, THORACIC, 11-12 and Part of Lumbar 1, DRAINAGE OF EPIDURAL ABCESS, Epidural Drain Placement X 2;  Surgeon: Yadiel Beal MD;  Location: [facility]     OPTICAL TRACKING SYSTEM FUSION POSTERIOR CERVICAL THREE + LEVELS N/A 2/12/2024    Procedure: Posterior instrumented spinal fusion cervical 2-5; laminectomies cervical 3-4; use of local autograft and crushed cancellous allograft.;  Surgeon: Philip Wilhelm MD;  [facility]: [facility]     OPTICAL TRACKING SYSTEM FUSION SPINE POSTERIOR LUMBAR THREE+ LEVELS N/A 11/30/2022    Procedure: Posterior Instrumented Spinal Fusion Thoracic 8 to Sacrum with Bilateral Pelvic Fixation; Transforaminal Lumbar Interbody Fusion with Erickson-Nixon Osteotomy Lumbar 1 to Sacral 1 (5 levels); Use of Infuse Bone Morphogenetic Protein Large Kit and Allograft;  Surgeon: Philip Wilhelm MD;  [facility]: [facility]     ORTHOPEDIC SURGERY       DE PERCUT IMPLNT NEUROELECT,EPIDURAL N/A 08/08/2019    Procedure: TRIAL, SPINAL CORD STIMULATOR WITH BOSTON SCIENTIFIC;  Surgeon: Sipple, Daniel Peter, DO;  Location: [facility];  Service: Pain     RADIO FREQUENCY ABLATION / DESTRUCTION OF SACROILOAC JOINT DORSAL PRIMARY RAMUS Bilateral 12/17/2019    Procedure: Bilateral lumbar radiofrequency ablation with fluoroscopy and intravenous sedation ( Lumbar 2,3,4,5 medial branch nerves for the bilateral lumbar3-4, 4-5 and 5-sacral1 joints.;  Surgeon: Tram Florian MD;  Location:  OR     RADIO FREQUENCY  ABLATION / DESTRUCTION OF SACROILOAC JOINT DORSAL PRIMARY RAMUS Right 11/17/2020    Procedure: Right lumbar medial branch nerve radiofrequency ablation right L2,3,4,5 nerves supplying the right L3-4, L4-5 and L5-S1 facet joints;  Surgeon: Tram Florian MD;  Location: [facility] OR     spinal cord stimulator  08/08/2019     spinal cord stimulator removal  08/13/2019 5/2/2024   General Health   How would you rate your overall physical health? Good   Feel stress (tense, anxious, or unable to sleep) Not at all         5/2/2024   Nutrition   Diet: Other   If other, please elaborate: High protein diet         5/2/2024   Exercise   Days per week of moderate/strenous exercise 3 days   Average minutes spent exercising at this level 30 min         5/2/2024   Social Factors   Frequency of gathering with friends or relatives Once a week   Worry food won't last until get money to buy more No   Food not last or not have enough money for food? No   Do you have housing?  Yes   Are you worried about losing your housing? No   Lack of transportation? No   Unable to get utilities (heat,electricity)? No         5/2/2024   Fall Risk   Fallen 2 or more times in the past year? No   Trouble with walking or balance? No          5/2/2024   Activities of Daily Living- Home Safety   Needs help with the following daily activites None of the above   Safety concerns in the home None of the above         5/2/2024   Dental   Dentist two times every year? Yes         5/2/2024   Hearing Screening   Hearing concerns? None of the above         5/2/2024   Driving Risk Screening   Patient/family members have concerns about driving No         5/2/2024   General Alertness/Fatigue Screening   Have you been more tired than usual lately? No         5/2/2024   Urinary Incontinence Screening   Bothered by leaking urine in past 6 months No         5/2/2024   TB Screening   Were you born outside of the [place]? No           5/2/2024   Substance Use   Alcohol more  than 3/day or more than 7/wk No   Do you have a current opioid prescription? No   How severe/bad is pain from 1 to 10? 2/10   Do you use any other substances recreationally? (!) CANNABIS PRODUCTS     Social History     Tobacco Use     Smoking status: Former     Current packs/day: 0.00     Average packs/day: 1.5 packs/day for 35.0 years (52.5 ttl pk-yrs)     Types: Cigarettes     Start date: 1996     Quit date: 2021     Years since quittin.4     Smokeless tobacco: Former     Quit date: 2021   Vaping Use     Vaping status: Former   Substance Use Topics     Alcohol use: Never     Drug use: Yes     Types: Marijuana     Comment: smoke 2x times a week           2024   LAST FHS-7 RESULTS   1st degree relative breast or ovarian cancer No   Any relative bilateral breast cancer No   Any male have breast cancer No   Any ONE woman have BOTH breast AND ovarian cancer No   Any woman with breast cancer before 50yrs No   2 or more relatives with breast AND/OR ovarian cancer No   2 or more relatives with breast AND/OR bowel cancer No                  2024   One time HIV Screening   Previous HIV test? no Yes        ASCVD Risk   The 10-year ASCVD risk score (Rosetta LUCAS, et al., 2019) is: 1.2%    Values used to calculate the score:      Age: 54 years      Sex: Female      Is Non- : No      Diabetic: No      Tobacco smoker: No      Systolic Blood Pressure: 115 mmHg      Is BP treated: Yes      HDL Cholesterol: 56 mg/dL      Total Cholesterol: 149 mg/dL          Reviewed and updated as needed this visit by Provider                 Current providers sharing in care for this patient include:  Patient Care Team:  Neena Tam APRN CNP as PCP - General (Internal Medicine)  Care, [facility] ([facility]  Sumaya Bustillo PA as Physician Assistant (Physician Assistant)  Julian Mccullough MD as MD (Gastroenterology)  Dany  Panchito Deras MD as Assigned Rheumatology Provider  Leodan Gan MD as Resident (Student in organized health care education/training program)  Trent Rahman MD as MD (Gastroenterology)  Mick Avalos Jr., MD as Resident (Internal Medicine)  Mai Sotomayor PA-C as Physician Assistant (Pediatric Critical Care Medicine)  Power Ojeda MD as MD (Physical Medicine and Rehabilitation)  Philip Wilhelm MD as Assigned Musculoskeletal Provider  Jamaal Dorsey MD as MD (OB/Gyn)  Dior Arroyo PA-C as Assigned Cancer Care Provider  Dorothy Hong MD as Assigned PCP  Inés Melendez [facility] as Pharmacist (Pharmacist)  Goltz, Bennett Ezra, PA-C as Assigned Neuroscience Provider  Danni Dai PA-C as Assigned Surgical Provider    The following health maintenance items are reviewed in Epic and correct as of today:  Health Maintenance   Topic Date Due     URINE DRUG SCREEN  Never done     ANNUAL REVIEW OF HM ORDERS  Never done     ADVANCE CARE PLANNING  Never done     HIV SCREENING  Never done     HEPATITIS C SCREENING  Never done     Pneumococcal Vaccine: Pediatrics (0 to 5 Years) and At-Risk Patients (6 to 64 Years) (3 of 3 - PPSV23 or PCV20) 01/30/2020     ASTHMA ACTION PLAN  01/17/2023     MEDICARE ANNUAL WELLNESS VISIT  06/30/2023     COVID-19 Vaccine (5 - 2023-24 season) 09/01/2023     ASTHMA CONTROL TEST  11/02/2024     COLORECTAL CANCER SCREENING  02/06/2025     LUNG CANCER SCREENING  04/16/2025     MAMMO SCREENING  04/02/2026     GLUCOSE  03/14/2027     LIPID  07/07/2027     DTAP/TDAP/TD IMMUNIZATION (3 - Td or Tdap) 11/16/2030     PHQ-2 (once per calendar year)  Completed     INFLUENZA VACCINE  Completed     ZOSTER IMMUNIZATION  Completed     HEPATITIS B IMMUNIZATION  Completed     IPV IMMUNIZATION  Aged Out     HPV IMMUNIZATION  Aged Out     MENINGITIS IMMUNIZATION  Aged Out     RSV MONOCLONAL ANTIBODY  Aged Out     PAP  Discontinued        Objective    Exam  /78 (BP  "Location: Right arm, Patient Position: Sitting, Cuff Size: Adult Large)   Pulse 76   Wt 85.6 kg (188 lb 11.2 oz)   LMP  (LMP Unknown)   SpO2 93%   BMI 33.64 kg/m     Estimated body mass index is 33.64 kg/m  as calculated from the following:    Height as of 4/2/24: 1.595 m (5' 2.8\").    Weight as of this encounter: 85.6 kg (188 lb 11.2 oz).    Physical Exam  GENERAL: alert and no distress  EYES: Eyes grossly normal to inspection, PERRL and conjunctivae and sclerae normal  HENT: ear canals and TM's normal, mouth without ulcers or lesions  NECK: no adenopathy, no asymmetry, masses, or scars  RESP: lungs clear to auscultation - no rales, rhonchi or wheezes  BREAST: normal without masses, tenderness or nipple discharge and no palpable axillary masses or adenopathy  CV: regular rate and rhythm, normal S1 S2, no S3 or S4, no murmur, click or rub, no peripheral edema  ABDOMEN: soft, nontender, no hepatosplenomegaly, no masses and bowel sounds normal  MS: no gross musculoskeletal defects noted, no edema  SKIN: no suspicious lesions or rashes. Surgical scars on posterior neck and lumbar back.   NEURO: Normal strength and tone, mentation intact and speech normal  PSYCH: mentation appears normal, affect normal/bright  LYMPH: no cervical, supraclavicular, axillary, or inguinal adenopathy         No data to display                       Signed Electronically by: NICKO yLnn CNP    "

## 2024-05-03 ENCOUNTER — THERAPY VISIT (OUTPATIENT)
Dept: PHYSICAL THERAPY | Facility: CLINIC | Age: 54
End: 2024-05-03
Payer: COMMERCIAL

## 2024-05-03 DIAGNOSIS — Z98.1 S/P CERVICAL SPINAL FUSION: Primary | ICD-10-CM

## 2024-05-03 LAB
FSH SERPL IRP2-ACNC: 58.1 MIU/ML
HCV AB SERPL QL IA: NONREACTIVE
HIV 1+2 AB+HIV1 P24 AG SERPL QL IA: NONREACTIVE

## 2024-05-03 PROCEDURE — 97110 THERAPEUTIC EXERCISES: CPT | Mod: GP | Performed by: PHYSICAL THERAPIST

## 2024-05-10 ENCOUNTER — THERAPY VISIT (OUTPATIENT)
Dept: PHYSICAL THERAPY | Facility: CLINIC | Age: 54
End: 2024-05-10
Payer: COMMERCIAL

## 2024-05-10 DIAGNOSIS — Z98.1 S/P CERVICAL SPINAL FUSION: Primary | ICD-10-CM

## 2024-05-10 PROCEDURE — 97530 THERAPEUTIC ACTIVITIES: CPT | Mod: GP | Performed by: PHYSICAL THERAPIST

## 2024-05-10 PROCEDURE — 97110 THERAPEUTIC EXERCISES: CPT | Mod: GP | Performed by: PHYSICAL THERAPIST

## 2024-05-17 ENCOUNTER — ANCILLARY PROCEDURE (OUTPATIENT)
Dept: GENERAL RADIOLOGY | Facility: CLINIC | Age: 54
End: 2024-05-17
Attending: PHYSICIAN ASSISTANT
Payer: COMMERCIAL

## 2024-05-17 ENCOUNTER — OFFICE VISIT (OUTPATIENT)
Dept: ORTHOPEDICS | Facility: CLINIC | Age: 54
End: 2024-05-17
Payer: COMMERCIAL

## 2024-05-17 DIAGNOSIS — Z98.1 S/P CERVICAL SPINAL FUSION: Primary | ICD-10-CM

## 2024-05-17 DIAGNOSIS — Z98.1 S/P CERVICAL SPINAL FUSION: ICD-10-CM

## 2024-05-17 PROCEDURE — 99213 OFFICE O/P EST LOW 20 MIN: CPT | Performed by: PHYSICIAN ASSISTANT

## 2024-05-17 PROCEDURE — 72040 X-RAY EXAM NECK SPINE 2-3 VW: CPT | Performed by: RADIOLOGY

## 2024-05-17 NOTE — PROGRESS NOTES
Spine Surgical Hx:  10/16/2017 - ACDF with plate fixation C3-C5 (2 levels); use of Cornerstone allograft (Dr. Rylan Monique, [facility]).  [Implants: Medtronic Zevo plate].  08/08/2019 - SCS implantation ([place] [facility]); complicated by epidural abscess MSSA culture positive, treated with drainage/laminectomy and IV antibiotics.  Thus, the SCS was removed.  08/19/2019 - Laminectomies T11-L1 (T12-L2) ([facility]  11/30/2022 - PISF T8-pelvis with bilateral stacked pelvic fixation; TLIF-SPO L1-S1 (5 levels); use of Infuse BMP and allograft (Choco/Shavon) for multilevel sponydylosis and stenosis, thoracolumbar junction kyphosis.  [Implants: Medtronic Solera, 5.5 mm CoCr rods x4 (UNID rods x2); Capstone Control PTC cages; SI-Bone Granite screws x 4].  02/12/2024 - PISF C2-C5; laminectomies C3 and C4; use of allograft (Cherybrano) for pseudarthrosis C3-C5; spondylolisthesis C2-3.  [Implants: Medtronic Infinity screw system, 3.5 mm CoCr rods x2].     BMD Hx:  11/24/21 - DEXA spine/hip/wrist.  T-score = -1.1 (Left femoral neck).  Imp: Osteopenia.  12/8/21 - Saw Dr. Guerra (PM&R).  P> RTC 6 wks; had not been seen since.    Reason for Visit:  Chief Complaint   Patient presents with    RECHECK     RETURN SURGICAL SPINE       S>  54 year old female, RHD, now 12 weeks post op s/p C2-C5 PISF and C3/C4 laminectomy.      Notes more mid back pain working with pt and has held off a little bit. Working a little more in the yard, notes occasional numbness on the deltoid and the right hand. Denies any left side. Denies any fever chills or night sweats. Using tylenol for pain control. Using tylenol for pain control. Denies any bowel/bladder changes.    Has hypersensitivity to the back of her neck. Hurts to touch at times.     Oswestry (KATIE) Questionnaire        5/13/2024     2:03 PM   OSWESTRY DISABILITY INDEX   Count 8   Sum 10   Oswestry Score (%) 25 %     S/p PISF T8-pelvis (11/30/22):  KATIE preop          70%  6wk                     40%  3mo                   45%  6mo                   18%  1yr                     24.44%        5/16/2024     8:34 PM   Neck Disability Index (  Padilla H. and Yenifer BAH. 1991. All rights reserved.; used with permission)   SECTION 1 - PAIN INTENSITY 1   SECTION 2 - PERSONAL CARE 1   SECTION 4 - READING 0   SECTION 5 - HEADACHES 0   SECTION 6 - CONCENTRATION 1   SECTION 7 - WORK 1   SECTION 8 - DRIVING 0   SECTION 9 - SLEEPING 0   SECTION 10 - RECREATION 1   Count 9   Sum 5   Raw Score: /50 5.56   Neck Disability Index Score: (%) 11.11 %       NDI preop35.56%  4wk                    NR  12wk                  11%    Visual Analog Pain Scale  Back Pain Scale 0-10: 2  Right leg pain: 0  Left leg pain: 0  Neck Pain Scale 0-10: 2  Right arm pain: 3 (shoulder and hand pain)  Left arm pain: 0    PROMIS-10 Scores  Global Mental Health Score: (P) 15  Global Physical Health Score: (P) 16  PROMIS TOTAL - SUBSCORES: (P) 31    O>   Alert, oriented x 3, cooperative.  Not in CP distress.  LMP  (LMP Unknown)   Surgical incision well-healed, no sign of infection.  Ambulates independently.   Grossly neurologically intact.  5/5 bilateral deltoid, bicep, tricep, . 5/5 bilateral HF KE DF PF. Right shoulder lower than left, limited ability to maintain upright gaze    Imaging:   XR Cervical 5/17/24    No new hardware concerns. Left C3 anterior screw fracture unchanged plate placement. No lucency to posterior instrumentation. No new fractures. C5-6 spondylolisthesis unchanged.     A>  Cervical spine:  1.  12 weeks s/p PISF C2-C5 (2/12/24) for pseudarthrosis s/p ACDF, with ongoing R sided numbness in deltoid and hands  Thoracolumbar spine:  1.  >1 yr s/p PISF T8-pelvis; TLIF-SPO L1-S1 (5 levels) (11/20/2022), doing well, with improved posture and preoperative pain.  2.  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  3.  Suprajacent spondylosis T7-8; asymptomatic.    P>   She is doing well since surgical  intervention. Has pain around her mid back that is muscle based in nature. Recommend increasing restrictions from 20Ib to 40Ib. Okay to twist turn bend. Okay to use NSAIDs. Plan to return to clinic in 3 months with XR Cervical flex/extension. Educated to call if PT continues to worsen mid back pain. She has mild adjacent segment disease at T7-8. May sherylrent getting a thoracic MRI if pain gets worse, for further investigation.     Bishop ASHANTI Kiran PA-C

## 2024-05-17 NOTE — LETTER
5/17/2024         RE: Zayra Ramirez  [address] [place] [address]        Dear Colleague,    Thank you for referring your patient, Zayra Ramirez, to the [facility] [place]. Please see a copy of my visit note below.    Spine Surgical Hx:  10/16/2017 - ACDF with plate fixation C3-C5 (2 levels); use of Cornerstone allograft (Dr. Rylan Monique, [facility]).  [Implants: Medtronic Zevo plate].  08/08/2019 - SCS implantation ([place] [facility]); complicated by epidural abscess MSSA culture positive, treated with drainage/laminectomy and IV antibiotics.  Thus, the SCS was removed.  08/19/2019 - Laminectomies T11-L1 (T12-L2) ([facility]).  11/30/2022 - PISF T8-pelvis with bilateral stacked pelvic fixation; TLIF-SPO L1-S1 (5 levels); use of Infuse BMP and allograft (Choco/Shavon) for multilevel sponydylosis and stenosis, thoracolumbar junction kyphosis.  [Implants: Medtronic Solera, 5.5 mm CoCr rods x4 (UNID rods x2); Capstone Control PTC cages; SI-Bone Granite screws x 4].  02/12/2024 - PISF C2-C5; laminectomies C3 and C4; use of allograft (Cherybrano) for pseudarthrosis C3-C5; spondylolisthesis C2-3.  [Implants: Medtronic Infinity screw system, 3.5 mm CoCr rods x2].     BMD Hx:  11/24/21 - DEXA spine/hip/wrist.  T-score = -1.1 (Left femoral neck).  Imp: Osteopenia.  12/8/21 - Saw Dr. Guerra (PM&R).  P> RTC 6 wks; had not been seen since.    Reason for Visit:  Chief Complaint   Patient presents with    RECHECK     RETURN SURGICAL SPINE       S>  54 year old female, RHD, now 12 weeks post op s/p C2-C5 PISF and C3/C4 laminectomy.      Notes more mid back pain working with pt and has held off a little bit. Working a little more in the yard, notes occasional numbness on the deltoid and the right hand. Denies any left side. Denies any fever chills or night sweats. Using tylenol for pain control. Using tylenol for pain control. Denies any bowel/bladder changes.    Has hypersensitivity  to the back of her neck. Hurts to touch at times.     Oswestry (KATIE) Questionnaire        5/13/2024     2:03 PM   OSWESTRY DISABILITY INDEX   Count 8   Sum 10   Oswestry Score (%) 25 %     S/p PISF T8-pelvis (11/30/22):  KATIE preop          70%  6wk                    40%  3mo                   45%  6mo                   18%  1yr                     24.44%        5/16/2024     8:34 PM   Neck Disability Index (  Padilla BACA. and Yenifer BAH. 1991. All rights reserved.; used with permission)   SECTION 1 - PAIN INTENSITY 1   SECTION 2 - PERSONAL CARE 1   SECTION 4 - READING 0   SECTION 5 - HEADACHES 0   SECTION 6 - CONCENTRATION 1   SECTION 7 - WORK 1   SECTION 8 - DRIVING 0   SECTION 9 - SLEEPING 0   SECTION 10 - RECREATION 1   Count 9   Sum 5   Raw Score: /50 5.56   Neck Disability Index Score: (%) 11.11 %       NDI preop35.56%  4wk                    NR  12wk                  11%    Visual Analog Pain Scale  Back Pain Scale 0-10: 2  Right leg pain: 0  Left leg pain: 0  Neck Pain Scale 0-10: 2  Right arm pain: 3 (shoulder and hand pain)  Left arm pain: 0    PROMIS-10 Scores  Global Mental Health Score: (P) 15  Global Physical Health Score: (P) 16  PROMIS TOTAL - SUBSCORES: (P) 31    O>   Alert, oriented x 3, cooperative.  Not in CP distress.  LMP  (LMP Unknown)   Surgical incision well-healed, no sign of infection.  Ambulates independently.   Grossly neurologically intact.  5/5 bilateral deltoid, bicep, tricep, . 5/5 bilateral HF KE DF PF. Right shoulder lower than left, limited ability to maintain upright gaze    Imaging:   XR Cervical 5/17/24    No new hardware concerns. Left C3 anterior screw fracture unchanged plate placement. No lucency to posterior instrumentation. No new fractures. C5-6 spondylolisthesis unchanged.     A>  Cervical spine:  1.  12 weeks s/p PISF C2-C5 (2/12/24) for pseudarthrosis s/p ACDF, with ongoing R sided numbness in deltoid and hands  Thoracolumbar spine:  1.  >1 yr s/p PISF T8-pelvis;  TLIF-SPO L1-S1 (5 levels) (11/20/2022), doing well, with improved posture and preoperative pain.  2.  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  3.  Suprajacent spondylosis T7-8; asymptomatic.    P>   She is doing well since surgical intervention. Has pain around her mid back that is muscle based in nature. Recommend increasing restrictions from 20Ib to 40Ib. Okay to twist turn bend. Okay to use NSAIDs. Plan to return to clinic in 3 months with XR Cervical flex/extension. Educated to call if PT continues to worsen mid back pain. She has mild adjacent segment disease at T7-8. May warrrent getting a thoracic MRI if pain gets worse, for further investigation.     Bishop ASHANTI Kiran PA-C

## 2024-05-20 ENCOUNTER — VIRTUAL VISIT (OUTPATIENT)
Dept: SURGERY | Facility: CLINIC | Age: 54
End: 2024-05-20
Payer: COMMERCIAL

## 2024-05-20 DIAGNOSIS — E66.09 CLASS 1 OBESITY DUE TO EXCESS CALORIES WITHOUT SERIOUS COMORBIDITY WITH BODY MASS INDEX (BMI) OF 33.0 TO 33.9 IN ADULT: ICD-10-CM

## 2024-05-20 DIAGNOSIS — Z71.3 NUTRITIONAL COUNSELING: ICD-10-CM

## 2024-05-20 DIAGNOSIS — E66.9 OBESITY (BMI 30-39.9): Primary | ICD-10-CM

## 2024-05-20 DIAGNOSIS — E66.811 CLASS 1 OBESITY DUE TO EXCESS CALORIES WITHOUT SERIOUS COMORBIDITY WITH BODY MASS INDEX (BMI) OF 33.0 TO 33.9 IN ADULT: ICD-10-CM

## 2024-05-20 PROCEDURE — 97803 MED NUTRITION INDIV SUBSEQ: CPT | Mod: 95 | Performed by: DIETITIAN, REGISTERED

## 2024-05-20 NOTE — LETTER
5/20/2024         RE: Zayra Ramirez  [address] [place] [address]        Dear Colleague,    Thank you for referring your patient, Zayra Ramirez, to the [facility] [place]. Please see a copy of my visit note below.    Zayra Ramirez is a 54 year old who is being evaluated via a billable video visit.      How would you like to obtain your AVS? MyChart  If the video visit is dropped, the invitation should be resent by: Text to cell phone: [phone]  Will anyone else be joining your video visit? No        Medical  Weight Loss Follow-Up Diet Evaluation  Assessment:  Zayra is presenting today for a follow up weight management nutrition consultation.  This patient has had an initial appointment and was referred by Dr. Hong  for MNT as treatment for Obesity.      Weight loss medication: Saxenda - not taking d/t supply issues  Pt's weight is 190 lbs  Initial weight: 219 lbs  Weight change: 29 lbs, 13.2% weight loss        9/20/2023    10:06 AM   Changes and Difficulties   I have made the following changes to my diet since my last visit: lowered carbs   With regards to my diet, I am still struggling with: enough protein   I have made the following changes to my activity/exercise since my last visit: increased walking   With regards to my activity/exercise, I am still struggling with: walking over a mile atatime     BMI: 33.88  Ideal body weight: 51.9 kg (114 lb 8.1 oz)  Adjusted ideal body weight: 65.4 kg (144 lb 2.9 oz)    Estimated RMR (Alvin-St Jeor equation):   1,440 kcals x 1.2 (sedentary) = 1,725 kcals (for weight maintenance)  Recommended Protein Intake: 60-80 grams of protein/day    Patient Active Problem List:  Patient Active Problem List   Diagnosis     Chronic bilateral low back pain without sciatica     Facial cellulitis     Class 1 obesity with serious comorbidity and body mass index (BMI) of 32.0 to 32.9 in adult     Dental abscess      Hypoxia     Subcutaneous emphysema (H24)     Borderline personality disorder (H)     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)     Esophageal stricture     Obstruction of esophagus     HTN (hypertension)     Interstitial lung disease (H)     Moderate persistent asthma without complication     Onychomycosis     Restless leg syndrome     Seasonal allergies     Stress     Respiratory bronchiolitis interstitial lung disease (H)     Spinal epidural abscess     Lumbar spondylosis     Inflammatory arthritis     Arthralgia of temporomandibular joint     Articular disc disorder of temporomandibular joint     Derangement of temporomandibular joint     Calculus of gallbladder without cholecystitis without obstruction     Displacement of articular disc of temporomandibular joint with reduction     Myofascial pain     Oral lesion     Symptomatic cholelithiasis     Sacro-iliac pain     Degenerative lumbar spinal stenosis     Glucose intolerance     Chronic neck pain     Lumbar radiculopathy, chronic     Cervical radiculopathy     Acute pain of right shoulder     Other osteoporosis without current pathological fracture     Diaphragmatic hernia     Bipolar 2 disorder (H)     Choking sensation     Gastro-esophageal reflux disease with esophagitis     Limited opening of mandible     History of pelvic surgery     Voiding dysfunction     Pelvic floor dysfunction     Urinary hesitancy     Urinary frequency     S/P spinal fusion     Cannabis abuse, daily use     Progress on goals from last visit: patient has been maintaining by limiting sugars and increasing protein. Patient has been increasing more variety of fruits/vegetables. Patient continues to struggle with fluid intake - does use zero sugar flavoring for her water. Patient stated that she has not been able to get the Saxenda at her pharmacy but has been happy to maintain her weight without it.     Goals (3/6/2024):  Maintain 3 protein based meals daily - met  Work on  increased water consumption, aiming for 64-96 oz/day. - in progress  Watch sodium intake for now to be around 2,300 mg daily, until you can check in with the doctor - met    Dietary Recall:  Breakfast: protein shake  Lunch: nuts or eggs  Dinner: meat, vegetable and rice/potato  Typical snacks: fruit  Eating out: 0-1x/week  Beverages:   Zero sugar mountain dew - at 5x/day  Water (zero sugar flavor) - 3-5 (48-80 ounces total) 16 ounce bottles  Exercise:   Physical therapy d/t back surgery  Walking and walking the [employer]  Gardening    Nutrition Diagnosis:    Obesity (NC 3.3) related to overeating and poor lifestyle habits as evidenced by patient's subjective statements and BMI of 32.8 kg/m2.       Intervention:  Food and/or nutrient delivery: protein intake, fruit/vegetable intake, increasing water intake  Nutrition education: provided Intro to [facility] handout to help refresh protein sources  Nutrition counseling: provided support and encouragement    Monitoring/Evaluation:    Goals:  Continue to prioritize protein intake with meals  Work on increased water consumption, aiming for 64-96 oz/day    Patient to follow up PRN with bariatrician and 3 month(s) with DANIEL        Video-Visit Details    Type of service:  Video Visit    Video Start Time (time video started): 1:56 PM    Video End Time (time video stopped): 2:17 PM    Originating Location (pt. Location): Home      Distant Location (provider location):  Off-site    Mode of Communication:  Video Conference via [employer] [place]    Physician has received verbal consent for a Video Visit from the patient? Yes      Evelia Pope RD          Again, thank you for allowing me to participate in the care of your patient.        Sincerely,        Evelia Pope RD

## 2024-05-20 NOTE — PROGRESS NOTES
Zayra Ramirez is a 54 year old who is being evaluated via a billable video visit.      How would you like to obtain your AVS? MyChart  If the video visit is dropped, the invitation should be resent by: Text to cell phone: [phone]  Will anyone else be joining your video visit? No        Medical  Weight Loss Follow-Up Diet Evaluation  Assessment:  Zayra is presenting today for a follow up weight management nutrition consultation.  This patient has had an initial appointment and was referred by Dr. Hong  for MNT as treatment for Obesity.      Weight loss medication: Saxenda - not taking d/t supply issues  Pt's weight is 190 lbs  Initial weight: 219 lbs  Weight change: 29 lbs, 13.2% weight loss        9/20/2023    10:06 AM   Changes and Difficulties   I have made the following changes to my diet since my last visit: lowered carbs   With regards to my diet, I am still struggling with: enough protein   I have made the following changes to my activity/exercise since my last visit: increased walking   With regards to my activity/exercise, I am still struggling with: walking over a mile atatime     BMI: 33.88  Ideal body weight: 51.9 kg (114 lb 8.1 oz)  Adjusted ideal body weight: 65.4 kg (144 lb 2.9 oz)    Estimated RMR (Villalba-St Jeor equation):   1,440 kcals x 1.2 (sedentary) = 1,725 kcals (for weight maintenance)  Recommended Protein Intake: 60-80 grams of protein/day    Patient Active Problem List:  Patient Active Problem List   Diagnosis    Chronic bilateral low back pain without sciatica    Facial cellulitis    Class 1 obesity with serious comorbidity and body mass index (BMI) of 32.0 to 32.9 in adult    Dental abscess    Hypoxia    Subcutaneous emphysema (H24)    Borderline personality disorder (H)    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)    Esophageal stricture    Obstruction of esophagus    HTN (hypertension)    Interstitial lung disease (H)    Moderate persistent asthma without complication     Onychomycosis    Restless leg syndrome    Seasonal allergies    Stress    Respiratory bronchiolitis interstitial lung disease (H)    Spinal epidural abscess    Lumbar spondylosis    Inflammatory arthritis    Arthralgia of temporomandibular joint    Articular disc disorder of temporomandibular joint    Derangement of temporomandibular joint    Calculus of gallbladder without cholecystitis without obstruction    Displacement of articular disc of temporomandibular joint with reduction    Myofascial pain    Oral lesion    Symptomatic cholelithiasis    Sacro-iliac pain    Degenerative lumbar spinal stenosis    Glucose intolerance    Chronic neck pain    Lumbar radiculopathy, chronic    Cervical radiculopathy    Acute pain of right shoulder    Other osteoporosis without current pathological fracture    Diaphragmatic hernia    Bipolar 2 disorder (H)    Choking sensation    Gastro-esophageal reflux disease with esophagitis    Limited opening of mandible    History of pelvic surgery    Voiding dysfunction    Pelvic floor dysfunction    Urinary hesitancy    Urinary frequency    S/P spinal fusion    Cannabis abuse, daily use     Progress on goals from last visit: patient has been maintaining by limiting sugars and increasing protein. Patient has been increasing more variety of fruits/vegetables. Patient continues to struggle with fluid intake - does use zero sugar flavoring for her water. Patient stated that she has not been able to get the Saxenda at her pharmacy but has been happy to maintain her weight without it.     Goals (3/6/2024):  Maintain 3 protein based meals daily - met  Work on increased water consumption, aiming for 64-96 oz/day. - in progress  Watch sodium intake for now to be around 2,300 mg daily, until you can check in with the doctor - met    Dietary Recall:  Breakfast: protein shake  Lunch: nuts or eggs  Dinner: meat, vegetable and rice/potato  Typical snacks: fruit  Eating out: 0-1x/week  Beverages:    Zero sugar mountain dew - at 5x/day  Water (zero sugar flavor) - 3-5 (48-80 ounces total) 16 ounce bottles  Exercise:   Physical therapy d/t back surgery  Walking and walking the dogs  Gardening    Nutrition Diagnosis:    Obesity (NC 3.3) related to overeating and poor lifestyle habits as evidenced by patient's subjective statements and BMI of 32.8 kg/m2.       Intervention:  Food and/or nutrient delivery: protein intake, fruit/vegetable intake, increasing water intake  Nutrition education: provided Intro to [facility] handout to help refresh protein sources  Nutrition counseling: provided support and encouragement    Monitoring/Evaluation:    Goals:  Continue to prioritize protein intake with meals  Work on increased water consumption, aiming for 64-96 oz/day    Patient to follow up PRN with bariatrician and 3 month(s) with DANIEL        Video-Visit Details    Type of service:  Video Visit    Video Start Time (time video started): 1:56 PM    Video End Time (time video stopped): 2:17 PM    Originating Location (pt. Location): Home      Distant Location (provider location):  Off-site    Mode of Communication:  Video Conference via [facility]    Physician has received verbal consent for a Video Visit from the patient? Yes      Evelia Pope RD

## 2024-05-20 NOTE — PATIENT INSTRUCTIONS
Eat Better ? Move More ? Live Well    Eat 3 nutrient-rich meals each day     Don't skip meals--it will cause you to overeat later in the day!     Eating fiber (vegetables/fruits/whole grains) and protein with meals helps you stay full longer     Choose foods with less than 10 grams of sugar and 5 grams of fat per serving to prevent excess calories and weight re-gain   Eat around the same times each day to develop a routine eating schedule    Avoid snacking unless physically hungry.   Planned snacks: 1-2 times per day and no more than 150 calories    Eat protein first    Protein helps with healing, maintaining adequate muscle mass, reducing hunger and optimizing nutritional status    Aim for 6080 grams of protein per day   Fill up on Fiber    Fiber comes from plants--fruits, veggies, whole grains, nuts/seeds and beans    Fiber is low in calories, high in phytonutrients and helps you stay full longer    Aim for 25-35 grams per day by eating fiber with meals and snacks  Eat [id number]    Take 20-30 minutes to eat each meal by taking small bites, chewing foods to applesauce consistency or 20-30 times before you swallow    Eating foods too fast can delay satiety/fullness signals and increase overeating   Slow down your eating by using toddler utensils, putting your fork/spoon down between bites and not watching TV or emailing during meals!   Keep a Journal          Writing down what you eat, how you feel and when you are active helps you identify new changes to work on from week to week          Look for ways to cut 100 calories from your current diet 2-3 times per day  Drink 64 ounces of 0-Calorie drinks between meals    Water    Zero calorie [employer]el  or Vitamin Water      SoBe [place]  Zero Calories    Crystal Light , Sugar-Free Cristobal-Aid , and other sugar-free lemonade or flavored bhardwaj    Keep Caffeine to less than 300mg per day ie: 3-6oz cups coffee     Work up to 45-60 minutes of physical activity most days of  the week    Helps with losing weight and prevent regaining those extra pounds!     Do a combo of cardio (walking/water exercises) and strength training (lifting weights/Vinyasa yoga)    Avoid Mindless Eating    Be present when you eat--take note of the smell, taste and quality of your food    Make a list of alternative activities you could do to prevent eating out of boredom/stress  Go for a walk, call a friend, chew gum, paint your nails, re-organize the garage, etc      LEAN PROTEIN SOURCES    Protein Source Portion Calories Grams of Protein                           Nonfat, plain Greek yogurt    (10 grams sugar or less) 3/4 cup (6 oz)  12-17   Light Yogurt (10 grams sugar or less) 3/4 cup (6 oz)  6-8   Protein Shake 1 shake 110-180 15-30   Skim/1% Milk or lactose-free milk 1 cup ( 8 oz)  8   Plain or light, flavored soymilk 1 cup  7-8   Plain or light, hemp milk 1 cup 110 6   Fat Free or 1% Cottage Cheese 1/2 cup 90 15   Part skim ricotta cheese 1/2 cup 100 14   Part skim or reduced fat cheese slices 1/4cup, 3 dice 65-80 8     Mozzarella String Cheese 1 80 8   Canned tuna, chicken, crab or salmon  (canned in water)  1/2 cup 100 15-20   White fish (broiled, grilled, baked) 3 ounces 100 21   [place]/Tuna (broiled, grilled, baked) 3 ounces 150-180 21   Shrimp, Scallops, Lobster, Crab 3 ounces 100 21   Pork loin, Pork Tenderloin 3 ounces 150 21   Boneless, skinless chicken /turkey breast                          (broiled, grilled, baked) 3 ounces 120 21   Star, Miller, Tarrant, and Venison 3 ounces 120 21   Lean cuts of red meat and pork (sirloin,   round, tenderloin, flank, ground 93%-96%) 3 ounces 170 21   Lean or Extra Lean Ground Turkey 1/2 cup 150 20   90-95% Lean [place] Burger 1 siomara 140-180 21   Low-fat casserole with lean meat 3/4 cup 200 17   Luncheon Meats                                                        (turkey, lean ham, roast beef, chicken) 3 ounces 100 21   Egg (boiled,  poached, scrambled) 1 Egg 60 7   Egg Substitute 1/2 cup 70 10   Nuts (limit to 1 serving per day)  3 Tbsp. 150 7   Nut [place] (peanut, almond)  Limit to 1 serving or less daily 1 Tbsp. 90 4   Soy Burger (varies) 1  10-15   Edamame  1/2 cup ~95 9   Garbanzo, Black, [place] Beans 1/2 cup 110 7   Refried Beans 1/2 cup 100 7   Kidney and Lima beans 1/2 cup 110 7   Tempeh 3 oz 175 18   Vegan crumbles 1/2 cup 100 14   Tofu 1/2 cup 110 14   Chili (beans and extra lean beef or turkey) 1 cup 200 23   Lentil Stew/Soup 1 cup 150 12   Black Bean Soup 1 cup 175 12     Carbohydrates  Carbohydrates fuel your body with glucose (sugar)--the energy your body needs so you can do your daily activities.  Carbohydrates offer an immediate source of energy for your body. They provide the fuel for your muscles and organs, such as your brain.     Types of Carbohydrates     Complex Carbohydrates are higher in fiber and keep you feeling full longer--helping you eat less.   These are found in nearly all plant-based foods and usually take longer for the body to digest.  They are most commonly found in whole-wheat bread, whole-grain pasta, brown rice, starchy vegetables,   and fruits  Refined Carbohydrates require almost NO WORK for digestion and break down into glucose more quickly   than complex carbohydrates. Refined carbohydrates are usually high in calories and low in nutrients and fiber--  eating more of these can lead to weight gain.  Thinking about eliminating carbohydrates???  If you do not eat enough carbohydrates, the following can occur:  Fatigue  Muscle cramps  Poor mental function  Fatigue easily results from deprivation of carbohydrates, which is seen in people who fast, possibly interfering with activities of daily living.      Thinking about eliminating carbohydrates???  If you do not eat enough carbohydrates, the following can occur:  Fatigue  Muscle cramps  Poor mental function  Fatigue easily results from deprivation of  carbohydrates, which is seen in people who fast, possibly interfering with activities of daily living    Carbohydrates are your body's first choice for fuel. If given a choice of several types of foods simultaneously, your body will use the energy from carbohydrates first.    What foods contain carbohydrates?  Choose the following foods containing carbohydrates (the BEST ones to eat):   Fruit-fresh, frozen, canned in their own juices  Whole grains:  Whole-wheat breads  Brown rice  Oatmeal  Whole-grain cereals  Other starchy foods containing a minimum of 3 grams (g) fiber/100 calories  The ingredient label should list whole wheat or whole grain as one of the first ingredients (bulgur, quinoa, buckwheat, millet, spelt, faro, kasha)  Milk or yogurt (a natural source of carbohydrates):  Low-fat milk  Fat-free milk  Yogurt   Beans or legumes     Starchy vegetables, raw or frozen:  Potatoes  Peas  Corn    AVOID or limit the following foods containing carbohydrates:  Refined sugars, such as in:  Candy  Desserts-ice cream, cakes, pies, brownies, frozen yogurt, sherbet/sorbet  Cookies  White flour: bread/pasta/crackers/rice/tortillas  Sugary snacks: sweetened cereal, granola bars, cereal bars, donuts, muffins, bagels  Sugary Drinks:  Fruit Juice, Smoothies  Sports Drinks  Regular Soda    What are typical serving sizes or portions?  The following are some serving and portion sizes for foods containing carbohydrates:  One medium piece of fruit, about 4?5 ounces (oz) (-tennis ball)  1 cup (C) berries or melon    C canned fruit    C juice (100% vegetable)    C starchy vegetables, cooked or chopped  One slice whole-grain bread  ? C brown rice, quinoa, buckwheat, millet, spelt, faro, kasha    C oatmeal (dry)    C bulgur  One small tortilla (less than 6inch diameter)    C wheat germ  1 oz pretzels     C flaked cereal        Calorie-Controlled Sample Meal Plans    Examples of small healthy meals    Breakfast   Omelet made with   cup  to   cup egg substitute or 2 eggs    cup chopped vegetables  1-2 tbsp. of light cheese     cup salsa  Medium banana    1 cup non-fat plain, Greek yogurt mixed with 1 cup berries and 1-2 Tbsp nuts or cereal   -3/4 cup skim or 1% cottage cheese    cup unsweetened whole-grain cereal  1/2 cup of fresh strawberries  Whole-wheat English muffin or mini bagel, 1 scrambled egg and 1 slice Swiss cheese   Small orange  Protein Bar or Shake (15-30 grams protein and 15-25 grams Carbohydrates)    cup cottage cheese, low-fat    cup fresh fruit    11 ounces of Slim Fast Low Carb (only), Ivan's Advantage, EAS Carb Control    Lunch/Dinner  2-3 slices roasted turkey breast  1 tbsp. of fat free mayonnaise  2 slices of  whole-wheat bread, Medium apple  10 baby carrots with 1 tbsp. of low-fat dip     cup water packed tuna or chicken  1 tablespoons of low-fat mayonnaise  1-2 tbsp. dill relish  1 serving of whole-grain crackers  1 cup of strawberries   6 inch turkey sub sandwich with light mayonnaise,   cup cottage cheese                                                                                                                                                      Black bean and low-fat cheese on a whole wheat tortilla with salsa and light sour cream  Grilled chicken sandwich  Tossed salad with light dressing    Baked potato with 3/4 cup of extra lean ground beef, light shredded cheese and salsa  Fresh fruit                                                 Chicken chunks with lettuce and vegetables stuffed in blayne  Steamed broccoli                                                 3 oz boneless/skinless chicken breast  1/2 cup brown rice with stir-fried vegetables    grapefruit  3 ounces of salmon, trout, or tuna  1 cup of steamed asparagus  1 small slice whole grain Italian bread  Broiled white or pink fish  3/4 cup whole wheat pasta with tomatoes  3/4 cup of roasted red peppers  3 oz. of extra lean (93/7) hamburger on a Arnold's  Delphos Thins  Tossed salad with light dressing       Black bean or Tuscan bean soup with grated mozzarella cheese    of a flour tortilla    3 ounces of grilled pork loin with 1 tbsp. of low-sugar barbeque sauce, 1 cup of green beans seasoned with pepper  Small dinner roll or   cup of grapefruit sections    1-2 cups of torn jules    cup of garbanzo beans or diced skinless chicken breast  5-6 cherry tomatoes  1  tbsp. of crumbled feta cheese  1 tbsp. of roasted soy nuts  1 tsp. of olive oil and 2-3 Tbsp. of balsamic or red wine vinegar  Small whole-wheat dinner roll or   cup of cut up pineapple

## 2024-05-21 NOTE — TELEPHONE ENCOUNTER
Group Topic:  Group Psychotherapy    Date: 5/21/2024  Start Time: 10:15 AM  End Time: 11:15 AM  Facilitators: Juan Lezama LCSW  This visit is being performed virtually via Non-integrated Video Visit. Consent to treat includes permission to submit charges to the patient's insurance. It was shared that without being seen and evaluated in person, there is a risk that the information and/or assessment may be incomplete or inaccurate. This video visit may be discontinued by patient or clinician, if it is felt that the videoconferencing connections are not adequate for his situation.   Clinical Location: Home  Cesar's location Home and is physically present in   the [facility] at the time of this visit.    Focus:  The focus of [facility] group was on peoples challenges with feeling like they are never going to get better and are always going to be depressed. The group talked about the importance of focusing on where one has some healthy power and control.   Number in attendance: 10    Pt was an active participant in group and shared that now that they are not feeling any better and is feeling afraid and anxious that they will never get better.  They were attentive to a group discussion about feeling frustrated and scared of the future. Therapist encouraged the patient to focus on areas of their life where they have some healthy power and control.  Therapist and group offered the patient support and feedback   Pt. was engaged and responsive and supportive of others.        Method: Group  Attendance: Present  Participation: Moderate  Patient Response: Appropriate feedback and Attentive  Mood: Anxious and Depressed  Affect: Type: Anxious and Depressed   Range: Blunted/flat   Congruency: Congruent   Stability: Stable  Behavior/Socialization: Cooperative  Thought Process: Focused  Task Performance: Follows directions  Additional Information:  Psychosocial Stressors: Health  Symptom Notations: anxious, depressed,  LVMTCB to clarify prescription question.   engaged.   Patient Evaluation: Encouragement - needs prompts

## 2024-05-24 DIAGNOSIS — G89.18 POSTOPERATIVE PAIN: ICD-10-CM

## 2024-05-27 NOTE — TELEPHONE ENCOUNTER
cyclobenzaprine (FLEXERIL) 10 MG tablet 15 tablet 1 3/14/2024 -- No   Sig - Route: Take 1 tablet (10 mg) by mouth 3 times daily as needed for muscle spasms - Oral     ----------------------  Last Office Visit : 5/2/2024  [facility] [place]     Neena Tam, NICKO ALEXIS     Future Office visit:  0  ----------------------      Routing refill request to provider for review/approval because:  Not on protocol

## 2024-05-28 RX ORDER — CYCLOBENZAPRINE HCL 10 MG
10 TABLET ORAL AT BEDTIME
Qty: 90 TABLET | Refills: 1 | Status: SHIPPED | OUTPATIENT
Start: 2024-05-28

## 2024-06-11 ENCOUNTER — THERAPY VISIT (OUTPATIENT)
Dept: PHYSICAL THERAPY | Facility: CLINIC | Age: 54
End: 2024-06-11
Payer: COMMERCIAL

## 2024-06-11 DIAGNOSIS — Z98.1 S/P CERVICAL SPINAL FUSION: Primary | ICD-10-CM

## 2024-06-11 PROCEDURE — 97530 THERAPEUTIC ACTIVITIES: CPT | Mod: GP | Performed by: PHYSICAL THERAPIST

## 2024-06-11 PROCEDURE — 97110 THERAPEUTIC EXERCISES: CPT | Mod: GP | Performed by: PHYSICAL THERAPIST

## 2024-07-02 ENCOUNTER — THERAPY VISIT (OUTPATIENT)
Dept: PHYSICAL THERAPY | Facility: CLINIC | Age: 54
End: 2024-07-02
Payer: COMMERCIAL

## 2024-07-02 DIAGNOSIS — Z98.1 S/P CERVICAL SPINAL FUSION: Primary | ICD-10-CM

## 2024-07-02 PROCEDURE — 97110 THERAPEUTIC EXERCISES: CPT | Mod: GP | Performed by: PHYSICAL THERAPIST

## 2024-07-02 NOTE — PROGRESS NOTES
[facility]                                                                                   OUTPATIENT PHYSICAL THERAPY    PLAN OF TREATMENT FOR OUTPATIENT REHABILITATION   Patient's Last Name, First Name, Zayra Stern YOB: 1970   Provider's Name   [facility]   Medical Record No.  [id number]     Onset Date: 02/12/24  Start of Care Date: 04/03/24     Medical Diagnosis:  S/P cervical spinal fusion      PT Treatment Diagnosis:  decreased ROM, impaired posture, decreased strength, decreased function Plan of Treatment  Frequency/Duration: 1 x week/ 12 weeks    Certification date from 06/27/24 to 08/01/24         See note for plan of treatment details and functional goals     Danni Rodriguez, PT                         I CERTIFY THE NEED FOR THESE SERVICES FURNISHED UNDER        THIS PLAN OF TREATMENT AND WHILE UNDER MY CARE     (Physician attestation of this document indicates review and certification of the therapy plan).              Referring Provider:  Philip Wilhelm    Initial Assessment  See Epic Evaluation- Start of Care Date: 04/03/24            PLAN  Continue therapy per current plan of care.    Beginning/End Dates of Progress Note Reporting Period:    to 07/02/2024    Referring Provider:  Philip Wilhelm     07/02/24 0500   Appointment Info   Signing clinician's name / credentials Danni Rodriguez PT Cert MDT   Total/Authorized Visits 12   Visits Used 7   Medical Diagnosis S/P cervical spinal fusion   PT Tx Diagnosis decreased ROM, impaired posture, decreased strength, decreased function   Quick Adds Certification   Progress Note/Certification   Start of Care Date 04/03/24   Onset of illness/injury or Date of Surgery 02/12/24   Therapy Frequency 1 x week   Predicted Duration 12 weeks   Certification date from 06/27/24   Certification date to 08/01/24   Progress Note Due Date 05/17/24   GOALS   PT Goals 2   PT  Goal 1   Goal Identifier posture   Goal Description will demonstrate good sitting posture with use of lumbar roll 100% of the time   Rationale to maximize safety and independence with transportation;to maximize safety and independence within the community   Goal Progress had been able to do some walking with dogs with poor posture;   Target Date 08/01/24   PT Goal 2   Goal Identifier driving   Goal Description patient will demonstrate adequate range of motion in neck/trunk to safely scan traffic painfree.   Goal Progress able to scan traffic; having more trouble in mid back twisting so uses low back   Target Date 06/26/24   Date Met 06/11/24   Subjective Report   Subjective Report neck is better but having increasing mid back pain R sided; this limits her standing posture; worse after lying down; sleeping elevated;   Objective Measures   Objective Measures Objective Measure 1;Objective Measure 2;Objective Measure 3;Objective Measure 4   Objective Measure 1   Objective Measure Cx ROM   Details R Rot=40, L Rot=55 Ext=45, R SB=20, L SB=35   Objective Measure 2   Objective Measure soft tissue tightness/TP   Details present in L shoulder teres minor, infraspinatus, Lats, UT   Objective Measure 3   Objective Measure posture   Details able to achieve neutral without pain today   Objective Measure 4   Objective Measure THX ROM   Details R Rot=min mod loss erp, L Rot=mod loss EXT=mod to major loss P familiar pain   Treatment Interventions (PT)   Interventions Neuromuscular Re-education;Therapeutic Procedure/Exercise;Manual Therapy;Therapeutic Activity   Therapeutic Procedure/Exercise   Therapeutic Procedures Ther Proc 2;Ther Proc 3;Ther Proc 4;Ther Proc 5;Ther Proc 6   Ther Proc 1 NuStep   Ther Proc 1 - Details L4, SH8   Ther Proc 2 counter stretch   Ther Proc 2 - Details x 10   Ther Proc 3 row   Ther Proc 3 - Details red tband x 15   Ther Proc 4 counter push ups   Ther Proc 4 - Details x 10 vc for head position   Ther Proc  5 education   Ther Proc 5 - Details reviewing HEP today and holding exercises for next few days to see repsonse   Ther Proc 6 THX ROM   Ther Proc 6 - Details 3 x 10 P/NW/incr ROM   PTRx Ther Proc 1 Neutral Spine Slouch Overcorrect   PTRx Ther Proc 1 - Details mc for head position x 10    PTRx Ther Proc 2 Supine Cervical Retraction   PTRx Ther Proc 2 - Details mc for techniques vc for end range; 2 x 10;    PTRx Ther Proc 3 Cervical Isometric Side Bending   PTRx Ther Proc 3 - Details x 5/hold 5 sec both directions; vc to retract some while performing   PTRx Ther Proc 4 Cervical ROM Side Bending   PTRx Ther Proc 4 - Details x 10   PTRx Ther Proc 5 Cervical ROM Rotation   PTRx Ther Proc 5 - Details x 10   PTRx Ther Proc 6 Counter Stretch   PTRx Ther Proc 6 - Details x 10 R arm only   PTRx Ther Proc 7 Shoulder Flexion Stretch with Stick   PTRx Ther Proc 7 - Details x 10 vc for exhale on reach w/rib depression   PTRx Ther Proc 8 Supine Shoulder External Rotation   PTRx Ther Proc 8 - Details yellow tband x 20   PTRx Ther Proc 9 Shoulder Theraband Low Row/Pulldown   PTRx Ther Proc 9 - Details red tband x 20   PTRx Ther Proc 10 Scapular Retraction/Depression   PTRx Ther Proc 10 - Details No Notes   PTRx Ther Proc 11 Hooklying Lumbar Rotation   PTRx Ther Proc 11 - Details x 10/side   Skilled Intervention to restore normal spinal mechanics   Patient Response/Progress adalid well   PTRx Ther Proc 12 Ball Stabilization Prone Single Leg Extension   PTRx Ther Proc 12 - Details x 10/side   PTRx Ther Proc 13 Ball Stabilization Prone Alternating Arm Extension   PTRx Ther Proc 13 - Details x 10 /side   PTRx Ther Proc 14 Seated Long Arc Quad on Ball   PTRx Ther Proc 14 - Details supported SBA x 10/side   PTRx Ther Proc 15 Ball Exercise Seated Marching   PTRx Ther Proc 15 - Details arm reach heel lift x 10/side   Therapeutic Activity   Therapeutic Activities Ther Act 2   Ther Act 1 education   Ther Act 1 - Details discussed HEP and holding  exercises to assess effect on stiffness.   Ther Act 2 sleeping posture   Ther Act 2 - Details discussed sleeping less upright, more lying down as able with exercise   PTRx Ther Act 1 Posture Correction with Lumbar Roll   PTRx Ther Act 1 - Details No Notes   Neuromuscular Re-education   Neuro Re-ed 1 education in posture   Neuro Re-ed 1 - Details established POC, use of lumbar roll, traffic light analogy for symptom management   PTRx Neuro Re-ed 1 Shoulder Theraband Rows   PTRx Neuro Re-ed 1 - Details red tband x 20walkouts x 30side pulls x 30   Skilled Intervention neutral spine to decrease pain   Patient Response/Progress adalid well   Manual Therapy   Manual Therapy 1 TP release   Manual Therapy 1 - Details L infraspinatus, teres minor, UT   Education   Learner/Method Patient;No Barriers to Learning   Education Comments established POC, use of lumbar roll, traffic light analogy for symptom management   Plan   Home program PTRX, POC

## 2024-07-24 DIAGNOSIS — Z98.1 S/P CERVICAL SPINAL FUSION: Primary | ICD-10-CM

## 2024-08-14 ENCOUNTER — MYC REFILL (OUTPATIENT)
Dept: INTERNAL MEDICINE | Facility: CLINIC | Age: 54
End: 2024-08-14
Payer: COMMERCIAL

## 2024-08-14 DIAGNOSIS — G25.81 RESTLESS LEG SYNDROME: ICD-10-CM

## 2024-08-15 ENCOUNTER — MYC MEDICAL ADVICE (OUTPATIENT)
Dept: SLEEP MEDICINE | Facility: CLINIC | Age: 54
End: 2024-08-15
Payer: COMMERCIAL

## 2024-08-15 DIAGNOSIS — G25.81 RESTLESS LEG SYNDROME: ICD-10-CM

## 2024-08-16 ENCOUNTER — OFFICE VISIT (OUTPATIENT)
Dept: ORTHOPEDICS | Facility: CLINIC | Age: 54
End: 2024-08-16
Payer: COMMERCIAL

## 2024-08-16 ENCOUNTER — ANCILLARY PROCEDURE (OUTPATIENT)
Dept: GENERAL RADIOLOGY | Facility: CLINIC | Age: 54
End: 2024-08-16
Attending: PHYSICIAN ASSISTANT
Payer: COMMERCIAL

## 2024-08-16 ENCOUNTER — TELEPHONE (OUTPATIENT)
Dept: SLEEP MEDICINE | Facility: CLINIC | Age: 54
End: 2024-08-16

## 2024-08-16 VITALS — HEIGHT: 63 IN | WEIGHT: 183 LBS | BODY MASS INDEX: 32.43 KG/M2

## 2024-08-16 DIAGNOSIS — Z98.1 S/P CERVICAL SPINAL FUSION: ICD-10-CM

## 2024-08-16 DIAGNOSIS — Z98.1 S/P CERVICAL SPINAL FUSION: Primary | ICD-10-CM

## 2024-08-16 PROCEDURE — 72040 X-RAY EXAM NECK SPINE 2-3 VW: CPT | Performed by: STUDENT IN AN ORGANIZED HEALTH CARE EDUCATION/TRAINING PROGRAM

## 2024-08-16 PROCEDURE — 99214 OFFICE O/P EST MOD 30 MIN: CPT | Performed by: PHYSICIAN ASSISTANT

## 2024-08-16 RX ORDER — PREGABALIN 150 MG/1
CAPSULE ORAL
Qty: 60 CAPSULE | Refills: 3 | Status: SHIPPED | OUTPATIENT
Start: 2024-08-16

## 2024-08-16 RX ORDER — PREDNISONE 5 MG/1
5 TABLET ORAL DAILY
COMMUNITY
Start: 2024-08-12

## 2024-08-16 NOTE — TELEPHONE ENCOUNTER
Pending Prescriptions:                       Disp   Refills    pregabalin (LYRICA) 150 MG capsule        60 cap*3            Sig: Take 1 capsule by mouth in the morning and around           1 PM          Last Written Prescription Date:  4/15/24  Last Fill Quantity: 60,   # refills: 3  Last Office Visit: 10/23/23  Future Office visit:  10/16/24      Routing refill request to provider for review/approval because:  Drug not on the G, P or [facility] refill protocol or controlled substance

## 2024-08-16 NOTE — TELEPHONE ENCOUNTER
Medication Question or Refill        What medication are you calling about (include dose and sig)?: Preg[place]lin    Preferred Pharmacy:   [id number] - [place], [place] - [address]  [address] [address]  Phone: [phone] Fax: [phone]      Controlled Substance Agreement on file:   CSA -- Patient Level:    CSA: None found at the patient level.       Who prescribed the medication?: [facility]    Do you need a refill? Yes    When did you use the medication last? currently    Patient offered an appointment? Yes:     Do you have any questions or concerns?  Yes: patient is out of medication after today.    Please contact patient. Thank you.      Could we send this information to you in [employer] or would you prefer to receive a phone call?:   Patient would prefer a phone call   Okay to leave a detailed message?: Yes at Cell number on file:    Telephone Information:   Mobile [phone]

## 2024-08-16 NOTE — LETTER
8/16/2024      Zayra J James  [address] [place] [address]      Dear Colleague,    Thank you for referring your patient, Zayra Ramirez, to the [facility] [place]. Please see a copy of my visit note below.    Spine Surgical Hx:  10/16/2017 - ACDF with plate fixation C3-C5 (2 levels); use of Cornerstone allograft (Dr. Rylan Monique, [facility]).  [Implants: Medtronic Zevo plate].  08/08/2019 - SCS implantation ([place] OR); complicated by epidural abscess MSSA culture positive, treated with drainage/laminectomy and IV antibiotics.  Thus, the SCS was removed.  08/19/2019 - Laminectomies T11-L1 (T12-L2) ([facility]).  11/30/2022 - PISF T8-pelvis with bilateral stacked pelvic fixation; TLIF-SPO L1-S1 (5 levels); use of Infuse BMP and allograft (Choco/Shavon) for multilevel sponydylosis and stenosis, thoracolumbar junction kyphosis.  [Implants: Medtronic Solera, 5.5 mm CoCr rods x4 (UNID rods x2); Capstone Control PTC cages; SI-Bone Granite screws x 4].  02/12/2024 - PISF C2-C5; laminectomies C3 and C4; use of allograft (Cherybrano) for pseudarthrosis C3-C5; spondylolisthesis C2-3.  [Implants: Medtronic Infinity screw system, 3.5 mm CoCr rods x2].     BMD Hx:  11/24/21 - DEXA spine/hip/wrist.  T-score = -1.1 (Left femoral neck).  Imp: Osteopenia.  12/8/21 - Saw Dr. Guerra (PM[facility]R).  P> RTC 6 wks; had not been seen since.    Reason for Visit:  Chief Complaint   Patient presents with     RECHECK     RETURN SURGICAL SPINE - 3 month f/u from 5/17 - DOS: 2/12/24        S>  54 year old female, RHD, now 6 months post op s/p C2-C5 PISF and C3/C4 laminectomy     She was evaluated for her shoulders a few years ago. Was found to have RA. Methotrexate for her RA helped with her shoulder pain. Stopped taking  due to fusions, and has been taking hydroxychloriquine. Has an upcoming evaluation with the rheumatologist doctor. Limted range of motion with left shoulder. Notes the right fingers  "feels numb. [place] a lot and has to work with the right shoulder. Notes that the right hand numbness resolved for a few months and started coming back over the last month and half. Improving with balance, working on her core. Has had a lot of family things and not able to participate in PT. Has been doing the wall/core training at home. Denies any trouble going to the bathroom. Denies any falls.     Oswestry (KATIE) Questionnaire        8/11/2024     8:58 AM   OSWESTRY DISABILITY INDEX   Count 8   Sum 12   Oswestry Score (%) 30 %      S/p PISF T8-pelvis (11/30/22):  KATIE preop          70%  6wk                    40%  3mo                   45%  6mo                   18%  1yr                     24.44%           8/11/2024     9:07 AM   Neck Disability Index (  Padilla BACA. and [employer]enifer BAH. 1991. All rights reserved.; used with permission)   SECTION 1 - PAIN INTENSITY 2   SECTION 2 - PERSONAL CARE 1   SECTION 5 - HEADACHES 0   SECTION 6 - CONCENTRATION 2   SECTION 7 - WORK 3   SECTION 8 - DRIVING 1   SECTION 9 - SLEEPING 2   Count 7   Sum 11     NDI preop  35.56%  4wk                    NR  12wk                  11%  6mo                   11%    Visual Analog Pain Scale  Back Pain Scale 0-10: 0  Right leg pain: 3 (knee pain)  Left leg pain: 0  Neck Pain Scale 0-10: 2 (right sisde neck pain)  Right arm pain: 0  Left arm pain: 0    PROMIS-10 Scores  Global Mental Health Score: (P) 12  Global Physical Health Score: (P) 15  PROMIS TOTAL - SUBSCORES: (P) 27    O>   Alert, oriented x 3, cooperative.  Not in CP distress.  Ht 1.595 m (5' 2.8\")   Wt 83 kg (183 lb)   LMP  (LMP Unknown)   BMI 32.62 kg/m    Surgical incision well-healed, no sign of infection.  Ambulates independently.   Grossly neurologically intact.  Positive Ware's, empty can on left shoulder    Imaging:   XR Cervical flex/extension 8/16/2024     No changes with flex extension.no new hardware concerns.    CT thoracic 11/30/2023    Evidence of adjacent segment " disease above her previous thoracolumbar fusion with signs of degenerative disc disease and vacuum phenomenon at T7-T8.    MR cervical 3/28/2024    No cord or foraminal compromise at C2-3.  Right C5-6 mild foraminal stenosis.  C6-7 mild spondylolisthesis without foraminal or central canal stenosis  A>  Cervical spine:  1.  12 weeks s/p PISF C2-C5 (2/12/24) for pseudarthrosis s/p ACDF, with ongoing R sided numbness in deltoid and hands  Thoracolumbar spine:  1.  >1 yr s/p PISF T8-pelvis; TLIF-SPO L1-S1 (5 levels) (11/20/2022), doing well, with improved posture and preoperative pain.  2.  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  3.  Suprajacent spondylosis T7-8; asymptomatic.   4.  History of rheumatoid arthritis  5.  Bilateral left greater than right shoulder pain    P>   She has shoulder pain that is worse on the left versus the right.  She has limited range of motion to her left shoulder.  Not able to raise her hand above 90 degrees on the left shoulder.  She notes pain on palpation of the left shoulder.  She has positive empty can and Burrell on the left side.  Her right side bothers her, but has no limited exam findings.  Plan to be evaluated by sports medicine.  Potential to get relief from an intra-articular cortisone joint injection.  She also has a rheumatology appointment to start her methotrexate.  She is doing well regarding her cervical fusion.  I see progression of bony formation.  I do not see much motion on flexion-extension.  Will plan on getting a CT scan in 6 months to review fusion.  Her last DEXA scan in 2021 showed evidence of osteopenia.  She is currently utilizing vitamin D and calcium supplementation.  Educated that she should repeat her DEXA scan to rule out osteoporosis.  She will reach out to her PCP Neena ALEXIS.    - RTC in 6 months  - CT Cervical  - Sports medicine for left shoulder pain    Bishop ASHANTI Kiran PA-C        Again, thank you for allowing me to  participate in the care of your patient.        Sincerely,        Bishop ASHANTI Kiran PA-C

## 2024-08-16 NOTE — PROGRESS NOTES
Spine Surgical Hx:  10/16/2017 - ACDF with plate fixation C3-C5 (2 levels); use of Cornerstone allograft (Dr. Rylan Monique, [facility]).  [Implants: Medtronic Zevo plate].  08/08/2019 - SCS implantation ([place] OR); complicated by epidural abscess MSSA culture positive, treated with drainage/laminectomy and IV antibiotics.  Thus, the SCS was removed.  08/19/2019 - Laminectomies T11-L1 (T12-L2) ([facility]  11/30/2022 - PISF T8-pelvis with bilateral stacked pelvic fixation; TLIF-SPO L1-S1 (5 levels); use of Infuse BMP and allograft (Choco/Shavon) for multilevel sponydylosis and stenosis, thoracolumbar junction kyphosis.  [Implants: Medtronic Solera, 5.5 mm CoCr rods x4 (UNID rods x2); Capstone Control PTC cages; SI-Bone Granite screws x 4].  02/12/2024 - PISF C2-C5; laminectomies C3 and C4; use of allograft (Cherybrano) for pseudarthrosis C3-C5; spondylolisthesis C2-3.  [Implants: Medtronic Infinity screw system, 3.5 mm CoCr rods x2].     BMD Hx:  11/24/21 - DEXA spine/hip/wrist.  T-score = -1.1 (Left femoral neck).  Imp: Osteopenia.  12/8/21 - Saw Dr. Guerra ([facility]).  P> RTC 6 wks; had not been seen since.    Reason for Visit:  Chief Complaint   Patient presents with    RECHECK     RETURN SURGICAL SPINE - 3 month f/u from 5/17 - DOS: 2/12/24        S>  54 year old female, RHD, now 6 months post op s/p C2-C5 PISF and C3/C4 laminectomy     She was evaluated for her shoulders a few years ago. Was found to have RA. Methotrexate for her RA helped with her shoulder pain. Stopped taking  due to fusions, and has been taking hydroxychloriquine. Has an upcoming evaluation with the rheumatologist doctor. Limted range of motion with left shoulder. Notes the right fingers feels numb. Gardens a lot and has to work with the right shoulder. Notes that the right hand numbness resolved for a few months and started coming back over the last month and half. Improving with balance, working on her core. Has had a lot of family things  "and not able to participate in PT. Has been doing the wall/core training at home. Denies any trouble going to the bathroom. Denies any falls.     Oswestry (KATIE) Questionnaire        8/11/2024     8:58 AM   OSWESTRY DISABILITY INDEX   Count 8   Sum 12   Oswestry Score (%) 30 %      S/p PISF T8-pelvis (11/30/22):  KATIE preop          70%  6wk                    40%  3mo                   45%  6mo                   18%  1yr                     24.44%           8/11/2024     9:07 AM   Neck Disability Index (  Padilla BACA. and Yenifer BAH. 1991. All rights reserved.; used with permission)   SECTION 1 - PAIN INTENSITY 2   SECTION 2 - PERSONAL CARE 1   SECTION 5 - HEADACHES 0   SECTION 6 - CONCENTRATION 2   SECTION 7 - WORK 3   SECTION 8 - DRIVING 1   SECTION 9 - SLEEPING 2   Count 7   Sum 11     NDI preop  35.56%  4wk                    NR  12wk                  11%  6mo                   11%    Visual Analog Pain Scale  Back Pain Scale 0-10: 0  Right leg pain: 3 (knee pain)  Left leg pain: 0  Neck Pain Scale 0-10: 2 (right sisde neck pain)  Right arm pain: 0  Left arm pain: 0    PROMIS-10 Scores  Global Mental Health Score: (P) 12  Global Physical Health Score: (P) 15  PROMIS TOTAL - SUBSCORES: (P) 27    O>   Alert, oriented x 3, cooperative.  Not in CP distress.  Ht 1.595 m (5' 2.8\")   Wt 83 kg (183 lb)   LMP  (LMP Unknown)   BMI 32.62 kg/m    Surgical incision well-healed, no sign of infection.  Ambulates independently.   Grossly neurologically intact.  Positive Lake's, empty can on left shoulder    Imaging:   XR Cervical flex/extension 8/16/2024     No changes with flex extension.no new hardware concerns.    CT thoracic 11/30/2023    Evidence of adjacent segment disease above her previous thoracolumbar fusion with signs of degenerative disc disease and vacuum phenomenon at T7-T8.    MR cervical 3/28/2024    No cord or foraminal compromise at C2-3.  Right C5-6 mild foraminal stenosis.  C6-7 mild spondylolisthesis " without foraminal or central canal stenosis  A>  Cervical spine:  1.  12 weeks s/p PISF C2-C5 (2/12/24) for pseudarthrosis s/p ACDF, with ongoing R sided numbness in deltoid and hands  Thoracolumbar spine:  1.  >1 yr s/p PISF T8-pelvis; TLIF-SPO L1-S1 (5 levels) (11/20/2022), doing well, with improved posture and preoperative pain.  2.  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  3.  Suprajacent spondylosis T7-8; asymptomatic.   4.  History of rheumatoid arthritis  5.  Bilateral left greater than right shoulder pain    P>   She has shoulder pain that is worse on the left versus the right.  She has limited range of motion to her left shoulder.  Not able to raise her hand above 90 degrees on the left shoulder.  She notes pain on palpation of the left shoulder.  She has positive empty can and Burrell on the left side.  Her right side bothers her, but has no limited exam findings.  Plan to be evaluated by sports medicine.  Potential to get relief from an intra-articular cortisone joint injection.  She also has a rheumatology appointment to start her methotrexate.  She is doing well regarding her cervical fusion.  I see progression of bony formation.  I do not see much motion on flexion-extension.  Will plan on getting a CT scan in 6 months to review fusion.  Her last DEXA scan in 2021 showed evidence of osteopenia.  She is currently utilizing vitamin D and calcium supplementation.  Educated that she should repeat her DEXA scan to rule out osteoporosis.  She will reach out to her PCP Neena OWEN    - RTC in 6 months  - CT Cervical  - Sports medicine for left shoulder pain    Bishop ASHANTI Kiran PA-C

## 2024-08-19 RX ORDER — PREGABALIN 150 MG/1
CAPSULE ORAL
Qty: 60 CAPSULE | Refills: 5 | Status: SHIPPED | OUTPATIENT
Start: 2024-08-19

## 2024-08-20 NOTE — PROGRESS NOTES
Zayra Ramirez is a 54 year old who is being evaluated via a billable video visit.      How would you like to obtain your AVS? MyChart  If the video visit is dropped, the invitation should be resent by: Text to cell phone: [phone]  Will anyone else be joining your video visit? No        Medical  Weight Loss Follow-Up Diet Evaluation  Assessment:  Zayra is presenting today for a follow up weight management nutrition consultation.  This patient has had an initial appointment and was referred by Dr. Hong  for MNT as treatment for Obesity.   Weight loss medication:  none at this time .   Pt's weight is 187 lbs  Initial weight: 219 lbs  Weight change: 32 lbs, 14.6% weight loss        9/20/2023    10:06 AM   Changes and Difficulties   I have made the following changes to my diet since my last visit: lowered carbs   With regards to my diet, I am still struggling with: enough protein   I have made the following changes to my activity/exercise since my last visit: increased walking   With regards to my activity/exercise, I am still struggling with: walking over a mile atatime     BMI: 33.34  Ideal body weight: 51.9 kg (114 lb 8.1 oz)  Adjusted ideal body weight: 64.4 kg (141 lb 14.5 oz)    Estimated RMR ([place]-St Jeor equation):   1,440 kcals x 1.2 (sedentary) = 1,725 kcals (for weight maintenance)  Recommended Protein Intake: 60-80 grams of protein/day  Patient Active Problem List:  Patient Active Problem List   Diagnosis    Chronic bilateral low back pain without sciatica    Facial cellulitis    Class 1 obesity with serious comorbidity and body mass index (BMI) of 32.0 to 32.9 in adult    Dental abscess    Hypoxia    Subcutaneous emphysema (H24)    Borderline personality disorder (H)    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)    Esophageal stricture    Obstruction of esophagus    HTN (hypertension)    Interstitial lung disease (H)    Moderate persistent asthma without complication    Onychomycosis     Restless leg syndrome    Seasonal allergies    Stress    Respiratory bronchiolitis interstitial lung disease (H)    Spinal epidural abscess    Lumbar spondylosis    Inflammatory arthritis    Arthralgia of temporomandibular joint    Articular disc disorder of temporomandibular joint    Derangement of temporomandibular joint    Calculus of gallbladder without cholecystitis without obstruction    Displacement of articular disc of temporomandibular joint with reduction    Myofascial pain    Oral lesion    Symptomatic cholelithiasis    Sacro-iliac pain    Degenerative lumbar spinal stenosis    Glucose intolerance    Chronic neck pain    Lumbar radiculopathy, chronic    Cervical radiculopathy    Acute pain of right shoulder    Other osteoporosis without current pathological fracture    Diaphragmatic hernia    Bipolar 2 disorder (H)    Choking sensation    Gastro-esophageal reflux disease with esophagitis    Limited opening of mandible    History of pelvic surgery    Voiding dysfunction    Pelvic floor dysfunction    Urinary hesitancy    Urinary frequency    S/P spinal fusion    Cannabis abuse, daily use   Diabetes: no    Progress on goals from last visit: Patient stated that she got very stressed out and gained some weight - thinks she gained ~7 lbs. Patient has been cutting down on her snacking in the evening. Patient stated that she is going to have more snack items on hand to make better choices - pre-cut vegetables, crackers and cheese, fruit, etc. Patient stated that she has not been as active d/t her back - will be going back to PT to help strengthen her core.     Goals:  Continue to prioritize protein intake with meals  Work on increased water consumption, aiming for 64-96 oz/day    Dietary Recall:  Breakfast: protein shake with coffee  Lunch:skipping or will have fruit or graze eat  Dinner (5-6 PM):meat and vegetable/salad  Snacking in the evening - cutting back on this  Eating out: 1x/month - making most of her  meals at home  Beverages:   Zero sugar mountain dew - at 3x/day  Water (zero sugar flavor) - 3-5 (48-80 ounces total) 16 ounce bottles  Exercise:   Physical therapy d/t back surgery - will start restarting this to help build her core and for neck strength  Walking and walking the dogs - doing this a little less consistent d/t her posture while walking  Gardening   Nutrition Diagnosis:    Obesity (NC 3.3) related to overeating and poor lifestyle habits as evidenced by patient's subjective statements and BMI of 33.34 kg/m2.       Intervention:  Food and/or nutrient delivery: try having a bottle of water between every can pf pop. Aim to have three meals per day. When having snacks, pair a protein and complex CHO together.   Nutrition education: Simple Snack Ideas    Monitoring/Evaluation:    Goals:  Try having a bottle of water between every can of pop  Aim for three meals per day  Pair a protein and complex CHO together when having snacks    Patient to follow up in 3 month(s) with DANIEL    Video-Visit Details    Type of service:  Video Visit    Video Start Time (time video started): 1:31 PM    Video End Time (time video stopped): 1:46 PM    Originating Location (pt. Location): Home      Distant Location (provider location):  Off-site    Mode of Communication:  Video Conference via [facility]    Physician has received verbal consent for a Video Visit from the patient? Yes      Evelia Pope RD

## 2024-08-21 ENCOUNTER — VIRTUAL VISIT (OUTPATIENT)
Dept: SURGERY | Facility: CLINIC | Age: 54
End: 2024-08-21
Payer: COMMERCIAL

## 2024-08-21 DIAGNOSIS — E66.811 CLASS 1 OBESITY DUE TO EXCESS CALORIES WITHOUT SERIOUS COMORBIDITY WITH BODY MASS INDEX (BMI) OF 33.0 TO 33.9 IN ADULT: ICD-10-CM

## 2024-08-21 DIAGNOSIS — E66.09 CLASS 1 OBESITY DUE TO EXCESS CALORIES WITHOUT SERIOUS COMORBIDITY WITH BODY MASS INDEX (BMI) OF 33.0 TO 33.9 IN ADULT: ICD-10-CM

## 2024-08-21 DIAGNOSIS — E66.9 OBESITY (BMI 30-39.9): Primary | ICD-10-CM

## 2024-08-21 PROCEDURE — 97803 MED NUTRITION INDIV SUBSEQ: CPT | Mod: 95 | Performed by: DIETITIAN, REGISTERED

## 2024-08-21 NOTE — LETTER
8/21/2024      Zayra Ramirez  [address] [place] [address]      Dear Colleague,    Thank you for referring your patient, Zayra Ramirez, to the [facility] [place]. Please see a copy of my visit note below.    Zayra Ramirez is a 54 year old who is being evaluated via a billable video visit.      How would you like to obtain your AVS? MyChart  If the video visit is dropped, the invitation should be resent by: Text to cell phone: [phone]  Will anyone else be joining your video visit? No        Medical  Weight Loss Follow-Up Diet Evaluation  Assessment:  Zayra is presenting today for a follow up weight management nutrition consultation.  This patient has had an initial appointment and was referred by Dr. Hong  for MNT as treatment for Obesity.   Weight loss medication:  none at this time .   Pt's weight is 187 lbs  Initial weight: 219 lbs  Weight change: 32 lbs, 14.6% weight loss        9/20/2023    10:06 AM   Changes and Difficulties   I have made the following changes to my diet since my last visit: lowered carbs   With regards to my diet, I am still struggling with: enough protein   I have made the following changes to my activity/exercise since my last visit: increased walking   With regards to my activity/exercise, I am still struggling with: walking over a mile atatime     BMI: 33.34  Ideal body weight: 51.9 kg (114 lb 8.1 oz)  Adjusted ideal body weight: 64.4 kg (141 lb 14.5 oz)    Estimated RMR ([place]-St Jeor equation):   1,440 kcals x 1.2 (sedentary) = 1,725 kcals (for weight maintenance)  Recommended Protein Intake: 60-80 grams of protein/day  Patient Active Problem List:  Patient Active Problem List   Diagnosis     Chronic bilateral low back pain without sciatica     Facial cellulitis     Class 1 obesity with serious comorbidity and body mass index (BMI) of 32.0 to 32.9 in adult     Dental abscess     Hypoxia     Subcutaneous  emphysema (H24)     Borderline personality disorder (H)     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)     Esophageal stricture     Obstruction of esophagus     HTN (hypertension)     Interstitial lung disease (H)     Moderate persistent asthma without complication     Onychomycosis     Restless leg syndrome     Seasonal allergies     Stress     Respiratory bronchiolitis interstitial lung disease (H)     Spinal epidural abscess     Lumbar spondylosis     Inflammatory arthritis     Arthralgia of temporomandibular joint     Articular disc disorder of temporomandibular joint     Derangement of temporomandibular joint     Calculus of gallbladder without cholecystitis without obstruction     Displacement of articular disc of temporomandibular joint with reduction     Myofascial pain     Oral lesion     Symptomatic cholelithiasis     Sacro-iliac pain     Degenerative lumbar spinal stenosis     Glucose intolerance     Chronic neck pain     Lumbar radiculopathy, chronic     Cervical radiculopathy     Acute pain of right shoulder     Other osteoporosis without current pathological fracture     Diaphragmatic hernia     Bipolar 2 disorder (H)     Choking sensation     Gastro-esophageal reflux disease with esophagitis     Limited opening of mandible     History of pelvic surgery     Voiding dysfunction     Pelvic floor dysfunction     Urinary hesitancy     Urinary frequency     S/P spinal fusion     Cannabis abuse, daily use   Diabetes: no    Progress on goals from last visit: Patient stated that she got very stressed out and gained some weight - thinks she gained ~7 lbs. Patient has been cutting down on her snacking in the evening. Patient stated that she is going to have more snack items on hand to make better choices - pre-cut vegetables, crackers and cheese, fruit, etc. Patient stated that she has not been as active d/t her back - will be going back to PT to help strengthen her core.     Goals:  Continue to prioritize  protein intake with meals  Work on increased water consumption, aiming for 64-96 oz/day    Dietary Recall:  Breakfast: protein shake with coffee  Lunch:skipping or will have fruit or graze eat  Dinner (5-6 PM):meat and vegetable/salad  Snacking in the evening - cutting back on this  Eating out: 1x/month - making most of her meals at home  Beverages:   Zero sugar mountain dew - at 3x/day  Water (zero sugar flavor) - 3-5 (48-80 ounces total) 16 ounce bottles  Exercise:   Physical therapy d/t back surgery - will start restarting this to help build her core and for neck strength  Walking and walking the dogs - doing this a little less consistent d/t her posture while walking  Gardening   Nutrition Diagnosis:    Obesity (NC 3.3) related to overeating and poor lifestyle habits as evidenced by patient's subjective statements and BMI of 33.34 kg/m2.       Intervention:  Food and/or nutrient delivery: try having a bottle of water between every can pf pop. Aim to have three meals per day. When having snacks, pair a protein and complex CHO together.   Nutrition education: Simple Snack Ideas    Monitoring/Evaluation:    Goals:  Try having a bottle of water between every can of pop  Aim for three meals per day  Pair a protein and complex CHO together when having snacks    Patient to follow up in 3 month(s) with DANIEL    Video-Visit Details    Type of service:  Video Visit    Video Start Time (time video started): 1:31 PM    Video End Time (time video stopped): 1:46 PM    Originating Location (pt. Location): Home      Distant Location (provider location):  Off-site    Mode of Communication:  Video Conference via [facility]    Physician has received verbal consent for a Video Visit from the patient? Yes      Evelia Pope RD           Again, thank you for allowing me to participate in the care of your patient.        Sincerely,        Evelia Pope RD

## 2024-09-10 DIAGNOSIS — M25.512 PAIN OF BOTH SHOULDER JOINTS: Primary | ICD-10-CM

## 2024-09-10 DIAGNOSIS — M25.511 PAIN OF BOTH SHOULDER JOINTS: Primary | ICD-10-CM

## 2024-09-11 NOTE — TELEPHONE ENCOUNTER
DIAGNOSIS: L>R shoulder pain, rotator cuff impingement syndrome, history of RA     APPOINTMENT DATE: 9.13.24   NOTES STATUS DETAILS   OFFICE NOTE from referring provider Internal 8.16.24  [facility]   MEDICATION LIST Internal    MRI Internal 7.13.22  MR Shoulder Right   DEXA (osteoporosis/bone health) Pacs 9.12.18   XRAYS (IMAGES & REPORTS) Internal *sched* for 9.13.24  XR Shoulder Raheel    7.13.22  XR Shoulder Right    2.26.20, 11.2.18  XR Shoulder Left

## 2024-09-13 ENCOUNTER — OFFICE VISIT (OUTPATIENT)
Dept: ORTHOPEDICS | Facility: CLINIC | Age: 54
End: 2024-09-13
Payer: COMMERCIAL

## 2024-09-13 ENCOUNTER — PRE VISIT (OUTPATIENT)
Dept: ORTHOPEDICS | Facility: CLINIC | Age: 54
End: 2024-09-13

## 2024-09-13 ENCOUNTER — ANCILLARY PROCEDURE (OUTPATIENT)
Dept: GENERAL RADIOLOGY | Facility: CLINIC | Age: 54
End: 2024-09-13
Attending: FAMILY MEDICINE
Payer: COMMERCIAL

## 2024-09-13 DIAGNOSIS — Z98.1 S/P CERVICAL SPINAL FUSION: ICD-10-CM

## 2024-09-13 DIAGNOSIS — M25.511 PAIN OF BOTH SHOULDER JOINTS: ICD-10-CM

## 2024-09-13 DIAGNOSIS — M75.111 INCOMPLETE ROTATOR CUFF TEAR OR RUPTURE OF RIGHT SHOULDER, NOT SPECIFIED AS TRAUMATIC: ICD-10-CM

## 2024-09-13 DIAGNOSIS — M25.512 PAIN OF BOTH SHOULDER JOINTS: ICD-10-CM

## 2024-09-13 DIAGNOSIS — M19.012 PRIMARY OSTEOARTHRITIS OF LEFT SHOULDER: Primary | ICD-10-CM

## 2024-09-13 PROCEDURE — 73030 X-RAY EXAM OF SHOULDER: CPT | Mod: RT | Performed by: RADIOLOGY

## 2024-09-13 PROCEDURE — 99214 OFFICE O/P EST MOD 30 MIN: CPT | Performed by: FAMILY MEDICINE

## 2024-09-13 NOTE — LETTER
9/13/2024      RE: Zayra PADRON James  [address] [place] [address]     Dear Colleague,    Thank you for referring your patient, Zayra Ramirez, to the [facility] [place]. Please see a copy of my visit note below.    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 Right shoulder RC tendinopathy/cervical radiculopathy-  Doing PT currently  S/P cervical spinal fusion and is working with spine for further workup regarding her cervical pain and radiculopathy symptoms    Pt is a 54 year old female here today for:     Bilateral shoulder pain, left is worse than the right shoulder. She has had left shoulder pain for at least 1 year and the right shoulder has been present for about 6 months following her cervical spine surgery in February 2024, Dr. Wilhelm. She states they are evaluating if the right shoulder is related to her neck. Denies any injury or change in activity when the left shoulder pain started, she thought she just slept on it wrong. Pain is worse with lifting the arm, WB activities. She is right hand dominant.   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 No   Able to walk? Yes  Swelling? No   Bruising? No  Numbness/ Tingling? No on the left, yes on the right   Weakness? Due to pain   Instability? No  Snapping/Clicking? No  Imaging? Yes, XR obtained today    Treatment? Physical therapy without improvement.      EXAM:   bilateral Shoulder: left shoulder pain  Inspection: asymmetry?  ER on right; Tenderness more on left;  dyskinesis? none   ROM:   Active - forward flexion - 140/ 90 on left/ abduction - 140/ 45 on left/ int rot - symmetric/ ext rot - symmetric   Passive- forward flexion - full/ abduction - full   Strength: deltoid/supraspinatous - 5/5-; infraspinatous/ teres minor - 5/5; subscapularis - 5-/5   Maneuvers:   RTC - empty can - tender; painful arc - pos; lift off - pos   Impingement - Burrell -neg; Neer- neg   AC - cross arm - neg   Biceps - Speed's - neg; Yergason's - pos on right  Labrum - Mars's - neg; Sanches shear - not performed  Instability - Apprehension - not performed   Palpation: AC - neg; Acromion/ supraspinatus - pos; scapula - neg; bicipital groove - neg       Past Medical History:   Diagnosis Date     Allergic rhinitis      Anemia      Arthritis      Asthma     copd     Cervical pseudoarthrosis, sequela      Dental abscess 08/2015     Depressive disorder      Diaphragmatic hernia 07/11/2016     Gastroesophageal reflux disease      History of emphysema (H)      Hoarseness      Hypertension      Obstructive sleep apnea      Other chronic pain      Respiratory bronchiolitis interstitial lung disease (H)      Sleep apnea      Smoker 11/02/2015      Past Surgical History:   Procedure Laterality Date     CERVICAL FUSION       COLONOSCOPY       COLONOSCOPY N/A 02/06/2020    Procedure: COLONOSCOPY, WITH POLYPECTOMY AND BIOPSY;  Surgeon: Julian Mccullough MD;  Location:  GI     CYSTOSCOPY       ENT SURGERY       ESOPHAGOSCOPY, GASTROSCOPY, DUODENOSCOPY (EGD), COMBINED N/A 02/06/2020    Procedure: ESOPHAGOGASTRODUODENOSCOPY (EGD);  Surgeon: Julian Mccullough MD;  Location:  GI     EXCISE LESION INTRAORAL Bilateral 10/03/2018    Procedure: EXCISE LESION INTRAORAL;  Wide Local Excision Of of Left Oral Cavity Ulcer;  Surgeon: Morro Mijares MD;  [facility]     GYN SURGERY       HC DRAIN SKIN ABSCESS SIMPLE/SINGLE  03/16/2012    Procedure:INCISION AND DRAINAGE, ABSCESS, SIMPLE;  Surgeon:CHRISTIANO HANCOCK; Location: GI     HEAD & NECK SURGERY       HYSTERECTOMY       HYSTERECTOMY       INCISION AND DRAINAGE ABDOMEN WASHOUT, COMBINED       INJECT EPIDURAL CERVICAL N/A 10/14/2021    Procedure: Cervical 7- Thoracic 1 epidural steroid injection with fluoroscopy;  Surgeon: Tram Florian MD;  Location: [facility] OR     INJECT EPIDURAL LUMBAR Right 09/15/2020    Procedure: Lumbar5- sacral 1 epidural steroid injection with fluoroscopy;  Surgeon: Tram Florian MD;  [facility]     INJECT EPIDURAL LUMBAR Right 06/29/2021    Procedure: Lumbar 5 sacral 1 epidural steroid injection with fluoroscopy;  Surgeon: Tram Florian MD;  Location: [facility] OR     INJECT SACROILIAC JOINT Bilateral 06/16/2020    Procedure: Bilateral sacroiliac joint steroid injection with fluoroscopy;  Surgeon: Tram Florian MD;  [facility]     LAMINECTOMY THORACIC ONE LEVEL N/A 08/19/2019    Procedure: LAMINECTOMY, SPINE, THORACIC, 11-12 and Part of Lumbar 1, DRAINAGE OF EPIDURAL ABCESS, Epidural Drain Placement X 2;  Surgeon: Yadiel Beal MD;  Location: [facility] OR     OPTICAL TRACKING SYSTEM FUSION POSTERIOR CERVICAL THREE + LEVELS N/A 2/12/2024    Procedure: Posterior instrumented spinal fusion cervical 2-5; laminectomies cervical 3-4; use of local autograft and crushed cancellous allograft.;  Surgeon: Philip Wilhelm MD;  Location: [facility] OR     OPTICAL TRACKING SYSTEM FUSION SPINE POSTERIOR LUMBAR THREE+ LEVELS N/A 11/30/2022    Procedure: Posterior Instrumented Spinal Fusion Thoracic 8 to Sacrum with Bilateral Pelvic Fixation; Transforaminal Lumbar Interbody Fusion with Erickson-Nixon Osteotomy Lumbar 1 to Sacral 1 (5 levels); Use of Infuse Bone Morphogenetic Protein Large Kit and Allograft;  Surgeon: Philip Wilhelm MD;  Location: [facility] OR     ORTHOPEDIC SURGERY       ME PERCUT IMPLNT NEUROELECT,EPIDURAL N/A 08/08/2019    Procedure: TRIAL, SPINAL CORD STIMULATOR WITH BOSTON SCIENTIFIC;  Surgeon:  Sipple, Daniel Peter, DO;  Location: [facility];  Service: Pain     RADIO FREQUENCY ABLATION / DESTRUCTION OF SACROILOAC JOINT DORSAL PRIMARY RAMUS Bilateral 12/17/2019    Procedure: Bilateral lumbar radiofrequency ablation with fluoroscopy and intravenous sedation ( Lumbar 2,3,4,5 medial branch nerves for the bilateral lumbar3-4, 4-5 and 5-sacral1 joints.;  Surgeon: Tram Florian MD;  Location:  OR     RADIO FREQUENCY ABLATION / DESTRUCTION OF SACROILOAC JOINT DORSAL PRIMARY RAMUS Right 11/17/2020    Procedure: Right lumbar medial branch nerve radiofrequency ablation right L2,3,4,5 nerves supplying the right L3-4, L4-5 and L5-S1 facet joints;  Surgeon: Tram Florian MD;  Location: [facility] OR     spinal cord stimulator  08/08/2019     spinal cord stimulator removal  08/13/2019      Current Outpatient Medications   Medication Sig Dispense Refill     acetaminophen (TYLENOL) 500 MG tablet Take 1-2 tablets (500-1,000 mg) by mouth every 6 hours as needed for mild pain 100 tablet 1     albuterol (PROAIR HFA/PROVENTIL HFA/VENTOLIN HFA) 108 (90 Base) MCG/ACT inhaler Inhale 2 puffs into the lungs every 6 hours as needed for shortness of breath / dyspnea or wheezing Ventolin please 18 g 2     albuterol (PROVENTIL) (2.5 MG/3ML) 0.083% neb solution INHALE 3 ML VIA A NEBULIZER 4 TIMES DAILY IF NEEDED.       ARIPiprazole (ABILIFY) 5 MG tablet Take 5 mg by mouth every morning       azelastine (ASTELIN) 0.1 % nasal spray Spray 2 sprays in nostril 2 times daily       busPIRone HCl (BUSPAR) 30 MG tablet Take 30 mg by mouth 2 times daily        cloNIDine (CATAPRES) 0.1 MG tablet Take 1 tablet by mouth as needed       cyclobenzaprine (FLEXERIL) 10 MG tablet TAKE 1 TABLET BY MOUTH EVERYDAY AT BEDTIME 90 tablet 1     DULoxetine (CYMBALTA) 60 MG capsule Take 120 mg by mouth At Bedtime        EPINEPHrine (ANY BX GENERIC EQUIV) 0.3 MG/0.3ML injection 2-pack Inject 0.3 mLs (0.3 mg) into the muscle as needed for anaphylaxis May repeat  one time in 5-15 minutes if response to initial dose is inadequate. 2 each 1     hydroxychloroquine (PLAQUENIL) 200 MG tablet Take 1 tablet (200 mg) by mouth 2 times daily 180 tablet 3     insulin pen needle (32G X 4 MM) 32G X 4 MM miscellaneous Use 1 NEEDLE WEEKLY 12 each 3     Lidocaine (LIDOCARE) 4 % Patch Place 1 patch onto the skin every 24 hours To prevent lidocaine toxicity, patient should be patch free for 12 hrs daily.Do not place directly over the incision. 15 patch 3     liraglutide - Weight Management (SAXENDA) 18 MG/3ML pen INJECT 3 MG SUBCUTANEOUS DAILY FOR 90 DAYS 45 mL 0     methocarbamol (ROBAXIN) 750 MG tablet Take 1 tablet (750 mg) by mouth 3 times daily 35 tablet 2     montelukast (SINGULAIR) 10 MG tablet Take 1 tablet by mouth At Bedtime       naloxone (NARCAN) 4 MG/0.1ML nasal spray Spray 1 spray (4 mg) into one nostril alternating nostrils as needed for opioid reversal every 2-3 minutes until assistance arrives 0.2 mL 0     omeprazole (PRILOSEC) 40 MG DR capsule TAKE 1 CAPSULE BY MOUTH EVERY DAY (Patient taking differently: Take 40 mg by mouth every evening) 90 capsule 3     OXYGEN-HELIUM IN 3L @ night    Oxygen only not helium       polyethylene glycol (MIRALAX) 17 GM/Dose powder Take 17 g by mouth daily       predniSONE 5 MG (21) TBPK Take 5 mg by mouth daily       pregabalin (LYRICA) 150 MG capsule Take 1 capsule by mouth in the morning and around 1 PM 60 capsule 5     pregabalin (LYRICA) 150 MG capsule Take 1 capsule by mouth in the morning and around 1 PM 60 capsule 3     Respiratory Therapy Supplies ([facility] CPAP FILTER) MISC        rOPINIRole (REQUIP) 0.5 MG tablet Take 1 tablet (0.5 mg) by mouth at bedtime 90 tablet 1     SENNA-docusate sodium (SENNA S) 8.6-50 MG tablet Take 1 tablet by mouth at bedtime 30 tablet 2     STIOLTO RESPIMAT 2.5-2.5 MCG/ACT AERS Inhale 2 puffs into the lungs every morning        Allergies   Allergen Reactions     Bee Venom Anaphylaxis      Doxycycline Anaphylaxis     Patient thinks it may have been just nausea and vomiting, however unable to confirm  Other reaction(s): throat closes     Erythromycin      Other reaction(s): Vomiting       Hydrocodone-Acetaminophen Itching      ROS:   Gen- no fevers/chills   Rheum - no morning stiffness   Derm - no rash/ redness   Neuro - numbness- right hand, no tingling   Remainder of ROS negative.     Exam:   LMP  (LMP Unknown)      Xray of shoulder on September 13, 2024 at [facility] [place] - films personally reviewed with patient at time of visit     My impression: Right shoulder- subchondral cysts, RC injury; left severe GH OA     Impression:  1. Severe degenerative changes of the left glenohumeral joint,  markedly progressed since comparison.  2. Moderate degenerative changes of the right acromioclavicular joint.  3. Mild inferior subluxation of the right humeral head relative to  glenoid, likely reflecting presence of joint effusion.       Again, thank you for allowing me to participate in the care of your patient.      Sincerely,    Doreen Haas MD

## 2024-09-13 NOTE — PROGRESS NOTES
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 Right shoulder RC tendinopathy/cervical radiculopathy-  Doing PT currently  S/P cervical spinal fusion and is working with spine for further workup regarding her cervical pain and radiculopathy symptoms    Pt is a 54 year old female here today for:     Bilateral shoulder pain, left is worse than the right shoulder. She has had left shoulder pain for at least 1 year and the right shoulder has been present for about 6 months following her cervical spine surgery in February 2024, Dr. Wilhelm. She states they are evaluating if the right shoulder is related to her neck. Denies any injury or change in activity when the left shoulder pain started, she thought she just slept on it wrong. Pain is worse with lifting the arm, WB activities. She is right hand dominant.   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 No   Able to walk? Yes  Swelling? No   Bruising? No  Numbness/ Tingling? No on the left, yes on the right   Weakness? Due to pain   Instability? No  Snapping/Clicking? No  Imaging? Yes, XR obtained today    Treatment? Physical therapy without improvement.      EXAM:   bilateral Shoulder: left shoulder pain  Inspection: asymmetry?  ER on right; Tenderness more on left; dyskinesis? none   ROM:   Active - forward flexion - 140/ 90 on left/ abduction - 140/ 45 on left/ int rot - symmetric/ ext rot - symmetric   Passive- forward flexion - full/ abduction - full   Strength: deltoid/supraspinatous - 5/5-; infraspinatous/ teres minor - 5/5;  subscapularis - 5-/5   Maneuvers:   RTC - empty can - tender; painful arc - pos; lift off - pos   Impingement - Burrell -neg; Neer- neg   AC - cross arm - neg   Biceps - Speed's - neg; Yergason's - pos on right  Labrum - Mars's - neg; Sanches shear - not performed  Instability - Apprehension - not performed   Palpation: AC - neg; Acromion/ supraspinatus - pos; scapula - neg; bicipital groove - neg       Past Medical History:   Diagnosis Date    Allergic rhinitis     Anemia     Arthritis     Asthma     copd    Cervical pseudoarthrosis, sequela     Dental abscess 08/2015    Depressive disorder     Diaphragmatic hernia 07/11/2016    Gastroesophageal reflux disease     History of emphysema (H)     Hoarseness     Hypertension     Obstructive sleep apnea     Other chronic pain     Respiratory bronchiolitis interstitial lung disease (H)     Sleep apnea     Smoker 11/02/2015      Past Surgical History:   Procedure Laterality Date    CERVICAL FUSION      COLONOSCOPY      COLONOSCOPY N/A 02/06/2020    Procedure: COLONOSCOPY, WITH POLYPECTOMY AND BIOPSY;  Surgeon: Julian Mccullough MD;  Location:  GI    CYSTOSCOPY      ENT SURGERY      ESOPHAGOSCOPY, GASTROSCOPY, DUODENOSCOPY (EGD), COMBINED N/A 02/06/2020    Procedure: ESOPHAGOGASTRODUODENOSCOPY (EGD);  Surgeon: Julian Mccullough MD;  Location:  GI    EXCISE LESION INTRAORAL Bilateral 10/03/2018    Procedure: EXCISE LESION INTRAORAL;  Wide Local Excision Of of Left Oral Cavity Ulcer;  Surgeon: Morro Mijares MD;  Location: [facility] OR    GYN SURGERY      HC DRAIN SKIN ABSCESS SIMPLE/SINGLE  03/16/2012    Procedure:INCISION AND DRAINAGE, ABSCESS, SIMPLE; Surgeon:CHRISTIANO HANCOCK; Location: GI    HEAD & NECK SURGERY      HYSTERECTOMY      HYSTERECTOMY      INCISION AND DRAINAGE ABDOMEN WASHOUT, COMBINED      INJECT EPIDURAL CERVICAL N/A 10/14/2021    Procedure: Cervical 7- Thoracic 1 epidural steroid injection with fluoroscopy;  Surgeon: Tram Florian  MD;  Location: [facility] OR    INJECT EPIDURAL LUMBAR Right 09/15/2020    Procedure: Lumbar5- sacral 1 epidural steroid injection with fluoroscopy;  Surgeon: Tram Florian MD;  [facility]    INJECT EPIDURAL LUMBAR Right 06/29/2021    Procedure: Lumbar 5 sacral 1 epidural steroid injection with fluoroscopy;  Surgeon: Tram Florian MD;  Location: [facility] OR    INJECT SACROILIAC JOINT Bilateral 06/16/2020    Procedure: Bilateral sacroiliac joint steroid injection with fluoroscopy;  Surgeon: Tarm Florian MD;  [facility]    LAMINECTOMY THORACIC ONE LEVEL N/A 08/19/2019    Procedure: LAMINECTOMY, SPINE, THORACIC, 11-12 and Part of Lumbar 1, DRAINAGE OF EPIDURAL ABCESS, Epidural Drain Placement X 2;  Surgeon: Yadiel Beal MD;  Location: [facility] OR    OPTICAL TRACKING SYSTEM FUSION POSTERIOR CERVICAL THREE + LEVELS N/A 2/12/2024    Procedure: Posterior instrumented spinal fusion cervical 2-5; laminectomies cervical 3-4; use of local autograft and crushed cancellous allograft.;  Surgeon: Philip Wilhelm MD;  Location: [facility]    OPTICAL TRACKING SYSTEM FUSION SPINE POSTERIOR LUMBAR THREE+ LEVELS N/A 11/30/2022    Procedure: Posterior Instrumented Spinal Fusion Thoracic 8 to Sacrum with Bilateral Pelvic Fixation; Transforaminal Lumbar Interbody Fusion with Erickson-Nixon Osteotomy Lumbar 1 to Sacral 1 (5 levels); Use of Infuse Bone Morphogenetic Protein Large Kit and Allograft;  Surgeon: Philip Wilhelm MD;  Location: [facility] OR    ORTHOPEDIC SURGERY      AK PERCUT IMPLNT NEUROELECT,EPIDURAL N/A 08/08/2019    Procedure: TRIAL, SPINAL CORD STIMULATOR WITH BOSTON SCIENTIFIC;  Surgeon: Sipple, Daniel Peter, DO;  Location: [facility];  Service: Pain    RADIO FREQUENCY ABLATION / DESTRUCTION OF SACROILOAC JOINT DORSAL PRIMARY RAMUS Bilateral 12/17/2019    Procedure: Bilateral lumbar radiofrequency ablation with fluoroscopy and intravenous sedation ( Lumbar 2,3,4,5 medial branch nerves for the  bilateral lumbar3-4, 4-5 and 5-sacral1 joints.;  Surgeon: Tram Florian MD;  [facility]    RADIO FREQUENCY ABLATION / DESTRUCTION OF SACROILOAC JOINT DORSAL PRIMARY RAMUS Right 11/17/2020    Procedure: Right lumbar medial branch nerve radiofrequency ablation right L2,3,4,5 nerves supplying the right L3-4, L4-5 and L5-S1 facet joints;  Surgeon: Tram Florian MD;  Location: [facility] OR    spinal cord stimulator  08/08/2019    spinal cord stimulator removal  08/13/2019      Current Outpatient Medications   Medication Sig Dispense Refill    acetaminophen (TYLENOL) 500 MG tablet Take 1-2 tablets (500-1,000 mg) by mouth every 6 hours as needed for mild pain 100 tablet 1    albuterol (PROAIR HFA/PROVENTIL HFA/VENTOLIN HFA) 108 (90 Base) MCG/ACT inhaler Inhale 2 puffs into the lungs every 6 hours as needed for shortness of breath / dyspnea or wheezing Ventolin please 18 g 2    albuterol (PROVENTIL) (2.5 MG/3ML) 0.083% neb solution INHALE 3 ML VIA A NEBULIZER 4 TIMES DAILY IF NEEDED.      ARIPiprazole (ABILIFY) 5 MG tablet Take 5 mg by mouth every morning      azelastine (ASTELIN) 0.1 % nasal spray Spray 2 sprays in nostril 2 times daily      busPIRone HCl (BUSPAR) 30 MG tablet Take 30 mg by mouth 2 times daily       cloNIDine (CATAPRES) 0.1 MG tablet Take 1 tablet by mouth as needed      cyclobenzaprine (FLEXERIL) 10 MG tablet TAKE 1 TABLET BY MOUTH EVERYDAY AT BEDTIME 90 tablet 1    DULoxetine (CYMBALTA) 60 MG capsule Take 120 mg by mouth At Bedtime       EPINEPHrine (ANY BX GENERIC EQUIV) 0.3 MG/0.3ML injection 2-pack Inject 0.3 mLs (0.3 mg) into the muscle as needed for anaphylaxis May repeat one time in 5-15 minutes if response to initial dose is inadequate. 2 each 1    hydroxychloroquine (PLAQUENIL) 200 MG tablet Take 1 tablet (200 mg) by mouth 2 times daily 180 tablet 3    insulin pen needle (32G X 4 MM) 32G X 4 MM miscellaneous Use 1 NEEDLE WEEKLY 12 each 3    Lidocaine (LIDOCARE) 4 % Patch Place 1 patch onto the  skin every 24 hours To prevent lidocaine toxicity, patient should be patch free for 12 hrs daily.Do not place directly over the incision. 15 patch 3    liraglutide - Weight Management (SAXENDA) 18 MG/3ML pen INJECT 3 MG SUBCUTANEOUS DAILY FOR 90 DAYS 45 mL 0    methocarbamol (ROBAXIN) 750 MG tablet Take 1 tablet (750 mg) by mouth 3 times daily 35 tablet 2    montelukast (SINGULAIR) 10 MG tablet Take 1 tablet by mouth At Bedtime      naloxone (NARCAN) 4 MG/0.1ML nasal spray Spray 1 spray (4 mg) into one nostril alternating nostrils as needed for opioid reversal every 2-3 minutes until assistance arrives 0.2 mL 0    omeprazole (PRILOSEC) 40 MG DR capsule TAKE 1 CAPSULE BY MOUTH EVERY DAY (Patient taking differently: Take 40 mg by mouth every evening) 90 capsule 3    OXYGEN-HELIUM IN 3L @ night    Oxygen only not helium      polyethylene glycol (MIRALAX) 17 GM/Dose powder Take 17 g by mouth daily      predniSONE 5 MG (21) TBPK Take 5 mg by mouth daily      pregabalin (LYRICA) 150 MG capsule Take 1 capsule by mouth in the morning and around 1 PM 60 capsule 5    pregabalin (LYRICA) 150 MG capsule Take 1 capsule by mouth in the morning and around 1 PM 60 capsule 3    Respiratory Therapy Supplies ([facility] CPAP FILTER) MISC       rOPINIRole (REQUIP) 0.5 MG tablet Take 1 tablet (0.5 mg) by mouth at bedtime 90 tablet 1    SENNA-docusate sodium (SENNA S) 8.6-50 MG tablet Take 1 tablet by mouth at bedtime 30 tablet 2    STIOLTO RESPIMAT 2.5-2.5 MCG/ACT AERS Inhale 2 puffs into the lungs every morning        Allergies   Allergen Reactions    Bee Venom Anaphylaxis    Doxycycline Anaphylaxis     Patient thinks it may have been just nausea and vomiting, however unable to confirm  Other reaction(s): throat closes    Erythromycin      Other reaction(s): Vomiting      Hydrocodone-Acetaminophen Itching      ROS:   Gen- no fevers/chills   Rheum - no morning stiffness   Derm - no rash/ redness   Neuro - numbness- right hand,  no tingling   Remainder of ROS negative.     Exam:   LMP  (LMP Unknown)      [place] on [place] 13, 20[place] [facility] [place] personally reviewed with patient at time of visit     My impression: Right shoulder- subchondral cysts, RC injury; left severe GH OA     Impression:  1. Severe degenerative changes of the left glenohumeral joint,  markedly progressed since comparison.  2. Moderate degenerative changes of the right acromioclavicular joint.  3. Mild inferior subluxation of the right humeral head relative to  glenoid, likely reflecting presence of joint effusion.

## 2024-09-18 ENCOUNTER — MYC REFILL (OUTPATIENT)
Dept: INTERNAL MEDICINE | Facility: CLINIC | Age: 54
End: 2024-09-18
Payer: COMMERCIAL

## 2024-09-18 DIAGNOSIS — G25.81 RESTLESS LEG SYNDROME: ICD-10-CM

## 2024-09-19 RX ORDER — ROPINIROLE 0.5 MG/1
0.5 TABLET, FILM COATED ORAL AT BEDTIME
Qty: 90 TABLET | Refills: 1 | Status: SHIPPED | OUTPATIENT
Start: 2024-09-19

## 2024-09-19 NOTE — TELEPHONE ENCOUNTER
Pending Prescriptions:                       Disp   Refills    rOPINIRole (REQUIP) 0.5 MG tablet         90 tab*1            Sig: Take 1 tablet (0.5 mg) by mouth at bedtime.          Last Written Prescription Date:  3/25/24  Last Fill Quantity: 90   # refills: 1  Last Office Visit: 10/23/23  Future Office visit:  10/16/24      Routing refill request to provider for review/approval because:  Drug not on the [facility], P or [facility] refill protocol or controlled substance

## 2024-09-25 NOTE — PROGRESS NOTES
[facility]  Rheumatology Clinic  Panchito Saenz MD  2024     Name: Zayra Ramirez  MRN: [id number]  Age: 54 year old  : 1970  Referring provider: Aime Mason    Assessment and Plan:  # Seronegative inflammatory arthritis:  Daily joint pain and widespread morning stiffness have recurred focused in both hands in the last several months.  Exam shows no synovial thickening, but tenderness in knuckles fingers toes or mid feet; there is marked pain restricted range of motion of the left shoulder.    Data: In 2024, basic metabolic panel was normal; CRP was minimally elevated at 17.5; TSH was normal; sed rate was normal at 21; hepatitis C and HIV were negative.     Discussion: Persistent and recurrent morning stiffness and increasing small joint predominant hand and toe pain could reflect recurrent inflammatory arthritis, not completely responsive to hydroxychloroquine.  Patient patient has used only hydroxychloroquine monotherapy since discontinuing methotrexate in early .  It is possible that long-acting disease modifying therapy augmentation would be helpful, I recommend laboratory and x-ray workup to determine this.  Meanwhile, I recommend a trial of celecoxib 100 to 200 mg daily for several week trial.  I recommend remaining off methotrexate for now and continuing hydroxychloroquine.  Combination therapy with anti-TNF could be contemplated for recurrence of inflammatory polyarthritis.    # Degenerative arthritis, neck and lumbar spine: Chronic low back pain is markedly improved after lumbar fusion surgery in 2022 by Dr. Krishnan.  Discussions are ongoing regarding demonstrated degenerative disc disease in the cervical spine and possible surgical solutions for intermittent radicular pain.    #Rotator cuff tendinitis, DJD, left glenohumeral joint: Left greater than right, potentially related to calcium pyrophosphate deposition disease; Crystal examination on right shoulder synovial  fluid revealed CPPD crystals in July 2022.: I recommended resumption of home exercise program for maintaining and improving shoulder range of motion.  Stated that we could perform subacromial injection if physical therapeutic means do not result in improvement in 4 to 5 weeks.    # Tobacco abuse:   Patient quit smoking in early 2022    # LFT increase: Cause of mild transaminase elevation in early December 2022 is not clear.  Methotrexate had been off for approximately 4 weeks at that blood draw.  I recommend rechecking LFTs today.    Discussed:  Diagnosis:  1.  Inflammatory arthritis: Small joint symptoms in the hands and feet remain improved, but morning stiffness remains.  I recommend checking blood work, x-rays, and continuing hydroxychloroquin.  2.  Osteoarthritis, rotator cuff disease, left left more so than right shoulder: I agree with injections, referral to orthopedic surgery.  4.  Pseudogout, right shoulder: No recurrence of inflammatory symptoms.    Plan:  1. Continue hydroxychloroquine 200 mg twice a day.While using hydroxychloroquine, undergo annual examination of the retina for rare retinal toxicity.  First ophthalmology visit may occur in late 2024.  2.  Check creatinine, complete blood count, liver function, inflammation today and once every 6 months while using hydroxychloroquine.  X-rays of the hands today.  3.  Make a trial of celecoxib 100 mg twice daily for joint pain for the next 2 weeks.  If significant relief of hand arthritis pain, reduce celecoxib to find the minimum effective dose.  Example: Reduce celecoxib to once daily dosing, or to dosing 3 days/week.  4.  Use acetaminophen up to 1000 mg 4 times a day, and celecoxib 100 mg once or twice daily.    Panchito Saenz MD  Staff Rheumatologist, [facility]    On the day of the encounter, a total of 31 minutes was spent in chart review, and in counseling and coordination of care, regarding the patient's complex medical problem of degenerative  joint disease, shoulders, osteoarthritis versus inflammatory arthritis hands, high risk medication.    The longitudinal plan of care for the diagnosis(es)/condition(s) as documented were addressed during this visit. Due to the added complexity in care, I will continue to support Zayra in the subsequent management and with ongoing continuity of care.    Orders Placed This Encounter   Procedures    XR Hand Bilateral 1 View    Erythrocyte sedimentation rate auto    CBC with platelets    AST    ALT    Albumin level    Creatinine    CRP inflammation           HPI:   Zayra Ramirez follows up for seronegative inflammatory arthritis and L shoulder pain.  She was last seen October 2023, with seronegative inflammatory arthritis was active.  I recommended resumption of immunomodulatory therapy with hydroxychloroquine.  Methotrexate was discontinued due to the risk of liver toxicity.    Interval history September 26, 2024    Patient saw Dr. Haas in sports medicine on September 13, 2024.  Impression was of bilateral shoulder pain; ultrasound-guided intra-articular injection in the left shoulder was given.  Recommendation was to continue physical therapy and follow-up with spine surgery for cervical pain and radiculopathy symptoms.    She notes increasing finger swelling and stiffness over the last few months. Equal pain on both sides. Pain worse in the mornings, improved after 30-60 min early morning stiffness.Repetitive activity does not affect the pain much.    L > R  shoulder pain has worsened.    Hydroxychloroquine 400 mg daily has continued since last October.  It is well-tolerated.        Interval history October 12, 2023    Patient relates gradual return of achy pain in left greater than right shoulder, both knees, both ankles, as well as ankles and wrists to a lesser extent..  There is no visible swelling in hands or feet, but in the mornings, joints are stiff for at least 1 hour.  Pain is relieved modestly by  "celecoxib, acetaminophen does not provide much relief.    She has ongoing right shoulder and shooting pain down into the hand, she attributes to nerve pinching in the right side of the neck.  She will address this with Dr. Ballard.    She stopped methotrexate approximately 2 years ago prior to back surgery (which was very successful; she is doing very well with regard to her lower back.) and has not restarted.  She did not start hydroxychloroquine after the last visit, for unclear reasons, she did not receive communication about the Plaquenil recommendation.  Interval history February 28, 2023    Patient was seen by Dr. Wilhelm in orthopedics on February 16, 2023 in follow-up of spinal fusion surgery performed in November 2022.  Impression was of satisfactory postoperative recovery, off opioids.  Doing well with regards to her lower back.  Continued right sided radicular arm pain was noted.  Concern was for left-sided cervical neuritic symptoms, potentially related to compressive neuropathy.  Recommendation was to continue with nonoperative management and have consultation with pain provider.    She stopped methotrexate before back surgery and started hydroxychloroquine 200 mg tiwce daily. She has continued with hydroxychloroquine.   Today she is doing good with respect to small joint pain.  Her shoulders feel \"tight\" but less painful than last year, comfortable; she does use aceta/ibu each day.  She stil has 1-2 hours of early morning stiffness in small joints, but she is not disabled by the stiffness. Helped by Ptx.    Interval history July 29, 2022    Patient underwent left subacromial injection in rheumatology clinic on Yessenia 3, 2022. She noted signifidcant improvement, with ability to use the L shoulder.     Patient was hospitalized at [facility] from July 13 through July 15, 2022 for pseudogout flareup, and rheumatoid arthritis.  History was of sudden worsening of right shoulder pain on July 12, " "associated with elevated inflammatory markers and leukocytosis.  Shoulder aspiration was performed in the emergency room and fluid analysis revealed CPPD crystals; she underwent shoulder injection with dramatic improvement, and she was discharged on celebrex.    Since discharge, the R shoulder has been better, able to tolerate gardening and other ADL.   Using acetaminophen 3 500 mg twice daily.    She is doing Ptx both for her shoulders and for her back. She is doing exercises at home daily for the R shoulder.     Interval history June 2, 2022    She still has waxing/waning pain in both shoulders, knees, hips. Sieges of pain start in the mornings, \"I can't even get out of bed due to severe shoulder/elbow pain\". Oftentimes it takes an hour to get out of bed, and a total of 2 hours of early morning stiffness is common.  Former pain affecting fingers wrists and toes has lessened somewhat.    Chronic back/neck pain is still a problem. She has seen Dr. Wilhelm, who has discussed low back surgery pending a weight change.     She is still on methotrexate 10 tabs weekly (since 2-2022). She does not think it helps.  She has used several course of prendionse and 2 courses of medrol, last dose 2 weeks ago.   She is \"at least 50%\" better during steroid tapers; symptoms come back within 10 days of stopping. Not using ibuprofen.     Review of Systems:   Pertinent items are noted in HPI or as below, remainder of complete ROS is negative.      No recent problems with hearing or vision. No swallowing problems.   No breathing difficulty, shortness of breath, coughing, or wheezing.  No chest pain or palpitations.  No heart burn, indigestion, abdominal pain, nausea, vomiting, diarrhea.  No urination problems, no bloody, cloudy urine, no dysuria.  No numbing, tingling, weakness.  No headaches or confusion.  No rashes. No easy bleeding or bruising.     Active Medications:   Current Outpatient Medications   Medication Sig Dispense Refill "    acetaminophen (TYLENOL) 500 MG tablet Take 1-2 tablets (500-1,000 mg) by mouth every 6 hours as needed for mild pain 100 tablet 1    albuterol (PROAIR HFA/PROVENTIL HFA/VENTOLIN HFA) 108 (90 Base) MCG/ACT inhaler Inhale 2 puffs into the lungs every 6 hours as needed for shortness of breath / dyspnea or wheezing Ventolin please 18 g 2    albuterol (PROVENTIL) (2.5 MG/3ML) 0.083% neb solution INHALE 3 ML VIA A NEBULIZER 4 TIMES DAILY IF NEEDED.      ARIPiprazole (ABILIFY) 5 MG tablet Take 5 mg by mouth every morning      azelastine (ASTELIN) 0.1 % nasal spray Spray 2 sprays in nostril 2 times daily      busPIRone HCl (BUSPAR) 30 MG tablet Take 30 mg by mouth 2 times daily       cloNIDine (CATAPRES) 0.1 MG tablet Take 1 tablet by mouth as needed      cyclobenzaprine (FLEXERIL) 10 MG tablet TAKE 1 TABLET BY MOUTH EVERYDAY AT BEDTIME 90 tablet 1    DULoxetine (CYMBALTA) 60 MG capsule Take 120 mg by mouth At Bedtime       EPINEPHrine (ANY BX GENERIC EQUIV) 0.3 MG/0.3ML injection 2-pack Inject 0.3 mLs (0.3 mg) into the muscle as needed for anaphylaxis May repeat one time in 5-15 minutes if response to initial dose is inadequate. 2 each 1    hydroxychloroquine (PLAQUENIL) 200 MG tablet Take 1 tablet (200 mg) by mouth 2 times daily 180 tablet 3    insulin pen needle (32G X 4 MM) 32G X 4 MM miscellaneous Use 1 NEEDLE WEEKLY 12 each 3    Lidocaine (LIDOCARE) 4 % Patch Place 1 patch onto the skin every 24 hours To prevent lidocaine toxicity, patient should be patch free for 12 hrs daily.Do not place directly over the incision. 15 patch 3    liraglutide - Weight Management (SAXENDA) 18 MG/3ML pen INJECT 3 MG SUBCUTANEOUS DAILY FOR 90 DAYS 45 mL 0    methocarbamol (ROBAXIN) 750 MG tablet Take 1 tablet (750 mg) by mouth 3 times daily 35 tablet 2    montelukast (SINGULAIR) 10 MG tablet Take 1 tablet by mouth At Bedtime      naloxone (NARCAN) 4 MG/0.1ML nasal spray Spray 1 spray (4 mg) into one nostril alternating nostrils as  needed for opioid reversal every 2-3 minutes until assistance arrives 0.2 mL 0    omeprazole (PRILOSEC) 40 MG DR capsule TAKE 1 CAPSULE BY MOUTH EVERY DAY (Patient taking differently: Take 40 mg by mouth every evening.) 90 capsule 3    OXYGEN-HELIUM IN 3L @ night    Oxygen only not helium      polyethylene glycol (MIRALAX) 17 GM/Dose powder Take 17 g by mouth daily      predniSONE 5 MG (21) TBPK Take 5 mg by mouth daily      pregabalin (LYRICA) 150 MG capsule Take 1 capsule by mouth in the morning and around 1 PM 60 capsule 5    pregabalin (LYRICA) 150 MG capsule Take 1 capsule by mouth in the morning and around 1 PM 60 capsule 3    Respiratory Therapy Supplies ([facility] CPAP FILTER) MISC       rOPINIRole (REQUIP) 0.5 MG tablet Take 1 tablet (0.5 mg) by mouth at bedtime. 90 tablet 1    SENNA-docusate sodium (SENNA S) 8.6-50 MG tablet Take 1 tablet by mouth at bedtime 30 tablet 2    STIOLTO RESPIMAT 2.5-2.5 MCG/ACT AERS Inhale 2 puffs into the lungs every morning       No current facility-administered medications for this visit.     Facility-Administered Medications Ordered in Other Visits   Medication Dose Route Frequency Provider Last Rate Last Admin    Lidocaine 1 % injection 0.5-5 mL  0.5-5 mL Other Once PRN Gutierrez Candelario MD        sodium chloride (PF) 0.9% PF flush 5-50 mL  5-50 mL Intracatheter Once PRN Gutierrez Candelario MD               Allergies:   Bee Venom       Bees      Doxycycline       Erythromycin     Hydrocodone-Acetaminophen                 Past Medical History:  Remarkable for moderate, persistent asthma, hypertension, bipolar II disorder, interstitial lung disease, cervical stenosis with myelopathy 2017.       Chronic midline low back pain  Facial cellulitis  Morbid (severe) obesity due to excess calories  Dental abscess  Hypoxia  Subcutaneous emphysema  Borderline personality disorder  Stenosis of cervical spine with myelopathy  Esophageal stricture  Gastroesophageal reflux disease  "  Hypertension   Interstitial lung disease  Moderate persistent asthma without complication  Onychomycosis  Restless leg syndrome  Seasonal allergies  Smoker  Stress  Respiratory bronchiolitis interstitial lung disease     Past Surgical History:  Remarkable for surgical fusion  Hysterectomy 1997  rectocele and Cystocele surgery  Cholecystectomy     Family History:    The patient's family history includes Asthma in her mother; Back Pain in her father; Hypertension in her father; Other Cancer in her father.    Social History:  The patient reports that she has been smoking cigarettes, around 0.5 packs per day. She started smoking about 23 years ago. She has a 30.00 pack-year smoking history. She has never used smokeless tobacco. She reports that she does not drink alcohol or use drugs.   PCP: Adam Del Toro  Marital Status: Single     Physical Exam:   /79 (BP Location: Right arm, Patient Position: Sitting, Cuff Size: Adult Large)   Pulse 74   Ht 1.595 m (5' 2.8\")   Wt 79.4 kg (175 lb)   LMP  (LMP Unknown)   SpO2 91%   BMI 31.20 kg/m     Wt Readings from Last 4 Encounters:   09/26/24 79.4 kg (175 lb)   08/16/24 83 kg (183 lb)   05/02/24 85.6 kg (188 lb 11.2 oz)   04/02/24 85.5 kg (188 lb 8 oz)     Constitutional: Well-developed, appearing stated age; cooperative.  Eyes: Normal EOM, PERRLA, vision, conjunctiva, sclera.  ENT: Normal external ears, nose, hearing, lips, teeth, gums, throat. No mucous membrane lesions.  Neck: No mass or thyroid enlargement.  Resp: Breathing unlabored  MS: Painless forward elevation 120 degrees on the right..  Left shoulder with tenderness to palpation; active flexion only to 45 degrees.  Painful internal and external rotation.  Fist formation intact, normal wrist ROM.  No visible swelling at MCPs or PIPs.  MTPs nontender.  Hands: Bony enlargement at multiple PIPs and DIPs.  Also tenderness at scattered over PIPs and MCPs.  Fist formation and complete.  Tenderness at " multiple MTPs without palpable synovitis.  Skin: No nail pitting, alopecia, rash, nodules or lesions.  Neuro: Normal cranial nerves  Psych: Normal judgement, orientation, memory, affect.    Laboratory:       Latest Ref Rng & Units 3/14/2024     1:06 PM 4/2/2024     8:28 AM 5/2/2024    12:14 PM   RHEUM RESULTS   Creatinine 0.51 - 0.95 mg/dL 0.75      CRP Inflammation <5.00 mg/L 13.50  17.50     GFR Estimate >60 mL/min/1.73m2 >90      Hepatitis C Antibody Nonreactive   Nonreactive    Sed Rate 0 - 30 mm/hr 21  21

## 2024-09-26 ENCOUNTER — LAB (OUTPATIENT)
Dept: LAB | Facility: CLINIC | Age: 54
End: 2024-09-26
Payer: COMMERCIAL

## 2024-09-26 ENCOUNTER — ANCILLARY PROCEDURE (OUTPATIENT)
Dept: GENERAL RADIOLOGY | Facility: CLINIC | Age: 54
End: 2024-09-26
Attending: INTERNAL MEDICINE
Payer: COMMERCIAL

## 2024-09-26 ENCOUNTER — OFFICE VISIT (OUTPATIENT)
Dept: RHEUMATOLOGY | Facility: CLINIC | Age: 54
End: 2024-09-26
Payer: COMMERCIAL

## 2024-09-26 VITALS
HEART RATE: 74 BPM | WEIGHT: 175 LBS | DIASTOLIC BLOOD PRESSURE: 79 MMHG | SYSTOLIC BLOOD PRESSURE: 121 MMHG | BODY MASS INDEX: 31.01 KG/M2 | OXYGEN SATURATION: 91 % | HEIGHT: 63 IN

## 2024-09-26 DIAGNOSIS — Z87.39 HISTORY OF SERONEGATIVE INFLAMMATORY ARTHRITIS: ICD-10-CM

## 2024-09-26 DIAGNOSIS — Z87.39 HISTORY OF SERONEGATIVE INFLAMMATORY ARTHRITIS: Primary | ICD-10-CM

## 2024-09-26 LAB
ALBUMIN SERPL BCG-MCNC: 3.9 G/DL (ref 3.5–5.2)
ALT SERPL W P-5'-P-CCNC: 19 U/L (ref 0–50)
AST SERPL W P-5'-P-CCNC: 28 U/L (ref 0–45)
CREAT SERPL-MCNC: 0.69 MG/DL (ref 0.51–0.95)
CRP SERPL-MCNC: 15.8 MG/L
EGFRCR SERPLBLD CKD-EPI 2021: >90 ML/MIN/1.73M2
ERYTHROCYTE [DISTWIDTH] IN BLOOD BY AUTOMATED COUNT: 15.5 % (ref 10–15)
ERYTHROCYTE [SEDIMENTATION RATE] IN BLOOD BY WESTERGREN METHOD: 14 MM/HR (ref 0–30)
HCT VFR BLD AUTO: 41.1 % (ref 35–47)
HGB BLD-MCNC: 13.7 G/DL (ref 11.7–15.7)
MCH RBC QN AUTO: 29 PG (ref 26.5–33)
MCHC RBC AUTO-ENTMCNC: 33.3 G/DL (ref 31.5–36.5)
MCV RBC AUTO: 87 FL (ref 78–100)
PLATELET # BLD AUTO: 219 10E3/UL (ref 150–450)
RBC # BLD AUTO: 4.72 10E6/UL (ref 3.8–5.2)
WBC # BLD AUTO: 6.5 10E3/UL (ref 4–11)

## 2024-09-26 PROCEDURE — 82565 ASSAY OF CREATININE: CPT

## 2024-09-26 PROCEDURE — 99214 OFFICE O/P EST MOD 30 MIN: CPT | Performed by: INTERNAL MEDICINE

## 2024-09-26 PROCEDURE — 73120 X-RAY EXAM OF HAND: CPT | Mod: TC | Performed by: STUDENT IN AN ORGANIZED HEALTH CARE EDUCATION/TRAINING PROGRAM

## 2024-09-26 PROCEDURE — G2211 COMPLEX E/M VISIT ADD ON: HCPCS | Performed by: INTERNAL MEDICINE

## 2024-09-26 PROCEDURE — 82040 ASSAY OF SERUM ALBUMIN: CPT

## 2024-09-26 PROCEDURE — 36415 COLL VENOUS BLD VENIPUNCTURE: CPT

## 2024-09-26 PROCEDURE — 85027 COMPLETE CBC AUTOMATED: CPT

## 2024-09-26 PROCEDURE — 86140 C-REACTIVE PROTEIN: CPT

## 2024-09-26 PROCEDURE — 85652 RBC SED RATE AUTOMATED: CPT

## 2024-09-26 PROCEDURE — 84450 TRANSFERASE (AST) (SGOT): CPT

## 2024-09-26 PROCEDURE — 84460 ALANINE AMINO (ALT) (SGPT): CPT

## 2024-09-26 RX ORDER — CELECOXIB 100 MG/1
100 CAPSULE ORAL 2 TIMES DAILY
Qty: 60 CAPSULE | Refills: 0 | Status: SHIPPED | OUTPATIENT
Start: 2024-09-26

## 2024-09-26 ASSESSMENT — PAIN SCALES - GENERAL: PAINLEVEL: SEVERE PAIN (6)

## 2024-09-26 NOTE — PROGRESS NOTES
DISCHARGE  Reason for Discharge: Patient has failed to schedule further appointments.    Equipment Issued:     Discharge Plan: Patient to continue home program.    Referring Provider:  Philip Rodriguez   07/02/24 0500   Appointment Info   Signing clinician's name / credentials Danni Rodriguez PT Cert MDT   Total/Authorized Visits 12   Visits Used 7   Medical Diagnosis S/P cervical spinal fusion   PT Tx Diagnosis decreased ROM, impaired posture, decreased strength, decreased function   Quick Adds Certification   Progress Note/Certification   Start of Care Date 04/03/24   Onset of illness/injury or Date of Surgery 02/12/24   Therapy Frequency 1 x week   Predicted Duration 12 weeks   Certification date from 06/27/24   Certification date to 08/01/24   Progress Note Due Date 05/17/24   GOALS   PT Goals 2   PT Goal 1   Goal Identifier posture   Goal Description will demonstrate good sitting posture with use of lumbar roll 100% of the time   Rationale to maximize safety and independence with transportation;to maximize safety and independence within the community   Goal Progress had been able to do some walking with dogs with poor posture;   Target Date 08/01/24   PT Goal 2   Goal Identifier driving   Goal Description patient will demonstrate adequate range of motion in neck/trunk to safely scan traffic painfree.   Goal Progress able to scan traffic; having more trouble in mid back twisting so uses low back   Target Date 06/26/24   Date Met 06/11/24   Subjective Report   Subjective Report neck is better but having increasing mid back pain R sided; this limits her standing posture; worse after lying down; sleeping elevated;   Objective Measures   Objective Measures Objective Measure 1;Objective Measure 2;Objective Measure 3;Objective Measure 4   Objective Measure 1   Objective Measure Cx ROM   Details R Rot=40, L Rot=55 Ext=45, R SB=20, L SB=35   Objective Measure 2   Objective Measure soft tissue tightness/TP    Details present in L shoulder teres minor, infraspinatus, Lats, UT   Objective Measure 3   Objective Measure posture   Details able to achieve neutral without pain today   Objective Measure 4   Objective Measure THX ROM   Details R Rot=min mod loss erp, L Rot=mod loss EXT=mod to major loss P familiar pain   Treatment Interventions (PT)   Interventions Neuromuscular Re-education;Therapeutic Procedure/Exercise;Manual Therapy;Therapeutic Activity   Therapeutic Procedure/Exercise   Therapeutic Procedures: strength, endurance, ROM, flexibility minutes (50508) 40   Therapeutic Procedures Ther Proc 2;Ther Proc 3;Ther Proc 4;Ther Proc 5;Ther Proc 6   Ther Proc 1 NuStep   Ther Proc 1 - Details L4, SH8   Ther Proc 2 counter stretch   Ther Proc 2 - Details x 10   Ther Proc 3 row   Ther Proc 3 - Details red tband x 15   Ther Proc 4 counter push ups   Ther Proc 4 - Details x 10 vc for head position   Ther Proc 5 education   Ther Proc 5 - Details reviewing HEP today and holding exercises for next few days to see repsonse   Ther Proc 6 THX ROM   Ther Proc 6 - Details 3 x 10 P/NW/incr ROM   PTRx Ther Proc 1 Neutral Spine Slouch Overcorrect   PTRx Ther Proc 1 - Details mc for head position x 10    PTRx Ther Proc 2 Supine Cervical Retraction   PTRx Ther Proc 2 - Details mc for techniques vc for end range; 2 x 10;    PTRx Ther Proc 3 Cervical Isometric Side Bending   PTRx Ther Proc 3 - Details x 5/hold 5 sec both directions; vc to retract some while performing   PTRx Ther Proc 4 Cervical ROM Side Bending   PTRx Ther Proc 4 - Details x 10   PTRx Ther Proc 5 Cervical ROM Rotation   PTRx Ther Proc 5 - Details x 10   PTRx Ther Proc 6 Counter Stretch   PTRx Ther Proc 6 - Details x 10 R arm only   PTRx Ther Proc 7 Shoulder Flexion Stretch with Stick   PTRx Ther Proc 7 - Details x 10 vc for exhale on reach w/rib depression   PTRx Ther Proc 8 Supine Shoulder External Rotation   PTRx Ther Proc 8 - Details yellow tband x 20   PTRx Ther Proc 9  Shoulder Theraband Low Row/Pulldown   PTRx Ther Proc 9 - Details red tband x 20   PTRx Ther Proc 10 Scapular Retraction/Depression   PTRx Ther Proc 10 - Details No Notes   PTRx Ther Proc 11 Hooklying Lumbar Rotation   PTRx Ther Proc 11 - Details x 10/side   Skilled Intervention to restore normal spinal mechanics   Patient Response/Progress adalid well   PTRx Ther Proc 12 Ball Stabilization Prone Single Leg Extension   PTRx Ther Proc 12 - Details x 10/side   PTRx Ther Proc 13 Ball Stabilization Prone Alternating Arm Extension   PTRx Ther Proc 13 - Details x 10 /side   PTRx Ther Proc 14 Seated Long Arc Quad on Ball   PTRx Ther Proc 14 - Details supported SBA x 10/side   PTRx Ther Proc 15 Ball Exercise Seated Marching   PTRx Ther Proc 15 - Details arm reach heel lift x 10/side   Therapeutic Activity   Therapeutic Activities Ther Act 2   Ther Act 1 education   Ther Act 1 - Details discussed HEP and holding exercises to assess effect on stiffness.   Ther Act 2 sleeping posture   Ther Act 2 - Details discussed sleeping less upright, more lying down as able with exercise   PTRx Ther Act 1 Posture Correction with Lumbar Roll   PTRx Ther Act 1 - Details No Notes   Neuromuscular Re-education   Neuro Re-ed 1 education in posture   Neuro Re-ed 1 - Details established POC, use of lumbar roll, traffic light analogy for symptom management   PTRx Neuro Re-ed 1 Shoulder Theraband Rows   PTRx Neuro Re-ed 1 - Details red tband x 20walkouts x 30side pulls x 30   Skilled Intervention neutral spine to decrease pain   Patient Response/Progress adalid well   Manual Therapy   Manual Therapy 1 TP release   Manual Therapy 1 - Details L infraspinatus, teres minor, UT   Education   Learner/Method Patient;No Barriers to Learning   Education Comments established POC, use of lumbar roll, traffic light analogy for symptom management   Plan   Home program PTRX, POC   Total Session Time   Timed Code Treatment Minutes 40   Total Treatment Time (sum of timed  and untimed services) 40

## 2024-09-26 NOTE — PATIENT INSTRUCTIONS
Diagnosis:  1.  Inflammatory arthritis: Small joint symptoms in the hands and feet remain improved, but morning stiffness remains.  I recommend checking blood work, x-rays, and continuing hydroxychloroquin.  2.  Osteoarthritis, rotator cuff disease, left left more so than right shoulder: I agree with injections, referral to orthopedic surgery.  4.  Pseudogout, right shoulder: No recurrence of inflammatory symptoms.    Plan:  1. Continue hydroxychloroquine 200 mg twice a day.While using hydroxychloroquine, undergo annual examination of the retina for rare retinal toxicity.  First ophthalmology visit may occur in late 2024.  2.  Check creatinine, complete blood count, liver function, inflammation today and once every 6 months while using hydroxychloroquine.  X-rays of the hands today.  3.  Make a trial of celecoxib 100 mg twice daily for joint pain for the next 2 weeks.  If significant relief of hand arthritis pain, reduce celecoxib to find the minimum effective dose.  Example: Reduce celecoxib to once daily dosing, or to dosing 3 days/week.  4.  Use acetaminophen up to 1000 mg 4 times a day, and celecoxib 100 mg once or twice daily.

## 2024-09-26 NOTE — NURSING NOTE
"Chief Complaint   Patient presents with    RECHECK     History of seronegative inflammatory arthritis       Vitals:    09/26/24 1223   BP: 121/79   BP Location: Right arm   Patient Position: Sitting   Cuff Size: Adult Large   Pulse: 74   SpO2: 91%   Weight: 79.4 kg (175 lb)   Height: 1.595 m (5' 2.8\")       Body mass index is 31.2 kg/m .      GENA Cantor    "

## 2024-09-27 ENCOUNTER — OFFICE VISIT (OUTPATIENT)
Dept: ORTHOPEDICS | Facility: CLINIC | Age: 54
End: 2024-09-27
Payer: COMMERCIAL

## 2024-09-27 DIAGNOSIS — M19.012 PRIMARY OSTEOARTHRITIS OF LEFT SHOULDER: Primary | ICD-10-CM

## 2024-09-27 PROCEDURE — 20611 DRAIN/INJ JOINT/BURSA W/US: CPT | Mod: LT | Performed by: FAMILY MEDICINE

## 2024-09-27 RX ORDER — TRIAMCINOLONE ACETONIDE 40 MG/ML
40 INJECTION, SUSPENSION INTRA-ARTICULAR; INTRAMUSCULAR
Status: SHIPPED | OUTPATIENT
Start: 2024-09-27

## 2024-09-27 RX ORDER — LIDOCAINE HYDROCHLORIDE 10 MG/ML
4 INJECTION, SOLUTION EPIDURAL; INFILTRATION; INTRACAUDAL; PERINEURAL
Status: SHIPPED | OUTPATIENT
Start: 2024-09-27

## 2024-09-27 RX ORDER — LIDOCAINE HYDROCHLORIDE 10 MG/ML
3 INJECTION, SOLUTION EPIDURAL; INFILTRATION; INTRACAUDAL; PERINEURAL
Status: SHIPPED | OUTPATIENT
Start: 2024-09-27

## 2024-09-27 RX ADMIN — LIDOCAINE HYDROCHLORIDE 3 ML: 10 INJECTION, SOLUTION EPIDURAL; INFILTRATION; INTRACAUDAL; PERINEURAL at 07:10

## 2024-09-27 RX ADMIN — TRIAMCINOLONE ACETONIDE 40 MG: 40 INJECTION, SUSPENSION INTRA-ARTICULAR; INTRAMUSCULAR at 07:10

## 2024-09-27 RX ADMIN — LIDOCAINE HYDROCHLORIDE 4 ML: 10 INJECTION, SOLUTION EPIDURAL; INFILTRATION; INTRACAUDAL; PERINEURAL at 07:10

## 2024-09-27 NOTE — LETTER
2024      RE: Zayra Ramirez  [address] [place] [address]     Dear Colleague,    Thank you for referring your patient, Zayra Ramirez, to the [facility] [place]. Please see a copy of my visit note below.    PROCEDURE ENCOUNTER    [facility]  Orthopedics  Parveen James DO  2024     Name: Zayra Ramirez  MRN: [id number]  Age: 54 year old  : 1970    Referring provider: Dr. Doreen Haas  Diagnosis: Glenohumeral osteoarthritis of left shoulder    Glenohumeral Injection - Ultrasound Guided  The patient was informed of the risks and the benefits of the procedure and a written consent was signed.  The patient s left shoulder was prepped with chlorhexidine in sterile fashion.   Local anesthesia was performed using a 27-gauge 1.5-inch needle to administer 3 mL of 1% lidocaine without epi.  40 mg of triamcinolone suspension was drawn up into a 5 mL syringe with 3 mL of 1% lidocaine w/o Epi.  Injection was performed using sterile technique.  Under ultrasound guidance a 3.5-inch 22-gauge needle was used to enter the left glenohumeral joint.  Posterior approach was used with the patient in lateral recumbent position, arm in neutral position at the side.  Needle placement was visualized and documented with ultrasound.  Ultrasound visualization was necessary due to the small joint space entered.  Injection performed long axis to the probe.  Injection solution visualized within the joint space.  Images were permanently stored for the patient's record.  There were no complications. The patient tolerated the procedure well. There was negligible bleeding.   Therapy scheduled to follow for mobilization.  The patient was instructed to call or go to the emergency room with any unusual pain, swelling, redness, or if otherwise concerned.  Parveen James DO CAQSM  Primary Care Sports Medicine  [facility]          Large Joint  Injection/Arthocentesis: L glenohumeral joint    Date/Time: 9/27/2024 7:10 AM    Performed by: Parveen James DO  Authorized by: Parveen James DO    Indications:  Osteoarthritis  Needle Size:  22 G  Guidance: ultrasound    Approach:  Posterior  Location:  Shoulder      Site:  L glenohumeral joint  Medications:  40 mg triamcinolone 40 MG/ML; 3 mL lidocaine (PF) 1 %; 4 mL lidocaine (PF) 1 %  Outcome:  Tolerated well, no immediate complications  Procedure discussed: discussed risks, benefits, and alternatives    Consent Given by:  Patient  Timeout: timeout called immediately prior to procedure    Prep: patient was prepped and draped in usual sterile fashion          Again, thank you for allowing me to participate in the care of your patient.      Sincerely,    Parveen James DO

## 2024-09-27 NOTE — PROGRESS NOTES
PROCEDURE ENCOUNTER    [facility]  Orthopedics  Parveen James DO  2024     Name: Zayra Ramirez  MRN: [id number]  Age: 54 year old  : 1970    Referring provider: Dr. Doreen Haas  Diagnosis: Glenohumeral osteoarthritis of left shoulder    Glenohumeral Injection - Ultrasound Guided  The patient was informed of the risks and the benefits of the procedure and a written consent was signed.  The patient s left shoulder was prepped with chlorhexidine in sterile fashion.   Local anesthesia was performed using a 27-gauge 1.5-inch needle to administer 3 mL of 1% lidocaine without epi.  40 mg of triamcinolone suspension was drawn up into a 5 mL syringe with 3 mL of 1% lidocaine w/o Epi.  Injection was performed using sterile technique.  Under ultrasound guidance a 3.5-inch 22-gauge needle was used to enter the left glenohumeral joint.  Posterior approach was used with the patient in lateral recumbent position, arm in neutral position at the side.  Needle placement was visualized and documented with ultrasound.  Ultrasound visualization was necessary due to the small joint space entered.  Injection performed long axis to the probe.  Injection solution visualized within the joint space.  Images were permanently stored for the patient's record.  There were no complications. The patient tolerated the procedure well. There was negligible bleeding.   Therapy scheduled to follow for mobilization.  The patient was instructed to call or go to the emergency room with any unusual pain, swelling, redness, or if otherwise concerned.  Parveen James DO CAQSM  Primary Care Sports Medicine  [facility]

## 2024-09-27 NOTE — PROGRESS NOTES
Large Joint Injection/Arthocentesis: L glenohumeral joint    Date/Time: 9/27/2024 7:10 AM    Performed by: Parveen James DO  Authorized by: Parveen James DO    Indications:  Osteoarthritis  Needle Size:  22 G  Guidance: ultrasound    Approach:  Posterior  Location:  Shoulder      Site:  L glenohumeral joint  Medications:  40 mg triamcinolone 40 MG/ML; 3 mL lidocaine (PF) 1 %; 4 mL lidocaine (PF) 1 %  Outcome:  Tolerated well, no immediate complications  Procedure discussed: discussed risks, benefits, and alternatives    Consent Given by:  Patient  Timeout: timeout called immediately prior to procedure    Prep: patient was prepped and draped in usual sterile fashion

## 2024-09-27 NOTE — NURSING NOTE
[address] [address]  Dept: [phone]  ______________________________________________________________________________    Patient: Zayra Ramirez   : 1970   MRN: [id number]   2024    INVASIVE PROCEDURE SAFETY CHECKLIST    Date: 2024   Procedure:Left GH USG CSI   Patient Name: Zyara Ramirez  MRN: [id number]  YOB: 1970    Action: Complete sections as appropriate. Any discrepancy results in a HARD COPY until resolved.     PRE PROCEDURE:  Patient ID verified with 2 identifiers (name and  or MRN): Yes  Procedure and site verified with patient/designee (when able): Yes  Accurate consent documentation in medical record: Yes  H&P (or appropriate assessment) documented in medical record: Yes  H&P must be up to 20 days prior to procedure and updates within 24 hours of procedure as applicable: Yes  Relevant diagnostic and radiology test results appropriately labeled and displayed as applicable: Yes  Procedure site(s) marked with provider initials: NA    TIMEOUT:  Time-Out performed immediately prior to starting procedure, including verbal and active participation of all team members addressing the following:Yes  * Correct patient identify  * Confirmed that the correct side and site are marked  * An accurate procedure consent form  * Agreement on the procedure to be done  * Correct patient position  * Relevant images and results are properly labeled and appropriately displayed  * The need to administer antibiotics or fluids for irrigation purposes during the procedure as applicable   * Safety precautions based on patient history or medication use    DURING PROCEDURE: Verification of correct person, site, and procedures any time the responsibility for care of the patient is transferred to another member of the care team.       Prior to injection, verified patient identity using patient's name and date of  birth.  Due to injection administration, patient instructed to remain in clinic for 15 minutes  afterwards, and to report any adverse reaction to me immediately.    Joint injection was performed.      Drug Amount Wasted:  None.  Vial/Syringe: Single dose vial  Expiration Date:  6/1/26 4/1/28      Cherelle Loera ATC  September 27, 2024

## 2024-10-11 ASSESSMENT — SLEEP AND FATIGUE QUESTIONNAIRES
HOW LIKELY ARE YOU TO NOD OFF OR FALL ASLEEP WHILE LYING DOWN TO REST IN THE AFTERNOON WHEN CIRCUMSTANCES PERMIT: MODERATE CHANCE OF DOZING
HOW LIKELY ARE YOU TO NOD OFF OR FALL ASLEEP WHILE WATCHING TV: HIGH CHANCE OF DOZING
HOW LIKELY ARE YOU TO NOD OFF OR FALL ASLEEP WHILE SITTING QUIETLY AFTER LUNCH WITHOUT ALCOHOL: MODERATE CHANCE OF DOZING
HOW LIKELY ARE YOU TO NOD OFF OR FALL ASLEEP IN A CAR, WHILE STOPPED FOR A FEW MINUTES IN TRAFFIC: WOULD NEVER DOZE
HOW LIKELY ARE YOU TO NOD OFF OR FALL ASLEEP WHILE SITTING AND TALKING TO SOMEONE: SLIGHT CHANCE OF DOZING
HOW LIKELY ARE YOU TO NOD OFF OR FALL ASLEEP WHEN YOU ARE A PASSENGER IN A CAR FOR AN HOUR WITHOUT A BREAK: SLIGHT CHANCE OF DOZING
HOW LIKELY ARE YOU TO NOD OFF OR FALL ASLEEP WHILE SITTING AND READING: HIGH CHANCE OF DOZING
HOW LIKELY ARE YOU TO NOD OFF OR FALL ASLEEP WHILE SITTING INACTIVE IN A PUBLIC PLACE: SLIGHT CHANCE OF DOZING

## 2024-10-16 ENCOUNTER — VIRTUAL VISIT (OUTPATIENT)
Dept: SLEEP MEDICINE | Facility: CLINIC | Age: 54
End: 2024-10-16
Payer: COMMERCIAL

## 2024-10-16 VITALS — WEIGHT: 180 LBS | BODY MASS INDEX: 30.73 KG/M2 | HEIGHT: 64 IN

## 2024-10-16 DIAGNOSIS — G25.81 RESTLESS LEG SYNDROME: Primary | ICD-10-CM

## 2024-10-16 PROCEDURE — 99213 OFFICE O/P EST LOW 20 MIN: CPT | Mod: 95

## 2024-10-16 ASSESSMENT — PAIN SCALES - GENERAL: PAINLEVEL: EXTREME PAIN (8)

## 2024-10-16 NOTE — PROGRESS NOTES
Virtual Visit Details  Type of service: Video Visit   Video Start Time: 3:27 PM  Video End Time: 3:34 PM  Originating Location (pt. Location): Other sitting in her car  Distant Location (provider location): On-site  Platform used for Video Visit: Rosey    Sleep Apnea - Follow-up Visit:    Impression/Plan:  (G25.81) Restless leg syndrome (primary encounter diagnosis)  Comment: Zayra Ramirez presents for her follow-up visit today to review her medication management for restless legs syndrome. Zayra takes 150 mg pregabalin in the morning and afternoon and 0.5 mg ropinirole at bedtime for management of her restless legs. As long as she takes her medications right on time, her restless legs are very well controlled. She was taking the pregabalin at night, but she was having trouble sleeping as she feels it gives her more energy during the day. She had been previously getting symptoms of restlessness in the arms and legs in the daytime. Moving the pregabalin earlier has taken care of that. Her last ferritin on file was 149 ng/mL in 9/2020. She just restarted taking iron with Vit C about every day or two (was not tolerated daily in the past). It has not been causing GI upset at this interval. She denies any medications side effects. No worsening depression, weight gain, or compulsive behaviors. She does have mild swelling in her legs that she says is not from the pregabalin and was present before starting this medication.     Plan: Zayra is not due for any refills at this time. She will request refills from Bennett Goltz, PA-C when needed. Her pregabalin was last filled on 09/16/2024. She has two refills remaining. Her ropinirole is due to be filled in the next couple days. Of note, Zayra is on CPAP 11.0 cmH2O with 3 lpm O2. She follows with Dr. Bonilla at the [facility] yearly for her sleep disordered breathing.     Zayra Ramirez will follow up in about 1 year(s).     Total time spent  reviewing medical records, history and physical examination, review of previous testing and interpretation as well as documentation on this date: 20 minutes     CC: Neena Tam APRN CNP    History of Present Illness:  Chief Complaint   Patient presents with    RECHECK     Zayra Ramirez presents for her follow-up visit today to review her medication management for restless legs syndrome. Zayra takes 150 mg pregabalin in the morning and afternoon and 0.5 mg ropinirole at bedtime for management of her restless legs. She was taking the pregabalin at night, but she was having trouble sleeping as she feels it gives her more energy during the day. She had been previously getting symptoms of restlessness in the arms and legs in the daytime. Moving the pregabalin earlier has taken care of that. Her last ferritin on file was 149 ng/mL in 9/2020. She just restarted taking iron with Vit C about every day or two (was not tolerated daily in the past). It has not been causing GI upset at that interval.      As long as she takes her medications right on time, her restless legs are very well controlled.     She has not noticed any worsening depression. She has mild swelling in her legs that she reports she has always had. Her weight has been stable. She has not noticed any compulsive behaviors with ropinirole.       reviewed on 10/16/2024. Her pregabalin was last filled on 09/16/2024. She has two refills remaining.     She is on CPAP 11.0 cmH2O with 3 lpm O2. She follows with Dr. Bonilla at the [facility] yearly for her sleep disordered breathing.    Do you use a CPAP Machine at home: Yes  Overall, on a scale of 0-10 how would you rate your CPAP (0 poor, 10 great):      What type of mask do you use:    Is your mask comfortable: Yes  If not, why:      Is your mask leaking: No  If yes, where do you feel it:    How many night per week does the mask leak (0-7):      Do you notice snoring with mask on:  No  Do you notice gasping arousals with mask on: No  Are you having significant oral or nasal dryness: Yes  Is the pressure setting comfortable: No  If not, why:      What is your typical bedtime: 8  How long does it take you to go to sleep on PAP therapy: 5-10 min  What time do you typically get out of bed for the day: 5  How many hours on average per night are you using PAP therapy: 9  How many hours are you sleeping per night: 9  Do you feel well rested in the morning: Yes    ResMed AirSense 10  CPAP 11.0 cmH2O 30 day usage data: 09/11/2024-10/10/2024  80% of days with > 4 hours of use. 1/30 days with no use.   Average use 6 hours 33 minutes per day.   95%ile Leak 31.4 L/min.   AHI 0.6 events per hour.     EPWORTH SLEEPINESS SCALE         10/11/2024     8:10 AM    [place] Sleepiness Scale ( JUDE [place]  [id number]<br>ESS - [place]/English - Final version - 21 Nov 07 - [facility] [place].)   Sitting and reading High chance of dozing   Watching TV High chance of dozing   Sitting, inactive in a public place (e.g. a theatre or a meeting) Slight chance of dozing   As a passenger in a car for an hour without a break Slight chance of dozing   Lying down to rest in the afternoon when circumstances permit Moderate chance of dozing   Sitting and talking to someone Slight chance of dozing   Sitting quietly after a lunch without alcohol Moderate chance of dozing   In a car, while stopped for a few minutes in traffic Would never doze   Whitehall Score (MC) 13   Whitehall Score (Sleep) 13       INSOMNIA SEVERITY INDEX (ESDRAS)          10/11/2024     8:07 AM   Insomnia Severity Index (ESDRAS)   Difficulty falling asleep 0   Difficulty staying asleep 1   Problems waking up too early 1   How SATISFIED/DISSATISFIED are you with your CURRENT sleep pattern? 1   How NOTICEABLE to others do you think your sleep problem is in terms of impairing the quality of your life? 1   How WORRIED/DISTRESSED are you about your current sleep problem? 1   To  what extent do you consider your sleep problem to INTERFERE with your daily functioning (e.g. daytime fatigue, mood, ability to function at work/daily chores, concentration, memory, mood, etc.) CURRENTLY? 1   ESDRAS Total Score 6       Guidelines for Scoring/Interpretation:  Total score categories:  0-7 = No clinically significant insomnia   8-14 = Subthreshold insomnia   15-21 = Clinical insomnia (moderate severity)  22-28 = Clinical insomnia (severe)  Used via courtesy of www.[employer].[place].gov with permission from Gonzalez Ayala PhD., [facility]      Past medical/surgical history, family history, social history, medications and allergies were reviewed.        Problem List:  Patient Active Problem List    Diagnosis Date Noted    Class 1 obesity with serious comorbidity and body mass index (BMI) of 32.0 to 32.9 in adult 08/08/2015     Priority: High    Cannabis abuse, daily use 09/20/2023     Priority: Medium    S/P spinal fusion 11/30/2022     Priority: Medium    History of pelvic surgery 10/27/2022     Priority: Medium    Voiding dysfunction 10/27/2022     Priority: Medium    Pelvic floor dysfunction 10/27/2022     Priority: Medium    Urinary hesitancy 10/27/2022     Priority: Medium    Urinary frequency 10/27/2022     Priority: Medium    Other osteoporosis without current pathological fracture 08/31/2022     Priority: Medium    Acute pain of right shoulder 07/13/2022     Priority: Medium    Cervical radiculopathy 09/21/2021     Priority: Medium     Added automatically from request for surgery [id number]      Lumbar radiculopathy, chronic 06/15/2021     Priority: Medium     Added automatically from request for surgery [id number]      Chronic neck pain 05/19/2021     Priority: Medium    Glucose intolerance 04/10/2021     Priority: Medium    Degenerative lumbar spinal stenosis 08/31/2020     Priority: Medium     Added automatically from request for surgery [id number]      Sacro-iliac pain 06/09/2020     Priority: Medium      Added automatically from request for surgery [id number]      Calculus of gallbladder without cholecystitis without obstruction 01/21/2020     Priority: Medium    Symptomatic cholelithiasis 01/21/2020     Priority: Medium    Limited opening of mandible 01/14/2020     Priority: Medium    Inflammatory arthritis 11/27/2019     Priority: Medium    Lumbar spondylosis 11/04/2019     Priority: Medium     Added automatically from request for surgery [id number]      Spinal epidural abscess 08/19/2019     Priority: Medium    Respiratory bronchiolitis interstitial lung disease (H)      Priority: Medium    Subcutaneous emphysema (H) 04/05/2018     Priority: Medium    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) 10/13/2017     Priority: Medium    Oral lesion 02/14/2017     Priority: Medium    Interstitial lung disease (H) 11/17/2016     Priority: Medium     Overview:   Patient sees Pulm    Formatting of this note might be different from the original.  Patient sees Pulm      Derangement of temporomandibular joint 11/15/2016     Priority: Medium    Displacement of articular disc of temporomandibular joint with reduction 11/15/2016     Priority: Medium    Arthralgia of temporomandibular joint 09/20/2016     Priority: Medium    Articular disc disorder of temporomandibular joint 09/20/2016     Priority: Medium    Myofascial pain 09/20/2016     Priority: Medium    Esophageal stricture 07/18/2016     Priority: Medium     Overview:   With Hiatal hernia; on Acid blocker lifelong    Formatting of this note might be different from the original.  With Hiatal hernia; on Acid blocker lifelong      Diaphragmatic hernia 07/11/2016     Priority: Medium    Gastro-esophageal reflux disease with esophagitis 07/11/2016     Priority: Medium    Choking sensation 06/22/2016     Priority: Medium    Stress 06/15/2016     Priority: Medium    Hypoxia 06/08/2016     Priority: Medium    Onychomycosis 06/01/2016     Priority: Medium    Restless leg syndrome  "06/01/2016     Priority: Medium    Dental abscess 08/08/2015     Priority: Medium    Facial cellulitis 03/05/2015     Priority: Medium    Chronic bilateral low back pain without sciatica 11/10/2014     Priority: Medium     Diagnosis updated by automated process. Provider to review and confirm.      HTN (hypertension) 01/28/2013     Priority: Medium    Borderline personality disorder (H) 01/12/2011     Priority: Medium    Obstruction of esophagus 03/23/2010     Priority: Medium    Moderate persistent asthma without complication 03/23/2010     Priority: Medium     Overview:   Patient sees Pulm    Formatting of this note might be different from the original.  Patient sees Pulm      Seasonal allergies 03/23/2010     Priority: Medium    Bipolar 2 disorder (H) 03/23/2010     Priority: Medium     Formatting of this note might be different from the original.  Sees Psych          Ht 1.626 m (5' 4\")   Wt 81.6 kg (180 lb)   LMP  (LMP Unknown)   BMI 30.90 kg/m      NICKO Gallagher CNP  "

## 2024-10-16 NOTE — NURSING NOTE
Current patient location:  [facility]     Is the patient currently in the state of [place]? YES    Visit mode:VIDEO    If the visit is dropped, the patient can be reconnected by: VIDEO VISIT: Text to cell phone:   Telephone Information:   Mobile [phone]       Will anyone else be joining the visit? NO  (If patient encounters technical issues they should call [phone]    Are changes needed to the allergy or medication list? Pt stated no med changes    Are refills needed on medications prescribed by this physician? NO    Rooming Documentation:  Questionnaire(s) completed    Reason for visit: RECHECK    Belem CAINF

## 2024-10-18 ENCOUNTER — OFFICE VISIT (OUTPATIENT)
Dept: ORTHOPEDICS | Facility: CLINIC | Age: 54
End: 2024-10-18
Payer: COMMERCIAL

## 2024-10-18 DIAGNOSIS — M19.012 PRIMARY OSTEOARTHRITIS OF LEFT SHOULDER: Primary | ICD-10-CM

## 2024-10-18 PROCEDURE — 99214 OFFICE O/P EST MOD 30 MIN: CPT | Performed by: FAMILY MEDICINE

## 2024-10-18 NOTE — PROGRESS NOTES
ESTABLISHED PATIENT FOLLOW-UP:  Follow Up of the Left Shoulder       HISTORY OF PRESENT ILLNESS  Ms. Ramirez is a pleasant 54 year old year old female who presents to clinic today for follow-up of  left shoulder pain    Date of injury/onset: Chronic  Date last seen: 9/27/24  Following Therapeutic Plan: Yes  Pain: 7/10  Function: unchanged  Interval History: Patient reports that she is not sure that the left shoulder injection on 9/27/24 didn't provide much pain relief. She said that she really only noticed improvement for 2-3 days. She still has 7/10 pain.      Zayra has tried physical therapy before.    Spine Surgical Hx: History of ACDF C3-C5 fusion, additional PI SF C2-C5, laminectomies at C3 and C4 with Dr. Krishnan on 2/12/2024.  She notes that her cervical spine is doing well, but continues to experience some numbness/tingling of her right upper extremity never affected her left shoulder or left upper extremity.    MEDICAL HISTORY  Patient Active Problem List   Diagnosis    Chronic bilateral low back pain without sciatica    Facial cellulitis    Class 1 obesity with serious comorbidity and body mass index (BMI) of 32.0 to 32.9 in adult    Dental abscess    Hypoxia    Subcutaneous emphysema (H)    Borderline personality disorder (H)    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)    Esophageal stricture    Obstruction of esophagus    HTN (hypertension)    Interstitial lung disease (H)    Moderate persistent asthma without complication    Onychomycosis    Restless leg syndrome    Seasonal allergies    Stress    Respiratory bronchiolitis interstitial lung disease (H)    Spinal epidural abscess    Lumbar spondylosis    Inflammatory arthritis    Arthralgia of temporomandibular joint    Articular disc disorder of temporomandibular joint    Derangement of temporomandibular joint    Calculus of gallbladder without cholecystitis without obstruction    Displacement of articular disc of temporomandibular joint with  reduction    Myofascial pain    Oral lesion    Symptomatic cholelithiasis    Sacro-iliac pain    Degenerative lumbar spinal stenosis    Glucose intolerance    Chronic neck pain    Lumbar radiculopathy, chronic    Cervical radiculopathy    Acute pain of right shoulder    Other osteoporosis without current pathological fracture    Diaphragmatic hernia    Bipolar 2 disorder (H)    Choking sensation    Gastro-esophageal reflux disease with esophagitis    Limited opening of mandible    History of pelvic surgery    Voiding dysfunction    Pelvic floor dysfunction    Urinary hesitancy    Urinary frequency    S/P spinal fusion    Cannabis abuse, daily use       Current Outpatient Medications   Medication Sig Dispense Refill    acetaminophen (TYLENOL) 500 MG tablet Take 1-2 tablets (500-1,000 mg) by mouth every 6 hours as needed for mild pain 100 tablet 1    albuterol (PROAIR HFA/PROVENTIL HFA/VENTOLIN HFA) 108 (90 Base) MCG/ACT inhaler Inhale 2 puffs into the lungs every 6 hours as needed for shortness of breath / dyspnea or wheezing Ventolin please 18 g 2    albuterol (PROVENTIL) (2.5 MG/3ML) 0.083% neb solution INHALE 3 ML VIA A NEBULIZER 4 TIMES DAILY IF NEEDED.      ARIPiprazole (ABILIFY) 5 MG tablet Take 5 mg by mouth every morning      azelastine (ASTELIN) 0.1 % nasal spray Spray 2 sprays in nostril 2 times daily      busPIRone HCl (BUSPAR) 30 MG tablet Take 30 mg by mouth 2 times daily       celecoxib (CELEBREX) 100 MG capsule Take 1 capsule (100 mg) by mouth 2 times daily. As needed for joint pain 60 capsule 0    cloNIDine (CATAPRES) 0.1 MG tablet Take 1 tablet by mouth as needed      cyclobenzaprine (FLEXERIL) 10 MG tablet TAKE 1 TABLET BY MOUTH EVERYDAY AT BEDTIME 90 tablet 1    DULoxetine (CYMBALTA) 60 MG capsule Take 120 mg by mouth At Bedtime       EPINEPHrine (ANY BX GENERIC EQUIV) 0.3 MG/0.3ML injection 2-pack Inject 0.3 mLs (0.3 mg) into the muscle as needed for anaphylaxis May repeat one time in 5-15 minutes  if response to initial dose is inadequate. 2 each 1    hydroxychloroquine (PLAQUENIL) 200 MG tablet Take 1 tablet (200 mg) by mouth 2 times daily 180 tablet 3    insulin pen needle (32G X 4 MM) 32G X 4 MM miscellaneous Use 1 NEEDLE WEEKLY 12 each 3    Lidocaine (LIDOCARE) 4 % Patch Place 1 patch onto the skin every 24 hours To prevent lidocaine toxicity, patient should be patch free for 12 hrs daily.Do not place directly over the incision. 15 patch 3    liraglutide - Weight Management (SAXENDA) 18 MG/3ML pen INJECT 3 MG SUBCUTANEOUS DAILY FOR 90 DAYS 45 mL 0    methocarbamol (ROBAXIN) 750 MG tablet Take 1 tablet (750 mg) by mouth 3 times daily 35 tablet 2    montelukast (SINGULAIR) 10 MG tablet Take 1 tablet by mouth At Bedtime      naloxone (NARCAN) 4 MG/0.1ML nasal spray Spray 1 spray (4 mg) into one nostril alternating nostrils as needed for opioid reversal every 2-3 minutes until assistance arrives 0.2 mL 0    omeprazole (PRILOSEC) 40 MG DR capsule TAKE 1 CAPSULE BY MOUTH EVERY DAY (Patient taking differently: Take 40 mg by mouth every evening.) 90 capsule 3    OXYGEN-HELIUM IN 3L @ night    Oxygen only not helium      polyethylene glycol (MIRALAX) 17 GM/Dose powder Take 17 g by mouth daily      predniSONE 5 MG (21) TBPK Take 5 mg by mouth daily      pregabalin (LYRICA) 150 MG capsule Take 1 capsule by mouth in the morning and around 1 PM 60 capsule 5    pregabalin (LYRICA) 150 MG capsule Take 1 capsule by mouth in the morning and around 1 PM 60 capsule 3    Respiratory Therapy Supplies ([facility] CPAP FILTER) MISC       rOPINIRole (REQUIP) 0.5 MG tablet Take 1 tablet (0.5 mg) by mouth at bedtime. 90 tablet 1    SENNA-docusate sodium (SENNA S) 8.6-50 MG tablet Take 1 tablet by mouth at bedtime 30 tablet 2    STIOLTO RESPIMAT 2.5-2.5 MCG/ACT AERS Inhale 2 puffs into the lungs every morning         Allergies   Allergen Reactions    Bee Venom Anaphylaxis    Doxycycline Anaphylaxis     Patient thinks it may  have been just nausea and vomiting, however unable to confirm  Other reaction(s): throat closes    Erythromycin      Other reaction(s): Vomiting      Hydrocodone-Acetaminophen Itching       Family History   Problem Relation Age of Onset    Asthma Mother     Restless Leg Syndrome Mother     Other Cancer Father         base of tongue cancer at age ~65    Hypertension Father     Back Pain Father     Restless Leg Syndrome Father     Coronary Artery Disease Father     Diabetes Father     Depression Father     Anxiety Disorder Father     Osteoporosis Father     Restless Leg Syndrome Sister     Breast Cancer Maternal Aunt 55    Anesthesia Reaction No family hx of     Deep Vein Thrombosis (DVT) No family hx of     Thyroid Cancer No family hx of     Multiple endocrine neoplasia No family hx of        Additional medical/Social/Surgical histories [facility] and updated as appropriate.       PHYSICAL EXAM  LMP  (LMP Unknown)     General  - normal appearance, in no obvious distress  Musculoskeletal - Left shoulder  - inspection: normal bone and joint alignment, no obvious deformity, no scapular winging, no AC step-off,   - palpation: Tenderness anterior joint line, normal clavicle, non-tender AC  - ROM:  Shoulder hiking with abduction, painful and limited forward elevation 45 degrees, abduction and internal rotation. External rotation limited to 20 degrees  - strength: 5/5  strength, 4/5 shoulder strength in abduction due to pain.  5/5 ER and IR  Neuro  - no sensory or motor deficit, grossly normal coordination, normal muscle tone  Skin  - no ecchymosis, erythema, warmth, or induration, no obvious rash  Psych  - interactive, appropriate, normal mood and affect      IMAGING : X-ray left shoulder 3 views on 9/13/2024. Final results and radiologist's interpretation, available in the [facility] health record. Images were reviewed with the patient/family members in the office today. My personal interpretation of the performed  imaging is severe degenerative arthrosis of left glenohumeral joint.  Moderate AC joint DJD.    Radiology read on 9/15/2024    Impression:  1. Severe degenerative changes of the left glenohumeral joint,  markedly progressed since comparison.  2. Moderate degenerative changes of the right acromioclavicular joint.  3. Mild inferior subluxation of the right humeral head relative to  glenoid, likely reflecting presence of joint effusion.      I have personally reviewed the examination and initial interpretation  and I agree with the findings.     VIJAY RIVERA      ASSESSMENT & PLAN  Ms. Ramirez is a 54 year old year old female who presents to clinic today with Follow Up of the Left Shoulder    Diagnosis: Advanced osteoarthrosis of left glenohumeral joint    I reviewed treatment options that she has undergone which is included physical therapy, oral analgesics, corticosteroid injection and she has had no benefit to date.  We discussed the severity of her arthrosis and that she certainly would be a candidate for arthroplasty from a radiographic and clinical symptomatology perspective.  I would like to refer her onto one of our a more technical perspective.  She would be interested in considering this.  Shoulder surgeons to determine her candidacy from as an alternative I also discussed pain management for nerve blocks or RFA procedures.  She is more interested in pursuing some definitive treatment if it is available to her.    Follow-up as needed.    It was a pleasure seeing Zayra.    Parveen James DO, [facility]  Primary Care Sports Medicine

## 2024-10-18 NOTE — LETTER
10/18/2024      RE: Zayra Ramirez  [address] [place] [address]     Dear Colleague,    Thank you for referring your patient, Zayra Ramirez, to the [facility] [place]. Please see a copy of my visit note below.    ESTABLISHED PATIENT FOLLOW-UP:  Follow Up of the Left Shoulder       HISTORY OF PRESENT ILLNESS  Ms. Ramirez is a pleasant 54 year old year old female who presents to clinic today for follow-up of  left shoulder pain    Date of injury/onset: Chronic  Date last seen: 9/27/24  Following Therapeutic Plan: Yes  Pain: 7/10  Function: unchanged  Interval History: Patient reports that she is not sure that the left shoulder injection on 9/27/24 didn't provide much pain relief. She said that she really only noticed improvement for 2-3 days. She still has 7/10 pain.      Zayra has tried physical therapy before.    Spine Surgical Hx: History of ACDF C3-C5 fusion, additional PI SF C2-C5, laminectomies at C3 and C4 with Dr. Krishnan on 2/12/2024.  She notes that her cervical spine is doing well, but continues to experience some numbness/tingling of her right upper extremity never affected her left shoulder or left upper extremity.    MEDICAL HISTORY  Patient Active Problem List   Diagnosis     Chronic bilateral low back pain without sciatica     Facial cellulitis     Class 1 obesity with serious comorbidity and body mass index (BMI) of 32.0 to 32.9 in adult     Dental abscess     Hypoxia     Subcutaneous emphysema (H)     Borderline personality disorder (H)     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)     Esophageal stricture     Obstruction of esophagus     HTN (hypertension)     Interstitial lung disease (H)     Moderate persistent asthma without complication     Onychomycosis     Restless leg syndrome     Seasonal allergies     Stress     Respiratory bronchiolitis interstitial lung disease (H)     Spinal epidural abscess     Lumbar spondylosis      Inflammatory arthritis     Arthralgia of temporomandibular joint     Articular disc disorder of temporomandibular joint     Derangement of temporomandibular joint     Calculus of gallbladder without cholecystitis without obstruction     Displacement of articular disc of temporomandibular joint with reduction     Myofascial pain     Oral lesion     Symptomatic cholelithiasis     Sacro-iliac pain     Degenerative lumbar spinal stenosis     Glucose intolerance     Chronic neck pain     Lumbar radiculopathy, chronic     Cervical radiculopathy     Acute pain of right shoulder     Other osteoporosis without current pathological fracture     Diaphragmatic hernia     Bipolar 2 disorder (H)     Choking sensation     Gastro-esophageal reflux disease with esophagitis     Limited opening of mandible     History of pelvic surgery     Voiding dysfunction     Pelvic floor dysfunction     Urinary hesitancy     Urinary frequency     S/P spinal fusion     Cannabis abuse, daily use       Current Outpatient Medications   Medication Sig Dispense Refill     acetaminophen (TYLENOL) 500 MG tablet Take 1-2 tablets (500-1,000 mg) by mouth every 6 hours as needed for mild pain 100 tablet 1     albuterol (PROAIR HFA/PROVENTIL HFA/VENTOLIN HFA) 108 (90 Base) MCG/ACT inhaler Inhale 2 puffs into the lungs every 6 hours as needed for shortness of breath / dyspnea or wheezing Ventolin please 18 g 2     albuterol (PROVENTIL) (2.5 MG/3ML) 0.083% neb solution INHALE 3 ML VIA A NEBULIZER 4 TIMES DAILY IF NEEDED.       ARIPiprazole (ABILIFY) 5 MG tablet Take 5 mg by mouth every morning       azelastine (ASTELIN) 0.1 % nasal spray Spray 2 sprays in nostril 2 times daily       busPIRone HCl (BUSPAR) 30 MG tablet Take 30 mg by mouth 2 times daily        celecoxib (CELEBREX) 100 MG capsule Take 1 capsule (100 mg) by mouth 2 times daily. As needed for joint pain 60 capsule 0     cloNIDine (CATAPRES) 0.1 MG tablet Take 1 tablet by mouth as needed        cyclobenzaprine (FLEXERIL) 10 MG tablet TAKE 1 TABLET BY MOUTH EVERYDAY AT BEDTIME 90 tablet 1     DULoxetine (CYMBALTA) 60 MG capsule Take 120 mg by mouth At Bedtime        EPINEPHrine (ANY BX GENERIC EQUIV) 0.3 MG/0.3ML injection 2-pack Inject 0.3 mLs (0.3 mg) into the muscle as needed for anaphylaxis May repeat one time in 5-15 minutes if response to initial dose is inadequate. 2 each 1     hydroxychloroquine (PLAQUENIL) 200 MG tablet Take 1 tablet (200 mg) by mouth 2 times daily 180 tablet 3     insulin pen needle (32G X 4 MM) 32G X 4 MM miscellaneous Use 1 NEEDLE WEEKLY 12 each 3     Lidocaine (LIDOCARE) 4 % Patch Place 1 patch onto the skin every 24 hours To prevent lidocaine toxicity, patient should be patch free for 12 hrs daily.Do not place directly over the incision. 15 patch 3     liraglutide - Weight Management (SAXENDA) 18 MG/3ML pen INJECT 3 MG SUBCUTANEOUS DAILY FOR 90 DAYS 45 mL 0     methocarbamol (ROBAXIN) 750 MG tablet Take 1 tablet (750 mg) by mouth 3 times daily 35 tablet 2     montelukast (SINGULAIR) 10 MG tablet Take 1 tablet by mouth At Bedtime       naloxone (NARCAN) 4 MG/0.1ML nasal spray Spray 1 spray (4 mg) into one nostril alternating nostrils as needed for opioid reversal every 2-3 minutes until assistance arrives 0.2 mL 0     omeprazole (PRILOSEC) 40 MG DR capsule TAKE 1 CAPSULE BY MOUTH EVERY DAY (Patient taking differently: Take 40 mg by mouth every evening.) 90 capsule 3     OXYGEN-HELIUM IN 3L @ night    Oxygen only not helium       polyethylene glycol (MIRALAX) 17 GM/Dose powder Take 17 g by mouth daily       predniSONE 5 MG (21) TBPK Take 5 mg by mouth daily       pregabalin (LYRICA) 150 MG capsule Take 1 capsule by mouth in the morning and around 1 PM 60 capsule 5     pregabalin (LYRICA) 150 MG capsule Take 1 capsule by mouth in the morning and around 1 PM 60 capsule 3     Respiratory Therapy Supplies ([facility] CPAP FILTER) MISC        rOPINIRole (REQUIP) 0.5 MG tablet  Take 1 tablet (0.5 mg) by mouth at bedtime. 90 tablet 1     SENNA-docusate sodium (SENNA S) 8.6-50 MG tablet Take 1 tablet by mouth at bedtime 30 tablet 2     STIOLTO RESPIMAT 2.5-2.5 MCG/ACT AERS Inhale 2 puffs into the lungs every morning         Allergies   Allergen Reactions     Bee Venom Anaphylaxis     Doxycycline Anaphylaxis     Patient thinks it may have been just nausea and vomiting, however unable to confirm  Other reaction(s): throat closes     Erythromycin      Other reaction(s): Vomiting       Hydrocodone-Acetaminophen Itching       Family History   Problem Relation Age of Onset     Asthma Mother      Restless Leg Syndrome Mother      Other Cancer Father         base of tongue cancer at age ~65     Hypertension Father      Back Pain Father      Restless Leg Syndrome Father      Coronary Artery Disease Father      Diabetes Father      Depression Father      Anxiety Disorder Father      Osteoporosis Father      Restless Leg Syndrome Sister      Breast Cancer Maternal Aunt 55     Anesthesia Reaction No family hx of      Deep Vein Thrombosis (DVT) No family hx of      Thyroid Cancer No family hx of      Multiple endocrine neoplasia No family hx of        Additional medical/Social/Surgical histories reviewed in [facility] and updated as appropriate.       PHYSICAL EXAM  LMP  (LMP Unknown)     General  - normal appearance, in no obvious distress  Musculoskeletal - Left shoulder  - inspection: normal bone and joint alignment, no obvious deformity, no scapular winging, no AC step-off,   - palpation: Tenderness anterior joint line, normal clavicle, non-tender AC  - ROM:  Shoulder hiking with abduction, painful and limited forward elevation 45 degrees, abduction and internal rotation. External rotation limited to 20 degrees  - strength: 5/5  strength, 4/5 shoulder strength in abduction due to pain.  5/5 ER and IR  Neuro  - no sensory or motor deficit, grossly normal coordination, normal muscle tone  Skin  - no  ecchymosis, erythema, warmth, or induration, no obvious rash  Psych  - interactive, appropriate, normal mood and affect      IMAGING : X-ray left shoulder 3 views on 9/13/2024. Final results and radiologist's interpretation, available in the [facility] health record. Images were reviewed with the patient/family members in the office today. My personal interpretation of the performed imaging is severe degenerative arthrosis of left glenohumeral joint.  Moderate AC joint DJD.    Radiology read on 9/15/2024    Impression:  1. Severe degenerative changes of the left glenohumeral joint,  markedly progressed since comparison.  2. Moderate degenerative changes of the right acromioclavicular joint.  3. Mild inferior subluxation of the right humeral head relative to  glenoid, likely reflecting presence of joint effusion.      I have personally reviewed the examination and initial interpretation  and I agree with the findings.     VIJAY RIVERA      ASSESSMENT & PLAN  Ms. Ramirez is a 54 year old year old female who presents to clinic today with Follow Up of the Left Shoulder    Diagnosis: Advanced osteoarthrosis of left glenohumeral joint    I reviewed treatment options that she has undergone which is included physical therapy, oral analgesics, corticosteroid injection and she has had no benefit to date.  We discussed the severity of her arthrosis and that she certainly would be a candidate for arthroplasty from a radiographic and clinical symptomatology perspective.  I would like to refer her onto one of our a more technical perspective.  She would be interested in considering this.  Shoulder surgeons to determine her candidacy from as an alternative I also discussed pain management for nerve blocks or RFA procedures.  She is more interested in pursuing some definitive treatment if it is available to her.    Follow-up as needed.    It was a pleasure seeing Leni James, DO, [facility]  Primary Care Sports  Medicine        Again, thank you for allowing me to participate in the care of your patient.      Sincerely,    Parveen James, DO

## 2024-10-19 ENCOUNTER — HEALTH MAINTENANCE LETTER (OUTPATIENT)
Age: 54
End: 2024-10-19

## 2024-10-21 ENCOUNTER — PATIENT OUTREACH (OUTPATIENT)
Dept: CARE COORDINATION | Facility: CLINIC | Age: 54
End: 2024-10-21
Payer: COMMERCIAL

## 2024-10-22 NOTE — PROGRESS NOTES
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  Symptoms started about a year ago in getting worse, pain originated about 5 years ago. There was not a precipitating event. The pain is located to the deep shoulder. Worst pain is rated a 10+ of 10, and current pain is rated at 5 of 10. Symptoms are worsened by movement and usage. Symptoms are improved with rest. Patient has tried a steroid injection ([facility] on 9/27/24 with Dr. James) with minimal relief. Associated symptoms include pain and limited mobility. She has been using ice, heating packs, ibuprofen, and Tylenol for her pain.Patient has symptoms radiating down the arm, with no numbness. Notably, the patient has had a cervical spine surgery with Dr. Wilhelm in 2/2024. No other concerns or complaints at this time.  SANE score R - 90/100 L - 40/50    PAST MEDICAL HISTORY:  Past Medical History:   Diagnosis Date    Allergic rhinitis     Anemia     Arthritis     Asthma     copd    Cervical pseudoarthrosis, sequela     Dental abscess 08/2015    Depressive disorder     Diaphragmatic hernia 07/11/2016    Gastroesophageal reflux disease     History of emphysema (H)     Hoarseness     Hypertension     Obstructive sleep apnea     Other chronic pain     Respiratory bronchiolitis interstitial lung disease (H)     Sleep apnea     Smoker 11/02/2015       PAST SURGICAL HISTORY:  Past Surgical History:   Procedure Laterality Date    CERVICAL FUSION      COLONOSCOPY      COLONOSCOPY N/A 02/06/2020    Procedure: COLONOSCOPY, WITH POLYPECTOMY AND BIOPSY;  Surgeon: Julian Mccullough MD;  Location:  GI    CYSTOSCOPY      ENT SURGERY      ESOPHAGOSCOPY, GASTROSCOPY, DUODENOSCOPY (EGD), COMBINED N/A 02/06/2020    Procedure: ESOPHAGOGASTRODUODENOSCOPY (EGD);   Surgeon: Julian Mccullough MD;  Location:  GI    EXCISE LESION INTRAORAL Bilateral 10/03/2018    Procedure: EXCISE LESION INTRAORAL;  Wide Local Excision Of of Left Oral Cavity Ulcer;  Surgeon: Morro Mijares MD;  Location: [facility] OR    GYN SURGERY      HC DRAIN SKIN ABSCESS SIMPLE/SINGLE  03/16/2012    Procedure:INCISION AND DRAINAGE, ABSCESS, SIMPLE; Surgeon:CHRISTIANO HANCOCK; Location: GI    HEAD & NECK SURGERY      HYSTERECTOMY      HYSTERECTOMY      INCISION AND DRAINAGE ABDOMEN WASHOUT, COMBINED      INJECT EPIDURAL CERVICAL N/A 10/14/2021    Procedure: Cervical 7- Thoracic 1 epidural steroid injection with fluoroscopy;  Surgeon: Tram Florian MD;  Location: [facility] OR    INJECT EPIDURAL LUMBAR Right 09/15/2020    Procedure: Lumbar5- sacral 1 epidural steroid injection with fluoroscopy;  Surgeon: Tram Florian MD;  [facility]    INJECT EPIDURAL LUMBAR Right 06/29/2021    Procedure: Lumbar 5 sacral 1 epidural steroid injection with fluoroscopy;  Surgeon: Tram Florian MD;  Location: [facility] OR    INJECT SACROILIAC JOINT Bilateral 06/16/2020    Procedure: Bilateral sacroiliac joint steroid injection with fluoroscopy;  Surgeon: Tram Florian MD;  Location: [facility] OR    LAMINECTOMY THORACIC ONE LEVEL N/A 08/19/2019    Procedure: LAMINECTOMY, SPINE, THORACIC, 11-12 and Part of Lumbar 1, DRAINAGE OF EPIDURAL ABCESS, Epidural Drain Placement X 2;  Surgeon: Yadiel Beal MD;  Location:  OR    OPTICAL TRACKING SYSTEM FUSION POSTERIOR CERVICAL THREE + LEVELS N/A 2/12/2024    Procedure: Posterior instrumented spinal fusion cervical 2-5; laminectomies cervical 3-4; use of local autograft and crushed cancellous allograft.;  Surgeon: Philip Wilhelm MD;  Location:  OR    OPTICAL TRACKING SYSTEM FUSION SPINE POSTERIOR LUMBAR THREE+ LEVELS N/A 11/30/2022    Procedure: Posterior Instrumented Spinal Fusion Thoracic 8 to Sacrum with Bilateral Pelvic Fixation; Transforaminal Lumbar  Interbody Fusion with Erickson-Nixon Osteotomy Lumbar 1 to Sacral 1 (5 levels); Use of Infuse Bone Morphogenetic Protein Large Kit and Allograft;  Surgeon: Philip Wilhelm MD;  Location:  OR    ORTHOPEDIC SURGERY      AL PERCUT IMPLNT NEUROELECT,EPIDURAL N/A 08/08/2019    Procedure: TRIAL, SPINAL CORD STIMULATOR WITH BOSTON SCIENTIFIC;  Surgeon: Sipple, Daniel Peter, DO;  Location: [facility];  Service: Pain    RADIO FREQUENCY ABLATION / DESTRUCTION OF SACROILOAC JOINT DORSAL PRIMARY RAMUS Bilateral 12/17/2019    Procedure: Bilateral lumbar radiofrequency ablation with fluoroscopy and intravenous sedation ( Lumbar 2,3,4,5 medial branch nerves for the bilateral lumbar3-4, 4-5 and 5-sacral1 joints.;  Surgeon: Tram Florian MD;  Location: [facility]    RADIO FREQUENCY ABLATION / DESTRUCTION OF SACROILOAC JOINT DORSAL PRIMARY RAMUS Right 11/17/2020    Procedure: Right lumbar medial branch nerve radiofrequency ablation right L2,3,4,5 nerves supplying the right L3-4, L4-5 and L5-S1 facet joints;  Surgeon: Tram Florian MD;  [place] [facility] [place]    spinal cord stimulator  08/08/2019    spinal cord stimulator removal  08/13/2019       CURRENT MEDICATIONS:  Current Outpatient Medications   Medication Sig Dispense Refill    acetaminophen (TYLENOL) 500 MG tablet Take 1-2 tablets (500-1,000 mg) by mouth every 6 hours as needed for mild pain 100 tablet 1    albuterol (PROAIR HFA/PROVENTIL HFA/VENTOLIN HFA) 108 (90 Base) MCG/ACT inhaler Inhale 2 puffs into the lungs every 6 hours as needed for shortness of breath / dyspnea or wheezing Ventolin please 18 g 2    albuterol (PROVENTIL) (2.5 MG/3ML) 0.083% neb solution INHALE 3 ML VIA A NEBULIZER 4 TIMES DAILY IF NEEDED.      ARIPiprazole (ABILIFY) 5 MG tablet Take 5 mg by mouth every morning      azelastine (ASTELIN) 0.1 % nasal spray Spray 2 sprays in nostril 2 times daily      busPIRone HCl (BUSPAR) 30 MG tablet Take 30 mg by mouth 2 times daily       celecoxib  (CELEBREX) 100 MG capsule Take 1 capsule (100 mg) by mouth 2 times daily. As needed for joint pain 60 capsule 0    cloNIDine (CATAPRES) 0.1 MG tablet Take 1 tablet by mouth as needed      cyclobenzaprine (FLEXERIL) 10 MG tablet TAKE 1 TABLET BY MOUTH EVERYDAY AT BEDTIME 90 tablet 1    DULoxetine (CYMBALTA) 60 MG capsule Take 120 mg by mouth At Bedtime       EPINEPHrine (ANY BX GENERIC EQUIV) 0.3 MG/0.3ML injection 2-pack Inject 0.3 mLs (0.3 mg) into the muscle as needed for anaphylaxis May repeat one time in 5-15 minutes if response to initial dose is inadequate. 2 each 1    hydroxychloroquine (PLAQUENIL) 200 MG tablet Take 1 tablet (200 mg) by mouth 2 times daily 180 tablet 3    insulin pen needle (32G X 4 MM) 32G X 4 MM miscellaneous Use 1 NEEDLE WEEKLY 12 each 3    Lidocaine (LIDOCARE) 4 % Patch Place 1 patch onto the skin every 24 hours To prevent lidocaine toxicity, patient should be patch free for 12 hrs daily.Do not place directly over the incision. 15 patch 3    liraglutide - Weight Management (SAXENDA) 18 MG/3ML pen INJECT 3 MG SUBCUTANEOUS DAILY FOR 90 DAYS 45 mL 0    methocarbamol (ROBAXIN) 750 MG tablet Take 1 tablet (750 mg) by mouth 3 times daily 35 tablet 2    montelukast (SINGULAIR) 10 MG tablet Take 1 tablet by mouth At Bedtime      naloxone (NARCAN) 4 MG/0.1ML nasal spray Spray 1 spray (4 mg) into one nostril alternating nostrils as needed for opioid reversal every 2-3 minutes until assistance arrives 0.2 mL 0    omeprazole (PRILOSEC) 40 MG DR capsule TAKE 1 CAPSULE BY MOUTH EVERY DAY (Patient taking differently: Take 40 mg by mouth every evening.) 90 capsule 3    OXYGEN-HELIUM IN 3L @ night    Oxygen only not helium      polyethylene glycol (MIRALAX) 17 GM/Dose powder Take 17 g by mouth daily      predniSONE 5 MG (21) TBPK Take 5 mg by mouth daily      pregabalin (LYRICA) 150 MG capsule Take 1 capsule by mouth in the morning and around 1 PM 60 capsule 5    pregabalin (LYRICA) 150 MG capsule Take 1  capsule by mouth in the morning and around 1 PM 60 capsule 3    Respiratory Therapy Supplies (CARETOUCH UNIVERSL CPAP FILTER) MISC       rOPINIRole (REQUIP) 0.5 MG tablet Take 1 tablet (0.5 mg) by mouth at bedtime. 90 tablet 1    SENNA-docusate sodium (SENNA S) 8.6-50 MG tablet Take 1 tablet by mouth at bedtime 30 tablet 2    STIOLTO RESPIMAT 2.5-2.5 MCG/ACT AERS Inhale 2 puffs into the lungs every morning         ALLERGIES:      Allergies   Allergen Reactions    Bee Venom Anaphylaxis    Doxycycline Anaphylaxis     Patient thinks it may have been just nausea and vomiting, however unable to confirm  Other reaction(s): throat closes    Erythromycin      Other reaction(s): Vomiting      Hydrocodone-Acetaminophen Itching         FAMILY HISTORY: No pertinent family history, reviewed in EMR.    SOCIAL HISTORY:   Social History     Socioeconomic History    Marital status: Single     Spouse name: Not on file    Number of children: Not on file    Years of education: Not on file    Highest education level: Not on file   Occupational History    Not on file   Tobacco Use    Smoking status: Former     Current packs/day: 0.00     Average packs/day: 1.5 packs/day for 35.0 years (52.5 ttl pk-yrs)     Types: Cigarettes     Start date: 1996     Quit date: 2021     Years since quittin.9    Smokeless tobacco: Former     Quit date: 2021   Vaping Use    Vaping status: Former   Substance and Sexual Activity    Alcohol use: Never    Drug use: Yes     Types: Marijuana     Comment: smoke 2x times a week    Sexual activity: Not Currently     Partners: Male     Birth control/protection: Abstinence   Other Topics Concern    Parent/sibling w/ CABG, MI or angioplasty before 65F 55M? No   Social History Narrative    Not on file     Social Determinants of Health     Financial Resource Strain: Low Risk  (2024)    Financial Resource Strain     Within the past 12 months, have you or your family members you live with been unable  to get utilities (heat, electricity) when it was really needed?: No   Food Insecurity: Low Risk  (5/2/2024)    Food Insecurity     Within the past 12 months, did you worry that your food would run out before you got money to buy more?: No     Within the past 12 months, did the food you bought just not last and you didn t have money to get more?: No   Transportation Needs: Low Risk  (5/2/2024)    Transportation Needs     Within the past 12 months, has lack of transportation kept you from medical appointments, getting your medicines, non-medical meetings or appointments, work, or from getting things that you need?: No   Physical Activity: Insufficiently Active (5/2/2024)    Exercise Vital Sign     Days of Exercise per Week: 3 days     Minutes of Exercise per Session: 30 min   Stress: No Stress Concern Present (5/2/2024)    [place] [employer] - Occupational Stress Questionnaire     Feeling of Stress : Not at all   Social Connections: Unknown (5/2/2024)    Social Connection and Isolation Panel [NHANES]     Frequency of Communication with Friends and Family: Not on file     Frequency of Social Gatherings with Friends and Family: Once a week     Attends [employer] Services: Not on file     Active Member of Clubs or Organizations: Not on file     Attends Club or Organization Meetings: Not on file     Marital Status: Not on file   Interpersonal Safety: Low Risk  (5/2/2024)    Interpersonal Safety     Do you feel physically and emotionally safe where you currently live?: Yes     Within the past 12 months, have you been hit, slapped, kicked or otherwise physically hurt by someone?: No     Within the past 12 months, have you been humiliated or emotionally abused in other ways by your partner or ex-partner?: No   Housing Stability: Low Risk  (5/2/2024)    Housing Stability     Do you have housing? : Yes     Are you worried about losing your housing?: No       REVIEW OF SYSTEMS: Positive for that noted in  past medical history and history of present illness and otherwise reviewed in EMR    PHYSICAL EXAM:  Patient is Data Unavailable and weighs 0 lbs 0 oz LMP  (LMP Unknown)   There is no height or weight on file to calculate BMI.   Constitutional: Well-developed, well-nourished, healthy appearing female.  Skin: Warm, dry   HEENT: Normal  Cardiac: Well perfused extremities, strong 2+ peripheral pulses. No edema.   Pulmonary: Breathing room air    Musculoskeletal:   Left Shoulder:  AROM Left shoulder: 70/60/10/L5   AROM Right shoulder: 130/130/60/L2   PROM Left shoulder: 90/90/50/L5   5/5 supraspinatus, 5/5 infraspinatus, 5/5 subscapularis  no AC joint pain, negative cross body adduction  negative belly-press/lift-off    Neurovascular exam and cervical spine exam are normal.    X-RAYS:   AP, lateral, zanca, and axillary radiographs of the left shoulder were ordered and reviewed by me personally showing large inferior glenohumeral head osteophyte, inferior glenoid osteophyte    ADVANCED IMAGING:     IMPRESSION: 54 year old-year-old right hand dominant female, with left glenohumeral osteoarthritis.     PLAN:     I discussed with the patient the etiology of their condition. We discussed at length the options as noted above. We discussed conservative treatment options, including repeat glenohumeral steroid injections, nerve block, nerve ablations, over the counter medications. She has had a glenohumeral steroid injection in the last month, making her ineligible for repeat glenohumeral steroid injection at this time. We also discussed definitive treatment of shoulder arthroplasty and associated risks of infection, loosening of the implants, neurovascular injury, and possible revision given patient's young age. Patient is a former smoker and currently on home oxygen at night due to interstitial lung disease. Zayra stated understanding of the above treatment options. She is going to think about which treatment she would like  to pursue and contact clinic once she has made a decision.     At the conclusion of the office visit, Zayra verbally acknowledged that I answered all of her questions satisfactorily.    Nataliia Dixon MD  Orthopedic Surgery Sports Medicine and Shoulder Surgery

## 2024-10-23 ENCOUNTER — OFFICE VISIT (OUTPATIENT)
Dept: ORTHOPEDICS | Facility: CLINIC | Age: 54
End: 2024-10-23
Attending: FAMILY MEDICINE
Payer: COMMERCIAL

## 2024-10-23 ENCOUNTER — PRE VISIT (OUTPATIENT)
Dept: ORTHOPEDICS | Facility: CLINIC | Age: 54
End: 2024-10-23

## 2024-10-23 ENCOUNTER — MYC MEDICAL ADVICE (OUTPATIENT)
Dept: ORTHOPEDICS | Facility: CLINIC | Age: 54
End: 2024-10-23

## 2024-10-23 DIAGNOSIS — Z87.39 HISTORY OF SERONEGATIVE INFLAMMATORY ARTHRITIS: ICD-10-CM

## 2024-10-23 DIAGNOSIS — M19.012 PRIMARY OSTEOARTHRITIS OF LEFT SHOULDER: ICD-10-CM

## 2024-10-23 DIAGNOSIS — M19.012 PRIMARY OSTEOARTHRITIS OF LEFT SHOULDER: Primary | ICD-10-CM

## 2024-10-23 PROCEDURE — 99214 OFFICE O/P EST MOD 30 MIN: CPT | Performed by: ORTHOPAEDIC SURGERY

## 2024-10-23 NOTE — LETTER
10/23/2024      Zayra Ramirez  [address] [place] [address]      Dear Colleague,    Thank you for referring your patient, Zayra Ramirez, to the [facility] [place]. Please see a copy of my visit note below.    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  Symptoms started about a year ago in getting worse, pain originated about 5 years ago. There was not a precipitating event. The pain is located to the deep shoulder. Worst pain is rated a 10+ of 10, and current pain is rated at 5 of 10. Symptoms are worsened by movement and usage. Symptoms are improved with rest. Patient has tried a steroid injection ([facility] on 9/27/24 with Dr. James) with minimal relief. Associated symptoms include pain and limited mobility. She has been using ice, heating packs, ibuprofen, and Tylenol for her pain.Patient has symptoms radiating down the arm, with no numbness. Notably, the patient has had a cervical spine surgery with Dr. Wilhelm in 2/2024. No other concerns or complaints at this time.  SANE score R - 90/100 L - 40/50    PAST MEDICAL HISTORY:  Past Medical History:   Diagnosis Date     Allergic rhinitis      Anemia      Arthritis      Asthma     copd     Cervical pseudoarthrosis, sequela      Dental abscess 08/2015     Depressive disorder      Diaphragmatic hernia 07/11/2016     Gastroesophageal reflux disease      History of emphysema (H)      Hoarseness      Hypertension      Obstructive sleep apnea      Other chronic pain      Respiratory bronchiolitis interstitial lung disease (H)      Sleep apnea      Smoker 11/02/2015       PAST SURGICAL HISTORY:  Past Surgical History:   Procedure Laterality Date     CERVICAL FUSION       COLONOSCOPY       COLONOSCOPY N/A  02/06/2020    Procedure: COLONOSCOPY, WITH POLYPECTOMY AND BIOPSY;  Surgeon: Julian Mccullough MD;  Location:  GI     CYSTOSCOPY       ENT SURGERY       ESOPHAGOSCOPY, GASTROSCOPY, DUODENOSCOPY (EGD), COMBINED N/A 02/06/2020    Procedure: ESOPHAGOGASTRODUODENOSCOPY (EGD);  Surgeon: Julian Mccullough MD;  Location:  GI     EXCISE LESION INTRAORAL Bilateral 10/03/2018    Procedure: EXCISE LESION INTRAORAL;  Wide Local Excision Of of Left Oral Cavity Ulcer;  Surgeon: Morro Mijares MD;  Location: [facility] OR     GYN SURGERY       HC DRAIN SKIN ABSCESS SIMPLE/SINGLE  03/16/2012    Procedure:INCISION AND DRAINAGE, ABSCESS, SIMPLE; Surgeon:CHRISTIANO HANCOCK; Location: GI     HEAD & NECK SURGERY       HYSTERECTOMY       HYSTERECTOMY       INCISION AND DRAINAGE ABDOMEN WASHOUT, COMBINED       INJECT EPIDURAL CERVICAL N/A 10/14/2021    Procedure: Cervical 7- Thoracic 1 epidural steroid injection with fluoroscopy;  Surgeon: Tram Florian MD;  Location: [facility] OR     INJECT EPIDURAL LUMBAR Right 09/15/2020    Procedure: Lumbar5- sacral 1 epidural steroid injection with fluoroscopy;  Surgeon: Tram Florian MD;  [facility]     INJECT EPIDURAL LUMBAR Right 06/29/2021    Procedure: Lumbar 5 sacral 1 epidural steroid injection with fluoroscopy;  Surgeon: Tram Florian MD;  Location: [facility] OR     INJECT SACROILIAC JOINT Bilateral 06/16/2020    Procedure: Bilateral sacroiliac joint steroid injection with fluoroscopy;  Surgeon: Tram Florian MD;  [facility] OR     LAMINECTOMY THORACIC ONE LEVEL N/A 08/19/2019    Procedure: LAMINECTOMY, SPINE, THORACIC, 11-12 and Part of Lumbar 1, DRAINAGE OF EPIDURAL ABCESS, Epidural Drain Placement X 2;  Surgeon: Yadiel Beal MD;  Location:  OR     OPTICAL TRACKING SYSTEM FUSION POSTERIOR CERVICAL THREE + LEVELS N/A 2/12/2024    Procedure: Posterior instrumented spinal fusion cervical 2-5; laminectomies cervical 3-4; use of local autograft and crushed  cancellous allograft.;  Surgeon: Philip Wilhelm MD;  Location:  OR     OPTICAL TRACKING SYSTEM FUSION SPINE POSTERIOR LUMBAR THREE+ LEVELS N/A 11/30/2022    Procedure: Posterior Instrumented Spinal Fusion Thoracic 8 to Sacrum with Bilateral Pelvic Fixation; Transforaminal Lumbar Interbody Fusion with Erickson-Nixon Osteotomy Lumbar 1 to Sacral 1 (5 levels); Use of Infuse Bone Morphogenetic Protein Large Kit and Allograft;  Surgeon: Philip Wilhelm MD;  Location:  OR     ORTHOPEDIC SURGERY       FL PERCUT IMPLNT NEUROELECT,EPIDURAL N/A 08/08/2019    Procedure: TRIAL, SPINAL CORD STIMULATOR WITH [employer];  Surgeon: Sipple, Daniel Peter, DO;  Location: [facility];  Service: Pain     RADIO FREQUENCY ABLATION / DESTRUCTION OF SACROILOAC JOINT DORSAL PRIMARY RAMUS Bilateral 12/17/2019    Procedure: Bilateral lumbar radiofrequency ablation with fluoroscopy and intravenous sedation ( Lumbar 2,3,4,5 medial branch nerves for the bilateral lumbar3-4, 4-5 and 5-sacral1 joints.;  Surgeon: Tram Florian MD;  Location:  OR     RADIO FREQUENCY ABLATION / DESTRUCTION OF SACROILOAC JOINT DORSAL PRIMARY RAMUS Right 11/17/2020    Procedure: Right lumbar medial branch nerve radiofrequency ablation right L2,3,4,5 nerves supplying the right L3-4, L4-5 and L5-S1 facet joints;  Surgeon: Tram Florian MD;  Location: [facility] OR     spinal cord stimulator  08/08/2019     spinal cord stimulator removal  08/13/2019       CURRENT MEDICATIONS:  Current Outpatient Medications   Medication Sig Dispense Refill     acetaminophen (TYLENOL) 500 MG tablet Take 1-2 tablets (500-1,000 mg) by mouth every 6 hours as needed for mild pain 100 tablet 1     albuterol (PROAIR HFA/PROVENTIL HFA/VENTOLIN HFA) 108 (90 Base) MCG/ACT inhaler Inhale 2 puffs into the lungs every 6 hours as needed for shortness of breath / dyspnea or wheezing Ventolin please 18 g 2     albuterol (PROVENTIL) (2.5 MG/3ML) 0.083% neb solution INHALE  3 ML VIA A NEBULIZER 4 TIMES DAILY IF NEEDED.       ARIPiprazole (ABILIFY) 5 MG tablet Take 5 mg by mouth every morning       azelastine (ASTELIN) 0.1 % nasal spray Spray 2 sprays in nostril 2 times daily       busPIRone HCl (BUSPAR) 30 MG tablet Take 30 mg by mouth 2 times daily        celecoxib (CELEBREX) 100 MG capsule Take 1 capsule (100 mg) by mouth 2 times daily. As needed for joint pain 60 capsule 0     cloNIDine (CATAPRES) 0.1 MG tablet Take 1 tablet by mouth as needed       cyclobenzaprine (FLEXERIL) 10 MG tablet TAKE 1 TABLET BY MOUTH EVERYDAY AT BEDTIME 90 tablet 1     DULoxetine (CYMBALTA) 60 MG capsule Take 120 mg by mouth At Bedtime        EPINEPHrine (ANY BX GENERIC EQUIV) 0.3 MG/0.3ML injection 2-pack Inject 0.3 mLs (0.3 mg) into the muscle as needed for anaphylaxis May repeat one time in 5-15 minutes if response to initial dose is inadequate. 2 each 1     hydroxychloroquine (PLAQUENIL) 200 MG tablet Take 1 tablet (200 mg) by mouth 2 times daily 180 tablet 3     insulin pen needle (32G X 4 MM) 32G X 4 MM miscellaneous Use 1 NEEDLE WEEKLY 12 each 3     Lidocaine (LIDOCARE) 4 % Patch Place 1 patch onto the skin every 24 hours To prevent lidocaine toxicity, patient should be patch free for 12 hrs daily.Do not place directly over the incision. 15 patch 3     liraglutide - Weight Management (SAXENDA) 18 MG/3ML pen INJECT 3 MG SUBCUTANEOUS DAILY FOR 90 DAYS 45 mL 0     methocarbamol (ROBAXIN) 750 MG tablet Take 1 tablet (750 mg) by mouth 3 times daily 35 tablet 2     montelukast (SINGULAIR) 10 MG tablet Take 1 tablet by mouth At Bedtime       naloxone (NARCAN) 4 MG/0.1ML nasal spray Spray 1 spray (4 mg) into one nostril alternating nostrils as needed for opioid reversal every 2-3 minutes until assistance arrives 0.2 mL 0     omeprazole (PRILOSEC) 40 MG DR capsule TAKE 1 CAPSULE BY MOUTH EVERY DAY (Patient taking differently: Take 40 mg by mouth every evening.) 90 capsule 3     OXYGEN-HELIUM IN 3L @  night    Oxygen only not helium       polyethylene glycol (MIRALAX) 17 GM/Dose powder Take 17 g by mouth daily       predniSONE 5 MG (21) TBPK Take 5 mg by mouth daily       pregabalin (LYRICA) 150 MG capsule Take 1 capsule by mouth in the morning and around 1 PM 60 capsule 5     pregabalin (LYRICA) 150 MG capsule Take 1 capsule by mouth in the morning and around 1 PM 60 capsule 3     Respiratory Therapy Supplies (Critical access hospital CPAP FILTER) MISC        rOPINIRole (REQUIP) 0.5 MG tablet Take 1 tablet (0.5 mg) by mouth at bedtime. 90 tablet 1     SENNA-docusate sodium (SENNA S) 8.6-50 MG tablet Take 1 tablet by mouth at bedtime 30 tablet 2     STIOLTO RESPIMAT 2.5-2.5 MCG/ACT AERS Inhale 2 puffs into the lungs every morning         ALLERGIES:      Allergies   Allergen Reactions     Bee Venom Anaphylaxis     Doxycycline Anaphylaxis     Patient thinks it may have been just nausea and vomiting, however unable to confirm  Other reaction(s): throat closes     Erythromycin      Other reaction(s): Vomiting       Hydrocodone-Acetaminophen Itching         FAMILY HISTORY: No pertinent family history, reviewed in EMR.    SOCIAL HISTORY:   Social History     Socioeconomic History     Marital status: Single     Spouse name: Not on file     Number of children: Not on file     Years of education: Not on file     Highest education level: Not on file   Occupational History     Not on file   Tobacco Use     Smoking status: Former     Current packs/day: 0.00     Average packs/day: 1.5 packs/day for 35.0 years (52.5 ttl pk-yrs)     Types: Cigarettes     Start date: 1996     Quit date: 2021     Years since quittin.9     Smokeless tobacco: Former     Quit date: 2021   Vaping Use     Vaping status: Former   Substance and Sexual Activity     Alcohol use: Never     Drug use: Yes     Types: Marijuana     Comment: smoke 2x times a week     Sexual activity: Not Currently     Partners: Male     Birth control/protection:  Abstinence   Other Topics Concern     Parent/sibling w/ CABG, MI or angioplasty before 65F 55M? No   Social History Narrative     Not on file     Social Determinants of Health     Financial Resource Strain: Low Risk  (5/2/2024)    Financial Resource Strain      Within the past 12 months, have you or your family members you live with been unable to get utilities (heat, electricity) when it was really needed?: No   Food Insecurity: Low Risk  (5/2/2024)    Food Insecurity      Within the past 12 months, did you worry that your food would run out before you got money to buy more?: No      Within the past 12 months, did the food you bought just not last and you didn t have money to get more?: No   Transportation Needs: Low Risk  (5/2/2024)    Transportation Needs      Within the past 12 months, has lack of transportation kept you from medical appointments, getting your medicines, non-medical meetings or appointments, work, or from getting things that you need?: No   Physical Activity: Insufficiently Active (5/2/2024)    Exercise Vital Sign      Days of Exercise per Week: 3 days      Minutes of Exercise per Session: 30 min   Stress: No Stress Concern Present (5/2/2024)    [employer] - Occupational Stress Questionnaire      Feeling of Stress : Not at all   Social Connections: Unknown (5/2/2024)    Social Connection and Isolation Panel [NHANES]      Frequency of Communication with Friends and Family: Not on file      Frequency of Social Gatherings with Friends and Family: Once a week      Attends Judaism Services: Not on file      Active Member of Clubs or Organizations: Not on file      Attends Club or Organization Meetings: Not on file      Marital Status: Not on file   Interpersonal Safety: Low Risk  (5/2/2024)    Interpersonal Safety      Do you feel physically and emotionally safe where you currently live?: Yes      Within the past 12 months, have you been hit, slapped, kicked or  otherwise physically hurt by someone?: No      Within the past 12 months, have you been humiliated or emotionally abused in other ways by your partner or ex-partner?: No   Housing Stability: Low Risk  (5/2/2024)    Housing Stability      Do you have housing? : Yes      Are you worried about losing your housing?: No       REVIEW OF SYSTEMS: Positive for that noted in past medical history and history of present illness and otherwise reviewed in EMR    PHYSICAL EXAM:  Patient is Data Unavailable and weighs 0 lbs 0 oz LMP  (LMP Unknown)   There is no height or weight on file to calculate BMI.   Constitutional: Well-developed, well-nourished, healthy appearing female.  Skin: Warm, dry   HEENT: Normal  Cardiac: Well perfused extremities, strong 2+ peripheral pulses. No edema.   Pulmonary: Breathing room air    Musculoskeletal:   Left Shoulder:  AROM Left shoulder: 70/60/10/L5   AROM Right shoulder: 130/130/60/L2   PROM Left shoulder: 90/90/50/L5   5/5 supraspinatus, 5/5 infraspinatus, 5/5 subscapularis  no AC joint pain, negative cross body adduction  negative belly-press/lift-off    Neurovascular exam and cervical spine exam are normal.    X-RAYS:   AP, lateral, zanca, and axillary radiographs of the left shoulder were ordered and reviewed by me personally showing large inferior glenohumeral head osteophyte, inferior glenoid osteophyte    ADVANCED IMAGING:     IMPRESSION: 54 year old-year-old right hand dominant female, with left glenohumeral osteoarthritis.     PLAN:     I discussed with the patient the etiology of their condition. We discussed at length the options as noted above. We discussed conservative treatment options, including repeat glenohumeral steroid injections, nerve block, nerve ablations, over the counter medications. She has had a glenohumeral steroid injection in the last month, making her ineligible for repeat glenohumeral steroid injection at this time. We also discussed definitive treatment of  shoulder arthroplasty and associated risks of infection, loosening of the implants, neurovascular injury, and possible revision given patient's young age. Patient is a former smoker and currently on home oxygen at night due to interstitial lung disease. Zayra stated understanding of the above treatment options. She is going to think about which treatment she would like to pursue and contact clinic once she has made a decision.     At the conclusion of the office visit, Zayra verbally acknowledged that I answered all of her questions satisfactorily.    Nataliia Plascencia MD  Orthopedic Surgery Sports Medicine and Shoulder Surgery      Again, thank you for allowing me to participate in the care of your patient.        Sincerely,        NATALIIA PLASCENCIA MD

## 2024-10-29 RX ORDER — CELECOXIB 100 MG/1
100 CAPSULE ORAL 2 TIMES DAILY
Qty: 60 CAPSULE | Refills: 2 | Status: SHIPPED | OUTPATIENT
Start: 2024-10-29

## 2024-10-29 NOTE — PROGRESS NOTES
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  Symptoms started about a year ago in getting worse, pain originated about 5 years ago. There was not a precipitating event.  SANE score R - 90/100 L - 40/50      PHYSICAL EXAM:  Patient is Data Unavailable and weighs 0 lbs 0 oz LMP  (LMP Unknown)   There is no height or weight on file to calculate BMI.   Constitutional: Well-developed, well-nourished, healthy appearing female.  Skin: Warm, dry   HEENT: Normal  Cardiac: Well perfused extremities, strong 2+ peripheral pulses. No edema.   Pulmonary: Breathing room air    Musculoskeletal:   Left Shoulder:  AROM Left shoulder: 70/60/10/L5   AROM Right shoulder: 130/130/60/L2   PROM Left shoulder: 90/90/50/L5   5/5 supraspinatus, 5/5 infraspinatus, 5/5 subscapularis  no AC joint pain, negative cross body adduction  negative belly-press/lift-off    Neurovascular exam and cervical spine exam are normal.    X-RAYS:   AP, lateral, zanca, and axillary radiographs of the left shoulder were ordered and reviewed by me personally showing large inferior glenohumeral head osteophyte, inferior glenoid osteophyte    ADVANCED IMAGING:     IMPRESSION: 54 year old-year-old right hand dominant female, with left glenohumeral osteoarthritis.     PLAN:     I discussed with the patient the etiology of their condition. We discussed at length the options as noted above.  Patient had further questions about getting a nerve block versus moving forward with an arthroplasty.  We again discussed extensively the options.  We discussed that the nerve block would be for pain control, again discussed that this would likely not result in significant function improvements.  Patient was nervous that the nerve block would give such complete  relief that she would do further damage and I advised that this would be very unlikely.  We alternatively discussed surgery with a total shoulder arthroplasty.  We again went through the risk and benefits.  Again noting that at the age of 54 there is a high chance that this implant may not last the patient's lifetime due to the risks of infection, loosening, periprosthetic fracture, rotator cuff tearing.  After discussion the patient wishes to think about it tomorrow and will call back.  If wishes to proceed forward with an arthroplasty would likely need another virtual visit to further discuss the patient completely understands the surgery.      At the conclusion of the office visit, Zayra verbally acknowledged that I answered all of her questions satisfactorily.    Nataliia Dixon MD  Orthopedic Surgery Sports Medicine and Shoulder Surgery    Zayra Ramirez is a 54 year old who is being evaluated via a billable telephone visit.      How would you like to obtain your AVS? MyChart      Telephone-Visit Details    Type of service:  Telephone Visit   Telephone Start Time:  200  Telephone End Time: 208    Originating Location (pt. Location): Home    Distant Location (provider location):  On-site

## 2024-10-29 NOTE — TELEPHONE ENCOUNTER
Patient confirmed scheduled appointment:  Date: 10/30/24  Time: 2:00pm  Visit type: Return shoulder (telephone)  Provider: Dr. Dixon

## 2024-10-29 NOTE — TELEPHONE ENCOUNTER
"celecoxib (CELEBREX) 100 MG capsule       Last Written Prescription Date:  9/26/24  Last Fill Quantity: 60,   # refills: 0  Last Office Visit : 9/26/24  [facility]  Future Office visit:  4/10/25    Routing refill request to provider for review/approval because:    NSAID Medications Zapavt30/23/2024 12:47 PM   Protocol Details Always Fail Criteria - Chart Review Required      At visit 9/26/24 Provider noted: \"3. Make a trial of celecoxib 100 mg twice daily for joint pain for the next 2 weeks. If significant relief of hand arthritis pain, reduce celecoxib to find the minimum effective dose. Example: Reduce celecoxib to once daily dosing, or to dosing 3 days/week. \" Patient has not responded back to clinic with any information. Also provider asked for f/u in 3 months. (12/30/24) Appt not booked until 4/10/25.   Sumaya EDWARDS RN  [facility]/Red Flag Triage & Med Refill Teameri  Sumaya EDWARDS RN  [facility]/Red Flag Triage & Med Refill Team    "

## 2024-10-30 ENCOUNTER — VIRTUAL VISIT (OUTPATIENT)
Dept: ORTHOPEDICS | Facility: CLINIC | Age: 54
End: 2024-10-30
Payer: COMMERCIAL

## 2024-10-30 DIAGNOSIS — M19.012 PRIMARY OSTEOARTHRITIS OF LEFT SHOULDER: Primary | ICD-10-CM

## 2024-10-30 PROCEDURE — 99441 PR PHYSICIAN TELEPHONE EVALUATION 5-10 MIN: CPT | Mod: 93 | Performed by: ORTHOPAEDIC SURGERY

## 2024-10-30 NOTE — LETTER
10/30/2024      Zayra Ramirez  [address] [place] [address]      Dear Colleague,    Thank you for referring your patient, Zayra Ramirez, to the [facility] [place]. Please see a copy of my visit note below.    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  Symptoms started about a year ago in getting worse, pain originated about 5 years ago. There was not a precipitating event.  SANE score R - 90/100 L - 40/50      PHYSICAL EXAM:  Patient is Data Unavailable and weighs 0 lbs 0 oz LMP  (LMP Unknown)   There is no height or weight on file to calculate BMI.   Constitutional: Well-developed, well-nourished, healthy appearing female.  Skin: Warm, dry   HEENT: Normal  Cardiac: Well perfused extremities, strong 2+ peripheral pulses. No edema.   Pulmonary: Breathing room air    Musculoskeletal:   Left Shoulder:  AROM Left shoulder: 70/60/10/L5   AROM Right shoulder: 130/130/60/L2   PROM Left shoulder: 90/90/50/L5   5/5 supraspinatus, 5/5 infraspinatus, 5/5 subscapularis  no AC joint pain, negative cross body adduction  negative belly-press/lift-off    Neurovascular exam and cervical spine exam are normal.    X-RAYS:   AP, lateral, zanca, and axillary radiographs of the left shoulder were ordered and reviewed by me personally showing large inferior glenohumeral head osteophyte, inferior glenoid osteophyte    ADVANCED IMAGING:     IMPRESSION: 54 year old-year-old right hand dominant female, with left glenohumeral osteoarthritis.     PLAN:     I discussed with the patient the etiology of their condition. We discussed at length the options as noted above.  Patient had further questions about getting a nerve block versus moving forward with an arthroplasty.  We  again discussed extensively the options.  We discussed that the nerve block would be for pain control, again discussed that this would likely not result in significant function improvements.  Patient was nervous that the nerve block would give such complete relief that she would do further damage and I advised that this would be very unlikely.  We alternatively discussed surgery with a total shoulder arthroplasty.  We again went through the risk and benefits.  Again noting that at the age of 54 there is a high chance that this implant may not last the patient's lifetime due to the risks of infection, loosening, periprosthetic fracture, rotator cuff tearing.  After discussion the patient wishes to think about it tomorrow and will call back.  If wishes to proceed forward with an arthroplasty would likely need another virtual visit to further discuss the patient completely understands the surgery.      At the conclusion of the office visit, Zayra verbally acknowledged that I answered all of her questions satisfactorily.    Nataliia Plascencia MD  Orthopedic Surgery Sports Medicine and Shoulder Surgery    Zayra Ramirez is a 54 year old who is being evaluated via a billable telephone visit.      How would you like to obtain your AVS? MyChart      Telephone-Visit Details    Type of service:  Telephone Visit   Telephone Start Time:  200  Telephone End Time: 208    Originating Location (pt. Location): Home    Distant Location (provider location):  On-site      Again, thank you for allowing me to participate in the care of your patient.        Sincerely,        NATALIIA PLASCENCIA MD

## 2024-11-10 DIAGNOSIS — G89.18 POSTOPERATIVE PAIN: ICD-10-CM

## 2024-11-12 RX ORDER — CYCLOBENZAPRINE HCL 10 MG
10 TABLET ORAL AT BEDTIME
Qty: 90 TABLET | Refills: 1 | Status: SHIPPED | OUTPATIENT
Start: 2024-11-12

## 2024-11-12 NOTE — TELEPHONE ENCOUNTER
Medication Requested:   Disp Refills Start End SIDDHARTHA   cyclobenzaprine (FLEXERIL) 10 MG tablet 90 tablet 1 5/28/2024 -- No   Sig - Route: TAKE 1 TABLET BY MOUTH EVERYDAY AT BEDTIME - Oral     ----------------------  Last Office Visit : Office Visit  5/2/2024  [facility] [place]      Future Office visit:  0  ----------------------        Refill decision: Refill pended and routed to the provider for review/determination due to the following criteria not met:     Medication not on [facility], [facility], [facility] refill protocol

## 2024-11-13 NOTE — PROGRESS NOTES
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  Patient ultimately decided does not wish to pursue forward with the nerve block.  Does wish to have surgery and would like to talk about this more.  SANE score R - 90/100 L - 40/50      PHYSICAL EXAM:  Patient is Data Unavailable and weighs 0 lbs 0 oz LMP  (LMP Unknown)   There is no height or weight on file to calculate BMI.   Constitutional: Well-developed, well-nourished, healthy appearing female.  Skin: Warm, dry   HEENT: Normal  Cardiac: Well perfused extremities, strong 2+ peripheral pulses. No edema.   Pulmonary: Breathing room air    Musculoskeletal:   Left Shoulder:  AROM Left shoulder: 70/60/10/L5   AROM Right shoulder: 130/130/60/L2   PROM Left shoulder: 90/90/50/L5   5/5 supraspinatus, 5/5 infraspinatus, 5/5 subscapularis  no AC joint pain, negative cross body adduction  negative belly-press/lift-off    Neurovascular exam and cervical spine exam are normal.    X-RAYS:   AP, lateral, zanca, and axillary radiographs of the left shoulder were ordered and reviewed by me personally showing large inferior glenohumeral head osteophyte, inferior glenoid osteophyte    ADVANCED IMAGING:     IMPRESSION: 54 year old-year-old right hand dominant female, with left glenohumeral osteoarthritis.     PLAN:     I discussed with the patient the etiology of their condition. We discussed at length the options as noted above.  Again discussed the options for glenohumeral arthritis including conservative versus surgical intervention.  Conservatively discussed activity modification, glenohumeral injection or suprascapular block, anti-inflammatories.  Alternatively again discussed surgery with both a total shoulder and potentially reverse.  We discussed needing an  MRI to assess the patient's rotator cuff.  We discussed risk and benefits of surgery as well as the anticipated rehab course.  Specifically discussed the patient's high risk of revision given age of 54, increased risk of infection.  Patient demonstrates understanding and does wish to move forward.  Will need a preoperative MRI and CT scan, benzoyl peroxide cleanse.      After going over these options, Zayra would like to proceed with left shoulder total arthroplasty and related procedures. We reviewed the risks and benefits of surgery including but not limited to the following: Risk of anesthesia, including death; infection; nerve/tendon/vessel injury; deep venous thrombosis (DVT), pulmonary embolism (PE); shoulder stiffness, infection requiring implant removal, bleeding requiring transfusion, nerve and blood vessel injury, periprosthetic fracture, instability and dislocation, implant failure or loosening requiring revision surgery; hardware- related problems; potential for continued pain after surgery; and the potential need for further surgery in the future.     After going over these risks, benefits and alternatives Zayra would like to proceed with surgery. All of her questions were answered satisfactorily and she signed the surgical consent form to proceed. All appropriate paperwork was completed and she will work with our surgical scheduling department to coordinate the surgery.      At the conclusion of the office visit, Zayra verbally acknowledged that I answered all of her questions satisfactorily.    Nataliia Dixon MD  Orthopedic Surgery Sports Medicine and Shoulder Surgery    Zayra Ramirez is a 54 year old who is being evaluated via a billable telephone visit.      How would you like to obtain your AVS? MyChart      Telephone-Visit Details    Type of service:  Telephone Visit   Telephone Start Time: 955  Telephone End Time:1005    Originating Location (pt. Location): Home    Distant Location  (provider location):  On-site

## 2024-11-14 ENCOUNTER — VIRTUAL VISIT (OUTPATIENT)
Dept: ORTHOPEDICS | Facility: CLINIC | Age: 54
End: 2024-11-14
Payer: COMMERCIAL

## 2024-11-14 ENCOUNTER — TELEPHONE (OUTPATIENT)
Dept: ORTHOPEDICS | Facility: CLINIC | Age: 54
End: 2024-11-14

## 2024-11-14 DIAGNOSIS — M19.012 PRIMARY OSTEOARTHRITIS OF LEFT SHOULDER: Primary | ICD-10-CM

## 2024-11-14 DIAGNOSIS — M54.12 CERVICAL RADICULOPATHY: ICD-10-CM

## 2024-11-14 PROCEDURE — 99441 PR PHYSICIAN TELEPHONE EVALUATION 5-10 MIN: CPT | Mod: 93 | Performed by: ORTHOPAEDIC SURGERY

## 2024-11-14 NOTE — CONFIDENTIAL NOTE
Other: Patient would like to speak to the provider or team about getting some meds due to being claustrophobic to get through her mri and ct. Please call patient back.      Could we send this information to you in [place] or would you prefer to receive a phone call?:   Patient would prefer a phone call   Okay to leave a detailed message?: Yes at Cell number on file:    Telephone Information:   Mobile [phone]

## 2024-11-14 NOTE — LETTER
11/14/2024      Zayra Ramirez  [id number] [place] [address]      Dear Colleague,    Thank you for referring your patient, Zayra Ramirez, to the [facility]. Please see a copy of my visit note below.    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  Patient ultimately decided does not wish to pursue forward with the nerve block.  Does wish to have surgery and would like to talk about this more.  SANE score R - 90/100 L - 40/50      PHYSICAL EXAM:  Patient is Data Unavailable and weighs 0 lbs 0 oz LMP  (LMP Unknown)   There is no height or weight on file to calculate BMI.   Constitutional: Well-developed, well-nourished, healthy appearing female.  Skin: Warm, dry   HEENT: Normal  Cardiac: Well perfused extremities, strong 2+ peripheral pulses. No edema.   Pulmonary: Breathing room air    Musculoskeletal:   Left Shoulder:  AROM Left shoulder: 70/60/10/L5   AROM Right shoulder: 130/130/60/L2   PROM Left shoulder: 90/90/50/L5   5/5 supraspinatus, 5/5 infraspinatus, 5/5 subscapularis  no AC joint pain, negative cross body adduction  negative belly-press/lift-off    Neurovascular exam and cervical spine exam are normal.    X-RAYS:   AP, lateral, zanca, and axillary radiographs of the left shoulder were ordered and reviewed by me personally showing large inferior glenohumeral head osteophyte, inferior glenoid osteophyte    ADVANCED IMAGING:     IMPRESSION: 54 year old-year-old right hand dominant female, with left glenohumeral osteoarthritis.     PLAN:     I discussed with the patient the etiology of their condition. We discussed at length the options as noted above.  Again discussed the options for glenohumeral arthritis including conservative versus surgical  intervention.  Conservatively discussed activity modification, glenohumeral injection or suprascapular block, anti-inflammatories.  Alternatively again discussed surgery with both a total shoulder and potentially reverse.  We discussed needing an MRI to assess the patient's rotator cuff.  We discussed risk and benefits of surgery as well as the anticipated rehab course.  Specifically discussed the patient's high risk of revision given age of 54, increased risk of infection.  Patient demonstrates understanding and does wish to move forward.  Will need a preoperative MRI and CT scan, benzoyl peroxide cleanse.      After going over these options, Zayra would like to proceed with left shoulder total arthroplasty and related procedures. We reviewed the risks and benefits of surgery including but not limited to the following: Risk of anesthesia, including death; infection; nerve/tendon/vessel injury; deep venous thrombosis (DVT), pulmonary embolism (PE); shoulder stiffness, infection requiring implant removal, bleeding requiring transfusion, nerve and blood vessel injury, periprosthetic fracture, instability and dislocation, implant failure or loosening requiring revision surgery; hardware- related problems; potential for continued pain after surgery; and the potential need for further surgery in the future.     After going over these risks, benefits and alternatives Zayra would like to proceed with surgery. All of her questions were answered satisfactorily and she signed the surgical consent form to proceed. All appropriate paperwork was completed and she will work with our surgical scheduling department to coordinate the surgery.      At the conclusion of the office visit, Zayra verbally acknowledged that I answered all of her questions satisfactorily.    Nataliia Dixon MD  Orthopedic Surgery Sports Medicine and Shoulder Surgery    Zayra Ramirez is a 54 year old who is being evaluated via a billable telephone  visit.      How would you like to obtain your AVS? MyChart      Telephone-Visit Details    Type of service:  Telephone Visit   Telephone Start Time: 955  Telephone End Time:1005    Originating Location (pt. Location): Home    Distant Location (provider location):  On-site      Again, thank you for allowing me to participate in the care of your patient.        Sincerely,        RIGO PLASCENCIA MD

## 2024-11-14 NOTE — TELEPHONE ENCOUNTER
Need MRI and CT to be done at least 6 weeks prior to surgery for pre-op planning. Need PAC d/t lung issues.    Procedure: Left total shoulder arthroplasty, possible reverse  Facility: [facility]  Length: 150 minutes  Anesthesia: General, Interscalene Block  Post-op appointments needed: 1 week with surgeon only, 6 weeks with surgeon only.  Surgery packet/instructions given to patient?  No, to be mailed or sent via [employer]    Jimmie Landry RNCC

## 2024-11-14 NOTE — NURSING NOTE
"Pre-Operative Teaching Flowsheet     Person(s) involved in teaching: Patient     Motivation Level:  Receptive (willing/able to accept information) and asks appropriate questions where applicable: Yes  Any cultural factors/Sabianist beliefs that may influence understanding or compliance? No     Patient demonstrates understanding of the following:  Pre-operative planning, including the necessary appointments and preparation needed prior to surgery: Yes  Which situations necessitate calling provider and whom to contact: Yes  Pain management techniques pre and post op: Yes  Stoplight tool introduced, questions answered, patient expressed understanding: Yes  How, and when, to access community resources: Yes  Discussed appropriate and safe discharge to home: Yes  Patient has a designated  for surgery and \"\" to stay with them after: Yes    Additional Teaching Concerns Addressed:  Post-operative living arrangements and necessary adaptations to living environment.  Instructional Materials Used/Given: Yes, pre-op packet given to patient with additional system forms added as needed depending on type of surgery. Pre-op soap given (if in clinic).     Time spent with patient: 20 minutes.    Jimmie Landry RNCC  "

## 2024-11-14 NOTE — TELEPHONE ENCOUNTER
Date Scheduled: 2-4-25  Facility: Surgery Locations: [facility]  Surgeon: Dr. Dixon   Post-op appointment scheduled: 2-19-25 [facility]   scheduled?: No  Surgery packet/instructions confirmed received?  No-please mail  Pre op physical/PAC appointment: Neena Tam  Special Considerations:     Gave patient imaging scheduling number to call to get MRI and CT scheduled  Pt is getting  on Jan. 23rd, so waiting until after wedding.    Mia Mason  Surgery Scheduling Coordinator  Ph: [phone]

## 2024-11-15 NOTE — PROGRESS NOTES
Zayra Ramirez is a 54 year old who is being evaluated via a billable video visit.      How would you like to obtain your AVS? MyChart  If the video visit is dropped, the invitation should be resent by: Text to cell phone: [phone]  Will anyone else be joining your video visit? No        Medical  Weight Loss Follow-Up Diet Evaluation  Assessment:  Zayra is presenting today for a follow up weight management nutrition consultation.  This patient has had an initial appointment and was referred by Dr. Hong  for MNT as treatment for Obesity   Weight loss medication:  none at this time .   Pt's weight is 180 lbs (10/16/2024)  Initial weight: 219 lbs  Weight change: 39 lbs, 17.8% weight loss        9/20/2023    10:06 AM   Changes and Difficulties   I have made the following changes to my diet since my last visit: lowered carbs   With regards to my diet, I am still struggling with: enough protein   I have made the following changes to my activity/exercise since my last visit: increased walking   With regards to my activity/exercise, I am still struggling with: walking over a mile atatime     BMI: 30.89  Ideal body weight: 54.7 kg (120 lb 9.5 oz)  Adjusted ideal body weight: 65.5 kg (144 lb 5.7 oz)    Estimated RMR (Boykins-St Jeor equation):   1,440 kcals x 1.2 (sedentary) = 1,725 kcals (for weight maintenance)  Recommended Protein Intake: 60-80 grams of protein/day  Patient Active Problem List:  Patient Active Problem List   Diagnosis    Chronic bilateral low back pain without sciatica    Facial cellulitis    Class 1 obesity with serious comorbidity and body mass index (BMI) of 32.0 to 32.9 in adult    Dental abscess    Hypoxia    Subcutaneous emphysema (H)    Borderline personality disorder (H)    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)    Esophageal stricture    Obstruction of esophagus    HTN (hypertension)    Interstitial lung disease (H)    Moderate persistent asthma without complication     Onychomycosis    Restless leg syndrome    Seasonal allergies    Stress    Respiratory bronchiolitis interstitial lung disease (H)    Spinal epidural abscess    Lumbar spondylosis    Inflammatory arthritis    Arthralgia of temporomandibular joint    Articular disc disorder of temporomandibular joint    Derangement of temporomandibular joint    Calculus of gallbladder without cholecystitis without obstruction    Displacement of articular disc of temporomandibular joint with reduction    Myofascial pain    Oral lesion    Symptomatic cholelithiasis    Sacro-iliac pain    Degenerative lumbar spinal stenosis    Glucose intolerance    Chronic neck pain    Lumbar radiculopathy, chronic    Cervical radiculopathy    Acute pain of right shoulder    Other osteoporosis without current pathological fracture    Diaphragmatic hernia    Bipolar 2 disorder (H)    Choking sensation    Gastro-esophageal reflux disease with esophagitis    Limited opening of mandible    History of pelvic surgery    Voiding dysfunction    Pelvic floor dysfunction    Urinary hesitancy    Urinary frequency    S/P spinal fusion    Cannabis abuse, daily use   Diabetes: no    Progress on goals from last visit: Patient is noticing a fluctuation of 5-7 lbs within the past couple of months. Patient is noticing an increasing in cravings with CHO. Discussed th importance of having lunch to limit afternoon cravings and fueling the weight loss process. Discussed the importance of hydration for weight loss.  Goals:  Try having a bottle of water between every can of pop  Aim for three meals per day  Pair a protein and complex CHO together when having snacks    Dietary Recall:  Breakfast: protein shake (30 grams)  Lunch: applesauce OR greek yogurt  Snack: pb sandwich OR various CHO items  Dinner: protein, potato/rice and larger portion of vegetables  Evening snack: popcorn  Beverages: Goal of 90 ounces/day  Coffee  Zero sugar mountain dew - at 3 cans/day  Water (zero  sugar flavor) - 3-4 (48-64 ounces total) 16 ounce bottles  Exercise:   Physical therapy d/t back surgery - will start restarting this to help build her core and for neck strength - patient has been having arthritis issues  Walking and walking the dogs - doing this a little less consistent d/t her posture while walking  -patient plans to have shoulder surgery in February 2025  Nutrition Diagnosis:    Obesity (NC 3.3) related to overeating and poor lifestyle habits as evidenced by patient's subjective statements and BMI of 30.89      Intervention:  Food and/or nutrient delivery: having consistent meals during the day to fuel weight loss process. Increasing fluid intake. Pairing protein and fibrous items together (fruit, vegetables and/or whole grain)  Nutrition education: high protein cereal options    Monitoring/Evaluation:    Goals:  Increase fluid intake by 1/2 water bottle every few days - goal of 90 ounces/day  Increase consistency with lunch intake  Pair protein and fibrous items together with meals    Patient to follow up in 3 month(s) with DANIEL      Video-Visit Details    Type of service:  Video Visit    Video Start Time (time video started): 8:31 AM    Video End Time (time video stopped): 8:49 AM    Originating Location (pt. Location): Home      Distant Location (provider location):  Off-site    Mode of Communication:  Video Conference via [facility]    Physician has received verbal consent for a Video Visit from the patient? Yes      Evelia Pope RD

## 2024-11-15 NOTE — TELEPHONE ENCOUNTER
OK for medication for claustrophobia. Called Pt and left VM to see what pharmacy they would like it sent to.    Jimmie Landry, RNCC

## 2024-11-18 ENCOUNTER — MYC REFILL (OUTPATIENT)
Dept: INTERNAL MEDICINE | Facility: CLINIC | Age: 54
End: 2024-11-18
Payer: COMMERCIAL

## 2024-11-18 DIAGNOSIS — G25.81 RESTLESS LEG SYNDROME: ICD-10-CM

## 2024-11-19 ENCOUNTER — VIRTUAL VISIT (OUTPATIENT)
Dept: SURGERY | Facility: CLINIC | Age: 54
End: 2024-11-19
Payer: COMMERCIAL

## 2024-11-19 DIAGNOSIS — E66.9 OBESITY (BMI 30-39.9): Primary | ICD-10-CM

## 2024-11-19 DIAGNOSIS — Z71.3 NUTRITIONAL COUNSELING: ICD-10-CM

## 2024-11-19 PROCEDURE — 97803 MED NUTRITION INDIV SUBSEQ: CPT | Mod: 95 | Performed by: DIETITIAN, REGISTERED

## 2024-11-19 RX ORDER — PREGABALIN 150 MG/1
CAPSULE ORAL
Qty: 60 CAPSULE | Refills: 5 | Status: SHIPPED | OUTPATIENT
Start: 2024-11-21

## 2024-11-19 NOTE — PATIENT INSTRUCTIONS
High Protein Cereal Options:   Kashi Go: 9-14 g protein per serving (depending on flavor)  Premier Protein Cereal: 20 g protein per serving  Special K High Protein: 10 - 20 g per serving  Magic Spoon: 12-14 g per serving (depending on flavor)  :ratio Brand: 10 g protein per serving  Cataline Crunch: 9 g per serving         150 Calories or Less Snack Ideas (double for meals)  1 hardboiled egg with   cup berries  1 small apple with 1 hardboiled egg  10 almonds with   cup berries  2 clementines with 1 light string cheese  1 light string cheese with   sliced apple  1 light string cheese wrapped in 2 slices of turkey  5 100% whole wheat crackers (e.g. Triscuit) with 1 light string cheese    c. cottage cheese with   cup fruit and 1 Tbsp sunflower seeds     cup cottage cheese with   of an avocado     can tuna fish with 1 cup sliced cucumbers   2 oz turkey slices with 1 cup carrots  1 container (6 oz) of low sugar (less than 10 grams of sugar) greek yogurt   3 Tablespoons of hummus with 1 cup sliced bell peppers, carrots or vegetable of your choice  4 Tablespoons ranch dip made with plain Greek Yogurt and 3 mini cucumbers  1/4 cup nuts (any kind)  1 Tablespoon peanut butter with 1 stalk celery   1 dill pickle wrapped in 1-2 slices of deli ham with 1 tsp of light cream cheese  5 100% whole wheat crackers (e.g. Triscuit) with 1 tsp each of guacamole/avocado topped with a cherry tomato - season with pepper or Everything Bagel Seasoning

## 2024-11-19 NOTE — LETTER
11/19/2024      Zayra Ramirez  [address] [place] [address]      Dear Colleague,    Thank you for referring your patient, Zayra Ramirez, to the [facility] [place]. Please see a copy of my visit note below.    Zayra Ramirez is a 54 year old who is being evaluated via a billable video visit.      How would you like to obtain your AVS? MyChart  If the video visit is dropped, the invitation should be resent by: Text to cell phone: [phone]  Will anyone else be joining your video visit? No        Medical  Weight Loss Follow-Up Diet Evaluation  Assessment:  Zayra is presenting today for a follow up weight management nutrition consultation.  This patient has had an initial appointment and was referred by Dr. Hong  for MNT as treatment for Obesity   Weight loss medication:  none at this time .   Pt's weight is 180 lbs (10/16/2024)  Initial weight: 219 lbs  Weight change: 39 lbs, 17.8% weight loss        9/20/2023    10:06 AM   Changes and Difficulties   I have made the following changes to my diet since my last visit: lowered carbs   With regards to my diet, I am still struggling with: enough protein   I have made the following changes to my activity/exercise since my last visit: increased walking   With regards to my activity/exercise, I am still struggling with: walking over a mile atatime     BMI: 30.89  Ideal body weight: 54.7 kg (120 lb 9.5 oz)  Adjusted ideal body weight: 65.5 kg (144 lb 5.7 oz)    Estimated RMR ([place]-St Jeor equation):   1,440 kcals x 1.2 (sedentary) = 1,725 kcals (for weight maintenance)  Recommended Protein Intake: 60-80 grams of protein/day  Patient Active Problem List:  Patient Active Problem List   Diagnosis     Chronic bilateral low back pain without sciatica     Facial cellulitis     Class 1 obesity with serious comorbidity and body mass index (BMI) of 32.0 to 32.9 in adult     Dental abscess     Hypoxia      Subcutaneous emphysema (H)     Borderline personality disorder (H)     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)     Esophageal stricture     Obstruction of esophagus     HTN (hypertension)     Interstitial lung disease (H)     Moderate persistent asthma without complication     Onychomycosis     Restless leg syndrome     Seasonal allergies     Stress     Respiratory bronchiolitis interstitial lung disease (H)     Spinal epidural abscess     Lumbar spondylosis     Inflammatory arthritis     Arthralgia of temporomandibular joint     Articular disc disorder of temporomandibular joint     Derangement of temporomandibular joint     Calculus of gallbladder without cholecystitis without obstruction     Displacement of articular disc of temporomandibular joint with reduction     Myofascial pain     Oral lesion     Symptomatic cholelithiasis     Sacro-iliac pain     Degenerative lumbar spinal stenosis     Glucose intolerance     Chronic neck pain     Lumbar radiculopathy, chronic     Cervical radiculopathy     Acute pain of right shoulder     Other osteoporosis without current pathological fracture     Diaphragmatic hernia     Bipolar 2 disorder (H)     Choking sensation     Gastro-esophageal reflux disease with esophagitis     Limited opening of mandible     History of pelvic surgery     Voiding dysfunction     Pelvic floor dysfunction     Urinary hesitancy     Urinary frequency     S/P spinal fusion     Cannabis abuse, daily use   Diabetes: no    Progress on goals from last visit: Patient is noticing a fluctuation of 5-7 lbs within the past couple of months. Patient is noticing an increasing in cravings with CHO. Discussed th importance of having lunch to limit afternoon cravings and fueling the weight loss process. Discussed the importance of hydration for weight loss.  Goals:  Try having a bottle of water between every can of pop  Aim for three meals per day  Pair a protein and complex CHO together when having  snacks    Dietary Recall:  Breakfast: protein shake (30 grams)  Lunch: applesauce OR greek yogurt  Snack: pb sandwich OR various CHO items  Dinner: protein, potato/rice and larger portion of vegetables  Evening snack: popcorn  Beverages: Goal of 90 ounces/day  Coffee  Zero sugar mountain dew - at 3 cans/day  Water (zero sugar flavor) - 3-4 (48-64 ounces total) 16 ounce bottles  Exercise:   Physical therapy d/t back surgery - will start restarting this to help build her core and for neck strength - patient has been having arthritis issues  Walking and walking the dogs - doing this a little less consistent d/t her posture while walking  -patient plans to have shoulder surgery in February 2025  Nutrition Diagnosis:    Obesity (NC 3.3) related to overeating and poor lifestyle habits as evidenced by patient's subjective statements and BMI of 30.89      Intervention:  Food and/or nutrient delivery: having consistent meals during the day to fuel weight loss process. Increasing fluid intake. Pairing protein and fibrous items together (fruit, vegetables and/or whole grain)  Nutrition education: high protein cereal options    Monitoring/Evaluation:    Goals:  Increase fluid intake by 1/2 water bottle every few days - goal of 90 ounces/day  Increase consistency with lunch intake  Pair protein and fibrous items together with meals    Patient to follow up in 3 month(s) with DANIEL      Video-Visit Details    Type of service:  Video Visit    Video Start Time (time video started): 8:31 AM    Video End Time (time video stopped): 8:49 AM    Originating Location (pt. Location): Home      Distant Location (provider location):  Off-site    Mode of Communication:  Video Conference via [facility]    Physician has received verbal consent for a Video Visit from the patient? Yes      Evelia Pope RD           Again, thank you for allowing me to participate in the care of your patient.        Sincerely,        Evelia Pope RD

## 2024-11-20 NOTE — TELEPHONE ENCOUNTER
Patient called back, [facility] [place]  [facility] [place], [place] - [address]

## 2024-11-21 RX ORDER — DIAZEPAM 5 MG/1
TABLET ORAL
Qty: 2 TABLET | Refills: 0 | Status: SHIPPED | OUTPATIENT
Start: 2024-11-21

## 2024-12-04 ENCOUNTER — HOSPITAL ENCOUNTER (OUTPATIENT)
Dept: MRI IMAGING | Facility: CLINIC | Age: 54
Discharge: HOME | End: 2024-12-04
Attending: ORTHOPAEDIC SURGERY
Payer: COMMERCIAL

## 2024-12-04 ENCOUNTER — HOSPITAL ENCOUNTER (OUTPATIENT)
Dept: CT IMAGING | Facility: CLINIC | Age: 54
Discharge: HOME | End: 2024-12-04
Attending: ORTHOPAEDIC SURGERY
Payer: COMMERCIAL

## 2024-12-04 DIAGNOSIS — M19.012 PRIMARY OSTEOARTHRITIS OF LEFT SHOULDER: ICD-10-CM

## 2024-12-04 PROCEDURE — 73200 CT UPPER EXTREMITY W/O DYE: CPT | Mod: LT

## 2024-12-04 PROCEDURE — 73221 MRI JOINT UPR EXTREM W/O DYE: CPT | Mod: LT

## 2024-12-11 NOTE — TELEPHONE ENCOUNTER
PAC appt scheduled for 1-7-25 at 9:30am.    Mia Mason  Surgery Scheduling Coordinator  Ph: [phone]

## 2024-12-12 NOTE — TELEPHONE ENCOUNTER
FUTURE VISIT INFORMATION      SURGERY INFORMATION:  Date: 25  Location: [facility] OR  Surgeon:  Nataliia Dixon MD   Anesthesia Type:  General with block  Procedure: ARTHROPLASTY, SHOULDER, TOTAL possible reverse   Consult: virtual visit 24    RECORDS REQUESTED FROM:       Primary Care Provider: Neena Tam APRN [facility]    Pertinent Medical History: hypoxia, hypertension    Most recent EKG+ Tracin21

## 2024-12-12 NOTE — TELEPHONE ENCOUNTER
FUTURE VISIT INFORMATION        SURGERY INFORMATION:  Date: 25  Location: [facility]  Surgeon:  Nataliia Dixon MD   Anesthesia Type:  General with block  Procedure: ARTHROPLASTY, SHOULDER, TOTAL possible reverse   Consult: virtual visit 24     RECORDS REQUESTED FROM:         Primary Care Provider: Neena Tam APRN [facility]     Pertinent Medical History: hypoxia, hypertension     Most recent EKG+ Tracin21

## 2024-12-12 NOTE — TELEPHONE ENCOUNTER
FUTURE VISIT INFORMATION        SURGERY INFORMATION:  Date: 25  Location: [facility]  Surgeon:  Nataliia Dixon MD   Anesthesia Type:  General with block  Procedure: ARTHROPLASTY, SHOULDER, TOTAL possible reverse   Consult: virtual visit 24     RECORDS REQUESTED FROM:         Primary Care Provider: Neena Tam APRN [facility]     Pertinent Medical History: hypoxia, hypertension     Most recent EKG+ Tracin21

## 2024-12-16 ENCOUNTER — TRANSFERRED RECORDS (OUTPATIENT)
Dept: HEALTH INFORMATION MANAGEMENT | Facility: CLINIC | Age: 54
End: 2024-12-16

## 2024-12-31 ASSESSMENT — PAIN SCALES - PAIN ENJOYMENT GENERAL ACTIVITY SCALE (PEG)
AVG_PAIN_PASTWEEK: 8
AVG_PAIN_PASTWEEK: 8
INTERFERED_ENJOYMENT_LIFE: 5
INTERFERED_GENERAL_ACTIVITY: 7
PEG_TOTALSCORE: 6.67
PEG_TOTALSCORE: 6.67
INTERFERED_GENERAL_ACTIVITY: 7
INTERFERED_ENJOYMENT_LIFE: 5

## 2024-12-31 ASSESSMENT — ANXIETY QUESTIONNAIRES
GAD7 TOTAL SCORE: 8
5. BEING SO RESTLESS THAT IT IS HARD TO SIT STILL: SEVERAL DAYS
GAD7 TOTAL SCORE: 8
2. NOT BEING ABLE TO STOP OR CONTROL WORRYING: SEVERAL DAYS
6. BECOMING EASILY ANNOYED OR IRRITABLE: SEVERAL DAYS
IF YOU CHECKED OFF ANY PROBLEMS ON THIS QUESTIONNAIRE, HOW DIFFICULT HAVE THESE PROBLEMS MADE IT FOR YOU TO DO YOUR WORK, TAKE CARE OF THINGS AT HOME, OR GET ALONG WITH OTHER PEOPLE: SOMEWHAT DIFFICULT
8. IF YOU CHECKED OFF ANY PROBLEMS, HOW DIFFICULT HAVE THESE MADE IT FOR YOU TO DO YOUR WORK, TAKE CARE OF THINGS AT HOME, OR GET ALONG WITH OTHER PEOPLE?: SOMEWHAT DIFFICULT
1. FEELING NERVOUS, ANXIOUS, OR ON EDGE: SEVERAL DAYS
7. FEELING AFRAID AS IF SOMETHING AWFUL MIGHT HAPPEN: SEVERAL DAYS
4. TROUBLE RELAXING: MORE THAN HALF THE DAYS
7. FEELING AFRAID AS IF SOMETHING AWFUL MIGHT HAPPEN: SEVERAL DAYS
GAD7 TOTAL SCORE: 8
3. WORRYING TOO MUCH ABOUT DIFFERENT THINGS: SEVERAL DAYS

## 2025-01-04 NOTE — PROGRESS NOTES
Date:1/6/2025      COMPREHENSIVE PAIN CLINIC INITIAL EVALUATION    I had the pleasure of meeting Ms. Zayra Ramirez on 1/6/2025 in the [facility] in consult for Dr. Dixon with regards to her pain.  The patient is a 54 year old female with past medical history of L) shoulder OA, lumbar spondylosis, inflammatory arthritis, TMJ pain, myalgias, chronic neck pain, lumbar radiculopathy, cervical radiculopathy, ROBERT,obesity, bipolar 2 disorder, borderline personality disorder, daily cannabis use, chronic intractable pain who presents for evaluation of chronic pain.      History of of chronic pain on initial exam 1/6/2025                               Subjective:  She presents alone.    Patient endorses chronic pain in L) shoulder that started a few years ago without a precipitating event.  She is scheduled to have L) shoulder surgery 2/4/2024  Patient has numbness and tingling in b/l hands which is intermittent.  Patient had 1 surgery in cervical spine 2/2024 and 1 surgery in lumbar spine by Dr. Wilhelm 11/2022. She has not had any joint surgery in the past.  The patient describes the L) shoulder pain as constant that varies in intensity, aching, shooting, pressure, throbbing.  She reports that the pain is made worse by any movement.  Her pain is improved with keeping her arm close to her body.   She rates her currenty pain score at 5/10, but it can be as low as 3/10 or as severe as 10/10.  Physical therapy was not advised.   She was told she need to be seen at the pain clinic for short prescription of opioids prior to her shoulder surgery.    Patient endorses anxiety and depression.  Patient does follow with a mental health care provider every 3-6 months.  Patient exercises by walking.    Patient follows with Pulmonology, Rheumatology.      Progress Notes Reviewed:  11/19/2024 Evelia Pope RD - weight mgmt and nutrition consult  11/14/2024 Dr. Nataliia Dixon, Orthopaedic Surgery - L) shoulder OA, patient would  like to pursue surgical intervention rather than injection treatment.  11/11/2024 Dr. Jennifer Bonilla, Pulmonary Medicine  11/01/2024 ED - SOB  10/23/2024 Dr. Bernardo Dixon, Ortho Sports Medicine - L) shoulder pain    She denies any new problems with falls or balance, any new numbness or weakness of the arms or legs, any new bowel or bladder incontinence, any night sweats or unexplained fevers, or any sudden or unexpected weight loss.  He denies saddle anesthesia. He denies changes in gait, instability, or falling episodes.     Zayra Ramirez has been seen at a pain clinic in the past.        Current Treatments:  Pregabalin 150mg BID - for RLS - 60 tabs for 30 days last filled on 12/18/2024  Acetaminphen 500mg 1-2 tabs  Abilify 5mg q hs - per psychiatry  Buspar 30mg daily  Celebrex 100mg daily for RA  Clonidine 0.1mg prn not daily - per psychiatry  Flexeril 10mg 1 tab q hs  Duloxetine 60mg daily - per psychiatry  Plaquenil 200mg for RA  Stool softeners prn  Narcan prn    NOTE:  hydrocodone-acet causes itching    Anticoagulation:  none      Previous Medication Treatments Included:  Anti-convulsants: gabapentin for mood stabilizer years ago  Muscle relaxors: no  Anti-depressants: no  Benzodiazapine's: no  Acetaminophen/NSAIDs: not effective  Topicals: Lidocaine patch prn  Opioids: oxycodone      Other Treatments Have Included:  Physical therapy: not advised for shoulder pain  Pain Psychology: no  Chiropractic: no  Acupuncture: yes for shoulder temporary benefit  TENs Unit: no  Injections: L) shoulder injections with Dr. James, FAMILIA L5-S. Dr. Florian 9/15.2020  Surgeries: cervical fusion surgery with Dr. Wilhelm on 2/2024  Dry Needling: no  Massage:no    Implantable devices:  none      Past Medical History:  Medical history reviewed.  Past Medical History:   Diagnosis Date    Allergic rhinitis     Anemia     Arthritis     Asthma     copd    Cervical pseudoarthrosis, sequela     Dental abscess 08/2015    Depressive disorder      Diaphragmatic hernia 07/11/2016    Gastroesophageal reflux disease     History of emphysema (H)     Hoarseness     Hypertension     Obstructive sleep apnea     Other chronic pain     Primary osteoarthritis of left shoulder     Respiratory bronchiolitis interstitial lung disease (H)     Sleep apnea     Smoker 11/02/2015      Patient Active Problem List   Diagnosis    Chronic bilateral low back pain without sciatica    Facial cellulitis    Class 1 obesity with serious comorbidity and body mass index (BMI) of 32.0 to 32.9 in adult    Dental abscess    Hypoxia    Subcutaneous emphysema (H)    Borderline personality disorder (H)    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)    Esophageal stricture    Obstruction of esophagus    HTN (hypertension)    Interstitial lung disease (H)    Moderate persistent asthma without complication    Onychomycosis    Restless leg syndrome    Seasonal allergies    Stress    Respiratory bronchiolitis interstitial lung disease (H)    Spinal epidural abscess    Lumbar spondylosis    Inflammatory arthritis    Arthralgia of temporomandibular joint    Articular disc disorder of temporomandibular joint    Derangement of temporomandibular joint    Calculus of gallbladder without cholecystitis without obstruction    Displacement of articular disc of temporomandibular joint with reduction    Myofascial pain    Oral lesion    Symptomatic cholelithiasis    Sacro-iliac pain    Degenerative lumbar spinal stenosis    Glucose intolerance    Chronic neck pain    Lumbar radiculopathy, chronic    Cervical radiculopathy    Acute pain of right shoulder    Other osteoporosis without current pathological fracture    Diaphragmatic hernia    Bipolar 2 disorder (H)    Choking sensation    Gastro-esophageal reflux disease with esophagitis    Limited opening of mandible    History of pelvic surgery    Voiding dysfunction    Pelvic floor dysfunction    Urinary hesitancy    Urinary frequency    S/P spinal fusion     Cannabis abuse, daily use         Past Surgical History:  Pertinent surgical history reviewed.  Past Surgical History:   Procedure Laterality Date    CERVICAL FUSION      COLONOSCOPY      COLONOSCOPY N/A 02/06/2020    Procedure: COLONOSCOPY, WITH POLYPECTOMY AND BIOPSY;  Surgeon: Julian Mccullough MD;  Location:  GI    CYSTOSCOPY      ENT SURGERY      ESOPHAGOSCOPY, GASTROSCOPY, DUODENOSCOPY (EGD), COMBINED N/A 02/06/2020    Procedure: ESOPHAGOGASTRODUODENOSCOPY (EGD);  Surgeon: Julian Mccullough MD;  Location:  GI    EXCISE LESION INTRAORAL Bilateral 10/03/2018    Procedure: EXCISE LESION INTRAORAL;  Wide Local Excision Of of Left Oral Cavity Ulcer;  Surgeon: Morro Mijares MD;  [facility]    GYN SURGERY      HC DRAIN SKIN ABSCESS SIMPLE/SINGLE  03/16/2012    Procedure:INCISION AND DRAINAGE, ABSCESS, SIMPLE; Surgeon:CHRISTIANO HANCOCK; Location: GI    HEAD & NECK SURGERY      HYSTERECTOMY      HYSTERECTOMY      INCISION AND DRAINAGE ABDOMEN WASHOUT, COMBINED      INJECT EPIDURAL CERVICAL N/A 10/14/2021    Procedure: Cervical 7- Thoracic 1 epidural steroid injection with fluoroscopy;  Surgeon: Tram Florian MD;  Location: [facility] OR    INJECT EPIDURAL LUMBAR Right 09/15/2020    Procedure: Lumbar5- sacral 1 epidural steroid injection with fluoroscopy;  Surgeon: Tram Florian MD;  Location: [facility]    INJECT EPIDURAL LUMBAR Right 06/29/2021    Procedure: Lumbar 5 sacral 1 epidural steroid injection with fluoroscopy;  Surgeon: Tram Florian MD;  Location: [facility] OR    INJECT SACROILIAC JOINT Bilateral 06/16/2020    Procedure: Bilateral sacroiliac joint steroid injection with fluoroscopy;  Surgeon: Tram Florian MD;  Location: [facility]    LAMINECTOMY THORACIC ONE LEVEL N/A 08/19/2019    Procedure: LAMINECTOMY, SPINE, THORACIC, 11-12 and Part of Lumbar 1, DRAINAGE OF EPIDURAL ABCESS, Epidural Drain Placement X 2;  Surgeon: Yadiel Beal MD;  Location:  OR    OPTICAL TRACKING  SYSTEM FUSION POSTERIOR CERVICAL THREE + LEVELS N/A 2/12/2024    Procedure: Posterior instrumented spinal fusion cervical 2-5; laminectomies cervical 3-4; use of local autograft and crushed cancellous allograft.;  Surgeon: Philip Wilhelm MD;  Location:  OR    OPTICAL TRACKING SYSTEM FUSION SPINE POSTERIOR LUMBAR THREE+ LEVELS N/A 11/30/2022    Procedure: Posterior Instrumented Spinal Fusion Thoracic 8 to Sacrum with Bilateral Pelvic Fixation; Transforaminal Lumbar Interbody Fusion with Erickson-Nixon Osteotomy Lumbar 1 to Sacral 1 (5 levels); Use of Infuse Bone Morphogenetic Protein Large Kit and Allograft;  Surgeon: Philip Wilhelm MD;  Location:  OR    ORTHOPEDIC SURGERY      ME PERCUT IMPLNT NEUROELECT,EPIDURAL N/A 08/08/2019    Procedure: TRIAL, SPINAL CORD STIMULATOR WITH [employer];  Surgeon: Sipple, Daniel Peter, DO;  Location: [facility];  Service: Pain    RADIO FREQUENCY ABLATION / DESTRUCTION OF SACROILOAC JOINT DORSAL PRIMARY RAMUS Bilateral 12/17/2019    Procedure: Bilateral lumbar radiofrequency ablation with fluoroscopy and intravenous sedation ( Lumbar 2,3,4,5 medial branch nerves for the bilateral lumbar3-4, 4-5 and 5-sacral1 joints.;  Surgeon: Tram Florian MD;  Location:  OR    RADIO FREQUENCY ABLATION / DESTRUCTION OF SACROILOAC JOINT DORSAL PRIMARY RAMUS Right 11/17/2020    Procedure: Right lumbar medial branch nerve radiofrequency ablation right L2,3,4,5 nerves supplying the right L3-4, L4-5 and L5-S1 facet joints;  Surgeon: Tram Florian MD;  Location: [facility] [place]    spinal cord stimulator  08/08/2019    spinal cord stimulator removal  08/13/2019          Medications: Pertinent medications reviewed.  Current Outpatient Medications   Medication Sig Dispense Refill    acetaminophen (TYLENOL) 500 MG tablet Take 1-2 tablets (500-1,000 mg) by mouth every 6 hours as needed for mild pain 100 tablet 1    albuterol (PROAIR HFA/PROVENTIL HFA/VENTOLIN HFA) 108 (90  Base) MCG/ACT inhaler Inhale 2 puffs into the lungs every 6 hours as needed for shortness of breath / dyspnea or wheezing Ventolin please 18 g 2    albuterol (PROVENTIL) (2.5 MG/3ML) 0.083% neb solution INHALE 3 ML VIA A NEBULIZER 4 TIMES DAILY IF NEEDED.      ARIPiprazole (ABILIFY) 5 MG tablet Take 5 mg by mouth every morning      azelastine (ASTELIN) 0.1 % nasal spray Spray 2 sprays in nostril 2 times daily      busPIRone HCl (BUSPAR) 30 MG tablet Take 30 mg by mouth 2 times daily       celecoxib (CELEBREX) 100 MG capsule TAKE 1 CAPSULE (100 MG) BY MOUTH 2 TIMES DAILY. AS NEEDED FOR JOINT PAIN 60 capsule 2    cloNIDine (CATAPRES) 0.1 MG tablet Take 1 tablet by mouth as needed      cyclobenzaprine (FLEXERIL) 10 MG tablet Take 1 tablet (10 mg) by mouth at bedtime. 90 tablet 1    diazepam (VALIUM) 5 MG tablet Take 1 tab as directed by CT/MRI staff. May repeat Once as needed 2 tablet 0    DULoxetine (CYMBALTA) 60 MG capsule Take 120 mg by mouth At Bedtime       EPINEPHrine (ANY BX GENERIC EQUIV) 0.3 MG/0.3ML injection 2-pack Inject 0.3 mLs (0.3 mg) into the muscle as needed for anaphylaxis May repeat one time in 5-15 minutes if response to initial dose is inadequate. 2 each 1    hydroxychloroquine (PLAQUENIL) 200 MG tablet Take 1 tablet (200 mg) by mouth 2 times daily 180 tablet 3    insulin pen needle (32G X 4 MM) 32G X 4 MM miscellaneous Use 1 NEEDLE WEEKLY 12 each 3    Lidocaine (LIDOCARE) 4 % Patch Place 1 patch onto the skin every 24 hours To prevent lidocaine toxicity, patient should be patch free for 12 hrs daily.Do not place directly over the incision. 15 patch 3    liraglutide - Weight Management (SAXENDA) 18 MG/3ML pen INJECT 3 MG SUBCUTANEOUS DAILY FOR 90 DAYS 45 mL 0    methocarbamol (ROBAXIN) 750 MG tablet Take 1 tablet (750 mg) by mouth 3 times daily 35 tablet 2    montelukast (SINGULAIR) 10 MG tablet Take 1 tablet by mouth At Bedtime      naloxone (NARCAN) 4 MG/0.1ML nasal spray Spray 1 spray (4 mg)  into one nostril alternating nostrils as needed for opioid reversal every 2-3 minutes until assistance arrives 0.2 mL 0    omeprazole (PRILOSEC) 40 MG DR capsule Take 1 capsule (40 mg) by mouth daily. 90 capsule 3    OXYGEN-HELIUM IN 3L @ night    Oxygen only not helium      polyethylene glycol (MIRALAX) 17 GM/Dose powder Take 17 g by mouth daily      predniSONE 5 MG (21) TBPK Take 5 mg by mouth daily      pregabalin (LYRICA) 150 MG capsule Take 1 capsule by mouth in the morning and around 1 PM 60 capsule 5    pregabalin (LYRICA) 150 MG capsule Take 1 capsule by mouth in the morning and around 1 PM 60 capsule 3    Respiratory Therapy Supplies ([facility] CPAP FILTER) MISC       rOPINIRole (REQUIP) 0.5 MG tablet Take 1 tablet (0.5 mg) by mouth at bedtime. 90 tablet 1    SENNA-docusate sodium (SENNA S) 8.6-50 MG tablet Take 1 tablet by mouth at bedtime 30 tablet 2    STIOLTO RESPIMAT 2.5-2.5 MCG/ACT AERS Inhale 2 puffs into the lungs every morning         [place] Prescription Monitoring Program reviewed 1/4/2025.  No concern for abuse or misuse of controlled medications based on this report.  12/18/2024 Pregabalin 150mg 60 tabs for 30 days  11/21/2024 Diazepam 5mg 2 tabs for 2 days for imaging  09/24/2024 Oxycodone 5mg 10 tabs for 5 days  03/15/2024 Hydromorphone 4mg 42 tabs for 7 days  02/26/2024 Oxycodone 5mg 56 tabs for 7 days  02/16/2024 Oxycodone 10mg 40 tabs for 5 days    Monthly scripts for pregabalin  No other scripts on  in 2024.      Allergies: Pertinent allergies reviewed.     Allergies   Allergen Reactions    Bee Venom Anaphylaxis    Doxycycline Anaphylaxis     Patient thinks it may have been just nausea and vomiting, however unable to confirm  Other reaction(s): throat closes    Erythromycin      Other reaction(s): Vomiting      Hydrocodone-Acetaminophen Itching       Family History:   family history includes Anxiety Disorder in her father; Asthma in her mother; Back Pain in her father; Breast  Cancer (age of onset: 55) in her maternal aunt; Coronary Artery Disease in her father; Depression in her father; Diabetes in her father; Hypertension in her father; Osteoporosis in her father; Other Cancer in her father; Restless Leg Syndrome in her father, mother, and sister.    Social History:   She is single and lives in a house in [place], [place].  She has 2 children.She does respite care. She is getting  1/23/2024.  She uses recreational cannabis which helps with her pain.  Patient is independent in ADL's.  She has 2 dogs.  She enjoys gardening.  She  reports that she quit smoking about 3 years ago. Her smoking use included cigarettes. She started smoking about 29 years ago. She has a 52.5 pack-year smoking history. She quit smokeless tobacco use about 3 years ago. She reports current drug use. Drug: Marijuana. She reports that she does not drink alcohol.  Social History     Social History Narrative    Not on file         Review of Systems:      (Positive responses bolded)  GENERAL: fever/chills, fatigue, general unwell feeling, weight gain/loss  HEAD/EYES:  headache, dizziness, or vision changes  EARS/NOSE/THROAT: nosebleeds, hearing loss, sinus infection, earache, tinnitus  IMMUNE:  allergies, cancer, immune deficiency, or infections  SKIN:  itching, rash, hives  HEME/Lymphatic: anemia, easy bruising, easy bleeding  RESPIRATORY: cough, wheezing, or shortness of breath  CARDIOVASCULAR/Circulation: extremity edema, syncope, hypertension, tachycardia, or angina  GASTROINTESTINAL: abdominal pain, nausea/emesis, diarrhea, constipation, hematochezia, or melena  ENDOCRINE:  diabetes, steroid use, thyroid disease or osteoporosis  MUSCULOSKELETAL: myalgias, joint pain, stiffness, neck pain, back pain, arthritis, or gout  GENITOURINARY: frequency, urgency, dysuria, difficulty voiding, hematuria or incontinence  NEUROLOGIC: weakness, numbness, paresthesias, seizure, tremor, stroke or memory loss  PSYCHIATRIC:  depression, anxiety, stress, suicidal thoughts/attempts or mood swings      Physical Exam:  BP (!) 157/89 (BP Location: Right arm, Patient Position: Chair, Cuff Size: Adult Regular)   Pulse 67   LMP  (LMP Unknown)   SpO2 92%       Constitutional: She is oriented to person, place, and time.  She is obese. She is not in acute distress.   HENT:     Head: Normocephalic and atraumatic.     Eyes: Pupils are equal, round, and reactive to light. EOM are normal. No scleral icterus.   Pulmonary/Chest:  NWOB. No respiratory distress.   Neurological: She is alert and oriented to person, place, and time. Coordination grossly normal.    Skin: Skin is warm and dry. She is not diaphoretic.   Psychiatric: She has a normal mood and affect. Her behavior is normal. Judgment and thought content normal.  Patient answers questions appropriately.  MSK: Gait is normal.  L) should ROM is limited and painful in all planes.        DIRE Score for ongoing opioid management is calculated as follows:    Diagnosis = 2    Intractability = 2    Risk: Psych = 2  Chem Hlth = 3  Reliability = 3  Social = 3    Efficacy = 2    Total DIRE Score = 17 (14 or higher predicts good candidate for ongoing opioid management; 13 or lower predicts poor candidate for opioid management)         Imaging:  EXAM: MR left shoulder without  contrast 12/5/2024 9:24 AM     TECHNIQUE: Multiplanar, multisequence imaging of the left shoulder  were obtained without administration of intravenous or intra-articular  gadolinium contrast using routine protocol.     History: Shoulder pain; Preoperative planning; No known/automatically  detected potential contraindications to MRI; Primary osteoarthritis of  left shoulder     Comparison: CT 12/4/2024, radiographs 9/13/2024     Findings:     Motion despite repeated attempts compromise assessment.     ROTATOR CUFF and ASSOCIATED STRUCTURES  Rotator cuff: On a background of tendinosis, low grade articular sided  tear of the  supraspinatus middle fibers at the footprint involving  approximately 6 mm in anterior posterior dimension. Infraspinatus and  subscapularis tendinosis. Teres minor tendon is intact.     Bursa: Small subacromial/subdeltoid bursal fluid.     Musculature: Muscle bulk of rotator cuff is preserved.  Deltoid muscle  bulk is also preserved. Partially visualized edema along the superior  aspect of the teres major muscle medially, presumably muscle strain.      Acromioclavicular joint  There are mild degenerative changes of the acromioclavicular joint.  Mild to moderate widening of acromioclavicular joint space with small  to moderate acromioclavicular joint effusion. Acromion is type 1 in  sagittal morphology.  Coracoacromial ligament is not thickened.     OSSEOUS STRUCTURES  No fracture, marrow contusion or marrow infiltration. Diffuse mild T1  hypointense marrow signal alteration with more confluent area of T2  hyperintensity, presumably related to red marrow conversion.     LONG BICIPITAL TENDON  The long head of the biceps tendon is normally situated within the  bicipital groove. Query severe tendinosis at the bicipital labral  anchor region. No complete or partial biceps tendon tear is present.     GLENOHUMERAL JOINT  Joint fluid: Large glenohumeral joint effusion with associated  synovial proliferation.     Cartilage and subarticular bone:  Diffuse area of full-thickness  chondral loss at the glenohumeral joint with associated extensive  subchondral cystlike changes at the humeral head with subjacent edema  like marrow signal intensity. No Hill-Sachs, reverse Hill-Sachs, or  bony Bankart lesions are seen.     Labrum: Limited assessment on this study with motion shows posterior  inferior labral tear.     ANCILLARY FINDINGS:                                                                      Impression:  Study compromised by motion.  1. Low grade articular sided tear of the supraspinatus middle fibers  at the  footprint.  2. Severe glenohumeral joint osteoarthritis.   *  Large glenohumeral joint effusion.  *  Posteroinferior labral tear.  3. Mild acromioclavicular joint degenerative change with widening of  AC joint space.  4. Query severe tendinosis at the bicipital labral anchor region.   5. Relatively prominent for age red marrow reconversion, nonspecific  but can be seen in the setting of high endurance athlete, severe  chronic anemia, cigarette smoking, obesity, chronic respiratory  disease and other etiologies.     VIJAY RIVERA          EMG:  na      Diagnosis:  (M25.512,  G89.29) Chronic left shoulder pain  (primary encounter diagnosis)  Comment:   Plan: oxyCODONE (ROXICODONE) 5 MG tablet            (G89.29) Chronic intractable pain  Comment:   Plan: oxyCODONE (ROXICODONE) 5 MG tablet                Plan on initial consult on 1/4/2025:  A multimodal plan was developed today to treat your pain.  Multimodal analgesia is a strategy that reduces reliance on opioids through the use of non-opioid analgesics and therapies that have different mechanisms of action.      Diagnostics:   Reviewed L) shoulder MRI.        Medications:  Start oxycodone 5mg TID for 25 days prior to surgery.  Stop all opioids 5 days prior to surgery.      The following OTC pain medications may be helpful, use as directed: Voltaren Gel 1%, CBD products, Arnica products, Capsaicin products, Australian Dream Cream, Epson It, Lidocaine Patch, Solanpas, Biofreeze, Aspercream, Tiger Balm and Erwin Emu cream.  Apply heat or cold PRN.      Therapies:  PT will be after shoulder surgery.      Interventions:  none    Follow up:     Return to clinic as needed.        Ellie Dixon APRN, RN, CNP, FNP  [facility]        BILLING TIME DOCUMENTATION:   The total TIME spent on this patient on the date of the encounter/appointment was 46 minutes.            Answers submitted by the patient for this  visit:  Patient Health Questionnaire (G7) (Submitted on 12/31/2024)  LISA 7 TOTAL SCORE: 8

## 2025-01-06 ENCOUNTER — OFFICE VISIT (OUTPATIENT)
Dept: PALLIATIVE MEDICINE | Facility: CLINIC | Age: 55
End: 2025-01-06
Attending: ORTHOPAEDIC SURGERY
Payer: COMMERCIAL

## 2025-01-06 VITALS — DIASTOLIC BLOOD PRESSURE: 89 MMHG | OXYGEN SATURATION: 92 % | SYSTOLIC BLOOD PRESSURE: 157 MMHG | HEART RATE: 67 BPM

## 2025-01-06 DIAGNOSIS — M25.512 CHRONIC LEFT SHOULDER PAIN: Primary | ICD-10-CM

## 2025-01-06 DIAGNOSIS — M25.512 CHRONIC LEFT SHOULDER PAIN: ICD-10-CM

## 2025-01-06 DIAGNOSIS — G89.29 CHRONIC INTRACTABLE PAIN: ICD-10-CM

## 2025-01-06 DIAGNOSIS — G89.29 CHRONIC LEFT SHOULDER PAIN: Primary | ICD-10-CM

## 2025-01-06 DIAGNOSIS — G89.29 CHRONIC LEFT SHOULDER PAIN: ICD-10-CM

## 2025-01-06 LAB
ABO + RH BLD: NORMAL
BLD GP AB SCN SERPL QL: NEGATIVE
SPECIMEN EXP DATE BLD: NORMAL

## 2025-01-06 PROCEDURE — 99204 OFFICE O/P NEW MOD 45 MIN: CPT | Performed by: NURSE PRACTITIONER

## 2025-01-06 RX ORDER — OXYCODONE HYDROCHLORIDE 5 MG/1
5 TABLET ORAL 3 TIMES DAILY PRN
Qty: 75 TABLET | Refills: 0 | Status: SHIPPED | OUTPATIENT
Start: 2025-01-06 | End: 2025-01-07

## 2025-01-06 ASSESSMENT — PATIENT HEALTH QUESTIONNAIRE - PHQ9: SUM OF ALL RESPONSES TO PHQ QUESTIONS 1-9: 8

## 2025-01-06 ASSESSMENT — PAIN SCALES - GENERAL: PAINLEVEL_OUTOF10: SEVERE PAIN (6)

## 2025-01-06 NOTE — TELEPHONE ENCOUNTER
"Plan per last visit 1/6/25:  \"Start oxycodone 5mg TID for 25 days prior to surgery. Stop all opioids 5 days prior to surgery. \"    Current oxycodone prescription states:  \"take 1 tablet (5 mg) by mouth 3 times daily as needed for severe pain (max of 2 tablets per day)\"    Will send to provider to clarify if patient may take a maximum of 2 or 3 tablets per day.  "

## 2025-01-06 NOTE — PATIENT INSTRUCTIONS
Plan on initial consult on 1/4/2025:  A multimodal plan was developed today to treat your pain.  Multimodal analgesia is a strategy that reduces reliance on opioids through the use of non-opioid analgesics and therapies that have different mechanisms of action.      Diagnostics:   Reviewed L) shoulder MRI.        Medications:  Start oxycodone 5mg TID for 25 days prior to surgery.  Stop all opioids 5 days prior to surgery.      The following OTC pain medications may be helpful, use as directed: Voltaren Gel 1%, CBD products, Arnica products, Capsaicin products, Australian Dream Cream, Epson It, Lidocaine Patch, Solanpas, Biofreeze, Aspercream, Tiger Balm and Erwin Emu cream.  Apply heat or cold PRN.      Therapies:  PT will be after shoulder surgery.      Interventions:  none    Follow up:     Return to clinic as needed.        NICKO Alvarez, RN, CNP, FNP  [facility]        ----------------------------------------------------------------  Clinic Number:  [phone]   Call with any questions about your care and for scheduling assistance.   Calls are returned Monday through Friday between 8 AM and 4:30 PM. We usually get back to you within 2 business days depending on the issue/request.    If we are prescribing your medications:  For opioid medication refills, call the clinic or send a [employer] message 7 days in advance.  Please include:  Name of requested medication  Name of the pharmacy.  For non-opioid medications, call your pharmacy directly to request a refill. Please allow 3-4 days to be processed.   Per [place] State Law:  All controlled substance prescriptions must be filled within 30 days of being written.    For those controlled substances allowing refills, pickup must occur within 30 days of last fill.      We believe regular attendance is key to your success in our program!    Any time you are unable to keep your appointment we ask that you call us at  least 24 hours in advance to cancel.This will allow us to offer the appointment time to another patient.   Multiple missed appointments may lead to dismissal from the clinic.

## 2025-01-06 NOTE — TELEPHONE ENCOUNTER
[facility] Call Center    Phone Message    May a detailed message be left on voicemail: yes     Reason for Call: Medication Question or concern regarding medication   Prescription Clarification  Name of Medication: oxyCODONE (ROXICODONE) 5 MG tablet     take 1 tablet (5 mg) by mouth 3 times daily as needed for severe pain (max of 2 tablets per day)     Prescribing Provider: Ellie Arias APRN CNP    Pharmacy:   [place] [id number] [place] [address]      What on the order needs clarification? Patient states her pharmacy told her that the script is unclear- she requests someone call pharmacy at [phone]. She does not know specifically what is unclear. Please review.    Action Taken: Message routed to:  Other: [facility]    Travel Screening: Not Applicable     Date of Service:

## 2025-01-07 ENCOUNTER — LAB (OUTPATIENT)
Dept: LAB | Facility: CLINIC | Age: 55
End: 2025-01-07
Payer: COMMERCIAL

## 2025-01-07 ENCOUNTER — PRE VISIT (OUTPATIENT)
Dept: SURGERY | Facility: CLINIC | Age: 55
End: 2025-01-07

## 2025-01-07 ENCOUNTER — OFFICE VISIT (OUTPATIENT)
Dept: SURGERY | Facility: CLINIC | Age: 55
End: 2025-01-07
Payer: COMMERCIAL

## 2025-01-07 ENCOUNTER — APPOINTMENT (OUTPATIENT)
Dept: LAB | Facility: CLINIC | Age: 55
End: 2025-01-07
Payer: COMMERCIAL

## 2025-01-07 ENCOUNTER — ANESTHESIA EVENT (OUTPATIENT)
Dept: SURGERY | Facility: CLINIC | Age: 55
End: 2025-01-07
Payer: COMMERCIAL

## 2025-01-07 VITALS
TEMPERATURE: 98.3 F | HEIGHT: 64 IN | DIASTOLIC BLOOD PRESSURE: 82 MMHG | RESPIRATION RATE: 16 BRPM | BODY MASS INDEX: 32.15 KG/M2 | SYSTOLIC BLOOD PRESSURE: 128 MMHG | HEART RATE: 68 BPM | WEIGHT: 188.3 LBS | OXYGEN SATURATION: 92 %

## 2025-01-07 DIAGNOSIS — Z01.818 PREOP EXAMINATION: ICD-10-CM

## 2025-01-07 DIAGNOSIS — M19.012 PRIMARY OSTEOARTHRITIS OF LEFT SHOULDER: ICD-10-CM

## 2025-01-07 DIAGNOSIS — Z01.818 PREOP EXAMINATION: Primary | ICD-10-CM

## 2025-01-07 DIAGNOSIS — Z87.39 HISTORY OF SERONEGATIVE INFLAMMATORY ARTHRITIS: ICD-10-CM

## 2025-01-07 LAB
ALBUMIN SERPL BCG-MCNC: 4.1 G/DL (ref 3.5–5.2)
ALT SERPL W P-5'-P-CCNC: 15 U/L (ref 0–50)
ANION GAP SERPL CALCULATED.3IONS-SCNC: 8 MMOL/L (ref 7–15)
AST SERPL W P-5'-P-CCNC: 21 U/L (ref 0–45)
BUN SERPL-MCNC: 21 MG/DL (ref 6–20)
CALCIUM SERPL-MCNC: 8.9 MG/DL (ref 8.8–10.4)
CHLORIDE SERPL-SCNC: 101 MMOL/L (ref 98–107)
CREAT SERPL-MCNC: 0.74 MG/DL (ref 0.51–0.95)
CRP SERPL-MCNC: 4.57 MG/L
EGFRCR SERPLBLD CKD-EPI 2021: >90 ML/MIN/1.73M2
ERYTHROCYTE [DISTWIDTH] IN BLOOD BY AUTOMATED COUNT: 14.5 % (ref 10–15)
GLUCOSE SERPL-MCNC: 95 MG/DL (ref 70–99)
HCO3 SERPL-SCNC: 29 MMOL/L (ref 22–29)
HCT VFR BLD AUTO: 40.8 % (ref 35–47)
HGB BLD-MCNC: 13.3 G/DL (ref 11.7–15.7)
MCH RBC QN AUTO: 30 PG (ref 26.5–33)
MCHC RBC AUTO-ENTMCNC: 32.6 G/DL (ref 31.5–36.5)
MCV RBC AUTO: 92 FL (ref 78–100)
PLATELET # BLD AUTO: 218 10E3/UL (ref 150–450)
POTASSIUM SERPL-SCNC: 4.8 MMOL/L (ref 3.4–5.3)
RBC # BLD AUTO: 4.43 10E6/UL (ref 3.8–5.2)
SODIUM SERPL-SCNC: 138 MMOL/L (ref 135–145)
WBC # BLD AUTO: 5.6 10E3/UL (ref 4–11)

## 2025-01-07 PROCEDURE — 99215 OFFICE O/P EST HI 40 MIN: CPT | Performed by: CLINICAL NURSE SPECIALIST

## 2025-01-07 PROCEDURE — 84450 TRANSFERASE (AST) (SGOT): CPT | Performed by: PATHOLOGY

## 2025-01-07 PROCEDURE — 36415 COLL VENOUS BLD VENIPUNCTURE: CPT | Performed by: PATHOLOGY

## 2025-01-07 PROCEDURE — 86140 C-REACTIVE PROTEIN: CPT | Performed by: PATHOLOGY

## 2025-01-07 PROCEDURE — 80048 BASIC METABOLIC PNL TOTAL CA: CPT | Performed by: PATHOLOGY

## 2025-01-07 PROCEDURE — 84460 ALANINE AMINO (ALT) (SGPT): CPT | Performed by: PATHOLOGY

## 2025-01-07 PROCEDURE — 82040 ASSAY OF SERUM ALBUMIN: CPT | Performed by: PATHOLOGY

## 2025-01-07 PROCEDURE — 85027 COMPLETE CBC AUTOMATED: CPT | Performed by: PATHOLOGY

## 2025-01-07 RX ORDER — FLUTICASONE FUROATE, UMECLIDINIUM BROMIDE AND VILANTEROL TRIFENATATE 100; 62.5; 25 UG/1; UG/1; UG/1
1 POWDER RESPIRATORY (INHALATION) DAILY
COMMUNITY

## 2025-01-07 RX ORDER — OXYCODONE HYDROCHLORIDE 5 MG/1
5 TABLET ORAL SEE ADMIN INSTRUCTIONS
Qty: 75 TABLET | Refills: 0 | Status: SHIPPED | OUTPATIENT
Start: 2025-01-07

## 2025-01-07 ASSESSMENT — PAIN SCALES - GENERAL: PAINLEVEL_OUTOF10: MODERATE PAIN (4)

## 2025-01-07 ASSESSMENT — LIFESTYLE VARIABLES: TOBACCO_USE: 1

## 2025-01-07 ASSESSMENT — ENCOUNTER SYMPTOMS
DYSRHYTHMIAS: 0
SEIZURES: 0

## 2025-01-07 NOTE — H&P
Pre-Operative H & P     CC:  Preoperative exam to assess for increased cardiopulmonary risk while undergoing surgery and anesthesia.    Date of Encounter: 1/7/2025  Primary Care Physician:  Neena Tam     Reason for visit:   Encounter Diagnoses   Name Primary?    Preop examination Yes    Primary osteoarthritis of left shoulder        HPI  Zayra Ramirez is a 54 year old female who presents for pre-operative H & P in preparation for  Procedure Information       Case: [id number] Date/Time: 02/04/25 0830    Procedure: ARTHROPLASTY, SHOULDER, TOTAL possible reverse (Left: Shoulder)    Anesthesia type: General with Block    Diagnosis: Primary osteoarthritis of left shoulder [M19.012]    Pre-op diagnosis: Primary osteoarthritis of left shoulder [M19.012]    [facility]: [facility]    Providers: Nataliia Dixon MD            History is obtained from the patient and chart review    Patient who was recently evaluated by Dr. Dixon for left shoulder pain due to glenohumeral osteoarthritis.  Her imaging was reviewed and surgical options discussed.  She has been counseled for above procedures.    Patient's history is otherwise complex with HTN, ROBERT w/ CPAP w/ supplemental O2, ILD, moderate persistent asthma, allergic rhinitis, former tobacco use, anxiety, depression, PTSD, RLS, neuropathy, borderline personality, morbid obesity, GERD, TMJ syndrome, seronegative rheumatoid arthritis, chronic immunosuppression, chronic pain, and pseudogout.          Hx of abnormal bleeding or anti-platelet use: Denies    Menstrual history: No LMP recorded (lmp unknown). Patient has had a hysterectomy.     Past Medical History  Past Medical History:   Diagnosis Date    Allergic rhinitis     Anemia     Arthritis     Asthma     copd    Cervical pseudoarthrosis, sequela     Dental abscess 08/2015    Depressive disorder     Diaphragmatic hernia 07/11/2016    Gastroesophageal reflux disease     History of emphysema (H)     Hoarseness      Hypertension     Obstructive sleep apnea     Other chronic pain     Primary osteoarthritis of left shoulder     Respiratory bronchiolitis interstitial lung disease (H)     Sleep apnea     Smoker 11/02/2015       Past Surgical History  Past Surgical History:   Procedure Laterality Date    CERVICAL FUSION      COLONOSCOPY      COLONOSCOPY N/A 02/06/2020    Procedure: COLONOSCOPY, WITH POLYPECTOMY AND BIOPSY;  Surgeon: Julian Mccullough MD;  Location:  GI    CYSTOSCOPY      ENT SURGERY      ESOPHAGOSCOPY, GASTROSCOPY, DUODENOSCOPY (EGD), COMBINED N/A 02/06/2020    Procedure: ESOPHAGOGASTRODUODENOSCOPY (EGD);  Surgeon: Julian Mccullough MD;  Location:  GI    EXCISE LESION INTRAORAL Bilateral 10/03/2018    Procedure: EXCISE LESION INTRAORAL;  Wide Local Excision Of of Left Oral Cavity Ulcer;  Surgeon: Morro Mijares MD;  [facility]    GYN SURGERY      HC DRAIN SKIN ABSCESS SIMPLE/SINGLE  03/16/2012    Procedure:INCISION AND DRAINAGE, ABSCESS, SIMPLE; Surgeon:CHRISTIANO HANCOCK; Location: GI    HEAD & NECK SURGERY      HYSTERECTOMY      HYSTERECTOMY      INCISION AND DRAINAGE ABDOMEN WASHOUT, COMBINED      INJECT EPIDURAL CERVICAL N/A 10/14/2021    Procedure: Cervical 7- Thoracic 1 epidural steroid injection with fluoroscopy;  Surgeon: Tram Florian MD;  Location: [facility] OR    INJECT EPIDURAL LUMBAR Right 09/15/2020    Procedure: Lumbar5- sacral 1 epidural steroid injection with fluoroscopy;  Surgeon: Tram Florian MD;  [facility]    INJECT EPIDURAL LUMBAR Right 06/29/2021    Procedure: Lumbar 5 sacral 1 epidural steroid injection with fluoroscopy;  Surgeon: Tram Florian MD;  Location: [facility] OR    INJECT SACROILIAC JOINT Bilateral 06/16/2020    Procedure: Bilateral sacroiliac joint steroid injection with fluoroscopy;  Surgeon: Tram Florian MD;  [facility]    LAMINECTOMY THORACIC ONE LEVEL N/A 08/19/2019    Procedure: LAMINECTOMY, SPINE, THORACIC, 11-12 and Part of Lumbar 1,  DRAINAGE OF EPIDURAL ABCESS, Epidural Drain Placement X 2;  Surgeon: Yadiel Beal MD;  [facility]: [facility]    OPTICAL TRACKING SYSTEM FUSION POSTERIOR CERVICAL THREE + LEVELS N/A 2/12/2024    Procedure: Posterior instrumented spinal fusion cervical 2-5; laminectomies cervical 3-4; use of local autograft and crushed cancellous allograft.;  Surgeon: Philip Wilhelm MD;  [facility]    OPTICAL TRACKING SYSTEM FUSION SPINE POSTERIOR LUMBAR THREE+ LEVELS N/A 11/30/2022    Procedure: Posterior Instrumented Spinal Fusion Thoracic 8 to Sacrum with Bilateral Pelvic Fixation; Transforaminal Lumbar Interbody Fusion with Erickson-Nixon Osteotomy Lumbar 1 to Sacral 1 (5 levels); Use of Infuse Bone Morphogenetic Protein Large Kit and Allograft;  Surgeon: Philip Wilhelm MD;  Location:  OR    ORTHOPEDIC SURGERY      WY PERCUT IMPLNT NEUROELECT,EPIDURAL N/A 08/08/2019    Procedure: TRIAL, SPINAL CORD STIMULATOR WITH BOSTON [place];  Surgeon: Sipple, Daniel Peter, DO;  Location: [facility];  Service: Pain    RADIO FREQUENCY ABLATION / DESTRUCTION OF SACROILOAC JOINT DORSAL PRIMARY RAMUS Bilateral 12/17/2019    Procedure: Bilateral lumbar radiofrequency ablation with fluoroscopy and intravenous sedation ( Lumbar 2,3,4,5 medial branch nerves for the bilateral lumbar3-4, 4-5 and 5-sacral1 joints.;  Surgeon: Tram Florian MD;  Location:  OR    RADIO FREQUENCY ABLATION / DESTRUCTION OF SACROILOAC JOINT DORSAL PRIMARY RAMUS Right 11/17/2020    Procedure: Right lumbar medial branch nerve radiofrequency ablation right L2,3,4,5 nerves supplying the right L3-4, L4-5 and L5-S1 facet joints;  Surgeon: Tram Florian MD;  Location: [facility] OR    spinal cord stimulator  08/08/2019    spinal cord stimulator removal  08/13/2019       Prior to Admission Medications  Current Outpatient Medications   Medication Sig Dispense Refill    acetaminophen (TYLENOL) 500 MG tablet Take 1-2 tablets (500-1,000 mg) by  mouth every 6 hours as needed for mild pain 100 tablet 1    albuterol (PROAIR HFA/PROVENTIL HFA/VENTOLIN HFA) 108 (90 Base) MCG/ACT inhaler Inhale 2 puffs into the lungs every 6 hours as needed for shortness of breath / dyspnea or wheezing Ventolin please 18 g 2    albuterol (PROVENTIL) (2.5 MG/3ML) 0.083% neb solution INHALE 3 ML VIA A NEBULIZER 4 TIMES DAILY IF NEEDED.      ARIPiprazole (ABILIFY) 5 MG tablet Take 5 mg by mouth every morning      azelastine (ASTELIN) 0.1 % nasal spray Spray 2 sprays in nostril 2 times daily      busPIRone HCl (BUSPAR) 30 MG tablet Take 30 mg by mouth 2 times daily       celecoxib (CELEBREX) 100 MG capsule TAKE 1 CAPSULE (100 MG) BY MOUTH 2 TIMES DAILY. AS NEEDED FOR JOINT PAIN 60 capsule 2    cloNIDine (CATAPRES) 0.1 MG tablet Take 1 tablet by mouth as needed      cyclobenzaprine (FLEXERIL) 10 MG tablet Take 1 tablet (10 mg) by mouth at bedtime. 90 tablet 1    DULoxetine (CYMBALTA) 60 MG capsule Take 120 mg by mouth At Bedtime       EPINEPHrine (ANY BX GENERIC EQUIV) 0.3 MG/0.3ML injection 2-pack Inject 0.3 mLs (0.3 mg) into the muscle as needed for anaphylaxis May repeat one time in 5-15 minutes if response to initial dose is inadequate. 2 each 1    hydroxychloroquine (PLAQUENIL) 200 MG tablet Take 1 tablet (200 mg) by mouth 2 times daily 180 tablet 3    montelukast (SINGULAIR) 10 MG tablet Take 1 tablet by mouth At Bedtime      omeprazole (PRILOSEC) 40 MG DR capsule Take 1 capsule (40 mg) by mouth daily. (Patient taking differently: Take 40 mg by mouth at bedtime.) 90 capsule 3    oxyCODONE (ROXICODONE) 5 MG tablet Take 1 tablet (5 mg) by mouth 3 times daily as needed for severe pain (max of 2 tablets per day). 75 tablet 0    OXYGEN-HELIUM IN 3L @ night    Oxygen only not helium      polyethylene glycol (MIRALAX) 17 GM/Dose powder Take 17 g by mouth daily (Patient taking differently: Take 17 g by mouth as needed.)      pregabalin (LYRICA) 150 MG capsule Take 1 capsule by mouth in  the morning and around 1 PM 60 capsule 5    pregabalin (LYRICA) 150 MG capsule Take 1 capsule by mouth in the morning and around 1 PM 60 capsule 3    rOPINIRole (REQUIP) 0.5 MG tablet Take 1 tablet (0.5 mg) by mouth at bedtime. 90 tablet 1    insulin pen needle (32G X 4 MM) 32G X 4 MM miscellaneous Use 1 NEEDLE WEEKLY 12 each 3    naloxone (NARCAN) 4 MG/0.1ML nasal spray Spray 1 spray (4 mg) into one nostril alternating nostrils as needed for opioid reversal every 2-3 minutes until assistance arrives 0.2 mL 0    Respiratory Therapy Supplies ([facility] CPAP FILTER) MISC       SENNA-docusate sodium (SENNA S) 8.6-50 MG tablet Take 1 tablet by mouth at bedtime (Patient not taking: Reported on 1/7/2025) 30 tablet 2    TRELEGY ELLIPTA 100-62.5-25 MCG/ACT oral inhaler Inhale 1 puff into the lungs daily. (Patient not taking: Reported on 1/7/2025)         Allergies  Allergies   Allergen Reactions    Bee Venom Anaphylaxis    Doxycycline Anaphylaxis     Patient thinks it may have been just nausea and vomiting, however unable to confirm  Other reaction(s): throat closes    Erythromycin      Other reaction(s): Vomiting      Hydrocodone-Acetaminophen Itching       Social History  Social History     Socioeconomic History    Marital status: Single     Spouse name: Not on file    Number of children: Not on file    Years of education: Not on file    Highest education level: Not on file   Occupational History    Not on file   Tobacco Use    Smoking status: Former     Current packs/day: 0.00     Average packs/day: 1.5 packs/day for 35.0 years (52.5 ttl pk-yrs)     Types: Cigarettes     Start date: 1/1/1996     Quit date: 11/14/2021     Years since quitting: 3.1    Smokeless tobacco: Former     Quit date: 11/14/2021   Vaping Use    Vaping status: Former   Substance and Sexual Activity    Alcohol use: Never    Drug use: Yes     Types: Marijuana     Comment: smoke 2x times a week    Sexual activity: Not Currently     Partners:  Male     Birth control/protection: Abstinence   Other Topics Concern    Parent/sibling w/ CABG, MI or angioplasty before 65F 55M? No   Social History Narrative    Not on file     Social Drivers of Health     Financial Resource Strain: Low Risk  (5/2/2024)    Financial Resource Strain     Within the past 12 months, have you or your family members you live with been unable to get utilities (heat, electricity) when it was really needed?: No   Food Insecurity: Low Risk  (5/2/2024)    Food Insecurity     Within the past 12 months, did you worry that your food would run out before you got money to buy more?: No     Within the past 12 months, did the food you bought just not last and you didn t have money to get more?: No   Transportation Needs: Low Risk  (5/2/2024)    Transportation Needs     Within the past 12 months, has lack of transportation kept you from medical appointments, getting your medicines, non-medical meetings or appointments, work, or from getting things that you need?: No   Physical Activity: Insufficiently Active (5/2/2024)    Exercise Vital Sign     Days of Exercise per Week: 3 days     Minutes of Exercise per Session: 30 min   Stress: No Stress Concern Present (5/2/2024)    [employer] - Occupational Stress Questionnaire     Feeling of Stress : Not at all   Social Connections: Unknown (5/2/2024)    Social Connection and Isolation Panel [NHANES]     Frequency of Communication with Friends and Family: Not on file     Frequency of Social Gatherings with Friends and Family: Once a week     Attends Restorationist Services: Not on file     Active Member of Clubs or Organizations: Not on file     Attends Club or Organization Meetings: Not on file     Marital Status: Not on file   Interpersonal Safety: Low Risk  (5/2/2024)    Interpersonal Safety     Do you feel physically and emotionally safe where you currently live?: Yes     Within the past 12 months, have you been hit, slapped,  kicked or otherwise physically hurt by someone?: No     Within the past 12 months, have you been humiliated or emotionally abused in other ways by your partner or ex-partner?: No   Housing Stability: Low Risk  (5/2/2024)    Housing Stability     Do you have housing? : Yes     Are you worried about losing your housing?: No       Family History  Family History   Problem Relation Age of Onset    Asthma Mother     Restless Leg Syndrome Mother     Other Cancer Father         base of tongue cancer at age ~65    Hypertension Father     Back Pain Father     Restless Leg Syndrome Father     Coronary Artery Disease Father     Diabetes Father     Depression Father     Anxiety Disorder Father     Osteoporosis Father     Restless Leg Syndrome Sister     Breast Cancer Maternal Aunt 55    Anesthesia Reaction No family hx of     Deep Vein Thrombosis (DVT) No family hx of     Thyroid Cancer No family hx of     Multiple endocrine neoplasia No family hx of        Review of Systems  The complete review of systems is negative other than noted in the HPI or here.   Anesthesia Evaluation   Pt has had prior anesthetic. Type: General and MAC.    History of anesthetic complications   Slow to wake, ROBERT.    ROS/MED HX  ENT/Pulmonary: Comment: Uses 3L O2 with CPAP at night    Uses albuterol daily (currently 1-3 daily). Frequency changes w/ seasons.    Followed by Jennifer [facility]illa [facility].       TMJ-Flexeril for grinding    (+) sleep apnea, uses CPAP,              tobacco use, Past use,    Moderate Persistent, asthma  Treatment: Inhaler daily, Inhaled steroids and Nebulizer prn,              (-) recent URI   Neurologic: Comment: RLS    (+)    peripheral neuropathy, - LEs, radiculopathy.                        (-) no seizures and no CVA   Cardiovascular:     (+)  hypertension- -   -  - -                                 Previous cardiac testing   Echo: Date: Results:    Stress Test:  Date: Results:    ECG Reviewed:  Date: 2021  "Results:  SR  Cath:  Date: Results:   (-) taking anticoagulants/antiplatelets, BLANK and arrhythmias   METS/Exercise Tolerance: 4 - Raking leaves, gardening Comment: Able to walk 1/3 to 1/2 mile without exertional symptoms.   Continues physical therapy   Hematologic:  - neg hematologic  ROS  (-) history of blood clots and history of blood transfusion   Musculoskeletal: Comment:   S/p ACDF with plate fixation C3-C5 in 2017    S/p Laminectomies T11-L1 in 2019    Right shoulder pseudogout    Rheumatoid arthritis, followed by Dr. Saenz.    (+)  arthritis,             GI/Hepatic:     (+) GERD, Asymptomatic on medication,               (-) liver disease   Renal/Genitourinary:  - neg Renal ROS  (-) renal disease   Endo:     (+)               Obesity,    (-) Type II DM   Psychiatric/Substance Use: Comment: Borderline personality    PTSD      (+) psychiatric history anxiety, depression and bipolar       Infectious Disease:  - neg infectious disease ROS     Malignancy:  - neg malignancy ROS     Other:  - neg other ROS    (+)  , H/O Chronic Pain,         /82 (BP Location: Right arm, Patient Position: Sitting, Cuff Size: Adult Large)   Pulse 68   Temp 98.3  F (36.8  C) (Oral)   Resp 16   Ht 1.626 m (5' 4\")   Wt 85.4 kg (188 lb 4.8 oz)   LMP  (LMP Unknown)   SpO2 92%   Breastfeeding No   BMI 32.32 kg/m      Physical Exam   Constitutional: Awake, alert, cooperative, no apparent distress, and appears stated age. Accompanied by family member.   Eyes: Pupils equal, round and reactive to light, extra ocular muscles intact, sclera clear, conjunctiva normal.  Glasses on  HENT: Normocephalic, oral pharynx with moist mucus membranes, good dentition. No goiter appreciated.   Respiratory: Clear to auscultation bilaterally, no crackles or wheezing.  Diminished breath sounds.  Occasional cough.  No obvious dyspnea  Cardiovascular: Regular rate and rhythm, normal S1 and S2, and no murmur noted.  Carotids +2, no bruits. No " edema. Palpable pulses to radial  DP and PT arteries.   GI: Normal bowel sounds, soft, non-distended, non-tender, no masses palpated, no hepatosplenomegaly.   Lymph/Hematologic: No cervical lymphadenopathy and no supraclavicular lymphadenopathy.  Genitourinary: Deferred  Skin: Warm and dry.    Musculoskeletal: Limited ROM of neck. There is no redness, warmth, or swelling of the joints. Gross motor strength is normal.    Neurologic: Awake, alert, oriented to name, place and time. Cranial nerves II-XII are grossly intact. Gait is cautious  Neuropsychiatric: Calm, cooperative. Normal affect.     Prior Labs/Diagnostic Studies   All labs and imaging personally reviewed     EK Sinus rhythm    MR Left shoulder 24                                                                  Impression:  Study compromised by motion.  1. Low grade articular sided tear of the supraspinatus middle fibers  at the footprint.  2. Severe glenohumeral joint osteoarthritis.   *  Large glenohumeral joint effusion.  *  Posteroinferior labral tear.  3. Mild acromioclavicular joint degenerative change with widening of  AC joint space.  4. Query severe tendinosis at the bicipital labral anchor region.   5. Relatively prominent for age red marrow reconversion, nonspecific  but can be seen in the setting of high endurance athlete, severe  chronic anemia, cigarette smoking, obesity, chronic respiratory  disease and other etiologies.    Complete PFTs OSH 24    IMPRESSION:   Spirometry with severe obstructive ventilatory defect and   suggestion of restrictive ventilatory defect   Reversibility was not assessed.   Flow volume loop with expiratory flow limitation   Lung volumes with air trapping   Diffusion is normal      The patient's records and results personally reviewed by this provider.     Outside records reviewed from: Care Everywhere    LAB/DIAGNOSTIC STUDIES TODAY:    Lab Results   Component Value Date    WBC 5.6 2025    WBC  7.1 07/10/2021     Lab Results   Component Value Date    RBC 4.43 01/07/2025    RBC 3.66 07/10/2021     Lab Results   Component Value Date    HGB 13.3 01/07/2025    HGB 11.9 07/10/2021     Lab Results   Component Value Date    HCT 40.8 01/07/2025    HCT 37.8 07/10/2021     Lab Results   Component Value Date    MCV 92 01/07/2025     07/10/2021     Lab Results   Component Value Date    MCH 30.0 01/07/2025    MCH 32.5 07/10/2021     Lab Results   Component Value Date    MCHC 32.6 01/07/2025    MCHC 31.5 07/10/2021     Lab Results   Component Value Date    RDW 14.5 01/07/2025    RDW 15.3 07/10/2021     Lab Results   Component Value Date     01/07/2025     07/10/2021     Last Comprehensive Metabolic Panel:  Lab Results   Component Value Date     01/07/2025    POTASSIUM 4.8 01/07/2025    CHLORIDE 101 01/07/2025    CO2 29 01/07/2025    ANIONGAP 8 01/07/2025    GLC 95 01/07/2025    BUN 21.0 (H) 01/07/2025    CR 0.74 01/07/2025    GFRESTIMATED >90 01/07/2025    NATALIYA 8.9 01/07/2025     CRP inflammation 4.57  Albumin 4.1  ALT 15  AST 21    Type and screen drawn    Assessment    Zayra Ramirez is a 54 year old female seen as a PAC referral for risk assessment and optimization for anesthesia.    Plan/Recommendations  Pt will be optimized for the proposed procedure.  See below for details on the assessment, risk, and preoperative recommendations    Patient reports being slow to wake. Multiple anesthesia records available    NEUROLOGY  No history of TIA, CVA or seizure  - Chronic Pain of neck and arms. Will hold Celebrex for 3 days prior to surgery. Duloxetine at HS.   - RLS. Requip at HS. Will take Lyrica on DOS     -Post Op delirium risk factors:  High co-morbid index    ENT  - No current airway concerns.  Will need to be reassessed day of surgery.  Mallampati: I  TM: > 3   S/P C3-5 fusion with plate, limited extension ROM of neck  Upper/lower partials  TMJ with mild joint soreness. No limitation to  "mouth opening. No history of locking.   Flexeril for teeth grinding at bedtime    CARDIAC  No medication required for blood pressure at this time.  BP today 128/82.  No other cardiac history, symptoms or meds. No coronary calcification on CT imaging on 4/16/24.  Able to walk 1/3 -1/2 mile since last surgery without exertional symptoms.  Continues physical therapy.    - METS (Metabolic Equivalents)~4    RCRI: 0.4% risk of serious cardiac events      PULMONARY  - ROBERT with CPAP. Uses 3L O2 with CPAP at night  - Asthma, chronic, obstructive, with significant air trapping per Pulm notes.  Updated PFTs above.  Last seen by Pulmonary provider on 11/11/24.  Trelegy Ellipta was initiated at that time, however patient has been having some difficulty managing thrush like symptoms.  She plans to reach out to provider soon to discuss; and this was encouraged today.  As needed use of albuterol, on average 1-3 times daily.  Will bring on day of surgery.  As needed use of albuterol nebs.  No admissions in last year.  Occasional cough.  Stable symptoms.  Environmental allergies  - Chronic rhinitis  Will use azelastine nasal spray on DOS.   Singulair at HS       - Tobacco History    History   Smoking Status    Former    Types: Cigarettes   Smokeless Tobacco    Former    Quit date: 11/14/2021       GI: GERD, controlled with omeprazole at HS   PONV High Risk  Total Score: 3           1 AN PONV: Pt is Female    1 AN PONV: Patient is not a current smoker    1 AN PONV: Intended Post Op Opioids        /RENAL  - Baseline Creatinine  0.74    ENDOCRINE    - BMI: Estimated body mass index is 32.32 kg/m  as calculated from the following:    Height as of this encounter: 1.626 m (5' 4\").    Weight as of this encounter: 85.4 kg (188 lb 4.8 oz).  Obesity (BMI >30)  - No history of Diabetes Mellitus    HEME  VTE Low Risk 0.26%             Total Score: 0      Denies personal or family history of blood clots  Denies history of blood transfusion " "  Type and screen drawn today     MSK: Glenohumeral osteoarthritis of left shoulder. Limited ROM    - S/p ACDF with plate fixation C3-C5 in 2017     - S/p Laminectomies T11-L1 in 2019     - Right shoulder pseudogout     - Rheumatoid arthritis, followed by Dr. Saenz. Will take plaquenil on DOS    PSYCH   Anxiety, depression, bipolar     - Borderline personality     - PTSD     Will take Abilify and Buspar on DOS. Clonidine prn if feels \"trapped\". Denies issues surrounding anesthesia    Pain management. Discussed possibility of nerve block if appropriate for patient's procedure. Final counseling and decisions by regional team on DOS.    Established care with pain clinic 1/6/25. From notes-    \"Medications:  Start oxycodone 5mg TID for 25 days prior to surgery.  Stop all opioids 5 days prior to surgery.\" Per patient this prescription has not been obtained yet.      Was also given recommendations for multiple OTC cares prn    Different anesthesia methods/types have been discussed with the patient, but they are aware that the final plan will be decided by the assigned anesthesia provider on the date of service.      The patient is optimized for their procedure. AVS with information on surgery time/arrival time, meds and NPO status given by nursing staff. No further diagnostic testing indicated.      On the day of service:     Prep time: 15 minutes  Visit time: 13 minutes  Documentation time: 15 minutes  ------------------------------------------  Total time: 43 minutes      NICKO Edgar CNS  Preoperative Assessment Center  [facility]  Clinic and Surgery Center  Phone: [phone]  Fax: [phone]    "

## 2025-01-07 NOTE — PROVIDER NOTIFICATION
01/07/25 1310   Discharge Planning   Patient/Family Anticipates Transition to home with family   Concerns to be Addressed no discharge needs identified   Living Arrangements   People in Home significant other   Type of Residence Private Residence   Is your private residence a single family home or apartment? Single family home   Number of Stairs, Within Home, Primary two   Stair Railings, Within Home, Primary railings safe and in good condition   Once home, are you able to live on one level? Yes   Which level? Main Level   Bathroom Shower/Tub Walk-in shower   Equipment Currently Used at Home none   Support System   Support Systems Spouse/Significant Other;Family Members   Do you have someone available to stay with you one or two nights once you are home? Yes   Medical Clearance   Date of Physical 01/07/25   Clinic Name [facility]   It is recommended that you call and check with any specialty providers before surgery to see if you need surgical clearance.  Do you see any specialty providers outside of your primary care provider? Yes  (pulmonologist)   Blood   Known Bleeding Disorder or Coagulopathy No   Does the patient have any Methodist/cultural preferences related to blood products? No   Education   Has the patient scheduled or completed pre-op total joint education, either in class or online, in the last 12 months? No

## 2025-01-07 NOTE — TELEPHONE ENCOUNTER
Pt said that she is returning a call in regards to this message.  Reached out via Teams and no response.  Please try calling Pt back.

## 2025-01-07 NOTE — PATIENT INSTRUCTIONS
Preparing for Your Surgery      Name:  Zayra Ramirez   MRN:  [id number]   :  1970   Today's Date:  2025       Arriving for surgery:  Surgery date:  25  Arrival time:  6:00 am  Surgery time: 8:30 am    Please come to:     Please come to:       [facility] [address]   [address] [place]  [address]     The Green Ramp for patients and visitors is beneath the [facility]. The parking facility entrance is at the intersection of [address] [place] [address] and [address]. Patients and visitors who self-park will receive the reduced hospital parking rate (no ticket validation needed).     Gravy parking, located at the [facility] main entrance on [address] [place] [address], is available Monday - Friday from 7 am to 3:30 pm.     Discounted parking pass options can be purchased from  attendants during business hours.     -Check in at the security desk in the [facility] ([facility])   Lobby. They will direct you to the correct elevators.   -Proceed to the 3rd floor, Adult Surgery Waiting Lounge. [phone]     If you need directions, a wheelchair or escort please stop at the Information Desk in the lobby.  Inform the information person you are here for surgery; a wheelchair and escort to [address] will be provided.   An escort to the Adult Surgery Waiting Lounge will be provided. .    What can I eat or drink?  -  You may eat and drink normally up to 8 hours prior to arrival time. (Until 10:00 pm on 2/3/25)  -  You may have clear liquids until 2 hours prior to arrival time. (Until 4:00 am on 25)    Examples of clear liquids:  Water  Clear broth  Juices (apple, white grape, white cranberry  and cider) without pulp  Noncarbonated, powder based beverages  (lemonade and Cristobal-Aid)  Sodas (Sprite, 7-Up, ginger ale and seltzer)  Coffee or tea (without milk or cream)  Gatorade    -   No Alcohol or cannabis products for at least 24 hours before surgery.     Which medicines can I take?    Hold Aspirin for 7 days before surgery.   Hold Multivitamins for 7 days before surgery.  Hold Supplements for 7 days before surgery.  Hold Ibuprofen (Advil, Motrin) for 3 day(s) before surgery--unless otherwise directed by surgeon.  Hold Naproxen (Aleve) for 4 days before surgery.  Hold Celecoxib (Celebrex) for 3 days before surgery.    -  DO NOT take these medications the day of surgery:  Polyethylene glycol (Miralax)    -  PLEASE TAKE these medications the day of surgery or per your usual routine:  Tylenol if needed  Albuterol inhaler if needed and bring this to the hospital  Albuterol inhaler if needed  Aripiprazole (Abilify)  Axelastine (Astelin) nasal spray and you may bring this to the hospital  Buspirone (Buspar)  Clonidine (Catapres) if needed  Cyclobenzaprine (Flexeril) if needed  Duloxetine (Cymbalta)  Hydroxychloroquine (Plaquenil)  Montelukast (Singulair)  Narcan if needed  Omeprazole (Prilosec)  Oxycodone if needed or as instructed by other provider  Pregabalin (Lyrica)  Ropinirole (Requip)    How do I prepare myself?  - Please take 2 showers (one the night prior to surgery and one the morning of surgery) using Scrubcare or Hibiclens soap.    Use this soap only from the neck to your toes. Avoid genital area      Leave the soap on your skin for one minute--then rinse thoroughly.      You may use your own shampoo and conditioner. No other hair products.   - Please remove all jewelry and body piercings.  - No lotions, deodorants or fragrance.  - No makeup or fingernail polish.   - Bring your ID and insurance card.    -If you use a CPAP machine, please bring the CPAP machine, tubing, and mask to hospital.    -If you have a Deep Brain Stimulator, Spinal Cord Stimulator, or any Neuro Stimulator device---you must bring the remote control to the hospital.      ALL PATIENTS GOING HOME THE SAME DAY OF SURGERY  ARE REQUIRED TO HAVE A RESPONSIBLE ADULT TO DRIVE AND BE IN ATTENDANCE WITH THEM FOR 24 HOURS FOLLOWING SURGERY.    Covid testing policy as of 12/06/2022  Your surgeon will notify and schedule you for a COVID test if one is needed before surgery--please direct any questions or COVID symptoms to your surgeon      Questions or Concerns:    - For any questions regarding the day of surgery or your hospital stay, please contact the Pre Admission Nursing Office at [phone].       - If you have health changes between today and your surgery, please call your surgeon.       - For questions after surgery, please call your surgeons office.           Current Visitor Guidelines    You may have 2 visitors in the pre op area.    Visiting hours: 8 a.m. to 8:30 p.m.    Patients confirmed or suspected to have symptoms of COVID 19 or flu:     No visitors allowed for adult patients.   Children (under age 18) can have 1 named visitor.     People who are sick or showing symptoms of COVID 19 or flu:    Are not allowed to visit patients--we can only make exceptions in special situations.       Please follow these guidelines for your visit:          Please maintain social distance          Masking is optional--however at times you may be asked to wear a mask for the safety of yourself and others     Clean your hands with alcohol hand . Do this when you arrive at and leave the building and patient room,    And again after you touch your mask or anything in the room.     Go directly to and from the room you are visiting.     Stay in the patient s room during your visit. Limit going to other places in the hospital as much as possible     Leave bags and jackets at home or in the car.     For everyone s health, please don t come and go during your visit. That includes for smoking   during your visit.

## 2025-01-07 NOTE — TELEPHONE ENCOUNTER
[facility] Call Center    Phone Message    May a detailed message be left on voicemail: no     Reason for Call: Other: Pt calling back into clinic to check on status of medication clarification needed for pharmacy. Please contact pt when able.      Action Taken: Other: BU PAIN    Travel Screening: Not Applicable     Date of Service:

## 2025-01-24 NOTE — PROGRESS NOTES
CHIEF COMPLAINT: Status post left total shoulder arthroplasty    DATE OF SURGERY: 2/4/2025    HISTORY OF PRESENT ILLNESS: Zayra is an extremely pleasant 54 year-old right hand dominant female who is 2 weeks status post the above procedure.  Reports minimal pain, only taking Tylenol at this point.  Started physical therapy.  Has been compliant with the sling.  SANE R - 100 L - 0    EXAM:  Pleasant adult 54 year old in no distress.  Respirations even and unlabored.  Left upper extremity: Incision clean, dry, and intact. No erythema. No drainage. Sens intact Ax/Musc/Med/Rad/Uln nerves. Motor intact EPL, FPL, and Intrinsics. Shoulder range of motion not tested due to postop restrictions.    DATA REVIEW:  AP and scapular Y xrays of the left shoulder were ordered and reviewed today showing total shoulder arthroplasty with no acute postoperative complication    ASSESSMENT:  1. 2 weeks status post above procedure    PLAN:  I reviewed the intraop findings.   We discussed the plan for rehabilitation.  I discussed need to keep the sling on at all times except for bathing and dressing or home exercises.  Patient was encouraged to perform active range of motion exercises about the hand wrist and elbow.  We discussed a step down regimen for pain medication to over the counter medications. The patient has started physical therapy.  I will see the patient back in 4 weeks with repeat x-rays.      Nataliia Dixon  Orthopedic Surgery Sports Medicine and Shoulder Surgery

## 2025-01-27 DIAGNOSIS — M19.012 PRIMARY OSTEOARTHRITIS OF LEFT SHOULDER: Primary | ICD-10-CM

## 2025-01-28 DIAGNOSIS — Z87.39 HISTORY OF SERONEGATIVE INFLAMMATORY ARTHRITIS: ICD-10-CM

## 2025-01-31 NOTE — PATIENT INSTRUCTIONS
"Diagnosis:  1.  Inflammatory arthritis: Overall control of inflammatory arthritis is good, with minimal morning stiffness, joint swelling, or disruption in activities of daily living.  I recommend continuing on methotrexate.  2.  Left shoulder pain and reduced motion: There have been slow but steady gains in range of motion and control of left shoulder discomfort with physical therapy and chiropractic.    Plan:  1.  Continue methotrexate 9 tablets (22.5 mg) once weekly.While on methotrexate:   -- Check labs every 3 months (AST/ALT, Albumin, CBC with platelets)   -- Limit alcohol intake to 2 drinks weekly; use folate 1 mg daily.  --Tylenol 500-1000 mg can be used as needed up to three times daily for nausea/headache associated with dosing.  2.  Continue daily \"cane raising\" and while walking exercises to maintain and expand left shoulder range of motion.  3.  Repeat monitoring blood work now and again prior to seeing Keyon Branham in follow-up  " actual

## 2025-02-03 NOTE — TELEPHONE ENCOUNTER
Patient called.  She states she had an eye exam at [facility] in September.  She will have the results faxed.  She is aware refill will beheld until the result is faxed.     Malu Maldonado RN

## 2025-02-04 ENCOUNTER — ANESTHESIA (OUTPATIENT)
Dept: SURGERY | Facility: CLINIC | Age: 55
End: 2025-02-04
Payer: COMMERCIAL

## 2025-02-04 ENCOUNTER — HOSPITAL ENCOUNTER (OUTPATIENT)
Facility: CLINIC | Age: 55
Discharge: HOME | End: 2025-02-05
Attending: ORTHOPAEDIC SURGERY | Admitting: ORTHOPAEDIC SURGERY
Payer: COMMERCIAL

## 2025-02-04 ENCOUNTER — APPOINTMENT (OUTPATIENT)
Dept: GENERAL RADIOLOGY | Facility: CLINIC | Age: 55
End: 2025-02-04
Attending: ORTHOPAEDIC SURGERY
Payer: COMMERCIAL

## 2025-02-04 DIAGNOSIS — Z96.612 S/P SHOULDER REPLACEMENT, LEFT: Primary | ICD-10-CM

## 2025-02-04 DIAGNOSIS — K21.9 GASTROESOPHAGEAL REFLUX DISEASE, UNSPECIFIED WHETHER ESOPHAGITIS PRESENT: ICD-10-CM

## 2025-02-04 PROBLEM — M19.012 DEGENERATIVE ARTHRITIS OF LEFT SHOULDER REGION: Status: ACTIVE | Noted: 2025-02-04

## 2025-02-04 LAB — GLUCOSE BLDC GLUCOMTR-MCNC: 111 MG/DL (ref 70–99)

## 2025-02-04 PROCEDURE — 250N000025 HC SEVOFLURANE, PER MIN: Performed by: ORTHOPAEDIC SURGERY

## 2025-02-04 PROCEDURE — 250N000013 HC RX MED GY IP 250 OP 250 PS 637: Performed by: NURSE ANESTHETIST, CERTIFIED REGISTERED

## 2025-02-04 PROCEDURE — 64415 NJX AA&/STRD BRCH PLXS IMG: CPT | Mod: XU,LT

## 2025-02-04 PROCEDURE — 250N000011 HC RX IP 250 OP 636

## 2025-02-04 PROCEDURE — C1776 JOINT DEVICE (IMPLANTABLE): HCPCS | Performed by: ORTHOPAEDIC SURGERY

## 2025-02-04 PROCEDURE — 710N000010 HC RECOVERY PHASE 1, LEVEL 2, PER MIN: Performed by: ORTHOPAEDIC SURGERY

## 2025-02-04 PROCEDURE — 999N000141 HC STATISTIC PRE-PROCEDURE NURSING ASSESSMENT: Performed by: ORTHOPAEDIC SURGERY

## 2025-02-04 PROCEDURE — 250N000013 HC RX MED GY IP 250 OP 250 PS 637

## 2025-02-04 PROCEDURE — 250N000009 HC RX 250: Performed by: NURSE ANESTHETIST, CERTIFIED REGISTERED

## 2025-02-04 PROCEDURE — 250N000009 HC RX 250: Performed by: STUDENT IN AN ORGANIZED HEALTH CARE EDUCATION/TRAINING PROGRAM

## 2025-02-04 PROCEDURE — 250N000011 HC RX IP 250 OP 636: Performed by: ORTHOPAEDIC SURGERY

## 2025-02-04 PROCEDURE — 250N000011 HC RX IP 250 OP 636: Performed by: NURSE ANESTHETIST, CERTIFIED REGISTERED

## 2025-02-04 PROCEDURE — 250N000013 HC RX MED GY IP 250 OP 250 PS 637: Performed by: ORTHOPAEDIC SURGERY

## 2025-02-04 PROCEDURE — 250N000013 HC RX MED GY IP 250 OP 250 PS 637: Performed by: PHYSICIAN ASSISTANT

## 2025-02-04 PROCEDURE — P9045 ALBUMIN (HUMAN), 5%, 250 ML: HCPCS | Performed by: NURSE ANESTHETIST, CERTIFIED REGISTERED

## 2025-02-04 PROCEDURE — 999N000065 XR SHOULDER LEFT PORT G/E 2 VIEWS: Mod: LT

## 2025-02-04 PROCEDURE — 258N000001 HC RX 258: Performed by: ORTHOPAEDIC SURGERY

## 2025-02-04 PROCEDURE — 271N000001 HC OR GENERAL SUPPLY NON-STERILE: Performed by: ORTHOPAEDIC SURGERY

## 2025-02-04 PROCEDURE — 99214 OFFICE O/P EST MOD 30 MIN: CPT | Performed by: PHYSICIAN ASSISTANT

## 2025-02-04 PROCEDURE — 272N000001 HC OR GENERAL SUPPLY STERILE: Performed by: ORTHOPAEDIC SURGERY

## 2025-02-04 PROCEDURE — 258N000003 HC RX IP 258 OP 636

## 2025-02-04 PROCEDURE — 250N000009 HC RX 250: Performed by: ORTHOPAEDIC SURGERY

## 2025-02-04 PROCEDURE — 360N000077 HC SURGERY LEVEL 4, PER MIN: Performed by: ORTHOPAEDIC SURGERY

## 2025-02-04 PROCEDURE — 258N000003 HC RX IP 258 OP 636: Performed by: NURSE ANESTHETIST, CERTIFIED REGISTERED

## 2025-02-04 PROCEDURE — 250N000011 HC RX IP 250 OP 636: Performed by: STUDENT IN AN ORGANIZED HEALTH CARE EDUCATION/TRAINING PROGRAM

## 2025-02-04 PROCEDURE — C1713 ANCHOR/SCREW BN/BN,TIS/BN: HCPCS | Performed by: ORTHOPAEDIC SURGERY

## 2025-02-04 PROCEDURE — 370N000017 HC ANESTHESIA TECHNICAL FEE, PER MIN: Performed by: ORTHOPAEDIC SURGERY

## 2025-02-04 PROCEDURE — 23472 RECONSTRUCT SHOULDER JOINT: CPT | Mod: GC | Performed by: ORTHOPAEDIC SURGERY

## 2025-02-04 PROCEDURE — 250N000011 HC RX IP 250 OP 636: Mod: JZ

## 2025-02-04 DEVICE — IMP BONE CEMENT STRK SIMPLEX TOBRAMYCIN 40CC 6197-9-001: Type: IMPLANTABLE DEVICE | Site: SHOULDER | Status: FUNCTIONAL

## 2025-02-04 DEVICE — CORTILOC® PEGGED GLENOID
Type: IMPLANTABLE DEVICE | Site: SHOULDER | Status: FUNCTIONAL
Brand: TORNIER PERFORM® ANATOMIC GLENOID

## 2025-02-04 DEVICE — IMPLANTABLE DEVICE
Type: IMPLANTABLE DEVICE | Site: SHOULDER | Status: FUNCTIONAL
Brand: TORNIER SIMPLICITI® SHOULDER SYSTEM

## 2025-02-04 RX ORDER — DEXAMETHASONE SODIUM PHOSPHATE 4 MG/ML
INJECTION, SOLUTION INTRA-ARTICULAR; INTRALESIONAL; INTRAMUSCULAR; INTRAVENOUS; SOFT TISSUE PRN
Status: DISCONTINUED | OUTPATIENT
Start: 2025-02-04 | End: 2025-02-04

## 2025-02-04 RX ORDER — OXYCODONE HYDROCHLORIDE 5 MG/1
5-10 TABLET ORAL EVERY 4 HOURS PRN
Qty: 10 TABLET | Refills: 0 | Status: SHIPPED | OUTPATIENT
Start: 2025-02-04 | End: 2025-02-10

## 2025-02-04 RX ORDER — SODIUM CHLORIDE, SODIUM LACTATE, POTASSIUM CHLORIDE, CALCIUM CHLORIDE 600; 310; 30; 20 MG/100ML; MG/100ML; MG/100ML; MG/100ML
INJECTION, SOLUTION INTRAVENOUS CONTINUOUS
Status: DISCONTINUED | OUTPATIENT
Start: 2025-02-04 | End: 2025-02-05 | Stop reason: HOSPADM

## 2025-02-04 RX ORDER — MONTELUKAST SODIUM 10 MG/1
10 TABLET ORAL AT BEDTIME
Status: DISCONTINUED | OUTPATIENT
Start: 2025-02-04 | End: 2025-02-04

## 2025-02-04 RX ORDER — LIDOCAINE HYDROCHLORIDE 20 MG/ML
INJECTION, SOLUTION INFILTRATION; PERINEURAL PRN
Status: DISCONTINUED | OUTPATIENT
Start: 2025-02-04 | End: 2025-02-04

## 2025-02-04 RX ORDER — AMOXICILLIN 250 MG
1 CAPSULE ORAL 2 TIMES DAILY
Status: DISCONTINUED | OUTPATIENT
Start: 2025-02-04 | End: 2025-02-05 | Stop reason: HOSPADM

## 2025-02-04 RX ORDER — METHOCARBAMOL 750 MG/1
750 TABLET, FILM COATED ORAL EVERY 6 HOURS PRN
Status: DISCONTINUED | OUTPATIENT
Start: 2025-02-04 | End: 2025-02-04

## 2025-02-04 RX ORDER — ONDANSETRON 2 MG/ML
4 INJECTION INTRAMUSCULAR; INTRAVENOUS EVERY 6 HOURS PRN
Status: DISCONTINUED | OUTPATIENT
Start: 2025-02-04 | End: 2025-02-05 | Stop reason: HOSPADM

## 2025-02-04 RX ORDER — DEXAMETHASONE SODIUM PHOSPHATE 4 MG/ML
4 INJECTION, SOLUTION INTRA-ARTICULAR; INTRALESIONAL; INTRAMUSCULAR; INTRAVENOUS; SOFT TISSUE
Status: DISCONTINUED | OUTPATIENT
Start: 2025-02-04 | End: 2025-02-04 | Stop reason: HOSPADM

## 2025-02-04 RX ORDER — ONDANSETRON 2 MG/ML
4 INJECTION INTRAMUSCULAR; INTRAVENOUS EVERY 30 MIN PRN
Status: DISCONTINUED | OUTPATIENT
Start: 2025-02-04 | End: 2025-02-04 | Stop reason: HOSPADM

## 2025-02-04 RX ORDER — PROCHLORPERAZINE MALEATE 5 MG/1
10 TABLET ORAL EVERY 6 HOURS PRN
Status: DISCONTINUED | OUTPATIENT
Start: 2025-02-04 | End: 2025-02-05 | Stop reason: HOSPADM

## 2025-02-04 RX ORDER — ROPINIROLE 0.25 MG/1
0.5 TABLET, FILM COATED ORAL AT BEDTIME
Status: DISCONTINUED | OUTPATIENT
Start: 2025-02-04 | End: 2025-02-05 | Stop reason: HOSPADM

## 2025-02-04 RX ORDER — FENTANYL CITRATE 50 UG/ML
INJECTION, SOLUTION INTRAMUSCULAR; INTRAVENOUS PRN
Status: DISCONTINUED | OUTPATIENT
Start: 2025-02-04 | End: 2025-02-04

## 2025-02-04 RX ORDER — ONDANSETRON 4 MG/1
4 TABLET, ORALLY DISINTEGRATING ORAL EVERY 30 MIN PRN
Status: DISCONTINUED | OUTPATIENT
Start: 2025-02-04 | End: 2025-02-04 | Stop reason: HOSPADM

## 2025-02-04 RX ORDER — HYDROXYCHLOROQUINE SULFATE 200 MG/1
200 TABLET, FILM COATED ORAL 2 TIMES DAILY
Status: DISCONTINUED | OUTPATIENT
Start: 2025-02-04 | End: 2025-02-05 | Stop reason: HOSPADM

## 2025-02-04 RX ORDER — SODIUM CHLORIDE, SODIUM LACTATE, POTASSIUM CHLORIDE, CALCIUM CHLORIDE 600; 310; 30; 20 MG/100ML; MG/100ML; MG/100ML; MG/100ML
INJECTION, SOLUTION INTRAVENOUS CONTINUOUS PRN
Status: DISCONTINUED | OUTPATIENT
Start: 2025-02-04 | End: 2025-02-04

## 2025-02-04 RX ORDER — GABAPENTIN 100 MG/1
100 CAPSULE ORAL 3 TIMES DAILY
Qty: 21 CAPSULE | Refills: 0 | Status: SHIPPED | OUTPATIENT
Start: 2025-02-04 | End: 2025-02-04

## 2025-02-04 RX ORDER — PANTOPRAZOLE SODIUM 40 MG/1
40 TABLET, DELAYED RELEASE ORAL
Status: DISCONTINUED | OUTPATIENT
Start: 2025-02-05 | End: 2025-02-05 | Stop reason: HOSPADM

## 2025-02-04 RX ORDER — ACETAMINOPHEN 325 MG/1
650 TABLET ORAL EVERY 4 HOURS PRN
Qty: 100 TABLET | Refills: 0 | Status: SHIPPED | OUTPATIENT
Start: 2025-02-04

## 2025-02-04 RX ORDER — NALOXONE HYDROCHLORIDE 0.4 MG/ML
0.2 INJECTION, SOLUTION INTRAMUSCULAR; INTRAVENOUS; SUBCUTANEOUS
Status: DISCONTINUED | OUTPATIENT
Start: 2025-02-04 | End: 2025-02-04

## 2025-02-04 RX ORDER — SODIUM CHLORIDE, SODIUM LACTATE, POTASSIUM CHLORIDE, CALCIUM CHLORIDE 600; 310; 30; 20 MG/100ML; MG/100ML; MG/100ML; MG/100ML
INJECTION, SOLUTION INTRAVENOUS CONTINUOUS
Status: DISCONTINUED | OUTPATIENT
Start: 2025-02-04 | End: 2025-02-04 | Stop reason: HOSPADM

## 2025-02-04 RX ORDER — KETOROLAC TROMETHAMINE 15 MG/ML
15 INJECTION, SOLUTION INTRAMUSCULAR; INTRAVENOUS EVERY 8 HOURS
Status: COMPLETED | OUTPATIENT
Start: 2025-02-04 | End: 2025-02-05

## 2025-02-04 RX ORDER — ACETAMINOPHEN 325 MG/1
975 TABLET ORAL EVERY 8 HOURS
Status: DISCONTINUED | OUTPATIENT
Start: 2025-02-04 | End: 2025-02-05 | Stop reason: HOSPADM

## 2025-02-04 RX ORDER — ALBUTEROL SULFATE 90 UG/1
INHALANT RESPIRATORY (INHALATION) PRN
Status: DISCONTINUED | OUTPATIENT
Start: 2025-02-04 | End: 2025-02-04

## 2025-02-04 RX ORDER — FENTANYL CITRATE 50 UG/ML
50 INJECTION, SOLUTION INTRAMUSCULAR; INTRAVENOUS EVERY 5 MIN PRN
Status: DISCONTINUED | OUTPATIENT
Start: 2025-02-04 | End: 2025-02-04 | Stop reason: HOSPADM

## 2025-02-04 RX ORDER — FENTANYL CITRATE 50 UG/ML
25 INJECTION, SOLUTION INTRAMUSCULAR; INTRAVENOUS EVERY 5 MIN PRN
Status: DISCONTINUED | OUTPATIENT
Start: 2025-02-04 | End: 2025-02-04 | Stop reason: HOSPADM

## 2025-02-04 RX ORDER — KETAMINE HYDROCHLORIDE 10 MG/ML
INJECTION INTRAMUSCULAR; INTRAVENOUS PRN
Status: DISCONTINUED | OUTPATIENT
Start: 2025-02-04 | End: 2025-02-04

## 2025-02-04 RX ORDER — CLONIDINE HYDROCHLORIDE 0.1 MG/1
0.1 TABLET ORAL DAILY PRN
Status: DISCONTINUED | OUTPATIENT
Start: 2025-02-04 | End: 2025-02-05 | Stop reason: HOSPADM

## 2025-02-04 RX ORDER — AZELASTINE 1 MG/ML
2 SPRAY, METERED NASAL 2 TIMES DAILY
Status: DISCONTINUED | OUTPATIENT
Start: 2025-02-04 | End: 2025-02-05 | Stop reason: HOSPADM

## 2025-02-04 RX ORDER — NALOXONE HYDROCHLORIDE 0.4 MG/ML
0.2 INJECTION, SOLUTION INTRAMUSCULAR; INTRAVENOUS; SUBCUTANEOUS
Status: DISCONTINUED | OUTPATIENT
Start: 2025-02-04 | End: 2025-02-05 | Stop reason: HOSPADM

## 2025-02-04 RX ORDER — NALOXONE HYDROCHLORIDE 0.4 MG/ML
0.4 INJECTION, SOLUTION INTRAMUSCULAR; INTRAVENOUS; SUBCUTANEOUS
Status: DISCONTINUED | OUTPATIENT
Start: 2025-02-04 | End: 2025-02-05 | Stop reason: HOSPADM

## 2025-02-04 RX ORDER — NALOXONE HYDROCHLORIDE 0.4 MG/ML
0.4 INJECTION, SOLUTION INTRAMUSCULAR; INTRAVENOUS; SUBCUTANEOUS
Status: DISCONTINUED | OUTPATIENT
Start: 2025-02-04 | End: 2025-02-04

## 2025-02-04 RX ORDER — HYDROMORPHONE HYDROCHLORIDE 1 MG/ML
0.2 INJECTION, SOLUTION INTRAMUSCULAR; INTRAVENOUS; SUBCUTANEOUS EVERY 5 MIN PRN
Status: DISCONTINUED | OUTPATIENT
Start: 2025-02-04 | End: 2025-02-04 | Stop reason: HOSPADM

## 2025-02-04 RX ORDER — BUSPIRONE HYDROCHLORIDE 15 MG/1
30 TABLET ORAL 2 TIMES DAILY
Status: DISCONTINUED | OUTPATIENT
Start: 2025-02-04 | End: 2025-02-05 | Stop reason: HOSPADM

## 2025-02-04 RX ORDER — OXYCODONE HYDROCHLORIDE 5 MG/1
5 TABLET ORAL
Status: DISCONTINUED | OUTPATIENT
Start: 2025-02-04 | End: 2025-02-04 | Stop reason: HOSPADM

## 2025-02-04 RX ORDER — POLYETHYLENE GLYCOL 3350 17 G/17G
17 POWDER, FOR SOLUTION ORAL DAILY
Status: DISCONTINUED | OUTPATIENT
Start: 2025-02-05 | End: 2025-02-05 | Stop reason: HOSPADM

## 2025-02-04 RX ORDER — OXYCODONE HYDROCHLORIDE 10 MG/1
10 TABLET ORAL
Status: DISCONTINUED | OUTPATIENT
Start: 2025-02-04 | End: 2025-02-04 | Stop reason: HOSPADM

## 2025-02-04 RX ORDER — ARIPIPRAZOLE 5 MG/1
5 TABLET ORAL EVERY MORNING
Status: DISCONTINUED | OUTPATIENT
Start: 2025-02-05 | End: 2025-02-05 | Stop reason: HOSPADM

## 2025-02-04 RX ORDER — NALOXONE HYDROCHLORIDE 0.4 MG/ML
0.1 INJECTION, SOLUTION INTRAMUSCULAR; INTRAVENOUS; SUBCUTANEOUS
Status: DISCONTINUED | OUTPATIENT
Start: 2025-02-04 | End: 2025-02-04 | Stop reason: HOSPADM

## 2025-02-04 RX ORDER — CYCLOBENZAPRINE HCL 10 MG
10 TABLET ORAL AT BEDTIME
Status: DISCONTINUED | OUTPATIENT
Start: 2025-02-04 | End: 2025-02-05 | Stop reason: HOSPADM

## 2025-02-04 RX ORDER — AMOXICILLIN 250 MG
1-2 CAPSULE ORAL 2 TIMES DAILY
Qty: 30 TABLET | Refills: 0 | Status: SHIPPED | OUTPATIENT
Start: 2025-02-04

## 2025-02-04 RX ORDER — HYDROMORPHONE HYDROCHLORIDE 1 MG/ML
0.4 INJECTION, SOLUTION INTRAMUSCULAR; INTRAVENOUS; SUBCUTANEOUS EVERY 5 MIN PRN
Status: DISCONTINUED | OUTPATIENT
Start: 2025-02-04 | End: 2025-02-04 | Stop reason: HOSPADM

## 2025-02-04 RX ORDER — OXYCODONE HYDROCHLORIDE 10 MG/1
10 TABLET ORAL EVERY 4 HOURS PRN
Status: DISCONTINUED | OUTPATIENT
Start: 2025-02-04 | End: 2025-02-05 | Stop reason: HOSPADM

## 2025-02-04 RX ORDER — PROPOFOL 10 MG/ML
INJECTION, EMULSION INTRAVENOUS CONTINUOUS PRN
Status: DISCONTINUED | OUTPATIENT
Start: 2025-02-04 | End: 2025-02-04

## 2025-02-04 RX ORDER — CEFAZOLIN SODIUM/WATER 2 G/20 ML
2 SYRINGE (ML) INTRAVENOUS SEE ADMIN INSTRUCTIONS
Status: DISCONTINUED | OUTPATIENT
Start: 2025-02-04 | End: 2025-02-04

## 2025-02-04 RX ORDER — POLYETHYLENE GLYCOL 3350 17 G/17G
1 POWDER, FOR SOLUTION ORAL DAILY
Qty: 7 PACKET | Refills: 0 | Status: SHIPPED | OUTPATIENT
Start: 2025-02-04

## 2025-02-04 RX ORDER — ALBUTEROL SULFATE 0.83 MG/ML
2.5 SOLUTION RESPIRATORY (INHALATION) EVERY 4 HOURS PRN
Status: DISCONTINUED | OUTPATIENT
Start: 2025-02-04 | End: 2025-02-05 | Stop reason: HOSPADM

## 2025-02-04 RX ORDER — FLUMAZENIL 0.1 MG/ML
0.2 INJECTION, SOLUTION INTRAVENOUS
Status: DISCONTINUED | OUTPATIENT
Start: 2025-02-04 | End: 2025-02-04

## 2025-02-04 RX ORDER — GABAPENTIN 100 MG/1
100 CAPSULE ORAL 3 TIMES DAILY
Status: DISCONTINUED | OUTPATIENT
Start: 2025-02-04 | End: 2025-02-04

## 2025-02-04 RX ORDER — FENTANYL CITRATE 50 UG/ML
25-50 INJECTION, SOLUTION INTRAMUSCULAR; INTRAVENOUS
Status: DISCONTINUED | OUTPATIENT
Start: 2025-02-04 | End: 2025-02-04

## 2025-02-04 RX ORDER — KETOROLAC TROMETHAMINE 30 MG/ML
INJECTION, SOLUTION INTRAMUSCULAR; INTRAVENOUS PRN
Status: DISCONTINUED | OUTPATIENT
Start: 2025-02-04 | End: 2025-02-04

## 2025-02-04 RX ORDER — BUPIVACAINE HYDROCHLORIDE AND EPINEPHRINE 5; 5 MG/ML; UG/ML
INJECTION, SOLUTION PERINEURAL
Status: COMPLETED | OUTPATIENT
Start: 2025-02-04 | End: 2025-02-04

## 2025-02-04 RX ORDER — MONTELUKAST SODIUM 10 MG/1
10 TABLET ORAL DAILY
Status: DISCONTINUED | OUTPATIENT
Start: 2025-02-05 | End: 2025-02-05 | Stop reason: HOSPADM

## 2025-02-04 RX ORDER — ASPIRIN 81 MG/1
81 TABLET ORAL 2 TIMES DAILY
Status: DISCONTINUED | OUTPATIENT
Start: 2025-02-05 | End: 2025-02-05 | Stop reason: HOSPADM

## 2025-02-04 RX ORDER — OXYCODONE HYDROCHLORIDE 5 MG/1
5 TABLET ORAL EVERY 4 HOURS PRN
Status: DISCONTINUED | OUTPATIENT
Start: 2025-02-04 | End: 2025-02-05 | Stop reason: HOSPADM

## 2025-02-04 RX ORDER — ONDANSETRON 4 MG/1
4 TABLET, ORALLY DISINTEGRATING ORAL EVERY 8 HOURS PRN
Qty: 10 TABLET | Refills: 0 | Status: SHIPPED | OUTPATIENT
Start: 2025-02-04

## 2025-02-04 RX ORDER — LIDOCAINE 40 MG/G
CREAM TOPICAL
Status: DISCONTINUED | OUTPATIENT
Start: 2025-02-04 | End: 2025-02-05 | Stop reason: HOSPADM

## 2025-02-04 RX ORDER — DULOXETIN HYDROCHLORIDE 60 MG/1
120 CAPSULE, DELAYED RELEASE ORAL AT BEDTIME
Status: DISCONTINUED | OUTPATIENT
Start: 2025-02-04 | End: 2025-02-05 | Stop reason: HOSPADM

## 2025-02-04 RX ORDER — ASPIRIN 81 MG/1
81 TABLET ORAL 2 TIMES DAILY
Qty: 60 TABLET | Refills: 0 | Status: SHIPPED | OUTPATIENT
Start: 2025-02-04

## 2025-02-04 RX ORDER — TRANEXAMIC ACID 650 MG/1
1950 TABLET ORAL ONCE
Status: COMPLETED | OUTPATIENT
Start: 2025-02-04 | End: 2025-02-04

## 2025-02-04 RX ORDER — ONDANSETRON 4 MG/1
4 TABLET, ORALLY DISINTEGRATING ORAL EVERY 6 HOURS PRN
Status: DISCONTINUED | OUTPATIENT
Start: 2025-02-04 | End: 2025-02-05 | Stop reason: HOSPADM

## 2025-02-04 RX ORDER — BISACODYL 10 MG
10 SUPPOSITORY, RECTAL RECTAL DAILY PRN
Status: DISCONTINUED | OUTPATIENT
Start: 2025-02-07 | End: 2025-02-05 | Stop reason: HOSPADM

## 2025-02-04 RX ORDER — HYDROMORPHONE HYDROCHLORIDE 1 MG/ML
0.2 INJECTION, SOLUTION INTRAMUSCULAR; INTRAVENOUS; SUBCUTANEOUS
Status: DISCONTINUED | OUTPATIENT
Start: 2025-02-04 | End: 2025-02-05 | Stop reason: HOSPADM

## 2025-02-04 RX ORDER — VANCOMYCIN HYDROCHLORIDE 1 G/20ML
INJECTION, POWDER, LYOPHILIZED, FOR SOLUTION INTRAVENOUS PRN
Status: DISCONTINUED | OUTPATIENT
Start: 2025-02-04 | End: 2025-02-04 | Stop reason: HOSPADM

## 2025-02-04 RX ORDER — PROPOFOL 10 MG/ML
INJECTION, EMULSION INTRAVENOUS PRN
Status: DISCONTINUED | OUTPATIENT
Start: 2025-02-04 | End: 2025-02-04

## 2025-02-04 RX ORDER — AZELASTINE 1 MG/ML
2 SPRAY, METERED NASAL 2 TIMES DAILY
Status: DISCONTINUED | OUTPATIENT
Start: 2025-02-04 | End: 2025-02-04

## 2025-02-04 RX ORDER — HYDROMORPHONE HYDROCHLORIDE 1 MG/ML
0.4 INJECTION, SOLUTION INTRAMUSCULAR; INTRAVENOUS; SUBCUTANEOUS
Status: DISCONTINUED | OUTPATIENT
Start: 2025-02-04 | End: 2025-02-05 | Stop reason: HOSPADM

## 2025-02-04 RX ORDER — PREGABALIN 75 MG/1
150 CAPSULE ORAL 2 TIMES DAILY
Status: DISCONTINUED | OUTPATIENT
Start: 2025-02-04 | End: 2025-02-05 | Stop reason: HOSPADM

## 2025-02-04 RX ORDER — OMEPRAZOLE 40 MG/1
40 CAPSULE, DELAYED RELEASE ORAL DAILY
Qty: 90 CAPSULE | Refills: 3 | Status: SHIPPED | OUTPATIENT
Start: 2025-02-04

## 2025-02-04 RX ORDER — ONDANSETRON 2 MG/ML
INJECTION INTRAMUSCULAR; INTRAVENOUS PRN
Status: DISCONTINUED | OUTPATIENT
Start: 2025-02-04 | End: 2025-02-04

## 2025-02-04 RX ORDER — EPHEDRINE SULFATE 50 MG/ML
INJECTION, SOLUTION INTRAMUSCULAR; INTRAVENOUS; SUBCUTANEOUS PRN
Status: DISCONTINUED | OUTPATIENT
Start: 2025-02-04 | End: 2025-02-04

## 2025-02-04 RX ORDER — CEFAZOLIN SODIUM/WATER 2 G/20 ML
2 SYRINGE (ML) INTRAVENOUS
Status: COMPLETED | OUTPATIENT
Start: 2025-02-04 | End: 2025-02-04

## 2025-02-04 RX ORDER — PREGABALIN 75 MG/1
150 CAPSULE ORAL 2 TIMES DAILY
Status: DISCONTINUED | OUTPATIENT
Start: 2025-02-05 | End: 2025-02-04

## 2025-02-04 RX ORDER — CEFAZOLIN SODIUM 2 G/100ML
2 INJECTION, SOLUTION INTRAVENOUS EVERY 8 HOURS
Status: COMPLETED | OUTPATIENT
Start: 2025-02-04 | End: 2025-02-05

## 2025-02-04 RX ADMIN — PREGABALIN 150 MG: 75 CAPSULE ORAL at 15:56

## 2025-02-04 RX ADMIN — ALBUMIN HUMAN: 0.05 INJECTION, SOLUTION INTRAVENOUS at 09:24

## 2025-02-04 RX ADMIN — SODIUM CHLORIDE, POTASSIUM CHLORIDE, SODIUM LACTATE AND CALCIUM CHLORIDE: 600; 310; 30; 20 INJECTION, SOLUTION INTRAVENOUS at 10:29

## 2025-02-04 RX ADMIN — PHENYLEPHRINE HYDROCHLORIDE 0.5 MCG/KG/MIN: 10 INJECTION INTRAVENOUS at 09:26

## 2025-02-04 RX ADMIN — PHENYLEPHRINE HYDROCHLORIDE 200 MCG: 10 INJECTION INTRAVENOUS at 09:28

## 2025-02-04 RX ADMIN — PHENYLEPHRINE HYDROCHLORIDE 200 MCG: 10 INJECTION INTRAVENOUS at 09:14

## 2025-02-04 RX ADMIN — ACETAMINOPHEN 975 MG: 325 TABLET, FILM COATED ORAL at 15:56

## 2025-02-04 RX ADMIN — TRANEXAMIC ACID 1950 MG: 650 TABLET ORAL at 07:10

## 2025-02-04 RX ADMIN — LIDOCAINE HYDROCHLORIDE 100 MG: 20 INJECTION, SOLUTION INFILTRATION; PERINEURAL at 08:37

## 2025-02-04 RX ADMIN — KETOROLAC TROMETHAMINE 15 MG: 30 INJECTION, SOLUTION INTRAMUSCULAR at 11:47

## 2025-02-04 RX ADMIN — SUCCINYLCHOLINE CHLORIDE 120 MG: 20 INJECTION, SOLUTION INTRAMUSCULAR; INTRAVENOUS; PARENTERAL at 08:37

## 2025-02-04 RX ADMIN — ONDANSETRON 4 MG: 2 INJECTION INTRAMUSCULAR; INTRAVENOUS at 09:06

## 2025-02-04 RX ADMIN — EPHEDRINE SULFATE 5 MG: 5 INJECTION INTRAVENOUS at 09:16

## 2025-02-04 RX ADMIN — EPHEDRINE SULFATE 10 MG: 5 INJECTION INTRAVENOUS at 09:24

## 2025-02-04 RX ADMIN — MIDAZOLAM 1 MG: 1 INJECTION INTRAMUSCULAR; INTRAVENOUS at 07:57

## 2025-02-04 RX ADMIN — AZELASTINE 2 SPRAY: 1 SPRAY, METERED NASAL at 22:24

## 2025-02-04 RX ADMIN — CEFAZOLIN SODIUM 2 G: 2 INJECTION, SOLUTION INTRAVENOUS at 17:50

## 2025-02-04 RX ADMIN — Medication 50 MG: at 08:50

## 2025-02-04 RX ADMIN — FENTANYL CITRATE 50 MCG: 50 INJECTION INTRAMUSCULAR; INTRAVENOUS at 11:50

## 2025-02-04 RX ADMIN — Medication 30 MG: at 10:03

## 2025-02-04 RX ADMIN — SENNOSIDES AND DOCUSATE SODIUM 1 TABLET: 50; 8.6 TABLET ORAL at 19:33

## 2025-02-04 RX ADMIN — KETOROLAC TROMETHAMINE 15 MG: 15 INJECTION, SOLUTION INTRAMUSCULAR; INTRAVENOUS at 15:57

## 2025-02-04 RX ADMIN — BUPIVACAINE 10 ML: 13.3 INJECTION, SUSPENSION, LIPOSOMAL INFILTRATION at 08:00

## 2025-02-04 RX ADMIN — HYDROXYCHLOROQUINE SULFATE 200 MG: 200 TABLET ORAL at 15:58

## 2025-02-04 RX ADMIN — BUPIVACAINE HYDROCHLORIDE AND EPINEPHRINE BITARTRATE 10 ML: 5; .005 INJECTION, SOLUTION PERINEURAL at 08:00

## 2025-02-04 RX ADMIN — FENTANYL CITRATE 50 MCG: 50 INJECTION INTRAMUSCULAR; INTRAVENOUS at 07:55

## 2025-02-04 RX ADMIN — DULOXETINE HYDROCHLORIDE 120 MG: 60 CAPSULE, DELAYED RELEASE ORAL at 22:25

## 2025-02-04 RX ADMIN — SUGAMMADEX 100 MG: 100 INJECTION, SOLUTION INTRAVENOUS at 11:47

## 2025-02-04 RX ADMIN — SODIUM CHLORIDE, POTASSIUM CHLORIDE, SODIUM LACTATE AND CALCIUM CHLORIDE: 600; 310; 30; 20 INJECTION, SOLUTION INTRAVENOUS at 08:28

## 2025-02-04 RX ADMIN — CYCLOBENZAPRINE 10 MG: 10 TABLET, FILM COATED ORAL at 22:25

## 2025-02-04 RX ADMIN — DEXAMETHASONE SODIUM PHOSPHATE 8 MG: 4 INJECTION, SOLUTION INTRAMUSCULAR; INTRAVENOUS at 09:06

## 2025-02-04 RX ADMIN — BUSPIRONE HYDROCHLORIDE 30 MG: 15 TABLET ORAL at 19:33

## 2025-02-04 RX ADMIN — PROPOFOL 50 MG: 10 INJECTION, EMULSION INTRAVENOUS at 11:50

## 2025-02-04 RX ADMIN — Medication 2 G: at 08:57

## 2025-02-04 RX ADMIN — SODIUM CHLORIDE, POTASSIUM CHLORIDE, SODIUM LACTATE AND CALCIUM CHLORIDE: 600; 310; 30; 20 INJECTION, SOLUTION INTRAVENOUS at 14:22

## 2025-02-04 RX ADMIN — KETOROLAC TROMETHAMINE 15 MG: 15 INJECTION, SOLUTION INTRAMUSCULAR; INTRAVENOUS at 22:25

## 2025-02-04 RX ADMIN — ALBUTEROL SULFATE 6 PUFF: 108 INHALANT RESPIRATORY (INHALATION) at 08:41

## 2025-02-04 RX ADMIN — PROPOFOL 200 MG: 10 INJECTION, EMULSION INTRAVENOUS at 08:37

## 2025-02-04 RX ADMIN — FENTANYL CITRATE 50 MCG: 50 INJECTION INTRAMUSCULAR; INTRAVENOUS at 10:53

## 2025-02-04 RX ADMIN — PROPOFOL 50 MCG/KG/MIN: 10 INJECTION, EMULSION INTRAVENOUS at 09:05

## 2025-02-04 RX ADMIN — PHENYLEPHRINE HYDROCHLORIDE 100 MCG: 10 INJECTION INTRAVENOUS at 09:09

## 2025-02-04 RX ADMIN — HYDROMORPHONE HYDROCHLORIDE 0.5 MG: 1 INJECTION, SOLUTION INTRAMUSCULAR; INTRAVENOUS; SUBCUTANEOUS at 08:54

## 2025-02-04 RX ADMIN — ACETAMINOPHEN 975 MG: 325 TABLET, FILM COATED ORAL at 22:25

## 2025-02-04 RX ADMIN — ALBUTEROL SULFATE 6 PUFF: 108 INHALANT RESPIRATORY (INHALATION) at 12:04

## 2025-02-04 RX ADMIN — ROPINIROLE HYDROCHLORIDE 0.5 MG: 0.25 TABLET, FILM COATED ORAL at 22:25

## 2025-02-04 RX ADMIN — MIDAZOLAM 2 MG: 1 INJECTION INTRAMUSCULAR; INTRAVENOUS at 08:28

## 2025-02-04 RX ADMIN — PHENYLEPHRINE HYDROCHLORIDE 100 MCG: 10 INJECTION INTRAVENOUS at 09:13

## 2025-02-04 RX ADMIN — EPHEDRINE SULFATE 10 MG: 5 INJECTION INTRAVENOUS at 09:21

## 2025-02-04 ASSESSMENT — ACTIVITIES OF DAILY LIVING (ADL)
ADLS_ACUITY_SCORE: 34
ADLS_ACUITY_SCORE: 35
ADLS_ACUITY_SCORE: 34
ADLS_ACUITY_SCORE: 35
ADLS_ACUITY_SCORE: 34

## 2025-02-04 ASSESSMENT — LIFESTYLE VARIABLES: TOBACCO_USE: 1

## 2025-02-04 ASSESSMENT — ENCOUNTER SYMPTOMS
DYSRHYTHMIAS: 0
SEIZURES: 0

## 2025-02-04 NOTE — DISCHARGE INSTRUCTIONS
ARTHROSCOPIC SHOULDER SURGERY: SUBACROMIAL DECOMPRESSION WITH / WITHOUT DISTAL CLAVICLE EXCISION    POST OP INSTRUCTIONS  [phone]   of Orthopedic Surgery  [facility]      ABOUT YOUR SURGERY  With this surgery, we arthroscopically cleaned out the bursae over the top of the rotator cuff and shaved bone spurs off the underside of the acromion. If applicable, we may have additionally shaved off the arthritis of your acromioclavicular (AC) joint as well. With time, these treatments will improve pain and motion at your shoulder joint.    POST OP OFFICE VISIT  You should have an office visit scheduled with Dr. Dixon, within 7-10 days after your surgery. If you do not have this appointment please call [phone] to set up an appointment.      MEDICATIONS  You will receive prescriptions for pain medication (and other applicable medications) on the day of your surgery if you have not already had it arranged to  from the office or sent to your pharmacy ahead of time.   The goal of the multimodal pain regimen is to minimize the amount of narcotic (opioid) medications that you require for postoperative pain control - if possible, try and take the non-narcotic pain medications instead of the narcotic medication unless you feel you need it. You should discuss these medications with your primary care doctor to make sure there are no interactions with any other medications that you are taking, and that your kidneys and/or liver are healthy enough for their use.  Unless otherwise specified, you will be prescribed Oxycodone 5 mg (#10 total) for pain. You may take 1 or 2 tablets every 4-6 hours as needed.This medication is OPTIONAL, and are not necessary if your pain is tolerable.  We recommend you take your previously prescribed Celecoxib 100 mg twice daily for two weeks, and then return to your normal dosing. This medication will treat inflammation and pain.  We  recommend you take your previously prescribed Lyrica 150 mg every . This medication affects chemicals and nerves in the body and is another component of the multi-modal pain regimen. Use of this medication will hopefully reduce comsumption of the narcotic pain medications. Its use can contribute to drowsiness, sleepiness, or dizziness in some patients.    You may start taking Tylenol immediately post-op. Unless you have a medical condition that prevents the use of Tylenol, and do not take Celebrex and Ibuprofen together.  You will also receive a prescription for a nausea medicine called Ondansetron 4 mg ODT (Zofran; #10 total). You may take one tablet every 8 hours as needed for nausea.  You are strongly encouraged to take a stool softener and/or laxative while taking narcotic pain medicine. You will be prescribed Senna-Docusate mg (#30 total), a stool softener, which can be taken twice daily for constipation as needed.  Finally, you will receive a prescription for Prilosec 20 mg ( #14 total) which we recommend to take every morning in order to combat the occurrence of heartburn while on the medications above in the postoperative period.  If you are having pain beyond what is expected from surgery and you need to speak to someone from our office, please do not wait until after 4 PM on weekdays or over the weekend, as we will not be able to address it.  It is hospital policy that we do not refill narcotic pain medicine after hours or on weekends.  Please call [phone] for further instructions.    ANTICOAGULATION (BLOOD THINNERS)  Ambulation, foot/hand pumps, and movement of the surgical site within the confines of the weightbearing and motion restrictions below are additional ways to reduce the occurrence of blood clots after surgery.    DRESSING CHANGES, WOUND CARE, AND BATHING  You will leave the operating room with a bulky, compressive dressing over your surgical incision. At 24 hours after surgery, this can  be removed and replaced with waterproof band-aids. You may notice a small amount of clear or reddish drainage in the center of the waterproof bandage - this is normal. However, if you saturate through the waterproof bandage and it looses its seal over the surgical site, please replace accordingly until you are seen for your initial postoperative visit.    Please keep your wounds clean and dry. You may shower with waterproof bandage over the wound, but you should not bathe or soak your surgical site. Please do not apply any lotions, creams, or ointments directly to the incisions until 30 days after surgery. After your sutures are removed at your first post op visit, you may get your incisions wet and they no longer need to be covered.  AVOID STEAM ROOMS, SWIMMING POOLS, AND TUBS FOR A FULL 4 WEEKS AFTER THE DATE OF YOUR SURGERY TO AVOID INFECTION.    SWELLING / INFLAMMATION CONTROL  Icing the surgical is very important following surgery. In most cases you will be offered a Polar Care unit to use after surgery.  This unit is a cooler which circulates cold water through a cuff. This will be provided to you by [employer] if you wish to obtain one.  This device may be left on the surgical site for extended periods of time. There is a rental fee to use this device, or it may be purchased directly. For any questions regarding this equipment please call "[employer] directly at [phone].  If you choose to use regular ice packs, please limit icing to 20 minute sessions every 2-3 hours at most to avoid any skin problems.   Icing should be continued for the first several weeks following surgery.   In addition to icing, compression with a support/wrap and elevation of the affected limb above the level of your heart will promote good circulation and reduce both swelling and pain.  It is normal to have swelling and/or bruising around your incisions, or about the surgical extremity after surgery. This will gradually resolve after  surgery.  PROLONGED FEVER OVER 102 DEGREES, THICK DRAINAGE, CHEST PAIN, SHORTNESS OF BREATH, OR CALF PAIN SHOULD BE REPORTED IMMEDIATELY.  PLEASE CALL US [phone] IF YOU EXPERIENCE THESE SYMPTOMS.     WEIGHTBEARING STATUS / IMMOBILIZATION  You should not bear any weight with your surgical extremity while it is in the sling, and until you are instructed as part of the postoperative rehabilitation protocol.    REHABILITATION / PHYSICAL THERAPY  You may use the sling for comfort and transition out of the sling at your own pace. This usually happens during the first week. It does not need to be worn at night for sleep.   Physical Therapy can be started immediately. This can be done at any facility you like and a prescription for PT will be provided at your time of surgery.    DRIVING AFTER SURGERY  The ability for someone to resume driving after surgery is seldom a medical question, but more often a legal question.  Driving with any form of a brace on may be interpreted as driving while impaired.  It is the responsibility of all licensed drivers to drive safely at all times no matter what their permanent or temporary impairment may be.      WORK AFTER SURGERY  Discussions of return to work will depend on the type of work that you perform, and the immediacy of needs to return. This discussion will be had preoperatively and at each visit postoperatively in order to help you, the patient, to get back to what you need to be doing in a timely fashion, while not compromising your surgical outcome.     DIET AFTER SURGERY  A balanced, healthy diet high in proteins is most valuable for the body to utilize during the healing process after surgery. There are otherwise no formal restrictions on your diet after surgery.     SLEEPING TIPS  Unfortunately, it is often difficult to get quality sleep following shoulder surgery of any kind. Many patients do better in a recliner or propped up on pillows.  It may be best to take pain  medicine shortly before bed, as these medicines typically have a sedating effect. If you still have to wear a sling, sometimes removing the pillow that is attached may help significantly. It is attached with velcro. Gunslinger braces should not be modified.    OTHER COMMENTS  We recommend to abstain from alcohol or tobacco use in the postoperative period. This is for general health reasons, as combination with the prescribed medications can be dangerous, but also to prevent adversely affecting the body's healing response to your surgery.      If you have questions and need to speak to the nurse, please send us a message via [employer], or contact us at our triage center at [phone]  If you have an emergency after hours or on the weekend call the physician on call at [phone] or [phone]

## 2025-02-04 NOTE — PROGRESS NOTES
Pt arrived on unit at 1415.    Pt settled in room.      Admission Note    Reason for admission: L total shoulder   Primary team notified of pt arrival.  Admitted from: PACU  Via: bed  Accompanied by: aunt and   Belongings: Placed in closet; valuables sent home with family  Teaching: Orientation to unit and call light- call light within reach, use of console, meal times, when to call for the RN, and enforced importance of safety.  IV Access: R PIV infusing LR 75 ml/hr  Telemetry: No  2 RN Skin Assessment Completed with: Luiza MEDINA RN   Suction/Ambu bag/Flowmeter at bedside: Yes    Pt status:     Temp:  [98.1  F (36.7  C)-99.5  F (37.5  C)] 98.6  F (37  C)  Pulse:  [] 97  Resp:  [17-23] 20  BP: (101-147)/(54-95) 117/74  SpO2:  [88 %-96 %] 93 %     Pt sat on toilet for several minutes but unable to urinate. Bladder scan: 490. Pt not reporting any symptoms. Oncoming nurse notified to continue to monitor.     Healed cut on R forefinger, L pimple estela area, R breast fold pimple. Blanchable redness: L neck and coccyx. Midline back healed scar.     L hand numb. Pt had nerve block.     Denies pain.     LBM: 2/4.     Dressings CDI.

## 2025-02-04 NOTE — OP NOTE
PATIENT NAME: Zayra Ramirez  1970  [id number]     DATE OF PROCEDURE: 2/4/25    PREOPERATIVE DIAGNOSIS: left shoulder osteoarthritis    POSTOPERATIVE DIAGNOSIS: \left shoulder osteoarthritis     PROCEDURE: left total shoulder arthroplasty.     STAFF SURGEON: Nataliia Dixon MD.     ASSISTANT: Leigh Ann Hutson PA-C; Saturnino Martel MD    The assistance of Leigh Ann Hutson was needed for assistance with retraction, patient positioning and due to the absence of a suitably qualified available orthopedic resident to assist.     ANESTHESIA: General endotracheal anesthesia and regional block    ESTIMATED BLOOD LOSS: 200 mL.     COMPLICATIONS: None.     DRAINS: None    IMPLANTS:   Tornier simpliciti size 1 nucleus, [id number] head, Size M30 cortiloc glenoid    BRIEF PATIENT HISTORY: This patient is known to me from clinic where we have discussed the radiographic and physical exam findings. The patient's shoulder has been causing lifestyle limiting pain and she feels that nonoperative management  have not adequately relieved these symptoms. Therefore, the patient wished to consider surgical options. We discussed total shoulder arthroplasty including the risks and benefits and perioperative rehabilitation plan. Risks discussed include periprosthetic fracture, infection, dislocation, pain, decreased range of motion, neurovascular injury, need for revision surgery, medical and anesthesia related complications. The patient had the opportunity for questions to be answered and wished to proceed with surgery. Informed consent was completed.     DESCRIPTION OF PROCEDURE: The patient was identified in the preoperative area and the correct left shoulder was marked for surgery. The patient was provided an interscalene block by our anesthesia colleagues. Zayra Ramirez was taken to the operating room where general endotracheal anesthesia was induced. Zayra Ramirez was moved to the operating table in the beachchair position with all bony  prominences well padded. The head was placed in a head valle with the neck in neutral position. The left upper extremity was prepped and draped in the usual sterile fashion. A timeout was held in accordance with hospital policy, confirming correct patient, site, side, procedure and administration of IV antibiotics prior to incision.     A standard deltopectoral approach was used.  We dissected down to the deltopectoral interval and the cephalic vein was taken medially.  Deep retractors were placed under the conjoined tendon and deltoid.  The long head of the biceps tendon was identified and found to have significant tendinosis and inflammation.  A biceps tenodesis was performed with #5 Ethibond suture to the pectoralis major tendon.  The remaining biceps was followed up into the rotator interval where it was released.  The rotator cuff was inspected and intact.  A lesser tuberosity osteotomy was performed followed by a peal of the subscapularis with the capsule along the humeral neck extending inferiorly away from the humeral head.  Multiple #5 ethibond sutures were placed around the bone tendon junction of the lesser tuberosity osteotomy. This allowed us to dislocate the head without difficulty.  The humeral head was inspected and there is evidence of osteoarthritic wear and cartilage loss.  Osteophytes were removed with a rongeur.    An extra medullary humeral cutting guide was used.  Humeral osteotomy was performed with an oscillating saw.  The head fragment was passed off to the back table.  A size 1 guide was selected and ultimately a size 2 guide was used to place a central pin. The cut surface was reamed and the center point drilled. A size 1 trial nucleus was placed with a cut protector. We next turned attention to the glenoid exposure.    The humerus was retracted posteriorly.  The subscap was mobilized releasing adhesions in the coracohumeral and glenohumeral ligaments.  The axillary nerve was palpated  and protected during the exposure.  The remaining labrum was removed from around the glenoid circumferentially.  The glenoid had evidence of osteoarthritic wear with cartilage loss.  There was central glenoid erosion with approximately 5 degrees of retroversion.  The glenoid drill guide was placed with a 5 degree offset handle and used to drill the guidepin in the center position.  The glenoid face was then reamed over the guidepin, reaming slightly more anterior bone.  A medium size guide was selected and found to provide adequate coverage. The pegs holes were drilled. A trial glenoid was placed and found to have good fit and be stable. The trial was removed. The joint was irrigated. Simplex cement was mixed and the 3 peripheral pegs holes were packed with a thrombin soaked sponge. Once the cement was ready, the peripheral holes were packed with cement three times each, with care not to place cement in the cortiloc hole. The final implant was impacted by hand. The implant was stable.     We next turned attention back to the humerus at this time.  The cut protector was removed.  We placed a size 44x18 head based on the humeral head cut .  The shoulder was reduced.  The shoulder had excellent stability in all planes.  There was approximately 50% posterior translation with good shuck back. There was good range of motion present in forward flexion, external rotation, internal rotation.  The humeral trial components were removed at this time.  #2 Fiberwire sutures were passed through the humeral neck for later subscap repair.  A size 1 nuceleus was open.  The canal was thoroughly irrigated.  The nucelus and head were impacted down the humeral  canal obtaining an excellent press-fit.   The joint was reduced.  The joint was thoroughly irrigated with pulsatile lavage.  1 g of vancomycin powder was applied to the joint.      For the lesser tuberosity repair, the sutures initially placed through the humeral neck were passed  back through the subscapularis in a horizontal mattress fashion. Next, the sutures initially placed through the subscap where passed back through the site of the osteotomy. A towel clamp was used to reduce the lesser tuberosity osteotomy. The sutures through osteotomy site were initially tied down followed by the horizontal mattress sutures through the subscap.   Hemostasis was obtained. The rotator interval was closed with #5 ethibond.    The deltopectoral interval was closed with #0 Ethibond suture.  The deep layer was approximated with 0 Vicryl.  Subcutaneous tissues were closed with 2-0 Monocryl and the skin was closed with 3-0 Monocryl.  The wounds were cleaned and dried.  Dermabond was applied followed by Aquacel waterproof dressing.  The drapes were taken down.  The arm was placed into a sling.  The patient was awoken from general anesthesia and taken to PACU in stable condition.  There were no apparent intraoperative complications.  Sponge and needle count were correct at the end of the case.  I spoke to the patient's family in the waiting room.    Nataliia Dixon MD

## 2025-02-04 NOTE — CONSULTS
[facility]  Consult Note - Hospitalist Service  Date of Admission:  2/4/2025  Consult Requested by: Lino Martel MD  Reason for Consult: Medical co-management    Assessment & Plan   Zayra Ramirez is a 55 year old female patient with a past medical history significant for ILD, moderate persistent asthma, ROBERT w/ CPAP (O2 3L nocturnal), HTN, allergic rhinitis, former tobacco use, anxiety, depression, PTSD, RLS, neuropathy, borderline personality, morbid obesity, GERD, TMJ syndrome, seronegative rheumatoid arthritis, chronic immunosuppression, chronic pain, pseudogout, anemia, ACDF with plate fixation C3-C5 2017, MSSA epidural abscess 2019 s/p laminectomies T11-L1 and antibiotics (associated with spinal cord stimulator removed 8/13/19), hysterectomy, R shoulder pseudogout, primary osteoarthritis of left shoulder who was admitted to [facility] after undergoing left total shoulder arthroplasty with Dr. Dixon on 2/4/25.    S/p Left Total Shoulder Arthroplasty (2/4/25)  Left Shoulder Osteoarthritis  EBL 150cc. No apparent intra-op complications. Patient seen in her room resting comfortable in bed. Pain controlled, block wearing off appropriately.   - Management per primary orthopedics team:  -Pain management: Pre-op block, tylenol, celecoxib, gabapentin, narcotic for breakthrough, ice  -Nausea: Discharge with Zofran for post-op nausea  -Activity: up ad aminta, no left shoulder ROM   -Weight bearing status: NWB LUE  -Sling: Ultrasling at all times except hygiene   -DVT prophylaxis: ASA 81mg BID for 4 weeks starting POD1 at 0800  -Wound Care: Dry dressings x 72 hours post-op, ok to shower but no soaking or aggressive scrubbing  -Disposition: Home POD1 pending progress with therapy, tolerating diet, medically stabled     Acute Hypoxic Respiratory Failure  ROBERT on CPAP  At baseline, patient uses a CPAP with 3L bled in at night. Currently needing 4L at rest with O2 sat 91%  "after arriving from PACU. Does not feel short of breath. No fevers, chills, or cough. Lung exam benign with good air movement bilaterally. Likely her oxygen needs are related to a combination of lingering anesthesia, post-op atelectasis and underlying lung disease (ROBERT, ILD).   - Oxygen support. Continue home CPAP at night and when napping.   - Frequent IS use   - Early mobilization and ambulation   - Albuterol nebulizers   - If oxygen cannot be steadily weaned (or if needs are increasing), then would pursue imaging (CXR followed by CT PE if needed).    Moderate Persistent Asthma  ILD   - Albuterol prn   - Continue PTA Singulair 10mg bedtime   - Patient reports not taking her Trelegy Ellipta consistently due to intermittent issues with thrush (no issues currently) and plans to discuss with her outpatient pulmonologist. Reports if she brushes her teeth afterwards this seems to help.    Chronic Pain  Peripheral Neuropathy  TMJ Syndrome   - Continue PTA Flexeril 10mg bedtime   - Continue PTA Lyrica 150mg BID (0800, 1300)   - PTA Oxycodone 5mg TID prn held for now, see acute pain management as above    Seronegative Rheumatoid Arthritis   - Continue Plaquenil 200mg BID, pre-op note indicated patient should take day of surgery    Anxiety  Depression  PTSD   - Continue PTA Abilify 5mg daily, Buspar 30mg BID, Cymbalta 120mg bedtime   - Clonidine daily prn for severe anxiety \"feeling trapped\"    Allergic Rhinitis   - Continue PTA azelastine nasal spray,     GERD   - Continue PTA PPI    RLS   - Continue PTA Requip 0.5mg bedtime    HTN  BP controlled currently. Does not use medication at home.   - Continue to monitor    Clinically Significant Risk Factors Present on Admission                   # Hypertension: Noted on problem list           # Obesity: Estimated body mass index is 31.79 kg/m  as calculated from the following:    Height as of 1/7/25: 1.626 m (5' 4\").    Weight as of this encounter: 84 kg (185 lb 3 oz).       # " Asthma: noted on problem list        Inderjit Anthony PA-C  Hospitalist Service  Securely message with [employer] (more info)  Text page via [facility] Paging/Directory   ______________________________________________________________________    Chief Complaint   S/p shoulder replacement    History is obtained from the patient and EMR    History of Present Illness   Zayra Ramirez is a 55 year old female patient with a past medical history significant for ILD, moderate persistent asthma, ROBERT w/ CPAP (O2 3L nocturnal), HTN, allergic rhinitis, former tobacco use, anxiety, depression, PTSD, RLS, neuropathy, borderline personality, morbid obesity, GERD, TMJ syndrome, seronegative rheumatoid arthritis, chronic immunosuppression, chronic pain, pseudogout, anemia, ACDF with plate fixation C3-C5 2017, MSSA epidural abscess 2019 s/p laminectomies T11-L1 and antibiotics (associated with spinal cord stimulator removed 8/13/19), hysterectomy, R shoulder pseudogout, primary osteoarthritis of left shoulder who was admitted to [facility] after undergoing left total shoulder arthroplasty with Dr. Dixon on 2/4/25.    Patient reports currently pain is well controlled. Breathing feels okay. No fevers, chills, chest pain/pressure, shortness of breath, nausea, vomiting, abdominal pain reported. She has not urinated yet.    Past Medical History    Past Medical History:   Diagnosis Date    Allergic rhinitis     Anemia     Arthritis     Asthma     copd    Cervical pseudoarthrosis, sequela     Dental abscess 08/2015    Depressive disorder     Diaphragmatic hernia 07/11/2016    Gastroesophageal reflux disease     History of emphysema (H)     Hoarseness     Hypertension     Obstructive sleep apnea     Other chronic pain     Primary osteoarthritis of left shoulder     Respiratory bronchiolitis interstitial lung disease (H)     Sleep apnea     Smoker 11/02/2015       Past Surgical History   Past Surgical History:   Procedure Laterality Date     CERVICAL FUSION      COLONOSCOPY      COLONOSCOPY N/A 02/06/2020    Procedure: COLONOSCOPY, WITH POLYPECTOMY AND BIOPSY;  Surgeon: Julian Mccullough MD;  Location:  GI    CYSTOSCOPY      ENT SURGERY      ESOPHAGOSCOPY, GASTROSCOPY, DUODENOSCOPY (EGD), COMBINED N/A 02/06/2020    Procedure: ESOPHAGOGASTRODUODENOSCOPY (EGD);  Surgeon: Julian Mccullough MD;  Location:  GI    EXCISE LESION INTRAORAL Bilateral 10/03/2018    Procedure: EXCISE LESION INTRAORAL;  Wide Local Excision Of of Left Oral Cavity Ulcer;  Surgeon: Morro Mijares MD;  Location: [facility] OR    GYN SURGERY      HC DRAIN SKIN ABSCESS SIMPLE/SINGLE  03/16/2012    Procedure:INCISION AND DRAINAGE, ABSCESS, SIMPLE; Surgeon:CHRISTIANO HANCOCK; Location: GI    HEAD & NECK SURGERY      HYSTERECTOMY      HYSTERECTOMY      INCISION AND DRAINAGE ABDOMEN WASHOUT, COMBINED      INJECT EPIDURAL CERVICAL N/A 10/14/2021    Procedure: Cervical 7- Thoracic 1 epidural steroid injection with fluoroscopy;  Surgeon: Tram Florian MD;  Location: [facility] OR    INJECT EPIDURAL LUMBAR Right 09/15/2020    Procedure: Lumbar5- sacral 1 epidural steroid injection with fluoroscopy;  Surgeon: Tram Florian MD;  [facility]: [facility] OR    INJECT EPIDURAL LUMBAR Right 06/29/2021    Procedure: Lumbar 5 sacral 1 epidural steroid injection with fluoroscopy;  Surgeon: Tram Florian MD;  Location: [facility] OR    INJECT SACROILIAC JOINT Bilateral 06/16/2020    Procedure: Bilateral sacroiliac joint steroid injection with fluoroscopy;  Surgeon: Tram Florian MD;  Location: [facility] OR    LAMINECTOMY THORACIC ONE LEVEL N/A 08/19/2019    Procedure: LAMINECTOMY, SPINE, THORACIC, 11-12 and Part of Lumbar 1, DRAINAGE OF EPIDURAL ABCESS, Epidural Drain Placement X 2;  Surgeon: Yadiel Beal MD;  Location: [facility] OR    OPTICAL TRACKING SYSTEM FUSION POSTERIOR CERVICAL THREE + LEVELS N/A 2/12/2024    Procedure: Posterior instrumented spinal fusion cervical 2-5; laminectomies cervical 3-4;  use of local autograft and crushed cancellous allograft.;  Surgeon: Philpi Wilhelm MD;  Location:  OR    OPTICAL TRACKING SYSTEM FUSION SPINE POSTERIOR LUMBAR THREE+ LEVELS N/A 11/30/2022    Procedure: Posterior Instrumented Spinal Fusion Thoracic 8 to Sacrum with Bilateral Pelvic Fixation; Transforaminal Lumbar Interbody Fusion with Erickson-Nixon Osteotomy Lumbar 1 to Sacral 1 (5 levels); Use of Infuse Bone Morphogenetic Protein Large Kit and Allograft;  Surgeon: Philip Wilhelm MD;  Location:  OR    ORTHOPEDIC SURGERY      WY PERCUT IMPLNT NEUROELECT,EPIDURAL N/A 08/08/2019    Procedure: TRIAL, SPINAL CORD STIMULATOR WITH [employer];  Surgeon: Sipple, Daniel Peter, DO;  Location: [facility];  Service: Pain    RADIO FREQUENCY ABLATION / DESTRUCTION OF SACROILOAC JOINT DORSAL PRIMARY RAMUS Bilateral 12/17/2019    Procedure: Bilateral lumbar radiofrequency ablation with fluoroscopy and intravenous sedation ( Lumbar 2,3,4,5 medial branch nerves for the bilateral lumbar3-4, 4-5 and 5-sacral1 joints.;  Surgeon: Tram Florian MD;  Location:  OR    RADIO FREQUENCY ABLATION / DESTRUCTION OF SACROILOAC JOINT DORSAL PRIMARY RAMUS Right 11/17/2020    Procedure: Right lumbar medial branch nerve radiofrequency ablation right L2,3,4,5 nerves supplying the right L3-4, L4-5 and L5-S1 facet joints;  Surgeon: Tram Florian MD;  Location: [facility] [place]    spinal cord stimulator  08/08/2019    spinal cord stimulator removal  08/13/2019       Medications   Medications Prior to Admission   Medication Sig Dispense Refill Last Dose/Taking    acetaminophen (TYLENOL) 500 MG tablet Take 1-2 tablets (500-1,000 mg) by mouth every 6 hours as needed for mild pain 100 tablet 1 Past Month    albuterol (PROAIR HFA/PROVENTIL HFA/VENTOLIN HFA) 108 (90 Base) MCG/ACT inhaler Inhale 2 puffs into the lungs every 6 hours as needed for shortness of breath / dyspnea or wheezing Ventolin please 18 g 2 2/4/2025 at  5:00  AM    ARIPiprazole (ABILIFY) 5 MG tablet Take 5 mg by mouth every morning   2/4/2025 at  5:00 AM    azelastine (ASTELIN) 0.1 % nasal spray Spray 2 sprays in nostril 2 times daily   2/3/2025 at  7:00 AM    busPIRone HCl (BUSPAR) 30 MG tablet Take 30 mg by mouth 2 times daily    2/4/2025 at  5:00 AM    celecoxib (CELEBREX) 100 MG capsule TAKE 1 CAPSULE (100 MG) BY MOUTH 2 TIMES DAILY. AS NEEDED FOR JOINT PAIN 60 capsule 2 1/30/2025    cloNIDine (CATAPRES) 0.1 MG tablet Take 1 tablet by mouth as needed   1/28/2025    cyclobenzaprine (FLEXERIL) 10 MG tablet Take 1 tablet (10 mg) by mouth at bedtime. 90 tablet 1 2/3/2025 at  8:00 PM    DULoxetine (CYMBALTA) 60 MG capsule Take 120 mg by mouth At Bedtime    2/3/2025 at  8:00 PM    hydroxychloroquine (PLAQUENIL) 200 MG tablet Take 1 tablet (200 mg) by mouth 2 times daily 180 tablet 3 2/4/2025 at  5:00 AM    montelukast (SINGULAIR) 10 MG tablet Take 1 tablet by mouth At Bedtime   More than a month    oxyCODONE (ROXICODONE) 5 MG tablet Take 1 tablet (5 mg) by mouth See Admin Instructions. May take one tablet 2-3 times daily prn max of 75 tabs in 30 days. 75 tablet 0 2/3/2025 at  8:00 AM    rOPINIRole (REQUIP) 0.5 MG tablet Take 1 tablet (0.5 mg) by mouth at bedtime. 90 tablet 1 2/3/2025 at  8:00 PM    TRELEGY ELLIPTA 100-62.5-25 MCG/ACT oral inhaler Inhale 1 puff into the lungs daily.   2/3/2025 at  8:00 AM    albuterol (PROVENTIL) (2.5 MG/3ML) 0.083% neb solution INHALE 3 ML VIA A NEBULIZER 4 TIMES DAILY IF NEEDED.       EPINEPHrine (ANY BX GENERIC EQUIV) 0.3 MG/0.3ML injection 2-pack Inject 0.3 mLs (0.3 mg) into the muscle as needed for anaphylaxis May repeat one time in 5-15 minutes if response to initial dose is inadequate. 2 each 1     insulin pen needle (32G X 4 MM) 32G X 4 MM miscellaneous Use 1 NEEDLE WEEKLY 12 each 3     naloxone (NARCAN) 4 MG/0.1ML nasal spray Spray 1 spray (4 mg) into one nostril alternating nostrils as needed for opioid reversal every 2-3 minutes  until assistance arrives 0.2 mL 0     OXYGEN-HELIUM IN 3L @ night    Oxygen only not helium       polyethylene glycol (MIRALAX) 17 GM/Dose powder Take 17 g by mouth daily (Patient taking differently: Take 17 g by mouth as needed.)       pregabalin (LYRICA) 150 MG capsule Take 1 capsule by mouth in the morning and around 1 PM 60 capsule 5     Respiratory Therapy Supplies ([facility] CPAP FILTER) MISC        SENNA-docusate sodium (SENNA S) 8.6-50 MG tablet Take 1 tablet by mouth at bedtime (Patient not taking: Reported on 1/7/2025) 30 tablet 2     [DISCONTINUED] omeprazole (PRILOSEC) 40 MG DR capsule Take 1 capsule (40 mg) by mouth daily. (Patient taking differently: Take 40 mg by mouth at bedtime.) 90 capsule 3 2/3/2025 at  8:00 PM    [DISCONTINUED] pregabalin (LYRICA) 150 MG capsule Take 1 capsule by mouth in the morning and around 1 PM 60 capsule 3 2/4/2025 at  5:00 AM          Review of Systems    The 10 point Review of Systems is negative other than noted in the HPI or here.    Social History   I have reviewed this patient's social history and updated it with pertinent information if needed.  Social History     Tobacco Use    Smoking status: Former     Current packs/day: 0.00     Average packs/day: 1.5 packs/day for 35.0 years (52.5 ttl pk-yrs)     Types: Cigarettes     Start date: 1/1/1996     Quit date: 11/14/2021     Years since quitting: 3.2    Smokeless tobacco: Former     Quit date: 11/14/2021   Vaping Use    Vaping status: Former   Substance Use Topics    Alcohol use: Never    Drug use: Yes     Types: Marijuana     Comment: smoke 2x times a week         Family History   I have reviewed this patient's family history and updated it with pertinent information if needed.  Family History   Problem Relation Age of Onset    Asthma Mother     Restless Leg Syndrome Mother     Other Cancer Father         base of tongue cancer at age ~65    Hypertension Father     Back Pain Father     Restless Leg Syndrome  Father     Coronary Artery Disease Father     Diabetes Father     Depression Father     Anxiety Disorder Father     Osteoporosis Father     Restless Leg Syndrome Sister     Breast Cancer Maternal Aunt 55    Anesthesia Reaction No family hx of     Deep Vein Thrombosis (DVT) No family hx of     Thyroid Cancer No family hx of     Multiple endocrine neoplasia No family hx of          Allergies   Allergies   Allergen Reactions    Bee Venom Anaphylaxis    Doxycycline Anaphylaxis     Patient thinks it may have been just nausea and vomiting, however unable to confirm  Other reaction(s): throat closes    Erythromycin      Other reaction(s): Vomiting      Hydrocodone-Acetaminophen Itching        Physical Exam   Vital Signs: Temp: 98.1  F (36.7  C) Temp src: Oral BP: 130/83 Pulse: 97   Resp: 20 SpO2: (!) 91 % O2 Device: Nasal cannula Oxygen Delivery: 4 LPM  Weight: 185 lbs 2.98 oz    GENERAL: Alert and oriented x 3. Well nourished, well developed.  No acute distress.    HEENT: Normocephalic, atraumatic. Anicteric sclera. Mucous membranes moist.   CV: RRR. S1, S2. No murmurs appreciated.   RESPIRATORY: Effort normal on 4L. Lungs with one very soft expiratory wheeze RUL, otherwise clear with good air movement bilaterally.  MUSCULOSKELETAL: Surgical dressing left shoulder CDI, sling present, radial pulses 2+, motor and sensation grossly intact.   EXTREMITIES: No gross deformities. No peripheral edema.   SKIN: Grossly warm, dry, and intact. No jaundice. No rashes.     Medical Decision Making       62 MINUTES SPENT BY ME on the date of service doing chart review, history, exam, documentation & further activities per the note.      Data   Imaging results reviewed over the past 24 hrs:   Recent Results (from the past 24 hours)   POC US Guidance Needle Placement    Impression    Left brachial plexus block   XR Shoulder Left Port G/E 2 Views    Narrative    EXAM: XR SHOULDER LEFT PORT G/E 2 VIEWS  LOCATION: [facility]  DATE: 2/4/2025    INDICATION: Status post surgery  COMPARISON: 09/13/2024.      Impression    IMPRESSION:    Extensive postoperative changes status post left shoulder arthroplasty. No evidence of hardware complication. Mild degenerative changes in the AC joint. No acute fracture or dislocation.     Recent Labs   Lab 02/04/25  0630   *

## 2025-02-04 NOTE — PHARMACY-ADMISSION MEDICATION HISTORY
Pharmacist Admission Medication History    Admission medication history is complete. The information provided in this note is only as accurate as the sources available at the time of the update.    Information Source(s): Patient and CareEverywhere/SureScripts via in-person    Pertinent Information: n/a    Changes made to PTA medication list:  Added: None  Deleted: None  Changed:   Clonidine -> 0.1mg daily prn for anxiety/panic attack  Azelastine -> 2 sprays both nostrils BID   Montelukast ->10mg in the morning (prev at bedtime)      Allergies reviewed with patient and updates made in EHR: yes    Medication History Completed By: Capo Grady [facility] 2/4/2025 3:40 PM    PTA Med List   Medication Sig Last Dose/Taking    acetaminophen (TYLENOL) 325 MG tablet Take 2 tablets (650 mg) by mouth every 4 hours as needed for other (mild pain). Taking As Needed    acetaminophen (TYLENOL) 500 MG tablet Take 1-2 tablets (500-1,000 mg) by mouth every 6 hours as needed for mild pain Past Month    albuterol (PROAIR HFA/PROVENTIL HFA/VENTOLIN HFA) 108 (90 Base) MCG/ACT inhaler Inhale 2 puffs into the lungs every 6 hours as needed for shortness of breath / dyspnea or wheezing Ventolin please 2/4/2025 at  5:00 AM    ARIPiprazole (ABILIFY) 5 MG tablet Take 5 mg by mouth every morning 2/4/2025 at  5:00 AM    aspirin 81 MG EC tablet Take 1 tablet (81 mg) by mouth 2 times daily. Taking    azelastine (ASTELIN) 0.1 % nasal spray Spray 2 sprays into both nostrils 2 times daily. 2/3/2025 at  7:00 AM    busPIRone HCl (BUSPAR) 30 MG tablet Take 30 mg by mouth 2 times daily  2/4/2025 at  5:00 AM    celecoxib (CELEBREX) 100 MG capsule TAKE 1 CAPSULE (100 MG) BY MOUTH 2 TIMES DAILY. AS NEEDED FOR JOINT PAIN 1/30/2025    cloNIDine (CATAPRES) 0.1 MG tablet Take 1 tablet by mouth daily as needed (anxiety, panic attack). 1/28/2025    cyclobenzaprine (FLEXERIL) 10 MG tablet Take 1 tablet (10 mg) by mouth at bedtime. 2/3/2025 at  8:00 PM    DULoxetine  (CYMBALTA) 60 MG capsule Take 120 mg by mouth At Bedtime  2/3/2025 at  8:00 PM    hydroxychloroquine (PLAQUENIL) 200 MG tablet Take 1 tablet (200 mg) by mouth 2 times daily 2/4/2025 at  5:00 AM    montelukast (SINGULAIR) 10 MG tablet Take 1 tablet by mouth daily. Past Week    omeprazole (PRILOSEC) 40 MG DR capsule Take 1 capsule (40 mg) by mouth daily. Taking    ondansetron (ZOFRAN ODT) 4 MG ODT tab Take 1 tablet (4 mg) by mouth every 8 hours as needed for nausea. Dissolve ON the tongue. Taking As Needed    oxyCODONE (ROXICODONE) 5 MG tablet Take 1-2 tablets (5-10 mg) by mouth every 4 hours as needed for moderate to severe pain. Taking As Needed    oxyCODONE (ROXICODONE) 5 MG tablet Take 1 tablet (5 mg) by mouth See Admin Instructions. May take one tablet 2-3 times daily prn max of 75 tabs in 30 days. 2/3/2025 at  8:00 AM    polyethylene glycol (MIRALAX) 17 g packet Take 17 g by mouth daily. Taking    polyethylene glycol (MIRALAX) 17 GM/Dose powder Take 17 g by mouth daily (Patient taking differently: Take 17 g by mouth daily as needed for constipation.) Past Month    pregabalin (LYRICA) 150 MG capsule Take 1 capsule by mouth in the morning and around 1 PM 2/3/2025    rOPINIRole (REQUIP) 0.5 MG tablet Take 1 tablet (0.5 mg) by mouth at bedtime. 2/3/2025 at  8:00 PM    senna-docusate (SENOKOT-S/PERICOLACE) 8.6-50 MG tablet Take 1-2 tablets by mouth 2 times daily. Take while on oral narcotics to prevent or treat constipation. Taking    SENNA-docusate sodium (SENNA S) 8.6-50 MG tablet Take 1 tablet by mouth at bedtime (Patient taking differently: Take 1 tablet by mouth daily as needed (constipation).) Past Month    TRELEGY ELLIPTA 100-62.5-25 MCG/ACT oral inhaler Inhale 1 puff into the lungs daily. 2/3/2025 at  8:00 AM

## 2025-02-04 NOTE — ANESTHESIA CARE TRANSFER NOTE
Patient: Zayra Ramirez    Procedure: Procedure(s):  ARTHROPLASTY, SHOULDER, TOTAL       Diagnosis: Primary osteoarthritis of left shoulder [M19.012]  Diagnosis Additional Information: No value filed.    Anesthesia Type:   General     Note:    Oropharynx: oral airway in place  Level of Consciousness: drowsy  Oxygen Supplementation: face mask  Level of Supplemental Oxygen (L/min / FiO2): 6  Independent Airway: airway patency satisfactory and stable  Dentition: dentition unchanged  Vital Signs Stable: post-procedure vital signs reviewed and stable  Report to RN Given: handoff report given  Patient transferred to: PACU    Handoff Report: Identifed the Patient, Identified the Reponsible Provider, Reviewed the pertinent medical history, Discussed the surgical course, Reviewed Intra-OP anesthesia mangement and issues during anesthesia, Set expectations for post-procedure period and Allowed opportunity for questions and acknowledgement of understanding  Vitals:  Vitals Value Taken Time   /54 02/04/25 1217   Temp     Pulse 92 02/04/25 1221   Resp 19 02/04/25 1221   SpO2 93 % 02/04/25 1221   Vitals shown include unfiled device data.    Electronically Signed By: NICKO Shankar CRNA  February 4, 2025  12:21 PM

## 2025-02-04 NOTE — ANESTHESIA PREPROCEDURE EVALUATION
Anesthesia Pre-Procedure Evaluation    Patient: Zayra Ramirez   MRN: [id number] : 1970        Procedure : Procedure(s):  ARTHROPLASTY, SHOULDER, TOTAL  POSSIBLE REVERSE          Past Medical History:   Diagnosis Date    Allergic rhinitis     Anemia     Arthritis     Asthma     copd    Cervical pseudoarthrosis, sequela     Dental abscess 2015    Depressive disorder     Diaphragmatic hernia 2016    Gastroesophageal reflux disease     History of emphysema (H)     Hoarseness     Hypertension     Obstructive sleep apnea     Other chronic pain     Primary osteoarthritis of left shoulder     Respiratory bronchiolitis interstitial lung disease (H)     Sleep apnea     Smoker 2015      Past Surgical History:   Procedure Laterality Date    CERVICAL FUSION      COLONOSCOPY      COLONOSCOPY N/A 2020    Procedure: COLONOSCOPY, WITH POLYPECTOMY AND BIOPSY;  Surgeon: Julian Mccullough MD;  Location: [facility]    CYSTOSCOPY      ENT SURGERY      ESOPHAGOSCOPY, GASTROSCOPY, DUODENOSCOPY (EGD), COMBINED N/A 2020    Procedure: ESOPHAGOGASTRODUODENOSCOPY (EGD);  Surgeon: Julian Mccullough MD;  [facility]    EXCISE LESION INTRAORAL Bilateral 10/03/2018    Procedure: EXCISE LESION INTRAORAL;  Wide Local Excision Of of Left Oral Cavity Ulcer;  Surgeon: Morro Mijares MD;  Location: [facility]    GYN SURGERY      HC DRAIN SKIN ABSCESS SIMPLE/SINGLE  2012    Procedure:INCISION AND DRAINAGE, ABSCESS, SIMPLE; Surgeon:CHRISTIANO HANCOCK; Location: GI    HEAD & NECK SURGERY      HYSTERECTOMY      HYSTERECTOMY      INCISION AND DRAINAGE ABDOMEN WASHOUT, COMBINED      INJECT EPIDURAL CERVICAL N/A 10/14/2021    Procedure: Cervical 7- Thoracic 1 epidural steroid injection with fluoroscopy;  Surgeon: Tram Florian MD;  Location: [facility]    INJECT EPIDURAL LUMBAR Right 09/15/2020    Procedure: Lumbar5- sacral 1 epidural steroid injection with fluoroscopy;  Surgeon: Tram Florian MD;   [facility]    INJECT EPIDURAL LUMBAR Right 06/29/2021    Procedure: Lumbar 5 sacral 1 epidural steroid injection with fluoroscopy;  Surgeon: Tram Florian MD;  Location: [facility]    INJECT SACROILIAC JOINT Bilateral 06/16/2020    Procedure: Bilateral sacroiliac joint steroid injection with fluoroscopy;  Surgeon: Tram Florian MD;  [facility]    LAMINECTOMY THORACIC ONE LEVEL N/A 08/19/2019    Procedure: LAMINECTOMY, SPINE, THORACIC, 11-12 and Part of Lumbar 1, DRAINAGE OF EPIDURAL ABCESS, Epidural Drain Placement X 2;  Surgeon: Yadiel Beal MD;  Location: [facility]    OPTICAL TRACKING SYSTEM FUSION POSTERIOR CERVICAL THREE + LEVELS N/A 2/12/2024    Procedure: Posterior instrumented spinal fusion cervical 2-5; laminectomies cervical 3-4; use of local autograft and crushed cancellous allograft.;  Surgeon: Philip Wilhelm MD;  Location: [facility]    OPTICAL TRACKING SYSTEM FUSION SPINE POSTERIOR LUMBAR THREE+ LEVELS N/A 11/30/2022    Procedure: Posterior Instrumented Spinal Fusion Thoracic 8 to Sacrum with Bilateral Pelvic Fixation; Transforaminal Lumbar Interbody Fusion with Erickson-Nixon Osteotomy Lumbar 1 to Sacral 1 (5 levels); Use of Infuse Bone Morphogenetic Protein Large Kit and Allograft;  Surgeon: Philip Wilhelm MD;  Location: [facility]    ORTHOPEDIC SURGERY      MN PERCUT IMPLNT NEUROELECT,EPIDURAL N/A 08/08/2019    Procedure: TRIAL, SPINAL CORD STIMULATOR WITH BOSTON SCIENTIFIC;  Surgeon: Sipple, Daniel Peter, DO;  Location: [facility];  Service: Pain    RADIO FREQUENCY ABLATION / DESTRUCTION OF SACROILOAC JOINT DORSAL PRIMARY RAMUS Bilateral 12/17/2019    Procedure: Bilateral lumbar radiofrequency ablation with fluoroscopy and intravenous sedation ( Lumbar 2,3,4,5 medial branch nerves for the bilateral lumbar3-4, 4-5 and 5-sacral1 joints.;  Surgeon: Tram Florian MD;  [facility]    RADIO FREQUENCY ABLATION / DESTRUCTION OF SACROILOAC JOINT DORSAL PRIMARY RAMUS  Right 11/17/2020    Procedure: Right lumbar medial branch nerve radiofrequency ablation right L2,3,4,5 nerves supplying the right L3-4, L4-5 and L5-S1 facet joints;  Surgeon: Tram Florian MD;  Location: [facility]    spinal cord stimulator  08/08/2019    spinal cord stimulator removal  08/13/2019      Allergies   Allergen Reactions    Bee Venom Anaphylaxis    Doxycycline Anaphylaxis     Patient thinks it may have been just nausea and vomiting, however unable to confirm  Other reaction(s): throat closes    Erythromycin      Other reaction(s): Vomiting      Hydrocodone-Acetaminophen Itching      Social History     Tobacco Use    Smoking status: Former     Current packs/day: 0.00     Average packs/day: 1.5 packs/day for 35.0 years (52.5 ttl pk-yrs)     Types: Cigarettes     Start date: 1/1/1996     Quit date: 11/14/2021     Years since quitting: 3.2    Smokeless tobacco: Former     Quit date: 11/14/2021   Substance Use Topics    Alcohol use: Never      Wt Readings from Last 1 Encounters:   02/04/25 84 kg (185 lb 3 oz)        Anesthesia Evaluation   Pt has had prior anesthetic. Type: General and MAC.    History of anesthetic complications   Slow to wake, ROBERT.    ROS/MED HX  ENT/Pulmonary: Comment: Uses 3L O2 with CPAP only at night    Uses albuterol daily (currently 1-3 daily). Frequency changes w/ seasons.    Followed by Jennifer Bonilla MD in [facility]monology [facility].       TMJ-Flexeril for grinding    (+) sleep apnea, uses CPAP,              tobacco use, Past use,    Moderate Persistent, asthma  Treatment: Inhaler daily, Inhaled steroids and Nebulizer prn,              (-) recent URI   Neurologic: Comment: RLS    (+)    peripheral neuropathy, - LEs, radiculopathy.                        (-) no seizures and no CVA   Cardiovascular:     (+)  hypertension- -   -  - -                                 Previous cardiac testing   Echo: Date: Results:    Stress Test:  Date: Results:    ECG Reviewed:  Date: 2021 Results:  SR  Cath:   Date: Results:   (-) taking anticoagulants/antiplatelets, BLANK and arrhythmias   METS/Exercise Tolerance: 4 - Raking leaves, gardening Comment: Able to walk 1/3 to 1/2 mile without exertional symptoms.   Continues physical therapy   Hematologic:  - neg hematologic  ROS  (-) history of blood clots and history of blood transfusion   Musculoskeletal: Comment:   S/p ACDF with plate fixation C3-C5 in 2017    S/p Laminectomies T11-L1 in 2019    Right shoulder pseudogout    Rheumatoid arthritis, followed by Dr. Saenz.    (+)  arthritis,             GI/Hepatic:     (+) GERD, Asymptomatic on medication,               (-) liver disease   Renal/Genitourinary:  - neg Renal ROS  (-) renal disease   Endo:     (+)               Obesity,    (-) Type II DM   Psychiatric/Substance Use: Comment: Borderline personality    PTSD      (+) psychiatric history anxiety, depression and bipolar       Infectious Disease:  - neg infectious disease ROS     Malignancy:  - neg malignancy ROS     Other:  - neg other ROS    (+)  , H/O Chronic Pain,         Physical Exam    Airway        Mallampati: II   TM distance: > 3 FB   Neck ROM: full   Mouth opening: > 3 cm    Respiratory Devices and Support         Dental       (+) Minor Abnormalities - some fillings, tiny chips      Cardiovascular          Rhythm and rate: regular and normal     Pulmonary           (+) decreased breath sounds           OUTSIDE LABS:  CBC:   Lab Results   Component Value Date    WBC 5.6 01/07/2025    WBC 6.5 09/26/2024    HGB 13.3 01/07/2025    HGB 13.7 09/26/2024    HCT 40.8 01/07/2025    HCT 41.1 09/26/2024     01/07/2025     09/26/2024     BMP:   Lab Results   Component Value Date     01/07/2025     03/14/2024    POTASSIUM 4.8 01/07/2025    POTASSIUM 4.2 03/14/2024    CHLORIDE 101 01/07/2025    CHLORIDE 104 03/14/2024    CO2 29 01/07/2025    CO2 28 03/14/2024    BUN 21.0 (H) 01/07/2025    BUN 15.6 03/14/2024    CR 0.74 01/07/2025    CR 0.69  09/26/2024     (H) 02/04/2025    GLC 95 01/07/2025     COAGS:   Lab Results   Component Value Date    PTT 37 08/19/2019    INR 1.09 08/18/2019    FIBR 842 (H) 08/18/2019     POC:   Lab Results   Component Value Date    BGM 90 10/03/2018    HCG Negative 11/28/2010     HEPATIC:   Lab Results   Component Value Date    ALBUMIN 4.1 01/07/2025    PROTTOTAL 7.1 10/13/2023    ALT 15 01/07/2025    AST 21 01/07/2025    ALKPHOS 82 10/13/2023    BILITOTAL 0.3 10/13/2023     OTHER:   Lab Results   Component Value Date    PH 7.34 (L) 11/30/2022    LACT 1.9 11/30/2022    A1C 5.3 07/07/2022    NATALIYA 8.9 01/07/2025    PHOS 4.7 (H) 12/06/2022    MAG 2.2 12/06/2022    TSH 0.35 05/02/2024    CRP 11.1 (H) 07/28/2022    SED 14 09/26/2024       Anesthesia Plan    ASA Status:  3    NPO Status:  NPO Appropriate    Anesthesia Type: General.     - Airway: ETT   Induction: Intravenous.   Maintenance: Balanced.        Consents    Anesthesia Plan(s) and associated risks, benefits, and realistic alternatives discussed. Questions answered and patient/representative(s) expressed understanding.     - Discussed: Risks, Benefits and Alternatives for BOTH SEDATION and the PROCEDURE were discussed     - Discussed with:  Patient      - Extended Intubation/Ventilatory Support Discussed: Yes.      - Patient is DNR/DNI Status: No     Use of blood products discussed: Yes.     - Discussed with: Patient.     Postoperative Care    Pain management: Multi-modal analgesia, IV analgesics, Peripheral nerve block (Single Shot).   PONV prophylaxis: Dexamethasone or Solumedrol, Ondansetron (or other 5HT-3)     Comments:    Other Comments: Discussed risks of general anesthesia, including aspiration pneumonia, sore throat/hoarse voice, abrasions/damage to lips/tongue/teeth, nausea, rare complications (including medication reactions, cardiac, pulmonary, hypoxia/low oxygen, recall). Ensured understanding, invited questions and all questions were answered. Patient  "wishes to proceed.    Discussed preoperative interscalene block. Discussed risks of nerve block, including nerve injury, bleeding, infection, incomplete analgesia. Discussed anticipated areas of sensory and motor block, limb precautions, and fall precautions. Discussed alternative of not performing a nerve block. Ensured understanding, invited questions and all questions were answered. Patient wishes to proceed.             Alecia Blackwell MD    I have reviewed the pertinent notes and labs in the chart from the past 30 days and (re)examined the patient.  Any updates or changes from those notes are reflected in this note.    Clinically Significant Risk Factors Present on Admission                   # Hypertension: Noted on problem list           # Obesity: Estimated body mass index is 31.79 kg/m  as calculated from the following:    Height as of 1/7/25: 1.626 m (5' 4\").    Weight as of this encounter: 84 kg (185 lb 3 oz).       # Asthma: noted on problem list          "

## 2025-02-04 NOTE — ANESTHESIA PROCEDURE NOTES
Brachial plexus Procedure Note    Pre-Procedure   Staff -        Anesthesiologist:  Nilesh Marrero MD       Resident/Fellow: Alecia Blackwell MD       Performed By: resident and with residents       Procedure performed by resident/fellow/CRNA in presence of a teaching physician.         Location: pre-op       Procedure Start/Stop Times: 2/4/2025 7:54 AM and 2/4/2025 8:03 AM       Pre-Anesthestic Checklist: patient identified, IV checked, site marked, risks and benefits discussed, informed consent, monitors and equipment checked, pre-op evaluation, at physician/surgeon's request and post-op pain management  Timeout:       Correct Patient: Yes        Correct Procedure: Yes        Correct Site: Yes        Correct Position: Yes        Correct Laterality: Yes        Site Marked: Yes  Procedure Documentation  Procedure: Brachial plexus         Diagnosis: POST OPERATIVE ANALGESIA       Laterality: left       Patient Position: sitting       Skin prep: Chloraprep (interscalene approach).       Needle Type: short bevel       Needle Gauge: 22.        Needle Length (millimeters): 50        Ultrasound guided       1. Ultrasound was used to identify targeted nerve, plexus, vascular marker, or fascial plane and place a needle adjacent to it in real-time.       2. Ultrasound was used to visualize the spread of anesthetic in close proximity to the above referenced structure.       3. A permanent image is entered into the patient's record.    Assessment/Narrative         The placement was negative for: blood aspirated, painful injection and site bleeding       Paresthesias: No.       Bolus given via needle. no blood aspirated via catheter.        Secured via.        Insertion/Infusion Method: Single Shot       Complications: none       Injection made incrementally with aspirations every 3 mL.    Medication(s) Administered   Bupivacaine 0.5% w/ 1:200K Epi (Other) - Other   10 mL - 2/4/2025 8:00:00 AM  Bupivacaine liposome (Exparel)  "1.3% LA inj susp (Infiltration) - Infiltration   10 mL - 2/4/2025 8:00:00 AM  Medication Administration Time: 2/4/2025 7:54 AM     Comments:  Informed consent was obtained. Discussed risks of nerve block, including nerve injury, bleeding, infection. Discussed potential block side effects, including phrenic nerve palsy, laryngeal nerve palsy, Jamin's syndrome. Discussed anticipated incomplete analgesia. Discussed alternative of not performing a nerve block. Ensured understanding, invited questions and all questions were answered. Patient wishes to proceed.    Patient tolerated well. Incremental aspiration every 3 mL. No paresthesia, no heme. Needle tip visualized throughout with appropriate spread of local anesthetic. Ultrasound snapshots of block are documented in patient's chart.  Block was placed at the surgeon's request for post operative pain control.          FOR [facility] ([facility] ONLY:   Pain Team Contact information: please page the Pain Team Via [employer]. Search \"Pain\". During daytime hours, please page the attending first. At night please page the resident first.      "

## 2025-02-04 NOTE — ANESTHESIA POSTPROCEDURE EVALUATION
Patient: Zayra Ramirez    Procedure: Procedure(s):  ARTHROPLASTY, SHOULDER, TOTAL       Anesthesia Type:  General    Note:  Disposition: Admission   Postop Pain Control: Uneventful            Sign Out: Well controlled pain   PONV: No   Neuro/Psych: Uneventful            Sign Out: Acceptable/Baseline neuro status   Airway/Respiratory: Uneventful            Sign Out: Acceptable/Baseline resp. status   CV/Hemodynamics: Uneventful            Sign Out: Acceptable CV status; No obvious hypovolemia; No obvious fluid overload   Other NRE: NONE   DID A NON-ROUTINE EVENT OCCUR? No           Last vitals:  Vitals Value Taken Time   /70 02/04/25 1245   Temp 37.5  C (99.5  F) 02/04/25 1217   Pulse 102 02/04/25 1248   Resp 21 02/04/25 1248   SpO2 91 % 02/04/25 1248   Vitals shown include unfiled device data.    Electronically Signed By: Nilesh Marrero MD  February 4, 2025  12:49 PM

## 2025-02-04 NOTE — ANESTHESIA PROCEDURE NOTES
Airway       Patient location during procedure: OR       Procedure Start/Stop Times: 2/4/2025 8:39 AM  Staff -        CRNA: Thea Curran APRN CRNA       Performed By: CRNA  Consent for Airway        Urgency: elective  Indications and Patient Condition       Indications for airway management: estela-procedural       Induction type:intravenous       Mask difficulty assessment: 2 - vent by mask + OA or adjuvant +/- NMBA    Final Airway Details       Final airway type: endotracheal airway       Successful airway: ETT - single  Endotracheal Airway Details        ETT size (mm): 7.0       Cuffed: yes       Successful intubation technique: video laryngoscopy       VL Blade Size: Glidescope 3       Grade View of Cords: 1       Adjucts: stylet       Position: Right       Measured from: lips       Secured at (cm): 22       Bite block used: None    Post intubation assessment        Placement verified by: capnometry, equal breath sounds and chest rise        Number of attempts at approach: 1       Number of other approaches attempted: 0       Secured with: tape       Ease of procedure: easy       Dentition: Intact and Unchanged    Medication(s) Administered   Medication Administration Time: 2/4/2025 8:39 AM

## 2025-02-04 NOTE — OR NURSING
PACU to Inpatient Nursing Handoff    Patient Zayra Ramirez is a 55 year old female who speaks English.   Procedure Procedure(s):  ARTHROPLASTY, SHOULDER, TOTAL   Surgeon(s) Primary: Nataliia Dixon MD  Resident - Assisting: Lino Martel MD     Allergies   Allergen Reactions    Bee Venom Anaphylaxis    Doxycycline Anaphylaxis     Patient thinks it may have been just nausea and vomiting, however unable to confirm  Other reaction(s): throat closes    Erythromycin      Other reaction(s): Vomiting      Hydrocodone-Acetaminophen Itching       Isolation  No active isolations     Past Medical History   has a past medical history of Allergic rhinitis, Anemia, Arthritis, Asthma, Cervical pseudoarthrosis, sequela, Dental abscess (08/2015), Depressive disorder, Diaphragmatic hernia (07/11/2016), Gastroesophageal reflux disease, History of emphysema (H), Hoarseness, Hypertension, Obstructive sleep apnea, Other chronic pain, Primary osteoarthritis of left shoulder, Respiratory bronchiolitis interstitial lung disease (H), Sleep apnea, and Smoker (11/02/2015).    Anesthesia General with Block   Dermatome Level     Preop Meds TXA - time given: 0710   Nerve block Brachial Plexus .  Location:left. Med:bupivacaine, epinephrine, and Exparel (liposomal bupivacaine). Time given: 0800   Intraop Meds albuterol (Proventil, Ventolin)  dexamethasone (Decadron)  fentanyl (Sublimaze): 100 mcg total  hydromorphone (Dilaudid): 0.5 mg total  ketamine (Ketalar): 50 mg given  ketorolac (Toradol): last given at 15  ondansetron (Zofran): last given at 0906   Local Meds No   Antibiotics cefazolin (Ancef) - last given at 0857     Pain Patient Currently in Pain: denies   PACU meds  Not applicable   PCA / epidural No   Capnography     Telemetry ECG Rhythm: Normal sinus rhythm   Inpatient Telemetry Monitor Ordered? No        Labs Glucose Lab Results   Component Value Date     02/04/2025     12/01/2022    GLC 94 04/08/2021       Hgb Lab  Results   Component Value Date    HGB 13.3 01/07/2025    HGB 11.9 07/10/2021       INR Lab Results   Component Value Date    INR 1.09 08/18/2019      PACU Imaging Completed     Wound/Incision Incision/Surgical Site 11/30/22 Back (Active)   Number of days: 797       Incision/Surgical Site 02/12/24 Midline;Posterior Cervical spine (Active)   Number of days: 358       Incision/Surgical Site 02/04/25 Left Shoulder (Active)   Incision Assessment UTV 02/04/25 1315   Closure Sutures;Liquid bandage 02/04/25 1150   Incision Drainage Amount UTV 02/04/25 1315   Dressing Intervention Clean, dry, intact 02/04/25 1315   Number of days: 0      CMS        Equipment ice pack   Other LDA       IV Access Peripheral IV 02/04/25 Right Hand (Active)   Site Assessment WDL 02/04/25 1220   Line Status Infusing 02/04/25 1220   Dressing Transparent 02/04/25 1220   Dressing Status clean;dry;intact 02/04/25 1220   Phlebitis Scale 0-->no symptoms 02/04/25 1220   Number of days: 0      Blood Products Albumin  mL   Intake/Output Date 02/04/25 0700 - 02/05/25 0659   Shift 5364-8638 7134-8576 4709-3892 24 Hour Total   INTAKE   P.O. 240   240   I.V. 1400   1400   Colloid 250   250   Shift Total(mL/kg) 1890(22.5)   1890(22.5)   OUTPUT   Blood 200   200   Shift Total(mL/kg) 200(2.38)   200(2.38)   Weight (kg) 84 84 84 84      Drains / Hicks     Time of void PreOp Time of Void Prior to Procedure: 0736 (02/04/25 0735)    PostOp      Diapered? No   Bladder Scan Bladder Scan Volume (mL): 102 ml (02/04/25 1330)    mL (02/04/25 1330)  Apple juice and pudding     Vitals    B/P: (!) 128/94  T: 98.1  F (36.7  C)    Temp src: Oral  P:  Pulse: 97 (02/04/25 1315)          R: 18  O2:  SpO2: (!) 91 %    O2 Device: Nasal cannula (02/04/25 1330)    Oxygen Delivery: 3 LPM (02/04/25 1330)         Family/support present significant other and aunt   Patient belongings     Patient transported on bed   DC meds/scripts (obs/outpt) Yes, meds   Inpatient Pain Meds  Released? Yes       Special needs/considerations Uses a CPAP at night with 3 liters of oxygen. She normally runs low 90's oxygen sats   Tasks needing completion None       GREY Stacy

## 2025-02-04 NOTE — BRIEF OP NOTE
[facility]    Brief Operative Note    Pre-operative diagnosis: Primary osteoarthritis of left shoulder [M19.012]  Post-operative diagnosis Same as pre-operative diagnosis    Procedure: ARTHROPLASTY, SHOULDER, TOTAL, Left - Shoulder    Surgeon: Surgeons and Role:     * Nataliia Dixon MD - Primary     * Lino Martel MD - Resident - Assisting  Anesthesia: General with Block   Estimated Blood Loss: 150cc    Drains: None  Findings:   See full dictated operative report .  Complications: None apparent .  Implants:   Implant Name Type Inv. Item Serial No.  Lot No. LRB No. Used Action   IMP BONE CEMENT STRK SIMPLEX TOBRAMYCIN 40 6197-9-001 - [id number] Cement, Bone IMP BONE CEMENT STRK SIMPLEX TOBRAMYCIN 40 6197-9-001  [employer]  Left 1 Implanted   GLENOID CORTILOC MD 30MM - [id number] Total Joint Component/Insert GLENOID CORTILOC MD 30MM [id number] [employer]  Left 1 Implanted   HEAD HUM 18MM 44MM SHLDR SIMPLICITI STRL  [id number] - [id number] Total Joint Component/Insert HEAD HUM 18MM 44MM SHLDR SIMPLICITI STRL  [id number] [employer]  Left 1 Implanted   [place] Perform Anatomic Glenoid Cortiloc Pegged Glenoid Size 1   [id number] [employer]  Left 1 Implanted     Same day surgery  Pain management: Pre-op block, tylenol, celecoxib, gabapentin, narcotic for breakthrough, ice  Nausea: Discharge with Zofran for post-op nausea  Activity: up ad aminta, no left shoulder ROM   Weight bearing status: NWB LUE  Sling: Ultrasling at all times except hygiene   DVT prophylaxis: ASA 81mg BID for 4 weeks starting POD1 at 0800  Wound Care: Dry dressings x 72 hours post-op, ok to shower but no soaking or aggressive scrubbing  Disposition: Home POD1 pending progress with therapy, tolerating diet, medically stabled     Future Appointments 2/4/2025 - 8/3/2025        Date Visit Type Length Department Provider     2/5/2025  9:30 AM IP OT EVALUATION 60 min  [facility] OT Crys Hong OT    Location Instructions:     This is an inpatient department located at [facility].&nbsp; The [facility] is located in the [facility] [place]. lt is easily accessible from virtually any point in the [facility] area, via [address]              2/11/2025 10:50 AM EXTREMITY EVALUATION 40 min [facility] Willem Gustafson, PT    Location Instructions:     How to find our clinic: Enter main entrance, elevator is on left, take elevator to 3rd floor, clinic is straight ahead, door is to the left.    Parking:&nbsp; Free parking available in surface lot or in ramp.              2/18/2025  2:00 PM EXTREMITY TREATMENT  40 min [facility] Willem Gustafson, MARA    Location Instructions:     How to find our clinic: Enter main entrance, elevator is on left, take elevator to 3rd floor, clinic is straight ahead, door is to the left.    Parking:&nbsp; Free parking available in surface lot or in ramp.              2/19/2025  8:40 AM POST OP MSK 20 min [facility] Nataliia Dixon MD    Location Instructions:     The [facility] ([facility]) is in a dense urban area with multiple transportation and parking options. You may wish to review options for  service and self-parking in more detail on the [facility] website at [email or web address].&nbsp;                2/21/2025  7:40 AM CT CERVICAL SPINE WO 20 min [facility] CT [email or web address]    Location Instructions:     The [facility] ([facility]) is in a dense urban area with multiple transportation and parking options. You may wish to review options for  service and self-parking in more detail on the [facility] s website at [email or web address].               2/21/2025  8:30 AM RETURN SURGICAL SPINE 30 min [facility] ORTHOPED[facility] Bishop NIURKA Kiran PA-C    Location Instructions:     The [facility] ([facility]) is in a dense urban area with multiple  transportation and parking options. You may wish to review options for  service and self-parking in more detail on the [facility] website at [email or web address].&nbsp;                2/25/2025  8:00 AM RETURN MED WT MGMT NUTRITION 30 min [facility] Evelia Pope RD    Location Instructions:     Our clinic is located in the [facility] located at [address], [address], [place], [place], across the street from [facility].              2/25/2025 10:50 AM EXTREMITY TREATMENT  40 min [facility] PHYS THERAP[facility] Willem Gustafson, MARA    Location Instructions:     How to find our clinic: Enter main entrance, elevator is on left, take elevator to 3rd floor, clinic is straight ahead, door is to the left.    Parking:&nbsp; Free parking available in surface lot or in ramp.              4/10/2025 10:00 AM RETURN RHEUMATOLOGY 30 min [facility] RHEUMATOLOGY SaenzPanchito lai MD    Location Instructions:     Your appointment is at [facility] located at [address] [place] [address] [address] [address]. Please check in at the  in [address].                       Lino Martel MD  Orthopaedic Surgery Resident  [facility]  Pager: [phone]  02/04/2025

## 2025-02-05 VITALS
OXYGEN SATURATION: 95 % | HEART RATE: 61 BPM | SYSTOLIC BLOOD PRESSURE: 131 MMHG | RESPIRATION RATE: 16 BRPM | WEIGHT: 185.19 LBS | TEMPERATURE: 99.1 F | DIASTOLIC BLOOD PRESSURE: 86 MMHG | BODY MASS INDEX: 31.79 KG/M2

## 2025-02-05 LAB
GLUCOSE BLDC GLUCOMTR-MCNC: 131 MG/DL (ref 70–99)
HGB BLD-MCNC: 12.8 G/DL (ref 11.7–15.7)

## 2025-02-05 PROCEDURE — 250N000013 HC RX MED GY IP 250 OP 250 PS 637: Performed by: PHYSICIAN ASSISTANT

## 2025-02-05 PROCEDURE — 250N000011 HC RX IP 250 OP 636: Mod: JZ

## 2025-02-05 PROCEDURE — 85018 HEMOGLOBIN: CPT

## 2025-02-05 PROCEDURE — 36415 COLL VENOUS BLD VENIPUNCTURE: CPT

## 2025-02-05 PROCEDURE — 97110 THERAPEUTIC EXERCISES: CPT | Mod: GO

## 2025-02-05 PROCEDURE — 82962 GLUCOSE BLOOD TEST: CPT

## 2025-02-05 PROCEDURE — 99214 OFFICE O/P EST MOD 30 MIN: CPT | Performed by: INTERNAL MEDICINE

## 2025-02-05 PROCEDURE — 97165 OT EVAL LOW COMPLEX 30 MIN: CPT | Mod: GO

## 2025-02-05 PROCEDURE — 97535 SELF CARE MNGMENT TRAINING: CPT | Mod: GO

## 2025-02-05 PROCEDURE — 250N000013 HC RX MED GY IP 250 OP 250 PS 637

## 2025-02-05 RX ADMIN — CEFAZOLIN SODIUM 2 G: 2 INJECTION, SOLUTION INTRAVENOUS at 00:54

## 2025-02-05 RX ADMIN — BUSPIRONE HYDROCHLORIDE 30 MG: 15 TABLET ORAL at 08:04

## 2025-02-05 RX ADMIN — HYDROXYCHLOROQUINE SULFATE 200 MG: 200 TABLET ORAL at 08:05

## 2025-02-05 RX ADMIN — POLYETHYLENE GLYCOL 3350 17 G: 17 POWDER, FOR SOLUTION ORAL at 08:05

## 2025-02-05 RX ADMIN — OXYCODONE HYDROCHLORIDE 10 MG: 10 TABLET ORAL at 13:30

## 2025-02-05 RX ADMIN — PREGABALIN 150 MG: 75 CAPSULE ORAL at 13:30

## 2025-02-05 RX ADMIN — ACETAMINOPHEN 975 MG: 325 TABLET, FILM COATED ORAL at 06:03

## 2025-02-05 RX ADMIN — MONTELUKAST 10 MG: 10 TABLET, FILM COATED ORAL at 08:05

## 2025-02-05 RX ADMIN — ARIPIPRAZOLE 5 MG: 5 TABLET ORAL at 08:05

## 2025-02-05 RX ADMIN — PREGABALIN 150 MG: 75 CAPSULE ORAL at 08:04

## 2025-02-05 RX ADMIN — SENNOSIDES AND DOCUSATE SODIUM 1 TABLET: 50; 8.6 TABLET ORAL at 08:05

## 2025-02-05 RX ADMIN — AZELASTINE 2 SPRAY: 1 SPRAY, METERED NASAL at 08:05

## 2025-02-05 RX ADMIN — KETOROLAC TROMETHAMINE 15 MG: 15 INJECTION, SOLUTION INTRAMUSCULAR; INTRAVENOUS at 06:03

## 2025-02-05 RX ADMIN — PANTOPRAZOLE SODIUM 40 MG: 40 TABLET, DELAYED RELEASE ORAL at 06:44

## 2025-02-05 RX ADMIN — OXYCODONE HYDROCHLORIDE 10 MG: 10 TABLET ORAL at 09:17

## 2025-02-05 RX ADMIN — HYDROXYCHLOROQUINE SULFATE 200 MG: 200 TABLET ORAL at 12:29

## 2025-02-05 RX ADMIN — OXYCODONE HYDROCHLORIDE 10 MG: 10 TABLET ORAL at 05:16

## 2025-02-05 RX ADMIN — ASPIRIN 81 MG: 81 TABLET ORAL at 08:05

## 2025-02-05 ASSESSMENT — ACTIVITIES OF DAILY LIVING (ADL)
ADLS_ACUITY_SCORE: 34
ADLS_ACUITY_SCORE: 35
ADLS_ACUITY_SCORE: 34
ADLS_ACUITY_SCORE: 35
ADLS_ACUITY_SCORE: 34
ADLS_ACUITY_SCORE: 35
ADLS_ACUITY_SCORE: 34
ADLS_ACUITY_SCORE: 35

## 2025-02-05 NOTE — DISCHARGE SUMMARY
ORTHOPAEDIC SURGERY DISCHARGE SUMMARY     Date of Admission: 2/4/2025  Date of Discharge: 2/5/2025  2:00 PM  Disposition: Home  Staff Physician: No att. providers found  Primary Care Provider: Neena Tam    DISCHARGE DIAGNOSIS:  Primary osteoarthritis of left shoulder [M19.012]    PROCEDURES: Procedure(s):  Left total shoulder arthroplasty on 2/4/2025    BRIEF HISTORY:  This patient is known to Dr. Dixon from clinic where they have discussed the radiographic and physical exam findings. The patient's shoulder has been causing lifestyle limiting pain and she feels that nonoperative management  have not adequately relieved these symptoms. Therefore, the patient wished to consider surgical options. They discussed total shoulder arthroplasty including the risks and benefits and perioperative rehabilitation plan. Risks discussed include periprosthetic fracture, infection, dislocation, pain, decreased range of motion, neurovascular injury, need for revision surgery, medical and anesthesia related complications. The patient had the opportunity for questions to be answered and wished to proceed with surgery. Informed consent was completed.     HOSPITAL COURSE:    The patient was admitted following the above listed procedures for pain control and rehabilitation. Zayra Ramirez did well post-operatively. Medicine was consulted post operatively to aid in management of medical co-morbidities. The patient received routine nursing cares and at the time of discharge was medically stable. Vital signs were stable throughout admission. The patient is tolerating a regular diet and is voiding spontaneously. All PT/OT goals have been met for safe mobility. Pain is now controlled on oral medications which will be available on discharge. Stool softeners have been used while taking pain medications to help prevent constipation. Zayra Ramirez is deemed medically safe to discharge.     Antibiotics:  Ancef given periop and 24 hours  postop.   DVT prophylaxis:  Aspirin 81mg BID initiated after surgery and will be continued for 4 weeks.   PT Progress:  Has met PT/OT goals for safe mobility.    Pain Meds:  Weaned off all IV pain meds by discharge.  Inpatient Events: No significant events or complications.     PHYSICAL EXAM:    See day of discharge progress note    FOLLOWUP:    Follow up with Dr. Dixon at 1 week postoperatively.    Future Appointments   Date Time Provider Department Center   2/11/2025 10:50 AM Willem Gustafson, PT [facility]   2/18/2025  2:00 PM Willem Gustafson, PT [facility]   2/19/2025  8:40 AM Nataliia Dixon MD [facility]   2/21/2025  7:40 AM [facility]   2/21/2025  8:30 AM Bishop NIURKA Kiran PA-C [facility]   2/25/2025  8:00 AM Evelia Pope RD [facility]   2/25/2025 10:50 AM Willem Gustafson, PT [facility]   4/10/2025 10:00 AM Panchito Saenz MD [facility]       Orthopaedic Surgery appointments are at the [facility] ([address] [place] [address]). Call [phone]  to schedule a follow-up appointment at this location with your provider.     PLANNED DISCHARGE ORDERS:      Discharge Medication List as of 2/5/2025 11:21 AM        START taking these medications    Details   !! acetaminophen (TYLENOL) 325 MG tablet Take 2 tablets (650 mg) by mouth every 4 hours as needed for other (mild pain)., Disp-100 tablet, R-0, E-Prescribe      aspirin 81 MG EC tablet Take 1 tablet (81 mg) by mouth 2 times daily., Disp-60 tablet, R-0, E-Prescribe      ondansetron (ZOFRAN ODT) 4 MG ODT tab Take 1 tablet (4 mg) by mouth every 8 hours as needed for nausea. Dissolve ON the tongue., Disp-10 tablet, R-0, E-Prescribe      !! polyethylene glycol (MIRALAX) 17 g packet Take 17 g by mouth daily., Disp-7 packet, R-0, E-Prescribe       !! - Potential duplicate medications found. Please discuss with provider.        CONTINUE these medications which have CHANGED    Details    omeprazole (PRILOSEC) 40 MG DR capsule Take 1 capsule (40 mg) by mouth daily., Disp-90 capsule, R-3, E-Prescribe      oxyCODONE (ROXICODONE) 5 MG tablet Take 1-2 tablets (5-10 mg) by mouth every 4 hours as needed for moderate to severe pain., Disp-10 tablet, R-0, E-Prescribe      senna-docusate (SENOKOT-S/PERICOLACE) 8.6-50 MG tablet Take 1-2 tablets by mouth 2 times daily. Take while on oral narcotics to prevent or treat constipation., Disp-30 tablet, R-0, E-PrescribeWhile taking narcotics           CONTINUE these medications which have NOT CHANGED    Details   !! acetaminophen (TYLENOL) 500 MG tablet Take 1-2 tablets (500-1,000 mg) by mouth every 6 hours as needed for mild pain, Disp-100 tablet, R-1, E-Prescribe      albuterol (PROAIR HFA/PROVENTIL HFA/VENTOLIN HFA) 108 (90 Base) MCG/ACT inhaler Inhale 2 puffs into the lungs every 6 hours as needed for shortness of breath / dyspnea or wheezing Ventolin please, Disp-18 g, R-2, E-Prescribe      ARIPiprazole (ABILIFY) 5 MG tablet Take 5 mg by mouth every morning, Historical      azelastine (ASTELIN) 0.1 % nasal spray Spray 2 sprays into both nostrils 2 times daily., Historical      busPIRone HCl (BUSPAR) 30 MG tablet Take 30 mg by mouth 2 times daily , Historical      celecoxib (CELEBREX) 100 MG capsule TAKE 1 CAPSULE (100 MG) BY MOUTH 2 TIMES DAILY. AS NEEDED FOR JOINT PAIN, Disp-60 capsule, R-2, E-Prescribe      cloNIDine (CATAPRES) 0.1 MG tablet Take 1 tablet by mouth daily as needed (anxiety, panic attack)., Historical      cyclobenzaprine (FLEXERIL) 10 MG tablet Take 1 tablet (10 mg) by mouth at bedtime., Disp-90 tablet, R-1, E-Prescribe      DULoxetine (CYMBALTA) 60 MG capsule Take 120 mg by mouth At Bedtime , Historical      hydroxychloroquine (PLAQUENIL) 200 MG tablet Take 1 tablet (200 mg) by mouth 2 times daily, Disp-180 tablet, R-3, E-Prescribe      montelukast (SINGULAIR) 10 MG tablet Take 1 tablet by mouth daily., Historical      !! polyethylene glycol  (MIRALAX) 17 GM/Dose powder Take 17 g by mouth daily, OTC      pregabalin (LYRICA) 150 MG capsule Take 1 capsule by mouth in the morning and around 1 PM, Disp-60 capsule, R-5, E-Prescribe      rOPINIRole (REQUIP) 0.5 MG tablet Take 1 tablet (0.5 mg) by mouth at bedtime., Disp-90 tablet, R-1, E-Prescribe      TRELEGY ELLIPTA 100-62.5-25 MCG/ACT oral inhaler Inhale 1 puff into the lungs daily., SIDDHARTHA, Historical      albuterol (PROVENTIL) (2.5 MG/3ML) 0.083% neb solution INHALE 3 ML VIA A NEBULIZER 4 TIMES DAILY IF NEEDED., Historical      EPINEPHrine (ANY BX GENERIC EQUIV) 0.3 MG/0.3ML injection 2-pack Inject 0.3 mLs (0.3 mg) into the muscle as needed for anaphylaxis May repeat one time in 5-15 minutes if response to initial dose is inadequate., Disp-2 each, R-1, E-Prescribe      insulin pen needle (32G X 4 MM) 32G X 4 MM miscellaneous Use 1 NEEDLE WEEKLYDisp-12 each, [id number]      naloxone (NARCAN) 4 MG/0.1ML nasal spray Spray 1 spray (4 mg) into one nostril alternating nostrils as needed for opioid reversal every 2-3 minutes until assistance arrives, Disp-0.2 mL, R-0, E-Prescribe      OXYGEN-HELIUM IN 3L @ night    Oxygen only not helium, Historical      Respiratory Therapy Supplies (CARETOUCH UNIVERSL CPAP FILTER) MISC Historical       !! - Potential duplicate medications found. Please discuss with provider.            Discharge Procedure Orders   Reason for your hospital stay   Order Comments: PRIMARY TOTAL SHOULDER ARTHROPLASTY (TSA)    POST OP INSTRUCTIONS  Nataliia Dixon MD  [phone]   of Orthopedic Surgery  [facility], [facility]      ABOUT YOUR SURGERY  With this surgery, we replaced your arthritic shoulder joint with metal and plastic. With time, this will heal and provide improved pain relief, function, and mobility about your shoulder.     POST OP OFFICE VISIT  You should have an office visit scheduled with Dr. Dixon or her Physician Assistant,  within 7-10 days after your surgery. If you do not have this appointment please call [phone] to set up an appointment.      MEDICATIONS  You will receive prescriptions for pain medication (and other applicable medications) on the day of your surgery if you have not already had it arranged to  from the office or sent to your pharmacy ahead of time.   The goal of the multimodal pain regimen is to minimize the amount of narcotic (opioid) medications that you require for postoperative pain control - if possible, try and take the non-narcotic pain medications instead of the narcotic medication unless you feel you need it. You should discuss these medications with your primary care doctor to make sure there are no interactions with any other medications that you are taking, and that your kidneys and/or liver are healthy enough for their use.  Unless otherwise specified, you will be prescribed Hydrocodone-Acetaminophen  mg (Norco; #20 total) for pain. You may take 1 or 2 tablets every 4-6 hours as needed. These medicines are OPTIONAL, and are not necessary if your pain is tolerable.  You will also be prescribed Celecoxib 100 mg twice daily (Celebrex; #30 total) for pain as needed. This should be your option for pain control if the pain is not so bad as to require the narcotic medication(s).  You will also be prescribed Gabapentin 100 mg every 8 hours (Neurontin; #21 total) to be taken as scheduled (unless you are already on a similar medication such as Lyrica, or have an allergy). This medication affects chemicals and nerves in the body and is another component of the multi-modal pain regimen. Use of this medication will hopefully reduce comsumption of the narcotic pain medications. Its use can contribute to drowsiness, sleepiness, or dizziness in some patients.    Once you are off the Norco and Celebrex, or immediately after surgery, you may start taking Tylenol and Motrin. Unless you have a medical  condition that prevents the use of Tylenol or Motrin, you can take 400 mg of Motrin and 650 mg of Tylenol TOGETHER or in a staggered fashion every 6 hours as needed. Do NOT take Norco and Tylenol together (as Norco contains tylenol), and do not take Celebrex and Motrin together.  You will also receive a prescription for a nausea medicine called Ondansetron 4 mg ODT (Zofran; #10 total). You may take one tablet every 8 hours as needed for nausea.  You are strongly encouraged to take a stool softener and/or laxative while taking narcotic pain medicine. You will be prescribed Colace 100 mg (#30 total), a stool softener, which can be taken twice daily for constipation as needed.  Finally, you will receive a prescription for Prilosec 20 mg (Nexium; #14 total) which we recommend to take every morning in order to combat the occurrence of heartburn while on the medications above in the postoperative period.  If you are having pain beyond what is expected from surgery and you need to speak to someone from our office, please do not wait until after 4 PM on weekdays or over the weekend, as we will not be able to address it.  It is hospital policy that we do not refill narcotic pain medicine after hours or on weekends.  Please call [phone] for further instructions.    ANTICOAGULATION (BLOOD THINNERS)  You will receive a prescription for Aspirin 81 mg (#28 total) to be taken twice daily for prophylaxis against blood clots unless you have a contraindication to receiving this.  Ambulation, foot/hand pumps, and movement of the surgical site within the confines of the weightbearing and motion restrictions below are additional ways to reduce the occurrence of blood clots after surgery.    DRESSING CHANGES, WOUND CARE, AND BATHING  You will leave the operating room with a waterproof bandage over your part of your surgical incision. This should remain in place until you are seen for your initial postoperative visit in the clinic.  You may leave the operating room with additional cast padding and/or ace wrap material over the top of this or other smaller incisions, which is for local swelling control in the early postoperative period and can be removed at 24 hours after your surgery and replaced over the smaller incisions with waterproof band-aids. You may notice a small amount of clear or reddish drainage in the center of the waterproof bandage - this is normal. However, if you saturate through the waterproof bandage and it looses its seal over the surgical site, please contact our office.   Please keep your wounds clean and dry. You may shower with waterproof bandage over the wound, but you should not bathe or soak your surgical site. Please do not apply any lotions, creams, or ointments directly to the incisions until 30 days after surgery. After your sutures are removed at your first post op visit, you may get your incisions wet and they no longer need to be covered.   AVOID STEAM ROOMS, SWIMMING POOLS, AND TUBS FOR A FULL 4 WEEKS AFTER THE DATE OF YOUR SURGERY TO AVOID INFECTION.    SWELLING / INFLAMMATION CONTROL  Icing the surgical is very important following surgery. In most cases you will be offered a Polar Care unit to use after surgery.  This unit is a cooler which circulates cold water through a cuff. This will be provided to you by [employer] if you wish to obtain one.  This device may be left on the surgical site for extended periods of time. There is a rental fee to use this device, or it may be purchased directly. For any questions regarding this equipment please call [employer] directly at [phone].  If you choose to use regular ice packs, please limit icing to 20 minute sessions every 2-3 hours at most to avoid any skin problems.   Icing should be continued for the first several weeks following surgery.   In addition to icing, compression with a support/wrap and elevation of the affected limb above the level of your heart will  promote good circulation and reduce both swelling and pain.  It is normal to have swelling and/or bruising around your incisions, or about the surgical extremity after surgery. This will gradually resolve after surgery.  PROLONGED FEVER OVER 102 DEGREES, THICK DRAINAGE, CHEST PAIN, SHORTNESS OF BREATH, OR CALF PAIN SHOULD BE REPORTED IMMEDIATELY.  PLEASE CALL US [phone] IF YOU EXPERIENCE THESE SYMPTOMS.     WEIGHTBEARING STATUS / IMMOBILIZATION  You should not bear any weight with your surgical extremity while it is in the sling, and until you are instructed as part of the postoperative rehabilitation protocol.    REHABILITATION / PHYSICAL THERAPY  Initially postoperative, you must wear the sling at all times with the exception of removal for passive motion exercises. The priority following this surgery is allowing the bone to heal. In order to do this and minimize stiffness, Dr. Dixon recommends you begin home exercises with passive shoulder motion only (pendulums), elbow/wrist range of motion, and  strengthening until you are seen at your first postoperative appointment; however, there are some degrees of bony fixation for which Dr. Dixon will not want you doing any shoulder range of motion (you will be informed accordingly either before or after surgery).   You will wear your sling for 4-6 weeks total  In most cases, formal physical therapy will be started immediately after your date of surgery. The therapy can ultimately be done at any facility you like and clearance to begin PT will be provided when appropriate.    DRIVING AFTER SURGERY  The ability for someone to resume driving after surgery is seldom a medical question, but more often a legal question.  Driving with any form of a brace on may be interpreted as driving while impaired.  It is the responsibility of all licensed drivers to drive safely at all times no matter what their permanent or temporary impairment may be.      WORK AFTER  SURGERY  Discussions of return to work will depend on the type of work that you perform, and the immediacy of needs to return. This discussion will be had preoperatively and at each visit postoperatively in order to help you, the patient, to get back to what you need to be doing in a timely fashion, while not compromising your surgical outcome.     DIET AFTER SURGERY  A balanced, healthy diet high in proteins is most valuable for the body to utilize during the healing process after surgery. There are otherwise no formal restrictions on your diet after surgery.     SLEEPING TIPS  Unfortunately, it is often difficult to get quality sleep following shoulder surgery of any kind. Many patients do better in a recliner or propped up on pillows.  It may be best to take pain medicine shortly before bed, as these medicines typically have a sedating effect. If you still have to wear a sling, sometimes removing the pillow that is attached may help significantly. It is attached with velcro. Gunslinger braces should not be modified.    DENTAL WORK  YOU MUST NOT HAVE ANY DENTAL WORK FOR 6 MONTHS AFTER THE DATE OF YOUR SURGERY. For dental appointments after your surgery, you must contact our office to request a prescription for antibiotics prior to your dental appointment.     OTHER COMMENTS  We recommend to abstain from alcohol or tobacco use in the postoperative period. This is for general health reasons, as combination with the prescribed medications can be dangerous, but also to prevent adversely affecting the body's healing response to your surgery.      If you have questions and need to speak to the nurse, please send us a message via [employer], or contact us at our triage center at [phone]  If you have an emergency after hours or on the weekend call the physician on call at [phone] or [phone]     When to call - Contact Surgeon Team   Order Comments: You may experience symptoms that require follow-up before your scheduled  "appointment. Refer to the \"[employer]\" for instructions on when to contact your Surgeon Team if you are concerned about pain control, blood clots, constipation, or if you are unable to urinate.     When to call - Reach out to Urgent Care   Order Comments: If you are not able to reach your Surgeon Team and you need immediate care, go to the Orthopedic Walk-in Clinic or Urgent Care at your Surgeon's office.  Do NOT go to the Emergency Room unless you have shortness of breath, chest pain, or other signs of a medical emergency.     When to call - Reasons to Call 911   Order Comments: Call 911 immediately if you experience sudden-onset chest pain, arm weakness/numbness, slurred speech, or shortness of breath     Discharge Instruction - Breathing exercises   Order Comments: Perform breathing exercises 10 times per hours while awake for 2 weeks. (If given, use your Incentive Spirometer)     Symptoms - Fever Management   Order Comments: A low grade fever can be expected after surgery.  Use acetaminophen (TYLENOL) as needed for fever management.  Contact your Surgeon Team if you have a fever greater than 101.5 F, chills, and/or night sweats.     Symptoms - Constipation management   Order Comments: Constipation (hard, dry bowel movements) is expected after surgery due to the combination of being less active, the anesthetic, and the opioid pain medication.  You can do the following to help reduce constipation:  ~  FLUIDS:  Drink clear liquids (water or Gatorade), or juice (apple/prune).  ~  DIET:  Eat a fiber rich diet.    ~  ACTIVITY:  Get up and move around several times a day.  Increase your activity as you are able.  MEDICATIONS:  Reduce the risk of constipation by starting medications before you are constipated.  You can take Miralax   (1 packet as directed) and/or a stool softener (Senokot 1-2 tablets 1-2 times a day).  If you already have constipation and these medications are not working, you can get magnesium " citrate and use as directed.  If you continue to have constipation you can try an over the counter suppository or enema.  Call your Surgeon Team if it has been greater than 3 days since your last bowel movement.     Symptoms - Reduced Urine Output   Order Comments: Changes in the amount of fluids you drank before and after surgery may result in problems urinating.  It is important to stay well-hydrated after surgery and drink plenty of water. If it has been greater than 8 hours since you have urinated despite drinking plenty of water, call your Surgeon Team.     Activity - Exercises to prevent blood clots   Order Comments: Unless otherwise directed by your Surgeon team, perform the following exercises at least three times per day for the first four weeks after surgery to prevent blood clots in your legs: 1) Point and flex your feet (Ankle Pumps), 2) Move your ankle around in big circles, 3) Wiggle your toes, 4) Walk, even for short distances, several times a day, will help decrease the risk of blood clots.     Order Specific Question Answer Comments   Is discharge order? Yes      Comfort and Pain Management - Pain after Surgery   Order Comments: Pain after surgery is normal and expected.  You will have some amount of pain for several weeks after surgery.  Your pain will improve with time.  There are several things you can do to help reduce your pain including: rest, ice, elevation, and using pain medications as needed. Contact your Surgeon Team if you have pain that persists or worsens after surgery despite rest, ice, elevation, and taking your medication(s) as prescribed. Contact your Surgeon Team if you have new numbness, tingling, or weakness in your operative extremity.     Comfort and Pain Management - Swelling after Surgery   Order Comments: Swelling and/or bruising of the surgical extremity is common and may persist for several months after surgery. In addition to frequent icing and elevation, gentle  compressive support with an ACE wrap or tubigrip may help with swelling. Apply compression regularly, removing at least twice daily to perform skin checks. Contact your Surgeon Team if your swelling increases and is NOT associated with an increase in your activity level, or if your swelling increases and is associated with redness and pain.     Comfort and Pain Management - Cold therapy   Order Comments: Ice can be used to control swelling and discomfort after surgery. Place a thin towel over your operative site and apply the ice pack overtop. Leave ice pack in place for 20 minutes, then remove for 20 minutes. Repeat this 20 minutes on/20 minutes off routine as often as tolerated.     Medication Instructions - NSAID Instructions   Order Comments: You were discharged with an anti-inflammatory medication for pain management to use in combination with acetaminophen (TYLENOL) and the narcotic pain medication.  Take this medication exactly as directed.  You should only take one anti-inflammatory at a time.  Some common anti-inflammatories include: ibuprofen (ADVIL, MOTRIN), naproxen (ALEVE, NAPROSYN), celecoxib (CELEBREX), meloxicam (MOBIC), ketorolac (TORADOL).  Take this medication with food and water.     Follow Up Care   Order Comments: Follow-up with Dr. Dixon's team in 7-10 days for incision check. Call [phone] to schedule if not already scheduled.     Return to Driving   Order Comments: Return to driving - Driving is NOT permitted until directed by your provider. Under no circumstance are you permitted to drive while using narcotic pain medications.     Order Specific Question Answer Comments   Is discharge order? Yes      NO weight bearing   Order Comments: No weight bearing on your operative extremity.     Order Specific Question Answer Comments   Is discharge order? Yes      Dressing / Wound Care - Wound   Order Comments: You have a waterproof surgical dressing in place. Leave on until you're seen in clinic  for incision check in 7-10 days. If you notice it is leaking or there is drainage filling the dressing, call clinic for instructions on how to change dressing and for further instructions. You may reinforce dressing with saran wrap for showering. If dressing becomes wet underneath, it will need to be changed. Call clinic for instructions.     Dressing / Wound Care - NO Tub Bathing   Order Comments: Tub bathing, swimming, or any other activities that will cause your incision to be submerged in water should be avoided until allowed by your Surgeon.     Medication instructions -  Anticoagulation - aspirin   Order Comments: Take the aspirin as prescribed for a total of four weeks after surgery.  This is given to help minimize your risk of blood clot.     Comfort and Pain Management - UPPER extremity Elevation   Order Comments: Swelling is expected for several months after surgery. This type of swelling is usually associated with gravity and activity, and can be improved with elevation.     Opioid Instructions (Less than 65 years)   Order Comments: You were discharged with an opioid medication (hydromorphone, oxycodone, hydrocodone, or tramadol). This medication should only be taken for breakthrough pain that is not controlled with acetaminophen (TYLENOL). If you rate your pain less than 3 you do not need this medication. Pain rating 0-3: You do not need this medication. Pain rating 4-6: Take 1 tablet every 4-6 hours as needed Pain rating 7-10: Take 2 tablets every 4-6 hours as needed. Do not exceed 6 tablets per day     Medication Instructions - Opioids - Tapering Instructions   Order Comments: In the first three days following surgery, your symptoms may warrant use of the narcotic pain medication every four to six hours as prescribed. This is normal. As your pain symptoms improve, focus your efforts on decreasing (tapering) use of narcotic medications. The most successful tapering strategy is to first, decrease the  number of tablets you take every 4-6 hours to the minimum prescribed. Then, increase the amount of time between doses. For example: First, taper to   or 1 tablet every 4-6 hours. Then, taper to   or 1 tablet every 6-8 hours. Then, taper to   or 1 tablet every 8-10 hours. Then, taper to   or 1 tablet every 10-12 hours. Then, taper to   or 1 tablet at bedtime. The bedtime dose can help with comfort during sleep and is typically the last dose to be discontinued after surgery.     NO active or passive range of motion   Order Comments: NO active or passive range of motion     Order Specific Question Answer Comments   Is discharge order? Yes      Dressing / Wound care - Shower with wound/dressing covered   Order Comments: You must COVER your dressing or incision with saran wrap (or any other non-permeable covering) to allow the incision to remain dry while showering.  You may shower 3 days after surgery as long as the surgical wound stays dry. Continue to cover your dressing or incision for showering until your first office visit.  You are strictly prohibited from soaking   or submerging the surgical wound underwater.     Discharge Instruction - Regular Diet Adult   Order Comments: Return to your pre-surgery diet unless instructed otherwise     Order Specific Question Answer Comments   Is discharge order? Yes        Discussed with Dr. Dixon.    Voice-to-text dictation software was utilized in the creation of this note therefore there may be unintended word substitutions, although errors are generally corrected real-time, there is the potential for a rare error to be present in the completed chart. Please do not hesitate to reach out for clarification.    Lino Martel MD  Orthopaedic Surgery Resident  [facility]  02/05/25

## 2025-02-05 NOTE — PLAN OF CARE
Occupational Therapy Discharge Summary    Reason for therapy discharge:    Discharged to home.    Progress towards therapy goal(s). See goals on Care Plan in [facility] electronic health record for goal details.  Goals met    Therapy recommendation(s):    No further therapy is recommended.

## 2025-02-05 NOTE — DISCHARGE SUMMARY
IV removed. Discharge paperwork reviewed with patient. [facility] medications given to patient. Transport took patient in wheelchair down to green parking ramp, where family picked her up.

## 2025-02-05 NOTE — PLAN OF CARE
[id number]    Goal Outcome Evaluation:      Plan of Care Reviewed With: patient    Overall Patient Progress: no changeOverall Patient Progress: no change    Pt is alert and oriented  Regular diet  VSS, CPAP at night  Pt is due to void, bladder scan done every 2 hours (see flowsheet) Pt attempted to void in the toilet but was unable, Latest BS showed    Right PIV Saline locked  SBA  Denies chest pain or shortness of breath  Bed alarm on for safety  Call light within reach  Continue with POC

## 2025-02-05 NOTE — PROGRESS NOTES
Charts reviewed and patient was examined  Patient was resting well. Pain is well controlled   No fever or chills  Eating and drinking well  /86 (BP Location: Right arm)   Pulse 61   Temp 99.1  F (37.3  C) (Oral)   Resp 16   Wt 84 kg (185 lb 3 oz)   LMP  (LMP Unknown)   SpO2 95%   BMI 31.79 kg/m      General Appearance: Awake, alert and not in distress  Respiratory: Clear breath sounds bilaterally   Cardiovascular: Normal heart sounds. No murmurs   GI: Soft, non tender. Normal bowel sounds   Skin: No bruising or bleeding   MSK: Left shoulder with dressings intact. No distal deficit  Other:Awake, alert and orientated X 3      Post op Labs reviewed. Post op Hgb at 12.8    Impression and Plan:  Zayra Ramirez is a 55 year old female patient with a past medical history significant for ILD, moderate persistent asthma, ROBERT w/ CPAP (O2 3L nocturnal), HTN, allergic rhinitis, former tobacco use, anxiety, depression, PTSD, RLS, neuropathy, borderline personality, morbid obesity, GERD, TMJ syndrome, seronegative rheumatoid arthritis, chronic immunosuppression, chronic pain, pseudogout, anemia, ACDF with plate fixation C3-C5 2017, MSSA epidural abscess 2019 s/p laminectomies T11-L1 and antibiotics (associated with spinal cord stimulator removed 8/13/19), hysterectomy, R shoulder pseudogout, primary osteoarthritis of left shoulder who was admitted to [facility] after undergoing left total shoulder arthroplasty with Dr. Dixon on 2/4/25.     S/p Left Total Shoulder Arthroplasty (2/4/25)  Left Shoulder Osteoarthritis  EBL 150cc. No apparent intra-op complications. Patient seen in her room resting comfortable in bed. Pain controlled, block wearing off appropriately.   - Management per primary orthopedics team:  -Pain management: Pre-op block, tylenol, celecoxib, gabapentin, narcotic for breakthrough, ice  -Nausea: Discharge with Zofran for post-op nausea  -Activity: up ad aminta, no left shoulder ROM   -Weight bearing  "status: NWB LUE  -Sling: Ultrasling at all times except hygiene   -DVT prophylaxis: ASA 81mg BID for 4 weeks starting POD1 at 0800  -Wound Care: Dry dressings x 72 hours post-op, ok to shower but no soaking or aggressive scrubbing  - Post op Hgb at 12.8  -Disposition: Home       ROBERT on CPAP  Post op Hypoxia   At baseline, patient uses a CPAP with 3L bled in at night. Currently needing 4L at rest with O2 sat 91% after arriving from PACU. Did not feel short of breath. No fevers, chills, or cough. Lung exam benign with good air movement bilaterally. Likely her oxygen needs are related to a combination of lingering anesthesia, post-op atelectasis and underlying lung disease (ROBERT, ILD).   - Oxygen support. Continue home CPAP at night and when napping. Currently off supplemental Oxygen    - Frequent IS use   - Early mobilization and ambulation   - Albuterol nebulizers   - If oxygen cannot be steadily weaned (or if needs are increasing), then would pursue imaging (CXR followed by CT PE if needed).     Moderate Persistent Asthma  ILD   - Albuterol prn   - Continue PTA Singulair 10mg bedtime   - Patient reports not taking her Trelegy Ellipta consistently due to intermittent issues with thrush (no issues currently) and plans to discuss with her outpatient pulmonologist. Reports if she brushes her teeth afterwards this seems to help.     Chronic Pain  Peripheral Neuropathy  TMJ Syndrome   - Continue PTA Flexeril 10mg bedtime   - Continue PTA Lyrica 150mg BID (0800, 1300)   - PTA Oxycodone 5mg TID prn held for now, see acute pain management as above     Seronegative Rheumatoid Arthritis   - Continue Plaquenil 200mg BID, pre-op note indicated patient should take day of surgery     Anxiety  Depression  PTSD   - Continue PTA Abilify 5mg daily, Buspar 30mg BID, Cymbalta 120mg bedtime   - Clonidine daily prn for severe anxiety \"feeling trapped\"     Allergic Rhinitis   - Continue PTA azelastine nasal spray,      GERD   - Continue PTA " PPI     RLS   - Continue PTA Requip 0.5mg bedtime     HTN  BP controlled currently. Does not use medication at home.   - Continue to monitor         Dr ANALIA Can MD, [facility]  Hospitalist ( Internal medicine)  Pager: [phone]

## 2025-02-05 NOTE — PROGRESS NOTES
Orthopaedic Surgery Progress Note 02/05/2025    Subjective  No acute events overnight.  Pain well controlled with block in effect but starting to wean. Denies new numbness, tingling, or weakness other than that related to block.  Tolerating diet without nausea or vomiting.  Having difficulty voiding but was able to void small amount this AM. Passing flatus, no BM.   Denies fevers, chills, chest pain, SOB.      Objective  Temp: 98  F (36.7  C) Temp src: Oral BP: 109/68 Pulse: 80   Resp: 18 SpO2: 95 % O2 Device: BiPAP/CPAP Oxygen Delivery: 3 LPM    Exam:  Gen: No acute distress, resting comfortably in bed.  Resp: Non-labored breathing  Cardio: Regular rate via peripheral pulse  MSK:  LUE:  - Dressings c/d/I, ultra sling in place  - Fires EPL, FPL, Intrinsics  - SILT medial/radial/ulnar/axillary nerves  - Radial pulse 2+, hand wwp      Recent Labs   Lab 02/05/25  0544   HGB 12.8     Assessment: Zayra Ramirez is a 55 year old female s/p left anatomic TSA on 2/4/25 with Dr. Dixon. Doing well with concern for mild urinary retention.    Today:  - Monitor for urinary retention  - Pain control on orals  - Mobilize with PT  - Advance diet as tolerated  - Bowel regimen  - Discharge to home pending medical stability (urinary retention resolution), pain control, and progress with therapy      Plan:  Pain management: Pre-op block, tylenol, celecoxib, gabapentin, narcotic for breakthrough, ice  Nausea: Discharge with Zofran for post-op nausea  Activity: up ad aminta, no left shoulder ROM   Weight bearing status: NWB LUE  Sling: Ultrasling at all times except hygiene   DVT prophylaxis: ASA 81mg BID for 4 weeks starting POD1 at 0800  Wound Care: Dry dressings x 72 hours post-op, ok to shower but no soaking or aggressive scrubbing  Disposition: Home POD1 pending progress with therapy, tolerating diet, medically stabled        Lino Martel MD  Orthopaedic Surgery Resident  [facility]  [phone]    Please page me at  [phone] with any questions/concerns. If there is no response, if it is a weekend, or if it is during normal workday hours, then please page the orthopaedic surgery resident on call.     Future Appointments   Date Time Provider Department [place]   2/5/2025  9:30 AM [facility]   2/11/2025 10:50 AM Willem Gustafson PT [facility]   2/18/2025  2:00 PM Willem Gustafson PT [facility]   2/19/2025  8:40 AM Nataliia Dixon MD [facility]   2/21/2025  7:40 AM [facility]   2/21/2025  8:30 AM Bishop NIURKA Kiran PA-C [facility]   2/25/2025  8:00 AM Evelia Pope RD [facility]   2/25/2025 10:50 AM Willem Gustafson PT [facility]   4/10/2025 10:00 AM Panchito Saenz MD [facility]

## 2025-02-08 ENCOUNTER — TELEPHONE (OUTPATIENT)
Dept: ORTHOPEDICS | Facility: CLINIC | Age: 55
End: 2025-02-08
Payer: COMMERCIAL

## 2025-02-08 DIAGNOSIS — Z96.612 S/P SHOULDER REPLACEMENT, LEFT: ICD-10-CM

## 2025-02-08 DIAGNOSIS — Z98.1 S/P SPINAL FUSION: ICD-10-CM

## 2025-02-08 NOTE — TELEPHONE ENCOUNTER
Medication Question or Refill    Contacts       Contact Date/Time Type Contact Phone/Fax    02/08/2025 10:03 AM CST Phone (Incoming) Zayra Ramirez (Self) [phone] (M)            What medication are you calling about (include dose and sig)?:  oxyCODONE (ROXICODONE) 5 MG tablet     Preferred Pharmacy:   [place] [address] [place] [facility] [address]  [address] [address]  Phone: [phone] Fax: [phone]      Controlled Substance Agreement on file:   CSA -- Patient Level:    CSA: None found at the patient level.       Who prescribed the medication?:  Dr Dixon     Do you need a refill? Yes    When did you use the medication last? 2/8/25    Patient offered an appointment? No    Do you have any questions or concerns?  No      Could we send this information to you in [facility] or would you prefer to receive a phone call?:   Patient would prefer a phone call   Okay to leave a detailed message?: Yes at Cell number on file:    Telephone Information:   Mobile [phone]

## 2025-02-09 ASSESSMENT — ACTIVITIES OF DAILY LIVING (ADL)
PLACING_AN_OBJECT_ON_A_HIGH_SHELF: 10
WASHING_YOUR_BACK: 10
PUTTING_ON_AN_UNDERSHIRT_OR_A_PULLOVER_SWEATER: 5
AT_ITS_WORST?: 4
PLEASE_INDICATE_YOR_PRIMARY_REASON_FOR_REFERRAL_TO_THERAPY:: SHOULDER
WASHING_YOUR_HAIR?: 10
PUTTING_ON_YOUR_PANTS: 4

## 2025-02-10 RX ORDER — ACETAMINOPHEN 500 MG
500-1000 TABLET ORAL EVERY 6 HOURS PRN
Qty: 100 TABLET | Refills: 1 | Status: SHIPPED | OUTPATIENT
Start: 2025-02-10

## 2025-02-10 RX ORDER — OXYCODONE HYDROCHLORIDE 5 MG/1
5-10 TABLET ORAL EVERY 6 HOURS PRN
Qty: 10 TABLET | Refills: 0 | Status: SHIPPED | OUTPATIENT
Start: 2025-02-10

## 2025-02-10 NOTE — TELEPHONE ENCOUNTER
RN called patient to assess pain and condition post left shoulder surgery on 02/04/25. Patient stating that she is having heightened pain in her arm and boob. She feels that her arm is still heavy, pain is at 6/10. Patient is able to perform thumb opposition without pain, patient has full feeling in her hand and fingers. Tylenol doesn't get rid of all of pain at night. RN will discuss with Dr. Dixon of sending patient with a couple more tablets to get her through working with PT.     Melodie Liz RN

## 2025-02-11 ENCOUNTER — THERAPY VISIT (OUTPATIENT)
Dept: PHYSICAL THERAPY | Facility: CLINIC | Age: 55
End: 2025-02-11
Attending: ORTHOPAEDIC SURGERY
Payer: COMMERCIAL

## 2025-02-11 DIAGNOSIS — M19.012 PRIMARY OSTEOARTHRITIS OF LEFT SHOULDER: ICD-10-CM

## 2025-02-11 PROCEDURE — 97110 THERAPEUTIC EXERCISES: CPT | Mod: GP | Performed by: PHYSICAL THERAPIST

## 2025-02-11 PROCEDURE — 97161 PT EVAL LOW COMPLEX 20 MIN: CPT | Mod: GP | Performed by: PHYSICAL THERAPIST

## 2025-02-11 NOTE — PROGRESS NOTES
PHYSICAL THERAPY EVALUATION  Type of Visit: Evaluation       Fall Risk Screen:  Fall screen completed by: PT  Have you fallen 2 or more times in the past year?: No  Have you fallen and had an injury in the past year?: No  Is patient a fall risk?: No    Subjective  Presents to PT s/p L TSA on 2/4/25 via Dr. Dixon. Having a good amount of pain since surgery. Pt reports she had shoulder issues for years prior to surgery. Pain is worst with various positions, constantly. Denies vague symptoms. Would like to get rid of this pain and return to PLOF pain free. Taking Oxy for the pain. Wants full shoulder function back long term. Wants to be able to gardent and to push off of the left shoulder as needed.          Presenting condition or subjective complaint: Shoulder replacement surgery  Date of onset: 02/04/25    Relevant medical history: Asthma; Depression   Dates & types of surgery:      Prior diagnostic imaging/testing results: MRI; CT scan; X-ray     Prior therapy history for the same diagnosis, illness or injury: Yes      Living Environment  Social support: With a significant other or spouse   Type of home: House   Stairs to enter the home: Yes 2 Is there a railing: No     Ramp: No   Stairs inside the home: Yes 12 Is there a railing: Yes     Help at home: None  Equipment owned: Walker with wheels; Raised toilet seat     Employment: No    Hobbies/Interests:      Patient goals for therapy: Use shoulder    Pain assessment: Location: left left shoulder/Rating: 3/10(with pain meds)     Objective   Posture: forward head, rounded shoulders in sling    *=painful    Shoulder AROM (* = pain) Right Left   FL NT       Full    ABD NT Full    ER/HBH NT Full    IR/HBB NT Full     Shoulder PROM (* = pain) Right Left   FL 60 Full    ABD 50 Full   ER In 30 abduction 5 Full   IR In 30 abduction 15 Full       Palpation tenderness: none    Other Tests: none    Assessment & Plan   CLINICAL IMPRESSIONS  Medical Diagnosis: Right Shoulder pain     Treatment Diagnosis: Right shoulder pain, aftercare s/p replacement of right shoulder   Impression/Assessment: Patient is a 55 year old female with Left shoulder complaints.  The following significant findings have been identified: Pain, Decreased ROM/flexibility, Decreased joint mobility, Decreased strength, Impaired muscle performance, Decreased activity tolerance, and Impaired posture. These impairments interfere with their ability to perform self care tasks, work tasks, recreational activities, household chores, driving , household mobility, and community mobility as compared to previous level of function.     Clinical Decision Making (Complexity):  Clinical Presentation: Stable/Uncomplicated  Clinical Presentation Rationale: based on medical and personal factors listed in PT evaluation  Clinical Decision Making (Complexity): Low complexity    PLAN OF CARE  Treatment Interventions:  Interventions: Manual Therapy, Neuromuscular Re-education, Therapeutic Activity, Therapeutic Exercise, Self-Care/Home Management    Long Term Goals     PT Goal 1  Goal Identifier: Reaching  Goal Description: Pt will be able to reach overhead, out to the side, behind the back and cross body pain free in order to reach cabinets, for dressing, and ADls  Rationale: to maximize safety and independence with performance of ADLs and functional tasks;to maximize safety and independence within the home;to maximize safety and independence within the community;to maximize safety and independence with transportation;to maximize safety and independence with self cares  Goal Progress: new goal  Target Date: 05/06/25  PT Goal 2  Goal Identifier: Lifting  Goal Description: Pt will be able to lift > 5 pounds overhead pain free in order to place things into high spaces with ADLs at home, and perform daily tasks at home  Rationale: to maximize safety and independence with performance of ADLs and functional tasks;to maximize safety and independence  within the home;to maximize safety and independence within the community;to maximize safety and independence with transportation;to maximize safety and independence with self cares  Goal Progress: new goal  Target Date: 05/06/25      Frequency of Treatment: 2x week for 5 week  Duration of Treatment: 1 x week for 7 weeks    Recommended Referrals to Other Professionals: none  Education Assessment:   Learner/Method: Patient;No Barriers to Learning  Education Comments: no concerns    Risks and benefits of evaluation/treatment have been explained.   Patient/Family/caregiver agrees with Plan of Care.     Evaluation Time:     PT Eval, Low Complexity Minutes (37615): 10    Signing Clinician: MARA Lui [facility]                                                                                   OUTPATIENT PHYSICAL THERAPY      PLAN OF TREATMENT FOR OUTPATIENT REHABILITATION   Patient's Last Name, First Name, Zayra Stern YOB: 1970   Provider's Name   [facility]   Medical Record No.  [id number]     Onset Date: 02/04/25  Start of Care Date: 02/11/25     Medical Diagnosis:  Right Shoulder pain      PT Treatment Diagnosis:  Right shoulder pain, aftercare s/p replacement of right shoulder Plan of Treatment  Frequency/Duration: 2x week for 5 week/ 1 x week for 7 weeks    Certification date from 02/11/25 to 05/06/25         See note for plan of treatment details and functional goals     Willem Gustafson PT                         I CERTIFY THE NEED FOR THESE SERVICES FURNISHED UNDER        THIS PLAN OF TREATMENT AND WHILE UNDER MY CARE     (Physician attestation of this document indicates review and certification of the therapy plan).              Referring Provider:  Nataliia Dixon    Initial Assessment  See Epic Evaluation- Start of Care Date: 02/11/25

## 2025-02-13 DIAGNOSIS — Z96.612 S/P SHOULDER REPLACEMENT, LEFT: ICD-10-CM

## 2025-02-13 DIAGNOSIS — M19.012 PRIMARY OSTEOARTHRITIS OF LEFT SHOULDER: Primary | ICD-10-CM

## 2025-02-17 NOTE — PROGRESS NOTES
Zayra Ramirez is a 55 year old who is being evaluated via a billable video visit.      How would you like to obtain your AVS? MyChart  If the video visit is dropped, the invitation should be resent by: Text to cell phone: [phone]  Will anyone else be joining your video visit? No        Medical  Weight Loss Follow-Up Diet Evaluation  Assessment:  Zayra is presenting today for a follow up weight management nutrition consultation.  This patient has had an initial appointment and was referred by Dr. Hoover for MNT as treatment for Obesity     Weight loss medication: none at this time  Pt's weight is 185.2 lbs  Initial weight: 219 lbs  Weight change: 33.8 lbs, 15.4% weight loss        9/20/2023    10:06 AM   Changes and Difficulties   I have made the following changes to my diet since my last visit: lowered carbs   With regards to my diet, I am still struggling with: enough protein   I have made the following changes to my activity/exercise since my last visit: increased walking   With regards to my activity/exercise, I am still struggling with: walking over a mile atatime     BMI: 32.32  Ideal body weight: 54.7 kg (120 lb 9.5 oz)  Adjusted ideal body weight: 66.4 kg (146 lb 6.9 oz)    Estimated RMR (Iredell-St Jeor equation):   1,425 kcals x 1.2 (sedentary) = 1,710 kcals (for weight maintenance)  Recommended Protein Intake: 60-80 grams of protein/day  Patient Active Problem List:  Patient Active Problem List   Diagnosis    Chronic bilateral low back pain without sciatica    Facial cellulitis    Class 1 obesity with serious comorbidity and body mass index (BMI) of 32.0 to 32.9 in adult    Dental abscess    Hypoxia    Subcutaneous emphysema    Borderline personality disorder (H)    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)    Esophageal stricture    Obstruction of esophagus    HTN (hypertension)    Interstitial lung disease (H)    Moderate persistent asthma without complication    Onychomycosis    Restless  leg syndrome    Seasonal allergies    Stress    Respiratory bronchiolitis interstitial lung disease (H)    Spinal epidural abscess    Lumbar spondylosis    Inflammatory arthritis    Arthralgia of temporomandibular joint    Articular disc disorder of temporomandibular joint    Derangement of temporomandibular joint    Calculus of gallbladder without cholecystitis without obstruction    Displacement of articular disc of temporomandibular joint with reduction    Myofascial pain    Oral lesion    Symptomatic cholelithiasis    Sacro-iliac pain    Degenerative lumbar spinal stenosis    Glucose intolerance    Chronic neck pain    Lumbar radiculopathy, chronic    Cervical radiculopathy    Acute pain of right shoulder    Other osteoporosis without current pathological fracture    Diaphragmatic hernia    Bipolar 2 disorder (H)    Choking sensation    Gastro-esophageal reflux disease with esophagitis    Limited opening of mandible    History of pelvic surgery    Voiding dysfunction    Pelvic floor dysfunction    Urinary hesitancy    Urinary frequency    S/P spinal fusion    Cannabis abuse, daily use    Degenerative arthritis of left shoulder region   Diabetes: no    Progress on goals from last visit: Patient stated that she has had CHO cravings over the winter months - this has been more apparent this winter compared to other years. Patient had a shoulder replacement - will be doing PT 6 weeks post op. Discussed adding in more fibrous items with breakfast and lunch. Encouraged patient to have a bottle of water before every can of pop to help with hydration.  Goals:  Increase fluid intake by 1/2 water bottle every few days - goal of 90 ounces/day  Increase consistency with lunch intake  Pair protein and fibrous items together with meals    Dietary Recall:  Breakfast: protein shake (30 grams)   Lunch: hard boiled eggs OR raisin cinnamon bread OR skips  Dinner: protein, potatoes/rice/noodle and vegetable  Snack: sweets  Typical  snacks: cookies (girl  cookies), fruit (banana, berries, orange)  Beverages:   *Goal of 90 ounces/day  Coffee  Zero sugar mountain dew - at 3 cans/day  Water (zero sugar flavor) - 3-4 (48-64 ounces total) 16 ounce bottles  Exercise:   Physical therapy d/t back surgery - will start restarting this to help build her core and for neck strength - patient has been having arthritis issues  Walking and walking the dogs - doing this a little less consistent d/t her posture while walking  -patient plans to have shoulder surgery in February 2025  Nutrition Diagnosis:    Obesity (NC 3.3) related to overeating and poor lifestyle habits as evidenced by patient's subjective statements and BMI of 32.32    Intervention:  Food and/or nutrient delivery: stay consistent with three meals per day. Add in fibrous sources with breakfast and lunch. Aim to have water bottle before every pop.   Nutrition education: high fiber foods, protein sources    Monitoring/Evaluation:    Goals:  Add in a fibrous item with breakfast and lunch  Try having a water bottle before every pop  Continue to gradually increase movement as you are able    Patient to follow up in 2 month(s) with DANIEL      Video-Visit Details    Type of service:  Video Visit    Video Start Time (time video started): 7:57 AM    Video End Time (time video stopped): 8:14 AM    Originating Location (pt. Location): Home      Distant Location (provider location):  Off-site    Mode of Communication:  Video Conference via [facility]    Physician has received verbal consent for a Video Visit from the patient? Yes      Evelia Pope RD

## 2025-02-18 ENCOUNTER — THERAPY VISIT (OUTPATIENT)
Dept: PHYSICAL THERAPY | Facility: CLINIC | Age: 55
End: 2025-02-18
Attending: ORTHOPAEDIC SURGERY
Payer: COMMERCIAL

## 2025-02-18 DIAGNOSIS — M19.012 DEGENERATIVE ARTHRITIS OF LEFT SHOULDER REGION: Primary | ICD-10-CM

## 2025-02-18 PROCEDURE — 97110 THERAPEUTIC EXERCISES: CPT | Mod: GP | Performed by: PHYSICAL THERAPIST

## 2025-02-18 NOTE — PROGRESS NOTES
PLAN  Continue therapy per current plan of care.     02/18/25 0500   Appointment Info   Signing clinician's name / credentials Willem Gustafson, PT, DPT   Total/Authorized Visits ET   Visits Used 2   Medical Diagnosis Right Shoulder pain   PT Tx Diagnosis Right shoulder pain, aftercare s/p replacement of right shoulder   Precautions/Limitations Biceps tenodesis   Progress Note/Certification   Start of Care Date 02/11/25   Onset of illness/injury or Date of Surgery 02/04/25   Therapy Frequency 2x week for 5 week   Predicted Duration 1 x week for 7 weeks   Certification date from 02/11/25   Certification date to 05/06/25   Progress Note Due Date 05/06/25   Progress Note Completed Date 02/11/25   GOALS   PT Goals 2   PT Goal 1   Goal Identifier Reaching   Goal Description Pt will be able to reach overhead, out to the side, behind the back and cross body pain free in order to reach cabinets, for dressing, and ADls   Rationale to maximize safety and independence with performance of ADLs and functional tasks;to maximize safety and independence within the home;to maximize safety and independence within the community;to maximize safety and independence with transportation;to maximize safety and independence with self cares   Goal Progress new goal   Target Date 05/06/25   PT Goal 2   Goal Identifier Lifting   Goal Description Pt will be able to lift > 5 pounds overhead pain free in order to place things into high spaces with ADLs at home, and perform daily tasks at home   Rationale to maximize safety and independence with performance of ADLs and functional tasks;to maximize safety and independence within the home;to maximize safety and independence within the community;to maximize safety and independence with transportation;to maximize safety and independence with self cares   Goal Progress new goal   Target Date 05/06/25   Subjective Report   Subjective Report See Eval   Objective Measures   Objective Measures Objective  Measure 1   Objective Measure 1   Objective Measure PROM   Details Flexion 80, ER in 30 abduction 10, IR in 30 abduction 20   Treatment Interventions (PT)   Interventions Therapeutic Procedure/Exercise   Therapeutic Procedure/Exercise   Therapeutic Procedures: strength, endurance, ROM, flexibility minutes (44936) 19   Therapeutic Procedures Ther Proc 2;Ther Proc 3   Ther Proc 1 Education   Ther Proc 1 - Details Established POC, discussed MARTHA/PHYS of pain, HEP frequency, and activity mod   Ther Proc 2 education   Ther Proc 2 - Details discussion biceps precautions, taking sling off every hour or so   Skilled Intervention Cues for form and control throughout   Patient Response/Progress Tolerated initial HEP well   Ther Proc 3 PROM   Ther Proc 3 - Details x 15 min all planes   Neuromuscular Re-education   PTRx Neuro Re-ed 1 Scapular Retraction/Depression   PTRx Neuro Re-ed 1 - Details No Notes   Education   Learner/Method Patient;No Barriers to Learning   Education Comments no concerns   Plan   Home program Ptrx HEP   Updates to plan of care Eval   Plan for next session Re-assess, progress as able   Total Session Time   Timed Code Treatment Minutes 19   Total Treatment Time (sum of timed and untimed services) 19         Beginning/End Dates of Progress Note Reporting Period:  02/11/25 to 02/18/2025    Referring Provider:  Nataliia Dixon

## 2025-02-19 ENCOUNTER — ANCILLARY PROCEDURE (OUTPATIENT)
Dept: GENERAL RADIOLOGY | Facility: CLINIC | Age: 55
End: 2025-02-19
Attending: ORTHOPAEDIC SURGERY
Payer: COMMERCIAL

## 2025-02-19 ENCOUNTER — OFFICE VISIT (OUTPATIENT)
Dept: ORTHOPEDICS | Facility: CLINIC | Age: 55
End: 2025-02-19
Payer: COMMERCIAL

## 2025-02-19 ENCOUNTER — TELEPHONE (OUTPATIENT)
Dept: PHARMACY | Facility: CLINIC | Age: 55
End: 2025-02-19

## 2025-02-19 DIAGNOSIS — Z96.612 S/P SHOULDER REPLACEMENT, LEFT: Primary | ICD-10-CM

## 2025-02-19 DIAGNOSIS — M19.012 PRIMARY OSTEOARTHRITIS OF LEFT SHOULDER: ICD-10-CM

## 2025-02-19 DIAGNOSIS — Z96.612 S/P SHOULDER REPLACEMENT, LEFT: ICD-10-CM

## 2025-02-19 PROCEDURE — 99024 POSTOP FOLLOW-UP VISIT: CPT | Performed by: ORTHOPAEDIC SURGERY

## 2025-02-19 PROCEDURE — 73030 X-RAY EXAM OF SHOULDER: CPT | Mod: LT | Performed by: RADIOLOGY

## 2025-02-19 NOTE — TELEPHONE ENCOUNTER
[facility] Recruitment: [facility] insurance     Referral outreach attempt #1 on February 19, 2025      Outcome: left voicemail- Call back number [phone] and MyChart message sent    Rae Varela [facility]  -[facility]  [phone]

## 2025-02-19 NOTE — LETTER
2/19/2025      Zayra Ramirez  [address] [place] [address]      Dear Colleague,    Thank you for referring your patient, Zayra Ramirez, to the [facility] [place]. Please see a copy of my visit note below.    CHIEF COMPLAINT: Status post left total shoulder arthroplasty    DATE OF SURGERY: 2/4/2025    HISTORY OF PRESENT ILLNESS: Zayra is an extremely pleasant 54 year-old right hand dominant female who is 2 weeks status post the above procedure.  Reports minimal pain, only taking Tylenol at this point.  Started physical therapy.  Has been compliant with the sling.  SANE R - 100 L - 0    EXAM:  Pleasant adult 54 year old in no distress.  Respirations even and unlabored.  Left upper extremity: Incision clean, dry, and intact. No erythema. No drainage. Sens intact Ax/Musc/Med/Rad/Uln nerves. Motor intact EPL, FPL, and Intrinsics. Shoulder range of motion not tested due to postop restrictions.    DATA REVIEW:  AP and scapular Y xrays of the left shoulder were ordered and reviewed today showing total shoulder arthroplasty with no acute postoperative complication    ASSESSMENT:  1. 2 weeks status post above procedure    PLAN:  I reviewed the intraop findings.   We discussed the plan for rehabilitation.  I discussed need to keep the sling on at all times except for bathing and dressing or home exercises.  Patient was encouraged to perform active range of motion exercises about the hand wrist and elbow.  We discussed a step down regimen for pain medication to over the counter medications. The patient has started physical therapy.  I will see the patient back in 4 weeks with repeat x-rays.      Nataliia Plascencia  Orthopedic Surgery Sports Medicine and Shoulder Surgery      Again, thank you for allowing me to participate in the care of your patient.        Sincerely,        NATALIIA PLASCENCIA MD    Electronically signed

## 2025-02-20 DIAGNOSIS — Z96.612 S/P SHOULDER REPLACEMENT, LEFT: Primary | ICD-10-CM

## 2025-02-20 RX ORDER — HYDROXYCHLOROQUINE SULFATE 200 MG/1
200 TABLET, FILM COATED ORAL 2 TIMES DAILY
Qty: 180 TABLET | Refills: 3 | OUTPATIENT
Start: 2025-02-20

## 2025-02-20 NOTE — ASSESSMENT & PLAN NOTE
BMI IMPROVING Body mass index is 37.59 kg/m , now down to 35.70 with Saxenda 3.0mg    SEVERE OBESITY : Initial bmi is Body mass index is 37.59 kg/m .  With the following weight related comorbid medical conditions: Hypertension, asthma, GERD, gallstones, degenerative spinal stenosis (surgeon suggested weight loss followed by surgery) and chronic back pain.  As well as she is a smoker.  And has mental illness     Contraception - had hysterectomy in 1997.      Patient declined 24 week weight loss program due to cost.      Bariatric surgery was discussed but she is not interested and she has severe mental health issues which may make this very difficult.     Medications started today: saxenda 3mg    Current goal(s): trying to get rid of mountain dew. Also considering tracking diet      Follow up 1 month.      DISCUSSION _________________________________________________________________     Dx: Initial bmi is Body mass index is 37.59 kg/m .  With the following weight related comorbid medical conditions: Hypertension, asthma, GERD, gallstones, degenerative spinal stenosis (surgeon suggested weight loss followed by surgery) and chronic back pain.  As well as she is a smoker.  And has mental illness     BECAUSE OF ABOVE PROBLEMS IT IS STRONGLY ADVISED THAT Zayra LOSE WEIGHT.  BELOW IS MY PLAN FOR her      Start weight 219 lbs, Body mass index is 37.59 kg/m .  7/26/2022 9/6/2022 weight 208lbs. 9/6/2022     Medication notes:   Medications she takes, that are known to increase weight:abilify, zyrtec, cymbalta, methotrexate, lyrica and flexeril.   Medications she takes, known to decrease weight: none     SAXENDA  7/26/2022  - 9/6/2022 - now taking 3 mg.     Metformin  -contraindicated due to A1c of 5.3 July 2022  Wellbutrin - Contraindicated due to psych polypharmacy  Naltrexone contraindicated due to chronic pain  Contrave -contraindicated due to psychiatric polypharmacy and chronic pain  Phentermine/ diethylproprion   Yes -contraindicated due to psychiatric polypharmacy  Topamax - could consider if saxenda not covered.   Qsymia contraindicated due to psychiatric poly pharmacy.   Orlistat could consider.   Plenity could consider.      Potential barriers to weight loss identified thus far:   -She has suffered from excess weight since her teenage years.  -Mental health issues have played a role  -Weight positive medications have played a role  -Quitting smoking has played a role in her excess weight  -Eating the wrong types of food  -Lack of exercise - due to back pain  -Chronic back pain -keeps her from being more active  - Genetics, mother and father both overweight  -She is disabled.  -Uses white carbohydrates  -Eats most of her food at the end of the day  -She has had to eat at the food shelf at least a few times this year  -Craves sweets, candy, chocolate, cheeses  -Drinks Mountain Dew but trying to cut down - 9/6/2022 switched from regular mt dew zero.       Strengths that may help weight loss:  -She is a   -Likes vegetables  -Drinks water

## 2025-02-21 ENCOUNTER — ANCILLARY PROCEDURE (OUTPATIENT)
Dept: CT IMAGING | Facility: CLINIC | Age: 55
End: 2025-02-21
Attending: PHYSICIAN ASSISTANT
Payer: COMMERCIAL

## 2025-02-21 ENCOUNTER — OFFICE VISIT (OUTPATIENT)
Dept: ORTHOPEDICS | Facility: CLINIC | Age: 55
End: 2025-02-21
Payer: COMMERCIAL

## 2025-02-21 DIAGNOSIS — Z98.1 S/P CERVICAL SPINAL FUSION: ICD-10-CM

## 2025-02-21 PROCEDURE — 72125 CT NECK SPINE W/O DYE: CPT | Performed by: RADIOLOGY

## 2025-02-21 NOTE — LETTER
2/21/2025      Zayra PADRON James  [address] [place] [address]      Dear Colleague,    Thank you for referring your patient, Zayra Ramirez, to the [facility] [place]. Please see a copy of my visit note below.    No notes on file    Again, thank you for allowing me to participate in the care of your patient.        Sincerely,        Bishop ASHANTI Kiran PA-C    Electronically signed

## 2025-02-21 NOTE — PROGRESS NOTES
In person visit    Spine Surgical Hx:  10/16/2017 - ACDF with plate fixation C3-C5 (2 levels); use of Cornerstone allograft (Dr. Rylan Monique, [facility]).  [Implants: Medtronic Zevo plate].  08/08/2019 - SCS implantation ([facility]); complicated by epidural abscess MSSA culture positive, treated with drainage/laminectomy and IV antibiotics.  Thus, the SCS was removed.  08/19/2019 - Laminectomies T11-L1 (T12-L2) ([facility]  11/30/2022 - PISF T8-pelvis with bilateral stacked pelvic fixation; TLIF-SPO L1-S1 (5 levels); use of Infuse BMP and allograft (Choco/Shavon) for multilevel sponydylosis and stenosis, thoracolumbar junction kyphosis.  [Implants: Medtronic Solera, 5.5 mm CoCr rods x4 (UNID rods x2); Capstone Control PTC cages; SI-Bone Granite screws x 4].  02/12/2024 - PISF C2-C5; laminectomies C3 and C4; use of allograft (Cherybrano) for pseudarthrosis C3-C5; spondylolisthesis C2-3.  [Implants: Medtronic Infinity screw system, 3.5 mm CoCr rods x2].     BMD Hx:  11/24/21 - DEXA spine/hip/wrist.  T-score = -1.1 (Left femoral neck).  Imp: Osteopenia.  12/8/21 - Saw Dr. Guerra ([facility]).  P> RTC 6 wks; had not been seen since.    Reason for Visit:  Chief Complaint   Patient presents with    RECHECK     6 month f/u from 8/16 - DOS: 2/12/24 //Posterior instrumented spinal fusion cervical 2-5; laminectomies cervical 3-4; use of local autograft and crushed cancellous allograft.     S>  54 year old female, RHD, now 12mo post op s/p C2-C5 PISF and C3/C4 laminectomy     She is pleased she had her neck surgery. Prior to it she had constant pain in the neck and pinching down the arms. Denies any neck pain at this time. 90% improved with neck symptoms. She is 4 weeks out from shoulder surgery.  She notes that she was found to have bone-on-bone and all the cartilage was gone which resulted in limited range of motion.  She is pleased that we referred her to sports medicine as it has led to her shoulder replacement.  She  denies any left shoulder pain.    Oswestry (KATIE) Questionnaire        2/16/2025     7:59 AM   OSWESTRY DISABILITY INDEX   Count 4    Sum 3    Oswestry Score (%) 15 %        Patient-reported      S/p PISF T8-pelvis (11/30/22):  KATIE preop          70%  6wk                    40%  3mo                   45%  6mo                   18%  1yr                     24.44%       Neck Disability Index (NDI) Questionnaire        2/16/2025     8:03 AM   Neck Disability Index (NDI)   Neck Disability Index: Count 10   NDI: Total Score = SUM (points for all 10 findings) 10   Neck Disability in Percent = (Total Score) / 50 * 100 20 (%)     NDI preop  35.56%  4wk                    NR  12wk                  11%  6mo                   11%  1yr                     20%    Visual Analog Pain Scale  Back Pain Scale 0-10: 0  Right leg pain: 0  Left leg pain: 0  Neck Pain Scale 0-10: 0  Right arm pain: 0  Left arm pain: 0    PROMIS-10 Scores  Global Mental Health Score: (Patient-Rptd) (P) 15  Global Physical Health Score: (Patient-Rptd) (P) 15  PROMIS TOTAL - SUBSCORES: (Patient-Rptd) (P) 30    O>   Alert, oriented x 3, cooperative.  Not in CP distress.  LMP  (LMP Unknown)   Surgical incision well-healed, no sign of infection.  Ambulates independently.   Grossly neurologically intact.    Imaging:   CT cervical: No hardware concerns.  No broken rods or screws.  Bridging bone formation over facet joints from posterior cervical fusion.  No signs of pseudoarthrosis    A>  Cervical spine:  1.  1yr s/p PISF C2-C5 (2/12/24) for pseudarthrosis s/p ACDF, with ongoing R sided numbness in deltoid and hands  Thoracolumbar spine:  1.  >1 yr s/p PISF T8-pelvis; TLIF-SPO L1-S1 (5 levels) (11/20/2022), doing well, with improved posture and preoperative pain.  2.  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  3.  Suprajacent spondylosis T7-8; asymptomatic.   4.  History of rheumatoid arthritis  5.  Bilateral left greater than  right shoulder pain, 4 weeks s/p left shoulder replacement.     P>   She is 1 year from surgery and she is doing great.  She notes he is 90% improved.  Her CT scan shows solid bone formation bridging over the facet joints without any signs of pseudoarthrosis.  Plan to return to clinic as needed.  She has left sided muscle spasms radiating over the costal borders. Educated if those symptoms do not improved we would get additional imaging for evaluation. She has history of T8-pelvis fusion, mild adjacent level degeneration and T9 mild crew lucency. Suspect current limitations is due to recovery from her left shoulder replacement.     - return to clinic prn.     Bishop ASHANTI Kiran PA-C

## 2025-02-21 NOTE — NURSING NOTE
Reason For Visit:   Chief Complaint   Patient presents with    RECHECK     6 month f/u from 8/16 - DOS: 2/12/24 //Posterior instrumented spinal fusion cervical 2-5; laminectomies cervical 3-4; use of local autograft and crushed cancellous allograft.       Primary MD: Neena Tam  Ref. MD: Est    ?  No  Occupation .  Currently working? No.  Work status?  Full time.  Date of injury: 8/16/24  Type of injury: bome.  Date of surgery: [facility] Hx:  10/16/2017 - ACDF with plate fixation C3-C5 (2 levels); use of Cornerstone allograft (Dr. Rylan Monique, [facility]).  [Implants: Medtronic Zevo plate].  08/08/2019 - SCS implantation ([facility]); complicated by epidural abscess MSSA culture positive, treated with drainage/laminectomy and IV antibiotics.  Thus, the SCS was removed.  08/19/2019 - Laminectomies T11-L1 (T12-L2) ([facility]).  11/30/2022 - PISF T8-pelvis with bilateral stacked pelvic fixation; TLIF-SPO L1-S1 (5 levels); use of Infuse BMP and allograft (Choco/Shavon) for multilevel sponydylosis and stenosis, thoracolumbar junction kyphosis.  [Implants: Medtronic Solera, 5.5 mm CoCr rods x4 (UNID rods x2); Capstone Control PTC cages; SI-Bone Granite screws x 4].  02/12/2024 - PISF C2-C5; laminectomies C3 and C4; use of allograft (Cherybrano) for pseudarthrosis C3-C5; spondylolisthesis C2-3.  [Implants: Medtronic Infinity screw system, 3.5 mm CoCr rods x2].  Type of surgery: Spinal.  Smoker: No  Request smoking cessation information: No    LMP  (LMP Unknown)     Pain Assessment  Patient Currently in Pain: No    Oswestry (KATIE) Questionnaire        2/16/2025     7:59 AM   OSWESTRY DISABILITY INDEX   Count 4    Sum 3    Oswestry Score (%) 15 %        Patient-reported            Neck Disability Index (NDI) Questionnaire        2/16/2025     8:03 AM   Neck Disability Index (NDI)   Neck Disability Index: Count 10   NDI: Total Score = SUM (points for all 10 findings) 10   Neck Disability  in Percent = (Total Score) / 50 * 100 20 (%)              Visual Analog Pain Scale  Back Pain Scale 0-10: 0  Right leg pain: 0  Left leg pain: 0  Neck Pain Scale 0-10: 0  Right arm pain: 0  Left arm pain: 0    Promis 10 Assessment        2/16/2025     8:01 AM   PROMIS 10   In general, would you say your health is: Very good   In general, would you say your quality of life is: Very good   In general, how would you rate your physical health? Good   In general, how would you rate your mental health, including your mood and your ability to think? Very good   In general, how would you rate your satisfaction with your social activities and relationships? Good   In general, please rate how well you carry out your usual social activities and roles Very good   To what extent are you able to carry out your everyday physical activities such as walking, climbing stairs, carrying groceries, or moving a chair? Mostly   In the past 7 days, how often have you been bothered by emotional problems such as feeling anxious, depressed, or irritable? Rarely   In the past 7 days, how would you rate your fatigue on average? Mild   In the past 7 days, how would you rate your pain on average, where 0 means no pain, and 10 means worst imaginable pain? 2   In general, would you say your health is: 4   In general, would you say your quality of life is: 4   In general, how would you rate your physical health? 3   In general, how would you rate your mental health, including your mood and your ability to think? 4   In general, how would you rate your satisfaction with your social activities and relationships? 3   In general, please rate how well you carry out your usual social activities and roles. (This includes activities at home, at work and in your community, and responsibilities as a parent, child, spouse, employee, friend, etc.) 4   To what extent are you able to carry out your everyday physical activities such as walking, climbing stairs,  carrying groceries, or moving a chair? 4   In the past 7 days, how often have you been bothered by emotional problems such as feeling anxious, depressed, or irritable? 2   In the past 7 days, how would you rate your fatigue on average? 2   In the past 7 days, how would you rate your pain on average, where 0 means no pain, and 10 means worst imaginable pain? 2   Global Mental Health Score 15    Global Physical Health Score 15    PROMIS TOTAL - SUBSCORES 30        Patient-reported                [place] MA

## 2025-02-25 ENCOUNTER — VIRTUAL VISIT (OUTPATIENT)
Dept: SURGERY | Facility: CLINIC | Age: 55
End: 2025-02-25
Payer: COMMERCIAL

## 2025-02-25 ENCOUNTER — THERAPY VISIT (OUTPATIENT)
Dept: PHYSICAL THERAPY | Facility: CLINIC | Age: 55
End: 2025-02-25
Attending: ORTHOPAEDIC SURGERY
Payer: COMMERCIAL

## 2025-02-25 DIAGNOSIS — M19.012 DEGENERATIVE ARTHRITIS OF LEFT SHOULDER REGION: Primary | ICD-10-CM

## 2025-02-25 DIAGNOSIS — E66.9 OBESITY (BMI 30-39.9): Primary | ICD-10-CM

## 2025-02-25 DIAGNOSIS — Z71.3 NUTRITIONAL COUNSELING: ICD-10-CM

## 2025-02-25 PROCEDURE — 97110 THERAPEUTIC EXERCISES: CPT | Mod: GP | Performed by: PHYSICAL THERAPIST

## 2025-02-25 PROCEDURE — 97803 MED NUTRITION INDIV SUBSEQ: CPT | Mod: 95 | Performed by: DIETITIAN, REGISTERED

## 2025-02-25 NOTE — LETTER
2/25/2025      Zayra Ramirez  [address] [place] [address]      Dear Colleague,    Thank you for referring your patient, Zayra Ramirez, to the [facility] AND [facility] [place]. Please see a copy of my visit note below.    Zayra Ramirez is a 55 year old who is being evaluated via a billable video visit.      How would you like to obtain your AVS? MyChart  If the video visit is dropped, the invitation should be resent by: Text to cell phone: [phone]  Will anyone else be joining your video visit? No        Medical  Weight Loss Follow-Up Diet Evaluation  Assessment:  Zayra is presenting today for a follow up weight management nutrition consultation.  This patient has had an initial appointment and was referred by Dr. Hoover for MNT as treatment for Obesity     Weight loss medication: none at this time  Pt's weight is 185.2 lbs  Initial weight: 219 lbs  Weight change: 33.8 lbs, 15.4% weight loss        9/20/2023    10:06 AM   Changes and Difficulties   I have made the following changes to my diet since my last visit: lowered carbs   With regards to my diet, I am still struggling with: enough protein   I have made the following changes to my activity/exercise since my last visit: increased walking   With regards to my activity/exercise, I am still struggling with: walking over a mile atatime     BMI: 32.32  Ideal body weight: 54.7 kg (120 lb 9.5 oz)  Adjusted ideal body weight: 66.4 kg (146 lb 6.9 oz)    Estimated RMR ([place]-St Jeor equation):   1,425 kcals x 1.2 (sedentary) = 1,710 kcals (for weight maintenance)  Recommended Protein Intake: 60-80 grams of protein/day  Patient Active Problem List:  Patient Active Problem List   Diagnosis     Chronic bilateral low back pain without sciatica     Facial cellulitis     Class 1 obesity with serious comorbidity and body mass index (BMI) of 32.0 to 32.9 in adult     Dental abscess     Hypoxia     Subcutaneous  emphysema     Borderline personality disorder (H)     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)     Esophageal stricture     Obstruction of esophagus     HTN (hypertension)     Interstitial lung disease (H)     Moderate persistent asthma without complication     Onychomycosis     Restless leg syndrome     Seasonal allergies     Stress     Respiratory bronchiolitis interstitial lung disease (H)     Spinal epidural abscess     Lumbar spondylosis     Inflammatory arthritis     Arthralgia of temporomandibular joint     Articular disc disorder of temporomandibular joint     Derangement of temporomandibular joint     Calculus of gallbladder without cholecystitis without obstruction     Displacement of articular disc of temporomandibular joint with reduction     Myofascial pain     Oral lesion     Symptomatic cholelithiasis     Sacro-iliac pain     Degenerative lumbar spinal stenosis     Glucose intolerance     Chronic neck pain     Lumbar radiculopathy, chronic     Cervical radiculopathy     Acute pain of right shoulder     Other osteoporosis without current pathological fracture     Diaphragmatic hernia     Bipolar 2 disorder (H)     Choking sensation     Gastro-esophageal reflux disease with esophagitis     Limited opening of mandible     History of pelvic surgery     Voiding dysfunction     Pelvic floor dysfunction     Urinary hesitancy     Urinary frequency     S/P spinal fusion     Cannabis abuse, daily use     Degenerative arthritis of left shoulder region   Diabetes: no    Progress on goals from last visit: Patient stated that she has had CHO cravings over the winter months - this has been more apparent this winter compared to other years. Patient had a shoulder replacement - will be doing PT 6 weeks post op. Discussed adding in more fibrous items with breakfast and lunch. Encouraged patient to have a bottle of water before every can of pop to help with hydration.  Goals:  Increase fluid intake by 1/2 water  bottle every few days - goal of 90 ounces/day  Increase consistency with lunch intake  Pair protein and fibrous items together with meals    Dietary Recall:  Breakfast: protein shake (30 grams)   Lunch: hard boiled eggs OR raisin cinnamon bread OR skips  Dinner: protein, potatoes/rice/noodle and vegetable  Snack: sweets  Typical snacks: cookies (girl  cookies), fruit (banana, berries, orange)  Beverages:   *Goal of 90 ounces/day  Coffee  Zero sugar mountain dew - at 3 cans/day  Water (zero sugar flavor) - 3-4 (48-64 ounces total) 16 ounce bottles  Exercise:   Physical therapy d/t back surgery - will start restarting this to help build her core and for neck strength - patient has been having arthritis issues  Walking and walking the dogs - doing this a little less consistent d/t her posture while walking  -patient plans to have shoulder surgery in February 2025  Nutrition Diagnosis:    Obesity (NC 3.3) related to overeating and poor lifestyle habits as evidenced by patient's subjective statements and BMI of 32.32    Intervention:  Food and/or nutrient delivery: stay consistent with three meals per day. Add in fibrous sources with breakfast and lunch. Aim to have water bottle before every pop.   Nutrition education: high fiber foods, protein sources    Monitoring/Evaluation:    Goals:  Add in a fibrous item with breakfast and lunch  Try having a water bottle before every pop  Continue to gradually increase movement as you are able    Patient to follow up in 2 month(s) with RD      Video-Visit Details    Type of service:  Video Visit    Video Start Time (time video started): 7:57 AM    Video End Time (time video stopped): 8:14 AM    Originating Location (pt. Location): Home      Distant Location (provider location):  Off-site    Mode of Communication:  Video Conference via [facility]    Physician has received verbal consent for a Video Visit from the patient? Yes      Evelia Ppoe RD           Again, thank  you for allowing me to participate in the care of your patient.        Sincerely,        Evelia Pope RD    Electronically signed

## 2025-02-27 ENCOUNTER — VIRTUAL VISIT (OUTPATIENT)
Dept: PHARMACY | Facility: CLINIC | Age: 55
End: 2025-02-27
Payer: COMMERCIAL

## 2025-02-27 DIAGNOSIS — F32.9 MAJOR DEPRESSIVE DISORDER, REMISSION STATUS UNSPECIFIED, UNSPECIFIED WHETHER RECURRENT: ICD-10-CM

## 2025-02-27 DIAGNOSIS — K59.03 DRUG-INDUCED CONSTIPATION: ICD-10-CM

## 2025-02-27 DIAGNOSIS — J45.40 MODERATE PERSISTENT ASTHMA WITHOUT COMPLICATION: Primary | ICD-10-CM

## 2025-02-27 DIAGNOSIS — F31.81 BIPOLAR 2 DISORDER (H): ICD-10-CM

## 2025-02-27 DIAGNOSIS — R11.0 NAUSEA: ICD-10-CM

## 2025-02-27 DIAGNOSIS — M13.80 SERONEGATIVE INFLAMMATORY ARTHRITIS: ICD-10-CM

## 2025-02-27 DIAGNOSIS — R52 PAIN: ICD-10-CM

## 2025-02-27 DIAGNOSIS — J30.2 SEASONAL ALLERGIES: ICD-10-CM

## 2025-02-27 DIAGNOSIS — K21.00 GASTROESOPHAGEAL REFLUX DISEASE WITH ESOPHAGITIS, UNSPECIFIED WHETHER HEMORRHAGE: ICD-10-CM

## 2025-02-27 DIAGNOSIS — Z79.899 ON DEEP VEIN THROMBOSIS (DVT) PROPHYLAXIS: ICD-10-CM

## 2025-02-27 DIAGNOSIS — F41.9 ANXIETY: ICD-10-CM

## 2025-02-27 DIAGNOSIS — G25.81 RESTLESS LEGS SYNDROME (RLS): ICD-10-CM

## 2025-02-27 DIAGNOSIS — Z78.9 TAKES DIETARY SUPPLEMENTS: ICD-10-CM

## 2025-02-27 PROCEDURE — 99605 MTMS BY PHARM NP 15 MIN: CPT | Mod: 93

## 2025-02-27 ASSESSMENT — ASTHMA QUESTIONNAIRES
QUESTION_2 LAST FOUR WEEKS HOW OFTEN HAVE YOU HAD SHORTNESS OF BREATH: MORE THAN ONCE A DAY
QUESTION_1 LAST FOUR WEEKS HOW MUCH OF THE TIME DID YOUR ASTHMA KEEP YOU FROM GETTING AS MUCH DONE AT WORK, SCHOOL OR AT HOME: A LITTLE OF THE TIME
ACUTE_EXACERBATION_TODAY: NO
ACT_TOTALSCORE: 11
QUESTION_5 LAST FOUR WEEKS HOW WOULD YOU RATE YOUR ASTHMA CONTROL: SOMEWHAT CONTROLLED
QUESTION_3 LAST FOUR WEEKS HOW OFTEN DID YOUR ASTHMA SYMPTOMS (WHEEZING, COUGHING, SHORTNESS OF BREATH, CHEST TIGHTNESS OR PAIN) WAKE YOU UP AT NIGHT OR EARLIER THAN USUAL IN THE MORNING: TWO OR THREE NIGHTS A WEEK
QUESTION_4 LAST FOUR WEEKS HOW OFTEN HAVE YOU USED YOUR RESCUE INHALER OR NEBULIZER MEDICATION (SUCH AS ALBUTEROL): THREE OR MORE TIMES PER DAY
ACT_TOTALSCORE: 11

## 2025-02-27 NOTE — LETTER
_  Medication List        Prepared on: Feb 27, 2025     Bring your Medication List when you go to the doctor, hospital, or   emergency room. And, share it with your family or caregivers.     Note any changes to how you take your medications.  Cross out medications when you no longer use them.    Medication How I take it Why I use it Prescriber   acetaminophen (TYLENOL) 500 MG tablet Take 1-2 tablets (500-1,000 mg) by mouth every 6 hours as needed for mild pain. Pain Leigh Ann Hutson PA-C   albuterol (PROAIR HFA/PROVENTIL HFA/VENTOLIN HFA) 108 (90 Base) MCG/ACT inhaler Inhale 2 puffs into the lungs every 6 hours as needed for shortness of breath / dyspnea or wheezing Asthma NIKCO Martinez CNP   albuterol (PROVENTIL) (2.5 MG/3ML) 0.083% neb solution INHALE 3 ML VIA A NEBULIZER 4 TIMES DAILY IF NEEDED. Asthma Patient Reported   ARIPiprazole (ABILIFY) 5 MG tablet Take 5 mg by mouth every morning Bipolar Entered By History Unknown   aspirin 81 MG EC tablet Take 1 tablet (81 mg) by mouth 2 times daily. S/P shoulder replacement Nataliia Dixon MD   azelastine (ASTELIN) 0.1 % nasal spray Spray 2 sprays into both nostrils 2 times daily. Allergies Patient Reported   busPIRone HCl (BUSPAR) 30 MG tablet Take 30 mg by mouth 2 times daily  Anxiety Patient Reported   celecoxib (CELEBREX) 100 MG capsule TAKE 1 CAPSULE (100 MG) BY MOUTH 2 TIMES DAILY. AS NEEDED FOR JOINT PAIN Seronegative inflammatory arthritis Panchito Saenz MD   cloNIDine (CATAPRES) 0.1 MG tablet Take 1 tablet by mouth daily as needed (anxiety, panic attack). Anxiety Patient Reported   cyclobenzaprine (FLEXERIL) 10 MG tablet Take 1 tablet (10 mg) by mouth at bedtime. Pain NICKO Martinez CNP   DULoxetine (CYMBALTA) 60 MG capsule Take 120 mg by mouth At Bedtime  Depression Entered By History Unknown   Elemental iron 65 mg Vitamin C 125 mg (VITRON C)  MG TABS tablet Take 1 tablet by mouth daily. Supplement Patient Reported   EPINEPHrine (ANY  BX GENERIC EQUIV) 0.3 MG/0.3ML injection 2-pack Inject 0.3 mLs (0.3 mg) into the muscle as needed for anaphylaxis May repeat one time in 5-15 minutes if response to initial dose is inadequate. Anaphylaxis NICKO Martinez CNP   hydroxychloroquine (PLAQUENIL) 200 MG tablet Take 1 tablet (200 mg) by mouth 2 times daily Seronegative inflammatory arthritis Panchito Saenz MD   montelukast (SINGULAIR) 10 MG tablet Take 1 tablet by mouth daily. Asthma, allergies Patient Reported   naloxone (NARCAN) 4 MG/0.1ML nasal spray Spray 1 spray (4 mg) into one nostril alternating nostrils as needed for opioid reversal every 2-3 minutes until assistance arrives Accidental opioid overdose Abhi Aaron NP   omeprazole (PRILOSEC) 40 MG DR capsule Take 1 capsule (40 mg) by mouth daily. Gastroesophageal reflux disease Leigh Ann Hutson PA-C   ondansetron (ZOFRAN ODT) 4 MG ODT tab Take 1 tablet (4 mg) by mouth every 8 hours as needed for nausea. Dissolve ON the tongue. Nausea Nataliia Dixon MD   oxyCODONE (ROXICODONE) 5 MG tablet Take 1-2 tablets (5-10 mg) by mouth every 6 hours as needed for moderate to severe pain. Pain Leigh Ann Hutson PA-C   pregabalin (LYRICA) 150 MG capsule Take 1 capsule by mouth in the morning and around 1 PM Pain NICKO Martinez CNP   rOPINIRole (REQUIP) 0.5 MG tablet Take 1 tablet (0.5 mg) by mouth at bedtime. Restless leg syndrome Bennett Ezra Goltz, PA-C   senna-docusate (SENOKOT-S/PERICOLACE) 8.6-50 MG tablet Take 1-2 tablets by mouth 2 times daily. Take while on oral narcotics to prevent or treat constipation. Constipation Nataliia Dixon MD   TRELEGY ELLIPTA 100-62.5-25 MCG/ACT oral inhaler Inhale 1 puff into the lungs daily. Asthma Patient Reported   vitamin D3 (CHOLECALCIFEROL) 50 mcg (2000 units) tablet Take 50 mcg by mouth daily. Supplement Patient Reported   vitamin E (TOCOPHEROL) 400 units (180 mg) capsule Take 800 Units by mouth daily. Supplement Patient Reported         Add new  medications, over-the-counter drugs, herbals, vitamins, or  minerals in the blank rows below.    Medication How I take it Why I use it Prescriber                                      Allergies:      - Bee Venom - Anaphylaxis  - Doxycycline - Anaphylaxis  - Erythromycin  - Hydrocodone-acetaminophen - Itching        Side effects I have had:      Not on File        Other Information:              My notes and questions:

## 2025-02-27 NOTE — PROGRESS NOTES
Medication Therapy Management (MTM) Encounter    ASSESSMENT:                            Medication Adherence/Access: No issues identified.    Asthma, Allergy  Stable.     GERD, Nausea, Constipation  Stable.     Mental Health (Anxiety, Depression, Bipolar 2)  Stable, follows with psychiatry.    Pain s/p Shoulder Replacement (2/4/25)  Stable.     DVT Prophylaxis s/p Shoulder Replacement (2/4/25)  Stable.    Seronegative Inflammatory Arthritis  Stable, follows with rheumatology.    RLS  Stable.    Supplements   Stable.    PLAN:                            No changes today    Follow-up: in 1 year, or sooner if needed    SUBJECTIVE/OBJECTIVE:                          Zayra Ramirez is a 55 year old female seen for a annual medication review. She was referred to me from [employer].      Reason for visit: Annual medication review.    Allergies/ADRs: Reviewed in chart  Past Medical History: Reviewed in chart  Tobacco: She reports that she quit smoking about 3 years ago. Her smoking use included cigarettes. She started smoking about 29 years ago. She has a 52.5 pack-year smoking history. She quit smokeless tobacco use about 3 years ago.  Alcohol: Less than 1 beverage / month    Medication Adherence/Access: no issues reported.    Asthma , Allergy  Albuterol HFA 2 puffs every 6 hours as needed - once daily  Albuterol neb 2.5 mg 4 times daily as needed - uses more in the winter  Trelegy 100-62.5-25 mcg 1 puff daily  Montelukast 10 mg daily  Azelastine 0.1% 2 sprays in both nostrils twice daily  EpiPen 0.3 mg as needed - has on hand, hasn't needed to use  Patient rinses their mouth after using steroid inhaler.   Patient reports no current medication side effects.      Triggers include: pollens and mold.  Primary symptoms are post-nasal drip and itchy/watery eyes.   Restarted Trelegy a couple weeks ago and breathing has improved.  Patient feels that current therapy is effective, is using albuterol less.     GERD , Nausea,  Constipation  Omeprazole 40 mg daily  Ondansetron ODT 4 mg every 8 hours as needed - was given after surgery, but hasn't needed much. Still has some tablets on hand.  Senna S 1-2 tablets twice daily as needed - using a few times a week  Patient reports no current symptoms.   Patient feels that current regimen is effective.  The patient does not have a history of GI bleed, but has LA grade A esophagitis.  The patient does notice symptoms if they miss a dose.  Patient has not tried a trial off of therapy and is not interested in doing so.     Mental Health (Anxiety, Depression, Bipolar 2) - Follows with Psychiatry  Aripiprazole 5 mg every morning  Buspirone 30 mg twice daily  Clonidine 0.1 mg daily as needed for anxiety - takes 1-2 times a week  Duloxetine 120 mg at bedtime  Patient reports no current medication side effects.  Patient reports symptoms are improved and stable.    Pain s/p Shoulder Replacement (2/4/25)  Acetaminophen 500-1000 mg every 6 hours as needed - takes daily  Cyclobenzaprine 10 mg at bedtime  Oxycodone 5-10 mg every 6 hours as needed - uses more if doing PT exercises  Narcan nasal spray as needed - has on hand, hasn't needed to use  Pregabalin 150 mg twice daily  Pain is well controlled with current regimen.  Reports occasionally drowsiness during the day, but it's manageable.  Patient reports no other medication side effects.         DVT Prophylaxis s/p Shoulder Replacement (2/4/25)  Aspirin 81 mg twice daily for 30 days  Reports some bruising, but no bleeding.    Seronegative Inflammatory Arthritis - Follows with Rheumatology  Hydroxychloroquine 200 mg twice daily  Celecoxib 100 mg twice daily as needed - takes at least once daily  Patient reports no current medication side effects.      Current regimen is effective, is getting regular eye exams.    RLS  Ropinirole 0.5 mg at bedtime  Patient reports no current medication side effects.      Ropinirole is helpful for restless  legs.    Supplements   Vitamin D 2000 units daily  Vitamin E 360 mg daily  Iron with vitamin C 1 tablet daily  No reported issues at this time.      Today's Vitals: LMP  (LMP Unknown)   ----------------    I spent 32 minutes with this patient today.     A summary of these recommendations was sent via [employer].    Ankur Rivers (Hailie), BalaD, MPH  Medication Therapy Management Pharmacist     Telemedicine Visit Details  The patient's medications can be safely assessed via a telemedicine encounter.  Type of service:  Telephone visit  Originating Location (pt. Location): Home    Distant Location (provider location):  On-site  Start Time:  3:00 PM  End Time:  3:32 PM     Medication Therapy Recommendations  No medication therapy recommendations to display

## 2025-02-27 NOTE — LETTER
"Recommended To-Do List      Prepared on: Feb 27, 2025       You can get the best results from your medications by completing the items on this \"To-Do List.\"      Bring your To-Do List when you go to your doctor. And, share it with your family or caregivers.    My To-Do List:  What we talked about: What I should do:     Your medications seem to be working well for you.     Continue taking your medications as prescribed.                "

## 2025-02-27 NOTE — LETTER
March 4, 2025  Zayra Ramirez  [address] [place] [address]    Dear Ms. Ramirez, [facility]     Thank you for talking with me on Feb 27, 2025 about your health and medications. As a follow-up to our conversation, I have included two documents:      Your Recommended To-Do List has steps you should take to get the best results from your medications.  Your Medication List will help you keep track of your medications and how to take them.    If you want to talk about these documents, please call Cherelle Rivers RPH at phone: [phone], Monday-Friday 8-4:30pm.    I look forward to working with you and your doctors to make sure your medications work well for you.    Sincerely,  Cherelle Rivers RPH  [facility] Pharmacist, [facility]

## 2025-03-04 ENCOUNTER — THERAPY VISIT (OUTPATIENT)
Dept: PHYSICAL THERAPY | Facility: CLINIC | Age: 55
End: 2025-03-04
Payer: COMMERCIAL

## 2025-03-04 DIAGNOSIS — M19.012 DEGENERATIVE ARTHRITIS OF LEFT SHOULDER REGION: Primary | ICD-10-CM

## 2025-03-04 PROCEDURE — 97110 THERAPEUTIC EXERCISES: CPT | Mod: GP | Performed by: PHYSICAL THERAPIST

## 2025-03-04 RX ORDER — CHOLECALCIFEROL (VITAMIN D3) 50 MCG
50 TABLET ORAL DAILY
COMMUNITY

## 2025-03-04 RX ORDER — MULTIVIT WITH MINERALS/LUTEIN
800 TABLET ORAL DAILY
COMMUNITY

## 2025-03-04 NOTE — PATIENT INSTRUCTIONS
"Recommendations from today's MTM visit:                                                    MTM (medication therapy management) is a service provided by a clinical pharmacist designed to help you get the most of out of your medicines.   Today we reviewed what your medicines are for, how to know if they are working, that your medicines are safe and how to make your medicine regimen as easy as possible.      No changes today    Follow-up: in 1 year, or sooner if needed    It was great speaking with you today.  I value your experience and would be very thankful for your time in providing feedback in our clinic survey. In the next few days, you may receive an email or text message from [email or web address] with a link to a survey related to your  clinical pharmacist.\"     To schedule another MTM appointment, please call the clinic directly or you may call the MTM scheduling line at [phone] or toll-free at [phone].     My Clinical Pharmacist's contact information:                                                      Please feel free to contact me with any questions or concerns you have.      Ankur Rivers (Hailie), BalaD, MPH  Medication Therapy Management Pharmacist    "

## 2025-03-11 ENCOUNTER — THERAPY VISIT (OUTPATIENT)
Dept: PHYSICAL THERAPY | Facility: CLINIC | Age: 55
End: 2025-03-11
Payer: COMMERCIAL

## 2025-03-11 DIAGNOSIS — M19.012 DEGENERATIVE ARTHRITIS OF LEFT SHOULDER REGION: Primary | ICD-10-CM

## 2025-03-11 PROCEDURE — 97110 THERAPEUTIC EXERCISES: CPT | Mod: GP | Performed by: PHYSICAL THERAPIST

## 2025-03-18 ENCOUNTER — THERAPY VISIT (OUTPATIENT)
Dept: PHYSICAL THERAPY | Facility: CLINIC | Age: 55
End: 2025-03-18
Payer: COMMERCIAL

## 2025-03-18 DIAGNOSIS — M19.012 DEGENERATIVE ARTHRITIS OF LEFT SHOULDER REGION: Primary | ICD-10-CM

## 2025-03-18 DIAGNOSIS — Z96.612 S/P SHOULDER REPLACEMENT, LEFT: Primary | ICD-10-CM

## 2025-03-18 DIAGNOSIS — M19.012 PRIMARY OSTEOARTHRITIS OF LEFT SHOULDER: ICD-10-CM

## 2025-03-18 PROCEDURE — 97110 THERAPEUTIC EXERCISES: CPT | Mod: GP | Performed by: PHYSICAL THERAPIST

## 2025-03-25 ENCOUNTER — THERAPY VISIT (OUTPATIENT)
Dept: PHYSICAL THERAPY | Facility: CLINIC | Age: 55
End: 2025-03-25
Payer: COMMERCIAL

## 2025-03-25 DIAGNOSIS — M19.012 DEGENERATIVE ARTHRITIS OF LEFT SHOULDER REGION: Primary | ICD-10-CM

## 2025-03-25 PROCEDURE — 97110 THERAPEUTIC EXERCISES: CPT | Mod: GP | Performed by: PHYSICAL THERAPIST

## 2025-03-25 NOTE — PROGRESS NOTES
PLAN  Continue therapy per current plan of care. Making good progress 7 weeks out post L TSA and biceps tenodesis     03/25/25 0500   Appointment Info   Signing clinician's name / credentials Willem Gustafson, PT, DPT   Total/Authorized Visits ET   Visits Used 7   Medical Diagnosis Left shoulder pain   PT Tx Diagnosis left shoulder pain, aftercare s/p replacement of left shoulder   Precautions/Limitations Biceps tenodesis left   Progress Note/Certification   Start of Care Date 02/11/25   Onset of illness/injury or Date of Surgery 02/04/25   Therapy Frequency 2x week for 5 week   Predicted Duration 1 x week for 7 weeks   Certification date from 02/11/25   Certification date to 05/06/25   Progress Note Due Date 05/06/25   Progress Note Completed Date 02/11/25   GOALS   PT Goals 2   PT Goal 1   Goal Identifier Reaching   Goal Description Pt will be able to reach overhead, out to the side, behind the back and cross body pain free in order to reach cabinets, for dressing, and ADls   Rationale to maximize safety and independence with performance of ADLs and functional tasks;to maximize safety and independence within the home;to maximize safety and independence within the community;to maximize safety and independence with transportation;to maximize safety and independence with self cares   Goal Progress improving   Target Date 05/06/25   PT Goal 2   Goal Identifier Lifting   Goal Description Pt will be able to lift > 5 pounds overhead pain free in order to place things into high spaces with ADLs at home, and perform daily tasks at home   Rationale to maximize safety and independence with performance of ADLs and functional tasks;to maximize safety and independence within the home;to maximize safety and independence within the community;to maximize safety and independence with transportation;to maximize safety and independence with self cares   Goal Progress improving   Target Date 05/06/25   Subjective Report   Subjective  Report 7 weeks out now. Returns to Dr. Dixon tomorrow for post op check up. Pt reports that she's contining to improve   Objective Measures   Objective Measures Objective Measure 1   Objective Measure 1   Objective Measure PROM   Details Flexion 125, ER in 90 abduction 35, ER in 30 abduction 55, IR in 30 abduction 50, Abudction 90   Treatment Interventions (PT)   Interventions Therapeutic Procedure/Exercise   Therapeutic Procedure/Exercise   Therapeutic Procedures: strength, endurance, ROM, flexibility minutes (74427) 38   Therapeutic Procedures Ther Proc 2;Ther Proc 3   Ther Proc 1 Education   Ther Proc 1 - Details Established POC, discussed MARTHA/PHYS of pain, HEP frequency, and activity mod   Ther Proc 2 education   Ther Proc 2 - Details discussion biceps precautions, taking sling off every hour or so   Ther Proc 3 PROM   Ther Proc 3 - Details x 15 min all planes   PTRx Ther Proc 1 Scapular Retraction/Depression   PTRx Ther Proc 1 - Details HEP   PTRx Ther Proc 2 Pendulum/Codmans   PTRx Ther Proc 2 - Details NT   PTRx Ther Proc 3 Elbow Active Range of Motion Flexion in Supination   PTRx Ther Proc 3 - Details progressed to 2 pound x 20   PTRx Ther Proc 4 Rowing with Shoulders Up and Back   PTRx Ther Proc 4 - Details progressed to Blue TB x 10 with 5 sec holds    PTRx Ther Proc 5 Wand Shoulder External Rotation in Standing   PTRx Ther Proc 5 - Details x 10 with 5 sec holds tolerates well    PTRx Ther Proc 6 Standing Passive Shoulder Flexion   PTRx Ther Proc 6 - Details x 5    PTRx Ther Proc 7 Supine Wand ER at 45 Degrees Shoulder Abduction   PTRx Ther Proc 7 - Details x 20L with 5 sec holds   PTRx Ther Proc 8 Pulley Shoulder Flexion   PTRx Ther Proc 8 - Details x 5 min flexion and abduction   PTRx Ther Proc 9 Wand Shoulder Flexion in Standing   PTRx Ther Proc 9 - Details x 10 with 5 sec holds   PTRx Ther Proc 10 Wand Shoulder Abduction Standing   PTRx Ther Proc 10 - Details x 10L with 5 sec holds    Skilled  Intervention Cues for form and control throughout   Patient Response/Progress Tolerated initial HEP well   PTRx Ther Proc 11 Supine AAROM Shoulder Flexion   PTRx Ther Proc 11 - Details x 10L   PTRx Ther Proc 12 Push-Up Plus At Wall   PTRx Ther Proc 12 - Details x 20 at wall    Neuromuscular Re-education   PTRx Neuro Re-ed 1 Scapular Retraction/Depression   PTRx Neuro Re-ed 1 - Details No Notes   Education   Learner/Method Patient;No Barriers to Learning   Education Comments no concerns   Plan   Home program Ptrx HEP   Plan for next session Re-assess, progress as able   Total Session Time   Timed Code Treatment Minutes 38   Total Treatment Time (sum of timed and untimed services) 38       Beginning/End Dates of Progress Note Reporting Period:  02/11/25 to 03/25/2025    Referring Provider:  Nataliia Dixon

## 2025-03-26 ENCOUNTER — OFFICE VISIT (OUTPATIENT)
Dept: ORTHOPEDICS | Facility: CLINIC | Age: 55
End: 2025-03-26
Payer: COMMERCIAL

## 2025-03-26 ENCOUNTER — ANCILLARY PROCEDURE (OUTPATIENT)
Dept: GENERAL RADIOLOGY | Facility: CLINIC | Age: 55
End: 2025-03-26
Attending: ORTHOPAEDIC SURGERY
Payer: COMMERCIAL

## 2025-03-26 DIAGNOSIS — M19.012 PRIMARY OSTEOARTHRITIS OF LEFT SHOULDER: ICD-10-CM

## 2025-03-26 DIAGNOSIS — Z96.612 S/P SHOULDER REPLACEMENT, LEFT: ICD-10-CM

## 2025-03-26 DIAGNOSIS — Z96.612 S/P SHOULDER REPLACEMENT, LEFT: Primary | ICD-10-CM

## 2025-03-26 PROCEDURE — 1125F AMNT PAIN NOTED PAIN PRSNT: CPT | Performed by: ORTHOPAEDIC SURGERY

## 2025-03-26 PROCEDURE — 73030 X-RAY EXAM OF SHOULDER: CPT | Mod: LT | Performed by: RADIOLOGY

## 2025-03-26 PROCEDURE — 99024 POSTOP FOLLOW-UP VISIT: CPT | Performed by: ORTHOPAEDIC SURGERY

## 2025-03-26 NOTE — LETTER
3/26/2025      Zayra Ramirez  [address] [place] [address]      Dear Colleague,    Thank you for referring your patient, Zayra Ramirez, to the [facility] [place]. Please see a copy of my visit note below.    CHIEF COMPLAINT: Status post left total shoulder arthroplasty    DATE OF SURGERY: 2/4/2025    HISTORY OF PRESENT ILLNESS: Zayra is an extremely pleasant 54 year-old right hand dominant female who is 6 weeks status post the above procedure.  Reports minimal pain, only taking Tylenol at this point.  Started physical therapy.  Has been compliant with the sling. Patient states she uses the sling all the time except in the car and being active, per Physical therapist instructions.   SANE R - 100 L - 30    EXAM:  Pleasant adult 54 year old in no distress.  Respirations even and unlabored.  Left upper extremity: Incision healed. No erythema. No drainage. Sens intact Ax/Musc/Med/Rad/Uln nerves. Motor intact EPL, FPL, and Intrinsics. Shoulder range of motion not tested due to postop restrictions.  Able to resist subscap strength    DATA REVIEW:  AP, scapular Y, axillary xrays of the left shoulder were ordered and reviewed today showing total shoulder arthroplasty with no acute postoperative complication    ASSESSMENT:  1. 6 weeks status post above procedure    PLAN:  I reviewed the plan with the patient for weaning out of the sling.  We did discuss progressing physical therapy to active range of motion.  Discussed restrictions again including and pushing up from a chair to protect the subscap repair.  We discussed continued icing, over-the-counter analgesics as needed.  Follow-up in another 6 weeks with repeat x-rays.        Nataliia Plascencia  Orthopedic Surgery Sports Medicine and Shoulder Surgery      Again, thank you for allowing me to participate in the care of your patient.        Sincerely,        NATALIIA PLASCENCIA MD    Electronically signed

## 2025-04-01 ENCOUNTER — THERAPY VISIT (OUTPATIENT)
Dept: PHYSICAL THERAPY | Facility: CLINIC | Age: 55
End: 2025-04-01
Payer: COMMERCIAL

## 2025-04-01 DIAGNOSIS — M19.012 DEGENERATIVE ARTHRITIS OF LEFT SHOULDER REGION: Primary | ICD-10-CM

## 2025-04-01 PROCEDURE — 97110 THERAPEUTIC EXERCISES: CPT | Mod: GP | Performed by: PHYSICAL THERAPIST

## 2025-04-03 ENCOUNTER — TRANSFERRED RECORDS (OUTPATIENT)
Dept: HEALTH INFORMATION MANAGEMENT | Facility: CLINIC | Age: 55
End: 2025-04-03
Payer: COMMERCIAL

## 2025-04-08 ENCOUNTER — THERAPY VISIT (OUTPATIENT)
Dept: PHYSICAL THERAPY | Facility: CLINIC | Age: 55
End: 2025-04-08
Payer: COMMERCIAL

## 2025-04-08 DIAGNOSIS — M19.012 DEGENERATIVE ARTHRITIS OF LEFT SHOULDER REGION: Primary | ICD-10-CM

## 2025-04-08 PROCEDURE — 97110 THERAPEUTIC EXERCISES: CPT | Mod: GP | Performed by: PHYSICAL THERAPIST

## 2025-04-09 NOTE — PROGRESS NOTES
[facility]  Rheumatology Clinic  Panchito Saenz MD  04/10/2025     Name: Zayra Ramirez  MRN: [id number]  Age: 55 year old  : 1970  Referring provider: Aime Mason    Assessment and Plan:  # Seronegative inflammatory arthritis:  Daily joint pain and widespread morning stiffness have improved and have not been present for several months.  Exam shows no synovial thickening, and resolution of former tenderness in knuckles fingers toes or mid feet.    Data: 2025, comprehensive metabolic panel was normal; CRP normal; CBC normal; cervical spine CT 2025 showed multilevel cervical spondylosis with postsurgical instrumentation Tatian changes  In 2024,hepatitis C and HIV were negative.     Discussion: Patient relates reduction in persistent morning stiffness and small joint predominant aching since last visit.  Inflammatory arthritis is under good control with current therapy.  I recommend checking CBC, creatinine.  I recommend remaining off methotrexate for now and continuing hydroxychloroquine.  Combination therapy with anti-TNF could be contemplated for recurrence of inflammatory polyarthritis.    # Degenerative arthritis, neck and lumbar spine: Chronic low back pain is markedly improved after lumbar fusion surgery in 2022 by Dr. Krishnan.  Discussions are ongoing regarding demonstrated degenerative disc disease in the cervical spine and possible surgical solutions for intermittent radicular pain.    # DJD left shoulder: Status post left humeral prosthesis placement in early , symptoms much improved.    # Tobacco abuse:   Patient quit smoking in early     We discussed today in clinic:    Diagnosis:  1.  Inflammatory arthritis: Small joint symptoms in the hands and feet remain improved, and morning stiffness is less. I recommend checking blood work today, and continuing hydroxychloroquine.  2.  Osteoarthritis left left more so than right shoulder: Pain much improved after  "shoulder replacement in January 2025  4.  Pseudogout, right shoulder: No recurrence of inflammatory symptoms.    Plan:  1. Continue hydroxychloroquine 200 mg twice a day.While using hydroxychloroquine, undergo annual examination of the retina for rare retinal toxicity.  First ophthalmology visit may occur before next visit in 6 to 7 months  2.  Check creatinine, complete blood count today and every 6 months while using hydroxychloroquine.  3.  Use acetaminophen 1000 mg every 8 hours for joint pain; reduce celecoxib to once daily dosing, or to dosing 3 days/week.  Give progress report to rheumatology with reduced dose of celecoxib.    Panchito Saenz MD  Staff Rheumatologist, [facility]    On the day of the encounter, a total of 31 minutes was spent in chart review, and in counseling and coordination of care, regarding the patient's complex medical problem of degenerative joint disease, left greater than right shoulders osteoarthritis versus inflammatory arthritis hands, high risk medication.    The longitudinal plan of care for the diagnosis(es)/condition(s) as documented were addressed during this visit. Due to the added complexity in care, I will continue to support Zayra in the subsequent management and with ongoing continuity of care.    No orders of the defined types were placed in this encounter.          HPI:   Zayra Ramirez follows up for seronegative inflammatory arthritis and L shoulder pain.  She was last seen September 2024, when seronegative inflammatory arthritis was improved.  I recommended continued use of hydroxychloroquine, and addition of celecoxib for residual joint pain on a as needed basis.    Interval history April 10, 2025    She reports \"greater than 90%\" relief from left shoulder pain after left total shoulder performed in January 2025.    Other joints are doing well, with mild to moderate aching pain.  Morning stiffness is approximately 60 minutes in duration, but is mild in " severity.  She notes minimal swelling, redness, warmth associated with fingers, hands, wrists, ankles, or feet.  No restriction in limited activities of daily living (limitation due to shoulder), including gardening.    Continues hydroxychloroquine 400 mg daily.  No adverse effects.    She stopped Celebrex around the time of shoulder surgery in January, but restarted it in the last month and has been using Celebrex twice daily rather than as needed.  She continues Tylenol 3 times daily, as recommended from her orthopedic provider.      Interval history September 26, 2024    Patient saw Dr. Haas in sports medicine on September 13, 2024.  Impression was of bilateral shoulder pain; ultrasound-guided intra-articular injection in the left shoulder was given.  Recommendation was to continue physical therapy and follow-up with spine surgery for cervical pain and radiculopathy symptoms.    She notes increasing finger swelling and stiffness over the last few months. Equal pain on both sides. Pain worse in the mornings, improved after 30-60 min early morning stiffness.Repetitive activity does not affect the pain much.    L > R  shoulder pain has worsened.    Hydroxychloroquine 400 mg daily has continued since last October.  It is well-tolerated.        Interval history October 12, 2023    Patient relates gradual return of achy pain in left greater than right shoulder, both knees, both ankles, as well as ankles and wrists to a lesser extent..  There is no visible swelling in hands or feet, but in the mornings, joints are stiff for at least 1 hour.  Pain is relieved modestly by celecoxib, acetaminophen does not provide much relief.    She has ongoing right shoulder and shooting pain down into the hand, she attributes to nerve pinching in the right side of the neck.  She will address this with Dr. Ballard.    She stopped methotrexate approximately 2 years ago prior to back surgery (which was very successful; she is doing  "very well with regard to her lower back.) and has not restarted.  She did not start hydroxychloroquine after the last visit, for unclear reasons, she did not receive communication about the Plaquenil recommendation.  Interval history February 28, 2023    Patient was seen by Dr. Wilhelm in orthopedics on February 16, 2023 in follow-up of spinal fusion surgery performed in November 2022.  Impression was of satisfactory postoperative recovery, off opioids.  Doing well with regards to her lower back.  Continued right sided radicular arm pain was noted.  Concern was for left-sided cervical neuritic symptoms, potentially related to compressive neuropathy.  Recommendation was to continue with nonoperative management and have consultation with pain provider.    She stopped methotrexate before back surgery and started hydroxychloroquine 200 mg tiwce daily. She has continued with hydroxychloroquine.   Today she is doing good with respect to small joint pain.  Her shoulders feel \"tight\" but less painful than last year, comfortable; she does use aceta/ibu each day.  She stil has 1-2 hours of early morning stiffness in small joints, but she is not disabled by the stiffness. Helped by Ptx.    Interval history July 29, 2022    Patient underwent left subacromial injection in rheumatology clinic on Yessenia 3, 2022. She noted signifidcant improvement, with ability to use the L shoulder.     Patient was hospitalized at [facility] from July 13 through July 15, 2022 for pseudogout flareup, and rheumatoid arthritis.  History was of sudden worsening of right shoulder pain on July 12, associated with elevated inflammatory markers and leukocytosis.  Shoulder aspiration was performed in the emergency room and fluid analysis revealed CPPD crystals; she underwent shoulder injection with dramatic improvement, and she was discharged on celebrex.    Since discharge, the R shoulder has been better, able to tolerate gardening and other ADL. " "  Using acetaminophen 3 500 mg twice daily.    She is doing Ptx both for her shoulders and for her back. She is doing exercises at home daily for the R shoulder.     Interval history June 2, 2022    She still has waxing/waning pain in both shoulders, knees, hips. Sieges of pain start in the mornings, \"I can't even get out of bed due to severe shoulder/elbow pain\". Oftentimes it takes an hour to get out of bed, and a total of 2 hours of early morning stiffness is common.  Former pain affecting fingers wrists and toes has lessened somewhat.    Chronic back/neck pain is still a problem. She has seen Dr. Wilhelm, who has discussed low back surgery pending a weight change.     She is still on methotrexate 10 tabs weekly (since 2-2022). She does not think it helps.  She has used several course of prendionse and 2 courses of medrol, last dose 2 weeks ago.   She is \"at least 50%\" better during steroid tapers; symptoms come back within 10 days of stopping. Not using ibuprofen.     Review of Systems:   Pertinent items are noted in HPI or as below, remainder of complete ROS is negative.      No recent problems with hearing or vision. No swallowing problems.   No breathing difficulty, shortness of breath, coughing, or wheezing.  No chest pain or palpitations.  No heart burn, indigestion, abdominal pain, nausea, vomiting, diarrhea.  No urination problems, no bloody, cloudy urine, no dysuria.  No numbing, tingling, weakness.  No headaches or confusion.  No rashes. No easy bleeding or bruising.     Active Medications:   Current Outpatient Medications   Medication Sig Dispense Refill    acetaminophen (TYLENOL) 500 MG tablet Take 1-2 tablets (500-1,000 mg) by mouth every 6 hours as needed for mild pain. 100 tablet 1    albuterol (PROAIR HFA/PROVENTIL HFA/VENTOLIN HFA) 108 (90 Base) MCG/ACT inhaler Inhale 2 puffs into the lungs every 6 hours as needed for shortness of breath / dyspnea or wheezing Ventolin please 18 g 2    albuterol " (PROVENTIL) (2.5 MG/3ML) 0.083% neb solution INHALE 3 ML VIA A NEBULIZER 4 TIMES DAILY IF NEEDED.      ARIPiprazole (ABILIFY) 5 MG tablet Take 5 mg by mouth every morning      aspirin 81 MG EC tablet Take 1 tablet (81 mg) by mouth 2 times daily. 60 tablet 0    azelastine (ASTELIN) 0.1 % nasal spray Spray 2 sprays into both nostrils 2 times daily.      busPIRone HCl (BUSPAR) 30 MG tablet Take 30 mg by mouth 2 times daily       celecoxib (CELEBREX) 100 MG capsule TAKE 1 CAPSULE (100 MG) BY MOUTH 2 TIMES DAILY. AS NEEDED FOR JOINT PAIN 60 capsule 2    cloNIDine (CATAPRES) 0.1 MG tablet Take 1 tablet by mouth daily as needed (anxiety, panic attack).      cyclobenzaprine (FLEXERIL) 10 MG tablet Take 1 tablet (10 mg) by mouth at bedtime. 90 tablet 1    DULoxetine (CYMBALTA) 60 MG capsule Take 120 mg by mouth At Bedtime       Elemental iron 65 mg Vitamin C 125 mg (VITRON C)  MG TABS tablet Take 1 tablet by mouth daily.      EPINEPHrine (ANY BX GENERIC EQUIV) 0.3 MG/0.3ML injection 2-pack Inject 0.3 mLs (0.3 mg) into the muscle as needed for anaphylaxis May repeat one time in 5-15 minutes if response to initial dose is inadequate. 2 each 1    hydroxychloroquine (PLAQUENIL) 200 MG tablet Take 1 tablet (200 mg) by mouth 2 times daily 180 tablet 3    insulin pen needle (32G X 4 MM) 32G X 4 MM miscellaneous Use 1 NEEDLE WEEKLY 12 each 3    montelukast (SINGULAIR) 10 MG tablet Take 1 tablet by mouth daily.      naloxone (NARCAN) 4 MG/0.1ML nasal spray Spray 1 spray (4 mg) into one nostril alternating nostrils as needed for opioid reversal every 2-3 minutes until assistance arrives 0.2 mL 0    omeprazole (PRILOSEC) 40 MG DR capsule Take 1 capsule (40 mg) by mouth daily. 90 capsule 3    OXYGEN-HELIUM IN 3L @ night    Oxygen only not helium      pregabalin (LYRICA) 150 MG capsule Take 1 capsule by mouth in the morning and around 1 PM 60 capsule 5    Respiratory Therapy Supplies ([facility] CPAP FILTER) MISC        rOPINIRole (REQUIP) 0.5 MG tablet Take 1 tablet (0.5 mg) by mouth at bedtime. 90 tablet 1    senna-docusate (SENOKOT-S/PERICOLACE) 8.6-50 MG tablet Take 1-2 tablets by mouth 2 times daily. Take while on oral narcotics to prevent or treat constipation. 30 tablet 0    TRELEGY ELLIPTA 100-62.5-25 MCG/ACT oral inhaler Inhale 1 puff into the lungs daily.      vitamin D3 (CHOLECALCIFEROL) 50 mcg (2000 units) tablet Take 50 mcg by mouth daily.      vitamin E (TOCOPHEROL) 400 units (180 mg) capsule Take 800 Units by mouth daily.      ondansetron (ZOFRAN ODT) 4 MG ODT tab Take 1 tablet (4 mg) by mouth every 8 hours as needed for nausea. Dissolve ON the tongue. (Patient not taking: Reported on 4/10/2025) 10 tablet 0    oxyCODONE (ROXICODONE) 5 MG tablet Take 1-2 tablets (5-10 mg) by mouth every 6 hours as needed for moderate to severe pain. (Patient not taking: Reported on 4/10/2025) 10 tablet 0     No current facility-administered medications for this visit.     Facility-Administered Medications Ordered in Other Visits   Medication Dose Route Frequency Provider Last Rate Last Admin    Lidocaine 1 % injection 0.5-5 mL  0.5-5 mL Other Once PRN Gutierrez Candelario MD        sodium chloride (PF) 0.9% PF flush 5-50 mL  5-50 mL Intracatheter Once PRN Gutierrez Candelario MD               Allergies:   Bee Venom       Bees      Doxycycline       Erythromycin     Hydrocodone-Acetaminophen                 Past Medical History:  Remarkable for moderate, persistent asthma, hypertension, bipolar II disorder, interstitial lung disease, cervical stenosis with myelopathy 2017.       Chronic midline low back pain  Facial cellulitis  Morbid (severe) obesity due to excess calories  Dental abscess  Hypoxia  Subcutaneous emphysema  Borderline personality disorder  Stenosis of cervical spine with myelopathy  Esophageal stricture  Gastroesophageal reflux disease   Hypertension   Interstitial lung disease  Moderate persistent asthma without  complication  Onychomycosis  Restless leg syndrome  Seasonal allergies  Smoker  Stress  Respiratory bronchiolitis interstitial lung disease     Past Surgical History:  Remarkable for surgical fusion  Hysterectomy 1997  rectocele and Cystocele surgery  Cholecystectomy     Family History:    The patient's family history includes Asthma in her mother; Back Pain in her father; Hypertension in her father; Other Cancer in her father.    Social History:  The patient reports that she has been smoking cigarettes, around 0.5 packs per day. She started smoking about 23 years ago. She has a 30.00 pack-year smoking history. She has never used smokeless tobacco. She reports that she does not drink alcohol or use drugs.   PCP: Adam Del Toro  Marital Status: Single     Physical Exam:   BP (!) 154/84 (BP Location: Left arm, Patient Position: Sitting, Cuff Size: Adult Regular)   Pulse 69   Wt 89.8 kg (198 lb)   LMP  (LMP Unknown)   SpO2 94%   BMI 33.99 kg/m     Wt Readings from Last 4 Encounters:   04/10/25 89.8 kg (198 lb)   02/04/25 84 kg (185 lb 3 oz)   01/07/25 85.4 kg (188 lb 4.8 oz)   10/16/24 81.6 kg (180 lb)     Constitutional: Well-developed, appearing stated age; cooperative.  Eyes: Normal EOM, PERRLA, vision, conjunctiva, sclera.  ENT: Normal external ears, nose, hearing, lips, teeth, gums, throat. No mucous membrane lesions.  Neck: No mass or thyroid enlargement.  Resp: Breathing unlabored  MS: Painless forward elevation 120 degrees on the right shoulder..  Left shoulder range of motion not tested today; status post humeral head replacement January 2025; fist formation intact, normal wrist ROM.  No visible swelling at MCPs or PIPs.  MTPs nontender.  Hands: Bony enlargement at multiple PIPs and DIPs.  Also tenderness at scattered over PIPs and MCPs.  Fist formation and complete.  Tenderness at multiple MTPs without palpable synovitis.  Skin: No nail pitting, alopecia, rash, nodules or lesions.  Neuro: Normal  cranial nerves  Psych: Normal judgement, orientation, memory, affect.    Laboratory:       Latest Ref Rng & Units 9/26/2024     2:11 PM 1/7/2025    10:47 AM 2/5/2025     5:44 AM   RHEUM RESULTS   Albumin 3.5 - 5.2 g/dL 3.9  4.1     ALT 0 - 50 U/L 19  15     AST 0 - 45 U/L 28  21     Creatinine 0.51 - 0.95 mg/dL 0.69  0.74     CRP Inflammation <5.00 mg/L 15.80  4.57     GFR Estimate >60 mL/min/1.73m2 >90  >90     Hematocrit 35.0 - 47.0 % 41.1  40.8     Hemoglobin 11.7 - 15.7 g/dL 13.7  13.3  12.8    WBC 4.0 - 11.0 10e3/uL 6.5  5.6     RBC Count 3.80 - 5.20 10e6/uL 4.72  4.43     RDW 10.0 - 15.0 % 15.5  14.5     MCHC 31.5 - 36.5 g/dL 33.3  32.6     MCV 78 - 100 fL 87  92     Platelet Count 150 - 450 10e3/uL 219  218     Sed Rate 0 - 30 mm/hr 14

## 2025-04-10 ENCOUNTER — OFFICE VISIT (OUTPATIENT)
Dept: RHEUMATOLOGY | Facility: CLINIC | Age: 55
End: 2025-04-10
Payer: COMMERCIAL

## 2025-04-10 VITALS
DIASTOLIC BLOOD PRESSURE: 84 MMHG | WEIGHT: 198 LBS | SYSTOLIC BLOOD PRESSURE: 154 MMHG | HEART RATE: 69 BPM | BODY MASS INDEX: 33.99 KG/M2 | OXYGEN SATURATION: 94 %

## 2025-04-10 DIAGNOSIS — M19.90 INFLAMMATORY ARTHRITIS: Primary | ICD-10-CM

## 2025-04-10 NOTE — PATIENT INSTRUCTIONS
Diagnosis:  1.  Inflammatory arthritis: Small joint symptoms in the hands and feet remain improved, and morning stiffness is less. I recommend checking blood work today, and continuing hydroxychloroquine.  2.  Osteoarthritis left left more so than right shoulder: Pain much improved after shoulder replacement in January 2025  4.  Pseudogout, right shoulder: No recurrence of inflammatory symptoms.    Plan:  1. Continue hydroxychloroquine 200 mg twice a day.While using hydroxychloroquine, undergo annual examination of the retina for rare retinal toxicity.  First ophthalmology visit may occur before next visit in 6 to 7 months  2.  Check creatinine, complete blood count today and every 6 months while using hydroxychloroquine.  3.  Use acetaminophen 1000 mg every 8 hours for joint pain; reduce celecoxib to once daily dosing, or to dosing 3 days/week.  Give progress report to rheumatology with reduced dose of celecoxib.

## 2025-04-15 ENCOUNTER — THERAPY VISIT (OUTPATIENT)
Dept: PHYSICAL THERAPY | Facility: CLINIC | Age: 55
End: 2025-04-15
Payer: COMMERCIAL

## 2025-04-15 DIAGNOSIS — M19.012 DEGENERATIVE ARTHRITIS OF LEFT SHOULDER REGION: Primary | ICD-10-CM

## 2025-04-15 PROCEDURE — 97110 THERAPEUTIC EXERCISES: CPT | Mod: GP | Performed by: PHYSICAL THERAPIST

## 2025-04-22 ENCOUNTER — THERAPY VISIT (OUTPATIENT)
Dept: PHYSICAL THERAPY | Facility: CLINIC | Age: 55
End: 2025-04-22
Payer: COMMERCIAL

## 2025-04-22 ENCOUNTER — MYC MEDICAL ADVICE (OUTPATIENT)
Dept: ORTHOPEDICS | Facility: CLINIC | Age: 55
End: 2025-04-22

## 2025-04-22 DIAGNOSIS — M19.012 DEGENERATIVE ARTHRITIS OF LEFT SHOULDER REGION: Primary | ICD-10-CM

## 2025-04-22 PROCEDURE — 97110 THERAPEUTIC EXERCISES: CPT | Mod: GP | Performed by: PHYSICAL THERAPIST

## 2025-04-24 DIAGNOSIS — Z96.612 S/P SHOULDER REPLACEMENT, LEFT: Primary | ICD-10-CM

## 2025-04-24 NOTE — PROGRESS NOTES
CHIEF COMPLAINT: Status post left total shoulder arthroplasty    DATE OF SURGERY: 2/4/2025    HISTORY OF PRESENT ILLNESS: Zayra is an extremely pleasant 54 year-old right hand dominant female who is 12 weeks status post the above procedure. She has intermittent pain and difficulty with physical therapy. She reports she fell a week and a half ago, but did not injury her shoulder, and this has caused her to slow down lately. She will take Tylenol if it bothers her.  Feels like pain is substantially improved compared to before surgery  SANE R - 100 L - 90    EXAM:  Pleasant adult 54 year old in no distress.  Respirations even and unlabored.  Left upper extremity: Incision healed. No erythema. No drainage. Sens intact Ax/Musc/Med/Rad/Uln nerves. Motor intact EPL, FPL, and Intrinsics.  Active forward elevation to 100 degrees, passively to 130, abduction to 90 degrees, external rotation of 20 degrees, passively to 40, internal rotation of the lower lumbar level.  Has well-maintained rotator cuff strength    DATA REVIEW:  AP, scapular Y, axillary xrays of the left shoulder were ordered and reviewed today showing total shoulder arthroplasty with no acute postoperative complication    ASSESSMENT:  1. 12 weeks status post above procedure    PLAN:  I discussed with the patient that she is a bit stiff.  I am going to send a Medrol Dosepak to her pharmacy to help with this.  Patient is going to continue with physical therapy and home exercises.  We discussed follow-up in 9 months with repeat x-rays, sooner for any issues.        Nataliia Dixon  Orthopedic Surgery Sports Medicine and Shoulder Surgery

## 2025-04-25 NOTE — TELEPHONE ENCOUNTER
See previous My Chart message from pt. & my reply.   I called & verified pt stated not having any symptoms or back pain.  I told pt that  reviewed imaging & report in care everywhere of CT chest 4-22-5 at [facility] ordered by pulmonary provider shows Thoracic DDD & stated since pt is not having any symptoms, does not need an appt & can just monitor in future ,  but if pt.wants to be seen, should have  an in person appt with EOS XR & pt agreed.  Made appt.  Call back prn.    V.O.R.B./Rosy Hunter RN.

## 2025-04-28 DIAGNOSIS — Z87.39 HISTORY OF SERONEGATIVE INFLAMMATORY ARTHRITIS: ICD-10-CM

## 2025-04-29 ENCOUNTER — THERAPY VISIT (OUTPATIENT)
Dept: PHYSICAL THERAPY | Facility: CLINIC | Age: 55
End: 2025-04-29
Payer: COMMERCIAL

## 2025-04-29 DIAGNOSIS — M19.012 DEGENERATIVE ARTHRITIS OF LEFT SHOULDER REGION: Primary | ICD-10-CM

## 2025-04-29 PROCEDURE — 97110 THERAPEUTIC EXERCISES: CPT | Mod: GP | Performed by: PHYSICAL THERAPIST

## 2025-04-29 RX ORDER — CELECOXIB 100 MG/1
100 CAPSULE ORAL 2 TIMES DAILY
Qty: 60 CAPSULE | Refills: 1 | Status: SHIPPED | OUTPATIENT
Start: 2025-04-29

## 2025-04-29 NOTE — TELEPHONE ENCOUNTER
Last Written Prescription:  celecoxib (CELEBREX) 100 MG capsule  100 mg, 2 TIMES DAILY           Summary: TAKE 1 CAPSULE (100 MG) BY MOUTH 2 TIMES DAILY. AS NEEDED FOR JOINT PAIN, Disp-60 capsule, R-2, E-Prescribe  Dose, Route, Frequency: 100 mg, Oral, 2 TIMES DAILYStart: 01/31/2025Ord/Sold: 01/31/2025 (O)Ordered On: 01/31/2025Pharm[id number] [place] [facility] [address] [facility]: Taking: Long-term: Med Note:              Patient Sig: TAKE 1 CAPSULE (100 MG) BY MOUTH 2 TIMES DAILY. AS NEEDED FOR JOINT PAIN  Ordering Department:  RHEUMATOLOGY  Authorized By: Panchito Saenz MD  Dispense: 60 capsule  Refills: 2 ordered       ----------------------  Last Visit Date: 04/10/2025 Office Visit Rheumatology - NADINE Saenz  Future Visit Date: 11/13/25  ----------------------      Refill decision: Medication refilled per  Medication Refill in Ambulatory Care  policy.   []  If no future appointment scheduled: Route to Clinic Coordinators to contact the pt for appointment.      Refill decision: Medication unable to be refilled by RN due to: Review Needed: New med; Med adjusted within <= 30 days; Safety Alert; Lab monitoring required    Per office note 4/10/25: 3. Use acetaminophen 1000 mg every 8 hours for joint pain; reduce celecoxib to once daily dosing, or to dosing 3 days/week. Give progress report to rheumatology with reduced dose of celecoxib.   Was filled # 60 with 2 RF on 1/31/25  Provider to review    Request from pharmacy:  Requested Prescriptions   Pending Prescriptions Disp Refills    celecoxib (CELEBREX) 100 MG capsule [Pharmacy Med Name: CELECOXIB 100 MG CAPSULE] 60 capsule 2     Sig: TAKE 1 CAPSULE (100 MG) BY MOUTH 2 TIMES DAILY. AS NEEDED FOR JOINT PAIN       NSAID Medications Failed - 4/29/2025  3:24 PM        Failed - Most recent blood pressure under 140/90 in past 12 months     BP Readings from Last 3 Encounters:   04/10/25 (!) 154/84   02/05/25 131/86   01/07/25  128/82       No data recorded            Failed - Always Fail Criteria - Chart Review Required     Validate Diagnosis. If the medication is requested for an acute flare of a chronic pain associated with a musculoskeletal or rheumatologic condition; okay to authorize if all other criteria are met. If not, then forward to provider for review.          Passed - Patient is age 6-64 years        Passed - Normal CBC on file in past 12 months     Recent Labs   Lab Test 04/11/25  1017   WBC 8.0   RBC 4.59   HGB 13.7   HCT 42.1                    Passed - Medication is active on med list and the sig matches. RN to manually verify dose and sig if red X/fail.     If the protocol passes (green check), you do not need to verify med dose and sig.    A prescription matches if they are the same clinical intention.    For Example: once daily and every morning are the same.    The protocol can not identify upper and lower case letters as matching and will fail.     For Example: Take 1 tablet (50 mg) by mouth daily     TAKE 1 TABLET (50 MG) BY MOUTH DAILY    For all fails (red x), verify dose and sig.    If the refill does match what is on file, the RN can still proceed to approve the refill request.       If they do not match, route to the appropriate provider.             Passed - Normal GFR on file in past 12 months     Recent Labs   Lab Test 04/11/25  1017 08/31/21  1702 07/10/21  1117   GFRESTIMATED >90   < > 65   GFRESTBLACK  --   --  75    < > = values in this interval not displayed.             Passed - Recent (12 mo) or future (90 days) visit within the authorizing provider's specialty     The patient must have completed an in-person or virtual visit within the past 12 months or has a future visit scheduled within the next 90 days with the authorizing provider s specialty.  Urgent care and e-visits do not qualify as an office visit for this protocol.          Passed - No active pregnancy on record        Passed - No  positive pregnancy test in past 12 months         Sumaya B, RN  [facility] Flag Triage & Med Refill Team

## 2025-04-29 NOTE — PROGRESS NOTES
PLAN  12 weeks out s/p TSA left shoulder and biceps tenodesis, making gradual progress towards PT goals with progressing PROM and now slow AROM progress, will continue PT 2 x week for 12 weeks.      04/29/25 0500   Appointment Info   Signing clinician's name / credentials Willem Gustafson, PT, DPT   Total/Authorized Visits ET   Visits Used 12   Medical Diagnosis Left shoulder pain   PT Tx Diagnosis left shoulder pain, aftercare s/p replacement of left shoulder   Precautions/Limitations Biceps tenodesis left   Other pertinent information patient needed to leave early for other appt   Progress Note/Certification   Start of Care Date 02/11/25   Onset of illness/injury or Date of Surgery 02/04/25   Therapy Frequency 2x week for 5 week   Predicted Duration 1 x week for 7 weeks   Certification date from 02/11/25   Certification date to 05/06/25   Progress Note Due Date 05/06/25   Progress Note Completed Date 02/11/25   GOALS   PT Goals 2   PT Goal 1   Goal Identifier Reaching   Goal Description Pt will be able to reach overhead, out to the side, behind the back and cross body pain free in order to reach cabinets, for dressing, and ADls   Rationale to maximize safety and independence with performance of ADLs and functional tasks;to maximize safety and independence within the home;to maximize safety and independence within the community;to maximize safety and independence with transportation;to maximize safety and independence with self cares   Goal Progress improving   Target Date 05/06/25   PT Goal 2   Goal Identifier Lifting   Goal Description Pt will be able to lift > 5 pounds overhead pain free in order to place things into high spaces with ADLs at home, and perform daily tasks at home   Rationale to maximize safety and independence with performance of ADLs and functional tasks;to maximize safety and independence within the home;to maximize safety and independence within the community;to maximize safety and  independence with transportation;to maximize safety and independence with self cares   Goal Progress improving   Target Date 05/06/25   Subjective Report   Subjective Report Pt reports that she's a bit sore today. Did fall onto the otheside and something doesn't feel right on the left shoulder today.   Objective Measures   Objective Measures Objective Measure 1;Objective Measure 2   Objective Measure 1   Objective Measure PROMA   Details Flexion 120, ER in 90 abduction 70, ER in 30 abduction 50, IR in 30 abduction 45   Objective Measure 2   Objective Measure AROM   Details Flexion 85, Abduction 80, ER 35   Treatment Interventions (PT)   Interventions Therapeutic Procedure/Exercise   Therapeutic Procedure/Exercise   Therapeutic Procedures: strength, endurance, ROM, flexibility minutes (46464) 40   Therapeutic Procedures Ther Proc 2;Ther Proc 3   Ther Proc 1 Education   Ther Proc 1 - Details Established POC, discussed MARTHA/PHYS of pain, HEP frequency, and activity mod   Ther Proc 2 education   Ther Proc 2 - Details discussion biceps precautions, taking sling off every hour or so   Ther Proc 3 PROM   Ther Proc 3 - Details x 20 min all planes   PTRx Ther Proc 1 Scapular Retraction/Depression   PTRx Ther Proc 1 - Details HEP   PTRx Ther Proc 2 Pendulum/Codmans   PTRx Ther Proc 2 - Details NT   PTRx Ther Proc 3 Elbow Active Range of Motion Flexion in Supination   PTRx Ther Proc 3 - Details HEP   PTRx Ther Proc 4 Rowing with Shoulders Up and Back   PTRx Ther Proc 4 - Details  Blue TB x 10 with 5 sec holds    PTRx Ther Proc 5 Wand Shoulder External Rotation in Standing   PTRx Ther Proc 5 - Details x 10 with 5 sec holds   PTRx Ther Proc 6 Standing Passive Shoulder Flexion   PTRx Ther Proc 6 - Details NT   PTRx Ther Proc 7 Supine Wand ER at 45 Degrees Shoulder Abduction   PTRx Ther Proc 7 - Details x 20L with 5 sec holds   PTRx Ther Proc 8 Pulley Shoulder Flexion   PTRx Ther Proc 8 - Details x 5 min flexion and abduction    PTRx Ther Proc 9 Shoulder Theraband Low Row/Pulldown   PTRx Ther Proc 9 - Details  blue x 10    PTRx Ther Proc 10 Wand Shoulder Flexion in Standing   PTRx Ther Proc 10 - Details x 5L   PTRx Ther Proc 11 Wand Shoulder Abduction Standing   PTRx Ther Proc 11 - Details x 5L    PTRx Ther Proc 12 Supine AAROM Shoulder Flexion   PTRx Ther Proc 12 - Details NT   PTRx Ther Proc 13 Push-Up Plus At Wall   PTRx Ther Proc 13 - Details x 10 partial range at counter today    PTRx Ther Proc 14 Shoulder Scaption Full Can   PTRx Ther Proc 14 - Details x 15 a bit harder today(80 degrees)   PTRx Ther Proc 15 Wand Shoulder Flexion Supine   PTRx Ther Proc 15 - Details x 20 tolerates well    Skilled Intervention Cues for form and control throughout   Patient Response/Progress Tolerated initial HEP well   Neuromuscular Re-education   PTRx Neuro Re-ed 1 Scapular Retraction/Depression   PTRx Neuro Re-ed 1 - Details No Notes   Education   Learner/Method Patient;No Barriers to Learning   Education Comments no concerns   Plan   Home program Ptrx HEP   Plan for next session Re-assess, progress as able   Total Session Time   Timed Code Treatment Minutes 40   Total Treatment Time (sum of timed and untimed services) 40       Beginning/End Dates of Progress Note Reporting Period:  02/11/25 to 04/29/2025    Referring Provider:  Nataliia Dixon

## 2025-05-05 ENCOUNTER — OFFICE VISIT (OUTPATIENT)
Dept: ORTHOPEDICS | Facility: CLINIC | Age: 55
End: 2025-05-05
Payer: COMMERCIAL

## 2025-05-05 ENCOUNTER — ANCILLARY PROCEDURE (OUTPATIENT)
Dept: GENERAL RADIOLOGY | Facility: CLINIC | Age: 55
End: 2025-05-05
Attending: ORTHOPAEDIC SURGERY
Payer: COMMERCIAL

## 2025-05-05 DIAGNOSIS — Z96.612 S/P SHOULDER REPLACEMENT, LEFT: Primary | ICD-10-CM

## 2025-05-05 DIAGNOSIS — Z96.612 S/P SHOULDER REPLACEMENT, LEFT: ICD-10-CM

## 2025-05-05 PROCEDURE — 73030 X-RAY EXAM OF SHOULDER: CPT | Mod: LT | Performed by: RADIOLOGY

## 2025-05-05 RX ORDER — METHYLPREDNISOLONE 4 MG/1
TABLET ORAL
Qty: 21 TABLET | Refills: 0 | Status: SHIPPED | OUTPATIENT
Start: 2025-05-05

## 2025-05-05 NOTE — LETTER
5/5/2025      Zayra Ramirez  [address] [place] [address]      Dear Colleague,    Thank you for referring your patient, Zayra Ramirez, to the [facility]. Please see a copy of my visit note below.    CHIEF COMPLAINT: Status post left total shoulder arthroplasty    DATE OF SURGERY: 2/4/2025    HISTORY OF PRESENT ILLNESS: Zayra is an extremely pleasant 54 year-old right hand dominant female who is 12 weeks status post the above procedure. She has intermittent pain and difficulty with physical therapy. She reports she fell a week and a half ago, but did not injury her shoulder, and this has caused her to slow down lately. She will take Tylenol if it bothers her.  Feels like pain is substantially improved compared to before surgery  SANE R - 100 L - 90    EXAM:  Pleasant adult 54 year old in no distress.  Respirations even and unlabored.  Left upper extremity: Incision healed. No erythema. No drainage. Sens intact Ax/Musc/Med/Rad/Uln nerves. Motor intact EPL, FPL, and Intrinsics.  Active forward elevation to 100 degrees, passively to 130, abduction to 90 degrees, external rotation of 20 degrees, passively to 40, internal rotation of the lower lumbar level.  Has well-maintained rotator cuff strength    DATA REVIEW:  AP, scapular Y, axillary xrays of the left shoulder were ordered and reviewed today showing total shoulder arthroplasty with no acute postoperative complication    ASSESSMENT:  1. 12 weeks status post above procedure    PLAN:  I discussed with the patient that she is a bit stiff.  I am going to send a Medrol Dosepak to her pharmacy to help with this.  Patient is going to continue with physical therapy and home exercises.  We discussed follow-up in 9 months with repeat x-rays, sooner for any issues.        Nataliia Dixon  Orthopedic Surgery Sports Medicine and Shoulder Surgery      Again, thank you for allowing me to participate in the care of your  patient.        Sincerely,        RIGO PLASCENCIA MD    Electronically signed

## 2025-05-13 ENCOUNTER — THERAPY VISIT (OUTPATIENT)
Dept: PHYSICAL THERAPY | Facility: CLINIC | Age: 55
End: 2025-05-13
Payer: COMMERCIAL

## 2025-05-13 DIAGNOSIS — M19.012 DEGENERATIVE ARTHRITIS OF LEFT SHOULDER REGION: Primary | ICD-10-CM

## 2025-05-13 PROCEDURE — 97110 THERAPEUTIC EXERCISES: CPT | Mod: GP | Performed by: PHYSICAL THERAPIST

## 2025-05-13 NOTE — PROGRESS NOTES
[facility]                                                                                   OUTPATIENT PHYSICAL THERAPY    PLAN OF TREATMENT FOR OUTPATIENT REHABILITATION   Patient's Last Name, First Name, Zayra Stern YOB: 1970   Provider's Name   [facility]   Medical Record No.  [id number]     Onset Date: 02/04/25  Start of Care Date: 02/11/25     Medical Diagnosis:  Left shoulder pain      PT Treatment Diagnosis:  left shoulder pain, aftercare s/p replacement of left shoulder Plan of Treatment  Frequency/Duration: every other week/ 2 months    Certification date from 02/11/25 to 05/07/25         See note for plan of treatment details and functional goals     Willem Gustafson PT                         I CERTIFY THE NEED FOR THESE SERVICES FURNISHED UNDER        THIS PLAN OF TREATMENT AND WHILE UNDER MY CARE     (Physician attestation of this document indicates review and certification of the therapy plan).              Referring Provider:  Nataliia Dixon    Initial Assessment  See Epic Evaluation- Start of Care Date: 02/11/25            PLAN  Continue therapy per current plan of care.     05/13/25 0500   Appointment Info   Signing clinician's name / credentials Willem Gustafson PT, DPT   Total/Authorized Visits ET   Visits Used 13   Medical Diagnosis Left shoulder pain   PT Tx Diagnosis left shoulder pain, aftercare s/p replacement of left shoulder   Precautions/Limitations Biceps tenodesis left   Other pertinent information patient needed to leave early for other appt   Progress Note/Certification   Start of Care Date 02/11/25   Onset of illness/injury or Date of Surgery 02/04/25   Therapy Frequency every other week   Predicted Duration 2 months   Certification date from 02/11/25   Certification date to 05/07/25   Progress Note Due Date 07/07/25   Progress Note Completed Date 02/11/25   GOALS   PT Goals 2   PT Goal 1   Goal  Identifier Reaching   Goal Description Pt will be able to reach overhead, out to the side, behind the back and cross body pain free in order to reach cabinets, for dressing, and ADls   Rationale to maximize safety and independence with performance of ADLs and functional tasks;to maximize safety and independence within the home;to maximize safety and independence within the community;to maximize safety and independence with transportation;to maximize safety and independence with self cares   Goal Progress Cont improvement, gradual   Target Date 07/07/25   PT Goal 2   Goal Identifier Lifting   Goal Description Pt will be able to lift > 5 pounds overhead pain free in order to place things into high spaces with ADLs at home, and perform daily tasks at home   Rationale to maximize safety and independence with performance of ADLs and functional tasks;to maximize safety and independence within the home;to maximize safety and independence within the community;to maximize safety and independence with transportation;to maximize safety and independence with self cares   Goal Progress cont improvement, gradual date exended   Target Date 07/07/25   Subjective Report   Subjective Report Shoulder is a little bit better since being put on the steroid   Objective Measures   Objective Measures Objective Measure 1;Objective Measure 2   Objective Measure 1   Objective Measure PROMA   Details Flexion 120, ER in 90 abduction 75, ER in 30 abduction 50, IR in 30 abduction 45   Objective Measure 2   Objective Measure AROM   Details Flexion 95, Abduction 80, ER 45   Treatment Interventions (PT)   Interventions Therapeutic Procedure/Exercise   Therapeutic Procedure/Exercise   Therapeutic Procedures: strength, endurance, ROM, flexibility minutes (83230) 40   Therapeutic Procedures Ther Proc 2;Ther Proc 3   Ther Proc 1 Education   Ther Proc 1 - Details Established POC, discussed MARTHA/PHYS of pain, HEP frequency, and activity mod   Ther Proc 2  education   Ther Proc 2 - Details discussion biceps precautions, taking sling off every hour or so   Ther Proc 3 PROM   Ther Proc 3 - Details x 20 min all planes   PTRx Ther Proc 1 Elbow Active Range of Motion Flexion in Supination   PTRx Ther Proc 1 - Details HEP   PTRx Ther Proc 2 Rowing with Shoulders Up and Back   PTRx Ther Proc 2 - Details blue x 20    PTRx Ther Proc 3 Wand Shoulder External Rotation in Standing   PTRx Ther Proc 3 - Details x 10 with 5 sec holds   PTRx Ther Proc 4 Supine Wand ER at 45 Degrees Shoulder Abduction   PTRx Ther Proc 4 - Details x 20L with 5 sec holds   PTRx Ther Proc 5 Pulley Shoulder Flexion   PTRx Ther Proc 5 - Details x 5 min flexion and abduction   PTRx Ther Proc 6 Shoulder Theraband Low Row/Pulldown   PTRx Ther Proc 6 - Details  blue x 10    PTRx Ther Proc 7 Wand Shoulder Flexion in Standing   PTRx Ther Proc 7 - Details x 15L   PTRx Ther Proc 8 Wand Shoulder Abduction Standing   PTRx Ther Proc 8 - Details x 10L    PTRx Ther Proc 9 Supine AAROM Shoulder Flexion   PTRx Ther Proc 9 - Details NT   PTRx Ther Proc 10 Push-Up Plus At Wall   PTRx Ther Proc 10 - Details x 20 with improved control and depth at wall with feet farther away   PTRx Ther Proc 11 Shoulder Scaption Full Can   PTRx Ther Proc 11 - Details x 20 to 90 degrees improving control overall    PTRx Ther Proc 12 Wand Shoulder Internal Rotation   PTRx Ther Proc 12 - Details x 10 with stick behind tolerates well overall    PTRx Ther Proc 13 Shoulder External Rotation Sidelying   PTRx Ther Proc 13 - Details x 30L good challenge overall    PTRx Ther Proc 14 Shoulder Scaption Full Can   PTRx Ther Proc 14 - Details x 15 a bit harder today(80 degrees)   PTRx Ther Proc 15 Wand Shoulder Flexion Supine   PTRx Ther Proc 15 - Details x 20 tolerates well    Skilled Intervention Cues for form and control throughout   Patient Response/Progress Tolerated initial HEP well   Neuromuscular Re-education   PTRx Neuro Re-ed 1 Scapular  Retraction/Depression   PTRx Neuro Re-ed 1 - Details No Notes   Education   Learner/Method Patient;No Barriers to Learning   Education Comments no concerns   Plan   Home program Ptrx HEP   Plan for next session PN, every other week for 2 months   Total Session Time   Timed Code Treatment Minutes 40   Total Treatment Time (sum of timed and untimed services) 40       Beginning/End Dates of Progress Note Reporting Period:  02/11/25 to 05/13/2025    Referring Provider:  Nataliia Dixon

## 2025-05-19 ENCOUNTER — MYC REFILL (OUTPATIENT)
Dept: INTERNAL MEDICINE | Facility: CLINIC | Age: 55
End: 2025-05-19
Payer: COMMERCIAL

## 2025-05-19 DIAGNOSIS — Z91.030 ALLERGY TO HONEY BEE VENOM: ICD-10-CM

## 2025-05-20 RX ORDER — EPINEPHRINE 0.3 MG/.3ML
0.3 INJECTION SUBCUTANEOUS PRN
Qty: 2 EACH | Refills: 1 | Status: SHIPPED | OUTPATIENT
Start: 2025-05-20

## 2025-05-21 DIAGNOSIS — Z87.39 HISTORY OF SERONEGATIVE INFLAMMATORY ARTHRITIS: ICD-10-CM

## 2025-05-24 NOTE — TELEPHONE ENCOUNTER
hydroxychloroquine (PLAQUENIL) 200 MG tablet   Start: 02/29/2024   Disp  180  R  3  Take 1 tablet (200 mg) by mouth 2 times daily     Panchito Saenz MD  Rheumatology  Lv  4/10/25  Nv  11/13/25    Creatinine   Date Value Ref Range Status   04/11/2025 0.66 0.51 - 0.95 mg/dL Final   07/10/2021 1.01 0.52 - 1.04 mg/dL Final     Eye exam:  3/12/24  past due.  PLAQUENIL not mentioned in note.    Refill decision: Medication unable to be refilled by RN due to: Other:  Eye exam:  3/12/24  past due.  PLAQUENIL not mentioned in note.        Request from pharmacy:  Requested Prescriptions   Pending Prescriptions Disp Refills    hydroxychloroquine (PLAQUENIL) 200 MG tablet [Pharmacy Med Name: HYDROXYCHLOROQUINE 200 MG TAB] 180 tablet 3     Sig: TAKE 1 TABLET BY MOUTH TWICE A DAY       There is no refill protocol information for this order

## 2025-05-27 RX ORDER — HYDROXYCHLOROQUINE SULFATE 200 MG/1
200 TABLET, FILM COATED ORAL 2 TIMES DAILY
Qty: 180 TABLET | Refills: 3 | Status: SHIPPED | OUTPATIENT
Start: 2025-05-27

## 2025-06-11 ENCOUNTER — TELEPHONE (OUTPATIENT)
Dept: ORTHOPEDICS | Facility: CLINIC | Age: 55
End: 2025-06-11
Payer: COMMERCIAL

## 2025-06-11 NOTE — TELEPHONE ENCOUNTER
Other: Patient calling with unbearable spine spasms. She thinks this may be related to previous spine concerns. Attempted to schedule patient but she would not like to wait until August. Requesting a call back from her team.     Could we send this information to you [facility] or would you prefer to receive a phone call?:   Patient would prefer a phone call   Okay to leave a detailed message?: Yes at Cell number on file:    Telephone Information:   Mobile [phone]

## 2025-06-11 NOTE — TELEPHONE ENCOUNTER
See phone message from pt & see last triage notes when we made RTN appt with  for 7-22-25 after CT chest done at [facility] showed DDD Thoracic.  I called pt C/O new muscle spasms mid back to shoulders started 6-6-25.  HX. Left TSA Feb 2025 sees  & doing PT for shoulder.  Denied Fall or injury or accident or increased activity.  Ice & heat help only slightly for short time.  Tried Ibuprofen 800mg & Tylenol & taking Flexeril 10mg HS for TMJ from Primary provider.    I informed pt I will pt on wait list for sooner appt & made sooner appt with [facility] for eval. With EOS XR in lobby.  Try OTC patches or creams & continue above treatments.  Can check with primary provider about possibly increasing Flexeril.  Call [facility] prn. Pt agreed.    Rosy Hunter RN.

## 2025-06-12 DIAGNOSIS — M54.12 CERVICAL RADICULOPATHY: ICD-10-CM

## 2025-06-12 DIAGNOSIS — M54.2 NECK PAIN ON LEFT SIDE: ICD-10-CM

## 2025-06-12 DIAGNOSIS — S12.9XXS CERVICAL PSEUDOARTHROSIS, SEQUELA: ICD-10-CM

## 2025-06-12 DIAGNOSIS — L08.9 SUPERFICIAL SKIN INFECTION: ICD-10-CM

## 2025-06-12 DIAGNOSIS — M47.12 CERVICAL SPONDYLOSIS WITH MYELOPATHY: ICD-10-CM

## 2025-06-12 DIAGNOSIS — Z98.1 S/P SPINAL FUSION: ICD-10-CM

## 2025-06-12 DIAGNOSIS — Z98.1 S/P CERVICAL SPINAL FUSION: Primary | ICD-10-CM

## 2025-06-20 ENCOUNTER — ANCILLARY PROCEDURE (OUTPATIENT)
Dept: GENERAL RADIOLOGY | Facility: CLINIC | Age: 55
End: 2025-06-20
Attending: ORTHOPAEDIC SURGERY
Payer: COMMERCIAL

## 2025-06-20 DIAGNOSIS — M54.2 NECK PAIN ON LEFT SIDE: ICD-10-CM

## 2025-06-20 DIAGNOSIS — Z98.1 S/P CERVICAL SPINAL FUSION: ICD-10-CM

## 2025-06-20 DIAGNOSIS — L08.9 SUPERFICIAL SKIN INFECTION: ICD-10-CM

## 2025-06-20 DIAGNOSIS — M54.12 CERVICAL RADICULOPATHY: ICD-10-CM

## 2025-06-20 DIAGNOSIS — S12.9XXS CERVICAL PSEUDOARTHROSIS, SEQUELA: ICD-10-CM

## 2025-06-20 DIAGNOSIS — M47.12 CERVICAL SPONDYLOSIS WITH MYELOPATHY: ICD-10-CM

## 2025-06-20 DIAGNOSIS — Z98.1 S/P SPINAL FUSION: ICD-10-CM

## 2025-06-20 PROCEDURE — 77073 BONE LENGTH STUDIES: CPT | Performed by: STUDENT IN AN ORGANIZED HEALTH CARE EDUCATION/TRAINING PROGRAM

## 2025-06-20 PROCEDURE — 72082 X-RAY EXAM ENTIRE SPI 2/3 VW: CPT | Performed by: STUDENT IN AN ORGANIZED HEALTH CARE EDUCATION/TRAINING PROGRAM

## 2025-06-28 ENCOUNTER — ANCILLARY PROCEDURE (OUTPATIENT)
Dept: CT IMAGING | Facility: CLINIC | Age: 55
End: 2025-06-28
Attending: PHYSICIAN ASSISTANT
Payer: COMMERCIAL

## 2025-06-28 DIAGNOSIS — Z98.1 S/P CERVICAL SPINAL FUSION: ICD-10-CM

## 2025-06-28 DIAGNOSIS — Z98.1 S/P SPINAL FUSION: ICD-10-CM

## 2025-06-28 PROCEDURE — 72128 CT CHEST SPINE W/O DYE: CPT | Performed by: RADIOLOGY

## 2025-06-28 PROCEDURE — 72125 CT NECK SPINE W/O DYE: CPT | Performed by: RADIOLOGY

## 2025-07-01 ENCOUNTER — THERAPY VISIT (OUTPATIENT)
Dept: PHYSICAL THERAPY | Facility: CLINIC | Age: 55
End: 2025-07-01
Payer: COMMERCIAL

## 2025-07-01 DIAGNOSIS — M19.012 PRIMARY OSTEOARTHRITIS OF LEFT SHOULDER: Primary | ICD-10-CM

## 2025-07-01 PROCEDURE — 97110 THERAPEUTIC EXERCISES: CPT | Mod: GP | Performed by: PHYSICAL THERAPIST

## 2025-07-08 ENCOUNTER — ANCILLARY PROCEDURE (OUTPATIENT)
Dept: BONE DENSITY | Facility: CLINIC | Age: 55
End: 2025-07-08
Attending: PHYSICIAN ASSISTANT
Payer: COMMERCIAL

## 2025-07-08 DIAGNOSIS — Z98.1 S/P SPINAL FUSION: ICD-10-CM

## 2025-07-08 DIAGNOSIS — M85.80 OTHER SPECIFIED DISORDERS OF BONE DENSITY AND STRUCTURE, UNSPECIFIED SITE: ICD-10-CM

## 2025-07-08 DIAGNOSIS — Z98.1 S/P CERVICAL SPINAL FUSION: ICD-10-CM

## 2025-07-08 DIAGNOSIS — M80.88XA OTHER OSTEOPOROSIS WITH CURRENT PATHOLOGICAL FRACTURE, VERTEBRA(E), INITIAL ENCOUNTER FOR FRACTURE (H): ICD-10-CM

## 2025-07-08 PROCEDURE — 77080 DXA BONE DENSITY AXIAL: CPT | Performed by: INTERNAL MEDICINE

## 2025-07-08 PROCEDURE — 77081 DXA BONE DENSITY APPENDICULR: CPT | Mod: XU | Performed by: INTERNAL MEDICINE

## 2025-07-12 DIAGNOSIS — Z98.1 S/P SPINAL FUSION: ICD-10-CM

## 2025-07-12 DIAGNOSIS — Z98.1 S/P CERVICAL SPINAL FUSION: ICD-10-CM

## 2025-07-14 RX ORDER — TIZANIDINE 2 MG/1
TABLET ORAL
Qty: 56 TABLET | Refills: 0 | Status: SHIPPED | OUTPATIENT
Start: 2025-07-14

## 2025-08-05 ENCOUNTER — VIRTUAL VISIT (OUTPATIENT)
Dept: NEUROSURGERY | Facility: CLINIC | Age: 55
End: 2025-08-05
Payer: COMMERCIAL

## 2025-08-05 DIAGNOSIS — M96.0 PSEUDOARTHROSIS OF THORACIC SPINE AFTER FUSION PROCEDURE: ICD-10-CM

## 2025-08-05 DIAGNOSIS — M54.9 MID BACK PAIN: ICD-10-CM

## 2025-08-05 DIAGNOSIS — Z98.1 S/P SPINAL FUSION: Primary | ICD-10-CM

## 2025-08-05 PROCEDURE — 99207 PR NO CHARGE LOS: CPT | Mod: 95 | Performed by: PHYSICIAN ASSISTANT

## 2025-08-05 RX ORDER — DIAZEPAM 5 MG/1
5 TABLET ORAL
Qty: 2 TABLET | Refills: 0 | Status: SHIPPED | OUTPATIENT
Start: 2025-08-05

## 2025-08-18 PROBLEM — M19.012 DEGENERATIVE ARTHRITIS OF LEFT SHOULDER REGION: Status: RESOLVED | Noted: 2025-02-04 | Resolved: 2025-08-18

## 2025-08-29 ENCOUNTER — MYC REFILL (OUTPATIENT)
Dept: INTERNAL MEDICINE | Facility: CLINIC | Age: 55
End: 2025-08-29
Payer: COMMERCIAL

## 2025-08-29 DIAGNOSIS — G25.81 RESTLESS LEG SYNDROME: ICD-10-CM

## 2025-09-02 ENCOUNTER — MYC REFILL (OUTPATIENT)
Dept: INTERNAL MEDICINE | Facility: CLINIC | Age: 55
End: 2025-09-02
Payer: COMMERCIAL

## 2025-09-02 DIAGNOSIS — G25.81 RESTLESS LEG SYNDROME: ICD-10-CM

## 2025-09-02 RX ORDER — PREGABALIN 150 MG/1
CAPSULE ORAL
Qty: 60 CAPSULE | Refills: 0 | Status: SHIPPED | OUTPATIENT
Start: 2025-09-02

## 2025-09-04 SDOH — HEALTH STABILITY: PHYSICAL HEALTH: ON AVERAGE, HOW MANY DAYS PER WEEK DO YOU ENGAGE IN MODERATE TO STRENUOUS EXERCISE (LIKE A BRISK WALK)?: 3 DAYS

## 2025-09-04 SDOH — HEALTH STABILITY: PHYSICAL HEALTH: ON AVERAGE, HOW MANY MINUTES DO YOU ENGAGE IN EXERCISE AT THIS LEVEL?: 90 MIN

## 2025-09-04 ASSESSMENT — ASTHMA QUESTIONNAIRES
ACT_TOTALSCORE: 11
QUESTION_4 LAST FOUR WEEKS HOW OFTEN HAVE YOU USED YOUR RESCUE INHALER OR NEBULIZER MEDICATION (SUCH AS ALBUTEROL): THREE OR MORE TIMES PER DAY
QUESTION_1 LAST FOUR WEEKS HOW MUCH OF THE TIME DID YOUR ASTHMA KEEP YOU FROM GETTING AS MUCH DONE AT WORK, SCHOOL OR AT HOME: SOME OF THE TIME
QUESTION_2 LAST FOUR WEEKS HOW OFTEN HAVE YOU HAD SHORTNESS OF BREATH: MORE THAN ONCE A DAY
QUESTION_3 LAST FOUR WEEKS HOW OFTEN DID YOUR ASTHMA SYMPTOMS (WHEEZING, COUGHING, SHORTNESS OF BREATH, CHEST TIGHTNESS OR PAIN) WAKE YOU UP AT NIGHT OR EARLIER THAN USUAL IN THE MORNING: TWO OR THREE NIGHTS A WEEK
QUESTION_5 LAST FOUR WEEKS HOW WOULD YOU RATE YOUR ASTHMA CONTROL: WELL CONTROLLED

## 2025-09-09 ENCOUNTER — OFFICE VISIT (OUTPATIENT)
Dept: INTERNAL MEDICINE | Facility: CLINIC | Age: 55
End: 2025-09-09
Payer: COMMERCIAL

## (undated) DEVICE — CATH TRAY FOLEY SURESTEP 16FR W/URNE MTR STLK LATEX A303316A

## (undated) DEVICE — Device

## (undated) DEVICE — SU VICRYL 3-0 X-1 27" UND J458H

## (undated) DEVICE — BRUSH SURGICAL SCRUB W/4% CHLORHEXIDINE GLUCONATE SOL 4458A

## (undated) DEVICE — GLOVE BIOGEL PI MICRO SZ 8.0 48580

## (undated) DEVICE — PREP CHLORAPREP W/ORANGE TINT 10.5ML 260715

## (undated) DEVICE — GLOVE PROTEXIS POWDER FREE SMT 6.5  2D72PT65X

## (undated) DEVICE — SOL NACL 0.9% IRRIG 1000ML BOTTLE 2F7124

## (undated) DEVICE — DRAPE POUCH INSTRUMENT 1018

## (undated) DEVICE — GUIDEWIRE TORNIER AEQUALIS PERFORM +  2.5X220MM DWD017

## (undated) DEVICE — SUCTION MANIFOLD NEPTUNE 2 SYS 4 PORT 0702-020-000

## (undated) DEVICE — SU VICRYL 2-0 CT-2 CR 8X18" J726D

## (undated) DEVICE — SOL HYDROGEN PEROXIDE 3% 4OZ BOTTLE F0010

## (undated) DEVICE — DRAPE CONVERTORS U-DRAPE 60X72" 8476

## (undated) DEVICE — LINEN TOWEL PACK X6 WHITE 5487

## (undated) DEVICE — ESU ELEC NDL 1" COATED/INSULATED E1465

## (undated) DEVICE — NDL SPINAL 18GA 3.5" 405184

## (undated) DEVICE — SU MONOCRYL 3-0 PS-2 18" UND MCP497G

## (undated) DEVICE — ESU ELEC NDL 1" E1552

## (undated) DEVICE — DRAPE U-DRAPE 1015NSD NON-STERILE

## (undated) DEVICE — SUCTION IRR SYSTEM W/O TIP INTERPULSE HANDPIECE 0210-100-000

## (undated) DEVICE — SUCTION SLEEVE NEPTUNE 2 125MM 0703-005-125

## (undated) DEVICE — SPONGE SURGIFOAM 100 1974

## (undated) DEVICE — SPONGE COTTONOID 1/2X1/2" 20-04S

## (undated) DEVICE — SU SILK 2-0 SH CR 5X18" C0125

## (undated) DEVICE — SU VICRYL 2-0 CT-2 CR 18" UND D9910

## (undated) DEVICE — PACK SET-UP STD 9102

## (undated) DEVICE — BLADE KNIFE SURG 15 371115

## (undated) DEVICE — DRAIN JACKSON PRATT RESERVOIR 100ML SU130-1305

## (undated) DEVICE — SPONGE LAP 18X18" X8435

## (undated) DEVICE — IOM SUPPLIES/CASE FEE

## (undated) DEVICE — DRAPE MAYO STAND 23X54 8337

## (undated) DEVICE — TOOL DISSECT MIDAS MR8 14CM MATCH HEAD 3MM MR8-14MH30

## (undated) DEVICE — ESU GROUND PAD ADULT W/CORD E7507

## (undated) DEVICE — TUBE CULTURE AEROBIC/ANAEROBIC W/O SWABS A.C.T.I.1. 12401

## (undated) DEVICE — GLOVE PROTEXIS MICRO 7.0  2D73PM70

## (undated) DEVICE — IMM KIT SHOULDER STABILIZATION 7210573

## (undated) DEVICE — GOWN IMPERVIOUS SPECIALTY XLG/XLONG 32474

## (undated) DEVICE — SOL WATER IRRIG 1000ML BOTTLE 2F7114

## (undated) DEVICE — PACK NEURO MINOR UMMC SNE32MNMU4

## (undated) DEVICE — NDL COUNTER 40CT  31142311

## (undated) DEVICE — DRSG AQUACEL AG HYDROFIBER 3.5X6" 422604

## (undated) DEVICE — BLADE CLIPPER SGL USE 9680

## (undated) DEVICE — PACK SHOULDER RIDGES

## (undated) DEVICE — BLADE KNIFE SURG 10 371110

## (undated) DEVICE — LINEN TOWEL PACK X5 5464

## (undated) DEVICE — DRSG PRIMAPORE 03 1/8X6" 66000318

## (undated) DEVICE — COVER FOOTSWITCH W/CINCH 20X24" 923267

## (undated) DEVICE — TUBING EXTENSION SET MICROBORE 6" LL 2N1194

## (undated) DEVICE — RX SURGIFLO HEMOSTATIC MATRIX W/THROMBIN 8ML 2994

## (undated) DEVICE — SUCTION TIP YANKAUER STR K87

## (undated) DEVICE — SYR 07ML EPIDURAL LOSS OF RESISTANCE PULSATOR 4905

## (undated) DEVICE — DRAPE C-ARM W/STRAPS 42X72" 07-CA104

## (undated) DEVICE — GLOVE BIOGEL PI MICRO INDICATOR UNDERGLOVE SZ 8.5 48985

## (undated) DEVICE — NDL SPINAL 22GA 3.5" QUINCKE 405181

## (undated) DEVICE — DRAIN JACKSON PRATT 07FR ROUND SU130-1320

## (undated) DEVICE — GUIDE PIN STERILE 3X75MM

## (undated) DEVICE — DRAPE IOBAN INCISE 23X17" 6650EZ

## (undated) DEVICE — TUBING SUCTION MEDI-VAC SOFT 3/16"X20' N520A

## (undated) DEVICE — DRAPE SHEET REV FOLD 3/4 9349

## (undated) DEVICE — SU NDL MAYO TROCAR MED 217003

## (undated) DEVICE — BUR ARTHREX COOLCUT OVAL12 FLUTE 5.5MMX13CM AR-8550OBT

## (undated) DEVICE — SOL ISOPROPYL RUBBING ALCOHOL USP 70% 4OZ HDX-20 I0020

## (undated) DEVICE — WIPES FOLEY CARE SURESTEP PROVON DFC100

## (undated) DEVICE — EYE PREP BETADINE 5% SOLUTION 30ML 0065-0411-30

## (undated) DEVICE — LINEN TOWEL PACK X30 5481

## (undated) DEVICE — PROTECTOR ARM ONE-STEP TRENDELENBURG 40418

## (undated) DEVICE — LINEN HALF SHEET 5512

## (undated) DEVICE — DRSG DRAIN 2X2" 7087

## (undated) DEVICE — SYR BULB IRRIG DOVER 60 ML LATEX FREE 67000

## (undated) DEVICE — GLOVE PROTEXIS BLUE W/NEU-THERA 6.5  2D73EB65

## (undated) DEVICE — TRAY PAIN INJECTION 97A 640

## (undated) DEVICE — SYR 30ML LL W/O NDL 302832

## (undated) DEVICE — SU SILK 2-0 TIE 12X30" A305H

## (undated) DEVICE — ESU PENCIL W/SMOKE EVAC NEPTUNE STRYKER 0703-046-000

## (undated) DEVICE — SU VICRYL 0 CT-1 27" J340H

## (undated) DEVICE — DRAPE MICROSCOPE LEICA 54X150" AR8033650

## (undated) DEVICE — NDL 22GA 1.5"

## (undated) DEVICE — SU VICRYL+ 0 27IN CT-2 UND VCP270H

## (undated) DEVICE — ESU CANNULA RF MONOPLOAR CVD 20GA 10X150MM 0406-630-225

## (undated) DEVICE — ESU PENCIL W/HOLSTER E2350H

## (undated) DEVICE — COVER CAMERA IN-LIGHT DISP LT-C02

## (undated) DEVICE — SU FIBERWIRE 2 38" T-8 NDL  AR-7206

## (undated) DEVICE — SU ETHILON 2-0 FS 18" 664H

## (undated) DEVICE — LINEN FULL SHEET 5511

## (undated) DEVICE — SU VICRYL 2-0 CT-1 27" UND J259H

## (undated) DEVICE — IMM COLLAR CERVICAL MED UNIVERSAL 2.5X24" 79-83520

## (undated) DEVICE — BLADE BONE MILL STRK 5.0MM MED 5400-701-000

## (undated) DEVICE — LINEN BACK PACK 5440

## (undated) DEVICE — SURGICEL HEMOSTAT 4X8" 1952

## (undated) DEVICE — SU ETHIBOND 1 CTX CR 8/18" CX30D

## (undated) DEVICE — GLOVE PROTEXIS BLUE W/NEU-THERA 7.5  2D73EB75

## (undated) DEVICE — BAG CLEAR TRASH 1.3M 39X33" P4040C

## (undated) DEVICE — ESU ELEC BLADE 2.75" COATED/INSULATED E1455

## (undated) DEVICE — ESU BIPOLAR SEALER AQUAMANTYS 6MM 23-112-1

## (undated) DEVICE — IMP SCR SET MEDT SOLERA BREAK OFF 5.5MM TI 5540030
Type: IMPLANTABLE DEVICE | Site: SPINE LUMBAR | Status: NON-FUNCTIONAL
Removed: 2022-11-30

## (undated) DEVICE — SU MONOCRYL 2-0 SH 27" UND Y417H

## (undated) DEVICE — SU MONOCRYL 3-0 PS-2 18" UND Y497G

## (undated) DEVICE — DRSG AQUACEL AG HYDROFIBER  3.5X10" 422605

## (undated) DEVICE — SUCTION MINISQUAIR SMOKE EVAC CAPTURE DEVICE SQ20012-01

## (undated) DEVICE — SOL NACL 0.9% IRRIG 3000ML BAG 2B7477

## (undated) DEVICE — MARKER SPHERES PASSIVE MEDT PACK 5 8801075

## (undated) DEVICE — ESU PENCIL SMOKE EVAC W/ROCKER SWITCH 0703-047-000

## (undated) DEVICE — RESTRAINT LIMB HOLDER ANKLE/WRIST FOAM W/QUICK RELEASE 2533

## (undated) DEVICE — MITT SURGICAL PREP HAIR REMOVER LATEX FREE 617142

## (undated) DEVICE — PREP CHLORAPREP 26ML TINTED HI-LITE ORANGE 930815

## (undated) DEVICE — GLOVE ESTEEM POWDER FREE SMT 6.5  2D72PT65

## (undated) DEVICE — DRAIN HEMOVAC RESERVOIR KIT 10FR 1/8" MED 00-2550-002-10

## (undated) DEVICE — SU MONOCRYL 3-0 PS-1 27" Y936H

## (undated) DEVICE — SU ETHILON 3-0 PS-2 18" 1669H

## (undated) DEVICE — STPL SKIN 35W ROTATING HEAD PRW35

## (undated) DEVICE — LINEN DRAPE 54X72" 5467

## (undated) DEVICE — NDL EPIDURAL TUOHY 22GA 3.5" 4911-22

## (undated) DEVICE — ABLATOR ARTHREX APOLLO RF MP90 ASPIRATING 90DEG AR-9811

## (undated) DEVICE — CELL SAVER

## (undated) DEVICE — SYR 03ML LL W/O NDL 309657

## (undated) DEVICE — DRSG TEGADERM 4X4 3/4" 1626W

## (undated) DEVICE — ESU GROUND PAD UNIVERSAL W/O CORD

## (undated) DEVICE — DRAPE STERI TOWEL LG 1010

## (undated) DEVICE — PREP POVIDONE IODINE SWABSTICKS TRIPLE PACK MDS093902A

## (undated) DEVICE — NDL ANGIOCATH 14GA 1.25" 4048

## (undated) DEVICE — GLOVE PROTEXIS POWDER FREE 6.5 ORTHOPEDIC 2D73ET65

## (undated) DEVICE — BUR ARTHREX COOLCUT EXCALIBUR 4.0MMX13CM AR-8400EX

## (undated) DEVICE — BUR MATCHSTICK 3MM ANSPACH L-8NS-G1

## (undated) DEVICE — PREP CHLORAPREP CLEAR 3ML 260400

## (undated) DEVICE — SU MONOCRYL 4-0 PS-2 18" UND Y496G

## (undated) DEVICE — LINEN ORTHO ACL PACK 5447

## (undated) DEVICE — POSITIONER ARMBOARD FOAM 1PAIR LF FP-ARMB1

## (undated) DEVICE — TUBING ARTHROSCOPY PUMP ARTHREX AR-6410

## (undated) DEVICE — SUCTION MANIFOLD DORNOCH ULTRA CART UL-CL500

## (undated) DEVICE — GLOVE BIOGEL PI MICRO INDICATOR UNDERGLOVE SZ 6.5 48965

## (undated) DEVICE — SUTURE VICRYL+ 1 CT-1 CR 8X18" VIO VCP741D

## (undated) DEVICE — DRSG AQUACEL AG 3.5X14"  422607

## (undated) DEVICE — ARTHROSCOPIC CANNULA 5.5X70MM GRAY  9718

## (undated) DEVICE — SU VICRYL 1 CT-1 CR 8X18" J741D

## (undated) DEVICE — SYR 50ML CATH TIP W/O NDL 309620

## (undated) DEVICE — ADH SKIN CLOSURE PREMIERPRO EXOFIN 1.0ML 3470

## (undated) DEVICE — ESU CORD BIPOLAR GREEN 10-4000

## (undated) DEVICE — SU VICRYL 2-0 CT-2 27" UND J269H

## (undated) DEVICE — DRAPE O ARM TUBE 9732722

## (undated) DEVICE — DRAPE U-POUCH 34X29" 1067

## (undated) DEVICE — BONE WAX 2.5GM W31G

## (undated) DEVICE — SU VICRYL 0 CT-1 CR 8X18" J740D

## (undated) DEVICE — IMM KIT SHOULDER TMAX MASK FACE 7210559

## (undated) DEVICE — SU DERMABOND ADVANCED .7ML DNX12

## (undated) DEVICE — BUR ARTHREX COOLCUT SABRE 3.8MMX13CM AR-8380SR

## (undated) DEVICE — DRAIN ROUND W/RESERV KIT JACKSON PRATT 10FR 400ML SU130-402D

## (undated) DEVICE — DRSG ABDOMINAL 07 1/2X8" 7197D

## (undated) DEVICE — OINTMENT ANTIBIOTIC BACITRACIN ZINC .9 G 1171

## (undated) DEVICE — SU CHROMIC 3-0 FS-2 27" 636

## (undated) DEVICE — STRAP KNEE/BODY 31143004

## (undated) DEVICE — PREP CHLORAPREP 26ML TINTED ORANGE  260815

## (undated) DEVICE — SU ETHIBOND 5 V-37 4X30" MB66G

## (undated) DEVICE — ESU CLEANER TIP 31142717

## (undated) DEVICE — GLOVE BIOGEL PI ULTRATOUCH G SZ 6.5 42165

## (undated) DEVICE — DRSG ADAPTIC 3X8" 6113

## (undated) DEVICE — GLOVE PROTEXIS POWDER FREE 8.5 ORTHOPEDIC 2D73ET85

## (undated) DEVICE — DRAPE IOBAN INCISE 13X13" 6640EZ

## (undated) DEVICE — TEST TUBE W/SCREW CAP 17361

## (undated) DEVICE — BONE CLEANING TIP INTERPULSE  0210-010-000

## (undated) DEVICE — APPLICATOR COTTON TIP 6"X2 STERILE LF 6012

## (undated) DEVICE — BLADE SAW SAGITTAL STRK 18X75X0.89MM HD SYS 6 6118-089-075

## (undated) DEVICE — GLOVE PROTEXIS BLUE W/NEU-THERA 8.5  2D73EB85

## (undated) DEVICE — SU ETHILON 3-0 PS-1 18" 1663H

## (undated) DEVICE — BONE CEMENT MIXEVAC III HI VAC KIT  0206-015-000

## (undated) DEVICE — SU MONOCRYL 4-0 PS-2 27" UND Y426H

## (undated) DEVICE — SPONGE KITTNER 30-101

## (undated) RX ORDER — HYDROMORPHONE HYDROCHLORIDE 1 MG/ML
INJECTION, SOLUTION INTRAMUSCULAR; INTRAVENOUS; SUBCUTANEOUS
Status: DISPENSED
Start: 2022-11-30

## (undated) RX ORDER — ONDANSETRON 2 MG/ML
INJECTION INTRAMUSCULAR; INTRAVENOUS
Status: DISPENSED
Start: 2019-08-19

## (undated) RX ORDER — SULFAMETHOXAZOLE/TRIMETHOPRIM 800-160 MG
TABLET ORAL
Status: DISPENSED
Start: 2022-10-27

## (undated) RX ORDER — PROPOFOL 10 MG/ML
INJECTION, EMULSION INTRAVENOUS
Status: DISPENSED
Start: 2025-02-04

## (undated) RX ORDER — ALBUTEROL SULFATE 90 UG/1
AEROSOL, METERED RESPIRATORY (INHALATION)
Status: DISPENSED
Start: 2024-02-12

## (undated) RX ORDER — KETOROLAC TROMETHAMINE 30 MG/ML
INJECTION, SOLUTION INTRAMUSCULAR; INTRAVENOUS
Status: DISPENSED
Start: 2024-02-12

## (undated) RX ORDER — PROPOFOL 10 MG/ML
INJECTION, EMULSION INTRAVENOUS
Status: DISPENSED
Start: 2019-08-19

## (undated) RX ORDER — HYDROCODONE BITARTRATE AND ACETAMINOPHEN 5; 325 MG/1; MG/1
TABLET ORAL
Status: DISPENSED
Start: 2018-10-03

## (undated) RX ORDER — FENTANYL CITRATE 50 UG/ML
INJECTION, SOLUTION INTRAMUSCULAR; INTRAVENOUS
Status: DISPENSED
Start: 2024-02-12

## (undated) RX ORDER — ESMOLOL HYDROCHLORIDE 10 MG/ML
INJECTION INTRAVENOUS
Status: DISPENSED
Start: 2022-11-30

## (undated) RX ORDER — GLYCOPYRROLATE 0.2 MG/ML
INJECTION, SOLUTION INTRAMUSCULAR; INTRAVENOUS
Status: DISPENSED
Start: 2018-10-03

## (undated) RX ORDER — HYDROMORPHONE HYDROCHLORIDE 1 MG/ML
INJECTION, SOLUTION INTRAMUSCULAR; INTRAVENOUS; SUBCUTANEOUS
Status: DISPENSED
Start: 2024-02-12

## (undated) RX ORDER — DEXAMETHASONE SODIUM PHOSPHATE 4 MG/ML
INJECTION, SOLUTION INTRA-ARTICULAR; INTRALESIONAL; INTRAMUSCULAR; INTRAVENOUS; SOFT TISSUE
Status: DISPENSED
Start: 2024-02-12

## (undated) RX ORDER — FENTANYL CITRATE 50 UG/ML
INJECTION, SOLUTION INTRAMUSCULAR; INTRAVENOUS
Status: DISPENSED
Start: 2025-02-04

## (undated) RX ORDER — ACETAMINOPHEN 325 MG/1
TABLET ORAL
Status: DISPENSED
Start: 2024-02-12

## (undated) RX ORDER — BUPIVACAINE HYDROCHLORIDE AND EPINEPHRINE 5; 5 MG/ML; UG/ML
INJECTION, SOLUTION EPIDURAL; INTRACAUDAL; PERINEURAL
Status: DISPENSED
Start: 2019-08-19

## (undated) RX ORDER — LIDOCAINE HYDROCHLORIDE 20 MG/ML
INJECTION, SOLUTION EPIDURAL; INFILTRATION; INTRACAUDAL; PERINEURAL
Status: DISPENSED
Start: 2019-08-19

## (undated) RX ORDER — CALCIUM CHLORIDE 100 MG/ML
INJECTION INTRAVENOUS; INTRAVENTRICULAR
Status: DISPENSED
Start: 2019-08-19

## (undated) RX ORDER — TRANEXAMIC ACID 650 MG/1
TABLET ORAL
Status: DISPENSED
Start: 2025-02-04

## (undated) RX ORDER — FENTANYL CITRATE-0.9 % NACL/PF 10 MCG/ML
PLASTIC BAG, INJECTION (ML) INTRAVENOUS
Status: DISPENSED
Start: 2024-02-12

## (undated) RX ORDER — CEFAZOLIN SODIUM/WATER 2 G/20 ML
SYRINGE (ML) INTRAVENOUS
Status: DISPENSED
Start: 2022-11-30

## (undated) RX ORDER — ALBUTEROL SULFATE 90 UG/1
INHALANT RESPIRATORY (INHALATION)
Status: DISPENSED
Start: 2025-02-04

## (undated) RX ORDER — TRIAMCINOLONE ACETONIDE 40 MG/ML
INJECTION, SUSPENSION INTRA-ARTICULAR; INTRAMUSCULAR
Status: DISPENSED
Start: 2024-09-27

## (undated) RX ORDER — LIDOCAINE HYDROCHLORIDE 10 MG/ML
INJECTION, SOLUTION EPIDURAL; INFILTRATION; INTRACAUDAL; PERINEURAL
Status: DISPENSED
Start: 2024-09-27

## (undated) RX ORDER — GLYCOPYRROLATE 0.2 MG/ML
INJECTION, SOLUTION INTRAMUSCULAR; INTRAVENOUS
Status: DISPENSED
Start: 2024-02-12

## (undated) RX ORDER — FENTANYL CITRATE-0.9 % NACL/PF 10 MCG/ML
PLASTIC BAG, INJECTION (ML) INTRAVENOUS
Status: DISPENSED
Start: 2025-02-04

## (undated) RX ORDER — ALBUTEROL SULFATE 90 UG/1
AEROSOL, METERED RESPIRATORY (INHALATION)
Status: DISPENSED
Start: 2022-11-30

## (undated) RX ORDER — LIDOCAINE HYDROCHLORIDE 10 MG/ML
INJECTION, SOLUTION EPIDURAL; INFILTRATION; INTRACAUDAL; PERINEURAL
Status: DISPENSED
Start: 2019-08-21

## (undated) RX ORDER — ALBUTEROL SULFATE 0.83 MG/ML
SOLUTION RESPIRATORY (INHALATION)
Status: DISPENSED
Start: 2022-11-30

## (undated) RX ORDER — CHLORHEXIDINE GLUCONATE ORAL RINSE 1.2 MG/ML
SOLUTION DENTAL
Status: DISPENSED
Start: 2018-10-03

## (undated) RX ORDER — DEXAMETHASONE SODIUM PHOSPHATE 4 MG/ML
INJECTION, SOLUTION INTRA-ARTICULAR; INTRALESIONAL; INTRAMUSCULAR; INTRAVENOUS; SOFT TISSUE
Status: DISPENSED
Start: 2025-02-04

## (undated) RX ORDER — CEFAZOLIN SODIUM 1 G/3ML
INJECTION, POWDER, FOR SOLUTION INTRAMUSCULAR; INTRAVENOUS
Status: DISPENSED
Start: 2022-11-30

## (undated) RX ORDER — FENTANYL CITRATE 50 UG/ML
INJECTION, SOLUTION INTRAMUSCULAR; INTRAVENOUS
Status: DISPENSED
Start: 2019-12-17

## (undated) RX ORDER — CEFAZOLIN SODIUM/WATER 2 G/20 ML
SYRINGE (ML) INTRAVENOUS
Status: DISPENSED
Start: 2024-02-12

## (undated) RX ORDER — CEPHALEXIN 500 MG/1
CAPSULE ORAL
Status: DISPENSED
Start: 2022-10-27

## (undated) RX ORDER — FENTANYL CITRATE 50 UG/ML
INJECTION, SOLUTION INTRAMUSCULAR; INTRAVENOUS
Status: DISPENSED
Start: 2022-11-30

## (undated) RX ORDER — OXYCODONE HYDROCHLORIDE 5 MG/1
TABLET ORAL
Status: DISPENSED
Start: 2024-02-12

## (undated) RX ORDER — GLYCOPYRROLATE 0.2 MG/ML
INJECTION, SOLUTION INTRAMUSCULAR; INTRAVENOUS
Status: DISPENSED
Start: 2019-08-19

## (undated) RX ORDER — HYDROMORPHONE HYDROCHLORIDE 1 MG/ML
INJECTION, SOLUTION INTRAMUSCULAR; INTRAVENOUS; SUBCUTANEOUS
Status: DISPENSED
Start: 2025-02-04

## (undated) RX ORDER — SIMETHICONE 20 MG/.3ML
EMULSION ORAL
Status: DISPENSED
Start: 2020-02-06

## (undated) RX ORDER — EPHEDRINE SULFATE 50 MG/ML
INJECTION, SOLUTION INTRAMUSCULAR; INTRAVENOUS; SUBCUTANEOUS
Status: DISPENSED
Start: 2022-11-30

## (undated) RX ORDER — PROPOFOL 10 MG/ML
INJECTION, EMULSION INTRAVENOUS
Status: DISPENSED
Start: 2024-02-12

## (undated) RX ORDER — FENTANYL CITRATE 50 UG/ML
INJECTION, SOLUTION INTRAMUSCULAR; INTRAVENOUS
Status: DISPENSED
Start: 2018-10-03

## (undated) RX ORDER — SODIUM CHLORIDE, SODIUM LACTATE, POTASSIUM CHLORIDE, CALCIUM CHLORIDE 600; 310; 30; 20 MG/100ML; MG/100ML; MG/100ML; MG/100ML
INJECTION, SOLUTION INTRAVENOUS
Status: DISPENSED
Start: 2022-11-30

## (undated) RX ORDER — DIAZEPAM 5 MG
TABLET ORAL
Status: DISPENSED
Start: 2020-06-16

## (undated) RX ORDER — CIPROFLOXACIN 500 MG/1
TABLET, FILM COATED ORAL
Status: DISPENSED
Start: 2022-12-13

## (undated) RX ORDER — FENTANYL CITRATE 50 UG/ML
INJECTION, SOLUTION INTRAMUSCULAR; INTRAVENOUS
Status: DISPENSED
Start: 2020-02-06

## (undated) RX ORDER — IPRATROPIUM BROMIDE AND ALBUTEROL SULFATE 2.5; .5 MG/3ML; MG/3ML
SOLUTION RESPIRATORY (INHALATION)
Status: DISPENSED
Start: 2022-11-30

## (undated) RX ORDER — FENTANYL CITRATE 50 UG/ML
INJECTION, SOLUTION INTRAMUSCULAR; INTRAVENOUS
Status: DISPENSED
Start: 2020-11-17

## (undated) RX ORDER — ONDANSETRON 2 MG/ML
INJECTION INTRAMUSCULAR; INTRAVENOUS
Status: DISPENSED
Start: 2024-02-12

## (undated) RX ORDER — PROPOFOL 10 MG/ML
INJECTION, EMULSION INTRAVENOUS
Status: DISPENSED
Start: 2018-10-03

## (undated) RX ORDER — ALBUTEROL SULFATE 0.83 MG/ML
SOLUTION RESPIRATORY (INHALATION)
Status: DISPENSED
Start: 2018-10-03

## (undated) RX ORDER — BUPIVACAINE HYDROCHLORIDE AND EPINEPHRINE 2.5; 5 MG/ML; UG/ML
INJECTION, SOLUTION EPIDURAL; INFILTRATION; INTRACAUDAL; PERINEURAL
Status: DISPENSED
Start: 2022-11-30

## (undated) RX ORDER — EPHEDRINE SULFATE 50 MG/ML
INJECTION, SOLUTION INTRAMUSCULAR; INTRAVENOUS; SUBCUTANEOUS
Status: DISPENSED
Start: 2025-02-04

## (undated) RX ORDER — FENTANYL CITRATE 50 UG/ML
INJECTION, SOLUTION INTRAMUSCULAR; INTRAVENOUS
Status: DISPENSED
Start: 2019-08-19

## (undated) RX ORDER — LIDOCAINE HYDROCHLORIDE 10 MG/ML
INJECTION, SOLUTION EPIDURAL; INFILTRATION; INTRACAUDAL; PERINEURAL
Status: DISPENSED
Start: 2019-08-22

## (undated) RX ORDER — VANCOMYCIN HYDROCHLORIDE 1 G/20ML
INJECTION, POWDER, LYOPHILIZED, FOR SOLUTION INTRAVENOUS
Status: DISPENSED
Start: 2025-02-04

## (undated) RX ORDER — SODIUM CHLORIDE, SODIUM LACTATE, POTASSIUM CHLORIDE, CALCIUM CHLORIDE 600; 310; 30; 20 MG/100ML; MG/100ML; MG/100ML; MG/100ML
INJECTION, SOLUTION INTRAVENOUS
Status: DISPENSED
Start: 2024-02-12

## (undated) RX ORDER — BACITRACIN 50000 [IU]/1
INJECTION, POWDER, FOR SOLUTION INTRAMUSCULAR
Status: DISPENSED
Start: 2019-08-19

## (undated) RX ORDER — HYDROMORPHONE HYDROCHLORIDE 1 MG/ML
INJECTION, SOLUTION INTRAMUSCULAR; INTRAVENOUS; SUBCUTANEOUS
Status: DISPENSED
Start: 2019-08-19

## (undated) RX ORDER — VANCOMYCIN HYDROCHLORIDE 1 G/20ML
INJECTION, POWDER, LYOPHILIZED, FOR SOLUTION INTRAVENOUS
Status: DISPENSED
Start: 2022-11-30

## (undated) RX ORDER — EPHEDRINE SULFATE 50 MG/ML
INJECTION, SOLUTION INTRAMUSCULAR; INTRAVENOUS; SUBCUTANEOUS
Status: DISPENSED
Start: 2018-10-03

## (undated) RX ORDER — PROPOFOL 10 MG/ML
INJECTION, EMULSION INTRAVENOUS
Status: DISPENSED
Start: 2020-02-06

## (undated) RX ORDER — VANCOMYCIN HYDROCHLORIDE 1 G/20ML
INJECTION, POWDER, LYOPHILIZED, FOR SOLUTION INTRAVENOUS
Status: DISPENSED
Start: 2024-02-12

## (undated) RX ORDER — LIDOCAINE HYDROCHLORIDE 20 MG/ML
INJECTION, SOLUTION EPIDURAL; INFILTRATION; INTRACAUDAL; PERINEURAL
Status: DISPENSED
Start: 2018-10-03

## (undated) RX ORDER — CEFAZOLIN SODIUM/WATER 2 G/20 ML
SYRINGE (ML) INTRAVENOUS
Status: DISPENSED
Start: 2025-02-04

## (undated) RX ORDER — TRIAMCINOLONE ACETONIDE 40 MG/ML
INJECTION, SUSPENSION INTRA-ARTICULAR; INTRAMUSCULAR
Status: DISPENSED
Start: 2018-08-14

## (undated) RX ORDER — LIDOCAINE HYDROCHLORIDE AND EPINEPHRINE 10; 10 MG/ML; UG/ML
INJECTION, SOLUTION INFILTRATION; PERINEURAL
Status: DISPENSED
Start: 2021-12-07

## (undated) RX ORDER — HYDROMORPHONE HCL/0.9% NACL/PF 0.2MG/0.2
SYRINGE (ML) INTRAVENOUS
Status: DISPENSED
Start: 2018-10-03

## (undated) RX ORDER — DEXAMETHASONE SODIUM PHOSPHATE 4 MG/ML
INJECTION, SOLUTION INTRA-ARTICULAR; INTRALESIONAL; INTRAMUSCULAR; INTRAVENOUS; SOFT TISSUE
Status: DISPENSED
Start: 2019-08-19